# Patient Record
Sex: MALE | Race: WHITE | NOT HISPANIC OR LATINO | Employment: OTHER | URBAN - METROPOLITAN AREA
[De-identification: names, ages, dates, MRNs, and addresses within clinical notes are randomized per-mention and may not be internally consistent; named-entity substitution may affect disease eponyms.]

---

## 2020-11-05 ENCOUNTER — TRANSCRIBE ORDERS (OUTPATIENT)
Dept: ADMINISTRATIVE | Facility: HOSPITAL | Age: 65
End: 2020-11-05

## 2020-11-05 ENCOUNTER — HOSPITAL ENCOUNTER (EMERGENCY)
Facility: HOSPITAL | Age: 65
Discharge: HOME | End: 2020-11-05
Attending: EMERGENCY MEDICINE
Payer: COMMERCIAL

## 2020-11-05 ENCOUNTER — APPOINTMENT (EMERGENCY)
Dept: RADIOLOGY | Facility: HOSPITAL | Age: 65
End: 2020-11-05
Payer: COMMERCIAL

## 2020-11-05 VITALS
BODY MASS INDEX: 34.02 KG/M2 | RESPIRATION RATE: 18 BRPM | HEIGHT: 71 IN | WEIGHT: 243 LBS | HEART RATE: 102 BPM | OXYGEN SATURATION: 98 % | TEMPERATURE: 98 F | SYSTOLIC BLOOD PRESSURE: 147 MMHG | DIASTOLIC BLOOD PRESSURE: 66 MMHG

## 2020-11-05 DIAGNOSIS — R26.89 BALANCE DISORDER: Primary | ICD-10-CM

## 2020-11-05 DIAGNOSIS — S91.302A WOUND OF LEFT FOOT: Primary | ICD-10-CM

## 2020-11-05 DIAGNOSIS — Z86.39 HISTORY OF DIABETES MELLITUS: ICD-10-CM

## 2020-11-05 PROCEDURE — 99283 EMERGENCY DEPT VISIT LOW MDM: CPT

## 2020-11-05 PROCEDURE — 73630 X-RAY EXAM OF FOOT: CPT

## 2020-11-05 PROCEDURE — 99284 EMERGENCY DEPT VISIT MOD MDM: CPT | Performed by: PHYSICIAN ASSISTANT

## 2020-11-05 RX ORDER — CLINDAMYCIN HYDROCHLORIDE 300 MG/1
300 CAPSULE ORAL 3 TIMES DAILY
Qty: 42 CAPSULE | Refills: 0 | Status: SHIPPED | OUTPATIENT
Start: 2020-11-05 | End: 2020-11-19

## 2020-11-05 RX ORDER — CLINDAMYCIN HYDROCHLORIDE 150 MG/1
450 CAPSULE ORAL ONCE
Status: COMPLETED | OUTPATIENT
Start: 2020-11-05 | End: 2020-11-05

## 2020-11-05 RX ADMIN — CLINDAMYCIN HYDROCHLORIDE 450 MG: 150 CAPSULE ORAL at 20:54

## 2020-11-09 ENCOUNTER — OFFICE VISIT (OUTPATIENT)
Dept: PODIATRY | Facility: CLINIC | Age: 65
End: 2020-11-09
Payer: COMMERCIAL

## 2020-11-09 VITALS — HEIGHT: 71 IN | TEMPERATURE: 97.3 F | WEIGHT: 233 LBS | BODY MASS INDEX: 32.62 KG/M2

## 2020-11-09 DIAGNOSIS — M20.22 HALLUX RIGIDUS OF BOTH FEET: ICD-10-CM

## 2020-11-09 DIAGNOSIS — E11.40 TYPE 2 DIABETES MELLITUS WITH DIABETIC NEUROPATHY, WITHOUT LONG-TERM CURRENT USE OF INSULIN (HCC): ICD-10-CM

## 2020-11-09 DIAGNOSIS — M20.21 HALLUX RIGIDUS OF BOTH FEET: ICD-10-CM

## 2020-11-09 DIAGNOSIS — L97.529: Primary | ICD-10-CM

## 2020-11-09 PROCEDURE — 11042 DBRDMT SUBQ TIS 1ST 20SQCM/<: CPT | Performed by: PODIATRIST

## 2020-11-09 PROCEDURE — 99203 OFFICE O/P NEW LOW 30 MIN: CPT | Performed by: PODIATRIST

## 2020-11-09 RX ORDER — GLIMEPIRIDE 4 MG/1
TABLET ORAL 2 TIMES DAILY
COMMUNITY
Start: 2020-10-22 | End: 2022-05-19

## 2020-11-09 RX ORDER — LOSARTAN POTASSIUM 50 MG/1
TABLET ORAL
COMMUNITY
Start: 2020-10-23

## 2020-11-09 RX ORDER — EMPAGLIFLOZIN AND LINAGLIPTIN 25; 5 MG/1; MG/1
TABLET, FILM COATED ORAL
Status: ON HOLD | COMMUNITY
Start: 2020-10-22 | End: 2022-05-17

## 2020-11-09 RX ORDER — DILTIAZEM HYDROCHLORIDE 180 MG/1
CAPSULE, COATED, EXTENDED RELEASE ORAL
COMMUNITY
Start: 2020-10-22

## 2020-11-09 RX ORDER — METHYLPREDNISOLONE 4 MG/1
TABLET ORAL
COMMUNITY
Start: 2020-10-11 | End: 2020-12-27 | Stop reason: HOSPADM

## 2020-11-09 RX ORDER — TRIAMTERENE AND HYDROCHLOROTHIAZIDE 37.5; 25 MG/1; MG/1
CAPSULE ORAL
COMMUNITY
Start: 2020-10-22 | End: 2020-12-27 | Stop reason: HOSPADM

## 2020-11-12 ENCOUNTER — OFFICE VISIT (OUTPATIENT)
Dept: WOUND CARE | Facility: HOSPITAL | Age: 65
End: 2020-11-12
Payer: COMMERCIAL

## 2020-11-12 VITALS
HEIGHT: 71 IN | TEMPERATURE: 97.7 F | RESPIRATION RATE: 18 BRPM | HEART RATE: 72 BPM | DIASTOLIC BLOOD PRESSURE: 78 MMHG | SYSTOLIC BLOOD PRESSURE: 124 MMHG | BODY MASS INDEX: 32.2 KG/M2 | WEIGHT: 230 LBS

## 2020-11-12 DIAGNOSIS — L97.529: Primary | ICD-10-CM

## 2020-11-12 DIAGNOSIS — E11.40 TYPE 2 DIABETES MELLITUS WITH DIABETIC NEUROPATHY, UNSPECIFIED WHETHER LONG TERM INSULIN USE (HCC): ICD-10-CM

## 2020-11-12 PROCEDURE — 99213 OFFICE O/P EST LOW 20 MIN: CPT | Performed by: PODIATRIST

## 2020-11-12 RX ORDER — LIDOCAINE HYDROCHLORIDE 40 MG/ML
5 SOLUTION TOPICAL ONCE
Status: COMPLETED | OUTPATIENT
Start: 2020-11-12 | End: 2020-11-12

## 2020-11-12 RX ADMIN — LIDOCAINE HYDROCHLORIDE 5 ML: 40 SOLUTION TOPICAL at 09:58

## 2020-11-19 ENCOUNTER — OFFICE VISIT (OUTPATIENT)
Dept: WOUND CARE | Facility: HOSPITAL | Age: 65
End: 2020-11-19
Payer: COMMERCIAL

## 2020-11-19 VITALS
RESPIRATION RATE: 16 BRPM | HEART RATE: 64 BPM | TEMPERATURE: 96.3 F | DIASTOLIC BLOOD PRESSURE: 64 MMHG | SYSTOLIC BLOOD PRESSURE: 132 MMHG

## 2020-11-19 DIAGNOSIS — E11.40 TYPE 2 DIABETES MELLITUS WITH DIABETIC NEUROPATHY, UNSPECIFIED WHETHER LONG TERM INSULIN USE (HCC): ICD-10-CM

## 2020-11-19 DIAGNOSIS — L97.529: Primary | ICD-10-CM

## 2020-11-19 PROCEDURE — 11042 DBRDMT SUBQ TIS 1ST 20SQCM/<: CPT | Performed by: PODIATRIST

## 2020-11-19 RX ORDER — LIDOCAINE HYDROCHLORIDE 40 MG/ML
5 SOLUTION TOPICAL ONCE
Status: COMPLETED | OUTPATIENT
Start: 2020-11-19 | End: 2020-11-19

## 2020-11-19 RX ADMIN — LIDOCAINE HYDROCHLORIDE 5 ML: 40 SOLUTION TOPICAL at 09:16

## 2020-11-21 ENCOUNTER — HOSPITAL ENCOUNTER (OUTPATIENT)
Dept: MRI IMAGING | Facility: HOSPITAL | Age: 65
Discharge: HOME | End: 2020-11-21
Payer: COMMERCIAL

## 2020-11-21 DIAGNOSIS — R26.89 BALANCE DISORDER: ICD-10-CM

## 2020-11-21 PROCEDURE — G1004 CDSM NDSC: HCPCS

## 2020-11-21 PROCEDURE — 70551 MRI BRAIN STEM W/O DYE: CPT

## 2020-12-03 ENCOUNTER — OFFICE VISIT (OUTPATIENT)
Dept: WOUND CARE | Facility: HOSPITAL | Age: 65
End: 2020-12-03
Payer: COMMERCIAL

## 2020-12-03 VITALS
TEMPERATURE: 97.9 F | HEART RATE: 96 BPM | SYSTOLIC BLOOD PRESSURE: 126 MMHG | RESPIRATION RATE: 18 BRPM | DIASTOLIC BLOOD PRESSURE: 58 MMHG

## 2020-12-03 DIAGNOSIS — L97.529: Primary | ICD-10-CM

## 2020-12-03 DIAGNOSIS — E11.40 TYPE 2 DIABETES MELLITUS WITH DIABETIC NEUROPATHY, UNSPECIFIED WHETHER LONG TERM INSULIN USE (HCC): ICD-10-CM

## 2020-12-03 PROCEDURE — 99213 OFFICE O/P EST LOW 20 MIN: CPT | Performed by: PODIATRIST

## 2020-12-03 RX ORDER — LIDOCAINE HYDROCHLORIDE 40 MG/ML
5 SOLUTION TOPICAL ONCE
Status: COMPLETED | OUTPATIENT
Start: 2020-12-03 | End: 2020-12-03

## 2020-12-03 RX ADMIN — LIDOCAINE HYDROCHLORIDE 5 ML: 40 SOLUTION TOPICAL at 09:14

## 2020-12-24 ENCOUNTER — OFFICE VISIT (OUTPATIENT)
Dept: WOUND CARE | Facility: HOSPITAL | Age: 65
End: 2020-12-24
Payer: COMMERCIAL

## 2020-12-24 VITALS
RESPIRATION RATE: 18 BRPM | TEMPERATURE: 98.1 F | SYSTOLIC BLOOD PRESSURE: 120 MMHG | HEART RATE: 88 BPM | DIASTOLIC BLOOD PRESSURE: 58 MMHG

## 2020-12-24 DIAGNOSIS — L97.529: Primary | ICD-10-CM

## 2020-12-24 DIAGNOSIS — E11.40 TYPE 2 DIABETES MELLITUS WITH DIABETIC NEUROPATHY, UNSPECIFIED WHETHER LONG TERM INSULIN USE (HCC): ICD-10-CM

## 2020-12-24 PROCEDURE — 99213 OFFICE O/P EST LOW 20 MIN: CPT | Performed by: PODIATRIST

## 2020-12-24 PROCEDURE — 99212 OFFICE O/P EST SF 10 MIN: CPT | Performed by: PODIATRIST

## 2020-12-25 ENCOUNTER — HOSPITAL ENCOUNTER (EMERGENCY)
Facility: HOSPITAL | Age: 65
End: 2020-12-25
Attending: EMERGENCY MEDICINE
Payer: COMMERCIAL

## 2020-12-25 ENCOUNTER — APPOINTMENT (EMERGENCY)
Dept: RADIOLOGY | Facility: HOSPITAL | Age: 65
End: 2020-12-25
Payer: COMMERCIAL

## 2020-12-25 ENCOUNTER — HOSPITAL ENCOUNTER (INPATIENT)
Facility: HOSPITAL | Age: 65
LOS: 2 days | Discharge: HOME | DRG: 177 | End: 2020-12-27
Attending: STUDENT IN AN ORGANIZED HEALTH CARE EDUCATION/TRAINING PROGRAM | Admitting: STUDENT IN AN ORGANIZED HEALTH CARE EDUCATION/TRAINING PROGRAM
Payer: MEDICARE

## 2020-12-25 ENCOUNTER — APPOINTMENT (EMERGENCY)
Dept: CT IMAGING | Facility: HOSPITAL | Age: 65
End: 2020-12-25
Payer: COMMERCIAL

## 2020-12-25 VITALS
OXYGEN SATURATION: 96 % | RESPIRATION RATE: 18 BRPM | SYSTOLIC BLOOD PRESSURE: 115 MMHG | TEMPERATURE: 98.3 F | DIASTOLIC BLOOD PRESSURE: 58 MMHG | HEART RATE: 100 BPM

## 2020-12-25 DIAGNOSIS — R06.00 DYSPNEA: ICD-10-CM

## 2020-12-25 DIAGNOSIS — D50.8 IRON DEFICIENCY ANEMIA SECONDARY TO INADEQUATE DIETARY IRON INTAKE: ICD-10-CM

## 2020-12-25 DIAGNOSIS — N17.9 AKI (ACUTE KIDNEY INJURY) (HCC): ICD-10-CM

## 2020-12-25 DIAGNOSIS — F10.10 ALCOHOL ABUSE: Primary | ICD-10-CM

## 2020-12-25 DIAGNOSIS — U07.1 PNEUMONIA DUE TO COVID-19 VIRUS: Primary | ICD-10-CM

## 2020-12-25 DIAGNOSIS — R79.89 ELEVATED LACTIC ACID LEVEL: ICD-10-CM

## 2020-12-25 DIAGNOSIS — D64.9 ANEMIA: ICD-10-CM

## 2020-12-25 DIAGNOSIS — R16.2 HEPATOSPLENOMEGALY: ICD-10-CM

## 2020-12-25 DIAGNOSIS — U07.1 PNEUMONIA DUE TO COVID-19 VIRUS: ICD-10-CM

## 2020-12-25 DIAGNOSIS — J12.82 PNEUMONIA DUE TO COVID-19 VIRUS: ICD-10-CM

## 2020-12-25 DIAGNOSIS — K20.90 ESOPHAGITIS: ICD-10-CM

## 2020-12-25 DIAGNOSIS — K70.30 CIRRHOSIS WITH ALCOHOLISM (HCC): ICD-10-CM

## 2020-12-25 DIAGNOSIS — R05.9 COUGH: ICD-10-CM

## 2020-12-25 DIAGNOSIS — J12.82 PNEUMONIA DUE TO COVID-19 VIRUS: Primary | ICD-10-CM

## 2020-12-25 DIAGNOSIS — K80.20 GALLSTONE: ICD-10-CM

## 2020-12-25 DIAGNOSIS — N40.0 ENLARGED PROSTATE: ICD-10-CM

## 2020-12-25 PROBLEM — I10 BENIGN ESSENTIAL HTN: Status: ACTIVE | Noted: 2020-12-25

## 2020-12-25 PROBLEM — G47.33 OSA (OBSTRUCTIVE SLEEP APNEA): Status: ACTIVE | Noted: 2020-12-25

## 2020-12-25 PROBLEM — D69.6 THROMBOCYTOPENIA (HCC): Status: ACTIVE | Noted: 2020-12-25

## 2020-12-25 PROBLEM — E11.9 TYPE 2 DIABETES MELLITUS (HCC): Status: ACTIVE | Noted: 2020-12-25

## 2020-12-25 LAB
ABO GROUP BLD: NORMAL
ABO GROUP BLD: NORMAL
ALBUMIN SERPL BCP-MCNC: 2.8 G/DL (ref 3.5–5)
ALP SERPL-CCNC: 203 U/L (ref 46–116)
ALT SERPL W P-5'-P-CCNC: 63 U/L (ref 12–78)
ANION GAP SERPL CALCULATED.3IONS-SCNC: 15 MMOL/L (ref 4–13)
APTT PPP: 36 SECONDS (ref 23–37)
AST SERPL W P-5'-P-CCNC: 94 U/L (ref 5–45)
ATRIAL RATE: 100 BPM
BASE EX.OXY STD BLDV CALC-SCNC: 87.5 % (ref 60–80)
BASE EXCESS BLDV CALC-SCNC: -7.1 MMOL/L
BASOPHILS # BLD AUTO: 0.05 THOUSANDS/ΜL (ref 0–0.1)
BASOPHILS NFR BLD AUTO: 1 % (ref 0–1)
BETA-HYDROXYBUTYRATE: 0.1 MMOL/L
BILIRUB SERPL-MCNC: 1.29 MG/DL (ref 0.2–1)
BLD GP AB SCN SERPL QL: NEGATIVE
BUN SERPL-MCNC: 25 MG/DL (ref 5–25)
CALCIUM ALBUM COR SERPL-MCNC: 9.7 MG/DL (ref 8.3–10.1)
CALCIUM SERPL-MCNC: 8.7 MG/DL (ref 8.3–10.1)
CHLORIDE SERPL-SCNC: 98 MMOL/L (ref 100–108)
CO2 SERPL-SCNC: 19 MMOL/L (ref 21–32)
CREAT SERPL-MCNC: 1.45 MG/DL (ref 0.6–1.3)
D DIMER PPP FEU-MCNC: 0.85 UG/ML FEU
EOSINOPHIL # BLD AUTO: 0.08 THOUSAND/ΜL (ref 0–0.61)
EOSINOPHIL NFR BLD AUTO: 2 % (ref 0–6)
ERYTHROCYTE [DISTWIDTH] IN BLOOD BY AUTOMATED COUNT: 19.9 % (ref 11.6–15.1)
ETHANOL SERPL-MCNC: 158 MG/DL (ref 0–3)
EXT FECAL OCCULT BLOOD SCREEN: NEGATIVE
EXT. CONTROL: NORMAL
FERRITIN SERPL-MCNC: 15 NG/ML (ref 8–388)
FLUAV RNA RESP QL NAA+PROBE: NEGATIVE
FLUBV RNA RESP QL NAA+PROBE: NEGATIVE
GFR SERPL CREATININE-BSD FRML MDRD: 49 ML/MIN/1.73SQ M
GLUCOSE SERPL-MCNC: 124 MG/DL (ref 65–140)
GLUCOSE SERPL-MCNC: 152 MG/DL (ref 65–140)
GLUCOSE SERPL-MCNC: 156 MG/DL (ref 65–140)
GLUCOSE SERPL-MCNC: 160 MG/DL (ref 65–140)
GLUCOSE SERPL-MCNC: 193 MG/DL (ref 65–140)
HCO3 BLDV-SCNC: 16 MMOL/L (ref 24–30)
HCT VFR BLD AUTO: 28.2 % (ref 36.5–49.3)
HGB BLD-MCNC: 7.9 G/DL (ref 12–17)
IMM GRANULOCYTES # BLD AUTO: 0.03 THOUSAND/UL (ref 0–0.2)
IMM GRANULOCYTES NFR BLD AUTO: 1 % (ref 0–2)
INR PPP: 1.25 (ref 0.84–1.19)
IRON SATN MFR SERPL: 6 %
IRON SERPL-MCNC: 22 UG/DL (ref 65–175)
LACTATE SERPL-SCNC: 3 MMOL/L (ref 0.5–2)
LACTATE SERPL-SCNC: 3.5 MMOL/L (ref 0.5–2)
LYMPHOCYTES # BLD AUTO: 0.57 THOUSANDS/ΜL (ref 0.6–4.47)
LYMPHOCYTES NFR BLD AUTO: 12 % (ref 14–44)
MCH RBC QN AUTO: 19.9 PG (ref 26.8–34.3)
MCHC RBC AUTO-ENTMCNC: 28 G/DL (ref 31.4–37.4)
MCV RBC AUTO: 71 FL (ref 82–98)
MONOCYTES # BLD AUTO: 0.68 THOUSAND/ΜL (ref 0.17–1.22)
MONOCYTES NFR BLD AUTO: 15 % (ref 4–12)
NEUTROPHILS # BLD AUTO: 3.23 THOUSANDS/ΜL (ref 1.85–7.62)
NEUTS SEG NFR BLD AUTO: 69 % (ref 43–75)
NRBC BLD AUTO-RTO: 1 /100 WBCS
NT-PROBNP SERPL-MCNC: 79 PG/ML
O2 CT BLDV-SCNC: 10.9 ML/DL
P AXIS: 48 DEGREES
PCO2 BLDV: 24.3 MM HG (ref 42–50)
PH BLDV: 7.44 [PH] (ref 7.3–7.4)
PLATELET # BLD AUTO: 109 THOUSANDS/UL (ref 149–390)
PO2 BLDV: 62.8 MM HG (ref 35–45)
POTASSIUM SERPL-SCNC: 3.6 MMOL/L (ref 3.5–5.3)
PR INTERVAL: 162 MS
PROT SERPL-MCNC: 6.9 G/DL (ref 6.4–8.2)
PROTHROMBIN TIME: 15.8 SECONDS (ref 11.6–14.5)
QRS AXIS: 17 DEGREES
QRSD INTERVAL: 84 MS
QT INTERVAL: 312 MS
QTC INTERVAL: 402 MS
RBC # BLD AUTO: 3.97 MILLION/UL (ref 3.88–5.62)
RH BLD: POSITIVE
RH BLD: POSITIVE
RSV RNA RESP QL NAA+PROBE: NEGATIVE
SARS-COV-2 RNA RESP QL NAA+PROBE: POSITIVE
SODIUM SERPL-SCNC: 132 MMOL/L (ref 136–145)
SPECIMEN EXPIRATION DATE: NORMAL
T WAVE AXIS: 59 DEGREES
TIBC SERPL-MCNC: 391 UG/DL (ref 250–450)
TROPONIN I SERPL-MCNC: 0.02 NG/ML
VENTRICULAR RATE: 100 BPM
WBC # BLD AUTO: 4.64 THOUSAND/UL (ref 4.31–10.16)

## 2020-12-25 PROCEDURE — 99285 EMERGENCY DEPT VISIT HI MDM: CPT

## 2020-12-25 PROCEDURE — 85379 FIBRIN DEGRADATION QUANT: CPT | Performed by: EMERGENCY MEDICINE

## 2020-12-25 PROCEDURE — 36415 COLL VENOUS BLD VENIPUNCTURE: CPT | Performed by: EMERGENCY MEDICINE

## 2020-12-25 PROCEDURE — 82948 REAGENT STRIP/BLOOD GLUCOSE: CPT

## 2020-12-25 PROCEDURE — C9113 INJ PANTOPRAZOLE SODIUM, VIA: HCPCS | Performed by: EMERGENCY MEDICINE

## 2020-12-25 PROCEDURE — 94660 CPAP INITIATION&MGMT: CPT

## 2020-12-25 PROCEDURE — 80320 DRUG SCREEN QUANTALCOHOLS: CPT | Performed by: EMERGENCY MEDICINE

## 2020-12-25 PROCEDURE — 0241U HB NFCT DS VIR RESP RNA 4 TRGT: CPT | Performed by: EMERGENCY MEDICINE

## 2020-12-25 PROCEDURE — 82010 KETONE BODYS QUAN: CPT | Performed by: EMERGENCY MEDICINE

## 2020-12-25 PROCEDURE — 85730 THROMBOPLASTIN TIME PARTIAL: CPT | Performed by: EMERGENCY MEDICINE

## 2020-12-25 PROCEDURE — 96375 TX/PRO/DX INJ NEW DRUG ADDON: CPT

## 2020-12-25 PROCEDURE — 83550 IRON BINDING TEST: CPT | Performed by: EMERGENCY MEDICINE

## 2020-12-25 PROCEDURE — 96365 THER/PROPH/DIAG IV INF INIT: CPT

## 2020-12-25 PROCEDURE — 84484 ASSAY OF TROPONIN QUANT: CPT | Performed by: EMERGENCY MEDICINE

## 2020-12-25 PROCEDURE — 85610 PROTHROMBIN TIME: CPT | Performed by: EMERGENCY MEDICINE

## 2020-12-25 PROCEDURE — 96361 HYDRATE IV INFUSION ADD-ON: CPT

## 2020-12-25 PROCEDURE — 99223 1ST HOSP IP/OBS HIGH 75: CPT | Performed by: STUDENT IN AN ORGANIZED HEALTH CARE EDUCATION/TRAINING PROGRAM

## 2020-12-25 PROCEDURE — 71045 X-RAY EXAM CHEST 1 VIEW: CPT

## 2020-12-25 PROCEDURE — 94760 N-INVAS EAR/PLS OXIMETRY 1: CPT

## 2020-12-25 PROCEDURE — 80053 COMPREHEN METABOLIC PANEL: CPT | Performed by: EMERGENCY MEDICINE

## 2020-12-25 PROCEDURE — 82728 ASSAY OF FERRITIN: CPT | Performed by: EMERGENCY MEDICINE

## 2020-12-25 PROCEDURE — 74177 CT ABD & PELVIS W/CONTRAST: CPT

## 2020-12-25 PROCEDURE — 85025 COMPLETE CBC W/AUTO DIFF WBC: CPT | Performed by: EMERGENCY MEDICINE

## 2020-12-25 PROCEDURE — 83605 ASSAY OF LACTIC ACID: CPT | Performed by: EMERGENCY MEDICINE

## 2020-12-25 PROCEDURE — 87040 BLOOD CULTURE FOR BACTERIA: CPT | Performed by: EMERGENCY MEDICINE

## 2020-12-25 PROCEDURE — 83540 ASSAY OF IRON: CPT | Performed by: EMERGENCY MEDICINE

## 2020-12-25 PROCEDURE — 71275 CT ANGIOGRAPHY CHEST: CPT

## 2020-12-25 PROCEDURE — 99285 EMERGENCY DEPT VISIT HI MDM: CPT | Performed by: EMERGENCY MEDICINE

## 2020-12-25 PROCEDURE — 86900 BLOOD TYPING SEROLOGIC ABO: CPT | Performed by: EMERGENCY MEDICINE

## 2020-12-25 PROCEDURE — 93005 ELECTROCARDIOGRAM TRACING: CPT

## 2020-12-25 PROCEDURE — 83880 ASSAY OF NATRIURETIC PEPTIDE: CPT | Performed by: EMERGENCY MEDICINE

## 2020-12-25 PROCEDURE — 82805 BLOOD GASES W/O2 SATURATION: CPT | Performed by: EMERGENCY MEDICINE

## 2020-12-25 PROCEDURE — 93010 ELECTROCARDIOGRAM REPORT: CPT | Performed by: INTERNAL MEDICINE

## 2020-12-25 PROCEDURE — 86901 BLOOD TYPING SEROLOGIC RH(D): CPT | Performed by: EMERGENCY MEDICINE

## 2020-12-25 PROCEDURE — 86850 RBC ANTIBODY SCREEN: CPT | Performed by: EMERGENCY MEDICINE

## 2020-12-25 RX ORDER — MELATONIN
2000 DAILY
Status: DISCONTINUED | OUTPATIENT
Start: 2020-12-25 | End: 2020-12-27 | Stop reason: HOSPADM

## 2020-12-25 RX ORDER — DILTIAZEM HYDROCHLORIDE 180 MG/1
180 CAPSULE, COATED, EXTENDED RELEASE ORAL DAILY
Status: DISCONTINUED | OUTPATIENT
Start: 2020-12-25 | End: 2020-12-27 | Stop reason: HOSPADM

## 2020-12-25 RX ORDER — PANTOPRAZOLE SODIUM 40 MG/1
40 INJECTION, POWDER, FOR SOLUTION INTRAVENOUS ONCE
Status: COMPLETED | OUTPATIENT
Start: 2020-12-25 | End: 2020-12-25

## 2020-12-25 RX ORDER — HEPARIN SODIUM 5000 [USP'U]/ML
5000 INJECTION, SOLUTION INTRAVENOUS; SUBCUTANEOUS EVERY 8 HOURS SCHEDULED
Status: DISCONTINUED | OUTPATIENT
Start: 2020-12-25 | End: 2020-12-27 | Stop reason: HOSPADM

## 2020-12-25 RX ORDER — BENZONATATE 100 MG/1
200 CAPSULE ORAL ONCE
Status: COMPLETED | OUTPATIENT
Start: 2020-12-25 | End: 2020-12-25

## 2020-12-25 RX ORDER — MULTIVITAMIN/IRON/FOLIC ACID 18MG-0.4MG
1 TABLET ORAL DAILY
Status: DISCONTINUED | OUTPATIENT
Start: 2021-01-01 | End: 2020-12-27 | Stop reason: HOSPADM

## 2020-12-25 RX ORDER — SODIUM CHLORIDE 9 MG/ML
125 INJECTION, SOLUTION INTRAVENOUS CONTINUOUS
Status: DISCONTINUED | OUTPATIENT
Start: 2020-12-25 | End: 2020-12-25 | Stop reason: HOSPADM

## 2020-12-25 RX ORDER — DOXYCYCLINE HYCLATE 100 MG/1
100 CAPSULE ORAL ONCE
Status: COMPLETED | OUTPATIENT
Start: 2020-12-25 | End: 2020-12-25

## 2020-12-25 RX ORDER — THIAMINE MONONITRATE (VIT B1) 100 MG
100 TABLET ORAL DAILY
Status: DISCONTINUED | OUTPATIENT
Start: 2020-12-25 | End: 2020-12-27 | Stop reason: HOSPADM

## 2020-12-25 RX ORDER — PROMETHAZINE HYDROCHLORIDE 6.25 MG/5ML
SYRUP ORAL 4 TIMES DAILY PRN
COMMUNITY
End: 2022-05-19

## 2020-12-25 RX ORDER — BUDESONIDE AND FORMOTEROL FUMARATE DIHYDRATE 80; 4.5 UG/1; UG/1
2 AEROSOL RESPIRATORY (INHALATION) 2 TIMES DAILY
Status: DISCONTINUED | OUTPATIENT
Start: 2020-12-25 | End: 2020-12-27 | Stop reason: HOSPADM

## 2020-12-25 RX ORDER — FOLIC ACID 1 MG/1
1 TABLET ORAL DAILY
Status: DISCONTINUED | OUTPATIENT
Start: 2020-12-25 | End: 2020-12-27 | Stop reason: HOSPADM

## 2020-12-25 RX ORDER — ZINC SULFATE 50(220)MG
220 CAPSULE ORAL DAILY
Status: DISCONTINUED | OUTPATIENT
Start: 2020-12-25 | End: 2020-12-27 | Stop reason: HOSPADM

## 2020-12-25 RX ORDER — ACETAMINOPHEN 325 MG/1
650 TABLET ORAL EVERY 8 HOURS PRN
Status: DISCONTINUED | OUTPATIENT
Start: 2020-12-25 | End: 2020-12-27 | Stop reason: HOSPADM

## 2020-12-25 RX ORDER — LABETALOL 20 MG/4 ML (5 MG/ML) INTRAVENOUS SYRINGE
10 EVERY 6 HOURS PRN
Status: DISCONTINUED | OUTPATIENT
Start: 2020-12-25 | End: 2020-12-27 | Stop reason: HOSPADM

## 2020-12-25 RX ORDER — ASCORBIC ACID 500 MG
1000 TABLET ORAL EVERY 12 HOURS SCHEDULED
Status: DISCONTINUED | OUTPATIENT
Start: 2020-12-25 | End: 2020-12-27 | Stop reason: HOSPADM

## 2020-12-25 RX ORDER — SODIUM CHLORIDE 9 MG/ML
100 INJECTION, SOLUTION INTRAVENOUS CONTINUOUS
Status: DISCONTINUED | OUTPATIENT
Start: 2020-12-25 | End: 2020-12-26

## 2020-12-25 RX ORDER — FAMOTIDINE 20 MG/1
20 TABLET, FILM COATED ORAL DAILY
Status: DISCONTINUED | OUTPATIENT
Start: 2020-12-25 | End: 2020-12-27 | Stop reason: HOSPADM

## 2020-12-25 RX ORDER — RAMIPRIL 5 MG/1
5 CAPSULE ORAL DAILY
COMMUNITY
End: 2020-12-27 | Stop reason: HOSPADM

## 2020-12-25 RX ADMIN — THIAMINE HCL TAB 100 MG 100 MG: 100 TAB at 13:39

## 2020-12-25 RX ADMIN — INSULIN LISPRO 1 UNITS: 100 INJECTION, SOLUTION INTRAVENOUS; SUBCUTANEOUS at 16:38

## 2020-12-25 RX ADMIN — IOHEXOL 100 ML: 350 INJECTION, SOLUTION INTRAVENOUS at 05:40

## 2020-12-25 RX ADMIN — ACETAMINOPHEN 650 MG: 325 TABLET, FILM COATED ORAL at 17:58

## 2020-12-25 RX ADMIN — FOLIC ACID: 5 INJECTION, SOLUTION INTRAMUSCULAR; INTRAVENOUS; SUBCUTANEOUS at 04:59

## 2020-12-25 RX ADMIN — CEFTRIAXONE SODIUM 1000 MG: 10 INJECTION, POWDER, FOR SOLUTION INTRAVENOUS at 07:26

## 2020-12-25 RX ADMIN — OXYCODONE HYDROCHLORIDE AND ACETAMINOPHEN 1000 MG: 500 TABLET ORAL at 13:40

## 2020-12-25 RX ADMIN — SODIUM CHLORIDE 125 ML/HR: 0.9 INJECTION, SOLUTION INTRAVENOUS at 06:47

## 2020-12-25 RX ADMIN — FAMOTIDINE 20 MG: 10 INJECTION INTRAVENOUS at 07:25

## 2020-12-25 RX ADMIN — PANTOPRAZOLE SODIUM 40 MG: 40 INJECTION, POWDER, FOR SOLUTION INTRAVENOUS at 07:25

## 2020-12-25 RX ADMIN — OXYCODONE HYDROCHLORIDE AND ACETAMINOPHEN 1000 MG: 500 TABLET ORAL at 21:49

## 2020-12-25 RX ADMIN — BENZONATATE 200 MG: 100 CAPSULE ORAL at 02:28

## 2020-12-25 RX ADMIN — FOLIC ACID 1 MG: 1 TABLET ORAL at 13:44

## 2020-12-25 RX ADMIN — SODIUM CHLORIDE 100 ML/HR: 0.9 INJECTION, SOLUTION INTRAVENOUS at 16:44

## 2020-12-25 RX ADMIN — SODIUM CHLORIDE 1000 ML: 0.9 INJECTION, SOLUTION INTRAVENOUS at 06:47

## 2020-12-25 RX ADMIN — SODIUM CHLORIDE 1000 ML: 0.9 INJECTION, SOLUTION INTRAVENOUS at 03:10

## 2020-12-25 RX ADMIN — HEPARIN SODIUM 5000 UNITS: 5000 INJECTION INTRAVENOUS; SUBCUTANEOUS at 13:40

## 2020-12-25 RX ADMIN — DILTIAZEM HYDROCHLORIDE 180 MG: 180 CAPSULE, COATED, EXTENDED RELEASE ORAL at 13:40

## 2020-12-25 RX ADMIN — Medication 2000 UNITS: at 13:41

## 2020-12-25 RX ADMIN — FAMOTIDINE 20 MG: 20 TABLET ORAL at 13:39

## 2020-12-25 RX ADMIN — Medication 1 TABLET: at 13:39

## 2020-12-25 RX ADMIN — ZINC SULFATE 220 MG (50 MG) CAPSULE 220 MG: CAPSULE at 13:40

## 2020-12-25 RX ADMIN — BUDESONIDE AND FORMOTEROL FUMARATE DIHYDRATE 2 PUFF: 80; 4.5 AEROSOL RESPIRATORY (INHALATION) at 16:38

## 2020-12-25 RX ADMIN — DOXYCYCLINE 100 MG: 100 CAPSULE ORAL at 07:25

## 2020-12-25 RX ADMIN — HEPARIN SODIUM 5000 UNITS: 5000 INJECTION INTRAVENOUS; SUBCUTANEOUS at 21:49

## 2020-12-25 RX ADMIN — SODIUM CHLORIDE 100 ML/HR: 0.9 INJECTION, SOLUTION INTRAVENOUS at 21:00

## 2020-12-25 NOTE — H&P
H&P- Caorl Vee 1955, 77 y o  male MRN: [id number]    Unit/Bed#: -Mary Encounter: [id number]    Primary Care Provider: Sania Raphael MD   Date and time admitted to hospital: 12/25/2020 10:47 AM        * Pneumonia due to COVID-19 virus  Assessment & Plan  66-year-old male with past medical history of hypertension, type 2 diabetes and sleep apnea presented from and sent as a transfer and diagnosed with COVID  Diagnosed with COVID on 12/25  Currently on room air  Initiated COVID mild pathway  Continue supportive therapy  Will consider steroids, remdesivir and plasma and patient would need oxygen  Trend inflammatory markers    Thrombocytopenia ([facility] [address] )  Assessment & Plan  As above with anemia    Anemia  Assessment & Plan  Unknown baseline of patient hemoglobin  Likely consistent with thrombocytopenia and long-term cirrhosis found on imaging  Continue to monitor, will check iron panel    MEG (obstructive sleep apnea)  Assessment & Plan  Continue CPAP at night    Alcoholic cirrhosis ([facility] [address] )  Assessment & Plan  CT abdomen shows nodular appearance of liver consistent with cirrhosis  Long-time history of alcohol abuse  Recommend follow outpatient with GI doctor consider MRI for further imaging    Cholelithiasis  Assessment & Plan  CT abdomen pelvis completed, showed cholelithiasis with no obstruction  Currently asymptomatic with no abdominal pain    ABILIO (acute kidney injury) ([facility] [address] )  Assessment & Plan  Unknown baseline creatinine  Will trial IV fluids  Recheck BMP tomorrow  Avoid nephrotoxic agents  Hold are been Lasix at this time  Consider nephrology evaluation patient renal functions not improved tomorrow    Benign essential HTN  Assessment & Plan  Blood pressure currently well control  Continue diltiazem and given tachycardia  Hold losartan as patient may have ABILIO  IV labetalol p r n       Type 2 diabetes mellitus [facility])  Assessment & Plan  No results found for: HGBA1C    Recent Labs     12/25/20  0222 12/25/20  1355 POCGLU 152* 124     History of type 2 diabetes on oral medications, no history of insulin  Sliding scale, Accu-Cheks      Blood Sugar Average: Last 72 hrs:  (P) 124    Alcohol abuse  Assessment & Plan  History of significant alcohol abuse for over 10 years  Reports drinking 5-6 drinks a day  No reported history of alcohol withdrawal  CIWA protocol      VTE Prophylaxis: Heparin  / sequential compression device   Code Status:  Full code  POLST: There is no POLST form on file for this patient (pre-hospital)  Discussion with family:  Patient    Anticipated Length of Stay:  Patient will be admitted on an Inpatient basis with an anticipated length of stay of  2 midnights  Justification for Hospital Stay: , pneumonia COVID    Total Time for Visit, including Counseling / Coordination of Care: 45 minutes  Greater than 50% of this total time spent on direct patient counseling and coordination of care  Chief Complaint:   Coughing, shortness of breath    History of Present Illness:    Jair Decker is a 77 y o  male who presents with persistent cough over several weeks  Past medical history of diabetes, hypertension and sleep apnea  Patient initially presented to [address] [place] [address] with complaints of cough  He was previously seeing his PCP which change his ramipril to losartan with no improvement over several weeks  He reports previously traveling to [place] [facility] during the pandemic and has a significant history of drinking 4-6 drinks daily  He was diagnosed with COVID and was sent here for monitoring  He denies any fevers, chills, diarrhea or dysuria  CT imaging showed nodular appearance of the liver consistent with cirrhosis  It also showed cholelithiasis nonobstructive and patient did denies any symptoms  Review of Systems:    Review of Systems   Constitutional: Negative for activity change, appetite change, chills and fever  HENT: Negative for congestion, sinus pressure and sore throat      Eyes: Negative for pain and discharge  Respiratory: Positive for cough and shortness of breath  Negative for wheezing  Cardiovascular: Negative for chest pain, palpitations and leg swelling  Gastrointestinal: Negative for abdominal distention, abdominal pain, blood in stool, diarrhea, nausea and vomiting  Endocrine: Negative for cold intolerance, heat intolerance, polydipsia, polyphagia and polyuria  Genitourinary: Negative for dysuria, frequency, hematuria and urgency  Musculoskeletal: Negative for arthralgias and back pain  Skin: Negative for color change and pallor  Neurological: Negative for seizures, syncope and weakness  Psychiatric/Behavioral: Negative for agitation and confusion  Past Medical and Surgical History:     Past Medical History:   Diagnosis Date    Arrhythmia     Diabetes mellitus ([facility] [address] )     History of echocardiogram 11/14/2017    showed EF of 50-55 percentWith moderate LVH and left ventricle diastolic dysfunction  Left atrium was moderately enlarged  Trace MR noted   History of Holter monitoring 11/21/2017    showed baseline rhythm of sinus origin with an average heart it of 61 bpm  The lowest heart rate was 49 and the highest heart rate was 10 8 bpm  There were rare single VPCs, and frequent PACs representing 3 2% of total beats  There were several episodes of sinus arrhythmias with sinus bradycardia and heart rate ranging from 40-90 bpm  No sustained dysrhythmias, or pauses noted  The patient did not    Hypertension     Obese        Past Surgical History:   Procedure Laterality Date    EYE SURGERY Left 1997    HERNIA REPAIR  [id number]    KNEE ARTHROPLASTY Right 2008    SHOULDER SURGERY Right 2002       Meds/Allergies:    Prior to Admission medications    Medication Sig Start Date End Date Taking?  Authorizing Provider   diltiazem (CARDIZEM CD) 180 mg 24 hr capsule  10/22/20  Yes Historical Provider, MD   Glyxambi 25-5 MG TABS  10/22/20  Yes Historical Provider, MD losartan (COZAAR) 50 mg tablet  10/23/20  Yes Historical Provider, MD   fluticasone-salmeterol (ADVAIR HFA) 115-21 MCG/ACT inhaler Inhale 2 puffs 2 (two) times a day Rinse mouth after use  Historical Provider, MD   glimepiride (AMARYL) 4 mg tablet 2 (two) times a day  10/22/20   Historical Provider, MD   methylPREDNISolone 4 MG tablet therapy pack  10/11/20   Historical Provider, MD   promethazine (PHENERGAN) 12 5 mg/10 mL syrup Take by mouth 4 (four) times a day as needed for nausea or vomiting    Historical Provider, MD   ramipril (ALTACE) 5 mg capsule Take 5 mg by mouth daily    Historical Provider, MD   triamterene-hydrochlorothiazide (DYAZIDE) 37 5-25 mg per capsule  10/22/20   Historical Provider, MD     I have reviewed home medications with patient personally  Allergies:    Allergies   Allergen Reactions    Sulfa Antibiotics        Social History:     Marital Status: /Civil Union   Occupation:  Retired  Patient Pre-hospital Living Situation:    Patient Pre-hospital Level of Mobility:  Ambulatory  Patient Pre-hospital Diet Restrictions:  Diabetic  Substance Use History:   Social History     Substance and Sexual Activity   Alcohol Use Yes    Frequency: 4 or more times a week    Drinks per session: 3 or 4    Comment: drinks rum     Social History     Tobacco Use   Smoking Status Former Smoker    Packs/day: 0 50    Years: 20 00    Pack years: 10 00    Types: Cigarettes   Smokeless Tobacco Never Used     Social History     Substance and Sexual Activity   Drug Use Never       Family History:    Family History   Problem Relation Age of Onset    Diabetes Mother     Supraventricular tachycardia Mother     COPD Father     Heart disease Father     Other Father         Sepsis; related to UTI with complicating cardiac problems    Heart disease Paternal Grandmother        Physical Exam:     Vitals:   Blood Pressure: 124/69 (12/25/20 1343)  Pulse: (!) 106 (12/25/20 1343)  Respirations: 18 (12/25/20 1343)  Height: 5' 10" (177 8 cm) (12/25/20 1343)  SpO2: 96 % (12/25/20 1343)    Physical Exam  Vitals signs and nursing note reviewed  Constitutional:       Appearance: Normal appearance  He is obese  HENT:      Head: Normocephalic and atraumatic  Eyes:      Conjunctiva/sclera: Conjunctivae normal       Pupils: Pupils are equal, round, and reactive to light  Cardiovascular:      Rate and Rhythm: Normal rate and regular rhythm  Heart sounds: Normal heart sounds  Pulmonary:      Breath sounds: Normal breath sounds  No wheezing or rhonchi  Abdominal:      General: Bowel sounds are normal  There is no distension  Palpations: Abdomen is soft  Tenderness: There is no abdominal tenderness  Comments: Protruding abdomen   Musculoskeletal: Normal range of motion  General: No swelling  Skin:     General: Skin is warm and dry  Neurological:      General: No focal deficit present  Mental Status: He is alert and oriented to person, place, and time  Mental status is at baseline  Additional Data:     Lab Results: I have personally reviewed pertinent reports        Results from last 7 days   Lab Units 12/25/20 0227   WBC Thousand/uL 4 64   HEMOGLOBIN g/dL 7 9*   HEMATOCRIT % 28 2*   PLATELETS Thousands/uL 109*   NEUTROS PCT % 69   LYMPHS PCT % 12*   MONOS PCT % 15*   EOS PCT % 2     Results from last 7 days   Lab Units 12/25/20 0227   SODIUM mmol/L 132*   POTASSIUM mmol/L 3 6   CHLORIDE mmol/L 98*   CO2 mmol/L 19*   BUN mg/dL 25   CREATININE mg/dL 1 45*   ANION GAP mmol/L 15*   CALCIUM mg/dL 8 7   ALBUMIN g/dL 2 8*   TOTAL BILIRUBIN mg/dL 1 29*   ALK PHOS U/L 203*   ALT U/L 63   AST U/L 94*   GLUCOSE RANDOM mg/dL 156*     Results from last 7 days   Lab Units 12/25/20  0227   INR  1 25*     Results from last 7 days   Lab Units 12/25/20  1355 12/25/20  0222   POC GLUCOSE mg/dl 124 152*         Results from last 7 days   Lab Units 12/25/20  0513 12/25/20  6981 LACTIC ACID mmol/L 3 0* 3 5*       Imaging: I have personally reviewed pertinent reports  No orders to display       EKG, Pathology, and Other Studies Reviewed on Admission:   · EKG:  Sinus rhythm, heart rate of 100    Allscripts / Epic Records Reviewed: Yes     ** Please Note: This note has been constructed using a voice recognition system   **

## 2020-12-25 NOTE — ASSESSMENT & PLAN NOTE
47-year-old male with past medical history of hypertension, type 2 diabetes and sleep apnea presented from and sent as a transfer and diagnosed with COVID    Diagnosed with COVID on 12/25  Currently on room air  Initiated COVID mild pathway  Continue supportive therapy  Will consider steroids, remdesivir and plasma and patient would need oxygen  Trend inflammatory markers

## 2020-12-25 NOTE — ASSESSMENT & PLAN NOTE
Blood pressure currently well control  Continue diltiazem and given tachycardia  Hold losartan as patient may have ABILIO  IV labetalol p r n

## 2020-12-25 NOTE — ASSESSMENT & PLAN NOTE
No results found for: HGBA1C    Recent Labs     12/25/20  0222 12/25/20  1355   POCGLU 152* 124     History of type 2 diabetes on oral medications, no history of insulin  Sliding scale, Accu-Cheks      Blood Sugar Average: Last 72 hrs:  (P) 124

## 2020-12-25 NOTE — ASSESSMENT & PLAN NOTE
CT abdomen pelvis completed, showed cholelithiasis with no obstruction  Currently asymptomatic with no abdominal pain

## 2020-12-25 NOTE — ASSESSMENT & PLAN NOTE
Unknown baseline creatinine  Will trial IV fluids  Recheck BMP tomorrow  Avoid nephrotoxic agents  Hold are been Lasix at this time  Consider nephrology evaluation patient renal functions not improved tomorrow

## 2020-12-25 NOTE — ASSESSMENT & PLAN NOTE
Unknown baseline of patient hemoglobin  Likely consistent with thrombocytopenia and long-term cirrhosis found on imaging  Continue to monitor, will check iron panel

## 2020-12-25 NOTE — ASSESSMENT & PLAN NOTE
CT abdomen shows nodular appearance of liver consistent with cirrhosis  Long-time history of alcohol abuse  Recommend follow outpatient with GI doctor consider MRI for further imaging

## 2020-12-25 NOTE — PLAN OF CARE
Problem: Potential for Falls  Goal: Patient will remain free of falls  Description: INTERVENTIONS:  - Assess patient frequently for physical needs  -  Identify cognitive and physical deficits and behaviors that affect risk of falls  -  Addison fall precautions as indicated by assessment   - Educate patient/family on patient safety including physical limitations  - Instruct patient to call for assistance with activity based on assessment  - Modify environment to reduce risk of injury  - Consider OT/PT consult to assist with strengthening/mobility  Outcome: Progressing     Problem: INFECTION - ADULT  Goal: Absence or prevention of progression during hospitalization  Description: INTERVENTIONS:  - Assess and monitor for signs and symptoms of infection  - Monitor lab/diagnostic results  - Monitor all insertion sites, i e  indwelling lines, tubes, and drains  - Monitor endotracheal if appropriate and nasal secretions for changes in amount and color  - Addison appropriate cooling/warming therapies per order  - Administer medications as ordered  - Instruct and encourage patient and family to use good hand hygiene technique  - Identify and instruct in appropriate isolation precautions for identified infection/condition  Outcome: Progressing  Goal: Absence of fever/infection during neutropenic period  Description: INTERVENTIONS:  - Monitor WBC    Outcome: Progressing     Problem: SAFETY ADULT  Goal: Patient will remain free of falls  Description: INTERVENTIONS:  - Assess patient frequently for physical needs  -  Identify cognitive and physical deficits and behaviors that affect risk of falls    -  Addison fall precautions as indicated by assessment   - Educate patient/family on patient safety including physical limitations  - Instruct patient to call for assistance with activity based on assessment  - Modify environment to reduce risk of injury  - Consider OT/PT consult to assist with strengthening/mobility  Outcome: Progressing  Goal: Maintain or return to baseline ADL function  Description: INTERVENTIONS:  -  Assess patient's ability to carry out ADLs; assess patient's baseline for ADL function and identify physical deficits which impact ability to perform ADLs (bathing, care of mouth/teeth, toileting, grooming, dressing, etc )  - Assess/evaluate cause of self-care deficits   - Assess range of motion  - Assess patient's mobility; develop plan if impaired  - Assess patient's need for assistive devices and provide as appropriate  - Encourage maximum independence but intervene and supervise when necessary  - Involve family in performance of ADLs  - Assess for home care needs following discharge   - Consider OT consult to assist with ADL evaluation and planning for discharge  - Provide patient education as appropriate  Outcome: Progressing  Goal: Maintain or return mobility status to optimal level  Description: INTERVENTIONS:  - Assess patient's baseline mobility status (ambulation, transfers, stairs, etc )    - Identify cognitive and physical deficits and behaviors that affect mobility  - Identify mobility aids required to assist with transfers and/or ambulation (gait belt, sit-to-stand, lift, walker, cane, etc )  - Independence fall precautions as indicated by assessment  - Record patient progress and toleration of activity level on Mobility SBAR; progress patient to next Phase/Stage  - Instruct patient to call for assistance with activity based on assessment  - Consider rehabilitation consult to assist with strengthening/weightbearing, etc   Outcome: Progressing     Problem: Knowledge Deficit  Goal: Patient/family/caregiver demonstrates understanding of disease process, treatment plan, medications, and discharge instructions  Description: Complete learning assessment and assess knowledge base    Interventions:  - Provide teaching at level of understanding  - Provide teaching via preferred learning methods  Outcome: Progressing Problem: DISCHARGE PLANNING  Goal: Discharge to home or other facility with appropriate resources  Description: INTERVENTIONS:  - Identify barriers to discharge w/patient and caregiver  - Arrange for needed discharge resources and transportation as appropriate  - Identify discharge learning needs (meds, wound care, etc )  - Arrange for interpretive services to assist at discharge as needed  - Refer to Case Management Department for coordinating discharge planning if the patient needs post-hospital services based on physician/advanced practitioner order or complex needs related to functional status, cognitive ability, or social support system  Outcome: Progressing     Problem: NEUROSENSORY - ADULT  Goal: Achieves stable or improved neurological status  Description: INTERVENTIONS  - Monitor and report changes in neurological status  - Monitor vital signs such as temperature, blood pressure, glucose, and any other labs ordered   - Initiate measures to prevent increased intracranial pressure  - Monitor for seizure activity and implement precautions if appropriate      Outcome: Progressing     Problem: RESPIRATORY - ADULT  Goal: Achieves optimal ventilation and oxygenation  Description: INTERVENTIONS:  - Assess for changes in respiratory status  - Assess for changes in mentation and behavior  - Position to facilitate oxygenation and minimize respiratory effort  - Oxygen administered by appropriate delivery if ordered  - Initiate smoking cessation education as indicated  - Encourage broncho-pulmonary hygiene including cough, deep breathe, Incentive Spirometry  - Assess the need for suctioning and aspirate as needed  - Assess and instruct to report SOB or any respiratory difficulty  - Respiratory Therapy support as indicated  Outcome: Progressing     Problem: GASTROINTESTINAL - ADULT  Goal: Maintains or returns to baseline bowel function  Description: INTERVENTIONS:  - Assess bowel function  - Encourage oral fluids to ensure adequate hydration  - Administer IV fluids if ordered to ensure adequate hydration  - Administer ordered medications as needed  - Encourage mobilization and activity  - Consider nutritional services referral to assist patient with adequate nutrition and appropriate food choices  Outcome: Progressing  Goal: Maintains adequate nutritional intake  Description: INTERVENTIONS:  - Monitor percentage of each meal consumed  - Identify factors contributing to decreased intake, treat as appropriate  - Assist with meals as needed  - Monitor I&O, weight, and lab values if indicated  - Obtain nutrition services referral as needed  Outcome: Progressing     Problem: GENITOURINARY - ADULT  Goal: Maintains or returns to baseline urinary function  Description: INTERVENTIONS:  - Assess urinary function  - Encourage oral fluids to ensure adequate hydration if ordered  - Administer IV fluids as ordered to ensure adequate hydration  - Administer ordered medications as needed  - Offer frequent toileting  - Follow urinary retention protocol if ordered  Outcome: Progressing     Problem: METABOLIC, FLUID AND ELECTROLYTES - ADULT  Goal: Electrolytes maintained within normal limits  Description: INTERVENTIONS:  - Monitor labs and assess patient for signs and symptoms of electrolyte imbalances  - Administer electrolyte replacement as ordered  - Monitor response to electrolyte replacements, including repeat lab results as appropriate  - Instruct patient on fluid and nutrition as appropriate  Outcome: Progressing  Goal: Glucose maintained within target range  Description: INTERVENTIONS:  - Monitor Blood Glucose as ordered  - Assess for signs and symptoms of hyperglycemia and hypoglycemia  - Administer ordered medications to maintain glucose within target range  - Assess nutritional intake and initiate nutrition service referral as needed  Outcome: Progressing     Problem: SKIN/TISSUE INTEGRITY - ADULT  Goal: Skin integrity remains intact  Description: INTERVENTIONS  - Identify patients at risk for skin breakdown  - Assess and monitor skin integrity  - Assess and monitor nutrition and hydration status  - Monitor labs (i e  albumin)  - Assess for incontinence   - Turn and reposition patient  - Assist with mobility/ambulation  - Relieve pressure over bony prominences  - Avoid friction and shearing  - Provide appropriate hygiene as needed including keeping skin clean and dry  - Evaluate need for skin moisturizer/barrier cream  - Collaborate with interdisciplinary team (i e  Nutrition, Rehabilitation, etc )   - Patient/family teaching  Outcome: Progressing  Goal: Incision(s), wounds(s) or drain site(s) healing without S/S of infection  Description: INTERVENTIONS  - Assess and document risk factors for skin impairment   - Assess and document dressing, incision, wound bed, drain sites and surrounding tissue  - Consider nutrition services referral as needed  - Oral mucous membranes remain intact  - Provide patient/ family education  Outcome: Progressing  Goal: Oral mucous membranes remain intact  Description: INTERVENTIONS  - Assess oral mucosa and hygiene practices  - Implement preventative oral hygiene regimen  - Implement oral medicated treatments as ordered  - Initiate Nutrition services referral as needed  Outcome: Progressing     Problem: MUSCULOSKELETAL - ADULT  Goal: Maintain or return mobility to safest level of function  Description: INTERVENTIONS:  - Assess patient's ability to carry out ADLs; assess patient's baseline for ADL function and identify physical deficits which impact ability to perform ADLs (bathing, care of mouth/teeth, toileting, grooming, dressing, etc )  - Assess/evaluate cause of self-care deficits   - Assess range of motion  - Assess patient's mobility  - Assess patient's need for assistive devices and provide as appropriate  - Encourage maximum independence but intervene and supervise when necessary  - Involve family in performance of ADLs  - Assess for home care needs following discharge   - Consider OT consult to assist with ADL evaluation and planning for discharge  - Provide patient education as appropriate  Outcome: Progressing  Goal: Maintain proper alignment of affected body part  Description: INTERVENTIONS:  - Support, maintain and protect limb and body alignment  - Provide patient/ family with appropriate education  Outcome: Progressing

## 2020-12-25 NOTE — ASSESSMENT & PLAN NOTE
History of significant alcohol abuse for over 10 years  Reports drinking 5-6 drinks a day  No reported history of alcohol withdrawal  [facility] protocol

## 2020-12-26 LAB
ALBUMIN SERPL BCP-MCNC: 2.5 G/DL (ref 3.5–5)
ALP SERPL-CCNC: 205 U/L (ref 46–116)
ALT SERPL W P-5'-P-CCNC: 74 U/L (ref 12–78)
ANION GAP SERPL CALCULATED.3IONS-SCNC: 11 MMOL/L (ref 4–13)
AST SERPL W P-5'-P-CCNC: 164 U/L (ref 5–45)
BASOPHILS # BLD AUTO: 0.02 THOUSANDS/ΜL (ref 0–0.1)
BASOPHILS NFR BLD AUTO: 1 % (ref 0–1)
BILIRUB SERPL-MCNC: 1.2 MG/DL (ref 0.2–1)
BUN SERPL-MCNC: 17 MG/DL (ref 5–25)
CALCIUM ALBUM COR SERPL-MCNC: 9.1 MG/DL (ref 8.3–10.1)
CALCIUM SERPL-MCNC: 7.9 MG/DL (ref 8.3–10.1)
CHLORIDE SERPL-SCNC: 103 MMOL/L (ref 100–108)
CO2 SERPL-SCNC: 20 MMOL/L (ref 21–32)
CREAT SERPL-MCNC: 1.08 MG/DL (ref 0.6–1.3)
CRP SERPL QL: 46.7 MG/L
D DIMER PPP FEU-MCNC: 0.95 UG/ML FEU
EOSINOPHIL # BLD AUTO: 0.04 THOUSAND/ΜL (ref 0–0.61)
EOSINOPHIL NFR BLD AUTO: 1 % (ref 0–6)
ERYTHROCYTE [DISTWIDTH] IN BLOOD BY AUTOMATED COUNT: 19.7 % (ref 11.6–15.1)
EST. AVERAGE GLUCOSE BLD GHB EST-MCNC: 229 MG/DL
FERRITIN SERPL-MCNC: 20 NG/ML (ref 8–388)
FERRITIN SERPL-MCNC: 21 NG/ML (ref 8–388)
GFR SERPL CREATININE-BSD FRML MDRD: 71 ML/MIN/1.73SQ M
GLUCOSE SERPL-MCNC: 100 MG/DL (ref 65–140)
GLUCOSE SERPL-MCNC: 161 MG/DL (ref 65–140)
GLUCOSE SERPL-MCNC: 182 MG/DL (ref 65–140)
GLUCOSE SERPL-MCNC: 215 MG/DL (ref 65–140)
GLUCOSE SERPL-MCNC: 97 MG/DL (ref 65–140)
HBA1C MFR BLD: 9.6 %
HCT VFR BLD AUTO: 27.2 % (ref 36.5–49.3)
HGB BLD-MCNC: 7.8 G/DL (ref 12–17)
IMM GRANULOCYTES # BLD AUTO: 0.01 THOUSAND/UL (ref 0–0.2)
IMM GRANULOCYTES NFR BLD AUTO: 0 % (ref 0–2)
IRON SATN MFR SERPL: 5 %
IRON SERPL-MCNC: 18 UG/DL (ref 65–175)
LYMPHOCYTES # BLD AUTO: 0.68 THOUSANDS/ΜL (ref 0.6–4.47)
LYMPHOCYTES NFR BLD AUTO: 22 % (ref 14–44)
MAGNESIUM SERPL-MCNC: 2 MG/DL (ref 1.6–2.6)
MCH RBC QN AUTO: 19.9 PG (ref 26.8–34.3)
MCHC RBC AUTO-ENTMCNC: 28.7 G/DL (ref 31.4–37.4)
MCV RBC AUTO: 70 FL (ref 82–98)
MONOCYTES # BLD AUTO: 0.55 THOUSAND/ΜL (ref 0.17–1.22)
MONOCYTES NFR BLD AUTO: 17 % (ref 4–12)
NEUTROPHILS # BLD AUTO: 1.86 THOUSANDS/ΜL (ref 1.85–7.62)
NEUTS SEG NFR BLD AUTO: 59 % (ref 43–75)
NRBC BLD AUTO-RTO: 1 /100 WBCS
PLATELET # BLD AUTO: 87 THOUSANDS/UL (ref 149–390)
POTASSIUM SERPL-SCNC: 4.1 MMOL/L (ref 3.5–5.3)
PROCALCITONIN SERPL-MCNC: 1.14 NG/ML
PROT SERPL-MCNC: 6.5 G/DL (ref 6.4–8.2)
RBC # BLD AUTO: 3.91 MILLION/UL (ref 3.88–5.62)
SODIUM SERPL-SCNC: 134 MMOL/L (ref 136–145)
TIBC SERPL-MCNC: 368 UG/DL (ref 250–450)
WBC # BLD AUTO: 3.16 THOUSAND/UL (ref 4.31–10.16)

## 2020-12-26 PROCEDURE — 83540 ASSAY OF IRON: CPT | Performed by: STUDENT IN AN ORGANIZED HEALTH CARE EDUCATION/TRAINING PROGRAM

## 2020-12-26 PROCEDURE — 82948 REAGENT STRIP/BLOOD GLUCOSE: CPT

## 2020-12-26 PROCEDURE — 86140 C-REACTIVE PROTEIN: CPT | Performed by: STUDENT IN AN ORGANIZED HEALTH CARE EDUCATION/TRAINING PROGRAM

## 2020-12-26 PROCEDURE — 82728 ASSAY OF FERRITIN: CPT | Performed by: STUDENT IN AN ORGANIZED HEALTH CARE EDUCATION/TRAINING PROGRAM

## 2020-12-26 PROCEDURE — 83735 ASSAY OF MAGNESIUM: CPT | Performed by: STUDENT IN AN ORGANIZED HEALTH CARE EDUCATION/TRAINING PROGRAM

## 2020-12-26 PROCEDURE — 85025 COMPLETE CBC W/AUTO DIFF WBC: CPT | Performed by: STUDENT IN AN ORGANIZED HEALTH CARE EDUCATION/TRAINING PROGRAM

## 2020-12-26 PROCEDURE — 83550 IRON BINDING TEST: CPT | Performed by: STUDENT IN AN ORGANIZED HEALTH CARE EDUCATION/TRAINING PROGRAM

## 2020-12-26 PROCEDURE — 80053 COMPREHEN METABOLIC PANEL: CPT | Performed by: STUDENT IN AN ORGANIZED HEALTH CARE EDUCATION/TRAINING PROGRAM

## 2020-12-26 PROCEDURE — 94660 CPAP INITIATION&MGMT: CPT

## 2020-12-26 PROCEDURE — 85379 FIBRIN DEGRADATION QUANT: CPT | Performed by: STUDENT IN AN ORGANIZED HEALTH CARE EDUCATION/TRAINING PROGRAM

## 2020-12-26 PROCEDURE — 94760 N-INVAS EAR/PLS OXIMETRY 1: CPT

## 2020-12-26 PROCEDURE — 99233 SBSQ HOSP IP/OBS HIGH 50: CPT | Performed by: STUDENT IN AN ORGANIZED HEALTH CARE EDUCATION/TRAINING PROGRAM

## 2020-12-26 PROCEDURE — 84145 PROCALCITONIN (PCT): CPT | Performed by: STUDENT IN AN ORGANIZED HEALTH CARE EDUCATION/TRAINING PROGRAM

## 2020-12-26 PROCEDURE — 83036 HEMOGLOBIN GLYCOSYLATED A1C: CPT | Performed by: STUDENT IN AN ORGANIZED HEALTH CARE EDUCATION/TRAINING PROGRAM

## 2020-12-26 RX ORDER — LORATADINE 10 MG/1
10 TABLET ORAL DAILY
Status: DISCONTINUED | OUTPATIENT
Start: 2020-12-26 | End: 2020-12-27 | Stop reason: HOSPADM

## 2020-12-26 RX ORDER — FERROUS SULFATE 325(65) MG
325 TABLET ORAL
Status: DISCONTINUED | OUTPATIENT
Start: 2020-12-27 | End: 2020-12-27 | Stop reason: HOSPADM

## 2020-12-26 RX ORDER — LOSARTAN POTASSIUM 50 MG/1
50 TABLET ORAL DAILY
Status: DISCONTINUED | OUTPATIENT
Start: 2020-12-27 | End: 2020-12-27 | Stop reason: HOSPADM

## 2020-12-26 RX ADMIN — INSULIN LISPRO 1 UNITS: 100 INJECTION, SOLUTION INTRAVENOUS; SUBCUTANEOUS at 12:25

## 2020-12-26 RX ADMIN — IRON SUCROSE 200 MG: 20 INJECTION, SOLUTION INTRAVENOUS at 12:23

## 2020-12-26 RX ADMIN — FOLIC ACID 1 MG: 1 TABLET ORAL at 08:37

## 2020-12-26 RX ADMIN — FAMOTIDINE 20 MG: 20 TABLET ORAL at 08:38

## 2020-12-26 RX ADMIN — ZINC SULFATE 220 MG (50 MG) CAPSULE 220 MG: CAPSULE at 08:37

## 2020-12-26 RX ADMIN — HEPARIN SODIUM 5000 UNITS: 5000 INJECTION INTRAVENOUS; SUBCUTANEOUS at 05:06

## 2020-12-26 RX ADMIN — HEPARIN SODIUM 5000 UNITS: 5000 INJECTION INTRAVENOUS; SUBCUTANEOUS at 21:39

## 2020-12-26 RX ADMIN — Medication 2000 UNITS: at 08:37

## 2020-12-26 RX ADMIN — OXYCODONE HYDROCHLORIDE AND ACETAMINOPHEN 1000 MG: 500 TABLET ORAL at 08:37

## 2020-12-26 RX ADMIN — BUDESONIDE AND FORMOTEROL FUMARATE DIHYDRATE 2 PUFF: 80; 4.5 AEROSOL RESPIRATORY (INHALATION) at 18:38

## 2020-12-26 RX ADMIN — THIAMINE HCL TAB 100 MG 100 MG: 100 TAB at 08:37

## 2020-12-26 RX ADMIN — Medication 1 TABLET: at 08:37

## 2020-12-26 RX ADMIN — LORATADINE 10 MG: 10 TABLET ORAL at 12:23

## 2020-12-26 RX ADMIN — DILTIAZEM HYDROCHLORIDE 180 MG: 180 CAPSULE, COATED, EXTENDED RELEASE ORAL at 08:37

## 2020-12-26 RX ADMIN — BUDESONIDE AND FORMOTEROL FUMARATE DIHYDRATE 2 PUFF: 80; 4.5 AEROSOL RESPIRATORY (INHALATION) at 08:38

## 2020-12-26 RX ADMIN — HEPARIN SODIUM 5000 UNITS: 5000 INJECTION INTRAVENOUS; SUBCUTANEOUS at 16:36

## 2020-12-26 RX ADMIN — OXYCODONE HYDROCHLORIDE AND ACETAMINOPHEN 1000 MG: 500 TABLET ORAL at 21:39

## 2020-12-26 RX ADMIN — INSULIN LISPRO 1 UNITS: 100 INJECTION, SOLUTION INTRAVENOUS; SUBCUTANEOUS at 16:36

## 2020-12-26 NOTE — ASSESSMENT & PLAN NOTE
Unknown baseline of patient hemoglobin  Likely consistent with thrombocytopenia and long-term cirrhosis found on imaging  Iron panel shows iron deficiency  Will give 1 time dose of IV iron and discharged home with p o   Iron

## 2020-12-26 NOTE — PLAN OF CARE
Problem: Potential for Falls  Goal: Patient will remain free of falls  Description: INTERVENTIONS:  - Assess patient frequently for physical needs  -  Identify cognitive and physical deficits and behaviors that affect risk of falls  -  [place] fall precautions as indicated by assessment   - Educate patient/family on patient safety including physical limitations  - Instruct patient to call for assistance with activity based on assessment  - Modify environment to reduce risk of injury  - Consider OT/PT consult to assist with strengthening/mobility  Outcome: Progressing     Problem: INFECTION - ADULT  Goal: Absence or prevention of progression during hospitalization  Description: INTERVENTIONS:  - Assess and monitor for signs and symptoms of infection  - Monitor lab/diagnostic results  - Monitor all insertion sites, i e  indwelling lines, tubes, and drains  - Monitor endotracheal if appropriate and nasal secretions for changes in amount and color  - [place] appropriate cooling/warming therapies per order  - Administer medications as ordered  - Instruct and encourage patient and family to use good hand hygiene technique  - Identify and instruct in appropriate isolation precautions for identified infection/condition  Outcome: Progressing  Goal: Absence of fever/infection during neutropenic period  Description: INTERVENTIONS:  - Monitor WBC    Outcome: Progressing     Problem: SAFETY ADULT  Goal: Patient will remain free of falls  Description: INTERVENTIONS:  - Assess patient frequently for physical needs  -  Identify cognitive and physical deficits and behaviors that affect risk of falls    -  [place] fall precautions as indicated by assessment   - Educate patient/family on patient safety including physical limitations  - Instruct patient to call for assistance with activity based on assessment  - Modify environment to reduce risk of injury  - Consider OT/PT consult to assist with strengthening/mobility  Outcome: Progressing  Goal: Maintain or return to baseline ADL function  Description: INTERVENTIONS:  -  Assess patient's ability to carry out ADLs; assess patient's baseline for ADL function and identify physical deficits which impact ability to perform ADLs (bathing, care of mouth/teeth, toileting, grooming, dressing, etc )  - Assess/evaluate cause of self-care deficits   - Assess range of motion  - Assess patient's mobility; develop plan if impaired  - Assess patient's need for assistive devices and provide as appropriate  - Encourage maximum independence but intervene and supervise when necessary  - Involve family in performance of ADLs  - Assess for home care needs following discharge   - Consider OT consult to assist with ADL evaluation and planning for discharge  - Provide patient education as appropriate  Outcome: Progressing  Goal: Maintain or return mobility status to optimal level  Description: INTERVENTIONS:  - Assess patient's baseline mobility status (ambulation, transfers, stairs, etc )    - Identify cognitive and physical deficits and behaviors that affect mobility  - Identify mobility aids required to assist with transfers and/or ambulation (gait belt, sit-to-stand, lift, walker, cane, etc )  - [place] fall precautions as indicated by assessment  - Record patient progress and toleration of activity level on Mobility SBAR; progress patient to next Phase/Stage  - Instruct patient to call for assistance with activity based on assessment  - Consider rehabilitation consult to assist with strengthening/weightbearing, etc   Outcome: Progressing     Problem: DISCHARGE PLANNING  Goal: Discharge to home or other facility with appropriate resources  Description: INTERVENTIONS:  - Identify barriers to discharge w/patient and caregiver  - Arrange for needed discharge resources and transportation as appropriate  - Identify discharge learning needs (meds, wound care, etc )  - Arrange for interpretive services to assist at discharge as needed  - Refer to Case Management Department for coordinating discharge planning if the patient needs post-hospital services based on physician/advanced practitioner order or complex needs related to functional status, cognitive ability, or social support system  Outcome: Progressing     Problem: Knowledge Deficit  Goal: Patient/family/caregiver demonstrates understanding of disease process, treatment plan, medications, and discharge instructions  Description: Complete learning assessment and assess knowledge base    Interventions:  - Provide teaching at level of understanding  - Provide teaching via preferred learning methods  Outcome: Progressing     Problem: NEUROSENSORY - ADULT  Goal: Achieves stable or improved neurological status  Description: INTERVENTIONS  - Monitor and report changes in neurological status  - Monitor vital signs such as temperature, blood pressure, glucose, and any other labs ordered   - Initiate measures to prevent increased intracranial pressure  - Monitor for seizure activity and implement precautions if appropriate      Outcome: Progressing     Problem: RESPIRATORY - ADULT  Goal: Achieves optimal ventilation and oxygenation  Description: INTERVENTIONS:  - Assess for changes in respiratory status  - Assess for changes in mentation and behavior  - Position to facilitate oxygenation and minimize respiratory effort  - Oxygen administered by appropriate delivery if ordered  - Initiate smoking cessation education as indicated  - Encourage broncho-pulmonary hygiene including cough, deep breathe, Incentive Spirometry  - Assess the need for suctioning and aspirate as needed  - Assess and instruct to report SOB or any respiratory difficulty  - Respiratory Therapy support as indicated  Outcome: Progressing     Problem: GASTROINTESTINAL - ADULT  Goal: Maintains or returns to baseline bowel function  Description: INTERVENTIONS:  - Assess bowel function  - Encourage oral fluids to ensure adequate hydration  - Administer IV fluids if ordered to ensure adequate hydration  - Administer ordered medications as needed  - Encourage mobilization and activity  - Consider nutritional services referral to assist patient with adequate nutrition and appropriate food choices  Outcome: Progressing  Goal: Maintains adequate nutritional intake  Description: INTERVENTIONS:  - Monitor percentage of each meal consumed  - Identify factors contributing to decreased intake, treat as appropriate  - Assist with meals as needed  - Monitor I&O, weight, and lab values if indicated  - Obtain nutrition services referral as needed  Outcome: Progressing     Problem: GENITOURINARY - ADULT  Goal: Maintains or returns to baseline urinary function  Description: INTERVENTIONS:  - Assess urinary function  - Encourage oral fluids to ensure adequate hydration if ordered  - Administer IV fluids as ordered to ensure adequate hydration  - Administer ordered medications as needed  - Offer frequent toileting  - Follow urinary retention protocol if ordered  Outcome: Progressing     Problem: METABOLIC, FLUID AND ELECTROLYTES - ADULT  Goal: Electrolytes maintained within normal limits  Description: INTERVENTIONS:  - Monitor labs and assess patient for signs and symptoms of electrolyte imbalances  - Administer electrolyte replacement as ordered  - Monitor response to electrolyte replacements, including repeat lab results as appropriate  - Instruct patient on fluid and nutrition as appropriate  Outcome: Progressing  Goal: Glucose maintained within target range  Description: INTERVENTIONS:  - Monitor Blood Glucose as ordered  - Assess for signs and symptoms of hyperglycemia and hypoglycemia  - Administer ordered medications to maintain glucose within target range  - Assess nutritional intake and initiate nutrition service referral as needed  Outcome: Progressing     Problem: SKIN/TISSUE INTEGRITY - ADULT  Goal: Skin integrity remains intact  Description: INTERVENTIONS  - Identify patients at risk for skin breakdown  - Assess and monitor skin integrity  - Assess and monitor nutrition and hydration status  - Monitor labs (i e  albumin)  - Assess for incontinence   - Turn and reposition patient  - Assist with mobility/ambulation  - Relieve pressure over bony prominences  - Avoid friction and shearing  - Provide appropriate hygiene as needed including keeping skin clean and dry  - Evaluate need for skin moisturizer/barrier cream  - Collaborate with interdisciplinary team (i e  Nutrition, Rehabilitation, etc )   - Patient/family teaching  Outcome: Progressing  Goal: Incision(s), wounds(s) or drain site(s) healing without S/S of infection  Description: INTERVENTIONS  - Assess and document risk factors for skin impairment   - Assess and document dressing, incision, wound bed, drain sites and surrounding tissue  - Consider nutrition services referral as needed  - Oral mucous membranes remain intact  - Provide patient/ family education  Outcome: Progressing  Goal: Oral mucous membranes remain intact  Description: INTERVENTIONS  - Assess oral mucosa and hygiene practices  - Implement preventative oral hygiene regimen  - Implement oral medicated treatments as ordered  - Initiate Nutrition services referral as needed  Outcome: Progressing     Problem: MUSCULOSKELETAL - ADULT  Goal: Maintain or return mobility to safest level of function  Description: INTERVENTIONS:  - Assess patient's ability to carry out ADLs; assess patient's baseline for ADL function and identify physical deficits which impact ability to perform ADLs (bathing, care of mouth/teeth, toileting, grooming, dressing, etc )  - Assess/evaluate cause of self-care deficits   - Assess range of motion  - Assess patient's mobility  - Assess patient's need for assistive devices and provide as appropriate  - Encourage maximum independence but intervene and supervise when necessary  - Involve family in performance of ADLs  - Assess for home care needs following discharge   - Consider OT consult to assist with ADL evaluation and planning for discharge  - Provide patient education as appropriate  Outcome: Progressing  Goal: Maintain proper alignment of affected body part  Description: INTERVENTIONS:  - Support, maintain and protect limb and body alignment  - Provide patient/ family with appropriate education  Outcome: Progressing

## 2020-12-26 NOTE — PROGRESS NOTES
Progress Note Ofelia Baxter 1955, 77 y o  male MRN: [id number]    Unit/Bed#: -01 Encounter: [id number]    Primary Care Provider: Nito Morton MD   Date and time admitted to hospital: 12/25/2020 10:47 AM        * Pneumonia due to COVID-19 virus  Assessment & Plan  24-year-old male with past medical history of hypertension, type 2 diabetes and sleep apnea presented from and sent as a transfer and diagnosed with COVID  Diagnosed with COVID on 12/25  Currently on room air  Initiated COVID mild pathway  Continue supportive therapy  Inflammatory markers are currently flat  Will check tomorrow, if negative will discharge home    Anemia  Assessment & Plan  Unknown baseline of patient hemoglobin  Likely consistent with thrombocytopenia and long-term cirrhosis found on imaging  Iron panel shows iron deficiency  Will give 1 time dose of IV iron and discharged home with p o  Iron    Alcoholic cirrhosis (HCC)  Assessment & Plan  CT abdomen shows nodular appearance of liver consistent with cirrhosis  Long-time history of alcohol abuse  Recommend follow outpatient with GI doctor consider MRI for further imaging    ABILIO (acute kidney injury) ([facility] [address] )  Assessment & Plan  Creatinine baseline of 1  ABILIO resolved, discontinue IV fluids  Resume Arb  Encourage p o   Intake    Benign essential HTN  Assessment & Plan  Blood pressure currently well control  Will resume patient's home blood pressure losartan    Type 2 diabetes mellitus [facility])  Assessment & Plan  Lab Results   Component Value Date    HGBA1C 9 6 (H) 12/26/2020       Recent Labs     12/25/20  1540 12/25/20  2026 12/26/20  0510 12/26/20  1135   POCGLU 160* 193* 100 161*     History of type 2 diabetes on oral medications, no history of insulin  Sliding scale, Accu-Cheks      Blood Sugar Average: Last 72 hrs:  (P) 128    Alcohol abuse  Assessment & Plan  History of significant alcohol abuse for over 10 years  Reports drinking 5-6 drinks a day  No reported history of alcohol withdrawal  [place] protocol        VTE Pharmacologic Prophylaxis:   Pharmacologic: Heparin  Mechanical VTE Prophylaxis in Place: Yes    Patient Centered Rounds: I have performed bedside rounds with nursing staff today  Discussions with Specialists or Other Care Team Provider:  None    Education and Discussions with Family / Patient:  Patient    Time Spent for Care: 45 minutes  More than 50% of total time spent on counseling and coordination of care as described above  Current Length of Stay: 1 day(s)    Current Patient Status: Inpatient   Certification Statement: The patient will continue to require additional inpatient hospital stay due to Monitoring COVID pneumonia    Discharge Plan:  Tomorrow    Code Status: Level 1 - Full Code      Subjective:   Patient seen today, reports doing well will continue cough  He did have 1 episode of low-grade fevers yesterday evening which responded well to Tylenol  Objective:     Vitals:   Temp (24hrs), Av 6 °F (37 6 °C), Min:98 °F (36 7 °C), Max:100 9 °F (38 3 °C)    Temp:  [98 °F (36 7 °C)-100 9 °F (38 3 °C)] 98 °F (36 7 °C)  HR:  [101-113] 101  Resp:  [18-20] 20  BP: (112-128)/(64-74) 125/74  SpO2:  [95 %-97 %] 96 %  Body mass index is 33 09 kg/m²  Input and Output Summary (last 24 hours): Intake/Output Summary (Last 24 hours) at 2020 1309  Last data filed at 2020 0557  Gross per 24 hour   Intake 2480 ml   Output --   Net 2480 ml       Physical Exam:     Physical Exam  Vitals signs and nursing note reviewed  Constitutional:       Appearance: Normal appearance  He is normal weight  HENT:      Head: Normocephalic and atraumatic  Eyes:      Conjunctiva/sclera: Conjunctivae normal       Pupils: Pupils are equal, round, and reactive to light  Cardiovascular:      Rate and Rhythm: Normal rate and regular rhythm  Heart sounds: Normal heart sounds  Pulmonary:      Breath sounds: Normal breath sounds  No wheezing or rhonchi     Abdominal: General: Bowel sounds are normal  There is no distension  Palpations: Abdomen is soft  Tenderness: There is no abdominal tenderness  Musculoskeletal: Normal range of motion  General: No swelling  Skin:     General: Skin is warm and dry  Neurological:      General: No focal deficit present  Mental Status: He is alert and oriented to person, place, and time  Mental status is at baseline  Additional Data:     Labs:    Results from last 7 days   Lab Units 12/26/20  0503   WBC Thousand/uL 3 16*   HEMOGLOBIN g/dL 7 8*   HEMATOCRIT % 27 2*   PLATELETS Thousands/uL 87*   NEUTROS PCT % 59   LYMPHS PCT % 22   MONOS PCT % 17*   EOS PCT % 1     Results from last 7 days   Lab Units 12/26/20  0504   SODIUM mmol/L 134*   POTASSIUM mmol/L 4 1   CHLORIDE mmol/L 103   CO2 mmol/L 20*   BUN mg/dL 17   CREATININE mg/dL 1 08   ANION GAP mmol/L 11   CALCIUM mg/dL 7 9*   ALBUMIN g/dL 2 5*   TOTAL BILIRUBIN mg/dL 1 20*   ALK PHOS U/L 205*   ALT U/L 74   AST U/L 164*   GLUCOSE RANDOM mg/dL 97     Results from last 7 days   Lab Units 12/25/20  0227   INR  1 25*     Results from last 7 days   Lab Units 12/26/20  1135 12/26/20  0510 12/25/20  2026 12/25/20  1540 12/25/20  1355 12/25/20  0222   POC GLUCOSE mg/dl 161* 100 193* 160* 124 152*     Results from last 7 days   Lab Units 12/26/20  0503   HEMOGLOBIN A1C % 9 6*     Results from last 7 days   Lab Units 12/25/20  0513 12/25/20  0227   LACTIC ACID mmol/L 3 0* 3 5*           * I Have Reviewed All Lab Data Listed Above  * Additional Pertinent Lab Tests Reviewed: [address] Admission Reviewed    Imaging:    None    Recent Cultures (last 7 days):     Results from last 7 days   Lab Units 12/25/20  0513 12/25/20  0227   BLOOD CULTURE  No Growth at 24 hrs  No Growth at 24 hrs         Last 24 Hours Medication List:   Current Facility-Administered Medications   Medication Dose Route Frequency Provider Last Rate    acetaminophen  650 mg Oral Q8H PRN Fei Saldana MD      ascorbic acid  1,000 mg Oral Q12H Contreras Cheek MD      budesonide-formoterol  2 puff Inhalation BID Fei Saldana MD      cholecalciferol  2,000 Units Oral Daily Fei Saldana MD      diltiazem  180 mg Oral Daily Fei Saldana MD      famotidine  20 mg Oral Daily MD Theresa Sauceda ON 12/27/2020] ferrous sulfate  325 mg Oral Daily With Breakfast Fei Saldana MD      folic acid  1 mg Oral Daily Fei Saldana MD      heparin (porcine)  5,000 Units Subcutaneous [facility] Fei Saldana MD      insulin lispro  1-5 Units Subcutaneous TID [facility] Fei Saldana MD      iron sucrose  200 mg Intravenous Once Fei Saldana MD      Labetalol HCl  10 mg Intravenous Q6H PRN Fei Saldana MD      loratadine  10 mg Oral Daily MD Theresa Sauceda ON 12/27/2020] losartan  50 mg Oral Daily Fei Saldana MD      zinc sulfate  220 mg Oral Daily Fei Saldana MD      Followed by   Jorge Sánchez ON 1/1/2021] multivitamin-minerals  1 tablet Oral Daily Fei Saldana MD      multivitamin-minerals  1 tablet Oral Daily Fei Saldana MD      thiamine  100 mg Oral Daily Fei Saldana MD          Today, Patient Was Seen By: Fei Saldana MD    ** Please Note: Dictation voice to text software may have been used in the creation of this document   **

## 2020-12-26 NOTE — UTILIZATION REVIEW
Initial Clinical Review patient presented to the [facility] ED on 12/25/20 with cough and worsening SOB, COVID-19 positive, transferred to the [facility] for admission to Med Surg unit  Admission: Date/Time/Statement:   Admission Orders (From admission, onward)     Ordered        12/25/20 1217  Inpatient Admission  Once                   Orders Placed This Encounter   Procedures    Inpatient Admission     Standing Status:   Standing     Number of Occurrences:   1     Order Specific Question:   Admitting Physician     Answer:   Niurka Weller [[id number]]     Order Specific Question:   Level of Care     Answer:   Med Surg [[id number]]     Order Specific Question:   Estimated length of stay     Answer:   More than 2 Midnights     Order Specific Question:   Certification     Answer:   I certify that inpatient services are medically necessary for this patient for a duration of greater than two midnights  See H&P and MD Progress Notes for additional information about the patient's course of treatment  ED Arrival Information     Patient not seen in ED - transferred from [facility] ED to [facility] Med Surg unit                     HPI:    77 y o  male with PMH of DM, HTN, MEG and ETOH abuse who presented to the [facility] ED with persistent cough over several weeks  He was previously seeing his PCP who changed his ramipril to losartan with no improvement over several weeks  He reports previously traveling to [facility] during the pandemic and has a significant history of drinking 4-6 drinks daily  He was diagnosed with COVID and was sent here for monitoring  CT imaging showed nodular appearance of the liver consistent with cirrhosis  ED labs revealed hemoglobin of 7 9, elevated liver enzymes, and elevated creatinine (unknown baseline)  Physical exam: Obese, protruding abdomen       Plan: Inpatient Med Surg admission for evaluation and treatment of pneumonia due to COVID-19 virus and ABILIO  Trend inflammatory markers, check iron panel, CIWA monitoring, IV fluids, BMP in a m     12/26 Internal Medicine:Episode of low grade fever yesterday evening which responded to Tylenol  Re-check inflammatory markers in a m  Iron panel shows iron deficiency, will give one time dose of IV iron and D/C with PO iron  ABILIO resolved, (creatinine baseline of 1) D/C IV fluids, resume ARB, encourage PO intake  Admission  Vitals   Temperature Pulse Respirations Blood Pressure SpO2   12/25/20 1636 12/25/20 1343 12/25/20 1343 12/25/20 1343 12/25/20 1223   (!) 100 9 °F (38 3 °C) (!) 106 18 124/69 96 %   No Pain          Wt Readings from Last 1 Encounters:   12/26/20 105 kg (230 lb 9 6 oz)     Additional Vital Signs:     Date/Time  Temp  Pulse  Resp  BP  MAP (mmHg)  SpO2  O2 Flow Rate (L/min)  CIWA O2 Device   12/26/20 0552  98 °F (36 7 °C)  --  20  125/74  88  --  --  CPAP   12/26/20 0220  --  --  --  --  --  96 %  --  Other (comment)   12/25/20 2300  --  --  --  --  --  95 %  --  Other (comment)   12/25/20 21:35:38  99 8 °F (37 7 °C)  101  19  112/64  80  97 %  --  --   12/25/20 2043  --  --  --  --  --  --  --  None (Room air)   12/25/20 18:49:54  99 7 °F (37 6 °C)  101  --  128/67  87  97 %  --      0 None (Room air)   12/25/20 16:36:26  100 9 °F (38 3 °C)Abnormal   109Abnormal   20  124/68  87  97 %  --      0 --   12/25/20 1500  --  113Abnormal   --  --  --  --  --      0 --   12/25/20 13:43:06  --  106Abnormal   18  124/69  87  96 %  --      0 None (Room air)                 Pertinent Labs/Diagnostic Test Results:     12/25 CT CAP:  There is no evidence of large central pulmonary embolism  Evaluation of the segmental and subsegmental branches is markedly limited  There are few mildly prominent right hilar nodes, possibly reactive  Hepatomegaly  Findings suggesting portal hypertension, including splenomegaly, varices, prominence of the portal vein, etc     12/25 CXR:  No focal consolidation, pleural effusion, or pneumothorax      12/25 EKG: Normal sinus rhythm  Cannot rule out Anterior infarct , age undetermined  Abnormal ECG   No previous ECGs available            Results from last 7 days   Lab Units 12/25/20 0227   SARS-COV-2  Positive*     Results from last 7 days   Lab Units 12/26/20  0503 12/25/20 0227   WBC Thousand/uL 3 16* 4 64   HEMOGLOBIN g/dL 7 8* 7 9*   HEMATOCRIT % 27 2* 28 2*   PLATELETS Thousands/uL 87* 109*   NEUTROS ABS Thousands/µL 1 86 3 23         Results from last 7 days   Lab Units 12/26/20  0504 12/25/20 0227   SODIUM mmol/L 134* 132*   POTASSIUM mmol/L 4 1 3 6   CHLORIDE mmol/L 103 98*   CO2 mmol/L 20* 19*   ANION GAP mmol/L 11 15*   BUN mg/dL 17 25   CREATININE mg/dL 1 08 1 45*   EGFR ml/min/1 73sq m 71 49   CALCIUM mg/dL 7 9* 8 7   MAGNESIUM mg/dL 2 0  --      Results from last 7 days   Lab Units 12/26/20  0504 12/25/20 0227   AST U/L 164* 94*   ALT U/L 74 63   ALK PHOS U/L 205* 203*   TOTAL PROTEIN g/dL 6 5 6 9   ALBUMIN g/dL 2 5* 2 8*   TOTAL BILIRUBIN mg/dL 1 20* 1 29*     Results from last 7 days   Lab Units 12/26/20  0510 12/25/20  2026 12/25/20  1540 12/25/20  1355 12/25/20 0222   POC GLUCOSE mg/dl 100 193* 160* 124 152*     Results from last 7 days   Lab Units 12/26/20  0504 12/25/20 0227   GLUCOSE RANDOM mg/dL 97 156*             BETA-HYDROXYBUTYRATE   Date Value Ref Range Status   12/25/2020 0 1 <0 6 mmol/L Final          Results from last 7 days   Lab Units 12/25/20 0227   PH JENIFER  7 436*   PCO2 JENIFER mm Hg 24 3*   PO2 JENIFER mm Hg 62 8*   HCO3 JENIFER mmol/L 16 0*   BASE EXC JENIFER mmol/L -7 1   O2 CONTENT JENIFER ml/dL 10 9   O2 HGB, VENOUS % 87 5*             Results from last 7 days   Lab Units 12/25/20 0227   TROPONIN I ng/mL 0 02     Results from last 7 days   Lab Units 12/26/20  0504 12/25/20  0227   D-DIMER QUANTITATIVE ug/ml FEU 0 95* 0 85*     Results from last 7 days   Lab Units 12/25/20  0227   PROTIME seconds 15 8*   INR  1 25*   PTT seconds 36             Results from last 7 days   Lab Units 12/25/20  0513 12/25/20 0227   LACTIC ACID mmol/L 3 0* 3 5*             Results from last 7 days   Lab Units 12/25/20 0227   NT-PRO BNP pg/mL 79     Results from last 7 days   Lab Units 12/25/20 0227   FERRITIN ng/mL 15             Results from last 7 days   Lab Units 12/26/20  0504   CRP mg/L 46 7*             Results from last 7 days   Lab Units 12/25/20 0227   INFLUENZA A PCR  Negative   INFLUENZA B PCR  Negative   RSV PCR  Negative             Results from last 7 days   Lab Units 12/25/20 0227   ETHANOL LVL mg/dL 158*                 Results from last 7 days   Lab Units 12/25/20  0513 12/25/20 0227   BLOOD CULTURE  Received in Microbiology Lab  Culture in Progress  No Growth at 24 hrs  Past Medical History:   Diagnosis Date    Arrhythmia     Diabetes mellitus ([facility] [address] )     History of echocardiogram 11/14/2017    showed EF of 50-55 percentWith moderate LVH and left ventricle diastolic dysfunction  Left atrium was moderately enlarged  Trace MR noted   History of Holter monitoring 11/21/2017    showed baseline rhythm of sinus origin with an average heart it of 61 bpm  The lowest heart rate was 49 and the highest heart rate was 10 8 bpm  There were rare single VPCs, and frequent PACs representing 3 2% of total beats  There were several episodes of sinus arrhythmias with sinus bradycardia and heart rate ranging from 40-90 bpm  No sustained dysrhythmias, or pauses noted  The patient did not    Hypertension     Obese      Present on Admission:  **None**      Admitting Diagnosis: Pneumonia due to COVID-19 virus [U07 1, J12 89]  Age/Sex: 77 y o  male           Admission Orders:    SCD, CPAP at , continuous pulse oximetry, CIWA, incentive spirometry      Scheduled Medications:  ascorbic acid, 1,000 mg, Oral, Q12H LEEANNE  budesonide-formoterol, 2 puff, Inhalation, BID  cholecalciferol, 2,000 Units, Oral, Daily  diltiazem, 180 mg, Oral, Daily  famotidine, 20 mg, Oral, Daily  [START ON 12/27/2020] ferrous sulfate, 325 mg, Oral, Daily With Breakfast  folic acid, 1 mg, Oral, Daily  heparin (porcine), 5,000 Units, Subcutaneous, Q8H [address]  insulin lispro, 1-5 Units, Subcutaneous, TID AC  iron sucrose, 200 mg, Intravenous, Once  loratadine, 10 mg, Oral, Daily  zinc sulfate, 220 mg, Oral, Daily    Followed by  Radha Grubbs ON 1/1/2021] multivitamin-minerals, 1 tablet, Oral, Daily  multivitamin-minerals, 1 tablet, Oral, Daily  thiamine, 100 mg, Oral, Daily      Continuous IV Infusions:    sodium chloride 0 9 % infusion   Rate: 100 mL/hr Dose: 100 mL/hr  Freq: Continuous Route: IV  Indications of Use: IV Hydration  Last Dose: 100 mL/hr (12/25/20 2100)  Start: 12/25/20 1230 End: 12/26/20 0903     PRN Meds:  acetaminophen, 650 mg, Oral, Q8H PRN  Labetalol HCl, 10 mg, Intravenous, Q6H PRN          Network Utilization Review Department  ATTENTION: Please call with any questions or concerns to [phone] and carefully listen to the prompts so that you are directed to the right person  All voicemails are confidential   Kalen More all requests for admission clinical reviews, approved or denied determinations and any other requests to dedicated fax number below belonging to the campus where the patient is receiving treatment   List of dedicated fax numbers for the Facilities:  [address] DENIALS (Administrative/Medical Necessity) [phone]   [address] (Maternity/NICU/Pediatrics) [phone]   [address] [id number] [facility] Kyler Gregorio 7339 (  Aruna Brenner "Mary Grace" 103) [id number] [facility] [address] [place] [address]   [address] - [place] [address] [phone]

## 2020-12-26 NOTE — ASSESSMENT & PLAN NOTE
Creatinine baseline of 1  ABILIO resolved, discontinue IV fluids  Resume Arb  Encourage p o   Intake

## 2020-12-26 NOTE — ASSESSMENT & PLAN NOTE
History of significant alcohol abuse for over 10 years  Reports drinking 5-6 drinks a day  No reported history of alcohol withdrawal  [facility] protocol

## 2020-12-26 NOTE — ASSESSMENT & PLAN NOTE
49-year-old male with past medical history of hypertension, type 2 diabetes and sleep apnea presented from and sent as a transfer and diagnosed with COVID    Diagnosed with COVID on 12/25  Currently on room air  Initiated COVID mild pathway  Continue supportive therapy  Inflammatory markers are currently flat  Will check tomorrow, if negative will discharge home

## 2020-12-26 NOTE — ASSESSMENT & PLAN NOTE
Lab Results   Component Value Date    HGBA1C 9 6 (H) 12/26/2020       Recent Labs     12/25/20  1540 12/25/20 2026 12/26/20  0510 12/26/20  1135   POCGLU 160* 193* 100 161*     History of type 2 diabetes on oral medications, no history of insulin  Sliding scale, Accu-Cheks      Blood Sugar Average: Last 72 hrs:  (P) 128

## 2020-12-27 VITALS
BODY MASS INDEX: 32.98 KG/M2 | SYSTOLIC BLOOD PRESSURE: 118 MMHG | HEIGHT: 70 IN | RESPIRATION RATE: 19 BRPM | OXYGEN SATURATION: 96 % | WEIGHT: 230.38 LBS | TEMPERATURE: 100 F | HEART RATE: 113 BPM | DIASTOLIC BLOOD PRESSURE: 67 MMHG

## 2020-12-27 LAB
ALBUMIN SERPL BCP-MCNC: 2.3 G/DL (ref 3.5–5)
ALP SERPL-CCNC: 207 U/L (ref 46–116)
ALT SERPL W P-5'-P-CCNC: 65 U/L (ref 12–78)
ANION GAP SERPL CALCULATED.3IONS-SCNC: 12 MMOL/L (ref 4–13)
AST SERPL W P-5'-P-CCNC: 118 U/L (ref 5–45)
BASOPHILS # BLD AUTO: 0.03 THOUSANDS/ΜL (ref 0–0.1)
BASOPHILS NFR BLD AUTO: 1 % (ref 0–1)
BILIRUB SERPL-MCNC: 1.1 MG/DL (ref 0.2–1)
BUN SERPL-MCNC: 15 MG/DL (ref 5–25)
CALCIUM ALBUM COR SERPL-MCNC: 9.3 MG/DL (ref 8.3–10.1)
CALCIUM SERPL-MCNC: 7.9 MG/DL (ref 8.3–10.1)
CHLORIDE SERPL-SCNC: 103 MMOL/L (ref 100–108)
CO2 SERPL-SCNC: 20 MMOL/L (ref 21–32)
CREAT SERPL-MCNC: 1.1 MG/DL (ref 0.6–1.3)
CRP SERPL QL: 45.6 MG/L
D DIMER PPP FEU-MCNC: 0.94 UG/ML FEU
EOSINOPHIL # BLD AUTO: 0.06 THOUSAND/ΜL (ref 0–0.61)
EOSINOPHIL NFR BLD AUTO: 2 % (ref 0–6)
ERYTHROCYTE [DISTWIDTH] IN BLOOD BY AUTOMATED COUNT: 19.9 % (ref 11.6–15.1)
FERRITIN SERPL-MCNC: 72 NG/ML (ref 8–388)
GFR SERPL CREATININE-BSD FRML MDRD: 69 ML/MIN/1.73SQ M
GLUCOSE SERPL-MCNC: 151 MG/DL (ref 65–140)
GLUCOSE SERPL-MCNC: 155 MG/DL (ref 65–140)
GLUCOSE SERPL-MCNC: 188 MG/DL (ref 65–140)
HCT VFR BLD AUTO: 27.5 % (ref 36.5–49.3)
HGB BLD-MCNC: 7.8 G/DL (ref 12–17)
IMM GRANULOCYTES # BLD AUTO: 0.02 THOUSAND/UL (ref 0–0.2)
IMM GRANULOCYTES NFR BLD AUTO: 1 % (ref 0–2)
LYMPHOCYTES # BLD AUTO: 0.7 THOUSANDS/ΜL (ref 0.6–4.47)
LYMPHOCYTES NFR BLD AUTO: 27 % (ref 14–44)
MCH RBC QN AUTO: 19.7 PG (ref 26.8–34.3)
MCHC RBC AUTO-ENTMCNC: 28.4 G/DL (ref 31.4–37.4)
MCV RBC AUTO: 70 FL (ref 82–98)
MONOCYTES # BLD AUTO: 0.36 THOUSAND/ΜL (ref 0.17–1.22)
MONOCYTES NFR BLD AUTO: 14 % (ref 4–12)
NEUTROPHILS # BLD AUTO: 1.42 THOUSANDS/ΜL (ref 1.85–7.62)
NEUTS SEG NFR BLD AUTO: 55 % (ref 43–75)
NRBC BLD AUTO-RTO: 1 /100 WBCS
PLATELET # BLD AUTO: 81 THOUSANDS/UL (ref 149–390)
POTASSIUM SERPL-SCNC: 3.6 MMOL/L (ref 3.5–5.3)
PROCALCITONIN SERPL-MCNC: 1.24 NG/ML
PROT SERPL-MCNC: 6.1 G/DL (ref 6.4–8.2)
RBC # BLD AUTO: 3.95 MILLION/UL (ref 3.88–5.62)
SODIUM SERPL-SCNC: 135 MMOL/L (ref 136–145)
WBC # BLD AUTO: 2.59 THOUSAND/UL (ref 4.31–10.16)

## 2020-12-27 PROCEDURE — 99239 HOSP IP/OBS DSCHRG MGMT >30: CPT | Performed by: STUDENT IN AN ORGANIZED HEALTH CARE EDUCATION/TRAINING PROGRAM

## 2020-12-27 PROCEDURE — 86140 C-REACTIVE PROTEIN: CPT | Performed by: STUDENT IN AN ORGANIZED HEALTH CARE EDUCATION/TRAINING PROGRAM

## 2020-12-27 PROCEDURE — 82948 REAGENT STRIP/BLOOD GLUCOSE: CPT

## 2020-12-27 PROCEDURE — 85025 COMPLETE CBC W/AUTO DIFF WBC: CPT | Performed by: STUDENT IN AN ORGANIZED HEALTH CARE EDUCATION/TRAINING PROGRAM

## 2020-12-27 PROCEDURE — 82728 ASSAY OF FERRITIN: CPT | Performed by: STUDENT IN AN ORGANIZED HEALTH CARE EDUCATION/TRAINING PROGRAM

## 2020-12-27 PROCEDURE — 80053 COMPREHEN METABOLIC PANEL: CPT | Performed by: STUDENT IN AN ORGANIZED HEALTH CARE EDUCATION/TRAINING PROGRAM

## 2020-12-27 PROCEDURE — 84145 PROCALCITONIN (PCT): CPT | Performed by: STUDENT IN AN ORGANIZED HEALTH CARE EDUCATION/TRAINING PROGRAM

## 2020-12-27 PROCEDURE — 94760 N-INVAS EAR/PLS OXIMETRY 1: CPT

## 2020-12-27 PROCEDURE — 85379 FIBRIN DEGRADATION QUANT: CPT | Performed by: STUDENT IN AN ORGANIZED HEALTH CARE EDUCATION/TRAINING PROGRAM

## 2020-12-27 RX ORDER — FOLIC ACID 1 MG/1
1 TABLET ORAL DAILY
Qty: 30 TABLET | Refills: 0 | Status: SHIPPED | OUTPATIENT
Start: 2020-12-28 | End: 2021-01-27

## 2020-12-27 RX ORDER — MELATONIN
2000 DAILY
Qty: 60 TABLET | Refills: 0 | Status: SHIPPED | OUTPATIENT
Start: 2020-12-28 | End: 2021-01-27

## 2020-12-27 RX ORDER — PANTOPRAZOLE SODIUM 40 MG/1
40 TABLET, DELAYED RELEASE ORAL DAILY
Qty: 30 TABLET | Refills: 0 | Status: SHIPPED | OUTPATIENT
Start: 2020-12-27 | End: 2021-01-26

## 2020-12-27 RX ORDER — ZINC SULFATE 50(220)MG
220 CAPSULE ORAL DAILY
Qty: 4 CAPSULE | Refills: 0 | Status: SHIPPED | OUTPATIENT
Start: 2020-12-28 | End: 2021-01-03 | Stop reason: HOSPADM

## 2020-12-27 RX ORDER — CEFADROXIL 500 MG/1
500 CAPSULE ORAL EVERY 12 HOURS SCHEDULED
Qty: 14 CAPSULE | Refills: 0 | Status: SHIPPED | OUTPATIENT
Start: 2020-12-27 | End: 2021-01-03 | Stop reason: HOSPADM

## 2020-12-27 RX ORDER — LANOLIN ALCOHOL/MO/W.PET/CERES
100 CREAM (GRAM) TOPICAL DAILY
Qty: 30 TABLET | Refills: 0 | Status: SHIPPED | OUTPATIENT
Start: 2020-12-28 | End: 2021-01-27

## 2020-12-27 RX ORDER — LORATADINE 10 MG/1
10 TABLET ORAL DAILY
Qty: 30 TABLET | Refills: 0 | Status: SHIPPED | OUTPATIENT
Start: 2020-12-28 | End: 2021-01-27

## 2020-12-27 RX ORDER — FERROUS SULFATE 325(65) MG
325 TABLET ORAL
Qty: 30 TABLET | Refills: 0 | Status: SHIPPED | OUTPATIENT
Start: 2020-12-28 | End: 2022-05-19

## 2020-12-27 RX ADMIN — Medication 1 TABLET: at 08:42

## 2020-12-27 RX ADMIN — Medication 2000 UNITS: at 08:42

## 2020-12-27 RX ADMIN — THIAMINE HCL TAB 100 MG 100 MG: 100 TAB at 08:43

## 2020-12-27 RX ADMIN — OXYCODONE HYDROCHLORIDE AND ACETAMINOPHEN 1000 MG: 500 TABLET ORAL at 08:43

## 2020-12-27 RX ADMIN — FERROUS SULFATE TAB 325 MG (65 MG ELEMENTAL FE) 325 MG: 325 (65 FE) TAB at 08:43

## 2020-12-27 RX ADMIN — FOLIC ACID 1 MG: 1 TABLET ORAL at 08:43

## 2020-12-27 RX ADMIN — DILTIAZEM HYDROCHLORIDE 180 MG: 180 CAPSULE, COATED, EXTENDED RELEASE ORAL at 08:42

## 2020-12-27 RX ADMIN — LOSARTAN POTASSIUM 50 MG: 50 TABLET, FILM COATED ORAL at 08:43

## 2020-12-27 RX ADMIN — BUDESONIDE AND FORMOTEROL FUMARATE DIHYDRATE 2 PUFF: 80; 4.5 AEROSOL RESPIRATORY (INHALATION) at 08:42

## 2020-12-27 RX ADMIN — HEPARIN SODIUM 5000 UNITS: 5000 INJECTION INTRAVENOUS; SUBCUTANEOUS at 05:59

## 2020-12-27 RX ADMIN — FAMOTIDINE 20 MG: 20 TABLET ORAL at 08:42

## 2020-12-27 RX ADMIN — INSULIN LISPRO 1 UNITS: 100 INJECTION, SOLUTION INTRAVENOUS; SUBCUTANEOUS at 08:43

## 2020-12-27 RX ADMIN — ZINC SULFATE 220 MG (50 MG) CAPSULE 220 MG: CAPSULE at 08:43

## 2020-12-27 RX ADMIN — LORATADINE 10 MG: 10 TABLET ORAL at 08:42

## 2020-12-27 NOTE — ASSESSMENT & PLAN NOTE
Unknown baseline of patient hemoglobin  Likely consistent with thrombocytopenia and long-term cirrhosis found on imaging  Iron panel shows iron deficiency  Given 1 time dose of IV iron, discharged with p o   Iron to take with vitamin-C daily

## 2020-12-27 NOTE — PLAN OF CARE
Problem: Potential for Falls  Goal: Patient will remain free of falls  Description: INTERVENTIONS:  - Assess patient frequently for physical needs  -  Identify cognitive and physical deficits and behaviors that affect risk of falls    -  [place] fall precautions as indicated by assessment   - Educate patient/family on patient safety including physical limitations  - Instruct patient to call for assistance with activity based on assessment  - Modify environment to reduce risk of injury  - Consider OT/PT consult to assist with strengthening/mobility  12/27/2020 1105 by Dixie York RN  Outcome: Adequate for Discharge  12/27/2020 0844 by Dixie York RN  Outcome: Progressing     Problem: INFECTION - ADULT  Goal: Absence or prevention of progression during hospitalization  Description: INTERVENTIONS:  - Assess and monitor for signs and symptoms of infection  - Monitor lab/diagnostic results  - Monitor all insertion sites, i e  indwelling lines, tubes, and drains  - Monitor endotracheal if appropriate and nasal secretions for changes in amount and color  - [place] appropriate cooling/warming therapies per order  - Administer medications as ordered  - Instruct and encourage patient and family to use good hand hygiene technique  - Identify and instruct in appropriate isolation precautions for identified infection/condition  12/27/2020 1105 by Dixie York RN  Outcome: Adequate for Discharge  12/27/2020 0844 by Dixie York RN  Outcome: Progressing  Goal: Absence of fever/infection during neutropenic period  Description: INTERVENTIONS:  - Monitor WBC    12/27/2020 1105 by Dixie York RN  Outcome: Adequate for Discharge  12/27/2020 0844 by Dixie York RN  Outcome: Progressing     Problem: SAFETY ADULT  Goal: Patient will remain free of falls  Description: INTERVENTIONS:  - Assess patient frequently for physical needs  -  Identify cognitive and physical deficits and behaviors that affect risk of falls   -  [place] fall precautions as indicated by assessment   - Educate patient/family on patient safety including physical limitations  - Instruct patient to call for assistance with activity based on assessment  - Modify environment to reduce risk of injury  - Consider OT/PT consult to assist with strengthening/mobility  12/27/2020 1105 by Shasha Dillard RN  Outcome: Adequate for Discharge  12/27/2020 0844 by Shasha Dillard RN  Outcome: Progressing  Goal: Maintain or return to baseline ADL function  Description: INTERVENTIONS:  -  Assess patient's ability to carry out ADLs; assess patient's baseline for ADL function and identify physical deficits which impact ability to perform ADLs (bathing, care of mouth/teeth, toileting, grooming, dressing, etc )  - Assess/evaluate cause of self-care deficits   - Assess range of motion  - Assess patient's mobility; develop plan if impaired  - Assess patient's need for assistive devices and provide as appropriate  - Encourage maximum independence but intervene and supervise when necessary  - Involve family in performance of ADLs  - Assess for home care needs following discharge   - Consider OT consult to assist with ADL evaluation and planning for discharge  - Provide patient education as appropriate  12/27/2020 1105 by Shasha Dillard RN  Outcome: Adequate for Discharge  12/27/2020 0844 by Shasha Dillard RN  Outcome: Progressing  Goal: Maintain or return mobility status to optimal level  Description: INTERVENTIONS:  - Assess patient's baseline mobility status (ambulation, transfers, stairs, etc )    - Identify cognitive and physical deficits and behaviors that affect mobility  - Identify mobility aids required to assist with transfers and/or ambulation (gait belt, sit-to-stand, lift, walker, cane, etc )  - [place] fall precautions as indicated by assessment  - Record patient progress and toleration of activity level on Mobility SBAR; progress patient to next Phase/Stage  - Instruct patient to call for assistance with activity based on assessment  - Consider rehabilitation consult to assist with strengthening/weightbearing, etc   12/27/2020 1105 by Yane Méndez RN  Outcome: Adequate for Discharge  12/27/2020 0844 by Yane Méndez RN  Outcome: Progressing     Problem: DISCHARGE PLANNING  Goal: Discharge to home or other facility with appropriate resources  Description: INTERVENTIONS:  - Identify barriers to discharge w/patient and caregiver  - Arrange for needed discharge resources and transportation as appropriate  - Identify discharge learning needs (meds, wound care, etc )  - Arrange for interpretive services to assist at discharge as needed  - Refer to Case Management Department for coordinating discharge planning if the patient needs post-hospital services based on physician/advanced practitioner order or complex needs related to functional status, cognitive ability, or social support system  12/27/2020 1105 by Yane Méndez RN  Outcome: Adequate for Discharge  12/27/2020 0844 by Yane Méndez RN  Outcome: Progressing     Problem: Knowledge Deficit  Goal: Patient/family/caregiver demonstrates understanding of disease process, treatment plan, medications, and discharge instructions  Description: Complete learning assessment and assess knowledge base    Interventions:  - Provide teaching at level of understanding  - Provide teaching via preferred learning methods  12/27/2020 1105 by Yane Méndez RN  Outcome: Adequate for Discharge  12/27/2020 0844 by Yane Méndez RN  Outcome: Progressing     Problem: NEUROSENSORY - ADULT  Goal: Achieves stable or improved neurological status  Description: INTERVENTIONS  - Monitor and report changes in neurological status  - Monitor vital signs such as temperature, blood pressure, glucose, and any other labs ordered   - Initiate measures to prevent increased intracranial pressure  - Monitor for seizure activity and implement precautions if appropriate      12/27/2020 1105 by Chula Wood RN  Outcome: Adequate for Discharge  12/27/2020 0844 by Chula Wood RN  Outcome: Progressing     Problem: RESPIRATORY - ADULT  Goal: Achieves optimal ventilation and oxygenation  Description: INTERVENTIONS:  - Assess for changes in respiratory status  - Assess for changes in mentation and behavior  - Position to facilitate oxygenation and minimize respiratory effort  - Oxygen administered by appropriate delivery if ordered  - Initiate smoking cessation education as indicated  - Encourage broncho-pulmonary hygiene including cough, deep breathe, Incentive Spirometry  - Assess the need for suctioning and aspirate as needed  - Assess and instruct to report SOB or any respiratory difficulty  - Respiratory Therapy support as indicated  12/27/2020 1105 by Chula Wood RN  Outcome: Adequate for Discharge  12/27/2020 0844 by Chula Wood RN  Outcome: Progressing     Problem: GASTROINTESTINAL - ADULT  Goal: Maintains or returns to baseline bowel function  Description: INTERVENTIONS:  - Assess bowel function  - Encourage oral fluids to ensure adequate hydration  - Administer IV fluids if ordered to ensure adequate hydration  - Administer ordered medications as needed  - Encourage mobilization and activity  - Consider nutritional services referral to assist patient with adequate nutrition and appropriate food choices  12/27/2020 1105 by Chula Wood RN  Outcome: Adequate for Discharge  12/27/2020 0844 by Chula Wood RN  Outcome: Progressing  Goal: Maintains adequate nutritional intake  Description: INTERVENTIONS:  - Monitor percentage of each meal consumed  - Identify factors contributing to decreased intake, treat as appropriate  - Assist with meals as needed  - Monitor I&O, weight, and lab values if indicated  - Obtain nutrition services referral as needed  12/27/2020 1105 by Chula Wood RN  Outcome: Adequate for Discharge  12/27/2020 0844 by Birdie Lennox, RN  Outcome: Progressing     Problem: GENITOURINARY - ADULT  Goal: Maintains or returns to baseline urinary function  Description: INTERVENTIONS:  - Assess urinary function  - Encourage oral fluids to ensure adequate hydration if ordered  - Administer IV fluids as ordered to ensure adequate hydration  - Administer ordered medications as needed  - Offer frequent toileting  - Follow urinary retention protocol if ordered  12/27/2020 1105 by Birdie Lennox, RN  Outcome: Adequate for Discharge  12/27/2020 0844 by Birdie Lennox, RN  Outcome: Progressing     Problem: METABOLIC, FLUID AND ELECTROLYTES - ADULT  Goal: Electrolytes maintained within normal limits  Description: INTERVENTIONS:  - Monitor labs and assess patient for signs and symptoms of electrolyte imbalances  - Administer electrolyte replacement as ordered  - Monitor response to electrolyte replacements, including repeat lab results as appropriate  - Instruct patient on fluid and nutrition as appropriate  12/27/2020 1105 by Birdie Lennox, RN  Outcome: Adequate for Discharge  12/27/2020 0844 by Birdie Lennox, RN  Outcome: Progressing  Goal: Glucose maintained within target range  Description: INTERVENTIONS:  - Monitor Blood Glucose as ordered  - Assess for signs and symptoms of hyperglycemia and hypoglycemia  - Administer ordered medications to maintain glucose within target range  - Assess nutritional intake and initiate nutrition service referral as needed  12/27/2020 1105 by Birdie Lennox, RN  Outcome: Adequate for Discharge  12/27/2020 0844 by Birdie Lennox, RN  Outcome: Progressing     Problem: SKIN/TISSUE INTEGRITY - ADULT  Goal: Skin integrity remains intact  Description: INTERVENTIONS  - Identify patients at risk for skin breakdown  - Assess and monitor skin integrity  - Assess and monitor nutrition and hydration status  - Monitor labs (i e  albumin)  - Assess for incontinence   - Turn and reposition patient  - Assist with mobility/ambulation  - Relieve pressure over bony prominences  - Avoid friction and shearing  - Provide appropriate hygiene as needed including keeping skin clean and dry  - Evaluate need for skin moisturizer/barrier cream  - Collaborate with interdisciplinary team (i e  Nutrition, Rehabilitation, etc )   - Patient/family teaching  12/27/2020 1105 by Birdie Lennox, RN  Outcome: Adequate for Discharge  12/27/2020 0844 by Birdie Lennox, RN  Outcome: Progressing  Goal: Incision(s), wounds(s) or drain site(s) healing without S/S of infection  Description: INTERVENTIONS  - Assess and document risk factors for skin impairment   - Assess and document dressing, incision, wound bed, drain sites and surrounding tissue  - Consider nutrition services referral as needed  - Oral mucous membranes remain intact  - Provide patient/ family education  12/27/2020 1105 by Birdie Lennox, RN  Outcome: Adequate for Discharge  12/27/2020 0844 by Birdie Lennox, RN  Outcome: Progressing  Goal: Oral mucous membranes remain intact  Description: INTERVENTIONS  - Assess oral mucosa and hygiene practices  - Implement preventative oral hygiene regimen  - Implement oral medicated treatments as ordered  - Initiate Nutrition services referral as needed  12/27/2020 1105 by Birdie Lennox, RN  Outcome: Adequate for Discharge  12/27/2020 0844 by Birdie Lennox, RN  Outcome: Progressing     Problem: MUSCULOSKELETAL - ADULT  Goal: Maintain or return mobility to safest level of function  Description: INTERVENTIONS:  - Assess patient's ability to carry out ADLs; assess patient's baseline for ADL function and identify physical deficits which impact ability to perform ADLs (bathing, care of mouth/teeth, toileting, grooming, dressing, etc )  - Assess/evaluate cause of self-care deficits   - Assess range of motion  - Assess patient's mobility  - Assess patient's need for assistive devices and provide as appropriate  - Encourage maximum independence but intervene and supervise when necessary  - Involve family in performance of ADLs  - Assess for home care needs following discharge   - Consider OT consult to assist with ADL evaluation and planning for discharge  - Provide patient education as appropriate  12/27/2020 1105 by Howard Stephens RN  Outcome: Adequate for Discharge  12/27/2020 0844 by Howard Stephens RN  Outcome: Progressing  Goal: Maintain proper alignment of affected body part  Description: INTERVENTIONS:  - Support, maintain and protect limb and body alignment  - Provide patient/ family with appropriate education  12/27/2020 1105 by Howard Stephens RN  Outcome: Adequate for Discharge  12/27/2020 0844 by Howard Stephens RN  Outcome: Progressing

## 2020-12-27 NOTE — DISCHARGE SUMMARY
Discharge- Rodríguez Hodgson 1955, 77 y o  male MRN: [id number]    Unit/Bed#: -01 Encounter: [id number]    Primary Care Provider: Mariel Yang MD   Date and time admitted to hospital: 12/25/2020 10:47 AM        * Pneumonia due to COVID-19 virus  Assessment & Plan  75-year-old male with past medical history of hypertension, type 2 diabetes and sleep apnea presented from and sent as a transfer and diagnosed with COVID  Diagnosed with COVID on 12/25  Currently on room air  Initiated COVID mild pathway  Continue supportive therapy on discharge  Inflammatory markers are currently flat  No DVTppx with d-dimer < 1  Procal elevated, will d/c with po antibiotics    Thrombocytopenia ([facility] [address] )  Assessment & Plan  As above with anemia    Anemia  Assessment & Plan  Unknown baseline of patient hemoglobin  Likely consistent with thrombocytopenia and long-term cirrhosis found on imaging  Iron panel shows iron deficiency  Given 1 time dose of IV iron, discharged with p o  Iron to take with vitamin-C daily    Alcoholic cirrhosis ([facility] [address] )  Assessment & Plan  CT abdomen shows nodular appearance of liver consistent with cirrhosis  Long-time history of alcohol abuse  Recommend follow outpatient with GI doctor consider MRI for further imaging    Cholelithiasis  Assessment & Plan  CT abdomen pelvis completed, showed cholelithiasis with no obstruction  Currently asymptomatic with no abdominal pain    ABILIO (acute kidney injury) ([facility] [address] )  Assessment & Plan  ABILIO resolved, continue Arb    Benign essential HTN  Assessment & Plan  Resume home blood pressure diltiazem and losartan    Type 2 diabetes mellitus [facility])  Assessment & Plan  Lab Results   Component Value Date    HGBA1C 9 6 (H) 12/26/2020       Recent Labs     12/26/20  1629 12/26/20  2138 12/27/20  0526 12/27/20  1109   POCGLU 182* 215* 155* 188*     Recommending continuing home p o   Medications      Blood Sugar Average: Last 72 hrs:  (P) 128    Alcohol abuse  Assessment & Plan  History of significant alcohol abuse for over 10 years  Reports drinking 5-6 drinks a day  No signs of withdrawal          Discharging Physician / Practitioner: Natasha Chávez MD  PCP: Kylie Jaime MD  Admission Date:   Admission Orders (From admission, onward)     Ordered        12/25/20 1217  Inpatient Admission  Once                   Discharge Date: 12/27/20    Resolved Problems  Date Reviewed: 12/27/2020    None          Consultations During Hospital Stay:  · None    Procedures Performed:   · None    Significant Findings / Test Results:   Xr Chest 1 View Portable    Result Date: 12/25/2020  Impression: No focal consolidation, pleural effusion, or pneumothorax  Workstation performed: [id number]     Pe Study With Ct Abdomen And Pelvis With Contrast    Result Date: 12/25/2020  Impression: The present examination is technically limited for the detection of pulmonary emboli  There is no evidence of large central pulmonary embolism  Evaluation of the segmental and subsegmental branches is markedly limited  There is a peripherally oriented opacity posterolaterally at the right lung base which measures approximately 1 6 cm  Short-term follow-up after treatment to ensure complete radiographic resolution is recommended  There are few mildly prominent right hilar nodes, possibly reactive  Short-term follow-up after treatment to ensure resolution is recommended  Hepatomegaly  The surface contour of the liver is nodular, suggesting cirrhosis  The liver is somewhat heterogeneous  Nonemergent outpatient MRI is recommended for further evaluation, if there are no contraindications  Findings suggesting portal hypertension, including splenomegaly, varices, prominence of the portal vein, etc  There is a small hiatal hernia  The wall of the esophagus appears somewhat thickened  Clinical correlation and follow-up is recommended  The prostate is somewhat prominent  Clinical and laboratory correlation is recommended  Cholelithiasis    No CT evidence of acute cholecystitis  Other findings as described above, please see discussion  This examination demonstrates findings for which imaging follow-up is recommended    Incidental Findings:   · Cholelithiasis    Test Results Pending at Discharge (will require follow up): · None     Outpatient Tests Requested:  · GI follow-up within 1 month  · Follow-up PCP 1-2 weeks    Complications:  None    Reason for Admission:  COVID pneumonia    Hospital Course:     Gisela Roy is a 77 y o  male patient who originally presented to the hospital on 12/25/2020 due to cough  Patient reports ongoing cough for the last month and a half and became frustrated so he went to evaluated by the ED where he was found to be COVID positive  He was transferred from [facility] in [place] to [facility] [place]  Additional imaging done showed that he had cholelithiasis but denies any abdominal pain  CT PE was negative for acute pulmonary embolism but did also show cirrhosis of the liver consistent with his labs where a he was thrombocytopenic and anemic  He does endorse a greater than 10 year history of alcohol abuse of 5-6 drinks a day  Recommend follow-up with a GI doctor outpatient for further workup and evaluation with possible MRI abdomen  During his course he did spike low-grade fevers but did not require any oxygen, his inflammatory markers were trended which were flat indicating that he likely did not progress we get worse  He was discharge with p o  Antibiotics given the fact that he had elevated procalcitonin and continues to have cough concern for likely superimposed bacterial pneumonia  He understands that if his symptoms do continue to get worse, his oxygen need levels drop below 90% or he develops high-grade fevers to return to the ED for further evaluations and workup  Please see above list of diagnoses and related plan for additional information       Condition at Discharge: fair     Discharge Day Visit / Exam: Subjective:  Patient seen today reports doing well and eager to return home  He states that he feels well but is continues to be concerned about his cough  He states he came in for his cough originally and appear being hospitalized for COVID pneumonia  Discussed taking PPI and antihistamine and follow-up PCP  He may need ENT evaluation for his cough  Vitals: Blood Pressure: 118/67 (12/27/20 0534)  Pulse: (!) 113 (12/27/20 0534)  Temperature: 100 °F (37 8 °C) (12/27/20 0534)  Temp Source: Oral (12/27/20 0534)  Respirations: 19 (12/27/20 0534)  Height: 5' 10" (177 8 cm) (12/25/20 1343)  Weight - Scale: 104 kg (230 lb 6 1 oz) (12/27/20 0600)  SpO2: 96 % (12/27/20 0534)  Exam:   Physical Exam  Vitals signs and nursing note reviewed  Constitutional:       Appearance: Normal appearance  He is normal weight  HENT:      Head: Normocephalic and atraumatic  Eyes:      Conjunctiva/sclera: Conjunctivae normal       Pupils: Pupils are equal, round, and reactive to light  Cardiovascular:      Rate and Rhythm: Normal rate and regular rhythm  Heart sounds: Normal heart sounds  Pulmonary:      Breath sounds: Normal breath sounds  No wheezing or rhonchi  Abdominal:      General: Bowel sounds are normal  There is no distension  Palpations: Abdomen is soft  Tenderness: There is no abdominal tenderness  Musculoskeletal: Normal range of motion  General: No swelling  Skin:     General: Skin is warm and dry  Neurological:      General: No focal deficit present  Mental Status: He is alert and oriented to person, place, and time  Mental status is at baseline  Discussion with Family:  Patient    Discharge instructions/Information to patient and family:   See after visit summary for information provided to patient and family  Provisions for Follow-Up Care:  See after visit summary for information related to follow-up care and any pertinent home health orders        Disposition: Home    For Discharges to [facility]:   · Not Applicable to this Patient - Not Applicable to this Patient    Planned Readmission:  None     Discharge Statement:  I spent 35 minutes discharging the patient  This time was spent on the day of discharge  I had direct contact with the patient on the day of discharge  Greater than 50% of the total time was spent examining patient, answering all patient questions, arranging and discussing plan of care with patient as well as directly providing post-discharge instructions  Additional time then spent on discharge activities  Discharge Medications:  See after visit summary for reconciled discharge medications provided to patient and family        ** Please Note: This note has been constructed using a voice recognition system **

## 2020-12-27 NOTE — ASSESSMENT & PLAN NOTE
Lab Results   Component Value Date    HGBA1C 9 6 (H) 12/26/2020       Recent Labs     12/26/20  1629 12/26/20  2138 12/27/20  0526 12/27/20  1109   POCGLU 182* 215* 155* 188*     Recommending continuing home p o   Medications      Blood Sugar Average: Last 72 hrs:  (P) 128

## 2020-12-27 NOTE — ASSESSMENT & PLAN NOTE
History of significant alcohol abuse for over 10 years  Reports drinking 5-6 drinks a day  No signs of withdrawal

## 2020-12-27 NOTE — ASSESSMENT & PLAN NOTE
47-year-old male with past medical history of hypertension, type 2 diabetes and sleep apnea presented from and sent as a transfer and diagnosed with COVID    Diagnosed with COVID on 12/25  Currently on room air  Initiated COVID mild pathway  Continue supportive therapy on discharge  Inflammatory markers are currently flat  No DVTppx with d-dimer < 1  Procal elevated, will d/c with po antibiotics

## 2020-12-27 NOTE — DISCHARGE INSTRUCTIONS
Zinc Sulfate (By mouth)   Zinc Sulfate (gabriella SUL-fate)  Treats problems caused by a lack of zinc in the body  Brand Name(s): Orazinc 110, Orazinc 220, Zinc-220   There may be other brand names for this medicine  When This Medicine Should Not Be Used: You should not use this medicine if you have had an allergic reaction to zinc sulfate  How to Use This Medicine:   Capsule, Tablet, Lozenge  · Your doctor will tell you how much medicine to use  Do not use more than directed  · Follow the instructions on the medicine label if you are using this medicine without a prescription  · It is best to take this medicine on an empty stomach, 1 hour before eating a meal or 2 hours after a meal  You may take this medicine with food if it upsets your stomach  Tell your doctor if you are taking this medicine with food  If a dose is missed:   · Take the missed dose as soon as possible  · Skip the missed dose if it is almost time for your next dose  · Missing one dose is generally not a problem  How to Store and Dispose of This Medicine:   · Store at room temperature in a closed container, away from heat, moisture, and direct light  · Keep all medicine out of the reach of children  Drugs and Foods to Avoid:   Ask your doctor or pharmacist before using any other medicine, including over-the-counter medicines, vitamins, and herbal products  · Make sure your doctor knows if you are taking penicillamine, tetracycline, or iron or copper supplements  · Avoid taking zinc at the same time you eat whole-grain bread or cereal, bran, milk, yogurt, cheese, or ice cream  These foods can keep your body from absorbing enough zinc   Warnings While Using This Medicine:   · If you are pregnant or breastfeeding, talk to your doctor before taking this medicine  Possible Side Effects While Using This Medicine:    If you notice these less serious side effects, talk with your doctor:  · Dizziness  · Upset stomach, vomiting  If you notice other side effects that you think are caused by this medicine, tell your doctor  Call your doctor for medical advice about side effects  You may report side effects to FDA at 7-732-NPK-1580  © Copyright [address] [place] [address] Drive Information is for End User's use only and may not be sold, redistributed or otherwise used for commercial purposes  The above information is an  only  It is not intended as medical advice for individual conditions or treatments  Talk to your doctor, nurse or pharmacist before following any medical regimen to see if it is safe and effective for you  Thiamine (Vitamin B-1) (By mouth)   Thiamine (THYE-a-min)  Thiamine, another name for Vitamin B-1, is used to treat thiamine-deficiency (not enough thiamine in the body)  Brand Name(s): Optimum Vitamin B-1, General Fusion Naturals B1   There may be other brand names for this medicine  When This Medicine Should Not Be Used: You should not use this medicine if you have had an allergic reaction to thiamine (Vitamin B-1)  How to Use This Medicine:   Tablet, Capsule, Liquid  · Your doctor will tell you how much and when to take your medicine  Keep all medicine away from children  · You may take your medicine with or without food  · Measure your oral liquid medicine using a marked measuring spoon or medicine cup  If a dose is missed:   · Take the missed dose as soon as possible  · Skip the missed dose if it is almost time for your next dose  · You should not use two doses at the same time  · Missing a dose is generally not a cause for concern  How to Store and Dispose of This Medicine:   · Store the tablets at room temperature in a closed container  Keep away from heat, moisture, and direct light  · Store the oral liquid at room temperature, away from heat and light  Do not freeze    Drugs and Foods to Avoid:   Ask your doctor or pharmacist before using any other medicine, including over-the-counter medicines, vitamins, and herbal products  · Follow your doctor's orders if he or she has given you a special diet  Warnings While Using This Medicine:   · Tell your doctor if you are pregnant or breastfeeding, or if you become pregnant  Possible Side Effects While Using This Medicine:   Call your doctor right away if you notice any of these side effects:  · Itching or trouble breathing  · Swelling of face, lips or eyelids  If you notice other side effects that you think are caused by this medicine, tell your doctor  Call your doctor for medical advice about side effects  You may report side effects to FDA at 6-736-FTN-6302  © Copyright [address] [place] [address] Information is for End User's use only and may not be sold, redistributed or otherwise used for commercial purposes  The above information is an  only  It is not intended as medical advice for individual conditions or treatments  Talk to your doctor, nurse or pharmacist before following any medical regimen to see if it is safe and effective for you  Ascorbic Acid (Vitamin C) (By mouth)   Ascorbic Acid (as-KORE-madihak AS-id)  Helps patients who do not have enough vitamin C in the body  It is sometimes used to add acidity to the urine  Brand Name(s): C-Time w/Charmaine Hips, Flaquito-C, One-Gram C, Sunkist Vitamin C, Sunmark Chewable C, Vitamin C with Acerola   There may be other brand names for this medicine  When This Medicine Should Not Be Used: You should not use this medicine if you have had an allergic reaction to ascorbic acid (vitamin C)  How to Use This Medicine:   Tablet, Powder, Chewable Tablet, Long Acting Tablet, Long Acting Capsule, Liquid  · Your doctor will tell you how much to take and how often  Always follow the dose instructions on the label if you take this medicine in over-the-counter form  · May be taken with or without food  · Swallow the tablet or capsule whole, do not chew or crush    · Chew the chewable tablet and swallow  · Carefully measure the oral liquid using a marked measuring spoon or medicine cup, or with the dropper that comes with the medicine  This medicine may either be mixed with foods such as cereal and fruit juice, or be dropped into the mouth  If a dose is missed:   · Try not to miss any doses; however, it is usually not a problem if you miss one or two doses  How to Store and Dispose of This Medicine:   · Store at room temperature in a closed container away from heat, moisture, and light  Do not freeze  · Keep all medicine away from children  Drugs and Foods to Avoid:   Ask your doctor or pharmacist before using any other medicine, including over-the-counter medicines, vitamins, and herbal products  · Make sure your doctor knows if you are taking a blood thinner, such as Coumadin®  Warnings While Using This Medicine:   · Check with your doctor before taking vitamin C if you are pregnant, breastfeeding, or have any medical problems  · Ascorbic acid may interfere with tests that measure sugar in the urine of patients with diabetes  Possible Side Effects While Using This Medicine: If you notice these less serious side effects, talk with your doctor:  · Nausea and vomiting, stomach pain, diarrhea  If you notice other side effects that you think are caused by this medicine, tell your doctor  Call your doctor for medical advice about side effects  You may report side effects to FDA at 1-403-FDA-3849  © Copyright [address] Information is for End User's use only and may not be sold, redistributed or otherwise used for commercial purposes  The above information is an  only  It is not intended as medical advice for individual conditions or treatments  Talk to your doctor, nurse or pharmacist before following any medical regimen to see if it is safe and effective for you  Cefadroxil (By mouth)   Cefadroxil (wen-l-NLNT-il)  Treats bacterial infections   This medicine is a cephalosporin antibiotic  Brand Name(s):   There may be other brand names for this medicine  When This Medicine Should Not Be Used: You should not use this medicine if you have had an allergic reaction to any cephalosporin medicine such a Keflex®, Ceclor®, Velosef®, or Suprax®  How to Use This Medicine:   Tablet, Capsule, Liquid  · Your doctor will tell you how much to take and how often  · You may take your medicine with food to avoid an upset stomach  · Shake the oral liquid well each time you use it  · Carefully measure the oral liquid dose with a measuring spoon or medicine cup  · Keep taking your medicine until your doctor tells you to stop, even if you feel better  If you stop taking the medicine too soon, your infection may come back  If a dose is missed:   · Take the missed dose as soon as possible  · If it is almost time for your next regular dose, wait until then to take the medicine and skip the missed dose  · You should not use two doses at the same time  How to Store and Dispose of This Medicine:   · Store tablets and capsules at room temperature away from heat, moisture, and light  · You may keep the oral liquid in the refrigerator for 14 days  After that, throw away any unused liquid  Do not freeze  · Keep all medicine out of the reach of children  Drugs and Foods to Avoid:   Ask your doctor or pharmacist before using any other medicine, including over-the-counter medicines, vitamins, and herbal products  · Make sure your doctor knows if you are taking birth control pills or probenecid (Benemid® or ColBENEMID®) while taking cefadroxil  Warnings While Using This Medicine:   · Before taking this medicine, let your doctor know if you have ever had an allergic reaction to penicillin or if you have kidney disease  · If you have severe diarrhea while taking this medicine, check with your doctor before taking medicine to stop the diarrhea    · Cefadroxil may cause incorrect results with some tests used by patients with diabetes to check for sugar in urine  · If you are pregnant or breastfeeding, talk to your doctor before taking this medicine  Possible Side Effects While Using This Medicine:   Call your doctor right away if you notice any of these side effects:  · Rash or hives  · Swelling of the face, throat, or lips  · Wheezing or trouble breathing  · Severe diarrhea (watery or bloody)  · Severe vomiting or stomach pain  · Blistering or peeling skin  If you notice these less serious side effects, talk with your doctor:   · Mild diarrhea  · Nausea  · Sore mouth or tongue  · Vaginal itching or discharge  · Joint aches and pains  If you notice other side effects that you think are caused by this medicine, tell your doctor  Call your doctor for medical advice about side effects  You may report side effects to FDA at 9-065-FDA-4606  © Copyright [address] [place] [address] Drive Information is for End User's use only and may not be sold, redistributed or otherwise used for commercial purposes  The above information is an  only  It is not intended as medical advice for individual conditions or treatments  Talk to your doctor, nurse or pharmacist before following any medical regimen to see if it is safe and effective for you  Cholecalciferol (By mouth)   Cholecalciferol (ypq-cr-wxp-SIF-er-ol)  Treats vitamin D deficiency and maintains bone strength  This is a dietary supplement  Brand Name(s): Basic's Natural Vitamin D-3, Bárbara Codding Ddrjake, Kirstie Wang for [employer], D-3, D3-5, D3-50, Decara, Delta D3, Dialyvite Vitamin D3 Max, Rey Natural , Maximum D3, Nature's Blend Super Strength Vitamin D3, Thera-D 2000, Thera-D 4000   There may be other brand names for this medicine  When This Medicine Should Not Be Used: This medicine is generally considered safe for most people  Talk to your doctor if you have concerns    How to Use This Medicine:   Capsule, Liquid Filled Capsule, Liquid, Tablet, Chewable Tablet, Wafer, Drop  · Your doctor will tell you how much medicine to use  Do not use more than directed  · Follow the instructions on the medicine label if you are using this medicine without a prescription  · Oral liquid: Use the dropper that comes with the package to measure the dose  ? Adults and adolescents: Drop the liquid directly into the mouth or mix it with food or other liquids (water or juice)  ? Children 3years of age and older: Drop the liquid directly into the mouth, mix it with food or other liquids (water or juice), or take it from a spoon  ? Children younger than 3years of age: Place one drop of the liquid on the pacifier, mother's nipple, or bottle nipple and allow baby to suck for at least 30 seconds  · Wafer: Chew or crush  Do not swallow whole  · Missed dose: Take a dose as soon as you remember  If it is almost time for your next dose, wait until then and take a regular dose  Do not take extra medicine to make up for a missed dose  · Store the medicine in a closed container at room temperature, away from heat, moisture, and direct light  Drugs and Foods to Avoid:      Ask your doctor or pharmacist before using any other medicine, including over-the-counter medicines, vitamins, and herbal products  Warnings While Using This Medicine:   · Tell your doctor if you are pregnant or breastfeeding  · Your doctor will check your progress and the effects of this medicine at regular visits  Keep all appointments  · Keep all medicine out of the reach of children  Never share your medicine with anyone    Possible Side Effects While Using This Medicine:   Call your doctor right away if you notice any of these side effects:  · Allergic reaction: Itching or hives, swelling in your face or hands, swelling or tingling in your mouth or throat, chest tightness, trouble breathing  If you notice other side effects that you think are caused by this medicine, tell your doctor  Call your doctor for medical advice about side effects  You may report side effects to FDA at 4-146-FDA-1713  © Copyright [address] Information is for End User's use only and may not be sold, redistributed or otherwise used for commercial purposes  The above information is an  only  It is not intended as medical advice for individual conditions or treatments  Talk to your doctor, nurse or pharmacist before following any medical regimen to see if it is safe and effective for you  Folic Acid (By mouth)   Folic Acid (FOE-lik AS-id)  Treats certain types of anemia (not enough red blood cells)  Is also given as a supplement to women who are pregnant or planning on getting pregnant  Folic acid is a B vitamin  Brand Name(s): FA-8, Folacin-800, Methylfolate, Nature's Blend Folic Acid, Optimum Folic Acid, PharmAssure Folic Acid, Sunmark Folic Acid   There may be other brand names for this medicine  When This Medicine Should Not Be Used: You should not use this medicine if you have ever had an allergic reaction to folic acid  How to Use This Medicine:   Capsule, Tablet  · Your doctor will tell you how much to take and how often  · Keep taking this medicine for as long as your doctor tells you to, even if you feel better  If a dose is missed:   · Take the missed dose as soon as possible  · If it is almost time for your next regular dose, wait until then to take your medicine and skip the missed dose  · You should not use two doses at the same time  How to Store and Dispose of This Medicine:   · Store at room temperature  Protect from heat, direct light, and moisture  · Ask your pharmacist, doctor, or health caregiver about the best way to dispose of any outdated medicine or medicine no longer needed  · Keep all medicine out of the reach of children    Drugs and Foods to Avoid:   Ask your doctor or pharmacist before using any other medicine, including over-the-counter medicines, vitamins, and herbal products  · Make sure your doctor knows if you are taking Dilantin®  · If you are taking Questran® or Colestid®, take your folic acid dose 1 hour before or at least 4 hours after the other medicine  · Your doctor may ask you to eat more foods that contain folic acid  Good sources of folic acid are fruits, green leafy vegetables, liver, kidney, and breads made with dried yeast   Warnings While Using This Medicine:   · If you are pregnant or breastfeeding, tell your doctor  It is very important during this time to take the correct vitamins in the right amounts  Possible Side Effects While Using This Medicine:   Call your doctor right away if you notice any of these side effects:  · Skin rash, itching, and redness  If you notice these less serious side effects, talk with your doctor:   · Nausea, bloating, gas  · Bitter taste in the mouth  · Irritability  · Trouble sleeping  If you notice other side effects that you think are caused by this medicine, tell your doctor  Call your doctor for medical advice about side effects  You may report side effects to FDA at 9-626-FDA-6399  © Copyright [address] [place] [address] Drive Information is for End User's use only and may not be sold, redistributed or otherwise used for commercial purposes  The above information is an  only  It is not intended as medical advice for individual conditions or treatments  Talk to your doctor, nurse or pharmacist before following any medical regimen to see if it is safe and effective for you  Loratadine (By mouth)   Loratadine (atn-U-ei-minda)  Treats allergy symptoms and hives     Brand Name(s): Allergy Relief, Brite-Life Allergy Relief, Children's Allergy Relief, Children's Claritin, Children's Claritin Allergy, Children's Claritin Chewables, Children's Loratadine Allergy, Claritin, Claritin 24HR, Claritin Liqui-Gels, Claritin RediTabs, Good Neighbor Loratadine, Good [facility]ighbor [facility] Loratadine, Good Sense Allergy Relief, Good Sense Children's Loratadine   There may be other brand names for this medicine  When This Medicine Should Not Be Used: You should not use this medicine if you have had an allergic reaction to loratadine  Do not give any over-the-counter (OTC) cough and cold medicine to a baby or child under 3years old  Using these medicines in very young children might cause serious or possibly life-threatening side effects  How to Use This Medicine:   Tablet, Dissolving Tablet, Liquid, Liquid Filled Capsule, Chewable Tablet  · Your doctor will tell you how much of this medicine to use and how often  Do not use more medicine or use it more often than your doctor tells you to  · Follow the instructions on the medicine label if you are using this medicine without a prescription  · After you remove a rapidly disintegrating tablet (Reditab®) from the package, use it right away by putting the tablet on your tongue  The tablet will break up and dissolve quickly  You may take the tablet with or without water  · Measure the oral liquid medicine with a marked measuring spoon, oral syringe, or medicine cup  If a dose is missed:   · If you miss a dose or forget to take your medicine, take it as soon as you can  If it is almost time for your next dose, wait until then to take the medicine and skip the missed dose  · Do not use extra medicine to make up for a missed dose  How to Store and Dispose of This Medicine:   · Store the medicine at room temperature, away from heat and direct light  Do not freeze  · Ask your pharmacist, doctor, or health caregiver about the best way to dispose of any outdated medicine or medicine no longer needed  · Keep all medicine out of the reach of children and never share your medicine with anyone  Drugs and Foods to Avoid:      Ask your doctor or pharmacist before using any other medicine, including over-the-counter medicines, vitamins, and herbal products  Warnings While Using This Medicine:   · Make sure your doctor knows if you are pregnant or breastfeeding, or if you have liver disease or kidney disease  Possible Side Effects While Using This Medicine:   Call your doctor right away if you notice any of these side effects:  · Allergic reaction: Itching or hives, swelling in face or hands, swelling or tingling in the mouth or throat, tightness in chest, trouble breathing  · Extreme weakness  · Fast or irregular heartbeat  · Uncontrolled movements of the head, neck, eyes or tongue  If you notice these less serious side effects, talk with your doctor:   · Cough, sore throat, hoarseness  · Drowsiness  · Dry mouth  · Headache  · Nervousness  · Red, irritated eyes  If you notice other side effects that you think are caused by this medicine, tell your doctor  Call your doctor for medical advice about side effects  You may report side effects to FDA at 9-931-FDA-6928  © Copyright [address] [place] [address] Information is for End User's use only and may not be sold, redistributed or otherwise used for commercial purposes  The above information is an  only  It is not intended as medical advice for individual conditions or treatments  Talk to your doctor, nurse or pharmacist before following any medical regimen to see if it is safe and effective for you  Pantoprazole (By mouth)   Pantoprazole (pan-TOE-pra-zole)  Treats gastroesophageal reflux disease (GERD), a damaged esophagus, and conditions that cause your stomach to make too much acid, including Zollinger-Lynne syndrome  This medicine is a proton pump inhibitor (PPI)  Brand Name(s): Protonix   There may be other brand names for this medicine  When This Medicine Should Not Be Used: This medicine is not right for everyone  Do not use it if you had an allergic reaction to pantoprazole or similar medicines    How to Use This Medicine:   Packet, Tablet, Delayed Release Tablet, Long Acting Tablet  · Your doctor will tell you how much medicine to use  Do not use more than directed  · Delayed-release tablet: Swallow the tablet whole  Do not crush, break, or chew it  · Delayed-release packet: Take the medicine mixed in apple juice or applesauce at least 30 minutes before a meal  It may also be given using a nasogastric tube when mixed in apple juice only  ? To prepare with applesauce:   § Mix the packet contents with 1 teaspoon of applesauce  Do not mix with water or other liquids or food  Do not divide the packet contents to make smaller doses  § Swallow the mixture within 10 minutes after you mix it  Do not chew or crush the granules  § Sip some water after you take the mixture to make sure you swallow all of the medicine  ? To prepare with apple juice:   § Mix the packet contents with 1 teaspoon of apple juice in a small cup  Do not divide the packet contents to make smaller doses  § Stir for 5 seconds and drink the mixture immediately  Do not chew or crush the granules  § To make sure you get all of the medicine, add more apple juice to the cup  Drink it immediately  ? To prepare for a feeding tube:   § Pour the packet contents in a 2-ounce (60 milliliter [mL]) catheter-tip syringe  § Add 10 mL of apple juice to the syringe  Add the mixture to the tube  Gently tap or shake the barrel of the syringe to help empty it  § Add 10 mL of apple juice to the syringe and put it in the tube  Do this at least 2 times  There should be no granules left in the syringe  · This medicine should come with a Medication Guide  Ask your pharmacist for a copy if you do not have one  · Missed dose: Take a dose as soon as you remember  If it is almost time for your next dose, wait until then and take a regular dose  Do not take extra medicine to make up for a missed dose  · Store the medicine in a closed container at room temperature, away from heat, moisture, and direct light    Drugs and Foods to Avoid:   Ask your doctor or pharmacist before using any other medicine, including over-the-counter medicines, vitamins, and herbal products  · Do not use pantoprazole together with medicine that contains rilpivirine  · Some medicines can affect how pantoprazole works  Tell your doctor if you are using any of the following:   ? Ampicillin, atazanavir, dasatinib, digoxin, erlotinib, itraconazole, ketoconazole, methotrexate, mycophenolate mofetil, nelfinavir, nilotinib, saquinavir  ? Blood thinner (including warfarin)  ? Diuretic (water pill)  ? Iron supplements  Warnings While Using This Medicine:   · Tell your doctor if you are pregnant or breastfeeding, or if you have kidney disease, liver disease, lupus, or osteoporosis  · This medicine may cause the following problems:  ? Kidney problems  ? Increased risk of broken bones in the hip, wrist, or spine (more likely if used several times per day or longer than 1 year)  ? Lupus  ? Fundic gland polyps (abnormal growth in the upper part of your stomach)  · This medicine can cause diarrhea  Call your doctor if the diarrhea becomes severe, does not stop, or is bloody  Do not take any medicine to stop diarrhea until you have talked to your doctor  Diarrhea can occur 2 months or more after you stop taking this medicine  · Tell any doctor or dentist who treats you that you are using this medicine  This medicine may affect certain medical test results  · Your doctor will do lab tests at regular visits to check on the effects of this medicine  Keep all appointments  · Keep all medicine out of the reach of children  Never share your medicine with anyone    Possible Side Effects While Using This Medicine:   Call your doctor right away if you notice any of these side effects:  · Allergic reaction: Itching or hives, swelling in your face or hands, swelling or tingling in your mouth or throat, chest tightness, trouble breathing  · Blistering, peeling, red skin rash  · Fever, joint pain, swelling in your body, unusual weight gain, change in how much or how often you urinate  · Joint pain, rash on your cheeks or arms that gets worse in the sun  · Seizures, dizziness, uneven heartbeat, muscle cramps or twitching  · Severe diarrhea, stomach cramp or pain, nausea, vomiting, weight loss  If you notice these less serious side effects, talk with your doctor:   · Headache  If you notice other side effects that you think are caused by this medicine, tell your doctor  Call your doctor for medical advice about side effects  You may report side effects to FDA at 4-546-HER-5270  © Copyright [address] [place] [address] Drive Information is for End User's use only and may not be sold, redistributed or otherwise used for commercial purposes  The above information is an  only  It is not intended as medical advice for individual conditions or treatments  Talk to your doctor, nurse or pharmacist before following any medical regimen to see if it is safe and effective for you

## 2020-12-27 NOTE — PLAN OF CARE
Problem: Potential for Falls  Goal: Patient will remain free of falls  Description: INTERVENTIONS:  - Assess patient frequently for physical needs  -  Identify cognitive and physical deficits and behaviors that affect risk of falls  -  [place] fall precautions as indicated by assessment   - Educate patient/family on patient safety including physical limitations  - Instruct patient to call for assistance with activity based on assessment  - Modify environment to reduce risk of injury  - Consider OT/PT consult to assist with strengthening/mobility  Outcome: Progressing     Problem: INFECTION - ADULT  Goal: Absence or prevention of progression during hospitalization  Description: INTERVENTIONS:  - Assess and monitor for signs and symptoms of infection  - Monitor lab/diagnostic results  - Monitor all insertion sites, i e  indwelling lines, tubes, and drains  - Monitor endotracheal if appropriate and nasal secretions for changes in amount and color  - [place] appropriate cooling/warming therapies per order  - Administer medications as ordered  - Instruct and encourage patient and family to use good hand hygiene technique  - Identify and instruct in appropriate isolation precautions for identified infection/condition  Outcome: Progressing  Goal: Absence of fever/infection during neutropenic period  Description: INTERVENTIONS:  - Monitor WBC    Outcome: Progressing     Problem: SAFETY ADULT  Goal: Patient will remain free of falls  Description: INTERVENTIONS:  - Assess patient frequently for physical needs  -  Identify cognitive and physical deficits and behaviors that affect risk of falls    -  [place] fall precautions as indicated by assessment   - Educate patient/family on patient safety including physical limitations  - Instruct patient to call for assistance with activity based on assessment  - Modify environment to reduce risk of injury  - Consider OT/PT consult to assist with strengthening/mobility  Outcome: Progressing  Goal: Maintain or return to baseline ADL function  Description: INTERVENTIONS:  -  Assess patient's ability to carry out ADLs; assess patient's baseline for ADL function and identify physical deficits which impact ability to perform ADLs (bathing, care of mouth/teeth, toileting, grooming, dressing, etc )  - Assess/evaluate cause of self-care deficits   - Assess range of motion  - Assess patient's mobility; develop plan if impaired  - Assess patient's need for assistive devices and provide as appropriate  - Encourage maximum independence but intervene and supervise when necessary  - Involve family in performance of ADLs  - Assess for home care needs following discharge   - Consider OT consult to assist with ADL evaluation and planning for discharge  - Provide patient education as appropriate  Outcome: Progressing  Goal: Maintain or return mobility status to optimal level  Description: INTERVENTIONS:  - Assess patient's baseline mobility status (ambulation, transfers, stairs, etc )    - Identify cognitive and physical deficits and behaviors that affect mobility  - Identify mobility aids required to assist with transfers and/or ambulation (gait belt, sit-to-stand, lift, walker, cane, etc )  - [place] fall precautions as indicated by assessment  - Record patient progress and toleration of activity level on Mobility SBAR; progress patient to next Phase/Stage  - Instruct patient to call for assistance with activity based on assessment  - Consider rehabilitation consult to assist with strengthening/weightbearing, etc   Outcome: Progressing     Problem: DISCHARGE PLANNING  Goal: Discharge to home or other facility with appropriate resources  Description: INTERVENTIONS:  - Identify barriers to discharge w/patient and caregiver  - Arrange for needed discharge resources and transportation as appropriate  - Identify discharge learning needs (meds, wound care, etc )  - Arrange for interpretive services to assist at discharge as needed  - Refer to Case Management Department for coordinating discharge planning if the patient needs post-hospital services based on physician/advanced practitioner order or complex needs related to functional status, cognitive ability, or social support system  Outcome: Progressing     Problem: Knowledge Deficit  Goal: Patient/family/caregiver demonstrates understanding of disease process, treatment plan, medications, and discharge instructions  Description: Complete learning assessment and assess knowledge base    Interventions:  - Provide teaching at level of understanding  - Provide teaching via preferred learning methods  Outcome: Progressing     Problem: NEUROSENSORY - ADULT  Goal: Achieves stable or improved neurological status  Description: INTERVENTIONS  - Monitor and report changes in neurological status  - Monitor vital signs such as temperature, blood pressure, glucose, and any other labs ordered   - Initiate measures to prevent increased intracranial pressure  - Monitor for seizure activity and implement precautions if appropriate      Outcome: Progressing     Problem: RESPIRATORY - ADULT  Goal: Achieves optimal ventilation and oxygenation  Description: INTERVENTIONS:  - Assess for changes in respiratory status  - Assess for changes in mentation and behavior  - Position to facilitate oxygenation and minimize respiratory effort  - Oxygen administered by appropriate delivery if ordered  - Initiate smoking cessation education as indicated  - Encourage broncho-pulmonary hygiene including cough, deep breathe, Incentive Spirometry  - Assess the need for suctioning and aspirate as needed  - Assess and instruct to report SOB or any respiratory difficulty  - Respiratory Therapy support as indicated  Outcome: Progressing     Problem: GASTROINTESTINAL - ADULT  Goal: Maintains or returns to baseline bowel function  Description: INTERVENTIONS:  - Assess bowel function  - Encourage oral fluids to ensure adequate hydration  - Administer IV fluids if ordered to ensure adequate hydration  - Administer ordered medications as needed  - Encourage mobilization and activity  - Consider nutritional services referral to assist patient with adequate nutrition and appropriate food choices  Outcome: Progressing  Goal: Maintains adequate nutritional intake  Description: INTERVENTIONS:  - Monitor percentage of each meal consumed  - Identify factors contributing to decreased intake, treat as appropriate  - Assist with meals as needed  - Monitor I&O, weight, and lab values if indicated  - Obtain nutrition services referral as needed  Outcome: Progressing     Problem: GENITOURINARY - ADULT  Goal: Maintains or returns to baseline urinary function  Description: INTERVENTIONS:  - Assess urinary function  - Encourage oral fluids to ensure adequate hydration if ordered  - Administer IV fluids as ordered to ensure adequate hydration  - Administer ordered medications as needed  - Offer frequent toileting  - Follow urinary retention protocol if ordered  Outcome: Progressing     Problem: METABOLIC, FLUID AND ELECTROLYTES - ADULT  Goal: Electrolytes maintained within normal limits  Description: INTERVENTIONS:  - Monitor labs and assess patient for signs and symptoms of electrolyte imbalances  - Administer electrolyte replacement as ordered  - Monitor response to electrolyte replacements, including repeat lab results as appropriate  - Instruct patient on fluid and nutrition as appropriate  Outcome: Progressing  Goal: Glucose maintained within target range  Description: INTERVENTIONS:  - Monitor Blood Glucose as ordered  - Assess for signs and symptoms of hyperglycemia and hypoglycemia  - Administer ordered medications to maintain glucose within target range  - Assess nutritional intake and initiate nutrition service referral as needed  Outcome: Progressing     Problem: SKIN/TISSUE INTEGRITY - ADULT  Goal: Skin integrity remains intact  Description: INTERVENTIONS  - Identify patients at risk for skin breakdown  - Assess and monitor skin integrity  - Assess and monitor nutrition and hydration status  - Monitor labs (i e  albumin)  - Assess for incontinence   - Turn and reposition patient  - Assist with mobility/ambulation  - Relieve pressure over bony prominences  - Avoid friction and shearing  - Provide appropriate hygiene as needed including keeping skin clean and dry  - Evaluate need for skin moisturizer/barrier cream  - Collaborate with interdisciplinary team (i e  Nutrition, Rehabilitation, etc )   - Patient/family teaching  Outcome: Progressing  Goal: Incision(s), wounds(s) or drain site(s) healing without S/S of infection  Description: INTERVENTIONS  - Assess and document risk factors for skin impairment   - Assess and document dressing, incision, wound bed, drain sites and surrounding tissue  - Consider nutrition services referral as needed  - Oral mucous membranes remain intact  - Provide patient/ family education  Outcome: Progressing  Goal: Oral mucous membranes remain intact  Description: INTERVENTIONS  - Assess oral mucosa and hygiene practices  - Implement preventative oral hygiene regimen  - Implement oral medicated treatments as ordered  - Initiate Nutrition services referral as needed  Outcome: Progressing     Problem: MUSCULOSKELETAL - ADULT  Goal: Maintain or return mobility to safest level of function  Description: INTERVENTIONS:  - Assess patient's ability to carry out ADLs; assess patient's baseline for ADL function and identify physical deficits which impact ability to perform ADLs (bathing, care of mouth/teeth, toileting, grooming, dressing, etc )  - Assess/evaluate cause of self-care deficits   - Assess range of motion  - Assess patient's mobility  - Assess patient's need for assistive devices and provide as appropriate  - Encourage maximum independence but intervene and supervise when necessary  - Involve family in performance of ADLs  - Assess for home care needs following discharge   - Consider OT consult to assist with ADL evaluation and planning for discharge  - Provide patient education as appropriate  Outcome: Progressing  Goal: Maintain proper alignment of affected body part  Description: INTERVENTIONS:  - Support, maintain and protect limb and body alignment  - Provide patient/ family with appropriate education  Outcome: Progressing

## 2020-12-29 ENCOUNTER — APPOINTMENT (EMERGENCY)
Dept: RADIOLOGY | Facility: HOSPITAL | Age: 65
DRG: 177 | End: 2020-12-29
Payer: COMMERCIAL

## 2020-12-29 ENCOUNTER — HOSPITAL ENCOUNTER (INPATIENT)
Facility: HOSPITAL | Age: 65
LOS: 4 days | Discharge: HOME | DRG: 177 | End: 2021-01-03
Attending: EMERGENCY MEDICINE | Admitting: STUDENT IN AN ORGANIZED HEALTH CARE EDUCATION/TRAINING PROGRAM
Payer: COMMERCIAL

## 2020-12-29 DIAGNOSIS — K70.30 ALCOHOLIC CIRRHOSIS OF LIVER WITHOUT ASCITES (HCC): ICD-10-CM

## 2020-12-29 DIAGNOSIS — J12.82 PNEUMONIA DUE TO COVID-19 VIRUS: Primary | ICD-10-CM

## 2020-12-29 DIAGNOSIS — U07.1 PNEUMONIA DUE TO COVID-19 VIRUS: Primary | ICD-10-CM

## 2020-12-29 PROBLEM — R07.89 CHEST PRESSURE: Status: ACTIVE | Noted: 2020-12-29

## 2020-12-29 PROBLEM — G47.00 INSOMNIA: Status: ACTIVE | Noted: 2020-12-29

## 2020-12-29 PROBLEM — R77.8 ELEVATED TROPONIN: Status: ACTIVE | Noted: 2020-12-29

## 2020-12-29 PROBLEM — R79.89 ELEVATED TROPONIN: Status: ACTIVE | Noted: 2020-12-29

## 2020-12-29 LAB
ALBUMIN SERPL BCP-MCNC: 2.3 G/DL (ref 3.5–5)
ALBUMIN SERPL BCP-MCNC: 2.5 G/DL (ref 3.5–5)
ALP SERPL-CCNC: 201 U/L (ref 46–116)
ALP SERPL-CCNC: 227 U/L (ref 46–116)
ALT SERPL W P-5'-P-CCNC: 71 U/L (ref 12–78)
ALT SERPL W P-5'-P-CCNC: 75 U/L (ref 12–78)
ANION GAP SERPL CALCULATED.3IONS-SCNC: 10 MMOL/L (ref 4–13)
ANION GAP SERPL CALCULATED.3IONS-SCNC: 8 MMOL/L (ref 4–13)
APTT PPP: 46 SECONDS (ref 23–37)
AST SERPL W P-5'-P-CCNC: 172 U/L (ref 5–45)
AST SERPL W P-5'-P-CCNC: 201 U/L (ref 5–45)
ATRIAL RATE: 103 BPM
BASOPHILS # BLD AUTO: 0.01 THOUSANDS/ΜL (ref 0–0.1)
BASOPHILS NFR BLD AUTO: 0 % (ref 0–1)
BILIRUB SERPL-MCNC: 1.1 MG/DL (ref 0.2–1)
BILIRUB SERPL-MCNC: 1.2 MG/DL (ref 0.2–1)
BUN SERPL-MCNC: 13 MG/DL (ref 5–25)
BUN SERPL-MCNC: 13 MG/DL (ref 5–25)
CALCIUM ALBUM COR SERPL-MCNC: 9.2 MG/DL (ref 8.3–10.1)
CALCIUM ALBUM COR SERPL-MCNC: 9.4 MG/DL (ref 8.3–10.1)
CALCIUM SERPL-MCNC: 7.8 MG/DL (ref 8.3–10.1)
CALCIUM SERPL-MCNC: 8.2 MG/DL (ref 8.3–10.1)
CHLORIDE SERPL-SCNC: 101 MMOL/L (ref 100–108)
CHLORIDE SERPL-SCNC: 102 MMOL/L (ref 100–108)
CO2 SERPL-SCNC: 23 MMOL/L (ref 21–32)
CO2 SERPL-SCNC: 24 MMOL/L (ref 21–32)
CREAT SERPL-MCNC: 1.12 MG/DL (ref 0.6–1.3)
CREAT SERPL-MCNC: 1.19 MG/DL (ref 0.6–1.3)
EOSINOPHIL # BLD AUTO: 0.01 THOUSAND/ΜL (ref 0–0.61)
EOSINOPHIL NFR BLD AUTO: 0 % (ref 0–6)
ERYTHROCYTE [DISTWIDTH] IN BLOOD BY AUTOMATED COUNT: 21.2 % (ref 11.6–15.1)
FIBRINOGEN PPP-MCNC: 338 MG/DL (ref 227–495)
GFR SERPL CREATININE-BSD FRML MDRD: 63 ML/MIN/1.73SQ M
GFR SERPL CREATININE-BSD FRML MDRD: 68 ML/MIN/1.73SQ M
GLUCOSE P FAST SERPL-MCNC: 113 MG/DL (ref 65–99)
GLUCOSE SERPL-MCNC: 102 MG/DL (ref 65–140)
GLUCOSE SERPL-MCNC: 113 MG/DL (ref 65–140)
GLUCOSE SERPL-MCNC: 127 MG/DL (ref 65–140)
GLUCOSE SERPL-MCNC: 45 MG/DL (ref 65–140)
GLUCOSE SERPL-MCNC: 59 MG/DL (ref 65–140)
GLUCOSE SERPL-MCNC: 70 MG/DL (ref 65–140)
GLUCOSE SERPL-MCNC: 93 MG/DL (ref 65–140)
HCT VFR BLD AUTO: 28.7 % (ref 36.5–49.3)
HGB BLD-MCNC: 8.2 G/DL (ref 12–17)
IMM GRANULOCYTES # BLD AUTO: 0.06 THOUSAND/UL (ref 0–0.2)
IMM GRANULOCYTES NFR BLD AUTO: 1 % (ref 0–2)
INR PPP: 1.29 (ref 0.84–1.19)
LYMPHOCYTES # BLD AUTO: 0.71 THOUSANDS/ΜL (ref 0.6–4.47)
LYMPHOCYTES NFR BLD AUTO: 12 % (ref 14–44)
MAGNESIUM SERPL-MCNC: 2 MG/DL (ref 1.6–2.6)
MCH RBC QN AUTO: 20.1 PG (ref 26.8–34.3)
MCHC RBC AUTO-ENTMCNC: 28.6 G/DL (ref 31.4–37.4)
MCV RBC AUTO: 71 FL (ref 82–98)
MONOCYTES # BLD AUTO: 0.66 THOUSAND/ΜL (ref 0.17–1.22)
MONOCYTES NFR BLD AUTO: 11 % (ref 4–12)
NEUTROPHILS # BLD AUTO: 4.63 THOUSANDS/ΜL (ref 1.85–7.62)
NEUTS SEG NFR BLD AUTO: 76 % (ref 43–75)
NRBC BLD AUTO-RTO: 3 /100 WBCS
P AXIS: 74 DEGREES
PLATELET # BLD AUTO: 84 THOUSANDS/UL (ref 149–390)
PLATELET # BLD AUTO: 94 THOUSANDS/UL (ref 149–390)
POTASSIUM SERPL-SCNC: 3.5 MMOL/L (ref 3.5–5.3)
POTASSIUM SERPL-SCNC: 4.3 MMOL/L (ref 3.5–5.3)
PR INTERVAL: 156 MS
PROCALCITONIN SERPL-MCNC: 1.97 NG/ML
PROT SERPL-MCNC: 6.1 G/DL (ref 6.4–8.2)
PROT SERPL-MCNC: 6.9 G/DL (ref 6.4–8.2)
PROTHROMBIN TIME: 15.5 SECONDS (ref 11.6–14.5)
QRS AXIS: 39 DEGREES
QRSD INTERVAL: 78 MS
QT INTERVAL: 388 MS
QTC INTERVAL: 508 MS
RBC # BLD AUTO: 4.07 MILLION/UL (ref 3.88–5.62)
SODIUM SERPL-SCNC: 134 MMOL/L (ref 136–145)
SODIUM SERPL-SCNC: 134 MMOL/L (ref 136–145)
T WAVE AXIS: -17 DEGREES
TROPONIN I SERPL-MCNC: 0.04 NG/ML
TROPONIN I SERPL-MCNC: 0.04 NG/ML
TROPONIN I SERPL-MCNC: 0.05 NG/ML
TROPONIN I SERPL-MCNC: 0.05 NG/ML
VENTRICULAR RATE: 103 BPM
WBC # BLD AUTO: 6.08 THOUSAND/UL (ref 4.31–10.16)

## 2020-12-29 PROCEDURE — 85049 AUTOMATED PLATELET COUNT: CPT | Performed by: STUDENT IN AN ORGANIZED HEALTH CARE EDUCATION/TRAINING PROGRAM

## 2020-12-29 PROCEDURE — 82948 REAGENT STRIP/BLOOD GLUCOSE: CPT

## 2020-12-29 PROCEDURE — 85611 PROTHROMBIN TEST: CPT | Performed by: STUDENT IN AN ORGANIZED HEALTH CARE EDUCATION/TRAINING PROGRAM

## 2020-12-29 PROCEDURE — 93010 ELECTROCARDIOGRAM REPORT: CPT | Performed by: INTERNAL MEDICINE

## 2020-12-29 PROCEDURE — 85025 COMPLETE CBC W/AUTO DIFF WBC: CPT | Performed by: EMERGENCY MEDICINE

## 2020-12-29 PROCEDURE — 85384 FIBRINOGEN ACTIVITY: CPT | Performed by: STUDENT IN AN ORGANIZED HEALTH CARE EDUCATION/TRAINING PROGRAM

## 2020-12-29 PROCEDURE — 93005 ELECTROCARDIOGRAM TRACING: CPT

## 2020-12-29 PROCEDURE — 80053 COMPREHEN METABOLIC PANEL: CPT | Performed by: STUDENT IN AN ORGANIZED HEALTH CARE EDUCATION/TRAINING PROGRAM

## 2020-12-29 PROCEDURE — 94664 DEMO&/EVAL PT USE INHALER: CPT

## 2020-12-29 PROCEDURE — XW033E5 INTRODUCTION OF REMDESIVIR ANTI-INFECTIVE INTO PERIPHERAL VEIN, PERCUTANEOUS APPROACH, NEW TECHNOLOGY GROUP 5: ICD-10-PCS | Performed by: STUDENT IN AN ORGANIZED HEALTH CARE EDUCATION/TRAINING PROGRAM

## 2020-12-29 PROCEDURE — 99285 EMERGENCY DEPT VISIT HI MDM: CPT

## 2020-12-29 PROCEDURE — 71045 X-RAY EXAM CHEST 1 VIEW: CPT

## 2020-12-29 PROCEDURE — 84484 ASSAY OF TROPONIN QUANT: CPT | Performed by: STUDENT IN AN ORGANIZED HEALTH CARE EDUCATION/TRAINING PROGRAM

## 2020-12-29 PROCEDURE — 84484 ASSAY OF TROPONIN QUANT: CPT | Performed by: EMERGENCY MEDICINE

## 2020-12-29 PROCEDURE — 85730 THROMBOPLASTIN TIME PARTIAL: CPT | Performed by: STUDENT IN AN ORGANIZED HEALTH CARE EDUCATION/TRAINING PROGRAM

## 2020-12-29 PROCEDURE — 85610 PROTHROMBIN TIME: CPT | Performed by: STUDENT IN AN ORGANIZED HEALTH CARE EDUCATION/TRAINING PROGRAM

## 2020-12-29 PROCEDURE — 99220 PR INITIAL OBSERVATION CARE/DAY 70 MINUTES: CPT | Performed by: STUDENT IN AN ORGANIZED HEALTH CARE EDUCATION/TRAINING PROGRAM

## 2020-12-29 PROCEDURE — 99285 EMERGENCY DEPT VISIT HI MDM: CPT | Performed by: EMERGENCY MEDICINE

## 2020-12-29 PROCEDURE — 83735 ASSAY OF MAGNESIUM: CPT | Performed by: STUDENT IN AN ORGANIZED HEALTH CARE EDUCATION/TRAINING PROGRAM

## 2020-12-29 PROCEDURE — 85670 THROMBIN TIME PLASMA: CPT | Performed by: STUDENT IN AN ORGANIZED HEALTH CARE EDUCATION/TRAINING PROGRAM

## 2020-12-29 PROCEDURE — 80053 COMPREHEN METABOLIC PANEL: CPT | Performed by: EMERGENCY MEDICINE

## 2020-12-29 PROCEDURE — 36415 COLL VENOUS BLD VENIPUNCTURE: CPT | Performed by: EMERGENCY MEDICINE

## 2020-12-29 PROCEDURE — 84145 PROCALCITONIN (PCT): CPT | Performed by: PHYSICIAN ASSISTANT

## 2020-12-29 PROCEDURE — 85732 THROMBOPLASTIN TIME PARTIAL: CPT | Performed by: STUDENT IN AN ORGANIZED HEALTH CARE EDUCATION/TRAINING PROGRAM

## 2020-12-29 PROCEDURE — 84484 ASSAY OF TROPONIN QUANT: CPT | Performed by: PHYSICIAN ASSISTANT

## 2020-12-29 RX ORDER — FLUTICASONE FUROATE AND VILANTEROL 100; 25 UG/1; UG/1
1 POWDER RESPIRATORY (INHALATION) DAILY
Status: DISCONTINUED | OUTPATIENT
Start: 2020-12-29 | End: 2021-01-03 | Stop reason: HOSPADM

## 2020-12-29 RX ORDER — THIAMINE MONONITRATE (VIT B1) 100 MG
100 TABLET ORAL DAILY
Status: DISCONTINUED | OUTPATIENT
Start: 2020-12-29 | End: 2021-01-03 | Stop reason: HOSPADM

## 2020-12-29 RX ORDER — SACCHAROMYCES BOULARDII 250 MG
250 CAPSULE ORAL 2 TIMES DAILY
Status: DISCONTINUED | OUTPATIENT
Start: 2020-12-29 | End: 2021-01-03 | Stop reason: HOSPADM

## 2020-12-29 RX ORDER — ZINC SULFATE 50(220)MG
220 CAPSULE ORAL DAILY
Status: DISCONTINUED | OUTPATIENT
Start: 2020-12-29 | End: 2021-01-03 | Stop reason: HOSPADM

## 2020-12-29 RX ORDER — DILTIAZEM HYDROCHLORIDE 180 MG/1
180 CAPSULE, COATED, EXTENDED RELEASE ORAL DAILY
Status: DISCONTINUED | OUTPATIENT
Start: 2020-12-29 | End: 2021-01-03 | Stop reason: HOSPADM

## 2020-12-29 RX ORDER — ACETAMINOPHEN 325 MG/1
975 TABLET ORAL EVERY 6 HOURS PRN
Status: DISCONTINUED | OUTPATIENT
Start: 2020-12-29 | End: 2021-01-03 | Stop reason: HOSPADM

## 2020-12-29 RX ORDER — MELATONIN
2000 DAILY
Status: DISCONTINUED | OUTPATIENT
Start: 2020-12-29 | End: 2021-01-03 | Stop reason: HOSPADM

## 2020-12-29 RX ORDER — LANOLIN ALCOHOL/MO/W.PET/CERES
6 CREAM (GRAM) TOPICAL
Status: DISCONTINUED | OUTPATIENT
Start: 2020-12-29 | End: 2021-01-03 | Stop reason: HOSPADM

## 2020-12-29 RX ORDER — ASCORBIC ACID 500 MG
1000 TABLET ORAL DAILY
Status: DISCONTINUED | OUTPATIENT
Start: 2020-12-29 | End: 2021-01-03 | Stop reason: HOSPADM

## 2020-12-29 RX ORDER — LOSARTAN POTASSIUM 50 MG/1
50 TABLET ORAL DAILY
Status: DISCONTINUED | OUTPATIENT
Start: 2020-12-29 | End: 2021-01-03 | Stop reason: HOSPADM

## 2020-12-29 RX ORDER — PANTOPRAZOLE SODIUM 40 MG/1
40 TABLET, DELAYED RELEASE ORAL DAILY
Status: DISCONTINUED | OUTPATIENT
Start: 2020-12-29 | End: 2021-01-03 | Stop reason: HOSPADM

## 2020-12-29 RX ORDER — ALBUTEROL SULFATE 90 UG/1
2 AEROSOL, METERED RESPIRATORY (INHALATION) EVERY 4 HOURS PRN
Status: DISCONTINUED | OUTPATIENT
Start: 2020-12-29 | End: 2021-01-03 | Stop reason: HOSPADM

## 2020-12-29 RX ORDER — FERROUS SULFATE 325(65) MG
325 TABLET ORAL
Status: DISCONTINUED | OUTPATIENT
Start: 2020-12-29 | End: 2021-01-03 | Stop reason: HOSPADM

## 2020-12-29 RX ORDER — LORATADINE 10 MG/1
10 TABLET ORAL DAILY
Status: DISCONTINUED | OUTPATIENT
Start: 2020-12-29 | End: 2021-01-03 | Stop reason: HOSPADM

## 2020-12-29 RX ORDER — FOLIC ACID 1 MG/1
1 TABLET ORAL DAILY
Status: DISCONTINUED | OUTPATIENT
Start: 2020-12-29 | End: 2021-01-03 | Stop reason: HOSPADM

## 2020-12-29 RX ORDER — DOXYCYCLINE HYCLATE 100 MG/1
100 CAPSULE ORAL EVERY 12 HOURS SCHEDULED
Status: DISCONTINUED | OUTPATIENT
Start: 2020-12-29 | End: 2021-01-03

## 2020-12-29 RX ORDER — CALCIUM CARBONATE 200(500)MG
1000 TABLET,CHEWABLE ORAL DAILY PRN
Status: DISCONTINUED | OUTPATIENT
Start: 2020-12-29 | End: 2021-01-03 | Stop reason: HOSPADM

## 2020-12-29 RX ORDER — ONDANSETRON 2 MG/ML
4 INJECTION INTRAMUSCULAR; INTRAVENOUS EVERY 6 HOURS PRN
Status: DISCONTINUED | OUTPATIENT
Start: 2020-12-29 | End: 2021-01-03 | Stop reason: HOSPADM

## 2020-12-29 RX ADMIN — DILTIAZEM HYDROCHLORIDE 180 MG: 180 CAPSULE, COATED, EXTENDED RELEASE ORAL at 08:42

## 2020-12-29 RX ADMIN — FLUTICASONE FUROATE AND VILANTEROL TRIFENATATE 1 PUFF: 100; 25 POWDER RESPIRATORY (INHALATION) at 08:42

## 2020-12-29 RX ADMIN — Medication 250 MG: at 20:00

## 2020-12-29 RX ADMIN — ACETAMINOPHEN 975 MG: 325 TABLET, FILM COATED ORAL at 20:01

## 2020-12-29 RX ADMIN — THIAMINE HCL TAB 100 MG 100 MG: 100 TAB at 08:41

## 2020-12-29 RX ADMIN — ENOXAPARIN SODIUM 40 MG: 40 INJECTION SUBCUTANEOUS at 08:42

## 2020-12-29 RX ADMIN — DOXYCYCLINE 100 MG: 100 CAPSULE ORAL at 08:43

## 2020-12-29 RX ADMIN — OXYCODONE HYDROCHLORIDE AND ACETAMINOPHEN 1000 MG: 500 TABLET ORAL at 08:41

## 2020-12-29 RX ADMIN — PANTOPRAZOLE SODIUM 40 MG: 40 TABLET, DELAYED RELEASE ORAL at 08:41

## 2020-12-29 RX ADMIN — FOLIC ACID 1 MG: 1 TABLET ORAL at 08:42

## 2020-12-29 RX ADMIN — Medication 2000 UNITS: at 08:41

## 2020-12-29 RX ADMIN — REMDESIVIR 200 MG: 100 INJECTION, POWDER, LYOPHILIZED, FOR SOLUTION INTRAVENOUS at 12:12

## 2020-12-29 RX ADMIN — Medication 250 MG: at 08:41

## 2020-12-29 RX ADMIN — ZINC SULFATE 220 MG (50 MG) CAPSULE 220 MG: CAPSULE at 08:41

## 2020-12-29 RX ADMIN — CEFTRIAXONE SODIUM 1000 MG: 10 INJECTION, POWDER, FOR SOLUTION INTRAVENOUS at 06:36

## 2020-12-29 RX ADMIN — LORATADINE 10 MG: 10 TABLET ORAL at 08:41

## 2020-12-29 RX ADMIN — DOXYCYCLINE 100 MG: 100 CAPSULE ORAL at 22:19

## 2020-12-29 RX ADMIN — FERROUS SULFATE TAB 325 MG (65 MG ELEMENTAL FE) 325 MG: 325 (65 FE) TAB at 08:41

## 2020-12-29 RX ADMIN — LOSARTAN POTASSIUM 50 MG: 50 TABLET, FILM COATED ORAL at 08:41

## 2020-12-29 NOTE — ASSESSMENT & PLAN NOTE
· Was recently admitted from 12/25-12/27 for COVID PNA  States since getting home has had increased coughing and SOB  ALso noted he began having chest pressure on day of admission  (+) diarrhea x 1 day  · Was discharged home on Duracef  Procalcitonin prior to discharge was 1 24   Will recheck this am   · CXR reveals worsening infiltrates  · Will stop Duracef and restart on Rocephin and Doxycycline  · Incentive spirometry  · Respiratory protocol  · Hycodan PRN  · Maintain precautions

## 2020-12-29 NOTE — RESPIRATORY THERAPY NOTE
RT Protocol Note  Katey Field 77 y o  male MRN: [id number]  Unit/Bed#: FT 02 Encounter: [id number]    Assessment    Principal Problem:    Pneumonia due to COVID-19 virus  Active Problems:    Alcohol abuse    Type 2 diabetes mellitus ([facility] )    Benign essential HTN    Anemia    Thrombocytopenia (HCC)    Insomnia    Elevated troponin      Home Pulmonary Medications:  Advair  Home Devices/Therapy: BiPAP/CPAP    Past Medical History:   Diagnosis Date    Arrhythmia     Diabetes mellitus ([facility] )     History of echocardiogram 2017    showed EF of 50-55 percentWith moderate LVH and left ventricle diastolic dysfunction  Left atrium was moderately enlarged  Trace MR noted   History of Holter monitoring 2017    showed baseline rhythm of sinus origin with an average heart it of 61 bpm  The lowest heart rate was 49 and the highest heart rate was 10 8 bpm  There were rare single VPCs, and frequent PACs representing 3 2% of total beats  There were several episodes of sinus arrhythmias with sinus bradycardia and heart rate ranging from 40-90 bpm  No sustained dysrhythmias, or pauses noted   The patient did not    Hypertension     Obese      Social History     Socioeconomic History    Marital status: /Civil Union     Spouse name: None    Number of children: 2    Years of education: None    Highest education level: None   Occupational History    None   Social Needs    Financial resource strain: None    Food insecurity     Worry: None     Inability: None    Transportation needs     Medical: None     Non-medical: None   Tobacco Use    Smoking status: Former Smoker     Packs/day: 0 50     Years: 20 00     Pack years: 10 00     Types: Cigarettes     Quit date:      Years since quittin 0    Smokeless tobacco: Never Used   Substance and Sexual Activity    Alcohol use: Not Currently     Frequency: 4 or more times a week     Drinks per session: 3 or 4     Comment: quit     Drug use: Never    Sexual activity: None   Lifestyle    Physical activity     Days per week: None     Minutes per session: None    Stress: None   Relationships    Social connections     Talks on phone: None     Gets together: None     Attends Zoroastrianism service: None     Active member of club or organization: None     Attends meetings of clubs or organizations: None     Relationship status: None    Intimate partner violence     Fear of current or ex partner: None     Emotionally abused: None     Physically abused: None     Forced sexual activity: None   Other Topics Concern    None   Social History Narrative    · Most recent tobacco use screenin2018      · Do you currently or have you served in [employer]:   No      · Live alone or with others:   with others      · High blood pressure: Yes      · Exercise level:   Occasional      · Overweight:   Yes      · Obese: Yes      · Diabetes:   Yes        Subjective  Pt offers no respiratory complaints at this time  Objective    Physical Exam:   Assessment Type: Assess only  General Appearance: Awake, Alert  Respiratory Pattern: Dyspnea with exertion, Dyspnea at rest  Chest Assessment: Chest expansion symmetrical  Bilateral Breath Sounds: Diminished, Crackles  O2 Device: RA    Vitals:  Blood pressure 119/56, pulse 105, temperature 100 5 °F (38 1 °C), temperature source Oral, resp  rate 22, height 5' 11" (1 803 m), weight 106 kg (234 lb 2 1 oz), SpO2 95 %  Imaging and other studies: I have personally reviewed pertinent reports        O2 Device: RA     Plan    Respiratory Plan: Home Bronchodilator Patient pathway   Ventolin PRN

## 2020-12-29 NOTE — H&P
H&P- Sushila Adams 1955, 77 y o  male MRN: [id number]    Unit/Bed#: FT 02 Encounter: [id number]    Primary Care Provider: Robyn Em MD   Date and time admitted to hospital: 12/29/2020  1:38 AM        * Pneumonia due to COVID-19 virus  Assessment & Plan  · Was recently admitted from 12/25-12/27 for COVID PNA  States since getting home has had increased coughing and SOB  ALso noted he began having chest pressure on day of admission  (+) diarrhea x 1 day  · Was discharged home on Duracef  Procalcitonin prior to discharge was 1 24  Will recheck this am   · CXR reveals worsening infiltrates  · Will stop Duracef and restart on Rocephin and Doxycycline  · Incentive spirometry  · Respiratory protocol  · Hycodan PRN  · Maintain precautions    Chest pressure  Assessment & Plan  · Report chest pressure beginning on day of admission  States pain does worsen with coughing  (+) COVID as above  · Initial troponin 0 05  Will trend x 3  · EKG reveals sinus tach with no Lui  · Telemetry monitoring      Elevated troponin  Assessment & Plan  · Initial troponin slightly elevated at 0 05 as above  Will trend x 3  · May be troponin leak 2/2 viral infection  · See AP above    Type 2 diabetes mellitus [facility])  Assessment & Plan  Lab Results   Component Value Date    HGBA1C 9 6 (H) 12/26/2020       Recent Labs     12/26/20  1629 12/26/20  2138 12/27/20  0526 12/27/20  1109   POCGLU 182* 215* 155* 188*       Blood Sugar Average: Last 72 hrs:     · Hold home dose Amaryl and Glyxembi while inpt  · FSBS AC & HS w SSI  · Diabetic diet    Alcohol abuse  Assessment & Plan  · States has not had any alcohol since 12/24     · Denies any withdrawal symptoms  · Continue folic acid and thiamine    Benign essential HTN  Assessment & Plan  · BP adequately controlled on current regimen  · Continue home dose Losartan  · Monitor BP per unit protocol    Thrombocytopenia (HCC)  Assessment & Plan  · Platelets stable at 94 on admission   · No s/s of bleeding  · Likely 2/2 alcoholic liver disease vs viral infection as above  · CBC in am    Insomnia  Assessment & Plan  · States has been unable to sleep 2/2 cough  · PRN Hycodan ordered  · Melatonin PRN    Anemia  Assessment & Plan  · Hb stable at 8 2 on admission  · CBC in am      VTE Prophylaxis: Enoxaparin (Lovenox)  / sequential compression device   Code Status: Level 1 Full Code  POLST: POLST form is not discussed and not completed at this time  Discussion with family: Wife at bedside    Anticipated Length of Stay:  Patient will be admitted on an Observation basis with an anticipated length of stay of  Less than 2 midnights  Justification for Hospital Stay: See AP above    Total Time for Visit, including Counseling / Coordination of Care: 45 minutes  Greater than 50% of this total time spent on direct patient counseling and coordination of care  Chief Complaint:   Increasing SOB    History of Present Illness:    Misa Simental is a 77 y o  male who presents with increasing SOB  Was recently admitted from 12/25-12/27 for COVID PNA  States since getting home has had increased coughing and SOB  ALso noted he began having chest pressure on day of admission  (+) diarrhea x 1 day  Was discharged home on Duracef  Procalcitonin prior to discharge was 1 24  Will recheck this am     Review of Systems:    Review of Systems   Constitutional: Positive for chills and fever  HENT: Negative for congestion, ear pain, sinus pressure and sore throat  Eyes: Negative for visual disturbance  Respiratory: Positive for cough and shortness of breath  Negative for wheezing  Cardiovascular: Positive for chest pain  Negative for palpitations and leg swelling  Gastrointestinal: Positive for diarrhea  Genitourinary: Negative for difficulty urinating, dysuria and frequency  Musculoskeletal: Negative for neck pain and neck stiffness  Neurological: Negative for dizziness, syncope, light-headedness and headaches     All other systems reviewed and are negative  Past Medical and Surgical History:     Past Medical History:   Diagnosis Date    Arrhythmia     Diabetes mellitus ([facility] [address] )     History of echocardiogram 11/14/2017    showed EF of 50-55 percentWith moderate LVH and left ventricle diastolic dysfunction  Left atrium was moderately enlarged  Trace MR noted   History of Holter monitoring 11/21/2017    showed baseline rhythm of sinus origin with an average heart it of 61 bpm  The lowest heart rate was 49 and the highest heart rate was 10 8 bpm  There were rare single VPCs, and frequent PACs representing 3 2% of total beats  There were several episodes of sinus arrhythmias with sinus bradycardia and heart rate ranging from 40-90 bpm  No sustained dysrhythmias, or pauses noted  The patient did not    Hypertension     Obese        Past Surgical History:   Procedure Laterality Date    EYE SURGERY Left 1997    HERNIA REPAIR  [id number]    KNEE ARTHROPLASTY Right 2008    SHOULDER SURGERY Right 2002       Meds/Allergies:    Prior to Admission medications    Medication Sig Start Date End Date Taking? Authorizing Provider   ascorbic acid (VITAMIN C) 1000 MG tablet Take 1 tablet (1,000 mg total) by mouth daily 12/27/20 1/26/21  Kathie Cabezas MD   cefadroxil (DURICEF) 500 mg capsule Take 1 capsule (500 mg total) by mouth every 12 (twelve) hours for 7 days 12/27/20 1/3/21  Kathie Cabezas MD   cholecalciferol (VITAMIN D3) 1,000 units tablet Take 2 tablets (2,000 Units total) by mouth daily 12/28/20 1/27/21  Kathie Cabezas MD   diltiazem (CARDIZEM CD) 180 mg 24 hr capsule  10/22/20   Historical Provider, MD   ferrous sulfate 325 (65 Fe) mg tablet Take 1 tablet (325 mg total) by mouth daily with breakfast 12/28/20 1/27/21  Kathie Cabezas MD   fluticasone-salmeterol (ADVAIR HFA) 115-21 MCG/ACT inhaler Inhale 2 puffs 2 (two) times a day Rinse mouth after use      Historical Provider, MD   folic acid (FOLVITE) 1 mg tablet Take 1 tablet (1 mg total) by mouth daily 20  Honorio Garcia MD   glimepiride (AMARYL) 4 mg tablet 2 (two) times a day  10/22/20   Historical Provider, MD   Glyxambi 25-5 MG TABS  10/22/20   Historical Provider, MD   loratadine (CLARITIN) 10 mg tablet Take 1 tablet (10 mg total) by mouth daily 20  Honorio Gacria MD   losartan (COZAAR) 50 mg tablet  10/23/20   Historical Provider, MD   pantoprazole (PROTONIX) 40 mg tablet Take 1 tablet (40 mg total) by mouth daily On an empty stomatch, at least 30 mins before breakfast 20  Honorio Garcia MD   promethazine (PHENERGAN) 12 5 mg/10 mL syrup Take by mouth 4 (four) times a day as needed for nausea or vomiting    Historical Provider, MD   thiamine 100 MG tablet Take 1 tablet (100 mg total) by mouth daily 20  Honorio Garcia MD   zinc sulfate (ZINCATE) 220 mg capsule Take 1 capsule (220 mg total) by mouth daily for 4 doses 20  Honorio Garcia MD     I have reviewed home medications with patient personally  Allergies:    Allergies   Allergen Reactions    Sulfa Antibiotics        Social History:     Marital Status: /Civil Union   Patient Pre-hospital Living Situation: Lives w wife  Patient Pre-hospital Level of Mobility: Ambulatory  Patient Pre-hospital Diet Restrictions: Diabetic  Substance Use History:   Social History     Substance and Sexual Activity   Alcohol Use Not Currently    Frequency: 4 or more times a week    Drinks per session: 3 or 4    Comment: quit      Social History     Tobacco Use   Smoking Status Former Smoker    Packs/day: 0 50    Years: 20 00    Pack years: 10 00    Types: Cigarettes    Quit date: 44    Years since quittin 0   Smokeless Tobacco Never Used     Social History     Substance and Sexual Activity   Drug Use Never       Family History:    Family History   Problem Relation Age of Onset    Diabetes Mother     Supraventricular tachycardia Mother     COPD Father     Heart disease Father     Other Father         Sepsis; related to UTI with complicating cardiac problems    Heart disease Paternal Grandmother        Physical Exam:     Vitals:   Blood Pressure: 119/56 (12/29/20 0300)  Pulse: 105 (12/29/20 0630)  Temperature: 100 5 °F (38 1 °C) (12/29/20 0207)  Temp Source: Oral (12/29/20 0207)  Respirations: 22 (12/29/20 0630)  Height: 5' 11" (180 3 cm) (12/29/20 0300)  Weight - Scale: 106 kg (234 lb 2 1 oz) (12/29/20 0300)  SpO2: 95 % (12/29/20 0630)    Physical Exam  Constitutional:       General: He is not in acute distress  Appearance: He is obese  HENT:      Head: Normocephalic and atraumatic  Mouth/Throat:      Mouth: Mucous membranes are moist    Eyes:      Extraocular Movements: Extraocular movements intact  Neck:      Musculoskeletal: Normal range of motion and neck supple  Cardiovascular:      Rate and Rhythm: Regular rhythm  Tachycardia present  Pulses: Normal pulses  Heart sounds: No murmur  No friction rub  No gallop  Pulmonary:      Effort: Pulmonary effort is normal  No respiratory distress  Breath sounds: Normal breath sounds  No wheezing or rhonchi  Abdominal:      Palpations: Abdomen is soft  Tenderness: There is no abdominal tenderness  There is no guarding or rebound  Musculoskeletal: Normal range of motion  General: No swelling or tenderness  Skin:     General: Skin is warm and dry  Neurological:      General: No focal deficit present  Mental Status: He is alert and oriented to person, place, and time  Psychiatric:         Mood and Affect: Mood normal          Behavior: Behavior normal          Thought Content: Thought content normal          Additional Data:     Lab Results: I have personally reviewed pertinent reports        Results from last 7 days   Lab Units 12/29/20 0228   WBC Thousand/uL 6 08   HEMOGLOBIN g/dL 8 2*   HEMATOCRIT % 28 7*   PLATELETS Thousands/uL 94*   NEUTROS PCT % 76*   LYMPHS PCT % 12* MONOS PCT % 11   EOS PCT % 0     Results from last 7 days   Lab Units 12/29/20  0228   SODIUM mmol/L 134*   POTASSIUM mmol/L 4 3   CHLORIDE mmol/L 102   CO2 mmol/L 24   BUN mg/dL 13   CREATININE mg/dL 1 19   ANION GAP mmol/L 8   CALCIUM mg/dL 8 2*   ALBUMIN g/dL 2 5*   TOTAL BILIRUBIN mg/dL 1 20*   ALK PHOS U/L 227*   ALT U/L 75   AST U/L 201*   GLUCOSE RANDOM mg/dL 70     Results from last 7 days   Lab Units 12/25/20  0227   INR  1 25*     Results from last 7 days   Lab Units 12/27/20  1109 12/27/20  0526 12/26/20  2138 12/26/20  1629 12/26/20  1135 12/26/20  0510 12/25/20  2026 12/25/20  1540 12/25/20  1355 12/25/20  0222   POC GLUCOSE mg/dl 188* 155* 215* 182* 161* 100 193* 160* 124 152*     Results from last 7 days   Lab Units 12/26/20  0503   HEMOGLOBIN A1C % 9 6*     Results from last 7 days   Lab Units 12/27/20  0532 12/26/20  0504 12/25/20  0513 12/25/20  0227   LACTIC ACID mmol/L  --   --  3 0* 3 5*   PROCALCITONIN ng/ml 1 24* 1 14*  --   --        Imaging: I have personally reviewed pertinent reports  XR chest 1 view portable   ED Interpretation by Levi Garces MD (12/29 7537)   Patchy bilateral infiltrates      Final Result by Chandan Sesay MD (12/29 9209)      Marked worsening of bilateral groundglass opacity due to Covid-19 pneumonia  Workstation performed: [id number]             EKG, Pathology, and Other Studies Reviewed on Admission:   · EKG: Sonus tach with no Lui    Allscripts / Epic Records Reviewed: Yes     ** Please Note: This note has been constructed using a voice recognition system   **

## 2020-12-29 NOTE — ASSESSMENT & PLAN NOTE
· Platelets stable at 94 on admission   · No s/s of bleeding  · Likely 2/2 alcoholic liver disease vs viral infection as above  · CBC in am

## 2020-12-29 NOTE — ED NOTES
Pt's BG-45, pt reports feeling fine, no sweating, not shaky or anxious  Pt given 8oz  Of orange juice and crackers        Deshawn Hutchison RN  12/29/20 2809

## 2020-12-29 NOTE — ED PROVIDER NOTES
History  Chief Complaint   Patient presents with    Shortness of Breath     pt presents to ED with shortness of breath and cough    Fever - 9 weeks to 76 years     HPI  76 yo M htn, DM, obesity, alcohol abuse presents with SOB and cough in addition to fevers  He was discharged two days ago after being diagnosed with COVID-19 pneumonia  Had negative CTA for PE at that time  Has had cough for the past month  Was discharged on antibiotics which he has been taking  He has tried honey for cough without improvement  Prior to Admission Medications   Prescriptions Last Dose Informant Patient Reported? Taking? Glyxambi 25-5 MG TABS   Yes No   ascorbic acid (VITAMIN C) 1000 MG tablet   No No   Sig: Take 1 tablet (1,000 mg total) by mouth daily   cefadroxil (DURICEF) 500 mg capsule   No No   Sig: Take 1 capsule (500 mg total) by mouth every 12 (twelve) hours for 7 days   cholecalciferol (VITAMIN D3) 1,000 units tablet   No No   Sig: Take 2 tablets (2,000 Units total) by mouth daily   diltiazem (CARDIZEM CD) 180 mg 24 hr capsule   Yes No   ferrous sulfate 325 (65 Fe) mg tablet   No No   Sig: Take 1 tablet (325 mg total) by mouth daily with breakfast   fluticasone-salmeterol (ADVAIR HFA) 115-21 MCG/ACT inhaler   Yes No   Sig: Inhale 2 puffs 2 (two) times a day Rinse mouth after use     folic acid (FOLVITE) 1 mg tablet   No No   Sig: Take 1 tablet (1 mg total) by mouth daily   glimepiride (AMARYL) 4 mg tablet   Yes No   Si (two) times a day    loratadine (CLARITIN) 10 mg tablet   No No   Sig: Take 1 tablet (10 mg total) by mouth daily   losartan (COZAAR) 50 mg tablet   Yes No   pantoprazole (PROTONIX) 40 mg tablet   No No   Sig: Take 1 tablet (40 mg total) by mouth daily On an empty stomatch, at least 30 mins before breakfast   promethazine (PHENERGAN) 12 5 mg/10 mL syrup   Yes No   Sig: Take by mouth 4 (four) times a day as needed for nausea or vomiting   thiamine 100 MG tablet   No No   Sig: Take 1 tablet (100 mg total) by mouth daily   zinc sulfate (ZINCATE) 220 mg capsule   No No   Sig: Take 1 capsule (220 mg total) by mouth daily for 4 doses      Facility-Administered Medications: None       Past Medical History:   Diagnosis Date    Arrhythmia     Diabetes mellitus ([address] )     History of echocardiogram 11/14/2017    showed EF of 50-55 percentWith moderate LVH and left ventricle diastolic dysfunction  Left atrium was moderately enlarged  Trace MR noted   History of Holter monitoring 11/21/2017    showed baseline rhythm of sinus origin with an average heart it of 61 bpm  The lowest heart rate was 49 and the highest heart rate was 10 8 bpm  There were rare single VPCs, and frequent PACs representing 3 2% of total beats  There were several episodes of sinus arrhythmias with sinus bradycardia and heart rate ranging from 40-90 bpm  No sustained dysrhythmias, or pauses noted  The patient did not    Hypertension     Obese        Past Surgical History:   Procedure Laterality Date    EYE SURGERY Left 1997    HERNIA REPAIR  [id number]    KNEE ARTHROPLASTY Right 2008    SHOULDER SURGERY Right 2002       Family History   Problem Relation Age of Onset    Diabetes Mother     Supraventricular tachycardia Mother     COPD Father     Heart disease Father     Other Father         Sepsis; related to UTI with complicating cardiac problems    Heart disease Paternal Grandmother      I have reviewed and agree with the history as documented  E-Cigarette/Vaping    E-Cigarette Use Never User      E-Cigarette/Vaping Substances     Social History     Tobacco Use    Smoking status: Former Smoker     Packs/day: 0 50     Years: 20 00     Pack years: 10 00     Types: Cigarettes    Smokeless tobacco: Never Used   Substance Use Topics    Alcohol use: Yes     Frequency: 4 or more times a week     Drinks per session: 3 or 4     Comment: drinks rum    Drug use: Never       Review of Systems   Constitutional: Positive for fever   Negative for chills  HENT: Negative for dental problem and ear pain  Eyes: Negative for pain and redness  Respiratory: Positive for cough and shortness of breath  Cardiovascular: Negative for chest pain and palpitations  Gastrointestinal: Negative for abdominal pain and nausea  Endocrine: Negative for polydipsia and polyphagia  Genitourinary: Negative for dysuria and frequency  Musculoskeletal: Negative for arthralgias and joint swelling  Skin: Negative for color change and rash  Neurological: Negative for dizziness and headaches  Psychiatric/Behavioral: Negative for behavioral problems and confusion  All other systems reviewed and are negative  Physical Exam  Physical Exam  Vitals signs and nursing note reviewed  Constitutional:       General: He is not in acute distress  Appearance: He is well-developed  He is not diaphoretic  Comments: Frequent coughing   HENT:      Head: Atraumatic  Right Ear: External ear normal       Left Ear: External ear normal       Nose: Nose normal    Eyes:      Conjunctiva/sclera: Conjunctivae normal       Pupils: Pupils are equal, round, and reactive to light  Neck:      Musculoskeletal: Normal range of motion and neck supple  Vascular: No JVD  Cardiovascular:      Rate and Rhythm: Normal rate and regular rhythm  Heart sounds: Normal heart sounds  No murmur  Pulmonary:      Effort: Pulmonary effort is normal  No respiratory distress  Breath sounds: Normal breath sounds  No wheezing  Abdominal:      General: Bowel sounds are normal  There is no distension  Palpations: Abdomen is soft  Tenderness: There is no abdominal tenderness  Musculoskeletal: Normal range of motion  Skin:     General: Skin is warm and dry  Capillary Refill: Capillary refill takes less than 2 seconds  Neurological:      Mental Status: He is alert and oriented to person, place, and time  Cranial Nerves: No cranial nerve deficit  Psychiatric:         Behavior: Behavior normal          Vital Signs  ED Triage Vitals   Temperature Pulse Respirations Blood Pressure SpO2   12/29/20 0207 12/29/20 0207 12/29/20 0207 12/29/20 0300 12/29/20 0207   100 5 °F (38 1 °C) 100 22 119/56 95 %      Temp Source Heart Rate Source Patient Position - Orthostatic VS BP Location FiO2 (%)   12/29/20 0207 12/29/20 0207 12/29/20 0207 12/29/20 0207 --   Oral Monitor Lying Right arm       Pain Score       --                  Vitals:    12/29/20 0207 12/29/20 0300   BP:  119/56   Pulse: 100 102   Patient Position - Orthostatic VS: Lying Lying         Visual Acuity      ED Medications  Medications - No data to display    Diagnostic Studies  Results Reviewed     Procedure Component Value Units Date/Time    CBC and differential [[id number]]  (Abnormal) Collected: 12/29/20 0228    Lab Status: Final result Specimen: Blood from Arm, Right Updated: 12/29/20 0302     WBC 6 08 Thousand/uL      RBC 4 07 Million/uL      Hemoglobin 8 2 g/dL      Hematocrit 28 7 %      MCV 71 fL      MCH 20 1 pg      MCHC 28 6 g/dL      RDW 21 2 %      Platelets 94 Thousands/uL      nRBC 3 /100 WBCs      Neutrophils Relative 76 %      Immat GRANS % 1 %      Lymphocytes Relative 12 %      Monocytes Relative 11 %      Eosinophils Relative 0 %      Basophils Relative 0 %      Neutrophils Absolute 4 63 Thousands/µL      Immature Grans Absolute 0 06 Thousand/uL      Lymphocytes Absolute 0 71 Thousands/µL      Monocytes Absolute 0 66 Thousand/µL      Eosinophils Absolute 0 01 Thousand/µL      Basophils Absolute 0 01 Thousands/µL     Troponin I [[id number]]  (Abnormal) Collected: 12/29/20 0228    Lab Status: Final result Specimen: Blood from Arm, Right Updated: 12/29/20 0255     Troponin I 0 05 ng/mL     Comprehensive metabolic panel [[id number]]  (Abnormal) Collected: 12/29/20 0228    Lab Status: Final result Specimen: Blood from Arm, Right Updated: 12/29/20 0251     Sodium 134 mmol/L      Potassium 4 3 mmol/L      Chloride 102 mmol/L      CO2 24 mmol/L      ANION GAP 8 mmol/L      BUN 13 mg/dL      Creatinine 1 19 mg/dL      Glucose 70 mg/dL      Calcium 8 2 mg/dL      Corrected Calcium 9 4 mg/dL       U/L      ALT 75 U/L      Alkaline Phosphatase 227 U/L      Total Protein 6 9 g/dL      Albumin 2 5 g/dL      Total Bilirubin 1 20 mg/dL      eGFR 63 ml/min/1 73sq m     Narrative:      [place] guidelines for Chronic Kidney Disease (CKD):     Stage 1 with normal or high GFR (GFR > 90 mL/min/1 73 square meters)    Stage 2 Mild CKD (GFR = 60-89 mL/min/1 73 square meters)    Stage 3A Moderate CKD (GFR = 45-59 mL/min/1 73 square meters)    Stage 3B Moderate CKD (GFR = 30-44 mL/min/1 73 square meters)    Stage 4 Severe CKD (GFR = 15-29 mL/min/1 73 square meters)    Stage 5 End Stage CKD (GFR <15 mL/min/1 73 square meters)  Note: GFR calculation is accurate only with a steady state creatinine                 XR chest 1 view portable   ED Interpretation by Alyce Delgado MD (12/29 0716)   Patchy bilateral infiltrates                 Procedures  ECG 12 Lead Documentation Only    Date/Time: 12/29/2020 2:27 AM  Performed by: Alyce Delgado MD  Authorized by: Alyce Delgado MD     Comments:      Sinus tachycardia rate of 103 normal axis and intervals no acute ST elevations or depressions             ED Course                             SBIRT 22yo+      Most Recent Value   SBIRT (25 yo +)   In order to provide better care to our patients, we are screening all of our patients for alcohol and drug use  Would it be okay to ask you these screening questions? Unable to answer at this time Filed at: 12/29/2020 0230                    MDM  Patient presents with worsening shortness of breath, +COVID pneumonia  CXR shows worsening infiltrates  Troponin elevated to 0 05  Patient tachypneic and SOB with minimal ambulation   Will admit patient for further treatment  Disposition  Final diagnoses: Pneumonia due to COVID-19 virus     Time reflects when diagnosis was documented in both MDM as applicable and the Disposition within this note     Time User Action Codes Description Comment    12/29/2020  3:43 AM Dyana [place] Add [U07 1,  J12 89] Pneumonia due to COVID-19 virus       ED Disposition     ED Disposition Condition Date/Time Comment    Admit Stable Tue Dec 29, 2020  3:43 AM Case was discussed with Erasmo Oscar and the patient's admission status was agreed to be Admission Status: observation status to the service of Dr Doris Nj   Follow-up Information    None         Patient's Medications   Discharge Prescriptions    No medications on file     No discharge procedures on file      PDMP Review       Value Time User    PDMP Reviewed  Yes 12/27/2020 10:39 AM Tameka Bruce MD          ED Provider  Electronically Signed by           Alyce Delgado MD  12/29/20 9042

## 2020-12-29 NOTE — ASSESSMENT & PLAN NOTE
· BP adequately controlled on current regimen  · Continue home dose Losartan  · Monitor BP per unit protocol

## 2020-12-29 NOTE — ASSESSMENT & PLAN NOTE
· Report chest pressure beginning on day of admission  States pain does worsen with coughing  (+) COVID as above  · Initial troponin 0 05   Will trend x 3  · EKG reveals sinus tach with no Lui  · Telemetry monitoring

## 2020-12-29 NOTE — ASSESSMENT & PLAN NOTE
Lab Results   Component Value Date    HGBA1C 9 6 (H) 12/26/2020       Recent Labs     12/26/20  1629 12/26/20  2138 12/27/20  0526 12/27/20  1109   POCGLU 182* 215* 155* 188*       Blood Sugar Average: Last 72 hrs:     · Hold home dose Amaryl and Glyxembi while inpt  · FSBS AC & HS w SSI  · Diabetic diet

## 2020-12-29 NOTE — UTILIZATION REVIEW
Notification of Discharge  This is a Notification of Discharge from our facility [address]  Please be advised that this patient has been discharge from our facility  Below you will find the admission and discharge date and time including the patients disposition  PRESENTATION DATE: 12/25/2020 10:47 AM  OBS ADMISSION DATE:   IP ADMISSION DATE: 12/25/20 1047   DISCHARGE DATE: 12/27/2020  1:03 PM  DISPOSITION: Home/Self Care Home/Self Care   Admission Orders listed below:  Admission Orders (From admission, onward)     Ordered        12/25/20 1217  Inpatient Admission  Once                   Please contact the UR Department if additional information is required to close this patient's authorization/case  [facility] Utilization Review Department  Main: [phone] x carefully listen to the prompts  All voicemails are confidential   [email or web address]  Send all requests for admission clinical reviews, approved or denied determinations and any other requests to dedicated fax number below belonging to the campus where the patient is receiving treatment   List of dedicated fax numbers:  [address] DENIALS (Administrative/Medical Necessity) [phone]   [address] (Maternity/NICU/Pediatrics) [phone]   Deneise Notice [phone]   Kenney Todd [phone]   Mark Duncan [phone]   [facility] [address] [place] [address] [phone]   [facility]  [phone]   [address]  [address] [facility] [address] [facility] [phone]

## 2020-12-29 NOTE — ASSESSMENT & PLAN NOTE
· Initial troponin slightly elevated at 0 05 as above   Will trend x 3  · May be troponin leak 2/2 viral infection  · See AP above

## 2020-12-29 NOTE — ASSESSMENT & PLAN NOTE
· States has not had any alcohol since 12/24     · Denies any withdrawal symptoms  · Continue folic acid and thiamine

## 2020-12-30 LAB
AFP-TM SERPL-MCNC: 4.3 NG/ML (ref 0.5–8)
ALBUMIN SERPL BCP-MCNC: 2.3 G/DL (ref 3.5–5)
ALP SERPL-CCNC: 212 U/L (ref 46–116)
ALT SERPL W P-5'-P-CCNC: 73 U/L (ref 12–78)
ANION GAP SERPL CALCULATED.3IONS-SCNC: 9 MMOL/L (ref 4–13)
APTT 1H NP PPP: 36 SECONDS (ref 24–36)
APTT IMM NP PPP: 34.4 SECONDS (ref 24–36)
APTT PPP: 45 SECONDS (ref 23–37)
AST SERPL W P-5'-P-CCNC: 185 U/L (ref 5–45)
BACTERIA BLD CULT: NORMAL
BACTERIA BLD CULT: NORMAL
BASOPHILS # BLD AUTO: 0.02 THOUSANDS/ΜL (ref 0–0.1)
BASOPHILS NFR BLD AUTO: 1 % (ref 0–1)
BILIRUB SERPL-MCNC: 1.3 MG/DL (ref 0.2–1)
BUN SERPL-MCNC: 14 MG/DL (ref 5–25)
CALCIUM ALBUM COR SERPL-MCNC: 9.6 MG/DL (ref 8.3–10.1)
CALCIUM SERPL-MCNC: 8.2 MG/DL (ref 8.3–10.1)
CHLORIDE SERPL-SCNC: 104 MMOL/L (ref 100–108)
CO2 SERPL-SCNC: 22 MMOL/L (ref 21–32)
CREAT SERPL-MCNC: 1.09 MG/DL (ref 0.6–1.3)
CRP SERPL QL: 62.3 MG/L
D DIMER PPP FEU-MCNC: 1.72 UG/ML FEU
EOSINOPHIL # BLD AUTO: 0.02 THOUSAND/ΜL (ref 0–0.61)
EOSINOPHIL NFR BLD AUTO: 1 % (ref 0–6)
ERYTHROCYTE [DISTWIDTH] IN BLOOD BY AUTOMATED COUNT: 22.8 % (ref 11.6–15.1)
GFR SERPL CREATININE-BSD FRML MDRD: 70 ML/MIN/1.73SQ M
GLUCOSE P FAST SERPL-MCNC: 58 MG/DL (ref 65–99)
GLUCOSE SERPL-MCNC: 168 MG/DL (ref 65–140)
GLUCOSE SERPL-MCNC: 169 MG/DL (ref 65–140)
GLUCOSE SERPL-MCNC: 180 MG/DL (ref 65–140)
GLUCOSE SERPL-MCNC: 181 MG/DL (ref 65–140)
GLUCOSE SERPL-MCNC: 58 MG/DL (ref 65–140)
GLUCOSE SERPL-MCNC: 77 MG/DL (ref 65–140)
HAV IGM SER QL: NORMAL
HBV CORE IGM SER QL: NORMAL
HBV SURFACE AG SER QL: NORMAL
HCT VFR BLD AUTO: 32.5 % (ref 36.5–49.3)
HCV AB SER QL: NORMAL
HGB BLD-MCNC: 9 G/DL (ref 12–17)
IMM GRANULOCYTES # BLD AUTO: 0.03 THOUSAND/UL (ref 0–0.2)
IMM GRANULOCYTES NFR BLD AUTO: 1 % (ref 0–2)
LYMPHOCYTES # BLD AUTO: 0.92 THOUSANDS/ΜL (ref 0.6–4.47)
LYMPHOCYTES NFR BLD AUTO: 27 % (ref 14–44)
MCH RBC QN AUTO: 20.3 PG (ref 26.8–34.3)
MCHC RBC AUTO-ENTMCNC: 27.7 G/DL (ref 31.4–37.4)
MCV RBC AUTO: 73 FL (ref 82–98)
MONOCYTES # BLD AUTO: 0.35 THOUSAND/ΜL (ref 0.17–1.22)
MONOCYTES NFR BLD AUTO: 10 % (ref 4–12)
NEUTROPHILS # BLD AUTO: 2.11 THOUSANDS/ΜL (ref 1.85–7.62)
NEUTS SEG NFR BLD AUTO: 60 % (ref 43–75)
NRBC BLD AUTO-RTO: 1 /100 WBCS
PLATELET # BLD AUTO: 69 THOUSANDS/UL (ref 149–390)
POTASSIUM SERPL-SCNC: 3.9 MMOL/L (ref 3.5–5.3)
PROCALCITONIN SERPL-MCNC: 1.63 NG/ML
PROT SERPL-MCNC: 6.6 G/DL (ref 6.4–8.2)
PROTHROMBIN TIME: 15.1 SECONDS (ref 11.6–14.5)
PT 1H NP PPP: 14 SEC (ref 12–14.3)
PT IMM NP PPP: 13.4 SECONDS (ref 12–14.3)
RBC # BLD AUTO: 4.44 MILLION/UL (ref 3.88–5.62)
SODIUM SERPL-SCNC: 135 MMOL/L (ref 136–145)
THROMBIN TIME MIXING INCUBATED: 19.5 SECONDS (ref 14.7–18.4)
THROMBIN TIME MIXING ROOM TEMP: 19.6 SECONDS (ref 14.7–18.4)
THROMBIN TIME: 20.7 SECONDS (ref 14.7–18.4)
WBC # BLD AUTO: 3.45 THOUSAND/UL (ref 4.31–10.16)

## 2020-12-30 PROCEDURE — 99232 SBSQ HOSP IP/OBS MODERATE 35: CPT | Performed by: STUDENT IN AN ORGANIZED HEALTH CARE EDUCATION/TRAINING PROGRAM

## 2020-12-30 PROCEDURE — 84145 PROCALCITONIN (PCT): CPT | Performed by: PHYSICIAN ASSISTANT

## 2020-12-30 PROCEDURE — 94660 CPAP INITIATION&MGMT: CPT

## 2020-12-30 PROCEDURE — 94760 N-INVAS EAR/PLS OXIMETRY 1: CPT

## 2020-12-30 PROCEDURE — 86140 C-REACTIVE PROTEIN: CPT | Performed by: STUDENT IN AN ORGANIZED HEALTH CARE EDUCATION/TRAINING PROGRAM

## 2020-12-30 PROCEDURE — 85379 FIBRIN DEGRADATION QUANT: CPT | Performed by: STUDENT IN AN ORGANIZED HEALTH CARE EDUCATION/TRAINING PROGRAM

## 2020-12-30 PROCEDURE — 85025 COMPLETE CBC W/AUTO DIFF WBC: CPT | Performed by: STUDENT IN AN ORGANIZED HEALTH CARE EDUCATION/TRAINING PROGRAM

## 2020-12-30 PROCEDURE — 80053 COMPREHEN METABOLIC PANEL: CPT | Performed by: STUDENT IN AN ORGANIZED HEALTH CARE EDUCATION/TRAINING PROGRAM

## 2020-12-30 PROCEDURE — 82948 REAGENT STRIP/BLOOD GLUCOSE: CPT

## 2020-12-30 PROCEDURE — 80074 ACUTE HEPATITIS PANEL: CPT | Performed by: STUDENT IN AN ORGANIZED HEALTH CARE EDUCATION/TRAINING PROGRAM

## 2020-12-30 PROCEDURE — 82105 ALPHA-FETOPROTEIN SERUM: CPT | Performed by: STUDENT IN AN ORGANIZED HEALTH CARE EDUCATION/TRAINING PROGRAM

## 2020-12-30 RX ADMIN — ACETAMINOPHEN 975 MG: 325 TABLET, FILM COATED ORAL at 22:00

## 2020-12-30 RX ADMIN — INSULIN LISPRO 1 UNITS: 100 INJECTION, SOLUTION INTRAVENOUS; SUBCUTANEOUS at 11:58

## 2020-12-30 RX ADMIN — OXYCODONE HYDROCHLORIDE AND ACETAMINOPHEN 1000 MG: 500 TABLET ORAL at 09:17

## 2020-12-30 RX ADMIN — ZINC SULFATE 220 MG (50 MG) CAPSULE 220 MG: CAPSULE at 09:15

## 2020-12-30 RX ADMIN — REMDESIVIR 100 MG: 100 INJECTION, POWDER, LYOPHILIZED, FOR SOLUTION INTRAVENOUS at 11:58

## 2020-12-30 RX ADMIN — CEFTRIAXONE SODIUM 1000 MG: 10 INJECTION, POWDER, FOR SOLUTION INTRAVENOUS at 05:46

## 2020-12-30 RX ADMIN — INSULIN LISPRO 1 UNITS: 100 INJECTION, SOLUTION INTRAVENOUS; SUBCUTANEOUS at 22:01

## 2020-12-30 RX ADMIN — Medication 2000 UNITS: at 09:15

## 2020-12-30 RX ADMIN — THIAMINE HCL TAB 100 MG 100 MG: 100 TAB at 09:17

## 2020-12-30 RX ADMIN — ENOXAPARIN SODIUM 40 MG: 40 INJECTION SUBCUTANEOUS at 09:15

## 2020-12-30 RX ADMIN — FLUTICASONE FUROATE AND VILANTEROL TRIFENATATE 1 PUFF: 100; 25 POWDER RESPIRATORY (INHALATION) at 11:58

## 2020-12-30 RX ADMIN — Medication 250 MG: at 18:15

## 2020-12-30 RX ADMIN — LOSARTAN POTASSIUM 50 MG: 50 TABLET, FILM COATED ORAL at 09:16

## 2020-12-30 RX ADMIN — HYDROCODONE BITARTRATE AND HOMATROPINE METHYLBROMIDE 5 ML: 5; 1.5 SYRUP ORAL at 18:14

## 2020-12-30 RX ADMIN — HYDROCODONE BITARTRATE AND HOMATROPINE METHYLBROMIDE 5 ML: 5; 1.5 SYRUP ORAL at 22:04

## 2020-12-30 RX ADMIN — FOLIC ACID 1 MG: 1 TABLET ORAL at 09:16

## 2020-12-30 RX ADMIN — FERROUS SULFATE TAB 325 MG (65 MG ELEMENTAL FE) 325 MG: 325 (65 FE) TAB at 08:16

## 2020-12-30 RX ADMIN — DOXYCYCLINE 100 MG: 100 CAPSULE ORAL at 09:18

## 2020-12-30 RX ADMIN — ACETAMINOPHEN 975 MG: 325 TABLET, FILM COATED ORAL at 06:08

## 2020-12-30 RX ADMIN — DOXYCYCLINE 100 MG: 100 CAPSULE ORAL at 22:00

## 2020-12-30 RX ADMIN — LORATADINE 10 MG: 10 TABLET ORAL at 09:16

## 2020-12-30 RX ADMIN — DILTIAZEM HYDROCHLORIDE 180 MG: 180 CAPSULE, COATED, EXTENDED RELEASE ORAL at 09:17

## 2020-12-30 RX ADMIN — INSULIN LISPRO 1 UNITS: 100 INJECTION, SOLUTION INTRAVENOUS; SUBCUTANEOUS at 16:11

## 2020-12-30 RX ADMIN — PANTOPRAZOLE SODIUM 40 MG: 40 TABLET, DELAYED RELEASE ORAL at 09:18

## 2020-12-30 RX ADMIN — Medication 250 MG: at 09:15

## 2020-12-30 NOTE — UTILIZATION REVIEW
Initial Clinical Review    Admission: Date/Time/Statement:   OBSERVATION 12-29-20 0343   CHANGED TO INPATIENT 12-30-20 1525 FOR CONTINUED TREATMENT OF COVID 19 PNEUMONIA     Inpatient Admission Once     Transfer Service: Hospitalist       Question Answer   Admitting Physician Clay Espinal V   Level of Care Med Surg   Estimated length of stay More than 2 Midnights   Certification I certify that inpatient services are medically necessary for this patient for a duration of greater than two midnights  See H&P and MD Progress Notes for additional information about the patient's course of treatment  12/30/20 1525       ED Arrival Information     Expected Arrival Acuity Means of Arrival Escorted By Service Admission Type    - 12/29/2020 01:17 Urgent Walk-In Spouse General Medicine Urgent    Arrival Complaint    sob, fever, covid+        Chief Complaint   Patient presents with    Shortness of Breath     pt presents to ED with shortness of breath and cough    Fever - 9 weeks to 74 years     Assessment/Plan:    77year old male presents to ed from home for evaluation and treatment of shortness of breath, cough and fevers  PMHX: DM, HTN, ETOH ABUSE On arrival her reports discharge 2 days ago with diagnosis of covid 19 pneumonia  CTA negative for PE at that time  He has been compliant with antibiotics  Clinical assessment significant for persistent / frequent cough, fever, tachycardia, tachypnea  Troponin,d -dimer, lactic acid, procalcitonin  and  crp elevated  Chest x ray with worsening ground glass opacities consistent with covid pneumonia  Treated in ed with iv ceftriaxone, iv remdesivir  Admit to observation for covid 19 pneumonia  Plan includes : isolation, iv remdesivir, iv ceftriaxone, supplemental oxygen prn, serial ciwa assessments, telemetry, continuous pulse oximetry, cpap,trend troponin  12-30-20    Fever persists   Continue iv antibiotic, iv remdesivir, isolation, monitor O2 sats ED Triage Vitals   12/29/20 0207 12/29/20 0207 12/29/20 0207 12/29/20 0300 12/29/20 0207   100 5 °F (38 1 °C) 100 22 119/56 95 %      Oral Monitor         No Pain          12/29/20 106 kg (234 lb 2 1 oz)     Additional Vital Signs:     Date/Time  Temp  Pulse  Resp  BP  MAP (mmHg)  SpO2  O2 Device   12/30/20 07:11:33  100 8 °F (38 2 °C)  Abnormal   98  --  --  --  94 %  --   12/29/20 22:22:58  100 3 °F (37 9 °C)  93  --  113/67  82  94 %  --   12/29/20 19:33:06  101 1 °F (38 4 °C)  Abnormal   95  20  105/68  80  96 %  None (Room air)   12/29/20 1430  --  83  22  109/59  81  96 %  --   12/29/20 1300  --  95  20  104/66  81  96 %  --   12/29/20 1130  --  85  20  110/60  79  94 %  None (Room air)   12/29/20 1000  --  101  20  115/64  84  94 %  --   12/29/20 0900  --  96  20  126/59  85  97 %  --   12/29/20 0730  --  96  20  134/63  91  97 %  --   12/29/20 0630  --  105  22  --  --  95 %  --   12/29/20 0300  --  102  25  Abnormal   119/56  80  95 %  None (Room air)           Pertinent Labs/Diagnostic Test Results:     XR chest 1 view portable      (12/29 0708)      Marked worsening of bilateral groundglass opacity due to Covid-19 pneumonia             Results from last 7 days   Lab Units 12/25/20  0227   SARS-COV-2  Positive*     Results from last 7 days   Lab Units 12/30/20  0554 12/29/20  1112 12/29/20  0228 12/27/20  0532 12/26/20  0503 12/25/20  0227   WBC Thousand/uL 3 45*  --  6 08 2 59* 3 16* 4 64   HEMOGLOBIN g/dL 9 0*  --  8 2* 7 8* 7 8* 7 9*   HEMATOCRIT % 32 5*  --  28 7* 27 5* 27 2* 28 2*   PLATELETS Thousands/uL 69* 84* 94* 81* 87* 109*   NEUTROS ABS Thousands/µL 2 11  --  4 63 1 42* 1 86 3 23         Results from last 7 days   Lab Units 12/30/20  0554 12/29/20  1112 12/29/20  0228 12/27/20  0532 12/26/20  0504   SODIUM mmol/L 135* 134* 134* 135* 134*   POTASSIUM mmol/L 3 9 3 5 4 3 3 6 4 1   CHLORIDE mmol/L 104 101 102 103 103   CO2 mmol/L 22 23 24 20* 20*   ANION GAP mmol/L 9 10 8 12 11   BUN mg/dL 14 13 13 15 17   CREATININE mg/dL 1 09 1 12 1 19 1 10 1 08   EGFR ml/min/1 73sq m 70 68 63 69 71   CALCIUM mg/dL 8 2* 7 8* 8 2* 7 9* 7 9*   MAGNESIUM mg/dL  --  2 0  --   --  2 0     Results from last 7 days   Lab Units 12/30/20  0554 12/29/20  1112 12/29/20  0228 12/27/20  0532 12/26/20  0504   AST U/L 185* 172* 201* 118* 164*   ALT U/L 73 71 75 65 74   ALK PHOS U/L 212* 201* 227* 207* 205*   TOTAL PROTEIN g/dL 6 6 6 1* 6 9 6 1* 6 5   ALBUMIN g/dL 2 3* 2 3* 2 5* 2 3* 2 5*   TOTAL BILIRUBIN mg/dL 1 30* 1 10* 1 20* 1 10* 1 20*     Results from last 7 days   Lab Units 12/30/20  0642 12/29/20  2127 12/29/20  1725 12/29/20  1205 12/29/20  1036 12/29/20  0827 12/27/20  1109 12/27/20  0526 12/26/20  2138 12/26/20  1629 12/26/20  1135 12/26/20  0510   POC GLUCOSE mg/dl 77 102 59* 93 127 45* 188* 155* 215* 182* 161* 100     Results from last 7 days   Lab Units 12/30/20  0554 12/29/20  1112 12/29/20  0228 12/27/20  0532 12/26/20  0504 12/25/20  0227   GLUCOSE RANDOM mg/dL 58* 113 70 151* 97 156*         Results from last 7 days   Lab Units 12/26/20  0503   HEMOGLOBIN A1C % 9 6*   EAG mg/dl 229     BETA-HYDROXYBUTYRATE   Date Value Ref Range Status   12/25/2020 0 1 <0 6 mmol/L Final          Results from last 7 days   Lab Units 12/25/20  0227   PH JENIFER  7 436*   PCO2 JENIFER mm Hg 24 3*   PO2 JENIFER mm Hg 62 8*   HCO3 JENIFER mmol/L 16 0*   BASE EXC JENIFER mmol/L -7 1   O2 CONTENT JENIFER ml/dL 10 9   O2 HGB, VENOUS % 87 5*             Results from last 7 days   Lab Units 12/29/20  1211 12/29/20  1112 12/29/20  0716 12/29/20  0228 12/25/20  0227   TROPONIN I ng/mL 0 04 0 04 0 05* 0 05* 0 02     Results from last 7 days   Lab Units 12/30/20  0554 12/27/20  0532 12/26/20  0504 12/25/20 0227   D-DIMER QUANTITATIVE ug/ml FEU 1 72* 0 94* 0 95* 0 85*     Results from last 7 days   Lab Units 12/29/20  1112 12/25/20 0227   PROTIME seconds 15 1*  15 5* 15 8*   INR  1 29* 1 25*   PTT seconds 45*  46* 36         Results from last 7 days   Lab Units 12/29/20  0716 12/27/20  0532 12/26/20  0504   PROCALCITONIN ng/ml 1 97* 1 24* 1 14*     Results from last 7 days   Lab Units 12/25/20  0513 12/25/20  0227   LACTIC ACID mmol/L 3 0* 3 5*             Results from last 7 days   Lab Units 12/25/20  0227   NT-PRO BNP pg/mL 79     Results from last 7 days   Lab Units 12/27/20  0532 12/26/20  0503 12/25/20  0227   FERRITIN ng/mL 72 20  21 15             Results from last 7 days   Lab Units 12/30/20  0554 12/27/20  0532 12/26/20  0504   CRP mg/L 62 3* 45 6* 46 7*             Results from last 7 days   Lab Units 12/25/20 0227   INFLUENZA A PCR  Negative   INFLUENZA B PCR  Negative   RSV PCR  Negative             Results from last 7 days   Lab Units 12/25/20  0227   ETHANOL LVL mg/dL 158*     Results from last 7 days   Lab Units 12/25/20  0513 12/25/20  0227   BLOOD CULTURE  No Growth After 4 Days  No Growth After 5 Days                 ED Treatment:   Medication Administration from 12/29/2020 0117 to 12/29/2020 1928       Date/Time Order Dose Route Action     12/29/2020 0841 ascorbic acid (VITAMIN C) tablet 1,000 mg 1,000 mg Oral Given     12/29/2020 0841 cholecalciferol (VITAMIN D3) tablet 2,000 Units 2,000 Units Oral Given     12/29/2020 0842 diltiazem (CARDIZEM CD) 24 hr capsule 180 mg 180 mg Oral Given     12/29/2020 0841 ferrous sulfate tablet 325 mg 325 mg Oral Given     12/29/2020 0842 fluticasone-vilanterol (BREO ELLIPTA) 100-25 mcg/inh inhaler 1 puff 1 puff Inhalation Given     96/64/9035 0798 folic acid (FOLVITE) tablet 1 mg 1 mg Oral Given     12/29/2020 0841 loratadine (CLARITIN) tablet 10 mg 10 mg Oral Given     12/29/2020 0841 losartan (COZAAR) tablet 50 mg 50 mg Oral Given     12/29/2020 0841 pantoprazole (PROTONIX) EC tablet 40 mg 40 mg Oral Given     12/29/2020 0841 thiamine (VITAMIN B1) tablet 100 mg 100 mg Oral Given     12/29/2020 0841 zinc sulfate (ZINCATE) capsule 220 mg 220 mg Oral Given     12/29/2020 0636 ceftriaxone (ROCEPHIN) 1 g/50 mL in dextrose IVPB 1,000 mg Intravenous New Bag     12/29/2020 0843 doxycycline hyclate (VIBRAMYCIN) capsule 100 mg 100 mg Oral Given     12/29/2020 0842 enoxaparin (LOVENOX) subcutaneous injection 40 mg 40 mg Subcutaneous Given     12/29/2020 0841 saccharomyces boulardii (FLORASTOR) capsule 250 mg 250 mg Oral Given     12/29/2020 1212 remdesivir (Veklury) 200 mg in sodium chloride 0 9 % 250 mL IVPB 200 mg Intravenous New Bag        Past Medical History:   Diagnosis Date    Arrhythmia     Diabetes mellitus ([facility] [address] )     History of echocardiogram 11/14/2017    showed EF of 50-55 percentWith moderate LVH and left ventricle diastolic dysfunction  Left atrium was moderately enlarged  Trace MR noted   History of Holter monitoring 11/21/2017    showed baseline rhythm of sinus origin with an average heart it of 61 bpm  The lowest heart rate was 49 and the highest heart rate was 10 8 bpm  There were rare single VPCs, and frequent PACs representing 3 2% of total beats  There were several episodes of sinus arrhythmias with sinus bradycardia and heart rate ranging from 40-90 bpm  No sustained dysrhythmias, or pauses noted   The patient did not    Hypertension     Obese      Present on Admission:   Pneumonia due to COVID-19 virus   Alcohol abuse   Type 2 diabetes mellitus (HCC)   Benign essential HTN   Anemia   Thrombocytopenia (HCC)   Insomnia   Elevated troponin   Chest pressure      Admitting Diagnosis: Pneumonia due to COVID-19 virus [U07 1, J12 89]  COVID-19 [U07 1]     Age/Sex: 77 y o  male     Admission Orders:    ascorbic acid, 1,000 mg, Oral, Daily  cefTRIAXone, 1,000 mg, Intravenous, Q24H  cholecalciferol, 2,000 Units, Oral, Daily  diltiazem, 180 mg, Oral, Daily  doxycycline hyclate, 100 mg, Oral, Q12H LEEANNE  enoxaparin, 40 mg, Subcutaneous, Daily  ferrous sulfate, 325 mg, Oral, Daily With Breakfast  fluticasone-vilanterol, 1 puff, Inhalation, Daily  folic acid, 1 mg, Oral, Daily  insulin lispro, 1-6 Units, Subcutaneous, TID AC  insulin lispro, 1-6 Units, Subcutaneous, HS  loratadine, 10 mg, Oral, Daily  losartan, 50 mg, Oral, Daily  pantoprazole, 40 mg, Oral, Daily  remdesivir, 100 mg, Intravenous, Q24H  saccharomyces boulardii, 250 mg, Oral, BID  thiamine, 100 mg, Oral, Daily  zinc sulfate, 220 mg, Oral, Daily    remdesivir (Veklury) 100 mg in sodium chloride 0 9 % 250 mL IVPB   Dose: 100 mg  Freq: Every 24 hours Route: IV  Indications of Use: INFECTION CAUSED BY 2019 NOVEL CORONAVIRUS  Start: 12/30/20 1115 End: 01/03/21 1114      Continuous IV Infusions:     PRN Meds:  acetaminophen, 975 mg, Oral, Q6H PRN  albuterol, 2 puff, Inhalation, Q4H PRN  calcium carbonate, 1,000 mg, Oral, Daily PRN  HYDROcodone-homatropine, 5 mL, Oral, Q4H PRN  melatonin, 6 mg, Oral, HS PRN  ondansetron, 4 mg, Intravenous, Q6H PRN        None    Network Utilization Review Department  ATTENTION: Please call with any questions or concerns to [phone] and carefully listen to the prompts so that you are directed to the right person  All voicemails are confidential   Suellen Leroy all requests for admission clinical reviews, approved or denied determinations and any other requests to dedicated fax number below belonging to the campus where the patient is receiving treatment   List of dedicated fax numbers for the Facilities:  [address] DENIALS (Administrative/Medical Necessity) [phone]   [address] (Maternity/NICU/Pediatrics) [phone]   [address] [address] [facility] [address] (Reese Alstrom) [address] [facility] [address] [place] [address] [address] [phone]   [place] [address] [phone]

## 2020-12-30 NOTE — ASSESSMENT & PLAN NOTE
· Initial troponin slightly elevated at 0 05 as above  · Downtrended 0 04 x 2 now  · May be troponin leak 2/2 viral infection  · Currently asymptomatic

## 2020-12-30 NOTE — ASSESSMENT & PLAN NOTE
· Was recently admitted from 12/25-12/27 for COVID PNA  States since getting home has had increased coughing and SOB  ALso noted he began having chest pressure on day of admission  (+) diarrhea x 1 day  · Was discharged home on Duracef  Procalcitonin prior to discharge was 1 24  Will recheck this am   · CXR reveals worsening infiltrates  · Procalcitonin noted elevated  · Will continue on Rocephin and Doxycycline  · Continue remdesivir treatment as well  · Continue mild COVID-19 treatment protocol    · Incentive spirometry  · Respiratory protocol  · Hycodan PRN  · Maintain precautions

## 2020-12-30 NOTE — ASSESSMENT & PLAN NOTE
· States has not had any alcohol since 12/24     · Denies any withdrawal symptoms  · CIWA 0  · Continue folic acid and thiamine

## 2020-12-30 NOTE — PLAN OF CARE
Problem: PAIN - ADULT  Goal: Verbalizes/displays adequate comfort level or baseline comfort level  Description: Interventions:  - Encourage patient to monitor pain and request assistance  - Assess pain using appropriate pain scale  - Administer analgesics based on type and severity of pain and evaluate response  - Implement non-pharmacological measures as appropriate and evaluate response  - Consider cultural and social influences on pain and pain management  - Notify physician/advanced practitioner if interventions unsuccessful or patient reports new pain  Outcome: Progressing     Problem: INFECTION - ADULT  Goal: Absence or prevention of progression during hospitalization  Description: INTERVENTIONS:  - Assess and monitor for signs and symptoms of infection  - Monitor lab/diagnostic results  - Monitor all insertion sites, i e  indwelling lines, tubes, and drains  - Monitor endotracheal if appropriate and nasal secretions for changes in amount and color  - [place] appropriate cooling/warming therapies per order  - Administer medications as ordered  - Instruct and encourage patient and family to use good hand hygiene technique  - Identify and instruct in appropriate isolation precautions for identified infection/condition  Outcome: Progressing  Goal: Absence of fever/infection during neutropenic period  Description: INTERVENTIONS:  - Monitor WBC    Outcome: Progressing     Problem: SAFETY ADULT  Goal: Patient will remain free of falls  Description: INTERVENTIONS:  - Assess patient frequently for physical needs  -  Identify cognitive and physical deficits and behaviors that affect risk of falls    -  [place] fall precautions as indicated by assessment   - Educate patient/family on patient safety including physical limitations  - Instruct patient to call for assistance with activity based on assessment  - Modify environment to reduce risk of injury  - Consider OT/PT consult to assist with strengthening/mobility  Outcome: Progressing  Goal: Maintain or return to baseline ADL function  Description: INTERVENTIONS:  -  Assess patient's ability to carry out ADLs; assess patient's baseline for ADL function and identify physical deficits which impact ability to perform ADLs (bathing, care of mouth/teeth, toileting, grooming, dressing, etc )  - Assess/evaluate cause of self-care deficits   - Assess range of motion  - Assess patient's mobility; develop plan if impaired  - Assess patient's need for assistive devices and provide as appropriate  - Encourage maximum independence but intervene and supervise when necessary  - Involve family in performance of ADLs  - Assess for home care needs following discharge   - Consider OT consult to assist with ADL evaluation and planning for discharge  - Provide patient education as appropriate  Outcome: Progressing  Goal: Maintain or return mobility status to optimal level  Description: INTERVENTIONS:  - Assess patient's baseline mobility status (ambulation, transfers, stairs, etc )    - Identify cognitive and physical deficits and behaviors that affect mobility  - Identify mobility aids required to assist with transfers and/or ambulation (gait belt, sit-to-stand, lift, walker, cane, etc )  - [place] fall precautions as indicated by assessment  - Record patient progress and toleration of activity level on Mobility SBAR; progress patient to next Phase/Stage  - Instruct patient to call for assistance with activity based on assessment  - Consider rehabilitation consult to assist with strengthening/weightbearing, etc   Outcome: Progressing     Problem: DISCHARGE PLANNING  Goal: Discharge to home or other facility with appropriate resources  Description: INTERVENTIONS:  - Identify barriers to discharge w/patient and caregiver  - Arrange for needed discharge resources and transportation as appropriate  - Identify discharge learning needs (meds, wound care, etc )  - Arrange for interpretive services to assist at discharge as needed  - Refer to Case Management Department for coordinating discharge planning if the patient needs post-hospital services based on physician/advanced practitioner order or complex needs related to functional status, cognitive ability, or social support system  Outcome: Progressing     Problem: Knowledge Deficit  Goal: Patient/family/caregiver demonstrates understanding of disease process, treatment plan, medications, and discharge instructions  Description: Complete learning assessment and assess knowledge base    Interventions:  - Provide teaching at level of understanding  - Provide teaching via preferred learning methods  Outcome: Progressing     Problem: CARDIOVASCULAR - ADULT  Goal: Maintains optimal cardiac output and hemodynamic stability  Description: INTERVENTIONS:  - Monitor I/O, vital signs and rhythm  - Monitor for S/S and trends of decreased cardiac output  - Administer and titrate ordered vasoactive medications to optimize hemodynamic stability  - Assess quality of pulses, skin color and temperature  - Assess for signs of decreased coronary artery perfusion  - Instruct patient to report change in severity of symptoms  Outcome: Progressing  Goal: Absence of cardiac dysrhythmias or at baseline rhythm  Description: INTERVENTIONS:  - Continuous cardiac monitoring, vital signs, obtain 12 lead EKG if ordered  - Administer antiarrhythmic and heart rate control medications as ordered  - Monitor electrolytes and administer replacement therapy as ordered  Outcome: Progressing     Problem: RESPIRATORY - ADULT  Goal: Achieves optimal ventilation and oxygenation  Description: INTERVENTIONS:  - Assess for changes in respiratory status  - Assess for changes in mentation and behavior  - Position to facilitate oxygenation and minimize respiratory effort  - Oxygen administered by appropriate delivery if ordered  - Initiate smoking cessation education as indicated  - Encourage broncho-pulmonary hygiene including cough, deep breathe, Incentive Spirometry  - Assess the need for suctioning and aspirate as needed  - Assess and instruct to report SOB or any respiratory difficulty  - Respiratory Therapy support as indicated  Outcome: Progressing

## 2020-12-30 NOTE — ASSESSMENT & PLAN NOTE
· Report chest pressure beginning on day of admission  States pain does worsen with coughing  (+) COVID as above  · Initial troponin 0 05 x2 subsequently downtrended 0 04 x 2  · Currently asymptomatic

## 2020-12-30 NOTE — ASSESSMENT & PLAN NOTE
Lab Results   Component Value Date    HGBA1C 9 6 (H) 12/26/2020       Recent Labs     12/30/20  0642 12/30/20  1030 12/30/20  1117 12/30/20  1601   POCGLU 77 181* 168* 180*       Blood Sugar Average: Last 72 hrs:  (P) 622 [id number]   · Hold home dose Amaryl and Glyxembi while inpt  · FSBS AC & HS w SSI  · Diabetic diet

## 2020-12-30 NOTE — PROGRESS NOTES
Progress Note Aileen Aj 1955, 77 y o  male MRN: [id number]    Unit/Bed#: -Mary Encounter: [id number]    Primary Care Provider: Antonio Preciado MD   Date and time admitted to hospital: 12/29/2020  1:38 AM        * Pneumonia due to COVID-19 virus  Assessment & Plan  · Was recently admitted from 12/25-12/27 for COVID PNA  States since getting home has had increased coughing and SOB  ALso noted he began having chest pressure on day of admission  (+) diarrhea x 1 day  · Was discharged home on Duracef  Procalcitonin prior to discharge was 1 24  Will recheck this am   · CXR reveals worsening infiltrates  · Procalcitonin noted elevated  · Will continue on Rocephin and Doxycycline  · Continue remdesivir treatment as well  · Continue mild COVID-19 treatment protocol  · Incentive spirometry  · Respiratory protocol  · Hycodan PRN  · Maintain precautions    Chest pressure  Assessment & Plan  · Report chest pressure beginning on day of admission  States pain does worsen with coughing  (+) COVID as above  · Initial troponin 0 05 x2 subsequently downtrended 0 04 x 2  · Currently asymptomatic  Elevated troponin  Assessment & Plan  · Initial troponin slightly elevated at 0 05 as above  · Downtrended 0 04 x 2 now  · May be troponin leak 2/2 viral infection  · Currently asymptomatic  Insomnia  Assessment & Plan  · States has been unable to sleep 2/2 cough  · PRN Hycodan ordered  · Melatonin PRN    Thrombocytopenia (HCC)  Assessment & Plan  · Platelets stable at 94 on admission   · No s/s of bleeding  · Likely 2/2 alcoholic liver disease vs viral infection as above  · CBC in am    Anemia  Assessment & Plan  · Hemoglobin stable around 8-9 range  · No signs of active bleeding noted  · Hemodynamically stable      Benign essential HTN  Assessment & Plan  · BP adequately controlled on current regimen  · Continue home dose Losartan  · Monitor BP per unit protocol    [facility]  Assessment & Plan  Lab Results   Component Value Date    HGBA1C 9 6 (H) 2020       Recent Labs     20  0642 20  1030 20  1117 20  1601   POCGLU 77 181* 168* 180*       Blood Sugar Average: Last 72 hrs:  (P) 271 [id number]   · Hold home dose Amaryl and Glyxembi while inpt  · FSBS AC & HS w SSI  · Diabetic diet    Alcohol abuse  Assessment & Plan  · States has not had any alcohol since   · Denies any withdrawal symptoms  · CIWA 0  · Continue folic acid and thiamine        VTE Pharmacologic Prophylaxis:   Pharmacologic: Enoxaparin (Lovenox)    Patient Centered Rounds: I have performed bedside rounds with nursing staff today  Time Spent for Care: 30 minutes  More than 50% of total time spent on counseling and coordination of care as described above  Current Length of Stay: 0 day(s)    Current Patient Status: Inpatient   Certification Statement: The patient will continue to require additional inpatient hospital stay due to above  Discharge Plan: TBD    Code Status: Level 1 - Full Code      Subjective:   Noted with low-grade fever  Hemodynamically stable  O2 saturation 95% on room air  Overall reports feeling well  Denies any complaints  No other events reported  Objective:     Vitals:   Temp (24hrs), Av °F (37 8 °C), Min:98 6 °F (37 °C), Max:101 1 °F (38 4 °C)    Temp:  [98 6 °F (37 °C)-101 1 °F (38 4 °C)] 100 1 °F (37 8 °C)  HR:  [85-98] 85  Resp:  [19-20] 19  BP: (105-122)/(59-68) 122/59  SpO2:  [94 %-96 %] 95 %  Body mass index is 32 65 kg/m²  Input and Output Summary (last 24 hours):     No intake or output data in the 24 hours ending 20 1617    Physical Exam:     Physical Exam  Constitutional:       General: He is not in acute distress  Appearance: Normal appearance  He is not ill-appearing  Comments: Examined patient via video/visual observation and with Nurse's assistance and remoting in due to COVID 19 Precaution        Obese male patient not in distress  HENT:      Head: Normocephalic and atraumatic  Nose: No rhinorrhea  Mouth/Throat:      Pharynx: No oropharyngeal exudate or posterior oropharyngeal erythema  Eyes:      Extraocular Movements: Extraocular movements intact  Conjunctiva/sclera: Conjunctivae normal       Pupils: Pupils are equal, round, and reactive to light  Neck:      Musculoskeletal: Normal range of motion and neck supple  No neck rigidity  Cardiovascular:      Rate and Rhythm: Normal rate and regular rhythm  Pulses: Normal pulses  Heart sounds: Normal heart sounds  Pulmonary:      Effort: Pulmonary effort is normal  No respiratory distress  Breath sounds: Normal breath sounds  Comments: Decreased breath sounds, not in distress  Abdominal:      General: Bowel sounds are normal  There is no distension  Palpations: Abdomen is soft  Tenderness: There is no abdominal tenderness  Musculoskeletal: Normal range of motion  Neurological:      General: No focal deficit present  Mental Status: He is alert and oriented to person, place, and time  Mental status is at baseline     Psychiatric:         Mood and Affect: Mood normal          Behavior: Behavior normal          Additional Data:     Labs:    Results from last 7 days   Lab Units 12/30/20  0554   WBC Thousand/uL 3 45*   HEMOGLOBIN g/dL 9 0*   HEMATOCRIT % 32 5*   PLATELETS Thousands/uL 69*   NEUTROS PCT % 60   LYMPHS PCT % 27   MONOS PCT % 10   EOS PCT % 1     Results from last 7 days   Lab Units 12/30/20  0554   SODIUM mmol/L 135*   POTASSIUM mmol/L 3 9   CHLORIDE mmol/L 104   CO2 mmol/L 22   BUN mg/dL 14   CREATININE mg/dL 1 09   ANION GAP mmol/L 9   CALCIUM mg/dL 8 2*   ALBUMIN g/dL 2 3*   TOTAL BILIRUBIN mg/dL 1 30*   ALK PHOS U/L 212*   ALT U/L 73   AST U/L 185*   GLUCOSE RANDOM mg/dL 58*     Results from last 7 days   Lab Units 12/29/20  1112   INR  1 29*     Results from last 7 days   Lab Units 12/30/20  1601 12/30/20  1117 12/30/20  1030 12/30/20  0642 12/29/20  2127 12/29/20  1725 12/29/20  1205 12/29/20  1036 12/29/20  0827 12/27/20  1109 12/27/20  0526 12/26/20  2138   POC GLUCOSE mg/dl 180* 168* 181* 77 102 59* 93 127 45* 188* 155* 215*     Results from last 7 days   Lab Units 12/26/20  0503   HEMOGLOBIN A1C % 9 6*     Results from last 7 days   Lab Units 12/30/20  0601 12/29/20  0716 12/27/20  0532 12/26/20  0504 12/25/20  0513 12/25/20  0227   LACTIC ACID mmol/L  --   --   --   --  3 0* 3 5*   PROCALCITONIN ng/ml 1 63* 1 97* 1 24* 1 14*  --   --            * I Have Reviewed All Lab Data Listed Above  * Additional Pertinent Lab Tests Reviewed: All Labs Within Last 24 Hours Reviewed      Recent Cultures (last 7 days):     Results from last 7 days   Lab Units 12/25/20  0513 12/25/20  0227   BLOOD CULTURE  No Growth After 5 Days  No Growth After 5 Days         Last 24 Hours Medication List:   Current Facility-Administered Medications   Medication Dose Route Frequency Provider Last Rate    acetaminophen  975 mg Oral Q6H PRN Elysia Pettit PA-C      albuterol  2 puff Inhalation Q4H PRN Elysia Pettit PA-C      ascorbic acid  1,000 mg Oral Daily Elysia Pettit PA-C      calcium carbonate  1,000 mg Oral Daily PRN Elysia Pettit PA-C      cefTRIAXone  1,000 mg Intravenous Q24H MAURILIO PoloC 1,000 mg (12/30/20 0546)    cholecalciferol  2,000 Units Oral Daily Elysia Pettit PA-C      diltiazem  180 mg Oral Daily Bryan Polo      doxycycline hyclate  100 mg Oral Q12H [facility] [place] Elysia Pettit PA-C      enoxaparin  40 mg Subcutaneous Daily Elysia Pettit [place]      ferrous sulfate  325 mg Oral Daily With Breakfast Elysia Pettit PA-C      fluticasone-vilanterol  1 puff Inhalation Daily Bryan Polo      folic acid  1 mg Oral Daily Bryan Polo      HYDROcodone-homatropine  5 mL Oral Q4H PRN Elysia Pettit PA-C      insulin lispro  1-6 Units Subcutaneous TID [facility] Sneha Soto Shanika Sellers PA-C      insulin lispro  1-6 Units Subcutaneous HS [place], ABELARDO      loratadine  10 mg Oral Daily [place], [place]      los[facility]  50 [facility] [place]      melatonin  6 mg Oral HS PRN [place], ABELARDO      ondansetron  4 mg Intravenous Q6H PRN [place], ABELARDO      pantoprazole  40 mg Oral Daily [place], [place]      remdesivir  100 mg Intravenous Q24H [facility] Bella boulardii  250 mg Oral BID [place], [place]      thiamine  100 mg Oral Daily [place], [place]      zinc sulfate  220 mg Oral Daily [facility], AB[facility]          Today, Patient Was Seen By: Laith Veloz MD    ** Please Note: Dictation voice to text software may have been used in the creation of this document   **

## 2020-12-30 NOTE — ASSESSMENT & PLAN NOTE
· Hemoglobin stable around 8-9 range  · No signs of active bleeding noted  · Hemodynamically stable

## 2020-12-31 LAB
GLUCOSE SERPL-MCNC: 102 MG/DL (ref 65–140)
GLUCOSE SERPL-MCNC: 202 MG/DL (ref 65–140)
GLUCOSE SERPL-MCNC: 209 MG/DL (ref 65–140)
GLUCOSE SERPL-MCNC: 221 MG/DL (ref 65–140)
INR PPP: 1.32 (ref 0.84–1.19)
PROTHROMBIN TIME: 15.8 SECONDS (ref 11.6–14.5)

## 2020-12-31 PROCEDURE — 82948 REAGENT STRIP/BLOOD GLUCOSE: CPT

## 2020-12-31 PROCEDURE — 85610 PROTHROMBIN TIME: CPT | Performed by: STUDENT IN AN ORGANIZED HEALTH CARE EDUCATION/TRAINING PROGRAM

## 2020-12-31 PROCEDURE — 94760 N-INVAS EAR/PLS OXIMETRY 1: CPT

## 2020-12-31 PROCEDURE — 99232 SBSQ HOSP IP/OBS MODERATE 35: CPT | Performed by: STUDENT IN AN ORGANIZED HEALTH CARE EDUCATION/TRAINING PROGRAM

## 2020-12-31 RX ORDER — TRIAMTERENE AND HYDROCHLOROTHIAZIDE 37.5; 25 MG/1; MG/1
1 TABLET ORAL DAILY
Status: DISCONTINUED | OUTPATIENT
Start: 2020-12-31 | End: 2021-01-03 | Stop reason: HOSPADM

## 2020-12-31 RX ORDER — TRIAMTERENE AND HYDROCHLOROTHIAZIDE 37.5; 25 MG/1; MG/1
1 TABLET ORAL DAILY
COMMUNITY

## 2020-12-31 RX ADMIN — FOLIC ACID 1 MG: 1 TABLET ORAL at 09:00

## 2020-12-31 RX ADMIN — DILTIAZEM HYDROCHLORIDE 180 MG: 180 CAPSULE, COATED, EXTENDED RELEASE ORAL at 09:00

## 2020-12-31 RX ADMIN — LORATADINE 10 MG: 10 TABLET ORAL at 08:59

## 2020-12-31 RX ADMIN — Medication 250 MG: at 08:59

## 2020-12-31 RX ADMIN — Medication 250 MG: at 17:06

## 2020-12-31 RX ADMIN — DOXYCYCLINE 100 MG: 100 CAPSULE ORAL at 09:00

## 2020-12-31 RX ADMIN — INSULIN LISPRO 2 UNITS: 100 INJECTION, SOLUTION INTRAVENOUS; SUBCUTANEOUS at 17:06

## 2020-12-31 RX ADMIN — CEFTRIAXONE SODIUM 1000 MG: 10 INJECTION, POWDER, FOR SOLUTION INTRAVENOUS at 06:20

## 2020-12-31 RX ADMIN — HYDROCODONE BITARTRATE AND HOMATROPINE METHYLBROMIDE 5 ML: 5; 1.5 SYRUP ORAL at 13:49

## 2020-12-31 RX ADMIN — TRIAMTERENE AND HYDROCHLOROTHIAZIDE 1 TABLET: 37.5; 25 TABLET ORAL at 13:49

## 2020-12-31 RX ADMIN — ACETAMINOPHEN 975 MG: 325 TABLET, FILM COATED ORAL at 07:17

## 2020-12-31 RX ADMIN — Medication 2000 UNITS: at 09:00

## 2020-12-31 RX ADMIN — LOSARTAN POTASSIUM 50 MG: 50 TABLET, FILM COATED ORAL at 08:59

## 2020-12-31 RX ADMIN — REMDESIVIR 100 MG: 100 INJECTION, POWDER, LYOPHILIZED, FOR SOLUTION INTRAVENOUS at 12:44

## 2020-12-31 RX ADMIN — INSULIN LISPRO 2 UNITS: 100 INJECTION, SOLUTION INTRAVENOUS; SUBCUTANEOUS at 21:24

## 2020-12-31 RX ADMIN — THIAMINE HCL TAB 100 MG 100 MG: 100 TAB at 09:00

## 2020-12-31 RX ADMIN — DOXYCYCLINE 100 MG: 100 CAPSULE ORAL at 21:24

## 2020-12-31 RX ADMIN — ENOXAPARIN SODIUM 30 MG: 30 INJECTION SUBCUTANEOUS at 21:24

## 2020-12-31 RX ADMIN — FLUTICASONE FUROATE AND VILANTEROL TRIFENATATE 1 PUFF: 100; 25 POWDER RESPIRATORY (INHALATION) at 09:05

## 2020-12-31 RX ADMIN — OXYCODONE HYDROCHLORIDE AND ACETAMINOPHEN 1000 MG: 500 TABLET ORAL at 08:59

## 2020-12-31 RX ADMIN — PANTOPRAZOLE SODIUM 40 MG: 40 TABLET, DELAYED RELEASE ORAL at 09:00

## 2020-12-31 RX ADMIN — HYDROCODONE BITARTRATE AND HOMATROPINE METHYLBROMIDE 5 ML: 5; 1.5 SYRUP ORAL at 09:00

## 2020-12-31 RX ADMIN — ZINC SULFATE 220 MG (50 MG) CAPSULE 220 MG: CAPSULE at 08:59

## 2020-12-31 RX ADMIN — ENOXAPARIN SODIUM 40 MG: 40 INJECTION SUBCUTANEOUS at 09:00

## 2020-12-31 RX ADMIN — FERROUS SULFATE TAB 325 MG (65 MG ELEMENTAL FE) 325 MG: 325 (65 FE) TAB at 09:00

## 2020-12-31 RX ADMIN — HYDROCODONE BITARTRATE AND HOMATROPINE METHYLBROMIDE 5 ML: 5; 1.5 SYRUP ORAL at 22:55

## 2020-12-31 RX ADMIN — INSULIN LISPRO 2 UNITS: 100 INJECTION, SOLUTION INTRAVENOUS; SUBCUTANEOUS at 12:45

## 2020-12-31 NOTE — CASE MANAGEMENT
CM called and s/w pt wife, Michelle Tesfaye for CM assessment  Per Ghada Govea, pt lives in [place] with her in a ranch style home with 1 shiv with no railing  Per wife, pt has no difficulty with steps  Per wife pt does not use any dme  Per wife pt is independent with all adl's  Per wife pt has no hhc, no rehab hx, and no mh hx  Per wife, pt uses [facility] in [place]  Per wife, pt PCP is Dr Rajiv Quispe  Per wife, prior to admission, pt consumed etoh nightly: 2-3 glasses of wine, and rum/coke  Wife states pt told her he "cannot drink anymore"  Per wife, she is pt health care agent  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, pt is a self-employed farmer  Per wife, pt does not drive, states she drives pt to all appts  Per wife, she will transport pt home at dc  CM reviewed discharge planning process including the following: identifying help at home, patient preference for discharge planning needs, pharmacy preference, and availability of treatment team to discuss questions or concerns patient and/or family may have regarding understanding medications and recognizing signs and symptoms once discharged  CM also encouraged patient to follow up with all recommended appointments after discharge  Patient advised of importance for patient and family to participate in managing patients medical well being  CM discussed possibility of STR rec for pt  Per wife Ghada Govea, "you will have to ask him"  CM dept to follow pt through dc

## 2020-12-31 NOTE — ASSESSMENT & PLAN NOTE
Lab Results   Component Value Date    HGBA1C 9 6 (H) 12/26/2020       Recent Labs     12/30/20  2138 12/31/20  0536 12/31/20  1108 12/31/20  1530   POCGLU 169* 102 221* 209*       Blood Sugar Average: Last 72 hrs:  (P) 133 [id number]   · Hold home dose Amaryl and Glyxembi while inpt  · FSBS AC & HS w SSI  · Diabetic diet

## 2020-12-31 NOTE — CASE MANAGEMENT
CM attempted to call pt room for CM assessment  Pt did not answer phone  CM dept to follow pt through dc

## 2020-12-31 NOTE — PLAN OF CARE
Problem: PAIN - ADULT  Goal: Verbalizes/displays adequate comfort level or baseline comfort level  Description: Interventions:  - Encourage patient to monitor pain and request assistance  - Assess pain using appropriate pain scale  - Administer analgesics based on type and severity of pain and evaluate response  - Implement non-pharmacological measures as appropriate and evaluate response  - Consider cultural and social influences on pain and pain management  - Notify physician/advanced practitioner if interventions unsuccessful or patient reports new pain  Outcome: Progressing     Problem: INFECTION - ADULT  Goal: Absence or prevention of progression during hospitalization  Description: INTERVENTIONS:  - Assess and monitor for signs and symptoms of infection  - Monitor lab/diagnostic results  - Monitor all insertion sites, i e  indwelling lines, tubes, and drains  - Monitor endotracheal if appropriate and nasal secretions for changes in amount and color  - [place] appropriate cooling/warming therapies per order  - Administer medications as ordered  - Instruct and encourage patient and family to use good hand hygiene technique  - Identify and instruct in appropriate isolation precautions for identified infection/condition  Outcome: Progressing  Goal: Absence of fever/infection during neutropenic period  Description: INTERVENTIONS:  - Monitor WBC    Outcome: Progressing     Problem: SAFETY ADULT  Goal: Patient will remain free of falls  Description: INTERVENTIONS:  - Assess patient frequently for physical needs  -  Identify cognitive and physical deficits and behaviors that affect risk of falls    -  [place] fall precautions as indicated by assessment   - Educate patient/family on patient safety including physical limitations  - Instruct patient to call for assistance with activity based on assessment  - Modify environment to reduce risk of injury  - Consider OT/PT consult to assist with strengthening/mobility  Outcome: Progressing  Goal: Maintain or return to baseline ADL function  Description: INTERVENTIONS:  -  Assess patient's ability to carry out ADLs; assess patient's baseline for ADL function and identify physical deficits which impact ability to perform ADLs (bathing, care of mouth/teeth, toileting, grooming, dressing, etc )  - Assess/evaluate cause of self-care deficits   - Assess range of motion  - Assess patient's mobility; develop plan if impaired  - Assess patient's need for assistive devices and provide as appropriate  - Encourage maximum independence but intervene and supervise when necessary  - Involve family in performance of ADLs  - Assess for home care needs following discharge   - Consider OT consult to assist with ADL evaluation and planning for discharge  - Provide patient education as appropriate  Outcome: Progressing  Goal: Maintain or return mobility status to optimal level  Description: INTERVENTIONS:  - Assess patient's baseline mobility status (ambulation, transfers, stairs, etc )    - Identify cognitive and physical deficits and behaviors that affect mobility  - Identify mobility aids required to assist with transfers and/or ambulation (gait belt, sit-to-stand, lift, walker, cane, etc )  - [place] fall precautions as indicated by assessment  - Record patient progress and toleration of activity level on Mobility SBAR; progress patient to next Phase/Stage  - Instruct patient to call for assistance with activity based on assessment  - Consider rehabilitation consult to assist with strengthening/weightbearing, etc   Outcome: Progressing     Problem: DISCHARGE PLANNING  Goal: Discharge to home or other facility with appropriate resources  Description: INTERVENTIONS:  - Identify barriers to discharge w/patient and caregiver  - Arrange for needed discharge resources and transportation as appropriate  - Identify discharge learning needs (meds, wound care, etc )  - Arrange for interpretive services to assist at discharge as needed  - Refer to Case Management Department for coordinating discharge planning if the patient needs post-hospital services based on physician/advanced practitioner order or complex needs related to functional status, cognitive ability, or social support system  Outcome: Progressing     Problem: Knowledge Deficit  Goal: Patient/family/caregiver demonstrates understanding of disease process, treatment plan, medications, and discharge instructions  Description: Complete learning assessment and assess knowledge base    Interventions:  - Provide teaching at level of understanding  - Provide teaching via preferred learning methods  Outcome: Progressing     Problem: CARDIOVASCULAR - ADULT  Goal: Maintains optimal cardiac output and hemodynamic stability  Description: INTERVENTIONS:  - Monitor I/O, vital signs and rhythm  - Monitor for S/S and trends of decreased cardiac output  - Administer and titrate ordered vasoactive medications to optimize hemodynamic stability  - Assess quality of pulses, skin color and temperature  - Assess for signs of decreased coronary artery perfusion  - Instruct patient to report change in severity of symptoms  Outcome: Progressing  Goal: Absence of cardiac dysrhythmias or at baseline rhythm  Description: INTERVENTIONS:  - Continuous cardiac monitoring, vital signs, obtain 12 lead EKG if ordered  - Administer antiarrhythmic and heart rate control medications as ordered  - Monitor electrolytes and administer replacement therapy as ordered  Outcome: Progressing     Problem: RESPIRATORY - ADULT  Goal: Achieves optimal ventilation and oxygenation  Description: INTERVENTIONS:  - Assess for changes in respiratory status  - Assess for changes in mentation and behavior  - Position to facilitate oxygenation and minimize respiratory effort  - Oxygen administered by appropriate delivery if ordered  - Initiate smoking cessation education as indicated  - Encourage broncho-pulmonary hygiene including cough, deep breathe, Incentive Spirometry  - Assess the need for suctioning and aspirate as needed  - Assess and instruct to report SOB or any respiratory difficulty  - Respiratory Therapy support as indicated  Outcome: Progressing     Problem: HEMATOLOGIC - ADULT  Goal: Maintains hematologic stability  Description: INTERVENTIONS  - Assess for signs and symptoms of bleeding or hemorrhage  - Monitor labs  - Administer supportive blood products/factors as ordered and appropriate  Outcome: Progressing

## 2020-12-31 NOTE — ASSESSMENT & PLAN NOTE
Present admission with history of alcoholic cirrhosis  MELD score 11  AFP within normal limits  Hepatitis panel negative  Not encephalopathic  Recommended close follow-up with Gastroenterology outpatient  Patient is agreeable plan

## 2020-12-31 NOTE — PROGRESS NOTES
Progress Note Linus Kwan 1955, 77 y o  male MRN: [id number]    Unit/Bed#: -01 Encounter: [id number]    Primary Care Provider: Kylie Jaime MD   Date and time admitted to hospital: 12/29/2020  1:38 AM        * Pneumonia due to COVID-19 virus  Assessment & Plan  · Was recently admitted from 12/25-12/27 for COVID PNA  States since getting home has had increased coughing and SOB  ALso noted he began having chest pressure on day of admission  (+) diarrhea x 1 day  · Was discharged home on Duracef  Procalcitonin prior to discharge was 1 24  Will recheck this am   · CXR reveals worsening infiltrates  · Procalcitonin noted elevated  · Will continue on Rocephin and Doxycycline  · Continue remdesivir treatment as well  · Continue mild COVID-19 treatment protocol  · Incentive spirometry  · Respiratory protocol  · Hycodan PRN  · Maintain precautions    Chest pressure  Assessment & Plan  · Report chest pressure beginning on day of admission  States pain does worsen with coughing  (+) COVID as above  · Initial troponin 0 05 x2 subsequently downtrended 0 04 x 2  · Currently asymptomatic  Elevated troponin  Assessment & Plan  · Initial troponin slightly elevated at 0 05 as above  · Downtrended 0 04 x 2 now  · May be troponin leak 2/2 viral infection  · Currently asymptomatic  Insomnia  Assessment & Plan  · States has been unable to sleep 2/2 cough  · PRN Hycodan ordered  · Melatonin PRN    Thrombocytopenia (HCC)  Assessment & Plan  · Platelets stable at 94 on admission   · No s/s of bleeding  · Likely 2/2 alcoholic liver disease vs viral infection as above  · CBC in am    Anemia  Assessment & Plan  · Hemoglobin stable around 8-9 range  · No signs of active bleeding noted  · Hemodynamically stable  Alcoholic cirrhosis ([facility] [address] )  Assessment & Plan  Present admission with history of alcoholic cirrhosis  MELD score 11  AFP within normal limits  Hepatitis panel negative    Not encephalopathic  Recommended close follow-up with Gastroenterology outpatient  Patient is agreeable plan  Benign essential HTN  Assessment & Plan  · BP adequately controlled on current regimen  · Continue home dose Losartan  · Monitor BP per unit protocol    Type 2 diabetes mellitus [facility])  Assessment & Plan  Lab Results   Component Value Date    HGBA1C 9 6 (H) 2020       Recent Labs     20  2138 20  0536 20  1108 20  1530   POCGLU 169* 102 221* 209*       Blood Sugar Average: Last 72 hrs:  (P) 133 [id number]   · Hold home dose Amaryl and Glyxembi while inpt  · FSBS AC & HS w SSI  · Diabetic diet    Alcohol abuse  Assessment & Plan  · States has not had any alcohol since   · Denies any withdrawal symptoms  · CIWA 0  · Continue folic acid and thiamine        VTE Pharmacologic Prophylaxis:   Pharmacologic: Enoxaparin (Lovenox)    Patient Centered Rounds: I have performed bedside rounds with nursing staff today  Education and Discussions with Family / Patient:  Spoke with wife over the phone at length  Time Spent for Care: 30 minutes  More than 50% of total time spent on counseling and coordination of care as described above  Current Length of Stay: 1 day(s)    Current Patient Status: Inpatient   Certification Statement: The patient will continue to require additional inpatient hospital stay due to Above    Discharge Plan:  48 hours    Code Status: Level 1 - Full Code      Subjective:   Noted with intermittent fever, currently afebrile, hemodynamically stable  O2 saturation 96-97% on room air  Denies fever, chills, nausea, vomiting, any new complaints  No other events reported  Reports that he is not feeling ready enough to go home      Objective:     Vitals:   Temp (24hrs), Av 7 °F (37 6 °C), Min:98 3 °F (36 8 °C), Max:101 2 °F (38 4 °C)    Temp:  [98 3 °F (36 8 °C)-101 2 °F (38 4 °C)] 98 3 °F (36 8 °C)  HR:  [79-96] 83  Resp:  [18-20] 18  BP: (108-124)/(56-66) 108/56  SpO2:  [91 %-97 %] 97 %  Body mass index is 34 1 kg/m²  Input and Output Summary (last 24 hours): Intake/Output Summary (Last 24 hours) at 12/31/2020 1556  Last data filed at 12/31/2020 1359  Gross per 24 hour   Intake 860 ml   Output --   Net 860 ml       Physical Exam:     Physical Exam  Constitutional:       General: He is not in acute distress  Appearance: Normal appearance  He is not ill-appearing  Comments: Examined patient via video/visual observation and with Nurse's assistance and remoting in due to COVID 19 Precaution  Obese male patient not in distress  HENT:      Head: Normocephalic and atraumatic  Nose: No rhinorrhea  Mouth/Throat:      Pharynx: No oropharyngeal exudate or posterior oropharyngeal erythema  Eyes:      Extraocular Movements: Extraocular movements intact  Conjunctiva/sclera: Conjunctivae normal       Pupils: Pupils are equal, round, and reactive to light  Neck:      Musculoskeletal: Normal range of motion and neck supple  No neck rigidity  Cardiovascular:      Rate and Rhythm: Normal rate and regular rhythm  Pulses: Normal pulses  Heart sounds: Normal heart sounds  Pulmonary:      Effort: Pulmonary effort is normal  No respiratory distress  Breath sounds: Normal breath sounds  Comments: Decreased breath sounds, not in distress  Abdominal:      General: Bowel sounds are normal  There is no distension  Palpations: Abdomen is soft  Tenderness: There is no abdominal tenderness  Musculoskeletal: Normal range of motion  Neurological:      General: No focal deficit present  Mental Status: He is alert and oriented to person, place, and time  Mental status is at baseline     Psychiatric:         Mood and Affect: Mood normal          Behavior: Behavior normal            Additional Data:     Labs:    Results from last 7 days   Lab Units 12/30/20  0554   WBC Thousand/uL 3 45*   HEMOGLOBIN g/dL 9 0*   HEMATOCRIT % 32 5*   PLATELETS Thousands/uL 69*   NEUTROS PCT % 60   LYMPHS PCT % 27   MONOS PCT % 10   EOS PCT % 1     Results from last 7 days   Lab Units 12/30/20  0554   SODIUM mmol/L 135*   POTASSIUM mmol/L 3 9   CHLORIDE mmol/L 104   CO2 mmol/L 22   BUN mg/dL 14   CREATININE mg/dL 1 09   ANION GAP mmol/L 9   CALCIUM mg/dL 8 2*   ALBUMIN g/dL 2 3*   TOTAL BILIRUBIN mg/dL 1 30*   ALK PHOS U/L 212*   ALT U/L 73   AST U/L 185*   GLUCOSE RANDOM mg/dL 58*     Results from last 7 days   Lab Units 12/31/20  0537   INR  1 32*     Results from last 7 days   Lab Units 12/31/20  1530 12/31/20  1108 12/31/20  0536 12/30/20  2138 12/30/20  1601 12/30/20  1117 12/30/20  1030 12/30/20  0642 12/29/20  2127 12/29/20  1725 12/29/20  1205 12/29/20  1036   POC GLUCOSE mg/dl 209* 221* 102 169* 180* 168* 181* 77 102 59* 93 127     Results from last 7 days   Lab Units 12/26/20  0503   HEMOGLOBIN A1C % 9 6*     Results from last 7 days   Lab Units 12/30/20  0601 12/29/20  0716 12/27/20  0532 12/26/20  0504 12/25/20  0513 12/25/20  0227   LACTIC ACID mmol/L  --   --   --   --  3 0* 3 5*   PROCALCITONIN ng/ml 1 63* 1 97* 1 24* 1 14*  --   --            * I Have Reviewed All Lab Data Listed Above  * Additional Pertinent Lab Tests Reviewed: All Labs Within Last 24 Hours Reviewed      Recent Cultures (last 7 days):     Results from last 7 days   Lab Units 12/25/20  0513 12/25/20  0227   BLOOD CULTURE  No Growth After 5 Days  No Growth After 5 Days         Last 24 Hours Medication List:   Current Facility-Administered Medications   Medication Dose Route Frequency Provider Last Rate    acetaminophen  975 mg Oral Q6H PRN Emerson Hernández PA-C      albuterol  2 puff Inhalation Q4H PRN Emerson Hernández PA-C      ascorbic acid  1,000 mg Oral Daily Emerson Hernández PA-C      calcium carbonate  1,000 mg Oral Daily PRN Emerson Hernández PA-C      cefTRIAXone  1,000 mg Intravenous Q24H Emerson Hernández PA-C 1,000 mg (12/31/20 2311)  cholecalciferol  2,000 Units Oral Daily Beryle Brittle, PA-C      diltiazem  180 mg Oral Daily Beryle Brittle, [place]      doxycycline hyclate  100 mg Oral Q12H [facility] Beryle Brittle, PA-C      enoxaparin  30 mg Subcutaneous Q12H [facility] Laureen Johnston MD      ferrous sulfate  325 mg Oral Daily With Breakfast Beryle Brittle, PA-C      fluticasone-vilanterol  1 puff Inhalation Daily Beryle Brittle, [place]      folic acid  1 mg Oral Daily Beryle Brittle, [place]      HYDROcodone-homatropine  5 mL Oral Q4H PRN Beryle Brittle, PA-C      insulin lispro  1-6 Units Subcutaneous TID [facility] Beryle Brittle, [place]      insulin lispro  1-6 Units Subcutaneous HS Beryle Brittle, [place]      loratadine  10 mg Oral Daily Beryle Brittle, [place]      losartan  50 mg Oral Daily Beryle Brittle, [place]      melatonin  6 mg Oral HS PRN Beryle Brittle, PA-C      ondansetron  4 mg Intravenous Q6H PRN Beryle BritABELARDO sena      pantoprazole  40 mg Oral Daily Beryle Brittle, [place]      remdesivir  100 mg Intravenous Q24H Laureen Johnston MD      saccharomyces boulardii  250 mg Oral BID Beryle Brittle, [place]      thiamine  100 mg Oral Daily Beryle Brittle, [place]      triamterene-hydrochlorothiazide  1 tablet Oral Daily Laureen Johnston MD      zinc sulfate  220 mg Oral Daily Beryle Brittle, PA-C          Today, Patient Was Seen By: Kathryn Portillo MD    ** Please Note: Dictation voice to text software may have been used in the creation of this document   **

## 2021-01-01 LAB
ALBUMIN SERPL BCP-MCNC: 2.2 G/DL (ref 3.5–5)
ALP SERPL-CCNC: 202 U/L (ref 46–116)
ALT SERPL W P-5'-P-CCNC: 50 U/L (ref 12–78)
ANION GAP SERPL CALCULATED.3IONS-SCNC: 11 MMOL/L (ref 4–13)
ANISOCYTOSIS BLD QL SMEAR: PRESENT
AST SERPL W P-5'-P-CCNC: 82 U/L (ref 5–45)
BASOPHILS # BLD MANUAL: 0 THOUSAND/UL (ref 0–0.1)
BASOPHILS NFR MAR MANUAL: 0 % (ref 0–1)
BILIRUB SERPL-MCNC: 1.5 MG/DL (ref 0.2–1)
BUN SERPL-MCNC: 16 MG/DL (ref 5–25)
CALCIUM ALBUM COR SERPL-MCNC: 9.5 MG/DL (ref 8.3–10.1)
CALCIUM SERPL-MCNC: 8.1 MG/DL (ref 8.3–10.1)
CHLORIDE SERPL-SCNC: 100 MMOL/L (ref 100–108)
CO2 SERPL-SCNC: 22 MMOL/L (ref 21–32)
CREAT SERPL-MCNC: 1.08 MG/DL (ref 0.6–1.3)
CRP SERPL QL: 82.7 MG/L
D DIMER PPP FEU-MCNC: 1.12 UG/ML FEU
EOSINOPHIL # BLD MANUAL: 0 THOUSAND/UL (ref 0–0.4)
EOSINOPHIL NFR BLD MANUAL: 0 % (ref 0–6)
ERYTHROCYTE [DISTWIDTH] IN BLOOD BY AUTOMATED COUNT: 24.3 % (ref 11.6–15.1)
FERRITIN SERPL-MCNC: 124 NG/ML (ref 8–388)
GFR SERPL CREATININE-BSD FRML MDRD: 71 ML/MIN/1.73SQ M
GLUCOSE SERPL-MCNC: 149 MG/DL (ref 65–140)
GLUCOSE SERPL-MCNC: 175 MG/DL (ref 65–140)
GLUCOSE SERPL-MCNC: 268 MG/DL (ref 65–140)
GLUCOSE SERPL-MCNC: 268 MG/DL (ref 65–140)
GLUCOSE SERPL-MCNC: 285 MG/DL (ref 65–140)
HCT VFR BLD AUTO: 34.6 % (ref 36.5–49.3)
HGB BLD-MCNC: 9 G/DL (ref 12–17)
HYPERCHROMIA BLD QL SMEAR: PRESENT
INR PPP: 1.29 (ref 0.84–1.19)
LG PLATELETS BLD QL SMEAR: PRESENT
LYMPHOCYTES # BLD AUTO: 1.13 THOUSAND/UL (ref 0.6–4.47)
LYMPHOCYTES # BLD AUTO: 22 % (ref 14–44)
MCH RBC QN AUTO: 20.5 PG (ref 26.8–34.3)
MCHC RBC AUTO-ENTMCNC: 26 G/DL (ref 31.4–37.4)
MCV RBC AUTO: 79 FL (ref 82–98)
MICROCYTES BLD QL AUTO: PRESENT
MONOCYTES # BLD AUTO: 0.41 THOUSAND/UL (ref 0–1.22)
MONOCYTES NFR BLD: 8 % (ref 4–12)
NEUTROPHILS # BLD MANUAL: 3.43 THOUSAND/UL (ref 1.85–7.62)
NEUTS BAND NFR BLD MANUAL: 1 % (ref 0–8)
NEUTS SEG NFR BLD AUTO: 66 % (ref 43–75)
NRBC BLD AUTO-RTO: 0 /100 WBCS
PLATELET # BLD AUTO: 96 THOUSANDS/UL (ref 149–390)
PLATELET BLD QL SMEAR: ABNORMAL
POIKILOCYTOSIS BLD QL SMEAR: PRESENT
POTASSIUM SERPL-SCNC: 4.3 MMOL/L (ref 3.5–5.3)
PROT SERPL-MCNC: 6.3 G/DL (ref 6.4–8.2)
PROTHROMBIN TIME: 15.5 SECONDS (ref 11.6–14.5)
RBC # BLD AUTO: 4.4 MILLION/UL (ref 3.88–5.62)
SCHISTOCYTES BLD QL SMEAR: PRESENT
SODIUM SERPL-SCNC: 133 MMOL/L (ref 136–145)
TOTAL CELLS COUNTED SPEC: 100
VARIANT LYMPHS # BLD AUTO: 3 %
WBC # BLD AUTO: 5.12 THOUSAND/UL (ref 4.31–10.16)

## 2021-01-01 PROCEDURE — 86140 C-REACTIVE PROTEIN: CPT | Performed by: STUDENT IN AN ORGANIZED HEALTH CARE EDUCATION/TRAINING PROGRAM

## 2021-01-01 PROCEDURE — 94760 N-INVAS EAR/PLS OXIMETRY 1: CPT

## 2021-01-01 PROCEDURE — 99232 SBSQ HOSP IP/OBS MODERATE 35: CPT | Performed by: STUDENT IN AN ORGANIZED HEALTH CARE EDUCATION/TRAINING PROGRAM

## 2021-01-01 PROCEDURE — 85027 COMPLETE CBC AUTOMATED: CPT | Performed by: STUDENT IN AN ORGANIZED HEALTH CARE EDUCATION/TRAINING PROGRAM

## 2021-01-01 PROCEDURE — 85379 FIBRIN DEGRADATION QUANT: CPT | Performed by: STUDENT IN AN ORGANIZED HEALTH CARE EDUCATION/TRAINING PROGRAM

## 2021-01-01 PROCEDURE — 80053 COMPREHEN METABOLIC PANEL: CPT | Performed by: STUDENT IN AN ORGANIZED HEALTH CARE EDUCATION/TRAINING PROGRAM

## 2021-01-01 PROCEDURE — 82728 ASSAY OF FERRITIN: CPT | Performed by: STUDENT IN AN ORGANIZED HEALTH CARE EDUCATION/TRAINING PROGRAM

## 2021-01-01 PROCEDURE — 85007 BL SMEAR W/DIFF WBC COUNT: CPT | Performed by: STUDENT IN AN ORGANIZED HEALTH CARE EDUCATION/TRAINING PROGRAM

## 2021-01-01 PROCEDURE — 82948 REAGENT STRIP/BLOOD GLUCOSE: CPT

## 2021-01-01 PROCEDURE — 85610 PROTHROMBIN TIME: CPT | Performed by: STUDENT IN AN ORGANIZED HEALTH CARE EDUCATION/TRAINING PROGRAM

## 2021-01-01 RX ADMIN — CEFTRIAXONE SODIUM 1000 MG: 10 INJECTION, POWDER, FOR SOLUTION INTRAVENOUS at 06:03

## 2021-01-01 RX ADMIN — HYDROCODONE BITARTRATE AND HOMATROPINE METHYLBROMIDE 5 ML: 5; 1.5 SYRUP ORAL at 18:39

## 2021-01-01 RX ADMIN — Medication 2000 UNITS: at 08:13

## 2021-01-01 RX ADMIN — LOSARTAN POTASSIUM 50 MG: 50 TABLET, FILM COATED ORAL at 08:13

## 2021-01-01 RX ADMIN — ACETAMINOPHEN 975 MG: 325 TABLET, FILM COATED ORAL at 06:47

## 2021-01-01 RX ADMIN — OXYCODONE HYDROCHLORIDE AND ACETAMINOPHEN 1000 MG: 500 TABLET ORAL at 08:13

## 2021-01-01 RX ADMIN — FLUTICASONE FUROATE AND VILANTEROL TRIFENATATE 1 PUFF: 100; 25 POWDER RESPIRATORY (INHALATION) at 08:18

## 2021-01-01 RX ADMIN — LORATADINE 10 MG: 10 TABLET ORAL at 08:13

## 2021-01-01 RX ADMIN — ENOXAPARIN SODIUM 30 MG: 30 INJECTION SUBCUTANEOUS at 22:19

## 2021-01-01 RX ADMIN — DOXYCYCLINE 100 MG: 100 CAPSULE ORAL at 22:18

## 2021-01-01 RX ADMIN — TRIAMTERENE AND HYDROCHLOROTHIAZIDE 1 TABLET: 37.5; 25 TABLET ORAL at 08:13

## 2021-01-01 RX ADMIN — FOLIC ACID 1 MG: 1 TABLET ORAL at 08:13

## 2021-01-01 RX ADMIN — HYDROCODONE BITARTRATE AND HOMATROPINE METHYLBROMIDE 5 ML: 5; 1.5 SYRUP ORAL at 23:41

## 2021-01-01 RX ADMIN — ENOXAPARIN SODIUM 30 MG: 30 INJECTION SUBCUTANEOUS at 08:12

## 2021-01-01 RX ADMIN — DILTIAZEM HYDROCHLORIDE 180 MG: 180 CAPSULE, COATED, EXTENDED RELEASE ORAL at 08:14

## 2021-01-01 RX ADMIN — INSULIN LISPRO 4 UNITS: 100 INJECTION, SOLUTION INTRAVENOUS; SUBCUTANEOUS at 16:28

## 2021-01-01 RX ADMIN — FERROUS SULFATE TAB 325 MG (65 MG ELEMENTAL FE) 325 MG: 325 (65 FE) TAB at 08:13

## 2021-01-01 RX ADMIN — PANTOPRAZOLE SODIUM 40 MG: 40 TABLET, DELAYED RELEASE ORAL at 08:13

## 2021-01-01 RX ADMIN — Medication 250 MG: at 08:12

## 2021-01-01 RX ADMIN — ZINC SULFATE 220 MG (50 MG) CAPSULE 220 MG: CAPSULE at 08:12

## 2021-01-01 RX ADMIN — REMDESIVIR 100 MG: 100 INJECTION, POWDER, LYOPHILIZED, FOR SOLUTION INTRAVENOUS at 11:29

## 2021-01-01 RX ADMIN — HYDROCODONE BITARTRATE AND HOMATROPINE METHYLBROMIDE 5 ML: 5; 1.5 SYRUP ORAL at 06:18

## 2021-01-01 RX ADMIN — Medication 250 MG: at 16:23

## 2021-01-01 RX ADMIN — INSULIN LISPRO 3 UNITS: 100 INJECTION, SOLUTION INTRAVENOUS; SUBCUTANEOUS at 11:29

## 2021-01-01 RX ADMIN — THIAMINE HCL TAB 100 MG 100 MG: 100 TAB at 08:13

## 2021-01-01 RX ADMIN — DOXYCYCLINE 100 MG: 100 CAPSULE ORAL at 08:13

## 2021-01-01 NOTE — ASSESSMENT & PLAN NOTE
· Platelets stable at 94 on admission   · Currently 96  · No s/s of bleeding  · Likely 2/2 alcoholic liver disease vs viral infection as above  · CBC in am

## 2021-01-01 NOTE — ASSESSMENT & PLAN NOTE
Lab Results   Component Value Date    HGBA1C 9 6 (H) 12/26/2020       Recent Labs     12/31/20  1530 12/31/20 2019 01/01/21  0553 01/01/21  1116   POCGLU 209* 202* 175* 268*       Blood Sugar Average: Last 72 hrs:  (P) 148 625   · Hold home dose Amaryl and Glyxembi while inpt  · Will start on lispro 3 units t i d   With meals  · FSBS AC & HS w SSI  · Diabetic diet

## 2021-01-01 NOTE — ASSESSMENT & PLAN NOTE
· Was recently admitted from 12/25-12/27 for COVID PNA  States since getting home has had increased coughing and SOB  ALso noted he began having chest pressure on day of admission  (+) diarrhea x 1 day  · Was discharged home on Duracef  Procalcitonin prior to discharge was 1 24  Will recheck this am   · CXR reveals worsening infiltrate  · Procalcitonin noted elevated  Patient reports improvement in his symptoms  · Will continue on Rocephin and Doxycycline  · Continue remdesivir treatment as well  · Continue mild COVID-19 treatment protocol    · Incentive spirometry  · Respiratory protocol  · Hycodan PRN  · Maintain precautions

## 2021-01-01 NOTE — ASSESSMENT & PLAN NOTE
· BP adequately controlled on current regimen  · Continue home dose Cardizem, Losartan, Maxzide  · Monitor BP per unit protocol

## 2021-01-01 NOTE — PROGRESS NOTES
Progress Note Sheeba Carmen 1955, 77 y o  male MRN: [id number]    Unit/Bed#: -01 Encounter: [id number]    Primary Care Provider: Deja Sher MD   Date and time admitted to hospital: 12/29/2020  1:38 AM        * Pneumonia due to COVID-19 virus  Assessment & Plan  · Was recently admitted from 12/25-12/27 for COVID PNA  States since getting home has had increased coughing and SOB  ALso noted he began having chest pressure on day of admission  (+) diarrhea x 1 day  · Was discharged home on Duracef  Procalcitonin prior to discharge was 1 24  Will recheck this am   · CXR reveals worsening infiltrate  · Procalcitonin noted elevated  Patient reports improvement in his symptoms  · Will continue on Rocephin and Doxycycline  · Continue remdesivir treatment as well  · Continue mild COVID-19 treatment protocol  · Incentive spirometry  · Respiratory protocol  · Hycodan PRN  · Maintain precautions    Chest pressure  Assessment & Plan  · Report chest pressure beginning on day of admission  States pain does worsen with coughing  (+) COVID as above  · Initial troponin 0 05 x2 subsequently downtrended 0 04 x 2  · Currently asymptomatic  Elevated troponin  Assessment & Plan  · Initial troponin slightly elevated at 0 05 as above  · Downtrended 0 04 x 2 now  · May be troponin leak 2/2 viral infection  · Currently asymptomatic  Insomnia  Assessment & Plan  · Improved, continue Hycodan p r n       Thrombocytopenia ([facility] [address] )  Assessment & Plan  · Platelets stable at 94 on admission   · Currently 96  · No s/s of bleeding  · Likely 2/2 alcoholic liver disease vs viral infection as above  · CBC in am    Anemia  Assessment & Plan  · Hemoglobin stable around 8-9 range  · No signs of active bleeding noted  · Hemodynamically stable  Alcoholic cirrhosis ([facility] [address] )  Assessment & Plan  Present admission with history of alcoholic cirrhosis  MELD score 11  AFP within normal limits  Hepatitis panel negative    Not encephalopathic  Recommended close follow-up with Gastroenterology outpatient  Patient is agreeable plan  Benign essential HTN  Assessment & Plan  · BP adequately controlled on current regimen  · Continue home dose Cardizem, Losartan, Maxzide  · Monitor BP per unit protocol    Type 2 diabetes mellitus [facility])  Assessment & Plan  Lab Results   Component Value Date    HGBA1C 9 6 (H) 2020       Recent Labs     20  1530 20  0553 21  1116   POCGLU 209* 202* 175* 268*       Blood Sugar Average: Last 72 hrs:  (P) 148 625   · Hold home dose Amaryl and Glyxembi while inpt  · Will start on lispro 3 units t i d  With meals  · FSBS AC & HS w SSI  · Diabetic diet    Alcohol abuse  Assessment & Plan  · States has not had any alcohol since   · Denies any withdrawal symptoms  · CIWA 0  · Continue folic acid and thiamine      VTE Pharmacologic Prophylaxis:   Pharmacologic: Enoxaparin (Lovenox)    Patient Centered Rounds: I have performed bedside rounds with nursing staff today  Time Spent for Care: 30 minutes  More than 50% of total time spent on counseling and coordination of care as described above  Current Length of Stay: 2 day(s)    Current Patient Status: Inpatient   Certification Statement: The patient will continue to require additional inpatient hospital stay due to Above    Discharge Plan:  Expected 48 hours    Code Status: Level 1 - Full Code      Subjective:   Noted with intermittent fever  Had fever of 102 2F tjhis am   Currently afebrile, hemodynamically stable  O2 saturation 98% on room air  Patient is in good spirit  Reports overall improvement in his symptoms  Denies any new complaints  No other events reported      Objective:     Vitals:   Temp (24hrs), Av 6 °F (38 1 °C), Min:99 2 °F (37 3 °C), Max:102 2 °F (39 °C)    Temp:  [99 2 °F (37 3 °C)-102 2 °F (39 °C)] 99 2 °F (37 3 °C)  HR:  [78-89] 78  Resp:  [18-19] 18  BP: (110-117)/(56-58) 110/56  SpO2: [94 %-98 %] 98 %  Body mass index is 34 1 kg/m²  Input and Output Summary (last 24 hours): Intake/Output Summary (Last 24 hours) at 1/1/2021 1526  Last data filed at 12/31/2020 1900  Gross per 24 hour   Intake 300 ml   Output 800 ml   Net -500 ml       Physical Exam:     Physical Exam  Constitutional:       General: He is not in acute distress  Appearance: Normal appearance  He is not ill-appearing  Comments: Examined patient via video/visual observation and with Nurse's assistance and remoting in due to COVID 19 Precaution  Obese male patient not in distress  Eyes:      Extraocular Movements: Extraocular movements intact  Neck:      Musculoskeletal: Normal range of motion and neck supple  Cardiovascular:      Rate and Rhythm: Normal rate and regular rhythm  Pulses: Normal pulses  Heart sounds: Normal heart sounds  Pulmonary:      Effort: Pulmonary effort is normal  No respiratory distress  Breath sounds: Normal breath sounds  No wheezing  Comments: Decreased breath sounds, not in distress  Abdominal:      General: There is no distension  Tenderness: There is no abdominal tenderness  Musculoskeletal: Normal range of motion  Neurological:      General: No focal deficit present  Mental Status: He is alert and oriented to person, place, and time  Mental status is at baseline     Psychiatric:         Mood and Affect: Mood normal          Behavior: Behavior normal            Additional Data:     Labs:    Results from last 7 days   Lab Units 01/01/21  0555 12/30/20  0554   WBC Thousand/uL 5 12 3 45*   HEMOGLOBIN g/dL 9 0* 9 0*   HEMATOCRIT % 34 6* 32 5*   PLATELETS Thousands/uL 96* 69*   BANDS PCT % 1  --    NEUTROS PCT %  --  60   LYMPHS PCT %  --  27   LYMPHO PCT % 22  --    MONOS PCT %  --  10   MONO PCT % 8  --    EOS PCT % 0 1     Results from last 7 days   Lab Units 01/01/21  0555   SODIUM mmol/L 133*   POTASSIUM mmol/L 4 3   CHLORIDE mmol/L 100   CO2 mmol/L 22   BUN mg/dL 16   CREATININE mg/dL 1 08   ANION GAP mmol/L 11   CALCIUM mg/dL 8 1*   ALBUMIN g/dL 2 2*   TOTAL BILIRUBIN mg/dL 1 50*   ALK PHOS U/L 202*   ALT U/L 50   AST U/L 82*   GLUCOSE RANDOM mg/dL 149*     Results from last 7 days   Lab Units 01/01/21  0555   INR  1 29*     Results from last 7 days   Lab Units 01/01/21  1116 01/01/21  0553 12/31/20  2019 12/31/20  1530 12/31/20  1108 12/31/20  0536 12/30/20  2138 12/30/20  1601 12/30/20  1117 12/30/20  1030 12/30/20  0642 12/29/20  2127   POC GLUCOSE mg/dl 268* 175* 202* 209* 221* 102 169* 180* 168* 181* 77 102     Results from last 7 days   Lab Units 12/26/20  0503   HEMOGLOBIN A1C % 9 6*     Results from last 7 days   Lab Units 12/30/20  0601 12/29/20  0716 12/27/20  0532 12/26/20  0504   PROCALCITONIN ng/ml 1 63* 1 97* 1 24* 1 14*           * I Have Reviewed All Lab Data Listed Above  * Additional Pertinent Lab Tests Reviewed:  All Labs Within Last 24 Hours Reviewed      Recent Cultures (last 7 days):           Last 24 Hours Medication List:   Current Facility-Administered Medications   Medication Dose Route Frequency Provider Last Rate    acetaminophen  975 mg Oral Q6H PRN Emerson Hernández PA-C      albuterol  2 puff Inhalation Q4H PRN Emerson Hernández PA-C      ascorbic acid  1,000 mg Oral Daily Emerson Hernández PA-C      calcium carbonate  1,000 mg Oral Daily PRN Emerson Hernández PA-C      cefTRIAXone  1,000 mg Intravenous Q24H Emerson Hernández PA-C 1,000 mg (01/01/21 0603)    cholecalciferol  2,000 Units Oral Daily Emerson Hernández PA-C      diltiazem  180 mg Oral Daily Bryan Nicole      doxycycline hyclate  100 mg Oral Q12H [address] Emerson Hernández PA-C      enoxaparin  30 mg Subcutaneous Q12H [address] Marquis Loyd MD      ferrous sulfate  325 mg Oral Daily With Breakfast Emerson Hernández PA-C      fluticasone-vilanterol  1 puff Inhalation Daily Bryan Nicole      folic acid  1 mg Oral Daily Emerson Hernández PA-C      HYDROcodone-homatropine  5 mL Oral Q4H PRN Vinicio [place], ABELARDO      insulin lispro  1-6 Units Subcutaneous TID AC Silvana Roe PA-C      insulin lispro  3 Units Subcutaneous TID With Meals Ernie Tidwell MD      loratadine  10 mg Oral Daily Vinicio AnaABELARDO      losartan  50 mg Oral Daily Vinicio [place]      melatonin  6 mg Oral HS PRN Vinicio NewarkABELARDO      ondansetron  4 mg Intravenous Q6H PRN Vinicio Ana, ABELARDO      pantoprazole  40 mg Oral Daily Vinicio [place]      remdesivir  100 mg Intravenous Q24H Ernie Tidwell  mg (01/01/21 1129)    saccharomyces boulardii  250 mg Oral BID Vinicio NewarkABELARDO horn      thiamine  100 mg Oral Daily Vinicio AnaABELARDO horn      triamterene-hydrochlorothiazide  1 tablet Oral Daily Ernie Tidwell MD      zinc sulfate  220 mg Oral Daily Viniciomary Perez PA-C          Today, Patient Was Seen By: Skip Davison MD    ** Please Note: Dictation voice to text software may have been used in the creation of this document   **

## 2021-01-02 LAB
ALBUMIN SERPL BCP-MCNC: 1.9 G/DL (ref 3.5–5)
ALP SERPL-CCNC: 192 U/L (ref 46–116)
ALT SERPL W P-5'-P-CCNC: 42 U/L (ref 12–78)
ANION GAP SERPL CALCULATED.3IONS-SCNC: 10 MMOL/L (ref 4–13)
AST SERPL W P-5'-P-CCNC: 53 U/L (ref 5–45)
BILIRUB SERPL-MCNC: 1.3 MG/DL (ref 0.2–1)
BUN SERPL-MCNC: 16 MG/DL (ref 5–25)
CALCIUM ALBUM COR SERPL-MCNC: 9.5 MG/DL (ref 8.3–10.1)
CALCIUM SERPL-MCNC: 7.8 MG/DL (ref 8.3–10.1)
CHLORIDE SERPL-SCNC: 101 MMOL/L (ref 100–108)
CO2 SERPL-SCNC: 22 MMOL/L (ref 21–32)
CREAT SERPL-MCNC: 1.03 MG/DL (ref 0.6–1.3)
ERYTHROCYTE [DISTWIDTH] IN BLOOD BY AUTOMATED COUNT: 23.9 % (ref 11.6–15.1)
GFR SERPL CREATININE-BSD FRML MDRD: 75 ML/MIN/1.73SQ M
GLUCOSE SERPL-MCNC: 200 MG/DL (ref 65–140)
GLUCOSE SERPL-MCNC: 217 MG/DL (ref 65–140)
GLUCOSE SERPL-MCNC: 284 MG/DL (ref 65–140)
GLUCOSE SERPL-MCNC: 300 MG/DL (ref 65–140)
GLUCOSE SERPL-MCNC: 313 MG/DL (ref 65–140)
HCT VFR BLD AUTO: 29.1 % (ref 36.5–49.3)
HGB BLD-MCNC: 8.4 G/DL (ref 12–17)
INR PPP: 1.4 (ref 0.84–1.19)
MCH RBC QN AUTO: 20.5 PG (ref 26.8–34.3)
MCHC RBC AUTO-ENTMCNC: 28.9 G/DL (ref 31.4–37.4)
MCV RBC AUTO: 71 FL (ref 82–98)
NRBC BLD AUTO-RTO: 0 /100 WBCS
PLATELET # BLD AUTO: 76 THOUSANDS/UL (ref 149–390)
POTASSIUM SERPL-SCNC: 3.7 MMOL/L (ref 3.5–5.3)
PROCALCITONIN SERPL-MCNC: 1.06 NG/ML
PROT SERPL-MCNC: 5.8 G/DL (ref 6.4–8.2)
PROTHROMBIN TIME: 16.5 SECONDS (ref 11.6–14.5)
RBC # BLD AUTO: 4.09 MILLION/UL (ref 3.88–5.62)
SODIUM SERPL-SCNC: 133 MMOL/L (ref 136–145)
WBC # BLD AUTO: 3.67 THOUSAND/UL (ref 4.31–10.16)

## 2021-01-02 PROCEDURE — 84145 PROCALCITONIN (PCT): CPT | Performed by: STUDENT IN AN ORGANIZED HEALTH CARE EDUCATION/TRAINING PROGRAM

## 2021-01-02 PROCEDURE — 99232 SBSQ HOSP IP/OBS MODERATE 35: CPT | Performed by: STUDENT IN AN ORGANIZED HEALTH CARE EDUCATION/TRAINING PROGRAM

## 2021-01-02 PROCEDURE — 85610 PROTHROMBIN TIME: CPT | Performed by: STUDENT IN AN ORGANIZED HEALTH CARE EDUCATION/TRAINING PROGRAM

## 2021-01-02 PROCEDURE — 80053 COMPREHEN METABOLIC PANEL: CPT | Performed by: STUDENT IN AN ORGANIZED HEALTH CARE EDUCATION/TRAINING PROGRAM

## 2021-01-02 PROCEDURE — 82948 REAGENT STRIP/BLOOD GLUCOSE: CPT

## 2021-01-02 PROCEDURE — 85027 COMPLETE CBC AUTOMATED: CPT | Performed by: STUDENT IN AN ORGANIZED HEALTH CARE EDUCATION/TRAINING PROGRAM

## 2021-01-02 RX ADMIN — REMDESIVIR 100 MG: 100 INJECTION, POWDER, LYOPHILIZED, FOR SOLUTION INTRAVENOUS at 11:22

## 2021-01-02 RX ADMIN — OXYCODONE HYDROCHLORIDE AND ACETAMINOPHEN 1000 MG: 500 TABLET ORAL at 08:35

## 2021-01-02 RX ADMIN — DILTIAZEM HYDROCHLORIDE 180 MG: 180 CAPSULE, COATED, EXTENDED RELEASE ORAL at 08:36

## 2021-01-02 RX ADMIN — PANTOPRAZOLE SODIUM 40 MG: 40 TABLET, DELAYED RELEASE ORAL at 08:36

## 2021-01-02 RX ADMIN — HYDROCODONE BITARTRATE AND HOMATROPINE METHYLBROMIDE 5 ML: 5; 1.5 SYRUP ORAL at 23:01

## 2021-01-02 RX ADMIN — FERROUS SULFATE TAB 325 MG (65 MG ELEMENTAL FE) 325 MG: 325 (65 FE) TAB at 08:34

## 2021-01-02 RX ADMIN — DOXYCYCLINE 100 MG: 100 CAPSULE ORAL at 08:35

## 2021-01-02 RX ADMIN — FLUTICASONE FUROATE AND VILANTEROL TRIFENATATE 1 PUFF: 100; 25 POWDER RESPIRATORY (INHALATION) at 08:37

## 2021-01-02 RX ADMIN — TRIAMTERENE AND HYDROCHLOROTHIAZIDE 1 TABLET: 37.5; 25 TABLET ORAL at 08:35

## 2021-01-02 RX ADMIN — CEFTRIAXONE SODIUM 1000 MG: 10 INJECTION, POWDER, FOR SOLUTION INTRAVENOUS at 05:37

## 2021-01-02 RX ADMIN — LORATADINE 10 MG: 10 TABLET ORAL at 08:35

## 2021-01-02 RX ADMIN — THIAMINE HCL TAB 100 MG 100 MG: 100 TAB at 08:54

## 2021-01-02 RX ADMIN — INSULIN LISPRO 4 UNITS: 100 INJECTION, SOLUTION INTRAVENOUS; SUBCUTANEOUS at 16:06

## 2021-01-02 RX ADMIN — Medication 250 MG: at 16:06

## 2021-01-02 RX ADMIN — ZINC SULFATE 220 MG (50 MG) CAPSULE 220 MG: CAPSULE at 08:36

## 2021-01-02 RX ADMIN — INSULIN LISPRO 2 UNITS: 100 INJECTION, SOLUTION INTRAVENOUS; SUBCUTANEOUS at 08:45

## 2021-01-02 RX ADMIN — Medication 2000 UNITS: at 08:36

## 2021-01-02 RX ADMIN — LOSARTAN POTASSIUM 50 MG: 50 TABLET, FILM COATED ORAL at 08:36

## 2021-01-02 RX ADMIN — FOLIC ACID 1 MG: 1 TABLET ORAL at 08:36

## 2021-01-02 RX ADMIN — ENOXAPARIN SODIUM 30 MG: 30 INJECTION SUBCUTANEOUS at 08:35

## 2021-01-02 RX ADMIN — Medication 250 MG: at 08:36

## 2021-01-02 RX ADMIN — DOXYCYCLINE 100 MG: 100 CAPSULE ORAL at 20:23

## 2021-01-02 RX ADMIN — INSULIN LISPRO 5 UNITS: 100 INJECTION, SOLUTION INTRAVENOUS; SUBCUTANEOUS at 11:54

## 2021-01-02 RX ADMIN — ENOXAPARIN SODIUM 30 MG: 30 INJECTION SUBCUTANEOUS at 20:24

## 2021-01-02 NOTE — ASSESSMENT & PLAN NOTE
Problem: Potential for Falls  Goal: Patient will remain free of falls  Description  INTERVENTIONS:  - Assess patient frequently for physical needs  -  Identify cognitive and physical deficits and behaviors that affect risk of falls    -  [place] fall precautions as indicated by assessment   - Educate patient/family on patient safety including physical limitations  - Instruct patient to call for assistance with activity based on assessment  - Modify environment to reduce risk of injury  - Consider OT/PT consult to assist with strengthening/mobility  Outcome: Progressing · Report chest pressure beginning on day of admission  States pain does worsen with coughing  (+) COVID as above  · Initial troponin 0 05 x2 subsequently downtrended 0 04 x 2  · Currently asymptomatic

## 2021-01-02 NOTE — PROGRESS NOTES
Progress Note Mignon Gu 1955, 77 y o  male MRN: [id number]    Unit/Bed#: -Mary Encounter: [id number]    Primary Care Provider: Sena Bynum MD   Date and time admitted to hospital: 12/29/2020  1:38 AM        * Pneumonia due to COVID-19 virus  Assessment & Plan  · Was recently admitted from 12/25-12/27 for COVID PNA  States since getting home has had increased coughing and SOB  ALso noted he began having chest pressure on day of admission  (+) diarrhea x 1 day  · Was discharged home on Duracef  Procalcitonin prior to discharge was 1 24  Will recheck this am   · CXR reveals worsening infiltrate  · Procalcitonin noted elevated  Patient reports improvement in his symptoms  · Suspected superimposed bacterial infarction as well therefore Will continue on Rocephin and Doxycycline to complete total 5 day course today  · Continue remdesivir treatment as well  · Continue mild COVID-19 treatment protocol  · Incentive spirometry  · Respiratory protocol  · Hycodan PRN  · Maintain precautions    Chest pressure  Assessment & Plan  · Report chest pressure beginning on day of admission  States pain does worsen with coughing  (+) COVID as above  · Initial troponin 0 05 x2 subsequently downtrended 0 04 x 2  · Currently asymptomatic  Elevated troponin  Assessment & Plan  · Initial troponin slightly elevated at 0 05 as above  · Downtrended 0 04 x 2 now  · May be troponin leak 2/2 viral infection  · Currently asymptomatic  Insomnia  Assessment & Plan  · Improved, continue Hycodan p r n       Thrombocytopenia ([facility] [address] )  Assessment & Plan  · Platelets stable at 94 on admission   · Currently 76   · No s/s of bleeding  · Likely 2/2 alcoholic liver disease vs viral infection as above  · CBC in am    Anemia  Assessment & Plan  · Hemoglobin stable around 8-9 range  · No signs of active bleeding noted  · Hemodynamically stable      Alcoholic cirrhosis ([facility] [address] )  Assessment & Plan  Present admission with history of alcoholic cirrhosis  MELD score 11  AFP within normal limits  Hepatitis panel negative  Not encephalopathic  Recommended close follow-up with Gastroenterology outpatient  Patient is agreeable plan  Benign essential HTN  Assessment & Plan  · BP adequately controlled on current regimen  · Continue home dose Cardizem, Losartan, Maxzide  · Monitor BP per unit protocol    Type 2 diabetes mellitus [facility])  Assessment & Plan  Lab Results   Component Value Date    HGBA1C 9 6 (H) 2020       Recent Labs     21  1608 21  2100 21  0606 21  1136   POCGLU 285* 268* 217* 313*       Blood Sugar Average: Last 72 hrs:  (P) 836 [id number]   · Hold home dose Amaryl and Glyxembi while inpt  · Increase lispro 6  units t i d  With meals  · FSBS AC & HS w SSI  · Diabetic diet    Alcohol abuse  Assessment & Plan  · States has not had any alcohol since   · Denies any withdrawal symptoms  · CIWA 0  · Continue folic acid and thiamine        VTE Pharmacologic Prophylaxis:   Pharmacologic: Enoxaparin (Lovenox)    Patient Centered Rounds: I have performed bedside rounds with nursing staff today  Time Spent for Care: 30 minutes  More than 50% of total time spent on counseling and coordination of care as described above  Current Length of Stay: 3 day(s)    Current Patient Status: Inpatient   Certification Statement: The patient will continue to require additional inpatient hospital stay due to Above    Discharge Plan:  Expected in 24-48 hours    Code Status: Level 1 - Full Code      Subjective:   Afebrile, hemodynamically stable  O2 saturation 97% on room air  Currently noted resting comfortably wakes up with verbal stimuli  Reports overall feeling better  Will be completing 5/5 days of antibiotics and remdesivir treatment today  No other events reported       Objective:     Vitals:   Temp (24hrs), Av 9 °F (37 2 °C), Min:98 1 °F (36 7 °C), Max:99 4 °F (37 4 °C)    Temp:  [98 1 °F (36 7 °C)-99 4 °F (37 4 °C)] 98 1 °F (36 7 °C)  HR:  [71-77] 71  Resp:  [18-19] 18  BP: (114-126)/(59-61) 114/59  SpO2:  [96 %-97 %] 97 %  Body mass index is 34 1 kg/m²  Input and Output Summary (last 24 hours): Intake/Output Summary (Last 24 hours) at 1/2/2021 1545  Last data filed at 1/2/2021 0844  Gross per 24 hour   Intake 620 ml   Output 1500 ml   Net -880 ml       Physical Exam:     Physical Exam  Constitutional:       General: He is not in acute distress  Appearance: Normal appearance  He is not ill-appearing  Comments: Examined patient via video/visual observation and with Nurse's assistance and remoting in due to COVID 19 Precaution  Obese male patient not in distress  Eyes:      Extraocular Movements: Extraocular movements intact  Neck:      Musculoskeletal: Normal range of motion and neck supple  Cardiovascular:      Rate and Rhythm: Normal rate and regular rhythm  Pulses: Normal pulses  Heart sounds: Normal heart sounds  Pulmonary:      Effort: Pulmonary effort is normal  No respiratory distress  Breath sounds: Normal breath sounds  No wheezing  Comments: Decreased breath sounds, not in distress  Abdominal:      General: There is no distension  Tenderness: There is no abdominal tenderness  Musculoskeletal: Normal range of motion  Neurological:      General: No focal deficit present  Mental Status: He is alert and oriented to person, place, and time  Mental status is at baseline     Psychiatric:         Mood and Affect: Mood normal          Behavior: Behavior normal          Additional Data:     Labs:    Results from last 7 days   Lab Units 01/02/21  0510 01/01/21  0555 12/30/20  0554   WBC Thousand/uL 3 67* 5 12 3 45*   HEMOGLOBIN g/dL 8 4* 9 0* 9 0*   HEMATOCRIT % 29 1* 34 6* 32 5*   PLATELETS Thousands/uL 76* 96* 69*   BANDS PCT %  --  1  --    NEUTROS PCT %  --   --  60   LYMPHS PCT %  --   --  27   LYMPHO PCT %  --  22  --    MONOS PCT %  --   --  10   MONO PCT %  --  8  --    EOS PCT %  --  0 1     Results from last 7 days   Lab Units 01/02/21  0510   SODIUM mmol/L 133*   POTASSIUM mmol/L 3 7   CHLORIDE mmol/L 101   CO2 mmol/L 22   BUN mg/dL 16   CREATININE mg/dL 1 03   ANION GAP mmol/L 10   CALCIUM mg/dL 7 8*   ALBUMIN g/dL 1 9*   TOTAL BILIRUBIN mg/dL 1 30*   ALK PHOS U/L 192*   ALT U/L 42   AST U/L 53*   GLUCOSE RANDOM mg/dL 200*     Results from last 7 days   Lab Units 01/02/21  0510   INR  1 40*     Results from last 7 days   Lab Units 01/02/21  1136 01/02/21  0606 01/01/21  2100 01/01/21  1608 01/01/21  1116 01/01/21  0553 12/31/20  2019 12/31/20  1530 12/31/20  1108 12/31/20  0536 12/30/20  2138 12/30/20  1601   POC GLUCOSE mg/dl 313* 217* 268* 285* 268* 175* 202* 209* 221* 102 169* 180*         Results from last 7 days   Lab Units 12/30/20  0601 12/29/20  0716 12/27/20  0532   PROCALCITONIN ng/ml 1 63* 1 97* 1 24*           * I Have Reviewed All Lab Data Listed Above  * Additional Pertinent Lab Tests Reviewed:  All Labs Within Last 24 Hours Reviewed        Recent Cultures (last 7 days):           Last 24 Hours Medication List:   Current Facility-Administered Medications   Medication Dose Route Frequency Provider Last Rate    acetaminophen  975 mg Oral Q6H PRN Lucyann Flavin, PA-C      albuterol  2 puff Inhalation Q4H PRN Lucyann Flavin, PA-C      ascorbic acid  1,000 mg Oral Daily Lucyann Flavin, PA-C      calcium carbonate  1,000 mg Oral Daily PRN Lucyann Flavin, PA-C      cefTRIAXone  1,000 mg Intravenous Q24H Lucyann Flavin, PA-C 1,000 mg (01/02/21 0537)    cholecalciferol  2,000 Units Oral Daily Lucyann Flavin, PA-C      diltiazem  180 mg Oral Daily Lucyann Flavin, [place]      doxycycline hyclate  100 mg Oral Q12H [address] Lucyann Flavin, PA-C      enoxaparin  30 mg Subcutaneous Q12H [address] Belkys Baxter MD      ferrous sulfate  325 mg Oral Daily With Breakfast Rosa Aguilar PA-C      fluticasone-vilanterol  1 puff Inhalation Daily [place], [place]      folic acid  1 mg Oral Daily [place], [place]      HYDROcodone-homatropine  5 mL Oral Q4H PRN Tom Álvarez PA-C      insulin lispro  1-6 Units Subcutaneous TID AC Silvana Roe PA-C      insulin lispro  6 Units Subcutaneous TID With Meals Shun Quinn MD      loratadine  10 mg [facility] Daily [facility]      losartan  50 mg Oral Daily [place], [place]      melatonin  6 mg Oral HS PRN Tom Álvarez PA-C      ondansetron  4 mg Intravenous Q6H PRN Tom Álvarez PA-C      pantoprazole  40 mg Oral Daily [place], [place]      saccharomyces boulardii  250 mg Oral BID [place], [place]      thiamine  100 mg Oral Daily [place], [place]      triamterene-hydrochlorothiazide  1 tablet Oral Daily Shun Quinn MD      zinc sulfate  220 mg Oral Daily Tom Álvarez PA-C          Today, Patient Was Seen By: Louie Perez MD    ** Please Note: Dictation voice to text software may have been used in the creation of this document   **

## 2021-01-02 NOTE — RESPIRATORY THERAPY NOTE
Visited patient to place him on CPAP  Pt refers to go on at 23:30  Pt was already asleep on his home unit

## 2021-01-02 NOTE — ASSESSMENT & PLAN NOTE
Lab Results   Component Value Date    HGBA1C 9 6 (H) 12/26/2020       Recent Labs     01/01/21  1608 01/01/21  2100 01/02/21  0606 01/02/21  1136   POCGLU 285* 268* 217* 313*       Blood Sugar Average: Last 72 hrs:  (P) 116 [id number]   · Hold home dose Amaryl and Glyxembi while inpt  · Increase lispro 6  units t i d   With meals  · FSBS AC & HS w SSI  · Diabetic diet

## 2021-01-02 NOTE — ASSESSMENT & PLAN NOTE
· Was recently admitted from 12/25-12/27 for COVID PNA  States since getting home has had increased coughing and SOB  ALso noted he began having chest pressure on day of admission  (+) diarrhea x 1 day  · Was discharged home on Duracef  Procalcitonin prior to discharge was 1 24  Will recheck this am   · CXR reveals worsening infiltrate  · Procalcitonin noted elevated  Patient reports improvement in his symptoms  · Suspected superimposed bacterial infarction as well therefore Will continue on Rocephin and Doxycycline to complete total 5 day course today  · Continue remdesivir treatment as well  · Continue mild COVID-19 treatment protocol    · Incentive spirometry  · Respiratory protocol  · Hycodan PRN  · Maintain precautions

## 2021-01-03 VITALS
WEIGHT: 243.17 LBS | HEART RATE: 73 BPM | RESPIRATION RATE: 18 BRPM | HEIGHT: 71 IN | TEMPERATURE: 98.4 F | OXYGEN SATURATION: 97 % | BODY MASS INDEX: 34.04 KG/M2 | SYSTOLIC BLOOD PRESSURE: 104 MMHG | DIASTOLIC BLOOD PRESSURE: 59 MMHG

## 2021-01-03 LAB
ALBUMIN SERPL BCP-MCNC: 1.9 G/DL (ref 3.5–5)
ALP SERPL-CCNC: 205 U/L (ref 46–116)
ALT SERPL W P-5'-P-CCNC: 36 U/L (ref 12–78)
ANION GAP SERPL CALCULATED.3IONS-SCNC: 12 MMOL/L (ref 4–13)
AST SERPL W P-5'-P-CCNC: 56 U/L (ref 5–45)
BASOPHILS # BLD AUTO: 0.02 THOUSANDS/ΜL (ref 0–0.1)
BASOPHILS NFR BLD AUTO: 1 % (ref 0–1)
BILIRUB SERPL-MCNC: 1.2 MG/DL (ref 0.2–1)
BUN SERPL-MCNC: 17 MG/DL (ref 5–25)
CALCIUM ALBUM COR SERPL-MCNC: 10.1 MG/DL (ref 8.3–10.1)
CALCIUM SERPL-MCNC: 8.4 MG/DL (ref 8.3–10.1)
CHLORIDE SERPL-SCNC: 102 MMOL/L (ref 100–108)
CO2 SERPL-SCNC: 18 MMOL/L (ref 21–32)
CREAT SERPL-MCNC: 1.02 MG/DL (ref 0.6–1.3)
CRP SERPL QL: 86.3 MG/L
D DIMER PPP FEU-MCNC: 0.75 UG/ML FEU
EOSINOPHIL # BLD AUTO: 0.11 THOUSAND/ΜL (ref 0–0.61)
EOSINOPHIL NFR BLD AUTO: 3 % (ref 0–6)
ERYTHROCYTE [DISTWIDTH] IN BLOOD BY AUTOMATED COUNT: 25.2 % (ref 11.6–15.1)
GFR SERPL CREATININE-BSD FRML MDRD: 76 ML/MIN/1.73SQ M
GLUCOSE SERPL-MCNC: 216 MG/DL (ref 65–140)
GLUCOSE SERPL-MCNC: 219 MG/DL (ref 65–140)
GLUCOSE SERPL-MCNC: 284 MG/DL (ref 65–140)
HCT VFR BLD AUTO: 30.7 % (ref 36.5–49.3)
HGB BLD-MCNC: 8.6 G/DL (ref 12–17)
IMM GRANULOCYTES # BLD AUTO: 0.02 THOUSAND/UL (ref 0–0.2)
IMM GRANULOCYTES NFR BLD AUTO: 1 % (ref 0–2)
INR PPP: 1.25 (ref 0.84–1.19)
LYMPHOCYTES # BLD AUTO: 1.16 THOUSANDS/ΜL (ref 0.6–4.47)
LYMPHOCYTES NFR BLD AUTO: 28 % (ref 14–44)
MCH RBC QN AUTO: 20.2 PG (ref 26.8–34.3)
MCHC RBC AUTO-ENTMCNC: 28 G/DL (ref 31.4–37.4)
MCV RBC AUTO: 72 FL (ref 82–98)
MONOCYTES # BLD AUTO: 0.4 THOUSAND/ΜL (ref 0.17–1.22)
MONOCYTES NFR BLD AUTO: 10 % (ref 4–12)
NEUTROPHILS # BLD AUTO: 2.44 THOUSANDS/ΜL (ref 1.85–7.62)
NEUTS SEG NFR BLD AUTO: 57 % (ref 43–75)
NRBC BLD AUTO-RTO: 0 /100 WBCS
PLATELET # BLD AUTO: 85 THOUSANDS/UL (ref 149–390)
POTASSIUM SERPL-SCNC: 4.2 MMOL/L (ref 3.5–5.3)
PROT SERPL-MCNC: 6.1 G/DL (ref 6.4–8.2)
PROTHROMBIN TIME: 15.1 SECONDS (ref 11.6–14.5)
RBC # BLD AUTO: 4.25 MILLION/UL (ref 3.88–5.62)
SODIUM SERPL-SCNC: 132 MMOL/L (ref 136–145)
WBC # BLD AUTO: 4.15 THOUSAND/UL (ref 4.31–10.16)

## 2021-01-03 PROCEDURE — 86140 C-REACTIVE PROTEIN: CPT | Performed by: STUDENT IN AN ORGANIZED HEALTH CARE EDUCATION/TRAINING PROGRAM

## 2021-01-03 PROCEDURE — 85610 PROTHROMBIN TIME: CPT | Performed by: STUDENT IN AN ORGANIZED HEALTH CARE EDUCATION/TRAINING PROGRAM

## 2021-01-03 PROCEDURE — 85379 FIBRIN DEGRADATION QUANT: CPT | Performed by: STUDENT IN AN ORGANIZED HEALTH CARE EDUCATION/TRAINING PROGRAM

## 2021-01-03 PROCEDURE — 80053 COMPREHEN METABOLIC PANEL: CPT | Performed by: STUDENT IN AN ORGANIZED HEALTH CARE EDUCATION/TRAINING PROGRAM

## 2021-01-03 PROCEDURE — 99239 HOSP IP/OBS DSCHRG MGMT >30: CPT | Performed by: STUDENT IN AN ORGANIZED HEALTH CARE EDUCATION/TRAINING PROGRAM

## 2021-01-03 PROCEDURE — 85025 COMPLETE CBC W/AUTO DIFF WBC: CPT | Performed by: STUDENT IN AN ORGANIZED HEALTH CARE EDUCATION/TRAINING PROGRAM

## 2021-01-03 PROCEDURE — 82948 REAGENT STRIP/BLOOD GLUCOSE: CPT

## 2021-01-03 RX ORDER — MULTIVITAMIN
1 CAPSULE ORAL DAILY
Qty: 21 CAPSULE | Refills: 0 | Status: SHIPPED | OUTPATIENT
Start: 2021-01-03

## 2021-01-03 RX ADMIN — Medication 250 MG: at 08:34

## 2021-01-03 RX ADMIN — PANTOPRAZOLE SODIUM 40 MG: 40 TABLET, DELAYED RELEASE ORAL at 08:35

## 2021-01-03 RX ADMIN — FOLIC ACID 1 MG: 1 TABLET ORAL at 08:35

## 2021-01-03 RX ADMIN — LORATADINE 10 MG: 10 TABLET ORAL at 08:35

## 2021-01-03 RX ADMIN — FLUTICASONE FUROATE AND VILANTEROL TRIFENATATE 1 PUFF: 100; 25 POWDER RESPIRATORY (INHALATION) at 08:41

## 2021-01-03 RX ADMIN — THIAMINE HCL TAB 100 MG 100 MG: 100 TAB at 08:34

## 2021-01-03 RX ADMIN — LOSARTAN POTASSIUM 50 MG: 50 TABLET, FILM COATED ORAL at 08:34

## 2021-01-03 RX ADMIN — ENOXAPARIN SODIUM 30 MG: 30 INJECTION SUBCUTANEOUS at 08:40

## 2021-01-03 RX ADMIN — Medication 2000 UNITS: at 08:34

## 2021-01-03 RX ADMIN — OXYCODONE HYDROCHLORIDE AND ACETAMINOPHEN 1000 MG: 500 TABLET ORAL at 08:34

## 2021-01-03 RX ADMIN — CEFTRIAXONE SODIUM 1000 MG: 10 INJECTION, POWDER, FOR SOLUTION INTRAVENOUS at 05:14

## 2021-01-03 RX ADMIN — DILTIAZEM HYDROCHLORIDE 180 MG: 180 CAPSULE, COATED, EXTENDED RELEASE ORAL at 08:35

## 2021-01-03 RX ADMIN — INSULIN LISPRO 4 UNITS: 100 INJECTION, SOLUTION INTRAVENOUS; SUBCUTANEOUS at 12:20

## 2021-01-03 RX ADMIN — TRIAMTERENE AND HYDROCHLOROTHIAZIDE 1 TABLET: 37.5; 25 TABLET ORAL at 08:34

## 2021-01-03 RX ADMIN — DOXYCYCLINE 100 MG: 100 CAPSULE ORAL at 08:35

## 2021-01-03 RX ADMIN — INSULIN LISPRO 2 UNITS: 100 INJECTION, SOLUTION INTRAVENOUS; SUBCUTANEOUS at 08:30

## 2021-01-03 RX ADMIN — FERROUS SULFATE TAB 325 MG (65 MG ELEMENTAL FE) 325 MG: 325 (65 FE) TAB at 08:35

## 2021-01-03 RX ADMIN — ZINC SULFATE 220 MG (50 MG) CAPSULE 220 MG: CAPSULE at 08:35

## 2021-01-03 NOTE — DISCHARGE INSTR - AVS FIRST PAGE
Recommended close follow-up with primary care provider in 3-5 days of discharge  Recommended close follow-up with Gastroenterology outpatient  Recommended to have repeat chest x-ray in 6 weeks with primary care provider to monitor resolution  Also recommended to have repeat blood work for CBC and CMP within 1 week with primary care provider

## 2021-01-03 NOTE — ASSESSMENT & PLAN NOTE
Lab Results   Component Value Date    HGBA1C 9 6 (H) 12/26/2020       Recent Labs     01/02/21  1558 01/02/21  2049 01/03/21  0745 01/03/21  1105   POCGLU 300* 284* 219* 284*       Blood Sugar Average: Last 72 hrs:  (P) 239 [id number]   · home meds includes Amaryl and Glyxembi   · Resume home medication upon discharge  · Recommended close follow-up with primary care provider for tighter glycemic control  · Patient is agreeable with above plan

## 2021-01-03 NOTE — DISCHARGE SUMMARY
Discharge- Miachel Fleischer 1955, 77 y o  male MRN: [id number]    Unit/Bed#: -Mary Encounter: [id number]    Primary Care Provider: Nirmala Gregory MD   Date and time admitted to hospital: 12/29/2020  1:38 AM        * Pneumonia due to COVID-19 virus  Assessment & Plan  · Was recently admitted from 12/25-12/27 for COVID PNA  States since getting home has had increased coughing and SOB  ALso noted he began having chest pressure on day of admission  (+) diarrhea x 1 day  · Was discharged home on Duracef  Procalcitonin prior to discharge was 1 24  Will recheck this am   · CXR reveals worsening infiltrate  · Procalcitonin noted elevated  ·  superimposed bacterial pneumonia  · Completed 5 days of IV ceftriaxone and doxycycline  · Completed 5 days off remdesivir treatment  · Currently reports feeling much better  O2 saturation % on room air  · Will discharge home on multivitamins  · Recommended repeat blood work within 1 week  · Recommended repeat chest x-ray in 6 weeks with primary care provider to monitor resolution  · Patient is agreeable with above plan  Chest pressure  Assessment & Plan  · Report chest pressure beginning on day of admission  States pain does worsen with coughing  (+) COVID as above  · Initial troponin 0 05 x2 subsequently downtrended 0 04 x 2  · Currently asymptomatic  Elevated troponin  Assessment & Plan  · Initial troponin slightly elevated at 0 05 as above  · Downtrended 0 04 x 2 now  · May be troponin leak 2/2 viral infection  · Currently asymptomatic  Insomnia  Assessment & Plan  · Improved  · Continue Robitussin for cough and comfort  Thrombocytopenia ([facility])  Assessment & Plan  · Platelets stable at 94 on admission   · Currently 76   · No s/s of bleeding  · Likely 2/2 alcoholic liver disease vs viral infection as above  · Outpatient follow-up  Anemia  Assessment & Plan  · Hemoglobin stable around 8-9 range    · No signs of active bleeding noted   · Hemodynamically stable  Alcoholic cirrhosis ([facility] [address] )  Assessment & Plan  Present admission with history of alcoholic cirrhosis  MELD score 11  AFP within normal limits  Hepatitis panel negative  Not encephalopathic  Recommended close follow-up with Gastroenterology outpatient  Patient is agreeable plan  Benign essential HTN  Assessment & Plan  · BP adequately controlled on current regimen  · Continue home dose Cardizem, Losartan, Maxzide  · Outpatient follow-up with primary care provider  · Patient is agreeable with above plan  Type 2 diabetes mellitus [facility])  Assessment & Plan  Lab Results   Component Value Date    HGBA1C 9 6 (H) 12/26/2020       Recent Labs     01/02/21  1558 01/02/21  2049 01/03/21  0745 01/03/21  1105   POCGLU 300* 284* 219* 284*       Blood Sugar Average: Last 72 hrs:  (P) 239 [id number]   · home meds includes Amaryl and Glyxembi   · Resume home medication upon discharge  · Recommended close follow-up with primary care provider for tighter glycemic control  · Patient is agreeable with above plan  Alcohol abuse  Assessment & Plan  · States has not had any alcohol since 12/24  · Denies any withdrawal symptoms  · CIWA 0  · Continue folic acid and thiamine  · Counseled on cessation  Discharging Physician / Practitioner: Charlee Potter MD  PCP: Dominik Camargo MD  Admission Date:   Admission Orders (From admission, onward)     Ordered        12/30/20 1525  Inpatient Admission  Once         12/29/20 0343  Place in Observation  Once                   Discharge Date: 01/03/21    Resolved Problems  Date Reviewed: 1/3/2021    None            Reason for Admission:  Pneumonia due to COVID-19 infection as well as superimposed bacterial pneumonia      Hospital Course:     Jose Bartholomew is a 77 y o  male patient with past medical history of alcohol use, diabetes, hypertension, alcoholic cirrhosis, anemia, thrombocytopenia, was recently discharged from [facility] on 12/27/2020 after being diagnosed and treated for COVID-19 pneumonia  Patient was on mild treatment protocol and discharged home on vitamins supplement and Cefadroxil due to elevated Procal  who again presented to the hospital on 12/29/2020 complaining of increasing shortness of breath, chest pressure, diarrhea  Patient reported that since getting home he had experienced worsening of his symptoms which prompted her to come to emergency room again  Chest x-ray report reviewed and noted for worsening bilateral ground-glass opacities  Procalcitonin is also noted elevated  Patient has completed 5 days of IV ceftriaxone, IV doxycycline, completed 5 days of  IV remdesivir treatment  Also continue multivitamins while inpatient  As patient was on my protocol with D-dimer around 0 75 will not dc on Anticoagulation  Currently O2 saturation % on room air  Procalcitonin downtrended  Initial troponin 0 05 x 2 noncardiac in origin and  due to PNA and downtrended 0 04x2  Currently patient denies chest pain, dyspnea, fever, chills, nausea vomiting, any new complaints  Reports feeling much better and eager to go home  Recommended to have close follow-up with primary care provider for repeat blood work within 1 week for CBC and CMP as well as repeat chest x-ray in 6 weeks to monitor resolution  Recommended close follow-up with Gastroenterology for cirrhosis as stated above  Currently hemodynamically stable for discharge  Patient verbalized in the settings of above stated outpatient follow-up plans  No other events reported  Refer to earlier notes for further clarification  Please see above list of diagnoses and related plan for additional information  Condition at Discharge: good     Discharge Day Visit / Exam:     Subjective:  Afebrile, hemodynamically stable  O2 saturation % room air  Appears comfortable not in distress    Denies chest pain, dyspnea, fever, chills, nausea, vomiting, abdominal pain, dysuria, diarrhea, any new complaints  No other events reported  Millie Aw to go home  Vitals: Blood Pressure: 104/59 (01/03/21 1401)  Pulse: 73 (01/03/21 1401)  Temperature: 98 4 °F (36 9 °C) (01/03/21 1401)  Temp Source: Oral (01/03/21 0545)  Respirations: 18 (01/03/21 1401)  Height: 5' 11" (180 3 cm) (12/29/20 0300)  Weight - Scale: 110 kg (243 lb 2 7 oz) (01/03/21 0600)  SpO2: 97 % (01/03/21 1401)  Exam:   Physical Exam  Constitutional:       General: He is not in acute distress  Appearance: Normal appearance  He is not ill-appearing  Comments: Obese male patient not in distress  Eyes:      Extraocular Movements: Extraocular movements intact  Neck:      Musculoskeletal: Normal range of motion and neck supple  Cardiovascular:      Rate and Rhythm: Normal rate and regular rhythm  Pulses: Normal pulses  Heart sounds: Normal heart sounds  Pulmonary:      Effort: Pulmonary effort is normal  No respiratory distress  Breath sounds: Normal breath sounds  No wheezing  Comments: not in distress  Abdominal:      General: There is no distension  Tenderness: There is no abdominal tenderness  Musculoskeletal: Normal range of motion  Neurological:      General: No focal deficit present  Mental Status: He is alert and oriented to person, place, and time  Mental status is at baseline  Psychiatric:         Mood and Affect: Mood normal          Behavior: Behavior normal        Discussion with Family:  Offered to call family but patient reports that he has been contact with family  Discharge instructions/Information to patient and family:   See after visit summary for information provided to patient and family  Provisions for Follow-Up Care:  See after visit summary for information related to follow-up care and any pertinent home health orders        Disposition:     Home    For Discharges to [facility] SNF:   · Not Applicable to this Patient - Not Applicable to this Patient    Planned Readmission: none     Discharge Statement:  I spent 35 minutes discharging the patient  This time was spent on the day of discharge  I had direct contact with the patient on the day of discharge  Greater than 50% of the total time was spent examining patient, answering all patient questions, arranging and discussing plan of care with patient as well as directly providing post-discharge instructions  Additional time then spent on discharge activities  Discharge Medications:  See after visit summary for reconciled discharge medications provided to patient and family        ** Please Note: This note has been constructed using a voice recognition system **

## 2021-01-03 NOTE — ASSESSMENT & PLAN NOTE
· States has not had any alcohol since 12/24  · Denies any withdrawal symptoms  · CIWA 0  · Continue folic acid and thiamine  · Counseled on cessation

## 2021-01-03 NOTE — ASSESSMENT & PLAN NOTE
· BP adequately controlled on current regimen  · Continue home dose Cardizem, Losartan, Maxzide  · Outpatient follow-up with primary care provider  · Patient is agreeable with above plan

## 2021-01-03 NOTE — PLAN OF CARE
Problem: PAIN - ADULT  Goal: Verbalizes/displays adequate comfort level or baseline comfort level  Description: Interventions:  - Encourage patient to monitor pain and request assistance  - Assess pain using appropriate pain scale  - Administer analgesics based on type and severity of pain and evaluate response  - Implement non-pharmacological measures as appropriate and evaluate response  - Consider cultural and social influences on pain and pain management  - Notify physician/advanced practitioner if interventions unsuccessful or patient reports new pain  Outcome: Progressing     Problem: INFECTION - ADULT  Goal: Absence or prevention of progression during hospitalization  Description: INTERVENTIONS:  - Assess and monitor for signs and symptoms of infection  - Monitor lab/diagnostic results  - Monitor all insertion sites, i e  indwelling lines, tubes, and drains  - Monitor endotracheal if appropriate and nasal secretions for changes in amount and color  - [place] appropriate cooling/warming therapies per order  - Administer medications as ordered  - Instruct and encourage patient and family to use good hand hygiene technique  - Identify and instruct in appropriate isolation precautions for identified infection/condition  Outcome: Progressing  Goal: Absence of fever/infection during neutropenic period  Description: INTERVENTIONS:  - Monitor WBC    Outcome: Progressing     Problem: SAFETY ADULT  Goal: Patient will remain free of falls  Description: INTERVENTIONS:  - Assess patient frequently for physical needs  -  Identify cognitive and physical deficits and behaviors that affect risk of falls    -  [place] fall precautions as indicated by assessment   - Educate patient/family on patient safety including physical limitations  - Instruct patient to call for assistance with activity based on assessment  - Modify environment to reduce risk of injury  - Consider OT/PT consult to assist with strengthening/mobility  Outcome: Progressing  Goal: Maintain or return to baseline ADL function  Description: INTERVENTIONS:  -  Assess patient's ability to carry out ADLs; assess patient's baseline for ADL function and identify physical deficits which impact ability to perform ADLs (bathing, care of mouth/teeth, toileting, grooming, dressing, etc )  - Assess/evaluate cause of self-care deficits   - Assess range of motion  - Assess patient's mobility; develop plan if impaired  - Assess patient's need for assistive devices and provide as appropriate  - Encourage maximum independence but intervene and supervise when necessary  - Involve family in performance of ADLs  - Assess for home care needs following discharge   - Consider OT consult to assist with ADL evaluation and planning for discharge  - Provide patient education as appropriate  Outcome: Progressing  Goal: Maintain or return mobility status to optimal level  Description: INTERVENTIONS:  - Assess patient's baseline mobility status (ambulation, transfers, stairs, etc )    - Identify cognitive and physical deficits and behaviors that affect mobility  - Identify mobility aids required to assist with transfers and/or ambulation (gait belt, sit-to-stand, lift, walker, cane, etc )  - House fall precautions as indicated by assessment  - Record patient progress and toleration of activity level on Mobility SBAR; progress patient to next Phase/Stage  - Instruct patient to call for assistance with activity based on assessment  - Consider rehabilitation consult to assist with strengthening/weightbearing, etc   Outcome: Progressing     Problem: DISCHARGE PLANNING  Goal: Discharge to home or other facility with appropriate resources  Description: INTERVENTIONS:  - Identify barriers to discharge w/patient and caregiver  - Arrange for needed discharge resources and transportation as appropriate  - Identify discharge learning needs (meds, wound care, etc )  - Arrange for interpretive services to assist at discharge as needed  - Refer to Case Management Department for coordinating discharge planning if the patient needs post-hospital services based on physician/advanced practitioner order or complex needs related to functional status, cognitive ability, or social support system  Outcome: Progressing     Problem: Knowledge Deficit  Goal: Patient/family/caregiver demonstrates understanding of disease process, treatment plan, medications, and discharge instructions  Description: Complete learning assessment and assess knowledge base    Interventions:  - Provide teaching at level of understanding  - Provide teaching via preferred learning methods  Outcome: Progressing     Problem: CARDIOVASCULAR - ADULT  Goal: Maintains optimal cardiac output and hemodynamic stability  Description: INTERVENTIONS:  - Monitor I/O, vital signs and rhythm  - Monitor for S/S and trends of decreased cardiac output  - Administer and titrate ordered vasoactive medications to optimize hemodynamic stability  - Assess quality of pulses, skin color and temperature  - Assess for signs of decreased coronary artery perfusion  - Instruct patient to report change in severity of symptoms  Outcome: Progressing  Goal: Absence of cardiac dysrhythmias or at baseline rhythm  Description: INTERVENTIONS:  - Continuous cardiac monitoring, vital signs, obtain 12 lead EKG if ordered  - Administer antiarrhythmic and heart rate control medications as ordered  - Monitor electrolytes and administer replacement therapy as ordered  Outcome: Progressing     Problem: RESPIRATORY - ADULT  Goal: Achieves optimal ventilation and oxygenation  Description: INTERVENTIONS:  - Assess for changes in respiratory status  - Assess for changes in mentation and behavior  - Position to facilitate oxygenation and minimize respiratory effort  - Oxygen administered by appropriate delivery if ordered  - Initiate smoking cessation education as indicated  - Encourage broncho-pulmonary hygiene including cough, deep breathe, Incentive Spirometry  - Assess the need for suctioning and aspirate as needed  - Assess and instruct to report SOB or any respiratory difficulty  - Respiratory Therapy support as indicated  Outcome: Progressing     Problem: HEMATOLOGIC - ADULT  Goal: Maintains hematologic stability  Description: INTERVENTIONS  - Assess for signs and symptoms of bleeding or hemorrhage  - Monitor labs  - Administer supportive blood products/factors as ordered and appropriate  Outcome: Progressing     Problem: Potential for Falls  Goal: Patient will remain free of falls  Description: INTERVENTIONS:  - Assess patient frequently for physical needs  -  Identify cognitive and physical deficits and behaviors that affect risk of falls    -  [place] fall precautions as indicated by assessment   - Educate patient/family on patient safety including physical limitations  - Instruct patient to call for assistance with activity based on assessment  - Modify environment to reduce risk of injury  - Consider OT/PT consult to assist with strengthening/mobility  Outcome: Progressing

## 2021-01-03 NOTE — ASSESSMENT & PLAN NOTE
· Was recently admitted from 12/25-12/27 for COVID PNA  States since getting home has had increased coughing and SOB  ALso noted he began having chest pressure on day of admission  (+) diarrhea x 1 day  · Was discharged home on Duracef  Procalcitonin prior to discharge was 1 24  Will recheck this am   · CXR reveals worsening infiltrate  · Procalcitonin noted elevated  · Suspected superimposed bacterial infarction   · Completed 5 days of IV ceftriaxone and doxycycline  · Completed 5 days off remdesivir treatment  · Currently reports feeling much better  O2 saturation % on room air  · Will discharge home on multivitamins  · Recommended repeat blood work within 1 week  · Recommended repeat chest x-ray in 6 weeks with primary care provider to monitor resolution  · Patient is agreeable with above plan

## 2021-01-05 NOTE — UTILIZATION REVIEW
Elie Limon MD   Physician   Hospitalist   Discharge Summary      Signed   Date of Service:  1/3/2021  2:52 PM               Signed             Show:Clear all  [x]Manual[x]Template[x]Copied    Added by:  Akilah Cotto MD    []Padmini for details        Discharge- Iain Tejeda 1955, 77 y o  male MRN: [id number]     Unit/Bed#: -01 Encounter: [id number]     Primary Care Provider: Sena Bynum MD   Date and time admitted to hospital: 12/29/2020  1:38 AM           * Pneumonia due to COVID-19 virus  Assessment & Plan  · Was recently admitted from 12/25-12/27 for COVID PNA  States since getting home has had increased coughing and SOB  ALso noted he began having chest pressure on day of admission  (+) diarrhea x 1 day  · Was discharged home on Duracef  Procalcitonin prior to discharge was 1 24  Will recheck this am   · CXR reveals worsening infiltrate  · Procalcitonin noted elevated      ·  superimposed bacterial pneumonia  · Completed 5 days of IV ceftriaxone and doxycycline  · Completed 5 days off remdesivir treatment  · Currently reports feeling much better  O2 saturation % on room air  · Will discharge home on multivitamins  · Recommended repeat blood work within 1 week  · Recommended repeat chest x-ray in 6 weeks with primary care provider to monitor resolution  · Patient is agreeable with above plan      Chest pressure  Assessment & Plan  · Report chest pressure beginning on day of admission  States pain does worsen with coughing  (+) COVID as above  · Initial troponin 0 05 x2 subsequently downtrended 0 04 x 2  · Currently asymptomatic         Elevated troponin  Assessment & Plan  · Initial troponin slightly elevated at 0 05 as above  · Downtrended 0 04 x 2 now  · May be troponin leak 2/2 viral infection  · Currently asymptomatic      Insomnia  Assessment & Plan  · Improved    · Continue Robitussin for cough and comfort      Thrombocytopenia (HCC)  Assessment & Plan  · Platelets stable at 94 on admission   · Currently 76   · No s/s of bleeding  · Likely 2/2 alcoholic liver disease vs viral infection as above  · Outpatient follow-up      Anemia  Assessment & Plan  · Hemoglobin stable around 8-9 range  · No signs of active bleeding noted  · Hemodynamically stable      Alcoholic cirrhosis ([address] )  Assessment & Plan  Present admission with history of alcoholic cirrhosis  MELD score 11  AFP within normal limits  Hepatitis panel negative  Not encephalopathic  Recommended close follow-up with Gastroenterology outpatient  Patient is agreeable plan      Benign essential HTN  Assessment & Plan  · BP adequately controlled on current regimen  · Continue home dose Cardizem, Losartan, Maxzide  · Outpatient follow-up with primary care provider  · Patient is agreeable with above plan      Type 2 diabetes mellitus [facility])  Assessment & Plan        Lab Results   Component Value Date     HGBA1C 9 6 (H) 12/26/2020                Recent Labs     01/02/21  1558 01/02/21  2049 01/03/21  0745 01/03/21  1105   POCGLU 300* 284* 219* 284*         Blood Sugar Average: Last 72 hrs:  (P) 239 [id number]   · home meds includes Amaryl and Glyxembi   · Resume home medication upon discharge  · Recommended close follow-up with primary care provider for tighter glycemic control  · Patient is agreeable with above plan      Alcohol abuse  Assessment & Plan  · States has not had any alcohol since 12/24     · Denies any withdrawal symptoms  · CIWA 0  · Continue folic acid and thiamine  · Counseled on cessation            Discharging Physician / Practitioner: Kathryn Portillo MD  PCP: Alfredo Calloway MD  Admission Date:       Admission Orders (From admission, onward)              Ordered          12/30/20 1525   Inpatient Admission  Once           12/29/20 0343   Place in Observation  Once                       Discharge Date: 01/03/21          Resolved Problems  Date Reviewed: 1/3/2021     None                Reason for Admission:  Pneumonia due to COVID-19 infection as well as superimposed bacterial pneumonia      Hospital Course:      Clara Lao is a 77 y o  male patient with past medical history of alcohol use, diabetes, hypertension, alcoholic cirrhosis, anemia, thrombocytopenia, was recently discharged from [facility] on 12/27/2020 after being diagnosed and treated for COVID-19 pneumonia  Patient was on mild treatment protocol and discharged home on vitamins supplement and Cefadroxil due to elevated Procal  who again presented to the hospital on 12/29/2020 complaining of increasing shortness of breath, chest pressure, diarrhea  Patient reported that since getting home he had experienced worsening of his symptoms which prompted her to come to emergency room again  Chest x-ray report reviewed and noted for worsening bilateral ground-glass opacities  Procalcitonin is also noted elevated  Patient has completed 5 days of IV ceftriaxone, IV doxycycline, completed 5 days of  IV remdesivir treatment  Also continue multivitamins while inpatient  As patient was on my protocol with D-dimer around 0 73 will not dc on Anticoagulation  Currently O2 saturation % on room air  Procalcitonin downtrended  Initial troponin 0 05 x 2 noncardiac in origin and  due to PNA and downtrended 0 04x2  Currently patient denies chest pain, dyspnea, fever, chills, nausea vomiting, any new complaints  Reports feeling much better and eager to go home  Recommended to have close follow-up with primary care provider for repeat blood work within 1 week for CBC and CMP as well as repeat chest x-ray in 6 weeks to monitor resolution  Recommended close follow-up with Gastroenterology for cirrhosis as stated above  Currently hemodynamically stable for discharge  Patient verbalized in the settings of above stated outpatient follow-up plans  No other events reported    Refer to earlier notes for further clarification         Please see above list of diagnoses and related plan for additional information       Condition at Discharge: good      Discharge Day Visit / Exam:      Subjective:  Afebrile, hemodynamically stable  O2 saturation % room air  Appears comfortable not in distress  Denies chest pain, dyspnea, fever, chills, nausea, vomiting, abdominal pain, dysuria, diarrhea, any new complaints  No other events reported  Stephanie Paolo to go home         Vitals: Blood Pressure: 104/59 (01/03/21 1401)  Pulse: 73 (01/03/21 1401)  Temperature: 98 4 °F (36 9 °C) (01/03/21 1401)  Temp Source: Oral (01/03/21 0545)  Respirations: 18 (01/03/21 1401)  Height: 5' 11" (180 3 cm) (12/29/20 0300)  Weight - Scale: 110 kg (243 lb 2 7 oz) (01/03/21 0600)  SpO2: 97 % (01/03/21 1401)  Exam:   Physical Exam  Constitutional:       General: He is not in acute distress  Appearance: Normal appearance  He is not ill-appearing  Comments: Obese male patient not in distress  Eyes:      Extraocular Movements: Extraocular movements intact  Neck:      Musculoskeletal: Normal range of motion and neck supple  Cardiovascular:      Rate and Rhythm: Normal rate and regular rhythm  Pulses: Normal pulses  Heart sounds: Normal heart sounds  Pulmonary:      Effort: Pulmonary effort is normal  No respiratory distress  Breath sounds: Normal breath sounds  No wheezing  Comments: not in distress  Abdominal:      General: There is no distension  Tenderness: There is no abdominal tenderness  Musculoskeletal: Normal range of motion  Neurological:      General: No focal deficit present  Mental Status: He is alert and oriented to person, place, and time  Mental status is at baseline     Psychiatric:         Mood and Affect: Mood normal          Behavior: Behavior normal          Discussion with Family:  Offered to call family but patient reports that he has been contact with family      Discharge instructions/Information to patient and family:   See after visit summary for information provided to patient and family        Provisions for Follow-Up Care:  See after visit summary for information related to follow-up care and any pertinent home health orders        Disposition:      Home     For Discharges to [address] [facility]:   · Not Applicable to this Patient - Not Applicable to this Patient     Planned Readmission: none     Discharge Statement:  I spent 35 minutes discharging the patient  This time was spent on the day of discharge  I had direct contact with the patient on the day of discharge  Greater than 50% of the total time was spent examining patient, answering all patient questions, arranging and discussing plan of care with patient as well as directly providing post-discharge instructions    Additional time then spent on discharge activities      Discharge Medications:  See after visit summary for reconciled discharge medications provided to patient and family        ** Please Note: This note has been constructed using a voice recognition system **                                 Routing History

## 2021-04-29 NOTE — ASSESSMENT & PLAN NOTE
17w5d  Chart reviewed.  Routine OB 4/13/21.  WNL.    Response to patient.     · Platelets stable at 94 on admission   · Currently 74   · No s/s of bleeding  · Likely 2/2 alcoholic liver disease vs viral infection as above  · Outpatient follow-up

## 2021-07-15 ENCOUNTER — OFFICE VISIT (OUTPATIENT)
Dept: WOUND CARE | Facility: HOSPITAL | Age: 66
End: 2021-07-15
Payer: COMMERCIAL

## 2021-07-15 VITALS
RESPIRATION RATE: 16 BRPM | DIASTOLIC BLOOD PRESSURE: 72 MMHG | BODY MASS INDEX: 33.32 KG/M2 | SYSTOLIC BLOOD PRESSURE: 132 MMHG | WEIGHT: 238 LBS | HEART RATE: 53 BPM | HEIGHT: 71 IN | TEMPERATURE: 97.2 F

## 2021-07-15 DIAGNOSIS — E11.40 TYPE 2 DIABETES MELLITUS WITH DIABETIC NEUROPATHY, UNSPECIFIED WHETHER LONG TERM INSULIN USE (HCC): ICD-10-CM

## 2021-07-15 DIAGNOSIS — L97.529: Primary | ICD-10-CM

## 2021-07-15 PROCEDURE — 11042 DBRDMT SUBQ TIS 1ST 20SQCM/<: CPT | Performed by: PODIATRIST

## 2021-07-15 PROCEDURE — 99212 OFFICE O/P EST SF 10 MIN: CPT | Performed by: PODIATRIST

## 2021-07-15 PROCEDURE — 99213 OFFICE O/P EST LOW 20 MIN: CPT | Performed by: PODIATRIST

## 2021-07-15 RX ORDER — LIDOCAINE HYDROCHLORIDE 40 MG/ML
5 SOLUTION TOPICAL ONCE
Status: COMPLETED | OUTPATIENT
Start: 2021-07-15 | End: 2021-07-15

## 2021-07-15 RX ADMIN — LIDOCAINE HYDROCHLORIDE 5 ML: 40 SOLUTION TOPICAL at 08:30

## 2021-07-15 NOTE — PATIENT INSTRUCTIONS
Orders Placed This Encounter   Procedures    Wound cleansing and dressings     Left great toe wound     Wash your hands with soap and water  Remove old dressing, discard into plastic bag and place in trash  Cleanse the wound with normal saline or mild soap and water prior to applying a clean dressing  Do not use tissue or cotton balls  Do not scrub the wound  Pat dry using gauze  Shower only with cast cover      Apply dermagran gauze to wound  Cover with gauze  Secure with tape   Change dressing daily   This was performed at wound care center today     Standing Status:   Future     Standing Expiration Date:   7/15/2022    Wound off loading     Off-loading Instructions:    Keep weight and pressure off wound at all times  and Wear off-loading device as directed by your physician (wedge shoe)  Put on immediately when rising in the morning and remove when going to bed       Standing Status:   Future     Standing Expiration Date:   7/15/2022

## 2021-07-15 NOTE — PROGRESS NOTES
Patient ID: Doug Melendez is a 77 y o  male Date of Birth 1955     Chief Complaint  Chief Complaint   Patient presents with    New Patient Visit     left great toe wound        Allergies  Sulfa antibiotics    Assessment:    No problem-specific Assessment & Plan notes found for this encounter  Diagnoses and all orders for this visit:    Skin ulcer of great toe, left, with unspecified severity ([address] )  -     Wound cleansing and dressings; Future  -     Wound off loading; Future  -     lidocaine (XYLOCAINE) 4 % topical solution 5 mL  -     Debridement    Type 2 diabetes mellitus with diabetic neuropathy, unspecified whether long term insulin use (HCC)  -     Debridement              Debridement   Wound 07/15/21 Diabetic Ulcer Toe (Comment  which one) Left;Plantar    Universal Protocol:  Consent: Verbal consent obtained  Risks and benefits: risks, benefits and alternatives were discussed  Consent given by: patient  Time out: Immediately prior to procedure a "time out" was called to verify the correct patient, procedure, equipment, support staff and site/side marked as required  Timeout called at: 7/15/2021 8:56 AM   Patient understanding: patient states understanding of the procedure being performed  Patient identity confirmed: verbally with patient      Performed by: physician  Debridement type: surgical  Level of debridement: subcutaneous tissue  Pain control: lidocaine 4%  Post-debridement measurements  Length (cm): 0 5  Width (cm): 0 5  Depth (cm): 0 3  Percent debrided: 100%  Surface Area (cm^2): 0 25  Area debrided (cm^2): 0 25  Volume (cm^3): 0 08  Tissue and other material debrided: dermis, epidermis and subcutaneous tissue  Devitalized tissue debrided: callus, fibrin and slough  Instrument(s) utilized: blade  Bleeding: medium  Hemostasis obtained with: pressure  Procedural pain (0-10): 0  Post-procedural pain: 0   Response to treatment: procedure was tolerated well          Plan:  1   Reviewed previous wound care note  Patient was counseled and educated on the condition and the diagnosis  2  Treat him with serial debridement and dermagren gazue  3  Orthowedge shoe for left foot  Discussed possible total contact cast    4   I am to be called if any signs of infection develop such as erythema, increased wound size or significant change in appearance of wound, odor, pain, increased or change in color of drainage, swelling, fever, chills, nightsweats  5  RA in 2 weeks  Wound 07/15/21 Diabetic Ulcer Toe (Comment  which one) Left;Plantar (Active)   Wound Image Images linked 07/15/21 0847   Wound Description Beefy red 07/15/21 0823   Jenn-wound Assessment Dry;Callus 07/15/21 0823   Wound Length (cm) 0 4 cm 07/15/21 0823   Wound Width (cm) 0 3 cm 07/15/21 0823   Wound Depth (cm) 0 3 cm 07/15/21 0823   Wound Surface Area (cm^2) 0 12 cm^2 07/15/21 0823   Wound Volume (cm^3) 0 036 cm^3 07/15/21 0823   Calculated Wound Volume (cm^3) 0 04 cm^3 07/15/21 0823   Drainage Amount Small 07/15/21 0823   Drainage Description Serosanguineous 07/15/21 0823   Non-staged Wound Description Full thickness 07/15/21 0823       Wound 07/15/21 Diabetic Ulcer Toe (Comment  which one) Left;Plantar (Active)   Date First Assessed/Time First Assessed: 07/15/21 0818   Primary Wound Type: Diabetic Ulcer  Location: Toe (Comment  which one)  Wound Location Orientation: Left;Plantar       Subjective:        HPI  [employer] presents with his wife for left great toe ulcer  He has recurring ulcer on left great toe  It started about 3 weeks ago  No pain related to left great toe ulcer  No redness  His wife was using [employer] Lambing  He did not obtain DM shoes  He did not follow-up in my office after discharged from wound center in December last year         The following portions of the patient's history were reviewed and updated as appropriate: allergies, current medications, past family history, past medical history, past social history, past surgical history and problem list     Review of Systems   Constitutional: Negative for chills and fever  Respiratory: Negative for shortness of breath  Cardiovascular: Negative for chest pain  Gastrointestinal: Negative for diarrhea, nausea and vomiting  Skin: Positive for wound  Objective:       Wound 07/15/21 Diabetic Ulcer Toe (Comment  which one) Left;Plantar (Active)   Wound Image Images linked 07/15/21 0847   Wound Description Beefy red 07/15/21 0823   Jenn-wound Assessment Dry;Callus 07/15/21 0823   Wound Length (cm) 0 4 cm 07/15/21 0823   Wound Width (cm) 0 3 cm 07/15/21 0823   Wound Depth (cm) 0 3 cm 07/15/21 0823   Wound Surface Area (cm^2) 0 12 cm^2 07/15/21 0823   Wound Volume (cm^3) 0 036 cm^3 07/15/21 0823   Calculated Wound Volume (cm^3) 0 04 cm^3 07/15/21 0823   Drainage Amount Small 07/15/21 0823   Drainage Description Serosanguineous 07/15/21 0823   Non-staged Wound Description Full thickness 07/15/21 0823       /72   Pulse (!) 53   Temp (!) 97 2 °F (36 2 °C)   Resp 16   Ht 5' 11" (1 803 m)   Wt 108 kg (238 lb)   BMI 33 19 kg/m²     Physical Exam  Vitals and nursing note reviewed  Constitutional:       General: He is not in acute distress  Appearance: Normal appearance  He is not ill-appearing or toxic-appearing  Cardiovascular:      Rate and Rhythm: Normal rate and regular rhythm  Pulses: Normal pulses  Pulmonary:      Effort: Pulmonary effort is normal  No respiratory distress  Musculoskeletal:         General: Deformity present  No tenderness or signs of injury  Skin:     General: Skin is warm  Coloration: Skin is not cyanotic or mottled  Findings: Wound present  No abscess, erythema or rash  Nails: There is no clubbing  Comments: Ulcer presents left hallux IPJ  Wound bed is fibrous  Periwound callus noted  No redness or purulence  No deep probing  See wound assessments       Neurological:      General: No focal deficit present  Mental Status: He is alert and oriented to person, place, and time  Cranial Nerves: No cranial nerve deficit  Sensory: Sensory deficit present  Coordination: Coordination normal    Psychiatric:         Mood and Affect: Mood normal          Behavior: Behavior normal          Thought Content: Thought content normal          Judgment: Judgment normal            Wound Instructions:  Orders Placed This Encounter   Procedures    Wound cleansing and dressings     Left great toe wound     Wash your hands with soap and water  Remove old dressing, discard into plastic bag and place in trash  Cleanse the wound with normal saline or mild soap and water prior to applying a clean dressing  Do not use tissue or cotton balls  Do not scrub the wound  Pat dry using gauze  Shower only with cast cover      Apply dermagran gauze to wound  Cover with gauze  Secure with tape   Change dressing daily   This was performed at wound care center today     Standing Status:   Future     Standing Expiration Date:   7/15/2022    Wound off loading     Off-loading Instructions:    Keep weight and pressure off wound at all times  and Wear off-loading device as directed by your physician (wedge shoe)  Put on immediately when rising in the morning and remove when going to bed  Standing Status:   Future     Standing Expiration Date:   7/15/2022    Debridement     This order was created via procedure documentation        Diagnosis ICD-10-CM Associated Orders   1  Skin ulcer of great toe, left, with unspecified severity ([facility])  L97 529 Wound cleansing and dressings     Wound off loading     lidocaine (XYLOCAINE) 4 % topical solution 5 mL     Debridement   2   Type 2 diabetes mellitus with diabetic neuropathy, unspecified whether long term insulin use ([facility])  E11 40 Debridement

## 2021-07-29 ENCOUNTER — OFFICE VISIT (OUTPATIENT)
Dept: WOUND CARE | Facility: HOSPITAL | Age: 66
End: 2021-07-29
Payer: COMMERCIAL

## 2021-07-29 VITALS
RESPIRATION RATE: 16 BRPM | HEART RATE: 56 BPM | DIASTOLIC BLOOD PRESSURE: 58 MMHG | SYSTOLIC BLOOD PRESSURE: 133 MMHG | TEMPERATURE: 97.6 F

## 2021-07-29 DIAGNOSIS — E11.40 TYPE 2 DIABETES MELLITUS WITH DIABETIC NEUROPATHY, UNSPECIFIED WHETHER LONG TERM INSULIN USE (HCC): ICD-10-CM

## 2021-07-29 DIAGNOSIS — L97.529: Primary | ICD-10-CM

## 2021-07-29 PROCEDURE — 99212 OFFICE O/P EST SF 10 MIN: CPT | Performed by: PODIATRIST

## 2021-07-29 PROCEDURE — 99213 OFFICE O/P EST LOW 20 MIN: CPT | Performed by: PODIATRIST

## 2021-07-29 RX ORDER — LIDOCAINE HYDROCHLORIDE 40 MG/ML
5 SOLUTION TOPICAL ONCE
Status: COMPLETED | OUTPATIENT
Start: 2021-07-29 | End: 2021-07-29

## 2021-07-29 RX ADMIN — LIDOCAINE HYDROCHLORIDE 5 ML: 40 SOLUTION TOPICAL at 08:45

## 2021-07-29 NOTE — PROGRESS NOTES
Patient ID: Elia Sullivan is a 77 y o  male Date of Birth 1955     Chief Complaint  Chief Complaint   Patient presents with    Follow Up Wound Care Visit     left great toe wound        Allergies  Sulfa antibiotics    Assessment:    No problem-specific Assessment & Plan notes found for this encounter  Diagnoses and all orders for this visit:    Skin ulcer of great toe, left, with unspecified severity (HCC)  -     lidocaine (XYLOCAINE) 4 % topical solution 5 mL  -     Wound cleansing and dressings; Future  -     Wound off loading; Future    Type 2 diabetes mellitus with diabetic neuropathy, unspecified whether long term insulin use (HCC)  -     Wound cleansing and dressings; Future  -     Wound off loading; Future              Procedures    Plan:  1  Reviewed previous wound care note  Patient was counseled and educated on the condition and the diagnosis  2   Wound is maris well  Periwound callus removed  Continue dermagren gazue  3  Orthowedge shoe for left foot  Discussed possible total contact cast    4   I am to be called if any signs of infection develop such as erythema, increased wound size or significant change in appearance of wound, odor, pain, increased or change in color of drainage, swelling, fever, chills, nightsweats  5  RA in 2-3 weeks  Wound 07/15/21 Diabetic Ulcer Toe (Comment  which one) Left;Plantar (Active)   Wound Image Images linked 07/29/21 0835   Wound Description Other (Comment); Pink (dark red) 07/29/21 0843   Jenn-wound Assessment Dry;Callus 07/29/21 0843   Wound Length (cm) 0 4 cm 07/29/21 0843   Wound Width (cm) 0 2 cm 07/29/21 0843   Wound Depth (cm) 0 1 cm 07/29/21 0843   Wound Surface Area (cm^2) 0 08 cm^2 07/29/21 0843   Wound Volume (cm^3) 0 008 cm^3 07/29/21 0843   Calculated Wound Volume (cm^3) 0 01 cm^3 07/29/21 0843   Change in Wound Size % 75 07/29/21 0843   Drainage Amount None 07/29/21 0843   Non-staged Wound Description Full thickness 07/29/21 0843       Wound 07/15/21 Diabetic Ulcer Toe (Comment  which one) Left;Plantar (Active)   Date First Assessed/Time First Assessed: 07/15/21 0818   Primary Wound Type: Diabetic Ulcer  Location: Toe (Comment  which one)  Wound Location Orientation: Left;Plantar       Subjective:        HPI  Wilhemena William presents for left great toe ulcer  He is doing well  No foot pain  No redness  Decreased drainage  He presents with orthowedge shoe  The following portions of the patient's history were reviewed and updated as appropriate: allergies, current medications, past family history, past medical history, past social history, past surgical history and problem list     Review of Systems   Constitutional: Negative for chills and fever  Respiratory: Negative for shortness of breath  Cardiovascular: Negative for chest pain  Gastrointestinal: Negative for diarrhea, nausea and vomiting  Skin: Positive for wound  Objective:       Wound 07/15/21 Diabetic Ulcer Toe (Comment  which one) Left;Plantar (Active)   Wound Image Images linked 07/29/21 0835   Wound Description Other (Comment); Pink (dark red) 07/29/21 0843   Jenn-wound Assessment Dry;Callus 07/29/21 0843   Wound Length (cm) 0 4 cm 07/29/21 0843   Wound Width (cm) 0 2 cm 07/29/21 0843   Wound Depth (cm) 0 1 cm 07/29/21 0843   Wound Surface Area (cm^2) 0 08 cm^2 07/29/21 0843   Wound Volume (cm^3) 0 008 cm^3 07/29/21 0843   Calculated Wound Volume (cm^3) 0 01 cm^3 07/29/21 0843   Change in Wound Size % 75 07/29/21 0843   Drainage Amount None 07/29/21 0843   Non-staged Wound Description Full thickness 07/29/21 0843       /58   Pulse 56   Temp 97 6 °F (36 4 °C)   Resp 16     Physical Exam  Vitals and nursing note reviewed  Constitutional:       General: He is not in acute distress  Appearance: He is not ill-appearing or toxic-appearing  Cardiovascular:      Rate and Rhythm: Normal rate and regular rhythm  Pulses: Normal pulses  Pulmonary:      Effort: Pulmonary effort is normal  No respiratory distress  Musculoskeletal:         General: Deformity present  No tenderness or signs of injury  Skin:     General: Skin is warm  Coloration: Skin is not cyanotic or mottled  Findings: Wound present  No abscess, erythema or rash  Nails: There is no clubbing  Comments: Ulcer presents left hallux IPJ  Decrease in size and depth  Wound bed is granular  Mild periwound callus noted  No redness or purulence  No deep probing  See wound assessments  Neurological:      General: No focal deficit present  Mental Status: He is alert and oriented to person, place, and time  Cranial Nerves: No cranial nerve deficit  Sensory: Sensory deficit present  Coordination: Coordination normal    Psychiatric:         Mood and Affect: Mood normal          Behavior: Behavior normal          Thought Content: Thought content normal          Judgment: Judgment normal            Wound Instructions:  Orders Placed This Encounter   Procedures    Wound cleansing and dressings     Left great toe wound      Wash your hands with soap and water  Remove old dressing, discard into plastic bag and place in trash  Cleanse the wound with normal saline or mild soap and water prior to applying a clean dressing  Do not use tissue or cotton balls  Do not scrub the wound  Pat dry using gauze  Shower only with cast cover        Apply dermagran gauze to wound  Cover with gauze  Secure with tape   Change dressing daily   This was performed at wound care center today                    Standing Status:   Future     Standing Expiration Date:   7/29/2022    Wound off loading      Wound off loading      Off-loading Instructions:     Keep weight and pressure off wound at all times  and Wear off-loading device as directed by your physician (wedge shoe)  Put on immediately when rising in the morning and remove when going to bed  Standing Status:   Future     Standing Expiration Date:   7/29/2022        Diagnosis ICD-10-CM Associated Orders   1  Skin ulcer of great toe, left, with unspecified severity ([facility])  L97 529 lidocaine (XYLOCAINE) 4 % topical solution 5 mL     Wound cleansing and dressings     Wound off loading   2   Type 2 diabetes mellitus with diabetic neuropathy, unspecified whether long term insulin use ([facility])  E11 40 Wound cleansing and dressings     Wound off loading

## 2021-07-29 NOTE — PATIENT INSTRUCTIONS
Orders Placed This Encounter   Procedures    Wound cleansing and dressings     Left great toe wound      Wash your hands with soap and water  Remove old dressing, discard into plastic bag and place in trash  Cleanse the wound with normal saline or mild soap and water prior to applying a clean dressing  Do not use tissue or cotton balls  Do not scrub the wound  Pat dry using gauze  Shower only with cast cover        Apply dermagran gauze to wound  Cover with gauze  Secure with tape   Change dressing daily   This was performed at wound care center today                    Standing Status:   Future     Standing Expiration Date:   7/29/2022    Wound off loading      Wound off loading      Off-loading Instructions:     Keep weight and pressure off wound at all times  and Wear off-loading device as directed by your physician (wedge shoe)  Put on immediately when rising in the morning and remove when going to bed       Standing Status:   Future     Standing Expiration Date:   7/29/2022

## 2021-08-19 ENCOUNTER — OFFICE VISIT (OUTPATIENT)
Dept: WOUND CARE | Facility: HOSPITAL | Age: 66
End: 2021-08-19
Payer: COMMERCIAL

## 2021-08-19 VITALS
HEART RATE: 76 BPM | RESPIRATION RATE: 16 BRPM | SYSTOLIC BLOOD PRESSURE: 134 MMHG | TEMPERATURE: 98.7 F | DIASTOLIC BLOOD PRESSURE: 68 MMHG

## 2021-08-19 DIAGNOSIS — E11.40 TYPE 2 DIABETES MELLITUS WITH DIABETIC NEUROPATHY, UNSPECIFIED WHETHER LONG TERM INSULIN USE (HCC): ICD-10-CM

## 2021-08-19 DIAGNOSIS — L97.529: Primary | ICD-10-CM

## 2021-08-19 PROCEDURE — 99213 OFFICE O/P EST LOW 20 MIN: CPT | Performed by: PODIATRIST

## 2021-08-19 PROCEDURE — 99212 OFFICE O/P EST SF 10 MIN: CPT | Performed by: PODIATRIST

## 2021-08-19 NOTE — PATIENT INSTRUCTIONS
Orders Placed This Encounter   Procedures    Wound cleansing and dressings     The wound is healed  Script given for diabetic shoes with custom inserts  Follow up with Dr Lesley Field in his office in 4 weeks  Thank you for coming to our center!      Standing Status:   Future     Standing Expiration Date:   8/19/2022

## 2021-08-19 NOTE — PROGRESS NOTES
Patient ID: Doug Melendez is a 77 y o  male Date of Birth 1955     Chief Complaint  Chief Complaint   Patient presents with    Follow Up Wound Care Visit     left great toe       Allergies  Sulfa antibiotics    Assessment:    No problem-specific Assessment & Plan notes found for this encounter  Diagnoses and all orders for this visit:    Skin ulcer of great toe, left, with unspecified severity ([facility] [address] )  -     Wound cleansing and dressings; Future  -     Diabetic Shoe  -     Diabetic Shoe Inserts    Type 2 diabetes mellitus with diabetic neuropathy, unspecified whether long term insulin use ([facility] [address] )  -     Diabetic Shoe  -     Diabetic Shoe Inserts              Procedures    Plan:  1  Patient was counseled and educated on the condition and the diagnosis  2  Reviewed previous wound care note and photo  Wound looks healed  Instructed skin instruction and protection  3  Referred him to  for DM shoes and inserts  4  Discussed possible surgical correction of deformity if wound re-occurs  5  D/C him from [facility]  Pt to follow-up at my office in 4 weeks        [REMOVED] Wound 07/15/21 Diabetic Ulcer Toe (Comment  which one) Left;Plantar (Removed)   Wound Image Images linked 08/19/21 0836   Wound Description Epithelialization 08/19/21 0844   Wound Length (cm) 0 cm 08/19/21 0844   Wound Width (cm) 0 cm 08/19/21 0844   Wound Depth (cm) 0 cm 08/19/21 0844   Wound Surface Area (cm^2) 0 cm^2 08/19/21 0844   Wound Volume (cm^3) 0 cm^3 08/19/21 0844   Calculated Wound Volume (cm^3) 0 cm^3 08/19/21 0844   Change in Wound Size % 100 08/19/21 0844   Drainage Amount None 08/19/21 0844       [REMOVED] Wound 07/15/21 Diabetic Ulcer Toe (Comment  which one) Left;Plantar (Removed)   Resolved Date/Resolved Time: 08/19/21 0845  Date First Assessed/Time First Assessed: 07/15/21 0818   Primary Wound Type: Diabetic Ulcer  Location: Toe (Comment  which one)  Wound Location Orientation: Left;Plantar  Wound Outcome: Healed Subjective:        RYAN Jimenez presents for left great toe ulcer  He presents with regular shoes today  He feels well  No pain  Wound looks healed  No drainage  No redness or edema  BS under control  The following portions of the patient's history were reviewed and updated as appropriate: allergies, current medications, past family history, past medical history, past social history, past surgical history and problem list       PAST MEDICAL HISTORY:  Past Medical History:   Diagnosis Date    Arrhythmia     Diabetes mellitus ([address] )     History of echocardiogram 11/14/2017    showed EF of 50-55 percentWith moderate LVH and left ventricle diastolic dysfunction  Left atrium was moderately enlarged  Trace MR noted   History of Holter monitoring 11/21/2017    showed baseline rhythm of sinus origin with an average heart it of 61 bpm  The lowest heart rate was 49 and the highest heart rate was 10 8 bpm  There were rare single VPCs, and frequent PACs representing 3 2% of total beats  There were several episodes of sinus arrhythmias with sinus bradycardia and heart rate ranging from 40-90 bpm  No sustained dysrhythmias, or pauses noted  The patient did not    Hypertension     Obese        PAST SURGICAL HISTORY:  Past Surgical History:   Procedure Laterality Date    EYE SURGERY Left 1997    HERNIA REPAIR  [id number]    KNEE ARTHROPLASTY Right 2008    SHOULDER SURGERY Right 2002        ALLERGIES:  Sulfa antibiotics    MEDICATIONS:  Current Outpatient Medications   Medication Sig Dispense Refill    cholecalciferol (VITAMIN D3) 1,000 units tablet Take 2 tablets (2,000 Units total) by mouth daily 60 tablet 0    diltiazem (CARDIZEM CD) 180 mg 24 hr capsule       ferrous sulfate 325 (65 Fe) mg tablet Take 1 tablet (325 mg total) by mouth daily with breakfast 30 tablet 0    fluticasone-salmeterol (ADVAIR HFA) 115-21 MCG/ACT inhaler Inhale 2 puffs 2 (two) times a day Rinse mouth after use   (Patient not taking: Reported on )      folic acid (FOLVITE) 1 mg tablet Take 1 tablet (1 mg total) by mouth daily 30 tablet 0    glimepiride (AMARYL) 4 mg tablet 2 (two) times a day       Glyxambi 25-5 MG TABS  (Patient not taking: Reported on 7/15/2021)      guaiFENesin (ROBITUSSIN) 100 MG/5ML oral liquid Take 10 mL (200 mg total) by mouth 3 (three) times a day as needed for cough (Patient not taking: Reported on 7/15/2021) 120 mL 0    loratadine (CLARITIN) 10 mg tablet Take 1 tablet (10 mg total) by mouth daily 30 tablet 0    losartan (COZAAR) 50 mg tablet       Multiple Vitamin (multivitamin) capsule Take 1 capsule by mouth daily 21 capsule 0    pantoprazole (PROTONIX) 40 mg tablet Take 1 tablet (40 mg total) by mouth daily On an empty stomatch, at least 30 mins before breakfast 30 tablet 0    promethazine (PHENERGAN) 12 5 mg/10 mL syrup Take by mouth 4 (four) times a day as needed for nausea or vomiting (Patient not taking: Reported on 7/15/2021)      thiamine 100 MG tablet Take 1 tablet (100 mg total) by mouth daily 30 tablet 0    triamterene-hydrochlorothiazide (MAXZIDE-25) 37 5-25 mg per tablet Take 1 tablet by mouth daily       No current facility-administered medications for this visit         SOCIAL HISTORY:  Social History     Socioeconomic History    Marital status: /Civil Union     Spouse name: None    Number of children: 2    Years of education: None    Highest education level: None   Occupational History    None   Tobacco Use    Smoking status: Former Smoker     Packs/day: 0 50     Years: 20 00     Pack years: 10 00     Types: Cigarettes     Quit date:      Years since quittin 6    Smokeless tobacco: Never Used   Vaping Use    Vaping Use: Never used   Substance and Sexual Activity    Alcohol use: Not Currently     Comment: quit     Drug use: Never    Sexual activity: None   Other Topics Concern    None   Social History Narrative    · Most recent tobacco use screenin2018      · Do you currently or have you served in the [employer]:   No      · Live alone or with others:   with others      · High blood pressure: Yes      · Exercise level:   Occasional      · Overweight:   Yes      · Obese: Yes      · Diabetes:   Yes      Social Determinants of Health     Financial Resource Strain:     Difficulty of Paying Living Expenses:    Food Insecurity:     Worried About Running Out of Food in the Last Year:     [address] in the Last Year:    Transportation Needs:     Lack of Transportation (Medical):  Lack of Transportation (Non-Medical):    Physical Activity:     Days of Exercise per Week:     Minutes of Exercise per Session:    Stress:     Feeling of Stress :    Social Connections:     Frequency of Communication with Friends and Family:     Frequency of Social Gatherings with Friends and Family:     Attends Yars[employer] Services:     Active Member of Clubs or Organizations:     Attends Club or Organization Meetings:     Marital Status:    Intimate Partner Violence:     Fear of Current or Ex-Partner:     Emotionally Abused:     Physically Abused:     Sexually Abused:         Review of Systems   Constitutional: Negative for chills and fever  Respiratory: Negative for shortness of breath  Cardiovascular: Negative for chest pain  Gastrointestinal: Negative for diarrhea, nausea and vomiting  Skin: Positive for wound           Objective:       [REMOVED] Wound 07/15/21 Diabetic Ulcer Toe (Comment  which one) Left;Plantar (Removed)   Wound Image Images linked 21 0836   Wound Description Epithelialization 21 0844   Wound Length (cm) 0 cm 21 0844   Wound Width (cm) 0 cm 21 0844   Wound Depth (cm) 0 cm 21 0844   Wound Surface Area (cm^2) 0 cm^2 21 0844   Wound Volume (cm^3) 0 cm^3 21 0844   Calculated Wound Volume (cm^3) 0 cm^3 21 0844   Change in Wound Size % 100 21 0844   Drainage Amount None 08/19/21 0844       /68   Pulse 76   Temp 98 7 °F (37 1 °C)   Resp 16     Physical Exam  Vitals and nursing note reviewed  Constitutional:       General: He is not in acute distress  Appearance: Normal appearance  He is not ill-appearing  Cardiovascular:      Rate and Rhythm: Normal rate and regular rhythm  Pulses: Normal pulses  Pulmonary:      Effort: Pulmonary effort is normal  No respiratory distress  Musculoskeletal:         General: Deformity present  No tenderness or signs of injury  Skin:     General: Skin is warm  Coloration: Skin is not cyanotic or mottled  Findings: Wound present  No erythema or rash  Rash is not purpuric  Nails: There is no clubbing  Comments: Wound is fully closed left hallux IPJ  See wound assessments  Neurological:      General: No focal deficit present  Mental Status: He is alert and oriented to person, place, and time  Cranial Nerves: No cranial nerve deficit  Sensory: Sensory deficit present  Coordination: Coordination normal    Psychiatric:         Mood and Affect: Mood normal          Behavior: Behavior normal          Thought Content: Thought content normal          Judgment: Judgment normal            Wound Instructions:  Orders Placed This Encounter   Procedures    Wound cleansing and dressings     The wound is healed  Script given for diabetic shoes with custom inserts  Follow up with Dr Jose Funk in his office in 4 weeks  Thank you for coming to our center! Standing Status:   Future     Standing Expiration Date:   8/19/2022    Diabetic Shoe    Diabetic Shoe Inserts     Order Specific Question:   Type     Answer:   Custom Fabricated        Diagnosis ICD-10-CM Associated Orders   1  Skin ulcer of great toe, left, with unspecified severity ([facility])  L97 529 Wound cleansing and dressings     Diabetic Shoe     Diabetic Shoe Inserts   2   Type 2 diabetes mellitus with diabetic neuropathy, unspecified whether long term insulin use ([facility])  E11 40 Diabetic Shoe     Diabetic Shoe Inserts

## 2022-03-25 ENCOUNTER — OFFICE VISIT (OUTPATIENT)
Dept: URGENT CARE | Facility: MEDICAL CENTER | Age: 67
End: 2022-03-25
Payer: MEDICARE

## 2022-03-25 VITALS
HEART RATE: 90 BPM | RESPIRATION RATE: 18 BRPM | WEIGHT: 246 LBS | SYSTOLIC BLOOD PRESSURE: 144 MMHG | OXYGEN SATURATION: 96 % | DIASTOLIC BLOOD PRESSURE: 69 MMHG | BODY MASS INDEX: 34.44 KG/M2 | TEMPERATURE: 98 F | HEIGHT: 71 IN

## 2022-03-25 DIAGNOSIS — R05.9 COUGH: Primary | ICD-10-CM

## 2022-03-25 PROCEDURE — 99213 OFFICE O/P EST LOW 20 MIN: CPT | Performed by: PHYSICIAN ASSISTANT

## 2022-03-25 PROCEDURE — G0463 HOSPITAL OUTPT CLINIC VISIT: HCPCS | Performed by: PHYSICIAN ASSISTANT

## 2022-03-25 RX ORDER — AZITHROMYCIN 250 MG/1
TABLET, FILM COATED ORAL
Qty: 6 TABLET | Refills: 0 | Status: SHIPPED | OUTPATIENT
Start: 2022-03-25 | End: 2022-03-29

## 2022-03-25 RX ORDER — BENZONATATE 100 MG/1
100 CAPSULE ORAL 3 TIMES DAILY PRN
Qty: 20 CAPSULE | Refills: 0 | Status: SHIPPED | OUTPATIENT
Start: 2022-03-25 | End: 2022-05-19

## 2022-03-25 NOTE — PATIENT INSTRUCTIONS
Cough   Tessalon Perles as needed for cough  Zithromax as directed  Patient declined COVID test in office  Follow up with PCP in 3-5 days  Proceed to  ER if symptoms worsen

## 2022-03-25 NOTE — PROGRESS NOTES
[address] [facility]        NAME: Cleo Cole is a 74 y o  male  : 1955    MRN: [id number]  DATE: 2022  TIME: 11:53 AM    Assessment and Plan   Cough [R05 9]  1  Cough  azithromycin (ZITHROMAX) 250 mg tablet    benzonatate (TESSALON PERLES) 100 mg capsule         Patient Instructions     Cough   Tessalon Perles as needed for cough  Zithromax as directed  Patient declined COVID test in office  Follow up with PCP in 3-5 days  Proceed to  ER if symptoms worsen  Chief Complaint     Chief Complaint   Patient presents with    Cough     patient states for over a week he has had a productive cough; covid tested negative this morning; concerned because he has a hx of pneumonia          History of Present Illness       78-year-old male presents complaining of cough productive of brown sputum x1 week  States he has been using over-the-counter medications with no relief  Patient has a history of having had COVID 1 year ago along with COVID pneumonia and states he has since been vaccinated for COVID  Patient states he tested at home for COVID which was negative today and declined COVID test in the office  Denies fevers, shortness of breath, chest pain    Cough        Review of Systems   Review of Systems   HENT: Positive for congestion  Respiratory: Positive for cough            Current Medications       Current Outpatient Medications:     azithromycin (ZITHROMAX) 250 mg tablet, Take 2 tablets today then 1 tablet daily x 4 days, Disp: 6 tablet, Rfl: 0    benzonatate (TESSALON PERLES) 100 mg capsule, Take 1 capsule (100 mg total) by mouth 3 (three) times a day as needed for cough, Disp: 20 capsule, Rfl: 0    cholecalciferol (VITAMIN D3) 1,000 units tablet, Take 2 tablets (2,000 Units total) by mouth daily, Disp: 60 tablet, Rfl: 0    diltiazem (CARDIZEM CD) 180 mg 24 hr capsule, , Disp: , Rfl:     ferrous sulfate 325 (65 Fe) mg tablet, Take 1 tablet (325 mg total) by mouth daily with breakfast, Disp: 30 tablet, Rfl: 0    fluticasone-salmeterol (ADVAIR HFA) 115-21 MCG/ACT inhaler, Inhale 2 puffs 2 (two) times a day Rinse mouth after use   (Patient not taking: Reported on 7/15/2021), Disp: , Rfl:     folic acid (FOLVITE) 1 mg tablet, Take 1 tablet (1 mg total) by mouth daily, Disp: 30 tablet, Rfl: 0    glimepiride (AMARYL) 4 mg tablet, 2 (two) times a day , Disp: , Rfl:     Glyxambi 25-5 MG TABS, , Disp: , Rfl:     guaiFENesin (ROBITUSSIN) 100 MG/5ML oral liquid, Take 10 mL (200 mg total) by mouth 3 (three) times a day as needed for cough (Patient not taking: Reported on 7/15/2021), Disp: 120 mL, Rfl: 0    loratadine (CLARITIN) 10 mg tablet, Take 1 tablet (10 mg total) by mouth daily, Disp: 30 tablet, Rfl: 0    losartan (COZAAR) 50 mg tablet, , Disp: , Rfl:     Multiple Vitamin (multivitamin) capsule, Take 1 capsule by mouth daily, Disp: 21 capsule, Rfl: 0    pantoprazole (PROTONIX) 40 mg tablet, Take 1 tablet (40 mg total) by mouth daily On an empty stomatch, at least 30 mins before breakfast, Disp: 30 tablet, Rfl: 0    promethazine (PHENERGAN) 12 5 mg/10 mL syrup, Take by mouth 4 (four) times a day as needed for nausea or vomiting (Patient not taking: Reported on 7/15/2021), Disp: , Rfl:     thiamine 100 MG tablet, Take 1 tablet (100 mg total) by mouth daily, Disp: 30 tablet, Rfl: 0    triamterene-hydrochlorothiazide (MAXZIDE-25) 37 5-25 mg per tablet, Take 1 tablet by mouth daily, Disp: , Rfl:     Current Allergies     Allergies as of 03/25/2022 - Reviewed 03/25/2022   Allergen Reaction Noted    Sulfa antibiotics  11/05/2020            The following portions of the patient's history were reviewed and updated as appropriate: allergies, current medications, past family history, past medical history, past social history, past surgical history and problem list      Past Medical History:   Diagnosis Date    Arrhythmia     Diabetes mellitus ([address] )     History of echocardiogram 11/14/2017    showed EF of 50-55 percentWith moderate LVH and left ventricle diastolic dysfunction  Left atrium was moderately enlarged  Trace MR noted   History of Holter monitoring 11/21/2017    showed baseline rhythm of sinus origin with an average heart it of 61 bpm  The lowest heart rate was 49 and the highest heart rate was 10 8 bpm  There were rare single VPCs, and frequent PACs representing 3 2% of total beats  There were several episodes of sinus arrhythmias with sinus bradycardia and heart rate ranging from 40-90 bpm  No sustained dysrhythmias, or pauses noted  The patient did not    Hypertension     Obese        Past Surgical History:   Procedure Laterality Date    EYE SURGERY Left 1997    HERNIA REPAIR  [id number]    KNEE ARTHROPLASTY Right 2008    SHOULDER SURGERY Right 2002       Family History   Problem Relation Age of Onset    Diabetes Mother     Supraventricular tachycardia Mother     COPD Father     Heart disease Father     Other Father         Sepsis; related to UTI with complicating cardiac problems    Heart disease Paternal Grandmother          Medications have been verified  Objective   /69   Pulse 90   Temp 98 °F (36 7 °C)   Resp 18   Ht 5' 11" (1 803 m)   Wt 112 kg (246 lb)   SpO2 96%   BMI 34 31 kg/m²        Physical Exam     Physical Exam  Constitutional:       General: He is not in acute distress  Appearance: Normal appearance  He is well-developed and normal weight  He is not diaphoretic  HENT:      Head: Normocephalic and atraumatic  Right Ear: Hearing, tympanic membrane, ear canal and external ear normal       Left Ear: Hearing, tympanic membrane, ear canal and external ear normal       Nose: Rhinorrhea present  Right Sinus: No maxillary sinus tenderness or frontal sinus tenderness  Left Sinus: No maxillary sinus tenderness or frontal sinus tenderness  Mouth/Throat:      Pharynx: Uvula midline     Cardiovascular:      Rate and Rhythm: Normal rate and regular rhythm  Heart sounds: Normal heart sounds  Pulmonary:      Effort: Pulmonary effort is normal  No respiratory distress  Breath sounds: Normal breath sounds  No stridor  No wheezing, rhonchi or rales  Chest:      Chest wall: No tenderness  Musculoskeletal:      Cervical back: Normal range of motion and neck supple  Lymphadenopathy:      Cervical: No cervical adenopathy  Neurological:      Mental Status: He is alert

## 2022-03-27 ENCOUNTER — HOSPITAL ENCOUNTER (EMERGENCY)
Facility: HOSPITAL | Age: 67
Discharge: HOME | End: 2022-03-27
Attending: EMERGENCY MEDICINE | Admitting: EMERGENCY MEDICINE
Payer: MEDICARE

## 2022-03-27 VITALS
TEMPERATURE: 99.6 F | RESPIRATION RATE: 18 BRPM | SYSTOLIC BLOOD PRESSURE: 130 MMHG | HEART RATE: 88 BPM | OXYGEN SATURATION: 98 % | DIASTOLIC BLOOD PRESSURE: 77 MMHG

## 2022-03-27 DIAGNOSIS — L03.115 CELLULITIS OF RIGHT THIGH: ICD-10-CM

## 2022-03-27 DIAGNOSIS — L02.214 SOFT TISSUE ABSCESS OF INGUINAL REGION: Primary | ICD-10-CM

## 2022-03-27 LAB
ALBUMIN SERPL BCP-MCNC: 3.2 G/DL (ref 3.5–5)
ALP SERPL-CCNC: 153 U/L (ref 46–116)
ALT SERPL W P-5'-P-CCNC: 44 U/L (ref 12–78)
ANION GAP SERPL CALCULATED.3IONS-SCNC: 8 MMOL/L (ref 4–13)
AST SERPL W P-5'-P-CCNC: 28 U/L (ref 5–45)
BASOPHILS # BLD AUTO: 0.04 THOUSANDS/ΜL (ref 0–0.1)
BASOPHILS NFR BLD AUTO: 1 % (ref 0–1)
BILIRUB SERPL-MCNC: 0.89 MG/DL (ref 0.2–1)
BUN SERPL-MCNC: 26 MG/DL (ref 5–25)
CALCIUM ALBUM COR SERPL-MCNC: 10.1 MG/DL (ref 8.3–10.1)
CALCIUM SERPL-MCNC: 9.5 MG/DL (ref 8.3–10.1)
CHLORIDE SERPL-SCNC: 100 MMOL/L (ref 100–108)
CO2 SERPL-SCNC: 27 MMOL/L (ref 21–32)
CREAT SERPL-MCNC: 1.28 MG/DL (ref 0.6–1.3)
EOSINOPHIL # BLD AUTO: 0.28 THOUSAND/ΜL (ref 0–0.61)
EOSINOPHIL NFR BLD AUTO: 4 % (ref 0–6)
ERYTHROCYTE [DISTWIDTH] IN BLOOD BY AUTOMATED COUNT: 16.1 % (ref 11.6–15.1)
GFR SERPL CREATININE-BSD FRML MDRD: 57 ML/MIN/1.73SQ M
GLUCOSE SERPL-MCNC: 278 MG/DL (ref 65–140)
HCT VFR BLD AUTO: 44.2 % (ref 36.5–49.3)
HGB BLD-MCNC: 13.9 G/DL (ref 12–17)
IMM GRANULOCYTES # BLD AUTO: 0.02 THOUSAND/UL (ref 0–0.2)
IMM GRANULOCYTES NFR BLD AUTO: 0 % (ref 0–2)
LYMPHOCYTES # BLD AUTO: 0.55 THOUSANDS/ΜL (ref 0.6–4.47)
LYMPHOCYTES NFR BLD AUTO: 8 % (ref 14–44)
MCH RBC QN AUTO: 28.8 PG (ref 26.8–34.3)
MCHC RBC AUTO-ENTMCNC: 31.4 G/DL (ref 31.4–37.4)
MCV RBC AUTO: 92 FL (ref 82–98)
MONOCYTES # BLD AUTO: 0.55 THOUSAND/ΜL (ref 0.17–1.22)
MONOCYTES NFR BLD AUTO: 8 % (ref 4–12)
NEUTROPHILS # BLD AUTO: 5.34 THOUSANDS/ΜL (ref 1.85–7.62)
NEUTS SEG NFR BLD AUTO: 79 % (ref 43–75)
NRBC BLD AUTO-RTO: 0 /100 WBCS
PLATELET # BLD AUTO: 89 THOUSANDS/UL (ref 149–390)
PMV BLD AUTO: 12.6 FL (ref 8.9–12.7)
POTASSIUM SERPL-SCNC: 4.4 MMOL/L (ref 3.5–5.3)
PROT SERPL-MCNC: 7.9 G/DL (ref 6.4–8.2)
RBC # BLD AUTO: 4.82 MILLION/UL (ref 3.88–5.62)
SODIUM SERPL-SCNC: 135 MMOL/L (ref 136–145)
WBC # BLD AUTO: 6.78 THOUSAND/UL (ref 4.31–10.16)

## 2022-03-27 PROCEDURE — 80053 COMPREHEN METABOLIC PANEL: CPT | Performed by: EMERGENCY MEDICINE

## 2022-03-27 PROCEDURE — 99284 EMERGENCY DEPT VISIT MOD MDM: CPT | Performed by: EMERGENCY MEDICINE

## 2022-03-27 PROCEDURE — 36415 COLL VENOUS BLD VENIPUNCTURE: CPT | Performed by: EMERGENCY MEDICINE

## 2022-03-27 PROCEDURE — 99283 EMERGENCY DEPT VISIT LOW MDM: CPT

## 2022-03-27 PROCEDURE — 10061 I&D ABSCESS COMP/MULTIPLE: CPT | Performed by: EMERGENCY MEDICINE

## 2022-03-27 PROCEDURE — 96365 THER/PROPH/DIAG IV INF INIT: CPT

## 2022-03-27 PROCEDURE — 85025 COMPLETE CBC W/AUTO DIFF WBC: CPT | Performed by: EMERGENCY MEDICINE

## 2022-03-27 RX ORDER — CEFAZOLIN SODIUM 2 G/50ML
2000 SOLUTION INTRAVENOUS ONCE
Status: COMPLETED | OUTPATIENT
Start: 2022-03-27 | End: 2022-03-27

## 2022-03-27 RX ORDER — DOXYCYCLINE HYCLATE 100 MG/1
100 CAPSULE ORAL 2 TIMES DAILY
Qty: 20 CAPSULE | Refills: 0 | Status: SHIPPED | OUTPATIENT
Start: 2022-03-27 | End: 2022-04-06

## 2022-03-27 RX ORDER — LIDOCAINE HYDROCHLORIDE AND EPINEPHRINE 10; 10 MG/ML; UG/ML
1 INJECTION, SOLUTION INFILTRATION; PERINEURAL ONCE
Status: COMPLETED | OUTPATIENT
Start: 2022-03-27 | End: 2022-03-27

## 2022-03-27 RX ORDER — DOXYCYCLINE HYCLATE 100 MG/1
100 CAPSULE ORAL ONCE
Status: COMPLETED | OUTPATIENT
Start: 2022-03-27 | End: 2022-03-27

## 2022-03-27 RX ADMIN — CEFAZOLIN SODIUM 2000 MG: 2 SOLUTION INTRAVENOUS at 22:23

## 2022-03-27 RX ADMIN — LIDOCAINE HYDROCHLORIDE,EPINEPHRINE BITARTRATE 1 ML: 10; .01 INJECTION, SOLUTION INFILTRATION; PERINEURAL at 21:35

## 2022-03-27 RX ADMIN — DOXYCYCLINE 100 MG: 100 CAPSULE ORAL at 22:43

## 2022-03-28 NOTE — ED PROVIDER NOTES
History  Chief Complaint   Patient presents with    Cyst     Patient c/o of a sore on right leg "at the belt line and I feel it may rupture"  Patient states that the sore began approx 4-5 days ago  History provided by:  Patient  Abscess  Abscess location: Right inguinal region  Abscess quality: fluctuance, induration, painful and redness    Red streaking: no    Duration:  4 days  Progression:  Worsening  Pain details:     Quality:  Aching, dull and hot    Severity:  Severe    Duration:  4 days    Timing:  Constant    Progression:  Worsening  Chronicity:  New  Relieved by:  None tried  Worsened by:  Nothing  Ineffective treatments:  None tried  Associated symptoms: no fever, no headaches, no nausea and no vomiting        Prior to Admission Medications   Prescriptions Last Dose Informant Patient Reported? Taking? Glyxambi 25-5 MG TABS   Yes No   Patient not taking: Reported on 7/15/2021   Multiple Vitamin (multivitamin) capsule   No No   Sig: Take 1 capsule by mouth daily   azithromycin (ZITHROMAX) 250 mg tablet   No No   Sig: Take 2 tablets today then 1 tablet daily x 4 days   benzonatate (TESSALON PERLES) 100 mg capsule   No No   Sig: Take 1 capsule (100 mg total) by mouth 3 (three) times a day as needed for cough   cholecalciferol (VITAMIN D3) 1,000 units tablet   No No   Sig: Take 2 tablets (2,000 Units total) by mouth daily   diltiazem (CARDIZEM CD) 180 mg 24 hr capsule   Yes No   ferrous sulfate 325 (65 Fe) mg tablet   No No   Sig: Take 1 tablet (325 mg total) by mouth daily with breakfast   fluticasone-salmeterol (ADVAIR HFA) 115-21 MCG/ACT inhaler   Yes No   Sig: Inhale 2 puffs 2 (two) times a day Rinse mouth after use     Patient not taking: Reported on 3/11/4075   folic acid (FOLVITE) 1 mg tablet   No No   Sig: Take 1 tablet (1 mg total) by mouth daily   glimepiride (AMARYL) 4 mg tablet   Yes No   Si (two) times a day    guaiFENesin (ROBITUSSIN) 100 MG/5ML oral liquid   No No   Sig: Take 10 mL (200 mg total) by mouth 3 (three) times a day as needed for cough   Patient not taking: Reported on 7/15/2021   loratadine (CLARITIN) 10 mg tablet   No No   Sig: Take 1 tablet (10 mg total) by mouth daily   losartan (COZAAR) 50 mg tablet   Yes No   pantoprazole (PROTONIX) 40 mg tablet   No No   Sig: Take 1 tablet (40 mg total) by mouth daily On an empty stomatch, at least 30 mins before breakfast   promethazine (PHENERGAN) 12 5 mg/10 mL syrup   Yes No   Sig: Take by mouth 4 (four) times a day as needed for nausea or vomiting   Patient not taking: Reported on 7/15/2021   thiamine 100 MG tablet   No No   Sig: Take 1 tablet (100 mg total) by mouth daily   triamterene-hydrochlorothiazide (MAXZIDE-25) 37 5-25 mg per tablet   Yes No   Sig: Take 1 tablet by mouth daily      Facility-Administered Medications: None       Past Medical History:   Diagnosis Date    Arrhythmia     Diabetes mellitus ([facility] [address] )     History of echocardiogram 11/14/2017    showed EF of 50-55 percentWith moderate LVH and left ventricle diastolic dysfunction  Left atrium was moderately enlarged  Trace MR noted   History of Holter monitoring 11/21/2017    showed baseline rhythm of sinus origin with an average heart it of 61 bpm  The lowest heart rate was 49 and the highest heart rate was 10 8 bpm  There were rare single VPCs, and frequent PACs representing 3 2% of total beats  There were several episodes of sinus arrhythmias with sinus bradycardia and heart rate ranging from 40-90 bpm  No sustained dysrhythmias, or pauses noted   The patient did not    Hypertension     Obese        Past Surgical History:   Procedure Laterality Date    EYE SURGERY Left 1997    HERNIA REPAIR  [id number]    KNEE ARTHROPLASTY Right 2008    SHOULDER SURGERY Right 2002       Family History   Problem Relation Age of Onset    Diabetes Mother     Supraventricular tachycardia Mother     COPD Father     Heart disease Father     Other Father         Sepsis; related to UTI with complicating cardiac problems    Heart disease Paternal Grandmother      I have reviewed and agree with the history as documented  E-Cigarette/Vaping    E-Cigarette Use Never User      E-Cigarette/Vaping Substances     Social History     Tobacco Use    Smoking status: Former Smoker     Packs/day: 0 50     Years: 20 00     Pack years: 10 00     Types: Cigarettes     Quit date:      Years since quittin 2    Smokeless tobacco: Never Used   Vaping Use    Vaping Use: Never used   Substance Use Topics    Alcohol use: Not Currently     Comment: quit     Drug use: Never       Review of Systems   Constitutional: Negative for activity change, chills, diaphoresis and fever  HENT: Negative for congestion, sinus pressure and sore throat  Eyes: Negative for pain and visual disturbance  Respiratory: Negative for cough, chest tightness, shortness of breath, wheezing and stridor  Cardiovascular: Negative for chest pain and palpitations  Gastrointestinal: Negative for abdominal distention, abdominal pain, constipation, diarrhea, nausea and vomiting  Genitourinary: Negative for dysuria and frequency  Musculoskeletal: Negative for neck pain and neck stiffness  Skin: Negative for rash  Neurological: Negative for dizziness, speech difficulty, light-headedness, numbness and headaches  Physical Exam  Physical Exam  Vitals reviewed  Constitutional:       General: He is not in acute distress  Appearance: He is well-developed  He is not diaphoretic  HENT:      Head: Normocephalic and atraumatic  Right Ear: External ear normal       Left Ear: External ear normal       Nose: Nose normal    Eyes:      General:         Right eye: No discharge  Left eye: No discharge  Pupils: Pupils are equal, round, and reactive to light  Neck:      Trachea: No tracheal deviation  Cardiovascular:      Rate and Rhythm: Normal rate and regular rhythm        Heart sounds: Normal heart sounds  No murmur heard  Pulmonary:      Effort: Pulmonary effort is normal  No respiratory distress  Breath sounds: Normal breath sounds  No stridor  Abdominal:      General: There is no distension  Palpations: Abdomen is soft  Tenderness: There is no abdominal tenderness  There is no guarding or rebound  Musculoskeletal:         General: Normal range of motion  Cervical back: Normal range of motion and neck supple  Skin:     General: Skin is warm and dry  Coloration: Skin is not pale  Findings: No erythema  Neurological:      General: No focal deficit present  Mental Status: He is alert and oriented to person, place, and time           Vital Signs  ED Triage Vitals   Temperature Pulse Respirations Blood Pressure SpO2   03/27/22 2012 03/27/22 2012 03/27/22 2012 03/27/22 2012 03/27/22 2012   99 6 °F (37 6 °C) 95 18 143/68 96 %      Temp Source Heart Rate Source Patient Position - Orthostatic VS BP Location FiO2 (%)   03/27/22 2012 03/27/22 2012 03/27/22 2012 03/27/22 2012 --   Oral Monitor Sitting Left arm       Pain Score       03/27/22 2149       2           Vitals:    03/27/22 2012 03/27/22 2149   BP: 143/68 130/77   Pulse: 95 88   Patient Position - Orthostatic VS: Sitting Lying         Visual Acuity      ED Medications  Medications   ceFAZolin (ANCEF) IVPB (premix in dextrose) 2,000 mg 50 mL (2,000 mg Intravenous New Bag 3/27/22 2223)   doxycycline hyclate (VIBRAMYCIN) capsule 100 mg (has no administration in time range)   lidocaine-epinephrine (XYLOCAINE/EPINEPHRINE) 1 %-1:100,000 injection 1 mL (1 mL Infiltration Given by Other 3/27/22 2135)       Diagnostic Studies  Results Reviewed     Procedure Component Value Units Date/Time    CBC and differential [[id number]]  (Abnormal) Collected: 03/27/22 2120    Lab Status: Final result Specimen: Blood from Arm, Right Updated: 03/27/22 2216     WBC 6 78 Thousand/uL      RBC 4 82 Million/uL      Hemoglobin 13 9 g/dL Hematocrit 44 2 %      MCV 92 fL      MCH 28 8 pg      MCHC 31 4 g/dL      RDW 16 1 %      MPV 12 6 fL      Platelets 89 Thousands/uL      nRBC 0 /100 WBCs      Neutrophils Relative 79 %      Immat GRANS % 0 %      Lymphocytes Relative 8 %      Monocytes Relative 8 %      Eosinophils Relative 4 %      Basophils Relative 1 %      Neutrophils Absolute 5 34 Thousands/µL      Immature Grans Absolute 0 02 Thousand/uL      Lymphocytes Absolute 0 55 Thousands/µL      Monocytes Absolute 0 55 Thousand/µL      Eosinophils Absolute 0 28 Thousand/µL      Basophils Absolute 0 04 Thousands/µL     Narrative:      No Clots    Comprehensive metabolic panel [[id number]]  (Abnormal) Collected: 03/27/22 2120    Lab Status: Final result Specimen: Blood from Arm, Right Updated: 03/27/22 2147     Sodium 135 mmol/L      Potassium 4 4 mmol/L      Chloride 100 mmol/L      CO2 27 mmol/L      ANION GAP 8 mmol/L      BUN 26 mg/dL      Creatinine 1 28 mg/dL      Glucose 278 mg/dL      Calcium 9 5 mg/dL      Corrected Calcium 10 1 mg/dL      AST 28 U/L      ALT 44 U/L      Alkaline Phosphatase 153 U/L      Total Protein 7 9 g/dL      Albumin 3 2 g/dL      Total Bilirubin 0 89 mg/dL      eGFR 57 ml/min/1 73sq m     Narrative:      [place] guidelines for Chronic Kidney Disease (CKD):     Stage 1 with normal or high GFR (GFR > 90 mL/min/1 73 square meters)    Stage 2 Mild CKD (GFR = 60-89 mL/min/1 73 square meters)    Stage 3A Moderate CKD (GFR = 45-59 mL/min/1 73 square meters)    Stage 3B Moderate CKD (GFR = 30-44 mL/min/1 73 square meters)    Stage 4 Severe CKD (GFR = 15-29 mL/min/1 73 square meters)    Stage 5 End Stage CKD (GFR <15 mL/min/1 73 square meters)  Note: GFR calculation is accurate only with a steady state creatinine                 No orders to display              Procedures  Incision and drain    Date/Time: 3/27/2022 10:09 PM  Performed by: Chris Bautista DO  Authorized by: Chris Bautista DO Patient location:  ED  Location:     Type:  Abscess    Size:  4cm    Location: Right inguinal region  Pre-procedure details:     Skin preparation:  Betadine  Anesthesia (see MAR for exact dosages): Anesthesia method:  Local infiltration    Local anesthetic:  Lidocaine 1% WITH epi  Procedure details:     Incision types:  Single straight    Scalpel blade:  11    Approach:  Open    Wound management:  Probed and deloculated    Irrigation with saline:  100ml    Hemostat:  Used to break up multiple loculations    Drainage:  Purulent    Drainage amount:  Copious    Wound treatment:  Packing placed    Packing materials:  1/2 in iodoform gauze    Amount 1/2" iodoform:  25'  Post-procedure details:     Patient tolerance of procedure: Tolerated well, no immediate complications             ED Course  ED Course as of 03/27/22 2228   Renetta Lopez Mar 27, 2022   2223 Blood work unremarkable, large amount of improvement  , discussed admission patient does not want, which I think is reasonable this time, will return in 2 days for wound check and packing change/removal                                              MDM  Number of Diagnoses or Management Options  Cellulitis of right thigh: new and requires workup  Soft tissue abscess of inguinal region: new and requires workup  Diagnosis management comments:     Abscess right inguinal region, surrounding cellulitis, abscess drained, patient tolerated procedure well, cellulitis to be treated with doxycycline    Strict return parameters  , patient to return in 2-3 days for packing removal/change he understands and agrees the discharge plan       Amount and/or Complexity of Data Reviewed  Clinical lab tests: ordered and reviewed  Decide to obtain previous medical records or to obtain history from someone other than the patient: yes  Obtain history from someone other than the patient: yes  Review and summarize past medical records: yes        Disposition  Final diagnoses:   Soft tissue abscess of inguinal region - Right-sided   Cellulitis of right thigh - Right upper thigh/inguinal region     Time reflects when diagnosis was documented in both MDM as applicable and the Disposition within this note     Time User Action Codes Description Comment    3/27/2022 10:23 PM Antoni Sequin Add [P55 598] Soft tissue abscess of inguinal region     3/27/2022 10:23 PM Antoni Sequin Modify [X90 040] Soft tissue abscess of inguinal region Right-sided    3/27/2022 10:24 PM Antoni Sequin Add [L03 115] Cellulitis of right thigh     3/27/2022 10:24 PM Antoni Sequin Modify [L03 115] Cellulitis of right thigh Right upper thigh/inguinal region      ED Disposition     ED Disposition Condition Date/Time Comment    Discharge Stable Sun Mar 27, 2022 10:23 PM Kaur Parker discharge to home/self care  Follow-up Information    None         Patient's Medications   Discharge Prescriptions    DOXYCYCLINE HYCLATE (VIBRAMYCIN) 100 MG CAPSULE    Take 1 capsule (100 mg total) by mouth 2 (two) times a day for 10 days       Start Date: 3/27/2022 End Date: 4/6/2022       Order Dose: 100 mg       Quantity: 20 capsule    Refills: 0       No discharge procedures on file      PDMP Review       Value Time User    PDMP Reviewed  Yes 12/27/2020 10:39 AM Yoan Meng MD          ED Provider  Electronically Signed by           Marsha Henriquez DO  03/27/22 1212

## 2022-04-11 ENCOUNTER — HOSPITAL ENCOUNTER (EMERGENCY)
Facility: HOSPITAL | Age: 67
Discharge: HOME | End: 2022-04-11
Attending: EMERGENCY MEDICINE | Admitting: EMERGENCY MEDICINE
Payer: MEDICARE

## 2022-04-11 VITALS
TEMPERATURE: 98.2 F | HEART RATE: 100 BPM | OXYGEN SATURATION: 100 % | SYSTOLIC BLOOD PRESSURE: 162 MMHG | RESPIRATION RATE: 18 BRPM | DIASTOLIC BLOOD PRESSURE: 76 MMHG

## 2022-04-11 DIAGNOSIS — L02.214 SOFT TISSUE ABSCESS OF INGUINAL REGION: Primary | ICD-10-CM

## 2022-04-11 PROCEDURE — 99283 EMERGENCY DEPT VISIT LOW MDM: CPT | Performed by: EMERGENCY MEDICINE

## 2022-04-11 PROCEDURE — 10061 I&D ABSCESS COMP/MULTIPLE: CPT | Performed by: EMERGENCY MEDICINE

## 2022-04-11 PROCEDURE — 99282 EMERGENCY DEPT VISIT SF MDM: CPT

## 2022-04-11 RX ORDER — DOXYCYCLINE HYCLATE 100 MG/1
100 CAPSULE ORAL 2 TIMES DAILY
Qty: 10 CAPSULE | Refills: 0 | Status: SHIPPED | OUTPATIENT
Start: 2022-04-11 | End: 2022-04-16

## 2022-04-11 RX ORDER — BUPIVACAINE HYDROCHLORIDE 5 MG/ML
30 INJECTION, SOLUTION EPIDURAL; INTRACAUDAL ONCE
Status: COMPLETED | OUTPATIENT
Start: 2022-04-11 | End: 2022-04-11

## 2022-04-11 RX ADMIN — BUPIVACAINE HYDROCHLORIDE 30 ML: 5 INJECTION, SOLUTION EPIDURAL; INTRACAUDAL at 09:46

## 2022-04-11 NOTE — DISCHARGE INSTRUCTIONS
Diagnosis: right inguinal soft tissue abscess -     - expect bloody drainage  from area for next 7-14 days      - wash area daily with soap and water -- for next week- apply warm soapy rag to area for approximately 30 minutes a day  and let area drain     - inspect area   daily -- please return to  t he er for any spreading redness/warmth/swelling/ pain- an y fevers- temp > 100 4/ any shaking chills  out of control blood sugars-  greater than 500    - for pain- over the counter tylenol 500 mg every 4 hrs     - doxycycline- antibiotic- 1 tablet 2 times a day for the next 5 days---- if go out in sun while taking this medication- please put on  sun block with complete uva/ uvb protection     - after the drainage dionna again can take up to 2 weeks- please cut the lop tie and pull out the loop drain     - the incisions should heal over next month Pt. Is not complying with appointment need. Blood pressure is not controlled. Does not look like she has continued to take the amlodipine.  ( not sure why) NEEDS TO schedule an appointment with an IM provider ASAP.     Sent 15 day refill.   Left message for patient to return call.

## 2022-04-11 NOTE — ED PROCEDURE NOTE
PROCEDURE  Incision and drain    Date/Time: 4/11/2022 11:27 AM  Performed by: Gallo Jara MD  Authorized by: Gallo Jara MD   Universal Protocol:  Procedure performed by:  Consent: Verbal consent obtained  Risks and benefits: risks, benefits and alternatives were discussed  Consent given by: patient  Time out: Immediately prior to procedure a "time out" was called to verify the correct patient, procedure, equipment, support staff and site/side marked as required  Timeout called at: 4/11/2022 11:27 AM   Patient understanding: patient states understanding of the procedure being performed  Required blood products, implants, devices, and special equipment available: none  Patient identity confirmed: verbally with patient      Unsuccessful Attempt: unsuccessful attempt    Patient location:  ED  Location:     Type:  Abscess    Size:  8    Location:  Lower extremity    Lower extremity location: right inguinal area  Pre-procedure details:     Skin preparation:  Antiseptic wash    Skin preparation:  Chlorohexodine wash   Anesthesia (see MAR for exact dosages): Anesthesia method:  Local infiltration    Local anesthetic:  Bupivacaine 0 5% w/o epi (20)  Procedure details:     Complexity:  Complex    Incision type: 2 1 cm linear oblique incisions  Scalpel blade:  15    Approach:  Open    Incision depth:  Submucosal    Wound management:  Probed and deloculated    Irrigation with saline:  None    Hemostat:  Yes     Drainage:  Purulent and bloody    Drainage amount: Moderate    Wound treatment: loop draiange technique      Packing materials:  None  Post-procedure details:     Complication (if applicable):  Mild pain  with hemostat beakign up loculations  Comments:      -- oval shaped laceration 8 cm in diameter-  Loop drainage technique employed with  Penrose drain  threaded thru 2 incisions         Gallo Jara MD  04/11/22 2454

## 2022-04-15 NOTE — ED PROVIDER NOTES
History  Chief Complaint   Patient presents with    Abscess     recently seen in ed for R hip abscess x2 and cellulitis; finished po abx  states one was drained at that time but the second abscess was not and continues to get worse  74 yr  Male  Had 1 r upper thigh abscess drained 1 week ago-  Had 2 smaller ones below one that was drained since then with increasing redness/swelling/warmth/pain - no fever/chills systemic comps- no hx of ca mrsa      History provided by:  Patient   used: No    Abscess  Location:  Leg  Leg abscess location:  R upper leg  Size:  8  Abscess quality: painful, redness and warmth    Red streaking: no        Prior to Admission Medications   Prescriptions Last Dose Informant Patient Reported? Taking? Glyxambi 25-5 MG TABS   Yes No   Patient not taking: Reported on 7/15/2021   Multiple Vitamin (multivitamin) capsule   No No   Sig: Take 1 capsule by mouth daily   benzonatate (TESSALON PERLES) 100 mg capsule   No No   Sig: Take 1 capsule (100 mg total) by mouth 3 (three) times a day as needed for cough   cholecalciferol (VITAMIN D3) 1,000 units tablet   No No   Sig: Take 2 tablets (2,000 Units total) by mouth daily   diltiazem (CARDIZEM CD) 180 mg 24 hr capsule   Yes No   ferrous sulfate 325 (65 Fe) mg tablet   No No   Sig: Take 1 tablet (325 mg total) by mouth daily with breakfast   fluticasone-salmeterol (ADVAIR HFA) 115-21 MCG/ACT inhaler   Yes No   Sig: Inhale 2 puffs 2 (two) times a day Rinse mouth after use     Patient not taking: Reported on 9598   folic acid (FOLVITE) 1 mg tablet   No No   Sig: Take 1 tablet (1 mg total) by mouth daily   glimepiride (AMARYL) 4 mg tablet   Yes No   Si (two) times a day    guaiFENesin (ROBITUSSIN) 100 MG/5ML oral liquid   No No   Sig: Take 10 mL (200 mg total) by mouth 3 (three) times a day as needed for cough   Patient not taking: Reported on 7/15/2021   loratadine (CLARITIN) 10 mg tablet   No No   Sig: Take 1 tablet (10 mg total) by mouth daily   losartan (COZAAR) 50 mg tablet   Yes No   pantoprazole (PROTONIX) 40 mg tablet   No No   Sig: Take 1 tablet (40 mg total) by mouth daily On an empty stomatch, at least 30 mins before breakfast   promethazine (PHENERGAN) 12 5 mg/10 mL syrup   Yes No   Sig: Take by mouth 4 (four) times a day as needed for nausea or vomiting   Patient not taking: Reported on 7/15/2021   thiamine 100 MG tablet   No No   Sig: Take 1 tablet (100 mg total) by mouth daily   triamterene-hydrochlorothiazide (MAXZIDE-25) 37 5-25 mg per tablet   Yes No   Sig: Take 1 tablet by mouth daily      Facility-Administered Medications: None       Past Medical History:   Diagnosis Date    Arrhythmia     [facility]     History of echocardiogram 11/14/2017    showed EF of 50-55 percentWith moderate LVH and left ventricle diastolic dysfunction  Left atrium was moderately enlarged  Trace MR noted   History of Holter monitoring 11/21/2017    showed baseline rhythm of sinus origin with an average heart it of 61 bpm  The lowest heart rate was 49 and the highest heart rate was 10 8 bpm  There were rare single VPCs, and frequent PACs representing 3 2% of total beats  There were several episodes of sinus arrhythmias with sinus bradycardia and heart rate ranging from 40-90 bpm  No sustained dysrhythmias, or pauses noted  The patient did not    Hypertension     Obese        Past Surgical History:   Procedure Laterality Date    EYE SURGERY Left 1997    HERNIA REPAIR  [id number]    KNEE ARTHROPLASTY Right 2008    SHOULDER SURGERY Right 2002       Family History   Problem Relation Age of Onset    Diabetes Mother     Supraventricular tachycardia Mother     COPD Father     Heart disease Father     Other Father         Sepsis; related to UTI with complicating cardiac problems    Heart disease Paternal Grandmother      I have reviewed and agree with the history as documented      E-Cigarette/Vaping    E-Cigarette Use Never User      E-Cigarette/Vaping Substances     Social History     Tobacco Use    Smoking status: Former Smoker     Packs/day: 0 50     Years: 20 00     Pack years: 10 00     Types: Cigarettes     Quit date:      Years since quittin 3    Smokeless tobacco: Never Used   Vaping Use    Vaping Use: Never used   Substance Use Topics    Alcohol use: Not Currently     Comment: quit     Drug use: Never       Review of Systems   Constitutional: Negative  HENT: Negative  Eyes: Negative  Respiratory: Negative  Cardiovascular: Negative  Gastrointestinal: Negative  Endocrine: Negative  Genitourinary: Negative  Musculoskeletal: Negative  Skin: Negative for color change, pallor, rash and wound  r upper thigh abscess   Allergic/Immunologic: Negative  Neurological: Negative  Hematological: Negative  Psychiatric/Behavioral: Negative  Physical Exam  Physical Exam  Vitals and nursing note reviewed  Constitutional:       General: He is not in acute distress  Appearance: Normal appearance  He is not ill-appearing, toxic-appearing or diaphoretic  Comments: avss- htnsive- pulse ox 100 % on ra- interpretation is normal- no intervention    Musculoskeletal:         General: Swelling and tenderness present  No deformity or signs of injury  Normal range of motion  Right lower leg: No edema  Left lower leg: No edema  Comments: rle- upper anterior thigh with 8 cm oval area of overlying erythema/warmth/ tenderness  With 2 separate areas coming to a head but no drainage- no crepitus/nectosis- normal gu /perineal exam    Skin:     General: Skin is warm  Capillary Refill: Capillary refill takes less than 2 seconds  Coloration: Skin is not jaundiced or pale  Findings: Erythema present  No bruising, lesion or rash  Neurological:      Mental Status: He is alert           Vital Signs  ED Triage Vitals [22 0818]   Temperature Pulse Respirations Blood Pressure SpO2   98 2 °F (36 8 °C) 100 18 162/76 100 %      Temp src Heart Rate Source Patient Position - Orthostatic VS BP Location FiO2 (%)   -- Monitor Sitting Right arm --      Pain Score       --           Vitals:    04/11/22 0818   BP: 162/76   Pulse: 100   Patient Position - Orthostatic VS: Sitting         Visual Acuity      ED Medications  Medications   bupivacaine (PF) (MARCAINE) 0 5 % injection 30 mL (30 mL Infiltration Given by Other 4/11/22 0946)       Diagnostic Studies  Results Reviewed     None                 No orders to display              Procedures  Procedures         ED Course                               SBIRT 20yo+      Most Recent Value   SBIRT (22 yo +)    In order to provide better care to our patients, we are screening all of our patients for alcohol and drug use  Would it be okay to ask you these screening questions? Unable to answer at this time Filed at: 04/11/2022 0945                    MDM    Disposition  Final diagnoses:   Soft tissue abscess of inguinal region     Time reflects when diagnosis was documented in both MDM as applicable and the Disposition within this note     Time User Action Codes Description Comment    4/11/2022 11:32 AM Arvind Riley Add [E60 [id number]] Soft tissue abscess of inguinal region       ED Disposition     ED Disposition Condition Date/Time Comment    Discharge Stable Mon Apr 11, 2022 1132 Willard Pierson discharge to home/self care              Follow-up Information    None         Discharge Medication List as of 4/11/2022 11:38 AM      START taking these medications    Details   doxycycline hyclate (VIBRAMYCIN) 100 mg capsule Take 1 capsule (100 mg total) by mouth 2 (two) times a day for 10 doses, Starting Mon 4/11/2022, Until Sat 4/16/2022, Print         CONTINUE these medications which have NOT CHANGED    Details   benzonatate (TESSALON PERLES) 100 mg capsule Take 1 capsule (100 mg total) by mouth 3 (three) times a day as needed for cough, Starting Fri 3/25/2022, Normal      cholecalciferol (VITAMIN D3) 1,000 units tablet Take 2 tablets (2,000 Units total) by mouth daily, Starting Mon 12/28/2020, Until Wed 1/27/2021, Normal      diltiazem (CARDIZEM CD) 180 mg 24 hr capsule Starting Thu 10/22/2020, Historical Med      ferrous sulfate 325 (65 Fe) mg tablet Take 1 tablet (325 mg total) by mouth daily with breakfast, Starting Mon 12/28/2020, Until Wed 1/27/2021, Normal      fluticasone-salmeterol (ADVAIR HFA) 115-21 MCG/ACT inhaler Inhale 2 puffs 2 (two) times a day Rinse mouth after use , Historical Med      folic acid (FOLVITE) 1 mg tablet Take 1 tablet (1 mg total) by mouth daily, Starting Mon 12/28/2020, Until Wed 1/27/2021, Normal      glimepiride (AMARYL) 4 mg tablet 2 (two) times a day , Starting Thu 10/22/2020, Historical Med      Glyxambi 25-5 MG TABS Starting Thu 10/22/2020, Historical Med      guaiFENesin (ROBITUSSIN) 100 MG/5ML oral liquid Take 10 mL (200 mg total) by mouth 3 (three) times a day as needed for cough, Starting Sun 1/3/2021, Normal      loratadine (CLARITIN) 10 mg tablet Take 1 tablet (10 mg total) by mouth daily, Starting Mon 12/28/2020, Until Wed 1/27/2021, Normal      losartan (COZAAR) 50 mg tablet Starting Fri 10/23/2020, Historical Med      Multiple Vitamin (multivitamin) capsule Take 1 capsule by mouth daily, Starting Sun 1/3/2021, Normal      pantoprazole (PROTONIX) 40 mg tablet Take 1 tablet (40 mg total) by mouth daily On an empty stomatch, at least 30 mins before breakfast, Starting Sun 12/27/2020, Until Tue 1/26/2021, Normal      promethazine (PHENERGAN) 12 5 mg/10 mL syrup Take by mouth 4 (four) times a day as needed for nausea or vomiting, Historical Med      thiamine 100 MG tablet Take 1 tablet (100 mg total) by mouth daily, Starting Mon 12/28/2020, Until Wed 1/27/2021, Normal      triamterene-hydrochlorothiazide (MAXZIDE-25) 37 5-25 mg per tablet Take 1 tablet by mouth daily, Historical Med             No discharge procedures on file      PDMP Review       Value Time User    PDMP Reviewed  Yes 12/27/2020 10:39 AM Ezio Camp MD          ED Provider  Electronically Signed by           Merari Alcaraz MD  04/14/22 2053

## 2022-05-17 ENCOUNTER — HOSPITAL ENCOUNTER (INPATIENT)
Facility: HOSPITAL | Age: 67
LOS: 2 days | Discharge: HOME | DRG: 623 | End: 2022-05-19
Attending: EMERGENCY MEDICINE | Admitting: INTERNAL MEDICINE
Payer: MEDICARE

## 2022-05-17 ENCOUNTER — APPOINTMENT (EMERGENCY)
Dept: RADIOLOGY | Facility: HOSPITAL | Age: 67
DRG: 623 | End: 2022-05-17
Payer: MEDICARE

## 2022-05-17 DIAGNOSIS — E11.9 TYPE 2 DIABETES MELLITUS WITHOUT COMPLICATION, WITHOUT LONG-TERM CURRENT USE OF INSULIN (HCC): ICD-10-CM

## 2022-05-17 DIAGNOSIS — E11.628 DIABETIC FOOT INFECTION (HCC): Primary | ICD-10-CM

## 2022-05-17 DIAGNOSIS — L08.9 DIABETIC FOOT INFECTION (HCC): Primary | ICD-10-CM

## 2022-05-17 PROBLEM — L97.529 DIABETIC ULCER OF LEFT FOOT ASSOCIATED WITH TYPE 2 DIABETES MELLITUS (HCC): Status: ACTIVE | Noted: 2022-05-17

## 2022-05-17 PROBLEM — E11.621 DIABETIC ULCER OF LEFT FOOT ASSOCIATED WITH TYPE 2 DIABETES MELLITUS (HCC): Status: ACTIVE | Noted: 2022-05-17

## 2022-05-17 PROBLEM — D72.819 LEUKOPENIA: Status: ACTIVE | Noted: 2022-05-17

## 2022-05-17 LAB
ANION GAP SERPL CALCULATED.3IONS-SCNC: 12 MMOL/L (ref 4–13)
BASOPHILS # BLD AUTO: 0.03 THOUSANDS/ΜL (ref 0–0.1)
BASOPHILS NFR BLD AUTO: 1 % (ref 0–1)
BUN SERPL-MCNC: 26 MG/DL (ref 5–25)
CALCIUM SERPL-MCNC: 9.6 MG/DL (ref 8.4–10.2)
CHLORIDE SERPL-SCNC: 101 MMOL/L (ref 96–108)
CO2 SERPL-SCNC: 24 MMOL/L (ref 21–32)
CREAT SERPL-MCNC: 0.97 MG/DL (ref 0.6–1.3)
EOSINOPHIL # BLD AUTO: 0.11 THOUSAND/ΜL (ref 0–0.61)
EOSINOPHIL NFR BLD AUTO: 3 % (ref 0–6)
ERYTHROCYTE [DISTWIDTH] IN BLOOD BY AUTOMATED COUNT: 14.6 % (ref 11.6–15.1)
GFR SERPL CREATININE-BSD FRML MDRD: 79 ML/MIN/1.73SQ M
GLUCOSE SERPL-MCNC: 243 MG/DL (ref 65–140)
GLUCOSE SERPL-MCNC: 258 MG/DL (ref 65–140)
HCT VFR BLD AUTO: 40.5 % (ref 36.5–49.3)
HGB BLD-MCNC: 12.7 G/DL (ref 12–17)
IMM GRANULOCYTES # BLD AUTO: 0.02 THOUSAND/UL (ref 0–0.2)
IMM GRANULOCYTES NFR BLD AUTO: 1 % (ref 0–2)
LACTATE SERPL-SCNC: 1.4 MMOL/L (ref 0.5–2)
LYMPHOCYTES # BLD AUTO: 0.52 THOUSANDS/ΜL (ref 0.6–4.47)
LYMPHOCYTES NFR BLD AUTO: 15 % (ref 14–44)
MCH RBC QN AUTO: 28.4 PG (ref 26.8–34.3)
MCHC RBC AUTO-ENTMCNC: 31.4 G/DL (ref 31.4–37.4)
MCV RBC AUTO: 91 FL (ref 82–98)
MONOCYTES # BLD AUTO: 0.31 THOUSAND/ΜL (ref 0.17–1.22)
MONOCYTES NFR BLD AUTO: 9 % (ref 4–12)
NEUTROPHILS # BLD AUTO: 2.45 THOUSANDS/ΜL (ref 1.85–7.62)
NEUTS SEG NFR BLD AUTO: 71 % (ref 43–75)
NRBC BLD AUTO-RTO: 0 /100 WBCS
PLATELET # BLD AUTO: 66 THOUSANDS/UL (ref 149–390)
PMV BLD AUTO: 13.3 FL (ref 8.9–12.7)
POTASSIUM SERPL-SCNC: 4.3 MMOL/L (ref 3.5–5.3)
RBC # BLD AUTO: 4.47 MILLION/UL (ref 3.88–5.62)
SODIUM SERPL-SCNC: 137 MMOL/L (ref 135–147)
WBC # BLD AUTO: 3.44 THOUSAND/UL (ref 4.31–10.16)

## 2022-05-17 PROCEDURE — 96367 TX/PROPH/DG ADDL SEQ IV INF: CPT

## 2022-05-17 PROCEDURE — 83036 HEMOGLOBIN GLYCOSYLATED A1C: CPT

## 2022-05-17 PROCEDURE — 94760 N-INVAS EAR/PLS OXIMETRY 1: CPT

## 2022-05-17 PROCEDURE — 96366 THER/PROPH/DIAG IV INF ADDON: CPT

## 2022-05-17 PROCEDURE — 85025 COMPLETE CBC W/AUTO DIFF WBC: CPT | Performed by: EMERGENCY MEDICINE

## 2022-05-17 PROCEDURE — 83605 ASSAY OF LACTIC ACID: CPT

## 2022-05-17 PROCEDURE — 1124F ACP DISCUSS-NO DSCNMKR DOCD: CPT | Performed by: EMERGENCY MEDICINE

## 2022-05-17 PROCEDURE — 99223 1ST HOSP IP/OBS HIGH 75: CPT | Performed by: INTERNAL MEDICINE

## 2022-05-17 PROCEDURE — 73630 X-RAY EXAM OF FOOT: CPT

## 2022-05-17 PROCEDURE — 94002 VENT MGMT INPAT INIT DAY: CPT

## 2022-05-17 PROCEDURE — 80048 BASIC METABOLIC PNL TOTAL CA: CPT | Performed by: EMERGENCY MEDICINE

## 2022-05-17 PROCEDURE — 36415 COLL VENOUS BLD VENIPUNCTURE: CPT | Performed by: EMERGENCY MEDICINE

## 2022-05-17 PROCEDURE — 96365 THER/PROPH/DIAG IV INF INIT: CPT

## 2022-05-17 PROCEDURE — 99222 1ST HOSP IP/OBS MODERATE 55: CPT | Performed by: PODIATRIST

## 2022-05-17 PROCEDURE — 99285 EMERGENCY DEPT VISIT HI MDM: CPT | Performed by: EMERGENCY MEDICINE

## 2022-05-17 PROCEDURE — 82948 REAGENT STRIP/BLOOD GLUCOSE: CPT

## 2022-05-17 PROCEDURE — 11042 DBRDMT SUBQ TIS 1ST 20SQCM/<: CPT | Performed by: PODIATRIST

## 2022-05-17 PROCEDURE — 99284 EMERGENCY DEPT VISIT MOD MDM: CPT

## 2022-05-17 PROCEDURE — 87040 BLOOD CULTURE FOR BACTERIA: CPT | Performed by: EMERGENCY MEDICINE

## 2022-05-17 PROCEDURE — 0JBR0ZZ EXCISION OF LEFT FOOT SUBCUTANEOUS TISSUE AND FASCIA, OPEN APPROACH: ICD-10-PCS | Performed by: PODIATRIST

## 2022-05-17 RX ORDER — FOLIC ACID 1 MG/1
1 TABLET ORAL DAILY
Status: DISCONTINUED | OUTPATIENT
Start: 2022-05-18 | End: 2022-05-19 | Stop reason: HOSPADM

## 2022-05-17 RX ORDER — INSULIN GLARGINE 100 [IU]/ML
20 INJECTION, SOLUTION SUBCUTANEOUS
Status: DISCONTINUED | OUTPATIENT
Start: 2022-05-17 | End: 2022-05-18

## 2022-05-17 RX ORDER — TRIAMTERENE AND HYDROCHLOROTHIAZIDE 37.5; 25 MG/1; MG/1
1 TABLET ORAL DAILY
Status: DISCONTINUED | OUTPATIENT
Start: 2022-05-18 | End: 2022-05-19 | Stop reason: HOSPADM

## 2022-05-17 RX ORDER — INSULIN LISPRO 100 [IU]/ML
13 INJECTION, SOLUTION INTRAVENOUS; SUBCUTANEOUS
Status: DISCONTINUED | OUTPATIENT
Start: 2022-05-18 | End: 2022-05-18

## 2022-05-17 RX ORDER — ENOXAPARIN SODIUM 100 MG/ML
40 INJECTION SUBCUTANEOUS DAILY
Status: DISCONTINUED | OUTPATIENT
Start: 2022-05-18 | End: 2022-05-19 | Stop reason: HOSPADM

## 2022-05-17 RX ORDER — VANCOMYCIN HYDROCHLORIDE 1 G/200ML
1000 INJECTION, SOLUTION INTRAVENOUS EVERY 12 HOURS
Status: DISCONTINUED | OUTPATIENT
Start: 2022-05-17 | End: 2022-05-17

## 2022-05-17 RX ORDER — LOSARTAN POTASSIUM 50 MG/1
50 TABLET ORAL DAILY
Status: DISCONTINUED | OUTPATIENT
Start: 2022-05-18 | End: 2022-05-19 | Stop reason: HOSPADM

## 2022-05-17 RX ORDER — INSULIN LISPRO 100 [IU]/ML
1-6 INJECTION, SOLUTION INTRAVENOUS; SUBCUTANEOUS
Status: DISCONTINUED | OUTPATIENT
Start: 2022-05-18 | End: 2022-05-19 | Stop reason: HOSPADM

## 2022-05-17 RX ORDER — DILTIAZEM HYDROCHLORIDE 180 MG/1
180 CAPSULE, COATED, EXTENDED RELEASE ORAL DAILY
Status: DISCONTINUED | OUTPATIENT
Start: 2022-05-18 | End: 2022-05-19 | Stop reason: HOSPADM

## 2022-05-17 RX ORDER — LANOLIN ALCOHOL/MO/W.PET/CERES
100 CREAM (GRAM) TOPICAL DAILY
Status: DISCONTINUED | OUTPATIENT
Start: 2022-05-18 | End: 2022-05-19 | Stop reason: HOSPADM

## 2022-05-17 RX ADMIN — INSULIN GLARGINE 20 UNITS: 100 INJECTION, SOLUTION SUBCUTANEOUS at 23:27

## 2022-05-17 RX ADMIN — Medication 2000 MG: at 15:32

## 2022-05-17 RX ADMIN — VANCOMYCIN HYDROCHLORIDE 2000 MG: 1 INJECTION, POWDER, LYOPHILIZED, FOR SOLUTION INTRAVENOUS at 15:56

## 2022-05-17 NOTE — ED PROVIDER NOTES
History  Chief Complaint   Patient presents with    Foot Ulcer     Pt reports L foot wound for a few days, started out as blister, but now oozing and turning black, hx diabetes and neuropathy, denies fevers        History provided by:  Patient   used: No        Patient is a 56-year-old male presenting to emergency department with wound of the left foot  Draining dark fluid earlier today  Foul-smelling  Patient is diabetic  Has neuropathy  Does not feel the area well  No fevers  MDM diabetic foot infection, will get CBC, BMP, blood culture, x-ray, antibiotics broad spectrum    Prior to Admission Medications   Prescriptions Last Dose Informant Patient Reported? Taking? Glyxambi 25-5 MG TABS   Yes No   Patient not taking: Reported on 7/15/2021   Multiple Vitamin (multivitamin) capsule   No No   Sig: Take 1 capsule by mouth daily   benzonatate (TESSALON PERLES) 100 mg capsule   No No   Sig: Take 1 capsule (100 mg total) by mouth 3 (three) times a day as needed for cough   cholecalciferol (VITAMIN D3) 1,000 units tablet   No No   Sig: Take 2 tablets (2,000 Units total) by mouth daily   diltiazem (CARDIZEM CD) 180 mg 24 hr capsule   Yes No   ferrous sulfate 325 (65 Fe) mg tablet   No No   Sig: Take 1 tablet (325 mg total) by mouth daily with breakfast   fluticasone-salmeterol (ADVAIR HFA) 115-21 MCG/ACT inhaler   Yes No   Sig: Inhale 2 puffs 2 (two) times a day Rinse mouth after use     Patient not taking: Reported on 3794   folic acid (FOLVITE) 1 mg tablet   No No   Sig: Take 1 tablet (1 mg total) by mouth daily   glimepiride (AMARYL) 4 mg tablet   Yes No   Si (two) times a day    guaiFENesin (ROBITUSSIN) 100 MG/5ML oral liquid   No No   Sig: Take 10 mL (200 mg total) by mouth 3 (three) times a day as needed for cough   Patient not taking: Reported on 7/15/2021   loratadine (CLARITIN) 10 mg tablet   No No   Sig: Take 1 tablet (10 mg total) by mouth daily   losartan (COZAAR) 50 mg tablet   Yes No   pantoprazole (PROTONIX) 40 mg tablet   No No   Sig: Take 1 tablet (40 mg total) by mouth daily On an empty stomatch, at least 30 mins before breakfast   promethazine (PHENERGAN) 12 5 mg/10 mL syrup   Yes No   Sig: Take by mouth 4 (four) times a day as needed for nausea or vomiting   Patient not taking: Reported on 7/15/2021   thiamine 100 MG tablet   No No   Sig: Take 1 tablet (100 mg total) by mouth daily   triamterene-hydrochlorothiazide (MAXZIDE-25) 37 5-25 mg per tablet   Yes No   Sig: Take 1 tablet by mouth daily      Facility-Administered Medications: None       Past Medical History:   Diagnosis Date    Arrhythmia     [facility] ([facility]     History of echocardiogram 11/14/2017    showed EF of 50-55 percentWith moderate LVH and left ventricle diastolic dysfunction  Left atrium was moderately enlarged  Trace MR noted   History of Holter monitoring 11/21/2017    showed baseline rhythm of sinus origin with an average heart it of 61 bpm  The lowest heart rate was 49 and the highest heart rate was 10 8 bpm  There were rare single VPCs, and frequent PACs representing 3 2% of total beats  There were several episodes of sinus arrhythmias with sinus bradycardia and heart rate ranging from 40-90 bpm  No sustained dysrhythmias, or pauses noted  The patient did not    Hypertension     Obese        Past Surgical History:   Procedure Laterality Date    EYE SURGERY Left 1997    HERNIA REPAIR  [id number]    KNEE ARTHROPLASTY Right 2008    SHOULDER SURGERY Right 2002       Family History   Problem Relation Age of Onset    Diabetes Mother     Supraventricular tachycardia Mother     COPD Father     Heart disease Father     Other Father         Sepsis; related to UTI with complicating cardiac problems    Heart disease Paternal Grandmother      I have reviewed and agree with the history as documented      E-Cigarette/Vaping    E-Cigarette Use Never User      E-Cigarette/Vaping Substances Social History     Tobacco Use    Smoking status: Former Smoker     Packs/day: 0 50     Years: 20 00     Pack years: 10 00     Types: Cigarettes     Quit date:      Years since quittin 3    Smokeless tobacco: Never Used   Vaping Use    Vaping Use: Never used   Substance Use Topics    Alcohol use: Not Currently     Comment: quit     Drug use: Never       Review of Systems   Constitutional: Negative for chills, diaphoresis and fever  HENT: Negative for congestion and sore throat  Respiratory: Negative for cough, shortness of breath, wheezing and stridor  Cardiovascular: Negative for chest pain, palpitations and leg swelling  Gastrointestinal: Negative for abdominal pain, blood in stool, diarrhea, nausea and vomiting  Genitourinary: Negative for dysuria, frequency and urgency  Musculoskeletal: Negative for neck pain and neck stiffness  Skin: Positive for wound  Negative for pallor and rash  Neurological: Negative for dizziness, syncope, weakness, light-headedness and headaches  All other systems reviewed and are negative  Physical Exam  Physical Exam  Vitals reviewed  Constitutional:       Appearance: Normal appearance  He is well-developed  HENT:      Head: Normocephalic and atraumatic  Eyes:      Extraocular Movements: Extraocular movements intact  Pupils: Pupils are equal, round, and reactive to light  Cardiovascular:      Rate and Rhythm: Normal rate and regular rhythm  Heart sounds: Normal heart sounds  Pulmonary:      Effort: Pulmonary effort is normal  No respiratory distress  Breath sounds: Normal breath sounds  Musculoskeletal:         General: Swelling and tenderness present  Normal range of motion  Cervical back: Normal range of motion and neck supple  Comments: Tenderness to the bottom of the left foot  Black eschar noted  Skin:     General: Skin is warm and dry        Capillary Refill: Capillary refill takes less than 2 seconds  Neurological:      General: No focal deficit present  Mental Status: He is alert and oriented to person, place, and time           Vital Signs  ED Triage Vitals [05/17/22 1357]   Temperature Pulse Respirations Blood Pressure SpO2   98 1 °F (36 7 °C) 87 16 140/67 97 %      Temp Source Heart Rate Source Patient Position - Orthostatic VS BP Location FiO2 (%)   Oral Monitor Sitting Left arm --      Pain Score       No Pain           Vitals:    05/17/22 1357   BP: 140/67   Pulse: 87   Patient Position - Orthostatic VS: Sitting         Visual Acuity      ED Medications  Medications   cefepime (MAXIPIME) 2 g/50 mL dextrose IVPB (0 mg Intravenous Stopped 5/17/22 1551)   vancomycin (VANCOCIN) 2,000 mg in sodium chloride 0 9 % 500 mL IVPB (2,000 mg Intravenous New Bag 5/17/22 1556)       Diagnostic Studies  Results Reviewed     Procedure Component Value Units Date/Time    Basic metabolic panel [[id number]]  (Abnormal) Collected: 05/17/22 1527    Lab Status: Final result Specimen: Blood from Arm, Left Updated: 05/17/22 1757     Sodium 137 mmol/L      Potassium 4 3 mmol/L      Chloride 101 mmol/L      CO2 24 mmol/L      ANION GAP 12 mmol/L      BUN 26 mg/dL      Creatinine 0 97 mg/dL      Glucose 243 mg/dL      Calcium 9 6 mg/dL      eGFR 79 ml/min/1 73sq m     Narrative:      [place] guidelines for Chronic Kidney Disease (CKD):     Stage 1 with normal or high GFR (GFR > 90 mL/min/1 73 square meters)    Stage 2 Mild CKD (GFR = 60-89 mL/min/1 73 square meters)    Stage 3A Moderate CKD (GFR = 45-59 mL/min/1 73 square meters)    Stage 3B Moderate CKD (GFR = 30-44 mL/min/1 73 square meters)    Stage 4 Severe CKD (GFR = 15-29 mL/min/1 73 square meters)    Stage 5 End Stage CKD (GFR <15 mL/min/1 73 square meters)  Note: GFR calculation is accurate only with a steady state creatinine    CBC and differential [[id number]]  (Abnormal) Collected: 05/17/22 1527    Lab Status: Final result Specimen: Blood from Arm, Left Updated: 05/17/22 1633     WBC 3 44 Thousand/uL      RBC 4 47 Million/uL      Hemoglobin 12 7 g/dL      Hematocrit 40 5 %      MCV 91 fL      MCH 28 4 pg      MCHC 31 4 g/dL      RDW 14 6 %      MPV 13 3 fL      Platelets 66 Thousands/uL      nRBC 0 /100 WBCs      Neutrophils Relative 71 %      Immat GRANS % 1 %      Lymphocytes Relative 15 %      Monocytes Relative 9 %      Eosinophils Relative 3 %      Basophils Relative 1 %      Neutrophils Absolute 2 45 Thousands/µL      Immature Grans Absolute 0 02 Thousand/uL      Lymphocytes Absolute 0 52 Thousands/µL      Monocytes Absolute 0 31 Thousand/µL      Eosinophils Absolute 0 11 Thousand/µL      Basophils Absolute 0 03 Thousands/µL     Narrative: This is an appended report  These results have been appended to a previously verified report  Blood culture #1 [[id number]] Collected: 05/17/22 1508    Lab Status: In process Specimen: Blood from Arm, Right Updated: 05/17/22 1526    Blood culture #2 [[id number]] Collected: 05/17/22 1508    Lab Status: In process Specimen: Blood from Arm, Left Updated: 05/17/22 1525                 XR foot 3+ views LEFT   Final Result by Marlo Aguilar MD (05/17 1507)      No acute osseous abnormality nor osteomyelitis              Workstation performed: [id number]                    Procedures  Procedures         ED Course                                             MDM    Disposition  Final diagnoses:   Diabetic foot infection ([facility] )     Time reflects when diagnosis was documented in both MDM as applicable and the Disposition within this note     Time User Action Codes Description Comment    5/17/2022  6:07 PM Keily Mello Add [O13 [id number],  L97 509] Diabetic foot ulcers ([facility] )     5/17/2022  6:07 PM Keily Mello Remove [F74 [id number],  L97 509] Diabetic foot ulcers ([facility] )     5/17/2022  6:08 PM Keily Mello Add [G68 [id number],  L08 9] Diabetic foot infection [facility])       ED Disposition     ED Disposition Admit    Condition   Stable    Date/Time   Tue May 17, 2022  6:08 PM    Comment   Case was discussed with Dr Elisabeth Resendiz and the patient's admission status was agreed to be Admission Status: inpatient status to the service of Dr Pina    Follow-up Information    None         Patient's Medications   Discharge Prescriptions    No medications on file       No discharge procedures on file      PDMP Review       Value Time User    PDMP Reviewed  Yes 12/27/2020 10:39 AM Jadon Romero MD          ED Provider  Electronically Signed by           Sheila Vallecillo MD  05/17/22 Jean Paul Vasquez

## 2022-05-18 LAB
ALBUMIN SERPL BCP-MCNC: 3.5 G/DL (ref 3.5–5)
ALP SERPL-CCNC: 144 U/L (ref 34–104)
ALT SERPL W P-5'-P-CCNC: 48 U/L (ref 7–52)
ANION GAP SERPL CALCULATED.3IONS-SCNC: 7 MMOL/L (ref 4–13)
AST SERPL W P-5'-P-CCNC: 44 U/L (ref 13–39)
BASOPHILS # BLD AUTO: 0.03 THOUSANDS/ΜL (ref 0–0.1)
BASOPHILS NFR BLD AUTO: 1 % (ref 0–1)
BILIRUB SERPL-MCNC: 1.29 MG/DL (ref 0.2–1)
BUN SERPL-MCNC: 22 MG/DL (ref 5–25)
CALCIUM SERPL-MCNC: 9.2 MG/DL (ref 8.4–10.2)
CHLORIDE SERPL-SCNC: 103 MMOL/L (ref 96–108)
CO2 SERPL-SCNC: 26 MMOL/L (ref 21–32)
CREAT SERPL-MCNC: 0.91 MG/DL (ref 0.6–1.3)
EOSINOPHIL # BLD AUTO: 0.14 THOUSAND/ΜL (ref 0–0.61)
EOSINOPHIL NFR BLD AUTO: 5 % (ref 0–6)
ERYTHROCYTE [DISTWIDTH] IN BLOOD BY AUTOMATED COUNT: 14.7 % (ref 11.6–15.1)
EST. AVERAGE GLUCOSE BLD GHB EST-MCNC: 214 MG/DL
GFR SERPL CREATININE-BSD FRML MDRD: 86 ML/MIN/1.73SQ M
GLUCOSE SERPL-MCNC: 161 MG/DL (ref 65–140)
GLUCOSE SERPL-MCNC: 173 MG/DL (ref 65–140)
GLUCOSE SERPL-MCNC: 180 MG/DL (ref 65–140)
GLUCOSE SERPL-MCNC: 184 MG/DL (ref 65–140)
GLUCOSE SERPL-MCNC: 205 MG/DL (ref 65–140)
HBA1C MFR BLD: 9.1 %
HCT VFR BLD AUTO: 40.1 % (ref 36.5–49.3)
HGB BLD-MCNC: 12.4 G/DL (ref 12–17)
IMM GRANULOCYTES # BLD AUTO: 0.01 THOUSAND/UL (ref 0–0.2)
IMM GRANULOCYTES NFR BLD AUTO: 0 % (ref 0–2)
LYMPHOCYTES # BLD AUTO: 0.49 THOUSANDS/ΜL (ref 0.6–4.47)
LYMPHOCYTES NFR BLD AUTO: 16 % (ref 14–44)
MCH RBC QN AUTO: 28.1 PG (ref 26.8–34.3)
MCHC RBC AUTO-ENTMCNC: 30.9 G/DL (ref 31.4–37.4)
MCV RBC AUTO: 91 FL (ref 82–98)
MONOCYTES # BLD AUTO: 0.3 THOUSAND/ΜL (ref 0.17–1.22)
MONOCYTES NFR BLD AUTO: 10 % (ref 4–12)
NEUTROPHILS # BLD AUTO: 2.11 THOUSANDS/ΜL (ref 1.85–7.62)
NEUTS SEG NFR BLD AUTO: 68 % (ref 43–75)
NRBC BLD AUTO-RTO: 0 /100 WBCS
PLATELET # BLD AUTO: 70 THOUSANDS/UL (ref 149–390)
PMV BLD AUTO: 12.9 FL (ref 8.9–12.7)
POTASSIUM SERPL-SCNC: 4 MMOL/L (ref 3.5–5.3)
PROT SERPL-MCNC: 6.7 G/DL (ref 6.4–8.4)
RBC # BLD AUTO: 4.41 MILLION/UL (ref 3.88–5.62)
SODIUM SERPL-SCNC: 136 MMOL/L (ref 135–147)
WBC # BLD AUTO: 3.08 THOUSAND/UL (ref 4.31–10.16)

## 2022-05-18 PROCEDURE — 82948 REAGENT STRIP/BLOOD GLUCOSE: CPT

## 2022-05-18 PROCEDURE — 85025 COMPLETE CBC W/AUTO DIFF WBC: CPT

## 2022-05-18 PROCEDURE — RECHECK: Performed by: INTERNAL MEDICINE

## 2022-05-18 PROCEDURE — 80053 COMPREHEN METABOLIC PANEL: CPT

## 2022-05-18 PROCEDURE — 94660 CPAP INITIATION&MGMT: CPT

## 2022-05-18 PROCEDURE — 94760 N-INVAS EAR/PLS OXIMETRY 1: CPT

## 2022-05-18 PROCEDURE — 99232 SBSQ HOSP IP/OBS MODERATE 35: CPT | Performed by: INTERNAL MEDICINE

## 2022-05-18 RX ORDER — OFLOXACIN 3 MG/ML
1 SOLUTION/ DROPS OPHTHALMIC 4 TIMES DAILY
Status: DISCONTINUED | OUTPATIENT
Start: 2022-05-18 | End: 2022-05-19 | Stop reason: HOSPADM

## 2022-05-18 RX ORDER — PREDNISOLONE ACETATE 10 MG/ML
1 SUSPENSION/ DROPS OPHTHALMIC 3 TIMES DAILY
Status: DISCONTINUED | OUTPATIENT
Start: 2022-05-18 | End: 2022-05-19 | Stop reason: HOSPADM

## 2022-05-18 RX ORDER — INSULIN GLARGINE 100 [IU]/ML
30 INJECTION, SOLUTION SUBCUTANEOUS
Status: DISCONTINUED | OUTPATIENT
Start: 2022-05-18 | End: 2022-05-19 | Stop reason: HOSPADM

## 2022-05-18 RX ADMIN — CEFEPIME HYDROCHLORIDE 2000 MG: 2 INJECTION, POWDER, FOR SOLUTION INTRAVENOUS at 14:56

## 2022-05-18 RX ADMIN — PREDNISOLONE ACETATE 1 DROP: 10 SUSPENSION/ DROPS OPHTHALMIC at 21:38

## 2022-05-18 RX ADMIN — INSULIN GLARGINE 30 UNITS: 100 INJECTION, SOLUTION SUBCUTANEOUS at 21:38

## 2022-05-18 RX ADMIN — VANCOMYCIN HYDROCHLORIDE 1750 MG: 1 INJECTION, POWDER, LYOPHILIZED, FOR SOLUTION INTRAVENOUS at 16:06

## 2022-05-18 RX ADMIN — LOSARTAN POTASSIUM 50 MG: 50 TABLET, FILM COATED ORAL at 09:11

## 2022-05-18 RX ADMIN — INSULIN LISPRO 13 UNITS: 100 INJECTION, SOLUTION INTRAVENOUS; SUBCUTANEOUS at 09:13

## 2022-05-18 RX ADMIN — TRIAMTERENE AND HYDROCHLOROTHIAZIDE 1 TABLET: 37.5; 25 TABLET ORAL at 09:11

## 2022-05-18 RX ADMIN — OFLOXACIN 1 DROP: 3 SOLUTION/ DROPS OPHTHALMIC at 22:26

## 2022-05-18 RX ADMIN — THIAMINE HCL TAB 100 MG 100 MG: 100 TAB at 09:11

## 2022-05-18 RX ADMIN — OFLOXACIN 1 DROP: 3 SOLUTION/ DROPS OPHTHALMIC at 13:27

## 2022-05-18 RX ADMIN — INSULIN LISPRO 1 UNITS: 100 INJECTION, SOLUTION INTRAVENOUS; SUBCUTANEOUS at 16:11

## 2022-05-18 RX ADMIN — INSULIN LISPRO 1 UNITS: 100 INJECTION, SOLUTION INTRAVENOUS; SUBCUTANEOUS at 09:13

## 2022-05-18 RX ADMIN — OFLOXACIN 1 DROP: 3 SOLUTION/ DROPS OPHTHALMIC at 18:21

## 2022-05-18 RX ADMIN — B-COMPLEX W/ C & FOLIC ACID TAB 1 TABLET: TAB at 09:11

## 2022-05-18 RX ADMIN — PREDNISOLONE ACETATE 1 DROP: 10 SUSPENSION/ DROPS OPHTHALMIC at 16:07

## 2022-05-18 RX ADMIN — ENOXAPARIN SODIUM 40 MG: 40 INJECTION SUBCUTANEOUS at 09:11

## 2022-05-18 RX ADMIN — DILTIAZEM HYDROCHLORIDE 180 MG: 180 CAPSULE, COATED, EXTENDED RELEASE ORAL at 09:11

## 2022-05-18 RX ADMIN — FOLIC ACID 1 MG: 1 TABLET ORAL at 09:11

## 2022-05-18 RX ADMIN — INSULIN LISPRO 2 UNITS: 100 INJECTION, SOLUTION INTRAVENOUS; SUBCUTANEOUS at 13:29

## 2022-05-18 RX ADMIN — CEFEPIME HYDROCHLORIDE 2000 MG: 2 INJECTION, POWDER, FOR SOLUTION INTRAVENOUS at 04:51

## 2022-05-18 RX ADMIN — VANCOMYCIN HYDROCHLORIDE 1750 MG: 1 INJECTION, POWDER, LYOPHILIZED, FOR SOLUTION INTRAVENOUS at 05:48

## 2022-05-18 NOTE — H&P
[facility]  H&P- Shivani Lazo 1955, 74 y o  male MRN: [id number]  Unit/Bed#: W -01 Encounter: [id number]  Primary Care Provider: Jairo Duckworth MD   Date and time admitted to hospital: 5/17/2022  2:34 PM    * Diabetic ulcer of left foot associated with type 2 diabetes mellitus [facility])  Assessment & Plan  Lab Results   Component Value Date    HGBA1C 7 5 (H) 12/29/2021       No results for input(s): POCGLU in the last 72 hours  Blood Sugar Average: Last 72 hrs:   as evidenced by shallow ulcer noted proximally to the 2nd toe of the left foot  Patient reports that it started off as a blister 1 week ago filled with clear fluid, today drained dark foul-smelling drainage  Patient reports that he has had a callus there for approximately 4 months  Imaging not showing signs of osteomyelitis  Have low suspicion for infection given lack of drainage or surrounding erythema at the time of my evaluation, however will start patient on empiric antibiotics given elevated serum glucose, WBC less than 4 and history of diabetes with neuropathy    Plan:  Start patient on cefepime and vancomycin  Podiatry consulted-appreciate input  Continue trending fever curves  Monitor WBC via daily CBC    Type 2 diabetes mellitus ([facility] [address] )  Assessment & Plan  Lab Results   Component Value Date    HGBA1C 7 5 (H) 12/29/2021       No results for input(s): POCGLU in the last 72 hours      Blood Sugar Average: Last 72 hrs:   patient currently maintained on Amaryl and Jardiance    Plan:  Recheck HbA1c  Will hold patient's home medications during admission  Will start patient on basal bolus regimen with SSI  Continue Accu-Cheks  Diabetic diet ordered  Hypoglycemia protocol    Alcohol abuse  Assessment & Plan  Patient reporting history of alcohol use  States that drinks the occasional beer  Last drink-yesterday    Plan:  CIWA protocol ordered  Thiamine ordered  Folate ordered    Thrombocytopenia ([address] )  Assessment & Plan  Recent Labs     05/17/22  1527   PLT 66*     Chronic, likely in the setting of alcoholic cirrhosis  Will continue monitoring during admission      Leukopenia  Assessment & Plan  Suspect this is likely secondary to cirrhosis  Today worse than prior  Possibly due to infected diabetic foot ulcer verses chronic worsening in the setting of worsening cirrhosis  Will continue monitoring    MEG (obstructive sleep apnea)  Assessment & Plan  Patient using CPAP at home  Will order CPAP, however patient unsure of settings    Benign essential HTN  Assessment & Plan  Continue home Cardizem, losartan and Maxzide      VTE Pharmacologic Prophylaxis: VTE Score: 4 Moderate Risk (Score 3-4) - Pharmacological DVT Prophylaxis Ordered: enoxaparin (Lovenox)  Code Status: Level 1 - Full Code   Discussion with family: Updated  (wife) at bedside  Anticipated Length of Stay: Patient will be admitted on an inpatient basis with an anticipated length of stay of greater than 2 midnights secondary to Diabetic foot ulcer  Chief Complaint:  Diabetic foot ulcer    History of Present Illness:  Isabell Luna is a 74 y o  male with a PMH of type 2 diabetes mellitus, MEG, alcohol use disorder, cirrhosis, thrombocytopenia, hypertension who presents with a one-week history of a blister located on the plantar surface of the left lower extremity proximally to the toes which was initially filled with clear fluid  Patient notes that today the blister drained dark, foul-smelling drainage  He reports that he has had a callus in this area for approximately 4 months  Denies pain, fevers, chills  States that he has a history of neuropathy secondary to diabetes  Follows up with Podiatry, last visited in November of 2021  Review of Systems:  Review of Systems   Constitutional: Negative for activity change, appetite change, chills, fever and unexpected weight change     HENT: Negative for congestion, postnasal drip, rhinorrhea, sinus pressure and sore throat  Eyes: Negative for photophobia and visual disturbance  Respiratory: Negative for cough, chest tightness, shortness of breath and wheezing  Cardiovascular: Negative for chest pain, palpitations and leg swelling  Gastrointestinal: Negative for abdominal distention, abdominal pain, constipation, diarrhea, nausea and vomiting  Endocrine: Negative for polydipsia and polyuria  Genitourinary: Negative for difficulty urinating, dysuria, frequency and hematuria  Musculoskeletal: Negative for arthralgias, back pain and myalgias  Skin: Positive for wound (Plantar surface of left lower extremity)  Negative for color change, pallor and rash  Neurological: Negative for dizziness, facial asymmetry, weakness, light-headedness, numbness and headaches  Psychiatric/Behavioral: Negative for agitation, behavioral problems, confusion and dysphoric mood  All other systems reviewed and are negative  Past Medical and Surgical History:   Past Medical History:   Diagnosis Date    Arrhythmia     Diabetes mellitus ([facility] [address] )     History of echocardiogram 11/14/2017    showed EF of 50-55 percentWith moderate LVH and left ventricle diastolic dysfunction  Left atrium was moderately enlarged  Trace MR noted   History of Holter monitoring 11/21/2017    showed baseline rhythm of sinus origin with an average heart it of 61 bpm  The lowest heart rate was 49 and the highest heart rate was 10 8 bpm  There were rare single VPCs, and frequent PACs representing 3 2% of total beats  There were several episodes of sinus arrhythmias with sinus bradycardia and heart rate ranging from 40-90 bpm  No sustained dysrhythmias, or pauses noted   The patient did not    Hypertension     Obese        Past Surgical History:   Procedure Laterality Date    EYE SURGERY Left 1997    HERNIA REPAIR  [id number]    KNEE ARTHROPLASTY Right 2008    SHOULDER SURGERY Right 2002       Meds/Allergies:  Prior to Admission medications Medication Sig Start Date End Date Taking? Authorizing Provider   benzonatate (TESSALON PERLES) 100 mg capsule Take 1 capsule (100 mg total) by mouth 3 (three) times a day as needed for cough 3/25/22   Paul Marshall5 Arnie Corona, PAPeriC   cholecalciferol (VITAMIN D3) 1,000 units tablet Take 2 tablets (2,000 Units total) by mouth daily 12/28/20 1/27/21  Katia Victor MD   diltiazem (CARDIZEM CD) 180 mg 24 hr capsule  10/22/20   Historical Provider, MD   ferrous sulfate 325 (65 Fe) mg tablet Take 1 tablet (325 mg total) by mouth daily with breakfast 12/28/20 1/27/21  Katia Victor MD   folic acid (FOLVITE) 1 mg tablet Take 1 tablet (1 mg total) by mouth daily 12/28/20 1/27/21  Katia Victro MD   glimepiride (AMARYL) 4 mg tablet 2 (two) times a day  10/22/20   Historical Provider, MD   loratadine (CLARITIN) 10 mg tablet Take 1 tablet (10 mg total) by mouth daily 12/28/20 1/27/21  Katia Victor MD   losartan (COZAAR) 50 mg tablet  10/23/20   Historical Provider, MD   Multiple Vitamin (multivitamin) capsule Take 1 capsule by mouth daily 1/3/21   Beryl Guevara MD   pantoprazole (PROTONIX) 40 mg tablet Take 1 tablet (40 mg total) by mouth daily On an empty stomatch, at least 30 mins before breakfast 12/27/20 1/26/21  Katia Victor MD   promethazine (PHENERGAN) 12 5 mg/10 mL syrup Take by mouth 4 (four) times a day as needed for nausea or vomiting  Patient not taking: Reported on 7/15/2021    Historical Provider, MD   thiamine 100 MG tablet Take 1 tablet (100 mg total) by mouth daily 12/28/20 1/27/21  Katia Victor MD   triamterene-hydrochlorothiazide (MAXZIDE-25) 37 5-25 mg per tablet Take 1 tablet by mouth daily    Historical Provider, MD   fluticasone-salmeterol (ADVAIR HFA) 115-21 MCG/ACT inhaler Inhale 2 puffs 2 (two) times a day Rinse mouth after use    Patient not taking: Reported on 7/15/2021  5/17/22  Historical Provider, MD   Glyxambi 25-5 MG TABS  10/22/20 5/17/22  Historical Provider, MD   guaiFENesin (ROBITUSSIN) 100 MG/5ML oral liquid Take 10 mL (200 mg total) by mouth 3 (three) times a day as needed for cough  Patient not taking: Reported on 7/15/2021 1/3/21 5/17/22  Gi Garcia MD     I have reviewed home medications with patient personally  Allergies: Allergies   Allergen Reactions    Sulfa Antibiotics        Social History:  Marital Status: /Civil Union   Patient Pre-hospital Living Situation: Home  Patient Pre-hospital Level of Mobility: walks  Patient Pre-hospital Diet Restrictions:  None  Substance Use History:   Social History     Substance and Sexual Activity   Alcohol Use Not Currently    Comment: quit      Social History     Tobacco Use   Smoking Status Former Smoker    Packs/day: 0 50    Years: 20 00    Pack years: 10 00    Types: Cigarettes    Quit date: 44    Years since quittin 3   Smokeless Tobacco Never Used     Social History     Substance and Sexual Activity   Drug Use Never       Family History:  Family History   Problem Relation Age of Onset    Diabetes Mother     Supraventricular tachycardia Mother     COPD Father     Heart disease Father     Other Father         Sepsis; related to UTI with complicating cardiac problems    Heart disease Paternal Grandmother        Physical Exam:     Vitals:   Blood Pressure: 158/84 (22)  Pulse: 74 (22)  Temperature: 97 8 °F (36 6 °C) (22)  Temp Source: Oral (22 135)  Respirations: 16 (22)  Height: 5' 11" (180 3 cm) (22 1357)  SpO2: 99 % (22)    Physical Exam  Vitals and nursing note reviewed  Constitutional:       General: He is not in acute distress  Appearance: Normal appearance  He is not ill-appearing, toxic-appearing or diaphoretic  HENT:      Head: Normocephalic and atraumatic  Nose: Nose normal       Mouth/Throat:      Mouth: Mucous membranes are moist       Pharynx: Oropharynx is clear  Eyes:      General: No scleral icterus  Right eye: No discharge  Left eye: No discharge  Extraocular Movements: Extraocular movements intact  Conjunctiva/sclera: Conjunctivae normal    Cardiovascular:      Rate and Rhythm: Normal rate and regular rhythm  Pulses: Normal pulses  Heart sounds: Normal heart sounds  No murmur heard  Pulmonary:      Effort: Pulmonary effort is normal  No respiratory distress  Breath sounds: Normal breath sounds  No wheezing, rhonchi or rales  Abdominal:      General: Abdomen is flat  Bowel sounds are normal  There is no distension  Palpations: Abdomen is soft  Tenderness: There is no abdominal tenderness  There is no guarding or rebound  Musculoskeletal:      Cervical back: Normal range of motion and neck supple  Right lower leg: No edema  Left lower leg: No edema  Feet:    Feet:      Left foot:      Skin integrity: Ulcer, blister and callus present  Skin:     General: Skin is warm and dry  Coloration: Skin is not jaundiced or pale  Neurological:      Mental Status: He is alert and oriented to person, place, and time  Mental status is at baseline  Psychiatric:         Mood and Affect: Mood normal          Behavior: Behavior normal          Thought Content:  Thought content normal          Judgment: Judgment normal         Additional Data:     Lab Results:  Results from last 7 days   Lab Units 05/17/22  1527   WBC Thousand/uL 3 44*   HEMOGLOBIN g/dL 12 7   HEMATOCRIT % 40 5   PLATELETS Thousands/uL 66*   NEUTROS PCT % 71   LYMPHS PCT % 15   MONOS PCT % 9   EOS PCT % 3     Results from last 7 days   Lab Units 05/17/22  1527   SODIUM mmol/L 137   POTASSIUM mmol/L 4 3   CHLORIDE mmol/L 101   CO2 mmol/L 24   BUN mg/dL 26*   CREATININE mg/dL 0 97   ANION GAP mmol/L 12   CALCIUM mg/dL 9 6   GLUCOSE RANDOM mg/dL 243*                       Imaging: Reviewed radiology reports from this admission including: XR left foot  XR foot 3+ views LEFT   Final Result by Lilian Powers MD (05/17 9239)      No acute osseous abnormality nor osteomyelitis  Workstation performed: [id number]             EKG and Other Studies Reviewed on Admission:   · EKG: No EKG obtained  ** Please Note: This note has been constructed using a voice recognition system   **

## 2022-05-18 NOTE — PROGRESS NOTES
[facility]  Progress Note Crystal President 1955, 74 y o  male MRN: [id number]  Unit/Bed#: W -01 Encounter: [id number]  Primary Care Provider: Colleen Crain MD   Date and time admitted to hospital: 5/17/2022  2:34 PM    * Diabetic ulcer of left foot associated with type 2 diabetes mellitus [facility])  Assessment & Plan  Lab Results   Component Value Date    HGBA1C 9 1 (H) 05/17/2022       Recent Labs     05/17/22  2310 05/18/22  0717 05/18/22  1124 05/18/22  1603   POCGLU 258* 180* 205* 173*       Blood Sugar Average: Last 72 hrs:  (P) 204    as evidenced by shallow ulcer noted proximally to the 2nd toe of the left foot  Patient reports that it started off as a blister 1 week ago filled with clear fluid, today drained dark foul-smelling drainage  Patient reports that he has had a callus there for approximately 4 months  Imaging not showing signs of osteomyelitis  Have low suspicion for infection given lack of drainage or surrounding erythema at the time of my evaluation, however will start patient on empiric antibiotics given elevated serum glucose, WBC less than 4 and history of diabetes with neuropathy    Plan:  Podiatry consulted-appreciate input  Continue cefepime and vancomycin, anticipate transition to Keflex tomorrow  Continue trending fever curves  Monitor WBC via daily CBC    Type 2 diabetes mellitus (HCC)  Assessment & Plan  Lab Results   Component Value Date    HGBA1C 9 1 (H) 05/17/2022       Recent Labs     05/17/22  2310 05/18/22  0717 05/18/22  1124 05/18/22  1603   POCGLU 258* 180* 205* 173*       Blood Sugar Average: Last 72 hrs:  (P) 204    patient currently maintained on Amaryl and Jardiance  HbA1c noted to be more elevated than prior  Plan:   Will hold patient's home medications during admission  Will start patient on basal bolus regimen with SSI and adjust as needed  Continue Accu-Cheks  Diabetic diet ordered  Hypoglycemia protocol    Alcohol abuse  Assessment & Plan  Patient reporting history of alcohol use  States that drinks the occasional beer  Last drink-yesterday    Plan:  CIWA protocol ordered  Thiamine ordered  Folate ordered    Thrombocytopenia [facility])  Assessment & Plan  Recent Labs     22  1527 22  0434   PLT 66* 70*     Chronic, likely in the setting of alcoholic cirrhosis  Slightly improved today  Will continue monitoring during admission      Leukopenia  Assessment & Plan  Suspect this is likely secondary to cirrhosis  Today worse than prior  Possibly due to infected diabetic foot ulcer verses chronic worsening in the setting of worsening cirrhosis as patient still reports alcohol consumption  Will continue monitoring    MEG (obstructive sleep apnea)  Assessment & Plan  Patient using CPAP at home  Will order CPAP, however patient unsure of settings    Benign essential HTN  Assessment & Plan  Continue home Cardizem, losartan and Maxzide        VTE Pharmacologic Prophylaxis: VTE Score: 4 Moderate Risk (Score 3-4) - Pharmacological DVT Prophylaxis Ordered: enoxaparin (Lovenox)  Patient Centered Rounds: I performed bedside rounds with nursing staff today  Discussions with Specialists or Other Care Team Provider:  Podiatry    Education and Discussions with Family / Patient: Updated  (wife) via phone  Current Length of Stay: 1 day(s)  Current Patient Status: Inpatient   Discharge Plan: Anticipate discharge tomorrow to home  Code Status: Level 1 - Full Code    Subjective:   Patient not in acute distress, denies any pain after debridement of ulcer on left lower extremity  He has no complaints at this time  Objective:     Vitals:   Temp (24hrs), Av 7 °F (36 5 °C), Min:97 3 °F (36 3 °C), Max:98 1 °F (36 7 °C)    Temp:  [97 3 °F (36 3 °C)-98 1 °F (36 7 °C)] 97 3 °F (36 3 °C)  HR:  [58-82] 58  Resp:  [16-18] 16  BP: (120-158)/(67-84) 120/67  SpO2:  [95 %-99 %] 99 %  Body mass index is 35 87 kg/m²       Input and Output Summary (last 24 hours): Intake/Output Summary (Last 24 hours) at 5/18/2022 1815  Last data filed at 5/18/2022 1317  Gross per 24 hour   Intake 1080 ml   Output --   Net 1080 ml       Physical Exam:   Physical Exam  Vitals and nursing note reviewed  Constitutional:       General: He is not in acute distress  Appearance: Normal appearance  He is not ill-appearing, toxic-appearing or diaphoretic  HENT:      Head: Normocephalic and atraumatic  Nose: Nose normal       Mouth/Throat:      Mouth: Mucous membranes are moist       Pharynx: Oropharynx is clear  Eyes:      General: No scleral icterus  Right eye: No discharge  Left eye: No discharge  Extraocular Movements: Extraocular movements intact  Conjunctiva/sclera: Conjunctivae normal    Cardiovascular:      Rate and Rhythm: Normal rate and regular rhythm  Pulses: Normal pulses  Heart sounds: Normal heart sounds  No murmur heard  Pulmonary:      Effort: Pulmonary effort is normal  No respiratory distress  Breath sounds: Normal breath sounds  No wheezing, rhonchi or rales  Abdominal:      General: Abdomen is flat  Bowel sounds are normal  There is no distension  Palpations: Abdomen is soft  Tenderness: There is no abdominal tenderness  There is no guarding or rebound  Musculoskeletal:      Cervical back: Normal range of motion and neck supple  Right lower leg: No edema  Left lower leg: No edema  Comments: Left foot wrapped with ace bandage, status post debridement  Skin:     General: Skin is warm and dry  Coloration: Skin is not jaundiced or pale  Neurological:      Mental Status: He is alert and oriented to person, place, and time  Mental status is at baseline  Psychiatric:         Mood and Affect: Mood normal          Behavior: Behavior normal          Thought Content:  Thought content normal          Judgment: Judgment normal           Additional Data:     Labs:  Results from last 7 days   Lab Units 05/18/22  0434   WBC Thousand/uL 3 08*   HEMOGLOBIN g/dL 12 4   HEMATOCRIT % 40 1   PLATELETS Thousands/uL 70*   NEUTROS PCT % 68   LYMPHS PCT % 16   MONOS PCT % 10   EOS PCT % 5     Results from last 7 days   Lab Units 05/18/22  0434   SODIUM mmol/L 136   POTASSIUM mmol/L 4 0   CHLORIDE mmol/L 103   CO2 mmol/L 26   BUN mg/dL 22   CREATININE mg/dL 0 91   ANION GAP mmol/L 7   CALCIUM mg/dL 9 2   ALBUMIN g/dL 3 5   TOTAL BILIRUBIN mg/dL 1 29*   ALK PHOS U/L 144*   ALT U/L 48   AST U/L 44*   GLUCOSE RANDOM mg/dL 161*         Results from last 7 days   Lab Units 05/18/22  1603 05/18/22  1124 05/18/22  0717 05/17/22  2310   POC GLUCOSE mg/dl 173* 205* 180* 258*     Results from last 7 days   Lab Units 05/17/22  1527   HEMOGLOBIN A1C % 9 1*     Results from last 7 days   Lab Units 05/17/22  2215   LACTIC ACID mmol/L 1 4       Lines/Drains:  Invasive Devices  Report    Peripheral Intravenous Line  Duration           Peripheral IV 05/17/22 Left Forearm 1 day                      Imaging: Reviewed radiology reports from this admission including: XR foot    Recent Cultures (last 7 days):   Results from last 7 days   Lab Units 05/17/22  1508   BLOOD CULTURE  No Growth at 24 hrs  No Growth at 24 hrs         Last 24 Hours Medication List:   Current Facility-Administered Medications   Medication Dose Route Frequency Provider Last Rate    [START ON 5/19/2022] bromfenac sodium  1 drop Ophthalmic Daily Grecia Chaney MD      cefepime  2,000 mg Intravenous Q12H Grecia Chaney MD 2,000 mg (05/18/22 1456)    diltiazem  180 mg Oral Daily Grecia Chaney MD      enoxaparin  40 mg Subcutaneous Daily Grecia Chaney MD      folic acid  1 mg Oral Daily Grecia Chaney MD      insulin glargine  30 Units Subcutaneous HS Grecia Chaney MD      insulin lispro  1-6 Units Subcutaneous TID AC Grecia Chaney MD      losartan  50 mg Oral Daily Grecia Chaney MD      multivitamin stress formula 1 tablet Oral Daily Rock Vicente MD      ofloxacin  1 drop Left Eye 4x Daily Rock Vicente MD      prednisoLONE acetate  1 drop Left Eye TID Rock Vicente MD      thiamine  100 mg Oral Daily Rock Vicente MD      triamterene-hydrochlorothiazide  1 tablet Oral Daily Rock Vicente MD      vancomycin  20 mg/kg (Adjusted) Intravenous Q12H Rock Vicente MD 1,750 mg (05/18/22 1606)        Today, Patient Was Seen By: Rock Vicente MD    **Please Note: This note may have been constructed using a voice recognition system  **

## 2022-05-18 NOTE — ASSESSMENT & PLAN NOTE
Suspect this is likely secondary to cirrhosis  Today worse than prior  Possibly due to infected diabetic foot ulcer verses chronic worsening in the setting of worsening cirrhosis as patient still reports alcohol consumption  Will continue monitoring

## 2022-05-18 NOTE — PLAN OF CARE
Problem: PAIN - ADULT  Goal: Verbalizes/displays adequate comfort level or baseline comfort level  Description: Interventions:  - Encourage patient to monitor pain and request assistance  - Assess pain using appropriate pain scale  - Administer analgesics based on type and severity of pain and evaluate response  - Implement non-pharmacological measures as appropriate and evaluate response  - Consider cultural and social influences on pain and pain management  - Notify physician/advanced practitioner if interventions unsuccessful or patient reports new pain  Outcome: Progressing     Problem: INFECTION - ADULT  Goal: Absence or prevention of progression during hospitalization  Description: INTERVENTIONS:  - Assess and monitor for signs and symptoms of infection  - Monitor lab/diagnostic results  - Monitor all insertion sites, i e  indwelling lines, tubes, and drains  - Monitor endotracheal if appropriate and nasal secretions for changes in amount and color  - [place] appropriate cooling/warming therapies per order  - Administer medications as ordered  - Instruct and encourage patient and family to use good hand hygiene technique  - Identify and instruct in appropriate isolation precautions for identified infection/condition  Outcome: Progressing  Goal: Absence of fever/infection during neutropenic period  Description: INTERVENTIONS:  - Monitor WBC    Outcome: Progressing     Problem: SAFETY ADULT  Goal: Patient will remain free of falls  Description: INTERVENTIONS:  - Educate patient/family on patient safety including physical limitations  - Instruct patient to call for assistance with activity   - Consult OT/PT to assist with strengthening/mobility   - Keep Call bell within reach  - Keep bed low and locked with side rails adjusted as appropriate  - Keep care items and personal belongings within reach  - Initiate and maintain comfort rounds  - Make Fall Risk Sign visible to staff  - Offer Toileting every  Hours, in advance of need  - Initiate/Maintain alarm  - Obtain necessary fall risk management equipment:   - Apply yellow socks and bracelet for high fall risk patients  - Consider moving patient to room near nurses station  Outcome: Progressing  Goal: Maintain or return to baseline ADL function  Description: INTERVENTIONS:  -  Assess patient's ability to carry out ADLs; assess patient's baseline for ADL function and identify physical deficits which impact ability to perform ADLs (bathing, care of mouth/teeth, toileting, grooming, dressing, etc )  - Assess/evaluate cause of self-care deficits   - Assess range of motion  - Assess patient's mobility; develop plan if impaired  - Assess patient's need for assistive devices and provide as appropriate  - Encourage maximum independence but intervene and supervise when necessary  - Involve family in performance of ADLs  - Assess for home care needs following discharge   - Consider OT consult to assist with ADL evaluation and planning for discharge  - Provide patient education as appropriate  Outcome: Progressing  Goal: Maintains/Returns to pre admission functional level  Description: INTERVENTIONS:  - Perform BMAT or MOVE assessment daily    - Set and communicate daily mobility goal to care team and patient/family/caregiver  - Collaborate with rehabilitation services on mobility goals if consulted  - Perform Range of Motion  times a day  - Reposition patient every  hours    - Dangle patient  times a day  - Stand patient  times a day  - Ambulate patient  times a day  - Out of bed to chair  times a day   - Out of bed for meals times a day  - Out of bed for toileting  - Record patient progress and toleration of activity level   Outcome: Progressing     Problem: DISCHARGE PLANNING  Goal: Discharge to home or other facility with appropriate resources  Description: INTERVENTIONS:  - Identify barriers to discharge w/patient and caregiver  - Arrange for needed discharge resources and transportation as appropriate  - Identify discharge learning needs (meds, wound care, etc )  - Arrange for interpretive services to assist at discharge as needed  - Refer to Case Management Department for coordinating discharge planning if the patient needs post-hospital services based on physician/advanced practitioner order or complex needs related to functional status, cognitive ability, or social support system  Outcome: Progressing     Problem: Knowledge Deficit  Goal: Patient/family/caregiver demonstrates understanding of disease process, treatment plan, medications, and discharge instructions  Description: Complete learning assessment and assess knowledge base    Interventions:  - Provide teaching at level of understanding  - Provide teaching via preferred learning methods  Outcome: Progressing

## 2022-05-18 NOTE — ASSESSMENT & PLAN NOTE
Patient reporting history of alcohol use  States that drinks the occasional beer  Last drink-yesterday    Plan:  CIWA protocol ordered  Thiamine ordered  Folate ordered

## 2022-05-18 NOTE — ASSESSMENT & PLAN NOTE
Lab Results   Component Value Date    HGBA1C 9 1 (H) 05/17/2022       Recent Labs     05/17/22  2310 05/18/22  0717 05/18/22  1124 05/18/22  1603   POCGLU 258* 180* 205* 173*       Blood Sugar Average: Last 72 hrs:  (P) 204    as evidenced by shallow ulcer noted proximally to the 2nd toe of the left foot  Patient reports that it started off as a blister 1 week ago filled with clear fluid, today drained dark foul-smelling drainage  Patient reports that he has had a callus there for approximately 4 months  Imaging not showing signs of osteomyelitis  Have low suspicion for infection given lack of drainage or surrounding erythema at the time of my evaluation, however will start patient on empiric antibiotics given elevated serum glucose, WBC less than 4 and history of diabetes with neuropathy    Plan:  Podiatry consulted-appreciate input  Continue cefepime and vancomycin, anticipate transition to Keflex tomorrow  Continue trending fever curves  Monitor WBC via daily CBC

## 2022-05-18 NOTE — ASSESSMENT & PLAN NOTE
Lab Results   Component Value Date    HGBA1C 9 1 (H) 05/17/2022       Recent Labs     05/17/22  2310 05/18/22  0717 05/18/22  1124 05/18/22  1603   POCGLU 258* 180* 205* 173*       Blood Sugar Average: Last 72 hrs:  (P) 204    patient currently maintained on Amaryl and Jardiance  HbA1c noted to be more elevated than prior  Plan:   Will hold patient's home medications during admission  Will start patient on basal bolus regimen with SSI and adjust as needed  Continue Accu-Cheks  Diabetic diet ordered  Hypoglycemia protocol

## 2022-05-18 NOTE — QUICK NOTE
[facility]ist Service Attending Physician Supervision Note - Progress Note    I have seen and examined Clara Lao personally and have reviewed the medical record independently  I have reviewed all components of the note performed and documented by the resident physician  I personally performed all the required components for patient evaluation and examined the patient  I was the supervising / collaborating physician on May 18, 2022 saw and examined patient May 18, 2022 in the morning   I agree with the resident's findings and plan of care with the following additions / exceptions: This is a 57-year-old male with a diabetic foot ulcer on the left foot seen by Podiatry bedside debridement performed  Podiatry recommended 24 hours of IV antibiotics which should be completed this evening  The patient does have poorly-controlled sugars however and is requiring quite a lot of p r n  Insulin in addition to Lantus  Given this and risk of poor healing and ongoing infection of his blood sugars are poorly controlled in the outpatient setting will keep him for another day monitor sugars closely and adjust his Lantus to 30 units  If he does well on this dose he can be discharged tomorrow and new insulin start which is a he is agreeable to  Thankfully the patient is nontoxic appearing, heart sounds regular chest clear abdomen soft nondistended extremities no edema , ulcer on left sole of foot dressed no surrounding erythema  Education and Discussions with Family / Patient:  Discussed with patient resident updated family  I offered to call his wife he declined and stated he would call himself    Time Spent for Care: 30 minutes  More than 50% of total time spent on counseling and coordination of care as described above  I attest that this information is true, accurate, and complete to the best of my knowledge      Hazel Mejia MD

## 2022-05-18 NOTE — ASSESSMENT & PLAN NOTE
Recent Labs     05/17/22  1527   PLT 66*     Chronic, likely in the setting of alcoholic cirrhosis  Will continue monitoring during admission

## 2022-05-18 NOTE — PROGRESS NOTES
Vancomycin IV Pharmacy-to-Dose Consultation    Isabell Luna is a 74 y o  male who is currently receiving Vancomycin IV with management by the Pharmacy Consult service  Assessment/Plan:  The patient was reviewed  Renal function is stable and no signs or symptoms of nephrotoxicity and/or infusion reactions were documented in the chart  Based on todays assessment, continue current vancomycin (day # 2) dosing of 1750 mg every 12 hours, with a plan for trough to be drawn at 1530 on 5/19  We will continue to follow the patients culture results and clinical progress daily      Lyndon Breen, Pharmacist

## 2022-05-18 NOTE — ASSESSMENT & PLAN NOTE
Lab Results   Component Value Date    HGBA1C 7 5 (H) 12/29/2021       No results for input(s): POCGLU in the last 72 hours      Blood Sugar Average: Last 72 hrs:   as evidenced by shallow ulcer noted proximally to the 2nd toe of the left foot  Patient reports that it started off as a blister 1 week ago filled with clear fluid, today drained dark foul-smelling drainage  Patient reports that he has had a callus there for approximately 4 months  Imaging not showing signs of osteomyelitis  Have low suspicion for infection given lack of drainage or surrounding erythema at the time of my evaluation, however will start patient on empiric antibiotics given elevated serum glucose, WBC less than 4 and history of diabetes with neuropathy    Plan:  Start patient on cefepime and vancomycin  Podiatry consulted-appreciate input  Continue trending fever curves  Monitor WBC via daily CBC

## 2022-05-18 NOTE — DISCHARGE INSTR - AVS FIRST PAGE
Dear Kaelyn Fajardo,     It was our pleasure to care for you here at [facility]  It is our hope that we were always able to exceed the expected standards for your care during your stay  You were hospitalized due to a diabetic foot ulcer  You were cared for on the [place] 4th floor by Binta French MD under the service of Ulices Thorpe MD with the [facility] who covers for your primary care physician (PCP), Belen Melendez MD, while you were hospitalized  If you have any questions or concerns related to this hospitalization, you may contact us at [phone]  For follow up as well as any medication refills, we recommend that you follow up with your primary care physician  A registered nurse will reach out to you by phone within a few days after your discharge to answer any additional questions that you may have after going home  However, at this time we provide for you here, the most important instructions / recommendations at discharge:     Notable Medication Adjustments -   Please start taking Cephalexin (Keflex) 500mg, 1 capsule every 6 hours for 4 days  Please stop taking your Empagliflozide (Jardiance) and Glimepiride (Amaryl) until you are seen by your primary care provider  Please start administering Insulin Glargine (Lantus) 30 Units every evening before bedtime  On the night before your ophthalmologic procedure (eye surgery), please only take 15 units if you are not allowed to eat after midnight  If you can still eat after midnight, please take your full dose  Testing Required after Discharge -   None  Important follow up information -   Please reach out to your eye doctor's office to discuss your recent admission and upcoming procedure  Please follow up with podiatry on an outpatient basis  Please follow up with your PCP within a week of discharge    Other Instructions -   Please contact your primary care provider, call 911 or go to the nearest emergency department for worsening symptoms  Practice Social distancing  Please practie adequate hand washing techinque  Wear a mask in public at all times  Please review this entire after visit summary as additional general instructions including medication list, appointments, activity, diet, any pertinent wound care, and other additional recommendations from your care team that may be provided for you        Sincerely,     Hamida Tierney MD

## 2022-05-18 NOTE — PROGRESS NOTES
Vancomycin Assessment    Clara Lao is a 74 y o  male who is currently receiving vancomycin 1g IV q12h for skin-soft tissue infection     Relevant clinical data and objective history reviewed:  Creatinine   Date Value Ref Range Status   05/17/2022 0 97 0 60 - 1 30 mg/dL Final     Comment:     Standardized to IDMS reference method   03/27/2022 1 28 0 60 - 1 30 mg/dL Final     Comment:     Standardized to IDMS reference method   01/03/2021 1 02 0 60 - 1 30 mg/dL Final     Comment:     Standardized to IDMS reference method     /84   Pulse 74   Temp 97 8 °F (36 6 °C)   Resp 16   Ht 5' 11" (1 803 m)   SpO2 99%   BMI 34 31 kg/m²   I/O last 3 completed shifts: In: 500 [IV Piggyback:500]  Out: -   Lab Results   Component Value Date/Time    BUN 26 (H) 05/17/2022 03:27 PM    WBC 3 44 (L) 05/17/2022 03:27 PM    HGB 12 7 05/17/2022 03:27 PM    HCT 40 5 05/17/2022 03:27 PM    MCV 91 05/17/2022 03:27 PM    PLT 66 (L) 05/17/2022 03:27 PM     Temp Readings from Last 3 Encounters:   05/17/22 97 8 °F (36 6 °C)   04/11/22 98 2 °F (36 8 °C)   03/27/22 99 6 °F (37 6 °C) (Oral)     Vancomycin Days of Therapy: 1    Assessment/Plan  The patient is currently on vancomycin utilizing scheduled dosing based on adjusted body weight (due to obesity)  Baseline risks associated with therapy include: advanced age  The patient is currently receiving 1g IV q12h and after clinical evaluation will be changed to 1750mg IV q12h  Pharmacy will also follow closely for s/sx of nephrotoxicity, infusion reactions, and appropriateness of therapy  BMP and CBC will be ordered per protocol  Plan for trough as patient approaches steady state, prior to the 5th  dose at approximately 1530 on 5/19  Due to infection severity, will target a trough of 15-20 (appropriate for most indications)   Pharmacy will continue to follow the patients culture results and clinical progress daily      Yudelka Irvin, Pharmacist

## 2022-05-18 NOTE — ASSESSMENT & PLAN NOTE
Suspect this is likely secondary to cirrhosis  Today worse than prior  Possibly due to infected diabetic foot ulcer verses chronic worsening in the setting of worsening cirrhosis  Will continue monitoring

## 2022-05-18 NOTE — ASSESSMENT & PLAN NOTE
Lab Results   Component Value Date    HGBA1C 9 1 (H) 05/17/2022       Recent Labs     05/17/22  2310 05/18/22  0717 05/18/22  1124 05/18/22  1603   POCGLU 258* 180* 205* 173*       Blood Sugar Average: Last 72 hrs:  (P) 204

## 2022-05-18 NOTE — ASSESSMENT & PLAN NOTE
Lab Results   Component Value Date    HGBA1C 7 5 (H) 12/29/2021       No results for input(s): POCGLU in the last 72 hours      Blood Sugar Average: Last 72 hrs:   patient currently maintained on Amaryl and Jardiance    Plan:  Recheck HbA1c  Will hold patient's home medications during admission  Will start patient on basal bolus regimen with SSI  Continue Accu-Cheks  Diabetic diet ordered  Hypoglycemia protocol

## 2022-05-18 NOTE — CONSULTS
Consult - Podiatry   Isabellbeatrice Luna 74 y o  male MRN: [id number]  Unit/Bed#: W -01 Encounter: [id number]    Assessment/Plan     Assessment:  1  Left foot DM ulcer with fat layer exposed  2  DM neuropathy    Plan:  1  Wound is relatively superficial, there is no cellulitis or pus  I did debride at bedside, see procedure below  Dermagram gauze  2  WB to heel in wedge shoe which I ordered  3  Patient can DC after 24 hours IV abx  I would then give 5 more days oral keflex  The wound is not acutely infected  It just needs local care and offloading  4  F/U [place] office next week     -Educated on DM risk to lower extremities, proper shoe gear, and daily monitoring of feet    -Educated on A1C and the risks of poorly controlled Diabetes  Reviewed recent A1C, 7 5  -Discussed weight loss and suitable exercise regiment  -Reviewed ED bloodwork, vitals, admission note    DEBRIDEMENT NOTE    A formal timeout including patient identification, laterality and existing allergies using [facility] was conducted  Procedure Performed: Debridement of subcutaneous tissue- >20 sq cm (64219)  Under aseptic technique, the wound was thoroughly explored and bluntly probed for undermining, deep sinus tracts and bone involvement  Sterile instrumentation including scalpel, forceps and curette used to excise the clearly delineated devitalized subcutaneous tissue (fibrotic tissue and necrotic non-viable portions of fat) exposing viable tissue  After debridement, bleeding in the wound bed base was noted indicated viable subcutaneous tissue had been exposed  Bleeding controlled with gentle pressure  Patient tolerated debridement without complications  The wound was dressed  Post-debridement of wound measures 1 6 x 0 5 x 0 4cm  Total area of debridement was 0 8  Wound dressing technique and frequency described to patient   I am to be called if any signs of infection develop such as erythema, increased wound size or significant change in appearance of wound, odor, pain, increased or change in color of drainage, swelling, fever, chills, nightsweats  I reviewed these signs with the patient  I recommended limiting WB to bathroom priveleges and meal table and to use any prescribed offloading devices in an effort to allow proper rest and healing of the wounded area  I explained that good wound care and compliance are necessary to allow healing and to prevent toe loss or limb loss  History of Present Illness     HPI:  Geoffrey Castaneda is a 74 y o  male who presents with DM foot ulcer  HE has had a callus under his foot for quite some time  Over hte last few days it turned purple  Yesterday it burst open and smelled  His wife saw it and told him to come to the ED  HE is afebrile, WBC normal  Vitals stable  He states he has neuropathy  Consults  Review of Systems   Constitutional: Negative  HENT: Negative  Eyes: Negative  Respiratory: Negative  Cardiovascular: Negative  Gastrointestinal: Negative  Musculoskeletal: negative   Skin:ulcer left foot   Neurological: numbness in feet  Psych: negative      Historical Information   Past Medical History:   Diagnosis Date    Arrhythmia     [facility]     History of echocardiogram 11/14/2017    showed EF of 50-55 percentWith moderate LVH and left ventricle diastolic dysfunction  Left atrium was moderately enlarged  Trace MR noted   History of Holter monitoring 11/21/2017    showed baseline rhythm of sinus origin with an average heart it of 61 bpm  The lowest heart rate was 49 and the highest heart rate was 10 8 bpm  There were rare single VPCs, and frequent PACs representing 3 2% of total beats  There were several episodes of sinus arrhythmias with sinus bradycardia and heart rate ranging from 40-90 bpm  No sustained dysrhythmias, or pauses noted   The patient did not    Hypertension     Obese      Past Surgical History:   Procedure Laterality Date    EYE SURGERY Left 1997    HERNIA REPAIR  [id number]    KNEE ARTHROPLASTY Right 2008    SHOULDER SURGERY Right 2002     Social History   Social History     Substance and Sexual Activity   Alcohol Use Not Currently    Comment: quit      Social History     Substance and Sexual Activity   Drug Use Never     Social History     Tobacco Use   Smoking Status Former Smoker    Packs/day: 0 50    Years: 20 00    Pack years: 10 00    Types: Cigarettes    Quit date: 44    Years since quittin 3   Smokeless Tobacco Never Used     Family History:   Family History   Problem Relation Age of Onset    Diabetes Mother     Supraventricular tachycardia Mother     COPD Father     Heart disease Father     Other Father         Sepsis; related to UTI with complicating cardiac problems    Heart disease Paternal Grandmother        Meds/Allergies   Medications Prior to Admission   Medication    benzonatate (TESSALON PERLES) 100 mg capsule    cholecalciferol (VITAMIN D3) 1,000 units tablet    diltiazem (CARDIZEM CD) 180 mg 24 hr capsule    ferrous sulfate 325 (65 Fe) mg tablet    folic acid (FOLVITE) 1 mg tablet    glimepiride (AMARYL) 4 mg tablet    loratadine (CLARITIN) 10 mg tablet    losartan (COZAAR) 50 mg tablet    Multiple Vitamin (multivitamin) capsule    pantoprazole (PROTONIX) 40 mg tablet    promethazine (PHENERGAN) 12 5 mg/10 mL syrup    thiamine 100 MG tablet    triamterene-hydrochlorothiazide (MAXZIDE-25) 37 5-25 mg per tablet     Allergies   Allergen Reactions    Sulfa Antibiotics        Objective   First Vitals:   Blood Pressure: 140/67 (22 135)  Pulse: 87 (22 135)  Temperature: 98 1 °F (36 7 °C) (22 135)  Temp Source: Oral (22 135)  Respirations: 16 (22 135)  Height: 5' 11" (180 3 cm) (22 135)  SpO2: 97 % (22 135)    Current Vitals:   Blood Pressure: 158/84 (22)  Pulse: 74 (22)  Temperature: 97 8 °F (36 6 °C) (05/17/22 1942)  Temp Source: Oral (05/17/22 1357)  Respirations: 16 (05/17/22 1942)  Height: 5' 11" (180 3 cm) (05/17/22 1357)  SpO2: 99 % (05/17/22 1942)        /84   Pulse 74   Temp 97 8 °F (36 6 °C)   Resp 16   Ht 5' 11" (1 803 m)   SpO2 99%   BMI 34 31 kg/m²   Physical Exam:  General:    Alert, cooperative, no distress   Head:     Normocephalic, without obvious abnormality, atraumatic                   Skin:   LEft plantar foot with hemorrhagic callus and malodor  No cellulitis, no pus             Lungs:     Respirations unlabored   Chest wall:    No tenderness or deformity   Heart/vasc:    Regular rate and rhythm  Easily palpable pedal pulses  CRT brisk   Abdomen:     Soft, non-tender, no distention           Lower MSK:   Prominent metatarsal heads with hammertoe contracture lesser digits    No calf pain with compression, normal ankle ROM   Psychiatric:  AAOx3, no depression           Neurologic:   Diminished pinprick and vibratory sensation both forefoot     Lab Results:   Admission on 05/17/2022   Component Date Value    WBC 05/17/2022 3 44 (A)    RBC 05/17/2022 4 47     Hemoglobin 05/17/2022 12 7     Hematocrit 05/17/2022 40 5     MCV 05/17/2022 91     MCH 05/17/2022 28 4     MCHC 05/17/2022 31 4     RDW 05/17/2022 14 6     MPV 05/17/2022 13 3 (A)    Platelets 02/77/1466 66 (A)    nRBC 05/17/2022 0     Neutrophils Relative 05/17/2022 71     Immat GRANS % 05/17/2022 1     Lymphocytes Relative 05/17/2022 15     Monocytes Relative 05/17/2022 9     Eosinophils Relative 05/17/2022 3     Basophils Relative 05/17/2022 1     Neutrophils Absolute 05/17/2022 2 45     Immature Grans Absolute 05/17/2022 0 02     Lymphocytes Absolute 05/17/2022 0 52 (A)    Monocytes Absolute 05/17/2022 0 31     Eosinophils Absolute 05/17/2022 0 11     Basophils Absolute 05/17/2022 0 03     Sodium 05/17/2022 137     Potassium 05/17/2022 4 3     Chloride 05/17/2022 101     CO2 05/17/2022 24     ANION GAP 05/17/2022 12     BUN 05/17/2022 26 (A)    Creatinine 05/17/2022 0 97     Glucose 05/17/2022 243 (A)    Calcium 05/17/2022 9 6     eGFR 05/17/2022 79     Blood Culture 05/17/2022 Received in Microbiology Lab  Culture in Progress   Blood Culture 05/17/2022 Received in Microbiology Lab  Culture in Progress  Results from last 7 days   Lab Units 05/17/22  1508   BLOOD CULTURE  Received in Microbiology Lab  Culture in Progress  Received in Microbiology Lab  Culture in Progress  Imaging: I have personally reviewed pertinent films in PACS      Code Status: Level 1 - Full Code        Portions of the record may have been created with voice recognition software  Occasional wrong word or "sound a like" substitutions may have occurred due to the inherent limitations of voice recognition software  Read the chart carefully and recognize, using context, where substitutions have occurred

## 2022-05-19 VITALS
DIASTOLIC BLOOD PRESSURE: 67 MMHG | BODY MASS INDEX: 35.79 KG/M2 | TEMPERATURE: 97.6 F | RESPIRATION RATE: 15 BRPM | HEART RATE: 77 BPM | OXYGEN SATURATION: 98 % | HEIGHT: 70 IN | WEIGHT: 250 LBS | SYSTOLIC BLOOD PRESSURE: 129 MMHG

## 2022-05-19 LAB
ALBUMIN SERPL BCP-MCNC: 3.4 G/DL (ref 3.5–5)
ALP SERPL-CCNC: 139 U/L (ref 34–104)
ALT SERPL W P-5'-P-CCNC: 47 U/L (ref 7–52)
ANION GAP SERPL CALCULATED.3IONS-SCNC: 7 MMOL/L (ref 4–13)
AST SERPL W P-5'-P-CCNC: 45 U/L (ref 13–39)
BASOPHILS # BLD AUTO: 0.03 THOUSANDS/ΜL (ref 0–0.1)
BASOPHILS NFR BLD AUTO: 1 % (ref 0–1)
BILIRUB SERPL-MCNC: 1.38 MG/DL (ref 0.2–1)
BUN SERPL-MCNC: 21 MG/DL (ref 5–25)
CALCIUM ALBUM COR SERPL-MCNC: 9.5 MG/DL (ref 8.3–10.1)
CALCIUM SERPL-MCNC: 9 MG/DL (ref 8.4–10.2)
CHLORIDE SERPL-SCNC: 102 MMOL/L (ref 96–108)
CO2 SERPL-SCNC: 24 MMOL/L (ref 21–32)
CREAT SERPL-MCNC: 0.85 MG/DL (ref 0.6–1.3)
EOSINOPHIL # BLD AUTO: 0.18 THOUSAND/ΜL (ref 0–0.61)
EOSINOPHIL NFR BLD AUTO: 6 % (ref 0–6)
ERYTHROCYTE [DISTWIDTH] IN BLOOD BY AUTOMATED COUNT: 14.6 % (ref 11.6–15.1)
GFR SERPL CREATININE-BSD FRML MDRD: 89 ML/MIN/1.73SQ M
GLUCOSE SERPL-MCNC: 133 MG/DL (ref 65–140)
GLUCOSE SERPL-MCNC: 164 MG/DL (ref 65–140)
GLUCOSE SERPL-MCNC: 199 MG/DL (ref 65–140)
HCT VFR BLD AUTO: 40.7 % (ref 36.5–49.3)
HGB BLD-MCNC: 12.7 G/DL (ref 12–17)
IMM GRANULOCYTES # BLD AUTO: 0.01 THOUSAND/UL (ref 0–0.2)
IMM GRANULOCYTES NFR BLD AUTO: 0 % (ref 0–2)
LYMPHOCYTES # BLD AUTO: 0.58 THOUSANDS/ΜL (ref 0.6–4.47)
LYMPHOCYTES NFR BLD AUTO: 20 % (ref 14–44)
MCH RBC QN AUTO: 28.5 PG (ref 26.8–34.3)
MCHC RBC AUTO-ENTMCNC: 31.2 G/DL (ref 31.4–37.4)
MCV RBC AUTO: 92 FL (ref 82–98)
MONOCYTES # BLD AUTO: 0.33 THOUSAND/ΜL (ref 0.17–1.22)
MONOCYTES NFR BLD AUTO: 11 % (ref 4–12)
NEUTROPHILS # BLD AUTO: 1.85 THOUSANDS/ΜL (ref 1.85–7.62)
NEUTS SEG NFR BLD AUTO: 62 % (ref 43–75)
NRBC BLD AUTO-RTO: 0 /100 WBCS
PLATELET # BLD AUTO: 65 THOUSANDS/UL (ref 149–390)
PMV BLD AUTO: 12.1 FL (ref 8.9–12.7)
POTASSIUM SERPL-SCNC: 4.1 MMOL/L (ref 3.5–5.3)
PROT SERPL-MCNC: 6.5 G/DL (ref 6.4–8.4)
RBC # BLD AUTO: 4.45 MILLION/UL (ref 3.88–5.62)
SODIUM SERPL-SCNC: 133 MMOL/L (ref 135–147)
WBC # BLD AUTO: 2.98 THOUSAND/UL (ref 4.31–10.16)

## 2022-05-19 PROCEDURE — 99232 SBSQ HOSP IP/OBS MODERATE 35: CPT | Performed by: PODIATRIST

## 2022-05-19 PROCEDURE — 97760 ORTHOTIC MGMT&TRAING 1ST ENC: CPT

## 2022-05-19 PROCEDURE — 82948 REAGENT STRIP/BLOOD GLUCOSE: CPT

## 2022-05-19 PROCEDURE — 85025 COMPLETE CBC W/AUTO DIFF WBC: CPT

## 2022-05-19 PROCEDURE — 80053 COMPREHEN METABOLIC PANEL: CPT

## 2022-05-19 RX ORDER — INSULIN GLARGINE 100 [IU]/ML
30 INJECTION, SOLUTION SUBCUTANEOUS
Qty: 15 ML | Refills: 0 | Status: SHIPPED | OUTPATIENT
Start: 2022-05-19

## 2022-05-19 RX ORDER — PEN NEEDLE, DIABETIC 32GX 5/32"
NEEDLE, DISPOSABLE MISCELLANEOUS
Qty: 100 EACH | Refills: 0 | Status: SHIPPED | OUTPATIENT
Start: 2022-05-19

## 2022-05-19 RX ORDER — CEPHALEXIN 500 MG/1
500 CAPSULE ORAL EVERY 6 HOURS SCHEDULED
Qty: 16 CAPSULE | Refills: 0 | Status: SHIPPED | OUTPATIENT
Start: 2022-05-19 | End: 2022-05-23

## 2022-05-19 RX ADMIN — ENOXAPARIN SODIUM 40 MG: 40 INJECTION SUBCUTANEOUS at 08:00

## 2022-05-19 RX ADMIN — BROMFENAC SODIUM 1 DROP: 0.7 SOLUTION/ DROPS OPHTHALMIC at 08:02

## 2022-05-19 RX ADMIN — DILTIAZEM HYDROCHLORIDE 180 MG: 180 CAPSULE, COATED, EXTENDED RELEASE ORAL at 08:00

## 2022-05-19 RX ADMIN — CEFEPIME HYDROCHLORIDE 2000 MG: 2 INJECTION, POWDER, FOR SOLUTION INTRAVENOUS at 03:35

## 2022-05-19 RX ADMIN — B-COMPLEX W/ C & FOLIC ACID TAB 1 TABLET: TAB at 08:01

## 2022-05-19 RX ADMIN — FOLIC ACID 1 MG: 1 TABLET ORAL at 08:00

## 2022-05-19 RX ADMIN — TRIAMTERENE AND HYDROCHLOROTHIAZIDE 1 TABLET: 37.5; 25 TABLET ORAL at 08:00

## 2022-05-19 RX ADMIN — INSULIN LISPRO 1 UNITS: 100 INJECTION, SOLUTION INTRAVENOUS; SUBCUTANEOUS at 08:01

## 2022-05-19 RX ADMIN — OFLOXACIN 1 DROP: 3 SOLUTION/ DROPS OPHTHALMIC at 08:01

## 2022-05-19 RX ADMIN — THIAMINE HCL TAB 100 MG 100 MG: 100 TAB at 08:00

## 2022-05-19 RX ADMIN — LOSARTAN POTASSIUM 50 MG: 50 TABLET, FILM COATED ORAL at 08:00

## 2022-05-19 RX ADMIN — PREDNISOLONE ACETATE 1 DROP: 10 SUSPENSION/ DROPS OPHTHALMIC at 08:02

## 2022-05-19 RX ADMIN — INSULIN LISPRO 2 UNITS: 100 INJECTION, SOLUTION INTRAVENOUS; SUBCUTANEOUS at 11:48

## 2022-05-19 RX ADMIN — VANCOMYCIN HYDROCHLORIDE 1750 MG: 1 INJECTION, POWDER, LYOPHILIZED, FOR SOLUTION INTRAVENOUS at 04:21

## 2022-05-19 RX ADMIN — OFLOXACIN 1 DROP: 3 SOLUTION/ DROPS OPHTHALMIC at 11:48

## 2022-05-19 NOTE — ASSESSMENT & PLAN NOTE
Patient reporting history of alcohol use  States that drinks the occasional beer  Last drink-yesterday    Plan:  Advised to abstain from alcohol consumption

## 2022-05-19 NOTE — ASSESSMENT & PLAN NOTE
Lab Results   Component Value Date    HGBA1C 9 1 (H) 05/17/2022       Recent Labs     05/18/22  1603 05/18/22 2025 05/19/22  0724 05/19/22  1102   POCGLU 173* 184* 164* 199*       Blood Sugar Average: Last 72 hrs:  (P) 194 [id number]    as evidenced by shallow ulcer noted proximally to the 2nd toe of the left foot  Patient reports that it started off as a blister 1 week ago filled with clear fluid, today drained dark foul-smelling drainage  Patient reports that he has had a callus there for approximately 4 months  Imaging not showing signs of osteomyelitis  Have low suspicion for infection given lack of drainage or surrounding erythema at the time of my evaluation, however will start patient on empiric antibiotics given elevated serum glucose, WBC less than 4 and history of diabetes with neuropathy    Plan:  Podiatry consulted-appreciate input  Discharge patient on Keflex 500mg q6h x 4 days, patient completed a total of 6 days on antibiotics  Follow up with podiatry on outpatient basis  Continue wearing off-loading boot  Local wound care  Follow up with PCP

## 2022-05-19 NOTE — PLAN OF CARE
Problem: PAIN - ADULT  Goal: Verbalizes/displays adequate comfort level or baseline comfort level  Description: Interventions:  - Encourage patient to monitor pain and request assistance  - Assess pain using appropriate pain scale  - Administer analgesics based on type and severity of pain and evaluate response  - Implement non-pharmacological measures as appropriate and evaluate response  - Consider cultural and social influences on pain and pain management  - Notify physician/advanced practitioner if interventions unsuccessful or patient reports new pain  Outcome: Adequate for Discharge     Problem: INFECTION - ADULT  Goal: Absence or prevention of progression during hospitalization  Description: INTERVENTIONS:  - Assess and monitor for signs and symptoms of infection  - Monitor lab/diagnostic results  - Monitor all insertion sites, i e  indwelling lines, tubes, and drains  - Monitor endotracheal if appropriate and nasal secretions for changes in amount and color  - Lynn appropriate cooling/warming therapies per order  - Administer medications as ordered  - Instruct and encourage patient and family to use good hand hygiene technique  - Identify and instruct in appropriate isolation precautions for identified infection/condition  Outcome: Adequate for Discharge  Goal: Absence of fever/infection during neutropenic period  Description: INTERVENTIONS:  - Monitor WBC    Outcome: Adequate for Discharge     Problem: SAFETY ADULT  Goal: Patient will remain free of falls  Description: INTERVENTIONS:  - Educate patient/family on patient safety including physical limitations  - Instruct patient to call for assistance with activity   - Consult OT/PT to assist with strengthening/mobility   - Keep Call bell within reach  - Keep bed low and locked with side rails adjusted as appropriate  - Keep care items and personal belongings within reach  - Initiate and maintain comfort rounds  - Make Fall Risk Sign visible to staff  - Offer Toileting every Hours, in advance of need  - Initiate/Maintain alarm  - Obtain necessary fall risk management equipment:   - Apply yellow socks and bracelet for high fall risk patients  - Consider moving patient to room near nurses station  Outcome: Adequate for Discharge  Goal: Maintain or return to baseline ADL function  Description: INTERVENTIONS:  -  Assess patient's ability to carry out ADLs; assess patient's baseline for ADL function and identify physical deficits which impact ability to perform ADLs (bathing, care of mouth/teeth, toileting, grooming, dressing, etc )  - Assess/evaluate cause of self-care deficits   - Assess range of motion  - Assess patient's mobility; develop plan if impaired  - Assess patient's need for assistive devices and provide as appropriate  - Encourage maximum independence but intervene and supervise when necessary  - Involve family in performance of ADLs  - Assess for home care needs following discharge   - Consider OT consult to assist with ADL evaluation and planning for discharge  - Provide patient education as appropriate  Outcome: Adequate for Discharge  Goal: Maintains/Returns to pre admission functional level  Description: INTERVENTIONS:  - Perform BMAT or MOVE assessment daily    - Set and communicate daily mobility goal to care team and patient/family/caregiver  - Collaborate with rehabilitation services on mobility goals if consulted  - Perform Range of Motion  times a day  - Reposition patient every  hours    - Dangle patient  times a day  - Stand patient  times a day  - Ambulate patient  times a day  - Out of bed to chair  times a day   - Out of bed for meals  times a day  - Out of bed for toileting  - Record patient progress and toleration of activity level   Outcome: Adequate for Discharge     Problem: DISCHARGE PLANNING  Goal: Discharge to home or other facility with appropriate resources  Description: INTERVENTIONS:  - Identify barriers to discharge w/patient and caregiver  - Arrange for needed discharge resources and transportation as appropriate  - Identify discharge learning needs (meds, wound care, etc )  - Arrange for interpretive services to assist at discharge as needed  - Refer to Case Management Department for coordinating discharge planning if the patient needs post-hospital services based on physician/advanced practitioner order or complex needs related to functional status, cognitive ability, or social support system  Outcome: Adequate for Discharge     Problem: Knowledge Deficit  Goal: Patient/family/caregiver demonstrates understanding of disease process, treatment plan, medications, and discharge instructions  Description: Complete learning assessment and assess knowledge base    Interventions:  - Provide teaching at level of understanding  - Provide teaching via preferred learning methods  Outcome: Adequate for Discharge     Problem: Potential for Falls  Goal: Patient will remain free of falls  Description: INTERVENTIONS:  - Educate patient/family on patient safety including physical limitations  - Instruct patient to call for assistance with activity   - Consult OT/PT to assist with strengthening/mobility   - Keep Call bell within reach  - Keep bed low and locked with side rails adjusted as appropriate  - Keep care items and personal belongings within reach  - Initiate and maintain comfort rounds  - Make Fall Risk Sign visible to staff  - Offer Toileting every  Hours, in advance of need  - Initiate/Maintain alarm  - Obtain necessary fall risk management equipment:   - Apply yellow socks and bracelet for high fall risk patients  - Consider moving patient to room near nurses station  Outcome: Adequate for Discharge

## 2022-05-19 NOTE — DISCHARGE SUMMARY
[facility]  Discharge- Willadean Apley 1955, 74 y o  male MRN: [id number]  Unit/Bed#: ISMAEL RABAGO 401-01 Encounter: [id number]  Primary Care Provider: Balaji Oconnell MD   Date and time admitted to hospital: 5/17/2022  2:34 PM    * Diabetic ulcer of left foot associated with type 2 diabetes mellitus [facility])  Assessment & Plan  Lab Results   Component Value Date    HGBA1C 9 1 (H) 05/17/2022       Recent Labs     05/18/22  1603 05/18/22 2025 05/19/22  0724 05/19/22  1102   POCGLU 173* 184* 164* 199*       Blood Sugar Average: Last 72 hrs:  (P) 194 [id number]    as evidenced by shallow ulcer noted proximally to the 2nd toe of the left foot  Patient reports that it started off as a blister 1 week ago filled with clear fluid, today drained dark foul-smelling drainage  Patient reports that he has had a callus there for approximately 4 months  Imaging not showing signs of osteomyelitis  Have low suspicion for infection given lack of drainage or surrounding erythema at the time of my evaluation, however will start patient on empiric antibiotics given elevated serum glucose, WBC less than 4 and history of diabetes with neuropathy    Plan:  Podiatry consulted-appreciate input  Discharge patient on Keflex 500mg q6h x 4 days, patient completed a total of 6 days on antibiotics  Follow up with podiatry on outpatient basis  Continue wearing off-loading boot  Local wound care  Follow up with PCP    Type 2 diabetes mellitus [facility])  Assessment & Plan  Lab Results   Component Value Date    HGBA1C 9 1 (H) 05/17/2022       Recent Labs     05/18/22  1603 05/18/22 2025 05/19/22  0724 05/19/22  1102   POCGLU 173* 184* 164* 199*       Blood Sugar Average: Last 72 hrs:  (P) 194 [id number]    patient maintained on Amaryl and Jardiance  HbA1c noted to be more elevated than prior  Plan:  Patient discharged with Lantus pens, 30units qHS   Advised to take only half the dose if he needs to be NPO prior to procedure tomorrow  Advised to discontinue taking his PO medications until he is seen by his PCP  Advised to reach out to his ophthalmologist's office to inquire about procedure tomorrow       Alcohol abuse  Assessment & Plan  Patient reporting history of alcohol use  States that drinks the occasional beer  Last drink-yesterday    Plan:  Advised to abstain from alcohol consumption    Thrombocytopenia ([address] )  Assessment & Plan  Recent Labs     05/17/22  1527 05/18/22  0434 05/19/22  0429   PLT 66* 70* 65*     Chronic, likely in the setting of alcoholic cirrhosis  Patient advised to follow up with PCP    Leukopenia  Assessment & Plan  Suspect this is likely secondary to cirrhosis  Today worse than prior  Possibly due to infected diabetic foot ulcer verses chronic worsening in the setting of cirrhosis as patient still reports alcohol consumption  Follow up with PCP at discharge  EMG (obstructive sleep apnea)  Assessment & Plan  Continue using CPAP at home    Benign essential HTN  Assessment & Plan  Continue home Cardizem, losartan and Maxzide    Medical Problems             Resolved Problems  Date Reviewed: 8/19/2021   None               Discharging Resident: Christine Go MD  Discharging Attending: Kun Villegas MD  PCP: Dominik Camargo MD  Admission Date:   Admission Orders (From admission, onward)     Ordered        05/17/22 1808  Inpatient Admission  Once                      Discharge Date: 05/19/22    Consultations During Hospital Stay:  · Podiatry    Procedures Performed:   · Wound debridement    Significant Findings / Test Results:   · WBC-2 45  · Platelets-66  · Serum glucose 243  · Hemoglobin A1c -9 1    Incidental Findings:   · None    Test Results Pending at Discharge (will require follow up):   · None     Outpatient Tests Requested:  · None    Complications:  None    Reason for Admission:  Diabetic foot ulcer    Hospital Course:   Jose Bartholomew is a 74 y o  male patient who originally presented to the hospital on 5/17/2022 due to a diabetic foot ulcer of the left foot associated with dark foul-smelling drainage in the setting of chronic callus  Patient with neuropathy  On arrival, patient was asymptomatic, with unremarkable vital signs, with left foot ulcer which did not appear acutely infected  Patient was started on empiric antibiotics and admitted for further management  Patient was seen by Podiatry later in the day who performed debridement at the bedside  During this admission, it was noted that patient's sugars were poorly controlled and he was transitioned to Lantus 30 units qHS with improvement in serum glucose  He was advised to discontinue his PO regimen until he is seen by his PCP for optimizing of his DM regimen  He was provided with an off-loading boot at discharge and advised to follow up with Podiatry in       Please see above list of diagnoses and related plan for additional information  Condition at Discharge: good    Discharge Day Visit / Exam:   Subjective:  Patient found sitting in the chair, not in acute distress  States that he feels well  He has no complaints at this time  Vitals: Blood Pressure: 129/67 (05/19/22 3479)  Pulse: 77 (05/19/22 0638)  Temperature: 97 6 °F (36 4 °C) (05/19/22 0638)  Temp Source: Oral (05/17/22 1942)  Respirations: 15 (05/19/22 6039)  Height: 5' 10" (177 8 cm) (05/17/22 1942)  Weight - Scale: 113 kg (250 lb) (05/17/22 1942)  SpO2: 98 % (05/19/22 6994)    Exam:   Physical Exam  Vitals and nursing note reviewed  Constitutional:       General: He is not in acute distress  Appearance: Normal appearance  He is not ill-appearing, toxic-appearing or diaphoretic  HENT:      Head: Normocephalic and atraumatic  Nose: Nose normal       Mouth/Throat:      Mouth: Mucous membranes are moist       Pharynx: Oropharynx is clear  Eyes:      General: No scleral icterus  Right eye: No discharge  Left eye: No discharge        Extraocular Movements: Extraocular movements intact  Conjunctiva/sclera: Conjunctivae normal    Cardiovascular:      Rate and Rhythm: Normal rate and regular rhythm  Pulses: Normal pulses  Heart sounds: Normal heart sounds  No murmur heard  Pulmonary:      Effort: Pulmonary effort is normal  No respiratory distress  Breath sounds: Normal breath sounds  No wheezing, rhonchi or rales  Abdominal:      General: Abdomen is flat  Bowel sounds are normal  There is no distension  Palpations: Abdomen is soft  Tenderness: There is no abdominal tenderness  There is no guarding or rebound  Musculoskeletal:      Cervical back: Normal range of motion and neck supple  Right lower leg: No edema  Left lower leg: No edema  Comments: Left foot wrapped with ace bandage, status post debridement  Skin:     General: Skin is warm and dry  Coloration: Skin is not jaundiced or pale  Neurological:      Mental Status: He is alert and oriented to person, place, and time  Mental status is at baseline  Psychiatric:         Mood and Affect: Mood normal          Behavior: Behavior normal          Thought Content: Thought content normal          Judgment: Judgment normal           Discussion with Family: Updated  (wife) via phone  Discharge instructions/Information to patient and family:   See after visit summary for information provided to patient and family  Provisions for Follow-Up Care:  See after visit summary for information related to follow-up care and any pertinent home health orders  Disposition:   Home    Planned Readmission:  None    Discharge Medications:  See after visit summary for reconciled discharge medications provided to patient and/or family        **Please Note: This note may have been constructed using a voice recognition system**

## 2022-05-19 NOTE — CASE MANAGEMENT
Case Management Discharge Planning Note    Patient name Glenys Grant  Location W /W -54 MRN [id number]  : 1955 Date 2022       Current Admission Date: 2022  Current Admission Diagnosis:Diabetic ulcer of left foot associated with type 2 diabetes mellitus [facility])   Patient Active Problem List    Diagnosis Date Noted    Diabetic ulcer of left foot associated with type 2 diabetes mellitus [facility] ) 2022    Leukopenia 2022    Insomnia 2020    Elevated troponin 2020    Chest pressure 2020    Alcohol abuse 2020    Pneumonia due to COVID-19 virus 2020    Type 2 diabetes mellitus ([facility] ) 2020    Benign essential HTN 2020    ABILIO (acute kidney injury) ([place] [address] ) 2020    Cholelithiasis 8140    Alcoholic cirrhosis ([place] [address] )     MEG (obstructive sleep apnea) 2020    Anemia 2020    Thrombocytopenia ([place] [address] ) 2020      LOS (days): 2  Geometric Mean LOS (GMLOS) (days): 3 20  Days to GMLOS:1 4     OBJECTIVE:  Risk of Unplanned Readmission Score: 10 57         Current admission status: Inpatient   Preferred Pharmacy:   [facility] [place] [facility] [place], [address]  [address] [facility] [address]  Phone: [phone] Fax: [phone]    Primary Care Provider: Nito Morton MD    Primary Insurance: MEDICARE  Secondary Insurance: [facility]    DISCHARGE DETAILS:  Pt is cleared for discharge to home and will be discharging home kory Ferrell CM contacted pt's [address] and spoke with pharmacy staff who stated they did receive rx and pt will have an out of pocket pay of $81 97   Pt was informed of this and was okay with the cost

## 2022-05-19 NOTE — ASSESSMENT & PLAN NOTE
Lab Results   Component Value Date    HGBA1C 9 1 (H) 05/17/2022       Recent Labs     05/18/22  1603 05/18/22 2025 05/19/22  0724 05/19/22  1102   POCGLU 173* 184* 164* 199*       Blood Sugar Average: Last 72 hrs:  (P) 194 [id number]    patient maintained on Amaryl and Jardiance  HbA1c noted to be more elevated than prior  Plan:  Patient discharged with Lantus pens, 30units qHS  Advised to take only half the dose if he needs to be NPO prior to procedure tomorrow    Advised to discontinue taking his PO medications until he is seen by his PCP  Advised to reach out to his ophthalmologist's office to inquire about procedure tomorrow

## 2022-05-19 NOTE — PROGRESS NOTES
Progress Note - Podiatry  Abbey Lower 74 y o  male MRN: [id number]  Unit/Bed#: W -01 Encounter: [id number]    Assessment  1  Left foot DM ulcer with fat layer exposed  2  DM neuropathy    Plan:  1  Wound stable, no cellulitis, pus or malodor  Can DC home and followup at [facility] wound center next week  HE has dressings and his wife is comfortable with dressing changes  Subjective/Objective   Chief Complaint:   Chief Complaint   Patient presents with    Foot Ulcer     Pt reports L foot wound for a few days, started out as blister, but now oozing and turning black, hx diabetes and neuropathy, denies fevers        Subjective: 74 y o  y/o male seen and evaluated at bedside  No acute events overnight  Denies N/F/V/SOB/CP/cough/diarrhea/constipation  Blood pressure 129/67, pulse 77, temperature 97 6 °F (36 4 °C), resp  rate 15, height 5' 10" (1 778 m), weight 113 kg (250 lb), SpO2 98 %  ,Body mass index is 35 87 kg/m²  Lab Results   Component Value Date    WBC 2 98 (L) 05/19/2022    HGB 12 7 05/19/2022    HCT 40 7 05/19/2022    MCV 92 05/19/2022    PLT 65 (L) 05/19/2022     Lab Results   Component Value Date    CALCIUM 9 0 05/19/2022    K 4 1 05/19/2022    CO2 24 05/19/2022     05/19/2022    BUN 21 05/19/2022    CREATININE 0 85 05/19/2022         Invasive Devices  Report    Peripheral Intravenous Line  Duration           Peripheral IV 05/17/22 Left Forearm 1 day                Physical Exam:   General: alert, cooperative and no distress  Vascular: palpable pedal pulses, no cellulitis  Dermatology: ulcer plantar forefoot on left stable  Centrally it is full thickness but granular  NO cellulitis or pus  Neurological: LOPS   MSK: NO calf pain with compression, prominent metatarsal heads noted  Lab, Imaging and other studies:         Results from last 7 days   Lab Units 05/17/22  1508   BLOOD CULTURE  No Growth at 24 hrs  No Growth at 24 hrs                     Imaging: I have personally reviewed pertinent films in PACS          Portions of the record may have been created with voice recognition software  Occasional wrong word or "sound a like" substitutions may have occurred due to the inherent limitations of voice recognition software  Read the chart carefully and recognize, using context, where substitutions have occurred

## 2022-05-19 NOTE — PLAN OF CARE
Problem: PAIN - ADULT  Goal: Verbalizes/displays adequate comfort level or baseline comfort level  Description: Interventions:  - Encourage patient to monitor pain and request assistance  - Assess pain using appropriate pain scale  - Administer analgesics based on type and severity of pain and evaluate response  - Implement non-pharmacological measures as appropriate and evaluate response  - Consider cultural and social influences on pain and pain management  - Notify physician/advanced practitioner if interventions unsuccessful or patient reports new pain  Outcome: Progressing     Problem: INFECTION - ADULT  Goal: Absence or prevention of progression during hospitalization  Description: INTERVENTIONS:  - Assess and monitor for signs and symptoms of infection  - Monitor lab/diagnostic results  - Monitor all insertion sites, i e  indwelling lines, tubes, and drains  - Monitor endotracheal if appropriate and nasal secretions for changes in amount and color  - [place] appropriate cooling/warming therapies per order  - Administer medications as ordered  - Instruct and encourage patient and family to use good hand hygiene technique  - Identify and instruct in appropriate isolation precautions for identified infection/condition  Outcome: Progressing  Goal: Absence of fever/infection during neutropenic period  Description: INTERVENTIONS:  - Monitor WBC    Outcome: Progressing     Problem: DISCHARGE PLANNING  Goal: Discharge to home or other facility with appropriate resources  Description: INTERVENTIONS:  - Identify barriers to discharge w/patient and caregiver  - Arrange for needed discharge resources and transportation as appropriate  - Identify discharge learning needs (meds, wound care, etc )  - Arrange for interpretive services to assist at discharge as needed  - Refer to Case Management Department for coordinating discharge planning if the patient needs post-hospital services based on physician/advanced practitioner order or complex needs related to functional status, cognitive ability, or social support system  Outcome: Progressing     Problem: Knowledge Deficit  Goal: Patient/family/caregiver demonstrates understanding of disease process, treatment plan, medications, and discharge instructions  Description: Complete learning assessment and assess knowledge base    Interventions:  - Provide teaching at level of understanding  - Provide teaching via preferred learning methods  Outcome: Progressing

## 2022-05-19 NOTE — ASSESSMENT & PLAN NOTE
Recent Labs     05/17/22  1527 05/18/22  0434 05/19/22  0429   PLT 66* 70* 65*     Chronic, likely in the setting of alcoholic cirrhosis  Patient advised to follow up with PCP

## 2022-05-19 NOTE — PHYSICAL THERAPY NOTE
Physical Therapy Orthotic Note      Orthotic Fitting:   Time in : 13:51  Time out: 14:04  Total Time: 13 minutes    HPI/Orders: Order received for toe unloading shoe to left foot  Objective: Pt measured and fit for Remedy Pro Shoe while patient in sitting  Pt required XL size  Pt requires minimal assistance for donning /doffing brace at this time, as well as minimal verbal instruction  Pt verbalized good understanding of donning/doffing brace, when to wear brace, and monitoring skin for irritation  Cloteal Shawanda NSG and Sebastian CM aware  Recommendations: Pt will continue to use Remedy Pro Shoe as ordered and follow up w/ Podiatry as outpatient      Tisha Campos PT

## 2022-05-19 NOTE — DISCHARGE INSTR - OTHER ORDERS
Change dressing 3 times per week to left foot    Clean with NSS    Dress with dermagram gauze, DSD, ACe bandage      Weight bearing to heel only in wedge shoe

## 2022-05-19 NOTE — ASSESSMENT & PLAN NOTE
Suspect this is likely secondary to cirrhosis  Today worse than prior  Possibly due to infected diabetic foot ulcer verses chronic worsening in the setting of cirrhosis as patient still reports alcohol consumption  Follow up with PCP at discharge

## 2022-05-19 NOTE — NURSING NOTE
Insulin teaching provided to patient  Patient verbalized understanding and has no questions at this time  Patient given diabetes education pamphlet to take home

## 2022-05-22 LAB
BACTERIA BLD CULT: NORMAL
BACTERIA BLD CULT: NORMAL

## 2022-05-24 ENCOUNTER — TELEPHONE (OUTPATIENT)
Dept: OBGYN CLINIC | Facility: HOSPITAL | Age: 67
End: 2022-05-24

## 2022-05-24 NOTE — TELEPHONE ENCOUNTER
Patient sees Dr Hanane Almeida  Patient was seen in the ED on 5/17 for his left foot infection and was advised to follow up in the office in 1 week from then  He is wanting to be seen in the [place] office this week- Thursday or Friday and since the Dr does not have anything available then forwarded info to practice admin/ SME to assist further in getting patient an appointment        Call back# [phone]

## 2022-06-02 ENCOUNTER — OFFICE VISIT (OUTPATIENT)
Dept: WOUND CARE | Facility: HOSPITAL | Age: 67
End: 2022-06-02
Payer: MEDICARE

## 2022-06-02 VITALS
BODY MASS INDEX: 35 KG/M2 | HEART RATE: 71 BPM | HEIGHT: 71 IN | TEMPERATURE: 97.1 F | DIASTOLIC BLOOD PRESSURE: 69 MMHG | RESPIRATION RATE: 16 BRPM | SYSTOLIC BLOOD PRESSURE: 130 MMHG | WEIGHT: 250 LBS

## 2022-06-02 DIAGNOSIS — L97.522 ULCER OF LEFT FOOT, WITH FAT LAYER EXPOSED (HCC): Primary | ICD-10-CM

## 2022-06-02 DIAGNOSIS — E11.40 TYPE 2 DIABETES MELLITUS WITH DIABETIC NEUROPATHY, UNSPECIFIED WHETHER LONG TERM INSULIN USE (HCC): ICD-10-CM

## 2022-06-02 PROCEDURE — 99213 OFFICE O/P EST LOW 20 MIN: CPT | Performed by: PODIATRIST

## 2022-06-02 PROCEDURE — 11042 DBRDMT SUBQ TIS 1ST 20SQCM/<: CPT | Performed by: PODIATRIST

## 2022-06-02 RX ORDER — LIDOCAINE 40 MG/G
CREAM TOPICAL ONCE
Status: COMPLETED | OUTPATIENT
Start: 2022-06-02 | End: 2022-06-02

## 2022-06-02 RX ADMIN — LIDOCAINE: 40 CREAM TOPICAL at 08:51

## 2022-06-02 NOTE — PATIENT INSTRUCTIONS
Orders Placed This Encounter   Procedures    Wound cleansing and dressings     Left foot wound:    Do not get dressing wet, use cast cover or sponge bath    Wash your hands with soap and water  Remove old dressing, discard into plastic bag and place in trash  Cleanse the wound with normal saline or mild soap and water prior to applying a clean dressing  Do not use tissue or cotton balls  Do not scrub the wound  Pat dry using gauze  Apply silver alginate to wound  Cover with gauze  Secure with manda wrap and tape   Change dressing 3 times a week and as needed for drainage  Above was performed at wound care center today     Standing Status:   Future     Standing Expiration Date:   6/2/2023    Wound off loading     Off-loading Instructions:    Keep weight and pressure off wound at all times  Wear off-loading device as directed by your physician (darco wedge shoe)  Put on immediately when rising in the morning and remove when going to bed       Standing Status:   Future     Standing Expiration Date:   6/2/2023

## 2022-06-02 NOTE — PROGRESS NOTES
Patient ID: Iain Tejeda is a 74 y o  male Date of Birth 1955     Chief Complaint  Chief Complaint   Patient presents with    New Patient Visit     Left foot wound        Allergies  Sulfa antibiotics    Assessment:    No problem-specific Assessment & Plan notes found for this encounter  Diagnoses and all orders for this visit:    Ulcer of left foot, with fat layer exposed ([address] )  -     lidocaine (LMX) 4 % cream  -     Wound cleansing and dressings; Future  -     Wound off loading; Future    Type 2 diabetes mellitus with diabetic neuropathy, unspecified whether long term insulin use (HCC)  -     lidocaine (LMX) 4 % cream  -     Wound cleansing and dressings; Future  -     Wound off loading; Future    Other orders  -     Debridement              Debridement   Wound 06/02/22 Diabetic Ulcer Foot Left;Plantar    Universal Protocol:  Consent: Verbal consent obtained  Risks and benefits: risks, benefits and alternatives were discussed  Consent given by: patient  Time out: Immediately prior to procedure a "time out" was called to verify the correct patient, procedure, equipment, support staff and site/side marked as required    Timeout called at: 6/2/2022 8:44 AM   Patient understanding: patient states understanding of the procedure being performed  Patient identity confirmed: verbally with patient      Performed by: physician  Debridement type: surgical  Level of debridement: subcutaneous tissue  Pain control: lidocaine 4%  Post-debridement measurements  Length (cm): 1  Width (cm): 0 6  Depth (cm): 0 3  Percent debrided: 100%  Surface Area (cm^2): 0 6  Area debrided (cm^2): 0 6  Volume (cm^3): 0 18  Tissue and other material debrided: dermis, epidermis and subcutaneous tissue  Devitalized tissue debrided: callus, fibrin and slough  Instrument(s) utilized: blade  Bleeding: small  Hemostasis obtained with: pressure  Procedural pain (0-10): 0  Post-procedural pain: 0   Response to treatment: procedure was tolerated well          Plan:  1  Reviewed podiatry consultation from previous admission  Reviewed recent blood work  Patient was counseled and educated on the condition and the diagnosis  2  Treat him with serial debridement and orthowedge shoe for off-loading  3  Possible total contact cast    4   I am to be called if any signs of infection develop such as erythema, increased wound size or significant change in appearance of wound, odor, pain, increased or change in color of drainage, swelling, fever, chills, nightsweats  5  RA in 1 week  Wound 06/02/22 Diabetic Ulcer Foot Left;Plantar (Active)   Wound Image Images linked 06/02/22 0853   Wound Description Granulation tissue; Yellow 06/02/22 0838   Jenn-wound Assessment Callus; Maceration 06/02/22 0838   Wound Length (cm) 0 8 cm 06/02/22 0838   Wound Width (cm) 0 2 cm 06/02/22 0838   Wound Depth (cm) 0 3 cm 06/02/22 0838   Wound Surface Area (cm^2) 0 16 cm^2 06/02/22 0838   Wound Volume (cm^3) 0 048 cm^3 06/02/22 0838   Calculated Wound Volume (cm^3) 0 05 cm^3 06/02/22 0838   Undermining 0 2 06/02/22 0838   Undermining is depth extending from 1-6 oclock 06/02/22 0838   Drainage Amount Moderate 06/02/22 0838   Drainage Description Serosanguineous; Bloody 06/02/22 0838   Non-staged Wound Description Full thickness 06/02/22 0838       Wound 06/02/22 Diabetic Ulcer Foot Left;Plantar (Active)   Date First Assessed/Time First Assessed: 06/02/22 0832   Primary Wound Type: Diabetic Ulcer  Location: Foot  Wound Location Orientation: Left;Plantar  Wound Description (Comments): zhong 1       Subjective:        HPI  Fuad Board presents with his wife for left foot ulcer  He developed left foot ulcer with infection  He was treated with IV antibiotics at [facility] 2 weeks ago  Redness resolved  He has been using dermagren gauze  He did not obtain DM shoes  He did not follow-up in my office after discharged from wound center         The following portions of the patient's history were reviewed and updated as appropriate: allergies, current medications, past family history, past medical history, past social history, past surgical history and problem list     Review of Systems   Constitutional: Negative for chills and fever  Respiratory: Negative for shortness of breath  Cardiovascular: Negative for chest pain  Gastrointestinal: Negative for diarrhea, nausea and vomiting  Skin: Positive for wound  Objective:       Wound 06/02/22 Diabetic Ulcer Foot Left;Plantar (Active)   Wound Image Images linked 06/02/22 0853   Wound Description Granulation tissue; Yellow 06/02/22 0838   Jenn-wound Assessment Callus; Maceration 06/02/22 0838   Wound Length (cm) 0 8 cm 06/02/22 0838   Wound Width (cm) 0 2 cm 06/02/22 0838   Wound Depth (cm) 0 3 cm 06/02/22 0838   Wound Surface Area (cm^2) 0 16 cm^2 06/02/22 0838   Wound Volume (cm^3) 0 048 cm^3 06/02/22 0838   Calculated Wound Volume (cm^3) 0 05 cm^3 06/02/22 0838   Undermining 0 2 06/02/22 0838   Undermining is depth extending from 1-6 oclock 06/02/22 0838   Drainage Amount Moderate 06/02/22 0838   Drainage Description Serosanguineous; Bloody 06/02/22 0838   Non-staged Wound Description Full thickness 06/02/22 0838       /69   Pulse 71   Temp (!) 97 1 °F (36 2 °C)   Resp 16   Ht 5' 11" (1 803 m)   Wt 113 kg (250 lb)   BMI 34 87 kg/m²     Physical Exam  Vitals and nursing note reviewed  Constitutional:       General: He is not in acute distress  Appearance: Normal appearance  He is not ill-appearing or toxic-appearing  Cardiovascular:      Rate and Rhythm: Normal rate and regular rhythm  Pulses: Normal pulses  Pulmonary:      Effort: Pulmonary effort is normal  No respiratory distress  Musculoskeletal:         General: Deformity present  No tenderness or signs of injury  Skin:     General: Skin is warm  Coloration: Skin is not cyanotic or mottled  Findings: Wound present   No abscess, erythema or rash  Nails: There is no clubbing  Comments: Ulcer presents left submet 2  Infection resolved  Periwound callus / maceration noted  No redness or purulence  No deep probing  See wound assessments  Neurological:      General: No focal deficit present  Mental Status: He is alert and oriented to person, place, and time  Cranial Nerves: No cranial nerve deficit  Sensory: Sensory deficit present  Coordination: Coordination normal    Psychiatric:         Mood and Affect: Mood normal          Behavior: Behavior normal          Thought Content: Thought content normal          Judgment: Judgment normal            Wound Instructions:  Orders Placed This Encounter   Procedures    Wound cleansing and dressings     Left foot wound:    Do not get dressing wet, use cast cover or sponge bath    Wash your hands with soap and water  Remove old dressing, discard into plastic bag and place in trash  Cleanse the wound with normal saline or mild soap and water prior to applying a clean dressing  Do not use tissue or cotton balls  Do not scrub the wound  Pat dry using gauze  Apply silver alginate to wound  Cover with gauze  Secure with manda wrap and tape   Change dressing 3 times a week and as needed for drainage  Above was performed at wound care center today     Standing Status:   Future     Standing Expiration Date:   6/2/2023    Wound off loading     Off-loading Instructions:    Keep weight and pressure off wound at all times  Wear off-loading device as directed by your physician (darco wedge shoe)  Put on immediately when rising in the morning and remove when going to bed  Standing Status:   Future     Standing Expiration Date:   6/2/2023    Debridement     This order was created via procedure documentation        Diagnosis ICD-10-CM Associated Orders   1   [facility] left [facility] with [facility] )  L94 522 lidocaine (LMX) 4 % cream     Wound cleansing and dressings     Wound off loading   2   Type 2 diabetes mellitus with diabetic neuropathy, unspecified whether long term insulin use (HCC)  E11 40 lidocaine (LMX) 4 % cream     Wound cleansing and dressings     Wound off loading

## 2022-06-09 ENCOUNTER — OFFICE VISIT (OUTPATIENT)
Dept: WOUND CARE | Facility: HOSPITAL | Age: 67
End: 2022-06-09
Payer: MEDICARE

## 2022-06-09 VITALS
SYSTOLIC BLOOD PRESSURE: 132 MMHG | TEMPERATURE: 97.3 F | HEART RATE: 62 BPM | RESPIRATION RATE: 16 BRPM | DIASTOLIC BLOOD PRESSURE: 63 MMHG

## 2022-06-09 DIAGNOSIS — E11.40 TYPE 2 DIABETES MELLITUS WITH DIABETIC NEUROPATHY, UNSPECIFIED WHETHER LONG TERM INSULIN USE (HCC): ICD-10-CM

## 2022-06-09 DIAGNOSIS — L97.522 ULCER OF LEFT FOOT, WITH FAT LAYER EXPOSED (HCC): Primary | ICD-10-CM

## 2022-06-09 PROCEDURE — 11042 DBRDMT SUBQ TIS 1ST 20SQCM/<: CPT | Performed by: PODIATRIST

## 2022-06-09 RX ORDER — LIDOCAINE 40 MG/G
CREAM TOPICAL ONCE
Status: COMPLETED | OUTPATIENT
Start: 2022-06-09 | End: 2022-06-09

## 2022-06-09 RX ADMIN — LIDOCAINE 1 APPLICATION: 40 CREAM TOPICAL at 08:28

## 2022-06-09 NOTE — PROGRESS NOTES
Patient ID: Desi Johnson is a 74 y o  male Date of Birth 1955     Chief Complaint  Chief Complaint   Patient presents with    Follow Up Wound Care Visit     Left Foot wound       Allergies  Sulfa antibiotics    Assessment:    No problem-specific Assessment & Plan notes found for this encounter  Diagnoses and all orders for this visit:    Ulcer of left foot, with fat layer exposed ([facility] )  -     lidocaine (LMX) 4 % cream  -     Wound cleansing and dressings; Future  -     Wound off loading; Future  -     Wound miscellaneous orders; Future  -     Debridement    Type 2 diabetes mellitus with diabetic neuropathy, unspecified whether long term insulin use ([facility] )  -     Debridement              Debridement   Wound 06/02/22 Diabetic Ulcer Foot Left;Plantar    Universal Protocol:  Consent: Verbal consent obtained  Risks and benefits: risks, benefits and alternatives were discussed  Consent given by: patient  Time out: Immediately prior to procedure a "time out" was called to verify the correct patient, procedure, equipment, support staff and site/side marked as required  Timeout called at: 6/9/2022 8:49 AM   Patient understanding: patient states understanding of the procedure being performed  Patient identity confirmed: verbally with patient      Performed by: physician  Debridement type: surgical  Level of debridement: subcutaneous tissue  Pain control: lidocaine 4%  Post-debridement measurements  Length (cm): 0 6  Width (cm): 0 3  Depth (cm): 0 2  Percent debrided: 100%  Surface Area (cm^2): 0 18  Area debrided (cm^2): 0 18  Volume (cm^3): 0 04  Tissue and other material debrided: dermis, epidermis and subcutaneous tissue  Devitalized tissue debrided: callus, fibrin and slough  Instrument(s) utilized: blade  Bleeding: small  Hemostasis obtained with: pressure  Procedural pain (0-10): 0  Post-procedural pain: 0   Response to treatment: procedure was tolerated well          Plan:  1   Continue serial debridement and orthowedge shoe for off-loading  2  Possible total contact cast    3   Pt to call the wound center  if any signs of infection develop such as erythema, increased wound size or significant change in appearance of wound, odor, pain, increased or change in color of drainage, swelling, fever, chills, nightsweats  4  RA in 3 weeks  Wound 06/02/22 Diabetic Ulcer Foot Left;Plantar (Active)   Wound Image Images linked 06/09/22 0848   Wound Description Granulation tissue 06/09/22 0822   Jenn-wound Assessment Callus 06/09/22 0822   Wound Length (cm) 0 5 cm 06/09/22 0822   Wound Width (cm) 0 1 cm 06/09/22 0716   Wound Depth (cm) 0 2 cm 06/09/22 0822   Wound Surface Area (cm^2) 0 05 cm^2 06/09/22 0822   Wound Volume (cm^3) 0 01 cm^3 06/09/22 0822   Calculated Wound Volume (cm^3) 0 01 cm^3 06/09/22 0822   Change in Wound Size % 80 06/09/22 0822   Drainage Amount Small 06/09/22 0822   Drainage Description Serosanguineous 06/09/22 0822   Non-staged Wound Description Full thickness 06/09/22 0822   Dressing Status Intact 06/09/22 0822       Wound 06/02/22 Diabetic Ulcer Foot Left;Plantar (Active)   Date First Assessed/Time First Assessed: 06/02/22 0832   Primary Wound Type: Diabetic Ulcer  Location: Foot  Wound Location Orientation: Left;Plantar  Wound Description (Comments): zhong 1       Subjective:        HPI  Dionicio Celaya presents with his wife for left foot ulcer  No pain  No redness or edema  He feels well  He is not compliant about staying off of his feet  The following portions of the patient's history were reviewed and updated as appropriate: allergies, current medications, past family history, past medical history, past social history, past surgical history and problem list     Review of Systems   Constitutional: Negative for chills and fever  Respiratory: Negative for shortness of breath  Cardiovascular: Negative for chest pain  Gastrointestinal: Negative for diarrhea, nausea and vomiting  Skin: Positive for wound  Objective:       Wound 06/02/22 Diabetic Ulcer Foot Left;Plantar (Active)   Wound Image Images linked 06/09/22 0848   Wound Description Granulation tissue 06/09/22 0822   Jenn-wound Assessment Callus 06/09/22 0822   Wound Length (cm) 0 5 cm 06/09/22 0822   Wound Width (cm) 0 1 cm 06/09/22 0822   Wound Depth (cm) 0 2 cm 06/09/22 0822   Wound Surface Area (cm^2) 0 05 cm^2 06/09/22 0822   Wound Volume (cm^3) 0 01 cm^3 06/09/22 0822   Calculated Wound Volume (cm^3) 0 01 cm^3 06/09/22 0822   Change in Wound Size % 80 06/09/22 0822   Drainage Amount Small 06/09/22 0822   Drainage Description Serosanguineous 06/09/22 0822   Non-staged Wound Description Full thickness 06/09/22 0822   Dressing Status Intact 06/09/22 0822       /63   Pulse 62   Temp (!) 97 3 °F (36 3 °C)   Resp 16     Physical Exam  Vitals and nursing note reviewed  Constitutional:       General: He is not in acute distress  Appearance: Normal appearance  He is not ill-appearing or toxic-appearing  Cardiovascular:      Rate and Rhythm: Normal rate and regular rhythm  Pulses: Normal pulses  Pulmonary:      Effort: Pulmonary effort is normal  No respiratory distress  Musculoskeletal:         General: Deformity present  No tenderness or signs of injury  Skin:     General: Skin is warm  Coloration: Skin is not cyanotic or mottled  Findings: Wound present  No abscess, erythema or rash  Nails: There is no clubbing  Comments: Ulcer presents left submet 2  Mild periwound callus noted  Maceration resolved  No redness or purulence  No deep probing  See wound assessments  Neurological:      General: No focal deficit present  Mental Status: He is alert and oriented to person, place, and time  Cranial Nerves: No cranial nerve deficit  Sensory: Sensory deficit present        Coordination: Coordination normal    Psychiatric:         Mood and Affect: Mood normal  Behavior: Behavior normal          Thought Content: Thought content normal          Judgment: Judgment normal            Wound Instructions:  Orders Placed This Encounter   Procedures    Wound cleansing and dressings     Left foot wound:     Do not get dressing wet, use cast cover or sponge bath     Wash your hands with soap and water  Cleanse the wound with normal saline or mild soap and water prior to applying a clean dressing  Do not scrub the wound  Pat dry using gauze            Apply Dermagran gauze cut to fit the wound  Cover with gauze  Secure with amnda wrap and tape   Change dressing 3 times a week and as needed for drainage  If celina wound becomes macerated then may go back to using the silver alginate      Above was performed at wound care center today           Standing Status:   Future     Standing Expiration Date:   6/9/2023    Wound off loading     Off-loading Instructions:     Keep weight and pressure off wound at all times  Wear off-loading device as directed by your physician (darco wedge shoe)  Put on immediately when rising in the morning and remove when going to bed  Standing Status:   Future     Standing Expiration Date:   6/9/2023    Wound miscellaneous orders     Supplies ordered with [employer]---#[phone]     Standing Status:   Future     Standing Expiration Date:   6/9/2023    Debridement     This order was created via procedure documentation        Diagnosis ICD-10-CM Associated Orders   1  Ulcer of left foot, with fat layer exposed ([facility] )  L97 522 lidocaine (LMX) 4 % cream     Wound cleansing and dressings     Wound off loading     Wound miscellaneous orders     Debridement   2   Type 2 diabetes mellitus with diabetic neuropathy, unspecified whether long term insulin use ([facility])  E11 40 Debridement

## 2022-06-09 NOTE — PATIENT INSTRUCTIONS
Orders Placed This Encounter   Procedures    Wound cleansing and dressings     Left foot wound:     Do not get dressing wet, use cast cover or sponge bath     Wash your hands with soap and water  Cleanse the wound with normal saline or mild soap and water prior to applying a clean dressing  Do not scrub the wound  Pat dry using gauze  Apply Dermagran gauze cut to fit the wound  Cover with gauze  Secure with manda wrap and tape   Change dressing 3 times a week and as needed for drainage  If celina wound becomes macerated then may go back to using the silver alginate      Above was performed at wound care center today           Standing Status:   Future     Standing Expiration Date:   6/9/2023    Wound off loading     Off-loading Instructions:     Keep weight and pressure off wound at all times  Wear off-loading device as directed by your physician (darco wedge shoe)  Put on immediately when rising in the morning and remove when going to bed       Standing Status:   Future     Standing Expiration Date:   6/9/2023    Wound miscellaneous orders     Supplies ordered with [employer]---#[phone]     Standing Status:   Future     Standing Expiration Date:   6/9/2023

## 2022-06-23 LAB — HBA1C MFR BLD HPLC: 7.3 %

## 2022-06-30 ENCOUNTER — OFFICE VISIT (OUTPATIENT)
Dept: WOUND CARE | Facility: HOSPITAL | Age: 67
End: 2022-06-30
Payer: MEDICARE

## 2022-06-30 VITALS
TEMPERATURE: 97.4 F | SYSTOLIC BLOOD PRESSURE: 126 MMHG | RESPIRATION RATE: 16 BRPM | HEART RATE: 71 BPM | DIASTOLIC BLOOD PRESSURE: 70 MMHG

## 2022-06-30 DIAGNOSIS — E11.40 TYPE 2 DIABETES MELLITUS WITH DIABETIC NEUROPATHY, UNSPECIFIED WHETHER LONG TERM INSULIN USE (HCC): ICD-10-CM

## 2022-06-30 DIAGNOSIS — L97.522 ULCER OF LEFT FOOT, WITH FAT LAYER EXPOSED (HCC): Primary | ICD-10-CM

## 2022-06-30 PROCEDURE — 97597 DBRDMT OPN WND 1ST 20 CM/<: CPT | Performed by: PODIATRIST

## 2022-06-30 NOTE — PROGRESS NOTES
Patient ID: Melchor Lamar is a 74 y o  male Date of Birth 1955     Chief Complaint  Chief Complaint   Patient presents with    Follow Up Wound Care Visit     Left foot        Allergies  Sulfa antibiotics    Assessment:    No problem-specific Assessment & Plan notes found for this encounter  Diagnoses and all orders for this visit:    Ulcer of left foot, with fat layer exposed ([facility] )  -     Wound cleansing and dressings; Future  -     Wound miscellaneous orders; Future  -     Diabetic Shoe Inserts  -     Diabetic Shoe  -     Debridement    Type 2 diabetes mellitus with diabetic neuropathy, unspecified whether long term insulin use ([facility] )  -     Diabetic Shoe Inserts  -     Diabetic Shoe  -     Debridement              Debridement   Wound 06/02/22 Diabetic Ulcer Foot Left;Plantar    Universal Protocol:  Consent: Verbal consent obtained  Risks and benefits: risks, benefits and alternatives were discussed  Consent given by: patient  Time out: Immediately prior to procedure a "time out" was called to verify the correct patient, procedure, equipment, support staff and site/side marked as required  Timeout called at: 6/30/2022 8:36 AM   Patient understanding: patient states understanding of the procedure being performed  Patient identity confirmed: verbally with patient      Performed by: physician  Debridement type: selective  Pain control: lidocaine 4%  Post-debridement measurements  Length (cm): 0 3  Width (cm): 0 2  Depth (cm): 0 1  Percent debrided: 100%  Surface Area (cm^2): 0 06  Area debrided (cm^2): 0 06  Volume (cm^3): 0 01  Devitalized tissue debrided: callus and slough  Instrument(s) utilized: blade  Bleeding: small  Hemostasis obtained with: pressure  Procedural pain (0-10): 0  Post-procedural pain: 0   Response to treatment: procedure was tolerated well          Plan:  1  Wound is maris well  Continue serial debridement and orthowedge shoe for off-loading     2  Pt to call the wound center if any signs of infection develop such as erythema, increased wound size or significant change in appearance of wound, odor, pain, increased or change in color of drainage, swelling, fever, chills, nightsweats  3  Sent him for diabetic shoes  4  RA in 2 weeks  Wound 06/02/22 Diabetic Ulcer Foot Left;Plantar (Active)   Wound Image Images linked 06/30/22 0829   Wound Description Other (Comment) (maroon) 06/30/22 0832   Jenn-wound Assessment Callus 06/30/22 0832   Wound Length (cm) 0 3 cm 06/30/22 0832   Wound Width (cm) 0 1 cm 06/30/22 0832   Wound Depth (cm) 0 cm 06/30/22 0832   Wound Surface Area (cm^2) 0 03 cm^2 06/30/22 0832   Wound Volume (cm^3) 0 cm^3 06/30/22 0832   Calculated Wound Volume (cm^3) 0 cm^3 06/30/22 0832   Change in Wound Size % 100 06/30/22 0832   Drainage Amount None 06/30/22 0832       Wound 06/02/22 Diabetic Ulcer Foot Left;Plantar (Active)   Date First Assessed/Time First Assessed: 06/02/22 0832   Primary Wound Type: Diabetic Ulcer  Location: Foot  Wound Location Orientation: Left;Plantar  Wound Description (Comments): zhong 1       Subjective:        HPI  Kathryn Tsang presents with his wife for left foot ulcer  No pain  No redness or edema  No drainage  He feels well  The following portions of the patient's history were reviewed and updated as appropriate: allergies, current medications, past family history, past medical history, past social history, past surgical history and problem list     Review of Systems   Constitutional: Negative for chills and fever  Respiratory: Negative for shortness of breath  Cardiovascular: Negative for chest pain  Gastrointestinal: Negative for diarrhea, nausea and vomiting  Skin: Positive for wound           Objective:       Wound 06/02/22 Diabetic Ulcer Foot Left;Plantar (Active)   Wound Image Images linked 06/30/22 0829   Wound Description Other (Comment) (maroon) 06/30/22 0832   Jenn-wound Assessment Callus 06/30/22 0832   Wound Length (cm) 0 3 cm 06/30/22 0832   Wound Width (cm) 0 1 cm 06/30/22 0832   Wound Depth (cm) 0 cm 06/30/22 0832   Wound Surface Area (cm^2) 0 03 cm^2 06/30/22 0832   Wound Volume (cm^3) 0 cm^3 06/30/22 0832   Calculated Wound Volume (cm^3) 0 cm^3 06/30/22 0832   Change in Wound Size % 100 06/30/22 0832   Drainage Amount None 06/30/22 0832       /70   Pulse 71   Temp (!) 97 4 °F (36 3 °C)   Resp 16     Physical Exam  Vitals and nursing note reviewed  Constitutional:       General: He is not in acute distress  Appearance: Normal appearance  He is not ill-appearing or toxic-appearing  Cardiovascular:      Rate and Rhythm: Normal rate and regular rhythm  Pulses: Normal pulses  Pulmonary:      Effort: Pulmonary effort is normal  No respiratory distress  Musculoskeletal:         General: Deformity present  No tenderness or signs of injury  Skin:     General: Skin is warm  Coloration: Skin is not cyanotic or mottled  Findings: Wound present  No abscess, erythema or rash  Nails: There is no clubbing  Comments: Ulcer presents left submet 2  Mild periwound callus noted  Wound is smaller with minimal depth  No redness or purulence  No deep probing  See wound assessments  Neurological:      General: No focal deficit present  Mental Status: He is alert and oriented to person, place, and time  Cranial Nerves: No cranial nerve deficit  Sensory: Sensory deficit present  Coordination: Coordination normal    Psychiatric:         Mood and Affect: Mood normal          Behavior: Behavior normal          Thought Content: Thought content normal          Judgment: Judgment normal            Wound Instructions:  Orders Placed This Encounter   Procedures    Wound cleansing and dressings     Left foot wound:     Do not get dressing wet, use cast cover or sponge bath     Wash your hands with soap and water        Cleanse the wound with normal saline or mild soap and water prior to applying a clean dressing  Do not scrub the wound  Pat dry using gauze            Apply Dermagran gauze cut to fit the wound  Cover with gauze  Secure with manda wrap and tape   Change dressing 3 times a week and as needed for drainage  This was performed at wound care center today           Above was performed at wound care center today                             Wound off loading      Off-loading Instructions:     Keep weight and pressure off wound at all times  Wear off-loading device as directed by your physician (darco wedge shoe)  Put on immediately when rising in the morning and remove when going to bed  Standing Status:   Future     Standing Expiration Date:   6/30/2023    Wound miscellaneous orders     Follow up with [id number] for diabetic shoes     Standing Status:   Future     Standing Expiration Date:   6/30/2023    Debridement     This order was created via procedure documentation    Diabetic Shoe Inserts     Order Specific Question:   Type     Answer:   [employer]    Diabetic Shoe     History of left submet 2 and left great toe ulcer  Diagnosis ICD-10-CM Associated Orders   1  Ulcer of left foot, with fat layer exposed ([facility] )  L94 521 Wound cleansing and dressings     Wound miscellaneous orders     Diabetic Shoe Inserts     Diabetic Shoe     Debridement   2   Type 2 diabetes mellitus with diabetic neuropathy, unspecified whether long term insulin use ([facility])  E11 40 Diabetic Shoe Inserts     Diabetic Shoe     Debridement

## 2022-06-30 NOTE — PATIENT INSTRUCTIONS
Orders Placed This Encounter   Procedures    Wound cleansing and dressings     Left foot wound:     Do not get dressing wet, use cast cover or sponge bath     Wash your hands with soap and water  Cleanse the wound with normal saline or mild soap and water prior to applying a clean dressing  Do not scrub the wound  Pat dry using gauze  Apply Dermagran gauze cut to fit the wound  Cover with gauze  Secure with manda wrap and tape   Change dressing 3 times a week and as needed for drainage  This was performed at wound care center today           Above was performed at wound care center today                             Wound off loading      Off-loading Instructions:     Keep weight and pressure off wound at all times  Wear off-loading device as directed by your physician (darco wedge shoe)  Put on immediately when rising in the morning and remove when going to bed       Standing Status:   Future     Standing Expiration Date:   6/30/2023    Wound miscellaneous orders     Follow up with [id number] [address] for diabetic shoes     Standing Status:   Future     Standing Expiration Date:   6/30/2023

## 2022-07-14 ENCOUNTER — OFFICE VISIT (OUTPATIENT)
Dept: WOUND CARE | Facility: HOSPITAL | Age: 67
End: 2022-07-14
Payer: MEDICARE

## 2022-07-14 VITALS
RESPIRATION RATE: 18 BRPM | DIASTOLIC BLOOD PRESSURE: 65 MMHG | HEART RATE: 83 BPM | SYSTOLIC BLOOD PRESSURE: 127 MMHG | TEMPERATURE: 98.2 F

## 2022-07-14 DIAGNOSIS — L97.522 ULCER OF LEFT FOOT, WITH FAT LAYER EXPOSED (HCC): Primary | ICD-10-CM

## 2022-07-14 DIAGNOSIS — E11.40 TYPE 2 DIABETES MELLITUS WITH DIABETIC NEUROPATHY, UNSPECIFIED WHETHER LONG TERM INSULIN USE (HCC): ICD-10-CM

## 2022-07-14 PROCEDURE — 99213 OFFICE O/P EST LOW 20 MIN: CPT | Performed by: PODIATRIST

## 2022-07-14 PROCEDURE — 99212 OFFICE O/P EST SF 10 MIN: CPT | Performed by: PODIATRIST

## 2022-07-14 RX ORDER — LIDOCAINE 40 MG/G
CREAM TOPICAL ONCE
Status: COMPLETED | OUTPATIENT
Start: 2022-07-14 | End: 2022-07-14

## 2022-07-14 RX ADMIN — LIDOCAINE: 40 CREAM TOPICAL at 08:24

## 2022-07-14 NOTE — PROGRESS NOTES
Patient ID: Kaur Parker is a 74 y o  male Date of Birth 1955     Chief Complaint  Chief Complaint   Patient presents with    Follow Up Wound Care Visit     Left foot wound       Allergies  Sulfa antibiotics    Assessment:    No problem-specific Assessment & Plan notes found for this encounter  Diagnoses and all orders for this visit:    Ul[facility] of left foot, with [facility] layer exposed ([facility] )  -     lidocaine (LMX) 4 % cream  -     Wound cleansing and dressings; Future  -     Wound off loading; Future  -     Wound miscellaneous orders; Future    Type 2 diabetes mellitus with diabetic neuropathy, unspecified whether long term insulin use ([facility] )              Procedures    Plan:  1  Reviewed previous wound care notes and photos  Wound is epithelizing well now  Continue local care and orthowedge shoe for off-loading  Possible TCC depending on the progress  2  Pt to call the wound center  if any signs of infection develop such as erythema, increased wound size or significant change in appearance of wound, odor, pain, increased or change in color of drainage, swelling, fever, chills, nightsweats  3  Concerning history of DFU on left great toe and submet 2, recommended diabetic shoes and inserts  Pt to obtain shoes / inserts from [address]  4  RA in 2 weeks           Wound 06/02/22 Diabetic Ulcer Foot Left;Plantar (Active)   Wound Image Images linked 07/14/22 0821   Wound Description Brown 07/14/22 0819   Jenn-wound Assessment Callus 07/14/22 0819   Wound Length (cm) 0 2 cm 07/14/22 0819   Wound Width (cm) 0 1 cm 07/14/22 0819   Wound Depth (cm) 0 cm 07/14/22 0819   Wound Surface Area (cm^2) 0 02 cm^2 07/14/22 0819   Wound Volume (cm^3) 0 cm^3 07/14/22 0819   Calculated Wound Volume (cm^3) 0 cm^3 07/14/22 0819   Change in Wound Size % 100 07/14/22 0819   Drainage Amount None 07/14/22 0819   Non-staged Wound Description Full thickness 07/14/22 0819   Dressing Status Intact (upon arrival) 07/14/22 1084 Wound 06/02/22 Diabetic Ulcer Foot Left;Plantar (Active)   Date First Assessed/Time First Assessed: 06/02/22 0832   Primary Wound Type: Diabetic Ulcer  Location: Foot  Wound Location Orientation: Left;Plantar  Wound Description (Comments): zhong 1       Subjective:        HPI  Deni Neighbor presents for left foot wound care  No pain  No redness or edema  No drainage  No new complaint  He presents with orthowedge shoe today  The following portions of the patient's history were reviewed and updated as appropriate: allergies, current medications, past family history, past medical history, past social history, past surgical history and problem list     Review of Systems   Constitutional: Negative for chills and fever  Respiratory: Negative for shortness of breath  Cardiovascular: Negative for chest pain  Gastrointestinal: Negative for diarrhea, nausea and vomiting  Skin: Positive for wound  Objective:       Wound 06/02/22 Diabetic Ulcer Foot Left;Plantar (Active)   Wound Image Images linked 07/14/22 0821   Wound Description Brown 07/14/22 0819   Jenn-wound Assessment Callus 07/14/22 0819   Wound Length (cm) 0 2 cm 07/14/22 0819   Wound Width (cm) 0 1 cm 07/14/22 0819   Wound Depth (cm) 0 cm 07/14/22 0819   Wound Surface Area (cm^2) 0 02 cm^2 07/14/22 0819   Wound Volume (cm^3) 0 cm^3 07/14/22 0819   Calculated Wound Volume (cm^3) 0 cm^3 07/14/22 0819   Change in Wound Size % 100 07/14/22 0819   Drainage Amount None 07/14/22 0819   Non-staged Wound Description Full thickness 07/14/22 0819   Dressing Status Intact (upon arrival) 07/14/22 0819       /65   Pulse 83   Temp 98 2 °F (36 8 °C)   Resp 18     Physical Exam  Vitals and nursing note reviewed  Constitutional:       General: He is not in acute distress  Appearance: Normal appearance  He is not ill-appearing or toxic-appearing  Cardiovascular:      Rate and Rhythm: Normal rate and regular rhythm        Pulses: Normal pulses  Pulmonary:      Effort: Pulmonary effort is normal  No respiratory distress  Musculoskeletal:         General: Deformity present  No tenderness or signs of injury  Skin:     General: Skin is warm  Coloration: Skin is not cyanotic or mottled  Findings: Wound present  No abscess, erythema or rash  Nails: There is no clubbing  Comments: Left foot wound is mostly epithelized with minimal depth  No drainage  No abscess  No redness or purulence  No deep probing  See wound assessments  Neurological:      General: No focal deficit present  Mental Status: He is alert and oriented to person, place, and time  Cranial Nerves: No cranial nerve deficit  Sensory: Sensory deficit present  Coordination: Coordination normal    Psychiatric:         Mood and Affect: Mood normal          Behavior: Behavior normal          Thought Content: Thought content normal          Judgment: Judgment normal            Wound Instructions:  Orders Placed This Encounter   Procedures    Wound cleansing and dressings     Left foot wound:     Do not get dressing wet, use cast cover or sponge bath     Wash your hands with soap and water  Cleanse the wound with normal saline or mild soap and water prior to applying a clean dressing  Do not scrub the wound  Pat dry using gauze            Apply Dermagran gauze cut to fit the wound  Cover with gauze  Secure with manda wrap and tape   Change dressing 3 times a week and as needed for drainage  This was performed at wound care center today            Above was performed at wound care center today                                Standing Status:   Future     Standing Expiration Date:   7/14/2023    Wound off loading                                 Wound off loading                          Off-loading Instructions:     Keep weight and pressure off wound at all times  Wear off-loading device as directed by your physician (darco wedge shoe)  Put on immediately when rising in the morning and remove when going to bed            Standing Status:   Future     Standing Expiration Date:   7/14/2023    Wound miscellaneous orders     Follow up with [id number] for diabetic shoes- has an appointment week of July 18th        Standing Status:   Future     Standing Expiration Date:   7/14/2023        Diagnosis ICD-10-CM Associated Orders   1  Ulcer of left foot, with fat layer exposed ([facility] )  L97 522 lidocaine (LMX) 4 % cream     Wound cleansing and dressings     Wound off loading     Wound miscellaneous orders   2   Type 2 diabetes mellitus with diabetic neuropathy, unspecified whether long term insulin use ([facility])  E11 40

## 2022-07-14 NOTE — PATIENT INSTRUCTIONS
Orders Placed This Encounter   Procedures    Wound cleansing and dressings     Left foot wound:     Do not get dressing wet, use cast cover or sponge bath     Wash your hands with soap and water  Cleanse the wound with normal saline or mild soap and water prior to applying a clean dressing  Do not scrub the wound  Pat dry using gauze  Apply Dermagran gauze cut to fit the wound  Cover with gauze  Secure with manda wrap and tape   Change dressing 3 times a week and as needed for drainage  This was performed at wound care center today            Above was performed at wound care center today                                Standing Status:   Future     Standing Expiration Date:   7/14/2023    Wound off loading                                 Wound off loading                          Off-loading Instructions:     Keep weight and pressure off wound at all times  Wear off-loading device as directed by your physician (darco wedge shoe)  Put on immediately when rising in the morning and remove when going to bed             Standing Status:   Future     Standing Expiration Date:   7/14/2023    Wound miscellaneous orders     Follow up with [address] [place] [address] for diabetic shoes- has an appointment week of July 18th        Standing Status:   Future     Standing Expiration Date:   7/14/2023

## 2022-07-26 DIAGNOSIS — E11.9 TYPE 2 DIABETES MELLITUS WITHOUT COMPLICATION, WITHOUT LONG-TERM CURRENT USE OF INSULIN (HCC): ICD-10-CM

## 2022-07-26 DIAGNOSIS — E11.628 DIABETIC FOOT INFECTION (HCC): ICD-10-CM

## 2022-07-26 DIAGNOSIS — L08.9 DIABETIC FOOT INFECTION (HCC): ICD-10-CM

## 2022-07-27 RX ORDER — PEN NEEDLE, DIABETIC 32GX 5/32"
NEEDLE, DISPOSABLE MISCELLANEOUS
Qty: 100 EACH | Refills: 0 | OUTPATIENT
Start: 2022-07-27

## 2022-07-27 NOTE — TELEPHONE ENCOUNTER
Clyde Yang is not a patient of the [facility] office  No longer under the care of Dr Lewis Orn   Will refuse refill request  Uli Judge is a 67 year old male presenting with lower back pain and feels a burning sensation for 2 weeks.  -no recent falls, injuries or accidents  -not taking otc medication  -had new medication prescribed medication.  -spouse stated he started taking the prostate pill yesterday and today he feels his back burning    Patient swabbed for Covid PCR No  Patient swabbed for Strep No  Patient swabbed for Flu No      Patient is accompanied by spouse Thuy Judge       Vitals:    02/18/22 1156   BP: (!) 174/64   Pulse: 83   Resp: 16   Temp: 98.3 °F (36.8 °C)           [facility]/pharmacy #[id number] - [place], [place] - [address]  [address] [address] [facility] [address]  Phone: [phone] Fax: [phone]       Denies known Latex allergy or symptoms of Latex sensitivity.    Patient would like communication of their results via:        Cell Phone:   Telephone Information:   Mobile [phone]   Home     [phone]    Okay to leave a message containing results? Yes      Patient, provider and nursing staff wearing appropriate PPE including masks during office visit today.

## 2022-07-28 ENCOUNTER — OFFICE VISIT (OUTPATIENT)
Dept: WOUND CARE | Facility: HOSPITAL | Age: 67
End: 2022-07-28
Payer: MEDICARE

## 2022-07-28 VITALS
SYSTOLIC BLOOD PRESSURE: 134 MMHG | DIASTOLIC BLOOD PRESSURE: 78 MMHG | TEMPERATURE: 98 F | HEART RATE: 87 BPM | RESPIRATION RATE: 18 BRPM

## 2022-07-28 DIAGNOSIS — E11.40 TYPE 2 DIABETES MELLITUS WITH DIABETIC NEUROPATHY, UNSPECIFIED WHETHER LONG TERM INSULIN USE (HCC): ICD-10-CM

## 2022-07-28 DIAGNOSIS — L97.522 ULCER OF LEFT FOOT, WITH FAT LAYER EXPOSED (HCC): Primary | ICD-10-CM

## 2022-07-28 PROCEDURE — 17110 DESTRUCTION B9 LES UP TO 14: CPT | Performed by: PODIATRIST

## 2022-07-28 PROCEDURE — 97597 DBRDMT OPN WND 1ST 20 CM/<: CPT | Performed by: PODIATRIST

## 2022-07-28 PROCEDURE — 99213 OFFICE O/P EST LOW 20 MIN: CPT | Performed by: PODIATRIST

## 2022-07-28 PROCEDURE — 99212 OFFICE O/P EST SF 10 MIN: CPT | Performed by: PODIATRIST

## 2022-07-28 PROCEDURE — 11055 PARING/CUTG B9 HYPRKER LES 1: CPT | Performed by: PODIATRIST

## 2022-07-28 RX ORDER — LIDOCAINE 40 MG/G
CREAM TOPICAL ONCE
Status: COMPLETED | OUTPATIENT
Start: 2022-07-28 | End: 2022-07-28

## 2022-07-28 RX ADMIN — LIDOCAINE 1 APPLICATION: 40 CREAM TOPICAL at 09:11

## 2022-07-28 NOTE — PATIENT INSTRUCTIONS
Orders Placed This Encounter   Procedures    Wound cleansing and dressings     Left foot wound is healed  Moisturize area daily  may apply bandaid daily for 1-2 weeks for protection  (allevyn lite applied today in wound care    Follow up with podiatry in 4 weeks for routine care  Wear diabetic shoes when you receive them  Discharge from outpatient wound care                                       Standing Status:   Future     Standing Expiration Date:   7/28/2023

## 2022-07-28 NOTE — PROGRESS NOTES
Patient ID: Cynthia Capellan is a 74 y o  male Date of Birth 1955     Chief Complaint  Chief Complaint   Patient presents with    Follow Up Wound Care Visit     Left foot wound       Allergies  Sulfa antibiotics    Assessment:    No problem-specific Assessment & Plan notes found for this encounter  Diagnoses and all orders for this visit:    Ulcer of left foot, with fat layer exposed ([address] )  -     lidocaine (LMX) 4 % cream  -     Wound cleansing and dressings; Future    Type 2 diabetes mellitus with diabetic neuropathy, unspecified whether long term insulin use ([facility])  -     lidocaine (LMX) 4 % cream  -     Wound cleansing and dressings; Future                Plan:    - The wound site was debrided of callused tissue with a sterile #15 blade  There is no underlying ulcer, the wound site is completely re-epithelialized  Recommend DSD daily for another 1-2 weeks to protect the healed wound site  - He has new diabetic shoes pending   - Recommend follow up with Dr Terrie Aguilar in 4 weeks for re-check and callus debridement in office setting       Wound 06/02/22 Diabetic Ulcer Foot Left;Plantar (Active)   Wound Image Images linked 07/28/22 0922   Wound Description Epithelialization;Dry 07/28/22 0901   Jenn-wound Assessment Callus 07/28/22 0901   Wound Length (cm) 0 cm 07/28/22 0901   Wound Width (cm) 0 cm 07/28/22 0901   Wound Depth (cm) 0 cm 07/28/22 0901   Wound Surface Area (cm^2) 0 cm^2 07/28/22 0901   Wound Volume (cm^3) 0 cm^3 07/28/22 0901   Calculated Wound Volume (cm^3) 0 cm^3 07/28/22 0901   Change in Wound Size % 100 07/28/22 0901   Drainage Amount None 07/28/22 0901   Non-staged Wound Description Not applicable 57/91/49 8782   Dressing Status Intact 07/28/22 0901       Wound 06/02/22 Diabetic Ulcer Foot Left;Plantar (Active)   Date First Assessed/Time First Assessed: 06/02/22 0832   Primary Wound Type: Diabetic Ulcer  Location: Foot  Wound Location Orientation: Left;Plantar  Wound Description (Comments): trevin 1  Wound Outcome: Healed       Subjective:        Patient presents today for follow up of a left plantar forefoot ulcer  He offers no new issues nor complaints  He has been following wound care orders as directed  The following portions of the patient's history were reviewed and updated as appropriate: allergies, current medications, past family history, past medical history, past social history, past surgical history and problem list     Review of Systems   Constitutional: Negative  HENT: Negative  Eyes: Negative  Respiratory: Negative  Cardiovascular: Negative  Endocrine: Negative  Musculoskeletal: Negative  Neurological: Negative  Hematological: Negative  Psychiatric/Behavioral: Negative  PAST MEDICAL HISTORY:  Past Medical History:   Diagnosis Date    Arrhythmia     Diabetes mellitus ([address] )     History of echocardiogram 11/14/2017    showed EF of 50-55 percentWith moderate LVH and left ventricle diastolic dysfunction  Left atrium was moderately enlarged  Trace MR noted   History of Holter monitoring 11/21/2017    showed baseline rhythm of sinus origin with an average heart it of 61 bpm  The lowest heart rate was 49 and the highest heart rate was 10 8 bpm  There were rare single VPCs, and frequent PACs representing 3 2% of total beats  There were several episodes of sinus arrhythmias with sinus bradycardia and heart rate ranging from 40-90 bpm  No sustained dysrhythmias, or pauses noted   The patient did not    Hypertension     Obese        PAST SURGICAL HISTORY:  Past Surgical History:   Procedure Laterality Date    EYE SURGERY Left 1997    HERNIA REPAIR  [id number]    KNEE ARTHROPLASTY Right 2008    SHOULDER SURGERY Right 2002        ALLERGIES:  Sulfa antibiotics    MEDICATIONS:  Current Outpatient Medications   Medication Sig Dispense Refill    cholecalciferol (VITAMIN D3) 1,000 units tablet Take 2 tablets (2,000 Units total) by mouth daily 60 tablet 0  diltiazem (CARDIZEM CD) 180 mg 24 hr capsule       folic acid (FOLVITE) 1 mg tablet Take 1 tablet (1 mg total) by mouth daily 30 tablet 0    insulin glargine (Lantus SoloStar) 100 units/mL injection pen Inject 30 Units under the skin daily at bedtime (Patient taking differently: Inject 40 Units under the skin daily at bedtime) 15 mL 0    Insulin Pen Needle (BD Pen Needle Nayla 2nd Gen) 32G X 4 MM MISC For use with insulin pen  Pharmacy may dispense brand covered by insurance  100 each 0    loratadine (CLARITIN) 10 mg tablet Take 1 tablet (10 mg total) by mouth daily 30 tablet 0    losartan (COZAAR) 50 mg tablet       Multiple Vitamin (multivitamin) capsule Take 1 capsule by mouth daily 21 capsule 0    pantoprazole (PROTONIX) 40 mg tablet Take 1 tablet (40 mg total) by mouth daily On an empty stomatch, at least 30 mins before breakfast 30 tablet 0    thiamine 100 MG tablet Take 1 tablet (100 mg total) by mouth daily 30 tablet 0    triamterene-hydrochlorothiazide (MAXZIDE-25) 37 5-25 mg per tablet Take 1 tablet by mouth daily       No current facility-administered medications for this visit         SOCIAL HISTORY:  Social History     Socioeconomic History    Marital status: /Civil Union     Spouse name: None    Number of children: 2    Years of education: None    Highest education level: None   Occupational History    None   Tobacco Use    Smoking status: Former Smoker     Packs/day: 0 50     Years: 20 00     Pack years: 10 00     Types: Cigarettes     Quit date:      Years since quittin 5    Smokeless tobacco: Never Used   Vaping Use    Vaping Use: Never used   Substance and Sexual Activity    Alcohol use: Not Currently     Comment: quit     Drug use: Never    Sexual activity: None   Other Topics Concern    None   Social History Narrative    · Most recent tobacco use screenin2018      · Do you currently or have you served in the [employer]:   No      · Live alone or with others:   with others      · High blood pressure: Yes      · Exercise level:   Occasional      · Overweight:   Yes      · Obese: Yes      · Diabetes:   Yes      Social Determinants of Health     Financial Resource Strain: Not on file   Food Insecurity: Not on file   Transportation Needs: Not on file   Physical Activity: Not on file   Stress: Not on file   Social Connections: Not on file   Intimate Partner Violence: Not on file   Housing Stability: Not on file        Objective:       Wound 06/02/22 Diabetic Ulcer Foot Left;Plantar (Active)   Wound Image Images linked 07/28/22 0922   Wound Description Epithelialization;Dry 07/28/22 0901   Jenn-wound Assessment Callus 07/28/22 0901   Wound Length (cm) 0 cm 07/28/22 0901   Wound Width (cm) 0 cm 07/28/22 0901   Wound Depth (cm) 0 cm 07/28/22 0901   Wound Surface Area (cm^2) 0 cm^2 07/28/22 0901   Wound Volume (cm^3) 0 cm^3 07/28/22 0901   Calculated Wound Volume (cm^3) 0 cm^3 07/28/22 0901   Change in Wound Size % 100 07/28/22 0901   Drainage Amount None 07/28/22 0901   Non-staged Wound Description Not applicable 48/32/98 0462   Dressing Status Intact 07/28/22 0901       /78   Pulse 87   Temp 98 °F (36 7 °C)   Resp 18     Physical Exam  Vitals reviewed  Constitutional:       Appearance: Normal appearance  HENT:      Head: Normocephalic and atraumatic  Nose: Nose normal    Pulmonary:      Effort: Pulmonary effort is normal    Musculoskeletal:        Feet:    Feet:      Comments: (+) healed plantar left forefoot ulcer site with callus formation; no signs of infection noted  Skin:     General: Skin is warm  Capillary Refill: Capillary refill takes less than 2 seconds  Neurological:      General: No focal deficit present  Mental Status: He is alert and oriented to person, place, and time     Psychiatric:         Mood and Affect: Mood normal          Behavior: Behavior normal            Wound Instructions:  Orders Placed This Encounter   Procedures    Wound cleansing and dressings     Left foot wound is healed  Moisturize area daily  may apply bandaid daily for 1-2 weeks for protection  (allevyn lite applied today in wound care    Follow up with podiatry in 4 weeks for routine care  Wear diabetic shoes when you receive them  Discharge from outpatient wound care                                      Standing Status:   Future     Standing Expiration Date:   7/28/2023        Diagnosis ICD-10-CM Associated Orders   1  Ulcer of left foot, with fat layer exposed ([facility] )  L97 522 lidocaine (LMX) 4 % cream     Wound cleansing and dressings   2   Type 2 diabetes mellitus with diabetic neuropathy, unspecified whether long term insulin use ([facility])  E11 40 lidocaine (LMX) 4 % cream     Wound cleansing and dressings

## 2022-08-04 NOTE — PROGRESS NOTES
Lesion Destruction    Date/Time: 7/28/2022 9:25 AM  Performed by: Bret Sims DPM  Authorized by: Bret Sims DPM   Universal Protocol:  Consent: Verbal consent obtained  Risks and benefits: risks, benefits and alternatives were discussed  Consent given by: patient  Time out: Immediately prior to procedure a "time out" was called to verify the correct patient, procedure, equipment, support staff and site/side marked as required  Timeout called at: 7/28/2022 9:25 AM   Patient understanding: patient states understanding of the procedure being performed  Patient consent: the patient's understanding of the procedure matches consent given  Patient identity confirmed: verbally with patient and provided demographic data      Procedure Details - Lesion Destruction:     Number of Lesions:  1  Lesion 1:     Body area:  Lower extremity    Lower extremity location:  L foot    Malignancy: benign lesion      Destruction method: scissors used for extraction       A callused pre-trophic lesion was formed over the previous wound site  It was debrided without complication, the underlying wound is closed

## 2022-09-12 ENCOUNTER — TELEPHONE (OUTPATIENT)
Dept: PODIATRY | Facility: CLINIC | Age: 67
End: 2022-09-12

## 2022-09-13 ENCOUNTER — HOSPITAL ENCOUNTER (INPATIENT)
Facility: HOSPITAL | Age: 67
LOS: 2 days | Discharge: HOME | End: 2022-09-16
Attending: EMERGENCY MEDICINE | Admitting: INTERNAL MEDICINE
Payer: MEDICARE

## 2022-09-13 ENCOUNTER — APPOINTMENT (OUTPATIENT)
Dept: RADIOLOGY | Facility: HOSPITAL | Age: 67
End: 2022-09-13
Payer: MEDICARE

## 2022-09-13 DIAGNOSIS — N17.9 AKI (ACUTE KIDNEY INJURY) (HCC): ICD-10-CM

## 2022-09-13 DIAGNOSIS — E11.9 TYPE 2 DIABETES MELLITUS WITHOUT COMPLICATION, WITHOUT LONG-TERM CURRENT USE OF INSULIN (HCC): ICD-10-CM

## 2022-09-13 DIAGNOSIS — L03.90 CELLULITIS: ICD-10-CM

## 2022-09-13 DIAGNOSIS — L08.9 DIABETIC FOOT INFECTION (HCC): Primary | ICD-10-CM

## 2022-09-13 DIAGNOSIS — E11.628 DIABETIC FOOT INFECTION (HCC): Primary | ICD-10-CM

## 2022-09-13 LAB
ALBUMIN SERPL BCP-MCNC: 4 G/DL (ref 3.5–5)
ALP SERPL-CCNC: 127 U/L (ref 34–104)
ALT SERPL W P-5'-P-CCNC: 45 U/L (ref 7–52)
ANION GAP SERPL CALCULATED.3IONS-SCNC: 9 MMOL/L (ref 4–13)
AST SERPL W P-5'-P-CCNC: 39 U/L (ref 13–39)
BASOPHILS # BLD AUTO: 0.02 THOUSANDS/ΜL (ref 0–0.1)
BASOPHILS NFR BLD AUTO: 1 % (ref 0–1)
BILIRUB SERPL-MCNC: 1.01 MG/DL (ref 0.2–1)
BUN SERPL-MCNC: 41 MG/DL (ref 5–25)
CALCIUM SERPL-MCNC: 10.1 MG/DL (ref 8.4–10.2)
CHLORIDE SERPL-SCNC: 103 MMOL/L (ref 96–108)
CO2 SERPL-SCNC: 26 MMOL/L (ref 21–32)
CREAT SERPL-MCNC: 1.44 MG/DL (ref 0.6–1.3)
CRP SERPL QL: 18.1 MG/L
EOSINOPHIL # BLD AUTO: 0.13 THOUSAND/ΜL (ref 0–0.61)
EOSINOPHIL NFR BLD AUTO: 3 % (ref 0–6)
ERYTHROCYTE [DISTWIDTH] IN BLOOD BY AUTOMATED COUNT: 14 % (ref 11.6–15.1)
ERYTHROCYTE [SEDIMENTATION RATE] IN BLOOD: 21 MM/HOUR (ref 0–19)
GFR SERPL CREATININE-BSD FRML MDRD: 49 ML/MIN/1.73SQ M
GLUCOSE SERPL-MCNC: 167 MG/DL (ref 65–140)
HCT VFR BLD AUTO: 45.9 % (ref 36.5–49.3)
HGB BLD-MCNC: 15.1 G/DL (ref 12–17)
IMM GRANULOCYTES # BLD AUTO: 0.02 THOUSAND/UL (ref 0–0.2)
IMM GRANULOCYTES NFR BLD AUTO: 1 % (ref 0–2)
LACTATE SERPL-SCNC: 0.9 MMOL/L (ref 0.5–2)
LYMPHOCYTES # BLD AUTO: 0.67 THOUSANDS/ΜL (ref 0.6–4.47)
LYMPHOCYTES NFR BLD AUTO: 17 % (ref 14–44)
MCH RBC QN AUTO: 30.4 PG (ref 26.8–34.3)
MCHC RBC AUTO-ENTMCNC: 32.9 G/DL (ref 31.4–37.4)
MCV RBC AUTO: 92 FL (ref 82–98)
MONOCYTES # BLD AUTO: 0.38 THOUSAND/ΜL (ref 0.17–1.22)
MONOCYTES NFR BLD AUTO: 10 % (ref 4–12)
NEUTROPHILS # BLD AUTO: 2.8 THOUSANDS/ΜL (ref 1.85–7.62)
NEUTS SEG NFR BLD AUTO: 68 % (ref 43–75)
NRBC BLD AUTO-RTO: 0 /100 WBCS
PLATELET # BLD AUTO: 62 THOUSANDS/UL (ref 149–390)
PMV BLD AUTO: 13.2 FL (ref 8.9–12.7)
POTASSIUM SERPL-SCNC: 4.4 MMOL/L (ref 3.5–5.3)
PROT SERPL-MCNC: 7.4 G/DL (ref 6.4–8.4)
RBC # BLD AUTO: 4.97 MILLION/UL (ref 3.88–5.62)
SODIUM SERPL-SCNC: 138 MMOL/L (ref 135–147)
WBC # BLD AUTO: 4.02 THOUSAND/UL (ref 4.31–10.16)

## 2022-09-13 PROCEDURE — 85025 COMPLETE CBC W/AUTO DIFF WBC: CPT | Performed by: EMERGENCY MEDICINE

## 2022-09-13 PROCEDURE — 83605 ASSAY OF LACTIC ACID: CPT | Performed by: PHYSICIAN ASSISTANT

## 2022-09-13 PROCEDURE — 87040 BLOOD CULTURE FOR BACTERIA: CPT | Performed by: PHYSICIAN ASSISTANT

## 2022-09-13 PROCEDURE — 80053 COMPREHEN METABOLIC PANEL: CPT | Performed by: EMERGENCY MEDICINE

## 2022-09-13 PROCEDURE — 99285 EMERGENCY DEPT VISIT HI MDM: CPT | Performed by: PHYSICIAN ASSISTANT

## 2022-09-13 PROCEDURE — 36415 COLL VENOUS BLD VENIPUNCTURE: CPT

## 2022-09-13 PROCEDURE — 85652 RBC SED RATE AUTOMATED: CPT | Performed by: PHYSICIAN ASSISTANT

## 2022-09-13 PROCEDURE — 73630 X-RAY EXAM OF FOOT: CPT

## 2022-09-13 PROCEDURE — 86140 C-REACTIVE PROTEIN: CPT | Performed by: PHYSICIAN ASSISTANT

## 2022-09-13 PROCEDURE — 99284 EMERGENCY DEPT VISIT MOD MDM: CPT

## 2022-09-13 RX ORDER — CEFEPIME HYDROCHLORIDE 2 G/50ML
2000 INJECTION, SOLUTION INTRAVENOUS ONCE
Status: COMPLETED | OUTPATIENT
Start: 2022-09-13 | End: 2022-09-14

## 2022-09-13 RX ADMIN — SODIUM CHLORIDE 500 ML: 0.9 INJECTION, SOLUTION INTRAVENOUS at 23:45

## 2022-09-13 RX ADMIN — CEFEPIME HYDROCHLORIDE 2000 MG: 2 INJECTION, SOLUTION INTRAVENOUS at 23:49

## 2022-09-14 PROBLEM — L03.90 CELLULITIS: Status: ACTIVE | Noted: 2022-09-14

## 2022-09-14 LAB
ANION GAP SERPL CALCULATED.3IONS-SCNC: 7 MMOL/L (ref 4–13)
BUN SERPL-MCNC: 30 MG/DL (ref 5–25)
CALCIUM SERPL-MCNC: 9.4 MG/DL (ref 8.4–10.2)
CHLORIDE SERPL-SCNC: 105 MMOL/L (ref 96–108)
CO2 SERPL-SCNC: 22 MMOL/L (ref 21–32)
CREAT SERPL-MCNC: 0.97 MG/DL (ref 0.6–1.3)
GFR SERPL CREATININE-BSD FRML MDRD: 79 ML/MIN/1.73SQ M
GLUCOSE SERPL-MCNC: 114 MG/DL (ref 65–140)
GLUCOSE SERPL-MCNC: 119 MG/DL (ref 65–140)
GLUCOSE SERPL-MCNC: 146 MG/DL (ref 65–140)
GLUCOSE SERPL-MCNC: 98 MG/DL (ref 65–140)
PLATELET # BLD AUTO: 51 THOUSANDS/UL (ref 149–390)
PMV BLD AUTO: 13.1 FL (ref 8.9–12.7)
POTASSIUM SERPL-SCNC: 4.1 MMOL/L (ref 3.5–5.3)
SODIUM SERPL-SCNC: 134 MMOL/L (ref 135–147)

## 2022-09-14 PROCEDURE — 80048 BASIC METABOLIC PNL TOTAL CA: CPT | Performed by: INTERNAL MEDICINE

## 2022-09-14 PROCEDURE — 82948 REAGENT STRIP/BLOOD GLUCOSE: CPT

## 2022-09-14 PROCEDURE — 36415 COLL VENOUS BLD VENIPUNCTURE: CPT

## 2022-09-14 PROCEDURE — 85049 AUTOMATED PLATELET COUNT: CPT

## 2022-09-14 PROCEDURE — 99214 OFFICE O/P EST MOD 30 MIN: CPT | Performed by: PODIATRIST

## 2022-09-14 PROCEDURE — 87186 SC STD MICRODIL/AGAR DIL: CPT | Performed by: STUDENT IN AN ORGANIZED HEALTH CARE EDUCATION/TRAINING PROGRAM

## 2022-09-14 PROCEDURE — 87077 CULTURE AEROBIC IDENTIFY: CPT | Performed by: STUDENT IN AN ORGANIZED HEALTH CARE EDUCATION/TRAINING PROGRAM

## 2022-09-14 PROCEDURE — 0H9NXZZ DRAINAGE OF LEFT FOOT SKIN, EXTERNAL APPROACH: ICD-10-PCS | Performed by: PODIATRIST

## 2022-09-14 PROCEDURE — 99223 1ST HOSP IP/OBS HIGH 75: CPT | Performed by: INTERNAL MEDICINE

## 2022-09-14 PROCEDURE — 87070 CULTURE OTHR SPECIMN AEROBIC: CPT | Performed by: STUDENT IN AN ORGANIZED HEALTH CARE EDUCATION/TRAINING PROGRAM

## 2022-09-14 PROCEDURE — 87205 SMEAR GRAM STAIN: CPT | Performed by: STUDENT IN AN ORGANIZED HEALTH CARE EDUCATION/TRAINING PROGRAM

## 2022-09-14 RX ORDER — DILTIAZEM HYDROCHLORIDE 180 MG/1
180 CAPSULE, COATED, EXTENDED RELEASE ORAL DAILY
Status: DISCONTINUED | OUTPATIENT
Start: 2022-09-14 | End: 2022-09-16 | Stop reason: HOSPADM

## 2022-09-14 RX ORDER — ACETAMINOPHEN 325 MG/1
650 TABLET ORAL EVERY 6 HOURS PRN
Status: DISCONTINUED | OUTPATIENT
Start: 2022-09-14 | End: 2022-09-16 | Stop reason: HOSPADM

## 2022-09-14 RX ORDER — VANCOMYCIN HYDROCHLORIDE 1 G/200ML
10 INJECTION, SOLUTION INTRAVENOUS EVERY 12 HOURS
Status: DISCONTINUED | OUTPATIENT
Start: 2022-09-14 | End: 2022-09-14

## 2022-09-14 RX ORDER — INSULIN GLARGINE 100 [IU]/ML
40 INJECTION, SOLUTION SUBCUTANEOUS
Status: DISCONTINUED | OUTPATIENT
Start: 2022-09-14 | End: 2022-09-15

## 2022-09-14 RX ORDER — INSULIN LISPRO 100 [IU]/ML
1-6 INJECTION, SOLUTION INTRAVENOUS; SUBCUTANEOUS
Status: DISCONTINUED | OUTPATIENT
Start: 2022-09-14 | End: 2022-09-16 | Stop reason: HOSPADM

## 2022-09-14 RX ORDER — CEFAZOLIN SODIUM 2 G/50ML
2000 SOLUTION INTRAVENOUS EVERY 8 HOURS
Status: DISCONTINUED | OUTPATIENT
Start: 2022-09-14 | End: 2022-09-16 | Stop reason: HOSPADM

## 2022-09-14 RX ORDER — HEPARIN SODIUM 5000 [USP'U]/ML
5000 INJECTION, SOLUTION INTRAVENOUS; SUBCUTANEOUS EVERY 8 HOURS SCHEDULED
Status: DISCONTINUED | OUTPATIENT
Start: 2022-09-14 | End: 2022-09-16 | Stop reason: HOSPADM

## 2022-09-14 RX ORDER — CEFEPIME HYDROCHLORIDE 2 G/50ML
2000 INJECTION, SOLUTION INTRAVENOUS EVERY 12 HOURS
Status: DISCONTINUED | OUTPATIENT
Start: 2022-09-14 | End: 2022-09-14

## 2022-09-14 RX ORDER — LOSARTAN POTASSIUM 50 MG/1
50 TABLET ORAL DAILY
Status: DISCONTINUED | OUTPATIENT
Start: 2022-09-14 | End: 2022-09-14

## 2022-09-14 RX ORDER — SODIUM CHLORIDE 9 MG/ML
75 INJECTION, SOLUTION INTRAVENOUS CONTINUOUS
Status: DISCONTINUED | OUTPATIENT
Start: 2022-09-14 | End: 2022-09-15

## 2022-09-14 RX ADMIN — INSULIN GLARGINE 40 UNITS: 100 INJECTION, SOLUTION SUBCUTANEOUS at 01:54

## 2022-09-14 RX ADMIN — HEPARIN SODIUM 5000 UNITS: 5000 INJECTION INTRAVENOUS; SUBCUTANEOUS at 01:53

## 2022-09-14 RX ADMIN — DILTIAZEM HYDROCHLORIDE 180 MG: 180 CAPSULE, COATED, EXTENDED RELEASE ORAL at 10:23

## 2022-09-14 RX ADMIN — HEPARIN SODIUM 5000 UNITS: 5000 INJECTION INTRAVENOUS; SUBCUTANEOUS at 15:55

## 2022-09-14 RX ADMIN — VANCOMYCIN HYDROCHLORIDE 1750 MG: 1 INJECTION, POWDER, LYOPHILIZED, FOR SOLUTION INTRAVENOUS at 00:42

## 2022-09-14 RX ADMIN — SODIUM CHLORIDE 75 ML/HR: 0.9 INJECTION, SOLUTION INTRAVENOUS at 10:24

## 2022-09-14 RX ADMIN — HEPARIN SODIUM 5000 UNITS: 5000 INJECTION INTRAVENOUS; SUBCUTANEOUS at 22:05

## 2022-09-14 RX ADMIN — CEFAZOLIN SODIUM 2000 MG: 2 SOLUTION INTRAVENOUS at 11:42

## 2022-09-14 RX ADMIN — CEFAZOLIN SODIUM 2000 MG: 2 SOLUTION INTRAVENOUS at 19:52

## 2022-09-14 NOTE — ASSESSMENT & PLAN NOTE
Recent Labs     09/13/22 2034 09/14/22  1518 09/15/22  0424   CREATININE 1 44* 0 97 0 87   EGFR 49 79 88     Estimated Creatinine Clearance: 103 9 mL/min (by C-G formula based on SCr of 0 87 mg/dL)  Patient is back to around baseline creatinine, approximately 0 9 after IV fluids  On losartan and triamterene-hctz for HTN    Plan:  Discontinued IV fluids    Morning BMP  Continue to Hold ACE/ARB and diuretics, avoid nephrotoxins

## 2022-09-14 NOTE — ASSESSMENT & PLAN NOTE
Patient says he has been trying to cut back and reports his last drink was approximately one month ago  Education about excessive drinking provided

## 2022-09-14 NOTE — ASSESSMENT & PLAN NOTE
Patient has a history of consuming 3-4 rum drinks nightly but has been trying to cut back recently    He says last drink was about one month ago  CT of the abdomen in 2020 showed findings suggesting portal hypertension, including splenomegaly, varices, prominence of the portal vein, etc          Plan:  Education about excessive alcohol intake provided

## 2022-09-14 NOTE — ASSESSMENT & PLAN NOTE
Chronic in the setting of alcohol abuse  Plts: 62 --> 51--> 54 (09/15)   Currently on heparin for VTE prophylaxis    - Patient advised to follow up with PCP  - continue to monitor levels

## 2022-09-14 NOTE — ASSESSMENT & PLAN NOTE
Recent Labs     09/13/22 2034   CREATININE 1 44*   EGFR 49     Estimated Creatinine Clearance: 62 8 mL/min (A) (by C-G formula based on SCr of 1 44 mg/dL (H))       Baseline creatinine is approximately 0 9,   BUN 44  BUN/Cr ratio >20 suggestive of prerenal etiology   On losartan and triamterene-hctz for HTN    Plan:  NS 75 ml/hr  Hold ACE/ARB and diuretics, avoid nephrotoxins

## 2022-09-14 NOTE — ASSESSMENT & PLAN NOTE
Lab Results   Component Value Date    HGBA1C 7 7 (H) 09/12/2022       No results for input(s): POCGLU in the last 72 hours      Blood Sugar Average: Last 72 hrs:     Patient says morning glucose this morning was in the 90s and he has been working to control his diabetes  On lantus 40 U Qhs   Follows up with ophthalmology

## 2022-09-14 NOTE — ASSESSMENT & PLAN NOTE
Lab Results   Component Value Date    HGBA1C 7 7 (H) 09/12/2022       Recent Labs     09/14/22  2209 09/15/22  0552 09/15/22  0743 09/15/22  1113   POCGLU 98 94 107 131       Blood Sugar Average: Last 72 hrs:     Patient says morning glucose this morning was in the 90s and he has been working to control his diabetes  Patient reported he is on Lantus 40 mg, glimepiride and Jardiance at home  Follows up with ophthalmology  BGs slightly on lower side this morning as noted above      Plan  - decrease home Lantus from 40 to 30 units HS  - SSI, fingerstick glucose checks

## 2022-09-14 NOTE — ED PROVIDER NOTES
History  Chief Complaint   Patient presents with    Foot Pain     Pt has diabetic ulcer on underside of  L foot near 2nd/3rd toe  Appointment tomorrow with podiatry, wanted to be seen in ER for increasing pain, swelling  Area is warm to touch  Denies fevers  Patient is a 78-year-old male with history of diabetes, presenting to the emergency room for evaluation  Patient states he has a wound on the bottom of his left foot  He states he wore different shoes than usual this weekend and was walking around a lot  He states it started off as a blister and got progressively more swollen, painful  Started expressing a serosanguineous drainage  Patient states his left 2nd toe is also erythematous, which is abnormal   He denies any fever, chills, chest pain, shortness of breath  History provided by:  Patient   used: No        Prior to Admission Medications   Prescriptions Last Dose Informant Patient Reported? Taking? Insulin Pen Needle (BD Pen Needle Nayla 2nd Gen) 32G X 4 MM MISC 9/14/2022 at Unknown time  No Yes   Sig: For use with insulin pen  Pharmacy may dispense brand covered by insurance     Multiple Vitamin (multivitamin) capsule 9/14/2022 at Unknown time  No Yes   Sig: Take 1 capsule by mouth daily   cholecalciferol (VITAMIN D3) 1,000 units tablet   No No   Sig: Take 2 tablets (2,000 Units total) by mouth daily   diltiazem (CARDIZEM CD) 180 mg 24 hr capsule 9/14/2022 at Unknown time  Yes Yes   folic acid (FOLVITE) 1 mg tablet   No No   Sig: Take 1 tablet (1 mg total) by mouth daily   insulin glargine (Lantus SoloStar) 100 units/mL injection pen 9/13/2022 at Unknown time Self No Yes   Sig: Inject 30 Units under the skin daily at bedtime   Patient taking differently: Inject 40 Units under the skin daily at bedtime   loratadine (CLARITIN) 10 mg tablet   No No   Sig: Take 1 tablet (10 mg total) by mouth daily   losartan (COZAAR) 50 mg tablet 9/14/2022 at Unknown time  Yes Yes pantoprazole (PROTONIX) 40 mg tablet   No No   Sig: Take 1 tablet (40 mg total) by mouth daily On an empty stomatch, at least 30 mins before breakfast   thiamine 100 MG tablet   No No   Sig: Take 1 tablet (100 mg total) by mouth daily   triamterene-hydrochlorothiazide (MAXZIDE-25) 37 5-25 mg per tablet 9/14/2022 at Unknown time  Yes Yes   Sig: Take 1 tablet by mouth daily      Facility-Administered Medications: None       Past Medical History:   Diagnosis Date    Arrhythmia     Diabetes mellitus ([facility] [address] )     History of echocardiogram 11/14/2017    showed EF of 50-55 percentWith moderate LVH and left ventricle diastolic dysfunction  Left atrium was moderately enlarged  Trace MR noted   History of Holter monitoring 11/21/2017    showed baseline rhythm of sinus origin with an average heart it of 61 bpm  The lowest heart rate was 49 and the highest heart rate was 10 8 bpm  There were rare single VPCs, and frequent PACs representing 3 2% of total beats  There were several episodes of sinus arrhythmias with sinus bradycardia and heart rate ranging from 40-90 bpm  No sustained dysrhythmias, or pauses noted  The patient did not    Hypertension     Obese        Past Surgical History:   Procedure Laterality Date    EYE SURGERY Left 1997    HERNIA REPAIR  [id number]    KNEE ARTHROPLASTY Right 2008    SHOULDER SURGERY Right 2002       Family History   Problem Relation Age of Onset    Diabetes Mother     Supraventricular tachycardia Mother     COPD Father     Heart disease Father     Other Father         Sepsis; related to UTI with complicating cardiac problems    Heart disease Paternal Grandmother      I have reviewed and agree with the history as documented      E-Cigarette/Vaping    E-Cigarette Use Never User      E-Cigarette/Vaping Substances     Social History     Tobacco Use    Smoking status: Former Smoker     Packs/day: 0 50     Years: 20 00     Pack years: 10 00     Types: Cigarettes     Quit date:      Years since quittin 7    Smokeless tobacco: Never Used   Vaping Use    Vaping Use: Never used   Substance Use Topics    Alcohol use: Not Currently     Comment: quit     Drug use: Never       Review of Systems   Constitutional: Negative for chills and fever  HENT: Negative for ear pain and sore throat  Eyes: Negative for pain and visual disturbance  Respiratory: Negative for cough and shortness of breath  Cardiovascular: Negative for chest pain and palpitations  Gastrointestinal: Negative for abdominal pain and vomiting  Genitourinary: Negative for dysuria and hematuria  Musculoskeletal: Negative for arthralgias and back pain  Skin: Positive for wound  Negative for color change and rash  Neurological: Negative for seizures and syncope  All other systems reviewed and are negative  Physical Exam  Physical Exam  Vitals and nursing note reviewed  Constitutional:       Appearance: He is well-developed  HENT:      Head: Normocephalic and atraumatic  Eyes:      Conjunctiva/sclera: Conjunctivae normal    Cardiovascular:      Rate and Rhythm: Normal rate and regular rhythm  Heart sounds: No murmur heard  Pulmonary:      Effort: Pulmonary effort is normal  No respiratory distress  Breath sounds: Normal breath sounds  Abdominal:      Palpations: Abdomen is soft  Tenderness: There is no abdominal tenderness  Musculoskeletal:      Cervical back: Neck supple  Skin:     General: Skin is warm and dry  Comments: Wound noted to plantar surface of left foot  Erythema and tenderness to left 2nd toe  Intact pedal pulses   Neurological:      Mental Status: He is alert                      Vital Signs  ED Triage Vitals [22]   Temperature Pulse Respirations Blood Pressure SpO2   98 7 °F (37 1 °C) 85 18 122/57 96 %      Temp Source Heart Rate Source Patient Position - Orthostatic VS BP Location FiO2 (%)   Oral Monitor Sitting Right arm --      Pain Score       1           Vitals:    09/14/22 0315 09/14/22 0400 09/14/22 0500 09/14/22 0600   BP: 131/73 100/51 103/52 118/56   Pulse: 66 71 69 69   Patient Position - Orthostatic VS:             Visual Acuity  Visual Acuity    Flowsheet Row Most Recent Value   L Pupil Size (mm) 3   R Pupil Size (mm) 3   L Pupil Shape Round   R Pupil Shape Round          ED Medications  Medications   diltiazem (CARDIZEM CD) 24 hr capsule 180 mg (has no administration in time range)   insulin glargine (LANTUS) subcutaneous injection 40 Units 0 4 mL (40 Units Subcutaneous Given 9/14/22 0154)   heparin (porcine) subcutaneous injection 5,000 Units (5,000 Units Subcutaneous Not Given 9/14/22 0534)   cefepime (MAXIPIME) IVPB (premix in dextrose) 2,000 mg 50 mL (0 mg Intravenous Stopped 9/14/22 0037)   sodium chloride 0 9 % bolus 500 mL (0 mL Intravenous Stopped 9/14/22 0045)   vancomycin (VANCOCIN) 1,750 mg in sodium chloride 0 9 % 500 mL IVPB (0 mg/kg × 90 4 kg (Adjusted) Intravenous Stopped 9/14/22 0242)       Diagnostic Studies  Results Reviewed     Procedure Component Value Units Date/Time    Platelet count [[id number]]  (Abnormal) Collected: 09/14/22 0443    Lab Status: Final result Specimen: Blood from Arm, Right Updated: 09/14/22 0504     Platelets 51 Thousands/uL      MPV 13 1 fL     Blood culture #2 [[id number]] Collected: 09/13/22 2344    Lab Status: In process Specimen: Blood from Hand, Left Updated: 09/13/22 2356    Blood culture #1 [[id number]] Collected: 09/13/22 2344    Lab Status: In process Specimen: Blood from Hand, Right Updated: 09/13/22 2356    Lactic acid, plasma [[id number]]  (Normal) Collected: 09/13/22 2202    Lab Status: Final result Specimen: Blood from Arm, Right Updated: 09/13/22 2237     LACTIC ACID 0 9 mmol/L     Narrative:      Result may be elevated if tourniquet was used during collection      C-reactive protein [[id number]]  (Abnormal) Collected: 09/13/22 2202    Lab Status: Final result Specimen: Blood from Arm, Right Updated: 09/13/22 2226     CRP 18 1 mg/L     Sedimentation rate, automated [[id number]]  (Abnormal) Collected: 09/13/22 2202    Lab Status: Final result Specimen: Blood from Arm, Right Updated: 09/13/22 2210     Sed Rate 21 mm/hour     Comprehensive metabolic panel [[id number]]  (Abnormal) Collected: 09/13/22 2034    Lab Status: Final result Specimen: Blood from Arm, Right Updated: 09/13/22 2116     Sodium 138 mmol/L      Potassium 4 4 mmol/L      Chloride 103 mmol/L      CO2 26 mmol/L      ANION GAP 9 mmol/L      BUN 41 mg/dL      Creatinine 1 44 mg/dL      Glucose 167 mg/dL      Calcium 10 1 mg/dL      AST 39 U/L      ALT 45 U/L      Alkaline Phosphatase 127 U/L      Total Protein 7 4 g/dL      Albumin 4 0 g/dL      Total Bilirubin 1 01 mg/dL      eGFR 49 ml/min/1 73sq m     Narrative:      [facility] guidelines for [facility] Kid[facility] Disease (CKD):     Stage 1 with normal or high GFR (GFR > 90 mL/min/1 73 square meters)    Stage 2 Mild CKD (GFR = 60-89 mL/min/1 73 square meters)    Stage 3A Moderate CKD (GFR = 45-59 mL/min/1 73 square meters)    Stage 3B Moderate CKD (GFR = 30-44 mL/min/1 73 square meters)    Stage 4 Severe CKD (GFR = 15-29 mL/min/1 73 square meters)    Stage 5 End Stage CKD (GFR <15 mL/min/1 73 square meters)  Note: GFR calculation is accurate only with a steady state creatinine    CBC and differential [[id number]]  (Abnormal) Collected: 09/13/22 2034    Lab Status: Final result Specimen: Blood from Arm, Right Updated: 09/13/22 2058     WBC 4 02 Thousand/uL      RBC 4 97 Million/uL      Hemoglobin 15 1 g/dL      Hematocrit 45 9 %      MCV 92 fL      MCH 30 4 pg      MCHC 32 9 g/dL      RDW 14 0 %      MPV 13 2 fL      Platelets 62 Thousands/uL      nRBC 0 /100 WBCs      Neutrophils Relative 68 %      Immat GRANS % 1 %      Lymphocytes Relative 17 %      Monocytes Relative 10 %      Eosinophils Relative 3 %      Basophils Relative 1 %      Neutrophils Absolute 2 80 Thousands/µL      Immature Grans Absolute 0 02 Thousand/uL      Lymphocytes Absolute 0 67 Thousands/µL      Monocytes Absolute 0 38 Thousand/µL      Eosinophils Absolute 0 13 Thousand/µL      Basophils Absolute 0 02 Thousands/µL                  XR foot 3+ views LEFT   ED Interpretation by Patricia Hammer PA-C (09/14 6997)   No acute findings                MDM  Number of Diagnoses or Management Options  ABILIO (acute kidney injury) [facility]): new and requires workup  Diabetic foot infection ([place] [address] ): new and requires workup     Amount and/or Complexity of Data Reviewed  Clinical lab tests: ordered and reviewed  Tests in the radiology section of CPT®: ordered and reviewed    Risk of Complications, Morbidity, and/or Mortality  Presenting problems: high  Diagnostic procedures: high  Management options: high    Patient Progress  Patient progress: stable      Disposition  Final diagnoses:   Diabetic foot infection ([place] [address] )   ABILIO (acute kidney injury) ([place] [address] )     Time reflects when diagnosis was documented in both MDM as applicable and the Disposition within this note     Time User Action Codes Description Comment    9/13/2022 11:16 PM Danika Junk Add [E11 628,  L08 9] [facility]     9/13/2022 11:16 PM Danika Junk Add [N17 9] ABILIO (acute kidney injury) [facility])       ED Disposition     ED Disposition   Admit    Condition   Stable    Date/Time   Tue Sep 13, 2022 11:30 PM    Comment   Case was discussed with CODIE and the patient's admission status was agreed to be Admission Status: observation status to the service of Dr Noah Nagy   Follow-up Information    None         Patient's Medications   Discharge Prescriptions    No medications on file       No discharge procedures on file      PDMP Review       Value Time User    PDMP Reviewed  Yes 12/27/2020 10:39 AM Nuvia Bell MD          ED Provider  Electronically Signed by           Patricia Hammer PA-C  09/14/22 8564

## 2022-09-14 NOTE — ASSESSMENT & PLAN NOTE
Patient presents with left sided foot wound under the second toe  Follows up with Dr Justo Aguilar for podiatry and wound care  Usually wears tight fitting diabetic shoes but has worn loose fitting sneakers for the past week that resulted in the formation of a blister  Patient denies any fever, pain, foul smell or drainage   Former healing blister present on the plantar surface of the anterior left foot that is immediately adjacent to new blood filled blister under second toe  He was seen in May 2022 for a similar problem for which he was discharged on 7 day course of keflex   Second left toe appears erythematous and inflamed and does not extend to the foot  Pedal pulses intact bilaterally    No loss of sensation or proprioception on physical exam   Xray of left foot unremarkable for osteomyelitis     Plan:  Cefepime 2 g IV  Vancomycin 1750 mg IV  Wound care and podiatry consulted - recommendations are appreciated

## 2022-09-14 NOTE — CONSULTS
Consult - Ermelinda Davalos Juan Alberto 74 y o  male MRN: [id number]  Unit/Bed#: ED-35 Encounter: [id number]    Assessment/Plan     Assessment:  Type 2 Diabetes mellitus with polyneuropathy  Left foot blister  Left foot ulceration  Left 2nd digit cellulitis    Plan:  Patient seen and examined at bedside in ED  Blister deroofed, fluid culture swabbed  Pending results  Left foot dressed with betadine, fluffed gauze, kerlix and ACE wrap  Instructed to partially weight bear to left heel in surgical shoe   Continue IV Abx for cellulitis of left 2nd digit  Recommending admission to observation unit   No acute podiatric surgical intervention warranted at this time  Will continue to follow    History of Present Illness     HPI:  Ángel Patel is a 74 y o  male who presents with worsening cellulitis of left 2nd digit and blistering to plantar aspect of the digit  He relates that he had an appointment with Dr Sparkle Iqbal with podiatry today, however, decided to come to the ED last night due to worsening erythema of the toe  He does have neuropathy and states that the blistering does not hurt  He routinely wears diabetic shoes with custom made inserts, however, over the weekend he attended an event and wore loose fitting old sneakers which might have caused extra shear  He noticed a pink-colored drainage from the area yesterday  He sees Dr Bernardo Dewitt for routine debridement of the callus to the plantar aspect of the left 2nd metatarsal head, with his last appointment being in July 2022  Denies any constitutional signs of sepsis  Inpatient consult to Podiatry  Consult performed by: Keven Camargo DPM  Consult ordered by: Waylon Baker MD        Review of Systems   Constitutional: Negative  HENT: Negative  Eyes: Negative  Respiratory: Negative  Cardiovascular: Negative  Gastrointestinal: Negative      Musculoskeletal: Negative   Skin:Endorses left 2nd digit erythema, blistering   Neurological: Endorses numbness to feet bilaterally  Psych: negative      Historical Information   Past Medical History:   Diagnosis Date    Arrhythmia     Diabetes mellitus ([address] )     History of echocardiogram 2017    showed EF of 50-55 percentWith moderate LVH and left ventricle diastolic dysfunction  Left atrium was moderately enlarged  Trace MR noted   History of Holter monitoring 2017    showed baseline rhythm of sinus origin with an average heart it of 61 bpm  The lowest heart rate was 49 and the highest heart rate was 10 8 bpm  There were rare single VPCs, and frequent PACs representing 3 2% of total beats  There were several episodes of sinus arrhythmias with sinus bradycardia and heart rate ranging from 40-90 bpm  No sustained dysrhythmias, or pauses noted   The patient did not    Hypertension     Obese      Past Surgical History:   Procedure Laterality Date    EYE SURGERY Left 1997    HERNIA REPAIR  [id number]    KNEE ARTHROPLASTY Right 2008    SHOULDER SURGERY Right      Social History   Social History     Substance and Sexual Activity   Alcohol Use Not Currently    Comment: quit      Social History     Substance and Sexual Activity   Drug Use Never     Social History     Tobacco Use   Smoking Status Former Smoker    Packs/day: 0 50    Years: 20 00    Pack years: 10 00    Types: Cigarettes    Quit date: Douglas Quiet Years since quittin 7   Smokeless Tobacco Never Used     Family History:   Family History   Problem Relation Age of Onset    Diabetes Mother     Supraventricular tachycardia Mother     COPD Father     Heart disease Father     Other Father         Sepsis; related to UTI with complicating cardiac problems    Heart disease Paternal Grandmother        Meds/Allergies   (Not in a hospital admission)    Allergies   Allergen Reactions    Sulfa Antibiotics        Objective   First Vitals:   Blood Pressure: 122/57 (22)  Pulse: 85 (22)  Temperature: 98 7 °F (37 1 °C) (09/13/22 2025)  Temp Source: Oral (09/13/22 2025)  Respirations: 18 (09/13/22 2025)  Height: 5' 11" (180 3 cm) (09/13/22 2025)  SpO2: 96 % (09/13/22 2025)    Current Vitals:   Blood Pressure: 120/62 (09/14/22 0800)  Pulse: 67 (09/14/22 0800)  Temperature: 98 7 °F (37 1 °C) (09/13/22 2025)  Temp Source: Oral (09/13/22 2025)  Respirations: 18 (09/14/22 0800)  Height: 5' 11" (180 3 cm) (09/13/22 2025)  SpO2: 98 % (09/14/22 0800)        /62 (BP Location: Right arm)   Pulse 67   Temp 98 7 °F (37 1 °C) (Oral)   Resp 18   Ht 5' 11" (1 803 m)   SpO2 98%   BMI 34 87 kg/m²   Physical Exam:  General:    Alert, cooperative, no distress   Head:     Normocephalic, without obvious abnormality, atraumatic                   Skin:   Serosanguinous fluid filled blister extending from callus on plantar aspect of left 2nd metatarsal head and into the 2nd digital sulcus  No malodor from fluid, no purulence expressed  Wound noted underneath callus down to fat layer  No probe to bone  Lungs:     Respirations unlabored   Chest wall:    No tenderness or deformity   Heart/vasc:    DP and PT pulses palpable bilaterally  Skin temp warm to touch to b/l feet  CFT within normal limits  Abdomen:     Not assessed           Lower MSK:   No tenderness elicited on palpation to left foot  Subtle cavus foot type B/L with hypermobile 1st ray to L foot  Psychiatric:  AAOx3           Neurologic:   Light touch grossly diminished to bilateral LE  Protective sensation grossly diminished to bilateral feet        Lab Results:   Admission on 09/13/2022   Component Date Value    WBC 09/13/2022 4 02 (A)    RBC 09/13/2022 4 97     Hemoglobin 09/13/2022 15 1     Hematocrit 09/13/2022 45 9     MCV 09/13/2022 92     MCH 09/13/2022 30 4     MCHC 09/13/2022 32 9     RDW 09/13/2022 14 0     MPV 09/13/2022 13 2 (A)    Platelets 27/06/3905 62 (A)    nRBC 09/13/2022 0     Neutrophils Relative 09/13/2022 68     Immat GRANS % 09/13/2022 1     Lymphocytes Relative 09/13/2022 17     Monocytes Relative 09/13/2022 10     Eosinophils Relative 09/13/2022 3     Basophils Relative 09/13/2022 1     Neutrophils Absolute 09/13/2022 2 80     Immature Grans Absolute 09/13/2022 0 02     Lymphocytes Absolute 09/13/2022 0 67     Monocytes Absolute 09/13/2022 0 38     Eosinophils Absolute 09/13/2022 0 13     Basophils Absolute 09/13/2022 0 02     Sodium 09/13/2022 138     Potassium 09/13/2022 4 4     Chloride 09/13/2022 103     CO2 09/13/2022 26     ANION GAP 09/13/2022 9     BUN 09/13/2022 41 (A)    Creatinine 09/13/2022 1 44 (A)    Glucose 09/13/2022 167 (A)    Calcium 09/13/2022 10 1     AST 09/13/2022 39     ALT 09/13/2022 45     Alkaline Phosphatase 09/13/2022 127 (A)    Total Protein 09/13/2022 7 4     Albumin 09/13/2022 4 0     Total Bilirubin 09/13/2022 1 01 (A)    eGFR 09/13/2022 49     CRP 09/13/2022 18 1 (A)    Sed Rate 09/13/2022 21 (A)    LACTIC ACID 09/13/2022 0 9     Blood Culture 09/13/2022 Received in Microbiology Lab  Culture in Progress   Blood Culture 09/13/2022 Received in Microbiology Lab  Culture in Progress   Platelets 47/17/4031 51 (A)    MPV 09/14/2022 13 1 (A)           Results from last 7 days   Lab Units 09/13/22  2344   BLOOD CULTURE  Received in Microbiology Lab  Culture in Progress  Received in Microbiology Lab  Culture in Progress  Left foot xray (9/13/22):    FINDINGS:  There is no acute fracture or dislocation      No bony erosions or periosteal reaction to suggest osteomyelitis      Chronic findings of severe 1st metatarsophalangeal joint osteoarthritis, and 2nd toe metatarsophalangeal joint subluxation      Chronic moderate 2nd through 5th tarsometatarsal joint osteoarthritis      No lytic or blastic osseous lesion      Soft tissues are unremarkable        Code Status: Level 1 - Full Code        Portions of the record may have been created with voice recognition software  Occasional wrong word or "sound a like" substitutions may have occurred due to the inherent limitations of voice recognition software  Read the chart carefully and recognize, using context, where substitutions have occurred

## 2022-09-14 NOTE — ASSESSMENT & PLAN NOTE
Recent Labs     09/13/22 2034   WBC 4 02*     Leukopenia in the setting of chronic alcohol use and liver cirrhosis    Patient advised to follow up with PCP

## 2022-09-14 NOTE — ASSESSMENT & PLAN NOTE
Recent Labs     09/13/22 2034   WBC 4 02*     Leukopenia in the setting of chronic alcohol use and liver cirrhosis    Plan  - Patient advised to follow up with PCP

## 2022-09-14 NOTE — ASSESSMENT & PLAN NOTE
POA: Patient presents with left sided foot wound under the second toe  Follows up with Dr Justo Aguilar for podiatry and wound care  Usually wears tight fitting diabetic shoes but has worn loose fitting sneakers for the past week that resulted in the formation of a blister  Former healing blister present on the plantar surface of the anterior left foot that is immediately adjacent to new blood filled blister under second toe  He was seen in May 2022 for a similar problem for which he was discharged on 7 day course of keflex     -Patient continues to denies any fever, pain, foul smell or drainage    Second left toe appears erythematous and inflamed and does not extend to the foot  - Xray of left foot unremarkable for osteomyelitis  - podiatry debrided wound yesterday    Plan  - podiatry recs:  No need for surgery or MRI at this time   - IV antibiotics for 48 hours: Cefepime 2 g IV, Vancomycin 1750 mg IV  - p o   Antibiotics starting tomorrow  - follow-up wound/blood cultures

## 2022-09-14 NOTE — ASSESSMENT & PLAN NOTE
Chronic in the setting of alcohol abuse   Platelet count on this admission is 62k    Patient advised to follow up with PCP

## 2022-09-14 NOTE — PLAN OF CARE
Problem: PAIN - ADULT  Goal: Verbalizes/displays adequate comfort level or baseline comfort level  Description: Interventions:  - Encourage patient to monitor pain and request assistance  - Assess pain using appropriate pain scale  - Administer analgesics based on type and severity of pain and evaluate response  - Implement non-pharmacological measures as appropriate and evaluate response  - Consider cultural and social influences on pain and pain management  - Notify physician/advanced practitioner if interventions unsuccessful or patient reports new pain  Outcome: Progressing     Problem: INFECTION - ADULT  Goal: Absence or prevention of progression during hospitalization  Description: INTERVENTIONS:  - Assess and monitor for signs and symptoms of infection  - Monitor lab/diagnostic results  - Monitor all insertion sites, i e  indwelling lines, tubes, and drains  - Monitor endotracheal if appropriate and nasal secretions for changes in amount and color  - [place] appropriate cooling/warming therapies per order  - Administer medications as ordered  - Instruct and encourage patient and family to use good hand hygiene technique  - Identify and instruct in appropriate isolation precautions for identified infection/condition  Outcome: Progressing  Goal: Absence of fever/infection during neutropenic period  Description: INTERVENTIONS:  - Monitor WBC    Outcome: Progressing     Problem: SAFETY ADULT  Goal: Patient will remain free of falls  Description: INTERVENTIONS:  - Educate patient/family on patient safety including physical limitations  - Instruct patient to call for assistance with activity   - Consult OT/PT to assist with strengthening/mobility   - Keep Call bell within reach  - Keep bed low and locked with side rails adjusted as appropriate  - Keep care items and personal belongings within reach  - Initiate and maintain comfort rounds  - Make Fall Risk Sign visible to staff  - Offer Toileting every  Hours, in advance of need  - Initiate/Maintain alarm  - Obtain necessary fall risk management equipment  - Apply yellow socks and bracelet for high fall risk patients  - Consider moving patient to room near nurses station  Outcome: Progressing  Goal: Maintain or return to baseline ADL function  Description: INTERVENTIONS:  -  Assess patient's ability to carry out ADLs; assess patient's baseline for ADL function and identify physical deficits which impact ability to perform ADLs (bathing, care of mouth/teeth, toileting, grooming, dressing, etc )  - Assess/evaluate cause of self-care deficits   - Assess range of motion  - Assess patient's mobility; develop plan if impaired  - Assess patient's need for assistive devices and provide as appropriate  - Encourage maximum independence but intervene and supervise when necessary  - Involve family in performance of ADLs  - Assess for home care needs following discharge   - Consider OT consult to assist with ADL evaluation and planning for discharge  - Provide patient education as appropriate  Outcome: Progressing  Goal: Maintains/Returns to pre admission functional level  Description: INTERVENTIONS:  - Perform BMAT or MOVE assessment daily    - Set and communicate daily mobility goal to care team and patient/family/caregiver  - Collaborate with rehabilitation services on mobility goals if consulted  - Perform Range of Motion  times a day  - Reposition patient every  hours    - Dangle patient  times a day  - Stand patient  times a day  - Ambulate patient  times a day  - Out of bed to chair  times a day   - Out of bed for meal times a day  - Out of bed for toileting  - Record patient progress and toleration of activity level   Outcome: Progressing     Problem: DISCHARGE PLANNING  Goal: Discharge to home or other facility with appropriate resources  Description: INTERVENTIONS:  - Identify barriers to discharge wtient and caregiver  - Arrange for needed discharge resources and transportation as appropriate  - Identify discharge learning needs (meds, wound care, etc )  - Arrange for interpretive services to assist at discharge as needed  - Refer to Case Management Department for coordinating discharge planning if the patient needs post-hospital services based on physician/advanced practitioner order or complex needs related to functional status, cognitive ability, or social support system  Outcome: Progressing     Problem: Knowledge Deficit  Goal: Patient/family/caregiver demonstrates understanding of disease process, treatment plan, medications, and discharge instructions  Description: Complete learning assessment and assess knowledge base    Interventions:  - Provide teaching at level of understanding  - Provide teaching via preferred learning methods  Outcome: Progressing

## 2022-09-14 NOTE — H&P
[facility]&P- Brynn Loera 1955, 74 y o  male MRN: [id number]  Unit/Bed#: ED-35 Encounter: [id number]  Primary Care Provider: Brock Davison MD   Date and time admitted to hospital: 9/13/2022  9:41 PM    * Cellulitis  Assessment & Plan  Patient presents with left sided foot wound under the second toe  Follows up with Dr Lewis List for podiatry and wound care  Usually wears tight fitting diabetic shoes but has worn loose fitting sneakers for the past week that resulted in the formation of a blister  Patient denies any fever, pain, foul smell or drainage   Former healing blister present on the plantar surface of the anterior left foot that is immediately adjacent to new blood filled blister under second toe  He was seen in May 2022 for a similar problem for which he was discharged on 7 day course of keflex   Second left toe appears erythematous and inflamed and does not extend to the foot  Pedal pulses intact bilaterally  No loss of sensation or proprioception on physical exam   Xray of left foot unremarkable for osteomyelitis     Plan:  Cefepime 2 g IV  Vancomycin 1750 mg IV  Wound care and podiatry consulted - recommendations are appreciated       ABILIO (acute kidney injury) [facility])  Assessment & Plan  Recent Labs     09/13/22 2034   CREATININE 1 44*   EGFR 49     Estimated Creatinine Clearance: 62 8 mL/min (A) (by C-G formula based on SCr of 1 44 mg/dL (H))  Baseline creatinine is approximately 0 9,   BUN 44  BUN/Cr ratio >20 suggestive of prerenal etiology   On losartan and triamterene-hctz for HTN    Plan:  NS 75 ml/hr  Hold ACE/ARB and diuretics, avoid nephrotoxins      Type 2 diabetes mellitus (HCC)  Assessment & Plan  Lab Results   Component Value Date    HGBA1C 7 7 (H) 09/12/2022       No results for input(s): POCGLU in the last 72 hours      Blood Sugar Average: Last 72 hrs:     Patient says morning glucose this morning was in the 90s and he has been working to control his diabetes  On lantus 40 U Qhs   Follows up with ophthalmology    Benign essential HTN  Assessment & Plan  Well controlled on HCTZ-triamterene and losartan  Will hold in the setting of ABILIO    Leukopenia  Assessment & Plan  Recent Labs     09/13/22 2034   WBC 4 02*     Leukopenia in the setting of chronic alcohol use and liver cirrhosis    Patient advised to follow up with PCP     Thrombocytopenia ([facility] [address] )  Assessment & Plan  Chronic in the setting of alcohol abuse   Platelet count on this admission is 62k    Patient advised to follow up with PCP         Alcoholic cirrhosis ([facility] [address] )  Assessment & Plan  Patient has a history of consuming 3-4 rum drinks nightly but has been trying to cut back recently  He says last drink was about one month ago  CT of the abdomen in 2020 showed findings suggesting portal hypertension, including splenomegaly, varices, prominence of the portal vein, etc          Plan:  Education about excessive alcohol intake provided      Alcohol abuse  Assessment & Plan  Patient says he has been trying to cut back and reports his last drink was approximately one month ago  Education about excessive drinking provided          VTE Pharmacologic Prophylaxis: VTE Score: 6 Moderate Risk (Score 3-4) - Pharmacological DVT Prophylaxis Contraindicated  Sequential Compression Devices Ordered  Code Status: Level 1 - Full Code   Discussion with family: Patient declined call to   Anticipated Length of Stay: Patient will be admitted on an observation basis with an anticipated length of stay of less than 2 midnights secondary to cellulitis  Chief Complaint: foot wound    History of Present Illness:  Gib Cooks is a 74 y o  male with a PMH of type 2 diabetes, hypertension and alcoholic cirrhosis, who presents with left foot wound  Patient has a past medical history that includes diabetic foot ulcers requiring debridement for which she sees Dr Dary Simon for wound care and podiatry    Patient states that his last podiatry appointment he was instructed to wear tight-fitting diabetic shoes  He says that he has been compliant with wearing recommended footwear but in the last week has been wearing loose-fitting scatter sneakers  He noticed on Sunday evening that a blister was forming on his left 2nd toe that has since progressively become more inflamed  Patient denies having any fever, sweats, or chills  He does not complain of pain  Patient has not noticed any purulent drainage or foul smell coming from the wound  He was seen in May of 2022 MAR[facility] horn for a similar problem where he underwent bedside debridement was discharged on a 5 day course of Keflex  He has since been following up with Podiatry and wound care regularly and he says that his wife checks his feet frequently  Additional medical history includes cirrhosis secondary to excessive alcohol consumption  Patient has a history of consuming 3-4 rum drinks per evening but has been trying to cut back in the past several months  Patient reports that his last drink was approximately 1 month ago  Lab work has repeatedly showed leukopenia and thrombocytopenia for which he was seen by Hematology in July of 2021  Per Hematology note leukopenia and thrombocytopenia are consistent with chronic excessive alcohol consumption and cirrhosis  Patient was advised to stop alcohol consumption and to follow-up with PCP to to monitor CBC  Review of Systems:  Review of Systems   Constitutional: Negative for chills and fever  HENT: Negative for ear pain and sore throat  Eyes: Negative for pain and visual disturbance  Respiratory: Negative for cough and shortness of breath  Cardiovascular: Negative for chest pain and palpitations  Gastrointestinal: Negative for abdominal pain and vomiting  Genitourinary: Negative for dysuria and hematuria  Musculoskeletal: Negative for arthralgias and back pain  Skin: Positive for color change  Negative for rash  Neurological: Negative for seizures and syncope  All other systems reviewed and are negative  Past Medical and Surgical History:   Past Medical History:   Diagnosis Date    Arrhythmia     Diabetes mellitus ([address] )     History of echocardiogram 11/14/2017    showed EF of 50-55 percentWith moderate LVH and left ventricle diastolic dysfunction  Left atrium was moderately enlarged  Trace MR noted   History of Holter monitoring 11/21/2017    showed baseline rhythm of sinus origin with an average heart it of 61 bpm  The lowest heart rate was 49 and the highest heart rate was 10 8 bpm  There were rare single VPCs, and frequent PACs representing 3 2% of total beats  There were several episodes of sinus arrhythmias with sinus bradycardia and heart rate ranging from 40-90 bpm  No sustained dysrhythmias, or pauses noted  The patient did not    Hypertension     Obese        Past Surgical History:   Procedure Laterality Date    EYE SURGERY Left 1997    HERNIA REPAIR  [id number]    KNEE ARTHROPLASTY Right 2008    SHOULDER SURGERY Right 2002       Meds/Allergies:  Prior to Admission medications    Medication Sig Start Date End Date Taking? Authorizing Provider   diltiazem (CARDIZEM CD) 180 mg 24 hr capsule  10/22/20  Yes Historical Provider, MD   insulin glargine (Lantus SoloStar) 100 units/mL injection pen Inject 30 Units under the skin daily at bedtime  Patient taking differently: Inject 40 Units under the skin daily at bedtime 5/19/22  Yes Mir Russ MD   Insulin Pen Needle (BD Pen Needle Nayla 2nd Gen) 32G X 4 MM MISC For use with insulin pen  Pharmacy may dispense brand covered by insurance   5/19/22  Yes Mir Russ MD   losartan (COZAAR) 50 mg tablet  10/23/20  Yes Historical Provider, MD   Multiple Vitamin (multivitamin) capsule Take 1 capsule by mouth daily 1/3/21  Yes Bernadette Gonzalez MD   triamterene-hydrochlorothiazide (MAXZIDE-25) 37 5-25 mg per tablet Take 1 tablet by mouth daily   Yes Historical Provider, MD   cholecalciferol (VITAMIN D3) 1,000 units tablet Take 2 tablets (2,000 Units total) by mouth daily 20  Issac Simon MD   folic acid (FOLVITE) 1 mg tablet Take 1 tablet (1 mg total) by mouth daily 20  Issac Simon MD   loratadine (CLARITIN) 10 mg tablet Take 1 tablet (10 mg total) by mouth daily 20  Issac Simon MD   pantoprazole (PROTONIX) 40 mg tablet Take 1 tablet (40 mg total) by mouth daily On an empty stomatch, at least 30 mins before breakfast 20  Issac Simon MD   thiamine 100 MG tablet Take 1 tablet (100 mg total) by mouth daily 20  Issac Simon MD   fluticasone-salmeterol (ADVAIR HFA) 323-18 MCG/ACT inhaler Inhale 2 puffs 2 (two) times a day Rinse mouth after use  Patient not taking: Reported on 7/15/2021  5/17/22  Historical ProviderMD De La Paz Donath 25-5 MG TABS  10/22/20 5/17/22  Historical Provider, MD     I have reviewed home medications with patient personally  Allergies:    Allergies   Allergen Reactions    Sulfa Antibiotics        Social History:  Marital Status: /Civil Union   Occupation:   Patient Pre-hospital Living Situation: Home  Patient Pre-hospital Level of Mobility: walks  Patient Pre-hospital Diet Restrictions:   Substance Use History:   Social History     Substance and Sexual Activity   Alcohol Use Not Currently    Comment: quit      Social History     Tobacco Use   Smoking Status Former Smoker    Packs/day: 0 50    Years: 20 00    Pack years: 10 00    Types: Cigarettes    Quit date: 44    Years since quittin 7   Smokeless Tobacco Never Used     Social History     Substance and Sexual Activity   Drug Use Never       Family History:  Family History   Problem Relation Age of Onset    Diabetes Mother     Supraventricular tachycardia Mother     COPD Father     Heart disease Father     Other Father         Sepsis; related to UTI with complicating cardiac problems    Heart disease Paternal Grandmother        Physical Exam:     Vitals:   Blood Pressure: 103/52 (09/14/22 0500)  Pulse: 69 (09/14/22 0500)  Temperature: 98 7 °F (37 1 °C) (09/13/22 2025)  Temp Source: Oral (09/13/22 2025)  Respirations: 18 (09/14/22 0500)  Height: 5' 11" (180 3 cm) (09/13/22 2025)  SpO2: 97 % (09/14/22 0500)    Physical Exam  Constitutional:       General: He is not in acute distress  Appearance: Normal appearance  He is normal weight  He is not ill-appearing or toxic-appearing  HENT:      Head: Normocephalic and atraumatic  Eyes:      General: No scleral icterus  Extraocular Movements: Extraocular movements intact  Conjunctiva/sclera: Conjunctivae normal       Pupils: Pupils are equal, round, and reactive to light  Cardiovascular:      Rate and Rhythm: Normal rate and regular rhythm  Pulses: Normal pulses  Dorsalis pedis pulses are 2+ on the right side and 2+ on the left side  Heart sounds: Normal heart sounds  No murmur heard  No gallop  Pulmonary:      Effort: Pulmonary effort is normal  No respiratory distress  Breath sounds: Normal breath sounds  No stridor  No wheezing, rhonchi or rales  Chest:      Chest wall: No tenderness  Abdominal:      General: Bowel sounds are normal  There is no distension  Palpations: Abdomen is soft  Tenderness: There is no abdominal tenderness  Musculoskeletal:         General: Normal range of motion  Cervical back: Normal range of motion  Right lower leg: No edema  Left lower leg: No edema  Feet:    Feet:      Right foot:      Skin integrity: Skin integrity normal       Toenail Condition: Right toenails are normal       Left foot:      Skin integrity: Blister, erythema, warmth and callus present  Toenail Condition: Left toenails are normal       Comments: Erythema noted at 2nd left toe  Large blood blister found on plantar surface of 2nd left toe    Healing wound noted on left plantar surface of foot    Skin:     General: Skin is warm  Coloration: Skin is not jaundiced or pale  Findings: No rash  Neurological:      General: No focal deficit present  Mental Status: He is alert and oriented to person, place, and time  Mental status is at baseline  Cranial Nerves: No cranial nerve deficit  Sensory: No sensory deficit  Motor: No weakness  Psychiatric:         Mood and Affect: Mood normal          Behavior: Behavior normal           Additional Data:     Lab Results:  Results from last 7 days   Lab Units 09/14/22  0443 09/13/22 2034   WBC Thousand/uL  --  4 02*   HEMOGLOBIN g/dL  --  15 1   HEMATOCRIT %  --  45 9   PLATELETS Thousands/uL 51* 62*   NEUTROS PCT %  --  68   LYMPHS PCT %  --  17   MONOS PCT %  --  10   EOS PCT %  --  3     Results from last 7 days   Lab Units 09/13/22 2034   SODIUM mmol/L 138   POTASSIUM mmol/L 4 4   CHLORIDE mmol/L 103   CO2 mmol/L 26   BUN mg/dL 41*   CREATININE mg/dL 1 44*   ANION GAP mmol/L 9   CALCIUM mg/dL 10 1   ALBUMIN g/dL 4 0   TOTAL BILIRUBIN mg/dL 1 01*   ALK PHOS U/L 127*   ALT U/L 45   AST U/L 39   GLUCOSE RANDOM mg/dL 167*             Results from last 7 days   Lab Units 09/12/22  0750   HEMOGLOBIN A1C % 7 7*     Results from last 7 days   Lab Units 09/13/22  2202   LACTIC ACID mmol/L 0 9       Imaging: Personally reviewed the following imaging: xray(s)  XR foot 3+ views LEFT    (Results Pending)       EKG and Other Studies Reviewed on Admission:   · EKG: n/a    ** Please Note: This note has been constructed using a voice recognition system   **

## 2022-09-14 NOTE — PROGRESS NOTES
Vancomycin Assessment    Zayda Dobson is a 74 y o  male who is currently receiving vancomycin IV for skin/soft tissue infection  Relevant clinical data and objective history reviewed:  Creatinine   Date Value Ref Range Status   09/13/2022 1 44 (H) 0 60 - 1 30 mg/dL Final     Comment:     Standardized to IDMS reference method   05/19/2022 0 85 0 60 - 1 30 mg/dL Final     Comment:     Standardized to IDMS reference method   05/18/2022 0 91 0 60 - 1 30 mg/dL Final     Comment:     Standardized to IDMS reference method     /62 (BP Location: Right arm)   Pulse 67   Temp 98 7 °F (37 1 °C) (Oral)   Resp 18   Ht 5' 11" (1 803 m)   SpO2 98%   BMI 34 87 kg/m²   I/O last 3 completed shifts: In: 1050 [IV Piggyback:1050]  Out: -   Lab Results   Component Value Date/Time    BUN 41 (H) 09/13/2022 08:34 PM    WBC 4 02 (L) 09/13/2022 08:34 PM    HGB 15 1 09/13/2022 08:34 PM    HCT 45 9 09/13/2022 08:34 PM    MCV 92 09/13/2022 08:34 PM    PLT 51 (L) 09/14/2022 04:43 AM     Temp Readings from Last 3 Encounters:   09/13/22 98 7 °F (37 1 °C) (Oral)   07/28/22 98 °F (36 7 °C)   07/14/22 98 2 °F (36 8 °C)     Vancomycin Days of Therapy: 1    Assessment/Plan  The patient is currently on vancomycin utilizing scheduled dosing based on adjusted body weight (due to obesity)  Baseline risks associated with therapy include: pre-existing renal impairment  The patient received a loading dose of 1750mg IV overnight and after clinical evaluation will be started on scheduled dosing of 1000mg IV every 12 hours  Pharmacy will also follow closely for s/sx of nephrotoxicity, infusion reactions, and appropriateness of therapy  BMP and CBC will be ordered per protocol  Plan for trough as patient approaches steady state, prior to the 4th dose at approximately 1200 on 9/15  Due to infection severity, will target a trough of 10-15  Pharmacy will continue to follow the patients culture results and clinical progress daily      Nery Malorie Mondragon, Pharmacist

## 2022-09-14 NOTE — ASSESSMENT & PLAN NOTE
Well controlled on HCTZ-triamterene and losartan    Plan  Will hold in the setting of ABILIO  Continue with home diltiazem

## 2022-09-15 LAB
ANION GAP SERPL CALCULATED.3IONS-SCNC: 7 MMOL/L (ref 4–13)
BUN SERPL-MCNC: 21 MG/DL (ref 5–25)
CALCIUM SERPL-MCNC: 8.7 MG/DL (ref 8.4–10.2)
CHLORIDE SERPL-SCNC: 107 MMOL/L (ref 96–108)
CO2 SERPL-SCNC: 23 MMOL/L (ref 21–32)
CREAT SERPL-MCNC: 0.87 MG/DL (ref 0.6–1.3)
CRP SERPL QL: 12.6 MG/L
GFR SERPL CREATININE-BSD FRML MDRD: 88 ML/MIN/1.73SQ M
GLUCOSE SERPL-MCNC: 107 MG/DL (ref 65–140)
GLUCOSE SERPL-MCNC: 110 MG/DL (ref 65–140)
GLUCOSE SERPL-MCNC: 131 MG/DL (ref 65–140)
GLUCOSE SERPL-MCNC: 133 MG/DL (ref 65–140)
GLUCOSE SERPL-MCNC: 77 MG/DL (ref 65–140)
GLUCOSE SERPL-MCNC: 94 MG/DL (ref 65–140)
PLATELET # BLD AUTO: 54 THOUSANDS/UL (ref 149–390)
PMV BLD AUTO: 13.5 FL (ref 8.9–12.7)
POTASSIUM SERPL-SCNC: 3.9 MMOL/L (ref 3.5–5.3)
PROCALCITONIN SERPL-MCNC: 0.28 NG/ML
SODIUM SERPL-SCNC: 137 MMOL/L (ref 135–147)

## 2022-09-15 PROCEDURE — 82948 REAGENT STRIP/BLOOD GLUCOSE: CPT

## 2022-09-15 PROCEDURE — 84145 PROCALCITONIN (PCT): CPT

## 2022-09-15 PROCEDURE — 80048 BASIC METABOLIC PNL TOTAL CA: CPT | Performed by: INTERNAL MEDICINE

## 2022-09-15 PROCEDURE — 85049 AUTOMATED PLATELET COUNT: CPT

## 2022-09-15 PROCEDURE — 86140 C-REACTIVE PROTEIN: CPT

## 2022-09-15 PROCEDURE — 99232 SBSQ HOSP IP/OBS MODERATE 35: CPT | Performed by: INTERNAL MEDICINE

## 2022-09-15 RX ORDER — INSULIN GLARGINE 100 [IU]/ML
30 INJECTION, SOLUTION SUBCUTANEOUS
Status: DISCONTINUED | OUTPATIENT
Start: 2022-09-15 | End: 2022-09-16

## 2022-09-15 RX ORDER — POLYETHYLENE GLYCOL 3350 17 G/17G
17 POWDER, FOR SOLUTION ORAL DAILY PRN
Status: DISCONTINUED | OUTPATIENT
Start: 2022-09-15 | End: 2022-09-16 | Stop reason: HOSPADM

## 2022-09-15 RX ADMIN — HEPARIN SODIUM 5000 UNITS: 5000 INJECTION INTRAVENOUS; SUBCUTANEOUS at 14:30

## 2022-09-15 RX ADMIN — CEFAZOLIN SODIUM 2000 MG: 2 SOLUTION INTRAVENOUS at 11:38

## 2022-09-15 RX ADMIN — HEPARIN SODIUM 5000 UNITS: 5000 INJECTION INTRAVENOUS; SUBCUTANEOUS at 21:54

## 2022-09-15 RX ADMIN — HEPARIN SODIUM 5000 UNITS: 5000 INJECTION INTRAVENOUS; SUBCUTANEOUS at 05:25

## 2022-09-15 RX ADMIN — DILTIAZEM HYDROCHLORIDE 180 MG: 180 CAPSULE, COATED, EXTENDED RELEASE ORAL at 09:03

## 2022-09-15 RX ADMIN — SODIUM CHLORIDE 75 ML/HR: 0.9 INJECTION, SOLUTION INTRAVENOUS at 00:34

## 2022-09-15 RX ADMIN — CEFAZOLIN SODIUM 2000 MG: 2 SOLUTION INTRAVENOUS at 20:42

## 2022-09-15 RX ADMIN — CEFAZOLIN SODIUM 2000 MG: 2 SOLUTION INTRAVENOUS at 02:48

## 2022-09-15 RX ADMIN — INSULIN GLARGINE 20 UNITS: 100 INJECTION, SOLUTION SUBCUTANEOUS at 21:54

## 2022-09-15 NOTE — ASSESSMENT & PLAN NOTE
Chronic in the setting of alcohol abuse  Plts: 62 --> 51--> 54 (09/15) --> 52 (09/16)  Currently on heparin for VTE prophylaxis    - Patient advised to follow up with PCP  - continue to monitor levels

## 2022-09-15 NOTE — ASSESSMENT & PLAN NOTE
Well controlled at home  On HCTZ-triamterene, losartan, diltiazem      Plan  Will hold in the setting of ABILIO  Continue with home diltiazem  Continue to monitor blood pressure

## 2022-09-15 NOTE — PLAN OF CARE
Problem: PAIN - ADULT  Goal: Verbalizes/displays adequate comfort level or baseline comfort level  Description: Interventions:  - Encourage patient to monitor pain and request assistance  - Assess pain using appropriate pain scale  - Administer analgesics based on type and severity of pain and evaluate response  - Implement non-pharmacological measures as appropriate and evaluate response  - Consider cultural and social influences on pain and pain management  - Notify physician/advanced practitioner if interventions unsuccessful or patient reports new pain  Outcome: Progressing     Problem: INFECTION - ADULT  Goal: Absence or prevention of progression during hospitalization  Description: INTERVENTIONS:  - Assess and monitor for signs and symptoms of infection  - Monitor lab/diagnostic results  - Monitor all insertion sites, i e  indwelling lines, tubes, and drains  - Monitor endotracheal if appropriate and nasal secretions for changes in amount and color  - [place] appropriate cooling/warming therapies per order  - Administer medications as ordered  - Instruct and encourage patient and family to use good hand hygiene technique  - Identify and instruct in appropriate isolation precautions for identified infection/condition  Outcome: Progressing  Goal: Absence of fever/infection during neutropenic period  Description: INTERVENTIONS:  - Monitor WBC    Outcome: Progressing     Problem: SAFETY ADULT  Goal: Patient will remain free of falls  Description: INTERVENTIONS:  - Educate patient/family on patient safety including physical limitations  - Instruct patient to call for assistance with activity   - Consult OT/PT to assist with strengthening/mobility   - Keep Call bell within reach  - Keep bed low and locked with side rails adjusted as appropriate  - Keep care items and personal belongings within reach  - Initiate and maintain comfort rounds  - Make Fall Risk Sign visible to staff  - Offer Toileting every  Hours, in advance of need  - Initiate/Maintain alarm  - Obtain necessary fall risk management equipment  - Apply yellow socks and bracelet for high fall risk patients  - Consider moving patient to room near nurses station  Outcome: Progressing  Goal: Maintain or return to baseline ADL function  Description: INTERVENTIONS:  -  Assess patient's ability to carry out ADLs; assess patient's baseline for ADL function and identify physical deficits which impact ability to perform ADLs (bathing, care of mouth/teeth, toileting, grooming, dressing, etc )  - Assess/evaluate cause of self-care deficits   - Assess range of motion  - Assess patient's mobility; develop plan if impaired  - Assess patient's need for assistive devices and provide as appropriate  - Encourage maximum independence but intervene and supervise when necessary  - Involve family in performance of ADLs  - Assess for home care needs following discharge   - Consider OT consult to assist with ADL evaluation and planning for discharge  - Provide patient education as appropriate  Outcome: Progressing  Goal: Maintains/Returns to pre admission functional level  Description: INTERVENTIONS:  - Perform BMAT or MOVE assessment daily    - Set and communicate daily mobility goal to care team and patient/family/caregiver  - Collaborate with rehabilitation services on mobility goals if consulted  - Perform Range of Motion  times a day  - Reposition patient every  hours    - Dangle patient  times a day  - Stand patient  times a day  - Ambulate patient  times a day  - Out of bed to chair  times a day   - Out of bed for meal times a day  - Out of bed for toileting  - Record patient progress and toleration of activity level   Outcome: Progressing     Problem: DISCHARGE PLANNING  Goal: Discharge to home or other facility with appropriate resources  Description: INTERVENTIONS:  - Identify barriers to discharge wtient and caregiver  - Arrange for needed discharge resources and transportation as appropriate  - Identify discharge learning needs (meds, wound care, etc )  - Arrange for interpretive services to assist at discharge as needed  - Refer to Case Management Department for coordinating discharge planning if the patient needs post-hospital services based on physician/advanced practitioner order or complex needs related to functional status, cognitive ability, or social support system  Outcome: Progressing     Problem: Knowledge Deficit  Goal: Patient/family/caregiver demonstrates understanding of disease process, treatment plan, medications, and discharge instructions  Description: Complete learning assessment and assess knowledge base    Interventions:  - Provide teaching at level of understanding  - Provide teaching via preferred learning methods  Outcome: Progressing

## 2022-09-15 NOTE — DISCHARGE SUMMARY
[facility]  Discharge- Gina Lion 1955, 74 y o  male MRN: [id number]  Unit/Bed#: W -86 Encounter: [id number]  Primary Care Provider: Chandler Greenberg MD   Date and time admitted to hospital: 9/13/2022  9:41 PM    * Cellulitis  Assessment & Plan  POA: Patient presents with left sided foot wound under the second toe  Follows up with Dr Jose Manuel Jackson for podiatry and wound care  Usually wears tight fitting diabetic shoes but has worn loose fitting sneakers for the past week that resulted in the formation of a blister  Former healing blister present on the plantar surface of the anterior left foot that is immediately adjacent to new blood filled blister under second toe  He was seen in May 2022 for a similar problem for which he was discharged on 7 day course of keflex     -Patient continues to denies any fever, pain, foul smell or drainage    Second left toe appears erythematous and inflamed and does not extend to the foot  - Xray of left foot unremarkable for osteomyelitis  - podiatry debrided wound yesterday    Plan  - podiatry recs:  No need for surgery or MRI at this time   - IV antibiotics for 48 hours: Cefazolin, then transition to p o   Antibiotics   - dressing changed this morning 09/16 with betadine swab, gauze, kerlix and ACE   - continue daily dressing changes at home with dermagran and gauze starting tomorrow   - continue partial weightbearing to left heel  - discharge on PO antibiotics - Keflex 500 mg q6 hrs x 8 days  - follow-up wound cultures- 1+ Klebsiella oxytoca  - follow-up blood cultures - no growth at 48 hrs        Leukopenia  Assessment & Plan  Recent Labs     09/13/22 2034   WBC 4 02*     Leukopenia in the setting of chronic alcohol use and liver cirrhosis    Plan  - Patient advised to follow up with PCP     Thrombocytopenia ([facility] [address] )  Assessment & Plan  Chronic in the setting of alcohol abuse  Plts: 62 --> 51--> 54 (09/15) --> 52 (09/16)  Currently on heparin for VTE prophylaxis    - Patient advised to follow up with PCP  - continue to monitor levels          Alcoholic cirrhosis ([address] )  Assessment & Plan  Patient has a history of consuming 3-4 rum drinks nightly but has been trying to cut back recently  He says last drink was about one month ago  CT of the abdomen in 2020 showed findings suggesting portal hypertension, including splenomegaly, varices, prominence of the portal vein, etc          Plan:  Education about excessive alcohol intake provided      ABILIO (acute kidney injury) [facility])  Assessment & Plan  Recent Labs     09/13/22  2034 09/14/22  1518 09/15/22  0424   CREATININE 1 44* 0 97 0 87   EGFR 49 79 88     Estimated Creatinine Clearance: 103 9 mL/min (by C-G formula based on SCr of 0 87 mg/dL)  Patient is back to around baseline creatinine, approximately 0 9 after IV fluids  On losartan and triamterene-hctz for HTN    Plan:  Discontinued IV fluids  Morning BMP  Continue to Hold ACE/ARB and diuretics, avoid nephrotoxins      Benign essential HTN  Assessment & Plan  Well controlled at home  On HCTZ-triamterene, losartan, diltiazem      Plan  Will hold in the setting of ABILIO  Continue with home diltiazem  Continue to monitor blood pressure    Type 2 diabetes mellitus [facility])  Assessment & Plan  Lab Results   Component Value Date    HGBA1C 7 7 (H) 09/12/2022       Recent Labs     09/15/22  1619 09/15/22  2153 09/16/22  0733 09/16/22  1132   POCGLU 110 133 99 128       Blood Sugar Average: Last 72 hrs:     Patient says morning glucose this morning was in the 90s and he has been working to control his diabetes  Patient reported he is on Lantus 40 mg, glimepiride and Jardiance at home  Follows up with ophthalmology  Patient only took 20 units of his HS insulin yesterday      Plan  - outpatient follow-up with PCP  - SSI, fingerstick glucose checks    Alcohol abuse  Assessment & Plan  Patient says he has been trying to cut back and reports his last drink was approximately one month ago  Education about excessive drinking provided          Medical Problems             Resolved Problems  Date Reviewed: 9/15/2022   None               Discharging Resident: Trent Opitz, DO  Discharging Attending: No att  providers found  PCP: Huong Sahu MD  Admission Date:   Admission Orders (From admission, onward)     Ordered        09/14/22 1459  Inpatient Admission  Once            09/13/22 2331  Place in Observation  Once                      Discharge Date: 09/16/22    Consultations During Hospital Stay:  · Podiatry     Procedures Performed:   · Blister deroofing  · Last dressing change 09/16/22    Significant Findings / Test Results:   · Left foot x-ray:  No radiographic evidence of osteomyelitis, no acute fracture or dislocation  · Wound culture:  Klebsiella oxytoca  · Hgb A1c 7 7 on 9/12/22    Incidental Findings:   · Acute kidney injury:  Cr 2 0 POA --> 0 79 on day of discharge s/p IV fluids  · Left foot x-ray:  Chronic findings of metatarsophalangeal joint osteoarthritis - severe in 1st joint, moderate in 2nd through 5th joints; 2nd toe metatarsophalangeal joint subluxation     Test Results Pending at Discharge (will require follow up): · None     Outpatient Tests Requested:  · None    Complications:  None    Reason for Admission:  Left 2nd digit cellulitis, ABILIO    Hospital Course:   Cleo Cole is a 74 y o  male with PMHx of DM2, cirrhosis with thrombocytopenia/leukopenia, and HTN who originally presented to the hospital on 9/13/2022 due to swelling and erythema of the 2nd left digit  He first noted a growing blister on the plantar aspect of the left toe beginning Sunday 09/11/22 with some pink drainage, no foul order  He regularly sees Dr Kiko Blancas, outpatient podiatry, for wound care of left foot calluses  However, on 09/13/2022 patient came in to the ED due to swelling, erythema of the 2nd digit of the left foot  He denied any toe pain throughout his stay    He reports he usually wears tight fitting diabetic shoes, but has been wearing loose issues for past week and went deep sea fishing earlier on the day of admission  In the ED, patient was started on vancomycin and cefepime  Podiatry was consulted and de-roofed the blister  Per Podiatry, no acute podiatric surgical intervention was warranted  X-ray was unremarkable for osteomyelitis  Wound cultures grew Klebsiella oxytoca  Per chart review, patient does not have a history of MRSA or Pseudomonas infections  Thus, patient was started on cefazolin on 09/14/2022 for a total duration of 2 days  Patient transitioned to p o  Antibiotics Keflex 500 mg q 6 hours x 8 days upon discharge  Patient was incidentally found to have an ABILIO with Cr 2 0 on admission  He was started on IV fluids, BMP was monitored, and home blood pressure medications losartan and hydrochlorothiazide-triamterene were held  Cr on day of discharge was 0 79 (pt's baseline approx 0 9)  Patient was also initially started on his home Lantus 40 units HS  However, was decreased to 30 units HS on 09/15/2022 due to slightly low blood glucose levels in the hospital   That night patient only took 20 units of HS insulin  See assessment and plan above for AM blood glucose levels  Per podiatry recommendations, patient instructed to continue with daily dressing changes at home using dermagran and gauze, and to continue partial weight-bearing on left heel  Patient will follow-up with PCP and Podiatry  Please see above list of diagnoses and related plan for additional information  Condition at Discharge: stable    Discharge Day Visit / Exam:   Subjective:  Patient seen and evaluated at bedside, in no acute distress  No acute events overnight   Patient denies fever/chills, chest pain, palpitations, shortness of breath, lightheadedness/dizzinessnausea, vomiting/diarrhea/constipation  Vitals: Blood Pressure: 122/53 (09/16/22 0735)  Pulse: (!) 54 (09/16/22 0735)  Temperature: 98 1 °F (36 7 °C) (09/16/22 0735)  Temp Source: Oral (09/15/22 2122)  Respirations: 16 (09/16/22 0735)  Height: 5' 11" (180 3 cm) (09/13/22 2025)  SpO2: 98 % (09/16/22 0735)  Exam:   Physical Exam  Vitals and nursing note reviewed  Constitutional:       Appearance: He is well-developed  He is not ill-appearing or diaphoretic  HENT:      Head: Normocephalic and atraumatic  Nose: Nose normal    Eyes:      Conjunctiva/sclera: Conjunctivae normal    Cardiovascular:      Rate and Rhythm: Normal rate and regular rhythm  Pulmonary:      Effort: Pulmonary effort is normal  No respiratory distress  Breath sounds: Normal breath sounds  Abdominal:      General: Bowel sounds are normal       Palpations: Abdomen is soft  Tenderness: There is no abdominal tenderness  Musculoskeletal:      Cervical back: Neck supple  Feet:      Left foot:      Skin integrity: Erythema present  Comments: Swelling improving from yesterday, nontender, no purulent discharge  Skin:     General: Skin is warm and dry  Neurological:      Mental Status: He is alert and oriented to person, place, and time  Psychiatric:         Mood and Affect: Mood normal          Behavior: Behavior normal           Discussion with Family: Patient declined call to   Discharge instructions/Information to patient and family:   See after visit summary for information provided to patient and family  Provisions for Follow-Up Care:  See after visit summary for information related to follow-up care and any pertinent home health orders  Disposition:   Home    Planned Readmission: No    Discharge Medications:  See after visit summary for reconciled discharge medications provided to patient and/or family        **Please Note: This note may have been constructed using a voice recognition system**

## 2022-09-15 NOTE — ASSESSMENT & PLAN NOTE
POA: Patient presents with left sided foot wound under the second toe  Follows up with Dr Raz Gupta for podiatry and wound care  Usually wears tight fitting diabetic shoes but has worn loose fitting sneakers for the past week that resulted in the formation of a blister  Former healing blister present on the plantar surface of the anterior left foot that is immediately adjacent to new blood filled blister under second toe  He was seen in May 2022 for a similar problem for which he was discharged on 7 day course of keflex     -Patient continues to denies any fever, pain, foul smell or drainage    Second left toe appears erythematous and inflamed and does not extend to the foot  - Xray of left foot unremarkable for osteomyelitis  - podiatry debrided wound yesterday    Plan  - podiatry recs:  No need for surgery or MRI at this time   - IV antibiotics for 48 hours: Cefazolin, then transition to p o   Antibiotics   - dressing changed this morning 09/16 with betadine swab, gauze, kerlix and ACE   - continue daily dressing changes at home with dermagran and gauze starting tomorrow   - continue partial weightbearing to left heel  - discharge on PO antibiotics - Keflex 500 mg q6 hrs x 8 days  - follow-up wound cultures- 1+ Klebsiella oxytoca  - follow-up blood cultures - no growth at 48 hrs

## 2022-09-15 NOTE — PROGRESS NOTES
[facility]  Progress Note Brittany Richardson 1955, 74 y o  male MRN: [id number]  Unit/Bed#: W -03 Encounter: [id number]  Primary Care Provider: Martinez Phan MD   Date and time admitted to hospital: 9/13/2022  9:41 PM    * Cellulitis  Assessment & Plan  POA: Patient presents with left sided foot wound under the second toe  Follows up with Dr Melissa Diana for podiatry and wound care  Usually wears tight fitting diabetic shoes but has worn loose fitting sneakers for the past week that resulted in the formation of a blister  Former healing blister present on the plantar surface of the anterior left foot that is immediately adjacent to new blood filled blister under second toe  He was seen in May 2022 for a similar problem for which he was discharged on 7 day course of keflex     -Patient continues to denies any fever, pain, foul smell or drainage    Second left toe appears erythematous and inflamed and does not extend to the foot  - Xray of left foot unremarkable for osteomyelitis  - podiatry debrided wound yesterday    Plan  - podiatry recs:  No need for surgery or MRI at this time   - IV antibiotics for 48 hours: Cefepime 2 g IV, Vancomycin 1750 mg IV  - p o  Antibiotics starting tomorrow  - follow-up wound cultures- 1+ Klebsiella oxytoca  - follow-up blood cultures - no growth at 24 hrs        Leukopenia  Assessment & Plan  Recent Labs     09/13/22 2034   WBC 4 02*     Leukopenia in the setting of chronic alcohol use and liver cirrhosis    Plan  - Patient advised to follow up with PCP     Thrombocytopenia ([facility] )  Assessment & Plan  Chronic in the setting of alcohol abuse  Plts: 62 --> 51--> 54 (09/15)   Currently on heparin for VTE prophylaxis    - Patient advised to follow up with PCP  - continue to monitor levels          Alcoholic cirrhosis ([facility] )  Assessment & Plan  Patient has a history of consuming 3-4 rum drinks nightly but has been trying to cut back recently    He says last drink was about one month ago  CT of the abdomen in 2020 showed findings suggesting portal hypertension, including splenomegaly, varices, prominence of the portal vein, etc          Plan:  Education about excessive alcohol intake provided      ABILIO (acute kidney injury) [facility])  Assessment & Plan  Recent Labs     09/13/22 2034 09/14/22  1518 09/15/22  0424   CREATININE 1 44* 0 97 0 87   EGFR 49 79 88     Estimated Creatinine Clearance: 103 9 mL/min (by C-G formula based on SCr of 0 87 mg/dL)  Patient is back to around baseline creatinine, approximately 0 9 after IV fluids  On losartan and triamterene-hctz for HTN    Plan:  Discontinued IV fluids  Morning BMP  Continue to Hold ACE/ARB and diuretics, avoid nephrotoxins      Benign essential HTN  Assessment & Plan  Well controlled at home  On HCTZ-triamterene, losartan, diltiazem      Plan  Will hold in the setting of ABILIO  Continue with home diltiazem  Continue to monitor blood pressure    Type 2 diabetes mellitus [facility])  Assessment & Plan  Lab Results   Component Value Date    HGBA1C 7 7 (H) 09/12/2022       Recent Labs     09/14/22  2209 09/15/22  0552 09/15/22  0743 09/15/22  1113   POCGLU 98 94 107 131       Blood Sugar Average: Last 72 hrs:     Patient says morning glucose this morning was in the 90s and he has been working to control his diabetes  Patient reported he is on Lantus 40 mg, glimepiride and Jardiance at home  Follows up with ophthalmology  BGs slightly on lower side this morning as noted above  Plan  - decrease home Lantus from 40 to 30 units HS  - SSI, fingerstick glucose checks    Alcohol abuse  Assessment & Plan  Patient says he has been trying to cut back and reports his last drink was approximately one month ago  Education about excessive drinking provided            VTE Pharmacologic Prophylaxis: VTE Score: 6 High Risk (Score >/= 5) - Pharmacological DVT Prophylaxis Ordered: heparin  Sequential Compression Devices Ordered      Patient Centered Rounds: I performed bedside rounds with nursing staff today  Discussions with Specialists or Other Care Team Provider: None    Education and Discussions with Family / Patient: Patient declined call to   Current Length of Stay: 1 day(s)  Current Patient Status: Inpatient   Discharge Plan: Anticipate discharge tomorrow to home  Code Status: Level 1 - Full Code    Subjective:   Patient seen and evaluated at bedside, in no acute distress  No acute events overnight  Patient reports he is doing good this morning, has an adequate diet  He has been watching what he eats both at home and while in the hospital   He noted his blood glucose levels were slightly low this morning and refused his insulin  He states he got about 3-4 hours of sleep last night and is feeling tired today, though is normal for him  Patient does not know any improvement in swelling of the left 2nd toe but continues to deny pain, drainage, foul discharge/pus  He denies feeling constipated, though has noted he has not had a bowel movement in 36 hours and typically has a bowel movement every morning regularly  Patient denies fevers/chills, chest pain, palpitations, shortness of breath, nausea/vomiting/diarrhea/constipation, lightheadedness/dizziness  Objective:     Vitals:   Temp (24hrs), Av 8 °F (36 6 °C), Min:97 6 °F (36 4 °C), Max:98 °F (36 7 °C)    Temp:  [97 6 °F (36 4 °C)-98 °F (36 7 °C)] 98 °F (36 7 °C)  HR:  [70-73] 70  Resp:  [16-18] 16  BP: (115-123)/(60-66) 115/66  SpO2:  [98 %] 98 %  Body mass index is 34 87 kg/m²  Input and Output Summary (last 24 hours): Intake/Output Summary (Last 24 hours) at 9/15/2022 1534  Last data filed at 9/15/2022 0924  Gross per 24 hour   Intake 1831 25 ml   Output --   Net 1831 25 ml       Physical Exam:   Physical Exam  Vitals and nursing note reviewed  Constitutional:       Appearance: He is well-developed  He is not ill-appearing or diaphoretic     HENT:      Head: Normocephalic and atraumatic  Nose: Nose normal    Eyes:      Conjunctiva/sclera: Conjunctivae normal    Cardiovascular:      Rate and Rhythm: Normal rate and regular rhythm  Pulmonary:      Effort: Pulmonary effort is normal  No respiratory distress  Breath sounds: Normal breath sounds  Abdominal:      General: Bowel sounds are normal  There is no distension  Palpations: Abdomen is soft  Tenderness: There is no abdominal tenderness  There is no guarding or rebound  Musculoskeletal:      Cervical back: Neck supple  Feet:      Left foot:      Skin integrity: Erythema and warmth present  Comments: Swelling unimproved from yesterday, nontender  Skin:     General: Skin is warm and dry  Neurological:      Mental Status: He is alert and oriented to person, place, and time  Psychiatric:         Mood and Affect: Mood normal          Behavior: Behavior normal           Additional Data:     Labs:  Results from last 7 days   Lab Units 09/15/22  0936 09/14/22  0443 09/13/22  2034   WBC Thousand/uL  --   --  4 02*   HEMOGLOBIN g/dL  --   --  15 1   HEMATOCRIT %  --   --  45 9   PLATELETS Thousands/uL 54*   < > 62*   NEUTROS PCT %  --   --  68   LYMPHS PCT %  --   --  17   MONOS PCT %  --   --  10   EOS PCT %  --   --  3    < > = values in this interval not displayed  Results from last 7 days   Lab Units 09/15/22  0424 09/14/22  1518 09/13/22  2034   SODIUM mmol/L 137   < > 138   POTASSIUM mmol/L 3 9   < > 4 4   CHLORIDE mmol/L 107   < > 103   CO2 mmol/L 23   < > 26   BUN mg/dL 21   < > 41*   CREATININE mg/dL 0 87   < > 1 44*   ANION GAP mmol/L 7   < > 9   CALCIUM mg/dL 8 7   < > 10 1   ALBUMIN g/dL  --   --  4 0   TOTAL BILIRUBIN mg/dL  --   --  1 01*   ALK PHOS U/L  --   --  127*   ALT U/L  --   --  45   AST U/L  --   --  39   GLUCOSE RANDOM mg/dL 77   < > 167*    < > = values in this interval not displayed           Results from last 7 days   Lab Units 09/15/22  1113 09/15/22  4957 09/15/22  0552 09/14/22  2209 09/14/22  1651 09/14/22  1255   POC GLUCOSE mg/dl 131 107 94 98 114 119     Results from last 7 days   Lab Units 09/12/22  0750   HEMOGLOBIN A1C % 7 7*     Results from last 7 days   Lab Units 09/15/22  0424 09/13/22  2202   LACTIC ACID mmol/L  --  0 9   PROCALCITONIN ng/ml 0 28*  --        Lines/Drains:  Invasive Devices  Report    Peripheral Intravenous Line  Duration           Peripheral IV 09/14/22 Left Forearm 1 day                      Imaging: Reviewed radiology reports from this admission including: xray(s)    Recent Cultures (last 7 days):   Results from last 7 days   Lab Units 09/14/22  1031 09/13/22  2344   BLOOD CULTURE   --  No Growth at 24 hrs  No Growth at 24 hrs  GRAM STAIN RESULT  No Polys or Bacteria seen  --    WOUND CULTURE  1+ Growth of Klebsiella oxytoca*  --        Last 24 Hours Medication List:   Current Facility-Administered Medications   Medication Dose Route Frequency Provider Last Rate    acetaminophen  650 mg Oral Q6H PRN Blaire Garza DO      cefazolin  2,000 mg Intravenous Q8H Mariela Bishop MD 2,000 mg (09/15/22 1138)    diltiazem  180 mg Oral Daily Breanna Montes MD      heparin (porcine)  5,000 Units Subcutaneous [facility] Breanna Montes MD      insulin glargine  30 Units Subcutaneous HS Mariela Bishop MD      insulin lispro  1-6 Units Subcutaneous TID [facility] Mariela Bishop MD      polyethylene glycol  17 g Oral Daily PRN Beverly Wong DO          Today, Patient Was Seen By: Beverly Wong DO    **Please Note: This note may have been constructed using a voice recognition system  **

## 2022-09-16 VITALS
HEART RATE: 54 BPM | DIASTOLIC BLOOD PRESSURE: 53 MMHG | SYSTOLIC BLOOD PRESSURE: 122 MMHG | OXYGEN SATURATION: 98 % | HEIGHT: 71 IN | TEMPERATURE: 98.1 F | RESPIRATION RATE: 16 BRPM | BODY MASS INDEX: 34.87 KG/M2

## 2022-09-16 LAB
ANION GAP SERPL CALCULATED.3IONS-SCNC: 7 MMOL/L (ref 4–13)
BACTERIA WND AEROBE CULT: ABNORMAL
BACTERIA WND AEROBE CULT: ABNORMAL
BUN SERPL-MCNC: 17 MG/DL (ref 5–25)
CALCIUM SERPL-MCNC: 9 MG/DL (ref 8.4–10.2)
CHLORIDE SERPL-SCNC: 108 MMOL/L (ref 96–108)
CO2 SERPL-SCNC: 23 MMOL/L (ref 21–32)
CREAT SERPL-MCNC: 0.79 MG/DL (ref 0.6–1.3)
GFR SERPL CREATININE-BSD FRML MDRD: 92 ML/MIN/1.73SQ M
GLUCOSE SERPL-MCNC: 128 MG/DL (ref 65–140)
GLUCOSE SERPL-MCNC: 81 MG/DL (ref 65–140)
GLUCOSE SERPL-MCNC: 99 MG/DL (ref 65–140)
GRAM STN SPEC: ABNORMAL
PLATELET # BLD AUTO: 52 THOUSANDS/UL (ref 149–390)
PMV BLD AUTO: 12.5 FL (ref 8.9–12.7)
POTASSIUM SERPL-SCNC: 4 MMOL/L (ref 3.5–5.3)
SODIUM SERPL-SCNC: 138 MMOL/L (ref 135–147)

## 2022-09-16 PROCEDURE — 99239 HOSP IP/OBS DSCHRG MGMT >30: CPT | Performed by: INTERNAL MEDICINE

## 2022-09-16 PROCEDURE — 85049 AUTOMATED PLATELET COUNT: CPT | Performed by: INTERNAL MEDICINE

## 2022-09-16 PROCEDURE — 82948 REAGENT STRIP/BLOOD GLUCOSE: CPT

## 2022-09-16 PROCEDURE — 80048 BASIC METABOLIC PNL TOTAL CA: CPT | Performed by: INTERNAL MEDICINE

## 2022-09-16 PROCEDURE — 99232 SBSQ HOSP IP/OBS MODERATE 35: CPT | Performed by: PODIATRIST

## 2022-09-16 RX ORDER — INSULIN GLARGINE 100 [IU]/ML
40 INJECTION, SOLUTION SUBCUTANEOUS
Start: 2022-09-16

## 2022-09-16 RX ORDER — CEPHALEXIN 500 MG/1
500 CAPSULE ORAL EVERY 6 HOURS SCHEDULED
Qty: 32 CAPSULE | Refills: 0 | Status: SHIPPED | OUTPATIENT
Start: 2022-09-16 | End: 2022-09-24

## 2022-09-16 RX ORDER — INSULIN GLARGINE 100 [IU]/ML
20 INJECTION, SOLUTION SUBCUTANEOUS
Status: DISCONTINUED | OUTPATIENT
Start: 2022-09-16 | End: 2022-09-16 | Stop reason: HOSPADM

## 2022-09-16 RX ADMIN — CEFAZOLIN SODIUM 2000 MG: 2 SOLUTION INTRAVENOUS at 04:00

## 2022-09-16 RX ADMIN — DILTIAZEM HYDROCHLORIDE 180 MG: 180 CAPSULE, COATED, EXTENDED RELEASE ORAL at 08:00

## 2022-09-16 RX ADMIN — HEPARIN SODIUM 5000 UNITS: 5000 INJECTION INTRAVENOUS; SUBCUTANEOUS at 05:05

## 2022-09-16 NOTE — DISCHARGE INSTR - AVS FIRST PAGE
Dear Linette Velazquez,     It was our pleasure to care for you here at [facility]  It is our hope that we were always able to exceed the expected standards for your care during your stay  You were hospitalized due to toe cellulitis  You were cared for on the W 4th floor by Emelyn Lambert DO under the service of Sara Cornejo DO with the [facility] who covers for your primary care physician (PCP), Fabien Mckeon MD, while you were hospitalized  If you have any questions or concerns related to this hospitalization, you may contact us at [phone]  For follow up as well as any medication refills, we recommend that you follow up with your primary care physician  A registered nurse will reach out to you by phone within a few days after your discharge to answer any additional questions that you may have after going home  However, at this time we provide for you here, the most important instructions / recommendations at discharge:     Notable Medication Adjustments -   Keflex 500 mg every 6 hours for 8 days upon discharge  Testing Required after Discharge -   None  Important follow up information -   Continue partial weight-bearing to left heel  Continue daily dressing changes at home with dermagran and gauze starting tomorrow 09/17/22  Please follow up with PCP within 1 week  Please follow up with outpatient podiatrist in 1 week  Other Instructions -  Please report back to the emergency room if infection worsens or persists  Monitor home blood glucose levels  If glucose dropped below 80, you may cut your insulin dose in half for that day  Please review this entire after visit summary as additional general instructions including medication list, appointments, activity, diet, any pertinent wound care, and other additional recommendations from your care team that may be provided for you        Sincerely,     Emelyn Lambert DO

## 2022-09-16 NOTE — PROGRESS NOTES
Progress Note - Podiatry  Linette Velazquez 74 y o  male MRN: [id number]  Unit/Bed#: W -01 Encounter: [id number]    Assessment  1  Type 2 Diabetes with polyneuropathy  2  Left foot neuropathic ulceration with blister  3  Left 2nd digit cellulitis - improving    Plan:  1  Patient seen and examined at bedside this morning  2  Cellulitis still noted to left 2nd digit,slightly improved since admission  3  Continue antibiotics, can be discharged on PO   4  Dressings changed this morning with betadine swab, gauze, kerlix and ACE  5  Instructed to continue partial weightbearing to left heel  6  Can continue daily dressing changes at home with dermagran and gauze starting tomorrow  7  Stable for discharge from podiatry standpoint  Will call to schedule follow up with Dr Anca Umaña at the wound center next week      Subjective/Objective   Chief Complaint:   Chief Complaint   Patient presents with    Foot Pain     Pt has diabetic ulcer on underside of  L foot near 2nd/3rd toe  Appointment tomorrow with podiatry, wanted to be seen in ER for increasing pain, swelling  Area is warm to touch  Denies fevers  Subjective: 74 y o  y/o male seen and evaluated at bedside, sitting in chair eating breakfast  He denies any pedal pain at this time and denies any adverse reaction to IV Abx  No acute events overnight  Denies N/F/V/SOB/CP/cough/diarrhea/constipation  Blood pressure 122/53, pulse (!) 54, temperature 98 1 °F (36 7 °C), resp  rate 16, height 5' 11" (1 803 m), SpO2 98 %  ,Body mass index is 34 87 kg/m²      Lab Results   Component Value Date    WBC 4 02 (L) 09/13/2022    HGB 15 1 09/13/2022    HCT 45 9 09/13/2022    MCV 92 09/13/2022    PLT 52 (L) 09/16/2022     Lab Results   Component Value Date    CALCIUM 9 0 09/16/2022    K 4 0 09/16/2022    CO2 23 09/16/2022     09/16/2022    BUN 17 09/16/2022    CREATININE 0 79 09/16/2022         Invasive Devices  Report    Peripheral Intravenous Line  Duration           Peripheral IV 09/14/22 Left Forearm 1 day                Physical Exam:   General: alert, cooperative and no distress  Vascular: DP and PT pulses palpable to LLE  CFT within normal limits  Skin temp warm to touch LLE  Dermatology: resolving cellulitis noted to left 2nd digit, noted to distal half of digit  Deroofed blister site intact  Mild serosanguinous drainage from the area  Focal pinpoint neuropathic ulceration noted to plantar left 2nd metatarsal head    Neurological: Light touch diminished b/l  Protective sensation grossly diminished b/l  MSK: Subtle pes cavus foot type b/l  No pain or tenderness to palpation of left forefoot  Lab, Imaging and other studies:   Results from last 7 days   Lab Units 09/14/22  1031   GRAM STAIN RESULT  No Polys or Bacteria seen       Results from last 7 days   Lab Units 09/13/22  2344   BLOOD CULTURE  No Growth at 48 hrs  No Growth at 48 hrs  Results from last 7 days   Lab Units 09/14/22  1031   WOUND CULTURE  1+ Growth of Klebsiella oxytoca*     Imaging: I have personally reviewed pertinent films in PACS          Portions of the record may have been created with voice recognition software  Occasional wrong word or "sound a like" substitutions may have occurred due to the inherent limitations of voice recognition software  Read the chart carefully and recognize, using context, where substitutions have occurred

## 2022-09-16 NOTE — PLAN OF CARE
Problem: PAIN - ADULT  Goal: Verbalizes/displays adequate comfort level or baseline comfort level  Description: Interventions:  - Encourage patient to monitor pain and request assistance  - Assess pain using appropriate pain scale  - Administer analgesics based on type and severity of pain and evaluate response  - Implement non-pharmacological measures as appropriate and evaluate response  - Consider cultural and social influences on pain and pain management  - Notify physician/advanced practitioner if interventions unsuccessful or patient reports new pain  Outcome: Progressing     Problem: INFECTION - ADULT  Goal: Absence or prevention of progression during hospitalization  Description: INTERVENTIONS:  - Assess and monitor for signs and symptoms of infection  - Monitor lab/diagnostic results  - Monitor all insertion sites, i e  indwelling lines, tubes, and drains  - Monitor endotracheal if appropriate and nasal secretions for changes in amount and color  - [place] appropriate cooling/warming therapies per order  - Administer medications as ordered  - Instruct and encourage patient and family to use good hand hygiene technique  - Identify and instruct in appropriate isolation precautions for identified infection/condition  Outcome: Progressing  Goal: Absence of fever/infection during neutropenic period  Description: INTERVENTIONS:  - Monitor WBC    Outcome: Progressing     Problem: SAFETY ADULT  Goal: Patient will remain free of falls  Description: INTERVENTIONS:  - Educate patient/family on patient safety including physical limitations  - Instruct patient to call for assistance with activity   - Consult OT/PT to assist with strengthening/mobility   - Keep Call bell within reach  - Keep bed low and locked with side rails adjusted as appropriate  - Keep care items and personal belongings within reach  - Initiate and maintain comfort rounds  - Make Fall Risk Sign visible to staff  - Offer Toileting every  Hours, in advance of need  - Initiate/Maintain alarm  - Obtain necessary fall risk management equipment  - Apply yellow socks and bracelet for high fall risk patients  - Consider moving patient to room near nurses station  Outcome: Progressing  Goal: Maintain or return to baseline ADL function  Description: INTERVENTIONS:  -  Assess patient's ability to carry out ADLs; assess patient's baseline for ADL function and identify physical deficits which impact ability to perform ADLs (bathing, care of mouth/teeth, toileting, grooming, dressing, etc )  - Assess/evaluate cause of self-care deficits   - Assess range of motion  - Assess patient's mobility; develop plan if impaired  - Assess patient's need for assistive devices and provide as appropriate  - Encourage maximum independence but intervene and supervise when necessary  - Involve family in performance of ADLs  - Assess for home care needs following discharge   - Consider OT consult to assist with ADL evaluation and planning for discharge  - Provide patient education as appropriate  Outcome: Progressing  Goal: Maintains/Returns to pre admission functional level  Description: INTERVENTIONS:  - Perform BMAT or MOVE assessment daily    - Set and communicate daily mobility goal to care team and patient/family/caregiver  - Collaborate with rehabilitation services on mobility goals if consulted  - Perform Range of Motion  times a day  - Reposition patient every  hours    - Dangle patient  times a day  - Stand patient  times a day  - Ambulate patient  times a day  - Out of bed to chair  times a day   - Out of bed for meal times a day  - Out of bed for toileting  - Record patient progress and toleration of activity level   Outcome: Progressing     Problem: DISCHARGE PLANNING  Goal: Discharge to home or other facility with appropriate resources  Description: INTERVENTIONS:  - Identify barriers to discharge wtient and caregiver  - Arrange for needed discharge resources and transportation as appropriate  - Identify discharge learning needs (meds, wound care, etc )  - Arrange for interpretive services to assist at discharge as needed  - Refer to Case Management Department for coordinating discharge planning if the patient needs post-hospital services based on physician/advanced practitioner order or complex needs related to functional status, cognitive ability, or social support system  Outcome: Progressing     Problem: Knowledge Deficit  Goal: Patient/family/caregiver demonstrates understanding of disease process, treatment plan, medications, and discharge instructions  Description: Complete learning assessment and assess knowledge base    Interventions:  - Provide teaching at level of understanding  - Provide teaching via preferred learning methods  Outcome: Progressing

## 2022-09-16 NOTE — DISCHARGE INSTRUCTIONS
Cellulitis   WHAT YOU NEED TO KNOW:   Cellulitis is a skin infection caused by bacteria  Cellulitis is common and can become severe  Cellulitis usually appears on the lower legs  It can also appear on the arms, face, and other areas  Cellulitis develops when bacteria enter a crack or break in your skin, such as a scratch, bite, or cut  DISCHARGE INSTRUCTIONS:   Return to the emergency department if:   Your wound gets larger and more painful  You feel a crackling under your skin when you touch it  You have purple dots or bumps on your skin, or you see bleeding under your skin  You see red streaks coming from the infected area  Call your doctor if:   The red, warm, swollen area gets larger  Your fever or pain does not go away or gets worse  The area does not get smaller after 3 days of antibiotics  You have questions or concerns about your condition or care  Medicines: You should start to see improvement in 3 days  If cellulitis is not treated, the infection can spread through your body and become life-threatening  You may need any of the following medicines:  Antibiotics  help treat the bacterial infection  Acetaminophen  decreases pain and fever  It is available without a doctor's order  Ask how much to take and how often to take it  Follow directions  Read the labels of all other medicines you are using to see if they also contain acetaminophen, or ask your doctor or pharmacist  Acetaminophen can cause liver damage if not taken correctly  Do not use more than 4 grams (4,000 milligrams) total of acetaminophen in one day  NSAIDs , such as ibuprofen, help decrease swelling, pain, and fever  This medicine is available with or without a doctor's order  NSAIDs can cause stomach bleeding or kidney problems in certain people  If you take blood thinner medicine, always ask your healthcare provider if NSAIDs are safe for you  Always read the medicine label and follow directions      Take your medicine as directed  Contact your healthcare provider if you think your medicine is not helping or if you have side effects  Tell him or her if you are allergic to any medicine  Keep a list of the medicines, vitamins, and herbs you take  Include the amounts, and when and why you take them  Bring the list or the pill bottles to follow-up visits  Carry your medicine list with you in case of an emergency  Self-care:   Wash the area with soap and water every day  Gently pat dry  Use bandages if directed by your healthcare provider  Elevate the area above the level of your heart  as often as you can  This will help decrease swelling and pain  Prop the area on pillows or blankets to keep it elevated comfortably  Place a cool, damp cloth on the area  Use clean cloths and clean water  You can do this as often as you need to  Cool, damp cloths may help decrease pain  Apply cream or ointment as directed  These help protect the area  Most over-the-counter products, such as petroleum jelly, are good to use  Ask your healthcare provider about specific creams or ointments you should use  Prevent cellulitis:   Do not scratch bug bites or areas of injury  You increase your risk for cellulitis by scratching these areas  Do not share personal items, such as towels, clothing, and razors  Clean exercise equipment  with germ-killing  before and after you use it  Treat athlete's foot  This can help prevent the spread of a bacterial skin infection  Wash your hands often  Use soap and water  Wash your hands after you use the bathroom, change a child's diapers, or sneeze  Wash your hands before you prepare or eat food  Use lotion to prevent dry, cracked skin  Follow up with your doctor within 3 days, or as directed:  He or she will check if your cellulitis is getting better  Write down your questions so you remember to ask them during your visits    © Copyright [employer] 2022 Information is for End User's use only and may not be sold, redistributed or otherwise used for commercial purposes  All illustrations and images included in CareNotes® are the copyrighted property of A [employer]  or John Richardson  The above information is an  only  It is not intended as medical advice for individual conditions or treatments  Talk to your doctor, nurse or pharmacist before following any medical regimen to see if it is safe and effective for you

## 2022-09-18 LAB
BACTERIA BLD CULT: NORMAL
BACTERIA BLD CULT: NORMAL

## 2022-09-19 LAB
BACTERIA BLD CULT: NORMAL
BACTERIA BLD CULT: NORMAL

## 2022-09-20 ENCOUNTER — OFFICE VISIT (OUTPATIENT)
Dept: PODIATRY | Facility: CLINIC | Age: 67
End: 2022-09-20
Payer: MEDICARE

## 2022-09-20 VITALS — WEIGHT: 250 LBS | BODY MASS INDEX: 35 KG/M2 | HEIGHT: 71 IN

## 2022-09-20 DIAGNOSIS — L03.90 CELLULITIS: ICD-10-CM

## 2022-09-20 DIAGNOSIS — L08.9 DIABETIC FOOT INFECTION (HCC): ICD-10-CM

## 2022-09-20 DIAGNOSIS — E11.628 DIABETIC FOOT INFECTION (HCC): ICD-10-CM

## 2022-09-20 PROCEDURE — 99213 OFFICE O/P EST LOW 20 MIN: CPT | Performed by: PODIATRIST

## 2022-09-20 RX ORDER — GLIMEPIRIDE 4 MG/1
TABLET ORAL
COMMUNITY
Start: 2022-06-27

## 2022-09-20 NOTE — LETTER
September 20, 2022     Gaetano Urban DO  [address] [place] [address]    Patient: Cathy Sandhu   YOB: 1955   Date of Visit: 9/20/2022       Dear Dr Marla Leiva: Thank you for referring Ellie Walton to me for evaluation  Below are my notes for this consultation  If you have questions, please do not hesitate to call me  I look forward to following your patient along with you  Sincerely,        India Elliott DPM        CC: No Recipients  Susy Tilley  9/20/2022  3:46 PM  Sign when Signing Visit  Patient ID: Cathy Sandhu is a 74 y o  male Date of Birth 1955       Assessment:    No problem-specific Assessment & Plan notes found for this encounter  Diagnoses and all orders for this visit:    Diabetic foot infection ([address] )  -     Ambulatory Referral to Podiatry    Cellulitis  -     Ambulatory Referral to Podiatry    Other orders  -     glimepiride (AMARYL) 4 mg tablet  -     Empagliflozin 25 MG TABS; Take 25 mg by mouth daily              Procedures    Plan:  1  Reviewed podiatry notes from the last admission  Reviewed images and labs  2   Keratosis / loose skin removed  Wound is stable and infection resolved  Instructed daily local care  3   Recommended to wear orthowedge shoe for off-loading at all times  Possible TCC in wound center  4  Finish abx as prescribed  5   Will see him at wound center in 1 week  Subjective:        RYAN Kirkland presents for left foot ulcer  He had wound infection and was treated at [address] [place] [address] last week  He did not return to wound center after the last visit in July  He was wearing wrong shoes for a couple of days and developed an infection          The following portions of the patient's history were reviewed and updated as appropriate: allergies, current medications, past family history, past medical history, past social history, past surgical history and problem list       PAST MEDICAL HISTORY:  Past Medical History:   Diagnosis Date    Arrhythmia     Diabetes mellitus ([facility] )     History of echocardiogram 11/14/2017    showed EF of 50-55 percentWith moderate LVH and left ventricle diastolic dysfunction  Left atrium was moderately enlarged  Trace MR noted   History of Holter monitoring 11/21/2017    showed baseline rhythm of sinus origin with an average heart it of 61 bpm  The lowest heart rate was 49 and the highest heart rate was 10 8 bpm  There were rare single VPCs, and frequent PACs representing 3 2% of total beats  There were several episodes of sinus arrhythmias with sinus bradycardia and heart rate ranging from 40-90 bpm  No sustained dysrhythmias, or pauses noted  The patient did not    Hypertension     Obese        PAST SURGICAL HISTORY:  Past Surgical History:   Procedure Laterality Date    EYE SURGERY Left 1997    HERNIA REPAIR  [id number]    KNEE ARTHROPLASTY Right 2008    SHOULDER SURGERY Right 2002        ALLERGIES:  Sulfa antibiotics    MEDICATIONS:  Current Outpatient Medications   Medication Sig Dispense Refill    cephalexin (KEFLEX) 500 mg capsule Take 1 capsule (500 mg total) by mouth every 6 (six) hours for 8 days 32 capsule 0    diltiazem (CARDIZEM CD) 180 mg 24 hr capsule       Empagliflozin 25 MG TABS Take 25 mg by mouth daily      glimepiride (AMARYL) 4 mg tablet       Insulin Glargine Solostar (Lantus SoloStar) 100 UNIT/ML SOPN Inject 0 4 mL (40 Units total) under the skin daily at bedtime      Insulin Pen Needle (BD Pen Needle Nayla 2nd Gen) 32G X 4 MM MISC For use with insulin pen  Pharmacy may dispense brand covered by insurance  100 each 0    losartan (COZAAR) 50 mg tablet 100 mg      Multiple Vitamin (multivitamin) capsule Take 1 capsule by mouth daily 21 capsule 0    triamterene-hydrochlorothiazide (MAXZIDE-25) 37 5-25 mg per tablet Take 1 tablet by mouth daily       No current facility-administered medications for this visit         SOCIAL HISTORY:  Social History Socioeconomic History    Marital status: /Civil Union     Spouse name: None    Number of children: 2    Years of education: None    Highest education level: None   Occupational History    None   Tobacco Use    Smoking status: Former Smoker     Packs/day: 0 50     Years: 20 00     Pack years: 10 00     Types: Cigarettes     Quit date:      Years since quittin 7    Smokeless tobacco: Never Used   Vaping Use    Vaping Use: Never used   Substance and Sexual Activity    Alcohol use: Not Currently     Comment: quit     Drug use: Never    Sexual activity: Not Currently   Other Topics Concern    None   Social History Narrative    · Most recent tobacco use screenin2018      · Do you currently or have you served in [employer]:   No      · Live alone or with others:   with others      · High blood pressure: Yes      · Exercise level:   Occasional      · Overweight:   Yes      · Obese: Yes      · Diabetes:   Yes      Social Determinants of Health     Financial Resource Strain: Not on file   Food Insecurity: Not on file   Transportation Needs: Not on file   Physical Activity: Not on file   Stress: Not on file   Social Connections: Not on file   Intimate Partner Violence: Not on file   Housing Stability: Not on file      Review of Systems   Constitutional: Negative for appetite change, chills and fever  Respiratory: Negative for shortness of breath  Cardiovascular: Negative for chest pain  Gastrointestinal: Negative for diarrhea, nausea and vomiting  Skin: Positive for wound  Neurological: Positive for numbness  Objective:            Ht 5' 11" (1 803 m) Comment: verbal  Wt 113 kg (250 lb)   BMI 34 87 kg/m²     Physical Exam  Vitals reviewed  Constitutional:       General: He is not in acute distress  Appearance: He is not ill-appearing or toxic-appearing  Cardiovascular:      Rate and Rhythm: Normal rate and regular rhythm        Pulses: Normal pulses  Dorsalis pedis pulses are 2+ on the left side  Posterior tibial pulses are 2+ on the left side  Pulmonary:      Effort: Pulmonary effort is normal  No respiratory distress  Musculoskeletal:         General: Deformity present  No tenderness or signs of injury  Right foot: No foot drop  Left foot: No foot drop  Skin:     General: Skin is warm  Capillary Refill: Capillary refill takes less than 2 seconds  Coloration: Skin is not cyanotic or mottled  Findings: Wound present  No abscess, erythema or rash  Nails: There is no clubbing  Comments: Mild keratosis with wound left submet 2 area  It is 0 7 X 0 4 X 0 2 cm  Cellulitis resolved  No abscess  No purulence  Wound bed is granular  Neurological:      General: No focal deficit present  Mental Status: He is alert and oriented to person, place, and time  Cranial Nerves: No cranial nerve deficit  Sensory: Sensory deficit present  Coordination: Coordination normal    Psychiatric:         Mood and Affect: Mood normal          Behavior: Behavior normal          Thought Content:  Thought content normal          Judgment: Judgment normal

## 2022-09-20 NOTE — PROGRESS NOTES
Patient ID: Lisa Curiel is a 74 y o  male Date of Birth 1955       Assessment:    No problem-specific Assessment & Plan notes found for this encounter  Diagnoses and all orders for this visit:    Di[facility] )  -     Ambulatory Referral to Podiatry    Cellulitis  -     Ambulatory Referral to Podiatry    Other orders  -     glimepiride (AMARYL) 4 mg tablet  -     Empagliflozin 25 MG TABS; Take 25 mg by mouth daily              Procedures    Plan:  1  Reviewed podiatry notes from the last admission  Reviewed images and labs  2   Keratosis / loose skin removed  Wound is stable and infection resolved  Instructed daily local care  3   Recommended to wear orthowedge shoe for off-loading at all times  Possible TCC in wound center  4  Finish abx as prescribed  5   Will see him at wound center in 1 week  Subjective:        RYAN Kirkland presents for left foot ulcer  He had wound infection and was treated at [facility] last week  He did not return to wound center after the last visit in July  He was wearing wrong shoes for a couple of days and developed an infection  The following portions of the patient's history were reviewed and updated as appropriate: allergies, current medications, past family history, past medical history, past social history, past surgical history and problem list       PAST MEDICAL HISTORY:  Past Medical History:   Diagnosis Date    Arrhythmia     [facility] )     History of echocardiogram 11/14/2017    showed EF of 50-55 percentWith moderate LVH and left ventricle diastolic dysfunction  Left atrium was moderately enlarged  Trace MR noted   History of Holter monitoring 11/21/2017    showed baseline rhythm of sinus origin with an average heart it of 61 bpm  The lowest heart rate was 49 and the highest heart rate was 10 8 bpm  There were rare single VPCs, and frequent PACs representing 3 2% of total beats  There were several episodes of sinus arrhythmias with sinus bradycardia and heart rate ranging from 40-90 bpm  No sustained dysrhythmias, or pauses noted  The patient did not    Hypertension     Obese        PAST SURGICAL HISTORY:  Past Surgical History:   Procedure Laterality Date    EYE SURGERY Left 1997    HERNIA REPAIR  [id number]    KNEE ARTHROPLASTY Right 2008    SHOULDER SURGERY Right 2002        ALLERGIES:  Sulfa antibiotics    MEDICATIONS:  Current Outpatient Medications   Medication Sig Dispense Refill    cephalexin (KEFLEX) 500 mg capsule Take 1 capsule (500 mg total) by mouth every 6 (six) hours for 8 days 32 capsule 0    diltiazem (CARDIZEM CD) 180 mg 24 hr capsule       Empagliflozin 25 MG TABS Take 25 mg by mouth daily      glimepiride (AMARYL) 4 mg tablet       Insulin Glargine Solostar (Lantus SoloStar) 100 UNIT/ML SOPN Inject 0 4 mL (40 Units total) under the skin daily at bedtime      Insulin Pen Needle (BD Pen Needle Nayla 2nd Gen) 32G X 4 MM MISC For use with insulin pen  Pharmacy may dispense brand covered by insurance  100 each 0    losartan (COZAAR) 50 mg tablet 100 mg      Multiple Vitamin (multivitamin) capsule Take 1 capsule by mouth daily 21 capsule 0    triamterene-hydrochlorothiazide (MAXZIDE-25) 37 5-25 mg per tablet Take 1 tablet by mouth daily       No current facility-administered medications for this visit         SOCIAL HISTORY:  Social History     Socioeconomic History    Marital status: /Civil Union     Spouse name: None    Number of children: 2    Years of education: None    Highest education level: None   Occupational History    None   Tobacco Use    Smoking status: Former Smoker     Packs/day: 0 50     Years: 20 00     Pack years: 10 00     Types: Cigarettes     Quit date:      Years since quittin 7    Smokeless tobacco: Never Used   Vaping Use    Vaping Use: Never used   Substance and Sexual Activity    Alcohol use: Not Currently Comment: quit     Drug use: Never    Sexual activity: Not Currently   Other Topics Concern    None   Social History Narrative    · Most recent tobacco use screenin2018      · Do you currently or have you served in the [employer]:   No      · Live alone or with others:   with others      · High blood pressure: Yes      · Exercise level:   Occasional      · Overweight:   Yes      · Obese: Yes      · Diabetes:   Yes      Social Determinants of Health     Financial Resource Strain: Not on file   Food Insecurity: Not on file   Transportation Needs: Not on file   Physical Activity: Not on file   Stress: Not on file   Social Connections: Not on file   Intimate Partner Violence: Not on file   Housing Stability: Not on file      Review of Systems   Constitutional: Negative for appetite change, chills and fever  Respiratory: Negative for shortness of breath  Cardiovascular: Negative for chest pain  Gastrointestinal: Negative for diarrhea, nausea and vomiting  Skin: Positive for wound  Neurological: Positive for numbness  Objective:            Ht 5' 11" (1 803 m) Comment: verbal  Wt 113 kg (250 lb)   BMI 34 87 kg/m²     Physical Exam  Vitals reviewed  Constitutional:       General: He is not in acute distress  Appearance: He is not ill-appearing or toxic-appearing  Cardiovascular:      Rate and Rhythm: Normal rate and regular rhythm  Pulses: Normal pulses  Dorsalis pedis pulses are 2+ on the left side  Posterior tibial pulses are 2+ on the left side  Pulmonary:      Effort: Pulmonary effort is normal  No respiratory distress  Musculoskeletal:         General: Deformity present  No tenderness or signs of injury  Right foot: No foot drop  Left foot: No foot drop  Skin:     General: Skin is warm  Capillary Refill: Capillary refill takes less than 2 seconds  Coloration: Skin is not cyanotic or mottled        Findings: Wound present  No abscess, erythema or rash  Nails: There is no clubbing  Comments: Mild keratosis with wound left submet 2 area  It is 0 7 X 0 4 X 0 2 cm  Cellulitis resolved  No abscess  No purulence  Wound bed is granular  Neurological:      General: No focal deficit present  Mental Status: He is alert and oriented to person, place, and time  Cranial Nerves: No cranial nerve deficit  Sensory: Sensory deficit present  Coordination: Coordination normal    Psychiatric:         Mood and Affect: Mood normal          Behavior: Behavior normal          Thought Content:  Thought content normal          Judgment: Judgment normal

## 2022-09-29 ENCOUNTER — OFFICE VISIT (OUTPATIENT)
Dept: WOUND CARE | Facility: HOSPITAL | Age: 67
End: 2022-09-29
Payer: MEDICARE

## 2022-09-29 VITALS
HEIGHT: 70 IN | BODY MASS INDEX: 35.79 KG/M2 | RESPIRATION RATE: 18 BRPM | DIASTOLIC BLOOD PRESSURE: 67 MMHG | SYSTOLIC BLOOD PRESSURE: 141 MMHG | TEMPERATURE: 97.1 F | HEART RATE: 48 BPM | WEIGHT: 250 LBS

## 2022-09-29 DIAGNOSIS — E11.40 TYPE 2 DIABETES MELLITUS WITH DIABETIC NEUROPATHY, UNSPECIFIED WHETHER LONG TERM INSULIN USE (HCC): ICD-10-CM

## 2022-09-29 DIAGNOSIS — L97.522 ULCER OF LEFT FOOT, WITH FAT LAYER EXPOSED (HCC): Primary | ICD-10-CM

## 2022-09-29 PROCEDURE — 97597 DBRDMT OPN WND 1ST 20 CM/<: CPT | Performed by: PODIATRIST

## 2022-09-29 PROCEDURE — 99213 OFFICE O/P EST LOW 20 MIN: CPT | Performed by: PODIATRIST

## 2022-09-29 NOTE — PATIENT INSTRUCTIONS
Orders Placed This Encounter   Procedures    Wound cleansing and dressings     Wash your hands with soap and water  Remove old dressing, discard into plastic bag and place in trash  Cleanse the wound with mild soap and water prior to applying a clean dressing  Pat dry using gauze  Shower yes   Apply moisturizer to skin surrounding wound  Apply dermagran to the left foot wound  Cover with gauze  Secure with manda and tape  Change dressing daily  Standing Status:   Future     Standing Expiration Date:   9/29/2023    Wound off loading     Off-loading Instructions:    Wear off-loading device as directed by your physician  Put on immediately when rising in the morning and remove when going to bed       Standing Status:   Future     Standing Expiration Date:   9/29/2023

## 2022-09-29 NOTE — PROGRESS NOTES
Patient ID: Annie Quiroz is a 74 y o  male Date of Birth 1955     Chief Complaint  Chief Complaint   Patient presents with    New Patient Visit     Left foot wound       Allergies  Sulfa antibiotics    Assessment:    No problem-specific Assessment & Plan notes found for this encounter  Diagnoses and all orders for this visit:    Ulcer of left foot, with fat layer exposed ([address] )  -     Wound cleansing and dressings; Future  -     Wound off loading; Future  -     Debridement    Type 2 diabetes mellitus with diabetic neuropathy, unspecified whether long term insulin use (HCC)  -     Wound cleansing and dressings; Future  -     Wound off loading; Future  -     Debridement              Debridement   Wound 09/29/22 Diabetic Ulcer Foot Left;Plantar    Universal Protocol:  Consent: Verbal consent obtained  Risks and benefits: risks, benefits and alternatives were discussed  Consent given by: patient  Time out: Immediately prior to procedure a "time out" was called to verify the correct patient, procedure, equipment, support staff and site/side marked as required  Timeout called at: 9/29/2022 8:42 AM   Patient understanding: patient states understanding of the procedure being performed  Patient identity confirmed: verbally with patient      Performed by: physician  Debridement type: selective  Pain control: lidocaine 4%  Post-debridement measurements  Length (cm): 0 5  Width (cm): 0 1  Depth (cm): 0 1  Percent debrided: 100%  Surface Area (cm^2): 0 05  Area debrided (cm^2): 0 05  Volume (cm^3): 0 01  Devitalized tissue debrided: callus, fibrin and slough  Instrument(s) utilized: blade  Bleeding: small  Hemostasis obtained with: pressure  Procedural pain (0-10): 0  Post-procedural pain: 0   Response to treatment: procedure was tolerated well          Plan:  1  Wound is maris well  Continue serial debridement and orthowedge shoe for off-loading     2  Pt to call the wound center  if any signs of infection develop such as erythema, increased wound size or significant change in appearance of wound, odor, pain, increased or change in color of drainage, swelling, fever, chills, nightsweats  3  RA in 2 weeks  Wound 09/29/22 Diabetic Ulcer Foot Left;Plantar (Active)   Wound Image Images linked 09/29/22 0830   Wound Description Dry;Epithelialization;Pink;Brown 09/29/22 0829   Jenn-wound Assessment Callus 09/29/22 0829   Wound Length (cm) 0 5 cm 09/29/22 0829   Wound Width (cm) 0 1 cm 09/29/22 0829   Wound Depth (cm) 0 1 cm 09/29/22 0829   Wound Surface Area (cm^2) 0 05 cm^2 09/29/22 0829   Wound Volume (cm^3) 0 005 cm^3 09/29/22 0829   Calculated Wound Volume (cm^3) 0 01 cm^3 09/29/22 0829   Drainage Amount None 09/29/22 0829   Non-staged Wound Description Partial thickness 09/29/22 0829       Wound 09/29/22 Diabetic Ulcer Foot Left;Plantar (Active)   Date First Assessed/Time First Assessed: 09/29/22 0829   Primary Wound Type: Diabetic Ulcer  Location: Foot  Wound Location Orientation: Left;Plantar       Subjective:        RYAN Corcoran presents with his wife for left foot ulcer  No pain  No redness or edema  He feels well  BS under control  The following portions of the patient's history were reviewed and updated as appropriate: allergies, current medications, past family history, past medical history, past social history, past surgical history and problem list     Review of Systems   Constitutional: Negative for chills and fever  Respiratory: Negative for shortness of breath  Cardiovascular: Negative for chest pain  Gastrointestinal: Negative for diarrhea, nausea and vomiting  Skin: Positive for wound           Objective:       Wound 09/29/22 Diabetic Ulcer Foot Left;Plantar (Active)   Wound Image Images linked 09/29/22 0830   Wound Description Dry;Epithelialization;Pink;Brown 09/29/22 0829   Jenn-wound Assessment Callus 09/29/22 0829   Wound Length (cm) 0 5 cm 09/29/22 0829   Wound Width (cm) 0 1 cm 09/29/22 0829   Wound Depth (cm) 0 1 cm 09/29/22 0829   Wound Surface Area (cm^2) 0 05 cm^2 09/29/22 0829   Wound Volume (cm^3) 0 005 cm^3 09/29/22 0829   Calculated Wound Volume (cm^3) 0 01 cm^3 09/29/22 0829   Drainage Amount None 09/29/22 0829   Non-staged Wound Description Partial thickness 09/29/22 0829       /67   Pulse (!) 48   Temp (!) 97 1 °F (36 2 °C) (Temporal)   Resp 18   Ht 5' 10" (1 778 m)   Wt 113 kg (250 lb)   BMI 35 87 kg/m²     Physical Exam  Vitals and nursing note reviewed  Constitutional:       General: He is not in acute distress  Appearance: Normal appearance  He is not ill-appearing or toxic-appearing  Cardiovascular:      Rate and Rhythm: Normal rate and regular rhythm  Pulses: Normal pulses  Pulmonary:      Effort: Pulmonary effort is normal  No respiratory distress  Musculoskeletal:         General: Deformity present  No tenderness or signs of injury  Skin:     General: Skin is warm  Coloration: Skin is not cyanotic or mottled  Findings: Wound present  No abscess, erythema or rash  Nails: There is no clubbing  Comments: Ulcer presents left submet 2  Mild periwound callus noted  Wound is smaller with minimal depth  No redness or purulence  No deep probing  See wound assessments  Neurological:      General: No focal deficit present  Mental Status: He is alert and oriented to person, place, and time  Cranial Nerves: No cranial nerve deficit  Sensory: Sensory deficit present  Coordination: Coordination normal    Psychiatric:         Mood and Affect: Mood normal          Behavior: Behavior normal          Thought Content: Thought content normal          Judgment: Judgment normal            Wound Instructions:  Orders Placed This Encounter   Procedures    Wound cleansing and dressings     Wash your hands with soap and water  Remove old dressing, discard into plastic bag and place in trash    Cleanse the wound with mild soap and water prior to applying a clean dressing  Pat dry using gauze  Shower yes   Apply moisturizer to skin surrounding wound  Apply dermagran to the left foot wound  Cover with gauze  Secure with manda and tape  Change dressing daily  Standing Status:   Future     Standing Expiration Date:   9/29/2023    Wound off loading     Off-loading Instructions:    Wear off-loading device as directed by your physician  Put on immediately when rising in the morning and remove when going to bed  Standing Status:   Future     Standing Expiration Date:   9/29/2023    Debridement     This order was created via procedure documentation        Diagnosis ICD-10-CM Associated Orders   1  Ulcer of left foot, with fat layer exposed ([facility] )  [address]97 522 Wound cleansing and dressings     Wound off loading     Debridement   2   Type 2 diabetes mellitus with diabetic neuropathy, unspecified whether long term insulin use ([facility])  E11 40 Wound cleansing and dressings     Wound off loading     Debridement

## 2022-10-12 PROBLEM — J12.82 PNEUMONIA DUE TO COVID-19 VIRUS: Status: RESOLVED | Noted: 2020-12-25 | Resolved: 2022-10-12

## 2022-10-12 PROBLEM — U07.1 PNEUMONIA DUE TO COVID-19 VIRUS: Status: RESOLVED | Noted: 2020-12-25 | Resolved: 2022-10-12

## 2022-10-13 ENCOUNTER — OFFICE VISIT (OUTPATIENT)
Dept: WOUND CARE | Facility: HOSPITAL | Age: 67
End: 2022-10-13
Payer: MEDICARE

## 2022-10-13 VITALS
HEART RATE: 75 BPM | TEMPERATURE: 97.1 F | DIASTOLIC BLOOD PRESSURE: 63 MMHG | SYSTOLIC BLOOD PRESSURE: 129 MMHG | RESPIRATION RATE: 18 BRPM

## 2022-10-13 DIAGNOSIS — E11.40 TYPE 2 DIABETES MELLITUS WITH DIABETIC NEUROPATHY, UNSPECIFIED WHETHER LONG TERM INSULIN USE (HCC): ICD-10-CM

## 2022-10-13 DIAGNOSIS — L97.522 ULCER OF LEFT FOOT, WITH FAT LAYER EXPOSED (HCC): Primary | ICD-10-CM

## 2022-10-13 PROCEDURE — 99213 OFFICE O/P EST LOW 20 MIN: CPT | Performed by: PODIATRIST

## 2022-10-13 PROCEDURE — 99212 OFFICE O/P EST SF 10 MIN: CPT | Performed by: PODIATRIST

## 2022-10-13 NOTE — PROGRESS NOTES
Patient ID: Bridget Meneses is a 69 y o  male Date of Birth 1955     Chief Complaint  Chief Complaint   Patient presents with   • Follow Up Wound Care Visit     Left foot wound         Allergies  Sulfa antibiotics    Assessment:    No problem-specific Assessment & Plan notes found for this encounter  Diagnoses and all orders for this visit:    Ulcer of left foot, with fat layer exposed ([facility] [address] )  -     Wound cleansing and dressings; Future    Type 2 diabetes mellitus with diabetic neuropathy, unspecified whether long term insulin use ([facility] [address] )              Procedures    Plan:  1  Reviewed previous wound care note and photo  2  Wound looks healed  Instructed skin care and protection  Dry dressing for a week  3  Slowly transition to diabetic shoe with close monitoring at home  4  Educated disease prevention and risks related to diabetes  Educated proper daily foot care and exam    5  D/C him from [facility]  Pt to follow-up at my office in 1 month  Wound 09/29/22 Diabetic Ulcer Foot Left;Plantar (Active)   Wound Image Images linked 10/13/22 0826   Wound Description Dry;Epithelialization;Brown 10/13/22 0825   Jenn-wound Assessment Callus 10/13/22 0825   Wound Length (cm) 0 cm 10/13/22 0825   Wound Width (cm) 0 cm 10/13/22 0825   Wound Depth (cm) 0 cm 10/13/22 0825   Wound Surface Area (cm^2) 0 cm^2 10/13/22 0825   Wound Volume (cm^3) 0 cm^3 10/13/22 0825   Calculated Wound Volume (cm^3) 0 cm^3 10/13/22 0825   Change in Wound Size % 100 10/13/22 0825   Drainage Amount None 10/13/22 0825   Non-staged Wound Description Not applicable 58/38/59 0624       Wound 09/29/22 Diabetic Ulcer Foot Left;Plantar (Active)   Date First Assessed/Time First Assessed: 09/29/22 0829   Primary Wound Type: Diabetic Ulcer  Location: Foot  Wound Location Orientation: Left;Plantar  Wound Outcome: Healed       Subjective:        HPI  Deandre Baumann presents with his wife for left foot ulcer  No pain  No redness or edema  No drainage    He feels well  BS under control  The following portions of the patient's history were reviewed and updated as appropriate: allergies, current medications, past family history, past medical history, past social history, past surgical history and problem list     Review of Systems   Constitutional: Negative for chills and fever  Respiratory: Negative for shortness of breath  Cardiovascular: Negative for chest pain  Gastrointestinal: Negative for diarrhea, nausea and vomiting  Neurological: Positive for numbness  Objective:       Wound 09/29/22 Diabetic Ulcer Foot Left;Plantar (Active)   Wound Image Images linked 10/13/22 0826   Wound Description Dry;Epithelialization;Brown 10/13/22 0825   Jenn-wound Assessment Callus 10/13/22 0825   Wound Length (cm) 0 cm 10/13/22 0825   Wound Width (cm) 0 cm 10/13/22 0825   Wound Depth (cm) 0 cm 10/13/22 0825   Wound Surface Area (cm^2) 0 cm^2 10/13/22 0825   Wound Volume (cm^3) 0 cm^3 10/13/22 0825   Calculated Wound Volume (cm^3) 0 cm^3 10/13/22 0825   Change in Wound Size % 100 10/13/22 0825   Drainage Amount None 10/13/22 0825   Non-staged Wound Description Not applicable 42/21/88 5284       /63   Pulse 75   Temp (!) 97 1 °F (36 2 °C)   Resp 18     Physical Exam  Vitals and nursing note reviewed  Constitutional:       General: He is not in acute distress  Appearance: Normal appearance  He is not ill-appearing or toxic-appearing  Cardiovascular:      Rate and Rhythm: Normal rate and regular rhythm  Pulses: Normal pulses  Pulmonary:      Effort: Pulmonary effort is normal  No respiratory distress  Musculoskeletal:         General: Deformity present  No tenderness or signs of injury  Skin:     General: Skin is warm  Coloration: Skin is not cyanotic or mottled  Findings: No abscess, erythema or rash  Nails: There is no clubbing  Comments: Ulcer closed left submet 2  Minimal callus noted  See wound assessments  Neurological:      General: No focal deficit present  Mental Status: He is alert and oriented to person, place, and time  Cranial Nerves: No cranial nerve deficit  Sensory: Sensory deficit present  Coordination: Coordination normal    Psychiatric:         Mood and Affect: Mood normal          Behavior: Behavior normal          Thought Content: Thought content normal          Judgment: Judgment normal            Wound Instructions:  Orders Placed This Encounter   Procedures   • Wound cleansing and dressings     Left foot wound is healed  Wear diabetic shoes  Gradually increase wearing diabetic shoes  If does not wear diabetic shoes, wear offloading shoe  Keep wound covered for a week  Followup with Dr Willard Henry in his office in 5-6 weeks  Standing Status:   Future     Standing Expiration Date:   10/13/2023        Diagnosis ICD-10-CM Associated Orders   1  Ul[facility] of left foot, with [facility] layer exposed [facility] )  L91 527 Wound cleansing and dressings   2   Type 2 diabetes mellitus with diabetic neuropathy, unspecified whether long term insulin use ([facility])  E11 40

## 2022-10-13 NOTE — PATIENT INSTRUCTIONS
Orders Placed This Encounter   Procedures    Wound cleansing and dressings     Left foot wound is healed  Wear diabetic shoes  Gradually increase wearing diabetic shoes  If does not wear diabetic shoes, wear offloading shoe  Keep wound covered for a week  Followup with Dr Susanna Mcdonnell in his office in 5-6 weeks       Standing Status:   Future     Standing Expiration Date:   10/13/2023

## 2022-10-14 ENCOUNTER — TELEPHONE (OUTPATIENT)
Dept: PODIATRY | Facility: CLINIC | Age: 67
End: 2022-10-14

## 2022-10-14 NOTE — TELEPHONE ENCOUNTER
Caller: NERI/Geraldine    Doctor/Office: Dr Lesley Jarvis    #: [id number]    Reason for Escalation: Needs letter from Dr Raoul Gordon stating that patient is healed of foot ulcer/infection and is cleared to have shoulder surgery with [facility]  Please fax note/letter to [address] @ [phone] questions contact Nat Heller at number above   Thanks

## 2022-11-15 ENCOUNTER — OFFICE VISIT (OUTPATIENT)
Dept: PODIATRY | Facility: CLINIC | Age: 67
End: 2022-11-15

## 2022-11-15 VITALS
HEIGHT: 70 IN | WEIGHT: 250 LBS | SYSTOLIC BLOOD PRESSURE: 122 MMHG | BODY MASS INDEX: 35.79 KG/M2 | DIASTOLIC BLOOD PRESSURE: 60 MMHG

## 2022-11-15 DIAGNOSIS — Z79.4 TYPE 2 DIABETES MELLITUS WITH DIABETIC NEUROPATHY, WITH LONG-TERM CURRENT USE OF INSULIN (HCC): ICD-10-CM

## 2022-11-15 DIAGNOSIS — L97.521 ULCER OF LEFT FOOT, LIMITED TO BREAKDOWN OF SKIN (HCC): Primary | ICD-10-CM

## 2022-11-15 DIAGNOSIS — E11.40 TYPE 2 DIABETES MELLITUS WITH DIABETIC NEUROPATHY, WITH LONG-TERM CURRENT USE OF INSULIN (HCC): ICD-10-CM

## 2022-11-15 RX ORDER — TRAZODONE HYDROCHLORIDE 50 MG/1
TABLET ORAL
COMMUNITY
Start: 2022-11-11

## 2022-11-15 RX ORDER — DILTIAZEM HYDROCHLORIDE 180 MG/1
CAPSULE, EXTENDED RELEASE ORAL
COMMUNITY
Start: 2022-11-10

## 2022-11-15 NOTE — PROGRESS NOTES
Patient ID: Marlena Amado is a 69 y o  male Date of Birth 1955       Assessment:    No problem-specific Assessment & Plan notes found for this encounter  Diagnoses and all orders for this visit:    Ulcer of left foot, limited to breakdown of skin ([address] )    Type 2 diabetes mellitus with diabetic neuropathy, with long-term current use of insulin ([address] )    Other orders  -     traZODone (DESYREL) 50 mg tablet  -     Dilt- MG 24 hr capsule;  (Patient not taking: Reported on 11/15/2022)              Procedures    Plan:  1  Reviewed medical records  Reviewed previous wound care note  2  Removed keratosis and wound re-occurring  Wound is still stable and partial thick  3  Instructed daily local care  Return to orthowedge shoe with limited walking  4  Pt to call the office if any signs of infection develop or significant change in appearance of wound  5  RA in 2 weeks  Subjective:        HPI  Vitaliy Pichardo presents for evaluation of left foot  He was discharged from wound center about 4 weeks ago and presents with diabetic shoes today  No pain  No redness or edema  His wife noticed discoloration on left plantar foot  BS under control  The following portions of the patient's history were reviewed and updated as appropriate: allergies, current medications, past family history, past medical history, past social history, past surgical history and problem list     Review of Systems   Constitutional: Negative for chills and fever  Respiratory: Negative for shortness of breath  Cardiovascular: Negative for chest pain  Gastrointestinal: Negative for diarrhea, nausea and vomiting  Musculoskeletal: Negative for gait problem  Objective:            /60   Ht 5' 10" (1 778 m) Comment: verbal  Wt 113 kg (250 lb)   BMI 35 87 kg/m²     Physical Exam  Vitals reviewed  Constitutional:       General: He is not in acute distress  Appearance: Normal appearance   He is not ill-appearing or toxic-appearing  Cardiovascular:      Rate and Rhythm: Normal rate and regular rhythm  Pulses: Normal pulses  Pulmonary:      Effort: Pulmonary effort is normal  No respiratory distress  Musculoskeletal:         General: Deformity present  No tenderness or signs of injury  Skin:     General: Skin is warm  Coloration: Skin is not cyanotic or mottled  Findings: Wound present  No abscess, erythema or rash  Nails: There is no clubbing  Comments: Callus, skin maceration, and dry blood noted left submet 2 area  Partial thick ulcer presents under the skin lesion  It is about 0 4 X 0 3 X 0 1 cm  No deep probing  No cellulitis  No purulence or signs of infection  Neurological:      General: No focal deficit present  Mental Status: He is alert and oriented to person, place, and time  Cranial Nerves: No cranial nerve deficit  Sensory: Sensory deficit present  Coordination: Coordination normal    Psychiatric:         Mood and Affect: Mood normal          Behavior: Behavior normal          Thought Content:  Thought content normal          Judgment: Judgment normal

## 2022-11-18 LAB — HBA1C MFR BLD HPLC: 7.2 %

## 2022-11-29 ENCOUNTER — PROCEDURE VISIT (OUTPATIENT)
Dept: PODIATRY | Facility: CLINIC | Age: 67
End: 2022-11-29

## 2022-11-29 VITALS — HEIGHT: 70 IN | BODY MASS INDEX: 35.79 KG/M2 | WEIGHT: 250 LBS

## 2022-11-29 DIAGNOSIS — L97.521 ULCER OF LEFT FOOT, LIMITED TO BREAKDOWN OF SKIN (HCC): Primary | ICD-10-CM

## 2022-11-29 DIAGNOSIS — Z79.4 TYPE 2 DIABETES MELLITUS WITH DIABETIC NEUROPATHY, WITH LONG-TERM CURRENT USE OF INSULIN (HCC): ICD-10-CM

## 2022-11-29 DIAGNOSIS — E11.40 TYPE 2 DIABETES MELLITUS WITH DIABETIC NEUROPATHY, WITH LONG-TERM CURRENT USE OF INSULIN (HCC): ICD-10-CM

## 2022-11-29 NOTE — PROGRESS NOTES
Patient ID: Glenys Grant is a 69 y o  male Date of Birth 1955       Assessment:    No problem-specific Assessment & Plan notes found for this encounter  Diagnoses and all orders for this visit:    Ulcer of left foot, limited to breakdown of skin ([address] )  -     Orthowedge Shoe    Type 2 diabetes mellitus with diabetic neuropathy, with long-term current use of insulin ([address] )              Procedures    Plan:  1  Reviewed medical records  Reviewed previous note  2  Removed keratosis  Wound looks healed  Instructed protective dressing daily  3  Dispensed new orthowedge shoe  4  Pt to call the office if any signs of infection develop or significant change in appearance of wound  5  RA in 2 weeks  Subjective:        HPI  Minal Hernandez presents for evaluation of left foot ulcer  No drainage  No pain  No redness or edema  BS under control  His orthowedge shoe seems to be too long for his foot  The following portions of the patient's history were reviewed and updated as appropriate: allergies, current medications, past family history, past medical history, past social history, past surgical history and problem list     Review of Systems   Constitutional: Negative for chills and fever  Respiratory: Negative for shortness of breath  Cardiovascular: Negative for chest pain  Gastrointestinal: Negative for diarrhea, nausea and vomiting  Musculoskeletal: Negative for gait problem  Objective:            Ht 5' 10" (1 778 m) Comment: verbal  Wt 113 kg (250 lb)   BMI 35 87 kg/m²     Physical Exam  Vitals reviewed  Constitutional:       General: He is not in acute distress  Appearance: Normal appearance  He is not ill-appearing or toxic-appearing  Cardiovascular:      Rate and Rhythm: Normal rate and regular rhythm  Pulses: Normal pulses  Pulmonary:      Effort: Pulmonary effort is normal  No respiratory distress  Musculoskeletal:         General: Deformity present   No tenderness or signs of injury  Skin:     General: Skin is warm  Coloration: Skin is not cyanotic or mottled  Findings: No abscess, erythema or rash  Nails: There is no clubbing  Comments: Callus and dry blood noted left submet 2 area  Ulcer is epithelized after removing callus  No drainage  No cellulitis  No purulence or signs of infection  Neurological:      General: No focal deficit present  Mental Status: He is alert and oriented to person, place, and time  Cranial Nerves: No cranial nerve deficit  Sensory: Sensory deficit present  Coordination: Coordination normal    Psychiatric:         Mood and Affect: Mood normal          Behavior: Behavior normal          Thought Content:  Thought content normal          Judgment: Judgment normal

## 2022-12-08 ENCOUNTER — PREP FOR PROCEDURE (OUTPATIENT)
Dept: OBGYN CLINIC | Facility: OTHER | Age: 67
End: 2022-12-08

## 2022-12-08 ENCOUNTER — APPOINTMENT (OUTPATIENT)
Dept: RADIOLOGY | Facility: OTHER | Age: 67
End: 2022-12-08

## 2022-12-08 ENCOUNTER — OFFICE VISIT (OUTPATIENT)
Dept: OBGYN CLINIC | Facility: OTHER | Age: 67
End: 2022-12-08

## 2022-12-08 VITALS
DIASTOLIC BLOOD PRESSURE: 75 MMHG | HEART RATE: 83 BPM | WEIGHT: 256.2 LBS | SYSTOLIC BLOOD PRESSURE: 130 MMHG | BODY MASS INDEX: 36.68 KG/M2 | HEIGHT: 70 IN

## 2022-12-08 DIAGNOSIS — M19.012 PRIMARY OSTEOARTHRITIS OF LEFT SHOULDER: Primary | ICD-10-CM

## 2022-12-08 DIAGNOSIS — M25.512 ACUTE PAIN OF LEFT SHOULDER: ICD-10-CM

## 2022-12-08 DIAGNOSIS — M25.512 CHRONIC LEFT SHOULDER PAIN: ICD-10-CM

## 2022-12-08 DIAGNOSIS — D69.6 THROMBOCYTOPENIA (HCC): ICD-10-CM

## 2022-12-08 DIAGNOSIS — E66.01 OBESITY, MORBID (HCC): ICD-10-CM

## 2022-12-08 DIAGNOSIS — G89.29 CHRONIC LEFT SHOULDER PAIN: ICD-10-CM

## 2022-12-08 RX ORDER — CHLORHEXIDINE GLUCONATE 0.12 MG/ML
15 RINSE ORAL ONCE
OUTPATIENT
Start: 2022-12-08 | End: 2022-12-08

## 2022-12-08 NOTE — PATIENT INSTRUCTIONS
What to Expect Before and After Shoulder Replacement Surgery  You are being scheduled for a shoulder replacement by Dr Dony Bustamante to treat your shoulder condition  Here is some information which may help to answer questions that you may have  Please do not hesitate to reach out to our team to answer questions not addressed here  Before Surgery  You will be contacted the evening prior to your surgery to confirm the scheduled time of the procedure and when to arrive at the hospital    Do not eat or drink anything after midnight the night before your surgery so that the anesthesia can be performed safely  If you have been fitted for a sling in the office prior to the surgery please remember to bring it to the hospital   You will meet the anesthesiologist the morning of the surgery  The surgery is performed under a general anesthetic but they will also offer you a regional block (shot to numb the arm) to help control your post-operative pain as well as with a catheter that is left in place after the block  The catheter is connected to a small pump which will continue to provide numbing medicine and help prolong the pain control from the block  Unfortunately this catheter is not as effective as the initial block, but can still be very helpful in managing the pain  After Surgery and in the Hospital  The shoulder replacement surgery typically takes 60-90 minutes  When surgery is completed, your surgeon will update your family and friends on your condition and progress  You will remain in the recovery room for at least an hour or until the anesthesia has worn off and your blood pressure and pulse are stable  If you have pain, the nurses will give you medication  Once out of surgery, your surgeon will decide on how long you will be using a sling in order to protect and position your shoulder  However, this won't keep you from starting physical therapy    Exercises typically begin on the day after surgery with emphasis on moving the shoulder, wrist, and hand  The physical therapist will be provided with a detailed protocol but typically the first 6 weeks are used to regain range of motion and then strengthening is initiated  Starting strengthening exercises too early may lead to complications  When You Are Discharged from the [address] [place] [address] can expect to be released from the hospital the day after surgery (this may change if you have special needs or medical conditions)  Before you are released, the treatment team (Orthopaedic surgery residents, physician assistants and physical therapists) will talk with you about the importance of limiting any sudden or stressful movements to the arm for several weeks or longer  Activities that involve pushing, pulling, and lifting should not be done until you are given permission from your surgeon  Your First Day at [address] [place] [address] may need help with your daily activities, so it is a good idea to have family and friends prepared to help you  It is okay to remove the sling and let the arm hang at the side so that you can get cleaned and change your clothes  To put on a shirt, place the bad arm in first and then the good arm  Reverse to take it off  Don't forget to wear the sling every night for at least the first month after surgery, and never use your arm to push yourself up in bed or from a chair  The added weight on your shoulder may cause you to re-injure the joint  How to [place] in the First Week  You are encouraged to return to your normal eating and sleeping patterns as soon as possible  It is important for you to be active in order to control your weight and muscle tone  It is ok to increase your activity level and even perform light aerobic exercise (like walking or riding a stationary bike) within the first week or so if you are feeling up to it    If there is concern about these activates best to wait until the first post-operative visit and discuss this with the raissa Hurst might be able to return to work within several days if you can perform your job while wearing a sling  Consult with your doctor, as this differs from patient to patient  However, if your job requires heavy lifting or climbing, there may be a delay for several months  Until you are seen for your first follow-up visit, please try and keep wound dry  It is okay to shower but try your best to keep the incision out of direct contact with the water (or consider using a waterproof bandage)  If it does gets wet please dry as best as possible afterwards  If you notice any drainage or a foul odor from your incision or your temperature goes above 101 5 degrees, please contact the office  What You Can Expect in the First Month  Your first post-operative appointment will be with Dr La Nelson physician assistant (PA) around 2 weeks after the surgery  You skin staples will be removed and X-rays will be obtained at that visit  Please understand that it is quite common to still experience pain at that time but the pain should be steadily improving  Hopefully physical therapy has already begun but if not it will be initiated at this visit and will continue for the 8-12 weeks  At about 12 weeks after surgery you will start a progressive strengthening program  Physical therapy is a deliberate process of not only strengthening your shoulder but also altering how you use your arm  It may be many months before your desired results are achieved, so do not get discouraged  Your shoulder will generally continue to improve steadily up to 6-8 months after surgery  After that point further improvement is very slow; although it has been shown that even after a year or more, activity can increase as muscle strength continues to improve  After the First Month at Home   Because each person heals differently, there are different recovery timelines  An average recovery period typically lasts about between 3-6 months  Talk with your surgeon about which activities will be appropriate for you once you have recovered

## 2022-12-08 NOTE — PROGRESS NOTES
Assessment  Diagnoses and all orders for this visit:    Primary osteoarthritis of left shoulder    Acute pain of left shoulder        Discussion and Plan:    Is a good candidate for reverse total shoulder arthroplasty of his left shoulder  He was scheduled for this as below by one of my colleagues at [facility] but given his medical conditions it was felt that he should have the procedure performed here at [place] [address]  We are happy to accommodate this and will place him on the schedule for left reverse total shoulder arthroplasty given his lack of improvement with nonoperative care for his left glenohumeral osteoarthritis and suspected rotator cuff deficiency  He was medically optimized and proceed forward with scheduling  Patient understands he is at higher risk for postoperative complication given his multiple medical comorbidities and that is evidenced by the fact that he had to be referred to have a surgery at [address]     A thorough discussion was performed with the patient reviewing all operative and nonoperative options as well as the risks of the procedure  Risks discussed include but not limited to persistent pain, dislocation, loosening of the prosthesis, infection, need for further surgery including neurovascular injury, as well as the risk of anesthesia  After this discussion all questions were answered and informed consent was obtained for Reverse Total Shoulder Arthroplasty of the left shoulder  Subjective:   Patient ID: Jinny George is a 69 y o  male      HPI  The patient presents with a chief complaint of left shoulder pain  Patient reports a  fracture dislocation in 1995 resulting in post traumatic OA  The patient describes the pain as aching, dull and sharp in intensity,  intermittent in timing, and localizes the pain to the  left globally  The pain is worse with movement and relieved by rest   The pain is not associated with numbness and tingling    The pain is not associated with constitutional symptoms  The patient is awoken at night by the pain  The patient had been treating conservatively with Dr Que Jaimes at [address] [place] [address] [address]  He was scheduled for a left reverse total shoulder however due to his low platelet count he had to be done at the hospital however Dr Que Jaimes did not have privileges  The following portions of the patient's history were reviewed and updated as appropriate: allergies, current medications, past family history, past medical history, past social history, past surgical history and problem list     Review of Systems   Constitutional: Negative for chills and fever  HENT: Negative for drooling and hearing loss  Eyes: Negative for visual disturbance  Respiratory: Negative for cough and shortness of breath  Cardiovascular: Negative for chest pain  Gastrointestinal: Negative for abdominal pain  Skin: Negative for rash  Psychiatric/Behavioral: Negative for agitation  Objective:  /75   Pulse 83   Ht 5' 10" (1 778 m)   Wt 116 kg (256 lb 3 2 oz)   BMI 36 76 kg/m²       Left Shoulder Exam     Range of Motion   External rotation: 0   Forward flexion: 60 (PROM 90)   Left shoulder internal rotation 0 degrees: buttocks  Muscle Strength   Abduction: 3/5   External rotation: 3/5     Other   Erythema: absent  Sensation: normal  Pulse: present             Physical Exam  Vitals reviewed  Constitutional:       Appearance: He is well-developed  HENT:      Head: Normocephalic  Eyes:      Pupils: Pupils are equal, round, and reactive to light  Pulmonary:      Effort: Pulmonary effort is normal    Abdominal:      General: Abdomen is flat  There is no distension  Skin:     General: Skin is warm and dry  I have personally reviewed pertinent films in PACS and my interpretation is as follows  Left shoulder x-rays demonstrates severe glenohumeral degenerative changes, inferior osteophyte       Most Recent   WBC 4 02 Low  (9/13/22)   Hemoglobin 15 1 (9/13/22)   HCT 45 9 (9/13/22)   Platelet Count 27 JAA  (9/16/22)         Scribe Attestation    I,:  Abimael Jarquin am acting as a scribe while in the presence of the attending physician :       I,:  Lizzy Alvarez MD personally performed the services described in this documentation    as scribed in my presence :

## 2022-12-13 ENCOUNTER — PROCEDURE VISIT (OUTPATIENT)
Dept: PODIATRY | Facility: CLINIC | Age: 67
End: 2022-12-13

## 2022-12-13 VITALS
SYSTOLIC BLOOD PRESSURE: 144 MMHG | WEIGHT: 256 LBS | HEART RATE: 76 BPM | HEIGHT: 70 IN | BODY MASS INDEX: 36.65 KG/M2 | DIASTOLIC BLOOD PRESSURE: 67 MMHG

## 2022-12-13 DIAGNOSIS — L97.521 ULCER OF LEFT FOOT, LIMITED TO BREAKDOWN OF SKIN (HCC): Primary | ICD-10-CM

## 2022-12-13 DIAGNOSIS — E11.40 TYPE 2 DIABETES MELLITUS WITH DIABETIC NEUROPATHY, WITH LONG-TERM CURRENT USE OF INSULIN (HCC): ICD-10-CM

## 2022-12-13 DIAGNOSIS — Z79.4 TYPE 2 DIABETES MELLITUS WITH DIABETIC NEUROPATHY, WITH LONG-TERM CURRENT USE OF INSULIN (HCC): ICD-10-CM

## 2022-12-13 NOTE — PROGRESS NOTES
Patient ID: Marlena Amado is a 69 y o  male Date of Birth 1955       Assessment:    No problem-specific Assessment & Plan notes found for this encounter  Diagnoses and all orders for this visit:    Ulcer of left foot, limited to breakdown of skin ([facility] )    Type 2 diabetes mellitus with diabetic neuropathy, with long-term current use of insulin ([facility] )              Procedures    Plan:  1  Reviewed medical records  2  Removed keratosis  Instructed skin care and protection  Instructed protective dressing daily  3  Orthowedge shoe for 2 more weeks, then transition to diabetic shoes  4  Pt to call the office if any signs of infection develop or significant change in appearance of wound  5  RA in 4 weeks  Subjective:        HPI  Vitaliy Pichardo presents for evaluation of left foot  No drainage  No pain  No redness or edema  BS under control  No new complaint  The following portions of the patient's history were reviewed and updated as appropriate: allergies, current medications, past family history, past medical history, past social history, past surgical history and problem list     Review of Systems   Constitutional: Negative for chills and fever  Respiratory: Negative for shortness of breath  Cardiovascular: Negative for chest pain  Gastrointestinal: Negative for diarrhea, nausea and vomiting  Musculoskeletal: Negative for gait problem  Objective:            /67   Pulse 76   Ht 5' 10" (1 778 m)   Wt 116 kg (256 lb)   BMI 36 73 kg/m²     Physical Exam  Vitals reviewed  Constitutional:       General: He is not in acute distress  Appearance: Normal appearance  He is not ill-appearing or toxic-appearing  Cardiovascular:      Rate and Rhythm: Normal rate and regular rhythm  Pulses: Normal pulses  Pulmonary:      Effort: Pulmonary effort is normal  No respiratory distress  Musculoskeletal:         General: Deformity present   No tenderness or signs of injury  Skin:     General: Skin is warm  Coloration: Skin is not cyanotic or mottled  Findings: No abscess, erythema or rash  Nails: There is no clubbing  Comments: Mild keratosis noted left submet 2 area  Ulcer is healed under the lesion  No drainage  No cellulitis  No purulence or signs of infection  Neurological:      General: No focal deficit present  Mental Status: He is alert and oriented to person, place, and time  Cranial Nerves: No cranial nerve deficit  Sensory: Sensory deficit present  Coordination: Coordination normal    Psychiatric:         Mood and Affect: Mood normal          Behavior: Behavior normal          Thought Content:  Thought content normal          Judgment: Judgment normal  None

## 2022-12-15 ENCOUNTER — HOSPITAL ENCOUNTER (OUTPATIENT)
Dept: CT IMAGING | Facility: HOSPITAL | Age: 67
End: 2022-12-15
Attending: ORTHOPAEDIC SURGERY

## 2022-12-15 DIAGNOSIS — G89.29 CHRONIC LEFT SHOULDER PAIN: ICD-10-CM

## 2022-12-15 DIAGNOSIS — M25.512 CHRONIC LEFT SHOULDER PAIN: ICD-10-CM

## 2022-12-15 DIAGNOSIS — M19.012 PRIMARY OSTEOARTHRITIS OF LEFT SHOULDER: ICD-10-CM

## 2022-12-23 ENCOUNTER — TELEPHONE (OUTPATIENT)
Dept: OBGYN CLINIC | Facility: OTHER | Age: 67
End: 2022-12-23

## 2022-12-23 NOTE — TELEPHONE ENCOUNTER
I called patient today 12/23 to see if he wants to move up his surgery date, patient declined and will keep his surgery date in April

## 2023-01-10 ENCOUNTER — OFFICE VISIT (OUTPATIENT)
Dept: PODIATRY | Facility: CLINIC | Age: 68
End: 2023-01-10

## 2023-01-10 VITALS
HEIGHT: 70 IN | BODY MASS INDEX: 36.82 KG/M2 | SYSTOLIC BLOOD PRESSURE: 130 MMHG | DIASTOLIC BLOOD PRESSURE: 60 MMHG | WEIGHT: 257.2 LBS

## 2023-01-10 DIAGNOSIS — Z79.4 TYPE 2 DIABETES MELLITUS WITH DIABETIC NEUROPATHY, WITH LONG-TERM CURRENT USE OF INSULIN (HCC): ICD-10-CM

## 2023-01-10 DIAGNOSIS — E11.40 TYPE 2 DIABETES MELLITUS WITH DIABETIC NEUROPATHY, WITH LONG-TERM CURRENT USE OF INSULIN (HCC): ICD-10-CM

## 2023-01-10 DIAGNOSIS — L97.521 ULCER OF LEFT FOOT, LIMITED TO BREAKDOWN OF SKIN (HCC): Primary | ICD-10-CM

## 2023-01-10 NOTE — PROGRESS NOTES
Patient ID: Linette Velazquez is a 69 y o  male Date of Birth 1955       Assessment:    No problem-specific Assessment & Plan notes found for this encounter  Diagnoses and all orders for this visit:    Ulcer of left foot, limited to breakdown of skin ([facility] [address] )    Type 2 diabetes mellitus with diabetic neuropathy, with long-term current use of insulin ([facility] [address] )          Procedures      Plan:  1  Reviewed medical records  2  Removed keratosis  Instructed skin care and protection  Instructed protective dressing daily  3  Continue diabetic shoes  Examine his feet bid at home  4  Pt to call the office if any signs of infection develop or significant change in appearance of wound  5  RA in 4 weeks  Subjective:        HPI  Martín Freeman presents for evaluation of left foot  No drainage  No pain  No redness or edema  Tolerates diabetic shoes well  BS under control  No new complaint  The following portions of the patient's history were reviewed and updated as appropriate: allergies, current medications, past family history, past medical history, past social history, past surgical history and problem list     Review of Systems   Constitutional: Negative for chills and fever  Respiratory: Negative for shortness of breath  Cardiovascular: Negative for chest pain  Gastrointestinal: Negative for diarrhea, nausea and vomiting  Musculoskeletal: Negative for gait problem  Objective:            /60   Ht 5' 10" (1 778 m) Comment: verbal  Wt 117 kg (257 lb 3 2 oz)   BMI 36 90 kg/m²     Physical Exam  Vitals reviewed  Constitutional:       General: He is not in acute distress  Appearance: Normal appearance  He is not ill-appearing or toxic-appearing  Cardiovascular:      Rate and Rhythm: Normal rate and regular rhythm  Pulses: Normal pulses  Pulmonary:      Effort: Pulmonary effort is normal  No respiratory distress  Musculoskeletal:         General: Deformity present  No tenderness or signs of injury  Skin:     General: Skin is warm  Coloration: Skin is not cyanotic or mottled  Findings: No abscess, erythema or rash  Nails: There is no clubbing  Comments: Mild keratosis noted left submet 2 area  Ulcer remains healed under the lesion  No drainage  No cellulitis  No signs of infection  Neurological:      General: No focal deficit present  Mental Status: He is alert and oriented to person, place, and time  Cranial Nerves: No cranial nerve deficit  Sensory: Sensory deficit present  Coordination: Coordination normal    Psychiatric:         Mood and Affect: Mood normal          Behavior: Behavior normal          Thought Content:  Thought content normal          Judgment: Judgment normal

## 2023-01-18 ENCOUNTER — CONSULT (OUTPATIENT)
Dept: GASTROENTEROLOGY | Facility: CLINIC | Age: 68
End: 2023-01-18

## 2023-01-18 VITALS
BODY MASS INDEX: 37.34 KG/M2 | SYSTOLIC BLOOD PRESSURE: 138 MMHG | DIASTOLIC BLOOD PRESSURE: 56 MMHG | WEIGHT: 260.8 LBS | HEIGHT: 70 IN | HEART RATE: 57 BPM

## 2023-01-18 DIAGNOSIS — Z11.59 ENCOUNTER FOR SCREENING FOR OTHER VIRAL DISEASES: ICD-10-CM

## 2023-01-18 DIAGNOSIS — K70.30 ALCOHOLIC CIRRHOSIS OF LIVER WITHOUT ASCITES (HCC): Primary | ICD-10-CM

## 2023-01-18 DIAGNOSIS — D69.6 THROMBOCYTOPENIA (HCC): ICD-10-CM

## 2023-01-18 DIAGNOSIS — R79.89 ELEVATED LFTS: ICD-10-CM

## 2023-01-18 NOTE — PROGRESS NOTES
[facility] - Outpatient Consultation  Milagros Grade 69 y o  male MRN: [id number]  Encounter: [id number]          ASSESSMENT AND PLAN:      1  Alcoholic cirrhosis of liver without ascites ([facility] )  -     US abdomen complete; Future; Expected date: 01/18/2023  -     US elastography; Future; Expected date: 01/18/2023  -     Iron Panel (Includes Ferritin, Iron Sat%, Iron, and TIBC); Future  -     AFP tumor marker; Future  -     CBC and differential; Future  -     LIAN Screen w/ Reflex to Titer/Pattern; Future  -     Protime-INR; Future  -     Chronic Hepatitis Panel; Future  -     Hepatic function panel; Future  -     Gamma GT; Future    2  Elevated LFTs  -     US abdomen complete; Future; Expected date: 01/18/2023  -     US elastography; Future; Expected date: 01/18/2023  -     Iron Panel (Includes Ferritin, Iron Sat%, Iron, and TIBC); Future  -     AFP tumor marker; Future  -     CBC and differential; Future  -     LIAN Screen w/ Reflex to Titer/Pattern; Future  -     Protime-INR; Future  -     Chronic Hepatitis Panel; Future  -     Hepatic function panel; Future  -     Gamma GT; Future    3  Thrombocytopenia ([facility] )    4  BMI 37 0-37 9, adult    5  Encounter for screening for other viral diseases  -     Chronic Hepatitis Panel; Future      Patient has elevated LFTs, AST 48 ALT 49  2T bili 1 2, clinical history is suggestive of alcoholic liver disease but these numbers are not  May be combination of alcoholic liver disease plus fatty liver  Any other etiology of liver disease remains a diagnosis of concern as well  Will check ultrasound, elastography, repeat labs, check for hepatitis BC LIAN iron panel  Renal complications of advanced liver disease and risk of liver cancer discussed  He does have thrombocytopenia platelets 50 K, WBC 3, which may be consistent with hypersplenism and indicate fibrosis/cirrhosis  Last colonoscopy August 2018, was unremarkable    If evidence of cirrhosis then may need EGD to check for varices  Cussed over length with patient, follow-up in my office in 2 to 3 months     ______________________________________________________________________    HPI:      Patient came in for evaluation of his abnormal liver function tests, patient denies any known history of liver disease  He used to drink fairly heavy alcohol 3-4 drinks per day according to him and stopped last August   He has not had any drinks since  Denies any history of jaundice, abdominal pain, swelling of the abdomen or feet, does not bleed or bruise easily  Denies any dysphagia coughing choking spells nocturnal reflux vegetation bronchitis pneumonias, bowels are regular, no melena hematochezia or apparent blood in stools  Denies any chest pain shortness of breath fever chills rash, trying to watch his diet and weight, blood sugars in the range of 110  No known history of CVA CAD CAD stents pacemakers or major abdominal surgery  Diet medications more than 10 systems reviewed  No known family history of GI or liver malignancies    REVIEW OF SYSTEMS:    CONSTITUTIONAL: Denies any fever, chills, rigors, and weight loss  HEENT: No earache or tinnitus  CARDIOVASCULAR: No chest pain or palpitations  RESPIRATORY: Denies any cough, hemoptysis, shortness of breath or dyspnea on exertion  GASTROINTESTINAL: As noted in the History of Present Illness  GENITOURINARY: Denies any hematuria or dysuria  NEUROLOGIC: No dizziness or vertigo  MUSCULOSKELETAL: Denies any joint swellings  SKIN: Denies skin rashes or itching  ENDOCRINE: Denies excessive thirst  Denies intolerance to heat or cold  PSYCHOSOCIAL: Denies depression or anxiety  Denies any recent memory loss  Historical Information   Past Medical History:   Diagnosis Date   • Arrhythmia    • Diabetes mellitus ([facility] [address] )    • History of echocardiogram 11/14/2017    showed EF of 50-55 percentWith moderate LVH and left ventricle diastolic dysfunction  Left atrium was moderately enlarged  Trace MR noted  • History of Holter monitoring 2017    showed baseline rhythm of sinus origin with an average heart it of 61 bpm  The lowest heart rate was 49 and the highest heart rate was 10 8 bpm  There were rare single VPCs, and frequent PACs representing 3 2% of total beats  There were several episodes of sinus arrhythmias with sinus bradycardia and heart rate ranging from 40-90 bpm  No sustained dysrhythmias, or pauses noted   The patient did not   • Hypertension    • Obese      Past Surgical History:   Procedure Laterality Date   • EYE SURGERY Left    • HERNIA REPAIR  [id number]   • KNEE ARTHROPLASTY Right    • SHOULDER SURGERY Right      Social History   Social History     Substance and Sexual Activity   Alcohol Use Not Currently    Comment: quit      Social History     Substance and Sexual Activity   Drug Use Never     Social History     Tobacco Use   Smoking Status Former   • Packs/day: 0 50   • Years:    • Pack years: 10    • Types: Cigarettes   • Quit date:    • Years since quittin 0   Smokeless Tobacco Never     Family History   Problem Relation Age of Onset   • Diabetes Mother    • Supraventricular tachycardia Mother    • COPD Father    • Heart disease Father    • Other Father         Sepsis; related to UTI with complicating cardiac problems   • Heart disease Paternal Grandmother        Meds/Allergies       Current Outpatient Medications:   •  Empagliflozin 25 MG TABS  •  glimepiride (AMARYL) 4 mg tablet  •  Insulin Glargine Solostar (Lantus SoloStar) 100 UNIT/ML SOPN  •  Insulin Pen Needle (BD Pen Needle Nayla 2nd Gen) 32G X 4 MM MISC  •  losartan (COZAAR) 50 mg tablet  •  Multiple Vitamin (multivitamin) capsule  •  traZODone (DESYREL) 50 mg tablet  •  triamterene-hydrochlorothiazide (MAXZIDE-25) 37 5-25 mg per tablet  •  Dilt- MG 24 hr capsule  •  diltiazem (CARDIZEM CD) 180 mg 24 hr capsule    Allergies   Allergen Reactions   • Sulfa Antibiotics            Objective     Blood pressure 138/56, pulse 57, height 5' 10" (1 778 m), weight 118 kg (260 lb 12 8 oz)  Body mass index is 37 42 kg/m²  PHYSICAL EXAM:      General Appearance:   Alert, cooperative, no distress   HEENT:   Normocephalic, atraumatic, anicteric  Neck:  Supple, symmetrical, trachea midline   Lungs:   Clear to auscultation bilaterally; no rales, rhonchi or wheezing; respirations unlabored    Heart[de-identified]   Regular rate and rhythm; no murmur  Abdomen:   Soft, non-tender, non-distended; normal bowel sounds; no masses, no organomegaly    Genitalia:   Deferred    Rectal:   Deferred    Extremities:  No cyanosis, clubbing or edema    Skin:  No jaundice, rashes, or lesions    Lymph nodes:  No palpable cervical lymphadenopathy        Lab Results:   No visits with results within 1 Day(s) from this visit  Latest known visit with results is:   Orders Only on 11/18/2022   Component Date Value   • Hemoglobin A1C 11/18/2022 7 2          Radiology Results:   No results found

## 2023-02-08 ENCOUNTER — OFFICE VISIT (OUTPATIENT)
Dept: PODIATRY | Facility: CLINIC | Age: 68
End: 2023-02-08

## 2023-02-08 VITALS
HEART RATE: 56 BPM | HEIGHT: 70 IN | SYSTOLIC BLOOD PRESSURE: 104 MMHG | BODY MASS INDEX: 36.94 KG/M2 | DIASTOLIC BLOOD PRESSURE: 60 MMHG | WEIGHT: 258 LBS

## 2023-02-08 DIAGNOSIS — E11.40 TYPE 2 DIABETES MELLITUS WITH DIABETIC NEUROPATHY, WITH LONG-TERM CURRENT USE OF INSULIN (HCC): ICD-10-CM

## 2023-02-08 DIAGNOSIS — L97.521 ULCER OF LEFT FOOT, LIMITED TO BREAKDOWN OF SKIN (HCC): Primary | ICD-10-CM

## 2023-02-08 DIAGNOSIS — Z79.4 TYPE 2 DIABETES MELLITUS WITH DIABETIC NEUROPATHY, WITH LONG-TERM CURRENT USE OF INSULIN (HCC): ICD-10-CM

## 2023-02-08 NOTE — PROGRESS NOTES
Patient ID: Marti Woods is a 69 y o  male Date of Birth 1955       Assessment:    No problem-specific Assessment & Plan notes found for this encounter  Diagnoses and all orders for this visit:    Ulcer of left foot, limited to breakdown of skin ([facility] [address] )    Type 2 diabetes mellitus with diabetic neuropathy, with long-term current use of insulin ([facility] [address] )          Procedures      Plan:  1  Reviewed medical records  2  Removed keratosis  Instructed skin care and protection  Instructed protective dressing daily  3  Applied horse shoe pad for more off-loading  Sent him for carbon fiber insole  Continue diabetic shoes  Examine his feet bid at home  4  Pt to call the office if any signs of infection develop or significant change in appearance of wound  5  RA in 4 weeks  Subjective:        HPI  Trey Jaime presents for evaluation of left foot  No drainage  No pain  BS under control  No new complaint  He presents with diabetic shoes  The following portions of the patient's history were reviewed and updated as appropriate: allergies, current medications, past family history, past medical history, past social history, past surgical history and problem list     Review of Systems   Constitutional: Negative for chills and fever  Respiratory: Negative for shortness of breath  Cardiovascular: Negative for chest pain  Gastrointestinal: Negative for diarrhea, nausea and vomiting  Musculoskeletal: Negative for gait problem  Neurological: Positive for numbness  Objective:            /60 (BP Location: Left arm, Patient Position: Sitting, Cuff Size: Large)   Pulse 56   Ht 5' 10" (1 778 m)   Wt 117 kg (258 lb)   BMI 37 02 kg/m²     Physical Exam  Vitals reviewed  Constitutional:       General: He is not in acute distress  Appearance: Normal appearance  He is not ill-appearing or toxic-appearing  Cardiovascular:      Rate and Rhythm: Normal rate and regular rhythm  Pulses: Normal pulses  Pulmonary:      Effort: Pulmonary effort is normal  No respiratory distress  Musculoskeletal:         General: Deformity present  No tenderness or signs of injury  Skin:     General: Skin is warm  Coloration: Skin is not cyanotic or mottled  Findings: No abscess, erythema or rash  Nails: There is no clubbing  Comments: Mild keratosis noted left submet 2 area  Ulcer remains healed under the lesion  No drainage  No cellulitis  No signs of infection  Neurological:      General: No focal deficit present  Mental Status: He is alert and oriented to person, place, and time  Cranial Nerves: No cranial nerve deficit  Sensory: Sensory deficit present  Coordination: Coordination normal    Psychiatric:         Mood and Affect: Mood normal          Behavior: Behavior normal          Thought Content:  Thought content normal          Judgment: Judgment normal

## 2023-02-14 ENCOUNTER — HOSPITAL ENCOUNTER (OUTPATIENT)
Dept: RADIOLOGY | Age: 68
Discharge: HOME | End: 2023-02-14

## 2023-02-14 DIAGNOSIS — R79.89 ELEVATED LFTS: ICD-10-CM

## 2023-02-14 DIAGNOSIS — K70.30 ALCOHOLIC CIRRHOSIS OF LIVER WITHOUT ASCITES (HCC): ICD-10-CM

## 2023-02-16 NOTE — RESULT ENCOUNTER NOTE
Please call the patient regarding his abnormal result  Ultrasound suggest some fatty liver and scarring in the liver  He also has gallstones  Schedule an office visit with me in about 2 months    Follow up in my office as needed

## 2023-03-17 LAB — HCV AB SER-ACNC: NEGATIVE

## 2023-03-20 ENCOUNTER — EVALUATION (OUTPATIENT)
Dept: PHYSICAL THERAPY | Facility: CLINIC | Age: 68
End: 2023-03-20

## 2023-03-20 ENCOUNTER — APPOINTMENT (OUTPATIENT)
Dept: LAB | Facility: CLINIC | Age: 68
End: 2023-03-20

## 2023-03-20 DIAGNOSIS — M19.012 PRIMARY OSTEOARTHRITIS OF LEFT SHOULDER: Primary | ICD-10-CM

## 2023-03-20 DIAGNOSIS — M25.512 CHRONIC LEFT SHOULDER PAIN: ICD-10-CM

## 2023-03-20 DIAGNOSIS — M19.012 PRIMARY OSTEOARTHRITIS OF LEFT SHOULDER: ICD-10-CM

## 2023-03-20 DIAGNOSIS — G89.29 CHRONIC LEFT SHOULDER PAIN: ICD-10-CM

## 2023-03-20 LAB
ALBUMIN SERPL BCP-MCNC: 3.8 G/DL (ref 3.5–5)
ALP SERPL-CCNC: 132 U/L (ref 46–116)
ALT SERPL W P-5'-P-CCNC: 56 U/L (ref 12–78)
ANION GAP SERPL CALCULATED.3IONS-SCNC: 3 MMOL/L (ref 4–13)
AST SERPL W P-5'-P-CCNC: 44 U/L (ref 5–45)
BASOPHILS # BLD AUTO: 0.03 THOUSANDS/ÂΜL (ref 0–0.1)
BASOPHILS NFR BLD AUTO: 1 % (ref 0–1)
BILIRUB SERPL-MCNC: 0.9 MG/DL (ref 0.2–1)
BUN SERPL-MCNC: 30 MG/DL (ref 5–25)
CALCIUM SERPL-MCNC: 10.7 MG/DL (ref 8.3–10.1)
CHLORIDE SERPL-SCNC: 107 MMOL/L (ref 96–108)
CO2 SERPL-SCNC: 26 MMOL/L (ref 21–32)
CREAT SERPL-MCNC: 1.19 MG/DL (ref 0.6–1.3)
EOSINOPHIL # BLD AUTO: 0.24 THOUSAND/ÂΜL (ref 0–0.61)
EOSINOPHIL NFR BLD AUTO: 7 % (ref 0–6)
ERYTHROCYTE [DISTWIDTH] IN BLOOD BY AUTOMATED COUNT: 13.3 % (ref 11.6–15.1)
GFR SERPL CREATININE-BSD FRML MDRD: 61 ML/MIN/1.73SQ M
GLUCOSE P FAST SERPL-MCNC: 153 MG/DL (ref 65–99)
HCT VFR BLD AUTO: 39.5 % (ref 36.5–49.3)
HGB BLD-MCNC: 12.4 G/DL (ref 12–17)
IMM GRANULOCYTES # BLD AUTO: 0.01 THOUSAND/UL (ref 0–0.2)
IMM GRANULOCYTES NFR BLD AUTO: 0 % (ref 0–2)
LYMPHOCYTES # BLD AUTO: 0.61 THOUSANDS/ÂΜL (ref 0.6–4.47)
LYMPHOCYTES NFR BLD AUTO: 18 % (ref 14–44)
MCH RBC QN AUTO: 29 PG (ref 26.8–34.3)
MCHC RBC AUTO-ENTMCNC: 31.4 G/DL (ref 31.4–37.4)
MCV RBC AUTO: 92 FL (ref 82–98)
MONOCYTES # BLD AUTO: 0.29 THOUSAND/ÂΜL (ref 0.17–1.22)
MONOCYTES NFR BLD AUTO: 9 % (ref 4–12)
NEUTROPHILS # BLD AUTO: 2.23 THOUSANDS/ÂΜL (ref 1.85–7.62)
NEUTS SEG NFR BLD AUTO: 65 % (ref 43–75)
NRBC BLD AUTO-RTO: 0 /100 WBCS
PLATELET # BLD AUTO: 61 THOUSANDS/UL (ref 149–390)
POTASSIUM SERPL-SCNC: 4.9 MMOL/L (ref 3.5–5.3)
PROT SERPL-MCNC: 7.7 G/DL (ref 6.4–8.4)
RBC # BLD AUTO: 4.28 MILLION/UL (ref 3.88–5.62)
SODIUM SERPL-SCNC: 136 MMOL/L (ref 135–147)
WBC # BLD AUTO: 3.41 THOUSAND/UL (ref 4.31–10.16)

## 2023-03-20 NOTE — PATIENT INSTRUCTIONS
Contact surgical nurse  navigator with any questions regarding preoperative plan or schedule    Stop all over the counter supplements, herbal, naturopathic  medications for 1 week prior to surgery UNLESS prescribed by your surgeon  Hold NSAIDS (i e  advil, alleve, motrin, ibuprofen, celebrex) minimum 7 days prior to surgery  Hold Asprin minimum 7 days prior to surgery  Recommend using Tylenol ( acetaminophen ) 500mg every eight hours during the first week post discharge in conjunction with any additional pain medicine prescribed by your surgeon  Use bowel medicines prescribed by your surgeon ( colace) daily post op during the first 1-2 weeks to avoid post operative constipation issues  Call [phone] with any post discharge concerns or medical issues  The morning of surgery take only the following medication with small sip of water: diltiazem  Hold losartan/maxzide the day prior to surgery and the day of surgery  Hold glimepiride the morning of surgery  Take 1/2 dose of insulin the day prior to surgery    Surgical scheduler will call you regarding hematology consultation  Follow up with podiatry as scheduled

## 2023-03-20 NOTE — PROGRESS NOTES
PT Evaluation     Today's date: 3/20/2023  Patient name: Rae West  : 1955  MRN: [id number]  Referring provider: Bibiana Campos  Dx:   Encounter Diagnosis     ICD-10-CM    1  Primary osteoarthritis of left shoulder  M19 012 Ambulatory referral to Physical Therapy      2  Chronic left shoulder pain  M25 512     G89 29                      Assessment  Assessment details: Rae West is a 69 y o  male who presents with pain, decreased strength, decreased ROM, decreased joint mobility and postural dysfunction  Due to these impairments, patient has difficulty performing ADL's, recreational activities, engaging in social activities, lifting/carrying, reaching  Patient's clinical presentation is consistent with their referring diagnosis of Chronic left shoulder pain  (primary encounter diagnosis) due to OA  Patient has been educated in post-op contraindications / precautions and surgical procedure, home exercise program and plan of care  Patient will be undergoing RTSA on 23 and will return for post op PT on 4/10/23  Patient would benefit from skilled physical therapy services to address their aforementioned functional limitations and progress towards prior level of function and independence with home exercise program   Impairments: abnormal or restricted ROM, abnormal movement, activity intolerance, impaired physical strength, lacks appropriate home exercise program, pain with function, poor posture  and poor body mechanics    Goals  Short Term Goals to be accomplished in 4 weeks after post op IE on 23:  STG1: Pt will be I with HEP to maximize progress between therapy sessions  STG2: Pt will be I with posture management   STG3: Pt will demo inc in shoulder AROM to improve self care and household ADLs    STG5: Pt will report pain 5/10 at worst in shoulder     Long Term Goals to be accomplished in 8 weeks after post op IE on 23:   LTG1: Pt will demo L shldr AROM to [facility] including flex: 120 deg, ER 50 deg  LTG2: Pt will return to work/household ADLs pain free as per PLOF  LTG3: Pt will demo shoulder strength WFL to allow lifting/carrying of 3# items  Plan  Plan details: HEP development, stretching, strengthening, A/AA/PROM, joint mobilizations, posture education, STM/MI as needed to reduce muscle tension, muscle reeducation, PLOC discussed and agreed upon with patient  Patient would benefit from: PT eval and skilled physical therapy  Planned modality interventions: cryotherapy and thermotherapy: hydrocollator packs  Planned therapy interventions: home exercise program, therapeutic exercise, therapeutic activities, self care, patient education, manual therapy and neuromuscular re-education  Frequency: 2x week (after TSA on 23)  Plan of Care beginning date: 3/20/2023  Plan of Care expiration date: 2023  Treatment plan discussed with: patient        Subjective Evaluation    History of Present Illness  Mechanism of injury: Patient reports chronic L shldr pain that started approx 1 year ago  Notes he had a dislocation in  that caused OA in the joint  Pt reports he has had injection that did not help  Notes orthopedic started his OA has advanced to the point where a TSA is appropriate  Limited in most ADL's and IADL's due to pain and decreased motion of shoulder     Pain  Current pain ratin  At best pain ratin  At worst pain rating: 10  Location: top of shoulder, between shoulder blades   Quality: discomfort, knife-like and sharp  Relieving factors: change in position  Aggravating factors: lifting and overhead activity (most activities involving L arm, putting on coat, tucking in shirt, closing car door, oputitng on cpap, driving tractor)  Progression: no change    Social Support    Employment status: working (office work )  Hand dominance: right  Exercise history: boating, farming     Treatments  Previous treatment: injection treatment and physical therapy  Patient Goals  Patient goals for therapy: decreased pain, increased strength, independence with ADLs/IADLs, return to sport/leisure activities and increased motion  Patient goal: return to ADL's         Objective       Posture: forward head w/ rounded shoulders, L scap depression     AROM: standing R   L  Shoulder flex        150   90* w/ scap elevation  Shoulder abd       145   65*  functional ER  Behind head  unable  Functional IR  Sacrum  L glute    PROM:  R  L  Shoulder flex        160  90*  Shoulder abd       160  65*  ER @ neutral abd 60  0  IR @ neutral abd 60  25    Cervical AROM loss: WNL     MMT:    R  L     Shoulder flex  4/5*  3+/5*  Shoulder abd  4/5*  3/5*  IR   5/5  3+/5  ER   4-/5*  3+/5*    Mechanical assessment:   N/A    Tenderness/Palpation:  WNL    Joint Mobility:   Severe hypomobility L GHJ all directions        Precautions:   Past Medical History:   Diagnosis Date   • Arrhythmia    • Diabetes mellitus ([facility] [address] )    • History of echocardiogram 11/14/2017    showed EF of 50-55 percentWith moderate LVH and left ventricle diastolic dysfunction  Left atrium was moderately enlarged  Trace MR noted  • History of Holter monitoring 11/21/2017    showed baseline rhythm of sinus origin with an average heart it of 61 bpm  The lowest heart rate was 49 and the highest heart rate was 10 8 bpm  There were rare single VPCs, and frequent PACs representing 3 2% of total beats  There were several episodes of sinus arrhythmias with sinus bradycardia and heart rate ranging from 40-90 bpm  No sustained dysrhythmias, or pauses noted   The patient did not   • Hypertension    • Obese        SOC: 3/20/222  FOTO: TBA on post op IE  POC Expiration: 6/12/2022  Daily Treatment Log:  Date 3/20/2023       Visit # 1 pre-op       Manual                        Ther Exer                                                                 HEP        Ther Activ                                                        NMReed Modalities

## 2023-03-20 NOTE — PROGRESS NOTES
Internal Medicine Pre-Operative Evaluation:     Reason for Visit: Pre-operative Evaluation for Risk Stratification and Optimization    Patient ID: Karissa Mckeon is a 69 y o  male  Surgery: Arthroplasty of left Shoulder  Referring Provider: Dr Francesca Gastelum      Recommendations to Proceed withSurgery    Patient is considered to be Low risk for Medium risk procedure  After evaluation and discussion with patient with emphasis that all surgery has some degree of inherent risk it is determined this procedure is of acceptable risk  Medically  He is aware of the risk of bleeding d/t his chronically low platelet level, as well as the risk of poor wound healing d/t his current hgb A1c values  He also is aware of his risk of infection d/t the chronic LLE wound which is currently stable and under the care of podiatry  Patient may proceed with planned procedure if OK with Dr Francesca Gastelum and he receives clearance by Hematology  Assessment      Primary osteoarthritis left Shoulder  • Failed conservative measures  • Electing to undergo total joint arthroplasty    Pre-operative Medical Evaluation for planned surgery  • Patient meets preoperative quality goals as noted below  • Recommendations as listed in PLAN section below  • Contact surgical nurse  navigator with any questions regarding preoperative plan or schedule      Etoh cirrhosis  · Currently undergoing w/u by GI  · Recommend EGD to r/o varices  · Has f/u scheduled    Iron deficiency anemia  · chronic    Thrombocytopenia  · Recent platelet level 53  · Chronic  · Saw hematology back in 2021  · Will need clearance  · 2/2 cirrhosis      DM  • Hgb A1c 8 4  • This falls above the hardstop for [employer]  • Recently on vacation prior to labs  • Previously his A1c's have fallen in the 7 0-7 3 range  • He also states his BS have been in the 150 range with FBS around 120  • He continues to test his BS several times per day  • Goal is to keep BS <150  • He was instructed to hold glimepiride the morning of surgery and take 1/2 dose of lantus the night prior to surgery    Chronic L foot wound  · F/b podiatry  · Remains closed at this point a/t last note  · He has scheduled appt next wk for f/u as well as for clearance    HTN  • Stable  • Cont current medications as directed  • Monitor post operative BP   • Hold maxzide/losartan the day prior to surgery and the morning of surgery  • He received cardiac clearance back in November and has not had any change in symptoms since that time    Obesity  • Recommend ongoing attempts at weight loss  • Current BMI 36 9    Etoh use  · Chronic  · No longer drinking  · Being followed by GI          Patient Active Problem List   Diagnosis   • Alcohol abuse   • Type 2 diabetes mellitus ([email or web address] )   • Benign essential HTN   • ABILIO (acute kidney injury) ([email or web address] )   • Cholelithiasis   • Alcoholic cirrhosis ([facility] )   • MEG (obstructive sleep apnea)   • Anemia   • Thrombocytopenia (HCC)   • Insomnia   • Elevated troponin   • Chest pressure   • Diabetic ulcer of left foot associated with type 2 diabetes mellitus (HCC)   • Leukopenia   • Cellulitis   • Obesity, morbid ([facility] [facility] )   • Primary osteoarthritis of left shoulder   • Body mass index (BMI) of 36 0-36 9 in adult        Plan:     1  Further preoperative workup as follows:   - patient should see consultants as below and return to [facility] for further instruction afterward    2   Medication Management/Recommendations:   - continue all current medicines through morning of surgery with sip of water except the following:  - hold ACE / ARB specifically  24 hours prior to surgery  - hold diuretic / water pill  24 hours prior to surgery  - iInsulin Management: take 1/2 lantus the evening prior to surgery  - Hold the following oral diabetes medication morning of surgery: hold empagliflozin  - avoid use of NSAID such as motrin, advil, aleve for 7 days prior to surgery  - hold all OTC herbal or nutritional supplements 7 days before surgery    3  Patient requires further consultation with:   hematology/oncology    4  Discharge Planning / Barriers to Discharge  none identified - patients has post discharge therapy plan in place, transportation arranged for discharge day, adequate family support at home to assist with discharge to home  Subjective:           History of Present Illness:     Gib Cooks is a 69 y o  male who presents to the office today for a preoperative consultation at the request of surgeon  The patient understands this is an elective procedure and not emergent  They are electing to undergo planned procedure with an understanding that all surgery has inherent risk  They have worked with their surgeon and failed conservative treatment measures  Today they present for preoperative risk assessment and recommendations for optimization in preparation for surgery  Pt seen in surgical optimization center for upcoming proposed surgery  They have failed previous conservative measures and have elected surgical intervention  Pt does not meet presurgical lab hardstops d/t elevated hgb A1c of 8 4  HIs previous A1c's have been 7 0-7 3  He states that he was recently on vacation and did not eat well and that was likely the culprit  He states that he is back on track and his blood sugars are back to his previous baseline  His hgb and iron levels are low however he does have cirrhosis and is in the process of work up by GI  They are recommending EGD to r/o varices  His platelets are 53, he has not seen hematology in 2 years, he will need clearance from them as well  Upon interview questioning patient is able to perform greater than 4 METs workload in daily life without any reported cardio-pulmonary symptoms  The patient is anxious to have his shoulder surgery d/t persistent pain   He understands the risk in regards to his low platelets, chronic LE foot wound which is being followed by podiatry, as well as his elevated BS readings which he believes are soley d/t his indiscretions on vacation  At this point we will keep the surgical date, we will try and get hematology clearance prior to his date and we will discuss the above with Dr Keily Palomino  THe patient is in agreement with all of the information          ROS: No TIA's or unusual headaches, no dysphagia  No prolonged cough  No dyspnea or chest pain on exertion  No abdominal pain, change in bowel habits, black or bloody stools  No urinary tract or BPH symptoms  Positive reported pain in arthritic joint  Positive difficulty with gait  No skin rashes or issues  Objective:      /62   Pulse 80   Ht 5' 10" (1 778 m)   Wt 117 kg (257 lb 3 2 oz)   SpO2 99%   BMI 36 90 kg/m²       General Appearance: no distress, conversive  HEENT: PERRLA, conjuctiva normal; oropharynx clear; mucous membranes moist;   Neck:  Supple, no lymphadenopathy or thyromegaly  Lungs: breath sounds normal, normal respiratory effort, no retractions, expiratory effort normal  CV: normal heart sounds S1/S2, PMI normal   ABD: soft non tender, +BS x4; obese  EXT: DP pulses intact, no lymphadenopathy, no edema; LLE with nickel sized area on forefront of foot, no evidence of infection  Skin: normal turgor, normal texture, no rash  Psych: affect normal, mood normal  Neuro: AAOx3        The following portions of the patient's history were reviewed and updated as appropriate: allergies, current medications, past family history, past medical history, past social history, past surgical history and problem list      Past History:       Past Medical History:   Diagnosis Date   • Arrhythmia    • Diabetes mellitus ([facility] [facility] )    • History of echocardiogram 11/14/2017    showed EF of 50-55 percentWith moderate LVH and left ventricle diastolic dysfunction  Left atrium was moderately enlarged  Trace MR noted      • History of Holter monitoring 11/21/2017    showed baseline rhythm of sinus origin with an average heart it of 61 bpm  The lowest heart rate was 49 and the highest heart rate was 10 8 bpm  There were rare single VPCs, and frequent PACs representing 3 2% of total beats  There were several episodes of sinus arrhythmias with sinus bradycardia and heart rate ranging from 40-90 bpm  No sustained dysrhythmias, or pauses noted  The patient did not   • Hypertension    • Obese     Past Surgical History:   Procedure Laterality Date   • EYE SURGERY Left    • HERNIA REPAIR  [id number]   • KNEE ARTHROPLASTY Right    • SHOULDER SURGERY Right           Social History     Tobacco Use   • Smoking status: Former     Packs/day: 0 50     Years: 20 00     Pack years: 10 00     Types: Cigarettes     Quit date: 44     Years since quittin 2   • Smokeless tobacco: Never   Vaping Use   • Vaping Use: Never used   Substance Use Topics   • Alcohol use: Not Currently     Comment: quit    • Drug use: Never     Family History   Problem Relation Age of Onset   • Diabetes Mother    • Supraventricular tachycardia Mother    • COPD Father    • Heart disease Father    • Other Father         Sepsis; related to UTI with complicating cardiac problems   • Heart disease Paternal Grandmother           Allergies: Allergies   Allergen Reactions   • Sulfa Antibiotics         Current Medications:     Current Outpatient Medications   Medication Instructions   • diltiazem (CARDIZEM CD) 180 mg 24 hr capsule No dose, route, or frequency recorded  • glimepiride (AMARYL) 4 mg tablet No dose, route, or frequency recorded  • Insulin Glargine Solostar (LANTUS SOLOSTAR) 40 Units, Subcutaneous, Daily at bedtime   • Insulin Pen Needle (BD Pen Needle Nayla 2nd Gen) 32G X 4 MM MISC For use with insulin pen  Pharmacy may dispense brand covered by insurance  • losartan (COZAAR) 100 MG tablet No dose, route, or frequency recorded     • Multiple Vitamin (multivitamin) capsule 1 capsule, Oral, Daily   • traZODone (DESYREL) 50 mg tablet No dose, route, or frequency recorded  • triamterene-hydrochlorothiazide (DYAZIDE) 37 5-25 mg per capsule No dose, route, or frequency recorded  PRE-OP WORKSHEET DATA    Assessment of Pre-Operative Risks     ML Quality Hard Stops:  BMI (<40) : Estimated body mass index is 36 9 kg/m² as calculated from the following:    Height as of this encounter: 5' 10" (1 778 m)  Weight as of this encounter: 117 kg (257 lb 3 2 oz)  Hgb ( >11): Lab Results   Component Value Date    HGB 12 4 03/20/2023     HbA1c (<7 0) :   Lab Results   Component Value Date    HGBA1C 8 4 (H) 03/20/2023     GFR (>60) (Less then 45 = Nephrology consult):  Estimated Creatinine Clearance: 75 1 mL/min (by C-G formula based on SCr of 1 19 mg/dL)  Active Decompensated Chronic Conditions which would delay surgery  No acutely decompensated medical issues such as recent CVA, MI, new onset arrhythmia, severe aortic stenosis, CHF, uncontrolled COPD       Exercise Capacity: (if less the 4 mets consider functional assessment via cardiac stress testing or consultation)    · Able to walk 2 blocks without symptoms?: Yes  · Able to walk 1 flights without symptoms?: Yes    Assessment of intra and post operative respiratory, hemodynamic and thrombotic risks     Prior Anesthesia Reactions: No     Personal history of venous thromboembolic disease? No    History of steroid use > 5 mg for >2 weeks within last year? No    Cardiac Risk Estimation: per the Revised Cardiac Risk Index (Circ  [id number], 1999),     The patient's risk factors for cardiac complications include :  none    Annie Quiroz has a in hospital cardiac risk of RCI RISK CLASS I (0 risk factors, risk of major cardiac compl  appr  0 5%) based on RCRI calculator          Pre-Op Data Reviewed:       Laboratory Results: I have personally reviewed the pertinent laboratory results/reports     EKG:I have personally reviewed pertinent reports      I personally reviewed and interpreted available tracings in the electronic medical record    OLD RECORDS: reviewed old records in the chart review section if EHR on day of visit      Previous cardiopulmonary studies within the past year:  · Echocardiogram: no  · Cardiac Catheterization: no  · Stress Test: no      Time of visit including pre-visit chart review, visit and post-visit coordination of plan and care , review of pre-surgical lab work, preparation and time spent documenting note in electronic medical record, time spent face-to-face in physical examination answering patient questions by care team 55 minutes             [address]

## 2023-03-20 NOTE — H&P (VIEW-ONLY)
Internal Medicine Pre-Operative Evaluation:     Reason for Visit: Pre-operative Evaluation for Risk Stratification and Optimization    Patient ID: Uday Cruz is a 69 y o  male  Surgery: Arthroplasty of left Shoulder  Referring Provider: Dr Monet Friend      Recommendations to Proceed withSurgery    Patient is considered to be Low risk for Medium risk procedure  After evaluation and discussion with patient with emphasis that all surgery has some degree of inherent risk it is determined this procedure is of acceptable risk  Medically  He is aware of the risk of bleeding d/t his chronically low platelet level, as well as the risk of poor wound healing d/t his current hgb A1c values  He also is aware of his risk of infection d/t the chronic LLE wound which is currently stable and under the care of podiatry  Patient may proceed with planned procedure if OK with Dr Monet Friend and he receives clearance by Hematology  Assessment      Primary osteoarthritis left Shoulder  • Failed conservative measures  • Electing to undergo total joint arthroplasty    Pre-operative Medical Evaluation for planned surgery  • Patient meets preoperative quality goals as noted below  • Recommendations as listed in PLAN section below  • Contact surgical nurse  navigator with any questions regarding preoperative plan or schedule      Etoh cirrhosis  · Currently undergoing w/u by GI  · Recommend EGD to r/o varices  · Has f/u scheduled    Iron deficiency anemia  · chronic    Thrombocytopenia  · Recent platelet level 53  · Chronic  · Saw hematology back in 2021  · Will need clearance  · 2/2 cirrhosis      DM  • Hgb A1c 8 4  • This falls above the hardstop for [employer]  • Recently on vacation prior to labs  • Previously his A1c's have fallen in the 7 0-7 3 range  • He also states his BS have been in the 150 range with FBS around 120  • He continues to test his BS several times per day  • Goal is to keep BS <150  • He was instructed to hold glimepiride the morning of surgery and take 1/2 dose of lantus the night prior to surgery    Chronic L foot wound  · F/b podiatry  · Remains closed at this point a/t last note  · He has scheduled appt next wk for f/u as well as for clearance    HTN  • Stable  • Cont current medications as directed  • Monitor post operative BP   • Hold maxzide/losartan the day prior to surgery and the morning of surgery  • He received cardiac clearance back in November and has not had any change in symptoms since that time    Obesity  • Recommend ongoing attempts at weight loss  • Current BMI 36 9    Etoh use  · Chronic  · No longer drinking  · Being followed by GI          Patient Active Problem List   Diagnosis   • Alcohol abuse   • Type 2 diabetes mellitus ([facility] )   • Benign essential HTN   • ABILIO (acute kidney injury) ([facility] )   • Cholelithiasis   • Alcoholic cirrhosis ([facility] )   • MEG (obstructive sleep apnea)   • Anemia   • Thrombocytopenia (HCC)   • Insomnia   • Elevated troponin   • Chest pressure   • Diabetic ulcer of left foot associated with type 2 diabetes mellitus (HCC)   • Leukopenia   • Cellulitis   • Obesity, morbid ([facility] )   • Primary osteoarthritis of left shoulder   • Body mass index (BMI) of 36 0-36 9 in adult        Plan:     1  Further preoperative workup as follows:   - patient should see consultants as below and return to [facility] for further instruction afterward    2   Medication Management/Recommendations:   - continue all current medicines through morning of surgery with sip of water except the following:  - hold ACE / ARB specifically  24 hours prior to surgery  - hold diuretic / water pill  24 hours prior to surgery  - iInsulin Management: take 1/2 lantus the evening prior to surgery  - Hold the following oral diabetes medication morning of surgery: hold empagliflozin  - avoid use of NSAID such as motrin, advil, aleve for 7 days prior to surgery  - hold all OTC herbal or nutritional supplements 7 days before surgery    3  Patient requires further consultation with:   hematology/oncology    4  Discharge Planning / Barriers to Discharge  none identified - patients has post discharge therapy plan in place, transportation arranged for discharge day, adequate family support at home to assist with discharge to home  Subjective:           History of Present Illness:     Mike Tan is a 69 y o  male who presents to the office today for a preoperative consultation at the request of surgeon  The patient understands this is an elective procedure and not emergent  They are electing to undergo planned procedure with an understanding that all surgery has inherent risk  They have worked with their surgeon and failed conservative treatment measures  Today they present for preoperative risk assessment and recommendations for optimization in preparation for surgery  Pt seen in surgical optimization center for upcoming proposed surgery  They have failed previous conservative measures and have elected surgical intervention  Pt does not meet presurgical lab hardstops d/t elevated hgb A1c of 8 4  HIs previous A1c's have been 7 0-7 3  He states that he was recently on vacation and did not eat well and that was likely the culprit  He states that he is back on track and his blood sugars are back to his previous baseline  His hgb and iron levels are low however he does have cirrhosis and is in the process of work up by GI  They are recommending EGD to r/o varices  His platelets are 53, he has not seen hematology in 2 years, he will need clearance from them as well  Upon interview questioning patient is able to perform greater than 4 METs workload in daily life without any reported cardio-pulmonary symptoms  The patient is anxious to have his shoulder surgery d/t persistent pain   He understands the risk in regards to his low platelets, chronic LE foot wound which is being followed by podiatry, as well as his elevated BS "readings which he believes are soley d/t his indiscretions on vacation  At this point we will keep the surgical date, we will try and get hematology clearance prior to his date and we will discuss the above with Dr Sara Mooney  THe patient is in agreement with all of the information          ROS: No TIA's or unusual headaches, no dysphagia  No prolonged cough  No dyspnea or chest pain on exertion  No abdominal pain, change in bowel habits, black or bloody stools  No urinary tract or BPH symptoms  Positive reported pain in arthritic joint  Positive difficulty with gait  No skin rashes or issues  Objective:      /62   Pulse 80   Ht 5' 10\" (1 778 m)   Wt 117 kg (257 lb 3 2 oz)   SpO2 99%   BMI 36 90 kg/m²       General Appearance: no distress, conversive  HEENT: PERRLA, conjuctiva normal; oropharynx clear; mucous membranes moist;   Neck:  Supple, no lymphadenopathy or thyromegaly  Lungs: breath sounds normal, normal respiratory effort, no retractions, expiratory effort normal  CV: normal heart sounds S1/S2, PMI normal   ABD: soft non tender, +BS x4; obese  EXT: DP pulses intact, no lymphadenopathy, no edema; LLE with nickel sized area on forefront of foot, no evidence of infection  Skin: normal turgor, normal texture, no rash  Psych: affect normal, mood normal  Neuro: AAOx3        The following portions of the patient's history were reviewed and updated as appropriate: allergies, current medications, past family history, past medical history, past social history, past surgical history and problem list      Past History:       Past Medical History:   Diagnosis Date   • Arrhythmia    • Diabetes mellitus ([facility] )    • History of echocardiogram 11/14/2017    showed EF of 50-55 percentWith moderate LVH and left ventricle diastolic dysfunction  Left atrium was moderately enlarged  Trace MR noted      • History of Holter monitoring 11/21/2017    showed baseline rhythm of sinus origin with an average heart " it of 61 bpm  The lowest heart rate was 49 and the highest heart rate was 10 8 bpm  There were rare single VPCs, and frequent PACs representing 3 2% of total beats  There were several episodes of sinus arrhythmias with sinus bradycardia and heart rate ranging from 40-90 bpm  No sustained dysrhythmias, or pauses noted  The patient did not   • Hypertension    • Obese     Past Surgical History:   Procedure Laterality Date   • EYE SURGERY Left    • HERNIA REPAIR  [id number]   • KNEE ARTHROPLASTY Right    • SHOULDER SURGERY Right           Social History     Tobacco Use   • Smoking status: Former     Packs/day: 0 50     Years: 20 00     Pack years: 10 00     Types: Cigarettes     Quit date: 46     Years since quittin 2   • Smokeless tobacco: Never   Vaping Use   • Vaping Use: Never used   Substance Use Topics   • Alcohol use: Not Currently     Comment: quit    • Drug use: Never     Family History   Problem Relation Age of Onset   • Diabetes Mother    • Supraventricular tachycardia Mother    • COPD Father    • Heart disease Father    • Other Father         Sepsis; related to UTI with complicating cardiac problems   • Heart disease Paternal Grandmother           Allergies: Allergies   Allergen Reactions   • Sulfa Antibiotics         Current Medications:     Current Outpatient Medications   Medication Instructions   • diltiazem (CARDIZEM CD) 180 mg 24 hr capsule No dose, route, or frequency recorded  • glimepiride (AMARYL) 4 mg tablet No dose, route, or frequency recorded  • Insulin Glargine Solostar (LANTUS SOLOSTAR) 40 Units, Subcutaneous, Daily at bedtime   • Insulin Pen Needle (BD Pen Needle Nayla 2nd Gen) 32G X 4 MM MISC For use with insulin pen  Pharmacy may dispense brand covered by insurance  • losartan (COZAAR) 100 MG tablet No dose, route, or frequency recorded     • Multiple Vitamin (multivitamin) capsule 1 capsule, Oral, Daily   • traZODone (DESYREL) 50 mg tablet No dose, route, or "frequency recorded  • triamterene-hydrochlorothiazide (DYAZIDE) 37 5-25 mg per capsule No dose, route, or frequency recorded  PRE-OP WORKSHEET DATA    Assessment of Pre-Operative Risks     MLJ Quality Hard Stops:  BMI (<40) : Estimated body mass index is 36 9 kg/m² as calculated from the following:    Height as of this encounter: 5' 10\" (1 778 m)  Weight as of this encounter: 117 kg (257 lb 3 2 oz)  Hgb ( >11): Lab Results   Component Value Date    HGB 12 4 03/20/2023     HbA1c (<7 0) :   Lab Results   Component Value Date    HGBA1C 8 4 (H) 03/20/2023     GFR (>60) (Less then 45 = Nephrology consult):  Estimated Creatinine Clearance: 75 1 mL/min (by C-G formula based on SCr of 1 19 mg/dL)  Active Decompensated Chronic Conditions which would delay surgery  No acutely decompensated medical issues such as recent CVA, MI, new onset arrhythmia, severe aortic stenosis, CHF, uncontrolled COPD       Exercise Capacity: (if less the 4 mets consider functional assessment via cardiac stress testing or consultation)    · Able to walk 2 blocks without symptoms?: Yes  · Able to walk 1 flights without symptoms?: Yes    Assessment of intra and post operative respiratory, hemodynamic and thrombotic risks     Prior Anesthesia Reactions: No     Personal history of venous thromboembolic disease? No    History of steroid use > 5 mg for >2 weeks within last year? No    Cardiac Risk Estimation: per the Revised Cardiac Risk Index (Circ  100:1043, 1999),     The patient's risk factors for cardiac complications include :  none    Jesse Torres has a in hospital cardiac risk of RCI RISK CLASS I (0 risk factors, risk of major cardiac compl  appr  0 5%) based on RCRI calculator          Pre-Op Data Reviewed:       Laboratory Results: I have personally reviewed the pertinent laboratory results/reports     EKG:I have personally reviewed pertinent reports      I personally reviewed and interpreted available tracings in the " electronic medical record    OLD RECORDS: reviewed old records in the chart review section if EHR on day of visit      Previous cardiopulmonary studies within the past year:  · Echocardiogram: no  · Cardiac Catheterization: no  · Stress Test: no      Time of visit including pre-visit chart review, visit and post-visit coordination of plan and care , review of pre-surgical lab work, preparation and time spent documenting note in electronic medical record, time spent face-to-face in physical examination answering patient questions by care team 55 minutes             [address]

## 2023-03-21 ENCOUNTER — LAB REQUISITION (OUTPATIENT)
Dept: LAB | Facility: HOSPITAL | Age: 68
End: 2023-03-21

## 2023-03-21 ENCOUNTER — OFFICE VISIT (OUTPATIENT)
Dept: PODIATRY | Facility: CLINIC | Age: 68
End: 2023-03-21

## 2023-03-21 VITALS
HEART RATE: 73 BPM | BODY MASS INDEX: 37.02 KG/M2 | HEIGHT: 70 IN | SYSTOLIC BLOOD PRESSURE: 123 MMHG | DIASTOLIC BLOOD PRESSURE: 73 MMHG

## 2023-03-21 DIAGNOSIS — L97.521 ULCER OF LEFT FOOT, LIMITED TO BREAKDOWN OF SKIN (HCC): Primary | ICD-10-CM

## 2023-03-21 DIAGNOSIS — E11.40 TYPE 2 DIABETES MELLITUS WITH DIABETIC NEUROPATHY, WITH LONG-TERM CURRENT USE OF INSULIN (HCC): ICD-10-CM

## 2023-03-21 DIAGNOSIS — M19.012 PRIMARY OSTEOARTHRITIS, LEFT SHOULDER: ICD-10-CM

## 2023-03-21 DIAGNOSIS — Z79.4 TYPE 2 DIABETES MELLITUS WITH DIABETIC NEUROPATHY, WITH LONG-TERM CURRENT USE OF INSULIN (HCC): ICD-10-CM

## 2023-03-21 LAB
ABO GROUP BLD: NORMAL
BLD GP AB SCN SERPL QL: NEGATIVE
EST. AVERAGE GLUCOSE BLD GHB EST-MCNC: 194 MG/DL
HBA1C MFR BLD: 8.4 %
RH BLD: POSITIVE
SPECIMEN EXPIRATION DATE: NORMAL

## 2023-03-21 NOTE — PROGRESS NOTES
Patient ID: Bryan Alvarez is a 69 y o  male Date of Birth 1955       Assessment:    No problem-specific Assessment & Plan notes found for this encounter  Diagnoses and all orders for this visit:    Ulcer of left foot, limited to breakdown of skin ([facility] )    Type 2 diabetes mellitus with diabetic neuropathy, with long-term current use of insulin ([facility] )          Procedures      Plan:  1  Reviewed medical records  2  Removed keratosis  He has recurring ulcer on left foot  Applied horse shoe pad  Instructed daily local care  3   Return to [address]  Discussed proper off-loading and staying off of his feet  4   Recommended to obtain carbon fiber insole  Pt to call the office if any signs of infection develop or significant change in appearance of wound  5  RA in 1 week  Subjective:        RYAN Clemons presents for evaluation of left foot  No drainage  No pain  He noticed discoloration / bruises on left plantar foot  BS under control  He presents with diabetic shoes  He did not obtain carbon fiber insole  The following portions of the patient's history were reviewed and updated as appropriate: allergies, current medications, past family history, past medical history, past social history, past surgical history and problem list       PAST MEDICAL HISTORY:  Past Medical History:   Diagnosis Date   • Arrhythmia    • Diabetes mellitus ([facility] )    • History of echocardiogram 11/14/2017    showed EF of 50-55 percentWith moderate LVH and left ventricle diastolic dysfunction  Left atrium was moderately enlarged  Trace MR noted  • History of Holter monitoring 11/21/2017    showed baseline rhythm of sinus origin with an average heart it of 61 bpm  The lowest heart rate was 49 and the highest heart rate was 10 8 bpm  There were rare single VPCs, and frequent PACs representing 3 2% of total beats   There were several episodes of sinus arrhythmias with sinus bradycardia and heart rate ranging from 40-90 bpm  No sustained dysrhythmias, or pauses noted  The patient did not   • Hypertension    • Obese        PAST SURGICAL HISTORY:  Past Surgical History:   Procedure Laterality Date   • EYE SURGERY Left    • HERNIA REPAIR  [id number]   • KNEE ARTHROPLASTY Right    • SHOULDER SURGERY Right         ALLERGIES:  Sulfa antibiotics    MEDICATIONS:  Current Outpatient Medications   Medication Sig Dispense Refill   • Empagliflozin 25 MG TABS Take 25 mg by mouth daily     • Insulin Glargine Solostar (Lantus SoloStar) 100 UNIT/ML SOPN Inject 0 4 mL (40 Units total) under the skin daily at bedtime     • Insulin Pen Needle (BD Pen Needle Nayla 2nd Gen) 32G X 4 MM MISC For use with insulin pen  Pharmacy may dispense brand covered by insurance  100 each 0   • losartan (COZAAR) 50 mg tablet 100 mg     • Multiple Vitamin (multivitamin) capsule Take 1 capsule by mouth daily 21 capsule 0   • traZODone (DESYREL) 50 mg tablet      • triamterene-hydrochlorothiazide (MAXZIDE-25) 37 5-25 mg per tablet Take 1 tablet by mouth daily     • Dilt- MG 24 hr capsule  (Patient not taking: Reported on 11/15/2022)       No current facility-administered medications for this visit         SOCIAL HISTORY:  Social History     Socioeconomic History   • Marital status: /Civil Union     Spouse name: None   • Number of children: 2   • Years of education: 16   • Highest education level: Some college, no degree   Occupational History   • None   Tobacco Use   • Smoking status: Former     Packs/day: 0 50     Years: 20 00     Pack years: 10 00     Types: Cigarettes     Quit date:      Years since quittin 2   • Smokeless tobacco: Never   Vaping Use   • Vaping Use: Never used   Substance and Sexual Activity   • Alcohol use: Not Currently     Comment: quit    • Drug use: Never   • Sexual activity: Not Currently   Other Topics Concern   • None   Social History Narrative    · Most recent tobacco use screening: 03-      · Do you currently or have you served in [employer]:   No      · Live alone or with others:   with others      · High blood pressure: Yes      · Exercise level:   Occasional      · Overweight:   Yes      · Obese: Yes      · Diabetes:   Yes      Social Determinants of Health     Financial Resource Strain: Not on file   Food Insecurity: Not on file   Transportation Needs: Not on file   Physical Activity: Not on file   Stress: Not on file   Social Connections: Not on file   Intimate Partner Violence: Not on file   Housing Stability: Not on file      Review of Systems   Constitutional: Negative for chills and fever  Respiratory: Negative for shortness of breath  Cardiovascular: Negative for chest pain  Gastrointestinal: Negative for diarrhea, nausea and vomiting  Musculoskeletal: Negative for gait problem  Neurological: Positive for numbness  Objective:            /73 (BP Location: Left arm, Patient Position: Sitting, Cuff Size: Standard)   Pulse 73   Ht 5' 10" (1 778 m)   BMI 37 02 kg/m²     Physical Exam  Vitals reviewed  Constitutional:       General: He is not in acute distress  Appearance: Normal appearance  He is not ill-appearing or toxic-appearing  Cardiovascular:      Rate and Rhythm: Normal rate and regular rhythm  Pulses: Normal pulses  Pulmonary:      Effort: Pulmonary effort is normal  No respiratory distress  Musculoskeletal:         General: Deformity present  No tenderness or signs of injury  Skin:     General: Skin is warm  Coloration: Skin is not cyanotic or mottled  Findings: No abscess, erythema or rash  Nails: There is no clubbing  Comments: Keratosis / dry blood noted left submet 2 area  Ulcer presents under the lesion  Wound bed is granular  No signs of infection  Neurological:      General: No focal deficit present  Mental Status: He is alert and oriented to person, place, and time  Cranial Nerves: No cranial nerve deficit  Sensory: Sensory deficit present  Coordination: Coordination normal    Psychiatric:         Mood and Affect: Mood normal          Behavior: Behavior normal          Thought Content:  Thought content normal          Judgment: Judgment normal

## 2023-03-22 ENCOUNTER — TELEPHONE (OUTPATIENT)
Dept: OBGYN CLINIC | Facility: OTHER | Age: 68
End: 2023-03-22

## 2023-03-22 NOTE — TELEPHONE ENCOUNTER
I called patient and left his a voicemail in regards to his surgery with Dr Rey Hilton which is scheduled for 4/4/23  Dr Surya Connolly and James Gomez, nurse practitioner state patient has a hgb A1c that is 8 4 and therefore a hardstop for surgery  They recommend he see his PCP regarding adjustments in his medication and a repeat of his hgb A1c in 4 months as well as hematology prior to surgery  I told patient to give me a call back, he has an appt with Dr Surya Connolly he confirmed already but most likely will not get cleared for surgery

## 2023-03-23 ENCOUNTER — ANESTHESIA EVENT (OUTPATIENT)
Dept: PERIOP | Facility: HOSPITAL | Age: 68
End: 2023-03-23

## 2023-03-23 ENCOUNTER — OFFICE VISIT (OUTPATIENT)
Dept: INTERNAL MEDICINE CLINIC | Facility: CLINIC | Age: 68
End: 2023-03-23

## 2023-03-23 VITALS
OXYGEN SATURATION: 99 % | WEIGHT: 257.2 LBS | BODY MASS INDEX: 36.82 KG/M2 | SYSTOLIC BLOOD PRESSURE: 130 MMHG | HEIGHT: 70 IN | HEART RATE: 80 BPM | DIASTOLIC BLOOD PRESSURE: 62 MMHG

## 2023-03-23 DIAGNOSIS — E11.621 DIABETIC ULCER OF LEFT FOOT ASSOCIATED WITH TYPE 2 DIABETES MELLITUS (HCC): ICD-10-CM

## 2023-03-23 DIAGNOSIS — I10 BENIGN ESSENTIAL HTN: ICD-10-CM

## 2023-03-23 DIAGNOSIS — D69.6 THROMBOCYTOPENIA (HCC): ICD-10-CM

## 2023-03-23 DIAGNOSIS — E66.01 OBESITY, MORBID (HCC): ICD-10-CM

## 2023-03-23 DIAGNOSIS — Z01.818 PRE-OPERATIVE CLEARANCE: ICD-10-CM

## 2023-03-23 DIAGNOSIS — E11.9 TYPE 2 DIABETES MELLITUS WITHOUT COMPLICATION, WITHOUT LONG-TERM CURRENT USE OF INSULIN (HCC): ICD-10-CM

## 2023-03-23 DIAGNOSIS — M19.012 PRIMARY OSTEOARTHRITIS OF LEFT SHOULDER: ICD-10-CM

## 2023-03-23 DIAGNOSIS — K70.30 ALCOHOLIC CIRRHOSIS OF LIVER WITHOUT ASCITES (HCC): ICD-10-CM

## 2023-03-23 DIAGNOSIS — L97.529 DIABETIC ULCER OF LEFT FOOT ASSOCIATED WITH TYPE 2 DIABETES MELLITUS (HCC): ICD-10-CM

## 2023-03-23 DIAGNOSIS — F10.10 ALCOHOL ABUSE: Primary | ICD-10-CM

## 2023-03-23 RX ORDER — GLIMEPIRIDE 4 MG/1
TABLET ORAL
COMMUNITY
Start: 2023-03-22

## 2023-03-23 RX ORDER — LOSARTAN POTASSIUM 100 MG/1
TABLET ORAL
COMMUNITY
Start: 2023-03-22

## 2023-03-23 RX ORDER — TRIAMTERENE AND HYDROCHLOROTHIAZIDE 37.5; 25 MG/1; MG/1
CAPSULE ORAL
COMMUNITY
Start: 2023-03-22

## 2023-03-23 RX ORDER — DILTIAZEM HYDROCHLORIDE 180 MG/1
CAPSULE, COATED, EXTENDED RELEASE ORAL
COMMUNITY
Start: 2023-03-22

## 2023-03-27 ENCOUNTER — OFFICE VISIT (OUTPATIENT)
Dept: GASTROENTEROLOGY | Facility: CLINIC | Age: 68
End: 2023-03-27

## 2023-03-27 VITALS
WEIGHT: 257 LBS | HEIGHT: 70 IN | BODY MASS INDEX: 36.79 KG/M2 | HEART RATE: 61 BPM | SYSTOLIC BLOOD PRESSURE: 137 MMHG | DIASTOLIC BLOOD PRESSURE: 62 MMHG

## 2023-03-27 DIAGNOSIS — K70.30 ALCOHOLIC CIRRHOSIS OF LIVER WITHOUT ASCITES (HCC): Primary | ICD-10-CM

## 2023-03-27 DIAGNOSIS — R16.2 HEPATOSPLENOMEGALY: ICD-10-CM

## 2023-03-27 DIAGNOSIS — D69.6 THROMBOCYTOPENIA (HCC): ICD-10-CM

## 2023-03-27 DIAGNOSIS — K80.20 CALCULUS OF GALLBLADDER WITHOUT CHOLECYSTITIS WITHOUT OBSTRUCTION: ICD-10-CM

## 2023-03-27 DIAGNOSIS — R79.89 ELEVATED LFTS: ICD-10-CM

## 2023-03-27 NOTE — ANESTHESIA PREPROCEDURE EVALUATION
Procedure:  ARTHROPLASTY SHOULDER REVERSE (Left: Shoulder)    Relevant Problems   CARDIO   (+) Benign essential HTN      ENDO   (+) Type 2 diabetes mellitus (HCC)      GI/HEPATIC   (+) Alcoholic cirrhosis (HCC)      /RENAL   (+) ABILIO (acute kidney injury) (HCC)      HEMATOLOGY   (+) Anemia   (+) Thrombocytopenia (HCC)      MUSCULOSKELETAL   (+) Primary osteoarthritis of left shoulder      PULMONARY   (+) MEG (obstructive sleep apnea)        Physical Exam    Airway    Mallampati score: II  TM Distance: >3 FB  Neck ROM: full     Dental       Cardiovascular      Pulmonary      Other Findings        Anesthesia Plan  ASA Score- 3     Anesthesia Type- general with ASA Monitors  Additional Monitors:   Airway Plan:     Comment: IS Block for post op pain per surgeon request      Hematology recommending - plt transfusion prior to procedure with goal Plt at least 100k, recommend hematology consult on admission, and plt on hold to OR in the event of intra-op bleeding and plt PRN post-op bleeding          Plan Factors-    Chart reviewed  Induction- intravenous  Postoperative Plan-     Informed Consent- Anesthetic plan and risks discussed with patient  I personally reviewed this patient with the CRNA  Discussed and agreed on the Anesthesia Plan with the CRNA  Kobi Moreno

## 2023-03-27 NOTE — PRE-PROCEDURE INSTRUCTIONS
Pre-Surgery Instructions:   Medication Instructions   • diltiazem (CARDIZEM CD) 180 mg 24 hr capsule Take day of surgery  • glimepiride (AMARYL) 4 mg tablet Hold day of surgery  • Insulin Glargine Solostar (Lantus SoloStar) 100 UNIT/ML SOPN Instructions provided by MD   • losartan (COZAAR) 100 MG tablet Instructions provided by MD   • Multiple Vitamin (multivitamin) capsule Stop taking 7 days prior to surgery  • traZODone (DESYREL) 50 mg tablet Take night before surgery   • triamterene-hydrochlorothiazide (DYAZIDE) 37 5-25 mg per capsule Instructions provided by MD   Medication instructions for day surgery reviewed  Please use only a sip of water to take your instructed medications  Avoid all over the counter vitamins, supplements and NSAIDS for one week prior to surgery per anesthesia guidelines  Tylenol is ok to take as needed  Per [facility](Dr Pily Pritchett)- pt instructed to take 1/2 dose of Lantus the night before surgery and hold the losartan/triamterene-HCTZ the day prior to surgery and the DOS  You will receive a call one business day prior to surgery with an arrival time and hospital directions  If your surgery is scheduled on a Monday, the hospital will be calling you on the Friday prior to your surgery  If you have not heard from anyone by 8pm, please call the hospital supervisor through the hospital  at [phone]  Flores Cheng [phone])  Do not eat or drink anything after midnight the night before your surgery, including candy, mints, lifesavers, or chewing gum  Do not drink alcohol 24hrs before your surgery  Try not to smoke at least 24hrs before your surgery  Follow the pre surgery showering instructions as listed in the [facility] Surgical Experience Booklet” or otherwise provided by your surgeon's office  Do not shave the surgical area 24 hours before surgery  Do not apply any lotions, creams, including makeup, cologne, deodorant, or perfumes after showering on the day of your surgery  No contact lenses, eye make-up, or artificial eyelashes  Remove nail polish, including gel polish, and any artificial, gel, or acrylic nails if possible  Remove all jewelry including rings and body piercing jewelry  Wear causal clothing that is easy to take on and off  Consider your type of surgery  Keep any valuables, jewelry, piercings at home  Please bring any specially ordered equipment (sling, braces) if indicated  Arrange for a responsible person to drive you to and from the hospital on the day of your surgery  Visitor Guidelines discussed  Call the surgeon's office with any new illnesses, exposures, or additional questions prior to surgery  Please reference your [facility] Surgical Experience Booklet” for additional information to prepare for your upcoming surgery

## 2023-03-27 NOTE — PROGRESS NOTES
[facility] - Outpatient Follow-up Note  Aimee Strong 69 y o  male MRN: [id number]  Encounter: [id number]          ASSESSMENT AND PLAN:      1  Alcoholic cirrhosis of liver without ascites (HCC)  -     AFP tumor marker; Future  -     CBC and differential; Future  -     Gamma GT; Future  -     Hepatic function panel; Future    2  Elevated LFTs    3  Thrombocytopenia ([address] )    4  BMI 37 0-37 9, adult    5  Calculus of gallbladder without cholecystitis without obstruction    6  Hepatosplenomegaly    Patient has evidence of cirrhosis, F4 fibrosis on elastography, splenomegaly, low platelets, will evaluate evaluation for any other etiology of liver disease with hepatitis BC iron panel LIAN is unrevealing  He has low platelets  Borderline AFP  Long discussion with patient that he must abstain from alcohol, advancement of liver disease feel complications and risk of liver cancer reviewed  Kennedy AFP in 3 months, need hepatitis B and a vaccines  Should have EGD colonoscopy done as well, but is going for left shoulder surgery, he will call us to schedule EGD colonoscopy if surgery is postponed  He does have increased risk of anesthesia and surgery with cirrhosis and thrombocytopenia, he is being evaluated by surgical optimization clinic            F4 S1  plt 53k  Sat 10  ggt 284  afp15    Abc neg bsab neg      ______________________________________________________________________    SUBJECTIVE:     Patient came in for follow-up of his liver disease, he has abnormal liver function test, extensive testing done, ultrasound elastography consistent with cirrhosis F4 S1, no specific etiology of liver disease except for alcohol  He stopped drinking since August   Denies any abdominal pain nausea vomiting GI bleeding dysphagia coughing choking spells melena hematochezia, denies any chest pain shortness of breath, 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  Diet medications more than 10 system reviewed    Does have left shoulder pain planning surgery  REVIEW OF SYSTEMS IS OTHERWISE NEGATIVE  Historical Information   Past Medical History:   Diagnosis Date   • Arrhythmia    • Diabetes mellitus ([facility] [address] )    • History of echocardiogram 2017    showed EF of 50-55 percentWith moderate LVH and left ventricle diastolic dysfunction  Left atrium was moderately enlarged  Trace MR noted  • History of Holter monitoring 2017    showed baseline rhythm of sinus origin with an average heart it of 61 bpm  The lowest heart rate was 49 and the highest heart rate was 10 8 bpm  There were rare single VPCs, and frequent PACs representing 3 2% of total beats  There were several episodes of sinus arrhythmias with sinus bradycardia and heart rate ranging from 40-90 bpm  No sustained dysrhythmias, or pauses noted   The patient did not   • Hypertension    • Obese      Past Surgical History:   Procedure Laterality Date   • EYE SURGERY Left    • HERNIA REPAIR  [id number]   • KNEE ARTHROPLASTY Right    • SHOULDER SURGERY Right      Social History   Social History     Substance and Sexual Activity   Alcohol Use Not Currently    Comment: quit      Social History     Substance and Sexual Activity   Drug Use Never     Social History     Tobacco Use   Smoking Status Former   • Packs/day: 0 50   • Years: 20 00   • Pack years: 10 00   • Types: Cigarettes   • Quit date:    • Years since quittin 2   Smokeless Tobacco Never     Family History   Problem Relation Age of Onset   • Diabetes Mother    • Supraventricular tachycardia Mother    • COPD Father    • Heart disease Father    • Other Father         Sepsis; related to UTI with complicating cardiac problems   • Heart disease Paternal Grandmother        Meds/Allergies       Current Outpatient Medications:   •  Insulin Glargine Solostar (Lantus SoloStar) 100 UNIT/ML SOPN  •  Insulin Pen Needle (BD Pen Needle Nayla 2nd Gen) 32G X 4 MM MISC  •  losartan (COZAAR) 100 MG tablet  • "Multiple Vitamin (multivitamin) capsule  •  traZODone (DESYREL) 50 mg tablet  •  triamterene-hydrochlorothiazide (DYAZIDE) 37 5-25 mg per capsule  •  diltiazem (CARDIZEM CD) 180 mg 24 hr capsule  •  glimepiride (AMARYL) 4 mg tablet    Allergies   Allergen Reactions   • Sulfa Antibiotics            Objective     Blood pressure 137/62, pulse 61, height 5' 10\" (1 778 m), weight 117 kg (257 lb)  Body mass index is 36 88 kg/m²  PHYSICAL EXAM:      General Appearance:   Alert, cooperative, no distress   HEENT:   Normocephalic, atraumatic, anicteric  Neck:  Supple, symmetrical, trachea midline   Lungs:   Clear to auscultation bilaterally; no rales, rhonchi or wheezing; respirations unlabored    Heart[de-identified]   Regular rate and rhythm; no murmur  Abdomen:   Soft, non-tender, non-distended; normal bowel sounds; no masses, no organomegaly    Genitalia:   Deferred    Rectal:   Deferred    Extremities:  No cyanosis, clubbing or edema    Skin:  No jaundice, rashes, or lesions    Lymph nodes:  No palpable cervical lymphadenopathy        Lab Results:   No visits with results within 1 Day(s) from this visit  Latest known visit with results is:   Lab Requisition on 03/20/2023   Component Date Value   • ABO Grouping 03/20/2023 A    • Rh Factor 03/20/2023 Positive    • Antibody Screen 03/20/2023 Negative    • Specimen Expiration Date 03/20/2023 [id number]          Radiology Results:   No results found    "

## 2023-03-28 ENCOUNTER — OFFICE VISIT (OUTPATIENT)
Dept: PODIATRY | Facility: CLINIC | Age: 68
End: 2023-03-28

## 2023-03-28 VITALS
HEART RATE: 73 BPM | HEIGHT: 70 IN | WEIGHT: 257 LBS | BODY MASS INDEX: 36.79 KG/M2 | SYSTOLIC BLOOD PRESSURE: 130 MMHG | DIASTOLIC BLOOD PRESSURE: 68 MMHG

## 2023-03-28 DIAGNOSIS — L97.521 ULCER OF LEFT FOOT, LIMITED TO BREAKDOWN OF SKIN (HCC): Primary | ICD-10-CM

## 2023-03-28 DIAGNOSIS — Z79.4 TYPE 2 DIABETES MELLITUS WITH DIABETIC NEUROPATHY, WITH LONG-TERM CURRENT USE OF INSULIN (HCC): ICD-10-CM

## 2023-03-28 DIAGNOSIS — E11.40 TYPE 2 DIABETES MELLITUS WITH DIABETIC NEUROPATHY, WITH LONG-TERM CURRENT USE OF INSULIN (HCC): ICD-10-CM

## 2023-03-28 NOTE — PROGRESS NOTES
Patient ID: Fe Wilson is a 69 y o  male Date of Birth 1955       Assessment:    No problem-specific Assessment & Plan notes found for this encounter  Diagnoses and all orders for this visit:    Ulcer of left foot, limited to breakdown of skin [facility] [place] )    Type 2 diabetes mellitus with diabetic neuropathy, with long-term current use of insulin ([facility] [place] [facility] )          Procedures      Plan:  1  Reviewed medical records  2  Removed keratosis  Wound is fully healed  He is cleared for shoulder surgery from podiatry stand point  Instructed protective dressing and pad to prevent recurring ulcer perioperatively  3   Recommended him to continue orthowedge shoe  Discussed proper off-loading and staying off of his feet  4   Recommended to obtain carbon fiber insole  5  RA in 3 weeks  Subjective:        RYAN Gunderson presents for evaluation of left foot ulcer  He feels well  No drainage  No pain  BS under control  No new complaint  The following portions of the patient's history were reviewed and updated as appropriate: allergies, current medications, past family history, past medical history, past social history, past surgical history and problem list       PAST MEDICAL HISTORY:  Past Medical History:   Diagnosis Date   • Arrhythmia    • CPAP (continuous positive airway pressure) dependence    • Diabetes mellitus ([facility] [place] [facility] )    • History of echocardiogram 11/14/2017    showed EF of 50-55 percentWith moderate LVH and left ventricle diastolic dysfunction  Left atrium was moderately enlarged  Trace MR noted  • History of Holter monitoring 11/21/2017    showed baseline rhythm of sinus origin with an average heart it of 61 bpm  The lowest heart rate was 49 and the highest heart rate was 10 8 bpm  There were rare single VPCs, and frequent PACs representing 3 2% of total beats   There were several episodes of sinus arrhythmias with sinus bradycardia and heart rate ranging from 40-90 bpm  No sustained dysrhythmias, or pauses noted  The patient did not   • Hypertension    • Liver disease    • Obese    • Sleep apnea        PAST SURGICAL HISTORY:  Past Surgical History:   Procedure Laterality Date   • CATARACT EXTRACTION     • EYE SURGERY Left    • HERNIA REPAIR  [id number]   • KNEE ARTHROPLASTY Right    • SHOULDER SURGERY Right         ALLERGIES:  Sulfa antibiotics    MEDICATIONS:  Current Outpatient Medications   Medication Sig Dispense Refill   • diltiazem (CARDIZEM CD) 180 mg 24 hr capsule      • glimepiride (AMARYL) 4 mg tablet      • Insulin Glargine Solostar (Lantus SoloStar) 100 UNIT/ML SOPN Inject 0 4 mL (40 Units total) under the skin daily at bedtime     • Insulin Pen Needle (BD Pen Needle Nayla 2nd Gen) 32G X 4 MM MISC For use with insulin pen  Pharmacy may dispense brand covered by insurance  100 each 0   • losartan (COZAAR) 100 MG tablet      • Multiple Vitamin (multivitamin) capsule Take 1 capsule by mouth daily 21 capsule 0   • traZODone (DESYREL) 50 mg tablet bedtime     • triamterene-hydrochlorothiazide (DYAZIDE) 37 5-25 mg per capsule        No current facility-administered medications for this visit         SOCIAL HISTORY:  Social History     Socioeconomic History   • Marital status: /Civil Union     Spouse name: None   • Number of children: 2   • Years of education: 16   • Highest education level: Some college, no degree   Occupational History   • None   Tobacco Use   • Smoking status: Former     Packs/day: 0 50     Years: 20 00     Pack years: 10 00     Types: Cigarettes     Quit date:      Years since quittin 2   • Smokeless tobacco: Never   Vaping Use   • Vaping Use: Never used   Substance and Sexual Activity   • Alcohol use: Not Currently     Comment: quit    • Drug use: Never   • Sexual activity: Not Currently   Other Topics Concern   • None   Social History Narrative    · Most recent tobacco use screenin2018      · Do you currently or have you "served in the [facility]:   No      · Live alone or with others:   with others      · High blood pressure: Yes      · Exercise level:   Occasional      · Overweight:   Yes      · Obese: Yes      · Diabetes:   Yes      Social Determinants of Health     Financial Resource Strain: Not on file   Food Insecurity: Not on file   Transportation Needs: Not on file   Physical Activity: Not on file   Stress: Not on file   Social Connections: Not on file   Intimate Partner Violence: Not on file   Housing Stability: Not on file      Review of Systems   Constitutional: Negative for chills and fever  Respiratory: Negative for shortness of breath  Cardiovascular: Negative for chest pain  Gastrointestinal: Negative for diarrhea, nausea and vomiting  Musculoskeletal: Negative for gait problem  Neurological: Positive for numbness  Objective:            /68 (BP Location: Left arm, Patient Position: Sitting, Cuff Size: Standard)   Pulse 73   Ht 5' 10\" (1 778 m)   Wt 117 kg (257 lb)   BMI 36 88 kg/m²     Physical Exam  Vitals reviewed  Constitutional:       General: He is not in acute distress  Appearance: Normal appearance  He is not ill-appearing or toxic-appearing  Cardiovascular:      Rate and Rhythm: Normal rate and regular rhythm  Pulses: Normal pulses  Pulmonary:      Effort: Pulmonary effort is normal  No respiratory distress  Musculoskeletal:         General: Deformity present  No tenderness or signs of injury  Skin:     General: Skin is warm  Coloration: Skin is not cyanotic or mottled  Findings: No abscess, erythema or rash  Nails: There is no clubbing  Comments: Wound fully closed with mild keratosis left submet 2  No signs of infection  Neurological:      General: No focal deficit present  Mental Status: He is alert and oriented to person, place, and time  Cranial Nerves: No cranial nerve deficit        Sensory: Sensory deficit " present  Coordination: Coordination normal    Psychiatric:         Mood and Affect: Mood normal          Behavior: Behavior normal          Thought Content:  Thought content normal          Judgment: Judgment normal

## 2023-04-04 ENCOUNTER — ANESTHESIA (OUTPATIENT)
Dept: PERIOP | Facility: HOSPITAL | Age: 68
End: 2023-04-04

## 2023-04-04 ENCOUNTER — APPOINTMENT (OUTPATIENT)
Dept: RADIOLOGY | Facility: HOSPITAL | Age: 68
End: 2023-04-04

## 2023-04-04 ENCOUNTER — HOSPITAL ENCOUNTER (OUTPATIENT)
Facility: HOSPITAL | Age: 68
Setting detail: OUTPATIENT SURGERY
Discharge: HOME | End: 2023-04-05
Attending: ORTHOPAEDIC SURGERY | Admitting: ORTHOPAEDIC SURGERY

## 2023-04-04 DIAGNOSIS — M19.112 POST-TRAUMATIC OSTEOARTHRITIS OF LEFT SHOULDER: ICD-10-CM

## 2023-04-04 DIAGNOSIS — M19.012 PRIMARY OSTEOARTHRITIS OF LEFT SHOULDER: ICD-10-CM

## 2023-04-04 DIAGNOSIS — D69.6 THROMBOCYTOPENIA (HCC): ICD-10-CM

## 2023-04-04 DIAGNOSIS — Z96.612 S/P REVERSE TOTAL SHOULDER ARTHROPLASTY, LEFT: Primary | ICD-10-CM

## 2023-04-04 LAB
ABO GROUP BLD: NORMAL
BLD GP AB SCN SERPL QL: NEGATIVE
ERYTHROCYTE [DISTWIDTH] IN BLOOD BY AUTOMATED COUNT: 12.9 % (ref 11.6–15.1)
GLUCOSE SERPL-MCNC: 141 MG/DL (ref 65–140)
GLUCOSE SERPL-MCNC: 235 MG/DL (ref 65–140)
GLUCOSE SERPL-MCNC: 336 MG/DL (ref 65–140)
HCT VFR BLD AUTO: 40.4 % (ref 36.5–49.3)
HGB BLD-MCNC: 13.1 G/DL (ref 12–17)
MCH RBC QN AUTO: 28.9 PG (ref 26.8–34.3)
MCHC RBC AUTO-ENTMCNC: 32.4 G/DL (ref 31.4–37.4)
MCV RBC AUTO: 89 FL (ref 82–98)
PLATELET # BLD AUTO: 59 THOUSANDS/UL (ref 149–390)
PMV BLD AUTO: 13.1 FL (ref 8.9–12.7)
RBC # BLD AUTO: 4.53 MILLION/UL (ref 3.88–5.62)
RH BLD: POSITIVE
SPECIMEN EXPIRATION DATE: NORMAL
WBC # BLD AUTO: 3.91 THOUSAND/UL (ref 4.31–10.16)

## 2023-04-04 DEVICE — IMPLANTABLE DEVICE
Type: IMPLANTABLE DEVICE | Site: SHOULDER | Status: FUNCTIONAL
Brand: TORNIER FLEX SHOULDER SYSTEM

## 2023-04-04 DEVICE — IMPLANTABLE DEVICE
Type: IMPLANTABLE DEVICE | Site: SHOULDER | Status: FUNCTIONAL
Brand: AEQUALIS™ ASCEND™ FLEX

## 2023-04-04 DEVICE — SCREW PERIPHERAL 5 X 22MM: Type: IMPLANTABLE DEVICE | Site: SHOULDER | Status: FUNCTIONAL

## 2023-04-04 DEVICE — SCREW PERIPHERAL 5 X 18MM: Type: IMPLANTABLE DEVICE | Site: SHOULDER | Status: FUNCTIONAL

## 2023-04-04 DEVICE — IMPLANTABLE DEVICE
Type: IMPLANTABLE DEVICE | Site: SHOULDER | Status: FUNCTIONAL
Brand: FLEX SHOULDER SYSTEM

## 2023-04-04 DEVICE — SCREW PERIPHERAL 5 X 34MM: Type: IMPLANTABLE DEVICE | Site: SHOULDER | Status: FUNCTIONAL

## 2023-04-04 DEVICE — SCREW CENTRAL 6 X 35MM: Type: IMPLANTABLE DEVICE | Site: SHOULDER | Status: FUNCTIONAL

## 2023-04-04 DEVICE — IMPLANTABLE DEVICE
Type: IMPLANTABLE DEVICE | Site: SHOULDER | Status: FUNCTIONAL
Brand: TORNIER PERFORM™ REVERSED

## 2023-04-04 DEVICE — IMPLANTABLE DEVICE
Type: IMPLANTABLE DEVICE | Site: SHOULDER | Status: FUNCTIONAL
Brand: TORNIER PERFORM® REVERSED AUGMENTED GLENOID

## 2023-04-04 RX ORDER — DEXAMETHASONE SODIUM PHOSPHATE 10 MG/ML
INJECTION, SOLUTION INTRAMUSCULAR; INTRAVENOUS AS NEEDED
Status: DISCONTINUED | OUTPATIENT
Start: 2023-04-04 | End: 2023-04-04

## 2023-04-04 RX ORDER — OXYCODONE HYDROCHLORIDE 5 MG/1
2.5 TABLET ORAL EVERY 4 HOURS PRN
Status: DISCONTINUED | OUTPATIENT
Start: 2023-04-04 | End: 2023-04-05 | Stop reason: HOSPADM

## 2023-04-04 RX ORDER — ACETAMINOPHEN 325 MG/1
650 TABLET ORAL EVERY 6 HOURS PRN
Status: DISCONTINUED | OUTPATIENT
Start: 2023-04-04 | End: 2023-04-05 | Stop reason: HOSPADM

## 2023-04-04 RX ORDER — ONDANSETRON 2 MG/ML
4 INJECTION INTRAMUSCULAR; INTRAVENOUS EVERY 6 HOURS PRN
Status: DISCONTINUED | OUTPATIENT
Start: 2023-04-04 | End: 2023-04-05 | Stop reason: HOSPADM

## 2023-04-04 RX ORDER — FENTANYL CITRATE 50 UG/ML
INJECTION, SOLUTION INTRAMUSCULAR; INTRAVENOUS AS NEEDED
Status: DISCONTINUED | OUTPATIENT
Start: 2023-04-04 | End: 2023-04-04

## 2023-04-04 RX ORDER — SODIUM CHLORIDE, SODIUM LACTATE, POTASSIUM CHLORIDE, CALCIUM CHLORIDE 600; 310; 30; 20 MG/100ML; MG/100ML; MG/100ML; MG/100ML
125 INJECTION, SOLUTION INTRAVENOUS CONTINUOUS
Status: DISCONTINUED | OUTPATIENT
Start: 2023-04-04 | End: 2023-04-05 | Stop reason: HOSPADM

## 2023-04-04 RX ORDER — TRAZODONE HYDROCHLORIDE 50 MG/1
50 TABLET ORAL
Status: DISCONTINUED | OUTPATIENT
Start: 2023-04-04 | End: 2023-04-05 | Stop reason: HOSPADM

## 2023-04-04 RX ORDER — ROCURONIUM BROMIDE 10 MG/ML
INJECTION, SOLUTION INTRAVENOUS AS NEEDED
Status: DISCONTINUED | OUTPATIENT
Start: 2023-04-04 | End: 2023-04-04

## 2023-04-04 RX ORDER — OXYCODONE HYDROCHLORIDE 5 MG/1
TABLET ORAL
Qty: 13 TABLET | Refills: 0 | Status: SHIPPED | OUTPATIENT
Start: 2023-04-04

## 2023-04-04 RX ORDER — TRIAMTERENE AND HYDROCHLOROTHIAZIDE 37.5; 25 MG/1; MG/1
1 TABLET ORAL DAILY
Status: DISCONTINUED | OUTPATIENT
Start: 2023-04-05 | End: 2023-04-04

## 2023-04-04 RX ORDER — CALCIUM CARBONATE 200(500)MG
1000 TABLET,CHEWABLE ORAL DAILY PRN
Status: DISCONTINUED | OUTPATIENT
Start: 2023-04-04 | End: 2023-04-05 | Stop reason: HOSPADM

## 2023-04-04 RX ORDER — MAGNESIUM HYDROXIDE 1200 MG/15ML
LIQUID ORAL AS NEEDED
Status: DISCONTINUED | OUTPATIENT
Start: 2023-04-04 | End: 2023-04-04 | Stop reason: HOSPADM

## 2023-04-04 RX ORDER — CEFAZOLIN SODIUM 2 G/50ML
2000 SOLUTION INTRAVENOUS EVERY 8 HOURS
Status: COMPLETED | OUTPATIENT
Start: 2023-04-04 | End: 2023-04-05

## 2023-04-04 RX ORDER — ONDANSETRON 2 MG/ML
4 INJECTION INTRAMUSCULAR; INTRAVENOUS ONCE AS NEEDED
Status: DISCONTINUED | OUTPATIENT
Start: 2023-04-04 | End: 2023-04-04 | Stop reason: HOSPADM

## 2023-04-04 RX ORDER — HYDRALAZINE HYDROCHLORIDE 25 MG/1
25 TABLET, FILM COATED ORAL EVERY 8 HOURS PRN
Status: DISCONTINUED | OUTPATIENT
Start: 2023-04-04 | End: 2023-04-05 | Stop reason: HOSPADM

## 2023-04-04 RX ORDER — DILTIAZEM HYDROCHLORIDE 180 MG/1
180 CAPSULE, COATED, EXTENDED RELEASE ORAL DAILY
Status: DISCONTINUED | OUTPATIENT
Start: 2023-04-05 | End: 2023-04-05 | Stop reason: HOSPADM

## 2023-04-04 RX ORDER — OXYCODONE HYDROCHLORIDE 5 MG/1
5 TABLET ORAL EVERY 4 HOURS PRN
Status: DISCONTINUED | OUTPATIENT
Start: 2023-04-04 | End: 2023-04-05 | Stop reason: HOSPADM

## 2023-04-04 RX ORDER — CEFAZOLIN SODIUM 2 G/50ML
2000 SOLUTION INTRAVENOUS ONCE
Status: COMPLETED | OUTPATIENT
Start: 2023-04-04 | End: 2023-04-04

## 2023-04-04 RX ORDER — GLIMEPIRIDE 2 MG/1
4 TABLET ORAL
Status: DISCONTINUED | OUTPATIENT
Start: 2023-04-05 | End: 2023-04-05 | Stop reason: HOSPADM

## 2023-04-04 RX ORDER — HYDROMORPHONE HCL/PF 1 MG/ML
0.5 SYRINGE (ML) INJECTION EVERY 2 HOUR PRN
Status: DISCONTINUED | OUTPATIENT
Start: 2023-04-04 | End: 2023-04-05

## 2023-04-04 RX ORDER — CHLORHEXIDINE GLUCONATE 0.12 MG/ML
15 RINSE ORAL ONCE
Status: COMPLETED | OUTPATIENT
Start: 2023-04-04 | End: 2023-04-04

## 2023-04-04 RX ORDER — METOCLOPRAMIDE HYDROCHLORIDE 5 MG/ML
10 INJECTION INTRAMUSCULAR; INTRAVENOUS ONCE AS NEEDED
Status: DISCONTINUED | OUTPATIENT
Start: 2023-04-04 | End: 2023-04-04 | Stop reason: HOSPADM

## 2023-04-04 RX ORDER — ONDANSETRON 2 MG/ML
INJECTION INTRAMUSCULAR; INTRAVENOUS AS NEEDED
Status: DISCONTINUED | OUTPATIENT
Start: 2023-04-04 | End: 2023-04-04

## 2023-04-04 RX ORDER — INSULIN LISPRO 100 [IU]/ML
2-12 INJECTION, SOLUTION INTRAVENOUS; SUBCUTANEOUS
Status: DISCONTINUED | OUTPATIENT
Start: 2023-04-04 | End: 2023-04-05 | Stop reason: HOSPADM

## 2023-04-04 RX ORDER — DOCUSATE SODIUM 100 MG/1
100 CAPSULE, LIQUID FILLED ORAL 2 TIMES DAILY
Status: DISCONTINUED | OUTPATIENT
Start: 2023-04-04 | End: 2023-04-05 | Stop reason: HOSPADM

## 2023-04-04 RX ORDER — LIDOCAINE HYDROCHLORIDE 10 MG/ML
INJECTION, SOLUTION EPIDURAL; INFILTRATION; INTRACAUDAL; PERINEURAL AS NEEDED
Status: DISCONTINUED | OUTPATIENT
Start: 2023-04-04 | End: 2023-04-04

## 2023-04-04 RX ORDER — SODIUM CHLORIDE, SODIUM LACTATE, POTASSIUM CHLORIDE, CALCIUM CHLORIDE 600; 310; 30; 20 MG/100ML; MG/100ML; MG/100ML; MG/100ML
100 INJECTION, SOLUTION INTRAVENOUS CONTINUOUS
Status: DISCONTINUED | OUTPATIENT
Start: 2023-04-04 | End: 2023-04-05 | Stop reason: HOSPADM

## 2023-04-04 RX ORDER — LOSARTAN POTASSIUM 50 MG/1
100 TABLET ORAL DAILY
Status: DISCONTINUED | OUTPATIENT
Start: 2023-04-05 | End: 2023-04-04

## 2023-04-04 RX ORDER — PROPOFOL 10 MG/ML
INJECTION, EMULSION INTRAVENOUS AS NEEDED
Status: DISCONTINUED | OUTPATIENT
Start: 2023-04-04 | End: 2023-04-04

## 2023-04-04 RX ORDER — BUPIVACAINE HYDROCHLORIDE 5 MG/ML
INJECTION, SOLUTION EPIDURAL; INTRACAUDAL AS NEEDED
Status: DISCONTINUED | OUTPATIENT
Start: 2023-04-04 | End: 2023-04-04

## 2023-04-04 RX ORDER — LIDOCAINE HYDROCHLORIDE 10 MG/ML
0.5 INJECTION, SOLUTION EPIDURAL; INFILTRATION; INTRACAUDAL; PERINEURAL ONCE AS NEEDED
Status: DISCONTINUED | OUTPATIENT
Start: 2023-04-04 | End: 2023-04-04 | Stop reason: HOSPADM

## 2023-04-04 RX ORDER — HYDROMORPHONE HCL/PF 1 MG/ML
0.5 SYRINGE (ML) INJECTION
Status: DISCONTINUED | OUTPATIENT
Start: 2023-04-04 | End: 2023-04-04 | Stop reason: HOSPADM

## 2023-04-04 RX ORDER — MIDAZOLAM HYDROCHLORIDE 2 MG/2ML
INJECTION, SOLUTION INTRAMUSCULAR; INTRAVENOUS AS NEEDED
Status: DISCONTINUED | OUTPATIENT
Start: 2023-04-04 | End: 2023-04-04

## 2023-04-04 RX ADMIN — FENTANYL CITRATE 100 MCG: 50 INJECTION INTRAMUSCULAR; INTRAVENOUS at 10:46

## 2023-04-04 RX ADMIN — DEXAMETHASONE SODIUM PHOSPHATE 10 MG: 10 INJECTION INTRAMUSCULAR; INTRAVENOUS at 11:13

## 2023-04-04 RX ADMIN — INSULIN LISPRO 4 UNITS: 100 INJECTION, SOLUTION INTRAVENOUS; SUBCUTANEOUS at 16:28

## 2023-04-04 RX ADMIN — CHLORHEXIDINE GLUCONATE 0.12% ORAL RINSE 15 ML: 1.2 LIQUID ORAL at 09:33

## 2023-04-04 RX ADMIN — SUGAMMADEX 200 MG: 100 INJECTION, SOLUTION INTRAVENOUS at 12:37

## 2023-04-04 RX ADMIN — PHENYLEPHRINE HYDROCHLORIDE 50 MCG/MIN: 10 INJECTION INTRAVENOUS at 11:23

## 2023-04-04 RX ADMIN — BUPIVACAINE HYDROCHLORIDE 7.5 ML: 5 INJECTION, SOLUTION EPIDURAL; INTRACAUDAL; PERINEURAL at 10:20

## 2023-04-04 RX ADMIN — LIDOCAINE HYDROCHLORIDE 50 MG: 10 INJECTION, SOLUTION EPIDURAL; INFILTRATION; INTRACAUDAL; PERINEURAL at 11:13

## 2023-04-04 RX ADMIN — ONDANSETRON 4 MG: 2 INJECTION INTRAMUSCULAR; INTRAVENOUS at 11:13

## 2023-04-04 RX ADMIN — MIDAZOLAM 2 MG: 1 INJECTION INTRAMUSCULAR; INTRAVENOUS at 10:46

## 2023-04-04 RX ADMIN — CEFAZOLIN SODIUM 2000 MG: 2 SOLUTION INTRAVENOUS at 11:14

## 2023-04-04 RX ADMIN — B-COMPLEX W/ C & FOLIC ACID TAB 1 TABLET: TAB at 16:28

## 2023-04-04 RX ADMIN — BUPIVACAINE 20 ML: 13.3 INJECTION, SUSPENSION, LIPOSOMAL INFILTRATION at 10:20

## 2023-04-04 RX ADMIN — SODIUM CHLORIDE, SODIUM LACTATE, POTASSIUM CHLORIDE, AND CALCIUM CHLORIDE 100 ML/HR: .6; .31; .03; .02 INJECTION, SOLUTION INTRAVENOUS at 21:21

## 2023-04-04 RX ADMIN — CEFAZOLIN SODIUM 2000 MG: 2 SOLUTION INTRAVENOUS at 18:09

## 2023-04-04 RX ADMIN — ROCURONIUM BROMIDE 50 MG: 10 INJECTION INTRAVENOUS at 11:13

## 2023-04-04 RX ADMIN — TRAZODONE HYDROCHLORIDE 50 MG: 50 TABLET ORAL at 22:34

## 2023-04-04 RX ADMIN — SODIUM CHLORIDE, SODIUM LACTATE, POTASSIUM CHLORIDE, AND CALCIUM CHLORIDE: .6; .31; .03; .02 INJECTION, SOLUTION INTRAVENOUS at 10:45

## 2023-04-04 RX ADMIN — PROPOFOL 200 MG: 10 INJECTION, EMULSION INTRAVENOUS at 11:13

## 2023-04-04 RX ADMIN — Medication 5 MG: at 11:54

## 2023-04-04 NOTE — ANESTHESIA PROCEDURE NOTES
Peripheral Block    Patient location during procedure: holding area  Start time: 4/4/2023 10:20 AM  Reason for block: at surgeon's request and post-op pain management  Staffing  Performed: Anesthesiologist   Anesthesiologist: Michael Shah MD  Preanesthetic Checklist  Completed: patient identified, IV checked, site marked, risks and benefits discussed, surgical consent, monitors and equipment checked, pre-op evaluation and timeout performed  Peripheral Block  Patient position: supine  Prep: ChloraPrep  Patient monitoring: continuous pulse ox and frequent blood pressure checks  Block type: interscalene  Laterality: left  Injection technique: single-shot  Procedures: ultrasound guided, Ultrasound guidance required for the procedure to increase accuracy and safety of medication placement and decrease risk of complications    Ultrasound permanent image saved  Needle  Needle type: Stimuplex   Needle gauge: 22 G  Needle length: 5 cm  Needle localization: ultrasound guidance  Test dose: negative  Assessment  Injection assessment: incremental injection, local visualized surrounding nerve on ultrasound, negative aspiration for heme and no paresthesia on injection  Paresthesia pain: none  Heart rate change: no  Slow fractionated injection: yes  Post-procedure:  site cleaned  patient tolerated the procedure well with no immediate complications

## 2023-04-04 NOTE — ANESTHESIA POSTPROCEDURE EVALUATION
Post-Op Assessment Note    CV Status:  Stable  Pain Score: 0    Pain management: adequate     Mental Status:  Alert and awake   Hydration Status:  Euvolemic   PONV Controlled:  Controlled   Airway Patency:  Patent      Post Op Vitals Reviewed: Yes      Staff: Anesthesiologist, CRNA         No notable events documented      BP   128/45   Temp  97 6   Pulse  73   Resp   20   SpO2   95

## 2023-04-04 NOTE — PLAN OF CARE
Problem: PAIN - ADULT  Goal: Verbalizes/displays adequate comfort level or baseline comfort level  Description: Interventions:  - Encourage patient to monitor pain and request assistance  - Assess pain using appropriate pain scale  - Administer analgesics based on type and severity of pain and evaluate response  - Implement non-pharmacological measures as appropriate and evaluate response  - Consider cultural and social influences on pain and pain management  - Notify physician/advanced practitioner if interventions unsuccessful or patient reports new pain  Outcome: Progressing     Problem: INFECTION - ADULT  Goal: Absence or prevention of progression during hospitalization  Description: INTERVENTIONS:  - Assess and monitor for signs and symptoms of infection  - Monitor lab/diagnostic results  - Monitor all insertion sites, i e  indwelling lines, tubes, and drains  - Monitor endotracheal if appropriate and nasal secretions for changes in amount and color  - [place] appropriate cooling/warming therapies per order  - Administer medications as ordered  - Instruct and encourage patient and family to use good hand hygiene technique  - Identify and instruct in appropriate isolation precautions for identified infection/condition  Outcome: Progressing     Problem: DISCHARGE PLANNING  Goal: Discharge to home or other facility with appropriate resources  Description: INTERVENTIONS:  - Identify barriers to discharge w/patient and caregiver  - Arrange for needed discharge resources and transportation as appropriate  - Identify discharge learning needs (meds, wound care, etc )  - Arrange for interpretive services to assist at discharge as needed  - Refer to Case Management Department for coordinating discharge planning if the patient needs post-hospital services based on physician/advanced practitioner order or complex needs related to functional status, cognitive ability, or social support system  Outcome: Progressing Problem: Knowledge Deficit  Goal: Patient/family/caregiver demonstrates understanding of disease process, treatment plan, medications, and discharge instructions  Description: Complete learning assessment and assess knowledge base    Interventions:  - Provide teaching at level of understanding  - Provide teaching via preferred learning methods  Outcome: Progressing

## 2023-04-04 NOTE — PROGRESS NOTES
Progress Note - Orthopedics   Nellie Shaun 69 y o  male MRN: [id number]  Unit/Bed#: -01      Subjective:    67 y o male POD 0 L rTSA No acute events, no new complaints  Patient doing well  Pain well controlled  Denies fevers, chills, CP, SOB, N/V  Patient reports no issues with urination or bowel movements  Patient states he is doing well      Labs:  0   Lab Value Date/Time    HCT 40 4 04/04/2023 0853    HCT 39 5 03/20/2023 0809    HCT 45 9 09/13/2022 2034    HGB 13 1 04/04/2023 0853    HGB 12 4 03/20/2023 0809    HGB 15 1 09/13/2022 2034    INR 1 25 (H) 01/03/2021 0709    WBC 3 91 (L) 04/04/2023 0853    WBC 3 41 (L) 03/20/2023 0809    WBC 4 02 (L) 09/13/2022 2034    ESR 21 (H) 09/13/2022 2202    CRP 12 6 (H) 09/15/2022 0424       Meds:    Current Facility-Administered Medications:   •  acetaminophen (TYLENOL) tablet 650 mg, 650 mg, Oral, Q6H PRN, Ron Roberson PA-C  •  calcium carbonate (TUMS) chewable tablet 1,000 mg, 1,000 mg, Oral, Daily PRN, Ron Roberson PA-C  •  ceFAZolin (ANCEF) IVPB (premix in dextrose) 2,000 mg 50 mL, 2,000 mg, Intravenous, Q8H, Ron Roberson PA-C  •  [START ON 4/5/2023] diltiazem (CARDIZEM CD) 24 hr capsule 180 mg, 180 mg, Oral, Daily, Ron Roberson PA-C  •  docusate sodium (COLACE) capsule 100 mg, 100 mg, Oral, BID, Ron Roberson PA-C  •  [START ON 4/5/2023] glimepiride (AMARYL) tablet 4 mg, 4 mg, Oral, Daily With Breakfast, Ron Roberson PA-C  •  hydrALAZINE (APRESOLINE) tablet 25 mg, 25 mg, Oral, Q8H PRN, Krissy Schooner, CRNP  •  HYDROmorphone (DILAUDID) injection 0 5 mg, 0 5 mg, Intravenous, Q2H PRN, Ron Roberson PA-C  •  insulin lispro (HumaLOG) 100 units/mL subcutaneous injection 2-12 Units, 2-12 Units, Subcutaneous, TID AC, 4 Units at 04/04/23 1628 **AND** Fingerstick Glucose (POCT), , , TID AC, Ron Roberson PA-C  •  lactated ringers bolus 1,000 mL, 1,000 mL, Intravenous, Once PRN **AND** lactated ringers bolus 1,000 mL, 1,000 mL, Intravenous, Once PRN, Derryl Cesar, PA-C  •  lactated ringers infusion, 125 mL/hr, Intravenous, Continuous, Te Bland MD, New Bag at 04/04/23 1045  •  lactated ringers infusion, 100 mL/hr, Intravenous, Continuous, Derryl Cesar, PA-C, Last Rate: 100 mL/hr at 04/04/23 1249, 100 mL/hr at 04/04/23 1249  •  multivitamin stress formula tablet 1 tablet, 1 tablet, Oral, Daily, Derryl Cesar, PA-C, 1 tablet at 04/04/23 1628  •  [facility] injection 4 mg, 4 mg, Intravenous, Q6H PRN, Derryl Cesar, PA-C  •  oxyCODONE (ROXICODONE) IR tablet 2 5 mg, 2 5 mg, Oral, Q4H PRN, Derryl Cesar, PA-C  •  oxyCODONE (ROXICODONE) IR tablet 5 mg, 5 mg, Oral, Q4H PRN, Derryl Cesar, PA-C  •  traZODone (DESYREL) tablet 50 mg, 50 mg, Oral, HS, Derryl Cesar, PA-C    Blood Culture:   Lab Results   Component Value Date    BLOODCX No Growth After 5 Days  09/13/2022    BLOODCX No Growth After 5 Days  09/13/2022       Wound Culture:   Lab Results   Component Value Date    WOUNDCULT 1+ Growth of Klebsiella oxytoca (A) 09/14/2022    WOUNDCULT 1+ Growth of 09/14/2022       Ins and Outs:  I/O last 24 hours: In: 1231 7 [I V :935; Blood:296 7]  Out: 150 [Blood:150]          Physical:  Vitals:    04/04/23 1634   BP: 131/76   Pulse: 86   Resp: 17   Temp:    SpO2: 95%     Musculoskeletal: left Upper Extremity  · Skin warm, dry   No erythema or ecchymosis  · Dressing c/d/i  · No TTP due to block  · Nerve block still working, able to wiggle fingers, but no sensation   · 2+ radial pulse, symmetric bilaterally  · Digits warm and well perfused  · Capillary refill < 2 seconds    Assessment:    67 y o male POD 0 L rTSA  Patient doing well       Plan:  · NWB LUE  · Will monitor for ABLA and administer IVF/prbc as indicated for Greater than 2 gram drop or Hgb < 7  · PT/OT  · Pain control  · DVT ppx none  · Dispo: Ortho will follow    Lexi Tran MD

## 2023-04-04 NOTE — OP NOTE
OPERATIVE REPORT  PATIENT NAME: Jenni Ward    :  1955  MRN: [id number]  Pt Location:  OR ROOM 15    SURGERY DATE: 2023     SURGEON: Maribeth Yanez MD     ASSISTANT: Fredricka Ahumada PA-C     NOTE: Fredricka Ahumada PA-C was present throughout the entire procedure and performed essential assistance with patient prepping, draping, positioning, trial implantation/range of motion, final implant insertion, careful retraction, wound closure, sterile dressing application and sling application, all under my direct supervision  NOTE: No qualified resident physician was available for assistance    SECOND ASSITANT: Alida Signs ATC    PREOPERATIVE DIAGNOSIS: Left Shoulder Rotator Cuff Tear Arthropathy    POSTOPERATIVE DIAGNOSIS: Same    PROCEDURES: Left Reverse Total Shoulder Arthroplasty with Long Head Biceps Tenodesis    ANESTHESIA STAFF: Anum Hi MD     ANESTHESIA TYPE: General with endotracheal tube with ultrasound guided interscalene block (Exparel)  The interscalene block was provided by the anesthesia staff per my request for postoperative pain control and to decrease the use of postoperative narcotic medication for pain control  COMPLICATIONS: None    FINDINGS: Rotator Cuff Tear Arthropathy    SPECIMEN(S): None    ESTIMATED BLOOD LOSS: 150 mL    INDICATIONS FOR PROCEDURE:  The patient is a 69 y o  male presenting with pain and lack of function secondary to rotator cuff tear arthropathy of the left shoulder  After a thorough discussion of the risks and benefits of operative and nonoperative care the patient elected for left reverse total shoulder arthroplasty  Informed consent was obtained in the office  Patient was noted to have multiple medical comorbidities and was referred from [address] [place] [address] had the surgery performed here at [place] [address] given his coagulopathy and thrombocytopenia    Patient understood he had increased risk undergoing elective shoulder surgery secondary to the thrombocytopenia and his diabetes mellitus and after optimizing the patient is much as possible medically he did wish to proceed forward with scheduling  OPERATIVE TECHNIQUE:  The day of surgery I identified the patient's left shoulder and marked it with my initials  The patient was taken back to the operating room where anesthesia was induced by the anesthesia staff without complication  The patient was placed in the beachchair position with all bony prominences padded  The left shoulder was prepped and draped in routine sterile fashion and after a time-out for safety and confirming 2 grams of IV Cefazolin were given, a standard deltopectoral approach was performed  The dissection was carried down to the subscapularis which was intact  It was released and tagged for repair to the anterior humerus later in the case    The long head of biceps had severe tenosynovitis and degeneration, so it was released and tagged for later tenodesis to the anterior humerus at the end of the case  The humeral head was exposed and found to have severe degenerative change with a thinned and atrophic suprapsinatus tendon with a robust and intact infraspinatus  These were protected throughout the case  The humerus was prepared keeping with the surgical technique for a Tornier Flex reverse prosthesis  After preparing the humerus the glenoid was exposed and prepared for a 29 mm Tornier Perform Plus full wedge augmented baseplate  The full wedge augment was required to correct the 23 degrees of retroversion and 15 degrees of superior inclination appreciated on preoperative templating  The baseplate was placed and then secured with a central 6 5 mm diameter 35 mm length screw  Additional fixation was achieved with a 18 mm non-locking screw followed by a superior 34 mm and an inferior 22 mm locking screws  These achieved excellent fixation of the baseplate to the glenoid    A 39 mm + 3 mm lateralized glenosphere was then fixated to the baseplate  The humerus was finished and a size 2A Tornier Flex Stem with a high offset tray and a +6 mm C angle polyethylene (135 degree construct) was trialed and found to have excellent stability with full range of motion and appropriate soft tissue tension  Final implants were placed and the area was irrigated with pulse lavage  The subscapularis was repaired to the anterior humerus and the long head biceps was tenodesed to the anterior humerus with Orthocord sutures  The deltopectoral interval was loosely closed with 0 Vicryl and the subdermal layer with 2-0 Vicryl with staples for skin  Sterile dressings and a sling with abduction pillow was placed and the patient was awoken  The patient was transported to the recovery room in good condition and will be admitted for post-operative care and physical therapy will be initiated following the standard reverse total shoulder arthroplasty protocol      PATIENT DISPOSITION:  Stable to PACU      SIGNATURE: Apoorva Kulkarni MD  DATE: April 4, 2023  TIME: 12:44 PM

## 2023-04-04 NOTE — CONSULTS
Internal Medicine  Consultation Note    Patient: Anoop Arellano  Age/sex: 69 y o  male  Medical Record #: [id number]    Assessment/Plan    Status Post left reverse Total Shoulder Arthroplasty  • Continue post op pain control measures as prescribed  • Follow bowel regimen to help decrease narcotic induced constipation  •  Follow post operative hemoglobin with serial CBC and treat accordingly  • Monitor WBC and fever curve post op while encouraging use of incentive spirometer  • DVT prophylaxis in place and reviewed  DM II  • Hgb A1c 8 2  • Home meds: Lantus 40 units qhs/glimepiride daily  • Switch to DM diet  • Add sliding scale and coverage  • Resume medications as above  • Monitor trend    Thrombocytopenia  · Baseline platelets in the 05-91 range  · Monitor for bleeding    HTN  • Hold maxide/cozaar in the post operative setting to avoid hypotension/ABILIO  • OK to resume on dc  • Resume cardizem with appropriate hold parameters  • Add hydralazine as needed for SBP >160  • Monitor trend    Etoh cirrhosis  · Continue to abstain from etoh  · F/u with GI as scheduled    Chronic LLE wound  · Healed cleared by podiatry  · F/b podiatry recommend continued offloading and pads to be placed to prevent further opening  ·           PRE-OP HGB LEVEL: 13 1    Subjective/ HPI: Anoop Arellano was seen and examined  Hx of shoulder pain failed out patient conservative measures  Elected to undergo total shoulder arthroplasty We are asked to see patient for post op management of underlying medical co-morbidities as outlined above  Pt did well intra and post operatively with good hemodynamics  Pt currently comfortable and without any reported post op nausea  ROS:   A 10 point ROS was performed; negative except as noted above       Social History:    Substance Use History:   Social History     Substance and Sexual Activity   Alcohol Use Not Currently    Comment: quit 12/24     Social History     Tobacco Use   Smoking Status Former   • Packs/day: 0 50   • Years: 20 00   • Pack years: 10 00   • Types: Cigarettes   • Quit date: 46   • Years since quittin 2   Smokeless Tobacco Never     Social History     Substance and Sexual Activity   Drug Use Never       Family History:    n/a      Review of Scheduled Meds:  Current Facility-Administered Medications   Medication Dose Route Frequency Provider Last Rate   • acetaminophen  650 mg Oral Q6H PRN Melanee Saira, PA-C     • calcium carbonate  1,000 mg Oral Daily PRN Melanee Saira, PA-C     • cefazolin  2,000 mg Intravenous Q8H Melanee Saira, PA-C     • diltiazem  180 mg Oral Daily Melanee Saira, PA-C     • docusate sodium  100 mg Oral BID Melanee Saira, PA-C     • [START ON 2023] glimepiride  4 mg Oral Daily With Breakfast Melanee Saira, PA-C     • hydrALAZINE  25 mg Oral Q8H PRN PaulAMOR Ac     • HYDROmorphone  0 5 mg Intravenous Q2H PRN Melanee Saira, PA-C     • insulin lispro  2-12 Units Subcutaneous TID AC Melanee Saira, PA-C     • lactated ringers  1,000 mL Intravenous Once PRN Melanee Saira, PA-C      And   • lactated ringers  1,000 mL Intravenous Once PRN Melanee Saira, PA-C     • lactated ringers  125 mL/hr Intravenous Continuous Fercho Miller MD     • lactated ringers  100 mL/hr Intravenous Continuous Melanee Saira, PA-C 100 mL/hr (23 1249)   • multivitamin stress formula  1 tablet Oral Daily Melanee Saira, PA-C     • ondansetron  4 mg Intravenous Q6H PRN Melanee Saira, PA-C     • oxyCODONE  2 5 mg Oral Q4H PRN Melanee Saira, PA-C     • oxyCODONE  5 mg Oral Q4H PRN Melanee Saira, PA-C     • traZODone  50 mg Oral HS Melanee Saira, PA-C         Labs:     Results from last 7 days   Lab Units 23  0853   WBC Thousand/uL 3 91*   HEMOGLOBIN g/dL 13 1   HEMATOCRIT % 40 4   PLATELETS Thousands/uL 59*           Invalid input(s): LABGLOM, CMP Results from last 7 days   Lab Units 23  1256   POC GLUCOSE mg/dl 141*       Lab Results   Component Value Date    BLOODCX No Growth After 5 Days  2022    BLOODCX No Growth After 5 Days  2022    BLOODCX No Growth After 5 Days  2022    BLOODCX No Growth After 5 Days  2022    WOUNDCULT 1+ Growth of Klebsiella oxytoca (A) 2022    WOUNDCULT 1+ Growth of 2022       Input and Output Summary (last 24 hours): Intake/Output Summary (Last 24 hours) at 2023 1317  Last data filed at 2023 1310  Gross per 24 hour   Intake 1231 67 ml   Output 150 ml   Net 1081 67 ml       Imaging:     XR shoulder left 1 view    (Results Pending)       *Labs /Radiology studiesLabs reviewed  *Medications reviewed and reconciled as needed  *Please refer to order section for additional ordered labs studies  *Case discussed with primary attending during morning huddle case rounds    Vitals:   Temp (24hrs), Av 3 °F (36 3 °C), Min:96 7 °F (35 9 °C), Max:97 7 °F (36 5 °C)    Temp:  [96 7 °F (35 9 °C)-97 7 °F (36 5 °C)] 97 6 °F (36 4 °C)  HR:  [58-77] 77  Resp:  [15-18] 16  BP: (124-140)/(45-71) 128/45  SpO2:  [95 %-100 %] 95 %  Body mass index is 35 87 kg/m²       Physical Exam:   General Appearance: no distress, conversive  HEENT: PERRLA, conjuctiva normal; oropharynx clear; mucous membranes moist;   Neck:  Supple, no lymphadenopathy or thyromegaly  Lungs: CTA, normal respiratory effort, no retractions, expiratory effort normal  CV: regular rate and rhythm , PMI normal   ABD: soft non tender, no masses , obese +BS x4  EXT: DP pulses intact, no lymphadenopathy, no edema ;  left shoulder dressing in place  Skin: normal turgor, normal texture, no rash  Psych: affect normal, mood normal  Neuro: AAOx3          Invasive Devices     Peripheral Intravenous Line  Duration           Peripheral IV 23 Distal;Right;Upper;Ventral (anterior) Arm <1 day                   Code Status: Level 1 - Full Code  Current Length of Stay: 0 day(s)    Total floor / unit time spent today 1 hour  Coordination of patient's care was performed in conjunction with primary service  Time invested included review of patient's labs, vitals, and management of their comorbidities with continued monitoring, examination of patient as well as answering patient questions, documenting her findings and creating progress note in electronic medical record,  ordering appropriate diagnostic testing  Medical decision making for the day was made by supervising physician unless otherwise noted in their attestation statement  ** Please Note: Fluency Direct voice to text software may have been used in the creation of this document   Audio transcription errors may occur**

## 2023-04-04 NOTE — INTERVAL H&P NOTE
H&P reviewed  After examining the patient I find no changes in the patients condition since the H&P had been written      Vitals:    04/04/23 0912   BP: 124/61   Pulse: 58   Resp: 18   Temp: (!) 97 1 °F (36 2 °C)   SpO2: 97%

## 2023-04-05 VITALS
RESPIRATION RATE: 17 BRPM | SYSTOLIC BLOOD PRESSURE: 115 MMHG | HEART RATE: 87 BPM | OXYGEN SATURATION: 96 % | HEIGHT: 70 IN | DIASTOLIC BLOOD PRESSURE: 66 MMHG | WEIGHT: 250 LBS | BODY MASS INDEX: 35.79 KG/M2 | TEMPERATURE: 97.4 F

## 2023-04-05 LAB
ABO GROUP BLD BPU: NORMAL
ANION GAP SERPL CALCULATED.3IONS-SCNC: 5 MMOL/L (ref 4–13)
BPU ID: NORMAL
BUN SERPL-MCNC: 25 MG/DL (ref 5–25)
CALCIUM SERPL-MCNC: 9.3 MG/DL (ref 8.3–10.1)
CHLORIDE SERPL-SCNC: 107 MMOL/L (ref 96–108)
CO2 SERPL-SCNC: 23 MMOL/L (ref 21–32)
CREAT SERPL-MCNC: 1.1 MG/DL (ref 0.6–1.3)
ERYTHROCYTE [DISTWIDTH] IN BLOOD BY AUTOMATED COUNT: 13 % (ref 11.6–15.1)
GFR SERPL CREATININE-BSD FRML MDRD: 68 ML/MIN/1.73SQ M
GLUCOSE SERPL-MCNC: 147 MG/DL (ref 65–140)
GLUCOSE SERPL-MCNC: 285 MG/DL (ref 65–140)
GLUCOSE SERPL-MCNC: 291 MG/DL (ref 65–140)
HCT VFR BLD AUTO: 34.6 % (ref 36.5–49.3)
HGB BLD-MCNC: 11.2 G/DL (ref 12–17)
MCH RBC QN AUTO: 29.4 PG (ref 26.8–34.3)
MCHC RBC AUTO-ENTMCNC: 32.4 G/DL (ref 31.4–37.4)
MCV RBC AUTO: 91 FL (ref 82–98)
PLATELET # BLD AUTO: 59 THOUSANDS/UL (ref 149–390)
PMV BLD AUTO: 13.4 FL (ref 8.9–12.7)
POTASSIUM SERPL-SCNC: 4.4 MMOL/L (ref 3.5–5.3)
RBC # BLD AUTO: 3.81 MILLION/UL (ref 3.88–5.62)
SODIUM SERPL-SCNC: 135 MMOL/L (ref 135–147)
UNIT DISPENSE STATUS: NORMAL
UNIT PRODUCT CODE: NORMAL
UNIT PRODUCT VOLUME: 300 ML
UNIT RH: NORMAL
WBC # BLD AUTO: 5.17 THOUSAND/UL (ref 4.31–10.16)

## 2023-04-05 RX ORDER — INSULIN GLARGINE 100 [IU]/ML
40 INJECTION, SOLUTION SUBCUTANEOUS
Status: DISCONTINUED | OUTPATIENT
Start: 2023-04-05 | End: 2023-04-05 | Stop reason: HOSPADM

## 2023-04-05 RX ADMIN — INSULIN LISPRO 6 UNITS: 100 INJECTION, SOLUTION INTRAVENOUS; SUBCUTANEOUS at 07:15

## 2023-04-05 RX ADMIN — DILTIAZEM HYDROCHLORIDE 180 MG: 180 CAPSULE, COATED, EXTENDED RELEASE ORAL at 09:54

## 2023-04-05 RX ADMIN — B-COMPLEX W/ C & FOLIC ACID TAB 1 TABLET: TAB at 09:54

## 2023-04-05 RX ADMIN — GLIMEPIRIDE 4 MG: 2 TABLET ORAL at 07:50

## 2023-04-05 RX ADMIN — INSULIN GLARGINE 40 UNITS: 100 INJECTION, SOLUTION SUBCUTANEOUS at 01:26

## 2023-04-05 RX ADMIN — DOCUSATE SODIUM 100 MG: 100 CAPSULE, LIQUID FILLED ORAL at 09:54

## 2023-04-05 RX ADMIN — CEFAZOLIN SODIUM 2000 MG: 2 SOLUTION INTRAVENOUS at 04:23

## 2023-04-05 NOTE — CONSULTS
Peripheral Nerve Block Follow-up Note - Acute Pain Service    Mike Tan 69 y o  male MRN: [id number]  Unit/Bed#: -01 Encounter: [id number]      Assessment:   Principal Problem:    S/P reverse total shoulder arthroplasty, left  Active Problems:    Post-traumatic osteoarthritis of left shoulder    Mike Tan is a 69y o  year old male with past medical history significant for insulin-dependent type 2 diabetes mellitus with last A1c of 8 4, hypertension, alcoholic liver disease with chronic thrombocytopenia and portal hypertension (CTA 2020), chronic left lower extremity wound, obstructive sleep apnea on CPAP who presents for left shoulder rotator cuff tear arthropathy for surgical repair  Patient underwent left reverse total shoulder arthroplasty with long head biceps tenodesis on 4/4/2023  Patient received left interscalene peripheral nerve block with liposomal bupivacaine for postoperative analgesia  Acute pain service has been consulted for follow-up of peripheral nerve block  On examination, patient states that he is feeling well  He denies any pain at rest   On physical exam he does have mild tenderness to palpation along the left axilla  He has expected motor and sensory deficits from left interscalene block  He denies any shortness of breath  Plan:   Left interscalene block is functioning appropriately with expected sensory and motor deficits  Continue acetaminophen 650 mg p o  every 6 hours as needed for mild pain  Continue oxycodone 2 5 mg p o  every 4 hours as needed for moderate pain  Continue oxycodone 5 mg p o  every 4 hours for severe pain  Discontinue intravenous hydromorphone  Bowel regimen per primary team to avoid opioid-induced constipation  Would avoid NSAIDs in the setting of thrombocytopenia, reduced kidney function and history of insulin-dependent type 2 diabetes mellitus (significant risk factor for coronary artery disease)    APS will sign off at this time   Thank you for the consult  All opioids and other analgesics to be written at discretion of primary team  Please contact Acute Pain Service - [facility] via [employer] from [phone] with additional questions or concerns  See [place]salan or Mann for additional contacts and after hours information  Pain History  Current pain location(s): none  Pain Scale:   0-1  Quality: none  24 hour history: Postoperatively, patient has remained hemodynamically stable and afebrile  Patient is not requiring supplemental oxygen  Most recent laboratory studies significant for hemoglobin 11 2 with MCV 91, platelets 59, creatinine 1 10 with EGFR 68, AST 44, ALT 56, alkaline phosphatase 132, albumin 3 8, total bilirubin 0 90  Opioid requirement previous 24 hours:   Fentanyl 100 mcg IV    Meds/Allergies   all current active meds have been reviewed    Allergies   Allergen Reactions   • Sulfa Antibiotics Hives       Objective     Temp:  [96 7 °F (35 9 °C)-98 2 °F (36 8 °C)] 97 4 °F (36 3 °C)  HR:  [58-94] 86  Resp:  [16-19] 17  BP: (108-140)/(45-77) 109/62    Physical Exam  Vitals and nursing note reviewed  Constitutional:       General: He is not in acute distress  Appearance: Normal appearance  He is not ill-appearing or toxic-appearing  HENT:      Head: Normocephalic and atraumatic  Eyes:      General: No scleral icterus  Conjunctiva/sclera: Conjunctivae normal    Cardiovascular:      Rate and Rhythm: Normal rate  Pulmonary:      Effort: Pulmonary effort is normal  No respiratory distress  Comments: Room air  Skin:     General: Skin is warm and dry  Neurological:      Mental Status: He is alert  Comments: Awake, alert and oriented to person place time situation  Very mild tenderness to palpation of the left axilla  Motor and sensory deficits noted in left interscalene distribution   Psychiatric:         Thought Content:  Thought content normal            Lab Results:   Results from last 7 days   Lab Units 04/05/23  0617   WBC Thousand/uL 5 17   HEMOGLOBIN g/dL 11 2*   HEMATOCRIT % 34 6*   PLATELETS Thousands/uL 59*      Results from last 7 days   Lab Units 04/05/23  0617   POTASSIUM mmol/L 4 4   CHLORIDE mmol/L 107   CO2 mmol/L 23   BUN mg/dL 25   CREATININE mg/dL 1 10   CALCIUM mg/dL 9 3       Imaging Studies: I have personally reviewed pertinent reports  EKG, Pathology, and Other Studies: I have personally reviewed pertinent reports  Please note that the APS provides consultative services regarding pain management only  With the exception of ketamine, peripheral nerve catheters, and epidural infusions (and except when indicated), final decisions regarding starting or changing doses of analgesic medications are at the discretion of the consulting service  Off hours consultation and/or medication management is generally not available      Sally Nj MD  Acute Pain Service

## 2023-04-05 NOTE — PLAN OF CARE
Problem: PAIN - ADULT  Goal: Verbalizes/displays adequate comfort level or baseline comfort level  Description: Interventions:  - Encourage patient to monitor pain and request assistance  - Assess pain using appropriate pain scale  - Administer analgesics based on type and severity of pain and evaluate response  - Implement non-pharmacological measures as appropriate and evaluate response  - Consider cultural and social influences on pain and pain management  - Notify physician/advanced practitioner if interventions unsuccessful or patient reports new pain  Outcome: Adequate for Discharge     Problem: INFECTION - ADULT  Goal: Absence or prevention of progression during hospitalization  Description: INTERVENTIONS:  - Assess and monitor for signs and symptoms of infection  - Monitor lab/diagnostic results  - Monitor all insertion sites, i e  indwelling lines, tubes, and drains  - Monitor endotracheal if appropriate and nasal secretions for changes in amount and color  - [place] appropriate cooling/warming therapies per order  - Administer medications as ordered  - Instruct and encourage patient and family to use good hand hygiene technique  - Identify and instruct in appropriate isolation precautions for identified infection/condition  Outcome: Adequate for Discharge     Problem: DISCHARGE PLANNING  Goal: Discharge to home or other facility with appropriate resources  Description: INTERVENTIONS:  - Identify barriers to discharge w/patient and caregiver  - Arrange for needed discharge resources and transportation as appropriate  - Identify discharge learning needs (meds, wound care, etc )  - Arrange for interpretive services to assist at discharge as needed  - Refer to Case Management Department for coordinating discharge planning if the patient needs post-hospital services based on physician/advanced practitioner order or complex needs related to functional status, cognitive ability, or social support system  Outcome: Adequate for Discharge     Problem: Knowledge Deficit  Goal: Patient/family/caregiver demonstrates understanding of disease process, treatment plan, medications, and discharge instructions  Description: Complete learning assessment and assess knowledge base    Interventions:  - Provide teaching at level of understanding  - Provide teaching via preferred learning methods  Outcome: Adequate for Discharge

## 2023-04-05 NOTE — PROGRESS NOTES
Progress Note - Orthopedics   Kaya Perez 69 y o  male MRN: [id number]  Unit/Bed#: -01      Subjective:    67 y o male no acute events, still with numbness and tingling to left upper extremity in sling  Pain controlled  No nausea or vomiting  Noted high blood glucose overnight      Labs:  0   Lab Value Date/Time    HCT 40 4 04/04/2023 0853    HCT 39 5 03/20/2023 0809    HCT 45 9 09/13/2022 2034    HGB 13 1 04/04/2023 0853    HGB 12 4 03/20/2023 0809    HGB 15 1 09/13/2022 2034    INR 1 25 (H) 01/03/2021 0709    WBC 3 91 (L) 04/04/2023 0853    WBC 3 41 (L) 03/20/2023 0809    WBC 4 02 (L) 09/13/2022 2034    ESR 21 (H) 09/13/2022 2202    CRP 12 6 (H) 09/15/2022 0424       Meds:    Current Facility-Administered Medications:   •  acetaminophen (TYLENOL) tablet 650 mg, 650 mg, Oral, Q6H PRN, Brady Peña PA-C  •  calcium carbonate (TUMS) chewable tablet 1,000 mg, 1,000 mg, Oral, Daily PRN, Brady Peña PA-C  •  diltiazem (CARDIZEM CD) 24 hr capsule 180 mg, 180 mg, Oral, Daily, Brady Peña PA-C  •  docusate sodium (COLACE) capsule 100 mg, 100 mg, Oral, BID, Brady Peña PA-C  •  glimepiride (AMARYL) tablet 4 mg, 4 mg, Oral, Daily With Breakfast, Brady Peña PA-C  •  hydrALAZINE (APRESOLINE) tablet 25 mg, 25 mg, Oral, Q8H PRN, AMOR Diaz  •  HYDROmorphone (DILAUDID) injection 0 5 mg, 0 5 mg, Intravenous, Q2H PRN, Brady Peña PA-C  •  insulin glargine (LANTUS) subcutaneous injection 40 Units 0 4 mL, 40 Units, Subcutaneous, HS, Yoandy Beckett MD, 40 Units at 04/05/23 0126  •  insulin lispro (HumaLOG) 100 units/mL subcutaneous injection 2-12 Units, 2-12 Units, Subcutaneous, TID AC, 4 Units at 04/04/23 1628 **AND** Fingerstick Glucose (POCT), , , TID AC, Brady Peña PA-C  •  lactated ringers bolus 1,000 mL, 1,000 mL, Intravenous, Once PRN **AND** lactated ringers bolus 1,000 mL, 1,000 mL, Intravenous, Once PRN, Brady Peña PA-C  •  lactated ringers infusion, 125 mL/hr, Intravenous, Continuous, Cherelle Foreman MD, New Bag at 04/04/23 1045  •  lactated ringers infusion, 100 mL/hr, Intravenous, Continuous, LUISA Alvares-SKYLA, Stopped at 04/05/23 8649  •  multivitamin stress formula tablet 1 tablet, 1 tablet, Oral, Daily, LUISA Alvares-C, 1 tablet at 04/04/23 1628  •  ondansetron [facility] [address] [facility] injection 4 mg, 4 mg, Intravenous, Q6H PRN, Brady Peña PA-C  •  oxyCODONE (ROXICODONE) IR tablet 2 5 mg, 2 5 mg, Oral, Q4H PRN, Brady Peña PA-C  •  oxyCODONE (ROXICODONE) IR tablet 5 mg, 5 mg, Oral, Q4H PRN, Brady Peña PA-C  •  traZODone (DESYREL) tablet 50 mg, 50 mg, Oral, HS, Brady Peña PA-C, 50 mg at 04/04/23 2234    Blood Culture:   Lab Results   Component Value Date    BLOODCX No Growth After 5 Days  09/13/2022    BLOODCX No Growth After 5 Days  09/13/2022       Wound Culture:   Lab Results   Component Value Date    WOUNDCULT 1+ Growth of Klebsiella oxytoca (A) 09/14/2022    WOUNDCULT 1+ Growth of 09/14/2022       Ins and Outs:  I/O last 24 hours: In: 2896 7 [I V :2600; Blood:296 7]  Out: 150 [Blood:150]          Physical:  Vitals:    04/05/23 0332   BP: 108/57   Pulse: 91   Resp:    Temp: 97 5 °F (36 4 °C)   SpO2: 93%     Musculoskeletal: left Upper Extremity  General:Awake alert  No acute distress resting comfortably in bed   HEENT: Trachea midline  Cardiovascular: No JVD  Respiratory: No audible wheezing, stridor, signs of respiratory distress  Abdomen: Nondistended      · Skin warm dry   · Dressing clean dry intact left anterior shoulder  · TTP none  · SILT m/r/u, slight decrease in median distribution still from block but he says resolving   · Motor intact ain/pin/m/r/u, 4/5 likely due to block  · 2+ radial pulse, hand warm brisk cap refill      Assessment:    67 y o male POD1 left rev TSA doing well   Continue to monitor blood glucose likely discharge today     Plan:  · NEY LEVY  · Diet: Regular  · Abx: Ancef x 2 postop  · Pain: oxy  · DVT Prophylaxis: mechanical  · Wound dressing: mepilex leave until followup  · Home medications: per medicine team  · Post op PT/OT eval  · Body mass index is 35 87 kg/m²  Moderately obese Recommend nutrition and physical activity    · Dispo: Ortho will follow    Lai Rubio MD  6:31 AM

## 2023-04-05 NOTE — DISCHARGE SUMMARY
ORTHOPEDICS DISCHARGE SUMMARY  Uday Cruz 69 y o  male MRN: [id number]  Unit/Bed#: -74    Attending Physician: Monet Friend    Admitting diagnosis: Primary osteoarthritis of left shoulder [M19 012]    Discharge diagnosis: Primary osteoarthritis of left shoulder [M19 012]    Date of admission: 4/4/2023    Date of discharge: 04/05/23         Procedure: L rTSA    HPI:  This is a 69y o  year old male that presented to the office with signs and symptoms of left shoulder osteoarthritis and/or other pathology  They tried and failed conservative treatment measures and wished to proceed with surgical intervention  The risks, benefits, and complications of the procedure were discussed with the patient and informed consent was obtained  Hospital Course: The patient was admitted to the hospital on 4/4/2023 and underwent an uncomplicated left reverse total shoulder arthroplasty  They were transferred to the floor after a brief stay in the post-anesthesia care unit  Their pain was well managed with IV and oral pain medications  On discharge date pt was cleared by PT and the medicine team and determined to be safe for discharge    0   Lab Value Date/Time    HGB 11 2 (L) 04/05/2023 0617    HGB 13 1 04/04/2023 0853    HGB 12 4 03/20/2023 0809    HGB 15 1 09/13/2022 2034    HGB 12 7 05/19/2022 0429    HGB 12 4 05/18/2022 0434    HGB 12 7 05/17/2022 1527    HGB 13 9 03/27/2022 2120    HGB 8 6 (L) 01/03/2021 0512    HGB 8 4 (L) 01/02/2021 0510    HGB 9 0 (L) 01/01/2021 0555    HGB 9 0 (L) 12/30/2020 0554    HGB 8 2 (L) 12/29/2020 0228    HGB 7 8 (L) 12/27/2020 0532    HGB 7 8 (L) 12/26/2020 0503    HGB 7 9 (L) 12/25/2020 0227          Body mass index is 35 87 kg/m²  moderately obese  Recommend behavior modifications, nutrition and physical activity  Discharge Instructions: The patient was discharged nonweight bearing to the left upper extremity  Refrain from PT/OT until cleared by surgeon   Take pain medications as instructed  Discharge Medications: For the complete list of discharge medications, please refer to the patient's medication reconciliation

## 2023-04-05 NOTE — PHYSICAL THERAPY NOTE
Physical Therapy Screen    Patient Name: Mahad Junior    LJYVM'R Date: 4/5/2023     Problem List  Principal Problem:    S/P reverse total shoulder arthroplasty, left  Active Problems:    Post-traumatic osteoarthritis of left shoulder       Past Medical History  Past Medical History:   Diagnosis Date   • Arrhythmia    • CPAP (continuous positive airway pressure) dependence    • Diabetes mellitus ([facility] [address] )    • History of echocardiogram 11/14/2017    showed EF of 50-55 percentWith moderate LVH and left ventricle diastolic dysfunction  Left atrium was moderately enlarged  Trace MR noted  • History of Holter monitoring 11/21/2017    showed baseline rhythm of sinus origin with an average heart it of 61 bpm  The lowest heart rate was 49 and the highest heart rate was 10 8 bpm  There were rare single VPCs, and frequent PACs representing 3 2% of total beats  There were several episodes of sinus arrhythmias with sinus bradycardia and heart rate ranging from 40-90 bpm  No sustained dysrhythmias, or pauses noted  The patient did not   • Hypertension    • Liver disease    • Obese    • Sleep apnea         Past Surgical History  Past Surgical History:   Procedure Laterality Date   • CATARACT EXTRACTION     • EYE SURGERY Left 1997   • HERNIA REPAIR  [id number]   • KNEE ARTHROPLASTY Right 2008   • SHOULDER SURGERY Right 2002        PT orders received and chart reviewed  Per OT pt is Supervision with all mobility and has supportive spouse who is able to assist as needed  OT reviewed all precautions/ restrictions with pt and pt plans to attend OPPT upon d/c from hospital  No acute care PT needs at this time  Will D/C PT orders      Elke Baltazar, PT,DPT

## 2023-04-05 NOTE — PLAN OF CARE
Problem: PAIN - ADULT  Goal: Verbalizes/displays adequate comfort level or baseline comfort level  Description: Interventions:  - Encourage patient to monitor pain and request assistance  - Assess pain using appropriate pain scale  - Administer analgesics based on type and severity of pain and evaluate response  - Implement non-pharmacological measures as appropriate and evaluate response  - Consider cultural and social influences on pain and pain management  - Notify physician/advanced practitioner if interventions unsuccessful or patient reports new pain  Outcome: Progressing     Problem: INFECTION - ADULT  Goal: Absence or prevention of progression during hospitalization  Description: INTERVENTIONS:  - Assess and monitor for signs and symptoms of infection  - Monitor lab/diagnostic results  - Monitor all insertion sites, i e  indwelling lines, tubes, and drains  - Monitor endotracheal if appropriate and nasal secretions for changes in amount and color  - [place] appropriate cooling/warming therapies per order  - Administer medications as ordered  - Instruct and encourage patient and family to use good hand hygiene technique  - Identify and instruct in appropriate isolation precautions for identified infection/condition  Outcome: Progressing     Problem: DISCHARGE PLANNING  Goal: Discharge to home or other facility with appropriate resources  Description: INTERVENTIONS:  - Identify barriers to discharge w/patient and caregiver  - Arrange for needed discharge resources and transportation as appropriate  - Identify discharge learning needs (meds, wound care, etc )  - Arrange for interpretive services to assist at discharge as needed  - Refer to Case Management Department for coordinating discharge planning if the patient needs post-hospital services based on physician/advanced practitioner order or complex needs related to functional status, cognitive ability, or social support system  Outcome: Progressing Problem: Knowledge Deficit  Goal: Patient/family/caregiver demonstrates understanding of disease process, treatment plan, medications, and discharge instructions  Description: Complete learning assessment and assess knowledge base    Interventions:  - Provide teaching at level of understanding  - Provide teaching via preferred learning methods  Outcome: Progressing

## 2023-04-05 NOTE — OCCUPATIONAL THERAPY NOTE
Occupational Therapy Evaluation & Treatment     Patient Name: Martha Levy  MNHBI'Q Date: 4/5/2023  Problem List  Principal Problem:    S/P reverse total shoulder arthroplasty, left  Active Problems:    Post-traumatic osteoarthritis of left shoulder    Past Medical History  Past Medical History:   Diagnosis Date    Arrhythmia     CPAP (continuous positive airway pressure) dependence     Diabetes mellitus ([address] )     History of echocardiogram 11/14/2017    showed EF of 50-55 percentWith moderate LVH and left ventricle diastolic dysfunction  Left atrium was moderately enlarged  Trace MR noted  History of Holter monitoring 11/21/2017    showed baseline rhythm of sinus origin with an average heart it of 61 bpm  The lowest heart rate was 49 and the highest heart rate was 10 8 bpm  There were rare single VPCs, and frequent PACs representing 3 2% of total beats  There were several episodes of sinus arrhythmias with sinus bradycardia and heart rate ranging from 40-90 bpm  No sustained dysrhythmias, or pauses noted  The patient did not    Hypertension     Liver disease     Obese     Sleep apnea      Past Surgical History  Past Surgical History:   Procedure Laterality Date    CATARACT EXTRACTION      EYE SURGERY Left 1997    HERNIA REPAIR  [phone]    KNEE ARTHROPLASTY Right 2008    NM ARTHROPLASTY GLENOHUMERAL JOINT TOTAL SHOULDER Left 4/4/2023    Procedure: ARTHROPLASTY SHOULDER REVERSE;  Surgeon: Brigida Mayo MD;  Location: [facility];  Service: Orthopedics    SHOULDER SURGERY Right 2002 04/05/23 0831   OT Last Visit   OT Visit Date 04/05/23   Note Type   Note type Evaluation  (& treatment)   Pain Assessment   Pain Assessment Tool 0-10   Pain Score 1   Pain Location/Orientation Orientation: Left; Location: Shoulder   Hospital Pain Intervention(s) Repositioned; Ambulation/increased activity   Restrictions/Precautions   Weight Bearing Precautions Per Order Yes   LUE Weight Bearing Per Order (S) NWB  (in abduction sling)   Braces or Orthoses   (abduction sling)   Other Precautions Pain  (TSA precautions (rTSA with biceps tenodesis))   Home Living   Type of [address] [place] [address] One level  (1 DEIDRE)   Bathroom Shower/Tub Tub/shower unit   Bathroom Toilet Standard   Home Equipment   (none)   Prior Function   Level of [place] Independent with ADLs; Independent with functional mobility   Lives With Spouse   IADLs Independent with driving; Independent with meal prep; Independent with medication management   Falls in the last 6 months 0   Comments Pt reports spouse works from home and is able to assist as needed   Lifestyle   Autonomy At baseline pt was completing all ADLs/IADLs IND, (+) working and driving  Reciprocal Relationships supportive spouse   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)   Intrinsic Gratification active PTA   ADL   Eating Assistance 5  Supervision/Setup   Grooming Assistance 5  Supervision/Setup   UB [address] 3  Moderate [address]  [address]  3  Moderate Assistance    Afroditis [address]  4  Minimal Assistance   Transfers   Sit to Stand 5  Supervision   Additional items Increased time required   Stand to Sit 5  Supervision   Additional items Increased time required   Additional Comments no AD  Good adherence to precautions   Functional Mobility   Functional Mobility 5  Supervision   Additional Comments PT ABLE TO COMPLETE FUNCTIONAL MOBILITY FOR GREATER THAN 100+ FEET AS WELL AS 2 STEPS FOR STAIR MANAGEMENT AT AN OVERALL SUPERVISION LEVEL IN ORDER TO SAFELY ENTER/EXIT HOME ENVIRONMENT AND COMPLETE COMMUNITY MOBILITY     Balance   Static Sitting Good   Dynamic Sitting Good   Static Standing Good   Dynamic Standing Good   Activity Tolerance   Activity Tolerance Patient tolerated treatment well   Medical Staff Made Aware Discussed with PT   Nurse Made Aware yes RUE Assessment   RUE Assessment WFL   LUE Assessment   LUE Assessment X  (NWB in abduction sling)   Hand Function   Gross Motor Coordination Functional   Fine Motor Coordination   (reports tingling in L 1st 3 digits which has been improving)   Psychosocial   Psychosocial (WDL) WDL   Cognition   Overall Cognitive Status WFL   Arousal/Participation Alert; Cooperative   Attention Within functional limits   Orientation Level Oriented X4   Memory Within functional limits   Following Commands Follows all commands and directions without difficulty   Comments Pt demo Good adherence to precautions throughout session   Assessment   Limitation Decreased ADL status; Decreased high-level ADLs; Decreased self-care trans   Prognosis Good   Assessment Pt is a 69 y o  male who was [facility] to [facility] on 4/4/2023; pt s/p Left Reverse Total Shoulder Arthroplasty with Long Head Biceps Tenodesis 4/4  Pt  has a past medical history of Arrhythmia, CPAP (continuous positive airway pressure) dependence, Diabetes mellitus ([facility] [address] ), History of echocardiogram (11/14/2017), History of Holter monitoring (11/21/2017), Hypertension, Liver disease, Obese, and Sleep apnea  At baseline pt was completing all ADLs/IADLs IND, (+) working and driving  Pt lives with his spouse in a 1  with 1 DEIDRE  Pt currently performs UB ADLs at MOD A level and LB ADLs at SUP level  PT ABLE TO COMPLETE FUNCTIONAL MOBILITY FOR GREATER THAN 100+ FEET AS WELL AS 2 STEPS FOR STAIR MANAGEMENT AT AN OVERALL SUPERVISION LEVEL IN ORDER TO SAFELY ENTER/EXIT HOME ENVIRONMENT AND COMPLETE COMMUNITY MOBILITY  From OT standpoint, recommend HOME WITH FAMILY SUPPORT upon D/C  Pt to be seen for 1 additional session for sling management/pt education  Goals   Patient Goals to go home today   LTG Time Frame 3-7   Long Term Goal #1 see goals below   Plan   Treatment Interventions ADL retraining;Patient/family training; Compensatory technique education   Goal Expiration Date 04/12/23   OT Treatment Day 1   OT Frequency   (1 additional session for pt education)   Recommendation   OT Discharge Recommendation No rehabilitation needs  (home with family support)   AM-PAC Daily Activity Inpatient   Lower Body Dressing 3   Bathing 2   Toileting 3   Upper Body Dressing 2   Grooming 3   Eating 3   Daily Activity Raw Score 16   Daily Activity Standardized Score (Calc for Raw Score >=11) 35 96   AM-PAC Applied Cognition Inpatient   Following a Speech/Presentation 4   Understanding Ordinary Conversation 4   Taking Medications 4   Remembering Where Things Are Placed or Put Away 4   Remembering List of 4-5 Errands 4   Taking Care of Complicated Tasks 4   Applied Cognition Raw Score 24   Applied Cognition Standardized Score 62 21   Additional Treatment Session   Start Time 1643   End Time 0831   Treatment Assessment Pt seen for additional treatment session  Pt provided with Pt educational handout: After your total shoulder; education included TSA precautions, NWB status, 1 handed dressing techniques, and pendulum exercises  Pt completed UB and LB dressing while seated in bedside recliner  Required MOD A to don button down shirt  Pt requiring MOD A to don sling; demo Good carryover of precautions during sling management  Required SUP to don underwear and pants  Pt reports spouse is able to assist with dressing and sling management at home  Pt has adequate home support and reports no further questions/concerns  No further acute OT services recommended at this time - OT to sign off  End of Consult   Patient Position at End of Consult Bedside chair; All needs within reach   Nurse Communication Nurse aware of consult       Goal:  -Pt will demonstrate G carryover of learned WBS, TSA PRECAUTIONS, safety techniques and proper body mechanics during ADL tasks          Leona James, OTR/L

## 2023-04-05 NOTE — PROGRESS NOTES
Internal Medicine Progress Note  Patient: Jason Neal  Age/sex: 69 y o  male  Medical Record #: [id number]      ASSESSMENT/PLAN: (Interval History)  Jason Neal is seen and examined and management for following issues:    Status Post left reverse Total SHOULDER ARTHROPLASTY  • Pain controlled  • Continue encourage incentive spirometry; monitor fever curve  • DVT prophylaxis in place and reviewed  Results from last 7 days   Lab Units 04/05/23  0617   WBC Thousand/uL 5 17   HEMOGLOBIN g/dL 11 2*   HEMATOCRIT % 34 6*   PLATELETS Thousands/uL 59*   •       Operative acute blood loss anemia  · Decrease in hgb levels from preop labs results; suspect due to post operation extravasation losses along with potential dilutional effects of fluids  · Monitor follow up CBC post intervention to trend effect    DM II  • Hgb A1c 8 2  • Home meds: Lantus 40 units qhs/glimepiride daily  • Switch to DM diet  • Add sliding scale and coverage  • Resume medications as above  • Monitor trend     Thrombocytopenia  • Baseline platelets in the 00-89 range  • Monitor for bleeding     HTN  • Hold maxide/cozaar in the post operative setting to avoid hypotension/ABILIO  • OK to resume on dc  • Resume cardizem with appropriate hold parameters  • Add hydralazine as needed for SBP >160  • Monitor trend     Etoh cirrhosis  • Continue to abstain from etoh  • F/u with GI as scheduled     Chronic LLE wound  • Healed cleared by podiatry  • F/b podiatry recommend continued offloading and pads to be placed to prevent further opening  •                PRE-OP HGB LEVEL: 13 1  The above assessment and plan was reviewed and updated as determined by my evaluation of the patient on 4/5/2023      Labs:   Results from last 7 days   Lab Units 04/05/23  0617 04/04/23  0853   WBC Thousand/uL 5 17 3 91*   HEMOGLOBIN g/dL 11 2* 13 1   HEMATOCRIT % 34 6* 40 4   PLATELETS Thousands/uL 59* 59*     Results from last 7 days   Lab Units 04/05/23  0617   SODIUM mmol/L 135 POTASSIUM mmol/L 4 4   CHLORIDE mmol/L 107   CO2 mmol/L 23   BUN mg/dL 25   CREATININE mg/dL 1 10   CALCIUM mg/dL 9 3             Results from last 7 days   Lab Units 04/05/23  0557 04/04/23 2033 04/04/23  1610   POC GLUCOSE mg/dl 291* 336* 235*       Review of Scheduled Meds:  Current Facility-Administered Medications   Medication Dose Route Frequency Provider Last Rate   • acetaminophen  650 mg Oral Q6H PRN Davie Ruggiero PA-C     • calcium carbonate  1,000 mg Oral Daily PRN Davie Ruggiero PA-C     • diltiazem  180 mg Oral Daily Davie Ruggiero PA-C     • docusate sodium  100 mg Oral BID Davie Ruggiero PA-C     • glimepiride  4 mg Oral Daily With Breakfast Davie Ruggiero PA-C     • hydrALAZINE  25 mg Oral Q8H PRN AMOR Louise     • HYDROmorphone  0 5 mg Intravenous Q2H PRN Davie Ruggiero PA-C     • insulin glargine  40 Units Subcutaneous HS Josefina Sanchez MD     • insulin lispro  2-12 Units Subcutaneous TID AC Davie Ruggiero PA-C     • lactated ringers  1,000 mL Intravenous Once PRN Davie Ruggiero PA-C      And   • lactated ringers  1,000 mL Intravenous Once PRN Davie Ruggiero PA-C     • lactated ringers  125 mL/hr Intravenous Continuous Louise Hodge MD     • lactated ringers  100 mL/hr Intravenous Continuous Davie Ruggiero PA-C Stopped (04/05/23 0549)   • multivitamin stress formula  1 tablet Oral Daily Davie Ruggiero PA-C     • ondansetron  4 mg Intravenous Q6H PRN Davie Ruggiero PA-C     • oxyCODONE  2 5 mg Oral Q4H PRN Davie Ruggiero PA-C     • oxyCODONE  5 mg Oral Q4H PRN Davie Ruggiero PA-C     • traZODone  50 mg Oral HS Davie Ruggiero PA-C         Subjective/ HPI: Monica Jacobs seen and examined  Patient's overnight issues or events were reviewed with nursing or staff during rounds or morning huddle session   New or overnight issues include the following:     Pt without any overnight events or reported nursing issues      ROS:   A 10 point ROS was performed; negative except as noted above  Imaging:     XR shoulder left 1 view    (Results Pending)       *Labs /Radiology studies Reviewed  *Medications  reviewed and reconciled as needed  *Please refer to order section for additional ordered labs studies  *Case discussed with primary attending during morning huddle case rounds    Physical Examination:  Vitals:   Vitals:    04/04/23 1909 04/05/23 0019 04/05/23 0332 04/05/23 0736   BP: 127/77 108/57 108/57 109/62   Pulse: 91 94 91 86   Resp: 19   17   Temp: (!) 97 4 °F (36 3 °C) 98 2 °F (36 8 °C) 97 5 °F (36 4 °C) (!) 97 4 °F (36 3 °C)   TempSrc:       SpO2: 94% 92% 93% 95%   Weight:       Height:           General Appearance: no distress, conversive  HEENT: PERRLA, conjuctiva normal; oropharynx clear; mucous membranes moist;   Neck:  Supple, no lymphadenopathy or thyromegaly  Lungs: CTA, normal respiratory effort, no retractions, expiratory effort normal  CV: regular rate and rhythm , PMI normal   ABD: soft non tender, no masses , no hepatic or splenomegaly  EXT: DP pulses intact, no lymphadenopathy, no edema; left shoulder dressing in place  Skin: normal turgor, normal texture, no rash  Psych: affect normal, mood normal  Neuro: AAOx3      The above physical exam was reviewed and updated as determined by my evaluation of the patient on 4/5/2023  Invasive Devices     Peripheral Intravenous Line  Duration           Peripheral IV 04/04/23 Distal;Right;Upper;Ventral (anterior) Arm <1 day                   VTE Pharmacologic Prophylaxis: Reason for no pharmacologic prophylaxis ambulatory  Code Status: Level 1 - Full Code  Current Length of Stay: 0 day(s)      Total floor / unit time spent today 30 minutes  Coordination of patient's care was performed in conjunction with primary service   Time invested included review of patient's labs, vitals, and management of their comorbidities with continued monitoring, examination of patient as well as answering patient questions, documenting her findings and creating progress note in electronic medical record,  ordering appropriate diagnostic testing  Medical decision making for the day was made by supervising physician unless otherwise noted in their attestation statement  ** Please Note:  voice to text software may have been used in the creation of this document   Although proof errors in transcription or interpretation are a potential of such software**

## 2023-04-17 PROBLEM — Z47.1 AFTERCARE FOLLOWING LEFT SHOULDER JOINT REPLACEMENT SURGERY: Status: ACTIVE | Noted: 2023-04-17

## 2023-04-17 PROBLEM — M19.012 PRIMARY OSTEOARTHRITIS OF LEFT SHOULDER: Status: ACTIVE | Noted: 2023-04-17

## 2023-04-17 PROBLEM — Z96.612 AFTERCARE FOLLOWING LEFT SHOULDER JOINT REPLACEMENT SURGERY: Status: ACTIVE | Noted: 2023-04-17

## 2023-04-24 ENCOUNTER — OFFICE VISIT (OUTPATIENT)
Dept: PHYSICAL THERAPY | Facility: CLINIC | Age: 68
End: 2023-04-24

## 2023-04-24 DIAGNOSIS — M25.512 CHRONIC LEFT SHOULDER PAIN: ICD-10-CM

## 2023-04-24 DIAGNOSIS — G89.29 CHRONIC LEFT SHOULDER PAIN: ICD-10-CM

## 2023-04-24 DIAGNOSIS — M19.012 PRIMARY OSTEOARTHRITIS OF LEFT SHOULDER: ICD-10-CM

## 2023-04-24 DIAGNOSIS — Z96.612 S/P REVERSE TOTAL SHOULDER ARTHROPLASTY, LEFT: Primary | ICD-10-CM

## 2023-04-24 NOTE — PROGRESS NOTES
Daily Note     Today's date: 2023  Patient name: Rylie Flores  : 1955  MRN: [id number]  Referring provider: Radha Welsh  Dx:   Encounter Diagnosis     ICD-10-CM    1  S/P reverse total shoulder arthroplasty, left  Z96 612       2  Primary osteoarthritis of left shoulder  M19 012       3  Chronic left shoulder pain  M25 512     G89 29                      Subjective: Patient reports continued improvements with regards to his shoulder  Objective: See treatment diary below  PROM   ER: 25deg  Flexion: 111deg    Assessment: Tolerated treatment well  Steady gains noted in shoulder PROM progressing well w/ protocol  Patient demonstrated fatigue post treatment, exhibited good technique with therapeutic exercises and would benefit from continued PT  Plan: Continue per plan of care  Precautions: PROTOCOL  Sling for at least 2-4 weeks  DOS 23  Past Medical History:   Diagnosis Date   • Arrhythmia    • CPAP (continuous positive airway pressure) dependence    • Diabetes mellitus ([facility] [address] )    • History of echocardiogram 2017    showed EF of 50-55 percentWith moderate LVH and left ventricle diastolic dysfunction  Left atrium was moderately enlarged  Trace MR noted  • History of Holter monitoring 2017    showed baseline rhythm of sinus origin with an average heart it of 61 bpm  The lowest heart rate was 49 and the highest heart rate was 10 8 bpm  There were rare single VPCs, and frequent PACs representing 3 2% of total beats  There were several episodes of sinus arrhythmias with sinus bradycardia and heart rate ranging from 40-90 bpm  No sustained dysrhythmias, or pauses noted   The patient did not   • Hypertension    • Liver disease    • Obese    • Sleep apnea        SOC: 3/20/222   FOTO: TBA on post op IE  POC Expiration: 2022  Daily Treatment Log:  Date 23       Visit # 6       Manual                        Ther Exer 15'       Pendulums Circles 20xeea  AP/ML 20xa "     Deltoid isos 2x10 5\"       Scap retraction Seated 2x10        Elbow flex/ext 3x10 std        Table slides flexion/scap 2x10 5\" ea       Supine ER w/ cane 2x10 supine        Supination B/L 2x10 Std        Sup flex w/ opp UE 2x10        Std scaption AAROM w/ cane 2x10                                HEP        Ther Activ                                                        NMReed        Gripper  2x10 yellow 20#                                       Modalities        CP                          Access Code: [email or web address]  URL: [email or web address]  Date: 04/17/2023  Prepared by: Julita Grant    Exercises  - Isometric Shoulder Flexion at Wall  - 1-2 x daily - 7 x weekly - 1 sets - 10 reps - 5 hold  - Circular Shoulder Pendulum with Table Support  - 1-2 x daily - 7 x weekly - 2 sets - 10 reps  - Seated Bilateral Shoulder Flexion Towel Slide at Table Top  - 1 x daily - 7 x weekly - 2 sets - 10 reps - 5 hold  - Standing Elbow Flexion Extension AROM  - 1 x daily - 7 x weekly - 2 sets - 10 reps  - Seated Forearm Pronation and Supination AROM  - 1 x daily - 7 x weekly - 2 sets - 10 reps  - Supine Shoulder External Rotation with Dowel  - 1 x daily - 7 x weekly - 2 sets - 10 reps - 5 hold     "

## 2023-04-25 ENCOUNTER — OFFICE VISIT (OUTPATIENT)
Dept: PODIATRY | Facility: CLINIC | Age: 68
End: 2023-04-25

## 2023-04-25 VITALS
HEART RATE: 79 BPM | SYSTOLIC BLOOD PRESSURE: 128 MMHG | WEIGHT: 252 LBS | HEIGHT: 70 IN | DIASTOLIC BLOOD PRESSURE: 72 MMHG | BODY MASS INDEX: 36.08 KG/M2

## 2023-04-25 DIAGNOSIS — E11.40 TYPE 2 DIABETES MELLITUS WITH DIABETIC NEUROPATHY, WITH LONG-TERM CURRENT USE OF INSULIN (HCC): ICD-10-CM

## 2023-04-25 DIAGNOSIS — Z79.4 TYPE 2 DIABETES MELLITUS WITH DIABETIC NEUROPATHY, WITH LONG-TERM CURRENT USE OF INSULIN (HCC): ICD-10-CM

## 2023-04-25 DIAGNOSIS — L97.521 ULCER OF LEFT FOOT, LIMITED TO BREAKDOWN OF SKIN (HCC): Primary | ICD-10-CM

## 2023-04-25 NOTE — PROGRESS NOTES
Patient ID: Ignacia Brody is a 69 y o  male Date of Birth 1955       Assessment:    No problem-specific Assessment & Plan notes found for this encounter  Diagnoses and all orders for this visit:    Ulcer of left foot, limited to breakdown of skin ([facility] )    Type 2 diabetes mellitus with diabetic neuropathy, with long-term current use of insulin ([facility] )          Procedures      Plan:  1  Reviewed medical records  2  Continue horse shoe pad  Okay to wear diabetic shoes  Instructed skin care and protection  3   Recommended to obtain carbon fiber insole  4  RA in 5 weeks  Subjective:        RYAN  Elissa Jacobs presents for evaluation of left foot ulcer  He feels well  No drainage  No pain  BS under control  No new complaint  S/P left shoulder surgery  The following portions of the patient's history were reviewed and updated as appropriate: allergies, current medications, past family history, past medical history, past social history, past surgical history and problem list       PAST MEDICAL HISTORY:  Past Medical History:   Diagnosis Date   • Arrhythmia    • CPAP (continuous positive airway pressure) dependence    • Diabetes mellitus ([facility] )    • History of echocardiogram 11/14/2017    showed EF of 50-55 percentWith moderate LVH and left ventricle diastolic dysfunction  Left atrium was moderately enlarged  Trace MR noted  • History of Holter monitoring 11/21/2017    showed baseline rhythm of sinus origin with an average heart it of 61 bpm  The lowest heart rate was 49 and the highest heart rate was 10 8 bpm  There were rare single VPCs, and frequent PACs representing 3 2% of total beats  There were several episodes of sinus arrhythmias with sinus bradycardia and heart rate ranging from 40-90 bpm  No sustained dysrhythmias, or pauses noted   The patient did not   • Hypertension    • Liver disease    • Obese    • Sleep apnea        PAST SURGICAL HISTORY:  Past Surgical History:   Procedure Laterality Date   • CATARACT EXTRACTION     • EYE SURGERY Left    • HERNIA REPAIR  [id number]   • KNEE ARTHROPLASTY Right    • VT ARTHROPLASTY GLENOHUMERAL JOINT TOTAL SHOULDER Left 2023    Procedure: ARTHROPLASTY SHOULDER REVERSE;  Surgeon: Mirza Mera MD;  Location: [facility];  Service: Orthopedics   • SHOULDER SURGERY Right         ALLERGIES:  Sulfa antibiotics    MEDICATIONS:  Current Outpatient Medications   Medication Sig Dispense Refill   • diltiazem (CARDIZEM CD) 180 mg 24 hr capsule      • glimepiride (AMARYL) 4 mg tablet      • Insulin Glargine Solostar (Lantus SoloStar) 100 UNIT/ML SOPN Inject 0 4 mL (40 Units total) under the skin daily at bedtime     • Insulin Pen Needle (BD Pen Needle Nayla 2nd Gen) 32G X 4 MM MISC For use with insulin pen  Pharmacy may dispense brand covered by insurance  100 each 0   • losartan (COZAAR) 100 MG tablet      • Multiple Vitamin (multivitamin) capsule Take 1 capsule by mouth daily 21 capsule 0   • traZODone (DESYREL) 50 mg tablet bedtime     • triamterene-hydrochlorothiazide (DYAZIDE) 37 5-25 mg per capsule      • oxyCODONE (ROXICODONE) 5 immediate release tablet 1 tablets every 6 hours as needed for severe shoulder pain only  13 tablet 0     No current facility-administered medications for this visit         SOCIAL HISTORY:  Social History     Socioeconomic History   • Marital status: /Civil Union     Spouse name: None   • Number of children: 2   • Years of education: 16   • Highest education level: Some college, no degree   Occupational History   • None   Tobacco Use   • Smoking status: Former     Packs/day: 0 50     Years: 20 00     Pack years: 10 00     Types: Cigarettes     Quit date:      Years since quittin 3   • Smokeless tobacco: Never   Vaping Use   • Vaping Use: Never used   Substance and Sexual Activity   • Alcohol use: Not Currently     Comment: quit    • Drug use: Never   • Sexual activity: Not Currently "  Other Topics Concern   • None   Social History Narrative    · Most recent tobacco use screenin2018      · Do you currently or have you served in the [employer]:   No      · Live alone or with others:   with others      · High blood pressure: Yes      · Exercise level:   Occasional      · Overweight:   Yes      · Obese: Yes      · Diabetes:   Yes      Social Determinants of Health     Financial Resource Strain: Not on file   Food Insecurity: Not on file   Transportation Needs: Not on file   Physical Activity: Not on file   Stress: Not on file   Social Connections: Not on file   Intimate Partner Violence: Not on file   Housing Stability: Not on file      Review of Systems   Constitutional: Negative for chills and fever  Respiratory: Negative for shortness of breath  Cardiovascular: Negative for chest pain  Gastrointestinal: Negative for diarrhea, nausea and vomiting  Musculoskeletal: Negative for gait problem  Neurological: Positive for numbness  Objective:            /72   Pulse 79   Ht 5' 10\" (1 778 m)   Wt 114 kg (252 lb)   BMI 36 16 kg/m²     Physical Exam  Vitals reviewed  Constitutional:       General: He is not in acute distress  Appearance: Normal appearance  He is not ill-appearing or toxic-appearing  Cardiovascular:      Rate and Rhythm: Normal rate and regular rhythm  Pulses: Normal pulses  Pulmonary:      Effort: Pulmonary effort is normal  No respiratory distress  Musculoskeletal:         General: Deformity present  No tenderness or signs of injury  Skin:     General: Skin is warm  Coloration: Skin is not cyanotic or mottled  Findings: No abscess, erythema or rash  Nails: There is no clubbing  Comments: Wound remains healed with minimal keratosis left submet 2  No signs of infection  Neurological:      General: No focal deficit present  Mental Status: He is alert and oriented to person, place, and time       " Cranial Nerves: No cranial nerve deficit  Sensory: Sensory deficit present  Coordination: Coordination normal    Psychiatric:         Mood and Affect: Mood normal          Behavior: Behavior normal          Thought Content:  Thought content normal          Judgment: Judgment normal

## 2023-04-27 ENCOUNTER — OFFICE VISIT (OUTPATIENT)
Dept: PHYSICAL THERAPY | Facility: CLINIC | Age: 68
End: 2023-04-27

## 2023-04-27 DIAGNOSIS — M19.012 PRIMARY OSTEOARTHRITIS OF LEFT SHOULDER: ICD-10-CM

## 2023-04-27 DIAGNOSIS — Z96.612 S/P REVERSE TOTAL SHOULDER ARTHROPLASTY, LEFT: Primary | ICD-10-CM

## 2023-04-27 NOTE — PROGRESS NOTES
Daily Note     Today's date: 2023  Patient name: Edgar Alba  : 1955  MRN: [id number]  Referring provider: Dianne Taylor  Dx:   Encounter Diagnosis     ICD-10-CM    1  S/P reverse total shoulder arthroplasty, left  [id number] [id number]       2  Primary osteoarthritis of left shoulder  M19 012                      Subjective: Patient reports continued improvements with regards to his shoulder  Objective: See treatment diary below  PROM   ER: 35 deg  Flexion: 115 deg    Assessment: Tolerated treatment well  Good tolerance to intro of shoulder AAROM, most difficuly noted w/ flexion  Patient demonstrated fatigue post treatment, exhibited good technique with therapeutic exercises and would benefit from continued PT  Plan: Continue per plan of care  Precautions: PROTOCOL  Sling for at least 2-4 weeks  DOS 23  Past Medical History:   Diagnosis Date   • Arrhythmia    • CPAP (continuous positive airway pressure) dependence    • Diabetes mellitus ([facility] )    • History of echocardiogram 2017    showed EF of 50-55 percentWith moderate LVH and left ventricle diastolic dysfunction  Left atrium was moderately enlarged  Trace MR noted  • History of Holter monitoring 2017    showed baseline rhythm of sinus origin with an average heart it of 61 bpm  The lowest heart rate was 49 and the highest heart rate was 10 8 bpm  There were rare single VPCs, and frequent PACs representing 3 2% of total beats  There were several episodes of sinus arrhythmias with sinus bradycardia and heart rate ranging from 40-90 bpm  No sustained dysrhythmias, or pauses noted   The patient did not   • Hypertension    • Liver disease    • Obese    • Sleep apnea        SOC: 3/20/222   FOTO: TBA on post op IE  POC Expiration: 2022  Daily Treatment Log:  Date 23      Visit # 6 7 FOTO      Manual                        Ther Exer 15' 45'      Pendulums Circles 20xea  AP/ML 20xa Circles 20xea  AP/ML 20xea "    Deltoid isos 2x10 5\" 2x10 5\"       Scap retraction Seated 2x10        Elbow flex/ext 3x10 std  3x10 std      Table slides flexion/scap 2x10 5\" ea 2x10 5\" ea       Supine ER w/ cane 2x10 supine  10x10\"       Supination B/L 2x10 Std  W/ hammer 2x10 L only       Sup flex w/ opp UE 2x10  2x10       Std scaption AAROM w/ cane 2x10  2x10       Std flexion AAROM w/ cane   1x10       Std bilat ER AROM  2x10       Shldr abd iso w/ ball   1x10                                       HEP        Ther Activ                                                        NMReed        Gripper  2x10 yellow 20# 2x10 yellow 20#                                       Modalities        CP                          Access Code: [email or web address]  URL: [email or web address]  Date: 04/17/2023  Prepared by: Sofia Wilcox    Exercises  - Isometric Shoulder Flexion at Wall  - 1-2 x daily - 7 x weekly - 1 sets - 10 reps - 5 hold  - Circular Shoulder Pendulum with Table Support  - 1-2 x daily - 7 x weekly - 2 sets - 10 reps  - Seated Bilateral Shoulder Flexion Towel Slide at Table Top  - 1 x daily - 7 x weekly - 2 sets - 10 reps - 5 hold  - Standing Elbow Flexion Extension AROM  - 1 x daily - 7 x weekly - 2 sets - 10 reps  - Seated Forearm Pronation and Supination AROM  - 1 x daily - 7 x weekly - 2 sets - 10 reps  - Supine Shoulder External Rotation with Dowel  - 1 x daily - 7 x weekly - 2 sets - 10 reps - 5 hold     "

## 2023-04-28 ENCOUNTER — TELEPHONE (OUTPATIENT)
Dept: OBGYN CLINIC | Facility: HOSPITAL | Age: 68
End: 2023-04-28

## 2023-04-28 DIAGNOSIS — Z96.612 S/P REVERSE TOTAL SHOULDER ARTHROPLASTY, LEFT: Primary | ICD-10-CM

## 2023-04-28 PROBLEM — M19.012 PRIMARY OSTEOARTHRITIS OF LEFT SHOULDER: Status: RESOLVED | Noted: 2023-04-17 | Resolved: 2023-04-28

## 2023-04-28 RX ORDER — CEPHALEXIN 500 MG/1
500 CAPSULE ORAL EVERY 6 HOURS SCHEDULED
Qty: 28 CAPSULE | Refills: 0 | Status: SHIPPED | OUTPATIENT
Start: 2023-04-28 | End: 2023-05-05

## 2023-04-28 NOTE — TELEPHONE ENCOUNTER
Caller: Self    Doctor: Amanda Krause    Reason for call: Patient noticed after yesterday's therapy session, the incision is swollen, red, a little warm, no drainage, no increased pain, may have a fever    Advised patient to upload pictures to [address] [place] [address] [phone] and someone will call him back shortly    Call back#: [phone]

## 2023-04-28 NOTE — TELEPHONE ENCOUNTER
Called and spoke w/pt and he sent pictures through [facility] review  Temp 97 7  Pt is coughing a lot  He called PCP and he wanted him to take a Covid test which was negative at home X2  NO CP or SOB  He is tired all the time and just wants to sleep  Please advise

## 2023-04-28 NOTE — TELEPHONE ENCOUNTER
Pictures are blurry and minimally red from what I can see  Reviewed therapy note which does not mention any concern about incision  I can send over an antibiotic to pharmacy as precaution  Pharmacy not mentioned in task so I will send to one on file        Symptoms of extreme fatigue should dicussed extensively with PCP if they do not improve

## 2023-04-28 NOTE — TELEPHONE ENCOUNTER
Called and spoke w/pt and relayed VIKTOR Pizarro's msg to pt  Pt informed Keflex sent to [facility] that is correct  He will see how he feels after taking antibiotic  He is not concerned w/back  Will see if PT wants to see it on Monday when they come  And he will notify PCP if continues with extreme fatigue  No further questions or concerns offered when asked

## 2023-04-30 ENCOUNTER — NURSE TRIAGE (OUTPATIENT)
Dept: OTHER | Facility: OTHER | Age: 68
End: 2023-04-30

## 2023-04-30 NOTE — TELEPHONE ENCOUNTER
"Regarding: post surgery infection , bubbly and irritated  ----- Message from Jaime Hand sent at 4/30/2023  7:42 PM EDT -----  \" The area I had my procedure is bubbly and looks infected  I have uploaded a picture on [facility]  \"    "

## 2023-05-01 ENCOUNTER — TELEPHONE (OUTPATIENT)
Dept: PHYSICAL THERAPY | Facility: CLINIC | Age: 68
End: 2023-05-01

## 2023-05-01 ENCOUNTER — HOSPITAL ENCOUNTER (INPATIENT)
Facility: HOSPITAL | Age: 68
LOS: 4 days | Discharge: HOME | End: 2023-05-05
Attending: ORTHOPAEDIC SURGERY | Admitting: ORTHOPAEDIC SURGERY

## 2023-05-01 ENCOUNTER — APPOINTMENT (INPATIENT)
Dept: RADIOLOGY | Facility: HOSPITAL | Age: 68
End: 2023-05-01

## 2023-05-01 ENCOUNTER — APPOINTMENT (OUTPATIENT)
Dept: PHYSICAL THERAPY | Facility: CLINIC | Age: 68
End: 2023-05-01
Payer: MEDICARE

## 2023-05-01 ENCOUNTER — ANESTHESIA EVENT (INPATIENT)
Dept: PERIOP | Facility: HOSPITAL | Age: 68
End: 2023-05-01

## 2023-05-01 ENCOUNTER — OFFICE VISIT (OUTPATIENT)
Dept: OBGYN CLINIC | Facility: OTHER | Age: 68
End: 2023-05-01

## 2023-05-01 VITALS
BODY MASS INDEX: 35.16 KG/M2 | SYSTOLIC BLOOD PRESSURE: 119 MMHG | HEIGHT: 70 IN | DIASTOLIC BLOOD PRESSURE: 69 MMHG | WEIGHT: 245.6 LBS | HEART RATE: 78 BPM

## 2023-05-01 DIAGNOSIS — N17.9 AKI (ACUTE KIDNEY INJURY) (HCC): ICD-10-CM

## 2023-05-01 DIAGNOSIS — Z96.612 S/P REVERSE TOTAL SHOULDER ARTHROPLASTY, LEFT: ICD-10-CM

## 2023-05-01 DIAGNOSIS — L03.114 CELLULITIS OF LEFT UPPER EXTREMITY: Primary | ICD-10-CM

## 2023-05-01 DIAGNOSIS — Z47.1 AFTERCARE FOLLOWING LEFT SHOULDER JOINT REPLACEMENT SURGERY: ICD-10-CM

## 2023-05-01 DIAGNOSIS — Z96.612 AFTERCARE FOLLOWING LEFT SHOULDER JOINT REPLACEMENT SURGERY: ICD-10-CM

## 2023-05-01 LAB
ABO GROUP BLD: NORMAL
ANION GAP SERPL CALCULATED.3IONS-SCNC: 2 MMOL/L (ref 4–13)
ANION GAP SERPL CALCULATED.3IONS-SCNC: 4 MMOL/L (ref 4–13)
APTT PPP: 34 SECONDS (ref 23–37)
ATRIAL RATE: 66 BPM
BACTERIA UR QL AUTO: NORMAL /HPF
BILIRUB UR QL STRIP: NEGATIVE
BLD GP AB SCN SERPL QL: NEGATIVE
BUN SERPL-MCNC: 51 MG/DL (ref 5–25)
BUN SERPL-MCNC: 52 MG/DL (ref 5–25)
CALCIUM SERPL-MCNC: 9.6 MG/DL (ref 8.3–10.1)
CALCIUM SERPL-MCNC: 9.8 MG/DL (ref 8.3–10.1)
CHLORIDE SERPL-SCNC: 106 MMOL/L (ref 96–108)
CHLORIDE SERPL-SCNC: 107 MMOL/L (ref 96–108)
CLARITY UR: CLEAR
CO2 SERPL-SCNC: 23 MMOL/L (ref 21–32)
CO2 SERPL-SCNC: 25 MMOL/L (ref 21–32)
COLOR UR: YELLOW
CREAT SERPL-MCNC: 2.36 MG/DL (ref 0.6–1.3)
CREAT SERPL-MCNC: 2.56 MG/DL (ref 0.6–1.3)
CREAT UR-MCNC: 83.7 MG/DL
CRP SERPL QL: 52.6 MG/L
ERYTHROCYTE [DISTWIDTH] IN BLOOD BY AUTOMATED COUNT: 13.5 % (ref 11.6–15.1)
ERYTHROCYTE [SEDIMENTATION RATE] IN BLOOD: 111 MM/HOUR (ref 0–19)
GFR SERPL CREATININE-BSD FRML MDRD: 24 ML/MIN/1.73SQ M
GFR SERPL CREATININE-BSD FRML MDRD: 27 ML/MIN/1.73SQ M
GLUCOSE SERPL-MCNC: 158 MG/DL (ref 65–140)
GLUCOSE SERPL-MCNC: 186 MG/DL (ref 65–140)
GLUCOSE SERPL-MCNC: 191 MG/DL (ref 65–140)
GLUCOSE UR STRIP-MCNC: NEGATIVE MG/DL
HCT VFR BLD AUTO: 41.6 % (ref 36.5–49.3)
HGB BLD-MCNC: 13.4 G/DL (ref 12–17)
HGB UR QL STRIP.AUTO: NEGATIVE
INR PPP: 1.07 (ref 0.84–1.19)
KETONES UR STRIP-MCNC: NEGATIVE MG/DL
LEUKOCYTE ESTERASE UR QL STRIP: NEGATIVE
MCH RBC QN AUTO: 28.2 PG (ref 26.8–34.3)
MCHC RBC AUTO-ENTMCNC: 32.2 G/DL (ref 31.4–37.4)
MCV RBC AUTO: 87 FL (ref 82–98)
NITRITE UR QL STRIP: NEGATIVE
NON-SQ EPI CELLS URNS QL MICRO: NORMAL /HPF
P AXIS: 17 DEGREES
PH UR STRIP.AUTO: 5.5 [PH]
PLATELET # BLD AUTO: 130 THOUSANDS/UL (ref 149–390)
PMV BLD AUTO: 12.4 FL (ref 8.9–12.7)
POTASSIUM SERPL-SCNC: 4.2 MMOL/L (ref 3.5–5.3)
POTASSIUM SERPL-SCNC: 4.6 MMOL/L (ref 3.5–5.3)
PR INTERVAL: 170 MS
PROT UR STRIP-MCNC: NEGATIVE MG/DL
PROTHROMBIN TIME: 14.2 SECONDS (ref 11.6–14.5)
QRS AXIS: 7 DEGREES
QRSD INTERVAL: 92 MS
QT INTERVAL: 400 MS
QTC INTERVAL: 419 MS
RBC # BLD AUTO: 4.76 MILLION/UL (ref 3.88–5.62)
RBC #/AREA URNS AUTO: NORMAL /HPF
RH BLD: POSITIVE
SODIUM 24H UR-SCNC: 53 MOL/L
SODIUM SERPL-SCNC: 133 MMOL/L (ref 135–147)
SODIUM SERPL-SCNC: 134 MMOL/L (ref 135–147)
SP GR UR STRIP.AUTO: 1.01 (ref 1–1.03)
SPECIMEN EXPIRATION DATE: NORMAL
T WAVE AXIS: 60 DEGREES
UROBILINOGEN UR STRIP-ACNC: <2 MG/DL
VENTRICULAR RATE: 66 BPM
WBC # BLD AUTO: 5.95 THOUSAND/UL (ref 4.31–10.16)
WBC #/AREA URNS AUTO: NORMAL /HPF

## 2023-05-01 RX ORDER — ACETAMINOPHEN 325 MG/1
650 TABLET ORAL EVERY 4 HOURS PRN
Status: DISCONTINUED | OUTPATIENT
Start: 2023-05-01 | End: 2023-05-05 | Stop reason: HOSPADM

## 2023-05-01 RX ORDER — INSULIN GLARGINE 100 [IU]/ML
20 INJECTION, SOLUTION SUBCUTANEOUS
Status: COMPLETED | OUTPATIENT
Start: 2023-05-01 | End: 2023-05-01

## 2023-05-01 RX ORDER — DILTIAZEM HYDROCHLORIDE 180 MG/1
180 CAPSULE, COATED, EXTENDED RELEASE ORAL DAILY
Status: DISCONTINUED | OUTPATIENT
Start: 2023-05-01 | End: 2023-05-05 | Stop reason: HOSPADM

## 2023-05-01 RX ORDER — INSULIN LISPRO 100 [IU]/ML
1-5 INJECTION, SOLUTION INTRAVENOUS; SUBCUTANEOUS
Status: DISCONTINUED | OUTPATIENT
Start: 2023-05-01 | End: 2023-05-03

## 2023-05-01 RX ORDER — OXYCODONE HYDROCHLORIDE 5 MG/1
5 TABLET ORAL EVERY 4 HOURS PRN
Status: DISCONTINUED | OUTPATIENT
Start: 2023-05-01 | End: 2023-05-05 | Stop reason: HOSPADM

## 2023-05-01 RX ORDER — VANCOMYCIN HYDROCHLORIDE 1 G/200ML
1000 INJECTION, SOLUTION INTRAVENOUS EVERY 12 HOURS
Status: DISCONTINUED | OUTPATIENT
Start: 2023-05-02 | End: 2023-05-01

## 2023-05-01 RX ORDER — TRAZODONE HYDROCHLORIDE 50 MG/1
50 TABLET ORAL
Status: DISCONTINUED | OUTPATIENT
Start: 2023-05-01 | End: 2023-05-05 | Stop reason: HOSPADM

## 2023-05-01 RX ORDER — AMOXICILLIN 250 MG
1 CAPSULE ORAL
Status: DISCONTINUED | OUTPATIENT
Start: 2023-05-01 | End: 2023-05-05 | Stop reason: HOSPADM

## 2023-05-01 RX ORDER — INSULIN GLARGINE 100 [IU]/ML
40 INJECTION, SOLUTION SUBCUTANEOUS
Status: DISCONTINUED | OUTPATIENT
Start: 2023-05-02 | End: 2023-05-05 | Stop reason: HOSPADM

## 2023-05-01 RX ORDER — HYDROMORPHONE HCL IN WATER/PF 6 MG/30 ML
0.2 PATIENT CONTROLLED ANALGESIA SYRINGE INTRAVENOUS EVERY 4 HOURS PRN
Status: DISCONTINUED | OUTPATIENT
Start: 2023-05-01 | End: 2023-05-03

## 2023-05-01 RX ORDER — VANCOMYCIN HYDROCHLORIDE 1 G/200ML
1000 INJECTION, SOLUTION INTRAVENOUS AS NEEDED
Status: DISCONTINUED | OUTPATIENT
Start: 2023-05-01 | End: 2023-05-03

## 2023-05-01 RX ORDER — HYDRALAZINE HYDROCHLORIDE 25 MG/1
25 TABLET, FILM COATED ORAL EVERY 8 HOURS PRN
Status: DISCONTINUED | OUTPATIENT
Start: 2023-05-01 | End: 2023-05-05 | Stop reason: HOSPADM

## 2023-05-01 RX ORDER — SODIUM CHLORIDE, SODIUM GLUCONATE, SODIUM ACETATE, POTASSIUM CHLORIDE, MAGNESIUM CHLORIDE, SODIUM PHOSPHATE, DIBASIC, AND POTASSIUM PHOSPHATE .53; .5; .37; .037; .03; .012; .00082 G/100ML; G/100ML; G/100ML; G/100ML; G/100ML; G/100ML; G/100ML
100 INJECTION, SOLUTION INTRAVENOUS CONTINUOUS
Status: DISCONTINUED | OUTPATIENT
Start: 2023-05-01 | End: 2023-05-02

## 2023-05-01 RX ORDER — ONDANSETRON 2 MG/ML
4 INJECTION INTRAMUSCULAR; INTRAVENOUS EVERY 4 HOURS PRN
Status: DISCONTINUED | OUTPATIENT
Start: 2023-05-01 | End: 2023-05-05 | Stop reason: HOSPADM

## 2023-05-01 RX ORDER — SODIUM CHLORIDE, SODIUM LACTATE, POTASSIUM CHLORIDE, CALCIUM CHLORIDE 600; 310; 30; 20 MG/100ML; MG/100ML; MG/100ML; MG/100ML
75 INJECTION, SOLUTION INTRAVENOUS CONTINUOUS
Status: DISCONTINUED | OUTPATIENT
Start: 2023-05-02 | End: 2023-05-01

## 2023-05-01 RX ORDER — INSULIN GLARGINE 100 [IU]/ML
20 INJECTION, SOLUTION SUBCUTANEOUS
Status: DISCONTINUED | OUTPATIENT
Start: 2023-05-01 | End: 2023-05-01

## 2023-05-01 RX ORDER — SODIUM CHLORIDE 9 MG/ML
75 INJECTION, SOLUTION INTRAVENOUS CONTINUOUS
Status: DISCONTINUED | OUTPATIENT
Start: 2023-05-01 | End: 2023-05-01

## 2023-05-01 RX ADMIN — INSULIN GLARGINE 20 UNITS: 100 INJECTION, SOLUTION SUBCUTANEOUS at 21:37

## 2023-05-01 RX ADMIN — INSULIN LISPRO 1 UNITS: 100 INJECTION, SOLUTION INTRAVENOUS; SUBCUTANEOUS at 18:17

## 2023-05-01 RX ADMIN — VANCOMYCIN HYDROCHLORIDE 2000 MG: 10 INJECTION, POWDER, LYOPHILIZED, FOR SOLUTION INTRAVENOUS at 11:44

## 2023-05-01 RX ADMIN — SODIUM CHLORIDE, SODIUM GLUCONATE, SODIUM ACETATE, POTASSIUM CHLORIDE, MAGNESIUM CHLORIDE, SODIUM PHOSPHATE, DIBASIC, AND POTASSIUM PHOSPHATE 100 ML/HR: .53; .5; .37; .037; .03; .012; .00082 INJECTION, SOLUTION INTRAVENOUS at 14:29

## 2023-05-01 RX ADMIN — INSULIN LISPRO 1 UNITS: 100 INJECTION, SOLUTION INTRAVENOUS; SUBCUTANEOUS at 21:36

## 2023-05-01 RX ADMIN — TRAZODONE HYDROCHLORIDE 50 MG: 50 TABLET ORAL at 21:36

## 2023-05-01 NOTE — PROGRESS NOTES
Chelsi Handley is a 69 y o  male who is currently ordered Vancomycin IV with management by the Pharmacy Consult service  Relevant clinical data and objective / subjective history reviewed  Vancomycin Assessment:  1  Indication and Goal AUC/Trough: Soft tissue (goal -600, trough >10), -600, trough >10  2  Clinical Status: worsening  3  Micro:   N/a  4  Renal Function:  · SCr: 2 56 mg/dL  · CrCl: 34 mL/min  · Renal replacement: Not on dialysis  5  Days of Therapy: 1  Current Dose: 1 gm when level <15  Vancomycin Plan:  1  New Dosing: Estimated AUC: na mcg*hr/mL  2  Estimated Trough: na mcg/mL  3  Next Level: amdraw 5-2  4  Renal Function Monitoring: Daily MELISSA and [place] will continue to follow closely for s/sx of nephrotoxicity, infusion reactions and appropriateness of therapy  BMP and CBC will be ordered per protocol  We will continue to follow the patients culture results and clinical progress daily      Tom Pereira, Pharmacist

## 2023-05-01 NOTE — CONSULTS
Internal Medicine  Consultation Note    Patient: Madhuri Mosqueda  Age/sex: 69 y o  male  Medical Record #: [id number]    Assessment/Plan    Status Post left Total Shoulder Arthroplasty possible infection   Initial reverse TSA 4/4/2023   IV Vanco per orthopedics dose adjusted d/t ABILIO   Continue post op pain control measures as prescribed   Follow bowel regimen to help decrease narcotic induced constipation    Follow post operative hemoglobin with serial CBC and treat accordingly   Monitor WBC and fever curve post op while encouraging use of incentive spirometer   DVT prophylaxis in place and reviewed  HTN   Hold losartan/triamterene/hctz in the post operative setting to avoid hypotension/ABILIO   OK to resume on dc   Add hydralazine as needed for SBP >160   Monitor trend   Continue cardizem with appropriate hold parameters    DM II   Hgb A1c 8 2   Home meds: lantus 40 units SQ qhs/glimepiride daily   Switch to DM diet   Add sliding scale and coverage   Start lantus but will give 1/2 dose tonight in preparation for surgery   Will hold on glimepiride for now   Monitor trend    ABILIO  · Baseline creatinine 1 0-1 2  · Current 2 5  · Consult nephrology for input  · IV bolus given followed by NS @ 75cc/hr  · Will recheck bmp at 1700 and in am  · Urine studies ordered by renal  · Avoid nephrotoxic medications  · vanco to be adjusted by pharmacy  · Hold losartan    Thrombocytopenia  · Baseline platelets 69-44 range  · Currently 130  · Monitor cbc    ETOH cirrhosis  · No longer uses ETOH  · F/b GI        PRE-OP HGB LEVEL: 13 4    Subjective/ HPI: Madhuri Mosqueda was seen and examined  He has a history of initial reverse arthroplasty 4/4/2023 and had no issues initially postoperatively  He does state that Friday he started noticing increased erythema, swelling at the incision site, he did call his surgeon who started him on cephalexin    Denied any fever, chills, pain in the surgical site however he did complain of malaise, the wife who is present during the interview states he slept most of the weekend  Oral intake was less than normal over the weekend as well  This morning when he was brushing his teeth he reached over and the top of the incision opened and he had pink serous drainage  He was seen by his surgeon and was sent to the emergency room for admission and possible washout tomorrow  Noted on admission the patient did have ABILIO, creatinine at 2 5 his normal baseline is 1 0-1 2  We did explain to the patient that nephrology will be consulted as well  He is admitted and we are asked to follow along for medical management  ROS:   A 10 point ROS was performed; negative except as noted above       Social History:    Substance Use History:   Social History     Substance and Sexual Activity   Alcohol Use Not Currently    Comment: quit      Social History     Tobacco Use   Smoking Status Former    Packs/day: 0 50    Years: 20 00    Pack years: 10 00    Types: Cigarettes    Quit date: 44    Years since quittin 3   Smokeless Tobacco Never     Social History     Substance and Sexual Activity   Drug Use Never       Family History:    Family History   Problem Relation Age of Onset    Diabetes Mother     Supraventricular tachycardia Mother     COPD Father     Heart disease Father     Other Father         Sepsis; related to UTI with complicating cardiac problems    Heart disease Paternal Grandmother          Review of Scheduled Meds:  Current Facility-Administered Medications   Medication Dose Route Frequency Provider Last Rate    acetaminophen  650 mg Oral Q4H PRN Sierra Bell MD      diltiazem  180 mg Oral Daily AMOR Mirza      hydrALAZINE  25 mg Oral Q8H PRN AMOR Mirza      HYDROmorphone  0 2 mg Intravenous Q4H PRN Sierra Bell MD      insulin glargine  20 Units Subcutaneous HS AMOR Obando      insulin lispro  1-5 Units Subcutaneous TID AC VirginiaAMOR Quarles      insulin lispro  1-5 Units Subcutaneous HS AMOR Sood      multi-electrolyte  100 mL/hr Intravenous Continuous AMOR Martinez      naloxone  0 04 mg Intravenous Q1MIN PRN Jennifer Mabry MD      ondansetron  4 mg Intravenous Q4H PRN Jennifer Mabry MD      oxyCODONE  5 mg Oral Q4H PRN Jennifer Mabry MD      oxyCODONE  2 5 mg Oral Q4H PRN Jennifer Mabry MD      senna-docusate sodium  1 tablet Oral HS Jennifer Mabry MD      traZODone  50 mg Oral HS AMOR Sood      vancomycin  2,000 mg Intravenous Once Jennifer Mabry MD 2,000 mg (05/01/23 1144)   Marleta Polite [START ON 5/2/2023] vancomycin  1,000 mg Intravenous Q12H Tu Elizondo MD         Labs:     Results from last 7 days   Lab Units 05/01/23  1143   WBC Thousand/uL 5 95   HEMOGLOBIN g/dL 13 4   HEMATOCRIT % 41 6   PLATELETS Thousands/uL 130*     Results from last 7 days   Lab Units 05/01/23  1143   SODIUM mmol/L 134*   POTASSIUM mmol/L 4 6   CHLORIDE mmol/L 107   CO2 mmol/L 23   BUN mg/dL 51*   CREATININE mg/dL 2 56*   CALCIUM mg/dL 9 8         Results from last 7 days   Lab Units 05/01/23  1143   INR  1 07              Lab Results   Component Value Date    BLOODCX No Growth After 5 Days  09/13/2022    BLOODCX No Growth After 5 Days  09/13/2022    BLOODCX No Growth After 5 Days  05/17/2022    BLOODCX No Growth After 5 Days   05/17/2022    WOUNDCULT 1+ Growth of Klebsiella oxytoca (A) 09/14/2022    WOUNDCULT 1+ Growth of 09/14/2022       Input and Output Summary (last 24 hours):     No intake or output data in the 24 hours ending 05/01/23 1337    Imaging:     XR chest portable    (Results Pending)       *Labs /Radiology studiesLabs reviewed  *Medications reviewed and reconciled as needed  *Please refer to order section for additional ordered labs studies  *Case discussed with primary attending during morning huddle case rounds    Vitals:   Temp (24hrs), Av 9 °F (36 6 °C), Min:97 9 °F (36 6 °C), Max:97 9 °F (36 6 °C)    Temp:  [97 9 °F (36 6 °C)] 97 9 °F (36 6 °C)  HR:  [66-78] 66  Resp:  [18-20] 18  BP: (114-138)/(61-69) 114/61  SpO2:  [95 %-98 %] 95 %  There is no height or weight on file to calculate BMI  Physical Exam:   General Appearance: no distress, conversive  HEENT: PERRLA, conjuctiva normal; oropharynx clear; mucous membranes moist;   Neck:  Supple, no lymphadenopathy or thyromegaly  Lungs: Clear, no wheezes, rales or rhonchi noted, normal respiratory effort, no retractions, expiratory effort normal  CV: regular rate and rhythm , PMI normal   ABD: soft non tender, obese +BS x4  EXT: DP pulses intact, no lymphadenopathy, no edema ;  left shoulder dressing in place, mild erythema noted at the top portion  Skin: normal turgor, normal texture, no rash; PVD discoloration noted to b/l LE  Psych: affect normal, mood normal  Neuro: AAOx3          Invasive Devices     Peripheral Intravenous Line  Duration           Peripheral IV 23 Right;Ventral (anterior) Forearm <1 day                   Code Status: Level 1 - Full Code  Current Length of Stay: 0 day(s)    Total floor / unit time spent today 1 hour  Coordination of patient's care was performed in conjunction with primary service  Time invested included review of patient's labs, vitals, and management of their comorbidities with continued monitoring, examination of patient as well as answering patient questions, documenting her findings and creating progress note in electronic medical record,  ordering appropriate diagnostic testing  Medical decision making for the day was made by supervising physician unless otherwise noted in their attestation statement  ** Please Note: Fluency Direct voice to text software may have been used in the creation of this document   Audio transcription errors may occur**

## 2023-05-01 NOTE — PROGRESS NOTES
Assessment  Diagnoses and all orders for this visit:    Cellulitis of left shoulder incision    S/P reverse total shoulder arthroplasty, left    Discussion and Plan:    · Explained to the patient that it appears he has a superficial wound infection  He has been on Keflex since Friday without much benefit  Underlying freshly implanted reverse total shoulder arthroplasty prosthesis, I do feel that more aggressive treatment is indicated  The patient also describes some lethargy and this is giving me concerned that this is a significant issue for the patient  · He was given the options to continue antibiotics for the full course of 10 days and monitor his symptoms or go to the ED today in [place] and have them admit him, start IV antibiotics and tentatively schedule him for an I&D tomorrow (5/2/23) of this left shoulder  He wished to be aggressive and will go to the [place] ED today and be put on IV antibiotics  He understood and all questions were answered  · I have communicated the plan with the orthopedic surgery residents in the hospital, they will admit the patient to my service and we will initiate IV vancomycin given his lack of improvement on oral Keflex and make the patient n p o  at midnight in anticipation of possible I&D of the left shoulder tomorrow  I did obtain informed consent from the patient regarding irrigation and debridement of the left shoulder and that has been scanned into the chart  We will assess his response to IV antibiotic tomorrow and make a decision  · Patient will stop PT at this time  Subjective:   Patient ID: Chetan Rainey is a 69 y o  male    70 y/o male who presents today for an urgent follow up visit for his left shoulder s/p reverse total shoulder arthroplasty performed on 4/4/23  Patient reports that he noticed redness and swelling about the proximal incision on Friday and has been taking Keflex for 3 days without improvements   Today, patient reports that in the "morning he was brushing his teeth and the incision opened and a lot of drainage and fluid came out  He put a dressing overtop and there is mild drainage  He also reports \"not feeling well\" over the past few days but denies fevers or chills  No numbness or tingling  The following portions of the patient's history were reviewed and updated as appropriate: allergies, current medications, past family history, past medical history, past social history, past surgical history and problem list     Objective:  /69   Pulse 78   Ht 5' 10\" (1 778 m)   Wt 111 kg (245 lb 9 6 oz)   BMI 35 24 kg/m²       Right Shoulder Exam     Range of Motion   Forward flexion: 120     Other   Erythema: present  Scars: present (No active drainage  )  Sensation: normal  Pulse: present    Comments:  Bandage was removed and mild drainage was present             Physical Exam  Constitutional:       General: He is not in acute distress  Appearance: He is well-developed  Eyes:      Conjunctiva/sclera: Conjunctivae normal       Pupils: Pupils are equal, round, and reactive to light  Cardiovascular:      Rate and Rhythm: Normal rate and regular rhythm  Pulmonary:      Effort: Pulmonary effort is normal       Breath sounds: Normal breath sounds  Abdominal:      General: Bowel sounds are normal       Palpations: Abdomen is soft  Musculoskeletal:      Cervical back: Normal range of motion and neck supple  Skin:     General: Skin is warm and dry  Findings: No erythema or rash  Neurological:      Mental Status: He is alert and oriented to person, place, and time  Deep Tendon Reflexes: Reflexes are normal and symmetric     Psychiatric:         Behavior: Behavior normal        Scribe Attestation    I,:  Mauricio Bolton am acting as a scribe while in the presence of the attending physician :       I,:  Patric Jackson MD personally performed the services described in this documentation    as scribed in my " presence :

## 2023-05-01 NOTE — PROGRESS NOTES
Remigio Trinh is a 69 y o  male who is currently ordered Vancomycin IV with management by the Pharmacy Consult service  Relevant clinical data and objective / subjective history reviewed  Vancomycin Assessment:  Indication and Goal AUC/Trough: Soft tissue (goal -600, trough >10), -600, trough >10  Clinical Status: stable  Micro:   N/a  Renal Function:  SCr: 1 1 mg/dL  CrCl: 60 mL/min  Renal replacement: Not on dialysis  Days of Therapy: 1  Current Dose: 2 gm  Vancomycin Plan:  New Dosin gm q12  Estimated AUC: 485 mcg*hr/mL  Estimated Trough: 16 4 mcg/mL  Next Level: amdraw 5-3  Renal Function Monitoring: Daily BMP and UOP  Pharmacy will continue to follow closely for s/sx of nephrotoxicity, infusion reactions and appropriateness of therapy  BMP and CBC will be ordered per protocol  We will continue to follow the patients culture results and clinical progress daily      Laura Israel, Pharmacist

## 2023-05-01 NOTE — TELEPHONE ENCOUNTER
"had shoulder arthroplasty on 4/4 with Dr Conner Persons  Was prescribed Keflex on Friday due to swelling and redness on the surgical site  Patient noted despite the Keflex incision is looking worse  Redness and swelling has increased, now seems \"bubbly\"  Denies warmth at the site, also denies fever, chills or other symptoms  Can he just f/u with the office in the morning?      On call provider notified  "

## 2023-05-01 NOTE — TELEPHONE ENCOUNTER
"  Reason for Disposition   [1] Caller has URGENT question AND [2] triager unable to answer question    Answer Assessment - Initial Assessment Questions  1  SYMPTOM: \"What's the main symptom you're concerned about? \" (e g , pain, fever, vomiting)      Redness, swelling at incision site  2  ONSET: \"When did redness  start? \"      Ongoing since Thursday  3  SURGERY: \"What surgery was performed? \"      Shoulder arthroplasty  4  DATE of SURGERY: \"When was surgery performed? \"       4/4  7  FEVER: \"Do you have a fever? \" If Yes, ask: \"What is your temperature, how was it measured, and when did it start? \"      denies  8  VOMITING: \"Is there any vomiting? \" If yes, ask: \"How many times? \"      denies  9  BLEEDING: \"Is there any bleeding? \" If Yes, ask: \"How much? \" and \"Where? \"      denies    Protocols used: POST-OP SYMPTOMS AND QUESTIONS-ADULT-    "

## 2023-05-01 NOTE — CONSULTS
Consultation - Nephrology   Nicolás Sanon 69 y o  male MRN: [id number]  Unit/Bed#: [facility] 622-01 Encounter: [id number]    ASSESSMENT and PLAN:  Acute kidney injury (POA):  Etiology: Suspect multi- factorial:  prerenal azotemia in the setting of decreased oral intake, loss of autoregulatory system in the setting of losartan;  possible infection on Keflex-need to rule out AIN  Assessment:   After review medical records through [facility] and [facility] everywhere baseline creatinine 0 9-1 2 dating back to 2020   Admission creatinine 2 56   Losartan placed on hold   Recently started on Keflex on Friday  Plan:   Avoid hypotension or perturbations of blood pressure to prevent decreased renal perfusion   Avoid nephrotoxins, NSAIDs, IV contrast if possible   We will check urinalysis with micro, urine eos to rule out AIN   We will check urine creatinine/urine sodium evaluate for ATN   Trend BMP daily   Will change to IV Isolyte at 100 cc an hour   Discussed above recommendations with medical team-which Dr Leigh Ann Kwan in agreement   Adjust meds to appropriate GFR   Optimize hemodynamic status to avoid delay in renal recovery    Blood pressure/Hypertension:  Assessment and Plan:   Current blood pressure normotensive   Home medications: Diltiazem CD1 80 mg daily, losartan 100 mg daily,   Avoid losartan at this time when ABILIO   Maximize hemodynamics to maintain MAP >65   Avoid hypotension or fluctuations in blood pressure   Will continue to trend    Concern for shoulder infection  Assessment and Plan:  · Orthopedic following  · Plan surgical intervention I & D tomorrow  · Previously on oral Keflex-now off  · Now on IV vancomycin  · 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 who presents to the emergency room at shoulder arthroplasty 4/4/2023      HISTORY OF PRESENT ILLNESS:  Requesting Physician: Lizbet Sher,*  Reason for Consult: Cute kidney injury    Nicolás Sanon is a 69 y o  male with past medical history of hypertension, diabetes, obesity, MEG on CPAP, recent reverse total left shoulder arthroplasty on 4/4/2023 who presents with concerns for wound infection  Work up revealed elevated creatinine and a  renal consultation is requested today for assistance in the management of acute kidney injury  Of note he was started on Keflex on Friday concerns for cellulitis and now with worsening symptoms planned I&D tomorrow with orthopedics  PAST MEDICAL HISTORY:  Past Medical History:   Diagnosis Date    Arrhythmia     CPAP (continuous positive airway pressure) dependence     Diabetes mellitus ([address] )     History of echocardiogram 11/14/2017    showed EF of 50-55 percentWith moderate LVH and left ventricle diastolic dysfunction  Left atrium was moderately enlarged  Trace MR noted   History of Holter monitoring 11/21/2017    showed baseline rhythm of sinus origin with an average heart it of 61 bpm  The lowest heart rate was 49 and the highest heart rate was 10 8 bpm  There were rare single VPCs, and frequent PACs representing 3 2% of total beats  There were several episodes of sinus arrhythmias with sinus bradycardia and heart rate ranging from 40-90 bpm  No sustained dysrhythmias, or pauses noted   The patient did not    Hypertension     Liver disease     Obese     Sleep apnea        PAST SURGICAL HISTORY:  Past Surgical History:   Procedure Laterality Date    CATARACT EXTRACTION      EYE SURGERY Left 1997    HERNIA REPAIR  [id number]    KNEE ARTHROPLASTY Right 2008    [place] ARTHROPLASTY GLENOHUMERAL JOINT TOTAL SHOULDER Left 4/4/2023    Procedure: ARTHROPLASTY SHOULDER REVERSE;  Surgeon: Lionel oDan MD;  [facility]: [facility];  Service: Orthopedics    SHOULDER SURGERY Right 2002       ALLERGIES:  Allergies   Allergen Reactions    Sulfa Antibiotics Hives       SOCIAL HISTORY:  Social History     Substance and Sexual Activity   Alcohol Use Not Currently    Comment: quit 12/24 Social History     Substance and Sexual Activity   Drug Use Never     Social History     Tobacco Use   Smoking Status Former    Packs/day: 0 50    Years: 20 00    Pack years: 10 00    Types: Cigarettes    Quit date: Sha Conroy Years since quittin 3   Smokeless Tobacco Never       FAMILY HISTORY:  Family History   Problem Relation Age of Onset    Diabetes Mother     Supraventricular tachycardia Mother     COPD Father     Heart disease Father     Other Father         Sepsis; related to UTI with complicating cardiac problems    Heart disease Paternal Grandmother        MEDICATIONS:    Current Facility-Administered Medications:     acetaminophen (TYLENOL) tablet 650 mg, 650 mg, Oral, Q4H PRN, Sunita Velarde MD    HYDROmorphone HCl (DILAUDID) injection 0 2 mg, 0 2 mg, Intravenous, Q4H PRN, Sunita Velarde MD    [START ON 2023] lactated ringers infusion, 75 mL/hr, Intravenous, Continuous, Sunita Velarde MD    [facility]) 0 04 mg/mL syringe 0 04 mg, 0 04 mg, Intravenous, Q1MIN PRN, Sunita Velarde MD    ondansetron [facility]) injection 4 mg, 4 mg, Intravenous, Q4H PRN, Sunita Velarde MD    oxyCODONE (ROXICODONE) IR tablet 5 mg, 5 mg, Oral, Q4H PRN, Sunita Velarde MD    oxyCODONE (ROXICODONE) split tablet 2 5 mg, 2 5 mg, Oral, Q4H PRN, Sunita Velarde MD    senna-docusate sodium (SENOKOT S) 8 6-50 mg per tablet 1 tablet, 1 tablet, Oral, HS, Sunita Velarde MD    sodium chloride 0 9 % infusion, 75 mL/hr, Intravenous, Continuous, AMOR Obando    vancomycin (VANCOCIN) 2,000 mg in sodium chloride 0 9 % 500 mL IVPB, 2,000 mg, Intravenous, Once, Sunita Velarde MD, Last Rate: 250 mL/hr at 23 1144, 2,000 mg at 23 1144    [START ON 2023] vancomycin (VANCOCIN) IVPB (premix in dextrose) 1,000 mg 200 mL, 1,000 mg, Intravenous, Q12H, Gary Ford MD    REVIEW OF SYSTEMS:  Patient seen and examined at bedside  Review of Systems   Constitutional: Positive for fatigue  HENT: Negative  Eyes: Negative  Respiratory: Negative  Cardiovascular: Negative  Gastrointestinal: Negative  Endocrine: Negative  Genitourinary: Negative  Musculoskeletal: Positive for joint swelling  Skin: Positive for wound  Left shoulder   Allergic/Immunologic: Negative  Neurological: Negative  Hematological: Negative  Psychiatric/Behavioral: Negative  PHYSICAL EXAM:  Current Weight:    First Weight:    Vitals:    05/01/23 1100 05/01/23 1225   BP: 138/63 114/61   BP Location: Right arm    Pulse: 71 66   Resp: 20 18   Temp: 97 9 °F (36 6 °C) 97 9 °F (36 6 °C)   TempSrc: Oral    SpO2: 98% 95%     No intake or output data in the 24 hours ending 05/01/23 1314    Physical Exam  Vitals and nursing note reviewed  Constitutional:       Appearance: Normal appearance  Comments: NAD, OJHONAHTAN   HENT:      Head: Normocephalic and atraumatic  Nose: Nose normal       Mouth/Throat:      Mouth: Mucous membranes are moist       Pharynx: Oropharynx is clear  Eyes:      Extraocular Movements: Extraocular movements intact  Conjunctiva/sclera: Conjunctivae normal    Cardiovascular:      Rate and Rhythm: Normal rate and regular rhythm  Pulses: Normal pulses  Heart sounds: Normal heart sounds  Pulmonary:      Effort: Pulmonary effort is normal       Breath sounds: Normal breath sounds  Abdominal:      General: Bowel sounds are normal       Palpations: Abdomen is soft  Musculoskeletal:      Cervical back: Normal range of motion and neck supple  Comments: No significant lower extremity edema  PVD discoloration bilaterally   Skin:     General: Skin is warm and dry  Comments: Left shoulder dressing intact   Neurological:      General: No focal deficit present  Mental Status: He is alert and oriented to person, place, and time     Psychiatric:         Mood and Affect: Mood normal          Behavior: Behavior normal          Thought Content:  Thought content normal          Judgment: Judgment normal             Lab Results:   Results from last 7 days   Lab Units 05/01/23  1143   WBC Thousand/uL 5 95   HEMOGLOBIN g/dL 13 4   HEMATOCRIT % 41 6   PLATELETS Thousands/uL 130*   SODIUM mmol/L 134*   POTASSIUM mmol/L 4 6   CHLORIDE mmol/L 107   CO2 mmol/L 23   BUN mg/dL 51*   CREATININE mg/dL 2 56*   CALCIUM mg/dL 9 8

## 2023-05-01 NOTE — CASE MANAGEMENT
Case Management Assessment & Discharge Planning Note    Patient name Rhoda Reese  [facility] [address] [facility] [id number]  : 1955 Date 2023       Current Admission Date: 2023  Current Admission Diagnosis:Encounter for wound re-check, Aftercare following left shoulder joint replacement surgery   Patient Active Problem List    Diagnosis Date Noted    Aftercare following left shoulder joint replacement surgery 2023    S/P reverse total shoulder arthroplasty, left 2023    Obesity, morbid ([facility] [address] [address] ) 2022    Post-traumatic osteoarthritis of left shoulder 2022    Body mass index (BMI) of 36 0-36 9 in adult 2022    Cellulitis 2022    Diabetic ulcer of left foot associated with type 2 diabetes mellitus ([facility] [address] ) 2022    Leukopenia 2022    Insomnia 2020    Elevated troponin 2020    Chest pressure 2020    Alcohol abuse 2020    Type 2 diabetes mellitus ([facility] [address] [facility] ) 2020    Benign essential HTN 2020    ABILIO (acute kidney injury) ([facility] [address] [facility] ) 2020    Cholelithiasis     Alcoholic cirrhosis ([facility] [address] )     MEG (obstructive sleep apnea) 2020    Anemia 2020    Thrombocytopenia ([facility] ) 2020      LOS (days): 0  Geometric Mean LOS (GMLOS) (days): 3 40  Days to GMLOS:3 2     OBJECTIVE:    Risk of Unplanned Readmission Score: 12 91         Current admission status: Inpatient       Preferred Pharmacy:   [address]  [address] [facility] [address]  Phone: [phone] Fax: [phone]    Primary Care Provider: Pako Triplett MD    Primary Insurance: MEDICARE  Secondary Insurance: [place] Rubio    ASSESSMENT:  [address] [facility], [address]   Primary Phone: [phone] (Mobile)  Home Phone: [phone]               Advance Directives  Does patient have a [address] [place] [address] Avenue?: Yes  Does patient have Advance Directives?: Yes  Advance Directives: Power of  for health care  Primary Contact: Kylie Avendano-spouse         Readmission Root Cause  30 Day Readmission: No    Patient Information  Admitted from[de-identified] Home  Mental Status: Alert  During Assessment patient was accompanied by: Spouse  Assessment information provided by[de-identified] Spouse  Primary Caregiver: Self  Support Systems: Spouse/significant other  [place] of Residence: [address] [place] [address] do you live in?: [address]  entry access options  Select all that apply : Stairs  Number of steps to enter home  : 1  Do the steps have railings?: Yes  Type of Current Residence: [address] [place] [address]  In the last 12 months, was there a time when you were not able to pay the mortgage or rent on time?: No  In the last 12 months, how many places have you lived?: 1  In the last 12 months, was there a time when you did not have a steady place to sleep or slept in a shelter (including now)?: No  Living Arrangements: Lives w/ Spouse/significant other  Is patient a ?: No    Activities of Daily Living Prior to Admission  Functional Status: Independent  Completes ADLs independently?: Yes  Ambulates independently?: Yes  Does patient use assisted devices?: No  Does patient currently own DME?: Yes  What DME does the patient currently own?: [employer]  Does patient have a history of Outpatient Therapy (PT/OT)?: Yes ([facility] for shoulder currently)  Does the patient have a history of Short-Term Rehab?: No  Does patient have a history of HHC?: No  Does patient currently have [address]?: No         Patient Information Continued  Income Source: Employed  Does patient have prescription coverage?: Yes (Zack)  Within the past 12 months, you worried that your food would run out before you got the money to buy more : Never true  Within the past 12 months, the food you bought just didn't last and you didn't have money to get more : Never true  Food insecurity resource given?: N/A  Does patient receive dialysis treatments?: No  Does patient have a history of substance abuse?: No  Does patient have a history of Mental Health Diagnosis?: No         Means of Transportation  Means of Transport to Appts[de-identified] Drives Self  In the past 12 months, has lack of transportation kept you from medical appointments or from getting medications?: No  In the past 12 months, has lack of transportation kept you from meetings, work, or from getting things needed for daily living?: No  Was application for public transport provided?: N/A        DISCHARGE DETAILS:    Discharge planning discussed with[de-identified] patient's spouse-Kaylah  Freedom of Choice: Yes  Comments - Freedom of Choice: pending recs  CM contacted family/caregiver?: Yes  Were Treatment Team discharge recommendations reviewed with patient/caregiver?: Yes  Did patient/caregiver verbalize understanding of patient care needs?: Yes       Contacts  Patient Contacts: Jazmin Kowalski  Contact Method: Phone  Phone Number: [phone]  Reason/Outcome: Continuity of Care, Emergency Contact

## 2023-05-01 NOTE — H&P
"H & P: Orthopedics   Bonifacio Philip 69 y o  male MRN: [id number]  Unit/Bed#: ED 23      Chief Complaint:   left shoulder superficial surgical site infection    HPI:  69 y o male right hand dominant complaining of left shoulder pain, erythema, and swelling over left rTSA surgical site infection (4/4/23)  Per patient, he called the office originally on 4/28 and was started on oral keflex  Last night, patient reports the site was \"hard as a rock\" and this morning the patient noticed copious sanguinous/purulent drainage from the superior most aspect of the incision while he was brushing his teeth  He was seen for an urgent clinic appointment with Dr Carolyn Mathews, whereby he was told to present to [place] ED for IV antibiotics  Atraumatic  Not on Blood thinners  Pain is dull in character, Located anterior aspect of left shoulder joint at prior surgical incision site, acute in onset, constant in duration, moderate in intensity  Exacerbating factors palpation, remitting factors rest  Nonradiating, no numbness, no tingling, no open wounds noted on my exam though minimal yellow drainage present on soft dressing loosely applied to site  No other complaints at this time  The pain is not associated with constitutional symptoms  The patient is not awoken at night by the pain  PMH significant for DM, HTN, obesity, and MEG requiring CPAP  Occupation retired  Review Of Systems:   · Skin: See HPI  · Neuro: See HPI  · Musculoskeletal: See HPI  · 14 point review of systems negative except as stated above     Past Medical History:   Past Medical History:   Diagnosis Date    Arrhythmia     CPAP (continuous positive airway pressure) dependence     Diabetes mellitus ([facility] [address] )     History of echocardiogram 11/14/2017    showed EF of 50-55 percentWith moderate LVH and left ventricle diastolic dysfunction  Left atrium was moderately enlarged  Trace MR noted       History of Holter monitoring 11/21/2017    showed baseline rhythm of sinus origin " with an average heart it of 61 bpm  The lowest heart rate was 49 and the highest heart rate was 10 8 bpm  There were rare single VPCs, and frequent PACs representing 3 2% of total beats  There were several episodes of sinus arrhythmias with sinus bradycardia and heart rate ranging from 40-90 bpm  No sustained dysrhythmias, or pauses noted   The patient did not    Hypertension     Liver disease     Obese     Sleep apnea        Past Surgical History:   Past Surgical History:   Procedure Laterality Date    CATARACT EXTRACTION      EYE SURGERY Left     HERNIA REPAIR  [id number]    KNEE ARTHROPLASTY Right     MA ARTHROPLASTY GLENOHUMERAL JOINT TOTAL SHOULDER Left 2023    Procedure: ARTHROPLASTY SHOULDER REVERSE;  Surgeon: Kevin Awad MD;  [facility]: [facility];  Service: Orthopedics    SHOULDER SURGERY Right        Family History:  Family history reviewed and non-contributory  Family History   Problem Relation Age of Onset    Diabetes Mother     Supraventricular tachycardia Mother     COPD Father     Heart disease Father     Other Father         Sepsis; related to UTI with complicating cardiac problems    Heart disease Paternal Grandmother        Social History:  Social History     Socioeconomic History    Marital status: /Civil Union     Spouse name: None    Number of children: 2    Years of education: 12    Highest education level: Some college, no degree   Occupational History    None   Tobacco Use    Smoking status: Former     Packs/day: 0 50     Years: 20 00     Pack years: 10 00     Types: Cigarettes     Quit date:      Years since quittin 3    Smokeless tobacco: Never   Vaping Use    Vaping Use: Never used   Substance and Sexual Activity    Alcohol use: Not Currently     Comment: quit     Drug use: Never    Sexual activity: Not Currently   Other Topics Concern    None   Social History Narrative    · Most recent tobacco use screening: 03-      · Do you currently or have you served in [employer]:   No      · Live alone or with others:   with others      · High blood pressure: Yes      · Exercise level:   Occasional      · Overweight:   Yes      · Obese: Yes      · Diabetes:   Yes      Social Determinants of Health     Financial Resource Strain: Not on file   Food Insecurity: Not on file   Transportation Needs: Not on file   Physical Activity: Not on file   Stress: Not on file   Social Connections: Not on file   Intimate Partner Violence: Not on file   Housing Stability: Not on file       Allergies:    Allergies   Allergen Reactions    Sulfa Antibiotics Hives           Labs:  0   Lab Value Date/Time    HCT 34 6 (L) 04/05/2023 0617    HCT 40 4 04/04/2023 0853    HCT 39 5 03/20/2023 0809    HGB 11 2 (L) 04/05/2023 0617    HGB 13 1 04/04/2023 0853    HGB 12 4 03/20/2023 0809    INR 1 25 (H) 01/03/2021 0709    WBC 5 17 04/05/2023 0617    WBC 3 91 (L) 04/04/2023 0853    WBC 3 41 (L) 03/20/2023 0809    ESR 21 (H) 09/13/2022 2202    CRP 12 6 (H) 09/15/2022 0424       Meds:    Current Facility-Administered Medications:     acetaminophen (TYLENOL) tablet 650 mg, 650 mg, Oral, Q4H PRN, Colleen Devlin MD    HYDROmorphone HCl (DILAUDID) injection 0 2 mg, 0 2 mg, Intravenous, Q4H PRN, Colleen Devlin MD    [START ON 5/2/2023] lactated ringers infusion, 75 mL/hr, Intravenous, Continuous, Colleen Devlin MD    naloxone [facility]) 0 04 mg/mL syringe 0 04 mg, 0 04 mg, Intravenous, Q1MIN PRN, Colleen Devlin MD    ondansetron [facility]) injection 4 mg, 4 mg, Intravenous, Q4H PRN, Colleen Devlin MD    oxyCODONE (ROXICODONE) IR tablet 5 mg, 5 mg, Oral, Q4H PRN, Cloleen Devlin MD    oxyCODONE (ROXICODONE) split tablet 2 5 mg, 2 5 mg, Oral, Q4H PRN, Colleen Devlin MD    senna-docusate sodium (SENOKOT S) 8 6-50 mg per tablet 1 tablet, 1 tablet, Oral, HS, Colleen Devlin MD    vancomycin (VANCOCIN) 2,000 mg in sodium chloride 0 9 % 500 mL IVPB, 2,000 mg, Intravenous, Once, Hu Salcedo MD    Current Outpatient Medications:     cephalexin (KEFLEX) 500 mg capsule, Take 1 capsule (500 mg total) by mouth every 6 (six) hours for 7 days, Disp: 28 capsule, Rfl: 0    diltiazem (CARDIZEM CD) 180 mg 24 hr capsule, , Disp: , Rfl:     glimepiride (AMARYL) 4 mg tablet, , Disp: , Rfl:     Insulin Glargine Solostar (Lantus SoloStar) 100 UNIT/ML SOPN, Inject 0 4 mL (40 Units total) under the skin daily at bedtime, Disp: , Rfl:     Insulin Pen Needle (BD Pen Needle Nayla 2nd Gen) 32G X 4 MM MISC, For use with insulin pen  Pharmacy may dispense brand covered by insurance , Disp: 100 each, Rfl: 0    losartan (COZAAR) 100 MG tablet, , Disp: , Rfl:     Multiple Vitamin (multivitamin) capsule, Take 1 capsule by mouth daily, Disp: 21 capsule, Rfl: 0    traZODone (DESYREL) 50 mg tablet, bedtime, Disp: , Rfl:     triamterene-hydrochlorothiazide (DYAZIDE) 37 5-25 mg per capsule, , Disp: , Rfl:     Blood Culture:   Lab Results   Component Value Date    BLOODCX No Growth After 5 Days  09/13/2022    BLOODCX No Growth After 5 Days  09/13/2022       Wound Culture:   Lab Results   Component Value Date    WOUNDCULT 1+ Growth of Klebsiella oxytoca (A) 09/14/2022    WOUNDCULT 1+ Growth of 09/14/2022       Ins and Outs:  No intake/output data recorded            Physical Exam:   /63 (BP Location: Right arm)   Pulse 71   Temp 97 9 °F (36 6 °C) (Oral)   Resp 20   SpO2 98%   Gen: No acute distress, resting comfortably in bed  HEENT: Eyes clear, moist mucus membranes, hearing intact  Respiratory: No audible wheezing or stridor  Cardiovascular: Well Perfused peripherally, 2+ distal pulse  Abdomen: nondistended, no peritoneal signs  Musculoskeletal: left upper extremity  · Skin pink dry and intact with swelling, erythema, induration and calor at site of prior left rTSA  · Some fluctuance appreciated at prior incision site, no active drainage nor could any fluid be expressed from the site  · Soft dressing in place with scant yellow drainage  · Sensation intact to axillary, musculocutaneous, radial, ulna, median nerves  · Motor intact to axillary, musculocutaneous, radial, ulna, median nerves  · 2+ radial and ulnar pulse  · Musculature is soft and compressible, no pain with passive stretch      Assessment:  67 y o male with  left shoulder surgical site infection  Plan:   · NWB LUE   · IV vanco w pharmacy consult  · Pending AM exam following vanco, may elect to undergo washout of surgical site - maintain NPO at midnight and hold AM AC/AP  · Appreciate medicine co management   · There is no height or weight on file to calculate BMI     · Dispo: Ortho will follow      Suzie Richmond MD

## 2023-05-01 NOTE — TELEPHONE ENCOUNTER
Patient called to cancel today's appt due to infection of his incision which is currently being treated w/ antibiotics  Surgeon is aware  Confirmed next appt on 5/4/23

## 2023-05-01 NOTE — PLAN OF CARE
Problem: PAIN - ADULT  Goal: Verbalizes/displays adequate comfort level or baseline comfort level  Description: Interventions:  - Encourage patient to monitor pain and request assistance  - Assess pain using appropriate pain scale  - Administer analgesics based on type and severity of pain and evaluate response  - Implement non-pharmacological measures as appropriate and evaluate response  - Consider cultural and social influences on pain and pain management  - Notify physician/advanced practitioner if interventions unsuccessful or patient reports new pain  Outcome: Progressing     Problem: INFECTION - ADULT  Goal: Absence or prevention of progression during hospitalization  Description: INTERVENTIONS:  - Assess and monitor for signs and symptoms of infection  - Monitor lab/diagnostic results  - Monitor all insertion sites, i e  indwelling lines, tubes, and drains  - Monitor endotracheal if appropriate and nasal secretions for changes in amount and color  - [place] appropriate cooling/warming therapies per order  - Administer medications as ordered  - Instruct and encourage patient and family to use good hand hygiene technique  - Identify and instruct in appropriate isolation precautions for identified infection/condition  Outcome: Progressing  Goal: Absence of fever/infection during neutropenic period  Description: INTERVENTIONS:  - Monitor WBC    Outcome: Progressing     Problem: SAFETY ADULT  Goal: Patient will remain free of falls  Description: INTERVENTIONS:  - Educate patient/family on patient safety including physical limitations  - Instruct patient to call for assistance with activity   - Consult OT/PT to assist with strengthening/mobility   - Keep Call bell within reach  - Keep bed low and locked with side rails adjusted as appropriate  - Keep care items and personal belongings within reach  - Initiate and maintain comfort rounds  - Make Fall Risk Sign visible to staff  - Offer Toileting every  Hours, in advance of need  - Initiate/Maintain alarm  - Obtain necessary fall risk management equipment:   - Apply yellow socks and bracelet for high fall risk patients  - Consider moving patient to room near nurses station  Outcome: Progressing  Goal: Maintain or return to baseline ADL function  Description: INTERVENTIONS:  -  Assess patient's ability to carry out ADLs; assess patient's baseline for ADL function and identify physical deficits which impact ability to perform ADLs (bathing, care of mouth/teeth, toileting, grooming, dressing, etc )  - Assess/evaluate cause of self-care deficits   - Assess range of motion  - Assess patient's mobility; develop plan if impaired  - Assess patient's need for assistive devices and provide as appropriate  - Encourage maximum independence but intervene and supervise when necessary  - Involve family in performance of ADLs  - Assess for home care needs following discharge   - Consider OT consult to assist with ADL evaluation and planning for discharge  - Provide patient education as appropriate  Outcome: Progressing  Goal: Maintains/Returns to pre admission functional level  Description: INTERVENTIONS:  - Perform BMAT or MOVE assessment daily    - Set and communicate daily mobility goal to care team and patient/family/caregiver  - Collaborate with rehabilitation services on mobility goals if consulted  - Perform Range of Motion  times a day  - Reposition patient every  hours    - Dangle patient  times a day  - Stand patient  times a day  - Ambulate patient  times a day  - Out of bed to chair  times a day   - Out of bed for meals times a day  - Out of bed for toileting  - Record patient progress and toleration of activity level   Outcome: Progressing     Problem: DISCHARGE PLANNING  Goal: Discharge to home or other facility with appropriate resources  Description: INTERVENTIONS:  - Identify barriers to discharge w/patient and caregiver  - Arrange for needed discharge resources and transportation as appropriate  - Identify discharge learning needs (meds, wound care, etc )  - Arrange for interpretive services to assist at discharge as needed  - Refer to Case Management Department for coordinating discharge planning if the patient needs post-hospital services based on physician/advanced practitioner order or complex needs related to functional status, cognitive ability, or social support system  Outcome: Progressing     Problem: Knowledge Deficit  Goal: Patient/family/caregiver demonstrates understanding of disease process, treatment plan, medications, and discharge instructions  Description: Complete learning assessment and assess knowledge base    Interventions:  - Provide teaching at level of understanding  - Provide teaching via preferred learning methods  Outcome: Progressing

## 2023-05-01 NOTE — TELEPHONE ENCOUNTER
Per on call, patient can continue to monitor symptoms at home  Call office in the morning for appt  Patient was notified and verbalized understanding

## 2023-05-02 ENCOUNTER — TELEPHONE (OUTPATIENT)
Dept: PHYSICAL THERAPY | Facility: CLINIC | Age: 68
End: 2023-05-02

## 2023-05-02 ENCOUNTER — ANESTHESIA (INPATIENT)
Dept: PERIOP | Facility: HOSPITAL | Age: 68
End: 2023-05-02

## 2023-05-02 LAB
ANION GAP SERPL CALCULATED.3IONS-SCNC: 6 MMOL/L (ref 4–13)
BUN SERPL-MCNC: 44 MG/DL (ref 5–25)
CALCIUM SERPL-MCNC: 9.5 MG/DL (ref 8.3–10.1)
CHLORIDE SERPL-SCNC: 107 MMOL/L (ref 96–108)
CO2 SERPL-SCNC: 23 MMOL/L (ref 21–32)
CREAT SERPL-MCNC: 1.98 MG/DL (ref 0.6–1.3)
EOSINOPHIL NFR URNS MANUAL: 0 %
GFR SERPL CREATININE-BSD FRML MDRD: 33 ML/MIN/1.73SQ M
GLUCOSE SERPL-MCNC: 103 MG/DL (ref 65–140)
GLUCOSE SERPL-MCNC: 106 MG/DL (ref 65–140)
GLUCOSE SERPL-MCNC: 108 MG/DL (ref 65–140)
GLUCOSE SERPL-MCNC: 191 MG/DL (ref 65–140)
GLUCOSE SERPL-MCNC: 422 MG/DL (ref 65–140)
GLUCOSE SERPL-MCNC: 89 MG/DL (ref 65–140)
POTASSIUM SERPL-SCNC: 4.3 MMOL/L (ref 3.5–5.3)
SODIUM SERPL-SCNC: 136 MMOL/L (ref 135–147)
VANCOMYCIN SERPL-MCNC: 16.8 UG/ML (ref 10–20)

## 2023-05-02 PROCEDURE — 0JBF0ZZ EXCISION OF LEFT UPPER ARM SUBCUTANEOUS TISSUE AND FASCIA, OPEN APPROACH: ICD-10-PCS | Performed by: ORTHOPAEDIC SURGERY

## 2023-05-02 RX ORDER — PROPOFOL 10 MG/ML
INJECTION, EMULSION INTRAVENOUS AS NEEDED
Status: DISCONTINUED | OUTPATIENT
Start: 2023-05-02 | End: 2023-05-02

## 2023-05-02 RX ORDER — SODIUM CHLORIDE, SODIUM LACTATE, POTASSIUM CHLORIDE, CALCIUM CHLORIDE 600; 310; 30; 20 MG/100ML; MG/100ML; MG/100ML; MG/100ML
100 INJECTION, SOLUTION INTRAVENOUS CONTINUOUS
Status: DISCONTINUED | OUTPATIENT
Start: 2023-05-02 | End: 2023-05-02

## 2023-05-02 RX ORDER — VANCOMYCIN HYDROCHLORIDE 1 G/200ML
INJECTION, SOLUTION INTRAVENOUS AS NEEDED
Status: DISCONTINUED | OUTPATIENT
Start: 2023-05-02 | End: 2023-05-02

## 2023-05-02 RX ORDER — ROCURONIUM BROMIDE 10 MG/ML
INJECTION, SOLUTION INTRAVENOUS AS NEEDED
Status: DISCONTINUED | OUTPATIENT
Start: 2023-05-02 | End: 2023-05-02

## 2023-05-02 RX ORDER — SODIUM CHLORIDE, SODIUM LACTATE, POTASSIUM CHLORIDE, CALCIUM CHLORIDE 600; 310; 30; 20 MG/100ML; MG/100ML; MG/100ML; MG/100ML
INJECTION, SOLUTION INTRAVENOUS CONTINUOUS PRN
Status: DISCONTINUED | OUTPATIENT
Start: 2023-05-02 | End: 2023-05-02

## 2023-05-02 RX ORDER — BUPIVACAINE HYDROCHLORIDE 5 MG/ML
INJECTION, SOLUTION EPIDURAL; INTRACAUDAL AS NEEDED
Status: DISCONTINUED | OUTPATIENT
Start: 2023-05-02 | End: 2023-05-02

## 2023-05-02 RX ORDER — ONDANSETRON 2 MG/ML
4 INJECTION INTRAMUSCULAR; INTRAVENOUS ONCE AS NEEDED
Status: DISCONTINUED | OUTPATIENT
Start: 2023-05-02 | End: 2023-05-02 | Stop reason: HOSPADM

## 2023-05-02 RX ORDER — DEXAMETHASONE SODIUM PHOSPHATE 4 MG/ML
INJECTION, SOLUTION INTRA-ARTICULAR; INTRALESIONAL; INTRAMUSCULAR; INTRAVENOUS; SOFT TISSUE AS NEEDED
Status: DISCONTINUED | OUTPATIENT
Start: 2023-05-02 | End: 2023-05-02

## 2023-05-02 RX ORDER — MAGNESIUM HYDROXIDE 1200 MG/15ML
LIQUID ORAL AS NEEDED
Status: DISCONTINUED | OUTPATIENT
Start: 2023-05-02 | End: 2023-05-02 | Stop reason: HOSPADM

## 2023-05-02 RX ORDER — FENTANYL CITRATE 50 UG/ML
INJECTION, SOLUTION INTRAMUSCULAR; INTRAVENOUS AS NEEDED
Status: DISCONTINUED | OUTPATIENT
Start: 2023-05-02 | End: 2023-05-02

## 2023-05-02 RX ORDER — HYDROMORPHONE HCL IN WATER/PF 6 MG/30 ML
0.2 PATIENT CONTROLLED ANALGESIA SYRINGE INTRAVENOUS
Status: DISCONTINUED | OUTPATIENT
Start: 2023-05-02 | End: 2023-05-02 | Stop reason: HOSPADM

## 2023-05-02 RX ORDER — MIDAZOLAM HYDROCHLORIDE 2 MG/2ML
INJECTION, SOLUTION INTRAMUSCULAR; INTRAVENOUS AS NEEDED
Status: DISCONTINUED | OUTPATIENT
Start: 2023-05-02 | End: 2023-05-02

## 2023-05-02 RX ORDER — VANCOMYCIN HYDROCHLORIDE 1 G/200ML
1000 INJECTION, SOLUTION INTRAVENOUS EVERY 24 HOURS
Status: DISCONTINUED | OUTPATIENT
Start: 2023-05-02 | End: 2023-05-03

## 2023-05-02 RX ORDER — ONDANSETRON 2 MG/ML
INJECTION INTRAMUSCULAR; INTRAVENOUS AS NEEDED
Status: DISCONTINUED | OUTPATIENT
Start: 2023-05-02 | End: 2023-05-02

## 2023-05-02 RX ORDER — OXYCODONE HYDROCHLORIDE 5 MG/1
TABLET ORAL
Qty: 13 TABLET | Refills: 0 | Status: SHIPPED | OUTPATIENT
Start: 2023-05-02

## 2023-05-02 RX ORDER — LIDOCAINE HYDROCHLORIDE 10 MG/ML
INJECTION, SOLUTION EPIDURAL; INFILTRATION; INTRACAUDAL; PERINEURAL AS NEEDED
Status: DISCONTINUED | OUTPATIENT
Start: 2023-05-02 | End: 2023-05-02

## 2023-05-02 RX ORDER — SODIUM CHLORIDE, SODIUM LACTATE, POTASSIUM CHLORIDE, CALCIUM CHLORIDE 600; 310; 30; 20 MG/100ML; MG/100ML; MG/100ML; MG/100ML
20 INJECTION, SOLUTION INTRAVENOUS CONTINUOUS
Status: DISCONTINUED | OUTPATIENT
Start: 2023-05-02 | End: 2023-05-05 | Stop reason: HOSPADM

## 2023-05-02 RX ADMIN — BUPIVACAINE HYDROCHLORIDE 7 ML: 5 INJECTION, SOLUTION EPIDURAL; INTRACAUDAL; PERINEURAL at 13:14

## 2023-05-02 RX ADMIN — CEFEPIME 2000 MG: 2 INJECTION, POWDER, FOR SOLUTION INTRAVENOUS at 17:42

## 2023-05-02 RX ADMIN — FENTANYL CITRATE 50 MCG: 50 INJECTION, SOLUTION INTRAMUSCULAR; INTRAVENOUS at 13:10

## 2023-05-02 RX ADMIN — MIDAZOLAM 2 MG: 1 INJECTION INTRAMUSCULAR; INTRAVENOUS at 13:10

## 2023-05-02 RX ADMIN — PROPOFOL 200 MG: 10 INJECTION, EMULSION INTRAVENOUS at 13:43

## 2023-05-02 RX ADMIN — BUPIVACAINE 18 ML: 13.3 INJECTION, SUSPENSION, LIPOSOMAL INFILTRATION at 13:14

## 2023-05-02 RX ADMIN — SODIUM CHLORIDE, SODIUM LACTATE, POTASSIUM CHLORIDE, AND CALCIUM CHLORIDE 20 ML/HR: .6; .31; .03; .02 INJECTION, SOLUTION INTRAVENOUS at 16:00

## 2023-05-02 RX ADMIN — INSULIN GLARGINE 40 UNITS: 100 INJECTION, SOLUTION SUBCUTANEOUS at 21:40

## 2023-05-02 RX ADMIN — LIDOCAINE HYDROCHLORIDE 20 MG: 10 INJECTION, SOLUTION EPIDURAL; INFILTRATION; INTRACAUDAL; PERINEURAL at 13:43

## 2023-05-02 RX ADMIN — DEXAMETHASONE SODIUM PHOSPHATE 5 MG: 4 INJECTION INTRA-ARTICULAR; INTRALESIONAL; INTRAMUSCULAR; INTRAVENOUS; SOFT TISSUE at 13:43

## 2023-05-02 RX ADMIN — INSULIN LISPRO 1 UNITS: 100 INJECTION, SOLUTION INTRAVENOUS; SUBCUTANEOUS at 17:40

## 2023-05-02 RX ADMIN — TRAZODONE HYDROCHLORIDE 50 MG: 50 TABLET ORAL at 21:40

## 2023-05-02 RX ADMIN — ONDANSETRON 4 MG: 2 INJECTION INTRAMUSCULAR; INTRAVENOUS at 14:20

## 2023-05-02 RX ADMIN — ROCURONIUM BROMIDE 50 MG: 50 INJECTION INTRAVENOUS at 13:43

## 2023-05-02 RX ADMIN — INSULIN LISPRO 5 UNITS: 100 INJECTION, SOLUTION INTRAVENOUS; SUBCUTANEOUS at 21:40

## 2023-05-02 RX ADMIN — SODIUM CHLORIDE, SODIUM LACTATE, POTASSIUM CHLORIDE, AND CALCIUM CHLORIDE: .6; .31; .03; .02 INJECTION, SOLUTION INTRAVENOUS at 13:10

## 2023-05-02 RX ADMIN — PHENYLEPHRINE HYDROCHLORIDE 50 MCG/MIN: 10 INJECTION INTRAVENOUS at 13:56

## 2023-05-02 RX ADMIN — VANCOMYCIN HYDROCHLORIDE 1000 MG: 1 INJECTION, SOLUTION INTRAVENOUS at 14:10

## 2023-05-02 RX ADMIN — SODIUM CHLORIDE, SODIUM GLUCONATE, SODIUM ACETATE, POTASSIUM CHLORIDE, MAGNESIUM CHLORIDE, SODIUM PHOSPHATE, DIBASIC, AND POTASSIUM PHOSPHATE 100 ML/HR: .53; .5; .37; .037; .03; .012; .00082 INJECTION, SOLUTION INTRAVENOUS at 00:19

## 2023-05-02 RX ADMIN — SUGAMMADEX 200 MG: 100 INJECTION, SOLUTION INTRAVENOUS at 14:31

## 2023-05-02 NOTE — DISCHARGE INSTR - AVS FIRST PAGE
Discharge Instructions - Orthopedics  Othelia Hockey 69 y o  male MRN: [id number]  Unit/Bed#: OR POOL    Weight Bearing Status:                                           No bearing weight on left arm    Pain:  Continue analgesics as directed  Ice - 20 minutes on and 20 minutes off  Tylenol up to 1000 mg every 8 hours  Oxycodone 5 mg every 6 hours for severe pain only  Dressing Instructions:   Please keep clean, dry and intact until follow up   Do not get incision or dressing wet  Appt Instructions: If you do not have your appointment, please call the clinic at [phone] to see Dr Daron Velez in 2 weeks    Otherwise followup as scheduled     Contact the office sooner if you experience any increasing numbness/tingling in the extremities, uncontrolled pain or bleeding

## 2023-05-02 NOTE — PROGRESS NOTES
Progress Note - Orthopedics   Brendon Ernst 69 y o  male MRN: [id number]  Unit/Bed#: PACU 10      Subjective:    69 y o male s/p L shoulder irrigation and debridement  No acute events, no new complaints  Patient doing well  Pain well controlled  Denies fevers, chills, CP, SOB, N/V, numbness or tingling      Labs:  0   Lab Value Date/Time    HCT 41 6 05/01/2023 1143    HCT 34 6 (L) 04/05/2023 0617    HCT 40 4 04/04/2023 0853    HGB 13 4 05/01/2023 1143    HGB 11 2 (L) 04/05/2023 0617    HGB 13 1 04/04/2023 0853    INR 1 07 05/01/2023 1143    WBC 5 95 05/01/2023 1143    WBC 5 17 04/05/2023 0617    WBC 3 91 (L) 04/04/2023 0853     (H) 05/01/2023 1143    CRP 52 6 (H) 05/01/2023 1143       Meds:    Current Facility-Administered Medications:     [MAR Hold] acetaminophen (TYLENOL) tablet 650 mg, 650 mg, Oral, Q4H PRN, Jon Pace MD    [facility] Hold] diltiazem (CARDIZEM CD) 24 hr capsule 180 mg, 180 mg, Oral, Daily, Georges Sicard, CRNP    [facility] Hold] hydrALAZINE (APRESOLINE) tablet 25 mg, 25 mg, Oral, Q8H PRN, Georges Sicard, CRNP    [facility] Hold] HYDROmorphone HCl (DILAUDID) injection 0 2 mg, 0 2 mg, Intravenous, Q4H PRN, Jon Pace MD    HYDROmorphone HCl (DILAUDID) injection 0 2 mg, 0 2 mg, Intravenous, Q5 Min PRN, Ama Morales MD    [facility] Hold] insulin glargine (LANTUS) subcutaneous injection 40 Units 0 4 mL, 40 Units, Subcutaneous, HS, AMOR Obando    [facility] Hold] insulin lispro (HumaLOG) 100 units/mL subcutaneous injection 1-5 Units, 1-5 Units, Subcutaneous, TID AC, 1 Units at 05/01/23 1817 **AND** Fingerstick Glucose (POCT), , , TID ACVirginia CRNP    [MAR Hold] insulin lispro (HumaLOG) 100 units/mL subcutaneous injection 1-5 Units, 1-5 Units, Subcutaneous, HS, AMOR Obando, 1 Units at 05/01/23 2136    lactated ringers infusion, 100 mL/hr, Intravenous, Continuous, Titi Meyer, VALENCIA, Last Rate: 100 mL/hr at 05/02/23 1445, 100 mL/hr at 05/02/23 1445    multi-electrolyte (PLASMALYTE-A/ISOLYTE-S PH 7 4) IV solution, 100 mL/hr, Intravenous, Continuous, Bargustavo Stewart, LEELEENP, Last Rate: 100 mL/hr at 05/02/23 0019, 100 mL/hr at 05/02/23 0019    [MAR Hold] naloxone (NARCAN) 0 04 mg/mL syringe 0 04 mg, 0 04 mg, Intravenous, Q1MIN PRN, Sj Plaza MD    [facility] Hold] [facility]ron [facility]) injection 4 mg, 4 mg, Intravenous, Q4H PRN, Sj Plaza MD    ondansetron [facility]) injection 4 mg, 4 mg, Intravenous, Once PRN, Lauren Manuel MD    [facility] Hold] oxyCODONE (ROXICODONE) IR tablet 5 mg, 5 mg, Oral, Q4H PRN, Sj Plaza MD    [facility] Hold] oxyCODONE (ROXICODONE) split tablet 2 5 mg, 2 5 mg, Oral, Q4H PRN, Sj Plaaz MD    [facility] Hold] senna-docusate sodium (SENOKOT S) 8 6-50 mg per tablet 1 tablet, 1 tablet, Oral, HS, Sj Plaza MD    [facility] Hold] traZODone (DESYREL) tablet 50 mg, 50 mg, Oral, HS, Virginia Kunz, CRNP, 50 mg at 05/01/23 2136    [MAR Hold] vancomycin (VANCOCIN) IVPB (premix in dextrose) 1,000 mg 200 mL, 1,000 mg, Intravenous, PRN, MD Tina Ibanez  [facility] Hold] vancomycin (VANCOCIN) IVPB (premix in dextrose) 1,000 mg 200 mL, 1,000 mg, Intravenous, Q24H, Arielle Frazier MD    Blood Culture:   Lab Results   Component Value Date    BLOODCX No Growth After 5 Days  09/13/2022    BLOODCX No Growth After 5 Days  09/13/2022       Wound Culture:   Lab Results   Component Value Date    WOUNDCULT 1+ Growth of Klebsiella oxytoca (A) 09/14/2022    WOUNDCULT 1+ Growth of 09/14/2022       Ins and Outs:  I/O last 24 hours:   In: 2041 7 [P O :360; I V :1681 7]  Out: -           Physical:  Vitals:    05/02/23 1500   BP: 109/62   Pulse: 74   Resp: 19   Temp:    SpO2: 100%     Musculoskeletal: left Upper Extremity  · Incisional vac in place  · TTP celina-incisionally  · Sensation intact to median/radial/ulnar nerve distribution   · Motor intact anterior interosseous nerve/posterior interosseous nerve/median/radial/ulnar nerve distributions  · 2+ radial pulse, symmetric bilaterally  · Digits warm and well perfused  · Capillary refill < 2 seconds    Assessment:    67 y o male s/p L shoulder irrigation and debridement  Patient doing well  Incisional vac to remain on for the next couples days, will monitor output  Continue IV abx       Plan:  · NWB LUE in abduction sling  · Will monitor for ABLA and administer IVF/prbc as indicated for Greater than 2 gram drop or Hgb < 7  · Hold on PT while clinically improving from infection  · Continue IV abx  · F/u cx  · ID consult for antibiotic management  · Pain control  · DVT ppx - SCDs  · Dispo: Ortho will follow    Claudette Forward, MD

## 2023-05-02 NOTE — ANESTHESIA PREPROCEDURE EVALUATION
Procedure:  INCISION AND DRAINAGE (I&D) EXTREMITY (Left: Shoulder)  ARTHROPLASTY SHOULDER REVISION (Left: Shoulder)    Relevant Problems   CARDIO   (+) Benign essential HTN      ENDO   (+) Type 2 diabetes mellitus (HCC)      GI/HEPATIC   (+) Alcoholic cirrhosis (HCC)      /RENAL   (+) ABILIO (acute kidney injury) (HCC)      HEMATOLOGY   (+) Anemia   (+) Thrombocytopenia (HCC)      MUSCULOSKELETAL   (+) Post-traumatic osteoarthritis of left shoulder      PULMONARY   (+) MEG (obstructive sleep apnea)      Endocrine   (+) Diabetic ulcer of left foot associated with type 2 diabetes mellitus (HCC)      Other   (+) Alcohol abuse   (+) Body mass index (BMI) of 36 0-36 9 in adult   (+) Obesity, morbid (HCC)   (+) S/P reverse total shoulder arthroplasty, left        Physical Exam    Airway    Mallampati score: III  TM Distance: >3 FB  Neck ROM: full     Dental       Cardiovascular      Pulmonary      Other Findings        Anesthesia Plan  ASA Score- 3     Anesthesia Type- general with ASA Monitors  Additional Monitors:   Airway Plan: ETT  Comment: Left ISB with exparel per surgeon request  H/o cirrhosis  Platelets 813, INR 6 89 5/1/2023  Plan Factors-    Chart reviewed  EKG reviewed  Existing labs reviewed  Patient summary reviewed  Induction- intravenous  Postoperative Plan- Plan for postoperative opioid use  Informed Consent- Anesthetic plan and risks discussed with patient  I personally reviewed this patient with the CRNA  Discussed and agreed on the Anesthesia Plan with the CRNA  Arleth Powell

## 2023-05-02 NOTE — CONSULTS
Consultation - Infectious Disease   Luis Gibbs 69 y o  male MRN: [id number]  Unit/Bed#: [facility] 622-01 Encounter: [id number]      IMPRESSION & RECOMMENDATIONS:   1  Left shoulder surgical site infection  Status post recent total shoulder arthroplasty on 4/4/2023  The patient is now status post incision and drainage of the shoulder wound with no deep collection found  Still must consider the possibility of an evolving joint infection although operatively not consistent with this  Fortunately the patient is not systemically ill and seems to tolerate the antibiotics without difficulty   -Continue vancomycin  -Consult pharmacy for vancomycin trough management  -Add cefepime 2 g IV every 12 hours  -Follow-up wound cultures and adjust antibiotics needed  -Local wound care  -Recheck CBC with differential and BMP    2  Acute kidney injury  Likely multifactorial including prerenal issues  Renal function seems to be improving   -Volume management  -Dose adjusted antibiotics  -Recheck BMP  -Post nephrology follow-up    3  Diabetes mellitus  Type II with hyperglycemia and a recent hemoglobin A1c of 8 4  As a risk factor for recurrent infection  -Tighten diabetic control    Have discussed the above management plan in detail with the primary service    Extensive review of the medical records in epic including review of the notes, radiographs, and laboratory results     HISTORY OF PRESENT ILLNESS:  Reason for Consult: Left shoulder infection  HPI: Luis Gibbs is a 69y o  year old male with diabetes mellitus, liver disease, admitted to [facility] in [place] with worsening left shoulder swelling redness pain and drainage who we are asked to assist with antibiotic management  Patient underwent left total shoulder arthroplasty on 4/4/2023  He has been receiving rehab and doing relatively well until late last week when he began developing redness and some swelling involving the left shoulder    He was started on oral Keflex as an outpatient but had worsening over the weekend  Yesterday he turned his arm and the entire wound broke open with purulent drainage expressed  He therefore was admitted yesterday for IV antibiotics and surgical intervention  Today he underwent I&D of left shoulder and was found to have a superficial area of purulence with fat necrosis  The surgical findings did not reveal any deep collection with intact muscle and fascia  He denies any recent fever chills or sweats, denies any nausea vomiting or diarrhea, denies any cough or shortness of breath, denies any dysuria or hematuria  REVIEW OF SYSTEMS:  A complete review of systems is negative other than that noted in the HPI  PAST MEDICAL HISTORY:  Past Medical History:   Diagnosis Date    Arrhythmia     CPAP (continuous positive airway pressure) dependence     Diabetes mellitus ([facility] [address] )     History of echocardiogram 11/14/2017    showed EF of 50-55 percentWith moderate LVH and left ventricle diastolic dysfunction  Left atrium was moderately enlarged  Trace MR noted   History of Holter monitoring 11/21/2017    showed baseline rhythm of sinus origin with an average heart it of 61 bpm  The lowest heart rate was 49 and the highest heart rate was 10 8 bpm  There were rare single VPCs, and frequent PACs representing 3 2% of total beats  There were several episodes of sinus arrhythmias with sinus bradycardia and heart rate ranging from 40-90 bpm  No sustained dysrhythmias, or pauses noted   The patient did not    Hypertension     Liver disease     Obese     Sleep apnea      Past Surgical History:   Procedure Laterality Date    CATARACT EXTRACTION      EYE SURGERY Left 1997    HERNIA REPAIR  [id number]    KNEE ARTHROPLASTY Right 2008    TN ARTHROPLASTY GLENOHUMERAL JOINT TOTAL SHOULDER Left 4/4/2023    Procedure: ARTHROPLASTY SHOULDER REVERSE;  Surgeon: Nikhil Christopher MD;  Location: [facility];  Service: Orthopedics    SHOULDER SURGERY Right        FAMILY HISTORY:  Non-contributory    SOCIAL HISTORY:  Social History   Social History     Substance and Sexual Activity   Alcohol Use Not Currently    Comment: quit      Social History     Substance and Sexual Activity   Drug Use Never     Social History     Tobacco Use   Smoking Status Former    Packs/day: 0 50    Years: 20 00    Pack years: 10 00    Types: Cigarettes    Quit date: 46    Years since quittin 3   Smokeless Tobacco Never       ALLERGIES:  Allergies   Allergen Reactions    Sulfa Antibiotics Hives       MEDICATIONS:  All current active medications have been reviewed  Antibiotics: Vancomycin 2    PHYSICAL EXAM:  Temp:  [97 7 °F (36 5 °C)-98 °F (36 7 °C)] 97 7 °F (36 5 °C)  HR:  [61-93] 77  Resp:  [14-19] 16  BP: ()/(57-69) 103/69  SpO2:  [96 %-100 %] 98 %  Temp (24hrs), Av 8 °F (36 6 °C), Min:97 7 °F (36 5 °C), Max:98 °F (36 7 °C)  Current: Temperature: 97 7 °F (36 5 °C)    Intake/Output Summary (Last 24 hours) at 2023 1549  Last data filed at 2023 1436  Gross per 24 hour   Intake  67 ml   Output --   Net  67 ml       General Appearance:  Appearing well, nontoxic, and in no distress   Head:  Normocephalic, without obvious abnormality, atraumatic   Eyes:  Conjunctiva pink and sclera anicteric, both eyes   Nose: Nares normal, mucosa normal, no drainage   Throat: Oropharynx moist without lesions   Neck: Supple, symmetrical, no adenopathy, no tenderness/mass/nodules   Back:   Symmetric, no curvature, ROM normal, no CVA tenderness   Lungs:   Clear to auscultation bilaterally, respirations unlabored   Chest Wall:  No tenderness or deformity   Heart:  RRR; no murmur, rub or gallop   Abdomen:   Soft, non-tender, non-distended, positive bowel sounds    Extremities: No cyanosis, clubbing  Left shoulder with a VAC dressing in place  Skin: No rashes or lesions  No draining wounds noted     Lymph nodes: Cervical, supraclavicular nodes normal Neurologic: Alert and oriented times 3, extremity strength 5/5 and symmetric       LABS, IMAGING, & OTHER STUDIES:  Lab Results:  I have personally reviewed pertinent labs    Results from last 7 days   Lab Units 05/01/23  1143   WBC Thousand/uL 5 95   HEMOGLOBIN g/dL 13 4   PLATELETS Thousands/uL 130*     Results from last 7 days   Lab Units 05/02/23  0552 05/01/23  1809 05/01/23  1143   SODIUM mmol/L 136 133* 134*   POTASSIUM mmol/L 4 3 4 2 4 6   CHLORIDE mmol/L 107 106 107   CO2 mmol/L 23 25 23   BUN mg/dL 44* 52* 51*   CREATININE mg/dL 1 98* 2 36* 2 56*   EGFR ml/min/1 73sq m 33 27 24   CALCIUM mg/dL 9 5 9 6 9 8             Results from last 7 days   Lab Units 05/01/23  1143   CRP mg/L 52 6*               Imaging Studies:     Chest x-ray-no acute cardiopulmonary disease    Images personally reviewed by me in PACS

## 2023-05-02 NOTE — PLAN OF CARE
Problem: PAIN - ADULT  Goal: Verbalizes/displays adequate comfort level or baseline comfort level  Description: Interventions:  - Encourage patient to monitor pain and request assistance  - Assess pain using appropriate pain scale  - Administer analgesics based on type and severity of pain and evaluate response  - Implement non-pharmacological measures as appropriate and evaluate response  - Consider cultural and social influences on pain and pain management  - Notify physician/advanced practitioner if interventions unsuccessful or patient reports new pain  Outcome: Progressing     Problem: INFECTION - ADULT  Goal: Absence or prevention of progression during hospitalization  Description: INTERVENTIONS:  - Assess and monitor for signs and symptoms of infection  - Monitor lab/diagnostic results  - Monitor all insertion sites, i e  indwelling lines, tubes, and drains  - Monitor endotracheal if appropriate and nasal secretions for changes in amount and color  - [place] appropriate cooling/warming therapies per order  - Administer medications as ordered  - Instruct and encourage patient and family to use good hand hygiene technique  - Identify and instruct in appropriate isolation precautions for identified infection/condition  Outcome: Progressing  Goal: Absence of fever/infection during neutropenic period  Description: INTERVENTIONS:  - Monitor WBC    Outcome: Progressing     Problem: SAFETY ADULT  Goal: Patient will remain free of falls  Description: INTERVENTIONS:  - Educate patient/family on patient safety including physical limitations  - Instruct patient to call for assistance with activity   - Consult OT/PT to assist with strengthening/mobility   - Keep Call bell within reach  - Keep bed low and locked with side rails adjusted as appropriate  - Keep care items and personal belongings within reach  - Initiate and maintain comfort rounds  - Make Fall Risk Sign visible to staff  - Offer Toileting every 2 Hours, in advance of need  - Initiate/Maintain bed alarm  - Obtain necessary fall risk management equipment:   - Apply yellow socks and bracelet for high fall risk patients  - Consider moving patient to room near nurses station  Outcome: Progressing  Goal: Maintain or return to baseline ADL function  Description: INTERVENTIONS:  -  Assess patient's ability to carry out ADLs; assess patient's baseline for ADL function and identify physical deficits which impact ability to perform ADLs (bathing, care of mouth/teeth, toileting, grooming, dressing, etc )  - Assess/evaluate cause of self-care deficits   - Assess range of motion  - Assess patient's mobility; develop plan if impaired  - Assess patient's need for assistive devices and provide as appropriate  - Encourage maximum independence but intervene and supervise when necessary  - Involve family in performance of ADLs  - Assess for home care needs following discharge   - Consider OT consult to assist with ADL evaluation and planning for discharge  - Provide patient education as appropriate  Outcome: Progressing  Goal: Maintains/Returns to pre admission functional level  Description: INTERVENTIONS:  - Perform BMAT or MOVE assessment daily    - Set and communicate daily mobility goal to care team and patient/family/caregiver  - Collaborate with rehabilitation services on mobility goals if consulted  - Perform Range of Motion 3 times a day  - Reposition patient every 2 hours    - Dangle patient 3 times a day  - Stand patient 3 times a day  - Ambulate patient 3 times a day  - Out of bed to chair 3 times a day   - Out of bed for meals 3 times a day  - Out of bed for toileting  - Record patient progress and toleration of activity level   Outcome: Progressing     Problem: DISCHARGE PLANNING  Goal: Discharge to home or other facility with appropriate resources  Description: INTERVENTIONS:  - Identify barriers to discharge w/patient and caregiver  - Arrange for needed discharge resources and transportation as appropriate  - Identify discharge learning needs (meds, wound care, etc )  - Arrange for interpretive services to assist at discharge as needed  - Refer to Case Management Department for coordinating discharge planning if the patient needs post-hospital services based on physician/advanced practitioner order or complex needs related to functional status, cognitive ability, or social support system  Outcome: Progressing     Problem: Knowledge Deficit  Goal: Patient/family/caregiver demonstrates understanding of disease process, treatment plan, medications, and discharge instructions  Description: Complete learning assessment and assess knowledge base    Interventions:  - Provide teaching at level of understanding  - Provide teaching via preferred learning methods  Outcome: Progressing

## 2023-05-02 NOTE — PROGRESS NOTES
Miguelito Bautista is a 69 y o  male who is currently ordered Vancomycin IV with management by the Pharmacy Consult service  Relevant clinical data and objective / subjective history reviewed  Vancomycin Assessment:  Indication and Goal AUC/Trough: Soft tissue (goal -600, trough >10), -600, trough >10  Clinical Status: stable  Micro:   No new data  Renal Function:  SCr: 2 mg/dL  CrCl: 44 mL/min  Renal replacement: Not on dialysis  Days of Therapy: 2  Current Dose: 1 gm prn  Vancomycin Plan:  New Dosin gm q24  Estimated AUC: 451 mcg*hr/mL  Estimated Trough: 13 9 mcg/mL  Next Level: amdraw 5-9  Renal Function Monitoring: Daily BMP and UOP  Pharmacy will continue to follow closely for s/sx of nephrotoxicity, infusion reactions and appropriateness of therapy  BMP and CBC will be ordered per protocol  We will continue to follow the patients culture results and clinical progress daily      Angie Naidu, Pharmacist

## 2023-05-02 NOTE — PLAN OF CARE
Problem: PAIN - ADULT  Goal: Verbalizes/displays adequate comfort level or baseline comfort level  Description: Interventions:  - Encourage patient to monitor pain and request assistance  - Assess pain using appropriate pain scale  - Administer analgesics based on type and severity of pain and evaluate response  - Implement non-pharmacological measures as appropriate and evaluate response  - Consider cultural and social influences on pain and pain management  - Notify physician/advanced practitioner if interventions unsuccessful or patient reports new pain  Outcome: Progressing     Problem: INFECTION - ADULT  Goal: Absence or prevention of progression during hospitalization  Description: INTERVENTIONS:  - Assess and monitor for signs and symptoms of infection  - Monitor lab/diagnostic results  - Monitor all insertion sites, i e  indwelling lines, tubes, and drains  - Monitor endotracheal if appropriate and nasal secretions for changes in amount and color  - [place] appropriate cooling/warming therapies per order  - Administer medications as ordered  - Instruct and encourage patient and family to use good hand hygiene technique  - Identify and instruct in appropriate isolation precautions for identified infection/condition  Outcome: Progressing  Goal: Absence of fever/infection during neutropenic period  Description: INTERVENTIONS:  - Monitor WBC    Outcome: Progressing     Problem: SAFETY ADULT  Goal: Patient will remain free of falls  Description: INTERVENTIONS:  - Educate patient/family on patient safety including physical limitations  - Instruct patient to call for assistance with activity   - Consult OT/PT to assist with strengthening/mobility   - Keep Call bell within reach  - Keep bed low and locked with side rails adjusted as appropriate  - Keep care items and personal belongings within reach  - Initiate and maintain comfort rounds  - Make Fall Risk Sign visible to staff  - Offer Toileting every 2 Hours, in advance of need  - Initiate/Maintain alarm  - Obtain necessary fall risk management equipment:   - Apply yellow socks and bracelet for high fall risk patients  - Consider moving patient to room near nurses station  Outcome: Progressing  Goal: Maintain or return to baseline ADL function  Description: INTERVENTIONS:  -  Assess patient's ability to carry out ADLs; assess patient's baseline for ADL function and identify physical deficits which impact ability to perform ADLs (bathing, care of mouth/teeth, toileting, grooming, dressing, etc )  - Assess/evaluate cause of self-care deficits   - Assess range of motion  - Assess patient's mobility; develop plan if impaired  - Assess patient's need for assistive devices and provide as appropriate  - Encourage maximum independence but intervene and supervise when necessary  - Involve family in performance of ADLs  - Assess for home care needs following discharge   - Consider OT consult to assist with ADL evaluation and planning for discharge  - Provide patient education as appropriate  Outcome: Progressing  Goal: Maintains/Returns to pre admission functional level  Description: INTERVENTIONS:  - Perform BMAT or MOVE assessment daily    - Set and communicate daily mobility goal to care team and patient/family/caregiver  - Collaborate with rehabilitation services on mobility goals if consulted  - Perform Range of Motion 3 times a day  - Reposition patient every 2 hours    - Dangle patient 3 times a day  - Stand patient 3 times a day  - Ambulate patient 3 times a day  - Out of bed to chair 3 times a day   - Out of bed for meals 3 times a day  - Out of bed for toileting  - Record patient progress and toleration of activity level   Outcome: Progressing     Problem: DISCHARGE PLANNING  Goal: Discharge to home or other facility with appropriate resources  Description: INTERVENTIONS:  - Identify barriers to discharge w/patient and caregiver  - Arrange for needed discharge resources and transportation as appropriate  - Identify discharge learning needs (meds, wound care, etc )  - Arrange for interpretive services to assist at discharge as needed  - Refer to Case Management Department for coordinating discharge planning if the patient needs post-hospital services based on physician/advanced practitioner order or complex needs related to functional status, cognitive ability, or social support system  Outcome: Progressing     Problem: Knowledge Deficit  Goal: Patient/family/caregiver demonstrates understanding of disease process, treatment plan, medications, and discharge instructions  Description: Complete learning assessment and assess knowledge base    Interventions:  - Provide teaching at level of understanding  - Provide teaching via preferred learning methods  Outcome: Progressing

## 2023-05-02 NOTE — ANESTHESIA POSTPROCEDURE EVALUATION
Post-Op Assessment Note    CV Status:  Stable  Pain Score: 0    Pain management: adequate     Mental Status:  Alert and awake   Hydration Status:  Euvolemic   PONV Controlled:  Controlled   Airway Patency:  Patent      Post Op Vitals Reviewed: Yes      Staff: CRNA   Comments: Pt awake, alert, able to maintain own airway, VSS, report to recovery RN        No notable events documented      BP   98/64   Temp   97 2   Pulse  76   Resp   16   SpO2   99% RA

## 2023-05-02 NOTE — PLAN OF CARE
Problem: PAIN - ADULT  Goal: Verbalizes/displays adequate comfort level or baseline comfort level  Description: Interventions:  - Encourage patient to monitor pain and request assistance  - Assess pain using appropriate pain scale  - Administer analgesics based on type and severity of pain and evaluate response  - Implement non-pharmacological measures as appropriate and evaluate response  - Consider cultural and social influences on pain and pain management  - Notify physician/advanced practitioner if interventions unsuccessful or patient reports new pain  Outcome: Progressing     Problem: INFECTION - ADULT  Goal: Absence or prevention of progression during hospitalization  Description: INTERVENTIONS:  - Assess and monitor for signs and symptoms of infection  - Monitor lab/diagnostic results  - Monitor all insertion sites, i e  indwelling lines, tubes, and drains  - Monitor endotracheal if appropriate and nasal secretions for changes in amount and color  - [place] appropriate cooling/warming therapies per order  - Administer medications as ordered  - Instruct and encourage patient and family to use good hand hygiene technique  - Identify and instruct in appropriate isolation precautions for identified infection/condition  Outcome: Progressing  Goal: Absence of fever/infection during neutropenic period  Description: INTERVENTIONS:  - Monitor WBC    Outcome: Progressing     Problem: SAFETY ADULT  Goal: Patient will remain free of falls  Description: INTERVENTIONS:  - Educate patient/family on patient safety including physical limitations  - Instruct patient to call for assistance with activity   - Consult OT/PT to assist with strengthening/mobility   - Keep Call bell within reach  - Keep bed low and locked with side rails adjusted as appropriate  - Keep care items and personal belongings within reach  - Initiate and maintain comfort rounds  - Make Fall Risk Sign visible to staff  - Offer Toileting   - Apply yellow socks and bracelet for high fall risk patients  - Consider moving patient to room near nurses station  Outcome: Progressing  Goal: Maintain or return to baseline ADL function  Description: INTERVENTIONS:  -  Assess patient's ability to carry out ADLs; assess patient's baseline for ADL function and identify physical deficits which impact ability to perform ADLs (bathing, care of mouth/teeth, toileting, grooming, dressing, etc )  - Assess/evaluate cause of self-care deficits   - Assess range of motion  - Assess patient's mobility; develop plan if impaired  - Assess patient's need for assistive devices and provide as appropriate  - Encourage maximum independence but intervene and supervise when necessary  - Involve family in performance of ADLs  - Assess for home care needs following discharge   - Consider OT consult to assist with ADL evaluation and planning for discharge  - Provide patient education as appropriate  Outcome: Progressing  Goal: Maintains/Returns to pre admission functional level  Description: INTERVENTIONS:  - Perform BMAT or MOVE assessment daily    - Set and communicate daily mobility goal to care team and patient/family/caregiver     - Collaborate with rehabilitation services on mobility goals if consulted  - Out of bed for toileting  - Record patient progress and toleration of activity level   Outcome: Progressing     Problem: DISCHARGE PLANNING  Goal: Discharge to home or other facility with appropriate resources  Description: INTERVENTIONS:  - Identify barriers to discharge w/patient and caregiver  - Arrange for needed discharge resources and transportation as appropriate  - Identify discharge learning needs (meds, wound care, etc )  - Arrange for interpretive services to assist at discharge as needed  - Refer to Case Management Department for coordinating discharge planning if the patient needs post-hospital services based on physician/advanced practitioner order or complex needs related to functional status, cognitive ability, or social support system  Outcome: Progressing     Problem: Knowledge Deficit  Goal: Patient/family/caregiver demonstrates understanding of disease process, treatment plan, medications, and discharge instructions  Description: Complete learning assessment and assess knowledge base    Interventions:  - Provide teaching at level of understanding  - Provide teaching via preferred learning methods  Outcome: Progressing

## 2023-05-02 NOTE — TELEPHONE ENCOUNTER
Called pt; we agreed to take his appts out until 5/15, and make contact next week re: whether he will be able to keep his PT appts or will need to delay further

## 2023-05-02 NOTE — ANESTHESIA PROCEDURE NOTES
Peripheral Block    Patient location during procedure: holding area  Start time: 5/2/2023 1:14 PM  Reason for block: at surgeon's request and post-op pain management  Staffing  Performed: Anesthesiologist   Anesthesiologist: Gary Sosa MD  Preanesthetic Checklist  Completed: patient identified, IV checked, site marked, risks and benefits discussed, surgical consent, monitors and equipment checked, pre-op evaluation and timeout performed  Peripheral Block  Patient position: supine  Prep: ChloraPrep  Patient monitoring: continuous pulse ox and frequent blood pressure checks  Block type: interscalene  Laterality: left  Injection technique: single-shot  Procedures: ultrasound guided, Ultrasound guidance required for the procedure to increase accuracy and safety of medication placement and decrease risk of complications  Ultrasound permanent image saved  Needle  Needle type: Stimuplex   Needle gauge: 22 G  Needle length: 5 cm  Needle localization: anatomical landmarks and ultrasound guidance  Test dose: negative  Assessment  Injection assessment: incremental injection, local visualized surrounding nerve on ultrasound, negative aspiration for heme and no paresthesia on injection  Paresthesia pain: none  Post-procedure:  site cleaned  patient tolerated the procedure well with no immediate complications  Additional Notes  Site cleaned  No areas of cellulitis noted over entire neck region    Block placed per Dr Wang Rho request

## 2023-05-02 NOTE — PROGRESS NOTES
Progress Note - Orthopedics   Miguelito Bautista 69 y o  male MRN: [id number]  Unit/Bed#: [facility] 622-01      Subjective:    67 y o male POD 1 L shoulder superficial I+D  Intraoperatively, superficial area of purulence and fat necrosis encountered at surgical site, no evidence of deep infection  Cultures sent  ID consulted yesterday to assist with antibiotic selection, recommending continuation of vancomycin and addition of cefepime  No acute events, no new complaints  Patient stable  Pain well controlled  Denies fevers, chills, CP, SOB, N/V, numbness or tingling       Labs:  0   Lab Value Date/Time    HCT 41 6 05/01/2023 1143    HCT 34 6 (L) 04/05/2023 0617    HCT 40 4 04/04/2023 0853    HGB 13 4 05/01/2023 1143    HGB 11 2 (L) 04/05/2023 0617    HGB 13 1 04/04/2023 0853    INR 1 07 05/01/2023 1143    WBC 5 95 05/01/2023 1143    WBC 5 17 04/05/2023 0617    WBC 3 91 (L) 04/04/2023 0853     (H) 05/01/2023 1143    CRP 52 6 (H) 05/01/2023 1143       Meds:    Current Facility-Administered Medications:     acetaminophen (TYLENOL) tablet 650 mg, 650 mg, Oral, Q4H PRN, Aileen Sheffield PA-C    cefepime (MAXIPIME) 2 g/50 mL dextrose IVPB, 2,000 mg, Intravenous, Q12H, [employer] AMOR Marie, Last Rate: 100 mL/hr at 05/02/23 1742, 2,000 mg at 05/02/23 1742    diltiazem (CARDIZEM CD) 24 hr capsule 180 mg, 180 mg, Oral, Daily, Aileen Sheffield PA-C    hydrALAZINE (APRESOLINE) tablet 25 mg, 25 mg, Oral, Q8H PRN, Aileen Sheffield PA-C    HYDROmorphone HCl (DILAUDID) injection 0 2 mg, 0 2 mg, Intravenous, Q4H PRN, Aileen Sheffield PA-C    insulin glargine (LANTUS) subcutaneous injection 40 Units 0 4 mL, 40 Units, Subcutaneous, HS, Aileen Manzos, PA-C    insulin lispro (HumaLOG) 100 units/mL subcutaneous injection 1-5 Units, 1-5 Units, Subcutaneous, TID AC, 1 Units at 05/02/23 1740 **AND** Fingerstick Glucose (POCT), , , TID GARIMA, Aileen Sheffield PA-C    insulin lispro (HumaLOG) 100 units/mL subcutaneous injection 1-5 Units, 1-5 Units, Subcutaneous, HS, Justin Reddish, PA-C, 1 Units at 05/01/23 2136    lactated ringers infusion, 20 mL/hr, Intravenous, Continuous, Shad Packer MD, Last Rate: 20 mL/hr at 05/02/23 1600, 20 mL/hr at 05/02/23 1600    lactated ringers infusion, 100 mL/hr, Intravenous, Continuous, Titi Meyer CRNA, Last Rate: 100 mL/hr at 05/02/23 1445, 100 mL/hr at 05/02/23 1445    multi-electrolyte (PLASMALYTE-A/ISOLYTE-S PH 7 4) IV solution, 100 mL/hr, Intravenous, Continuous, Justin Reddish, PA-C, Last Rate: 100 mL/hr at 05/02/23 0019, 100 mL/hr at 05/02/23 0019    naloxone (NARCAN) 0 04 mg/mL syringe 0 04 mg, 0 04 mg, Intravenous, Q1MIN PRN, Justin Reddish, PA-C    ondansetron [facility]) injection 4 mg, 4 mg, Intravenous, Q4H PRN, Justin Reddish, PA-C    oxyCODONE (ROXICODONE) IR tablet 5 mg, 5 mg, Oral, Q4H PRN, Justin Reddish, PA-C    oxyCODONE (ROXICODONE) split tablet 2 5 mg, 2 5 mg, Oral, Q4H PRN, Justin Reddish, PA-C    senna-docusate sodium (SENOKOT S) 8 6-50 mg per tablet 1 tablet, 1 tablet, Oral, HS, Justin Reddish, PA-C    traZODone (DESYREL) tablet 50 mg, 50 mg, Oral, HS, Justin Reddish, PA-C, 50 mg at 05/01/23 2136    vancomycin (VANCOCIN) IVPB (premix in dextrose) 1,000 mg 200 mL, 1,000 mg, Intravenous, PRN, Justin Reddish, PA-C    vancomycin (VANCOCIN) IVPB (premix in dextrose) 1,000 mg 200 mL, 1,000 mg, Intravenous, Q24H, Justin Reddish, PA-C    Blood Culture:   Lab Results   Component Value Date    BLOODCX No Growth After 5 Days  09/13/2022    BLOODCX No Growth After 5 Days  09/13/2022       Wound Culture:   Lab Results   Component Value Date    WOUNDCULT 1+ Growth of Klebsiella oxytoca (A) 09/14/2022    WOUNDCULT 1+ Growth of 09/14/2022       Ins and Outs:  I/O last 24 hours:   In: 2041 7 [P O :360; I V :1681 7]  Out: -           Physical:  Vitals:    05/02/23 1643   BP: 105/67   Pulse: 83   Resp: 16   Temp: 98 °F (36 7 °C)   SpO2: 99%     Musculoskeletal: left Upper Extremity  · Skin intact surrounding dressing  No erythema, ecchymosis  · Incisional vac in place, set to continuous suction  · TTP celina incisionally  · Sensation intact to median/radial/ulnar nerve distribution   · Motor intact anterior interosseous nerve/posterior interosseous nerve/median/radial/ulnar nerve distributions  · 2+ radial pulse, symmetric bilaterally  · Digits warm and well perfused  · Capillary refill < 2 seconds    Assessment:    67 y o male with left rTSA superficial SSI now POD 1 from I+D  Patient doing well       Plan:  · NWB LUE in sling  · Follow up intra-operative cx  · Will hold off on PT/OT in setting of acute infection  · Maintain incisional vac in immediate post operative setting  · ID consult for abx selection - continue vanc and cefepime  · Pharmacy consult for vanco dosing  · Nephrology consult for ABILIO - will trend BMP and continue IVF  · Will monitor for ABLA and administer IVF/prbc as indicated for Greater than 2 gram drop or Hgb < 7  · Pain control  · DVT ppx SCDs  · Appreciate medical comanagement  · Dispo: Ortho will follow    Vincenza Boast, MD

## 2023-05-02 NOTE — PROGRESS NOTES
Progress Note - Nephrology   Gene Seth 69 y o  male MRN: [id number]  Unit/Bed#: [facility] 622-01 Encounter: [id number]    ASSESSMENT and PLAN:  Acute kidney injury (POA):  Etiology: Suspect multi- factorial:  prerenal azotemia in the setting of decreased oral intake, loss of autoregulatory system in the setting of losartan;  possible infection on Keflex-need to rule out AIN  Assessment:   After review medical records through [facility] everywhere baseline creatinine 0 9-1 2 dating back to 2020   Admission creatinine 2 56   Current creatinine 1 98   Losartan remains on hold   Remains on IV Isolyte 100 ml/hour  Workup:  · Urinalysis with micro-centrally bland  · Urine eosinophils-0%  · FeNA-1 22%-suggestive of ATN  · Renal ultrasound-without hydronephrosis  Lateral kidneys normal echogenicity and contour    Enlarged prostate  Plan:   Avoid hypotension or perturbations of blood pressure to prevent decreased renal perfusion   Avoid nephrotoxins, NSAIDs, IV contrast if possible   Continue with IV fluids   Okay for OR today with slowly improving creatinine-however patient at risk for ABILIO given recent ABILIO was discussed with patient and his wife at bedside   Discussed above plan with Dr Phil Brown who is agreement with the plan to continue IV fluids and holding losartan   Optimize hemodynamic status to avoid delay in renal recovery   Patient will require hospital follow-up and prefers [facility] office given the location     Blood pressure/Hypertension:  Assessment and Plan:   Current blood pressure normotensive   Home medications: Diltiazem CD1 80 mg daily, losartan 100 mg daily,   Avoid losartan at this time when ABILIO   Currently on diltiazem  80 mg daily   Maximize hemodynamics to maintain MAP >65   Avoid hypotension or fluctuations in blood pressure   Will continue to trend     Concern for shoulder infection  Assessment and Plan:   Orthopedic following   Plan surgical intervention I & D troday   Now on IV vancomycin   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 who presents to the emergency room at shoulder arthroplasty 4/4/2023      Review of systems  Patient seen and examined at bedside:  Review of Systems   Constitutional: Negative  Negative for activity change, appetite change, chills, diaphoresis, fatigue and fever  HENT: Negative  Negative for congestion and facial swelling  Respiratory: Negative  Cardiovascular: Negative  Gastrointestinal: Negative  Endocrine: Negative  Genitourinary: Negative  Musculoskeletal: Negative  Skin: Negative  Allergic/Immunologic: Negative  Neurological: Negative  Hematological: Negative  Psychiatric/Behavioral: Negative  Physical exam:  Current Weight:    Vitals:    05/01/23 1554 05/01/23 2254 05/01/23 2255 05/02/23 0804   BP: 134/65  111/68 124/57   BP Location:       Pulse: 61 92 93 71   Resp: 18   17   Temp: 97 9 °F (36 6 °C)  98 °F (36 7 °C) 97 8 °F (36 6 °C)   TempSrc:       SpO2: 98% 97% 96% 97%       Intake/Output Summary (Last 24 hours) at 5/2/2023 1241  Last data filed at 5/2/2023 0900  Gross per 24 hour   Intake 1341 67 ml   Output --   Net 1341 67 ml       Physical Exam  Vitals reviewed  Constitutional:       Appearance: Normal appearance  Comments: NAD, OOB   HENT:      Head: Normocephalic and atraumatic  Nose: Nose normal       Mouth/Throat:      Mouth: Mucous membranes are moist       Pharynx: Oropharynx is clear  Eyes:      Extraocular Movements: Extraocular movements intact  Conjunctiva/sclera: Conjunctivae normal    Cardiovascular:      Rate and Rhythm: Normal rate and regular rhythm  Pulses: Normal pulses  Heart sounds: Normal heart sounds  Pulmonary:      Breath sounds: Normal breath sounds  Abdominal:      General: Bowel sounds are normal       Palpations: Abdomen is soft  Musculoskeletal:      Cervical back: Normal range of motion and neck supple  Right lower leg: Edema present  Left lower leg: Edema present  Comments: No significant lower extremity edema  PVD discoloration bilaterally  Left arm in sling  Left shoulder dressing dry and intact   Skin:     General: Skin is warm  Neurological:      General: No focal deficit present  Mental Status: He is alert and oriented to person, place, and time  Psychiatric:         Mood and Affect: Mood normal          Behavior: Behavior normal          Thought Content:  Thought content normal          Judgment: Judgment normal             Medications:    Current Facility-Administered Medications:     acetaminophen (TYLENOL) tablet 650 mg, 650 mg, Oral, Q4H PRN, Tab Meng MD    diltiazem (CARDIZEM CD) 24 hr capsule 180 mg, 180 mg, Oral, Daily, AMOR Ingram    hydrALAZINE (APRESOLINE) tablet 25 mg, 25 mg, Oral, Q8H PRN, AMOR Ingram    HYDROmorphone HCl (DILAUDID) injection 0 2 mg, 0 2 mg, Intravenous, Q4H PRN, Tab Meng MD    insulin glargine (LANTUS) subcutaneous injection 40 Units 0 4 mL, 40 Units, Subcutaneous, HS, AMOR Obando    insulin lispro (HumaLOG) 100 units/mL subcutaneous injection 1-5 Units, 1-5 Units, Subcutaneous, TID AC, 1 Units at 05/01/23 1817 **AND** Fingerstick Glucose (POCT), , , TID AC, AMOR Obando    insulin lispro (HumaLOG) 100 units/mL subcutaneous injection 1-5 Units, 1-5 Units, Subcutaneous, HS, AMOR Obando, 1 Units at 05/01/23 2136    multi-electrolyte (PLASMALYTE-A/ISOLYTE-S PH 7 4) IV solution, 100 mL/hr, Intravenous, Continuous, AMOR Fong, Last Rate: 100 mL/hr at 05/02/23 0019, 100 mL/hr at 05/02/23 0019    naloxone (NARCAN) 0 04 mg/mL syringe 0 04 mg, 0 04 mg, Intravenous, Q1MIN PRN, Tab Meng MD    [facility]) injection 4 mg, 4 mg, Intravenous, Q4H PRN, Tab Meng MD    oxyCODONE (ROXICODONE) IR tablet 5 mg, 5 mg, Oral, Q4H PRN, Tab Meng, MD    oxyCODONE (ROXICODONE) split tablet 2 5 mg, 2 5 mg, Oral, Q4H PRN, Colleen Devlin MD    senna-docusate sodium (SENOKOT S) 8 6-50 mg per tablet 1 tablet, 1 tablet, Oral, HS, Colleen Devlin MD    traZODone (DESYREL) tablet 50 mg, 50 mg, Oral, HS, Seleta Donald, CRNP, 50 mg at 05/01/23 2136    vancomycin (VANCOCIN) IVPB (premix in dextrose) 1,000 mg 200 mL, 1,000 mg, Intravenous, PRN, Magdalene Calvert MD    vancomycin (VANCOCIN) IVPB (premix in dextrose) 1,000 mg 200 mL, 1,000 mg, Intravenous, Q24H, Magdalene Calvert MD    Laboratory Results:  Results from last 7 days   Lab Units 05/02/23  0552 05/01/23  1809 05/01/23  1143   WBC Thousand/uL  --   --  5 95   HEMOGLOBIN g/dL  --   --  13 4   HEMATOCRIT %  --   --  41 6   PLATELETS Thousands/uL  --   --  130*   SODIUM mmol/L 136 133* 134*   POTASSIUM mmol/L 4 3 4 2 4 6   CHLORIDE mmol/L 107 106 107   CO2 mmol/L 23 25 23   BUN mg/dL 44* 52* 51*   CREATININE mg/dL 1 98* 2 36* 2 56*   CALCIUM mg/dL 9 5 9 6 9 8

## 2023-05-02 NOTE — OP NOTE
OPERATIVE REPORT  PATIENT NAME: Yon Quezada    :  1955  MRN: [id number]  Pt Location:  OR ROOM 15    SURGERY DATE: 2023     SURGEON: Otoniel Hoffman MD     ASSISTANT: Carmelo Melgoza PA-C     NOTE: Carmelo Melgoza PA-C was present throughout the entire procedure and performed essential assistance with patient prepping, draping, positioning, trial implantation/range of motion, final implant insertion, careful retraction, wound closure, sterile dressing application and sling application, all under my direct supervision  NOTE: No qualified resident physician was available for assistance    SECOND ASSITANT: Eduardo Ventura ATC, OTC     PREOPERATIVE DIAGNOSIS: Left Shoulder Wound Infection    POSTOPERATIVE DIAGNOSIS: Left Shoulder Superficial Wound Infection    PROCEDURES: Irrigation and Debridement Left Shoulder    ANESTHESIA STAFF: Melody Lucas MD     ANESTHESIA TYPE: General with endotracheal tube with ultrasound guided interscalene block (Exparel)  The interscalene block was provided by the anesthesia staff per my request for postoperative pain control and to decrease the use of postoperative narcotic medication for pain control  COMPLICATIONS: None    FINDINGS: Superficial Area of Purulence/Fat Necrosis, No Deep Collection, No Tract Deep to Deltopectoral Interval    SPECIMEN(S):   ID Type Source Tests Collected by Time Destination   A :  Tissue Joint, Left Shoulder ANAEROBIC CULTURE AND GRAM STAIN, CULTURE, TISSUE AND GRAM STAIN Arielle Frazier MD 2023 1410        ESTIMATED BLOOD LOSS: Minimal    INDICATIONS FOR PROCEDURE:  The patient is a 69 y o  male presenting with a 4-day history of swelling and drainage from his surgical wound status post reverse total shoulder arthroplasty    Patient failed to improve on oral antibiotics was admitted for IV antibiotic and continue to have drainage from the wound so exploration of the wound and irrigation debridement was considered to help hopefully prevent any deep spread of a suspected superficial infection  Informed consent was obtained in the office  OPERATIVE TECHNIQUE:  The day of surgery I identified the patient's left shoulder and marked it with my initials  The patient was taken back to the operating room where anesthesia was induced by the anesthesia staff without complication  The patient was placed in the beachchair position with all bony prominences padded  The left shoulder was prepped and draped in routine sterile fashion and after a time-out for safety and confirming 1 gram of Vancomycin was given after cultures were obtained, and the skin incision was opened  There was noted to be purulence and erythema around the midportion of the incision, upon incising skin purulent appearing material was returned and there were areas of fat necrosis localized to an area of 2 x 2 cm just deep to the skin  No tracts were seen in any direction and the deltopectoral interval have been closed with no evidence of tracking deep to the deltopectoral interval   This did appear to be a superficial process so cultures were obtained and sent to the lab and a debridement of any of the necrotic material including skin, fat, muscle was performed sharply using a blade  This debridement continues to viable tissue  After debridement all Vicryl sutures seen were removed as there were multiple sutures in the area of the collection possibly representing suture abscess versus true infection  After the debridement and removal of any suture material the area was copiously irrigated with 6 L of normal saline using pulse lavage, after irrigation and debridement the area was clean without evidence of further collection  Again the deltopectoral was evaluated and found to have no area of dehiscence  The prosthesis itself was not explored given the patient's clinical history with lack of shoulder pain in the lack of any presence of deep infection    The skin was then closed with 2-0 nylons and an incisional VAC was placed over the incision to help manage any drainage, patient will continue on IV antibiotic and will be followed clinically  Postoperative physical therapy will be held until resolution of this process  Sterile dressings and a sling with abduction pillow was placed and the patient was awoken  The patient was transported to the recovery room in good condition and will be admitted for post-operative care  Infectious disease consultation be placed to help guide antibiotic treatment, given the patient's renal condition adjusting doses of the antibiotic has been performed by pharmacy however a more renal friendly antibiotic may be able to be recommended by the infectious disease consultants      PATIENT DISPOSITION:  Stable to PACU      SIGNATURE: Fish Maldonado MD  DATE: May 2, 2023  TIME: 2:41 PM

## 2023-05-02 NOTE — PROGRESS NOTES
Internal Medicine Progress Note  Patient: Rebecca Jeffries  Age/sex: 69 y o  male  Medical Record #: [id number]      ASSESSMENT/PLAN: (Interval History)  Rebecca Jeffries is seen and examined and management for following issues:    Status Post left Total SHOULDER ARTHROPLASTY now w/possible superficial infection   Pain controlled   Continue encourage incentive spirometry; monitor fever curve   For surgical washout today    cleared by renal for same   Cont abx per orthopedics/pharmacy w/Vanco renal dosed   Await interoperative cultures   DVT prophylaxis in place and reviewed  Results from last 7 days   Lab Units 05/01/23  1143   WBC Thousand/uL 5 95   HEMOGLOBIN g/dL 13 4   HEMATOCRIT % 41 6   PLATELETS Thousands/uL 130*       HTN   Hold losartan/triamterene/hctz in the post operative setting to avoid hypotension/ABILIO   OK to resume on dc   Add hydralazine as needed for SBP >160   Monitor trend   Continue cardizem with appropriate hold parameters   stable     DM II   Hgb A1c 8 2   Home meds: lantus 40 units SQ qhs/glimepiride daily   Here: lantus 40u qhs/sliding scale   Cont DM diet   Will hold on glimepiride    BS stable     ABILIO   Baseline creatinine 1 0-1 2   Peaked at 2 5 on admission   Currently 1 98   Appreciate nephrology input   NS @ 75cc/hr   Urine studies ordered by renal   Avoid nephrotoxic medications   vanco to be adjusted by pharmacy   Hold losartan/traimterene/hctz     Thrombocytopenia   Baseline platelets 41-17 range   Currently 130   Monitor cbc   Repeat cbc in am     ETOH cirrhosis   No longer uses ETOH   F/b GI           PRE-OP HGB LEVEL: 13 4  The above assessment and plan was reviewed and updated as determined by my evaluation of the patient on 5/2/2023      Labs:   Results from last 7 days   Lab Units 05/01/23  1143   WBC Thousand/uL 5 95   HEMOGLOBIN g/dL 13 4   HEMATOCRIT % 41 6   PLATELETS Thousands/uL 130*     Results from last 7 days   Lab Units 05/02/23  8385 05/01/23  1809   SODIUM mmol/L 136 133*   POTASSIUM mmol/L 4 3 4 2   CHLORIDE mmol/L 107 106   CO2 mmol/L 23 25   BUN mg/dL 44* 52*   CREATININE mg/dL 1 98* 2 36*   CALCIUM mg/dL 9 5 9 6         Results from last 7 days   Lab Units 05/01/23  1143   INR  1 07     Results from last 7 days   Lab Units 05/02/23  0805 05/01/23  1712   POC GLUCOSE mg/dl 103 186*       Review of Scheduled Meds:  Current Facility-Administered Medications   Medication Dose Route Frequency Provider Last Rate    acetaminophen  650 mg Oral Q4H PRN Malia Avina MD      diltiazem  180 mg Oral Daily [facility], CRNP      hydrALAZINE  25 mg Oral Q8H PRN Yury Oliveira, AMOR      HYDROmorphone  0 2 mg Intravenous Q4H PRN Malia Avina MD      insulin glargine  40 Units Subcutaneous HS [facility], CRNP      insulin lispro  1-5 Units Subcutaneous TID AC Virginia Kunz, CRNP      insulin lispro  1-5 Units Subcutaneous HS [facility], CRNP      multi-electrolyte  100 mL/hr Intravenous Continuous Sondra Pearl, CRNP 100 mL/hr (05/02/23 0019)    naloxone  0 04 mg Intravenous Q1MIN PRN Malia Avina MD      ondansetron  4 mg Intravenous Q4H PRN Malia Avina MD      oxyCODONE  5 mg Oral Q4H PRN Malia Avina MD      oxyCODONE  2 5 mg Oral Q4H PRN Malia Avina MD      senna-docusate sodium  1 tablet Oral HS Malia Avina MD      traZODone  50 mg Oral HS [facility], LEELEENP      vancomycin  1,000 mg Intravenous PRN Kevin Awad MD         Subjective/ HPI: Miguelito Bautista seen and examined  Patient's overnight issues or events were reviewed with nursing or staff during rounds or morning huddle session  New or overnight issues include the following:     Pt without any overnight events or reported nursing issues other than some drainage from the surgical incision site  Ready for OR today      ROS:   A 10 point ROS was performed; negative except as noted above         Imaging: US kidney and bladder   Final Result by Elie Nichols DO (05/01 1715)      No hydronephrosis  Enlarged prostate  Splenomegaly  Workstation performed: [id number]         XR chest portable   Final Result by Leidy Griffiths MD (05/01 7982)      No acute cardiopulmonary disease  Workstation performed: [id number]             *Labs /Radiology studies Reviewed  *Medications  reviewed and reconciled as needed  *Please refer to order section for additional ordered labs studies  *Case discussed with primary attending during morning huddle case rounds    Physical Examination:  Vitals:   Vitals:    05/01/23 1554 05/01/23 2254 05/01/23 2255 05/02/23 0804   BP: 134/65  111/68 124/57   BP Location:       Pulse: 61 92 93 71   Resp: 18   17   Temp: 97 9 °F (36 6 °C)  98 °F (36 7 °C) 97 8 °F (36 6 °C)   TempSrc:       SpO2: 98% 97% 96% 97%       GEN: No apparent distress, interactive  NEURO: Alert and oriented x3  HEENT: Pupils are equal and reactive, EOMI, mucous membranes are moist, face symmetrical  CV: S1 S2 regular, no MRG, no peripheral edema noted  RESP: Lungs are clear bilaterally, no wheezes, rales or rhonchi noted, on room air, respirations easy and non labored  GI: obese, soft non tender, non distended; +BS x4  : Voiding without difficulty  MUSC: Moves all extremities; except LUE in sling  SKIN: pink, warm and dry, normal turgor, incision with upper portion serous drainage; PVD discoloration b/l LE         The above physical exam was reviewed and updated as determined by my evaluation of the patient on 5/2/2023      Invasive Devices     Peripheral Intravenous Line  Duration           Peripheral IV 05/01/23 Right;Ventral (anterior) Forearm <1 day                   VTE Pharmacologic Prophylaxis: Reason for no pharmacologic prophylaxis ambulatory  Code Status: Level 1 - Full Code  Current Length of Stay: 1 day(s)      Total floor / unit time spent today 30 minutes  Coordination of patient's care was performed in conjunction with primary service  Time invested included review of patient's labs, vitals, and management of their comorbidities with continued monitoring, examination of patient as well as answering patient questions, documenting her findings and creating progress note in electronic medical record,  ordering appropriate diagnostic testing  Medical decision making for the day was made by supervising physician unless otherwise noted in their attestation statement  ** Please Note:  voice to text software may have been used in the creation of this document   Although proof errors in transcription or interpretation are a potential of such software**

## 2023-05-02 NOTE — PROGRESS NOTES
Progress Note - Orthopedics   Carlos Tesfaye 69 y o  male MRN: [id number]  Unit/Bed#: [facility] 622-01      Subjective:    67 y o male  No acute events, no new complaints  Patient stable  Pain well controlled  Denies fevers, chills, CP, SOB, N/V, numbness or tingling  Patient has been NPO at midnight and is anticipating OR today      Labs:  0   Lab Value Date/Time    HCT 41 6 05/01/2023 1143    HCT 34 6 (L) 04/05/2023 0617    HCT 40 4 04/04/2023 0853    HGB 13 4 05/01/2023 1143    HGB 11 2 (L) 04/05/2023 0617    HGB 13 1 04/04/2023 0853    INR 1 07 05/01/2023 1143    WBC 5 95 05/01/2023 1143    WBC 5 17 04/05/2023 0617    WBC 3 91 (L) 04/04/2023 0853     (H) 05/01/2023 1143    CRP 52 6 (H) 05/01/2023 1143       Meds:    Current Facility-Administered Medications:     acetaminophen (TYLENOL) tablet 650 mg, 650 mg, Oral, Q4H PRN, Moise Simmonds, MD    diltiazem (CARDIZEM CD) 24 hr capsule 180 mg, 180 mg, Oral, Daily, AMOR Hughes    hydrALAZINE (APRESOLINE) tablet 25 mg, 25 mg, Oral, Q8H PRN, AMOR Hughes    HYDROmorphone HCl (DILAUDID) injection 0 2 mg, 0 2 mg, Intravenous, Q4H PRN, Moise Simmonds, MD  [address] [place] [address] ON 5/2/2023] insulin glargine (LANTUS) subcutaneous injection 40 Units 0 4 mL, 40 Units, Subcutaneous, HSVirginia CRNP    insulin lispro (HumaLOG) 100 units/mL subcutaneous injection 1-5 Units, 1-5 Units, Subcutaneous, TID AC, 1 Units at 05/01/23 1817 **AND** Fingerstick Glucose (POCT), , , TID ACVirginia CRNP    insulin lispro (HumaLOG) 100 units/mL subcutaneous injection 1-5 Units, 1-5 Units, Subcutaneous, HS, Virginia Kunz, LEELEENP, 1 Units at 05/01/23 2136    multi-electrolyte (PLASMALYTE-A/ISOLYTE-S PH 7 4) IV solution, 100 mL/hr, Intravenous, Continuous, AMOR Gudino, Last Rate: 100 mL/hr at 05/01/23 1429, 100 mL/hr at 05/01/23 1429    naloxone (NARCAN) 0 04 mg/mL syringe 0 04 mg, 0 04 mg, Intravenous, Q1MIN PRN, Moise Simmonds, MD    ondansetron [facility]) injection 4 mg, 4 mg, Intravenous, Q4H PRN, Iain Lynn MD    oxyCODONE (ROXICODONE) IR tablet 5 mg, 5 mg, Oral, Q4H PRN, Iain Lynn MD    oxyCODONE (ROXICODONE) split tablet 2 5 mg, 2 5 mg, Oral, Q4H PRN, Iain Lynn MD    senna-docusate sodium (SENOKOT S) 8 6-50 mg per tablet 1 tablet, 1 tablet, Oral, HS, Iain Lynn MD    traZODone (DESYREL) tablet 50 mg, 50 mg, Oral, HS, AMOR Cantrell, 50 mg at 05/01/23 2136    vancomycin (VANCOCIN) IVPB (premix in dextrose) 1,000 mg 200 mL, 1,000 mg, Intravenous, PRN, Patience Naidu MD    Blood Culture:   Lab Results   Component Value Date    BLOODCX No Growth After 5 Days  09/13/2022    BLOODCX No Growth After 5 Days  09/13/2022       Wound Culture:   Lab Results   Component Value Date    WOUNDCULT 1+ Growth of Klebsiella oxytoca (A) 09/14/2022    WOUNDCULT 1+ Growth of 09/14/2022       Ins and Outs:  I/O last 24 hours: In: 731 7 [P O :360; I V :371 7]  Out: -           Physical:  Vitals:    05/01/23 1554   BP: 134/65   Pulse: 61   Resp: 18   Temp: 97 9 °F (36 6 °C)   SpO2: 98%     Musculoskeletal: left Upper Extremity  · Skin intact with superficial surgical site incision at anterior aspect of left shoulder  · Dressing saturated w yellow drainage  · TTP surgical site  · Sensation intact to median/radial/ulnar nerve distribution   · Motor intact anterior interosseous nerve/posterior interosseous nerve/median/radial/ulnar nerve distributions  · 2+ radial pulse, symmetric bilaterally  · Digits warm and well perfused  · Capillary refill < 2 seconds    Assessment:    67 y o male with left rTSA superficial surgical site infection  Patient stable  Pending OR today      Plan:  · NWB LUE in sling  · IV vanco  Dosing per pharmacy  · Appreciate nephro recommendations  · To OR today for superficial washout of surgical site - maintain NPO and hold morning AC/AP  · Will monitor for ABLA and administer IVF/prbc as indicated for Greater than 2 gram drop or Hgb < 7  · PT/OT  · Pain control  · DVT ppx SCDs  · Appreciate medical comanagement  · Dispo: Ortho will follow    Enrique Cooney MD

## 2023-05-03 LAB
ANION GAP SERPL CALCULATED.3IONS-SCNC: 3 MMOL/L (ref 4–13)
BASOPHILS # BLD AUTO: 0 THOUSANDS/ÂΜL (ref 0–0.1)
BASOPHILS NFR BLD AUTO: 0 % (ref 0–1)
BUN SERPL-MCNC: 39 MG/DL (ref 5–25)
CALCIUM SERPL-MCNC: 9.5 MG/DL (ref 8.3–10.1)
CHLORIDE SERPL-SCNC: 106 MMOL/L (ref 96–108)
CO2 SERPL-SCNC: 26 MMOL/L (ref 21–32)
CREAT SERPL-MCNC: 1.59 MG/DL (ref 0.6–1.3)
EOSINOPHIL # BLD AUTO: 0 THOUSAND/ÂΜL (ref 0–0.61)
EOSINOPHIL NFR BLD AUTO: 0 % (ref 0–6)
ERYTHROCYTE [DISTWIDTH] IN BLOOD BY AUTOMATED COUNT: 13.1 % (ref 11.6–15.1)
GFR SERPL CREATININE-BSD FRML MDRD: 43 ML/MIN/1.73SQ M
GLUCOSE SERPL-MCNC: 183 MG/DL (ref 65–140)
GLUCOSE SERPL-MCNC: 234 MG/DL (ref 65–140)
GLUCOSE SERPL-MCNC: 283 MG/DL (ref 65–140)
GLUCOSE SERPL-MCNC: 312 MG/DL (ref 65–140)
HCT VFR BLD AUTO: 39 % (ref 36.5–49.3)
HGB BLD-MCNC: 12.3 G/DL (ref 12–17)
IMM GRANULOCYTES # BLD AUTO: 0.02 THOUSAND/UL (ref 0–0.2)
IMM GRANULOCYTES NFR BLD AUTO: 0 % (ref 0–2)
LYMPHOCYTES # BLD AUTO: 0.44 THOUSANDS/ÂΜL (ref 0.6–4.47)
LYMPHOCYTES NFR BLD AUTO: 8 % (ref 14–44)
MCH RBC QN AUTO: 28.2 PG (ref 26.8–34.3)
MCHC RBC AUTO-ENTMCNC: 31.5 G/DL (ref 31.4–37.4)
MCV RBC AUTO: 89 FL (ref 82–98)
MONOCYTES # BLD AUTO: 0.2 THOUSAND/ÂΜL (ref 0.17–1.22)
MONOCYTES NFR BLD AUTO: 4 % (ref 4–12)
NEUTROPHILS # BLD AUTO: 4.94 THOUSANDS/ÂΜL (ref 1.85–7.62)
NEUTS SEG NFR BLD AUTO: 88 % (ref 43–75)
NRBC BLD AUTO-RTO: 0 /100 WBCS
PLATELET # BLD AUTO: 102 THOUSANDS/UL (ref 149–390)
PMV BLD AUTO: 12.3 FL (ref 8.9–12.7)
POTASSIUM SERPL-SCNC: 4.5 MMOL/L (ref 3.5–5.3)
RBC # BLD AUTO: 4.36 MILLION/UL (ref 3.88–5.62)
SODIUM SERPL-SCNC: 135 MMOL/L (ref 135–147)
WBC # BLD AUTO: 5.6 THOUSAND/UL (ref 4.31–10.16)

## 2023-05-03 RX ORDER — INSULIN LISPRO 100 [IU]/ML
1-6 INJECTION, SOLUTION INTRAVENOUS; SUBCUTANEOUS
Status: DISCONTINUED | OUTPATIENT
Start: 2023-05-04 | End: 2023-05-05 | Stop reason: HOSPADM

## 2023-05-03 RX ORDER — INSULIN LISPRO 100 [IU]/ML
1-5 INJECTION, SOLUTION INTRAVENOUS; SUBCUTANEOUS
Status: DISCONTINUED | OUTPATIENT
Start: 2023-05-03 | End: 2023-05-05 | Stop reason: HOSPADM

## 2023-05-03 RX ORDER — GLIMEPIRIDE 2 MG/1
4 TABLET ORAL
Status: DISCONTINUED | OUTPATIENT
Start: 2023-05-03 | End: 2023-05-05 | Stop reason: HOSPADM

## 2023-05-03 RX ADMIN — CEFEPIME 2000 MG: 2 INJECTION, POWDER, FOR SOLUTION INTRAVENOUS at 04:47

## 2023-05-03 RX ADMIN — INSULIN LISPRO 2 UNITS: 100 INJECTION, SOLUTION INTRAVENOUS; SUBCUTANEOUS at 17:37

## 2023-05-03 RX ADMIN — VANCOMYCIN HYDROCHLORIDE 1250 MG: 10 INJECTION, POWDER, LYOPHILIZED, FOR SOLUTION INTRAVENOUS at 13:38

## 2023-05-03 RX ADMIN — SENNOSIDES AND DOCUSATE SODIUM 1 TABLET: 8.6; 5 TABLET ORAL at 23:23

## 2023-05-03 RX ADMIN — INSULIN LISPRO 4 UNITS: 100 INJECTION, SOLUTION INTRAVENOUS; SUBCUTANEOUS at 12:35

## 2023-05-03 RX ADMIN — INSULIN GLARGINE 40 UNITS: 100 INJECTION, SOLUTION SUBCUTANEOUS at 23:23

## 2023-05-03 RX ADMIN — CEFEPIME 2000 MG: 2 INJECTION, POWDER, FOR SOLUTION INTRAVENOUS at 17:37

## 2023-05-03 RX ADMIN — TRAZODONE HYDROCHLORIDE 50 MG: 50 TABLET ORAL at 23:23

## 2023-05-03 RX ADMIN — INSULIN LISPRO 1 UNITS: 100 INJECTION, SOLUTION INTRAVENOUS; SUBCUTANEOUS at 23:23

## 2023-05-03 NOTE — CONSULTS
Peripheral Nerve Block Follow-up Note - Acute Pain Service    Greta Chamberlain 69 y o  male MRN: [id number]  Unit/Bed#: [facility] 622-01 Encounter: [id number]      Assessment:   Principal Problem:    Cellulitis  Active Problems:    S/P reverse total shoulder arthroplasty, left    Greta Chamberlain is a 69y o  year old male with a past medical history significant for insulin-dependent type 2 diabetes mellitus with most recent A1c 8 4%, MEG on CPAP who presents with left shoulder surgical site infection  Patient underwent left total shoulder arthroplasty on 4/4/2023 and developed drainage at site  He underwent incision and drainage on 5/2/2023  Patient has been evaluated by infectious disease and he is on intravenous antibiotics  Patient underwent irrigation and debridement of the left shoulder on 1/7/3882 without complication  Of note, patient with acute kidney injury which appears to be improving  Patient received single shot left interscalene peripheral nerve block with liposomal bupivacaine for postoperative pain management  Acute pain service has been consulted for follow-up of peripheral nerve block  Plan:   Left interscalene block is functioning appropriately with expected sensory and motor deficits   Continue acetaminophen 650 mg p o  every 4 hours as needed for mild pain  Continue oxycodone 2 5 mg p o  every 4 hours as needed for moderate pain  Continue oxycodone 5 mg p o  every 4 hours as needed for severe pain  Discontinue intravenous hydromorphone  Continue naloxone as needed for opioid reversal/respiratory depression  Renally dose medications based on renal function   Estimated Creatinine Clearance: 43 9 mL/min (A) (by C-G formula based on SCr of 1 98 mg/dL (H))  Bowel regimen per primary team to avoid opioid-induced constipation    APS will sign off at this time  Thank you for the consult   All opioids and other analgesics to be written at discretion of primary team  Please contact Acute Pain Service - [facility] via [employer] from [phone] with additional questions or concerns  See SeeMebandar or Mann for additional contacts and after hours information  Pain History  Current pain location(s): No pain  Pain Scale:   No pain  Quality: No pain  24 hour history: Patient underwent procedures as above  Patient is remained hemodynamically stable and afebrile  Patient did require supplemental oxygen overnight  Most recent laboratory studies significant for thrombocytopenia with platelets 807, creatinine 1 98 with EGFR 33 (creatinine clearance 43 9)  Most recent liver enzymes from 3/20/2023 with AST 44, ALT 56, alkaline phosphatase 132, albumin 3 8, total bilirubin 0 90  Opioid requirement previous 24 hours:   Fentanyl 50 mcg IV    Meds/Allergies   all current active meds have been reviewed    Allergies   Allergen Reactions    Sulfa Antibiotics Hives       Objective     Temp:  [97 6 °F (36 4 °C)-98 2 °F (36 8 °C)] 97 7 °F (36 5 °C)  HR:  [72-95] 85  Resp:  [14-19] 18  BP: ()/(57-74) 126/72    Physical Exam  Vitals and nursing note reviewed  Constitutional:       General: He is not in acute distress  Appearance: Normal appearance  He is not ill-appearing or toxic-appearing  HENT:      Head: Normocephalic and atraumatic  Eyes:      General: No scleral icterus  Conjunctiva/sclera: Conjunctivae normal    Cardiovascular:      Rate and Rhythm: Normal rate  Pulmonary:      Effort: Pulmonary effort is normal  No respiratory distress  Musculoskeletal:         General: No tenderness  Skin:     General: Skin is warm and dry  Neurological:      Mental Status: He is alert  Comments: Awake, alert and oriented to person place time situation  POSS 1  Sensory and motor deficits noted in interscalene nerve distribution   Psychiatric:         Thought Content:  Thought content normal            Lab Results:   Results from last 7 days   Lab Units 05/03/23  0914   WBC Thousand/uL 5 60   HEMOGLOBIN g/dL 12 3 HEMATOCRIT % 39 0   PLATELETS Thousands/uL 102*      Results from last 7 days   Lab Units 05/02/23  0552   POTASSIUM mmol/L 4 3   CHLORIDE mmol/L 107   CO2 mmol/L 23   BUN mg/dL 44*   CREATININE mg/dL 1 98*   CALCIUM mg/dL 9 5       Imaging Studies: I have personally reviewed pertinent reports  EKG, Pathology, and Other Studies: I have personally reviewed pertinent reports  Please note that the APS provides consultative services regarding pain management only  With the exception of ketamine, peripheral nerve catheters, and epidural infusions (and except when indicated), final decisions regarding starting or changing doses of analgesic medications are at the discretion of the consulting service  Off hours consultation and/or medication management is generally not available      John Gutierrez MD  Acute Pain Service

## 2023-05-03 NOTE — PROGRESS NOTES
Carlos Tesfaye is a 69 y o  male who is currently ordered Vancomycin IV with management by the Pharmacy Consult service  Relevant clinical data and objective / subjective history reviewed  Vancomycin Assessment:  Indication and Goal AUC/Trough: Soft tissue (goal -600, trough >10), -600, trough >10  Clinical Status: stable  Micro:     Renal Function:  SCr: 1 6 mg/dL  CrCl: 55 mL/min  Renal replacement: Not on dialysis  Days of Therapy: 3  Current Dose: 1 gm q24  Vancomycin Plan:  New Dosinmg q24  Estimated AUC: 473 mcg*hr/mL  Estimated Trough: 13 8 mcg/mL  Next Level: amdraw 5-9  Renal Function Monitoring: Daily BMP and UOP  Pharmacy will continue to follow closely for s/sx of nephrotoxicity, infusion reactions and appropriateness of therapy  BMP and CBC will be ordered per protocol  We will continue to follow the patients culture results and clinical progress daily      Alfredo Zaragoza, Pharmacist

## 2023-05-03 NOTE — PROGRESS NOTES
Progress Note - Infectious Disease   Chelsi Handley 69 y o  male MRN: [id number]  Unit/Bed#: [facility] 622-01 Encounter: [id number]    Impression/Plan:  1  Left shoulder surgical site infection  Patient is s/p total shoulder arthroplasty on 4/4/2023  He then developed drainage from the site  Patient underwent I&D of the shoulder wound on 5/2/2023 with no deep collection found  Still must consider the possibility of an evolving joint infection although operatively not consistent with this  Operative culture are pending, no growth on Gram stain  Fortunately the patient is not systemically ill  He has been receiving IV cefepime and IV vancomycin and has been tolerating antibiotics without difficulty  I will continue this regimen for now while we await updated OR cultures  -continue IV cefepime  -continue IV vancomycin  -continue pharmacy consult for guidance on vancomycin dosage  -monitor CBCD and BMP  -follow up operative cultures  -monitor vitals  -serial L shoulder exams  -surgical site/VAC care per orthopedic surgery  -continue follow up with orthopedic surgery    2  ABILIO  Likely multifactorial including pre-renal issues  Creatinine appears to have peaked at 2 56  It has been trending down  He is following with nephrology  -monitor creatinine  -dose adjust antibiotics for renal function as needed  -avoid nephrotoxins  -continue follow up with nephrology     3  Type 2 diabetes mellitus with long term insulin use  Patient's last HbA1c was 8 4% on 3/202/2023  Elevated blood glucose is risk factor for infection and complications with healing  Patient concerned that his blood glucose was >400 this morning  [facility] is consulted for blood glucose management   -blood glucose management per [facility]    4  Obesity  BMI = 35 24    -dose adjust antibiotics for patient weight as needed     Above plan was discussed in detail with patient at the bedside    Above plan was discussed in detail with orthopedic surgery  Antibiotics:  Vancomycin 3  Cefepime 2  Antibiotics 3    Subjective:  Patient reports he's feeling well today  Reports his nerve block is still active  Is able to move his L hand but not the arm yet  Is not having any L shoulder pain and was able to get good rest last night  He has no fever, chills, sweats, shakes; no nausea, vomiting, abdominal pain, diarrhea, or dysuria; no cough, shortness of breath, or chest pain  No new symptoms  Objective:  Vitals:  Temp:  [97 6 °F (36 4 °C)-98 2 °F (36 8 °C)] 97 7 °F (36 5 °C)  HR:  [71-95] 85  Resp:  [14-19] 16  BP: ()/(57-74) 125/70  SpO2:  [91 %-100 %] 95 %  Temp (24hrs), Av 8 °F (36 6 °C), Min:97 6 °F (36 4 °C), Max:98 2 °F (36 8 °C)  Current: Temperature: 97 7 °F (36 5 °C)    Physical Exam:   General Appearance:  Alert, interactive, nontoxic, in no acute distress  He appears comfortable sitting out of bed in the chair  He is wearing his glasses  Throat: Oropharynx moist without lesions  Lungs:   Clear to auscultation bilaterally; no wheezes, rhonchi or rales; respirations unlabored on room air  Heart:  RRR; no murmur, rub or gallop  Abdomen:   Soft, obese, non-tender, non-distended, positive bowel sounds  Extremities: L fingers/wrist with ROM but elbow/shoulder still under nerve block  L shoulder VAC intact without leakage, no spreading erythema at sponge site, periwound tissue is soft and not hot to touch  L arm remains in sling  Skin: No new rashes noted on exposed skin       Labs, Imaging, & Other studies:   All pertinent labs and imaging studies were personally reviewed  Results from last 7 days   Lab Units 23  1143   WBC Thousand/uL 5 95   HEMOGLOBIN g/dL 13 4   PLATELETS Thousands/uL 130*     Results from last 7 days   Lab Units 23  0552   POTASSIUM mmol/L 4 3   CHLORIDE mmol/L 107   CO2 mmol/L 23   BUN mg/dL 44*   CREATININE mg/dL 1 98*   EGFR ml/min/1 73sq m 33   CALCIUM mg/dL 9 5     Results from last 7 days Lab Units 05/02/23  1410   GRAM STAIN RESULT  3+ Polys  No organisms seen

## 2023-05-03 NOTE — PROGRESS NOTES
Progress Note - Orthopedics   Othelia Hockey 69 y o  male MRN: [id number]  [facility]      Subjective:    67 y o male POD 2 L shoulder superficial I+D  Intraoperatively, superficial area of purulence and fat necrosis encountered at surgical site, no evidence of deep infection  Cultures pending  ID consulted on board for antibiotic selection  No acute events, no new complaints  Patient stable  Pain well controlled  Denies fevers, chills, CP, SOB, N/V, numbness or tingling       Labs:  0   Lab Value Date/Time    HCT 39 0 05/03/2023 0914    HCT 41 6 05/01/2023 1143    HCT 34 6 (L) 04/05/2023 0617    HGB 12 3 05/03/2023 0914    HGB 13 4 05/01/2023 1143    HGB 11 2 (L) 04/05/2023 0617    INR 1 07 05/01/2023 1143    WBC 5 60 05/03/2023 0914    WBC 5 95 05/01/2023 1143    WBC 5 17 04/05/2023 0617     (H) 05/01/2023 1143    CRP 52 6 (H) 05/01/2023 1143       Meds:    Current Facility-Administered Medications:     acetaminophen (TYLENOL) tablet 650 mg, 650 mg, Oral, Q4H PRN, Tonia Winchester PA-C    cefepime (MAXIPIME) 2 g/50 mL dextrose IVPB, 2,000 mg, Intravenous, Q12H, [employer], CRNP, Last Rate: 100 mL/hr at 05/03/23 1737, 2,000 mg at 05/03/23 1737    diltiazem (CARDIZEM CD) 24 hr capsule 180 mg, 180 mg, Oral, Daily, Tonia Winchester PA-C    glimepiride (AMARYL) tablet 4 mg, 4 mg, Oral, Daily With Breakfast, Alonso Aase, CRNP    hydrALAZINE (APRESOLINE) tablet 25 mg, 25 mg, Oral, Q8H PRN, Tonia Winchester PA-C    insulin glargine (LANTUS) subcutaneous injection 40 Units 0 4 mL, 40 Units, Subcutaneous, HS, Tonia Winchester PA-C, 40 Units at 05/02/23 2140    insulin lispro (HumaLOG) 100 units/mL subcutaneous injection 1-5 Units, 1-5 Units, Subcutaneous, TID AC, 2 Units at 05/03/23 1737 **AND** Fingerstick Glucose (POCT), , , TID AC, Tonia Winchester PA-C    insulin lispro (HumaLOG) 100 units/mL subcutaneous injection 1-5 Units, 1-5 Units, Subcutaneous, HS, Otila Drilling Sudheeri Shaheen, PA-C, 5 Units at 05/02/23 2140    lactated ringers infusion, 20 mL/hr, Intravenous, Continuous, Dudley To MD, Last Rate: 20 mL/hr at 05/02/23 1600, 20 mL/hr at 05/02/23 1600    naloxone (NARCAN) 0 04 mg/mL syringe 0 04 mg, 0 04 mg, Intravenous, Q1MIN PRN, Maddy Frieze, PA-C    ondansetron [facility]) injection 4 mg, 4 mg, Intravenous, Q4H PRN, Maddy Frieze, PA-C    oxyCODONE (ROXICODONE) IR tablet 5 mg, 5 mg, Oral, Q4H PRN, Maddy Frieze, PA-C    oxyCODONE (ROXICODONE) split tablet 2 5 mg, 2 5 mg, Oral, Q4H PRN, Maddy Frieze, PA-C    senna-docusate sodium (SENOKOT S) 8 6-50 mg per tablet 1 tablet, 1 tablet, Oral, HS, Maddy Frieze, PA-C    traZODone (DESYREL) tablet 50 mg, 50 mg, Oral, HS, Maddy Frieze, PA-C, 50 mg at 05/02/23 2140    vancomycin (VANCOCIN) 1,250 mg in sodium chloride 0 9 % 250 mL IVPB, 1,250 mg, Intravenous, Q24H, Nikhil Christopher MD, Last Rate: 166 7 mL/hr at 05/03/23 1338, 1,250 mg at 05/03/23 1338    Blood Culture:   Lab Results   Component Value Date    BLOODCX No Growth After 5 Days  09/13/2022    BLOODCX No Growth After 5 Days  09/13/2022       Wound Culture:   Lab Results   Component Value Date    WOUNDCULT 1+ Growth of Klebsiella oxytoca (A) 09/14/2022    WOUNDCULT 1+ Growth of 09/14/2022       Ins and Outs:  I/O last 24 hours: In: 1660 [P O :960; I V :700]  Out: 0           Physical:  Vitals:    05/03/23 1444   BP: 121/67   Pulse: 98   Resp: 16   Temp: 97 8 °F (36 6 °C)   SpO2: 99%     Musculoskeletal: left Upper Extremity  · Skin intact surrounding dressing  No erythema, ecchymosis  · Incisional vac in place, set to continuous suction - removed  · Well approximated incision without any drainage  No fluctuance  Nothing expressible    · TTP celina incisionally  · Sensation intact to median/radial/ulnar nerve distribution   · Motor intact anterior interosseous nerve/posterior interosseous nerve/median/radial/ulnar nerve distributions  · 2+ radial pulse, symmetric bilaterally  · Digits warm and well perfused  · Capillary refill < 2 seconds    Assessment:    67 y o male with left rTSA superficial SSI now POD 2 from I+D  Patient doing well       Plan:  · NWB LUE in sling  · Follow up intra-operative cx  · Will hold off on PT/OT in setting of acute infection  · ID consult for abx selection - continue vanc and cefepime  · Pharmacy consult for vanco dosing  · Nephrology consult for ABILIO - will trend BMP and continue IVF  · Will monitor for ABLA and administer IVF/prbc as indicated for Greater than 2 gram drop or Hgb < 7  · Pain control  · DVT ppx SCDs  · Appreciate medical comanagement  · Dispo: Ortho will follow    Cinthya Dle Cid MD

## 2023-05-03 NOTE — PROGRESS NOTES
Progress Note - Nephrology   Christina Villar 69 y o  male MRN: [id number]  Unit/Bed#: [facility] 622-01 Encounter: [id number]    ASSESSMENT and PLAN:  Acute kidney injury (POA):  Etiology: Suspect multi- factorial:  prerenal azotemia in the setting of decreased oral intake, loss of autoregulatory system in the setting of losartan;  possible infection on Keflex-need to rule out AIN  Assessment:   After review medical records through [facility] everywhere baseline creatinine 0 9-1 2 dating back to 2020   Admission creatinine 2 56   Current creatinine 1 59-improving daily   Losartan remains on hold   OFF IVF this am  Workup:   Urinalysis with micro-centrally bland   Urine eosinophils-0%   FeNA-1 22%-suggestive of ATN   Renal ultrasound-without hydronephrosis  Lateral kidneys normal echogenicity and contour    Enlarged prostate  Plan:   Avoid hypotension or perturbations of blood pressure to prevent decreased renal perfusion   Avoid nephrotoxins, NSAIDs, IV contrast if possible   Encourage oral intake off IVF   Optimize hemodynamic status to avoid delay in renal recovery   Check BMP in am   Patient will require hospital follow-up and prefers [facility] office given the location     Blood pressure/Hypertension:  Assessment and Plan:   Current blood pressure normotensive   Home medications: Diltiazem CD1 80 mg daily, losartan 100 mg daily,   Avoid losartan at this time when ABILIO   Currently on diltiazem  80 mg daily   Maximize hemodynamics to maintain MAP >65   Avoid hypotension or fluctuations in blood pressure   Will continue to trend     Concern for shoulder infection  Assessment and Plan:   Orthopedic following   S/P I & D and wound vac   Now on IV vancomycin-ID consulted   Status post reverse total left left shoulder arthroplasty on 4/4/2023 who presents to the emergency room at shoulder arthroplasty 4/4/2023      Review of systems  Patient seen and examined at bedside:  Review of Systems Constitutional: Negative  Negative for activity change, appetite change, chills, diaphoresis, fatigue and fever  HENT: Negative  Negative for congestion and facial swelling  Respiratory: Negative  Cardiovascular: Negative  Gastrointestinal: Negative  Endocrine: Negative  Genitourinary: Negative  Musculoskeletal: Negative  Skin: Negative  Allergic/Immunologic: Negative  Neurological: Negative  Hematological: Negative  Psychiatric/Behavioral: Negative  Physical exam:  Current Weight:    Vitals:    05/02/23 2336 05/03/23 0218 05/03/23 0707 05/03/23 1115   BP: 116/72 125/70 126/72 119/68   BP Location:       Pulse: 95 85 85 94   Resp: 16  18 16   Temp: 97 7 °F (36 5 °C)   97 9 °F (36 6 °C)   TempSrc:       SpO2: 91% 95% 97% 97%       Intake/Output Summary (Last 24 hours) at 5/3/2023 1223  Last data filed at 5/3/2023 0900  Gross per 24 hour   Intake 1660 ml   Output 0 ml   Net 1660 ml       Physical Exam  Vitals and nursing note reviewed  Constitutional:       Appearance: Normal appearance  He is obese  Comments: OOB, NAD   HENT:      Head: Normocephalic and atraumatic  Nose: Nose normal       Mouth/Throat:      Mouth: Mucous membranes are moist       Pharynx: Oropharynx is clear  Eyes:      Extraocular Movements: Extraocular movements intact  Conjunctiva/sclera: Conjunctivae normal    Cardiovascular:      Rate and Rhythm: Normal rate and regular rhythm  Pulses: Normal pulses  Heart sounds: Normal heart sounds  Pulmonary:      Effort: Pulmonary effort is normal       Breath sounds: Normal breath sounds  Abdominal:      General: Bowel sounds are normal       Palpations: Abdomen is soft  Musculoskeletal:      Cervical back: Normal range of motion and neck supple  Right lower leg: Edema present  Left lower leg: Edema present  Comments: PVD discoloration bilaterally   Skin:     General: Skin is warm and dry        Comments: Left shoulder dressing now with wound vac   Neurological:      General: No focal deficit present  Mental Status: He is alert and oriented to person, place, and time  Psychiatric:         Mood and Affect: Mood normal          Behavior: Behavior normal          Thought Content:  Thought content normal          Judgment: Judgment normal             Medications:    Current Facility-Administered Medications:     acetaminophen (TYLENOL) tablet 650 mg, 650 mg, Oral, Q4H PRN, Deveron Bump, PA-C    cefepime (MAXIPIME) 2 g/50 mL dextrose IVPB, 2,000 mg, Intravenous, Q12H, [employer], AMOR, Last Rate: 100 mL/hr at 05/03/23 0447, 2,000 mg at 05/03/23 0447    diltiazem (CARDIZEM CD) 24 hr capsule 180 mg, 180 mg, Oral, Daily, Adamaeron Bump, PA-C    glimepiride (AMARYL) tablet 4 mg, 4 mg, Oral, Daily With Breakfast, Drucella Alyssa, CRNP    hydrALAZINE (APRESOLINE) tablet 25 mg, 25 mg, Oral, Q8H PRN, Deveron Bump, PA-C    insulin glargine (LANTUS) subcutaneous injection 40 Units 0 4 mL, 40 Units, Subcutaneous, HS, Deveron Bump, PA-C, 40 Units at 05/02/23 2140    insulin lispro (HumaLOG) 100 units/mL subcutaneous injection 1-5 Units, 1-5 Units, Subcutaneous, TID AC, 1 Units at 05/02/23 1740 **AND** Fingerstick Glucose (POCT), , , TID AC, Deveron Bump, PA-C    insulin lispro (HumaLOG) 100 units/mL subcutaneous injection 1-5 Units, 1-5 Units, Subcutaneous, HS, Deveron Bump, PA-C, 5 Units at 05/02/23 2140    lactated ringers infusion, 20 mL/hr, Intravenous, Continuous, Faiza Guthrie MD, Last Rate: 20 mL/hr at 05/02/23 1600, 20 mL/hr at 05/02/23 1600    naloxone (NARCAN) 0 04 mg/mL syringe 0 04 mg, 0 04 mg, Intravenous, Q1MIN PRN, Deveron Bump, PA-C    ondansetron [facility]) injection 4 mg, 4 mg, Intravenous, Q4H PRN, Deveron Bump, PA-C    oxyCODONE (ROXICODONE) IR tablet 5 mg, 5 mg, Oral, Q4H PRN, Deveron Bump, PA-C    oxyCODONE (ROXICODONE) split tablet 2 5 mg, 2 5 mg, Oral, Q4H PRN, Christie Flores PA-C    senna-docusate sodium (SENOKOT S) 8 6-50 mg per tablet 1 tablet, 1 tablet, Oral, HS, Christie Flores PA-C    traZODone (DESYREL) tablet 50 mg, 50 mg, Oral, HS, Christie Flores PA-C, 50 mg at 05/02/23 2140    vancomycin (VANCOCIN) 1,250 mg in sodium chloride 0 9 % 250 mL IVPB, 1,250 mg, Intravenous, Q24H, Alison Olson MD    Laboratory Results:  Results from last 7 days   Lab Units 05/03/23  0915 05/03/23  0914 05/02/23  0552 05/01/23  1809 05/01/23  1143   WBC Thousand/uL  --  5 60  --   --  5 95   HEMOGLOBIN g/dL  --  12 3  --   --  13 4   HEMATOCRIT %  --  39 0  --   --  41 6   PLATELETS Thousands/uL  --  102*  --   --  130*   SODIUM mmol/L 135  --  136 133* 134*   POTASSIUM mmol/L 4 5  --  4 3 4 2 4 6   CHLORIDE mmol/L 106  --  107 106 107   CO2 mmol/L 26  --  23 25 23   BUN mg/dL 39*  --  44* 52* 51*   CREATININE mg/dL 1 59*  --  1 98* 2 36* 2 56*   CALCIUM mg/dL 9 5  --  9 5 9 6 9 8

## 2023-05-04 ENCOUNTER — TELEPHONE (OUTPATIENT)
Dept: NEPHROLOGY | Facility: CLINIC | Age: 68
End: 2023-05-04

## 2023-05-04 ENCOUNTER — APPOINTMENT (OUTPATIENT)
Dept: PHYSICAL THERAPY | Facility: CLINIC | Age: 68
End: 2023-05-04
Payer: MEDICARE

## 2023-05-04 ENCOUNTER — TELEPHONE (OUTPATIENT)
Dept: PHYSICAL THERAPY | Facility: CLINIC | Age: 68
End: 2023-05-04

## 2023-05-04 LAB
ANION GAP SERPL CALCULATED.3IONS-SCNC: 1 MMOL/L (ref 4–13)
BASOPHILS # BLD AUTO: 0.02 THOUSANDS/ÂΜL (ref 0–0.1)
BASOPHILS NFR BLD AUTO: 0 % (ref 0–1)
BUN SERPL-MCNC: 35 MG/DL (ref 5–25)
CALCIUM SERPL-MCNC: 9 MG/DL (ref 8.3–10.1)
CHLORIDE SERPL-SCNC: 108 MMOL/L (ref 96–108)
CO2 SERPL-SCNC: 28 MMOL/L (ref 21–32)
CREAT SERPL-MCNC: 1.28 MG/DL (ref 0.6–1.3)
EOSINOPHIL # BLD AUTO: 0.01 THOUSAND/ÂΜL (ref 0–0.61)
EOSINOPHIL NFR BLD AUTO: 0 % (ref 0–6)
ERYTHROCYTE [DISTWIDTH] IN BLOOD BY AUTOMATED COUNT: 13 % (ref 11.6–15.1)
GFR SERPL CREATININE-BSD FRML MDRD: 56 ML/MIN/1.73SQ M
GLUCOSE SERPL-MCNC: 115 MG/DL (ref 65–140)
GLUCOSE SERPL-MCNC: 122 MG/DL (ref 65–140)
GLUCOSE SERPL-MCNC: 124 MG/DL (ref 65–140)
GLUCOSE SERPL-MCNC: 135 MG/DL (ref 65–140)
GLUCOSE SERPL-MCNC: 175 MG/DL (ref 65–140)
HCT VFR BLD AUTO: 38.2 % (ref 36.5–49.3)
HGB BLD-MCNC: 12.1 G/DL (ref 12–17)
IMM GRANULOCYTES # BLD AUTO: 0.01 THOUSAND/UL (ref 0–0.2)
IMM GRANULOCYTES NFR BLD AUTO: 0 % (ref 0–2)
LYMPHOCYTES # BLD AUTO: 0.67 THOUSANDS/ÂΜL (ref 0.6–4.47)
LYMPHOCYTES NFR BLD AUTO: 12 % (ref 14–44)
MCH RBC QN AUTO: 28.1 PG (ref 26.8–34.3)
MCHC RBC AUTO-ENTMCNC: 31.7 G/DL (ref 31.4–37.4)
MCV RBC AUTO: 89 FL (ref 82–98)
MONOCYTES # BLD AUTO: 0.29 THOUSAND/ÂΜL (ref 0.17–1.22)
MONOCYTES NFR BLD AUTO: 5 % (ref 4–12)
NEUTROPHILS # BLD AUTO: 4.53 THOUSANDS/ÂΜL (ref 1.85–7.62)
NEUTS SEG NFR BLD AUTO: 83 % (ref 43–75)
NRBC BLD AUTO-RTO: 0 /100 WBCS
PLATELET # BLD AUTO: 93 THOUSANDS/UL (ref 149–390)
PMV BLD AUTO: 11.6 FL (ref 8.9–12.7)
POTASSIUM SERPL-SCNC: 4 MMOL/L (ref 3.5–5.3)
RBC # BLD AUTO: 4.31 MILLION/UL (ref 3.88–5.62)
SODIUM SERPL-SCNC: 137 MMOL/L (ref 135–147)
WBC # BLD AUTO: 5.53 THOUSAND/UL (ref 4.31–10.16)

## 2023-05-04 RX ORDER — GLIMEPIRIDE 2 MG/1
4 TABLET ORAL
Status: DISCONTINUED | OUTPATIENT
Start: 2023-05-04 | End: 2023-05-05 | Stop reason: HOSPADM

## 2023-05-04 RX ADMIN — CEFEPIME 2000 MG: 2 INJECTION, POWDER, FOR SOLUTION INTRAVENOUS at 17:37

## 2023-05-04 RX ADMIN — SENNOSIDES AND DOCUSATE SODIUM 1 TABLET: 8.6; 5 TABLET ORAL at 21:53

## 2023-05-04 RX ADMIN — VANCOMYCIN HYDROCHLORIDE 1250 MG: 10 INJECTION, POWDER, LYOPHILIZED, FOR SOLUTION INTRAVENOUS at 13:36

## 2023-05-04 RX ADMIN — GLIMEPIRIDE 4 MG: 2 TABLET ORAL at 08:26

## 2023-05-04 RX ADMIN — INSULIN LISPRO 1 UNITS: 100 INJECTION, SOLUTION INTRAVENOUS; SUBCUTANEOUS at 11:49

## 2023-05-04 RX ADMIN — GLIMEPIRIDE 4 MG: 2 TABLET ORAL at 17:37

## 2023-05-04 RX ADMIN — CEFEPIME 2000 MG: 2 INJECTION, POWDER, FOR SOLUTION INTRAVENOUS at 05:50

## 2023-05-04 RX ADMIN — TRAZODONE HYDROCHLORIDE 50 MG: 50 TABLET ORAL at 21:53

## 2023-05-04 RX ADMIN — INSULIN GLARGINE 40 UNITS: 100 INJECTION, SOLUTION SUBCUTANEOUS at 21:53

## 2023-05-04 NOTE — PLAN OF CARE
Problem: PAIN - ADULT  Goal: Verbalizes/displays adequate comfort level or baseline comfort level  Description: Interventions:  - Encourage patient to monitor pain and request assistance  - Assess pain using appropriate pain scale  - Administer analgesics based on type and severity of pain and evaluate response  - Implement non-pharmacological measures as appropriate and evaluate response  - Consider cultural and social influences on pain and pain management  - Notify physician/advanced practitioner if interventions unsuccessful or patient reports new pain  Outcome: Progressing     Problem: INFECTION - ADULT  Goal: Absence or prevention of progression during hospitalization  Description: INTERVENTIONS:  - Assess and monitor for signs and symptoms of infection  - Monitor lab/diagnostic results  - Monitor all insertion sites, i e  indwelling lines, tubes, and drains  - Monitor endotracheal if appropriate and nasal secretions for changes in amount and color  - [place] appropriate cooling/warming therapies per order  - Administer medications as ordered  - Instruct and encourage patient and family to use good hand hygiene technique  - Identify and instruct in appropriate isolation precautions for identified infection/condition  Outcome: Progressing  Goal: Absence of fever/infection during neutropenic period  Description: INTERVENTIONS:  - Monitor WBC    Outcome: Progressing     Problem: SAFETY ADULT  Goal: Patient will remain free of falls  Description: INTERVENTIONS:  - Educate patient/family on patient safety including physical limitations  - Instruct patient to call for assistance with activity   - Consult OT/PT to assist with strengthening/mobility   - Keep Call bell within reach  - Keep bed low and locked with side rails adjusted as appropriate  - Keep care items and personal belongings within reach  - Initiate and maintain comfort rounds  - Make Fall Risk Sign visible to staff  - Offer Toileting every 2 Hours, in advance of need  - Initiate/Maintain bed alarm  - Obtain necessary fall risk management equipment:   - Apply yellow socks and bracelet for high fall risk patients  - Consider moving patient to room near nurses station  Outcome: Progressing  Goal: Maintain or return to baseline ADL function  Description: INTERVENTIONS:  -  Assess patient's ability to carry out ADLs; assess patient's baseline for ADL function and identify physical deficits which impact ability to perform ADLs (bathing, care of mouth/teeth, toileting, grooming, dressing, etc )  - Assess/evaluate cause of self-care deficits   - Assess range of motion  - Assess patient's mobility; develop plan if impaired  - Assess patient's need for assistive devices and provide as appropriate  - Encourage maximum independence but intervene and supervise when necessary  - Involve family in performance of ADLs  - Assess for home care needs following discharge   - Consider OT consult to assist with ADL evaluation and planning for discharge  - Provide patient education as appropriate  Outcome: Progressing  Goal: Maintains/Returns to pre admission functional level  Description: INTERVENTIONS:  - Perform BMAT or MOVE assessment daily    - Set and communicate daily mobility goal to care team and patient/family/caregiver  - Collaborate with rehabilitation services on mobility goals if consulted  - Perform Range of Motion 3 times a day  - Reposition patient every 2 hours    - Dangle patient 3 times a day  - Stand patient 3 times a day  - Ambulate patient 3 times a day  - Out of bed to chair 3 times a day   - Out of bed for meals 3 times a day  - Out of bed for toileting  - Record patient progress and toleration of activity level   Outcome: Progressing     Problem: DISCHARGE PLANNING  Goal: Discharge to home or other facility with appropriate resources  Description: INTERVENTIONS:  - Identify barriers to discharge w/patient and caregiver  - Arrange for needed discharge resources and transportation as appropriate  - Identify discharge learning needs (meds, wound care, etc )  - Arrange for interpretive services to assist at discharge as needed  - Refer to Case Management Department for coordinating discharge planning if the patient needs post-hospital services based on physician/advanced practitioner order or complex needs related to functional status, cognitive ability, or social support system  Outcome: Progressing     Problem: Knowledge Deficit  Goal: Patient/family/caregiver demonstrates understanding of disease process, treatment plan, medications, and discharge instructions  Description: Complete learning assessment and assess knowledge base    Interventions:  - Provide teaching at level of understanding  - Provide teaching via preferred learning methods  Outcome: Progressing

## 2023-05-04 NOTE — PROGRESS NOTES
Progress Note - Infectious Disease   Kya Priest 69 y o  male MRN: [id number]  Unit/Bed#: [facility] 622-01 Encounter: [id number]      Impression/Plan:  1   Left shoulder surgical site infection   Status post recent total shoulder arthroplasty on 2023   The patient is now status post incision and drainage of the shoulder wound with no deep collection found on 2023   Still must consider the possibility of an evolving joint infection although operatively not consistent with this   Fortunately the patient is not systemically ill and seems to tolerate the antibiotics without difficulty  Cultures are negative thus far  -Continue vancomycin and cefepime  -Follow-up for vancomycin trough management  -Follow-up wound cultures and adjust antibiotics needed  -Local wound care  -Close orthopedics follow-up  -Recheck CBC with differential and BMP     2   Acute kidney injury   Likely multifactorial including prerenal issues   Renal function continues to improve  -Volume management  -Dose adjusted antibiotics  -Recheck BMP  -Post nephrology follow-up     3   Diabetes mellitus   Type II with hyperglycemia and a recent hemoglobin A1c of 8 4   As a risk factor for recurrent infection  -Tighten diabetic control    4  Obesity  As a risk factor for recurrent infection    Discussed the above management plan with the primary service who agrees with the plan    Antibiotics:  Vancomycin 4  Cefepime 3  Antibiotics 4  Postop day 2    Subjective:  Patient has no fever, chills, sweats; no nausea, vomiting, diarrhea; no cough, shortness of breath; no increased pain  No new symptoms      Objective:  Vitals:  Temp:  [97 7 °F (36 5 °C)-98 3 °F (36 8 °C)] 97 7 °F (36 5 °C)  HR:  [81-98] 81  Resp:  [16-18] 16  BP: (116-122)/(64-71) 116/71  SpO2:  [96 %-99 %] 98 %  Temp (24hrs), Av 9 °F (36 6 °C), Min:97 7 °F (36 5 °C), Max:98 3 °F (36 8 °C)  Current: Temperature: 97 7 °F (36 5 °C)    Physical Exam:   General Appearance:  Alert, interactive, nontoxic, no acute distress  Throat: Oropharynx moist without lesions  Lungs:   Clear to auscultation bilaterally; no wheezes, rhonchi or rales; respirations unlabored   Heart:  RRR; no murmur, rub or gallop   Abdomen:   Soft, non-tender, non-distended, positive bowel sounds  Extremities: No clubbing, cyanosis or edema  Left shoulder incision without erythema or drainage   Skin: No new rashes or lesions  No draining wounds noted         Labs, Imaging, & Other studies:   All pertinent labs and imaging studies were personally reviewed  Results from last 7 days   Lab Units 05/03/23  0914 05/01/23  1143   WBC Thousand/uL 5 60 5 95   HEMOGLOBIN g/dL 12 3 13 4   PLATELETS Thousands/uL 102* 130*     Results from last 7 days   Lab Units 05/04/23  0718 05/03/23  0915 05/02/23  0552   SODIUM mmol/L 137 135 136   POTASSIUM mmol/L 4 0 4 5 4 3   CHLORIDE mmol/L 108 106 107   CO2 mmol/L 28 26 23   BUN mg/dL 35* 39* 44*   CREATININE mg/dL 1 28 1 59* 1 98*   EGFR ml/min/1 73sq m 56 43 33   CALCIUM mg/dL 9 0 9 5 9 5     Results from last 7 days   Lab Units 05/02/23  1410   GRAM STAIN RESULT  3+ Polys  No organisms seen         Results from last 7 days   Lab Units 05/01/23  1143   CRP mg/L 52 6*

## 2023-05-04 NOTE — PROGRESS NOTES
Allen Montaño is a 69 y o  male who is currently ordered Vancomycin IV with management by the Pharmacy Consult service  Relevant clinical data and objective / subjective history reviewed  Vancomycin Assessment:  Indication and Goal AUC/Trough: Soft tissue (goal -600, trough >10), -600, trough >10  Clinical Status: stable  Micro:      Renal Function:  SCr: 1 28 mg/dL (was 1 6 mg/dL)  CrCl: 67 9 mL/min (was 55 mL/min)  Renal replacement: Not on dialysis  Days of Therapy: 4  Current Dose: 1250 mg q 24 hr  Vancomycin Plan:  New Dosing:  keep same at 1250mg q24  Estimated AUC: 405 mcg hr/mL  Estimated Trough: 11 mcg/mL  Next Level: am draw 5-9-23  Renal Function Monitoring: Daily BMP and [place] will continue to follow closely for s/sx of nephrotoxicity, infusion reactions and appropriateness of therapy  BMP and CBC will be ordered per protocol  We will continue to follow the patients culture results and clinical progress daily  Trina Evans, R Ph , Pharmacist

## 2023-05-04 NOTE — TELEPHONE ENCOUNTER
Primary PT called to check in on patient since being hospitalized  S/W his wife who stated he is still admitted  We let her know that the last two appts scheduled (5/15 and 5/18) will be removed until he follows up with his surgeon

## 2023-05-04 NOTE — PLAN OF CARE
Problem: PAIN - ADULT  Goal: Verbalizes/displays adequate comfort level or baseline comfort level  Description: Interventions:  - Encourage patient to monitor pain and request assistance  - Assess pain using appropriate pain scale  - Administer analgesics based on type and severity of pain and evaluate response  - Implement non-pharmacological measures as appropriate and evaluate response  - Consider cultural and social influences on pain and pain management  - Notify physician/advanced practitioner if interventions unsuccessful or patient reports new pain  Outcome: Progressing     Problem: INFECTION - ADULT  Goal: Absence or prevention of progression during hospitalization  Description: INTERVENTIONS:  - Assess and monitor for signs and symptoms of infection  - Monitor lab/diagnostic results  - Monitor all insertion sites, i e  indwelling lines, tubes, and drains  - Monitor endotracheal if appropriate and nasal secretions for changes in amount and color  - [place] appropriate cooling/warming therapies per order  - Administer medications as ordered  - Instruct and encourage patient and family to use good hand hygiene technique  - Identify and instruct in appropriate isolation precautions for identified infection/condition  Outcome: Progressing  Goal: Absence of fever/infection during neutropenic period  Description: INTERVENTIONS:  - Monitor WBC    Outcome: Progressing

## 2023-05-04 NOTE — PROGRESS NOTES
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, status post I&D and VAC placement  · Currently on IV Vanco and cefepime     Plan Summary:    Creatinine back to baseline -- nephrology will sign off  Please call with any further questions or concerns   Can restart home losartan at d/c or if BP elevated sooner     SUBJECTIVE:  Feeling well today with no chest pain, shortness of breath, nausea, vomiting or diarrhea  Happy that his renal function and blood sugar have improved      OBJECTIVE:  Current Weight:    Vitals:    05/04/23 1057   BP: 122/78   Pulse: 91   Resp: 18   Temp: 97 9 °F (36 6 °C)   SpO2: 95%       Intake/Output Summary (Last 24 hours) at 5/4/2023 1153  Last data filed at 5/4/2023 0900  Gross per 24 hour   Intake 230 ml   Output 0 ml   Net 230 ml     General:  appears comfortable and in no acute distress   Skin:  No rash  Eyes:  Sclerae anicteric, no periorbital edema   ENT:  Moist mucous membranes  Neck:  Trachea midline, symmetric   Chest:  Clear to auscultation bilaterally with no wheezes, rales or rhonchi  CVS:  Regular rate and rhythm  Abdomen:  Soft, nontender, nondistended  Neuro:  Awake and alert  Psych:  Appropriate affect  Extremities: trace lower extremity edema, L arm in sling     Medications:  Scheduled Meds:  Current Facility-Administered Medications   Medication Dose Route Frequency Provider Last Rate    acetaminophen  650 mg Oral Q4H PRN Conchis Keene PA-C      cefepime  2,000 mg Intravenous [address] [place],  Andrew Richardson Stopped (05/04/23 2960)    diltiazem  180 mg Oral Daily Bryan Zamarripa      glimepiride  4 mg Oral Daily With Breakfast López RaphaelAMOR      glimepiride  4 mg Oral Daily With [employer] Bond, CRNP      hydrALAZINE  25 mg Oral Q8H PRN Conchis Keene PA-C      insulin glargine  40 Units Subcutaneous HS Conchis Keene PA-C      insulin lispro  1-5 Units Subcutaneous HS Yessi Cyndy AMOR Webb      insulin lispro  1-6 Units Subcutaneous TID AC AMOR Ponce      lactated ringers  20 mL/hr Intravenous Continuous Yassine Hart MD 20 mL/hr (05/02/23 1600)    naloxone  0 04 mg Intravenous Q1MIN PRN Conchis Keene PA-C      ondansetron  4 mg Intravenous Q4H PRN Conchis Keene PA-C      oxyCODONE  5 mg Oral Q4H PRN Conchis Keene PA-C      oxyCODONE  2 5 mg Oral Q4H PRN Conchis Keene PA-C      senna-docusate sodium  1 tablet Oral HS Conchis Keene PA-C      traZODone  50 mg Oral HS Conchis Keene PA-C      vancomycin  1,250 mg Intravenous Q24H Payton Diaz MD 1,250 mg (05/03/23 1338)       PRN Meds:   acetaminophen    hydrALAZINE    naloxone    ondansetron    oxyCODONE    oxyCODONE    Continuous Infusions:lactated ringers, 20 mL/hr, Last Rate: 20 mL/hr (05/02/23 1600)        Laboratory Results:  Results from last 7 days   Lab Units 05/04/23  0718 05/03/23  0915 05/03/23  0914 05/02/23  0552 05/01/23  1809 05/01/23  1143   WBC Thousand/uL 5 53  --  5 60  --   --  5 95   HEMOGLOBIN g/dL 12 1  --  12 3  --   --  13 4   HEMATOCRIT % 38 2  --  39 0  --   --  41 6   PLATELETS Thousands/uL 93*  --  102*  --   --  130*   SODIUM mmol/L 137 135  --  136 133* 134*   POTASSIUM mmol/L 4 0 4 5  --  4 3 4 2 4 6   CHLORIDE mmol/L 108 106  --  107 106 107   CO2 mmol/L 28 26  --  23 25 23 BUN mg/dL 35* 39*  --  44* 52* 51*   CREATININE mg/dL 1 28 1 59*  --  1 98* 2 36* 2 56*   CALCIUM mg/dL 9 0 9 5  --  9 5 9 6 9 8

## 2023-05-04 NOTE — PROGRESS NOTES
Progress Note - Orthopedics   Bonifacio Citizen 69 y o  male MRN: [id number]  [facility]      Subjective:    69 y o male POD 3 L shoulder superficial I+D  Intraoperatively, superficial area of purulence and fat necrosis encountered at surgical site, no evidence of deep infection  Cultures preliminary - no growth to date  ID on board for antibiotic selection, likely to discharge on PO abx today  ABILIO resolved, nephrology signed off  Continues to have labile glycemic control  No acute events, no new complaints  Patient stable  Pain well controlled  Denies fevers, chills, CP, SOB, N/V, numbness or tingling       Labs:  0   Lab Value Date/Time    HCT 38 2 05/04/2023 0718    HCT 39 0 05/03/2023 0914    HCT 41 6 05/01/2023 1143    HGB 12 1 05/04/2023 0718    HGB 12 3 05/03/2023 0914    HGB 13 4 05/01/2023 1143    INR 1 07 05/01/2023 1143    WBC 5 53 05/04/2023 0718    WBC 5 60 05/03/2023 0914    WBC 5 95 05/01/2023 1143     (H) 05/01/2023 1143    CRP 52 6 (H) 05/01/2023 1143       Meds:    Current Facility-Administered Medications:     acetaminophen (TYLENOL) tablet 650 mg, 650 mg, Oral, Q4H PRN, José Luis Munguia PA-C    cefepime (MAXIPIME) 2 g/50 mL dextrose IVPB, 2,000 mg, Intravenous, Q12H, L AMOR Marie, Last Rate: 100 mL/hr at 05/04/23 1737, 2,000 mg at 05/04/23 1737    diltiazem (CARDIZEM CD) 24 hr capsule 180 mg, 180 mg, Oral, Daily, José Luis Munguia PA-C    glimepiride (AMARYL) tablet 4 mg, 4 mg, Oral, Daily With Breakfast, AMOR Obando, 4 mg at 05/04/23 6523    glimepiride (AMARYL) tablet 4 mg, 4 mg, Oral, Daily With AMOR Menash, 4 mg at 05/04/23 8117    hydrALAZINE (APRESOLINE) tablet 25 mg, 25 mg, Oral, Q8H PRN, José Luis Munguia PA-C    insulin glargine (LANTUS) subcutaneous injection 40 Units 0 4 mL, 40 Units, Subcutaneous, HS, José Luis Munguia PA-C, 40 Units at 05/03/23 6418    insulin lispro (HumaLOG) 100 units/mL subcutaneous injection 1-5 Units, 1-5 Units, Subcutaneous, HS, ThomasAMOR Suresh, 1 Units at 05/03/23 2323    insulin lispro (HumaLOG) 100 units/mL subcutaneous injection 1-6 Units, 1-6 Units, Subcutaneous, TID AC, 1 Units at 05/04/23 1149 **AND** Fingerstick Glucose (POCT), , , TID AC, AMOR Simpson    lactated ringers infusion, 20 mL/hr, Intravenous, Continuous, Dudley To MD, Last Rate: 20 mL/hr at 05/02/23 1600, 20 mL/hr at 05/02/23 1600    naloxone (NARCAN) 0 04 mg/mL syringe 0 04 mg, 0 04 mg, Intravenous, Q1MIN PRN, Maddy Frieze, PA-C    ondansetron [facility]) injection 4 mg, 4 mg, Intravenous, Q4H PRN, Maddy Frieze, PA-C    oxyCODONE (ROXICODONE) IR tablet 5 mg, 5 mg, Oral, Q4H PRN, Maddy Frieze, PA-C    oxyCODONE (ROXICODONE) split tablet 2 5 mg, 2 5 mg, Oral, Q4H PRN, Maddy Frieze, PA-C    senna-docusate sodium (SENOKOT S) 8 6-50 mg per tablet 1 tablet, 1 tablet, Oral, HS, Maddy Frieze, PA-C, 1 tablet at 05/03/23 2323    traZODone (DESYREL) tablet 50 mg, 50 mg, Oral, HS, Maddy Frieze, PA-C, 50 mg at 05/03/23 2323    vancomycin (VANCOCIN) 1,250 mg in sodium chloride 0 9 % 250 mL IVPB, 1,250 mg, Intravenous, Q24H, Nikhil Christopher MD, Stopped at 05/04/23 1509    Blood Culture:   Lab Results   Component Value Date    BLOODCX No Growth After 5 Days  09/13/2022    BLOODCX No Growth After 5 Days  09/13/2022       Wound Culture:   Lab Results   Component Value Date    WOUNDCULT 1+ Growth of Klebsiella oxytoca (A) 09/14/2022    WOUNDCULT 1+ Growth of 09/14/2022       Ins and Outs:  I/O last 24 hours: In: 1519 [P O :1020; I V :30; IV Piggyback:300]  Out: 0           Physical:  Vitals:    05/04/23 1501   BP: 120/72   Pulse: 86   Resp: 16   Temp: 97 5 °F (36 4 °C)   SpO2: 95%     Musculoskeletal: left Upper Extremity  · Skin intact surrounding dressing  No erythema, ecchymosis    · Mepilex in place - C/D/I without strike thru  · Well approximated incision without drainage  No fluctuance  Unable to express any fluid  · NTTP  · Sensation intact to median/radial/ulnar nerve distribution   · Motor intact anterior interosseous nerve/posterior interosseous nerve/median/radial/ulnar nerve distributions  · 2+ radial pulse, symmetric bilaterally  · Digits warm and well perfused  · Capillary refill < 2 seconds    Assessment:    67 y o male with left rTSA superficial SSI now POD 3 from L shoulder I+D  Patient doing well  Plan:  · NWB LUE in sling  · Follow up intra-operative cx - preliminarily no growth to date  · Will hold off on PT/OT in setting of acute infection  · ID consult for abx selection - continue vanc and cefepime, likely to dc on PO abx today per ID, will await their final recommendations  · Pharmacy consult for vanco dosing  · Nephrology consult for ABILIO - now signed off given resolution of ABILIO  Will require follow up outpatient with nephrology  Continue to monitor BMP and encourage PO fluid intake    · Will monitor for ABLA and administer IVF/prbc as indicated for Greater than 2 gram drop or Hgb < 7  · Pain control  · DVT ppx SCDs  · Appreciate medical comanagement  · Dispo: Ortho will follow    Pan MD Nilo

## 2023-05-04 NOTE — PROGRESS NOTES
Internal Medicine Progress Note  Patient: Allen Montaño  Age/sex: 69 y o  male  Medical Record #: [id number]      ASSESSMENT/PLAN: (Interval History)  Allen Montaño is seen and examined and management for following issues:    Status Post left Total SHOULDER ARTHROPLASTY now w/possible superficial infection   Pain controlled   Continue encourage incentive spirometry; monitor fever curve   Surgical washout 5/3/23   Cont abx per orthopedics/pharmacy w/Vanco renal dosed   Await interoperative cultures   DVT prophylaxis in place and reviewed  Results from last 7 days   Lab Units 05/04/23  0718   WBC Thousand/uL 5 53   HEMOGLOBIN g/dL 12 1   HEMATOCRIT % 38 2   PLATELETS Thousands/uL 93*       HTN   Hold losartan/triamterene/hctz in the post operative setting to avoid hypotension/ABILIO   OK to resume on dc   Add hydralazine as needed for SBP >160   Monitor trend   Continue cardizem with appropriate hold parameters   stable     DM II   Hgb A1c 8 2   Home meds: lantus 40 units SQ qhs/glimepiride daily   Here: lantus 40u qhs/sliding scale/glimepiride 4mg daily   Cont DM diet   BS improving     ABILIO   Baseline creatinine 1 0-1 2   Peaked at 2 5 on admission   Currently 1 28   Appreciate nephrology input   Off IV fluids   Urine studies ordered by renal   Avoid nephrotoxic medications   vanco adjusted by pharmacy   Hold losartan/traimterene/hctz     Thrombocytopenia   Baseline platelets 00-71 range   Currently 93   Monitor cbc     ETOH cirrhosis   No longer uses ETOH   F/b GI           PRE-OP HGB LEVEL: 13 4  The above assessment and plan was reviewed and updated as determined by my evaluation of the patient on 5/4/2023      Labs:   Results from last 7 days   Lab Units 05/04/23  0718 05/03/23  0914   WBC Thousand/uL 5 53 5 60   HEMOGLOBIN g/dL 12 1 12 3   HEMATOCRIT % 38 2 39 0   PLATELETS Thousands/uL 93* 102*     Results from last 7 days   Lab Units 05/04/23  0718 05/03/23  0915   SODIUM mmol/L 137 135 POTASSIUM mmol/L 4 0 4 5   CHLORIDE mmol/L 108 106   CO2 mmol/L 28 26   BUN mg/dL 35* 39*   CREATININE mg/dL 1 28 1 59*   CALCIUM mg/dL 9 0 9 5         Results from last 7 days   Lab Units 05/01/23  1143   INR  1 07     Results from last 7 days   Lab Units 05/04/23  0743 05/03/23  2056 05/03/23  1724   POC GLUCOSE mg/dl 124 183* 234*       Review of Scheduled Meds:  Current Facility-Administered Medications   Medication Dose Route Frequency Provider Last Rate    acetaminophen  650 mg Oral Q4H PRN Rema Juan PA-C      cefepime  2,000 mg Intravenous [address] [place] [address] Andrew Richardson Stopped (05/04/23 4296)    diltiazem  180 mg Oral Daily Rema Juan PA-C      glimepiride  4 mg Oral Daily With Breakfast VirginiaAMOR Araya      glimepiride  4 mg Oral Daily With [email or web address], CRNP      hydrALAZINE  25 mg Oral Q8H PRN Rema Juan PA-C      insulin glargine  40 Units Subcutaneous HS Rema Juan PA-C      insulin lispro  1-5 Units Subcutaneous HS AMOR Krueger      insulin lispro  1-6 Units Subcutaneous TID AC AMOR Tapia      lactated ringers  20 mL/hr Intravenous Continuous Curt Chavez MD 20 mL/hr (05/02/23 1600)    naloxone  0 04 mg Intravenous Q1MIN PRN Rema Juan PA-C      ondansetron  4 mg Intravenous Q4H PRN Rema Juan PA-C      oxyCODONE  5 mg Oral Q4H PRN Rema Juan PA-C      oxyCODONE  2 5 mg Oral Q4H PRN Rema Juan PA-C      senna-docusate sodium  1 tablet Oral HS Rema Juan PA-C      traZODone  50 mg Oral HS Rema Juan PA-C      vancomycin  1,250 mg Intravenous Q24H Anita Chao MD 1,250 mg (05/03/23 1338)       Subjective/ HPI: Minoo Baker seen and examined  Patient's overnight issues or events were reviewed with nursing or staff during rounds or morning huddle session  New or overnight issues include the following:     Pt seen in his room   He states that he is doing well  He denies any current complaints  ROS:   A 10 point ROS was performed; negative except as noted above  Imaging:     US kidney and bladder   Final Result by Laura Castillo DO (05/01 1715)      No hydronephrosis  Enlarged prostate  Splenomegaly  Workstation performed: [id number]         XR chest portable   Final Result by Suzy Lindo MD (05/01 1803)      No acute cardiopulmonary disease  Workstation performed: [id number]             *Labs /Radiology studies Reviewed  *Medications  reviewed and reconciled as needed  *Please refer to order section for additional ordered labs studies  *Case discussed with primary attending during morning huddle case rounds    Physical Examination:  Vitals:   Vitals:    05/03/23 1913 05/03/23 2219 05/04/23 0249 05/04/23 0742   BP: 122/68 118/68 120/64 116/71   Pulse: 94 83 81 81   Resp: 18 18  16   Temp: 98 3 °F (36 8 °C) 97 8 °F (36 6 °C)  97 7 °F (36 5 °C)   TempSrc:       SpO2: 97% 96% 96% 98%       GEN: No apparent distress, interactive, conversive  NEURO: Alert and oriented x3  HEENT: Pupils are equal and reactive, EOMI, mucous membranes are moist, face symmetrical  CV: S1 S2 regular, no MRG, no peripheral edema noted  RESP: Lungs are clear bilaterally, no wheezes, rales or rhonchi noted, on room air, respirations easy and non labored  GI: obese, soft non tender, non distended; +BS x4  : Voiding without difficulty  MUSC: Moves all extremities; except LUE in sling  SKIN: pink, warm and dry, normal turgor, incision with upper portion serous drainage; PVD discoloration b/l LE         The above physical exam was reviewed and updated as determined by my evaluation of the patient on 5/4/2023      Invasive Devices     Peripheral Intravenous Line  Duration           Peripheral IV 05/01/23 Right;Ventral (anterior) Forearm 2 days                   VTE Pharmacologic Prophylaxis: Reason for no pharmacologic prophylaxis ambulatory  Code Status: Level 1 - Full Code  Current Length of Stay: 3 day(s)      Total floor / unit time spent today 30 minutes  Coordination of patient's care was performed in conjunction with primary service  Time invested included review of patient's labs, vitals, and management of their comorbidities with continued monitoring, examination of patient as well as answering patient questions, documenting her findings and creating progress note in electronic medical record,  ordering appropriate diagnostic testing  Medical decision making for the day was made by supervising physician unless otherwise noted in their attestation statement  ** Please Note:  voice to text software may have been used in the creation of this document   Although proof errors in transcription or interpretation are a potential of such software**

## 2023-05-04 NOTE — TELEPHONE ENCOUNTER
----- Message from John Leslie PA-C sent at 5/4/2023  2:25 PM EDT -----  Patient being d/c from [facility] soon   Please call after discharge for ABILIO hospital follow up in 3-4 weeks

## 2023-05-05 ENCOUNTER — TELEPHONE (OUTPATIENT)
Dept: OBGYN CLINIC | Facility: HOSPITAL | Age: 68
End: 2023-05-05

## 2023-05-05 VITALS
OXYGEN SATURATION: 98 % | SYSTOLIC BLOOD PRESSURE: 120 MMHG | DIASTOLIC BLOOD PRESSURE: 72 MMHG | TEMPERATURE: 97.8 F | RESPIRATION RATE: 18 BRPM | HEART RATE: 79 BPM

## 2023-05-05 LAB
ANION GAP SERPL CALCULATED.3IONS-SCNC: 3 MMOL/L (ref 4–13)
BASOPHILS # BLD AUTO: 0.03 THOUSANDS/ÂΜL (ref 0–0.1)
BASOPHILS NFR BLD AUTO: 1 % (ref 0–1)
BUN SERPL-MCNC: 28 MG/DL (ref 5–25)
CALCIUM SERPL-MCNC: 9.2 MG/DL (ref 8.3–10.1)
CHLORIDE SERPL-SCNC: 109 MMOL/L (ref 96–108)
CO2 SERPL-SCNC: 26 MMOL/L (ref 21–32)
CREAT SERPL-MCNC: 1.12 MG/DL (ref 0.6–1.3)
EOSINOPHIL # BLD AUTO: 0.09 THOUSAND/ÂΜL (ref 0–0.61)
EOSINOPHIL NFR BLD AUTO: 2 % (ref 0–6)
ERYTHROCYTE [DISTWIDTH] IN BLOOD BY AUTOMATED COUNT: 13.2 % (ref 11.6–15.1)
GFR SERPL CREATININE-BSD FRML MDRD: 66 ML/MIN/1.73SQ M
GLUCOSE SERPL-MCNC: 103 MG/DL (ref 65–140)
GLUCOSE SERPL-MCNC: 130 MG/DL (ref 65–140)
GLUCOSE SERPL-MCNC: 72 MG/DL (ref 65–140)
HCT VFR BLD AUTO: 39 % (ref 36.5–49.3)
HGB BLD-MCNC: 12.1 G/DL (ref 12–17)
IMM GRANULOCYTES # BLD AUTO: 0.01 THOUSAND/UL (ref 0–0.2)
IMM GRANULOCYTES NFR BLD AUTO: 0 % (ref 0–2)
LYMPHOCYTES # BLD AUTO: 0.82 THOUSANDS/ÂΜL (ref 0.6–4.47)
LYMPHOCYTES NFR BLD AUTO: 20 % (ref 14–44)
MCH RBC QN AUTO: 27.4 PG (ref 26.8–34.3)
MCHC RBC AUTO-ENTMCNC: 31 G/DL (ref 31.4–37.4)
MCV RBC AUTO: 88 FL (ref 82–98)
MONOCYTES # BLD AUTO: 0.42 THOUSAND/ÂΜL (ref 0.17–1.22)
MONOCYTES NFR BLD AUTO: 10 % (ref 4–12)
NEUTROPHILS # BLD AUTO: 2.77 THOUSANDS/ÂΜL (ref 1.85–7.62)
NEUTS SEG NFR BLD AUTO: 67 % (ref 43–75)
NRBC BLD AUTO-RTO: 0 /100 WBCS
PLATELET # BLD AUTO: 90 THOUSANDS/UL (ref 149–390)
PMV BLD AUTO: 12 FL (ref 8.9–12.7)
POTASSIUM SERPL-SCNC: 3.7 MMOL/L (ref 3.5–5.3)
RBC # BLD AUTO: 4.42 MILLION/UL (ref 3.88–5.62)
SODIUM SERPL-SCNC: 138 MMOL/L (ref 135–147)
WBC # BLD AUTO: 4.14 THOUSAND/UL (ref 4.31–10.16)

## 2023-05-05 RX ORDER — DOXYCYCLINE 100 MG/1
100 TABLET ORAL 2 TIMES DAILY
Qty: 20 TABLET | Refills: 0 | Status: SHIPPED | OUTPATIENT
Start: 2023-05-05 | End: 2023-05-15 | Stop reason: SDUPTHER

## 2023-05-05 RX ORDER — CEFADROXIL 500 MG/1
500 CAPSULE ORAL EVERY 12 HOURS SCHEDULED
Qty: 20 CAPSULE | Refills: 0 | Status: SHIPPED | OUTPATIENT
Start: 2023-05-05 | End: 2023-05-15 | Stop reason: SDUPTHER

## 2023-05-05 RX ADMIN — DILTIAZEM HYDROCHLORIDE 180 MG: 180 CAPSULE, COATED, EXTENDED RELEASE ORAL at 08:50

## 2023-05-05 RX ADMIN — CEFEPIME 2000 MG: 2 INJECTION, POWDER, FOR SOLUTION INTRAVENOUS at 05:04

## 2023-05-05 RX ADMIN — GLIMEPIRIDE 4 MG: 2 TABLET ORAL at 08:51

## 2023-05-05 NOTE — DISCHARGE SUMMARY
ORTHOPEDICS DISCHARGE SUMMARY  Wolf Cancer 69 y o  male MRN: [id number]  Unit/Bed#: [facility] 622-01    Attending Physician: Darío Mata    Admitting diagnosis: Encounter for wound re-check [Z51 89]  Aftercare following left shoulder joint replacement surgery [Z47 1, Z96 612]    Discharge diagnosis: Encounter for wound re-check [Z51 89]  Aftercare following left shoulder joint replacement surgery [Z47 1, Z96 612]    Date of admission: 5/1/2023    Date of discharge: 05/05/23         Procedure: Left shoulder irrigation and debridement    HPI:  This is a 69y o  year old male that presented to the office with signs and symptoms of left shoulder rTSA cellulitis of incision  They tried and failed conservative treatment measures and wished to proceed with surgical intervention  The risks, benefits, and complications of the procedure were discussed with the patient and informed consent was obtained  Hospital Course: The patient was admitted to the hospital on 5/1/2023 and underwent an uncomplicated left shoulder irrigation and debridement on 5/2/2023  They were transferred to the floor after a brief stay in the post-anesthesia care unit  Their pain was well managed with IV and oral pain medications  Patient continued on IV antibiotics per recommendations by infectious disease   On discharge date pt was cleared by PT and the medicine team and determined to be safe for discharge    0   Lab Value Date/Time    HGB 12 1 05/05/2023 0902    HGB 12 1 05/04/2023 0718    HGB 12 3 05/03/2023 0914    HGB 13 4 05/01/2023 1143    HGB 11 2 (L) 04/05/2023 0617    HGB 13 1 04/04/2023 0853    HGB 12 4 03/20/2023 0809    HGB 15 1 09/13/2022 2034    HGB 12 7 05/19/2022 0429    HGB 12 4 05/18/2022 0434    HGB 12 7 05/17/2022 1527    HGB 13 9 03/27/2022 2120    HGB 8 6 (L) 01/03/2021 0512    HGB 8 4 (L) 01/02/2021 0510    HGB 9 0 (L) 01/01/2021 0555    HGB 9 0 (L) 12/30/2020 0554    HGB 8 2 (L) 12/29/2020 0228    HGB 7 8 (L) 12/27/2020 0532    HGB 7 8 (L) 12/26/2020 0503    HGB 7 9 (L) 12/25/2020 0227     Greater than 2 gram drop which qualifies for diagnosis of acute blood loss anemia  Vital signs remained stable and pt was resuscitated with IVF as needed     BMI 35 24 morbidly obese  Recommend behavior modifications, nutrition and physical activity  Discharge Instructions: The patient was discharged nonweight bearing to the left upper extremity  Take pain medications as instructed  Take antibiotics as instructed  Discharge Medications: For the complete list of discharge medications, please refer to the patient's medication reconciliation

## 2023-05-05 NOTE — PLAN OF CARE
Problem: PAIN - ADULT  Goal: Verbalizes/displays adequate comfort level or baseline comfort level  Description: Interventions:  - Encourage patient to monitor pain and request assistance  - Assess pain using appropriate pain scale  - Administer analgesics based on type and severity of pain and evaluate response  - Implement non-pharmacological measures as appropriate and evaluate response  - Consider cultural and social influences on pain and pain management  - Notify physician/advanced practitioner if interventions unsuccessful or patient reports new pain  5/5/2023 1047 by Heather Pace RN  Outcome: Progressing  5/5/2023 1046 by Heather Pace RN  Outcome: Progressing     Problem: INFECTION - ADULT  Goal: Absence or prevention of progression during hospitalization  Description: INTERVENTIONS:  - Assess and monitor for signs and symptoms of infection  - Monitor lab/diagnostic results  - Monitor all insertion sites, i e  indwelling lines, tubes, and drains  - Monitor endotracheal if appropriate and nasal secretions for changes in amount and color  - [place] appropriate cooling/warming therapies per order  - Administer medications as ordered  - Instruct and encourage patient and family to use good hand hygiene technique  - Identify and instruct in appropriate isolation precautions for identified infection/condition  5/5/2023 1047 by Heather Pace RN  Outcome: Progressing  5/5/2023 1046 by Heather Pace RN  Outcome: Progressing  Goal: Absence of fever/infection during neutropenic period  Description: INTERVENTIONS:  - Monitor WBC    5/5/2023 1047 by Heather Pace RN  Outcome: Progressing  5/5/2023 1046 by Heather Pace RN  Outcome: Progressing     Problem: SAFETY ADULT  Goal: Patient will remain free of falls  Description: INTERVENTIONS:  - Educate patient/family on patient safety including physical limitations  - Instruct patient to call for assistance with activity   - Consult OT/PT to assist with strengthening/mobility   - Keep Call bell within reach  - Keep bed low and locked with side rails adjusted as appropriate  - Keep care items and personal belongings within reach  - Initiate and maintain comfort rounds  - Make Fall Risk Sign visible to staff  - Offer Toileting every  Hours, in advance of need  - Initiate/Maintain alarm  - Obtain necessary fall risk management equipment:   - Apply yellow socks and bracelet for high fall risk patients  - Consider moving patient to room near nurses station  5/5/2023 1047 by Wang Toledo RN  Outcome: Progressing  5/5/2023 1046 by Wang Toledo RN  Outcome: Progressing  Goal: Maintain or return to baseline ADL function  Description: INTERVENTIONS:  -  Assess patient's ability to carry out ADLs; assess patient's baseline for ADL function and identify physical deficits which impact ability to perform ADLs (bathing, care of mouth/teeth, toileting, grooming, dressing, etc )  - Assess/evaluate cause of self-care deficits   - Assess range of motion  - Assess patient's mobility; develop plan if impaired  - Assess patient's need for assistive devices and provide as appropriate  - Encourage maximum independence but intervene and supervise when necessary  - Involve family in performance of ADLs  - Assess for home care needs following discharge   - Consider OT consult to assist with ADL evaluation and planning for discharge  - Provide patient education as appropriate  5/5/2023 1047 by Wagn Toledo RN  Outcome: Progressing  5/5/2023 1046 by Wang Toledo RN  Outcome: Progressing  Goal: Maintains/Returns to pre admission functional level  Description: INTERVENTIONS:  - Perform BMAT or MOVE assessment daily    - Set and communicate daily mobility goal to care team and patient/family/caregiver  - Collaborate with rehabilitation services on mobility goals if consulted  - Perform Range of Motion  times a day  - Reposition patient every  hours    - Dangle patient  times a day  - Stand patient  times a day  - Ambulate patient  times a day  - Out of bed to chair  times a day   - Out of bed for meal times a day  - Out of bed for toileting  - Record patient progress and toleration of activity level   5/5/2023 1047 by Dov Funk RN  Outcome: Progressing  5/5/2023 1046 by Dov Funk RN  Outcome: Progressing     Problem: DISCHARGE PLANNING  Goal: Discharge to home or other facility with appropriate resources  Description: INTERVENTIONS:  - Identify barriers to discharge w/patient and caregiver  - Arrange for needed discharge resources and transportation as appropriate  - Identify discharge learning needs (meds, wound care, etc )  - Arrange for interpretive services to assist at discharge as needed  - Refer to Case Management Department for coordinating discharge planning if the patient needs post-hospital services based on physician/advanced practitioner order or complex needs related to functional status, cognitive ability, or social support system  5/5/2023 1047 by Dov Funk RN  Outcome: Progressing  5/5/2023 1046 by Dov Funk RN  Outcome: Progressing     Problem: Knowledge Deficit  Goal: Patient/family/caregiver demonstrates understanding of disease process, treatment plan, medications, and discharge instructions  Description: Complete learning assessment and assess knowledge base    Interventions:  - Provide teaching at level of understanding  - Provide teaching via preferred learning methods  5/5/2023 1047 by Dov Funk RN  Outcome: Progressing  5/5/2023 1046 by Dov Funk RN  Outcome: Progressing

## 2023-05-05 NOTE — PROGRESS NOTES
Progress Note - Infectious Disease   Flavio Labor 69 y o  male MRN: [id number]  Unit/Bed#: [facility] 622-01 Encounter: [id number]      Impression/Plan:  1   Left shoulder surgical site infection   Status post recent total shoulder arthroplasty on 4/4/2023   The patient is now status post incision and drainage of the shoulder wound with no deep collection found on 5/2/2023   Still must consider the possibility of an evolving joint infection although operatively not consistent with this   Fortunately the patient is not systemically ill and seems to tolerate the antibiotics without difficulty  Cultures with coagulase-negative staph in broth only  -Continue vancomycin   -Discontinue cefepime  -When ready for discharge today, can discharge on Duricef 500 mg p o  every 12 hours and doxycycline 100 mg p o  every 12 hours through 5/9/2023 which would be 7 days from the time of the drainage  -Asked microbiology lab to hold the culture for 2 weeks to look for Cutibacterium acnes for future reference as needed  -Local wound care  -Close orthopedics follow-up     2   Acute kidney injury   Likely multifactorial including prerenal issues   Renal function continues to improve  -Volume management  -Nephrology follow-up     3   Diabetes mellitus   Type II with hyperglycemia and a recent hemoglobin A1c of 8 4   As a risk factor for recurrent infection  -Tighten diabetic control     4  Obesity  As a risk factor for recurrent infection    Discussed the above management plan with the primary service    Explained to patient in detail that there is still a small chance that this infection is intra-articular and he may relapse  The patient understands and will seek medical care if any new or worsening symptoms  Antibiotics:  Vancomycin 5  Cefepime 4  Antibiotics 5  Postop day 3    Subjective:  Patient has no fever, chills, sweats; no nausea, vomiting, diarrhea; no cough, shortness of breath; no pain   No new symptoms  Objective:  Vitals:  Temp:  [97 5 °F (36 4 °C)-98 °F (36 7 °C)] 97 8 °F (36 6 °C)  HR:  [78-91] 79  Resp:  [16-18] 18  BP: (109-124)/(63-78) 120/72  SpO2:  [95 %-99 %] 98 %  Temp (24hrs), Av 8 °F (36 6 °C), Min:97 5 °F (36 4 °C), Max:98 °F (36 7 °C)  Current: Temperature: 97 8 °F (36 6 °C)    Physical Exam:   General Appearance:  Alert, interactive, nontoxic, no acute distress  Throat: Oropharynx moist without lesions  Lungs:   Clear to auscultation bilaterally; no wheezes, rhonchi or rales; respirations unlabored   Heart:  RRR; no murmur, rub or gallop   Abdomen:   Soft, non-tender, non-distended, positive bowel sounds  Extremities: No clubbing, cyanosis or edema  Left shoulder incision dressed with a dry dressing in place  No spreading erythema  Skin: No new rashes or lesions  No draining wounds noted         Labs, Imaging, & Other studies:   All pertinent labs and imaging studies were personally reviewed  Results from last 7 days   Lab Units 23  0902 23  0718 23  0914   WBC Thousand/uL 4 14* 5 53 5 60   HEMOGLOBIN g/dL 12 1 12 1 12 3   PLATELETS Thousands/uL 90* 93* 102*     Results from last 7 days   Lab Units 23  0902 23  0718 23  0915   SODIUM mmol/L 138 137 135   POTASSIUM mmol/L 3 7 4 0 4 5   CHLORIDE mmol/L 109* 108 106   CO2 mmol/L 26 28 26   BUN mg/dL 28* 35* 39*   CREATININE mg/dL 1 12 1 28 1 59*   EGFR ml/min/1 73sq m 66 56 43   CALCIUM mg/dL 9 2 9 0 9 5     Results from last 7 days   Lab Units 23  1410   GRAM STAIN RESULT  3+ Polys  No organisms seen         Results from last 7 days   Lab Units 23  1143   CRP mg/L 52 6*

## 2023-05-05 NOTE — TELEPHONE ENCOUNTER
Pictures arrived in [employer]  Do not see opening other than a small area where SS fluid is leaking    They took of the surgical dressing as the fluid was leaking out the bottom  Advised they should apply a bulky gauze dressing and secure with ace wrap and tape QD and PRN  Watch for s/s of infection, increased pain, swelling, redness, heat to touch and fever  Call office should this occur  Patient does not have PO appt scheduled yet, would you like to see him next week for evaluation?

## 2023-05-05 NOTE — TELEPHONE ENCOUNTER
Spoke to patient and he states Vac was removed 2 days ago  He was discharged from hospital today and was washing up in the bathroom and noticed pink drainage coming out of the bottom of the dressing  Did have some bloody drainage to the end of the drainage  It has stopped now  They are sending pictures thru mychart  Advised they should reinforce dressing with gauze dressing secure with ace wrap and medical tape  Verbalized understanding  Please advise

## 2023-05-05 NOTE — TELEPHONE ENCOUNTER
Caller: Isael    Doctor: Santy Alex    Reason for call: Patient just released from hospital Post op and his incision is opened up and draining   Warm transferred to [facility] for advise     Call back#: NA

## 2023-05-05 NOTE — TELEPHONE ENCOUNTER
Caller: Isael    Doctor: Dhruv Mayo    Reason for call: patient calling to make sure we received his images, transferred call to [facility]    Call back#: [phone]

## 2023-05-05 NOTE — PROGRESS NOTES
Internal Medicine Progress Note  Patient: Cynthia Gonzalez  Age/sex: 69 y o  male  Medical Record #: [id number]      ASSESSMENT/PLAN: (Interval History)  Cynthia Gonzalez is seen and examined and management for following issues:    Status Post left Total SHOULDER ARTHROPLASTY now w/possible superficial infection   Pain controlled   Continue encourage incentive spirometry; monitor fever curve   Surgical washout 5/3/23   Cont abx per orthopedics/pharmacy w/Vanco renal dosed   Await interoperative cultures  Coagulase negative staph in broth only  DC on Duricef and doxy for 7 days per ID   DVT prophylaxis in place and reviewed  Results from last 7 days   Lab Units 05/05/23  0902   WBC Thousand/uL 4 14*   HEMOGLOBIN g/dL 12 1   HEMATOCRIT % 39 0   PLATELETS Thousands/uL 90*       HTN   Hold losartan/triamterene/hctz in the post operative setting to avoid hypotension/ABILIO   OK to resume on dc   Add hydralazine as needed for SBP >160   Monitor trend   Continue cardizem with appropriate hold parameters   stable     DM II   Hgb A1c 8 2   Home meds: lantus 40 units SQ qhs/glimepiride daily   Here: lantus 40u qhs/sliding scale/glimepiride 4mg daily   Cont DM diet   BS improving     ABILIO   Baseline creatinine 1 0-1 2   Peaked at 2 5 on admission   Currently 1 12   Appreciate nephrology input   Off IV fluids   Urine studies ordered by renal   Avoid nephrotoxic medications   vanco adjusted by pharmacy   Hold losartan/traimterene/hctz     Thrombocytopenia   Baseline platelets 33-88 range   Currently 90   Monitor cbc     ETOH cirrhosis   No longer uses ETOH   F/b GI           PRE-OP HGB LEVEL: 13 4  The above assessment and plan was reviewed and updated as determined by my evaluation of the patient on 5/5/2023      Labs:   Results from last 7 days   Lab Units 05/05/23  0902 05/04/23  0718   WBC Thousand/uL 4 14* 5 53   HEMOGLOBIN g/dL 12 1 12 1   HEMATOCRIT % 39 0 38 2   PLATELETS Thousands/uL 90* 93* Results from last 7 days   Lab Units 05/05/23  0902 05/04/23  0718   SODIUM mmol/L 138 137   POTASSIUM mmol/L 3 7 4 0   CHLORIDE mmol/L 109* 108   CO2 mmol/L 26 28   BUN mg/dL 28* 35*   CREATININE mg/dL 1 12 1 28   CALCIUM mg/dL 9 2 9 0         Results from last 7 days   Lab Units 05/01/23  1143   INR  1 07     Results from last 7 days   Lab Units 05/05/23  0716 05/04/23  2125 05/04/23  1618   POC GLUCOSE mg/dl 72 135 115       Review of Scheduled Meds:  Current Facility-Administered Medications   Medication Dose Route Frequency Provider Last Rate    acetaminophen  650 mg Oral Q4H PRN Derryl Kitten, PA-C      diltiazem  180 mg Oral Daily Derryl Kitten, PA-C      glimepiride  4 mg Oral Daily With Breakfast Jazlyn GarciaAMOR      glimepiride  4 mg Oral Daily With [employer] Hobzy Bond, CRNP      hydrALAZINE  25 mg Oral Q8H PRN Derryl Kitten, PA-C      insulin glargine  40 Units Subcutaneous HS Derryl Kitten, PA-C      insulin lispro  1-5 Units Subcutaneous HS [place] Ratel Harshira, LEELEENP      insulin lispro  1-6 Units Subcutaneous TID AC AMOR Waterman      lactated ringers  20 mL/hr Intravenous Continuous Roxane Hand MD 20 mL/hr (05/02/23 1600)    naloxone  0 04 mg Intravenous Q1MIN PRN Derryl Kitten, PA-C      ondansetron  4 mg Intravenous Q4H PRN Derryl Kitten, PA-C      oxyCODONE  5 mg Oral Q4H PRN Derryl Kitten, PA-C      oxyCODONE  2 5 mg Oral Q4H PRN Derryl Kitten, PA-C      senna-docusate sodium  1 tablet Oral HS Derryl Kitten, PA-C      traZODone  50 mg Oral HS Derryl Kitten, PA-C      vancomycin  1,250 mg Intravenous Q24H Ted Villafuerte MD Stopped (05/04/23 8135)       Subjective/ HPI: Flemichelle Gone seen and examined  Patient's overnight issues or events were reviewed with nursing or staff during rounds or morning huddle session  New or overnight issues include the following:     Pt seen in his room   He states that he is doing well and looking forward to DC       ROS:   A 10 point ROS was performed; negative except as noted above  Imaging:     RADIOLOGY RESULTS   Final Result by  (05/04 1226)      US kidney and bladder   Final Result by Amy Freeman DO (05/01 1715)      No hydronephrosis  Enlarged prostate  Splenomegaly  Workstation performed: [id number]         XR chest portable   Final Result by Rios Koehler MD (05/01 1963)      No acute cardiopulmonary disease  Workstation performed: [id number]             *Labs /Radiology studies Reviewed  *Medications  reviewed and reconciled as needed  *Please refer to order section for additional ordered labs studies  *Case discussed with primary attending during morning huddle case rounds    Physical Examination:  Vitals:   Vitals:    05/04/23 2126 05/04/23 2127 05/05/23 0346 05/05/23 0719   BP: 109/63 109/63 124/76 120/72   Pulse: 86 85 78 79   Resp:   16 18   Temp: 98 °F (36 7 °C) 98 °F (36 7 °C)  97 8 °F (36 6 °C)   TempSrc:       SpO2: 97% 96% 99% 98%       GEN: No apparent distress, interactive, conversive  NEURO: Alert and oriented x3  HEENT: Pupils are equal and reactive, EOMI, mucous membranes are moist, face symmetrical  CV: S1 S2 regular, no MRG, no peripheral edema noted  RESP: Lungs are clear bilaterally, no wheezes, rales or rhonchi noted, on room air, respirations easy and non labored  GI: obese, soft non tender, non distended; +BS x4  : Voiding without difficulty  MUSC: Moves all extremities; Lt UE in sling  SKIN: pink, warm and dry, normal turgor, incision without drainage; PVD discoloration b/l LE         The above physical exam was reviewed and updated as determined by my evaluation of the patient on 5/5/2023      Invasive Devices     Peripheral Intravenous Line  Duration           Peripheral IV 05/01/23 Right;Ventral (anterior) Forearm 3 days                   VTE Pharmacologic Prophylaxis: Reason for no pharmacologic prophylaxis ambulatory  Code Status: Level 1 - Full Code  Current Length of Stay: 4 day(s)      Total floor / unit time spent today 30 minutes  Coordination of patient's care was performed in conjunction with primary service  Time invested included review of patient's labs, vitals, and management of their comorbidities with continued monitoring, examination of patient as well as answering patient questions, documenting her findings and creating progress note in electronic medical record,  ordering appropriate diagnostic testing  Medical decision making for the day was made by supervising physician unless otherwise noted in their attestation statement  ** Please Note:  voice to text software may have been used in the creation of this document   Although proof errors in transcription or interpretation are a potential of such software**

## 2023-05-05 NOTE — PLAN OF CARE
Problem: PAIN - ADULT  Goal: Verbalizes/displays adequate comfort level or baseline comfort level  Description: Interventions:  - Encourage patient to monitor pain and request assistance  - Assess pain using appropriate pain scale  - Administer analgesics based on type and severity of pain and evaluate response  - Implement non-pharmacological measures as appropriate and evaluate response  - Consider cultural and social influences on pain and pain management  - Notify physician/advanced practitioner if interventions unsuccessful or patient reports new pain  Outcome: Progressing     Problem: INFECTION - ADULT  Goal: Absence or prevention of progression during hospitalization  Description: INTERVENTIONS:  - Assess and monitor for signs and symptoms of infection  - Monitor lab/diagnostic results  - Monitor all insertion sites, i e  indwelling lines, tubes, and drains  - Monitor endotracheal if appropriate and nasal secretions for changes in amount and color  - [place] appropriate cooling/warming therapies per order  - Administer medications as ordered  - Instruct and encourage patient and family to use good hand hygiene technique  - Identify and instruct in appropriate isolation precautions for identified infection/condition  Outcome: Progressing  Goal: Absence of fever/infection during neutropenic period  Description: INTERVENTIONS:  - Monitor WBC    Outcome: Progressing     Problem: SAFETY ADULT  Goal: Patient will remain free of falls  Description: INTERVENTIONS:  - Educate patient/family on patient safety including physical limitations  - Instruct patient to call for assistance with activity   - Consult OT/PT to assist with strengthening/mobility   - Keep Call bell within reach  - Keep bed low and locked with side rails adjusted as appropriate  - Keep care items and personal belongings within reach  - Initiate and maintain comfort rounds  - Make Fall Risk Sign visible to staff  - Offer Toileting every  Hours, in advance of need  - Initiate/Maintain alarm  - Obtain necessary fall risk management equipment:   - Apply yellow socks and bracelet for high fall risk patients  - Consider moving patient to room near nurses station  Outcome: Progressing  Goal: Maintain or return to baseline ADL function  Description: INTERVENTIONS:  -  Assess patient's ability to carry out ADLs; assess patient's baseline for ADL function and identify physical deficits which impact ability to perform ADLs (bathing, care of mouth/teeth, toileting, grooming, dressing, etc )  - Assess/evaluate cause of self-care deficits   - Assess range of motion  - Assess patient's mobility; develop plan if impaired  - Assess patient's need for assistive devices and provide as appropriate  - Encourage maximum independence but intervene and supervise when necessary  - Involve family in performance of ADLs  - Assess for home care needs following discharge   - Consider OT consult to assist with ADL evaluation and planning for discharge  - Provide patient education as appropriate  Outcome: Progressing  Goal: Maintains/Returns to pre admission functional level  Description: INTERVENTIONS:  - Perform BMAT or MOVE assessment daily    - Set and communicate daily mobility goal to care team and patient/family/caregiver  - Collaborate with rehabilitation services on mobility goals if consulted  - Perform Range of Motion  times a day  - Reposition patient every  hours    - Dangle patient  times a day  - Stand patient  times a day  - Ambulate patient  times a day  - Out of bed to chair  times a day   - Out of bed for bj times a day  - Out of bed for toileting  - Record patient progress and toleration of activity level   Outcome: Progressing     Problem: DISCHARGE PLANNING  Goal: Discharge to home or other facility with appropriate resources  Description: INTERVENTIONS:  - Identify barriers to discharge w/patient and caregiver  - Arrange for needed discharge resources and transportation as appropriate  - Identify discharge learning needs (meds, wound care, etc )  - Arrange for interpretive services to assist at discharge as needed  - Refer to Case Management Department for coordinating discharge planning if the patient needs post-hospital services based on physician/advanced practitioner order or complex needs related to functional status, cognitive ability, or social support system  Outcome: Progressing     Problem: Knowledge Deficit  Goal: Patient/family/caregiver demonstrates understanding of disease process, treatment plan, medications, and discharge instructions  Description: Complete learning assessment and assess knowledge base    Interventions:  - Provide teaching at level of understanding  - Provide teaching via preferred learning methods  Outcome: Progressing

## 2023-05-05 NOTE — PROGRESS NOTES
Per Dr Lorna Rodriguez  Patient is going home today on oral doxycycline  If patient is still here at 1300 give him his scheduled vancomycin 1250 mg dose as per pharmacy vanco consult  Román Roldan, VIKTOR Ph , Pharmacist

## 2023-05-06 LAB
BACTERIA TISS AEROBE CULT: ABNORMAL
BACTERIA TISS AEROBE CULT: ABNORMAL
GRAM STN SPEC: ABNORMAL
GRAM STN SPEC: ABNORMAL

## 2023-05-07 LAB — BACTERIA SPEC ANAEROBE CULT: ABNORMAL

## 2023-05-08 ENCOUNTER — APPOINTMENT (OUTPATIENT)
Dept: PHYSICAL THERAPY | Facility: CLINIC | Age: 68
End: 2023-05-08
Payer: MEDICARE

## 2023-05-08 LAB
BACTERIA SPEC ANAEROBE CULT: ABNORMAL
BACTERIA SPEC ANAEROBE CULT: ABNORMAL

## 2023-05-08 NOTE — TELEPHONE ENCOUNTER
Spoke to patient and provided above information  The area has sealed up now that was leaking  He is wondering when he is to return to Physical Therapy?

## 2023-05-08 NOTE — TELEPHONE ENCOUNTER
Looked at pictures  Looks great  Some drainage is still expected  Monitor and keep covered  Keep completely clean and dry    Do NOT get wet

## 2023-05-08 NOTE — TELEPHONE ENCOUNTER
Spoke with patient and scheduled a hospital follow up for 6/20/23 with Bernard Kim in the [facility]

## 2023-05-08 NOTE — TELEPHONE ENCOUNTER
Called and spoke w/pt and relayed R  Juarez's msg  Pt states he just wanted to know what he was to do and understands that he is not to start PT until after visit on 5/18/23 at [address] [place] [address]  No further questions or concerns

## 2023-05-11 ENCOUNTER — APPOINTMENT (OUTPATIENT)
Dept: PHYSICAL THERAPY | Facility: CLINIC | Age: 68
End: 2023-05-11
Payer: MEDICARE

## 2023-05-13 ENCOUNTER — NURSE TRIAGE (OUTPATIENT)
Dept: OTHER | Facility: OTHER | Age: 68
End: 2023-05-13

## 2023-05-13 NOTE — TELEPHONE ENCOUNTER
As per on call provider patient can either reinforce his dressing or he can be seen in ED over the weekend and call the office on Monday

## 2023-05-13 NOTE — TELEPHONE ENCOUNTER
"  Reason for Disposition  • [1] Clear or blood-tinged fluid draining from wound AND [2] no fever    Answer Assessment - Initial Assessment Questions  1  SYMPTOM: \"What's the main symptom you're concerned about? \" (e g , redness, pain, drainage)      Redness and drainage  2  ONSET: \"When did redness and drainage  start? \"      today  3  SURGERY: \"What surgery was performed? \"      Total shoulder replacement  4  DATE of SURGERY: \"When was surgery performed? \"       5/5  5  INCISION SITE: \"Where is the incision located? \"       Left upper shoulder  6  REDNESS: \"Is there any redness at the incision site? \" If yes, ask: \"How wide across is the redness? \" (Inches, centimeters)       Yes   7  PAIN: \"Is there any pain? \" If Yes, ask: \"How bad is it? \"  (Scale 1-10; or mild, moderate, severe)      No pain  8  BLEEDING: \"Is there any bleeding? \" If Yes, ask: \"How much? \" and \"Where? \"      No   9  DRAINAGE: \"Is there any drainage from the incision site? \" If yes, ask: \"What color and how much? \" (e g , red, cloudy, pus; drops, teaspoon)      Yes yellow   10  FEVER: \"Do you have a fever? \" If Yes, ask: \"What is your temperature, how was it measured, and when did it start? \"        No   11  OTHER SYMPTOMS: \"Do you have any other symptoms? \" (e g , shaking chills, weakness, rash elsewhere on body)        fatigue    Protocols used: POST-OP INCISION SYMPTOMS AND QUESTIONS-ADULT-AH    "

## 2023-05-13 NOTE — TELEPHONE ENCOUNTER
"Regarding: incision red / swelling  ----- Message from Enoc Muñoz sent at 5/13/2023  2:14 PM EDT -----  \"My incision looks red again and there is swelling at the stitches  I just don't want to get another infection again   I've included a picture of it so you could see it\"    "

## 2023-05-15 ENCOUNTER — APPOINTMENT (OUTPATIENT)
Dept: PHYSICAL THERAPY | Facility: CLINIC | Age: 68
End: 2023-05-15
Payer: MEDICARE

## 2023-05-15 ENCOUNTER — APPOINTMENT (OUTPATIENT)
Dept: RADIOLOGY | Facility: OTHER | Age: 68
End: 2023-05-15

## 2023-05-15 ENCOUNTER — OFFICE VISIT (OUTPATIENT)
Dept: OBGYN CLINIC | Facility: OTHER | Age: 68
End: 2023-05-15

## 2023-05-15 VITALS
DIASTOLIC BLOOD PRESSURE: 67 MMHG | HEART RATE: 61 BPM | WEIGHT: 245 LBS | BODY MASS INDEX: 35.07 KG/M2 | SYSTOLIC BLOOD PRESSURE: 114 MMHG | HEIGHT: 70 IN

## 2023-05-15 DIAGNOSIS — Z96.612 S/P REVERSE TOTAL SHOULDER ARTHROPLASTY, LEFT: ICD-10-CM

## 2023-05-15 DIAGNOSIS — Z96.612 S/P REVERSE TOTAL SHOULDER ARTHROPLASTY, LEFT: Primary | ICD-10-CM

## 2023-05-15 DIAGNOSIS — L03.114 CELLULITIS OF LEFT UPPER EXTREMITY: ICD-10-CM

## 2023-05-15 RX ORDER — CEFADROXIL 500 MG/1
500 CAPSULE ORAL EVERY 12 HOURS SCHEDULED
Qty: 14 CAPSULE | Refills: 0 | Status: SHIPPED | OUTPATIENT
Start: 2023-05-15 | End: 2023-05-22 | Stop reason: ALTCHOICE

## 2023-05-15 RX ORDER — DOXYCYCLINE 100 MG/1
100 TABLET ORAL 2 TIMES DAILY
Qty: 14 TABLET | Refills: 0 | Status: SHIPPED | OUTPATIENT
Start: 2023-05-15 | End: 2023-05-22 | Stop reason: ALTCHOICE

## 2023-05-15 RX ORDER — DILTIAZEM HYDROCHLORIDE 180 MG/1
CAPSULE, EXTENDED RELEASE ORAL
COMMUNITY
Start: 2023-05-03 | End: 2023-05-22 | Stop reason: ALTCHOICE

## 2023-05-15 NOTE — PROGRESS NOTES
"Surgery: 5/2/2023 I&D left shoulder with VAC placement    S: Patient presents in follow up of left shoulder  He is 2 weeks from washout and 6 weeks from reverse total shoulder arthroplasty  Reports some mild drainage from the incision and slight erythema around sutures  Denies purulent drainage  Adithya Zuniga was discharged on 10 days of Duricef and Doxy  Yesterday was last dose    /67   Pulse 61   Ht 5' 10\" (1 778 m)   Wt 111 kg (245 lb)   BMI 35 15 kg/m²     O: left shoulder  Incision without erythema  Mild serosanguinous drainage distal incision  Sutures intact   Good elbow wrist and hand range of motion without pain  SI  NVI    I have personally reviewed pertinent films in PACS and my interpretation is stable prosthesis without fracture or dislocation  A/P: 6 weeks s/p left reverse total shoulder arthoplasty and 2 weeks s/p I&D left shoulder  1  Therapy for the left shoulder - hold for now  2  HEP - pendulums  3  Sling - continue until follow up  4  Follow up: 1 week for would check  5   Refill of both antibiotics sent to pharmacy - 1 week supply    "

## 2023-05-16 ENCOUNTER — OFFICE VISIT (OUTPATIENT)
Dept: NEPHROLOGY | Facility: CLINIC | Age: 68
End: 2023-05-16

## 2023-05-16 VITALS
HEIGHT: 70 IN | DIASTOLIC BLOOD PRESSURE: 70 MMHG | SYSTOLIC BLOOD PRESSURE: 140 MMHG | BODY MASS INDEX: 35.93 KG/M2 | WEIGHT: 251 LBS

## 2023-05-16 DIAGNOSIS — E11.22 TYPE 2 DIABETES MELLITUS WITH STAGE 3A CHRONIC KIDNEY DISEASE, WITHOUT LONG-TERM CURRENT USE OF INSULIN (HCC): ICD-10-CM

## 2023-05-16 DIAGNOSIS — I12.9 BENIGN HYPERTENSION WITH CHRONIC KIDNEY DISEASE, STAGE III (HCC): ICD-10-CM

## 2023-05-16 DIAGNOSIS — N17.9 AKI (ACUTE KIDNEY INJURY) (HCC): Primary | ICD-10-CM

## 2023-05-16 DIAGNOSIS — N18.31 TYPE 2 DIABETES MELLITUS WITH STAGE 3A CHRONIC KIDNEY DISEASE, WITHOUT LONG-TERM CURRENT USE OF INSULIN (HCC): ICD-10-CM

## 2023-05-16 DIAGNOSIS — N18.9 CHRONIC KIDNEY DISEASE-MINERAL AND BONE DISORDER: ICD-10-CM

## 2023-05-16 DIAGNOSIS — N18.30 BENIGN HYPERTENSION WITH CHRONIC KIDNEY DISEASE, STAGE III (HCC): ICD-10-CM

## 2023-05-16 DIAGNOSIS — N18.31 CKD STAGE G3A/A1, GFR 45-59 AND ALBUMIN CREATININE RATIO <30 MG/G (HCC): ICD-10-CM

## 2023-05-16 DIAGNOSIS — E66.01 CLASS 2 SEVERE OBESITY DUE TO EXCESS CALORIES WITH SERIOUS COMORBIDITY AND BODY MASS INDEX (BMI) OF 36.0 TO 36.9 IN ADULT (HCC): ICD-10-CM

## 2023-05-16 DIAGNOSIS — M89.9 CHRONIC KIDNEY DISEASE-MINERAL AND BONE DISORDER: ICD-10-CM

## 2023-05-16 DIAGNOSIS — E83.9 CHRONIC KIDNEY DISEASE-MINERAL AND BONE DISORDER: ICD-10-CM

## 2023-05-18 ENCOUNTER — APPOINTMENT (OUTPATIENT)
Dept: PHYSICAL THERAPY | Facility: CLINIC | Age: 68
End: 2023-05-18
Payer: MEDICARE

## 2023-05-18 NOTE — PROGRESS NOTES
NEPHROLOGY OUTPATIENT PROGRESS NOTE   Kristen Watt 69 y o  male MRN: [id number]  DATE: 5/18/2023    Reason for visit:   Chief Complaint   Patient presents with   • Follow-up     Hospital followup; ABILIO        Patient Instructions   Thank you for coming to your visit today  As we discussed you kidney function is back to normal, your creatinine is 1 12mg/dL  Please follow the recommendations below       • Recommend low sodium (salt) food    • Avoid nonsteroidal anti-inflammatory drugs such as Naprosyn, ibuprofen, Aleve, Advil, Celebrex, Meloxicam (Mobic) etc   You can use Tylenol as needed if you do not have any liver condition to be concerned about    • Try to exercise at least 30 minutes 3 days a week to begin with with an ultimate goal of 5 days a week for at least 30 minutes    • Try to lose 5-10 lb by your next visit  Blood Pressure Measurement Instructions:  • Take the morning readings before any medications and when yo are relaxed for several minutes   • Take the evening readings between 7pm and 10pm at least 30 minutes before or after dinner/eating/coffee or alcohol intake and certainly before any blood pressure medications,   • Please include heart rate with your blood pressure readings   • When taking standing readings, keep your arm supported at heart level and not dangling  • Make sure you are sitting with your back supported and feet on the ground and do not cross your legs or feet  • Make sure you have not taken any coffee or caffeine products or exercised or smoke cigarettes at least 30 minutes before taking your blood pressure      You can continue follow up with your PCP    Azael Herman MD  Nephrology Attending             Blanquita Webber was seen today for follow-up      Diagnoses and all orders for this visit:    ABILIO (acute kidney injury) ([facility] [address] )    CKD stage G3a/A1, GFR 45-59 and albumin creatinine ratio <30 mg/g (HCC)    Chronic kidney disease-mineral and bone disorder    Benign hypertension with chronic kidney disease, stage III (HCC)    Type 2 diabetes mellitus with stage 3a chronic kidney disease, without long-term current use of insulin (HCC)    Class 2 severe obesity due to excess calories with serious comorbidity and body mass index (BMI) of 36 0 to 36 9 in adult [facility])        Assessment/Plan:  70 yo man with PMH of HTN, DM, obesity, MEG  Patient was admitted with cellulitis complicated with ABILIO  Patient is here for follow-up after hospital discharge    PLAN:     #Non-Oliguric KDIGO ABILIO stage 2  • Etiology likely secondary to hemodynamic changes in the settings of losartan, dehydration  • Baseline Creatinine 0 9 to 1 2 mg/dL  • Current creatinine: 1 1 mg/dL, back to  • Peak creatinine: 2 56 mg/dL  • UA: No hematuria, no leukocyturia  • Renal imaging no hydronephrosis, normal echogenicity  • Treatment:    #CKD G3a  • Baseline creatinine: 0 9 to 1 2 mg/dL  • Etiology: Likely secondary to nephrosclerosis in the settings of hypertension      #Acid-base Disorder  • serum HCO3 26 mmol/L  • At goal    #Volume status/hypertension:  • Volume: Euvolemic   • Blood pressure: Borderline hypertension, /70mmhg, goal<140/90  • Recommend:  • Low-sodium diet  • Diltiazem 180 mg daily  • Losartan 100 mg daily  • Advised to maintain a good BP control to prevent progression of CKD       #Anemia:  • Current hemoglobin: 12 1 mg/dL  • Medical    #DM  · HbA1c 8 4  · Advised to maintain a good DM control to prevent progression of CKD   • Maintain healthy diet (vegetables, fruits, whole grains, nonfat or low fat)  • Weight loss  • Physical activity (5 to 10 minutes to start the increase to 30 min a day)    #Obesity   • BMI 36 01  • Recommend weight loss , heathy diet  • Lifestyle modification          SUBJECTIVE / INTERVAL HISTORY:  69 y o  male presents in follow up of ABILIO  Patient feels well, no shortness of breath, no chest pain    He was admitted due to cellulitis complicated with ABILIO now back to baseline    Isael MALONE "Joey Proper denies any recent illness/hospitalizations/medication changes since last office visit  Review of Systems   Constitutional: Negative for activity change  HENT: Negative for congestion  Eyes: Negative for discharge  Respiratory: Negative for shortness of breath and stridor  Cardiovascular: Negative for chest pain, palpitations and leg swelling  Gastrointestinal: Negative for abdominal distention and abdominal pain  Endocrine: Negative for cold intolerance  Genitourinary: Negative for dysuria  Musculoskeletal: Negative for arthralgias  Skin: Negative for color change and pallor  Neurological: Negative for dizziness  Psychiatric/Behavioral: Negative for agitation  OBJECTIVE:  /70   Ht 5' 10\" (1 778 m)   Wt 114 kg (251 lb)   BMI 36 01 kg/m²  Body mass index is 36 01 kg/m²  Physical exam:  Physical Exam  General:  no acute distress at this time  Skin:  No acute rash  Eyes:  No scleral icterus and noninjected  ENT:  mucous membranes moist  Neck:  no carotid bruits  Chest:  Clear to auscultation percussion, good respiratory effort, no use of accessory respiratory muscles  CVS:  Regular rate and rhythm without rub  Abdomen:  soft and nontender   Extremities:  , no significant lower extremity edema  Neuro:  No gross focality  Psych:  Alert , cooperative     Medications:    Current Outpatient Medications:   •  cefadroxil (DURICEF) 500 mg capsule, Take 1 capsule (500 mg total) by mouth every 12 (twelve) hours for 7 days, Disp: 14 capsule, Rfl: 0  •  diltiazem (CARDIZEM CD) 180 mg 24 hr capsule, , Disp: , Rfl:   •  doxycycline (ADOXA) 100 MG tablet, Take 1 tablet (100 mg total) by mouth 2 (two) times a day for 7 days, Disp: 14 tablet, Rfl: 0  •  glimepiride (AMARYL) 4 mg tablet, , Disp: , Rfl:   •  Insulin Pen Needle (BD Pen Needle Nayla 2nd Gen) 32G X 4 MM MISC, For use with insulin pen   Pharmacy may dispense brand covered by insurance , Disp: 100 each, Rfl: 0  •  losartan " "(COZAAR) 100 MG tablet, , Disp: , Rfl:   •  Multiple Vitamin (multivitamin) capsule, Take 1 capsule by mouth daily, Disp: 21 capsule, Rfl: 0  •  traZODone (DESYREL) 50 mg tablet, bedtime, Disp: , Rfl:   •  Dilt- MG 24 hr capsule, , Disp: , Rfl:   •  Insulin Glargine Solostar (Lantus SoloStar) 100 UNIT/ML SOPN, Inject 0 4 mL (40 Units total) under the skin daily at bedtime, Disp: , Rfl:   •  oxyCODONE (ROXICODONE) 5 immediate release tablet, 1 tablets every 6 hours as needed for pain , Disp: 13 tablet, Rfl: 0    Allergies: Allergies as of 05/16/2023 - Reviewed 05/16/2023   Allergen Reaction Noted   • Sulfa antibiotics Hives 11/05/2020       The following portions of the patient's history were reviewed and updated as appropriate: past family history, past surgical history and problem list     Laboratory Results:  Lab Results   Component Value Date    SODIUM 138 05/05/2023    K 3 7 05/05/2023     (H) 05/05/2023    CO2 26 05/05/2023    BUN 28 (H) 05/05/2023    CREATININE 1 12 05/05/2023    GLUC 103 05/05/2023    CALCIUM 9 2 05/05/2023        Lab Results   Component Value Date    CALCIUM 9 2 05/05/2023       Portions of the record may have been created with voice recognition software  Occasional wrong word or \"sound a like\" substitutions may have occurred due to the inherent limitations of voice recognition software  Read the chart carefully and recognize, using context, where substitutions have occurred      "

## 2023-05-22 ENCOUNTER — OFFICE VISIT (OUTPATIENT)
Dept: OBGYN CLINIC | Facility: OTHER | Age: 68
End: 2023-05-22

## 2023-05-22 VITALS
HEART RATE: 94 BPM | SYSTOLIC BLOOD PRESSURE: 115 MMHG | DIASTOLIC BLOOD PRESSURE: 64 MMHG | BODY MASS INDEX: 35.07 KG/M2 | HEIGHT: 70 IN | WEIGHT: 245 LBS

## 2023-05-22 DIAGNOSIS — Z96.612 S/P REVERSE TOTAL SHOULDER ARTHROPLASTY, LEFT: Primary | ICD-10-CM

## 2023-05-22 NOTE — PROGRESS NOTES
"Surgery: reverse total shoulder arthroplasty on 4/4/2023 with subsequent I&D left shoulder with incisional vac placement on 5/2/2023    S: Patient denies fever or chills  Has been compliant with antibiotics as prescribed - finished last night  Admits to mild serosanguinous drainage over weekend  No new injury or trauma     5' 10\" (1 778 m) Comment: Verbal  Wt 111 kg (245 lb)   BMI 35 15 kg/m²     O: left shoulder  Incision without erythema or active drainage  Sutures removed in the office  Passive FF 90  Good elbow wrist and hand range of motion without pain  SI  NVI    A/P: 3 weeks s/p I&D left shoulder with incisional VAC placement  1  May resume formal PT for the left shoulder to restore function with gentle motion  2  Sling can be discontinued  3  Driving - personal decision  4  Tylenol and Ice PRN pain  5   Follow up : 4 weeks - sooner if problems arise    "

## 2023-05-25 ENCOUNTER — EVALUATION (OUTPATIENT)
Dept: PHYSICAL THERAPY | Facility: CLINIC | Age: 68
End: 2023-05-25

## 2023-05-25 DIAGNOSIS — Z96.612 S/P REVERSE TOTAL SHOULDER ARTHROPLASTY, LEFT: Primary | ICD-10-CM

## 2023-05-25 DIAGNOSIS — M19.012 PRIMARY OSTEOARTHRITIS OF LEFT SHOULDER: ICD-10-CM

## 2023-05-25 NOTE — PROGRESS NOTES
PT Re-Evaluation      Today's date: 2023  Patient name: Rober Hilton  : 1955  MRN: [id number]  Referring provider: Vandana Lin  Dx:   Encounter Diagnosis     ICD-10-CM    1  S/P reverse total shoulder arthroplasty, left  Z96 [id number]       2  Primary osteoarthritis of left shoulder  M19 012                      Assessment  Assessment details: Rober Hilton is a 69 y o  male who returns from 1 month hold on therapy due to complications w/ incisional infection leading to irrigation and debridement of wound perform on 23  Patient was released from sling on 23  He presents with continued pain, decreased strength, decreased ROM, decreased joint mobility and postural dysfunction  Due to these impairments, patient has difficulty performing ADL's, recreational activities, engaging in social activities, lifting/carrying, reaching  Patient's clinical presentation is consistent with their referring diagnosis of s/p L Reverse TSA secondary to Chronic left shoulder pain  (primary encounter diagnosis) due to OA  Patient has been educated in post-op contraindications / precautions and surgical procedure/protocol, home exercise program and plan of care  Patient would benefit from skilled physical therapy services to address their aforementioned functional limitations and progress towards prior level of function and independence with home exercise program  Patient was instructed to contact his surgeon due to warmth/redness noted around incision at session today     Impairments: abnormal or restricted ROM, abnormal movement, activity intolerance, impaired physical strength, lacks appropriate home exercise program, pain with function, poor posture  and poor body mechanics    Goals  Short Term Goals to be accomplished in 4 weeks   STG1: Pt will be I with HEP to maximize progress between therapy sessions  STG2: Pt will be I with posture management   STG3: Pt will demo inc in shoulder AROM to improve self care and household ADLs  STG5: Pt will report pain 5/10 at worst in shoulder     Long Term Goals to be accomplished in 8 weeks   LTG1: Pt will demo L shldr AROM to [facility] including flex: 120 deg, ER 50 deg  LTG2: Pt will return to work/household ADLs pain free as per PLOF  LTG3: Pt will demo shoulder strength WFL to allow lifting/carrying of 3# items  Plan  Plan details: HEP development, stretching, strengthening, A/AA/PROM, joint mobilizations, posture education, STM/MI as needed to reduce muscle tension, muscle reeducation, PLOC discussed and agreed upon with patient  Patient would benefit from: PT eval and skilled physical therapy  Planned modality interventions: cryotherapy and thermotherapy: hydrocollator packs  Planned therapy interventions: home exercise program, therapeutic exercise, therapeutic activities, self care, patient education, manual therapy and neuromuscular re-education  Frequency: 2x week (after TSA on 23)  Plan of Care beginning date: 3/20/2023  Plan of Care expiration date: 2023  Treatment plan discussed with: patient        Subjective Evaluation    History of Present Illness  Mechanism of injury: Patient returns after 1 month hold on therapy due to infection of incision  On 23 he underwent an incision/drainage and wound vac placement operation  States he has been back to see ortho and was allowed to dc sling this week  Reports areas of redness and drainage from his incision new this week  Patient presents s/p L reverse TSA performed on 23 after several years of shoulder pain and limited function  Patient reports pain has been tolerable  States he is taking pain medication at night     Pain  Current pain ratin  At best pain ratin  At worst pain ratin  Location: top of shoulder, between shoulder blades   Quality: discomfort, knife-like and sharp  Relieving factors: change in position  Aggravating factors: lifting and overhead activity (most activities involving L arm, putting on coat, tucking in shirt, closing car door, putitng on cpap, driving tractor)  Progression: no change    Social Support    Employment status: working (office work )  Hand dominance: right  Exercise history: boating, farming     Treatments  Previous treatment: injection treatment and physical therapy  Patient Goals  Patient goals for therapy: decreased pain, increased strength, independence with ADLs/IADLs, return to sport/leisure activities and increased motion  Patient goal: return to ADL's         Objective       Posture: forward head w/ rounded shoulders, L scap depression   Patient in abduction sling-corrected positioning of abduction pillow w/ pt educ    Observation: Patient w/ ecchymosis and bruising upper arm/shoulder, covered w/ bandages intact   Incision red with warmth noted, area of swelling noted near proximal incision, mild drainage noted from distal portion of incision  AROM: standing R   L  Shoulder flex        150   60 w/ scap hike  Shoulder abd       145   50 w/ scap hike  Functional ER  Behind head  TBA  Functional IR  Sacrum  TBA     PROM:  R   L  Shoulder flex        160   105  Shoulder abd       160   60  ER @ neutral abd 60   35  IR @ neutral abd 60   To stomach  Elbow flex  135   135  Elbow ext  0   0    Cervical AROM loss: WNL     MMT:    R   L     Shoulder flex  4/5*   TBA  Shoulder abd  4/5*   TBA  IR   5/5   TBA  ER   4-/5*   TBA  Elbow flex  5/5   3-/5  Elbow ext  5/5   3-/5    Mechanical assessment:   N/A    Tenderness/Palpation:  WNL    Joint Mobility:   TBA        Precautions: PROTOCOL  Abduction sling for at least 2-4 weeks  Past Medical History:   Diagnosis Date   • Arrhythmia    • CPAP (continuous positive airway pressure) dependence    • Diabetes mellitus ([facility] [address] )    • History of echocardiogram 11/14/2017    showed EF of 50-55 percentWith moderate LVH and left ventricle diastolic dysfunction  Left atrium was moderately enlarged   Trace MR "noted  • History of Holter monitoring 11/21/2017    showed baseline rhythm of sinus origin with an average heart it of 61 bpm  The lowest heart rate was 49 and the highest heart rate was 10 8 bpm  There were rare single VPCs, and frequent PACs representing 3 2% of total beats  There were several episodes of sinus arrhythmias with sinus bradycardia and heart rate ranging from 40-90 bpm  No sustained dysrhythmias, or pauses noted  The patient did not   • Hypertension    • Liver disease    • Obese    • Sleep apnea    SOC: 3/20/222   FOTO: TBA on post op IE  POC Expiration: 6/12/2022  Daily Treatment Log:  Date 4/24/23 4/27/23 5/25/23     Visit # 6 7 FOTO 8 FOTO/RE     Manual                        Ther Exer 15' 45'      Pendulums Circles 20xea  AP/ML 20xa Circles 20xea  AP/ML 20xea Circles 20xea  AP/ML 20xea     Deltoid isos 2x10 5\" 2x10 5\"       Scap retraction Seated 2x10        Elbow flex/ext 3x10 std  3x10 std 2x10      Table slides flexion/scap 2x10 5\" ea 2x10 5\" ea       Supine ER w/ cane 2x10 supine  10x10\"  1x10 5\"      Supination B/L 2x10 Std  W/ hammer 2x10 L only  W/ hammer L only 2x10      Sup flex w/ opp UE 2x10  2x10  1x10      Std scaption AAROM w/ cane 2x10  2x10  2x10      Std flexion AAROM w/ cane   1x10  2x10     Std bilat ER AROM  2x10  2x10     Shldr abd iso w/ ball   1x10                                       HEP   Updated      Ther Activ                                                        NMReed        Gripper  2x10 yellow 20# 2x10 yellow 20#                                       Modalities        CP                          Access Code: [email or web address]  URL: [email or web address]  Date: 05/25/2023  Prepared by: Winnie Hinson    Exercises  - Isometric Shoulder Flexion at Wall  - 1-2 x daily - 7 x weekly - 1 sets - 10 reps - 5 hold  - Circular Shoulder Pendulum with Table Support  - 1-2 x daily - 7 x weekly - 2 sets - 10 reps  - Seated Bilateral Shoulder Flexion Towel Slide at Table Top " - 1 x daily - 7 x weekly - 2 sets - 10 reps - 5 hold  - Standing Elbow Flexion Extension AROM  - 1 x daily - 7 x weekly - 2 sets - 10 reps  - Seated Forearm Pronation and Supination AROM  - 1 x daily - 7 x weekly - 2 sets - 10 reps  - Supine Shoulder External Rotation with Dowel  - 1 x daily - 7 x weekly - 2 sets - 10 reps - 5 hold  - Standing Shoulder Abduction ROM with Dowel  - 1 x daily - 7 x weekly - 2 sets - 10 reps  - Shoulder Flexion Overhead with Dowel  - 1 x daily - 7 x weekly - 2 sets - 10 reps  - Shoulder External Rotation and Scapular Retraction  - 1 x daily - 7 x weekly - 2 sets - 10 reps

## 2023-05-30 ENCOUNTER — OFFICE VISIT (OUTPATIENT)
Dept: PODIATRY | Facility: CLINIC | Age: 68
End: 2023-05-30

## 2023-05-30 ENCOUNTER — OFFICE VISIT (OUTPATIENT)
Dept: PHYSICAL THERAPY | Facility: CLINIC | Age: 68
End: 2023-05-30

## 2023-05-30 VITALS
SYSTOLIC BLOOD PRESSURE: 128 MMHG | DIASTOLIC BLOOD PRESSURE: 72 MMHG | HEIGHT: 70 IN | WEIGHT: 245 LBS | BODY MASS INDEX: 35.07 KG/M2 | HEART RATE: 73 BPM

## 2023-05-30 DIAGNOSIS — E11.40 TYPE 2 DIABETES MELLITUS WITH DIABETIC NEUROPATHY, WITH LONG-TERM CURRENT USE OF INSULIN (HCC): ICD-10-CM

## 2023-05-30 DIAGNOSIS — M19.012 PRIMARY OSTEOARTHRITIS OF LEFT SHOULDER: ICD-10-CM

## 2023-05-30 DIAGNOSIS — Z96.612 S/P REVERSE TOTAL SHOULDER ARTHROPLASTY, LEFT: Primary | ICD-10-CM

## 2023-05-30 DIAGNOSIS — L97.521 ULCER OF LEFT FOOT, LIMITED TO BREAKDOWN OF SKIN (HCC): Primary | ICD-10-CM

## 2023-05-30 DIAGNOSIS — Z79.4 TYPE 2 DIABETES MELLITUS WITH DIABETIC NEUROPATHY, WITH LONG-TERM CURRENT USE OF INSULIN (HCC): ICD-10-CM

## 2023-05-30 NOTE — PROGRESS NOTES
Patient ID: Mc Segura is a 69 y o  male Date of Birth 1955       Assessment:    No problem-specific Assessment & Plan notes found for this encounter  Diagnoses and all orders for this visit:    Ulcer of left foot, limited to breakdown of skin ([address] )  -     Diabetic Shoe Inserts  -     Diabetic Shoe    Type 2 diabetes mellitus with diabetic neuropathy, with long-term current use of insulin ([address] )  -     Diabetic Shoe Inserts  -     Diabetic Shoe          Procedures      Plan:  1  Reviewed medical records  Reviewed recent blood work  2  Continue horse shoe pad  Continue diabetic shoes/ carbon fiber insole  Instructed skin care and protection  3  Sent him for new diabetic shoes  4  Educated disease prevention and risks related to diabetes  Educated proper daily foot care and exam   Instructed proper skin care / protection and footwear  Instructed to identify any signs of infection and related foot problem  5  RA in 6 weeks  I have spent a total time of 20 minutes on 05/30/23 in caring for this patient including Prognosis, Patient and family education, Risk factor reductions, Counseling / Coordination of care, Documenting in the medical record and Reviewing / ordering tests, medicine, procedures    Subjective:        RYAN Kirkland presents for evaluation of left foot  He feels well  No drainage  No pain  BS under control  No new complaint  The following portions of the patient's history were reviewed and updated as appropriate: allergies, current medications, past family history, past medical history, past social history, past surgical history and problem list       PAST MEDICAL HISTORY:  Past Medical History:   Diagnosis Date   • Arrhythmia    • CPAP (continuous positive airway pressure) dependence    • Diabetes mellitus ([address] )    • History of echocardiogram 11/14/2017    showed EF of 50-55 percentWith moderate LVH and left ventricle diastolic dysfunction   Left atrium was moderately enlarged  Trace MR noted  • History of Holter monitoring 11/21/2017    showed baseline rhythm of sinus origin with an average heart it of 61 bpm  The lowest heart rate was 49 and the highest heart rate was 10 8 bpm  There were rare single VPCs, and frequent PACs representing 3 2% of total beats  There were several episodes of sinus arrhythmias with sinus bradycardia and heart rate ranging from 40-90 bpm  No sustained dysrhythmias, or pauses noted  The patient did not   • Hypertension    • Liver disease    • Obese    • Sleep apnea        PAST SURGICAL HISTORY:  Past Surgical History:   Procedure Laterality Date   • CATARACT EXTRACTION     • EYE SURGERY Left 1997   • HERNIA REPAIR  [id number]   • KNEE ARTHROPLASTY Right 2008   • IL ARTHROPLASTY GLENOHUMERAL JOINT TOTAL SHOULDER Left 4/4/2023    Procedure: ARTHROPLASTY SHOULDER REVERSE;  Surgeon: Adin Ryan MD;  Location: [facility];  Service: Orthopedics   • SHOULDER SURGERY Right 2002   • WOUND DEBRIDEMENT Left 5/2/2023    Procedure: INCISION AND DRAINAGE (I&D) EXTREMITY, vac placement;  Surgeon: Adin Ryan MD;  Location: [facility];  Service: Orthopedics        ALLERGIES:  Sulfa antibiotics    MEDICATIONS:  Current Outpatient Medications   Medication Sig Dispense Refill   • diltiazem (CARDIZEM CD) 180 mg 24 hr capsule      • glimepiride (AMARYL) 4 mg tablet      • Insulin Glargine Solostar (Lantus SoloStar) 100 UNIT/ML SOPN Inject 0 4 mL (40 Units total) under the skin daily at bedtime     • Insulin Pen Needle (BD Pen Needle Nayla 2nd Gen) 32G X 4 MM MISC For use with insulin pen  Pharmacy may dispense brand covered by insurance  100 each 0   • losartan (COZAAR) 100 MG tablet      • Multiple Vitamin (multivitamin) capsule Take 1 capsule by mouth daily 21 capsule 0   • traZODone (DESYREL) 50 mg tablet bedtime       No current facility-administered medications for this visit         SOCIAL HISTORY:  Social History Socioeconomic History   • Marital status: /Civil Union     Spouse name: None   • Number of children: 2   • Years of education: 16   • Highest education level: Some college, no degree   Occupational History   • None   Tobacco Use   • Smoking status: Former     Packs/day: 0 50     Years: 20 00     Total pack years: 10 00     Types: Cigarettes     Quit date:      Years since quittin 4   • Smokeless tobacco: Never   Vaping Use   • Vaping Use: Never used   Substance and Sexual Activity   • Alcohol use: Not Currently     Comment: quit    • Drug use: Never   • Sexual activity: Not Currently   Other Topics Concern   • None   Social History Narrative    · Most recent tobacco use screenin2018      · Do you currently or have you served in the [employer]:   No      · Live alone or with others:   with others      · High blood pressure: Yes      · Exercise level:   Occasional      · Overweight:   Yes      · Obese: Yes      · Diabetes:   Yes      Social Determinants of Health     Financial Resource Strain: Not on file   Food Insecurity: No Food Insecurity (2023)    Hunger Vital Sign    • Worried About Running Out of Food in the Last Year: Never true    • Ran Out of Food in the Last Year: Never true   Transportation Needs: No Transportation Needs (2023)    PRAPARE - Transportation    • Lack of Transportation (Medical): No    • Lack of Transportation (Non-Medical): No   Physical Activity: Not on file   Stress: Not on file   Social Connections: Not on file   Intimate Partner Violence: Not on file   Housing Stability: Low Risk  (2023)    Housing Stability Vital Sign    • Unable to Pay for Housing in the Last Year: No    • Number of Places Lived in the Last Year: 1    • Unstable Housing in the Last Year: No      Review of Systems   Constitutional: Negative for chills and fever  Respiratory: Negative for shortness of breath  Cardiovascular: Negative for chest pain  "  Gastrointestinal: Negative for diarrhea, nausea and vomiting  Musculoskeletal: Negative for gait problem  Neurological: Positive for numbness  Objective:            /72   Pulse 73   Ht 5' 10\" (1 778 m)   Wt 111 kg (245 lb)   BMI 35 15 kg/m²     Physical Exam  Vitals reviewed  Constitutional:       General: He is not in acute distress  Appearance: Normal appearance  He is not ill-appearing or toxic-appearing  Cardiovascular:      Rate and Rhythm: Normal rate and regular rhythm  Pulses: Normal pulses  Pulmonary:      Effort: Pulmonary effort is normal  No respiratory distress  Musculoskeletal:         General: Deformity present  No tenderness or signs of injury  Skin:     General: Skin is warm  Coloration: Skin is not cyanotic or mottled  Findings: No abscess, erythema or rash  Nails: There is no clubbing  Comments: Wound remains healed with minimal keratosis left submet 2  No signs of infection  Neurological:      General: No focal deficit present  Mental Status: He is alert and oriented to person, place, and time  Cranial Nerves: No cranial nerve deficit  Sensory: Sensory deficit present  Coordination: Coordination normal    Psychiatric:         Mood and Affect: Mood normal          Behavior: Behavior normal          Thought Content:  Thought content normal          Judgment: Judgment normal            "

## 2023-05-30 NOTE — PROGRESS NOTES
Daily Note     Today's date: 2023  Patient name: Laureen Mario  : 1955  MRN: [id number]  Referring provider: Paxton Carrera  Dx:   Encounter Diagnosis     ICD-10-CM    1  S/P reverse total shoulder arthroplasty, left  Z96 612       2  Primary osteoarthritis of left shoulder  M19 012                      Subjective: Patient reports his shoulder is feeling good, notes his incision began to look better later in the day after his last session  Objective: See treatment diary below; incision is closed w/o areas of swelling-much improved compared to prior session       Assessment: Tolerated treatment well with halle difficulty noted during ball circles vs wall  PROM has improved, AAROM remains limited flexion>abduction  Patient demonstrated fatigue post treatment, exhibited good technique with therapeutic exercises and would benefit from continued PT      Plan: Continue per plan of care  Precautions: PROTOCOL  Abduction sling for at least 2-4 weeks  Past Medical History:   Diagnosis Date   • Arrhythmia    • CPAP (continuous positive airway pressure) dependence    • Diabetes mellitus ([facility] [address] )    • History of echocardiogram 2017    showed EF of 50-55 percentWith moderate LVH and left ventricle diastolic dysfunction  Left atrium was moderately enlarged  Trace MR noted  • History of Holter monitoring 2017    showed baseline rhythm of sinus origin with an average heart it of 61 bpm  The lowest heart rate was 49 and the highest heart rate was 10 8 bpm  There were rare single VPCs, and frequent PACs representing 3 2% of total beats  There were several episodes of sinus arrhythmias with sinus bradycardia and heart rate ranging from 40-90 bpm  No sustained dysrhythmias, or pauses noted   The patient did not   • Hypertension    • Liver disease    • Obese    • Sleep apnea    SOC: 3/20/222   FOTO: TBA on post op IE  POC Expiration: 2022  Daily Treatment Log:  Date 23 "5/25/23 5/30/23    Visit # 6 7 FOTO 8 FOTO/RE 9    Manual                        Ther Exer 15' 45'  40'    Pulleys    3'     Pendulums Circles 20xea  AP/ML 20xa Circles 20xea  AP/ML 20xea Circles 20xea  AP/ML 20xea 1# 20xea circles/AP/ML    Deltoid isos 2x10 5\" 2x10 5\"       Scap retraction Seated 2x10        Elbow flex/ext 3x10 std  3x10 std 2x10  2# 2x10 bilat    Table slides flexion/scap 2x10 5\" ea 2x10 5\" ea       Supine ER w/ cane 2x10 supine  10x10\"  1x10 5\"  10\"x10     Supination B/L 2x10 Std  W/ hammer 2x10 L only  W/ hammer L only 2x10  2x10     Sup flex w/ opp UE 2x10  2x10  1x10  5\"x10     Std scaption AAROM w/ cane 2x10  2x10  2x10  Abduction 2x10     Std flexion AAROM w/ cane   1x10  2x10 2x10     Std bilat ER AROM  2x10  2x10 2x10     Shldr abd iso w/ ball   1x10       Ball up table flex    5\"x10     S/L flexion    1x10; 2x5                    HEP   Updated      Ther Activ                                                        NMReed        Gripper  2x10 yellow 20# 2x10 yellow 20#   2x10 25# yellow    Ball on wall     Circles @ 60deg flex 10xea                            Modalities        CP                          Access Code: [email or web address]  URL: [email or web address]  Date: 05/25/2023  Prepared by: Liam Hurst    Exercises  - Isometric Shoulder Flexion at Wall  - 1-2 x daily - 7 x weekly - 1 sets - 10 reps - 5 hold  - Circular Shoulder Pendulum with Table Support  - 1-2 x daily - 7 x weekly - 2 sets - 10 reps  - Seated Bilateral Shoulder Flexion Towel Slide at Table Top  - 1 x daily - 7 x weekly - 2 sets - 10 reps - 5 hold  - Standing Elbow Flexion Extension AROM  - 1 x daily - 7 x weekly - 2 sets - 10 reps  - Seated Forearm Pronation and Supination AROM  - 1 x daily - 7 x weekly - 2 sets - 10 reps  - Supine Shoulder External Rotation with Dowel  - 1 x daily - 7 x weekly - 2 sets - 10 reps - 5 hold  - Standing Shoulder Abduction ROM with Dowel  - 1 x daily - 7 x weekly - 2 sets - 10 reps  - " Shoulder Flexion Overhead with Dowel  - 1 x daily - 7 x weekly - 2 sets - 10 reps  - Shoulder External Rotation and Scapular Retraction  - 1 x daily - 7 x weekly - 2 sets - 10 reps

## 2023-06-01 ENCOUNTER — OFFICE VISIT (OUTPATIENT)
Dept: PHYSICAL THERAPY | Facility: CLINIC | Age: 68
End: 2023-06-01

## 2023-06-01 DIAGNOSIS — Z96.612 S/P REVERSE TOTAL SHOULDER ARTHROPLASTY, LEFT: Primary | ICD-10-CM

## 2023-06-01 DIAGNOSIS — M19.012 PRIMARY OSTEOARTHRITIS OF LEFT SHOULDER: ICD-10-CM

## 2023-06-01 NOTE — PROGRESS NOTES
Daily Note     Today's date: 2023  Patient name: Justino Benitez  : 1955  MRN: [id number]  Referring provider: Pedro Stratton  Dx:   Encounter Diagnosis     ICD-10-CM    1  S/P reverse total shoulder arthroplasty, left  Z96 612       2  Primary osteoarthritis of left shoulder  M19 012                      Subjective: Patient reports he was sore after last session, lasted until the next day  Objective: See treatment diary below      Assessment: Tolerated treatment well  Attempted wall slides today with poor form and shldr dysfunction noted, modified to AROM supine and sidelying with improved tolerance and form  Patient demonstrated fatigue post treatment, exhibited good technique with therapeutic exercises and would benefit from continued PT      Plan: Continue per plan of care  Precautions: PROTOCOL  Abduction sling for at least 2-4 weeks  Past Medical History:   Diagnosis Date   • Arrhythmia    • CPAP (continuous positive airway pressure) dependence    • Diabetes mellitus ([facility] [address] )    • History of echocardiogram 2017    showed EF of 50-55 percentWith moderate LVH and left ventricle diastolic dysfunction  Left atrium was moderately enlarged  Trace MR noted  • History of Holter monitoring 2017    showed baseline rhythm of sinus origin with an average heart it of 61 bpm  The lowest heart rate was 49 and the highest heart rate was 10 8 bpm  There were rare single VPCs, and frequent PACs representing 3 2% of total beats  There were several episodes of sinus arrhythmias with sinus bradycardia and heart rate ranging from 40-90 bpm  No sustained dysrhythmias, or pauses noted   The patient did not   • Hypertension    • Liver disease    • Obese    • Sleep apnea    SOC: 3/20/222   FOTO: TBA on post op IE  POC Expiration: 23  Daily Treatment Log:  Date 23   Visit # 6 7 FOTO 8 FOTO/RE 9 10   Manual                        Ther Exer 15' 45'  40' "36'   Pulleys    3'  5'   Pendulums Circles 20xea  AP/ML 20xa Circles 20xea  AP/ML 20xea Circles 20xea  AP/ML 20xea 1# 20xea circles/AP/ML 1# 20xea   Deltoid isos 2x10 5\" 2x10 5\"       Scap retraction Seated 2x10        Elbow flex/ext 3x10 std  3x10 std 2x10  2# 2x10 bilat 2# 2x10 bilat   Table slides flexion/scap 2x10 5\" ea 2x10 5\" ea       Supine ER w/ cane 2x10 supine  10x10\"  1x10 5\"  10\"x10     Supination B/L 2x10 Std  W/ hammer 2x10 L only  W/ hammer L only 2x10  W/ hammer 2x10  W/ hammer 2x10    Sup flex w/ opp UE 2x10  2x10  1x10  5\"x10     Std scaption AAROM w/ cane 2x10  2x10  2x10  Abduction 2x10  2x10    Std flexion AAROM w/ cane   1x10  2x10 2x10  2x10    Std bilat ER AROM  2x10  2x10 2x10  2x10    Shldr abd iso w/ ball   1x10       Ball up table flex    5\"x10     S/L flexion    1x10; 2x5 2x10    Wall slides     2x5 -poor form   Sup AROM flex     2x5    S/L ER     2x10    S/L abduction     2x5            HEP   Updated      Ther Activ                                                        NMReed        Gripper  2x10 yellow 20# 2x10 yellow 20#   2x10 25# yellow 2x10 25# yellow   Ball on wall     Circles @ 60deg flex 10xea                            Modalities        CP                          Access Code: [email or web address]  URL: [email or web address]  Date: 05/25/2023  Prepared by: Effie Watson    Exercises  - Isometric Shoulder Flexion at Wall  - 1-2 x daily - 7 x weekly - 1 sets - 10 reps - 5 hold  - Circular Shoulder Pendulum with Table Support  - 1-2 x daily - 7 x weekly - 2 sets - 10 reps  - Seated Bilateral Shoulder Flexion Towel Slide at Table Top  - 1 x daily - 7 x weekly - 2 sets - 10 reps - 5 hold  - Standing Elbow Flexion Extension AROM  - 1 x daily - 7 x weekly - 2 sets - 10 reps  - Seated Forearm Pronation and Supination AROM  - 1 x daily - 7 x weekly - 2 sets - 10 reps  - Supine Shoulder External Rotation with Dowel  - 1 x daily - 7 x weekly - 2 sets - 10 reps - 5 hold  - Standing " Shoulder Abduction ROM with Dowel  - 1 x daily - 7 x weekly - 2 sets - 10 reps  - Shoulder Flexion Overhead with Dowel  - 1 x daily - 7 x weekly - 2 sets - 10 reps  - Shoulder External Rotation and Scapular Retraction  - 1 x daily - 7 x weekly - 2 sets - 10 reps

## 2023-06-01 NOTE — ASSESSMENT & PLAN NOTE
Recent Labs     05/17/22  1527 05/18/22  0434   PLT 66* 70*     Chronic, likely in the setting of alcoholic cirrhosis  Slightly improved today  Will continue monitoring during admission No

## 2023-06-05 ENCOUNTER — OFFICE VISIT (OUTPATIENT)
Dept: PHYSICAL THERAPY | Facility: CLINIC | Age: 68
End: 2023-06-05
Payer: MEDICARE

## 2023-06-05 DIAGNOSIS — Z96.612 S/P REVERSE TOTAL SHOULDER ARTHROPLASTY, LEFT: Primary | ICD-10-CM

## 2023-06-05 DIAGNOSIS — M19.012 PRIMARY OSTEOARTHRITIS OF LEFT SHOULDER: ICD-10-CM

## 2023-06-05 PROCEDURE — 97110 THERAPEUTIC EXERCISES: CPT

## 2023-06-05 NOTE — PROGRESS NOTES
Daily Note     Today's date: 2023  Patient name: Dannie Ann  : 1955  MRN: [id number]  Referring provider: Boby Saleh  Dx:   Encounter Diagnosis     ICD-10-CM    1  S/P reverse total shoulder arthroplasty, left  Z96 612       2  Primary osteoarthritis of left shoulder  M19 012                      Subjective: Patient reports he feels good today no noted soreness after last session  Objective: See treatment diary below      Assessment: Tolerated treatment well  Flexion AROM remains difficulty, noted improvement w/ ER ROM  Patient demonstrated fatigue post treatment, exhibited good technique with therapeutic exercises and would benefit from continued PT      Plan: Continue per plan of care  Precautions: PROTOCOL  Abduction sling for at least 2-4 weeks  Past Medical History:   Diagnosis Date   • Arrhythmia    • CPAP (continuous positive airway pressure) dependence    • Diabetes mellitus ([facility] [address] )    • History of echocardiogram 2017    showed EF of 50-55 percentWith moderate LVH and left ventricle diastolic dysfunction  Left atrium was moderately enlarged  Trace MR noted  • History of Holter monitoring 2017    showed baseline rhythm of sinus origin with an average heart it of 61 bpm  The lowest heart rate was 49 and the highest heart rate was 10 8 bpm  There were rare single VPCs, and frequent PACs representing 3 2% of total beats  There were several episodes of sinus arrhythmias with sinus bradycardia and heart rate ranging from 40-90 bpm  No sustained dysrhythmias, or pauses noted   The patient did not   • Hypertension    • Liver disease    • Obese    • Sleep apnea    SOC: 3/20/222   FOTO: TBA on post op IE  POC Expiration: 23  Daily Treatment Log:  Date     23   Visit #     10   Manual                        Ther Exer     40'   Pulleys 3'    5'   Pendulums 1# 20xea    1# 20xea   Bi curl bilat  2#    2# 2x10 bilat   Supination hammer 2x10     W/ hammer 2x10 Std abd  AAROM w/ cane 2x10    2x10    Std flexion AAROM w/ cane  1x10     2x10    Std bilat ER AROM 2x10     2x10    S/L flexion 2x10    2x10    Sup AROM flex 2x5    2x5    S/L ER     2x10    S/L abduction 2x10    2x5            HEP        Ther Activ                                                        NMReed        Gripper  2x10 25 # yellow    2x10 25# yellow   Ball on wall         Rows tube Blue 2x10        Shldr ext tube  Blue 2x10                Modalities        CP                          Access Code: [email or web address]  URL: [email or web address]  Date: 05/25/2023  Prepared by: Buzz Doan    Exercises  - Isometric Shoulder Flexion at Wall  - 1-2 x daily - 7 x weekly - 1 sets - 10 reps - 5 hold  - Circular Shoulder Pendulum with Table Support  - 1-2 x daily - 7 x weekly - 2 sets - 10 reps  - Seated Bilateral Shoulder Flexion Towel Slide at Table Top  - 1 x daily - 7 x weekly - 2 sets - 10 reps - 5 hold  - Standing Elbow Flexion Extension AROM  - 1 x daily - 7 x weekly - 2 sets - 10 reps  - Seated Forearm Pronation and Supination AROM  - 1 x daily - 7 x weekly - 2 sets - 10 reps  - Supine Shoulder External Rotation with Dowel  - 1 x daily - 7 x weekly - 2 sets - 10 reps - 5 hold  - Standing Shoulder Abduction ROM with Dowel  - 1 x daily - 7 x weekly - 2 sets - 10 reps  - Shoulder Flexion Overhead with Dowel  - 1 x daily - 7 x weekly - 2 sets - 10 reps  - Shoulder External Rotation and Scapular Retraction  - 1 x daily - 7 x weekly - 2 sets - 10 reps

## 2023-06-08 ENCOUNTER — OFFICE VISIT (OUTPATIENT)
Dept: PHYSICAL THERAPY | Facility: CLINIC | Age: 68
End: 2023-06-08
Payer: MEDICARE

## 2023-06-08 DIAGNOSIS — Z96.612 S/P REVERSE TOTAL SHOULDER ARTHROPLASTY, LEFT: Primary | ICD-10-CM

## 2023-06-08 DIAGNOSIS — M19.012 PRIMARY OSTEOARTHRITIS OF LEFT SHOULDER: ICD-10-CM

## 2023-06-08 PROCEDURE — 97110 THERAPEUTIC EXERCISES: CPT

## 2023-06-08 NOTE — PROGRESS NOTES
Daily Note     Today's date: 2023  Patient name: Leonel Caceres  : 1955  MRN: [id number]  Referring provider: Nitin Torres  Dx:   Encounter Diagnosis     ICD-10-CM    1  S/P reverse total shoulder arthroplasty, left  Z96 612       2  Primary osteoarthritis of left shoulder  M19 012                      Subjective: pt has no new complaints on arrival, no noted soreness with PT  He drove to [place] and used his L UE to drive when his R UE got tired and it went well  Objective: See treatment diary below      Assessment: Tolerated treatment well  Pt limited in standing AROM due to weakness>pain at this point presenting with some compensatory movements, tolerating AAROM in both planes  Cont to progress ROM and strengthening as tolerated per symptom irritability  Pt notes improvement with the ease of his AROM compared to previous visits, dem fatigue with AROM  Patient would benefit from continued PT      Plan: Continue per plan of care  Precautions: PROTOCOL  Abduction sling for at least 2-4 weeks  Past Medical History:   Diagnosis Date   • Arrhythmia    • CPAP (continuous positive airway pressure) dependence    • Diabetes mellitus ([facility] [address] )    • History of echocardiogram 2017    showed EF of 50-55 percentWith moderate LVH and left ventricle diastolic dysfunction  Left atrium was moderately enlarged  Trace MR noted  • History of Holter monitoring 2017    showed baseline rhythm of sinus origin with an average heart it of 61 bpm  The lowest heart rate was 49 and the highest heart rate was 10 8 bpm  There were rare single VPCs, and frequent PACs representing 3 2% of total beats  There were several episodes of sinus arrhythmias with sinus bradycardia and heart rate ranging from 40-90 bpm  No sustained dysrhythmias, or pauses noted   The patient did not   • Hypertension    • Liver disease    • Obese    • Sleep apnea    SOC: 3/20/222   FOTO: TBA on post op IE  POC Expiration: "8/17/23  Daily Treatment Log:  Date 6/5/2023 6/8/2023 6/1/23   Visit # 11 12   10   Manual                        Ther Exer  35'    40'   Pulleys 3' 5'    5'   Wall slides  5\"x10       Supine cane flex   2# on cane 5\"x10       Pendulums 1# 20xea    1# 20xea   Bi curl bilat  2#    2# 2x10 bilat   Supination hammer 2x10     W/ hammer 2x10    Std abd  AAROM w/ cane 2x10 Min use of cane 2x10    2x10    Std flexion AAROM w/ cane  1x10  Uni min use of cane 2x10    2x10    Std bilat ER AROM 2x10  2x10    2x10    S/L flexion 2x10    2x10    Sup AROM flex 2x5 2x5    2x5    S/L ER  2x5    2x10    S/L abduction 2x10 2x10    2x5            HEP        Ther Activ                                                        NMReed  5'       Gripper  2x10 25 # yellow    2x10 25# yellow   Ball on wall         Rows tube Blue 2x10  Blue 2x10       Shldr ext tube  Blue 2x10  Blue 2x10               Modalities        CP                          Access Code: [email or web address]  URL: [email or web address]  Date: 05/25/2023  Prepared by: Camila Gan    Exercises  - Isometric Shoulder Flexion at Wall  - 1-2 x daily - 7 x weekly - 1 sets - 10 reps - 5 hold  - Circular Shoulder Pendulum with Table Support  - 1-2 x daily - 7 x weekly - 2 sets - 10 reps  - Seated Bilateral Shoulder Flexion Towel Slide at Table Top  - 1 x daily - 7 x weekly - 2 sets - 10 reps - 5 hold  - Standing Elbow Flexion Extension AROM  - 1 x daily - 7 x weekly - 2 sets - 10 reps  - Seated Forearm Pronation and Supination AROM  - 1 x daily - 7 x weekly - 2 sets - 10 reps  - Supine Shoulder External Rotation with Dowel  - 1 x daily - 7 x weekly - 2 sets - 10 reps - 5 hold  - Standing Shoulder Abduction ROM with Dowel  - 1 x daily - 7 x weekly - 2 sets - 10 reps  - Shoulder Flexion Overhead with Dowel  - 1 x daily - 7 x weekly - 2 sets - 10 reps  - Shoulder External Rotation and Scapular Retraction  - 1 x daily - 7 x weekly - 2 sets - 10 reps            "

## 2023-06-11 ENCOUNTER — APPOINTMENT (EMERGENCY)
Dept: RADIOLOGY | Facility: HOSPITAL | Age: 68
DRG: 496 | End: 2023-06-11
Payer: MEDICARE

## 2023-06-11 ENCOUNTER — HOSPITAL ENCOUNTER (INPATIENT)
Facility: HOSPITAL | Age: 68
LOS: 8 days | Discharge: HOME HEALTH | DRG: 496 | End: 2023-06-19
Attending: EMERGENCY MEDICINE | Admitting: STUDENT IN AN ORGANIZED HEALTH CARE EDUCATION/TRAINING PROGRAM
Payer: MEDICARE

## 2023-06-11 ENCOUNTER — NURSE TRIAGE (OUTPATIENT)
Dept: OTHER | Facility: OTHER | Age: 68
End: 2023-06-11

## 2023-06-11 DIAGNOSIS — M00.9 SEPTIC JOINT (HCC): ICD-10-CM

## 2023-06-11 DIAGNOSIS — T81.49XA POSTOPERATIVE CELLULITIS OF SURGICAL WOUND: ICD-10-CM

## 2023-06-11 DIAGNOSIS — Z96.612 S/P REVERSE TOTAL SHOULDER ARTHROPLASTY, LEFT: ICD-10-CM

## 2023-06-11 DIAGNOSIS — T81.49XA SURGICAL SITE INFECTION: Primary | ICD-10-CM

## 2023-06-11 PROBLEM — Z01.818 PREOP EXAM FOR INTERNAL MEDICINE: Status: ACTIVE | Noted: 2023-06-11

## 2023-06-11 LAB
ANION GAP SERPL CALCULATED.3IONS-SCNC: 2 MMOL/L (ref 4–13)
BUN SERPL-MCNC: 28 MG/DL (ref 5–25)
CALCIUM SERPL-MCNC: 9.7 MG/DL (ref 8.3–10.1)
CHLORIDE SERPL-SCNC: 107 MMOL/L (ref 96–108)
CO2 SERPL-SCNC: 26 MMOL/L (ref 21–32)
CREAT SERPL-MCNC: 1.27 MG/DL (ref 0.6–1.3)
CRP SERPL QL: 27 MG/L
ERYTHROCYTE [DISTWIDTH] IN BLOOD BY AUTOMATED COUNT: 15.2 % (ref 11.6–15.1)
ERYTHROCYTE [SEDIMENTATION RATE] IN BLOOD: 85 MM/HOUR (ref 0–19)
GFR SERPL CREATININE-BSD FRML MDRD: 57 ML/MIN/1.73SQ M
GLUCOSE SERPL-MCNC: 196 MG/DL (ref 65–140)
GLUCOSE SERPL-MCNC: 231 MG/DL (ref 65–140)
HCT VFR BLD AUTO: 39.1 % (ref 36.5–49.3)
HGB BLD-MCNC: 12.7 G/DL (ref 12–17)
MCH RBC QN AUTO: 27.5 PG (ref 26.8–34.3)
MCHC RBC AUTO-ENTMCNC: 32.5 G/DL (ref 31.4–37.4)
MCV RBC AUTO: 85 FL (ref 82–98)
PLATELET # BLD AUTO: 76 THOUSANDS/UL (ref 149–390)
PMV BLD AUTO: 12.4 FL (ref 8.9–12.7)
POTASSIUM SERPL-SCNC: 4 MMOL/L (ref 3.5–5.3)
RBC # BLD AUTO: 4.62 MILLION/UL (ref 3.88–5.62)
SODIUM SERPL-SCNC: 135 MMOL/L (ref 135–147)
WBC # BLD AUTO: 4.79 THOUSAND/UL (ref 4.31–10.16)

## 2023-06-11 PROCEDURE — 85652 RBC SED RATE AUTOMATED: CPT

## 2023-06-11 PROCEDURE — 99223 1ST HOSP IP/OBS HIGH 75: CPT | Performed by: STUDENT IN AN ORGANIZED HEALTH CARE EDUCATION/TRAINING PROGRAM

## 2023-06-11 PROCEDURE — 36415 COLL VENOUS BLD VENIPUNCTURE: CPT

## 2023-06-11 PROCEDURE — 99284 EMERGENCY DEPT VISIT MOD MDM: CPT

## 2023-06-11 PROCEDURE — 87076 CULTURE ANAEROBE IDENT EACH: CPT

## 2023-06-11 PROCEDURE — G1004 CDSM NDSC: HCPCS

## 2023-06-11 PROCEDURE — 82948 REAGENT STRIP/BLOOD GLUCOSE: CPT

## 2023-06-11 PROCEDURE — 80048 BASIC METABOLIC PNL TOTAL CA: CPT

## 2023-06-11 PROCEDURE — 86140 C-REACTIVE PROTEIN: CPT

## 2023-06-11 PROCEDURE — 85027 COMPLETE CBC AUTOMATED: CPT

## 2023-06-11 PROCEDURE — 73030 X-RAY EXAM OF SHOULDER: CPT

## 2023-06-11 PROCEDURE — 87205 SMEAR GRAM STAIN: CPT

## 2023-06-11 PROCEDURE — 87181 SC STD AGAR DILUTION PER AGT: CPT

## 2023-06-11 PROCEDURE — 87070 CULTURE OTHR SPECIMN AEROBIC: CPT

## 2023-06-11 PROCEDURE — 73200 CT UPPER EXTREMITY W/O DYE: CPT

## 2023-06-11 RX ORDER — INSULIN GLARGINE 100 [IU]/ML
30 INJECTION, SOLUTION SUBCUTANEOUS
Status: DISCONTINUED | OUTPATIENT
Start: 2023-06-11 | End: 2023-06-12

## 2023-06-11 RX ORDER — HYDROMORPHONE HCL/PF 1 MG/ML
0.5 SYRINGE (ML) INJECTION EVERY 6 HOURS PRN
Status: DISCONTINUED | OUTPATIENT
Start: 2023-06-11 | End: 2023-06-19

## 2023-06-11 RX ORDER — OXYCODONE HYDROCHLORIDE 5 MG/1
5 TABLET ORAL EVERY 6 HOURS PRN
Status: DISCONTINUED | OUTPATIENT
Start: 2023-06-11 | End: 2023-06-19 | Stop reason: HOSPADM

## 2023-06-11 RX ORDER — OXYCODONE HYDROCHLORIDE 10 MG/1
10 TABLET ORAL EVERY 6 HOURS PRN
Status: DISCONTINUED | OUTPATIENT
Start: 2023-06-11 | End: 2023-06-19 | Stop reason: HOSPADM

## 2023-06-11 RX ORDER — INSULIN LISPRO 100 [IU]/ML
2-12 INJECTION, SOLUTION INTRAVENOUS; SUBCUTANEOUS EVERY 6 HOURS SCHEDULED
Status: DISCONTINUED | OUTPATIENT
Start: 2023-06-11 | End: 2023-06-13

## 2023-06-11 RX ORDER — POLYETHYLENE GLYCOL 3350 17 G/17G
17 POWDER, FOR SOLUTION ORAL DAILY PRN
Status: DISCONTINUED | OUTPATIENT
Start: 2023-06-11 | End: 2023-06-19 | Stop reason: HOSPADM

## 2023-06-11 RX ORDER — SODIUM CHLORIDE 9 MG/ML
100 INJECTION, SOLUTION INTRAVENOUS CONTINUOUS
Status: DISCONTINUED | OUTPATIENT
Start: 2023-06-12 | End: 2023-06-12

## 2023-06-11 RX ORDER — HEPARIN SODIUM 5000 [USP'U]/ML
5000 INJECTION, SOLUTION INTRAVENOUS; SUBCUTANEOUS EVERY 8 HOURS SCHEDULED
Status: DISCONTINUED | OUTPATIENT
Start: 2023-06-12 | End: 2023-06-12

## 2023-06-11 RX ORDER — TRAZODONE HYDROCHLORIDE 50 MG/1
50 TABLET ORAL
Status: DISCONTINUED | OUTPATIENT
Start: 2023-06-11 | End: 2023-06-19 | Stop reason: HOSPADM

## 2023-06-11 RX ORDER — AMOXICILLIN 250 MG
1 CAPSULE ORAL
Status: DISCONTINUED | OUTPATIENT
Start: 2023-06-11 | End: 2023-06-15

## 2023-06-11 RX ORDER — ACETAMINOPHEN 325 MG/1
650 TABLET ORAL EVERY 6 HOURS PRN
Status: DISCONTINUED | OUTPATIENT
Start: 2023-06-11 | End: 2023-06-14

## 2023-06-11 RX ORDER — HEPARIN SODIUM 5000 [USP'U]/ML
5000 INJECTION, SOLUTION INTRAVENOUS; SUBCUTANEOUS EVERY 8 HOURS SCHEDULED
Status: COMPLETED | OUTPATIENT
Start: 2023-06-11 | End: 2023-06-11

## 2023-06-11 RX ORDER — DILTIAZEM HYDROCHLORIDE 180 MG/1
180 CAPSULE, COATED, EXTENDED RELEASE ORAL DAILY
Status: DISCONTINUED | OUTPATIENT
Start: 2023-06-12 | End: 2023-06-19 | Stop reason: HOSPADM

## 2023-06-11 RX ADMIN — INSULIN GLARGINE 30 UNITS: 100 INJECTION, SOLUTION SUBCUTANEOUS at 22:14

## 2023-06-11 RX ADMIN — SODIUM CHLORIDE 3 G: 9 INJECTION, SOLUTION INTRAVENOUS at 19:16

## 2023-06-11 RX ADMIN — TRAZODONE HYDROCHLORIDE 50 MG: 50 TABLET ORAL at 22:14

## 2023-06-11 RX ADMIN — HEPARIN SODIUM 5000 UNITS: 5000 INJECTION INTRAVENOUS; SUBCUTANEOUS at 22:15

## 2023-06-11 RX ADMIN — SODIUM CHLORIDE 500 ML: 0.9 INJECTION, SOLUTION INTRAVENOUS at 19:16

## 2023-06-11 NOTE — TELEPHONE ENCOUNTER
"Regarding: Infected surgery site  ----- Message from Lyssa Galaviz sent at 6/11/2023  7:02 AM EDT -----  \"My surgery site is infected  It is red and hot and has what looks like a boil on it  \"    "

## 2023-06-11 NOTE — TELEPHONE ENCOUNTER
"  Reason for Disposition  • [1] Incision looks infected (spreading redness, pain) AND [2] large red area (> 2 in  or 5 cm)    Answer Assessment - Initial Assessment Questions  1  SYMPTOM: \"What's the main symptom you're concerned about? \" (e g , redness, pain, drainage)       Incision site is hot, red, and ready to break open    2  ONSET: \"When did the symptoms  start? \"      Yesterday    3  SURGERY: \"What surgery was performed? \"      Total reversal of left shoulder    4  DATE of SURGERY: \"When was surgery performed? \"       4/4    5  INCISION SITE: \"Where is the incision located? \"       Left shoulder     6  REDNESS: \"Is there any redness at the incision site? \" If yes, ask: \"How wide across is the redness? \" (Inches, centimeters)       Yes redness    7  PAIN: \"Is there any pain? \" If Yes, ask: \"How bad is it? \"  (Scale 1-10; or mild, moderate, severe)      Moderate    8  BLEEDING: \"Is there any bleeding? \" If Yes, ask: \"How much? \" and \"Where? \"      Denies        10  FEVER: \"Do you have a fever? \" If Yes, ask: \"What is your temperature, how was it measured, and when did it start? \"        Denies    Protocols used: POST-OP INCISION SYMPTOMS AND QUESTIONS-ADULT-      "

## 2023-06-11 NOTE — ASSESSMENT & PLAN NOTE
Shree Cade is seen for pre-operative evaluation  Patient reports no sx of chest pain reported at rest or with exertion, dyspnea at rest or with exertion, PND, LE edema, claudication, or palpitations  No history of ischemic heart disease, CHF, cardiovascular disease, diabetes  No recent anticoagulant or antithrombotic use  Patient reports no history of stress test, cardiac cath, or coronary revascularization)  The patient is able to achieve >4 METs of activity during walking/stairs/work  Pt's RCRI is , correlating with Class II risk, which carries a 6 0 percent 30 day risk of death, MI, or cardiac arrest  Patient's other comorbid conditions include Diabetes, Obesity, HTN, liver cirrhosis  There is no need for further diagnostic testing prior to patient's scheduled procedure  Patient may proceed with surgery with understanding of perioperative risk in light of the benefits of possible surgery

## 2023-06-11 NOTE — ASSESSMENT & PLAN NOTE
"Had total L hip replacement in April 4510 , complicated with surgical wound infection requiring admission , I&D 05/02, with specimen culture results :      Growth in Broth culture only Staphylococcus coagulase negative Abnormal        Growth in Broth culture only Alpha Hemolytic Streptococcus NOT Enterococcus Abnormal             ID evaluated that admission, s/p Cefepime, switched to Vancomycin and was discharged on Duricef and Doxycycline per Dr Idris Barajas Note     06/10 patient noted L shoulder anterior wall /surgical site swelling and erythema , for which he took \"left over\" keflex x 3 tablets he had at home and presented 06/11 to the ED     HD Stable ; denies fever , chills, tachycardia or diaphoresis     PLAN   Ortho consulted and following   S/P Wound aspiration at the ED ; sent for fluid culture per Ortho   Will give 500 cc bolus + IV hydration NS overnight while NPO ; potential plan for OR 06/12 AM  Starting IV Unasyn ; okay per Ortho   Consider ID Consult   Xray and CT L shoulder pending official report   PRN pain medications              "

## 2023-06-11 NOTE — ASSESSMENT & PLAN NOTE
Home regimen include Cardizem 180 mg QD and Losartan 100 mg QD   Hx of ABILIO past admission ; and BP today WNL despite not taking his BP med's ; post IV contrast and plan for OR 06/12   Continue Cardizem ;  Hold Losartan for now and restart 24-h Post Op as appropriate

## 2023-06-11 NOTE — CONSULTS
Orthopedics   Silvia Huynh 69 y o  male MRN: [id number]  Unit/Bed#: CRB      Chief Complaint:   left shoulder pain and swelling    HPI:   67 y o male right hand dominant complaining of left shoulder redness, swelling and pain  He had a reverse total shoulder arthroplasty done on April 4, 2023 by Dr Vonda Aguilera  Last month on 5/2/2023 he underwent I&D of his left shoulder by Dr Vonda Aguilera due to a surgical site infection  He states that he was doing well until yesterday morning when he awoke and noticed a small area of redness around his surgical site that began to swell and spread  He states that the area swelled up but has not drained and is mildly tender  He does not have any pain with shoulder motion and he denies fever and chills at home  pain is dull in character, Located in the left shoulder near his incision, acute in onset, constant in duration, severe in intensity  Exacerbating factors include palpation, remitting factors include rest  nonradiating, no numbness, no tingling, no open wounds noted  No other complaints at this time  PMH significant for diabetes on insulin, hypertension, liver disease  Occupation farmer  Review Of Systems:   · Skin: Red and swollen  · Neuro: See HPI  · Musculoskeletal: See HPI  · 14 point review of systems negative except as stated above     Past Medical History:   Past Medical History:   Diagnosis Date   • Arrhythmia    • CPAP (continuous positive airway pressure) dependence    • Diabetes mellitus ([facility] [address] )    • History of echocardiogram 11/14/2017    showed EF of 50-55 percentWith moderate LVH and left ventricle diastolic dysfunction  Left atrium was moderately enlarged  Trace MR noted  • History of Holter monitoring 11/21/2017    showed baseline rhythm of sinus origin with an average heart it of 61 bpm  The lowest heart rate was 49 and the highest heart rate was 10 8 bpm  There were rare single VPCs, and frequent PACs representing 3 2% of total beats   There were several episodes of sinus arrhythmias with sinus bradycardia and heart rate ranging from 40-90 bpm  No sustained dysrhythmias, or pauses noted   The patient did not   • Hypertension    • Liver disease    • Obese    • Sleep apnea        Past Surgical History:   Past Surgical History:   Procedure Laterality Date   • CATARACT EXTRACTION     • EYE SURGERY Left    • HERNIA REPAIR  [id number]   • KNEE ARTHROPLASTY Right    • ND ARTHROPLASTY GLENOHUMERAL JOINT TOTAL SHOULDER Left 2023    Procedure: ARTHROPLASTY SHOULDER REVERSE;  Surgeon: Matilde Rojas MD;  [facility];  Service: Orthopedics   • SHOULDER SURGERY Right    • WOUND DEBRIDEMENT Left 2023    Procedure: INCISION AND DRAINAGE (I&D) EXTREMITY, vac placement;  Surgeon: Matilde Rojas MD;  [facility];  Service: Orthopedics       Family History:  Family history reviewed and non-contributory  Family History   Problem Relation Age of Onset   • Diabetes Mother    • Supraventricular tachycardia Mother    • COPD Father    • Heart disease Father    • Other Father         Sepsis; related to UTI with complicating cardiac problems   • Heart disease Paternal Grandmother        Social History:  Social History     Socioeconomic History   • Marital status: /Civil Union     Spouse name: None   • Number of children: 2   • Years of education: 16   • Highest education level: Some college, no degree   Occupational History   • None   Tobacco Use   • Smoking status: Former     Packs/day: 0 50     Years: 20 00     Total pack years: 10 00     Types: Cigarettes     Quit date:      Years since quittin 4   • Smokeless tobacco: Never   Vaping Use   • Vaping Use: Never used   Substance and Sexual Activity   • Alcohol use: Not Currently     Comment: quit    • Drug use: Never   • Sexual activity: Not Currently   Other Topics Concern   • None   Social History Narrative    · Most recent tobacco use screenin2018 · Do you currently or have you served in the [employer]:   No      · Live alone or with others:   with others      · High blood pressure: Yes      · Exercise level:   Occasional      · Overweight:   Yes      · Obese: Yes      · Diabetes:   Yes      Social Determinants of Health     Financial Resource Strain: Not on file   Food Insecurity: No Food Insecurity (5/1/2023)    Hunger Vital Sign    • Worried About Running Out of Food in the Last Year: Never true    • Ran Out of Food in the Last Year: Never true   Transportation Needs: No Transportation Needs (5/1/2023)    PRAPARE - Transportation    • Lack of Transportation (Medical): No    • Lack of Transportation (Non-Medical): No   Physical Activity: Not on file   Stress: Not on file   Social Connections: Not on file   Intimate Partner Violence: Not on file   Housing Stability: Low Risk  (5/1/2023)    Housing Stability Vital Sign    • Unable to Pay for Housing in the Last Year: No    • Number of Places Lived in the Last Year: 1    • Unstable Housing in the Last Year: No       Allergies: Allergies   Allergen Reactions   • Sulfa Antibiotics Hives           Labs:  0   Lab Value Date/Time    CRP 52 6 (H) 05/01/2023 1143     (H) 05/01/2023 1143    HCT 39 0 05/05/2023 0902    HCT 38 2 05/04/2023 0718    HCT 39 0 05/03/2023 0914    HGB 12 1 05/05/2023 0902    HGB 12 1 05/04/2023 0718    HGB 12 3 05/03/2023 0914    INR 1 07 05/01/2023 1143    WBC 4 14 (L) 05/05/2023 0902    WBC 5 53 05/04/2023 0718    WBC 5 60 05/03/2023 0914       Meds:  No current facility-administered medications for this encounter      Current Outpatient Medications:   •  diltiazem (CARDIZEM CD) 180 mg 24 hr capsule, , Disp: , Rfl:   •  glimepiride (AMARYL) 4 mg tablet, , Disp: , Rfl:   •  Insulin Glargine Solostar (Lantus SoloStar) 100 UNIT/ML SOPN, Inject 0 4 mL (40 Units total) under the skin daily at bedtime, Disp: , Rfl:   •  Insulin Pen Needle (BD Pen Needle Nayla 2nd Gen) 32G X 4 MM "MISC, For use with insulin pen  Pharmacy may dispense brand covered by insurance , Disp: 100 each, Rfl: 0  •  losartan (COZAAR) 100 MG tablet, , Disp: , Rfl:   •  Multiple Vitamin (multivitamin) capsule, Take 1 capsule by mouth daily, Disp: 21 capsule, Rfl: 0  •  traZODone (DESYREL) 50 mg tablet, bedtime, Disp: , Rfl:     Blood Culture:   Lab Results   Component Value Date    BLOODCX No Growth After 5 Days  09/13/2022    BLOODCX No Growth After 5 Days  09/13/2022       Wound Culture:   Lab Results   Component Value Date    WOUNDCULT 1+ Growth of Klebsiella oxytoca (A) 09/14/2022    WOUNDCULT 1+ Growth of 09/14/2022       Ins and Outs:  No intake/output data recorded  Physical Exam:   /51 (BP Location: Left arm)   Pulse 66   Temp 98 1 °F (36 7 °C) (Oral)   Resp 18   Ht 5' 11\" (1 803 m)   Wt 113 kg (250 lb)   SpO2 98%   BMI 34 87 kg/m²   Gen: No acute distress, resting comfortably in bed  HEENT: Eyes clear, moist mucus membranes, hearing intact  Respiratory: No audible wheezing or stridor  Cardiovascular: Well Perfused peripherally, 2+ distal pulse  Abdomen: nondistended, no peritoneal signs  · Musculoskeletal: left Upper Extremity  · Skin: Erythema over anterior aspect of the left shoulder near the incision  There is a palpable area of fluctuance in this area as well  No signs of drainage or open wound  · NonPainful range of motion of the shoulder to flexion, extension, abduction, adduction  · Sensation intact Axillary, Musculocutaneous, Radial, Ulna and Median  · Motor intact to axillary, musculocutaneous, ulnar, radial, median nerves  · 2+ Radial & Ulnar Pulse  · Musculature is soft and compressible, no pain with passive stretch    Radiology:   I personally reviewed the films  X-rays AP/Lateral views of left shoulder show no acute fracture or dislocation with intact rTSA components      CT scan demonstrating the above with a small fluid collection anteriorly in the soft " tissues  Assessment:  69 y o male with  left reverse total shoulder surgical site infection with abscess  He will benefit from operative intervention  Ortho will discuss surgical planning on rounds tomorrow morning  He is okay to receive IV abx for presumed abscess  Please keep NPO at midnight  No plans for surgery today      Plan:   · Abx per primary team  · Nonweight bearing to left upper extremity  · NPO mn  · Possible OR tomorrow for I&D vs I&D poly exchange vs I&D explant of components and abx spacer  · Analgesics for pain  · Dispo: Ortho will follow    Case was reviewed and discussed with senior resident and attending  Chula Stewart MD  Orthopedic surgery resident   · 06/11/23  ·       Chula Stewart MD

## 2023-06-11 NOTE — ED PROVIDER NOTES
History  Chief Complaint   Patient presents with   • Abscess     Patient presents with abscess to right shoulder on surgical site (shoulder clean out 5/2)  Site red and warm  Denies any drainage  Denies any fevers/chills  64-year-old man with relevant past med history of type 2 diabetes and left shoulder replacement presents with surgical site infection  On April 4, he had a total left shoulder replacement  Last month, he was admitted for IV antibiotics and I&D for surgical site infection  He was doing well until yesterday morning  He awoke and noticed a small area of redness on the scar that has spread  This morning, he awoke with a palpable abscess  The area is tender  No significant pain with movement of the left shoulder  He has not had fever or chills  He took 3 leftover cephalexin since yesterday for concerns of infection  His sugars have been in the 1-200 range  He has not had any drainage of fluid  Prior to Admission Medications   Prescriptions Last Dose Informant Patient Reported? Taking? Insulin Glargine Solostar (Lantus SoloStar) 100 UNIT/ML SOPN  Self No No   Sig: Inject 0 4 mL (40 Units total) under the skin daily at bedtime   Insulin Pen Needle (BD Pen Needle Nayla 2nd Gen) 32G X 4 MM MISC  Self No No   Sig: For use with insulin pen  Pharmacy may dispense brand covered by insurance     Multiple Vitamin (multivitamin) capsule  Self No No   Sig: Take 1 capsule by mouth daily   diltiazem (CARDIZEM CD) 180 mg 24 hr capsule  Self Yes No   glimepiride (AMARYL) 4 mg tablet  Self Yes No   losartan (COZAAR) 100 MG tablet  Self Yes No   traZODone (DESYREL) 50 mg tablet  Self Yes No   Sig: bedtime      Facility-Administered Medications: None       Past Medical History:   Diagnosis Date   • Arrhythmia    • CPAP (continuous positive airway pressure) dependence    • Diabetes mellitus ([facility] [address] )    • History of echocardiogram 11/14/2017    showed EF of 50-55 percentWith moderate LVH and left ventricle diastolic dysfunction  Left atrium was moderately enlarged  Trace MR noted  • History of Holter monitoring 2017    showed baseline rhythm of sinus origin with an average heart it of 61 bpm  The lowest heart rate was 49 and the highest heart rate was 10 8 bpm  There were rare single VPCs, and frequent PACs representing 3 2% of total beats  There were several episodes of sinus arrhythmias with sinus bradycardia and heart rate ranging from 40-90 bpm  No sustained dysrhythmias, or pauses noted  The patient did not   • Hypertension    • Liver disease    • Obese    • Sleep apnea        Past Surgical History:   Procedure Laterality Date   • CATARACT EXTRACTION     • EYE SURGERY Left    • HERNIA REPAIR  [id number]   • KNEE ARTHROPLASTY Right    • UT ARTHROPLASTY GLENOHUMERAL JOINT TOTAL SHOULDER Left 2023    Procedure: ARTHROPLASTY SHOULDER REVERSE;  Surgeon: Uma Kerr MD;  [facility];  Service: Orthopedics   • SHOULDER SURGERY Right    • WOUND DEBRIDEMENT Left 2023    Procedure: INCISION AND DRAINAGE (I&D) EXTREMITY, vac placement;  Surgeon: Uma Kerr MD;  [facility];  Service: Orthopedics       Family History   Problem Relation Age of Onset   • Diabetes Mother    • Supraventricular tachycardia Mother    • COPD Father    • Heart disease Father    • Other Father         Sepsis; related to UTI with complicating cardiac problems   • Heart disease Paternal Grandmother      I have reviewed and agree with the history as documented      E-Cigarette/Vaping   • E-Cigarette Use Never User      E-Cigarette/Vaping Substances   • Nicotine No    • THC No    • CBD No    • Flavoring No    • Other No    • Unknown No      Social History     Tobacco Use   • Smoking status: Former     Packs/day: 0 50     Years: 20 00     Total pack years: 10 00     Types: Cigarettes     Quit date:      Years since quittin 4   • Smokeless tobacco: Never   Vaping Use • Vaping Use: Never used   Substance Use Topics   • Alcohol use: Not Currently     Comment: quit 12/24   • Drug use: Never        Review of Systems   Constitutional: Negative for chills and fever  HENT: Negative for ear pain and sore throat  Eyes: Negative for pain and visual disturbance  Respiratory: Negative for cough and shortness of breath  Cardiovascular: Negative for chest pain and palpitations  Gastrointestinal: Negative for abdominal pain, constipation, diarrhea and vomiting  Genitourinary: Negative for dysuria and hematuria  Musculoskeletal: Negative for arthralgias and back pain  Skin: Positive for rash  Negative for color change  Neurological: Negative for seizures, syncope and light-headedness  Psychiatric/Behavioral: Negative for agitation and confusion  Physical Exam  ED Triage Vitals [06/11/23 0829]   Temperature Pulse Respirations Blood Pressure SpO2   98 1 °F (36 7 °C) 90 20 138/63 97 %      Temp Source Heart Rate Source Patient Position - Orthostatic VS BP Location FiO2 (%)   Oral Monitor Sitting Right arm --      Pain Score       5             Orthostatic Vital Signs  Vitals:    06/11/23 0829 06/11/23 1145 06/11/23 1400   BP: 138/63 106/51 121/61   Pulse: 90 66 70   Patient Position - Orthostatic VS: Sitting Sitting Sitting       Physical Exam  Vitals and nursing note reviewed  Constitutional:       General: He is not in acute distress  Appearance: Normal appearance  HENT:      Head: Normocephalic and atraumatic  Right Ear: External ear normal       Left Ear: External ear normal    Cardiovascular:      Rate and Rhythm: Normal rate and regular rhythm  Pulmonary:      Effort: Pulmonary effort is normal  No respiratory distress  Chest:       Musculoskeletal:         General: Normal range of motion  Cervical back: Normal range of motion and neck supple  Skin:     General: Skin is warm and dry     Neurological:      Mental Status: He is alert and oriented to person, place, and time  Mental status is at baseline  Psychiatric:         Mood and Affect: Mood normal          Behavior: Behavior normal              ED Medications  Medications - No data to display    Diagnostic Studies  Results Reviewed     Procedure Component Value Units Date/Time    Body fluid culture and Gram stain [[id number]]     Lab Status: No result Specimen: Body Fluid from Abscess     C-reactive protein [[id number]]  (Abnormal) Collected: 06/11/23 1438    Lab Status: Final result Specimen: Blood from Arm, Right Updated: 06/11/23 1508     CRP 27 0 mg/L     Sedimentation rate, automated [[id number]]  (Abnormal) Collected: 06/11/23 1438    Lab Status: Final result Specimen: Blood from Arm, Right Updated: 06/11/23 1506     Sed Rate 85 mm/hour     CBC [[id number]]  (Abnormal) Collected: 06/11/23 1438    Lab Status: Final result Specimen: Blood from Arm, Right Updated: 06/11/23 1459     WBC 4 79 Thousand/uL      RBC 4 62 Million/uL      Hemoglobin 12 7 g/dL      Hematocrit 39 1 %      MCV 85 fL      MCH 27 5 pg      MCHC 32 5 g/dL      RDW 15 2 %      Platelets 76 Thousands/uL      MPV 12 4 fL                  XR shoulder 2+ vw left   ED Interpretation by Jax Sousa MD (06/11 1428)   No acute abnormality of shoulder prosthesis  CT upper extremity wo contrast left    (Results Pending)         Procedures  Procedures      ED Course       Identification of Seniors at Risk    Flowsheet Row Most Recent Value   (ISAR) Identification of Seniors at Risk    Before the illness or injury that brought you to the Emergency, did you need someone to help you on a regular basis? 0 Filed at: 06/11/2023 0832   In the last 24 hours, have you needed more help than usual? 0 Filed at: 06/11/2023 1232   Have you been hospitalized for one or more nights during the past 6 months?  1 Filed at: 06/11/2023 5794   In general, do you see well? 0 Filed at: 06/11/2023 8760   In general, do you have serious problems "with your memory? 0 Filed at: 06/11/2023 2362   Do you take more than three different medications every day? 1 Filed at: 06/11/2023 5714   ISAR Score 2 Filed at: 06/11/2023 5293        SBIRT 20yo+    Flowsheet Row Most Recent Value   Initial Alcohol Screen: US AUDIT-C     1  How often do you have a drink containing alcohol? 0 Filed at: 06/11/2023 0832   2  How many drinks containing alcohol do you have on a typical day you are drinking? 0 Filed at: 06/11/2023 0832   3a  Male UNDER 65: How often do you have five or more drinks on one occasion? 0 Filed at: 06/11/2023 0832   3b  FEMALE Any Age, or MALE 65+: How often do you have 4 or more drinks on one occassion? 0 Filed at: 06/11/2023 4568   Audit-C Score 0 Filed at: 06/11/2023 9601   JERRY: How many times in the past year have you    Used an illegal drug or used a prescription medication for non-medical reasons? Never Filed at: 06/11/2023 2752                Medical Decision Making  Presents with 1 day of increasing erythema and fluid-filled collection of his left shoulder surgical scar  Will consult orthopedics for evaluation and planning for presumed surgical site infection  Patient declining pain medication at this time  Ortho tentatively planning for I+D of left shoulder in OR tomorrow  Wound culture to be obtained and then antibiotics will be given  Admit to medicine with ortho consult  Patient in agreement with plan and questions were answered  Portions or all of this note were generated using voice recognition software  Occasional wrong word or \"sound a like\" substitutions may have occurred due to the inherent limitations of voice recognition software  Please interpret any errors within the intended context of the whole sentence or idea  Amount and/or Complexity of Data Reviewed  Radiology: independent interpretation performed  Risk  Decision regarding hospitalization              Disposition  Final diagnoses:   Surgical site infection " Time reflects when diagnosis was documented in both MDM as applicable and the Disposition within this note     Time User Action Codes Description Comment    6/11/2023 11:21 AM Sushila Copeland Add [[id number]] Surgical site infection       ED Disposition     ED Disposition   Admit    Condition   Stable    Date/Time   Kristin Jun 11, 2023  5:07 PM    Comment   Case was discussed with CODIE and the patient's admission status was agreed to be Admission Status: inpatient status to the service of Dr Corrin Aase    None         Patient's Medications   Discharge Prescriptions    No medications on file     No discharge procedures on file  PDMP Review       Value Time User    PDMP Reviewed  Yes 4/4/2023 11:01 AM Brent Calvert PA-C           ED Provider  Attending physically available and evaluated Stephen Kamara I managed the patient along with the ED Attending      Electronically Signed by         Brianna Richardson MD  06/11/23 5057

## 2023-06-11 NOTE — H&P
"[address] [facility]  H&P  Name: Nava Magana 69 y o  male I MRN: [id number]  [facility] I Date of Admission: 6/11/2023   Date of Service: 6/11/2023 I Hospital Day: 0      Assessment/Plan   Preop exam for internal medicine  Assessment & Plan  Nava Magana is seen for pre-operative evaluation  Patient reports no sx of chest pain reported at rest or with exertion, dyspnea at rest or with exertion, PND, LE edema, claudication, or palpitations  No history of ischemic heart disease, CHF, cardiovascular disease, diabetes  No recent anticoagulant or antithrombotic use  Patient reports no history of stress test, cardiac cath, or coronary revascularization)  The patient is able to achieve >4 METs of activity during walking/stairs/work  Pt's RCRI is , correlating with Class II risk, which carries a 6 0 percent 30 day risk of death, MI, or cardiac arrest  Patient's other comorbid conditions include Diabetes, Obesity, HTN, liver cirrhosis  There is no need for further diagnostic testing prior to patient's scheduled procedure  Patient may proceed with surgery with understanding of perioperative risk in light of the benefits of possible surgery         Postoperative cellulitis of surgical wound  Assessment & Plan  Had total L hip replacement in April 6967 , complicated with surgical wound infection requiring admission , I&D 05/02, with specimen culture results :      Growth in Broth culture only Staphylococcus coagulase negative Abnormal        Growth in Broth culture only Alpha Hemolytic Streptococcus NOT Enterococcus Abnormal             ID evaluated that admission, s/p Cefepime, switched to Vancomycin and was discharged on Duricef and Doxycycline per Dr Karin Jones Note     06/10 patient noted L shoulder anterior wall /surgical site swelling and erythema , for which he took \"left over\" keflex x 3 tablets he had at home and presented 06/11 to the ED     HD Stable ; denies fever , chills, " "tachycardia or diaphoresis     PLAN   Ortho consulted and following   S/P Wound aspiration at the ED ; sent for fluid culture per Ortho   Will give 500 cc bolus + IV hydration NS overnight while NPO ; potential plan for OR 06/12 AM  Starting IV Unasyn ; okay per Ortho   Consider ID Consult   Xray and CT L shoulder pending official report   PRN pain medications                Insomnia  Assessment & Plan  Continue POA Trazodone 50 mg HS     MEG (obstructive sleep apnea)  Assessment & Plan  Continue HS CPAP     Essential hypertension  Assessment & Plan  Home regimen include Cardizem 180 mg QD and Losartan 100 mg QD   Hx of ABILIO past admission ; and BP today WNL despite not taking his BP med's ; post IV contrast and plan for OR 06/12   Continue Cardizem ; Hold Losartan for now and restart 24-h Post Op as appropriate     Type 2 diabetes mellitus ([facility] [address] )  Assessment & Plan  Lab Results   Component Value Date    HGBA1C 8 4 (H) 03/20/2023       No results for input(s): \"POCGLU\" in the last 72 hours  Blood Sugar Average: Last 72 hrs:     Home regimen on Glargine 40 units HS + Glimepiride 5 mg BID   NPO midnight 06/12 for OR   PLAN : Lantus 30 u HS + SSI Q6H ; low carb diet when appropriate          VTE Pharmacologic Prophylaxis:   Moderate Risk (Score 3-4) - Pharmacological DVT Prophylaxis Ordered: heparin  Code Status: Level 1 - Full Code   Discussion with family: Patient declined call to   Anticipated Length of Stay: Patient will be admitted on an inpatient basis with an anticipated length of stay of greater than 2 midnights secondary to ortho following ; I&D 06/12 ; IV ABx       Total Time Spent on Date of Encounter in care of patient: 55 minutes This time was spent on one or more of the following: performing physical exam; counseling and coordination of care; obtaining or reviewing history; documenting in the medical record; reviewing/ordering tests, medications or procedures; communicating with other " healthcare professionals and discussing with patient's family/caregivers  Chief Complaint: L shoulder wound swelling and erythema     History of Present Illness:  Natalya Griffith is a 69 y o  male with a PMH of obstructive sleep apnea on CPAP, obesity, type 2 diabetes on insulin, hypertension on losartan and Cardizem, recent left shoulder replacement April for 0855, complicated with surgical site wound infection requiring admission May 1 to May 5, evaluated by ID status post I&D with Ortho on 5/2, with antibiotics as above, presenting to the ED 6/11 as noted swelling and erythema at the surgical wound site since overnight, in the ED seen and evaluated by Ortho, specimen drawn, and sent for culture, patient is stable, discussed with Ortho and ED provider, okay to start antibiotics, as above, explained to the patient, all questions answered appropriately offered to call family for update patient declines, as family aware and were earlier present in the ED, confirmed full code, reviewed home medications, denies any other symptoms or concern to suggest systemic infection, medicine team will continue to follow, manage and updated    Review of Systems:  Review of Systems   - GENERAL: Negative for any nausea, vomiting, fevers, chills, or weight loss  - HEENT: Negative for any head/Neck trauma, pain, double/blurry vision, sinusitis, rhinitis, nose bleeding   - CARDIAC: Negative for any chest pain, palpitation, Dyspnea on exertion, peripheral edema  - PULMONARY: Negative for any SOB, cough, wheezing    - GASTROINTESTINAL: Negative for any abdominal pain, N/V/D/C, blood in stool    - GENITOURINARY: Negative for any dysuria, hematuria, incontinence   - NEUROLOGIC: Negative for any muscle weakness, numbness/tingling, memory changes  - MUSCULOSKELETAL:  negative for any joint pains/swelling, limited ROM     - INTEGUMENTARY: Left anterior shoulder wall with swelling and erythema at the surgical site,  - HEMATOLOGIC: Negative for any abnormal bruising, frequent infections or bleeding  Past Medical and Surgical History:   Past Medical History:   Diagnosis Date   • Arrhythmia    • CPAP (continuous positive airway pressure) dependence    • Diabetes mellitus ([facility] [address] )    • History of echocardiogram 11/14/2017    showed EF of 50-55 percentWith moderate LVH and left ventricle diastolic dysfunction  Left atrium was moderately enlarged  Trace MR noted  • History of Holter monitoring 11/21/2017    showed baseline rhythm of sinus origin with an average heart it of 61 bpm  The lowest heart rate was 49 and the highest heart rate was 10 8 bpm  There were rare single VPCs, and frequent PACs representing 3 2% of total beats  There were several episodes of sinus arrhythmias with sinus bradycardia and heart rate ranging from 40-90 bpm  No sustained dysrhythmias, or pauses noted  The patient did not   • Hypertension    • Liver disease    • Obese    • Sleep apnea        Past Surgical History:   Procedure Laterality Date   • CATARACT EXTRACTION     • EYE SURGERY Left 1997   • HERNIA REPAIR  [id number]   • KNEE ARTHROPLASTY Right 2008   • [place] ARTHROPLASTY GLENOHUMERAL JOINT TOTAL SHOULDER Left 4/4/2023    Procedure: ARTHROPLASTY SHOULDER REVERSE;  Surgeon: Bryanna Lovell MD;  [facility];  Service: Orthopedics   • SHOULDER SURGERY Right 2002   • WOUND DEBRIDEMENT Left 5/2/2023    Procedure: INCISION AND DRAINAGE (I&D) EXTREMITY, vac placement;  Surgeon: Bryanna Lovell MD;  Location: [facility];  Service: Orthopedics       Meds/Allergies:  Prior to Admission medications    Medication Sig Start Date End Date Taking?  Authorizing Provider   diltiazem (CARDIZEM CD) 180 mg 24 hr capsule  3/22/23   Historical Provider, MD   glimepiride (AMARYL) 4 mg tablet  3/22/23   Historical Provider, MD   Insulin Glargine Solostar (Lantus SoloStar) 100 UNIT/ML SOPN Inject 0 4 mL (40 Units total) under the skin daily at bedtime 9/16/22   [place] Jonas,    Insulin Pen Needle (BD Pen Needle Nayla 2nd Gen) 32G X 4 MM MISC For use with insulin pen  Pharmacy may dispense brand covered by insurance  22   Gill Cabezas MD   losartan (COZAAR) 100 MG tablet  3/22/23   Historical Provider, MD   Multiple Vitamin (multivitamin) capsule Take 1 capsule by mouth daily 1/3/21   Clay Sanchez MD   traZODone (DESYREL) 50 mg tablet bedtime 22   Historical Provider, MD   fluticasone-salmeterol (ADVAIR HFA) 115-21 MCG/ACT inhaler Inhale 2 puffs 2 (two) times a day Rinse mouth after use  Patient not taking: Reported on 7/15/2021  5/17/22  Historical Provider, MD Jules Avery 25-5 MG TABS  10/22/20 5/17/22  Historical Provider, MD     I have reviewed home medications with patient personally  Allergies:    Allergies   Allergen Reactions   • Sulfa Antibiotics Hives       Social History:  Marital Status: /Civil Union   Occupation: not on file   Patient Pre-hospital Living Situation: Home  Patient Pre-hospital Level of Mobility: walks  Patient Pre-hospital Diet Restrictions: Low-carb diet  Substance Use History:   Social History     Substance and Sexual Activity   Alcohol Use Not Currently    Comment: quit      Social History     Tobacco Use   Smoking Status Former   • Packs/day: 0 50   • Years:    • Total pack years: 10 00   • Types: Cigarettes   • Quit date:    • Years since quittin 4   Smokeless Tobacco Never     Social History     Substance and Sexual Activity   Drug Use Never       Family History:  Family History   Problem Relation Age of Onset   • Diabetes Mother    • Supraventricular tachycardia Mother    • COPD Father    • Heart disease Father    • Other Father         Sepsis; related to UTI with complicating cardiac problems   • Heart disease Paternal Grandmother        Physical Exam:     Vitals:   Blood Pressure: 132/69 (23 1827)  Pulse: 81 (23 1827)  Temperature: 98 1 °F (36 7 °C) (23 0829)  Temp Source: Oral "(06/11/23 0829)  Respirations: 18 (06/11/23 1400)  Height: 5' 11\" (180 3 cm) (06/11/23 0829)  Weight - Scale: 113 kg (250 lb) (06/11/23 0829)  SpO2: 97 % (06/11/23 1827)    Physical Exam   - GEN: Appears well, alert and oriented x 3, pleasant and cooperative, in no acute distress  - HEENT: Anicteric, mucous membranes moist, PERRL and EOMI   - NECK: No lymphadenopathy, JVD or carotid bruits   - HEART: RRR, normal S1 and S2, no murmurs, clicks, gallops or rubs   - LUNGS: Clear to auscultation bilaterally; no wheezes, rales, or rhonchi  - ABDOMEN: Normal bowel sounds, soft, no tenderness, no distention, no organomegaly or masses felt on exam    - EXTREMITIES: Peripheral pulses normal; no clubbing, cyanosis, or edema  - NEURO: No focal findings, CN II-XII are grossly intact  - Musculoskeletal: 5/5 strength, normal ROM, no swollen or erythematous joints  - SKIN: Mild swelling and erythema surrounding the surgical site concerning for cellulitis  Additional Data:     Lab Results:  Results from last 7 days   Lab Units 06/11/23  1438   HEMATOCRIT % 39 1   HEMOGLOBIN g/dL 12 7   PLATELETS Thousands/uL 76*   WBC Thousand/uL 4 79     Results from last 7 days   Lab Units 06/11/23  1438   ANION GAP mmol/L 2*   BUN mg/dL 28*   CALCIUM mg/dL 9 7   CHLORIDE mmol/L 107   CO2 mmol/L 26   CREATININE mg/dL 1 27   GLUCOSE RANDOM mg/dL 231*   POTASSIUM mmol/L 4 0   SODIUM mmol/L 135                       Lines/Drains:  Invasive Devices     Peripheral Intravenous Line  Duration           Peripheral IV 06/11/23 Proximal;Right;Ventral (anterior) Forearm <1 day                    Imaging: Reviewed radiology reports from this admission including: X-ray and CT left upper extremity pending official read  XR shoulder 2+ vw left   ED Interpretation by Cam De León MD (06/11 1428)   No acute abnormality of shoulder prosthesis        CT upper extremity wo contrast left    (Results Pending)       EKG and Other Studies Reviewed on " Admission:   EKG: No EKG obtained  EKG May 1, 2023 normal sinus rhythm, rate 66 bpm   ** Please Note: This note has been constructed using a voice recognition system   **

## 2023-06-11 NOTE — ASSESSMENT & PLAN NOTE
"Lab Results   Component Value Date    HGBA1C 8 4 (H) 03/20/2023       No results for input(s): \"POCGLU\" in the last 72 hours      Blood Sugar Average: Last 72 hrs:     Home regimen on Glargine 40 units HS + Glimepiride 5 mg BID   NPO midnight 06/12 for OR   PLAN : Lantus 30 u HS + SSI Q6H ; low carb diet when appropriate     "

## 2023-06-12 ENCOUNTER — TELEPHONE (OUTPATIENT)
Dept: PHYSICAL THERAPY | Facility: CLINIC | Age: 68
End: 2023-06-12

## 2023-06-12 ENCOUNTER — APPOINTMENT (OUTPATIENT)
Dept: PHYSICAL THERAPY | Facility: CLINIC | Age: 68
End: 2023-06-12
Payer: MEDICARE

## 2023-06-12 LAB
ALBUMIN SERPL BCP-MCNC: 2.9 G/DL (ref 3.5–5)
ALP SERPL-CCNC: 162 U/L (ref 46–116)
ALT SERPL W P-5'-P-CCNC: 41 U/L (ref 12–78)
ANION GAP SERPL CALCULATED.3IONS-SCNC: 3 MMOL/L (ref 4–13)
APTT PPP: 34 SECONDS (ref 23–37)
AST SERPL W P-5'-P-CCNC: 38 U/L (ref 5–45)
BASOPHILS # BLD AUTO: 0.02 THOUSANDS/ÂΜL (ref 0–0.1)
BASOPHILS NFR BLD AUTO: 1 % (ref 0–1)
BILIRUB SERPL-MCNC: 0.84 MG/DL (ref 0.2–1)
BUN SERPL-MCNC: 25 MG/DL (ref 5–25)
CALCIUM ALBUM COR SERPL-MCNC: 10.1 MG/DL (ref 8.3–10.1)
CALCIUM SERPL-MCNC: 9.2 MG/DL (ref 8.3–10.1)
CHLORIDE SERPL-SCNC: 112 MMOL/L (ref 96–108)
CO2 SERPL-SCNC: 22 MMOL/L (ref 21–32)
CREAT SERPL-MCNC: 1.04 MG/DL (ref 0.6–1.3)
EOSINOPHIL # BLD AUTO: 0.2 THOUSAND/ÂΜL (ref 0–0.61)
EOSINOPHIL NFR BLD AUTO: 6 % (ref 0–6)
ERYTHROCYTE [DISTWIDTH] IN BLOOD BY AUTOMATED COUNT: 15.4 % (ref 11.6–15.1)
GFR SERPL CREATININE-BSD FRML MDRD: 72 ML/MIN/1.73SQ M
GLUCOSE SERPL-MCNC: 164 MG/DL (ref 65–140)
GLUCOSE SERPL-MCNC: 214 MG/DL (ref 65–140)
GLUCOSE SERPL-MCNC: 215 MG/DL (ref 65–140)
GLUCOSE SERPL-MCNC: 230 MG/DL (ref 65–140)
GLUCOSE SERPL-MCNC: 82 MG/DL (ref 65–140)
GLUCOSE SERPL-MCNC: 83 MG/DL (ref 65–140)
HCT VFR BLD AUTO: 35.5 % (ref 36.5–49.3)
HGB BLD-MCNC: 11.2 G/DL (ref 12–17)
IMM GRANULOCYTES # BLD AUTO: 0.01 THOUSAND/UL (ref 0–0.2)
IMM GRANULOCYTES NFR BLD AUTO: 0 % (ref 0–2)
INR PPP: 1.08 (ref 0.84–1.19)
LYMPHOCYTES # BLD AUTO: 0.48 THOUSANDS/ÂΜL (ref 0.6–4.47)
LYMPHOCYTES NFR BLD AUTO: 14 % (ref 14–44)
MCH RBC QN AUTO: 27.5 PG (ref 26.8–34.3)
MCHC RBC AUTO-ENTMCNC: 31.5 G/DL (ref 31.4–37.4)
MCV RBC AUTO: 87 FL (ref 82–98)
MONOCYTES # BLD AUTO: 0.36 THOUSAND/ÂΜL (ref 0.17–1.22)
MONOCYTES NFR BLD AUTO: 10 % (ref 4–12)
NEUTROPHILS # BLD AUTO: 2.43 THOUSANDS/ÂΜL (ref 1.85–7.62)
NEUTS SEG NFR BLD AUTO: 69 % (ref 43–75)
NRBC BLD AUTO-RTO: 0 /100 WBCS
PLATELET # BLD AUTO: 63 THOUSANDS/UL (ref 149–390)
PMV BLD AUTO: 13.6 FL (ref 8.9–12.7)
POTASSIUM SERPL-SCNC: 4 MMOL/L (ref 3.5–5.3)
PROT SERPL-MCNC: 6.7 G/DL (ref 6.4–8.4)
PROTHROMBIN TIME: 14.3 SECONDS (ref 11.6–14.5)
RBC # BLD AUTO: 4.07 MILLION/UL (ref 3.88–5.62)
SODIUM SERPL-SCNC: 137 MMOL/L (ref 135–147)
WBC # BLD AUTO: 3.5 THOUSAND/UL (ref 4.31–10.16)

## 2023-06-12 PROCEDURE — 99232 SBSQ HOSP IP/OBS MODERATE 35: CPT | Performed by: NURSE PRACTITIONER

## 2023-06-12 PROCEDURE — 85730 THROMBOPLASTIN TIME PARTIAL: CPT

## 2023-06-12 PROCEDURE — NC001 PR NO CHARGE: Performed by: ORTHOPAEDIC SURGERY

## 2023-06-12 PROCEDURE — 82948 REAGENT STRIP/BLOOD GLUCOSE: CPT

## 2023-06-12 PROCEDURE — 99223 1ST HOSP IP/OBS HIGH 75: CPT | Performed by: STUDENT IN AN ORGANIZED HEALTH CARE EDUCATION/TRAINING PROGRAM

## 2023-06-12 PROCEDURE — 85610 PROTHROMBIN TIME: CPT

## 2023-06-12 PROCEDURE — 80053 COMPREHEN METABOLIC PANEL: CPT | Performed by: STUDENT IN AN ORGANIZED HEALTH CARE EDUCATION/TRAINING PROGRAM

## 2023-06-12 PROCEDURE — 85025 COMPLETE CBC W/AUTO DIFF WBC: CPT | Performed by: STUDENT IN AN ORGANIZED HEALTH CARE EDUCATION/TRAINING PROGRAM

## 2023-06-12 PROCEDURE — 99223 1ST HOSP IP/OBS HIGH 75: CPT | Performed by: ORTHOPAEDIC SURGERY

## 2023-06-12 RX ORDER — HEPARIN SODIUM 5000 [USP'U]/ML
5000 INJECTION, SOLUTION INTRAVENOUS; SUBCUTANEOUS EVERY 8 HOURS SCHEDULED
Status: COMPLETED | OUTPATIENT
Start: 2023-06-12 | End: 2023-06-12

## 2023-06-12 RX ORDER — VANCOMYCIN HYDROCHLORIDE 1 G/200ML
1000 INJECTION, SOLUTION INTRAVENOUS EVERY 12 HOURS
Status: DISCONTINUED | OUTPATIENT
Start: 2023-06-13 | End: 2023-06-14

## 2023-06-12 RX ORDER — INSULIN GLARGINE 100 [IU]/ML
15 INJECTION, SOLUTION SUBCUTANEOUS
Status: DISCONTINUED | OUTPATIENT
Start: 2023-06-12 | End: 2023-06-14

## 2023-06-12 RX ADMIN — VANCOMYCIN HYDROCHLORIDE 1500 MG: 10 INJECTION, POWDER, LYOPHILIZED, FOR SOLUTION INTRAVENOUS at 16:11

## 2023-06-12 RX ADMIN — HEPARIN SODIUM 5000 UNITS: 5000 INJECTION INTRAVENOUS; SUBCUTANEOUS at 23:42

## 2023-06-12 RX ADMIN — SODIUM CHLORIDE 3 G: 9 INJECTION, SOLUTION INTRAVENOUS at 06:14

## 2023-06-12 RX ADMIN — SODIUM CHLORIDE 3 G: 9 INJECTION, SOLUTION INTRAVENOUS at 00:53

## 2023-06-12 RX ADMIN — CEFTRIAXONE SODIUM 2000 MG: 10 INJECTION, POWDER, FOR SOLUTION INTRAVENOUS at 19:45

## 2023-06-12 RX ADMIN — INSULIN LISPRO 4 UNITS: 100 INJECTION, SOLUTION INTRAVENOUS; SUBCUTANEOUS at 12:33

## 2023-06-12 RX ADMIN — INSULIN GLARGINE 15 UNITS: 100 INJECTION, SOLUTION SUBCUTANEOUS at 23:43

## 2023-06-12 RX ADMIN — SODIUM CHLORIDE 100 ML/HR: 0.9 INJECTION, SOLUTION INTRAVENOUS at 00:53

## 2023-06-12 RX ADMIN — INSULIN LISPRO 4 UNITS: 100 INJECTION, SOLUTION INTRAVENOUS; SUBCUTANEOUS at 00:53

## 2023-06-12 RX ADMIN — POLYETHYLENE GLYCOL 3350 17 G: 17 POWDER, FOR SOLUTION ORAL at 09:23

## 2023-06-12 RX ADMIN — SODIUM CHLORIDE 3 G: 9 INJECTION, SOLUTION INTRAVENOUS at 12:37

## 2023-06-12 RX ADMIN — TRAZODONE HYDROCHLORIDE 50 MG: 50 TABLET ORAL at 23:42

## 2023-06-12 RX ADMIN — INSULIN LISPRO 4 UNITS: 100 INJECTION, SOLUTION INTRAVENOUS; SUBCUTANEOUS at 19:46

## 2023-06-12 NOTE — CONSULTS
Consultation - Infectious Disease   Tana Bal 69 y o  male MRN: [id number]  Unit/Bed#: [facility] 625-01 Encounter: [id number]      IMPRESSION & RECOMMENDATIONS:     1  Left shoulder surgical site infection with abscess, possible prosthetic joint infection  Status post left reverse total shoulder arthroplasty 4/4/2023  The patient presents with recurrent infection and purulent drainage from the left shoulder  CT showed of the shoulder shows a fluid collection likely communicating to the joint space  High concern for a prosthetic joint infection given recurrent abscess at the site, likely with C  Acnes  Cultures from I&D 5/2 grew coagulase-negative staph and alphahemolytic strep in broth only, as well as growth of cutibacterium acnes and avidum  The patient is hemodynamically stable without fevers or systemic signs of infection    -Stop IV Unasyn   -Start IV ceftriaxone 2 g every 24 hours and IV vancomycin, dosing by pharmacy   -Follow-up wound culture   -Plan for OR tomorrow for I&D versus poly exchange/explant of components and antibiotic spacer   -Orthopedic surgery following   -If there is evidence of prosthetic joint infection, anticipate a 6-week course of IV antibiotics followed by 6 weeks of p o  antibiotics   -Monitor white blood cell count, fever curve    2  Type 2 diabetes mellitus, poorly controlled  Hemoglobin A1c 8 4%  This is a risk factor for recurrent infection  -Glycemic management per primary team    3  Obesity  Risk factor for recurrent infection  I have discussed the above management plan in detail with the primary service, the patient and his wife at bedside  ID will follow      I have performed an extensive review of the medical records in Epic including review of the notes, radiographs, and laboratory results     HISTORY OF PRESENT ILLNESS:  Reason for Consult: left shoulder infection  HPI: Tana Bal is a 69 y o  man with a history of diabetes mellitus, left total shoulder arthroplasty 4/4/23 who presented with drainage from the left shoulder  The patient was previously admitted 5/1 to 5/5/23 with a surgical site infection of the left shoulder, status post I&D 5/2/2023 with no deep collection found  The patient was discharged on a 7-day course of Duricef and doxycycline, which was extended another 7 days outpatient due to slow resolution of erythema  Cultures ultimately showed growth of coagulase-negative staph and alphahemolytic strep in broth only, as well as growth of cutibacterium acnes and avidum in anaerobic culture  The patient states that the left shoulder healed and he was doing well with physical therapy  However, the patient presented to the ED on 6/11/2023 with acute onset swelling, erythema of the left shoulder surgical site  He denied any fever, chills at home  CT showed a fluid collection at the anterior aspect of the left shoulder, likely communicating with the glenohumeral joint  Culture of the fluid collection was performed which showed 3+ polys and 1+ gram variable rods on Gram stain  Overnight, the area of swelling over the previously healed incision opened and started to drain significant purulent fluid  The patient is being taken to the OR tomorrow for operative intervention  He is currently receiving IV Unasyn  ID is consulted for further antibiotic management  REVIEW OF SYSTEMS:  A complete review of systems is negative other than that noted in the HPI  PAST MEDICAL HISTORY:  Past Medical History:   Diagnosis Date   • Arrhythmia    • CPAP (continuous positive airway pressure) dependence    • Diabetes mellitus ([facility] [address] )    • History of echocardiogram 11/14/2017    showed EF of 50-55 percentWith moderate LVH and left ventricle diastolic dysfunction  Left atrium was moderately enlarged  Trace MR noted      • History of Holter monitoring 11/21/2017    showed baseline rhythm of sinus origin with an average heart it of 61 bpm  The lowest heart rate was 49 and the highest heart rate was 10 8 bpm  There were rare single VPCs, and frequent PACs representing 3 2% of total beats  There were several episodes of sinus arrhythmias with sinus bradycardia and heart rate ranging from 40-90 bpm  No sustained dysrhythmias, or pauses noted  The patient did not   • Hypertension    • Liver disease    • Obese    • Sleep apnea      Past Surgical History:   Procedure Laterality Date   • CATARACT EXTRACTION     • EYE SURGERY Left    • HERNIA REPAIR  [id number]   • KNEE ARTHROPLASTY Right    • [place] ARTHROPLASTY GLENOHUMERAL JOINT TOTAL SHOULDER Left 2023    Procedure: ARTHROPLASTY SHOULDER REVERSE;  Surgeon: Aleah Aburto MD;  [facility];  Service: Orthopedics   • SHOULDER SURGERY Right    • WOUND DEBRIDEMENT Left 2023    Procedure: INCISION AND DRAINAGE (I&D) EXTREMITY, vac placement;  Surgeon: Aleah Aburto MD;  [facility];  Service: Orthopedics       FAMILY HISTORY:  Non-contributory    SOCIAL HISTORY:  Social History   Social History     Substance and Sexual Activity   Alcohol Use Not Currently    Comment: quit      Social History     Substance and Sexual Activity   Drug Use Never     Social History     Tobacco Use   Smoking Status Former   • Packs/day: 0 50   • Years: 20 00   • Total pack years: 10 00   • Types: Cigarettes   • Quit date:    • Years since quittin 4   Smokeless Tobacco Never       ALLERGIES:  Allergies   Allergen Reactions   • Sulfa Antibiotics Hives       MEDICATIONS:  All current active medications have been reviewed      PHYSICAL EXAM:  Temp:  [98 9 °F (37 2 °C)] 98 9 °F (37 2 °C)  HR:  [70-87] 85  Resp:  [18] 18  BP: (107-132)/(61-69) 108/66  SpO2:  [96 %-98 %] 98 %  Temp (24hrs), Av 9 °F (37 2 °C), Min:98 9 °F (37 2 °C), Max:98 9 °F (37 2 °C)  Current: Temperature: 98 9 °F (37 2 °C)  No intake or output data in the 24 hours ending 23 1355    General Appearance:  Appearing well, nontoxic, and in no distress   Head:  Normocephalic, without obvious abnormality, atraumatic   Eyes:  Conjunctiva pink and sclera anicteric, both eyes   Nose: Nares normal, mucosa normal, no drainage   Throat: Oropharynx moist without lesions   Neck: Supple, symmetrical, no adenopathy, no tenderness/mass/nodules   Back:   Symmetric, no curvature, ROM normal, no CVA tenderness   Lungs:   Clear to auscultation bilaterally, respirations unlabored   Chest Wall:  No tenderness or deformity   Heart:  RRR; no murmur, rub or gallop   Abdomen:   Soft, non-tender, non-distended, positive bowel sounds    Extremities: Left shoulder anterior surgical incision, healed except for opening in middle aspect of the incision with significant purulence expressed  Erythema anterior shoulder   Skin: No rashes or lesions   Lymph nodes: Cervical, supraclavicular nodes normal   Neurologic: Alert and oriented times 3, extremity strength 5/5 and symmetric       LABS, IMAGING, & OTHER STUDIES:  Lab Results:  I have personally reviewed pertinent labs  Results from last 7 days   Lab Units 06/12/23  0459 06/11/23  1438   HEMOGLOBIN g/dL 11 2* 12 7   PLATELETS Thousands/uL 63* 76*   WBC Thousand/uL 3 50* 4 79     Results from last 7 days   Lab Units 06/12/23  0459 06/11/23  1438   ALK PHOS U/L 162*  --    ALT U/L 41  --    AST U/L 38  --    BUN mg/dL 25 28*   CALCIUM mg/dL 9 2 9 7   CHLORIDE mmol/L 112* 107   CO2 mmol/L 22 26   CREATININE mg/dL 1 04 1 27   EGFR ml/min/1 73sq m 72 57   POTASSIUM mmol/L 4 0 4 0   SODIUM mmol/L 137 135     Results from last 7 days   Lab Units 06/11/23  1701   BODY FLUID CULTURE, STERILE  No growth   GRAM STAIN RESULT  3+ Polys*  1+ Gram variable rods*         Results from last 7 days   Lab Units 06/11/23  1438   CRP mg/L 27 0*               Imaging Studies:   I have personally reviewed pertinent imaging study reports and images in PACS      CT left shoulder: fluid collection anterior aspect of left shoulder, likely communicating with the glenohumeral joint

## 2023-06-12 NOTE — PROGRESS NOTES
Dwight Baker is a 69 y o  male who is currently ordered Vancomycin IV with management by the Pharmacy Consult service  Relevant clinical data and objective / subjective history reviewed  Vancomycin Assessment:  Indication and Goal AUC/Trough: Bone/joint infection (goal -600, trough >10); Soft tissue (goal -600, trough >10)  Micro:     Renal Function:  SCr: 1 04 mg/dL  CrCl: 86 mL/min  Renal replacement: Not on dialysis  Days of Therapy: 1  Current Dose: 1500 mg IV loading dose x1  Vancomycin Plan:  New Dosin mg IV q12h  Estimated AUC: 462 mg/L*hr  Estimated Trough: 15 5 mg/L  Next Level: 23 @ 0600  Renal Function Monitoring: Daily BMP and UOP  Pharmacy will continue to follow closely for s/sx of nephrotoxicity, infusion reactions and appropriateness of therapy  BMP and CBC will be ordered per protocol  We will continue to follow the patient’s culture results and clinical progress daily      Eulalio Crook, Pharmacist

## 2023-06-12 NOTE — PLAN OF CARE
Problem: PAIN - ADULT  Goal: Verbalizes/displays adequate comfort level or baseline comfort level  Description: Interventions:  - Encourage patient to monitor pain and request assistance  - Assess pain using appropriate pain scale  - Administer analgesics based on type and severity of pain and evaluate response  - Implement non-pharmacological measures as appropriate and evaluate response  - Consider cultural and social influences on pain and pain management  - Notify physician/advanced practitioner if interventions unsuccessful or patient reports new pain  Outcome: Progressing     Problem: INFECTION - ADULT  Goal: Absence or prevention of progression during hospitalization  Description: INTERVENTIONS:  - Assess and monitor for signs and symptoms of infection  - Monitor lab/diagnostic results  - Monitor all insertion sites, i e  indwelling lines, tubes, and drains  - Monitor endotracheal if appropriate and nasal secretions for changes in amount and color  - [place] appropriate cooling/warming therapies per order  - Administer medications as ordered  - Instruct and encourage patient and family to use good hand hygiene technique  - Identify and instruct in appropriate isolation precautions for identified infection/condition  Outcome: Progressing  Goal: Absence of fever/infection during neutropenic period  Description: INTERVENTIONS:  - Monitor WBC    Outcome: Progressing     Problem: SAFETY ADULT  Goal: Patient will remain free of falls  Description: INTERVENTIONS:  - Educate patient/family on patient safety including physical limitations  - Instruct patient to call for assistance with activity   - Consult OT/PT to assist with strengthening/mobility   - Keep Call bell within reach  - Keep bed low and locked with side rails adjusted as appropriate  - Keep care items and personal belongings within reach  - Initiate and maintain comfort rounds  - Make Fall Risk Sign visible to staff  - Offer Toileting every 2 Hours, in advance of need  - Initiate/Maintain bed alarm  - Obtain necessary fall risk management equipment: alarm  - Apply yellow socks and bracelet for high fall risk patients  - Consider moving patient to room near nurses station  Outcome: Progressing  Goal: Maintain or return to baseline ADL function  Description: INTERVENTIONS:  -  Assess patient's ability to carry out ADLs; assess patient's baseline for ADL function and identify physical deficits which impact ability to perform ADLs (bathing, care of mouth/teeth, toileting, grooming, dressing, etc )  - Assess/evaluate cause of self-care deficits   - Assess range of motion  - Assess patient's mobility; develop plan if impaired  - Assess patient's need for assistive devices and provide as appropriate  - Encourage maximum independence but intervene and supervise when necessary  - Involve family in performance of ADLs  - Assess for home care needs following discharge   - Consider OT consult to assist with ADL evaluation and planning for discharge  - Provide patient education as appropriate  Outcome: Progressing  Goal: Maintains/Returns to pre admission functional level  Description: INTERVENTIONS:  - Perform BMAT or MOVE assessment daily    - Set and communicate daily mobility goal to care team and patient/family/caregiver  - Collaborate with rehabilitation services on mobility goals if consulted  - Perform Range of Motion 3 times a day  - Reposition patient every 2 hours    - Dangle patient 3 times a day  - Stand patient 3 times a day  - Ambulate patient 3 times a day  - Out of bed to chair 3 times a day   - Out of bed for meals 3times a day  - Out of bed for toileting  - Record patient progress and toleration of activity level   Outcome: Progressing     Problem: DISCHARGE PLANNING  Goal: Discharge to home or other facility with appropriate resources  Description: INTERVENTIONS:  - Identify barriers to discharge w/patient and caregiver  - Arrange for needed discharge resources and transportation as appropriate  - Identify discharge learning needs (meds, wound care, etc )  - Arrange for interpretive services to assist at discharge as needed  - Refer to Case Management Department for coordinating discharge planning if the patient needs post-hospital services based on physician/advanced practitioner order or complex needs related to functional status, cognitive ability, or social support system  Outcome: Progressing     Problem: Knowledge Deficit  Goal: Patient/family/caregiver demonstrates understanding of disease process, treatment plan, medications, and discharge instructions  Description: Complete learning assessment and assess knowledge base    Interventions:  - Provide teaching at level of understanding  - Provide teaching via preferred learning methods  Outcome: Progressing

## 2023-06-12 NOTE — ASSESSMENT & PLAN NOTE
BP acceptable, on soft side   Home regimen include Cardizem 180 mg QD and Losartan 100 mg QD   · Can continue Cardizem and hold losartan perioperatively  · Monitor closely

## 2023-06-12 NOTE — PROGRESS NOTES
Progress Note - Orthopedics   Hemant Nichols 69 y o  male MRN: [id number]  Unit/Bed#: [facility] 625-01      Subjective:    69 y o male with left rTSA SSI vs PJI  No acute events, no new complaints  Patient doing well  Pain well controlled  Denies fevers, chills, CP, SOB, N/V, numbness or tingling  Patient reports no issues with urination or bowel movements  Patient states he is feeling slightly better after IV abx and aspiration of his wound      Labs:  0   Lab Value Date/Time    CRP 27 0 (H) 06/11/2023 1438    ESR 85 (H) 06/11/2023 1438    HCT 39 1 06/11/2023 1438    HCT 39 0 05/05/2023 0902    HCT 38 2 05/04/2023 0718    HGB 12 7 06/11/2023 1438    HGB 12 1 05/05/2023 0902    HGB 12 1 05/04/2023 0718    INR 1 07 05/01/2023 1143    WBC 4 79 06/11/2023 1438    WBC 4 14 (L) 05/05/2023 0902    WBC 5 53 05/04/2023 0718       Meds:    Current Facility-Administered Medications:   •  acetaminophen (TYLENOL) tablet 650 mg, 650 mg, Oral, Q6H PRN, Winston Mcneill, DO  •  ampicillin-sulbactam (UNASYN) 3 g in sodium chloride 0 9 % 100 mL IVPB, 3 g, Intravenous, Q6H, Winston Mcneill, DO, Last Rate: 200 mL/hr at 06/11/23 1916, 3 g at 06/11/23 1916  •  [START ON 6/12/2023] diltiazem (CARDIZEM CD) 24 hr capsule 180 mg, 180 mg, Oral, Daily, Winston Mcneill, DO  •  heparin (porcine) subcutaneous injection 5,000 Units, 5,000 Units, Subcutaneous, Q8H [address], Winstonkory Mcneill, DO  •  [START ON 6/12/2023] heparin (porcine) subcutaneous injection 5,000 Units, 5,000 Units, Subcutaneous, Q8H [address], Winston Aiad, DO  •  HYDROmorphone (DILAUDID) injection 0 5 mg, 0 5 mg, Intravenous, Q6H PRN, Winston Mcneill DO  •  insulin glargine (LANTUS) subcutaneous injection 30 Units 0 3 mL, 30 Units, Subcutaneous, HS, Winston Mcneill DO  •  insulin lispro (HumaLOG) 100 units/mL subcutaneous injection 2-12 Units, 2-12 Units, Subcutaneous, Q6H [address] **AND** [START ON 6/12/2023] Fingerstick Glucose (POCT), , , Q6H, Winston Mcneill,   •  oxyCODONE (ROXICODONE) immediate release tablet 10 mg, 10 mg, Oral, Q6H PRN, Winston Mcneill DO  •  oxyCODONE (ROXICODONE) IR tablet 5 mg, 5 mg, Oral, Q6H PRN, Winston Mcneill DO  •  polyethylene glycol (MIRALAX) packet 17 g, 17 g, Oral, Daily PRN, Winston Mcneill DO  •  senna-docusate sodium (SENOKOT S) 8 6-50 mg per tablet 1 tablet, 1 tablet, Oral, HS, Winston Mcneill DO  •  sodium chloride 0 9 % bolus 500 mL, 500 mL, Intravenous, Once, Winston Mcneill DO, Last Rate: 500 mL/hr at 06/11/23 1916, 500 mL at 06/11/23 1916  •  [START ON 6/12/2023] sodium chloride 0 9 % infusion, 100 mL/hr, Intravenous, Continuous, Winston Mcneill DO  •  traZODone (DESYREL) tablet 50 mg, 50 mg, Oral, HS, Winston Mcneill DO    Blood Culture:   Lab Results   Component Value Date    BLOODCX No Growth After 5 Days  09/13/2022    BLOODCX No Growth After 5 Days  09/13/2022       Wound Culture:   Lab Results   Component Value Date    WOUNDCULT 1+ Growth of Klebsiella oxytoca (A) 09/14/2022    WOUNDCULT 1+ Growth of 09/14/2022       Ins and Outs:  No intake/output data recorded  Physical:  Vitals:    06/11/23 1827   BP: 132/69   Pulse: 81   Resp:    Temp:    SpO2: 97%     Musculoskeletal: left Upper Extremity  · Skin erythematous and edematous around incision  · Dressing c/d/i  · TTP around incision site   · Sensation intact to median/radial/ulnar nerve distribution   · Motor intact anterior interosseous nerve/posterior interosseous nerve/median/radial/ulnar nerve distributions  · 2+ radial pulse, symmetric bilaterally  · Digits warm and well perfused  · Capillary refill < 2 seconds      Assessment:    67 y o male with left rTSA SSI vs PJI   Plan for OR on Tuesday with Dr Bettina Aguillon:  · WBAT LUE  · Will monitor for ABLA and administer IVF/prbc as indicated for Greater than 2 gram drop or Hgb < 7  · PT/OT  · Pain control  · DVT ppx per medicine  · Dispo: Ortho will follow    Meg Collins MD

## 2023-06-12 NOTE — ASSESSMENT & PLAN NOTE
Lab Results   Component Value Date    HGBA1C 8 4 (H) 03/20/2023       Recent Labs     06/11/23  1907 06/12/23  0023 06/12/23  0526   POCGLU 196* 230* 82     Home regimen on Glargine 40 units HS + Glimepiride 5 mg BID   · Reduce lantus for now to 15 units QHS and continue SSI  · Diabetic diet  · Monitor accuchecks and adjust regimen PRN

## 2023-06-12 NOTE — PROGRESS NOTES
I spoke with the patient over the phone  I am out of the area but have been in communication with the team caring for the patient including Dr Daniela Curtis  The patient is comfortable with Dr Daniela Curtis assuming his care in my absence and I would defer to his better judgment as to the appropriate surgical options for the patient and again the patient expresses confidence in understanding the clinical scenario and the plan

## 2023-06-12 NOTE — TELEPHONE ENCOUNTER
PT called to follow up with patient as his chart review indicated he had been hospitalized following infection to surgical site  He was unsure when he will return as he may need surgery  Patient cancelled all future appts       Bonilla Sadler PT, DPT   License #  [id number]

## 2023-06-12 NOTE — CASE MANAGEMENT
Case Management Assessment & Discharge Planning Note    Patient name Letitia Castillo  Location [facility] 625/[facility] 674-95 MRN [id number]  : 1955 Date 2023       Current Admission Date: 2023  Current Admission Diagnosis:Type 2 diabetes mellitus [facility])   Patient Active Problem List    Diagnosis Date Noted   • Postoperative cellulitis of surgical wound 2023   • Preop exam for internal medicine 2023   • Ulcer of left foot, limited to breakdown of skin ([address] ) 2023   • Aftercare following left shoulder joint replacement surgery 2023   • S/P reverse total shoulder arthroplasty, left 2023   • Obesity, morbid ([address] ) 2022   • Post-traumatic osteoarthritis of left shoulder 2022   • Body mass index (BMI) of 36 0-36 9 in adult 2022   • Cellulitis 2022   • Diabetic ulcer of left foot associated with type 2 diabetes mellitus ([address] ) 2022   • Leukopenia 2022   • Insomnia 2020   • Elevated troponin 2020   • Chest pressure 2020   • Alcohol abuse 2020   • Type 2 diabetes mellitus ([address] ) 2020   • Essential hypertension 2020   • ABILIO (acute kidney injury) ([facility] ) 2020   • Cholelithiasis    • Alcoholic cirrhosis ([facility] ) 48/10/6619   • MEG (obstructive sleep apnea) 2020   • Anemia 2020   • Thrombocytopenia ([address] ) 2020      LOS (days): 1  Geometric Mean LOS (GMLOS) (days): 3 40  Days to GMLOS:2 7     OBJECTIVE:    Risk of Unplanned Readmission Score: 14 76         Current admission status: Inpatient       Preferred Pharmacy:   [address], [address] [place] [id number]  Phone: [phone] Fax: [phone]    Primary Care Provider: Brittani Holder MD    Primary Insurance: MEDICARE  Secondary Insurance: [employer] Ground    ASSESSMENT:  [address] [place] [address] [place]   Primary Phone: [phone] (Mobile)  Home Phone: [phone] Advance Directives  Does patient have a [address]?: Yes  Does patient have Advance Directives?: Yes  Advance Directives: Living will, Power of  for health care  Primary Contact: Wife, Nathaly Diego    Patient Information  Admitted from[de-identified] Home  Mental Status: Alert  During Assessment patient was accompanied by: Not accompanied during assessment  Assessment information provided by[de-identified] Patient  Primary Caregiver: Self  Support Systems: Spouse/significant other  [place] of Residence: [address] [place] [address] do you live in?: [address]  entry access options   Select all that apply : Stairs  Number of steps to enter home : 1  Type of Current Residence: [place]  In the last 12 months, was there a time when you were not able to pay the mortgage or rent on time?: No  In the last 12 months, how many places have you lived?: 1  In the last 12 months, was there a time when you did not have a steady place to sleep or slept in a shelter (including now)?: No  Homeless/housing insecurity resource given?: N/A  Living Arrangements: Lives w/ Spouse/significant other  Is patient a ?: No    Activities of Daily Living Prior to Admission  Functional Status: Independent  Completes ADLs independently?: Yes  Ambulates independently?: Yes  Does patient use assisted devices?: No  Does patient currently own DME?: No  Does patient have a history of Outpatient Therapy (PT/OT)?: Yes  Does the patient have a history of Short-Term Rehab?: No  Does patient have a history of HHC?: No  Does patient currently have [address]?: No    Patient Information Continued  Income Source: Pension/group home  Does patient have prescription coverage?: Yes  Within the past 12 months, you worried that your food would run out before you got the money to buy more : Never true  Within the past 12 months, the food you bought just didn't last and you didn't have money to get more : Never true  Food insecurity resource given?: N/A  Does patient receive dialysis treatments?: No  Does patient have a history of substance abuse?: No  Does patient have a history of Mental Health Diagnosis?: No    Means of Transportation  Means of Transport to Appts[de-identified] Drives Self  In the past 12 months, has lack of transportation kept you from medical appointments or from getting medications?: No  In the past 12 months, has lack of transportation kept you from meetings, work, or from getting things needed for daily living?: No  Was application for public transport provided?: N/A        DISCHARGE DETAILS:    Discharge planning discussed with[de-identified] Patient  Freedom of Choice: Yes  Comments - Freedom of Choice: Discussed FOC  CM contacted family/caregiver?: No- see comments (Declined)      CM reviewed d/c planning process including the following: identifying help at home, patient preference for d/c planning needs, Discharge Lounge, [facility] Meds to Bed program, availability of treatment team to discuss questions or concerns patient and/or family may have regarding understanding medications and recognizing signs and symptoms once discharged  CM also encouraged patient to follow up with all recommended appointments after discharge  Patient advised of importance for patient and family to participate in managing patient’s medical well being

## 2023-06-12 NOTE — OCCUPATIONAL THERAPY NOTE
Occupational Therapy cx        Patient Name: Letitia JENKINS'Y Date: 6/12/2023 06/12/23 1539   OT Last Visit   OT Visit Date 06/12/23   Note Type   Note type Cancelled Session   Cancel Reasons Patient to operating room   Additional Comments pt planned for revision TSA tomorrow; will cont to follow post-op as appropriate

## 2023-06-12 NOTE — PLAN OF CARE
Problem: PAIN - ADULT  Goal: Verbalizes/displays adequate comfort level or baseline comfort level  Description: Interventions:  - Encourage patient to monitor pain and request assistance  - Assess pain using appropriate pain scale  - Administer analgesics based on type and severity of pain and evaluate response  - Implement non-pharmacological measures as appropriate and evaluate response  - Consider cultural and social influences on pain and pain management  - Notify physician/advanced practitioner if interventions unsuccessful or patient reports new pain  Outcome: Progressing     Problem: INFECTION - ADULT  Goal: Absence or prevention of progression during hospitalization  Description: INTERVENTIONS:  - Assess and monitor for signs and symptoms of infection  - Monitor lab/diagnostic results  - Monitor all insertion sites, i e  indwelling lines, tubes, and drains  - Monitor endotracheal if appropriate and nasal secretions for changes in amount and color  - [place] appropriate cooling/warming therapies per order  - Administer medications as ordered  - Instruct and encourage patient and family to use good hand hygiene technique  - Identify and instruct in appropriate isolation precautions for identified infection/condition  Outcome: Progressing

## 2023-06-12 NOTE — PROGRESS NOTES
[facility]  Progress Note  Name: Jeny Ness  MRN: [id number]  Unit/Bed#: [facility] 371-64 I Date of Admission: 6/11/2023   Date of Service: 6/12/2023 I Hospital Day: 1    Assessment/Plan   * Postoperative cellulitis of surgical wound  Assessment & Plan  Patient here with hx of left shoulder replacement 0/2748 complicated by site wound infection requiring admission from May 1 to May 5 status post I&D with Ortho 5/2  Evaluated by ID at this time  Presented back to the  with noted swelling and erythema of the surgical wound site, aspiration performed, cultures pending  Started on IV Unasyn  · CT LUE without contrast shows Localized fluid collection in the anterior aspect of left shoulder measuring 5 8 x 4 4 x 5 1 cm, which probably communicates with the glenohumeral joint  There are small foci of air within the collection, which may be related to superinfection or sequela of prior I&D procedure  · Ortho following, planning for OR tomorrow with Dr Radha Pastrana, will keep NPO after MN    · ID consult pending  · WBAT LUE  · Pain control  · DVT ppx  · PT/OT          Type 2 diabetes mellitus [facility])  Assessment & Plan  Lab Results   Component Value Date    HGBA1C 8 4 (H) 03/20/2023       Recent Labs     06/11/23  1907 06/12/23  0023 06/12/23  0526   POCGLU 196* 230* 82     Home regimen on Glargine 40 units HS + Glimepiride 5 mg BID   · Reduce lantus for now to 15 units QHS and continue SSI  · Diabetic diet  · Monitor accuchecks and adjust regimen PRN      Essential hypertension  Assessment & Plan  BP acceptable, on soft side   Home regimen include Cardizem 180 mg QD and Losartan 100 mg QD   · Can continue Cardizem and hold losartan perioperatively  · Monitor closely     MEG (obstructive sleep apnea)  Assessment & Plan  · Continue HS CPAP     Insomnia  Assessment & Plan  · Continue home medTrazodone 50 mg HS              VTE Pharmacologic Prophylaxis:   Moderate Risk (Score 3-4) - Pharmacological DVT Prophylaxis Ordered: heparin  Patient Centered Rounds: I performed bedside rounds with nursing staff today  Discussions with Specialists or Other Care Team Provider: nursing, case management, ortho    Education and Discussions with Family / Patient: Patient declined call to   Total Time Spent on Date of Encounter in care of patient: 35 minutes This time was spent on one or more of the following: performing physical exam; counseling and coordination of care; obtaining or reviewing history; documenting in the medical record; reviewing/ordering tests, medications or procedures; communicating with other healthcare professionals and discussing with patient's family/caregivers  Current Length of Stay: 1 day(s)  Current Patient Status: Inpatient   Certification Statement: The patient will continue to require additional inpatient hospital stay due to OR tomorrow, IV abx, ID consult  Discharge Plan: Anticipate discharge in >72 hrs to discharge location to be determined pending rehab evaluations  Code Status: Level 1 - Full Code    Subjective:   Patient denies pain  No fevers or chills  Tolerating abx  States that left shoulder would was draining and bloody drainage last night but nothing further  Agreeable for OR  Objective:     Vitals:   Temp (24hrs), Av 9 °F (37 2 °C), Min:98 9 °F (37 2 °C), Max:98 9 °F (37 2 °C)    Temp:  [98 9 °F (37 2 °C)] 98 9 °F (37 2 °C)  HR:  [66-87] 85  Resp:  [18] 18  BP: (106-132)/(51-69) 108/66  SpO2:  [96 %-98 %] 98 %  Body mass index is 34 87 kg/m²  Input and Output Summary (last 24 hours):   No intake or output data in the 24 hours ending 23 1110    Physical Exam:   Physical Exam  Vitals and nursing note reviewed  Constitutional:       General: He is not in acute distress  Appearance: He is obese  Cardiovascular:      Rate and Rhythm: Normal rate  Pulmonary:      Effort: No respiratory distress        Breath sounds: Normal breath sounds  Abdominal:      Tenderness: There is no abdominal tenderness  Musculoskeletal:         General: Tenderness (left shoulder  Wound with dressing CDI, surrounding erythema noted  ) present  No swelling  Skin:     General: Skin is warm  Neurological:      Mental Status: He is alert and oriented to person, place, and time  Mental status is at baseline     Psychiatric:         Mood and Affect: Mood normal           Additional Data:     Labs:  Results from last 7 days   Lab Units 06/12/23  0459   EOS PCT % 6   HEMATOCRIT % 35 5*   HEMOGLOBIN g/dL 11 2*   LYMPHS PCT % 14   MONOS PCT % 10   NEUTROS PCT % 69   PLATELETS Thousands/uL 63*   WBC Thousand/uL 3 50*     Results from last 7 days   Lab Units 06/12/23  0459   ANION GAP mmol/L 3*   ALBUMIN g/dL 2 9*   ALK PHOS U/L 162*   ALT U/L 41   AST U/L 38   BUN mg/dL 25   CALCIUM mg/dL 9 2   CHLORIDE mmol/L 112*   CO2 mmol/L 22   CREATININE mg/dL 1 04   GLUCOSE RANDOM mg/dL 83   POTASSIUM mmol/L 4 0   SODIUM mmol/L 137   TOTAL BILIRUBIN mg/dL 0 84         Results from last 7 days   Lab Units 06/12/23  0526 06/12/23  0023 06/11/23  1907   POC GLUCOSE mg/dl 82 230* 196*               Lines/Drains:  Invasive Devices     Peripheral Intravenous Line  Duration           Peripheral IV 06/11/23 Proximal;Right;Ventral (anterior) Forearm <1 day                      Imaging: Reviewed radiology reports from this admission including: CT LUE    Recent Cultures (last 7 days):   Results from last 7 days   Lab Units 06/11/23  1701   GRAM STAIN RESULT  3+ Polys*  1+ Gram variable rods*       Last 24 Hours Medication List:   Current Facility-Administered Medications   Medication Dose Route Frequency Provider Last Rate   • acetaminophen  650 mg Oral Q6H PRN Winston Mcneill DO     • ampicillin-sulbactam  3 g Intravenous Q6H Winston Mcneill DO 3 g (06/12/23 2259)   • diltiazem  180 mg Oral Daily Winston Mcneill DO     • heparin (porcine)  5,000 Units Subcutaneous Q8H [id number] [facility],  • HYDROmorphone  0 5 mg Intravenous Q6H PRN Winston Aiad, DO     • insulin glargine  30 Units Subcutaneous HS Winston Aiad, DO     • insulin lispro  2-12 Units Subcutaneous Q6H [address] Winston Aiad, DO     • oxyCODONE  10 mg Oral Q6H PRN Winston Aiad, DO     • oxyCODONE  5 mg Oral Q6H PRN Winston Aiad, DO     • polyethylene glycol  17 g Oral Daily PRN Winston Aiad, DO     • senna-docusate sodium  1 tablet Oral HS Winston Aiad, DO     • traZODone  50 mg Oral HS Winston Aiad, DO          Today, Patient Was Seen By: AMOR Mcfarlane    **Please Note: This note may have been constructed using a voice recognition system  **

## 2023-06-13 ENCOUNTER — ANESTHESIA (INPATIENT)
Dept: PERIOP | Facility: HOSPITAL | Age: 68
DRG: 496 | End: 2023-06-13
Payer: MEDICARE

## 2023-06-13 ENCOUNTER — APPOINTMENT (INPATIENT)
Dept: RADIOLOGY | Facility: HOSPITAL | Age: 68
DRG: 496 | End: 2023-06-13
Payer: MEDICARE

## 2023-06-13 ENCOUNTER — ANESTHESIA EVENT (INPATIENT)
Dept: PERIOP | Facility: HOSPITAL | Age: 68
DRG: 496 | End: 2023-06-13
Payer: MEDICARE

## 2023-06-13 LAB
ABO GROUP BLD: NORMAL
ANION GAP SERPL CALCULATED.3IONS-SCNC: 4 MMOL/L (ref 4–13)
BASOPHILS # BLD AUTO: 0.02 THOUSANDS/ÂΜL (ref 0–0.1)
BASOPHILS # BLD AUTO: 0.05 THOUSANDS/ÂΜL (ref 0–0.1)
BASOPHILS NFR BLD AUTO: 1 % (ref 0–1)
BASOPHILS NFR BLD AUTO: 1 % (ref 0–1)
BLD GP AB SCN SERPL QL: NEGATIVE
BUN SERPL-MCNC: 21 MG/DL (ref 5–25)
CALCIUM SERPL-MCNC: 9.4 MG/DL (ref 8.3–10.1)
CHLORIDE SERPL-SCNC: 110 MMOL/L (ref 96–108)
CO2 SERPL-SCNC: 24 MMOL/L (ref 21–32)
CREAT SERPL-MCNC: 1.01 MG/DL (ref 0.6–1.3)
EOSINOPHIL # BLD AUTO: 0.1 THOUSAND/ÂΜL (ref 0–0.61)
EOSINOPHIL # BLD AUTO: 0.19 THOUSAND/ÂΜL (ref 0–0.61)
EOSINOPHIL NFR BLD AUTO: 2 % (ref 0–6)
EOSINOPHIL NFR BLD AUTO: 7 % (ref 0–6)
ERYTHROCYTE [DISTWIDTH] IN BLOOD BY AUTOMATED COUNT: 15.3 % (ref 11.6–15.1)
ERYTHROCYTE [DISTWIDTH] IN BLOOD BY AUTOMATED COUNT: 15.4 % (ref 11.6–15.1)
GFR SERPL CREATININE-BSD FRML MDRD: 75 ML/MIN/1.73SQ M
GLUCOSE SERPL-MCNC: 106 MG/DL (ref 65–140)
GLUCOSE SERPL-MCNC: 106 MG/DL (ref 65–140)
GLUCOSE SERPL-MCNC: 112 MG/DL (ref 65–140)
GLUCOSE SERPL-MCNC: 114 MG/DL (ref 65–140)
GLUCOSE SERPL-MCNC: 158 MG/DL (ref 65–140)
GLUCOSE SERPL-MCNC: 160 MG/DL (ref 65–140)
HCT VFR BLD AUTO: 38 % (ref 36.5–49.3)
HCT VFR BLD AUTO: 38.3 % (ref 36.5–49.3)
HGB BLD-MCNC: 11.8 G/DL (ref 12–17)
HGB BLD-MCNC: 12.3 G/DL (ref 12–17)
IMM GRANULOCYTES # BLD AUTO: 0.01 THOUSAND/UL (ref 0–0.2)
IMM GRANULOCYTES # BLD AUTO: 0.02 THOUSAND/UL (ref 0–0.2)
IMM GRANULOCYTES NFR BLD AUTO: 0 % (ref 0–2)
IMM GRANULOCYTES NFR BLD AUTO: 0 % (ref 0–2)
LYMPHOCYTES # BLD AUTO: 0.44 THOUSANDS/ÂΜL (ref 0.6–4.47)
LYMPHOCYTES # BLD AUTO: 0.44 THOUSANDS/ÂΜL (ref 0.6–4.47)
LYMPHOCYTES NFR BLD AUTO: 16 % (ref 14–44)
LYMPHOCYTES NFR BLD AUTO: 7 % (ref 14–44)
MCH RBC QN AUTO: 27.1 PG (ref 26.8–34.3)
MCH RBC QN AUTO: 27.5 PG (ref 26.8–34.3)
MCHC RBC AUTO-ENTMCNC: 30.8 G/DL (ref 31.4–37.4)
MCHC RBC AUTO-ENTMCNC: 32.4 G/DL (ref 31.4–37.4)
MCV RBC AUTO: 85 FL (ref 82–98)
MCV RBC AUTO: 88 FL (ref 82–98)
MONOCYTES # BLD AUTO: 0.31 THOUSAND/ÂΜL (ref 0.17–1.22)
MONOCYTES # BLD AUTO: 0.5 THOUSAND/ÂΜL (ref 0.17–1.22)
MONOCYTES NFR BLD AUTO: 11 % (ref 4–12)
MONOCYTES NFR BLD AUTO: 8 % (ref 4–12)
NEUTROPHILS # BLD AUTO: 1.75 THOUSANDS/ÂΜL (ref 1.85–7.62)
NEUTROPHILS # BLD AUTO: 5.57 THOUSANDS/ÂΜL (ref 1.85–7.62)
NEUTS SEG NFR BLD AUTO: 65 % (ref 43–75)
NEUTS SEG NFR BLD AUTO: 82 % (ref 43–75)
NRBC BLD AUTO-RTO: 0 /100 WBCS
NRBC BLD AUTO-RTO: 0 /100 WBCS
PLATELET # BLD AUTO: 67 THOUSANDS/UL (ref 149–390)
PLATELET # BLD AUTO: 90 THOUSANDS/UL (ref 149–390)
PMV BLD AUTO: 13.1 FL (ref 8.9–12.7)
PMV BLD AUTO: 13.2 FL (ref 8.9–12.7)
POTASSIUM SERPL-SCNC: 3.7 MMOL/L (ref 3.5–5.3)
RBC # BLD AUTO: 4.35 MILLION/UL (ref 3.88–5.62)
RBC # BLD AUTO: 4.48 MILLION/UL (ref 3.88–5.62)
RH BLD: POSITIVE
SODIUM SERPL-SCNC: 138 MMOL/L (ref 135–147)
SPECIMEN EXPIRATION DATE: NORMAL
WBC # BLD AUTO: 2.72 THOUSAND/UL (ref 4.31–10.16)
WBC # BLD AUTO: 6.68 THOUSAND/UL (ref 4.31–10.16)

## 2023-06-13 PROCEDURE — 73020 X-RAY EXAM OF SHOULDER: CPT

## 2023-06-13 PROCEDURE — 11981 INSERTION DRUG DLVR IMPLANT: CPT | Performed by: ORTHOPAEDIC SURGERY

## 2023-06-13 PROCEDURE — 87077 CULTURE AEROBIC IDENTIFY: CPT | Performed by: ORTHOPAEDIC SURGERY

## 2023-06-13 PROCEDURE — 99232 SBSQ HOSP IP/OBS MODERATE 35: CPT | Performed by: NURSE PRACTITIONER

## 2023-06-13 PROCEDURE — C9290 INJ, BUPIVACAINE LIPOSOME: HCPCS | Performed by: ANESTHESIOLOGY

## 2023-06-13 PROCEDURE — 86923 COMPATIBILITY TEST ELECTRIC: CPT

## 2023-06-13 PROCEDURE — NC001 PR NO CHARGE: Performed by: ORTHOPAEDIC SURGERY

## 2023-06-13 PROCEDURE — 11400 EXC TR-EXT B9+MARG 0.5 CM<: CPT | Performed by: ORTHOPAEDIC SURGERY

## 2023-06-13 PROCEDURE — 0RHK08Z INSERTION OF SPACER INTO LEFT SHOULDER JOINT, OPEN APPROACH: ICD-10-PCS | Performed by: ORTHOPAEDIC SURGERY

## 2023-06-13 PROCEDURE — 87205 SMEAR GRAM STAIN: CPT | Performed by: ORTHOPAEDIC SURGERY

## 2023-06-13 PROCEDURE — 80048 BASIC METABOLIC PNL TOTAL CA: CPT | Performed by: NURSE PRACTITIONER

## 2023-06-13 PROCEDURE — C1776 JOINT DEVICE (IMPLANTABLE): HCPCS | Performed by: ORTHOPAEDIC SURGERY

## 2023-06-13 PROCEDURE — 87176 TISSUE HOMOGENIZATION CULTR: CPT | Performed by: ORTHOPAEDIC SURGERY

## 2023-06-13 PROCEDURE — 87186 SC STD MICRODIL/AGAR DIL: CPT | Performed by: ORTHOPAEDIC SURGERY

## 2023-06-13 PROCEDURE — 99232 SBSQ HOSP IP/OBS MODERATE 35: CPT | Performed by: STUDENT IN AN ORGANIZED HEALTH CARE EDUCATION/TRAINING PROGRAM

## 2023-06-13 PROCEDURE — 85025 COMPLETE CBC W/AUTO DIFF WBC: CPT

## 2023-06-13 PROCEDURE — C1713 ANCHOR/SCREW BN/BN,TIS/BN: HCPCS | Performed by: ORTHOPAEDIC SURGERY

## 2023-06-13 PROCEDURE — 0RPK0JZ REMOVAL OF SYNTHETIC SUBSTITUTE FROM LEFT SHOULDER JOINT, OPEN APPROACH: ICD-10-PCS | Performed by: ORTHOPAEDIC SURGERY

## 2023-06-13 PROCEDURE — 23335 SHOULDER PROSTHESIS REMOVAL: CPT | Performed by: ORTHOPAEDIC SURGERY

## 2023-06-13 PROCEDURE — 86900 BLOOD TYPING SEROLOGIC ABO: CPT

## 2023-06-13 PROCEDURE — 82948 REAGENT STRIP/BLOOD GLUCOSE: CPT

## 2023-06-13 PROCEDURE — 87076 CULTURE ANAEROBE IDENT EACH: CPT | Performed by: ORTHOPAEDIC SURGERY

## 2023-06-13 PROCEDURE — 87070 CULTURE OTHR SPECIMN AEROBIC: CPT | Performed by: ORTHOPAEDIC SURGERY

## 2023-06-13 PROCEDURE — 85025 COMPLETE CBC W/AUTO DIFF WBC: CPT | Performed by: NURSE PRACTITIONER

## 2023-06-13 PROCEDURE — 87075 CULTR BACTERIA EXCEPT BLOOD: CPT | Performed by: ORTHOPAEDIC SURGERY

## 2023-06-13 PROCEDURE — 86901 BLOOD TYPING SEROLOGIC RH(D): CPT

## 2023-06-13 PROCEDURE — 86850 RBC ANTIBODY SCREEN: CPT

## 2023-06-13 DEVICE — SMARTSET GHV GENTAMICIN HIGH VISCOSITY BONE CEMENT 40G
Type: IMPLANTABLE DEVICE | Site: SHOULDER | Status: FUNCTIONAL
Brand: SMARTSET

## 2023-06-13 DEVICE — IMPLANTABLE DEVICE: Type: IMPLANTABLE DEVICE | Site: SHOULDER | Status: FUNCTIONAL

## 2023-06-13 RX ORDER — CALCIUM CHLORIDE 100 MG/ML
INJECTION INTRAVENOUS; INTRAVENTRICULAR AS NEEDED
Status: DISCONTINUED | OUTPATIENT
Start: 2023-06-13 | End: 2023-06-13

## 2023-06-13 RX ORDER — ONDANSETRON 2 MG/ML
4 INJECTION INTRAMUSCULAR; INTRAVENOUS ONCE AS NEEDED
Status: DISCONTINUED | OUTPATIENT
Start: 2023-06-13 | End: 2023-06-13 | Stop reason: HOSPADM

## 2023-06-13 RX ORDER — LIDOCAINE HYDROCHLORIDE 10 MG/ML
INJECTION, SOLUTION EPIDURAL; INFILTRATION; INTRACAUDAL; PERINEURAL AS NEEDED
Status: DISCONTINUED | OUTPATIENT
Start: 2023-06-13 | End: 2023-06-13

## 2023-06-13 RX ORDER — MAGNESIUM HYDROXIDE 1200 MG/15ML
LIQUID ORAL AS NEEDED
Status: DISCONTINUED | OUTPATIENT
Start: 2023-06-13 | End: 2023-06-13 | Stop reason: HOSPADM

## 2023-06-13 RX ORDER — FENTANYL CITRATE 50 UG/ML
INJECTION, SOLUTION INTRAMUSCULAR; INTRAVENOUS AS NEEDED
Status: DISCONTINUED | OUTPATIENT
Start: 2023-06-13 | End: 2023-06-13

## 2023-06-13 RX ORDER — MIDAZOLAM HYDROCHLORIDE 2 MG/2ML
INJECTION, SOLUTION INTRAMUSCULAR; INTRAVENOUS AS NEEDED
Status: DISCONTINUED | OUTPATIENT
Start: 2023-06-13 | End: 2023-06-13

## 2023-06-13 RX ORDER — ONDANSETRON 2 MG/ML
INJECTION INTRAMUSCULAR; INTRAVENOUS AS NEEDED
Status: DISCONTINUED | OUTPATIENT
Start: 2023-06-13 | End: 2023-06-13

## 2023-06-13 RX ORDER — PROPOFOL 10 MG/ML
INJECTION, EMULSION INTRAVENOUS AS NEEDED
Status: DISCONTINUED | OUTPATIENT
Start: 2023-06-13 | End: 2023-06-13

## 2023-06-13 RX ORDER — FENTANYL CITRATE/PF 50 MCG/ML
25 SYRINGE (ML) INJECTION
Status: DISCONTINUED | OUTPATIENT
Start: 2023-06-13 | End: 2023-06-13 | Stop reason: HOSPADM

## 2023-06-13 RX ORDER — BUPIVACAINE HYDROCHLORIDE 5 MG/ML
INJECTION, SOLUTION EPIDURAL; INTRACAUDAL AS NEEDED
Status: DISCONTINUED | OUTPATIENT
Start: 2023-06-13 | End: 2023-06-13

## 2023-06-13 RX ORDER — INSULIN LISPRO 100 [IU]/ML
2-12 INJECTION, SOLUTION INTRAVENOUS; SUBCUTANEOUS
Status: DISCONTINUED | OUTPATIENT
Start: 2023-06-13 | End: 2023-06-19 | Stop reason: HOSPADM

## 2023-06-13 RX ORDER — TRANEXAMIC ACID 100 MG/ML
INJECTION, SOLUTION INTRAVENOUS AS NEEDED
Status: DISCONTINUED | OUTPATIENT
Start: 2023-06-13 | End: 2023-06-13

## 2023-06-13 RX ORDER — HYDROMORPHONE HYDROCHLORIDE 2 MG/ML
INJECTION, SOLUTION INTRAMUSCULAR; INTRAVENOUS; SUBCUTANEOUS AS NEEDED
Status: DISCONTINUED | OUTPATIENT
Start: 2023-06-13 | End: 2023-06-13

## 2023-06-13 RX ORDER — VANCOMYCIN HYDROCHLORIDE 1 G/20ML
INJECTION, POWDER, LYOPHILIZED, FOR SOLUTION INTRAVENOUS AS NEEDED
Status: DISCONTINUED | OUTPATIENT
Start: 2023-06-13 | End: 2023-06-13 | Stop reason: HOSPADM

## 2023-06-13 RX ORDER — ROCURONIUM BROMIDE 10 MG/ML
INJECTION, SOLUTION INTRAVENOUS AS NEEDED
Status: DISCONTINUED | OUTPATIENT
Start: 2023-06-13 | End: 2023-06-13

## 2023-06-13 RX ORDER — CEFAZOLIN SODIUM 2 G/50ML
2000 SOLUTION INTRAVENOUS
Status: COMPLETED | OUTPATIENT
Start: 2023-06-14 | End: 2023-06-13

## 2023-06-13 RX ORDER — SODIUM CHLORIDE, SODIUM LACTATE, POTASSIUM CHLORIDE, CALCIUM CHLORIDE 600; 310; 30; 20 MG/100ML; MG/100ML; MG/100ML; MG/100ML
INJECTION, SOLUTION INTRAVENOUS CONTINUOUS PRN
Status: DISCONTINUED | OUTPATIENT
Start: 2023-06-13 | End: 2023-06-13

## 2023-06-13 RX ADMIN — ONDANSETRON 4 MG: 2 INJECTION INTRAMUSCULAR; INTRAVENOUS at 18:18

## 2023-06-13 RX ADMIN — PROPOFOL 200 MG: 10 INJECTION, EMULSION INTRAVENOUS at 16:07

## 2023-06-13 RX ADMIN — CEFAZOLIN SODIUM 2000 MG: 2 SOLUTION INTRAVENOUS at 17:07

## 2023-06-13 RX ADMIN — INSULIN LISPRO 2 UNITS: 100 INJECTION, SOLUTION INTRAVENOUS; SUBCUTANEOUS at 21:50

## 2023-06-13 RX ADMIN — INSULIN GLARGINE 15 UNITS: 100 INJECTION, SOLUTION SUBCUTANEOUS at 21:49

## 2023-06-13 RX ADMIN — INSULIN LISPRO 2 UNITS: 100 INJECTION, SOLUTION INTRAVENOUS; SUBCUTANEOUS at 13:45

## 2023-06-13 RX ADMIN — BUPIVACAINE HYDROCHLORIDE 10 ML: 5 INJECTION, SOLUTION EPIDURAL; INTRACAUDAL; PERINEURAL at 15:45

## 2023-06-13 RX ADMIN — ROCURONIUM BROMIDE 20 MG: 10 INJECTION, SOLUTION INTRAVENOUS at 16:59

## 2023-06-13 RX ADMIN — FENTANYL CITRATE 50 MCG: 50 INJECTION, SOLUTION INTRAMUSCULAR; INTRAVENOUS at 16:07

## 2023-06-13 RX ADMIN — PHENYLEPHRINE HYDROCHLORIDE 40 MCG/MIN: 10 INJECTION INTRAVENOUS at 16:40

## 2023-06-13 RX ADMIN — TRAZODONE HYDROCHLORIDE 50 MG: 50 TABLET ORAL at 21:50

## 2023-06-13 RX ADMIN — SENNOSIDES AND DOCUSATE SODIUM 1 TABLET: 50; 8.6 TABLET ORAL at 21:51

## 2023-06-13 RX ADMIN — TRANEXAMIC ACID 1000 MG: 100 INJECTION, SOLUTION INTRAVENOUS at 18:08

## 2023-06-13 RX ADMIN — ROCURONIUM BROMIDE 50 MG: 10 INJECTION, SOLUTION INTRAVENOUS at 16:07

## 2023-06-13 RX ADMIN — CALCIUM CHLORIDE 1 G: 100 INJECTION INTRAVENOUS; INTRAVENTRICULAR at 18:40

## 2023-06-13 RX ADMIN — SODIUM CHLORIDE, SODIUM LACTATE, POTASSIUM CHLORIDE, AND CALCIUM CHLORIDE: .6; .31; .03; .02 INJECTION, SOLUTION INTRAVENOUS at 15:40

## 2023-06-13 RX ADMIN — MIDAZOLAM 2 MG: 1 INJECTION INTRAMUSCULAR; INTRAVENOUS at 15:45

## 2023-06-13 RX ADMIN — BUPIVACAINE 20 ML: 13.3 INJECTION, SUSPENSION, LIPOSOMAL INFILTRATION at 15:45

## 2023-06-13 RX ADMIN — HYDROMORPHONE HYDROCHLORIDE 0.5 MG: 2 INJECTION, SOLUTION INTRAMUSCULAR; INTRAVENOUS; SUBCUTANEOUS at 18:34

## 2023-06-13 RX ADMIN — DILTIAZEM HYDROCHLORIDE 180 MG: 180 CAPSULE, COATED, EXTENDED RELEASE ORAL at 09:00

## 2023-06-13 RX ADMIN — TRANEXAMIC ACID 1000 MG: 100 INJECTION, SOLUTION INTRAVENOUS at 16:36

## 2023-06-13 RX ADMIN — FENTANYL CITRATE 50 MCG: 50 INJECTION, SOLUTION INTRAMUSCULAR; INTRAVENOUS at 15:45

## 2023-06-13 RX ADMIN — LIDOCAINE HYDROCHLORIDE 50 MG: 10 INJECTION, SOLUTION EPIDURAL; INFILTRATION; INTRACAUDAL; PERINEURAL at 16:07

## 2023-06-13 RX ADMIN — SUGAMMADEX 400 MG: 100 INJECTION, SOLUTION INTRAVENOUS at 18:31

## 2023-06-13 RX ADMIN — VANCOMYCIN HYDROCHLORIDE 1000 MG: 1 INJECTION, SOLUTION INTRAVENOUS at 05:27

## 2023-06-13 RX ADMIN — ONDANSETRON 4 MG: 2 INJECTION INTRAMUSCULAR; INTRAVENOUS at 16:07

## 2023-06-13 NOTE — ASSESSMENT & PLAN NOTE
BP acceptable   Home regimen: Cardizem 180 mg QD and Losartan 100 mg QD   · Can continue Cardizem and hold losartan perioperatively  · Monitor closely

## 2023-06-13 NOTE — PROGRESS NOTES
[address] [facility]  Progress Note  Name: Spring Waite  MRN: [id number]  Unit/Bed#: [facility] 107-18 I Date of Admission: 6/11/2023   Date of Service: 6/13/2023 I Hospital Day: 2    Assessment/Plan   * Postoperative cellulitis of surgical wound  Assessment & Plan  Patient here with hx of left shoulder replacement [id number] complicated by site wound infection requiring admission from May 1 to May 5 status post I&D with Ortho 5/2  Evaluated by ID at this time  Presented back to the  with noted swelling and erythema of the surgical wound site, aspiration performed, cultures pending  Started on IV abx  · CT LUE without contrast shows Localized fluid collection in the anterior aspect of left shoulder measuring 5 8 x 4 4 x 5 1 cm, which probably communicates with the glenohumeral joint  There are small foci of air within the collection, which may be related to superinfection or sequela of prior I&D procedure  · Ortho following, planning for OR tomorrow today with Dr Grayson Roy, NPO   · ID following, continue Vanc/Rocephin  · Anticipate need for prolonged course IV abx  · WBAT LUE  · Pain control  · DVT ppx  · PT/OT, CM consult for d/c needs  Type 2 diabetes mellitus [facility])  Assessment & Plan  Lab Results   Component Value Date    HGBA1C 8 4 (H) 03/20/2023       Recent Labs     06/12/23  1132 06/12/23  1803 06/12/23  2341 06/13/23  0530   POCGLU 215* 214* 164* 106     Home regimen on Glargine 40 units HS + Glimepiride 5 mg BID   · Continue lantus for now to 15 units QHS and continue SSI  · Diabetic diet  · Monitor accuchecks and adjust regimen PRN      Essential hypertension  Assessment & Plan  BP acceptable   Home regimen: Cardizem 180 mg QD and Losartan 100 mg QD   · Can continue Cardizem and hold losartan perioperatively  · Monitor closely     MEG (obstructive sleep apnea)  Assessment & Plan  · Continue HS CPAP     Insomnia  Assessment & Plan  · Continue home med Trazodone 50 mg HS          VTE Pharmacologic Prophylaxis:   Moderate Risk (Score 3-4) - Pharmacological DVT Prophylaxis Contraindicated  Sequential Compression Devices Ordered  Patient Centered Rounds: I performed bedside rounds with nursing staff today  Discussions with Specialists or Other Care Team Provider: nursing, case management  Education and Discussions with Family / Patient: Patient declined call to   Total Time Spent on Date of Encounter in care of patient: 35 minutes This time was spent on one or more of the following: performing physical exam; counseling and coordination of care; obtaining or reviewing history; documenting in the medical record; reviewing/ordering tests, medications or procedures; communicating with other healthcare professionals and discussing with patient's family/caregivers  Current Length of Stay: 2 day(s)    Current Patient Status: Inpatient     Certification Statement: The patient will continue to require additional inpatient hospital stay due to for OR today, rest of plan pending post operative course      Discharge Plan: Anticipate discharge in >72 hrs to home with home services  Code Status: Level 1 - Full Code    Subjective:   Denies pain  Reports ongoing purulent drainage from left shoulder wound  Low-grade fever last evening, denies chills  Tolerating antibiotics  Objective:     Vitals:   Temp (24hrs), Av 7 °F (37 1 °C), Min:97 9 °F (36 6 °C), Max:100 1 °F (37 8 °C)    Temp:  [97 9 °F (36 6 °C)-100 1 °F (37 8 °C)] 97 9 °F (36 6 °C)  HR:  [81-94] 94  Resp:  [16-18] 16  BP: (123-132)/(57-70) 123/68  SpO2:  [96 %-100 %] 100 %  Body mass index is 34 87 kg/m²  Input and Output Summary (last 24 hours): Intake/Output Summary (Last 24 hours) at 2023 1056  Last data filed at 2023 0900  Gross per 24 hour   Intake 0 ml   Output --   Net 0 ml       Physical Exam:   Physical Exam  Vitals and nursing note reviewed     Constitutional: General: He is not in acute distress  Appearance: He is obese  Cardiovascular:      Rate and Rhythm: Normal rate  Pulmonary:      Effort: No respiratory distress  Musculoskeletal:         General: No swelling  Skin:     General: Skin is warm  Comments: Left shoulder with dressing, some old serosanguinous drainage noted  Neurological:      Mental Status: He is alert and oriented to person, place, and time  Mental status is at baseline  Psychiatric:         Mood and Affect: Mood normal           Additional Data:     Labs:  Results from last 7 days   Lab Units 06/13/23  0518   EOS PCT % 7*   HEMATOCRIT % 38 0   HEMOGLOBIN g/dL 12 3   LYMPHS PCT % 16   MONOS PCT % 11   NEUTROS PCT % 65   PLATELETS Thousands/uL 67*   WBC Thousand/uL 2 72*     Results from last 7 days   Lab Units 06/13/23  0518 06/12/23  0459   ANION GAP mmol/L 4 3*   ALBUMIN g/dL  --  2 9*   ALK PHOS U/L  --  162*   ALT U/L  --  41   AST U/L  --  38   BUN mg/dL 21 25   CALCIUM mg/dL 9 4 9 2   CHLORIDE mmol/L 110* 112*   CO2 mmol/L 24 22   CREATININE mg/dL 1 01 1 04   GLUCOSE RANDOM mg/dL 106 83   POTASSIUM mmol/L 3 7 4 0   SODIUM mmol/L 138 137   TOTAL BILIRUBIN mg/dL  --  0 84     Results from last 7 days   Lab Units 06/12/23  1703   INR  1 08     Results from last 7 days   Lab Units 06/13/23  0530 06/12/23  2341 06/12/23  1803 06/12/23  1132 06/12/23  0526 06/12/23  0023 06/11/23  1907   POC GLUCOSE mg/dl 106 164* 214* 215* 82 230* 196*               Lines/Drains:  Invasive Devices     Peripheral Intravenous Line  Duration           Peripheral IV 06/11/23 Proximal;Right;Ventral (anterior) Forearm 1 day                      Imaging: No pertinent imaging reviewed      Recent Cultures (last 7 days):   Results from last 7 days   Lab Units 06/11/23  1701   BODY FLUID CULTURE, STERILE  No growth   GRAM STAIN RESULT  3+ Polys*  1+ Gram variable rods*       Last 24 Hours Medication List:   Current Facility-Administered Medications Medication Dose Route Frequency Provider Last Rate   • acetaminophen  650 mg Oral Q6H PRN Winston Aiad, DO     • cefTRIAXone  2,000 mg Intravenous Q24H Ilir Quintanilla MD 2,000 mg (06/12/23 1945)   • diltiazem  180 mg Oral Daily Winston Aiad, DO     • HYDROmorphone  0 5 mg Intravenous Q6H PRN Winston Aiad, DO     • insulin glargine  15 Units Subcutaneous HS AMOR Cobian     • insulin lispro  2-12 Units Subcutaneous Q6H [address] Winston Aiad, DO     • oxyCODONE  10 mg Oral Q6H PRN Winston Aiad, DO     • oxyCODONE  5 mg Oral Q6H PRN Winston Aiad, DO     • polyethylene glycol  17 g Oral Daily PRN Winston Aiad, DO     • senna-docusate sodium  1 tablet Oral HS Winston Aiad, DO     • traZODone  50 mg Oral HS Winston Aiad, DO     • vancomycin  1,000 mg Intravenous Q12H Ilir Quintanilla MD 1,000 mg (06/13/23 0527)        Today, Patient Was Seen By: AMOR Baxter    **Please Note: This note may have been constructed using a voice recognition system  **

## 2023-06-13 NOTE — OP NOTE
OPERATIVE REPORT  PATIENT NAME: Letitia Castillo    :  1955  MRN: [id number]  Pt Location: BE OR ROOM 18    SURGERY DATE: 2023    Surgeon(s) and Role:     * Lita Taylori - Primary     * Sandra Jensen MD - Assisting     * Lieutenant Sheela MD - Assisting    Preop Diagnosis:  Surgical site infection [T81 49XA]  S/P reverse total shoulder arthroplasty, left [Z96 612]    Post-Op Diagnosis Codes:     * Surgical site infection [T81 49XA]     * S/P reverse total shoulder arthroplasty, left [Z96 612]    Procedure(s):  Left - ARTHROPLASTY SHOULDER REVISION    Specimen(s):  ID Type Source Tests Collected by Time Destination   A : superficial Tissue Joint, Left Shoulder ANAEROBIC CULTURE AND GRAM STAIN, CULTURE, TISSUE AND GRAM STAIN [facility] 2023  4:49 PM    B : deep tissue for culture Tissue Joint, Left Shoulder ANAEROBIC CULTURE AND GRAM STAIN, CULTURE, TISSUE AND GRAM STAIN [facility] Radha Searing 2023  4:50 PM    C : celina-implant tissue for culture Tissue Joint, Left Shoulder ANAEROBIC CULTURE AND GRAM STAIN, CULTURE, TISSUE AND GRAM STAIN [facility] Radha Searing 2023  4:50 PM    D : superficial tissue for culture Tissue Joint, Left Shoulder ANAEROBIC CULTURE AND GRAM STAIN, CULTURE, TISSUE AND GRAM STAIN [facility] 2023  4:51 PM    E : humeral celina-implant tissue Tissue Joint, Left Shoulder ANAEROBIC CULTURE AND GRAM STAIN, CULTURE, TISSUE AND GRAM STAIN [facility] Radha Searing 2023  5:07 PM    F : deep swabs Tissue Joint, Left Shoulder ANAEROBIC CULTURE AND GRAM STAIN, CULTURE, TISSUE AND GRAM STAIN [facility] Radha Searing 2023  5:08 PM    G : glenoid celina-implant tissue Tissue Joint, Left Shoulder ANAEROBIC CULTURE AND GRAM STAIN, CULTURE, TISSUE AND GRAM STAIN [facility] 2023  5:12 PM        Estimated Blood Loss:   300 mL    Drains:  Closed/Suction Drain Left Shoulder Accordion 10 Fr   (Active)   Number of days: 0       Anesthesia Type:   General    Operative Indications:  Surgical site infection [T81 49XA]  S/P reverse total shoulder arthroplasty, left [F81 138]      Operative Findings:  Sinus tract left shoulder communicating to the prosthesis  Large abscess left shoulder  Periprosthetic joint infection  Complications:   None    Procedure and Technique:  The patient was seen in preoperative holding area with operative extremities marked  He was taken to the operating room and placed in the supine position  His left upper extremity was prepped and draped in usual sterile fashion  A timeout was called and patient was held on antibiotics administration until cultures were taken intraoperatively  A skin incision was made over the previous surgical scar  Gross purulence was seen coming from the subcutaneous skin  The sinus tract that was seen was ellipsed and excised from the surgical site  Cultures were taken of the sinus tract  Dissection was taken down to the deltopectoral interval   Using Metzenbaum scissors the deltopectoral interval was developed  A Bovie cautery was used to elevate the subscapularis tendon off the lesser tuberosity  The reverse shoulder replacement was then exposed  Jenn-implant tissue was then taken for culture around the humerus  The tissue was necrotic and did look infected  The joint was dislocated using a shoehorn instrument  Using a removal device for the tray the tray of the humerus was removed exposing the glenosphere  The stem was left in tact until exposure of the glenosphere and removal of the glenoid components was performed to help protect from fracture of the humerus  The glenosphere was then removed using a removal instrument  The baseplate was then exposed and showed necrotic infected tissue superficial to it  This was debrided and taken for culture  The peripheral screws were then removed using the screwdriver    The baseplate and central screw was then removed using a removal instrument and showed no difficulty of removal given the gross infection  The remaining bone stock was maintained and no bone was lost with removal of the baseplate  The glenoid was then debrided of any infected tissue and bone down to healthy bleeding tissue and bone  The glenoid was copiously irrigated with 3 L of normal saline  I was pleased with the debridement and vancomycin powder was placed in and around the glenoid vault  Attention was then brought to the humeral implant  Again copious irrigation was performed and celina-implant tissue was debrided off the proximal humerus  The stem was then removed using flexible osteotomes and showed bony ingrowth in the proximal porous coating  The broach handle was placed on the stem and removed with little to no bone loss  The canal of the humerus was normal and showed no signs of gross infection  Copious irrigation was then performed around the humerus using 3 L of normal saline  A trial antibiotic spacer trial was then placed with an [employer] 7 mm stem trial implant  It was acceptable in its size and fit in the canal   The final antibiotic spacer implant was then cemented into the proximal humerus using antibiotic cement  Intraoperative x-rays demonstrated a properly placed antibiotic spacer  Once this was done the shoulder wound was copiously irrigated again with bulb syringes  The deltopectoral interval was closed with #1 PDS suture  A Hemovac drain was placed deep to the deltopectoral interval   The wound was mary irrigated again superficial to the deltopectoral interval   The sinus tract was again debrided making sure to debride any necrotic infected tissue  Vancomycin powder again was placed superficial to the deltopectoral interval   The skin was closed with 2-0 PDS suture  The epidermis was closed with 3-0 nylon  The drain was sewn in with 3-0 nylon  Dressings include Xeroform, 4 x 4 gauze, ABDs, and foam tape  The patient did tolerate the procedure well    There were no complications throughout the case   The patient was placed in PACU in stable condition  I was present for the entire procedure      Patient Disposition:  PACU         SIGNATURE: Lita Taylorastrid  DATE: June 13, 2023  TIME: 6:33 PM

## 2023-06-13 NOTE — PROGRESS NOTES
Progress Note - Infectious Disease   Pino Frankie 69 y o  male MRN: [id number]  Unit/Bed#: [facility] 625-01 Encounter: [id number]      Impression/Plan:  1  Left shoulder surgical site infection with abscess; possible prosthetic infection  · Status post reverse total shoulder arthroplasty done on 4/4/2023; status post incision and drainage of left shoulder on 5/2/2023 presenting with redness, swelling, tenderness associated with incision site  · CT left upper extremity:Localized fluid collection in the anterior aspect of left shoulder measuring 5 8 x 4 4 x 5 1 cm, which probably communicates with the glenohumeral joint  There are small foci of air within the collection, which may be related to superinfection or sequela of prior I&D procedure  · Gram stain from this admission with 3+ polys, 1+ gram variable rods  · Cultures from incision and drainage in 5/2/2023 showing cutibacterium acnes and cutibacterium avidum, also coagulase-negative staph and alphahemolytic strep in broth    · Hemodynamically stable, afebrile except for one temp of 100 1  · Planned for OR by Ortho on 6/13/2023  · Day 2 of Rocephin, day 2 of vancomycin-started 6/12/2023; will likely require prolonged antibiotics 6 weeks IV  + 6 weeks oral    2  DMII; poorly controlled  · A1c of 8 4  · Patient currently n p o  and maintained on Lantus 15 units at bedtime, sliding scale  · Management as per primary     3  Obesity  · Nutritional counseling and weight loss counseling at discharge    Antibiotics:  Day 2 of Rocephin-started 6/12/2023  Day 2 of vancomycin started 6/12/2023    Subjective:  Patient has no fever, chills, sweats; no nausea, vomiting, diarrhea; no cough, shortness of breath  Did report temp of 100 1 overnight  Also reporting rupture of abscess on left shoulder overnight, reports he soaked through multiple gauze pads and could see visible pus  Reports pain relief associated with abscess rupture       Objective:  Vitals:  Temp:  [97 9 °F (36 6 °C)-100 1 °F (37 8 °C)] 97 9 °F (36 6 °C)  HR:  [81-94] 94  Resp:  [16-18] 16  BP: (123-132)/(57-70) 123/68  SpO2:  [96 %-100 %] 100 %  Temp (24hrs), Av 7 °F (37 1 °C), Min:97 9 °F (36 6 °C), Max:100 1 °F (37 8 °C)  Current: Temperature: 97 9 °F (36 6 °C)    Physical Exam:   General Appearance:  Alert, interactive, nontoxic, no acute distress  Throat: Oropharynx moist without lesions  Lungs:   Clear to auscultation bilaterally; no wheezes, rhonchi or rales; respirations unlabored   Heart:  RRR; no murmur, rub or gallop   Abdomen:   Soft, non-tender, non-distended, positive bowel sounds  Extremities: No clubbing, cyanosis or edema  Dressing present over L  shoulder   Skin: No new rashes or lesions  No draining wounds noted  Labs:    All pertinent labs and imaging studies were personally reviewed  Results from last 7 days   Lab Units 23  0518 23  0459 23  1438   HEMOGLOBIN g/dL 12 3 11 2* 12 7   PLATELETS Thousands/uL 67* 63* 76*   WBC Thousand/uL 2 72* 3 50* 4 79     Results from last 7 days   Lab Units 23  0518 23  0459 23  1438   ALK PHOS U/L  --  162*  --    ALT U/L  --  41  --    AST U/L  --  38  --    BUN mg/dL 21 25 28*   CALCIUM mg/dL 9 4 9 2 9 7   CHLORIDE mmol/L 110* 112* 107   CO2 mmol/L 24 22 26   CREATININE mg/dL 1 01 1 04 1 27   EGFR ml/min/1 73sq m 75 72 57   POTASSIUM mmol/L 3 7 4 0 4 0   SODIUM mmol/L 138 137 135         Results from last 7 days   Lab Units 23  1438   CRP mg/L 27 0*               Micro:  Results from last 7 days   Lab Units 23  1701   BODY FLUID CULTURE, STERILE  No growth   GRAM STAIN RESULT  3+ Polys*  1+ Gram variable rods*       Imaging:          Faiza Mcginnis MD  [facility]

## 2023-06-13 NOTE — PROGRESS NOTES
tProgress Note - Jovita Link 69 y o  male MRN: [id number]  Unit/Bed#: [facility] 625-01      Subjective:    69 y o male with left rTSA SSI vs PJI  No acute events, no new complaints  Patient doing well  Pain well controlled  Denies fevers, chills, CP, SOB, N/V, numbness or tingling  Patient reports no issues with urination or bowel movements  Patient states he is ready for surgery      Labs:  0   Lab Value Date/Time    CRP 27 0 (H) 06/11/2023 1438    ESR 85 (H) 06/11/2023 1438    HCT 35 5 (L) 06/12/2023 0459    HCT 39 1 06/11/2023 1438    HCT 39 0 05/05/2023 0902    HGB 11 2 (L) 06/12/2023 0459    HGB 12 7 06/11/2023 1438    HGB 12 1 05/05/2023 0902    INR 1 08 06/12/2023 1703    WBC 3 50 (L) 06/12/2023 0459    WBC 4 79 06/11/2023 1438    WBC 4 14 (L) 05/05/2023 0902       Meds:    Current Facility-Administered Medications:   •  acetaminophen (TYLENOL) tablet 650 mg, 650 mg, Oral, Q6H PRN, Winston Mcneill, DO  •  cefTRIAXone (ROCEPHIN) 2,000 mg in dextrose 5 % 50 mL IVPB, 2,000 mg, Intravenous, Q24H, Nathaly Ji MD, Last Rate: 100 mL/hr at 06/12/23 1945, 2,000 mg at 06/12/23 1945  •  diltiazem (CARDIZEM CD) 24 hr capsule 180 mg, 180 mg, Oral, Daily, Winston Mcneill, DO  •  heparin (porcine) subcutaneous injection 5,000 Units, 5,000 Units, Subcutaneous, Q8H [address], AMOR Cobian  •  HYDROmorphone (DILAUDID) injection 0 5 mg, 0 5 mg, Intravenous, Q6H PRN, Winston Mcneill, DO  •  insulin glargine (LANTUS) subcutaneous injection 15 Units 0 15 mL, 15 Units, Subcutaneous, HS, AMOR Cobian  •  insulin lispro (HumaLOG) 100 units/mL subcutaneous injection 2-12 Units, 2-12 Units, Subcutaneous, Q6H [address], 4 Units at 06/12/23 1946 **AND** Fingerstick Glucose (POCT), , , Q6H, Winston Mcneill, DO  •  oxyCODONE (ROXICODONE) immediate release tablet 10 mg, 10 mg, Oral, Q6H PRN, Winston Mcneill, DO  •  oxyCODONE (ROXICODONE) IR tablet 5 mg, 5 mg, Oral, Q6H PRN, Winston Mcneill, DO  •  polyethylene glycol (MIRALAX) packet 17 g, 17 g, Oral, Daily PRN, Winston Mcneill, DO, 17 g at 06/12/23 3559  •  senna-docusate sodium (SENOKOT S) 8 6-50 mg per tablet 1 tablet, 1 tablet, Oral, HS, Winston Mcneill DO  •  traZODone (DESYREL) tablet 50 mg, 50 mg, Oral, HS, Winstonkory Mcneill, DO, 50 mg at 06/11/23 2214  •  [START ON 6/13/2023] vancomycin (VANCOCIN) IVPB (premix in dextrose) 1,000 mg 200 mL, 1,000 mg, Intravenous, Q12H, Candelario Martinez MD    Blood Culture:   Lab Results   Component Value Date    BLOODCX No Growth After 5 Days  09/13/2022    BLOODCX No Growth After 5 Days  09/13/2022       Wound Culture:   Lab Results   Component Value Date    WOUNDCULT 1+ Growth of Klebsiella oxytoca (A) 09/14/2022    WOUNDCULT 1+ Growth of 09/14/2022       Ins and Outs:  No intake/output data recorded  Physical:  Vitals:    06/12/23 1401   BP: 132/70   Pulse: 93   Resp: 16   Temp: 98 1 °F (36 7 °C)   SpO2: 98%     Musculoskeletal: left Upper Extremity  • Skin erythematous and edematous around incision  • Dressing c/d/i  • TTP around incision site   • Sensation intact to median/radial/ulnar nerve distribution   • Motor intact anterior interosseous nerve/posterior interosseous nerve/median/radial/ulnar nerve distributions  • 2+ radial pulse, symmetric bilaterally  • Digits warm and well perfused  • Capillary refill < 2 seconds      Assessment:    67 y o male with left rTSA SSI vs PJI  Derek Roche  Patient ready for surgery    Plan:  · WBAT LUE  · Continue Abx per primary  · Will monitor for ABLA and administer IVF/prbc as indicated for Greater than 2 gram drop or Hgb < 7  · PT/OT  · Pain control  · DVT ppx per medicine  · Dispo: Ortho will follow    Prabhu Reed MD

## 2023-06-13 NOTE — ANESTHESIA PREPROCEDURE EVALUATION
"Procedure:  ARTHROPLASTY SHOULDER REVISION (Left: Shoulder)    Relevant Problems   CARDIO   (+) Essential hypertension      ENDO   (+) Type 2 diabetes mellitus (HCC)      GI/HEPATIC   (+) Alcoholic cirrhosis (HCC)      /RENAL   (+) ABILIO (acute kidney injury) ([facility] [address] )      HEMATOLOGY   (+) Anemia   (+) Thrombocytopenia (HCC)      MUSCULOSKELETAL   (+) Post-traumatic osteoarthritis of left shoulder      PULMONARY   (+) MEG (obstructive sleep apnea)        \"  History of echocardiogram showed EF of 50-55 percentWith moderate LVH and left ventricle diastolic dysfunction  Left atrium was moderately enlarged  Trace MR noted  History of Holter monitoring showed baseline rhythm of sinus origin with an average heart it of 61 bpm  The lowest heart rate was 49 and the highest heart rate was 10 8 bpm  There were rare single VPCs, and frequent PACs representing 3 2% of total beats  There were several episodes of sinus arrhythmias with sinus bradycardia and heart rate ranging from 40-90 bpm  No sustained dysrhythmias, or pauses noted  The patient did not   \"    Physical Exam    Airway    Mallampati score: III  TM Distance: >3 FB  Neck ROM: full     Dental   No notable dental hx     Cardiovascular  Cardiovascular exam normal    Pulmonary  Pulmonary exam normal     Other Findings        Anesthesia Plan  ASA Score- 3     Anesthesia Type- general with ASA Monitors  Additional Monitors:   Airway Plan: ETT  Comment: + PNB  Plan Factors-    Chart reviewed  EKG reviewed  Existing labs reviewed  Patient summary reviewed  Obstructive sleep apnea risk education given perioperatively  Induction- intravenous  Postoperative Plan- Plan for postoperative opioid use  Planned trial extubation    Informed Consent- Anesthetic plan and risks discussed with patient  I personally reviewed this patient with the CRNA  Discussed and agreed on the Anesthesia Plan with the CRNA  Ryann Bishop             "

## 2023-06-13 NOTE — PROGRESS NOTES
Stephen Kamara is a 69 y o  male who is currently ordered Vancomycin IV with management by the Pharmacy Consult service  Relevant clinical data and objective / subjective history reviewed  Vancomycin Assessment:  Indication and Goal AUC/Trough: Bone/joint infection (goal -600, trough >10); Soft tissue (goal -600, trough >10), -600, trough >10  Clinical Status: stable  Micro:     Renal Function:  SCr: 1 01 mg/dL  CrCl: 87 4 mL/min  Renal replacement: Not on dialysis  Days of Therapy: 2  Current Dose: 1000 mg iv q12h  Vancomycin Plan:  New Dosing: continue current dose  Estimated AUC: 469 mcg*hr/mL  Estimated Trough: 15 6 mcg/mL  Next Level: 6/14/23 6am  Renal Function Monitoring: Daily BMP and UOP  Pharmacy will continue to follow closely for s/sx of nephrotoxicity, infusion reactions and appropriateness of therapy  BMP and CBC will be ordered per protocol  We will continue to follow the patient’s culture results and clinical progress daily      Valentino Lozada, Pharmacist

## 2023-06-13 NOTE — ASSESSMENT & PLAN NOTE
Lab Results   Component Value Date    HGBA1C 8 4 (H) 03/20/2023       Recent Labs     06/12/23  1132 06/12/23  1803 06/12/23  2341 06/13/23  0530   POCGLU 215* 214* 164* 106     Home regimen on Glargine 40 units HS + Glimepiride 5 mg BID   · Continue lantus for now to 15 units QHS and continue SSI  · Diabetic diet  · Monitor accuchecks and adjust regimen PRN

## 2023-06-13 NOTE — ANESTHESIA PROCEDURE NOTES
Peripheral Block    Patient location during procedure: holding area  Start time: 6/13/2023 3:45 PM  Reason for block: at surgeon's request and post-op pain management  Staffing  Performed by: Tuan Arevalo MD  Authorized by: Tuan Arevalo MD    Preanesthetic Checklist  Completed: patient identified, IV checked, site marked, risks and benefits discussed, surgical consent, monitors and equipment checked, pre-op evaluation and timeout performed  Peripheral Block  Patient position: sitting  Prep: ChloraPrep  Patient monitoring: continuous pulse ox and frequent blood pressure checks  Block type: interscalene  Laterality: left  Injection technique: single-shot  Procedures: ultrasound guided, Ultrasound guidance required for the procedure to increase accuracy and safety of medication placement and decrease risk of complications    Needle  Needle localization: anatomical landmarks and ultrasound guidance  Test dose: negative  Assessment  Injection assessment: incremental injection, local visualized surrounding nerve on ultrasound, negative aspiration for heme and no paresthesia on injection  Paresthesia pain: none  Heart rate change: no  Slow fractionated injection: yes  Post-procedure:  site cleaned  patient tolerated the procedure well with no immediate complications

## 2023-06-13 NOTE — PROGRESS NOTES
Progress Note - Orthopedics   Tana Bal 69 y o  male MRN: [id number]      Subjective:  No acute events since surgery  Resting in PACU  Pain controlled       Objective:  Vitals:    06/13/23 1910   BP: 133/56   Pulse: 85   Resp: 18   Temp: (!) 96 5 °F (35 8 °C)   SpO2: 100%     Musculoskeletal: left Upper Extremity  · Dressing C/D/I   · HV in place  · Sensation altered due to block  · Fingers WWP w BCR    Assessment:    69 y o  male post op day #0 from Left shoulder rTSA explantation and insertion of abx spacer    Plan:  · NWB LUE in sling  · Abx as per ID, will need PICC Line  · Follow Cultures  · PT/OT  · Pain control as needed  · APS f/u   · DVT ppx: ambulation, SCDs  · Check labs  · Monitor HV drain  · Case management    Xavier Ayers MD  \

## 2023-06-13 NOTE — ASSESSMENT & PLAN NOTE
Patient here with hx of left shoulder replacement 1/0028 complicated by site wound infection requiring admission from May 1 to May 5 status post I&D with Ortho 5/2  Evaluated by ID at this time  Presented back to the  with noted swelling and erythema of the surgical wound site, aspiration performed, cultures pending  Started on IV abx  · CT LUE without contrast shows Localized fluid collection in the anterior aspect of left shoulder measuring 5 8 x 4 4 x 5 1 cm, which probably communicates with the glenohumeral joint  There are small foci of air within the collection, which may be related to superinfection or sequela of prior I&D procedure  · Ortho following, planning for OR tomorrow today with Dr Adeola Landeros, NPO   · ID following, continue Vanc/Rocephin  · Anticipate need for prolonged course IV abx  · WBAT LUE  · Pain control  · DVT ppx  · PT/OT, CM consult for d/c needs

## 2023-06-13 NOTE — ANESTHESIA POSTPROCEDURE EVALUATION
Post-Op Assessment Note    CV Status:  Stable  Pain Score: 0    Pain management: adequate     Mental Status:  Awake   Hydration Status:  Stable   PONV Controlled:  None   Airway Patency:  Patent      Post Op Vitals Reviewed: Yes      Staff: CRNA         No notable events documented      /56 (06/13/23 1910)    Temp (!) 96 5 °F (35 8 °C) (06/13/23 1910)    Pulse 85 (06/13/23 1910)   Resp 18 (06/13/23 1910)    SpO2 100 % (06/13/23 1910)

## 2023-06-14 LAB
ABO GROUP BLD BPU: NORMAL
ANION GAP SERPL CALCULATED.3IONS-SCNC: 6 MMOL/L (ref 4–13)
BASOPHILS # BLD AUTO: 0.04 THOUSANDS/ÂΜL (ref 0–0.1)
BASOPHILS NFR BLD AUTO: 1 % (ref 0–1)
BPU ID: NORMAL
BUN SERPL-MCNC: 26 MG/DL (ref 5–25)
CALCIUM SERPL-MCNC: 9.3 MG/DL (ref 8.3–10.1)
CHLORIDE SERPL-SCNC: 105 MMOL/L (ref 96–108)
CO2 SERPL-SCNC: 23 MMOL/L (ref 21–32)
CREAT SERPL-MCNC: 1.27 MG/DL (ref 0.6–1.3)
EOSINOPHIL # BLD AUTO: 0.26 THOUSAND/ÂΜL (ref 0–0.61)
EOSINOPHIL NFR BLD AUTO: 5 % (ref 0–6)
ERYTHROCYTE [DISTWIDTH] IN BLOOD BY AUTOMATED COUNT: 15.5 % (ref 11.6–15.1)
GFR SERPL CREATININE-BSD FRML MDRD: 57 ML/MIN/1.73SQ M
GLUCOSE SERPL-MCNC: 228 MG/DL (ref 65–140)
GLUCOSE SERPL-MCNC: 229 MG/DL (ref 65–140)
GLUCOSE SERPL-MCNC: 282 MG/DL (ref 65–140)
GLUCOSE SERPL-MCNC: 289 MG/DL (ref 65–140)
GLUCOSE SERPL-MCNC: 293 MG/DL (ref 65–140)
HCT VFR BLD AUTO: 37 % (ref 36.5–49.3)
HGB BLD-MCNC: 12 G/DL (ref 12–17)
IMM GRANULOCYTES # BLD AUTO: 0.02 THOUSAND/UL (ref 0–0.2)
IMM GRANULOCYTES NFR BLD AUTO: 0 % (ref 0–2)
LYMPHOCYTES # BLD AUTO: 0.69 THOUSANDS/ÂΜL (ref 0.6–4.47)
LYMPHOCYTES NFR BLD AUTO: 14 % (ref 14–44)
MCH RBC QN AUTO: 27.9 PG (ref 26.8–34.3)
MCHC RBC AUTO-ENTMCNC: 32.4 G/DL (ref 31.4–37.4)
MCV RBC AUTO: 86 FL (ref 82–98)
MONOCYTES # BLD AUTO: 0.58 THOUSAND/ÂΜL (ref 0.17–1.22)
MONOCYTES NFR BLD AUTO: 12 % (ref 4–12)
NEUTROPHILS # BLD AUTO: 3.45 THOUSANDS/ÂΜL (ref 1.85–7.62)
NEUTS SEG NFR BLD AUTO: 68 % (ref 43–75)
NRBC BLD AUTO-RTO: 0 /100 WBCS
PLATELET # BLD AUTO: 109 THOUSANDS/UL (ref 149–390)
PMV BLD AUTO: 12.9 FL (ref 8.9–12.7)
POTASSIUM SERPL-SCNC: 4.3 MMOL/L (ref 3.5–5.3)
RBC # BLD AUTO: 4.3 MILLION/UL (ref 3.88–5.62)
SODIUM SERPL-SCNC: 134 MMOL/L (ref 135–147)
UNIT DISPENSE STATUS: NORMAL
UNIT PRODUCT CODE: NORMAL
UNIT PRODUCT VOLUME: 300 ML
UNIT RH: NORMAL
VANCOMYCIN SERPL-MCNC: 29.2 UG/ML (ref 10–20)
WBC # BLD AUTO: 5.04 THOUSAND/UL (ref 4.31–10.16)

## 2023-06-14 PROCEDURE — 36569 INSJ PICC 5 YR+ W/O IMAGING: CPT

## 2023-06-14 PROCEDURE — 99233 SBSQ HOSP IP/OBS HIGH 50: CPT

## 2023-06-14 PROCEDURE — NC001 PR NO CHARGE: Performed by: ORTHOPAEDIC SURGERY

## 2023-06-14 PROCEDURE — 97162 PT EVAL MOD COMPLEX 30 MIN: CPT

## 2023-06-14 PROCEDURE — 99232 SBSQ HOSP IP/OBS MODERATE 35: CPT | Performed by: STUDENT IN AN ORGANIZED HEALTH CARE EDUCATION/TRAINING PROGRAM

## 2023-06-14 PROCEDURE — C1751 CATH, INF, PER/CENT/MIDLINE: HCPCS

## 2023-06-14 PROCEDURE — 80202 ASSAY OF VANCOMYCIN: CPT

## 2023-06-14 PROCEDURE — 85025 COMPLETE CBC W/AUTO DIFF WBC: CPT

## 2023-06-14 PROCEDURE — 80048 BASIC METABOLIC PNL TOTAL CA: CPT

## 2023-06-14 PROCEDURE — 99232 SBSQ HOSP IP/OBS MODERATE 35: CPT | Performed by: NURSE PRACTITIONER

## 2023-06-14 PROCEDURE — 97166 OT EVAL MOD COMPLEX 45 MIN: CPT

## 2023-06-14 PROCEDURE — 82948 REAGENT STRIP/BLOOD GLUCOSE: CPT

## 2023-06-14 PROCEDURE — NC001 PR NO CHARGE

## 2023-06-14 RX ORDER — ENOXAPARIN SODIUM 100 MG/ML
40 INJECTION SUBCUTANEOUS
Status: DISCONTINUED | OUTPATIENT
Start: 2023-06-14 | End: 2023-06-19 | Stop reason: HOSPADM

## 2023-06-14 RX ORDER — INSULIN GLARGINE 100 [IU]/ML
20 INJECTION, SOLUTION SUBCUTANEOUS
Status: DISCONTINUED | OUTPATIENT
Start: 2023-06-14 | End: 2023-06-16

## 2023-06-14 RX ORDER — ACETAMINOPHEN 325 MG/1
650 TABLET ORAL EVERY 8 HOURS SCHEDULED
Status: DISCONTINUED | OUTPATIENT
Start: 2023-06-14 | End: 2023-06-19 | Stop reason: HOSPADM

## 2023-06-14 RX ADMIN — OXYCODONE HYDROCHLORIDE 10 MG: 10 TABLET ORAL at 08:02

## 2023-06-14 RX ADMIN — DILTIAZEM HYDROCHLORIDE 180 MG: 180 CAPSULE, COATED, EXTENDED RELEASE ORAL at 08:01

## 2023-06-14 RX ADMIN — INSULIN GLARGINE 20 UNITS: 100 INJECTION, SOLUTION SUBCUTANEOUS at 22:22

## 2023-06-14 RX ADMIN — SENNOSIDES AND DOCUSATE SODIUM 1 TABLET: 50; 8.6 TABLET ORAL at 22:22

## 2023-06-14 RX ADMIN — HYDROMORPHONE HYDROCHLORIDE 0.5 MG: 1 INJECTION, SOLUTION INTRAMUSCULAR; INTRAVENOUS; SUBCUTANEOUS at 03:53

## 2023-06-14 RX ADMIN — OXYCODONE HYDROCHLORIDE 10 MG: 10 TABLET ORAL at 00:14

## 2023-06-14 RX ADMIN — TRAZODONE HYDROCHLORIDE 50 MG: 50 TABLET ORAL at 22:22

## 2023-06-14 RX ADMIN — ACETAMINOPHEN 650 MG: 325 TABLET, FILM COATED ORAL at 12:37

## 2023-06-14 RX ADMIN — INSULIN LISPRO 6 UNITS: 100 INJECTION, SOLUTION INTRAVENOUS; SUBCUTANEOUS at 18:13

## 2023-06-14 RX ADMIN — INSULIN LISPRO 6 UNITS: 100 INJECTION, SOLUTION INTRAVENOUS; SUBCUTANEOUS at 22:22

## 2023-06-14 RX ADMIN — CEFTRIAXONE SODIUM 2000 MG: 10 INJECTION, POWDER, FOR SOLUTION INTRAVENOUS at 18:10

## 2023-06-14 RX ADMIN — ENOXAPARIN SODIUM 40 MG: 40 INJECTION SUBCUTANEOUS at 12:37

## 2023-06-14 RX ADMIN — INSULIN LISPRO 4 UNITS: 100 INJECTION, SOLUTION INTRAVENOUS; SUBCUTANEOUS at 08:04

## 2023-06-14 RX ADMIN — OXYCODONE HYDROCHLORIDE 10 MG: 10 TABLET ORAL at 14:48

## 2023-06-14 RX ADMIN — INSULIN LISPRO 6 UNITS: 100 INJECTION, SOLUTION INTRAVENOUS; SUBCUTANEOUS at 12:39

## 2023-06-14 RX ADMIN — VANCOMYCIN HYDROCHLORIDE 1000 MG: 1 INJECTION, SOLUTION INTRAVENOUS at 03:46

## 2023-06-14 RX ADMIN — OXYCODONE HYDROCHLORIDE 10 MG: 10 TABLET ORAL at 22:22

## 2023-06-14 RX ADMIN — ACETAMINOPHEN 650 MG: 325 TABLET, FILM COATED ORAL at 22:22

## 2023-06-14 NOTE — PROGRESS NOTES
Progress Note - Infectious Disease   Natalya Griffith 69 y o  male MRN: [id number]  Unit/Bed#: [facility] 625-01 Encounter: [id number]      Impression/Plan:  1  Left shoulder surgical site infection with abscess; possible prosthetic infection  • Status post reverse total shoulder arthroplasty done on 4/4/2023; status post incision and drainage of left shoulder on 5/2/2023 presenting with redness, swelling, tenderness associated with incision site  • CT left upper extremity:Localized fluid collection in the anterior aspect of left shoulder measuring 5 8 x 4 4 x 5 1 cm, which probably communicates with the glenohumeral joint  There are small foci of air within the collection, which may be related to superinfection or sequela of prior I&D procedure  • Gram stain from this admission with 3+ polys, 1+ gram variable rods  • Cultures from incision and drainage in 5/2/2023 showing cutibacterium acnes and cutibacterium avidum, also coagulase-negative staph and alphahemolytic strep in broth  • S/p explantation and spacer placement by ortho on 6/13/23; pt reporting pain postop, but denies any fevers, chills, chest pain, sob     • Hemodynamically stable  • Day 3 of Rocephin, day 3 of vancomycin-started 6/12/2023; will likely require prolonged antibiotics for 6 weeks IV and PICC line has been placed on 6/14/23     2  DMII; poorly controlled  • A1c of 8 4  • Management as per primary     3  Obesity  • Nutritional counseling and weight loss counseling at discharge     Antibiotics:  Rocephin- d3  Vancomycin - d3    Subjective:  Patient has no fever, chills, sweats; no nausea, vomiting, diarrhea; no cough, shortness of breath  No new symptoms  Reports post op pain, but pain is tolerable with current pain regimen  Is agreeable with plan for 6 week IV Abx      Objective:  Vitals:  Temp:  [96 5 °F (35 8 °C)-99 °F (37 2 °C)] 99 °F (37 2 °C)  HR:  [] 91  Resp:  [18] 18  BP: (115-136)/(56-72) 115/58  SpO2:  [95 %-100 %] 98 %  Temp (24hrs), Av 5 °F (36 4 °C), Min:96 5 °F (35 8 °C), Max:99 °F (37 2 °C)  Current: Temperature: 99 °F (37 2 °C)    Physical Exam:   General Appearance:  Alert, interactive, nontoxic, no acute distress  Throat: Oropharynx moist without lesions  Lungs:   Clear to auscultation bilaterally; no wheezes, rhonchi or rales; respirations unlabored   Heart:  RRR; no murmur, rub or gallop   Abdomen:   Soft, non-tender, non-distended, positive bowel sounds  Extremities: No clubbing, cyanosis or edema, L shoulder with dressing   Skin: No new rashes or lesions  No draining wounds noted  Labs:    All pertinent labs and imaging studies were personally reviewed  Results from last 7 days   Lab Units 23  0544 23  2215 23  0518   HEMOGLOBIN g/dL 12 0 11 8* 12 3   PLATELETS Thousands/uL 109* 90* 67*   WBC Thousand/uL 5 04 6 68 2 72*     Results from last 7 days   Lab Units 23  0544 23  0518 23  0459   ALK PHOS U/L  --   --  162*   ALT U/L  --   --  41   AST U/L  --   --  38   BUN mg/dL 26* 21 25   CALCIUM mg/dL 9 3 9 4 9 2   CHLORIDE mmol/L 105 110* 112*   CO2 mmol/L 23 24 22   CREATININE mg/dL 1 27 1 01 1 04   EGFR ml/min/1 73sq m 57 75 72   POTASSIUM mmol/L 4 3 3 7 4 0   SODIUM mmol/L 134* 138 137         Results from last 7 days   Lab Units 23  1438   CRP mg/L 27 0*               Micro:  Results from last 7 days   Lab Units 23  1712 23  1708 23  1707 23  1651 23  1650 23  1649 23  1701   BODY FLUID CULTURE, STERILE   --   --   --   --   --   --  No growth   GRAM STAIN RESULT  2+ Polys  No Polys or Bacteria seen No Polys or Bacteria seen Rare Disintegrating polys  No bacteria seen 2+ Polys 1+ Mononuclear Cells  No bacteria seen  1+ Polys  No bacteria seen No Polys or Bacteria seen 3+ Polys*  1+ Gram variable rods*       Imaging:

## 2023-06-14 NOTE — PROCEDURES
Insert Complex Venous Access Line    Date/Time: 6/14/2023 11:29 AM    Performed by: Aryan Cruz RN  Authorized by: David Loza, [address] [place] [address]    Patient location:  Bedside  Other Assisting Provider: No (Timeout with Farhan Lorenzana RN)    Consent:     Consent obtained:  Written (Provider obtained)    Consent given by:  Patient    Risks discussed:  Arterial puncture, bleeding, infection, incorrect placement, nerve damage and pneumothorax (Discussed by provider and [facility] RN)    Alternatives discussed:  Alternative treatment and no treatment (Provider discussed)  Universal protocol:     Procedure explained and questions answered to patient or proxy's satisfaction: yes      Relevant documents present and verified: yes      Test results available and properly labeled: yes      Radiology Images displayed and confirmed  If images not available, report reviewed: yes      Required blood products, implants, devices, and special equipment available: yes      Site/side marked: yes      Immediately prior to procedure, a time out was called: yes      Patient identity confirmed:  Verbally with patient, arm band, provided demographic data and hospital-assigned identification number  Pre-procedure details:     Hand hygiene: Hand hygiene performed prior to insertion      Sterile barrier technique: All elements of maximal sterile technique followed      Skin preparation:  ChloraPrep    Skin preparation agent: Skin preparation agent completely dried prior to procedure    Indications:     PICC line indications: long term antibiotics    Sedation:     Sedation type: None  Anesthesia (see MAR for exact dosages):      Anesthesia method:  Local infiltration    Local anesthetic:  Lidocaine 1% w/o epi (5 mL administered total with two attempts )  Procedure details:     Location:  Basilic    Vessel type: vein      Laterality:  Right    Site selection rationale:  Largest, most patent vessel    Approach: percutaneous technique used      Patient position: Flat    Procedural supplies:  Single lumen    Catheter size:  4 Fr    Landmarks identified: yes      Ultrasound guidance: yes      Ultrasound image availability:  Still images obtained (27% vessel occupancy  Image saved to chart )    Sterile ultrasound techniques: Sterile gel and sterile probe covers were used      Number of attempts:  2    Successful placement: yes      Vessel of catheter tip end:  Sherlock 3CG confirmed (OK to use  Sherlock 3CG confirmed placement  Results saved to chart )    Total catheter length (cm):  42    Catheter out on skin (cm):  0    Max flow rate:  999 mL/hr    Arm circumference:  33  Post-procedure details:     Post-procedure:  Dressing applied and securement device placed    Assessment:  Blood return through all ports and free fluid flow    Post-procedure complications: none      Patient tolerance of procedure:   Tolerated well, no immediate complications

## 2023-06-14 NOTE — PROGRESS NOTES
Peripheral Nerve Block Follow-up Note - Acute Pain Service    Tae Sharif 69 y o  male MRN: [id number]  Unit/Bed#: [facility] 625-01 Encounter: [id number]      Assessment:   Principal Problem:    Postoperative cellulitis of surgical wound  Active Problems:    Type 2 diabetes mellitus ([address] )    Essential hypertension    MEG (obstructive sleep apnea)    Insomnia    Preop exam for internal medicine    Tae Sharif is a 69y o  year old male with past medical history of type 2 diabetes, HTN, MEG, with history of recent shoulder replacement in April 5618 which was complicated by surgical wound infection for which she was hospitalized in early May and underwent I&D with orthopedics on 5/2/2023  On 6/11/2023 he again presented to the emergency department with noted swelling and erythema of the surgical wound site and started on IV antibiotics  He is now status post left shoulder resection of arthroplasty, insertion of antibiotic spacer with orthopedic surgery on 6/13/2023  He received a left-sided interscalene block with Exparel for postoperative analgesia  Acute pain service consulted for postoperative block follow-up  Patient seen and examined at bedside this morning, while sitting up in chair  Patient reports that initially after his surgery he felt as if his nerve block was working appropriately, however this was short-lived and now he reports no residual motor or sensory deficit of the left upper extremity, along with increased pain  He has been utilizing as needed opioid analgesics appropriately which he feels has been managing his pain appropriately  He denies any opioid-induced side effects  Plan:   Left-sided single shot interscalene block with Exparel has resolved at this time, patient denies any sensory deficit, there is also no apparent motor deficit  He reports a significant increase in pain overnight      - Multimodal analgesia with:  · Continue oxycodone 5/10 mg every 6 hours as needed, for moderate/severe pain  · Continue IV Dilaudid 0 5 mg every 6 hours as needed, for breakthrough pain  · If pain remains relatively well controlled on oral analgesics would recommend discontinuation of IV Dilaudid in next 24 hours  At time of discharge patient may discharge with oxycodone 5 mg for moderate/severe pain every 4 hours as needed x3 days  · Continue Tylenol 650 mg every 8 hours scheduled    Treatment plan discussed with primary care service and bedside nursing staff  APS will sign off at this time  Thank you for the consult  All opioids and other analgesics to be written at discretion of primary team  Please contact Acute Pain Service - [facility] via [employer] from [phone] with additional questions or concerns  See Tiger[employer] or Mann for additional contacts and after hours information  Pain History  Current pain location(s): Left shoulder  Pain Scale:   0-10/10  Quality: Deep aching  24 hour history: Underwent left shoulder revision yesterday  No acute events overnight, he remains hemodynamically stable and maintaining oxygen saturations on room air without any respiratory distress  Tolerating current medication regimen without any opioid-induced side effects  Patient has been up and ambulating with physical therapy this morning, currently sitting up in chair  Denies shortness of breath, nausea/vomiting, constipation/diarrhea      Opioid requirement previous 24 hours:   · 100 mcg IV fentanyl perioperatively  · 0 5 mg IV Dilaudid perioperatively  · 0 5 mg IV Dilaudid for breakthrough pain  · 20 mg oxycodone    Meds/Allergies   all current active meds have been reviewed and current meds:   Current Facility-Administered Medications   Medication Dose Route Frequency   • acetaminophen (TYLENOL) tablet 650 mg  650 mg Oral Q8H [facility]   • cefTRIAXone (ROCEPHIN) 2,000 mg in dextrose 5 % 50 mL IVPB  2,000 mg Intravenous Q24H   • diltiazem (CARDIZEM CD) 24 hr capsule 180 mg  180 mg Oral Daily   • enoxaparin (LOVENOX) subcutaneous injection 40 mg  40 mg Subcutaneous Q24H [address]   • HYDROmorphone (DILAUDID) injection 0 5 mg  0 5 mg Intravenous Q6H PRN   • insulin glargine (LANTUS) subcutaneous injection 20 Units 0 2 mL  20 Units Subcutaneous HS   • insulin lispro (HumaLOG) 100 units/mL subcutaneous injection 2-12 Units  2-12 Units Subcutaneous 4x Daily (AC & HS)   • oxyCODONE (ROXICODONE) immediate release tablet 10 mg  10 mg Oral Q6H PRN   • oxyCODONE (ROXICODONE) IR tablet 5 mg  5 mg Oral Q6H PRN   • polyethylene glycol (MIRALAX) packet 17 g  17 g Oral Daily PRN   • senna-docusate sodium (SENOKOT S) 8 6-50 mg per tablet 1 tablet  1 tablet Oral HS   • traZODone (DESYREL) tablet 50 mg  50 mg Oral HS   • vancomycin (VANCOCIN) 1500 mg in sodium chloride 0 9% 250 mL IVPB  1,500 mg Intravenous Q24H       Allergies   Allergen Reactions   • Sulfa Antibiotics Hives       Objective     Temp:  [96 5 °F (35 8 °C)-99 °F (37 2 °C)] 99 °F (37 2 °C)  HR:  [] 91  Resp:  [18] 18  BP: (115-136)/(56-72) 115/58    Physical Exam  Vitals reviewed  Constitutional:       General: He is not in acute distress  Appearance: He is not ill-appearing or toxic-appearing  Cardiovascular:      Rate and Rhythm: Normal rate  Pulmonary:      Effort: Pulmonary effort is normal  No respiratory distress  Abdominal:      General: There is no distension  Palpations: Abdomen is soft  There is no mass  Tenderness: There is no abdominal tenderness  Musculoskeletal:         General: Swelling (L shoulder) and tenderness (L shoulder) present  Normal range of motion  Cervical back: Normal range of motion  Right lower leg: No edema  Left lower leg: No edema  Skin:     General: Skin is warm and dry  Coloration: Skin is not pale  Findings: No rash  Neurological:      Mental Status: He is alert and oriented to person, place, and time  Mental status is at baseline  GCS: GCS eye subscore is 4  GCS verbal subscore is 5   GCS motor subscore is 6  Sensory: Sensation is intact  No sensory deficit  Psychiatric:         Attention and Perception: Attention normal          Mood and Affect: Mood normal          Speech: Speech normal          Behavior: Behavior normal  Behavior is cooperative  Lab Results:   Results from last 7 days   Lab Units 06/14/23  0544   HEMATOCRIT % 37 0   HEMOGLOBIN g/dL 12 0   PLATELETS Thousands/uL 109*   WBC Thousand/uL 5 04      Results from last 7 days   Lab Units 06/14/23  0544 06/13/23  0518 06/12/23  0459   ALK PHOS U/L  --   --  162*   ALT U/L  --   --  41   AST U/L  --   --  38   BUN mg/dL 26*   < > 25   CALCIUM mg/dL 9 3   < > 9 2   CHLORIDE mmol/L 105   < > 112*   CO2 mmol/L 23   < > 22   CREATININE mg/dL 1 27   < > 1 04   POTASSIUM mmol/L 4 3   < > 4 0    < > = values in this interval not displayed  Imaging Studies: I have personally reviewed pertinent reports  Please note that the APS provides consultative services regarding pain management only  With the exception of ketamine, peripheral nerve catheters, and epidural infusions (and except when indicated), final decisions regarding starting or changing doses of analgesic medications are at the discretion of the consulting service  Off hours consultation and/or medication management is generally not available      AMOR Medrano  Acute Pain Service

## 2023-06-14 NOTE — PLAN OF CARE
Problem: PAIN - ADULT  Goal: Verbalizes/displays adequate comfort level or baseline comfort level  Description: Interventions:  - Encourage patient to monitor pain and request assistance  - Assess pain using appropriate pain scale  - Administer analgesics based on type and severity of pain and evaluate response  - Implement non-pharmacological measures as appropriate and evaluate response  - Consider cultural and social influences on pain and pain management  - Notify physician/advanced practitioner if interventions unsuccessful or patient reports new pain  Outcome: Progressing     Problem: INFECTION - ADULT  Goal: Absence or prevention of progression during hospitalization  Description: INTERVENTIONS:  - Assess and monitor for signs and symptoms of infection  - Monitor lab/diagnostic results  - Monitor all insertion sites, i e  indwelling lines, tubes, and drains  - Monitor endotracheal if appropriate and nasal secretions for changes in amount and color  - [place] appropriate cooling/warming therapies per order  - Administer medications as ordered  - Instruct and encourage patient and family to use good hand hygiene technique  - Identify and instruct in appropriate isolation precautions for identified infection/condition  Outcome: Progressing  Goal: Absence of fever/infection during neutropenic period  Description: INTERVENTIONS:  - Monitor WBC    Outcome: Progressing     Problem: SAFETY ADULT  Goal: Patient will remain free of falls  Description: INTERVENTIONS:  - Educate patient/family on patient safety including physical limitations  - Instruct patient to call for assistance with activity   - Consult OT/PT to assist with strengthening/mobility   - Keep Call bell within reach  - Keep bed low and locked with side rails adjusted as appropriate  - Keep care items and personal belongings within reach  - Initiate and maintain comfort rounds  - Make Fall Risk Sign visible to staff  - Offer Toileting every  Hours, in advance of need  - Initiate/Maintain alarm  - Obtain necessary fall risk management equipment:   - Apply yellow socks and bracelet for high fall risk patients  - Consider moving patient to room near nurses station  Outcome: Progressing  Goal: Maintain or return to baseline ADL function  Description: INTERVENTIONS:  -  Assess patient's ability to carry out ADLs; assess patient's baseline for ADL function and identify physical deficits which impact ability to perform ADLs (bathing, care of mouth/teeth, toileting, grooming, dressing, etc )  - Assess/evaluate cause of self-care deficits   - Assess range of motion  - Assess patient's mobility; develop plan if impaired  - Assess patient's need for assistive devices and provide as appropriate  - Encourage maximum independence but intervene and supervise when necessary  - Involve family in performance of ADLs  - Assess for home care needs following discharge   - Consider OT consult to assist with ADL evaluation and planning for discharge  - Provide patient education as appropriate  Outcome: Progressing  Goal: Maintains/Returns to pre admission functional level  Description: INTERVENTIONS:  - Perform BMAT or MOVE assessment daily    - Set and communicate daily mobility goal to care team and patient/family/caregiver  - Collaborate with rehabilitation services on mobility goals if consulted  - Perform Range of Motion  times a day  - Reposition patient every  hours    - Dangle patient  times a day  - Stand patient  times a day  - Ambulate patient  times a day  - Out of bed to chair  times a day   - Out of bed for meals times a day  - Out of bed for toileting  - Record patient progress and toleration of activity level   Outcome: Progressing     Problem: Knowledge Deficit  Goal: Patient/family/caregiver demonstrates understanding of disease process, treatment plan, medications, and discharge instructions  Description: Complete learning assessment and assess knowledge base   Interventions:  - Provide teaching at level of understanding  - Provide teaching via preferred learning methods  Outcome: Progressing     Problem: DISCHARGE PLANNING  Goal: Discharge to home or other facility with appropriate resources  Description: INTERVENTIONS:  - Identify barriers to discharge w/patient and caregiver  - Arrange for needed discharge resources and transportation as appropriate  - Identify discharge learning needs (meds, wound care, etc )  - Arrange for interpr  Problem: Knowledge Deficit  Goal: Patient/family/caregiver demonstrates understanding of disease process, treatment plan, medications, and discharge instructions  Description: Complete learning assessment and assess knowledge base    Interventions:  - Provide teaching at level of understanding  - Provide teaching via preferred learning methods  Outcome: Progressing   etive services to assist at discharge as needed  - Refer to Case Management Department for coordinating discharge planning if the patient needs post-hospital services based on physician/advanced practitioner order or complex needs related to functional status, cognitive ability, or social support system  Outcome: Progressing

## 2023-06-14 NOTE — PROGRESS NOTES
[address] [facility]  Progress Note  Name: Hernesto Downing  MRN: [id number]  Unit/Bed#: [facility] 692-08 I Date of Admission: 6/11/2023   Date of Service: 6/14/2023 I Hospital Day: 3    Assessment/Plan   * Postoperative cellulitis of surgical wound  Assessment & Plan  Patient here with hx of left shoulder replacement 1/3980 complicated by site wound infection requiring admission from May 1 to May 5 status post I&D with Ortho 5/2  Evaluated by ID at this time  Presented back to the  with noted swelling and erythema of the surgical wound site, aspiration performed, cultures pending  Started on IV abx  · CT LUE without contrast shows Localized fluid collection in the anterior aspect of left shoulder measuring 5 8 x 4 4 x 5 1 cm, which probably communicates with the glenohumeral joint  There are small foci of air within the collection, which may be related to superinfection or sequela of prior I&D procedure  · Ortho following, s/p left shoulder resection arthroplasty and insertion of antibiotic spacer 6/13  · Hemovac in place  · ID following, continue Vanc/Rocephin  · Operative cultures are pending  · Will need 6 week course IV abx, place PICC  · Anticipate need for prolonged course IV abx  · NWB LUE in sling  · Pain control  · DVT ppx  · PT/OT, CM consult for d/c needs  Type 2 diabetes mellitus [facility])  Assessment & Plan  Lab Results   Component Value Date    HGBA1C 8 4 (H) 03/20/2023       Recent Labs     06/13/23  1906 06/13/23  1917 06/13/23  2124 06/14/23  0743   POCGLU 114 112 160* 228*     Home regimen on Glargine 40 units HS + Glimepiride 5 mg BID   · Increase lantus to 20 units QHS and continue SSI  · Diabetic diet  · Monitor accuchecks and adjust regimen PRN      Essential hypertension  Assessment & Plan  BP acceptable   Home regimen: Cardizem 180 mg QD and Losartan 100 mg QD   · Can continue Cardizem and hold losartan for now  · Monitor closely     MEG (obstructive sleep apnea)  Assessment & Plan  · Continue HS CPAP     Insomnia  Assessment & Plan  · Continue home med Trazodone 50 mg HS            VTE Pharmacologic Prophylaxis:   Moderate Risk (Score 3-4) - Pharmacological DVT Prophylaxis Ordered: enoxaparin (Lovenox)  Patient Centered Rounds: I performed bedside rounds with nursing staff today  Discussions with Specialists or Other Care Team Provider: nursing, case management, ID    Education and Discussions with Family / Patient: Patient declined call to   Total Time Spent on Date of Encounter in care of patient: 35 minutes This time was spent on one or more of the following: performing physical exam; counseling and coordination of care; obtaining or reviewing history; documenting in the medical record; reviewing/ordering tests, medications or procedures; communicating with other healthcare professionals and discussing with patient's family/caregivers  Current Length of Stay: 3 day(s)    Current Patient Status: Inpatient     Certification Statement: The patient will continue to require additional inpatient hospital stay due to awaiting operative cultures, ID/ortho clearance, coordination of home IV abx     Discharge Plan: Anticipate discharge in 48-72 hrs to home with home services  Code Status: Level 1 - Full Code    Subjective:   Complains of pain in left shoulder but overall controlled  Drain in place  No fevers, chills  Discussed plan, including need for prolonged course IV abx, patient in agreement  Consented for PICC line  Objective:     Vitals:   Temp (24hrs), Av 5 °F (36 4 °C), Min:96 5 °F (35 8 °C), Max:99 °F (37 2 °C)    Temp:  [96 5 °F (35 8 °C)-99 °F (37 2 °C)] 99 °F (37 2 °C)  HR:  [] 91  Resp:  [18] 18  BP: (115-136)/(56-72) 115/58  SpO2:  [95 %-100 %] 98 %  Body mass index is 34 87 kg/m²  Input and Output Summary (last 24 hours):      Intake/Output Summary (Last 24 hours) at 2023 1038  Last data filed at 6/14/2023 0025  Gross per 24 hour   Intake 800 ml   Output 350 ml   Net 450 ml       Physical Exam:   Physical Exam  Vitals and nursing note reviewed  Constitutional:       General: He is not in acute distress  Appearance: He is obese  Cardiovascular:      Rate and Rhythm: Normal rate  Pulmonary:      Breath sounds: Normal breath sounds  Musculoskeletal:      Comments: Left arm in sling, dressing CDI  hemovac noted with bloody drainage  Skin:     General: Skin is warm  Neurological:      Mental Status: He is alert and oriented to person, place, and time  Mental status is at baseline  Psychiatric:         Mood and Affect: Mood normal           Additional Data:     Labs:  Results from last 7 days   Lab Units 06/14/23  0544   EOS PCT % 5   HEMATOCRIT % 37 0   HEMOGLOBIN g/dL 12 0   LYMPHS PCT % 14   MONOS PCT % 12   NEUTROS PCT % 68   PLATELETS Thousands/uL 109*   WBC Thousand/uL 5 04     Results from last 7 days   Lab Units 06/14/23  0544 06/13/23  0518 06/12/23  0459   ANION GAP mmol/L 6   < > 3*   ALBUMIN g/dL  --   --  2 9*   ALK PHOS U/L  --   --  162*   ALT U/L  --   --  41   AST U/L  --   --  38   BUN mg/dL 26*   < > 25   CALCIUM mg/dL 9 3   < > 9 2   CHLORIDE mmol/L 105   < > 112*   CO2 mmol/L 23   < > 22   CREATININE mg/dL 1 27   < > 1 04   GLUCOSE RANDOM mg/dL 229*   < > 83   POTASSIUM mmol/L 4 3   < > 4 0   SODIUM mmol/L 134*   < > 137   TOTAL BILIRUBIN mg/dL  --   --  0 84    < > = values in this interval not displayed       Results from last 7 days   Lab Units 06/12/23  1703   INR  1 08     Results from last 7 days   Lab Units 06/14/23  0743 06/13/23  2124 06/13/23  1917 06/13/23  1906 06/13/23  1319 06/13/23  0530 06/12/23  2341 06/12/23  1803 06/12/23  1132 06/12/23  0526 06/12/23  0023 06/11/23  1907   POC GLUCOSE mg/dl 228* 160* 112 114 158* 106 164* 214* 215* 82 230* 196*               Lines/Drains:  Invasive Devices     Peripheral Intravenous Line  Duration Peripheral IV 06/11/23 Proximal;Right;Ventral (anterior) Forearm 2 days    Peripheral IV 06/13/23 Right Arm <1 day          Drain  Duration           Closed/Suction Drain Left Shoulder Accordion 10 Fr  <1 day                      Imaging: No pertinent imaging reviewed  Recent Cultures (last 7 days):   Results from last 7 days   Lab Units 06/13/23  1712 06/13/23  1708 06/13/23  1707 06/13/23  1651 06/13/23  1650 06/13/23  1649 06/11/23  1701   BODY FLUID CULTURE, STERILE   --   --   --   --   --   --  No growth   GRAM STAIN RESULT  2+ Polys  No Polys or Bacteria seen No Polys or Bacteria seen Rare Disintegrating polys  No bacteria seen 2+ Polys 1+ Polys  No bacteria seen  1+ Mononuclear Cells  No bacteria seen No Polys or Bacteria seen 3+ Polys*  1+ Gram variable rods*       Last 24 Hours Medication List:   Current Facility-Administered Medications   Medication Dose Route Frequency Provider Last Rate   • acetaminophen  650 mg Oral Q6H PRN Lionel Garcia MD     • cefTRIAXone  2,000 mg Intravenous Q24H Lionel Garcia MD 2,000 mg (06/12/23 1945)   • diltiazem  180 mg Oral Daily Lionel Gacria MD     • enoxaparin  40 mg Subcutaneous Q24H [address] AMOR Cobian     • HYDROmorphone  0 5 mg Intravenous Q6H PRN Lionel Garcia MD     • insulin glargine  20 Units Subcutaneous HS AMOR Cobian     • insulin lispro  2-12 Units Subcutaneous 4x Daily (AC & HS) Beulah Castanon PA-C     • oxyCODONE  10 mg Oral Q6H PRN Lionel Garcia MD     • oxyCODONE  5 mg Oral Q6H PRN Lionel Garcia MD     • polyethylene glycol  17 g Oral Daily PRN Lionel Garcia MD     • senna-docusate sodium  1 tablet Oral HS Lionel Garcia MD     • traZODone  50 mg Oral HS Lionel Garcia MD     • vancomycin  1,500 mg Intravenous Q24H Garrick Hadley MD          Today, Patient Was Seen By: AMOR Sol    **Please Note: This note may have been constructed using a voice recognition system  **

## 2023-06-14 NOTE — ASSESSMENT & PLAN NOTE
BP acceptable   Home regimen: Cardizem 180 mg QD and Losartan 100 mg QD   · Can continue Cardizem and hold losartan for now  · Monitor closely

## 2023-06-14 NOTE — PROGRESS NOTES
Progress Note - Orthopedics   Neri Duncan 69 y o  male MRN: [id number]  Unit/Bed#: [facility] 625-01      Subjective:    69 y o male POD 1 left shoulder resection arthroplasty, insertion of antibiotic spacer  No acute events, no new complaints  Patient doing well  Pain well controlled  Denies fevers, chills, CP, SOB, N/V, numbness or tingling       Labs:  0   Lab Value Date/Time    CRP 27 0 (H) 06/11/2023 1438    ESR 85 (H) 06/11/2023 1438    HCT 38 3 06/13/2023 2215    HCT 38 0 06/13/2023 0518    HCT 35 5 (L) 06/12/2023 0459    HGB 11 8 (L) 06/13/2023 2215    HGB 12 3 06/13/2023 0518    HGB 11 2 (L) 06/12/2023 0459    INR 1 08 06/12/2023 1703    WBC 6 68 06/13/2023 2215    WBC 2 72 (L) 06/13/2023 0518    WBC 3 50 (L) 06/12/2023 0459       Meds:    Current Facility-Administered Medications:   •  acetaminophen (TYLENOL) tablet 650 mg, 650 mg, Oral, Q6H PRN, Adithya Landon MD  •  cefTRIAXone (ROCEPHIN) 2,000 mg in dextrose 5 % 50 mL IVPB, 2,000 mg, Intravenous, Q24H, Adithya Landon MD, Last Rate: 100 mL/hr at 06/12/23 1945, 2,000 mg at 06/12/23 1945  •  diltiazem (CARDIZEM CD) 24 hr capsule 180 mg, 180 mg, Oral, Daily, Adithya Landon MD, 180 mg at 06/13/23 0900  •  HYDROmorphone (DILAUDID) injection 0 5 mg, 0 5 mg, Intravenous, Q6H PRN, Adithya Landon MD, 0 5 mg at 06/14/23 0353  •  insulin glargine (LANTUS) subcutaneous injection 15 Units 0 15 mL, 15 Units, Subcutaneous, HS, Adithya Landon MD, 15 Units at 06/13/23 6351  •  insulin lispro (HumaLOG) 100 units/mL subcutaneous injection 2-12 Units, 2-12 Units, Subcutaneous, 4x Daily (AC & HS), 2 Units at 06/13/23 2150 **AND** Fingerstick Glucose (POCT), , , 4x Daily AC and at bedtime, Blaine Cevallos PA-C  •  oxyCODONE (ROXICODONE) immediate release tablet 10 mg, 10 mg, Oral, Q6H PRN, Adithya Landon MD, 10 mg at 06/14/23 0014  •  oxyCODONE (ROXICODONE) IR tablet 5 mg, 5 mg, Oral, Q6H PRN, Adithya Landon MD  •  polyethylene glycol (MIRALAX) packet 17 g, 17 g, Oral, Daily PRN, Jorden Josie Beltrán MD, 17 g at 06/12/23 9850  •  senna-docusate sodium (SENOKOT S) 8 6-50 mg per tablet 1 tablet, 1 tablet, Oral, HS, Yumi Lux MD, 1 tablet at 06/13/23 2151  •  traZODone (DESYREL) tablet 50 mg, 50 mg, Oral, HS, Yumi Lux MD, 50 mg at 06/13/23 2150  •  vancomycin (VANCOCIN) IVPB (premix in dextrose) 1,000 mg 200 mL, 1,000 mg, Intravenous, Q12H, Yumi Lux MD, Last Rate: 200 mL/hr at 06/14/23 0346, 1,000 mg at 06/14/23 0346    Blood Culture:   Lab Results   Component Value Date    BLOODCX No Growth After 5 Days  09/13/2022    BLOODCX No Growth After 5 Days  09/13/2022       Wound Culture:   Lab Results   Component Value Date    WOUNDCULT 1+ Growth of Klebsiella oxytoca (A) 09/14/2022    WOUNDCULT 1+ Growth of 09/14/2022       Ins and Outs:  I/O last 24 hours: In: 800 [I V :800]  Out: 350 [Drains:50; Blood:300]          Physical:  Vitals:    06/14/23 0318   BP: 115/58   Pulse: 81   Resp:    Temp:    SpO2: 95%     Musculoskeletal: left Upper Extremity  · Dressing c/d/i  · HV drain in place, output 50 overnight, serosanguinous   · TTP celina-incisionally  · Sensation intact to median/radial/ulnar nerve distribution   · Motor intact anterior interosseous nerve/posterior interosseous nerve/median/radial/ulnar nerve distributions  · 2+ radial pulse, symmetric bilaterally  · Digits warm and well perfused  · Capillary refill < 2 seconds    Assessment:    67 y o male POD 1 left shoulder resection arthroplasty, insertion of antibiotic spacer  Patient doing well       Plan:  · NWB LUE in sling  · Abs as per IF, will need PICC line  · F/u cultures  · Monitor drain output  · Will monitor for ABLA and administer IVF/prbc as indicated for Greater than 2 gram drop or Hgb < 7  · PT/OT  · Pain control  · DVT ppx - per primary  · Rest of care per primary team  · Dispo: Ortho will follow    Liz Hinojosa MD

## 2023-06-14 NOTE — OCCUPATIONAL THERAPY NOTE
Occupational Therapy Evaluation     Patient Name: Stoney Harrison  JWJPO'N Date: 6/14/2023  Problem List  Principal Problem:    Postoperative cellulitis of surgical wound  Active Problems:    Type 2 diabetes mellitus ([address] )    Essential hypertension    MEG (obstructive sleep apnea)    Insomnia    Past Medical History  Past Medical History:   Diagnosis Date    Arrhythmia     CPAP (continuous positive airway pressure) dependence     Diabetes mellitus ([address] )     History of echocardiogram 11/14/2017    showed EF of 50-55 percentWith moderate LVH and left ventricle diastolic dysfunction  Left atrium was moderately enlarged  Trace MR noted  History of Holter monitoring 11/21/2017    showed baseline rhythm of sinus origin with an average heart it of 61 bpm  The lowest heart rate was 49 and the highest heart rate was 10 8 bpm  There were rare single VPCs, and frequent PACs representing 3 2% of total beats  There were several episodes of sinus arrhythmias with sinus bradycardia and heart rate ranging from 40-90 bpm  No sustained dysrhythmias, or pauses noted   The patient did not    Hypertension     Liver disease     Obese     Sleep apnea      Past Surgical History  Past Surgical History:   Procedure Laterality Date    CATARACT EXTRACTION      EYE SURGERY Left 1997    HERNIA REPAIR  [id number]    KNEE ARTHROPLASTY Right 2008    SC ARTHROPLASTY GLENOHUMERAL JOINT TOTAL SHOULDER Left 4/4/2023    Procedure: ARTHROPLASTY SHOULDER REVERSE;  Surgeon: Kumar Russell MD;  [facility]: [facility];  Service: Orthopedics    SHOULDER SURGERY Right 2002    WOUND DEBRIDEMENT Left 5/2/2023    Procedure: INCISION AND DRAINAGE (I&D) EXTREMITY, vac placement;  Surgeon: Kumar Russell MD;  Location[facility];  Service: Orthopedics        06/14/23 1147   OT Last Visit   OT Visit Date 06/14/23   Note Type   Note type Evaluation   Pain Assessment   Pain Assessment Tool 0-10   Pain Score No Pain   Hospital Pain Intervention(s) Repositioned; Ambulation/increased activity   Restrictions/Precautions   Weight Bearing Precautions Per Order Yes   LUE Weight Bearing Per Order (S)  NWB   Braces or Orthoses Sling   Other Precautions WBS; Multiple lines;Telemetry   Home Living   Type of [facility] Two level; Able to live on main level with bedroom/bathroom;Stairs to enter without rails   Bathroom Shower/Tub Tub/shower unit   Bathroom Toilet Standard   Bathroom Equipment   (Pt reports no bathroom equipment )   Home Equipment Walker;Cane   Additional Comments Pt reports not using DME prior to admission   Prior Function   Level of [place] Independent with ADLs; Independent with functional mobility; Independent with IADLS   Lives With Spouse   Receives Help From Family   IADLs Independent with driving; Independent with meal prep; Independent with medication management   Falls in the last 6 months 0   Vocational Full time employment   Lifestyle   Autonomy Pt I with ADLs, IADLs, and functional mobility prior to admission  Reciprocal Relationships Pt lives at home with supportive wife  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  Intrinsic Gratification Pt enjoys traveling  ADL   Where Assessed Edge of bed   Eating Assistance 7  Independent   Grooming Assistance 5  Supervision/Setup   UB Bathing Assistance 5  Supervision/Setup   LB Bathing Assistance 5  Supervision/Setup   UB Dressing Assistance 5  Supervision/Setup   LB Dressing Assistance 5  Supervision/Setup   Toileting Assistance  5  Supervision/Setup   Functional Assistance 5  Supervision/Setup   Bed Mobility   Additional Comments Pt OOB in chair at time of OT eval    Transfers   Sit to Stand 5  Supervision   Additional items Increased time required;Verbal cues   Stand to Sit 5  Supervision   Additional items Increased time required;Verbal cues   Additional Comments Pt left in chair with all needs in reach     Functional Mobility   Functional Mobility 5 Supervision   Additional Comments Increased time required; verbal cues   Additional items   (No AD utilized )   Balance   Static Sitting Good   Dynamic Sitting Fair +   Static Standing Fair +   Dynamic Standing Fair   Ambulatory Fair   Activity Tolerance   Activity Tolerance Patient tolerated treatment well   Medical Staff Made Aware Bethany OT; Emily Lion, PT; Jaye Toney, SPT; seen with PT due to pt's current medical status  Nurse Made Aware Pt appropriate to see per nsg  RUE Assessment   RUE Assessment WFL   LUE Assessment   LUE Assessment X  (Decreased ROM/strength due to pain, NWB, and sling )   Cognition   Overall Cognitive Status WFL   Arousal/Participation Alert; Responsive; Cooperative   Attention Within functional limits   Orientation Level Oriented X4   Memory Within functional limits   Following Commands Follows all commands and directions without difficulty   Comments Overall G direction following and cooperation  G insight to self/condition and safety awareness  Assessment   Assessment Pt admitted to [facility] on [facility]23 due to L shoulder surgical site infection with abscess  Initial L TSA shoulder replacement surgery took place on 4/4/23  Pt has a past medical history of Arrhythmia, CPAP (continuous positive airway pressure) dependence, Diabetes mellitus ([facility] [address] ), History of echocardiogram, History of Holter monitoring, Hypertension, Liver disease, Obese, and Sleep apnea  Surgery performed on 6/13/23 resulting in a L shoulder resection arthoplasty and insertion of antibiotic spacer  Pt A&O x4 this date  Per ortho, NWB LUE and pt to maintain sling for comfort  Per ortho, pt is authorized to do elbow and wrist exercises with PT/OT  Prior to admission, pt from home with spouse and was independent with ADLs, IADLs, and functional mobility  Pt is currently supervision for bed mobility, transfers, functional mobility, and ADLs   Limitations include decreased dynamic sitting balance, decreased dynamic standing balance, "increased pain, decreased ROM/strength in LUE, and decreased independence in ADLs  Pt educated on importance of OOB activities, slow controlled movements with functional mobility, and compensatory dressing techniques  The patient's raw score on the AM-PAC Daily Activity Inpatient Short Form is 19  A raw score of greater than or equal to 19 suggests the patient may benefit from discharge to home  Please refer to the recommendation of the Occupational Therapist for safe discharge planning  Pt was receiving outpatient occupational therapy services  OT recommends pt to be d/c to home with increased family support and continue outpatient OT     Goals   Patient Goals \"to go home\"   Recommendation   OT Discharge Recommendation No rehabilitation needs   AM-PAC Daily Activity Inpatient   Lower Body Dressing 3   Bathing 3   Toileting 3   Upper Body Dressing 3   Grooming 3   Eating 4   Daily Activity Raw Score 19   Daily Activity Standardized Score (Calc for Raw Score >=11) 40 22   AM-PAC Applied Cognition Inpatient   Following a Speech/Presentation 4   Understanding Ordinary Conversation 4   Taking Medications 4   Remembering Where Things Are Placed or Put Away 4   Remembering List of 4-5 Errands 4   Taking Care of Complicated Tasks 4   Applied Cognition Raw Score 24   Applied Cognition Standardized Score 62 21     Cole Llanes, OTS       "

## 2023-06-14 NOTE — ASSESSMENT & PLAN NOTE
Lab Results   Component Value Date    HGBA1C 8 4 (H) 03/20/2023       Recent Labs     06/13/23  1906 06/13/23  1917 06/13/23  2124 06/14/23  0743   POCGLU 114 112 160* 228*     Home regimen on Glargine 40 units HS + Glimepiride 5 mg BID   · Increase lantus to 20 units QHS and continue SSI  · Diabetic diet  · Monitor accuchecks and adjust regimen PRN

## 2023-06-14 NOTE — PROGRESS NOTES
Tae Sharif is a 69 y o  male who is currently ordered Vancomycin IV with management by the Pharmacy Consult service  Relevant clinical data and objective / subjective history reviewed  Vancomycin Assessment:  Indication and Goal AUC/Trough: Bone/joint infection (goal -600, trough >10); Soft tissue (goal -600, trough >10), -600, trough >10  Clinical Status: stable  Micro:     Renal Function:  SCr: 1 27 mg/dL  CrCl: 70 2 mL/min  Renal replacement: Not on dialysis  Days of Therapy: 3  Current Dose: 1000 mg IV q12h  Vancomycin Plan:  New Dosin mg IV q24h  Estimated AUC: 434 mcg*hr/mL  Estimated Trough: 13 1 mcg/mL  Next Level:  @ 0600  Renal Function Monitoring: Daily BMP and [place] will continue to follow closely for s/sx of nephrotoxicity, infusion reactions and appropriateness of therapy  BMP and CBC will be ordered per protocol  We will continue to follow the patient’s culture results and clinical progress daily      Karli Dudley, Pharmacist

## 2023-06-14 NOTE — PHYSICAL THERAPY NOTE
PHYSICAL THERAPY EVALUATION          Patient Name: Fabrice Marcano  [id number] Date: 6/14/2023 06/14/23 1145   PT Last Visit   PT Visit Date 06/14/23   Note Type   Note type Evaluation   Pain Assessment   Pain Assessment Tool 0-10   Pain Score No Pain   Restrictions/Precautions   Weight Bearing Precautions Per Order Yes   LUE Weight Bearing Per Order (S)  NWB  (In sling)   Braces or Orthoses Sling   Other Precautions Multiple lines;Telemetry;WBS   Home Living   Type of [address] [place] [address] Two level; Able to live on main level with bedroom/bathroom;Stairs to enter without rails  (1 [address]ID[address]  Pt reports his bathroom is in the basement but is able to use his wife's bathroom on the main floor )   Bathroom Shower/Tub Tub/shower unit   Bathroom Toilet Standard   Bathroom Equipment Other (Comment)  (None\)   [address] [place]   Prior Function   Level of [place] Independent with ADLs; Independent with functional mobility; Independent with IADLS   Lives With Spouse  (Spouse is able to physically assist pt as needed )   Receives Help From Family  (Daughter lives nearby )   IADLs Independent with driving; Independent with meal prep; Independent with medication management   Falls in the last 6 months 0   Vocational Full time employment  (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 )   Comments Pt does not use an AD at baseline  Cognition   Overall Cognitive Status WFL   Arousal/Participation Cooperative   Orientation Level Oriented X4   Memory Within functional limits   Following Commands Follows all commands and directions without difficulty   Comments Pt with good safety awareness and insight to current condition     RLE Assessment   RLE Assessment WFL   Strength RLE   RLE Overall Strength 4/5   LLE Assessment   LLE Assessment WFL   Strength LLE   LLE Overall Strength 4/5   Bed Mobility Additional Comments Pt OOB in chair at time of PT eval    Transfers   Sit to Stand 5  Supervision   Additional items Armrests; Increased time required   Stand to Sit 5  Supervision   Additional items Armrests; Increased time required   Ambulation/Elevation   Gait pattern Decreased foot clearance;Decreased hip extension;Decreased heel strike; Wide NAVEED   Gait Assistance 5  Supervision   Additional items Verbal cues   Assistive Device None   Distance 80 ft, stairs, 80 ft   Stair Management Assistance 5  Supervision   Additional items Verbal cues   Stair Management Technique One rail R;Alternating pattern;Reciprocal   Number of Stairs 5   Balance   Static Sitting Good   Dynamic Sitting Fair +   Static Standing Fair +   Dynamic Standing Fair   Ambulatory Fair   Activity Tolerance   Activity Tolerance Patient tolerated treatment well   Medical Staff Made Helen, PT; Bethany OT; Benito Elias  Seen with OT d/t pt's current medical status  Nurse Made Aware Okay per RN   Assessment   Prognosis Good   Problem List Decreased strength;Decreased range of motion;Decreased endurance;Decreased mobility;Orthopedic restrictions   Assessment Tana Bal is a 69 y o  male admitted to B on 6/11/23 with primary Dx of post-op cellulitis of surgical wound  Pt underwent surgery for L reverse total shoulder arthroplasty on 4/4/23  On 5/2/23, pt received incision and drainage of L shoulder  Pt underwent revision surgery of L total shoulder arthroplasty on 6/13/23  Per ortho, pt is NEY LEVY, in sling  Additional active problems include T2DM, essential HTN, MEG, insomnia  Pt has a past medical history of Arrhythmia, CPAP (continuous positive airway pressure) dependence, Diabetes mellitus ([facility] [address] ), History of echocardiogram, History of Holter monitoring, Hypertension, Liver disease, Obese, and Sleep apnea  Pt A&O x4 on this date and denies c/o pain  PT consulted for evaluation of pt's functional mobility and D/C needs   Pt has a moderate medical complexity recent surgical procedure, WBS, decreased activity tolerance compared to baseline, increased assistance needed, PMH  PTA, pt was independent with ADLs, IADLs, functional mobility, and driving  Pt does not use an AD at baseline  Upon evaluation, pt is supervision for bed mobility, transfers, ambulation, and stair negotiation  Pt is currently not using an AD  Pt's current functional limitations include multiple lines, WBS, decreased endurance, decreased LE strength  Following PT eval, pt was left OOB in chair with cell phone, call bell, and personal belongings in reach  The patient's raw score on the AM-PAC Daily Activity Inpatient Short Form is 20  A raw score of greater than or equal to 19 suggests the patient may benefit from discharge to home  Please refer to the recommendation of the Physical Therapist for safe discharge planning  Due to pt nearing baseline LOF and increased family support, acute care PT is not needed at this time  All questions and concerns regarding return to home addressed with the pt  Anticipate no further needs  PT recommends D/C home with OPPT once pt is medically cleared     Barriers to Discharge None   Goals   Patient Goals To go home and get back to work   Plan   PT Frequency Other (Comment)  (DC IPPT)   Recommendation   PT Discharge Recommendation Home with outpatient rehabilitation   [facility] Basic Mobility Inpatient   Turning in Flat Bed Without Bedrails 4   Lying on Back to Sitting on Edge of Flat Bed Without Bedrails 4   Moving Bed to Chair 3   Standing Up From Chair Using Arms 3   Walk in Room 3   Climb 3-5 Stairs With Railing 3   Basic Mobility Inpatient Raw Score 20   Basic Mobility Standardized Score 43 99   Highest Level Of Mobility   JH-HLM Goal 6: Walk 10 steps or more   JH-HLM Achieved 7: Walk 25 feet or more   Modified Eden Scale   Modified Eden Scale 3   Barthel Index   Feeding 5   Bathing 5   Grooming Score 5   Dressing Score 5   Bladder Score 10 Bowels Score 10   Toilet Use Score 10   Transfers (Bed/Chair) Score 10   Mobility (Level Surface) Score 10   Stairs Score 5   Barthel Index Score 75   Lit Grigsby, SPT

## 2023-06-14 NOTE — ASSESSMENT & PLAN NOTE
Patient here with hx of left shoulder replacement 8/4757 complicated by site wound infection requiring admission from May 1 to May 5 status post I&D with Ortho 5/2  Evaluated by ID at this time  Presented back to the  with noted swelling and erythema of the surgical wound site, aspiration performed, cultures pending  Started on IV abx  · CT LUE without contrast shows Localized fluid collection in the anterior aspect of left shoulder measuring 5 8 x 4 4 x 5 1 cm, which probably communicates with the glenohumeral joint  There are small foci of air within the collection, which may be related to superinfection or sequela of prior I&D procedure  · Ortho following, s/p left shoulder resection arthroplasty and insertion of antibiotic spacer 6/13  · Hemovac in place  · ID following, continue Vanc/Rocephin  · Operative cultures are pending  · Will need 6 week course IV abx, place PICC  · Anticipate need for prolonged course IV abx  · NWB LUE in sling  · Pain control  · DVT ppx  · PT/OT, CM consult for d/c needs

## 2023-06-15 ENCOUNTER — APPOINTMENT (OUTPATIENT)
Dept: PHYSICAL THERAPY | Facility: CLINIC | Age: 68
End: 2023-06-15
Payer: MEDICARE

## 2023-06-15 PROBLEM — R01.1 MURMUR: Status: ACTIVE | Noted: 2023-06-15

## 2023-06-15 PROBLEM — E66.811 OBESITY (BMI 30.0-34.9): Status: ACTIVE | Noted: 2022-12-08

## 2023-06-15 PROBLEM — N18.9 ACUTE KIDNEY INJURY SUPERIMPOSED ON CHRONIC KIDNEY DISEASE (HCC): Status: ACTIVE | Noted: 2023-06-15

## 2023-06-15 PROBLEM — E66.9 OBESITY (BMI 30.0-34.9): Status: ACTIVE | Noted: 2022-12-08

## 2023-06-15 PROBLEM — N17.9 ACUTE KIDNEY INJURY SUPERIMPOSED ON CHRONIC KIDNEY DISEASE (HCC): Status: ACTIVE | Noted: 2023-06-15

## 2023-06-15 LAB
ABO GROUP BLD BPU: NORMAL
ABO GROUP BLD BPU: NORMAL
ANION GAP SERPL CALCULATED.3IONS-SCNC: 5 MMOL/L (ref 4–13)
BACTERIA UR QL AUTO: ABNORMAL /HPF
BASOPHILS # BLD AUTO: 0.04 THOUSANDS/ÂΜL (ref 0–0.1)
BASOPHILS NFR BLD AUTO: 1 % (ref 0–1)
BILIRUB UR QL STRIP: NEGATIVE
BPU ID: NORMAL
BPU ID: NORMAL
BUN SERPL-MCNC: 33 MG/DL (ref 5–25)
CALCIUM SERPL-MCNC: 8.9 MG/DL (ref 8.3–10.1)
CHLORIDE SERPL-SCNC: 105 MMOL/L (ref 96–108)
CLARITY UR: CLEAR
CO2 SERPL-SCNC: 24 MMOL/L (ref 21–32)
COLOR UR: YELLOW
CREAT SERPL-MCNC: 1.49 MG/DL (ref 0.6–1.3)
CROSSMATCH: NORMAL
CROSSMATCH: NORMAL
EOSINOPHIL # BLD AUTO: 0.36 THOUSAND/ÂΜL (ref 0–0.61)
EOSINOPHIL NFR BLD AUTO: 6 % (ref 0–6)
ERYTHROCYTE [DISTWIDTH] IN BLOOD BY AUTOMATED COUNT: 15.3 % (ref 11.6–15.1)
GFR SERPL CREATININE-BSD FRML MDRD: 47 ML/MIN/1.73SQ M
GLUCOSE SERPL-MCNC: 176 MG/DL (ref 65–140)
GLUCOSE SERPL-MCNC: 187 MG/DL (ref 65–140)
GLUCOSE SERPL-MCNC: 282 MG/DL (ref 65–140)
GLUCOSE SERPL-MCNC: 304 MG/DL (ref 65–140)
GLUCOSE SERPL-MCNC: 335 MG/DL (ref 65–140)
GLUCOSE UR STRIP-MCNC: ABNORMAL MG/DL
HCT VFR BLD AUTO: 31.9 % (ref 36.5–49.3)
HGB BLD-MCNC: 10.3 G/DL (ref 12–17)
HGB UR QL STRIP.AUTO: NEGATIVE
HYALINE CASTS #/AREA URNS LPF: ABNORMAL /LPF
IMM GRANULOCYTES # BLD AUTO: 0.02 THOUSAND/UL (ref 0–0.2)
IMM GRANULOCYTES NFR BLD AUTO: 0 % (ref 0–2)
KETONES UR STRIP-MCNC: NEGATIVE MG/DL
LEUKOCYTE ESTERASE UR QL STRIP: NEGATIVE
LYMPHOCYTES # BLD AUTO: 1.13 THOUSANDS/ÂΜL (ref 0.6–4.47)
LYMPHOCYTES NFR BLD AUTO: 19 % (ref 14–44)
MCH RBC QN AUTO: 27.6 PG (ref 26.8–34.3)
MCHC RBC AUTO-ENTMCNC: 32.3 G/DL (ref 31.4–37.4)
MCV RBC AUTO: 86 FL (ref 82–98)
MONOCYTES # BLD AUTO: 0.74 THOUSAND/ÂΜL (ref 0.17–1.22)
MONOCYTES NFR BLD AUTO: 13 % (ref 4–12)
NEUTROPHILS # BLD AUTO: 3.53 THOUSANDS/ÂΜL (ref 1.85–7.62)
NEUTS SEG NFR BLD AUTO: 61 % (ref 43–75)
NITRITE UR QL STRIP: NEGATIVE
NON-SQ EPI CELLS URNS QL MICRO: ABNORMAL /HPF
NRBC BLD AUTO-RTO: 0 /100 WBCS
PH UR STRIP.AUTO: 5.5 [PH]
PLATELET # BLD AUTO: 103 THOUSANDS/UL (ref 149–390)
PMV BLD AUTO: 13.8 FL (ref 8.9–12.7)
POTASSIUM SERPL-SCNC: 3.9 MMOL/L (ref 3.5–5.3)
PROT UR STRIP-MCNC: ABNORMAL MG/DL
RBC # BLD AUTO: 3.73 MILLION/UL (ref 3.88–5.62)
RBC #/AREA URNS AUTO: ABNORMAL /HPF
SODIUM SERPL-SCNC: 134 MMOL/L (ref 135–147)
SP GR UR STRIP.AUTO: 1.02 (ref 1–1.03)
UNIT DISPENSE STATUS: NORMAL
UNIT DISPENSE STATUS: NORMAL
UNIT PRODUCT CODE: NORMAL
UNIT PRODUCT CODE: NORMAL
UNIT PRODUCT VOLUME: 350 ML
UNIT PRODUCT VOLUME: 350 ML
UNIT RH: NORMAL
UNIT RH: NORMAL
UROBILINOGEN UR STRIP-ACNC: <2 MG/DL
WBC # BLD AUTO: 5.82 THOUSAND/UL (ref 4.31–10.16)
WBC #/AREA URNS AUTO: ABNORMAL /HPF

## 2023-06-15 PROCEDURE — 81001 URINALYSIS AUTO W/SCOPE: CPT | Performed by: NURSE PRACTITIONER

## 2023-06-15 PROCEDURE — NC001 PR NO CHARGE: Performed by: STUDENT IN AN ORGANIZED HEALTH CARE EDUCATION/TRAINING PROGRAM

## 2023-06-15 PROCEDURE — 99232 SBSQ HOSP IP/OBS MODERATE 35: CPT | Performed by: STUDENT IN AN ORGANIZED HEALTH CARE EDUCATION/TRAINING PROGRAM

## 2023-06-15 PROCEDURE — 99232 SBSQ HOSP IP/OBS MODERATE 35: CPT | Performed by: NURSE PRACTITIONER

## 2023-06-15 PROCEDURE — 82948 REAGENT STRIP/BLOOD GLUCOSE: CPT

## 2023-06-15 PROCEDURE — 85025 COMPLETE CBC W/AUTO DIFF WBC: CPT

## 2023-06-15 PROCEDURE — 80048 BASIC METABOLIC PNL TOTAL CA: CPT

## 2023-06-15 RX ORDER — AMOXICILLIN 250 MG
2 CAPSULE ORAL
Status: DISCONTINUED | OUTPATIENT
Start: 2023-06-15 | End: 2023-06-19 | Stop reason: HOSPADM

## 2023-06-15 RX ADMIN — OXYCODONE HYDROCHLORIDE 10 MG: 10 TABLET ORAL at 20:20

## 2023-06-15 RX ADMIN — ENOXAPARIN SODIUM 40 MG: 40 INJECTION SUBCUTANEOUS at 09:26

## 2023-06-15 RX ADMIN — HYDROMORPHONE HYDROCHLORIDE 0.5 MG: 1 INJECTION, SOLUTION INTRAMUSCULAR; INTRAVENOUS; SUBCUTANEOUS at 09:26

## 2023-06-15 RX ADMIN — INSULIN LISPRO 8 UNITS: 100 INJECTION, SOLUTION INTRAVENOUS; SUBCUTANEOUS at 17:52

## 2023-06-15 RX ADMIN — DILTIAZEM HYDROCHLORIDE 180 MG: 180 CAPSULE, COATED, EXTENDED RELEASE ORAL at 09:27

## 2023-06-15 RX ADMIN — ACETAMINOPHEN 650 MG: 325 TABLET, FILM COATED ORAL at 05:21

## 2023-06-15 RX ADMIN — POLYETHYLENE GLYCOL 3350 17 G: 17 POWDER, FOR SOLUTION ORAL at 14:57

## 2023-06-15 RX ADMIN — INSULIN LISPRO 2 UNITS: 100 INJECTION, SOLUTION INTRAVENOUS; SUBCUTANEOUS at 09:27

## 2023-06-15 RX ADMIN — INSULIN LISPRO 8 UNITS: 100 INJECTION, SOLUTION INTRAVENOUS; SUBCUTANEOUS at 21:39

## 2023-06-15 RX ADMIN — INSULIN LISPRO 6 UNITS: 100 INJECTION, SOLUTION INTRAVENOUS; SUBCUTANEOUS at 11:39

## 2023-06-15 RX ADMIN — ACETAMINOPHEN 650 MG: 325 TABLET, FILM COATED ORAL at 21:41

## 2023-06-15 RX ADMIN — HYDROMORPHONE HYDROCHLORIDE 0.5 MG: 1 INJECTION, SOLUTION INTRAMUSCULAR; INTRAVENOUS; SUBCUTANEOUS at 17:23

## 2023-06-15 RX ADMIN — CEFTRIAXONE SODIUM 2000 MG: 10 INJECTION, POWDER, FOR SOLUTION INTRAVENOUS at 17:54

## 2023-06-15 RX ADMIN — OXYCODONE HYDROCHLORIDE 10 MG: 10 TABLET ORAL at 11:38

## 2023-06-15 RX ADMIN — SODIUM CHLORIDE 1000 ML: 0.9 INJECTION, SOLUTION INTRAVENOUS at 14:47

## 2023-06-15 RX ADMIN — TRAZODONE HYDROCHLORIDE 50 MG: 50 TABLET ORAL at 21:41

## 2023-06-15 RX ADMIN — INSULIN GLARGINE 20 UNITS: 100 INJECTION, SOLUTION SUBCUTANEOUS at 21:40

## 2023-06-15 RX ADMIN — OXYCODONE HYDROCHLORIDE 10 MG: 10 TABLET ORAL at 05:21

## 2023-06-15 RX ADMIN — ACETAMINOPHEN 650 MG: 325 TABLET, FILM COATED ORAL at 13:39

## 2023-06-15 RX ADMIN — SENNOSIDES AND DOCUSATE SODIUM 2 TABLET: 50; 8.6 TABLET ORAL at 21:41

## 2023-06-15 NOTE — CONSULTS
Vancomycin has been discontinued  Thank you for allowing pharmacy to be part of this patient's care via the pharmacy general consult service  Please do not hesitate to reach out to pharmacy with any questions or concerns  Pharmacy is signing off

## 2023-06-15 NOTE — ASSESSMENT & PLAN NOTE
Lab Results   Component Value Date    EGFR 59 06/16/2023    EGFR 47 06/15/2023    EGFR 57 06/14/2023    CREATININE 1 23 06/16/2023    CREATININE 1 49 (H) 06/15/2023    CREATININE 1 27 06/14/2023   · baseline creat 1 0-1 2- improved today from 1 49  · Noted to be retaining urine yesterday, s/p 1x straight cath and since then the patient has been voiding without difficulty     · C/w retention protocol   · s/p 1L of NSS bolus yesterday   · Check BMP in am   · C/w  Holding losartan   · Avoid nephrotoxic drugs and hypotension

## 2023-06-15 NOTE — PROGRESS NOTES
Progress Note - Infectious Disease   Caesar Lomas 69 y o  male MRN: [id number]  Unit/Bed#: 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, prosthetic joint infection   Status post left reverse total shoulder arthroplasty 4/4/2023  The patient presents with recurrent infection and purulent drainage from the left shoulder   CT of the shoulder shows a fluid collection likely communicating to the joint space  Cultures from I&D 5/2 grew coagulase-negative staph and alphahemolytic strep in broth only, as well as growth of cutibacterium acnes and avidum   The patient is hemodynamically stable  Status post left shoulder resection arthroplasty with insertion of antibiotic spacer 6/13/2023 for prosthetic joint infection  Tissue culture from admission is now growing Cutibacterium Avidum  Prior isolate was susceptible to penicillin              -continue IV ceftriaxone 2 g every 24 hours for now              -Follow-up pending cultures, micro lab has been asked to hold all cultures for 14 days and susceptibilities will be performed on the cutibacterium              -Orthopedic surgery following   -If no other organisms isolated in culture, plan to transition tomorrow to IV penicillin G 3 million units q4 hours inpatient (20 million units continuous infusion outpatient)              -Recommend a 6-week course of IV antibiotics for prosthetic joint infection following antibiotic spacer placement, through 7/25/23              -Monitor white blood cell count, fever curve     2   Type 2 diabetes mellitus, poorly controlled   Hemoglobin A1c 8 4%   This is a risk factor for recurrent infection               -Glycemic management per primary team     3   Obesity   Risk factor for recurrent infection      Plan discussed with the primary team, patient and his wife  ID will follow  Antibiotics:  Ceftriaxone day 4    Subjective:   The patient is having pain in the left shoulder today  Otherwise he is doing well, denies fever, chills, nausea, diarrhea  Tolerating antibiotics without issue  Objective:  Vitals:  Temp:  [97 5 °F (36 4 °C)-98 9 °F (37 2 °C)] 97 8 °F (36 6 °C)  HR:  [65-88] 65  Resp:  [16-20] 16  BP: (110-115)/(55-61) 113/55  SpO2:  [94 %-98 %] 97 %  Temp (24hrs), Av 4 °F (36 9 °C), Min:97 5 °F (36 4 °C), Max:98 9 °F (37 2 °C)  Current: Temperature: 97 8 °F (36 6 °C)    Physical Exam:   General: no acute distress, nontoxic   Head: normocephalic, atraumatic  CV: RRR, no murmurs   Lungs: clear to auscultation bilaterally   Abdomen: no distension or tenderness to palpation   Extremities: Left shoulder wrapped, arm in sling  Skin: no rashes  Neuro: awake and alert, moving all extremities spontaneously   Psych: calm, cooperative     Labs:    All pertinent labs and imaging studies were personally reviewed  Results from last 7 days   Lab Units 06/15/23  0525 23  0544 23  2215   HEMOGLOBIN g/dL 10 3* 12 0 11 8*   PLATELETS Thousands/uL 103* 109* 90*   WBC Thousand/uL 5 82 5 04 6 68     Results from last 7 days   Lab Units 06/15/23  0525 23  0544 23  0518 23  0459   ALK PHOS U/L  --   --   --  162*   ALT U/L  --   --   --  41   AST U/L  --   --   --  38   BUN mg/dL 33* 26* 21 25   CALCIUM mg/dL 8 9 9 3 9 4 9 2   CHLORIDE mmol/L 105 105 110* 112*   CO2 mmol/L 24 23 24 22   CREATININE mg/dL 1 49* 1 27 1 01 1 04   EGFR ml/min/1 73sq m 47 57 75 72   POTASSIUM mmol/L 3 9 4 3 3 7 4 0   SODIUM mmol/L 134* 134* 138 137         Results from last 7 days   Lab Units 23  1438   CRP mg/L 27 0*               Micro:  Results from last 7 days   Lab Units 23  1712 23  1708 23  1707 23  1651 23  1650 23  1649 23  1701   BODY FLUID CULTURE, STERILE   --   --   --   --   --   --  1+ Growth of Cutibacterium avidum*   GRAM STAIN RESULT  2+ Polys  No Polys or Bacteria seen No Polys or Bacteria seen Rare Disintegrating polys  No bacteria seen 2+ Polys 1+ Polys  No bacteria seen  1+ Mononuclear Cells  No bacteria seen No Polys or Bacteria seen 3+ Polys*  1+ Gram variable rods*     Wound cx 6/11- Cutibacterium avidum      Imaging:  I have personally reviewed pertinent imaging reports and images in PACS       CT left shoulder: fluid collection anterior aspect of left shoulder, likely communicating with the glenohumeral joint

## 2023-06-15 NOTE — ASSESSMENT & PLAN NOTE
Patient here with hx of left shoulder replacement 0/3307 complicated by site wound infection requiring admission from May 1 to May 5 status post I&D with Ortho 5/2  Evaluated by ID at this time  Presented back to the ED 6/11 with noted swelling and erythema of the surgical wound site, aspiration performed, cultures from 6/11 growing Cutibacterium avidum  · CT LUE without contrast shows Localized fluid collection in the anterior aspect of left shoulder measuring 5 8 x 4 4 x 5 1 cm, which probably communicates with the glenohumeral joint  There are small foci of air within the collection, which may be related to superinfection or sequela of prior I&D procedure  · Ortho following, s/p left shoulder resection arthroplasty and insertion of antibiotic spacer 6/13  · Hemovac in place  · ID following, continue CTX- likely transition to PCN in the next 24 hours   · Operative cultures are pending  · Will need 6 week course IV abx, placed PICC  · NWB LUE in sling  · Pain control  · DVT ppx  · PT/OT, CM consult for d/c needs

## 2023-06-15 NOTE — ASSESSMENT & PLAN NOTE
Lab Results   Component Value Date    HGBA1C 8 4 (H) 03/20/2023       Recent Labs     06/15/23  1652 06/15/23  2105 06/16/23  0645 06/16/23  1155   POCGLU 335* 304* 204* 276*     Home regimen on Glargine 40 units HS + Glimepiride 5 mg BID   · Increase lantus to 25 units QHS   · continue SSI and add 5-5-5 with meals   · Diabetic diet  · Monitor accuchecks and adjust regimen PRN

## 2023-06-15 NOTE — ASSESSMENT & PLAN NOTE
· Detected on exam  · ECHO showing EF 60%, G1DD, LA and RA dilated, AV with mild stenosis and MV with moderate calcification   · Recommend outpatient f/u with cards after discharge and yearly echos to monitor

## 2023-06-15 NOTE — PROGRESS NOTES
Progress Note - Orthopedics   Ma Oar 69 y o  male MRN: [id number]  Unit/Bed#: [facility] 625-01      Subjective:    69 y o male POD 2 left shoulder resection arthroplasty, insertion of antibiotic spacer  No acute events, no new complaints  Patient doing well  Pain well controlled  Patient got PICC line yesterday      Labs:  0   Lab Value Date/Time    CRP 27 0 (H) 06/11/2023 1438    ESR 85 (H) 06/11/2023 1438    HCT 37 0 06/14/2023 0544    HCT 38 3 06/13/2023 2215    HCT 38 0 06/13/2023 0518    HGB 12 0 06/14/2023 0544    HGB 11 8 (L) 06/13/2023 2215    HGB 12 3 06/13/2023 0518    INR 1 08 06/12/2023 1703    WBC 5 04 06/14/2023 0544    WBC 6 68 06/13/2023 2215    WBC 2 72 (L) 06/13/2023 0518       Meds:    Current Facility-Administered Medications:   •  acetaminophen (TYLENOL) tablet 650 mg, 650 mg, Oral, Q8H [facility], AMOR Shook, 650 mg at 06/15/23 1881  •  cefTRIAXone (ROCEPHIN) 2,000 mg in dextrose 5 % 50 mL IVPB, 2,000 mg, Intravenous, Q24H, Dain Roth MD, Stopped at 06/14/23 1840  •  diltiazem (CARDIZEM CD) 24 hr capsule 180 mg, 180 mg, Oral, Daily, Dain Roth MD, 180 mg at 06/14/23 0801  •  enoxaparin (LOVENOX) subcutaneous injection 40 mg, 40 mg, Subcutaneous, Q24H [facility], AMOR Cobian, 40 mg at 06/14/23 1237  •  HYDROmorphone (DILAUDID) injection 0 5 mg, 0 5 mg, Intravenous, Q6H PRN, Dain Roth MD, 0 5 mg at 06/14/23 0353  •  insulin glargine (LANTUS) subcutaneous injection 20 Units 0 2 mL, 20 Units, Subcutaneous, HS, AMOR Cobian, 20 Units at 06/14/23 2222  •  insulin lispro (HumaLOG) 100 units/mL subcutaneous injection 2-12 Units, 2-12 Units, Subcutaneous, 4x Daily (AC & HS), 6 Units at 06/14/23 2222 **AND** Fingerstick Glucose (POCT), , , 4x Daily AC and at bedtime, Alex Tavera PA-C  •  oxyCODONE (ROXICODONE) immediate release tablet 10 mg, 10 mg, Oral, Q6H PRN, Dain Roth MD, 10 mg at 06/15/23 0521  •  oxyCODONE (ROXICODONE) IR tablet 5 mg, 5 mg, Oral, Q6H PRN, Jumana Lee MD  •  polyethylene glycol (MIRALAX) packet 17 g, 17 g, Oral, Daily PRN, Jumana Lee MD, 17 g at 06/12/23 7075  •  senna-docusate sodium (SENOKOT S) 8 6-50 mg per tablet 1 tablet, 1 tablet, Oral, HS, Jumana Lee MD, 1 tablet at 06/14/23 2222  •  traZODone (DESYREL) tablet 50 mg, 50 mg, Oral, HS, Jumana Lee MD, 50 mg at 06/14/23 2222    Blood Culture:   Lab Results   Component Value Date    BLOODCX No Growth After 5 Days  09/13/2022    BLOODCX No Growth After 5 Days  09/13/2022       Wound Culture:   Lab Results   Component Value Date    WOUNDCULT 1+ Growth of Klebsiella oxytoca (A) 09/14/2022    WOUNDCULT 1+ Growth of 09/14/2022       Ins and Outs:  I/O last 24 hours: In: 18 [P O :940; IV Piggyback:50]  Out: 70 [Drains:70]          Physical:  Vitals:    06/15/23 0155   BP: 113/61   Pulse: 88   Resp: 18   Temp: 98 7 °F (37 1 °C)   SpO2: 94%     Musculoskeletal: left Upper Extremity  · Dressing c/d/i  · HV drain in place -   · TTP celina-incisionally  · Sensation intact to median/radial/ulnar nerve distribution   · Motor intact anterior interosseous nerve/posterior interosseous nerve/median/radial/ulnar nerve distributions  · 2+ radial pulse, symmetric bilaterally  · Digits warm and well perfused  · Capillary refill < 2 seconds    Assessment:    67 y o male POD 2 L shoulder resection arthroplasty, insertion of antibiotic spacer  Patient doing well       Plan:  · NWB LUE  · Long term antibiotics per ID, pending final cultures  · Will monitor for ABLA and administer IVF/prbc as indicated for Greater than 2 gram drop or Hgb < 7  · PT/OT  · Pain control  · DVT ppx - per primary  · Dispo: Ortho will follow    Srini Willingham MD

## 2023-06-15 NOTE — ASSESSMENT & PLAN NOTE
BP acceptable   Home regimen: Cardizem 180 mg QD and Losartan 100 mg QD   · Can continue Cardizem and holding losartan   · Monitor closely

## 2023-06-15 NOTE — PROGRESS NOTES
[address] [facility]  Progress Note  Name: Amberly Alfaro  MRN: [id number]  Unit/Bed#: [facility] 238-90 I Date of Admission: 6/11/2023   Date of Service: 6/15/2023 I Hospital Day: 4    Assessment/Plan   * Postoperative cellulitis of surgical wound  Assessment & Plan  Patient here with hx of left shoulder replacement 5/2139 complicated by site wound infection requiring admission from May 1 to May 5 status post I&D with Ortho 5/2  Evaluated by ID at this time  Presented back to the ED 6/11 with noted swelling and erythema of the surgical wound site, aspiration performed, cultures from 6/11 growing Cutibacterium avidum  · CT LUE without contrast shows Localized fluid collection in the anterior aspect of left shoulder measuring 5 8 x 4 4 x 5 1 cm, which probably communicates with the glenohumeral joint  There are small foci of air within the collection, which may be related to superinfection or sequela of prior I&D procedure  · Ortho following, s/p left shoulder resection arthroplasty and insertion of antibiotic spacer 6/13  · Hemovac in place  · ID following, continue CTX- likely transition to PCN   · Operative cultures are pending  · Will need 6 week course IV abx, placed PICC  · NWB LUE in sling  · Pain control  · DVT ppx  · PT/OT, CM consult for d/c needs  Acute kidney injury superimposed on chronic kidney disease [facility])  Assessment & Plan  Lab Results   Component Value Date    CREATININE 1 49 (H) 06/15/2023    CREATININE 1 27 06/14/2023    CREATININE 1 01 06/13/2023    EGFR 47 06/15/2023    EGFR 57 06/14/2023    EGFR 75 06/13/2023   · baseline creat 1 0-1 2  · Creat today 1 49  · Check PVR and follow retention protocol  · Give 1L of NSS   · Check BMP in am   · C/w  Holding losartan   · Avoid nephrotoxic drugs and hypotension     Murmur  Assessment & Plan  · Detected on exam  · Check echo     Obesity (BMI 30 0-34  9)  Assessment & Plan  · BMI 34 87  · Affecting all facets of life    Insomnia  Assessment & Plan  · Continue home med Trazodone 50 mg HS     MEG (obstructive sleep apnea)  Assessment & Plan  · Continue HS CPAP     Essential hypertension  Assessment & Plan  BP acceptable  Home regimen: Cardizem 180 mg QD and Losartan 100 mg QD   · Can continue Cardizem and holding losartan   · Monitor closely     Type 2 diabetes mellitus [facility])  Assessment & Plan  Lab Results   Component Value Date    HGBA1C 8 4 (H) 03/20/2023       Recent Labs     06/14/23  1709 06/14/23  2041 06/15/23  0741 06/15/23  1118   POCGLU 282* 289* 187* 282*     Home regimen on Glargine 40 units HS + Glimepiride 5 mg BID   · Increased lantus to 20 units QHS 6/14- monitor with increase  · continue SSI  · Diabetic diet  · Monitor accuchecks and adjust regimen PRN               VTE Pharmacologic Prophylaxis: VTE Score: 3 Moderate Risk (Score 3-4) - Pharmacological DVT Prophylaxis Ordered: enoxaparin (Lovenox)  Patient Centered Rounds: I performed bedside rounds with nursing staff today  Discussions with Specialists or Other Care Team Provider: d/w RN d/w Dr Carroll [facility]    Education and Discussions with Family / Patient: Updated  (wife) at bedside  Total Time Spent on Date of Encounter in care of patient: 35 minutes This time was spent on one or more of the following: performing physical exam; counseling and coordination of care; obtaining or reviewing history; documenting in the medical record; reviewing/ordering tests, medications or procedures; communicating with other healthcare professionals and discussing with patient's family/caregivers  Current Length of Stay: 4 day(s)  Current Patient Status: Inpatient   Certification Statement: The patient will continue to require additional inpatient hospital stay due to pending cultures   Discharge Plan: Anticipate discharge in 24-48 hrs to home with home services  Code Status: Level 1 - Full Code    Subjective:   pt sitting oob in the chair   Reports "feeling \"cranky  \" reports pain currently a 5/10 in shoulder but can be a 10/10 with movement  Denies any other complaints  Objective:     Vitals:   Temp (24hrs), Av 5 °F (36 9 °C), Min:97 5 °F (36 4 °C), Max:98 9 °F (37 2 °C)    Temp:  [97 5 °F (36 4 °C)-98 9 °F (37 2 °C)] 98 6 °F (37 °C)  HR:  [74-88] 82  Resp:  [16-20] 20  BP: (110-119)/(42-61) 115/56  SpO2:  [94 %-98 %] 98 %  Body mass index is 34 87 kg/m²  Input and Output Summary (last 24 hours): Intake/Output Summary (Last 24 hours) at 6/15/2023 1439  Last data filed at 6/15/2023 1345  Gross per 24 hour   Intake 950 ml   Output 65 ml   Net 885 ml       Physical Exam:   Physical Exam  Constitutional:       General: He is not in acute distress  Appearance: He is obese  He is not ill-appearing  Cardiovascular:      Rate and Rhythm: Normal rate and regular rhythm  Pulses: Normal pulses  Heart sounds: Murmur heard  Pulmonary:      Effort: No respiratory distress  Breath sounds: Normal breath sounds  No wheezing or rales  Abdominal:      General: Bowel sounds are normal  There is no distension  Palpations: Abdomen is soft  Tenderness: There is no abdominal tenderness  Musculoskeletal:         General: Tenderness (left shoulder with dry dressing intact, pressure dressind and sling ) present  Skin:     General: Skin is warm and dry  Findings: No erythema or rash  Neurological:      General: No focal deficit present  Mental Status: He is alert  Mental status is at baseline  Sensory: No sensory deficit     Psychiatric:         Attention and Perception: Attention normal          Mood and Affect: Mood normal           Additional Data:     Labs:  Results from last 7 days   Lab Units 06/15/23  0525   EOS PCT % 6   HEMATOCRIT % 31 9*   HEMOGLOBIN g/dL 10 3*   LYMPHS PCT % 19   MONOS PCT % 13*   NEUTROS PCT % 61   PLATELETS Thousands/uL 103*   WBC Thousand/uL 5 82     Results from last 7 days   Lab Units " 06/15/23  0525 06/13/23  0518 06/12/23  0459   ANION GAP mmol/L 5   < > 3*   ALBUMIN g/dL  --   --  2 9*   ALK PHOS U/L  --   --  162*   ALT U/L  --   --  41   AST U/L  --   --  38   BUN mg/dL 33*   < > 25   CALCIUM mg/dL 8 9   < > 9 2   CHLORIDE mmol/L 105   < > 112*   CO2 mmol/L 24   < > 22   CREATININE mg/dL 1 49*   < > 1 04   GLUCOSE RANDOM mg/dL 176*   < > 83   POTASSIUM mmol/L 3 9   < > 4 0   SODIUM mmol/L 134*   < > 137   TOTAL BILIRUBIN mg/dL  --   --  0 84    < > = values in this interval not displayed       Results from last 7 days   Lab Units 06/12/23  1703   INR  1 08     Results from last 7 days   Lab Units 06/15/23  1118 06/15/23  0741 06/14/23  2041 06/14/23  1709 06/14/23  1229 06/14/23  0743 06/13/23  2124 06/13/23  1917 06/13/23  1906 06/13/23  1319 06/13/23  0530 06/12/23  2341   POC GLUCOSE mg/dl 282* 187* 289* 282* 293* 228* 160* 112 114 158* 106 164*               Lines/Drains:  Invasive Devices     Peripherally Inserted Central Catheter Line  Duration           PICC Line 41/14/80 Right Basilic 1 day          Drain  Duration           Closed/Suction Drain Left Shoulder Accordion 10 Fr  1 day                Central Line:  Goal for removal: N/A - Discharging with PICC for IV ABX/medications             Imaging: Reviewed radiology reports from this admission including: xray(s) and CT    Recent Cultures (last 7 days):   Results from last 7 days   Lab Units 06/13/23  1712 06/13/23  1708 06/13/23  1707 06/13/23  1651 06/13/23  1650 06/13/23  1649 06/11/23  1701   BODY FLUID CULTURE, STERILE   --   --   --   --   --   --  1+ Growth of Cutibacterium avidum*   GRAM STAIN RESULT  2+ Polys  No Polys or Bacteria seen No Polys or Bacteria seen Rare Disintegrating polys  No bacteria seen 2+ Polys 1+ Polys  No bacteria seen  1+ Mononuclear Cells  No bacteria seen No Polys or Bacteria seen 3+ Polys*  1+ Gram variable rods*       Last 24 Hours Medication List:   Current Facility-Administered Medications Medication Dose Route Frequency Provider Last Rate   • acetaminophen  650 mg Oral [facility] AMOR Vu     • cefTRIAXone  2,000 mg Intravenous Q24H Jovita Fonseca MD Stopped (06/14/23 1840)   • diltiazem  180 mg Oral Daily Jovita Fonseca MD     • enoxaparin  40 mg Subcutaneous Q24H [facility] AMOR Cobian     • HYDROmorphone  0 5 mg Intravenous Q6H PRN Jovita Fonseca MD     • insulin glargine  20 Units Subcutaneous HS AMOR Cobian     • insulin lispro  2-12 Units Subcutaneous 4x Daily (AC & HS) Arabella Ch PA-C     • oxyCODONE  10 mg Oral Q6H PRN Jovita Fonseca MD     • oxyCODONE  5 mg Oral Q6H PRN Jovita Fonseca MD     • polyethylene glycol  17 g Oral Daily PRN Jovita Fonseca MD     • senna-docusate sodium  2 tablet Oral HS AMOR Mcbride     • sodium chloride  1,000 mL Intravenous Once AOMR Bajwa     • traZODone  50 mg Oral HS Jovita Fonseca MD          Today, Patient Was Seen By: AMOR Bajwa    **Please Note: This note may have been constructed using a voice recognition system  **

## 2023-06-15 NOTE — ASSESSMENT & PLAN NOTE
Lab Results   Component Value Date    CREATININE 1 49 (H) 06/15/2023    CREATININE 1 27 06/14/2023    CREATININE 1 01 06/13/2023    EGFR 47 06/15/2023    EGFR 57 06/14/2023    EGFR 75 06/13/2023   · baseline creat 1 0-1 2  · Creat today 1 49  · Check PVR and follow retention protocol  · Give 1L of NSS   · Check BMP in am   · C/w  Holding losartan   · Avoid nephrotoxic drugs and hypotension

## 2023-06-15 NOTE — ASSESSMENT & PLAN NOTE
Lab Results   Component Value Date    HGBA1C 8 4 (H) 03/20/2023       Recent Labs     06/14/23  1709 06/14/23  2041 06/15/23  0741 06/15/23  1118   POCGLU 282* 289* 187* 282*     Home regimen on Glargine 40 units HS + Glimepiride 5 mg BID   · Increased lantus to 20 units QHS 6/14- monitor with increase  · continue SSI  · Diabetic diet  · Monitor accuchecks and adjust regimen PRN

## 2023-06-15 NOTE — PLAN OF CARE
Problem: PAIN - ADULT  Goal: Verbalizes/displays adequate comfort level or baseline comfort level  Description: Interventions:  - Encourage patient to monitor pain and request assistance  - Assess pain using appropriate pain scale  - Administer analgesics based on type and severity of pain and evaluate response  - Implement non-pharmacological measures as appropriate and evaluate response  - Consider cultural and social influences on pain and pain management  - Notify physician/advanced practitioner if interventions unsuccessful or patient reports new pain  Outcome: Progressing     Problem: INFECTION - ADULT  Goal: Absence or prevention of progression during hospitalization  Description: INTERVENTIONS:  - Assess and monitor for signs and symptoms of infection  - Monitor lab/diagnostic results  - Monitor all insertion sites, i e  indwelling lines, tubes, and drains  - Monitor endotracheal if appropriate and nasal secretions for changes in amount and color  - [place] appropriate cooling/warming therapies per order  - Administer medications as ordered  - Instruct and encourage patient and family to use good hand hygiene technique  - Identify and instruct in appropriate isolation precautions for identified infection/condition  Outcome: Progressing  Goal: Absence of fever/infection during neutropenic period  Description: INTERVENTIONS:  - Monitor WBC    Outcome: Progressing     Problem: SAFETY ADULT  Goal: Patient will remain free of falls  Description: INTERVENTIONS:  - Educate patient/family on patient safety including physical limitations  - Instruct patient to call for assistance with activity   - Consult OT/PT to assist with strengthening/mobility   - Keep Call bell within reach  - Keep bed low and locked with side rails adjusted as appropriate  - Keep care items and personal belongings within reach  - Initiate and maintain comfort rounds  - Make Fall Risk Sign visible to staff  - Offer Toileting every  Hours, in advance of need  - Initiate/Maintain alarm  - Obtain necessary fall risk management equipment:   - Apply yellow socks and bracelet for high fall risk patients  - Consider moving patient to room near nurses station  Outcome: Progressing  Goal: Maintain or return to baseline ADL function  Description: INTERVENTIONS:  -  Assess patient's ability to carry out ADLs; assess patient's baseline for ADL function and identify physical deficits which impact ability to perform ADLs (bathing, care of mouth/teeth, toileting, grooming, dressing, etc )  - Assess/evaluate cause of self-care deficits   - Assess range of motion  - Assess patient's mobility; develop plan if impaired  - Assess patient's need for assistive devices and provide as appropriate  - Encourage maximum independence but intervene and supervise when necessary  - Involve family in performance of ADLs  - Assess for home care needs following discharge   - Consider OT consult to assist with ADL evaluation and planning for discharge  - Provide patient education as appropriate  Outcome: Progressing  Goal: Maintains/Returns to pre admission functional level  Description: INTERVENTIONS:  - Perform BMAT or MOVE assessment daily    - Set and communicate daily mobility goal to care team and patient/family/caregiver  - Collaborate with rehabilitation services on mobility goals if consulted  - Perform Range of Motion  times a day  - Reposition patient every  hours    - Dangle patient  times a day  - Stand patient  times a day  - Ambulate patient  times a day  - Out of bed to chair  times a day   - Out of bed for meal times a day  - Out of bed for toileting  - Record patient progress and toleration of activity level   Outcome: Progressing     Problem: DISCHARGE PLANNING  Goal: Discharge to home or other facility with appropriate resources  Description: INTERVENTIONS:  - Identify barriers to discharge w/patient and caregiver  - Arrange for needed discharge resources and transportation as appropriate  - Identify discharge learning needs (meds, wound care, etc )  - Arrange for interpretive services to assist at discharge as needed  - Refer to Case Management Department for coordinating discharge planning if the patient needs post-hospital services based on physician/advanced practitioner order or complex needs related to functional status, cognitive ability, or social support system  Outcome: Progressing     Problem: Knowledge Deficit  Goal: Patient/family/caregiver demonstrates understanding of disease process, treatment plan, medications, and discharge instructions  Description: Complete learning assessment and assess knowledge base    Interventions:  - Provide teaching at level of understanding  - Provide teaching via preferred learning methods  Outcome: Progressing

## 2023-06-15 NOTE — ASSESSMENT & PLAN NOTE
Patient here with hx of left shoulder replacement 4/4390 complicated by site wound infection requiring admission from May 1 to May 5 status post I&D with Ortho 5/2  Evaluated by ID at this time  Presented back to the ED 6/11 with noted swelling and erythema of the surgical wound site, aspiration performed, cultures from 6/11 growing Cutibacterium avidum  · CT LUE without contrast shows Localized fluid collection in the anterior aspect of left shoulder measuring 5 8 x 4 4 x 5 1 cm, which probably communicates with the glenohumeral joint  There are small foci of air within the collection, which may be related to superinfection or sequela of prior I&D procedure  · Ortho following, s/p left shoulder resection arthroplasty and insertion of antibiotic spacer 6/13  · Hemovac in place  · ID following, continue CTX- likely transition to PCN   · Operative cultures are pending  · Will need 6 week course IV abx, placed PICC  · NWB LUE in sling  · Pain control  · DVT ppx  · PT/OT, CM consult for d/c needs

## 2023-06-15 NOTE — PLAN OF CARE
Problem: PAIN - ADULT  Goal: Verbalizes/displays adequate comfort level or baseline comfort level  Description: Interventions:  - Encourage patient to monitor pain and request assistance  - Assess pain using appropriate pain scale  - Administer analgesics based on type and severity of pain and evaluate response  - Implement non-pharmacological measures as appropriate and evaluate response  - Consider cultural and social influences on pain and pain management  - Notify physician/advanced practitioner if interventions unsuccessful or patient reports new pain  Outcome: Progressing     Problem: INFECTION - ADULT  Goal: Absence or prevention of progression during hospitalization  Description: INTERVENTIONS:  - Assess and monitor for signs and symptoms of infection  - Monitor lab/diagnostic results  - Monitor all insertion sites, i e  indwelling lines, tubes, and drains  - Monitor endotracheal if appropriate and nasal secretions for changes in amount and color  - [place] appropriate cooling/warming therapies per order  - Administer medications as ordered  - Instruct and encourage patient and family to use good hand hygiene technique  - Identify and instruct in appropriate isolation precautions for identified infection/condition  Outcome: Progressing  Goal: Absence of fever/infection during neutropenic period  Description: INTERVENTIONS:  - Monitor WBC    Outcome: Progressing     Problem: SAFETY ADULT  Goal: Patient will remain free of falls  Description: INTERVENTIONS:  - Educate patient/family on patient safety including physical limitations  - Instruct patient to call for assistance with activity   - Consult OT/PT to assist with strengthening/mobility   - Keep Call bell within reach  - Keep bed low and locked with side rails adjusted as appropriate  - Keep care items and personal belongings within reach  - Initiate and maintain comfort rounds  - Make Fall Risk Sign visible to staff  - Offer Toileting every 2 Hours, in advance of need  - Initiate/Maintain on alarm  - Obtain necessary fall risk management equipment:   - Apply yellow socks and bracelet for high fall risk patients  - Consider moving patient to room near nurses station  Outcome: Progressing  Goal: Maintain or return to baseline ADL function  Description: INTERVENTIONS:  -  Assess patient's ability to carry out ADLs; assess patient's baseline for ADL function and identify physical deficits which impact ability to perform ADLs (bathing, care of mouth/teeth, toileting, grooming, dressing, etc )  - Assess/evaluate cause of self-care deficits   - Assess range of motion  - Assess patient's mobility; develop plan if impaired  - Assess patient's need for assistive devices and provide as appropriate  - Encourage maximum independence but intervene and supervise when necessary  - Involve family in performance of ADLs  - Assess for home care needs following discharge   - Consider OT consult to assist with ADL evaluation and planning for discharge  - Provide patient education as appropriate  Outcome: Progressing  Goal: Maintains/Returns to pre admission functional level  Description: INTERVENTIONS:  - Perform BMAT or MOVE assessment daily    - Set and communicate daily mobility goal to care team and patient/family/caregiver  - Collaborate with rehabilitation services on mobility goals if consulted  - Perform Range of Motion 3 times a day  - Reposition patient every 2 hours    - Dangle patient 3 times a day  - Stand patient 3 times a day  - Ambulate patient 3 times a day  - Out of bed to chair 3 times a day   - Out of bed for meals 3  Problem: DISCHARGE PLANNING  Goal: Discharge to home or other facility with appropriate resources  Description: INTERVENTIONS:  - Identify barriers to discharge w/patient and caregiver  - Arrange for needed discharge resources and transportation as appropriate  - Identify discharge learning needs (meds, wound care, etc )  - Arrange for interpretive services to assist at discharge as needed  - Refer to Case Management Department for coordinating discharge planning if the patient needs post-hospital services based on physician/advanced practitioner order or complex needs related to functional status, cognitive ability, or social support system  Outcome: Progressing     Problem: Knowledge Deficit  Goal: Patient/family/caregiver demonstrates understanding of disease process, treatment plan, medications, and discharge instructions  Description: Complete learning assessment and assess knowledge base    Interventions:  - Provide teaching at level of understanding  - Provide teaching via preferred learning methods  Outcome: Progressing    times a day  - Out of bed for toileting  - Record patient progress and toleration of activity level   Outcome: Progressing

## 2023-06-15 NOTE — ASSESSMENT & PLAN NOTE
-- Message is from the [facility]--    Reason for Call: patient is calling she would like to know if Dr. Mis Alegria has put in a order for a cholestorol  Test. Patient MRN:[id number] Britney Angela-Nabeel.     Caller Information       Type Contact Phone    12/17/2018 10:08 AM Phone (Incoming) Didi MASTERSON (Self) [phone] (H)          Alternative phone number: [phone]    Turnaround time given to caller:   \"This message will be sent to [state Provider's name]. The clinical team will fulfill your request as soon as they review your message.\"     · Continue HS CPAP

## 2023-06-16 ENCOUNTER — APPOINTMENT (INPATIENT)
Dept: NON INVASIVE DIAGNOSTICS | Facility: HOSPITAL | Age: 68
DRG: 496 | End: 2023-06-16
Payer: MEDICARE

## 2023-06-16 ENCOUNTER — HOME HEALTH ADMISSION (OUTPATIENT)
Dept: HOME HEALTH SERVICES | Facility: HOME HEALTHCARE | Age: 68
End: 2023-06-16

## 2023-06-16 LAB
ANION GAP SERPL CALCULATED.3IONS-SCNC: 6 MMOL/L (ref 4–13)
AORTIC ROOT: 3.5 CM
AORTIC VALVE MEAN VELOCITY: 15.6 M/S
APICAL FOUR CHAMBER EJECTION FRACTION: 63 %
ASCENDING AORTA: 3.6 CM
AV AREA BY CONTINUOUS VTI: 2.5 CM2
AV AREA PEAK VELOCITY: 2.1 CM2
AV LVOT MEAN GRADIENT: 5 MMHG
AV LVOT PEAK GRADIENT: 8 MMHG
AV MEAN GRADIENT: 12 MMHG
AV PEAK GRADIENT: 22 MMHG
AV VALVE AREA: 2.53 CM2
AV VELOCITY RATIO: 0.59
BACTERIA SPEC ANAEROBE CULT: NO GROWTH
BACTERIA TISS AEROBE CULT: NO GROWTH
BACTERIA TISS AEROBE CULT: NO GROWTH
BASOPHILS # BLD AUTO: 0.03 THOUSANDS/ÂΜL (ref 0–0.1)
BASOPHILS NFR BLD AUTO: 1 % (ref 0–1)
BUN SERPL-MCNC: 30 MG/DL (ref 5–25)
CALCIUM SERPL-MCNC: 9 MG/DL (ref 8.3–10.1)
CHLORIDE SERPL-SCNC: 104 MMOL/L (ref 96–108)
CO2 SERPL-SCNC: 23 MMOL/L (ref 21–32)
CREAT SERPL-MCNC: 1.23 MG/DL (ref 0.6–1.3)
DOP CALC AO PEAK VEL: 2.34 M/S
DOP CALC AO VTI: 43.07 CM
DOP CALC LVOT AREA: 3.46 CM2
DOP CALC LVOT DIAMETER: 2.1 CM
DOP CALC LVOT PEAK VEL VTI: 31.48 CM
DOP CALC LVOT PEAK VEL: 1.39 M/S
DOP CALC LVOT STROKE INDEX: 47.7 ML/M2
DOP CALC LVOT STROKE VOLUME: 108.98 CM3
E WAVE DECELERATION TIME: 282 MS
EOSINOPHIL # BLD AUTO: 0.27 THOUSAND/ÂΜL (ref 0–0.61)
EOSINOPHIL NFR BLD AUTO: 6 % (ref 0–6)
ERYTHROCYTE [DISTWIDTH] IN BLOOD BY AUTOMATED COUNT: 15 % (ref 11.6–15.1)
FRACTIONAL SHORTENING: 37 % (ref 28–44)
GFR SERPL CREATININE-BSD FRML MDRD: 59 ML/MIN/1.73SQ M
GLUCOSE SERPL-MCNC: 177 MG/DL (ref 65–140)
GLUCOSE SERPL-MCNC: 204 MG/DL (ref 65–140)
GLUCOSE SERPL-MCNC: 256 MG/DL (ref 65–140)
GLUCOSE SERPL-MCNC: 271 MG/DL (ref 65–140)
GLUCOSE SERPL-MCNC: 276 MG/DL (ref 65–140)
GRAM STN SPEC: NORMAL
HCT VFR BLD AUTO: 30.9 % (ref 36.5–49.3)
HGB BLD-MCNC: 9.6 G/DL (ref 12–17)
IMM GRANULOCYTES # BLD AUTO: 0.01 THOUSAND/UL (ref 0–0.2)
IMM GRANULOCYTES NFR BLD AUTO: 0 % (ref 0–2)
INTERVENTRICULAR SEPTUM IN DIASTOLE (PARASTERNAL SHORT AXIS VIEW): 1 CM
INTERVENTRICULAR SEPTUM: 1 CM (ref 0.6–1.1)
LAAS-AP2: 29.7 CM2
LAAS-AP4: 28.5 CM2
LEFT ATRIUM SIZE: 4.4 CM
LEFT INTERNAL DIMENSION IN SYSTOLE: 3.1 CM (ref 2.1–4)
LEFT VENTRICULAR INTERNAL DIMENSION IN DIASTOLE: 4.9 CM (ref 3.5–6)
LEFT VENTRICULAR POSTERIOR WALL IN END DIASTOLE: 1.1 CM
LEFT VENTRICULAR STROKE VOLUME: 74 ML
LVSV (TEICH): 74 ML
LYMPHOCYTES # BLD AUTO: 0.66 THOUSANDS/ÂΜL (ref 0.6–4.47)
LYMPHOCYTES NFR BLD AUTO: 15 % (ref 14–44)
MCH RBC QN AUTO: 27 PG (ref 26.8–34.3)
MCHC RBC AUTO-ENTMCNC: 31.1 G/DL (ref 31.4–37.4)
MCV RBC AUTO: 87 FL (ref 82–98)
MONOCYTES # BLD AUTO: 0.5 THOUSAND/ÂΜL (ref 0.17–1.22)
MONOCYTES NFR BLD AUTO: 11 % (ref 4–12)
MV E'TISSUE VEL-SEP: 9 CM/S
MV PEAK A VEL: 1.49 M/S
MV PEAK E VEL: 117 CM/S
MV STENOSIS PRESSURE HALF TIME: 82 MS
MV VALVE AREA P 1/2 METHOD: 2.68 CM2
NEUTROPHILS # BLD AUTO: 2.99 THOUSANDS/ÂΜL (ref 1.85–7.62)
NEUTS SEG NFR BLD AUTO: 67 % (ref 43–75)
NRBC BLD AUTO-RTO: 0 /100 WBCS
PLATELET # BLD AUTO: 85 THOUSANDS/UL (ref 149–390)
PMV BLD AUTO: 12.8 FL (ref 8.9–12.7)
POTASSIUM SERPL-SCNC: 3.9 MMOL/L (ref 3.5–5.3)
RBC # BLD AUTO: 3.56 MILLION/UL (ref 3.88–5.62)
RIGHT ATRIUM AREA SYSTOLE A4C: 23.8 CM2
RIGHT VENTRICLE ID DIMENSION: 4.1 CM
SL CV LEFT ATRIUM LENGTH A2C: 6.9 CM
SL CV LV EF: 60
SL CV PED ECHO LEFT VENTRICLE DIASTOLIC VOLUME (MOD BIPLANE) 2D: 112 ML
SL CV PED ECHO LEFT VENTRICLE SYSTOLIC VOLUME (MOD BIPLANE) 2D: 38 ML
SODIUM SERPL-SCNC: 133 MMOL/L (ref 135–147)
TR MAX PG: 22 MMHG
TR PEAK VELOCITY: 2.4 M/S
TRICUSPID ANNULAR PLANE SYSTOLIC EXCURSION: 2.7 CM
TRICUSPID VALVE PEAK REGURGITATION VELOCITY: 2.36 M/S
WBC # BLD AUTO: 4.46 THOUSAND/UL (ref 4.31–10.16)

## 2023-06-16 PROCEDURE — 99232 SBSQ HOSP IP/OBS MODERATE 35: CPT | Performed by: NURSE PRACTITIONER

## 2023-06-16 PROCEDURE — NC001 PR NO CHARGE: Performed by: ORTHOPAEDIC SURGERY

## 2023-06-16 PROCEDURE — 99232 SBSQ HOSP IP/OBS MODERATE 35: CPT | Performed by: STUDENT IN AN ORGANIZED HEALTH CARE EDUCATION/TRAINING PROGRAM

## 2023-06-16 PROCEDURE — 85025 COMPLETE CBC W/AUTO DIFF WBC: CPT

## 2023-06-16 PROCEDURE — 93306 TTE W/DOPPLER COMPLETE: CPT

## 2023-06-16 PROCEDURE — 82948 REAGENT STRIP/BLOOD GLUCOSE: CPT

## 2023-06-16 PROCEDURE — 80048 BASIC METABOLIC PNL TOTAL CA: CPT

## 2023-06-16 PROCEDURE — 93306 TTE W/DOPPLER COMPLETE: CPT | Performed by: INTERNAL MEDICINE

## 2023-06-16 RX ORDER — INSULIN LISPRO 100 [IU]/ML
5 INJECTION, SOLUTION INTRAVENOUS; SUBCUTANEOUS
Status: DISCONTINUED | OUTPATIENT
Start: 2023-06-16 | End: 2023-06-19 | Stop reason: HOSPADM

## 2023-06-16 RX ORDER — INSULIN GLARGINE 100 [IU]/ML
25 INJECTION, SOLUTION SUBCUTANEOUS
Status: DISCONTINUED | OUTPATIENT
Start: 2023-06-16 | End: 2023-06-19 | Stop reason: HOSPADM

## 2023-06-16 RX ADMIN — TRAZODONE HYDROCHLORIDE 50 MG: 50 TABLET ORAL at 21:54

## 2023-06-16 RX ADMIN — OXYCODONE HYDROCHLORIDE 10 MG: 10 TABLET ORAL at 05:49

## 2023-06-16 RX ADMIN — ACETAMINOPHEN 650 MG: 325 TABLET, FILM COATED ORAL at 12:34

## 2023-06-16 RX ADMIN — ACETAMINOPHEN 650 MG: 325 TABLET, FILM COATED ORAL at 21:53

## 2023-06-16 RX ADMIN — INSULIN LISPRO 5 UNITS: 100 INJECTION, SOLUTION INTRAVENOUS; SUBCUTANEOUS at 12:21

## 2023-06-16 RX ADMIN — INSULIN GLARGINE 25 UNITS: 100 INJECTION, SOLUTION SUBCUTANEOUS at 21:54

## 2023-06-16 RX ADMIN — OXYCODONE HYDROCHLORIDE 10 MG: 10 TABLET ORAL at 11:32

## 2023-06-16 RX ADMIN — HYDROMORPHONE HYDROCHLORIDE 0.5 MG: 1 INJECTION, SOLUTION INTRAMUSCULAR; INTRAVENOUS; SUBCUTANEOUS at 21:54

## 2023-06-16 RX ADMIN — POLYETHYLENE GLYCOL 3350 17 G: 17 POWDER, FOR SOLUTION ORAL at 12:34

## 2023-06-16 RX ADMIN — DILTIAZEM HYDROCHLORIDE 180 MG: 180 CAPSULE, COATED, EXTENDED RELEASE ORAL at 08:14

## 2023-06-16 RX ADMIN — SENNOSIDES AND DOCUSATE SODIUM 2 TABLET: 50; 8.6 TABLET ORAL at 21:53

## 2023-06-16 RX ADMIN — OXYCODONE HYDROCHLORIDE 10 MG: 10 TABLET ORAL at 19:33

## 2023-06-16 RX ADMIN — ENOXAPARIN SODIUM 40 MG: 40 INJECTION SUBCUTANEOUS at 08:14

## 2023-06-16 RX ADMIN — CEFTRIAXONE SODIUM 2000 MG: 10 INJECTION, POWDER, FOR SOLUTION INTRAVENOUS at 17:53

## 2023-06-16 RX ADMIN — INSULIN LISPRO 4 UNITS: 100 INJECTION, SOLUTION INTRAVENOUS; SUBCUTANEOUS at 08:14

## 2023-06-16 RX ADMIN — INSULIN LISPRO 6 UNITS: 100 INJECTION, SOLUTION INTRAVENOUS; SUBCUTANEOUS at 17:51

## 2023-06-16 RX ADMIN — INSULIN LISPRO 6 UNITS: 100 INJECTION, SOLUTION INTRAVENOUS; SUBCUTANEOUS at 12:20

## 2023-06-16 RX ADMIN — ACETAMINOPHEN 650 MG: 325 TABLET, FILM COATED ORAL at 05:48

## 2023-06-16 RX ADMIN — HYDROMORPHONE HYDROCHLORIDE 0.5 MG: 1 INJECTION, SOLUTION INTRAMUSCULAR; INTRAVENOUS; SUBCUTANEOUS at 14:49

## 2023-06-16 RX ADMIN — HYDROMORPHONE HYDROCHLORIDE 0.5 MG: 1 INJECTION, SOLUTION INTRAMUSCULAR; INTRAVENOUS; SUBCUTANEOUS at 01:45

## 2023-06-16 RX ADMIN — HYDROMORPHONE HYDROCHLORIDE 0.5 MG: 1 INJECTION, SOLUTION INTRAMUSCULAR; INTRAVENOUS; SUBCUTANEOUS at 08:14

## 2023-06-16 RX ADMIN — INSULIN LISPRO 5 UNITS: 100 INJECTION, SOLUTION INTRAVENOUS; SUBCUTANEOUS at 17:52

## 2023-06-16 RX ADMIN — INSULIN LISPRO 6 UNITS: 100 INJECTION, SOLUTION INTRAVENOUS; SUBCUTANEOUS at 21:54

## 2023-06-16 NOTE — PROGRESS NOTES
Progress Note - Orthopedics   Meliza Armijo 69 y o  male MRN: [id number]  Unit/Bed#: [facility] 625-01      Subjective:    69 y o male POD 3 left shoulder resection arthroplasty, insertion of antibiotic spacer  No acute events, no new complaints  Patient doing well  Pain well controlled       Labs:  0   Lab Value Date/Time    HCT 31 9 (L) 06/15/2023 0525    HCT 37 0 06/14/2023 0544    HCT 38 3 06/13/2023 2215    HGB 10 3 (L) 06/15/2023 0525    HGB 12 0 06/14/2023 0544    HGB 11 8 (L) 06/13/2023 2215    INR 1 08 06/12/2023 1703    WBC 5 82 06/15/2023 0525    WBC 5 04 06/14/2023 0544    WBC 6 68 06/13/2023 2215    ESR 85 (H) 06/11/2023 1438    CRP 27 0 (H) 06/11/2023 1438       Meds:    Current Facility-Administered Medications:   •  acetaminophen (TYLENOL) tablet 650 mg, 650 mg, Oral, Q8H [facility], AMOR [place], 650 mg at 06/15/23 2141  •  cefTRIAXone (ROCEPHIN) 2,000 mg in dextrose 5 % 50 mL IVPB, 2,000 mg, Intravenous, Q24H, Lacho Teixeira MD, Last Rate: 100 mL/hr at 06/15/23 1754, 2,000 mg at 06/15/23 1754  •  diltiazem (CARDIZEM CD) 24 hr capsule 180 mg, 180 mg, Oral, Daily, Lacho Teixeira MD, 180 mg at 06/15/23 3151  •  enoxaparin (LOVENOX) subcutaneous injection 40 mg, 40 mg, Subcutaneous, Q24H [facility], AMOR Cobian, 40 mg at 06/15/23 0283  •  HYDROmorphone (DILAUDID) injection 0 5 mg, 0 5 mg, Intravenous, Q6H PRN, Lacho Teixeira MD, 0 5 mg at 06/16/23 0145  •  insulin glargine (LANTUS) subcutaneous injection 20 Units 0 2 mL, 20 Units, Subcutaneous, HS, AMOR Cobian, 20 Units at 06/15/23 2140  •  insulin lispro (HumaLOG) 100 units/mL subcutaneous injection 2-12 Units, 2-12 Units, Subcutaneous, 4x Daily (AC & HS), 8 Units at 06/15/23 2139 **AND** Fingerstick Glucose (POCT), , , 4x Daily AC and at bedtime, Orion Pelayo PA-C  •  oxyCODONE (ROXICODONE) immediate release tablet 10 mg, 10 mg, Oral, Q6H PRN, Lacho Teixeira MD, 10 mg at 06/15/23 2020  •  oxyCODONE (ROXICODONE) IR tablet 5 mg, 5 mg, Oral, Q6H PRN, Adithya Landon MD  •  polyethylene glycol (MIRALAX) packet 17 g, 17 g, Oral, Daily PRN, Adithya Landon MD, 17 g at 06/15/23 1457  •  senna-docusate sodium (SENOKOT S) 8 6-50 mg per tablet 2 tablet, 2 tablet, Oral, HS, Yanci Pepper, CRNP, 2 tablet at 06/15/23 2141  •  traZODone (DESYREL) tablet 50 mg, 50 mg, Oral, HS, Adithya Landon MD, 50 mg at 06/15/23 2141    Blood Culture:   Lab Results   Component Value Date    BLOODCX No Growth After 5 Days  09/13/2022    BLOODCX No Growth After 5 Days  09/13/2022       Wound Culture:   Lab Results   Component Value Date    WOUNDCULT 1+ Growth of Klebsiella oxytoca (A) 09/14/2022    WOUNDCULT 1+ Growth of 09/14/2022       Ins and Outs:  I/O last 24 hours: In: 1500 [P O :1380; I V :10; IV Piggyback:110]  Out: 595 [Urine:550; Drains:45]          Physical:  Vitals:    06/15/23 1957   BP: 126/66   Pulse: 82   Resp: 18   Temp: 98 3 °F (36 8 °C)   SpO2: 98%     Musculoskeletal: left Upper Extremity  · Dressing c/d/i  · HV drain in place with serosanguinous drainage, documented 45 output yesterday during the day  · TTP celina-incisionally  · Sensation intact to median/radial/ulnar nerve distributions  · Motor intact AIN/PIN/median/radial/ulnar nerve distributions  · 2+ radial pulse  · Digits warm and well perfused    Assessment:    67 y o male POD 3 L shoulder resection arthroplasty, insertion of antibiotic spacer  Patient doing well  Awaiting finalized cultures for long term antibiotic planning       Plan:  · NWB LUE  · Long term antibiotics per ID, pending final cultures  · Monitor drain output  · Will monitor for ABLA and administer IVF/prbc as indicated for Greater than 2 gram drop or Hgb < 7  · PT/OT  · Pain control  · DVT ppx - per primary  · Dispo: Ortho will follow    Kenny Garcia MD

## 2023-06-16 NOTE — PROGRESS NOTES
-- Patient:  -- MRN: [id number]  -- Aidin Request ID: [id number]  -- Level of care reserved: [address] [place] [address]  -- Partner Reserved: [facility], [place], [address] [phone]  -- Clinical needs requested:  -- Geography searched: [phone]  -- Start of Service:  -- Request sent: 3:55pm EDT on 6/16/2023 by Maurilio Valladares  -- Partner reserved: 4:05pm EDT on 6/16/2023 by Maurilio Valladares  -- Choice list shared:

## 2023-06-16 NOTE — PLAN OF CARE
Problem: PAIN - ADULT  Goal: Verbalizes/displays adequate comfort level or baseline comfort level  Description: Interventions:  - Encourage patient to monitor pain and request assistance  - Assess pain using appropriate pain scale  - Administer analgesics based on type and severity of pain and evaluate response  - Implement non-pharmacological measures as appropriate and evaluate response  - Consider cultural and social influences on pain and pain management  - Notify physician/advanced practitioner if interventions unsuccessful or patient reports new pain  Outcome: Progressing     Problem: INFECTION - ADULT  Goal: Absence or prevention of progression during hospitalization  Description: INTERVENTIONS:  - Assess and monitor for signs and symptoms of infection  - Monitor lab/diagnostic results  - Monitor all insertion sites, i e  indwelling lines, tubes, and drains  - Monitor endotracheal if appropriate and nasal secretions for changes in amount and color  - [place] appropriate cooling/warming therapies per order  - Administer medications as ordered  - Instruct and encourage patient and family to use good hand hygiene technique  - Identify and instruct in appropriate isolation precautions for identified infection/condition  Outcome: Progressing  Goal: Absence of fever/infection during neutropenic period  Description: INTERVENTIONS:  - Monitor WBC    Outcome: Progressing     Problem: SAFETY ADULT  Goal: Patient will remain free of falls  Description: INTERVENTIONS:  - Educate patient/family on patient safety including physical limitations  - Instruct patient to call for assistance with activity   - Consult OT/PT to assist with strengthening/mobility   - Keep Call bell within reach  - Keep bed low and locked with side rails adjusted as appropriate  - Keep care items and personal belongings within reach  - Initiate and maintain comfort rounds  - Make Fall Risk Sign visible to staff  - Offer Toileting every  Hours, in advance of need  - Initiate/Maintain alarm  - Obtain necessary fall risk management equipment:   - Apply yellow socks and bracelet for high fall risk patients  - Consider moving patient to room near nurses station  Outcome: Progressing  Goal: Maintain or return to baseline ADL function  Description: INTERVENTIONS:  -  Assess patient's ability to carry out ADLs; assess patient's baseline for ADL function and identify physical deficits which impact ability to perform ADLs (bathing, care of mouth/teeth, toileting, grooming, dressing, etc )  - Assess/evaluate cause of self-care deficits   - Assess range of motion  - Assess patient's mobility; develop plan if impaired  - Assess patient's need for assistive devices and provide as appropriate  - Encourage maximum independence but intervene and supervise when necessary  - Involve family in performance of ADLs  - Assess for home care needs following discharge   - Consider OT consult to assist with ADL evaluation and planning for discharge  - Provide patient education as appropriate  Outcome: Progressing  Goal: Maintains/Returns to pre admission functional level  Description: INTERVENTIONS:  - Perform BMAT or MOVE assessment daily    - Set and communicate daily mobility goal to care team and patient/family/caregiver  - Collaborate with rehabilitation services on mobility goals if consulted  - Perform Range of Motion  times a day  - Reposition patient every  hours    - Dangle patient  times a day  - Stand patient  times a day  - Ambulate patient  times a day  - Out of bed to chair  times a day   - Out of bed for meals times a day  - Out of bed for toileting  - Record patient progress and toleration of activity level   Outcome: Progressing     Problem: DISCHARGE PLANNING  Goal: Discharge to home or other facility with appropriate resources  Description: INTERVENTIONS:  - Identify barriers to discharge w/patient and caregiver  - Arrange for needed discharge resources and transportation as appropriate  - Identify discharge learning needs (meds, wound care, etc )  - Arrange for interpretive services to assist at discharge as needed  - Refer to Case Management Department for coordinating discharge planning if the patient needs post-hospital services based on physician/advanced practitioner order or complex needs related to functional status, cognitive ability, or social support system  Outcome: Progressing     Problem: Knowledge Deficit  Goal: Patient/family/caregiver demonstrates understanding of disease process, treatment plan, medications, and discharge instructions  Description: Complete learning assessment and assess knowledge base    Interventions:  - Provide teaching at level of understanding  - Provide teaching via preferred learning methods  Outcome: Progressing

## 2023-06-16 NOTE — PROGRESS NOTES
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, prosthetic joint infection   Status post left reverse total shoulder arthroplasty 4/4/2023  The patient presents with recurrent infection and purulent drainage from the left shoulder   CT of the shoulder shows a fluid collection likely communicating to the joint space  Cultures from I&D 5/2 grew coagulase-negative staph and alphahemolytic strep in broth only, as well as growth of cutibacterium acnes and avidum   The patient is hemodynamically stable  Status post left shoulder resection arthroplasty with insertion of antibiotic spacer 6/13/2023 for prosthetic joint infection  Tissue culture from admission is now growing Cutibacterium Avidum  OR cultures are growing Staphylococcus epidermidis in broth only and an anaerobe  Unclear significance of the Staphylococcus epidermidis but given prior growth in wound culture and growth in this deep OR tissue culture, favor covering with antibiotic therapy  Prior cutibacterium isolate was susceptible to penicillin               -continue IV ceftriaxone 2 g every 24 hours for now              -Follow-up pending cultures, micro lab has been asked to hold all cultures for 14 days and susceptibilities will be performed on the Cutibacterium              -[id number] surgery following              -Recommend a 6-week course of IV antibiotics for prosthetic joint infection following antibiotic spacer placement, through 7/25/23              -Monitor white blood cell count, fever curve     2   Type 2 diabetes mellitus, poorly controlled   Hemoglobin A1c 8 4%   This is a risk factor for recurrent infection               -Glycemic management per primary team     3   Obesity   Risk factor for recurrent infection      Plan discussed with the primary team, patient and his wife   ID will follow  Antibiotics:  Ceftriaxone day 5    Subjective: The patient is having ongoing pain in the left shoulder today  He is also having urinary retention  He denies fever or chills  Objective:  Vitals:  Temp:  [97 7 °F (36 5 °C)-98 6 °F (37 °C)] 98 5 °F (36 9 °C)  HR:  [74-90] 90  Resp:  [16-18] 16  BP: (101-133)/(49-67) 101/50  SpO2:  [91 %-99 %] 95 %  Temp (24hrs), Av 3 °F (36 8 °C), Min:97 7 °F (36 5 °C), Max:98 6 °F (37 °C)  Current: Temperature: 98 5 °F (36 9 °C)    Physical Exam:   General: no acute distress, nontoxic   Head: normocephalic, atraumatic  CV: RRR, no murmurs   Lungs: clear to auscultation bilaterally   Abdomen: no distension or tenderness to palpation   Extremities: Left shoulder wrapped, arm in sling  Right upper extremity PICC  Skin: no rashes  Neuro: awake and alert, moving all extremities spontaneously   Psych: calm, cooperative     Labs: All pertinent labs and imaging studies were personally reviewed  Results from last 7 days   Lab Units 23  0544 06/15/23  0525 23  0544   WBC Thousand/uL 4 46 5 82 5 04   HEMOGLOBIN g/dL 9 6* 10 3* 12 0   PLATELETS Thousands/uL 85* 103* 109*     Results from last 7 days   Lab Units 23  0544 06/15/23  0525 23  0544 23  0518 23  0459   SODIUM mmol/L 133* 134* 134*   < > 137   POTASSIUM mmol/L 3 9 3 9 4 3   < > 4 0   CHLORIDE mmol/L 104 105 105   < > 112*   CO2 mmol/L 23 24 23   < > 22   BUN mg/dL 30* 33* 26*   < > 25   CREATININE mg/dL 1 23 1 49* 1 27   < > 1 04   EGFR ml/min/1 73sq m 59 47 57   < > 72   CALCIUM mg/dL 9 0 8 9 9 3   < > 9 2   AST U/L  --   --   --   --  38   ALT U/L  --   --   --   --  41   ALK PHOS U/L  --   --   --   --  162*    < > = values in this interval not displayed           Results from last 7 days   Lab Units 23  1438   CRP mg/L 27 0*               Micro:  Results from last 7 days   Lab Units 23  1712 23  1708 23  1707 23  1651 23  1650 06/13/23  1649 06/11/23  1701   GRAM STAIN RESULT  2+ Polys  No Polys or Bacteria seen No Polys or Bacteria seen Rare Disintegrating polys  No bacteria seen 2+ Polys 1+ Mononuclear Cells  No bacteria seen  1+ Polys  No bacteria seen No Polys or Bacteria seen 3+ Polys*  1+ Gram variable rods*   BODY FLUID CULTURE, STERILE   --   --   --   --   --   --  1+ Growth of Cutibacterium avidum*     Wound cx 6/11- Cutibacterium avidum      Imaging:  I have personally reviewed pertinent imaging reports and images in PACS       CT left shoulder: fluid collection anterior aspect of left shoulder, likely communicating with the glenohumeral joint

## 2023-06-16 NOTE — PROGRESS NOTES
[facility]  Progress Note  Name: Lefty Mckeon  MRN: [id number]  Unit/Bed#: [facility] 403-27 I Date of Admission: 6/11/2023   Date of Service: 6/16/2023 I Hospital Day: 5    Assessment/Plan   * Postoperative cellulitis of surgical wound  Assessment & Plan  Patient here with hx of left shoulder replacement 5/5518 complicated by site wound infection requiring admission from May 1 to May 5 status post I&D with Ortho 5/2  Evaluated by ID at this time  Presented back to the ED 6/11 with noted swelling and erythema of the surgical wound site, aspiration performed, cultures from 6/11 growing Cutibacterium avidum  · CT LUE without contrast shows Localized fluid collection in the anterior aspect of left shoulder measuring 5 8 x 4 4 x 5 1 cm, which probably communicates with the glenohumeral joint  There are small foci of air within the collection, which may be related to superinfection or sequela of prior I&D procedure  · Ortho following, s/p left shoulder resection arthroplasty and insertion of antibiotic spacer 6/13  · Hemovac in place  · ID following, continue CTX- likely transition to PCN in the next 24 hours   · Operative cultures are pending  · Will need 6 week course IV abx, placed PICC  · NWB LUE in sling  · Pain control  · DVT ppx  · PT/OT, CM consult for d/c needs  Acute kidney injury superimposed on chronic kidney disease [facility])  Assessment & Plan  Lab Results   Component Value Date    EGFR 59 06/16/2023    EGFR 47 06/15/2023    EGFR 57 06/14/2023    CREATININE 1 23 06/16/2023    CREATININE 1 49 (H) 06/15/2023    CREATININE 1 27 06/14/2023   · baseline creat 1 0-1 2- improved today from 1 49  · Noted to be retaining urine yesterday, s/p 1x straight cath and since then the patient has been voiding without difficulty     · C/w retention protocol   · s/p 1L of NSS bolus yesterday   · Check BMP in am   · C/w  Holding losartan   · Avoid nephrotoxic drugs and hypotension Murmur  Assessment & Plan  · Detected on exam  · ECHO showing EF 60%, G1DD, LA and RA dilated, AV with mild stenosis and MV with moderate calcification   · Recommend outpatient f/u with cards after discharge and yearly echos to monitor     Obesity (BMI 30 0-34  9)  Assessment & Plan  · BMI 34 87  · Affecting all facets of life    Insomnia  Assessment & Plan  · Continue home med Trazodone 50 mg HS     MEG (obstructive sleep apnea)  Assessment & Plan  · Continue HS CPAP     Essential hypertension  Assessment & Plan  BP acceptable  Home regimen: Cardizem 180 mg QD and Losartan 100 mg QD   · Can continue Cardizem and holding losartan   · Monitor closely     Type 2 diabetes mellitus [facility])  Assessment & Plan  Lab Results   Component Value Date    HGBA1C 8 4 (H) 03/20/2023       Recent Labs     06/15/23  1652 06/15/23  2105 06/16/23  0645 06/16/23  1155   POCGLU 335* 304* 204* 276*     Home regimen on Glargine 40 units HS + Glimepiride 5 mg BID   · Increase lantus to 25 units QHS   · continue SSI and add 5-5-5 with meals   · Diabetic diet  · Monitor accuchecks and adjust regimen PRN             VTE Pharmacologic Prophylaxis: VTE Score: 3 Moderate Risk (Score 3-4) - Pharmacological DVT Prophylaxis Ordered: enoxaparin (Lovenox)  Patient Centered Rounds: I performed bedside rounds with nursing staff today  Discussions with Specialists or Other Care Team Provider: d/w RN d/w ortho d/w ID     Education and Discussions with Family / Patient: Updated  (wife) at bedside  Total Time Spent on Date of Encounter in care of patient: 35 minutes This time was spent on one or more of the following: performing physical exam; counseling and coordination of care; obtaining or reviewing history; documenting in the medical record; reviewing/ordering tests, medications or procedures; communicating with other healthcare professionals and discussing with patient's family/caregivers      Current Length of Stay: 5 day(s)  Current Patient Status: Inpatient   Certification Statement: The patient will continue to require additional inpatient hospital stay due to iv abxs and monitoring cultures   Discharge Plan: Anticipate discharge in 24-48 hrs to home with home services  Code Status: Level 1 - Full Code    Subjective:   Pt sitting oob in the chair  Reports he is having 10/10 pain in the left shoulder, feels spasm like pain  Reports he was straight cathed last night and since his urinary retention resolved  Pt denies any other complaints  Objective:     Vitals:   Temp (24hrs), Av 1 °F (36 7 °C), Min:97 7 °F (36 5 °C), Max:98 6 °F (37 °C)    Temp:  [97 7 °F (36 5 °C)-98 6 °F (37 °C)] 97 7 °F (36 5 °C)  HR:  [65-82] 78  Resp:  [16-18] 16  BP: (113-133)/(51-67) 133/67  SpO2:  [97 %-99 %] 99 %  Body mass index is 35 88 kg/m²  Input and Output Summary (last 24 hours): Intake/Output Summary (Last 24 hours) at 2023 1430  Last data filed at 2023 0900  Gross per 24 hour   Intake 1260 ml   Output 600 ml   Net 660 ml       Physical Exam:   Physical Exam  Vitals and nursing note reviewed  Constitutional:       General: He is not in acute distress  Appearance: He is obese  He is not ill-appearing  Cardiovascular:      Rate and Rhythm: Normal rate and regular rhythm  Heart sounds: Murmur heard  Pulmonary:      Effort: Pulmonary effort is normal  No respiratory distress  Breath sounds: Normal breath sounds  No wheezing or rales  Abdominal:      General: Bowel sounds are normal  There is no distension  Palpations: Abdomen is soft  Tenderness: There is no abdominal tenderness  Musculoskeletal:         General: Swelling and tenderness (left shoulder, pressure dressing on, hemovac in place and sling ) present  Right lower le+ Edema present  Left lower le+ Edema present  Skin:     General: Skin is warm and dry  Neurological:      Mental Status: He is alert   Mental status is at baseline  Psychiatric:         Mood and Affect: Mood normal           Additional Data:     Labs:  Results from last 7 days   Lab Units 06/16/23  0544   WBC Thousand/uL 4 46   HEMOGLOBIN g/dL 9 6*   HEMATOCRIT % 30 9*   PLATELETS Thousands/uL 85*   NEUTROS PCT % 67   LYMPHS PCT % 15   MONOS PCT % 11   EOS PCT % 6     Results from last 7 days   Lab Units 06/16/23  0544 06/13/23  0518 06/12/23  0459   SODIUM mmol/L 133*   < > 137   POTASSIUM mmol/L 3 9   < > 4 0   CHLORIDE mmol/L 104   < > 112*   CO2 mmol/L 23   < > 22   BUN mg/dL 30*   < > 25   CREATININE mg/dL 1 23   < > 1 04   ANION GAP mmol/L 6   < > 3*   CALCIUM mg/dL 9 0   < > 9 2   ALBUMIN g/dL  --   --  2 9*   TOTAL BILIRUBIN mg/dL  --   --  0 84   ALK PHOS U/L  --   --  162*   ALT U/L  --   --  41   AST U/L  --   --  38   GLUCOSE RANDOM mg/dL 177*   < > 83    < > = values in this interval not displayed       Results from last 7 days   Lab Units 06/12/23  1703   INR  1 08     Results from last 7 days   Lab Units 06/16/23  1155 06/16/23  0645 06/15/23  2105 06/15/23  1652 06/15/23  1118 06/15/23  0741 06/14/23  2041 06/14/23  1709 06/14/23  1229 06/14/23  0743 06/13/23  2124 06/13/23  1917   POC GLUCOSE mg/dl 276* 204* 304* 335* 282* 187* 289* 282* 293* 228* 160* 112               Lines/Drains:  Invasive Devices     Peripherally Inserted Central Catheter Line  Duration           PICC Line 17/35/59 Right Basilic 2 days          Drain  Duration           Closed/Suction Drain Left Shoulder Accordion 10 Fr  2 days                Central Line:  Goal for removal: N/A - Discharging with PICC for IV ABX/medications             Imaging: Reviewed radiology reports from this admission including: xray(s)    Recent Cultures (last 7 days):   Results from last 7 days   Lab Units 06/13/23  1712 06/13/23  1708 06/13/23  1707 06/13/23  1651 06/13/23  1650 06/13/23  1649 06/11/23  1701   GRAM STAIN RESULT  2+ Polys  No Polys or Bacteria seen No Polys or Bacteria seen Rare Disintegrating polys  No bacteria seen 2+ Polys 1+ Mononuclear Cells  No bacteria seen  1+ Polys  No bacteria seen No Polys or Bacteria seen 3+ Polys*  1+ Gram variable rods*   BODY FLUID CULTURE, STERILE   --   --   --   --   --   --  1+ Growth of Cutibacterium avidum*       Last 24 Hours Medication List:   Current Facility-Administered Medications   Medication Dose Route Frequency Provider Last Rate   • acetaminophen  650 mg Oral Q8H [address] AMOR Patel     • cefTRIAXone  2,000 mg Intravenous Q24H Jumana Lee MD 2,000 mg (06/15/23 1679)   • diltiazem  180 mg Oral Daily Jumana Lee MD     • enoxaparin  40 mg Subcutaneous Q24H [address] AMOR Cobian     • HYDROmorphone  0 5 mg Intravenous Q6H PRN Jumana Lee MD     • insulin glargine  25 Units Subcutaneous HS AMOR Mcbride     • insulin lispro  2-12 Units Subcutaneous 4x Daily (AC & HS) Mary Kay Hyde PA-C     • insulin lispro  5 Units Subcutaneous TID With Meals AMOR Simon     • oxyCODONE  10 mg Oral Q6H PRN Jumana Lee MD     • oxyCODONE  5 mg Oral Q6H PRN Jumana Lee MD     • polyethylene glycol  17 g Oral Daily PRN Jumana Lee MD     • senna-docusate sodium  2 tablet Oral HS AMOR Mcbride     • traZODone  50 mg Oral HS Jumana Lee MD          Today, Patient Was Seen By: AMOR Simon    **Please Note: This note may have been constructed using a voice recognition system  **

## 2023-06-17 PROBLEM — D75.89 BICYTOPENIA: Status: ACTIVE | Noted: 2020-12-25

## 2023-06-17 PROBLEM — E66.01 MORBID OBESITY (HCC): Status: ACTIVE | Noted: 2022-12-08

## 2023-06-17 PROBLEM — Z79.4 TYPE 2 DIABETES MELLITUS, WITH LONG-TERM CURRENT USE OF INSULIN (HCC): Status: ACTIVE | Noted: 2020-12-25

## 2023-06-17 LAB
ANION GAP SERPL CALCULATED.3IONS-SCNC: 3 MMOL/L (ref 4–13)
BACTERIA SPEC ANAEROBE CULT: ABNORMAL
BACTERIA SPEC BFLD CULT: ABNORMAL
BACTERIA TISS AEROBE CULT: ABNORMAL
BUN SERPL-MCNC: 25 MG/DL (ref 5–25)
CALCIUM SERPL-MCNC: 8.9 MG/DL (ref 8.3–10.1)
CHLORIDE SERPL-SCNC: 105 MMOL/L (ref 96–108)
CK SERPL-CCNC: 102 U/L (ref 39–308)
CO2 SERPL-SCNC: 25 MMOL/L (ref 21–32)
CREAT SERPL-MCNC: 1.14 MG/DL (ref 0.6–1.3)
ERYTHROCYTE [DISTWIDTH] IN BLOOD BY AUTOMATED COUNT: 15.2 % (ref 11.6–15.1)
GFR SERPL CREATININE-BSD FRML MDRD: 65 ML/MIN/1.73SQ M
GLUCOSE SERPL-MCNC: 188 MG/DL (ref 65–140)
GLUCOSE SERPL-MCNC: 244 MG/DL (ref 65–140)
GLUCOSE SERPL-MCNC: 249 MG/DL (ref 65–140)
GLUCOSE SERPL-MCNC: 267 MG/DL (ref 65–140)
GLUCOSE SERPL-MCNC: 297 MG/DL (ref 65–140)
GRAM STN SPEC: ABNORMAL
HCT VFR BLD AUTO: 31.2 % (ref 36.5–49.3)
HGB BLD-MCNC: 9.8 G/DL (ref 12–17)
MCH RBC QN AUTO: 26.8 PG (ref 26.8–34.3)
MCHC RBC AUTO-ENTMCNC: 31.4 G/DL (ref 31.4–37.4)
MCV RBC AUTO: 86 FL (ref 82–98)
PLATELET # BLD AUTO: 96 THOUSANDS/UL (ref 149–390)
PMV BLD AUTO: 12.1 FL (ref 8.9–12.7)
POTASSIUM SERPL-SCNC: 4.3 MMOL/L (ref 3.5–5.3)
RBC # BLD AUTO: 3.65 MILLION/UL (ref 3.88–5.62)
SODIUM SERPL-SCNC: 133 MMOL/L (ref 135–147)
WBC # BLD AUTO: 3.43 THOUSAND/UL (ref 4.31–10.16)

## 2023-06-17 PROCEDURE — 82550 ASSAY OF CK (CPK): CPT | Performed by: STUDENT IN AN ORGANIZED HEALTH CARE EDUCATION/TRAINING PROGRAM

## 2023-06-17 PROCEDURE — 82948 REAGENT STRIP/BLOOD GLUCOSE: CPT

## 2023-06-17 PROCEDURE — 99232 SBSQ HOSP IP/OBS MODERATE 35: CPT | Performed by: INTERNAL MEDICINE

## 2023-06-17 PROCEDURE — 94760 N-INVAS EAR/PLS OXIMETRY 1: CPT

## 2023-06-17 PROCEDURE — 99232 SBSQ HOSP IP/OBS MODERATE 35: CPT | Performed by: STUDENT IN AN ORGANIZED HEALTH CARE EDUCATION/TRAINING PROGRAM

## 2023-06-17 PROCEDURE — NC001 PR NO CHARGE: Performed by: ORTHOPAEDIC SURGERY

## 2023-06-17 PROCEDURE — 80048 BASIC METABOLIC PNL TOTAL CA: CPT | Performed by: NURSE PRACTITIONER

## 2023-06-17 PROCEDURE — 85027 COMPLETE CBC AUTOMATED: CPT | Performed by: NURSE PRACTITIONER

## 2023-06-17 RX ADMIN — HYDROMORPHONE HYDROCHLORIDE 0.5 MG: 1 INJECTION, SOLUTION INTRAMUSCULAR; INTRAVENOUS; SUBCUTANEOUS at 22:53

## 2023-06-17 RX ADMIN — INSULIN GLARGINE 25 UNITS: 100 INJECTION, SOLUTION SUBCUTANEOUS at 21:39

## 2023-06-17 RX ADMIN — TRAZODONE HYDROCHLORIDE 50 MG: 50 TABLET ORAL at 21:39

## 2023-06-17 RX ADMIN — ACETAMINOPHEN 650 MG: 325 TABLET, FILM COATED ORAL at 05:39

## 2023-06-17 RX ADMIN — ENOXAPARIN SODIUM 40 MG: 40 INJECTION SUBCUTANEOUS at 08:46

## 2023-06-17 RX ADMIN — ACETAMINOPHEN 650 MG: 325 TABLET, FILM COATED ORAL at 21:39

## 2023-06-17 RX ADMIN — OXYCODONE HYDROCHLORIDE 10 MG: 10 TABLET ORAL at 12:25

## 2023-06-17 RX ADMIN — DAPTOMYCIN 700 MG: 500 INJECTION, POWDER, LYOPHILIZED, FOR SOLUTION INTRAVENOUS at 10:32

## 2023-06-17 RX ADMIN — SENNOSIDES AND DOCUSATE SODIUM 2 TABLET: 50; 8.6 TABLET ORAL at 21:39

## 2023-06-17 RX ADMIN — HYDROMORPHONE HYDROCHLORIDE 0.5 MG: 1 INJECTION, SOLUTION INTRAMUSCULAR; INTRAVENOUS; SUBCUTANEOUS at 16:41

## 2023-06-17 RX ADMIN — INSULIN LISPRO 4 UNITS: 100 INJECTION, SOLUTION INTRAVENOUS; SUBCUTANEOUS at 08:47

## 2023-06-17 RX ADMIN — DILTIAZEM HYDROCHLORIDE 180 MG: 180 CAPSULE, COATED, EXTENDED RELEASE ORAL at 08:47

## 2023-06-17 RX ADMIN — HYDROMORPHONE HYDROCHLORIDE 0.5 MG: 1 INJECTION, SOLUTION INTRAMUSCULAR; INTRAVENOUS; SUBCUTANEOUS at 10:32

## 2023-06-17 RX ADMIN — INSULIN LISPRO 5 UNITS: 100 INJECTION, SOLUTION INTRAVENOUS; SUBCUTANEOUS at 12:24

## 2023-06-17 RX ADMIN — OXYCODONE HYDROCHLORIDE 10 MG: 10 TABLET ORAL at 18:56

## 2023-06-17 RX ADMIN — OXYCODONE HYDROCHLORIDE 10 MG: 10 TABLET ORAL at 05:49

## 2023-06-17 RX ADMIN — INSULIN LISPRO 6 UNITS: 100 INJECTION, SOLUTION INTRAVENOUS; SUBCUTANEOUS at 12:24

## 2023-06-17 RX ADMIN — ACETAMINOPHEN 650 MG: 325 TABLET, FILM COATED ORAL at 12:25

## 2023-06-17 RX ADMIN — INSULIN LISPRO 5 UNITS: 100 INJECTION, SOLUTION INTRAVENOUS; SUBCUTANEOUS at 17:14

## 2023-06-17 RX ADMIN — INSULIN LISPRO 5 UNITS: 100 INJECTION, SOLUTION INTRAVENOUS; SUBCUTANEOUS at 08:47

## 2023-06-17 RX ADMIN — INSULIN LISPRO 6 UNITS: 100 INJECTION, SOLUTION INTRAVENOUS; SUBCUTANEOUS at 21:40

## 2023-06-17 RX ADMIN — INSULIN LISPRO 4 UNITS: 100 INJECTION, SOLUTION INTRAVENOUS; SUBCUTANEOUS at 17:13

## 2023-06-17 NOTE — ASSESSMENT & PLAN NOTE
· Baseline creatinine of approximately 1 0-1 2 -> improved today at 1 14 from 1 49 peak  · Noted to be retaining urine on 6/15 s/p straight catheterization x 1 -> since then, has been voiding without difficulty  · C/w retention protocol as necessary  · S/p previous IV fluid trial  · Monitor renal function and urine output -> limit/avoid nephrotoxins and hypotension as possible -> holding ARB (Cozaar)

## 2023-06-17 NOTE — PROGRESS NOTES
Progress Note - Infectious Disease   Tana Bal 69 y o  male MRN: [id number]  Unit/Bed#: [facility] 625-01 Encounter: [id number]      Impression/Plan:    1   Left shoulder surgical site infection with abscess, prosthetic joint infection   Status post left reverse total shoulder arthroplasty 4/4/2023  The patient presents with recurrent infection and purulent drainage from the left shoulder   CT of the shoulder shows a fluid collection likely communicating to the joint space  Cultures from I&D 5/2 grew coagulase-negative staph and alphahemolytic strep in broth only, as well as growth of cutibacterium acnes and avidum   The patient is hemodynamically stable  Status post left shoulder resection arthroplasty with insertion of antibiotic spacer 6/13/2023 for prosthetic joint infection  Tissue culture from admission is now growing Cutibacterium Avidum  OR cultures are growing Staphylococcus epidermidis (MRSE) in broth only and Cutibacterium avidum  Unclear significance of the Staphylococcus epidermidis but given prior growth in wound culture and growth in this deep OR tissue culture, favor covering with antibiotic therapy                 -stop IV Ceftriaxone   -will transition to IV Daptomycin 700mg q24hr  Baseline    -scripts placed in chart for case management   -Recommend a 6-week course of IV Daptomycin for prosthetic joint infection following antibiotic spacer placement, through 7/28/23   -weekly CBC diff, CMP, CPK on IV antibiotics    -will arrange ID follow up 2 weeks after discharge              -micro lab has been asked to hold all cultures for 14 days and susceptibilities will be performed on the Cutibacterium   Will continue to follow after discharge              -Orthopedic surgery following              -Monitor white blood cell count, fever curve     2   Type 2 diabetes mellitus, poorly controlled   Hemoglobin A1c 8 4%   This is a risk factor for recurrent infection               -Glycemic management per primary team     3   Obesity   Risk factor for recurrent infection      Plan discussed with the primary team, patient  ID will follow  Antibiotics:  Ceftriaxone day 6    Subjective: The patient reports ongoing left shoulder pain today, no fever or chills, chest pain, shortness of breath  No longer having problems with urinary retention  Objective:  Vitals:  Temp:  [97 7 °F (36 5 °C)-98 6 °F (37 °C)] 97 7 °F (36 5 °C)  HR:  [74-90] 87  Resp:  [16-18] 18  BP: (101-133)/(49-67) 130/57  SpO2:  [91 %-99 %] 94 %  Temp (24hrs), Av 2 °F (36 8 °C), Min:97 7 °F (36 5 °C), Max:98 6 °F (37 °C)  Current: Temperature: 97 7 °F (36 5 °C)    Physical Exam:   General: no acute distress, nontoxic   Head: normocephalic, atraumatic  CV: RRR, no murmurs   Lungs: clear to auscultation bilaterally   Abdomen: no distension or tenderness to palpation   Extremities: Left shoulder wrapped, arm in sling and drain in place  Right upper extremity PICC  Skin: no rashes  Neuro: awake and alert, moving all extremities spontaneously   Psych: calm, cooperative     Labs: All pertinent labs and imaging studies were personally reviewed  Results from last 7 days   Lab Units 23  0553 23  0544 06/15/23  0525   WBC Thousand/uL 3 43* 4 46 5 82   HEMOGLOBIN g/dL 9 8* 9 6* 10 3*   PLATELETS Thousands/uL 96* 85* 103*     Results from last 7 days   Lab Units 23  0553 23  0544 06/15/23  0525 23  0518 23  0459   SODIUM mmol/L 133* 133* 134*   < > 137   POTASSIUM mmol/L 4 3 3 9 3 9   < > 4 0   CHLORIDE mmol/L 105 104 105   < > 112*   CO2 mmol/L 25 23 24   < > 22   BUN mg/dL 25 30* 33*   < > 25   CREATININE mg/dL 1 14 1 23 1 49*   < > 1 04   EGFR ml/min/1 73sq m 65 59 47   < > 72   CALCIUM mg/dL 8 9 9 0 8 9   < > 9 2   AST U/L  --   --   --   --  38   ALT U/L  --   --   --   --  41   ALK PHOS U/L  --   --   --   --  162*    < > = values in this interval not displayed           Results from last 7 days   Lab Units 06/11/23  1438   CRP mg/L 27 0*               Micro:  Results from last 7 days   Lab Units 06/13/23  1712 06/13/23  1708 06/13/23  1707 06/13/23  1651 06/13/23  1650 06/13/23  1649 06/11/23  1701   GRAM STAIN RESULT  2+ Polys  No Polys or Bacteria seen No Polys or Bacteria seen Rare Disintegrating polys  No bacteria seen 2+ Polys 1+ Mononuclear Cells  No bacteria seen  1+ Polys  No bacteria seen No Polys or Bacteria seen 3+ Polys*  1+ Gram variable rods*   BODY FLUID CULTURE, STERILE   --   --   --   --   --   --  1+ Growth of Cutibacterium avidum*     Wound cx 6/11- Cutibacterium avidum      Imaging:  I have personally reviewed pertinent imaging reports and images in PACS       CT left shoulder: fluid collection anterior aspect of left shoulder, likely communicating with the glenohumeral joint

## 2023-06-17 NOTE — PLAN OF CARE
Problem: PAIN - ADULT  Goal: Verbalizes/displays adequate comfort level or baseline comfort level  Description: Interventions:  - Encourage patient to monitor pain and request assistance  - Assess pain using appropriate pain scale  - Administer analgesics based on type and severity of pain and evaluate response  - Implement non-pharmacological measures as appropriate and evaluate response  - Consider cultural and social influences on pain and pain management  - Notify physician/advanced practitioner if interventions unsuccessful or patient reports new pain  6/16/2023 2336 by Chuckie Carrera RN  Outcome: Progressing  6/16/2023 2336 by Chuckie Carrera RN  Outcome: Progressing     Problem: SAFETY ADULT  Goal: Patient will remain free of falls  Description: INTERVENTIONS:  - Educate patient/family on patient safety including physical limitations  - Instruct patient to call for assistance with activity   - Consult OT/PT to assist with strengthening/mobility   - Keep Call bell within reach  - Keep bed low and locked with side rails adjusted as appropriate  - Keep care items and personal belongings within reach  - Initiate and maintain comfort rounds  - Make Fall Risk Sign visible to staff  - Offer Toileting every  Hours, in advance of need  - Initiate/Maintain alarm  - Obtain necessary fall risk management equipment:   - Apply yellow socks and bracelet for high fall risk patients  - Consider moving patient to room near nurses station  6/16/2023 2336 by Chuckie Carrera RN  Outcome: Progressing  6/16/2023 2336 by Chuckie Carrera RN  Outcome: Progressing  Goal: Maintain or return to baseline ADL function  Description: INTERVENTIONS:  -  Assess patient's ability to carry out ADLs; assess patient's baseline for ADL function and identify physical deficits which impact ability to perform ADLs (bathing, care of mouth/teeth, toileting, grooming, dressing, etc )  - Assess/evaluate cause of self-care deficits   - Assess range of motion  - Assess patient's mobility; develop plan if impaired  - Assess patient's need for assistive devices and provide as appropriate  - Encourage maximum independence but intervene and supervise when necessary  - Involve family in performance of ADLs  - Assess for home care needs following discharge   - Consider OT consult to assist with ADL evaluation and planning for discharge  - Provide patient education as appropriate  6/16/2023 2336 by Ashok Brooks RN  Outcome: Progressing  6/16/2023 2336 by Ashok Brooks RN  Outcome: Progressing  Goal: Maintains/Returns to pre admission functional level  Description: INTERVENTIONS:  - Perform BMAT or MOVE assessment daily    - Set and communicate daily mobility goal to care team and patient/family/caregiver  - Collaborate with rehabilitation services on mobility goals if consulted  - Perform Range of Motion  times a day  - Reposition patient every  hours    - Dangle patient  times a day  - Stand patient  times a day  - Ambulate patient  times a day  - Out of bed to chair  times a day   - Out of bed for meals  times a day  - Out of bed for toileting  - Record patient progress and toleration of activity level   6/16/2023 2336 by Ashok Brooks RN  Outcome: Progressing  6/16/2023 2336 by Ashok Brooks RN  Outcome: Progressing

## 2023-06-17 NOTE — ASSESSMENT & PLAN NOTE
Lab Results   Component Value Date    HGBA1C 8 4 (H) 03/20/2023     · Continue basal/prandial insulin with additional SSI coverage prior checks  · Carbohydrate restricted diet  · Hypoglycemia protocol

## 2023-06-17 NOTE — PLAN OF CARE
Problem: PAIN - ADULT  Goal: Verbalizes/displays adequate comfort level or baseline comfort level  Description: Interventions:  - Encourage patient to monitor pain and request assistance  - Assess pain using appropriate pain scale  - Administer analgesics based on type and severity of pain and evaluate response  - Implement non-pharmacological measures as appropriate and evaluate response  - Consider cultural and social influences on pain and pain management  - Notify physician/advanced practitioner if interventions unsuccessful or patient reports new pain  Outcome: Progressing     Problem: INFECTION - ADULT  Goal: Absence or prevention of progression during hospitalization  Description: INTERVENTIONS:  - Assess and monitor for signs and symptoms of infection  - Monitor lab/diagnostic results  - Monitor all insertion sites, i e  indwelling lines, tubes, and drains  - Monitor endotracheal if appropriate and nasal secretions for changes in amount and color  - [place] appropriate cooling/warming therapies per order  - Administer medications as ordered  - Instruct and encourage patient and family to use good hand hygiene technique  - Identify and instruct in appropriate isolation precautions for identified infection/condition  Outcome: Progressing  Goal: Absence of fever/infection during neutropenic period  Description: INTERVENTIONS:  - Monitor WBC    Outcome: Progressing     Problem: SAFETY ADULT  Goal: Patient will remain free of falls  Description: INTERVENTIONS:  - Educate patient/family on patient safety including physical limitations  - Instruct patient to call for assistance with activity   - Consult OT/PT to assist with strengthening/mobility   - Keep Call bell within reach  - Keep bed low and locked with side rails adjusted as appropriate  - Keep care items and personal belongings within reach  - Initiate and maintain comfort rounds  - Make Fall Risk Sign visible to staff  - Offer Toileting every 2 Hours, in advance of need  - Initiate/Maintain alarm  - Obtain necessary fall risk management equipment:   - Apply yellow socks and bracelet for high fall risk patients  - Consider moving patient to room near nurses station  Outcome: Progressing  Goal: Maintain or return to baseline ADL function  Description: INTERVENTIONS:  -  Assess patient's ability to carry out ADLs; assess patient's baseline for ADL function and identify physical deficits which impact ability to perform ADLs (bathing, care of mouth/teeth, toileting, grooming, dressing, etc )  - Assess/evaluate cause of self-care deficits   - Assess range of motion  - Assess patient's mobility; develop plan if impaired  - Assess patient's need for assistive devices and provide as appropriate  - Encourage maximum independence but intervene and supervise when necessary  - Involve family in performance of ADLs  - Assess for home care needs following discharge   - Consider OT consult to assist with ADL evaluation and planning for discharge  - Provide patient education as appropriate  Outcome: Progressing  Goal: Maintains/Returns to pre admission functional level  Description: INTERVENTIONS:  - Perform BMAT or MOVE assessment daily    - Set and communicate daily mobility goal to care team and patient/family/caregiver  - Collaborate with rehabilitation services on mobility goals if consulted  - Perform Range of Motion 3 times a day  - Reposition patient every 2 hours    - Dangle patient 3 times a day  - Stand patient 3 times a day  - Ambulate patient 3 times a day  - Out of bed to chair 3 times a day   - Out of bed for meals 3 times a day  - Out of bed for toileting  - Record patient progress and toleration of activity level   Outcome: Progressing     Problem: DISCHARGE PLANNING  Goal: Discharge to home or other facility with appropriate resources  Description: INTERVENTIONS:  - Identify barriers to discharge w/patient and caregiver  - Arrange for needed discharge resources and transportation as appropriate  - Identify discharge learning needs (meds, wound care, etc )  - Arrange for interpretive services to assist at discharge as needed  - Refer to Case Management Department for coordinating discharge planning if the patient needs post-hospital services based on physician/advanced practitioner order or complex needs related to functional status, cognitive ability, or social support system  Outcome: Progressing     Problem: Knowledge Deficit  Goal: Patient/family/caregiver demonstrates understanding of disease process, treatment plan, medications, and discharge instructions  Description: Complete learning assessment and assess knowledge base    Interventions:  - Provide teaching at level of understanding  - Provide teaching via preferred learning methods  Outcome: Progressing

## 2023-06-17 NOTE — ASSESSMENT & PLAN NOTE
Patient here with hx of left shoulder replacement 4/5892 complicated by site wound infection requiring admission from May 1 to May 5 status post I&D with orthopedics on 5/2  Evaluated by ID at this time  Presented back to the ED 6/11 with noted swelling and erythema of the surgical wound site, aspiration performed, cultures from 6/11 growing Cutibacterium avidum  · CT LUE without contrast shows Localized fluid collection in the anterior aspect of left shoulder measuring 5 8 x 4 4 x 5 1 cm, which probably communicates with the glenohumeral joint  There are small foci of air within the collection, which may be related to superinfection or sequela of prior I&D procedure    · Ortho following, s/p left shoulder resection revision/arthroplasty and insertion of antibiotic spacer 6/13  · Hemovac in place  · ID following -> transitioned antibiotic regimen to IV Daptomycin for a 6-week course through 7/28 (will require weekly CBC and CPK level checks)   · Operative cultures growing staph coccus epidermidis (in broth only) and Cutibacterium avidum  · Currently nonweightbearing of the left upper extremity and immobilizer sling  · PT recommending outpatient rehab - no needs per OT  · Plan for discharge once necessary equipment decided upon and delivered home, along with orthopedic clearance

## 2023-06-17 NOTE — PROGRESS NOTES
Progress Note - Orthopedics   Eleazar Mcclure 69 y o  male MRN: [id number]  [facility]      Subjective:    67 y o male  No acute events, no new complaints  Patient doing well  Pain well controlled       Labs:  0   Lab Value Date/Time    HCT 30 9 (L) 06/16/2023 0544    HCT 31 9 (L) 06/15/2023 0525    HCT 37 0 06/14/2023 0544    HGB 9 6 (L) 06/16/2023 0544    HGB 10 3 (L) 06/15/2023 0525    HGB 12 0 06/14/2023 0544    INR 1 08 06/12/2023 1703    WBC 4 46 06/16/2023 0544    WBC 5 82 06/15/2023 0525    WBC 5 04 06/14/2023 0544    ESR 85 (H) 06/11/2023 1438    CRP 27 0 (H) 06/11/2023 1438       Meds:    Current Facility-Administered Medications:   •  acetaminophen (TYLENOL) tablet 650 mg, 650 mg, Oral, Q8H [address], AMOR Gordillo, 650 mg at 06/17/23 0539  •  cefTRIAXone (ROCEPHIN) 2,000 mg in dextrose 5 % 50 mL IVPB, 2,000 mg, Intravenous, Q24H, Paulina Guerrero MD, Stopped at 06/16/23 1854  •  diltiazem (CARDIZEM CD) 24 hr capsule 180 mg, 180 mg, Oral, Daily, Paulina Guerrero MD, 180 mg at 06/16/23 0814  •  enoxaparin (LOVENOX) subcutaneous injection 40 mg, 40 mg, Subcutaneous, Q24H [address], AMOR Cobian, 40 mg at 06/16/23 2186  •  HYDROmorphone (DILAUDID) injection 0 5 mg, 0 5 mg, Intravenous, Q6H PRN, Paulina Guerrero MD, 0 5 mg at 06/16/23 2154  •  insulin glargine (LANTUS) subcutaneous injection 25 Units 0 25 mL, 25 Units, Subcutaneous, HS, AMOR Mcbride, 25 Units at 06/16/23 2154  •  insulin lispro (HumaLOG) 100 units/mL subcutaneous injection 2-12 Units, 2-12 Units, Subcutaneous, 4x Daily (AC & HS), 6 Units at 06/16/23 2154 **AND** Fingerstick Glucose (POCT), , , 4x Daily AC and at bedtime, Rosa Elena Quintero PA-C  •  insulin lispro (HumaLOG) 100 units/mL subcutaneous injection 5 Units, 5 Units, Subcutaneous, TID With Meals, AMOR Dudley, 5 Units at 06/16/23 1752  •  oxyCODONE (ROXICODONE) immediate release tablet 10 mg, 10 mg, Oral, Q6H PRN, Paulina Guerrero MD, 10 mg at 06/17/23 7266  • oxyCODONE (ROXICODONE) IR tablet 5 mg, 5 mg, Oral, Q6H PRN, Sudeep Suarez MD  •  polyethylene glycol (MIRALAX) packet 17 g, 17 g, Oral, Daily PRN, Sudeep Suarez MD, 17 g at 06/16/23 1234  •  senna-docusate sodium (SENOKOT S) 8 6-50 mg per tablet 2 tablet, 2 tablet, Oral, HS, AMOR Mcbride, 2 tablet at 06/16/23 2153  •  traZODone (DESYREL) tablet 50 mg, 50 mg, Oral, HS, Sudeep Suarez MD, 50 mg at 06/16/23 2154    Blood Culture:   Lab Results   Component Value Date    BLOODCX No Growth After 5 Days  09/13/2022    BLOODCX No Growth After 5 Days  09/13/2022       Wound Culture:   Lab Results   Component Value Date    WOUNDCULT 1+ Growth of Klebsiella oxytoca (A) 09/14/2022    WOUNDCULT 1+ Growth of 09/14/2022       Ins and Outs:  I/O last 24 hours: In: 101 7 [IV Piggyback:101 7]  Out: 2040 [Urine:1940; Drains:100]          Physical:  Vitals:    06/16/23 2201   BP: 133/57   Pulse: 83   Resp: 18   Temp: 98 4 °F (36 9 °C)   SpO2: 95%     Musculoskeletal: left Upper Extremity  · Dressing c/d/i with sling in place   · HV drain in place with serosanguinous drainage  · TTP celina-incisionally  · Sensation intact to median/radial/ulnar nerve distributions  · Motor intact AIN/PIN/median/radial/ulnar nerve distributions  · 2+ radial pulse  · Digits warm and well perfused    Assessment:    67 y o male POD 4 L shoulder resection arthroplasty, insertion of antibiotic spacer  Patient doing well  Awaiting finalized cultures for long term antibiotic planning   Preliminary cultures in broth show staph epidermidis     Plan:  · NWB LUE in sling   · Long term antibiotics per ID, pending final cultures  · Monitor drain output  · PT/OT  · Pain control  · DVT ppx - per primary  · Dispo: Ortho will follow     Desma Cowden, MD

## 2023-06-17 NOTE — PROGRESS NOTES
[address] [facility]  Progress Note  Name: Amberly Alfaro  MRN: [id number]  Unit/Bed#: [facility] 839-45 I Date of Admission: 6/11/2023   Date of Service: 6/17/2023 I Hospital Day: 6      Assessment & Plan:    * Postoperative infection of surgical wound  Assessment & Plan  Patient here with hx of left shoulder replacement 9/6584 complicated by site wound infection requiring admission from May 1 to May 5 status post I&D with orthopedics on 5/2  Evaluated by ID at this time  Presented back to the ED 6/11 with noted swelling and erythema of the surgical wound site, aspiration performed, cultures from 6/11 growing Cutibacterium avidum  · CT LUE without contrast shows Localized fluid collection in the anterior aspect of left shoulder measuring 5 8 x 4 4 x 5 1 cm, which probably communicates with the glenohumeral joint  There are small foci of air within the collection, which may be related to superinfection or sequela of prior I&D procedure    · Ortho following, s/p left shoulder resection revision/arthroplasty and insertion of antibiotic spacer 6/13  · Hemovac in place  · ID following -> transitioned antibiotic regimen to IV Daptomycin for a 6-week course through 7/28 (will require weekly CBC and CPK level checks)   · Operative cultures growing staph coccus epidermidis (in broth only) and Cutibacterium avidum  · Currently nonweightbearing of the left upper extremity and immobilizer sling  · PT recommending outpatient rehab - no needs per OT  · Plan for discharge once necessary equipment decided upon and delivered home, along with orthopedic clearance    Acute kidney injury superimposed on chronic kidney disease stage 3  Assessment & Plan  · Baseline creatinine of approximately 1 0-1 2 -> improved today at 1 14 from 1 49 peak  · Noted to be retaining urine on 6/15 s/p straight catheterization x 1 -> since then, has been voiding without difficulty  · C/w retention protocol as necessary  · S/p previous IV fluid trial  · Monitor renal function and urine output -> limit/avoid nephrotoxins and hypotension as possible -> holding ARB (Cozaar)    Essential hypertension  Assessment & Plan  · Continue Cardizem - holding Cozaar in the setting of acute kidney injury (resume when able)    Morbid obesity (HCC)  Assessment & Plan  · BMI of 39 27 currently  · Lifestyle/diet modifications    MEG (obstructive sleep apnea)  Assessment & Plan  · C/w CPAP QHS  · Encourage weight loss    Insulin-dependent diabetes mellitus  Assessment & Plan  Lab Results   Component Value Date    HGBA1C 8 4 (H) 03/20/2023     · Continue basal/prandial insulin with additional SSI coverage prior checks  · Carbohydrate restricted diet  · Hypoglycemia protocol    Insomnia  Assessment & Plan  · Continue Trazodone QHS     Bicytopenia  Assessment & Plan  · Concurrent anemia and thrombocytopenia  · Monitor CBC transfuse if necessary      DVT Prophylaxis:  Lovenox    Patient Centered Rounds:  I have performed bedside rounds and discussed plan of care with nursing today  Discussions with Specialists or Other Care Team Provider:  see above assessments if applicable    Education and Discussions with Family / Patient:  Patient, along with wife, at bedside today    Time Spent for Care:  32 minutes  More than 50% of total time spent on counseling and coordination of care, on one or more of the following: performing physical exam; counseling and coordination of care, obtaining or reviewing history, documenting in the medical record, reviewing/ordering tests/medications/procedures, and communicating with other healthcare professionals  Current Length of Stay: 6 day(s)  Current Patient Status: Inpatient   Certification Statement:  Patient will continue to require additional hospital stay due to assessments as noted above  Code Status: Level 1 - Full Code        Subjective:     Encountered earlier in the day    Sitting upright in chair with left arm in an immobilizer sling  States pain is fairly controlled on current analgesic regimen at this time  Denies other acute complaints at this time  Objective:     Vitals:   Temp (24hrs), Av 2 °F (36 8 °C), Min:97 7 °F (36 5 °C), Max:98 5 °F (36 9 °C)    Temp:  [97 7 °F (36 5 °C)-98 5 °F (36 9 °C)] 98 2 °F (36 8 °C)  HR:  [69-90] 74  Resp:  [16-18] 16  BP: (101-133)/(50-57) 132/54  SpO2:  [94 %-100 %] 96 %  Body mass index is 39 27 kg/m²  Input and Output Summary (last 24 hours):        Intake/Output Summary (Last 24 hours) at 2023 1755  Last data filed at 2023 1457  Gross per 24 hour   Intake 1111 67 ml   Output 2700 ml   Net -1588 33 ml       Physical Exam:     GENERAL  obese - controlled distress from pain   HEAD   Normocephalic - atraumatic   EYES  nonicteric   MOUTH   Mucosa moist   NECK   Supple - full range of motion   CARDIAC  rate controlled - S1/S2 positive   PULMONARY    Clear breath sounds bilaterally - nonlabored respirations   ABDOMEN   Soft - nontender/nondistended - active bowel sounds   MUSCULOSKELETAL   Motor strength/range of motion deconditioned and limited of the LUE placed in immobilizer sling - bilateral distal LE edema   NEUROLOGIC   Alert/oriented at baseline   SKIN   Chronic wrinkles/blemishes - Hemovac drain in place on left shoulder   PSYCHIATRIC   Mood/affect pleasant         Additional Data:     Labs & Recent Cultures:    Results from last 7 days   Lab Units 23  0553 23  0544   WBC Thousand/uL 3 43* 4 46   HEMOGLOBIN g/dL 9 8* 9 6*   HEMATOCRIT % 31 2* 30 9*   PLATELETS Thousands/uL 96* 85*   NEUTROS PCT %  --  67   LYMPHS PCT %  --  15   MONOS PCT %  --  11   EOS PCT %  --  6     Results from last 7 days   Lab Units 23  0553 23  0518 23  0459   POTASSIUM mmol/L 4 3   < > 4 0   CHLORIDE mmol/L 105   < > 112*   CO2 mmol/L 25   < > 22   BUN mg/dL 25   < > 25   CREATININE mg/dL 1 14   < > 1 04   CALCIUM mg/dL 8 9   < > 9 2   ALK PHOS U/L  -- --  162*   ALT U/L  --   --  41   AST U/L  --   --  38    < > = values in this interval not displayed       Results from last 7 days   Lab Units 06/12/23  1703   INR  1 08     Results from last 7 days   Lab Units 06/17/23  1659 06/17/23  1123 06/17/23  0757 06/16/23  2039 06/16/23  1714 06/16/23  1155 06/16/23  0645 06/15/23  2105 06/15/23  1652 06/15/23  1118 06/15/23  0741 06/14/23  2041   POC GLUCOSE mg/dl 249* 267* 244* 256* 271* 276* 204* 304* 335* 282* 187* 289*                 Results from last 7 days   Lab Units 06/13/23  1712 06/13/23  1708 06/13/23  1707 06/13/23  1651 06/13/23  1650 06/13/23  1649 06/11/23  1701   GRAM STAIN RESULT  2+ Polys  No Polys or Bacteria seen No Polys or Bacteria seen Rare Disintegrating polys  No bacteria seen 2+ Polys 1+ Mononuclear Cells  No bacteria seen  1+ Polys  No bacteria seen No Polys or Bacteria seen 3+ Polys*  1+ Gram variable rods*   BODY FLUID CULTURE, STERILE   --   --   --   --   --   --  1+ Growth of Cutibacterium avidum*         Lines/Drains:  Invasive Devices     Peripherally Inserted Central Catheter Line  Duration           PICC Line 38/32/19 Right Basilic 3 days          Drain  Duration           Closed/Suction Drain Left Shoulder Accordion 10 Fr  3 days                  Last 24 Hours Medication List:   Current Facility-Administered Medications   Medication Dose Route Frequency Provider Last Rate   • acetaminophen  650 mg Oral Q8H [address] AMOR Morrison     • DAPTOmycin  700 mg Intravenous Q24H Nohelia Clifford MD Stopped (06/17/23 1102)   • diltiazem  180 mg Oral Daily aC Serrano MD     • enoxaparin  40 mg Subcutaneous Q24H [address] AMOR Cobian     • HYDROmorphone  0 5 mg Intravenous Q6H PRN Ca Serarno MD     • insulin glargine  25 Units Subcutaneous HS AMOR Mcbride     • insulin lispro  2-12 Units Subcutaneous 4x Daily (AC & HS) Bharti Woods PA-C     • insulin lispro  5 Units Subcutaneous TID With Meals Lizz Dominguez AMOR Pepper     • oxyCODONE  10 mg Oral Q6H PRN Lissette Burnham MD     • oxyCODONE  5 mg Oral Q6H PRN Lissette Burnham MD     • polyethylene glycol  17 g Oral Daily PRN Lissette Burnham MD     • senna-docusate sodium  2 tablet Oral HS AMOR Mcbride     • traZODone  50 mg Oral HS Lissette Burnham MD                      ** Please Note: This note is constructed using a voice recognition dictation system  An occasional wrong word/phrase or “sound-a-like” substitution may have been picked up by dictation device due to the inherent limitations of voice recognition software  Read the chart carefully and recognize, using reasonable context, where substitutions may have occurred  **

## 2023-06-18 LAB
ANION GAP SERPL CALCULATED.3IONS-SCNC: 5 MMOL/L (ref 4–13)
BACTERIA SPEC ANAEROBE CULT: ABNORMAL
BACTERIA SPEC ANAEROBE CULT: ABNORMAL
BACTERIA TISS AEROBE CULT: ABNORMAL
BASOPHILS # BLD AUTO: 0.02 THOUSANDS/ÂΜL (ref 0–0.1)
BASOPHILS NFR BLD AUTO: 1 % (ref 0–1)
BUN SERPL-MCNC: 20 MG/DL (ref 5–25)
CALCIUM SERPL-MCNC: 8.7 MG/DL (ref 8.3–10.1)
CHLORIDE SERPL-SCNC: 107 MMOL/L (ref 96–108)
CO2 SERPL-SCNC: 23 MMOL/L (ref 21–32)
CREAT SERPL-MCNC: 0.96 MG/DL (ref 0.6–1.3)
EOSINOPHIL # BLD AUTO: 0.19 THOUSAND/ÂΜL (ref 0–0.61)
EOSINOPHIL NFR BLD AUTO: 7 % (ref 0–6)
ERYTHROCYTE [DISTWIDTH] IN BLOOD BY AUTOMATED COUNT: 15.5 % (ref 11.6–15.1)
GFR SERPL CREATININE-BSD FRML MDRD: 80 ML/MIN/1.73SQ M
GLUCOSE SERPL-MCNC: 176 MG/DL (ref 65–140)
GLUCOSE SERPL-MCNC: 184 MG/DL (ref 65–140)
GLUCOSE SERPL-MCNC: 217 MG/DL (ref 65–140)
GLUCOSE SERPL-MCNC: 247 MG/DL (ref 65–140)
GLUCOSE SERPL-MCNC: 264 MG/DL (ref 65–140)
GRAM STN SPEC: ABNORMAL
GRAM STN SPEC: ABNORMAL
HCT VFR BLD AUTO: 29.3 % (ref 36.5–49.3)
HGB BLD-MCNC: 9.3 G/DL (ref 12–17)
IMM GRANULOCYTES # BLD AUTO: 0.01 THOUSAND/UL (ref 0–0.2)
IMM GRANULOCYTES NFR BLD AUTO: 0 % (ref 0–2)
LYMPHOCYTES # BLD AUTO: 0.53 THOUSANDS/ÂΜL (ref 0.6–4.47)
LYMPHOCYTES NFR BLD AUTO: 19 % (ref 14–44)
MCH RBC QN AUTO: 27.5 PG (ref 26.8–34.3)
MCHC RBC AUTO-ENTMCNC: 31.7 G/DL (ref 31.4–37.4)
MCV RBC AUTO: 87 FL (ref 82–98)
MONOCYTES # BLD AUTO: 0.35 THOUSAND/ÂΜL (ref 0.17–1.22)
MONOCYTES NFR BLD AUTO: 13 % (ref 4–12)
NEUTROPHILS # BLD AUTO: 1.67 THOUSANDS/ÂΜL (ref 1.85–7.62)
NEUTS SEG NFR BLD AUTO: 60 % (ref 43–75)
NRBC BLD AUTO-RTO: 0 /100 WBCS
PLATELET # BLD AUTO: 82 THOUSANDS/UL (ref 149–390)
PMV BLD AUTO: 12.1 FL (ref 8.9–12.7)
POTASSIUM SERPL-SCNC: 4.2 MMOL/L (ref 3.5–5.3)
RBC # BLD AUTO: 3.38 MILLION/UL (ref 3.88–5.62)
SODIUM SERPL-SCNC: 135 MMOL/L (ref 135–147)
WBC # BLD AUTO: 2.77 THOUSAND/UL (ref 4.31–10.16)

## 2023-06-18 PROCEDURE — 82948 REAGENT STRIP/BLOOD GLUCOSE: CPT

## 2023-06-18 PROCEDURE — 94760 N-INVAS EAR/PLS OXIMETRY 1: CPT

## 2023-06-18 PROCEDURE — NC001 PR NO CHARGE: Performed by: ORTHOPAEDIC SURGERY

## 2023-06-18 PROCEDURE — 99232 SBSQ HOSP IP/OBS MODERATE 35: CPT | Performed by: INTERNAL MEDICINE

## 2023-06-18 PROCEDURE — 80048 BASIC METABOLIC PNL TOTAL CA: CPT | Performed by: INTERNAL MEDICINE

## 2023-06-18 PROCEDURE — 85025 COMPLETE CBC W/AUTO DIFF WBC: CPT | Performed by: INTERNAL MEDICINE

## 2023-06-18 PROCEDURE — 99232 SBSQ HOSP IP/OBS MODERATE 35: CPT | Performed by: STUDENT IN AN ORGANIZED HEALTH CARE EDUCATION/TRAINING PROGRAM

## 2023-06-18 RX ORDER — TRIAMTERENE AND HYDROCHLOROTHIAZIDE 37.5; 25 MG/1; MG/1
1 CAPSULE ORAL EVERY MORNING
COMMUNITY
End: 2023-06-28

## 2023-06-18 RX ORDER — LOSARTAN POTASSIUM 50 MG/1
100 TABLET ORAL DAILY
Status: DISCONTINUED | OUTPATIENT
Start: 2023-06-19 | End: 2023-06-19 | Stop reason: HOSPADM

## 2023-06-18 RX ADMIN — DAPTOMYCIN 700 MG: 500 INJECTION, POWDER, LYOPHILIZED, FOR SOLUTION INTRAVENOUS at 10:31

## 2023-06-18 RX ADMIN — HYDROMORPHONE HYDROCHLORIDE 0.5 MG: 1 INJECTION, SOLUTION INTRAMUSCULAR; INTRAVENOUS; SUBCUTANEOUS at 08:31

## 2023-06-18 RX ADMIN — INSULIN LISPRO 5 UNITS: 100 INJECTION, SOLUTION INTRAVENOUS; SUBCUTANEOUS at 08:27

## 2023-06-18 RX ADMIN — OXYCODONE HYDROCHLORIDE 10 MG: 10 TABLET ORAL at 11:36

## 2023-06-18 RX ADMIN — HYDROMORPHONE HYDROCHLORIDE 0.5 MG: 1 INJECTION, SOLUTION INTRAMUSCULAR; INTRAVENOUS; SUBCUTANEOUS at 14:54

## 2023-06-18 RX ADMIN — HYDROMORPHONE HYDROCHLORIDE 0.5 MG: 1 INJECTION, SOLUTION INTRAMUSCULAR; INTRAVENOUS; SUBCUTANEOUS at 20:58

## 2023-06-18 RX ADMIN — TRAZODONE HYDROCHLORIDE 50 MG: 50 TABLET ORAL at 21:00

## 2023-06-18 RX ADMIN — INSULIN LISPRO 6 UNITS: 100 INJECTION, SOLUTION INTRAVENOUS; SUBCUTANEOUS at 17:35

## 2023-06-18 RX ADMIN — INSULIN LISPRO 2 UNITS: 100 INJECTION, SOLUTION INTRAVENOUS; SUBCUTANEOUS at 08:27

## 2023-06-18 RX ADMIN — OXYCODONE HYDROCHLORIDE 10 MG: 10 TABLET ORAL at 17:36

## 2023-06-18 RX ADMIN — ENOXAPARIN SODIUM 40 MG: 40 INJECTION SUBCUTANEOUS at 08:30

## 2023-06-18 RX ADMIN — SENNOSIDES AND DOCUSATE SODIUM 2 TABLET: 50; 8.6 TABLET ORAL at 21:00

## 2023-06-18 RX ADMIN — INSULIN LISPRO 4 UNITS: 100 INJECTION, SOLUTION INTRAVENOUS; SUBCUTANEOUS at 21:10

## 2023-06-18 RX ADMIN — ACETAMINOPHEN 650 MG: 325 TABLET, FILM COATED ORAL at 05:21

## 2023-06-18 RX ADMIN — INSULIN LISPRO 5 UNITS: 100 INJECTION, SOLUTION INTRAVENOUS; SUBCUTANEOUS at 17:36

## 2023-06-18 RX ADMIN — INSULIN LISPRO 4 UNITS: 100 INJECTION, SOLUTION INTRAVENOUS; SUBCUTANEOUS at 12:22

## 2023-06-18 RX ADMIN — DILTIAZEM HYDROCHLORIDE 180 MG: 180 CAPSULE, COATED, EXTENDED RELEASE ORAL at 08:30

## 2023-06-18 RX ADMIN — INSULIN LISPRO 5 UNITS: 100 INJECTION, SOLUTION INTRAVENOUS; SUBCUTANEOUS at 12:23

## 2023-06-18 RX ADMIN — OXYCODONE HYDROCHLORIDE 10 MG: 10 TABLET ORAL at 05:21

## 2023-06-18 RX ADMIN — ACETAMINOPHEN 650 MG: 325 TABLET, FILM COATED ORAL at 12:23

## 2023-06-18 RX ADMIN — ACETAMINOPHEN 650 MG: 325 TABLET, FILM COATED ORAL at 21:00

## 2023-06-18 RX ADMIN — INSULIN GLARGINE 25 UNITS: 100 INJECTION, SOLUTION SUBCUTANEOUS at 21:09

## 2023-06-18 NOTE — PROGRESS NOTES
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, prosthetic joint infection   Status post left reverse total shoulder arthroplasty 4/4/2023  The patient presents with recurrent infection and purulent drainage from the left shoulder   CT of the shoulder shows a fluid collection likely communicating to the joint space  Cultures from I&D 5/2 grew coagulase-negative staph and alphahemolytic strep in broth only, as well as growth of cutibacterium acnes and avidum   The patient is hemodynamically stable  Status post left shoulder resection arthroplasty with insertion of antibiotic spacer 6/13/2023 for prosthetic joint infection  Tissue culture from admission is now growing Cutibacterium Avidum  OR cultures are growing Cutibacterium avidum in multiple cultures and Staphylococcus epidermidis (MRSE) in broth only, 1 colony micrococcus luteus  Suspect the cutibacterium is the main pathogen  Unclear significance of the Staphylococcus epidermidis and micrococcus but given prior growth in wound culture and growth in this deep OR tissue culture, favor covering with antibiotic therapy                 -Continue IV Daptomycin 700mg q24hr  Baseline    -scripts placed in chart for case management   -Recommend a 6-week course of IV Daptomycin for prosthetic joint infection following antibiotic spacer placement, through 7/28/23   -weekly CBC diff, CMP, CPK on IV antibiotics    -will arrange ID follow up 2 weeks after discharge              -micro lab has been asked to perform susceptibilities on the Cutibacterium   Will continue to follow after discharge              -Orthopedic surgery following              -Monitor white blood cell count, fever curve     2   Type 2 diabetes mellitus, poorly controlled   Hemoglobin A1c 8 4%   This is a risk factor for recurrent infection               -Glycemic management per primary team     3   Obesity   Risk factor for recurrent infection      Plan discussed with the primary team, patient  ID will follow  Antibiotics:  Daptomycin day 2  Postop day 5    Subjective: The patient is feeling okay today, continues to have left shoulder pain  He has no fever, chills  Tolerating daptomycin without issue  Objective:  Vitals:  Temp:  [97 7 °F (36 5 °C)-98 9 °F (37 2 °C)] 98 2 °F (36 8 °C)  HR:  [70-97] 70  Resp:  [16-18] 18  BP: (114-137)/(45-73) 114/45  SpO2:  [94 %-98 %] 98 %  Temp (24hrs), Av 3 °F (36 8 °C), Min:97 7 °F (36 5 °C), Max:98 9 °F (37 2 °C)  Current: Temperature: 98 2 °F (36 8 °C)    Physical Exam:   General: no acute distress, nontoxic   Head: normocephalic, atraumatic  CV: RRR, no murmurs   Lungs: clear to auscultation bilaterally   Abdomen: no distension or tenderness to palpation   Extremities: Left shoulder wrapped, arm in sling and drain in place  Right upper extremity PICC  Skin: no rashes  Neuro: awake and alert, moving all extremities spontaneously   Psych: calm, cooperative     Labs: All pertinent labs and imaging studies were personally reviewed  Results from last 7 days   Lab Units 23  0517 23  0553 23  0544   WBC Thousand/uL 2 77* 3 43* 4 46   HEMOGLOBIN g/dL 9 3* 9 8* 9 6*   PLATELETS Thousands/uL 82* 96* 85*     Results from last 7 days   Lab Units 23  0517 23  0553 23  0544 23  0518 23  0459   SODIUM mmol/L 135 133* 133*   < > 137   POTASSIUM mmol/L 4 2 4 3 3 9   < > 4 0   CHLORIDE mmol/L 107 105 104   < > 112*   CO2 mmol/L 23 25 23   < > 22   BUN mg/dL 20 25 30*   < > 25   CREATININE mg/dL 0 96 1 14 1 23   < > 1 04   EGFR ml/min/1 73sq m 80 65 59   < > 72   CALCIUM mg/dL 8 7 8 9 9 0   < > 9 2   AST U/L  --   --   --   --  38   ALT U/L  --   --   --   --  41   ALK PHOS U/L  --   --   --   --  162*    < > = values in this interval not displayed  Micro:  Results from last 7 days   Lab Units 06/13/23  1712 06/13/23  1708 06/13/23  1707 06/13/23  1651 06/13/23  1650 06/13/23  1649   GRAM STAIN RESULT  2+ Polys  No Polys or Bacteria seen No Polys or Bacteria seen Rare Disintegrating polys  No bacteria seen 2+ Polys 1+ Polys  No bacteria seen  1+ Mononuclear Cells  No bacteria seen No Polys or Bacteria seen     Wound cx 6/11- Cutibacterium avidum      Imaging:  I have personally reviewed pertinent imaging reports and images in PACS       CT left shoulder: fluid collection anterior aspect of left shoulder, likely communicating with the glenohumeral joint

## 2023-06-18 NOTE — ASSESSMENT & PLAN NOTE
Patient here with hx of left shoulder replacement 8/0746 complicated by site wound infection requiring admission from May 1 to May 5 status post I&D with orthopedics on 5/2  Evaluated by ID at this time  Presented back to the ED 6/11 with noted swelling and erythema of the surgical wound site, aspiration performed, cultures from 6/11 growing Cutibacterium avidum  · CT LUE without contrast shows Localized fluid collection in the anterior aspect of left shoulder measuring 5 8 x 4 4 x 5 1 cm, which probably communicates with the glenohumeral joint  There are small foci of air within the collection, which may be related to superinfection or sequela of prior I&D procedure    · Ortho following, s/p left shoulder resection revision/arthroplasty and insertion of antibiotic spacer 6/13  · Hemovac in place  · ID following -> transitioned antibiotic regimen to IV Daptomycin for a 6-week course through 7/28 (will require weekly CBC and CPK level checks)   · Operative cultures growing staph coccus epidermidis (in broth only) and Cutibacterium avidum  · Currently nonweightbearing of the left upper extremity and immobilizer sling  · PT recommending outpatient rehab - no needs per OT  · Plan for discharge once necessary equipment decided upon and delivered home, along with orthopedic clearance

## 2023-06-18 NOTE — ASSESSMENT & PLAN NOTE
· Baseline creatinine of approximately 1 0-1 2 -> improved today at 0 96 from a 1 49 peak  · Noted to be retaining urine on 6/15 s/p straight catheterization x 1 -> since then, has been voiding without difficulty  · C/w retention protocol as necessary  · S/p previous IV fluid trial  · Monitor renal function and urine output -> limit/avoid nephrotoxins and hypotension as possible -> can resume ARB (Cozaar) tomorrow

## 2023-06-18 NOTE — PROGRESS NOTES
[facility] [facility]  Progress Note  Name: Ramirez Ibanez  MRN: [id number]  Unit/Bed#: [facility] 072-36 I Date of Admission: 6/11/2023   Date of Service: 6/18/2023 I Hospital Day: 7      Assessment & Plan:    * Postoperative infection of surgical wound  Assessment & Plan  Patient here with hx of left shoulder replacement 8/4809 complicated by site wound infection requiring admission from May 1 to May 5 status post I&D with orthopedics on 5/2  Evaluated by ID at this time  Presented back to the ED 6/11 with noted swelling and erythema of the surgical wound site, aspiration performed, cultures from 6/11 growing Cutibacterium avidum  · CT LUE without contrast shows Localized fluid collection in the anterior aspect of left shoulder measuring 5 8 x 4 4 x 5 1 cm, which probably communicates with the glenohumeral joint  There are small foci of air within the collection, which may be related to superinfection or sequela of prior I&D procedure    · Ortho following, s/p left shoulder resection revision/arthroplasty and insertion of antibiotic spacer 6/13  · Hemovac in place  · ID following -> transitioned antibiotic regimen to IV Daptomycin for a 6-week course through 7/28 (will require weekly CBC and CPK level checks)   · Operative cultures growing staph coccus epidermidis (in broth only) and Cutibacterium avidum  · Currently nonweightbearing of the left upper extremity and immobilizer sling  · PT recommending outpatient rehab - no needs per OT  · Plan for discharge once necessary equipment decided upon and delivered home, along with orthopedic clearance    Acute kidney injury superimposed on chronic kidney disease stage 3  Assessment & Plan  · Baseline creatinine of approximately 1 0-1 2 -> improved today at 0 96 from a 1 49 peak  · Noted to be retaining urine on 6/15 s/p straight catheterization x 1 -> since then, has been voiding without difficulty  · C/w retention protocol as necessary  · S/p previous IV fluid trial  · Monitor renal function and urine output -> limit/avoid nephrotoxins and hypotension as possible -> can resume ARB (Cozaar) tomorrow    Essential hypertension  Assessment & Plan  · Continue Cardizem - initially held but plan to resume Cozaar in the setting of acute kidney injury (now resolved)    Morbid obesity ([facility] [address] )  Assessment & Plan  · BMI of 39 27 currently  · Lifestyle/diet modifications    MEG (obstructive sleep apnea)  Assessment & Plan  · C/w CPAP QHS  · Encourage weight loss    Insulin-dependent diabetes mellitus  Assessment & Plan  Lab Results   Component Value Date    HGBA1C 8 4 (H) 03/20/2023     · Continue basal/prandial insulin with additional SSI coverage prior checks  · Carbohydrate restricted diet  · Hypoglycemia protocol    Insomnia  Assessment & Plan  · Continue Trazodone QHS     Bicytopenia  Assessment & Plan  · Concurrent anemia and thrombocytopenia  · Monitor CBC transfuse if necessary      DVT Prophylaxis:  Lovenox       Patient Centered Rounds:  I have performed bedside rounds and discussed plan of care with nursing today  Discussions with Specialists or Other Care Team Provider:  see above assessments if applicable    Education and Discussions with Family / Patient:  Patient, along with wife/son, at bedside today    Time Spent for Care:  32 minutes  More than 50% of total time spent on counseling and coordination of care, on one or more of the following: performing physical exam; counseling and coordination of care, obtaining or reviewing history, documenting in the medical record, reviewing/ordering tests/medications/procedures, and communicating with other healthcare professionals  Current Length of Stay: 7 day(s)  Current Patient Status: Inpatient   Certification Statement:  Patient will continue to require additional hospital stay due to assessments as noted above  Code Status: Level 1 - Full Code        Subjective:     Encountered earlier in the day  Remains in relatively pleasant spirits  Denies fever/chills this time  States left shoulder pain and is waxing and waning, but generally improved  Objective:     Vitals:   Temp (24hrs), Av 3 °F (36 8 °C), Min:97 7 °F (36 5 °C), Max:98 9 °F (37 2 °C)    Temp:  [97 7 °F (36 5 °C)-98 9 °F (37 2 °C)] 97 7 °F (36 5 °C)  HR:  [74-97] 88  Resp:  [16-18] 18  BP: (125-137)/(54-73) 130/67  SpO2:  [94 %-98 %] 98 %  Body mass index is 39 27 kg/m²  Input and Output Summary (last 24 hours):        Intake/Output Summary (Last 24 hours) at 2023 1218  Last data filed at 2023 1129  Gross per 24 hour   Intake 530 ml   Output 25 ml   Net 505 ml       Physical Exam:     GENERAL  obese -waxing/waning but generally improved distress from pain   HEAD   Normocephalic - atraumatic   EYES   PERRL - EOMI    MOUTH   Mucosa moist   NECK   Supple - full range of motion   CARDIAC  rate controlled - S1/S2 positive   PULMONARY  clear bilateral breath sounds - nonlabored respirations   ABDOMEN   Soft - nontender/nondistended - active bowel sounds   MUSCULOSKELETAL   Motor strength/range of motion limited of the left shoulder with LUE immobilizer sling in place - bilateral distal LE edema   NEUROLOGIC   Alert/oriented at baseline - Hemovac drain in place on left shoulder   PSYCHIATRIC   Mood/affect stable         Additional Data:     Labs & Recent Cultures:    Results from last 7 days   Lab Units 23  0517   WBC Thousand/uL 2 77*   HEMOGLOBIN g/dL 9 3*   HEMATOCRIT % 29 3*   PLATELETS Thousands/uL 82*   NEUTROS PCT % 60   LYMPHS PCT % 19   MONOS PCT % 13*   EOS PCT % 7*     Results from last 7 days   Lab Units 23  0517 23  0518 23  0459   POTASSIUM mmol/L 4 2   < > 4 0   CHLORIDE mmol/L 107   < > 112*   CO2 mmol/L 23   < > 22   BUN mg/dL 20   < > 25   CREATININE mg/dL 0 96   < > 1 04   CALCIUM mg/dL 8 7   < > 9 2   ALK PHOS U/L  --   --  162*   ALT U/L  --   --  41   AST U/L  --   --  38    < > = values in this interval not displayed       Results from last 7 days   Lab Units 06/12/23  1703   INR  1 08     Results from last 7 days   Lab Units 06/18/23  0643 06/17/23  2100 06/17/23  1659 06/17/23  1123 06/17/23  0757 06/16/23  2039 06/16/23  1714 06/16/23  1155 06/16/23  0645 06/15/23  2105 06/15/23  1652 06/15/23  1118   POC GLUCOSE mg/dl 184* 297* 249* 267* 244* 256* 271* 276* 204* 304* 335* 282*                 Results from last 7 days   Lab Units 06/13/23  1712 06/13/23  1708 06/13/23  1707 06/13/23  1651 06/13/23  1650 06/13/23  1649 06/11/23  1701   GRAM STAIN RESULT  2+ Polys  No Polys or Bacteria seen No Polys or Bacteria seen Rare Disintegrating polys  No bacteria seen 2+ Polys 1+ Polys  No bacteria seen  1+ Mononuclear Cells  No bacteria seen No Polys or Bacteria seen 3+ Polys*  1+ Gram variable rods*   BODY FLUID CULTURE, STERILE   --   --   --   --   --   --  1+ Growth of Cutibacterium avidum*         Lines/Drains:  Invasive Devices     Peripherally Inserted Central Catheter Line  Duration           PICC Line 53/03/48 Right Basilic 4 days                  Last 24 Hours Medication List:   Current Facility-Administered Medications   Medication Dose Route Frequency Provider Last Rate   • acetaminophen  650 mg Oral Q8H [facility] AMOR Borja     • DAPTOmycin  700 mg Intravenous Q24H Devaughn Espinoza MD Stopped (06/18/23 1129)   • diltiazem  180 mg Oral Daily Dain Roth MD     • enoxaparin  40 mg Subcutaneous Q24H [facility] AMOR Cobian     • HYDROmorphone  0 5 mg Intravenous Q6H PRN Dain Roth MD     • insulin glargine  25 Units Subcutaneous HS AMOR Mcbride     • insulin lispro  2-12 Units Subcutaneous 4x Daily (AC & HS) Alex Tavera PA-C     • insulin lispro  5 Units Subcutaneous TID With Meals AMOR Desai     • [START ON 6/19/2023] losartan  100 mg Oral Daily Maeve Watts MD     • oxyCODONE  10 mg Oral Q6H PRN Dain Roth MD     • oxyCODONE  5 mg Oral Q6H PRCATHLEEN Jama MD     • polyethylene glycol  17 g Oral Daily PRN Jeanette Jama MD     • senna-docusate sodium  2 tablet Oral HS AMOR Mcbride     • traZODone  50 mg Oral HS Jeanette Jama MD                      ** Please Note: This note is constructed using a voice recognition dictation system  An occasional wrong word/phrase or “sound-a-like” substitution may have been picked up by dictation device due to the inherent limitations of voice recognition software  Read the chart carefully and recognize, using reasonable context, where substitutions may have occurred  **

## 2023-06-18 NOTE — PLAN OF CARE
Problem: PAIN - ADULT  Goal: Verbalizes/displays adequate comfort level or baseline comfort level  Description: Interventions:  - Encourage patient to monitor pain and request assistance  - Assess pain using appropriate pain scale  - Administer analgesics based on type and severity of pain and evaluate response  - Implement non-pharmacological measures as appropriate and evaluate response  - Consider cultural and social influences on pain and pain management  - Notify physician/advanced practitioner if interventions unsuccessful or patient reports new pain  Outcome: Progressing     Problem: INFECTION - ADULT  Goal: Absence or prevention of progression during hospitalization  Description: INTERVENTIONS:  - Assess and monitor for signs and symptoms of infection  - Monitor lab/diagnostic results  - Monitor all insertion sites, i e  indwelling lines, tubes, and drains  - Monitor endotracheal if appropriate and nasal secretions for changes in amount and color  - Douds appropriate cooling/warming therapies per order  - Administer medications as ordered  - Instruct and encourage patient and family to use good hand hygiene technique  - Identify and instruct in appropriate isolation precautions for identified infection/condition  Outcome: Progressing  Goal: Absence of fever/infection during neutropenic period  Description: INTERVENTIONS:  - Monitor WBC    Outcome: Progressing     Problem: SAFETY ADULT  Goal: Patient will remain free of falls  Description: INTERVENTIONS:  - Educate patient/family on patient safety including physical limitations  - Instruct patient to call for assistance with activity   - Consult OT/PT to assist with strengthening/mobility   - Keep Call bell within reach  - Keep bed low and locked with side rails adjusted as appropriate  - Keep care items and personal belongings within reach  - Initiate and maintain comfort rounds  - Make Fall Risk Sign visible to staff  - Offer Toileting every 2 Hours, in advance of need  - Initiate/Maintain alarm  - Obtain necessary fall risk management equipment:   - Apply yellow socks and bracelet for high fall risk patients  - Consider moving patient to room near nurses station  Outcome: Progressing  Goal: Maintain or return to baseline ADL function  Description: INTERVENTIONS:  -  Assess patient's ability to carry out ADLs; assess patient's baseline for ADL function and identify physical deficits which impact ability to perform ADLs (bathing, care of mouth/teeth, toileting, grooming, dressing, etc )  - Assess/evaluate cause of self-care deficits   - Assess range of motion  - Assess patient's mobility; develop plan if impaired  - Assess patient's need for assistive devices and provide as appropriate  - Encourage maximum independence but intervene and supervise when necessary  - Involve family in performance of ADLs  - Assess for home care needs following discharge   - Consider OT consult to assist with ADL evaluation and planning for discharge  - Provide patient education as appropriate  Outcome: Progressing  Goal: Maintains/Returns to pre admission functional level  Description: INTERVENTIONS:  - Perform BMAT or MOVE assessment daily    - Set and communicate daily mobility goal to care team and patient/family/caregiver  - Collaborate with rehabilitation services on mobility goals if consulted  - Perform Range of Motion 3 times a day  - Reposition patient every 2 hours    - Dangle patient 3 times a day  - Stand patient 3 times a day  - Ambulate patient 3 times a day  - Out of bed to chair 3 times a day   - Out of bed for meals 3 times a day  - Out of bed for toileting  - Record patient progress and toleration of activity level   Outcome: Progressing     Problem: DISCHARGE PLANNING  Goal: Discharge to home or other facility with appropriate resources  Description: INTERVENTIONS:  - Identify barriers to discharge w/patient and caregiver  - Arrange for needed discharge resources and transportation as appropriate  - Identify discharge learning needs (meds, wound care, etc )  - Arrange for interpretive services to assist at discharge as needed  - Refer to Case Management Department for coordinating discharge planning if the patient needs post-hospital services based on physician/advanced practitioner order or complex needs related to functional status, cognitive ability, or social support system  Outcome: Progressing     Problem: Knowledge Deficit  Goal: Patient/family/caregiver demonstrates understanding of disease process, treatment plan, medications, and discharge instructions  Description: Complete learning assessment and assess knowledge base    Interventions:  - Provide teaching at level of understanding  - Provide teaching via preferred learning methods  Outcome: Progressing

## 2023-06-18 NOTE — PROGRESS NOTES
Progress Note - Orthopedics   Shree Cade 69 y o  male MRN: [id number]  [facility]      Subjective:    67 y o male  No acute events, no new complaints  Patient doing well  Pain well controlled       Labs:  0   Lab Value Date/Time    HCT 29 3 (L) 06/18/2023 0517    HCT 31 2 (L) 06/17/2023 0553    HCT 30 9 (L) 06/16/2023 0544    HGB 9 3 (L) 06/18/2023 0517    HGB 9 8 (L) 06/17/2023 0553    HGB 9 6 (L) 06/16/2023 0544    INR 1 08 06/12/2023 1703    WBC 2 77 (L) 06/18/2023 0517    WBC 3 43 (L) 06/17/2023 0553    WBC 4 46 06/16/2023 0544    ESR 85 (H) 06/11/2023 1438    CRP 27 0 (H) 06/11/2023 1438       Meds:    Current Facility-Administered Medications:   •  acetaminophen (TYLENOL) tablet 650 mg, 650 mg, Oral, Q8H [address], AMOR Gordillo, 650 mg at 06/18/23 1222  •  DAPTOmycin (CUBICIN) 700 mg in sodium chloride 0 9 % 50 mL IVPB, 700 mg, Intravenous, Q24H, Garrick Hadley MD, Stopped at 06/17/23 1102  •  diltiazem (CARDIZEM CD) 24 hr capsule 180 mg, 180 mg, Oral, Daily, Lionel Garcia MD, 180 mg at 06/17/23 0847  •  enoxaparin (LOVENOX) subcutaneous injection 40 mg, 40 mg, Subcutaneous, Q24H [address], AMOR Coiban, 40 mg at 06/17/23 0846  •  HYDROmorphone (DILAUDID) injection 0 5 mg, 0 5 mg, Intravenous, Q6H PRN, Lionel Garcia MD, 0 5 mg at 06/17/23 2253  •  insulin glargine (LANTUS) subcutaneous injection 25 Units 0 25 mL, 25 Units, Subcutaneous, HS, AMOR Mcbride, 25 Units at 06/17/23 2139  •  insulin lispro (HumaLOG) 100 units/mL subcutaneous injection 2-12 Units, 2-12 Units, Subcutaneous, 4x Daily (AC & HS), 6 Units at 06/17/23 2140 **AND** Fingerstick Glucose (POCT), , , 4x Daily AC and at bedtime, Beulah Castanon PA-C  •  insulin lispro (HumaLOG) 100 units/mL subcutaneous injection 5 Units, 5 Units, Subcutaneous, TID With Meals, AMOR Luevano, 5 Units at 06/17/23 1714  •  oxyCODONE (ROXICODONE) immediate release tablet 10 mg, 10 mg, Oral, Q6H PRN, Lionel Garcia MD, 10 mg at 06/18/23 0521  •  oxyCODONE (ROXICODONE) IR tablet 5 mg, 5 mg, Oral, Q6H PRN, Paulina Guerrero MD  •  polyethylene glycol (MIRALAX) packet 17 g, 17 g, Oral, Daily PRN, Paulina Guerrero MD, 17 g at 06/16/23 1234  •  senna-docusate sodium (SENOKOT S) 8 6-50 mg per tablet 2 tablet, 2 tablet, Oral, HS, AMOR Mcbride, 2 tablet at 06/17/23 2139  •  traZODone (DESYREL) tablet 50 mg, 50 mg, Oral, HS, Paulina Guerrero MD, 50 mg at 06/17/23 2139    Blood Culture:   Lab Results   Component Value Date    BLOODCX No Growth After 5 Days  09/13/2022    BLOODCX No Growth After 5 Days  09/13/2022       Wound Culture:   Lab Results   Component Value Date    WOUNDCULT 1+ Growth of Klebsiella oxytoca (A) 09/14/2022    WOUNDCULT 1+ Growth of 09/14/2022       Ins and Outs:  I/O last 24 hours: In: 65 [P O :480; IV Piggyback:50]  Out: 2739 [Urine:1200; Drains:25]          Physical:  Vitals:    06/18/23 0733   BP: 130/67   Pulse: 88   Resp: 18   Temp: 97 7 °F (36 5 °C)   SpO2: 98%     Musculoskeletal: left Upper Extremity  · Dressing c/d/i with sling in place   · HV drain in place with serosanguinous drainage  · TTP celina-incisionally  · Sensation intact to median/radial/ulnar nerve distributions  · Motor intact AIN/PIN/median/radial/ulnar nerve distributions  · 2+ radial pulse  · Digits warm and well perfused    Assessment:    67 y o male POD 5 L shoulder resection arthroplasty, insertion of antibiotic spacer   Patient doing well with final cultures showing cutibacterium avidum     Plan:  · NWB LUE in sling   · Long term antibiotics per ID in the form of Daptomycin   · Drain pulled today   · PT/OT  · Pain control  · DVT ppx - per primary  · Dispo: Ortho will follow     Allison Hodgkins, MD

## 2023-06-18 NOTE — ASSESSMENT & PLAN NOTE
· Continue Cardizem - initially held but plan to resume Cozaar in the setting of acute kidney injury (now resolved)

## 2023-06-19 VITALS
BODY MASS INDEX: 36.64 KG/M2 | WEIGHT: 261.69 LBS | TEMPERATURE: 98.5 F | OXYGEN SATURATION: 99 % | HEART RATE: 88 BPM | RESPIRATION RATE: 18 BRPM | DIASTOLIC BLOOD PRESSURE: 68 MMHG | HEIGHT: 71 IN | SYSTOLIC BLOOD PRESSURE: 148 MMHG

## 2023-06-19 PROBLEM — N17.9 ACUTE KIDNEY INJURY SUPERIMPOSED ON CHRONIC KIDNEY DISEASE (HCC): Status: RESOLVED | Noted: 2023-06-15 | Resolved: 2023-06-19

## 2023-06-19 PROBLEM — N18.9 ACUTE KIDNEY INJURY SUPERIMPOSED ON CHRONIC KIDNEY DISEASE (HCC): Status: RESOLVED | Noted: 2023-06-15 | Resolved: 2023-06-19

## 2023-06-19 LAB
ANION GAP SERPL CALCULATED.3IONS-SCNC: 4 MMOL/L (ref 4–13)
BASOPHILS # BLD AUTO: 0.03 THOUSANDS/ÂΜL (ref 0–0.1)
BASOPHILS NFR BLD AUTO: 1 % (ref 0–1)
BUN SERPL-MCNC: 16 MG/DL (ref 5–25)
CALCIUM SERPL-MCNC: 8.9 MG/DL (ref 8.3–10.1)
CHLORIDE SERPL-SCNC: 107 MMOL/L (ref 96–108)
CO2 SERPL-SCNC: 24 MMOL/L (ref 21–32)
CREAT SERPL-MCNC: 0.94 MG/DL (ref 0.6–1.3)
EOSINOPHIL # BLD AUTO: 0.17 THOUSAND/ÂΜL (ref 0–0.61)
EOSINOPHIL NFR BLD AUTO: 5 % (ref 0–6)
ERYTHROCYTE [DISTWIDTH] IN BLOOD BY AUTOMATED COUNT: 15.9 % (ref 11.6–15.1)
GFR SERPL CREATININE-BSD FRML MDRD: 82 ML/MIN/1.73SQ M
GLUCOSE SERPL-MCNC: 178 MG/DL (ref 65–140)
GLUCOSE SERPL-MCNC: 198 MG/DL (ref 65–140)
GLUCOSE SERPL-MCNC: 259 MG/DL (ref 65–140)
GLUCOSE SERPL-MCNC: 283 MG/DL (ref 65–140)
HCT VFR BLD AUTO: 30.3 % (ref 36.5–49.3)
HGB BLD-MCNC: 9.6 G/DL (ref 12–17)
IMM GRANULOCYTES # BLD AUTO: 0.02 THOUSAND/UL (ref 0–0.2)
IMM GRANULOCYTES NFR BLD AUTO: 1 % (ref 0–2)
LYMPHOCYTES # BLD AUTO: 0.6 THOUSANDS/ÂΜL (ref 0.6–4.47)
LYMPHOCYTES NFR BLD AUTO: 18 % (ref 14–44)
MCH RBC QN AUTO: 27.6 PG (ref 26.8–34.3)
MCHC RBC AUTO-ENTMCNC: 31.7 G/DL (ref 31.4–37.4)
MCV RBC AUTO: 87 FL (ref 82–98)
MONOCYTES # BLD AUTO: 0.37 THOUSAND/ÂΜL (ref 0.17–1.22)
MONOCYTES NFR BLD AUTO: 11 % (ref 4–12)
NEUTROPHILS # BLD AUTO: 2.11 THOUSANDS/ÂΜL (ref 1.85–7.62)
NEUTS SEG NFR BLD AUTO: 64 % (ref 43–75)
NRBC BLD AUTO-RTO: 0 /100 WBCS
PLATELET # BLD AUTO: 91 THOUSANDS/UL (ref 149–390)
PMV BLD AUTO: 12.7 FL (ref 8.9–12.7)
POTASSIUM SERPL-SCNC: 4.4 MMOL/L (ref 3.5–5.3)
RBC # BLD AUTO: 3.48 MILLION/UL (ref 3.88–5.62)
SODIUM SERPL-SCNC: 135 MMOL/L (ref 135–147)
WBC # BLD AUTO: 3.3 THOUSAND/UL (ref 4.31–10.16)

## 2023-06-19 PROCEDURE — 80048 BASIC METABOLIC PNL TOTAL CA: CPT | Performed by: INTERNAL MEDICINE

## 2023-06-19 PROCEDURE — 85025 COMPLETE CBC W/AUTO DIFF WBC: CPT | Performed by: INTERNAL MEDICINE

## 2023-06-19 PROCEDURE — 99232 SBSQ HOSP IP/OBS MODERATE 35: CPT | Performed by: STUDENT IN AN ORGANIZED HEALTH CARE EDUCATION/TRAINING PROGRAM

## 2023-06-19 PROCEDURE — 99239 HOSP IP/OBS DSCHRG MGMT >30: CPT | Performed by: PHYSICIAN ASSISTANT

## 2023-06-19 PROCEDURE — 82948 REAGENT STRIP/BLOOD GLUCOSE: CPT

## 2023-06-19 PROCEDURE — NC001 PR NO CHARGE: Performed by: ORTHOPAEDIC SURGERY

## 2023-06-19 RX ORDER — OXYCODONE HYDROCHLORIDE 10 MG/1
10 TABLET ORAL EVERY 6 HOURS PRN
Qty: 20 TABLET | Refills: 0 | Status: SHIPPED | OUTPATIENT
Start: 2023-06-19 | End: 2023-06-29

## 2023-06-19 RX ORDER — TRIAMTERENE AND HYDROCHLOROTHIAZIDE 37.5; 25 MG/1; MG/1
1 TABLET ORAL DAILY
Status: DISCONTINUED | OUTPATIENT
Start: 2023-06-19 | End: 2023-06-19 | Stop reason: HOSPADM

## 2023-06-19 RX ADMIN — TRIAMTERENE AND HYDROCHLOROTHIAZIDE 1 TABLET: 37.5; 25 TABLET ORAL at 08:17

## 2023-06-19 RX ADMIN — INSULIN LISPRO 5 UNITS: 100 INJECTION, SOLUTION INTRAVENOUS; SUBCUTANEOUS at 12:14

## 2023-06-19 RX ADMIN — ENOXAPARIN SODIUM 40 MG: 40 INJECTION SUBCUTANEOUS at 08:17

## 2023-06-19 RX ADMIN — ACETAMINOPHEN 650 MG: 325 TABLET, FILM COATED ORAL at 05:02

## 2023-06-19 RX ADMIN — OXYCODONE HYDROCHLORIDE 10 MG: 10 TABLET ORAL at 06:14

## 2023-06-19 RX ADMIN — DILTIAZEM HYDROCHLORIDE 180 MG: 180 CAPSULE, COATED, EXTENDED RELEASE ORAL at 08:17

## 2023-06-19 RX ADMIN — DAPTOMYCIN 700 MG: 500 INJECTION, POWDER, LYOPHILIZED, FOR SOLUTION INTRAVENOUS at 10:35

## 2023-06-19 RX ADMIN — LOSARTAN POTASSIUM 100 MG: 50 TABLET, FILM COATED ORAL at 08:17

## 2023-06-19 RX ADMIN — ACETAMINOPHEN 650 MG: 325 TABLET, FILM COATED ORAL at 12:20

## 2023-06-19 RX ADMIN — INSULIN LISPRO 6 UNITS: 100 INJECTION, SOLUTION INTRAVENOUS; SUBCUTANEOUS at 12:13

## 2023-06-19 RX ADMIN — INSULIN LISPRO 2 UNITS: 100 INJECTION, SOLUTION INTRAVENOUS; SUBCUTANEOUS at 08:11

## 2023-06-19 RX ADMIN — INSULIN LISPRO 5 UNITS: 100 INJECTION, SOLUTION INTRAVENOUS; SUBCUTANEOUS at 08:12

## 2023-06-19 NOTE — PROGRESS NOTES
Progress Note - Orthopedics   Nils Mcmahan 69 y o  male MRN: [id number]  Unit/Bed#: [facility] 625-01      Subjective:    69 y o male POD 6 left shoulder resection arthroplasty, insertion antibiotic spacer  No acute events, no new complaints  Patient doing well  Pain well controlled       Labs:  0   Lab Value Date/Time    HCT 29 3 (L) 06/18/2023 0517    HCT 31 2 (L) 06/17/2023 0553    HCT 30 9 (L) 06/16/2023 0544    HGB 9 3 (L) 06/18/2023 0517    HGB 9 8 (L) 06/17/2023 0553    HGB 9 6 (L) 06/16/2023 0544    INR 1 08 06/12/2023 1703    WBC 2 77 (L) 06/18/2023 0517    WBC 3 43 (L) 06/17/2023 0553    WBC 4 46 06/16/2023 0544    ESR 85 (H) 06/11/2023 1438    CRP 27 0 (H) 06/11/2023 1438       Meds:    Current Facility-Administered Medications:   •  acetaminophen (TYLENOL) tablet 650 mg, 650 mg, Oral, Q8H [facility], AMOR Gordillo, 650 mg at 06/19/23 0502  •  DAPTOmycin (CUBICIN) 700 mg in sodium chloride 0 9 % 50 mL IVPB, 700 mg, Intravenous, Q24H, Edgardo Carrillo MD, Stopped at 06/18/23 1129  •  diltiazem (CARDIZEM CD) 24 hr capsule 180 mg, 180 mg, Oral, Daily, Lissette Burnham MD, 180 mg at 06/18/23 0830  •  enoxaparin (LOVENOX) subcutaneous injection 40 mg, 40 mg, Subcutaneous, Q24H [facility], AMOR Cobian, 40 mg at 06/18/23 0830  •  HYDROmorphone (DILAUDID) injection 0 5 mg, 0 5 mg, Intravenous, Q6H PRN, Lissette Burnham MD, 0 5 mg at 06/18/23 2058  •  insulin glargine (LANTUS) subcutaneous injection 25 Units 0 25 mL, 25 Units, Subcutaneous, HS, AMOR Mcbride, 25 Units at 06/18/23 2109  •  insulin lispro (HumaLOG) 100 units/mL subcutaneous injection 2-12 Units, 2-12 Units, Subcutaneous, 4x Daily (AC & HS), 4 Units at 06/18/23 2110 **AND** Fingerstick Glucose (POCT), , , 4x Daily AC and at bedtime, Kehinde Shukla PA-C  •  insulin lispro (HumaLOG) 100 units/mL subcutaneous injection 5 Units, 5 Units, Subcutaneous, TID With Meals, AMOR Denton, 5 Units at 06/18/23 1736  •  losartan (COZAAR) tablet 100 mg, 100 mg, Oral, Daily, Falguni Alcantara MD  •  oxyCODONE (ROXICODONE) immediate release tablet 10 mg, 10 mg, Oral, Q6H PRN, Yumi Lux MD, 10 mg at 06/18/23 1736  •  oxyCODONE (ROXICODONE) IR tablet 5 mg, 5 mg, Oral, Q6H PRN, Yumi Lux MD  •  polyethylene glycol (MIRALAX) packet 17 g, 17 g, Oral, Daily PRN, Yumi Lux MD, 17 g at 06/16/23 1234  •  senna-docusate sodium (SENOKOT S) 8 6-50 mg per tablet 2 tablet, 2 tablet, Oral, HS, AMOR Mcbride, 2 tablet at 06/18/23 2100  •  traZODone (DESYREL) tablet 50 mg, 50 mg, Oral, HS, Yumi Lux MD, 50 mg at 06/18/23 2100    Blood Culture:   Lab Results   Component Value Date    BLOODCX No Growth After 5 Days  09/13/2022    BLOODCX No Growth After 5 Days  09/13/2022       Wound Culture:   Lab Results   Component Value Date    WOUNDCULT 1+ Growth of Klebsiella oxytoca (A) 09/14/2022    WOUNDCULT 1+ Growth of 09/14/2022       Ins and Outs:  I/O last 24 hours: In: 1250 [P O :1200; IV Piggyback:50]  Out: 25 [Drains:25]          Physical:  Vitals:    06/19/23 0505   BP: 154/70   Pulse: 87   Resp:    Temp:    SpO2: 100%     Musculoskeletal: left Upper Extremity  · Dressing c/d/i  · TTP celina-incisionally  · Sensation intact to median/radial/ulnar nerve distribution   · Motor intact anterior interosseous nerve/posterior interosseous nerve/median/radial/ulnar nerve distributions  · 2+ radial pulse, symmetric bilaterally  · Digits warm and well perfused  · Capillary refill < 2 seconds    Assessment:    67 y o male POD 6 left shoulder resection arthroplasty, insertion antibiotic spacer  Patient doing well  Plan for DC on 6 weeks IV daptomycin, pending CM coordination       Plan:  · NWB LUE in sling  · 6 weeks daptomycin per ID and primary team  · Will monitor for ABLA and administer IVF/prbc as indicated for Greater than 2 gram drop or Hgb < 7  · PT/OT  · Pain control  · DVT ppx - per primary  · Dispo: Ortho will follow    Liz Hinojosa MD

## 2023-06-19 NOTE — ASSESSMENT & PLAN NOTE
Patient here with hx of left shoulder replacement 8/6634 complicated by site wound infection requiring admission from May 1 to May 5 status post I&D with orthopedics on 5/2  Evaluated by ID at this time  Presented back to the ED 6/11 with noted swelling and erythema of the surgical wound site, aspiration performed, cultures from 6/11 growing Cutibacterium avidum  · CT LUE without contrast shows Localized fluid collection in the anterior aspect of left shoulder measuring 5 8 x 4 4 x 5 1 cm, which probably communicates with the glenohumeral joint  There are small foci of air within the collection, which may be related to superinfection or sequela of prior I&D procedure    · Ortho following, s/p left shoulder resection revision/arthroplasty and insertion of antibiotic spacer 6/13  · Hemovac in place  · ID following -> transitioned antibiotic regimen to IV Daptomycin for a 6-week course through 7/28 (will require weekly CBC and CPK level checks)   · Operative cultures growing staph coccus epidermidis (in broth only) and Cutibacterium avidum  · Currently nonweightbearing of the left upper extremity and immobilizer sling  · PT recommending outpatient rehab - no needs per OT  · Plan for discharge once necessary equipment decided upon and delivered home, along with orthopedic clearance

## 2023-06-19 NOTE — DISCHARGE INSTR - AVS FIRST PAGE
Discharge Instructions - Orthopedics  Diamond Lopez 69 y o  male MRN: [id number]  Unit/Bed#: [facility] 625-01    Weight Bearing Status:                                           Non weightbearing left upper extremity in sling      Pain:  Continue analgesics as directed    Dressing Instructions:   Please keep clean, dry and intact until follow up     Appt Instructions: If you do not have your appointment, please call the clinic at [phone] t  Otherwise followup as scheduled     Contact the office sooner if you experience any increased numbness/tingling in the extremities        Miscellaneous:  Please follow up with Dr Matti Saba in 2 weeks

## 2023-06-19 NOTE — CASE MANAGEMENT
Case Management Discharge Planning Note    Patient name Winnie Torres  Location [facility] 625/[facility] 948-52 MRN [id number]  : 1955 Date 2023       Current Admission Date: 2023  Current Admission Diagnosis:Postoperative infection of surgical wound   Patient Active Problem List    Diagnosis Date Noted   • Acute kidney injury superimposed on chronic kidney disease stage 3 06/15/2023   • Murmur 06/15/2023   • Postoperative infection of surgical wound 2023   • Ulcer of left foot, limited to breakdown of skin ([facility] [address] ) 2023   • Aftercare following left shoulder joint replacement surgery 2023   • S/P reverse total shoulder arthroplasty, left 2023   • Obesity, morbid ([facility] [address] ) 2022   • Post-traumatic osteoarthritis of left shoulder 2022   • Morbid obesity ([facility] [address] ) 2022   • Cellulitis 2022   • Diabetic ulcer of left foot associated with type 2 diabetes mellitus ([facility] [address] ) 2022   • Leukopenia 2022   • Insomnia 2020   • Elevated troponin 2020   • Chest pressure 2020   • Alcohol abuse 2020   • Insulin-dependent diabetes mellitus 2020   • Essential hypertension 2020   • ABILIO (acute kidney injury) ([facility] [address] ) 2020   • Cholelithiasis 15/79/6944   • [facility]  )    • MEG (obstructive sleep apnea) 2020   • Bicytopenia 2020   • Thrombocytopenia ([facility] ) 2020      LOS (days): 8  Geometric Mean LOS (GMLOS) (days): 3 60  Days to GMLOS:-4 1     OBJECTIVE:  Risk of Unplanned Readmission Score: 14 66         Current admission status: Inpatient   Preferred Pharmacy:   [address] [place] [address], [address] [place] [address]  Phone: [phone] Fax: [phone]    Primary Care Provider: Sanjay Gibbs MD    Primary Insurance: MEDICARE  Secondary Insurance: [employer] [employer] [facility]    DISCHARGE DETAILS:    Pt accepted by [facility]  [facility]    CM informed "that \" patient has a drug copay of $78 83/week  Supply charge is $35/day  Total through 7/28 would be $1804  Half ($900) would be asked for upfront\"    CM will inform patient of this financial responsibility   CM also checked with [facility] about availability for 1st dose     "

## 2023-06-19 NOTE — CASE MANAGEMENT
Case Management Discharge Planning Note    Patient name Yvonne Pena  [facility] 662-37 MRN [id number]  : 1955 Date 2023       Current Admission Date: 2023  Current Admission Diagnosis:Postoperative infection of surgical wound   Patient Active Problem List    Diagnosis Date Noted   • Acute kidney injury superimposed on chronic kidney disease stage 3 06/15/2023   • Murmur 06/15/2023   • Postoperative infection of surgical wound 2023   • Ulcer of left foot, limited to breakdown of skin ([facility] [address] ) 2023   • Aftercare following left shoulder joint replacement surgery 2023   • S/P reverse total shoulder arthroplasty, left 2023   • Obesity, morbid ([facility] [address] ) 2022   • Post-traumatic osteoarthritis of left shoulder 2022   • Morbid obesity ([facility] [address] ) 2022   • Cellulitis 2022   • Diabetic ulcer of left foot associated with type 2 diabetes mellitus ([facility] [address] ) 2022   • Leukopenia 2022   • Insomnia 2020   • Elevated troponin 2020   • Chest pressure 2020   • Alcohol abuse 2020   • Insulin-dependent diabetes mellitus 2020   • Essential hypertension 2020   • ABILIO (acute kidney injury) ([facility] [address] ) 2020   • Cholelithiasis    • [facility]    • MEG (obstructive sleep apnea) 2020   • Bicytopenia 2020   • Thrombocytopenia ([address] ) 2020      LOS (days): 8  Geometric Mean LOS (GMLOS) (days): 3 60  Days to GMLOS:-4 2     OBJECTIVE:  Risk of Unplanned Readmission Score: 14 66         Current admission status: Inpatient   Preferred Pharmacy:   [address],  [address]  [address] [facility] [address]  Phone: [phone] Fax: [phone]    Primary Care Provider: Erasmo Christy MD    Primary Insurance: MEDICARE  Secondary Insurance: [employer] [address]:         [address]         Is the patient interested in [facility] at discharge?: Yes  Home Health Discipline requested[de-identified] [place] [address] Agency Name[de-identified] [facility] Provider[de-identified] PCP  Home Health Services Needed[de-identified] Administration of IV, IM or SC Medications  Homebound Criteria Met[de-identified] Requires the Assistance of Another Person for Safe Ambulation or to Leave the Home  Supporting Clincal Findings[de-identified] Limited Endurance    DME Referral Provided  Referral made for DME?: No    Other Referral/Resources/Interventions Provided:  Interventions: HHC, Home Infusion    Treatment Team Recommendation: Home with [address] [employer]  Discharge Destination Plan[de-identified] Home with [address] [employer]      Pt in agreement with price of medications  [facility] will meet pt for first session/dose tomorrow @1000  Awaiting [facility] infusion to connect with patient for payment/delivery of medications     Pt likely to d/c home today

## 2023-06-19 NOTE — CASE MANAGEMENT
Case Management Discharge Planning Note    Patient name Silvia Huynh  Location [facility] 625/[facility] 427-50 MRN [id number]  : 1955 Date 2023       Current Admission Date: 2023  Current Admission Diagnosis:Postoperative infection of surgical wound   Patient Active Problem List    Diagnosis Date Noted   • Murmur 06/15/2023   • Postoperative infection of surgical wound 2023   • Ulcer of left foot, limited to breakdown of skin ([facility] [address] ) 2023   • Aftercare following left shoulder joint replacement surgery 2023   • S/P reverse total shoulder arthroplasty, left 2023   • Obesity, morbid ([address] ) 2022   • Post-traumatic osteoarthritis of left shoulder 2022   • Morbid obesity ([facility] [address] ) 2022   • Cellulitis 2022   • Diabetic ulcer of left foot associated with type 2 diabetes mellitus ([facility] [address] ) 2022   • Leukopenia 2022   • Insomnia 2020   • Elevated troponin 2020   • Chest pressure 2020   • Alcohol abuse 2020   • Insulin-dependent diabetes mellitus 2020   • Essential hypertension 2020   • ABILIO (acute kidney injury) ([facility] [address] ) 2020   • Cholelithiasis    • Alcoholic cirrhosis ([facility] [address] )    • MEG (obstructive sleep apnea) 2020   • Bicytopenia 2020   • Thrombocytopenia ([facility] [address] ) 2020      LOS (days): 8  Geometric Mean LOS (GMLOS) (days): 3 60  Days to GMLOS:-4 3     OBJECTIVE:  Risk of Unplanned Readmission Score: 14 54         Current admission status: Inpatient   Preferred Pharmacy:   [address] [place] [address], [address] [place] [address] Rd  [address] [facility] [address]  Phone: [phone] Fax: [phone]    Primary Care Provider: Mahesh Kellogg MD    Primary Insurance: MEDICARE  Secondary Insurance: Sarath Lopez    DISCHARGE DETAILS:    Home infusion will deliver pt's medications @1430 to pt's room  [facility] will see the pt tomorrow @1000 for initial dose  Pt in agreement and has transportation home today

## 2023-06-19 NOTE — QUICK NOTE
Resumed pts Dyazide (maxide) at pts request dt increasing edema  · SBP stable and renal function - stable   · Would monitor renal function and sbp

## 2023-06-19 NOTE — DISCHARGE SUMMARY
[address] [facility]  Discharge- Shree Cade 1955, 69 y o  male MRN: [id number]  Unit/Bed#: [facility] 625-01 Encounter: [id number]  Primary Care Provider: Manohar Wilson MD   Date and time admitted to hospital: 6/11/2023 11:06 AM    * Postoperative infection of surgical wound  Assessment & Plan  Patient here with hx of left shoulder replacement 4/2904 complicated by site wound infection requiring admission from May 1 to May 5 status post I&D with orthopedics on 5/2  Evaluated by ID at this time  Presented back to the ED 6/11 with noted swelling and erythema of the surgical wound site, aspiration performed, cultures from 6/11 growing Cutibacterium avidum  · CT LUE without contrast shows Localized fluid collection in the anterior aspect of left shoulder measuring 5 8 x 4 4 x 5 1 cm, which probably communicates with the glenohumeral joint  There are small foci of air within the collection, which may be related to superinfection or sequela of prior I&D procedure    · Ortho following, s/p left shoulder resection revision/arthroplasty and insertion of antibiotic spacer 6/13  · Hemovac in place  · ID following -> transitioned antibiotic regimen to IV Daptomycin for a 6-week course through 7/28 (will require weekly CBC and CPK level checks)   · Operative cultures growing staph coccus epidermidis (in broth only) and Cutibacterium avidum  · Currently nonweightbearing of the left upper extremity and immobilizer sling  · PT recommending outpatient rehab - no needs per OT  · Plan for discharge once necessary equipment decided upon and delivered home, along with orthopedic clearance    Acute kidney injury superimposed on chronic kidney disease stage 3  Assessment & Plan  · Resolved now, creatinine currently within baseline of approximately 1 0-1 2   · Noted to be retaining urine on 6/15 s/p straight catheterization x 1 -> since then, has been voiding without difficulty  · C/w retention protocol as necessary  · S/p previous IV fluid trial  · Monitor renal function and urine output  · Limit/avoid nephrotoxins and hypotension     Insulin-dependent diabetes mellitus  Assessment & Plan  Lab Results   Component Value Date    HGBA1C 8 4 (H) 03/20/2023     · Continue basal/prandial insulin with additional SSI coverage prior checks  · Carbohydrate restricted diet  · Hypoglycemia protocol    Essential hypertension  Assessment & Plan  · BP acceptable, monitor routinely   · Continue PTA Cardizem and resume Cozaar on discharge     Morbid obesity ([address] )  Assessment & Plan  · BMI of 39 27 currently  · Lifestyle/diet modifications    Insomnia  Assessment & Plan  · Continue Trazodone QHS     Bicytopenia  Assessment & Plan  · Concurrent anemia and thrombocytopenia  · Monitor CBC transfuse if necessary    MEG (obstructive sleep apnea)  Assessment & Plan  · C/w CPAP QHS  · Encourage weight loss      Medical Problems     Resolved Problems  Date Reviewed: 6/19/2023          Resolved    Acute kidney injury superimposed on chronic kidney disease stage 3 6/19/2023     Resolved by  Bharati Martinez PA-C        Discharging Physician / Practitioner: Bharati Martinez PA-C  PCP: Quirino Martinez MD  Admission Date:   Admission Orders (From admission, onward)     Ordered        06/11/23 1710  INPATIENT ADMISSION  Once                      Discharge Date: 06/19/23    Consultations During Hospital Stay:  · Orthopedic surgery   · ID   · APS    Procedures Performed:   · Left shoulder resection revision/arthroplasty and insertion of antibiotic spacer 6/13  · PICC placement 6/14    Significant Findings / Test Results:   · Outlined above     Incidental Findings:   · None      Test Results Pending at Discharge (will require follow up):    · None      Outpatient Tests Requested:  · Weekly CBC and CPK levels per ID   · Removal of PICC after last dose IV antibiotic     Complications:  None     Reason for Admission: postop infection of surgical wound     Hospital "Course:   Narayan Torres is a 69 y o  male patient who originally presented to the hospital on 6/11/2023 due to swelling and erythema of left shoulder in the setting of recent surgical site infection  CT imaging concerning for abscess as outlined above  Patient seen in consultation by orthopedics and infectious disease  He underwent left shoulder resection revision/arthroplasty and insertion of antibiotic spacer 6/13  Infectious disease recommending 6 weeks IV antibiotic  PICC line placed and patient discharged home with  VNA services  Pain controlled with oxycodone at this time, provided #20 tablets on discharge  Patient advised to follow-up with PCP within 1 week  Therapy cleared for outpatient rehabilitation  Please see above list of diagnoses and related plan for additional information  Condition at Discharge: good    Discharge Day Visit / Exam:   Subjective:  Patient offers no new complaints today  Expresses frustration that he had to stay in the hospital all weekend because VNA services were not arranged  Noah Jaroso for discharge today  Vitals: Blood Pressure: 148/68 (06/19/23 5563)  Pulse: 88 (06/19/23 0632)  Temperature: 98 5 °F (36 9 °C) (06/19/23 0632)  Temp Source: Skin (06/13/23 1910)  Respirations: 18 (06/19/23 7306)  Height: 5' 11\" (180 3 cm) (06/11/23 0829)  Weight - Scale: 119 kg (261 lb 11 oz) (06/19/23 0453)  SpO2: 99 % (06/19/23 8830)  Exam:   Physical Exam  Vitals and nursing note reviewed  Constitutional:       Appearance: He is obese  Cardiovascular:      Rate and Rhythm: Normal rate  Pulmonary:      Effort: Pulmonary effort is normal  No respiratory distress  Abdominal:      General: There is no distension  Neurological:      General: No focal deficit present  Mental Status: He is alert and oriented to person, place, and time  Mental status is at baseline  Discussion with Family: Patient declined call to        Discharge instructions/Information to " patient and family:   See after visit summary for information provided to patient and family  Provisions for Follow-Up Care:  See after visit summary for information related to follow-up care and any pertinent home health orders  Disposition:   Home with VNA Services (Reminder: Complete face to face encounter)    Planned Readmission: no     Discharge Statement:  I spent 35 minutes discharging the patient  This time was spent on the day of discharge  I had direct contact with the patient on the day of discharge  Greater than 50% of the total time was spent examining patient, answering all patient questions, arranging and discussing plan of care with patient as well as directly providing post-discharge instructions  Additional time then spent on discharge activities  Discharge Medications:  See after visit summary for reconciled discharge medications provided to patient and/or family        **Please Note: This note may have been constructed using a voice recognition system**

## 2023-06-19 NOTE — PLAN OF CARE
Problem: PAIN - ADULT  Goal: Verbalizes/displays adequate comfort level or baseline comfort level  Description: Interventions:  - Encourage patient to monitor pain and request assistance  - Assess pain using appropriate pain scale  - Administer analgesics based on type and severity of pain and evaluate response  - Implement non-pharmacological measures as appropriate and evaluate response  - Consider cultural and social influences on pain and pain management  - Notify physician/advanced practitioner if interventions unsuccessful or patient reports new pain  6/19/2023 1437 by Kristine Munguia RN  Outcome: Completed  6/19/2023 0715 by Kristine Munguia RN  Outcome: Progressing     Problem: INFECTION - ADULT  Goal: Absence or prevention of progression during hospitalization  Description: INTERVENTIONS:  - Assess and monitor for signs and symptoms of infection  - Monitor lab/diagnostic results  - Monitor all insertion sites, i e  indwelling lines, tubes, and drains  - Monitor endotracheal if appropriate and nasal secretions for changes in amount and color  - [place] appropriate cooling/warming therapies per order  - Administer medications as ordered  - Instruct and encourage patient and family to use good hand hygiene technique  - Identify and instruct in appropriate isolation precautions for identified infection/condition  6/19/2023 1437 by Kristine Munguia RN  Outcome: Completed  6/19/2023 0715 by Kristine Munguia RN  Outcome: Progressing  Goal: Absence of fever/infection during neutropenic period  Description: INTERVENTIONS:  - Monitor WBC    6/19/2023 1437 by Kristine Munguia RN  Outcome: Completed  6/19/2023 0715 by Kirstine Munguia RN  Outcome: Progressing     Problem: SAFETY ADULT  Goal: Patient will remain free of falls  Description: INTERVENTIONS:  - Educate patient/family on patient safety including physical limitations  - Instruct patient to call for assistance with activity   - Consult OT/PT to assist with strengthening/mobility   - Keep Call bell within reach  - Keep bed low and locked with side rails adjusted as appropriate  - Keep care items and personal belongings within reach  - Initiate and maintain comfort rounds  - Make Fall Risk Sign visible to staff  - Offer Toileting every 2 Hours, in advance of need  - Initiate/Maintain alarm  - Obtain necessary fall risk management equipment:   - Apply yellow socks and bracelet for high fall risk patients  - Consider moving patient to room near nurses station  6/19/2023 1437 by Leroy Chavez RN  Outcome: Completed  6/19/2023 0715 by Leroy Chavez RN  Outcome: Progressing  Goal: Maintain or return to baseline ADL function  Description: INTERVENTIONS:  -  Assess patient's ability to carry out ADLs; assess patient's baseline for ADL function and identify physical deficits which impact ability to perform ADLs (bathing, care of mouth/teeth, toileting, grooming, dressing, etc )  - Assess/evaluate cause of self-care deficits   - Assess range of motion  - Assess patient's mobility; develop plan if impaired  - Assess patient's need for assistive devices and provide as appropriate  - Encourage maximum independence but intervene and supervise when necessary  - Involve family in performance of ADLs  - Assess for home care needs following discharge   - Consider OT consult to assist with ADL evaluation and planning for discharge  - Provide patient education as appropriate  6/19/2023 1437 by Leroy Chavez RN  Outcome: Completed  6/19/2023 0715 by Leroy Chavez RN  Outcome: Progressing  Goal: Maintains/Returns to pre admission functional level  Description: INTERVENTIONS:  - Perform BMAT or MOVE assessment daily    - Set and communicate daily mobility goal to care team and patient/family/caregiver  - Collaborate with rehabilitation services on mobility goals if consulted  - Perform Range of Motion 3 times a day  - Reposition patient every 2 hours    - Dangle patient 3 times a day  - Stand patient 3 times a day  - Ambulate patient 3 times a day  - Out of bed to chair 3 times a day   - Out of bed for meals 3 times a day  - Out of bed for toileting  - Record patient progress and toleration of activity level   6/19/2023 1437 by Nyla Gutiérrez RN  Outcome: Completed  6/19/2023 0715 by Nyla Gutiérrez RN  Outcome: Progressing     Problem: DISCHARGE PLANNING  Goal: Discharge to home or other facility with appropriate resources  Description: INTERVENTIONS:  - Identify barriers to discharge w/patient and caregiver  - Arrange for needed discharge resources and transportation as appropriate  - Identify discharge learning needs (meds, wound care, etc )  - Arrange for interpretive services to assist at discharge as needed  - Refer to Case Management Department for coordinating discharge planning if the patient needs post-hospital services based on physician/advanced practitioner order or complex needs related to functional status, cognitive ability, or social support system  6/19/2023 1437 by Nyla Gutiérrez RN  Outcome: Completed  6/19/2023 0715 by Nyla Gutiérrez RN  Outcome: Progressing     Problem: Knowledge Deficit  Goal: Patient/family/caregiver demonstrates understanding of disease process, treatment plan, medications, and discharge instructions  Description: Complete learning assessment and assess knowledge base    Interventions:  - Provide teaching at level of understanding  - Provide teaching via preferred learning methods  6/19/2023 1437 by Nyla Gutiérrez RN  Outcome: Completed  6/19/2023 0715 by Nyla Gutiérrez RN  Outcome: Progressing

## 2023-06-19 NOTE — ASSESSMENT & PLAN NOTE
· Resolved now, creatinine currently within baseline of approximately 1 0-1 2   · Noted to be retaining urine on 6/15 s/p straight catheterization x 1 -> since then, has been voiding without difficulty  · C/w retention protocol as necessary  · S/p previous IV fluid trial  · Monitor renal function and urine output  · Limit/avoid nephrotoxins and hypotension

## 2023-06-19 NOTE — PROGRESS NOTES
Progress Note - Infectious Disease   Toy Hunter 69 y o  male MRN: [id number]  Unit/Bed#: [facility] 625-01 Encounter: [id number]      Impression/Plan:  1  Left shoulder surgical site infection with abscess; possible prosthetic infection  • Status post reverse total shoulder arthroplasty done on 4/4/2023; status post incision and drainage of left shoulder on 5/2/2023 presenting with redness, swelling, tenderness associated with incision site  • CT left upper extremity:Localized fluid collection in the anterior aspect of left shoulder measuring 5 8 x 4 4 x 5 1 cm, which probably communicates with the glenohumeral joint  There are small foci of air within the collection, which may be related to superinfection or sequela of prior I&D procedure  • Gram stain from this admission with 3+ polys, 1+ gram variable rods  • Cultures from incision and drainage in 5/2/2023 showing cutibacterium acnes and cutibacterium avidum, also coagulase-negative staph and alphahemolytic strep in broth  • S/p explantation and spacer placement by ortho on 6/13/23; tissue cultures taken are growing cutibacterium avidum and Staphylococcus epidermidis     • Hemodynamically stable  • Patient is currently on daptomycin-day 3; will continue to be on antibiotics until 7/28/2023  • CM following for home set up, will be set up later this afternoon per CM  • Outpatient follow-up with orthopedics  • outPatient follow-up with infectious disease     2  DMII; poorly controlled  • A1c of 8 4  • Management as per primary     3  Obesity  • Nutritional counseling and weight loss counseling at discharge    Antibiotics:  Daptomycin-day 3, until 7/28/2023    Subjective:  Patient has no fever, chills, sweats; no nausea, vomiting, diarrhea; no cough, shortness of breath; no pain  No new symptoms  Discontinued report drainage from surgical site, however reports it is clear, occasionally bloody, denies any pus or puslike leakage      Objective:  Vitals:  Temp:  [97 9 °F (36 6 °C)-98 5 °F (36 9 °C)] 98 5 °F (36 9 °C)  HR:  [70-90] 88  Resp:  [18] 18  BP: (102-158)/(38-80) 148/68  SpO2:  [95 %-100 %] 99 %  Temp (24hrs), Av 2 °F (36 8 °C), Min:97 9 °F (36 6 °C), Max:98 5 °F (36 9 °C)  Current: Temperature: 98 5 °F (36 9 °C)    Physical Exam:   General Appearance:  Alert, interactive, nontoxic, no acute distress  Throat: Oropharynx moist without lesions  Lungs:   Clear to auscultation bilaterally; no wheezes, rhonchi or rales; respirations unlabored   Heart:  RRR; no murmur, rub or gallop   Abdomen:   Soft, non-tender, non-distended, positive bowel sounds  Extremities: No clubbing, cyanosis or edema, left arm in sling, clean dressing present over surgical site   Skin: No new rashes or lesions  Labs:    All pertinent labs and imaging studies were personally reviewed  Results from last 7 days   Lab Units 23  0458 23  0517 23  0553   WBC Thousand/uL 3 30* 2 77* 3 43*   HEMOGLOBIN g/dL 9 6* 9 3* 9 8*   PLATELETS Thousands/uL 91* 82* 96*     Results from last 7 days   Lab Units 23  0458 23  0517 23  0553   SODIUM mmol/L 135 135 133*   POTASSIUM mmol/L 4 4 4 2 4 3   CHLORIDE mmol/L 107 107 105   CO2 mmol/L 24 23 25   BUN mg/dL 16 20 25   CREATININE mg/dL 0 94 0 96 1 14   EGFR ml/min/1 73sq m 82 80 65   CALCIUM mg/dL 8 9 8 7 8 9                       Micro:  Results from last 7 days   Lab Units 23  1712 23  1708 23  1707 23  1651 23  1650 23  1649   GRAM STAIN RESULT  2+ Polys  No Polys or Bacteria seen No Polys or Bacteria seen Rare Disintegrating polys  No bacteria seen 2+ Polys 1+ Polys  No bacteria seen  1+ Mononuclear Cells  No bacteria seen No Polys or Bacteria seen       Imaging:

## 2023-06-19 NOTE — PLAN OF CARE
Problem: PAIN - ADULT  Goal: Verbalizes/displays adequate comfort level or baseline comfort level  Description: Interventions:  - Encourage patient to monitor pain and request assistance  - Assess pain using appropriate pain scale  - Administer analgesics based on type and severity of pain and evaluate response  - Implement non-pharmacological measures as appropriate and evaluate response  - Consider cultural and social influences on pain and pain management  - Notify physician/advanced practitioner if interventions unsuccessful or patient reports new pain  Outcome: Progressing     Problem: INFECTION - ADULT  Goal: Absence or prevention of progression during hospitalization  Description: INTERVENTIONS:  - Assess and monitor for signs and symptoms of infection  - Monitor lab/diagnostic results  - Monitor all insertion sites, i e  indwelling lines, tubes, and drains  - Monitor endotracheal if appropriate and nasal secretions for changes in amount and color  - [place] appropriate cooling/warming therapies per order  - Administer medications as ordered  - Instruct and encourage patient and family to use good hand hygiene technique  - Identify and instruct in appropriate isolation precautions for identified infection/condition  Outcome: Progressing  Goal: Absence of fever/infection during neutropenic period  Description: INTERVENTIONS:  - Monitor WBC    Outcome: Progressing     Problem: SAFETY ADULT  Goal: Patient will remain free of falls  Description: INTERVENTIONS:  - Educate patient/family on patient safety including physical limitations  - Instruct patient to call for assistance with activity   - Consult OT/PT to assist with strengthening/mobility   - Keep Call bell within reach  - Keep bed low and locked with side rails adjusted as appropriate  - Keep care items and personal belongings within reach  - Initiate and maintain comfort rounds  - Make Fall Risk Sign visible to staff  - Offer Toileting every 2 Hours, in advance of need  - Initiate/Maintain alarm  - Obtain necessary fall risk management equipment:   - Apply yellow socks and bracelet for high fall risk patients  - Consider moving patient to room near nurses station  Outcome: Progressing  Goal: Maintain or return to baseline ADL function  Description: INTERVENTIONS:  -  Assess patient's ability to carry out ADLs; assess patient's baseline for ADL function and identify physical deficits which impact ability to perform ADLs (bathing, care of mouth/teeth, toileting, grooming, dressing, etc )  - Assess/evaluate cause of self-care deficits   - Assess range of motion  - Assess patient's mobility; develop plan if impaired  - Assess patient's need for assistive devices and provide as appropriate  - Encourage maximum independence but intervene and supervise when necessary  - Involve family in performance of ADLs  - Assess for home care needs following discharge   - Consider OT consult to assist with ADL evaluation and planning for discharge  - Provide patient education as appropriate  Outcome: Progressing  Goal: Maintains/Returns to pre admission functional level  Description: INTERVENTIONS:  - Perform BMAT or MOVE assessment daily    - Set and communicate daily mobility goal to care team and patient/family/caregiver  - Collaborate with rehabilitation services on mobility goals if consulted  - Perform Range of Motion 3 times a day  - Reposition patient every 2 hours    - Dangle patient 3 times a day  - Stand patient 3 times a day  - Ambulate patient 3 times a day  - Out of bed to chair 3 times a day   - Out of bed for meals 3 times a day  - Out of bed for toileting  - Record patient progress and toleration of activity level   Outcome: Progressing     Problem: DISCHARGE PLANNING  Goal: Discharge to home or other facility with appropriate resources  Description: INTERVENTIONS:  - Identify barriers to discharge w/patient and caregiver  - Arrange for needed discharge resources and transportation as appropriate  - Identify discharge learning needs (meds, wound care, etc )  - Arrange for interpretive services to assist at discharge as needed  - Refer to Case Management Department for coordinating discharge planning if the patient needs post-hospital services based on physician/advanced practitioner order or complex needs related to functional status, cognitive ability, or social support system  Outcome: Progressing     Problem: Knowledge Deficit  Goal: Patient/family/caregiver demonstrates understanding of disease process, treatment plan, medications, and discharge instructions  Description: Complete learning assessment and assess knowledge base    Interventions:  - Provide teaching at level of understanding  - Provide teaching via preferred learning methods  Outcome: Progressing

## 2023-06-20 ENCOUNTER — APPOINTMENT (EMERGENCY)
Dept: CT IMAGING | Facility: HOSPITAL | Age: 68
DRG: 862 | End: 2023-06-20
Payer: MEDICARE

## 2023-06-20 ENCOUNTER — APPOINTMENT (EMERGENCY)
Dept: RADIOLOGY | Facility: HOSPITAL | Age: 68
DRG: 862 | End: 2023-06-20
Payer: MEDICARE

## 2023-06-20 ENCOUNTER — HOME CARE VISIT (OUTPATIENT)
Dept: HOME HEALTH SERVICES | Facility: HOME HEALTHCARE | Age: 68
End: 2023-06-20

## 2023-06-20 ENCOUNTER — HOSPITAL ENCOUNTER (INPATIENT)
Facility: HOSPITAL | Age: 68
LOS: 8 days | Discharge: HOME HEALTH | DRG: 862 | End: 2023-06-28
Attending: EMERGENCY MEDICINE | Admitting: INTERNAL MEDICINE
Payer: MEDICARE

## 2023-06-20 ENCOUNTER — APPOINTMENT (INPATIENT)
Dept: VASCULAR ULTRASOUND | Facility: HOSPITAL | Age: 68
DRG: 862 | End: 2023-06-20
Payer: MEDICARE

## 2023-06-20 DIAGNOSIS — K80.20 CHOLELITHIASIS: ICD-10-CM

## 2023-06-20 DIAGNOSIS — N18.9 ACUTE KIDNEY INJURY SUPERIMPOSED ON CHRONIC KIDNEY DISEASE (HCC): ICD-10-CM

## 2023-06-20 DIAGNOSIS — N17.9 ACUTE KIDNEY INJURY SUPERIMPOSED ON CHRONIC KIDNEY DISEASE (HCC): ICD-10-CM

## 2023-06-20 DIAGNOSIS — M19.112 POST-TRAUMATIC OSTEOARTHRITIS OF LEFT SHOULDER: ICD-10-CM

## 2023-06-20 DIAGNOSIS — E11.9 TYPE 2 DIABETES MELLITUS WITHOUT COMPLICATION, WITH LONG-TERM CURRENT USE OF INSULIN (HCC): ICD-10-CM

## 2023-06-20 DIAGNOSIS — T81.49XA POSTOPERATIVE CELLULITIS OF SURGICAL WOUND: ICD-10-CM

## 2023-06-20 DIAGNOSIS — Z79.4 TYPE 2 DIABETES MELLITUS WITHOUT COMPLICATION, WITH LONG-TERM CURRENT USE OF INSULIN (HCC): ICD-10-CM

## 2023-06-20 DIAGNOSIS — L97.521 ULCER OF LEFT FOOT, LIMITED TO BREAKDOWN OF SKIN (HCC): ICD-10-CM

## 2023-06-20 DIAGNOSIS — L03.114 CELLULITIS OF LEFT UPPER EXTREMITY: ICD-10-CM

## 2023-06-20 DIAGNOSIS — N17.9 ACUTE RENAL FAILURE, UNSPECIFIED ACUTE RENAL FAILURE TYPE (HCC): Primary | ICD-10-CM

## 2023-06-20 PROBLEM — R33.9 URINARY RETENTION: Status: ACTIVE | Noted: 2023-06-20

## 2023-06-20 PROBLEM — D61.818 PANCYTOPENIA (HCC): Status: ACTIVE | Noted: 2020-12-25

## 2023-06-20 PROBLEM — R60.0 BILATERAL LOWER EXTREMITY EDEMA: Status: ACTIVE | Noted: 2023-06-20

## 2023-06-20 LAB
ALBUMIN SERPL BCP-MCNC: 3.2 G/DL (ref 3.5–5)
ALP SERPL-CCNC: 161 U/L (ref 34–104)
ALT SERPL W P-5'-P-CCNC: 27 U/L (ref 7–52)
ANION GAP SERPL CALCULATED.3IONS-SCNC: 5 MMOL/L (ref 4–13)
ANION GAP SERPL CALCULATED.3IONS-SCNC: 6 MMOL/L
AST SERPL W P-5'-P-CCNC: 40 U/L (ref 13–39)
ATRIAL RATE: 79 BPM
BACTERIA UR QL AUTO: ABNORMAL /HPF
BASOPHILS # BLD AUTO: 0.03 THOUSANDS/ÂΜL (ref 0–0.1)
BASOPHILS NFR BLD AUTO: 1 % (ref 0–1)
BILIRUB DIRECT SERPL-MCNC: 0.33 MG/DL (ref 0–0.2)
BILIRUB SERPL-MCNC: 0.87 MG/DL (ref 0.2–1)
BILIRUB UR QL STRIP: NEGATIVE
BNP SERPL-MCNC: 259 PG/ML (ref 0–100)
BUN SERPL-MCNC: 28 MG/DL (ref 5–25)
BUN SERPL-MCNC: 35 MG/DL (ref 5–25)
CALCIUM SERPL-MCNC: 8.2 MG/DL (ref 8.4–10.2)
CALCIUM SERPL-MCNC: 8.7 MG/DL (ref 8.4–10.2)
CHLORIDE SERPL-SCNC: 103 MMOL/L (ref 96–108)
CHLORIDE SERPL-SCNC: 104 MMOL/L (ref 96–108)
CLARITY UR: CLEAR
CO2 SERPL-SCNC: 23 MMOL/L (ref 21–32)
CO2 SERPL-SCNC: 25 MMOL/L (ref 21–32)
COLOR UR: YELLOW
CREAT SERPL-MCNC: 2.29 MG/DL (ref 0.6–1.3)
CREAT SERPL-MCNC: 3.35 MG/DL (ref 0.6–1.3)
EOSINOPHIL # BLD AUTO: 0.27 THOUSAND/ÂΜL (ref 0–0.61)
EOSINOPHIL NFR BLD AUTO: 7 % (ref 0–6)
ERYTHROCYTE [DISTWIDTH] IN BLOOD BY AUTOMATED COUNT: 16 % (ref 11.6–15.1)
GFR SERPL CREATININE-BSD FRML MDRD: 17 ML/MIN/1.73SQ M
GFR SERPL CREATININE-BSD FRML MDRD: 28 ML/MIN/1.73SQ M
GLUCOSE SERPL-MCNC: 127 MG/DL (ref 65–140)
GLUCOSE SERPL-MCNC: 155 MG/DL (ref 65–140)
GLUCOSE SERPL-MCNC: 165 MG/DL (ref 65–140)
GLUCOSE SERPL-MCNC: 189 MG/DL (ref 65–140)
GLUCOSE SERPL-MCNC: 217 MG/DL (ref 65–140)
GLUCOSE SERPL-MCNC: 82 MG/DL (ref 65–140)
GLUCOSE UR STRIP-MCNC: NEGATIVE MG/DL
HCT VFR BLD AUTO: 28.9 % (ref 36.5–49.3)
HGB BLD-MCNC: 9 G/DL (ref 12–17)
HGB UR QL STRIP.AUTO: ABNORMAL
IMM GRANULOCYTES # BLD AUTO: 0.01 THOUSAND/UL (ref 0–0.2)
IMM GRANULOCYTES NFR BLD AUTO: 0 % (ref 0–2)
KETONES UR STRIP-MCNC: NEGATIVE MG/DL
LEUKOCYTE ESTERASE UR QL STRIP: NEGATIVE
LYMPHOCYTES # BLD AUTO: 0.62 THOUSANDS/ÂΜL (ref 0.6–4.47)
LYMPHOCYTES NFR BLD AUTO: 16 % (ref 14–44)
MCH RBC QN AUTO: 27.2 PG (ref 26.8–34.3)
MCHC RBC AUTO-ENTMCNC: 31.1 G/DL (ref 31.4–37.4)
MCV RBC AUTO: 87 FL (ref 82–98)
MONOCYTES # BLD AUTO: 0.47 THOUSAND/ÂΜL (ref 0.17–1.22)
MONOCYTES NFR BLD AUTO: 12 % (ref 4–12)
NEUTROPHILS # BLD AUTO: 2.51 THOUSANDS/ÂΜL (ref 1.85–7.62)
NEUTS SEG NFR BLD AUTO: 64 % (ref 43–75)
NITRITE UR QL STRIP: NEGATIVE
NON-SQ EPI CELLS URNS QL MICRO: ABNORMAL /HPF
NRBC BLD AUTO-RTO: 0 /100 WBCS
P AXIS: 53 DEGREES
PH UR STRIP.AUTO: 6 [PH]
PLATELET # BLD AUTO: 91 THOUSANDS/UL (ref 149–390)
PMV BLD AUTO: 11.3 FL (ref 8.9–12.7)
POTASSIUM SERPL-SCNC: 4.3 MMOL/L (ref 3.5–5.3)
POTASSIUM SERPL-SCNC: 4.8 MMOL/L (ref 3.5–5.3)
PR INTERVAL: 178 MS
PROT SERPL-MCNC: 5.9 G/DL (ref 6.4–8.4)
PROT UR STRIP-MCNC: ABNORMAL MG/DL
QRS AXIS: 3 DEGREES
QRSD INTERVAL: 88 MS
QT INTERVAL: 392 MS
QTC INTERVAL: 449 MS
RBC # BLD AUTO: 3.31 MILLION/UL (ref 3.88–5.62)
RBC #/AREA URNS AUTO: ABNORMAL /HPF
SODIUM SERPL-SCNC: 132 MMOL/L (ref 135–147)
SODIUM SERPL-SCNC: 134 MMOL/L (ref 135–147)
SP GR UR STRIP.AUTO: 1.01 (ref 1–1.03)
T WAVE AXIS: -10 DEGREES
UROBILINOGEN UR STRIP-ACNC: <2 MG/DL
VENTRICULAR RATE: 79 BPM
WBC # BLD AUTO: 3.91 THOUSAND/UL (ref 4.31–10.16)
WBC #/AREA URNS AUTO: ABNORMAL /HPF

## 2023-06-20 PROCEDURE — 81001 URINALYSIS AUTO W/SCOPE: CPT

## 2023-06-20 PROCEDURE — 93970 EXTREMITY STUDY: CPT | Performed by: SURGERY

## 2023-06-20 PROCEDURE — 80048 BASIC METABOLIC PNL TOTAL CA: CPT

## 2023-06-20 PROCEDURE — 93010 ELECTROCARDIOGRAM REPORT: CPT | Performed by: INTERNAL MEDICINE

## 2023-06-20 PROCEDURE — 96360 HYDRATION IV INFUSION INIT: CPT

## 2023-06-20 PROCEDURE — 85025 COMPLETE CBC W/AUTO DIFF WBC: CPT

## 2023-06-20 PROCEDURE — 80048 BASIC METABOLIC PNL TOTAL CA: CPT | Performed by: PHYSICIAN ASSISTANT

## 2023-06-20 PROCEDURE — 36415 COLL VENOUS BLD VENIPUNCTURE: CPT

## 2023-06-20 PROCEDURE — 82948 REAGENT STRIP/BLOOD GLUCOSE: CPT

## 2023-06-20 PROCEDURE — G1004 CDSM NDSC: HCPCS

## 2023-06-20 PROCEDURE — 99285 EMERGENCY DEPT VISIT HI MDM: CPT | Performed by: EMERGENCY MEDICINE

## 2023-06-20 PROCEDURE — 74176 CT ABD & PELVIS W/O CONTRAST: CPT

## 2023-06-20 PROCEDURE — 71045 X-RAY EXAM CHEST 1 VIEW: CPT

## 2023-06-20 PROCEDURE — 93970 EXTREMITY STUDY: CPT

## 2023-06-20 PROCEDURE — 99284 EMERGENCY DEPT VISIT MOD MDM: CPT

## 2023-06-20 PROCEDURE — 93005 ELECTROCARDIOGRAM TRACING: CPT

## 2023-06-20 PROCEDURE — 99223 1ST HOSP IP/OBS HIGH 75: CPT | Performed by: INTERNAL MEDICINE

## 2023-06-20 PROCEDURE — 80076 HEPATIC FUNCTION PANEL: CPT

## 2023-06-20 PROCEDURE — 83880 ASSAY OF NATRIURETIC PEPTIDE: CPT

## 2023-06-20 PROCEDURE — 1123F ACP DISCUSS/DSCN MKR DOCD: CPT | Performed by: INTERNAL MEDICINE

## 2023-06-20 RX ORDER — TAMSULOSIN HYDROCHLORIDE 0.4 MG/1
0.4 CAPSULE ORAL
Status: DISCONTINUED | OUTPATIENT
Start: 2023-06-20 | End: 2023-06-28 | Stop reason: HOSPADM

## 2023-06-20 RX ORDER — INSULIN LISPRO 100 [IU]/ML
2-12 INJECTION, SOLUTION INTRAVENOUS; SUBCUTANEOUS
Status: DISCONTINUED | OUTPATIENT
Start: 2023-06-20 | End: 2023-06-28 | Stop reason: HOSPADM

## 2023-06-20 RX ORDER — INSULIN LISPRO 100 [IU]/ML
1-5 INJECTION, SOLUTION INTRAVENOUS; SUBCUTANEOUS
Status: DISCONTINUED | OUTPATIENT
Start: 2023-06-20 | End: 2023-06-28 | Stop reason: HOSPADM

## 2023-06-20 RX ORDER — LIDOCAINE HYDROCHLORIDE 20 MG/ML
1 JELLY TOPICAL ONCE
Status: COMPLETED | OUTPATIENT
Start: 2023-06-20 | End: 2023-06-20

## 2023-06-20 RX ORDER — OXYCODONE HYDROCHLORIDE 10 MG/1
10 TABLET ORAL EVERY 6 HOURS PRN
Status: DISCONTINUED | OUTPATIENT
Start: 2023-06-20 | End: 2023-06-28 | Stop reason: HOSPADM

## 2023-06-20 RX ORDER — INSULIN GLARGINE 100 [IU]/ML
30 INJECTION, SOLUTION SUBCUTANEOUS
Status: DISCONTINUED | OUTPATIENT
Start: 2023-06-20 | End: 2023-06-21

## 2023-06-20 RX ORDER — DILTIAZEM HYDROCHLORIDE 180 MG/1
180 CAPSULE, COATED, EXTENDED RELEASE ORAL DAILY
Status: DISCONTINUED | OUTPATIENT
Start: 2023-06-20 | End: 2023-06-20

## 2023-06-20 RX ORDER — TRAZODONE HYDROCHLORIDE 50 MG/1
50 TABLET ORAL
Status: DISCONTINUED | OUTPATIENT
Start: 2023-06-20 | End: 2023-06-28 | Stop reason: HOSPADM

## 2023-06-20 RX ORDER — HEPARIN SODIUM 5000 [USP'U]/ML
5000 INJECTION, SOLUTION INTRAVENOUS; SUBCUTANEOUS EVERY 8 HOURS SCHEDULED
Status: DISCONTINUED | OUTPATIENT
Start: 2023-06-20 | End: 2023-06-28 | Stop reason: HOSPADM

## 2023-06-20 RX ORDER — SODIUM CHLORIDE 9 MG/ML
75 INJECTION, SOLUTION INTRAVENOUS CONTINUOUS
Status: DISCONTINUED | OUTPATIENT
Start: 2023-06-20 | End: 2023-06-22

## 2023-06-20 RX ADMIN — INSULIN LISPRO 1 UNITS: 100 INJECTION, SOLUTION INTRAVENOUS; SUBCUTANEOUS at 22:38

## 2023-06-20 RX ADMIN — HEPARIN SODIUM 5000 UNITS: 5000 INJECTION INTRAVENOUS; SUBCUTANEOUS at 08:15

## 2023-06-20 RX ADMIN — LIDOCAINE HYDROCHLORIDE 1 APPLICATION: 20 JELLY TOPICAL at 04:10

## 2023-06-20 RX ADMIN — INSULIN GLARGINE 30 UNITS: 100 INJECTION, SOLUTION SUBCUTANEOUS at 22:38

## 2023-06-20 RX ADMIN — INSULIN LISPRO 2 UNITS: 100 INJECTION, SOLUTION INTRAVENOUS; SUBCUTANEOUS at 16:39

## 2023-06-20 RX ADMIN — SODIUM CHLORIDE 75 ML/HR: 0.9 INJECTION, SOLUTION INTRAVENOUS at 22:38

## 2023-06-20 RX ADMIN — OXYCODONE HYDROCHLORIDE 10 MG: 10 TABLET ORAL at 22:37

## 2023-06-20 RX ADMIN — TRAZODONE HYDROCHLORIDE 50 MG: 50 TABLET ORAL at 22:38

## 2023-06-20 RX ADMIN — SODIUM CHLORIDE 500 ML: 0.9 INJECTION, SOLUTION INTRAVENOUS at 04:29

## 2023-06-20 RX ADMIN — INSULIN LISPRO 2 UNITS: 100 INJECTION, SOLUTION INTRAVENOUS; SUBCUTANEOUS at 12:28

## 2023-06-20 RX ADMIN — HEPARIN SODIUM 5000 UNITS: 5000 INJECTION INTRAVENOUS; SUBCUTANEOUS at 13:30

## 2023-06-20 RX ADMIN — HEPARIN SODIUM 5000 UNITS: 5000 INJECTION INTRAVENOUS; SUBCUTANEOUS at 22:37

## 2023-06-20 RX ADMIN — TAMSULOSIN HYDROCHLORIDE 0.4 MG: 0.4 CAPSULE ORAL at 16:39

## 2023-06-20 RX ADMIN — DAPTOMYCIN 700 MG: 500 INJECTION, POWDER, LYOPHILIZED, FOR SOLUTION INTRAVENOUS at 10:28

## 2023-06-20 NOTE — ASSESSMENT & PLAN NOTE
· BP acceptable. · Hold losartan. Continue Cardizem with hold parameters for SBP < 130 to avoid hypotension.

## 2023-06-20 NOTE — ASSESSMENT & PLAN NOTE
· Patient reports worsening b/l lower extremity edema since his recent hospitalization. · RLE edema > LLE on exam which patient reports is typical for his edema. · BNP mildly elevated at 259. · Recent echo on 6/16/23 with EF 60%, G1DD, mild AS. · Not on Lasix as an outpatient; prescribed Dyazide which will be held in the setting of ABILIO. · Obtain venous doppler to r/o DVT in the setting of recent hospitalization. · Elevate extremities.

## 2023-06-20 NOTE — ED ATTENDING ATTESTATION
6/20/2023  IEugenio MD, saw and evaluated the patient. I have discussed the patient with the resident/non-physician practitioner and agree with the resident's/non-physician practitioner's findings, Plan of Care, and MDM as documented in the resident's/non-physician practitioner's note, except where noted. All available labs and Radiology studies were reviewed. I was present for key portions of any procedure(s) performed by the resident/non-physician practitioner and I was immediately available to provide assistance. At this point I agree with the current assessment done in the Emergency Department. I have conducted an independent evaluation of this patient a history and physical is as follows: Patient is a 69year old male with no urination for past 12 hours. States he has been drinking and eating normally. No fever. No abdominal pain. No N/V/D. (+) increased abdominal swelling and leg swelling. Was last seen at [address] [place] [address] on 6/13/23 for left shoulder arthroplasty. [facility] website checked on this patient and last Rx filled was on 5/2/23 for oxycodone for 3 day supply. NCAT. Mucous membranes somewhat moist. Lungs clear. Heart regular with murmur. Abdomen somewhat distended and nontender. Good bowel sounds. (+) LE edema. No jaundice or rash noted. DDX including but not limited to: urinary retention, reaction to anesthesia, BPH, hematuria, UTI, tumor, neurologic etiology, acute renal failure, dehydration, metabolic abnormality; doubt CHF or cardiac etiology. Will check labs, EKG and CXR and order cadena.      ED Course         Critical Care Time  Procedures

## 2023-06-20 NOTE — ASSESSMENT & PLAN NOTE
· In the setting of cirrhosis  · Platelet count stable at 91. · Hgb 9.0 from 9.6 yesterday. · Leukopenia stable. · Continue to monitor CBC.

## 2023-06-20 NOTE — ED PROVIDER NOTES
History  Chief Complaint   Patient presents with   • Urinary Retention     Pt reports he was d/c from hospital yesterday and has not voided since noon yesterday. Pt denies abd pressure, and reports normal intake of fluids     73-year-old male with history of diabetes mellitus, HTN, liver disease, arrhythmia presents with inability to urinate. Patient states that he was recently admitted for shoulder surgery with antibiotic spacer placement. On discharge today he went home drink plenty of fluids but was unable to urinate over the past 12 hours. Wife was concerned so brought him for evaluation. During hospital visit patient states that he was straight cath on 2 occasions but denies having Tejeda catheter. 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 Denies history of cardiac disease. Denies history of renal failure. Denies prostate issues or BPH. Associated decrease in urine, leg swelling. Denies fevers, chills, shortness of breath, palpitations, diaphoresis, orthopnea, exertional dyspnea, abdominal pain, back pain, nausea, vomiting, dysuria, frequency, urgency, constipation, diarrhea, leg pain, history of VTE, recent long travel, cough. Prior to Admission Medications   Prescriptions Last Dose Informant Patient Reported? Taking? DAPTOmycin (CUBICIN) 50 mL IVPB   No No   Sig: Do not start before June 20, 2023. Insulin Glargine Solostar (Lantus SoloStar) 100 UNIT/ML SOPN  Self No No   Sig: Inject 0.4 mL (40 Units total) under the skin daily at bedtime   Insulin Pen Needle (BD Pen Needle Nayla 2nd Gen) 32G X 4 MM MISC  Self No No   Sig: For use with insulin pen. Pharmacy may dispense brand covered by insurance.    Multiple Vitamin (multivitamin) capsule  Self No No   Sig: Take 1 capsule by mouth daily   diltiazem (CARDIZEM CD) 180 mg 24 hr capsule  Self Yes No glimepiride (AMARYL) 4 mg tablet  Self Yes No   losartan (COZAAR) 100 MG tablet  Self Yes No   oxyCODONE (ROXICODONE) 10 MG TABS   No No   Sig: Take 1 tablet (10 mg total) by mouth every 6 (six) hours as needed for severe pain for up to 10 days Max Daily Amount: 40 mg   traZODone (DESYREL) 50 mg tablet  Self Yes No   Sig: bedtime   triamterene-hydrochlorothiazide (DYAZIDE) 37.5-25 mg per capsule  Self Yes No   Sig: Take 1 capsule by mouth every morning      Facility-Administered Medications: None       Past Medical History:   Diagnosis Date   • Arrhythmia    • CPAP (continuous positive airway pressure) dependence    • Diabetes mellitus ([address])    • History of echocardiogram 11/14/2017    showed EF of 50-55 percentWith moderate LVH and left ventricle diastolic dysfunction. Left atrium was moderately enlarged. Trace MR noted. • History of Holter monitoring 11/21/2017    showed baseline rhythm of sinus origin with an average heart it of 61 bpm. The lowest heart rate was 49 and the highest heart rate was 10 8 bpm. There were rare single VPCs, and frequent PACs representing 3.2% of total beats. There were several episodes of sinus arrhythmias with sinus bradycardia and heart rate ranging from 40-90 bpm. No sustained dysrhythmias, or pauses noted.  The patient did not   • Hypertension    • Liver disease    • Obese    • Sleep apnea        Past Surgical History:   Procedure Laterality Date   • CATARACT EXTRACTION     • EYE SURGERY Left 1997   • HERNIA REPAIR  [id number]   • KNEE ARTHROPLASTY Right 2008   • TN ARTHROPLASTY GLENOHUMERAL JOINT TOTAL SHOULDER Left 4/4/2023    Procedure: ARTHROPLASTY SHOULDER REVERSE;  Surgeon: Jeremy Negron MD;  Location: [facility];  Service: Orthopedics   • SHOULDER SURGERY Right 2002   • WOUND DEBRIDEMENT Left 5/2/2023    Procedure: INCISION AND DRAINAGE (I&D) EXTREMITY, vac placement;  Surgeon: Jeremy Negron MD;  Location: [facility];  Service: Orthopedics Family History   Problem Relation Age of Onset   • Diabetes Mother    • Supraventricular tachycardia Mother    • COPD Father    • Heart disease Father    • Other Father         Sepsis; related to UTI with complicating cardiac problems   • Heart disease Paternal Grandmother      I have reviewed and agree with the history as documented. E-Cigarette/Vaping   • E-Cigarette Use Never User      E-Cigarette/Vaping Substances   • Nicotine No    • THC No    • CBD No    • Flavoring No    • Other No    • Unknown No      Social History     Tobacco Use   • Smoking status: Former     Packs/day: 0.50     Years: 20.00     Total pack years: 10.00     Types: Cigarettes     Quit date: 44     Years since quittin.4   • Smokeless tobacco: Never   Vaping Use   • Vaping Use: Never used   Substance Use Topics   • Alcohol use: Not Currently     Comment: quit    • Drug use: Never        Review of Systems   Constitutional: Negative for activity change, appetite change, chills, diaphoresis, fatigue and fever. Respiratory: Negative for chest tightness and shortness of breath. Cardiovascular: Positive for leg swelling. Negative for chest pain and palpitations. Gastrointestinal: Negative for abdominal pain. Endocrine: Negative for polyuria. Genitourinary: Positive for decreased urine volume. Negative for difficulty urinating, dysuria, frequency, hematuria and urgency. Musculoskeletal: Negative for back pain. All other systems reviewed and are negative.       Physical Exam  ED Triage Vitals   Temperature Pulse Respirations Blood Pressure SpO2   23 0206 23 0205 23 0205 23 0206 23 0205   98.5 °F (36.9 °C) 85 18 128/58 98 %      Temp Source Heart Rate Source Patient Position - Orthostatic VS BP Location FiO2 (%)   23 0206 23 0205 23 0205 23 0205 --   Oral Monitor Sitting Right arm       Pain Score       --                    Orthostatic Vital Signs  Vitals: 06/20/23 0205 06/20/23 0206 06/20/23 0705 06/20/23 0800   BP:  128/58 135/64 122/56   Pulse: 85  78 73   Patient Position - Orthostatic VS: Sitting  Sitting Lying       Physical Exam  Vitals and nursing note reviewed. Constitutional:       General: He is not in acute distress. Appearance: He is obese. He is ill-appearing. He is not toxic-appearing or diaphoretic. Comments: Anasarca, left arm sling. HENT:      Head: Normocephalic and atraumatic. Right Ear: External ear normal.      Left Ear: External ear normal.      Nose: Nose normal.      Mouth/Throat:      Mouth: Mucous membranes are moist.   Eyes:      General: No scleral icterus. Right eye: No discharge. Left eye: No discharge. Extraocular Movements: Extraocular movements intact. Conjunctiva/sclera: Conjunctivae normal.   Cardiovascular:      Rate and Rhythm: Normal rate and regular rhythm. Pulses: Normal pulses. Heart sounds: Murmur (Systolic murmur.) heard. Pulmonary:      Effort: Pulmonary effort is normal. No respiratory distress. Breath sounds: Normal breath sounds. No stridor. No wheezing, rhonchi or rales. Chest:      Chest wall: No tenderness. Abdominal:      General: There is distension. Palpations: Abdomen is soft. There is no mass. Tenderness: There is no abdominal tenderness. There is no right CVA tenderness, left CVA tenderness, guarding or rebound. Hernia: No hernia is present. Musculoskeletal:         General: No swelling, tenderness, deformity or signs of injury. Normal range of motion. Cervical back: Neck supple. Right lower leg: Edema present. Left lower leg: Edema present. Skin:     General: Skin is warm. Capillary Refill: Capillary refill takes less than 2 seconds. Coloration: Skin is not jaundiced or pale. Findings: Bruising present. No erythema, lesion or rash.    Neurological:      Mental Status: He is alert and oriented to person, place, and time. Mental status is at baseline.    Psychiatric:         Mood and Affect: Mood normal.         Behavior: Behavior normal.         ED Medications  Medications   DAPTOmycin (CUBICIN) 700 mg in sodium chloride 0.9 % 50 mL IVPB (has no administration in time range)   diltiazem (CARDIZEM CD) 24 hr capsule 180 mg (0 mg Oral Hold 6/20/23 0828)   insulin glargine (LANTUS) subcutaneous injection 30 Units 0.3 mL (has no administration in time range)   oxyCODONE (ROXICODONE) immediate release tablet 10 mg (has no administration in time range)   traZODone (DESYREL) tablet 50 mg (has no administration in time range)   heparin (porcine) subcutaneous injection 5,000 Units (5,000 Units Subcutaneous Given 6/20/23 0815)   insulin lispro (HumaLOG) 100 units/mL subcutaneous injection 2-12 Units ( Subcutaneous Not Given 6/20/23 0815)   insulin lispro (HumaLOG) 100 units/mL subcutaneous injection 1-5 Units (has no administration in time range)   lidocaine (URO-JET) 2 % urethral/mucosal gel 1 Application (1 Application Urethral Given 6/20/23 0410)   sodium chloride 0.9 % bolus 500 mL (0 mL Intravenous Stopped 6/20/23 0529)       Diagnostic Studies  Results Reviewed     Procedure Component Value Units Date/Time    Fingerstick Glucose (POCT) [[id number]]  (Normal) Collected: 06/20/23 0815    Lab Status: Final result Updated: 06/20/23 0817     POC Glucose 82 mg/dl     Urinalysis with microscopic [[id number]]     Lab Status: No result Specimen: Urine, Clean Catch     Urine Microscopic [[id number]]  (Abnormal) Collected: 06/20/23 0420    Lab Status: Final result Specimen: Urine, Other Updated: 06/20/23 0432     RBC, UA 2-4 /hpf      WBC, UA 1-2 /hpf      Epithelial Cells Occasional /hpf      Bacteria, UA None Seen /hpf     UA w Reflex to Microscopic w Reflex to Culture [[id number]]  (Abnormal) Collected: 06/20/23 0420    Lab Status: Final result Specimen: Urine, Other Updated: 06/20/23 0431     Color, UA Yellow     Clarity, UA Clear     Specific Gravity, UA 1.008     pH, UA 6.0     Leukocytes, UA Negative     Nitrite, UA Negative     Protein, UA Trace mg/dl      Glucose, UA Negative mg/dl      Ketones, UA Negative mg/dl      Urobilinogen, UA <2.0 mg/dl      Bilirubin, UA Negative     Occult Blood, UA Small    B-Type Natriuretic Peptide(BNP) [[id number]]  (Abnormal) Collected: 06/20/23 0339    Lab Status: Final result Specimen: Blood from Arm, Right Updated: 06/20/23 0408      pg/mL     Basic metabolic panel [[id number]]  (Abnormal) Collected: 06/20/23 0339    Lab Status: Final result Specimen: Blood from Arm, Right Updated: 06/20/23 0402     Sodium 134 mmol/L      Potassium 4.3 mmol/L      Chloride 104 mmol/L      CO2 25 mmol/L      ANION GAP 5 mmol/L      BUN 28 mg/dL      Creatinine 2.29 mg/dL      Glucose 127 mg/dL      Calcium 8.7 mg/dL      eGFR 28 ml/min/1.73sq m     Narrative:      [place] guidelines for Chronic Kidney Disease (CKD):   •  Stage 1 with normal or high GFR (GFR > 90 mL/min/1.73 square meters)  •  Stage 2 Mild CKD (GFR = 60-89 mL/min/1.73 square meters)  •  Stage 3A Moderate CKD (GFR = 45-59 mL/min/1.73 square meters)  •  Stage 3B Moderate CKD (GFR = 30-44 mL/min/1.73 square meters)  •  Stage 4 Severe CKD (GFR = 15-29 mL/min/1.73 square meters)  •  Stage 5 End Stage CKD (GFR <15 mL/min/1.73 square meters)  Note: GFR calculation is accurate only with a steady state creatinine    Hepatic function panel [[id number]]  (Abnormal) Collected: 06/20/23 0339    Lab Status: Final result Specimen: Blood from Arm, Right Updated: 06/20/23 0402     Total Bilirubin 0.87 mg/dL      Bilirubin, Direct 0.33 mg/dL      Alkaline Phosphatase 161 U/L      AST 40 U/L      ALT 27 U/L      Total Protein 5.9 g/dL      Albumin 3.2 g/dL     CBC and differential [[id number]]  (Abnormal) Collected: 06/20/23 0339    Lab Status: Final result Specimen: Blood from Arm, Right Updated: 06/20/23 0357     WBC 3.91 Thousand/uL RBC 3.31 Million/uL      Hemoglobin 9.0 g/dL      Hematocrit 28.9 %      MCV 87 fL      MCH 27.2 pg      MCHC 31.1 g/dL      RDW 16.0 %      MPV 11.3 fL      Platelets 91 Thousands/uL      nRBC 0 /100 WBCs      Neutrophils Relative 64 %      Immat GRANS % 0 %      Lymphocytes Relative 16 %      Monocytes Relative 12 %      Eosinophils Relative 7 %      Basophils Relative 1 %      Neutrophils Absolute 2.51 Thousands/µL      Immature Grans Absolute 0.01 Thousand/uL      Lymphocytes Absolute 0.62 Thousands/µL      Monocytes Absolute 0.47 Thousand/µL      Eosinophils Absolute 0.27 Thousand/µL      Basophils Absolute 0.03 Thousands/µL                  CT abdomen pelvis wo contrast   Final Result by Althea Rouse MD (06/20 9311)      No hydronephrosis. Cirrhotic liver with portal hypertension. Splenomegaly. Trace ascites. Cholelithiasis. Workstation performed: [id number]         XR chest 1 view portable   ED Interpretation by Cari Jaffe MD (06/20 0407)   No acute cardiopulmonary process noted. VAS lower limb venous duplex study, complete bilateral    (Results Pending)         Procedures  ECG 12 Lead Documentation Only    Date/Time: 6/20/2023 4:52 AM    Performed by: Cari Jaffe MD  Authorized by: Cari Jaffe MD    Interpretation:     Interpretation comment:  As in ED course. ED Course  ED Course as of 06/20/23 0836   Tue Jun 20, 5910   6611 Systolic murmur. 0405 EKG normal sinus rhythm rate of 79, normal NV interval, normal QRS interval, QTc 449, normal axis, no ST elevations, no ST depressions, T wave flattening in lead II, V3, V4, V5, V6 T wave inversion in lead III. When compared to previous EKG on May 1, 2023 new T wave inversion in lead III and new T wave flattening and anterolateral leads. 0408 Hemoglobin(!): 9.0  Baseline   0408 WBC(!): 3.91  Normal ANC   0607 IMPRESSION:     No hydronephrosis.     Cirrhotic liver with portal hypertension. Splenomegaly. Trace ascites.     Cholelithiasis.          Workstation performed: [id number]                                       Medical Decision Making  41-year-old male with history of diabetes mellitus, HTN, liver disease, arrhythmia presents with inability to urinate. Was recently hospitalized and after discharge states he has been unable to urinate for over 12 hours now even despite drinking plenty of fluids. He states that his creatinine was normal on discharge at 0.9 but did have incidents where his creatinine was approximately 2.5 per the patient. States history of liver disease from alcohol use. Denies history of cardiac disease besides noted hypertension. Differential includes but not limited to heart failure, renal failure, liver failure  Ultrasound of the bladder showed only mild amount of urine with enlarged prostate. Imaging and labs as above with notable creatinine of 2.29 with elevated BNP  Discussed findings with patient advised need for further work-up of new acute kidney failure. Patient was understanding and agreement with plan and admitted to internal medicine service. Amount and/or Complexity of Data Reviewed  Labs: ordered. Decision-making details documented in ED Course. Radiology: ordered and independent interpretation performed. Risk  Prescription drug management. Decision regarding hospitalization.             Disposition  Final diagnoses:   Acute renal failure, unspecified acute renal failure type ([address])   Cholelithiasis     Time reflects when diagnosis was documented in both MDM as applicable and the Disposition within this note     Time User Action Codes Description Comment    6/20/2023  6:27 AM Zak Lilly Add [N17.9] Acute renal failure, unspecified acute renal failure type ([address])     6/20/2023  7:24 AM Dom MALONE Add [N17.9,  N18.9] Acute kidney injury superimposed on chronic kidney disease stage 3     6/20/2023  8:32 AM Weston Lilly Add [K80.20] Cholelithiasis       ED Disposition     ED Disposition   Admit    Condition   Stable    Date/Time   Tue Jun 20, 2023  6:26 AM    Comment   Case was discussed with Sergey Srinivasan and the patient's admission status was agreed to be Admission Status: inpatient status to the service of Dr. Ingrid Estrada . Follow-up Information    None         Patient's Medications   Discharge Prescriptions    No medications on file     No discharge procedures on file. PDMP Review       Value Time User    PDMP Reviewed  Yes 6/20/2023  2:51 AM Chelsi Boone MD           ED Provider  Attending physically available and evaluated Marquis Thomas. I managed the patient along with the ED Attending.     Electronically Signed by         Marietta Rausch MD  06/20/23 0469

## 2023-06-20 NOTE — ASSESSMENT & PLAN NOTE
· ABILIO, POA, with Cr 2.29 from 0.94 one day ago. · Presented with urinary retention for 12 hours prior to presentation. · Tejeda placed in the ED. Monitor urine output. · UA unremarkable for infection. · CT A/P: "No hydronephrosis."  · Received 500 mL bolus of NS in the ED. · Hold off on further IV fluids at this time given lower extremity edema. · Hold Dyazide and losartan. · Avoid nephrotoxins and hypotension. · Repeat BMP this evening and in AM.   · Nephrology consulted.

## 2023-06-20 NOTE — ASSESSMENT & PLAN NOTE
· CT shows "cirrhotic liver with portal hypertension. Splenomegaly. Trace ascites."  · Follows with GI as an outpatient.

## 2023-06-20 NOTE — PLAN OF CARE
Problem: PAIN - ADULT  Goal: Verbalizes/displays adequate comfort level or baseline comfort level  Description: Interventions:  - Encourage patient to monitor pain and request assistance  - Assess pain using appropriate pain scale  - Administer analgesics based on type and severity of pain and evaluate response  - Implement non-pharmacological measures as appropriate and evaluate response  - Consider cultural and social influences on pain and pain management  - Notify physician/advanced practitioner if interventions unsuccessful or patient reports new pain  6/20/2023 1210 by Dennis Hector RN  Outcome: Progressing  6/20/2023 1208 by Dennis Hector RN  Outcome: Progressing  6/20/2023 1208 by Dennis Hector RN  Outcome: Progressing     Problem: INFECTION - ADULT  Goal: Absence or prevention of progression during hospitalization  Description: INTERVENTIONS:  - Assess and monitor for signs and symptoms of infection  - Monitor lab/diagnostic results  - Monitor all insertion sites, i.e. indwelling lines, tubes, and drains  - Monitor endotracheal if appropriate and nasal secretions for changes in amount and color  - [place] appropriate cooling/warming therapies per order  - Administer medications as ordered  - Instruct and encourage patient and family to use good hand hygiene technique  - Identify and instruct in appropriate isolation precautions for identified infection/condition  6/20/2023 1210 by Dennis Hector RN  Outcome: Progressing  6/20/2023 1208 by Dennis Hector RN  Outcome: Progressing  6/20/2023 1208 by Dennis Hector RN  Outcome: Progressing     Problem: SAFETY ADULT  Goal: Patient will remain free of falls  Description: INTERVENTIONS:  - Educate patient/family on patient safety including physical limitations  - Instruct patient to call for assistance with activity   - Consult OT/PT to assist with strengthening/mobility   - Keep Call bell within reach  - Keep bed low and locked with side rails adjusted as appropriate  - Keep care items and personal belongings within reach  - Initiate and maintain comfort rounds  - Make Fall Risk Sign visible to staff  - Offer Toileting every 2 Hours, in advance of need  - Initiate/Maintain alarm  - Obtain necessary fall risk management equipment:   - Apply yellow socks and bracelet for high fall risk patients  - Consider moving patient to room near nurses station  6/20/2023 1210 by Lilia Jimenez RN  Outcome: Progressing  6/20/2023 1208 by Lilia Jimenez RN  Outcome: Progressing  6/20/2023 1208 by Lilia Jimenez RN  Outcome: Progressing  Goal: Maintain or return to baseline ADL function  Description: INTERVENTIONS:  -  Assess patient's ability to carry out ADLs; assess patient's baseline for ADL function and identify physical deficits which impact ability to perform ADLs (bathing, care of mouth/teeth, toileting, grooming, dressing, etc.)  - Assess/evaluate cause of self-care deficits   - Assess range of motion  - Assess patient's mobility; develop plan if impaired  - Assess patient's need for assistive devices and provide as appropriate  - Encourage maximum independence but intervene and supervise when necessary  - Involve family in performance of ADLs  - Assess for home care needs following discharge   - Consider OT consult to assist with ADL evaluation and planning for discharge  - Provide patient education as appropriate  6/20/2023 1210 by Lilia Jimenez RN  Outcome: Progressing  6/20/2023 1208 by Lilia Jimenez RN  Outcome: Progressing  6/20/2023 1208 by Lilia Jimenez RN  Outcome: Progressing  Goal: Maintains/Returns to pre admission functional level  Description: INTERVENTIONS:  - Perform BMAT or MOVE assessment daily.   - Set and communicate daily mobility goal to care team and patient/family/caregiver. - Collaborate with rehabilitation services on mobility goals if consulted  - Perform Range of Motion 2 times a day. - Reposition patient every 2 hours.   - Dangle patient 2 times a day  - Stand patient 2 times a day  - Ambulate patient 2 times a day  - Out of bed to chair 2 times a day   - Out of bed for meals 2 times a day  - Out of bed for toileting  - Record patient progress and toleration of activity level   6/20/2023 1210 by Edie Fitzgerald RN  Outcome: Progressing  6/20/2023 1208 by Edie Fitzgerald RN  Outcome: Progressing  6/20/2023 1208 by Edie Fitzgerald RN  Outcome: Progressing     Problem: DISCHARGE PLANNING  Goal: Discharge to home or other facility with appropriate resources  Description: INTERVENTIONS:  - Identify barriers to discharge w/patient and caregiver  - Arrange for needed discharge resources and transportation as appropriate  - Identify discharge learning needs (meds, wound care, etc.)  - Arrange for interpretive services to assist at discharge as needed  - Refer to Case Management Department for coordinating discharge planning if the patient needs post-hospital services based on physician/advanced practitioner order or complex needs related to functional status, cognitive ability, or social support system  6/20/2023 1210 by Edie Fitzgerald RN  Outcome: Progressing  6/20/2023 1208 by Edie Fitzgerald RN  Outcome: Progressing  6/20/2023 1208 by Edie Fitzgerald RN  Outcome: Progressing     Problem: Knowledge Deficit  Goal: Patient/family/caregiver demonstrates understanding of disease process, treatment plan, medications, and discharge instructions  Description: Complete learning assessment and assess knowledge base.   Interventions:  - Provide teaching at level of understanding  - Provide teaching via preferred learning methods  6/20/2023 1210 by Edie Fitzgerald RN  Outcome: Progressing  6/20/2023 1208 by Edie Fitzgerald RN  Outcome: Progressing  6/20/2023 1208 by Edie Fitzgerald RN  Outcome: Progressing

## 2023-06-20 NOTE — H&P
[address] [facility]  H&P  Name: Nandini Parsons 69 y.o. male I MRN: [id number]  Unit/Bed#: ED-22 I Date of Admission: 6/20/2023   Date of Service: 6/20/2023 I Hospital Day: 0      Assessment/Plan   * ABILIO (acute kidney injury) ([address])  Assessment & Plan  · ABILIO, POA, with Cr 2.29 from 0.94 one day ago. · Presented with urinary retention for 12 hours prior to presentation. · Tejeda placed in the ED. Monitor urine output. · UA unremarkable for infection. · CT A/P: "No hydronephrosis."  · Received 500 mL bolus of NS in the ED. · Hold off on further IV fluids at this time given lower extremity edema. · Hold Dyazide and losartan. · Avoid nephrotoxins and hypotension. · Repeat BMP this evening and in AM.   · Nephrology consulted. Bilateral lower extremity edema  Assessment & Plan  · Patient reports worsening b/l lower extremity edema since his recent hospitalization. · RLE edema > LLE on exam which patient reports is typical for his edema. · BNP mildly elevated at 259. · Recent echo on 6/16/23 with EF 60%, G1DD, mild AS. · Not on Lasix as an outpatient; prescribed Dyazide which will be held in the setting of ABILIO. · Obtain venous doppler to r/o DVT in the setting of recent hospitalization. · Elevate extremities. Postoperative infection of surgical wound  Assessment & Plan  · Patient is s/p reverse total shoulder arthoplasty on 4/423 and s/p I&D of left shoulder on 5/2/23 with recent admission at [facility] for left shoulder surgical site infection with abscess and possible prosthetic infection and is now s/p explantation and spacer placement on 6/13/23 with cultures growing cutibacterium avidum and staphylococcus epidermidis. · Discharged from [facility] on 6/19/23 on IV daptomycin 700 mg q24h for 6 weeks (until 7/28/23). Continue IV daptomycin. Morbid obesity ([address])  Assessment & Plan  · BMI 36.50. · Recommend diet, lifestyle modifications.      Pancytopenia ([address])  Assessment & Plan  · In the setting of cirrhosis  · Platelet count stable at 91. · Hgb 9.0 from 9.6 yesterday. · Leukopenia stable. · Continue to monitor CBC. MEG (obstructive sleep apnea)  Assessment & Plan  · Continue CPAP HS. Alcoholic cirrhosis ([address])  Assessment & Plan  · CT shows "cirrhotic liver with portal hypertension. Splenomegaly. Trace ascites."  · Follows with GI as an outpatient. Essential hypertension  Assessment & Plan  · BP acceptable. · Hold losartan. Continue Cardizem with hold parameters for SBP < 130 to avoid hypotension. Insulin-dependent diabetes mellitus  Assessment & Plan  Lab Results   Component Value Date    HGBA1C 8.4 (H) 03/20/2023       Recent Labs     06/18/23  1617 06/18/23  2110 06/19/23  0716 06/19/23  1105   POCGLU 264* 217* 198* 259*       Blood Sugar Average: Last 72 hrs:     • Hold Amaryl while inpatient. • Monitor accu-checks AC and HS. • Continue home insulin regimen with Lantus HS  • SSI coverage. • Hypoglycemia protocol. VTE Pharmacologic Prophylaxis: VTE Score: 3 Moderate Risk (Score 3-4) - Pharmacological DVT Prophylaxis Ordered: heparin. Code Status: level 1- full code  Discussion with family: Updated  (wife) at bedside. Anticipated Length of Stay: Patient will be admitted on an inpatient basis with an anticipated length of stay of greater than 2 midnights secondary to ABILIO, need for nephrology eval and close monitoring of renal function. .    Total Time Spent on Date of Encounter in care of patient: 75 minutes This time was spent on one or more of the following: performing physical exam; counseling and coordination of care; obtaining or reviewing history; documenting in the medical record; reviewing/ordering tests, medications or procedures; communicating with other healthcare professionals and discussing with patient's family/caregivers.     Chief Complaint: Urinary retention    History of Present Illness:  Michelle Stewart is a 69 y.o. male with a PMH of HTN, liver cirrhosis, type 2 DM, MEG on CPAP, obesity who presents with urinary retention. Patient was discharged from [address] [place] [address] yesterday after being admitted on 6/11/23 for left shoulder infection and underwent left shoulder resection revision/ arthroplasty and insertion of antibiotic spacer on 6/13. He was discharged on IV daptomycin to complete a 6 week course of antibiotics. He notes that after being discharged yesterday he was unable to urinate for 12 hours prior to presentation despite having adequate fluid intake. He states that during his recent admission, he required straight cath on 2 occasions post-op but did not require cadena catheter. He reports that when he returned home yesterday he resumed taking all of his mediations including his Dyazide. He denies recent chest pain, SOB, abdominal pain, n/v/d, lightheadedness or dizziness. He notes that during his recent hospitalization his bilateral lower extremity edema was worsening. He reports being told in the past that he has an enlarged prostate but has never required medication for this. Review of Systems:  Review of Systems   Constitutional: Negative for appetite change, chills, fatigue and fever. Respiratory: Negative for shortness of breath. Cardiovascular: Positive for leg swelling. Negative for chest pain. Gastrointestinal: Negative for abdominal pain, diarrhea, nausea and vomiting. Genitourinary: Positive for decreased urine volume and difficulty urinating. Negative for hematuria. Neurological: Negative for dizziness. All other systems reviewed and are negative. Past Medical and Surgical History:   Past Medical History:   Diagnosis Date   • Arrhythmia    • CPAP (continuous positive airway pressure) dependence    • Diabetes mellitus ([address])    • History of echocardiogram 11/14/2017    showed EF of 50-55 percentWith moderate LVH and left ventricle diastolic dysfunction.  Left atrium was moderately enlarged. Trace MR noted. • History of Holter monitoring 11/21/2017    showed baseline rhythm of sinus origin with an average heart it of 61 bpm. The lowest heart rate was 49 and the highest heart rate was 10 8 bpm. There were rare single VPCs, and frequent PACs representing 3.2% of total beats. There were several episodes of sinus arrhythmias with sinus bradycardia and heart rate ranging from 40-90 bpm. No sustained dysrhythmias, or pauses noted. The patient did not   • Hypertension    • Liver disease    • Obese    • Sleep apnea        Past Surgical History:   Procedure Laterality Date   • CATARACT EXTRACTION     • EYE SURGERY Left 1997   • HERNIA REPAIR  [id number]   • KNEE ARTHROPLASTY Right 2008   • ND ARTHROPLASTY GLENOHUMERAL JOINT TOTAL SHOULDER Left 4/4/2023    Procedure: ARTHROPLASTY SHOULDER REVERSE;  Surgeon: Ondina Wisdom MD;  Location: [facility];  Service: Orthopedics   • SHOULDER SURGERY Right 2002   • WOUND DEBRIDEMENT Left 5/2/2023    Procedure: INCISION AND DRAINAGE (I&D) EXTREMITY, vac placement;  Surgeon: Ondina Wisdom MD;  Location: [facility];  Service: Orthopedics       Meds/Allergies:  Prior to Admission medications    Medication Sig Start Date End Date Taking? Authorizing Provider   DAPTOmycin (CUBICIN) 50 mL IVPB Do not start before June 20, 2023. 6/20/23 7/29/23  Alysia Poon PA-C   diltiazem (CARDIZEM CD) 180 mg 24 hr capsule  3/22/23   Historical Provider, MD   glimepiride (AMARYL) 4 mg tablet  3/22/23   Historical Provider, MD   Insulin Glargine Solostar (Lantus SoloStar) 100 UNIT/ML SOPN Inject 0.4 mL (40 Units total) under the skin daily at bedtime 9/16/22   Orion Parmar DO   Insulin Pen Needle (BD Pen Needle Nayla 2nd Gen) 32G X 4 MM MISC For use with insulin pen. Pharmacy may dispense brand covered by insurance.  5/19/22   Morris Nash MD   losartan (COZAAR) 100 MG tablet  3/22/23   Historical Provider, MD   Multiple Vitamin (multivitamin) capsule Take 1 capsule by mouth daily 1/3/21   Marnie Fountain MD   oxyCODONE (ROXICODONE) 10 MG TABS Take 1 tablet (10 mg total) by mouth every 6 (six) hours as needed for severe pain for up to 10 days Max Daily Amount: 40 mg 23  Alysia Poon PA-C   traZODone (DESYREL) 50 mg tablet bedtime 22   Historical Provider, MD   triamterene-hydrochlorothiazide (DYAZIDE) 37.5-25 mg per capsule Take 1 capsule by mouth every morning    Historical Provider, MD   fluticasone-salmeterol (ADVAIR HFA) 115-21 MCG/ACT inhaler Inhale 2 puffs 2 (two) times a day Rinse mouth after use. Patient not taking: Reported on 7/15/2021  5/17/22  Historical Provider, MD Sarah Godoy 25-5 MG TABS  10/22/20 5/17/22  Historical Provider, MD     I have reviewed home medications using recent Epic encounter. Allergies:    Allergies   Allergen Reactions   • Sulfa Antibiotics Hives       Social History:  Marital Status: /Civil Union   Occupation:   Patient Pre-hospital Living Situation: Home, With spouse  Patient Pre-hospital Level of Mobility: walks  Patient Pre-hospital Diet Restrictions:   Substance Use History:   Social History     Substance and Sexual Activity   Alcohol Use Not Currently    Comment: quit      Social History     Tobacco Use   Smoking Status Former   • Packs/day: 0.50   • Years: 20.00   • Total pack years: 10.00   • Types: Cigarettes   • Quit date:    • Years since quittin.4   Smokeless Tobacco Never     Social History     Substance and Sexual Activity   Drug Use Never       Family History:  Family History   Problem Relation Age of Onset   • Diabetes Mother    • Supraventricular tachycardia Mother    • COPD Father    • Heart disease Father    • Other Father         Sepsis; related to UTI with complicating cardiac problems   • Heart disease Paternal Grandmother        Physical Exam:     Vitals:   Blood Pressure: 135/64 (23 0705)  Pulse: 78 (23 0705)  Temperature: 98.5 °F (36.9 °C) (23 0206)  Temp Source: Oral (06/20/23 0206)  Respirations: 16 (06/20/23 0705)  SpO2: 99 % (06/20/23 0705)    Physical Exam     Additional Data:     Lab Results:  Results from last 7 days   Lab Units 06/20/23  0339   WBC Thousand/uL 3.91*   HEMOGLOBIN g/dL 9.0*   HEMATOCRIT % 28.9*   PLATELETS Thousands/uL 91*   NEUTROS PCT % 64   LYMPHS PCT % 16   MONOS PCT % 12   EOS PCT % 7*     Results from last 7 days   Lab Units 06/20/23  0339   SODIUM mmol/L 134*   POTASSIUM mmol/L 4.3   CHLORIDE mmol/L 104   CO2 mmol/L 25   BUN mg/dL 28*   CREATININE mg/dL 2.29*   ANION GAP mmol/L 5   CALCIUM mg/dL 8.7   ALBUMIN g/dL 3.2*   TOTAL BILIRUBIN mg/dL 0.87   ALK PHOS U/L 161*   ALT U/L 27   AST U/L 40*   GLUCOSE RANDOM mg/dL 127         Results from last 7 days   Lab Units 06/19/23  1105 06/19/23  0716 06/18/23  2110 06/18/23  1617 06/18/23  1220 06/18/23  1127 06/18/23  0643 06/17/23  2100 06/17/23  1659 06/17/23  1123 06/17/23  0757 06/16/23  2039   POC GLUCOSE mg/dl 259* 198* 217* 264* 247* 283* 184* 297* 249* 267* 244* 256*               Lines/Drains:  Invasive Devices     Peripherally Inserted Central Catheter Line  Duration           PICC Line 71/30/76 Right Basilic 5 days          Peripheral Intravenous Line  Duration           Peripheral IV 06/20/23 Dorsal (posterior); Right Forearm <1 day          Drain  Duration           Urethral Catheter Non-latex 16 Fr. <1 day              Urinary Catheter:  Goal for removal: Remove after 48 hrs of I/O monitoring         Central Line:  Goal for removal: N/A - Discharging with PICC for IV ABX/medications           Imaging: Reviewed radiology reports from this admission including: abdominal/pelvic CT  CT abdomen pelvis wo contrast   Final Result by Ifrah Chavez MD (06/20 7171)      No hydronephrosis. Cirrhotic liver with portal hypertension. Splenomegaly. Trace ascites. Cholelithiasis.             Workstation performed: [id number]         XR chest 1 view portable   ED Interpretation by Jermain Powell MD (06/20 0623)   No acute cardiopulmonary process noted. EKG and Other Studies Reviewed on Admission:   · EKG: NSR. HR 79, QTc 449. ** Please Note: This note has been constructed using a voice recognition system.  **

## 2023-06-20 NOTE — ED NOTES
This RN reached out to pharmacy regarding the antibiotic that was requested and due for 1000, but pharmacy stated that this is a delicate drug and the powder takes "a while" to dissolve. Pharmacy unsure of when this will be done r/t hanging antibiotic at scheduled time.       Xochilt Pike RN  06/20/23 1000

## 2023-06-20 NOTE — ASSESSMENT & PLAN NOTE
· Patient is s/p reverse total shoulder arthoplasty on 4/423 and s/p I&D of left shoulder on 5/2/23 with recent admission at [facility] for left shoulder surgical site infection with abscess and possible prosthetic infection and is now s/p explantation and spacer placement on 6/13/23 with cultures growing cutibacterium avidum and staphylococcus epidermidis. · Discharged from [facility] on 6/19/23 on IV daptomycin 700 mg q24h for 6 weeks (until 7/28/23). Continue IV daptomycin.

## 2023-06-20 NOTE — PLAN OF CARE
Problem: PAIN - ADULT  Goal: Verbalizes/displays adequate comfort level or baseline comfort level  Description: Interventions:  - Encourage patient to monitor pain and request assistance  - Assess pain using appropriate pain scale  - Administer analgesics based on type and severity of pain and evaluate response  - Implement non-pharmacological measures as appropriate and evaluate response  - Consider cultural and social influences on pain and pain management  - Notify physician/advanced practitioner if interventions unsuccessful or patient reports new pain  Outcome: Progressing     Problem: INFECTION - ADULT  Goal: Absence or prevention of progression during hospitalization  Description: INTERVENTIONS:  - Assess and monitor for signs and symptoms of infection  - Monitor lab/diagnostic results  - Monitor all insertion sites, i.e. indwelling lines, tubes, and drains  - Monitor endotracheal if appropriate and nasal secretions for changes in amount and color  - [place] appropriate cooling/warming therapies per order  - Administer medications as ordered  - Instruct and encourage patient and family to use good hand hygiene technique  - Identify and instruct in appropriate isolation precautions for identified infection/condition  Outcome: Progressing     Problem: SAFETY ADULT  Goal: Patient will remain free of falls  Description: INTERVENTIONS:  - Educate patient/family on patient safety including physical limitations  - Instruct patient to call for assistance with activity   - Consult OT/PT to assist with strengthening/mobility   - Keep Call bell within reach  - Keep bed low and locked with side rails adjusted as appropriate  - Keep care items and personal belongings within reach  - Initiate and maintain comfort rounds  - Make Fall Risk Sign visible to staff  - Offer Toileting every 2 Hours, in advance of need  - Initiate/Maintain 2alarm  - Obtain necessary fall risk management equipment:   - Apply yellow socks and bracelet for high fall risk patients  - Consider moving patient to room near nurses station  Outcome: Progressing  Goal: Maintain or return to baseline ADL function  Description: INTERVENTIONS:  -  Assess patient's ability to carry out ADLs; assess patient's baseline for ADL function and identify physical deficits which impact ability to perform ADLs (bathing, care of mouth/teeth, toileting, grooming, dressing, etc.)  - Assess/evaluate cause of self-care deficits   - Assess range of motion  - Assess patient's mobility; develop plan if impaired  - Assess patient's need for assistive devices and provide as appropriate  - Encourage maximum independence but intervene and supervise when necessary  - Involve family in performance of ADLs  - Assess for home care needs following discharge   - Consider OT consult to assist with ADL evaluation and planning for discharge  - Provide patient education as appropriate  Outcome: Progressing  Goal: Maintains/Returns to pre admission functional level  Description: INTERVENTIONS:  - Perform BMAT or MOVE assessment daily.   - Set and communicate daily mobility goal to care team and patient/family/caregiver. - Collaborate with rehabilitation services on mobility goals if consulted  - Perform Range of Motion  times a day. - Reposition patient every  hours.   - Dangle patient  times a day  - Stand patient 2 times a day  - Ambulate patient 2 times a day  - Out of bed to chair 2 times a day   - Out of bed for meals 2 times a day  - Out of bed for toileting  - Record patient progress and toleration of activity level   Outcome: Progressing     Problem: DISCHARGE PLANNING  Goal: Discharge to home or other facility with appropriate resources  Description: INTERVENTIONS:  - Identify barriers to discharge w/patient and caregiver  - Arrange for needed discharge resources and transportation as appropriate  - Identify discharge learning needs (meds, wound care, etc.)  - Arrange for interpretive services to assist at discharge as needed  - Refer to Case Management Department for coordinating discharge planning if the patient needs post-hospital services based on physician/advanced practitioner order or complex needs related to functional status, cognitive ability, or social support system  Outcome: Progressing     Problem: Knowledge Deficit  Goal: Patient/family/caregiver demonstrates understanding of disease process, treatment plan, medications, and discharge instructions  Description: Complete learning assessment and assess knowledge base.   Interventions:  - Provide teaching at level of understanding  - Provide teaching via preferred learning methods  Outcome: Progressing

## 2023-06-20 NOTE — ASSESSMENT & PLAN NOTE
Lab Results   Component Value Date    HGBA1C 8.4 (H) 03/20/2023       Recent Labs     06/18/23  1617 06/18/23  2110 06/19/23  0716 06/19/23  1105   POCGLU 264* 217* 198* 259*       Blood Sugar Average: Last 72 hrs:     • Hold Amaryl while inpatient. • Monitor accu-checks AC and HS. • Continue home insulin regimen with Lantus HS  • SSI coverage. • Hypoglycemia protocol.

## 2023-06-20 NOTE — CONSULTS
History of Present Illness   Physician Requesting Consult: Abraham Dickson MD    Reason for Consult / Principal Problem: acute kidney injury       Assessment:     1. Acute on chronic kidney injury  · Creatinine on admission 2.29. Baseline creatinine is 0.9-1.2. Repeated episodes of ABILIO in the past, most recent in May ( cr:2. 5)  · BUN 28; GFR 28  · UA  Unremarkable   · CT abdomen pelvis showed no hydronephrosis or calculi, mild bilateral perinephric stranding that can be seen in the setting of renal insufficiency, enlarged prostate, urinary bladder decompressed by Tejeda catheter  · Per [facility] notes, patient was noted to be retaining urine on 6/15 status post straight cath x1, since then has been voiding without difficulty. Patient was discharged on 6/19 from [facility], and patient mentioned he was retaining urine for 12 hours since discharge. Patient denied any suprapubic pressure/pain, urgency. On presentation patient had Tejeda placed, currently ~400 ml urine in the bag, concentrated   · Patient on losartan and triamterene/hydrochlorothiazide as home medication. Per patient, he didn't take Losartan or dyazide in the hospital, but took Dyazide for LE edema. · Unclear etiology for ABILIO: prerenal 2/2 to poor oral intake and dyazide use, hepatorenal syndrome ( pt with cirrhosis and small ascites); ATN not yet excluded; postrenal -obstructive most likely Patient on Daptomycin that can cause rhabdomyolysis and ABILIO, however urine not consistent with rhabdomyolysis     2. Acid base disorder   ·  CO2: 25- normal     3. Hypertension  · Home medication Cardizem 180 mg daily, losartan 100 mg a day, triamterene/hydrochlorothiazide (Dyazide)37.5 - 25 mg daily  · Losartan and Dyazide was held since admission  · Blood pressure 119//64 mmHg    4. Anemia   · Hb 9.0, lower than patient's baseline  · Iron studies in march consistent with anemia of chronic disease   · Monitor     5.  Type 2 diabetes mellitus   · Not well controlled   · Last Hb A1c: 8.4     6. Obesity and MEG on CPAP     7. Thrombocytopenia   · Chronic, likely 2/2 cirrhosis     8. Alcoholic cirrhosis   · CT showed cirrhotic liver with portal hypertension and splenomegaly. · F/u with Gi as outpatient     9. Postop infection of surgical wound  · Status post reverse total left shoulder arthroplasty on 4/23 and s/p I&D on left shoulder on 5/2/2023 for side wound infection. Patient was recently admitted at [facility] for left shoulder surgical site infection with abscess and possible prostatic infection, culture grew cutibacterium avidum. Status post left shoulder resection and revision/arthroplasty and insertion of antibiotic spacer 6/13. Patient was discharged on IV daptomycin for a 6-week course    10. Lower extremity edema   · Worsen, per patient. Changes similar to venous insufficiency   · Echo 6/23: EF 60%, DD1, mild Ao stenosis     Plan:   · Gentle iv hydration with normal saline/plasmalyte with 75 cc.h for 10 h. · Check creatinine am   · Continue to hold losartan, Dyazide  · Avoid nephrotoxins/hypotension  · Strict intake output  · Renally dosed IV antibiotic    Will discuss with my attending and will let primary team know about final plan     HPI: Samira Henning is a 69y.o. year old male with past medical history of type 2 diabetes, hypertension, CKD stage III, alcoholic liver cirrhosis, recent left shoulder arthroplasty and surgical site infection x2 who presents secondary to decreased urine output. Patient was discharged from [facility] on 6/19, for left shoulder surgical site infection and prostatic infection. Per [facility] notes patient was retaining urine and needed straight cath during the admission however since 6/15 was voiding without difficulty. Per patient he was straight cath twice ( on the 15 and the next day) but voided since. Patient reported he had no urine output for 12 hours since discharge. Denied any suprapubic discomfort or pain, no urgency.  He mentioned that he didn't feel like going to urinate, but tried once and only had a few dropes of urine. Patient said he drank 3 glasses of iced tea after discharge and took his water pill, because felt his legs were more swollen than before. History obtained from chart review and the patient      Review of systems:  Review of Systems   Constitutional: Negative for appetite change. Respiratory: Negative for cough, chest tightness and shortness of breath. Cardiovascular: Positive for leg swelling. Negative for chest pain. Gastrointestinal: Negative for abdominal distention, diarrhea, nausea and vomiting. Genitourinary: Positive for decreased urine volume and difficulty urinating. Negative for dysuria, frequency, hematuria and urgency. All other systems reviewed and are negative. Historical Information   Past Medical History:   Diagnosis Date   • Arrhythmia    • CPAP (continuous positive airway pressure) dependence    • Diabetes mellitus ([address])    • History of echocardiogram 11/14/2017    showed EF of 50-55 percentWith moderate LVH and left ventricle diastolic dysfunction. Left atrium was moderately enlarged. Trace MR noted. • History of Holter monitoring 11/21/2017    showed baseline rhythm of sinus origin with an average heart it of 61 bpm. The lowest heart rate was 49 and the highest heart rate was 10 8 bpm. There were rare single VPCs, and frequent PACs representing 3.2% of total beats. There were several episodes of sinus arrhythmias with sinus bradycardia and heart rate ranging from 40-90 bpm. No sustained dysrhythmias, or pauses noted.  The patient did not   • Hypertension    • Liver disease    • Obese    • Sleep apnea      Past Surgical History:   Procedure Laterality Date   • CATARACT EXTRACTION     • EYE SURGERY Left 1997   • HERNIA REPAIR  [id number]   • KNEE ARTHROPLASTY Right 2008   • NC ARTHROPLASTY GLENOHUMERAL JOINT TOTAL SHOULDER Left 4/4/2023    Procedure: ARTHROPLASTY SHOULDER REVERSE;  Surgeon: Jesus Tim MD;  Location: [facility];  Service: Orthopedics   • SHOULDER SURGERY Right    • WOUND DEBRIDEMENT Left 2023    Procedure: INCISION AND DRAINAGE (I&D) EXTREMITY, vac placement;  Surgeon: Jesus Tim MD;  Location: [facility];  Service: Orthopedics     Social History   Social History     Substance and Sexual Activity   Alcohol Use Not Currently    Comment: quit      Social History     Substance and Sexual Activity   Drug Use Never     Social History     Tobacco Use   Smoking Status Former   • Packs/day: 0.50   • Years: 20.00   • Total pack years: 10.00   • Types: Cigarettes   • Quit date:    • Years since quittin.4   Smokeless Tobacco Never     Family History   Problem Relation Age of Onset   • Diabetes Mother    • Supraventricular tachycardia Mother    • COPD Father    • Heart disease Father    • Other Father         Sepsis; related to UTI with complicating cardiac problems   • Heart disease Paternal Grandmother        Meds/Allergies   all current active meds have been reviewed, current meds:   Current Facility-Administered Medications   Medication Dose Route Frequency   • DAPTOmycin (CUBICIN) 700 mg in sodium chloride 0.9 % 50 mL IVPB  6 mg/kg (Order-Specific) Intravenous Q24H   • diltiazem (CARDIZEM CD) 24 hr capsule 180 mg  180 mg Oral Daily   • heparin (porcine) subcutaneous injection 5,000 Units  5,000 Units Subcutaneous Q8H [address]   • insulin glargine (LANTUS) subcutaneous injection 30 Units 0.3 mL  30 Units Subcutaneous HS   • insulin lispro (HumaLOG) 100 units/mL subcutaneous injection 1-5 Units  1-5 Units Subcutaneous HS   • insulin lispro (HumaLOG) 100 units/mL subcutaneous injection 2-12 Units  2-12 Units Subcutaneous TID AC   • oxyCODONE (ROXICODONE) immediate release tablet 10 mg  10 mg Oral Q6H PRN   • traZODone (DESYREL) tablet 50 mg  50 mg Oral HS PRN    and PTA meds:  (Not in a hospital admission)      Allergies   Allergen Reactions   • Sulfa Antibiotics Hives       Objective     Intake/Output Summary (Last 24 hours) at 6/20/2023 0918  Last data filed at 6/20/2023 0529  Gross per 24 hour   Intake 500 ml   Output --   Net 500 ml     Patient Vitals for the past 24 hrs:   BP Temp Temp src Pulse Resp SpO2   06/20/23 0900 119/56 -- -- 68 16 98 %   06/20/23 0800 122/56 -- -- 73 16 98 %   06/20/23 0705 135/64 -- -- 78 16 99 %   06/20/23 0206 128/58 98.5 °F (36.9 °C) Oral -- -- --   06/20/23 0205 -- -- -- 85 18 98 %       Invasive Devices:   Urethral Catheter Non-latex 16 Fr. (Active)     Vitals:    06/20/23 0900   BP: 119/56   Pulse: 68   Resp: 16   Temp:    SpO2: 98%       Physical Exam  Vitals reviewed. Constitutional:       General: He is not in acute distress. Appearance: He is obese. He is not diaphoretic. HENT:      Head: Normocephalic. Eyes:      General: No scleral icterus. Right eye: No discharge. Left eye: No discharge. Cardiovascular:      Rate and Rhythm: Normal rate and regular rhythm. Pulmonary:      Effort: No respiratory distress. Breath sounds: Normal breath sounds. No wheezing, rhonchi or rales. Abdominal:      General: There is distension. Palpations: Abdomen is soft. Tenderness: There is no abdominal tenderness. There is no guarding or rebound. Genitourinary:     Comments: Tejeda in place   Musculoskeletal:      Right lower leg: Edema present. Left lower leg: Edema present. Skin:     General: Skin is warm. Neurological:      Mental Status: He is alert. Psychiatric:         Mood and Affect: Mood normal.         Behavior: Behavior normal.         Thought Content: Thought content normal.         Judgment: Judgment normal.         Current Weight:    First Weight:      Lab Results:  I have personally reviewed pertinent labs.   CBC:   Lab Results   Component Value Date    WBC 3.91 (L) 06/20/2023    RBC 3.31 (L) 06/20/2023     CMP:   Lab Results   Component Value Date  06/20/2023    CO2 25 06/20/2023    BUN 28 (H) 06/20/2023    CREATININE 2.29 (H) 06/20/2023    CALCIUM 8.7 06/20/2023    AST 40 (H) 06/20/2023    ALT 27 06/20/2023    ALKPHOS 161 (H) 06/20/2023    EGFR 28 06/20/2023     Phosphorus: No results found for: "PHOS"  Magnesium:   Lab Results   Component Value Date    MG 2.0 12/29/2020     Urinalysis:   Lab Results   Component Value Date    COLORU Yellow 06/20/2023    CLARITYU Clear 06/20/2023    SPECGRAV 1.008 06/20/2023    PHUR 6.0 06/20/2023    LEUKOCYTESUR Negative 06/20/2023    NITRITE Negative 06/20/2023    GLUCOSEU Negative 06/20/2023    KETONESU Negative 06/20/2023    BILIRUBINUR Negative 06/20/2023    BLOODU Small (A) 06/20/2023     BMP:   Lab Results   Component Value Date    SODIUM 134 (L) 06/20/2023    CO2 25 06/20/2023    BUN 28 (H) 06/20/2023    CREATININE 2.29 (H) 06/20/2023    CALCIUM 8.7 06/20/2023     ABGs: No results found for: "PH"  Radiology review: CT AP     EKG, Pathology, and Other Studies:    Portions of the record may have been created with voice recognition software. Occasional wrong word or "sound a like" substitutions may have occurred due to the inherent limitations of voice recognition software. Read the chart carefully and recognize, using context, where substitutions have occurred.

## 2023-06-20 NOTE — NURSING NOTE
Message sent to provider via [employer]t from this RN at [phone] regarding patient's shoulder dressing weeping bloody drainage. ABD used to enforce dressing until further orders from provider. Patient aware and verbalizes understanding. Will monitor dressing.

## 2023-06-21 ENCOUNTER — TELEPHONE (OUTPATIENT)
Dept: OBGYN CLINIC | Facility: HOSPITAL | Age: 68
End: 2023-06-21

## 2023-06-21 LAB
ANION GAP SERPL CALCULATED.3IONS-SCNC: 5 MMOL/L
BACTERIA SPEC ANAEROBE CULT: ABNORMAL
BACTERIA TISS AEROBE CULT: ABNORMAL
BACTERIA UR QL AUTO: ABNORMAL /HPF
BASOPHILS # BLD AUTO: 0.01 THOUSANDS/ÂΜL (ref 0–0.1)
BASOPHILS NFR BLD AUTO: 0 % (ref 0–1)
BILIRUB UR QL STRIP: NEGATIVE
BUN SERPL-MCNC: 38 MG/DL (ref 5–25)
CALCIUM SERPL-MCNC: 8.1 MG/DL (ref 8.4–10.2)
CHLORIDE SERPL-SCNC: 106 MMOL/L (ref 96–108)
CK SERPL-CCNC: 44 U/L (ref 39–308)
CLARITY UR: CLEAR
CO2 SERPL-SCNC: 23 MMOL/L (ref 21–32)
COLOR UR: ABNORMAL
CREAT SERPL-MCNC: 4.07 MG/DL (ref 0.6–1.3)
EOSINOPHIL # BLD AUTO: 0.17 THOUSAND/ÂΜL (ref 0–0.61)
EOSINOPHIL NFR BLD AUTO: 5 % (ref 0–6)
ERYTHROCYTE [DISTWIDTH] IN BLOOD BY AUTOMATED COUNT: 16.1 % (ref 11.6–15.1)
GFR SERPL CREATININE-BSD FRML MDRD: 14 ML/MIN/1.73SQ M
GLUCOSE SERPL-MCNC: 143 MG/DL (ref 65–140)
GLUCOSE SERPL-MCNC: 158 MG/DL (ref 65–140)
GLUCOSE SERPL-MCNC: 169 MG/DL (ref 65–140)
GLUCOSE SERPL-MCNC: 62 MG/DL (ref 65–140)
GLUCOSE SERPL-MCNC: 82 MG/DL (ref 65–140)
GLUCOSE UR STRIP-MCNC: NEGATIVE MG/DL
GRAM STN SPEC: ABNORMAL
GRAM STN SPEC: ABNORMAL
HCT VFR BLD AUTO: 25.4 % (ref 36.5–49.3)
HGB BLD-MCNC: 8 G/DL (ref 12–17)
HGB UR QL STRIP.AUTO: ABNORMAL
IMM GRANULOCYTES # BLD AUTO: 0.01 THOUSAND/UL (ref 0–0.2)
IMM GRANULOCYTES NFR BLD AUTO: 0 % (ref 0–2)
KETONES UR STRIP-MCNC: NEGATIVE MG/DL
LEUKOCYTE ESTERASE UR QL STRIP: ABNORMAL
LYMPHOCYTES # BLD AUTO: 0.75 THOUSANDS/ÂΜL (ref 0.6–4.47)
LYMPHOCYTES NFR BLD AUTO: 21 % (ref 14–44)
MCH RBC QN AUTO: 27.3 PG (ref 26.8–34.3)
MCHC RBC AUTO-ENTMCNC: 31.5 G/DL (ref 31.4–37.4)
MCV RBC AUTO: 87 FL (ref 82–98)
MONOCYTES # BLD AUTO: 0.52 THOUSAND/ÂΜL (ref 0.17–1.22)
MONOCYTES NFR BLD AUTO: 15 % (ref 4–12)
NEUTROPHILS # BLD AUTO: 2.07 THOUSANDS/ÂΜL (ref 1.85–7.62)
NEUTS SEG NFR BLD AUTO: 59 % (ref 43–75)
NITRITE UR QL STRIP: NEGATIVE
NON-SQ EPI CELLS URNS QL MICRO: ABNORMAL /HPF
NRBC BLD AUTO-RTO: 0 /100 WBCS
PH UR STRIP.AUTO: 5.5 [PH]
PLATELET # BLD AUTO: 86 THOUSANDS/UL (ref 149–390)
PMV BLD AUTO: 12.3 FL (ref 8.9–12.7)
POTASSIUM SERPL-SCNC: 4.4 MMOL/L (ref 3.5–5.3)
PROT UR STRIP-MCNC: NEGATIVE MG/DL
RBC # BLD AUTO: 2.93 MILLION/UL (ref 3.88–5.62)
RBC #/AREA URNS AUTO: ABNORMAL /HPF
SODIUM SERPL-SCNC: 134 MMOL/L (ref 135–147)
SP GR UR STRIP.AUTO: 1 (ref 1–1.03)
UROBILINOGEN UR STRIP-ACNC: <2 MG/DL
WBC # BLD AUTO: 3.53 THOUSAND/UL (ref 4.31–10.16)
WBC #/AREA URNS AUTO: ABNORMAL /HPF

## 2023-06-21 PROCEDURE — 99232 SBSQ HOSP IP/OBS MODERATE 35: CPT | Performed by: INTERNAL MEDICINE

## 2023-06-21 PROCEDURE — 82948 REAGENT STRIP/BLOOD GLUCOSE: CPT

## 2023-06-21 PROCEDURE — 82550 ASSAY OF CK (CPK): CPT | Performed by: INTERNAL MEDICINE

## 2023-06-21 PROCEDURE — 81001 URINALYSIS AUTO W/SCOPE: CPT | Performed by: INTERNAL MEDICINE

## 2023-06-21 PROCEDURE — 85025 COMPLETE CBC W/AUTO DIFF WBC: CPT | Performed by: PHYSICIAN ASSISTANT

## 2023-06-21 PROCEDURE — 80048 BASIC METABOLIC PNL TOTAL CA: CPT | Performed by: PHYSICIAN ASSISTANT

## 2023-06-21 PROCEDURE — 99233 SBSQ HOSP IP/OBS HIGH 50: CPT | Performed by: INTERNAL MEDICINE

## 2023-06-21 PROCEDURE — 99024 POSTOP FOLLOW-UP VISIT: CPT | Performed by: PHYSICIAN ASSISTANT

## 2023-06-21 RX ORDER — INSULIN GLARGINE 100 [IU]/ML
20 INJECTION, SOLUTION SUBCUTANEOUS
Status: DISCONTINUED | OUTPATIENT
Start: 2023-06-21 | End: 2023-06-28 | Stop reason: HOSPADM

## 2023-06-21 RX ORDER — DIPHENHYDRAMINE HYDROCHLORIDE, ZINC ACETATE 2; .1 G/100G; G/100G
CREAM TOPICAL 3 TIMES DAILY PRN
Status: DISCONTINUED | OUTPATIENT
Start: 2023-06-21 | End: 2023-06-28 | Stop reason: HOSPADM

## 2023-06-21 RX ADMIN — HEPARIN SODIUM 5000 UNITS: 5000 INJECTION INTRAVENOUS; SUBCUTANEOUS at 21:33

## 2023-06-21 RX ADMIN — HEPARIN SODIUM 5000 UNITS: 5000 INJECTION INTRAVENOUS; SUBCUTANEOUS at 05:53

## 2023-06-21 RX ADMIN — TRAZODONE HYDROCHLORIDE 50 MG: 50 TABLET ORAL at 21:42

## 2023-06-21 RX ADMIN — DAPTOMYCIN 700 MG: 500 INJECTION, POWDER, LYOPHILIZED, FOR SOLUTION INTRAVENOUS at 10:25

## 2023-06-21 RX ADMIN — INSULIN LISPRO 2 UNITS: 100 INJECTION, SOLUTION INTRAVENOUS; SUBCUTANEOUS at 12:30

## 2023-06-21 RX ADMIN — TAMSULOSIN HYDROCHLORIDE 0.4 MG: 0.4 CAPSULE ORAL at 16:11

## 2023-06-21 RX ADMIN — INSULIN GLARGINE 20 UNITS: 100 INJECTION, SOLUTION SUBCUTANEOUS at 21:33

## 2023-06-21 RX ADMIN — SODIUM CHLORIDE 75 ML/HR: 0.9 INJECTION, SOLUTION INTRAVENOUS at 10:24

## 2023-06-21 RX ADMIN — OXYCODONE HYDROCHLORIDE 10 MG: 10 TABLET ORAL at 21:42

## 2023-06-21 RX ADMIN — HEPARIN SODIUM 5000 UNITS: 5000 INJECTION INTRAVENOUS; SUBCUTANEOUS at 14:40

## 2023-06-21 RX ADMIN — INSULIN LISPRO 1 UNITS: 100 INJECTION, SOLUTION INTRAVENOUS; SUBCUTANEOUS at 21:33

## 2023-06-21 NOTE — TELEPHONE ENCOUNTER
Patient was readmitted to the hospital ([facility])  Spoke with patient's wife who states bandage does exhibit some saturation  Advised that they talk to the nurse to tell [facility] to consult orthopedics while admitted for further evaluation and wound management  They understand and will talk to the nurse

## 2023-06-21 NOTE — PROGRESS NOTES
[facility]  Progress Note  Name: Natalie Godinez  MRN: [id number]  Unit/Bed#: -01 I Date of Admission: 6/20/2023   Date of Service: 6/21/2023 I Hospital Day: 1    Assessment/Plan   * ABILIO (acute kidney injury) ([address])  Assessment & Plan  · Initial suspicion for ABILIO was suspected secondary to urinary retention however in spite of getting a Tejeda catheter and no hydronephrosis or bladder urine retention-patient's creatinine is still getting worse. Today creatinine is 4.09. Baseline creatinine is around 1.1-0.9. Most likely etiology for ABILIO seems to be ATN/interstitial nephritis from unclear etiology. Follow-up nephrology recommendations. Continue IV fluid. · No plan for HD for now. Patient is symptom wise feeling better. · Continue to monitor the patient on Tejeda catheter and monitor I's and O's. · Plan to check CK levels. Last CK levels were stable at 107. · Hold Dyazide and losartan. · Follow-up nephrology recommendations. Postoperative infection of surgical wound  Assessment & Plan  · Patient is s/p reverse total shoulder arthoplasty on 4/423 and s/p I&D of left shoulder on 5/2/23 with recent admission at [facility] for left shoulder surgical site infection with abscess and possible prosthetic infection and is now s/p explantation and spacer placement on 6/13/23 with cultures growing cutibacterium avidum and staphylococcus epidermidis. · Discharged from \[facility]\"" on 6/19/23 on IV daptomycin 700 mg q24h for 6 weeks (until 7/28/23). Continue IV daptomycin. · Some discharge from the left shoulder noted. Consulted orthopedic to evaluate for any need for intervention or wound care recommendations. Bilateral lower extremity edema  Assessment & Plan  · Patient reports worsening b/l lower extremity edema since his recent hospitalization. · RLE edema > LLE on exam which patient reports is typical for his edema. · BNP mildly elevated at 259.    · Recent echo on 6/16/23 with EF 60%, G1DD, mild AS. · Not on Lasix as an outpatient; prescribed Dyazide which will be held in the setting of ABILIO. · Obtain venous doppler to r/o DVT in the setting of recent hospitalization. · Elevate extremities. Insulin-dependent diabetes mellitus  Assessment & Plan  Lab Results   Component Value Date    HGBA1C 8.4 (H) 03/20/2023       Recent Labs     06/20/23  1618 06/20/23  2105 06/21/23  0733 06/21/23  1107   POCGLU 189* 165* 82 158*       Blood Sugar Average: Last 72 hrs:  (P) 138.5   • Hold Amaryl while inpatient. • Monitor accu-checks AC and HS. • Continue home insulin regimen with Lantus HS -morning Accu-Chek was low, given the fact patient's creatinine worsened. Decrease the dose of patient's home dose of Lantus and lispro as well. • SSI coverage. • Hypoglycemia protocol. Pancytopenia ([address])  Assessment & Plan  · In the setting of cirrhosis  · Platelet count stable at 91. · Hgb 9.0 from 9.6 yesterday. · Leukopenia stable. · Continue to monitor CBC. MEG (obstructive sleep apnea)  Assessment & Plan  · Continue CPAP HS. Pharmacologic VTE Prophylaxis: Yes Heparin sc. Mechanical VTE Prophylaxis in Place: No   Patient Centered Rounds: I have performed bedside rounds with the Nursing staff today. Current Length of Stay: 1 day(s)  Current Patient Status: Inpatient   Code Status: Level 1 - Full Code  Time Spent for Care:  35 minutes. More than 50% of total time spent on counseling and coordination of care as described above. Discussions with Specialists or Other Care Team Provider: Yes Nephrology  Education and Discussions with Family / Patient: Yes, Updated family member. Wife  At the bedside  Discharge Plan: > 72 hrs. Case Discussed with  Kesha Noland regarding updating plan of care and disposition planning. > 72 hrs  Certification Statement: The patient will continue to require additional inpatient hospital stay due to ABILIO/ATN/AIN, Leg edema. Subjective:   I have seen and Examined the patient at the bedside. No CP or Sob. No fevers or chills, No nausea or vomiting. Overnight events reviewed with the RN. No Other complains. Patient complains of bilateral leg swelling slightly getting worse or at least not improving. Denies any abdominal pain nausea or vomiting. Appetite is stable. Review of System:   Denies any CP or SOB  Denies any Cough or Cold  Denies any Fevers or chills. Denies any focal tingling numbness or weakness in any extremities. Denies any abdominal pain, Nausea or vomiting. Objective:   Temp (24hrs), Av.4 °F (36.9 °C), Min:98 °F (36.7 °C), Max:98.8 °F (37.1 °C)    Temp:  [98 °F (36.7 °C)-98.8 °F (37.1 °C)] 98.8 °F (37.1 °C)  HR:  [79-95] 95  Resp:  [18-19] 18  BP: (123-125)/(60-63) 123/60  SpO2:  [96 %-98 %] 96 %  There is no height or weight on file to calculate BMI. Input and Output Summary (last 24 hours): Intake/Output Summary (Last 24 hours) at 2023 1326  Last data filed at 2023 1211  Gross per 24 hour   Intake 600 ml   Output 1850 ml   Net -1250 ml     I/O        0701   0700  0701   0700  0701   0700    P. O.  960     IV Piggyback 500      Total Intake 500 960     Urine  1200 650    Total Output  1200 650    Net +500 -240 -650                 Physical Exam:   General Exam: Alert and Oriented x 3, NAD, morbidly obese. Eyes: POLA  Neck: Supple. Short neck. CVS: S1, S2 Ness, RRR.   R/S: Clear to auscultate anteriorly. Abd: Soft, NT, ND, BS+ve  Extremities: Left arm in a sling. Bilateral lower extremity chronic venous stasis changes noted. Bilateral extremity 2+ pitting edema up to mid shin noted. Skin: Bilateral lower extremity chronic venous stasis changes noted. CNS: No acute FND. Moves all 4 extremities. Psych: Co-operative, Not agitated.  exam: Tejeda catheter draining clear urine.     Additional Data:     Labs, Culture & Imaging Data Reviewed:    Results from last 7 days   Lab Units 06/21/23  0451   WBC Thousand/uL 3.53*   HEMOGLOBIN g/dL 8.0*   HEMATOCRIT % 25.4*   PLATELETS Thousands/uL 86*     Results from last 7 days   Lab Units 06/21/23  0451 06/20/23  1811 06/20/23  0339   POTASSIUM mmol/L 4.4   < > 4.3   CHLORIDE mmol/L 106   < > 104   CO2 mmol/L 23   < > 25   BUN mg/dL 38*   < > 28*   CREATININE mg/dL 4.07*   < > 2.29*   CALCIUM mg/dL 8.1*   < > 8.7   ALK PHOS U/L  --   --  161*   ALT U/L  --   --  27   AST U/L  --   --  40*    < > = values in this interval not displayed. Lab Results   Component Value Date    HGBA1C 8.4 (H) 03/20/2023      VAS lower limb venous duplex study, complete bilateral   Final Result by Vale Martinez MD (06/20 5528)      CT abdomen pelvis wo contrast   Final Result by Danie Pastrana MD (06/20 2346)      No hydronephrosis. Cirrhotic liver with portal hypertension. Splenomegaly. Trace ascites. Cholelithiasis. Workstation performed: [id number]         XR chest 1 view portable   ED Interpretation by Tamara Monet MD (06/20 0407)   No acute cardiopulmonary process noted. Final Result by Js Johnston MD (06/20 1165)      No acute cardiopulmonary disease. Workstation performed: [id number]             Cultures:   Blood Culture:   Lab Results   Component Value Date    BLOODCX No Growth After 5 Days. 09/13/2022    BLOODCX No Growth After 5 Days.  09/13/2022     Urine Culture: No results found for: "URINECX"  Sputum Culture: No components found for: "Eliverto Muzzy"  Wound Culture:   Lab Results   Component Value Date    WOUNDCULT 1+ Growth of Klebsiella oxytoca (A) 09/14/2022    WOUNDCULT 1+ Growth of 09/14/2022       Last 24 Hours Medication List:   Current Facility-Administered Medications   Medication Dose Route Frequency Provider Last Rate   • DAPTOmycin  6 mg/kg (Order-Specific) Intravenous Q24H Martín oClin PA-C 700 mg (06/21/23 1025)   • heparin (porcine)  5,000 Units Subcutaneous [facility] Ricci Tristan PA-C     • insulin glargine  20 Units Subcutaneous HS Kandace Trinh MD     • insulin lispro  1-5 Units Subcutaneous HS Umm Roberson PA-C     • insulin lispro  2-12 Units Subcutaneous TID AC Lou Gonzales PA-C     • oxyCODONE  10 mg Oral Q6H PRN Umm Roberson PA-C     • sodium chloride  75 mL/hr Intravenous Continuous Lois Sow MD 75 mL/hr (06/21/23 1024)   • tamsulosin  0.4 mg Oral Daily With Swapna Herron MD     • traZODone  50 mg Oral HS PRN Umm Roberson PA-C           Patient is at moderate risk for morbidity and mortality due to above mentioned illness and comorbidities.

## 2023-06-21 NOTE — PLAN OF CARE
Problem: PAIN - ADULT  Goal: Verbalizes/displays adequate comfort level or baseline comfort level  Description: Interventions:  - Encourage patient to monitor pain and request assistance  - Assess pain using appropriate pain scale  - Administer analgesics based on type and severity of pain and evaluate response  - Implement non-pharmacological measures as appropriate and evaluate response  - Consider cultural and social influences on pain and pain management  - Notify physician/advanced practitioner if interventions unsuccessful or patient reports new pain  Outcome: Progressing     Problem: INFECTION - ADULT  Goal: Absence or prevention of progression during hospitalization  Description: INTERVENTIONS:  - Assess and monitor for signs and symptoms of infection  - Monitor lab/diagnostic results  - Monitor all insertion sites, i.e. indwelling lines, tubes, and drains  - Monitor endotracheal if appropriate and nasal secretions for changes in amount and color  - [place] appropriate cooling/warming therapies per order  - Administer medications as ordered  - Instruct and encourage patient and family to use good hand hygiene technique  - Identify and instruct in appropriate isolation precautions for identified infection/condition  Outcome: Progressing     Problem: SAFETY ADULT  Goal: Patient will remain free of falls  Description: INTERVENTIONS:  - Educate patient/family on patient safety including physical limitations  - Instruct patient to call for assistance with activity   - Consult OT/PT to assist with strengthening/mobility   - Keep Call bell within reach  - Keep bed low and locked with side rails adjusted as appropriate  - Keep care items and personal belongings within reach  - Initiate and maintain comfort rounds  - Make Fall Risk Sign visible to staff  - Offer Toileting every 2 Hours, in advance of need  - Initiate/Maintain bed alarm  - Obtain necessary fall risk management equipment  - Apply yellow socks and bracelet for high fall risk patients  - Consider moving patient to room near nurses station  Outcome: Progressing  Goal: Maintain or return to baseline ADL function  Description: INTERVENTIONS:  -  Assess patient's ability to carry out ADLs; assess patient's baseline for ADL function and identify physical deficits which impact ability to perform ADLs (bathing, care of mouth/teeth, toileting, grooming, dressing, etc.)  - Assess/evaluate cause of self-care deficits   - Assess range of motion  - Assess patient's mobility; develop plan if impaired  - Assess patient's need for assistive devices and provide as appropriate  - Encourage maximum independence but intervene and supervise when necessary  - Involve family in performance of ADLs  - Assess for home care needs following discharge   - Consider OT consult to assist with ADL evaluation and planning for discharge  - Provide patient education as appropriate  Outcome: Progressing  Goal: Maintains/Returns to pre admission functional level  Description: INTERVENTIONS:  - Perform BMAT or MOVE assessment daily.   - Set and communicate daily mobility goal to care team and patient/family/caregiver. - Collaborate with rehabilitation services on mobility goals if consulted  - Perform Range of Motion 2 times a day. - Reposition patient every 2 hours.   - Dangle patient 2 times a day  - Stand patient 3 times a day  - Ambulate patient 3 times a day  - Out of bed to chair 3 times a day   - Out of bed for meals 3 times a day  - Out of bed for toileting  - Record patient progress and toleration of activity level   Outcome: Progressing     Problem: DISCHARGE PLANNING  Goal: Discharge to home or other facility with appropriate resources  Description: INTERVENTIONS:  - Identify barriers to discharge w/patient and caregiver  - Arrange for needed discharge resources and transportation as appropriate  - Identify discharge learning needs (meds, wound care, etc.)  - Arrange for interpretive services to assist at discharge as needed  - Refer to Case Management Department for coordinating discharge planning if the patient needs post-hospital services based on physician/advanced practitioner order or complex needs related to functional status, cognitive ability, or social support system  Outcome: Progressing     Problem: Knowledge Deficit  Goal: Patient/family/caregiver demonstrates understanding of disease process, treatment plan, medications, and discharge instructions  Description: Complete learning assessment and assess knowledge base.   Interventions:  - Provide teaching at level of understanding  - Provide teaching via preferred learning methods  Outcome: Progressing

## 2023-06-21 NOTE — TELEPHONE ENCOUNTER
Caller: Wife     Doctor: Jennifer Mckay     Reason for call:  is in the hospital and wife would like to discuss his care with you   She is also asking for ordered of how often to change the bandage     Call back#:  [phone] Jamal Schuster)

## 2023-06-21 NOTE — CONSULTS
Orthopedics   Federica Harrison 69 y.o. male MRN: [id number]  Unit/Bed#: S -01      Chief Complaint:   Recent shoulder surgery, concern for wound drainage. HPI:  69 y.o. male who presents for ABILIO with urinary retention. He has a complex orthopedic history, including reverse total shoulder arthroplasty performed April 2023, s/p I&D 5/2023 for shoulder infection. He was subsequently taken back to the OR with Dr. Pérez Flores 6/13/2023 for explanation and spacer placement. He was discharged from [facility] on IV daptomycin. Currently being administered via PICC line under directive of infectious diseases. Orthopedics has been called to evaluate him this admission for drainage from surgical site with saturated surgical dressings. He is currently non weight bearing to the left upper extremity, but may do range of motion at the elbow and wrist. He notes that he has been compliant with physical activity restrictions. He has no complaints of increased pain, erythema or warmth to the shoulder. Other than saturation of surgical dressings, he has no concerns with the shoulder itself. He has not yet seen Dr. Pérez Flores in follow up. The last time the dressings were changed were on Monday while still admitted to [facility]. Review Of Systems:   · Skin: as per HPI  · Neuro: See HPI  · Musculoskeletal: See HPI  · 14 point review of systems negative except as stated above     Past Medical History:   Past Medical History:   Diagnosis Date   • Arrhythmia    • CPAP (continuous positive airway pressure) dependence    • Diabetes mellitus ([address])    • History of echocardiogram 11/14/2017    showed EF of 50-55 percentWith moderate LVH and left ventricle diastolic dysfunction. Left atrium was moderately enlarged. Trace MR noted.     • History of Holter monitoring 11/21/2017    showed baseline rhythm of sinus origin with an average heart it of 61 bpm. The lowest heart rate was 49 and the highest heart rate was 10 8 bpm. There were rare single VPCs, and frequent PACs representing 3.2% of total beats. There were several episodes of sinus arrhythmias with sinus bradycardia and heart rate ranging from 40-90 bpm. No sustained dysrhythmias, or pauses noted.  The patient did not   • Hypertension    • Liver disease    • Obese    • Sleep apnea        Past Surgical History:   Past Surgical History:   Procedure Laterality Date   • CATARACT EXTRACTION     • EYE SURGERY Left    • HERNIA REPAIR  [id number]   • KNEE ARTHROPLASTY Right    • OH ARTHROPLASTY GLENOHUMERAL JOINT TOTAL SHOULDER Left 2023    Procedure: ARTHROPLASTY SHOULDER REVERSE;  Surgeon: Christopher Contreras MD;  [facility];  Service: Orthopedics   • OH JARED SHOULDER ARTHRPLSTY HUMERAL/GLENOID COMPNT Left 2023    Procedure: ARTHROPLASTY SHOULDER REVISION;  Surgeon: Arik Watson;  Location: [facility];  Service: Orthopedics   • SHOULDER SURGERY Right    • WOUND DEBRIDEMENT Left 2023    Procedure: INCISION AND DRAINAGE (I&D) EXTREMITY, vac placement;  Surgeon: Christopher Contreras MD;  [facility];  Service: Orthopedics       Family History:  Family history reviewed and non-contributory  Family History   Problem Relation Age of Onset   • Diabetes Mother    • Supraventricular tachycardia Mother    • COPD Father    • Heart disease Father    • Other Father         Sepsis; related to UTI with complicating cardiac problems   • Heart disease Paternal Grandmother        Social History:  Social History     Socioeconomic History   • Marital status: /Civil Union     Spouse name: None   • Number of children: 2   • Years of education: 16   • Highest education level: Some college, no degree   Occupational History   • None   Tobacco Use   • Smoking status: Former     Packs/day: 0.50     Years: 20.00     Total pack years: 10.00     Types: Cigarettes     Quit date:      Years since quittin.4   • Smokeless tobacco: Never   Vaping Use   • Vaping Use: Never used Substance and Sexual Activity   • Alcohol use: Not Currently     Comment: quit    • Drug use: Never   • Sexual activity: Not Currently   Other Topics Concern   • None   Social History Narrative    · Most recent tobacco use screenin2018      · Do you currently or have you served in the [address] [place] [address] Street:   No      · Live alone or with others:   with others      · High blood pressure: Yes      · Exercise level:   Occasional      · Overweight:   Yes      · Obese: Yes      · Diabetes:   Yes      Social Determinants of Health     Financial Resource Strain: Not on file   Food Insecurity: No Food Insecurity (2023)    Hunger Vital Sign    • Worried About Running Out of Food in the Last Year: Never true    • Ran Out of Food in the Last Year: Never true   Transportation Needs: No Transportation Needs (2023)    PRAPARE - Transportation    • Lack of Transportation (Medical): No    • Lack of Transportation (Non-Medical): No   Physical Activity: Not on file   Stress: Not on file   Social Connections: Not on file   Intimate Partner Violence: Not on file   Housing Stability: Low Risk  (2023)    Housing Stability Vital Sign    • Unable to Pay for Housing in the Last Year: No    • Number of Places Lived in the Last Year: 1    • Unstable Housing in the Last Year: No       Allergies:    Allergies   Allergen Reactions   • Sulfa Antibiotics Hives           Labs:  0   Lab Value Date/Time    HCT 25.4 (L) 2023 0451    HCT 28.9 (L) 2023 0339    HCT 30.3 (L) 2023 0458    HGB 8.0 (L) 2023 0451    HGB 9.0 (L) 2023 0339    HGB 9.6 (L) 2023 0458    INR 1.08 2023 1703    WBC 3.53 (L) 2023 0451    WBC 3.91 (L) 2023 0339    WBC 3.30 (L) 2023 0458    ESR 85 (H) 2023 1438    CRP 27.0 (H) 2023 1438       Meds:    Current Facility-Administered Medications:   •  DAPTOmycin (CUBICIN) 700 mg in sodium chloride 0.9 % 50 mL IVPB, 6 mg/kg (Order-Specific), Intravenous, Q24H, Yasir Woodson PA-C, Last Rate: 100 mL/hr at 06/21/23 1025, 700 mg at 06/21/23 1025  •  heparin (porcine) subcutaneous injection 5,000 Units, 5,000 Units, Subcutaneous, Q8H [facility], 5,000 Units at 06/21/23 1440 **AND** [CANCELED] Platelet count, , , Once, Yasir Woodson PA-C  •  insulin glargine (LANTUS) subcutaneous injection 20 Units 0.2 mL, 20 Units, Subcutaneous, HS, Neli Steele MD  •  insulin lispro (HumaLOG) 100 units/mL subcutaneous injection 1-5 Units, 1-5 Units, Subcutaneous, HS, Lou Hugo PA-C, 1 Units at 06/20/23 2238  •  insulin lispro (HumaLOG) 100 units/mL subcutaneous injection 2-12 Units, 2-12 Units, Subcutaneous, TID AC, 2 Units at 06/21/23 1230 **AND** Fingerstick Glucose (POCT), , , TID AC, Lou Hugo PA-C  •  oxyCODONE (ROXICODONE) immediate release tablet 10 mg, 10 mg, Oral, Q6H PRN, Yasir Woodson PA-C, 10 mg at 06/20/23 2237  •  sodium chloride 0.9 % infusion, 75 mL/hr, Intravenous, Continuous, Juan Michel MD, Last Rate: 75 mL/hr at 06/21/23 1024, 75 mL/hr at 06/21/23 1024  •  tamsulosin (FLOMAX) capsule 0.4 mg, 0.4 mg, Oral, Daily With Megan Ferrer MD, 0.4 mg at 06/20/23 1639  •  traZODone (DESYREL) tablet 50 mg, 50 mg, Oral, HS PRN, Yasir Woodson PA-C, 50 mg at 06/20/23 2238    Blood Culture:   Lab Results   Component Value Date    BLOODCX No Growth After 5 Days. 09/13/2022    BLOODCX No Growth After 5 Days. 09/13/2022       Wound Culture:   Lab Results   Component Value Date    WOUNDCULT 1+ Growth of Klebsiella oxytoca (A) 09/14/2022    WOUNDCULT 1+ Growth of 09/14/2022       Ins and Outs:  I/O last 24 hours:   In: 1010 [P.O.:960; IV Piggyback:50]  Out: 1060 [Urine:1850]          Physical Exam:   /56   Pulse 90   Temp 98.4 °F (36.9 °C)   Resp 18   SpO2 99%   Gen: No acute distress, resting comfortably in bed  HEENT: Eyes clear, moist mucus membranes, hearing intact  Respiratory: No audible wheezing or stridor  Cardiovascular: Well Perfused peripherally, 2+ distal pulse  Abdomen: nondistended, no peritoneal signs  Musculoskeletal: left upper extremity  · Surgical dressings with moderate strike through. Please see wound images for full characterization. Dressings removed. Sutures in place. There is some oozing noted from the lower margin of the wound, serous. No purulence appreciated. · Wound was recovered with clean mepilex dressings. · No significant surrounding ttp. · ROM not tested at the shoulder. · SILT m/r/u.   · Motor intact ain/pin/m/r/u  · 2+ radial and ulnar pulse  · Musculature is soft and compressible, no pain with passive stretch                _*_*_*_*_*_*_*_*_*_*_*_*_*_*_*_*_*_*_*_*_*_*_*_*_*_*_*_*_*_*_*_*_*_*_*_*_*_*_*_*_*    Assessment:  67 y.o.male s/p recent revision shoulder arthroplasty with explantation and spacer placement on 6/13/23 with Dr. Moon Dowling. Now with some wound drainage. Do not suspect infection. Plan:   · NWB left upper extremity. · Ok for range of motion at the elbow and wrist.   · Ok for dressing changes as needed. · Continue antibiotics per ID, previously Rx'd. · Medical management per primary team.   · No immediate orthopedic intervention indicated. · Rest of care per primary team.   · Case was reviewed and discussed with patients primary orthopedic physician, Dr. Moon Dowling today. · Dispo: Ortho signing off, please call with questions or concerns.      Vidya Preston PA-C

## 2023-06-21 NOTE — ASSESSMENT & PLAN NOTE
Lab Results   Component Value Date    HGBA1C 8.4 (H) 03/20/2023       Recent Labs     06/20/23  1618 06/20/23  2105 06/21/23  0733 06/21/23  1107   POCGLU 189* 165* 82 158*       Blood Sugar Average: Last 72 hrs:  (P) 138.5   • Hold Amaryl while inpatient. • Monitor accu-checks AC and HS. • Continue home insulin regimen with Lantus HS -morning Accu-Chek was low, given the fact patient's creatinine worsened. Decrease the dose of patient's home dose of Lantus and lispro as well. • SSI coverage. • Hypoglycemia protocol.

## 2023-06-21 NOTE — PROGRESS NOTES
[address] NOTE   Tarun Norris 69 y.o. male MRN: [id number]  Unit/Bed#: -Mary Encounter: [id number]  Reason for Consult: ABILIO    ASSESSMENT and PLAN:  1. Acute kidney injury (POA)  · Baseline creatinine around 1.0-1.2. · Creatinine was 0.94 at [facility] on 6/19/23. · Admission creatinine 2.29 on 6/20/23  · Work up: CT imaging did not show any hydronephrosis. Urinalysis is not suggestive of a GN or AIN. He did have urinary retention the previous admission. · Initial etiology was felt to be urinary retention vs possible ATN. However, renal function is worse and creatinine is up to 4.07 today despite cadena. This goes against urinary retention. · He is now clinically behaving like ATN. Although UA does not suggest it, AIN remains a possible diagnosis. · Fortunately, he is nonoliguric. · We will repeat urinalysis and check CK level. · Continue IVF. 2. Urinary retention:  · Cadena catheter inserted June 20. · Now on tamsulosin 0.4 mg daily.     3. Left shoulder prosthetic joint infection  · Currently on daptomycin. · Check CK.      4. Diabetes. · Per SLIM      5. Hypertension:  · BP is controlled. · Current meds: none. DISPOSITION:  · Continue NS at 75 cc/hr. · Repeat UA and check CK level. · No urgent need for HD. SUBJECTIVE / 24H INTERVAL HISTORY:  No acute events overnight. Denies CP or SOB. He reports fair appetite. UO is 1200 cc yesterday.      OBJECTIVE:  Current Weight:    Vitals:    06/20/23 1100 06/20/23 1500 06/20/23 2209 06/21/23 0700   BP: 121/56 125/63 123/60 123/60   BP Location: Right arm Right arm  Right arm   Pulse: 77 79 81 95   Resp: 20 19 19 18   Temp: 98.1 °F (36.7 °C) 98 °F (36.7 °C) 98.4 °F (36.9 °C) 98.8 °F (37.1 °C)   TempSrc: Oral Oral Oral Oral   SpO2: 99% 98% 98% 96%       Intake/Output Summary (Last 24 hours) at 6/21/2023 1259  Last data filed at 6/21/2023 1211  Gross per 24 hour   Intake 960 ml   Output 1850 ml   Net -890 ml     General: conscious, cooperative, no distress  Skin: dry  Eyes: pink conjunctivae  ENT: moist mucous membranes  Respiratory: equal chest expansion, clear breath sounds. Cardiovascular: distinct heart sounds, normal rate, regular rhythm, no rub  Abdomen: soft, non tender, non distended, normal bowel sounds  Extremities: mild LE edema. Genitourinary: + cadena catheter. Clear urine noted in bag. Neuro: awake, alert.    Psych: appropriate affect    Medications:    Current Facility-Administered Medications:   •  DAPTOmycin (CUBICIN) 700 mg in sodium chloride 0.9 % 50 mL IVPB, 6 mg/kg (Order-Specific), Intravenous, Q24H, Lou Dolan PA-C, Last Rate: 100 mL/hr at 06/21/23 1025, 700 mg at 06/21/23 1025  •  heparin (porcine) subcutaneous injection 5,000 Units, 5,000 Units, Subcutaneous, Q8H [address], 5,000 Units at 06/21/23 0553 **AND** [CANCELED] Platelet count, , , Once, Cadence Torres PA-C  •  insulin glargine (LANTUS) subcutaneous injection 20 Units 0.2 mL, 20 Units, Subcutaneous, HS, Neli Gallegos MD  •  insulin lispro (HumaLOG) 100 units/mL subcutaneous injection 1-5 Units, 1-5 Units, Subcutaneous, HS, Lou Dolan PA-C, 1 Units at 06/20/23 2238  •  insulin lispro (HumaLOG) 100 units/mL subcutaneous injection 2-12 Units, 2-12 Units, Subcutaneous, TID AC, 2 Units at 06/21/23 1230 **AND** Fingerstick Glucose (POCT), , , TID AC, Lou Dolan PA-C  •  oxyCODONE (ROXICODONE) immediate release tablet 10 mg, 10 mg, Oral, Q6H PRN, Cadence Torres PA-C, 10 mg at 06/20/23 2237  •  sodium chloride 0.9 % infusion, 75 mL/hr, Intravenous, Continuous, Marielle Jimenez MD, Last Rate: 75 mL/hr at 06/21/23 1024, 75 mL/hr at 06/21/23 1024  •  tamsulosin (FLOMAX) capsule 0.4 mg, 0.4 mg, Oral, Daily With Vandana Field MD, 0.4 mg at 06/20/23 1639  •  traZODone (DESYREL) tablet 50 mg, 50 mg, Oral, HS PRN, Cadence Torres PA-C, 50 mg at 06/20/23 0502    Laboratory Results:  Results from last 7 days   Lab Units 06/21/23  0451 06/20/23  1811 06/20/23  0339 06/19/23  0458 06/18/23  0517 06/17/23  0553 06/16/23  0544 06/15/23  0525   WBC Thousand/uL 3.53*  --  3.91* 3.30* 2.77* 3.43* 4.46 5.82   HEMOGLOBIN g/dL 8.0*  --  9.0* 9.6* 9.3* 9.8* 9.6* 10.3*   HEMATOCRIT % 25.4*  --  28.9* 30.3* 29.3* 31.2* 30.9* 31.9*   PLATELETS Thousands/uL 86*  --  91* 91* 82* 96* 85* 103*   POTASSIUM mmol/L 4.4 4.8 4.3 4.4 4.2 4.3 3.9 3.9   CHLORIDE mmol/L 106 103 104 107 107 105 104 105   CO2 mmol/L 23 23 25 24 23 25 23 24   BUN mg/dL 38* 35* 28* 16 20 25 30* 33*   CREATININE mg/dL 4.07* 3.35* 2.29* 0.94 0.96 1.14 1.23 1.49*   CALCIUM mg/dL 8.1* 8.2* 8.7 8.9 8.7 8.9 9.0 8.9

## 2023-06-21 NOTE — ASSESSMENT & PLAN NOTE
· Initial suspicion for ABILIO was suspected secondary to urinary retention however in spite of getting a Tejeda catheter and no hydronephrosis or bladder urine retention-patient's creatinine is still getting worse. Today creatinine is 4.09. Baseline creatinine is around 1.1-0.9. Most likely etiology for ABILIO seems to be ATN/interstitial nephritis from unclear etiology. Follow-up nephrology recommendations. Continue IV fluid. · No plan for HD for now. Patient is symptom wise feeling better. · Continue to monitor the patient on Tejeda catheter and monitor I's and O's. · Plan to check CK levels. Last CK levels were stable at 107. · Hold Dyazide and losartan. · Follow-up nephrology recommendations.

## 2023-06-21 NOTE — ASSESSMENT & PLAN NOTE
· Patient is s/p reverse total shoulder arthoplasty on 4/423 and s/p I&D of left shoulder on 5/2/23 with recent admission at [facility] for left shoulder surgical site infection with abscess and possible prosthetic infection and is now s/p explantation and spacer placement on 6/13/23 with cultures growing cutibacterium avidum and staphylococcus epidermidis. · Discharged from [facility] on 6/19/23 on IV daptomycin 700 mg q24h for 6 weeks (until 7/28/23). Continue IV daptomycin. · Some discharge from the left shoulder noted. Consulted orthopedic to evaluate for any need for intervention or wound care recommendations.

## 2023-06-22 ENCOUNTER — HOME HEALTH ADMISSION (OUTPATIENT)
Dept: HOME HEALTH SERVICES | Facility: HOME HEALTHCARE | Age: 68
End: 2023-06-22
Payer: MEDICARE

## 2023-06-22 LAB
ANION GAP SERPL CALCULATED.3IONS-SCNC: 11 MMOL/L
ARTERIAL PATENCY WRIST A: NO
BASE EX.OXY STD BLDV CALC-SCNC: 83.7 % (ref 60–80)
BASE EXCESS BLDV CALC-SCNC: -4.1 MMOL/L
BUN SERPL-MCNC: 50 MG/DL (ref 5–25)
CALCIUM SERPL-MCNC: 8 MG/DL (ref 8.4–10.2)
CHLORIDE SERPL-SCNC: 108 MMOL/L (ref 96–108)
CO2 SERPL-SCNC: 15 MMOL/L (ref 21–32)
CREAT SERPL-MCNC: 4.78 MG/DL (ref 0.6–1.3)
GFR SERPL CREATININE-BSD FRML MDRD: 11 ML/MIN/1.73SQ M
GLUCOSE SERPL-MCNC: 146 MG/DL (ref 65–140)
GLUCOSE SERPL-MCNC: 194 MG/DL (ref 65–140)
GLUCOSE SERPL-MCNC: 198 MG/DL (ref 65–140)
GLUCOSE SERPL-MCNC: 61 MG/DL (ref 65–140)
GLUCOSE SERPL-MCNC: 94 MG/DL (ref 65–140)
HCO3 BLDV-SCNC: 20.8 MMOL/L (ref 24–30)
O2 CT BLDV-SCNC: 11.1 ML/DL
PCO2 BLDV: 37.1 MM HG (ref 42–50)
PH BLDV: 7.37 [PH] (ref 7.3–7.4)
PO2 BLDV: 54.2 MM HG (ref 35–45)
POTASSIUM SERPL-SCNC: 5.3 MMOL/L (ref 3.5–5.3)
SODIUM SERPL-SCNC: 134 MMOL/L (ref 135–147)

## 2023-06-22 PROCEDURE — 82805 BLOOD GASES W/O2 SATURATION: CPT | Performed by: INTERNAL MEDICINE

## 2023-06-22 PROCEDURE — 99233 SBSQ HOSP IP/OBS HIGH 50: CPT | Performed by: INTERNAL MEDICINE

## 2023-06-22 PROCEDURE — 82948 REAGENT STRIP/BLOOD GLUCOSE: CPT

## 2023-06-22 PROCEDURE — 99223 1ST HOSP IP/OBS HIGH 75: CPT | Performed by: INTERNAL MEDICINE

## 2023-06-22 PROCEDURE — 80048 BASIC METABOLIC PNL TOTAL CA: CPT | Performed by: INTERNAL MEDICINE

## 2023-06-22 RX ADMIN — INSULIN GLARGINE 20 UNITS: 100 INJECTION, SOLUTION SUBCUTANEOUS at 21:59

## 2023-06-22 RX ADMIN — OXYCODONE HYDROCHLORIDE 10 MG: 10 TABLET ORAL at 22:30

## 2023-06-22 RX ADMIN — TRAZODONE HYDROCHLORIDE 50 MG: 50 TABLET ORAL at 22:30

## 2023-06-22 RX ADMIN — SODIUM BICARBONATE 75 ML/HR: 84 INJECTION, SOLUTION INTRAVENOUS at 15:18

## 2023-06-22 RX ADMIN — INSULIN LISPRO 1 UNITS: 100 INJECTION, SOLUTION INTRAVENOUS; SUBCUTANEOUS at 22:00

## 2023-06-22 RX ADMIN — TAMSULOSIN HYDROCHLORIDE 0.4 MG: 0.4 CAPSULE ORAL at 17:36

## 2023-06-22 RX ADMIN — VANCOMYCIN HYDROCHLORIDE 2000 MG: 10 INJECTION, POWDER, LYOPHILIZED, FOR SOLUTION INTRAVENOUS at 16:31

## 2023-06-22 RX ADMIN — INSULIN LISPRO 2 UNITS: 100 INJECTION, SOLUTION INTRAVENOUS; SUBCUTANEOUS at 16:39

## 2023-06-22 RX ADMIN — SODIUM CHLORIDE 75 ML/HR: 0.9 INJECTION, SOLUTION INTRAVENOUS at 03:06

## 2023-06-22 RX ADMIN — HEPARIN SODIUM 5000 UNITS: 5000 INJECTION INTRAVENOUS; SUBCUTANEOUS at 15:20

## 2023-06-22 RX ADMIN — HEPARIN SODIUM 5000 UNITS: 5000 INJECTION INTRAVENOUS; SUBCUTANEOUS at 06:19

## 2023-06-22 RX ADMIN — HEPARIN SODIUM 5000 UNITS: 5000 INJECTION INTRAVENOUS; SUBCUTANEOUS at 21:59

## 2023-06-22 NOTE — CONSULTS
Consultation - Infectious Disease   Emi Cuevas 69 y.o. male MRN: [id number]  Unit/Bed#: S -01 Encounter: [id number]    IMPRESSION & RECOMMENDATIONS:   1. 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 Patient initial arthroplasty on 4/4/2023. Post operatively he developed purulent weeping from the site and shoulder imaging revealed a fluid collection communicating to the joint space. Cultures from I&D 5/2/2023 grew coagulase-negative staph and alphahemolytic strep in broth only, as well as growth of cutibacterium acnes and avidum. He then underwent L shoulder resection arthroplasty with insertion of antibiotic spacer on 6/13/2023. OR cultures grew Cutibacterium avidum, MRSE, and micrococcus luteus. PICC was placed and patient was placed on Daptomycin to complete a 6-week course of IV antibiotics after antibiotic spacer, through 7/28/2023. Patient now back at hospital with ABILIO with concern for ATN and renal failure. Consider role of antibiotic in renal function change. After review of his polymicrobial cultures I recommend transitioning patient off the Daptomycin and on to IV vancomycin. Will consult pharmacy for guidance on vancomycin dosage.  -stop IV Daptomycin  -start IV vancomycin  -consult pharmacy for guidance on vancomycin dosage  -check CBCD and BMP tomorrow  -monitor vitals  -serial L shoulder exams  -surgical site care per orthopedic surgery  -continue follow up with orthopedic surgery    2. ABILIO. Upon review of patient's available medical records it appears his baseline creatinine is approximately 1-1.2 Creatinine was 2.29 upon admission. Has increased further today to 4.78. Tejeda now in place for accurate I&O and in setting of intermittent retention. He has consistently made urine today despite worsening creatinine. Nephrology is follow closely. There is concern for ATN. Consider role of antibiotic in renal function change. Kidney biopsy is being considered. -antibiotic now changed as above  -volume management per primary service/nephrology   -check creatinine tomorrow  -serial exams and care of cadena  -monitor urine output  -continue close follow up with nephrology    3. Chronic leukopenia and thrombocytopenia. Likely due to patient's cirrhosis. CBCD currently stable.  -resume outpatient GI and hematology follow up with [facility] after discharge    4. Type 2 diabetes mellitus with long term insulin use. Patient's last HbA1c was 8.4% on 3/20/2023. Elevated blood glucose is risk factor for infection and complications with healing. Recommend tight glycemic control  -blood glucose management per primary service     5. Obesity. BMI = 36.59.   -dose adjust antibiotics for patient weight as needed     6. Antibiotic allergy. Patient reports hives with sulfa antibiotics. We will avoid this drug class for now.  -monitor patient for adverse medication reactions    We will continue to follow along with the patient. Above plan was discussed in detail with patient and his family at the bedside. Above plan was discussed in detail with primary service. Above plan was discussed in detail with nephrology. I have spent 80 minutes with patient/family, other providers, and in review of records today in completing this consultation. Total time spent included review of testing, ordering medications and tests, communicating with other providers, documentation time, and counseling/providing education to patient/family as detailed in my note. HISTORY OF PRESENT ILLNESS:  Reason for Consult: postoperative cellulitis of surgical wound, post-traumatic osteoarthritis of left shoulder   HPI: Chhaya Liang is a 69y.o. year old male who presented to the [address] [place] [address] on 6/20/2023 with urinary retention.  Patient reported he was discharged from the hospital on the prior day (was inpatient for treatment of L shoulder prosthetic joint infection, growth of MRSE, micrococcus luteus, cutibacterium acnes and avidum ) and hasn't been able to urinate since he got home. Patient also noted his legs were very swollen. Of note: patient presented with intact RUE PICC and has been on Daptomycin at home with goal to complete a 6-week course of IV antibiotic. Upon arrival to the ED the patient's temperature was 98.5. HR was 85. RR was 18. O2 saturation was 98% on room air. BP was 128/58. WBC count was 3.91. Creatinine was 2.29 with GFR = 28. AST and alk phos slightly elevated. UA was benign. Patient completed a CXR which showed no acute infectious cardiopulmonary findings. He completed a venous duplex study which was negative for acute or chronic DVT. He completed CT A/P which showed no acute infectious findings. The patient was admitted for additional medical management. After his admission he was continued on Daptomycin for ongoing treatment of his recent L shoulder PJI. Orthopedic surgery assessed the shoulder and recommend ongoing dressing changes. A cadena catheter was placed. He was started on Flomax. Nephrology was consulted. While he continued to have urine output in the cadena his creatinine continued to increase. There is concern for ATN. Antibiotic possibly playing a role in renal issue. We have been asked for formal consult for postoperative cellulitis of surgical wound, post-traumatic osteoarthritis of left shoulder. The patient has HTN, liver disease, arrhythmia, DMII, obesity, sleep apnea with use of CPAP, alcoholic cirrhosis, OA, and insomnia. He has a history of urinary retention, heart murmer, L shoulder trauma, cholelithiasis, alcohol abuse, thrombocytopenia, cataract extraction, L shoulder arthroplasty and revision, wound debridement, hernia repair, and R knee arthroplasty. He is a former smoker. He has an allergy to sulfa antibiotics. REVIEW OF SYSTEMS:  Patient reports his shoulder incision seems to be oozing a lot.  He notes heavy draining collecting in the dressing and is hoping it can be changed soon. Also feels his legs remain swollen, daughter shows an area that is also weeping on his L shin. Patient has no concerns with his cadena, notes he's had a lot of urine emptied throughout the day. No pain or pressure over the pelvis/bladder area. No nausea, vomiting, abdominal pain, diarrhea. He has no fever, chills, sweats, shakes. No cough, difficulty breathing, chest pain, or SOB. No concerns with his RUE PICC. A complete 12 point system-based review of systems is otherwise negative. PAST MEDICAL HISTORY:  Past Medical History:   Diagnosis Date   • Arrhythmia    • CPAP (continuous positive airway pressure) dependence    • Diabetes mellitus ([address])    • History of echocardiogram 11/14/2017    showed EF of 50-55 percentWith moderate LVH and left ventricle diastolic dysfunction. Left atrium was moderately enlarged. Trace MR noted. • History of Holter monitoring 11/21/2017    showed baseline rhythm of sinus origin with an average heart it of 61 bpm. The lowest heart rate was 49 and the highest heart rate was 10 8 bpm. There were rare single VPCs, and frequent PACs representing 3.2% of total beats. There were several episodes of sinus arrhythmias with sinus bradycardia and heart rate ranging from 40-90 bpm. No sustained dysrhythmias, or pauses noted.  The patient did not   • Hypertension    • Liver disease    • Obese    • Sleep apnea      Past Surgical History:   Procedure Laterality Date   • CATARACT EXTRACTION     • EYE SURGERY Left 1997   • HERNIA REPAIR  [id number]   • KNEE ARTHROPLASTY Right 2008   • GA ARTHROPLASTY GLENOHUMERAL JOINT TOTAL SHOULDER Left 4/4/2023    Procedure: ARTHROPLASTY SHOULDER REVERSE;  Surgeon: Bryan Moody MD;  Location: [facility];  Service: Orthopedics   • GA JARED SHOULDER ARTHRPLSTY HUMERAL/GLENOID COMPNT Left 6/13/2023    Procedure: ARTHROPLASTY SHOULDER REVISION;  Surgeon: Ifeanyi Hutchinson;  Location: [facility];  Service: Orthopedics   • SHOULDER SURGERY Right    • WOUND DEBRIDEMENT Left 2023    Procedure: INCISION AND DRAINAGE (I&D) EXTREMITY, vac placement;  Surgeon: Dario Giles MD;  Location: [facility];  Service: Orthopedics     FAMILY HISTORY:  Non-contributory    SOCIAL HISTORY:  Social History   Social History     Substance and Sexual Activity   Alcohol Use Not Currently    Comment: quit      Social History     Substance and Sexual Activity   Drug Use Never     Social History     Tobacco Use   Smoking Status Former   • Packs/day: 0.50   • Years: 20.00   • Total pack years: 10.00   • Types: Cigarettes   • Quit date:    • Years since quittin.4   Smokeless Tobacco Never     ALLERGIES:  Allergies   Allergen Reactions   • Sulfa Antibiotics Hives     MEDICATIONS:  All current active medications have been reviewed. ANTIBIOTICS:  Daptomycin - stop  Vancomycin 1  Antibiotics 11    PHYSICAL EXAM:  Temp:  [98.3 °F (36.8 °C)-98.5 °F (36.9 °C)] 98.5 °F (36.9 °C)  HR:  [90-99] 99  Resp:  [16-18] 16  BP: (116-120)/(56-83) 116/83  SpO2:  [98 %-99 %] 98 %  Temp (24hrs), Av.4 °F (36.9 °C), Min:98.3 °F (36.8 °C), Max:98.5 °F (36.9 °C)  Current: Temperature: 98.5 °F (36.9 °C)    Intake/Output Summary (Last 24 hours) at 2023 1305  Last data filed at 2023 1248  Gross per 24 hour   Intake 1000 ml   Output 4180 ml   Net -3180 ml     General Appearance:  Appearing well, nontoxic, and in no distress. He appears comfortable sitting out of bed in his chair. Head:  Normocephalic, without obvious abnormality, atraumatic. Eyes:  Conjunctiva pink and sclera anicteric, both eyes. He is wearing his glasses. Nose: Nares normal, mucosa normal, no drainage. Throat: Oropharynx moist without lesions. Lungs:   Clear to auscultation bilaterally, respirations unlabored on room air. CPAP beside his bed. Chest Wall:  No tenderness or deformity. Heart:  RRR; no murmur, rub or gallop.    Abdomen:   Soft, obese, non-tender, non-distended, positive bowel sounds throughout. Extremities: RUE PICC intact, overlying dressing is clean, dry. No cyanosis or clubbing. B/L LE edema without overlying erythema, some weeping of serous fluid noted on the L shin. Hemosiderin staining of the B/L LE. L shoulder incision site with intact silver mepilex, heavily saturated on distal aspect but not leaking onto skin. Skin: No rashes noted on exposed skin. Lymph nodes: Cervical, supraclavicular nodes normal.   Neurologic: Alert and oriented times 3, follows commands, speech is organized and appropriate, symmetric facial movement. LABS, IMAGING, & OTHER STUDIES:  Lab Results:  I have personally reviewed pertinent labs. Results from last 7 days   Lab Units 06/21/23  0451 06/20/23  0339 06/19/23  0458   WBC Thousand/uL 3.53* 3.91* 3.30*   HEMOGLOBIN g/dL 8.0* 9.0* 9.6*   PLATELETS Thousands/uL 86* 91* 91*     Results from last 7 days   Lab Units 06/22/23  0459 06/20/23  1811 06/20/23  0339   POTASSIUM mmol/L 5.3   < > 4.3   CHLORIDE mmol/L 108   < > 104   CO2 mmol/L 15*   < > 25   BUN mg/dL 50*   < > 28*   CREATININE mg/dL 4.78*   < > 2.29*   EGFR ml/min/1.73sq m 11   < > 28   CALCIUM mg/dL 8.0*   < > 8.7   AST U/L  --   --  40*   ALT U/L  --   --  27   ALK PHOS U/L  --   --  161*    < > = values in this interval not displayed. Imaging Studies:   I have personally reviewed pertinent imaging study reports and images in PACS. XR CHEST 1 VIEW PORTABLE 6/20/2023 - I personally reviewed this image which showed no acute infectious cardiopulmonary findings. CT ABDOMEN PELVIS WO CONTRAST 6/20/2023 - I personally reviewed this study which showed cirrhosis of the liver. There are gallstones within the gallbladder. No pericholecystic fluid or inflammatory changes. Patient has splenomegaly. Kidneys without obstruction or hydronephosis. There is mild B/L perinephric stranding. Stomach and bowel unobstructed. Patient has diverticulosis without diverticulitis.      Other Studies:   I have personally reviewed pertinent reports:  L shoulder tissue cultures from 6/13/2023. L shoulder abscess fluid culture from 6/11/2023.

## 2023-06-22 NOTE — PROGRESS NOTES
-- Patient: Ivy Bailey  -- MRN: [id number]  -- Aidin Request ID: [id number]  -- Level of care reserved:  Infusion  -- Partner [facility], [address] [place] [address] [phone]  -- Clinical needs requested:  -- Geography searched: [phone]  -- Start of Service:  -- Request sent: 2:22pm EDT on 6/22/2023 by Sadaf Marc  -- Partner reserved: 2:44pm EDT on 6/22/2023 by Sadaf Marc  -- Choice list shared:

## 2023-06-22 NOTE — PROGRESS NOTES
-- Patient: Malorie Woodard  -- MRN: [id number]  -- Aidin Request ID: [id number]  -- Level of care reserved: Cayla Ernst  -- Partner Reserved: [facility], JACY, [address] [place] [address] [phone]  -- Clinical needs requested:  -- Geography searched: [phone]  -- Start of Service:  -- Request sent: 10:40am EDT on 6/22/2023 by Demetrice Fall  -- Partner reserved: 11:05am EDT on 6/22/2023 by Demetrice Fall  -- Choice list shared: 11:04am EDT on 6/22/2023 by Demetrice Fall

## 2023-06-22 NOTE — PROGRESS NOTES
Michelle Stewart is a 69 y.o. male who is currently ordered Vancomycin IV with management by the Pharmacy Consult service. Relevant clinical data and objective / subjective history reviewed. Vancomycin Assessment:  Indication and Goal AUC/Trough: Bone/joint infection (goal -600, trough >10), -600, trough >10  Clinical Status:  new  Micro:   pending  Renal Function:  SCr: 4.78 mg/dL  CrCl: 18.9 mL/min  Renal replacement: Not on dialysis  Days of Therapy: 1  Current Dose: 1750mg IV q24 hours  Vancomycin Plan:  New Dosing IV loading dose followed by 1500mg IV daily prn level <15    Next Level: 23 @ 0600  Renal Function Monitoring: Daily BMP and [place] will continue to follow closely for s/sx of nephrotoxicity, infusion reactions and appropriateness of therapy. BMP and CBC will be ordered per protocol. We will continue to follow the patient’s culture results and clinical progress daily.     Ayden Carlin, Pharmacist

## 2023-06-22 NOTE — PROGRESS NOTES
Progress Note - Nephrology   Radha Kunz 69 y.o. male MRN: [id number]  Unit/Bed#: S -01 Encounter: [id number]      61-year-old male with past medical history of hypertension, type 2 diabetes, alcoholic liver cirrhosis, recently discharged from [address] [place] [address] on 6/19 after he have had left shoulder prostatic infection now status post left shoulder resection and revision/arthroplasty and insertion of antibiotic spacer 6/13. Patient was discharged on daptomycin. Creatinine on discharge on 6/19 was 0.9. Patient presented to [facility] the night after he was discharged because of decreased urine output. Creatinine was noted to be 2.29. Patient was suspected to have ABILIO secondary to acute urinary retention, Tejeda was placed on presentation however his creatinine increased since. CT abdomen pelvis on presentation showed no hydronephrosis or calculi, mild bilateral perinephric stranding that can be seen in the setting of renal insufficiency, enlarged prostate, urinary bladder decompressed by Tejeda catheter. Also UA was unremarkable. Assessment:  1. Acute on chronic kidney injury  • Baseline creatinine is 0.9-1.2. • Creatinine on admission 2.29, increased to 4.78 this am. BUN 50  • Patient was started on normal saline 75 mL/h on 6/20 p.m. after his creatinine increased. • Intake 1 L / 24 hours, output 3.5 L/-2.4 L in 24 h. • Seems like the patient has ATN now, unclear why he developed it. CK normal, pt being on Daptomycin. 2. Acid base disorder - metabolic acidosis   •  CO2: 15, from 23 yesterday.      3. Hypertension  • Home medication Cardizem 180 mg daily, losartan 100 mg a day, triamterene/hydrochlorothiazide (Dyazide)37.5 - 25 mg daily  • His BP meds are on hold currently. • His BP on the lower side      4. Anemia   • Hb 8.0, lower than patient's baseline, and trending down. Might be dilutional   • Iron studies in march consistent with anemia of chronic disease   • Monitor      5.  Type 2 diabetes mellitus   • Last Hb A1c: 8.4   • Hi BS on the lower side      6. Obesity and MEG on CPAP      7. Thrombocytopenia   • Chronic, likely 2/2 cirrhosis      8. Alcoholic cirrhosis   • CT showed cirrhotic liver with portal hypertension and splenomegaly. • F/u with Gi as outpatient      9. Postop infection of surgical wound  • Status post reverse total left shoulder arthroplasty on 4/23 and s/p I&D on left shoulder on 5/2/2023 for side wound infection. Patient was recently admitted at [facility] for left shoulder surgical site infection with abscess and possible prostatic infection, culture grew cutibacterium avidum. Status post left shoulder resection and revision/arthroplasty and insertion of antibiotic spacer 6/13. Patient was discharged on IV daptomycin for a 6-week course     10. Lower extremity edema   • Worsen, per patient. Changes similar to venous insufficiency   • Echo 6/23: EF 60%, DD1, mild Ao stenosis     Plan:  · Continue iv fluids and monitor kidney function. If ATN it might platou soon. · Start bicarbonate tablets 650 mg bid   · Continue to monitor I/Os  · CMP am   · Avoid hypotension and nephrotoxins     Will discuss with my attending and will let primary team know about final plan     Subjective:   patient resting in the recliner in no distress. Was feeling well ,but didn't sleep much last night. Had no complaints     Objective:     Vitals: Blood pressure 116/83, pulse 99, temperature 98.5 °F (36.9 °C), resp. rate 16, weight 119 kg (262 lb 5.6 oz), SpO2 98 %. ,Body mass index is 36.59 kg/m². Weight (last 2 days)     Date/Time Weight    06/22/23 0600 119 (262.35)            Intake/Output Summary (Last 24 hours) at 6/22/2023 1000  Last data filed at 6/22/2023 0601  Gross per 24 hour   Intake 1050 ml   Output 3530 ml   Net -2480 ml       Urethral Catheter Non-latex 16 Fr.  (Active)   Goal for Removal Remove after 48 hrs of I/O monitoring 06/21/23 3515   Site Assessment Clean;Skin intact 06/21/23 [facility] 06/22/23 0900   Collection Container Standard drainage bag 06/21/23 1211   Output (mL) 1550 mL 06/22/23 0601     Physical Exam  Vitals reviewed. Constitutional:       General: He is not in acute distress. Appearance: He is obese. He is not diaphoretic. Eyes:      General: No scleral icterus. Right eye: No discharge. Left eye: No discharge. Cardiovascular:      Rate and Rhythm: Normal rate and regular rhythm. Heart sounds: Murmur heard. Pulmonary:      Effort: No respiratory distress. Breath sounds: Normal breath sounds. No wheezing or rales. Abdominal:      General: There is no distension. Palpations: Abdomen is soft. Tenderness: There is no abdominal tenderness. There is no guarding or rebound. Genitourinary:     Comments: Tejeda with clear urine   Musculoskeletal:      Right lower leg: Edema present. Left lower leg: Edema present. Skin:     General: Skin is warm. Neurological:      Mental Status: He is alert and oriented to person, place, and time. Psychiatric:         Mood and Affect: Mood normal.         Behavior: Behavior normal.         Thought Content:  Thought content normal.         Judgment: Judgment normal.         Lab, Imaging and other studies:   CBC: No results found for: "WBC", "HGB", "HCT", "MCV", "PLT", "ADJUSTEDWBC", "RBC", "MCH", "MCHC", "RDW", "MPV", "NRBC"  CMP:   Lab Results   Component Value Date    SODIUM 134 (L) 06/22/2023    K 5.3 06/22/2023     06/22/2023    CO2 15 (L) 06/22/2023    BUN 50 (H) 06/22/2023    CREATININE 4.78 (H) 06/22/2023    CALCIUM 8.0 (L) 06/22/2023    EGFR 11 06/22/2023     Phosphorus: No results found for: "PHOS"  Magnesium: No results found for: "MG"  ABG: No results found for: "PHART", "EGK9QVH", "PO2ART", "WTY2TFP", "X6LFFCOX", "BEART", "SOURCE"

## 2023-06-22 NOTE — PLAN OF CARE
Problem: PAIN - ADULT  Goal: Verbalizes/displays adequate comfort level or baseline comfort level  Description: Interventions:  - Encourage patient to monitor pain and request assistance  - Assess pain using appropriate pain scale  - Administer analgesics based on type and severity of pain and evaluate response  - Implement non-pharmacological measures as appropriate and evaluate response  - Consider cultural and social influences on pain and pain management  - Notify physician/advanced practitioner if interventions unsuccessful or patient reports new pain  Outcome: Progressing     Problem: INFECTION - ADULT  Goal: Absence or prevention of progression during hospitalization  Description: INTERVENTIONS:  - Assess and monitor for signs and symptoms of infection  - Monitor lab/diagnostic results  - Monitor all insertion sites, i.e. indwelling lines, tubes, and drains  - Monitor endotracheal if appropriate and nasal secretions for changes in amount and color  - [place] appropriate cooling/warming therapies per order  - Administer medications as ordered  - Instruct and encourage patient and family to use good hand hygiene technique  - Identify and instruct in appropriate isolation precautions for identified infection/condition  Outcome: Progressing     Problem: GENITOURINARY - ADULT  Goal: Maintains or returns to baseline urinary function  Description: INTERVENTIONS:  - Assess urinary function  - Encourage oral fluids to ensure adequate hydration if ordered  - Administer IV fluids as ordered to ensure adequate hydration  - Administer ordered medications as needed  - Offer frequent toileting  - Follow urinary retention protocol if ordered  Outcome: Progressing     Problem: GENITOURINARY - ADULT  Goal: Absence of urinary retention  Description: INTERVENTIONS:  - Assess patient’s ability to void and empty bladder  - Monitor I/O  - Bladder scan as needed  - Discuss with physician/AP medications to alleviate retention as needed  - Discuss catheterization for long term situations as appropriate  Outcome: Progressing

## 2023-06-23 LAB
ALBUMIN SERPL BCP-MCNC: 2.8 G/DL (ref 3.5–5)
ALP SERPL-CCNC: 167 U/L (ref 34–104)
ALT SERPL W P-5'-P-CCNC: 24 U/L (ref 7–52)
ANION GAP SERPL CALCULATED.3IONS-SCNC: 7 MMOL/L
ANION GAP SERPL CALCULATED.3IONS-SCNC: 8 MMOL/L
AST SERPL W P-5'-P-CCNC: 61 U/L (ref 13–39)
BILIRUB SERPL-MCNC: 0.89 MG/DL (ref 0.2–1)
BUN SERPL-MCNC: 51 MG/DL (ref 5–25)
BUN SERPL-MCNC: 52 MG/DL (ref 5–25)
CALCIUM ALBUM COR SERPL-MCNC: 9.2 MG/DL (ref 8.3–10.1)
CALCIUM SERPL-MCNC: 8.2 MG/DL (ref 8.4–10.2)
CALCIUM SERPL-MCNC: 8.6 MG/DL (ref 8.4–10.2)
CHLORIDE SERPL-SCNC: 104 MMOL/L (ref 96–108)
CHLORIDE SERPL-SCNC: 109 MMOL/L (ref 96–108)
CO2 SERPL-SCNC: 19 MMOL/L (ref 21–32)
CO2 SERPL-SCNC: 27 MMOL/L (ref 21–32)
CREAT SERPL-MCNC: 4.09 MG/DL (ref 0.6–1.3)
CREAT SERPL-MCNC: 4.66 MG/DL (ref 0.6–1.3)
GFR SERPL CREATININE-BSD FRML MDRD: 11 ML/MIN/1.73SQ M
GFR SERPL CREATININE-BSD FRML MDRD: 13 ML/MIN/1.73SQ M
GLUCOSE SERPL-MCNC: 126 MG/DL (ref 65–140)
GLUCOSE SERPL-MCNC: 146 MG/DL (ref 65–140)
GLUCOSE SERPL-MCNC: 200 MG/DL (ref 65–140)
GLUCOSE SERPL-MCNC: 214 MG/DL (ref 65–140)
GLUCOSE SERPL-MCNC: 253 MG/DL (ref 65–140)
GLUCOSE SERPL-MCNC: 269 MG/DL (ref 65–140)
PHOSPHATE SERPL-MCNC: 4.8 MG/DL (ref 2.3–4.1)
POTASSIUM SERPL-SCNC: 4.7 MMOL/L (ref 3.5–5.3)
POTASSIUM SERPL-SCNC: 5.9 MMOL/L (ref 3.5–5.3)
PROT SERPL-MCNC: 5.9 G/DL (ref 6.4–8.4)
SODIUM SERPL-SCNC: 136 MMOL/L (ref 135–147)
SODIUM SERPL-SCNC: 138 MMOL/L (ref 135–147)
VANCOMYCIN SERPL-MCNC: 12.3 UG/ML (ref 10–20)

## 2023-06-23 PROCEDURE — 80053 COMPREHEN METABOLIC PANEL: CPT | Performed by: INTERNAL MEDICINE

## 2023-06-23 PROCEDURE — 99232 SBSQ HOSP IP/OBS MODERATE 35: CPT | Performed by: INTERNAL MEDICINE

## 2023-06-23 PROCEDURE — 99233 SBSQ HOSP IP/OBS HIGH 50: CPT | Performed by: INTERNAL MEDICINE

## 2023-06-23 PROCEDURE — 80048 BASIC METABOLIC PNL TOTAL CA: CPT | Performed by: INTERNAL MEDICINE

## 2023-06-23 PROCEDURE — 80202 ASSAY OF VANCOMYCIN: CPT | Performed by: INTERNAL MEDICINE

## 2023-06-23 PROCEDURE — 84100 ASSAY OF PHOSPHORUS: CPT | Performed by: INTERNAL MEDICINE

## 2023-06-23 PROCEDURE — 82948 REAGENT STRIP/BLOOD GLUCOSE: CPT

## 2023-06-23 RX ORDER — SODIUM BICARBONATE 650 MG/1
1300 TABLET ORAL 3 TIMES DAILY
Status: DISCONTINUED | OUTPATIENT
Start: 2023-06-23 | End: 2023-06-24

## 2023-06-23 RX ORDER — DEXTROSE MONOHYDRATE 25 G/50ML
25 INJECTION, SOLUTION INTRAVENOUS ONCE
Status: COMPLETED | OUTPATIENT
Start: 2023-06-23 | End: 2023-06-23

## 2023-06-23 RX ORDER — IODINE/SODIUM IODIDE 2 %
TINCTURE TOPICAL 4 TIMES DAILY
Status: DISCONTINUED | OUTPATIENT
Start: 2023-06-23 | End: 2023-06-28 | Stop reason: HOSPADM

## 2023-06-23 RX ADMIN — DEXTROSE MONOHYDRATE 25 ML: 25 INJECTION, SOLUTION INTRAVENOUS at 11:18

## 2023-06-23 RX ADMIN — SODIUM BICARBONATE 75 ML/HR: 84 INJECTION, SOLUTION INTRAVENOUS at 16:00

## 2023-06-23 RX ADMIN — OXYCODONE HYDROCHLORIDE 10 MG: 10 TABLET ORAL at 21:50

## 2023-06-23 RX ADMIN — SODIUM BICARBONATE 650 MG TABLET 1300 MG: at 16:40

## 2023-06-23 RX ADMIN — HEPARIN SODIUM 5000 UNITS: 5000 INJECTION INTRAVENOUS; SUBCUTANEOUS at 21:50

## 2023-06-23 RX ADMIN — TRAZODONE HYDROCHLORIDE 50 MG: 50 TABLET ORAL at 21:50

## 2023-06-23 RX ADMIN — TAMSULOSIN HYDROCHLORIDE 0.4 MG: 0.4 CAPSULE ORAL at 16:40

## 2023-06-23 RX ADMIN — SODIUM BICARBONATE 50 MEQ: 84 INJECTION, SOLUTION INTRAVENOUS at 11:36

## 2023-06-23 RX ADMIN — DIPHENHYDRAMINE HYDROCHLORIDE, ZINC ACETATE: 2; .1 CREAM TOPICAL at 14:47

## 2023-06-23 RX ADMIN — INSULIN LISPRO 2 UNITS: 100 INJECTION, SOLUTION INTRAVENOUS; SUBCUTANEOUS at 21:51

## 2023-06-23 RX ADMIN — VANCOMYCIN HYDROCHLORIDE 1500 MG: 5 INJECTION, POWDER, LYOPHILIZED, FOR SOLUTION INTRAVENOUS at 18:41

## 2023-06-23 RX ADMIN — HEPARIN SODIUM 5000 UNITS: 5000 INJECTION INTRAVENOUS; SUBCUTANEOUS at 05:27

## 2023-06-23 RX ADMIN — INSULIN LISPRO 4 UNITS: 100 INJECTION, SOLUTION INTRAVENOUS; SUBCUTANEOUS at 16:41

## 2023-06-23 RX ADMIN — INSULIN LISPRO 6 UNITS: 100 INJECTION, SOLUTION INTRAVENOUS; SUBCUTANEOUS at 12:12

## 2023-06-23 RX ADMIN — ALTEPLASE 2 MG: 2.2 INJECTION, POWDER, LYOPHILIZED, FOR SOLUTION INTRAVENOUS at 07:48

## 2023-06-23 RX ADMIN — SODIUM ZIRCONIUM CYCLOSILICATE 10 G: 10 POWDER, FOR SUSPENSION ORAL at 11:28

## 2023-06-23 RX ADMIN — INSULIN HUMAN 10 UNITS: 100 INJECTION, SOLUTION PARENTERAL at 11:18

## 2023-06-23 RX ADMIN — HEPARIN SODIUM 5000 UNITS: 5000 INJECTION INTRAVENOUS; SUBCUTANEOUS at 14:49

## 2023-06-23 RX ADMIN — INSULIN GLARGINE 20 UNITS: 100 INJECTION, SOLUTION SUBCUTANEOUS at 21:50

## 2023-06-23 RX ADMIN — SODIUM BICARBONATE 650 MG TABLET 1300 MG: at 21:50

## 2023-06-23 NOTE — PROGRESS NOTES
[address] [facility]  Progress Note  Name: Samia Catherine  MRN: [id number]  Unit/Bed#: S -90 I Date of Admission: 6/20/2023   Date of Service: 6/23/2023 I Hospital Day: 3    Assessment/Plan   * ABILIO (acute kidney injury) [facility])  Assessment & Plan  · Initial suspicion for ABILIO was suspected secondary to urinary retention however in spite of getting a Tejeda catheter and no hydronephrosis or bladder urine retention-patient's creatinine still getting worse. Baseline creatinine is around 1.1-0.9. Most likely etiology for ABILIO seems to be ATN/interstitial nephritis from unclear etiology. Creatinine seems to have stabilized now slightly downtrending. Continue IV fluid. · No urgent need for dialysis. · Continue to hold Dyazide and losartan. · Nephrology following. Postoperative infection of surgical wound  Assessment & Plan  · Patient is s/p reverse total shoulder arthoplasty on 4/423 and s/p I&D of left shoulder on 5/2/23 with [facility] for left shoulder surgical site infection with abscess and possible prosthetic infection and is now s/p explantation and spacer placement on 6/13/23 with cultures growing cutibacterium avidum and staphylococcus epidermidis. · Discharged from [facility] on 6/19/23 on IV daptomycin 700 mg q24h for 6 weeks (until 7/28/23). · Patient now switched to IV vancomycin from daptomycin because of concern for acute kidney injury. Bilateral lower extremity edema  Assessment & Plan  · Patient reports worsening b/l lower extremity edema since his recent hospitalization. · RLE edema > LLE on exam which patient reports is typical for his edema. · BNP mildly elevated at 259. · Recent echo on 6/16/23 with EF 60%, G1DD, mild AS. · Not on Lasix as an outpatient; prescribed Dyazide which will be held in the setting of ABILIO. · Venous Doppler negative for DVT  · Elevate extremities.      Pancytopenia ([address])  Assessment & Plan  · In the setting of cirrhosis  · Blood counts stable    Alcoholic cirrhosis ([address])  Assessment & Plan  · CT shows "cirrhotic liver with portal hypertension. Splenomegaly. Trace ascites."  · Follows with GI as an outpatient. Essential hypertension  Assessment & Plan  · Stable. · Continue Cardizem. · Losartan on hold. Insulin-dependent diabetes mellitus  Assessment & Plan  Lab Results   Component Value Date    HGBA1C 8.4 (H) 2023       Recent Labs     23  2100 23  0722 23  1123 23  1555   POCGLU 194* 146* 269* 214*       Blood Sugar Average: Last 72 hrs:  (P) [id number]   • Hold Amaryl while inpatient. • Monitor accu-checks AC and HS. • Continue Lantus 20 units with sliding scale coverage  • SSI coverage. • Hypoglycemia protocol. VTE Pharmacologic Prophylaxis:   Pharmacologic: Heparin  Mechanical VTE Prophylaxis in Place: Yes    Patient Centered Rounds: I have performed bedside rounds with nursing staff today. Discussions with Specialists or Other Care Team Provider: nephrology    Education and Discussions with Family / Patient: wife and daughters at bedside    Time Spent for Care: 30 minutes. More than 50% of total time spent on counseling and coordination of care as described above. Current Length of Stay: 3 day(s)    Current Patient Status: Inpatient   Certification Statement: The patient will continue to require additional inpatient hospital stay due to above    Discharge Plan: mid next week    Code Status: Level 1 - Full Code      Subjective:     Pt seen and examined by me in afternoon. Pt pain of itching all over. He has history of eczema and normally has itching but feels worse now.     Objective:     Vitals:   Temp (24hrs), Av.9 °F (36.6 °C), Min:97.6 °F (36.4 °C), Max:98.3 °F (36.8 °C)    Temp:  [97.6 °F (36.4 °C)-98.3 °F (36.8 °C)] 97.6 °F (36.4 °C)  HR:  [] 107  Resp:  [16-18] 16  BP: (128-139)/(62-68) 136/65  SpO2:  [99 %-100 %] 99 %  Body mass index is 36.5 kg/m². Input and Output Summary (last 24 hours): Intake/Output Summary (Last 24 hours) at 6/23/2023 1805  Last data filed at 6/23/2023 1329  Gross per 24 hour   Intake --   Output 5300 ml   Net -5300 ml       Physical Exam:     Physical Exam    Constitutional: Pt appears comfortable. Not in any acute distress. HENT:   Head: Normocephalic and atraumatic. Eyes: EOM are normal.   Neck: Neck supple. Cardiovascular: Normal rate, regular rhythm, normal heart sounds. No murmur heard. Pulmonary/Chest: Effort normal, air entry b/l equal. No respiratory distress. Pt has no wheezes or crackles. Abdominal: Soft. Non-distended, Non-tender. Bowel sounds are normal.   Musculoskeletal: Normal range of motion. Neurological: awake, alert. Moving all extremities spontaneously. Psychiatric: normal mood and affect. Additional Data:     Labs:    Results from last 7 days   Lab Units 06/21/23  0451   WBC Thousand/uL 3.53*   HEMOGLOBIN g/dL 8.0*   HEMATOCRIT % 25.4*   PLATELETS Thousands/uL 86*   NEUTROS PCT % 59   LYMPHS PCT % 21   MONOS PCT % 15*   EOS PCT % 5     Results from last 7 days   Lab Units 06/23/23  1653 06/23/23  0542   SODIUM mmol/L 138 136   POTASSIUM mmol/L 4.7 5.9*   CHLORIDE mmol/L 104 109*   CO2 mmol/L 27 19*   BUN mg/dL 51* 52*   CREATININE mg/dL 4.09* 4.66*   ANION GAP mmol/L 7 8   CALCIUM mg/dL 8.6 8.2*   ALBUMIN g/dL  --  2.8*   TOTAL BILIRUBIN mg/dL  --  0.89   ALK PHOS U/L  --  167*   ALT U/L  --  24   AST U/L  --  61*   GLUCOSE RANDOM mg/dL 200* 126         Results from last 7 days   Lab Units 06/23/23  1555 06/23/23  1123 06/23/23  0722 06/22/23  2100 06/22/23  1637 06/22/23  1114 06/22/23  0620 06/21/23  2106 06/21/23  1603 06/21/23  1107 06/21/23  0733 06/20/23  2105   POC GLUCOSE mg/dl 214* 269* 146* 194* 198* 146* 94 169* 143* 158* 82 165*                   * I Have Reviewed All Lab Data Listed Above. * Additional Pertinent Lab Tests Reviewed:  [address] Admission Reviewed    Imaging:    Imaging Reports Reviewed Today Include:   Imaging Personally Reviewed by Myself Includes:      Recent Cultures (last 7 days):           Last 24 Hours Medication List:   Current Facility-Administered Medications   Medication Dose Route Frequency Provider Last Rate   • calamine-zinc oxide   Topical 4x Daily Stevie Winn MD     • diphenhydrAMINE-zinc acetate   Topical TID PRN Neli Ash MD     • heparin (porcine)  5,000 Units Subcutaneous [facility] Mello Moran PA-C     • insulin glargine  20 Units Subcutaneous HS Abraham Dickson MD     • insulin lispro  1-5 Units Subcutaneous HS Mello Moran PA-C     • insulin lispro  2-12 Units Subcutaneous TID AC Mello Moran PA-C     • oxyCODONE  10 mg Oral Q6H PRN Mello Moran PA-C     • sodium bicarbonate  1,300 mg Oral TID Car Law MD     • Sodium Zirconium Cyclosilicate  10 g Oral Daily Car Law MD     • tamsulosin  0.4 mg Oral Daily With Paulo Hamm MD     • traZODone  50 mg Oral HS PRN Mello Moran PA-C     • vancomycin  15 mg/kg (Adjusted) Intravenous Daily PRN [employer], CRNP     • vancomycin  15 mg/kg (Adjusted) Intravenous Once Stevie Winn MD          Today, Patient Was Seen By: Stevie Winn MD    ** Please Note: Dictation voice to text software may have been used in the creation of this document.  **

## 2023-06-23 NOTE — ASSESSMENT & PLAN NOTE
· Patient is s/p reverse total shoulder arthoplasty on 4/423 and s/p I&D of left shoulder on 5/2/23 with recent admission at [facility] for left shoulder surgical site infection with abscess and possible prosthetic infection and is now s/p explantation and spacer placement on 6/13/23 with cultures growing cutibacterium avidum and staphylococcus epidermidis. · Discharged from [facility] on 6/19/23 on IV daptomycin 700 mg q24h for 6 weeks (until 7/28/23). · Patient now switched to IV vancomycin from daptomycin because of concern for acute kidney injury.

## 2023-06-23 NOTE — CASE MANAGEMENT
Case Management Assessment & Discharge Planning Note    Patient name Marquis Thomas  Location S /S -45 MRN [id number]  : 1955 Date 2023       Current Admission Date: 2023  Current Admission Diagnosis:ABILIO (acute kidney injury) [facility])   Patient Active Problem List    Diagnosis Date Noted   • Urinary retention 2023   • Bilateral lower extremity edema 2023   • Acute kidney injury superimposed on chronic kidney disease stage 3 06/15/2023   • Murmur 06/15/2023   • Postoperative infection of surgical wound 2023   • Ulcer of left foot, limited to breakdown of skin ([address]) 2023   • Aftercare following left shoulder joint replacement surgery 2023   • S/P reverse total shoulder arthroplasty, left 2023   • Obesity, morbid ([address]) 2022   • Post-traumatic osteoarthritis of left shoulder 2022   • Morbid obesity ([address]) 2022   • Cellulitis 2022   • Diabetic ulcer of left foot associated with type 2 diabetes mellitus ([address]) 2022   • Leukopenia 2022   • Insomnia 2020   • Elevated troponin 2020   • Chest pressure 2020   • Alcohol abuse 2020   • Insulin-dependent diabetes mellitus 2020   • Essential hypertension 2020   • ABILIO (acute kidney injury) ([address]) 2020   • Cholelithiasis    • Alcoholic cirrhosis ([address])    • MEG (obstructive sleep apnea) 2020   • Pancytopenia ([address]) 2020   • Thrombocytopenia ([address]) 2020      LOS (days): 3  Geometric Mean LOS (GMLOS) (days): 5.00  Days to GMLOS:1.7     OBJECTIVE:  PATIENT READMITTED TO HOSPITAL  Risk of Unplanned Readmission Score: 24.67         Current admission status: Inpatient       Preferred Pharmacy:   [address]  [address] [address]  Phone: [phone] Fax: [phone]    Primary Care Provider: Jon Lyons MD    Primary Insurance: MEDICARE  Secondary Insurance: AETNA    ASSESSMENT:  [address], [address] - Spouse   Primary Phone: [phone] (Mobile)  Home Phone: [phone]                         Readmission Root Cause  30 Day Readmission: Yes  Who directed you to return to the hospital?: Self  Did you understand whom to contact if you had questions or problems?: Yes  Did you get your prescriptions before you left the hospital?: Yes ([facility] delivered meds to patient prior to DC)  Were you able to get your prescriptions filled when you left the hospital?: Yes  Did you take your medications as prescribed?: Yes  Were you able to get to your follow-up appointments?: No  Reason[de-identified] Readmitted prior to appointment  During previous admission, was a post-acute recommendation made?: Yes  What post-acute resources were offered?: [facility]  Patient was readmitted due to: d/c from hospital day prior to ER and has not voided since noon yesterday. Pt denies abd pressure, and reports normal intake of fluids. Action Plan: ID; nephrology; ortho    Patient Information  Admitted from[de-identified] Facility  Mental Status: Alert  During Assessment patient was accompanied by: Daughter  Assessment information provided by[de-identified] Patient  Primary Caregiver: Self  Support Systems: Self, Spouse/significant other, Family members  [place] of Residence: [address] do you live in?: [address] entry access options. Select all that apply.: Stairs  Number of steps to enter home. : 1  Do the steps have railings?: Yes  Type of Current Residence: [place]  In the last 12 months, was there a time when you were not able to pay the mortgage or rent on time?: No  In the last 12 months, how many places have you lived?: 1  In the last 12 months, was there a time when you did not have a steady place to sleep or slept in a shelter (including now)?: No  Homeless/housing insecurity resource given?: N/A  Living Arrangements: Lives w/ Spouse/significant other  Is patient a ?: No    Activities of Daily Living Prior to Admission  Functional Status: Independent  Completes ADLs independently?: Yes  Ambulates independently?: Yes  Does patient use assisted devices?: No  Does patient currently own DME?: No  Does patient have a history of Outpatient Therapy (PT/OT)?: Yes  Does the patient have a history of Short-Term Rehab?: No  Does patient have a history of HHC?: No  Does patient currently have [facility]?: Yes    Current Home Health Care  Type of Current Home Care Services: Nurse visit  Current Home Health Agency[de-identified] [address] [place] [address] Provider[de-identified] PCP    Patient Information Continued  Income Source: Pension/detention  Does patient have prescription coverage?: Yes  Within the past 12 months, you worried that your food would run out before you got the money to buy more.: Never true  Within the past 12 months, the food you bought just didn't last and you didn't have money to get more.: Never true  Food insecurity resource given?: N/A  Does patient receive dialysis treatments?: No  Does patient have a history of substance abuse?: No  Does patient have a history of Mental Health Diagnosis?: No         Means of Transportation  Means of Transport to Appts[de-identified] Drives Self  In the past 12 months, has lack of transportation kept you from medical appointments or from getting medications?: No  In the past 12 months, has lack of transportation kept you from meetings, work, or from getting things needed for daily living?: No  Was application for public transport provided?: N/A        DISCHARGE DETAILS:    Discharge planning discussed with[de-identified] Patient and daughter  Freedom of Choice: Yes  Comments - Freedom of Choice: Discussed current set up with [facility] and [facility]  CM contacted family/caregiver?: Yes  Were Treatment Team discharge recommendations reviewed with patient/caregiver?: Yes  Did patient/caregiver verbalize understanding of patient care needs?: Yes  Were patient/caregiver advised of the risks associated with not following Treatment Team discharge recommendations?: Yes    Contacts  Patient Contacts: Sana Raphael (Spouse)  Relationship to Patient[de-identified] Family  Contact Method: Phone  Phone Number: [phone]  Reason/Outcome: Discharge Planning, Emergency Contact, Continuity of [address]         Is the patient interested in [facility] at discharge?: Yes  [facility] requested[de-identified] [facility] Name[de-identified] Amadou Provider[de-identified] PCP  Home Health Services Needed[de-identified] Administration of IV, IM or SC Medications, Evaluate Functional Status and Safety, Diabetes Management  Homebound Criteria Met[de-identified] Requires the Assistance of Another Person for Safe Ambulation or to Leave the Home  Supporting Clincal Findings[de-identified] Fatigues Easliy in [employer], Limited Endurance    DME Referral Provided  Referral made for DME?: No    Other Referral/Resources/Interventions Provided:  Interventions: [facility], Home [facility]fusion         Treatment Team Recommendation: Home with [facility]  Discharge Destination Plan[de-identified] Home with [address] [facility] at Discharge : Family                                         CM met with patient at bedside. Also present was patient's daughter. CM name and role reviewed. CM assessment completed and charted above. Patient was discharged [facility] with [facility] and [facility] for IV ABX however admitted back to the hospital prior to his first visit. CM confirmed with [facility] that they will reschedule first visit once DC is known. CM also spoke to [facility] as there is a chance the IV ABX may change at DC. CM was told that patient should keep ABX that were delivered in the fridge until it is known for sure if ABX will be changing. CM reviewed discharge planning process including the following: identifying caregivers at home, preference for d/c planning needs, and discharge planning.   CM will continue to follow for care coordination and update assessment as necessary.

## 2023-06-23 NOTE — ASSESSMENT & PLAN NOTE
· Initial suspicion for ABILIO was suspected secondary to urinary retention however in spite of getting a Tejeda catheter and no hydronephrosis or bladder urine retention-patient's creatinine still getting worse. Baseline creatinine is around 1.1-0.9. Most likely etiology for ABILIO seems to be ATN/interstitial nephritis from unclear etiology. Creatinine seems to have stabilized now slightly downtrending. Continue IV fluid. · No urgent need for dialysis. · Continue to hold Dyazide and losartan. · Nephrology following.

## 2023-06-23 NOTE — ASSESSMENT & PLAN NOTE
Lab Results   Component Value Date    HGBA1C 8.4 (H) 03/20/2023       Recent Labs     06/22/23  2100 06/23/23  0722 06/23/23  1123 06/23/23  1555   POCGLU 194* 146* 269* 214*       Blood Sugar Average: Last 72 hrs:  (P) [id number]   • Hold Amaryl while inpatient. • Monitor accu-checks AC and HS. • Continue Lantus 20 units with sliding scale coverage  • SSI coverage. • Hypoglycemia protocol.

## 2023-06-23 NOTE — PROGRESS NOTES
Progress Note - Infectious Disease   Cindy Rivera 69 y.o. male MRN: [id number]  Unit/Bed#: S -01 Encounter: [id number]      Impression/Plan:  1. 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 Patient's initial arthroplasty on 4/4/2023. Post operatively he developed purulent weeping from the site and shoulder imaging revealed a fluid collection communicating to the joint space. Cultures from I&D 5/2/2023 grew coagulase-negative staph and alphahemolytic strep in broth only, as well as growth of cutibacterium acnes and avidum. He then underwent L shoulder resection arthroplasty with insertion of antibiotic spacer on 6/13/2023. OR cultures grew Cutibacterium avidum, MRSE, and micrococcus luteus. Plan was for 6 weeks of antibiotic and he remains under active treatment. Now course complicated by #2.   -Continue vancomycin  -Pharmacy consulted for vancomycin monitoring  -check CBCD and BMP tomorrow  -monitor vitals  -serial L shoulder exams  -surgical site care per orthopedic surgery  -continue follow up with orthopedic surgery  -We will adjust outpatient regimen and ID follow-up closer to discharge     2. ABILIO. Baseline 1-1.2. Elevated on admission. Failed to improve despite Cadena catheter placement. Suspicion for antibiotic reaction. Possibly plateauing today. Discussed with nephrology previously and there is consideration for possible steroids. Risk of infection reviewed with patient and family previously versus potential benefits in terms of renal function.  -antibiotic now changed as above  -volume management per primary service/nephrology   -check creatinine tomorrow  -serial exams and care of cadena  -monitor urine output  -continue close follow up with nephrology  -No absolute contraindication from ID standpoint for steroids if they may lead to improvement in renal function.     3. Chronic leukopenia and thrombocytopenia. Likely due to patient's cirrhosis.  CBCD currently stable. This does limit use of certain drugs like linezolid for #1.  -resume outpatient GI and hematology follow up with [facility] after discharge  -Continue antibiotic otherwise as above     4. Type 2 diabetes mellitus with long term insulin use. Patient's last HbA1c was 8.4% on 3/20/2023. Elevated blood glucose is risk factor for infection and complications with healing. Recommend tight glycemic control per primary     5. Obesity. BMI = 36.59.   -Will dose adjust antibiotics for patient weight as needed      6. Antibiotic allergy. Patient reports hives with sulfa antibiotics. We will avoid this drug class for now.  -monitor patient for adverse medication reactions     Above plan discussed in detail with the patient at bedside. Above plan discussed with primary service. ID consult service will reevaluate this patient again on Monday. Please contact ID attending on call if questions in the interim. Antibiotics:  Vancomycin 2  Total antibiotic 4    24 Hour events:  Yesterday and overnight notes reviewed no acute events noted    Subjective:  Patient currently denies having any nausea, vomiting, chest pain or shortness of breath. Tolerating current antibiotic without itching or rash. He continues to report significant urine output overnight. Reports some mild subjective improvement in the swelling of his legs. Objective:  Vitals:  Temp:  [97.7 °F (36.5 °C)-98.3 °F (36.8 °C)] 98.3 °F (36.8 °C)  HR:  [] 104  Resp:  [16-18] 16  BP: (128-139)/(61-68) 139/68  SpO2:  [98 %-100 %] 100 %  Temp (24hrs), Av.9 °F (36.6 °C), Min:97.7 °F (36.5 °C), Max:98.3 °F (36.8 °C)  Current: Temperature: 98.3 °F (36.8 °C)    Physical Exam:   General Appearance:  Alert, interactive, nontoxic, no acute distress. Sitting up in chair and able to provide history. Throat: Oropharynx moist without lesions.     Lungs:   Clear to auscultation bilaterally; no wheezes, rhonchi or rales; respirations unlabored on room air   Heart: RRR; no murmur, rub or gallop noted   Abdomen:   Soft, non-tender, non-distended, positive bowel sounds. Extremities: No clubbing, cyanosis; continued tense edema in the lower legs bilaterally; surgical site bandage reviewed and patient has a new area of strikethrough on the more proximal shoulder. Dried drainage noted at the bottom of the bandage   Skin: No new rashes or lesions. No new draining wounds noted. PICC like insertion site looks unremarkable. Patient however requiring Cathflo this morning. Labs, Imaging, & Other studies:   All pertinent labs and imaging studies in PACS were personally reviewed as below. Results from last 7 days   Lab Units 06/21/23  0451 06/20/23  0339 06/19/23  0458   WBC Thousand/uL 3.53* 3.91* 3.30*   HEMOGLOBIN g/dL 8.0* 9.0* 9.6*   PLATELETS Thousands/uL 86* 91* 91*     Results from last 7 days   Lab Units 06/23/23  0542 06/20/23  1811 06/20/23  0339   POTASSIUM mmol/L 5.9*   < > 4.3   CHLORIDE mmol/L 109*   < > 104   CO2 mmol/L 19*   < > 25   BUN mg/dL 52*   < > 28*   CREATININE mg/dL 4.66*   < > 2.29*   EGFR ml/min/1.73sq m 11   < > 28   CALCIUM mg/dL 8.2*   < > 8.7   AST U/L 61*  --  40*   ALT U/L 24  --  27   ALK PHOS U/L 167*  --  161*    < > = values in this interval not displayed. Lab interpretation/comments: Creatinine seems to be plateauing. Potassium elevated this morning. Recent CK unremarkable. White count and platelets otherwise stable.     Imaging interpretation/comments: No new images overnight    Culture data: No new culture data

## 2023-06-23 NOTE — PROGRESS NOTES
Gorge Diallo is a 69 y.o. male who is currently ordered Vancomycin IV with management by the Pharmacy Consult service. Relevant clinical data and objective / subjective history reviewed. Vancomycin Assessment:  Indication and Goal AUC/Trough: Bone/joint infection (goal -600, trough >10), -600, trough >10  Clinical Status: stable  Micro:     Renal Function:  SCr: 4.09 mg/dL  CrCl: 22.4 mL/min  Renal replacement: Not on dialysis  Days of Therapy: 2  Current Dose: 1500 mg IV daily as needed for random level <15  Vancomycin Plan:  New Dosing: continue current dose  Next Level: 06/24 at 1800  Renal Function Monitoring: Daily BMP and UOP  Pharmacy will continue to follow closely for s/sx of nephrotoxicity, infusion reactions and appropriateness of therapy. BMP and CBC will be ordered per protocol. We will continue to follow the patient’s culture results and clinical progress daily.     Bettyann Cogan, Pharmacist

## 2023-06-23 NOTE — ASSESSMENT & PLAN NOTE
· Patient reports worsening b/l lower extremity edema since his recent hospitalization. · RLE edema > LLE on exam which patient reports is typical for his edema. · BNP mildly elevated at 259. · Recent echo on 6/16/23 with EF 60%, G1DD, mild AS. · Not on Lasix as an outpatient; prescribed Dyazide which will be held in the setting of ABILIO. · Venous Doppler negative for DVT  · Elevate extremities.

## 2023-06-23 NOTE — PROGRESS NOTES
[address] NOTE   Tarun Norris 69 y.o. male MRN: [id number]  Unit/Bed#: S MS 07[place]-38 Encounter: [id number]  Reason for Consult: ABILIO    ASSESSMENT and PLAN:  1. Acute kidney injury (POA)  • Baseline creatinine around 1.0-1.2. • Creatinine was 0.94 at [facility] on 6/19/23. • Admission creatinine 2.29 on 6/20/23  • Work up: CT imaging did not show any hydronephrosis. UA with pyuria but no hematuria or proteinuria. • Initial etiology was felt to be urinary retention vs possible ATN. • However, no improvement with cadena catheter noted and differential dx changed to ATN vs AIN. • Creatinine up to 4.78 yesterday. Better today at 4.66.  • Clinically behaving like ATN but etiology for ATN is not clear. Cannot rule out AIN from Daptomycin. • If renal function continues to improve, will no longer need renal biopsy or consideration for steroids. • He is hyperkalemic but we will plan to manage this medically.     2. Metabolic acidosis:  • CO2 up to 19. • May stop bicarbonate drip and start oral bicarbonate tablets. 3. Hyperkalemia:  • K 5.9.   • Give insulin + D50, 1 amp sodium bicarbonate. • Start Lokelma 10 g daily. • Low K diet. • Repeat BMP at 5 pm.      4. Urinary retention:  • Cadena catheter inserted June 20. • Now on tamsulosin 0.4 mg daily.     3. Left shoulder prosthetic joint infection  • Was on Daptomycin. • Now on Vancomycin.      4. Diabetes. • Per [facility]      5. Hypertension:  • BP is controlled.   • Current meds: none.     DISPOSITION:  · Stop bicarbonate drip. · Give insulin + D50. · Give 1 amp of sodium bicarbonate. · Start Lokelma 10 gm daily. · Repeat BMP at 5 pm.   · No urgent need for HD. · Start sodium bicarbonate 1300 mg TID. SUBJECTIVE / 24H INTERVAL HISTORY:  UO 5200 cc. No CP or SOB. Complaining of itching.      OBJECTIVE:  Current Weight: Weight - Scale: 119 kg (261 lb 11 oz)  Vitals:    06/22/23 2100 06/23/23 0542 06/23/23 0723 06/23/23 1521   BP: 128/62  139/68 136/65   BP Location: Right arm      Pulse: 93  104 (!) 107   Resp: 18  16 16   Temp: 97.7 °F (36.5 °C)  98.3 °F (36.8 °C) 97.6 °F (36.4 °C)   TempSrc: Oral      SpO2: 100%  100% 99%   Weight:  119 kg (261 lb 11 oz)         Intake/Output Summary (Last 24 hours) at 6/23/2023 1613  Last data filed at 6/23/2023 1329  Gross per 24 hour   Intake --   Output 5300 ml   Net -5300 ml     General: conscious, cooperative, no distress  Skin: dry  Eyes: pink conjunctivae  ENT: moist mucous membranes  Respiratory: equal chest expansion, clear breath sounds. Cardiovascular: distinct heart sounds, normal rate, regular rhythm, no rub  Abdomen: soft, non tender, non distended, normal bowel sounds  Extremities: mild LE edema. Genitourinary: (+)  cadena catheter. Clear urine. Neuro: awake, alert.    Psych: appropriate affect    Medications:    Current Facility-Administered Medications:   •  calamine-zinc oxide lotion, , Topical, 4x Daily, Hannah Loera MD  •  diphenhydrAMINE-zinc acetate (BENADRYL) 2-0.1 % cream, , Topical, TID PRN, Jose Angel Borges MD, Given at 06/23/23 1447  •  heparin (porcine) subcutaneous injection 5,000 Units, 5,000 Units, Subcutaneous, Q8H [facility], 5,000 Units at 06/23/23 1449 **AND** [CANCELED] Platelet count, , , Once, Jarek Soto PA-C  •  insulin glargine (LANTUS) subcutaneous injection 20 Units 0.2 mL, 20 Units, Subcutaneous, HS, Neli Fletcher MD, 20 Units at 06/22/23 2159  •  insulin lispro (HumaLOG) 100 units/mL subcutaneous injection 1-5 Units, 1-5 Units, Subcutaneous, HS, Lou Amaya PA-C, 1 Units at 06/22/23 2200  •  insulin lispro (HumaLOG) 100 units/mL subcutaneous injection 2-12 Units, 2-12 Units, Subcutaneous, TID AC, 6 Units at 06/23/23 1212 **AND** Fingerstick Glucose (POCT), , , TID AC, Lou Amaya PA-C  •  oxyCODONE (ROXICODONE) immediate release tablet 10 mg, 10 mg, Oral, Q6H PRN, Jarek Soto PA-C, 10 mg at 06/22/23 5902  • sodium bicarbonate 150 mEq in dextrose 5 % 1,000 mL infusion, 75 mL/hr, Intravenous, Continuous, Ana Mathis MD, Last Rate: 75 mL/hr at 06/23/23 1600, 75 mL/hr at 06/23/23 1600  •  Sodium Zirconium Cyclosilicate (Lokelma) 10 g, 10 g, Oral, Daily, Naya Shen MD, 10 g at 06/23/23 1128  •  tamsulosin (FLOMAX) capsule 0.4 mg, 0.4 mg, Oral, Daily With Dinner, Mya Espinal MD, 0.4 mg at 06/22/23 1736  •  traZODone (DESYREL) tablet 50 mg, 50 mg, Oral, HS PRN, Edmond Carney PA-C, 50 mg at 06/22/23 2230  •  vancomycin (VANCOCIN) 1500 mg in sodium chloride 0.9% 250 mL IVPB, 15 mg/kg (Adjusted), Intravenous, Daily PRN, [address]    Laboratory Results:  Results from last 7 days   Lab Units 06/23/23  0542 06/22/23  0459 06/21/23  0451 06/20/23  1811 06/20/23  0339 06/19/23  0458 06/18/23  0517 06/17/23  0553   WBC Thousand/uL  --   --  3.53*  --  3.91* 3.30* 2.77* 3.43*   HEMOGLOBIN g/dL  --   --  8.0*  --  9.0* 9.6* 9.3* 9.8*   HEMATOCRIT %  --   --  25.4*  --  28.9* 30.3* 29.3* 31.2*   PLATELETS Thousands/uL  --   --  86*  --  91* 91* 82* 96*   POTASSIUM mmol/L 5.9* 5.3 4.4 4.8 4.3 4.4 4.2 4.3   CHLORIDE mmol/L 109* 108 106 103 104 107 107 105   CO2 mmol/L 19* 15* 23 23 25 24 23 25   BUN mg/dL 52* 50* 38* 35* 28* 16 20 25   CREATININE mg/dL 4.66* 4.78* 4.07* 3.35* 2.29* 0.94 0.96 1.14   CALCIUM mg/dL 8.2* 8.0* 8.1* 8.2* 8.7 8.9 8.7 8.9   PHOSPHORUS mg/dL 4.8*  --   --   --   --   --   --   --

## 2023-06-24 LAB
ANION GAP SERPL CALCULATED.3IONS-SCNC: 7 MMOL/L
BASOPHILS # BLD AUTO: 0.01 THOUSANDS/ÂΜL (ref 0–0.1)
BASOPHILS NFR BLD AUTO: 0 % (ref 0–1)
BUN SERPL-MCNC: 47 MG/DL (ref 5–25)
CALCIUM SERPL-MCNC: 8.4 MG/DL (ref 8.4–10.2)
CHLORIDE SERPL-SCNC: 106 MMOL/L (ref 96–108)
CK SERPL-CCNC: 168 U/L (ref 39–308)
CO2 SERPL-SCNC: 28 MMOL/L (ref 21–32)
CREAT SERPL-MCNC: 3.78 MG/DL (ref 0.6–1.3)
EOSINOPHIL # BLD AUTO: 0.22 THOUSAND/ÂΜL (ref 0–0.61)
EOSINOPHIL NFR BLD AUTO: 9 % (ref 0–6)
ERYTHROCYTE [DISTWIDTH] IN BLOOD BY AUTOMATED COUNT: 15.9 % (ref 11.6–15.1)
GFR SERPL CREATININE-BSD FRML MDRD: 15 ML/MIN/1.73SQ M
GLUCOSE SERPL-MCNC: 105 MG/DL (ref 65–140)
GLUCOSE SERPL-MCNC: 152 MG/DL (ref 65–140)
GLUCOSE SERPL-MCNC: 178 MG/DL (ref 65–140)
GLUCOSE SERPL-MCNC: 225 MG/DL (ref 65–140)
GLUCOSE SERPL-MCNC: 86 MG/DL (ref 65–140)
HCT VFR BLD AUTO: 25.6 % (ref 36.5–49.3)
HGB BLD-MCNC: 8 G/DL (ref 12–17)
IMM GRANULOCYTES # BLD AUTO: 0.01 THOUSAND/UL (ref 0–0.2)
IMM GRANULOCYTES NFR BLD AUTO: 0 % (ref 0–2)
LYMPHOCYTES # BLD AUTO: 0.5 THOUSANDS/ÂΜL (ref 0.6–4.47)
LYMPHOCYTES NFR BLD AUTO: 20 % (ref 14–44)
MCH RBC QN AUTO: 26.8 PG (ref 26.8–34.3)
MCHC RBC AUTO-ENTMCNC: 31.3 G/DL (ref 31.4–37.4)
MCV RBC AUTO: 86 FL (ref 82–98)
MONOCYTES # BLD AUTO: 0.35 THOUSAND/ÂΜL (ref 0.17–1.22)
MONOCYTES NFR BLD AUTO: 14 % (ref 4–12)
NEUTROPHILS # BLD AUTO: 1.47 THOUSANDS/ÂΜL (ref 1.85–7.62)
NEUTS SEG NFR BLD AUTO: 57 % (ref 43–75)
NRBC BLD AUTO-RTO: 0 /100 WBCS
PHOSPHATE SERPL-MCNC: 4.7 MG/DL (ref 2.3–4.1)
PLATELET # BLD AUTO: 89 THOUSANDS/UL (ref 149–390)
PMV BLD AUTO: 12.1 FL (ref 8.9–12.7)
POTASSIUM SERPL-SCNC: 3.9 MMOL/L (ref 3.5–5.3)
RBC # BLD AUTO: 2.98 MILLION/UL (ref 3.88–5.62)
SODIUM SERPL-SCNC: 141 MMOL/L (ref 135–147)
VANCOMYCIN SERPL-MCNC: 15.1 UG/ML (ref 10–20)
WBC # BLD AUTO: 2.56 THOUSAND/UL (ref 4.31–10.16)

## 2023-06-24 PROCEDURE — 84100 ASSAY OF PHOSPHORUS: CPT | Performed by: INTERNAL MEDICINE

## 2023-06-24 PROCEDURE — 99221 1ST HOSP IP/OBS SF/LOW 40: CPT | Performed by: PODIATRIST

## 2023-06-24 PROCEDURE — 85025 COMPLETE CBC W/AUTO DIFF WBC: CPT | Performed by: INTERNAL MEDICINE

## 2023-06-24 PROCEDURE — 82948 REAGENT STRIP/BLOOD GLUCOSE: CPT

## 2023-06-24 PROCEDURE — 99233 SBSQ HOSP IP/OBS HIGH 50: CPT | Performed by: INTERNAL MEDICINE

## 2023-06-24 PROCEDURE — 99232 SBSQ HOSP IP/OBS MODERATE 35: CPT | Performed by: INTERNAL MEDICINE

## 2023-06-24 PROCEDURE — 82550 ASSAY OF CK (CPK): CPT | Performed by: INTERNAL MEDICINE

## 2023-06-24 PROCEDURE — 80202 ASSAY OF VANCOMYCIN: CPT | Performed by: INTERNAL MEDICINE

## 2023-06-24 PROCEDURE — 80048 BASIC METABOLIC PNL TOTAL CA: CPT | Performed by: INTERNAL MEDICINE

## 2023-06-24 RX ADMIN — HEPARIN SODIUM 5000 UNITS: 5000 INJECTION INTRAVENOUS; SUBCUTANEOUS at 13:11

## 2023-06-24 RX ADMIN — TAMSULOSIN HYDROCHLORIDE 0.4 MG: 0.4 CAPSULE ORAL at 16:28

## 2023-06-24 RX ADMIN — INSULIN LISPRO 2 UNITS: 100 INJECTION, SOLUTION INTRAVENOUS; SUBCUTANEOUS at 11:02

## 2023-06-24 RX ADMIN — TRAZODONE HYDROCHLORIDE 50 MG: 50 TABLET ORAL at 22:28

## 2023-06-24 RX ADMIN — HEPARIN SODIUM 5000 UNITS: 5000 INJECTION INTRAVENOUS; SUBCUTANEOUS at 05:16

## 2023-06-24 RX ADMIN — HEPARIN SODIUM 5000 UNITS: 5000 INJECTION INTRAVENOUS; SUBCUTANEOUS at 22:29

## 2023-06-24 RX ADMIN — INSULIN LISPRO 2 UNITS: 100 INJECTION, SOLUTION INTRAVENOUS; SUBCUTANEOUS at 22:30

## 2023-06-24 RX ADMIN — INSULIN LISPRO 2 UNITS: 100 INJECTION, SOLUTION INTRAVENOUS; SUBCUTANEOUS at 16:15

## 2023-06-24 RX ADMIN — INSULIN GLARGINE 20 UNITS: 100 INJECTION, SOLUTION SUBCUTANEOUS at 22:29

## 2023-06-24 RX ADMIN — OXYCODONE HYDROCHLORIDE 10 MG: 10 TABLET ORAL at 22:28

## 2023-06-24 NOTE — PLAN OF CARE
Problem: PAIN - ADULT  Goal: Verbalizes/displays adequate comfort level or baseline comfort level  Description: Interventions:  - Encourage patient to monitor pain and request assistance  - Assess pain using appropriate pain scale  - Administer analgesics based on type and severity of pain and evaluate response  - Implement non-pharmacological measures as appropriate and evaluate response  - Consider cultural and social influences on pain and pain management  - Notify physician/advanced practitioner if interventions unsuccessful or patient reports new pain  Outcome: Progressing     Problem: INFECTION - ADULT  Goal: Absence or prevention of progression during hospitalization  Description: INTERVENTIONS:  - Assess and monitor for signs and symptoms of infection  - Monitor lab/diagnostic results  - Monitor all insertion sites, i.e. indwelling lines, tubes, and drains  - Monitor endotracheal if appropriate and nasal secretions for changes in amount and color  - Fletcher appropriate cooling/warming therapies per order  - Administer medications as ordered  - Instruct and encourage patient and family to use good hand hygiene technique  - Identify and instruct in appropriate isolation precautions for identified infection/condition  Outcome: Progressing     Problem: SAFETY ADULT  Goal: Patient will remain free of falls  Description: INTERVENTIONS:  - Educate patient/family on patient safety including physical limitations  - Instruct patient to call for assistance with activity   - Consult OT/PT to assist with strengthening/mobility   - Keep Call bell within reach  - Keep bed low and locked with side rails adjusted as appropriate  - Keep care items and personal belongings within reach  - Initiate and maintain comfort rounds  - Make Fall Risk Sign visible to staff  - Offer Toileting every  Hours, in advance of need  - Initiate/Maintain alarm  - Obtain necessary fall risk management equipment:   - Apply yellow socks and bracelet for high fall risk patients  - Consider moving patient to room near nurses station  Outcome: Progressing  Goal: Maintain or return to baseline ADL function  Description: INTERVENTIONS:  -  Assess patient's ability to carry out ADLs; assess patient's baseline for ADL function and identify physical deficits which impact ability to perform ADLs (bathing, care of mouth/teeth, toileting, grooming, dressing, etc.)  - Assess/evaluate cause of self-care deficits   - Assess range of motion  - Assess patient's mobility; develop plan if impaired  - Assess patient's need for assistive devices and provide as appropriate  - Encourage maximum independence but intervene and supervise when necessary  - Involve family in performance of ADLs  - Assess for home care needs following discharge   - Consider OT consult to assist with ADL evaluation and planning for discharge  - Provide patient education as appropriate  Outcome: Progressing  Goal: Maintains/Returns to pre admission functional level  Description: INTERVENTIONS:  - Perform BMAT or MOVE assessment daily.   - Set and communicate daily mobility goal to care team and patient/family/caregiver. - Collaborate with rehabilitation services on mobility goals if consulted  - Perform Range of Motion  times a day. - Reposition patient every  hours.   - Dangle patient  times a day  - Stand patient  times a day  - Ambulate patient  times a day  - Out of bed to chair  times a day   - Out of bed for meals times a day  - Out of bed for toileting  - Record patient progress and toleration of activity level   Outcome: Progressing     Problem: DISCHARGE PLANNING  Goal: Discharge to home or other facility with appropriate resources  Description: INTERVENTIONS:  - Identify barriers to discharge w/patient and caregiver  - Arrange for needed discharge resources and transportation as appropriate  - Identify discharge learning needs (meds, wound care, etc.)  - Arrange for interpretive services to assist at discharge as needed  - Refer to Case Management Department for coordinating discharge planning if the patient needs post-hospital services based on physician/advanced practitioner order or complex needs related to functional status, cognitive ability, or social support system  Outcome: Progressing

## 2023-06-24 NOTE — CONSULTS
Consultation - Yared Abebe 69 y.o. male MRN: [id number]    Unit/Bed#: S -01 Encounter: [id number]      Assessment/Plan     Assessment:  Stable subdermal hematoma of the left hallux    Plan:  -The patient subdural hematoma the left great toe is stable without any signs of infection. There are no open lesions at this point in time. The lesion can be left open to air.  -Recent venous duplex of the bilateral extremities was negative for DVT bilaterally.  -The patient will follow-up with Dr. Idalia Magana after discharge as scheduled. He states his next appointment is within the next week or so. History of Present Illness     HPI: Yared Abebe is a 69y.o. year old male who presents with a dark spot on the side of his left great toe that he just noticed recently. He cannot recall any recent injury or trauma to the left great toe. He denies any pain or discomfort to the left hallux. He was admitted secondary to ABILIO. Inpatient consult to Podiatry  Consult performed by: Maria Ines Denson DPM  Consult ordered by: Mike Ramírez MD        Review of Systems   Constitutional: Negative. HENT: Negative. Respiratory: Negative. Cardiovascular: Positive for leg swelling. Skin: Negative. Psychiatric/Behavioral: Negative. Historical Information   Past Medical History:   Diagnosis Date   • Arrhythmia    • CPAP (continuous positive airway pressure) dependence    • Diabetes mellitus ([address])    • History of echocardiogram 11/14/2017    showed EF of 50-55 percentWith moderate LVH and left ventricle diastolic dysfunction. Left atrium was moderately enlarged. Trace MR noted. • History of Holter monitoring 11/21/2017    showed baseline rhythm of sinus origin with an average heart it of 61 bpm. The lowest heart rate was 49 and the highest heart rate was 10 8 bpm. There were rare single VPCs, and frequent PACs representing 3.2% of total beats.  There were several episodes of sinus arrhythmias with sinus bradycardia and heart rate ranging from 40-90 bpm. No sustained dysrhythmias, or pauses noted. The patient did not   • Hypertension    • Liver disease    • Obese    • Sleep apnea      Past Surgical History:   Procedure Laterality Date   • CATARACT EXTRACTION     • EYE SURGERY Left    • HERNIA REPAIR  [id number]   • KNEE ARTHROPLASTY Right    • GA ARTHROPLASTY GLENOHUMERAL JOINT TOTAL SHOULDER Left 2023    Procedure: ARTHROPLASTY SHOULDER REVERSE;  Surgeon: Varghese Ring MD;  Location: [facility];  Service: Orthopedics   • GA JARED SHOULDER ARTHRPLSTY HUMERAL/GLENOID COMPNT Left 2023    Procedure: ARTHROPLASTY SHOULDER REVISION;  Surgeon: Randene Councilman;  Location: [facility];  Service: Orthopedics   • SHOULDER SURGERY Right    • WOUND DEBRIDEMENT Left 2023    Procedure: INCISION AND DRAINAGE (I&D) EXTREMITY, vac placement;  Surgeon: Varghese Ring MD;  Location: [facility];  Service: Orthopedics     Social History   Social History     Substance and Sexual Activity   Alcohol Use Not Currently    Comment: quit      Social History     Substance and Sexual Activity   Drug Use Never     Social History     Tobacco Use   Smoking Status Former   • Packs/day: 0.50   • Years: 20.00   • Total pack years: 10.00   • Types: Cigarettes   • Quit date:    • Years since quittin.4   Smokeless Tobacco Never     E-Cigarette/Vaping   • E-Cigarette Use Never User      E-Cigarette/Vaping Substances   • Nicotine No    • THC No    • CBD No    • Flavoring No    • Other No    • Unknown No       Family History: non-contributory    Meds/Allergies   all current active meds have been reviewed  Allergies   Allergen Reactions   • Sulfa Antibiotics Hives       Objective   Vitals: Blood pressure 122/71, pulse 103, temperature 98 °F (36.7 °C), resp. rate 19, weight 118 kg (261 lb 0.4 oz), SpO2 99 %.     Intake/Output Summary (Last 24 hours) at 2023 1308  Last data filed at 2023 1107  Gross per 24 hour   Intake --   Output 7000 ml   Net -7000 ml     Invasive Devices     Peripherally Inserted Central Catheter Line  Duration           PICC Line 43/71/29 Right Basilic 10 days          Peripheral Intravenous Line  Duration           Peripheral IV 06/20/23 Dorsal (posterior); Right Forearm 4 days          Drain  Duration           Urethral Catheter Non-latex 16 Fr. 4 days                Physical Exam  Vitals and nursing note reviewed. Constitutional:       General: He is not in acute distress. Appearance: He is well-developed. HENT:      Head: Normocephalic and atraumatic. Nose: Nose normal.   Eyes:      Conjunctiva/sclera: Conjunctivae normal.   Cardiovascular:      Pulses:           Dorsalis pedis pulses are 1+ on the right side and 1+ on the left side. Posterior tibial pulses are 1+ on the right side and 1+ on the left side. Heart sounds: No murmur heard. Pulmonary:      Effort: Pulmonary effort is normal. No respiratory distress. Breath sounds: Normal breath sounds. Abdominal:      Tenderness: There is no abdominal tenderness. Musculoskeletal:         General: No swelling. Feet:      Right foot:      Skin integrity: Skin integrity normal.      Left foot:      Skin integrity: Callus present. Comments: There is a stable subdermal hematoma to the medial aspect of the left great toe without tenderness palpation; there are no signs of infection; there are no open wounds; mild callus formation noted submet 2 of the left forefoot; there is mild +1 pitting edema of the bilateral lower extremities  Skin:     General: Skin is warm and dry. Capillary Refill: Capillary refill takes 2 to 3 seconds. Neurological:      Mental Status: He is alert. Psychiatric:         Mood and Affect: Mood normal.         Lab Results: I have personally reviewed pertinent reports. Imaging Studies: I have personally reviewed pertinent reports.     EKG, Pathology, and Other Studies: I have personally reviewed pertinent reports.     VTE Prophylaxis: Heparin    Code Status: Level 1 - Full Code

## 2023-06-24 NOTE — PROGRESS NOTES
[address] NOTE   Hassel Lav 69 y.o. male MRN: [id number]  Unit/Bed#: S -43 Encounter: [id number]  Reason for Consult: ABILIO    ASSESSMENT and PLAN:  1. Acute kidney injury (POA)  • Baseline creatinine around 1.0-1.2. • Creatinine was 0.94 at [facility] on 6/19/23. • Admission creatinine 2.29 on 6/20/23  • Work up: CT imaging did not show any hydronephrosis. UA with pyuria but no hematuria or proteinuria. • Initial etiology was felt to be urinary retention vs possible ATN. • However, no improvement with cadena catheter noted and differential dx changed to ATN vs AIN. • Peak creatinine 4.78 on 6/22/23. • Creatinine down to 3.78 today. • ABILIO is resolving. • Clinically behaving like ATN but etiology for ATN is not clear - Daptomycin? • Doubt AIN now. No need for steroids or renal biopsy.      2. Metabolic acidosis:  • Resolved. • Stop sodium bicarbonate pills. 3. Hyperkalemia:  • K 3.9. • Stop Jaswant Mitts. 4. Urinary retention:  • Cadena catheter inserted June 20. • Now on tamsulosin 0.4 mg daily. • Consider voiding trial.      3. Left shoulder prosthetic joint infection  • Was on Daptomycin from previous admission. • ID consulted and now changed to Vancomycin     4. Diabetes. • Per [facility]      5. Hypertension:  • BP is controlled.   • Current meds: none.     DISPOSITION:  · Renal function improving. · Stop oral sodium bicarbonate. · Stop Jaswant Mitts. · Consider voiding trial    The highlighted and/or bolded points in my assessment, plan, and disposition were discussed with the primary team and they agree with those points and the plan. SUBJECTIVE / 24H INTERVAL HISTORY:  Feels good. No CP or SOB  UO 5.6 L.      OBJECTIVE:  Current Weight: Weight - Scale: 118 kg (261 lb 0.4 oz)  Vitals:    06/23/23 1521 06/23/23 2236 06/24/23 0600 06/24/23 0718   BP: 136/65 124/68  122/71   Pulse: (!) 107 (!) 109  103   Resp: 16 16  19   Temp: 97.6 °F (36.4 °C) 99.6 °F (37.6 °C)  98 °F (36.7 °C)   TempSrc:       SpO2: 99% 96%  99%   Weight:   118 kg (261 lb 0.4 oz)        Intake/Output Summary (Last 24 hours) at 6/24/2023 1120  Last data filed at 6/24/2023 1107  Gross per 24 hour   Intake --   Output 7000 ml   Net -7000 ml     General: conscious, cooperative, no distress  Skin: dry  Eyes: pink conjunctivae  ENT: moist mucous membranes  Respiratory: equal chest expansion, clear breath sounds. Cardiovascular: distinct heart sounds, normal rate, regular rhythm, no rub  Abdomen: soft, non tender, non distended, normal bowel sounds  Extremities: mild LE edema. Genitourinary: (+) cadena catheter. Neuro: awake, alert.    Psych: appropriate affect    Medications:    Current Facility-Administered Medications:   •  calamine-zinc oxide lotion, , Topical, 4x Daily, Earl Reina MD  •  diphenhydrAMINE-zinc acetate (BENADRYL) 2-0.1 % cream, , Topical, TID PRN, Ramos Henry MD, Given at 06/23/23 1447  •  heparin (porcine) subcutaneous injection 5,000 Units, 5,000 Units, Subcutaneous, Q8H [address], 5,000 Units at 06/24/23 0516 **AND** [CANCELED] Platelet count, , , Once, Iban Drake PA-C  •  insulin glargine (LANTUS) subcutaneous injection 20 Units 0.2 mL, 20 Units, Subcutaneous, HS, Neli Cardoza MD, 20 Units at 06/23/23 2150  •  insulin lispro (HumaLOG) 100 units/mL subcutaneous injection 1-5 Units, 1-5 Units, Subcutaneous, HS, Lou Valladares PA-C, 2 Units at 06/23/23 2151  •  insulin lispro (HumaLOG) 100 units/mL subcutaneous injection 2-12 Units, 2-12 Units, Subcutaneous, TID AC, 2 Units at 06/24/23 1102 **AND** Fingerstick Glucose (POCT), , , TID AC, Lou Valladares PA-C  •  oxyCODONE (ROXICODONE) immediate release tablet 10 mg, 10 mg, Oral, Q6H PRN, Iban Drake PA-C, 10 mg at 06/23/23 2150  •  tamsulosin (FLOMAX) capsule 0.4 mg, 0.4 mg, Oral, Daily With Hao Magana MD, 0.4 mg at 06/23/23 1640  •  traZODone (DESYREL) tablet 50 mg, 50 mg, Oral, HS PRN, Colleen Gannon PA-C, 50 mg at 06/23/23 2150  •  vancomycin (VANCOCIN) 1500 mg in sodium chloride 0.9% 250 mL IVPB, 15 mg/kg (Adjusted), Intravenous, Daily PRN, Umberto Bronson, [facility]    Laboratory Results:  Results from last 7 days   Lab Units 06/24/23  0451 06/23/23  1653 06/23/23  0542 06/22/23  0459 06/21/23  0451 06/20/23  1811 06/20/23  0339 06/19/23  0458 06/18/23  0517   WBC Thousand/uL 2.56*  --   --   --  3.53*  --  3.91* 3.30* 2.77*   HEMOGLOBIN g/dL 8.0*  --   --   --  8.0*  --  9.0* 9.6* 9.3*   HEMATOCRIT % 25.6*  --   --   --  25.4*  --  28.9* 30.3* 29.3*   PLATELETS Thousands/uL 89*  --   --   --  86*  --  91* 91* 82*   POTASSIUM mmol/L 3.9 4.7 5.9* 5.3 4.4 4.8 4.3 4.4 4.2   CHLORIDE mmol/L 106 104 109* 108 106 103 104 107 107   CO2 mmol/L 28 27 19* 15* 23 23 25 24 23   BUN mg/dL 47* 51* 52* 50* 38* 35* 28* 16 20   CREATININE mg/dL 3.78* 4.09* 4.66* 4.78* 4.07* 3.35* 2.29* 0.94 0.96   CALCIUM mg/dL 8.4 8.6 8.2* 8.0* 8.1* 8.2* 8.7 8.9 8.7   PHOSPHORUS mg/dL 4.7*  --  4.8*  --   --   --   --   --   --

## 2023-06-24 NOTE — ASSESSMENT & PLAN NOTE
· Patient is s/p reverse total shoulder arthoplasty on 4/423 and s/p I&D of left shoulder on 5/2/23 with recent admission at [facility] for left shoulder surgical site infection with abscess and possible prosthetic infection and is now s/p explantation and spacer placement on 6/13/23 with cultures growing cutibacterium avidum and staphylococcus epidermidis. · Discharged from [facility] on 6/19/23 on IV daptomycin 700 mg q24h for 6 weeks (until 7/28/23). · Patient now switched to IV vancomycin from daptomycin because of concern for acute kidney injury.

## 2023-06-24 NOTE — PROGRESS NOTES
[facility]  Progress Note  Name: Chantelle Moreno  MRN: [id number]  Unit/Bed#: S -35 I Date of Admission: 6/20/2023   Date of Service: 6/24/2023 I Hospital Day: 4    Assessment/Plan   * ABILIO (acute kidney injury) [facility])  Assessment & Plan  · Most likely suspected secondary to ATN. · Acute interstitial nephritis was also differential diagnosis. · However discussed with nephrology, and since her creatinine is now getting better so soon possibly ATN more likely. · Etiology for ATN is unclear though. · Patient was on IV daptomycin before which has been switched over to IV vancomycin. Dosing as per pharmacy and ID recommendations. · Creatinine is improving. Continue patient on monitoring BMP. · No urgent need for dialysis. · Continue to hold Dyazide and losartan. · Nephrology following. Postoperative infection of surgical wound  Assessment & Plan  · Patient is s/p reverse total shoulder arthoplasty on 4/423 and s/p I&D of left shoulder on 5/2/23 with recent admission at [facility] for left shoulder surgical site infection with abscess and possible prosthetic infection and is now s/p explantation and spacer placement on 6/13/23 with cultures growing cutibacterium avidum and staphylococcus epidermidis. · Discharged from [facility] on 6/19/23 on IV daptomycin 700 mg q24h for 6 weeks (until 7/28/23). · Patient now switched to IV vancomycin from daptomycin because of concern for acute kidney injury. Bilateral lower extremity edema  Assessment & Plan  · Patient reports worsening b/l lower extremity edema since his recent hospitalization. · RLE edema > LLE on exam which patient reports is typical for his edema. · BNP mildly elevated at 259. · Recent echo on 6/16/23 with EF 60%, G1DD, mild AS. · Not on Lasix as an outpatient; prescribed Dyazide which will be held in the setting of ABILIO. · Venous Doppler negative for DVT  · Elevate extremities.      Insulin-dependent diabetes mellitus  Assessment & Plan  Lab Results   Component Value Date    HGBA1C 8.4 (H) 2023       Recent Labs     23  1555 23  2102 23  0718 23  1101   POCGLU 214* 253* 105 152*       Blood Sugar Average: Last 72 hrs:  (P) 165.2338720303432449   • Hold Amaryl while inpatient. • Monitor accu-checks AC and HS. • Continue Lantus 20 units with sliding scale coverage  • SSI coverage. • Hypoglycemia protocol. Pancytopenia ([address])  Assessment & Plan  · In the setting of cirrhosis  · Blood counts stable             Pharmacologic VTE Prophylaxis: Yes Heparin sc  Mechanical VTE Prophylaxis in Place: No   Patient Centered Rounds: I have performed bedside rounds with the Nursing staff today. Current Length of Stay: 4 day(s)  Current Patient Status: Inpatient   Code Status: Level 1 - Full Code  Time Spent for Care:  35 minutes. More than 50% of total time spent on counseling and coordination of care as described above. Discussions with Specialists or Other Care Team Provider: Yes Nephrology   Education and Discussions with Family / Patient: Yes, Updated family member. Wife over the phone. Discharge Plan: 48 hr - 72 hr   Case Discussed with  regarding updating plan of care and disposition planning. Certification Statement: The patient will continue to require additional inpatient hospital stay due to ATN    Subjective:   I have seen and Examined the patient at the bedside. No CP or Sob. No fevers or chills, No nausea or vomiting. Overnight events reviewed with the RN. No Other complains. Patient complains of left foot wound and possibly due to bumping his foot into one of the doors or wheelchairs. Review of System:   Denies any CP or SOB  Denies any Cough or Cold  Denies any Fevers or chills. Denies any focal tingling numbness or weakness in any extremities. Denies any abdominal pain, Nausea or vomiting.      Objective:   Temp (24hrs), Av.4 °F (36.9 °C), Min:97.6 °F (36.4 °C), Max:99.6 °F (37.6 °C)    Temp:  [97.6 °F (36.4 °C)-99.6 °F (37.6 °C)] 98 °F (36.7 °C)  HR:  [103-109] 103  Resp:  [16-19] 19  BP: (122-136)/(65-71) 122/71  SpO2:  [96 %-99 %] 99 %  Body mass index is 36.41 kg/m². Input and Output Summary (last 24 hours): Intake/Output Summary (Last 24 hours) at 6/24/2023 1252  Last data filed at 6/24/2023 1107  Gross per 24 hour   Intake --   Output 7000 ml   Net -7000 ml     I/O       06/22 0701 06/23 0700 06/23 0701 06/24 0700 06/24 0701 06/25 0700    I.V. (mL/kg)       IV Piggyback       Total Intake(mL/kg)       Urine (mL/kg/hr) 5200 (1.8) 5600 (2) 1400 (2)    Total Output 5200 5600 1400    Net -5200 -5600 -1400                 Physical Exam:   General : Alert, Awake and oriented x 2-3, NAD. Morbid obesity. Neck : Supple. Eyes:  POLA, EOMI. CVS : S1, S2, RRR.   R/S : Clear to auscultate anteriorly. Abd: Soft, NT, ND. Bs+ve  Extremity: Bilateral lower extremity 1-2+ pitting edema and bilateral  chronic venous stasis changes noted. Left shoulder in a sling. Skin exam: Bilateral lower extremity chronic venous stasis changes noted. CNS: No acute FND.  exam: Tejeda catheter draining clear urine. Additional Data:     Labs, Culture & Imaging Data Reviewed:    Results from last 7 days   Lab Units 06/24/23  0451   WBC Thousand/uL 2.56*   HEMOGLOBIN g/dL 8.0*   HEMATOCRIT % 25.6*   PLATELETS Thousands/uL 89*     Results from last 7 days   Lab Units 06/24/23  0451 06/23/23  1653 06/23/23  0542   POTASSIUM mmol/L 3.9   < > 5.9*   CHLORIDE mmol/L 106   < > 109*   CO2 mmol/L 28   < > 19*   BUN mg/dL 47*   < > 52*   CREATININE mg/dL 3.78*   < > 4.66*   CALCIUM mg/dL 8.4   < > 8.2*   ALK PHOS U/L  --   --  167*   ALT U/L  --   --  24   AST U/L  --   --  61*    < > = values in this interval not displayed.          Lab Results   Component Value Date    HGBA1C 8.4 (H) 03/20/2023      VAS lower limb venous duplex study, complete bilateral   Final Result by Brunilda Dick MD (06/20 2779)      CT abdomen pelvis wo contrast   Final Result by Ifrah Chavez MD (06/20 2676)      No hydronephrosis. Cirrhotic liver with portal hypertension. Splenomegaly. Trace ascites. Cholelithiasis. Workstation performed: [id number]         XR chest 1 view portable   ED Interpretation by Kiko Olivera MD (06/20 2361)   No acute cardiopulmonary process noted. Final Result by Shala Musa MD (06/20 9489)      No acute cardiopulmonary disease. Workstation performed: [id number]             Cultures:   Blood Culture:   Lab Results   Component Value Date    BLOODCX No Growth After 5 Days. 09/13/2022    BLOODCX No Growth After 5 Days.  09/13/2022     Urine Culture: No results found for: "URINECX"  Sputum Culture: No components found for: "SPUTUMCX"  Wound Culture:   Lab Results   Component Value Date    WOUNDCULT 1+ Growth of Klebsiella oxytoca (A) 09/14/2022    WOUNDCULT 1+ Growth of 09/14/2022       Last 24 Hours Medication List:   Current Facility-Administered Medications   Medication Dose Route Frequency Provider Last Rate   • calamine-zinc oxide   Topical 4x Daily Marnie Tran MD     • diphenhydrAMINE-zinc acetate   Topical TID PRN Richard Mascorro MD     • heparin (porcine)  5,000 Units Subcutaneous [facility] Mellisa Krause PA-C     • insulin glargine  20 Units Subcutaneous HS Richard Mascorro MD     • insulin lispro  1-5 Units Subcutaneous HS Mellisa Krause PA-C     • insulin lispro  2-12 Units Subcutaneous TID AC Mellisa Krause PA-C     • oxyCODONE  10 mg Oral Q6H PRN Mellisa Krause PA-C     • tamsulosin  0.4 mg Oral Daily With Moses Zamora MD     • traZODone  50 mg Oral HS PRN Mellisa Krause PA-C     • vancomycin  15 mg/kg (Adjusted) Intravenous Daily PRN AMOR Garcia           Patient is at moderate risk for morbidity and mortality due to above mentioned illness and comorbidities.

## 2023-06-24 NOTE — ASSESSMENT & PLAN NOTE
· Most likely suspected secondary to ATN. · Acute interstitial nephritis was also differential diagnosis. · However discussed with nephrology, and since her creatinine is now getting better so soon possibly ATN more likely. · Etiology for ATN is unclear though. · Patient was on IV daptomycin before which has been switched over to IV vancomycin. Dosing as per pharmacy and ID recommendations. · Creatinine is improving. Continue patient on monitoring BMP. · No urgent need for dialysis. · Continue to hold Dyazide and losartan. · Nephrology following.

## 2023-06-24 NOTE — PROGRESS NOTES
Federica Harrison is a 69 y.o. male who is currently ordered Vancomycin IV with management by the Pharmacy Consult service. Relevant clinical data and objective / subjective history reviewed. Vancomycin Assessment:  Indication and Goal AUC/Trough: Bone/joint infection (goal -600, trough >10), -600, trough >10  Clinical Status: stable  Micro:   Per note   Postoperative infection of surgical wound  Assessment & Plan  Patient is s/p reverse total shoulder arthoplasty on  and s/p I&D of left shoulder on 23 with recent admission at [facility] for left shoulder surgical site infection with abscess and possible prosthetic infection and is now s/p explantation and spacer placement on 23 with cultures growing cutibacterium avidum and staphylococcus epidermidis. Discharged from [facility] on 23 on IV daptomycin 700 mg q24h for 6 weeks (until 23). Patient now switched to IV vancomycin from daptomycin because of concern for acute kidney injury. Renal Function:  SCr: 3.78 mg/dL  CrCl: 24.1 mL/min  Renal replacement:not on dialysis  Days of Therapy: 3  Current Dose: 1500 mg IV prn when level </= 15  Vancomycin Plan:  New Dosin mg IV prn when level </= 15    Next Level:  at 2100  Renal Function Monitoring: Daily BMP and [place] will continue to follow closely for s/sx of nephrotoxicity, infusion reactions and appropriateness of therapy. BMP and CBC will be ordered per protocol. We will continue to follow the patient’s culture results and clinical progress daily.     Mic Nova, Pharmacist

## 2023-06-24 NOTE — ASSESSMENT & PLAN NOTE
Lab Results   Component Value Date    HGBA1C 8.4 (H) 03/20/2023       Recent Labs     06/23/23  1555 06/23/23  2102 06/24/23  0718 06/24/23  1101   POCGLU 214* 253* 105 152*       Blood Sugar Average: Last 72 hrs:  (P) 165.2885314130194375   • Hold Amaryl while inpatient. • Monitor accu-checks AC and HS. • Continue Lantus 20 units with sliding scale coverage  • SSI coverage. • Hypoglycemia protocol.

## 2023-06-25 LAB
ANION GAP SERPL CALCULATED.3IONS-SCNC: 6 MMOL/L
BACTERIA SPEC ANAEROBE CULT: ABNORMAL
BACTERIA SPEC ANAEROBE CULT: NO GROWTH
BACTERIA SPEC ANAEROBE CULT: NO GROWTH
BACTERIA TISS AEROBE CULT: NO GROWTH
BUN SERPL-MCNC: 39 MG/DL (ref 5–25)
CALCIUM SERPL-MCNC: 8.5 MG/DL (ref 8.4–10.2)
CHLORIDE SERPL-SCNC: 107 MMOL/L (ref 96–108)
CO2 SERPL-SCNC: 28 MMOL/L (ref 21–32)
CREAT SERPL-MCNC: 2.6 MG/DL (ref 0.6–1.3)
GFR SERPL CREATININE-BSD FRML MDRD: 24 ML/MIN/1.73SQ M
GLUCOSE SERPL-MCNC: 100 MG/DL (ref 65–140)
GLUCOSE SERPL-MCNC: 183 MG/DL (ref 65–140)
GLUCOSE SERPL-MCNC: 238 MG/DL (ref 65–140)
GLUCOSE SERPL-MCNC: 254 MG/DL (ref 65–140)
GLUCOSE SERPL-MCNC: 80 MG/DL (ref 65–140)
GRAM STN SPEC: NORMAL
POTASSIUM SERPL-SCNC: 3.8 MMOL/L (ref 3.5–5.3)
SODIUM SERPL-SCNC: 141 MMOL/L (ref 135–147)
VANCOMYCIN SERPL-MCNC: 15.7 UG/ML (ref 10–20)

## 2023-06-25 PROCEDURE — 80048 BASIC METABOLIC PNL TOTAL CA: CPT | Performed by: INTERNAL MEDICINE

## 2023-06-25 PROCEDURE — 82948 REAGENT STRIP/BLOOD GLUCOSE: CPT

## 2023-06-25 PROCEDURE — 99232 SBSQ HOSP IP/OBS MODERATE 35: CPT | Performed by: INTERNAL MEDICINE

## 2023-06-25 PROCEDURE — 99233 SBSQ HOSP IP/OBS HIGH 50: CPT | Performed by: INTERNAL MEDICINE

## 2023-06-25 PROCEDURE — 80202 ASSAY OF VANCOMYCIN: CPT | Performed by: INTERNAL MEDICINE

## 2023-06-25 RX ADMIN — OXYCODONE HYDROCHLORIDE 10 MG: 10 TABLET ORAL at 21:32

## 2023-06-25 RX ADMIN — INSULIN LISPRO 6 UNITS: 100 INJECTION, SOLUTION INTRAVENOUS; SUBCUTANEOUS at 15:33

## 2023-06-25 RX ADMIN — HEPARIN SODIUM 5000 UNITS: 5000 INJECTION INTRAVENOUS; SUBCUTANEOUS at 21:18

## 2023-06-25 RX ADMIN — TRAZODONE HYDROCHLORIDE 50 MG: 50 TABLET ORAL at 21:32

## 2023-06-25 RX ADMIN — HEPARIN SODIUM 5000 UNITS: 5000 INJECTION INTRAVENOUS; SUBCUTANEOUS at 05:52

## 2023-06-25 RX ADMIN — HEPARIN SODIUM 5000 UNITS: 5000 INJECTION INTRAVENOUS; SUBCUTANEOUS at 13:28

## 2023-06-25 RX ADMIN — VANCOMYCIN HYDROCHLORIDE 1250 MG: 10 INJECTION, POWDER, LYOPHILIZED, FOR SOLUTION INTRAVENOUS at 07:13

## 2023-06-25 RX ADMIN — INSULIN LISPRO 2 UNITS: 100 INJECTION, SOLUTION INTRAVENOUS; SUBCUTANEOUS at 11:35

## 2023-06-25 RX ADMIN — INSULIN GLARGINE 20 UNITS: 100 INJECTION, SOLUTION SUBCUTANEOUS at 21:17

## 2023-06-25 RX ADMIN — INSULIN LISPRO 2 UNITS: 100 INJECTION, SOLUTION INTRAVENOUS; SUBCUTANEOUS at 21:19

## 2023-06-25 RX ADMIN — TAMSULOSIN HYDROCHLORIDE 0.4 MG: 0.4 CAPSULE ORAL at 15:33

## 2023-06-25 NOTE — ASSESSMENT & PLAN NOTE
· Patient is s/p reverse total shoulder arthoplasty on 4/423 and s/p I&D of left shoulder on 5/2/23 with recent admission at [facility] for left shoulder surgical site infection with abscess and possible prosthetic infection and is now s/p explantation and spacer placement on 6/13/23 with cultures growing cutibacterium avidum and staphylococcus epidermidis. · Discharged from [facility] on 6/19/23 on IV daptomycin 700 mg q24h for 6 weeks (until 7/28/23). · Patient now switched to IV vancomycin from daptomycin because of concern for acute kidney injury.

## 2023-06-25 NOTE — ASSESSMENT & PLAN NOTE
Lab Results   Component Value Date    HGBA1C 8.4 (H) 03/20/2023       Recent Labs     06/24/23  1541 06/24/23  2054 06/25/23  0717 06/25/23  1103   POCGLU 178* 225* 100 183*       Blood Sugar Average: Last 72 hrs:  (P) 175.5   • Hold Amaryl while inpatient. • Monitor accu-checks AC and HS. • Continue Lantus 20 units with sliding scale coverage  • SSI coverage. • Hypoglycemia protocol.

## 2023-06-25 NOTE — ASSESSMENT & PLAN NOTE
· Most likely suspected secondary to ATN. · Acute interstitial nephritis was also differential diagnosis. · However discussed with nephrology, and since her creatinine is now getting better so soon possibly ATN more likely. · Etiology for ATN is unclear though. · Patient was on IV daptomycin before which has been switched over to IV vancomycin. Dosing as per pharmacy and ID recommendations. · Creatinine is improving. Continue patient on monitoring BMP. Lab Results   Component Value Date    CREATININE 2.60 (H) 06/25/2023   ·   Continue to hold Dyazide and losartan. · Nephrology following. · Discontinue cadena catheter.

## 2023-06-25 NOTE — PLAN OF CARE
Problem: PAIN - ADULT  Goal: Verbalizes/displays adequate comfort level or baseline comfort level  Description: Interventions:  - Encourage patient to monitor pain and request assistance  - Assess pain using appropriate pain scale  - Administer analgesics based on type and severity of pain and evaluate response  - Implement non-pharmacological measures as appropriate and evaluate response  - Consider cultural and social influences on pain and pain management  - Notify physician/advanced practitioner if interventions unsuccessful or patient reports new pain  Outcome: Progressing     Problem: INFECTION - ADULT  Goal: Absence or prevention of progression during hospitalization  Description: INTERVENTIONS:  - Assess and monitor for signs and symptoms of infection  - Monitor lab/diagnostic results  - Monitor all insertion sites, i.e. indwelling lines, tubes, and drains  - Monitor endotracheal if appropriate and nasal secretions for changes in amount and color  - [place] appropriate cooling/warming therapies per order  - Administer medications as ordered  - Instruct and encourage patient and family to use good hand hygiene technique  - Identify and instruct in appropriate isolation precautions for identified infection/condition  Outcome: Progressing     Problem: SAFETY ADULT  Goal: Patient will remain free of falls  Description: INTERVENTIONS:  - Educate patient/family on patient safety including physical limitations  - Instruct patient to call for assistance with activity   - Consult OT/PT to assist with strengthening/mobility   - Keep Call bell within reach  - Keep bed low and locked with side rails adjusted as appropriate  - Keep care items and personal belongings within reach  - Initiate and maintain comfort rounds  - Make Fall Risk Sign visible to staff  - Offer Toileting every  Hours, in advance of need  - Initiate/Maintain alarm  - Obtain necessary fall risk management equipment:   - Apply yellow socks and bracelet for high fall risk patients  - Consider moving patient to room near nurses station  Outcome: Progressing  Goal: Maintain or return to baseline ADL function  Description: INTERVENTIONS:  -  Assess patient's ability to carry out ADLs; assess patient's baseline for ADL function and identify physical deficits which impact ability to perform ADLs (bathing, care of mouth/teeth, toileting, grooming, dressing, etc.)  - Assess/evaluate cause of self-care deficits   - Assess range of motion  - Assess patient's mobility; develop plan if impaired  - Assess patient's need for assistive devices and provide as appropriate  - Encourage maximum independence but intervene and supervise when necessary  - Involve family in performance of ADLs  - Assess for home care needs following discharge   - Consider OT consult to assist with ADL evaluation and planning for discharge  - Provide patient education as appropriate  Outcome: Progressing  Goal: Maintains/Returns to pre admission functional level  Description: INTERVENTIONS:  - Perform BMAT or MOVE assessment daily.   - Set and communicate daily mobility goal to care team and patient/family/caregiver. - Collaborate with rehabilitation services on mobility goals if consulted  - Perform Range of Motion  times a day. - Reposition patient every  hours.   - Dangle patient  times a day  - Stand patient  times a day  - Ambulate patient  times a day  - Out of bed to chair  times a day   - Out of bed for meals  times a day  - Out of bed for toileting  - Record patient progress and toleration of activity level   Outcome: Progressing     Problem: DISCHARGE PLANNING  Goal: Discharge to home or other facility with appropriate resources  Description: INTERVENTIONS:  - Identify barriers to discharge w/patient and caregiver  - Arrange for needed discharge resources and transportation as appropriate  - Identify discharge learning needs (meds, wound care, etc.)  - Arrange for interpretive services to assist at discharge as needed  - Refer to Case Management Department for coordinating discharge planning if the patient needs post-hospital services based on physician/advanced practitioner order or complex needs related to functional status, cognitive ability, or social support system  Outcome: Progressing     Problem: GENITOURINARY - ADULT  Goal: Maintains or returns to baseline urinary function  Description: INTERVENTIONS:  - Assess urinary function  - Encourage oral fluids to ensure adequate hydration if ordered  - Administer IV fluids as ordered to ensure adequate hydration  - Administer ordered medications as needed  - Offer frequent toileting  - Follow urinary retention protocol if ordered  Outcome: Progressing  Goal: Absence of urinary retention  Description: INTERVENTIONS:  - Assess patient’s ability to void and empty bladder  - Monitor I/O  - Bladder scan as needed  - Discuss with physician/AP medications to alleviate retention as needed  - Discuss catheterization for long term situations as appropriate  Outcome: Progressing  Goal: Urinary catheter remains patent  Description: INTERVENTIONS:  - Assess patency of urinary catheter  - If patient has a chronic cadena, consider changing catheter if non-functioning  - Follow guidelines for intermittent irrigation of non-functioning urinary catheter  Outcome: Progressing     Problem: Knowledge Deficit  Goal: Patient/family/caregiver demonstrates understanding of disease process, treatment plan, medications, and discharge instructions  Description: Complete learning assessment and assess knowledge base.   Interventions:  - Provide teaching at level of understanding  - Provide teaching via preferred learning methods  Outcome: Progressing     Problem: MOBILITY - ADULT  Goal: Maintain or return to baseline ADL function  Description: INTERVENTIONS:  -  Assess patient's ability to carry out ADLs; assess patient's baseline for ADL function and identify physical deficits which impact ability to perform ADLs (bathing, care of mouth/teeth, toileting, grooming, dressing, etc.)  - Assess/evaluate cause of self-care deficits   - Assess range of motion  - Assess patient's mobility; develop plan if impaired  - Assess patient's need for assistive devices and provide as appropriate  - Encourage maximum independence but intervene and supervise when necessary  - Involve family in performance of ADLs  - Assess for home care needs following discharge   - Consider OT consult to assist with ADL evaluation and planning for discharge  - Provide patient education as appropriate  Outcome: Progressing  Goal: Maintains/Returns to pre admission functional level  Description: INTERVENTIONS:  - Perform BMAT or MOVE assessment daily.   - Set and communicate daily mobility goal to care team and patient/family/caregiver. - Collaborate with rehabilitation services on mobility goals if consulted  - Perform Range of Motion  times a day. - Reposition patient every hours.   - Dangle patient  times a day  - Stand patient  times a day  - Ambulate patient  times a day  - Out of bed to chair  times a day   - Out of bed for meals  times a day  - Out of bed for toileting  - Record patient progress and toleration of activity level   Outcome: Progressing

## 2023-06-25 NOTE — PROGRESS NOTES
[address] [facility]  Progress Note  Name: Samia Catherine  MRN: [id number]  Unit/Bed#: S -59 I Date of Admission: 6/20/2023   Date of Service: 6/25/2023 I Hospital Day: 5    Assessment/Plan   * ABILIO (acute kidney injury) [facility])  Assessment & Plan  · Most likely suspected secondary to ATN. · Acute interstitial nephritis was also differential diagnosis. · However discussed with nephrology, and since her creatinine is now getting better so soon possibly ATN more likely. · Etiology for ATN is unclear though. · Patient was on IV daptomycin before which has been switched over to IV vancomycin. Dosing as per pharmacy and ID recommendations. · Creatinine is improving. Continue patient on monitoring BMP. Lab Results   Component Value Date    CREATININE 2.60 (H) 06/25/2023   ·   Continue to hold Dyazide and losartan. · Nephrology following. · Discontinue cadena catheter. Postoperative infection of surgical wound  Assessment & Plan  · Patient is s/p reverse total shoulder arthoplasty on 4/423 and s/p I&D of left shoulder on 5/2/23 with recent admission at [facility] for left shoulder surgical site infection with abscess and possible prosthetic infection and is now s/p explantation and spacer placement on 6/13/23 with cultures growing cutibacterium avidum and staphylococcus epidermidis. · Discharged from [facility] on 6/19/23 on IV daptomycin 700 mg q24h for 6 weeks (until 7/28/23). · Patient now switched to IV vancomycin from daptomycin because of concern for acute kidney injury. Bilateral lower extremity edema  Assessment & Plan  · Patient reports worsening b/l lower extremity edema since his recent hospitalization. · RLE edema > LLE on exam which patient reports is typical for his edema. · BNP mildly elevated at 259. · Recent echo on 6/16/23 with EF 60%, G1DD, mild AS. · Not on Lasix as an outpatient; prescribed Dyazide which will be held in the setting of ABILIO.    · Venous Doppler negative for DVT  · Elevate extremities. Insulin-dependent diabetes mellitus  Assessment & Plan  Lab Results   Component Value Date    HGBA1C 8.4 (H) 03/20/2023       Recent Labs     06/24/23  1541 06/24/23  2054 06/25/23  0717 06/25/23  1103   POCGLU 178* 225* 100 183*       Blood Sugar Average: Last 72 hrs:  (P) 175.5   • Hold Amaryl while inpatient. • Monitor accu-checks AC and HS. • Continue Lantus 20 units with sliding scale coverage  • SSI coverage. • Hypoglycemia protocol. Ulcer of left foot, limited to breakdown of skin [facility])  Assessment & Plan  Consulted podiatry for the same. Cont. Local wound care. Pancytopenia ([address])  Assessment & Plan  · In the setting of cirrhosis  · Blood counts stable             Pharmacologic VTE Prophylaxis: Yes   Mechanical VTE Prophylaxis in Place: No   Patient Centered Rounds: I have performed bedside rounds with the Nursing staff today. Current Length of Stay: 5 day(s)  Current Patient Status: Inpatient   Code Status: Level 1 - Full Code  Time Spent for Care:  35 minutes. More than 50% of total time spent on counseling and coordination of care as described above. Discussions with Specialists or Other Care Team Provider: Yes Nephrology  Education and Discussions with Family / Patient: Yes, Updated family member. Wife at bedside. Discharge Plan: 48 hrs  Case Discussed with  regarding updating plan of care and disposition planning. Certification Statement: The patient will continue to require additional inpatient hospital stay due to ATN    Subjective:   I have seen and Examined the patient at the bedside. No CP or Sob. No fevers or chills, No nausea or vomiting. Overnight events reviewed with the RN. No Other complains. Review of System:   Denies any CP or SOB  Denies any Cough or Cold  Denies any Fevers or chills. Denies any focal tingling numbness or weakness in any extremities. Denies any abdominal pain, Nausea or vomiting. Objective:   Temp (24hrs), Av.6 °F (37 °C), Min:97.9 °F (36.6 °C), Max:99.4 °F (37.4 °C)    Temp:  [97.9 °F (36.6 °C)-99.4 °F (37.4 °C)] 97.9 °F (36.6 °C)  HR:  [] 102  Resp:  [12-16] 12  BP: (129-142)/(59-73) 134/59  SpO2:  [98 %-99 %] 99 %  Body mass index is 36.19 kg/m². Input and Output Summary (last 24 hours): Intake/Output Summary (Last 24 hours) at 2023 1430  Last data filed at 2023 1325  Gross per 24 hour   Intake --   Output 4150 ml   Net -4150 ml     I/O        0701   0700  0701   0700  0701   0700    Urine (mL/kg/hr) 5600 (2) 5050 (1.8) 500 (0.6)    Total Output 5600 5050 500    Net -5600 -5050 -500                 Physical Exam:   General : Alert, Awake and oriented x 2-3, NAD. Morbid obesity. Neck : Supple. Eyes:  POLA, EOMI. CVS : S1, S2, RRR.   R/S : Clear to auscultate anteriorly. Abd: Soft, NT, ND. Bs+ve  Extremity: Bilateral lower extremity 1-2+ pitting edema and bilateral  chronic venous stasis changes noted. Left shoulder in a sling. Skin exam: Bilateral lower extremity chronic venous stasis changes noted. CNS: No acute FND.  exam: Tejeda catheter draining clear urine. Additional Data:     Labs, Culture & Imaging Data Reviewed:    Results from last 7 days   Lab Units 23  0451   WBC Thousand/uL 2.56*   HEMOGLOBIN g/dL 8.0*   HEMATOCRIT % 25.6*   PLATELETS Thousands/uL 89*     Results from last 7 days   Lab Units 23  0554 23  1653 23  0542   POTASSIUM mmol/L 3.8   < > 5.9*   CHLORIDE mmol/L 107   < > 109*   CO2 mmol/L 28   < > 19*   BUN mg/dL 39*   < > 52*   CREATININE mg/dL 2.60*   < > 4.66*   CALCIUM mg/dL 8.5   < > 8.2*   ALK PHOS U/L  --   --  167*   ALT U/L  --   --  24   AST U/L  --   --  61*    < > = values in this interval not displayed.          Lab Results   Component Value Date    HGBA1C 8.4 (H) 2023      VAS lower limb venous duplex study, complete bilateral   Final Result by Roxy Domínguez Mendy May MD (06/20 5389)      CT abdomen pelvis wo contrast   Final Result by Catherine Limon MD (06/20 1436)      No hydronephrosis. Cirrhotic liver with portal hypertension. Splenomegaly. Trace ascites. Cholelithiasis. Workstation performed: [id number]         XR chest 1 view portable   ED Interpretation by Sarah Mckay MD (06/20 9985)   No acute cardiopulmonary process noted. Final Result by Colleen Denson MD (06/20 0312)      No acute cardiopulmonary disease. Workstation performed: [id number]             Cultures:   Blood Culture:   Lab Results   Component Value Date    BLOODCX No Growth After 5 Days. 09/13/2022    BLOODCX No Growth After 5 Days.  09/13/2022     Urine Culture: No results found for: "URINECX"  Sputum Culture: No components found for: "SPUTUMCX"  Wound Culture:   Lab Results   Component Value Date    WOUNDCULT 1+ Growth of Klebsiella oxytoca (A) 09/14/2022    WOUNDCULT 1+ Growth of 09/14/2022       Last 24 Hours Medication List:   Current Facility-Administered Medications   Medication Dose Route Frequency Provider Last Rate   • calamine-zinc oxide   Topical 4x Daily Edelmira Muir MD     • diphenhydrAMINE-zinc acetate   Topical TID PRN Jaylan Ordoñez MD     • heparin (porcine)  5,000 Units Subcutaneous [facility] Melani Irvin PA-C     • insulin glargine  20 Units Subcutaneous HS Jaylan Ordoñez MD     • insulin lispro  1-5 Units Subcutaneous HS Melani Irvin PA-C     • insulin lispro  2-12 Units Subcutaneous TID AC Melani Irvin PA-C     • oxyCODONE  10 mg Oral Q6H PRN Melani Irvin PA-C     • tamsulosin  0.4 mg Oral Daily With Venu Aragon MD     • traZODone  50 mg Oral HS PRN Melani Irvin PA-C     • vancomycin  15 mg/kg (Adjusted) Intravenous Daily PRN AMOR Monroe           Patient is at moderate risk for morbidity and mortality due to above mentioned illness and comorbidities.

## 2023-06-25 NOTE — PROGRESS NOTES
[address] [facility] NOTE   Michelle Stewart 69 y.o. male MRN: [id number]  Unit/Bed#: S -96 Encounter: [id number]  Reason for Consult: ABILIO    ASSESSMENT and PLAN:  1. Acute kidney injury (POA)  • Baseline creatinine around 1.0-1.2. • Creatinine was 0.94 at [facility] on 6/19/23. • Admission creatinine 2.29 on 6/20/23  • Work up: CT imaging did not show any hydronephrosis. UA with pyuria but no hematuria or proteinuria. • Initial etiology was felt to be urinary retention vs possible ATN. • However, no improvement with cadena catheter noted and differential dx changed to ATN vs AIN. • Peak creatinine 4.78 on 6/22/23. • Renal function has been improving over the past 3 days. Creatinine down to 2.60 today. • Clinically behaving like ATN but etiology for ATN is not clear - Daptomycin? • At this point, I highly doubt AIN. I do not think a renal biopsy is necessary at this time.     2. Urinary retention:  • Cadena catheter inserted June 20 on admission. • Continue tamsulosin 0.4 mg daily. • Consider voiding trial today. 3. Left shoulder prostatic joint infection  • Was on Daptomycin from previous admission. • ID consulted and now changed to Vancomycin    4. Hypertension:  · BP controlled. · Currently on no BP meds. · He was on diltiazem 180 mg daily, losartan 100 mg daily, and Dyazide daily. · Would keep off losartan on discharge. 5. Diabetes mellitus: per SLIM. DISPOSITION:  · Improving renal function. · Consider voiding trial today. · If renal function continues to improve over the next 24 to 48 hours, may consider discharge with outpatient renal follow-up. · Avoid losartan on discharge. The highlighted and/or bolded points in my assessment, plan, and disposition were discussed with the primary team and they agree with those points and the plan. SUBJECTIVE / 24H INTERVAL HISTORY:  No new complaints. Denies CP or SOB. UO 5050 cc.      OBJECTIVE:  Current Weight: Weight - Scale: 118 kg (259 lb 8 oz)  Vitals:    06/24/23 1541 06/24/23 2135 06/25/23 0600 06/25/23 0731   BP: 142/73 129/65  134/59   Pulse: 93 95  102   Resp: 16 16  12   Temp: 98.5 °F (36.9 °C) 99.4 °F (37.4 °C)  97.9 °F (36.6 °C)   TempSrc:       SpO2: 98% 98%  99%   Weight:   118 kg (259 lb 8 oz)        Intake/Output Summary (Last 24 hours) at 6/25/2023 1131  Last data filed at 6/25/2023 0551  Gross per 24 hour   Intake --   Output 3650 ml   Net -3650 ml     General: conscious, cooperative, no distress  Skin: dry  Eyes: pink conjunctivae  ENT: moist mucous membranes  Respiratory: equal chest expansion, clear breath sounds. Cardiovascular: distinct heart sounds, normal rate, regular rhythm, no rub  Abdomen: soft, non tender, non distended, normal bowel sounds  Extremities: mild LE edema. Genitourinary: + cadena catheter. Neuro: awake, alert.    Psych: appropriate affect    Medications:    Current Facility-Administered Medications:   •  calamine-zinc oxide lotion, , Topical, 4x Daily, Edelmira Muir MD  •  diphenhydrAMINE-zinc acetate (BENADRYL) 2-0.1 % cream, , Topical, TID PRN, Jaylan Ordoñez MD, Given at 06/23/23 1447  •  heparin (porcine) subcutaneous injection 5,000 Units, 5,000 Units, Subcutaneous, Q8H [address], 5,000 Units at 06/25/23 0552 **AND** [CANCELED] Platelet count, , , Once, Melani Irvin PA-C  •  insulin glargine (LANTUS) subcutaneous injection 20 Units 0.2 mL, 20 Units, Subcutaneous, HS, Neli Osuna MD, 20 Units at 06/24/23 2229  •  insulin lispro (HumaLOG) 100 units/mL subcutaneous injection 1-5 Units, 1-5 Units, Subcutaneous, HS, Lou Lane PA-C, 2 Units at 06/24/23 2230  •  insulin lispro (HumaLOG) 100 units/mL subcutaneous injection 2-12 Units, 2-12 Units, Subcutaneous, TID AC, 2 Units at 06/24/23 1615 **AND** Fingerstick Glucose (POCT), , , TID AC, Lou Lane PA-C  •  oxyCODONE (ROXICODONE) immediate release tablet 10 mg, 10 mg, Oral, Q6H PRN, Debra Soliman ABELARDO Tristan, 10 mg at 06/24/23 2228  •  tamsulosin (FLOMAX) capsule 0.4 mg, 0.4 mg, Oral, Daily With Dinner, Feliciano Gomez MD, 0.4 mg at 06/24/23 1628  •  traZODone (DESYREL) tablet 50 mg, 50 mg, Oral, HS PRN, Colleen Gannon PA-C, 50 mg at 06/24/23 2228  •  vancomycin (VANCOCIN) 1500 mg in sodium chloride 0.9% 250 mL IVPB, 15 mg/kg (Adjusted), Intravenous, Daily PRN, AMOR Anderson    Laboratory Results:  Results from last 7 days   Lab Units 06/25/23  0554 06/24/23  0451 06/23/23  1653 06/23/23  0542 06/22/23  0459 06/21/23  0451 06/20/23  1811 06/20/23  0339 06/19/23  0458   WBC Thousand/uL  --  2.56*  --   --   --  3.53*  --  3.91* 3.30*   HEMOGLOBIN g/dL  --  8.0*  --   --   --  8.0*  --  9.0* 9.6*   HEMATOCRIT %  --  25.6*  --   --   --  25.4*  --  28.9* 30.3*   PLATELETS Thousands/uL  --  89*  --   --   --  86*  --  91* 91*   POTASSIUM mmol/L 3.8 3.9 4.7 5.9* 5.3 4.4 4.8 4.3 4.4   CHLORIDE mmol/L 107 106 104 109* 108 106 103 104 107   CO2 mmol/L 28 28 27 19* 15* 23 23 25 24   BUN mg/dL 39* 47* 51* 52* 50* 38* 35* 28* 16   CREATININE mg/dL 2.60* 3.78* 4.09* 4.66* 4.78* 4.07* 3.35* 2.29* 0.94   CALCIUM mg/dL 8.5 8.4 8.6 8.2* 8.0* 8.1* 8.2* 8.7 8.9   PHOSPHORUS mg/dL  --  4.7*  --  4.8*  --   --   --   --   --

## 2023-06-26 ENCOUNTER — APPOINTMENT (INPATIENT)
Dept: RADIOLOGY | Facility: HOSPITAL | Age: 68
DRG: 862 | End: 2023-06-26
Payer: MEDICARE

## 2023-06-26 LAB
ANION GAP SERPL CALCULATED.3IONS-SCNC: 5 MMOL/L
BUN SERPL-MCNC: 33 MG/DL (ref 5–25)
CALCIUM SERPL-MCNC: 8.2 MG/DL (ref 8.4–10.2)
CHLORIDE SERPL-SCNC: 108 MMOL/L (ref 96–108)
CO2 SERPL-SCNC: 28 MMOL/L (ref 21–32)
CREAT SERPL-MCNC: 2.23 MG/DL (ref 0.6–1.3)
GFR SERPL CREATININE-BSD FRML MDRD: 28 ML/MIN/1.73SQ M
GLUCOSE SERPL-MCNC: 115 MG/DL (ref 65–140)
GLUCOSE SERPL-MCNC: 126 MG/DL (ref 65–140)
GLUCOSE SERPL-MCNC: 194 MG/DL (ref 65–140)
GLUCOSE SERPL-MCNC: 203 MG/DL (ref 65–140)
GLUCOSE SERPL-MCNC: 204 MG/DL (ref 65–140)
POTASSIUM SERPL-SCNC: 3.6 MMOL/L (ref 3.5–5.3)
SODIUM SERPL-SCNC: 141 MMOL/L (ref 135–147)
VANCOMYCIN TROUGH SERPL-MCNC: 12 UG/ML (ref 10–20)

## 2023-06-26 PROCEDURE — 99233 SBSQ HOSP IP/OBS HIGH 50: CPT | Performed by: INTERNAL MEDICINE

## 2023-06-26 PROCEDURE — 99232 SBSQ HOSP IP/OBS MODERATE 35: CPT | Performed by: INTERNAL MEDICINE

## 2023-06-26 PROCEDURE — 73030 X-RAY EXAM OF SHOULDER: CPT

## 2023-06-26 PROCEDURE — 80048 BASIC METABOLIC PNL TOTAL CA: CPT | Performed by: INTERNAL MEDICINE

## 2023-06-26 PROCEDURE — 80202 ASSAY OF VANCOMYCIN: CPT | Performed by: INTERNAL MEDICINE

## 2023-06-26 PROCEDURE — 82948 REAGENT STRIP/BLOOD GLUCOSE: CPT

## 2023-06-26 PROCEDURE — 99024 POSTOP FOLLOW-UP VISIT: CPT | Performed by: PHYSICIAN ASSISTANT

## 2023-06-26 RX ORDER — FLUCONAZOLE 100 MG/1
200 TABLET ORAL DAILY
Status: DISCONTINUED | OUTPATIENT
Start: 2023-06-26 | End: 2023-06-26

## 2023-06-26 RX ORDER — CLOTRIMAZOLE 1 %
CREAM (GRAM) TOPICAL 2 TIMES DAILY
Status: DISCONTINUED | OUTPATIENT
Start: 2023-06-26 | End: 2023-06-26

## 2023-06-26 RX ORDER — FLUCONAZOLE 100 MG/1
200 TABLET ORAL EVERY 24 HOURS
Status: DISCONTINUED | OUTPATIENT
Start: 2023-06-26 | End: 2023-06-27

## 2023-06-26 RX ORDER — GLIMEPIRIDE 2 MG/1
2 TABLET ORAL
Status: DISCONTINUED | OUTPATIENT
Start: 2023-06-27 | End: 2023-06-28 | Stop reason: HOSPADM

## 2023-06-26 RX ADMIN — FLUCONAZOLE 200 MG: 100 TABLET ORAL at 17:57

## 2023-06-26 RX ADMIN — HEPARIN SODIUM 5000 UNITS: 5000 INJECTION INTRAVENOUS; SUBCUTANEOUS at 06:03

## 2023-06-26 RX ADMIN — HEPARIN SODIUM 5000 UNITS: 5000 INJECTION INTRAVENOUS; SUBCUTANEOUS at 14:54

## 2023-06-26 RX ADMIN — INSULIN GLARGINE 20 UNITS: 100 INJECTION, SOLUTION SUBCUTANEOUS at 22:55

## 2023-06-26 RX ADMIN — HEPARIN SODIUM 5000 UNITS: 5000 INJECTION INTRAVENOUS; SUBCUTANEOUS at 22:55

## 2023-06-26 RX ADMIN — TAMSULOSIN HYDROCHLORIDE 0.4 MG: 0.4 CAPSULE ORAL at 17:57

## 2023-06-26 RX ADMIN — INSULIN LISPRO 2 UNITS: 100 INJECTION, SOLUTION INTRAVENOUS; SUBCUTANEOUS at 11:34

## 2023-06-26 RX ADMIN — VANCOMYCIN HYDROCHLORIDE 1500 MG: 10 INJECTION, POWDER, LYOPHILIZED, FOR SOLUTION INTRAVENOUS at 10:24

## 2023-06-26 RX ADMIN — INSULIN LISPRO 1 UNITS: 100 INJECTION, SOLUTION INTRAVENOUS; SUBCUTANEOUS at 22:55

## 2023-06-26 RX ADMIN — INSULIN LISPRO 4 UNITS: 100 INJECTION, SOLUTION INTRAVENOUS; SUBCUTANEOUS at 17:57

## 2023-06-26 RX ADMIN — TRAZODONE HYDROCHLORIDE 50 MG: 50 TABLET ORAL at 22:54

## 2023-06-26 RX ADMIN — OXYCODONE HYDROCHLORIDE 10 MG: 10 TABLET ORAL at 22:54

## 2023-06-26 NOTE — PROGRESS NOTES
Progress Note - Nephrology   Tru Omer 69 y.o. male MRN: [id number]  Unit/Bed#: S -01 Encounter: [id number]     49-year-old male with past medical history of hypertension, type 2 diabetes, alcoholic liver cirrhosis, recently discharged from [address] [place] [address] on 6/19 after he have had left shoulder prostatic infection now status post left shoulder resection and revision/arthroplasty and insertion of antibiotic spacer 6/13. Patient was discharged on daptomycin. Creatinine on discharge on 6/19 was 0.9. Patient presented to [facility] the night after he was discharged because of decreased urine output. Creatinine was noted to be 2.29. Patient was suspected to have ABLIIO secondary to acute urinary retention, Tejeda was placed on presentation however his creatinine increased since. CT abdomen pelvis on presentation showed no hydronephrosis or calculi, mild bilateral perinephric stranding that can be seen in the setting of renal insufficiency, enlarged prostate, urinary bladder decompressed by Tejeda catheter. Also UA was unremarkable. Assessment:  1. Acute on chronic kidney injury  • Baseline creatinine is 0.9-1.2. • Creatinine on admission 2.29, peaked to 4.78 on 6/22/23. • Initially etiology was felt to be urinary retention however creatinine did not improve with Tejeda catheter. Most likely ATN but unclear etiology for it. Suspicion for daptomycin reaction. Daptomycin was discontinued and patient was started on vancomycin instead. • Patient's creatinine has been improving , it decreased to 2.2 this morning. • Patient currently in the polyuric phase, had 3.7 L urine output /24 h     2. Hypertension   · Blood pressure controlled. · Patient's blood pressure meds held since admission  · Was on diltiazem 180 mg daily, losartan 100 and Dyazide daily    3.   Left shoulder prosthetic joint infection  · Currently on IV vancomycin through 7/28  · Managed per ID     4.  Diabetes mellitus type 2- per [facility] 5. Urine retention   · Tejeda was removed yesterday   · Urinating well     Plan:  · Improving renal function  · Patient can be discharged home from a nephrology standpoint and follow-up outpatient  · Avoid losartan on discharge    Will discuss with my attending about final plan. Subjective:   Patient resting in recliner in no distress. Feels good, but has concern regarding his shoulder infection. Objective:     Vitals: Blood pressure 134/65, pulse 89, temperature 98.5 °F (36.9 °C), resp. rate 17, weight 118 kg (260 lb 2.3 oz), SpO2 98 %. ,Body mass index is 36.28 kg/m². Weight (last 2 days)     Date/Time Weight    06/26/23 0600 118 (260.14)    06/25/23 0600 118 (259.5)    06/24/23 0600 118 (261.02)            Intake/Output Summary (Last 24 hours) at 6/26/2023 0913  Last data filed at 6/26/2023 0700  Gross per 24 hour   Intake --   Output 3700 ml   Net -3700 ml            Physical Exam  Vitals reviewed. Constitutional:       General: He is not in acute distress. Appearance: He is obese. He is not diaphoretic. Eyes:      General: No scleral icterus. Right eye: No discharge. Left eye: No discharge. Cardiovascular:      Rate and Rhythm: Normal rate and regular rhythm. Pulmonary:      Effort: Pulmonary effort is normal. No respiratory distress. Breath sounds: No wheezing or rales. Comments: Decreased BS basal right   Abdominal:      General: There is distension. Palpations: Abdomen is soft. Tenderness: There is no abdominal tenderness. Musculoskeletal:      Right lower leg: Edema present. Left lower leg: Edema present. Skin:     General: Skin is warm. Neurological:      Mental Status: He is alert. Psychiatric:         Mood and Affect: Mood normal.         Behavior: Behavior normal.         Thought Content:  Thought content normal.         Judgment: Judgment normal.         Lab, Imaging and other studies:   CBC: No results found for: "WBC", "HGB", "HCT", "MCV", "PLT", "ADJUSTEDWBC", "RBC", "MCH", "MCHC", "RDW", "MPV", "NRBC"  CMP:   Lab Results   Component Value Date    SODIUM 141 06/26/2023    K 3.6 06/26/2023     06/26/2023    CO2 28 06/26/2023    BUN 33 (H) 06/26/2023    CREATININE 2.23 (H) 06/26/2023    CALCIUM 8.2 (L) 06/26/2023    EGFR 28 06/26/2023

## 2023-06-26 NOTE — WOUND OSTOMY CARE
Progress Note - Wound   Tabitha Lucas 69 y.o. male MRN: [id number]  Unit/Bed#: S -01 Encounter: [id number]      Assessment: This is a 69year old male patient admitted on 6/20/23 with acute kidney injury. He has a history of HTN, liver cirrhosis, DM 2, obesity and obstructive sleep apnea on CPAP. He was awake, alert & oriented x 3 and independent with most ADL's. He is continent of urine and stool and ambulates to bathroom independently. Assessment Findings:  1-Intact blood blister to left medial 1st toe with no drainage noted - orders in place for protection. 2-Sacrobuttocks and heels intact - orders in place for skin care and for prevention. Plan:   Skin care plans:   1-Cleanse right 1st toe blood blister with NSS & pat dry. Paint blister with 3M No Sting daily for protection. 2-Hydraguard to bilateral sacrum, buttock and heels BID and PRN  3-Float heels on 2 pillows to offload pressure so heels are not in contact with mattress or pillows. 4-Ehob pressure redistribution cushion in chair when out of bed. 5-Moisturize skin daily with skin nourishing cream.  6-Turn/reposition q2h or when medically stable for pressure re-distribution on skin. Wound 06/24/23 Other (comment) Toe (Comment  which one) Left (Active)   Wound Image   06/26/23 1033   Wound Description Black;purple intact blood blister 06/26/23 1033   Jenn-wound Assessment Intact;Callus 06/26/23 1033   Wound Length (cm) 1.3 cm 06/26/23 1033   Wound Width (cm) 0.8 cm 06/26/23 1033   Wound Depth (cm) 0 cm 06/26/23 1033   Wound Surface Area (cm^2) 1.04 cm^2 06/26/23 1033   Wound Volume (cm^3) 0 cm^3 06/26/23 1033   Calculated Wound Volume (cm^3) 0 cm^3 06/26/23 1033   Drainage Amount None 06/26/23 1033   Non-staged Wound Description Full thickness 06/26/23 1033   Treatments Cleansed 06/26/23 1033   Dressing 3M No Sting Protective Barrier 06/26/23 1033   Wound packed?  No 06/26/23 1033   Dressing Changed New 06/26/23 1033   Dressing Status Intact 06/26/23 1033     Discussed assessment findings, and plan of care/recommendations with Lashawn MCNALLY. Wound care will follow along with patient throughout admission, please call or tiger text with questions and concerns. Recommendations written as orders.   Araceli FRIAS, RN, [employer]

## 2023-06-26 NOTE — PROGRESS NOTES
Day #4 of therapy  Trough 6/25 2100= 15.7  Serum Creatinine improved to 2.6. No doses given; new trough ordered for 6/26 0900. Will dose vancomycin IV x1 dose if trough </=15  Pharmacy to continue to follow labs/renal fx.

## 2023-06-26 NOTE — PROGRESS NOTES
Progress Note - Orthopedics   Vicente Denise 69 y.o. male MRN: [id number]  Unit/Bed#: S -01      Subjective:    69 y.o.male seen and examined at bedside. No significant shoulder pain. Describes rash at the distal margin of his incision. Notes that he has redness, and small white spots over the rash. Complains that dressing changes were not completed over the weekend expeditiously.  Wife at bedside    Labs:  0   Lab Value Date/Time    HCT 25.6 (L) 06/24/2023 0451    HCT 25.4 (L) 06/21/2023 0451    HCT 28.9 (L) 06/20/2023 0339    HGB 8.0 (L) 06/24/2023 0451    HGB 8.0 (L) 06/21/2023 0451    HGB 9.0 (L) 06/20/2023 0339    INR 1.08 06/12/2023 1703    WBC 2.56 (L) 06/24/2023 0451    WBC 3.53 (L) 06/21/2023 0451    WBC 3.91 (L) 06/20/2023 0339    ESR 85 (H) 06/11/2023 1438    CRP 27.0 (H) 06/11/2023 1438       Meds:    Current Facility-Administered Medications:   •  calamine-zinc oxide lotion, , Topical, 4x Daily, Jada Ley MD  •  clotrimazole (LOTRIMIN) 1 % cream, , Topical, BID, AMOR Braga  •  diphenhydrAMINE-zinc acetate (BENADRYL) 2-0.1 % cream, , Topical, TID PRN, Kaycee Woodard MD, Given at 06/23/23 1447  •  heparin (porcine) subcutaneous injection 5,000 Units, 5,000 Units, Subcutaneous, Q8H [address], 5,000 Units at 06/26/23 0603 **AND** [CANCELED] Platelet count, , , Once, Tera Resendiz PA-C  •  insulin glargine (LANTUS) subcutaneous injection 20 Units 0.2 mL, 20 Units, Subcutaneous, HS, Neli So MD, 20 Units at 06/25/23 2117  •  insulin lispro (HumaLOG) 100 units/mL subcutaneous injection 1-5 Units, 1-5 Units, Subcutaneous, HS, Lou Seth PA-C, 2 Units at 06/25/23 2119  •  insulin lispro (HumaLOG) 100 units/mL subcutaneous injection 2-12 Units, 2-12 Units, Subcutaneous, TID AC, 6 Units at 06/25/23 1533 **AND** Fingerstick Glucose (POCT), , , TID AC, Lou Seth PA-C  •  oxyCODONE (ROXICODONE) immediate release tablet 10 mg, 10 mg, Oral, Q6H PRN, Lucero Taylor PA-C, 10 mg at 06/25/23 2132  •  tamsulosin (FLOMAX) capsule 0.4 mg, 0.4 mg, Oral, Daily With Phuong Metz MD, 0.4 mg at 06/25/23 1533  •  traZODone (DESYREL) tablet 50 mg, 50 mg, Oral, HS PRN, Lucero Taylor PA-C, 50 mg at 06/25/23 2132  •  vancomycin (VANCOCIN) 1500 mg in sodium chloride 0.9% 250 mL IVPB, 15 mg/kg (Adjusted), Intravenous, Daily PRN, [facility], CRNP  •  vancomycin (VANCOCIN) 1500 mg in sodium chloride 0.9% 250 mL IVPB, 15 mg/kg (Adjusted), Intravenous, Once, Minoo Singh MD, 1,500 mg at 06/26/23 1024    Blood Culture:   Lab Results   Component Value Date    BLOODCX No Growth After 5 Days. 09/13/2022    BLOODCX No Growth After 5 Days. 09/13/2022       Wound Culture:   Lab Results   Component Value Date    WOUNDCULT 1+ Growth of Klebsiella oxytoca (A) 09/14/2022    WOUNDCULT 1+ Growth of 09/14/2022       Ins and Outs:  I/O last 24 hours: In: -   Out: 3700 [Urine:3700]          Physical:  Vitals:    06/26/23 0727   BP: 134/65   Pulse: 89   Resp: 17   Temp: 98.5 °F (36.9 °C)   SpO2: 98%     Musculoskeletal: left Upper Extremity  • Surgical incision with sutures in place. Well approximated, no further drainage appreciated. There is a rash over the distal margin of incision that has been outlined. It is erythematous, with white patches. • Wound was recovered with ABD and window paned. • No significant surrounding ttp. • ROM not tested at the shoulder. • SILT m/r/u.   • Motor intact ain/pin/m/r/u  • 2+ radial and ulnar pulse  • Musculature is soft and compressible, no pain with passive stretch    Assessment:    67 y.o.male  s/p recent revision shoulder arthroplasty with explantation and spacer placement on 6/13/23 with Dr. Bruno Montoya. Now with some wound drainage. Also with what appears to be distal superficial fungal infection. Plan:  • NWB left upper extremity.    • Ok for range of motion at the elbow and wrist.   • Ok for dressing changes as needed - dry dressings only. Please do not apply ointments or creams to wound. • Will order post operative shoulder XRAYs. Will review when completed. • Continue antibiotics per ID, previously Rx'd. • Recommend management of possible fungal infection per ID and SLIM. • Medical management per primary team.   • No immediate orthopedic intervention indicated. • Rest of care per primary team.   • Dispo: Will review shoulder xrays when completed. • Case reviewed and discussed with Dr. Selvin Magana today. • Patient will need follow up with primary orthopedic surgeon, Dr. Vinod Viveros upon discharge.      Vidya Oscar PA-C

## 2023-06-26 NOTE — DISCHARGE INSTR - OTHER ORDERS
Plan:     Please continue dry dressing changes on a prn basis only (only if dressings become saturated)  May use ABD pad and paper tape. Skin care plans:     1-Cleanse right 1st toe blood blister with wound cleanser or soap and water & pat dry. Paint blister with 3M No Sting daily for protection. 2-Hydraguard barrier cream or equivalent to bilateral sacrum, buttock and heels 2x/day and as needed. 3-Float heels on 2 pillows to offload pressure so heels are not in contact with mattress or pillows. 4-Use pressure redistribution cushion in chair when out of bed. 5-Moisturize skin daily with skin nourishing cream.  6-Turn/reposition for pressure re-distribution on skin. 7-Follow up with your Podiatrist for left toe wound.

## 2023-06-26 NOTE — PROGRESS NOTES
[address] [facility]  Progress Note  Name: Osmin Rodriguez  MRN: [id number]  Unit/Bed#: S -44 I Date of Admission: 6/20/2023   Date of Service: 6/26/2023 I Hospital Day: 6    Assessment/Plan   * ABILIO (acute kidney injury) [facility])  Assessment & Plan  · Most likely suspected secondary to ATN. · Acute interstitial nephritis was also differential diagnosis. · However discussed with nephrology, and since her creatinine is now getting better so soon possibly ATN more likely. · Etiology for ATN is unclear though. · Patient was on IV daptomycin before which has been switched over to IV vancomycin. Dosing as per pharmacy and ID recommendations. · Creatinine is improving. Continue patient on monitoring BMP. Lab Results   Component Value Date    CREATININE 2.23 (H) 06/26/2023     Continue to hold Dyazide and losartan. · Nephrology following. · Discontinued cadena catheter 6/25/23. He is urinating well. No obstruction no problem no need of Cadena catheter again hopefully. Postoperative infection of surgical wound  Assessment & Plan  · Patient is s/p reverse total shoulder arthoplasty on 4/423 and s/p I&D of left shoulder on 5/2/23 with recent admission at [facility] for left shoulder surgical site infection with abscess and possible prosthetic infection and is now s/p explantation and spacer placement on 6/13/23 with cultures growing cutibacterium avidum and staphylococcus epidermidis. · Discharged from [facility] on 6/19/23 on IV daptomycin 700 mg q24h for 6 weeks (until 7/28/23). · Patient now switched to IV vancomycin from daptomycin because of concern for acute kidney injury. · Vancomycin dosing as per pharmacy. Still creatinine improving and hence the final dosing for vancomycin has not been achieved. Will await plateauing of the creatinine before final vancomycin dosing as per ID recommendations.     Bilateral lower extremity edema  Assessment & Plan  · Patient reports worsening b/l lower extremity edema since his recent hospitalization. · RLE edema > LLE on exam which patient reports is typical for his edema. · BNP mildly elevated at 259. · Recent echo on 6/16/23 with EF 60%, G1DD, mild AS. · Not on Lasix as an outpatient; prescribed Dyazide which will be held in the setting of ABILIO. · Venous Doppler negative for DVT  · Elevate extremities. Insulin-dependent diabetes mellitus  Assessment & Plan  Lab Results   Component Value Date    HGBA1C 8.4 (H) 03/20/2023       Recent Labs     06/25/23  1530 06/25/23  2107 06/26/23  0726 06/26/23  1105   POCGLU 254* 238* 115 194*       Blood Sugar Average: Last 72 hrs:  (P) [id number]   • Patient's Accucheck rising, appetite wnl. Resume Amaryl. • Monitor accu-checks AC and HS. • Continue Lantus 20 units with sliding scale coverage  • SSI coverage. • Hypoglycemia protocol. Ulcer of left foot, limited to breakdown of skin [facility])  Assessment & Plan  Consulted podiatry for the same. Cont. Local wound care. Pancytopenia ([address])  Assessment & Plan  · In the setting of cirrhosis  · Blood counts stable           Left shoulder skin area fungal infection: Possible secondary to being moist.  Just dry dressing. Possible clotrimazole cream application as per ID recommendations. Pharmacologic VTE Prophylaxis: Yes Heparin sc. Mechanical VTE Prophylaxis in Place: No   Patient Centered Rounds: I have performed bedside rounds with the Nursing staff today. Current Length of Stay: 6 day(s)  Current Patient Status: Inpatient   Code Status: Level 1 - Full Code  Time Spent for Care:  35 minutes. More than 50% of total time spent on counseling and coordination of care as described above. Discussions with Specialists or Other Care Team Provider: Yes Nephrology, ID, Ortho  Education and Discussions with Family / Patient: Yes, Updated family member. Wife at the bedside. Discharge Plan: 24-48 hours.   Case Discussed with  regarding updating plan of care and disposition planning. As per hospital course. Certification Statement: The patient will continue to require additional inpatient hospital stay due to ABILIO/ATN, left shoulder joint infection. Subjective:   I have seen and Examined the patient at the bedside. No CP or Sob. No fevers or chills, No nausea or vomiting. Overnight events reviewed with the RN. No Other complains. Patient feeling much better. However had complained of some redness around the site of his incision on his left shoulder area. Review of System:   Denies any CP or SOB  Denies any Cough or Cold  Denies any Fevers or chills. Denies any focal tingling numbness or weakness in any extremities. Denies any abdominal pain, Nausea or vomiting. Objective:   Temp (24hrs), Av.6 °F (37 °C), Min:98.5 °F (36.9 °C), Max:98.7 °F (37.1 °C)    Temp:  [98.5 °F (36.9 °C)-98.7 °F (37.1 °C)] 98.5 °F (36.9 °C)  HR:  [] 89  Resp:  [14-17] 17  BP: (110-134)/(58-65) 134/65  SpO2:  [97 %-98 %] 98 %  Body mass index is 36.28 kg/m². Input and Output Summary (last 24 hours): Intake/Output Summary (Last 24 hours) at 2023 1336  Last data filed at 2023 0700  Gross per 24 hour   Intake --   Output 3200 ml   Net -3200 ml     I/O        0701   0700  0701   0700  0701   0700    Urine (mL/kg/hr) 5050 (1.8) 3700 (1.3)     Total Output 5050 3700     Net -5050 -3700                  Physical Exam:   General : Alert, Awake and oriented x 2-3, NAD. Morbidly obese. Neck : Supple. Eyes:  POLA, EOMI. CVS : S1, S2, RRR   R/S : Clear to auscultate anteriorly. Abd: Soft, NT, ND. Bs+ve  Extremity: Bilateral lower extremity chronic venous stasis changes and bilateral extremity 1-2+ pitting edema noted. Improving. Skin: Rash noted possibly fungal on the left shoulder just next to his incision site. Otherwise no tenderness. No spreading. CNS: No acute FND.      Additional Data:     Labs, Culture & Imaging Data Reviewed:    Results from last 7 days   Lab Units 06/24/23  0451   WBC Thousand/uL 2.56*   HEMOGLOBIN g/dL 8.0*   HEMATOCRIT % 25.6*   PLATELETS Thousands/uL 89*     Results from last 7 days   Lab Units 06/26/23  0457 06/23/23  1653 06/23/23  0542   POTASSIUM mmol/L 3.6   < > 5.9*   CHLORIDE mmol/L 108   < > 109*   CO2 mmol/L 28   < > 19*   BUN mg/dL 33*   < > 52*   CREATININE mg/dL 2.23*   < > 4.66*   CALCIUM mg/dL 8.2*   < > 8.2*   ALK PHOS U/L  --   --  167*   ALT U/L  --   --  24   AST U/L  --   --  61*    < > = values in this interval not displayed. Lab Results   Component Value Date    HGBA1C 8.4 (H) 03/20/2023      VAS lower limb venous duplex study, complete bilateral   Final Result by David Ortiz MD (06/20 0713)      CT abdomen pelvis wo contrast   Final Result by Adam Tai MD (06/20 5454)      No hydronephrosis. Cirrhotic liver with portal hypertension. Splenomegaly. Trace ascites. Cholelithiasis. Workstation performed: [id number]         XR chest 1 view portable   ED Interpretation by Saad Dacosta MD (06/20 3008)   No acute cardiopulmonary process noted. Final Result by Jhonatan Linn MD (06/20 6834)      No acute cardiopulmonary disease. Workstation performed: [id number]         XR shoulder 2+ vw left    (Results Pending)       Cultures:   Blood Culture:   Lab Results   Component Value Date    BLOODCX No Growth After 5 Days. 09/13/2022    BLOODCX No Growth After 5 Days.  09/13/2022     Urine Culture: No results found for: "URINECX"  Sputum Culture: No components found for: "Bela Oh"  Wound Culture:   Lab Results   Component Value Date    WOUNDCULT 1+ Growth of Klebsiella oxytoca (A) 09/14/2022    WOUNDCULT 1+ Growth of 09/14/2022       Last 24 Hours Medication List:   Current Facility-Administered Medications   Medication Dose Route Frequency Provider Last Rate   • calamine-zinc oxide   Topical 4x Daily [address] Mary MD     • clotrimazole   Topical BID [address] [facility] AMOR Quiros     • diphenhydrAMINE-zinc acetate   Topical TID PRN Kandace Trinh MD     • [START ON 6/27/2023] glimepiride  2 mg Oral Daily With Breakfast Kandace Trinh MD     • heparin (porcine)  5,000 Units Subcutaneous [facility] Umm Roberson PA-C     • insulin glargine  20 Units Subcutaneous HS Kandace Trinh MD     • insulin lispro  1-5 Units Subcutaneous HS Umm Roberson PA-C     • insulin lispro  2-12 Units Subcutaneous TID AC Umm Roberson PA-C     • oxyCODONE  10 mg Oral Q6H PRN Umm Roberson PA-C     • tamsulosin  0.4 mg Oral Daily With Swapna Herron MD     • traZODone  50 mg Oral HS PRN Umm Roberson PA-C     • vancomycin  15 mg/kg (Adjusted) Intravenous Daily PRN AMOR Valenzuela           Patient is at moderate risk for morbidity and mortality due to above mentioned illness and comorbidities.

## 2023-06-26 NOTE — ASSESSMENT & PLAN NOTE
· Patient is s/p reverse total shoulder arthoplasty on 4/423 and s/p I&D of left shoulder on 5/2/23 with recent admission at [facility] for left shoulder surgical site infection with abscess and possible prosthetic infection and is now s/p explantation and spacer placement on 6/13/23 with cultures growing cutibacterium avidum and staphylococcus epidermidis. · Discharged from [facility] on 6/19/23 on IV daptomycin 700 mg q24h for 6 weeks (until 7/28/23). · Patient now switched to IV vancomycin from daptomycin because of concern for acute kidney injury. · Vancomycin dosing as per pharmacy. Still creatinine improving and hence the final dosing for vancomycin has not been achieved. Will await plateauing of the creatinine before final vancomycin dosing as per ID recommendations.

## 2023-06-26 NOTE — ASSESSMENT & PLAN NOTE
Lab Results   Component Value Date    HGBA1C 8.4 (H) 03/20/2023       Recent Labs     06/25/23  1530 06/25/23  2107 06/26/23  0726 06/26/23  1105   POCGLU 254* 238* 115 194*       Blood Sugar Average: Last 72 hrs:  (P) 187.8541088945375262   • Patient's Accucheck rising, appetite wnl. Resume Amaryl. • Monitor accu-checks AC and HS. • Continue Lantus 20 units with sliding scale coverage  • SSI coverage. • Hypoglycemia protocol.

## 2023-06-26 NOTE — PROGRESS NOTES
Progress Note - Infectious Disease   Julissa Bring 69 y.o. male MRN: [id number]  Unit/Bed#: S -80 Encounter: [id number]    ADDENDUM 6/26/2023 9:39 - Spoke to ID pharmacy team regarding vancomycin dosing and trough goal. At this time patient still ABILIO but with downtrending creatinine. Will continue pulse dosing and pharmacy management while inpatient. With current renal function his home vancomycin dose is looking like 1500mg q24 with  and goal trough of 12.8-19.2. Will further adjust this plan once patient closer to discharge. Impression/Plan:  1. 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 Patient initial arthroplasty on 4/4/2023. Post operatively he developed purulent weeping from the site and shoulder imaging revealed a fluid collection communicating to the joint space. Cultures from I&D 5/2/2023 grew coagulase-negative staph and alphahemolytic strep in broth only, as well as growth of cutibacterium acnes and avidum. He then underwent L shoulder resection arthroplasty with insertion of antibiotic spacer on 6/13/2023. OR cultures grew Cutibacterium avidum, MRSE, and micrococcus luteus. PICC was placed and patient was placed on Daptomycin to complete a 6-week course of IV antibiotics after antibiotic spacer, through 7/28/2023. Patient now back at hospital with ABILIO with concern for ATN and renal failure. Consider role of antibiotic in renal function change. Patient has been transitioned to IV Vancomycin and he is tolerating this antibiotic without difficulty. I will continue this for now.  Will coordinate outpatient vancomycin once patient is closer to discharge.  -continue IV vancomycin through 7/28/2023 for 6 weeks of IV antibiotic treatment  -continue pharmacy consult for guidance on vancomycin dosage  -will begin planning for transition to home antibiotic plan  -check CBCD and BMP tomorrow  -monitor vitals  -serial L shoulder exams  -surgical site care per orthopedic surgery  -continue follow up with orthopedic surgery  -RUE PICC to be removed by home RN after final dose of IV antibiotic on 7/28/2023  -patient to follow in the outpatient ID office after discharge, appointment is on 7/3/2023 at 9:30 with Dr Camila Cespedes     2. ABILIO. Upon review of patient's available medical records it appears his baseline creatinine is approximately 1-1.2 Creatinine was 2.29 upon admission. Then increased further today to 4.78. Tejeda temporarily placed for accurate I&O and in setting of intermittent retention. Nephrology is follow closely. There is concern for ATN. Creatinine is now down trending  -antibiotic now changed as above  -volume management per primary service/nephrology   -monitor creatinine  -dose adjust antibiotic for renal function as needed  -monitor urine output  -continue close follow up with nephrology     3. Chronic leukopenia and thrombocytopenia. Likely due to patient's cirrhosis. CBCD currently stable.  -resume outpatient GI and hematology follow up with [facility] after discharge     4. Type 2 diabetes mellitus with long term insulin use. Patient's last HbA1c was 8.4% on 3/20/2023. Elevated blood glucose is risk factor for infection and complications with healing. Recommend tight glycemic control  -blood glucose management per primary service     5. Obesity. BMI = 36.26.   -dose adjust antibiotics for patient weight as needed      6. Antibiotic allergy. Patient reports hives with sulfa antibiotics. We will avoid this drug class for now.  -monitor patient for adverse medication reactions    7. Fungal rash. Patient with yeast extending past the distal end of his wound incision. Suspect this is secondary to the moisture he's had under his dressings. Recommend starting thin layer of clotrimazole cream with a less occlusive dressing like ABD. Bedside RN aware. Recommend orthopedic surgery assess incision site.   -start Clotrimazole cream  -recommend orthopedic evaluation of L shoulder incision site     Above plan was discussed in detail with patient at the bedside. Above plan was discussed in detail with primary service. Antibiotics:  Vancomycin 5  Antibiotics 15    Subjective:  Patient reports he's doing fine this morning but he's very concerned about a red area surrounding his surgical site. Reports yesterday he was told there was a red area and he saw the area getting worse. Reports his wife has additionally noticed white spots. Patient requesting to meet with orthopedics as he says they haven't seen his incision since last week. He is frustrated with the potential for new infection. Reports the shoulder joint itself is not hurting and he's not have change in ROM. He has no fever, chills, sweats, shakes; no nausea, vomiting, abdominal pain, diarrhea, or dysuria; no cough, shortness of breath, or chest pain. Reports his leg swelling is a lot better. He offers no concerns with the PICC. No new symptoms. Objective:  Vitals:  Temp:  [97.9 °F (36.6 °C)-98.7 °F (37.1 °C)] 98.6 °F (37 °C)  HR:  [] 103  Resp:  [12-16] 16  BP: (110-134)/(58-60) 110/58  SpO2:  [97 %-99 %] 97 %  Temp (24hrs), Av.4 °F (36.9 °C), Min:97.9 °F (36.6 °C), Max:98.7 °F (37.1 °C)  Current: Temperature: 98.6 °F (37 °C)    Physical Exam:   General Appearance:  Alert, interactive, nontoxic, no acute distress. He appears comfortable sitting out of his bed in the chair. He is wearing his glasses. Lungs:   Clear to auscultation bilaterally; no wheezes, rhonchi or rales; respirations unlabored on room air. Heart:  RRR; no murmur, rub or gallop. Abdomen:   Soft, obese, non-tender, non-distended, positive bowel sounds. Extremities: L shoulder incision with intact sutures, no active bleeding/drainge from proximal aspect of site but bottom third has serous output and some dried blood crusting his sutures.  Around distal aspect of wound and extending to the L upper arm there is a dense patchy erythema with pasty white spots, also extending into underarm crease. Skin: No new draining wounds noted on exposed skin. New rash noted in extremities assessment above. Labs, Imaging, & Other studies:   All pertinent labs and imaging studies were personally reviewed  Results from last 7 days   Lab Units 06/24/23  0451 06/21/23  0451 06/20/23  0339   WBC Thousand/uL 2.56* 3.53* 3.91*   HEMOGLOBIN g/dL 8.0* 8.0* 9.0*   PLATELETS Thousands/uL 89* 86* 91*     Results from last 7 days   Lab Units 06/26/23  0457 06/23/23  1653 06/23/23  0542 06/20/23  1811 06/20/23  0339   POTASSIUM mmol/L 3.6   < > 5.9*   < > 4.3   CHLORIDE mmol/L 108   < > 109*   < > 104   CO2 mmol/L 28   < > 19*   < > 25   BUN mg/dL 33*   < > 52*   < > 28*   CREATININE mg/dL 2.23*   < > 4.66*   < > 2.29*   EGFR ml/min/1.73sq m 28   < > 11   < > 28   CALCIUM mg/dL 8.2*   < > 8.2*   < > 8.7   AST U/L  --   --  61*  --  40*   ALT U/L  --   --  24  --  27   ALK PHOS U/L  --   --  167*  --  161*    < > = values in this interval not displayed.

## 2023-06-26 NOTE — PROGRESS NOTES
Yared Abebe is a 69 y.o. male who is currently ordered Vancomycin IV with management by the Pharmacy Consult service. Relevant clinical data and objective / subjective history reviewed. Vancomycin Assessment:  Indication and Goal AUC/Trough: Bone/joint infection (goal -600, trough >10), -600, trough >10  Clinical Status: improving  Micro:     Renal Function:  SCr: 2.23 mg/dL  CrCl: 40.9 mL/min  Renal replacement: Not on dialysis  Days of Therapy: 5  Current Dose: 1250 mg IV for level <15  Vancomycin Plan:  New Dosin mg IV as needed for level <15, random level came back as 12 so a dose will be administered today. Next Level:  at 0600  Renal Function Monitoring: Daily BMP and UOP  Pharmacy will continue to follow closely for s/sx of nephrotoxicity, infusion reactions and appropriateness of therapy. BMP and CBC will be ordered per protocol. We will continue to follow the patient’s culture results and clinical progress daily.     Nikole Soto, Pharmacist

## 2023-06-26 NOTE — ASSESSMENT & PLAN NOTE
· Most likely suspected secondary to ATN. · Acute interstitial nephritis was also differential diagnosis. · However discussed with nephrology, and since her creatinine is now getting better so soon possibly ATN more likely. · Etiology for ATN is unclear though. · Patient was on IV daptomycin before which has been switched over to IV vancomycin. Dosing as per pharmacy and ID recommendations. · Creatinine is improving. Continue patient on monitoring BMP. Lab Results   Component Value Date    CREATININE 2.23 (H) 06/26/2023     Continue to hold Dyazide and losartan. · Nephrology following. · Discontinued cadena catheter 6/25/23. He is urinating well. No obstruction no problem no need of Cadena catheter again hopefully.

## 2023-06-27 ENCOUNTER — NURSE TRIAGE (OUTPATIENT)
Age: 68
End: 2023-06-27

## 2023-06-27 LAB
ALBUMIN SERPL BCP-MCNC: 3 G/DL (ref 3.5–5)
ALP SERPL-CCNC: 177 U/L (ref 34–104)
ALT SERPL W P-5'-P-CCNC: 28 U/L (ref 7–52)
ANION GAP SERPL CALCULATED.3IONS-SCNC: 7 MMOL/L
AST SERPL W P-5'-P-CCNC: 43 U/L (ref 13–39)
BACTERIA SPEC ANAEROBE CULT: NO GROWTH
BACTERIA TISS AEROBE CULT: NO GROWTH
BASOPHILS # BLD AUTO: 0.02 THOUSANDS/ÂΜL (ref 0–0.1)
BASOPHILS NFR BLD AUTO: 1 % (ref 0–1)
BILIRUB SERPL-MCNC: 0.87 MG/DL (ref 0.2–1)
BUN SERPL-MCNC: 25 MG/DL (ref 5–25)
CALCIUM ALBUM COR SERPL-MCNC: 9.2 MG/DL (ref 8.3–10.1)
CALCIUM SERPL-MCNC: 8.4 MG/DL (ref 8.4–10.2)
CHLORIDE SERPL-SCNC: 107 MMOL/L (ref 96–108)
CO2 SERPL-SCNC: 26 MMOL/L (ref 21–32)
CREAT SERPL-MCNC: 1.7 MG/DL (ref 0.6–1.3)
EOSINOPHIL # BLD AUTO: 0.27 THOUSAND/ÂΜL (ref 0–0.61)
EOSINOPHIL NFR BLD AUTO: 12 % (ref 0–6)
ERYTHROCYTE [DISTWIDTH] IN BLOOD BY AUTOMATED COUNT: 15.8 % (ref 11.6–15.1)
GFR SERPL CREATININE-BSD FRML MDRD: 40 ML/MIN/1.73SQ M
GLUCOSE SERPL-MCNC: 104 MG/DL (ref 65–140)
GLUCOSE SERPL-MCNC: 110 MG/DL (ref 65–140)
GLUCOSE SERPL-MCNC: 142 MG/DL (ref 65–140)
GLUCOSE SERPL-MCNC: 188 MG/DL (ref 65–140)
GLUCOSE SERPL-MCNC: 190 MG/DL (ref 65–140)
GRAM STN SPEC: NORMAL
HCT VFR BLD AUTO: 26.7 % (ref 36.5–49.3)
HGB BLD-MCNC: 8.3 G/DL (ref 12–17)
IMM GRANULOCYTES # BLD AUTO: 0.01 THOUSAND/UL (ref 0–0.2)
IMM GRANULOCYTES NFR BLD AUTO: 0 % (ref 0–2)
LYMPHOCYTES # BLD AUTO: 0.5 THOUSANDS/ÂΜL (ref 0.6–4.47)
LYMPHOCYTES NFR BLD AUTO: 22 % (ref 14–44)
MCH RBC QN AUTO: 26.9 PG (ref 26.8–34.3)
MCHC RBC AUTO-ENTMCNC: 31.1 G/DL (ref 31.4–37.4)
MCV RBC AUTO: 87 FL (ref 82–98)
MONOCYTES # BLD AUTO: 0.27 THOUSAND/ÂΜL (ref 0.17–1.22)
MONOCYTES NFR BLD AUTO: 12 % (ref 4–12)
NEUTROPHILS # BLD AUTO: 1.22 THOUSANDS/ÂΜL (ref 1.85–7.62)
NEUTS SEG NFR BLD AUTO: 53 % (ref 43–75)
NRBC BLD AUTO-RTO: 0 /100 WBCS
PLATELET # BLD AUTO: 105 THOUSANDS/UL (ref 149–390)
PMV BLD AUTO: 12.3 FL (ref 8.9–12.7)
POTASSIUM SERPL-SCNC: 3.7 MMOL/L (ref 3.5–5.3)
PROT SERPL-MCNC: 6.2 G/DL (ref 6.4–8.4)
RBC # BLD AUTO: 3.08 MILLION/UL (ref 3.88–5.62)
SODIUM SERPL-SCNC: 140 MMOL/L (ref 135–147)
VANCOMYCIN SERPL-MCNC: 13.9 UG/ML (ref 10–20)
WBC # BLD AUTO: 2.29 THOUSAND/UL (ref 4.31–10.16)

## 2023-06-27 PROCEDURE — 99233 SBSQ HOSP IP/OBS HIGH 50: CPT | Performed by: NURSE PRACTITIONER

## 2023-06-27 PROCEDURE — 80202 ASSAY OF VANCOMYCIN: CPT | Performed by: PODIATRIST

## 2023-06-27 PROCEDURE — 99024 POSTOP FOLLOW-UP VISIT: CPT | Performed by: PHYSICIAN ASSISTANT

## 2023-06-27 PROCEDURE — 80053 COMPREHEN METABOLIC PANEL: CPT | Performed by: INTERNAL MEDICINE

## 2023-06-27 PROCEDURE — 85025 COMPLETE CBC W/AUTO DIFF WBC: CPT | Performed by: INTERNAL MEDICINE

## 2023-06-27 PROCEDURE — 82948 REAGENT STRIP/BLOOD GLUCOSE: CPT

## 2023-06-27 PROCEDURE — 99232 SBSQ HOSP IP/OBS MODERATE 35: CPT | Performed by: PHYSICIAN ASSISTANT

## 2023-06-27 PROCEDURE — 99232 SBSQ HOSP IP/OBS MODERATE 35: CPT | Performed by: INTERNAL MEDICINE

## 2023-06-27 RX ORDER — VANCOMYCIN HYDROCHLORIDE 500 MG/100ML
500 INJECTION, SOLUTION INTRAVENOUS ONCE
Status: COMPLETED | OUTPATIENT
Start: 2023-06-27 | End: 2023-06-27

## 2023-06-27 RX ORDER — FUROSEMIDE 20 MG/1
20 TABLET ORAL DAILY
Status: DISCONTINUED | OUTPATIENT
Start: 2023-06-27 | End: 2023-06-28 | Stop reason: HOSPADM

## 2023-06-27 RX ORDER — FLUCONAZOLE 100 MG/1
200 TABLET ORAL EVERY 24 HOURS
Status: DISCONTINUED | OUTPATIENT
Start: 2023-06-27 | End: 2023-06-28 | Stop reason: HOSPADM

## 2023-06-27 RX ADMIN — INSULIN LISPRO 1 UNITS: 100 INJECTION, SOLUTION INTRAVENOUS; SUBCUTANEOUS at 22:15

## 2023-06-27 RX ADMIN — INSULIN LISPRO 2 UNITS: 100 INJECTION, SOLUTION INTRAVENOUS; SUBCUTANEOUS at 12:11

## 2023-06-27 RX ADMIN — FLUCONAZOLE 200 MG: 100 TABLET ORAL at 15:10

## 2023-06-27 RX ADMIN — HEPARIN SODIUM 5000 UNITS: 5000 INJECTION INTRAVENOUS; SUBCUTANEOUS at 22:14

## 2023-06-27 RX ADMIN — TAMSULOSIN HYDROCHLORIDE 0.4 MG: 0.4 CAPSULE ORAL at 17:29

## 2023-06-27 RX ADMIN — ALTEPLASE 2 MG: 2.2 INJECTION, POWDER, LYOPHILIZED, FOR SOLUTION INTRAVENOUS at 14:43

## 2023-06-27 RX ADMIN — INSULIN GLARGINE 20 UNITS: 100 INJECTION, SOLUTION SUBCUTANEOUS at 22:14

## 2023-06-27 RX ADMIN — VANCOMYCIN HYDROCHLORIDE 500 MG: 500 INJECTION, SOLUTION INTRAVENOUS at 12:48

## 2023-06-27 RX ADMIN — OXYCODONE HYDROCHLORIDE 10 MG: 10 TABLET ORAL at 22:14

## 2023-06-27 RX ADMIN — GLIMEPIRIDE 2 MG: 2 TABLET ORAL at 08:46

## 2023-06-27 RX ADMIN — FUROSEMIDE 20 MG: 20 TABLET ORAL at 13:49

## 2023-06-27 RX ADMIN — HEPARIN SODIUM 5000 UNITS: 5000 INJECTION INTRAVENOUS; SUBCUTANEOUS at 13:49

## 2023-06-27 RX ADMIN — TRAZODONE HYDROCHLORIDE 50 MG: 50 TABLET ORAL at 22:14

## 2023-06-27 RX ADMIN — VANCOMYCIN HYDROCHLORIDE 1500 MG: 5 INJECTION, POWDER, LYOPHILIZED, FOR SOLUTION INTRAVENOUS at 10:23

## 2023-06-27 RX ADMIN — HEPARIN SODIUM 5000 UNITS: 5000 INJECTION INTRAVENOUS; SUBCUTANEOUS at 06:28

## 2023-06-27 NOTE — ASSESSMENT & PLAN NOTE
· Most likely suspected secondary to ATN. · Acute interstitial nephritis was also differential diagnosis. · However discussed with nephrology, and since her creatinine is now getting better so soon possibly ATN more likely. · Etiology for ATN is unclear though. · Patient was on IV daptomycin before which has been switched over to IV vancomycin. Dosing as per pharmacy and ID recommendations. · Creatinine is improving. Continue patient on monitoring BMP. Lab Results   Component Value Date    CREATININE 1.70 (H) 06/27/2023     Continue to hold Dyazide and losartan. · Nephrology following. · Discontinued cadena catheter 6/25/23. He is urinating well. No obstruction no problem no need of Cadena catheter again hopefully.

## 2023-06-27 NOTE — ASSESSMENT & PLAN NOTE
· Patient is s/p reverse total shoulder arthoplasty on 4/423 and s/p I&D of left shoulder on 5/2/23 with recent admission at [facility] for left shoulder surgical site infection with abscess and possible prosthetic infection and is now s/p explantation and spacer placement on 6/13/23 with cultures growing cutibacterium avidum and staphylococcus epidermidis. · Discharged from [facility] on 6/19/23 on IV daptomycin 700 mg q24h for 6 weeks (until 7/28/23). · Patient now switched to IV vancomycin from daptomycin because of concern for acute kidney injury. continue IV vancomycin through 7/28/2023 for 6 weeks of IV antibiotic treatment  · Presently on p.o. fluconazole through 7/1 for fungal infection  · Vancomycin dosing as per pharmacy. Still creatinine improving and hence the final dosing for vancomycin has not been achieved.    · Will need VNA upon discharge, case management to assist.

## 2023-06-27 NOTE — PROGRESS NOTES
Sherry Case is a 69 y.o. male who is currently ordered Vancomycin IV with management by the Pharmacy Consult service. Relevant clinical data and objective / subjective history reviewed. Vancomycin Assessment:  Indication and Goal AUC/Trough: Bone/joint infection (goal -600, trough >10), -600, trough >10  Clinical Status: improving  Micro:     Renal Function:  SCr: 1.7 mg/dL  CrCl: 53.4 mL/min  Renal replacement: Not on dialysis  Days of Therapy: 6  Current Dose: 1500 mg IV as needed for level <15  Vancomycin Plan:  New Dosing: random level is 13.9, continue current dose , if kidney functions continue to improve and patient still on vancomycin may consider change to scheduled dosing. Next Level: 06/28 at 0600  Renal Function Monitoring: Daily BMP and UOP  Pharmacy will continue to follow closely for s/sx of nephrotoxicity, infusion reactions and appropriateness of therapy. BMP and CBC will be ordered per protocol. We will continue to follow the patient’s culture results and clinical progress daily.     Gail Hooker, Pharmacist

## 2023-06-27 NOTE — PROGRESS NOTES
Progress Note - Orthopedics   Vinny Cedillo 69 y.o. male MRN: [id number]  Unit/Bed#: S -01      Subjective:    69 y.o.male seen and examined at bedside. No significant shoulder pain. Describes rash at the distal margin of his incision, this has improved to some degree from yesterday. He has no other concerns this morning. Is eager for discharge when able.      Labs:  0   Lab Value Date/Time    HCT 26.7 (L) 06/27/2023 0624    HCT 25.6 (L) 06/24/2023 0451    HCT 25.4 (L) 06/21/2023 0451    HGB 8.3 (L) 06/27/2023 0624    HGB 8.0 (L) 06/24/2023 0451    HGB 8.0 (L) 06/21/2023 0451    INR 1.08 06/12/2023 1703    WBC 2.29 (L) 06/27/2023 0624    WBC 2.56 (L) 06/24/2023 0451    WBC 3.53 (L) 06/21/2023 0451    ESR 85 (H) 06/11/2023 1438    CRP 27.0 (H) 06/11/2023 1438       Meds:    Current Facility-Administered Medications:   •  calamine-zinc oxide lotion, , Topical, 4x Daily, Hadley Menon MD  •  diphenhydrAMINE-zinc acetate (BENADRYL) 2-0.1 % cream, , Topical, TID PRN, Kwasi Blue MD, Given at 06/23/23 1447  •  fluconazole (DIFLUCAN) tablet 200 mg, 200 mg, Oral, Q24H, Anthony Chang PA-C  •  glimepiride (AMARYL) tablet 2 mg, 2 mg, Oral, Daily With Breakfast, Kwasi Blue MD, 2 mg at 06/27/23 0846  •  heparin (porcine) subcutaneous injection 5,000 Units, 5,000 Units, Subcutaneous, Q8H [address], 5,000 Units at 06/27/23 0628 **AND** [CANCELED] Platelet count, , , Once, Verner Dye, PA-C  •  insulin glargine (LANTUS) subcutaneous injection 20 Units 0.2 mL, 20 Units, Subcutaneous, HS, Neli Gonzales MD, 20 Units at 06/26/23 2255  •  insulin lispro (HumaLOG) 100 units/mL subcutaneous injection 1-5 Units, 1-5 Units, Subcutaneous, HS, Lou Collins PA-C, 1 Units at 06/26/23 2255  •  insulin lispro (HumaLOG) 100 units/mL subcutaneous injection 2-12 Units, 2-12 Units, Subcutaneous, TINICOLE PAINTING, 4 Units at 06/26/23 1787 **AND** Fingerstick Glucose (POCT), , , MAXWELL PAINTING, Lou Collins PA-C  • oxyCODONE (ROXICODONE) immediate release tablet 10 mg, 10 mg, Oral, Q6H PRN, Isabell Fischer PA-C, 10 mg at 06/26/23 2254  •  tamsulosin (FLOMAX) capsule 0.4 mg, 0.4 mg, Oral, Daily With Dandre Sanches MD, 0.4 mg at 06/26/23 1757  •  traZODone (DESYREL) tablet 50 mg, 50 mg, Oral, HS PRN, Isabell Fischer PA-C, 50 mg at 06/26/23 2254  •  vancomycin (VANCOCIN) 1500 mg in sodium chloride 0.9% 250 mL IVPB, 15 mg/kg (Adjusted), Intravenous, Once, Eliud Roldan MD, 1,500 mg at 06/27/23 1023  •  [START ON 6/28/2023] vancomycin (VANCOCIN) 2,000 mg in sodium chloride 0.9 % 500 mL IVPB, 2,000 mg, Intravenous, Q24H, AMOR Wall  •  vancomycin (VANCOCIN) IVPB (premix in dextrose) 500 mg 100 mL, 500 mg, Intravenous, Once, [facility], CRNP    Blood Culture:   Lab Results   Component Value Date    BLOODCX No Growth After 5 Days. 09/13/2022    BLOODCX No Growth After 5 Days. 09/13/2022       Wound Culture:   Lab Results   Component Value Date    WOUNDCULT 1+ Growth of Klebsiella oxytoca (A) 09/14/2022    WOUNDCULT 1+ Growth of 09/14/2022       Ins and Outs:  I/O last 24 hours: In: -   Out: 2300 [Urine:2300]          Physical:  Vitals:    06/27/23 0704   BP: 142/58   Pulse: 80   Resp: 19   Temp: 98.3 °F (36.8 °C)   SpO2: 100%     Musculoskeletal: left Upper Extremity  • Surgical incision with sutures in place. Well approximated, no further drainage appreciated. There is a rash over the distal margin of incision that has been outlined. It is erythematous, with white patches. It has improved from yesterday. • Dressings with scant drainage. • Wound was recovered with ABD and window paned. • No significant surrounding ttp. • ROM not tested at the shoulder.    • SILT m/r/u.   • Motor intact ain/pin/m/r/u  • 2+ radial and ulnar pulse  • Musculature is soft and compressible, no pain with passive stretch    Radiology  I have personally reviewed imaging studies  XRAYs left shoulder: expected post operative changes. Assessment:    69 y.o.male  s/p recent revision shoulder arthroplasty with explantation and spacer placement on 6/13/23 with Dr. Mya Vasquez. Now with some wound drainage. Also with what appears to be distal superficial fungal infection. Plan:  • NWB left upper extremity. • Ok for range of motion at the elbow and wrist.   • Ok for dressing changes as needed - dry dressings only. Please do not apply ointments or creams to wound. • Continue antibiotics per ID. Appreciate assistance. • Recommend management of possible fungal infection per ID and SLIM. • Discussed at length yesterday avoidance of topical therapies surrounding surgical incision/wound. • Medical management per primary team.   • No immediate orthopedic intervention indicated. • Rest of care per primary team.   • Dispo: Ortho stable. • Case was reviewed and discussed with patients primary orthopedic surgeon, Dr. Mya Vasquez yesterday. • Patient will need follow up with primary orthopedic surgeon, Dr. Mya Vasquez upon discharge.      Vidya Madrid PA-C

## 2023-06-27 NOTE — PROGRESS NOTES
Vancomycin Discharge Planning     Julissa Grover is a good fit with the [employer] Vancomycin Bayesian model. A surrogate trough range at q24h dosing that correlates with an AUC range of 400-600 for this patient is 11.4-17. 2. For questions regarding this range, or for recommendations for a surrogate trough range at an alternative dosing frequency, please contact the ID pharmacy team via [phone].      Cedrick Simental, PharmD, [address]  Infectious Diseases Clinical Pharmacy Specialist

## 2023-06-27 NOTE — ASSESSMENT & PLAN NOTE
Lab Results   Component Value Date    HGBA1C 8.4 (H) 03/20/2023       Recent Labs     06/26/23  1608 06/26/23  2106 06/27/23  0704 06/27/23  1017   POCGLU 204* 203* 104 190*       Blood Sugar Average: Last 72 hrs:  (P) [id number]   • Patient's Accucheck rising, appetite wnl. Resume Amaryl. • Monitor accu-checks AC and HS. • Continue Lantus 20 units with sliding scale coverage  • SSI coverage. • Hypoglycemia protocol.

## 2023-06-27 NOTE — PROGRESS NOTES
[address] [facility]  Progress Note  Name: Aydin Madsen  MRN: [id number]  Unit/Bed#: S -70 I Date of Admission: 6/20/2023   Date of Service: 6/27/2023 I Hospital Day: 7    Assessment/Plan   * ABILIO (acute kidney injury) [facility])  Assessment & Plan  · Most likely suspected secondary to ATN. · Acute interstitial nephritis was also differential diagnosis. · However discussed with nephrology, and since her creatinine is now getting better so soon possibly ATN more likely. · Etiology for ATN is unclear though. · Patient was on IV daptomycin before which has been switched over to IV vancomycin. Dosing as per pharmacy and ID recommendations. · Creatinine is improving. Continue patient on monitoring BMP. Lab Results   Component Value Date    CREATININE 1.70 (H) 06/27/2023     Continue to hold Dyazide and losartan. · Nephrology following. · Discontinued cadena catheter 6/25/23. He is urinating well. No obstruction no problem no need of Cadena catheter again hopefully. Postoperative infection of surgical wound  Assessment & Plan  · Patient is s/p reverse total shoulder arthoplasty on 4/423 and s/p I&D of left shoulder on 5/2/23 with recent admission at [facility] for left shoulder surgical site infection with abscess and possible prosthetic infection and is now s/p explantation and spacer placement on 6/13/23 with cultures growing cutibacterium avidum and staphylococcus epidermidis. · Discharged from [facility] on 6/19/23 on IV daptomycin 700 mg q24h for 6 weeks (until 7/28/23). · Patient now switched to IV vancomycin from daptomycin because of concern for acute kidney injury. continue IV vancomycin through 7/28/2023 for 6 weeks of IV antibiotic treatment  · Presently on p.o. fluconazole through 7/1 for fungal infection  · Vancomycin dosing as per pharmacy. Still creatinine improving and hence the final dosing for vancomycin has not been achieved.    · Will need VNA upon discharge, case management to assist.    Bilateral lower extremity edema  Assessment & Plan  · Patient reports worsening b/l lower extremity edema since his recent hospitalization. · RLE edema > LLE on exam which patient reports is typical for his edema. · BNP mildly elevated at 259. · Recent echo on 6/16/23 with EF 60%, G1DD, mild AS. · Not on Lasix as an outpatient; prescribed Dyazide which will be held in the setting of ABILIO. · Venous Doppler negative for DVT  · Elevate extremities. Ulcer of left foot, limited to breakdown of skin [facility])  Assessment & Plan  · Consulted podiatry for the same. · Cont. Local wound care. Morbid obesity ([address])  Assessment & Plan  · BMI 36.50. · Recommend diet, lifestyle modifications. Pancytopenia ([address])  Assessment & Plan  · In the setting of cirrhosis  · Blood counts stable    MEG (obstructive sleep apnea)  Assessment & Plan  · Continue CPAP HS. Alcoholic cirrhosis ([address])  Assessment & Plan  · CT shows "cirrhotic liver with portal hypertension. Splenomegaly. Trace ascites."  · Follows with GI as an outpatient. Essential hypertension  Assessment & Plan  · Stable. · Continue Cardizem. · Losartan on hold. Insulin-dependent diabetes mellitus  Assessment & Plan  Lab Results   Component Value Date    HGBA1C 8.4 (H) 03/20/2023       Recent Labs     06/26/23  1608 06/26/23  2106 06/27/23  0704 06/27/23  1017   POCGLU 204* 203* 104 190*       Blood Sugar Average: Last 72 hrs:  (P) [id number]   • Patient's Accucheck rising, appetite wnl. Resume Amaryl. • Monitor accu-checks AC and HS. • Continue Lantus 20 units with sliding scale coverage  • SSI coverage. • Hypoglycemia protocol. VTE Pharmacologic Prophylaxis: VTE Score: 3 Moderate Risk (Score 3-4) - Pharmacological DVT Prophylaxis Ordered: heparin. Patient Centered Rounds: I performed bedside rounds with nursing staff today.   Discussions with Specialists or Other Care Team Provider: carroll, rn    Education and Discussions with Family / Patient: Updated  (wife) at bedside. Time Spent for Care: 35 minutes. More than 50% of total time spent on counseling and coordination of care as described above. Current Length of Stay: 7 day(s)  Current Patient Status: Inpatient   Certification Statement: The patient will continue to require additional inpatient hospital stay due to ABILIO with left shoulder wound requiring IV antibiotics as well as home VNA set up and serial lab monitoring  Discharge Plan: Anticipate discharge in 24-48 hrs to home with home services. Code Status: Level 1 - Full Code    Subjective:   Patient is doing well. No complaints presently. His appetite is good. He is moving in the room. Wants to go home as soon as possible. Objective:     Vitals:   Temp (24hrs), Av.2 °F (36.8 °C), Min:97.3 °F (36.3 °C), Max:99 °F (37.2 °C)    Temp:  [97.3 °F (36.3 °C)-99 °F (37.2 °C)] 98.3 °F (36.8 °C)  HR:  [77-81] 80  Resp:  [16-19] 19  BP: (111-142)/(52-60) 142/58  SpO2:  [100 %] 100 %  Body mass index is 35.82 kg/m². Input and Output Summary (last 24 hours): Intake/Output Summary (Last 24 hours) at 2023 1102  Last data filed at 2023 2109  Gross per 24 hour   Intake --   Output 2300 ml   Net -2300 ml       Physical Exam:   Physical Exam  Vitals and nursing note reviewed. Constitutional:       General: He is not in acute distress. Appearance: Normal appearance. HENT:      Head: Normocephalic. Mouth/Throat:      Mouth: Mucous membranes are moist.   Eyes:      Pupils: Pupils are equal, round, and reactive to light. Cardiovascular:      Rate and Rhythm: Normal rate and regular rhythm. Heart sounds: No murmur heard. Pulmonary:      Effort: Pulmonary effort is normal. No respiratory distress. Breath sounds: Normal breath sounds. No wheezing, rhonchi or rales. Abdominal:      General: Bowel sounds are normal. There is no distension.       Palpations: Abdomen is soft. Tenderness: There is no abdominal tenderness. There is no guarding. Musculoskeletal:         General: No deformity. Cervical back: Normal range of motion. Right lower leg: No edema. Left lower leg: No edema. Comments: Left shoulder wound healing, CDI   Skin:     Capillary Refill: Capillary refill takes less than 2 seconds. Neurological:      General: No focal deficit present. Mental Status: He is alert and oriented to person, place, and time. Mental status is at baseline. Additional Data:     Labs:  Results from last 7 days   Lab Units 06/27/23  0624   WBC Thousand/uL 2.29*   HEMOGLOBIN g/dL 8.3*   HEMATOCRIT % 26.7*   PLATELETS Thousands/uL 105*   NEUTROS PCT % 53   LYMPHS PCT % 22   MONOS PCT % 12   EOS PCT % 12*     Results from last 7 days   Lab Units 06/27/23  0624   SODIUM mmol/L 140   POTASSIUM mmol/L 3.7   CHLORIDE mmol/L 107   CO2 mmol/L 26   BUN mg/dL 25   CREATININE mg/dL 1.70*   ANION GAP mmol/L 7   CALCIUM mg/dL 8.4   ALBUMIN g/dL 3.0*   TOTAL BILIRUBIN mg/dL 0.87   ALK PHOS U/L 177*   ALT U/L 28   AST U/L 43*   GLUCOSE RANDOM mg/dL 110         Results from last 7 days   Lab Units 06/27/23  1017 06/27/23  0704 06/26/23  2106 06/26/23  1608 06/26/23  1105 06/26/23  0726 06/25/23  2107 06/25/23  1530 06/25/23  1103 06/25/23  0717 06/24/23  2054 06/24/23  1541   POC GLUCOSE mg/dl 190* 104 203* 204* 194* 115 238* 254* 183* 100 225* 178*               Lines/Drains:  Invasive Devices     Peripherally Inserted Central Catheter Line  Duration           PICC Line 37/91/27 Right Basilic 12 days                Central Line:  Goal for removal: N/A - Chronic PICC             Imaging: No pertinent imaging reviewed.     Recent Cultures (last 7 days):         Last 24 Hours Medication List:   Current Facility-Administered Medications   Medication Dose Route Frequency Provider Last Rate   • calamine-zinc oxide   Topical 4x Daily Sigrid Webster MD     • diphenhydrAMINE-zinc acetate   Topical TID PRN Neli Le MD     • fluconazole  200 mg Oral Q24H Ad Lopez PA-C     • glimepiride  2 mg Oral Daily With Breakfast Masha Chu MD     • heparin (porcine)  5,000 Units Subcutaneous [facility] Jorge L Yan PA-C     • insulin glargine  20 Units Subcutaneous HS Masha Chu MD     • insulin lispro  1-5 Units Subcutaneous HS Jorge L Yan PA-C     • insulin lispro  2-12 Units Subcutaneous TID AC Lou Choe PA-C     • oxyCODONE  10 mg Oral Q6H PRN Jorge L Yan PA-C     • tamsulosin  0.4 mg Oral Daily With Hugh Chase MD     • traZODone  50 mg Oral HS PRN Jorge L Yan PA-C     • vancomycin  15 mg/kg (Adjusted) Intravenous Daily PRN AMOR Correa     • vancomycin  15 mg/kg (Adjusted) Intravenous Once Galilea Villafuerte MD          Today, Patient Was Seen By: Hoa José PA-C    **Please Note: This note may have been constructed using a voice recognition system. **

## 2023-06-27 NOTE — PROGRESS NOTES
Progress Note - Nephrology   Radha Kunz 69 y.o. male MRN: [id number]  Unit/Bed#: S -01 Encounter: [id number]    75-year-old male with past medical history of hypertension, type 2 diabetes, alcoholic liver cirrhosis, recently discharged from [address] [place] [address] on 6/19 after he have had left shoulder prostatic infection now status post left shoulder resection and revision/arthroplasty and insertion of antibiotic spacer 6/13.  Patient was discharged on daptomycin.  Creatinine on discharge on 6/19 was 0.9.  Patient presented to [facility] the night after he was discharged because of decreased urine output.  Creatinine was noted to be 2.29.  Patient was suspected to have ABILIO secondary to acute urinary retention, Tejeda was placed on presentation however his creatinine increased since.  CT abdomen pelvis on presentation showed no hydronephrosis or calculi, mild bilateral perinephric stranding that can be seen in the setting of renal insufficiency, enlarged prostate, urinary bladder decompressed by Tejeda catheter. Also UA was unremarkable.     Assessment:  1. Acute on chronic kidney injury  • Baseline creatinine is 0.9-1.2. • Creatinine on admission 2.29, peaked to 4.78 on 6/22/23. • Initially etiology was felt to be urinary retention however creatinine did not improve with Tejeda catheter. Most likely ATN but unclear etiology for it. Suspicion for daptomycin reaction. Daptomycin was discontinued on 6/21 and patient was started on vancomycin instead. • Patient's creatinine has been improving , it decreased to 1.7 this morning. • Has post ATN diuresis, improved in the past 24 h(2.3L urine) , electrolytes wnl      2. Hypertension   • Blood pressure controlled.   • Patient's blood pressure meds held since admission  • Was on diltiazem 180 mg daily, losartan 100 and Dyazide daily.      3.  Left shoulder prosthetic joint infection  • Currently on IV vancomycin through 7/28  • Managed per ID  • Also patient has fungal irritation on the skin was started on fluconazole oral for 7 days      4. Diabetes mellitus type 2- per SLIM      5. Urine retention   • Tejeda was removed on 6/25  • Urinating well     6. Lower extremity edema  · Improved   · LVEF 60%, DD1, mild AS   · Was on dyazide as outpatient, held since admission   · Will need diuretic for LE edema on discharge      Plan:  • Improving renal function  • Patient can be discharged home from nephrology standpoint and follow-up outpatient  • Avoid losartan on discharge. • Can be started on low dose diuretic on discharge, for lower extremity edema      Will discuss with my attending about final plan     Subjective:   Patient is feeling well     Objective:     Vitals: Blood pressure 142/58, pulse 80, temperature 98.3 °F (36.8 °C), resp. rate 19, weight 116 kg (256 lb 13.4 oz), SpO2 100 %. ,Body mass index is 35.82 kg/m². Weight (last 2 days)     Date/Time Weight    06/27/23 0600 116 (256.84)    06/26/23 0600 118 (260.14)    06/25/23 0600 118 (259.5)            Intake/Output Summary (Last 24 hours) at 6/27/2023 0944  Last data filed at 6/26/2023 2109  Gross per 24 hour   Intake --   Output 2300 ml   Net -2300 ml            Physical Exam  Vitals reviewed. Constitutional:       General: He is not in acute distress. Appearance: He is obese. He is not diaphoretic. Eyes:      General: No scleral icterus. Right eye: No discharge. Left eye: No discharge. Cardiovascular:      Rate and Rhythm: Normal rate and regular rhythm. Heart sounds: Murmur heard. Pulmonary:      Effort: Pulmonary effort is normal. No respiratory distress. Breath sounds: Normal breath sounds. No wheezing or rales. Abdominal:      General: There is no distension. Palpations: Abdomen is soft. Tenderness: There is no abdominal tenderness. There is no guarding or rebound. Musculoskeletal:      Right lower leg: Edema present. Left lower leg: Edema present. Skin:     General: Skin is warm. Neurological:      Mental Status: He is alert and oriented to person, place, and time. Psychiatric:         Mood and Affect: Mood normal.         Behavior: Behavior normal.         Thought Content:  Thought content normal.         Judgment: Judgment normal.         Lab, Imaging and other studies:   CBC:   Lab Results   Component Value Date    WBC 2.29 (L) 06/27/2023    HGB 8.3 (L) 06/27/2023    HCT 26.7 (L) 06/27/2023    MCV 87 06/27/2023     (L) 06/27/2023    RBC 3.08 (L) 06/27/2023    MCH 26.9 06/27/2023    MCHC 31.1 (L) 06/27/2023    RDW 15.8 (H) 06/27/2023    MPV 12.3 06/27/2023    NRBC 0 06/27/2023     CMP:   Lab Results   Component Value Date    SODIUM 140 06/27/2023    K 3.7 06/27/2023     06/27/2023    CO2 26 06/27/2023    BUN 25 06/27/2023    CREATININE 1.70 (H) 06/27/2023    CALCIUM 8.4 06/27/2023    AST 43 (H) 06/27/2023    ALT 28 06/27/2023    ALKPHOS 177 (H) 06/27/2023    EGFR 40 06/27/2023

## 2023-06-27 NOTE — TELEPHONE ENCOUNTER
----- Message from Collette Longs sent at 6/27/2023 10:21 AM EDT -----  Patient called and stated he has been admitted to [facility] and he said on his last office visit he had with Dr Vince Thompson, he was supposed order some blood work I didn't see any orders please review and advice thank you

## 2023-06-27 NOTE — PROGRESS NOTES
Shanell Landaverde is a 69 y.o. male who is currently ordered Vancomycin IV with management by the Pharmacy Consult service. Relevant clinical data and objective / subjective history reviewed. Vancomycin Assessment Addendum:  Indication and Goal AUC/Trough: Bone/joint infection (goal -600, trough >10), -600, trough >10    Renal Function:  SCr:  1.7 mg/dL  CrCl: 53.4 mL/min  Due to improvement in renal function will adjust patient to scheduled dosing. Current Dose: 1500mg IV daily PRN when level <15  Vancomycin Plan:  New Dosinmg IV q24h  Estimated AUC: 516 mcg*hr/mL  Estimated Trough: 14.7 mcg/mL  Next Level:  random with AM labs  Renal Function Monitoring: Daily BMP and [place] will continue to follow closely for s/sx of nephrotoxicity, infusion reactions and appropriateness of therapy. BMP and CBC will be ordered per protocol. We will continue to follow the patient’s culture results and clinical progress daily.     Jaya Salazar, Pharmacist

## 2023-06-27 NOTE — PROGRESS NOTES
Progress Note - Infectious Disease   Mehrdad Field 69 y.o. male MRN: [id number]  Unit/Bed#: S -01 Encounter: [id number]    Impression/Plan:  1. 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 Patient initial arthroplasty on 4/4/2023. Post operatively he developed purulent weeping from the site and shoulder imaging revealed a fluid collection communicating to the joint space. Cultures from I&D 5/2/2023 grew coagulase-negative staph and alphahemolytic strep in broth only, as well as growth of cutibacterium acnes and avidum. He then underwent L shoulder resection arthroplasty with insertion of antibiotic spacer on 6/13/2023. OR cultures grew Cutibacterium avidum, MRSE, and micrococcus luteus. PICC was placed and patient was placed on Daptomycin to complete a 6-week course of IV antibiotics after antibiotic spacer, through 7/28/2023. Patient now back at hospital with ABILIO with concern for ATN and renal failure. Consider role of antibiotic in renal function change. Patient has been transitioned to IV Vancomycin and he is tolerating this antibiotic without difficulty.  I will continue this for now.  -continue IV vancomycin through 7/28/2023 for 6 weeks of IV antibiotic treatment  -continue pharmacy consult for guidance on vancomycin dosage  -home vancomycin recommendation is 2g q24, , and goal trough 11.4-17.2  -will begin planning for transition to home antibiotic plan now that renal function has improved  -weekly CBCD, creatinine, and vancomycin trough while on IV antibiotic  -monitor vitals  -serial L shoulder exams  -surgical site care per orthopedic surgery  -continue follow up with orthopedic surgery  -RUE PICC to be removed by home RN after final dose of IV antibiotic on 7/28/2023  -script for IV antibiotic and lab work provided to case management on 6/27/2023  -patient to follow in the outpatient ID office after discharge, appointment is on 7/3/2023 at 9:30 with  Doyle     2. ABILIO. Upon review of patient's available medical records it appears his baseline creatinine is approximately 1-1.2 Creatinine was 2.29 upon admission. Then increased further today to 4.78. Tejeda temporarily placed for accurate I&O and in setting of intermittent retention. Nephrology is follow closely. There is concern for ATN. Creatinine has improved to 1.7 this morning.  -antibiotic now changed as above  -volume management per primary service/nephrology   -monitor creatinine  -dose adjust antibiotic for renal function as needed  -monitor urine output  -continue close follow up with nephrology     3. Chronic leukopenia and thrombocytopenia. Likely due to patient's alcoholic cirrhosis. CBCD currently stable.  -resume outpatient GI and hematology follow up with [facility] after discharge     4. Type 2 diabetes mellitus with long term insulin use. Patient's last HbA1c was 8.4% on 3/20/2023. Elevated blood glucose is risk factor for infection and complications with healing. Recommend tight glycemic control  -blood glucose management per primary service     5. Obesity. BMI = 35.82.   -dose adjust antibiotics for patient weight as needed      6. Antibiotic allergy. Patient reports hives with sulfa antibiotics. We will avoid this drug class for now.  -monitor patient for adverse medication reactions     7. Fungal rash. Patient with yeast extending past the distal end of his wound incision. Suspect this is secondary to the moisture he's had under his dressings. Orthopedics recommending against topical treatment so patient has been started on oral Fluconazole. Anticipate 7-day treatment.  -continue oral Fluconazole to finish 7 days of treatment    Patient is stable for discharge from ID standpoint once home antibiotic treatment is coordinated. Above plan was discussed in detail with patient at the bedside. Above plan was discussed in detail with primary service.     Antibiotics/Antifungals:  Vancomycin 6  Antibiotics 16  Fluconazole 2  Antifungals 2    Subjective:  Patient reports a lot of concern with his L shoulder incision and how it is being managed. Has no concerns with pain within the joint but is worried about ongoing drainage. He has no PICC complaints and understands plan for home antibiotics. He has no fever, chills, sweats, shakes; no nausea, vomiting, abdominal pain, diarrhea, or dysuria; no cough, shortness of breath, or chest pain. No new symptoms. Objective:  Vitals:  Temp:  [97.3 °F (36.3 °C)-99 °F (37.2 °C)] 99 °F (37.2 °C)  HR:  [77-89] 81  Resp:  [16-17] 16  BP: (111-134)/(52-65) 111/60  SpO2:  [98 %-100 %] 100 %  Temp (24hrs), Av.3 °F (36.8 °C), Min:97.3 °F (36.3 °C), Max:99 °F (37.2 °C)  Current: Temperature: 99 °F (37.2 °C)    Physical Exam:   General Appearance:  Alert, interactive, nontoxic, no acute distress. He appears comfortable sitting out of bed in the chair. Lungs:   Clear to auscultation bilaterally; no wheezes, rhonchi or rales; respirations unlabored on room air. Heart:  RRR; no murmur, rub or gallop. Extremities: RUE PICC intact. L shoulder incision with serosanguinous weeping from distal incision, crusting of the distal sutures notes. L distal incision site with surrounding flat red rash extending down towards the elbow and into axilla creases. Skin: No new lesions or draining wounds noted on exposed skin.      Labs, Imaging, & Other studies:   All pertinent labs and imaging studies were personally reviewed  Results from last 7 days   Lab Units 23  0624 23  0451 23  0451   WBC Thousand/uL 2.29* 2.56* 3.53*   HEMOGLOBIN g/dL 8.3* 8.0* 8.0*   PLATELETS Thousands/uL 105* 89* 86*     Results from last 7 days   Lab Units 23  0624 23  1653 23  0542   POTASSIUM mmol/L 3.7   < > 5.9*   CHLORIDE mmol/L 107   < > 109*   CO2 mmol/L 26   < > 19*   BUN mg/dL 25   < > 52*   CREATININE mg/dL 1.70*   < > 4.66*   EGFR ml/min/1.73sq m 40   < > 11 CALCIUM mg/dL 8.4   < > 8.2*   AST U/L 43*  --  61*   ALT U/L 28  --  24   ALK PHOS U/L 177*  --  167*    < > = values in this interval not displayed.

## 2023-06-27 NOTE — TELEPHONE ENCOUNTER
SPOKE WITH PT, CURRENTLY ADMITTED AT [facility], REQUESTING LAB PRESCRIPTIONS FOR UPCOMING OFFICE VISIT WITH DR CASILLAS, PT INFORMED ACTIVE BLOOD WORK ORDERS ARE IN Epic FROM 3/27/23, CAN BE DONE WHILE INPATIENT IF HOSPITALIST IS AGREEABLE OR CAN BE DONE OUTPT PRIOR TO APPT  PT AGREEABLE WITH PLAN, ALL QUESTIONS ANSWERED

## 2023-06-27 NOTE — PLAN OF CARE
Problem: PAIN - ADULT  Goal: Verbalizes/displays adequate comfort level or baseline comfort level  Description: Interventions:  - Encourage patient to monitor pain and request assistance  - Assess pain using appropriate pain scale  - Administer analgesics based on type and severity of pain and evaluate response  - Implement non-pharmacological measures as appropriate and evaluate response  - Consider cultural and social influences on pain and pain management  - Notify physician/advanced practitioner if interventions unsuccessful or patient reports new pain  Outcome: Progressing     Problem: INFECTION - ADULT  Goal: Absence or prevention of progression during hospitalization  Description: INTERVENTIONS:  - Assess and monitor for signs and symptoms of infection  - Monitor lab/diagnostic results  - Monitor all insertion sites, i.e. indwelling lines, tubes, and drains  - Monitor endotracheal if appropriate and nasal secretions for changes in amount and color  - Ulysses appropriate cooling/warming therapies per order  - Administer medications as ordered  - Instruct and encourage patient and family to use good hand hygiene technique  - Identify and instruct in appropriate isolation precautions for identified infection/condition  Outcome: Progressing     Problem: SAFETY ADULT  Goal: Patient will remain free of falls  Description: INTERVENTIONS:  - Educate patient/family on patient safety including physical limitations  - Instruct patient to call for assistance with activity   - Consult OT/PT to assist with strengthening/mobility   - Keep Call bell within reach  - Keep bed low and locked with side rails adjusted as appropriate  - Keep care items and personal belongings within reach  - Initiate and maintain comfort rounds  - Make Fall Risk Sign visible to staff  - Apply yellow socks and bracelet for high fall risk patients  - Consider moving patient to room near nurses station  Outcome: Progressing  Goal: Maintain or return to baseline ADL function  Description: INTERVENTIONS:  -  Assess patient's ability to carry out ADLs; assess patient's baseline for ADL function and identify physical deficits which impact ability to perform ADLs (bathing, care of mouth/teeth, toileting, grooming, dressing, etc.)  - Assess/evaluate cause of self-care deficits   - Assess range of motion  - Assess patient's mobility; develop plan if impaired  - Assess patient's need for assistive devices and provide as appropriate  - Encourage maximum independence but intervene and supervise when necessary  - Involve family in performance of ADLs  - Assess for home care needs following discharge   - Consider OT consult to assist with ADL evaluation and planning for discharge  - Provide patient education as appropriate  Outcome: Progressing  Goal: Maintains/Returns to pre admission functional level  Description: INTERVENTIONS:  - Perform BMAT or MOVE assessment daily.   - Set and communicate daily mobility goal to care team and patient/family/caregiver.    - Collaborate with rehabilitation services on mobility goals if consulted  - Out of bed for toileting  - Record patient progress and toleration of activity level   Outcome: Progressing     Problem: DISCHARGE PLANNING  Goal: Discharge to home or other facility with appropriate resources  Description: INTERVENTIONS:  - Identify barriers to discharge w/patient and caregiver  - Arrange for needed discharge resources and transportation as appropriate  - Identify discharge learning needs (meds, wound care, etc.)  - Arrange for interpretive services to assist at discharge as needed  - Refer to Case Management Department for coordinating discharge planning if the patient needs post-hospital services based on physician/advanced practitioner order or complex needs related to functional status, cognitive ability, or social support system  Outcome: Progressing     Problem: GENITOURINARY - ADULT  Goal: Maintains or returns to baseline urinary function  Description: INTERVENTIONS:  - Assess urinary function  - Encourage oral fluids to ensure adequate hydration if ordered  - Administer IV fluids as ordered to ensure adequate hydration  - Administer ordered medications as needed  - Offer frequent toileting  - Follow urinary retention protocol if ordered  Outcome: Progressing  Goal: Absence of urinary retention  Description: INTERVENTIONS:  - Assess patient’s ability to void and empty bladder  - Monitor I/O  - Bladder scan as needed  - Discuss with physician/AP medications to alleviate retention as needed  - Discuss catheterization for long term situations as appropriate  Outcome: Progressing  Goal: Urinary catheter remains patent  Description: INTERVENTIONS:  - Assess patency of urinary catheter  - If patient has a chronic cadena, consider changing catheter if non-functioning  - Follow guidelines for intermittent irrigation of non-functioning urinary catheter  Outcome: Progressing     Problem: MOBILITY - ADULT  Goal: Maintain or return to baseline ADL function  Description: INTERVENTIONS:  -  Assess patient's ability to carry out ADLs; assess patient's baseline for ADL function and identify physical deficits which impact ability to perform ADLs (bathing, care of mouth/teeth, toileting, grooming, dressing, etc.)  - Assess/evaluate cause of self-care deficits   - Assess range of motion  - Assess patient's mobility; develop plan if impaired  - Assess patient's need for assistive devices and provide as appropriate  - Encourage maximum independence but intervene and supervise when necessary  - Involve family in performance of ADLs  - Assess for home care needs following discharge   - Consider OT consult to assist with ADL evaluation and planning for discharge  - Provide patient education as appropriate  Outcome: Progressing  Goal: Maintains/Returns to pre admission functional level  Description: INTERVENTIONS:  - Perform BMAT or MOVE assessment daily.   - Set and communicate daily mobility goal to care team and patient/family/caregiver.    - Collaborate with rehabilitation services on mobility goals if consulted  - Out of bed for toileting  - Record patient progress and toleration of activity level   Outcome: Progressing     Problem: Prexisting or High Potential for Compromised Skin Integrity  Goal: Skin integrity is maintained or improved  Description: INTERVENTIONS:  - Identify patients at risk for skin breakdown  - Assess and monitor skin integrity  - Assess and monitor nutrition and hydration status  - Monitor labs   - Assess for incontinence   - Turn and reposition patient  - Assist with mobility/ambulation  - Relieve pressure over bony prominences  - Avoid friction and shearing  - Provide appropriate hygiene as needed including keeping skin clean and dry  - Evaluate need for skin moisturizer/barrier cream  - Collaborate with interdisciplinary team   - Patient/family teaching  - Consider wound care consult   Outcome: Progressing

## 2023-06-28 ENCOUNTER — TELEPHONE (OUTPATIENT)
Dept: NEPHROLOGY | Facility: CLINIC | Age: 68
End: 2023-06-28

## 2023-06-28 VITALS
SYSTOLIC BLOOD PRESSURE: 115 MMHG | TEMPERATURE: 98.2 F | WEIGHT: 254.85 LBS | HEART RATE: 85 BPM | BODY MASS INDEX: 35.54 KG/M2 | RESPIRATION RATE: 18 BRPM | DIASTOLIC BLOOD PRESSURE: 66 MMHG | OXYGEN SATURATION: 99 %

## 2023-06-28 DIAGNOSIS — N17.9 AKI (ACUTE KIDNEY INJURY) (HCC): ICD-10-CM

## 2023-06-28 DIAGNOSIS — N18.31 CKD STAGE G3A/A1, GFR 45-59 AND ALBUMIN CREATININE RATIO <30 MG/G (HCC): Primary | ICD-10-CM

## 2023-06-28 DIAGNOSIS — N18.30 BENIGN HYPERTENSION WITH CHRONIC KIDNEY DISEASE, STAGE III (HCC): ICD-10-CM

## 2023-06-28 DIAGNOSIS — I12.9 BENIGN HYPERTENSION WITH CHRONIC KIDNEY DISEASE, STAGE III (HCC): ICD-10-CM

## 2023-06-28 PROBLEM — R60.0 BILATERAL LOWER EXTREMITY EDEMA: Status: RESOLVED | Noted: 2023-06-20 | Resolved: 2023-06-28

## 2023-06-28 LAB
ALBUMIN SERPL BCP-MCNC: 3 G/DL (ref 3.5–5)
ALP SERPL-CCNC: 175 U/L (ref 34–104)
ALT SERPL W P-5'-P-CCNC: 27 U/L (ref 7–52)
ANION GAP SERPL CALCULATED.3IONS-SCNC: 5 MMOL/L
AST SERPL W P-5'-P-CCNC: 40 U/L (ref 13–39)
BASOPHILS # BLD AUTO: 0.02 THOUSANDS/ÂΜL (ref 0–0.1)
BASOPHILS NFR BLD AUTO: 1 % (ref 0–1)
BILIRUB SERPL-MCNC: 0.72 MG/DL (ref 0.2–1)
BUN SERPL-MCNC: 23 MG/DL (ref 5–25)
CALCIUM ALBUM COR SERPL-MCNC: 9.1 MG/DL (ref 8.3–10.1)
CALCIUM SERPL-MCNC: 8.3 MG/DL (ref 8.4–10.2)
CHLORIDE SERPL-SCNC: 109 MMOL/L (ref 96–108)
CO2 SERPL-SCNC: 26 MMOL/L (ref 21–32)
CREAT SERPL-MCNC: 1.59 MG/DL (ref 0.6–1.3)
EOSINOPHIL # BLD AUTO: 0.29 THOUSAND/ÂΜL (ref 0–0.61)
EOSINOPHIL NFR BLD AUTO: 13 % (ref 0–6)
ERYTHROCYTE [DISTWIDTH] IN BLOOD BY AUTOMATED COUNT: 15.5 % (ref 11.6–15.1)
GFR SERPL CREATININE-BSD FRML MDRD: 43 ML/MIN/1.73SQ M
GLUCOSE SERPL-MCNC: 100 MG/DL (ref 65–140)
GLUCOSE SERPL-MCNC: 192 MG/DL (ref 65–140)
GLUCOSE SERPL-MCNC: 79 MG/DL (ref 65–140)
HCT VFR BLD AUTO: 26.8 % (ref 36.5–49.3)
HGB BLD-MCNC: 8.2 G/DL (ref 12–17)
IMM GRANULOCYTES # BLD AUTO: 0.02 THOUSAND/UL (ref 0–0.2)
IMM GRANULOCYTES NFR BLD AUTO: 1 % (ref 0–2)
LYMPHOCYTES # BLD AUTO: 0.48 THOUSANDS/ÂΜL (ref 0.6–4.47)
LYMPHOCYTES NFR BLD AUTO: 21 % (ref 14–44)
MCH RBC QN AUTO: 26.4 PG (ref 26.8–34.3)
MCHC RBC AUTO-ENTMCNC: 30.6 G/DL (ref 31.4–37.4)
MCV RBC AUTO: 86 FL (ref 82–98)
MONOCYTES # BLD AUTO: 0.23 THOUSAND/ÂΜL (ref 0.17–1.22)
MONOCYTES NFR BLD AUTO: 10 % (ref 4–12)
NEUTROPHILS # BLD AUTO: 1.26 THOUSANDS/ÂΜL (ref 1.85–7.62)
NEUTS SEG NFR BLD AUTO: 54 % (ref 43–75)
NRBC BLD AUTO-RTO: 0 /100 WBCS
PLATELET # BLD AUTO: 101 THOUSANDS/UL (ref 149–390)
PMV BLD AUTO: 12.6 FL (ref 8.9–12.7)
POTASSIUM SERPL-SCNC: 3.5 MMOL/L (ref 3.5–5.3)
PROT SERPL-MCNC: 6.1 G/DL (ref 6.4–8.4)
RBC # BLD AUTO: 3.11 MILLION/UL (ref 3.88–5.62)
SODIUM SERPL-SCNC: 140 MMOL/L (ref 135–147)
WBC # BLD AUTO: 2.3 THOUSAND/UL (ref 4.31–10.16)

## 2023-06-28 PROCEDURE — 99239 HOSP IP/OBS DSCHRG MGMT >30: CPT | Performed by: PHYSICIAN ASSISTANT

## 2023-06-28 PROCEDURE — 85025 COMPLETE CBC W/AUTO DIFF WBC: CPT | Performed by: PHYSICIAN ASSISTANT

## 2023-06-28 PROCEDURE — 99024 POSTOP FOLLOW-UP VISIT: CPT | Performed by: PHYSICIAN ASSISTANT

## 2023-06-28 PROCEDURE — 80053 COMPREHEN METABOLIC PANEL: CPT | Performed by: PHYSICIAN ASSISTANT

## 2023-06-28 PROCEDURE — 82948 REAGENT STRIP/BLOOD GLUCOSE: CPT

## 2023-06-28 PROCEDURE — 99232 SBSQ HOSP IP/OBS MODERATE 35: CPT | Performed by: INTERNAL MEDICINE

## 2023-06-28 PROCEDURE — 99232 SBSQ HOSP IP/OBS MODERATE 35: CPT | Performed by: NURSE PRACTITIONER

## 2023-06-28 RX ORDER — TAMSULOSIN HYDROCHLORIDE 0.4 MG/1
0.4 CAPSULE ORAL
Qty: 30 CAPSULE | Refills: 0 | Status: SHIPPED | OUTPATIENT
Start: 2023-06-28 | End: 2023-07-28

## 2023-06-28 RX ORDER — FUROSEMIDE 20 MG/1
20 TABLET ORAL EVERY OTHER DAY
Qty: 15 TABLET | Refills: 0 | Status: SHIPPED | OUTPATIENT
Start: 2023-06-28 | End: 2023-07-28

## 2023-06-28 RX ORDER — INSULIN GLARGINE 100 [IU]/ML
20 INJECTION, SOLUTION SUBCUTANEOUS
Qty: 10 ML | Refills: 0
Start: 2023-06-28

## 2023-06-28 RX ORDER — FLUCONAZOLE 200 MG/1
200 TABLET ORAL EVERY 24 HOURS
Qty: 5 TABLET | Refills: 0 | Status: SHIPPED | OUTPATIENT
Start: 2023-06-28 | End: 2023-07-03

## 2023-06-28 RX ADMIN — VANCOMYCIN HYDROCHLORIDE 2000 MG: 10 INJECTION, POWDER, LYOPHILIZED, FOR SOLUTION INTRAVENOUS at 10:44

## 2023-06-28 RX ADMIN — GLIMEPIRIDE 2 MG: 2 TABLET ORAL at 08:10

## 2023-06-28 RX ADMIN — FUROSEMIDE 20 MG: 20 TABLET ORAL at 08:10

## 2023-06-28 RX ADMIN — HEPARIN SODIUM 5000 UNITS: 5000 INJECTION INTRAVENOUS; SUBCUTANEOUS at 05:45

## 2023-06-28 RX ADMIN — INSULIN LISPRO 2 UNITS: 100 INJECTION, SOLUTION INTRAVENOUS; SUBCUTANEOUS at 11:41

## 2023-06-28 NOTE — PROGRESS NOTES
Progress Note - Infectious Disease   Malorie Rough 69 y.o. male MRN: [id number]  Unit/Bed#: S -01 Encounter: [id number]    Impression/Plan:  1. 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 Patient initial arthroplasty on 4/4/2023. Post operatively he developed purulent weeping from the site and shoulder imaging revealed a fluid collection communicating to the joint space. Cultures from I&D 5/2/2023 grew coagulase-negative staph and alphahemolytic strep in broth only, as well as growth of cutibacterium acnes and avidum. He then underwent L shoulder resection arthroplasty with insertion of antibiotic spacer on 6/13/2023. OR cultures grew Cutibacterium avidum, MRSE, and micrococcus luteus. PICC was placed and patient was placed on Daptomycin to complete a 6-week course of IV antibiotics after antibiotic spacer, through 7/28/2023. Patient now back at hospital with ABILIO with concern for ATN and renal failure. Consider role of antibiotic in renal function change. Patient has been transitioned to IV Vancomycin and he is tolerating this antibiotic without difficulty. I will continue this for now.  -continue IV vancomycin through 7/28/2023 for 6 weeks of IV antibiotic treatment  -continue pharmacy consult for guidance on vancomycin dosage  -home vancomycin recommendation is 2g q24, , and goal trough 11.4-17. 2  -weekly CBCD, creatinine, and vancomycin trough while on IV antibiotic, VNA to begin labs on monday  -monitor vitals  -serial L shoulder exams  -surgical site care per orthopedic surgery  -continue follow up with orthopedic surgery  -RUE PICC to be removed by home RN after final dose of IV antibiotic on 7/28/2023  -script for IV antibiotic and lab work provided to case management on 6/27/2023  -patient to follow in the outpatient ID office after discharge, appointment is on 7/3/2023 at 9:30 with Dr Dottie Rivera     2. ABILIO.  Upon review of patient's available medical records it appears his baseline creatinine is approximately 1-1.2 Creatinine was 2.29 upon admission. Then increased further today to 4.78. Tejeda temporarily placed for accurate I&O and in setting of intermittent retention. Nephrology is follow closely. There is concern for ATN. Creatinine has improved to 1.59 this morning.  -antibiotic adjusted as above and dosed for renal function  -volume management per primary service/nephrology   -monitor creatinine  -dose adjust antibiotic for renal function as needed  -monitor urine output  -continue close follow up with nephrology     3. Chronic leukopenia and thrombocytopenia. Likely due to patient's alcoholic cirrhosis. CBCD currently stable.  -resume outpatient GI and hematology follow up with [facility] after discharge     4. Type 2 diabetes mellitus with long term insulin use. Patient's last HbA1c was 8.4% on 3/20/2023. Elevated blood glucose is risk factor for infection and complications with healing. Recommend tight glycemic control  -blood glucose management per primary service     5. Obesity. BMI = 35.54.   -dose adjust antibiotics for patient weight as needed      6. Antibiotic allergy. Patient reports hives with sulfa antibiotics. We will avoid this drug class for now.  -monitor patient for adverse medication reactions     7. Fungal rash. Patient with yeast extending past the distal end of his wound incision. Suspect this is secondary to the moisture he's had under his dressings. Orthopedics recommending against topical treatment near the incision site so patient has been started on oral Fluconazole. Anticipate 7-day treatment.  -continue oral Fluconazole to finish 7 days of treatment     Patient is stable for discharge from ID standpoint. Above plan was discussed in detail with patient at the bedside. Above plan was discussed in detail with primary service. I spent 40 minutes on the unit floor of which 25 minutes was in counseling/coordination of care as outlined in my note. Patient questions answered. Above plan was discussed and coordinated with primary service. Antimicrobials:  Vancomycin 7  Antibiotics 17  Fluconazole 3  Antfungals 3    Subjective:  Patient reports he's doing well today, is ready to go home after his next dose of Vancomycin. Has no concerns with his PICC. He met with orthopedics this morning who removed his sutures and applied new dry dressing to wound. He's been instructed to keep the site dry and change his dressing daily. He has no fever, chills, sweats, shakes; no nausea, vomiting, abdominal pain; no cough, shortness of breath, or chest pain. No new symptoms. Objective:  Vitals:  Temp:  [97.6 °F (36.4 °C)-98.8 °F (37.1 °C)] 98.2 °F (36.8 °C)  HR:  [68-85] 85  Resp:  [17-19] 18  BP: (115-145)/(52-69) 115/66  SpO2:  [99 %-100 %] 99 %  Temp (24hrs), Av.2 °F (36.8 °C), Min:97.6 °F (36.4 °C), Max:98.8 °F (37.1 °C)  Current: Temperature: 98.2 °F (36.8 °C)    Physical Exam:   General Appearance:  Alert, interactive, nontoxic, no acute distress. He is wearing his glasses. He appears comfortable out of bed in his chair. Throat: Oropharynx moist without lesions. Lungs:   Clear to auscultation bilaterally; no wheezes, rhonchi or rales; respirations unlabored on room air. Heart:  RRR; no murmur, rub or gallop. Extremities: L shoulder/upper arm with new clean dressing.      Labs, Imaging, & Other studies:   All pertinent labs and imaging studies were personally reviewed  Results from last 7 days   Lab Units 23  0428 23  0624 23  0451   WBC Thousand/uL 2.30* 2.29* 2.56*   HEMOGLOBIN g/dL 8.2* 8.3* 8.0*   PLATELETS Thousands/uL 101* 105* 89*     Results from last 7 days   Lab Units 23  0428 23  0624 23  1653 23  0542   POTASSIUM mmol/L 3.5 3.7   < > 5.9*   CHLORIDE mmol/L 109* 107   < > 109*   CO2 mmol/L 26 26   < > 19*   BUN mg/dL 23 25   < > 52*   CREATININE mg/dL 1.59* 1.70*   < > 4.66*   EGFR ml/min/1.73sq m 43 40   < > 11   CALCIUM mg/dL 8.3* 8.4   < > 8.2*   AST U/L 40* 43*  --  61*   ALT U/L 27 28  --  24   ALK PHOS U/L 175* 177*  --  167*    < > = values in this interval not displayed.

## 2023-06-28 NOTE — ASSESSMENT & PLAN NOTE
· Patient is s/p reverse total shoulder arthoplasty on 4/423 and s/p I&D of left shoulder on 5/2/23 with recent admission at [facility] for left shoulder surgical site infection with abscess and possible prosthetic infection and is now s/p explantation and spacer placement on 6/13/23 with cultures growing cutibacterium avidum and staphylococcus epidermidis. · Discharged from [facility] on 6/19/23 on IV daptomycin 700 mg q24h for 6 weeks (until 7/28/23). · Patient now switched to IV vancomycin from daptomycin because of concern for acute kidney injury. continue IV vancomycin through 7/28/2023 for 6 weeks of IV antibiotic treatment  · Presently on p.o. fluconazole through 7/1 for fungal infection  · Vancomycin dosing as per pharmacy. Still creatinine improving and hence the final dosing for vancomycin has not been achieved.    · Will need VNA upon discharge, case management to assist.

## 2023-06-28 NOTE — PROGRESS NOTES
Progress Note - Orthopedics   Vanessa Gupta 69 y.o. male MRN: [id number]  Unit/Bed#: S -01      Subjective:    69 y.o.male seen and examined at bedside. No significant shoulder pain. Notes that the rash at the distal margin of his surgical wound has improved. No significant drainage noted from wound this AM. Overall feeling well. He is anticipating possible discharge today.      Labs:  0   Lab Value Date/Time    HCT 26.8 (L) 06/28/2023 0428    HCT 26.7 (L) 06/27/2023 0624    HCT 25.6 (L) 06/24/2023 0451    HGB 8.2 (L) 06/28/2023 0428    HGB 8.3 (L) 06/27/2023 0624    HGB 8.0 (L) 06/24/2023 0451    INR 1.08 06/12/2023 1703    WBC 2.30 (L) 06/28/2023 0428    WBC 2.29 (L) 06/27/2023 0624    WBC 2.56 (L) 06/24/2023 0451    ESR 85 (H) 06/11/2023 1438    CRP 27.0 (H) 06/11/2023 1438       Meds:    Current Facility-Administered Medications:   •  calamine-zinc oxide lotion, , Topical, 4x Daily, Dheeraj Francisco MD  •  diphenhydrAMINE-zinc acetate (BENADRYL) 2-0.1 % cream, , Topical, TID PRN, Letty West MD, Given at 06/23/23 1447  •  fluconazole (DIFLUCAN) tablet 200 mg, 200 mg, Oral, Q24H, Brandee Farrell PA-C, 200 mg at 06/27/23 1510  •  furosemide (LASIX) tablet 20 mg, 20 mg, Oral, Daily, Kait Jensen MD, 20 mg at 06/28/23 0810  •  glimepiride (AMARYL) tablet 2 mg, 2 mg, Oral, Daily With Breakfast, Letty West MD, 2 mg at 06/28/23 0810  •  heparin (porcine) subcutaneous injection 5,000 Units, 5,000 Units, Subcutaneous, Q8H [address], 5,000 Units at 06/28/23 0545 **AND** [CANCELED] Platelet count, , , Once, Valorie Briggs PA-C  •  insulin glargine (LANTUS) subcutaneous injection 20 Units 0.2 mL, 20 Units, Subcutaneous, HS, Letty West MD, 20 Units at 06/27/23 2214  •  insulin lispro (HumaLOG) 100 units/mL subcutaneous injection 1-5 Units, 1-5 Units, Subcutaneous, HS, Lou Resendiz PA-C, 1 Units at 06/27/23 2215  •  insulin lispro (HumaLOG) 100 units/mL subcutaneous injection 2-12 Units, 2-12 Units, Subcutaneous, TID AC, 2 Units at 06/27/23 1211 **AND** Fingerstick Glucose (POCT), , , TID AC, Lou Jeffery PA-C  •  oxyCODONE (ROXICODONE) immediate release tablet 10 mg, 10 mg, Oral, Q6H PRN, Shiv Decker PA-C, 10 mg at 06/27/23 2214  •  tamsulosin (FLOMAX) capsule 0.4 mg, 0.4 mg, Oral, Daily With Ana Mccauley MD, 0.4 mg at 06/27/23 1729  •  traZODone (DESYREL) tablet 50 mg, 50 mg, Oral, HS PRN, Shiv Decker PA-C, 50 mg at 06/27/23 2214  •  vancomycin (VANCOCIN) 2,000 mg in sodium chloride 0.9 % 500 mL IVPB, 2,000 mg, Intravenous, Q24H, AMOR Singleton    Blood Culture:   Lab Results   Component Value Date    BLOODCX No Growth After 5 Days. 09/13/2022    BLOODCX No Growth After 5 Days. 09/13/2022       Wound Culture:   Lab Results   Component Value Date    WOUNDCULT 1+ Growth of Klebsiella oxytoca (A) 09/14/2022    WOUNDCULT 1+ Growth of 09/14/2022       Ins and Outs:  I/O last 24 hours: In: 250 [P.O.:220; I.V.:30]  Out: 3900 [Urine:3900]          Physical:  Vitals:    06/28/23 0630   BP: 115/66   Pulse: 85   Resp: 18   Temp: 98.2 °F (36.8 °C)   SpO2: 99%     Musculoskeletal: left Upper Extremity  • Surgical incision with sutures in place. Well approximated, no further drainage appreciated. There is a rash over the distal margin of incision that has been outlined. It is erythematous, with white patches. It has continued to improve. Scabbing removed with sterile saline. Sutures removed without incident. Steristrips applied. • Wound was recovered with ABD and window paned. • No significant surrounding ttp. • ROM not tested at the shoulder. • SILT m/r/u.   • Motor intact ain/pin/m/r/u  • 2+ radial and ulnar pulse  • Musculature is soft and compressible, no pain with passive stretch            Radiology  I have personally reviewed imaging studies  XRAYs left shoulder: expected post operative changes.      Assessment:    67 y.o.male s/p recent revision shoulder arthroplasty with explantation and spacer placement on 6/13/23 with Dr. Suzie Dancer. Also with what appears to be distal superficial fungal infection. Plan:  • NWB left upper extremity. • Ok for range of motion at the elbow and wrist.   • Ok for pendulums and passive range of motion at the shoulder. Discussed with patient in detail. • Ok for dressing changes as needed - dry dressings only. Please do not apply ointments or creams to wound. • Continue antibiotics per ID. Appreciate assistance. • Recommend management of possible fungal infection per ID and [facility]. Jorge Reeves • Medical management per primary team.   • No immediate orthopedic intervention indicated. • Rest of care per primary team.   • Dispo: Ortho stable. • Case was reviewed and discussed with patients primary orthopedic surgeon, Dr. Suzie Dancer  • Patient will need follow up with primary orthopedic surgeon, Dr. Suzie Dancer upon discharge.      Viday Rodarte PA-C

## 2023-06-28 NOTE — ASSESSMENT & PLAN NOTE
Lab Results   Component Value Date    HGBA1C 8.4 (H) 03/20/2023       Recent Labs     06/27/23  1017 06/27/23  1606 06/27/23  2132 06/28/23  0630   POCGLU 190* 142* 188* 79       Blood Sugar Average: Last 72 hrs:  (P) 187.5895707503687439   • Patient's Accucheck rising, appetite wnl. Resume Amaryl. • Monitor accu-checks AC and HS. • Continue Lantus 20 units with sliding scale coverage  • SSI coverage. • Hypoglycemia protocol.

## 2023-06-28 NOTE — PROGRESS NOTES
Progress Note - Nephrology   Gorge Diallo 69 y.o. male MRN: [id number]  Unit/Bed#: S -01 Encounter: [id number]    Assessment:  1. Acute on chronic kidney injury  • Baseline creatinine is 0.9-1.2. • Creatinine on admission 2.29, peaked to 4.78 on 6/22/23.   • Initially etiology was felt to be urinary retention however creatinine did not improve with Tejeda catheter.  Most likely ATN but unclear etiology for it.  Suspicion for daptomycin reaction.  Daptomycin was discontinued on 6/21 and patient was started on vancomycin instead. • Patient's creatinine has been improving , it decreased to 1. 5  this morning.    • Had post ATN diuresis. Was started on oral Lasix 20 mg, urine output 3.9 L in the past 24 h       2. Hypertension   • Blood pressure controlled. • Patient's blood pressure meds held since admission  • Was on diltiazem 180 mg daily, losartan 100 and Dyazide daily.      3.  Left shoulder prosthetic joint infection  • Currently on IV vancomycin through 7/28  • Managed per ID  • Also patient has fungal irritation on the skin was started on fluconazole oral for 7 days      4.  Diabetes mellitus type 2- per [facility]     5. Urine retention   • Tejeda was removed on 6/25  • Urinating well      6. Lower extremity edema  • Improved   • LVEF 60%, DD1, mild AS   • Was on dyazide as outpatient, held since admission   • Was started on Lasix 20 mg yesterday      Plan:  • Improving renal function  • Avoid losartan on discharge. • Recommend Lasix 20 mg every other day or as needed on discharge. • Low salt diet   • Continue to hold BP meds. Recommend home BP monitoring and follow up with PCP if BP >130/80 mmHg        Will discuss with my attending         Subjective:   Patient feeling well, will be discharged today     Objective:     Vitals: Blood pressure 115/66, pulse 85, temperature 98.2 °F (36.8 °C), resp. rate 18, weight 116 kg (254 lb 13.6 oz), SpO2 99 %. ,Body mass index is 35.54 kg/m².     Weight (last 2 days) Date/Time Weight    06/28/23 0600 116 (254.85)    06/27/23 0600 116 (256.84)    06/26/23 0600 118 (260.14)            Intake/Output Summary (Last 24 hours) at 6/28/2023 0912  Last data filed at 6/28/2023 0700  Gross per 24 hour   Intake 250 ml   Output 3900 ml   Net -3650 ml            Physical Exam  Vitals reviewed. Constitutional:       General: He is not in acute distress. Appearance: He is obese. He is not diaphoretic. Eyes:      General: No scleral icterus. Right eye: No discharge. Left eye: No discharge. Cardiovascular:      Rate and Rhythm: Normal rate and regular rhythm. Heart sounds: Murmur heard. Pulmonary:      Effort: No respiratory distress. Breath sounds: Normal breath sounds. No wheezing, rhonchi or rales. Abdominal:      General: There is no distension. Palpations: Abdomen is soft. Tenderness: There is no abdominal tenderness. There is no guarding or rebound. Musculoskeletal:      Right lower leg: Edema (improved ) present. Left lower leg: Edema (improved ) present. Skin:     General: Skin is warm. Neurological:      Mental Status: He is alert and oriented to person, place, and time. Psychiatric:         Mood and Affect: Mood normal.         Behavior: Behavior normal.         Thought Content:  Thought content normal.         Judgment: Judgment normal.         Lab, Imaging and other studies:   CBC:   Lab Results   Component Value Date    WBC 2.30 (L) 06/28/2023    HGB 8.2 (L) 06/28/2023    HCT 26.8 (L) 06/28/2023    MCV 86 06/28/2023     (L) 06/28/2023    RBC 3.11 (L) 06/28/2023    MCH 26.4 (L) 06/28/2023    MCHC 30.6 (L) 06/28/2023    RDW 15.5 (H) 06/28/2023    MPV 12.6 06/28/2023    NRBC 0 06/28/2023     CMP:   Lab Results   Component Value Date    SODIUM 140 06/28/2023    K 3.5 06/28/2023     (H) 06/28/2023    CO2 26 06/28/2023    BUN 23 06/28/2023    CREATININE 1.59 (H) 06/28/2023    CALCIUM 8.3 (L) 06/28/2023    AST 40 (H) 06/28/2023    ALT 27 06/28/2023    ALKPHOS 175 (H) 06/28/2023    EGFR 43 06/28/2023

## 2023-06-28 NOTE — DISCHARGE SUMMARY
[facility]  [facility] 1955, 69 [facility] male MRN: [id number]  Unit/Bed#: S -41 Encounter: [id number]  Primary Care Provider: David Khan MD   Date and time admitted to hospital: 6/20/2023  2:04 AM    * ABILIO (acute kidney injury) [facility])  Assessment & Plan  · Most likely suspected secondary to ATN. · Acute interstitial nephritis was also differential diagnosis. · However discussed with nephrology, and since her creatinine is now getting better so soon possibly ATN more likely. · Etiology for ATN is unclear though. · Patient was on IV daptomycin before which has been switched over to IV vancomycin. Dosing as per pharmacy and ID recommendations. · Creatinine is improving. Continue patient on monitoring BMP. Lab Results   Component Value Date    CREATININE 1.59 (H) 06/28/2023     Continue to hold Dyazide and losartan. · Nephrology following. · Discontinued cadena catheter 6/25/23. He is urinating well. No obstruction no problem no need of Cadena catheter again hopefully. · BMP 1 week    Postoperative infection of surgical wound  Assessment & Plan  · Patient is s/p reverse total shoulder arthoplasty on 4/423 and s/p I&D of left shoulder on 5/2/23 with recent admission at [facility] for left shoulder surgical site infection with abscess and possible prosthetic infection and is now s/p explantation and spacer placement on 6/13/23 with cultures growing cutibacterium avidum and staphylococcus epidermidis. · Discharged from [facility] on 6/19/23 on IV daptomycin 700 mg q24h for 6 weeks (until 7/28/23). · Patient now switched to IV vancomycin from daptomycin because of concern for acute kidney injury. continue IV vancomycin through 7/28/2023 for 6 weeks of IV antibiotic treatment  · Presently on p.o. fluconazole through 7/1 for fungal infection  · Vancomycin dosing as per pharmacy. Still creatinine improving and hence the final dosing for vancomycin has not been achieved.    · Will need VNA upon discharge, case management to assist.    Ulcer of left foot, limited to breakdown of skin [facility])  Assessment & Plan  · Consulted podiatry for the same. · Cont. Local wound care. Morbid obesity ([address])  Assessment & Plan  · BMI 36.50. · Recommend diet, lifestyle modifications. Pancytopenia ([address])  Assessment & Plan  · In the setting of cirrhosis  · Blood counts stable    MEG (obstructive sleep apnea)  Assessment & Plan  · Continue CPAP HS. Alcoholic cirrhosis ([address])  Assessment & Plan  · CT shows "cirrhotic liver with portal hypertension. Splenomegaly. Trace ascites."  · Follows with GI as an outpatient. Essential hypertension  Assessment & Plan  · Stable. · Continue Cardizem. · Losartan on hold. Insulin-dependent diabetes mellitus  Assessment & Plan  Lab Results   Component Value Date    HGBA1C 8.4 (H) 03/20/2023       Recent Labs     06/27/23  1017 06/27/23  1606 06/27/23  2132 06/28/23  0630   POCGLU 190* 142* 188* 79       Blood Sugar Average: Last 72 hrs:  (P) 162.3692948314361773   • Patient's Accucheck rising, appetite wnl. Resume Amaryl. • Monitor accu-checks AC and HS. • Continue Lantus 20 units with sliding scale coverage  • SSI coverage. • Hypoglycemia protocol. Bilateral lower extremity edema-resolved as of 6/28/2023  Assessment & Plan  · Patient reports worsening b/l lower extremity edema since his recent hospitalization. · RLE edema > LLE on exam which patient reports is typical for his edema. · BNP mildly elevated at 259. · Recent echo on 6/16/23 with EF 60%, G1DD, mild AS. · Not on Lasix as an outpatient; prescribed Dyazide which will be held in the setting of ABILIO. · Venous Doppler negative for DVT  · Elevate extremities.      Medical Problems     Resolved Problems  Date Reviewed: 6/27/2023          Resolved    Bilateral lower extremity edema 6/28/2023     Resolved by  Rakel Whitehead PA-C        Discharging Physician / Practitioner: Dori Angeles Elaine Pruitt PA-C  PCP: Cheryl Goldman MD  Admission Date:   Admission Orders (From admission, onward)     Ordered        06/20/23 0628  INPATIENT ADMISSION  Once                      Discharge Date: 06/28/23    Consultations During Hospital Stay:  · ID  · Ortho  · Nephrology     Procedures Performed:   · none    Significant Findings / Test Results:   · As above    Incidental Findings:   · none     Test Results Pending at Discharge (will require follow up):   · none     Outpatient Tests Requested:  · vanco throughs  · BMP 1 week    Complications:  none    Reason for Admission: L shoulder infection    Hospital Course:   Cornel Martin is a 69 y.o. male patient who originally presented to the hospital on 6/20/2023 due to acute infection of left shoulder. Please note that the patient had a very extended hospitalization and below is a brief summary of the patient's stay. Please refer to daily progress notes for full details of the patient's hospitalization. Patient has had complications postsurgically after his left shoulder repair. Came in with worsening infection and was seen by orthopedics as well as infection disease. Needs 6 weeks of antibiotics with PICC line in place. Unfortunately developed ATN likely from daptomycin which improved with IV hydration as well as holding her nephrotoxins. Creatinine returning back to baseline. Infection diseases recommending 6 weeks of antibiotics with IV vancomycin which was set up with VNA. Otherwise he is doing well and will be going home later today with outpatient follow-up with orthopedics encouraged. Please see above list of diagnoses and related plan for additional information.      Condition at Discharge: stable    Discharge Day Visit / Exam:   Subjective:  No overnight events, doing well  Vitals: Blood Pressure: 115/66 (06/28/23 0630)  Pulse: 85 (06/28/23 0630)  Temperature: 98.2 °F (36.8 °C) (06/28/23 0630)  Temp Source: Oral (06/27/23 1513)  Respirations: 18 (06/28/23 0630)  Weight - Scale: 116 kg (254 lb 13.6 oz) (06/28/23 0600)  SpO2: 99 % (06/28/23 0630)  Exam:   Physical Exam  Vitals and nursing note reviewed. Constitutional:       General: He is not in acute distress. Appearance: Normal appearance. HENT:      Head: Normocephalic. Mouth/Throat:      Mouth: Mucous membranes are moist.   Eyes:      Pupils: Pupils are equal, round, and reactive to light. Cardiovascular:      Rate and Rhythm: Normal rate and regular rhythm. Heart sounds: No murmur heard. Pulmonary:      Effort: Pulmonary effort is normal. No respiratory distress. Breath sounds: Normal breath sounds. No wheezing, rhonchi or rales. Abdominal:      General: Bowel sounds are normal. There is no distension. Palpations: Abdomen is soft. Tenderness: There is no abdominal tenderness. There is no guarding. Musculoskeletal:         General: No deformity. Cervical back: Normal range of motion. Right lower leg: No edema. Left lower leg: No edema. Comments: Left shoulder wound healing, CDI   Skin:     Capillary Refill: Capillary refill takes less than 2 seconds. Neurological:      General: No focal deficit present. Mental Status: He is alert and oriented to person, place, and time. Mental status is at baseline. Discussion with Family: Patient declined call to . Discharge instructions/Information to patient and family:   See after visit summary for information provided to patient and family. Provisions for Follow-Up Care:  See after visit summary for information related to follow-up care and any pertinent home health orders. Disposition:   Home with VNA Services (Reminder: Complete face to face encounter)    Planned Readmission: no     Discharge Statement:  I spent 45 minutes discharging the patient. This time was spent on the day of discharge. I had direct contact with the patient on the day of discharge.  Greater than 50% of the total time was spent examining patient, answering all patient questions, arranging and discussing plan of care with patient as well as directly providing post-discharge instructions. Additional time then spent on discharge activities. Discharge Medications:  See after visit summary for reconciled discharge medications provided to patient and/or family.       **Please Note: This note may have been constructed using a voice recognition system**

## 2023-06-28 NOTE — ASSESSMENT & PLAN NOTE
· Most likely suspected secondary to ATN. · Acute interstitial nephritis was also differential diagnosis. · However discussed with nephrology, and since her creatinine is now getting better so soon possibly ATN more likely. · Etiology for ATN is unclear though. · Patient was on IV daptomycin before which has been switched over to IV vancomycin. Dosing as per pharmacy and ID recommendations. · Creatinine is improving. Continue patient on monitoring BMP. Lab Results   Component Value Date    CREATININE 1.59 (H) 06/28/2023     Continue to hold Dyazide and losartan. · Nephrology following. · Discontinued cadena catheter 6/25/23. He is urinating well. No obstruction no problem no need of Cadena catheter again hopefully.   · BMP 1 week

## 2023-06-28 NOTE — CASE MANAGEMENT
Case Management Discharge Planning Note    Patient name Cindy Rivera  Location S /S -17 MRN [id number]  : 1955 Date 2023       Current Admission Date: 2023  Current Admission Diagnosis:ABILIO (acute kidney injury) [facility])   Patient Active Problem List    Diagnosis Date Noted   • Urinary retention 2023   • Bilateral lower extremity edema 2023   • Acute kidney injury superimposed on chronic kidney disease stage 3 06/15/2023   • Murmur 06/15/2023   • Postoperative infection of surgical wound 2023   • Ulcer of left foot, limited to breakdown of skin ([address]) 2023   • Aftercare following left shoulder joint replacement surgery 2023   • S/P reverse total shoulder arthroplasty, left 2023   • Obesity, morbid ([address]) 2022   • Post-traumatic osteoarthritis of left shoulder 2022   • Morbid obesity ([address]) 2022   • Cellulitis 2022   • Diabetic ulcer of left foot associated with type 2 diabetes mellitus ([address]) 2022   • Leukopenia 2022   • Insomnia 2020   • Elevated troponin 2020   • Chest pressure 2020   • Alcohol abuse 2020   • Insulin-dependent diabetes mellitus 2020   • Essential hypertension 2020   • ABILIO (acute kidney injury) ([address]) 2020   • Cholelithiasis    • Alcoholic cirrhosis ([address])    • MEG (obstructive sleep apnea) 2020   • Pancytopenia ([address]) 2020   • Thrombocytopenia ([address]) 2020      LOS (days): 8  Geometric Mean LOS (GMLOS) (days): 5.00  Days to GMLOS:-3.1     OBJECTIVE:  Risk of Unplanned Readmission Score: 21.7         Current admission status: Inpatient   Preferred Pharmacy:   [facility], [address] [place] [address] Drive  [address] [address]  Phone: [phone] Fax: [phone]    Primary Care Provider: Di Frye MD    Primary Insurance: MEDICARE  Secondary Insurance: Tisha Hooker    DISCHARGE DETAILS:    Discharge planning discussed with[de-identified] Patient and spouse  Freedom of Choice: Yes  Comments - Freedom of Choice: Reviewed [facility] at DC and [facility] delivery schedules  CM contacted family/caregiver?: Yes  Were Treatment Team discharge recommendations reviewed with patient/caregiver?: Yes  Did patient/caregiver verbalize understanding of patient care needs?: Yes  Were patient/caregiver advised of the risks associated with not following Treatment Team discharge recommendations?: Yes    Contacts  Patient Contacts: Brigitte Trent (Spouse)  Relationship to Patient[de-identified] Family  Contact Method: In Person  Reason/Outcome: Discharge Planning, Emergency Contact, Continuity of [facility]          Is the patient interested in [address] at discharge?: Yes  [facility] requested[de-identified] [facility] Name[de-identified] Amadou Provider[de-identified] PCP  Home Health Services Needed[de-identified] Administration of IV, IM or SC Medications, Evaluate Functional Status and Safety, Wound/Ostomy Care, Post-Op Care and Assessment  Homebound Criteria Met[de-identified] Requires the Assistance of Another Person for Safe Ambulation or to Leave the Home  Supporting Clincal Findings[de-identified] Limited Endurance    DME Referral Provided  Referral made for DME?: No    Other Referral/Resources/Interventions Provided:  Interventions: HHC, Home Infusion         Treatment Team Recommendation: Home with [facility]  Discharge Destination Plan[de-identified] Home with [facility] at Discharge : Family                             IMM Given (Date):: 06/27/23  IMM Given to[de-identified] Patient        CM met with patient and spouse at bedside to discuss DCP. CM reviewed that [facility] was arranged and is scheduled for his first nurse visit and ABX teaching on Thursday 6/29/23 at 10:00 AM.    Updated IV ABX orders were received from ID. These were sent to [facility]. With the ABX plan in place patient will be ready for DC.   CM requested the new ABX be delivered tomorrow 6/28 in the AM after confirming with [facility] that they are able to schedule patient for a 10 AM nurse visit on 6/29. Patient and spouse requested from CM that a nurse come out to look at his shoulder 3 times a week. CM explained to them that we are unable to dictate how often the nurse will be out to see him as this is determined when they complete their initial assessment of need. Patient verbalized that others had implied that CM would be able to make these arrangements. CM explained that VNA with SN is set up however the frequency is not something that we can pre-arrange. Patient asked about paying for extra visits. CM explained that VNA agencies would not be able to do this as they are paid through patient's insurance. Patient was visibly frustrated with this information as he feels like his needed repeated surgeries from someone not looking at his wound frequently enough. CM will pass these concerns on to SL[facility] and Ortho and ensure that there are clear wound care instructions on the AVS for [facility]. CM department will continue to follow to assist with discharge coordination.

## 2023-06-28 NOTE — CASE MANAGEMENT
Case Management Discharge Planning Note    Patient name Florence Mcfadden  Location S /S -36 MRN [id number]  : 1955 Date 2023       Current Admission Date: 2023  Current Admission Diagnosis:ABILIO (acute kidney injury) [facility])   Patient Active Problem List    Diagnosis Date Noted   • Urinary retention 2023   • Acute kidney injury superimposed on chronic kidney disease stage 3 06/15/2023   • Murmur 06/15/2023   • Postoperative infection of surgical wound 2023   • Ulcer of left foot, limited to breakdown of skin ([address]) 2023   • Aftercare following left shoulder joint replacement surgery 2023   • S/P reverse total shoulder arthroplasty, left 2023   • Obesity, morbid ([address]) 2022   • Post-traumatic osteoarthritis of left shoulder 2022   • Morbid obesity ([address]) 2022   • Cellulitis 2022   • Diabetic ulcer of left foot associated with type 2 diabetes mellitus ([address]) 2022   • Leukopenia 2022   • Insomnia 2020   • Elevated troponin 2020   • Chest pressure 2020   • Alcohol abuse 2020   • Insulin-dependent diabetes mellitus 2020   • Essential hypertension 2020   • ABILIO (acute kidney injury) ([address]) 2020   • Cholelithiasis    • Alcoholic cirrhosis ([address])    • MEG (obstructive sleep apnea) 2020   • Pancytopenia ([address]) 2020   • Thrombocytopenia ([address]) 2020      LOS (days): 8  Geometric Mean LOS (GMLOS) (days): 5.00  Days to GMLOS:-3.3     OBJECTIVE:  Risk of Unplanned Readmission Score: 22.61         Current admission status: Inpatient   Preferred Pharmacy:   [address]  [address] [place] [address]  Phone: [phone] Fax: [phone]    Primary Care Provider: Eric Amin MD    Primary Insurance: MEDICARE  Secondary Insurance: Reji Arellano    DISCHARGE DETAILS:    Discharge planning discussed with[de-identified] Patient and spouse  Freedom of Choice: Yes  Comments - Freedom of Choice: Reviewed again plan for SN visit tomorrow at 10 AM - [facility] delivered IV ABX to bedside  CM contacted family/caregiver?: Yes  Were Treatment Team discharge recommendations reviewed with patient/caregiver?: Yes  Did patient/caregiver verbalize understanding of patient care needs?: Yes  Were patient/caregiver advised of the risks associated with not following Treatment Team discharge recommendations?: Yes    Contacts  Patient Contacts: Rozell Schaumann (Spouse)  Relationship to Patient[de-identified] Family  Contact Method: In Person  Reason/Outcome: Discharge Planning, Continuity of [address] [facility]          Is the patient interested in [address] [facility] at discharge?: Yes  [facility] requested[de-identified] [facility] Name[de-identified] Amadou Provider[de-identified] PCP  Home Health Services Needed[de-identified] Administration of IV, IM or SC Medications, Evaluate Functional Status and Safety, Post-Op Care and Assessment, Wound/Ostomy Care  Homebound Criteria Met[de-identified] Requires the Assistance of Another Person for Safe Ambulation or to Leave the Home  Supporting Clincal Findings[de-identified] Limited Endurance    DME Referral Provided  Referral made for DME?: No    Other Referral/Resources/Interventions Provided:  Interventions: HHC, Home Infusion         Treatment Team Recommendation: Home with [facility]  Discharge Destination Plan[de-identified] Home with [address] [facility] at Discharge : Family                             IMM Given (Date):: 06/27/23  IMM Given to[de-identified] Patient                          Patient is medically stable for DC today after he gets his dose of ABX. CM confirmed with [facility] for nurse visit tomorrow 6/29 at 10 AM.      [facility] delivered his updated IV ABX to bedside. All questions were answered at this time. CM encouraged patient to follow up with all recommended appointments after discharge.  Patient advised of importance for patient and family to participate in managing patient's medical well being.

## 2023-06-28 NOTE — TELEPHONE ENCOUNTER
----- Message from Tawana Aguero MD sent at 6/28/2023  3:24 PM EDT -----  Patient discharged from [address] [place] [address] today, please order for BMP for acute kidney injury to be done in 1 week  Please arrange for follow-up with an advanced practitioner in next 2 to 6 weeks

## 2023-06-28 NOTE — PROGRESS NOTES
Vanessa Gupta is a 69 y.o. male who is currently ordered Vancomycin IV with management by the Pharmacy Consult service. Relevant clinical data and objective / subjective history reviewed. Vancomycin Assessment:  Indication and Goal AUC/Trough: Bone/joint infection (goal -600, trough >10), -600, trough >10  Clinical Status: stable  Micro:   No results  Renal Function:  SCr: 1.59 mg/dL  CrCl: 56.2 mL/min  Renal replacement: Not on dialysis  Days of Therapy: 7  Current Dose: 1500mg IV daily PRN when level <15  Vancomycin Plan:  New Dosinmg IV q24h  Estimated AUC: 496 mcg*hr/mL  Estimated Trough: 14 mcg/mL  Next Level: 23 @ 0600  Renal Function Monitoring: Daily BMP and [place] will continue to follow closely for s/sx of nephrotoxicity, infusion reactions and appropriateness of therapy. BMP and CBC will be ordered per protocol. We will continue to follow the patient’s culture results and clinical progress daily.     Barbara Hanson, Pharmacist

## 2023-06-29 ENCOUNTER — TELEPHONE (OUTPATIENT)
Dept: INFECTIOUS DISEASES | Facility: CLINIC | Age: 68
End: 2023-06-29

## 2023-06-29 ENCOUNTER — HOME CARE VISIT (OUTPATIENT)
Dept: HOME HEALTH SERVICES | Facility: HOME HEALTHCARE | Age: 68
End: 2023-06-29
Payer: MEDICARE

## 2023-06-29 VITALS
TEMPERATURE: 97.4 F | RESPIRATION RATE: 20 BRPM | DIASTOLIC BLOOD PRESSURE: 70 MMHG | SYSTOLIC BLOOD PRESSURE: 140 MMHG | OXYGEN SATURATION: 99 % | HEART RATE: 77 BPM

## 2023-06-29 DIAGNOSIS — Z96.619: Primary | ICD-10-CM

## 2023-06-29 DIAGNOSIS — T84.59XA: Primary | ICD-10-CM

## 2023-06-29 PROCEDURE — 10330081 VN NO-PAY CLAIM PROCEDURE

## 2023-06-29 PROCEDURE — G0299 HHS/HOSPICE OF RN EA 15 MIN: HCPCS

## 2023-06-29 PROCEDURE — 400013 VN SOC

## 2023-06-29 NOTE — PROGRESS NOTES
OPAT NOTE    Supervising/Discharge physician: Doyle    Diagnosis: left shoulder infection w/absecess    Drug:Vancomycin     Dose/Frequency:2gQ24    End Date:7/28    Vanco Trough Range:11 4-17 2    Infusion/[facility] contact:Florecitatar/MILLER    Next appointment:7/12        MA assigned: Lisseth

## 2023-06-29 NOTE — TELEPHONE ENCOUNTER
Called and spoke with patients wife Moira Abreu  Went over antibiotic plan with Moira Abreu  Informed Moira Abreu orders have been placed to [facility] for weekly labs  Also reminded Tammy of patients follow up appointment 7/12/23 at 10:30AM with Dr Aleks Gordon at the [place] office  Informed Moira Abreu if there are any further questions or concerns to call the office  Jody jovel at this time

## 2023-06-30 ENCOUNTER — TELEPHONE (OUTPATIENT)
Dept: OBGYN CLINIC | Facility: HOSPITAL | Age: 68
End: 2023-06-30

## 2023-06-30 LAB
BACTERIA SPEC ANAEROBE CULT: ABNORMAL
BACTERIA TISS AEROBE CULT: ABNORMAL
GRAM STN SPEC: ABNORMAL

## 2023-06-30 PROCEDURE — 10330064 PAD, ABD 5X9" STR LF (1/PK 20PK/BX) MGM1

## 2023-06-30 PROCEDURE — 10330064 WIPE, SKIN GEL PROT DRSNG (50/BX)

## 2023-06-30 PROCEDURE — 10330064 TAPE, ADHSV TRANSPORE WHT 2" (6RL/BX 10B

## 2023-06-30 NOTE — TELEPHONE ENCOUNTER
Hello,    Please advise if the following patient can be forced onto the schedule:    Patient: Roseanne Britton Both     : 1955    MRN: [id number]    Call back #: [phone]    Insurance: JONNY/Jonathan    Reason for appointment: Patient had surgery on  with Dr Adeola Landeros  He was discharged from the hospital on  and is supposed to do a one week follow up with Dr Adeola Landeros  Requested doctor/location: Lauren    Thank you  E-mail sent to supervisors

## 2023-07-01 ENCOUNTER — HOME CARE VISIT (OUTPATIENT)
Dept: HOME HEALTH SERVICES | Facility: HOME HEALTHCARE | Age: 68
End: 2023-07-01
Payer: MEDICARE

## 2023-07-01 VITALS
HEART RATE: 61 BPM | DIASTOLIC BLOOD PRESSURE: 70 MMHG | TEMPERATURE: 98.2 F | OXYGEN SATURATION: 99 % | SYSTOLIC BLOOD PRESSURE: 130 MMHG

## 2023-07-01 PROCEDURE — G0299 HHS/HOSPICE OF RN EA 15 MIN: HCPCS

## 2023-07-03 ENCOUNTER — HOME CARE VISIT (OUTPATIENT)
Dept: HOME HEALTH SERVICES | Facility: HOME HEALTHCARE | Age: 68
End: 2023-07-03
Payer: MEDICARE

## 2023-07-03 ENCOUNTER — LAB REQUISITION (OUTPATIENT)
Dept: LAB | Facility: HOSPITAL | Age: 68
End: 2023-07-03
Payer: MEDICARE

## 2023-07-03 VITALS
DIASTOLIC BLOOD PRESSURE: 70 MMHG | OXYGEN SATURATION: 98 % | HEART RATE: 80 BPM | TEMPERATURE: 98 F | SYSTOLIC BLOOD PRESSURE: 118 MMHG

## 2023-07-03 DIAGNOSIS — T84.59XA INFECTION AND INFLAMMATORY REACTION DUE TO OTHER INTERNAL JOINT PROSTHESIS, INITIAL ENCOUNTER (HCC): ICD-10-CM

## 2023-07-03 DIAGNOSIS — Z96.619 PRESENCE OF UNSPECIFIED ARTIFICIAL SHOULDER JOINT: ICD-10-CM

## 2023-07-03 LAB
ANISOCYTOSIS BLD QL SMEAR: PRESENT
BASOPHILS # BLD AUTO: 0.03 THOUSANDS/ÂΜL (ref 0–0.1)
BASOPHILS NFR BLD AUTO: 1 % (ref 0–1)
CREAT SERPL-MCNC: 1.28 MG/DL (ref 0.6–1.3)
EOSINOPHIL # BLD AUTO: 0.34 THOUSAND/ÂΜL (ref 0–0.61)
EOSINOPHIL NFR BLD AUTO: 8 % (ref 0–6)
ERYTHROCYTE [DISTWIDTH] IN BLOOD BY AUTOMATED COUNT: 16.4 % (ref 11.6–15.1)
GFR SERPL CREATININE-BSD FRML MDRD: 56 ML/MIN/1.73SQ M
HCT VFR BLD AUTO: 28.5 % (ref 36.5–49.3)
HGB BLD-MCNC: 8.7 G/DL (ref 12–17)
IMM GRANULOCYTES # BLD AUTO: 0.01 THOUSAND/UL (ref 0–0.2)
IMM GRANULOCYTES NFR BLD AUTO: 0 % (ref 0–2)
LYMPHOCYTES # BLD AUTO: 0.36 THOUSANDS/ÂΜL (ref 0.6–4.47)
LYMPHOCYTES NFR BLD AUTO: 8 % (ref 14–44)
MCH RBC QN AUTO: 26.8 PG (ref 26.8–34.3)
MCHC RBC AUTO-ENTMCNC: 30.5 G/DL (ref 31.4–37.4)
MCV RBC AUTO: 88 FL (ref 82–98)
MONOCYTES # BLD AUTO: 0.34 THOUSAND/ÂΜL (ref 0.17–1.22)
MONOCYTES NFR BLD AUTO: 8 % (ref 4–12)
NEUTROPHILS # BLD AUTO: 3.33 THOUSANDS/ÂΜL (ref 1.85–7.62)
NEUTS SEG NFR BLD AUTO: 75 % (ref 43–75)
NRBC BLD AUTO-RTO: 0 /100 WBCS
PLATELET # BLD AUTO: 85 THOUSANDS/UL (ref 149–390)
PLATELET BLD QL SMEAR: ABNORMAL
RBC # BLD AUTO: 3.25 MILLION/UL (ref 3.88–5.62)
RBC MORPH BLD: PRESENT
VANCOMYCIN TROUGH SERPL-MCNC: 14.6 UG/ML (ref 10–20)
WBC # BLD AUTO: 4.41 THOUSAND/UL (ref 4.31–10.16)

## 2023-07-03 PROCEDURE — 85025 COMPLETE CBC W/AUTO DIFF WBC: CPT | Performed by: STUDENT IN AN ORGANIZED HEALTH CARE EDUCATION/TRAINING PROGRAM

## 2023-07-03 PROCEDURE — G0299 HHS/HOSPICE OF RN EA 15 MIN: HCPCS

## 2023-07-03 PROCEDURE — 80202 ASSAY OF VANCOMYCIN: CPT | Performed by: STUDENT IN AN ORGANIZED HEALTH CARE EDUCATION/TRAINING PROGRAM

## 2023-07-03 PROCEDURE — 82565 ASSAY OF CREATININE: CPT | Performed by: STUDENT IN AN ORGANIZED HEALTH CARE EDUCATION/TRAINING PROGRAM

## 2023-07-06 ENCOUNTER — OFFICE VISIT (OUTPATIENT)
Dept: OBGYN CLINIC | Facility: CLINIC | Age: 68
End: 2023-07-06

## 2023-07-06 ENCOUNTER — HOSPITAL ENCOUNTER (OUTPATIENT)
Dept: RADIOLOGY | Facility: HOSPITAL | Age: 68
End: 2023-07-06
Attending: ORTHOPAEDIC SURGERY
Payer: MEDICARE

## 2023-07-06 VITALS
WEIGHT: 262 LBS | SYSTOLIC BLOOD PRESSURE: 144 MMHG | BODY MASS INDEX: 36.68 KG/M2 | HEART RATE: 83 BPM | DIASTOLIC BLOOD PRESSURE: 73 MMHG | HEIGHT: 71 IN

## 2023-07-06 DIAGNOSIS — Z96.612 S/P REVERSE TOTAL SHOULDER ARTHROPLASTY, LEFT: ICD-10-CM

## 2023-07-06 DIAGNOSIS — Z96.612 S/P REVERSE TOTAL SHOULDER ARTHROPLASTY, LEFT: Primary | ICD-10-CM

## 2023-07-06 PROCEDURE — 99024 POSTOP FOLLOW-UP VISIT: CPT | Performed by: ORTHOPAEDIC SURGERY

## 2023-07-06 PROCEDURE — 73030 X-RAY EXAM OF SHOULDER: CPT

## 2023-07-06 NOTE — PROGRESS NOTES
[address]  [address]  [facility] [address]  [phone]       Chhaya Liang  [id number]  1955    ORTHOPAEDIC SURGERY OUTPATIENT NOTE  7/6/2023      HISTORY:  69 y.o. male presents 3 weeks status post left reverse shoulder arthroplasty explant, incision and debridement, and placement of antibiotic spacer. The patient was discharged from the hospital however he did develop ABILIO and was readmitted. During his hospital stay his sutures were removed. Patient has no issues with the left shoulder at this time. He also developed a fungal rash around the armpit which was treated with infectious disease by oral antifungal medicine. Patient states the rash has been improving since he started the therapy. He was started on a PICC line for his left shoulder infection and is to be continued for 6 weeks. He has been followed by infectious disease. Past Medical History:   Diagnosis Date   • Arrhythmia    • CPAP (continuous positive airway pressure) dependence    • Diabetes mellitus ([address])    • History of echocardiogram 11/14/2017    showed EF of 50-55 percentWith moderate LVH and left ventricle diastolic dysfunction. Left atrium was moderately enlarged. Trace MR noted. • History of Holter monitoring 11/21/2017    showed baseline rhythm of sinus origin with an average heart it of 61 bpm. The lowest heart rate was 49 and the highest heart rate was 10 8 bpm. There were rare single VPCs, and frequent PACs representing 3.2% of total beats. There were several episodes of sinus arrhythmias with sinus bradycardia and heart rate ranging from 40-90 bpm. No sustained dysrhythmias, or pauses noted.  The patient did not   • Hypertension    • Liver disease    • Obese    • Sleep apnea        Past Surgical History:   Procedure Laterality Date   • CATARACT EXTRACTION     • EYE SURGERY Left 1997   • HERNIA REPAIR  [id number]   • KNEE ARTHROPLASTY Right 2008   • OH ARTHROPLASTY GLENOHUMERAL JOINT TOTAL SHOULDER Left 2023    Procedure: ARTHROPLASTY SHOULDER REVERSE;  Surgeon: Krystyna Gaytan MD;  [facility];  Service: Orthopedics   • OH JARED SHOULDER ARTHRPLSTY HUMERAL/GLENOID COMPNT Left 2023    Procedure: ARTHROPLASTY SHOULDER REVISION;  Surgeon: Rashad Bennett;  Location[facility];  Service: Orthopedics   • SHOULDER SURGERY Right    • WOUND DEBRIDEMENT Left 2023    Procedure: INCISION AND DRAINAGE (I&D) EXTREMITY, vac placement;  Surgeon: Krystyna Gaytan MD;  Location[facility];  Service: Orthopedics       Social History     Socioeconomic History   • Marital status: /Civil Union     Spouse name: Not on file   • Number of children: 2   • Years of education: 16   • Highest education level: Some college, no degree   Occupational History   • Not on file   Tobacco Use   • Smoking status: Former     Packs/day: 0.50     Years: 20.00     Total pack years: 10.00     Types: Cigarettes     Quit date:      Years since quittin.5   • Smokeless tobacco: Never   Vaping Use   • Vaping Use: Never used   Substance and Sexual Activity   • Alcohol use: Not Currently     Comment: quit    • Drug use: Never   • Sexual activity: Not Currently   Other Topics Concern   • Not on file   Social History Narrative    · Most recent tobacco use screenin2018      · Do you currently or have you served in the [address] [place] [address] [employer]:   No      · Live alone or with others:   with others      · High blood pressure: Yes      · Exercise level:   Occasional      · Overweight:   Yes      · Obese:    Yes      · Diabetes:   Yes      Social Determinants of Health     Financial Resource Strain: Not on file   Food Insecurity: No Food Insecurity (2023)    Hunger Vital Sign    • Worried About Running Out of Food in the Last Year: Never true    • Ran Out of Food in the Last Year: Never true   Transportation Needs: No Transportation Needs (6/23/2023)    PRAPARE - Transportation    • Lack of Transportation (Medical): No    • Lack of Transportation (Non-Medical): No   Physical Activity: Not on file   Stress: Not on file   Social Connections: Not on file   Intimate Partner Violence: Not on file   Housing Stability: Low Risk  (6/23/2023)    Housing Stability Vital Sign    • Unable to Pay for Housing in the Last Year: No    • Number of Places Lived in the Last Year: 1    • Unstable Housing in the Last Year: No       Family History   Problem Relation Age of Onset   • Diabetes Mother    • Supraventricular tachycardia Mother    • COPD Father    • Heart disease Father    • Other Father         Sepsis; related to UTI with complicating cardiac problems   • Heart disease Paternal Grandmother         Patient's Medications   New Prescriptions    No medications on file   Previous Medications    FUROSEMIDE (LASIX) 20 MG TABLET    Take 1 tablet (20 mg total) by mouth every other day    GLIMEPIRIDE (AMARYL) 4 MG TABLET    Take 4 mg by mouth 2 (two) times a day    INSULIN GLARGINE (LANTUS) 100 UNITS/ML SUBCUTANEOUS INJECTION    Inject 20 Units under the skin daily at bedtime    INSULIN PEN NEEDLE (BD PEN NEEDLE TERRA 2ND GEN) 32G X 4 MM MISC    For use with insulin pen. Pharmacy may dispense brand covered by insurance. MULTIPLE VITAMIN (MULTIVITAMIN) CAPSULE    Take 1 capsule by mouth daily    SODIUM CHLORIDE FLUSH (NORMAL SALINE FLUSH IV)    Inject 10 mL into a catheter in a vein in the morning.  Flush pre antibiotic and post antibiotic and as needed for line patency  Indications: line patency    TAMSULOSIN (FLOMAX) 0.4 MG    Take 1 capsule (0.4 mg total) by mouth daily with dinner    TRAZODONE (DESYREL) 50 MG TABLET    Take 50 mg by mouth daily at bedtime bedtime    VANCOMYCIN 2,000 MG IN SODIUM CHLORIDE 0.9 % 500 ML IVPB    Inject 2,000 mg into a catheter in a vein over 120 minutes at 250 mL/hr every 24 hours   Modified Medications    No medications on file Discontinued Medications    No medications on file       Allergies   Allergen Reactions   • Sulfa Antibiotics Hives   • Daptomycin Other (See Comments)     Possibly contributed to ABILIO on 6/2023 admission         /73 (BP Location: Left arm, Patient Position: Sitting, Cuff Size: Large)   Pulse 83   Ht 5' 11" (1.803 m)   Wt 119 kg (262 lb)   BMI 36.54 kg/m²      REVIEW OF SYSTEMS:  Constitutional: Negative. HEENT: Negative. Respiratory: Negative. Skin: Negative. Neurological: Negative. Psychiatric/Behavioral: Negative. Musculoskeletal: Negative except for that mentioned in the HPI.    @[facility]@     PHYSICAL EXAM: Left shoulder: Incision site is well-healed. No signs of infection. Axillary rash improving with little to no signs of fungal infection. Active abduction to approximately 45 degrees. Pseudo paralytic. IMAGING: X-ray left shoulder: Status post placement of antibiotic spacer with no signs of celina-implant failure. Shoulder is well located. ASSESSMENT AND PLAN:  69 y.o. male 3 weeks status post left shoulder reverse arthroplasty explant, incision debridement, and placement of antibiotic spacer. The patient is to complete his IV antibiotic treatment for another 3 weeks. He is to have a 2-week antibiotic holiday at which time we will see him back in the office for an office aspiration of left shoulder to send for culture. If this is negative he will likely be needed to go to IR for shoulder aspiration. He is also to obtain blood work at that time for infection work-up. After repeat infection work-up if he is cleared from infection we can discuss replantation of a reverse shoulder arthroplasty.

## 2023-07-06 NOTE — ASSESSMENT & PLAN NOTE
Patient here with hx of left shoulder replacement 5/1122 complicated by site wound infection requiring admission from May 1 to May 5 status post I&D with Ortho 5/2  Evaluated by ID at this time  Presented back to the  with noted swelling and erythema of the surgical wound site, aspiration performed, cultures pending  Started on IV Unasyn  · CT LUE without contrast shows Localized fluid collection in the anterior aspect of left shoulder measuring 5 8 x 4 4 x 5 1 cm, which probably communicates with the glenohumeral joint  There are small foci of air within the collection, which may be related to superinfection or sequela of prior I&D procedure    · Ortho following, planning for OR tomorrow with Dr Grayson Roy, will keep NPO after MN    · ID consult pending  · WBAT LUE  · Pain control  · DVT ppx  · PT/OT Azelaic Acid Pregnancy And Lactation Text: This medication is considered safe during pregnancy and breast feeding. Topical Clindamycin Counseling: Patient counseled that this medication may cause skin irritation or allergic reactions.  In the event of skin irritation, the patient was advised to reduce the amount of the drug applied or use it less frequently.   The patient verbalized understanding of the proper use and possible adverse effects of clindamycin.  All of the patient's questions and concerns were addressed. Spironolactone Counseling: Patient advised regarding risks of diarrhea, abdominal pain, hyperkalemia, birth defects (for female patients), liver toxicity and renal toxicity. The patient may need blood work to monitor liver and kidney function and potassium levels while on therapy. The patient verbalized understanding of the proper use and possible adverse effects of spironolactone.  All of the patient's questions and concerns were addressed. Isotretinoin Pregnancy And Lactation Text: This medication is Pregnancy Category X and is considered extremely dangerous during pregnancy. It is unknown if it is excreted in breast milk. Topical Sulfur Applications Counseling: Topical Sulfur Counseling: Patient counseled that this medication may cause skin irritation or allergic reactions.  In the event of skin irritation, the patient was advised to reduce the amount of the drug applied or use it less frequently.   The patient verbalized understanding of the proper use and possible adverse effects of topical sulfur application.  All of the patient's questions and concerns were addressed. Detail Level: Zone Dapsone Counseling: I discussed with the patient the risks of dapsone including but not limited to hemolytic anemia, agranulocytosis, rashes, methemoglobinemia, kidney failure, peripheral neuropathy, headaches, GI upset, and liver toxicity.  Patients who start dapsone require monitoring including baseline LFTs and weekly CBCs for the first month, then every month thereafter.  The patient verbalized understanding of the proper use and possible adverse effects of dapsone.  All of the patient's questions and concerns were addressed. High Dose Vitamin A Pregnancy And Lactation Text: High dose vitamin A therapy is contraindicated during pregnancy and breast feeding. Benzoyl Peroxide Pregnancy And Lactation Text: This medication is Pregnancy Category C. It is unknown if benzoyl peroxide is excreted in breast milk. Tetracycline Counseling: Patient counseled regarding possible photosensitivity and increased risk for sunburn.  Patient instructed to avoid sunlight, if possible.  When exposed to sunlight, patients should wear protective clothing, sunglasses, and sunscreen.  The patient was instructed to call the office immediately if the following severe adverse effects occur:  hearing changes, easy bruising/bleeding, severe headache, or vision changes.  The patient verbalized understanding of the proper use and possible adverse effects of tetracycline.  All of the patient's questions and concerns were addressed. Patient understands to avoid pregnancy while on therapy due to potential birth defects. Doxycycline Counseling:  Patient counseled regarding possible photosensitivity and increased risk for sunburn.  Patient instructed to avoid sunlight, if possible.  When exposed to sunlight, patients should wear protective clothing, sunglasses, and sunscreen.  The patient was instructed to call the office immediately if the following severe adverse effects occur:  hearing changes, easy bruising/bleeding, severe headache, or vision changes.  The patient verbalized understanding of the proper use and possible adverse effects of doxycycline.  All of the patient's questions and concerns were addressed. Winlevi Counseling:  I discussed with the patient the risks of topical clascoterone including but not limited to erythema, scaling, itching, and stinging. Patient voiced their understanding. Aklief counseling:  Patient advised to apply a pea-sized amount only at bedtime and wait 30 minutes after washing their face before applying.  If too drying, patient may add a non-comedogenic moisturizer.  The most commonly reported side effects including irritation, redness, scaling, dryness, stinging, burning, itching, and increased risk of sunburn.  The patient verbalized understanding of the proper use and possible adverse effects of retinoids.  All of the patient's questions and concerns were addressed. Topical Retinoid Pregnancy And Lactation Text: This medication is Pregnancy Category C. It is unknown if this medication is excreted in breast milk. Azithromycin Pregnancy And Lactation Text: This medication is considered safe during pregnancy and is also secreted in breast milk. Include Pregnancy/Lactation Warning?: No Erythromycin Counseling:  I discussed with the patient the risks of erythromycin including but not limited to GI upset, allergic reaction, drug rash, diarrhea, increase in liver enzymes, and yeast infections. Bactrim Pregnancy And Lactation Text: This medication is Pregnancy Category D and is known to cause fetal risk.  It is also excreted in breast milk. Minocycline Pregnancy And Lactation Text: This medication is Pregnancy Category D and not consider safe during pregnancy. It is also excreted in breast milk. Azelaic Acid Counseling: Patient counseled that medicine may cause skin irritation and to avoid applying near the eyes.  In the event of skin irritation, the patient was advised to reduce the amount of the drug applied or use it less frequently.   The patient verbalized understanding of the proper use and possible adverse effects of azelaic acid.  All of the patient's questions and concerns were addressed. Tazorac Pregnancy And Lactation Text: This medication is not safe during pregnancy. It is unknown if this medication is excreted in breast milk. Isotretinoin Counseling: Patient should get monthly blood tests, not donate blood, not drive at night if vision affected, not share medication, and not undergo elective surgery for 6 months after tx completed. Side effects reviewed, pt to contact office should one occur. Birth Control Pills Pregnancy And Lactation Text: This medication should be avoided if pregnant and for the first 30 days post-partum. High Dose Vitamin A Counseling: Side effects reviewed, pt to contact office should one occur. Spironolactone Pregnancy And Lactation Text: This medication can cause feminization of the male fetus and should be avoided during pregnancy. The active metabolite is also found in breast milk. Benzoyl Peroxide Counseling: Patient counseled that medicine may cause skin irritation and bleach clothing.  In the event of skin irritation, the patient was advised to reduce the amount of the drug applied or use it less frequently.   The patient verbalized understanding of the proper use and possible adverse effects of benzoyl peroxide.  All of the patient's questions and concerns were addressed. Topical Clindamycin Pregnancy And Lactation Text: This medication is Pregnancy Category B and is considered safe during pregnancy. It is unknown if it is excreted in breast milk. Topical Retinoid counseling:  Patient advised to apply a pea-sized amount only at bedtime and wait 30 minutes after washing their face before applying.  If too drying, patient may add a non-comedogenic moisturizer. The patient verbalized understanding of the proper use and possible adverse effects of retinoids.  All of the patient's questions and concerns were addressed. Topical Sulfur Applications Pregnancy And Lactation Text: This medication is Pregnancy Category C and has an unknown safety profile during pregnancy. It is unknown if this topical medication is excreted in breast milk. Dapsone Pregnancy And Lactation Text: This medication is Pregnancy Category C and is not considered safe during pregnancy or breast feeding. Azithromycin Counseling:  I discussed with the patient the risks of azithromycin including but not limited to GI upset, allergic reaction, drug rash, diarrhea, and yeast infections. Doxycycline Pregnancy And Lactation Text: This medication is Pregnancy Category D and not consider safe during pregnancy. It is also excreted in breast milk but is considered safe for shorter treatment courses. Minocycline Counseling: Patient advised regarding possible photosensitivity and discoloration of the teeth, skin, lips, tongue and gums.  Patient instructed to avoid sunlight, if possible.  When exposed to sunlight, patients should wear protective clothing, sunglasses, and sunscreen.  The patient was instructed to call the office immediately if the following severe adverse effects occur:  hearing changes, easy bruising/bleeding, severe headache, or vision changes.  The patient verbalized understanding of the proper use and possible adverse effects of minocycline.  All of the patient's questions and concerns were addressed. Bactrim Counseling:  I discussed with the patient the risks of sulfa antibiotics including but not limited to GI upset, allergic reaction, drug rash, diarrhea, dizziness, photosensitivity, and yeast infections.  Rarely, more serious reactions can occur including but not limited to aplastic anemia, agranulocytosis, methemoglobinemia, blood dyscrasias, liver or kidney failure, lung infiltrates or desquamative/blistering drug rashes. Aklief Pregnancy And Lactation Text: It is unknown if this medication is safe to use during pregnancy.  It is unknown if this medication is excreted in breast milk.  Breastfeeding women should use the topical cream on the smallest area of the skin for the shortest time needed while breastfeeding.  Do not apply to nipple and areola. Tazorac Counseling:  Patient advised that medication is irritating and drying.  Patient may need to apply sparingly and wash off after an hour before eventually leaving it on overnight.  The patient verbalized understanding of the proper use and possible adverse effects of tazorac.  All of the patient's questions and concerns were addressed. Winlevi Pregnancy And Lactation Text: This medication is considered safe during pregnancy and breastfeeding. Erythromycin Pregnancy And Lactation Text: This medication is Pregnancy Category B and is considered safe during pregnancy. It is also excreted in breast milk. Sarecycline Counseling: Patient advised regarding possible photosensitivity and discoloration of the teeth, skin, lips, tongue and gums.  Patient instructed to avoid sunlight, if possible.  When exposed to sunlight, patients should wear protective clothing, sunglasses, and sunscreen.  The patient was instructed to call the office immediately if the following severe adverse effects occur:  hearing changes, easy bruising/bleeding, severe headache, or vision changes.  The patient verbalized understanding of the proper use and possible adverse effects of sarecycline.  All of the patient's questions and concerns were addressed. Birth Control Pills Counseling: Birth Control Pill Counseling: I discussed with the patient the potential side effects of OCPs including but not limited to increased risk of stroke, heart attack, thrombophlebitis, deep venous thrombosis, hepatic adenomas, breast changes, GI upset, headaches, and depression.  The patient verbalized understanding of the proper use and possible adverse effects of OCPs. All of the patient's questions and concerns were addressed.

## 2023-07-07 ENCOUNTER — TELEPHONE (OUTPATIENT)
Dept: INFECTIOUS DISEASES | Facility: CLINIC | Age: 68
End: 2023-07-07

## 2023-07-07 ENCOUNTER — HOSPITAL ENCOUNTER (OUTPATIENT)
Dept: INFUSION CENTER | Facility: CLINIC | Age: 68
End: 2023-07-07
Payer: MEDICARE

## 2023-07-07 ENCOUNTER — HOME CARE VISIT (OUTPATIENT)
Dept: HOME HEALTH SERVICES | Facility: HOME HEALTHCARE | Age: 68
End: 2023-07-07
Payer: MEDICARE

## 2023-07-07 VITALS
HEART RATE: 82 BPM | OXYGEN SATURATION: 97 % | RESPIRATION RATE: 16 BRPM | TEMPERATURE: 97.5 F | DIASTOLIC BLOOD PRESSURE: 68 MMHG | SYSTOLIC BLOOD PRESSURE: 130 MMHG

## 2023-07-07 VITALS
RESPIRATION RATE: 18 BRPM | DIASTOLIC BLOOD PRESSURE: 67 MMHG | SYSTOLIC BLOOD PRESSURE: 140 MMHG | TEMPERATURE: 97 F | HEART RATE: 79 BPM

## 2023-07-07 DIAGNOSIS — T81.49XA POSTOPERATIVE CELLULITIS OF SURGICAL WOUND: Primary | ICD-10-CM

## 2023-07-07 PROCEDURE — 36593 DECLOT VASCULAR DEVICE: CPT

## 2023-07-07 PROCEDURE — G0299 HHS/HOSPICE OF RN EA 15 MIN: HCPCS

## 2023-07-07 RX ADMIN — ALTEPLASE 2 MG: 2.2 INJECTION, POWDER, LYOPHILIZED, FOR SOLUTION INTRAVENOUS at 14:27

## 2023-07-07 NOTE — TELEPHONE ENCOUNTER
Received call from Saurav العلي ([phone]) that patient PICC line is resistant to flush. There is no blood return. Patient on vancomycin. [facility] can see anahijeffrey at 2 PM. Jose Manuel Grandchild aware and will contact patient to notify him of this as he is expecting her call.

## 2023-07-07 NOTE — PROGRESS NOTES
Pt here for TPA for PICC line. TPA indwelled for 90 minutes. Blood return noted after 90 minutes. Flushed with 20 cc of saline. Patient walked out in stable condition.

## 2023-07-10 ENCOUNTER — APPOINTMENT (OUTPATIENT)
Dept: LAB | Facility: MEDICAL CENTER | Age: 68
End: 2023-07-10
Payer: MEDICARE

## 2023-07-10 ENCOUNTER — HOME CARE VISIT (OUTPATIENT)
Dept: HOME HEALTH SERVICES | Facility: HOME HEALTHCARE | Age: 68
End: 2023-07-10
Payer: MEDICARE

## 2023-07-10 VITALS
RESPIRATION RATE: 16 BRPM | HEART RATE: 76 BPM | OXYGEN SATURATION: 99 % | SYSTOLIC BLOOD PRESSURE: 124 MMHG | DIASTOLIC BLOOD PRESSURE: 72 MMHG | TEMPERATURE: 97.8 F

## 2023-07-10 LAB
BASOPHILS # BLD AUTO: 0.03 THOUSANDS/ÂΜL (ref 0–0.1)
BASOPHILS NFR BLD AUTO: 1 % (ref 0–1)
CREAT SERPL-MCNC: 1.14 MG/DL (ref 0.6–1.3)
EOSINOPHIL # BLD AUTO: 0.22 THOUSAND/ÂΜL (ref 0–0.61)
EOSINOPHIL NFR BLD AUTO: 8 % (ref 0–6)
ERYTHROCYTE [DISTWIDTH] IN BLOOD BY AUTOMATED COUNT: 16.4 % (ref 11.6–15.1)
GFR SERPL CREATININE-BSD FRML MDRD: 65 ML/MIN/1.73SQ M
HCT VFR BLD AUTO: 27.6 % (ref 36.5–49.3)
HGB BLD-MCNC: 8.3 G/DL (ref 12–17)
IMM GRANULOCYTES # BLD AUTO: 0.01 THOUSAND/UL (ref 0–0.2)
IMM GRANULOCYTES NFR BLD AUTO: 0 % (ref 0–2)
LYMPHOCYTES # BLD AUTO: 0.32 THOUSANDS/ÂΜL (ref 0.6–4.47)
LYMPHOCYTES NFR BLD AUTO: 11 % (ref 14–44)
MCH RBC QN AUTO: 26.2 PG (ref 26.8–34.3)
MCHC RBC AUTO-ENTMCNC: 30.1 G/DL (ref 31.4–37.4)
MCV RBC AUTO: 87 FL (ref 82–98)
MONOCYTES # BLD AUTO: 0.34 THOUSAND/ÂΜL (ref 0.17–1.22)
MONOCYTES NFR BLD AUTO: 12 % (ref 4–12)
NEUTROPHILS # BLD AUTO: 2.01 THOUSANDS/ÂΜL (ref 1.85–7.62)
NEUTS SEG NFR BLD AUTO: 68 % (ref 43–75)
NRBC BLD AUTO-RTO: 0 /100 WBCS
PLATELET # BLD AUTO: 88 THOUSANDS/UL (ref 149–390)
RBC # BLD AUTO: 3.17 MILLION/UL (ref 3.88–5.62)
VANCOMYCIN TROUGH SERPL-MCNC: 15.5 UG/ML (ref 10–20)
WBC # BLD AUTO: 2.93 THOUSAND/UL (ref 4.31–10.16)

## 2023-07-10 PROCEDURE — G0299 HHS/HOSPICE OF RN EA 15 MIN: HCPCS

## 2023-07-12 ENCOUNTER — OFFICE VISIT (OUTPATIENT)
Dept: INFECTIOUS DISEASES | Facility: CLINIC | Age: 68
End: 2023-07-12
Payer: MEDICARE

## 2023-07-12 VITALS
DIASTOLIC BLOOD PRESSURE: 75 MMHG | TEMPERATURE: 98.2 F | HEIGHT: 71 IN | RESPIRATION RATE: 18 BRPM | HEART RATE: 71 BPM | SYSTOLIC BLOOD PRESSURE: 135 MMHG | BODY MASS INDEX: 36.73 KG/M2 | OXYGEN SATURATION: 98 % | WEIGHT: 262.35 LBS

## 2023-07-12 DIAGNOSIS — Z96.619 INFECTION OF PROSTHETIC SHOULDER JOINT, SUBSEQUENT ENCOUNTER: Primary | ICD-10-CM

## 2023-07-12 DIAGNOSIS — N17.9 AKI (ACUTE KIDNEY INJURY) (HCC): ICD-10-CM

## 2023-07-12 DIAGNOSIS — T84.59XD INFECTION OF PROSTHETIC SHOULDER JOINT, SUBSEQUENT ENCOUNTER: Primary | ICD-10-CM

## 2023-07-12 PROCEDURE — 99214 OFFICE O/P EST MOD 30 MIN: CPT | Performed by: STUDENT IN AN ORGANIZED HEALTH CARE EDUCATION/TRAINING PROGRAM

## 2023-07-12 NOTE — PROGRESS NOTES
Outpatient Progress Note - Infectious Disease   Margaux Underwood 69 y.o. male MRN: [id number]  Unit/Bed#:  Encounter: [id number]      Impression/Plan:    1. 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 Patient initial underwent arthroplasty on 4/4/2023. Post operatively he developed purulent weeping from the site and shoulder imaging revealed a fluid collection communicating to the joint space. Cultures from I&D 5/2/2023 grew coagulase-negative staph and alphahemolytic strep in broth only, as well as growth of cutibacterium acnes and avidum. He then underwent L shoulder resection arthroplasty with insertion of antibiotic spacer on 6/13/2023. OR cultures grew Cutibacterium avidum and acnes, MRSE, and micrococcus luteus. PICC was placed and patient was placed on Daptomycin to complete a 6-week course of IV antibiotics after antibiotic spacer, through 7/28/2023. Patient was readmitted with ABILIO, urinary retention and concern for ATN. There was some concern for ATN related to daptomycin so antibiotics were switched to IV vancomycin which she is now tolerating. Recent labs show vancomycin trough within goal and creatinine has improved to 1. 14. The patient's left shoulder incision has healed and he is overall doing well without signs of ongoing infection.  -continue IV vancomycin 2 g every 24 hours through 7/28/2023 for 6 weeks of IV antibiotic treatment  -Goal Vancomycin  and trough goal 11.4-17. 2. last trough 15.5  -weekly CBCD, creatinine, and vancomycin trough while on IV antibiotic  -continue follow up with orthopedic surgery  -RUE PICC to be removed by home RN after final dose of IV antibiotic on 7/28/2023  -Orthopedic surgery planning 2-week antibiotic free holiday followed by lab work and joint arthrocentesis to evaluate for clearance of infection prior to hardware replacement     2. ABILIO. Baseline creatinine 1-1.2. Patient readmitted with ABILIO with peak creatinine >4. Likely due to ATN, possibly related to daptomycin. Patient also had urinary retention. Daptomycin switched to IV vancomycin. Creatinine continues to improve back to baseline now 1.14.  -Monitor weekly creatinine   -Dose adjust antibiotics for creatinine clearance as needed     3. Chronic leukopenia and thrombocytopenia. Likely due to patient's alcoholic cirrhosis. CBCD currently stable.  -resume outpatient GI and hematology follow up with [facility] [place] [facility]     4. Type 2 diabetes mellitus with long term insulin use. Patient's last HbA1c was 8.4% on 3/20/2023. Elevated blood glucose is risk factor for infection and complications with healing. Recommend tight glycemic control     5. Obesity. BMI = 35.54.   -dose adjust antibiotics for patient weight as needed      Above management plan discussed with the patient and his wife. ID follow-up in 2 weeks. Antibiotics:  Vancomycin 2g every 24 hours    Subjective: The patient presents for follow up for left shoulder PJI. He was admitted to [address] [place] [address] 6/11 to 6/19 with left shoulder prosthetic joint infection. Status post left shoulder resection arthroplasty with insertion of antibiotic spacer 6/13/2023.  Tissue cultures grew cutibacterium avidum and acnes in multiple cultures, Staphylococcus epidermidis (MRSE) in broth only, 1 colony micrococcus luteus. The patient was discharged on IV daptomycin but returned to the [address] [place] [address] ED the following day since he had not urinated for over 12 hours. He was admitted with ABILIO with creatinine 2.29 on admission with further increased to 4.78. There was concern for ATN and possible complication of daptomycin therapy. Seen by ID transitioned to IV vancomycin. Creatinine gradually improved and he was discharged 6/28/23 on vancomycin 2 g every 24 hours to complete a 6-week antibiotic course as previously planned through 7/28/2023.   Patient also developed a fungal rash to his shoulder dressing due to moisture and was treated with a 7-day course of oral fluconazole. The patient presents for post hospital follow-up. He is overall doing well at discharge. Creatinine has improved to 1.14. He denies fever, chills. The patient has lower extremity swelling with worsening over the course of the day but he feels this is improving since discharge. He does have pain in the left shoulder but the incision has healed and there is no surrounding erythema or drainage. Patient is going on vacation to [place] 7/29/2023 after he completes his antibiotics. ROS:  A complete review of systems is negative other than that noted above in the subjective    Followup portions patient history reviewed and updated as: Allergies, current medications, past medical history, past social history, past surgical history, and the problem list    Objective:  Vitals:  Vitals:    07/12/23 1042   Weight: 119 kg (262 lb 5.6 oz)   Height: 5' 11" (1.803 m)       Physical Exam:   General Appearance:  Alert, interactive, appearing well, nontoxic, no acute distress. Throat: Oropharynx moist without lesions. Lungs:   Clear to auscultation bilaterally; no audible wheezes, rhonchi or rales; respirations unlabored   Heart:  RRR; no murmur, rub or gallop   Abdomen:   Soft, non-tender, non-distended, positive bowel sounds. Extremities: No clubbing, cyanosis or edema. Right upper extremity PICC in place. Left shoulder incision without dehiscence or drainage.   Lower extremity edema   Skin:  Lower extremity edema and venous stasis changes       Labs, Imaging, & Other studies:   All pertinent labs and imaging studies were personally reviewed    Labs:  Lab Results   Component Value Date    K 3.5 06/28/2023     (H) 06/28/2023    CO2 26 06/28/2023    BUN 23 06/28/2023    CREATININE 1.14 07/10/2023    GLUF 153 (H) 03/20/2023    CALCIUM 8.3 (L) 06/28/2023    CORRECTEDCA 9.1 06/28/2023    AST 40 (H) 06/28/2023    ALT 27 06/28/2023    ALKPHOS 175 (H) 06/28/2023    EGFR 65 07/10/2023     Lab Results   Component Value Date    WBC 2.93 (L) 07/10/2023    HGB 8.3 (L) 07/10/2023    HCT 27.6 (L) 07/10/2023    MCV 87 07/10/2023    PLT 88 (L) 07/10/2023   No results found for: "SEDRATE"    Micro:    Left shoulder tissue cultures-  cutibacterium avidum and acnes in multiple cultures, Staphylococcus epidermidis (MRSE) in broth only, 1 colony micrococcus luteus    Imaging:    X-ray left shoulder-antibiotic spacer in place

## 2023-07-12 NOTE — PATIENT INSTRUCTIONS
Continue vancomycin 2g every 24 hours through 7/28/23 to complete 6 weeks  Continue weekly labs  Follow up in 2 weeks

## 2023-07-17 ENCOUNTER — APPOINTMENT (OUTPATIENT)
Dept: LAB | Facility: MEDICAL CENTER | Age: 68
End: 2023-07-17
Payer: MEDICARE

## 2023-07-17 ENCOUNTER — HOME CARE VISIT (OUTPATIENT)
Dept: HOME HEALTH SERVICES | Facility: HOME HEALTHCARE | Age: 68
End: 2023-07-17
Payer: MEDICARE

## 2023-07-17 VITALS
TEMPERATURE: 98 F | SYSTOLIC BLOOD PRESSURE: 120 MMHG | DIASTOLIC BLOOD PRESSURE: 62 MMHG | OXYGEN SATURATION: 98 % | HEART RATE: 78 BPM | RESPIRATION RATE: 16 BRPM

## 2023-07-17 PROCEDURE — G0299 HHS/HOSPICE OF RN EA 15 MIN: HCPCS

## 2023-07-18 ENCOUNTER — OFFICE VISIT (OUTPATIENT)
Dept: PODIATRY | Facility: CLINIC | Age: 68
End: 2023-07-18
Payer: MEDICARE

## 2023-07-18 VITALS
WEIGHT: 262 LBS | SYSTOLIC BLOOD PRESSURE: 155 MMHG | BODY MASS INDEX: 36.68 KG/M2 | HEART RATE: 60 BPM | HEIGHT: 71 IN | DIASTOLIC BLOOD PRESSURE: 75 MMHG

## 2023-07-18 DIAGNOSIS — E11.40 TYPE 2 DIABETES MELLITUS WITH DIABETIC NEUROPATHY, WITH LONG-TERM CURRENT USE OF INSULIN (HCC): ICD-10-CM

## 2023-07-18 DIAGNOSIS — L97.521 ULCER OF LEFT FOOT, LIMITED TO BREAKDOWN OF SKIN (HCC): Primary | ICD-10-CM

## 2023-07-18 DIAGNOSIS — Z79.4 TYPE 2 DIABETES MELLITUS WITH DIABETIC NEUROPATHY, WITH LONG-TERM CURRENT USE OF INSULIN (HCC): ICD-10-CM

## 2023-07-18 PROCEDURE — 99213 OFFICE O/P EST LOW 20 MIN: CPT | Performed by: PODIATRIST

## 2023-07-18 NOTE — PROGRESS NOTES
Patient ID: Chhaya Liang is a 69 y.o. male Date of Birth 1955       Assessment:    No problem-specific Assessment & Plan notes found for this encounter. Diagnoses and all orders for this visit:    Ulcer of left foot, limited to breakdown of skin ([address])    Type 2 diabetes mellitus with diabetic neuropathy, with long-term current use of insulin ([address])          Procedures      Plan:  1. Reviewed medical records. Reviewed recent blood work. 2. Keratotic lesions removed. Instructed horse shoe pad with protective dressing. 3. Instructed him to call  to make an appt for DM shoes. 4. Educated disease prevention and risks related to diabetes. Educated proper daily foot care and exam.  Instructed proper skin care / protection and footwear. Instructed to identify any signs of infection and related foot problem. 5. RA in 3 weeks. I have spent a total time of 20 minutes on 07/18/23 in caring for this patient including Prognosis, Patient and family education, Risk factor reductions, Counseling / Coordination of care, Documenting in the medical record and Reviewing / ordering tests, medicine, procedures  . Subjective:        RYAN Kirkland presents for evaluation of left foot. He feels well. No drainage. No pain. He had a wound on left great toe while he was in hospital.  He related this to rubbing on the bedrail. BS under control. He is on Vancomycon due to infection in the shoulder after his surgery. He did not call  yet.       The following portions of the patient's history were reviewed and updated as appropriate: allergies, current medications, past family history, past medical history, past social history, past surgical history and problem list.      PAST MEDICAL HISTORY:  Past Medical History:   Diagnosis Date   • Arrhythmia    • CPAP (continuous positive airway pressure) dependence    • Diabetes mellitus ([address])    • History of echocardiogram 11/14/2017    showed EF of 50-55 percentWith moderate LVH and left ventricle diastolic dysfunction. Left atrium was moderately enlarged. Trace MR noted. • History of Holter monitoring 11/21/2017    showed baseline rhythm of sinus origin with an average heart it of 61 bpm. The lowest heart rate was 49 and the highest heart rate was 10 8 bpm. There were rare single VPCs, and frequent PACs representing 3.2% of total beats. There were several episodes of sinus arrhythmias with sinus bradycardia and heart rate ranging from 40-90 bpm. No sustained dysrhythmias, or pauses noted.  The patient did not   • Hypertension    • Liver disease    • Obese    • Sleep apnea        PAST SURGICAL HISTORY:  Past Surgical History:   Procedure Laterality Date   • CATARACT EXTRACTION     • EYE SURGERY Left 1997   • HERNIA REPAIR  [id number]   • KNEE ARTHROPLASTY Right 2008   • MI ARTHROPLASTY GLENOHUMERAL JOINT TOTAL SHOULDER Left 4/4/2023    Procedure: ARTHROPLASTY SHOULDER REVERSE;  Surgeon: Olvin Dobbs MD;  [facility];  Service: Orthopedics   • MI JARED SHOULDER ARTHRPLSTY HUMERAL/GLENOID COMPNT Left 6/13/2023    Procedure: ARTHROPLASTY SHOULDER REVISION;  Surgeon: Chadd Taylor;  [facility];  Service: Orthopedics   • SHOULDER SURGERY Right 2002   • WOUND DEBRIDEMENT Left 5/2/2023    Procedure: INCISION AND DRAINAGE (I&D) EXTREMITY, vac placement;  Surgeon: Olvin Dobbs MD;  [facility];  Service: Orthopedics        ALLERGIES:  Sulfa antibiotics and Daptomycin    MEDICATIONS:  Current Outpatient Medications   Medication Sig Dispense Refill   • furosemide (LASIX) 20 mg tablet Take 1 tablet (20 mg total) by mouth every other day 15 tablet 0   • glimepiride (AMARYL) 4 mg tablet Take 4 mg by mouth 2 (two) times a day     • insulin glargine (LANTUS) 100 units/mL subcutaneous injection Inject 20 Units under the skin daily at bedtime 10 mL 0   • Insulin Pen Needle (BD Pen Needle Nayla 2nd Gen) 32G X 4 MM MISC For use with insulin pen. Pharmacy may dispense brand covered by insurance. 100 each 0   • Multiple Vitamin (multivitamin) capsule Take 1 capsule by mouth daily 21 capsule 0   • Sodium Chloride Flush (NORMAL SALINE FLUSH IV) Inject 10 mL into a catheter in a vein in the morning. Flush pre antibiotic and post antibiotic and as needed for line patency  Indications: line patency     • tamsulosin (FLOMAX) 0.4 mg Take 1 capsule (0.4 mg total) by mouth daily with dinner 30 capsule 0   • traZODone (DESYREL) 50 mg tablet Take 50 mg by mouth daily at bedtime bedtime     • vancomycin 2,000 mg in sodium chloride 0.9 % 500 mL IVPB Inject 2,000 mg into a catheter in a vein over 120 minutes at 250 mL/hr every 24 hours (Patient taking differently: Inject 2,000 mg into a catheter in a vein every 24 hours via eclipse ball)  0     No current facility-administered medications for this visit. SOCIAL HISTORY:  Social History     Socioeconomic History   • Marital status: /Civil Union     Spouse name: None   • Number of children: 2   • Years of education: 16   • Highest education level: Some college, no degree   Occupational History   • None   Tobacco Use   • Smoking status: Former     Packs/day: 0.50     Years: 20.00     Total pack years: 10.00     Types: Cigarettes     Quit date:      Years since quittin.5   • Smokeless tobacco: Never   Vaping Use   • Vaping Use: Never used   Substance and Sexual Activity   • Alcohol use: Not Currently     Comment: quit    • Drug use: Never   • Sexual activity: Not Currently   Other Topics Concern   • None   Social History Narrative    · Most recent tobacco use screenin2018      · Do you currently or have you served in the [address] [place] [address] [employer]:   No      · Live alone or with others:   with others      · High blood pressure: Yes      · Exercise level:   Occasional      · Overweight:   Yes      · Obese:    Yes      · Diabetes:   Yes      Social Determinants of Health     Financial Resource Strain: Not on file   Food Insecurity: No Food Insecurity (6/23/2023)    Hunger Vital Sign    • Worried About Running Out of Food in the Last Year: Never true    • Ran Out of Food in the Last Year: Never true   Transportation Needs: No Transportation Needs (6/23/2023)    PRAPARE - Transportation    • Lack of Transportation (Medical): No    • Lack of Transportation (Non-Medical): No   Physical Activity: Not on file   Stress: Not on file   Social Connections: Not on file   Intimate Partner Violence: Not on file   Housing Stability: Low Risk  (6/23/2023)    Housing Stability Vital Sign    • Unable to Pay for Housing in the Last Year: No    • Number of Places Lived in the Last Year: 1    • Unstable Housing in the Last Year: No      Review of Systems   Constitutional: Negative for chills and fever. Respiratory: Negative for shortness of breath. Cardiovascular: Negative for chest pain. Gastrointestinal: Negative for diarrhea, nausea and vomiting. Musculoskeletal: Negative for gait problem. Neurological: Positive for numbness. Objective:            /75   Pulse 60   Ht 5' 11" (1.803 m)   Wt 119 kg (262 lb)   BMI 36.54 kg/m²     Physical Exam  Vitals reviewed. Constitutional:       General: He is not in acute distress. Appearance: Normal appearance. He is not ill-appearing or toxic-appearing. Cardiovascular:      Rate and Rhythm: Normal rate and regular rhythm. Pulses: Normal pulses. Pulmonary:      Effort: Pulmonary effort is normal. No respiratory distress. Musculoskeletal:         General: Deformity present. No tenderness or signs of injury. Skin:     General: Skin is warm. Coloration: Skin is not cyanotic or mottled. Findings: No abscess, erythema or rash. Nails: There is no clubbing. Comments: Pre-ulcerative keratosis / dry blood on left submet 2 and left great toe. No active ulcer. No drainage. No signs of infection.      Neurological: General: No focal deficit present. Mental Status: He is alert and oriented to person, place, and time. Cranial Nerves: No cranial nerve deficit. Sensory: Sensory deficit present. Coordination: Coordination normal.   Psychiatric:         Mood and Affect: Mood normal.         Behavior: Behavior normal.         Thought Content:  Thought content normal.         Judgment: Judgment normal.

## 2023-07-24 ENCOUNTER — APPOINTMENT (OUTPATIENT)
Dept: LAB | Facility: MEDICAL CENTER | Age: 68
End: 2023-07-24
Payer: MEDICARE

## 2023-07-24 ENCOUNTER — HOME CARE VISIT (OUTPATIENT)
Dept: HOME HEALTH SERVICES | Facility: HOME HEALTHCARE | Age: 68
End: 2023-07-24
Payer: MEDICARE

## 2023-07-24 VITALS
DIASTOLIC BLOOD PRESSURE: 70 MMHG | SYSTOLIC BLOOD PRESSURE: 110 MMHG | HEART RATE: 78 BPM | RESPIRATION RATE: 18 BRPM | OXYGEN SATURATION: 99 % | TEMPERATURE: 97.3 F

## 2023-07-24 PROCEDURE — G0299 HHS/HOSPICE OF RN EA 15 MIN: HCPCS

## 2023-07-25 ENCOUNTER — OFFICE VISIT (OUTPATIENT)
Dept: OBGYN CLINIC | Facility: MEDICAL CENTER | Age: 68
End: 2023-07-25

## 2023-07-25 ENCOUNTER — OFFICE VISIT (OUTPATIENT)
Dept: INFECTIOUS DISEASES | Facility: CLINIC | Age: 68
End: 2023-07-25
Payer: MEDICARE

## 2023-07-25 VITALS
BODY MASS INDEX: 34.3 KG/M2 | WEIGHT: 245 LBS | OXYGEN SATURATION: 97 % | HEIGHT: 71 IN | HEART RATE: 82 BPM | TEMPERATURE: 97.8 F | DIASTOLIC BLOOD PRESSURE: 70 MMHG | RESPIRATION RATE: 18 BRPM | SYSTOLIC BLOOD PRESSURE: 136 MMHG

## 2023-07-25 VITALS
WEIGHT: 245.2 LBS | HEIGHT: 71 IN | SYSTOLIC BLOOD PRESSURE: 139 MMHG | BODY MASS INDEX: 34.33 KG/M2 | DIASTOLIC BLOOD PRESSURE: 76 MMHG | HEART RATE: 89 BPM

## 2023-07-25 DIAGNOSIS — N17.9 AKI (ACUTE KIDNEY INJURY) (HCC): ICD-10-CM

## 2023-07-25 DIAGNOSIS — Z96.619 INFECTION OF PROSTHETIC SHOULDER JOINT, SUBSEQUENT ENCOUNTER: Primary | ICD-10-CM

## 2023-07-25 DIAGNOSIS — K70.30 ALCOHOLIC CIRRHOSIS OF LIVER WITHOUT ASCITES (HCC): ICD-10-CM

## 2023-07-25 DIAGNOSIS — Z47.1 AFTERCARE FOLLOWING LEFT SHOULDER JOINT REPLACEMENT SURGERY: Primary | ICD-10-CM

## 2023-07-25 DIAGNOSIS — T84.59XD INFECTION OF PROSTHETIC SHOULDER JOINT, SUBSEQUENT ENCOUNTER: Primary | ICD-10-CM

## 2023-07-25 DIAGNOSIS — T81.49XA POSTOPERATIVE CELLULITIS OF SURGICAL WOUND: ICD-10-CM

## 2023-07-25 DIAGNOSIS — D61.818 PANCYTOPENIA (HCC): ICD-10-CM

## 2023-07-25 DIAGNOSIS — Z96.612 AFTERCARE FOLLOWING LEFT SHOULDER JOINT REPLACEMENT SURGERY: Primary | ICD-10-CM

## 2023-07-25 PROCEDURE — 99024 POSTOP FOLLOW-UP VISIT: CPT | Performed by: ORTHOPAEDIC SURGERY

## 2023-07-25 PROCEDURE — 99214 OFFICE O/P EST MOD 30 MIN: CPT | Performed by: PHYSICIAN ASSISTANT

## 2023-07-25 NOTE — PROGRESS NOTES
[facility] SPECIALISTS [facility] [id number]       Samira Henning  [id number]  1955    ORTHOPAEDIC SURGERY OUTPATIENT NOTE  7/25/2023      HISTORY:  69 y.o. male  Presents for suture removal from healed incision site that is coming out. Otherwise no new issues or complaints from prior visit. Past Medical History:   Diagnosis Date   • Arrhythmia    • CPAP (continuous positive airway pressure) dependence    • Diabetes mellitus ([address])    • History of echocardiogram 11/14/2017    showed EF of 50-55 percentWith moderate LVH and left ventricle diastolic dysfunction. Left atrium was moderately enlarged. Trace MR noted. • History of Holter monitoring 11/21/2017    showed baseline rhythm of sinus origin with an average heart it of 61 bpm. The lowest heart rate was 49 and the highest heart rate was 10 8 bpm. There were rare single VPCs, and frequent PACs representing 3.2% of total beats. There were several episodes of sinus arrhythmias with sinus bradycardia and heart rate ranging from 40-90 bpm. No sustained dysrhythmias, or pauses noted.  The patient did not   • Hypertension    • Liver disease    • Obese    • Sleep apnea        Past Surgical History:   Procedure Laterality Date   • CATARACT EXTRACTION     • EYE SURGERY Left 1997   • HERNIA REPAIR  [id number]   • KNEE ARTHROPLASTY Right 2008   • MI ARTHROPLASTY GLENOHUMERAL JOINT TOTAL SHOULDER Left 4/4/2023    Procedure: ARTHROPLASTY SHOULDER REVERSE;  Surgeon: Loyd Clark MD;  [facility];  Service: Orthopedics   • MI JARED SHOULDER ARTHRPLSTY HUMERAL/GLENOID COMPNT Left 6/13/2023    Procedure: ARTHROPLASTY SHOULDER REVISION;  Surgeon: Peter Barger;  [facility];  Service: Orthopedics   • SHOULDER SURGERY Right 2002   • WOUND DEBRIDEMENT Left 5/2/2023    Procedure: INCISION AND DRAINAGE (I&D) EXTREMITY, vac placement;  Surgeon: Loyd Clark MD; [facility];  Service: Orthopedics       Social History     Socioeconomic History   • Marital status: /Civil Union     Spouse name: Not on file   • Number of children: 2   • Years of education: 16   • Highest education level: Some college, no degree   Occupational History   • Not on file   Tobacco Use   • Smoking status: Former     Packs/day: 0.50     Years: 20.00     Total pack years: 10.00     Types: Cigarettes     Quit date:      Years since quittin.5   • Smokeless tobacco: Never   Vaping Use   • Vaping Use: Never used   Substance and Sexual Activity   • Alcohol use: Not Currently     Comment: quit    • Drug use: Never   • Sexual activity: Not Currently   Other Topics Concern   • Not on file   Social History Narrative    · Most recent tobacco use screenin2018      · Do you currently or have you served in the [address] [place] [address] [employer]:   No      · Live alone or with others:   with others      · High blood pressure: Yes      · Exercise level:   Occasional      · Overweight:   Yes      · Obese: Yes      · Diabetes:   Yes      Social Determinants of Health     Financial Resource Strain: Not on file   Food Insecurity: No Food Insecurity (2023)    Hunger Vital Sign    • Worried About Running Out of Food in the Last Year: Never true    • Ran Out of Food in the Last Year: Never true   Transportation Needs: No Transportation Needs (2023)    PRAPARE - Transportation    • Lack of Transportation (Medical): No    • Lack of Transportation (Non-Medical):  No   Physical Activity: Not on file   Stress: Not on file   Social Connections: Not on file   Intimate Partner Violence: Not on file   Housing Stability: Low Risk  (2023)    Housing Stability Vital Sign    • Unable to Pay for Housing in the Last Year: No    • Number of Places Lived in the Last Year: 1    • Unstable Housing in the Last Year: No       Family History   Problem Relation Age of Onset   • Diabetes Mother    • Supraventricular tachycardia Mother    • COPD Father    • Heart disease Father    • Other Father         Sepsis; related to UTI with complicating cardiac problems   • Heart disease Paternal Grandmother         Patient's Medications   New Prescriptions    No medications on file   Previous Medications    FUROSEMIDE (LASIX) 20 MG TABLET    Take 1 tablet (20 mg total) by mouth every other day    GLIMEPIRIDE (AMARYL) 4 MG TABLET    Take 4 mg by mouth 2 (two) times a day    INSULIN GLARGINE (LANTUS) 100 UNITS/ML SUBCUTANEOUS INJECTION    Inject 20 Units under the skin daily at bedtime    INSULIN PEN NEEDLE (BD PEN NEEDLE TERRA 2ND GEN) 32G X 4 MM MISC    For use with insulin pen. Pharmacy may dispense brand covered by insurance. MULTIPLE VITAMIN (MULTIVITAMIN) CAPSULE    Take 1 capsule by mouth daily    SODIUM CHLORIDE FLUSH (NORMAL SALINE FLUSH IV)    Inject 10 mL into a catheter in a vein in the morning. Flush pre antibiotic and post antibiotic and as needed for line patency  Indications: line patency    TAMSULOSIN (FLOMAX) 0.4 MG    Take 1 capsule (0.4 mg total) by mouth daily with dinner    TRAZODONE (DESYREL) 50 MG TABLET    Take 50 mg by mouth daily at bedtime bedtime    VANCOMYCIN 2,000 MG IN SODIUM CHLORIDE 0.9 % 500 ML IVPB    Inject 2,000 mg into a catheter in a vein over 120 minutes at 250 mL/hr every 24 hours   Modified Medications    No medications on file   Discontinued Medications    No medications on file       Allergies   Allergen Reactions   • Sulfa Antibiotics Hives   • Daptomycin Other (See Comments)     Possibly contributed to ABILIO on 6/2023 admission         /76   Pulse 89   Ht 5' 11" (1.803 m)   Wt 111 kg (245 lb 3.2 oz)   BMI 34.20 kg/m²      REVIEW OF SYSTEMS:  Constitutional: Negative. HEENT: Negative. Respiratory: Negative. Skin: Negative. Neurological: Negative. Psychiatric/Behavioral: Negative.   Musculoskeletal: Negative except for that mentioned in the HPI.    @[facility]@     PHYSICAL EXAM:  Left shoulder: healed surgical scar. No signs of infection. No effusion. Small nylon suture seen distal aspect of surgical scar. IMAGING:  None taken in the office today. ASSESSMENT AND PLAN:  69 y.o. male  S/p left shoulder abx spacer placement with retained surgical scar suture    The suture was removed today in the office with no complications. Patient is being compliant with picc line and ID recs. Will see the patient back at his regularly scheduled appointment date to repeat labs and discuss possible replantation of his shoulder.

## 2023-07-25 NOTE — ASSESSMENT & PLAN NOTE
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 Patient initial underwent arthroplasty on 4/4/2023. Post operatively he developed purulent weeping from the site and shoulder imaging revealed a fluid collection communicating to the joint space. Cultures from I&D 5/2/2023 grew coagulase-negative staph and alphahemolytic strep in broth only, as well as growth of cutibacterium acnes and avidum. He then underwent L shoulder resection arthroplasty with insertion of antibiotic spacer on 6/13/2023. OR cultures grew Cutibacterium avidum and acnes, MRSE, and micrococcus luteus. PICC was placed and patient was placed on Daptomycin to complete a 6-week course of IV antibiotics after antibiotic spacer, through 7/28/2023. Patient doing well on the vancomycin. No new complaints today. Labs stable for patient. Cr now back to normal.      Plan  - continue vancomycin 2 grams IV every 24 hours as ordered through 7/28/2023  - PICC can be removed after last dose infused  - continue to follow up with Ortho - plan appears to be a 2 week holiday off antibiotics followed by arthrocentesis, if clear he will then have joint replaced.    - no further ID follow up scheduled at this time

## 2023-07-25 NOTE — PROGRESS NOTES
Assessment/Plan:    Postoperative infection of surgical wound  Left shoulder surgical site infection with abscess and prosthetic joint infection. Patient initial underwent arthroplasty on 4/4/2023. Post operatively he developed purulent weeping from the site and shoulder imaging revealed a fluid collection communicating to the joint space. Cultures from I&D 5/2/2023 grew coagulase-negative staph and alphahemolytic strep in broth only, as well as growth of cutibacterium acnes and avidum. He then underwent L shoulder resection arthroplasty with insertion of antibiotic spacer on 6/13/2023. OR cultures grew Cutibacterium avidum and acnes, MRSE, and micrococcus luteus. PICC was placed and patient was placed on Daptomycin to complete a 6-week course of IV antibiotics after antibiotic spacer, through 7/28/2023. Patient doing well on the vancomycin. No new complaints today. Labs stable for patient. Cr now back to normal.      Plan  - continue vancomycin 2 grams IV every 24 hours as ordered through 7/28/2023  - PICC can be removed after last dose infused  - continue to follow up with Ortho - plan appears to be a 2 week holiday off antibiotics followed by arthrocentesis, if clear he will then have joint replaced. - no further ID follow up scheduled at this time    Alcoholic cirrhosis ([address])  Likely cause of pancytopenia    Insulin-dependent diabetes mellitus    Lab Results   Component Value Date    HGBA1C 8.4 (H) 03/20/2023       Pancytopenia (HCC)  Appears chronic. Unlikely related to the antibiotic    ABILIO (acute kidney injury) ([address])  Likely secondary to daptomycin. No resolved.   Cr back to 0.98       Diagnoses and all orders for this visit:    Infection of prosthetic shoulder joint, subsequent encounter    Postoperative infection of surgical wound    Alcoholic cirrhosis of liver without ascites ([address])    Pancytopenia ([address])    ABILIO (acute kidney injury) ([address])          Subjective:      Patient ID: Susana Lopez is a 69 y.o. male. HPI  70 y/o male presents for office follow up today regarding Left shoulder surgical site infection with abscess and prosthetic joint infection. Patient initial underwent arthroplasty on 4/4/2023. Post operatively he developed purulent weeping from the site and shoulder imaging revealed a fluid collection communicating to the joint space. Cultures from I&D 5/2/2023 grew coagulase-negative staph and alphahemolytic strep in broth only, as well as growth of cutibacterium acnes and avidum. He then underwent L shoulder resection arthroplasty with insertion of antibiotic spacer on 6/13/2023. OR cultures grew Cutibacterium avidum and acnes, MRSE, and micrococcus luteus. PICC was placed and patient was placed on Daptomycin to complete a 6-week course of IV antibiotics after antibiotic spacer, through 7/28/2023. Patient then readmitted with ABILIO. He was changed from Daptomycin to Vancomycin and was discharged back home to complete the 6 week course. He is doing well on the vancomycin. He has no new complaints today. His PICC is functioning well. He has been following with Ortho as well. The following portions of the patient's history were reviewed and updated as appropriate: allergies, current medications, past family history, past medical history, past social history, past surgical history and problem list.    Review of Systems   Constitutional: Negative for chills and fever. Respiratory: Negative for cough and shortness of breath. Gastrointestinal: Negative for abdominal pain, diarrhea, nausea and vomiting. Skin: Negative for rash. Psychiatric/Behavioral: Negative for behavioral problems and confusion. Objective:      /70   Pulse 82   Temp 97.8 °F (36.6 °C)   Resp 18   Ht 5' 11" (1.803 m)   Wt 111 kg (245 lb)   SpO2 97%   BMI 34.17 kg/m²          Physical Exam  Vitals reviewed. Constitutional:       General: He is not in acute distress.      Appearance: Normal appearance. He is not ill-appearing, toxic-appearing or diaphoretic. HENT:      Head: Normocephalic and atraumatic. Eyes:      General: No scleral icterus. Right eye: No discharge. Left eye: No discharge. Conjunctiva/sclera: Conjunctivae normal.   Cardiovascular:      Rate and Rhythm: Normal rate. Pulmonary:      Effort: Pulmonary effort is normal. No respiratory distress. Breath sounds: Normal breath sounds. No stridor. No wheezing, rhonchi or rales. Chest:      Chest wall: No tenderness. Abdominal:      General: Bowel sounds are normal. There is no distension. Palpations: Abdomen is soft. There is no mass. Tenderness: There is no abdominal tenderness. Musculoskeletal:      Comments: Left shoulder incision appears to be healing well   Skin:     General: Skin is warm and dry. Coloration: Skin is not jaundiced or pale. Findings: No erythema or rash. Comments: PICC insertion site without erythema, drainage or tenderness   Neurological:      Mental Status: He is alert and oriented to person, place, and time.    Psychiatric:         Mood and Affect: Mood normal.         Behavior: Behavior normal.           Labs:   7/24/2023  Wbc: 2.72  Hgb: 10.1  Plt: 77  ANC: 1.91  Cr: 0.95  Vanco trough: 14.6

## 2023-07-25 NOTE — PATIENT INSTRUCTIONS
Plan  - continue vancomycin 2 grams IV every 24 hours as ordered through 7/28/2023  - PICC can be removed after last dose infused  - continue to follow up with Ortho - plan appears to be a 2 week holiday off antibiotics followed by arthrocentesis, if clear he will then have joint replaced.    - no further ID follow up scheduled at this time

## 2023-07-28 ENCOUNTER — HOME CARE VISIT (OUTPATIENT)
Dept: HOME HEALTH SERVICES | Facility: HOME HEALTHCARE | Age: 68
End: 2023-07-28
Payer: MEDICARE

## 2023-07-28 ENCOUNTER — TELEPHONE (OUTPATIENT)
Dept: OBGYN CLINIC | Facility: HOSPITAL | Age: 68
End: 2023-07-28

## 2023-07-28 VITALS
HEART RATE: 76 BPM | DIASTOLIC BLOOD PRESSURE: 70 MMHG | TEMPERATURE: 97.5 F | SYSTOLIC BLOOD PRESSURE: 128 MMHG | RESPIRATION RATE: 16 BRPM | OXYGEN SATURATION: 100 %

## 2023-07-28 PROCEDURE — G0299 HHS/HOSPICE OF RN EA 15 MIN: HCPCS

## 2023-07-28 NOTE — TELEPHONE ENCOUNTER
Caller: Isael    Doctor: Sonia Moon    Reason for call: Patient calling to report that he things he has a remaining stitch at his incision that is now under the skin. Patient reports he tried to take a picture but you can't see it when he did so but states he can feel it. Patient does have his visiting nurse coming today to remove his PICC line, asking if she can address the issue?   Please advise    Call back#: [phone]

## 2023-08-03 ENCOUNTER — TELEPHONE (OUTPATIENT)
Age: 68
End: 2023-08-03

## 2023-08-03 DIAGNOSIS — E11.40 TYPE 2 DIABETES MELLITUS WITH DIABETIC NEUROPATHY, WITH LONG-TERM CURRENT USE OF INSULIN (HCC): ICD-10-CM

## 2023-08-03 DIAGNOSIS — L97.521 ULCER OF LEFT FOOT, LIMITED TO BREAKDOWN OF SKIN (HCC): Primary | ICD-10-CM

## 2023-08-03 DIAGNOSIS — Z79.4 TYPE 2 DIABETES MELLITUS WITH DIABETIC NEUROPATHY, WITH LONG-TERM CURRENT USE OF INSULIN (HCC): ICD-10-CM

## 2023-08-03 NOTE — TELEPHONE ENCOUNTER
Caller: Patient wife    Doctor: Omaira Kennedy    Reason for call:[facility] clinic is stating they need a note and a prescription for patients diabetic shoes. Can we fax these to [address]?     Call back#:[phone]

## 2023-08-15 ENCOUNTER — OFFICE VISIT (OUTPATIENT)
Dept: PODIATRY | Facility: CLINIC | Age: 68
End: 2023-08-15
Payer: MEDICARE

## 2023-08-15 VITALS
WEIGHT: 245 LBS | BODY MASS INDEX: 34.3 KG/M2 | HEIGHT: 71 IN | SYSTOLIC BLOOD PRESSURE: 148 MMHG | DIASTOLIC BLOOD PRESSURE: 76 MMHG | HEART RATE: 91 BPM

## 2023-08-15 DIAGNOSIS — Z79.4 TYPE 2 DIABETES MELLITUS WITH DIABETIC NEUROPATHY, WITH LONG-TERM CURRENT USE OF INSULIN (HCC): ICD-10-CM

## 2023-08-15 DIAGNOSIS — L97.521 ULCER OF LEFT FOOT, LIMITED TO BREAKDOWN OF SKIN (HCC): Primary | ICD-10-CM

## 2023-08-15 DIAGNOSIS — E11.40 TYPE 2 DIABETES MELLITUS WITH DIABETIC NEUROPATHY, WITH LONG-TERM CURRENT USE OF INSULIN (HCC): ICD-10-CM

## 2023-08-15 PROCEDURE — 99213 OFFICE O/P EST LOW 20 MIN: CPT | Performed by: PODIATRIST

## 2023-08-15 NOTE — PROGRESS NOTES
Patient ID: Vanessa Gupta is a 69 y.o. male Date of Birth 1955       Assessment:    No problem-specific Assessment & Plan notes found for this encounter. Diagnoses and all orders for this visit:    Ulcer of left foot, limited to breakdown of skin ([address])    Type 2 diabetes mellitus with diabetic neuropathy, with long-term current use of insulin ([address])          Procedures      Plan:  1. Reviewed medical records. Reviewed recent blood work. 2. Keratotic lesions removed. Wound looks healed. Instructed horse shoe pad with protective dressing. 3. Awaits shoes. 4. Educated disease prevention and risks related to diabetes. Educated proper daily foot care and exam.  Instructed proper skin care / protection and footwear. Instructed to identify any signs of infection and related foot problem. 5. RA in 3 weeks. I have spent a total time of 20 minutes on 08/15/23 in caring for this patient including Prognosis, Patient and family education, Risk factor reductions, Counseling / Coordination of care, Documenting in the medical record and Reviewing / ordering tests, medicine, procedures  . Subjective:        RYAN Kirkland presents for evaluation of left foot. No drainage. No pain. BS under control. No new complaint. The following portions of the patient's history were reviewed and updated as appropriate: allergies, current medications, past family history, past medical history, past social history, past surgical history and problem list.      PAST MEDICAL HISTORY:  Past Medical History:   Diagnosis Date   • Arrhythmia    • CPAP (continuous positive airway pressure) dependence    • Diabetes mellitus ([address])    • History of echocardiogram 11/14/2017    showed EF of 50-55 percentWith moderate LVH and left ventricle diastolic dysfunction. Left atrium was moderately enlarged. Trace MR noted.     • History of Holter monitoring 11/21/2017    showed baseline rhythm of sinus origin with an average heart it of 61 bpm. The lowest heart rate was 49 and the highest heart rate was 10 8 bpm. There were rare single VPCs, and frequent PACs representing 3.2% of total beats. There were several episodes of sinus arrhythmias with sinus bradycardia and heart rate ranging from 40-90 bpm. No sustained dysrhythmias, or pauses noted. The patient did not   • Hypertension    • Liver disease    • Obese    • Sleep apnea        PAST SURGICAL HISTORY:  Past Surgical History:   Procedure Laterality Date   • CATARACT EXTRACTION     • EYE SURGERY Left 1997   • HERNIA REPAIR  [id number]   • KNEE ARTHROPLASTY Right 2008   • [place] ARTHROPLASTY GLENOHUMERAL JOINT TOTAL SHOULDER Left 4/4/2023    Procedure: ARTHROPLASTY SHOULDER REVERSE;  Surgeon: Cinthya Schulz MD;  [facility];  Service: Orthopedics   • NC JARED SHOULDER ARTHRPLSTY HUMERAL/GLENOID COMPNT Left 6/13/2023    Procedure: ARTHROPLASTY SHOULDER REVISION;  Surgeon: Marii Urban;  [facility];  Service: Orthopedics   • SHOULDER SURGERY Right 2002   • WOUND DEBRIDEMENT Left 5/2/2023    Procedure: INCISION AND DRAINAGE (I&D) EXTREMITY, vac placement;  Surgeon: Cinthya Schulz MD;  [facility];  Service: Orthopedics        ALLERGIES:  Sulfa antibiotics and Daptomycin    MEDICATIONS:  Current Outpatient Medications   Medication Sig Dispense Refill   • glimepiride (AMARYL) 4 mg tablet Take 4 mg by mouth 2 (two) times a day     • insulin glargine (LANTUS) 100 units/mL subcutaneous injection Inject 20 Units under the skin daily at bedtime 10 mL 0   • Insulin Pen Needle (BD Pen Needle Nayla 2nd Gen) 32G X 4 MM MISC For use with insulin pen. Pharmacy may dispense brand covered by insurance. 100 each 0   • Multiple Vitamin (multivitamin) capsule Take 1 capsule by mouth daily 21 capsule 0   • Sodium Chloride Flush (NORMAL SALINE FLUSH IV) Inject 10 mL into a catheter in a vein in the morning.  Flush pre antibiotic and post antibiotic and as needed for line patency  Indications: line patency     • traZODone (DESYREL) 50 mg tablet Take 50 mg by mouth daily at bedtime bedtime     • furosemide (LASIX) 20 mg tablet Take 1 tablet (20 mg total) by mouth every other day 15 tablet 0   • tamsulosin (FLOMAX) 0.4 mg Take 1 capsule (0.4 mg total) by mouth daily with dinner 30 capsule 0     No current facility-administered medications for this visit. SOCIAL HISTORY:  Social History     Socioeconomic History   • Marital status: /Civil Union     Spouse name: None   • Number of children: 2   • Years of education: 16   • Highest education level: Some college, no degree   Occupational History   • None   Tobacco Use   • Smoking status: Former     Packs/day: 0.50     Years: 20.00     Total pack years: 10.00     Types: Cigarettes     Quit date:      Years since quittin.6   • Smokeless tobacco: Never   Vaping Use   • Vaping Use: Never used   Substance and Sexual Activity   • Alcohol use: Not Currently     Comment: quit    • Drug use: Never   • Sexual activity: Not Currently   Other Topics Concern   • None   Social History Narrative    · Most recent tobacco use screenin2018      · Do you currently or have you served in the [address] [place] [address] [employer]:   No      · Live alone or with others:   with others      · High blood pressure: Yes      · Exercise level:   Occasional      · Overweight:   Yes      · Obese: Yes      · Diabetes:   Yes      Social Determinants of Health     Financial Resource Strain: Not on file   Food Insecurity: No Food Insecurity (2023)    Hunger Vital Sign    • Worried About Running Out of Food in the Last Year: Never true    • Ran Out of Food in the Last Year: Never true   Transportation Needs: No Transportation Needs (2023)    PRAPARE - Transportation    • Lack of Transportation (Medical): No    • Lack of Transportation (Non-Medical):  No   Physical Activity: Not on file   Stress: Not on file   Social Connections: Not on file Intimate Partner Violence: Not on file   Housing Stability: Low Risk  (6/23/2023)    Housing Stability Vital Sign    • Unable to Pay for Housing in the Last Year: No    • Number of Places Lived in the Last Year: 1    • Unstable Housing in the Last Year: No      Review of Systems   Constitutional: Negative for chills and fever. Respiratory: Negative for shortness of breath. Cardiovascular: Negative for chest pain. Gastrointestinal: Negative for diarrhea, nausea and vomiting. Musculoskeletal: Negative for gait problem. Neurological: Positive for numbness. Objective:            /76   Pulse 91   Ht 5' 11" (1.803 m)   Wt 111 kg (245 lb)   BMI 34.17 kg/m²     Physical Exam  Vitals reviewed. Constitutional:       General: He is not in acute distress. Appearance: Normal appearance. He is not ill-appearing or toxic-appearing. Cardiovascular:      Rate and Rhythm: Normal rate and regular rhythm. Pulses: Normal pulses. Pulmonary:      Effort: Pulmonary effort is normal. No respiratory distress. Musculoskeletal:         General: Deformity present. No tenderness or signs of injury. Skin:     General: Skin is warm. Coloration: Skin is not cyanotic or mottled. Findings: No abscess, erythema or rash. Nails: There is no clubbing. Comments: Pre-ulcerative keratosis / dry blood on left submet 2. No active ulcer. No drainage. No signs of infection. Neurological:      General: No focal deficit present. Mental Status: He is alert and oriented to person, place, and time. Cranial Nerves: No cranial nerve deficit. Sensory: Sensory deficit present. Coordination: Coordination normal.   Psychiatric:         Mood and Affect: Mood normal.         Behavior: Behavior normal.         Thought Content:  Thought content normal.         Judgment: Judgment normal.

## 2023-08-16 ENCOUNTER — PREP FOR PROCEDURE (OUTPATIENT)
Dept: OBGYN CLINIC | Facility: CLINIC | Age: 68
End: 2023-08-16

## 2023-08-16 ENCOUNTER — OFFICE VISIT (OUTPATIENT)
Dept: OBGYN CLINIC | Facility: CLINIC | Age: 68
End: 2023-08-16

## 2023-08-16 ENCOUNTER — HOSPITAL ENCOUNTER (OUTPATIENT)
Dept: RADIOLOGY | Facility: HOSPITAL | Age: 68
Discharge: HOME | End: 2023-08-16
Attending: ORTHOPAEDIC SURGERY
Payer: MEDICARE

## 2023-08-16 ENCOUNTER — TELEPHONE (OUTPATIENT)
Dept: OBGYN CLINIC | Facility: CLINIC | Age: 68
End: 2023-08-16

## 2023-08-16 VITALS
HEIGHT: 71 IN | DIASTOLIC BLOOD PRESSURE: 80 MMHG | WEIGHT: 247.6 LBS | SYSTOLIC BLOOD PRESSURE: 167 MMHG | BODY MASS INDEX: 34.66 KG/M2 | HEART RATE: 96 BPM

## 2023-08-16 DIAGNOSIS — Z98.890 HISTORY OF REVERSE TOTAL REPLACEMENT OF LEFT SHOULDER JOINT: ICD-10-CM

## 2023-08-16 DIAGNOSIS — Z86.19 HISTORY OF REMOVAL OF JOINT PROSTHESIS OF LEFT SHOULDER DUE TO INFECTION: ICD-10-CM

## 2023-08-16 DIAGNOSIS — M00.9 PYOGENIC ARTHRITIS OF LEFT SHOULDER REGION, DUE TO UNSPECIFIED ORGANISM (HCC): ICD-10-CM

## 2023-08-16 DIAGNOSIS — Z47.1 AFTERCARE FOLLOWING LEFT SHOULDER JOINT REPLACEMENT SURGERY: Primary | ICD-10-CM

## 2023-08-16 DIAGNOSIS — Z98.890 HISTORY OF REMOVAL OF JOINT PROSTHESIS OF LEFT SHOULDER DUE TO INFECTION: ICD-10-CM

## 2023-08-16 DIAGNOSIS — T84.9XXA: ICD-10-CM

## 2023-08-16 DIAGNOSIS — Z96.612 AFTERCARE FOLLOWING LEFT SHOULDER JOINT REPLACEMENT SURGERY: ICD-10-CM

## 2023-08-16 DIAGNOSIS — Z47.1 AFTERCARE FOLLOWING LEFT SHOULDER JOINT REPLACEMENT SURGERY: ICD-10-CM

## 2023-08-16 DIAGNOSIS — M00.9 PYOGENIC ARTHRITIS OF LEFT SHOULDER REGION, DUE TO UNSPECIFIED ORGANISM (HCC): Primary | ICD-10-CM

## 2023-08-16 DIAGNOSIS — Z96.612 S/P REVERSE TOTAL SHOULDER ARTHROPLASTY, LEFT: ICD-10-CM

## 2023-08-16 DIAGNOSIS — Z96.612: ICD-10-CM

## 2023-08-16 DIAGNOSIS — Z96.612 AFTERCARE FOLLOWING LEFT SHOULDER JOINT REPLACEMENT SURGERY: Primary | ICD-10-CM

## 2023-08-16 PROCEDURE — 99024 POSTOP FOLLOW-UP VISIT: CPT | Performed by: ORTHOPAEDIC SURGERY

## 2023-08-16 PROCEDURE — 73030 X-RAY EXAM OF SHOULDER: CPT

## 2023-08-16 RX ORDER — INSULIN GLARGINE 100 [IU]/ML
INJECTION, SOLUTION SUBCUTANEOUS
COMMUNITY
Start: 2023-07-31

## 2023-08-16 NOTE — PROGRESS NOTES
[address]  [address]  [facility] [place] [address]  [phone]       Federica Harrison  [id number]  1955    ORTHOPAEDIC SURGERY OUTPATIENT NOTE  8/16/2023      HISTORY: 69 y.o. male presents approximately 2 months s/p left reverese shoulder arthroplasty explant, incision, and debirdement, and placement of antibiotic spacer, DOS 6/20/23. Patient was last seen in office 7/6/23 to have a spitting suture removed. He has had no further issues with that. He notes his shoulder is not good. He states it is very painful, however he isn't taking any medication for pain. He would like to discuss the replantation at this time. Past Medical History:   Diagnosis Date   • Arrhythmia    • CPAP (continuous positive airway pressure) dependence    • Diabetes mellitus ([address])    • History of echocardiogram 11/14/2017    showed EF of 50-55 percentWith moderate LVH and left ventricle diastolic dysfunction. Left atrium was moderately enlarged. Trace MR noted. • History of Holter monitoring 11/21/2017    showed baseline rhythm of sinus origin with an average heart it of 61 bpm. The lowest heart rate was 49 and the highest heart rate was 10 8 bpm. There were rare single VPCs, and frequent PACs representing 3.2% of total beats. There were several episodes of sinus arrhythmias with sinus bradycardia and heart rate ranging from 40-90 bpm. No sustained dysrhythmias, or pauses noted.  The patient did not   • Hypertension    • Liver disease    • Obese    • Sleep apnea        Past Surgical History:   Procedure Laterality Date   • CATARACT EXTRACTION     • EYE SURGERY Left 1997   • HERNIA REPAIR  [phone]   • KNEE ARTHROPLASTY Right 2008   • [place] ARTHROPLASTY GLENOHUMERAL JOINT TOTAL SHOULDER Left 4/4/2023    Procedure: ARTHROPLASTY SHOULDER REVERSE;  Surgeon: Vaughn Bourgeois MD;  [facility];  Service: Orthopedics   • MI AJRED SHOULDER [address] HUMERAL/GLENOID COMPNT Left 2023    Procedure: ARTHROPLASTY SHOULDER REVISION;  Surgeon: Omega Montes De Oca;  [facility];  Service: Orthopedics   • SHOULDER SURGERY Right    • WOUND DEBRIDEMENT Left 2023    Procedure: INCISION AND DRAINAGE (I&D) EXTREMITY, vac placement;  Surgeon: Preethi Coe MD;  [facility];  Service: Orthopedics       Social History     Socioeconomic History   • Marital status: /Civil Union     Spouse name: Not on file   • Number of children: 2   • Years of education: 16   • Highest education level: Some college, no degree   Occupational History   • Not on file   Tobacco Use   • Smoking status: Former     Packs/day: 0.50     Years: 20.00     Total pack years: 10.00     Types: Cigarettes     Quit date:      Years since quittin.6   • Smokeless tobacco: Never   Vaping Use   • Vaping Use: Never used   Substance and Sexual Activity   • Alcohol use: Not Currently     Comment: quit    • Drug use: Never   • Sexual activity: Not Currently   Other Topics Concern   • Not on file   Social History Narrative    · Most recent tobacco use screenin2018      · Do you currently or have you served in the [address] [place] [address] [employer]:   No      · Live alone or with others:   with others      · High blood pressure: Yes      · Exercise level:   Occasional      · Overweight:   Yes      · Obese: Yes      · Diabetes:   Yes      Social Determinants of Health     Financial Resource Strain: Not on file   Food Insecurity: No Food Insecurity (2023)    Hunger Vital Sign    • Worried About Running Out of Food in the Last Year: Never true    • Ran Out of Food in the Last Year: Never true   Transportation Needs: No Transportation Needs (2023)    PRAPARE - Transportation    • Lack of Transportation (Medical): No    • Lack of Transportation (Non-Medical):  No   Physical Activity: Not on file   Stress: Not on file   Social Connections: Not on file   Intimate Partner Violence: Not on file   Housing Stability: Low Risk  (6/23/2023)    Housing Stability Vital Sign    • Unable to Pay for Housing in the Last Year: No    • Number of Places Lived in the Last Year: 1    • Unstable Housing in the Last Year: No       Family History   Problem Relation Age of Onset   • Diabetes Mother    • Supraventricular tachycardia Mother    • COPD Father    • Heart disease Father    • Other Father         Sepsis; related to UTI with complicating cardiac problems   • Heart disease Paternal Grandmother         Patient's Medications   New Prescriptions    No medications on file   Previous Medications    FUROSEMIDE (LASIX) 20 MG TABLET    Take 1 tablet (20 mg total) by mouth every other day    GLIMEPIRIDE (AMARYL) 4 MG TABLET    Take 4 mg by mouth 2 (two) times a day    INSULIN GLARGINE (LANTUS) 100 UNITS/ML SUBCUTANEOUS INJECTION    Inject 20 Units under the skin daily at bedtime    INSULIN PEN NEEDLE (BD PEN NEEDLE TERRA 2ND GEN) 32G X 4 MM MISC    For use with insulin pen. Pharmacy may dispense brand covered by insurance. LANTUS SOLOSTAR 100 UNITS/ML SOPN    ADMINISTER 40 UNITS UNDER THE SKIN EVERY DAY    MULTIPLE VITAMIN (MULTIVITAMIN) CAPSULE    Take 1 capsule by mouth daily    SODIUM CHLORIDE FLUSH (NORMAL SALINE FLUSH IV)    Inject 10 mL into a catheter in a vein in the morning.  Flush pre antibiotic and post antibiotic and as needed for line patency  Indications: line patency    TAMSULOSIN (FLOMAX) 0.4 MG    Take 1 capsule (0.4 mg total) by mouth daily with dinner    TRAZODONE (DESYREL) 50 MG TABLET    Take 50 mg by mouth daily at bedtime bedtime   Modified Medications    No medications on file   Discontinued Medications    No medications on file       Allergies   Allergen Reactions   • Sulfa Antibiotics Hives   • Daptomycin Other (See Comments)     Possibly contributed to ABILIO on 6/2023 admission         /80 (BP Location: Right arm, Patient Position: Sitting, Cuff Size: Standard) Pulse 96   Ht 5' 11" (1.803 m)   Wt 112 kg (247 lb 9.6 oz)   BMI 34.53 kg/m²      REVIEW OF SYSTEMS:  Constitutional: Negative. HEENT: Negative. Respiratory: Negative. Skin: Negative. Neurological: Negative. Psychiatric/Behavioral: Negative. Musculoskeletal: Negative except for that mentioned in the HPI. PHYSICAL EXAM: Left Shoulder:      IMAGING:  No new imaging to study    ASSESSMENT AND PLAN:  69 y.o. male approximately 2 weeks s/p left shoulder reverse arthroplasty explant, incision debridement, and placement of antibiotic spacer. Today we attempted to do Synovasure aspiration which was unsuccessful. New xrays were ordered today. Orders placed for IR to do FL guided aspiration. Blood work ordered. The patient understands the risks and benefits of the procedure with risks including pain, stiffness, infection, neurovascular injury, recurrence of symptoms, failure of surgical procedure, inadvertent intraoperative complications, blood loss, blood clots, allergic reaction to anesthesia, stroke, heart attack, all up to and including to death. The patient understood and did consent for surgery today.  Patient will return for post op appointment

## 2023-08-16 NOTE — H&P (VIEW-ONLY)
[address]  [address]  [facility] [address]  [phone]       Chapis Yung  [id number]  1955    ORTHOPAEDIC SURGERY OUTPATIENT NOTE  8/16/2023      HISTORY: 69 y.o. male presents approximately 2 months s/p left reverese shoulder arthroplasty explant, incision, and debirdement, and placement of antibiotic spacer, DOS 6/20/23. Patient was last seen in office 7/6/23 to have a spitting suture removed. He has had no further issues with that. He notes his shoulder is not good. He states it is very painful, however he isn't taking any medication for pain. He would like to discuss the replantation at this time. Past Medical History:   Diagnosis Date   • Arrhythmia    • CPAP (continuous positive airway pressure) dependence    • Diabetes mellitus ([address])    • History of echocardiogram 11/14/2017    showed EF of 50-55 percentWith moderate LVH and left ventricle diastolic dysfunction. Left atrium was moderately enlarged. Trace MR noted. • History of Holter monitoring 11/21/2017    showed baseline rhythm of sinus origin with an average heart it of 61 bpm. The lowest heart rate was 49 and the highest heart rate was 10 8 bpm. There were rare single VPCs, and frequent PACs representing 3.2% of total beats. There were several episodes of sinus arrhythmias with sinus bradycardia and heart rate ranging from 40-90 bpm. No sustained dysrhythmias, or pauses noted.  The patient did not   • Hypertension    • Liver disease    • Obese    • Sleep apnea        Past Surgical History:   Procedure Laterality Date   • CATARACT EXTRACTION     • EYE SURGERY Left 1997   • HERNIA REPAIR  [phone]   • KNEE ARTHROPLASTY Right 2008   • [place] ARTHROPLASTY GLENOHUMERAL JOINT TOTAL SHOULDER Left 4/4/2023    Procedure: ARTHROPLASTY SHOULDER REVERSE;  Surgeon: Gita Montes MD;  [facility];  Service: Orthopedics   • [place] JARED SHOULDER [address] HUMERAL/GLENOID COMPNT Left 2023    Procedure: ARTHROPLASTY SHOULDER REVISION;  Surgeon: Lucas Gan;  [facility];  Service: Orthopedics   • SHOULDER SURGERY Right    • WOUND DEBRIDEMENT Left 2023    Procedure: INCISION AND DRAINAGE (I&D) EXTREMITY, vac placement;  Surgeon: Mahesh Weiner MD;  [facility];  Service: Orthopedics       Social History     Socioeconomic History   • Marital status: /Civil Union     Spouse name: Not on file   • Number of children: 2   • Years of education: 16   • Highest education level: Some college, no degree   Occupational History   • Not on file   Tobacco Use   • Smoking status: Former     Packs/day: 0.50     Years: 20.00     Total pack years: 10.00     Types: Cigarettes     Quit date:      Years since quittin.6   • Smokeless tobacco: Never   Vaping Use   • Vaping Use: Never used   Substance and Sexual Activity   • Alcohol use: Not Currently     Comment: quit    • Drug use: Never   • Sexual activity: Not Currently   Other Topics Concern   • Not on file   Social History Narrative    · Most recent tobacco use screenin2018      · Do you currently or have you served in the [address] [place] [address] [employer]:   No      · Live alone or with others:   with others      · High blood pressure: Yes      · Exercise level:   Occasional      · Overweight:   Yes      · Obese: Yes      · Diabetes:   Yes      Social Determinants of Health     Financial Resource Strain: Not on file   Food Insecurity: No Food Insecurity (2023)    Hunger Vital Sign    • Worried About Running Out of Food in the Last Year: Never true    • Ran Out of Food in the Last Year: Never true   Transportation Needs: No Transportation Needs (2023)    PRAPARE - Transportation    • Lack of Transportation (Medical): No    • Lack of Transportation (Non-Medical):  No   Physical Activity: Not on file   Stress: Not on file   Social Connections: Not on file   Intimate Partner Violence: Not on file   Housing Stability: Low Risk  (6/23/2023)    Housing Stability Vital Sign    • Unable to Pay for Housing in the Last Year: No    • Number of Places Lived in the Last Year: 1    • Unstable Housing in the Last Year: No       Family History   Problem Relation Age of Onset   • Diabetes Mother    • Supraventricular tachycardia Mother    • COPD Father    • Heart disease Father    • Other Father         Sepsis; related to UTI with complicating cardiac problems   • Heart disease Paternal Grandmother         Patient's Medications   New Prescriptions    No medications on file   Previous Medications    FUROSEMIDE (LASIX) 20 MG TABLET    Take 1 tablet (20 mg total) by mouth every other day    GLIMEPIRIDE (AMARYL) 4 MG TABLET    Take 4 mg by mouth 2 (two) times a day    INSULIN GLARGINE (LANTUS) 100 UNITS/ML SUBCUTANEOUS INJECTION    Inject 20 Units under the skin daily at bedtime    INSULIN PEN NEEDLE (BD PEN NEEDLE TERRA 2ND GEN) 32G X 4 MM MISC    For use with insulin pen. Pharmacy may dispense brand covered by insurance. LANTUS SOLOSTAR 100 UNITS/ML SOPN    ADMINISTER 40 UNITS UNDER THE SKIN EVERY DAY    MULTIPLE VITAMIN (MULTIVITAMIN) CAPSULE    Take 1 capsule by mouth daily    SODIUM CHLORIDE FLUSH (NORMAL SALINE FLUSH IV)    Inject 10 mL into a catheter in a vein in the morning.  Flush pre antibiotic and post antibiotic and as needed for line patency  Indications: line patency    TAMSULOSIN (FLOMAX) 0.4 MG    Take 1 capsule (0.4 mg total) by mouth daily with dinner    TRAZODONE (DESYREL) 50 MG TABLET    Take 50 mg by mouth daily at bedtime bedtime   Modified Medications    No medications on file   Discontinued Medications    No medications on file       Allergies   Allergen Reactions   • Sulfa Antibiotics Hives   • Daptomycin Other (See Comments)     Possibly contributed to ABILIO on 6/2023 admission         /80 (BP Location: Right arm, Patient Position: Sitting, Cuff Size: Standard) Pulse 96   Ht 5' 11" (1.803 m)   Wt 112 kg (247 lb 9.6 oz)   BMI 34.53 kg/m²      REVIEW OF SYSTEMS:  Constitutional: Negative. HEENT: Negative. Respiratory: Negative. Skin: Negative. Neurological: Negative. Psychiatric/Behavioral: Negative. Musculoskeletal: Negative except for that mentioned in the HPI. PHYSICAL EXAM: Left Shoulder:      IMAGING:  No new imaging to study    ASSESSMENT AND PLAN:  69 y.o. male approximately 2 weeks s/p left shoulder reverse arthroplasty explant, incision debridement, and placement of antibiotic spacer. Today we attempted to do Synovasure aspiration which was unsuccessful. New xrays were ordered today. Orders placed for IR to do FL guided aspiration. Blood work ordered. The patient understands the risks and benefits of the procedure with risks including pain, stiffness, infection, neurovascular injury, recurrence of symptoms, failure of surgical procedure, inadvertent intraoperative complications, blood loss, blood clots, allergic reaction to anesthesia, stroke, heart attack, all up to and including to death. The patient understood and did consent for surgery today.  Patient will return for post op appointment

## 2023-08-17 PROBLEM — M00.9 PYOGENIC ARTHRITIS OF LEFT SHOULDER REGION (HCC): Status: ACTIVE | Noted: 2023-08-17

## 2023-08-17 RX ORDER — CHLORHEXIDINE GLUCONATE 4 G/100ML
SOLUTION TOPICAL DAILY PRN
OUTPATIENT
Start: 2023-08-17

## 2023-08-17 RX ORDER — CHLORHEXIDINE GLUCONATE 0.12 MG/ML
15 RINSE ORAL ONCE
OUTPATIENT
Start: 2023-08-17 | End: 2023-08-17

## 2023-08-18 ENCOUNTER — APPOINTMENT (OUTPATIENT)
Dept: LAB | Facility: CLINIC | Age: 68
End: 2023-08-18
Payer: MEDICARE

## 2023-08-18 ENCOUNTER — TELEPHONE (OUTPATIENT)
Age: 68
End: 2023-08-18

## 2023-08-18 DIAGNOSIS — Z96.612 S/P REVERSE TOTAL SHOULDER ARTHROPLASTY, LEFT: ICD-10-CM

## 2023-08-18 DIAGNOSIS — Z86.19 HISTORY OF REMOVAL OF JOINT PROSTHESIS OF LEFT SHOULDER DUE TO INFECTION: ICD-10-CM

## 2023-08-18 DIAGNOSIS — Z98.890 HISTORY OF REMOVAL OF JOINT PROSTHESIS OF LEFT SHOULDER DUE TO INFECTION: ICD-10-CM

## 2023-08-18 DIAGNOSIS — E13.69 OTHER SPECIFIED DIABETES MELLITUS WITH OTHER SPECIFIED COMPLICATION, UNSPECIFIED WHETHER LONG TERM INSULIN USE (HCC): ICD-10-CM

## 2023-08-18 DIAGNOSIS — M00.9 PYOGENIC ARTHRITIS OF LEFT SHOULDER REGION, DUE TO UNSPECIFIED ORGANISM (HCC): ICD-10-CM

## 2023-08-18 LAB
ALBUMIN SERPL BCP-MCNC: 3 G/DL (ref 3.5–5)
ALP SERPL-CCNC: 239 U/L (ref 46–116)
ALT SERPL W P-5'-P-CCNC: 91 U/L (ref 12–78)
ANION GAP SERPL CALCULATED.3IONS-SCNC: 6 MMOL/L
APTT PPP: 35 SECONDS (ref 23–37)
AST SERPL W P-5'-P-CCNC: 86 U/L (ref 5–45)
BASOPHILS # BLD AUTO: 0.03 THOUSANDS/ÂΜL (ref 0–0.1)
BASOPHILS NFR BLD AUTO: 1 % (ref 0–1)
BILIRUB SERPL-MCNC: 1.19 MG/DL (ref 0.2–1)
BUN SERPL-MCNC: 18 MG/DL (ref 5–25)
CALCIUM ALBUM COR SERPL-MCNC: 10.4 MG/DL (ref 8.3–10.1)
CALCIUM SERPL-MCNC: 9.6 MG/DL (ref 8.3–10.1)
CHLORIDE SERPL-SCNC: 111 MMOL/L (ref 96–108)
CO2 SERPL-SCNC: 21 MMOL/L (ref 21–32)
CREAT SERPL-MCNC: 0.87 MG/DL (ref 0.6–1.3)
CRP SERPL QL: 15.9 MG/L
EOSINOPHIL # BLD AUTO: 0.41 THOUSAND/ÂΜL (ref 0–0.61)
EOSINOPHIL NFR BLD AUTO: 14 % (ref 0–6)
ERYTHROCYTE [DISTWIDTH] IN BLOOD BY AUTOMATED COUNT: 17.3 % (ref 11.6–15.1)
ERYTHROCYTE [SEDIMENTATION RATE] IN BLOOD: 68 MM/HOUR (ref 0–19)
EST. AVERAGE GLUCOSE BLD GHB EST-MCNC: 163 MG/DL
GFR SERPL CREATININE-BSD FRML MDRD: 88 ML/MIN/1.73SQ M
GLUCOSE SERPL-MCNC: 127 MG/DL (ref 65–140)
HBA1C MFR BLD: 7.3 %
HCT VFR BLD AUTO: 35.8 % (ref 36.5–49.3)
HGB BLD-MCNC: 11 G/DL (ref 12–17)
IMM GRANULOCYTES # BLD AUTO: 0.01 THOUSAND/UL (ref 0–0.2)
IMM GRANULOCYTES NFR BLD AUTO: 0 % (ref 0–2)
INR PPP: 1.11 (ref 0.84–1.19)
LYMPHOCYTES # BLD AUTO: 0.38 THOUSANDS/ÂΜL (ref 0.6–4.47)
LYMPHOCYTES NFR BLD AUTO: 13 % (ref 14–44)
MCH RBC QN AUTO: 25.8 PG (ref 26.8–34.3)
MCHC RBC AUTO-ENTMCNC: 30.7 G/DL (ref 31.4–37.4)
MCV RBC AUTO: 84 FL (ref 82–98)
MONOCYTES # BLD AUTO: 0.34 THOUSAND/ÂΜL (ref 0.17–1.22)
MONOCYTES NFR BLD AUTO: 12 % (ref 4–12)
NEUTROPHILS # BLD AUTO: 1.75 THOUSANDS/ÂΜL (ref 1.85–7.62)
NEUTS SEG NFR BLD AUTO: 60 % (ref 43–75)
NRBC BLD AUTO-RTO: 0 /100 WBCS
PLATELET # BLD AUTO: 69 THOUSANDS/UL (ref 149–390)
POTASSIUM SERPL-SCNC: 4.6 MMOL/L (ref 3.5–5.3)
PROT SERPL-MCNC: 7.5 G/DL (ref 6.4–8.4)
PROTHROMBIN TIME: 14.5 SECONDS (ref 11.6–14.5)
RBC # BLD AUTO: 4.26 MILLION/UL (ref 3.88–5.62)
SODIUM SERPL-SCNC: 138 MMOL/L (ref 135–147)
WBC # BLD AUTO: 2.92 THOUSAND/UL (ref 4.31–10.16)

## 2023-08-18 PROCEDURE — 85730 THROMBOPLASTIN TIME PARTIAL: CPT

## 2023-08-18 PROCEDURE — 80053 COMPREHEN METABOLIC PANEL: CPT

## 2023-08-18 PROCEDURE — 36415 COLL VENOUS BLD VENIPUNCTURE: CPT

## 2023-08-18 PROCEDURE — 86850 RBC ANTIBODY SCREEN: CPT | Performed by: ORTHOPAEDIC SURGERY

## 2023-08-18 PROCEDURE — 85652 RBC SED RATE AUTOMATED: CPT

## 2023-08-18 PROCEDURE — 86900 BLOOD TYPING SEROLOGIC ABO: CPT | Performed by: ORTHOPAEDIC SURGERY

## 2023-08-18 PROCEDURE — 85025 COMPLETE CBC W/AUTO DIFF WBC: CPT

## 2023-08-18 PROCEDURE — 83529 ASAY OF INTERLEUKIN-6 (IL-6): CPT

## 2023-08-18 PROCEDURE — 83036 HEMOGLOBIN GLYCOSYLATED A1C: CPT

## 2023-08-18 PROCEDURE — 85610 PROTHROMBIN TIME: CPT

## 2023-08-18 PROCEDURE — 86901 BLOOD TYPING SEROLOGIC RH(D): CPT | Performed by: ORTHOPAEDIC SURGERY

## 2023-08-18 PROCEDURE — 86140 C-REACTIVE PROTEIN: CPT

## 2023-08-18 NOTE — TELEPHONE ENCOUNTER
Caller: [email or web address]    Doctor: Elina Aragon    Reason for call: Kassandra Gomez wants to verify which lab work needs to be completed for patient     Call back#: [phone]

## 2023-08-19 ENCOUNTER — LAB REQUISITION (OUTPATIENT)
Dept: LAB | Facility: HOSPITAL | Age: 68
End: 2023-08-19
Payer: MEDICARE

## 2023-08-19 DIAGNOSIS — Z96.612 PRESENCE OF LEFT ARTIFICIAL SHOULDER JOINT: ICD-10-CM

## 2023-08-19 LAB
ABO GROUP BLD: NORMAL
BLD GP AB SCN SERPL QL: NEGATIVE
RH BLD: POSITIVE
SPECIMEN EXPIRATION DATE: NORMAL

## 2023-08-21 ENCOUNTER — TELEPHONE (OUTPATIENT)
Age: 68
End: 2023-08-21

## 2023-08-21 LAB — IL6 SERPL-MCNC: 14 PG/ML (ref 0–13)

## 2023-08-21 NOTE — TELEPHONE ENCOUNTER
Caller: RENATA Kenny    Doctor: Dennis Ramirez    Reason for call: therapy was calling asking if the pt will need another pre op appt for therapy or is the 5/25/23 fatmata-eval good    Call back#: [phone]

## 2023-08-26 ENCOUNTER — OFFICE VISIT (OUTPATIENT)
Dept: URGENT CARE | Facility: MEDICAL CENTER | Age: 68
End: 2023-08-26
Payer: MEDICARE

## 2023-08-26 VITALS
TEMPERATURE: 98.9 F | RESPIRATION RATE: 20 BRPM | WEIGHT: 251.38 LBS | OXYGEN SATURATION: 96 % | SYSTOLIC BLOOD PRESSURE: 136 MMHG | HEART RATE: 64 BPM | BODY MASS INDEX: 35.06 KG/M2 | DIASTOLIC BLOOD PRESSURE: 68 MMHG

## 2023-08-26 DIAGNOSIS — R05.1 ACUTE COUGH: Primary | ICD-10-CM

## 2023-08-26 LAB
SARS-COV-2 AG UPPER RESP QL IA: NEGATIVE
VALID CONTROL: NORMAL

## 2023-08-26 PROCEDURE — 99213 OFFICE O/P EST LOW 20 MIN: CPT | Performed by: PHYSICIAN ASSISTANT

## 2023-08-26 PROCEDURE — 87811 SARS-COV-2 COVID19 W/OPTIC: CPT | Performed by: PHYSICIAN ASSISTANT

## 2023-08-26 PROCEDURE — G0463 HOSPITAL OUTPT CLINIC VISIT: HCPCS | Performed by: PHYSICIAN ASSISTANT

## 2023-08-26 RX ORDER — BENZONATATE 200 MG/1
200 CAPSULE ORAL 3 TIMES DAILY PRN
Qty: 20 CAPSULE | Refills: 0 | Status: SHIPPED | OUTPATIENT
Start: 2023-08-26 | End: 2023-08-27

## 2023-08-26 NOTE — PATIENT INSTRUCTIONS
1.  Benzonatate as prescribed. 2.  Avoid cold fluids. 3.  Follow-up with your primary care doctor in 7 to 10 days for any persistent symptoms. 4.  Go to the ER immediately for any significantly worsening symptoms.

## 2023-08-26 NOTE — PROGRESS NOTES
[facility]        NAME: Kayla Pereira is a 69 y.o. male  : 1955    MRN: [id number]  DATE: 2023  TIME: 4:05 PM    Assessment and Plan   Acute cough [R05.1]  1. Acute cough  Poct Covid 19 Rapid Antigen Test    benzonatate (TESSALON) 200 MG capsule            Patient Instructions     1. Benzonatate as prescribed. 2.  Avoid cold fluids. 3.  Follow-up with your primary care doctor in 7 to 10 days for any persistent symptoms. 4.  Go to the ER immediately for any significantly worsening symptoms. Chief Complaint     Chief Complaint   Patient presents with   • Cough     1 week, fatigued. No fever. Runny nose, knees ache. Not taking anything for sx. History of Present Illness       68-year-old male patient with over a weeklong history of intermittent productive cough which is worse when he lays down and at night. Patient states he does feel sensation of postnasal drip and cold drinks seem to make the cough worse. Patient denies any shortness of breath, chest heaviness, chest pain. No fevers or chills. No current upper respiratory symptoms. Patient is concerned because he has surgery scheduled for 2023 and wants to know that he can go through with it. Cough  Pertinent negatives include no chest pain, chills, ear pain, fever, rash, sore throat or shortness of breath. Review of Systems   Review of Systems   Constitutional: Negative for chills and fever. HENT: Negative for ear pain and sore throat. Eyes: Negative for pain and visual disturbance. Respiratory: Positive for cough. Negative for shortness of breath. Cardiovascular: Negative for chest pain and palpitations. Gastrointestinal: Negative for abdominal pain and vomiting. Genitourinary: Negative for dysuria and hematuria. Musculoskeletal: Negative for arthralgias and back pain. Skin: Negative for color change and rash. Neurological: Negative for seizures and syncope.    All other systems reviewed and are negative. Current Medications       Current Outpatient Medications:   •  benzonatate (TESSALON) 200 MG capsule, Take 1 capsule (200 mg total) by mouth 3 (three) times a day as needed for cough, Disp: 20 capsule, Rfl: 0  •  furosemide (LASIX) 20 mg tablet, Take 1 tablet (20 mg total) by mouth every other day, Disp: 15 tablet, Rfl: 0  •  glimepiride (AMARYL) 4 mg tablet, Take 4 mg by mouth 2 (two) times a day, Disp: , Rfl:   •  insulin glargine (LANTUS) 100 units/mL subcutaneous injection, Inject 20 Units under the skin daily at bedtime, Disp: 10 mL, Rfl: 0  •  Insulin Pen Needle (BD Pen Needle Nayla 2nd Gen) 32G X 4 MM MISC, For use with insulin pen. Pharmacy may dispense brand covered by insurance., Disp: 100 each, Rfl: 0  •  Lantus SoloStar 100 units/mL SOPN, ADMINISTER 40 UNITS UNDER THE SKIN EVERY DAY, Disp: , Rfl:   •  Multiple Vitamin (multivitamin) capsule, Take 1 capsule by mouth daily, Disp: 21 capsule, Rfl: 0  •  Sodium Chloride Flush (NORMAL SALINE FLUSH IV), Inject 10 mL into a catheter in a vein in the morning.  Flush pre antibiotic and post antibiotic and as needed for line patency  Indications: line patency, Disp: , Rfl:   •  tamsulosin (FLOMAX) 0.4 mg, Take 1 capsule (0.4 mg total) by mouth daily with dinner, Disp: 30 capsule, Rfl: 0  •  traZODone (DESYREL) 50 mg tablet, Take 50 mg by mouth daily at bedtime bedtime, Disp: , Rfl:     Current Allergies     Allergies as of 08/26/2023 - Reviewed 08/26/2023   Allergen Reaction Noted   • Sulfa antibiotics Hives 11/05/2020   • Daptomycin Other (See Comments) 06/28/2023            The following portions of the patient's history were reviewed and updated as appropriate: allergies, current medications, past family history, past medical history, past social history, past surgical history and problem list.     Past Medical History:   Diagnosis Date   • Arrhythmia    • CPAP (continuous positive airway pressure) dependence    • Diabetes mellitus ([address])    • History of echocardiogram 11/14/2017    showed EF of 50-55 percentWith moderate LVH and left ventricle diastolic dysfunction. Left atrium was moderately enlarged. Trace MR noted. • History of Holter monitoring 11/21/2017    showed baseline rhythm of sinus origin with an average heart it of 61 bpm. The lowest heart rate was 49 and the highest heart rate was 10 8 bpm. There were rare single VPCs, and frequent PACs representing 3.2% of total beats. There were several episodes of sinus arrhythmias with sinus bradycardia and heart rate ranging from 40-90 bpm. No sustained dysrhythmias, or pauses noted. The patient did not   • Hypertension    • Liver disease    • Obese    • Sleep apnea        Past Surgical History:   Procedure Laterality Date   • CATARACT EXTRACTION     • EYE SURGERY Left 1997   • HERNIA REPAIR  [id number]   • KNEE ARTHROPLASTY Right 2008   • NM ARTHROPLASTY GLENOHUMERAL JOINT TOTAL SHOULDER Left 4/4/2023    Procedure: ARTHROPLASTY SHOULDER REVERSE;  Surgeon: Blanca Winter MD;  [facility];  Service: Orthopedics   • NM JARED SHOULDER ARTHRPLSTY HUMERAL/GLENOID COMPNT Left 6/13/2023    Procedure: ARTHROPLASTY SHOULDER REVISION;  Surgeon: Kate Enciso;  [facility];  Service: Orthopedics   • SHOULDER SURGERY Right 2002   • WOUND DEBRIDEMENT Left 5/2/2023    Procedure: INCISION AND DRAINAGE (I&D) EXTREMITY, vac placement;  Surgeon: Blanca Winter MD;  [facility];  Service: Orthopedics       Family History   Problem Relation Age of Onset   • Diabetes Mother    • Supraventricular tachycardia Mother    • COPD Father    • Heart disease Father    • Other Father         Sepsis; related to UTI with complicating cardiac problems   • Heart disease Paternal Grandmother          Medications have been verified.         Objective   /68   Pulse 64   Temp 98.9 °F (37.2 °C)   Resp 20   Wt 114 kg (251 lb 6 oz)   SpO2 96%   BMI 35.06 kg/m² Physical Exam     Physical Exam  Vitals and nursing note reviewed. Constitutional:       General: He is not in acute distress. Appearance: Normal appearance. He is not ill-appearing. HENT:      Head: Normocephalic. Right Ear: Tympanic membrane normal.      Left Ear: Tympanic membrane normal.      Nose: Nose normal.      Mouth/Throat:      Mouth: Mucous membranes are moist.      Pharynx: Oropharynx is clear. Posterior oropharyngeal erythema present. Eyes:      Conjunctiva/sclera: Conjunctivae normal.      Pupils: Pupils are equal, round, and reactive to light. Cardiovascular:      Rate and Rhythm: Normal rate and regular rhythm. Pulses: Normal pulses. Heart sounds: Normal heart sounds. Pulmonary:      Effort: Pulmonary effort is normal.      Breath sounds: Normal breath sounds. Abdominal:      Tenderness: There is no abdominal tenderness. Musculoskeletal:         General: Normal range of motion. Cervical back: Normal range of motion and neck supple. Skin:     General: Skin is warm and dry. Capillary Refill: Capillary refill takes less than 2 seconds. Neurological:      Mental Status: He is alert and oriented to person, place, and time.    Psychiatric:         Mood and Affect: Mood normal.         Behavior: Behavior normal.

## 2023-08-27 DIAGNOSIS — R05.1 ACUTE COUGH: ICD-10-CM

## 2023-08-27 RX ORDER — BENZONATATE 200 MG/1
200 CAPSULE ORAL 3 TIMES DAILY PRN
Qty: 20 CAPSULE | Refills: 0 | Status: SHIPPED | OUTPATIENT
Start: 2023-08-27

## 2023-08-28 ENCOUNTER — HOSPITAL ENCOUNTER (OUTPATIENT)
Dept: RADIOLOGY | Facility: HOSPITAL | Age: 68
Discharge: HOME | End: 2023-08-28
Attending: ORTHOPAEDIC SURGERY
Payer: MEDICARE

## 2023-08-28 ENCOUNTER — ANESTHESIA EVENT (OUTPATIENT)
Dept: PERIOP | Facility: HOSPITAL | Age: 68
DRG: 483 | End: 2023-08-28
Payer: MEDICARE

## 2023-08-28 ENCOUNTER — HOSPITAL ENCOUNTER (OUTPATIENT)
Dept: CT IMAGING | Facility: HOSPITAL | Age: 68
Discharge: HOME | End: 2023-08-28
Attending: ORTHOPAEDIC SURGERY
Payer: MEDICARE

## 2023-08-28 DIAGNOSIS — M00.9 PYOGENIC ARTHRITIS OF LEFT SHOULDER REGION, DUE TO UNSPECIFIED ORGANISM (HCC): ICD-10-CM

## 2023-08-28 PROCEDURE — 77002 NEEDLE LOCALIZATION BY XRAY: CPT

## 2023-08-28 PROCEDURE — 73200 CT UPPER EXTREMITY W/O DYE: CPT

## 2023-08-28 PROCEDURE — G1004 CDSM NDSC: HCPCS

## 2023-08-28 RX ORDER — LIDOCAINE HYDROCHLORIDE 10 MG/ML
7 INJECTION, SOLUTION EPIDURAL; INFILTRATION; INTRACAUDAL; PERINEURAL ONCE
Status: COMPLETED | OUTPATIENT
Start: 2023-08-28 | End: 2023-08-28

## 2023-08-28 RX ADMIN — LIDOCAINE HYDROCHLORIDE 7 ML: 10 INJECTION, SOLUTION EPIDURAL; INFILTRATION; INTRACAUDAL; PERINEURAL at 10:15

## 2023-08-28 RX ADMIN — IOHEXOL 5 ML: 300 INJECTION, SOLUTION INTRAVENOUS at 10:15

## 2023-08-28 NOTE — PRE-PROCEDURE INSTRUCTIONS
Pre-Surgery Instructions:   Medication Instructions   • benzonatate (TESSALON) 200 MG capsule Hold day of surgery. • furosemide (LASIX) 20 mg tablet Hold day of surgery. • glimepiride (AMARYL) 4 mg tablet Hold day of surgery. • Lantus SoloStar 100 units/mL SOPN Take night before surgery   • Multiple Vitamin (multivitamin) capsule Stop taking 7 days prior to surgery. • tamsulosin (FLOMAX) 0.4 mg Take night before surgery   • traZODone (DESYREL) 50 mg tablet Take night before surgery   Medication instructions for day surgery reviewed. Please use only a sip of water to take your instructed medications. Avoid all over the counter vitamins, supplements and NSAIDS for one week prior to surgery per anesthesia guidelines. Tylenol is ok to take as needed. You will receive a call one business day prior to surgery with an arrival time and hospital directions. If your surgery is scheduled on a Monday, the hospital will be calling you on the Friday prior to your surgery. If you have not heard from anyone by 8pm, please call the hospital supervisor through the hospital  at [phone]. Do not eat or drink anything after midnight the night before your surgery, including candy, mints, lifesavers, or chewing gum. Do not drink alcohol 24hrs before your surgery. Try not to smoke at least 24hrs before your surgery. Follow the pre surgery showering instructions as listed in the [facility] Surgical Experience Booklet” or otherwise provided by your surgeon's office. Do not shave the surgical area 24 hours before surgery. Do not apply any lotions, creams, including makeup, cologne, deodorant, or perfumes after showering on the day of your surgery. No contact lenses, eye make-up, or artificial eyelashes. Remove nail polish, including gel polish, and any artificial, gel, or acrylic nails if possible. Remove all jewelry including rings and body piercing jewelry.      Wear causal clothing that is easy to take on and off. Consider your type of surgery. Keep any valuables, jewelry, piercings at home. Please bring any specially ordered equipment (sling, braces) if indicated. Arrange for a responsible person to drive you to and from the hospital on the day of your surgery. Visitor Guidelines discussed. Call the surgeon's office with any new illnesses, exposures, or additional questions prior to surgery. Please reference your [facility] Surgical Experience Booklet” for additional information to prepare for your upcoming surgery.

## 2023-08-28 NOTE — TELEPHONE ENCOUNTER
Contacted PT office and spoke with Brooke Lepe. Updated her about pt. not needing pre-op appt. , will be seen at the facility 2-3 x per week following sx(9/8/23) and will receive new ref. for PT @ first post-op visit (9/14/23).

## 2023-08-29 ENCOUNTER — TELEPHONE (OUTPATIENT)
Age: 68
End: 2023-08-29

## 2023-08-29 NOTE — TELEPHONE ENCOUNTER
Caller: patient    Doctor: Galen Reeves    Reason for call: patient requesting a letter stating he is not allowed to travel at this time per providers request, so that he may get a refund from the airline for a trip he had planned previously    Call back#: [phone]

## 2023-09-01 ENCOUNTER — APPOINTMENT (OUTPATIENT)
Dept: LAB | Facility: CLINIC | Age: 68
End: 2023-09-01
Payer: MEDICARE

## 2023-09-01 DIAGNOSIS — K70.30 ALCOHOLIC CIRRHOSIS OF LIVER WITHOUT ASCITES (HCC): ICD-10-CM

## 2023-09-01 LAB
AFP-TM SERPL-MCNC: 29.68 NG/ML (ref 0–9)
ALBUMIN SERPL BCP-MCNC: 3.3 G/DL (ref 3.5–5)
ALP SERPL-CCNC: 222 U/L (ref 34–104)
ALT SERPL W P-5'-P-CCNC: 71 U/L (ref 7–52)
AST SERPL W P-5'-P-CCNC: 73 U/L (ref 13–39)
BASOPHILS # BLD AUTO: 0.03 THOUSANDS/ÂΜL (ref 0–0.1)
BASOPHILS NFR BLD AUTO: 1 % (ref 0–1)
BILIRUB DIRECT SERPL-MCNC: 0.4 MG/DL (ref 0–0.2)
BILIRUB SERPL-MCNC: 1.21 MG/DL (ref 0.2–1)
EOSINOPHIL # BLD AUTO: 0.55 THOUSAND/ÂΜL (ref 0–0.61)
EOSINOPHIL NFR BLD AUTO: 19 % (ref 0–6)
ERYTHROCYTE [DISTWIDTH] IN BLOOD BY AUTOMATED COUNT: 18.6 % (ref 11.6–15.1)
GGT SERPL-CCNC: 292 U/L (ref 9–64)
HCT VFR BLD AUTO: 36.2 % (ref 36.5–49.3)
HGB BLD-MCNC: 11.1 G/DL (ref 12–17)
IMM GRANULOCYTES # BLD AUTO: 0.01 THOUSAND/UL (ref 0–0.2)
IMM GRANULOCYTES NFR BLD AUTO: 0 % (ref 0–2)
LYMPHOCYTES # BLD AUTO: 0.39 THOUSANDS/ÂΜL (ref 0.6–4.47)
LYMPHOCYTES NFR BLD AUTO: 13 % (ref 14–44)
MCH RBC QN AUTO: 26.1 PG (ref 26.8–34.3)
MCHC RBC AUTO-ENTMCNC: 30.7 G/DL (ref 31.4–37.4)
MCV RBC AUTO: 85 FL (ref 82–98)
MONOCYTES # BLD AUTO: 0.33 THOUSAND/ÂΜL (ref 0.17–1.22)
MONOCYTES NFR BLD AUTO: 11 % (ref 4–12)
NEUTROPHILS # BLD AUTO: 1.65 THOUSANDS/ÂΜL (ref 1.85–7.62)
NEUTS SEG NFR BLD AUTO: 56 % (ref 43–75)
NRBC BLD AUTO-RTO: 0 /100 WBCS
PLATELET # BLD AUTO: 57 THOUSANDS/UL (ref 149–390)
PROT SERPL-MCNC: 7 G/DL (ref 6.4–8.4)
RBC # BLD AUTO: 4.26 MILLION/UL (ref 3.88–5.62)
WBC # BLD AUTO: 2.96 THOUSAND/UL (ref 4.31–10.16)

## 2023-09-01 PROCEDURE — 85025 COMPLETE CBC W/AUTO DIFF WBC: CPT

## 2023-09-01 PROCEDURE — 82105 ALPHA-FETOPROTEIN SERUM: CPT

## 2023-09-01 PROCEDURE — 82977 ASSAY OF GGT: CPT

## 2023-09-01 PROCEDURE — 36415 COLL VENOUS BLD VENIPUNCTURE: CPT

## 2023-09-01 PROCEDURE — 80076 HEPATIC FUNCTION PANEL: CPT

## 2023-09-07 ENCOUNTER — OFFICE VISIT (OUTPATIENT)
Dept: GASTROENTEROLOGY | Facility: CLINIC | Age: 68
End: 2023-09-07
Payer: MEDICARE

## 2023-09-07 ENCOUNTER — PREP FOR PROCEDURE (OUTPATIENT)
Dept: GASTROENTEROLOGY | Facility: CLINIC | Age: 68
End: 2023-09-07

## 2023-09-07 ENCOUNTER — TELEPHONE (OUTPATIENT)
Dept: GASTROENTEROLOGY | Facility: CLINIC | Age: 68
End: 2023-09-07

## 2023-09-07 VITALS
WEIGHT: 248 LBS | SYSTOLIC BLOOD PRESSURE: 144 MMHG | BODY MASS INDEX: 34.72 KG/M2 | HEIGHT: 71 IN | HEART RATE: 61 BPM | DIASTOLIC BLOOD PRESSURE: 65 MMHG

## 2023-09-07 DIAGNOSIS — Z87.19 HISTORY OF CIRRHOSIS: ICD-10-CM

## 2023-09-07 DIAGNOSIS — Z86.010 HISTORY OF COLON POLYPS: Primary | ICD-10-CM

## 2023-09-07 DIAGNOSIS — D69.6 THROMBOCYTOPENIA (HCC): ICD-10-CM

## 2023-09-07 DIAGNOSIS — R79.89 ELEVATED LFTS: ICD-10-CM

## 2023-09-07 DIAGNOSIS — R77.2 ELEVATED AFP: ICD-10-CM

## 2023-09-07 DIAGNOSIS — Z87.19 HISTORY OF ESOPHAGEAL VARICES: ICD-10-CM

## 2023-09-07 DIAGNOSIS — K70.30 ALCOHOLIC CIRRHOSIS OF LIVER WITHOUT ASCITES (HCC): Primary | ICD-10-CM

## 2023-09-07 PROCEDURE — 99214 OFFICE O/P EST MOD 30 MIN: CPT | Performed by: INTERNAL MEDICINE

## 2023-09-07 NOTE — TELEPHONE ENCOUNTER
Scheduled date of EGD/colonoscopy (as of today):11/22/23  Physician performing EGD/colonoscopy:Dr. Maryanne Galaviz  Location of EGD/colonoscopy:[facility]  Desired bowel prep reviewed with patient:Shanelle  Instructions reviewed with patient by:arleth  Clearances:  n/a

## 2023-09-07 NOTE — PROGRESS NOTES
[facility] - Outpatient Follow-up Note  Kaelyn Vences 69 y.o. male MRN: [id number]  Encounter: [id number]          ASSESSMENT AND PLAN:      1. Alcoholic cirrhosis of liver without ascites ([address])  -     MRI abdomen w wo contrast and mrcp; Future; Expected date: 09/07/2023    2. Elevated LFTs    3. Thrombocytopenia (HCC)    4. Elevated AFP  -     MRI abdomen w wo contrast and mrcp; Future; Expected date: 09/07/2023      Patient with history of alcoholic liver disease elevated LFTs elevated BMI thrombocytopenia has mild elevation of alpha-fetoprotein. The imaging done couple months ago did not show any mass in the liver was done without contrast.  We will order an MRI to ensure there is no liver lesion, cirrhosis itself can cause mild elevation of AFP as well. Patient well aware of fatal complications of advanced liver disease and cirrhosis. He stopped drinking alcohol for about a year. He is due for his EGD colonoscopy, will schedule in couple of months once he is recovered from his shoulder operation. AFP 29.68  Follow-up in my office in 6 months.  ______________________________________________________________________    SUBJECTIVE:      Patient came in for follow-up of his history of alcoholic liver disease, denies any new symptoms appetite is fair weight stable, denies any chest pain shortness of breath fever chills rash, has a left shoulder chronic pain planning for surgery tomorrow. Patient denies any dysphagia coughing choking spells blood in stools black tarry stools diet medications more than 10 systems reviewed, some of the current records noted. REVIEW OF SYSTEMS IS OTHERWISE NEGATIVE.       Historical Information   Past Medical History:   Diagnosis Date   • Arrhythmia    • Chronic kidney disease    • COVID 12/2020   • CPAP (continuous positive airway pressure) dependence    • Diabetes mellitus ([address])    • History of echocardiogram 11/14/2017    showed EF of 50-55 percentWith moderate LVH and left ventricle diastolic dysfunction. Left atrium was moderately enlarged. Trace MR noted. • History of Holter monitoring 2017    showed baseline rhythm of sinus origin with an average heart it of 61 bpm. The lowest heart rate was 49 and the highest heart rate was 10 8 bpm. There were rare single VPCs, and frequent PACs representing 3.2% of total beats. There were several episodes of sinus arrhythmias with sinus bradycardia and heart rate ranging from 40-90 bpm. No sustained dysrhythmias, or pauses noted.  The patient did not   • History of transfusion 2023    platelets   • Liver disease     cirhosis   • Obese    • Sleep apnea      Past Surgical History:   Procedure Laterality Date   • CATARACT EXTRACTION     • EYE SURGERY Left    • FL GUIDED NEEDLE PLAC BX/ASP/INJ  2023   • HERNIA REPAIR  [id number]   • JOINT REPLACEMENT Right     TKR   • KNEE ARTHROPLASTY Right    • OR ARTHROPLASTY GLENOHUMERAL JOINT TOTAL SHOULDER Left 2023    Procedure: ARTHROPLASTY SHOULDER REVERSE;  Surgeon: Srini Escobedo MD;  [facility];  Service: Orthopedics   • OR JARED SHOULDER ARTHRPLSTY HUMERAL/GLENOID COMPNT Left 2023    Procedure: ARTHROPLASTY SHOULDER REVISION;  Surgeon: Moira Foy;  [facility];  Service: Orthopedics   • SHOULDER SURGERY Right    • WOUND DEBRIDEMENT Left 2023    Procedure: INCISION AND DRAINAGE (I&D) EXTREMITY, vac placement;  Surgeon: Srini Escobedo MD;  [facility];  Service: Orthopedics     Social History   Social History     Substance and Sexual Activity   Alcohol Use Not Currently    Comment: quit      Social History     Substance and Sexual Activity   Drug Use Never     Social History     Tobacco Use   Smoking Status Former   • Packs/day: 0.50   • Years: 20.00   • Total pack years: 10.00   • Types: Cigarettes   • Quit date:    • Years since quittin.7   Smokeless Tobacco Never     Family History   Problem Relation Age of Onset   • Diabetes Mother    • Supraventricular tachycardia Mother    • COPD Father    • Heart disease Father    • Other Father         Sepsis; related to UTI with complicating cardiac problems   • Heart disease Paternal Grandmother        Meds/Allergies       Current Outpatient Medications:   •  benzonatate (TESSALON) 200 MG capsule  •  furosemide (LASIX) 20 mg tablet  •  glimepiride (AMARYL) 4 mg tablet  •  Insulin Pen Needle (BD Pen Needle Nayla 2nd Gen) 32G X 4 MM MISC  •  Lantus SoloStar 100 units/mL SOPN  •  Multiple Vitamin (multivitamin) capsule  •  tamsulosin (FLOMAX) 0.4 mg  •  traZODone (DESYREL) 50 mg tablet    Allergies   Allergen Reactions   • Sulfa Antibiotics Hives   • Daptomycin Other (See Comments)     Possibly contributed to ABILIO on 6/2023 admission            Objective     Blood pressure 144/65, pulse 61, height 5' 11" (1.803 m), weight 112 kg (248 lb). Body mass index is 34.59 kg/m². PHYSICAL EXAM:      General Appearance:   Alert, cooperative, no distress   HEENT:   Normocephalic, atraumatic, anicteric. Neck:  Supple, symmetrical, trachea midline   Lungs:   Clear to auscultation bilaterally; no rales, rhonchi or wheezing; respirations unlabored    Heart[de-identified]   Regular rate and rhythm; no murmur. Abdomen:   Soft, non-tender, non-distended; normal bowel sounds; no masses, no organomegaly    Genitalia:   Deferred    Rectal:   Deferred    Extremities:  No cyanosis, clubbing or edema    Skin:  No jaundice, rashes, or lesions    Lymph nodes:  No palpable cervical lymphadenopathy        Lab Results:   No visits with results within 1 Day(s) from this visit.    Latest known visit with results is:   Appointment on 09/01/2023   Component Date Value   • AFP TUMOR MARKER 09/01/2023 29.68 (H)    • WBC 09/01/2023 2.96 (L)    • RBC 09/01/2023 4.26    • Hemoglobin 09/01/2023 11.1 (L)    • Hematocrit 09/01/2023 36.2 (L)    • MCV 09/01/2023 85    • MCH 09/01/2023 26.1 (L)    • MCHC 09/01/2023 30.7 (L)    • RDW 09/01/2023 18.6 (H)    • Platelets 50/07/1117 57 (L)    • nRBC 09/01/2023 0    • Neutrophils Relative 09/01/2023 56    • Immat GRANS % 09/01/2023 0    • Lymphocytes Relative 09/01/2023 13 (L)    • Monocytes Relative 09/01/2023 11    • Eosinophils Relative 09/01/2023 19 (H)    • Basophils Relative 09/01/2023 1    • Neutrophils Absolute 09/01/2023 1.65 (L)    • Immature Grans Absolute 09/01/2023 0.01    • Lymphocytes Absolute 09/01/2023 0.39 (L)    • Monocytes Absolute 09/01/2023 0.33    • Eosinophils Absolute 09/01/2023 0.55    • Basophils Absolute 09/01/2023 0.03    • GGT 09/01/2023 292 (H)    • Total Bilirubin 09/01/2023 1.21 (H)    • Bilirubin, Direct 09/01/2023 0.40 (H)    • Alkaline Phosphatase 09/01/2023 222 (H)    • AST 09/01/2023 73 (H)    • ALT 09/01/2023 71 (H)    • Total Protein 09/01/2023 7.0    • Albumin 09/01/2023 3.3 (L)          Radiology Results:   CT shoulder left blue print    Result Date: 8/31/2023  Narrative: CT left shoulder without IV contrast (Preoperative shoulder replacement planning protocol.) INDICATION: M00.9: Pyogenic arthritis, unspecified. COMPARISON: Correlation is made with the prior radiograph dated 8/16/2023. TECHNIQUE: CT examination of the left shoulder was performed according to a protocol specified by shoulder replacement preoperative planning such as [employer] (also [employer], [employer], and [employer]) shoulder replacement to facilitate pre-op planning. This examination, like all CT scans performed in the [facility], was performed utilizing techniques to minimize radiation dose exposure, including the use of iterative reconstruction and automated exposure control software. Sagittal and coronal two dimensional reconstructed images were also submitted for interpretation.  Rad dose  649 mGy-cm FINDINGS: OSSEOUS STRUCTURES: There is an antibiotic methylmethacrylate spacer implantation along the proximal humerus. There are lucent tracks within the glenoid from prior reverse total shoulder arthroplasty. No acute fracture. No progressive osseous erosion. Mild to moderate acromioclavicular joint osteoarthrosis. VISUALIZED MUSCULATURE: There is mild fatty atrophy of the subscapularis and infraspinatus muscles. SOFT TISSUES:  Unremarkable. OTHER PERTINENT FINDINGS: No significant joint effusion. Impression: CT of the left shoulder performed according to preoperative shoulder replacement planning protocol. Status post explantation of a reverse total shoulder arthroplasty and placement of an antibiotic spacer along the proximal humerus. No progressive osseous erosion. Mild fatty atrophy of the subscapularis and infraspinatus muscles. Mild to moderate acromioclavicular joint osteoarthrosis. Workstation performed: [id number]     FL guided needle plac bx/asp/inj    Result Date: 8/28/2023  Narrative: FLUOROSCOPY GUIDED LEFT SHOULDER ASPIRATION WITH SYNOVASURE TESTING. INDICATION: F30.111: Presence of left artificial shoulder joint M25.512: Pain in left  shoulder. G89.29: Other chronic pain. Acute on chronic shoulder pain. Evaluate for occult infection. Patient has left antibiotic spacer in place. Revision total shoulder arthroplasty is scheduled for 9/8/2023 COMPARISON: Left shoulder radiograph dated 8/16/2023, CT left upper extremity without contrast dated 6/11/2023 IMAGES: 3 FLUOROSCOPY TIME: 0.4 minutes FINDINGS: After the risks and benefits of the procedure were thoroughly explained, informed consent was obtained. The patient verbalized expressed understanding of the above risks and wished to proceed with the procedure. Final standard "time-out" procedure performed. The patient was prepped and draped in the usual sterile fashion. 1% lidocaine solution was utilized for local anesthesia.  Intermittent fluoroscopy was utilized for placement a 18 gauge 3.5 inch spinal needle within the left shoulder joint. Initial aspiration yielded approximately 2 mL of clear lakeshia serous fluid. Positioning was confirmed with an injection of 0.5 mL of Omnipaque 300. Fluid was sent for [facility] testing as requested. The patient tolerated the procedure well. There were no complications. I asked the patient to call us with any questions, concerns, or acute problems. The patient expressed understanding of the above. Impression: Technically successful left shoulder aspiration yielding approximately 2 mL of clear lakes[place]ia serous fluid. The fluid was sent for [facility] testing as requested. I reviewed the above findings and procedure with Dr. Yaneth Kaur Procedure was performed by Janina DOTSON under the direct supervision of Dr. Lamar Loja Workstation performed: [id number]     XR shoulder 2+ vw left    Result Date: 8/26/2023  Narrative: LEFT SHOULDER INDICATION:   Z47.1: Aftercare following joint replacement surgery Z96.612: Presence of left artificial shoulder joint M00.9: Pyogenic arthritis, unspecified Z98.890: Other specified postprocedural states Z86.19: Personal history of other infectious and parasitic diseases. COMPARISON: July 6, 2023 VIEWS:  XR SHOULDER 2+ VW LEFT Images: 3 FINDINGS: Antibiotic spacer seen within the left humerus No significant degenerative changes. No lytic or blastic osseous lesion. Soft tissues are unremarkable. Impression: Antibiotic spacer seen within the proximal left humerus with stable alignment.  Workstation performed: [id number]

## 2023-09-07 NOTE — TELEPHONE ENCOUNTER
ASC Screening    ASC Screening  BMI > than 45: No  Are you currently pregnant?: No  Do you rely on a wheelchair for mobility?: No  Do you need oxygen during the day?: No  Have you ever been informed by anesthesia that you have a difficult airway?: No  Have you been diagnosed with End Stage Renal Disease (ESRD)?: No  Are you actively on dialysis?: No  Have you been diagnosed with Pulmonary Hypertension?: No  Do you have a pacemaker or an Automatic Implantable Cardioverter Defibrillator (AICD)?: No  Have you ever had an organ transplant?: No  Have you had a stroke, heart attack, myocardial infarction (MI) within the last 6 months?: No  Have you ever been diagnosed with Aortic Stenosis?: No  Have you ever been diagnosed  with Congestive Heart Failure?: No  Have you ever been diagnosed with a heart valve disease?: Yes  Are you Diabetic?: Yes  If you are Diabetic, has your A1C been greater than 12 within the last six months?: No

## 2023-09-08 ENCOUNTER — HOSPITAL ENCOUNTER (INPATIENT)
Facility: HOSPITAL | Age: 68
LOS: 1 days | Discharge: HOME | DRG: 483 | End: 2023-09-09
Attending: ORTHOPAEDIC SURGERY | Admitting: ORTHOPAEDIC SURGERY
Payer: MEDICARE

## 2023-09-08 ENCOUNTER — APPOINTMENT (OUTPATIENT)
Dept: RADIOLOGY | Facility: HOSPITAL | Age: 68
DRG: 483 | End: 2023-09-08
Payer: MEDICARE

## 2023-09-08 ENCOUNTER — ANESTHESIA (OUTPATIENT)
Dept: PERIOP | Facility: HOSPITAL | Age: 68
DRG: 483 | End: 2023-09-08
Payer: MEDICARE

## 2023-09-08 ENCOUNTER — APPOINTMENT (INPATIENT)
Dept: RADIOLOGY | Facility: HOSPITAL | Age: 68
DRG: 483 | End: 2023-09-08
Payer: MEDICARE

## 2023-09-08 DIAGNOSIS — N17.9 ACUTE KIDNEY INJURY SUPERIMPOSED ON CHRONIC KIDNEY DISEASE: ICD-10-CM

## 2023-09-08 DIAGNOSIS — K70.30 ALCOHOLIC CIRRHOSIS OF LIVER WITHOUT ASCITES (HCC): ICD-10-CM

## 2023-09-08 DIAGNOSIS — M00.9 PYOGENIC ARTHRITIS OF LEFT SHOULDER REGION, DUE TO UNSPECIFIED ORGANISM (HCC): Primary | ICD-10-CM

## 2023-09-08 DIAGNOSIS — N18.9 ACUTE KIDNEY INJURY SUPERIMPOSED ON CHRONIC KIDNEY DISEASE: ICD-10-CM

## 2023-09-08 DIAGNOSIS — Z96.612 S/P REVERSE TOTAL SHOULDER ARTHROPLASTY, LEFT: ICD-10-CM

## 2023-09-08 PROBLEM — N18.31 STAGE 3A CHRONIC KIDNEY DISEASE (HCC): Status: ACTIVE | Noted: 2023-06-15

## 2023-09-08 LAB
GLUCOSE SERPL-MCNC: 117 MG/DL (ref 65–140)
GLUCOSE SERPL-MCNC: 310 MG/DL (ref 65–140)
GLUCOSE SERPL-MCNC: 374 MG/DL (ref 65–140)
GLUCOSE SERPL-MCNC: 420 MG/DL (ref 65–140)

## 2023-09-08 PROCEDURE — C1776 JOINT DEVICE (IMPLANTABLE): HCPCS | Performed by: ORTHOPAEDIC SURGERY

## 2023-09-08 PROCEDURE — 87070 CULTURE OTHR SPECIMN AEROBIC: CPT | Performed by: ORTHOPAEDIC SURGERY

## 2023-09-08 PROCEDURE — 73030 X-RAY EXAM OF SHOULDER: CPT

## 2023-09-08 PROCEDURE — 23472 RECONSTRUCT SHOULDER JOINT: CPT | Performed by: PHYSICIAN ASSISTANT

## 2023-09-08 PROCEDURE — 23472 RECONSTRUCT SHOULDER JOINT: CPT | Performed by: ORTHOPAEDIC SURGERY

## 2023-09-08 PROCEDURE — 0RPK08Z REMOVAL OF SPACER FROM LEFT SHOULDER JOINT, OPEN APPROACH: ICD-10-PCS | Performed by: ORTHOPAEDIC SURGERY

## 2023-09-08 PROCEDURE — 99223 1ST HOSP IP/OBS HIGH 75: CPT | Performed by: STUDENT IN AN ORGANIZED HEALTH CARE EDUCATION/TRAINING PROGRAM

## 2023-09-08 PROCEDURE — 97167 OT EVAL HIGH COMPLEX 60 MIN: CPT

## 2023-09-08 PROCEDURE — C1713 ANCHOR/SCREW BN/BN,TIS/BN: HCPCS | Performed by: ORTHOPAEDIC SURGERY

## 2023-09-08 PROCEDURE — 11982 REMOVE DRUG IMPLANT DEVICE: CPT | Performed by: ORTHOPAEDIC SURGERY

## 2023-09-08 PROCEDURE — 87176 TISSUE HOMOGENIZATION CULTR: CPT | Performed by: ORTHOPAEDIC SURGERY

## 2023-09-08 PROCEDURE — C9290 INJ, BUPIVACAINE LIPOSOME: HCPCS | Performed by: STUDENT IN AN ORGANIZED HEALTH CARE EDUCATION/TRAINING PROGRAM

## 2023-09-08 PROCEDURE — 0RRK00Z REPLACEMENT OF LEFT SHOULDER JOINT WITH REVERSE BALL AND SOCKET SYNTHETIC SUBSTITUTE, OPEN APPROACH: ICD-10-PCS | Performed by: ORTHOPAEDIC SURGERY

## 2023-09-08 PROCEDURE — 11982 REMOVE DRUG IMPLANT DEVICE: CPT | Performed by: PHYSICIAN ASSISTANT

## 2023-09-08 PROCEDURE — 82948 REAGENT STRIP/BLOOD GLUCOSE: CPT

## 2023-09-08 PROCEDURE — 87205 SMEAR GRAM STAIN: CPT | Performed by: ORTHOPAEDIC SURGERY

## 2023-09-08 PROCEDURE — 73020 X-RAY EXAM OF SHOULDER: CPT

## 2023-09-08 DEVICE — SCREW CENTRAL 6 X 40MM: Type: IMPLANTABLE DEVICE | Site: SHOULDER | Status: FUNCTIONAL

## 2023-09-08 DEVICE — IMPLANTABLE DEVICE
Type: IMPLANTABLE DEVICE | Site: SHOULDER | Status: FUNCTIONAL
Brand: TORNIER FLEX SHOULDER SYSTEM

## 2023-09-08 DEVICE — SCREW PERIPHERAL 5 X 30MM: Type: IMPLANTABLE DEVICE | Site: SHOULDER | Status: FUNCTIONAL

## 2023-09-08 DEVICE — IMPLANTABLE DEVICE
Type: IMPLANTABLE DEVICE | Site: SHOULDER | Status: FUNCTIONAL
Brand: FLEX SHOULDER SYSTEM

## 2023-09-08 DEVICE — SCREW PERIPHERAL 5 X 22MM: Type: IMPLANTABLE DEVICE | Site: SHOULDER | Status: FUNCTIONAL

## 2023-09-08 DEVICE — GLENOSPHERE STD 36MM: Type: IMPLANTABLE DEVICE | Site: SHOULDER | Status: FUNCTIONAL

## 2023-09-08 RX ORDER — FENTANYL CITRATE 50 UG/ML
INJECTION, SOLUTION INTRAMUSCULAR; INTRAVENOUS AS NEEDED
Status: DISCONTINUED | OUTPATIENT
Start: 2023-09-08 | End: 2023-09-08

## 2023-09-08 RX ORDER — OXYCODONE HYDROCHLORIDE 5 MG/1
5 TABLET ORAL EVERY 4 HOURS PRN
Status: DISCONTINUED | OUTPATIENT
Start: 2023-09-08 | End: 2023-09-09 | Stop reason: HOSPADM

## 2023-09-08 RX ORDER — FENTANYL CITRATE/PF 50 MCG/ML
25 SYRINGE (ML) INJECTION
Status: DISCONTINUED | OUTPATIENT
Start: 2023-09-08 | End: 2023-09-08 | Stop reason: HOSPADM

## 2023-09-08 RX ORDER — OXYCODONE HYDROCHLORIDE 10 MG/1
10 TABLET ORAL EVERY 4 HOURS PRN
Status: DISCONTINUED | OUTPATIENT
Start: 2023-09-08 | End: 2023-09-09 | Stop reason: HOSPADM

## 2023-09-08 RX ORDER — MIDAZOLAM HYDROCHLORIDE 2 MG/2ML
INJECTION, SOLUTION INTRAMUSCULAR; INTRAVENOUS AS NEEDED
Status: DISCONTINUED | OUTPATIENT
Start: 2023-09-08 | End: 2023-09-08

## 2023-09-08 RX ORDER — SODIUM CHLORIDE, SODIUM LACTATE, POTASSIUM CHLORIDE, CALCIUM CHLORIDE 600; 310; 30; 20 MG/100ML; MG/100ML; MG/100ML; MG/100ML
INJECTION, SOLUTION INTRAVENOUS CONTINUOUS PRN
Status: DISCONTINUED | OUTPATIENT
Start: 2023-09-08 | End: 2023-09-08

## 2023-09-08 RX ORDER — MAGNESIUM HYDROXIDE 1200 MG/15ML
LIQUID ORAL AS NEEDED
Status: DISCONTINUED | OUTPATIENT
Start: 2023-09-08 | End: 2023-09-08 | Stop reason: HOSPADM

## 2023-09-08 RX ORDER — CEFAZOLIN SODIUM 2 G/50ML
2000 SOLUTION INTRAVENOUS ONCE
Status: COMPLETED | OUTPATIENT
Start: 2023-09-08 | End: 2023-09-08

## 2023-09-08 RX ORDER — OXYCODONE HYDROCHLORIDE 5 MG/1
5 TABLET ORAL EVERY 6 HOURS PRN
Qty: 30 TABLET | Refills: 0 | Status: SHIPPED | OUTPATIENT
Start: 2023-09-08 | End: 2023-09-18

## 2023-09-08 RX ORDER — ROCURONIUM BROMIDE 10 MG/ML
INJECTION, SOLUTION INTRAVENOUS AS NEEDED
Status: DISCONTINUED | OUTPATIENT
Start: 2023-09-08 | End: 2023-09-08

## 2023-09-08 RX ORDER — PENICILLIN V POTASSIUM 500 MG/1
500 TABLET ORAL EVERY 6 HOURS SCHEDULED
Qty: 28 TABLET | Refills: 0 | Status: SHIPPED | OUTPATIENT
Start: 2023-09-08 | End: 2023-09-15

## 2023-09-08 RX ORDER — INSULIN GLARGINE 100 [IU]/ML
10 INJECTION, SOLUTION SUBCUTANEOUS
Status: DISCONTINUED | OUTPATIENT
Start: 2023-09-08 | End: 2023-09-09 | Stop reason: HOSPADM

## 2023-09-08 RX ORDER — ONDANSETRON 2 MG/ML
4 INJECTION INTRAMUSCULAR; INTRAVENOUS EVERY 6 HOURS PRN
Status: DISCONTINUED | OUTPATIENT
Start: 2023-09-08 | End: 2023-09-09 | Stop reason: HOSPADM

## 2023-09-08 RX ORDER — DEXAMETHASONE SODIUM PHOSPHATE 10 MG/ML
INJECTION, SOLUTION INTRAMUSCULAR; INTRAVENOUS AS NEEDED
Status: DISCONTINUED | OUTPATIENT
Start: 2023-09-08 | End: 2023-09-08

## 2023-09-08 RX ORDER — TRAZODONE HYDROCHLORIDE 50 MG/1
50 TABLET ORAL
Status: DISCONTINUED | OUTPATIENT
Start: 2023-09-08 | End: 2023-09-09 | Stop reason: HOSPADM

## 2023-09-08 RX ORDER — BUPIVACAINE HYDROCHLORIDE 5 MG/ML
INJECTION, SOLUTION EPIDURAL; INTRACAUDAL
Status: COMPLETED | OUTPATIENT
Start: 2023-09-08 | End: 2023-09-08

## 2023-09-08 RX ORDER — CEFAZOLIN SODIUM 2 G/50ML
2000 SOLUTION INTRAVENOUS EVERY 8 HOURS
Status: COMPLETED | OUTPATIENT
Start: 2023-09-08 | End: 2023-09-09

## 2023-09-08 RX ORDER — INSULIN LISPRO 100 [IU]/ML
1-6 INJECTION, SOLUTION INTRAVENOUS; SUBCUTANEOUS
Status: DISCONTINUED | OUTPATIENT
Start: 2023-09-08 | End: 2023-09-09 | Stop reason: HOSPADM

## 2023-09-08 RX ORDER — CHLORHEXIDINE GLUCONATE 4 G/100ML
SOLUTION TOPICAL DAILY PRN
Status: DISCONTINUED | OUTPATIENT
Start: 2023-09-08 | End: 2023-09-09 | Stop reason: HOSPADM

## 2023-09-08 RX ORDER — PROPOFOL 10 MG/ML
INJECTION, EMULSION INTRAVENOUS AS NEEDED
Status: DISCONTINUED | OUTPATIENT
Start: 2023-09-08 | End: 2023-09-08

## 2023-09-08 RX ORDER — HYDROMORPHONE HCL/PF 1 MG/ML
0.5 SYRINGE (ML) INJECTION EVERY 2 HOUR PRN
Status: DISCONTINUED | OUTPATIENT
Start: 2023-09-08 | End: 2023-09-09 | Stop reason: HOSPADM

## 2023-09-08 RX ORDER — HYDROMORPHONE HCL IN WATER/PF 6 MG/30 ML
0.2 PATIENT CONTROLLED ANALGESIA SYRINGE INTRAVENOUS
Status: DISCONTINUED | OUTPATIENT
Start: 2023-09-08 | End: 2023-09-08 | Stop reason: HOSPADM

## 2023-09-08 RX ORDER — LIDOCAINE HYDROCHLORIDE 10 MG/ML
INJECTION, SOLUTION EPIDURAL; INFILTRATION; INTRACAUDAL; PERINEURAL AS NEEDED
Status: DISCONTINUED | OUTPATIENT
Start: 2023-09-08 | End: 2023-09-08

## 2023-09-08 RX ORDER — TAMSULOSIN HYDROCHLORIDE 0.4 MG/1
0.4 CAPSULE ORAL
Status: DISCONTINUED | OUTPATIENT
Start: 2023-09-08 | End: 2023-09-09 | Stop reason: HOSPADM

## 2023-09-08 RX ORDER — VANCOMYCIN HYDROCHLORIDE 1 G/20ML
INJECTION, POWDER, LYOPHILIZED, FOR SOLUTION INTRAVENOUS AS NEEDED
Status: DISCONTINUED | OUTPATIENT
Start: 2023-09-08 | End: 2023-09-08 | Stop reason: HOSPADM

## 2023-09-08 RX ORDER — FUROSEMIDE 20 MG/1
20 TABLET ORAL EVERY MORNING
Status: DISCONTINUED | OUTPATIENT
Start: 2023-09-09 | End: 2023-09-09 | Stop reason: HOSPADM

## 2023-09-08 RX ORDER — ONDANSETRON 2 MG/ML
INJECTION INTRAMUSCULAR; INTRAVENOUS AS NEEDED
Status: DISCONTINUED | OUTPATIENT
Start: 2023-09-08 | End: 2023-09-08

## 2023-09-08 RX ORDER — CHLORHEXIDINE GLUCONATE ORAL RINSE 1.2 MG/ML
15 SOLUTION DENTAL ONCE
Status: COMPLETED | OUTPATIENT
Start: 2023-09-08 | End: 2023-09-08

## 2023-09-08 RX ORDER — TRANEXAMIC ACID 10 MG/ML
1000 INJECTION, SOLUTION INTRAVENOUS ONCE
Status: COMPLETED | OUTPATIENT
Start: 2023-09-08 | End: 2023-09-08

## 2023-09-08 RX ADMIN — INSULIN LISPRO 6 UNITS: 100 INJECTION, SOLUTION INTRAVENOUS; SUBCUTANEOUS at 20:34

## 2023-09-08 RX ADMIN — BUPIVACAINE 20 ML: 13.3 INJECTION, SUSPENSION, LIPOSOMAL INFILTRATION at 07:35

## 2023-09-08 RX ADMIN — ROCURONIUM BROMIDE 10 MG: 10 INJECTION, SOLUTION INTRAVENOUS at 08:04

## 2023-09-08 RX ADMIN — MIDAZOLAM 1 MG: 1 INJECTION INTRAMUSCULAR; INTRAVENOUS at 07:34

## 2023-09-08 RX ADMIN — DEXAMETHASONE SODIUM PHOSPHATE 10 MG: 10 INJECTION, SOLUTION INTRAMUSCULAR; INTRAVENOUS at 07:42

## 2023-09-08 RX ADMIN — FENTANYL CITRATE 50 MCG: 50 INJECTION, SOLUTION INTRAMUSCULAR; INTRAVENOUS at 07:42

## 2023-09-08 RX ADMIN — MIDAZOLAM 1 MG: 1 INJECTION INTRAMUSCULAR; INTRAVENOUS at 07:42

## 2023-09-08 RX ADMIN — ROCURONIUM BROMIDE 10 MG: 10 INJECTION, SOLUTION INTRAVENOUS at 08:25

## 2023-09-08 RX ADMIN — OXYCODONE HYDROCHLORIDE 5 MG: 5 TABLET ORAL at 22:20

## 2023-09-08 RX ADMIN — TRANEXAMIC ACID 1000 MG: 10 INJECTION, SOLUTION INTRAVENOUS at 09:56

## 2023-09-08 RX ADMIN — TRANEXAMIC ACID 1000 MG: 10 INJECTION, SOLUTION INTRAVENOUS at 07:53

## 2023-09-08 RX ADMIN — ROCURONIUM BROMIDE 10 MG: 10 INJECTION, SOLUTION INTRAVENOUS at 09:42

## 2023-09-08 RX ADMIN — BUPIVACAINE HYDROCHLORIDE 10 ML: 5 INJECTION, SOLUTION EPIDURAL; INTRACAUDAL; PERINEURAL at 07:35

## 2023-09-08 RX ADMIN — CEFAZOLIN SODIUM 2000 MG: 2 SOLUTION INTRAVENOUS at 14:23

## 2023-09-08 RX ADMIN — FENTANYL CITRATE 50 MCG: 50 INJECTION, SOLUTION INTRAMUSCULAR; INTRAVENOUS at 07:34

## 2023-09-08 RX ADMIN — TAMSULOSIN HYDROCHLORIDE 0.4 MG: 0.4 CAPSULE ORAL at 17:33

## 2023-09-08 RX ADMIN — INSULIN LISPRO 5 UNITS: 100 INJECTION, SOLUTION INTRAVENOUS; SUBCUTANEOUS at 17:33

## 2023-09-08 RX ADMIN — CEFAZOLIN SODIUM 2000 MG: 2 SOLUTION INTRAVENOUS at 20:37

## 2023-09-08 RX ADMIN — ONDANSETRON 4 MG: 2 INJECTION INTRAMUSCULAR; INTRAVENOUS at 10:00

## 2023-09-08 RX ADMIN — ROCURONIUM BROMIDE 10 MG: 10 INJECTION, SOLUTION INTRAVENOUS at 09:19

## 2023-09-08 RX ADMIN — PHENYLEPHRINE HYDROCHLORIDE 40 MCG/MIN: 10 INJECTION INTRAVENOUS at 08:15

## 2023-09-08 RX ADMIN — SODIUM CHLORIDE, SODIUM LACTATE, POTASSIUM CHLORIDE, AND CALCIUM CHLORIDE: .6; .31; .03; .02 INJECTION, SOLUTION INTRAVENOUS at 09:42

## 2023-09-08 RX ADMIN — INSULIN GLARGINE 10 UNITS: 100 INJECTION, SOLUTION SUBCUTANEOUS at 22:21

## 2023-09-08 RX ADMIN — TRAZODONE HYDROCHLORIDE 50 MG: 50 TABLET ORAL at 22:21

## 2023-09-08 RX ADMIN — ROCURONIUM BROMIDE 10 MG: 10 INJECTION, SOLUTION INTRAVENOUS at 08:54

## 2023-09-08 RX ADMIN — ROCURONIUM BROMIDE 50 MG: 10 INJECTION, SOLUTION INTRAVENOUS at 07:42

## 2023-09-08 RX ADMIN — PROPOFOL 200 MG: 10 INJECTION, EMULSION INTRAVENOUS at 07:42

## 2023-09-08 RX ADMIN — SODIUM CHLORIDE, SODIUM LACTATE, POTASSIUM CHLORIDE, AND CALCIUM CHLORIDE: .6; .31; .03; .02 INJECTION, SOLUTION INTRAVENOUS at 07:35

## 2023-09-08 RX ADMIN — SUGAMMADEX 200 MG: 100 INJECTION, SOLUTION INTRAVENOUS at 10:35

## 2023-09-08 RX ADMIN — CHLORHEXIDINE GLUCONATE 0.12% ORAL RINSE 15 ML: 1.2 LIQUID ORAL at 07:09

## 2023-09-08 RX ADMIN — LIDOCAINE HYDROCHLORIDE 50 MG: 10 INJECTION, SOLUTION EPIDURAL; INFILTRATION; INTRACAUDAL; PERINEURAL at 07:42

## 2023-09-08 RX ADMIN — CEFAZOLIN SODIUM 2000 MG: 2 SOLUTION INTRAVENOUS at 07:48

## 2023-09-08 NOTE — CASE MANAGEMENT
Case Management Assessment    Patient name Veronica Bernal  Location /-20 MRN [id number]  : 1955 Date 2023       Current Admission Date: 2023  Current Admission Diagnosis:Pyogenic arthritis of left shoulder region [facility])   Patient Active Problem List    Diagnosis Date Noted   • Pyogenic arthritis of left shoulder region [facility]) 2023   • Urinary retention 2023   • Acute kidney injury superimposed on chronic kidney disease stage 3 06/15/2023   • Murmur 06/15/2023   • Postoperative infection of surgical wound 2023   • Ulcer of left foot, limited to breakdown of skin ([address]) 2023   • Aftercare following left shoulder joint replacement surgery 2023   • S/P reverse total shoulder arthroplasty, left 2023   • Obesity, morbid ([address]) 2022   • Post-traumatic osteoarthritis of left shoulder 2022   • Morbid obesity ([address]) 2022   • Cellulitis 2022   • Diabetic ulcer of left foot associated with type 2 diabetes mellitus ([address]) 2022   • Leukopenia 2022   • Insomnia 2020   • Elevated troponin 2020   • Chest pressure 2020   • Alcohol abuse 2020   • Insulin-dependent diabetes mellitus 2020   • Essential hypertension 2020   • ABILIO (acute kidney injury) ([address]) 2020   • Cholelithiasis    • Alcoholic cirrhosis ([address])    • MEG (obstructive sleep apnea) 2020   • Pancytopenia ([address]) 2020   • Thrombocytopenia ([address]) 2020      LOS (days): 0  Geometric Mean LOS (GMLOS) (days): 4.90  Days to GMLOS:4.7     OBJECTIVE:    Risk of Unplanned Readmission Score: 12.84         Current admission status: Inpatient       Preferred Pharmacy:   [address]  [address] [address]  Phone: [phone] Fax: [phone]    Primary Care Provider: Zenaida Carmen MD    Primary Insurance: MEDICARE  Secondary Insurance: Xiomara Saab    ASSESSMENT:  Active [address], [address] - Spouse   Primary Phone: [phone] (Mobile)  Home Phone: [phone]                         Readmission Root Cause  30 Day Readmission: No    Patient Information  Admitted from[de-identified] Home  Mental Status: Alert  During Assessment patient was accompanied by: Spouse  Assessment information provided by[de-identified] Patient  Support Systems: Self, Spouse/significant other  Home entry access options.  Select all that apply.: Stairs  Number of steps to enter home.: 1  Type of Current Residence: [place]  In the last 12 months, was there a time when you were not able to pay the mortgage or rent on time?: No  In the last 12 months, was there a time when you did not have a steady place to sleep or slept in a shelter (including now)?: No  Homeless/housing insecurity resource given?: N/A  Living Arrangements: Lives w/ Spouse/significant other    Activities of Daily Living Prior to Admission  Functional Status: Independent  Completes ADLs independently?: Yes  Ambulates independently?: Yes  Does patient use assisted devices?: No  Does patient currently own DME?: Yes  What DME does the patient currently own?: Darroll Marking  Does patient have a history of Outpatient Therapy (PT/OT)?: Yes  Does the patient have a history of Short-Term Rehab?: No  Does patient have a history of HHC?: Yes  Does patient currently have [address]?: No         Patient Information Continued  Income Source: Pension/shelter  Does patient have prescription coverage?: Yes  Within the past 12 months, you worried that your food would run out before you got the money to buy more.: Never true  Within the past 12 months, the food you bought just didn't last and you didn't have money to get more.: Never true  Food insecurity resource given?: N/A  Does patient receive dialysis treatments?: No  Does patient have a history of substance abuse?: No  Does patient have a history of Mental Health Diagnosis?: No         Means of Transportation  Means of Transport to [facility][de-identified] Family transport  In the past 12 months, has lack of transportation kept you from medical appointments or from getting medications?: No  In the past 12 months, has lack of transportation kept you from meetings, work, or from getting things needed for daily living?: No  Was application for public transport provided?: N/A

## 2023-09-08 NOTE — ASSESSMENT & PLAN NOTE
· Initially underwent arthroplasty 8/5/41 complicated by surgical wound infection. Antibiotic spacer was placed 6/13/23.  Completed 6 weeks of IV Daptomycin on 7/28/23  · S/p Washout/Spacer removal with revision of reverse shoulder arthoplasty  · POD #0   · Plan per Primary team- Orthopedic Surgery  · Continue Supportive Care  · Pain management  · PT/OT cosnulted

## 2023-09-08 NOTE — PHYSICAL THERAPY NOTE
Physical Therapy Screen    Patient Name: Amarjit MORALES Date: 9/8/2023     Problem List  Principal Problem:    S/P reverse total shoulder arthroplasty, left  Active Problems:    Essential hypertension    Alcoholic cirrhosis (HCC)    MEG (obstructive sleep apnea)    Pancytopenia (HCC)    Stage 3a chronic kidney disease ([address])    Pyogenic arthritis of left shoulder region [facility])       Past Medical History  Past Medical History:   Diagnosis Date    Arrhythmia     Chronic kidney disease     COVID 12/2020    CPAP (continuous positive airway pressure) dependence     Diabetes mellitus ([address])     History of echocardiogram 11/14/2017    showed EF of 50-55 percentWith moderate LVH and left ventricle diastolic dysfunction. Left atrium was moderately enlarged. Trace MR noted. History of Holter monitoring 11/21/2017    showed baseline rhythm of sinus origin with an average heart it of 61 bpm. The lowest heart rate was 49 and the highest heart rate was 10 8 bpm. There were rare single VPCs, and frequent PACs representing 3.2% of total beats. There were several episodes of sinus arrhythmias with sinus bradycardia and heart rate ranging from 40-90 bpm. No sustained dysrhythmias, or pauses noted.  The patient did not    History of transfusion 04/2023    platelets    Liver disease     cirhosis    Obese     Sleep apnea         Past Surgical History  Past Surgical History:   Procedure Laterality Date    CATARACT EXTRACTION      EYE SURGERY Left 1997    FL GUIDED NEEDLE PLAC BX/ASP/INJ  8/28/2023    HERNIA REPAIR  [id number]    JOINT REPLACEMENT Right     TKR    KNEE ARTHROPLASTY Right 2008    [place] ARTHROPLASTY GLENOHUMERAL JOINT TOTAL SHOULDER Left 04/04/2023    Procedure: ARTHROPLASTY SHOULDER REVERSE;  Surgeon: Abigail Rucker MD;  [facility];  Service: Orthopedics    [place] JARED SHOULDER ARTHRPLSTY HUMERAL/GLENOID COMPNT Left 06/13/2023    Procedure: ARTHROPLASTY SHOULDER REVISION;  Surgeon: Shashank Bell;  [facility];  Service: Orthopedics    SHOULDER SURGERY Right 2002    WOUND DEBRIDEMENT Left 05/02/2023    Procedure: INCISION AND DRAINAGE (I&D) EXTREMITY, vac placement;  Surgeon: Nyle Fothergill, MD;  [facility];  Service: Orthopedics        09/08/23 1520   Note Type   Note type Screen   Cancel Reasons Other   Additional Comments PT order received and chart reviewed. Pt reports he has been up walking in his room with very little difficulty. Pt reports his pain is controlled and he has no concerns returning home. OT evaluated pt earlier and confirms that he is mobilizing safely to return home. Pt denies and acute care PT needs at this time. Will discharge PT order without evaluation.      Calderon Cervantes, PT, DPT  [place] Licensure #[id number]

## 2023-09-08 NOTE — DISCHARGE INSTR - AVS FIRST PAGE
Lita Aguilar DO    Orthopedic Surgery, Shoulder/Elbow and Sports Medicine  [facility]   [address] [facility] [address] [facility]  Phone: [phone]    General Post-op Surgical Instructions:    Date of Procedure - 09/08/23     Procedure - Left revision reverse total shoulder    Weight Bearing Status - nonweightbearing left arm in sling    DVT Prophylaxis and Duration - not required    PT/OT Instructions - to be discussed at first post-op    Stitches/Staples - Will be removed at 7-10 days postop; we will then place steristrips on incision site    Wound Care - maintain dressing, keep clean and dry    Xray follow up - to be done at or prior to office visit follow-up if indicated    Additional Info - Please complete your course of antibiotics     Any questions or concerns please call [phone] please!

## 2023-09-08 NOTE — CONSULTS
[facility]  Consult  Name: Richy Licona 69 y.o. male I MRN: [id number]  Unit/Bed#: -01 I Date of Admission: 9/8/2023   Date of Service: 9/8/2023 I Hospital Day: 0    Inpatient consult to Internal Medicine  Consult performed by: Nat Randolph ordered by: Tyrone Trejo PA-C        Assessment/Plan   Urinary retention  Assessment & Plan  Resume flomax    Stage 3a chronic kidney disease [facility])  Assessment & Plan  Lab Results   Component Value Date    EGFR 88 08/18/2023    EGFR 78 07/24/2023    EGFR 58 07/17/2023    CREATININE 0.87 08/18/2023    CREATININE 0.98 07/24/2023    CREATININE 1.25 07/17/2023     · Hx of CKD  · Baseline appears to be Crt 0.8-1.2  · Avoid hypotension and fluctuations in BP  · Avoid nephrotoxins  · Monitor while admitted    Pancytopenia ([address])  Assessment & Plan  · Hx of Alcoholic cirrhosis  · Monitor while admitted    MEG (obstructive sleep apnea)  Assessment & Plan  · CPAP qHS ordered      Alcoholic cirrhosis ([address])  Assessment & Plan  · Follows outpatient with Gastroenterology  · Elevated LFT's at baseline   · Of note, elevated AFP on recent labwork-> Being worked up outpatient   · Monitor while admitted    Essential hypertension  Assessment & Plan  · BP reviewed and currently stable    DM2 (diabetes mellitus, type 2) ([address])  Assessment & Plan  Lab Results   Component Value Date    HGBA1C 7.3 (H) 08/18/2023       Recent Labs     09/08/23  0705 09/08/23  1655   POCGLU 117 310*       Blood Sugar Average: Last 72 hrs:  (P) 213.5   · Decrease Lantus to 10 units nightly while admitted and sliding scale  · DC home oral antihyperglycemics    * S/P reverse total shoulder arthroplasty, left  Assessment & Plan    · Initially underwent arthroplasty 2/3/56 complicated by surgical wound infection. Antibiotic spacer was placed 6/13/23.  Completed 6 weeks of IV Daptomycin on 7/28/23  · S/p Washout/Spacer removal with revision of reverse shoulder arthoplasty  · POD #0 · Plan per Primary team- Orthopedic Surgery  · Continue Supportive Care  · Pain management  · PT/OT cosnulted           VTE Prophylaxis:   High Risk (Score >/= 5) - Pharmacological DVT Prophylaxis Contraindicated. Sequential Compression Devices Ordered. Recommendations for Discharge:  · None at this time    Total Time Spent on Date of Encounter in care of patient: 55 minutes This time was spent on one or more of the following: performing physical exam; counseling and coordination of care; obtaining or reviewing history; documenting in the medical record; reviewing/ordering tests, medications or procedures; communicating with other healthcare professionals and discussing with patient's family/caregivers. Collaboration of Care: Were Recommendations Directly Discussed with Primary Treatment Team? Yes    History of Present Illness:  Ana Alvarez is a 69 y.o. male who is originally admitted to the Orthopedic surgery service service due to left reverse total shoulder revision. We are consulted for medical comanagement of patient's medical problems including CKD, type 2 diabetes mellitus, cirrhosis among others. Gideon Vivas is seen and examined after his revision procedure on the med/surg floor. He states his procedure went well and has no significant pain. He states that sometimes he needs an oxycodone to help him get to sleep and he may requested at night. He states that his CKD is stable and he does not see a nephrologist regularly. He takes glimepiride and Lantus at home. No other complaints or concerns at this time    Review of Systems:  Review of Systems   Constitutional: Negative for fatigue. Respiratory: Negative for cough, shortness of breath and wheezing. Cardiovascular: Negative for palpitations. Gastrointestinal: Negative for abdominal pain, nausea and vomiting. Genitourinary: Negative for dysuria. Skin: Negative for rash. Neurological: Negative for syncope.        Past Medical and Surgical History:   Past Medical History:   Diagnosis Date   • Arrhythmia    • Chronic kidney disease    • COVID 12/2020   • CPAP (continuous positive airway pressure) dependence    • Diabetes mellitus ([address])    • History of echocardiogram 11/14/2017    showed EF of 50-55 percentWith moderate LVH and left ventricle diastolic dysfunction. Left atrium was moderately enlarged. Trace MR noted. • History of Holter monitoring 11/21/2017    showed baseline rhythm of sinus origin with an average heart it of 61 bpm. The lowest heart rate was 49 and the highest heart rate was 10 8 bpm. There were rare single VPCs, and frequent PACs representing 3.2% of total beats. There were several episodes of sinus arrhythmias with sinus bradycardia and heart rate ranging from 40-90 bpm. No sustained dysrhythmias, or pauses noted. The patient did not   • History of transfusion 04/2023    platelets   • Liver disease     cirhosis   • Obese    • Sleep apnea        Past Surgical History:   Procedure Laterality Date   • CATARACT EXTRACTION     • EYE SURGERY Left 1997   • FL GUIDED NEEDLE PLAC BX/ASP/INJ  8/28/2023   • HERNIA REPAIR  [id number]   • JOINT REPLACEMENT Right     TKR   • KNEE ARTHROPLASTY Right 2008   • [place] ARTHROPLASTY GLENOHUMERAL JOINT TOTAL SHOULDER Left 04/04/2023    Procedure: ARTHROPLASTY SHOULDER REVERSE;  Surgeon: Elizabeth Horner MD;  [facility];  Service: Orthopedics   • [place] JARED SHOULDER ARTHRPLSTY HUMERAL/GLENOID COMPNT Left 06/13/2023    Procedure: ARTHROPLASTY SHOULDER REVISION;  Surgeon: Charlotte Godinez;  [facility];  Service: Orthopedics   • SHOULDER SURGERY Right 2002   • WOUND DEBRIDEMENT Left 05/02/2023    Procedure: INCISION AND DRAINAGE (I&D) EXTREMITY, vac placement;  Surgeon: Elizabeth Horner MD;  [facility];  Service: Orthopedics       Meds/Allergies:  all medications and allergies reviewed    Allergies:    Allergies   Allergen Reactions   • Sulfa Antibiotics Hives   • Daptomycin Other (See Comments)     Possibly contributed to ABILIO on 2023 admission        Social History:  Marital Status: /Civil Union  Substance Use History:   Social History     Substance and Sexual Activity   Alcohol Use Not Currently    Comment: quit      Social History     Tobacco Use   Smoking Status Former   • Packs/day: 0.50   • Years: 20.00   • Total pack years: 10.00   • Types: Cigarettes   • Quit date:    • Years since quittin.7   Smokeless Tobacco Never     Social History     Substance and Sexual Activity   Drug Use Never       Family History:  Family History   Problem Relation Age of Onset   • Diabetes Mother    • Supraventricular tachycardia Mother    • COPD Father    • Heart disease Father    • Other Father         Sepsis; related to UTI with complicating cardiac problems   • Heart disease Paternal Grandmother        Physical Exam:   Vitals:   Blood Pressure: 144/78 (23 1530)  Pulse: 101 (23 153)  Temperature: 97.5 °F (36.4 °C) (23 1530)  Temp Source: Oral (23 153)  Respirations: 20 (23 153)  Height: 5' 11" (180.3 cm) (23 0659)  Weight - Scale: 111 kg (245 lb) (23 0659)  SpO2: 99 % (23 1530)    Physical Exam  Vitals and nursing note reviewed. Constitutional:       General: He is not in acute distress. Appearance: He is well-developed. He is obese. He is not toxic-appearing or diaphoretic. HENT:      Head: Normocephalic and atraumatic. Mouth/Throat:      Mouth: Mucous membranes are moist.   Eyes:      General: No scleral icterus. Extraocular Movements: Extraocular movements intact. Conjunctiva/sclera: Conjunctivae normal.   Cardiovascular:      Rate and Rhythm: Normal rate and regular rhythm. Pulses: Normal pulses. Heart sounds: No murmur heard. No friction rub. No gallop. Pulmonary:      Effort: Pulmonary effort is normal. No respiratory distress. Breath sounds: Normal breath sounds. No wheezing, rhonchi or rales. Abdominal:      General: Abdomen is flat. Bowel sounds are normal. There is no distension. Palpations: Abdomen is soft. There is no mass. Tenderness: There is no abdominal tenderness. There is no guarding. Musculoskeletal:         General: No swelling. Cervical back: Neck supple. Right lower leg: No edema. Left lower leg: No edema. Comments: LUE in sling   Lymphadenopathy:      Cervical: No cervical adenopathy. Skin:     General: Skin is warm and dry. Capillary Refill: Capillary refill takes less than 2 seconds. Coloration: Skin is not jaundiced. Findings: No rash. Comments: Incision clean, dry, intact   Neurological:      General: No focal deficit present. Mental Status: He is alert and oriented to person, place, and time. Sensory: No sensory deficit. Motor: No weakness. Psychiatric:         Mood and Affect: Mood normal.          Additional Data:   Lab Results:                    Lab Results   Component Value Date/Time    HGBA1C 7.3 (H) 08/18/2023 08:06 AM    HGBA1C 8.4 (H) 03/20/2023 08:09 AM    HGBA1C 7.3 (H) 11/23/2022 07:02 AM    HGBA1C 7.2 (H) 11/18/2022 07:24 AM    HGBA1C 7.2 11/18/2022 12:00 AM    HGBA1C 7.7 (H) 09/12/2022 07:50 AM     Results from last 7 days   Lab Units 09/08/23  1655 09/08/23  0705   POC GLUCOSE mg/dl 310* 117           Imaging: Reviewed radiology reports from this admission including: xray(s)  XR shoulder left 2+ views   Final Result by Carlos De Leon MD (09/08 1315)      No acute osseous abnormality. Status post left shoulder arthroplasty in near-anatomic alignment. Workstation performed: [id number]         XR shoulder 1 vw left   Final Result by Nila Moreno MD (09/08 1034)      Fluoroscopic guidance provided for procedure guidance. Please refer to the separate procedure notes for additional details.             Workstation performed: [id number]             EKG, Pathology, and Other Studies Reviewed on Admission:   · EKG: No EKG obtained. ** Please Note: This note may have been constructed using a voice recognition system.  **

## 2023-09-08 NOTE — ANESTHESIA POSTPROCEDURE EVALUATION
Post-Op Assessment Note    CV Status:  Stable    Pain management: adequate     Mental Status:  Alert and awake   Hydration Status:  Euvolemic   PONV Controlled:  Controlled   Airway Patency:  Patent  Airway: intubated      Post Op Vitals Reviewed: Yes            No notable events documented.     /68 (09/08/23 1051)    Temp (!) 96.9 °F (36.1 °C) (09/08/23 1051)    Pulse 85 (09/08/23 1051)   Resp 12 (09/08/23 1051)    SpO2 98 % (09/08/23 1051)

## 2023-09-08 NOTE — ASSESSMENT & PLAN NOTE
Lab Results   Component Value Date    EGFR 88 08/18/2023    EGFR 78 07/24/2023    EGFR 58 07/17/2023    CREATININE 0.87 08/18/2023    CREATININE 0.98 07/24/2023    CREATININE 1.25 07/17/2023     · Hx of CKD  · Baseline appears to be Crt 0.8-1.2  · Avoid hypotension and fluctuations in BP  · Avoid nephrotoxins  · Monitor while admitted

## 2023-09-08 NOTE — ASSESSMENT & PLAN NOTE
· Follows outpatient with Gastroenterology  · Elevated LFT's at baseline   · Of note, elevated AFP on recent labwork-> Being worked up outpatient   · Monitor while admitted

## 2023-09-08 NOTE — ASSESSMENT & PLAN NOTE
Lab Results   Component Value Date    HGBA1C 7.3 (H) 08/18/2023       Recent Labs     09/08/23  0705 09/08/23  1655   POCGLU 117 310*       Blood Sugar Average: Last 72 hrs:  (P) 213.5   · Decrease Lantus to 10 units nightly while admitted and sliding scale  · DC home oral antihyperglycemics

## 2023-09-08 NOTE — OP NOTE
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 incision and debridement. with revision reverse shoulder arthroplasty    Specimen(s):  ID Type Source Tests Collected by Time Destination   A : LEFT SHOULDER SURGICAL WOUND Wound Wound ANAEROBIC CULTURE AND GRAM STAIN, WOUND CULTURE [facility] 2023 0815    B : DELTO-PECTORAL INTERVAL Wound Wound ANAEROBIC CULTURE AND GRAM STAIN, WOUND CULTURE [facility] 2023 0819    C : CHARLI SPACER TISSUE Tissue Soft Tissue, Other CULTURE, TISSUE AND GRAM STAIN [facility] 2023 0820    D : SUBCUTANOUS SUTURE LEFT SHOULDER Tissue Suture CULTURE, TISSUE AND GRAM STAIN [facility] 2023 0820    E : INTRAMEDULLARY CANAL LEFT SHOULDER Tissue Soft Tissue, Other CULTURE, TISSUE AND GRAM STAIN [facility] 2023 0834    F : GLENOID SOFT TISSUE LEFT SHOULDER Tissue Soft Tissue, Other CULTURE, TISSUE AND GRAM STAIN [facility] 2023 0848        Estimated Blood Loss:   100 mL    Drains:  * No LDAs found *    Anesthesia Type:   General w/ Interscalene Block    Operative Indications:  S/P infected reverse total shoulder arthroplasty, left requiring explantation and placement of antibiotic spacer with required two-stage implantation reverse shoulder arthroplasty [A01.374]      Operative Findings:  Scar tissue deltopectoral interval.  No signs of necrotic infected tissue.   Synovial fluid clear serous fluid    Complications:   None    Implants: Tornier: 25 full wedge baseplate, 36 standard glenosphere, 2A flex stem 135 degree construct, high offset tray, 6C polyethylene    Procedure and Technique:  The patient was seen in the preoperative holding area with operative extremity was marked. He was taken to the operative room and placed in the supine position. His left upper extremity was prepped and draped in usual sterile fashion. A timeout was called the patient was administered Ancef 2 g IV prior to incision. Using a 10 blade knife incision was made over the previous surgical scar. Subcu dissection was taken down with the Bovie cautery. The subcutaneous tissue was cultured with culture swabs. Hemostasis undertaken with Bovie cautery. Further dissection with Bovie cautery encountered the previous deltopectoral interval with Monocryl sutures that were identified and released and sent for culture. The tissue appeared clean and showed no signs of infection. Further dissection revealed synovial fluid from the deltopectoral interval near the joint which showed to be clear serous fluid. Culture swabs were taken of the synovial fluid and this was sent for culture. The antibiotics patient was then encountered and celina-implant tissue was sent for culture. This tissue appeared clean and did not appear infectious. The antibiotic spacer was then removed using osteotomes to remove the cement mantle and using a mallet and osteotome to remove the antibiotic spacer from the intramedullary canal.  The proximal bone was of adequate quality and there was no bone loss with removal of the antibiotic spacer. The intramedullary canal was debrided using a curette and the hemorrhagic noninfectious film of tissue superficial to the endosteal bone was sent for culture. The bone appeared clean and did not show any signs of infection. This was copiously irrigated.   A trial stem was implanted with a humeral protection plate to protect the proximal humerus from fracture during glenoid exposure. The glenoid was then exposed and showed to be very difficult to expose due to the tight scar tissue. The anterior aspect of the supraspinatus tendon attachment was detached using Bovie cautery to allow for further excursion of the proximal humerus and further exposure of the glenoid. Retractors were placed around the glenoid and adequate exposure was then achieved. The glenoid appeared to be none infectious. The tissue at the articular surface was debrided with a curette and sent for culture. This tissue was of pink discoloration but did not appear to be infectious. This tissue was sent for culture. The previous screw holes were also debrided of scar tissue using a curette and this also appeared to be clean. The bone was of great quality and did not have any soft areas. A central pin was placed through the previous boss hole. A boss reamer was then used to ream the boss hole in the glenoid. A drill was then also used to perforate the far cortex to allow for a long central screw to gain purchase. The baseplate was then inserted and seated onto the surface of the glenoid with great fixation of the central screw. The anterior, posterior, inferior screw holes were then drilled and fitted with the appropriate sized locking screws for further fixation of the baseplate. The appropriate plaster was then placed and the central screw was locked into place. Once the glenosphere was placed attention was brought back to the humerus. The humeral protection plate was removed and a trial tray and poly was placed. Reduction of the humerus was performed and the shoulder was placed through range of motion and stability check. The shoulder was able to range 140 degrees of flexion and 40 degrees of external rotation without any signs of instability. Shuck test demonstrated adequate stability with no ability to place the finger between the baseplate and the trial poly.   The humerus was dislocated and the trial implants were removed. The humeral canal again was adequately irrigated and debrided to allow for exposed endosteal bone for allow for bony on growth onto the stem. The final humeral implant was then impacted into place. The humerus was reduced and again showed great range of motion and stability. Intraoperative fluoroscopy demonstrated no intraoperative fractures of the humerus with adequately placed implants. The wound was copiously irrigated. Vancomycin powder was placed deep to the deltopectoral interval.  The deltopectoral interval was closed with #1 PDS suture. Vancomycin powder was then placed superficial to the deltopectoral interval.  The skin was closed with 2-0 and 3-0 Monocryl suture. Exofin was placed. Mepilex dressing was placed. The patient was placed in a shoulder immobilizer. There were no complications throughout the case. The patient tolerated the procedure well. I was present for the entire procedure., A qualified resident physician was not available. and A physician assistant was required during the procedure for retraction, tissue handling, dissection and suturing.     Patient Disposition:  PACU         SIGNATURE: Lita Taylorastrid  DATE: September 8, 2023  TIME: 10:26 AM

## 2023-09-08 NOTE — ANESTHESIA PROCEDURE NOTES
Peripheral Block    Patient location during procedure: holding area  Start time: 9/8/2023 7:35 AM  Reason for block: at surgeon's request and post-op pain management  Staffing  Performed by: Darwin Valerio MD  Authorized by: Darwin Valerio MD    Preanesthetic Checklist  Completed: patient identified, IV checked, site marked, risks and benefits discussed, surgical consent, monitors and equipment checked, pre-op evaluation and timeout performed  Peripheral Block  Prep: ChloraPrep  Patient monitoring: frequent blood pressure checks, continuous pulse oximetry and heart rate  Block type: Interscalene  Laterality: left  Injection technique: single-shot  Procedures: ultrasound guided, Ultrasound guidance required for the procedure to increase accuracy and safety of medication placement and decrease risk of complications.   Ultrasound permanent image savedbupivacaine (PF) (MARCAINE) 0.5 % injection 20 mL - Perineural   10 mL - 9/8/2023 7:35:00 AM  bupivacaine liposomal (EXPAREL) 1.3 % injection 20 mL - Perineural   20 mL - 9/8/2023 7:35:00 AM  Needle  Needle type: Stimuplex   Needle length: 2 in  Needle localization: anatomical landmarks and ultrasound guidance  Assessment  Injection assessment: incremental injection, frequent aspiration, injected with ease, negative aspiration, negative for heart rate change, no paresthesia on injection, no symptoms of intraneural/intravenous injection and needle tip visualized at all times  Paresthesia pain: immediately resolved  Post-procedure:  site cleaned  patient tolerated the procedure well with no immediate complications

## 2023-09-08 NOTE — ANESTHESIA PREPROCEDURE EVALUATION
Procedure:  removal of antibiotic spacer with revision total shoulder arthroplasty (Left: Shoulder)    Relevant Problems   CARDIO   (+) Essential hypertension   (+) Murmur      ENDO   (+) Insulin-dependent diabetes mellitus      GI/HEPATIC   (+) Alcoholic cirrhosis (HCC)      /RENAL   (+) ABILIO (acute kidney injury) ([address])   (+) Acute kidney injury superimposed on chronic kidney disease stage 3      HEMATOLOGY   (+) Pancytopenia (HCC)   (+) Thrombocytopenia (HCC)      MUSCULOSKELETAL   (+) Post-traumatic osteoarthritis of left shoulder   (+) Pyogenic arthritis of left shoulder region (HCC)      PULMONARY   (+) MEG (obstructive sleep apnea)      Digestive   (+) Cholelithiasis      Endocrine   (+) Diabetic ulcer of left foot associated with type 2 diabetes mellitus (HCC)      Musculoskeletal and Integument   (+) Ulcer of left foot, limited to breakdown of skin (HCC)      Other   (+) Alcohol abuse   (+) Cellulitis   (+) Chest pressure   (+) Insomnia   (+) Leukopenia   (+) Morbid obesity (HCC)   (+) Obesity, morbid (HCC)   (+) Postoperative infection of surgical wound   (+) S/P reverse total shoulder arthroplasty, left      Lab Results   Component Value Date    SODIUM 138 08/18/2023    K 4.6 08/18/2023     (H) 08/18/2023    CO2 21 08/18/2023    AGAP 6 08/18/2023    BUN 18 08/18/2023    CREATININE 0.87 08/18/2023    GLUC 127 08/18/2023    GLUF 153 (H) 03/20/2023    CALCIUM 9.6 08/18/2023    AST 73 (H) 09/01/2023    ALT 71 (H) 09/01/2023    ALKPHOS 222 (H) 09/01/2023    TP 7.0 09/01/2023    TBILI 1.21 (H) 09/01/2023    EGFR 88 08/18/2023     Lab Results   Component Value Date    WBC 2.96 (L) 09/01/2023    HGB 11.1 (L) 09/01/2023    HCT 36.2 (L) 09/01/2023    MCV 85 09/01/2023    PLT 57 (L) 09/01/2023         Physical Exam    Airway    Mallampati score: II  TM Distance: <3 FB  Neck ROM: full     Dental       Cardiovascular      Pulmonary      Other Findings        Anesthesia Plan  ASA Score- 3     Anesthesia Type- general with ASA Monitors. Additional Monitors:   Airway Plan:     Comment: + interscalene block. Plan Factors-Exercise tolerance (METS): >4 METS. Chart reviewed. EKG reviewed. Imaging results reviewed. Existing labs reviewed. Patient summary reviewed. Induction- intravenous. Postoperative Plan- Plan for postoperative opioid use. Informed Consent- Anesthetic plan and risks discussed with patient. I personally reviewed this patient with the CRNA. Discussed and agreed on the Anesthesia Plan with the CRNA. Carin Ann

## 2023-09-08 NOTE — OCCUPATIONAL THERAPY NOTE
Occupational Therapy Evaluation     Patient Name: Chapis Yung  [id number] Date: 9/8/2023  Problem List  Principal Problem:    Pyogenic arthritis of left shoulder region [facility])  Active Problems:    S/P reverse total shoulder arthroplasty, left    Past Medical History  Past Medical History:   Diagnosis Date    Arrhythmia     Chronic kidney disease     COVID 12/2020    CPAP (continuous positive airway pressure) dependence     Diabetes mellitus ([address])     History of echocardiogram 11/14/2017    showed EF of 50-55 percentWith moderate LVH and left ventricle diastolic dysfunction. Left atrium was moderately enlarged. Trace MR noted. History of Holter monitoring 11/21/2017    showed baseline rhythm of sinus origin with an average heart it of 61 bpm. The lowest heart rate was 49 and the highest heart rate was 10 8 bpm. There were rare single VPCs, and frequent PACs representing 3.2% of total beats. There were several episodes of sinus arrhythmias with sinus bradycardia and heart rate ranging from 40-90 bpm. No sustained dysrhythmias, or pauses noted.  The patient did not    History of transfusion 04/2023    platelets    Liver disease     cirhosis    Obese     Sleep apnea      Past Surgical History  Past Surgical History:   Procedure Laterality Date    CATARACT EXTRACTION      EYE SURGERY Left 1997    FL GUIDED NEEDLE PLAC BX/ASP/INJ  8/28/2023    HERNIA REPAIR  [id number]    JOINT REPLACEMENT Right     TKR    KNEE ARTHROPLASTY Right 2008    OR ARTHROPLASTY GLENOHUMERAL JOINT TOTAL SHOULDER Left 04/04/2023    Procedure: ARTHROPLASTY SHOULDER REVERSE;  Surgeon: Gita Montes MD;  Location: [facility];  Service: Orthopedics    OR JARED SHOULDER ARTHRPLSTY HUMERAL/GLENOID COMPNT Left 06/13/2023    Procedure: ARTHROPLASTY SHOULDER REVISION;  Surgeon: Prabhu Ibrahim;  Location: [facility];  Service: Orthopedics    SHOULDER SURGERY Right 2002    WOUND DEBRIDEMENT Left 05/02/2023    Procedure: INCISION AND DRAINAGE (I&D) EXTREMITY, vac placement;  Surgeon: Colette Tellez MD;  Location: [facility];  Service: Orthopedics           09/08/23 1350   OT Last Visit   OT Visit Date 09/08/23   Note Type   Note type Evaluation   Pain Assessment   Pain Assessment Tool 0-10   Pain Score 1   Pain Location/Orientation Orientation: Left; Location: Shoulder   Pain Radiating Towards L armpit   Pain Onset/Description Onset: Ongoing;Frequency: Constant/Continuous; Descriptor: Burning   Effect of Pain on Daily Activities limits comfort and activity tolerance   Patient's Stated Pain Goal No pain   Hospital Pain Intervention(s) Repositioned; Ambulation/increased activity; Emotional support;Cold applied   Multiple Pain Sites No   Restrictions/Precautions   Weight Bearing Precautions Per Order Yes   LLE Weight Bearing Per Order NWB  (POD#0 L Left - removal of antibiotic spacer I&D with revision reverse shoulder arthroplasty)   Braces or Orthoses Sling  (Abduction sling)   Other Precautions WBS;Pain   Home Living   Type of Home House   Home Layout Two level;Performs ADLs on one level; Able to live on main level with bedroom/bathroom;Stairs to enter with rails  (1 DEIDRE; FFOS to access basement however pt is able to maintain a FFSU)   Bathroom Shower/Tub Tub/shower unit  (Walk-in shower located in the basement)   Bathroom Toilet Standard   Bathroom Equipment Other (Comment)  (None)   [address] Walker;Cane  (RW; [facility])   Prior Function   Level of [place] Independent with ADLs; Independent with functional mobility; Independent with IADLS   Lives With Spouse   Receives Help From Family   IADLs Independent with driving; Independent with meal prep; Independent with medication management   Falls in the last 6 months 0   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    Lifestyle Autonomy At baseline pt is (I) c ADLs/IADLs and functional mobility without use of AD. Pt lives with spouse in a multi level home with (+) DEIDRE. Pt is (+) driving and denies a hx of falls. Reciprocal Relationships Supportive family and friends   Service to Others FT employee   [employer]   General   Additional Pertinent History Pt is POD# 0 Left - removal of antibiotic spacer, I& D with revision reverse shoulder arthroplasty. NWB with LUE in abduction sling. PMHx pt is approx 3 months s/p reverse shoulder arthroplasty explant, incision, and debirdement, and placement of antibiotic spacer, DOS 6/20/23, R TKA, cataracts   Family/Caregiver Present Yes  (Spouse)   Subjective   Subjective "This is my 4th surgery"   ADL   Eating Assistance 6  Modified independent   Grooming Assistance 5  Supervision/Setup   Grooming Deficit Wash/dry hands  (completed standing at the sink; pt displays good compensatory meausres for completing grooming tasks one handed)   [address] 5  Supervision/Setup   LB Bathing Assistance 5  Supervision/Setup   UB Dressing Assistance 5  Supervision/Setup   LB Dressing Assistance 5  [address]  5  Supervision/Setup   Toileting Deficit Clothing management up;Clothing management down;Grab bar use;Perineal hygiene  (use of urinal standing at the toilet)   Additional Comments Pt is limited by NWB of L UE, pain and decreased activity tolerance   Bed Mobility   Supine to Sit 6  Modified independent   Additional items Increased time required;Verbal cues   Sit to Supine   (Unable to assess; pt OOB in recliner chair at the end of the session with all needs in reach)   Additional Comments Pt denies dizziness/lightheadedness with postural changes   Transfers   Sit to Stand 7  Independent   Stand to Sit 7  Independent   Stand pivot 7  Independent   Additional Comments STS completed x 4 t/o the session to/from various self-care surfaces.  Pt displays good insight into safety awareness and optimal body mechanics for safe transfers. No significant LOB or instability noted. Functional Mobility   Functional Mobility 7  Independent   Additional Comments for functional household distance to/from the bathroom without use of AD. Cueing for increased body awareness surrounding L UE while in sling. Balance   Static Sitting Normal   Dynamic Sitting Normal   Static Standing Normal   Dynamic Standing Normal   Activity Tolerance   Activity Tolerance Patient tolerated treatment well   Medical Staff Made Aware Spoke with care coordination, SLIM, PT   Nurse Made Aware Spoke with ULI Bojorquez   RUE Assessment   RUE Assessment WFL  (MMT 5/5 grossly)   LUE Assessment   LUE Assessment X   LUE Strength   LUE Overall Strength Due to  precautions  (NWB status of LUE in abduction sling.)   Hand Function   Gross Motor Coordination Impaired   Fine Motor Coordination Impaired   Hand Function Comments Decreased gross/fine motor coordination in L UE s/p nerve block. Pt sensational deficits, encouraged patient to participate in  strength and gross grasp exercieses. Pt verbalized understanding. Sensation   Light Touch Partial deficits in the LUE  (S/p nerve block)   Vision-Basic Assessment   Current Vision Wears glasses all the time   Visual History Cataracts   Cognition   Overall Cognitive Status WFL   Arousal/Participation Alert; Responsive; Cooperative   Attention Within functional limits   Orientation Level Oriented X4   Memory Within functional limits   Following Commands Follows all commands and directions without difficulty   Assessment   Limitation Decreased ADL status; Decreased UE ROM; Decreased UE strength;Decreased endurance;Decreased self-care trans;Decreased high-level ADLs; Decreased fine motor control;Non-func L UE   Prognosis Good   Assessment Patient is a 69 y.o. male seen for OT evaluation at [facility] following admission on 9/8/2023  s/p Pyogenic arthritis of left shoulder region [facility]). Please see above for comprehensive list of comorbidities and significant PMHx impacting functional performance. At baseline, pt reports being fully independent with ADLs/IADLs and functional mobility w/o use of AD. Upon initial evaluation, pt appears to be performing below baseline functional status. Pt requires supervision for UB ADLs, supervision for LB ADLs, Indepenent for bed mobility, Independent for transfers and  Independent for functional mobility household distance with no AD. The AM-PAC & Barthel Index outcome tools were used to assist in determining pt safety w/self care /mobility and appropriate d/c recommendations, see above for score. Occupational performance is affected by the following deficits: impaired gross motor coordination, impaired fine motor control, impaired sensation  and (+) pain . Personal/Environmental factors impacting D/C include: steps to enter/navigate the home. Supporting factors include: able to maintain FFSU, accessible home environment, support system available and attitude towards recovery Patient would benefit from OT services within the acute care setting to maximize level of functional independence in the following areas fall prevention , self-care transfers and ADLs. From OT standpoint, recommendation at time of D/C would be return to previous environment with no rehabilitation needs. Goals   Patient Goals "to ride my tractor and drive again"   LTG Time Frame 10-14   Long Term Goal See below   Plan   Treatment Interventions ADL retraining;Functional transfer training;UE strengthening/ROM; Endurance training;Patient/family training;Equipment evaluation/education; Compensatory technique education; Activityengagement; Energy conservation   Goal Expiration Date 09/18/23   OT Treatment Day 0   OT Frequency 3-5x/wk   Recommendation   OT Discharge Recommendation No rehabilitation needs   Additional Comments  The patient's raw score on the AM-PAC Daily Activity Inpatient Short Form is 20. A raw score of greater than or equal to 19 suggests the patient may benefit from discharge to home. Please refer to the recommendation of the Occupational Therapist for safe discharge planning. AM-PAC Daily Activity Inpatient   Lower Body Dressing 3   Bathing 3   Toileting 3   Upper Body Dressing 3   Grooming 4   Eating 4   Daily Activity Raw Score 20   Daily Activity Standardized Score (Calc for Raw Score >=11) 42.03   AM-PAC Applied Cognition Inpatient   Following a Speech/Presentation 4   Understanding Ordinary Conversation 4   Taking Medications 4   Remembering Where Things Are Placed or Put Away 4   Remembering List of 4-5 Errands 4   Taking Care of Complicated Tasks 4   Applied Cognition Raw Score 24   Applied Cognition Standardized Score 62.21   Barthel Index   Feeding 10   Bathing 0   Grooming Score 5   Dressing Score 5   Bladder Score 10   Bowels Score 10   Toilet Use Score 10   Transfers (Bed/Chair) Score 15   Mobility (Level Surface) Score 15   Stairs Score 10   Barthel Index Score 90   End of Consult   Education Provided Yes;Family or social support of family present for education by provider  (Spouse present)   Patient Position at End of Consult Bedside chair; All needs within reach   Nurse Communication Nurse aware of consult   End of Consult Comments TSA precautions and handout provided c patient, further reviewed with spouse and patient throughout session. Pt able to verbalized all precautions with good application throughout session. Eduction for proper fit and wearing schedule of abduction sling, as well as donning and doffing. Spouse and patient able to demonstrate donning/doffing sling with verbal cues provided by therapist. Education for skin protection while wearing sling provided. Educaton re: UB dressing, pt verbalized understanding with no futher concerns. All questions and concerns addressed by pt and spouse at this time.      Goals established on initial evaluation in order to achieve pt's goal of driving his tractor    Pt will complete UB ADLs while adhering to shoulder precautions Independent   for increased ADL independence within 10 days. Pt will complete LB ADLs Independent   for increased ADL independence within 10 days. Pt will complete toileting Independent   with use of DME for increased ADL independence within 10 days. Pt will verbalize and demonstrate understanding of s/p reverse TSA B UE HEP program increase strength and endurance during self care transfers within 10 days. Pt will verbalize and demonstrate understanding of WB status in 100% of tx sessions for increased safety and functional mobility. Pt will demonstrate use of pain management techniques PRN for increased activity tolerance and engagement.        Nola Bolton, [address] [facility] OTR/L   [facility] JULIA Licensure# [id number]

## 2023-09-09 VITALS
BODY MASS INDEX: 34.3 KG/M2 | DIASTOLIC BLOOD PRESSURE: 59 MMHG | RESPIRATION RATE: 16 BRPM | HEIGHT: 71 IN | TEMPERATURE: 97.7 F | SYSTOLIC BLOOD PRESSURE: 118 MMHG | HEART RATE: 80 BPM | WEIGHT: 245 LBS | OXYGEN SATURATION: 100 %

## 2023-09-09 LAB
ALBUMIN SERPL BCP-MCNC: 3.2 G/DL (ref 3.5–5)
ALP SERPL-CCNC: 178 U/L (ref 34–104)
ALT SERPL W P-5'-P-CCNC: 45 U/L (ref 7–52)
ANION GAP SERPL CALCULATED.3IONS-SCNC: 6 MMOL/L
AST SERPL W P-5'-P-CCNC: 54 U/L (ref 13–39)
BILIRUB DIRECT SERPL-MCNC: 0.38 MG/DL (ref 0–0.2)
BILIRUB SERPL-MCNC: 1.15 MG/DL (ref 0.2–1)
BUN SERPL-MCNC: 23 MG/DL (ref 5–25)
CALCIUM SERPL-MCNC: 9 MG/DL (ref 8.4–10.2)
CHLORIDE SERPL-SCNC: 103 MMOL/L (ref 96–108)
CO2 SERPL-SCNC: 22 MMOL/L (ref 21–32)
CREAT SERPL-MCNC: 0.96 MG/DL (ref 0.6–1.3)
GFR SERPL CREATININE-BSD FRML MDRD: 80 ML/MIN/1.73SQ M
GLUCOSE SERPL-MCNC: 259 MG/DL (ref 65–140)
GLUCOSE SERPL-MCNC: 266 MG/DL (ref 65–140)
GLUCOSE SERPL-MCNC: 297 MG/DL (ref 65–140)
GLUCOSE SERPL-MCNC: 371 MG/DL (ref 65–140)
POTASSIUM SERPL-SCNC: 4.4 MMOL/L (ref 3.5–5.3)
PROT SERPL-MCNC: 6.7 G/DL (ref 6.4–8.4)
SODIUM SERPL-SCNC: 131 MMOL/L (ref 135–147)

## 2023-09-09 PROCEDURE — 80076 HEPATIC FUNCTION PANEL: CPT

## 2023-09-09 PROCEDURE — 99232 SBSQ HOSP IP/OBS MODERATE 35: CPT | Performed by: STUDENT IN AN ORGANIZED HEALTH CARE EDUCATION/TRAINING PROGRAM

## 2023-09-09 PROCEDURE — 80048 BASIC METABOLIC PNL TOTAL CA: CPT | Performed by: PHYSICIAN ASSISTANT

## 2023-09-09 PROCEDURE — 99024 POSTOP FOLLOW-UP VISIT: CPT | Performed by: PHYSICIAN ASSISTANT

## 2023-09-09 PROCEDURE — 82948 REAGENT STRIP/BLOOD GLUCOSE: CPT

## 2023-09-09 RX ORDER — CEFAZOLIN SODIUM 2 G/50ML
2000 SOLUTION INTRAVENOUS ONCE
Status: COMPLETED | OUTPATIENT
Start: 2023-09-09 | End: 2023-09-09

## 2023-09-09 RX ADMIN — CEFAZOLIN SODIUM 2000 MG: 2 SOLUTION INTRAVENOUS at 09:21

## 2023-09-09 RX ADMIN — INSULIN LISPRO 6 UNITS: 100 INJECTION, SOLUTION INTRAVENOUS; SUBCUTANEOUS at 12:06

## 2023-09-09 RX ADMIN — FUROSEMIDE 20 MG: 20 TABLET ORAL at 09:33

## 2023-09-09 RX ADMIN — B-COMPLEX W/ C & FOLIC ACID TAB 1 TABLET: TAB at 09:10

## 2023-09-09 RX ADMIN — INSULIN LISPRO 3 UNITS: 100 INJECTION, SOLUTION INTRAVENOUS; SUBCUTANEOUS at 09:10

## 2023-09-09 NOTE — PROGRESS NOTES
Progress Note - Orthopedics   Domo Bowden 69 y.o. male MRN: [id number]  Unit/Bed#: -01 Encounter: [id number]      Subjective: POD #1 status post left shoulder removal of antibiotic spacer, I&D, and revision reverse total shoulder arthroplasty. The patient is doing well and notes minimal pain about the shoulder, and notes his block is still working well. He has been in his sling since the time of surgery. He notes paresthesias about the lateral shoulder and thumb, but sensation has returned to the lesser digits. He denies any CP, SOB, dizziness, nausea or vomiting. No acute overnight events. Vitals: Blood pressure 129/71, pulse 92, temperature 97.8 °F (36.6 °C), temperature source Tympanic, resp. rate 18, height 5' 11" (1.803 m), weight 111 kg (245 lb), SpO2 95 %. ,Body mass index is 34.17 kg/m². Intake/Output Summary (Last 24 hours) at 9/9/2023 0737  Last data filed at 9/9/2023 0300  Gross per 24 hour   Intake 2009 ml   Output 400 ml   Net 1609 ml       Invasive Devices     Peripheral Intravenous Line  Duration           Peripheral IV 09/08/23 Distal;Right;Upper;Ventral (anterior) Arm 1 day                Ortho Exam: Alert and oriented X 3 sitting up comfortably in bed. Left shoulder: Mild strike through proximal dressing otherwise dressing CDI. Mild swelling shoulder. Dense paresthesias lateral shoulder. Mild paresthesias left thumb. Sensation intact lesser digits. Patient moves all fingers freely. 2+radial pulse. Lab, Imaging and other studies: I have personally reviewed pertinent lab results.   Recent Results (from the past 24 hour(s))   Wound culture and Gram stain    Collection Time: 09/08/23  8:15 AM    Specimen: Wound   Result Value Ref Range    Gram Stain Result No Polys or Bacteria seen    Wound culture and Gram stain    Collection Time: 09/08/23  8:19 AM    Specimen: Wound   Result Value Ref Range    Gram Stain Result No Polys or Bacteria seen    Culture, tissue and Gram stain Collection Time: 09/08/23  8:20 AM    Specimen: Suture;  Tissue   Result Value Ref Range    Gram Stain Result No Polys or Bacteria seen    Culture, tissue and Gram stain    Collection Time: 09/08/23  8:34 AM    Specimen: Soft Tissue, Other   Result Value Ref Range    Gram Stain Result No Polys or Bacteria seen    Culture, tissue and Gram stain    Collection Time: 09/08/23  8:48 AM    Specimen: Soft Tissue, Other   Result Value Ref Range    Gram Stain Result 1+ Polys     Gram Stain Result No bacteria seen    Fingerstick Glucose (POCT)    Collection Time: 09/08/23  4:55 PM   Result Value Ref Range    POC Glucose 310 (H) 65 - 140 mg/dl   Fingerstick Glucose (POCT)    Collection Time: 09/08/23  8:16 PM   Result Value Ref Range    POC Glucose 420 (H) 65 - 140 mg/dl   Fingerstick Glucose (POCT)    Collection Time: 09/08/23 10:16 PM   Result Value Ref Range    POC Glucose 374 (H) 65 - 140 mg/dl   Fingerstick Glucose (POCT)    Collection Time: 09/09/23  3:40 AM   Result Value Ref Range    POC Glucose 297 (H) 65 - 140 mg/dl   Basic metabolic panel    Collection Time: 09/09/23  5:38 AM   Result Value Ref Range    Sodium 131 (L) 135 - 147 mmol/L    Potassium 4.4 3.5 - 5.3 mmol/L    Chloride 103 96 - 108 mmol/L    CO2 22 21 - 32 mmol/L    ANION GAP 6 mmol/L    BUN 23 5 - 25 mg/dL    Creatinine 0.96 0.60 - 1.30 mg/dL    Glucose 266 (H) 65 - 140 mg/dL    Calcium 9.0 8.4 - 10.2 mg/dL    eGFR 80 ml/min/1.73sq m   Hepatic function panel    Collection Time: 09/09/23  5:38 AM   Result Value Ref Range    Total Bilirubin 1.15 (H) 0.20 - 1.00 mg/dL    Bilirubin, Direct 0.38 (H) 0.00 - 0.20 mg/dL    Alkaline Phosphatase 178 (H) 34 - 104 U/L    AST 54 (H) 13 - 39 U/L    ALT 45 7 - 52 U/L    Total Protein 6.7 6.4 - 8.4 g/dL    Albumin 3.2 (L) 3.5 - 5.0 g/dL   Fingerstick Glucose (POCT)    Collection Time: 09/09/23  7:17 AM   Result Value Ref Range    POC Glucose 259 (H) 65 - 140 mg/dl           Assessment:  POD #1 status post left shoulder removal of antibiotic spacer, I&D, and revision reverse total shoulder arthroplasty. Plan:  The patient is doing quite well. If cleared by medicine for discharge today we will discharge patient home on one week of po abx. He will remain in his sling at all times. Dressing will remain in place. FU 1 week with Dr. Riri Gandhi.

## 2023-09-09 NOTE — PLAN OF CARE
Problem: PAIN - ADULT  Goal: Verbalizes/displays adequate comfort level or baseline comfort level  Description: Interventions:  - Encourage patient to monitor pain and request assistance  - Assess pain using appropriate pain scale  - Administer analgesics based on type and severity of pain and evaluate response  - Implement non-pharmacological measures as appropriate and evaluate response  - Consider cultural and social influences on pain and pain management  - Notify physician/advanced practitioner if interventions unsuccessful or patient reports new pain  Outcome: Progressing     Problem: INFECTION - ADULT  Goal: Absence or prevention of progression during hospitalization  Description: INTERVENTIONS:  - Assess and monitor for signs and symptoms of infection  - Monitor lab/diagnostic results  - Monitor all insertion sites, i.e. indwelling lines, tubes, and drains  - Monitor endotracheal if appropriate and nasal secretions for changes in amount and color  - [place] appropriate cooling/warming therapies per order  - Administer medications as ordered  - Instruct and encourage patient and family to use good hand hygiene technique  - Identify and instruct in appropriate isolation precautions for identified infection/condition  Outcome: Progressing  Goal: Absence of fever/infection during neutropenic period  Description: INTERVENTIONS:  - Monitor WBC    Outcome: Progressing     Problem: SAFETY ADULT  Goal: Patient will remain free of falls  Description: INTERVENTIONS:  - Educate patient/family on patient safety including physical limitations  - Instruct patient to call for assistance with activity   - Consult OT/PT to assist with strengthening/mobility   - Keep Call bell within reach  - Keep bed low and locked with side rails adjusted as appropriate  - Keep care items and personal belongings within reach  - Initiate and maintain comfort rounds  - Make Fall Risk Sign visible to staff  - Offer Toileting every 2 Hours, in advance of need  - Initiate/Maintain bed alarm  - Obtain necessary fall risk management equipment:     - Apply yellow socks and bracelet for high fall risk patients  - Consider moving patient to room near nurses station  Outcome: Progressing  Goal: Maintain or return to baseline ADL function  Description: INTERVENTIONS:  -  Assess patient's ability to carry out ADLs; assess patient's baseline for ADL function and identify physical deficits which impact ability to perform ADLs (bathing, care of mouth/teeth, toileting, grooming, dressing, etc.)  - Assess/evaluate cause of self-care deficits   - Assess range of motion  - Assess patient's mobility; develop plan if impaired  - Assess patient's need for assistive devices and provide as appropriate  - Encourage maximum independence but intervene and supervise when necessary  - Involve family in performance of ADLs  - Assess for home care needs following discharge   - Consider OT consult to assist with ADL evaluation and planning for discharge  - Provide patient education as appropriate  Outcome: Progressing  Goal: Maintains/Returns to pre admission functional level  Description: INTERVENTIONS:  - Perform BMAT or MOVE assessment daily.   - Set and communicate daily mobility goal to care team and patient/family/caregiver. - Collaborate with rehabilitation services on mobility goals if consulted  - Perform Range of Motion 3 times a day. - Reposition patient every 2 hours.   - Dangle patient 3 times a day  - Stand patient 3 times a day  - Ambulate patient 3 times a day  - Out of bed to chair 3 times a day   - Out of bed for meals 3 times a day  - Out of bed for toileting  - Record patient progress and toleration of activity level   Outcome: Progressing     Problem: DISCHARGE PLANNING  Goal: Discharge to home or other facility with appropriate resources  Description: INTERVENTIONS:  - Identify barriers to discharge w/patient and caregiver  - Arrange for needed discharge resources and transportation as appropriate  - Identify discharge learning needs (meds, wound care, etc.)  - Arrange for interpretive services to assist at discharge as needed  - Refer to Case Management Department for coordinating discharge planning if the patient needs post-hospital services based on physician/advanced practitioner order or complex needs related to functional status, cognitive ability, or social support system  Outcome: Progressing     Problem: Knowledge Deficit  Goal: Patient/family/caregiver demonstrates understanding of disease process, treatment plan, medications, and discharge instructions  Description: Complete learning assessment and assess knowledge base.   Interventions:  - Provide teaching at level of understanding  - Provide teaching via preferred learning methods  Outcome: Progressing

## 2023-09-09 NOTE — ASSESSMENT & PLAN NOTE
· Initially underwent arthroplasty 7/7/60 complicated by surgical wound infection. Antibiotic spacer was placed 6/13/23.  Completed 6 weeks of IV Daptomycin on 7/28/23  · S/p Washout/Spacer removal with revision of reverse shoulder arthoplasty  · POD #1  · Plan per Primary team- Orthopedic Surgery  · Continue Supportive Care  · Pain management  · PT/OT cosnulted

## 2023-09-09 NOTE — ASSESSMENT & PLAN NOTE
Lab Results   Component Value Date    EGFR 80 09/09/2023    EGFR 88 08/18/2023    EGFR 78 07/24/2023    CREATININE 0.96 09/09/2023    CREATININE 0.87 08/18/2023    CREATININE 0.98 07/24/2023     · Hx of CKD  · Baseline appears to be Crt 0.8-1.2  · Avoid hypotension and fluctuations in BP  · Avoid nephrotoxins  · Monitor while admitted

## 2023-09-09 NOTE — PROGRESS NOTES
[facility]  Progress Note  Name: Sheridan Valdivia  MRN: [id number]  Unit/Bed#: -01 I Date of Admission: 9/8/2023   Date of Service: 9/9/2023 I Hospital Day: 1    Assessment/Plan   Urinary retention  Assessment & Plan  Resume flomax    Stage 3a chronic kidney disease [facility])  Assessment & Plan  Lab Results   Component Value Date    EGFR 80 09/09/2023    EGFR 88 08/18/2023    EGFR 78 07/24/2023    CREATININE 0.96 09/09/2023    CREATININE 0.87 08/18/2023    CREATININE 0.98 07/24/2023     · Hx of CKD  · Baseline appears to be Crt 0.8-1.2  · Avoid hypotension and fluctuations in BP  · Avoid nephrotoxins  · Monitor while admitted    Pancytopenia ([address])  Assessment & Plan  · Hx of Alcoholic cirrhosis  · Monitor while admitted    MEG (obstructive sleep apnea)  Assessment & Plan  · CPAP qHS ordered      Alcoholic cirrhosis ([address])  Assessment & Plan  · Follows outpatient with Gastroenterology  · Elevated LFT's at baseline   · Of note, elevated AFP on recent labwork-> Being worked up outpatient   · Monitor while admitted    Essential hypertension  Assessment & Plan  · BP reviewed and currently stable    DM2 (diabetes mellitus, type 2) ([address])  Assessment & Plan  Lab Results   Component Value Date    HGBA1C 7.3 (H) 08/18/2023       Recent Labs     09/08/23  2216 09/09/23  0340 09/09/23  0717 09/09/23  1058   POCGLU 374* 297* 259* 371*       Blood Sugar Average: Last 72 hrs:  (P) 306.5552125665363509   · Decrease Lantus to 10 units nightly while admitted and sliding scale  · DC home oral antihyperglycemics    * S/P reverse total shoulder arthroplasty, left  Assessment & Plan    · Initially underwent arthroplasty 6/9/42 complicated by surgical wound infection. Antibiotic spacer was placed 6/13/23.  Completed 6 weeks of IV Daptomycin on 7/28/23  · S/p Washout/Spacer removal with revision of reverse shoulder arthoplasty  · POD #1  · Plan per Primary team- Orthopedic Surgery  · Continue Supportive Care  · Pain management  · PT/OT cosnulted           VTE Pharmacologic Prophylaxis:   Moderate Risk (Score 3-4) - Pharmacological DVT Prophylaxis Contraindicated. Sequential Compression Devices Ordered. Patient Centered Rounds: I performed bedside rounds with nursing staff today. Discussions with Specialists or Other Care Team Provider: Orthopedics    Education and Discussions with Family / Patient: Patient declined call to . Total Time Spent on Date of Encounter in care of patient: 55 minutes This time was spent on one or more of the following: performing physical exam; counseling and coordination of care; obtaining or reviewing history; documenting in the medical record; reviewing/ordering tests, medications or procedures; communicating with other healthcare professionals and discussing with patient's family/caregivers. Current Length of Stay: 1 day(s)  Current Patient Status: Inpatient   Certification Statement: The patient will continue to require additional inpatient hospital stay due to Postoperative care  Discharge Plan: [facility] is following this patient on consult. They are medically stable for discharge when deemed appropriate by primary service. Code Status: Prior    Subjective:   Seen and examined this morning, patient sitting in chair at bedside and states he feels well. No pains or complaints at present    Objective:     Vitals:   Temp (24hrs), Av.7 °F (36.5 °C), Min:97.5 °F (36.4 °C), Max:97.8 °F (36.6 °C)    Temp:  [97.5 °F (36.4 °C)-97.8 °F (36.6 °C)] 97.7 °F (36.5 °C)  HR:  [] 80  Resp:  [16-20] 16  BP: (109-144)/(59-78) 118/59  SpO2:  [95 %-100 %] 100 %  Body mass index is 34.17 kg/m². Input and Output Summary (last 24 hours): Intake/Output Summary (Last 24 hours) at 2023 1309  Last data filed at 2023 0300  Gross per 24 hour   Intake 709 ml   Output 300 ml   Net 409 ml       Physical Exam:   Physical Exam  Vitals and nursing note reviewed.    Constitutional: General: He is not in acute distress. Appearance: He is well-developed. He is obese. He is not toxic-appearing or diaphoretic. HENT:      Head: Normocephalic and atraumatic. Mouth/Throat:      Mouth: Mucous membranes are moist.   Eyes:      General: No scleral icterus. Extraocular Movements: Extraocular movements intact. Conjunctiva/sclera: Conjunctivae normal.   Cardiovascular:      Rate and Rhythm: Normal rate and regular rhythm. Pulses: Normal pulses. Heart sounds: No murmur heard. No friction rub. No gallop. Pulmonary:      Effort: Pulmonary effort is normal. No respiratory distress. Breath sounds: Normal breath sounds. No wheezing, rhonchi or rales. Abdominal:      General: Abdomen is flat. Bowel sounds are normal. There is no distension. Palpations: Abdomen is soft. There is no mass. Tenderness: There is no abdominal tenderness. There is no guarding. Musculoskeletal:         General: No swelling. Cervical back: Neck supple. Right lower leg: No edema. Left lower leg: No edema. Comments: LUE in sling   Lymphadenopathy:      Cervical: No cervical adenopathy. Skin:     General: Skin is warm and dry. Capillary Refill: Capillary refill takes less than 2 seconds. Coloration: Skin is not jaundiced. Findings: No rash. Comments: Incision clean, dry, intact   Neurological:      General: No focal deficit present. Mental Status: He is alert and oriented to person, place, and time. Sensory: No sensory deficit. Motor: No weakness.    Psychiatric:         Mood and Affect: Mood normal.          Additional Data:     Labs:      Results from last 7 days   Lab Units 09/09/23  0538   SODIUM mmol/L 131*   POTASSIUM mmol/L 4.4   CHLORIDE mmol/L 103   CO2 mmol/L 22   BUN mg/dL 23   CREATININE mg/dL 0.96   ANION GAP mmol/L 6   CALCIUM mg/dL 9.0   ALBUMIN g/dL 3.2*   TOTAL BILIRUBIN mg/dL 1.15*   ALK PHOS U/L 178*   ALT U/L 45   AST U/L 54*   GLUCOSE RANDOM mg/dL 266*         Results from last 7 days   Lab Units 09/09/23  1058 09/09/23  0717 09/09/23  0340 09/08/23  2216 09/08/23 2016 09/08/23  1655 09/08/23  0705   POC GLUCOSE mg/dl 371* 259* 297* 374* 420* 310* 117               Lines/Drains:  Invasive Devices     Peripheral Intravenous Line  Duration           Peripheral IV 09/08/23 Distal;Right;Upper;Ventral (anterior) Arm 1 day              Imaging: Reviewed radiology reports from this admission including: xray(s)    Recent Cultures (last 7 days):   Results from last 7 days   Lab Units 09/08/23  0848 09/08/23  0834 09/08/23  0820 09/08/23  0819 09/08/23  0815   GRAM STAIN RESULT  1+ Polys  No bacteria seen No Polys or Bacteria seen No Polys or Bacteria seen  No Polys or Bacteria seen No Polys or Bacteria seen No Polys or Bacteria seen       Last 24 Hours Medication List:   Current Facility-Administered Medications   Medication Dose Route Frequency Provider Last Rate   • chlorhexidine   Topical Daily PRN Chelsi Westbrook PA-C     • furosemide  20 mg Oral QAM Cheyenne Lassiter     • HYDROmorphone  0.5 mg Intravenous Q2H PRN Chelsi Westbrook PA-C     • insulin glargine  10 Units Subcutaneous HS Cheyenne Lassiter     • insulin lispro  1-6 Units Subcutaneous TID AC Cheyenne Lassiter     • insulin lispro  1-6 Units Subcutaneous HS Cheyenne Lassiter     • multivitamin stress formula  1 tablet Oral Daily Cheyenne Lassiter     • ondansetron  4 mg Intravenous Q6H PRN Chelsi Westbrook PA-C     • oxyCODONE  10 mg Oral Q4H PRN Chelsi Westbrook PA-C     • oxyCODONE  5 mg Oral Q4H PRN Chelsi Westbrook PA-C     • tamsulosin  0.4 mg Oral Daily With Dinner Cheyenne Lassiter     • traZODone  50 mg Oral HS Cheyenne Lassiter          Today, Patient Was Seen By: Cheyenne Lassiter    **Please Note: This note may have been constructed using a voice recognition system. **

## 2023-09-09 NOTE — ASSESSMENT & PLAN NOTE
Lab Results   Component Value Date    HGBA1C 7.3 (H) 08/18/2023       Recent Labs     09/08/23  2216 09/09/23  0340 09/09/23  0717 09/09/23  1058   POCGLU 374* 297* 259* 371*       Blood Sugar Average: Last 72 hrs:  (P) 306.6523934094596551   · Decrease Lantus to 10 units nightly while admitted and sliding scale  · DC home oral antihyperglycemics

## 2023-09-12 ENCOUNTER — OFFICE VISIT (OUTPATIENT)
Dept: PODIATRY | Facility: CLINIC | Age: 68
End: 2023-09-12
Payer: MEDICARE

## 2023-09-12 VITALS
SYSTOLIC BLOOD PRESSURE: 121 MMHG | WEIGHT: 245 LBS | HEART RATE: 79 BPM | DIASTOLIC BLOOD PRESSURE: 72 MMHG | BODY MASS INDEX: 34.17 KG/M2

## 2023-09-12 DIAGNOSIS — L97.522 ULCER OF LEFT FOOT WITH FAT LAYER EXPOSED (HCC): Primary | ICD-10-CM

## 2023-09-12 DIAGNOSIS — Z79.4 TYPE 2 DIABETES MELLITUS WITH DIABETIC NEUROPATHY, WITH LONG-TERM CURRENT USE OF INSULIN (HCC): ICD-10-CM

## 2023-09-12 DIAGNOSIS — E11.40 TYPE 2 DIABETES MELLITUS WITH DIABETIC NEUROPATHY, WITH LONG-TERM CURRENT USE OF INSULIN (HCC): ICD-10-CM

## 2023-09-12 PROCEDURE — 11042 DBRDMT SUBQ TIS 1ST 20SQCM/<: CPT | Performed by: PODIATRIST

## 2023-09-12 NOTE — PROGRESS NOTES
Patient ID: Ashish Stpehen is a 69 y.o. male Date of Birth 1955       Assessment:    No problem-specific Assessment & Plan notes found for this encounter. Diagnoses and all orders for this visit:    Ulcer of left foot with fat layer exposed ([address])  -     Debridement    Type 2 diabetes mellitus with diabetic neuropathy, with long-term current use of insulin ([address])  -     Debridement            Debridement   Universal Protocol:  Consent: Verbal consent obtained. Risks and benefits: risks, benefits and alternatives were discussed  Consent given by: patient  Time out: Immediately prior to procedure a "time out" was called to verify the correct patient, procedure, equipment, support staff and site/side marked as required. Timeout called at: 9/12/2023 9:09 AM.  Patient understanding: patient states understanding of the procedure being performed  Patient identity confirmed: verbally with patient      Performed by: physician  Debridement type: surgical  Level of debridement: subcutaneous tissue    Post-debridement measurements  Length (cm): 1.8  Width (cm): 1.3  Depth (cm): 0.3  Percent debrided: 100%  Surface Area (cm^2): 2.34  Area debrided (cm^2): 2.34  Volume (cm^3): 0.7  Tissue and other material debrided: dermis, epidermis and subcutaneous tissue  Devitalized tissue debrided: callus, fibrin and slough  Instrument(s) utilized: blade  Bleeding: medium  Hemostasis obtained with: pressure  Procedural pain (0-10): 0  Post-procedural pain: 0   Response to treatment: procedure was tolerated well          Plan:  1. Reviewed medical records. Reviewed previous note. 2. He has recurring ulcer in left submet 2 area. Wound debrided. See procedure. Instructed daily local care. Discussed corrective surgery after his wound heals. 3. Start orthowedge shoe. 4. Pt to call the office if any signs of infection develop or significant change in appearance of wound. 5. Awaits new DM shoes. 6. RA in 2 weeks. Subjective:      . RYAN Jon presents for evaluation of left foot. No drainage. No pain. BS under control. The following portions of the patient's history were reviewed and updated as appropriate: allergies, current medications, past family history, past medical history, past social history, past surgical history and problem list.    Review of Systems   Constitutional: Negative for chills and fever. Respiratory: Negative for shortness of breath. Cardiovascular: Negative for chest pain. Gastrointestinal: Negative for diarrhea, nausea and vomiting. Musculoskeletal: Negative for gait problem. Objective:            /72   Pulse 79   Wt 111 kg (245 lb) Comment: verbal  BMI 34.17 kg/m²     Physical Exam  Vitals reviewed. Constitutional:       General: He is not in acute distress. Appearance: Normal appearance. He is not ill-appearing or toxic-appearing. Cardiovascular:      Rate and Rhythm: Normal rate and regular rhythm. Pulses: Normal pulses. Pulmonary:      Effort: Pulmonary effort is normal. No respiratory distress. Musculoskeletal:         General: Deformity present. No tenderness or signs of injury. Skin:     General: Skin is warm. Coloration: Skin is not cyanotic or mottled. Findings: No abscess, erythema or rash. Nails: There is no clubbing. Comments: Keratosis / bogginess / dry blood noted left submet 2 area. Full thickness ulcer presents under the lesion. No cellulitis. No purulence or signs of infection. Neurological:      General: No focal deficit present. Mental Status: He is alert and oriented to person, place, and time. Cranial Nerves: No cranial nerve deficit. Sensory: Sensory deficit present. Coordination: Coordination normal.   Psychiatric:         Mood and Affect: Mood normal.         Behavior: Behavior normal.         Thought Content:  Thought content normal.         Judgment: Judgment normal.

## 2023-09-14 ENCOUNTER — HOSPITAL ENCOUNTER (OUTPATIENT)
Dept: RADIOLOGY | Facility: HOSPITAL | Age: 68
End: 2023-09-14
Attending: ORTHOPAEDIC SURGERY
Payer: MEDICARE

## 2023-09-14 ENCOUNTER — OFFICE VISIT (OUTPATIENT)
Dept: OBGYN CLINIC | Facility: CLINIC | Age: 68
End: 2023-09-14

## 2023-09-14 VITALS
WEIGHT: 245 LBS | BODY MASS INDEX: 34.3 KG/M2 | HEART RATE: 92 BPM | DIASTOLIC BLOOD PRESSURE: 66 MMHG | HEIGHT: 71 IN | SYSTOLIC BLOOD PRESSURE: 136 MMHG

## 2023-09-14 DIAGNOSIS — Z96.612 AFTERCARE FOLLOWING LEFT SHOULDER JOINT REPLACEMENT SURGERY: Primary | ICD-10-CM

## 2023-09-14 DIAGNOSIS — Z96.612 AFTERCARE FOLLOWING LEFT SHOULDER JOINT REPLACEMENT SURGERY: ICD-10-CM

## 2023-09-14 DIAGNOSIS — Z47.1 AFTERCARE FOLLOWING LEFT SHOULDER JOINT REPLACEMENT SURGERY: Primary | ICD-10-CM

## 2023-09-14 DIAGNOSIS — Z47.1 AFTERCARE FOLLOWING LEFT SHOULDER JOINT REPLACEMENT SURGERY: ICD-10-CM

## 2023-09-14 LAB
BACTERIA TISS AEROBE CULT: NO GROWTH
BACTERIA WND AEROBE CULT: NO GROWTH
BACTERIA WND AEROBE CULT: NO GROWTH
GRAM STN SPEC: NORMAL

## 2023-09-14 PROCEDURE — 99024 POSTOP FOLLOW-UP VISIT: CPT | Performed by: ORTHOPAEDIC SURGERY

## 2023-09-14 PROCEDURE — 73030 X-RAY EXAM OF SHOULDER: CPT

## 2023-09-14 NOTE — PROGRESS NOTES
[address]  [address]  [facility] [place] [address]  [phone]       Veronica Bernal  [id number]  1955    ORTHOPAEDIC SURGERY OUTPATIENT NOTE  9/14/2023      HISTORY:  69 y.o. male Jeanine Coburn 6 days status post left shoulder removal of antibiotic spacer, irrigation and debridement, revision reverse shoulder arthroplasty performed on 9/8/2023. Patient has been in a sling since surgery. He is doing well. Past Medical History:   Diagnosis Date   • Arrhythmia    • Chronic kidney disease    • COVID 12/2020   • CPAP (continuous positive airway pressure) dependence    • Diabetes mellitus ([address])    • History of echocardiogram 11/14/2017    showed EF of 50-55 percentWith moderate LVH and left ventricle diastolic dysfunction. Left atrium was moderately enlarged. Trace MR noted. • History of Holter monitoring 11/21/2017    showed baseline rhythm of sinus origin with an average heart it of 61 bpm. The lowest heart rate was 49 and the highest heart rate was 10 8 bpm. There were rare single VPCs, and frequent PACs representing 3.2% of total beats. There were several episodes of sinus arrhythmias with sinus bradycardia and heart rate ranging from 40-90 bpm. No sustained dysrhythmias, or pauses noted.  The patient did not   • History of transfusion 04/2023    platelets   • Liver disease     cirhosis   • Obese    • Sleep apnea        Past Surgical History:   Procedure Laterality Date   • CATARACT EXTRACTION     • EYE SURGERY Left 1997   • FL GUIDED NEEDLE PLAC BX/ASP/INJ  8/28/2023   • HERNIA REPAIR  [id number]   • JOINT REPLACEMENT Right     TKR   • KNEE ARTHROPLASTY Right 2008   • NE ARTHROPLASTY GLENOHUMERAL JOINT TOTAL SHOULDER Left 04/04/2023    Procedure: ARTHROPLASTY SHOULDER REVERSE;  Surgeon: Naomi Mcgee MD;  Location: [facility];  Service: Orthopedics   • NE JARED SHOULDER ARTHRPLSTY HUMERAL/GLENOID COMPNT Left 06/13/2023 Procedure: ARTHROPLASTY SHOULDER REVISION;  Surgeon: Erlin Quinn;  Location: [facility];  Service: Orthopedics   • SHOULDER SURGERY Right    • WOUND DEBRIDEMENT Left 2023    Procedure: INCISION AND DRAINAGE (I&D) EXTREMITY, vac placement;  Surgeon: Loyd Schulz MD;  Location: [facility];  Service: Orthopedics       Social History     Socioeconomic History   • Marital status: /Civil Union     Spouse name: Not on file   • Number of children: 2   • Years of education: 16   • Highest education level: Some college, no degree   Occupational History   • Not on file   Tobacco Use   • Smoking status: Former     Packs/day: 0.50     Years: 20.00     Total pack years: 10.00     Types: Cigarettes     Quit date:      Years since quittin.7   • Smokeless tobacco: Never   Vaping Use   • Vaping Use: Never used   Substance and Sexual Activity   • Alcohol use: Not Currently     Comment: quit    • Drug use: Never   • Sexual activity: Not Currently   Other Topics Concern   • Not on file   Social History Narrative    · Most recent tobacco use screenin2018      · Do you currently or have you served in the [address] [place] [address] [employer]:   No      · Live alone or with others:   with others      · High blood pressure: Yes      · Exercise level:   Occasional      · Overweight:   Yes      · Obese: Yes      · Diabetes:   Yes      Social Determinants of Health     Financial Resource Strain: Not on file   Food Insecurity: No Food Insecurity (2023)    Hunger Vital Sign    • Worried About Running Out of Food in the Last Year: Never true    • Ran Out of Food in the Last Year: Never true   Transportation Needs: No Transportation Needs (2023)    PRAPARE - Transportation    • Lack of Transportation (Medical): No    • Lack of Transportation (Non-Medical):  No   Physical Activity: Not on file   Stress: Not on file   Social Connections: Not on file   Intimate Partner Violence: Not on file   Housing Stability: Low Risk  (9/8/2023)    Housing Stability Vital Sign    • Unable to Pay for Housing in the Last Year: No    • Number of Places Lived in the Last Year: 1    • Unstable Housing in the Last Year: No       Family History   Problem Relation Age of Onset   • Diabetes Mother    • Supraventricular tachycardia Mother    • COPD Father    • Heart disease Father    • Other Father         Sepsis; related to UTI with complicating cardiac problems   • Heart disease Paternal Grandmother         Patient's Medications   New Prescriptions    No medications on file   Previous Medications    BENZONATATE (TESSALON) 200 MG CAPSULE    TAKE 1 CAPSULE BY MOUTH 3 TIMES DAILY AS NEEDED for cough    FUROSEMIDE (LASIX) 20 MG TABLET    Take 1 tablet (20 mg total) by mouth every other day    GLIMEPIRIDE (AMARYL) 4 MG TABLET    Take 4 mg by mouth 2 (two) times a day    INSULIN PEN NEEDLE (BD PEN NEEDLE TERRA 2ND GEN) 32G X 4 MM MISC    For use with insulin pen. Pharmacy may dispense brand covered by insurance.     LANTUS SOLOSTAR 100 UNITS/ML SOPN    Inject 40 Units under the skin daily at bedtime    MULTIPLE VITAMIN (MULTIVITAMIN) CAPSULE    Take 1 capsule by mouth daily    OXYCODONE (ROXICODONE) 5 IMMEDIATE RELEASE TABLET    Take 1 tablet (5 mg total) by mouth every 6 (six) hours as needed for moderate pain for up to 10 days Max Daily Amount: 20 mg    PENICILLIN V POTASSIUM (VEETID) 500 MG TABLET    Take 1 tablet (500 mg total) by mouth every 6 (six) hours for 7 days    TAMSULOSIN (FLOMAX) 0.4 MG    Take 1 capsule (0.4 mg total) by mouth daily with dinner    TRAZODONE (DESYREL) 50 MG TABLET    Take 50 mg by mouth daily at bedtime bedtime   Modified Medications    No medications on file   Discontinued Medications    No medications on file       Allergies   Allergen Reactions   • Sulfa Antibiotics Hives   • Daptomycin Other (See Comments)     Possibly contributed to ABILIO on 6/2023 admission         /66 (BP Location: Right arm, Patient Position: Sitting, Cuff Size: Large)   Pulse 92   Ht 5' 11" (1.803 m)   Wt 111 kg (245 lb)   BMI 34.17 kg/m²      REVIEW OF SYSTEMS:  Constitutional: Negative. HEENT: Negative. Respiratory: Negative. Skin: Negative. Neurological: Negative. Psychiatric/Behavioral: Negative. Musculoskeletal: Negative except for that mentioned in the HPI. /66 (BP Location: Right arm, Patient Position: Sitting, Cuff Size: Large)   Pulse 92   Ht 5' 11" (1.803 m)   Wt 111 kg (245 lb)   BMI 34.17 kg/m²   Gen: No acute distress, resting comfortably in bed  HEENT: Eyes clear, moist mucus membranes, hearing intact  Respiratory: No audible wheezing or stridor  Cardiovascular: Well Perfused peripherally, 2+ distal pulse  Abdomen: nondistended, no peritoneal signs     PHYSICAL EXAM:    Left shoulder:  Incision is clean dry intact  Suture tails were clipped  No erythema or warmth  Sensation tact left upper extremity    IMAGING: X-ray of the left shoulder taken today in the office. This was reviewed and demonstrates stable positioning of reverse total shoulder prosthesis without signs of loosening or failure    ASSESSMENT AND PLAN:  69 y.o. male 6 days status post left shoulder removal of antibiotic spacer, debridement, revision reverse shoulder arthroplasty performed on 9/8/2023. He is doing well. He will start with physical therapy. He was given the reverse total shoulder protocol. His suture tails were clipped. He may shower but do not soak or scrub the incisions. We will see him back in 5 weeks.     Scribe Attestation      I,:  Fani Callejas PA-C am acting as a scribe while in the presence of the attending physician.:       I,:  Uma Cueva personally performed the services described in this documentation    as scribed in my presence.:

## 2023-09-18 ENCOUNTER — EVALUATION (OUTPATIENT)
Dept: PHYSICAL THERAPY | Facility: CLINIC | Age: 68
End: 2023-09-18
Payer: MEDICARE

## 2023-09-18 DIAGNOSIS — Z47.1 AFTERCARE FOLLOWING LEFT SHOULDER JOINT REPLACEMENT SURGERY: ICD-10-CM

## 2023-09-18 DIAGNOSIS — Z96.612 AFTERCARE FOLLOWING LEFT SHOULDER JOINT REPLACEMENT SURGERY: ICD-10-CM

## 2023-09-18 PROCEDURE — 97164 PT RE-EVAL EST PLAN CARE: CPT

## 2023-09-18 NOTE — PROGRESS NOTES
PT Evaluation     Today's date: 2023  Patient name: Sridevi Cheatham  : 1955  MRN: [id number]  Referring provider: Mallroy Sanchez PA-C  Dx:   Encounter Diagnosis     ICD-10-CM    1. Aftercare following left shoulder joint replacement surgery  Z47.1 Ambulatory referral to Physical Therapy    Z96.612                      Assessment  Assessment details: Sridevi Cheatham is a 69 y.o. male who presents post op revision of reverse TSA performed on 2023 with pain, decreased strength, decreased ROM, decreased joint mobility, joint effusion and postural dysfunction. Due to these impairments, patient has difficulty performing ADL's, recreational activities, engaging in social activities, lifting/carrying, transfers, reaching. Patient's clinical presentation is consistent with their referring diagnosis of Aftercare following left shoulder joint replacement surgery. Patient has been educated in post-op contraindications / precautions, home exercise program and plan of care.  Patient would benefit from skilled physical therapy services to address their aforementioned functional limitations and progress towards prior level of function and independence with home exercise program.   Impairments: abnormal or restricted ROM, abnormal movement, activity intolerance, impaired physical strength, lacks appropriate home exercise program, pain with function, poor posture  and poor body mechanics    Goals  Short Term Goals to be accomplished in 4 weeks:  STG1: Pt will be I with HEP to maximize progress between therapy sessions  STG2: Pt will be I with posture management   STG3: Pt will demo shoulder flexion and abduction PROM of at least 90 deg to progress towards AAROM and AROM  STG4: Pt will demo 1/2 MMT strength in shoulder  STG5: Pt will report pain 2/10 in shoulder or less at worst      Long Term Goals to be accomplished in 12 weeks:   LTG1: Pt will demo full cervical AROM to prevent impingement in L shoulder  LTG2: Pt will return to work/household ADLs 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 LTG3: Pt will demo shoulder strength WNL to allow lifting/carrying. LTG4: Pt will demo at least 120 deg of shoulder AROM to improve self care (showering) and household ADLs (opening car door). Plan  Plan details: HEP development, stretching, strengthening, A/AA/PROM, joint mobilizations, posture education, STM/MI as needed to reduce muscle tension, muscle reeducation, PLOC discussed and agreed upon with patient. Patient would benefit from: PT eval and skilled physical therapy  Planned modality interventions: cryotherapy and thermotherapy: hydrocollator packs  Planned therapy interventions: home exercise program, therapeutic exercise, therapeutic activities, self care, patient education, manual therapy, neuromuscular re-education, IASTM and joint mobilization  Frequency: 2x week  Plan of Care beginning date: 2023  Plan of Care expiration date: 2023  Treatment plan discussed with: patient        Subjective Evaluation    History of Present Illness  Date of surgery: 2023  Mechanism of injury: Patient returns to PT post op Reverse TSA revision following hospitalization from infection in original Reverse TSA. Patient has been on hold from PT since May of 2023. Patients original TSA was on 2023, initial infection was noted and treated surgically in may 2023. Patient has been on antibiotics and had spacer placed until revision was performed on 2023 (spacer removed at that time). Patient had stiches removed at most recent follow up and was cleared to shower. Patient denies any numbness or tingling and only has mild edema into L posterior upper arm.    Patient Goals  Patient goals for therapy: decreased pain, increased motion, increased strength, independence with ADLs/IADLs and return to sport/leisure activities    Pain  Current pain ratin  At best pain ratin  At worst pain ratin (hitting bump on "gator" stating it just got bumped and was fine after)  Location: L shoulder  Quality: sharp  Relieving factors: relaxation and rest  Aggravating factors: overhead activity and lifting  Progression: no change    Social Support  Lives with: spouse    Hand dominance: right      Diagnostic Tests  X-ray: normal (hardware in place)  Treatments  Previous treatment: physical therapy        Objective    Objective:     A/PROM: standing  R   L  Shoulder flexion  165   TBA   Shoulder abduction  170   TBA  Shoulder [email or web address]  TBA   TBA  Shoulder IR   TBA   TBA      STRENGTH:    R   L    Shoulder flexion  5/5   NT/5  Shoulder abduction  5/5   NT/5  Shoulder [email or web address]  5/5   NT/5  Shoulder IR   5/5   NT/5    Posture: Pt's sitting posture is WNL with sling donned     Cervical Screen: cervical AROM limitations  Flexion  WNL  Extension Min restrepo  R rotation WNL  L rotation WNL  R sidebend WNL  L sidebend WNL           Precautions: see protocol   Past Medical History:   Diagnosis Date   • Arrhythmia    • Chronic kidney disease    • COVID 12/2020   • CPAP (continuous positive airway pressure) dependence    • Diabetes mellitus ([address])    • History of echocardiogram 11/14/2017    showed EF of 50-55 percentWith moderate LVH and left ventricle diastolic dysfunction. Left atrium was moderately enlarged. Trace MR noted. • History of Holter monitoring 11/21/2017    showed baseline rhythm of sinus origin with an average heart it of 61 bpm. The lowest heart rate was 49 and the highest heart rate was 10 8 bpm. There were rare single VPCs, and frequent PACs representing 3.2% of total beats. There were several episodes of sinus arrhythmias with sinus bradycardia and heart rate ranging from 40-90 bpm. No sustained dysrhythmias, or pauses noted.  The patient did not   • History of transfusion 04/2023    platelets   • Liver disease     cirhosis   • Obese    • Sleep apnea        SOC: 9/18/2023  FOTO: 9/18/2023  POC Expiration: 12/11/2023  Daily Treatment Log:  Date 9/18/2023       Visit # 1       Auth         Auth exp        Manual        Gentle PROM no ext or ER         Take ROM measurements        There Exer        Pendulums         Elbow flexion and extension         Wrist flexion and extension         Ulnar and radial deviation         Supination and pronation         Gripper         digi flex                                         HEP Created and discussed        There Activ                                                        NMReed                                                Modalities                                HEP:   Access Code: [email or web address]  URL: [email or web address] ([email or web address] [employer] in [email or web address])/  Date: 09/18/2023  Prepared by: Luiz Deal    Exercises  - Wrist Extension AROM  - 1-2 x daily - 7 x weekly - 1-2 sets - 10 reps  - Wrist Flexion AROM  - 1-2 x daily - 7 x weekly - 1-2 sets - 10 reps  - Wrist Radial Deviation AROM  - 1-2 x daily - 7 x weekly - 1-2 sets - 10 reps  - Seated Forearm Pronation and Supination AROM  - 1-2 x daily - 7 x weekly - 1-2 sets - 10 reps  - Seated Elbow Flexion and Extension AROM  - 1-2 x daily - 7 x weekly - 1-2 sets - 10 reps

## 2023-09-21 ENCOUNTER — OFFICE VISIT (OUTPATIENT)
Dept: PHYSICAL THERAPY | Facility: CLINIC | Age: 68
End: 2023-09-21
Payer: MEDICARE

## 2023-09-21 DIAGNOSIS — Z47.1 AFTERCARE FOLLOWING LEFT SHOULDER JOINT REPLACEMENT SURGERY: Primary | ICD-10-CM

## 2023-09-21 DIAGNOSIS — Z96.612 AFTERCARE FOLLOWING LEFT SHOULDER JOINT REPLACEMENT SURGERY: Primary | ICD-10-CM

## 2023-09-21 PROCEDURE — 97110 THERAPEUTIC EXERCISES: CPT

## 2023-09-21 NOTE — PROGRESS NOTES
Daily Note     Today's date: 2023  Patient name: Mateusz Franco  : 1955  MRN: [id number]  Referring provider: Krista Kowalski PA-C  Dx:   Encounter Diagnosis     ICD-10-CM    1. Aftercare following left shoulder joint replacement surgery  Z47.1     X20.148                      Subjective: Patient reports he has felt good so far with no pain the last few days. Patient is carefull not to jostle arm to prevent irritation       Objective: See treatment diary below  PROM: LLE  Abd: 80 deg  Flexion: 85 deg       Assessment: Tolerated treatment well with PROM performed. Patient would benefit from continued PT Patient currently limited  By protocol will progress protocol in coming weeks. Plan: Continue per plan of care. Progress treament per protocol. Precautions: see protocol   Past Medical History:   Diagnosis Date   • Arrhythmia    • Chronic kidney disease    • COVID 2020   • CPAP (continuous positive airway pressure) dependence    • Diabetes mellitus ([address])    • History of echocardiogram 2017    showed EF of 50-55 percentWith moderate LVH and left ventricle diastolic dysfunction. Left atrium was moderately enlarged. Trace MR noted. • History of Holter monitoring 2017    showed baseline rhythm of sinus origin with an average heart it of 61 bpm. The lowest heart rate was 49 and the highest heart rate was 10 8 bpm. There were rare single VPCs, and frequent PACs representing 3.2% of total beats. There were several episodes of sinus arrhythmias with sinus bradycardia and heart rate ranging from 40-90 bpm. No sustained dysrhythmias, or pauses noted.  The patient did not   • History of transfusion 2023    platelets   • Liver disease     cirhosis   • Obese    • Sleep apnea        SOC: 2023  FOTO: 2023  POC Expiration: 2023  Daily Treatment Log:  Date 2023      Visit # 1 2      Auth         Atul De Santiago exp        Manual  10'       Gentle PROM no ext or ER   abd and flexion to 80 deg per tolerance       Take ROM measurements  See objective section       There Exer  25'      Pendulums   x20 cw/ccw      Elbow flexion and extension   x20       Wrist flexion and extension   1x10       Ulnar and radial deviation   1x10       Supination and pronation   Red 1x10 ea. Gripper   25# yellow  1x10      digi flex   x10       Yellow Finger extension   x20       therabar twists   Red 1x10 ea. therabar bends  Red 1x10 ea. HEP Created and discussed        There Activ                                                        NMReed                                                Modalities                                HEP:   Access Code: [email or web address]  URL: [email or web address]  Date: 09/18/2023  Prepared by: Moses Moreno    Exercises  - Wrist Extension AROM  - 1-2 x daily - 7 x weekly - 1-2 sets - 10 reps  - Wrist Flexion AROM  - 1-2 x daily - 7 x weekly - 1-2 sets - 10 reps  - Wrist Radial Deviation AROM  - 1-2 x daily - 7 x weekly - 1-2 sets - 10 reps  - Seated Forearm Pronation and Supination AROM  - 1-2 x daily - 7 x weekly - 1-2 sets - 10 reps  - Seated Elbow Flexion and Extension AROM  - 1-2 x daily - 7 x weekly - 1-2 sets - 10 reps

## 2023-09-25 ENCOUNTER — OFFICE VISIT (OUTPATIENT)
Dept: PHYSICAL THERAPY | Facility: CLINIC | Age: 68
End: 2023-09-25
Payer: MEDICARE

## 2023-09-25 DIAGNOSIS — Z96.612 AFTERCARE FOLLOWING LEFT SHOULDER JOINT REPLACEMENT SURGERY: Primary | ICD-10-CM

## 2023-09-25 DIAGNOSIS — Z47.1 AFTERCARE FOLLOWING LEFT SHOULDER JOINT REPLACEMENT SURGERY: Primary | ICD-10-CM

## 2023-09-25 PROCEDURE — 97140 MANUAL THERAPY 1/> REGIONS: CPT

## 2023-09-25 PROCEDURE — 97110 THERAPEUTIC EXERCISES: CPT

## 2023-09-25 NOTE — PROGRESS NOTES
Daily Note     Today's date: 2023  Patient name: Hayde Qureshi  : 1955  MRN: [id number]  Referring provider: Sarah Lawton PA-C  Dx:   Encounter Diagnosis     ICD-10-CM    1. Aftercare following left shoulder joint replacement surgery  Z47.1     Z96.612                      Subjective: Patient reports he continues to feel good, no soreness after session. Denies pain. Objective: See treatment diary below  PROM: L shldr   Abd: 85 deg  Flexion: 90 deg       Assessment: Tolerated treatment well with PROM performed. Patient with no c/o pain t/o session, PROM stopped at first end feel/pt report of stretch. Began scap mobility w/ elevation/depression/circles with good form noted. Patient exhibited good technique with therapeutic exercises and would benefit from continued PT. Plan: Continue per plan of care. Progress treament per protocol. Precautions: see protocol   Past Medical History:   Diagnosis Date   • Arrhythmia    • Chronic kidney disease    • COVID 2020   • CPAP (continuous positive airway pressure) dependence    • Diabetes mellitus ([address])    • History of echocardiogram 2017    showed EF of 50-55 percentWith moderate LVH and left ventricle diastolic dysfunction. Left atrium was moderately enlarged. Trace MR noted. • History of Holter monitoring 2017    showed baseline rhythm of sinus origin with an average heart it of 61 bpm. The lowest heart rate was 49 and the highest heart rate was 10 8 bpm. There were rare single VPCs, and frequent PACs representing 3.2% of total beats. There were several episodes of sinus arrhythmias with sinus bradycardia and heart rate ranging from 40-90 bpm. No sustained dysrhythmias, or pauses noted.  The patient did not   • History of transfusion 2023    platelets   • Liver disease     cirhosis   • Obese    • Sleep apnea        SOC: 2023  FOTO: 2023  POC Expiration: 2023  Daily Treatment Log:  Date 2023 9/25/23     Visit # 1 2 3     Auth         Auth exp        Manual  10'  10'     Gentle PROM no ext or ER   abd and flexion to 80 deg per tolerance  LL      Take ROM measurements  See objective section       There Exer  25' 30'     Pendulums   x20 cw/ccw x20 cw/ccw      Elbow flexion and extension   x20  x20     Wrist flexion and extension   1x10  2x10 seated     Ulnar and radial deviation   1x10  2x10 seated     Supination and pronation   Red 1x10 ea. Red 1x10 ea     Gripper   25# yellow  1x10 25#  2x10       digi flex   x10  2x10      Yellow Finger extension   x20  x20      Therabar twists   Red 1x10 ea. Red 2x10 ea     Therabar bends  Red 1x10 ea. Red 2x10 ea              HEP Created and discussed        There Activ                                                        NMReed        Scap depr/elev   2x10 seated      Scap retract         Scap circles retro   x10                      Modalities                                HEP:   Access Code: [email or web address]  URL: [email or web address]  Date: 09/18/2023  Prepared by: Leia Rojas    Exercises  - Wrist Extension AROM  - 1-2 x daily - 7 x weekly - 1-2 sets - 10 reps  - Wrist Flexion AROM  - 1-2 x daily - 7 x weekly - 1-2 sets - 10 reps  - Wrist Radial Deviation AROM  - 1-2 x daily - 7 x weekly - 1-2 sets - 10 reps  - Seated Forearm Pronation and Supination AROM  - 1-2 x daily - 7 x weekly - 1-2 sets - 10 reps  - Seated Elbow Flexion and Extension AROM  - 1-2 x daily - 7 x weekly - 1-2 sets - 10 reps

## 2023-09-28 ENCOUNTER — OFFICE VISIT (OUTPATIENT)
Dept: PHYSICAL THERAPY | Facility: CLINIC | Age: 68
End: 2023-09-28
Payer: MEDICARE

## 2023-09-28 DIAGNOSIS — Z96.612 AFTERCARE FOLLOWING LEFT SHOULDER JOINT REPLACEMENT SURGERY: Primary | ICD-10-CM

## 2023-09-28 DIAGNOSIS — Z47.1 AFTERCARE FOLLOWING LEFT SHOULDER JOINT REPLACEMENT SURGERY: Primary | ICD-10-CM

## 2023-09-28 PROCEDURE — 97110 THERAPEUTIC EXERCISES: CPT

## 2023-09-28 NOTE — PROGRESS NOTES
Daily Note     Today's date: 2023  Patient name: Ashley Cutler  : 1955  MRN: [id number]  Referring provider: Leif Wiseman PA-C  Dx:   Encounter Diagnosis     ICD-10-CM    1. Aftercare following left shoulder joint replacement surgery  Z47.1     Z96.612           Start Time: 1445  Stop Time: 1530  Total time in clinic (min): 45 minutes    Subjective: Patient reports he continues to feel good, no soreness after session. Denies pain either prior to or post session. Objective: See treatment diary below  PROM: L shldr   Abd: 85 deg  Flexion: 90 deg       Assessment: Tolerated treatment well with PROM performed. Patient with no c/o pain t/o session, PROM stopped at first end feel/pt report of stretch. Continued scap mobility w/ elevation/depression/circles with good form noted. Pt saw approximately a 10 deg objective improvement in both flexion and abd during PROM w/o pain or discomfort. Patient exhibited good technique with therapeutic exercises and would benefit from continued PT. Plan: Continue per plan of care. Progress treament per protocol. Precautions: see protocol   Past Medical History:   Diagnosis Date   • Arrhythmia    • Chronic kidney disease    • COVID 2020   • CPAP (continuous positive airway pressure) dependence    • Diabetes mellitus ([address])    • History of echocardiogram 2017    showed EF of 50-55 percentWith moderate LVH and left ventricle diastolic dysfunction. Left atrium was moderately enlarged. Trace MR noted. • History of Holter monitoring 2017    showed baseline rhythm of sinus origin with an average heart it of 61 bpm. The lowest heart rate was 49 and the highest heart rate was 10 8 bpm. There were rare single VPCs, and frequent PACs representing 3.2% of total beats. There were several episodes of sinus arrhythmias with sinus bradycardia and heart rate ranging from 40-90 bpm. No sustained dysrhythmias, or pauses noted.  The patient did not   • History of transfusion 04/2023    platelets   • Liver disease     cirhosis   • Obese    • Sleep apnea        SOC: 9/18/2023  FOTO: 9/18/2023  POC Expiration: 12/11/2023  Daily Treatment Log:  Date 9/18/2023 9/21/2023 9/25/23 9/28/23    Visit # 1 2 3 4    Auth         Auth exp        Manual  10'  10' 10'    Gentle PROM no ext or ER   abd and flexion to 80 deg per tolerance  LL  SJ     Abd and flexion progressed to 90 deg per tolerance    Take ROM measurements  See objective section       There Exer  25' 30' 25'    Pendulums   x20 cw/ccw x20 cw/ccw  x20 cw/ccw     Elbow flexion and extension   x20  x20 x20    Wrist flexion and extension   1x10  2x10 seated 2x10 seated    Ulnar and radial deviation   1x10  2x10 seated 2x10 seated    Supination and pronation   Red 1x10 ea. Red 1x10 ea Red 1x10 ea    Gripper   25# yellow  1x10 25#  2x10   25#  2x10      digi flex   x10  2x10  2x10    Yellow Finger extension   x20  x20  GB x20    Therabar twists   Red 1x10 ea. Red 2x10 ea Red 2x10 ea    Therabar bends  Red 1x10 ea. Red 2x10 ea  Red 2x10 ea            HEP Created and discussed        There Activ                                                        NMReed        Scap depr/elev   2x10 seated  2x10 seated     Scap retract         Scap circles retro   x10  x10                    Modalities                                HEP:   Access Code: [email or web address]  URL: [email or web address]  Date: 09/18/2023  Prepared by: Meera Wiley    Exercises  - Wrist Extension AROM  - 1-2 x daily - 7 x weekly - 1-2 sets - 10 reps  - Wrist Flexion AROM  - 1-2 x daily - 7 x weekly - 1-2 sets - 10 reps  - Wrist Radial Deviation AROM  - 1-2 x daily - 7 x weekly - 1-2 sets - 10 reps  - Seated Forearm Pronation and Supination AROM  - 1-2 x daily - 7 x weekly - 1-2 sets - 10 reps  - Seated Elbow Flexion and Extension AROM  - 1-2 x daily - 7 x weekly - 1-2 sets - 10 reps

## 2023-10-02 ENCOUNTER — OFFICE VISIT (OUTPATIENT)
Dept: PHYSICAL THERAPY | Facility: CLINIC | Age: 68
End: 2023-10-02
Payer: MEDICARE

## 2023-10-02 DIAGNOSIS — Z47.1 AFTERCARE FOLLOWING LEFT SHOULDER JOINT REPLACEMENT SURGERY: Primary | ICD-10-CM

## 2023-10-02 DIAGNOSIS — Z96.612 AFTERCARE FOLLOWING LEFT SHOULDER JOINT REPLACEMENT SURGERY: Primary | ICD-10-CM

## 2023-10-02 PROCEDURE — 97110 THERAPEUTIC EXERCISES: CPT

## 2023-10-02 NOTE — PROGRESS NOTES
Daily Note     Today's date: 10/2/2023  Patient name: Ashley Cutler  : 1955  MRN: [id number]  Referring provider: Leif Wiseman PA-C  Dx:   Encounter Diagnosis     ICD-10-CM    1. Aftercare following left shoulder joint replacement surgery  Z47.1     Z96.612                      Subjective: Patient reports he has no new complaints at this time       Objective: See treatment diary below       Assessment: Tolerated treatment well with PROM performed. Patient with no c/o pain t/o session, and noted improvements in PROM for flexion and abd. Patient to progress per protocol at next session. Patient exhibited good technique with therapeutic exercises and would benefit from continued PT. Plan: Continue per plan of care. Progress treament per protocol. Precautions: see protocol   Past Medical History:   Diagnosis Date   • Arrhythmia    • Chronic kidney disease    • COVID 2020   • CPAP (continuous positive airway pressure) dependence    • Diabetes mellitus ([address])    • History of echocardiogram 2017    showed EF of 50-55 percentWith moderate LVH and left ventricle diastolic dysfunction. Left atrium was moderately enlarged. Trace MR noted. • History of Holter monitoring 2017    showed baseline rhythm of sinus origin with an average heart it of 61 bpm. The lowest heart rate was 49 and the highest heart rate was 10 8 bpm. There were rare single VPCs, and frequent PACs representing 3.2% of total beats. There were several episodes of sinus arrhythmias with sinus bradycardia and heart rate ranging from 40-90 bpm. No sustained dysrhythmias, or pauses noted.  The patient did not   • History of transfusion 2023    platelets   • Liver disease     cirhosis   • Obese    • Sleep apnea        SOC: 2023  FOTO: 2023  POC Expiration: 2023  Daily Treatment Log:  Date 2023 2023 9/25/23 9/28/23 10/2/23   Visit # 1 2 3 4 5 (FOTO)    Auth         Auth exp        Manual  10'  10' 10' 10'   Gentle PROM no ext or ER   abd and flexion to 80 deg per tolerance  LL  SJ     Abd and flexion progressed to 90 deg per tolerance EG     Flexion to 105 deg and abd to 90 per tolerance    Take ROM measurements  See objective section       There Exer  25' 30' 25' 25'   Pendulums   x20 cw/ccw x20 cw/ccw  x20 cw/ccw  x20 cw/ccw    Elbow flexion and extension   x20  x20 x20 x20   Wrist flexion and extension   1x10  2x10 seated 2x10 seated 2x10 seated   Ulnar and radial deviation   1x10  2x10 seated 2x10 seated 2x10 seated   Supination and pronation   Red 1x10 ea. Red 1x10 ea Red 1x10 ea Red 1x10 ea   Gripper   25# yellow  1x10 25#  2x10   25#  2x10   25#  2x10     digi flex   x10  2x10  2x10 2x10    Yellow Finger extension   x20  x20  GB x20 GB x20   Therabar twists   Red 1x10 ea. Red 2x10 ea Red 2x10 ea Red 2x10 ea   Therabar bends  Red 1x10 ea. Red 2x10 ea  Red 2x10 ea Red 2x10 ea           HEP Created and discussed        There Activ                                                        NMReed        Scap depr/elev   2x10 seated  2x10 seated     Scap retract      2x10 seated    Scap circles retro   x10  x10 x10                    Modalities                                HEP:   Access Code: [email or web address]  URL: [email or web address]  Date: 09/18/2023  Prepared by: Neville Amaral    Exercises  - Wrist Extension AROM  - 1-2 x daily - 7 x weekly - 1-2 sets - 10 reps  - Wrist Flexion AROM  - 1-2 x daily - 7 x weekly - 1-2 sets - 10 reps  - Wrist Radial Deviation AROM  - 1-2 x daily - 7 x weekly - 1-2 sets - 10 reps  - Seated Forearm Pronation and Supination AROM  - 1-2 x daily - 7 x weekly - 1-2 sets - 10 reps  - Seated Elbow Flexion and Extension AROM  - 1-2 x daily - 7 x weekly - 1-2 sets - 10 reps

## 2023-10-05 ENCOUNTER — OFFICE VISIT (OUTPATIENT)
Dept: PHYSICAL THERAPY | Facility: CLINIC | Age: 68
End: 2023-10-05
Payer: MEDICARE

## 2023-10-05 DIAGNOSIS — Z96.612 AFTERCARE FOLLOWING LEFT SHOULDER JOINT REPLACEMENT SURGERY: Primary | ICD-10-CM

## 2023-10-05 DIAGNOSIS — Z47.1 AFTERCARE FOLLOWING LEFT SHOULDER JOINT REPLACEMENT SURGERY: Primary | ICD-10-CM

## 2023-10-05 PROCEDURE — 97110 THERAPEUTIC EXERCISES: CPT

## 2023-10-05 PROCEDURE — 97112 NEUROMUSCULAR REEDUCATION: CPT

## 2023-10-05 NOTE — PROGRESS NOTES
Daily Note     Today's date: 10/5/2023  Patient name: Desi Wolfe  : 1955  MRN: [id number]  Referring provider: Edsel Severs, PA-C  Dx:   Encounter Diagnosis     ICD-10-CM    1. Aftercare following left shoulder joint replacement surgery  Z47.1     P30.289                      Subjective: Patient reports his shoulder remains feeling good. Objective: See treatment diary below       Assessment: Tolerated treatment well with good and pain-free AAROM noted today with intro to phase II of protocol   Patient exhibited good technique with therapeutic exercises and would benefit from continued PT. Plan: Continue per plan of care. Progress treament per protocol. Precautions: see protocol   Past Medical History:   Diagnosis Date   • Arrhythmia    • Chronic kidney disease    • COVID 2020   • CPAP (continuous positive airway pressure) dependence    • Diabetes mellitus ([address])    • History of echocardiogram 2017    showed EF of 50-55 percentWith moderate LVH and left ventricle diastolic dysfunction. Left atrium was moderately enlarged. Trace MR noted. • History of Holter monitoring 2017    showed baseline rhythm of sinus origin with an average heart it of 61 bpm. The lowest heart rate was 49 and the highest heart rate was 10 8 bpm. There were rare single VPCs, and frequent PACs representing 3.2% of total beats. There were several episodes of sinus arrhythmias with sinus bradycardia and heart rate ranging from 40-90 bpm. No sustained dysrhythmias, or pauses noted.  The patient did not   • History of transfusion 2023    platelets   • Liver disease     cirhosis   • Obese    • Sleep apnea        SOC: 2023  FOTO: 10/2/23  POC Expiration: 2023  Daily Treatment Log:  Date 10/5/23        Visit # 6       Manual 5'       Gentle PROM no ext or ER  LL        There Exer 30'       Pendulums  x20 cw/ccw        Supine flex w/ OP UE  2x10 5"        Std cane abd AAROM 2x10        Elbow flexion and extension  2x15        Supination and pronation  W/ hammer 2x15 std       Gripper  35# 2x10        Therabar twists  Red        Therabar bends Red 2x10 ea                HEP        There Activ                                                        NMReed 10'       Scap depr/elev 2x10 std        Scap retract  2x10 std        Scap circles retro 2x10        Deltoid iso's 2x10 5"                Modalities                                HEP:   Access Code: [email or web address]  URL: [email or web address]  Date: 09/18/2023  Prepared by: Marnie Given    Exercises  - Wrist Extension AROM  - 1-2 x daily - 7 x weekly - 1-2 sets - 10 reps  - Wrist Flexion AROM  - 1-2 x daily - 7 x weekly - 1-2 sets - 10 reps  - Wrist Radial Deviation AROM  - 1-2 x daily - 7 x weekly - 1-2 sets - 10 reps  - Seated Forearm Pronation and Supination AROM  - 1-2 x daily - 7 x weekly - 1-2 sets - 10 reps  - Seated Elbow Flexion and Extension AROM  - 1-2 x daily - 7 x weekly - 1-2 sets - 10 reps

## 2023-10-09 ENCOUNTER — TELEPHONE (OUTPATIENT)
Age: 68
End: 2023-10-09

## 2023-10-09 ENCOUNTER — OFFICE VISIT (OUTPATIENT)
Dept: PHYSICAL THERAPY | Facility: CLINIC | Age: 68
End: 2023-10-09
Payer: MEDICARE

## 2023-10-09 ENCOUNTER — TELEPHONE (OUTPATIENT)
Dept: PODIATRY | Facility: CLINIC | Age: 68
End: 2023-10-09

## 2023-10-09 DIAGNOSIS — Z47.1 AFTERCARE FOLLOWING LEFT SHOULDER JOINT REPLACEMENT SURGERY: Primary | ICD-10-CM

## 2023-10-09 DIAGNOSIS — Z96.612 AFTERCARE FOLLOWING LEFT SHOULDER JOINT REPLACEMENT SURGERY: Primary | ICD-10-CM

## 2023-10-09 PROCEDURE — 97110 THERAPEUTIC EXERCISES: CPT

## 2023-10-09 PROCEDURE — 97112 NEUROMUSCULAR REEDUCATION: CPT

## 2023-10-09 NOTE — TELEPHONE ENCOUNTER
Caller: Isael    Doctor/Office: Mushtaq/Nando HERNANDEZ#: [phone]    Escalation: Appointment Patient missed his appt on 9/26 stated he was f/u for a ulcer  There are not appt until November pt feels he should be soon .   Please advise Thank you

## 2023-10-09 NOTE — PROGRESS NOTES
Daily Note     Today's date: 10/9/2023  Patient name: Carrie Epstein  : 1955  MRN: [id number]  Referring provider: Ke Alcantar PA-C  Dx:   Encounter Diagnosis     ICD-10-CM    1. Aftercare following left shoulder joint replacement surgery  Z47.1     E26.277                      Subjective: Patient reports his shoulder remains feeling good outside of sling. Objective: See treatment diary below       Assessment: Tolerated treatment well with good and pain-free AAROM noted today with supine exercises. Progressing well through protocol at this time. Patient exhibited good technique with therapeutic exercises and would benefit from continued PT. Plan: Continue per plan of care. Progress treament per protocol. Precautions: see protocol   Past Medical History:   Diagnosis Date   • Arrhythmia    • Chronic kidney disease    • COVID 2020   • CPAP (continuous positive airway pressure) dependence    • Diabetes mellitus ([address])    • History of echocardiogram 2017    showed EF of 50-55 percentWith moderate LVH and left ventricle diastolic dysfunction. Left atrium was moderately enlarged. Trace MR noted. • History of Holter monitoring 2017    showed baseline rhythm of sinus origin with an average heart it of 61 bpm. The lowest heart rate was 49 and the highest heart rate was 10 8 bpm. There were rare single VPCs, and frequent PACs representing 3.2% of total beats. There were several episodes of sinus arrhythmias with sinus bradycardia and heart rate ranging from 40-90 bpm. No sustained dysrhythmias, or pauses noted. The patient did not   • History of transfusion 2023    platelets   • Liver disease     cirhosis   • Obese    • Sleep apnea        SOC: 2023  FOTO: 10/2/23  POC Expiration: 2023  Daily Treatment Log:  Date 10/5/23  `10/9/23      Visit # 6 7      Manual 5' 5'      Gentle PROM no ext or ER  LL        There Exer 30' 30'      Pendulums  x20 cw/ccw  x20 ea.  Cw/ccw, R/L and fwd/bck       Supine flex w/ OP UE  2x10 5"  2x10 5"       Std cane abd AAROM 2x10  2x10       Elbow flexion and extension  2x15  2x15       Supination and pronation  W/ hammer 2x15 std W/ hammer 2x15 std      Gripper  35# 2x10  35# 2x10       Therabar twists  Red        Therabar bends Red 2x10 ea  Red 2x10 ea       Standing B/L ER                                 HEP        There Activ                                                        NMReed 10' 10'      Scap depr/elev 2x10 std  2x10 std       Scap retract  2x10 std  2x10 std       Scap circles retro 2x10  2x10       Deltoid iso's 2x10 5"  2x10 5"               Modalities                                HEP:   Access Code: [email or web address]  URL: [email or web address]  Date: 09/18/2023  Prepared by: Clotilda Moritz    Exercises  - Wrist Extension AROM  - 1-2 x daily - 7 x weekly - 1-2 sets - 10 reps  - Wrist Flexion AROM  - 1-2 x daily - 7 x weekly - 1-2 sets - 10 reps  - Wrist Radial Deviation AROM  - 1-2 x daily - 7 x weekly - 1-2 sets - 10 reps  - Seated Forearm Pronation and Supination AROM  - 1-2 x daily - 7 x weekly - 1-2 sets - 10 reps  - Seated Elbow Flexion and Extension AROM  - 1-2 x daily - 7 x weekly - 1-2 sets - 10 reps

## 2023-10-10 ENCOUNTER — TELEPHONE (OUTPATIENT)
Dept: PODIATRY | Facility: CLINIC | Age: 68
End: 2023-10-10

## 2023-10-10 ENCOUNTER — OFFICE VISIT (OUTPATIENT)
Dept: PODIATRY | Facility: CLINIC | Age: 68
End: 2023-10-10
Payer: MEDICARE

## 2023-10-10 VITALS
BODY MASS INDEX: 34.31 KG/M2 | WEIGHT: 245.1 LBS | HEART RATE: 77 BPM | SYSTOLIC BLOOD PRESSURE: 160 MMHG | DIASTOLIC BLOOD PRESSURE: 74 MMHG | HEIGHT: 71 IN

## 2023-10-10 DIAGNOSIS — Z79.4 TYPE 2 DIABETES MELLITUS WITH DIABETIC NEUROPATHY, WITH LONG-TERM CURRENT USE OF INSULIN (HCC): ICD-10-CM

## 2023-10-10 DIAGNOSIS — E11.40 TYPE 2 DIABETES MELLITUS WITH DIABETIC NEUROPATHY, WITH LONG-TERM CURRENT USE OF INSULIN (HCC): ICD-10-CM

## 2023-10-10 DIAGNOSIS — L97.522 ULCER OF LEFT FOOT WITH FAT LAYER EXPOSED (HCC): Primary | ICD-10-CM

## 2023-10-10 PROCEDURE — 99213 OFFICE O/P EST LOW 20 MIN: CPT | Performed by: PODIATRIST

## 2023-10-10 NOTE — PROGRESS NOTES
Patient ID: Tai Duran is a 77 y.o. male Date of Birth 1955       Assessment:    No problem-specific Assessment & Plan notes found for this encounter. Diagnoses and all orders for this visit:    Ulcer of left foot with fat layer exposed ([address])    Type 2 diabetes mellitus with diabetic neuropathy, with long-term current use of insulin ([address])            Procedures    Plan:  1. Reviewed medical records. Reviewed previous note. 2. Wound looks healed now. Instructed protective pads. Instructed skin care and protection. 3. Patient was counseled on the condition and diagnosis. Educated disease prevention and risks related to diabetes. 4. Awaits new DM shoes. 5. RA in 6 weeks. Subjective:      . RYAN Guzman presents for evaluation of left foot. Wound looks healed. No drainage. No pain. BS under control. No new complaint. The following portions of the patient's history were reviewed and updated as appropriate: allergies, current medications, past family history, past medical history, past social history, past surgical history and problem list.    Review of Systems   Constitutional: Negative for chills and fever. Respiratory: Negative for shortness of breath. Cardiovascular: Negative for chest pain. Gastrointestinal: Negative for diarrhea, nausea and vomiting. Musculoskeletal: Negative for gait problem. Neurological: Positive for numbness. Objective:            /74   Pulse 77   Ht 5' 11" (1.803 m)   Wt 111 kg (245 lb 1.6 oz)   BMI 34.18 kg/m²     Physical Exam  Vitals reviewed. Constitutional:       General: He is not in acute distress. Appearance: Normal appearance. He is not ill-appearing or toxic-appearing. Cardiovascular:      Rate and Rhythm: Normal rate and regular rhythm. Pulses: Normal pulses. Pulmonary:      Effort: Pulmonary effort is normal. No respiratory distress. Musculoskeletal:         General: Deformity present.  No tenderness or signs of injury. Skin:     General: Skin is warm. Coloration: Skin is not cyanotic or mottled. Findings: No abscess, erythema or rash. Nails: There is no clubbing. Comments: No keratosis or wound left submet 2 area. No dry blood. No cellulitis. Neurological:      General: No focal deficit present. Mental Status: He is alert and oriented to person, place, and time. Cranial Nerves: No cranial nerve deficit. Sensory: Sensory deficit present. Coordination: Coordination normal.   Psychiatric:         Mood and Affect: Mood normal.         Behavior: Behavior normal.         Thought Content:  Thought content normal.         Judgment: Judgment normal.

## 2023-10-10 NOTE — TELEPHONE ENCOUNTER
Caller: Bob Martinez    Doctor/Office: Jeanette Meneses DPM    #: [phone]    Escalation:  Isael missed a call from the office and I went into his chart to see who called him and why.

## 2023-10-12 ENCOUNTER — OFFICE VISIT (OUTPATIENT)
Dept: PHYSICAL THERAPY | Facility: CLINIC | Age: 68
End: 2023-10-12
Payer: MEDICARE

## 2023-10-12 DIAGNOSIS — Z47.1 AFTERCARE FOLLOWING LEFT SHOULDER JOINT REPLACEMENT SURGERY: Primary | ICD-10-CM

## 2023-10-12 DIAGNOSIS — Z96.612 AFTERCARE FOLLOWING LEFT SHOULDER JOINT REPLACEMENT SURGERY: Primary | ICD-10-CM

## 2023-10-12 PROCEDURE — 97112 NEUROMUSCULAR REEDUCATION: CPT

## 2023-10-12 PROCEDURE — 97110 THERAPEUTIC EXERCISES: CPT

## 2023-10-12 NOTE — PROGRESS NOTES
Daily Note     Today's date: 10/12/2023  Patient name: Ashish Stephen  : 1955  MRN: [id number]  Referring provider: Dalia Gannon PA-C  Dx:   Encounter Diagnosis     ICD-10-CM    1. Aftercare following left shoulder joint replacement surgery  Z47.1     Z96.612             Start Time: 1015  Stop Time: 1055  Total time in clinic (min): 40 minutes    Subjective: Patient maintains that his L his shoulder remains feeling good outside of sling, and he is returning to his DO on 10/18 for a check-in. Objective: See treatment diary below    Treatment and documentation completed by Ama Weiner SPT, under direct supervision and review of documentation completed by Yakelin Trammell DPT. Assessment: Tolerated treatment well with good and pain-free AAROM noted today with supine exercises. Progressing well through protocol at this time. Pt performed bl/ shoulder ER for the first time and felt only mild tightness to begin the intervention, and had no pain afterwards. Patient exhibited good technique with therapeutic exercises and would benefit from continued PT. Plan: Continue per plan of care. Progress treament per protocol. Precautions: see protocol   Past Medical History:   Diagnosis Date    Arrhythmia     Chronic kidney disease     COVID 2020    CPAP (continuous positive airway pressure) dependence     Diabetes mellitus ([address])     History of echocardiogram 2017    showed EF of 50-55 percentWith moderate LVH and left ventricle diastolic dysfunction. Left atrium was moderately enlarged. Trace MR noted. History of Holter monitoring 2017    showed baseline rhythm of sinus origin with an average heart it of 61 bpm. The lowest heart rate was 49 and the highest heart rate was 10 8 bpm. There were rare single VPCs, and frequent PACs representing 3.2% of total beats.  There were several episodes of sinus arrhythmias with sinus bradycardia and heart rate ranging from 40-90 bpm. No sustained dysrhythmias, or pauses noted. The patient did not    History of transfusion 04/2023    platelets    Liver disease     cirhosis    Obese     Sleep apnea        SOC: 9/18/2023  FOTO: 10/2/23  POC Expiration: 12/11/2023  Daily Treatment Log:  Date 10/5/23  `10/9/23 10/12/23     Visit # 6 7 8     Manual 5' 5'      Gentle PROM no ext or ER  LL        There Exer 30' 30' 30'     Pendulums  x20 cw/ccw  x20 ea. Cw/ccw, R/L and fwd/bck  x20 ea. Cw/ccw, R/L and fwd/bck      Supine flex w/ OP UE  2x10 5"  2x10 5"  2x10 5"      Std cane abd AAROM 2x10  2x10  2x10      Elbow flexion and extension  2x15  2x15  2x15      Supination and pronation  W/ hammer 2x15 std W/ hammer 2x15 std W/ hammer 2x15 std     Gripper  35# 2x10  35# 2x10  35# 2x10      Therabar twists  Red   Red     Therabar bends Red 2x10 ea  Red 2x10 ea  Red 2x10 ea     Standing B/L ER    2x10, no band                             HEP        There Activ                                                        NMReed 10' 10' 10'     Scap depr/elev 2x10 std  2x10 std  2x10 std      Scap retract  2x10 std  2x10 std  2x10 std      Scap circles retro 2x10  2x10  2x10      Deltoid iso's 2x10 5"  2x10 5"  2x10 5"              Modalities                                HEP:   Access Code: [email or web address]  URL: [email or web address]  Date: 09/18/2023  Prepared by: Jaida Cordero    Exercises  - Wrist Extension AROM  - 1-2 x daily - 7 x weekly - 1-2 sets - 10 reps  - Wrist Flexion AROM  - 1-2 x daily - 7 x weekly - 1-2 sets - 10 reps  - Wrist Radial Deviation AROM  - 1-2 x daily - 7 x weekly - 1-2 sets - 10 reps  - Seated Forearm Pronation and Supination AROM  - 1-2 x daily - 7 x weekly - 1-2 sets - 10 reps  - Seated Elbow Flexion and Extension AROM  - 1-2 x daily - 7 x weekly - 1-2 sets - 10 reps

## 2023-10-16 ENCOUNTER — EVALUATION (OUTPATIENT)
Dept: PHYSICAL THERAPY | Facility: CLINIC | Age: 68
End: 2023-10-16
Payer: MEDICARE

## 2023-10-16 DIAGNOSIS — Z47.1 AFTERCARE FOLLOWING LEFT SHOULDER JOINT REPLACEMENT SURGERY: Primary | ICD-10-CM

## 2023-10-16 DIAGNOSIS — Z96.612 AFTERCARE FOLLOWING LEFT SHOULDER JOINT REPLACEMENT SURGERY: Primary | ICD-10-CM

## 2023-10-16 PROCEDURE — 97112 NEUROMUSCULAR REEDUCATION: CPT

## 2023-10-16 PROCEDURE — 97110 THERAPEUTIC EXERCISES: CPT

## 2023-10-16 NOTE — PROGRESS NOTES
PT Re-Evaluation     Today's date: 10/16/2023  Patient name: Nida Giles  : 1955  MRN: [id number]  Referring provider: Fallon Younger PA-C  Dx:   Encounter Diagnosis     ICD-10-CM    1. Aftercare following left shoulder joint replacement surgery  Z47.1     Z96.612                      Assessment  Assessment details: Nida Giles is a 69 y.o. male who presents for re-eval ost op revision of reverse TSA performed on 2023 with improvements in pain, shoulder ROM, joint effusion, and posture, however deficits and limitations per protocol still present  Due to these impairments, patient has difficulty performing ADL's, recreational activities, engaging in social activities, lifting/carrying, transfers, reaching. Patient has been educated in post-op protocol, home exercise program and plan of care.  Patient would benefit from skilled physical therapy services to address their aforementioned functional limitations and progress per MD protocol towards prior level of function and independence with home exercise program.   Impairments: abnormal or restricted ROM, abnormal movement, activity intolerance, impaired physical strength, lacks appropriate home exercise program, pain with function, poor posture  and poor body mechanics    Goals  Short Term Goals to be accomplished in 4 weeks:  STG1: Pt will be I with HEP to maximize progress between therapy sessions ---MET  STG2: Pt will be I with posture management --MET  STG3: Pt will demo shoulder flexion and abduction PROM of at least 90 deg to progress towards AAROM and AROM ---MET  STG4: Pt will demo 1/2 MMT strength in shoulder ---unable to test at this time per protocol   STG5: Pt will report pain 2/10 in shoulder or less at worst --MET     Long Term Goals to be accomplished in 12 weeks: Progressing towards  LTG1: Pt will demo full cervical AROM to prevent impingement in L shoulder  LTG2: Pt will return to work/household ADLs 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 LTG3: Pt will demo shoulder strength WNL to allow lifting/carrying. LTG4: Pt will demo at least 120 deg of shoulder AROM to improve self care (showering) and household ADLs (opening car door). Plan  Plan details: HEP development, stretching, strengthening, A/AA/PROM, joint mobilizations, posture education, STM/MI as needed to reduce muscle tension, muscle reeducation, PLOC discussed and agreed upon with patient. Patient would benefit from: skilled physical therapy  Planned modality interventions: cryotherapy and thermotherapy: hydrocollator packs  Planned therapy interventions: home exercise program, therapeutic exercise, therapeutic activities, self care, patient education, manual therapy, neuromuscular re-education, IASTM, joint mobilization and postural training  Frequency: 2x week  Plan of Care beginning date: 9/18/2023  Plan of Care expiration date: 12/11/2023  Treatment plan discussed with: patient        Subjective Evaluation    History of Present Illness  Date of surgery: 9/8/2023  Mechanism of injury: Patient returns to PT for re-eval post op Reverse TSA revision following hospitalization from infection in original Reverse TSA performed on April 4th, 2023. Patient reports he has no pain with progression of protocol and is following instructions to not lift of perform AROM at this time. Patient to follow up with MD on 10/18/23 and is set to progress to next phase at the start of November.    Patient Goals  Patient goals for therapy: decreased pain, increased motion, increased strength, independence with ADLs/IADLs and return to sport/leisure activities    Pain  No pain reported  Progression: improved (progressing well through protocol with no pain reported at this time w/ AAROM)    Social Support  Lives with: spouse    Hand dominance: right      Diagnostic Tests  X-ray: normal (hardware in place)  Treatments  Previous treatment: physical therapy        Objective    Objective: A/PROM:    R   L  Shoulder flexion (supine) 165A   140AA  Shoulder abduction  170   125AA  Shoulder [email or web address]  Mod restrepo  Severe restrepo in B/L ER in sitting  Shoulder IR   TBA   TBA      STRENGTH:    R   L    Shoulder flexion  5/5   NT/5  Shoulder abduction  5/5   NT/5  Shoulder [email or web address]  5/5   NT/5  Shoulder IR   5/5   NT/5    Posture: Pt's sitting posture is WNL     Cervical Screen: cervical AROM limitations  Flexion  WNL  Extension Min restrepo  R rotation WNL  L rotation WNL  R sidebend WNL  L sidebend WNL           Precautions: see protocol   Past Medical History:   Diagnosis Date    Arrhythmia     Chronic kidney disease     COVID 12/2020    CPAP (continuous positive airway pressure) dependence     Diabetes mellitus ([address])     History of echocardiogram 11/14/2017    showed EF of 50-55 percentWith moderate LVH and left ventricle diastolic dysfunction. Left atrium was moderately enlarged. Trace MR noted. History of Holter monitoring 11/21/2017    showed baseline rhythm of sinus origin with an average heart it of 61 bpm. The lowest heart rate was 49 and the highest heart rate was 10 8 bpm. There were rare single VPCs, and frequent PACs representing 3.2% of total beats. There were several episodes of sinus arrhythmias with sinus bradycardia and heart rate ranging from 40-90 bpm. No sustained dysrhythmias, or pauses noted. The patient did not    History of transfusion 04/2023    platelets    Liver disease     cirhosis    Obese     Sleep apnea    SOC: 9/18/2023  FOTO: 10/2/23  POC Expiration: 12/11/2023  Daily Treatment Log:  Date 10/5/23  `10/9/23 10/12/23 10/16/2023    Visit # 6 7 8 9 (RE/FOTO)     Manual 5' 5'      Gentle PROM no ext or ER  LL        There Exer 30' 30' 30' 30'     Pulley    3'     Pendulums  x20 cw/ccw  x20 ea. Cw/ccw, R/L and fwd/bck  x20 ea.  Cw/ccw, R/L and fwd/bck      Supine flex w/ OP UE  2x10 5"  2x10 5"  2x10 5"  2x10 5"     Std cane abd AAROM 2x10  2x10  2x10  2x10    Elbow flexion and extension 2x15  2x15  2x15  2x15    Supination and pronation  W/ hammer 2x15 std W/ hammer 2x15 std W/ hammer 2x15 std W/ hammer 2x15 std    Gripper  35# 2x10  35# 2x10  35# 2x10  35# 2x10     Therabar twists  Red   Red     Therabar bends Red 2x10 ea  Red 2x10 ea  Red 2x10 ea Red 2x10 ea    Standing B/L ER    2x10, no band 2x10, no band                    Objective measures     EG    HEP        There Activ                                                        NMReed 10' 10' 10' 10'    Scap depr/elev 2x10 std  2x10 std  2x10 std  2x10 std     Scap retract  2x10 std  2x10 std  2x10 std  2x10 std     Scap circles retro 2x10  2x10  2x10  2x10     Deltoid iso's 2x10 5"  2x10 5"  2x10 5"  2x10 5"             Modalities                                HEP:   Access Code: [email or web address]  URL: [email or web address]  Date: 09/18/2023  Prepared by: Alessandra Lindsay  - Wrist Extension AROM  - 1-2 x daily - 7 x weekly - 1-2 sets - 10 reps  - Wrist Flexion AROM  - 1-2 x daily - 7 x weekly - 1-2 sets - 10 reps  - Wrist Radial Deviation AROM  - 1-2 x daily - 7 x weekly - 1-2 sets - 10 reps  - Seated Forearm Pronation and Supination AROM  - 1-2 x daily - 7 x weekly - 1-2 sets - 10 reps  - Seated Elbow Flexion and Extension AROM  - 1-2 x daily - 7 x weekly - 1-2 sets - 10 reps

## 2023-10-17 ENCOUNTER — TELEPHONE (OUTPATIENT)
Age: 68
End: 2023-10-17

## 2023-10-17 NOTE — TELEPHONE ENCOUNTER
Caller: [facility]    Doctor: Dr. Dano Kraft    Reason for call: asking if dr signed LMN they faxed over-nothing in chart-will fax again.      Call back#: [phone]

## 2023-10-18 ENCOUNTER — OFFICE VISIT (OUTPATIENT)
Dept: OBGYN CLINIC | Facility: CLINIC | Age: 68
End: 2023-10-18

## 2023-10-18 ENCOUNTER — HOSPITAL ENCOUNTER (OUTPATIENT)
Dept: RADIOLOGY | Facility: HOSPITAL | Age: 68
Discharge: HOME | End: 2023-10-18
Attending: ORTHOPAEDIC SURGERY
Payer: MEDICARE

## 2023-10-18 VITALS
SYSTOLIC BLOOD PRESSURE: 171 MMHG | BODY MASS INDEX: 34.3 KG/M2 | WEIGHT: 245 LBS | HEART RATE: 83 BPM | HEIGHT: 71 IN | DIASTOLIC BLOOD PRESSURE: 83 MMHG

## 2023-10-18 DIAGNOSIS — Z47.1 AFTERCARE FOLLOWING LEFT SHOULDER JOINT REPLACEMENT SURGERY: Primary | ICD-10-CM

## 2023-10-18 DIAGNOSIS — Z47.1 AFTERCARE FOLLOWING LEFT SHOULDER JOINT REPLACEMENT SURGERY: ICD-10-CM

## 2023-10-18 DIAGNOSIS — Z96.612 AFTERCARE FOLLOWING LEFT SHOULDER JOINT REPLACEMENT SURGERY: Primary | ICD-10-CM

## 2023-10-18 DIAGNOSIS — Z96.612 AFTERCARE FOLLOWING LEFT SHOULDER JOINT REPLACEMENT SURGERY: ICD-10-CM

## 2023-10-18 PROCEDURE — 73030 X-RAY EXAM OF SHOULDER: CPT

## 2023-10-18 PROCEDURE — 99024 POSTOP FOLLOW-UP VISIT: CPT | Performed by: ORTHOPAEDIC SURGERY

## 2023-10-18 NOTE — PROGRESS NOTES
[address]  [address]  [facility] [address]  [phone]       Sridevi Cheatham  [id number]  1955    ORTHOPAEDIC SURGERY OUTPATIENT NOTE  10/18/2023      HISTORY:  77 y.o. male presents for follow-up status post left removal of antibiotic spacer revision reverse total shoulder arthroplasty on 9/8/2023. He is doing well. Pain is controlled. He denies fever or chills. He is working with physical therapy. Past Medical History:   Diagnosis Date    Arrhythmia     Chronic kidney disease     COVID 12/2020    CPAP (continuous positive airway pressure) dependence     Diabetes mellitus ([address])     History of echocardiogram 11/14/2017    showed EF of 50-55 percentWith moderate LVH and left ventricle diastolic dysfunction. Left atrium was moderately enlarged. Trace MR noted. History of Holter monitoring 11/21/2017    showed baseline rhythm of sinus origin with an average heart it of 61 bpm. The lowest heart rate was 49 and the highest heart rate was 10 8 bpm. There were rare single VPCs, and frequent PACs representing 3.2% of total beats. There were several episodes of sinus arrhythmias with sinus bradycardia and heart rate ranging from 40-90 bpm. No sustained dysrhythmias, or pauses noted.  The patient did not    History of transfusion 04/2023    platelets    Liver disease     cirhosis    Obese     Sleep apnea        Past Surgical History:   Procedure Laterality Date    CATARACT EXTRACTION      EYE SURGERY Left 1997    FL GUIDED NEEDLE PLAC BX/ASP/INJ  8/28/2023    HERNIA REPAIR  [id number]    JOINT REPLACEMENT Right     TKR    KNEE ARTHROPLASTY Right 2008    MT ARTHROPLASTY GLENOHUMERAL JOINT TOTAL SHOULDER Left 04/04/2023    Procedure: ARTHROPLASTY SHOULDER REVERSE;  Surgeon: Gibson Rodriguez MD;  Location: [facility];  Service: Orthopedics    MT JARED SHOULDER ARTHRPLSTY HUMERAL&GLENOID COMPNT Left 9/8/2023    Procedure: removal of antibiotic spacer, incision and debridement,  with revision reverse shoulder arthroplasty;  Surgeon: Vangie Gerardo;  Location: [facility];  Service: Orthopedics    HI JARED SHOULDER ARTHRPLSTY HUMERAL/GLENOID COMPNT Left 2023    Procedure: ARTHROPLASTY SHOULDER REVISION;  Surgeon: Vangie Gerardo;  Location: [facility];  Service: Orthopedics    SHOULDER SURGERY Right 2002    WOUND DEBRIDEMENT Left 2023    Procedure: INCISION AND DRAINAGE (I&D) EXTREMITY, vac placement;  Surgeon: Davis Donohue MD;  Location: [facility];  Service: Orthopedics       Social History     Socioeconomic History    Marital status: /Civil Union     Spouse name: Not on file    Number of children: 2    Years of education: 16    Highest education level: Some college, no degree   Occupational History    Not on file   Tobacco Use    Smoking status: Former     Packs/day: 0.50     Years: 20.00     Total pack years: 10.00     Types: Cigarettes     Quit date:      Years since quittin.8    Smokeless tobacco: Never   Vaping Use    Vaping Use: Never used   Substance and Sexual Activity    Alcohol use: Not Currently     Comment: quit     Drug use: Never    Sexual activity: Not Currently   Other Topics Concern    Not on file   Social History Narrative    · Most recent tobacco use screenin2018      · Do you currently or have you served in the [address] [place] [address] Street:   No      · Live alone or with others:   with others      · High blood pressure: Yes      · Exercise level:   Occasional      · Overweight:   Yes      · Obese:    Yes      · Diabetes:   Yes      Social Determinants of Health     Financial Resource Strain: Not on file   Food Insecurity: No Food Insecurity (2023)    Hunger Vital Sign     Worried About Running Out of Food in the Last Year: Never true     Ran Out of Food in the Last Year: Never true   Transportation Needs: No Transportation Needs (2023)    PRAPARE - Transportation Lack of Transportation (Medical): No     Lack of Transportation (Non-Medical): No   Physical Activity: Not on file   Stress: Not on file   Social Connections: Not on file   Intimate Partner Violence: Not on file   Housing Stability: Low Risk  (9/8/2023)    Housing Stability Vital Sign     Unable to Pay for Housing in the Last Year: No     Number of Places Lived in the Last Year: 1     Unstable Housing in the Last Year: No       Family History   Problem Relation Age of Onset    Diabetes Mother     Supraventricular tachycardia Mother     COPD Father     Heart disease Father     Other Father         Sepsis; related to UTI with complicating cardiac problems    Heart disease Paternal Grandmother         Patient's Medications   New Prescriptions    No medications on file   Previous Medications    BENZONATATE (TESSALON) 200 MG CAPSULE    TAKE 1 CAPSULE BY MOUTH 3 TIMES DAILY AS NEEDED for cough    FUROSEMIDE (LASIX) 20 MG TABLET    Take 1 tablet (20 mg total) by mouth every other day    GLIMEPIRIDE (AMARYL) 4 MG TABLET    Take 4 mg by mouth 2 (two) times a day    INSULIN PEN NEEDLE (BD PEN NEEDLE TERRA 2ND GEN) 32G X 4 MM MISC    For use with insulin pen. Pharmacy may dispense brand covered by insurance.     LANTUS SOLOSTAR 100 UNITS/ML SOPN    Inject 40 Units under the skin daily at bedtime    MULTIPLE VITAMIN (MULTIVITAMIN) CAPSULE    Take 1 capsule by mouth daily    TAMSULOSIN (FLOMAX) 0.4 MG    Take 1 capsule (0.4 mg total) by mouth daily with dinner    TRAZODONE (DESYREL) 50 MG TABLET    Take 50 mg by mouth daily at bedtime bedtime   Modified Medications    No medications on file   Discontinued Medications    No medications on file       Allergies   Allergen Reactions    Sulfa Antibiotics Hives    Daptomycin Other (See Comments)     Possibly contributed to ABILIO on 6/2023 admission         BP (!) 171/83 (BP Location: Right arm, Patient Position: Sitting, Cuff Size: Standard)   Pulse 83   Ht 5' 11" (1.803 m)   Wt 111 kg (245 lb)   BMI 34.17 kg/m²      REVIEW OF SYSTEMS:  Constitutional: Negative. HEENT: Negative. Respiratory: Negative. Skin: Negative. Neurological: Negative. Psychiatric/Behavioral: Negative. Musculoskeletal: Negative except for that mentioned in the HPI.    BP (!) 171/83 (BP Location: Right arm, Patient Position: Sitting, Cuff Size: Standard)   Pulse 83   Ht 5' 11" (1.803 m)   Wt 111 kg (245 lb)   BMI 34.17 kg/m²   Gen: No acute distress, resting comfortably in bed  HEENT: Eyes clear, moist mucus membranes, hearing intact  Respiratory: No audible wheezing or stridor  Cardiovascular: Well Perfused peripherally, 2+ distal pulse  Abdomen: nondistended, no peritoneal signs     PHYSICAL EXAM:    Left shoulder  Incision is well-healed      ER 10  IR sacrum  Abduct 80    Strength not evaluated    Sensation intact  Brisk CR    IMAGING: X-rays of the left shoulder taken the office today. These were reviewed and demonstrate intact reverse total shoulder arthroplasty in stable position without signs of loosening or failure    ASSESSMENT AND PLAN:  77 y.o. male status post left reverse shoulder revision with removal of antibiotic spacer and debridement on 9/8/2023. He is 6 weeks out and doing very well. He will progress with therapy and follow the protocol. We will see him back in 6 weeks when he is 3 months out with repeat x-ray.     Scribe Attestation      I,:  Jan Redding PA-C am acting as a scribe while in the presence of the attending physician.:       I,:  Lucien Suarez personally performed the services described in this documentation    as scribed in my presence.:

## 2023-10-19 ENCOUNTER — OFFICE VISIT (OUTPATIENT)
Dept: PHYSICAL THERAPY | Facility: CLINIC | Age: 68
End: 2023-10-19
Payer: MEDICARE

## 2023-10-19 DIAGNOSIS — Z96.612 AFTERCARE FOLLOWING LEFT SHOULDER JOINT REPLACEMENT SURGERY: Primary | ICD-10-CM

## 2023-10-19 DIAGNOSIS — Z47.1 AFTERCARE FOLLOWING LEFT SHOULDER JOINT REPLACEMENT SURGERY: Primary | ICD-10-CM

## 2023-10-19 PROCEDURE — 97112 NEUROMUSCULAR REEDUCATION: CPT

## 2023-10-19 PROCEDURE — 97110 THERAPEUTIC EXERCISES: CPT

## 2023-10-19 NOTE — PROGRESS NOTES
Daily Note     Today's date: 10/19/2023  Patient name: Jaymie Crockett  : 1955  MRN: [id number]  Referring provider: Jan Redding PA-C  Dx:   Encounter Diagnosis     ICD-10-CM    1. Aftercare following left shoulder joint replacement surgery  Z47.1     P96.921                      Subjective: Patient reports his appt with the MD went well and they are pleased with progress thus far. Patient had xray performed and feels a bit stiff today after having to hold his arm in the necessary position for xrays until they were completed. Patient states MD would like to progress protocol at this time to tolerance with supine AROM and isometrics. Objective: See treatment diary below      Assessment: Tolerated treatment well with addition of AROM in supine and S/L with no pain reported, however some limitations in strength noted. Patient demonstrated fatigue post treatment, exhibited good technique with therapeutic exercises, and would benefit from continued PT      Plan: Continue per plan of care. And progress treatment to tolerance with in MD protocol       Precautions: see protocol   Past Medical History:   Diagnosis Date    Arrhythmia     Chronic kidney disease     COVID 2020    CPAP (continuous positive airway pressure) dependence     Diabetes mellitus ([address])     History of echocardiogram 2017    showed EF of 50-55 percentWith moderate LVH and left ventricle diastolic dysfunction. Left atrium was moderately enlarged. Trace MR noted. History of Holter monitoring 2017    showed baseline rhythm of sinus origin with an average heart it of 61 bpm. The lowest heart rate was 49 and the highest heart rate was 10 8 bpm. There were rare single VPCs, and frequent PACs representing 3.2% of total beats. There were several episodes of sinus arrhythmias with sinus bradycardia and heart rate ranging from 40-90 bpm. No sustained dysrhythmias, or pauses noted.  The patient did not    History of transfusion 04/2023    platelets    Liver disease     cirhosis    Obese     Sleep apnea    SOC: 9/18/2023  FOTO: 10/2/23  POC Expiration: 12/11/2023  Daily Treatment Log:  Date 10/5/23  `10/9/23 10/12/23 10/16/2023 10/19/2023   Visit # 6 7 8 9 (RE/FOTO)  10   Manual 5' 5'      Gentle PROM no ext or ER  LL        There Exer 30' 30' 30' 30'  30'    Pulley    3'  5'    Pendulums  x20 cw/ccw  x20 ea. Cw/ccw, R/L and fwd/bck  x20 ea. Cw/ccw, R/L and fwd/bck      Supine AAROM flex w/ cane 2x10 5"  2x10 5"  2x10 5"  2x10 5"  2x10 5"    Std cane abd AAROM 2x10  2x10  2x10  2x10 2x10    Elbow flexion and extension  2x15  2x15  2x15  2x15    Supination and pronation  W/ hammer 2x15 std W/ hammer 2x15 std W/ hammer 2x15 std W/ hammer 2x15 std W/ hammer 2x10 w/ 1# on hammer    Gripper  35# 2x10  35# 2x10  35# 2x10  35# 2x10     Therabar twists  Red   Red     Therabar bends Red 2x10 ea  Red 2x10 ea  Red 2x10 ea Red 2x10 ea Red 2x10 ea. Standing B/L ER    2x10, no band 2x10, no band 2x10 no band    AROM flex in supine      1x10 L    AROM S/L abd      1x10 L                                    Objective measures     EG    HEP        There Activ                                                        NMReed 10' 10' 10' 10' 10'    Scap depr/elev 2x10 std  2x10 std  2x10 std  2x10 std  2x10 std    Scap retract  2x10 std  2x10 std  2x10 std  2x10 std  2x10 std    Scap circles retro 2x10  2x10  2x10  2x10  2x10 std    Deltoid iso's 2x10 5"  2x10 5"  2x10 5"  2x10 5"  Fwd, lat, ext iso w/ ball at wall                                    Modalities                                HEP:   Access Code: [email or web address]  URL: [email or web address]  Date: 09/18/2023  Prepared by: Chet Miles    Exercises  - Wrist Extension AROM  - 1-2 x daily - 7 x weekly - 1-2 sets - 10 reps  - Wrist Flexion AROM  - 1-2 x daily - 7 x weekly - 1-2 sets - 10 reps  - Wrist Radial Deviation AROM  - 1-2 x daily - 7 x weekly - 1-2 sets - 10 reps  - Seated Forearm Pronation and Supination AROM  - 1-2 x daily - 7 x weekly - 1-2 sets - 10 reps  - Seated Elbow Flexion and Extension AROM  - 1-2 x daily - 7 x weekly - 1-2 sets - 10 reps

## 2023-10-23 ENCOUNTER — HOSPITAL ENCOUNTER (OUTPATIENT)
Dept: MRI IMAGING | Facility: HOSPITAL | Age: 68
Discharge: HOME | End: 2023-10-23
Attending: INTERNAL MEDICINE
Payer: MEDICARE

## 2023-10-23 ENCOUNTER — OFFICE VISIT (OUTPATIENT)
Dept: PHYSICAL THERAPY | Facility: CLINIC | Age: 68
End: 2023-10-23
Payer: MEDICARE

## 2023-10-23 DIAGNOSIS — R77.2 ELEVATED AFP: ICD-10-CM

## 2023-10-23 DIAGNOSIS — Z47.1 AFTERCARE FOLLOWING LEFT SHOULDER JOINT REPLACEMENT SURGERY: Primary | ICD-10-CM

## 2023-10-23 DIAGNOSIS — Z96.612 AFTERCARE FOLLOWING LEFT SHOULDER JOINT REPLACEMENT SURGERY: Primary | ICD-10-CM

## 2023-10-23 DIAGNOSIS — K70.30 ALCOHOLIC CIRRHOSIS OF LIVER WITHOUT ASCITES (HCC): ICD-10-CM

## 2023-10-23 PROCEDURE — A9585 GADOBUTROL INJECTION: HCPCS | Performed by: INTERNAL MEDICINE

## 2023-10-23 PROCEDURE — 97110 THERAPEUTIC EXERCISES: CPT

## 2023-10-23 PROCEDURE — 97112 NEUROMUSCULAR REEDUCATION: CPT

## 2023-10-23 PROCEDURE — G1004 CDSM NDSC: HCPCS

## 2023-10-23 PROCEDURE — 74183 MRI ABD W/O CNTR FLWD CNTR: CPT

## 2023-10-23 RX ORDER — GADOBUTROL 604.72 MG/ML
11 INJECTION INTRAVENOUS
Status: COMPLETED | OUTPATIENT
Start: 2023-10-23 | End: 2023-10-23

## 2023-10-23 RX ADMIN — GADOBUTROL 11 ML: 604.72 INJECTION INTRAVENOUS at 16:07

## 2023-10-23 NOTE — PROGRESS NOTES
Daily Note     Today's date: 10/23/2023  Patient name: Oswald Faria  : 1955  MRN: [id number]  Referring provider: Socorro De Santiago PA-C  Dx:   Encounter Diagnosis     ICD-10-CM    1. Aftercare following left shoulder joint replacement surgery  Z47.1     Z96.612                      Subjective: Patient reports his shoulder is feeling great, no c/o at this time. Objective: See treatment diary below      Assessment: Tolerated treatment well and remains challenged w/ recent additions and progressions to protocol. Patient demonstrated fatigue post treatment, exhibited good technique with therapeutic exercises, and would benefit from continued PT      Plan: Continue per plan of care. And progress treatment to tolerance with in MD protocol       Precautions: see protocol   Past Medical History:   Diagnosis Date    Arrhythmia     Chronic kidney disease     COVID 2020    CPAP (continuous positive airway pressure) dependence     Diabetes mellitus ([address])     History of echocardiogram 2017    showed EF of 50-55 percentWith moderate LVH and left ventricle diastolic dysfunction. Left atrium was moderately enlarged. Trace MR noted. History of Holter monitoring 2017    showed baseline rhythm of sinus origin with an average heart it of 61 bpm. The lowest heart rate was 49 and the highest heart rate was 10 8 bpm. There were rare single VPCs, and frequent PACs representing 3.2% of total beats. There were several episodes of sinus arrhythmias with sinus bradycardia and heart rate ranging from 40-90 bpm. No sustained dysrhythmias, or pauses noted.  The patient did not    History of transfusion 2023    platelets    Liver disease     cirhosis    Obese     Sleep apnea    SOC: 2023  FOTO: 10/2/23  POC Expiration: 2023  Daily Treatment Log:  Date 10/23/23        Visit # 11       Manual                There Exer        Pulley 3'        Pendulums         Supine AAROM flex w/ cane 2x10        Std cane abd AAROM 2x10        Supination and pronation  20x hammer 1#        Standing B/L ER  2x10        AROM flex in supine  1x10        AROM S/L abd  1x10        Bicep curl   2# 2x10                                Objective measures         HEP Updated + provided       There Activ                                                        NMReed        Scap depr/elev        Scap rows tubing Grn tube 2x10        B shldr ext to hip w/ tube Red tube 2x10        Iso's ball vs wall Flex/abd/ext 2x10 5" ea                                       Modalities                                HEP:   Access Code: [email or web address]  URL: [email or web address]  Date: 10/23/2023  Prepared by: Wolf Crabtree    Exercises  - Seated Elbow Flexion and Extension AROM  - 1-2 x daily - 7 x weekly - 1-2 sets - 10 reps  - Standing Shoulder Abduction ROM with Dowel  - 1 x daily - 7 x weekly - 2 sets - 10 reps  - Shoulder External Rotation and Scapular Retraction  - 1 x daily - 7 x weekly - 2 sets - 10 reps  - Supine Shoulder Flexion Extension AAROM with Dowel  - 1 x daily - 7 x weekly - 2 sets - 10 reps  - Isometric Shoulder Flexion at Wall  - 1 x daily - 7 x weekly - 2 sets - 10 reps

## 2023-10-26 ENCOUNTER — OFFICE VISIT (OUTPATIENT)
Dept: PHYSICAL THERAPY | Facility: CLINIC | Age: 68
End: 2023-10-26
Payer: MEDICARE

## 2023-10-26 DIAGNOSIS — Z96.612 AFTERCARE FOLLOWING LEFT SHOULDER JOINT REPLACEMENT SURGERY: Primary | ICD-10-CM

## 2023-10-26 DIAGNOSIS — Z47.1 AFTERCARE FOLLOWING LEFT SHOULDER JOINT REPLACEMENT SURGERY: Primary | ICD-10-CM

## 2023-10-26 PROCEDURE — 97112 NEUROMUSCULAR REEDUCATION: CPT | Performed by: PHYSICAL THERAPIST

## 2023-10-26 PROCEDURE — 97110 THERAPEUTIC EXERCISES: CPT | Performed by: PHYSICAL THERAPIST

## 2023-10-26 NOTE — PROGRESS NOTES
Daily Note     Today's date: 10/26/2023  Patient name: Nida Giles  : 1955  MRN: [id number]  Referring provider: Fallon Younger PA-C  Dx:   Encounter Diagnosis     ICD-10-CM    1. Aftercare following left shoulder joint replacement surgery  Z47.1     Z96.612                      Subjective: Pt reports that he is feeling well and denies any pain this morning. Objective: See treatment diary below      Assessment: Tolerated treatment well. Patient demonstrated fatigue post treatment and demos gradual progress with strength, ROM, and decrease pain. Pt rubén continue to benefit from skilled physical therapy in order to maximize strength and improve quality of life. Plan: Continue per plan of care. Precautions: see protocol   Past Medical History:   Diagnosis Date    Arrhythmia     Chronic kidney disease     COVID 2020    CPAP (continuous positive airway pressure) dependence     Diabetes mellitus ([address])     History of echocardiogram 2017    showed EF of 50-55 percentWith moderate LVH and left ventricle diastolic dysfunction. Left atrium was moderately enlarged. Trace MR noted. History of Holter monitoring 2017    showed baseline rhythm of sinus origin with an average heart it of 61 bpm. The lowest heart rate was 49 and the highest heart rate was 10 8 bpm. There were rare single VPCs, and frequent PACs representing 3.2% of total beats. There were several episodes of sinus arrhythmias with sinus bradycardia and heart rate ranging from 40-90 bpm. No sustained dysrhythmias, or pauses noted.  The patient did not    History of transfusion 2023    platelets    Liver disease     cirhosis    Obese     Sleep apnea    SOC: 2023  FOTO: 10/2/23  POC Expiration: 2023  Daily Treatment Log:  Date 10/23/23  10/26/23      Visit # 11 12      Manual                There Exer        Pulley 3'  3'       Pendulums         Supine AAROM flex w/ cane 2x10  2 x 10       Std cane abd AAROM 2x10 2 x 10       Supination and pronation  20x hammer 1#  20x hammer 1#       Standing B/L ER  2x10  2 x 10       AROM flex in supine  1x10  1 x 10       AROM S/L abd  1x10  1 x 10       Bicep curl   2# 2x10  2# 2 x 10                               Objective measures         HEP Updated + provided       There Activ                                                        NMReed        Scap depr/elev        Scap rows tubing Grn tube 2x10  Grn tube 2 x 10       B shldr ext to hip w/ tube Red tube 2x10  Red tube 2 x 10       Iso's ball vs wall Flex/abd/ext 2x10 5" ea Flex/abd/ext 2 x 10 5' ea. Modalities                                HEP:   Access Code: [email or web address]  URL: [email or web address]  Date: 10/23/2023  Prepared by: Christine De Leon    Exercises  - Seated Elbow Flexion and Extension AROM  - 1-2 x daily - 7 x weekly - 1-2 sets - 10 reps  - Standing Shoulder Abduction ROM with Dowel  - 1 x daily - 7 x weekly - 2 sets - 10 reps  - Shoulder External Rotation and Scapular Retraction  - 1 x daily - 7 x weekly - 2 sets - 10 reps  - Supine Shoulder Flexion Extension AAROM with Dowel  - 1 x daily - 7 x weekly - 2 sets - 10 reps  - Isometric Shoulder Flexion at Wall  - 1 x daily - 7 x weekly - 2 sets - 10 reps

## 2023-10-30 ENCOUNTER — OFFICE VISIT (OUTPATIENT)
Dept: PHYSICAL THERAPY | Facility: CLINIC | Age: 68
End: 2023-10-30
Payer: MEDICARE

## 2023-10-30 DIAGNOSIS — Z47.1 AFTERCARE FOLLOWING LEFT SHOULDER JOINT REPLACEMENT SURGERY: Primary | ICD-10-CM

## 2023-10-30 DIAGNOSIS — Z96.612 AFTERCARE FOLLOWING LEFT SHOULDER JOINT REPLACEMENT SURGERY: Primary | ICD-10-CM

## 2023-10-30 PROCEDURE — 97112 NEUROMUSCULAR REEDUCATION: CPT

## 2023-10-30 PROCEDURE — 97110 THERAPEUTIC EXERCISES: CPT

## 2023-10-30 NOTE — PROGRESS NOTES
Daily Note     Today's date: 10/30/2023  Patient name: Stevenson Torres  : 1955  MRN: [id number]  Referring provider: Marcell De La Cruz PA-C  Dx:   Encounter Diagnosis     ICD-10-CM    1. Aftercare following left shoulder joint replacement surgery  Z47.1     Z96.612                      Subjective: Pt reports that his shoulder continues to improve. Notes it is easier to reach his computer desk but remains difficult to reach a cabinet/shelf over shoulder height. Notes driving w/ his L hand remains difficult as well. Objective: See treatment diary below      Assessment: Tolerated treatment well. Progressed exercises today to help improve strength within patients available range, difficulty and fatigue noted but no inc in pain. Some compensatory mechanics noted towards the end of his wall slides due to fatigue that improved with rest. Patient demonstrated fatigue post treatment, exhibited good technique with therapeutic exercises, and would benefit from continued PT      Plan: Continue per plan of care. Precautions: see protocol   Past Medical History:   Diagnosis Date    Arrhythmia     Chronic kidney disease     COVID 2020    CPAP (continuous positive airway pressure) dependence     Diabetes mellitus ([address])     History of echocardiogram 2017    showed EF of 50-55 percentWith moderate LVH and left ventricle diastolic dysfunction. Left atrium was moderately enlarged. Trace MR noted. History of Holter monitoring 2017    showed baseline rhythm of sinus origin with an average heart it of 61 bpm. The lowest heart rate was 49 and the highest heart rate was 10 8 bpm. There were rare single VPCs, and frequent PACs representing 3.2% of total beats. There were several episodes of sinus arrhythmias with sinus bradycardia and heart rate ranging from 40-90 bpm. No sustained dysrhythmias, or pauses noted.  The patient did not    History of transfusion 2023    platelets    Liver disease     cirhosis Obese     Sleep apnea    SOC: 9/18/2023  FOTO: 10/16/23  POC Expiration: 12/11/2023  Daily Treatment Log:  Date 10/23/23  10/26/23 10/30/23      Visit # 11 12 13     Manual                There Exer   30'     Pulley 3'  3'  5'      Pendulums         Supine AAROM flex w/ cane 2x10  2 x 10  10x5"      Std cane abd AAROM 2x10  2 x 10  2x10      Supination and pronation  20x hammer 1#  20x hammer 1#       Standing B/L ER  2x10  2 x 10  2x10      AROM flex in supine  1x10  1 x 10  1# 2x10      AROM S/L abd  1x10  1 x 10  1# 2x5      Bicep curl   2# 2x10  2# 2 x 10  2# 2x10      Wall slides    2x10      S/L ER   2x5              Objective measures         HEP Updated + provided       There Activ                                                        NMReed   8'     Scap depr/elev        Scap rows tubing Grn tube 2x10  Grn tube 2 x 10  Blue tube 2x10      B shldr ext to hip w/ tube Red tube 2x10  Red tube 2 x 10  Grn tube 2x10     Iso's ball vs wall Flex/abd/ext 2x10 5" ea Flex/abd/ext 2 x 10 5' ea. Modalities                                HEP:   Access Code: [email or web address]  URL: [email or web address]  Date: 10/23/2023  Prepared by: Sherren Billet    Exercises  - Seated Elbow Flexion and Extension AROM  - 1-2 x daily - 7 x weekly - 1-2 sets - 10 reps  - Standing Shoulder Abduction ROM with Dowel  - 1 x daily - 7 x weekly - 2 sets - 10 reps  - Shoulder External Rotation and Scapular Retraction  - 1 x daily - 7 x weekly - 2 sets - 10 reps  - Supine Shoulder Flexion Extension AAROM with Dowel  - 1 x daily - 7 x weekly - 2 sets - 10 reps  - Isometric Shoulder Flexion at Wall  - 1 x daily - 7 x weekly - 2 sets - 10 reps

## 2023-11-02 ENCOUNTER — OFFICE VISIT (OUTPATIENT)
Dept: PHYSICAL THERAPY | Facility: CLINIC | Age: 68
End: 2023-11-02
Payer: MEDICARE

## 2023-11-02 DIAGNOSIS — Z47.1 AFTERCARE FOLLOWING LEFT SHOULDER JOINT REPLACEMENT SURGERY: Primary | ICD-10-CM

## 2023-11-02 DIAGNOSIS — Z96.612 AFTERCARE FOLLOWING LEFT SHOULDER JOINT REPLACEMENT SURGERY: Primary | ICD-10-CM

## 2023-11-02 PROCEDURE — 97110 THERAPEUTIC EXERCISES: CPT

## 2023-11-02 PROCEDURE — 97112 NEUROMUSCULAR REEDUCATION: CPT

## 2023-11-02 NOTE — PROGRESS NOTES
Daily Note     Today's date: 2023  Patient name: Sridevi Cheatham  : 1955  MRN: [id number]  Referring provider: Mallory Sanchez PA-C  Dx:   Encounter Diagnosis     ICD-10-CM    1. Aftercare following left shoulder joint replacement surgery  Z47.1     Z96.612                      Subjective: Pt reports his shoulder continue to feel better with no inc in pain after progressed exercises last session. Objective: See treatment diary below      Assessment: Tolerated treatment well and he continues to progress well. Noted difficulty w/ wall slides with fatigue but no production of pain. Patient demonstrated fatigue post treatment, exhibited good technique with therapeutic exercises, and would benefit from continued PT      Plan: Continue per plan of care. Precautions: see protocol   Past Medical History:   Diagnosis Date    Arrhythmia     Chronic kidney disease     COVID 2020    CPAP (continuous positive airway pressure) dependence     Diabetes mellitus ([address])     History of echocardiogram 2017    showed EF of 50-55 percentWith moderate LVH and left ventricle diastolic dysfunction. Left atrium was moderately enlarged. Trace MR noted. History of Holter monitoring 2017    showed baseline rhythm of sinus origin with an average heart it of 61 bpm. The lowest heart rate was 49 and the highest heart rate was 10 8 bpm. There were rare single VPCs, and frequent PACs representing 3.2% of total beats. There were several episodes of sinus arrhythmias with sinus bradycardia and heart rate ranging from 40-90 bpm. No sustained dysrhythmias, or pauses noted.  The patient did not    History of transfusion 2023    platelets    Liver disease     cirhosis    Obese     Sleep apnea    SOC: 2023  FOTO: 10/16/23  POC Expiration: 2023  Daily Treatment Log:  Date 10/23/23  10/26/23 10/30/23  11/2/23    Visit # 11 12 13 14    Manual                There Exer   30' 30'    Pulley 3'  3'  5'  5' Supine AAROM flex w/ cane 2x10  2 x 10  10x5"  2x10 5"     Std cane abd AAROM 2x10  2 x 10  2x10  2x10     Supination and pronation  20x hammer 1#  20x hammer 1#       Standing B/L ER  2x10  2 x 10  2x10  2x10     AROM flex in supine  1x10  1 x 10  1# 2x10  1# 2x10     AROM S/L abd  1x10  1 x 10  1# 2x5  1# 3x5    Bicep curl   2# 2x10  2# 2 x 10  2# 2x10  3# 2x10     Wall slides    2x10  2x10     S/L ER   2x5  3x5            Objective measures         HEP Updated + provided       Ther Activ                                                        NMReed   8' 8'    Scap depr/elev        Scap rows tubing Grn tube 2x10  Grn tube 2 x 10  Blue tube 2x10  Blue tube 2x10    B shldr ext to hip w/ tube Red tube 2x10  Red tube 2 x 10  Grn tube 2x10 Grn tube 2x10     Iso's ball vs wall Flex/abd/ext 2x10 5" ea Flex/abd/ext 2 x 10 5' ea. Modalities                                HEP:   Access Code: [email or web address]  URL: [email or web address]  Date: 10/23/2023  Prepared by: Eli Kid    Exercises  - Seated Elbow Flexion and Extension AROM  - 1-2 x daily - 7 x weekly - 1-2 sets - 10 reps  - Standing Shoulder Abduction ROM with Dowel  - 1 x daily - 7 x weekly - 2 sets - 10 reps  - Shoulder External Rotation and Scapular Retraction  - 1 x daily - 7 x weekly - 2 sets - 10 reps  - Supine Shoulder Flexion Extension AAROM with Dowel  - 1 x daily - 7 x weekly - 2 sets - 10 reps  - Isometric Shoulder Flexion at Wall  - 1 x daily - 7 x weekly - 2 sets - 10 reps

## 2023-11-06 ENCOUNTER — OFFICE VISIT (OUTPATIENT)
Dept: PHYSICAL THERAPY | Facility: CLINIC | Age: 68
End: 2023-11-06
Payer: MEDICARE

## 2023-11-06 DIAGNOSIS — Z47.1 AFTERCARE FOLLOWING LEFT SHOULDER JOINT REPLACEMENT SURGERY: Primary | ICD-10-CM

## 2023-11-06 DIAGNOSIS — Z96.612 AFTERCARE FOLLOWING LEFT SHOULDER JOINT REPLACEMENT SURGERY: Primary | ICD-10-CM

## 2023-11-06 PROCEDURE — 97112 NEUROMUSCULAR REEDUCATION: CPT

## 2023-11-06 PROCEDURE — 97110 THERAPEUTIC EXERCISES: CPT

## 2023-11-07 ENCOUNTER — TELEPHONE (OUTPATIENT)
Dept: GASTROENTEROLOGY | Facility: CLINIC | Age: 68
End: 2023-11-07

## 2023-11-07 DIAGNOSIS — K76.9 LIVER LESION: ICD-10-CM

## 2023-11-07 DIAGNOSIS — K70.30 ALCOHOLIC CIRRHOSIS OF LIVER WITHOUT ASCITES (HCC): Primary | ICD-10-CM

## 2023-11-07 NOTE — TELEPHONE ENCOUNTER
I spoke with the patient regarding his abnormal MRI results and cirrhosis and spots in the liver. I advised opinion from oncologist as well. Most likely will need a follow-up exam with imaging as well as AFP for close follow-up with imaging in 3 months. I will put a referral for oncology for the patient.

## 2023-11-08 ENCOUNTER — ANESTHESIA (OUTPATIENT)
Dept: ANESTHESIOLOGY | Facility: AMBULATORY SURGERY CENTER | Age: 68
End: 2023-11-08

## 2023-11-08 ENCOUNTER — ANESTHESIA EVENT (OUTPATIENT)
Dept: ANESTHESIOLOGY | Facility: AMBULATORY SURGERY CENTER | Age: 68
End: 2023-11-08

## 2023-11-09 ENCOUNTER — OFFICE VISIT (OUTPATIENT)
Dept: PHYSICAL THERAPY | Facility: CLINIC | Age: 68
End: 2023-11-09
Payer: MEDICARE

## 2023-11-09 ENCOUNTER — TELEPHONE (OUTPATIENT)
Age: 68
End: 2023-11-09

## 2023-11-09 DIAGNOSIS — Z12.11 ENCOUNTER FOR COLORECTAL CANCER SCREENING: Primary | ICD-10-CM

## 2023-11-09 DIAGNOSIS — Z96.612 AFTERCARE FOLLOWING LEFT SHOULDER JOINT REPLACEMENT SURGERY: Primary | ICD-10-CM

## 2023-11-09 DIAGNOSIS — Z12.12 ENCOUNTER FOR COLORECTAL CANCER SCREENING: Primary | ICD-10-CM

## 2023-11-09 DIAGNOSIS — Z47.1 AFTERCARE FOLLOWING LEFT SHOULDER JOINT REPLACEMENT SURGERY: Primary | ICD-10-CM

## 2023-11-09 PROCEDURE — 97112 NEUROMUSCULAR REEDUCATION: CPT

## 2023-11-09 PROCEDURE — 97110 THERAPEUTIC EXERCISES: CPT

## 2023-11-09 NOTE — TELEPHONE ENCOUNTER
Per anesthesiolgist pt needed to be scheduled in hospital setting. Called and spoke with pt. Rescheduled procedure for 12/19/2023 w/ Dr. Tang Cheney at [facility].     Please send Golytely to pt's [facility] on file. Golytely instructions sent via [employer].

## 2023-11-09 NOTE — PROGRESS NOTES
Daily Note     Today's date: 2023  Patient name: Lashawn Dsouza  : 1955  MRN: [id number]  Referring provider: Chelsi Westbrook PA-C  Dx:   Encounter Diagnosis     ICD-10-CM    1. Aftercare following left shoulder joint replacement surgery  Z47.1     Z96.612                      Subjective: Patient reports no new complaints at this time       Objective: See treatment diary below      Assessment: Tolerated treatment well and he continues to progress well however significant limitations in ER ROM noted at this time. Noted difficulty w/ uni UE wall slides and S/L ER with fatigue but no production of pain. Patient demonstrated fatigue post treatment, exhibited good technique with therapeutic exercises, and would benefit from continued PT      Plan: Continue per plan of care. Precautions: see protocol   Past Medical History:   Diagnosis Date    Arrhythmia     Chronic kidney disease     COVID 2020    CPAP (continuous positive airway pressure) dependence     Diabetes mellitus ([address])     History of echocardiogram 2017    showed EF of 50-55 percentWith moderate LVH and left ventricle diastolic dysfunction. Left atrium was moderately enlarged. Trace MR noted. History of Holter monitoring 2017    showed baseline rhythm of sinus origin with an average heart it of 61 bpm. The lowest heart rate was 49 and the highest heart rate was 10 8 bpm. There were rare single VPCs, and frequent PACs representing 3.2% of total beats. There were several episodes of sinus arrhythmias with sinus bradycardia and heart rate ranging from 40-90 bpm. No sustained dysrhythmias, or pauses noted.  The patient did not    History of transfusion 2023    platelets    Liver disease     cirhosis    Obese     Sleep apnea    SOC: 2023  FOTO: 10/16/23  POC Expiration: 2023  Daily Treatment Log:  Date 2023   Visit # 16   14 15 (FOTO)    Manual                There Exer 25'   30' 30'   Pulley 5' 5'    UBE 1.5' fwd / 1.5' bck        Supine AAROM flex w/ cane    2x10 5"     Std cane abd AAROM    2x10  2x10   Standing B/L ER  2x10    2x10  2x10    AROM flex in supine  1# 2x10    1# 2x10  1# 2x10    AROM S/L abd  1# 2x10    1# 3x5 1# 2x10    Bicep curl  3# 2x10    3# 2x10  3# 2x10    Wall slides  Uni flex 1x10    2x10  2x10    S/L ER 3x5    3x5 3x5           Objective measures         HEP        Ther Activ                                                        NMReed 15'   8' 8'   Scap retraction         Scap rows tubing Blue tubing 2x10    Blue tube 2x10 Blue tube 2x10   B shldr ext to hip w/ tube Grn tubing 2x10    Grn tube 2x10  Grn tube 2x10   Supine horiz abd/add  1x10        IR/ER walk outs vs tubing  5x ea. Red tubing for IR  Yellow for ER                        Modalities                                HEP:   Access Code: [email or web address]  URL: [email or web address]  Date: 11/09/2023  Prepared by: Whitley Moreira    Exercises  - Standing Shoulder Abduction ROM with Dowel  - 1 x daily - 7 x weekly - 2 sets - 10 reps  - Shoulder External Rotation and Scapular Retraction  - 1 x daily - 7 x weekly - 2 sets - 10 reps  - Shoulder Flexion Wall Slide with Towel  - 1 x daily - 7 x weekly - 1 sets - 10 reps - 5 sec hold  - Supine Shoulder Flexion Extension AAROM with Dowel  - 1 x daily - 7 x weekly - 2 sets - 10 reps  - Supine Shoulder Flexion Extension Full Range AROM  - 1 x daily - 7 x weekly - 2 sets - 10 reps  - Sidelying Shoulder Abduction Full Range of Motion  - 1 x daily - 7 x weekly - 2 sets - 10 reps  - Sidelying Shoulder External Rotation AROM  - 1 x daily - 7 x weekly - 3 sets - 5 reps

## 2023-11-10 ENCOUNTER — TELEPHONE (OUTPATIENT)
Dept: HEMATOLOGY ONCOLOGY | Facility: CLINIC | Age: 68
End: 2023-11-10

## 2023-11-10 NOTE — TELEPHONE ENCOUNTER
I called Jasmyn Murphy in response to a referral that was received for patient to establish care with Medical Oncology. Outreach was made to schedule a consultation. A consultation was scheduled for patient during this call.  Patient is scheduled on 12/14/23 at 2:20pm with Dr. Corin Giang at the [facility]

## 2023-11-19 ENCOUNTER — APPOINTMENT (EMERGENCY)
Dept: RADIOLOGY | Facility: HOSPITAL | Age: 68
End: 2023-11-19
Payer: MEDICARE

## 2023-11-19 ENCOUNTER — HOSPITAL ENCOUNTER (EMERGENCY)
Facility: HOSPITAL | Age: 68
Discharge: HOME | End: 2023-11-19
Attending: EMERGENCY MEDICINE | Admitting: EMERGENCY MEDICINE
Payer: MEDICARE

## 2023-11-19 VITALS
TEMPERATURE: 97.5 F | RESPIRATION RATE: 16 BRPM | OXYGEN SATURATION: 100 % | DIASTOLIC BLOOD PRESSURE: 61 MMHG | SYSTOLIC BLOOD PRESSURE: 133 MMHG | HEART RATE: 78 BPM

## 2023-11-19 DIAGNOSIS — L02.612 ABSCESS OF LEFT FOOT: Primary | ICD-10-CM

## 2023-11-19 DIAGNOSIS — L03.032 CELLULITIS OF SECOND TOE, LEFT: ICD-10-CM

## 2023-11-19 PROCEDURE — 87077 CULTURE AEROBIC IDENTIFY: CPT | Performed by: EMERGENCY MEDICINE

## 2023-11-19 PROCEDURE — 87186 SC STD MICRODIL/AGAR DIL: CPT | Performed by: EMERGENCY MEDICINE

## 2023-11-19 PROCEDURE — 73630 X-RAY EXAM OF FOOT: CPT

## 2023-11-19 PROCEDURE — 87070 CULTURE OTHR SPECIMN AEROBIC: CPT | Performed by: EMERGENCY MEDICINE

## 2023-11-19 PROCEDURE — 99284 EMERGENCY DEPT VISIT MOD MDM: CPT | Performed by: EMERGENCY MEDICINE

## 2023-11-19 PROCEDURE — 99284 EMERGENCY DEPT VISIT MOD MDM: CPT

## 2023-11-19 PROCEDURE — 87205 SMEAR GRAM STAIN: CPT | Performed by: EMERGENCY MEDICINE

## 2023-11-19 PROCEDURE — 10061 I&D ABSCESS COMP/MULTIPLE: CPT | Performed by: EMERGENCY MEDICINE

## 2023-11-19 RX ORDER — AMOXICILLIN AND CLAVULANATE POTASSIUM 875; 125 MG/1; MG/1
1 TABLET, FILM COATED ORAL ONCE
Status: COMPLETED | OUTPATIENT
Start: 2023-11-19 | End: 2023-11-19

## 2023-11-19 RX ORDER — AMOXICILLIN AND CLAVULANATE POTASSIUM 875; 125 MG/1; MG/1
1 TABLET, FILM COATED ORAL EVERY 12 HOURS
Qty: 20 TABLET | Refills: 0 | Status: SHIPPED | OUTPATIENT
Start: 2023-11-19 | End: 2023-11-19 | Stop reason: SDUPTHER

## 2023-11-19 RX ORDER — AMOXICILLIN AND CLAVULANATE POTASSIUM 875; 125 MG/1; MG/1
1 TABLET, FILM COATED ORAL EVERY 12 HOURS
Qty: 20 TABLET | Refills: 0 | Status: SHIPPED | OUTPATIENT
Start: 2023-11-19 | End: 2023-11-22 | Stop reason: ALTCHOICE

## 2023-11-19 RX ORDER — LIDOCAINE HYDROCHLORIDE 10 MG/ML
20 INJECTION, SOLUTION EPIDURAL; INFILTRATION; INTRACAUDAL; PERINEURAL ONCE
Status: COMPLETED | OUTPATIENT
Start: 2023-11-19 | End: 2023-11-19

## 2023-11-19 RX ADMIN — AMOXICILLIN AND CLAVULANATE POTASSIUM 1 TABLET: 875; 125 TABLET, FILM COATED ORAL at 15:08

## 2023-11-19 RX ADMIN — LIDOCAINE HYDROCHLORIDE 20 ML: 10 INJECTION, SOLUTION EPIDURAL; INFILTRATION; INTRACAUDAL at 14:35

## 2023-11-19 NOTE — DISCHARGE INSTRUCTIONS
Cleanse foot with mild soap and water. Cover site with gauze and wrap. Use surgical shoe and minimize weight application to the affected area applying weight primarily to the heel. Take Augmentin as prescribed for infection. Keep your appointment with Dr. Amanda Chin as scheduled for November 21. Return to the emergency department if you experience increased pain, progression of redness higher on the foot, develop fevers or any other worsening.

## 2023-11-19 NOTE — ED PROVIDER NOTES
History  Chief Complaint   Patient presents with    Blister     Pt reports bruise on foot that developed into blister over week, hx of same and needing drained     Patient is a 58-year-old male who presents to the emergency department for evaluation and treatment of his left foot. He reports hx of hammertoe& gestures to a swollen and discolored area on the plantar aspect of the foot proximal to the second toe. He explains he has been treated by Dr. Pauline Calero for a bruise at this area and wears a special orthotic for this along with his diabetic shoes. He was recently on vacation and on 1 daydid wear water shoes. He has developed increased swelling of the area below the toe and additionally since Thursday noticed some swelling and redness of the second toe. He has had similar exacerbations on prior occasions and has undergone drainage of the blister at the base of the toe and treatment with antibiotics. He notes that he was hospitalized once for the same symptoms and told by his podiatrist that outpatient management was appropriate. (Of note I did review chart. Patient was hospitalized in September 2022. In addition to the foot infection he did have an ABILIO for which he received inpatient treatment). Patient endorses otherwise feeling well without pain in the proximal foot, anywhere else in the lower extremity or any systemic symptoms including but not limited to fevers, myalgias or arthralgias. He does closely monitor his blood glucoses and these are typically between 90 and 120 in the morning. While on vacation diet habits varied from baseline and on occasion glucoses were up to the 150s. He has resumed his normal diet. Medical history significant for the diabetes, cirrhosis, CKD. He does have an appointment with Dr. Pauline Calero on Tuesday (2 days from now). Prior to Admission Medications   Prescriptions Last Dose Informant Patient Reported? Taking?    Insulin Pen Needle (BD Pen Needle Nayla 2nd Gen) 32G X 4 MM MISC  Self No No   Sig: For use with insulin pen. Pharmacy may dispense brand covered by insurance. Lantus SoloStar 100 units/mL SOPN  Self Yes No   Sig: Inject 40 Units under the skin daily at bedtime   Multiple Vitamin (multivitamin) capsule  Self No No   Sig: Take 1 capsule by mouth daily   benzonatate (TESSALON) 200 MG capsule  Self No No   Sig: TAKE 1 CAPSULE BY MOUTH 3 TIMES DAILY AS NEEDED for cough   furosemide (LASIX) 20 mg tablet  Self No No   Sig: Take 1 tablet (20 mg total) by mouth every other day   Patient taking differently: Take 20 mg by mouth every morning   glimepiride (AMARYL) 4 mg tablet  Self Yes No   Sig: Take 4 mg by mouth 2 (two) times a day   polyethylene glycol (GOLYTELY) 4000 mL solution   No No   Sig: Take 4,000 mL by mouth once for 1 dose   tamsulosin (FLOMAX) 0.4 mg  Self No No   Sig: Take 1 capsule (0.4 mg total) by mouth daily with dinner   traZODone (DESYREL) 50 mg tablet  Self Yes No   Sig: Take 50 mg by mouth daily at bedtime bedtime      Facility-Administered Medications: None       Past Medical History:   Diagnosis Date    Arrhythmia     Chronic kidney disease     COVID 12/2020    CPAP (continuous positive airway pressure) dependence     Diabetes mellitus (HCC)     History of echocardiogram 11/14/2017    showed EF of 50-55 percentWith moderate LVH and left ventricle diastolic dysfunction. Left atrium was moderately enlarged. Trace MR noted. History of Holter monitoring 11/21/2017    showed baseline rhythm of sinus origin with an average heart it of 61 bpm. The lowest heart rate was 49 and the highest heart rate was 10 8 bpm. There were rare single VPCs, and frequent PACs representing 3.2% of total beats. There were several episodes of sinus arrhythmias with sinus bradycardia and heart rate ranging from 40-90 bpm. No sustained dysrhythmias, or pauses noted.  The patient did not    History of transfusion 04/2023    platelets    Liver disease     cirhosis    Obese Sleep apnea        Past Surgical History:   Procedure Laterality Date    CATARACT EXTRACTION      EYE SURGERY Left     FL GUIDED NEEDLE PLAC BX/ASP/INJ  2023    HERNIA REPAIR  [id number]    JOINT REPLACEMENT Right     TKR    KNEE ARTHROPLASTY Right     WI ARTHROPLASTY GLENOHUMERAL JOINT TOTAL SHOULDER Left 2023    Procedure: ARTHROPLASTY SHOULDER REVERSE;  Surgeon: Tamy Elmore MD;  Location: [facility];  Service: Orthopedics    WI JARED SHOULDER ARTHRPLSTY HUMERAL&GLENOID COMPNT Left 2023    Procedure: removal of antibiotic spacer, incision and debridement,  with revision reverse shoulder arthroplasty;  Surgeon: Chasidy Dumas;  Location: [facility];  Service: Orthopedics    WI JARED SHOULDER ARTHRPLSTY HUMERAL/GLENOID COMPNT Left 2023    Procedure: ARTHROPLASTY SHOULDER REVISION;  Surgeon: Chasidy Dumas;  Location: [facility];  Service: Orthopedics    SHOULDER SURGERY Right     WOUND DEBRIDEMENT Left 2023    Procedure: INCISION AND DRAINAGE (I&D) EXTREMITY, vac placement;  Surgeon: Tamy Elmore MD;  Location: [facility];  Service: Orthopedics       Family History   Problem Relation Age of Onset    Diabetes Mother     Supraventricular tachycardia Mother     COPD Father     Heart disease Father     Other Father         Sepsis; related to UTI with complicating cardiac problems    Heart disease Paternal Grandmother      I have reviewed and agree with the history as documented.     E-Cigarette/Vaping    E-Cigarette Use Never User      E-Cigarette/Vaping Substances    Nicotine No     THC No     CBD No     Flavoring No     Other No     Unknown No      Social History     Tobacco Use    Smoking status: Former     Packs/day: 0.50     Years: 20.00     Total pack years: 10.00     Types: Cigarettes     Quit date:      Years since quittin.9    Smokeless tobacco: Never   Vaping Use    Vaping Use: Never used   Substance Use Topics    Alcohol use: Not Currently Comment: quit 8-2022    Drug use: Never       Review of Systems   All other systems reviewed and are negative. Physical Exam  Physical Exam  Vitals and nursing note reviewed. Constitutional:       Appearance: Normal appearance. HENT:      Head: Normocephalic. Mouth/Throat:      Mouth: Mucous membranes are moist.   Eyes:      Extraocular Movements: Extraocular movements intact. Conjunctiva/sclera: Conjunctivae normal.   Cardiovascular:      Rate and Rhythm: Normal rate. Pulmonary:      Effort: Pulmonary effort is normal. No respiratory distress. Musculoskeletal:         General: Normal range of motion. Comments: Patient with superior subluxation of the left second toe. This is mildly swollen, erythematous, warm and tender. Sensation intact. Capillary refill less than 2 seconds distally. +2 left PT and DP pulses. Just inferior to the left second and third toes on the plantar aspect of the foot a large thin roofed fluid collection is present. This area is deep purple in coloring. Foot is clean. No obvious open skin nor any evidence of drainage from site. Fluid collection with T shape. Proximally a thick callus is noted. There is very mild superficial disruption without any evidence of drainage. (Patient denies awareness of any puncture/foreign body presence and endorses chronic nature of this callus). Erythema is isolated only to the second toe. Remainder of foot is without erythema or increased warmth. Skin:     General: Skin is warm and dry. Findings: No rash. Neurological:      Mental Status: He is alert and oriented to person, place, and time.    Psychiatric:         Mood and Affect: Mood normal.         Behavior: Behavior normal.                     Vital Signs  ED Triage Vitals [11/19/23 1355]   Temperature Pulse Respirations Blood Pressure SpO2   97.5 °F (36.4 °C) 78 16 133/61 100 %      Temp Source Heart Rate Source Patient Position - Orthostatic VS BP Location FiO2 (%)   Oral Monitor Sitting Right arm --      Pain Score       1           Vitals:    11/19/23 1355   BP: 133/61   Pulse: 78   Patient Position - Orthostatic VS: Sitting         Visual Acuity      ED Medications  Medications   lidocaine (PF) (XYLOCAINE-MPF) 1 % injection 20 mL (20 mL Infiltration Given 11/19/23 1435)   amoxicillin-clavulanate (AUGMENTIN) 875-125 mg per tablet 1 tablet (1 tablet Oral Given 11/19/23 1508)       Diagnostic Studies  Results Reviewed       Procedure Component Value Units Date/Time    Wound culture and Gram stain [[id number]] Collected: 11/19/23 1527    Lab Status: In process Specimen: Wound from Foot, Left Updated: 11/19/23 1534                   XR foot 3+ views LEFT    (Results Pending)              Procedures  Incision and drain    Date/Time: 11/19/2023 2:45 PM    Performed by: Joyce Sage MD  Authorized by: Joyce Sage MD  Universal Protocol:  Consent: Verbal consent obtained. Consent given by: patient    Patient location:  ED  Location:     Type:  Fluid collection    Size:  3    Location:  Lower extremity    Lower extremity location:  L foot  Pre-procedure details:     Skin preparation:  Chloraprep  Procedure details:     Complexity:  Simple    Incision types:  Single straight    Scalpel blade:  11    Approach:  Open    Incision depth:  Superficial    Wound management:  Probed and deloculated and irrigated with saline    Drainage:  Serosanguinous    Drainage amount: Moderate    Wound treatment:  Wound left open  Post-procedure details:     Patient tolerance of procedure: Tolerated well, no immediate complications  Comments:      Site covered with gauze and overlying Nida wrap. Surgical shoe subsequently applied. ED Course  ED Course as of 11/20/23 0207   Cindi Eaton Nov 19, 2023   1450 Left foot x-ray reviewed and compared to one from September 13, 2022. On imaging today subluxation is appreciated at the second MTP.   No fracture or bony disruption/evidence of osteomyelitis is seen. Mild subluxation additionally appreciated of the third MTP.   1454 Left foot wound culture from September 14, 2022 reviewed. Klebsiella identified sensitive to all agents tested except for ampicillin alone and cefazolin. Will cover with Augmentin today. No evidence of osteomyelitis in the foot. Patient does have very close follow-up arranged with his specialist and demonstrates understanding of signs/symptoms for which to return to the ED. Fluid collection drained. I indicated that further debridement might be appropriate at the podiatrist office on Tuesday though deroofing of this area and initiation of antibiotic should begin treating infection. Patient demonstrated full understanding and was receptive to plan. He was aware of findings for which to return to the ED and noted that he does have a special boot which had been supplied by the podiatry office for offloading of weight from the area and will begin using this upon return home. SBIRT 22yo+      Flowsheet Row Most Recent Value   Initial Alcohol Screen: US AUDIT-C     1. How often do you have a drink containing alcohol? 0 Filed at: 11/19/2023 1428   2. How many drinks containing alcohol do you have on a typical day you are drinking? 0 Filed at: 11/19/2023 1428   3a. Male UNDER 65: How often do you have five or more drinks on one occasion? 0 Filed at: 11/19/2023 1428   3b. FEMALE Any Age, or MALE 65+: How often do you have 4 or more drinks on one occassion? 0 Filed at: 11/19/2023 1428   Audit-C Score 0 Filed at: 11/19/2023 1428   JERRY: How many times in the past year have you. .. Used an illegal drug or used a prescription medication for non-medical reasons? Never Filed at: 11/19/2023 1428                      Medical Decision Making  Amount and/or Complexity of Data Reviewed  Labs: ordered. Radiology: ordered. Risk  Prescription drug management. Disposition  Final diagnoses:   Abscess of left foot   Cellulitis of second toe, left     Time reflects when diagnosis was documented in both MDM as applicable and the Disposition within this note       Time User Action Codes Description Comment    11/19/2023  2:55 PM Gib Adjutant A Add [L02.612] Abscess of left foot     11/20/2023  2:07 AM Gib Adjutant A Add [L03.032] Cellulitis of second toe, left           ED Disposition       ED Disposition   Discharge    Condition   Stable    Date/Time   Sun Nov 19, 2023 2092 [facility] discharge to home/self care. Follow-up Information       Follow up With Specialties Details Why Contact Info    Hunter Polanco DPM Podiatry, Wound Care  Your appointment as scheduled for 11/21. [address] [address]  [phone]              Discharge Medication List as of 11/19/2023  2:59 PM        START taking these medications    Details   amoxicillin-clavulanate (AUGMENTIN) 875-125 mg per tablet Take 1 tablet by mouth every 12 (twelve) hours for 10 days, Starting Sun 11/19/2023, Until Wed 11/29/2023, Normal           CONTINUE these medications which have NOT CHANGED    Details   benzonatate (TESSALON) 200 MG capsule TAKE 1 CAPSULE BY MOUTH 3 TIMES DAILY AS NEEDED for cough, Starting Sun 8/27/2023, Normal      furosemide (LASIX) 20 mg tablet Take 1 tablet (20 mg total) by mouth every other day, Starting Wed 6/28/2023, Until Fri 9/8/2023, Normal      glimepiride (AMARYL) 4 mg tablet Take 4 mg by mouth 2 (two) times a day, Starting Wed 3/22/2023, Historical Med      Insulin Pen Needle (BD Pen Needle Nayla 2nd Gen) 32G X 4 MM MISC For use with insulin pen.  Pharmacy may dispense brand covered by insurance., Normal      Lantus SoloStar 100 units/mL SOPN Inject 40 Units under the skin daily at bedtime, Starting Mon 7/31/2023, Historical Med      Multiple Vitamin (multivitamin) capsule Take 1 capsule by mouth daily, Starting Sun 1/3/2021, Normal      polyethylene glycol (GOLYTELY) 4000 mL solution Take 4,000 mL by mouth once for 1 dose, Starting Thu 11/9/2023, Normal      tamsulosin (FLOMAX) 0.4 mg Take 1 capsule (0.4 mg total) by mouth daily with dinner, Starting Wed 6/28/2023, Until Fri 9/8/2023, Normal      traZODone (DESYREL) 50 mg tablet Take 50 mg by mouth daily at bedtime bedtime, Starting Fri 11/11/2022, Historical Med             No discharge procedures on file.     PDMP Review         Value Time User    PDMP Reviewed  Yes 6/20/2023  2:51 AM Katlyn Malone MD            ED Provider  Electronically Signed by             Crow Jane MD  11/20/23 1202

## 2023-11-20 ENCOUNTER — OFFICE VISIT (OUTPATIENT)
Dept: PHYSICAL THERAPY | Facility: CLINIC | Age: 68
End: 2023-11-20
Payer: MEDICARE

## 2023-11-20 DIAGNOSIS — Z96.612 AFTERCARE FOLLOWING LEFT SHOULDER JOINT REPLACEMENT SURGERY: Primary | ICD-10-CM

## 2023-11-20 DIAGNOSIS — Z47.1 AFTERCARE FOLLOWING LEFT SHOULDER JOINT REPLACEMENT SURGERY: Primary | ICD-10-CM

## 2023-11-20 PROCEDURE — 97110 THERAPEUTIC EXERCISES: CPT

## 2023-11-20 PROCEDURE — 97112 NEUROMUSCULAR REEDUCATION: CPT

## 2023-11-20 NOTE — PROGRESS NOTES
Daily Note     Today's date: 2023  Patient name: Amarjit Reed  : 1955  MRN: [id number]  Referring provider: Mary Zeng PA-C  Dx:   Encounter Diagnosis     ICD-10-CM    1. Aftercare following left shoulder joint replacement surgery  Z47.1     Z96.612                      Subjective: Patient reports he was traveling the past week and states his shoulder was sore carrying his luggage. Patient reports he has been able to drive and open car doors w/o restrepo. Notes he struggles with reaching overhead to grab any item that has some weight too it. Objective: See treatment diary below      Assessment: Tolerated treatment well and he continues to progress well however significant limitations in ER ROM noted at this time. Modified session today to aviod prolonged standing due to patient in boot from wound on his foot. Patient demonstrated fatigue post treatment, exhibited good technique with therapeutic exercises, and would benefit from continued PT      Plan: Continue per plan of care. Returns to surgeon for follow up on 23     Precautions: see protocol   Past Medical History:   Diagnosis Date    Arrhythmia     Chronic kidney disease     COVID 2020    CPAP (continuous positive airway pressure) dependence     Diabetes mellitus ([address])     History of echocardiogram 2017    showed EF of 50-55 percentWith moderate LVH and left ventricle diastolic dysfunction. Left atrium was moderately enlarged. Trace MR noted. History of Holter monitoring 2017    showed baseline rhythm of sinus origin with an average heart it of 61 bpm. The lowest heart rate was 49 and the highest heart rate was 10 8 bpm. There were rare single VPCs, and frequent PACs representing 3.2% of total beats. There were several episodes of sinus arrhythmias with sinus bradycardia and heart rate ranging from 40-90 bpm. No sustained dysrhythmias, or pauses noted.  The patient did not    History of transfusion 2023 platelets    Liver disease     cirhosis    Obese     Sleep apnea    SOC: 9/18/2023  FOTO: 10/16/23  POC Expiration: 12/11/2023  Daily Treatment Log:  Date 11/9/2023 11/20/23       Visit # 16 [id number]      Manual                There Exer 25' 30'      [place]ley        UBE 1.5' fwd / 1.5' bck  1.5 fwd/1.5retro       Supine AAROM flex w/ cane        Std cane abd AAROM  W/ eccentric return 2x10       Standing B/L ER  2x10  2x10       AROM flex in supine  1# 2x10  1# 2x10         AROM S/L abd  1# 2x10  1# 2x10       Bicep curl  3# 2x10  3# 2x10       Wall slides  Uni flex 1x10  1x10 uni flex       S/L ER 3x5  1# 2x10              Objective measures         HEP        Ther Activ                                                        NMReed 15' 10      Scap retraction         Scap rows tubing Blue tubing 2x10  Page 8# 2x10       B shldr ext to hip w/ tube Grn tubing 2x10  [place] 5# 2x10       Supine horiz abd/add  1x10        IR/ER walk outs vs tubing  5x ea. Red tubing for IR  Yellow for ER                        Modalities                                HEP:   Access Code: [email or web address]  URL: [email or web address]  Date: 11/09/2023  Prepared by: Grant Nicole    Exercises  - Standing Shoulder Abduction ROM with Dowel  - 1 x daily - 7 x weekly - 2 sets - 10 reps  - Shoulder External Rotation and Scapular Retraction  - 1 x daily - 7 x weekly - 2 sets - 10 reps  - Shoulder Flexion Wall Slide with Towel  - 1 x daily - 7 x weekly - 1 sets - 10 reps - 5 sec hold  - Supine Shoulder Flexion Extension AAROM with Dowel  - 1 x daily - 7 x weekly - 2 sets - 10 reps  - Supine Shoulder Flexion Extension Full Range AROM  - 1 x daily - 7 x weekly - 2 sets - 10 reps  - Sidelying Shoulder Abduction Full Range of Motion  - 1 x daily - 7 x weekly - 2 sets - 10 reps  - Sidelying Shoulder External Rotation AROM  - 1 x daily - 7 x weekly - 3 sets - 5 reps

## 2023-11-21 ENCOUNTER — OFFICE VISIT (OUTPATIENT)
Dept: PODIATRY | Facility: CLINIC | Age: 68
End: 2023-11-21
Payer: MEDICARE

## 2023-11-21 VITALS
WEIGHT: 245 LBS | HEIGHT: 71 IN | BODY MASS INDEX: 34.3 KG/M2 | DIASTOLIC BLOOD PRESSURE: 72 MMHG | HEART RATE: 75 BPM | SYSTOLIC BLOOD PRESSURE: 116 MMHG

## 2023-11-21 DIAGNOSIS — E11.40 TYPE 2 DIABETES MELLITUS WITH DIABETIC NEUROPATHY, WITH LONG-TERM CURRENT USE OF INSULIN (HCC): ICD-10-CM

## 2023-11-21 DIAGNOSIS — L97.522 ULCER OF LEFT FOOT WITH FAT LAYER EXPOSED (HCC): Primary | ICD-10-CM

## 2023-11-21 DIAGNOSIS — Z79.4 TYPE 2 DIABETES MELLITUS WITH DIABETIC NEUROPATHY, WITH LONG-TERM CURRENT USE OF INSULIN (HCC): ICD-10-CM

## 2023-11-21 PROCEDURE — 11042 DBRDMT SUBQ TIS 1ST 20SQCM/<: CPT | Performed by: PODIATRIST

## 2023-11-21 PROCEDURE — 99213 OFFICE O/P EST LOW 20 MIN: CPT | Performed by: PODIATRIST

## 2023-11-21 NOTE — PROGRESS NOTES
Patient ID: Hayde Qureshi is a 77 y.o. male Date of Birth 1955       Assessment:    No problem-specific Assessment & Plan notes found for this encounter. Diagnoses and all orders for this visit:    Ulcer of left foot with fat layer exposed ([address])  -     Debridement    Type 2 diabetes mellitus with diabetic neuropathy, with long-term current use of insulin ([address])  -     Debridement            Debridement   Wound 06/24/23 Other (comment) Toe (Comment  which one) Left    Universal Protocol:  Consent: Verbal consent obtained. Risks and benefits: risks, benefits and alternatives were discussed  Consent given by: patient  Time out: Immediately prior to procedure a "time out" was called to verify the correct patient, procedure, equipment, support staff and site/side marked as required. Timeout called at: 11/21/2023 10:09 AM.  Patient understanding: patient states understanding of the procedure being performed  Patient identity confirmed: verbally with patient    Performed by: physician  Debridement type: surgical  Level of debridement: subcutaneous tissue    Post-debridement measurements  Length (cm): 1  Width (cm): 0.8  Depth (cm): 0.2  Percent debrided: 100%  Surface Area (cm^2): 0.8  Area debrided (cm^2): 0.8  Volume (cm^3): 0.16  Tissue and other material debrided: dermis, epidermis and subcutaneous tissue  Devitalized tissue debrided: biofilm, callus, fibrin and slough  Instrument(s) utilized: blade  Bleeding: medium  Hemostasis obtained with: pressure  Procedural pain (0-10): 0  Post-procedural pain: 0   Response to treatment: procedure was tolerated well        Plan:  1. Reviewed medical records. Reviewed the note from ED. Reviewed X-ray of left foot. Reviewed wound culture. 2. He has recurring ulcer in left submet 2 area. Wound debrided. See procedure. Instructed daily local care. 3. Continue orthowedge shoe.     4. Pt to call the office if any signs of infection develop or significant change in appearance of wound. 5. Pt to finish the current antibiotics. 6. He will return in 1 week. Subjective:      . HPI    Amber Guzman presents for evaluation of left foot ulcer. He was on vacation. He was wearing water shoes in pool and developed blister. He came back in town on Sunday and went to ED. He is on oral antibiotics. No pain. BS under control. The following portions of the patient's history were reviewed and updated as appropriate: allergies, current medications, past family history, past medical history, past social history, past surgical history and problem list.    Review of Systems   Constitutional:  Negative for chills and fever. Respiratory:  Negative for shortness of breath. Cardiovascular:  Negative for chest pain. Gastrointestinal:  Negative for diarrhea, nausea and vomiting. Musculoskeletal:  Negative for gait problem. Objective:            /72   Pulse 75   Ht 5' 11" (1.803 m)   Wt 111 kg (245 lb)   BMI 34.17 kg/m²     Physical Exam  Vitals reviewed. Constitutional:       General: He is not in acute distress. Appearance: Normal appearance. He is not ill-appearing or toxic-appearing. Cardiovascular:      Rate and Rhythm: Normal rate and regular rhythm. Pulses: Normal pulses. Pulmonary:      Effort: Pulmonary effort is normal. No respiratory distress. Musculoskeletal:         General: Deformity present. No tenderness or signs of injury. Skin:     General: Skin is warm. Coloration: Skin is not cyanotic or mottled. Findings: No abscess, erythema or rash. Nails: There is no clubbing. Comments: Keratosis / epidermolysis / Tang Golder noted left submet 2 area. Full thickness ulcer presents under the lesion. No cellulitis. No dennys pus. Neurological:      General: No focal deficit present. Mental Status: He is alert and oriented to person, place, and time. Cranial Nerves: No cranial nerve deficit. Sensory: Sensory deficit present. Coordination: Coordination normal.   Psychiatric:         Mood and Affect: Mood normal.         Behavior: Behavior normal.         Thought Content:  Thought content normal.         Judgment: Judgment normal.

## 2023-11-22 ENCOUNTER — HOSPITAL ENCOUNTER (OUTPATIENT)
Dept: RADIOLOGY | Facility: HOSPITAL | Age: 68
Discharge: HOME | End: 2023-11-22
Attending: ORTHOPAEDIC SURGERY
Payer: MEDICARE

## 2023-11-22 ENCOUNTER — OFFICE VISIT (OUTPATIENT)
Dept: OBGYN CLINIC | Facility: CLINIC | Age: 68
End: 2023-11-22

## 2023-11-22 ENCOUNTER — TELEPHONE (OUTPATIENT)
Age: 68
End: 2023-11-22

## 2023-11-22 VITALS
SYSTOLIC BLOOD PRESSURE: 140 MMHG | DIASTOLIC BLOOD PRESSURE: 85 MMHG | WEIGHT: 258.6 LBS | HEART RATE: 89 BPM | BODY MASS INDEX: 36.2 KG/M2 | HEIGHT: 71 IN

## 2023-11-22 DIAGNOSIS — Z96.612 S/P REVERSE TOTAL SHOULDER ARTHROPLASTY, LEFT: ICD-10-CM

## 2023-11-22 DIAGNOSIS — Z96.612 S/P REVERSE TOTAL SHOULDER ARTHROPLASTY, LEFT: Primary | ICD-10-CM

## 2023-11-22 LAB
BACTERIA WND AEROBE CULT: ABNORMAL
GRAM STN SPEC: ABNORMAL
GRAM STN SPEC: ABNORMAL

## 2023-11-22 PROCEDURE — 73030 X-RAY EXAM OF SHOULDER: CPT

## 2023-11-22 PROCEDURE — 99024 POSTOP FOLLOW-UP VISIT: CPT | Performed by: ORTHOPAEDIC SURGERY

## 2023-11-22 RX ORDER — DOXYCYCLINE HYCLATE 100 MG/1
100 CAPSULE ORAL 2 TIMES DAILY
Qty: 14 CAPSULE | Refills: 0 | Status: SHIPPED | OUTPATIENT
Start: 2023-11-22 | End: 2023-11-29

## 2023-11-22 NOTE — PROGRESS NOTES
[address]  [address]  [facility] [address]  [phone]       Domo Bowden  [id number]  1955    ORTHOPAEDIC SURGERY OUTPATIENT NOTE  11/22/2023      HISTORY:  77 y.o. male here for a 6-week follow-up for his left shoulder. He is 2 and half months status post removal of antibiotic spacer, incision and debridement with revision reverse shoulder arthroplasty, left performed on 9/8/2023. He rates his pain 1/10. Sane score 75%  Patient was recently in the ED for a left foot abscess that was I&D at bedside. Patient saw podiatry 11/21/2023 for debridement and presents today in [employer] shoe. He is currently on Augmentin and instructed by his podiatrist to finish that out. He walked in water shoes on vacation. Past Medical History:   Diagnosis Date    Arrhythmia     Chronic kidney disease     COVID 12/2020    CPAP (continuous positive airway pressure) dependence     Diabetes mellitus ([address])     History of echocardiogram 11/14/2017    showed EF of 50-55 percentWith moderate LVH and left ventricle diastolic dysfunction. Left atrium was moderately enlarged. Trace MR noted. History of Holter monitoring 11/21/2017    showed baseline rhythm of sinus origin with an average heart it of 61 bpm. The lowest heart rate was 49 and the highest heart rate was 10 8 bpm. There were rare single VPCs, and frequent PACs representing 3.2% of total beats. There were several episodes of sinus arrhythmias with sinus bradycardia and heart rate ranging from 40-90 bpm. No sustained dysrhythmias, or pauses noted.  The patient did not    History of transfusion 04/2023    platelets    Liver disease     cirhosis    Obese     Sleep apnea        Past Surgical History:   Procedure Laterality Date    CATARACT EXTRACTION      EYE SURGERY Left 1997    FL GUIDED NEEDLE PLAC BX/ASP/INJ  8/28/2023    HERNIA REPAIR  [id number]    JOINT REPLACEMENT Right     TKR KNEE ARTHROPLASTY Right     KY ARTHROPLASTY GLENOHUMERAL JOINT TOTAL SHOULDER Left 2023    Procedure: ARTHROPLASTY SHOULDER REVERSE;  Surgeon: Leanna Godoy MD;  Location: [facility];  Service: Orthopedics    [place] [facility] HUMERAL&GLENOID COMPNT Left 2023    Procedure: removal of antibiotic spacer, incision and debridement,  with revision reverse shoulder arthroplasty;  Surgeon: Bruno Headings;  Location: [facility];  Service: Orthopedics    [place] JARED SHOULDER ARTHRPLSTY HUMERAL/GLENOID COMPNT Left 2023    Procedure: ARTHROPLASTY SHOULDER REVISION;  Surgeon: Bruno Headings;  Location: [facility];  Service: Orthopedics    SHOULDER SURGERY Right 2002    WOUND DEBRIDEMENT Left 2023    Procedure: INCISION AND DRAINAGE (I&D) EXTREMITY, vac placement;  Surgeon: Leanna Godoy MD;  Location: [facility];  Service: Orthopedics       Social History     Socioeconomic History    Marital status: /Civil Union     Spouse name: Not on file    Number of children: 2    Years of education: 16    Highest education level: Some college, no degree   Occupational History    Not on file   Tobacco Use    Smoking status: Former     Packs/day: 0.50     Years: 20.00     Total pack years: 10.00     Types: Cigarettes     Quit date:      Years since quittin.9    Smokeless tobacco: Never   Vaping Use    Vaping Use: Never used   Substance and Sexual Activity    Alcohol use: Not Currently     Comment: quit     Drug use: Never    Sexual activity: Not Currently   Other Topics Concern    Not on file   Social History Narrative    · Most recent tobacco use screenin2018      · Do you currently or have you served in the [address] [place] [address] [email or web address]:   No      · Live alone or with others:   with others      · High blood pressure: Yes      · Exercise level:   Occasional      · Overweight:   Yes      · Obese:    Yes      · Diabetes:   Yes      Social Determinants of Health Financial Resource Strain: Not on file   Food Insecurity: No Food Insecurity (9/8/2023)    Hunger Vital Sign     Worried About Running Out of Food in the Last Year: Never true     Ran Out of Food in the Last Year: Never true   Transportation Needs: No Transportation Needs (9/8/2023)    PRAPARE - Transportation     Lack of Transportation (Medical): No     Lack of Transportation (Non-Medical): No   Physical Activity: Not on file   Stress: Not on file   Social Connections: Not on file   Intimate Partner Violence: Not on file   Housing Stability: Low Risk  (9/8/2023)    Housing Stability Vital Sign     Unable to Pay for Housing in the Last Year: No     Number of Places Lived in the Last Year: 1     Unstable Housing in the Last Year: No       Family History   Problem Relation Age of Onset    Diabetes Mother     Supraventricular tachycardia Mother     COPD Father     Heart disease Father     Other Father         Sepsis; related to UTI with complicating cardiac problems    Heart disease Paternal Grandmother         Patient's Medications   New Prescriptions    No medications on file   Previous Medications    AMOXICILLIN-CLAVULANATE (AUGMENTIN) 875-125 MG PER TABLET    Take 1 tablet by mouth every 12 (twelve) hours for 10 days    BENZONATATE (TESSALON) 200 MG CAPSULE    TAKE 1 CAPSULE BY MOUTH 3 TIMES DAILY AS NEEDED for cough    FUROSEMIDE (LASIX) 20 MG TABLET    Take 1 tablet (20 mg total) by mouth every other day    GLIMEPIRIDE (AMARYL) 4 MG TABLET    Take 4 mg by mouth 2 (two) times a day    INSULIN PEN NEEDLE (BD PEN NEEDLE TERRA 2ND GEN) 32G X 4 MM MISC    For use with insulin pen. Pharmacy may dispense brand covered by insurance.     LANTUS SOLOSTAR 100 UNITS/ML SOPN    Inject 40 Units under the skin daily at bedtime    MULTIPLE VITAMIN (MULTIVITAMIN) CAPSULE    Take 1 capsule by mouth daily    POLYETHYLENE GLYCOL (GOLYTELY) 4000 ML SOLUTION    Take 4,000 mL by mouth once for 1 dose    TAMSULOSIN (FLOMAX) 0.4 MG Take 1 capsule (0.4 mg total) by mouth daily with dinner    TRAZODONE (DESYREL) 50 MG TABLET    Take 50 mg by mouth daily at bedtime bedtime   Modified Medications    No medications on file   Discontinued Medications    No medications on file       Allergies   Allergen Reactions    Sulfa Antibiotics Hives    Daptomycin Other (See Comments)     Possibly contributed to ABILIO on 6/2023 admission         /85 (BP Location: Left arm, Patient Position: Sitting, Cuff Size: Standard)   Pulse 89   Ht 5' 11" (1.803 m)   Wt 117 kg (258 lb 9.6 oz)   BMI 36.07 kg/m²      REVIEW OF SYSTEMS:  Constitutional: Negative. HEENT: Negative. Respiratory: Negative. Skin: Negative. Neurological: Negative. Psychiatric/Behavioral: Negative. Musculoskeletal: Negative except for that mentioned in the HPI. PHYSICAL EXAM:  LEFT SHOULDER:    Appearance: Well healed surgical incision, no erythema, swelling, drainage or other signs of infection. Forward flexion:   170 degrees   Abduction:  90 degrees   External rotation at 0 degrees:   15 degrees   Internal rotation: Sacrum     STRENGTH:  Forward flexion:  4/5   Abduction:  5/5   External rotation:  4/5   Internal rotation:  4/5        Radial/median/ulnar nerve intact    <2 sec cap refill       IMAGING:  Xrays of     ASSESSMENT AND PLAN:  77 y.o. male  At this time, focus on the strength portion or this rehab. Since he is going to [place] in January, recommend to ask his therapist a good HEP to do when he is away. Recommended patient to call his podiatrist in regards to discuss the wound culture came back and possibly change his antibiotic.  Looks like Cipro  2.5 is a better option, but consult with his podiatrist.   We will follow up in 3 months for re-evaluation and new xrays of the left shoulder    Scribe Attestation      I,:  Veronica Paez am acting as a scribe while in the presence of the attending physician.:       I,:  Kiet Lawton personally performed the services described in this documentation    as scribed in my presence.:

## 2023-11-22 NOTE — TELEPHONE ENCOUNTER
Caller: Christine Nails    Doctor/Office: Dr. Cyndee Dow    #: [phone]    Escalation: Care Patient would like a return call to discuss the culture [facility] His [facility]o dr said they could be of concern and he wanted to make Dr. Kate Ponce aware.  Thank you

## 2023-11-27 ENCOUNTER — OFFICE VISIT (OUTPATIENT)
Dept: PHYSICAL THERAPY | Facility: CLINIC | Age: 68
End: 2023-11-27
Payer: MEDICARE

## 2023-11-27 DIAGNOSIS — Z47.1 AFTERCARE FOLLOWING LEFT SHOULDER JOINT REPLACEMENT SURGERY: Primary | ICD-10-CM

## 2023-11-27 DIAGNOSIS — Z96.612 AFTERCARE FOLLOWING LEFT SHOULDER JOINT REPLACEMENT SURGERY: Primary | ICD-10-CM

## 2023-11-27 PROCEDURE — 97112 NEUROMUSCULAR REEDUCATION: CPT

## 2023-11-27 PROCEDURE — 97110 THERAPEUTIC EXERCISES: CPT

## 2023-11-27 NOTE — PROGRESS NOTES
Daily Note     Today's date: 2023  Patient name: Mendy Matos  : 1955  MRN: [id number]  Referring provider: Golden Larsen PA-C  Dx:   Encounter Diagnosis     ICD-10-CM    1. Aftercare following left shoulder joint replacement surgery  Z47.1     H89.290                      Subjective: Patient reports he saw the MD last week and they were happy with the results so far. Patient reports some limitations with strength however pain has not been an issue. Objective: See treatment diary below      Assessment: Tolerated treatment well and he continues to progress well however significant limitations in ER ROM noted at this time. Cont to progress strengthening as tolerated per MD protocol. Patient demonstrated fatigue post treatment, exhibited good technique with therapeutic exercises, and would benefit from continued PT      Plan: Continue per plan of care. Precautions: see protocol   Past Medical History:   Diagnosis Date    Arrhythmia     Chronic kidney disease     COVID 2020    CPAP (continuous positive airway pressure) dependence     Diabetes mellitus ([address])     History of echocardiogram 2017    showed EF of 50-55 percentWith moderate LVH and left ventricle diastolic dysfunction. Left atrium was moderately enlarged. Trace MR noted. History of Holter monitoring 2017    showed baseline rhythm of sinus origin with an average heart it of 61 bpm. The lowest heart rate was 49 and the highest heart rate was 10 8 bpm. There were rare single VPCs, and frequent PACs representing 3.2% of total beats. There were several episodes of sinus arrhythmias with sinus bradycardia and heart rate ranging from 40-90 bpm. No sustained dysrhythmias, or pauses noted.  The patient did not    History of transfusion 2023    platelets    Liver disease     cirhosis    Obese     Sleep apnea    SOC: 2023  FOTO: 10/16/23  POC Expiration: 2023  Daily Treatment Log:  Date 23 11/27/2023     Visit # 16 17 18     Manual                There Exer 25' 27' 27'     [place]        UBE 1.5' fwd / 1.5' bck  1.5 fwd/1.5retro  Alt 1' fwd/bck for 4'      Supine AAROM flex w/ cane        Std cane abd AAROM  W/ eccentric return 2x10  W/ eccentric return 2x10      Standing B/L ER  2x10  2x10  2x10      AROM flex in supine  1# 2x10  1# 2x10    2# 2x10      AROM S/L abd  1# 2x10  1# 2x10  2# 2x10      Bicep curl  3# 2x10  3# 2x10  3# 2x10      Wall slides  Uni flex 1x10  1x10 uni flex  3" 1x10 uni flex      S/L ER 3x5  1# 2x10 1# 2x10      Standing AROM flex    1x10      Stadning AROM abd    1x10      Objective measures         HEP        Ther Activ                                                        NMReed 15' 10 10'     Scap retraction         Scap rows tubing Blue tubing 2x10  [place] 8# 2x10  [place] 8.5# 2x10      B shldr ext to hip w/ tube Grn tubing 2x10  [place] 5# 2x10  [place] 8.5# 2x10     Supine horiz abd/add  1x10        IR/ER walk outs vs tubing  5x ea. Red tubing for IR  Yellow for ER   Page 4# 2x5 ea. Modalities                                HEP:   Access Code: [email or web address]  URL: [email or web address]  Date: 11/09/2023  Prepared by: Lobo Star    Exercises  - Standing Shoulder Abduction ROM with Dowel  - 1 x daily - 7 x weekly - 2 sets - 10 reps  - Shoulder External Rotation and Scapular Retraction  - 1 x daily - 7 x weekly - 2 sets - 10 reps  - Shoulder Flexion Wall Slide with Towel  - 1 x daily - 7 x weekly - 1 sets - 10 reps - 5 sec hold  - Supine Shoulder Flexion Extension AAROM with Dowel  - 1 x daily - 7 x weekly - 2 sets - 10 reps  - Supine Shoulder Flexion Extension Full Range AROM  - 1 x daily - 7 x weekly - 2 sets - 10 reps  - Sidelying Shoulder Abduction Full Range of Motion  - 1 x daily - 7 x weekly - 2 sets - 10 reps  - Sidelying Shoulder External Rotation AROM  - 1 x daily - 7 x weekly - 3 sets - 5 reps

## 2023-11-28 ENCOUNTER — OFFICE VISIT (OUTPATIENT)
Dept: PODIATRY | Facility: CLINIC | Age: 68
End: 2023-11-28
Payer: MEDICARE

## 2023-11-28 VITALS
SYSTOLIC BLOOD PRESSURE: 154 MMHG | DIASTOLIC BLOOD PRESSURE: 78 MMHG | HEART RATE: 82 BPM | BODY MASS INDEX: 36.12 KG/M2 | WEIGHT: 258 LBS | HEIGHT: 71 IN

## 2023-11-28 DIAGNOSIS — Z79.4 TYPE 2 DIABETES MELLITUS WITH DIABETIC NEUROPATHY, WITH LONG-TERM CURRENT USE OF INSULIN (HCC): ICD-10-CM

## 2023-11-28 DIAGNOSIS — L97.522 ULCER OF LEFT FOOT WITH FAT LAYER EXPOSED (HCC): Primary | ICD-10-CM

## 2023-11-28 DIAGNOSIS — E11.40 TYPE 2 DIABETES MELLITUS WITH DIABETIC NEUROPATHY, WITH LONG-TERM CURRENT USE OF INSULIN (HCC): ICD-10-CM

## 2023-11-28 PROCEDURE — 99212 OFFICE O/P EST SF 10 MIN: CPT | Performed by: PODIATRIST

## 2023-11-28 NOTE — PROGRESS NOTES
Patient ID: Mendy Matos is a 77 y.o. male Date of Birth 1955       Assessment:    No problem-specific Assessment & Plan notes found for this encounter. Diagnoses and all orders for this visit:    Ulcer of left foot with fat layer exposed ([address])    Type 2 diabetes mellitus with diabetic neuropathy, with long-term current use of insulin ([address])            Procedures    Plan:  1. Wound is almost healed. Continue local care and orthowedge shoe. 2. Discussed surgical treatment to reduce his deformity to prevent recurring ulcer. He would consider it after his trip in January. 3. Pt to call the office if any signs of infection develop or significant change in appearance of wound. 4. He will return in 3 weeks. Subjective:      . RYAN  Andre Tapia presents for evaluation of left foot ulcer. Minimal drainage. No redness. No pain. BS under control. The following portions of the patient's history were reviewed and updated as appropriate: allergies, current medications, past family history, past medical history, past social history, past surgical history and problem list.    Review of Systems   Constitutional:  Negative for chills and fever. Respiratory:  Negative for shortness of breath. Cardiovascular:  Negative for chest pain. Gastrointestinal:  Negative for diarrhea, nausea and vomiting. Musculoskeletal:  Negative for gait problem. Objective:        /78   Pulse 82   Ht 5' 11" (1.803 m)   Wt 117 kg (258 lb)   BMI 35.98 kg/m²     Physical Exam  Vitals reviewed. Constitutional:       General: He is not in acute distress. Appearance: Normal appearance. He is not ill-appearing or toxic-appearing. Cardiovascular:      Rate and Rhythm: Normal rate and regular rhythm. Pulses: Normal pulses. Pulmonary:      Effort: Pulmonary effort is normal. No respiratory distress. Musculoskeletal:         General: Deformity present. No tenderness or signs of injury.    Skin: General: Skin is warm. Coloration: Skin is not cyanotic or mottled. Findings: No abscess, erythema or rash. Nails: There is no clubbing. Comments: Pin point wound left submet 2 area. No infection. Neurological:      General: No focal deficit present. Mental Status: He is alert and oriented to person, place, and time. Cranial Nerves: No cranial nerve deficit. Sensory: Sensory deficit present. Coordination: Coordination normal.   Psychiatric:         Mood and Affect: Mood normal.         Behavior: Behavior normal.         Thought Content:  Thought content normal.         Judgment: Judgment normal.

## 2023-11-30 ENCOUNTER — EVALUATION (OUTPATIENT)
Dept: PHYSICAL THERAPY | Facility: CLINIC | Age: 68
End: 2023-11-30
Payer: MEDICARE

## 2023-11-30 DIAGNOSIS — Z96.612 AFTERCARE FOLLOWING LEFT SHOULDER JOINT REPLACEMENT SURGERY: Primary | ICD-10-CM

## 2023-11-30 DIAGNOSIS — Z47.1 AFTERCARE FOLLOWING LEFT SHOULDER JOINT REPLACEMENT SURGERY: Primary | ICD-10-CM

## 2023-11-30 PROCEDURE — 97112 NEUROMUSCULAR REEDUCATION: CPT

## 2023-11-30 PROCEDURE — 97110 THERAPEUTIC EXERCISES: CPT

## 2023-11-30 NOTE — PROGRESS NOTES
PT Re-Evaluation     Today's date: 2023  Patient name: Ashley Cutler  : 1955  MRN: [id number]  Referring provider: Leif Wiseman PA-C  Dx:   Encounter Diagnosis     ICD-10-CM    1. Aftercare following left shoulder joint replacement surgery  Z47.1     Z96.612                      Assessment  Assessment details: Ashley Cutler is a 69 y.o. male who presents for re-eval ost op revision of reverse TSA performed on 2023 with improvements in pain, shoulder ROM, joint effusion, and posture, however deficits and limitations per protocol still present  Due to these impairments, patient has difficulty performing ADL's, recreational activities, engaging in social activities, lifting/carrying, transfers, reaching. Patient has been educated in post-op protocol, home exercise program and plan of care. Recent progression into strengthening phase of protocol, pt edu that his remaining stiffness/soreness is most likely related to intro to deltoid strengthening.   Patient would benefit from skilled physical therapy services to address their aforementioned functional limitations and progress per MD protocol towards prior level of function and independence with home exercise program.   Impairments: abnormal or restricted ROM, abnormal movement, activity intolerance, impaired physical strength, lacks appropriate home exercise program, pain with function, poor posture  and poor body mechanics    Goals  Short Term Goals to be accomplished in 4 weeks:  STG1: Pt will be I with HEP to maximize progress between therapy sessions ---MET  STG2: Pt will be I with posture management --MET  STG3: Pt will demo shoulder flexion and abduction PROM of at least 90 deg to progress towards AAROM and AROM ---MET  STG4: Pt will demo 1/2 MMT strength in shoulder ---unable to test at this time per protocol   STG5: Pt will report pain 2/10 in shoulder or less at worst --MET     Long Term Goals to be accomplished in 12 weeks: Progressing towards  LTG1: Pt will demo full cervical AROM to prevent impingement in L shoulder  LTG2: Pt will return to work/household ADLs 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 LTG3: Pt will demo shoulder strength WNL to allow lifting/carrying. LTG4: Pt will demo at least 120 deg of shoulder AROM to improve self care (showering) and household ADLs (opening car door). Plan  Plan details: HEP development, stretching, strengthening, A/AA/PROM, joint mobilizations, posture education, STM/MI as needed to reduce muscle tension, muscle reeducation, PLOC discussed and agreed upon with patient. Patient would benefit from: skilled physical therapy  Planned modality interventions: cryotherapy and thermotherapy: hydrocollator packs  Planned therapy interventions: home exercise program, therapeutic exercise, therapeutic activities, self care, patient education, manual therapy, neuromuscular re-education, IASTM, joint mobilization and postural training  Frequency: 2x week  Plan of Care beginning date: 9/18/2023  Plan of Care expiration date: 1/4/2024  Treatment plan discussed with: patient        Subjective Evaluation    History of Present Illness  Date of surgery: 9/8/2023  Mechanism of injury: RE: Patient reports inc stiffness in his shoulder over the last week, states he is unsure why. Cont to deny pain and notes he remains happy with his progress. Orthopedic surgeon was also pleased with his progression.      Patient Goals  Patient goals for therapy: decreased pain, increased motion, increased strength, independence with ADLs/IADLs and return to sport/leisure activities    Pain  No pain reported  Progression: improved (progressing well through protocol with no pain reported at this time w/ AAROM)    Social Support  Lives with: spouse    Hand dominance: right      Diagnostic Tests  X-ray: normal (hardware in place)  Treatments  Previous treatment: physical therapy        Objective    Objective:     A/PROM: R   L  Shoulder flexion (supine) 165A   105A/140AA  Shoulder abduction  170   95/130AA  Shoulder [email or web address]  Mod restrepo  Severe restrepo in B/L ER in sitting  Shoulder IR   TBA   TBA      STRENGTH:    R   L    Shoulder flexion  5/5   3+/5  Shoulder abduction  5/5   3+/5  Shoulder [email or web address]  5/5   3-/5  Shoulder IR   5/5   3+/5    Posture: Pt's sitting posture is WNL     Cervical Screen: cervical AROM limitations  Flexion  WNL  Extension Min restrepo  R rotation WNL  L rotation WNL  R sidebend WNL  L sidebend WNL           Precautions: see protocol   Past Medical History:   Diagnosis Date    Arrhythmia     Chronic kidney disease     COVID 12/2020    CPAP (continuous positive airway pressure) dependence     Diabetes mellitus ([address])     History of echocardiogram 11/14/2017    showed EF of 50-55 percentWith moderate LVH and left ventricle diastolic dysfunction. Left atrium was moderately enlarged. Trace MR noted. History of Holter monitoring 11/21/2017    showed baseline rhythm of sinus origin with an average heart it of 61 bpm. The lowest heart rate was 49 and the highest heart rate was 10 8 bpm. There were rare single VPCs, and frequent PACs representing 3.2% of total beats. There were several episodes of sinus arrhythmias with sinus bradycardia and heart rate ranging from 40-90 bpm. No sustained dysrhythmias, or pauses noted.  The patient did not    History of transfusion 04/2023    platelets    Liver disease     cirhosis    Obese     Sleep apnea      SOC: 9/18/2023  FOTO:   POC Expiration: 1/4/23   Daily Treatment Log:  Date 11/9/2023 11/20/23 11/27/2023 11/30/23     Visit # 16 17 18 19 RE    Manual                There Exer 25' 30' 30' 30'    [place]    3'     UBE 1.5' fwd / 1.5' bck  1.5 fwd/1.5retro  Alt 1' fwd/bck for 4'  Alt 1'fwd/bwd for 4'     Supine AAROM flex w/ cane        Std cane abd AAROM  W/ eccentric return 2x10  W/ eccentric return 2x10  W/ eccentric return 2x10     Standing B/L ER  2x10  2x10  2x10      AROM flex in supine  1# 2x10  1# 2x10    2# 2x10  2# 2x10     AROM S/L abd  1# 2x10  1# 2x10  2# 2x10  2#     Bicep curl  3# 2x10  3# 2x10  3# 2x10  3# 2x10     Wall slides  Uni flex 1x10  1x10 uni flex  3" 1x10 uni flex  3" 2x10     S/L ER 3x5  1# 2x10 1# 2x10  1# 2x10     Standing AROM flex    1x10  1x10     Stadning AROM abd    1x10  1x10     Objective measures         HEP        Ther Activ                                                        NMReed 15' 10 10' 15'    Scap retraction         Scap rows tubing Blue tubing 2x10  Page 8# 2x10  Page 8.5# 2x10  Black 2x10     B shldr ext to hip w/ tube Grn tubing 2x10  Page 5# 2x10  [place] 8.5# 2x10 Blue 2x10     Supine horiz abd/add  1x10        IR/ER walk outs vs tubing  5x ea. Red tubing for IR  Yellow for ER   Page 4# 2x5 ea. YTB for ER 1x10  RTB for IR 1x10                    Modalities                                HEP:   Access Code: [email or web address]  URL: [email or web address]  Date: 11/09/2023  Prepared by: Leia Rojas    Exercises  - Standing Shoulder Abduction ROM with Dowel  - 1 x daily - 7 x weekly - 2 sets - 10 reps  - Shoulder External Rotation and Scapular Retraction  - 1 x daily - 7 x weekly - 2 sets - 10 reps  - Shoulder Flexion Wall Slide with Towel  - 1 x daily - 7 x weekly - 1 sets - 10 reps - 5 sec hold  - Supine Shoulder Flexion Extension AAROM with Dowel  - 1 x daily - 7 x weekly - 2 sets - 10 reps  - Supine Shoulder Flexion Extension Full Range AROM  - 1 x daily - 7 x weekly - 2 sets - 10 reps  - Sidelying Shoulder Abduction Full Range of Motion  - 1 x daily - 7 x weekly - 2 sets - 10 reps  - Sidelying Shoulder External Rotation AROM  - 1 x daily - 7 x weekly - 3 sets - 5 reps

## 2023-12-04 ENCOUNTER — OFFICE VISIT (OUTPATIENT)
Dept: PHYSICAL THERAPY | Facility: CLINIC | Age: 68
End: 2023-12-04
Payer: MEDICARE

## 2023-12-04 DIAGNOSIS — Z47.1 AFTERCARE FOLLOWING LEFT SHOULDER JOINT REPLACEMENT SURGERY: Primary | ICD-10-CM

## 2023-12-04 DIAGNOSIS — Z96.612 AFTERCARE FOLLOWING LEFT SHOULDER JOINT REPLACEMENT SURGERY: Primary | ICD-10-CM

## 2023-12-04 PROCEDURE — 97112 NEUROMUSCULAR REEDUCATION: CPT

## 2023-12-04 NOTE — PROGRESS NOTES
Daily Note     Today's date: 2023  Patient name: Kayla Pereira  : 1955  MRN: [id number]  Referring provider: Fani Callejas PA-C  Dx:   Encounter Diagnosis     ICD-10-CM    1. Aftercare following left shoulder joint replacement surgery  Z47.1     H66.113                      Subjective: Patient reports he has no new complaints at this time. Objective: See treatment diary below      Assessment: Tolerated treatment well. Continues to have limitations in AROM in standing however is improving. Patient reported mild pain with AROM flex in standing that did not linger. Patient demonstrated fatigue post treatment and would benefit from continued PT      Plan: Continue per plan of care. Precautions: see protocol   Past Medical History:   Diagnosis Date    Arrhythmia     Chronic kidney disease     COVID 2020    CPAP (continuous positive airway pressure) dependence     Diabetes mellitus ([address])     History of echocardiogram 2017    showed EF of 50-55 percentWith moderate LVH and left ventricle diastolic dysfunction. Left atrium was moderately enlarged. Trace MR noted. History of Holter monitoring 2017    showed baseline rhythm of sinus origin with an average heart it of 61 bpm. The lowest heart rate was 49 and the highest heart rate was 10 8 bpm. There were rare single VPCs, and frequent PACs representing 3.2% of total beats. There were several episodes of sinus arrhythmias with sinus bradycardia and heart rate ranging from 40-90 bpm. No sustained dysrhythmias, or pauses noted.  The patient did not    History of transfusion 2023    platelets    Liver disease     cirhosis    Obese     Sleep apnea      SOC: 2023  FOTO:   POC Expiration: 23   Daily Treatment Log:  Date 23   Visit # 16 17 18 19 RE 20   Manual                There Exer 25' 30' 30' 30' 30'    [place]    3'     UBE 1.5' fwd / 1.5' bck  1.5 fwd/1.5retro  Alt 1' fwd/bck for 4'  Alt 1'fwd/bwd for 4'  Alt 1'fwd/bwd for 4'    Supine AAROM flex w/ cane        Std cane abd AAROM  W/ eccentric return 2x10  W/ eccentric return 2x10  W/ eccentric return 2x10  W/ eccentric return 2x10    Standing B/L ER  2x10  2x10  2x10   2x10    AROM flex in supine  1# 2x10  1# 2x10    2# 2x10  2# 2x10  2# 2x10    AROM S/L abd  1# 2x10  1# 2x10  2# 2x10  2#  2# 2x10    Bicep curl  3# 2x10  3# 2x10  3# 2x10  3# 2x10  3# 2x10    Wall slides  Uni flex 1x10  1x10 uni flex  3" 1x10 uni flex  3" 2x10  Uni flex/abd 3" 1x10 ea. S/L ER 3x5  1# 2x10 1# 2x10  1# 2x10  1# 1x10    Standing AROM flex    1x10  1x10  1# 1x10    Stadning AROM abd    1x10  1x10  1# 1x10    Objective measures         HEP        Ther Activ                                                        NMReed 15' 10 10' 15' 15'   Scap retraction         Scap rows tubing Blue tubing 2x10  [place] 8# 2x10  Page 8.5# 2x10  Black 2x10  Black 2x10    B shldr ext to hip w/ tube Grn tubing 2x10  Page 5# 2x10  [place] 8.5# 2x10 Blue 2x10  Blue 2x10    Supine horiz abd/add  1x10        IR/ER walk outs vs tubing  5x ea. Red tubing for IR  Yellow for ER   Page 4# 2x5 ea. YTB for ER 1x10  RTB for IR 1x10 YTB for ER 2x10  RTB for IR 2x10                   Modalities                                HEP:   Access Code: [email or web address]  URL: [email or web address]  Date: 11/09/2023  Prepared by: Brianna Goad    Exercises  - Standing Shoulder Abduction ROM with Dowel  - 1 x daily - 7 x weekly - 2 sets - 10 reps  - Shoulder External Rotation and Scapular Retraction  - 1 x daily - 7 x weekly - 2 sets - 10 reps  - Shoulder Flexion Wall Slide with Towel  - 1 x daily - 7 x weekly - 1 sets - 10 reps - 5 sec hold  - Supine Shoulder Flexion Extension AAROM with [place]  - 1 x daily - 7 x weekly - 2 sets - 10 reps  - Supine Shoulder Flexion Extension Full Range AROM  - 1 x daily - 7 x weekly - 2 sets - 10 reps  - Sidelying Shoulder Abduction Full Range of Motion  - 1 x daily - 7 x weekly - 2 sets - 10 reps  - Sidelying Shoulder External Rotation AROM  - 1 x daily - 7 x weekly - 3 sets - 5 reps

## 2023-12-07 ENCOUNTER — OFFICE VISIT (OUTPATIENT)
Dept: PHYSICAL THERAPY | Facility: CLINIC | Age: 68
End: 2023-12-07
Payer: MEDICARE

## 2023-12-07 DIAGNOSIS — Z96.612 AFTERCARE FOLLOWING LEFT SHOULDER JOINT REPLACEMENT SURGERY: Primary | ICD-10-CM

## 2023-12-07 DIAGNOSIS — Z47.1 AFTERCARE FOLLOWING LEFT SHOULDER JOINT REPLACEMENT SURGERY: Primary | ICD-10-CM

## 2023-12-07 PROCEDURE — 97112 NEUROMUSCULAR REEDUCATION: CPT

## 2023-12-07 PROCEDURE — 97110 THERAPEUTIC EXERCISES: CPT

## 2023-12-07 NOTE — PROGRESS NOTES
Daily Note     Today's date: 2023  Patient name: Kaelyn Vences  : 1955  MRN: [id number]  Referring provider: Ade Baker PA-C  Dx:   Encounter Diagnosis     ICD-10-CM    1. Aftercare following left shoulder joint replacement surgery  Z47.1     Z96.612             Start Time: 0845  Stop Time: 0930  Total time in clinic (min): 45 minutes    Subjective: Patient reports he has no new complaints at this time, but notes mild-mod muscle soreness of the anterior R shoulder which he attributes to an increase in resistance recently. Objective: See treatment diary below    Treatment and documentation completed by Codi TIRADO, under direct supervision and review of documentation completed by Kajal NARVAEZT. Assessment: Tolerated treatment well. Continues to have limitations in AROM in standing, most notably abduction, however this is improving. Patient reported mild pain when beginning UEB, but this feeling soon dissipated. Patient demonstrated fatigue post treatment and would benefit from continued PT      Plan: Continue per plan of care. Precautions: see protocol   Past Medical History:   Diagnosis Date    Arrhythmia     Chronic kidney disease     COVID 2020    CPAP (continuous positive airway pressure) dependence     Diabetes mellitus ([address])     History of echocardiogram 2017    showed EF of 50-55 percentWith moderate LVH and left ventricle diastolic dysfunction. Left atrium was moderately enlarged. Trace MR noted. History of Holter monitoring 2017    showed baseline rhythm of sinus origin with an average heart it of 61 bpm. The lowest heart rate was 49 and the highest heart rate was 10 8 bpm. There were rare single VPCs, and frequent PACs representing 3.2% of total beats. There were several episodes of sinus arrhythmias with sinus bradycardia and heart rate ranging from 40-90 bpm. No sustained dysrhythmias, or pauses noted.  The patient did not    History of transfusion 04/2023    platelets    Liver disease     cirhosis    Obese     Sleep apnea      SOC: 9/18/2023  FOTO:   POC Expiration: 1/4/23   Daily Treatment Log:  Date 12/6/23 11/20/23 11/27/2023 11/30/23 12/4/2023   Visit # 21 17 18 19 RE 20   Manual                There Exer 30' 30' 30' 30' 30'    [place]    3'     UBE Alt 1'fwd/bwd for 4'  1.5 fwd/1.5retro  Alt 1' fwd/bck for 4'  Alt 1'fwd/bwd for 4'  Alt 1'fwd/bwd for 4'    Supine AAROM flex w/ cane        Std cane abd AAROM W/ eccentric return 2x10  W/ eccentric return 2x10  W/ eccentric return 2x10  W/ eccentric return 2x10  W/ eccentric return 2x10    Standing B/L ER  2x10  2x10  2x10   2x10    AROM flex in supine  2# 2x10 1# 2x10    2# 2x10  2# 2x10  2# 2x10    AROM S/L abd  2# 2x10  1# 2x10  2# 2x10  2#  2# 2x10    Bicep curl  3# 2x10  3# 2x10  3# 2x10  3# 2x10  3# 2x10    Wall slides   1x10 uni flex  3" 1x10 uni flex  3" 2x10  Uni flex/abd 3" 1x10 ea. S/L ER 1# 1x10  1# 2x10 1# 2x10  1# 2x10  1# 1x10    Standing AROM flex    1x10  1x10  1# 1x10    Stadning AROM abd    1x10  1x10  1# 1x10    Objective measures         HEP        Ther Activ                                                        NMReed 10' 10 10' 15' 15'   Scap retraction         Scap rows tubing Blue 2x10  [place] 8# 2x10  Page 8.5# 2x10  Black 2x10  Black 2x10    B shldr ext to hip w/ tube Blue 2x10  [place] 5# 2x10  Page 8.5# 2x10 Blue 2x10  Blue 2x10    Supine horiz abd/add         IR/ER walk outs vs tubing  YTB for ER 2x10  RTB for IR 2x10  [place] 4# 2x5 ea. YTB for ER 1x10  RTB for IR 1x10 YTB for ER 2x10  RTB for IR 2x10                   Modalities                                HEP:   Access Code: [email or web address]  URL: [email or web address]  Date: 12/07/2023  Prepared by: Iza Royal     Exercises  - Shoulder External Rotation and Scapular Retraction  - 1 x daily - 7 x weekly - 2 sets - 10 reps  - Shoulder Flexion Wall Slide with Towel  - 1 x daily - 7 x weekly - 1 sets - 10 reps - 5 sec hold  - Standing shoulder flexion  - 1 x daily - 7 x weekly - 1 sets - 10 reps  - Shoulder Abduction - Thumbs Up  - 1 x daily - 7 x weekly - 1 sets - 10 reps  - Supine Shoulder Flexion Extension Full Range AROM  - 1 x daily - 7 x weekly - 2 sets - 10 reps  - Sidelying Shoulder Abduction Full Range of Motion  - 1 x daily - 7 x weekly - 2 sets - 10 reps  - Sidelying Shoulder External Rotation AROM  - 1 x daily - 7 x weekly - 1 sets - 10 reps

## 2023-12-12 ENCOUNTER — OFFICE VISIT (OUTPATIENT)
Dept: PHYSICAL THERAPY | Facility: CLINIC | Age: 68
End: 2023-12-12
Payer: MEDICARE

## 2023-12-12 DIAGNOSIS — Z47.1 AFTERCARE FOLLOWING LEFT SHOULDER JOINT REPLACEMENT SURGERY: Primary | ICD-10-CM

## 2023-12-12 DIAGNOSIS — Z96.612 AFTERCARE FOLLOWING LEFT SHOULDER JOINT REPLACEMENT SURGERY: Primary | ICD-10-CM

## 2023-12-12 PROCEDURE — 97110 THERAPEUTIC EXERCISES: CPT

## 2023-12-12 PROCEDURE — 97112 NEUROMUSCULAR REEDUCATION: CPT

## 2023-12-12 NOTE — PROGRESS NOTES
Daily Note     Today's date: 2023  Patient name: Richy Licona  : 1955  MRN: [id number]  Referring provider: Tyrone Trejo PA-C  Dx:   Encounter Diagnosis     ICD-10-CM    1. Aftercare following left shoulder joint replacement surgery  Z47.1     Z96.612                      Subjective: pt reports that he does get some muscle soreness but overall the shoulder feels pretty good. Pt cont to struggle with reaching [facility] but this is more so associated to weakness and not really painful. Objective: See treatment diary below      Assessment: Tolerated treatment well. Pt challenged and fatigued with OH endurance exercises today and cont to lack OH AROM with compensatory movement patterns to achieve maximal ROM. Patient would benefit from continued PT      Plan: Continue per plan of care. Precautions: see protocol   Past Medical History:   Diagnosis Date    Arrhythmia     Chronic kidney disease     COVID 2020    CPAP (continuous positive airway pressure) dependence     Diabetes mellitus ([address])     History of echocardiogram 2017    showed EF of 50-55 percentWith moderate LVH and left ventricle diastolic dysfunction. Left atrium was moderately enlarged. Trace MR noted. History of Holter monitoring 2017    showed baseline rhythm of sinus origin with an average heart it of 61 bpm. The lowest heart rate was 49 and the highest heart rate was 10 8 bpm. There were rare single VPCs, and frequent PACs representing 3.2% of total beats. There were several episodes of sinus arrhythmias with sinus bradycardia and heart rate ranging from 40-90 bpm. No sustained dysrhythmias, or pauses noted.  The patient did not    History of transfusion 2023    platelets    Liver disease     cirhosis    Obese     Sleep apnea      SOC: 2023  FOTO:   POC Expiration: 23   Daily Treatment Log:  Date 23   Visit # 21 22  19 RE 20   Manual                There Exer 30' 30' 30' 30'    [place]    3'     UBE Alt 1'fwd/bwd for 4'  2' fwd/2' retro   Alt 1'fwd/bwd for 4'  Alt 1'fwd/bwd for 4'    Supine AAROM flex w/ cane        Std cane abd AAROM W/ eccentric return 2x10    W/ eccentric return 2x10  W/ eccentric return 2x10    Standing B/L ER  2x10  SL ER 2# 2x10     2x10    AROM flex in supine  2# 2x10 2# 2x10   2# 2x10  2# 2x10    Supine chest press   2# 2x10       AROM S/L abd  2# 2x10  2# 2x10   2#  2# 2x10    Bicep curl  3# 2x10    3# 2x10  3# 2x10    Wall slides   Uni 5"x10 flex/abd   3" 2x10  Uni flex/abd 3" 1x10 ea. S/L ER 1# 1x10    1# 2x10  1# 1x10    Standing AROM flex     1x10  1# 1x10    Stadning AROM abd     1x10  1# 1x10    Objective measures         HEP        Ther Activ        OH pball dribble/catch at wall   2x15       OH carry in available range   1# 25"x2       Ball roll up wall   2x10  1# CW                              NMReed 10' 10'  15' 15'   Scap retraction         Scap rows tubing Blue 2x10  [place] 12# 2x10   Black 2x10  Black 2x10    B shldr ext to hip w/ tube Blue 2x10  [place] 5# 2x10   Blue 2x10  Blue 2x10    Supine horiz abd/add   2# 1x10       IR/ER walk outs vs tubing  YTB for ER 2x10  RTB for IR 2x10   YTB for ER 1x10  RTB for IR 1x10 YTB for ER 2x10  RTB for IR 2x10                   Modalities                                HEP:   Access Code: [email or web address]  URL: [email or web address]  Date: 12/07/2023  Prepared by: Dave Trevino     Exercises  - Shoulder External Rotation and Scapular Retraction  - 1 x daily - 7 x weekly - 2 sets - 10 reps  - Shoulder Flexion Wall Slide with Towel  - 1 x daily - 7 x weekly - 1 sets - 10 reps - 5 sec hold  - Standing shoulder flexion  - 1 x daily - 7 x weekly - 1 sets - 10 reps  - Shoulder Abduction - Thumbs Up  - 1 x daily - 7 x weekly - 1 sets - 10 reps  - Supine Shoulder Flexion Extension Full Range AROM  - 1 x daily - 7 x weekly - 2 sets - 10 reps  - Sidelying Shoulder Abduction Full Range of Motion  - 1 x daily - 7 x weekly - 2 sets - 10 reps  - Sidelying Shoulder External Rotation AROM  - 1 x daily - 7 x weekly - 1 sets - 10 reps

## 2023-12-14 ENCOUNTER — CONSULT (OUTPATIENT)
Dept: HEMATOLOGY ONCOLOGY | Facility: CLINIC | Age: 68
End: 2023-12-14
Payer: MEDICARE

## 2023-12-14 ENCOUNTER — OFFICE VISIT (OUTPATIENT)
Dept: PHYSICAL THERAPY | Facility: CLINIC | Age: 68
End: 2023-12-14
Payer: MEDICARE

## 2023-12-14 VITALS
OXYGEN SATURATION: 98 % | DIASTOLIC BLOOD PRESSURE: 58 MMHG | RESPIRATION RATE: 17 BRPM | HEIGHT: 71 IN | TEMPERATURE: 98 F | SYSTOLIC BLOOD PRESSURE: 150 MMHG | HEART RATE: 92 BPM | BODY MASS INDEX: 35.56 KG/M2 | WEIGHT: 254 LBS

## 2023-12-14 DIAGNOSIS — D61.818 PANCYTOPENIA (HCC): ICD-10-CM

## 2023-12-14 DIAGNOSIS — D52.0 DIETARY FOLATE DEFICIENCY ANEMIA: ICD-10-CM

## 2023-12-14 DIAGNOSIS — Z47.1 AFTERCARE FOLLOWING LEFT SHOULDER JOINT REPLACEMENT SURGERY: Primary | ICD-10-CM

## 2023-12-14 DIAGNOSIS — K76.9 LIVER LESION: Primary | ICD-10-CM

## 2023-12-14 DIAGNOSIS — K70.30 ALCOHOLIC CIRRHOSIS OF LIVER WITHOUT ASCITES (HCC): ICD-10-CM

## 2023-12-14 DIAGNOSIS — R93.2 ABNORMAL FINDINGS ON DIAGNOSTIC IMAGING OF LIVER AND BILIARY TRACT: ICD-10-CM

## 2023-12-14 DIAGNOSIS — Z96.612 AFTERCARE FOLLOWING LEFT SHOULDER JOINT REPLACEMENT SURGERY: Primary | ICD-10-CM

## 2023-12-14 PROCEDURE — 99204 OFFICE O/P NEW MOD 45 MIN: CPT | Performed by: INTERNAL MEDICINE

## 2023-12-14 PROCEDURE — 97110 THERAPEUTIC EXERCISES: CPT

## 2023-12-14 PROCEDURE — 97112 NEUROMUSCULAR REEDUCATION: CPT

## 2023-12-14 NOTE — PROGRESS NOTES
Susannahteodoro Dolores  1955  [facility] [place]  [address] [address] [phone]    Chief Complaint   Patient presents with    Follow-up           Oncology History    No history exists. History of Present Illness:  October 23, 2023 patient had abdominal MRI to investigate  AFP 29.6. MRI showed 3 abnormalities, all of them LR 3. Lean 19 cm. WBC 2.9, hemoglobin 11.1, MCV 85, platelet count 57.  56 neutrophils, 13 lymphocytes, 11 monocytes, 19 eosinophils, 1 basophil. CMP showed AST 86, ALT 91, alk phos 239, total protein 7.5, albumin 3.0, bilirubin 1.1. Review of Systems   Constitutional:  Negative for chills and fever. HENT:  Negative for nosebleeds. Eyes:  Negative for discharge. Respiratory:  Negative for cough and shortness of breath. Cardiovascular:  Negative for chest pain. Gastrointestinal:  Negative for abdominal pain, constipation and diarrhea. Endocrine: Negative for polydipsia. Genitourinary:  Negative for hematuria. Musculoskeletal:  Negative for arthralgias. Skin:  Negative for color change. Allergic/Immunologic: Negative for immunocompromised state. Neurological:  Negative for dizziness and headaches. Hematological:  Negative for adenopathy. Psychiatric/Behavioral:  Negative for agitation.         Patient Active Problem List   Diagnosis    Alcohol abuse    DM2 (diabetes mellitus, type 2) ([address])    Essential hypertension    ABILIO (acute kidney injury) ([address])    Cholelithiasis    Alcoholic cirrhosis (HCC)    MEG (obstructive sleep apnea)    Pancytopenia (HCC)    Thrombocytopenia (HCC)    Insomnia    Elevated troponin    Chest pressure    Diabetic ulcer of left foot associated with type 2 diabetes mellitus (HCC)    Leukopenia    Cellulitis    Obesity, morbid (HCC)    Post-traumatic osteoarthritis of left shoulder    Morbid obesity ([address])    S/P reverse total shoulder arthroplasty, left    Aftercare following left shoulder joint replacement surgery    Ulcer of left foot, limited to breakdown of skin (HCC)    Postoperative infection of surgical wound    Stage 3a chronic kidney disease ([address])    Murmur    Urinary retention    Pyogenic arthritis of left shoulder region [facility])     Past Medical History:   Diagnosis Date    Arrhythmia     Chronic kidney disease     COVID 12/2020    CPAP (continuous positive airway pressure) dependence     Diabetes mellitus ([address])     History of echocardiogram 11/14/2017    showed EF of 50-55 percentWith moderate LVH and left ventricle diastolic dysfunction. Left atrium was moderately enlarged. Trace MR noted. History of Holter monitoring 11/21/2017    showed baseline rhythm of sinus origin with an average heart it of 61 bpm. The lowest heart rate was 49 and the highest heart rate was 10 8 bpm. There were rare single VPCs, and frequent PACs representing 3.2% of total beats. There were several episodes of sinus arrhythmias with sinus bradycardia and heart rate ranging from 40-90 bpm. No sustained dysrhythmias, or pauses noted.  The patient did not    History of transfusion 04/2023    platelets    Liver disease     cirhosis    Obese     Sleep apnea      Past Surgical History:   Procedure Laterality Date    CATARACT EXTRACTION      EYE SURGERY Left 1997    FL GUIDED NEEDLE PLAC BX/ASP/INJ  8/28/2023    HERNIA REPAIR  [id number]    JOINT REPLACEMENT Right     TKR    KNEE ARTHROPLASTY Right 2008    VT ARTHROPLASTY GLENOHUMERAL JOINT TOTAL SHOULDER Left 04/04/2023    Procedure: ARTHROPLASTY SHOULDER REVERSE;  Surgeon: Wagner Metz MD;  Location: [facility];  Service: Orthopedics    VT JARED SHOULDER ARTHRPLSTY HUMERAL&GLENOID COMPNT Left 9/8/2023    Procedure: removal of antibiotic spacer, incision and debridement,  with revision reverse shoulder arthroplasty;  Surgeon: Toney Manzo;  Location:  MAIN OR;  Service: Orthopedics    VT JARED SHOULDER ARTHRPLSTY HUMERAL/GLENOID COMPNT Left 2023    Procedure: ARTHROPLASTY SHOULDER REVISION;  Surgeon: Ioana Washington;  Location: [facility];  Service: Orthopedics    SHOULDER SURGERY Right 2002    WOUND DEBRIDEMENT Left 2023    Procedure: INCISION AND DRAINAGE (I&D) EXTREMITY, vac placement;  Surgeon: Meryle Banana, MD;  Location: [facility];  Service: Orthopedics     Family History   Problem Relation Age of Onset    Diabetes Mother     Supraventricular tachycardia Mother     COPD Father     Heart disease Father     Other Father         Sepsis; related to UTI with complicating cardiac problems    Heart disease Paternal Grandmother      Social History     Socioeconomic History    Marital status: /Civil Union     Spouse name: Not on file    Number of children: 2    Years of education: 16    Highest education level: Some college, no degree   Occupational History    Not on file   Tobacco Use    Smoking status: Former     Current packs/day: 0.00     Average packs/day: 0.5 packs/day for 20.0 years (10.0 ttl pk-yrs)     Types: Cigarettes     Start date:      Quit date:      Years since quittin.9    Smokeless tobacco: Never   Vaping Use    Vaping status: Never Used   Substance and Sexual Activity    Alcohol use: Not Currently     Comment: quit     Drug use: Never    Sexual activity: Not Currently   Other Topics Concern    Not on file   Social History Narrative    · Most recent tobacco use screenin2018      · Do you currently or have you served in the [address] [place] [address] [employer]:   No      · Live alone or with others:   with others      · High blood pressure: Yes      · Exercise level:   Occasional      · Overweight:   Yes      · Obese:    Yes      · Diabetes:   Yes      Social Determinants of Health     Financial Resource Strain: Not on file   Food Insecurity: No Food Insecurity (2023)    Hunger Vital Sign     Worried About Running Out of Food in the Last Year: Never true     Ran Out of Food in the Last Year: Never true   Transportation Needs: No Transportation Needs (9/8/2023)    PRAPARE - Transportation     Lack of Transportation (Medical): No     Lack of Transportation (Non-Medical): No   Physical Activity: Not on file   Stress: Not on file   Social Connections: Not on file   Intimate Partner Violence: Not on file   Housing Stability: Low Risk  (9/8/2023)    Housing Stability Vital Sign     Unable to Pay for Housing in the Last Year: No     Number of Places Lived in the Last Year: 1     Unstable Housing in the Last Year: No       Current Outpatient Medications:     furosemide (LASIX) 20 mg tablet, Take 1 tablet (20 mg total) by mouth every other day (Patient taking differently: Take 20 mg by mouth every morning), Disp: 15 tablet, Rfl: 0    glimepiride (AMARYL) 4 mg tablet, Take 4 mg by mouth 2 (two) times a day, Disp: , Rfl:     Insulin Pen Needle (BD Pen Needle Nayla 2nd Gen) 32G X 4 MM MISC, For use with insulin pen.  Pharmacy may dispense brand covered by insurance., Disp: 100 each, Rfl: 0    Lantus SoloStar 100 units/mL SOPN, Inject 40 Units under the skin daily at bedtime, Disp: , Rfl:     Multiple Vitamin (multivitamin) capsule, Take 1 capsule by mouth daily, Disp: 21 capsule, Rfl: 0    tamsulosin (FLOMAX) 0.4 mg, Take 1 capsule (0.4 mg total) by mouth daily with dinner, Disp: 30 capsule, Rfl: 0    traZODone (DESYREL) 50 mg tablet, Take 50 mg by mouth daily at bedtime bedtime, Disp: , Rfl:     benzonatate (TESSALON) 200 MG capsule, TAKE 1 CAPSULE BY MOUTH 3 TIMES DAILY AS NEEDED for cough (Patient not taking: Reported on 12/14/2023), Disp: 20 capsule, Rfl: 0    polyethylene glycol (GOLYTELY) 4000 mL solution, Take 4,000 mL by mouth once for 1 dose, Disp: 4000 mL, Rfl: 0  Allergies   Allergen Reactions    Sulfa Antibiotics Hives    Daptomycin Other (See Comments)     Possibly contributed to ABILIO on 6/2023 admission      Vitals:    12/14/23 1413   BP: 150/58   Pulse: 92   Resp: 17   Temp: 98 °F (36.7 °C) SpO2: 98%       Physical Exam  Constitutional:       Appearance: He is well-developed. HENT:      Head: Normocephalic and atraumatic. Mouth/Throat:      Mouth: Mucous membranes are moist.   Eyes:      Pupils: Pupils are equal, round, and reactive to light. Cardiovascular:      Rate and Rhythm: Normal rate and regular rhythm. Heart sounds: No murmur heard. Pulmonary:      Breath sounds: Normal breath sounds. No wheezing or rales. Abdominal:      Palpations: Abdomen is soft. Tenderness: There is no abdominal tenderness. Musculoskeletal:         General: No tenderness. Normal range of motion. Cervical back: Neck supple. Lymphadenopathy:      Cervical: No cervical adenopathy. Skin:     Findings: No erythema or rash. Neurological:      Mental Status: He is alert and oriented to person, place, and time. Cranial Nerves: No cranial nerve deficit. Deep Tendon Reflexes: Reflexes are normal and symmetric. Psychiatric:         Behavior: Behavior normal.           Labs:  CBC, Coags, BMP, Mg, Phos     Imaging  XR shoulder 2+ vw left    Result Date: 11/24/2023  Narrative: LEFT SHOULDER INDICATION:   L44.230: Presence of left artificial shoulder joint. COMPARISON: 10/18/2023 VIEWS:  XR SHOULDER 2+ VW LEFT FINDINGS: There is no acute fracture or dislocation. Unremarkable appearance of reverse shoulder arthroplasty. No lytic or blastic osseous lesion. Soft tissues are unremarkable. Impression: Unremarkable appearance of reverse total shoulder arthroplasty. Workstation performed: [id number]     XR foot 3+ views LEFT    Result Date: 11/20/2023  Narrative: LEFT FOOT INDICATION:   Swelling & discoloration - 2nd toe red, purple blister plantar aspect of the 2nd & 3rd toes. COMPARISON: September 13, 2022 VIEWS:  XR FOOT 3+ VW LEFT FINDINGS: There is no acute fracture or dislocation.  Chronic findings of severe 1st metatarsophalangeal joint osteoarthritis, as well as second and third metatarsophalangeal joint subluxation and second through fifth hammertoe deformity. Chronic moderate 2nd through 5th tarsometatarsal joint osteoarthritis. Soft tissues are unremarkable. Impression: No radiographic evidence for acute osteomyelitis. Chronic degenerative changes. Workstation performed: [id number]     I reviewed the above laboratory and imaging data. Discussion/Summary: In summary, this is a 42-year-old male with a history of pancytopenia on and off for several years. aFP was elevated in September 2023. In October MRI showed spleen 19 cm as well as 3 lesions in the liver, LR 3. We reviewed that these abnormalities have the potential to convincingly progress although in a minority of patients. Observation is recommended. Workup for pancytopenia will include autoimmune studies, substrates. 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 I took the liberty of making arrangements to follow-up under hepatology.

## 2023-12-14 NOTE — PROGRESS NOTES
Daily Note     Today's date: 2023  Patient name: Mateusz Franco  : 1955  MRN: [id number]  Referring provider: Krista Kowalski PA-C  Dx:   Encounter Diagnosis     ICD-10-CM    1. Aftercare following left shoulder joint replacement surgery  Z47.1     Z96.612                      Subjective: pt reports that he had minor soreness after last session's additions       Objective: See treatment diary below      Assessment: Tolerated treatment well. No complaints during or post session. Pt cont to struggle with AROM OH but unlikely this ROM will improve vastly, moving functionally with no pain. Cont OH strengthening as tolerated for upcoming DC at the end of the month. Patient would benefit from continued PT      Plan: Continue per plan of care. Precautions: see protocol   Past Medical History:   Diagnosis Date    Arrhythmia     Chronic kidney disease     COVID 2020    CPAP (continuous positive airway pressure) dependence     Diabetes mellitus ([address])     History of echocardiogram 2017    showed EF of 50-55 percentWith moderate LVH and left ventricle diastolic dysfunction. Left atrium was moderately enlarged. Trace MR noted. History of Holter monitoring 2017    showed baseline rhythm of sinus origin with an average heart it of 61 bpm. The lowest heart rate was 49 and the highest heart rate was 10 8 bpm. There were rare single VPCs, and frequent PACs representing 3.2% of total beats. There were several episodes of sinus arrhythmias with sinus bradycardia and heart rate ranging from 40-90 bpm. No sustained dysrhythmias, or pauses noted.  The patient did not    History of transfusion 2023    platelets    Liver disease     cirhosis    Obese     Sleep apnea      SOC: 2023  FOTO:   POC Expiration: 23   Daily Treatment Log:  Date 23   Visit # 21 22 23  20   Manual                There Exer 30' 30' 30'   30'    Pulley        UBE Alt 1'fwd/bwd for 4' 2' fwd/2' retro  2' fwd/2' retro   Alt 1'fwd/bwd for 4'    Supine AAROM flex w/ cane        Std cane abd AAROM W/ eccentric return 2x10     W/ eccentric return 2x10    Standing B/L ER  2x10  SL ER 2# 2x10     2x10    AROM flex in supine  2# 2x10 2# 2x10  2# 2x10  2# 2x10    Supine chest press   2# 2x10  2# 2x10     AROM S/L abd  2# 2x10  2# 2x10  2# 2x10   2# 2x10    Bicep curl  3# 2x10     3# 2x10    Wall slides   Uni 5"x10 flex/abd  Uni 5"x10 flex/abd    Uni flex/abd 3" 1x10 ea. S/L ER 1# 1x10   2# 2x10   1# 1x10    Standing AROM flex      1# 1x10    Stadning AROM abd      1# 1x10    Objective measures         HEP        Ther Activ        OH pball dribble/catch at wall   2x15       OH carry in available range   1# 25"x2       Ball roll up wall   2x10  1# CW 2x10                              NMReed 10' 10' 10'   15'   Scap retraction         Scap rows tubing Blue 2x10  Edroy 12# 2x10  Blue tubing 2x10   Black 2x10    B shldr ext to hip w/ tube Blue 2x10  Page 5# 2x10  Blue tubing 2x10   Blue 2x10    Supine horiz abd/add   2# 1x10  2# 2x10      IR/ER walk outs vs tubing  YTB for ER 2x10  RTB for IR 2x10    YTB for ER 2x10  RTB for IR 2x10                   Modalities                                HEP:   Access Code: [email or web address]  URL: [email or web address]  Date: 12/07/2023  Prepared by: Camilo Settle     Exercises  - Shoulder External Rotation and Scapular Retraction  - 1 x daily - 7 x weekly - 2 sets - 10 reps  - Shoulder Flexion Wall Slide with Towel  - 1 x daily - 7 x weekly - 1 sets - 10 reps - 5 sec hold  - Standing shoulder flexion  - 1 x daily - 7 x weekly - 1 sets - 10 reps  - Shoulder Abduction - Thumbs Up  - 1 x daily - 7 x weekly - 1 sets - 10 reps  - Supine Shoulder Flexion Extension Full Range AROM  - 1 x daily - 7 x weekly - 2 sets - 10 reps  - Sidelying Shoulder Abduction Full Range of Motion  - 1 x daily - 7 x weekly - 2 sets - 10 reps  - Sidelying Shoulder External Rotation AROM - 1 x daily - 7 x weekly - 1 sets - 10 reps

## 2023-12-18 ENCOUNTER — OFFICE VISIT (OUTPATIENT)
Dept: PHYSICAL THERAPY | Facility: CLINIC | Age: 68
End: 2023-12-18
Payer: MEDICARE

## 2023-12-18 ENCOUNTER — NURSE TRIAGE (OUTPATIENT)
Age: 68
End: 2023-12-18

## 2023-12-18 DIAGNOSIS — Z47.1 AFTERCARE FOLLOWING LEFT SHOULDER JOINT REPLACEMENT SURGERY: Primary | ICD-10-CM

## 2023-12-18 DIAGNOSIS — Z96.612 AFTERCARE FOLLOWING LEFT SHOULDER JOINT REPLACEMENT SURGERY: Primary | ICD-10-CM

## 2023-12-18 PROCEDURE — 97112 NEUROMUSCULAR REEDUCATION: CPT

## 2023-12-18 PROCEDURE — 97110 THERAPEUTIC EXERCISES: CPT

## 2023-12-18 NOTE — TELEPHONE ENCOUNTER
"Pt. Has scheduled colonoscopy tomorrow with Dr. De La Garza and wanted to make him aware he has a boil to his perineum that is swollen but has not drained, pt. States it started 5 days ago, has a hx of boils but were never cultured, denies s/s infection or pain at this time           Reason for Disposition   Boil and not improved > 3 days following CARE ADVICE    Answer Assessment - Initial Assessment Questions  1. APPEARANCE of BOIL: \"What does the boil look like?\"       Swollen area   2. LOCATION: \"Where is the boil located?\"       Near perineum   3. NUMBER: \"How many boils are there?\"       1  4. SIZE: \"How big is the boil?\" (e.g., inches, cm; compare to size of a coin or other object)      Size of a quarter  5. ONSET: \"When did the boil start?\"      5 days   6. PAIN: \"Is there any pain?\" If Yes, ask: \"How bad is the pain?\"   (Scale 1-10; or mild, moderate, severe)      0/10  7. FEVER: \"Do you have a fever?\" If Yes, ask: \"What is it, how was it measured, and when did it start?\"       Denies   8. SOURCE: \"Have you been around anyone with boils or other Staph infections?\" \"Have you ever had boils before?\"      Yes he has had several before , unsure if they were ever cultured   9. OTHER SYMPTOMS: \"Do you have any other symptoms?\" (e.g., shaking chills, weakness, rash elsewhere on body)      Denies    Protocols used: Boil (Skin Abscess)-ADULT-OH    "

## 2023-12-18 NOTE — PROGRESS NOTES
Daily Note     Today's date: 2023  Patient name: Isael Avendano  : 1955  MRN: [id number]  Referring provider: Pk Soto PA-C  Dx:   Encounter Diagnosis     ICD-10-CM    1. Aftercare following left shoulder joint replacement surgery  Z47.1     Z96.612                        Subjective: pt reports he is doing ok overall and has no new complaints at this time       Objective: See treatment diary below      Assessment: Tolerated treatment well. Denied pain during and post session. Pt cont to struggle with AROM OH and ER but unlikely this ROM will improve greatly from current measurements. Patient is moving functionally with no pain. Cont OH strengthening as tolerated for upcoming D/C at the end of the month. Patient would benefit from continued PT      Plan: Continue per plan of care.      Precautions: see protocol   Past Medical History:   Diagnosis Date    Arrhythmia     Chronic kidney disease     COVID 2020    CPAP (continuous positive airway pressure) dependence     Diabetes mellitus (HCC)     History of echocardiogram 2017    showed EF of 50-55 percentWith moderate LVH and left ventricle diastolic dysfunction. Left atrium was moderately enlarged. Trace MR noted.     History of Holter monitoring 2017    showed baseline rhythm of sinus origin with an average heart it of 61 bpm. The lowest heart rate was 49 and the highest heart rate was 10 8 bpm. There were rare single VPCs, and frequent PACs representing 3.2% of total beats. There were several episodes of sinus arrhythmias with sinus bradycardia and heart rate ranging from 40-90 bpm. No sustained dysrhythmias, or pauses noted. The patient did not    History of transfusion 2023    platelets    Liver disease     cirhosis    Obese     Sleep apnea      SOC: 2023  FOTO: 2023  POC Expiration: 23   Daily Treatment Log:  Date 23    Visit # 21 22 23 24 (FOTO)     Manual               "  There Exer 30' 30' 30'  30'    Pulley        UBE Alt 1'fwd/bwd for 4'  2' fwd/2' retro  2' fwd/2' retro  2' fwd/2' retro     Std cane abd AAROM W/ eccentric return 2x10        Standing B/L ER  2x10  SL ER 2# 2x10    2x10 (no weight to increase Range)     AROM flex in supine  2# 2x10 2# 2x10  2# 2x10 2# 2x10    Supine chest press   2# 2x10  2# 2x10 2# 2x10     AROM S/L abd  2# 2x10  2# 2x10  2# 2x10  2# 2x10    Bicep curl  3# 2x10        Wall slides   Uni 5\"x10 flex/abd  Uni 5\"x10 flex/abd   Uni 5\"x10 flex/abd      S/L ER 1# 1x10   2# 2x10      Standing AROM flex     1x10    Stadning AROM abd     1x10     Objective measures         HEP        Ther Activ        OH pball dribble/catch at wall   2x15   2x15     OH carry in available range   1# 25\"x2       Ball roll up wall   2x10  1# CW 2x10  1# 2x10                             NMReed 10' 10' 10'  10'     Scap retraction         Scap rows tubing Blue 2x10  [place] 12# 2x10  Blue tubing 2x10  Blue tubing 2x10    B shldr ext to hip w/ tube Blue 2x10  [place]ilton 5# 2x10  Blue tubing 2x10  Blue tubing 2x10    Supine horiz abd/add   2# 1x10  2# 2x10  2# 2x10     IR/ER walk outs vs tubing  YTB for ER 2x10  RTB for IR 2x10                       Modalities                                HEP:   Access Code: [email or web address]  URL: [email or web address]  Date: 12/07/2023  Prepared by: Taarh Martin     Exercises  - Shoulder External Rotation and Scapular Retraction  - 1 x daily - 7 x weekly - 2 sets - 10 reps  - Shoulder Flexion Wall Slide with Towel  - 1 x daily - 7 x weekly - 1 sets - 10 reps - 5 sec hold  - Standing shoulder flexion  - 1 x daily - 7 x weekly - 1 sets - 10 reps  - Shoulder Abduction - Thumbs Up  - 1 x daily - 7 x weekly - 1 sets - 10 reps  - Supine Shoulder Flexion Extension Full Range AROM  - 1 x daily - 7 x weekly - 2 sets - 10 reps  - Sidelying Shoulder Abduction Full Range of Motion  - 1 x daily - 7 x weekly - 2 sets - 10 reps  - Sidelying Shoulder " External Rotation AROM  - 1 x daily - 7 x weekly - 1 sets - 10 reps

## 2023-12-19 ENCOUNTER — ANESTHESIA (EMERGENCY)
Dept: GASTROENTEROLOGY | Facility: HOSPITAL | Age: 68
End: 2023-12-19
Payer: MEDICARE

## 2023-12-19 ENCOUNTER — APPOINTMENT (EMERGENCY)
Dept: CT IMAGING | Facility: HOSPITAL | Age: 68
End: 2023-12-19
Payer: MEDICARE

## 2023-12-19 ENCOUNTER — HOSPITAL ENCOUNTER (INPATIENT)
Facility: HOSPITAL | Age: 68
LOS: 1 days | Discharge: HOME | End: 2023-12-21
Attending: EMERGENCY MEDICINE | Admitting: INTERNAL MEDICINE
Payer: MEDICARE

## 2023-12-19 ENCOUNTER — ANESTHESIA EVENT (EMERGENCY)
Dept: GASTROENTEROLOGY | Facility: HOSPITAL | Age: 68
End: 2023-12-19
Payer: MEDICARE

## 2023-12-19 ENCOUNTER — HOSPITAL ENCOUNTER (OUTPATIENT)
Dept: GASTROENTEROLOGY | Facility: HOSPITAL | Age: 68
Setting detail: OUTPATIENT SURGERY
Discharge: HOME | End: 2023-12-19
Attending: INTERNAL MEDICINE
Payer: MEDICARE

## 2023-12-19 VITALS
TEMPERATURE: 98.7 F | HEART RATE: 84 BPM | HEIGHT: 71 IN | DIASTOLIC BLOOD PRESSURE: 68 MMHG | BODY MASS INDEX: 33.81 KG/M2 | SYSTOLIC BLOOD PRESSURE: 146 MMHG | RESPIRATION RATE: 14 BRPM | WEIGHT: 241.5 LBS | OXYGEN SATURATION: 98 %

## 2023-12-19 DIAGNOSIS — N17.9 ACUTE RENAL FAILURE, UNSPECIFIED ACUTE RENAL FAILURE TYPE (HCC): ICD-10-CM

## 2023-12-19 DIAGNOSIS — Z87.19 HISTORY OF CIRRHOSIS: ICD-10-CM

## 2023-12-19 DIAGNOSIS — D69.6 THROMBOCYTOPENIA (HCC): ICD-10-CM

## 2023-12-19 DIAGNOSIS — Z86.010 HISTORY OF COLON POLYPS: ICD-10-CM

## 2023-12-19 DIAGNOSIS — L02.215 PERINEAL ABSCESS: Primary | ICD-10-CM

## 2023-12-19 DIAGNOSIS — Z87.19 HISTORY OF ESOPHAGEAL VARICES: ICD-10-CM

## 2023-12-19 DIAGNOSIS — E11.9 DIABETES (HCC): ICD-10-CM

## 2023-12-19 LAB
ALBUMIN SERPL BCP-MCNC: 3.1 G/DL (ref 3.5–5)
ALP SERPL-CCNC: 164 U/L (ref 34–104)
ALT SERPL W P-5'-P-CCNC: 39 U/L (ref 7–52)
ANION GAP SERPL CALCULATED.3IONS-SCNC: 5 MMOL/L
ANISOCYTOSIS BLD QL SMEAR: PRESENT
AST SERPL W P-5'-P-CCNC: 57 U/L (ref 13–39)
BASOPHILS # BLD AUTO: 0.02 THOUSANDS/ÂΜL (ref 0–0.1)
BASOPHILS NFR BLD AUTO: 1 % (ref 0–1)
BILIRUB SERPL-MCNC: 1.3 MG/DL (ref 0.2–1)
BUN SERPL-MCNC: 22 MG/DL (ref 5–25)
CALCIUM ALBUM COR SERPL-MCNC: 9.9 MG/DL (ref 8.3–10.1)
CALCIUM SERPL-MCNC: 9.2 MG/DL (ref 8.4–10.2)
CHLORIDE SERPL-SCNC: 100 MMOL/L (ref 96–108)
CO2 SERPL-SCNC: 30 MMOL/L (ref 21–32)
CREAT SERPL-MCNC: 1.02 MG/DL (ref 0.6–1.3)
EOSINOPHIL # BLD AUTO: 0.22 THOUSAND/ÂΜL (ref 0–0.61)
EOSINOPHIL NFR BLD AUTO: 7 % (ref 0–6)
ERYTHROCYTE [DISTWIDTH] IN BLOOD BY AUTOMATED COUNT: 15.4 % (ref 11.6–15.1)
GFR SERPL CREATININE-BSD FRML MDRD: 74 ML/MIN/1.73SQ M
GLUCOSE SERPL-MCNC: 131 MG/DL (ref 65–140)
GLUCOSE SERPL-MCNC: 332 MG/DL (ref 65–140)
HCT VFR BLD AUTO: 32.8 % (ref 36.5–49.3)
HGB BLD-MCNC: 10.3 G/DL (ref 12–17)
IMM GRANULOCYTES # BLD AUTO: 0.01 THOUSAND/UL (ref 0–0.2)
IMM GRANULOCYTES NFR BLD AUTO: 0 % (ref 0–2)
LYMPHOCYTES # BLD AUTO: 0.37 THOUSANDS/ÂΜL (ref 0.6–4.47)
LYMPHOCYTES NFR BLD AUTO: 12 % (ref 14–44)
MCH RBC QN AUTO: 27 PG (ref 26.8–34.3)
MCHC RBC AUTO-ENTMCNC: 31.4 G/DL (ref 31.4–37.4)
MCV RBC AUTO: 86 FL (ref 82–98)
MONOCYTES # BLD AUTO: 0.25 THOUSAND/ÂΜL (ref 0.17–1.22)
MONOCYTES NFR BLD AUTO: 8 % (ref 4–12)
NEUTROPHILS # BLD AUTO: 2.26 THOUSANDS/ÂΜL (ref 1.85–7.62)
NEUTS SEG NFR BLD AUTO: 72 % (ref 43–75)
NRBC BLD AUTO-RTO: 0 /100 WBCS
PLATELET # BLD AUTO: 88 THOUSANDS/UL (ref 149–390)
PLATELET BLD QL SMEAR: ABNORMAL
PMV BLD AUTO: 11.6 FL (ref 8.9–12.7)
POLYCHROMASIA BLD QL SMEAR: PRESENT
POTASSIUM SERPL-SCNC: 4.1 MMOL/L (ref 3.5–5.3)
PROT SERPL-MCNC: 6.6 G/DL (ref 6.4–8.4)
RBC # BLD AUTO: 3.81 MILLION/UL (ref 3.88–5.62)
RBC MORPH BLD: PRESENT
SODIUM SERPL-SCNC: 135 MMOL/L (ref 135–147)
WBC # BLD AUTO: 3.13 THOUSAND/UL (ref 4.31–10.16)

## 2023-12-19 PROCEDURE — 74177 CT ABD & PELVIS W/CONTRAST: CPT

## 2023-12-19 PROCEDURE — 96365 THER/PROPH/DIAG IV INF INIT: CPT

## 2023-12-19 PROCEDURE — 99223 1ST HOSP IP/OBS HIGH 75: CPT | Performed by: SURGERY

## 2023-12-19 PROCEDURE — 96361 HYDRATE IV INFUSION ADD-ON: CPT

## 2023-12-19 PROCEDURE — 82948 REAGENT STRIP/BLOOD GLUCOSE: CPT

## 2023-12-19 PROCEDURE — 43239 EGD BIOPSY SINGLE/MULTIPLE: CPT | Performed by: INTERNAL MEDICINE

## 2023-12-19 PROCEDURE — 80053 COMPREHEN METABOLIC PANEL: CPT | Performed by: EMERGENCY MEDICINE

## 2023-12-19 PROCEDURE — 85025 COMPLETE CBC W/AUTO DIFF WBC: CPT | Performed by: EMERGENCY MEDICINE

## 2023-12-19 PROCEDURE — 96366 THER/PROPH/DIAG IV INF ADDON: CPT

## 2023-12-19 PROCEDURE — 88305 TISSUE EXAM BY PATHOLOGIST: CPT | Performed by: STUDENT IN AN ORGANIZED HEALTH CARE EDUCATION/TRAINING PROGRAM

## 2023-12-19 PROCEDURE — G1004 CDSM NDSC: HCPCS

## 2023-12-19 PROCEDURE — 36415 COLL VENOUS BLD VENIPUNCTURE: CPT | Performed by: EMERGENCY MEDICINE

## 2023-12-19 PROCEDURE — 99285 EMERGENCY DEPT VISIT HI MDM: CPT | Performed by: EMERGENCY MEDICINE

## 2023-12-19 PROCEDURE — 45385 COLONOSCOPY W/LESION REMOVAL: CPT | Performed by: INTERNAL MEDICINE

## 2023-12-19 PROCEDURE — 99285 EMERGENCY DEPT VISIT HI MDM: CPT

## 2023-12-19 PROCEDURE — 96367 TX/PROPH/DG ADDL SEQ IV INF: CPT

## 2023-12-19 PROCEDURE — 84145 PROCALCITONIN (PCT): CPT

## 2023-12-19 RX ORDER — FUROSEMIDE 40 MG/1
20 TABLET ORAL EVERY MORNING
Status: DISCONTINUED | OUTPATIENT
Start: 2023-12-20 | End: 2023-12-21 | Stop reason: HOSPADM

## 2023-12-19 RX ORDER — INSULIN LISPRO 100 [IU]/ML
1-6 INJECTION, SOLUTION INTRAVENOUS; SUBCUTANEOUS
Status: DISCONTINUED | OUTPATIENT
Start: 2023-12-20 | End: 2023-12-21 | Stop reason: HOSPADM

## 2023-12-19 RX ORDER — SODIUM CHLORIDE, SODIUM LACTATE, POTASSIUM CHLORIDE, CALCIUM CHLORIDE 600; 310; 30; 20 MG/100ML; MG/100ML; MG/100ML; MG/100ML
INJECTION, SOLUTION INTRAVENOUS CONTINUOUS PRN
Status: DISCONTINUED | OUTPATIENT
Start: 2023-12-19 | End: 2023-12-19

## 2023-12-19 RX ORDER — BENZONATATE 100 MG/1
200 CAPSULE ORAL 3 TIMES DAILY PRN
Status: DISCONTINUED | OUTPATIENT
Start: 2023-12-19 | End: 2023-12-21 | Stop reason: HOSPADM

## 2023-12-19 RX ORDER — PROPOFOL 10 MG/ML
INJECTION, EMULSION INTRAVENOUS AS NEEDED
Status: DISCONTINUED | OUTPATIENT
Start: 2023-12-19 | End: 2023-12-19

## 2023-12-19 RX ORDER — LIDOCAINE HYDROCHLORIDE 10 MG/ML
INJECTION, SOLUTION EPIDURAL; INFILTRATION; INTRACAUDAL; PERINEURAL AS NEEDED
Status: DISCONTINUED | OUTPATIENT
Start: 2023-12-19 | End: 2023-12-19

## 2023-12-19 RX ORDER — PHENYLEPHRINE HCL IN 0.9% NACL 1 MG/10 ML
SYRINGE (ML) INTRAVENOUS AS NEEDED
Status: DISCONTINUED | OUTPATIENT
Start: 2023-12-19 | End: 2023-12-19

## 2023-12-19 RX ORDER — LIDOCAINE HYDROCHLORIDE AND EPINEPHRINE 10; 10 MG/ML; UG/ML
20 INJECTION, SOLUTION INFILTRATION; PERINEURAL ONCE
Status: COMPLETED | OUTPATIENT
Start: 2023-12-19 | End: 2023-12-19

## 2023-12-19 RX ORDER — GUAIFENESIN/DEXTROMETHORPHAN 100-10MG/5
10 SYRUP ORAL EVERY 4 HOURS PRN
Status: DISCONTINUED | OUTPATIENT
Start: 2023-12-19 | End: 2023-12-21 | Stop reason: HOSPADM

## 2023-12-19 RX ORDER — LORAZEPAM 2 MG/ML
0.5 INJECTION INTRAMUSCULAR ONCE
Status: COMPLETED | OUTPATIENT
Start: 2023-12-19 | End: 2023-12-20

## 2023-12-19 RX ORDER — HYDROMORPHONE HCL/PF 1 MG/ML
0.5 SYRINGE (ML) INJECTION ONCE
Status: COMPLETED | OUTPATIENT
Start: 2023-12-19 | End: 2023-12-20

## 2023-12-19 RX ORDER — TRAZODONE HYDROCHLORIDE 50 MG/1
50 TABLET ORAL
Status: DISCONTINUED | OUTPATIENT
Start: 2023-12-20 | End: 2023-12-21 | Stop reason: HOSPADM

## 2023-12-19 RX ORDER — INSULIN GLARGINE 100 [IU]/ML
40 INJECTION, SOLUTION SUBCUTANEOUS
Status: DISCONTINUED | OUTPATIENT
Start: 2023-12-20 | End: 2023-12-21 | Stop reason: HOSPADM

## 2023-12-19 RX ORDER — ACETAMINOPHEN 325 MG/1
975 TABLET ORAL ONCE
Status: COMPLETED | OUTPATIENT
Start: 2023-12-19 | End: 2023-12-19

## 2023-12-19 RX ORDER — TAMSULOSIN HYDROCHLORIDE 0.4 MG/1
0.4 CAPSULE ORAL
Status: DISCONTINUED | OUTPATIENT
Start: 2023-12-20 | End: 2023-12-21 | Stop reason: HOSPADM

## 2023-12-19 RX ORDER — FUROSEMIDE 40 MG/1
20 TABLET ORAL EVERY MORNING
Status: DISCONTINUED | OUTPATIENT
Start: 2023-12-20 | End: 2023-12-19

## 2023-12-19 RX ADMIN — SODIUM CHLORIDE, SODIUM LACTATE, POTASSIUM CHLORIDE, AND CALCIUM CHLORIDE: .6; .31; .03; .02 INJECTION, SOLUTION INTRAVENOUS at 09:19

## 2023-12-19 RX ADMIN — PROPOFOL 100 MG: 10 INJECTION, EMULSION INTRAVENOUS at 09:53

## 2023-12-19 RX ADMIN — PROPOFOL 100 MCG/KG/MIN: 10 INJECTION, EMULSION INTRAVENOUS at 09:54

## 2023-12-19 RX ADMIN — VANCOMYCIN HYDROCHLORIDE 2000 MG: 10 INJECTION, POWDER, LYOPHILIZED, FOR SOLUTION INTRAVENOUS at 20:12

## 2023-12-19 RX ADMIN — LIDOCAINE HYDROCHLORIDE 50 MG: 10 INJECTION, SOLUTION EPIDURAL; INFILTRATION; INTRACAUDAL; PERINEURAL at 09:53

## 2023-12-19 RX ADMIN — IOHEXOL 100 ML: 350 INJECTION, SOLUTION INTRAVENOUS at 17:44

## 2023-12-19 RX ADMIN — LIDOCAINE HYDROCHLORIDE,EPINEPHRINE BITARTRATE 20 ML: 10; .01 INJECTION, SOLUTION INFILTRATION; PERINEURAL at 22:47

## 2023-12-19 RX ADMIN — Medication 100 MCG: at 10:16

## 2023-12-19 RX ADMIN — CEFEPIME 2000 MG: 2 INJECTION, POWDER, FOR SOLUTION INTRAVENOUS at 19:33

## 2023-12-19 RX ADMIN — ACETAMINOPHEN 975 MG: 325 TABLET, FILM COATED ORAL at 19:33

## 2023-12-19 RX ADMIN — SODIUM CHLORIDE 500 ML: 0.9 INJECTION, SOLUTION INTRAVENOUS at 17:23

## 2023-12-19 NOTE — H&P
History and Physical - [facility]  Isael Avendano 68 y.o. male MRN: [id number]                  HPI: Isael Avendano is a 68 y.o. year old male who presents for history of cirrhosis,      REVIEW OF SYSTEMS: Per the HPI, and otherwise unremarkable.    Historical Information   Past Medical History:   Diagnosis Date    Arrhythmia     Chronic kidney disease     COVID 12/2020    CPAP (continuous positive airway pressure) dependence     Diabetes mellitus (HCC)     History of echocardiogram 11/14/2017    showed EF of 50-55 percentWith moderate LVH and left ventricle diastolic dysfunction. Left atrium was moderately enlarged. Trace MR noted.     History of Holter monitoring 11/21/2017    showed baseline rhythm of sinus origin with an average heart it of 61 bpm. The lowest heart rate was 49 and the highest heart rate was 10 8 bpm. There were rare single VPCs, and frequent PACs representing 3.2% of total beats. There were several episodes of sinus arrhythmias with sinus bradycardia and heart rate ranging from 40-90 bpm. No sustained dysrhythmias, or pauses noted. The patient did not    History of transfusion 04/2023    platelets    Liver disease     cirhosis    Obese     Sleep apnea      Past Surgical History:   Procedure Laterality Date    CATARACT EXTRACTION      EYE SURGERY Left 1997    FL GUIDED NEEDLE PLAC BX/ASP/INJ  8/28/2023    HERNIA REPAIR  [phone]    JOINT REPLACEMENT Right     TKR    KNEE ARTHROPLASTY Right 2008    RI ARTHROPLASTY GLENOHUMERAL JOINT TOTAL SHOULDER Left 04/04/2023    Procedure: ARTHROPLASTY SHOULDER REVERSE;  Surgeon: Marcelo Lester MD;  Location:  MAIN OR;  Service: Orthopedics    RI JARED SHOULDER ARTHRPLSTY HUMERAL&GLENOID COMPNT Left 9/8/2023    Procedure: removal of antibiotic spacer, incision and debridement,  with revision reverse shoulder arthroplasty;  Surgeon: Lita Aguilar;  Location:  MAIN OR;  Service: Orthopedics    RI JARED SHOULDER ARTHRPLSTY  HUMERAL/GLENOID COMPNT Left 2023    Procedure: ARTHROPLASTY SHOULDER REVISION;  Surgeon: Lita Aguilar;  Location: [facility];  Service: Orthopedics    SHOULDER SURGERY Right 2002    WOUND DEBRIDEMENT Left 2023    Procedure: INCISION AND DRAINAGE (I&D) EXTREMITY, vac placement;  Surgeon: Marcelo Lester MD;  Location: [facility];  Service: Orthopedics     Social History   Social History     Substance and Sexual Activity   Alcohol Use Not Currently    Comment: quit      Social History     Substance and Sexual Activity   Drug Use Never     Social History     Tobacco Use   Smoking Status Former    Current packs/day: 0.00    Average packs/day: 0.5 packs/day for 20.0 years (10.0 ttl pk-yrs)    Types: Cigarettes    Start date:     Quit date:     Years since quittin.9   Smokeless Tobacco Never     Family History   Problem Relation Age of Onset    Diabetes Mother     Supraventricular tachycardia Mother     COPD Father     Heart disease Father     Other Father         Sepsis; related to UTI with complicating cardiac problems    Heart disease Paternal Grandmother     Prostate cancer Paternal Grandfather 82       Meds/Allergies       Current Outpatient Medications:     benzonatate (TESSALON) 200 MG capsule    furosemide (LASIX) 20 mg tablet    glimepiride (AMARYL) 4 mg tablet    Lantus SoloStar 100 units/mL SOPN    Multiple Vitamin (multivitamin) capsule    polyethylene glycol (GOLYTELY) 4000 mL solution    tamsulosin (FLOMAX) 0.4 mg    traZODone (DESYREL) 50 mg tablet    Insulin Pen Needle (BD Pen Needle Nayla 2nd Gen) 32G X 4 MM MISC  No current facility-administered medications for this encounter.    Facility-Administered Medications Ordered in Other Encounters:     lactated ringers infusion, , Intravenous, Continuous PRN, New Bag at 23 0919    Allergies   Allergen Reactions    Sulfa Antibiotics Hives    Daptomycin Other (See Comments)     Possibly contributed to [facility] on 2023  "admission        Objective     /65   Pulse 90   Temp 98.6 °F (37 °C) (Tympanic)   Resp 14   Ht 5' 11\" (1.803 m)   Wt 110 kg (241 lb 8 oz)   SpO2 99%   BMI 33.68 kg/m²       PHYSICAL EXAM    Gen: NAD  Head: NCAT  CV: RRR  CHEST: Clear  ABD: soft, NT/ND  EXT: no edema      ASSESSMENT/PLAN:  This is a 68 y.o. year old male here for EGD colonoscopy, and he is stable and optimized for his procedure.        "

## 2023-12-19 NOTE — ANESTHESIA POSTPROCEDURE EVALUATION
Post-Op Assessment Note    CV Status:  Stable  Pain Score: 0    Pain management: adequate       Mental Status:  Alert and awake   Hydration Status:  Euvolemic   PONV Controlled:  Controlled   Airway Patency:  Patent     Post Op Vitals Reviewed: Yes      Staff: CRNA               BP   93/50   Temp   98.6   Pulse  80   Resp   20   SpO2   97%

## 2023-12-20 ENCOUNTER — APPOINTMENT (OUTPATIENT)
Dept: RADIOLOGY | Facility: HOSPITAL | Age: 68
End: 2023-12-20
Payer: MEDICARE

## 2023-12-20 PROBLEM — A41.9 SEPSIS (HCC): Status: ACTIVE | Noted: 2023-12-20

## 2023-12-20 LAB
ALBUMIN SERPL BCP-MCNC: 2.8 G/DL (ref 3.5–5)
ALP SERPL-CCNC: 143 U/L (ref 34–104)
ALT SERPL W P-5'-P-CCNC: 34 U/L (ref 7–52)
ANION GAP SERPL CALCULATED.3IONS-SCNC: 8 MMOL/L
AST SERPL W P-5'-P-CCNC: 49 U/L (ref 13–39)
BACTERIA UR QL AUTO: ABNORMAL /HPF
BILIRUB SERPL-MCNC: 1.58 MG/DL (ref 0.2–1)
BILIRUB UR QL STRIP: NEGATIVE
BUN SERPL-MCNC: 24 MG/DL (ref 5–25)
CALCIUM ALBUM COR SERPL-MCNC: 9.7 MG/DL (ref 8.3–10.1)
CALCIUM SERPL-MCNC: 8.7 MG/DL (ref 8.4–10.2)
CHLORIDE SERPL-SCNC: 106 MMOL/L (ref 96–108)
CLARITY UR: CLEAR
CO2 SERPL-SCNC: 24 MMOL/L (ref 21–32)
COLOR UR: YELLOW
CREAT SERPL-MCNC: 1.15 MG/DL (ref 0.6–1.3)
ERYTHROCYTE [DISTWIDTH] IN BLOOD BY AUTOMATED COUNT: 15.1 % (ref 11.6–15.1)
GFR SERPL CREATININE-BSD FRML MDRD: 64 ML/MIN/1.73SQ M
GLUCOSE SERPL-MCNC: 105 MG/DL (ref 65–140)
GLUCOSE SERPL-MCNC: 121 MG/DL (ref 65–140)
GLUCOSE SERPL-MCNC: 124 MG/DL (ref 65–140)
GLUCOSE SERPL-MCNC: 140 MG/DL (ref 65–140)
GLUCOSE SERPL-MCNC: 77 MG/DL (ref 65–140)
GLUCOSE SERPL-MCNC: 79 MG/DL (ref 65–140)
GLUCOSE UR STRIP-MCNC: NEGATIVE MG/DL
HCT VFR BLD AUTO: 30.1 % (ref 36.5–49.3)
HGB BLD-MCNC: 9.4 G/DL (ref 12–17)
HGB UR QL STRIP.AUTO: NEGATIVE
KETONES UR STRIP-MCNC: NEGATIVE MG/DL
LEUKOCYTE ESTERASE UR QL STRIP: NEGATIVE
MCH RBC QN AUTO: 26.4 PG (ref 26.8–34.3)
MCHC RBC AUTO-ENTMCNC: 31.2 G/DL (ref 31.4–37.4)
MCV RBC AUTO: 85 FL (ref 82–98)
NITRITE UR QL STRIP: NEGATIVE
NON-SQ EPI CELLS URNS QL MICRO: ABNORMAL /HPF
PH UR STRIP.AUTO: 6 [PH]
PLATELET # BLD AUTO: 76 THOUSANDS/UL (ref 149–390)
PMV BLD AUTO: 12.6 FL (ref 8.9–12.7)
POTASSIUM SERPL-SCNC: 3.3 MMOL/L (ref 3.5–5.3)
PROCALCITONIN SERPL-MCNC: 0.31 NG/ML
PROT SERPL-MCNC: 6 G/DL (ref 6.4–8.4)
PROT UR STRIP-MCNC: ABNORMAL MG/DL
RBC # BLD AUTO: 3.56 MILLION/UL (ref 3.88–5.62)
RBC #/AREA URNS AUTO: ABNORMAL /HPF
SODIUM SERPL-SCNC: 138 MMOL/L (ref 135–147)
SP GR UR STRIP.AUTO: 1.03 (ref 1–1.03)
UROBILINOGEN UR STRIP-ACNC: <2 MG/DL
WBC # BLD AUTO: 5.34 THOUSAND/UL (ref 4.31–10.16)
WBC #/AREA URNS AUTO: ABNORMAL /HPF

## 2023-12-20 PROCEDURE — 81001 URINALYSIS AUTO W/SCOPE: CPT

## 2023-12-20 PROCEDURE — 82948 REAGENT STRIP/BLOOD GLUCOSE: CPT

## 2023-12-20 PROCEDURE — 99223 1ST HOSP IP/OBS HIGH 75: CPT | Performed by: INTERNAL MEDICINE

## 2023-12-20 PROCEDURE — 99233 SBSQ HOSP IP/OBS HIGH 50: CPT | Performed by: SURGERY

## 2023-12-20 PROCEDURE — 80053 COMPREHEN METABOLIC PANEL: CPT

## 2023-12-20 PROCEDURE — 87040 BLOOD CULTURE FOR BACTERIA: CPT

## 2023-12-20 PROCEDURE — 85027 COMPLETE CBC AUTOMATED: CPT

## 2023-12-20 PROCEDURE — 87081 CULTURE SCREEN ONLY: CPT

## 2023-12-20 PROCEDURE — 71045 X-RAY EXAM CHEST 1 VIEW: CPT

## 2023-12-20 PROCEDURE — 87147 CULTURE TYPE IMMUNOLOGIC: CPT

## 2023-12-20 PROCEDURE — 0H99XZZ DRAINAGE OF PERINEUM SKIN, EXTERNAL APPROACH: ICD-10-PCS | Performed by: SURGERY

## 2023-12-20 RX ORDER — OXYCODONE HYDROCHLORIDE 5 MG/1
5 TABLET ORAL EVERY 4 HOURS PRN
Status: DISCONTINUED | OUTPATIENT
Start: 2023-12-20 | End: 2023-12-21 | Stop reason: HOSPADM

## 2023-12-20 RX ORDER — SODIUM CHLORIDE 9 MG/ML
100 INJECTION, SOLUTION INTRAVENOUS CONTINUOUS
Status: DISPENSED | OUTPATIENT
Start: 2023-12-20 | End: 2023-12-20

## 2023-12-20 RX ORDER — VANCOMYCIN HYDROCHLORIDE 1 G/200ML
1000 INJECTION, SOLUTION INTRAVENOUS EVERY 12 HOURS
Status: DISCONTINUED | OUTPATIENT
Start: 2023-12-20 | End: 2023-12-20

## 2023-12-20 RX ORDER — ACETAMINOPHEN 325 MG/1
650 TABLET ORAL EVERY 4 HOURS PRN
Status: DISCONTINUED | OUTPATIENT
Start: 2023-12-20 | End: 2023-12-20

## 2023-12-20 RX ORDER — POTASSIUM CHLORIDE 20 MEQ/1
40 TABLET, EXTENDED RELEASE ORAL ONCE
Status: COMPLETED | OUTPATIENT
Start: 2023-12-20 | End: 2023-12-20

## 2023-12-20 RX ORDER — VANCOMYCIN HYDROCHLORIDE 1 G/200ML
1000 INJECTION, SOLUTION INTRAVENOUS ONCE
Status: DISCONTINUED | OUTPATIENT
Start: 2023-12-20 | End: 2023-12-20 | Stop reason: DRUGHIGH

## 2023-12-20 RX ORDER — HYDROMORPHONE HCL IN WATER/PF 6 MG/30 ML
0.2 PATIENT CONTROLLED ANALGESIA SYRINGE INTRAVENOUS EVERY 4 HOURS PRN
Status: DISCONTINUED | OUTPATIENT
Start: 2023-12-20 | End: 2023-12-21 | Stop reason: HOSPADM

## 2023-12-20 RX ORDER — ENOXAPARIN SODIUM 100 MG/ML
40 INJECTION SUBCUTANEOUS DAILY
Status: DISCONTINUED | OUTPATIENT
Start: 2023-12-20 | End: 2023-12-21 | Stop reason: HOSPADM

## 2023-12-20 RX ORDER — ONDANSETRON 2 MG/ML
4 INJECTION INTRAMUSCULAR; INTRAVENOUS EVERY 6 HOURS PRN
Status: DISCONTINUED | OUTPATIENT
Start: 2023-12-20 | End: 2023-12-21 | Stop reason: HOSPADM

## 2023-12-20 RX ORDER — ACETAMINOPHEN 325 MG/1
650 TABLET ORAL EVERY 6 HOURS PRN
Status: DISCONTINUED | OUTPATIENT
Start: 2023-12-20 | End: 2023-12-21 | Stop reason: HOSPADM

## 2023-12-20 RX ORDER — POTASSIUM CHLORIDE 14.9 MG/ML
20 INJECTION INTRAVENOUS ONCE
Status: DISCONTINUED | OUTPATIENT
Start: 2023-12-20 | End: 2023-12-20

## 2023-12-20 RX ADMIN — GUAIFENESIN AND DEXTROMETHORPHAN 10 ML: 100; 10 SYRUP ORAL at 22:43

## 2023-12-20 RX ADMIN — INSULIN GLARGINE 40 UNITS: 100 INJECTION, SOLUTION SUBCUTANEOUS at 21:26

## 2023-12-20 RX ADMIN — PIPERACILLIN AND TAZOBACTAM 3.38 G: 36; 4.5 INJECTION, POWDER, FOR SOLUTION INTRAVENOUS at 00:51

## 2023-12-20 RX ADMIN — VANCOMYCIN HYDROCHLORIDE 1750 MG: 10 INJECTION, POWDER, LYOPHILIZED, FOR SOLUTION INTRAVENOUS at 07:52

## 2023-12-20 RX ADMIN — GUAIFENESIN AND DEXTROMETHORPHAN 10 ML: 100; 10 SYRUP ORAL at 12:19

## 2023-12-20 RX ADMIN — ACETAMINOPHEN 650 MG: 325 TABLET, FILM COATED ORAL at 00:31

## 2023-12-20 RX ADMIN — PIPERACILLIN AND TAZOBACTAM 3.38 G: 36; 4.5 INJECTION, POWDER, FOR SOLUTION INTRAVENOUS at 12:10

## 2023-12-20 RX ADMIN — B-COMPLEX W/ C & FOLIC ACID TAB 1 TABLET: TAB at 07:59

## 2023-12-20 RX ADMIN — TAMSULOSIN HYDROCHLORIDE 0.4 MG: 0.4 CAPSULE ORAL at 17:16

## 2023-12-20 RX ADMIN — PIPERACILLIN AND TAZOBACTAM 3.38 G: 36; 4.5 INJECTION, POWDER, FOR SOLUTION INTRAVENOUS at 18:59

## 2023-12-20 RX ADMIN — TRAZODONE HYDROCHLORIDE 50 MG: 50 TABLET ORAL at 21:26

## 2023-12-20 RX ADMIN — TRAZODONE HYDROCHLORIDE 50 MG: 50 TABLET ORAL at 00:31

## 2023-12-20 RX ADMIN — SODIUM CHLORIDE 100 ML/HR: 0.9 INJECTION, SOLUTION INTRAVENOUS at 00:43

## 2023-12-20 RX ADMIN — ENOXAPARIN SODIUM 40 MG: 40 INJECTION SUBCUTANEOUS at 07:59

## 2023-12-20 RX ADMIN — PIPERACILLIN AND TAZOBACTAM 3.38 G: 36; 4.5 INJECTION, POWDER, FOR SOLUTION INTRAVENOUS at 06:04

## 2023-12-20 RX ADMIN — FUROSEMIDE 20 MG: 40 TABLET ORAL at 00:31

## 2023-12-20 RX ADMIN — POTASSIUM CHLORIDE 40 MEQ: 1500 TABLET, EXTENDED RELEASE ORAL at 09:20

## 2023-12-20 RX ADMIN — GUAIFENESIN AND DEXTROMETHORPHAN 10 ML: 100; 10 SYRUP ORAL at 00:31

## 2023-12-20 RX ADMIN — LORAZEPAM 0.5 MG: 2 INJECTION INTRAMUSCULAR; INTRAVENOUS at 00:34

## 2023-12-20 RX ADMIN — HYDROMORPHONE HYDROCHLORIDE 0.5 MG: 1 INJECTION, SOLUTION INTRAMUSCULAR; INTRAVENOUS; SUBCUTANEOUS at 00:32

## 2023-12-20 NOTE — ED PROVIDER NOTES
"History  Chief Complaint   Patient presents with    Wound Check     Pt had colonoscopy this morning, referred by provider to have \"boil in peroneal area\" looked at. Pt denies any other complaints       Wound Check         68-year-old male, past medical history recurrent abscesses, diabetes, presenting with tenderness, induration to his perineum times days.  Patient denies fever, chills, or any other issue per ROS.  No direct to the emergency department by the gastroenterologist that evaluated the likely abscesses a.m.    Prior to Admission Medications   Prescriptions Last Dose Informant Patient Reported? Taking?   Insulin Pen Needle (BD Pen Needle Nayla 2nd Gen) 32G X 4 MM MISC  Self No No   Sig: For use with insulin pen. Pharmacy may dispense brand covered by insurance.   Lantus SoloStar 100 units/mL SOPN  Self Yes No   Sig: Inject 40 Units under the skin daily at bedtime   Multiple Vitamin (multivitamin) capsule  Self No No   Sig: Take 1 capsule by mouth daily   benzonatate (TESSALON) 200 MG capsule  Self No No   Sig: TAKE 1 CAPSULE BY MOUTH 3 TIMES DAILY AS NEEDED for cough   furosemide (LASIX) 20 mg tablet  Self No No   Sig: Take 1 tablet (20 mg total) by mouth every other day   Patient taking differently: Take 20 mg by mouth every morning   glimepiride (AMARYL) 4 mg tablet  Self Yes No   Sig: Take 4 mg by mouth 2 (two) times a day   polyethylene glycol (GOLYTELY) 4000 mL solution   No No   Sig: Take 4,000 mL by mouth once for 1 dose   tamsulosin (FLOMAX) 0.4 mg  Self No No   Sig: Take 1 capsule (0.4 mg total) by mouth daily with dinner   traZODone (DESYREL) 50 mg tablet  Self Yes No   Sig: Take 50 mg by mouth daily at bedtime bedtime      Facility-Administered Medications: None       Past Medical History:   Diagnosis Date    Arrhythmia     Chronic kidney disease     COVID 12/2020    CPAP (continuous positive airway pressure) dependence     Diabetes mellitus (HCC)     History of echocardiogram 11/14/2017    " showed EF of 50-55 percentWith moderate LVH and left ventricle diastolic dysfunction. Left atrium was moderately enlarged. Trace MR noted.     History of Holter monitoring 11/21/2017    showed baseline rhythm of sinus origin with an average heart it of 61 bpm. The lowest heart rate was 49 and the highest heart rate was 10 8 bpm. There were rare single VPCs, and frequent PACs representing 3.2% of total beats. There were several episodes of sinus arrhythmias with sinus bradycardia and heart rate ranging from 40-90 bpm. No sustained dysrhythmias, or pauses noted. The patient did not    History of transfusion 04/2023    platelets    Liver disease     cirhosis    Obese     Sleep apnea        Past Surgical History:   Procedure Laterality Date    CATARACT EXTRACTION      EYE SURGERY Left 1997    FL GUIDED NEEDLE PLAC BX/ASP/INJ  8/28/2023    HERNIA REPAIR  [id number]    JOINT REPLACEMENT Right     TKR    KNEE ARTHROPLASTY Right 2008    NE ARTHROPLASTY GLENOHUMERAL JOINT TOTAL SHOULDER Left 04/04/2023    Procedure: ARTHROPLASTY SHOULDER REVERSE;  Surgeon: Marcelo Lester MD;  Location: [facility];  Service: Orthopedics    NE JARED SHOULDER ARTHRPLSTY HUMERAL&GLENOID COMPNT Left 9/8/2023    Procedure: removal of antibiotic spacer, incision and debridement,  with revision reverse shoulder arthroplasty;  Surgeon: Lita Aguilar;  Location: [facility];  Service: Orthopedics    NE JARED SHOULDER ARTHRPLSTY HUMERAL/GLENOID COMPNT Left 06/13/2023    Procedure: ARTHROPLASTY SHOULDER REVISION;  Surgeon: Lita Aguilar;  Location: [facility];  Service: Orthopedics    SHOULDER SURGERY Right 2002    WOUND DEBRIDEMENT Left 05/02/2023    Procedure: INCISION AND DRAINAGE (I&D) EXTREMITY, vac placement;  Surgeon: Marcelo Lester MD;  Location: [facility];  Service: Orthopedics       Family History   Problem Relation Age of Onset    Diabetes Mother     Supraventricular tachycardia Mother     COPD Father     Heart disease Father      Other Father         Sepsis; related to UTI with complicating cardiac problems    Heart disease Paternal Grandmother     Prostate cancer Paternal Grandfather 82     I have reviewed and agree with the history as documented.    E-Cigarette/Vaping    E-Cigarette Use Never User      E-Cigarette/Vaping Substances    Nicotine No     THC No     CBD No     Flavoring No     Other No     Unknown No      Social History     Tobacco Use    Smoking status: Former     Current packs/day: 0.00     Average packs/day: 0.5 packs/day for 20.0 years (10.0 ttl pk-yrs)     Types: Cigarettes     Start date:      Quit date:      Years since quittin.9    Smokeless tobacco: Never   Vaping Use    Vaping status: Never Used   Substance Use Topics    Alcohol use: Not Currently     Comment: quit     Drug use: Never       Review of Systems   Constitutional:  Negative for chills and fever.   HENT:  Negative for ear pain and sore throat.    Eyes:  Negative for pain and visual disturbance.   Respiratory:  Negative for cough and shortness of breath.    Cardiovascular:  Negative for chest pain and palpitations.   Gastrointestinal:  Negative for abdominal pain and vomiting.   Genitourinary:  Negative for dysuria and hematuria.   Musculoskeletal:  Negative for arthralgias and back pain.   Skin:  Positive for wound. Negative for color change and rash.   Neurological:  Negative for seizures and syncope.   All other systems reviewed and are negative.      Physical Exam  Physical Exam  Vitals and nursing note reviewed.   Constitutional:       General: He is not in acute distress.     Appearance: He is well-developed.   HENT:      Head: Normocephalic and atraumatic.   Eyes:      Conjunctiva/sclera: Conjunctivae normal.   Cardiovascular:      Rate and Rhythm: Normal rate and regular rhythm.      Heart sounds: No murmur heard.  Pulmonary:      Effort: Pulmonary effort is normal. No respiratory distress.      Breath sounds: Normal breath  sounds.   Abdominal:      Palpations: Abdomen is soft.      Tenderness: There is no abdominal tenderness.   Genitourinary:     Comments: RN chaperone at bedside.    Penis and scrotum without abnormality.  Perineum with induration and swelling approximately over span of 6 cm long and 2 cm wide with focal central area that is raised concerning for central abscess.  Erythema overlying.  Tenderness over the after mentioned area.  Musculoskeletal:         General: No swelling.      Cervical back: Neck supple.   Skin:     General: Skin is warm and dry.      Capillary Refill: Capillary refill takes less than 2 seconds.   Neurological:      Mental Status: He is alert.   Psychiatric:         Mood and Affect: Mood normal.         Vital Signs  ED Triage Vitals   Temperature Pulse Respirations Blood Pressure SpO2   12/19/23 1346 12/19/23 1346 12/19/23 1346 12/19/23 1346 12/19/23 1346   98.8 °F (37.1 °C) 92 16 133/64 100 %      Temp src Heart Rate Source Patient Position - Orthostatic VS BP Location FiO2 (%)   -- 12/19/23 1630 -- -- --    Monitor         Pain Score       12/19/23 1933       No Pain           Vitals:    12/19/23 1830 12/19/23 2000 12/19/23 2100 12/19/23 2200   BP: 150/67 145/70 130/61 130/63   Pulse: 83 73 75 82         Visual Acuity      ED Medications  Medications   sodium chloride 0.9 % bolus 500 mL (0 mL Intravenous Stopped 12/19/23 1852)   iohexol (OMNIPAQUE) 350 MG/ML injection (MULTI-DOSE) 100 mL (100 mL Intravenous Given 12/19/23 1744)   cefepime (MAXIPIME) 2 g/50 mL dextrose IVPB (0 mg Intravenous Stopped 12/19/23 2012)   acetaminophen (TYLENOL) tablet 975 mg (975 mg Oral Given 12/19/23 1933)   vancomycin (VANCOCIN) 2,000 mg in sodium chloride 0.9 % 500 mL IVPB (2,000 mg Intravenous New Bag 12/19/23 2012)       Diagnostic Studies  Results Reviewed       Procedure Component Value Units Date/Time    CBC and differential [[id number]]  (Abnormal) Collected: 12/19/23 1702    Lab Status: Final result Specimen:  Blood from Arm, Right Updated: 12/19/23 1801     WBC 3.13 Thousand/uL      RBC 3.81 Million/uL      Hemoglobin 10.3 g/dL      Hematocrit 32.8 %      MCV 86 fL      MCH 27.0 pg      MCHC 31.4 g/dL      RDW 15.4 %      MPV 11.6 fL      Platelets 88 Thousands/uL      nRBC 0 /100 WBCs      Neutrophils Relative 72 %      Immat GRANS % 0 %      Lymphocytes Relative 12 %      Monocytes Relative 8 %      Eosinophils Relative 7 %      Basophils Relative 1 %      Neutrophils Absolute 2.26 Thousands/µL      Immature Grans Absolute 0.01 Thousand/uL      Lymphocytes Absolute 0.37 Thousands/µL      Monocytes Absolute 0.25 Thousand/µL      Eosinophils Absolute 0.22 Thousand/µL      Basophils Absolute 0.02 Thousands/µL     Narrative:      This is an appended report.  These results have been appended to a previously verified report.    Smear Review(Phlebs Do Not Order) [[id number]]  (Abnormal) Collected: 12/19/23 1702    Lab Status: Final result Specimen: Blood from Arm, Right Updated: 12/19/23 1801     RBC Morphology Present     Platelet Estimate Decreased     Anisocytosis Present     Polychromasia Present    Comprehensive metabolic panel [[id number]]  (Abnormal) Collected: 12/19/23 1550    Lab Status: Final result Specimen: Blood from Arm, Right Updated: 12/19/23 1624     Sodium 135 mmol/L      Potassium 4.1 mmol/L      Chloride 100 mmol/L      CO2 30 mmol/L      ANION GAP 5 mmol/L      BUN 22 mg/dL      Creatinine 1.02 mg/dL      Glucose 332 mg/dL      Calcium 9.2 mg/dL      Corrected Calcium 9.9 mg/dL      AST 57 U/L      ALT 39 U/L      Alkaline Phosphatase 164 U/L      Total Protein 6.6 g/dL      Albumin 3.1 g/dL      Total Bilirubin 1.30 mg/dL      eGFR 74 ml/min/1.73sq m     Narrative:      [employer] guidelines for Chronic Kidney Disease (CKD):     Stage 1 with normal or high GFR (GFR > 90 mL/min/1.73 square meters)    Stage 2 Mild CKD (GFR = 60-89 mL/min/1.73 square meters)    Stage 3A Moderate CKD  (GFR = 45-59 mL/min/1.73 square meters)    Stage 3B Moderate CKD (GFR = 30-44 mL/min/1.73 square meters)    Stage 4 Severe CKD (GFR = 15-29 mL/min/1.73 square meters)    Stage 5 End Stage CKD (GFR <15 mL/min/1.73 square meters)  Note: GFR calculation is accurate only with a steady state creatinine                   CT abdomen pelvis with contrast   Final Result by Navin Gamble MD (12/19 1841)      1.  Perineal inflammation with possible 1.7 cm superficial abscess without internal air.   2.  Inflammation surrounding the inferior mesenteric artery has increased since June 20, 2023, and is of uncertain etiology and significance.   3.  Hepatic cirrhosis with findings of portal hypertension. No ascites.      The study was marked in EPIC for immediate notification.      Workstation performed: [id number]                    Procedures  Procedures         ED Course  ED Course as of 12/19/23 2235   Tue Dec 19, 2023   1937 Text placed to surgery for evaluation     2042 General surgery paged for review of case.    2219 Gen surg to perform I and D. No platelets at this time, per them. Request placement to hospital medicine.                                              Medical Decision Making  68-year-old male presenting with central abscess to the perineum with likely surrounding cellulitis but less likely Mansi's.  CBC difficult to interpret given patient's ongoing pancytopenia however your baseline.  CMP with hyperglycemia.  CT scan performed and with likely abscess identified however with surrounding swelling.  Given location, thrombocytopenia, and patient's diabetes - call placed to surgery to assess for intervention.  They presented to evaluate the patient and will likely perform a bedside with our as backup.  They requested admission to hospital medicine at this time.  Call placed to hospital medicine who accepted their care.  Vancomycin and cefepime provided in the emergency department.    Problems  Addressed:  Diabetes (HCC): chronic illness or injury  Perineal abscess: acute illness or injury  Thrombocytopenia (HCC): chronic illness or injury    Amount and/or Complexity of Data Reviewed  Independent Historian: spouse  External Data Reviewed: notes.  Labs: ordered. Decision-making details documented in ED Course.  Radiology: ordered.    Risk  OTC drugs.  Prescription drug management.  Decision regarding hospitalization.             Disposition  Final diagnoses:   Perineal abscess   Thrombocytopenia (HCC)   Diabetes (HCC)     Time reflects when diagnosis was documented in both MDM as applicable and the Disposition within this note       Time User Action Codes Description Comment    12/19/2023  7:33 PM Mike Christian [L02.215] Perineal abscess     12/19/2023  9:29 PM Mike Christian [D69.6] Thrombocytopenia (HCC)     12/19/2023  9:29 PM Mike Christian [E11.9] Diabetes (HCC)           ED Disposition       None          Follow-up Information    None         Patient's Medications   Discharge Prescriptions    No medications on file       No discharge procedures on file.    PDMP Review         Value Time User    PDMP Reviewed  Yes 6/20/2023  2:51 AM Dirk Castellanos MD            ED Provider  Electronically Signed by             Mike Christian DO  12/19/23 5013

## 2023-12-20 NOTE — ASSESSMENT & PLAN NOTE
History of hypertension however not on any antihypertensives be on Lasix 20 mg daily  Patient reports blood pressures have been acceptable at home.  Continue Lasix 20 mg daily  Monitor blood pressures daily

## 2023-12-20 NOTE — PLAN OF CARE
Problem: INFECTION - ADULT  Goal: Absence or prevention of progression during hospitalization  Description: INTERVENTIONS:  - Assess and monitor for signs and symptoms of infection  - Monitor lab/diagnostic results  - Administer medications as ordered  - Instruct and encourage patient and family to use good hand hygiene technique  - Identify and instruct in appropriate isolation precautions for identified infection/condition  Outcome: Progressing

## 2023-12-20 NOTE — PROGRESS NOTES
Progress Note - General Surgery   Isael Avendano 68 y.o. male MRN: [id number]  Unit/Bed#: -01 Encounter: [id number]    Assessment:  68 y.o. male with perineal abscess, s/p 12/20 I&D bedside    Plan:  - Diet as tolerated  - Daily packing changes,   - Continue abx: on vanc and zosyn  - Transition to oral abx and pain regimen  - Pain and nausea control PRN  - Encourage IS, ambulation   - DVT ppx     Subjective:  I&D overnight with purulent and serosanguinous drainage expressed. Reports improved pain this morning. Patient denies nausea, vomiting, fever, chills, chest pain, shortness of breath. Slept without issue overnight. Cough has resolved.     Objective:    Vitals:   Afebrile, Normal VS on room air.  Temp:  [98.6 °F (37 °C)-100.5 °F (38.1 °C)] 98.7 °F (37.1 °C)  HR:  [] 92  Resp:  [14-18] 18  BP: ()/(50-70) 109/53  There is no height or weight on file to calculate BMI.    Physical Exam:   Gen: NAD, Resting in bed  Neuro: A&O, No focal deficits  Head: Normal Cephalic, Atraumatic  Eye: EOMI, No scleral icterus  CV: Regular rate  Pulm: Normal work of breathing, No respiratory distress on RA  Abd: Soft, Non-Distended, Non-Tender  Ext: No edema bilateral lower extremities, Non-tender  Skin: Warm, Dry, Intact  I&D site with serosanguinous drainage. No purulence appreciated.     I/O:  Unrecorded; voiding     Intake/Output Summary (Last 24 hours) at 12/20/2023 0913  Last data filed at 12/19/2023 2212  Gross per 24 hour   Intake 1000 ml   Output --   Net 1000 ml       Lab Results:    Recent Labs     12/19/23  1550 12/19/23  1702 12/20/23  0437   WBC  --  3.13* 5.34   HGB  --  10.3* 9.4*   PLT  --  88* 76*   SODIUM 135  --  138   K 4.1  --  3.3*     --  106   CO2 30  --  24   BUN 22  --  24   CREATININE 1.02  --  1.15   GLUC 332*  --  77   CALCIUM 9.2  --  8.7   AST 57*  --  49*   ALT 39  --  34   ALKPHOS 164*  --  143*   TBILI 1.30*  --  1.58*       ---    Caitlin Murcia MD  General Surgery  PGY-I

## 2023-12-20 NOTE — ASSESSMENT & PLAN NOTE
History of hypertension however not on any antihypertensives be on Lasix 20 mg daily  Continue patient's home dose Lasix 20 mg

## 2023-12-20 NOTE — ASSESSMENT & PLAN NOTE
History of chronic pancytopenia of unknown etiology.  Suspect partial contribution from alcoholic cirrhosis in regard to thrombocytopenia.  He is seeing outpatient hematology oncology and has been scheduled for MRI abdomen.  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  Continue outpatient work-up

## 2023-12-20 NOTE — PROGRESS NOTES
Isael Avendano is a 68 y.o. male who is currently ordered Vancomycin IV with management by the Pharmacy Consult service.  Relevant clinical data and objective / subjective history reviewed.  Vancomycin Assessment:  Indication and Goal AUC/Trough: Soft tissue (goal -600, trough >10)  Clinical Status: stable  Micro:     Renal Function:  SCr: 1.02 mg/dL  CrCl: 64.1 mL/min  Renal replacement: Not on dialysis  Days of Therapy: 1  Current Dose:    Vancomycin Plan:  New Dosing:    Estimated AUC: 485 mcg*hr/mL  Estimated Trough: 16.2 mcg/mL  Next Level: 12/22 0600  Renal Function Monitoring: Daily BMP and UOP  Pharmacy will continue to follow closely for s/sx of nephrotoxicity, infusion reactions and appropriateness of therapy.  BMP and CBC will be ordered per protocol. We will continue to follow the patient’s culture results and clinical progress daily.    Alvarado Erickson, Pharmacist

## 2023-12-20 NOTE — ASSESSMENT & PLAN NOTE
Lab Results   Component Value Date    HGBA1C 7.3 (H) 08/18/2023       Recent Labs     12/19/23  0841 12/20/23  0039   POCGLU 131 105         Blood Sugar Average: Last 72 hrs:  (P) 105  Plan:  Continue patient's home dose Lantus 40 units and glimepiride

## 2023-12-20 NOTE — PROGRESS NOTES
Isael Avendano is a 68 y.o. male who is currently ordered Vancomycin IV with management by the Pharmacy Consult service.  Relevant clinical data and objective / subjective history reviewed.  Vancomycin Assessment:  Indication and Goal AUC/Trough: Soft tissue (goal -600, trough >10)  Clinical Status: new  Micro: no cultures collected yet  Renal Function:  SCr: 1.15 mg/dL  CrCl: 75.2 mL/min  Renal replacement: Not on dialysis  Days of Therapy: 1  Current Dose:  2000 mg IV loading dose  Vancomycin Plan:  New Dosin mg IV every 24 hours  Estimated AUC: 469 mcg*hr/mL  Estimated Trough: 13.5 mcg/mL  Next Level:  at 0600  Renal Function Monitoring: Daily BMP and UOP  Pharmacy will continue to follow closely for s/sx of nephrotoxicity, infusion reactions and appropriateness of therapy.  BMP and CBC will be ordered per protocol. We will continue to follow the patient’s culture results and clinical progress daily.    Josefina Estrella, Pharmacist

## 2023-12-20 NOTE — H&P
[facility]  H&P  Name: Isael Avendano 68 y.o. male I MRN: [id number]  Unit/Bed#: -01 I Date of Admission: 12/19/2023   Date of Service: 12/20/2023 I Hospital Day: 0      Assessment/Plan   * Perineal abscess  Assessment & Plan  Patient presenting with perineal swelling and tenderness that started five days ago.   CT abdomen and pelvis with contrast revealed perineal inflammation with possible 1.7 cm superficial abscess without internal air.  Met sepsis criteria on presentation with leukopenia < 4000, tachycardia > 90, and source of infection (abscess).  Surgery performed bedside incision and drainage.  Given doses of cefepime and vancomycin in the ED.  Given CT report, questionable cellulitis of the perineum.   Patient states he has had frequent abscesses involving the anterior and inner thighs. Denies a history of frequent infections and abscesses during childhood. Unsure of the likelihood of chronic granulomatous disease given the patient's age but could warrant outpatient work-up.      Plan:  Will start on IV Zosyn every 6 hours per surgery recommendation  Continue IV vancomycin  Obtain blood cultures and wound culture  Obtain MRSA culture  Monitor fever curve and daily CBC  General surgery consulted  Can consider infectious disease consultation depending on culture results  Geriatric pain regimen    Sepsis (HCC)  Assessment & Plan  Met sepsis criteria on presentation with leukopenia < 4000, tachycardia > 90, and source of infection (abscess).  Presumed source of infection is perineal abscess, however patient was persistently having a dry cough and spiked a fever of 100.5 in the ED.  Cough is attributed to throat irritation s/p EGD.  Lungs with diffuse wheezing but without crackles or diminished breath sounds.   Given 500 cc bolus of NS in the ED.    Plan:  Check CXR to rule out pneumonia  Follow-up procalcitonin and lactic acid  Check urinalysis with microscopy  Start IV NS @ 100  cc/hr x 10 hrs  Robitussin and tessalon perles PRN for cough  Monitor fever curve and daily CBC    DM2 (diabetes mellitus, type 2) (HCC)  Assessment & Plan  Lab Results   Component Value Date    HGBA1C 7.3 (H) 08/18/2023       Recent Labs     12/19/23  0841 12/20/23  0039   POCGLU 131 105         Blood Sugar Average: Last 72 hrs:  (P) 105  Plan:  Hold home regimen while inpatient and resume on discharge: Glimepiride  Diabetic diet  Insulin regimen  Lantus 40 units HS  Glucose checks and Insulin correction ACHS  Goal -180 while admitted, adjusting insulin regimen as appropriate  Monitor for hypoglycemia and treat per protocol      Urinary retention  Assessment & Plan  History of urinary retention  Continue Flomax daily   Place on urinary retention protocol    Pancytopenia (HCC)  Assessment & Plan  History of chronic pancytopenia of unknown etiology.  Suspect partial contribution from alcoholic cirrhosis in regard to thrombocytopenia.  He is seeing outpatient hematology oncology and has been scheduled for MRI abdomen.  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  Continue outpatient work-up    MEG (obstructive sleep apnea)  Assessment & Plan  Continue CPAP at bedtime    Alcoholic cirrhosis (HCC)  Assessment & Plan  History of chronic alcohol abuse. He has quit drinking and reports his last drink was August 2022.  Hepatic function with mildly elevated AST, alkaline phosphatase 164, total bilirubin 1.30, and albumin of 3.1, all appears chronic.  CT abdomen with evidence of portal hypertension and splenomegaly. There is no evidence of ascites.  There is additional finding of inflammation surrounding the inferior mesenteric artery that has increased since June 20, 2023, and is of uncertain etiology and significance.   Outpatient follow-up with GI    Essential hypertension  Assessment & Plan  History of hypertension however not on any antihypertensives be on Lasix 20 mg daily  Patient reports blood  pressures have been acceptable at home.  Continue Lasix 20 mg daily  Monitor blood pressures daily           VTE Pharmacologic Prophylaxis: VTE Score: 9 High Risk (Score >/= 5) - Pharmacological DVT Prophylaxis Ordered: enoxaparin (Lovenox). Sequential Compression Devices Ordered.  Code Status: Level 1 - Full Code   Discussion with family: Patient declined call to .     Anticipated Length of Stay: Patient will be admitted on an observation basis with an anticipated length of stay of less than 2 midnights secondary to perineal abscess.    Chief Complaint: Swelling of the perineum    History of Present Illness:  Isael Avendano is a 68 y.o. male with a PMH of alcoholic cirrhosis, recurrent abscesses, T2DM on insulin, stage 3a CKD, chronic pancytopenia, MEG, and HTN who presents with swelling of the perineum. He states he noticed increasing swelling involving his perineum five days ago with progressive tenderness. The pain is particularly worse with movement and with contact with clothing and improved when sitting down. He had an EGD and colonoscopy performed by his gastroenterologist Dr. De La Garza who recommended that the patient seek medical treatment for his perineal complaint following the procedures. The patient denies any fever or chills. He does endorse a history of frequent abscesses involving his anterior and inner thighs that required incision and drainage and antibiotics.  He states that the exact etiology of these abscesses is unknown and attributes the cause to he denies a history of frequent infections and abscesses during childhood. In the ED, the patient met sepsis criteria with tachycardia and leukopenia. CT abdomen pelvis with contrast revealed perineal inflammation with possible 1.7 cm superficial abscess without internal air. He was given a half liter bolus of normal saline.  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 General  surgery was consulted and performed bedside incision and drainage. He was given a dose of cefepime and vancomycin in the ED.  He will be admitted under [facility] service for further workup and management for perineal abscess.    Review of Systems:  Review of Systems   Constitutional:  Negative for chills and fever.   HENT:  Negative for ear pain and sore throat.    Eyes:  Negative for pain and visual disturbance.   Respiratory:  Positive for cough. Negative for shortness of breath and wheezing.    Cardiovascular:  Positive for leg swelling. Negative for chest pain and palpitations.   Gastrointestinal:  Negative for abdominal distention, abdominal pain, nausea and vomiting.   Genitourinary:  Negative for dysuria and hematuria.        Perineal pain and swelling   Musculoskeletal:  Negative for arthralgias and back pain.   Skin:  Negative for color change and rash.   Neurological:  Negative for weakness and headaches.   Psychiatric/Behavioral:  Negative for agitation and confusion.    All other systems reviewed and are negative.      Past Medical and Surgical History:   Past Medical History:   Diagnosis Date    Arrhythmia     Chronic kidney disease     COVID 12/2020    CPAP (continuous positive airway pressure) dependence     Diabetes mellitus (HCC)     History of echocardiogram 11/14/2017    showed EF of 50-55 percentWith moderate LVH and left ventricle diastolic dysfunction. Left atrium was moderately enlarged. Trace MR noted.     History of Holter monitoring 11/21/2017    showed baseline rhythm of sinus origin with an average heart it of 61 bpm. The lowest heart rate was 49 and the highest heart rate was 10 8 bpm. There were rare single VPCs, and frequent PACs representing 3.2% of total beats. There were several episodes of sinus arrhythmias with sinus bradycardia and heart rate ranging from 40-90 bpm. No sustained dysrhythmias, or pauses noted. The patient did not    History of transfusion 04/2023    platelets    Liver  disease     cirhosis    Obese     Sleep apnea        Past Surgical History:   Procedure Laterality Date    CATARACT EXTRACTION      EYE SURGERY Left 1997    FL GUIDED NEEDLE PLAC BX/ASP/INJ  8/28/2023    HERNIA REPAIR  [id number]    JOINT REPLACEMENT Right     TKR    KNEE ARTHROPLASTY Right 2008    SD ARTHROPLASTY GLENOHUMERAL JOINT TOTAL SHOULDER Left 04/04/2023    Procedure: ARTHROPLASTY SHOULDER REVERSE;  Surgeon: Marcelo Lester MD;  Location: [facility];  Service: Orthopedics    SD JARED SHOULDER ARTHRPLSTY HUMERAL&GLENOID COMPNT Left 9/8/2023    Procedure: removal of antibiotic spacer, incision and debridement,  with revision reverse shoulder arthroplasty;  Surgeon: Lita Aguilar;  Location: [facility];  Service: Orthopedics    SD JARED SHOULDER ARTHRPLSTY HUMERAL/GLENOID COMPNT Left 06/13/2023    Procedure: ARTHROPLASTY SHOULDER REVISION;  Surgeon: Lita Aguilar;  Location: [facility];  Service: Orthopedics    SHOULDER SURGERY Right 2002    WOUND DEBRIDEMENT Left 05/02/2023    Procedure: INCISION AND DRAINAGE (I&D) EXTREMITY, vac placement;  Surgeon: Marcelo Lester MD;  Location: [facility];  Service: Orthopedics       Meds/Allergies:  Prior to Admission medications    Medication Sig Start Date End Date Taking? Authorizing Provider   benzonatate (TESSALON) 200 MG capsule TAKE 1 CAPSULE BY MOUTH 3 TIMES DAILY AS NEEDED for cough 8/27/23   Wade Chiu PA-C   furosemide (LASIX) 20 mg tablet Take 1 tablet (20 mg total) by mouth every other day  Patient taking differently: Take 20 mg by mouth every morning 6/28/23 12/19/23  Navin Montelongo PA-C   glimepiride (AMARYL) 4 mg tablet Take 4 mg by mouth 2 (two) times a day 3/22/23   Historical Provider, MD   Insulin Pen Needle (BD Pen Needle Nayla 2nd Gen) 32G X 4 MM MISC For use with insulin pen. Pharmacy may dispense brand covered by insurance. 5/19/22   Ino Ya MD   Lantus SoloStar 100 units/mL SOPN Inject 40 Units under the skin  daily at bedtime 23   Historical Provider, MD   Multiple Vitamin (multivitamin) capsule Take 1 capsule by mouth daily 1/3/21   Lorenzo JERONIMO MD   polyethylene glycol (GOLYTELY) 4000 mL solution Take 4,000 mL by mouth once for 1 dose 23  Alysia Quintero PA-C   tamsulosin (FLOMAX) 0.4 mg Take 1 capsule (0.4 mg total) by mouth daily with dinner 23  Navin Montelongo PA-C   traZODone (DESYREL) 50 mg tablet Take 50 mg by mouth daily at bedtime bedtime 22   Historical Provider, MD   fluticasone-salmeterol (ADVAIR HFA) 115-21 MCG/ACT inhaler Inhale 2 puffs 2 (two) times a day Rinse mouth after use.  Patient not taking: Reported on 7/15/2021  5/17/22  Historical Provider, MD   Glyxambi 25-5 MG TABS  10/22/20 5/17/22  Historical Provider, MD     I have reviewed home medications with patient personally.    Allergies:   Allergies   Allergen Reactions    Sulfa Antibiotics Hives    Daptomycin Other (See Comments)     Possibly contributed to ABILIO on 2023 admission        Social History:  Marital Status: /Civil Union   Occupation: N/A  Patient Pre-hospital Living Situation: Home  Patient Pre-hospital Level of Mobility: walks  Patient Pre-hospital Diet Restrictions: N/A  Substance Use History:   Social History     Substance and Sexual Activity   Alcohol Use Not Currently    Comment: quit      Social History     Tobacco Use   Smoking Status Former    Current packs/day: 0.00    Average packs/day: 0.5 packs/day for 20.0 years (10.0 ttl pk-yrs)    Types: Cigarettes    Start date:     Quit date:     Years since quittin.9   Smokeless Tobacco Never     Social History     Substance and Sexual Activity   Drug Use Never       Family History:  Family History   Problem Relation Age of Onset    Diabetes Mother     Supraventricular tachycardia Mother     COPD Father     Heart disease Father     Other Father         Sepsis; related to UTI with complicating cardiac problems     Heart disease Paternal Grandmother     Prostate cancer Paternal Grandfather 82       Physical Exam:     Vitals:   Blood Pressure: 135/60 (230)  Pulse: (!) 124 (23)  Temperature: 100.5 °F (38.1 °C) (23)  Temp Source: Oral (23)  Respirations: 18 (23)  SpO2: 97 % (23)    Physical Exam  Vitals and nursing note reviewed.   Constitutional:       General: He is not in acute distress.     Appearance: He is well-developed.   HENT:      Head: Normocephalic and atraumatic.      Right Ear: External ear normal.      Left Ear: External ear normal.   Eyes:      Conjunctiva/sclera: Conjunctivae normal.   Cardiovascular:      Rate and Rhythm: Normal rate and regular rhythm.      Heart sounds: Normal heart sounds, S1 normal and S2 normal. No murmur heard.     Comments: Bilateral lower extremities with venous stasis changes  Pulmonary:      Effort: Pulmonary effort is normal. No respiratory distress.      Breath sounds: Wheezing present. No decreased breath sounds, rhonchi or rales.   Abdominal:      Palpations: Abdomen is soft.      Tenderness: There is no abdominal tenderness.      Comments: Abdomen is tense   Musculoskeletal:         General: No swelling.      Cervical back: Neck supple.      Right lower le+ Pitting Edema present.      Left lower le+ Pitting Edema present.   Skin:     General: Skin is warm and dry.      Capillary Refill: Capillary refill takes less than 2 seconds.      Comments: Perineal abscess as pictured below   Neurological:      Mental Status: He is alert and oriented to person, place, and time.   Psychiatric:         Mood and Affect: Mood normal.         Speech: Speech normal.         Behavior: Behavior is cooperative.            Additional Data:     Lab Results:  Results from last 7 days   Lab Units 23  1702   WBC Thousand/uL 3.13*   HEMOGLOBIN g/dL 10.3*   HEMATOCRIT % 32.8*   PLATELETS Thousands/uL 88*   NEUTROS PCT % 72    LYMPHS PCT % 12*   MONOS PCT % 8   EOS PCT % 7*     Results from last 7 days   Lab Units 12/19/23  1550   SODIUM mmol/L 135   POTASSIUM mmol/L 4.1   CHLORIDE mmol/L 100   CO2 mmol/L 30   BUN mg/dL 22   CREATININE mg/dL 1.02   ANION GAP mmol/L 5   CALCIUM mg/dL 9.2   ALBUMIN g/dL 3.1*   TOTAL BILIRUBIN mg/dL 1.30*   ALK PHOS U/L 164*   ALT U/L 39   AST U/L 57*   GLUCOSE RANDOM mg/dL 332*         Results from last 7 days   Lab Units 12/20/23  0039 12/19/23  0841   POC GLUCOSE mg/dl 105 131         Results from last 7 days   Lab Units 12/19/23  1550   PROCALCITONIN ng/ml 0.31*       Lines/Drains:  Invasive Devices       Peripheral Intravenous Line  Duration             Peripheral IV 12/19/23 Right Antecubital <1 day                        Imaging: Reviewed radiology reports from this admission including: abdominal/pelvic CT  CT abdomen pelvis with contrast   Final Result by Navin Gamble MD (12/19 1841)      1.  Perineal inflammation with possible 1.7 cm superficial abscess without internal air.   2.  Inflammation surrounding the inferior mesenteric artery has increased since June 20, 2023, and is of uncertain etiology and significance.   3.  Hepatic cirrhosis with findings of portal hypertension. No ascites.      The study was marked in EPIC for immediate notification.      Workstation performed: [id number]         XR chest portable    (Results Pending)       EKG and Other Studies Reviewed on Admission:   EKG: No EKG obtained.    ** Please Note: This note has been constructed using a voice recognition system. **

## 2023-12-20 NOTE — ASSESSMENT & PLAN NOTE
Lab Results   Component Value Date    HGBA1C 7.3 (H) 08/18/2023       Recent Labs     12/19/23  0841   POCGLU 131       Blood Sugar Average: Last 72 hrs:    Plan:  Hold home regimen while inpatient and resume on discharge: Glimepiride  Diabetic diet  Insulin regimen  Lantus 40 units HS  Glucose checks and Insulin correction ACHS  Goal -180 while admitted, adjusting insulin regimen as appropriate  Monitor for hypoglycemia and treat per protocol

## 2023-12-20 NOTE — ASSESSMENT & PLAN NOTE
Patient presenting with perineal swelling and tenderness that started five days ago.   CT abdomen and pelvis with contrast revealed perineal inflammation with possible 1.7 cm superficial abscess without internal air.  Met sepsis criteria on presentation with leukopenia < 4000, tachycardia > 90, and source of infection (abscess).  Surgery performed bedside incision and drainage.  Given doses of cefepime and vancomycin in the ED.  Given CT report, questionable cellulitis of the perineum.   Patient states he has had frequent abscesses involving the anterior and inner thighs. Denies a history of frequent infections and abscesses during childhood. Unsure of the likelihood of chronic granulomatous disease given the patient's age but could warrant outpatient work-up.    12/21 patient felt significantly better after surgical I&D.  Remained afebrile with no erythema.  Continue wound care upon for discharge.

## 2023-12-20 NOTE — ASSESSMENT & PLAN NOTE
Met sepsis criteria on presentation with leukopenia < 4000, tachycardia > 90, and source of infection (abscess).  Presumed source of infection is perineal abscess, however patient was persistently having a dry cough and spiked a fever of 100.5 in the ED.  Cough is attributed to throat irritation s/p EGD.  Lungs with diffuse wheezing but without crackles or diminished breath sounds.   Given 500 cc bolus of NS in the ED.    Plan:  Check CXR to rule out pneumonia  Follow-up procalcitonin and lactic acid  Check urinalysis with microscopy  Start IV NS @ 100 cc/hr x 10 hrs  Robitussin and tessalon perles PRN for cough  Monitor fever curve and daily CBC

## 2023-12-20 NOTE — ASSESSMENT & PLAN NOTE
Met sepsis criteria on presentation with leukopenia < 4000, tachycardia > 90, and source of infection (abscess).  Presumed source of infection is perineal abscess, however patient was persistently having a dry cough and spiked a fever of 100.5 in the ED.  Cough is attributed to throat irritation s/p EGD.  Lungs with diffuse wheezing but without crackles or diminished breath sounds.   Given 500 cc bolus of NS in the ED.      Resolved

## 2023-12-20 NOTE — CONSULTS
Consultation - General Surgery  : General Surgery Resident Role on [employer]  Isael Avendano 68 y.o. male MRN: [id number]  Unit/Bed#: -01 Encounter: [id number]    Assessment:  68 y.o. male with perineal abscess; history of recurrent abscesses, alcoholic cirrhosis, DM, CKD, MEG.    Plan:  - Diet as tolerated  - Bedside I&D of perineal abscess  - Initiate zosyn  - Appreciate medicine management of multiple comorbidities    HPI:  Isael Avendano is a 68 y.o. male with a history of multiple abscesses, alcoholic cirrhosis, diabetes mellitus, CKD, MEG.  Patient reports abscesses that have been occurring every 6 to 8 months for 5 to 6 years.  Such lesions have been present in thigh, beltline, groin.  He reports these usually come and go, but have required incision and drainage in the past.  Additionally reports abscess of shoulder replacement.    He presents to the emergency department after 5 to 6 days of swelling in the perineal area.  Pain has been worsening and is exacerbated by sitting/moving certain ways.  Nothing has since drained from this abscess.    Patient denies fever, chills, nausea, vomiting, shortness of breath.  Endorses having normal bowel movements.  Does not take antiplatelets or antithrombotic agents.    Physical Exam  Vitals reviewed.   Constitutional:       General: He is not in acute distress.     Appearance: He is not ill-appearing.   HENT:      Head: Normocephalic and atraumatic.      Mouth/Throat:      Mouth: Mucous membranes are dry.   Eyes:      Extraocular Movements: Extraocular movements intact.      Conjunctiva/sclera: Conjunctivae normal.   Cardiovascular:      Rate and Rhythm: Normal rate.   Pulmonary:      Effort: Pulmonary effort is normal. No respiratory distress.   Abdominal:      General: There is no distension.      Palpations: Abdomen is soft.      Tenderness: There is no abdominal tenderness.   Genitourinary:     Comments: Perineal swelling, erythema, fluctuance,  induration. Pustule over firm area of tissue. No active drainage at time of inspection  Musculoskeletal:      Right lower leg: No edema.      Left lower leg: No edema.      Comments: Appearance of chronic venous changes   Skin:     General: Skin is warm and dry.   Neurological:      Mental Status: He is alert.        Review of Systems   Constitutional:  Negative for activity change, appetite change, chills and fever.   Respiratory:  Negative for shortness of breath.    Cardiovascular:  Negative for chest pain and leg swelling.   Gastrointestinal:  Negative for abdominal pain, constipation, diarrhea, nausea and vomiting.   Genitourinary:  Negative for difficulty urinating.   Skin:  Positive for wound.        Reports abscess on perineum   Neurological:  Negative for dizziness and headaches.        Objective         Intake/Output Summary (Last 24 hours) at 12/20/2023 0233  Last data filed at 12/19/2023 2212  Gross per 24 hour   Intake 1000 ml   Output --   Net 1000 ml       First Vitals:   Blood Pressure: 133/64 (12/19/23 1346)  Pulse: 92 (12/19/23 1346)  Temperature: 98.8 °F (37.1 °C) (12/19/23 1346)  Temp Source: Oral (12/19/23 2300)  Respirations: 16 (12/19/23 1346)  SpO2: 100 % (12/19/23 1346)    Current Vitals:   Blood Pressure: 135/60 (12/19/23 2300)  Pulse: (!) 124 (12/19/23 2300)  Temperature: 100.5 °F (38.1 °C) (12/19/23 2300)  Temp Source: Oral (12/19/23 2300)  Respirations: 18 (12/19/23 2300)  SpO2: 97 % (12/19/23 2200)    Invasive Devices       Peripheral Intravenous Line  Duration             Peripheral IV 12/19/23 Right Antecubital <1 day                    Imaging: I have personally reviewed pertinent reports.      CT abdomen pelvis with contrast    Result Date: 12/19/2023  Impression: 1.  Perineal inflammation with possible 1.7 cm superficial abscess without internal air. 2.  Inflammation surrounding the inferior mesenteric artery has increased since June 20, 2023, and is of uncertain etiology and  significance. 3.  Hepatic cirrhosis with findings of portal hypertension. No ascites. The study was marked in EPIC for immediate notification. Workstation performed: [id number]     Colonoscopy    Result Date: 12/19/2023  Impression: One 15 mm Latonia Ip polyp in the proximal sigmoid colon; performed cold snare with piecemeal removal Small (grade 1) hemorrhoids RECOMMENDATION:  Await pathology results  Will repeat colonoscopy in 3 years, pending his overall health.    No Staff Documented     EGD    Result Date: 12/19/2023  Impression: Irregular Z-line Multiple medium and moderate grade II varices in the lower third of the esophagus The duodenal bulb, 1st part of the duodenum and 2nd part of the duodenum appeared normal. Cobblestone, erythematous and scalloped mucosa in the stomach; performed cold forceps biopsy RECOMMENDATION:  Patient has grade 2 esophageal varices and also portal hypertensive gastropathy.  May benefit from nonselective beta-blocker like carvedilol as a primary prevention of GI bleeding. He is seeing a hepatologist soon, will defer to them.    No Staff Documented       EKG, Pathology, and Other Studies: I have personally reviewed pertinent reports.    VTE Pharmacologic Prophylaxis: Enoxaparin (Lovenox)  VTE Mechanical Prophylaxis: sequential compression device    Historical Information   Past Medical History:   Diagnosis Date    Arrhythmia     Chronic kidney disease     COVID 12/2020    CPAP (continuous positive airway pressure) dependence     Diabetes mellitus (HCC)     History of echocardiogram 11/14/2017    showed EF of 50-55 percentWith moderate LVH and left ventricle diastolic dysfunction. Left atrium was moderately enlarged. Trace MR noted.     History of Holter monitoring 11/21/2017    showed baseline rhythm of sinus origin with an average heart it of 61 bpm. The lowest heart rate was 49 and the highest heart rate was 10 8 bpm. There were rare single VPCs, and frequent PACs representing 3.2% of  total beats. There were several episodes of sinus arrhythmias with sinus bradycardia and heart rate ranging from 40-90 bpm. No sustained dysrhythmias, or pauses noted. The patient did not    History of transfusion 2023    platelets    Liver disease     cirhosis    Obese     Sleep apnea      Past Surgical History:   Procedure Laterality Date    CATARACT EXTRACTION      EYE SURGERY Left     FL GUIDED NEEDLE PLAC BX/ASP/INJ  2023    HERNIA REPAIR  [id number]    JOINT REPLACEMENT Right     TKR    KNEE ARTHROPLASTY Right 2008    NY ARTHROPLASTY GLENOHUMERAL JOINT TOTAL SHOULDER Left 2023    Procedure: ARTHROPLASTY SHOULDER REVERSE;  Surgeon: Marcelo Lester MD;  Location: [facility];  Service: Orthopedics    NY JARED SHOULDER ARTHRPLSTY HUMERAL&GLENOID COMPNT Left 2023    Procedure: removal of antibiotic spacer, incision and debridement,  with revision reverse shoulder arthroplasty;  Surgeon: Lita Aguilar;  Location: [facility];  Service: Orthopedics    NY JARED SHOULDER ARTHRPLSTY HUMERAL/GLENOID COMPNT Left 2023    Procedure: ARTHROPLASTY SHOULDER REVISION;  Surgeon: Lita Aguilar;  Location: [facility];  Service: Orthopedics    SHOULDER SURGERY Right 2002    WOUND DEBRIDEMENT Left 2023    Procedure: INCISION AND DRAINAGE (I&D) EXTREMITY, vac placement;  Surgeon: Marcelo Lester MD;  Location: [facility];  Service: Orthopedics     Social History   Social History     Substance and Sexual Activity   Alcohol Use Not Currently    Comment: quit      Social History     Substance and Sexual Activity   Drug Use Never     Social History     Tobacco Use   Smoking Status Former    Current packs/day: 0.00    Average packs/day: 0.5 packs/day for 20.0 years (10.0 ttl pk-yrs)    Types: Cigarettes    Start date:     Quit date:     Years since quittin.9   Smokeless Tobacco Never     Family History   Problem Relation Age of Onset    Diabetes Mother     Supraventricular  tachycardia Mother     COPD Father     Heart disease Father     Other Father         Sepsis; related to UTI with complicating cardiac problems    Heart disease Paternal Grandmother     Prostate cancer Paternal Grandfather 82       Meds/Allergies   all current active meds have been reviewed, current meds:   Current Facility-Administered Medications   Medication Dose Route Frequency    acetaminophen (TYLENOL) tablet 650 mg  650 mg Oral Q6H PRN    acetaminophen (TYLENOL) tablet 650 mg  650 mg Oral Q4H PRN    benzonatate (TESSALON PERLES) capsule 200 mg  200 mg Oral TID PRN    dextromethorphan-guaiFENesin (ROBITUSSIN DM) oral syrup 10 mL  10 mL Oral Q4H PRN    enoxaparin (LOVENOX) subcutaneous injection 40 mg  40 mg Subcutaneous Daily    furosemide (LASIX) tablet 20 mg  20 mg Oral QAM    HYDROmorphone HCl (DILAUDID) injection 0.2 mg  0.2 mg Intravenous Q4H PRN    insulin glargine (LANTUS) subcutaneous injection 40 Units 0.4 mL  40 Units Subcutaneous HS    insulin lispro (HumaLOG) 100 units/mL subcutaneous injection 1-6 Units  1-6 Units Subcutaneous TID AC    multivitamin stress formula tablet 1 tablet  1 tablet Oral Daily    ondansetron (ZOFRAN) injection 4 mg  4 mg Intravenous Q6H PRN    oxyCODONE (ROXICODONE) split tablet 2.5 mg  2.5 mg Oral Q4H PRN    Or    oxyCODONE (ROXICODONE) IR tablet 5 mg  5 mg Oral Q4H PRN    piperacillin-tazobactam (ZOSYN) 3.375 g in sodium chloride 0.9 % 100 mL IVPB  3.375 g Intravenous Q6H    sodium chloride 0.9 % infusion  100 mL/hr Intravenous Continuous    tamsulosin (FLOMAX) capsule 0.4 mg  0.4 mg Oral Daily With Dinner    traZODone (DESYREL) tablet 50 mg  50 mg Oral HS    vancomycin (VANCOCIN) IVPB (premix in dextrose) 1,000 mg 200 mL  1,000 mg Intravenous Q12H    and PTA meds:   Prior to Admission Medications   Prescriptions Last Dose Informant Patient Reported? Taking?   Insulin Pen Needle (BD Pen Needle Nayla 2nd Gen) 32G X 4 MM MISC  Self No No   Sig: For use with insulin pen.  Pharmacy may dispense brand covered by insurance.   Lantus SoloStar 100 units/mL SOPN  Self Yes No   Sig: Inject 40 Units under the skin daily at bedtime   Multiple Vitamin (multivitamin) capsule  Self No No   Sig: Take 1 capsule by mouth daily   benzonatate (TESSALON) 200 MG capsule  Self No No   Sig: TAKE 1 CAPSULE BY MOUTH 3 TIMES DAILY AS NEEDED for cough   furosemide (LASIX) 20 mg tablet  Self No No   Sig: Take 1 tablet (20 mg total) by mouth every other day   Patient taking differently: Take 20 mg by mouth every morning   glimepiride (AMARYL) 4 mg tablet  Self Yes No   Sig: Take 4 mg by mouth 2 (two) times a day   polyethylene glycol (GOLYTELY) 4000 mL solution   No No   Sig: Take 4,000 mL by mouth once for 1 dose   tamsulosin (FLOMAX) 0.4 mg  Self No No   Sig: Take 1 capsule (0.4 mg total) by mouth daily with dinner   traZODone (DESYREL) 50 mg tablet  Self Yes No   Sig: Take 50 mg by mouth daily at bedtime bedtime      Facility-Administered Medications: None     Allergies   Allergen Reactions    Sulfa Antibiotics Hives    Daptomycin Other (See Comments)     Possibly contributed to ABILIO on 6/2023 admission        Lab Results: I have personally reviewed pertinent lab results.  , CBC:   Lab Results   Component Value Date    WBC 3.13 (L) 12/19/2023    HGB 10.3 (L) 12/19/2023    HCT 32.8 (L) 12/19/2023    MCV 86 12/19/2023    PLT 88 (L) 12/19/2023    RBC 3.81 (L) 12/19/2023    MCH 27.0 12/19/2023    MCHC 31.4 12/19/2023    RDW 15.4 (H) 12/19/2023    MPV 11.6 12/19/2023    NRBC 0 12/19/2023   , CMP:   Lab Results   Component Value Date    SODIUM 135 12/19/2023    K 4.1 12/19/2023     12/19/2023    CO2 30 12/19/2023    BUN 22 12/19/2023    CREATININE 1.02 12/19/2023    CALCIUM 9.2 12/19/2023    AST 57 (H) 12/19/2023    ALT 39 12/19/2023    ALKPHOS 164 (H) 12/19/2023    EGFR 74 12/19/2023       Counseling / Coordination of Care  Total floor / unit time spent today 25 minutes.  Greater than 50% of total time was  spent with the patient and / or family counseling and / or coordination of care.    Consult to surgery general  Consult performed by: Caitlin Murcia MD  Consult ordered by: Mike Christian DO      ---  Caitlin Murcia MD  General Surgery PGY-I

## 2023-12-20 NOTE — QUICK NOTE
Bedside Incision and Drainage Procedure    Date: 12/20/2023  Time: 12:00 am    Location of abscess: Perineum    Dimensions of collection: 2 cm x 1.5 cm x 1 cm    Procedure:  After obtaining informed consent and confirming procedure and patient identity with patient's nurse, the area was prepped with betadine and draped with sterile draping. Local anaesthetic was administered (lidocaine/epi infiltrated.) Using a #11 blade, an incision was made approximately 2 cm long. Purulent fluid drained via the pustule on the superficial skin. The cavity was then drained, and an instrument was used to probe the wound and break-up any loculations. Overlying skin noted to be thickened. The wound was then irrigated with betadine and sterile normal saline. The wound was packed with iodoform packing and covered with a clean, dry dressing. The patient tolerated the procedure well.    Wound Images:      Additional Notes:  The dressing will be changed daily with iodoform packing, gauze/ABD.    ---  Caitlin Murcia MD  General Surgery PGY-I

## 2023-12-20 NOTE — ASSESSMENT & PLAN NOTE
History of chronic alcohol abuse. He has quit drinking and reports his last drink was August 2022.  Hepatic function with mildly elevated AST, alkaline phosphatase 164, total bilirubin 1.30, and albumin of 3.1, all appears chronic.  CT abdomen with evidence of portal hypertension and splenomegaly. There is no evidence of ascites.  There is additional finding of inflammation surrounding the inferior mesenteric artery that has increased since June 20, 2023, and is of uncertain etiology and significance.   Outpatient follow-up with GI

## 2023-12-20 NOTE — ASSESSMENT & PLAN NOTE
Patient presenting with perineal swelling and tenderness that started five days ago.   CT abdomen and pelvis with contrast revealed perineal inflammation with possible 1.7 cm superficial abscess without internal air.  Met sepsis criteria on presentation with leukopenia < 4000, tachycardia > 90, and source of infection (abscess).  Surgery performed bedside incision and drainage.  Given doses of cefepime and vancomycin in the ED.  Given CT report, questionable cellulitis of the perineum.   Patient states he has had frequent abscesses involving the anterior and inner thighs. Denies a history of frequent infections and abscesses during childhood. Unsure of the likelihood of chronic granulomatous disease given the patient's age but could warrant outpatient work-up.      Plan:  Will start on IV Zosyn every 6 hours per surgery recommendation  Continue IV vancomycin  Obtain blood cultures and wound culture  Obtain MRSA culture  Monitor fever curve and daily CBC  General surgery consulted  Can consider infectious disease consultation depending on culture results

## 2023-12-20 NOTE — QUICK NOTE
Wound Check Perineum    Dressing removed, packing removed.  Wound open and without drainage.  No erythema noted.  Jenn-incisional tenderness.    Wound flushed and repacked with 1/4 inch plain packing.    Continue daily dressing change.  May remove packing tomorrow and leave packing out.  Cover with dry dressing.    Follow-up wound care outpatient.

## 2023-12-20 NOTE — DISCHARGE INSTR - AVS FIRST PAGE
Dear Isael Avendano,     It was our pleasure to care for you here at [facility].  It is our hope that we were always able to exceed the expected standards for your care during your stay.  You were hospitalized due to perineal abscess.  You were cared for on the first floor by Charo Hardy MD under the service of Raya Arevalo MD with the [facility] who covers for your primary care physician (PCP), Collin Marcos MD, while you were hospitalized.  If you have any questions or concerns related to this hospitalization, you may contact us at .  For follow up as well as any medication refills, we recommend that you follow up with your primary care physician.  A registered nurse will reach out to you by phone within a few days after your discharge to answer any additional questions that you may have after going home.  However, at this time we provide for you here, the most important instructions / recommendations at discharge:     Notable Medication Adjustments -   Please finish 2-day course of levofloxacin 500 mg  Please resume all your other home medications  Testing Required after Discharge -   Please follow-up with your primary care doctor    Important follow up information -     Other Instructions -   Please follow wound care instructions listed below.  If you have new onset fevers, chills, pain or redness in the area of the incision please report back to the ED.   Please review this entire after visit summary as additional general instructions including medication list, appointments, activity, diet, any pertinent wound care, and other additional recommendations from your care team that may be provided for you.      Sincerely,     Charo Hardy MD     Wound care instructions:  Remove packing 12/21. Do not need to replace. Wash wound daily. Pat dry. Cover wound with dry gauze dressing. Change daily or more frequently as needed for soilage. Keep area  clean and dry.

## 2023-12-21 ENCOUNTER — APPOINTMENT (OUTPATIENT)
Dept: PHYSICAL THERAPY | Facility: CLINIC | Age: 68
End: 2023-12-21
Payer: MEDICARE

## 2023-12-21 VITALS
HEART RATE: 89 BPM | RESPIRATION RATE: 18 BRPM | OXYGEN SATURATION: 94 % | TEMPERATURE: 98.5 F | DIASTOLIC BLOOD PRESSURE: 58 MMHG | SYSTOLIC BLOOD PRESSURE: 119 MMHG

## 2023-12-21 LAB
ANION GAP SERPL CALCULATED.3IONS-SCNC: 6 MMOL/L
ANION GAP SERPL CALCULATED.3IONS-SCNC: 9 MMOL/L
ANISOCYTOSIS BLD QL SMEAR: PRESENT
BASOPHILS # BLD MANUAL: 0 THOUSAND/UL (ref 0–0.1)
BASOPHILS NFR MAR MANUAL: 0 % (ref 0–1)
BUN SERPL-MCNC: 25 MG/DL (ref 5–25)
BUN SERPL-MCNC: 26 MG/DL (ref 5–25)
CALCIUM SERPL-MCNC: 8.5 MG/DL (ref 8.4–10.2)
CALCIUM SERPL-MCNC: 8.8 MG/DL (ref 8.4–10.2)
CHLORIDE SERPL-SCNC: 104 MMOL/L (ref 96–108)
CHLORIDE SERPL-SCNC: 106 MMOL/L (ref 96–108)
CO2 SERPL-SCNC: 21 MMOL/L (ref 21–32)
CO2 SERPL-SCNC: 25 MMOL/L (ref 21–32)
CREAT SERPL-MCNC: 1.1 MG/DL (ref 0.6–1.3)
CREAT SERPL-MCNC: 1.11 MG/DL (ref 0.6–1.3)
EOSINOPHIL # BLD MANUAL: 0.26 THOUSAND/UL (ref 0–0.4)
EOSINOPHIL NFR BLD MANUAL: 10 % (ref 0–6)
ERYTHROCYTE [DISTWIDTH] IN BLOOD BY AUTOMATED COUNT: 15.4 % (ref 11.6–15.1)
ERYTHROCYTE [DISTWIDTH] IN BLOOD BY AUTOMATED COUNT: 15.5 % (ref 11.6–15.1)
GFR SERPL CREATININE-BSD FRML MDRD: 66 ML/MIN/1.73SQ M
GFR SERPL CREATININE-BSD FRML MDRD: 67 ML/MIN/1.73SQ M
GLUCOSE SERPL-MCNC: 202 MG/DL (ref 65–140)
GLUCOSE SERPL-MCNC: 265 MG/DL (ref 65–140)
GLUCOSE SERPL-MCNC: 57 MG/DL (ref 65–140)
GLUCOSE SERPL-MCNC: 67 MG/DL (ref 65–140)
HCT VFR BLD AUTO: 28.5 % (ref 36.5–49.3)
HCT VFR BLD AUTO: 30.2 % (ref 36.5–49.3)
HGB BLD-MCNC: 8.7 G/DL (ref 12–17)
HGB BLD-MCNC: 9.2 G/DL (ref 12–17)
LG PLATELETS BLD QL SMEAR: PRESENT
LYMPHOCYTES # BLD AUTO: 0.67 THOUSAND/UL (ref 0.6–4.47)
LYMPHOCYTES # BLD AUTO: 26 % (ref 14–44)
MCH RBC QN AUTO: 26.3 PG (ref 26.8–34.3)
MCH RBC QN AUTO: 26.6 PG (ref 26.8–34.3)
MCHC RBC AUTO-ENTMCNC: 30.5 G/DL (ref 31.4–37.4)
MCHC RBC AUTO-ENTMCNC: 30.5 G/DL (ref 31.4–37.4)
MCV RBC AUTO: 86 FL (ref 82–98)
MCV RBC AUTO: 87 FL (ref 82–98)
MONOCYTES # BLD AUTO: 0.13 THOUSAND/UL (ref 0–1.22)
MONOCYTES NFR BLD: 5 % (ref 4–12)
MRSA NOSE QL CULT: ABNORMAL
MRSA NOSE QL CULT: ABNORMAL
NEUTROPHILS # BLD MANUAL: 1.51 THOUSAND/UL (ref 1.85–7.62)
NEUTS BAND NFR BLD MANUAL: 1 % (ref 0–8)
NEUTS SEG NFR BLD AUTO: 58 % (ref 43–75)
PLATELET # BLD AUTO: 65 THOUSANDS/UL (ref 149–390)
PLATELET # BLD AUTO: 70 THOUSANDS/UL (ref 149–390)
PLATELET BLD QL SMEAR: ABNORMAL
PMV BLD AUTO: 12.2 FL (ref 8.9–12.7)
PMV BLD AUTO: 13.3 FL (ref 8.9–12.7)
POIKILOCYTOSIS BLD QL SMEAR: PRESENT
POLYCHROMASIA BLD QL SMEAR: PRESENT
POTASSIUM SERPL-SCNC: 3.3 MMOL/L (ref 3.5–5.3)
POTASSIUM SERPL-SCNC: 4.1 MMOL/L (ref 3.5–5.3)
PROCALCITONIN SERPL-MCNC: 2.82 NG/ML
RBC # BLD AUTO: 3.31 MILLION/UL (ref 3.88–5.62)
RBC # BLD AUTO: 3.46 MILLION/UL (ref 3.88–5.62)
RBC MORPH BLD: PRESENT
SODIUM SERPL-SCNC: 134 MMOL/L (ref 135–147)
SODIUM SERPL-SCNC: 137 MMOL/L (ref 135–147)
VANCOMYCIN SERPL-MCNC: 34.9 UG/ML (ref 10–20)
VANCOMYCIN TROUGH SERPL-MCNC: 30.6 UG/ML (ref 10–20)
WBC # BLD AUTO: 2.15 THOUSAND/UL (ref 4.31–10.16)
WBC # BLD AUTO: 2.56 THOUSAND/UL (ref 4.31–10.16)

## 2023-12-21 PROCEDURE — 84145 PROCALCITONIN (PCT): CPT

## 2023-12-21 PROCEDURE — 80202 ASSAY OF VANCOMYCIN: CPT

## 2023-12-21 PROCEDURE — 85027 COMPLETE CBC AUTOMATED: CPT | Performed by: INTERNAL MEDICINE

## 2023-12-21 PROCEDURE — 80048 BASIC METABOLIC PNL TOTAL CA: CPT | Performed by: INTERNAL MEDICINE

## 2023-12-21 PROCEDURE — 85027 COMPLETE CBC AUTOMATED: CPT

## 2023-12-21 PROCEDURE — 80048 BASIC METABOLIC PNL TOTAL CA: CPT

## 2023-12-21 PROCEDURE — 80202 ASSAY OF VANCOMYCIN: CPT | Performed by: INTERNAL MEDICINE

## 2023-12-21 PROCEDURE — 82948 REAGENT STRIP/BLOOD GLUCOSE: CPT

## 2023-12-21 PROCEDURE — 99239 HOSP IP/OBS DSCHRG MGMT >30: CPT | Performed by: INTERNAL MEDICINE

## 2023-12-21 PROCEDURE — 85007 BL SMEAR W/DIFF WBC COUNT: CPT

## 2023-12-21 RX ORDER — POTASSIUM CHLORIDE 20 MEQ/1
40 TABLET, EXTENDED RELEASE ORAL ONCE
Status: COMPLETED | OUTPATIENT
Start: 2023-12-21 | End: 2023-12-21

## 2023-12-21 RX ORDER — GUAIFENESIN/DEXTROMETHORPHAN 100-10MG/5
10 SYRUP ORAL EVERY 4 HOURS PRN
Qty: 354 ML | Refills: 0 | Status: SHIPPED | OUTPATIENT
Start: 2023-12-21 | End: 2023-12-31

## 2023-12-21 RX ORDER — VANCOMYCIN HYDROCHLORIDE 1 G/200ML
1000 INJECTION, SOLUTION INTRAVENOUS EVERY 24 HOURS
Status: DISCONTINUED | OUTPATIENT
Start: 2023-12-23 | End: 2023-12-21 | Stop reason: HOSPADM

## 2023-12-21 RX ORDER — POTASSIUM CHLORIDE 14.9 MG/ML
20 INJECTION INTRAVENOUS ONCE
Status: COMPLETED | OUTPATIENT
Start: 2023-12-21 | End: 2023-12-21

## 2023-12-21 RX ORDER — LEVOFLOXACIN 500 MG/1
500 TABLET, FILM COATED ORAL EVERY 24 HOURS
Qty: 2 TABLET | Refills: 0 | Status: SHIPPED | OUTPATIENT
Start: 2023-12-21 | End: 2023-12-23

## 2023-12-21 RX ADMIN — FUROSEMIDE 20 MG: 40 TABLET ORAL at 08:30

## 2023-12-21 RX ADMIN — INSULIN LISPRO 2 UNITS: 100 INJECTION, SOLUTION INTRAVENOUS; SUBCUTANEOUS at 12:40

## 2023-12-21 RX ADMIN — ENOXAPARIN SODIUM 40 MG: 40 INJECTION SUBCUTANEOUS at 08:30

## 2023-12-21 RX ADMIN — POTASSIUM CHLORIDE 40 MEQ: 1500 TABLET, EXTENDED RELEASE ORAL at 08:30

## 2023-12-21 RX ADMIN — PIPERACILLIN AND TAZOBACTAM 3.38 G: 36; 4.5 INJECTION, POWDER, FOR SOLUTION INTRAVENOUS at 12:31

## 2023-12-21 RX ADMIN — PIPERACILLIN AND TAZOBACTAM 3.38 G: 36; 4.5 INJECTION, POWDER, FOR SOLUTION INTRAVENOUS at 00:44

## 2023-12-21 RX ADMIN — B-COMPLEX W/ C & FOLIC ACID TAB 1 TABLET: TAB at 08:30

## 2023-12-21 RX ADMIN — POTASSIUM CHLORIDE 20 MEQ: 14.9 INJECTION, SOLUTION INTRAVENOUS at 08:30

## 2023-12-21 RX ADMIN — PIPERACILLIN AND TAZOBACTAM 3.38 G: 36; 4.5 INJECTION, POWDER, FOR SOLUTION INTRAVENOUS at 07:05

## 2023-12-21 RX ADMIN — VANCOMYCIN HYDROCHLORIDE 1750 MG: 10 INJECTION, POWDER, LYOPHILIZED, FOR SOLUTION INTRAVENOUS at 07:45

## 2023-12-21 NOTE — PLAN OF CARE
Problem: INFECTION - ADULT  Goal: Absence or prevention of progression during hospitalization  Description: INTERVENTIONS:  - Assess and monitor for signs and symptoms of infection  - Monitor lab/diagnostic results  - Monitor all insertion sites, i.e. indwelling lines, tubes, and drains  - Monitor endotracheal if appropriate and nasal secretions for changes in amount and color  - [place] appropriate cooling/warming therapies per order  - Administer medications as ordered  - Instruct and encourage patient and family to use good hand hygiene technique  - Identify and instruct in appropriate isolation precautions for identified infection/condition  Outcome: Progressing

## 2023-12-21 NOTE — DISCHARGE SUMMARY
[facility]  Discharge- Isael Avendano 1955, 68 y.o. male MRN: [id number]  Unit/Bed#: -Mary Encounter: [id number]  Primary Care Provider: Collin Marcos MD   Date and time admitted to hospital: 12/19/2023  2:58 PM    * Perineal abscess  Assessment & Plan  Patient presenting with perineal swelling and tenderness that started five days ago.   CT abdomen and pelvis with contrast revealed perineal inflammation with possible 1.7 cm superficial abscess without internal air.  Met sepsis criteria on presentation with leukopenia < 4000, tachycardia > 90, and source of infection (abscess).  Surgery performed bedside incision and drainage.  Given doses of cefepime and vancomycin in the ED.  Given CT report, questionable cellulitis of the perineum.   Patient states he has had frequent abscesses involving the anterior and inner thighs. Denies a history of frequent infections and abscesses during childhood. Unsure of the likelihood of chronic granulomatous disease given the patient's age but could warrant outpatient work-up.    12/21 patient felt significantly better after surgical I&D.  Remained afebrile with no erythema.  Continue wound care upon for discharge.     Sepsis (HCC)  Assessment & Plan  Met sepsis criteria on presentation with leukopenia < 4000, tachycardia > 90, and source of infection (abscess).  Presumed source of infection is perineal abscess, however patient was persistently having a dry cough and spiked a fever of 100.5 in the ED.  Cough is attributed to throat irritation s/p EGD.  Lungs with diffuse wheezing but without crackles or diminished breath sounds.   Given 500 cc bolus of NS in the ED.      Resolved    Urinary retention  Assessment & Plan  History of urinary retention  Continue Flomax daily   Place on urinary retention protocol    Pancytopenia (HCC)  Assessment & Plan  History of chronic pancytopenia of unknown etiology.  Suspect partial contribution from alcoholic  cirrhosis in regard to thrombocytopenia.  He is seeing outpatient hematology oncology and has been scheduled for MRI abdomen.  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  Continue outpatient work-up    MEG (obstructive sleep apnea)  Assessment & Plan  Continue CPAP at bedtime    Alcoholic cirrhosis (HCC)  Assessment & Plan  History of chronic alcohol abuse. He has quit drinking and reports his last drink was August 2022.  Hepatic function with mildly elevated AST, alkaline phosphatase 164, total bilirubin 1.30, and albumin of 3.1, all appears chronic.  CT abdomen with evidence of portal hypertension and splenomegaly. There is no evidence of ascites.  There is additional finding of inflammation surrounding the inferior mesenteric artery that has increased since June 20, 2023, and is of uncertain etiology and significance.   Outpatient follow-up with GI    Essential hypertension  Assessment & Plan  History of hypertension however not on any antihypertensives be on Lasix 20 mg daily  Continue patient's home dose Lasix 20 mg    DM2 (diabetes mellitus, type 2) (HCC)  Assessment & Plan  Lab Results   Component Value Date    HGBA1C 7.3 (H) 08/18/2023       Recent Labs     12/19/23  0841 12/20/23  0039   POCGLU 131 105         Blood Sugar Average: Last 72 hrs:  (P) 105  Plan:  Continue patient's home dose Lantus 40 units and glimepiride          Medical Problems       Resolved Problems  Date Reviewed: 12/20/2023   None       Discharging Resident: Charo Hardy MD  Discharging Attending: No att. providers found  PCP: Collin Marcos MD  Admission Date:   Admission Orders (From admission, onward)       Ordered        12/20/23 1545  Inpatient Admission  Once            12/19/23 2256  Place in Observation  Once                          Discharge Date: 12/21/23    Consultations During Hospital Stay:  IP CONSULT TO ACUTE CARE SURGERY  IP CONSULT TO PHARMACY     Procedures Performed:   I&D    Significant Findings / Test  Results:       Incidental Findings:          Test Results Pending at Discharge (will require follow up):       Outpatient Tests Requested:      Complications:      Reason for Admission: Perineal abscess    Hospital Course:   Isael Avendano is a 68 y.o. male with a PMH of alcoholic cirrhosis, recurrent abscesses, T2DM on insulin, stage 3a CKD, chronic pancytopenia, MEG, and HTN who presents with swelling of the perineum. He states he noticed increasing swelling involving his perineum five days ago with progressive tenderness. The pain is particularly worse with movement and with contact with clothing and improved when sitting down. He had an EGD and colonoscopy performed by his gastroenterologist Dr. De La Garza who recommended that the patient seek medical treatment for his perineal complaint following the procedures. The patient denies any fever or chills. He does endorse a history of frequent abscesses involving his anterior and inner thighs that required incision and drainage and antibiotics.  He states that the exact etiology of these abscesses is unknown, Denies a history of frequent infections and abscesses during childhood.   In the ED, the patient met sepsis criteria with tachycardia and leukopenia.  He was given a half liter bolus of normal saline.  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 General surgery was consulted and performed bedside incision and drainage. He was given a dose of cefepime and vancomycin in the ED.    During the course of the hospital stay patient received total of 3 days of antibiotics.  Patient reported feeling significantly better after I&D and remained afebrile throughout.  Mentions that erythema and swelling has significantly reduced, and does not endorse having any pain.  Patient was clinically stable and wanted to be discharged home.        Please see above list of diagnoses and related plan for additional information.      Condition at Discharge: stable    Discharge Day Visit / Exam:   Subjective:    Vitals: Blood Pressure: 119/58 (12/21/23 0721)  Pulse: 89 (12/21/23 0721)  Temperature: 98.5 °F (36.9 °C) (12/21/23 0721)  Temp Source: Oral (12/21/23 0721)  Respirations: 18 (12/21/23 0721)  SpO2: 94 % (12/21/23 0721)  Exam:   Physical Exam  Vitals and nursing note reviewed.   Constitutional:       General: He is not in acute distress.     Appearance: He is well-developed.   HENT:      Head: Normocephalic and atraumatic.   Eyes:      Conjunctiva/sclera: Conjunctivae normal.   Cardiovascular:      Rate and Rhythm: Normal rate and regular rhythm.      Heart sounds: No murmur heard.  Pulmonary:      Effort: Pulmonary effort is normal. No respiratory distress.      Breath sounds: Normal breath sounds.   Abdominal:      Palpations: Abdomen is soft.      Tenderness: There is no abdominal tenderness.   Musculoskeletal:         General: No swelling.      Cervical back: Neck supple.   Skin:     General: Skin is warm and dry.      Capillary Refill: Capillary refill takes less than 2 seconds.      Findings: Lesion (Healing perineal incision post abscess drainage) present.   Neurological:      Mental Status: He is alert.   Psychiatric:         Mood and Affect: Mood normal.          Discussion with Family:     Discharge instructions/Information to patient and family:   See after visit summary for information provided to patient and family.      Provisions for Follow-Up Care:  See after visit summary for information related to follow-up care and any pertinent home health orders.      Mobility at time of Discharge:   Basic Mobility Inpatient Raw Score: 24  JH-HLM Goal: 8: Walk 250 feet or more  JH-HLM Achieved: 8: Walk 250 feet ot more       Disposition:   Home    Planned Readmission: no    Discharge Medications:  See after visit summary for reconciled discharge medications provided to patient and/or family.      **Please Note: This note may have  been constructed using a voice recognition system**

## 2023-12-22 PROCEDURE — 88305 TISSUE EXAM BY PATHOLOGIST: CPT | Performed by: STUDENT IN AN ORGANIZED HEALTH CARE EDUCATION/TRAINING PROGRAM

## 2023-12-24 LAB
BACTERIA BLD CULT: NORMAL
BACTERIA BLD CULT: NORMAL

## 2023-12-25 LAB
BACTERIA BLD CULT: NORMAL
BACTERIA BLD CULT: NORMAL

## 2023-12-26 ENCOUNTER — OFFICE VISIT (OUTPATIENT)
Dept: PHYSICAL THERAPY | Facility: CLINIC | Age: 68
End: 2023-12-26
Payer: MEDICARE

## 2023-12-26 DIAGNOSIS — Z96.612 AFTERCARE FOLLOWING LEFT SHOULDER JOINT REPLACEMENT SURGERY: Primary | ICD-10-CM

## 2023-12-26 DIAGNOSIS — Z47.1 AFTERCARE FOLLOWING LEFT SHOULDER JOINT REPLACEMENT SURGERY: Primary | ICD-10-CM

## 2023-12-26 PROCEDURE — 97112 NEUROMUSCULAR REEDUCATION: CPT

## 2023-12-26 PROCEDURE — 97110 THERAPEUTIC EXERCISES: CPT

## 2023-12-26 NOTE — PROGRESS NOTES
Daily Note     Today's date: 2023  Patient name: Isael Avendano  : 1955  MRN: [id number]  Referring provider: Pk Soto PA-C  Dx:   Encounter Diagnosis     ICD-10-CM    1. Aftercare following left shoulder joint replacement surgery  Z47.1     Z96.612                        Subjective: Pt reports overall improvements in his shoulder; no new c/o or pain.       Objective: See treatment diary below      Assessment: Tolerated treatment well. Denied pain during and post session. Pt cont to struggle with AROM OH and ER but unlikely this ROM will improve greatly from current measurements. Patient is moving functionally with no pain. Cont OH strengthening as tolerated for upcoming D/C next session.       Plan: Continue per plan of care.      Precautions: see protocol   Past Medical History:   Diagnosis Date    Arrhythmia     Chronic kidney disease     COVID 2020    CPAP (continuous positive airway pressure) dependence     Diabetes mellitus (HCC)     History of echocardiogram 2017    showed EF of 50-55 percentWith moderate LVH and left ventricle diastolic dysfunction. Left atrium was moderately enlarged. Trace MR noted.     History of Holter monitoring 2017    showed baseline rhythm of sinus origin with an average heart it of 61 bpm. The lowest heart rate was 49 and the highest heart rate was 10 8 bpm. There were rare single VPCs, and frequent PACs representing 3.2% of total beats. There were several episodes of sinus arrhythmias with sinus bradycardia and heart rate ranging from 40-90 bpm. No sustained dysrhythmias, or pauses noted. The patient did not    History of transfusion 2023    platelets    Liver disease     cirhosis    Obese     Sleep apnea      SOC: 2023  FOTO: 2023  POC Expiration: 23   Daily Treatment Log:  Date 23   Visit # 21 22 23 24 (FOTO)  25   Manual                There Exer 30' 30' 30'  30' 35'   Pulley     3'  "   UBE Alt 1'fwd/bwd for 4'  2' fwd/2' retro  2' fwd/2' retro  2' fwd/2' retro  2' fwd/2'retro   Std cane abd AAROM W/ eccentric return 2x10        Standing B/L ER  2x10  SL ER 2# 2x10    2x10 (no weight to increase Range)     AROM flex in supine  2# 2x10 2# 2x10  2# 2x10 2# 2x10 2# 2x10    Supine chest press   2# 2x10  2# 2x10 2# 2x10  2# 2x10    AROM S/L abd  2# 2x10  2# 2x10  2# 2x10  2# 2x10    Bicep curl  3# 2x10     3# 2x10    Wall slides   Uni 5\"x10 flex/abd  Uni 5\"x10 flex/abd   Uni 5\"x10 flex/abd   Uni flex/abd 2x10 5\"    Triceps press      Blue tube 2x10    S/L ER 1# 1x10   2# 2x10      Standing AROM flex     1x10 1x10    Stadning AROM abd     1x10  1x10    Objective measures         HEP        Ther Activ     10'   OH pball dribble/catch at wall   2x15   2x15     OH carry in available range   1# 25\"x2       Ball roll up wall   2x10  1# CW 2x10  1# 2x10  1# 2x10   Cone stacking in OH shelf      2x10 2nd shelf                   NMReed 10' 10' 10'  10'     Scap rows tubing Blue 2x10  Page 12# 2x10  Blue tubing 2x10  Blue tubing 2x10 Blue tubing 2x10    B shldr ext to hip w/ tube Blue 2x10  [place] 5# 2x10  Blue tubing 2x10  Blue tubing 2x10 Blue tubing 2x10   Supine horiz abd/add   2# 1x10  2# 2x10  2# 2x10     IR/ER walk outs vs tubing  YTB for ER 2x10  RTB for IR 2x10                       Modalities                                HEP:   Access Code: [email or web address]  URL: [email or web address]  Date: 12/07/2023  Prepared by: Tarah Martin     Exercises  - Shoulder External Rotation and Scapular Retraction  - 1 x daily - 7 x weekly - 2 sets - 10 reps  - Shoulder Flexion Wall Slide with Towel  - 1 x daily - 7 x weekly - 1 sets - 10 reps - 5 sec hold  - Standing shoulder flexion  - 1 x daily - 7 x weekly - 1 sets - 10 reps  - Shoulder Abduction - Thumbs Up  - 1 x daily - 7 x weekly - 1 sets - 10 reps  - Supine Shoulder Flexion Extension Full Range AROM  - 1 x daily - 7 x weekly - 2 sets - 10 reps  - " Sidelying Shoulder Abduction Full Range of Motion  - 1 x daily - 7 x weekly - 2 sets - 10 reps  - Sidelying Shoulder External Rotation AROM  - 1 x daily - 7 x weekly - 1 sets - 10 reps

## 2023-12-28 ENCOUNTER — OFFICE VISIT (OUTPATIENT)
Dept: PHYSICAL THERAPY | Facility: CLINIC | Age: 68
End: 2023-12-28
Payer: MEDICARE

## 2023-12-28 DIAGNOSIS — Z96.612 AFTERCARE FOLLOWING LEFT SHOULDER JOINT REPLACEMENT SURGERY: Primary | ICD-10-CM

## 2023-12-28 DIAGNOSIS — Z47.1 AFTERCARE FOLLOWING LEFT SHOULDER JOINT REPLACEMENT SURGERY: Primary | ICD-10-CM

## 2023-12-28 PROCEDURE — 97110 THERAPEUTIC EXERCISES: CPT

## 2023-12-28 NOTE — PROGRESS NOTES
PT Discharge    Today's date: 2023  Patient name: Isael Avendano  : 1955  MRN: [id number]  Referring provider: Pk Soto PA-C  Dx:   Encounter Diagnosis     ICD-10-CM    1. Aftercare following left shoulder joint replacement surgery  Z47.1     Z96.612                      Assessment  Assessment details: Isael Avendano is a 67 y.o. male who has completed 26 sessions of OPPT s/p Revision of L shldr TSA secondary to complications with infections. Patient has met most functional goals with the exception of performing OH activities which he is aware may not return to due to nature of his surgical procedure. HEP was updated to address continuing with strength and ROM of shoulder to prevent loss of function. He will be discharged today.   Impairments: abnormal or restricted ROM and impaired physical strength    Goals  Short Term Goals to be accomplished in 4 weeks:  STG1: Pt will be I with HEP to maximize progress between therapy sessions ---MET  STG2: Pt will be I with posture management --MET  STG3: Pt will demo shoulder flexion and abduction PROM of at least 90 deg to progress towards AAROM and AROM ---MET  STG4: Pt will demo 1/2 MMT strength in shoulder ---unable to test at this time per protocol   STG5: Pt will report pain 2/10 in shoulder or less at worst --MET     Long Term Goals to be accomplished in 12 weeks:   LTG1: Pt will demo full cervical AROM to prevent impingement in L shoulder MET  LTG2: Pt will return to work/household ADLs 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 MET  LTG3: Pt will demo shoulder strength WNL to allow lifting/carrying. MET  LTG4: Pt will demo at least 120 deg of shoulder AROM to improve self care (showering) and household ADLs (opening car door). - partially met         Subjective Evaluation    History of Present Illness  Date of surgery: 2023  Mechanism of injury: DC: Patient reports he is able to functional without pain with regards to L shoulder. Note  some difficulty remains with reaching overhead.    Patient Goals  Patient goals for therapy: decreased pain, increased motion, increased strength, independence with ADLs/IADLs and return to sport/leisure activities    Pain  No pain reported  Progression: improved (progressing well through protocol with no pain reported at this time w/ AAROM)    Social Support  Lives with: spouse    Hand dominance: right      Diagnostic Tests  X-ray: normal (hardware in place)  Treatments  Previous treatment: physical therapy        Objective    Objective:     A/PROM:    R   L  Shoulder flexion (supine) 165A   1110A/140AA  Shoulder abduction  170   100/130AA  Shoulder ER@90  Mod restrepo  Severe restrepo in B/L ER in sitting  Shoulder IR   TBA   TBA      STRENGTH:    R   L    Shoulder flexion  5/5   4-/5  Shoulder abduction  5/5   4-/5  Shoulder ER@90  5/5   3-/5  Shoulder IR   5/5   3+/5    Posture: Pt's sitting posture is WNL     Cervical Screen: cervical AROM limitations  Flexion  WNL  Extension Min restrepo  R rotation WNL  L rotation WNL  R sidebend WNL  L sidebend WNL           Precautions: see protocol   Past Medical History:   Diagnosis Date    Arrhythmia     Chronic kidney disease     COVID 12/2020    CPAP (continuous positive airway pressure) dependence     Diabetes mellitus (HCC)     History of echocardiogram 11/14/2017    showed EF of 50-55 percentWith moderate LVH and left ventricle diastolic dysfunction. Left atrium was moderately enlarged. Trace MR noted.     History of Holter monitoring 11/21/2017    showed baseline rhythm of sinus origin with an average heart it of 61 bpm. The lowest heart rate was 49 and the highest heart rate was 10 8 bpm. There were rare single VPCs, and frequent PACs representing 3.2% of total beats. There were several episodes of sinus arrhythmias with sinus bradycardia and heart rate ranging from 40-90 bpm. No sustained dysrhythmias, or pauses noted. The patient did not    History of transfusion 04/2023     "platelets    Liver disease     cirhosis    Obese     Sleep apnea    SOC: 9/18/2023  FOTO: 12/18/2023  POC Expiration: 1/4/23   Daily Treatment Log:  Date 12/28/23 12/26/23   Visit # 26 DC/FOTO    25   Manual                There Exer 30'    35'   [place] 3'    3'    UBE     2' fwd/2'retro   Std cane abd AAROM        Standing B/L ER         AROM flex in supine      2# 2x10    Supine chest press      2# 2x10    AROM S/L abd         Bicep curl      3# 2x10    Wall slides  Uni/flex abd 2x10 5\"     Uni flex/abd 2x10 5\"    Triceps press      Blue tube 2x10    S/L ER        Standing AROM flex  1x10    1x10    Stadning AROM abd  1x10     1x10    Objective measures         HEP Updated        Ther Activ     10'   OH pball dribble/catch at wall         OH carry in available range         Ball roll up wall      1# 2x10   Cone stacking in OH shelf      2x10 2nd shelf                   NMReed        Scap rows tubing Blue 2x10     Blue tubing 2x10    B shldr ext to hip w/ tube Blue 2x10     Blue tubing 2x10   Supine horiz abd/add         IR/ER walk outs vs tubing                         Modalities                                HEP:     Access Code: [email or web address]  URL: [email or web address]  Date: 12/28/2023  Prepared by: Cyndy Rivera    Exercises  - Shoulder External Rotation and Scapular Retraction  - 1 x daily - 7 x weekly - 2 sets - 10 reps  - Standing Shoulder Abduction Slides at Wall  - 1 x daily - 7 x weekly - 1 sets - 10 reps  - Shoulder Flexion Wall Slide with Towel  - 1 x daily - 7 x weekly - 1 sets - 10 reps - 5 sec hold  - Standing shoulder flexion  - 1 x daily - 7 x weekly - 1 sets - 10 reps  - Shoulder Abduction - Thumbs Up  - 1 x daily - 7 x weekly - 1 sets - 10 reps  - Supine Shoulder Flexion Extension Full Range AROM  - 1 x daily - 7 x weekly - 2 sets - 10 reps  - Sidelying Shoulder Abduction Full Range of Motion  - 1 x daily - 7 x weekly - 2 sets - 10 reps  - Sidelying Shoulder External Rotation AROM  " - 1 x daily - 7 x weekly - 1 sets - 10 reps  - Standing Shoulder Row with Anchored Resistance  - 1 x daily - 7 x weekly - 2 sets - 10 reps  - Shoulder extension with resistance - Neutral  - 1 x daily - 7 x weekly - 2 sets - 10 reps

## 2024-01-01 ENCOUNTER — HOSPICE ADMISSION (OUTPATIENT)
Dept: HOME HOSPICE | Facility: HOSPICE | Age: 69
End: 2024-01-01
Payer: MEDICARE

## 2024-01-01 ENCOUNTER — APPOINTMENT (INPATIENT)
Dept: CT IMAGING | Facility: HOSPITAL | Age: 69
DRG: 871 | End: 2024-01-01
Payer: MEDICARE

## 2024-01-01 ENCOUNTER — APPOINTMENT (INPATIENT)
Dept: RADIOLOGY | Facility: HOSPITAL | Age: 69
DRG: 871 | End: 2024-01-01
Payer: MEDICARE

## 2024-01-01 ENCOUNTER — APPOINTMENT (INPATIENT)
Dept: ULTRASOUND IMAGING | Facility: HOSPITAL | Age: 69
DRG: 871 | End: 2024-01-01
Payer: MEDICARE

## 2024-01-01 ENCOUNTER — APPOINTMENT (INPATIENT)
Dept: NON INVASIVE DIAGNOSTICS | Facility: HOSPITAL | Age: 69
DRG: 871 | End: 2024-01-01
Payer: MEDICARE

## 2024-01-01 ENCOUNTER — HOSPITAL ENCOUNTER (INPATIENT)
Facility: HOSPITAL | Age: 69
LOS: 8 days | DRG: 871 | End: 2024-11-23
Attending: EMERGENCY MEDICINE | Admitting: INTERNAL MEDICINE
Payer: MEDICARE

## 2024-01-01 VITALS
RESPIRATION RATE: 14 BRPM | HEIGHT: 71 IN | SYSTOLIC BLOOD PRESSURE: 108 MMHG | HEART RATE: 116 BPM | OXYGEN SATURATION: 100 % | TEMPERATURE: 97.6 F | DIASTOLIC BLOOD PRESSURE: 58 MMHG | BODY MASS INDEX: 30.65 KG/M2 | WEIGHT: 218.92 LBS

## 2024-01-01 DIAGNOSIS — D69.6 THROMBOCYTOPENIA (HCC): ICD-10-CM

## 2024-01-01 DIAGNOSIS — R65.21 SEPTIC SHOCK (HCC): Primary | ICD-10-CM

## 2024-01-01 DIAGNOSIS — K70.31 ALCOHOLIC CIRRHOSIS OF LIVER WITH ASCITES (HCC): ICD-10-CM

## 2024-01-01 DIAGNOSIS — D64.9 ANEMIA: ICD-10-CM

## 2024-01-01 DIAGNOSIS — C22.0 HEPATOCELLULAR CARCINOMA (HCC): ICD-10-CM

## 2024-01-01 DIAGNOSIS — L89.303 PRESSURE INJURY OF BUTTOCK, STAGE 3, UNSPECIFIED LATERALITY (HCC): ICD-10-CM

## 2024-01-01 DIAGNOSIS — K72.90 DECOMPENSATED CIRRHOSIS (HCC): ICD-10-CM

## 2024-01-01 DIAGNOSIS — A41.9 SEPTIC SHOCK (HCC): Primary | ICD-10-CM

## 2024-01-01 DIAGNOSIS — K65.2 SBP (SPONTANEOUS BACTERIAL PERITONITIS) (HCC): ICD-10-CM

## 2024-01-01 DIAGNOSIS — K74.60 DECOMPENSATED CIRRHOSIS (HCC): ICD-10-CM

## 2024-01-01 DIAGNOSIS — G89.3 CANCER RELATED PAIN: ICD-10-CM

## 2024-01-01 DIAGNOSIS — R57.9 SHOCK (HCC): ICD-10-CM

## 2024-01-01 DIAGNOSIS — K70.30 ALCOHOLIC CIRRHOSIS, UNSPECIFIED WHETHER ASCITES PRESENT (HCC): ICD-10-CM

## 2024-01-01 DIAGNOSIS — E72.20 HYPERAMMONEMIA (HCC): ICD-10-CM

## 2024-01-01 DIAGNOSIS — R33.9 URINARY RETENTION: ICD-10-CM

## 2024-01-01 DIAGNOSIS — L03.90 CELLULITIS: ICD-10-CM

## 2024-01-01 LAB
ALBUMIN SERPL BCG-MCNC: 2.3 G/DL (ref 3.5–5)
ALBUMIN SERPL BCG-MCNC: 2.4 G/DL (ref 3.5–5)
ALBUMIN SERPL BCG-MCNC: 2.6 G/DL (ref 3.5–5)
ALBUMIN SERPL BCG-MCNC: 2.7 G/DL (ref 3.5–5)
ALBUMIN SERPL BCG-MCNC: 2.8 G/DL (ref 3.5–5)
ALBUMIN SERPL BCG-MCNC: 3.2 G/DL (ref 3.5–5)
ALBUMIN SERPL BCG-MCNC: 3.2 G/DL (ref 3.5–5)
ALBUMIN SERPL BCG-MCNC: 3.4 G/DL (ref 3.5–5)
ALBUMIN SERPL BCG-MCNC: 3.5 G/DL (ref 3.5–5)
ALP SERPL-CCNC: 138 U/L (ref 34–104)
ALP SERPL-CCNC: 173 U/L (ref 34–104)
ALP SERPL-CCNC: 181 U/L (ref 34–104)
ALP SERPL-CCNC: 189 U/L (ref 34–104)
ALP SERPL-CCNC: 205 U/L (ref 34–104)
ALP SERPL-CCNC: 214 U/L (ref 34–104)
ALP SERPL-CCNC: 236 U/L (ref 34–104)
ALP SERPL-CCNC: 236 U/L (ref 34–104)
ALP SERPL-CCNC: 259 U/L (ref 34–104)
ALT SERPL W P-5'-P-CCNC: 19 U/L (ref 7–52)
ALT SERPL W P-5'-P-CCNC: 22 U/L (ref 7–52)
ALT SERPL W P-5'-P-CCNC: 25 U/L (ref 7–52)
ALT SERPL W P-5'-P-CCNC: 26 U/L (ref 7–52)
ALT SERPL W P-5'-P-CCNC: 26 U/L (ref 7–52)
ALT SERPL W P-5'-P-CCNC: 28 U/L (ref 7–52)
ALT SERPL W P-5'-P-CCNC: 34 U/L (ref 7–52)
AMMONIA PLAS-SCNC: 74 UMOL/L (ref 18–72)
AMMONIA PLAS-SCNC: 83 UMOL/L (ref 18–72)
AMMONIA PLAS-SCNC: 92 UMOL/L (ref 18–72)
ANION GAP SERPL CALCULATED.3IONS-SCNC: 5 MMOL/L (ref 4–13)
ANION GAP SERPL CALCULATED.3IONS-SCNC: 6 MMOL/L (ref 4–13)
ANION GAP SERPL CALCULATED.3IONS-SCNC: 8 MMOL/L (ref 4–13)
ANISOCYTOSIS BLD QL SMEAR: PRESENT
ANISOCYTOSIS BLD QL SMEAR: PRESENT
AORTIC ROOT: 3.5 CM
AORTIC VALVE MEAN VELOCITY: 15.8 M/S
APICAL FOUR CHAMBER EJECTION FRACTION: 61 %
APPEARANCE FLD: CLEAR
APPEARANCE FLD: NORMAL
APTT PPP: 41 SECONDS (ref 23–34)
APTT PPP: 42 SECONDS (ref 23–34)
ASCENDING AORTA: 3.9 CM
AST SERPL W P-5'-P-CCNC: 25 U/L (ref 13–39)
AST SERPL W P-5'-P-CCNC: 26 U/L (ref 13–39)
AST SERPL W P-5'-P-CCNC: 27 U/L (ref 13–39)
AST SERPL W P-5'-P-CCNC: 28 U/L (ref 13–39)
AST SERPL W P-5'-P-CCNC: 28 U/L (ref 13–39)
AST SERPL W P-5'-P-CCNC: 30 U/L (ref 13–39)
AST SERPL W P-5'-P-CCNC: 31 U/L (ref 13–39)
AST SERPL W P-5'-P-CCNC: 32 U/L (ref 13–39)
AST SERPL W P-5'-P-CCNC: 36 U/L (ref 13–39)
ATRIAL RATE: 100 BPM
ATRIAL RATE: 114 BPM
AV AREA BY CONTINUOUS VTI: 2.5 CM2
AV AREA PEAK VELOCITY: 165.5 CM2
AV LVOT MEAN GRADIENT: 5 MMHG
AV LVOT PEAK GRADIENT: 10 MMHG
AV MEAN GRADIENT: 12 MMHG
AV PEAK GRADIENT: 22 MMHG
AV VALVE AREA: 2.47 CM2
AV VELOCITY RATIO: 0.67
BACTERIA BLD CULT: NORMAL
BACTERIA BLD CULT: NORMAL
BACTERIA SPEC BFLD CULT: NO GROWTH
BACTERIA SPEC BFLD CULT: NO GROWTH
BACTERIA UR CULT: ABNORMAL
BACTERIA UR CULT: ABNORMAL
BACTERIA UR QL AUTO: ABNORMAL /HPF
BASE EX.OXY STD BLDV CALC-SCNC: 71.2 % (ref 60–80)
BASE EX.OXY STD BLDV CALC-SCNC: 80.6 % (ref 60–80)
BASE EXCESS BLDV CALC-SCNC: -0.5 MMOL/L
BASE EXCESS BLDV CALC-SCNC: -3.8 MMOL/L
BASOPHILS # BLD AUTO: 0 THOUSANDS/ÂΜL (ref 0–0.1)
BASOPHILS # BLD AUTO: 0.01 THOUSANDS/ÂΜL (ref 0–0.1)
BASOPHILS # BLD AUTO: 0.01 THOUSANDS/ÂΜL (ref 0–0.1)
BASOPHILS # BLD AUTO: 0.02 THOUSANDS/ÂΜL (ref 0–0.1)
BASOPHILS # BLD MANUAL: 0 THOUSAND/UL (ref 0–0.1)
BASOPHILS NFR BLD AUTO: 0 % (ref 0–1)
BASOPHILS NFR MAR MANUAL: 0 % (ref 0–1)
BILIRUB DIRECT SERPL-MCNC: 1.04 MG/DL (ref 0–0.2)
BILIRUB DIRECT SERPL-MCNC: 1.44 MG/DL (ref 0–0.2)
BILIRUB SERPL-MCNC: 2.51 MG/DL (ref 0.2–1)
BILIRUB SERPL-MCNC: 2.65 MG/DL (ref 0.2–1)
BILIRUB SERPL-MCNC: 2.73 MG/DL (ref 0.2–1)
BILIRUB SERPL-MCNC: 2.93 MG/DL (ref 0.2–1)
BILIRUB SERPL-MCNC: 3.02 MG/DL (ref 0.2–1)
BILIRUB SERPL-MCNC: 3.39 MG/DL (ref 0.2–1)
BILIRUB SERPL-MCNC: 3.48 MG/DL (ref 0.2–1)
BILIRUB SERPL-MCNC: 3.52 MG/DL (ref 0.2–1)
BILIRUB SERPL-MCNC: 3.65 MG/DL (ref 0.2–1)
BILIRUB UR QL STRIP: NEGATIVE
BNP SERPL-MCNC: 234 PG/ML (ref 0–100)
BSA FOR ECHO PROCEDURE: 2.35 M2
BUN SERPL-MCNC: 32 MG/DL (ref 5–25)
BUN SERPL-MCNC: 34 MG/DL (ref 5–25)
BUN SERPL-MCNC: 35 MG/DL (ref 5–25)
BUN SERPL-MCNC: 36 MG/DL (ref 5–25)
BUN SERPL-MCNC: 36 MG/DL (ref 5–25)
BUN SERPL-MCNC: 37 MG/DL (ref 5–25)
BUN SERPL-MCNC: 38 MG/DL (ref 5–25)
BUN SERPL-MCNC: 39 MG/DL (ref 5–25)
BUN SERPL-MCNC: 41 MG/DL (ref 5–25)
BUN SERPL-MCNC: 48 MG/DL (ref 5–25)
BUN SERPL-MCNC: 50 MG/DL (ref 5–25)
BUN SERPL-MCNC: 58 MG/DL (ref 5–25)
BURR CELLS BLD QL SMEAR: PRESENT
BURR CELLS BLD QL SMEAR: PRESENT
CA-I BLD-SCNC: 1.12 MMOL/L (ref 1.12–1.32)
CA-I BLD-SCNC: 1.16 MMOL/L (ref 1.12–1.32)
CA-I BLD-SCNC: 1.21 MMOL/L (ref 1.12–1.32)
CA-I BLD-SCNC: 1.22 MMOL/L (ref 1.12–1.32)
CA-I BLD-SCNC: 1.24 MMOL/L (ref 1.12–1.32)
CA-I BLD-SCNC: 1.26 MMOL/L (ref 1.12–1.32)
CA-I BLD-SCNC: 1.26 MMOL/L (ref 1.12–1.32)
CALCIUM ALBUM COR SERPL-MCNC: 10.3 MG/DL (ref 8.3–10.1)
CALCIUM ALBUM COR SERPL-MCNC: 10.3 MG/DL (ref 8.3–10.1)
CALCIUM ALBUM COR SERPL-MCNC: 10.5 MG/DL (ref 8.3–10.1)
CALCIUM ALBUM COR SERPL-MCNC: 9.1 MG/DL (ref 8.3–10.1)
CALCIUM ALBUM COR SERPL-MCNC: 9.3 MG/DL (ref 8.3–10.1)
CALCIUM ALBUM COR SERPL-MCNC: 9.6 MG/DL (ref 8.3–10.1)
CALCIUM ALBUM COR SERPL-MCNC: 9.7 MG/DL (ref 8.3–10.1)
CALCIUM SERPL-MCNC: 10 MG/DL (ref 8.4–10.2)
CALCIUM SERPL-MCNC: 10.3 MG/DL (ref 8.4–10.2)
CALCIUM SERPL-MCNC: 8 MG/DL (ref 8.4–10.2)
CALCIUM SERPL-MCNC: 8.1 MG/DL (ref 8.4–10.2)
CALCIUM SERPL-MCNC: 8.2 MG/DL (ref 8.4–10.2)
CALCIUM SERPL-MCNC: 8.6 MG/DL (ref 8.4–10.2)
CALCIUM SERPL-MCNC: 9.2 MG/DL (ref 8.4–10.2)
CALCIUM SERPL-MCNC: 9.4 MG/DL (ref 8.4–10.2)
CALCIUM SERPL-MCNC: 9.5 MG/DL (ref 8.4–10.2)
CALCIUM SERPL-MCNC: 9.5 MG/DL (ref 8.4–10.2)
CALCIUM SERPL-MCNC: 9.7 MG/DL (ref 8.4–10.2)
CALCIUM SERPL-MCNC: 9.8 MG/DL (ref 8.4–10.2)
CHLORIDE SERPL-SCNC: 101 MMOL/L (ref 96–108)
CHLORIDE SERPL-SCNC: 104 MMOL/L (ref 96–108)
CHLORIDE SERPL-SCNC: 106 MMOL/L (ref 96–108)
CHLORIDE SERPL-SCNC: 106 MMOL/L (ref 96–108)
CHLORIDE SERPL-SCNC: 108 MMOL/L (ref 96–108)
CHLORIDE SERPL-SCNC: 108 MMOL/L (ref 96–108)
CHLORIDE SERPL-SCNC: 109 MMOL/L (ref 96–108)
CHLORIDE SERPL-SCNC: 110 MMOL/L (ref 96–108)
CHLORIDE SERPL-SCNC: 110 MMOL/L (ref 96–108)
CHLORIDE SERPL-SCNC: 112 MMOL/L (ref 96–108)
CHLORIDE SERPL-SCNC: 112 MMOL/L (ref 96–108)
CHLORIDE SERPL-SCNC: 114 MMOL/L (ref 96–108)
CLARITY UR: CLEAR
CO2 SERPL-SCNC: 20 MMOL/L (ref 21–32)
CO2 SERPL-SCNC: 21 MMOL/L (ref 21–32)
CO2 SERPL-SCNC: 22 MMOL/L (ref 21–32)
CO2 SERPL-SCNC: 22 MMOL/L (ref 21–32)
CO2 SERPL-SCNC: 23 MMOL/L (ref 21–32)
CO2 SERPL-SCNC: 23 MMOL/L (ref 21–32)
CO2 SERPL-SCNC: 24 MMOL/L (ref 21–32)
CO2 SERPL-SCNC: 26 MMOL/L (ref 21–32)
COLOR FLD: NORMAL
COLOR FLD: NORMAL
COLOR UR: YELLOW
CREAT SERPL-MCNC: 0.99 MG/DL (ref 0.6–1.3)
CREAT SERPL-MCNC: 1.03 MG/DL (ref 0.6–1.3)
CREAT SERPL-MCNC: 1.03 MG/DL (ref 0.6–1.3)
CREAT SERPL-MCNC: 1.07 MG/DL (ref 0.6–1.3)
CREAT SERPL-MCNC: 1.1 MG/DL (ref 0.6–1.3)
CREAT SERPL-MCNC: 1.11 MG/DL (ref 0.6–1.3)
CREAT SERPL-MCNC: 1.16 MG/DL (ref 0.6–1.3)
CREAT SERPL-MCNC: 1.17 MG/DL (ref 0.6–1.3)
CREAT SERPL-MCNC: 1.21 MG/DL (ref 0.6–1.3)
CREAT SERPL-MCNC: 1.29 MG/DL (ref 0.6–1.3)
CREAT SERPL-MCNC: 1.52 MG/DL (ref 0.6–1.3)
CREAT SERPL-MCNC: 1.67 MG/DL (ref 0.6–1.3)
DACRYOCYTES BLD QL SMEAR: PRESENT
DOP CALC AO PEAK VEL: 2.35 M/S
DOP CALC AO VTI: 42.66 CM
DOP CALC LVOT AREA: 3.14 CM2
DOP CALC LVOT CARDIAC INDEX: 4.35 L/MIN/M2
DOP CALC LVOT CARDIAC OUTPUT: 10.22 L/MIN
DOP CALC LVOT DIAMETER: 2 CM
DOP CALC LVOT PEAK VEL VTI: 33.57 CM
DOP CALC LVOT PEAK VEL: 1.58 M/S
DOP CALC LVOT STROKE INDEX: 44.7 ML/M2
DOP CALC LVOT STROKE VOLUME: 105.41
DOP CALC MV VTI: 55.65 CM
E WAVE DECELERATION TIME: 553 MS
E/A RATIO: 0.88
EOSINOPHIL # BLD AUTO: 0 THOUSAND/ÂΜL (ref 0–0.61)
EOSINOPHIL # BLD AUTO: 0 THOUSAND/ÂΜL (ref 0–0.61)
EOSINOPHIL # BLD AUTO: 0.04 THOUSAND/ÂΜL (ref 0–0.61)
EOSINOPHIL # BLD AUTO: 0.07 THOUSAND/ÂΜL (ref 0–0.61)
EOSINOPHIL # BLD AUTO: 0.1 THOUSAND/ÂΜL (ref 0–0.61)
EOSINOPHIL # BLD AUTO: 0.12 THOUSAND/ÂΜL (ref 0–0.61)
EOSINOPHIL # BLD MANUAL: 0 THOUSAND/UL (ref 0–0.4)
EOSINOPHIL NFR BLD AUTO: 0 % (ref 0–6)
EOSINOPHIL NFR BLD AUTO: 0 % (ref 0–6)
EOSINOPHIL NFR BLD AUTO: 1 % (ref 0–6)
EOSINOPHIL NFR BLD AUTO: 2 % (ref 0–6)
EOSINOPHIL NFR BLD MANUAL: 0 % (ref 0–6)
ERYTHROCYTE [DISTWIDTH] IN BLOOD BY AUTOMATED COUNT: 21.5 % (ref 11.6–15.1)
ERYTHROCYTE [DISTWIDTH] IN BLOOD BY AUTOMATED COUNT: 21.9 % (ref 11.6–15.1)
ERYTHROCYTE [DISTWIDTH] IN BLOOD BY AUTOMATED COUNT: 21.9 % (ref 11.6–15.1)
ERYTHROCYTE [DISTWIDTH] IN BLOOD BY AUTOMATED COUNT: 22 % (ref 11.6–15.1)
ERYTHROCYTE [DISTWIDTH] IN BLOOD BY AUTOMATED COUNT: 22.1 % (ref 11.6–15.1)
ERYTHROCYTE [DISTWIDTH] IN BLOOD BY AUTOMATED COUNT: 22.2 % (ref 11.6–15.1)
ERYTHROCYTE [DISTWIDTH] IN BLOOD BY AUTOMATED COUNT: 22.2 % (ref 11.6–15.1)
ERYTHROCYTE [DISTWIDTH] IN BLOOD BY AUTOMATED COUNT: 22.3 % (ref 11.6–15.1)
ERYTHROCYTE [DISTWIDTH] IN BLOOD BY AUTOMATED COUNT: 22.5 % (ref 11.6–15.1)
FRACTIONAL SHORTENING: 44 (ref 28–44)
GFR SERPL CREATININE-BSD FRML MDRD: 40 ML/MIN/1.73SQ M
GFR SERPL CREATININE-BSD FRML MDRD: 45 ML/MIN/1.73SQ M
GFR SERPL CREATININE-BSD FRML MDRD: 55 ML/MIN/1.73SQ M
GFR SERPL CREATININE-BSD FRML MDRD: 59 ML/MIN/1.73SQ M
GFR SERPL CREATININE-BSD FRML MDRD: 62 ML/MIN/1.73SQ M
GFR SERPL CREATININE-BSD FRML MDRD: 63 ML/MIN/1.73SQ M
GFR SERPL CREATININE-BSD FRML MDRD: 66 ML/MIN/1.73SQ M
GFR SERPL CREATININE-BSD FRML MDRD: 67 ML/MIN/1.73SQ M
GFR SERPL CREATININE-BSD FRML MDRD: 69 ML/MIN/1.73SQ M
GFR SERPL CREATININE-BSD FRML MDRD: 72 ML/MIN/1.73SQ M
GFR SERPL CREATININE-BSD FRML MDRD: 72 ML/MIN/1.73SQ M
GFR SERPL CREATININE-BSD FRML MDRD: 76 ML/MIN/1.73SQ M
GLUCOSE SERPL-MCNC: 112 MG/DL (ref 65–140)
GLUCOSE SERPL-MCNC: 114 MG/DL (ref 65–140)
GLUCOSE SERPL-MCNC: 167 MG/DL (ref 65–140)
GLUCOSE SERPL-MCNC: 171 MG/DL (ref 65–140)
GLUCOSE SERPL-MCNC: 171 MG/DL (ref 65–140)
GLUCOSE SERPL-MCNC: 172 MG/DL (ref 65–140)
GLUCOSE SERPL-MCNC: 175 MG/DL (ref 65–140)
GLUCOSE SERPL-MCNC: 182 MG/DL (ref 65–140)
GLUCOSE SERPL-MCNC: 184 MG/DL (ref 65–140)
GLUCOSE SERPL-MCNC: 187 MG/DL (ref 65–140)
GLUCOSE SERPL-MCNC: 193 MG/DL (ref 65–140)
GLUCOSE SERPL-MCNC: 198 MG/DL (ref 65–140)
GLUCOSE SERPL-MCNC: 206 MG/DL (ref 65–140)
GLUCOSE SERPL-MCNC: 208 MG/DL (ref 65–140)
GLUCOSE SERPL-MCNC: 213 MG/DL (ref 65–140)
GLUCOSE SERPL-MCNC: 214 MG/DL (ref 65–140)
GLUCOSE SERPL-MCNC: 215 MG/DL (ref 65–140)
GLUCOSE SERPL-MCNC: 226 MG/DL (ref 65–140)
GLUCOSE SERPL-MCNC: 229 MG/DL (ref 65–140)
GLUCOSE SERPL-MCNC: 233 MG/DL (ref 65–140)
GLUCOSE SERPL-MCNC: 237 MG/DL (ref 65–140)
GLUCOSE SERPL-MCNC: 241 MG/DL (ref 65–140)
GLUCOSE SERPL-MCNC: 245 MG/DL (ref 65–140)
GLUCOSE SERPL-MCNC: 246 MG/DL (ref 65–140)
GLUCOSE SERPL-MCNC: 247 MG/DL (ref 65–140)
GLUCOSE SERPL-MCNC: 248 MG/DL (ref 65–140)
GLUCOSE SERPL-MCNC: 249 MG/DL (ref 65–140)
GLUCOSE SERPL-MCNC: 258 MG/DL (ref 65–140)
GLUCOSE SERPL-MCNC: 266 MG/DL (ref 65–140)
GLUCOSE SERPL-MCNC: 286 MG/DL (ref 65–140)
GLUCOSE SERPL-MCNC: 293 MG/DL (ref 65–140)
GLUCOSE SERPL-MCNC: 304 MG/DL (ref 65–140)
GLUCOSE SERPL-MCNC: 311 MG/DL (ref 65–140)
GLUCOSE SERPL-MCNC: 317 MG/DL (ref 65–140)
GLUCOSE SERPL-MCNC: 322 MG/DL (ref 65–140)
GLUCOSE SERPL-MCNC: 326 MG/DL (ref 65–140)
GLUCOSE SERPL-MCNC: 332 MG/DL (ref 65–140)
GLUCOSE SERPL-MCNC: 333 MG/DL (ref 65–140)
GLUCOSE SERPL-MCNC: 342 MG/DL (ref 65–140)
GLUCOSE SERPL-MCNC: 349 MG/DL (ref 65–140)
GLUCOSE SERPL-MCNC: 365 MG/DL (ref 65–140)
GLUCOSE SERPL-MCNC: 386 MG/DL (ref 65–140)
GLUCOSE SERPL-MCNC: 395 MG/DL (ref 65–140)
GLUCOSE SERPL-MCNC: 406 MG/DL (ref 65–140)
GLUCOSE SERPL-MCNC: 88 MG/DL (ref 65–140)
GLUCOSE SERPL-MCNC: 91 MG/DL (ref 65–140)
GLUCOSE SERPL-MCNC: 92 MG/DL (ref 65–140)
GLUCOSE SERPL-MCNC: 92 MG/DL (ref 65–140)
GLUCOSE SERPL-MCNC: 96 MG/DL (ref 65–140)
GLUCOSE SERPL-MCNC: 99 MG/DL (ref 65–140)
GLUCOSE UR STRIP-MCNC: NEGATIVE MG/DL
GRAM STN SPEC: NORMAL
GRAM STN SPEC: NORMAL
HCO3 BLDV-SCNC: 20.7 MMOL/L (ref 24–30)
HCO3 BLDV-SCNC: 23.2 MMOL/L (ref 24–30)
HCT VFR BLD AUTO: 23.3 % (ref 36.5–49.3)
HCT VFR BLD AUTO: 23.7 % (ref 36.5–49.3)
HCT VFR BLD AUTO: 23.8 % (ref 36.5–49.3)
HCT VFR BLD AUTO: 25.1 % (ref 36.5–49.3)
HCT VFR BLD AUTO: 25.6 % (ref 36.5–49.3)
HCT VFR BLD AUTO: 25.6 % (ref 36.5–49.3)
HCT VFR BLD AUTO: 26 % (ref 36.5–49.3)
HCT VFR BLD AUTO: 26.5 % (ref 36.5–49.3)
HCT VFR BLD AUTO: 28.5 % (ref 36.5–49.3)
HELMET CELLS BLD QL SMEAR: PRESENT
HELMET CELLS BLD QL SMEAR: PRESENT
HGB BLD-MCNC: 7 G/DL (ref 12–17)
HGB BLD-MCNC: 7.3 G/DL (ref 12–17)
HGB BLD-MCNC: 7.4 G/DL (ref 12–17)
HGB BLD-MCNC: 7.4 G/DL (ref 12–17)
HGB BLD-MCNC: 7.7 G/DL (ref 12–17)
HGB BLD-MCNC: 7.8 G/DL (ref 12–17)
HGB BLD-MCNC: 7.9 G/DL (ref 12–17)
HGB BLD-MCNC: 8 G/DL (ref 12–17)
HGB BLD-MCNC: 8.9 G/DL (ref 12–17)
HGB UR QL STRIP.AUTO: NEGATIVE
HISTIOCYTES NFR FLD: 19 %
HISTIOCYTES NFR FLD: 7 %
HYALINE CASTS #/AREA URNS LPF: ABNORMAL /LPF
HYPERCHROMIA BLD QL SMEAR: PRESENT
HYPERCHROMIA BLD QL SMEAR: PRESENT
IMM GRANULOCYTES # BLD AUTO: 0.03 THOUSAND/UL (ref 0–0.2)
IMM GRANULOCYTES # BLD AUTO: 0.04 THOUSAND/UL (ref 0–0.2)
IMM GRANULOCYTES # BLD AUTO: 0.04 THOUSAND/UL (ref 0–0.2)
IMM GRANULOCYTES # BLD AUTO: 0.07 THOUSAND/UL (ref 0–0.2)
IMM GRANULOCYTES NFR BLD AUTO: 0 % (ref 0–2)
IMM GRANULOCYTES NFR BLD AUTO: 1 % (ref 0–2)
IMM GRANULOCYTES NFR BLD AUTO: 1 % (ref 0–2)
INR PPP: 1.77 (ref 0.85–1.19)
INR PPP: 2 (ref 0.85–1.19)
INR PPP: 2.01 (ref 0.85–1.19)
INR PPP: 2.01 (ref 0.85–1.19)
INR PPP: 2.1 (ref 0.85–1.19)
INR PPP: 2.21 (ref 0.85–1.19)
INR PPP: 2.3 (ref 0.85–1.19)
INR PPP: 2.57 (ref 0.85–1.19)
INTERVENTRICULAR SEPTUM IN DIASTOLE (PARASTERNAL SHORT AXIS VIEW): 0.8 CM
INTERVENTRICULAR SEPTUM: 0.8 CM (ref 0.6–1.1)
KETONES UR STRIP-MCNC: NEGATIVE MG/DL
LAAS-AP2: 22 CM2
LAAS-AP4: 24.1 CM2
LACTATE SERPL-SCNC: 1.9 MMOL/L (ref 0.5–2)
LACTATE SERPL-SCNC: 3 MMOL/L (ref 0.5–2)
LACTATE SERPL-SCNC: 3.2 MMOL/L (ref 0.5–2)
LEFT ATRIUM AREA SYSTOLE SINGLE PLANE A4C: 23.6 CM2
LEFT ATRIUM SIZE: 3.6 CM
LEFT ATRIUM VOLUME (MOD BIPLANE): 78 ML
LEFT ATRIUM VOLUME INDEX (MOD BIPLANE): 33.2 ML/M2
LEFT INTERNAL DIMENSION IN SYSTOLE: 2.5 CM (ref 2.1–4)
LEFT VENTRICULAR INTERNAL DIMENSION IN DIASTOLE: 4.5 CM (ref 3.5–6)
LEFT VENTRICULAR POSTERIOR WALL IN END DIASTOLE: 0.8 CM
LEFT VENTRICULAR STROKE VOLUME: 73 ML
LEUKOCYTE ESTERASE UR QL STRIP: ABNORMAL
LVSV (TEICH): 73 ML
LYMPHOCYTES # BLD AUTO: 0.54 THOUSAND/UL (ref 0.6–4.47)
LYMPHOCYTES # BLD AUTO: 0.54 THOUSANDS/ÂΜL (ref 0.6–4.47)
LYMPHOCYTES # BLD AUTO: 0.56 THOUSANDS/ÂΜL (ref 0.6–4.47)
LYMPHOCYTES # BLD AUTO: 0.58 THOUSANDS/ÂΜL (ref 0.6–4.47)
LYMPHOCYTES # BLD AUTO: 0.58 THOUSANDS/ÂΜL (ref 0.6–4.47)
LYMPHOCYTES # BLD AUTO: 0.6 THOUSANDS/ÂΜL (ref 0.6–4.47)
LYMPHOCYTES # BLD AUTO: 0.72 THOUSANDS/ÂΜL (ref 0.6–4.47)
LYMPHOCYTES # BLD AUTO: 7 % (ref 14–44)
LYMPHOCYTES NFR BLD AUTO: 10 %
LYMPHOCYTES NFR BLD AUTO: 6 % (ref 14–44)
LYMPHOCYTES NFR BLD AUTO: 7 %
LYMPHOCYTES NFR BLD AUTO: 7 % (ref 14–44)
LYMPHOCYTES NFR BLD AUTO: 9 % (ref 14–44)
MAGNESIUM SERPL-MCNC: 1.8 MG/DL (ref 1.9–2.7)
MAGNESIUM SERPL-MCNC: 2 MG/DL (ref 1.9–2.7)
MAGNESIUM SERPL-MCNC: 2.1 MG/DL (ref 1.9–2.7)
MAGNESIUM SERPL-MCNC: 2.2 MG/DL (ref 1.9–2.7)
MAGNESIUM SERPL-MCNC: 2.3 MG/DL (ref 1.9–2.7)
MAGNESIUM SERPL-MCNC: 2.5 MG/DL (ref 1.9–2.7)
MCH RBC QN AUTO: 24.6 PG (ref 26.8–34.3)
MCH RBC QN AUTO: 24.8 PG (ref 26.8–34.3)
MCH RBC QN AUTO: 25.1 PG (ref 26.8–34.3)
MCH RBC QN AUTO: 25.3 PG (ref 26.8–34.3)
MCH RBC QN AUTO: 25.8 PG (ref 26.8–34.3)
MCHC RBC AUTO-ENTMCNC: 29.4 G/DL (ref 31.4–37.4)
MCHC RBC AUTO-ENTMCNC: 29.5 G/DL (ref 31.4–37.4)
MCHC RBC AUTO-ENTMCNC: 30 G/DL (ref 31.4–37.4)
MCHC RBC AUTO-ENTMCNC: 30.1 G/DL (ref 31.4–37.4)
MCHC RBC AUTO-ENTMCNC: 30.8 G/DL (ref 31.4–37.4)
MCHC RBC AUTO-ENTMCNC: 30.8 G/DL (ref 31.4–37.4)
MCHC RBC AUTO-ENTMCNC: 30.9 G/DL (ref 31.4–37.4)
MCHC RBC AUTO-ENTMCNC: 31.1 G/DL (ref 31.4–37.4)
MCHC RBC AUTO-ENTMCNC: 31.2 G/DL (ref 31.4–37.4)
MCV RBC AUTO: 82 FL (ref 82–98)
MCV RBC AUTO: 83 FL (ref 82–98)
MCV RBC AUTO: 84 FL (ref 82–98)
MCV RBC AUTO: 84 FL (ref 82–98)
MICROCYTES BLD QL AUTO: PRESENT
MONOCYTES # BLD AUTO: 0.16 THOUSAND/UL (ref 0–1.22)
MONOCYTES # BLD AUTO: 0.36 THOUSAND/ÂΜL (ref 0.17–1.22)
MONOCYTES # BLD AUTO: 0.4 THOUSAND/ÂΜL (ref 0.17–1.22)
MONOCYTES # BLD AUTO: 0.43 THOUSAND/ÂΜL (ref 0.17–1.22)
MONOCYTES # BLD AUTO: 0.46 THOUSAND/ÂΜL (ref 0.17–1.22)
MONOCYTES # BLD AUTO: 0.47 THOUSAND/ÂΜL (ref 0.17–1.22)
MONOCYTES # BLD AUTO: 0.52 THOUSAND/ÂΜL (ref 0.17–1.22)
MONOCYTES NFR BLD AUTO: 4 % (ref 4–12)
MONOCYTES NFR BLD AUTO: 5 % (ref 4–12)
MONOCYTES NFR BLD AUTO: 6 % (ref 4–12)
MONOCYTES NFR BLD AUTO: 6 % (ref 4–12)
MONOCYTES NFR BLD: 2 % (ref 4–12)
MRSA NOSE QL CULT: ABNORMAL
MRSA NOSE QL CULT: ABNORMAL
MV E'TISSUE VEL-SEP: 8 CM/S
MV PEAK A VEL: 1.6 M/S
MV PEAK E VEL: 141 CM/S
MV PEAK GRADIENT: 8 MMHG
MV STENOSIS PRESSURE HALF TIME: 160 MS
MV VALVE AREA BY CONTINUITY EQUATION: 1.89 CM2
MV VALVE AREA P 1/2 METHOD: 1.38
NASAL CANNULA: 2
NEUTROPHILS # BLD AUTO: 6.32 THOUSANDS/ÂΜL (ref 1.85–7.62)
NEUTROPHILS # BLD AUTO: 6.9 THOUSANDS/ÂΜL (ref 1.85–7.62)
NEUTROPHILS # BLD AUTO: 7.37 THOUSANDS/ÂΜL (ref 1.85–7.62)
NEUTROPHILS # BLD AUTO: 8.25 THOUSANDS/ÂΜL (ref 1.85–7.62)
NEUTROPHILS # BLD AUTO: 8.91 THOUSANDS/ÂΜL (ref 1.85–7.62)
NEUTROPHILS # BLD AUTO: 9.11 THOUSANDS/ÂΜL (ref 1.85–7.62)
NEUTROPHILS # BLD MANUAL: 7.07 THOUSAND/UL (ref 1.85–7.62)
NEUTS BAND NFR FLD MANUAL: 2 %
NEUTS SEG NFR BLD AUTO: 71 %
NEUTS SEG NFR BLD AUTO: 82 % (ref 43–75)
NEUTS SEG NFR BLD AUTO: 84 %
NEUTS SEG NFR BLD AUTO: 86 % (ref 43–75)
NEUTS SEG NFR BLD AUTO: 88 % (ref 43–75)
NEUTS SEG NFR BLD AUTO: 88 % (ref 43–75)
NEUTS SEG NFR BLD AUTO: 89 % (ref 43–75)
NEUTS SEG NFR BLD AUTO: 89 % (ref 43–75)
NEUTS SEG NFR BLD AUTO: 91 % (ref 43–75)
NITRITE UR QL STRIP: NEGATIVE
NON-SQ EPI CELLS URNS QL MICRO: ABNORMAL /HPF
NRBC BLD AUTO-RTO: 0 /100 WBCS
O2 CT BLDV-SCNC: 8.1 ML/DL
O2 CT BLDV-SCNC: 9.8 ML/DL
OVALOCYTES BLD QL SMEAR: PRESENT
OVALOCYTES BLD QL SMEAR: PRESENT
P AXIS: 23 DEGREES
PATH REV: NO
PCO2 BLDV: 33.7 MM HG (ref 42–50)
PCO2 BLDV: 35.2 MM HG (ref 42–50)
PH BLDV: 7.39 [PH] (ref 7.3–7.4)
PH BLDV: 7.46 [PH] (ref 7.3–7.4)
PH UR STRIP.AUTO: 5 [PH]
PHOSPHATE SERPL-MCNC: 1.9 MG/DL (ref 2.3–4.1)
PHOSPHATE SERPL-MCNC: 2.5 MG/DL (ref 2.3–4.1)
PHOSPHATE SERPL-MCNC: 2.6 MG/DL (ref 2.3–4.1)
PHOSPHATE SERPL-MCNC: 2.7 MG/DL (ref 2.3–4.1)
PHOSPHATE SERPL-MCNC: 2.8 MG/DL (ref 2.3–4.1)
PHOSPHATE SERPL-MCNC: 2.8 MG/DL (ref 2.3–4.1)
PHOSPHATE SERPL-MCNC: 2.9 MG/DL (ref 2.3–4.1)
PHOSPHATE SERPL-MCNC: 3.8 MG/DL (ref 2.3–4.1)
PLATELET # BLD AUTO: 52 THOUSANDS/UL (ref 149–390)
PLATELET # BLD AUTO: 54 THOUSANDS/UL (ref 149–390)
PLATELET # BLD AUTO: 54 THOUSANDS/UL (ref 149–390)
PLATELET # BLD AUTO: 59 THOUSANDS/UL (ref 149–390)
PLATELET # BLD AUTO: 60 THOUSANDS/UL (ref 149–390)
PLATELET # BLD AUTO: 62 THOUSANDS/UL (ref 149–390)
PLATELET # BLD AUTO: 70 THOUSANDS/UL (ref 149–390)
PLATELET # BLD AUTO: 71 THOUSANDS/UL (ref 149–390)
PLATELET # BLD AUTO: 81 THOUSANDS/UL (ref 149–390)
PLATELET BLD QL SMEAR: ABNORMAL
PLATELET BLD QL SMEAR: ABNORMAL
PO2 BLDV: 40.9 MM HG (ref 35–45)
PO2 BLDV: 48.7 MM HG (ref 35–45)
POIKILOCYTOSIS BLD QL SMEAR: PRESENT
POIKILOCYTOSIS BLD QL SMEAR: PRESENT
POLYCHROMASIA BLD QL SMEAR: PRESENT
POLYCHROMASIA BLD QL SMEAR: PRESENT
POTASSIUM SERPL-SCNC: 4.1 MMOL/L (ref 3.5–5.3)
POTASSIUM SERPL-SCNC: 4.2 MMOL/L (ref 3.5–5.3)
POTASSIUM SERPL-SCNC: 4.3 MMOL/L (ref 3.5–5.3)
POTASSIUM SERPL-SCNC: 4.3 MMOL/L (ref 3.5–5.3)
POTASSIUM SERPL-SCNC: 4.4 MMOL/L (ref 3.5–5.3)
POTASSIUM SERPL-SCNC: 4.5 MMOL/L (ref 3.5–5.3)
POTASSIUM SERPL-SCNC: 4.6 MMOL/L (ref 3.5–5.3)
POTASSIUM SERPL-SCNC: 4.6 MMOL/L (ref 3.5–5.3)
POTASSIUM SERPL-SCNC: 4.9 MMOL/L (ref 3.5–5.3)
POTASSIUM SERPL-SCNC: 5.3 MMOL/L (ref 3.5–5.3)
POTASSIUM SERPL-SCNC: 5.4 MMOL/L (ref 3.5–5.3)
POTASSIUM SERPL-SCNC: 6.2 MMOL/L (ref 3.5–5.3)
PR INTERVAL: 152 MS
PROCALCITONIN SERPL-MCNC: 2.01 NG/ML
PROCALCITONIN SERPL-MCNC: 3.19 NG/ML
PROT SERPL-MCNC: 4.9 G/DL (ref 6.4–8.4)
PROT SERPL-MCNC: 5 G/DL (ref 6.4–8.4)
PROT SERPL-MCNC: 5 G/DL (ref 6.4–8.4)
PROT SERPL-MCNC: 5.1 G/DL (ref 6.4–8.4)
PROT SERPL-MCNC: 5.2 G/DL (ref 6.4–8.4)
PROT SERPL-MCNC: 5.7 G/DL (ref 6.4–8.4)
PROT SERPL-MCNC: 5.7 G/DL (ref 6.4–8.4)
PROT SERPL-MCNC: 5.9 G/DL (ref 6.4–8.4)
PROT SERPL-MCNC: 5.9 G/DL (ref 6.4–8.4)
PROT UR STRIP-MCNC: NEGATIVE MG/DL
PROTHROMBIN TIME: 21.4 SECONDS (ref 12.3–15)
PROTHROMBIN TIME: 23.4 SECONDS (ref 12.3–15)
PROTHROMBIN TIME: 23.5 SECONDS (ref 12.3–15)
PROTHROMBIN TIME: 23.5 SECONDS (ref 12.3–15)
PROTHROMBIN TIME: 24.3 SECONDS (ref 12.3–15)
PROTHROMBIN TIME: 25.2 SECONDS (ref 12.3–15)
PROTHROMBIN TIME: 26 SECONDS (ref 12.3–15)
PROTHROMBIN TIME: 28.3 SECONDS (ref 12.3–15)
QRS AXIS: -2 DEGREES
QRS AXIS: 37 DEGREES
QRSD INTERVAL: 80 MS
QRSD INTERVAL: 90 MS
QT INTERVAL: 304 MS
QT INTERVAL: 308 MS
QTC INTERVAL: 424 MS
QTC INTERVAL: 435 MS
RA PRESSURE ESTIMATED: 8 MMHG
RBC # BLD AUTO: 2.79 MILLION/UL (ref 3.88–5.62)
RBC # BLD AUTO: 2.88 MILLION/UL (ref 3.88–5.62)
RBC # BLD AUTO: 2.92 MILLION/UL (ref 3.88–5.62)
RBC # BLD AUTO: 3.01 MILLION/UL (ref 3.88–5.62)
RBC # BLD AUTO: 3.07 MILLION/UL (ref 3.88–5.62)
RBC # BLD AUTO: 3.12 MILLION/UL (ref 3.88–5.62)
RBC # BLD AUTO: 3.14 MILLION/UL (ref 3.88–5.62)
RBC # BLD AUTO: 3.19 MILLION/UL (ref 3.88–5.62)
RBC # BLD AUTO: 3.45 MILLION/UL (ref 3.88–5.62)
RBC #/AREA URNS AUTO: ABNORMAL /HPF
RBC MORPH BLD: PRESENT
RBC MORPH BLD: PRESENT
RIGHT ATRIUM AREA SYSTOLE A4C: 15.9 CM2
RV PSP: 46 MMHG
SINOTUBULAR JUNCTION: 2.6 CM
SITE: NORMAL
SITE: NORMAL
SL CV LEFT ATRIUM LENGTH A2C: 5.2 CM
SL CV LV EF: 60
SL CV PED ECHO LEFT VENTRICLE DIASTOLIC VOLUME (MOD BIPLANE) 2D: 95 ML
SL CV PED ECHO LEFT VENTRICLE SYSTOLIC VOLUME (MOD BIPLANE) 2D: 21 ML
SL CV SINUS OF VALSALVA 2D: 4 CM
SODIUM SERPL-SCNC: 130 MMOL/L (ref 135–147)
SODIUM SERPL-SCNC: 132 MMOL/L (ref 135–147)
SODIUM SERPL-SCNC: 134 MMOL/L (ref 135–147)
SODIUM SERPL-SCNC: 134 MMOL/L (ref 135–147)
SODIUM SERPL-SCNC: 135 MMOL/L (ref 135–147)
SODIUM SERPL-SCNC: 137 MMOL/L (ref 135–147)
SODIUM SERPL-SCNC: 139 MMOL/L (ref 135–147)
SODIUM SERPL-SCNC: 140 MMOL/L (ref 135–147)
SODIUM SERPL-SCNC: 141 MMOL/L (ref 135–147)
SODIUM SERPL-SCNC: 141 MMOL/L (ref 135–147)
SODIUM SERPL-SCNC: 142 MMOL/L (ref 135–147)
SODIUM SERPL-SCNC: 145 MMOL/L (ref 135–147)
SP GR UR STRIP.AUTO: 1.02 (ref 1–1.03)
STJ: 2.6 CM
T WAVE AXIS: -21 DEGREES
T WAVE AXIS: -65 DEGREES
TARGETS BLD QL SMEAR: PRESENT
TARGETS BLD QL SMEAR: PRESENT
TOTAL CELLS COUNTED SPEC: 100
TOTAL CELLS COUNTED SPEC: 100
TOXIC GRANULES BLD QL SMEAR: PRESENT
TR MAX PG: 38 MMHG
TR PEAK VELOCITY: 3.1 M/S
TRICUSPID ANNULAR PLANE SYSTOLIC EXCURSION: 2 CM
TRICUSPID VALVE PEAK REGURGITATION VELOCITY: 3.09 M/S
UROBILINOGEN UR STRIP-ACNC: <2 MG/DL
VANCOMYCIN SERPL-MCNC: 14.5 UG/ML (ref 10–20)
VANCOMYCIN SERPL-MCNC: 15.9 UG/ML (ref 10–20)
VANCOMYCIN SERPL-MCNC: 24.3 UG/ML (ref 10–20)
VENTRICULAR RATE: 114 BPM
VENTRICULAR RATE: 123 BPM
WBC # BLD AUTO: 10.04 THOUSAND/UL (ref 4.31–10.16)
WBC # BLD AUTO: 10.33 THOUSAND/UL (ref 4.31–10.16)
WBC # BLD AUTO: 6.71 THOUSAND/UL (ref 4.31–10.16)
WBC # BLD AUTO: 7.64 THOUSAND/UL (ref 4.31–10.16)
WBC # BLD AUTO: 7.77 THOUSAND/UL (ref 4.31–10.16)
WBC # BLD AUTO: 7.85 THOUSAND/UL (ref 4.31–10.16)
WBC # BLD AUTO: 8.51 THOUSAND/UL (ref 4.31–10.16)
WBC # BLD AUTO: 8.74 THOUSAND/UL (ref 4.31–10.16)
WBC # BLD AUTO: 9.36 THOUSAND/UL (ref 4.31–10.16)
WBC # FLD MANUAL: 162 /UL
WBC # FLD MANUAL: 284 /UL
WBC #/AREA URNS AUTO: ABNORMAL /HPF

## 2024-01-01 PROCEDURE — 85027 COMPLETE CBC AUTOMATED: CPT

## 2024-01-01 PROCEDURE — 99223 1ST HOSP IP/OBS HIGH 75: CPT | Performed by: INTERNAL MEDICINE

## 2024-01-01 PROCEDURE — 87147 CULTURE TYPE IMMUNOLOGIC: CPT

## 2024-01-01 PROCEDURE — 87086 URINE CULTURE/COLONY COUNT: CPT

## 2024-01-01 PROCEDURE — 0T9B70Z DRAINAGE OF BLADDER WITH DRAINAGE DEVICE, VIA NATURAL OR ARTIFICIAL OPENING: ICD-10-PCS | Performed by: UROLOGY

## 2024-01-01 PROCEDURE — 82330 ASSAY OF CALCIUM: CPT

## 2024-01-01 PROCEDURE — 49082 ABD PARACENTESIS: CPT | Performed by: EMERGENCY MEDICINE

## 2024-01-01 PROCEDURE — 80053 COMPREHEN METABOLIC PANEL: CPT

## 2024-01-01 PROCEDURE — 80048 BASIC METABOLIC PNL TOTAL CA: CPT | Performed by: INTERNAL MEDICINE

## 2024-01-01 PROCEDURE — 82948 REAGENT STRIP/BLOOD GLUCOSE: CPT

## 2024-01-01 PROCEDURE — 93306 TTE W/DOPPLER COMPLETE: CPT | Performed by: INTERNAL MEDICINE

## 2024-01-01 PROCEDURE — 84100 ASSAY OF PHOSPHORUS: CPT | Performed by: NURSE PRACTITIONER

## 2024-01-01 PROCEDURE — 0W9G3ZZ DRAINAGE OF PERITONEAL CAVITY, PERCUTANEOUS APPROACH: ICD-10-PCS | Performed by: INTERNAL MEDICINE

## 2024-01-01 PROCEDURE — 82330 ASSAY OF CALCIUM: CPT | Performed by: NURSE PRACTITIONER

## 2024-01-01 PROCEDURE — 80048 BASIC METABOLIC PNL TOTAL CA: CPT | Performed by: PHYSICIAN ASSISTANT

## 2024-01-01 PROCEDURE — 85610 PROTHROMBIN TIME: CPT | Performed by: NURSE PRACTITIONER

## 2024-01-01 PROCEDURE — 87070 CULTURE OTHR SPECIMN AEROBIC: CPT

## 2024-01-01 PROCEDURE — 96375 TX/PRO/DX INJ NEW DRUG ADDON: CPT

## 2024-01-01 PROCEDURE — 85025 COMPLETE CBC W/AUTO DIFF WBC: CPT

## 2024-01-01 PROCEDURE — 80076 HEPATIC FUNCTION PANEL: CPT

## 2024-01-01 PROCEDURE — 84100 ASSAY OF PHOSPHORUS: CPT

## 2024-01-01 PROCEDURE — 99291 CRITICAL CARE FIRST HOUR: CPT | Performed by: EMERGENCY MEDICINE

## 2024-01-01 PROCEDURE — 88112 CYTOPATH CELL ENHANCE TECH: CPT | Performed by: PATHOLOGY

## 2024-01-01 PROCEDURE — 99233 SBSQ HOSP IP/OBS HIGH 50: CPT | Performed by: INTERNAL MEDICINE

## 2024-01-01 PROCEDURE — 99238 HOSP IP/OBS DSCHRG MGMT 30/<: CPT | Performed by: INTERNAL MEDICINE

## 2024-01-01 PROCEDURE — 82140 ASSAY OF AMMONIA: CPT

## 2024-01-01 PROCEDURE — 71045 X-RAY EXAM CHEST 1 VIEW: CPT

## 2024-01-01 PROCEDURE — 87081 CULTURE SCREEN ONLY: CPT

## 2024-01-01 PROCEDURE — 93306 TTE W/DOPPLER COMPLETE: CPT

## 2024-01-01 PROCEDURE — 80053 COMPREHEN METABOLIC PANEL: CPT | Performed by: PHYSICIAN ASSISTANT

## 2024-01-01 PROCEDURE — NC001 PR NO CHARGE: Performed by: INTERNAL MEDICINE

## 2024-01-01 PROCEDURE — 83735 ASSAY OF MAGNESIUM: CPT | Performed by: INTERNAL MEDICINE

## 2024-01-01 PROCEDURE — 83735 ASSAY OF MAGNESIUM: CPT | Performed by: PHYSICIAN ASSISTANT

## 2024-01-01 PROCEDURE — 80053 COMPREHEN METABOLIC PANEL: CPT | Performed by: NURSE PRACTITIONER

## 2024-01-01 PROCEDURE — 88305 TISSUE EXAM BY PATHOLOGIST: CPT | Performed by: PATHOLOGY

## 2024-01-01 PROCEDURE — 89051 BODY FLUID CELL COUNT: CPT

## 2024-01-01 PROCEDURE — 96368 THER/DIAG CONCURRENT INF: CPT

## 2024-01-01 PROCEDURE — 99291 CRITICAL CARE FIRST HOUR: CPT | Performed by: INTERNAL MEDICINE

## 2024-01-01 PROCEDURE — 85610 PROTHROMBIN TIME: CPT | Performed by: PHYSICIAN ASSISTANT

## 2024-01-01 PROCEDURE — 0W9G3ZZ DRAINAGE OF PERITONEAL CAVITY, PERCUTANEOUS APPROACH: ICD-10-PCS

## 2024-01-01 PROCEDURE — 83735 ASSAY OF MAGNESIUM: CPT | Performed by: NURSE PRACTITIONER

## 2024-01-01 PROCEDURE — 87077 CULTURE AEROBIC IDENTIFY: CPT

## 2024-01-01 PROCEDURE — 83605 ASSAY OF LACTIC ACID: CPT

## 2024-01-01 PROCEDURE — 82140 ASSAY OF AMMONIA: CPT | Performed by: NURSE PRACTITIONER

## 2024-01-01 PROCEDURE — 85610 PROTHROMBIN TIME: CPT

## 2024-01-01 PROCEDURE — 85730 THROMBOPLASTIN TIME PARTIAL: CPT

## 2024-01-01 PROCEDURE — 83735 ASSAY OF MAGNESIUM: CPT

## 2024-01-01 PROCEDURE — 87205 SMEAR GRAM STAIN: CPT

## 2024-01-01 PROCEDURE — 93005 ELECTROCARDIOGRAM TRACING: CPT

## 2024-01-01 PROCEDURE — 99232 SBSQ HOSP IP/OBS MODERATE 35: CPT | Performed by: INTERNAL MEDICINE

## 2024-01-01 PROCEDURE — 80202 ASSAY OF VANCOMYCIN: CPT | Performed by: NURSE PRACTITIONER

## 2024-01-01 PROCEDURE — 85007 BL SMEAR W/DIFF WBC COUNT: CPT | Performed by: NURSE PRACTITIONER

## 2024-01-01 PROCEDURE — 96367 TX/PROPH/DG ADDL SEQ IV INF: CPT

## 2024-01-01 PROCEDURE — 96376 TX/PRO/DX INJ SAME DRUG ADON: CPT

## 2024-01-01 PROCEDURE — 85025 COMPLETE CBC W/AUTO DIFF WBC: CPT | Performed by: INTERNAL MEDICINE

## 2024-01-01 PROCEDURE — 89051 BODY FLUID CELL COUNT: CPT | Performed by: INTERNAL MEDICINE

## 2024-01-01 PROCEDURE — 99223 1ST HOSP IP/OBS HIGH 75: CPT | Performed by: STUDENT IN AN ORGANIZED HEALTH CARE EDUCATION/TRAINING PROGRAM

## 2024-01-01 PROCEDURE — 99285 EMERGENCY DEPT VISIT HI MDM: CPT

## 2024-01-01 PROCEDURE — 93010 ELECTROCARDIOGRAM REPORT: CPT | Performed by: INTERNAL MEDICINE

## 2024-01-01 PROCEDURE — 70450 CT HEAD/BRAIN W/O DYE: CPT

## 2024-01-01 PROCEDURE — 87040 BLOOD CULTURE FOR BACTERIA: CPT

## 2024-01-01 PROCEDURE — 36415 COLL VENOUS BLD VENIPUNCTURE: CPT

## 2024-01-01 PROCEDURE — 84100 ASSAY OF PHOSPHORUS: CPT | Performed by: PHYSICIAN ASSISTANT

## 2024-01-01 PROCEDURE — 84145 PROCALCITONIN (PCT): CPT | Performed by: PHYSICIAN ASSISTANT

## 2024-01-01 PROCEDURE — 82330 ASSAY OF CALCIUM: CPT | Performed by: INTERNAL MEDICINE

## 2024-01-01 PROCEDURE — 49083 ABD PARACENTESIS W/IMAGING: CPT | Performed by: INTERNAL MEDICINE

## 2024-01-01 PROCEDURE — 85025 COMPLETE CBC W/AUTO DIFF WBC: CPT | Performed by: PHYSICIAN ASSISTANT

## 2024-01-01 PROCEDURE — 87186 SC STD MICRODIL/AGAR DIL: CPT

## 2024-01-01 PROCEDURE — 80048 BASIC METABOLIC PNL TOTAL CA: CPT

## 2024-01-01 PROCEDURE — 83880 ASSAY OF NATRIURETIC PEPTIDE: CPT

## 2024-01-01 PROCEDURE — 84100 ASSAY OF PHOSPHORUS: CPT | Performed by: INTERNAL MEDICINE

## 2024-01-01 PROCEDURE — 87205 SMEAR GRAM STAIN: CPT | Performed by: INTERNAL MEDICINE

## 2024-01-01 PROCEDURE — 76882 US LMTD JT/FCL EVL NVASC XTR: CPT

## 2024-01-01 PROCEDURE — 80076 HEPATIC FUNCTION PANEL: CPT | Performed by: PHYSICIAN ASSISTANT

## 2024-01-01 PROCEDURE — 81001 URINALYSIS AUTO W/SCOPE: CPT

## 2024-01-01 PROCEDURE — 80202 ASSAY OF VANCOMYCIN: CPT | Performed by: PHYSICIAN ASSISTANT

## 2024-01-01 PROCEDURE — 99233 SBSQ HOSP IP/OBS HIGH 50: CPT | Performed by: STUDENT IN AN ORGANIZED HEALTH CARE EDUCATION/TRAINING PROGRAM

## 2024-01-01 PROCEDURE — 82805 BLOOD GASES W/O2 SATURATION: CPT | Performed by: NURSE PRACTITIONER

## 2024-01-01 PROCEDURE — 85027 COMPLETE CBC AUTOMATED: CPT | Performed by: NURSE PRACTITIONER

## 2024-01-01 PROCEDURE — 96365 THER/PROPH/DIAG IV INF INIT: CPT

## 2024-01-01 PROCEDURE — 82330 ASSAY OF CALCIUM: CPT | Performed by: PHYSICIAN ASSISTANT

## 2024-01-01 PROCEDURE — 84145 PROCALCITONIN (PCT): CPT

## 2024-01-01 PROCEDURE — 82805 BLOOD GASES W/O2 SATURATION: CPT

## 2024-01-01 PROCEDURE — 83605 ASSAY OF LACTIC ACID: CPT | Performed by: NURSE PRACTITIONER

## 2024-01-01 PROCEDURE — 80202 ASSAY OF VANCOMYCIN: CPT | Performed by: INTERNAL MEDICINE

## 2024-01-01 PROCEDURE — 96366 THER/PROPH/DIAG IV INF ADDON: CPT

## 2024-01-01 PROCEDURE — 51702 INSERT TEMP BLADDER CATH: CPT | Performed by: NURSE PRACTITIONER

## 2024-01-01 PROCEDURE — 87070 CULTURE OTHR SPECIMN AEROBIC: CPT | Performed by: INTERNAL MEDICINE

## 2024-01-01 RX ORDER — INSULIN GLARGINE 100 [IU]/ML
40 INJECTION, SOLUTION SUBCUTANEOUS EVERY MORNING
Status: DISCONTINUED | OUTPATIENT
Start: 2024-01-01 | End: 2024-01-01

## 2024-01-01 RX ORDER — ALBUMIN (HUMAN) 12.5 G/50ML
25 SOLUTION INTRAVENOUS EVERY 8 HOURS
Status: DISCONTINUED | OUTPATIENT
Start: 2024-01-01 | End: 2024-01-01 | Stop reason: HOSPADM

## 2024-01-01 RX ORDER — BUMETANIDE 0.25 MG/ML
1 INJECTION, SOLUTION INTRAMUSCULAR; INTRAVENOUS 2 TIMES DAILY
Status: DISCONTINUED | OUTPATIENT
Start: 2024-01-01 | End: 2024-01-01

## 2024-01-01 RX ORDER — INSULIN LISPRO 100 [IU]/ML
1-6 INJECTION, SOLUTION INTRAVENOUS; SUBCUTANEOUS EVERY 6 HOURS
Status: DISCONTINUED | OUTPATIENT
Start: 2024-01-01 | End: 2024-01-01

## 2024-01-01 RX ORDER — DEXAMETHASONE SODIUM PHOSPHATE 4 MG/ML
2 INJECTION, SOLUTION INTRA-ARTICULAR; INTRALESIONAL; INTRAMUSCULAR; INTRAVENOUS; SOFT TISSUE 2 TIMES DAILY
Status: DISCONTINUED | OUTPATIENT
Start: 2024-01-01 | End: 2024-01-01 | Stop reason: HOSPADM

## 2024-01-01 RX ORDER — HEPARIN SODIUM 5000 [USP'U]/ML
5000 INJECTION, SOLUTION INTRAVENOUS; SUBCUTANEOUS EVERY 8 HOURS SCHEDULED
Status: DISCONTINUED | OUTPATIENT
Start: 2024-01-01 | End: 2024-01-01

## 2024-01-01 RX ORDER — HYDROMORPHONE HCL/PF 1 MG/ML
0.5 SYRINGE (ML) INJECTION EVERY 6 HOURS PRN
Status: DISCONTINUED | OUTPATIENT
Start: 2024-01-01 | End: 2024-01-01

## 2024-01-01 RX ORDER — FENTANYL CITRATE 50 UG/ML
50 INJECTION, SOLUTION INTRAMUSCULAR; INTRAVENOUS ONCE
Refills: 0 | Status: COMPLETED | OUTPATIENT
Start: 2024-01-01 | End: 2024-01-01

## 2024-01-01 RX ORDER — INSULIN LISPRO 100 [IU]/ML
2-12 INJECTION, SOLUTION INTRAVENOUS; SUBCUTANEOUS
Status: DISCONTINUED | OUTPATIENT
Start: 2024-01-01 | End: 2024-01-01

## 2024-01-01 RX ORDER — INSULIN LISPRO 100 [IU]/ML
1-5 INJECTION, SOLUTION INTRAVENOUS; SUBCUTANEOUS
Status: DISCONTINUED | OUTPATIENT
Start: 2024-01-01 | End: 2024-01-01

## 2024-01-01 RX ORDER — METOPROLOL TARTRATE 1 MG/ML
5 INJECTION, SOLUTION INTRAVENOUS ONCE
Status: COMPLETED | OUTPATIENT
Start: 2024-01-01 | End: 2024-01-01

## 2024-01-01 RX ORDER — INSULIN LISPRO 100 [IU]/ML
2-12 INJECTION, SOLUTION INTRAVENOUS; SUBCUTANEOUS EVERY 6 HOURS SCHEDULED
Status: DISCONTINUED | OUTPATIENT
Start: 2024-01-01 | End: 2024-01-01

## 2024-01-01 RX ORDER — MIDODRINE HYDROCHLORIDE 5 MG/1
5 TABLET ORAL
Status: DISCONTINUED | OUTPATIENT
Start: 2024-01-01 | End: 2024-01-01

## 2024-01-01 RX ORDER — DEXTROSE MONOHYDRATE 25 G/50ML
25 INJECTION, SOLUTION INTRAVENOUS ONCE
Status: COMPLETED | OUTPATIENT
Start: 2024-01-01 | End: 2024-01-01

## 2024-01-01 RX ORDER — CHLORHEXIDINE GLUCONATE ORAL RINSE 1.2 MG/ML
15 SOLUTION DENTAL EVERY 12 HOURS SCHEDULED
Status: DISCONTINUED | OUTPATIENT
Start: 2024-01-01 | End: 2024-01-01

## 2024-01-01 RX ORDER — HYDROMORPHONE HCL IN WATER/PF 6 MG/30 ML
0.2 PATIENT CONTROLLED ANALGESIA SYRINGE INTRAVENOUS EVERY 6 HOURS PRN
Status: DISCONTINUED | OUTPATIENT
Start: 2024-01-01 | End: 2024-01-01

## 2024-01-01 RX ORDER — LACTULOSE 10 G/15ML
20 SOLUTION ORAL DAILY
Status: DISCONTINUED | OUTPATIENT
Start: 2024-01-01 | End: 2024-01-01

## 2024-01-01 RX ORDER — SODIUM CHLORIDE, SODIUM GLUCONATE, SODIUM ACETATE, POTASSIUM CHLORIDE, MAGNESIUM CHLORIDE, SODIUM PHOSPHATE, DIBASIC, AND POTASSIUM PHOSPHATE .53; .5; .37; .037; .03; .012; .00082 G/100ML; G/100ML; G/100ML; G/100ML; G/100ML; G/100ML; G/100ML
1000 INJECTION, SOLUTION INTRAVENOUS ONCE
Status: COMPLETED | OUTPATIENT
Start: 2024-01-01 | End: 2024-01-01

## 2024-01-01 RX ORDER — LORAZEPAM 2 MG/ML
0.5 INJECTION INTRAMUSCULAR EVERY 6 HOURS PRN
Status: SHIPPED | OUTPATIENT
Start: 2024-01-01 | End: 2024-11-28

## 2024-01-01 RX ORDER — ALBUMIN HUMAN 50 G/1000ML
12.5 SOLUTION INTRAVENOUS EVERY 6 HOURS
Status: COMPLETED | OUTPATIENT
Start: 2024-01-01 | End: 2024-01-01

## 2024-01-01 RX ORDER — MAGNESIUM SULFATE HEPTAHYDRATE 40 MG/ML
2 INJECTION, SOLUTION INTRAVENOUS ONCE
Status: COMPLETED | OUTPATIENT
Start: 2024-01-01 | End: 2024-01-01

## 2024-01-01 RX ORDER — LACTULOSE 10 G/15ML
20 SOLUTION ORAL 2 TIMES DAILY
Status: DISCONTINUED | OUTPATIENT
Start: 2024-01-01 | End: 2024-01-01

## 2024-01-01 RX ORDER — HYDROMORPHONE HCL IN WATER/PF 6 MG/30 ML
0.2 PATIENT CONTROLLED ANALGESIA SYRINGE INTRAVENOUS ONCE
Status: COMPLETED | OUTPATIENT
Start: 2024-01-01 | End: 2024-01-01

## 2024-01-01 RX ORDER — MIDODRINE HYDROCHLORIDE 5 MG/1
10 TABLET ORAL
Status: DISCONTINUED | OUTPATIENT
Start: 2024-01-01 | End: 2024-01-01

## 2024-01-01 RX ORDER — DEXAMETHASONE SODIUM PHOSPHATE 4 MG/ML
2 INJECTION, SOLUTION INTRA-ARTICULAR; INTRALESIONAL; INTRAMUSCULAR; INTRAVENOUS; SOFT TISSUE 2 TIMES DAILY
Qty: 60 ML | Refills: 0
Start: 2024-01-01 | End: 2024-11-28

## 2024-01-01 RX ORDER — LORAZEPAM 2 MG/ML
0.5 INJECTION INTRAMUSCULAR ONCE
Status: COMPLETED | OUTPATIENT
Start: 2024-01-01 | End: 2024-01-01

## 2024-01-01 RX ORDER — LACTULOSE 10 G/15ML
20 SOLUTION ORAL 3 TIMES DAILY
Status: DISCONTINUED | OUTPATIENT
Start: 2024-01-01 | End: 2024-01-01

## 2024-01-01 RX ORDER — ALBUMIN HUMAN 50 G/1000ML
25 SOLUTION INTRAVENOUS ONCE
Status: DISCONTINUED | OUTPATIENT
Start: 2024-01-01 | End: 2024-01-01

## 2024-01-01 RX ORDER — INSULIN GLARGINE 100 [IU]/ML
10 INJECTION, SOLUTION SUBCUTANEOUS EVERY MORNING
Status: DISCONTINUED | OUTPATIENT
Start: 2024-01-01 | End: 2024-01-01

## 2024-01-01 RX ORDER — ALBUMIN HUMAN 50 G/1000ML
25 SOLUTION INTRAVENOUS ONCE
Status: COMPLETED | OUTPATIENT
Start: 2024-01-01 | End: 2024-01-01

## 2024-01-01 RX ORDER — METOPROLOL TARTRATE 1 MG/ML
5 INJECTION, SOLUTION INTRAVENOUS EVERY 6 HOURS
Status: DISCONTINUED | OUTPATIENT
Start: 2024-01-01 | End: 2024-01-01 | Stop reason: HOSPADM

## 2024-01-01 RX ORDER — LORAZEPAM 2 MG/ML
0.5 INJECTION INTRAMUSCULAR EVERY 6 HOURS PRN
Status: DISCONTINUED | OUTPATIENT
Start: 2024-01-01 | End: 2024-01-01 | Stop reason: HOSPADM

## 2024-01-01 RX ORDER — HYDROMORPHONE HCL IN WATER/PF 6 MG/30 ML
0.2 PATIENT CONTROLLED ANALGESIA SYRINGE INTRAVENOUS EVERY 4 HOURS PRN
Status: SHIPPED | OUTPATIENT
Start: 2024-01-01 | End: 2024-11-28

## 2024-01-01 RX ORDER — DEXAMETHASONE SODIUM PHOSPHATE 4 MG/ML
4 INJECTION, SOLUTION INTRA-ARTICULAR; INTRALESIONAL; INTRAMUSCULAR; INTRAVENOUS; SOFT TISSUE ONCE
Status: COMPLETED | OUTPATIENT
Start: 2024-01-01 | End: 2024-01-01

## 2024-01-01 RX ORDER — OXYCODONE HYDROCHLORIDE 5 MG/1
5 TABLET ORAL EVERY 6 HOURS PRN
Status: SHIPPED | OUTPATIENT
Start: 2024-01-01 | End: 2024-11-28

## 2024-01-01 RX ORDER — DEXAMETHASONE 4 MG/1
2 TABLET ORAL 2 TIMES DAILY WITH MEALS
Status: CANCELLED
Start: 2024-01-01

## 2024-01-01 RX ORDER — INSULIN LISPRO 100 [IU]/ML
1-6 INJECTION, SOLUTION INTRAVENOUS; SUBCUTANEOUS
Status: DISCONTINUED | OUTPATIENT
Start: 2024-01-01 | End: 2024-01-01

## 2024-01-01 RX ORDER — INSULIN GLARGINE 100 [IU]/ML
10 INJECTION, SOLUTION SUBCUTANEOUS ONCE
Status: COMPLETED | OUTPATIENT
Start: 2024-01-01 | End: 2024-01-01

## 2024-01-01 RX ORDER — OXYCODONE HYDROCHLORIDE 5 MG/1
5 TABLET ORAL EVERY 6 HOURS PRN
Refills: 0 | Status: DISCONTINUED | OUTPATIENT
Start: 2024-01-01 | End: 2024-01-01 | Stop reason: HOSPADM

## 2024-01-01 RX ORDER — ALBUMIN (HUMAN) 12.5 G/50ML
12.5 SOLUTION INTRAVENOUS EVERY 6 HOURS
Status: DISCONTINUED | OUTPATIENT
Start: 2024-01-01 | End: 2024-01-01

## 2024-01-01 RX ORDER — HYDROMORPHONE HCL/PF 1 MG/ML
0.5 SYRINGE (ML) INJECTION ONCE
Status: COMPLETED | OUTPATIENT
Start: 2024-01-01 | End: 2024-01-01

## 2024-01-01 RX ORDER — MICONAZOLE NITRATE 20 MG/G
CREAM TOPICAL 2 TIMES DAILY
Status: DISCONTINUED | OUTPATIENT
Start: 2024-01-01 | End: 2024-01-01 | Stop reason: HOSPADM

## 2024-01-01 RX ORDER — METOPROLOL TARTRATE 1 MG/ML
2.5 INJECTION, SOLUTION INTRAVENOUS
Status: COMPLETED | OUTPATIENT
Start: 2024-01-01 | End: 2024-01-01

## 2024-01-01 RX ORDER — TRAMADOL HYDROCHLORIDE 50 MG/1
50 TABLET ORAL ONCE
Status: COMPLETED | OUTPATIENT
Start: 2024-01-01 | End: 2024-01-01

## 2024-01-01 RX ORDER — INSULIN GLARGINE 100 [IU]/ML
20 INJECTION, SOLUTION SUBCUTANEOUS EVERY MORNING
Status: DISCONTINUED | OUTPATIENT
Start: 2024-01-01 | End: 2024-01-01

## 2024-01-01 RX ORDER — MAGNESIUM SULFATE 1 G/100ML
1 INJECTION INTRAVENOUS ONCE
Status: COMPLETED | OUTPATIENT
Start: 2024-01-01 | End: 2024-01-01

## 2024-01-01 RX ORDER — LORAZEPAM 0.5 MG/1
0.5 TABLET ORAL EVERY 6 HOURS PRN
Status: DISCONTINUED | OUTPATIENT
Start: 2024-01-01 | End: 2024-01-01

## 2024-01-01 RX ORDER — MIDODRINE HYDROCHLORIDE 5 MG/1
10 TABLET ORAL EVERY 8 HOURS
Status: DISCONTINUED | OUTPATIENT
Start: 2024-01-01 | End: 2024-01-01

## 2024-01-01 RX ORDER — HYDROMORPHONE HCL IN WATER/PF 6 MG/30 ML
0.2 PATIENT CONTROLLED ANALGESIA SYRINGE INTRAVENOUS EVERY 4 HOURS PRN
Status: DISCONTINUED | OUTPATIENT
Start: 2024-01-01 | End: 2024-01-01 | Stop reason: HOSPADM

## 2024-01-01 RX ORDER — ALBUMIN (HUMAN) 12.5 G/50ML
50 SOLUTION INTRAVENOUS ONCE
Status: CANCELLED | OUTPATIENT
Start: 2024-01-01 | End: 2024-01-01

## 2024-01-01 RX ADMIN — MICONAZOLE NITRATE: 20 CREAM TOPICAL at 17:21

## 2024-01-01 RX ADMIN — INSULIN LISPRO 1 UNITS: 100 INJECTION, SOLUTION INTRAVENOUS; SUBCUTANEOUS at 08:03

## 2024-01-01 RX ADMIN — MICONAZOLE NITRATE: 20 CREAM TOPICAL at 10:09

## 2024-01-01 RX ADMIN — HEPARIN SODIUM 5000 UNITS: 5000 INJECTION INTRAVENOUS; SUBCUTANEOUS at 05:47

## 2024-01-01 RX ADMIN — Medication 200 G: at 23:41

## 2024-01-01 RX ADMIN — ALBUMIN (HUMAN) 12.5 G: 0.25 INJECTION, SOLUTION INTRAVENOUS at 21:35

## 2024-01-01 RX ADMIN — CEFEPIME 2000 MG: 2 INJECTION, POWDER, FOR SOLUTION INTRAVENOUS at 12:37

## 2024-01-01 RX ADMIN — HEPARIN SODIUM 5000 UNITS: 5000 INJECTION INTRAVENOUS; SUBCUTANEOUS at 13:21

## 2024-01-01 RX ADMIN — MIDODRINE HYDROCHLORIDE 5 MG: 5 TABLET ORAL at 15:17

## 2024-01-01 RX ADMIN — CHLORHEXIDINE GLUCONATE 15 ML: 1.2 RINSE ORAL at 21:38

## 2024-01-01 RX ADMIN — INSULIN GLARGINE 40 UNITS: 100 INJECTION, SOLUTION SUBCUTANEOUS at 08:07

## 2024-01-01 RX ADMIN — CHLORHEXIDINE GLUCONATE 15 ML: 1.2 RINSE ORAL at 20:07

## 2024-01-01 RX ADMIN — HYDROMORPHONE HYDROCHLORIDE 0.2 MG: 0.2 INJECTION, SOLUTION INTRAMUSCULAR; INTRAVENOUS; SUBCUTANEOUS at 16:19

## 2024-01-01 RX ADMIN — CHLORHEXIDINE GLUCONATE 15 ML: 1.2 RINSE ORAL at 09:25

## 2024-01-01 RX ADMIN — MICONAZOLE NITRATE: 20 CREAM TOPICAL at 13:23

## 2024-01-01 RX ADMIN — SODIUM CHLORIDE 6 UNITS/HR: 9 INJECTION, SOLUTION INTRAVENOUS at 07:26

## 2024-01-01 RX ADMIN — HEPARIN SODIUM 5000 UNITS: 5000 INJECTION INTRAVENOUS; SUBCUTANEOUS at 21:29

## 2024-01-01 RX ADMIN — DEXAMETHASONE SODIUM PHOSPHATE 2 MG: 4 INJECTION INTRA-ARTICULAR; INTRALESIONAL; INTRAMUSCULAR; INTRAVENOUS; SOFT TISSUE at 05:47

## 2024-01-01 RX ADMIN — TRAMADOL HYDROCHLORIDE 50 MG: 50 TABLET, COATED ORAL at 10:42

## 2024-01-01 RX ADMIN — MICONAZOLE NITRATE: 20 CREAM TOPICAL at 17:33

## 2024-01-01 RX ADMIN — CHLORHEXIDINE GLUCONATE 15 ML: 1.2 RINSE ORAL at 21:44

## 2024-01-01 RX ADMIN — CEFEPIME 2000 MG: 2 INJECTION, POWDER, FOR SOLUTION INTRAVENOUS at 11:48

## 2024-01-01 RX ADMIN — INSULIN LISPRO 3 UNITS: 100 INJECTION, SOLUTION INTRAVENOUS; SUBCUTANEOUS at 11:48

## 2024-01-01 RX ADMIN — CEFEPIME 2000 MG: 2 INJECTION, POWDER, FOR SOLUTION INTRAVENOUS at 03:28

## 2024-01-01 RX ADMIN — INSULIN LISPRO 1 UNITS: 100 INJECTION, SOLUTION INTRAVENOUS; SUBCUTANEOUS at 17:06

## 2024-01-01 RX ADMIN — BUMETANIDE 1 MG: 0.25 INJECTION INTRAMUSCULAR; INTRAVENOUS at 17:21

## 2024-01-01 RX ADMIN — DEXAMETHASONE SODIUM PHOSPHATE 2 MG: 4 INJECTION INTRA-ARTICULAR; INTRALESIONAL; INTRAMUSCULAR; INTRAVENOUS; SOFT TISSUE at 12:17

## 2024-01-01 RX ADMIN — MIDODRINE HYDROCHLORIDE 5 MG: 5 TABLET ORAL at 10:41

## 2024-01-01 RX ADMIN — NOREPINEPHRINE BITARTRATE 5 MCG/MIN: 1 INJECTION INTRAVENOUS at 14:29

## 2024-01-01 RX ADMIN — MICONAZOLE NITRATE: 20 CREAM TOPICAL at 08:32

## 2024-01-01 RX ADMIN — SODIUM CHLORIDE 1000 ML: 0.9 INJECTION, SOLUTION INTRAVENOUS at 09:16

## 2024-01-01 RX ADMIN — LACTULOSE 20 G: 20 SOLUTION ORAL at 17:08

## 2024-01-01 RX ADMIN — HEPARIN SODIUM 5000 UNITS: 5000 INJECTION INTRAVENOUS; SUBCUTANEOUS at 14:08

## 2024-01-01 RX ADMIN — FENTANYL CITRATE 50 MCG: 50 INJECTION INTRAMUSCULAR; INTRAVENOUS at 08:13

## 2024-01-01 RX ADMIN — INSULIN GLARGINE 20 UNITS: 100 INJECTION, SOLUTION SUBCUTANEOUS at 08:39

## 2024-01-01 RX ADMIN — LORAZEPAM 0.5 MG: 2 INJECTION INTRAMUSCULAR; INTRAVENOUS at 18:30

## 2024-01-01 RX ADMIN — INSULIN LISPRO 6 UNITS: 100 INJECTION, SOLUTION INTRAVENOUS; SUBCUTANEOUS at 05:48

## 2024-01-01 RX ADMIN — HYDROMORPHONE HYDROCHLORIDE 0.5 MG: 1 INJECTION, SOLUTION INTRAMUSCULAR; INTRAVENOUS; SUBCUTANEOUS at 15:25

## 2024-01-01 RX ADMIN — CEFEPIME 1000 MG: 1 INJECTION, POWDER, FOR SOLUTION INTRAMUSCULAR; INTRAVENOUS at 15:33

## 2024-01-01 RX ADMIN — MIDODRINE HYDROCHLORIDE 5 MG: 5 TABLET ORAL at 06:01

## 2024-01-01 RX ADMIN — DEXAMETHASONE SODIUM PHOSPHATE 4 MG: 4 INJECTION INTRA-ARTICULAR; INTRALESIONAL; INTRAMUSCULAR; INTRAVENOUS; SOFT TISSUE at 11:44

## 2024-01-01 RX ADMIN — INSULIN LISPRO 2 UNITS: 100 INJECTION, SOLUTION INTRAVENOUS; SUBCUTANEOUS at 11:48

## 2024-01-01 RX ADMIN — ALBUMIN (HUMAN) 12.5 G: 0.25 INJECTION, SOLUTION INTRAVENOUS at 16:23

## 2024-01-01 RX ADMIN — INSULIN LISPRO 5 UNITS: 100 INJECTION, SOLUTION INTRAVENOUS; SUBCUTANEOUS at 21:13

## 2024-01-01 RX ADMIN — CHLORHEXIDINE GLUCONATE 15 ML: 1.2 RINSE ORAL at 21:02

## 2024-01-01 RX ADMIN — OXYCODONE HYDROCHLORIDE 5 MG: 5 TABLET ORAL at 12:38

## 2024-01-01 RX ADMIN — RIFAXIMIN 550 MG: 550 TABLET ORAL at 20:07

## 2024-01-01 RX ADMIN — NOREPINEPHRINE BITARTRATE 12 MCG/MIN: 1 INJECTION INTRAVENOUS at 09:25

## 2024-01-01 RX ADMIN — CHLORHEXIDINE GLUCONATE 15 ML: 1.2 RINSE ORAL at 20:34

## 2024-01-01 RX ADMIN — ALBUMIN (HUMAN) 12.5 G: 12.5 INJECTION, SOLUTION INTRAVENOUS at 03:29

## 2024-01-01 RX ADMIN — HYDROMORPHONE HYDROCHLORIDE 0.2 MG: 0.2 INJECTION, SOLUTION INTRAMUSCULAR; INTRAVENOUS; SUBCUTANEOUS at 07:29

## 2024-01-01 RX ADMIN — CEFEPIME 1000 MG: 1 INJECTION, POWDER, FOR SOLUTION INTRAMUSCULAR; INTRAVENOUS at 03:28

## 2024-01-01 RX ADMIN — HYDROMORPHONE HYDROCHLORIDE 0.2 MG: 0.2 INJECTION, SOLUTION INTRAMUSCULAR; INTRAVENOUS; SUBCUTANEOUS at 10:56

## 2024-01-01 RX ADMIN — OXYCODONE HYDROCHLORIDE 5 MG: 5 TABLET ORAL at 16:55

## 2024-01-01 RX ADMIN — METOROPROLOL TARTRATE 5 MG: 5 INJECTION, SOLUTION INTRAVENOUS at 21:29

## 2024-01-01 RX ADMIN — ALBUMIN (HUMAN) 12.5 G: 0.25 INJECTION, SOLUTION INTRAVENOUS at 15:33

## 2024-01-01 RX ADMIN — HEPARIN SODIUM 5000 UNITS: 5000 INJECTION INTRAVENOUS; SUBCUTANEOUS at 21:12

## 2024-01-01 RX ADMIN — CEFEPIME 2000 MG: 2 INJECTION, POWDER, FOR SOLUTION INTRAVENOUS at 04:31

## 2024-01-01 RX ADMIN — NOREPINEPHRINE BITARTRATE 6 MCG/MIN: 1 INJECTION INTRAVENOUS at 23:10

## 2024-01-01 RX ADMIN — BUMETANIDE 1 MG: 0.25 INJECTION INTRAMUSCULAR; INTRAVENOUS at 18:40

## 2024-01-01 RX ADMIN — BUMETANIDE 1 MG: 0.25 INJECTION INTRAMUSCULAR; INTRAVENOUS at 08:30

## 2024-01-01 RX ADMIN — NOREPINEPHRINE BITARTRATE 5 MCG/MIN: 1 INJECTION INTRAVENOUS at 09:49

## 2024-01-01 RX ADMIN — NOREPINEPHRINE BITARTRATE 10 MCG/MIN: 1 INJECTION INTRAVENOUS at 14:36

## 2024-01-01 RX ADMIN — ALBUMIN (HUMAN) 12.5 G: 12.5 INJECTION, SOLUTION INTRAVENOUS at 21:24

## 2024-01-01 RX ADMIN — CHLORHEXIDINE GLUCONATE 15 ML: 1.2 RINSE ORAL at 08:37

## 2024-01-01 RX ADMIN — INSULIN GLARGINE 10 UNITS: 100 INJECTION, SOLUTION SUBCUTANEOUS at 21:35

## 2024-01-01 RX ADMIN — HEPARIN SODIUM 5000 UNITS: 5000 INJECTION INTRAVENOUS; SUBCUTANEOUS at 21:35

## 2024-01-01 RX ADMIN — HYDROMORPHONE HYDROCHLORIDE 0.2 MG: 0.2 INJECTION, SOLUTION INTRAMUSCULAR; INTRAVENOUS; SUBCUTANEOUS at 12:47

## 2024-01-01 RX ADMIN — BUMETANIDE 1 MG: 0.25 INJECTION INTRAMUSCULAR; INTRAVENOUS at 08:29

## 2024-01-01 RX ADMIN — BUMETANIDE 1 MG: 0.25 INJECTION INTRAMUSCULAR; INTRAVENOUS at 17:49

## 2024-01-01 RX ADMIN — LORAZEPAM 0.5 MG: 2 INJECTION INTRAMUSCULAR; INTRAVENOUS at 13:07

## 2024-01-01 RX ADMIN — OXYCODONE HYDROCHLORIDE 5 MG: 5 TABLET ORAL at 16:26

## 2024-01-01 RX ADMIN — HYDROMORPHONE HYDROCHLORIDE 0.2 MG: 0.2 INJECTION, SOLUTION INTRAMUSCULAR; INTRAVENOUS; SUBCUTANEOUS at 02:01

## 2024-01-01 RX ADMIN — RIFAXIMIN 550 MG: 550 TABLET ORAL at 15:17

## 2024-01-01 RX ADMIN — INSULIN LISPRO 6 UNITS: 100 INJECTION, SOLUTION INTRAVENOUS; SUBCUTANEOUS at 00:36

## 2024-01-01 RX ADMIN — LACTULOSE 20 G: 20 SOLUTION ORAL at 17:00

## 2024-01-01 RX ADMIN — CEFEPIME 2000 MG: 2 INJECTION, POWDER, FOR SOLUTION INTRAVENOUS at 10:58

## 2024-01-01 RX ADMIN — ALBUMIN (HUMAN) 12.5 G: 0.25 INJECTION, SOLUTION INTRAVENOUS at 10:41

## 2024-01-01 RX ADMIN — METOROPROLOL TARTRATE 5 MG: 5 INJECTION, SOLUTION INTRAVENOUS at 14:14

## 2024-01-01 RX ADMIN — LACTULOSE 20 G: 20 SOLUTION ORAL at 08:37

## 2024-01-01 RX ADMIN — CHLORHEXIDINE GLUCONATE 15 ML: 1.2 RINSE ORAL at 08:22

## 2024-01-01 RX ADMIN — CEFEPIME 2000 MG: 2 INJECTION, POWDER, FOR SOLUTION INTRAVENOUS at 03:56

## 2024-01-01 RX ADMIN — HYDROMORPHONE HYDROCHLORIDE 0.5 MG: 1 INJECTION, SOLUTION INTRAMUSCULAR; INTRAVENOUS; SUBCUTANEOUS at 00:17

## 2024-01-01 RX ADMIN — NOREPINEPHRINE BITARTRATE 7 MCG/MIN: 1 INJECTION INTRAVENOUS at 08:43

## 2024-01-01 RX ADMIN — HEPARIN SODIUM 5000 UNITS: 5000 INJECTION INTRAVENOUS; SUBCUTANEOUS at 21:38

## 2024-01-01 RX ADMIN — INSULIN LISPRO 12 UNITS: 100 INJECTION, SOLUTION INTRAVENOUS; SUBCUTANEOUS at 16:32

## 2024-01-01 RX ADMIN — MAGNESIUM SULFATE HEPTAHYDRATE 2 G: 40 INJECTION, SOLUTION INTRAVENOUS at 08:00

## 2024-01-01 RX ADMIN — RIFAXIMIN 550 MG: 550 TABLET ORAL at 08:48

## 2024-01-01 RX ADMIN — POTASSIUM PHOSPHATE, MONOBASIC AND POTASSIUM PHOSPHATE, DIBASIC 21 MMOL: 224; 236 INJECTION, SOLUTION, CONCENTRATE INTRAVENOUS at 06:43

## 2024-01-01 RX ADMIN — ALBUMIN (HUMAN) 25 G: 0.25 INJECTION, SOLUTION INTRAVENOUS at 12:17

## 2024-01-01 RX ADMIN — SODIUM CHLORIDE, SODIUM GLUCONATE, SODIUM ACETATE, POTASSIUM CHLORIDE, MAGNESIUM CHLORIDE, SODIUM PHOSPHATE, DIBASIC, AND POTASSIUM PHOSPHATE 1000 ML: .53; .5; .37; .037; .03; .012; .00082 INJECTION, SOLUTION INTRAVENOUS at 08:43

## 2024-01-01 RX ADMIN — HEPARIN SODIUM 5000 UNITS: 5000 INJECTION INTRAVENOUS; SUBCUTANEOUS at 21:02

## 2024-01-01 RX ADMIN — INSULIN LISPRO 3 UNITS: 100 INJECTION, SOLUTION INTRAVENOUS; SUBCUTANEOUS at 21:45

## 2024-01-01 RX ADMIN — LACTULOSE 20 G: 20 SOLUTION ORAL at 08:29

## 2024-01-01 RX ADMIN — ALBUMIN (HUMAN) 12.5 G: 0.25 INJECTION, SOLUTION INTRAVENOUS at 15:25

## 2024-01-01 RX ADMIN — ALBUMIN (HUMAN) 12.5 G: 0.25 INJECTION, SOLUTION INTRAVENOUS at 10:08

## 2024-01-01 RX ADMIN — INSULIN LISPRO 1 UNITS: 100 INJECTION, SOLUTION INTRAVENOUS; SUBCUTANEOUS at 10:44

## 2024-01-01 RX ADMIN — HEPARIN SODIUM 5000 UNITS: 5000 INJECTION INTRAVENOUS; SUBCUTANEOUS at 05:53

## 2024-01-01 RX ADMIN — CEFEPIME 1000 MG: 1 INJECTION, POWDER, FOR SOLUTION INTRAMUSCULAR; INTRAVENOUS at 05:16

## 2024-01-01 RX ADMIN — LACTULOSE 20 G: 20 SOLUTION ORAL at 09:25

## 2024-01-01 RX ADMIN — BUMETANIDE 1 MG: 0.25 INJECTION INTRAMUSCULAR; INTRAVENOUS at 08:06

## 2024-01-01 RX ADMIN — DEXAMETHASONE SODIUM PHOSPHATE 2 MG: 4 INJECTION INTRA-ARTICULAR; INTRALESIONAL; INTRAMUSCULAR; INTRAVENOUS; SOFT TISSUE at 13:45

## 2024-01-01 RX ADMIN — HEPARIN SODIUM 5000 UNITS: 5000 INJECTION INTRAVENOUS; SUBCUTANEOUS at 05:41

## 2024-01-01 RX ADMIN — METOROPROLOL TARTRATE 5 MG: 5 INJECTION, SOLUTION INTRAVENOUS at 02:54

## 2024-01-01 RX ADMIN — HYDROMORPHONE HYDROCHLORIDE 0.2 MG: 0.2 INJECTION, SOLUTION INTRAMUSCULAR; INTRAVENOUS; SUBCUTANEOUS at 18:05

## 2024-01-01 RX ADMIN — ALBUMIN (HUMAN) 12.5 G: 0.25 INJECTION, SOLUTION INTRAVENOUS at 04:42

## 2024-01-01 RX ADMIN — VANCOMYCIN HYDROCHLORIDE 1250 MG: 5 INJECTION, POWDER, LYOPHILIZED, FOR SOLUTION INTRAVENOUS at 21:24

## 2024-01-01 RX ADMIN — ALBUMIN (HUMAN) 12.5 G: 0.25 INJECTION, SOLUTION INTRAVENOUS at 21:44

## 2024-01-01 RX ADMIN — CEFEPIME 1000 MG: 1 INJECTION, POWDER, FOR SOLUTION INTRAMUSCULAR; INTRAVENOUS at 04:25

## 2024-01-01 RX ADMIN — METOROPROLOL TARTRATE 5 MG: 5 INJECTION, SOLUTION INTRAVENOUS at 03:27

## 2024-01-01 RX ADMIN — HEPARIN SODIUM 5000 UNITS: 5000 INJECTION INTRAVENOUS; SUBCUTANEOUS at 06:37

## 2024-01-01 RX ADMIN — METOROPROLOL TARTRATE 2.5 MG: 5 INJECTION, SOLUTION INTRAVENOUS at 00:34

## 2024-01-01 RX ADMIN — HEPARIN SODIUM 5000 UNITS: 5000 INJECTION INTRAVENOUS; SUBCUTANEOUS at 21:25

## 2024-01-01 RX ADMIN — NOREPINEPHRINE BITARTRATE 10 MCG/MIN: 1 INJECTION INTRAVENOUS at 03:33

## 2024-01-01 RX ADMIN — CEFEPIME 2000 MG: 2 INJECTION, POWDER, FOR SOLUTION INTRAVENOUS at 20:10

## 2024-01-01 RX ADMIN — BUMETANIDE 1 MG: 0.25 INJECTION INTRAMUSCULAR; INTRAVENOUS at 17:33

## 2024-01-01 RX ADMIN — HEPARIN SODIUM 5000 UNITS: 5000 INJECTION INTRAVENOUS; SUBCUTANEOUS at 13:23

## 2024-01-01 RX ADMIN — DEXAMETHASONE SODIUM PHOSPHATE 2 MG: 4 INJECTION INTRA-ARTICULAR; INTRALESIONAL; INTRAMUSCULAR; INTRAVENOUS; SOFT TISSUE at 05:03

## 2024-01-01 RX ADMIN — SODIUM CHLORIDE 4 UNITS/HR: 9 INJECTION, SOLUTION INTRAVENOUS at 19:59

## 2024-01-01 RX ADMIN — LACTULOSE 20 G: 20 SOLUTION ORAL at 15:20

## 2024-01-01 RX ADMIN — MICONAZOLE NITRATE: 20 CREAM TOPICAL at 08:09

## 2024-01-01 RX ADMIN — HEPARIN SODIUM 5000 UNITS: 5000 INJECTION INTRAVENOUS; SUBCUTANEOUS at 21:44

## 2024-01-01 RX ADMIN — HYDROMORPHONE HYDROCHLORIDE 0.2 MG: 0.2 INJECTION, SOLUTION INTRAMUSCULAR; INTRAVENOUS; SUBCUTANEOUS at 12:40

## 2024-01-01 RX ADMIN — HEPARIN SODIUM 5000 UNITS: 5000 INJECTION INTRAVENOUS; SUBCUTANEOUS at 15:07

## 2024-01-01 RX ADMIN — OXYCODONE HYDROCHLORIDE 5 MG: 5 TABLET ORAL at 13:45

## 2024-01-01 RX ADMIN — METOROPROLOL TARTRATE 5 MG: 5 INJECTION, SOLUTION INTRAVENOUS at 04:16

## 2024-01-01 RX ADMIN — SODIUM CHLORIDE 250 ML: 0.9 INJECTION, SOLUTION INTRAVENOUS at 09:16

## 2024-01-01 RX ADMIN — OXYCODONE HYDROCHLORIDE 5 MG: 5 TABLET ORAL at 11:00

## 2024-01-01 RX ADMIN — CHLORHEXIDINE GLUCONATE 15 ML: 1.2 RINSE ORAL at 21:12

## 2024-01-01 RX ADMIN — NOREPINEPHRINE BITARTRATE 9 MCG/MIN: 1 INJECTION INTRAVENOUS at 16:17

## 2024-01-01 RX ADMIN — METOROPROLOL TARTRATE 2.5 MG: 5 INJECTION, SOLUTION INTRAVENOUS at 03:22

## 2024-01-01 RX ADMIN — HEPARIN SODIUM 5000 UNITS: 5000 INJECTION INTRAVENOUS; SUBCUTANEOUS at 05:21

## 2024-01-01 RX ADMIN — MICONAZOLE NITRATE: 20 CREAM TOPICAL at 08:46

## 2024-01-01 RX ADMIN — INSULIN LISPRO 1 UNITS: 100 INJECTION, SOLUTION INTRAVENOUS; SUBCUTANEOUS at 18:15

## 2024-01-01 RX ADMIN — INSULIN LISPRO 8 UNITS: 100 INJECTION, SOLUTION INTRAVENOUS; SUBCUTANEOUS at 13:07

## 2024-01-01 RX ADMIN — PIPERACILLIN SODIUM AND TAZOBACTAM SODIUM 4.5 G: 36; 4.5 INJECTION, POWDER, LYOPHILIZED, FOR SOLUTION INTRAVENOUS at 07:37

## 2024-01-01 RX ADMIN — INSULIN LISPRO 6 UNITS: 100 INJECTION, SOLUTION INTRAVENOUS; SUBCUTANEOUS at 17:36

## 2024-01-01 RX ADMIN — HEPARIN SODIUM 5000 UNITS: 5000 INJECTION INTRAVENOUS; SUBCUTANEOUS at 15:50

## 2024-01-01 RX ADMIN — CEFEPIME 2000 MG: 2 INJECTION, POWDER, FOR SOLUTION INTRAVENOUS at 11:45

## 2024-01-01 RX ADMIN — BUMETANIDE 1 MG: 0.25 INJECTION INTRAMUSCULAR; INTRAVENOUS at 09:12

## 2024-01-01 RX ADMIN — LORAZEPAM 0.5 MG: 2 INJECTION INTRAMUSCULAR; INTRAVENOUS at 10:23

## 2024-01-01 RX ADMIN — INSULIN LISPRO 5 UNITS: 100 INJECTION, SOLUTION INTRAVENOUS; SUBCUTANEOUS at 15:30

## 2024-01-01 RX ADMIN — FENTANYL CITRATE 50 MCG: 50 INJECTION INTRAMUSCULAR; INTRAVENOUS at 09:41

## 2024-01-01 RX ADMIN — LACTULOSE 20 G: 20 SOLUTION ORAL at 21:25

## 2024-01-01 RX ADMIN — ALBUMIN (HUMAN) 12.5 G: 0.25 INJECTION, SOLUTION INTRAVENOUS at 04:31

## 2024-01-01 RX ADMIN — METOROPROLOL TARTRATE 5 MG: 5 INJECTION, SOLUTION INTRAVENOUS at 09:08

## 2024-01-01 RX ADMIN — INSULIN LISPRO 2 UNITS: 100 INJECTION, SOLUTION INTRAVENOUS; SUBCUTANEOUS at 08:48

## 2024-01-01 RX ADMIN — HEPARIN SODIUM 5000 UNITS: 5000 INJECTION INTRAVENOUS; SUBCUTANEOUS at 05:03

## 2024-01-01 RX ADMIN — HEPARIN SODIUM 5000 UNITS: 5000 INJECTION INTRAVENOUS; SUBCUTANEOUS at 05:16

## 2024-01-01 RX ADMIN — CEFEPIME 2000 MG: 2 INJECTION, POWDER, FOR SOLUTION INTRAVENOUS at 20:34

## 2024-01-01 RX ADMIN — CHLORHEXIDINE GLUCONATE 15 ML: 1.2 RINSE ORAL at 21:25

## 2024-01-01 RX ADMIN — ALBUMIN (HUMAN) 12.5 G: 0.25 INJECTION, SOLUTION INTRAVENOUS at 21:38

## 2024-01-01 RX ADMIN — NOREPINEPHRINE BITARTRATE 7 MCG/MIN: 1 INJECTION INTRAVENOUS at 21:26

## 2024-01-01 RX ADMIN — MAGNESIUM SULFATE HEPTAHYDRATE 1 G: 1 INJECTION, SOLUTION INTRAVENOUS at 03:20

## 2024-01-01 RX ADMIN — HEPARIN SODIUM 5000 UNITS: 5000 INJECTION INTRAVENOUS; SUBCUTANEOUS at 13:46

## 2024-01-01 RX ADMIN — BUMETANIDE 1 MG: 0.25 INJECTION INTRAMUSCULAR; INTRAVENOUS at 10:08

## 2024-01-01 RX ADMIN — CHLORHEXIDINE GLUCONATE 15 ML: 1.2 RINSE ORAL at 08:40

## 2024-01-01 RX ADMIN — LACTULOSE 20 G: 20 SOLUTION ORAL at 08:22

## 2024-01-01 RX ADMIN — HYDROMORPHONE HYDROCHLORIDE 0.2 MG: 0.2 INJECTION, SOLUTION INTRAMUSCULAR; INTRAVENOUS; SUBCUTANEOUS at 14:36

## 2024-01-01 RX ADMIN — INSULIN LISPRO 8 UNITS: 100 INJECTION, SOLUTION INTRAVENOUS; SUBCUTANEOUS at 11:07

## 2024-01-01 RX ADMIN — INSULIN LISPRO 2 UNITS: 100 INJECTION, SOLUTION INTRAVENOUS; SUBCUTANEOUS at 08:30

## 2024-01-01 RX ADMIN — HEPARIN SODIUM 5000 UNITS: 5000 INJECTION INTRAVENOUS; SUBCUTANEOUS at 14:35

## 2024-01-01 RX ADMIN — INSULIN GLARGINE 40 UNITS: 100 INJECTION, SOLUTION SUBCUTANEOUS at 08:30

## 2024-01-01 RX ADMIN — ALBUMIN (HUMAN) 12.5 G: 0.25 INJECTION, SOLUTION INTRAVENOUS at 10:12

## 2024-01-01 RX ADMIN — LACTULOSE 20 G: 20 SOLUTION ORAL at 16:51

## 2024-01-01 RX ADMIN — ALBUMIN (HUMAN) 12.5 G: 12.5 INJECTION, SOLUTION INTRAVENOUS at 15:50

## 2024-01-01 RX ADMIN — CEFEPIME 2000 MG: 2 INJECTION, POWDER, FOR SOLUTION INTRAVENOUS at 20:07

## 2024-01-01 RX ADMIN — CEFEPIME 2000 MG: 2 INJECTION, POWDER, FOR SOLUTION INTRAVENOUS at 19:59

## 2024-01-01 RX ADMIN — CEFEPIME 1000 MG: 1 INJECTION, POWDER, FOR SOLUTION INTRAMUSCULAR; INTRAVENOUS at 14:58

## 2024-01-01 RX ADMIN — MICONAZOLE NITRATE: 20 CREAM TOPICAL at 17:08

## 2024-01-01 RX ADMIN — VANCOMYCIN HYDROCHLORIDE 1250 MG: 5 INJECTION, POWDER, LYOPHILIZED, FOR SOLUTION INTRAVENOUS at 06:48

## 2024-01-01 RX ADMIN — NOREPINEPHRINE BITARTRATE 3 MCG/MIN: 1 INJECTION INTRAVENOUS at 09:11

## 2024-01-01 RX ADMIN — CEFEPIME 1000 MG: 1 INJECTION, POWDER, FOR SOLUTION INTRAMUSCULAR; INTRAVENOUS at 16:51

## 2024-01-01 RX ADMIN — ALBUMIN (HUMAN) 25 G: 12.5 INJECTION, SOLUTION INTRAVENOUS at 07:33

## 2024-01-01 RX ADMIN — DEXAMETHASONE SODIUM PHOSPHATE 2 MG: 4 INJECTION INTRA-ARTICULAR; INTRALESIONAL; INTRAMUSCULAR; INTRAVENOUS; SOFT TISSUE at 12:47

## 2024-01-01 RX ADMIN — CHLORHEXIDINE GLUCONATE 15 ML: 1.2 RINSE ORAL at 08:06

## 2024-01-01 RX ADMIN — INSULIN LISPRO 3 UNITS: 100 INJECTION, SOLUTION INTRAVENOUS; SUBCUTANEOUS at 16:24

## 2024-01-01 RX ADMIN — NOREPINEPHRINE BITARTRATE 5 MCG/MIN: 1 INJECTION INTRAVENOUS at 22:00

## 2024-01-01 RX ADMIN — ALBUMIN (HUMAN) 12.5 G: 0.25 INJECTION, SOLUTION INTRAVENOUS at 03:56

## 2024-01-01 RX ADMIN — DEXTROSE MONOHYDRATE 25 ML: 25 INJECTION, SOLUTION INTRAVENOUS at 08:07

## 2024-01-01 RX ADMIN — INSULIN LISPRO 6 UNITS: 100 INJECTION, SOLUTION INTRAVENOUS; SUBCUTANEOUS at 21:38

## 2024-01-01 RX ADMIN — ALBUMIN (HUMAN) 12.5 G: 0.25 INJECTION, SOLUTION INTRAVENOUS at 04:25

## 2024-01-01 RX ADMIN — OXYCODONE HYDROCHLORIDE 5 MG: 5 TABLET ORAL at 11:05

## 2024-01-01 RX ADMIN — VANCOMYCIN HYDROCHLORIDE 2000 MG: 5 INJECTION, POWDER, LYOPHILIZED, FOR SOLUTION INTRAVENOUS at 09:05

## 2024-01-01 RX ADMIN — ALBUMIN (HUMAN) 12.5 G: 0.25 INJECTION, SOLUTION INTRAVENOUS at 16:20

## 2024-01-01 RX ADMIN — MICONAZOLE NITRATE: 20 CREAM TOPICAL at 17:52

## 2024-01-01 RX ADMIN — ALBUMIN (HUMAN) 12.5 G: 0.25 INJECTION, SOLUTION INTRAVENOUS at 09:56

## 2024-01-01 RX ADMIN — ALBUMIN (HUMAN) 25 G: 0.25 INJECTION, SOLUTION INTRAVENOUS at 20:34

## 2024-01-01 RX ADMIN — CEFEPIME 2000 MG: 2 INJECTION, POWDER, FOR SOLUTION INTRAVENOUS at 02:56

## 2024-01-01 RX ADMIN — ALBUMIN (HUMAN) 12.5 G: 0.25 INJECTION, SOLUTION INTRAVENOUS at 03:25

## 2024-01-01 RX ADMIN — ALBUMIN (HUMAN) 12.5 G: 0.25 INJECTION, SOLUTION INTRAVENOUS at 21:12

## 2024-01-01 RX ADMIN — ALBUMIN (HUMAN) 12.5 G: 0.25 INJECTION, SOLUTION INTRAVENOUS at 21:02

## 2024-01-01 RX ADMIN — ALBUMIN (HUMAN) 12.5 G: 0.25 INJECTION, SOLUTION INTRAVENOUS at 15:07

## 2024-01-01 RX ADMIN — INSULIN GLARGINE 10 UNITS: 100 INJECTION, SOLUTION SUBCUTANEOUS at 10:42

## 2024-01-01 RX ADMIN — MIDODRINE HYDROCHLORIDE 10 MG: 5 TABLET ORAL at 11:43

## 2024-01-01 RX ADMIN — INSULIN LISPRO 2 UNITS: 100 INJECTION, SOLUTION INTRAVENOUS; SUBCUTANEOUS at 21:36

## 2024-01-01 RX ADMIN — CHLORHEXIDINE GLUCONATE 15 ML: 1.2 RINSE ORAL at 08:30

## 2024-01-01 RX ADMIN — MICONAZOLE NITRATE: 20 CREAM TOPICAL at 08:23

## 2024-01-01 RX ADMIN — NOREPINEPHRINE BITARTRATE 8 MCG/MIN: 1 INJECTION INTRAVENOUS at 23:58

## 2024-01-01 RX ADMIN — CHLORHEXIDINE GLUCONATE 15 ML: 1.2 RINSE ORAL at 08:29

## 2024-01-01 RX ADMIN — INSULIN LISPRO 2 UNITS: 100 INJECTION, SOLUTION INTRAVENOUS; SUBCUTANEOUS at 22:08

## 2024-01-01 RX ADMIN — ALBUMIN (HUMAN) 12.5 G: 0.25 INJECTION, SOLUTION INTRAVENOUS at 10:36

## 2024-01-01 RX ADMIN — HYDROMORPHONE HYDROCHLORIDE 0.2 MG: 0.2 INJECTION, SOLUTION INTRAMUSCULAR; INTRAVENOUS; SUBCUTANEOUS at 18:13

## 2024-01-01 RX ADMIN — MIDODRINE HYDROCHLORIDE 10 MG: 5 TABLET ORAL at 15:20

## 2024-01-01 RX ADMIN — INSULIN HUMAN 10 UNITS: 100 INJECTION, SOLUTION PARENTERAL at 08:08

## 2024-01-01 RX ADMIN — INSULIN LISPRO 8 UNITS: 100 INJECTION, SOLUTION INTRAVENOUS; SUBCUTANEOUS at 08:31

## 2024-01-01 RX ADMIN — ALBUMIN (HUMAN) 25 G: 0.25 INJECTION, SOLUTION INTRAVENOUS at 03:28

## 2024-02-14 ENCOUNTER — TELEPHONE (OUTPATIENT)
Age: 69
End: 2024-02-14

## 2024-02-14 NOTE — TELEPHONE ENCOUNTER
Sharon called in for her  Escobar . Patient is currently in [place] will be returning back on Saturday . Kaylah stated escobar was in the er in [place] has an infection on his 4and 5 toe on his left foot . X-ray were taken and was told for Escobar to follow up with his ID doctor  due to bacteria getting to his bone . Warm transfer to ID CTS

## 2024-02-14 NOTE — TELEPHONE ENCOUNTER
Hello,  Please advise if the following patient can be forced onto the schedule:    Patient: Isael Avendano    : [birth year]    MRN: [id number]     Call back #: [phone]    Insurance: medicare    Reason for appointment: TWO OPEN WOUNDS ON LEFT FOOT-patient comes home     Requested doctor/location: [facility]      Thank you.

## 2024-02-14 NOTE — TELEPHONE ENCOUNTER
Kaylah, patients wife calling regarding recent foot wound infection between 4th and 5th toe recently cultured to be from stagnant water after patient, in [place], stepped into boat loading doc with shoes and socks. Patient has been seeing podiatrist in [place] being prescribed antibiotics and Silver silver ointment.    Patient returning to [place] on Saturday. Advised to see current podiatrist, Dr. Landin, with [facility] and if he determines untreatable without ID, to write a referral.     Advised to continue with current regime and treatment plan.      All questions answered. No further needs at the moment.

## 2024-02-15 NOTE — TELEPHONE ENCOUNTER
please review yesterday's telephone messages. Pt was in [place] ED with infection to his foot. Advice where you'd like him scheduled/forced on?

## 2024-02-15 NOTE — TELEPHONE ENCOUNTER
Please see below messages. Patient is in [place] but returning this weekend. Requesting a forced appt with Dr. Landin. Please return call to advise if this is possible or not. Thank you

## 2024-02-16 ENCOUNTER — TELEPHONE (OUTPATIENT)
Age: 69
End: 2024-02-16

## 2024-02-16 NOTE — TELEPHONE ENCOUNTER
Caller: Kaylah Avendano     Doctor/Office: Dr. Landin/Danny    #: [phone]    Escalation: Care Patient got an infection between toes while in [place]. The dr in [place] thinks the infection may have went to the bone. Should the patient have an MRI before the appt with Dr. Landin on 2-21? I believe the dr in [facility] may have ordered one. Please return call to Kaylah to clarify and also answer her questions. Thank you

## 2024-02-18 ENCOUNTER — HOSPITAL ENCOUNTER (INPATIENT)
Facility: HOSPITAL | Age: 69
LOS: 10 days | Discharge: HOME | DRG: 378 | End: 2024-02-28
Attending: EMERGENCY MEDICINE | Admitting: INTERNAL MEDICINE
Payer: MEDICARE

## 2024-02-18 ENCOUNTER — APPOINTMENT (EMERGENCY)
Dept: RADIOLOGY | Facility: HOSPITAL | Age: 69
DRG: 378 | End: 2024-02-18
Payer: MEDICARE

## 2024-02-18 DIAGNOSIS — K70.31 ALCOHOLIC CIRRHOSIS OF LIVER WITH ASCITES (HCC): ICD-10-CM

## 2024-02-18 DIAGNOSIS — D61.818 PANCYTOPENIA (HCC): ICD-10-CM

## 2024-02-18 DIAGNOSIS — R53.1 GENERALIZED WEAKNESS: Primary | ICD-10-CM

## 2024-02-18 DIAGNOSIS — M86.9 OSTEOMYELITIS (HCC): ICD-10-CM

## 2024-02-18 DIAGNOSIS — E11.9 DM (DIABETES MELLITUS) (HCC): ICD-10-CM

## 2024-02-18 DIAGNOSIS — I10 HTN (HYPERTENSION): ICD-10-CM

## 2024-02-18 DIAGNOSIS — R22.2 CHEST WALL MASS: ICD-10-CM

## 2024-02-18 DIAGNOSIS — K74.60 CIRRHOSIS (HCC): ICD-10-CM

## 2024-02-18 DIAGNOSIS — D64.9 ANEMIA: ICD-10-CM

## 2024-02-18 DIAGNOSIS — R06.09 DYSPNEA ON EXERTION: ICD-10-CM

## 2024-02-18 DIAGNOSIS — I51.89 DIASTOLIC DYSFUNCTION: ICD-10-CM

## 2024-02-18 DIAGNOSIS — R05.3 CHRONIC COUGH: ICD-10-CM

## 2024-02-18 DIAGNOSIS — L97.521 ULCER OF LEFT FOOT, LIMITED TO BREAKDOWN OF SKIN (HCC): ICD-10-CM

## 2024-02-18 PROBLEM — A41.9 SEPSIS (HCC): Status: RESOLVED | Noted: 2023-12-20 | Resolved: 2024-02-18

## 2024-02-18 LAB
2HR DELTA HS TROPONIN: -2 NG/L
4HR DELTA HS TROPONIN: -1 NG/L
ABO GROUP BLD: NORMAL
ALBUMIN SERPL BCP-MCNC: 2.9 G/DL (ref 3.5–5)
ALP SERPL-CCNC: 219 U/L (ref 34–104)
ALT SERPL W P-5'-P-CCNC: 42 U/L (ref 7–52)
ANION GAP SERPL CALCULATED.3IONS-SCNC: 4 MMOL/L
APTT PPP: 50 SECONDS (ref 23–37)
AST SERPL W P-5'-P-CCNC: 50 U/L (ref 13–39)
ATRIAL RATE: 98 BPM
BASOPHILS # BLD AUTO: 0.01 THOUSANDS/ÂΜL (ref 0–0.1)
BASOPHILS # BLD AUTO: 0.01 THOUSANDS/ÂΜL (ref 0–0.1)
BASOPHILS NFR BLD AUTO: 0 % (ref 0–1)
BASOPHILS NFR BLD AUTO: 0 % (ref 0–1)
BILIRUB SERPL-MCNC: 1.9 MG/DL (ref 0.2–1)
BLD GP AB SCN SERPL QL: NEGATIVE
BNP SERPL-MCNC: 576 PG/ML (ref 0–100)
BUN SERPL-MCNC: 25 MG/DL (ref 5–25)
CALCIUM ALBUM COR SERPL-MCNC: 9.7 MG/DL (ref 8.3–10.1)
CALCIUM SERPL-MCNC: 8.8 MG/DL (ref 8.4–10.2)
CARDIAC TROPONIN I PNL SERPL HS: 20 NG/L
CARDIAC TROPONIN I PNL SERPL HS: 21 NG/L
CARDIAC TROPONIN I PNL SERPL HS: 22 NG/L
CHLORIDE SERPL-SCNC: 106 MMOL/L (ref 96–108)
CO2 SERPL-SCNC: 28 MMOL/L (ref 21–32)
CREAT SERPL-MCNC: 1.01 MG/DL (ref 0.6–1.3)
EOSINOPHIL # BLD AUTO: 0.21 THOUSAND/ÂΜL (ref 0–0.61)
EOSINOPHIL # BLD AUTO: 0.22 THOUSAND/ÂΜL (ref 0–0.61)
EOSINOPHIL NFR BLD AUTO: 6 % (ref 0–6)
EOSINOPHIL NFR BLD AUTO: 7 % (ref 0–6)
ERYTHROCYTE [DISTWIDTH] IN BLOOD BY AUTOMATED COUNT: 18.9 % (ref 11.6–15.1)
ERYTHROCYTE [DISTWIDTH] IN BLOOD BY AUTOMATED COUNT: 19 % (ref 11.6–15.1)
GFR SERPL CREATININE-BSD FRML MDRD: 75 ML/MIN/1.73SQ M
GLUCOSE SERPL-MCNC: 113 MG/DL (ref 65–140)
GLUCOSE SERPL-MCNC: 157 MG/DL (ref 65–140)
GLUCOSE SERPL-MCNC: 256 MG/DL (ref 65–140)
HCT VFR BLD AUTO: 17.6 % (ref 36.5–49.3)
HCT VFR BLD AUTO: 17.6 % (ref 36.5–49.3)
HCT VFR BLD AUTO: 19.3 % (ref 36.5–49.3)
HGB BLD-MCNC: 4.8 G/DL (ref 12–17)
HGB BLD-MCNC: 4.9 G/DL (ref 12–17)
HGB BLD-MCNC: 5.7 G/DL (ref 12–17)
IMM GRANULOCYTES # BLD AUTO: 0.02 THOUSAND/UL (ref 0–0.2)
IMM GRANULOCYTES # BLD AUTO: 0.03 THOUSAND/UL (ref 0–0.2)
IMM GRANULOCYTES NFR BLD AUTO: 1 % (ref 0–2)
IMM GRANULOCYTES NFR BLD AUTO: 1 % (ref 0–2)
INR PPP: 1.49 (ref 0.84–1.19)
LIPASE SERPL-CCNC: 24 U/L (ref 11–82)
LYMPHOCYTES # BLD AUTO: 0.62 THOUSANDS/ÂΜL (ref 0.6–4.47)
LYMPHOCYTES # BLD AUTO: 0.64 THOUSANDS/ÂΜL (ref 0.6–4.47)
LYMPHOCYTES NFR BLD AUTO: 17 % (ref 14–44)
LYMPHOCYTES NFR BLD AUTO: 20 % (ref 14–44)
MCH RBC QN AUTO: 20.3 PG (ref 26.8–34.3)
MCH RBC QN AUTO: 20.5 PG (ref 26.8–34.3)
MCHC RBC AUTO-ENTMCNC: 27.3 G/DL (ref 31.4–37.4)
MCHC RBC AUTO-ENTMCNC: 27.8 G/DL (ref 31.4–37.4)
MCV RBC AUTO: 74 FL (ref 82–98)
MCV RBC AUTO: 74 FL (ref 82–98)
MONOCYTES # BLD AUTO: 0.25 THOUSAND/ÂΜL (ref 0.17–1.22)
MONOCYTES # BLD AUTO: 0.27 THOUSAND/ÂΜL (ref 0.17–1.22)
MONOCYTES NFR BLD AUTO: 8 % (ref 4–12)
MONOCYTES NFR BLD AUTO: 8 % (ref 4–12)
NEUTROPHILS # BLD AUTO: 2.11 THOUSANDS/ÂΜL (ref 1.85–7.62)
NEUTROPHILS # BLD AUTO: 2.45 THOUSANDS/ÂΜL (ref 1.85–7.62)
NEUTS SEG NFR BLD AUTO: 64 % (ref 43–75)
NEUTS SEG NFR BLD AUTO: 68 % (ref 43–75)
NRBC BLD AUTO-RTO: 1 /100 WBCS
NRBC BLD AUTO-RTO: 1 /100 WBCS
P AXIS: 84 DEGREES
PLATELET # BLD AUTO: 45 THOUSANDS/UL (ref 149–390)
PLATELET # BLD AUTO: 47 THOUSANDS/UL (ref 149–390)
PLATELET BLD QL SMEAR: ABNORMAL
POTASSIUM SERPL-SCNC: 3.7 MMOL/L (ref 3.5–5.3)
PR INTERVAL: 156 MS
PROT SERPL-MCNC: 6.3 G/DL (ref 6.4–8.4)
PROTHROMBIN TIME: 17.8 SECONDS (ref 11.6–14.5)
QRS AXIS: 75 DEGREES
QRSD INTERVAL: 86 MS
QT INTERVAL: 382 MS
QTC INTERVAL: 487 MS
RBC # BLD AUTO: 2.37 MILLION/UL (ref 3.88–5.62)
RBC # BLD AUTO: 2.39 MILLION/UL (ref 3.88–5.62)
RBC MORPH BLD: PRESENT
RH BLD: POSITIVE
SODIUM SERPL-SCNC: 138 MMOL/L (ref 135–147)
SPECIMEN EXPIRATION DATE: NORMAL
T WAVE AXIS: -47 DEGREES
VENTRICULAR RATE: 98 BPM
WBC # BLD AUTO: 3.24 THOUSAND/UL (ref 4.31–10.16)
WBC # BLD AUTO: 3.6 THOUSAND/UL (ref 4.31–10.16)

## 2024-02-18 PROCEDURE — 99285 EMERGENCY DEPT VISIT HI MDM: CPT

## 2024-02-18 PROCEDURE — 93010 ELECTROCARDIOGRAM REPORT: CPT | Performed by: INTERNAL MEDICINE

## 2024-02-18 PROCEDURE — 76604 US EXAM CHEST: CPT | Performed by: EMERGENCY MEDICINE

## 2024-02-18 PROCEDURE — 82948 REAGENT STRIP/BLOOD GLUCOSE: CPT

## 2024-02-18 PROCEDURE — 83690 ASSAY OF LIPASE: CPT | Performed by: EMERGENCY MEDICINE

## 2024-02-18 PROCEDURE — 85730 THROMBOPLASTIN TIME PARTIAL: CPT | Performed by: EMERGENCY MEDICINE

## 2024-02-18 PROCEDURE — 85025 COMPLETE CBC W/AUTO DIFF WBC: CPT | Performed by: EMERGENCY MEDICINE

## 2024-02-18 PROCEDURE — 86923 COMPATIBILITY TEST ELECTRIC: CPT

## 2024-02-18 PROCEDURE — 80053 COMPREHEN METABOLIC PANEL: CPT | Performed by: EMERGENCY MEDICINE

## 2024-02-18 PROCEDURE — 93308 TTE F-UP OR LMTD: CPT | Performed by: EMERGENCY MEDICINE

## 2024-02-18 PROCEDURE — P9016 RBC LEUKOCYTES REDUCED: HCPCS

## 2024-02-18 PROCEDURE — 93005 ELECTROCARDIOGRAM TRACING: CPT

## 2024-02-18 PROCEDURE — 86900 BLOOD TYPING SEROLOGIC ABO: CPT

## 2024-02-18 PROCEDURE — 86901 BLOOD TYPING SEROLOGIC RH(D): CPT

## 2024-02-18 PROCEDURE — 99285 EMERGENCY DEPT VISIT HI MDM: CPT | Performed by: EMERGENCY MEDICINE

## 2024-02-18 PROCEDURE — 36415 COLL VENOUS BLD VENIPUNCTURE: CPT | Performed by: EMERGENCY MEDICINE

## 2024-02-18 PROCEDURE — 99223 1ST HOSP IP/OBS HIGH 75: CPT | Performed by: INTERNAL MEDICINE

## 2024-02-18 PROCEDURE — 76705 ECHO EXAM OF ABDOMEN: CPT | Performed by: EMERGENCY MEDICINE

## 2024-02-18 PROCEDURE — 71046 X-RAY EXAM CHEST 2 VIEWS: CPT

## 2024-02-18 PROCEDURE — 87205 SMEAR GRAM STAIN: CPT | Performed by: INTERNAL MEDICINE

## 2024-02-18 PROCEDURE — 85025 COMPLETE CBC W/AUTO DIFF WBC: CPT

## 2024-02-18 PROCEDURE — 85610 PROTHROMBIN TIME: CPT | Performed by: EMERGENCY MEDICINE

## 2024-02-18 PROCEDURE — 84484 ASSAY OF TROPONIN QUANT: CPT | Performed by: EMERGENCY MEDICINE

## 2024-02-18 PROCEDURE — 85018 HEMOGLOBIN: CPT | Performed by: INTERNAL MEDICINE

## 2024-02-18 PROCEDURE — 85014 HEMATOCRIT: CPT | Performed by: INTERNAL MEDICINE

## 2024-02-18 PROCEDURE — 30233N1 TRANSFUSION OF NONAUTOLOGOUS RED BLOOD CELLS INTO PERIPHERAL VEIN, PERCUTANEOUS APPROACH: ICD-10-PCS | Performed by: INTERNAL MEDICINE

## 2024-02-18 PROCEDURE — 87070 CULTURE OTHR SPECIMN AEROBIC: CPT | Performed by: INTERNAL MEDICINE

## 2024-02-18 PROCEDURE — 83880 ASSAY OF NATRIURETIC PEPTIDE: CPT | Performed by: EMERGENCY MEDICINE

## 2024-02-18 PROCEDURE — 86850 RBC ANTIBODY SCREEN: CPT

## 2024-02-18 RX ORDER — TRAZODONE HYDROCHLORIDE 50 MG/1
50 TABLET ORAL
Status: DISCONTINUED | OUTPATIENT
Start: 2024-02-18 | End: 2024-02-28 | Stop reason: HOSPADM

## 2024-02-18 RX ORDER — TAMSULOSIN HYDROCHLORIDE 0.4 MG/1
0.4 CAPSULE ORAL
Status: DISCONTINUED | OUTPATIENT
Start: 2024-02-18 | End: 2024-02-28 | Stop reason: HOSPADM

## 2024-02-18 RX ORDER — INSULIN LISPRO 100 [IU]/ML
1-5 INJECTION, SOLUTION INTRAVENOUS; SUBCUTANEOUS
Status: DISCONTINUED | OUTPATIENT
Start: 2024-02-18 | End: 2024-02-28 | Stop reason: HOSPADM

## 2024-02-18 RX ORDER — FUROSEMIDE 20 MG/1
20 TABLET ORAL
Status: DISCONTINUED | OUTPATIENT
Start: 2024-02-19 | End: 2024-02-21

## 2024-02-18 RX ORDER — ACETAMINOPHEN 325 MG/1
650 TABLET ORAL EVERY 6 HOURS PRN
Status: DISCONTINUED | OUTPATIENT
Start: 2024-02-18 | End: 2024-02-28 | Stop reason: HOSPADM

## 2024-02-18 RX ORDER — FUROSEMIDE 10 MG/ML
20 INJECTION INTRAMUSCULAR; INTRAVENOUS ONCE
Status: COMPLETED | OUTPATIENT
Start: 2024-02-18 | End: 2024-02-18

## 2024-02-18 RX ORDER — FUROSEMIDE 10 MG/ML
20 INJECTION INTRAMUSCULAR; INTRAVENOUS ONCE
Status: COMPLETED | OUTPATIENT
Start: 2024-02-19 | End: 2024-02-19

## 2024-02-18 RX ORDER — GLIMEPIRIDE 2 MG/1
2 TABLET ORAL 2 TIMES DAILY
Status: DISCONTINUED | OUTPATIENT
Start: 2024-02-18 | End: 2024-02-20

## 2024-02-18 RX ORDER — GLIMEPIRIDE 2 MG/1
4 TABLET ORAL 2 TIMES DAILY
Status: DISCONTINUED | OUTPATIENT
Start: 2024-02-18 | End: 2024-02-18

## 2024-02-18 RX ORDER — BENZONATATE 100 MG/1
200 CAPSULE ORAL 3 TIMES DAILY PRN
Status: DISCONTINUED | OUTPATIENT
Start: 2024-02-18 | End: 2024-02-28 | Stop reason: HOSPADM

## 2024-02-18 RX ORDER — INSULIN GLARGINE 100 [IU]/ML
30 INJECTION, SOLUTION SUBCUTANEOUS
Status: DISCONTINUED | OUTPATIENT
Start: 2024-02-18 | End: 2024-02-20

## 2024-02-18 RX ORDER — ONDANSETRON 2 MG/ML
4 INJECTION INTRAMUSCULAR; INTRAVENOUS EVERY 6 HOURS PRN
Status: DISCONTINUED | OUTPATIENT
Start: 2024-02-18 | End: 2024-02-28 | Stop reason: HOSPADM

## 2024-02-18 RX ORDER — FUROSEMIDE 20 MG/1
20 TABLET ORAL EVERY MORNING
Status: DISCONTINUED | OUTPATIENT
Start: 2024-02-19 | End: 2024-02-18

## 2024-02-18 RX ORDER — LEVOFLOXACIN 750 MG/1
750 TABLET, FILM COATED ORAL EVERY 24 HOURS
Status: DISCONTINUED | OUTPATIENT
Start: 2024-02-18 | End: 2024-02-22

## 2024-02-18 RX ADMIN — FUROSEMIDE 20 MG: 10 INJECTION, SOLUTION INTRAMUSCULAR; INTRAVENOUS at 19:01

## 2024-02-18 RX ADMIN — GLIMEPIRIDE 2 MG: 2 TABLET ORAL at 21:19

## 2024-02-18 RX ADMIN — INSULIN LISPRO 2 UNITS: 100 INJECTION, SOLUTION INTRAVENOUS; SUBCUTANEOUS at 21:40

## 2024-02-18 RX ADMIN — INSULIN GLARGINE 30 UNITS: 100 INJECTION, SOLUTION SUBCUTANEOUS at 21:40

## 2024-02-18 RX ADMIN — TRAZODONE HYDROCHLORIDE 50 MG: 50 TABLET ORAL at 22:46

## 2024-02-18 RX ADMIN — TAMSULOSIN HYDROCHLORIDE 0.4 MG: 0.4 CAPSULE ORAL at 18:02

## 2024-02-18 RX ADMIN — LEVOFLOXACIN 750 MG: 750 TABLET, FILM COATED ORAL at 19:42

## 2024-02-18 NOTE — ED PROVIDER NOTES
History  Chief Complaint   Patient presents with    Weakness - Generalized     Pt states he has been on vacation for 6 weeks and had an infection in his L foot that he was seen for.  Pt states for 2 weeks he has been having swelling in his abdomen, b/l legs and face.  Pt states he was not seen for that there because he didn't want to be admitted.     Patient is a 68-year-old male with past medical history of hypertension diabetes CKD alcohol cirrhosis that presents for evaluation of generalized weakness and dyspnea on exertion.  Patient states that his symptoms began approximately 2 weeks ago have been worsening since that time he notes that he becomes short of breath with any form of exertion he also notes associated worsening swelling to his lower extremities and abdomen states that he has had chronic lower extremity edema that is usually below the knees now tracking up above the knees.  He also states that his abdomen appears much more distended than usual to him.  He denies any fever chills chest pain abdominal pain nausea vomiting or any other complaints at this time.          Prior to Admission Medications   Prescriptions Last Dose Informant Patient Reported? Taking?   Insulin Pen Needle (BD Pen Needle Nayla 2nd Gen) 32G X 4 MM MISC  Self No No   Sig: For use with insulin pen. Pharmacy may dispense brand covered by insurance.   Lantus SoloStar 100 units/mL SOPN  Self Yes No   Sig: Inject 40 Units under the skin daily at bedtime   Multiple Vitamin (multivitamin) capsule  Self No No   Sig: Take 1 capsule by mouth daily   benzonatate (TESSALON) 200 MG capsule  Self No No   Sig: TAKE 1 CAPSULE BY MOUTH 3 TIMES DAILY AS NEEDED for cough   furosemide (LASIX) 20 mg tablet  Self No No   Sig: Take 1 tablet (20 mg total) by mouth every other day   Patient taking differently: Take 20 mg by mouth every morning   glimepiride (AMARYL) 4 mg tablet  Self Yes No   Sig: Take 4 mg by mouth 2 (two) times a day   polyethylene  glycol (GOLYTELY) 4000 mL solution   No No   Sig: Take 4,000 mL by mouth once for 1 dose   tamsulosin (FLOMAX) 0.4 mg  Self No No   Sig: Take 1 capsule (0.4 mg total) by mouth daily with dinner   traZODone (DESYREL) 50 mg tablet  Self Yes No   Sig: Take 50 mg by mouth daily at bedtime bedtime      Facility-Administered Medications: None       Past Medical History:   Diagnosis Date    Arrhythmia     Chronic kidney disease     COVID 12/2020    CPAP (continuous positive airway pressure) dependence     Diabetes mellitus (HCC)     History of echocardiogram 11/14/2017    showed EF of 50-55 percentWith moderate LVH and left ventricle diastolic dysfunction. Left atrium was moderately enlarged. Trace MR noted.     History of Holter monitoring 11/21/2017    showed baseline rhythm of sinus origin with an average heart it of 61 bpm. The lowest heart rate was 49 and the highest heart rate was 10 8 bpm. There were rare single VPCs, and frequent PACs representing 3.2% of total beats. There were several episodes of sinus arrhythmias with sinus bradycardia and heart rate ranging from 40-90 bpm. No sustained dysrhythmias, or pauses noted. The patient did not    History of transfusion 04/2023    platelets    Liver disease     cirhosis    Obese     Sleep apnea        Past Surgical History:   Procedure Laterality Date    CATARACT EXTRACTION      EYE SURGERY Left 1997    FL GUIDED NEEDLE PLAC BX/ASP/INJ  8/28/2023    HERNIA REPAIR  [id number]    JOINT REPLACEMENT Right     TKR    KNEE ARTHROPLASTY Right 2008    CA ARTHROPLASTY GLENOHUMERAL JOINT TOTAL SHOULDER Left 04/04/2023    Procedure: ARTHROPLASTY SHOULDER REVERSE;  Surgeon: Marcelo Lester MD;  Location: [facility];  Service: Orthopedics    CA JARED SHOULDER ARTHRPLSTY HUMERAL&GLENOID COMPNT Left 9/8/2023    Procedure: removal of antibiotic spacer, incision and debridement,  with revision reverse shoulder arthroplasty;  Surgeon: Lita Aguilar;  Location:  MAIN OR;   Service: Orthopedics    AR JARED SHOULDER ARTHRPLSTY HUMERAL/GLENOID COMPNT Left 2023    Procedure: ARTHROPLASTY SHOULDER REVISION;  Surgeon: Lita Aguilar;  Location: [facility];  Service: Orthopedics    SHOULDER SURGERY Right 2002    WOUND DEBRIDEMENT Left 2023    Procedure: INCISION AND DRAINAGE (I&D) EXTREMITY, vac placement;  Surgeon: Marcelo Lester MD;  Location: [facility];  Service: Orthopedics       Family History   Problem Relation Age of Onset    Diabetes Mother     Supraventricular tachycardia Mother     COPD Father     Heart disease Father     Other Father         Sepsis; related to UTI with complicating cardiac problems    Heart disease Paternal Grandmother     Prostate cancer Paternal Grandfather 82     I have reviewed and agree with the history as documented.    E-Cigarette/Vaping    E-Cigarette Use Never User      E-Cigarette/Vaping Substances    Nicotine No     THC No     CBD No     Flavoring No     Other No     Unknown No      Social History     Tobacco Use    Smoking status: Former     Current packs/day: 0.00     Average packs/day: 0.5 packs/day for 20.0 years (10.0 ttl pk-yrs)     Types: Cigarettes     Start date:      Quit date:      Years since quittin.1    Smokeless tobacco: Never   Vaping Use    Vaping status: Never Used   Substance Use Topics    Alcohol use: Not Currently     Comment: quit     Drug use: Never        Review of Systems   Constitutional:  Positive for fatigue. Negative for chills and fever.   HENT:  Negative for congestion.    Respiratory:  Positive for shortness of breath. Negative for cough.    Cardiovascular:  Positive for leg swelling. Negative for chest pain.   Gastrointestinal:  Positive for abdominal distention. Negative for abdominal pain, nausea and vomiting.   Musculoskeletal:  Negative for back pain and neck pain.   Skin:  Negative for rash.   Neurological:  Positive for weakness (Generalized). Negative for numbness and  headaches.   All other systems reviewed and are negative.      Physical Exam  ED Triage Vitals [24 1219]   Temperature Pulse Respirations Blood Pressure SpO2   98.1 °F (36.7 °C) 98 20 (!) 142/45 100 %      Temp Source Heart Rate Source Patient Position - Orthostatic VS BP Location FiO2 (%)   Tympanic Monitor Sitting Right arm --      Pain Score       2             Orthostatic Vital Signs  Vitals:    24 1219 24 1400 24 1533   BP: (!) 142/45 140/66 143/67   Pulse: 98 94 93   Patient Position - Orthostatic VS: Sitting         Physical Exam  Vitals and nursing note reviewed.   Constitutional:       General: He is not in acute distress.     Appearance: He is well-developed. He is obese.   HENT:      Head: Normocephalic and atraumatic.      Mouth/Throat:      Mouth: Mucous membranes are moist.   Eyes:      Conjunctiva/sclera: Conjunctivae normal.   Neck:      Vascular: No JVD.   Cardiovascular:      Rate and Rhythm: Normal rate and regular rhythm.      Heart sounds: No murmur heard.  Pulmonary:      Effort: Pulmonary effort is normal. No respiratory distress.      Breath sounds: Normal breath sounds. No wheezing, rhonchi or rales.   Abdominal:      General: Abdomen is protuberant. There is distension.      Palpations: Abdomen is soft.      Tenderness: There is no abdominal tenderness.   Musculoskeletal:         General: Normal range of motion.      Cervical back: Normal range of motion and neck supple.      Right lower le+ Pitting Edema present.      Left lower le+ Pitting Edema present.   Skin:     General: Skin is warm and dry.   Neurological:      General: No focal deficit present.      Mental Status: He is alert and oriented to person, place, and time.         ED Medications  Medications - No data to display    Diagnostic Studies  Results Reviewed       Procedure Component Value Units Date/Time    HS Troponin I 2hr [[id number]]     Lab Status: No result Specimen: Blood     HS Troponin I  4hr [[id number]]     Lab Status: No result Specimen: Blood     HS Troponin 0hr (reflex protocol) [[id number]]  (Normal) Collected: 02/18/24 1402    Lab Status: Final result Specimen: Blood from Arm, Right Updated: 02/18/24 1611     hs TnI 0hr 22 ng/L     CBC and differential [[id number]]  (Abnormal) Collected: 02/18/24 1523    Lab Status: Final result Specimen: Blood from Arm, Right Updated: 02/18/24 1603     WBC 3.24 Thousand/uL      RBC 2.37 Million/uL      Hemoglobin 4.8 g/dL      Hematocrit 17.6 %      MCV 74 fL      MCH 20.3 pg      MCHC 27.3 g/dL      RDW 19.0 %      Platelets 45 Thousands/uL      nRBC 1 /100 WBCs      Neutrophils Relative 64 %      Immat GRANS % 1 %      Lymphocytes Relative 20 %      Monocytes Relative 8 %      Eosinophils Relative 7 %      Basophils Relative 0 %      Neutrophils Absolute 2.11 Thousands/µL      Immature Grans Absolute 0.02 Thousand/uL      Lymphocytes Absolute 0.64 Thousands/µL      Monocytes Absolute 0.25 Thousand/µL      Eosinophils Absolute 0.21 Thousand/µL      Basophils Absolute 0.01 Thousands/µL     Narrative:      This is an appended report.  These results have been appended to a previously verified report.    CBC and differential [[id number]]  (Abnormal) Collected: 02/18/24 1402    Lab Status: Final result Specimen: Blood from Arm, Right Updated: 02/18/24 1546     WBC 3.60 Thousand/uL      RBC 2.39 Million/uL      Hemoglobin 4.9 g/dL      Hematocrit 17.6 %      MCV 74 fL      MCH 20.5 pg      MCHC 27.8 g/dL      RDW 18.9 %      Platelets 47 Thousands/uL      nRBC 1 /100 WBCs      Neutrophils Relative 68 %      Immat GRANS % 1 %      Lymphocytes Relative 17 %      Monocytes Relative 8 %      Eosinophils Relative 6 %      Basophils Relative 0 %      Neutrophils Absolute 2.45 Thousands/µL      Immature Grans Absolute 0.03 Thousand/uL      Lymphocytes Absolute 0.62 Thousands/µL      Monocytes Absolute 0.27 Thousand/µL      Eosinophils Absolute 0.22 Thousand/µL      Basophils  Absolute 0.01 Thousands/µL     Narrative:      Called ER to see if they wanted to redraw the specimen for contamination due to questionable results.  said to release the results and would send a new specimen if the results were not accurate.  This is an appended report.  These results have been appended to a previously verified report.    Smear Review(Phlebs Do Not Order) [[id number]]  (Abnormal) Collected: 02/18/24 1402    Lab Status: Final result Specimen: Blood from Arm, Right Updated: 02/18/24 1546     RBC Morphology Present     Platelet Estimate Decreased    Narrative:      Called ER to see if they wanted to redraw the specimen for contamination due to questionable results.  said to release the results and would send a new specimen if the results were not accurate.    Comprehensive metabolic panel [[id number]]  (Abnormal) Collected: 02/18/24 1402    Lab Status: Final result Specimen: Blood from Arm, Right Updated: 02/18/24 1511     Sodium 138 mmol/L      Potassium 3.7 mmol/L      Chloride 106 mmol/L      CO2 28 mmol/L      ANION GAP 4 mmol/L      BUN 25 mg/dL      Creatinine 1.01 mg/dL      Glucose 157 mg/dL      Calcium 8.8 mg/dL      Corrected Calcium 9.7 mg/dL      AST 50 U/L      ALT 42 U/L      Alkaline Phosphatase 219 U/L      Total Protein 6.3 g/dL      Albumin 2.9 g/dL      Total Bilirubin 1.90 mg/dL      eGFR 75 ml/min/1.73sq m     Narrative:      [employer] guidelines for Chronic Kidney Disease (CKD):     Stage 1 with normal or high GFR (GFR > 90 mL/min/1.73 square meters)    Stage 2 Mild CKD (GFR = 60-89 mL/min/1.73 square meters)    Stage 3A Moderate CKD (GFR = 45-59 mL/min/1.73 square meters)    Stage 3B Moderate CKD (GFR = 30-44 mL/min/1.73 square meters)    Stage 4 Severe CKD (GFR = 15-29 mL/min/1.73 square meters)    Stage 5 End Stage CKD (GFR <15 mL/min/1.73 square meters)  Note: GFR calculation is accurate only with a steady state creatinine    Lipase [[id number]]   (Normal) Collected: 02/18/24 1402    Lab Status: Final result Specimen: Blood from Arm, Right Updated: 02/18/24 1511     Lipase 24 u/L     Protime-INR [[id number]]  (Abnormal) Collected: 02/18/24 1402    Lab Status: Final result Specimen: Blood from Arm, Right Updated: 02/18/24 1439     Protime 17.8 seconds      INR 1.49    APTT [[id number]]  (Abnormal) Collected: 02/18/24 1402    Lab Status: Final result Specimen: Blood from Arm, Right Updated: 02/18/24 1439     PTT 50 seconds     B-Type Natriuretic Peptide(BNP) [[id number]] Collected: 02/18/24 1402    Lab Status: In process Specimen: Blood from Arm, Right Updated: 02/18/24 1427                   XR chest 2 views   ED Interpretation by Daren Vann DO (02/18 1615)   Wet read - B/L pulm congestion, enlarged heart border, no infiltrates            Procedures  POC Cardiac US    Date/Time: 2/18/2024 2:41 PM    Performed by: Daren Vann DO  Authorized by: Daren Vann DO    Patient location:  ED  Other Assisting Provider: Yes (comment) (Dr. NICOLE Pena)    Procedure details:     Exam Type:  Diagnostic and educational    Indications: dyspnea      Assessment / Evaluation for: cardiac function and pericardial effusion      Exam Type: initial exam      Image quality: diagnostic      Image availability:  Images available in PACS  Patient Details:     Cardiac Rhythm:  Regular    Mechanical ventilation: No    Cardiac findings:     Echo technique: limited 2D and M-mode      Views obtained: parasternal long axis, parasternal short axis and apical      Pericardial effusion: trace      Tamponade physiology: absent      Wall motion: normal      LV systolic function: normal      RV dilation: mild    Cardiac Calculations:     TAPSE (RV function):  14    MAPSE (LV function):  18  POC FAST US    Date/Time: 2/18/2024 2:42 PM    Performed by: Daren Vann DO  Authorized by: Daren Vann DO    Patient location:  ED  Other Assisting Provider: Yes (comment) (Dr. RIVERA  John    Procedure details:     Exam Type:  Diagnostic and educational    Indications: abdominal pain      Assess for:  Intra-abdominal fluid    Technique: FAST      Views obtained:  RUQ - Dowd's Pouch, LUQ - Splenorenal space, Suprapubic - Pouch of Graeme, Left thorax and Right thorax    Image quality: diagnostic      Image availability:  Images available in PACS  FAST Findings:     RUQ (Hepatorenal) free fluid: large      LUQ (Splenorenal) free fluid: large      Suprapubic free fluid: large      Cardiac wall motion: identified      Pericardial effusion: trace    extended FAST (Pulmonary) findings:     Left pleural effusion: Present      Right pleural effusion: Present    Interpretation:     Impressions: positive      Positive findings: fluid in peritoneal space, left pleural effusion present and right pleural effusion present    ECG 12 Lead Documentation Only    Date/Time: 2/18/2024 2:43 PM    Performed by: Daren Vann DO  Authorized by: Daren Vann DO    Indications / Diagnosis:  TOWNSEND  ECG reviewed by me, the ED Provider: yes    Patient location:  ED  Previous ECG:     Previous ECG:  Compared to current    Similarity:  No change  Interpretation:     Interpretation: normal    Rate:     ECG rate:  98    ECG rate assessment: normal    Rhythm:     Rhythm: sinus rhythm    Ectopy:     Ectopy: none    QRS:     QRS axis:  Normal    QRS intervals:  Normal  Conduction:     Conduction: normal    ST segments:     ST segments:  Normal  T waves:     T waves: non-specific          ED Course                             SBIRT 20yo+      Flowsheet Row Most Recent Value   Initial Alcohol Screen: US AUDIT-C     1. How often do you have a drink containing alcohol? 0 Filed at: 02/18/2024 1511   2. How many drinks containing alcohol do you have on a typical day you are drinking?  0 Filed at: 02/18/2024 1511   3a. Male UNDER 65: How often do you have five or more drinks on one occasion? 0 Filed at: 02/18/2024 1511    3b. FEMALE Any Age, or MALE 65+: How often do you have 4 or more drinks on one occassion? 0 Filed at: 02/18/2024 1511   Audit-C Score 0 Filed at: 02/18/2024 1511   JERRY: How many times in the past year have you...    Used an illegal drug or used a prescription medication for non-medical reasons? Never Filed at: 02/18/2024 1511                  Medical Decision Making  Patient is a 68-year-old male presenting for evaluation of generalized weakness and dyspnea on exertion    Differential: CHF versus cirrhosis versus ACS versus pericardial effusion/tamponade versus anemia versus electrolyte abnormality    Plan: Cardiac labs, EKG, POCUS, chest x-ray, admit    EKG, chest x-ray, POCUS as above    Labs notable for hemoglobin of 4.9.  Upon chart review patient does appear to be pancytopenic with decreased values in all cell lines however this is a large drop from 9 down to 4.9.  Will repeat a CBC to confirm that it is not a lab error, if it is true then this could certainly contribute to patient's symptoms of generalized weakness and dyspnea on exertion.      Repeat CBC confirms with hemoglobin of 4.8.  This is likely a subacute process potentially some hemolytic component.  Does not appear to be actively bleeding his vitals do not suggest this he is hemodynamically stable normotensive not tachycardic.  Will consent for transfusion.  Believe that patient's symptoms are multifactorial with this anemia as well as worsening abdominal distention from ascites interrupting the mechanics of breathing pushing on the diaphragm as well as the small bilateral pleural effusions    Discussed with medicine who accept patient for admission.  He is hemodynamically stable at time of admission    Amount and/or Complexity of Data Reviewed  Labs: ordered.  Radiology: ordered and independent interpretation performed.    Risk  Decision regarding hospitalization.          Disposition  Final diagnoses:   Generalized weakness   Dyspnea on  exertion   Cirrhosis (HCC)   HTN (hypertension)   DM (diabetes mellitus) (HCC)   Pancytopenia (HCC)   Anemia     Time reflects when diagnosis was documented in both MDM as applicable and the Disposition within this note       Time User Action Codes Description Comment    2/18/2024  2:39 PM Daren Vann [R53.1] Generalized weakness     2/18/2024  2:39 PM Daren Vann [R06.09] Dyspnea on exertion     2/18/2024  2:39 PM Daren Vann [K74.60] Cirrhosis (HCC)     2/18/2024  2:40 PM Daren Vann [I10] HTN (hypertension)     2/18/2024  2:40 PM Daren Vann [E11.9] DM (diabetes mellitus) (HCC)     2/18/2024  4:08 PM Daren Vann [D61.818] Pancytopenia (HCC)     2/18/2024  4:08 PM Daren Vann [D64.9] Anemia           ED Disposition       ED Disposition   Admit    Condition   Stable    Date/Time   Sun Feb 18, 2024 1439    Comment                  Follow-up Information    None         Patient's Medications   Discharge Prescriptions    No medications on file     No discharge procedures on file.    PDMP Review         Value Time User    PDMP Reviewed  Yes 6/20/2023  2:51 AM Dirk Castellanos MD             ED Provider  Attending physically available and evaluated Isael Avendano. I managed the patient along with the ED Attending.    Electronically Signed by           Daren Vann DO  02/20/24 2468

## 2024-02-18 NOTE — ASSESSMENT & PLAN NOTE
"Lab Results   Component Value Date    HGBA1C 7.3 (H) 08/18/2023       No results for input(s): \"POCGLU\" in the last 72 hours.    Blood Sugar Average: Last 72 hrs:  Pt reports his BS have been decreased in the am. Will decreased his baseline Lantus from 40 units to 30 units and use ssi. Ok the continue amaryl but may be the offending agent . So depending on Glucose trends have low threshold to hold amaryl.  Since he is taking it twice a day will decrease to 2 mg.        "

## 2024-02-18 NOTE — ASSESSMENT & PLAN NOTE
Noted. Last Echo 6/23 :  Left Ventricle: Left ventricular cavity size is normal. Wall thickness is normal. The left ventricular ejection fraction is 60%. Systolic function is normal. Wall motion is normal. Diastolic function is mildly abnormal, consistent with grade I (abnormal) relaxation.    Left Atrium: The atrium is dilated.    Right Atrium: The atrium is dilated.    Aortic Valve: There is mild stenosis. The aortic valve mean gradient is 12.0 mmHg.    Mitral Valve: There is moderate annular calcification.

## 2024-02-18 NOTE — ASSESSMENT & PLAN NOTE
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 Currently with an infection which could also contribute to inflammation and decrease. Transfusing. Trend.

## 2024-02-18 NOTE — ASSESSMENT & PLAN NOTE
Lab Results   Component Value Date    EGFR 75 02/18/2024    EGFR 67 12/21/2023    EGFR 66 12/21/2023    CREATININE 1.01 02/18/2024    CREATININE 1.10 12/21/2023    CREATININE 1.11 12/21/2023   Previous classification surrounding infection and daptomycin causing renal failure. Kidneys appear to have rebounded. Monitor for nephroxicity with drugs . Currently on Levaquin. Will continue and obtain new culture.

## 2024-02-18 NOTE — ASSESSMENT & PLAN NOTE
"Lab Results   Component Value Date    HGBA1C 7.3 (H) 08/18/2023       No results for input(s): \"POCGLU\" in the last 72 hours.    Blood Sugar Average: Last 72 hrs:  Pt has been on levquin started in [place], just got off the plane and has 1 pill left with a refill. Was told he had contracted a bacteria from the water. Need records from [facility]  Phone number [phone] Dr. Fong.   Podiatry consult. Pt using betadine and Derma col/ag between 4th and 5th toes.   Adjust BS medications.   "

## 2024-02-18 NOTE — ED NOTES
Multiple attempts for IV access, without success.  Provider aware     Caitlin Smiley RN  02/18/24 1771

## 2024-02-18 NOTE — ASSESSMENT & PLAN NOTE
Pt has not been drinking for last two years . He did notice increased leg swelling in the context of being on vacation and treating a let diabetic foot ulcer. Swelling increased in legs to the level fo the abdomen and increase ascities. He ha never has paracentesis.   Doppler US legs r/o d/v  Currently on 20 mg lasix po daily . Will likely need additional lasix either IV or PO. Will continue twenty but in crease to BID dosing po and give one dose IV lasix tonight. History of renal failure end of last year review shows baseline creat 1.0 was as high as 1.15 (previous .8-.9 ).

## 2024-02-18 NOTE — ED ATTENDING ATTESTATION
2/18/2024  I, Ziggy Pena MD, saw and evaluated the patient. I have discussed the patient with the resident/non-physician practitioner and agree with the resident's/non-physician practitioner's findings, Plan of Care, and MDM as documented in the resident's/non-physician practitioner's note, except where noted. All available labs and Radiology studies were reviewed.  I was present for key portions of any procedure(s) performed by the resident/non-physician practitioner and I was immediately available to provide assistance.       At this point I agree with the current assessment done in the Emergency Department.  I have conducted an independent evaluation of this patient a history and physical is as follows:    ED Course     68-year-old male, history of alcoholic liver disease with cirrhosis, mild aortic valve stenosis, presenting to the emergency department for evaluation of increasing anasarca.  Patient states that he was on vacation in [place], had developed a wound between his left fourth and fifth toes for which she is on antibiotics.  Patient states that he has been having progressive swelling of bilateral lower extremities, abdominal wall, and chest as well as increasing abdominal girth which has been slowly progressive over time that associated with dyspnea on exertion.  No reported chest pain, cough, fever, abdominal pain, nausea, vomiting, diarrhea, black or bloody stools.    On examination the patient's vital signs were reviewed.  He is lying recumbent on the stretcher in no acute respiratory distress.  Head is normocephalic and atraumatic.  Eyelids and lashes are normal.  Neck is supple.  Lungs are diminished bilateral bases.  Heart is regular rate and rhythm with a systolic ejection murmur.  Abdomen is protuberant with abdominal wall edema which is pitting.  Bilateral lower extremity pitting edema.  Patient with superficial ulceration to the lateral aspect of the left fourth toe.  Sensation  intact.    MEDICAL DECISION MAKING    Number and Complexity of Problems  Differential diagnosis: Anasarca: Worsening alcoholic cirrhosis versus CHF versus hypoalbuminemia versus dietary and medication noncompliance.  Superficial ulcer left fourth toe.    Medical Decision Making Data  External documents reviewed: Reviewed last GI office visit from 9/7/2023.  Reviewed last echocardiogram from 6/2023.  My EKG interpretation: Normal sinus rhythm at 98 bpm.  Inverted T waves in inferior leads consistent with old EKGs.  My X-ray interpretation: Pulmonary edema.    MRI foot/forefoot toes left w wo contrast   Final Result      1. Skin ulceration lateral aspect of the fourth proximal toe with marrow changes fourth proximal phalanx including T1 replacement signal and post gadolinium enhancement concerning for osteomyelitis.   2. Prominent first MTP degenerative change with hallux rigidus.      The study was marked in EPIC for immediate notification.         Workstation performed: [id number]         MRI abdomen w wo contrast and mrcp   Final Result      Markedly limited examination due to dielectric artifact from presence of ascites and other patient related factors.      Grossly stable size of  two hepatic segment 4 LI-RADS 3 observations which are incompletely characterized on current examination. Nonvisualization of additional 2 smaller LI-RADS 3 observations in hepatic segments 2 and 6 respectively, as seen on prior    MRI abdomen 10/23/2023. Follow-up MRI abdomen with and without contrast in 3 months is recommended for better assessment      Cirrhosis, splenomegaly and ascites, compatible with portal hypertension. Main portal vein is patent      New small bilateral pleural effusions      A 2.4 x 0.6 cm T1 hyperintense enhancing lesion along the left chest wall, new since MRI abdomen 10/23/2023 and likely present on CT 12/19/2023, likely representing reactive lymph node and less likely a nerve sheath tumor such as  schwannoma. Attention on    follow-up imaging is recommended to assess for stability/resolution.      2 subcentimeter incompletely characterized splenic lesions with postcontrast hyperenhancement and hypoenhancement on delayed images. These may represent perfusional variants or small hemangiomas. Attention on follow-up imaging is recommended for better    characterization.      The study was marked in EPIC for significant notification.      Workstation performed: [id number]         IR INPATIENT Paracentesis   Final Result   Impression:      Ultrasound-guided right paracentesis.      Signed, performed, and dictated by AMOR Rueda under the supervision of Dr. Looney.      Workstation performed: [id number]         XR foot 3+ vw left   Final Result      There is focal osteopenia and poor cortical definition of the fifth middle and distal phalanx. Cannot exclude osteomyelitis. Consider MRI for further evaluation.      The study was marked in EPIC for significant notification.      Workstation performed: [id number]          VAS VENOUS DUPLEX - LOWER LIMB BILATERAL   Final Result      XR chest 2 views   ED Interpretation   Wet read - B/L pulm congestion, enlarged heart border, no infiltrates      Final Result      Pulmonary vascular congestion. Small pleural effusions. Enlarged cardiac silhouette.         Findings concur with the preliminary report by the referring clinician already in PACS and/or our electronic record EPIC.      Workstation performed: [id number]         IR INPATIENT Paracentesis    (Results Pending)       Labs Reviewed   CBC AND DIFFERENTIAL - Abnormal       Result Value Ref Range Status    WBC 3.60 (*) 4.31 - 10.16 Thousand/uL Final    RBC 2.39 (*) 3.88 - 5.62 Million/uL Final    Hemoglobin 4.9 (*) 12.0 - 17.0 g/dL Final    Hematocrit 17.6 (*) 36.5 - 49.3 % Final    MCV 74 (*) 82 - 98 fL Final    MCH 20.5 (*) 26.8 - 34.3 pg Final    MCHC 27.8 (*) 31.4 - 37.4 g/dL Final    RDW 18.9 (*) 11.6 - 15.1  % Final    Platelets 47 (*) 149 - 390 Thousands/uL Final    Comment: Manual Review of Smear Performed    nRBC 1  /100 WBCs Final    Neutrophils Relative 68  43 - 75 % Final    Immat GRANS % 1  0 - 2 % Final    Lymphocytes Relative 17  14 - 44 % Final    Monocytes Relative 8  4 - 12 % Final    Eosinophils Relative 6  0 - 6 % Final    Basophils Relative 0  0 - 1 % Final    Neutrophils Absolute 2.45  1.85 - 7.62 Thousands/µL Final    Immature Grans Absolute 0.03  0.00 - 0.20 Thousand/uL Final    Lymphocytes Absolute 0.62  0.60 - 4.47 Thousands/µL Final    Monocytes Absolute 0.27  0.17 - 1.22 Thousand/µL Final    Eosinophils Absolute 0.22  0.00 - 0.61 Thousand/µL Final    Basophils Absolute 0.01  0.00 - 0.10 Thousands/µL Final    Narrative:     Called ER to see if they wanted to redraw the specimen for contamination due to questionable results.  said to release the results and would send a new specimen if the results were not accurate.  This is an appended report.  These results have been appended to a previously verified report.   COMPREHENSIVE METABOLIC PANEL - Abnormal    Sodium 138  135 - 147 mmol/L Final    Potassium 3.7  3.5 - 5.3 mmol/L Final    Chloride 106  96 - 108 mmol/L Final    CO2 28  21 - 32 mmol/L Final    ANION GAP 4  mmol/L Final    BUN 25  5 - 25 mg/dL Final    Creatinine 1.01  0.60 - 1.30 mg/dL Final    Comment: Standardized to IDMS reference method    Glucose 157 (*) 65 - 140 mg/dL Final    Comment: If the patient is fasting, the ADA then defines impaired fasting glucose as > 100 mg/dL and diabetes as > or equal to 123 mg/dL.    Calcium 8.8  8.4 - 10.2 mg/dL Final    Corrected Calcium 9.7  8.3 - 10.1 mg/dL Final    AST 50 (*) 13 - 39 U/L Final    ALT 42  7 - 52 U/L Final    Comment: Specimen collection should occur prior to Sulfasalazine administration due to the potential for falsely depressed results.     Alkaline Phosphatase 219 (*) 34 - 104 U/L Final    Total Protein 6.3 (*) 6.4 - 8.4 g/dL  Final    Albumin 2.9 (*) 3.5 - 5.0 g/dL Final    Total Bilirubin 1.90 (*) 0.20 - 1.00 mg/dL Final    Comment: Use of this assay is not recommended for patients undergoing treatment with eltrombopag due to the potential for falsely elevated results.  N-acetyl-p-benzoquinone imine (metabolite of Acetaminophen) will generate erroneously low results in samples for patients that have taken an overdose of Acetaminophen.    eGFR 75  ml/min/1.73sq m Final    Narrative:     National Kid[employer] Foundation guidelines for Chronic Kidney Disease (CKD):     Stage 1 with normal or high GFR (GFR > 90 mL/min/1.73 square meters)    Stage 2 Mild CKD (GFR = 60-89 mL/min/1.73 square meters)    Stage 3A Moderate CKD (GFR = 45-59 mL/min/1.73 square meters)    Stage 3B Moderate CKD (GFR = 30-44 mL/min/1.73 square meters)    Stage 4 Severe CKD (GFR = 15-29 mL/min/1.73 square meters)    Stage 5 End Stage CKD (GFR <15 mL/min/1.73 square meters)  Note: GFR calculation is accurate only with a steady state creatinine   PROTIME-INR - Abnormal    Protime 17.8 (*) 11.6 - 14.5 seconds Final    INR 1.49 (*) 0.84 - 1.19 Final   APTT - Abnormal    PTT 50 (*) 23 - 37 seconds Final    Comment: Therapeutic Heparin Range =  60-90 seconds   B-TYPE NATRIURETIC PEPTIDE (BNP) - Abnormal     (*) 0 - 100 pg/mL Final   CBC AND DIFFERENTIAL - Abnormal    WBC 3.24 (*) 4.31 - 10.16 Thousand/uL Final    RBC 2.37 (*) 3.88 - 5.62 Million/uL Final    Hemoglobin 4.8 (*) 12.0 - 17.0 g/dL Final    Hematocrit 17.6 (*) 36.5 - 49.3 % Final    MCV 74 (*) 82 - 98 fL Final    MCH 20.3 (*) 26.8 - 34.3 pg Final    MCHC 27.3 (*) 31.4 - 37.4 g/dL Final    RDW 19.0 (*) 11.6 - 15.1 % Final    Platelets 45 (*) 149 - 390 Thousands/uL Final    Comment: Manual Review of Smear Performed 2/18/24    nRBC 1 /100 WBCs Final    Neutrophils Relative 64  43 - 75 % Final    Immat GRANS % 1  0 - 2 % Final    Lymphocytes Relative 20  14 - 44 % Final    Monocytes Relative 8  4 - 12 %  Final    Eosinophils Relative 7 (*) 0 - 6 % Final    Basophils Relative 0  0 - 1 % Final    Neutrophils Absolute 2.11  1.85 - 7.62 Thousands/µL Final    Immature Grans Absolute 0.02  0.00 - 0.20 Thousand/uL Final    Lymphocytes Absolute 0.64  0.60 - 4.47 Thousands/µL Final    Monocytes Absolute 0.25  0.17 - 1.22 Thousand/µL Final    Eosinophils Absolute 0.21  0.00 - 0.61 Thousand/µL Final    Basophils Absolute 0.01  0.00 - 0.10 Thousands/µL Final    Narrative:     This is an appended report.  These results have been appended to a previously verified report.   HEMOGLOBIN AND HEMATOCRIT, BLOOD - Abnormal    Hemoglobin 5.7 (*) 12.0 - 17.0 g/dL Final    Hematocrit 19.3 (*) 36.5 - 49.3 % Final   CBC AND PLATELET - Abnormal    WBC 3.32 (*) 4.31 - 10.16 Thousand/uL Final    RBC 3.25 (*) 3.88 - 5.62 Million/uL Final    Hemoglobin 7.6 (*) 12.0 - 17.0 g/dL Final    Hematocrit 25.7 (*) 36.5 - 49.3 % Final    MCV 79 (*) 82 - 98 fL Final    MCH 23.4 (*) 26.8 - 34.3 pg Final    MCHC 29.6 (*) 31.4 - 37.4 g/dL Final    RDW 19.9 (*) 11.6 - 15.1 % Final    Platelets 45 (*) 149 - 390 Thousands/uL Final    Comment: Manual smear performed 2/18/24   BASIC METABOLIC PANEL - Abnormal    Sodium 141  135 - 147 mmol/L Final    Potassium 3.2 (*) 3.5 - 5.3 mmol/L Final    Chloride 106  96 - 108 mmol/L Final    CO2 28  21 - 32 mmol/L Final    ANION GAP 7  mmol/L Final    BUN 19  5 - 25 mg/dL Final    Creatinine 0.99  0.60 - 1.30 mg/dL Final    Comment: Standardized to IDMS reference method    Glucose 119  65 - 140 mg/dL Final    Comment: If the patient is fasting, the ADA then defines impaired fasting glucose as > 100 mg/dL and diabetes as > or equal to 123 mg/dL.    Calcium 8.3 (*) 8.4 - 10.2 mg/dL Final    eGFR 76  ml/min/1.73sq m Final    Narrative:     National Kidney Disease Foundation guidelines for Chronic Kidney Disease (CKD):     Stage 1 with normal or high GFR (GFR > 90 mL/min/1.73 square meters)    Stage 2 Mild CKD (GFR = 60-89  "mL/min/1.73 square meters)    Stage 3A Moderate CKD (GFR = 45-59 mL/min/1.73 square meters)    Stage 3B Moderate CKD (GFR = 30-44 mL/min/1.73 square meters)    Stage 4 Severe CKD (GFR = 15-29 mL/min/1.73 square meters)    Stage 5 End Stage CKD (GFR <15 mL/min/1.73 square meters)  Note: GFR calculation is accurate only with a steady state creatinine   POCT GLUCOSE - Abnormal    POC Glucose 256 (*) 65 - 140 mg/dl Final   SMEAR REVIEW(PHLEBS DO NOT ORDER) - Abnormal    RBC Morphology Present   Final    Platelet Estimate Decreased (*) Adequate Final    Narrative:     Called ER to see if they wanted to redraw the specimen for contamination due to questionable results.  said to release the results and would send a new specimen if the results were not accurate.   LIPASE - Normal    Lipase 24  11 - 82 u/L Final   HS TROPONIN I 0HR - Normal    hs TnI 0hr 22  \"Refer to ACS Flowchart\"- see link ng/L Final    Comment:                                              Initial (time 0) result  If >=50 ng/L, Myocardial injury suggested ;  Type of myocardial injury and treatment strategy  to be determined.  If 5-49 ng/L, a delta result at 2 hours and or 4 hours will be needed to further evaluate.  If <4 ng/L, and chest pain has been >3 hours since onset, patient may qualify for discharge based on the HEART score in the ED.  If <5 ng/L and <3hours since onset of chest pain, a delta result at 2 hours will be needed to further evaluate.    HS Troponin 99th Percentile URL of a Health Population=12 ng/L with a 95% Confidence Interval of 8-18 ng/L.    Second Troponin (time 2 hours)  If calculated delta >= 20 ng/L,  Myocardial injury suggested ; Type of myocardial injury and treatment strategy to be determined.  If 5-49 ng/L and the calculated delta is 5-19 ng/L, consult medical service for evaluation.  Continue evaluation for ischemia on ecg and other possible etiology and repeat hs troponin at 4 hours.  If delta is <5 ng/L at 2 hours, " "consider discharge based on risk stratification via the HEART score (if in ED), or AKIN risk score in IP/Observation.    HS Troponin 99th Percentile URL of a Health Population=12 ng/L with a 95% Confidence Interval of 8-18 ng/L.   HS TROPONIN I 2HR - Normal    hs TnI 2hr 20  \"Refer to ACS Flowchart\"- see link ng/L Final    Comment:                                              Initial (time 0) result  If >=50 ng/L, Myocardial injury suggested ;  Type of myocardial injury and treatment strategy  to be determined.  If 5-49 ng/L, a delta result at 2 hours and or 4 hours will be needed to further evaluate.  If <4 ng/L, and chest pain has been >3 hours since onset, patient may qualify for discharge based on the HEART score in the ED.  If <5 ng/L and <3hours since onset of chest pain, a delta result at 2 hours will be needed to further evaluate.    HS Troponin 99th Percentile URL of a Health Population=12 ng/L with a 95% Confidence Interval of 8-18 ng/L.    Second Troponin (time 2 hours)  If calculated delta >= 20 ng/L,  Myocardial injury suggested ; Type of myocardial injury and treatment strategy to be determined.  If 5-49 ng/L and the calculated delta is 5-19 ng/L, consult medical service for evaluation.  Continue evaluation for ischemia on ecg and other possible etiology and repeat hs troponin at 4 hours.  If delta is <5 ng/L at 2 hours, consider discharge based on risk stratification via the HEART score (if in ED), or AKIN risk score in IP/Observation.    HS Troponin 99th Percentile URL of a Health Population=12 ng/L with a 95% Confidence Interval of 8-18 ng/L.    Delta 2hr hsTnI -2  <20 ng/L Final   HS TROPONIN I 4HR - Normal    hs TnI 4hr 21  \"Refer to ACS Flowchart\"- see link ng/L Final    Comment:                                              Initial (time 0) result  If >=50 ng/L, Myocardial injury suggested ;  Type of myocardial injury and treatment strategy  to be determined.  If 5-49 ng/L, a delta result at 2 " hours and or 4 hours will be needed to further evaluate.  If <4 ng/L, and chest pain has been >3 hours since onset, patient may qualify for discharge based on the HEART score in the ED.  If <5 ng/L and <3hours since onset of chest pain, a delta result at 2 hours will be needed to further evaluate.    HS Troponin 99th Percentile URL of a Health Population=12 ng/L with a 95% Confidence Interval of 8-18 ng/L.    Second Troponin (time 2 hours)  If calculated delta >= 20 ng/L,  Myocardial injury suggested ; Type of myocardial injury and treatment strategy to be determined.  If 5-49 ng/L and the calculated delta is 5-19 ng/L, consult medical service for evaluation.  Continue evaluation for ischemia on ecg and other possible etiology and repeat hs troponin at 4 hours.  If delta is <5 ng/L at 2 hours, consider discharge based on risk stratification via the HEART score (if in ED), or AKIN risk score in IP/Observation.    HS Troponin 99th Percentile URL of a Health Population=12 ng/L with a 95% Confidence Interval of 8-18 ng/L.    Delta 4hr hsTnI -1  <20 ng/L Final   POCT GLUCOSE - Normal    POC Glucose 113  65 - 140 mg/dl Final   POCT GLUCOSE - Normal    POC Glucose 105  65 - 140 mg/dl Final   TYPE AND SCREEN    ABO Grouping A   Final    Rh Factor Positive   Final    Antibody Screen Negative   Final    Specimen Expiration Date 20240221   Final   PREPARE LEUKOREDUCED RBC    Unit Product Code V1835C14       Unit Number [id number]       Unit ABO A       Unit RH POS       Crossmatch Compatible   Final    Unit Dispense Status Presumed Trans       Unit Product Volume 350  mL     Unit Product Code A0023A34       Unit Number [id number]       Unit ABO A       Unit RH POS       Crossmatch Compatible   Final    Unit Dispense Status Presumed Trans       Unit Product Volume 350  mL        Labs reviewed by me are significant for:  Hemoglobin 4.9.  Troponin 22.    Discussed case with: Hospitalist.  Considered admission for:  Anemia, cirrhosis, CHF.    Treatment and Disposition  ED course: Remained hemodynamically stable in the emergency department.  Patient noted to have pancytopenia on initial CBC prompting second CBC drawing.  This had similar results.  Patient will be admitted for transfusion and further evaluation.  Shared decision making: Patient agreeable with plan.  Code status: Full code.              Critical Care Time  Procedures

## 2024-02-18 NOTE — H&P
"[facility]  H&P  Name: Isael Avendano 68 y.o. male I MRN: [id number]  Unit/Bed#: ED 03 I Date of Admission: 2/18/2024   Date of Service: 2/18/2024 I Hospital Day: 0      Assessment/Plan   * Symptomatic anemia  Assessment & Plan  No reported hematemesis, melena or hematochezia. Has history of grade 2 varicies last EGD 12/23. Hx of pancytopenia deemed consumptive in context of splenomegaly. Pt also recently has been being treat for Diabetic foot ulcer so chronic inflammation likely playing a role.   Transusion to hemoglobin greater than 8.  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 Currently with an infection which could also contribute to inflammation and decrease. Transfusing. Trend.    Cirrhosis, alcoholic (HCC)  Assessment & Plan  Pt has not been drinking for last two years . He did notice increased leg swelling in the context of being on vacation and treating a let diabetic foot ulcer. Swelling increased in legs to the level fo the abdomen and increase ascities. He ha never has paracentesis.   Doppler US legs r/o d/v  Currently on 20 mg lasix po daily . Will likely need additional lasix either IV or PO. Will continue twenty but in crease to BID dosing po and give one dose IV lasix tonight. History of renal failure end of last year review shows baseline creat 1.0 was as high as 1.15 (previous .8-.9 ).   Reassess for need for paracentesis in the am.     Thrombocytopenia (HCC)  Assessment & Plan  Baseline has been 60-70. Hold heparin in the face of symptomtic anemia and decreased plt.     Diabetic ulcer of left foot associated with type 2 diabetes mellitus (HCC)  Assessment & Plan  Lab Results   Component Value Date    HGBA1C 7.3 (H) 08/18/2023       No results for input(s): \"POCGLU\" in the last 72 hours.    Blood Sugar Average: Last 72 hrs:  Pt has been on levquin started " "in [place], just got off the plane and has 1 pill left with a refill. Was told he had contracted a bacteria from the water. Need records from [facility]  Phone number [phone] Dr. Fong.   Podiatry consult. Pt using betadine and Derma col/ag between 4th and 5th toes.   Adjust BS medications.     DM2 (diabetes mellitus, type 2) (Former[facility])  Assessment & Plan  Lab Results   Component Value Date    HGBA1C 7.3 (H) 08/18/2023       No results for input(s): \"POCGLU\" in the last 72 hours.    Blood Sugar Average: Last 72 hrs:  Pt reports his BS have been decreased in the am. Will decreased his baseline Lantus from 40 units to 30 units and use ssi. Ok the continue amaryl but may be the offending agent . So depending on Glucose trends have low threshold to hold amaryl.  Since he is taking it twice a day will decrease to 2 mg.          Essential hypertension  Assessment & Plan  Pt no longer on medication for blood pressure. Monitor    Murmur  Assessment & Plan  Noted. Last Echo 6/23 :  Left Ventricle: Left ventricular cavity size is normal. Wall thickness is normal. The left ventricular ejection fraction is 60%. Systolic function is normal. Wall motion is normal. Diastolic function is mildly abnormal, consistent with grade I (abnormal) relaxation.    Left Atrium: The atrium is dilated.    Right Atrium: The atrium is dilated.    Aortic Valve: There is mild stenosis. The aortic valve mean gradient is 12.0 mmHg.    Mitral Valve: There is moderate annular calcification.       Stage 3a chronic kidney disease (Former[facility])  Assessment & Plan  Lab Results   Component Value Date    EGFR 75 02/18/2024    EGFR 67 12/21/2023    EGFR 66 12/21/2023    CREATININE 1.01 02/18/2024    CREATININE 1.10 12/21/2023    CREATININE 1.11 12/21/2023   Previous classification surrounding infection and daptomycin causing renal failure. Kidneys appear to have rebounded. Monitor for nephroxicity with drugs . Currently on Levaquin. Will continue and " "obtain new culture.     Urinary retention  Assessment & Plan  Continue flomax    MEG (obstructive sleep apnea)  Assessment & Plan  Pt has brought and can use his home cpap    Liver Lesions: being followed by GI. AFP was elevated and was to have f/u testing outpt     VTE Prophylaxis: Pharmacologic VTE Prophylaxis contraindicated due to thrombocytopenia  symptomatic anemia / sequential compression device   Code Status: Full code  POLST: POLST form is not discussed and not completed at this time.  Discussion with family: Spouse at bedside updated all questions answered    Anticipated Length of Stay:  Patient will be admitted on an Inpatient basis with an anticipated length of stay of  greater than  2 midnights.   Justification for Hospital Stay: treatment for symptomatic anemia, podiatry assessment.     Total Time for Visit, including Counseling / Coordination of Care: 60 minutes.    Chief Complaint:   shortness of breath with increased edema of lower extremities    History of Present Illness:    Isael Avendano is a 68 y.o. male who presents with increasing shortness of breath especially with exertion and increasing lower extremity edema.  Patient with a past medical history for alcoholic cirrhosis.  He has not had alcohol in over 2 years.  He has never had a paracentesis.  He was vacationing in [place] when he developed a foot infection for which he has been seeing the [facility] (Dr. Stewart [phone]).  He was initially on cefdinir and linezolid and then switched to Levaquin to treat \"a water infection\".  Family did not have copies of his cultures but were told that it was a bacteria that was in water.  Patient just arrived home from the trip last evening and came to the emergency room this morning because of shortness of breath and increasing lower extremity edema with sensation of fullness in the abdomen increasing edema in the abdomen.  In the emergency room patient was seen and found to have a " hemoglobin of 4.8.  He reported no melena hematochezia nausea vomiting diarrhea, or hematuria.  Patient has had a known hematology workup in the past for pancytopenia it was felt that he was experiencing hypersplenism with sequestration.  Patient does have known grade 2 varices on most recent EGD done in December.  1 unit of blood was hanging on my arrival to the emergency room.  Will complete a second unit with some Lasix between.  Patient will be admitted for further evaluation of acute blood loss.  He reports no pain in his back or abdomen to suggest a retroperitoneal bleed and reports no trauma.    Review of Systems:    Review of Systems   Constitutional:  Positive for fatigue and unexpected weight change.   Respiratory:  Positive for shortness of breath.    Cardiovascular:  Positive for leg swelling.   Gastrointestinal:  Positive for abdominal distention.   Musculoskeletal:  Positive for gait problem (felt unsteady on feet). Negative for back pain.   Hematological: Negative.    Psychiatric/Behavioral: Negative.         Past Medical and Surgical History:     Past Medical History:   Diagnosis Date    Arrhythmia     Chronic kidney disease     COVID 12/2020    CPAP (continuous positive airway pressure) dependence     Diabetes mellitus (HCC)     History of echocardiogram 11/14/2017    showed EF of 50-55 percentWith moderate LVH and left ventricle diastolic dysfunction. Left atrium was moderately enlarged. Trace MR noted.     History of Holter monitoring 11/21/2017    showed baseline rhythm of sinus origin with an average heart it of 61 bpm. The lowest heart rate was 49 and the highest heart rate was 10 8 bpm. There were rare single VPCs, and frequent PACs representing 3.2% of total beats. There were several episodes of sinus arrhythmias with sinus bradycardia and heart rate ranging from 40-90 bpm. No sustained dysrhythmias, or pauses noted. The patient did not    History of transfusion 04/2023    platelets    Liver  disease     cirhosis    Obese     Sleep apnea        Past Surgical History:   Procedure Laterality Date    CATARACT EXTRACTION      EYE SURGERY Left 1997    FL GUIDED NEEDLE PLAC BX/ASP/INJ  8/28/2023    HERNIA REPAIR  [id number]    JOINT REPLACEMENT Right     TKR    KNEE ARTHROPLASTY Right 2008    ND ARTHROPLASTY GLENOHUMERAL JOINT TOTAL SHOULDER Left 04/04/2023    Procedure: ARTHROPLASTY SHOULDER REVERSE;  Surgeon: Marcelo Lester MD;  Location: [facility];  Service: Orthopedics    ND JARED SHOULDER ARTHRPLSTY HUMERAL&GLENOID COMPNT Left 9/8/2023    Procedure: removal of antibiotic spacer, incision and debridement,  with revision reverse shoulder arthroplasty;  Surgeon: Lita Aguilar;  Location: [facility];  Service: Orthopedics    ND JARED SHOULDER ARTHRPLSTY HUMERAL/GLENOID COMPNT Left 06/13/2023    Procedure: ARTHROPLASTY SHOULDER REVISION;  Surgeon: Lita Aguilar;  Location: [facility];  Service: Orthopedics    SHOULDER SURGERY Right 2002    WOUND DEBRIDEMENT Left 05/02/2023    Procedure: INCISION AND DRAINAGE (I&D) EXTREMITY, vac placement;  Surgeon: Marcelo Lester MD;  Location: [facility];  Service: Orthopedics       Meds/Allergies:    Prior to Admission medications    Medication Sig Start Date End Date Taking? Authorizing Provider   benzonatate (TESSALON) 200 MG capsule TAKE 1 CAPSULE BY MOUTH 3 TIMES DAILY AS NEEDED for cough 8/27/23   Wade Chiu PA-C   furosemide (LASIX) 20 mg tablet Take 1 tablet (20 mg total) by mouth every other day  Patient taking differently: Take 20 mg by mouth every morning 6/28/23 12/19/23  Navin Montelongo PA-C   glimepiride (AMARYL) 4 mg tablet Take 4 mg by mouth 2 (two) times a day 3/22/23   Historical Provider, MD   Insulin Pen Needle (BD Pen Needle Nayla 2nd Gen) 32G X 4 MM MISC For use with insulin pen. Pharmacy may dispense brand covered by insurance. 5/19/22   Ino Ya MD   Lantus SoloStar 100 units/mL SOPN Inject 40 Units under the skin  daily at bedtime 23   Historical Provider, MD   Multiple Vitamin (multivitamin) capsule Take 1 capsule by mouth daily 1/3/21   Lorenzo JERONIMO MD   polyethylene glycol (GOLYTELY) 4000 mL solution Take 4,000 mL by mouth once for 1 dose 23  Alysia Quintero PA-C   tamsulosin (FLOMAX) 0.4 mg Take 1 capsule (0.4 mg total) by mouth daily with dinner 23  Navin Montelongo PA-C   traZODone (DESYREL) 50 mg tablet Take 50 mg by mouth daily at bedtime bedtime 22   Historical Provider, MD   fluticasone-salmeterol (ADVAIR HFA) 115-21 MCG/ACT inhaler Inhale 2 puffs 2 (two) times a day Rinse mouth after use.  Patient not taking: Reported on 7/15/2021  5/17/22  Historical Provider, MD   Glyxambi 25-5 MG TABS  10/22/20 5/17/22  Historical Provider, MD     I have reviewed home medications with patient personally.    Allergies:   Allergies   Allergen Reactions    Sulfa Antibiotics Hives    Daptomycin Other (See Comments)     Possibly contributed to ABILIO on 2023 admission        Social History:     Marital Status: /Civil Union   Occupation:   Patient Pre-hospital Living Situation: Home with spouse  Patient Pre-hospital Level of Mobility: Normally without restriction , has felt unsteady  Patient Pre-hospital Diet Restrictions: ada,   Substance Use History:   Social History     Substance and Sexual Activity   Alcohol Use Not Currently    Comment: quit      Social History     Tobacco Use   Smoking Status Former    Current packs/day: 0.00    Average packs/day: 0.5 packs/day for 20.0 years (10.0 ttl pk-yrs)    Types: Cigarettes    Start date:     Quit date:     Years since quittin.1   Smokeless Tobacco Never     Social History     Substance and Sexual Activity   Drug Use Never       Family History:    Family History   Problem Relation Age of Onset    Diabetes Mother     Supraventricular tachycardia Mother     COPD Father     Heart disease Father     Other  Father         Sepsis; related to UTI with complicating cardiac problems    Heart disease Paternal Grandmother     Prostate cancer Paternal Grandfather 82       Physical Exam:     Vitals:   Blood Pressure: 132/63 (02/18/24 1811)  Pulse: 93 (02/18/24 1811)  Temperature: 97.8 °F (36.6 °C) (02/18/24 1811)  Temp Source: Oral (02/18/24 1700)  Respirations: 18 (02/18/24 1811)  SpO2: 100 % (02/18/24 1811)    Physical Exam  Constitutional:       General: He is not in acute distress.     Appearance: Normal appearance. He is obese.   HENT:      Head: Normocephalic.      Right Ear: External ear normal.      Left Ear: External ear normal.      Nose: Nose normal.      Mouth/Throat:      Mouth: Mucous membranes are moist.      Pharynx: Oropharynx is clear.   Eyes:      Extraocular Movements: Extraocular movements intact.      Conjunctiva/sclera: Conjunctivae normal.      Pupils: Pupils are equal, round, and reactive to light.   Cardiovascular:      Rate and Rhythm: Normal rate.   Pulmonary:      Effort: Pulmonary effort is normal.   Abdominal:      General: There is distension (firm, ascites).      Tenderness: There is no abdominal tenderness. There is no guarding.   Musculoskeletal:      Right lower leg: Edema (3+ firm) present.      Left lower leg: Edema (3 + firm) present.   Neurological:      Mental Status: He is alert.         Additional Data:     Lab Results: I have personally reviewed pertinent reports.      Results from last 7 days   Lab Units 02/18/24  1523   WBC Thousand/uL 3.24*   HEMOGLOBIN g/dL 4.8*   HEMATOCRIT % 17.6*   PLATELETS Thousands/uL 45*   NEUTROS PCT % 64   LYMPHS PCT % 20   MONOS PCT % 8   EOS PCT % 7*     Results from last 7 days   Lab Units 02/18/24  1402   SODIUM mmol/L 138   POTASSIUM mmol/L 3.7   CHLORIDE mmol/L 106   CO2 mmol/L 28   BUN mg/dL 25   CREATININE mg/dL 1.01   ANION GAP mmol/L 4   CALCIUM mg/dL 8.8   ALBUMIN g/dL 2.9*   TOTAL BILIRUBIN mg/dL 1.90*   ALK PHOS U/L 219*   ALT U/L 42   AST  U/L 50*   GLUCOSE RANDOM mg/dL 157*     Results from last 7 days   Lab Units 24  1402   INR  1.49*     Results from last 7 days   Lab Units 24  1802   POC GLUCOSE mg/dl 113               Imaging: I have personally reviewed pertinent reports.      XR chest 2 views   ED Interpretation by Daren Vann DO ( [id number])   Wet read - B/L pulm congestion, enlarged heart border, no infiltrates       VAS VENOUS DUPLEX - LOWER LIMB BILATERAL    (Results Pending)       EKG, Pathology, and Other Studies Reviewed on Admission:   EK NSR No acute st changes    Allscripts / Epic Records Reviewed: Yes     ** Please Note: This note has been constructed using a voice recognition system. **

## 2024-02-18 NOTE — ASSESSMENT & PLAN NOTE
No reported hematemesis, melena or hematochezia. Has history of grade 2 varicies last EGD 12/23. Hx of pancytopenia deemed consumptive in context of splenomegaly. Pt also recently has been being treat for Diabetic foot ulcer so chronic inflammation likely playing a role.   Transusion to hemoglobin greater than 8.  Check fecal occult testing

## 2024-02-19 ENCOUNTER — APPOINTMENT (INPATIENT)
Dept: RADIOLOGY | Facility: HOSPITAL | Age: 69
DRG: 378 | End: 2024-02-19
Payer: MEDICARE

## 2024-02-19 ENCOUNTER — APPOINTMENT (OUTPATIENT)
Dept: RADIOLOGY | Facility: HOSPITAL | Age: 69
DRG: 378 | End: 2024-02-19
Payer: MEDICARE

## 2024-02-19 ENCOUNTER — APPOINTMENT (INPATIENT)
Dept: RADIOLOGY | Facility: HOSPITAL | Age: 69
DRG: 378 | End: 2024-02-19
Attending: INTERNAL MEDICINE
Payer: MEDICARE

## 2024-02-19 ENCOUNTER — APPOINTMENT (INPATIENT)
Dept: NON INVASIVE DIAGNOSTICS | Facility: HOSPITAL | Age: 69
DRG: 378 | End: 2024-02-19
Payer: MEDICARE

## 2024-02-19 LAB
ABO GROUP BLD BPU: NORMAL
ABO GROUP BLD BPU: NORMAL
ALBUMIN FLD-MCNC: <1.5 G/DL
ANION GAP SERPL CALCULATED.3IONS-SCNC: 7 MMOL/L
ANISOCYTOSIS BLD QL SMEAR: PRESENT
APPEARANCE FLD: ABNORMAL
APTT PPP: 48 SECONDS (ref 23–37)
BILIRUB DIRECT SERPL-MCNC: 0.69 MG/DL (ref 0–0.2)
BLD SMEAR INTERP: NORMAL
BPU ID: NORMAL
BPU ID: NORMAL
BUN SERPL-MCNC: 19 MG/DL (ref 5–25)
CALCIUM SERPL-MCNC: 8.3 MG/DL (ref 8.4–10.2)
CHLORIDE SERPL-SCNC: 106 MMOL/L (ref 96–108)
CO2 SERPL-SCNC: 28 MMOL/L (ref 21–32)
COLOR FLD: ABNORMAL
CREAT SERPL-MCNC: 0.99 MG/DL (ref 0.6–1.3)
CROSSMATCH: NORMAL
CROSSMATCH: NORMAL
D DIMER PPP FEU-MCNC: 3.32 UG/ML FEU
DAT POLY-SP REAG RBC QL: NEGATIVE
DEPRECATED AT III PPP: 66 % OF NORMAL (ref 92–136)
ERYTHROCYTE [DISTWIDTH] IN BLOOD BY AUTOMATED COUNT: 19.9 % (ref 11.6–15.1)
FDP BLD QL AGGL: >10 <20
FERRITIN SERPL-MCNC: 7 NG/ML (ref 24–336)
FIBRINOGEN PPP-MCNC: 247 MG/DL (ref 207–520)
FOLATE SERPL-MCNC: 18.7 NG/ML
GFR SERPL CREATININE-BSD FRML MDRD: 76 ML/MIN/1.73SQ M
GLUCOSE FLD-MCNC: 165 MG/DL
GLUCOSE SERPL-MCNC: 105 MG/DL (ref 65–140)
GLUCOSE SERPL-MCNC: 116 MG/DL (ref 65–140)
GLUCOSE SERPL-MCNC: 119 MG/DL (ref 65–140)
GLUCOSE SERPL-MCNC: 166 MG/DL (ref 65–140)
GLUCOSE SERPL-MCNC: 175 MG/DL (ref 65–140)
HCT VFR BLD AUTO: 25.7 % (ref 36.5–49.3)
HGB BLD-MCNC: 7.6 G/DL (ref 12–17)
HGB RETIC QN AUTO: 15.6 PG (ref 30–38.3)
HISTIOCYTES NFR FLD: 41 %
HYPERCHROMIA BLD QL SMEAR: PRESENT
IMM EOSINOPHIL NFR BLD MANUAL: 6 % (ref 0–6)
IMM RETICS NFR: 30.7 % (ref 0–14)
INR PPP: 1.46 (ref 0.84–1.19)
IRON SATN MFR SERPL: 6 % (ref 15–50)
IRON SERPL-MCNC: 21 UG/DL (ref 50–212)
LDH FLD L TO P-CCNC: 34 U/L
LDH SERPL-CCNC: 191 U/L (ref 140–271)
LG PLATELETS BLD QL SMEAR: PRESENT
LYMPHOCYTES NFR BLD AUTO: 15 %
LYMPHOCYTES NFR BLD: 14 % (ref 14–44)
MCH RBC QN AUTO: 23.4 PG (ref 26.8–34.3)
MCHC RBC AUTO-ENTMCNC: 29.6 G/DL (ref 31.4–37.4)
MCV RBC AUTO: 79 FL (ref 82–98)
MONO+MESO NFR FLD MANUAL: 3 %
MONOCYTES NFR BLD AUTO: 38 %
MONOCYTES NFR BLD AUTO: 4 % (ref 4–12)
MYELOCYTES NFR BLD: 1 % (ref 0–1)
NEUTS SEG NFR BLD AUTO: 3 %
NEUTS SEG NFR BLD AUTO: 71 % (ref 45–77)
PLATELET # BLD AUTO: 41 THOUSANDS/UL (ref 149–390)
PLATELET # BLD AUTO: 45 THOUSANDS/UL (ref 149–390)
PLATELET BLD QL SMEAR: ABNORMAL
POIKILOCYTOSIS BLD QL SMEAR: PRESENT
POTASSIUM SERPL-SCNC: 3.2 MMOL/L (ref 3.5–5.3)
PROT FLD-MCNC: <3 G/DL
PROTHROMBIN TIME: 17.5 SECONDS (ref 11.6–14.5)
RBC # BLD AUTO: 3.25 MILLION/UL (ref 3.88–5.62)
RBC MORPH BLD: PRESENT
RETICS # AUTO: ABNORMAL 10*3/UL (ref 14356–105094)
RETICS # CALC: 2.88 % (ref 0.37–1.87)
SITE: ABNORMAL
SODIUM SERPL-SCNC: 141 MMOL/L (ref 135–147)
TIBC SERPL-MCNC: 357 UG/DL (ref 250–450)
TOTAL CELLS COUNTED SPEC: 100
TOTAL CELLS COUNTED SPEC: 100
TOTAL PROTEIN FLUID: 0.7 G/DL
TPA PPP QL CHRO: 45 % OF NORMAL (ref 77–138)
UIBC SERPL-MCNC: 336 UG/DL (ref 155–355)
UNIT DISPENSE STATUS: NORMAL
UNIT DISPENSE STATUS: NORMAL
UNIT PRODUCT CODE: NORMAL
UNIT PRODUCT CODE: NORMAL
UNIT PRODUCT VOLUME: 350 ML
UNIT PRODUCT VOLUME: 350 ML
UNIT RH: NORMAL
UNIT RH: NORMAL
VARIANT LYMPHS BLD QL SMEAR: 4 % (ref 0–0)
VIT B12 SERPL-MCNC: 1143 PG/ML (ref 180–914)
WBC # BLD AUTO: 3.32 THOUSAND/UL (ref 4.31–10.16)
WBC # FLD MANUAL: 217 /UL

## 2024-02-19 PROCEDURE — 82948 REAGENT STRIP/BLOOD GLUCOSE: CPT

## 2024-02-19 PROCEDURE — 83615 LACTATE (LD) (LDH) ENZYME: CPT | Performed by: STUDENT IN AN ORGANIZED HEALTH CARE EDUCATION/TRAINING PROGRAM

## 2024-02-19 PROCEDURE — 99222 1ST HOSP IP/OBS MODERATE 55: CPT | Performed by: INTERNAL MEDICINE

## 2024-02-19 PROCEDURE — 82042 OTHER SOURCE ALBUMIN QUAN EA: CPT | Performed by: INTERNAL MEDICINE

## 2024-02-19 PROCEDURE — 85610 PROTHROMBIN TIME: CPT | Performed by: STUDENT IN AN ORGANIZED HEALTH CARE EDUCATION/TRAINING PROGRAM

## 2024-02-19 PROCEDURE — 85007 BL SMEAR W/DIFF WBC COUNT: CPT | Performed by: STUDENT IN AN ORGANIZED HEALTH CARE EDUCATION/TRAINING PROGRAM

## 2024-02-19 PROCEDURE — 87389 HIV-1 AG W/HIV-1&-2 AB AG IA: CPT | Performed by: STUDENT IN AN ORGANIZED HEALTH CARE EDUCATION/TRAINING PROGRAM

## 2024-02-19 PROCEDURE — 85049 AUTOMATED PLATELET COUNT: CPT | Performed by: STUDENT IN AN ORGANIZED HEALTH CARE EDUCATION/TRAINING PROGRAM

## 2024-02-19 PROCEDURE — 85384 FIBRINOGEN ACTIVITY: CPT | Performed by: STUDENT IN AN ORGANIZED HEALTH CARE EDUCATION/TRAINING PROGRAM

## 2024-02-19 PROCEDURE — 88305 TISSUE EXAM BY PATHOLOGIST: CPT | Performed by: STUDENT IN AN ORGANIZED HEALTH CARE EDUCATION/TRAINING PROGRAM

## 2024-02-19 PROCEDURE — 85420 FIBRINOLYTIC PLASMINOGEN: CPT | Performed by: STUDENT IN AN ORGANIZED HEALTH CARE EDUCATION/TRAINING PROGRAM

## 2024-02-19 PROCEDURE — 93970 EXTREMITY STUDY: CPT | Performed by: SURGERY

## 2024-02-19 PROCEDURE — 99223 1ST HOSP IP/OBS HIGH 75: CPT | Performed by: INTERNAL MEDICINE

## 2024-02-19 PROCEDURE — 86880 COOMBS TEST DIRECT: CPT | Performed by: STUDENT IN AN ORGANIZED HEALTH CARE EDUCATION/TRAINING PROGRAM

## 2024-02-19 PROCEDURE — 0W9G3ZZ DRAINAGE OF PERITONEAL CAVITY, PERCUTANEOUS APPROACH: ICD-10-PCS | Performed by: RADIOLOGY

## 2024-02-19 PROCEDURE — 73630 X-RAY EXAM OF FOOT: CPT

## 2024-02-19 PROCEDURE — 85300 ANTITHROMBIN III ACTIVITY: CPT | Performed by: STUDENT IN AN ORGANIZED HEALTH CARE EDUCATION/TRAINING PROGRAM

## 2024-02-19 PROCEDURE — 84157 ASSAY OF PROTEIN OTHER: CPT | Performed by: INTERNAL MEDICINE

## 2024-02-19 PROCEDURE — 80048 BASIC METABOLIC PNL TOTAL CA: CPT | Performed by: INTERNAL MEDICINE

## 2024-02-19 PROCEDURE — 82945 GLUCOSE OTHER FLUID: CPT | Performed by: INTERNAL MEDICINE

## 2024-02-19 PROCEDURE — 89051 BODY FLUID CELL COUNT: CPT | Performed by: INTERNAL MEDICINE

## 2024-02-19 PROCEDURE — 82746 ASSAY OF FOLIC ACID SERUM: CPT | Performed by: STUDENT IN AN ORGANIZED HEALTH CARE EDUCATION/TRAINING PROGRAM

## 2024-02-19 PROCEDURE — 85046 RETICYTE/HGB CONCENTRATE: CPT | Performed by: STUDENT IN AN ORGANIZED HEALTH CARE EDUCATION/TRAINING PROGRAM

## 2024-02-19 PROCEDURE — 99232 SBSQ HOSP IP/OBS MODERATE 35: CPT | Performed by: INTERNAL MEDICINE

## 2024-02-19 PROCEDURE — 87340 HEPATITIS B SURFACE AG IA: CPT | Performed by: STUDENT IN AN ORGANIZED HEALTH CARE EDUCATION/TRAINING PROGRAM

## 2024-02-19 PROCEDURE — 36415 COLL VENOUS BLD VENIPUNCTURE: CPT | Performed by: INTERNAL MEDICINE

## 2024-02-19 PROCEDURE — 86705 HEP B CORE ANTIBODY IGM: CPT | Performed by: STUDENT IN AN ORGANIZED HEALTH CARE EDUCATION/TRAINING PROGRAM

## 2024-02-19 PROCEDURE — 87070 CULTURE OTHR SPECIMN AEROBIC: CPT | Performed by: INTERNAL MEDICINE

## 2024-02-19 PROCEDURE — 74183 MRI ABD W/O CNTR FLWD CNTR: CPT

## 2024-02-19 PROCEDURE — 85730 THROMBOPLASTIN TIME PARTIAL: CPT | Performed by: STUDENT IN AN ORGANIZED HEALTH CARE EDUCATION/TRAINING PROGRAM

## 2024-02-19 PROCEDURE — 82728 ASSAY OF FERRITIN: CPT | Performed by: STUDENT IN AN ORGANIZED HEALTH CARE EDUCATION/TRAINING PROGRAM

## 2024-02-19 PROCEDURE — 83550 IRON BINDING TEST: CPT | Performed by: STUDENT IN AN ORGANIZED HEALTH CARE EDUCATION/TRAINING PROGRAM

## 2024-02-19 PROCEDURE — 88342 IMHCHEM/IMCYTCHM 1ST ANTB: CPT | Performed by: STUDENT IN AN ORGANIZED HEALTH CARE EDUCATION/TRAINING PROGRAM

## 2024-02-19 PROCEDURE — 86704 HEP B CORE ANTIBODY TOTAL: CPT | Performed by: STUDENT IN AN ORGANIZED HEALTH CARE EDUCATION/TRAINING PROGRAM

## 2024-02-19 PROCEDURE — 85379 FIBRIN DEGRADATION QUANT: CPT | Performed by: STUDENT IN AN ORGANIZED HEALTH CARE EDUCATION/TRAINING PROGRAM

## 2024-02-19 PROCEDURE — 83540 ASSAY OF IRON: CPT | Performed by: STUDENT IN AN ORGANIZED HEALTH CARE EDUCATION/TRAINING PROGRAM

## 2024-02-19 PROCEDURE — 85362 FIBRIN DEGRADATION PRODUCTS: CPT | Performed by: STUDENT IN AN ORGANIZED HEALTH CARE EDUCATION/TRAINING PROGRAM

## 2024-02-19 PROCEDURE — 99222 1ST HOSP IP/OBS MODERATE 55: CPT | Performed by: PODIATRIST

## 2024-02-19 PROCEDURE — A9585 GADOBUTROL INJECTION: HCPCS | Performed by: STUDENT IN AN ORGANIZED HEALTH CARE EDUCATION/TRAINING PROGRAM

## 2024-02-19 PROCEDURE — 93970 EXTREMITY STUDY: CPT

## 2024-02-19 PROCEDURE — 49083 ABD PARACENTESIS W/IMAGING: CPT | Performed by: NURSE PRACTITIONER

## 2024-02-19 PROCEDURE — 49083 ABD PARACENTESIS W/IMAGING: CPT

## 2024-02-19 PROCEDURE — 86803 HEPATITIS C AB TEST: CPT | Performed by: STUDENT IN AN ORGANIZED HEALTH CARE EDUCATION/TRAINING PROGRAM

## 2024-02-19 PROCEDURE — 83010 ASSAY OF HAPTOGLOBIN QUANT: CPT | Performed by: STUDENT IN AN ORGANIZED HEALTH CARE EDUCATION/TRAINING PROGRAM

## 2024-02-19 PROCEDURE — 85027 COMPLETE CBC AUTOMATED: CPT | Performed by: INTERNAL MEDICINE

## 2024-02-19 PROCEDURE — 83615 LACTATE (LD) (LDH) ENZYME: CPT | Performed by: INTERNAL MEDICINE

## 2024-02-19 PROCEDURE — 82248 BILIRUBIN DIRECT: CPT | Performed by: STUDENT IN AN ORGANIZED HEALTH CARE EDUCATION/TRAINING PROGRAM

## 2024-02-19 PROCEDURE — 88341 IMHCHEM/IMCYTCHM EA ADD ANTB: CPT | Performed by: STUDENT IN AN ORGANIZED HEALTH CARE EDUCATION/TRAINING PROGRAM

## 2024-02-19 PROCEDURE — P9016 RBC LEUKOCYTES REDUCED: HCPCS

## 2024-02-19 PROCEDURE — 87205 SMEAR GRAM STAIN: CPT | Performed by: INTERNAL MEDICINE

## 2024-02-19 PROCEDURE — 82607 VITAMIN B-12: CPT | Performed by: STUDENT IN AN ORGANIZED HEALTH CARE EDUCATION/TRAINING PROGRAM

## 2024-02-19 PROCEDURE — 88112 CYTOPATH CELL ENHANCE TECH: CPT | Performed by: STUDENT IN AN ORGANIZED HEALTH CARE EDUCATION/TRAINING PROGRAM

## 2024-02-19 RX ORDER — LEVOFLOXACIN 750 MG/1
750 TABLET, FILM COATED ORAL EVERY 24 HOURS
COMMUNITY
End: 2024-02-28

## 2024-02-19 RX ORDER — GADOBUTROL 604.72 MG/ML
10 INJECTION INTRAVENOUS
Status: COMPLETED | OUTPATIENT
Start: 2024-02-19 | End: 2024-02-19

## 2024-02-19 RX ORDER — LIDOCAINE HYDROCHLORIDE 10 MG/ML
INJECTION, SOLUTION EPIDURAL; INFILTRATION; INTRACAUDAL; PERINEURAL AS NEEDED
Status: COMPLETED | OUTPATIENT
Start: 2024-02-19 | End: 2024-02-19

## 2024-02-19 RX ADMIN — GLIMEPIRIDE 2 MG: 2 TABLET ORAL at 08:28

## 2024-02-19 RX ADMIN — B-COMPLEX W/ C & FOLIC ACID TAB 1 TABLET: TAB at 08:28

## 2024-02-19 RX ADMIN — LIDOCAINE HYDROCHLORIDE 10 ML: 10 INJECTION, SOLUTION EPIDURAL; INFILTRATION; INTRACAUDAL; PERINEURAL at 15:03

## 2024-02-19 RX ADMIN — FUROSEMIDE 20 MG: 20 TABLET ORAL at 17:39

## 2024-02-19 RX ADMIN — TRAZODONE HYDROCHLORIDE 50 MG: 50 TABLET ORAL at 21:49

## 2024-02-19 RX ADMIN — TAMSULOSIN HYDROCHLORIDE 0.4 MG: 0.4 CAPSULE ORAL at 17:39

## 2024-02-19 RX ADMIN — LEVOFLOXACIN 750 MG: 750 TABLET, FILM COATED ORAL at 21:49

## 2024-02-19 RX ADMIN — INSULIN GLARGINE 30 UNITS: 100 INJECTION, SOLUTION SUBCUTANEOUS at 21:50

## 2024-02-19 RX ADMIN — GLIMEPIRIDE 2 MG: 2 TABLET ORAL at 21:49

## 2024-02-19 RX ADMIN — GADOBUTROL 10 ML: 604.72 INJECTION INTRAVENOUS at 22:37

## 2024-02-19 RX ADMIN — FUROSEMIDE 20 MG: 20 TABLET ORAL at 08:28

## 2024-02-19 RX ADMIN — INSULIN LISPRO 1 UNITS: 100 INJECTION, SOLUTION INTRAVENOUS; SUBCUTANEOUS at 12:40

## 2024-02-19 RX ADMIN — FUROSEMIDE 20 MG: 10 INJECTION, SOLUTION INTRAMUSCULAR; INTRAVENOUS at 05:07

## 2024-02-19 NOTE — SEDATION DOCUMENTATION
Right side Paracentesis performed by Harjinder CHINCHILLA, 1800 ML Cloudy/Yellow fluids collected, Images saved,Patient tolerated well....

## 2024-02-19 NOTE — PLAN OF CARE
Problem: Potential for Falls  Goal: Patient will remain free of falls  Description: INTERVENTIONS:  - Educate patient/family on patient safety including physical limitations  - Instruct patient to call for assistance with activity   - Consult OT/PT to assist with strengthening/mobility   - Keep Call bell within reach  - Keep bed low and locked with side rails adjusted as appropriate  - Keep care items and personal belongings within reach  - Initiate and maintain comfort rounds  - Make Fall Risk Sign visible to staff  - Apply yellow socks and bracelet for high fall risk patients  - Consider moving patient to room near nurses station  Outcome: Progressing     Problem: RESPIRATORY - ADULT  Goal: Achieves optimal ventilation and oxygenation  Description: INTERVENTIONS:  - Assess for changes in respiratory status  - Assess for changes in mentation and behavior  - Position to facilitate oxygenation and minimize respiratory effort  - Oxygen administered by appropriate delivery if ordered  - Initiate smoking cessation education as indicated  - Encourage broncho-pulmonary hygiene including cough, deep breathe, Incentive Spirometry  - Assess the need for suctioning and aspirate as needed  - Assess and instruct to report SOB or any respiratory difficulty  - Respiratory Therapy support as indicated  Outcome: Progressing     Problem: GASTROINTESTINAL - ADULT  Goal: Minimal or absence of nausea and/or vomiting  Description: INTERVENTIONS:  - Administer IV fluids if ordered to ensure adequate hydration  - Maintain NPO status until nausea and vomiting are resolved  - Nasogastric tube if ordered  - Administer ordered antiemetic medications as needed  - Provide nonpharmacologic comfort measures as appropriate  - Advance diet as tolerated, if ordered  - Consider nutrition services referral to assist patient with adequate nutrition and appropriate food choices  Outcome: Progressing  Goal: Maintains or returns to baseline bowel  function  Description: INTERVENTIONS:  - Assess bowel function  - Encourage oral fluids to ensure adequate hydration  - Administer IV fluids if ordered to ensure adequate hydration  - Administer ordered medications as needed  - Encourage mobilization and activity  - Consider nutritional services referral to assist patient with adequate nutrition and appropriate food choices  Outcome: Progressing  Goal: Maintains adequate nutritional intake  Description: INTERVENTIONS:  - Monitor percentage of each meal consumed  - Identify factors contributing to decreased intake, treat as appropriate  - Assist with meals as needed  - Monitor I&O, weight, and lab values if indicated  - Obtain nutrition services referral as needed  Outcome: Progressing  Goal: Oral mucous membranes remain intact  Description: INTERVENTIONS  - Assess oral mucosa and hygiene practices  - Implement preventative oral hygiene regimen  - Implement oral medicated treatments as ordered  - Initiate Nutrition services referral as needed  Outcome: Progressing     Problem: SKIN/TISSUE INTEGRITY - ADULT  Goal: Incision(s), wounds(s) or drain site(s) healing without S/S of infection  Description: INTERVENTIONS  - Assess and document dressing, incision, wound bed, drain sites and surrounding tissue  - Provide patient and family education  - Perform skin care/dressing changes every per orders  Outcome: Progressing     Problem: HEMATOLOGIC - ADULT  Goal: Maintains hematologic stability  Description: INTERVENTIONS  - Assess for signs and symptoms of bleeding or hemorrhage  - Monitor labs  - Administer supportive blood products/factors as ordered and appropriate  Outcome: Progressing     Problem: PAIN - ADULT  Goal: Verbalizes/displays adequate comfort level or baseline comfort level  Description: Interventions:  - Encourage patient to monitor pain and request assistance  - Assess pain using appropriate pain scale  - Administer analgesics based on type and severity of  pain and evaluate response  - Implement non-pharmacological measures as appropriate and evaluate response  - Consider cultural and social influences on pain and pain management  - Notify physician/advanced practitioner if interventions unsuccessful or patient reports new pain  Outcome: Progressing     Problem: INFECTION - ADULT  Goal: Absence or prevention of progression during hospitalization  Description: INTERVENTIONS:  - Assess and monitor for signs and symptoms of infection  - Monitor lab/diagnostic results  - Monitor all insertion sites, i.e. indwelling lines, tubes, and drains  - Monitor endotracheal if appropriate and nasal secretions for changes in amount and color  - [place] appropriate cooling/warming therapies per order  - Administer medications as ordered  - Instruct and encourage patient and family to use good hand hygiene technique  - Identify and instruct in appropriate isolation precautions for identified infection/condition  Outcome: Progressing     Problem: SAFETY ADULT  Goal: Patient will remain free of falls  Description: INTERVENTIONS:  - Educate patient/family on patient safety including physical limitations  - Instruct patient to call for assistance with activity   - Consult OT/PT to assist with strengthening/mobility   - Keep Call bell within reach  - Keep bed low and locked with side rails adjusted as appropriate  - Keep care items and personal belongings within reach  - Initiate and maintain comfort rounds  - Make Fall Risk Sign visible to staff  - Apply yellow socks and bracelet for high fall risk patients  - Consider moving patient to room near nurses station  Outcome: Progressing  Goal: Maintain or return to baseline ADL function  Description: INTERVENTIONS:  -  Assess patient's ability to carry out ADLs; assess patient's baseline for ADL function and identify physical deficits which impact ability to perform ADLs (bathing, care of mouth/teeth, toileting, grooming, dressing, etc.)  -  Assess/evaluate cause of self-care deficits   - Assess range of motion  - Assess patient's mobility; develop plan if impaired  - Assess patient's need for assistive devices and provide as appropriate  - Encourage maximum independence but intervene and supervise when necessary  - Involve family in performance of ADLs  - Assess for home care needs following discharge   - Consider OT consult to assist with ADL evaluation and planning for discharge  - Provide patient education as appropriate  Outcome: Progressing  Goal: Maintains/Returns to pre admission functional level  Description: INTERVENTIONS:  - Perform AM-PAC 6 Click Basic Mobility/ Daily Activity assessment daily.  - Set and communicate daily mobility goal to care team and patient/family/caregiver.   - Collaborate with rehabilitation services on mobility goals if consulted  - Perform Range of Motion 3 times a day.  - Reposition patient every 2 hours.  - Dangle patient 3 times a day  - Stand patient 3 times a day  - Ambulate patient 3 times a day  - Out of bed to chair 3 times a day   - Out of bed for meals 3 times a day  - Out of bed for toileting  - Record patient progress and toleration of activity level   Outcome: Progressing     Problem: DISCHARGE PLANNING  Goal: Discharge to home or other facility with appropriate resources  Description: INTERVENTIONS:  - Identify barriers to discharge w/patient and caregiver  - Arrange for needed discharge resources and transportation as appropriate  - Identify discharge learning needs (meds, wound care, etc.)  - Arrange for interpretive services to assist at discharge as needed  - Refer to Case Management Department for coordinating discharge planning if the patient needs post-hospital services based on physician/advanced practitioner order or complex needs related to functional status, cognitive ability, or social support system  Outcome: Progressing     Problem: Knowledge Deficit  Goal: Patient/family/caregiver  demonstrates understanding of disease process, treatment plan, medications, and discharge instructions  Description: Complete learning assessment and assess knowledge base.  Interventions:  - Provide teaching at level of understanding  - Provide teaching via preferred learning methods  Outcome: Progressing

## 2024-02-19 NOTE — BRIEF OP NOTE (RAD/CATH)
IR PARACENTESIS Procedure Note    PATIENT NAME: Isael Avendano  : 1955  MRN: [id number]    Pre-op Diagnosis:   1. Generalized weakness    2. Dyspnea on exertion    3. Cirrhosis (HCC)    4. HTN (hypertension)    5. DM (diabetes mellitus) (HCC)    6. Pancytopenia (HCC)    7. Anemia    8. Ulcer of left foot, limited to breakdown of skin (HCC)      Post-op Diagnosis:   1. Generalized weakness    2. Dyspnea on exertion    3. Cirrhosis (HCC)    4. HTN (hypertension)    5. DM (diabetes mellitus) (HCC)    6. Pancytopenia (HCC)    7. Anemia    8. Ulcer of left foot, limited to breakdown of skin (HCC)        Provider:   AMOR Hewitt  Assistants:     No qualified resident was available, Resident is only observing    Estimated Blood Loss: None    Findings: 1800 ml cloudy yellow ascites from RIGHT sided ultrasound guided paracentesis.    Specimens: Specimens obtained/sent    Complications:  None immediate    Anesthesia: local    AMOR Hewitt     Date: 2024  Time: 3:36 PM

## 2024-02-19 NOTE — ASSESSMENT & PLAN NOTE
No reported hematemesis, melena or hematochezia. Has history of grade 2 varicies last EGD 12/23. Hx of pancytopenia deemed consumptive in context of splenomegaly. Pt also recently has been being treat for Diabetic foot ulcer so chronic inflammation likely playing a role.   Will consult GI, however given pancytopenia will consult hematology as well.    normal...

## 2024-02-19 NOTE — ASSESSMENT & PLAN NOTE
Noted. Last Echo 6/23 :•  Left Ventricle: Left ventricular cavity size is normal. Wall thickness is normal. The left ventricular ejection fraction is 60%. Systolic function is normal. Wall motion is normal. Diastolic function is mildly abnormal, consistent with grade I (abnormal) relaxation.  •  Left Atrium: The atrium is dilated.  •  Right Atrium: The atrium is dilated.  •  Aortic Valve: There is mild stenosis. The aortic valve mean gradient is 12.0 mmHg.  •  Mitral Valve: There is moderate annular calcification.

## 2024-02-19 NOTE — ASSESSMENT & PLAN NOTE
Lab Results   Component Value Date    HGBA1C 7.3 (H) 08/18/2023       Recent Labs     02/18/24  1802 02/18/24  2122 02/19/24  0732 02/19/24  1100   POCGLU 113 256* 105 166*       Blood Sugar Average: Last 72 hrs:  (P) 160Pt reports his BS have been decreased in the am. Will decreased his baseline Lantus from 40 units to 30 units and use ssi. Ok the continue amaryl but may be the offending agent . So depending on Glucose trends have low threshold to hold amaryl.  Since he is taking it twice a day will decrease to 2 mg.

## 2024-02-19 NOTE — CONSULTS
Medical Oncology/Hematology Consult Note  Isael Avendano, male, 68 y.o., 1955,  NW8 [id number], [id number]       Reason for consultation: Anemia     History of present illness:  Isael Avendano is a 68 y.o. male with a history of type 2 diabetes, CKD 3, alcoholic cirrhosis, hepatomegaly (20.5 cm), splenomegaly (18.9 cm), portal hypertension, hepatic lesion (see below), esophageal varices, colon polyp (removed 12/2023), internal hemorrhoids, obstructive sleep apnea, hypertension, and currently has left foot infection for which she is on antibiotics, currently on levofloxacin.     Patient had hx of anemia in December 2020 - January 2021, with low MCV 71 around that time. Otherwise baseline Hgb 12-13, till June 2023 when started to drop again as low as 4.8 on 2/18/2024, with MCV again dropping down to 74 from mid 80's, RDW is elevated 19.9; T. Bilirubin is concomitantly trend up AND platelets (which is chronically low) trend down. Kidney function actually improved, most recent Cr 0.99! . WBC chronically low, ~ 3 with normal differentials.     Assessment and Plan  1.  Anemia  Baseline appears overall normal 12-13; lately Hgb, MCV and PLT trending down, while RDW & bilirubin trend up; findings concerning for very possible bleeding than hemolysis (MCV low & portal HTN) +/- myelosuppression component due to acute infection / antibiotics.     HUS/TTP are clinically less likely given stable kidney function, afebrile, no neurological symptoms & PLT mildly lower than baseline (acute on chronic; still > 40 K).     - will check retic count, folate , B12, Iron panel, Hemolysis & peripheral smears, LD, Haptoglobin, KASHMIR, DIC, hepatitis panel and HIV screening       2. Chronic Thrombocytopenia   More likely related to his known alcoholic cirrhosis, portal cirrhosis and splenomegaly. The recent relative drop is possible due to the acute infection/illness ; other differentials are under investigation as above.     3. Hepatic  "lesions   4. Elevated AFP   Has \"Several LI-RADS 3 observations\" on MRI abdomen 10/23/2023: 1.8x1.5 cm, 0.9x0.8 cm and 1.0 x 0.7 cm.     Patient was seen by Dr. Martins on 12/2023, was referred to GI, and has scheduled MRI Abdomen on 2/22/24.     Advised on higher risk of liver cancer than average given his liver cirrhosis and above findings, patient understand and to continue with close follow up with above concerning findings.   _________________________________  Outpatient follow up plan: follow up with Dr. Martins on 2/29/24     Communication with patient/family:  I did update the patient.     I did review this patient with Dr. Russell and he is in agreement with this plan.    __________________________________  Subjective    patient is seen and examined with my attending Dr. Russell.  He is setting comfortably in recliner.   Above A&P explained ; questions were answered.   We will continue to follow and revaluate.       Review of Systems  - GENERAL: Negative for any nausea, vomiting, fevers, chills, or weight loss.  - HEENT: Negative for any head/Neck trauma, pain, double/blurry vision, sinusitis, rhinitis, nose bleeding.  - CARDIAC: Negative for any chest pain, palpitation, Dyspnea on exertion, peripheral edema.  - PULMONARY: Negative for any SOB, cough, wheezing.   - GASTROINTESTINAL: positive for abdominal distension with relative relief after paracentesis today; Negative for any abdominal pain, N/V/D/C, blood in stool.   - GENITOURINARY: Negative for any dysuria, hematuria, incontinence.  - NEUROLOGIC: Negative for any muscle weakness, numbness/tingling, memory changes.    - MUSCULOSKELETAL: Negative for any joint pains/swelling, limited ROM.   - INTEGUMENTARY: positive for left foot wound; Negative for any rashes.   - HEMATOLOGIC: Negative for any abnormal bruising, frequent infections or bleeding.    Oncology History:   Cancer Staging   No matching staging information was found for the patient.    Oncology " History    No history exists.       Past Medical History:   Past Medical History:   Diagnosis Date    Arrhythmia     Chronic kidney disease     COVID 12/2020    CPAP (continuous positive airway pressure) dependence     Diabetes mellitus (HCC)     History of echocardiogram 11/14/2017    showed EF of 50-55 percentWith moderate LVH and left ventricle diastolic dysfunction. Left atrium was moderately enlarged. Trace MR noted.     History of Holter monitoring 11/21/2017    showed baseline rhythm of sinus origin with an average heart it of 61 bpm. The lowest heart rate was 49 and the highest heart rate was 10 8 bpm. There were rare single VPCs, and frequent PACs representing 3.2% of total beats. There were several episodes of sinus arrhythmias with sinus bradycardia and heart rate ranging from 40-90 bpm. No sustained dysrhythmias, or pauses noted. The patient did not    History of transfusion 04/2023    platelets    Liver disease     cirhosis    Murmur, cardiac     Obese     Sleep apnea        Past Surgical History:   Procedure Laterality Date    CATARACT EXTRACTION      EYE SURGERY Left 1997    FL GUIDED NEEDLE PLAC BX/ASP/INJ  8/28/2023    HERNIA REPAIR  [id number]    JOINT REPLACEMENT Right     TKR    KNEE ARTHROPLASTY Right 2008    CT ARTHROPLASTY GLENOHUMERAL JOINT TOTAL SHOULDER Left 04/04/2023    Procedure: ARTHROPLASTY SHOULDER REVERSE;  Surgeon: Marcelo Lester MD;  Location: [facility];  Service: Orthopedics    CT JARED SHOULDER ARTHRPLSTY HUMERAL&GLENOID COMPNT Left 9/8/2023    Procedure: removal of antibiotic spacer, incision and debridement,  with revision reverse shoulder arthroplasty;  Surgeon: Lita Aguilar;  Location:  MAIN OR;  Service: Orthopedics    CT JARED SHOULDER ARTHRPLSTY HUMERAL/GLENOID COMPNT Left 06/13/2023    Procedure: ARTHROPLASTY SHOULDER REVISION;  Surgeon: Lita Aguilar;  Location: [facility];  Service: Orthopedics    SHOULDER SURGERY Right 2002    WOUND DEBRIDEMENT Left  2023    Procedure: INCISION AND DRAINAGE (I&D) EXTREMITY, vac placement;  Surgeon: Marcelo Lester MD;  Location: [facility];  Service: Orthopedics       Family History   Problem Relation Age of Onset    Diabetes Mother     Supraventricular tachycardia Mother     COPD Father     Heart disease Father     Other Father         Sepsis; related to UTI with complicating cardiac problems    Heart disease Paternal Grandmother     Prostate cancer Paternal Grandfather 82       Social History     Socioeconomic History    Marital status: /Civil Union     Spouse name: None    Number of children: 2    Years of education: 16    Highest education level: Some college, no degree   Occupational History    None   Tobacco Use    Smoking status: Former     Current packs/day: 0.00     Average packs/day: 0.5 packs/day for 20.0 years (10.0 ttl pk-yrs)     Types: Cigarettes     Start date:      Quit date:      Years since quittin.1    Smokeless tobacco: Never   Vaping Use    Vaping status: Never Used   Substance and Sexual Activity    Alcohol use: Not Currently     Comment: quit     Drug use: Never    Sexual activity: Not Currently   Other Topics Concern    None   Social History Narrative    · Most recent tobacco use screenin2018      · Do you currently or have you served in the [employer]:   No      · Live alone or with others:   with others      · High blood pressure:   Yes      · Exercise level:   Occasional      · Overweight:   Yes      · Obese:   Yes      · Diabetes:   Yes      Social Determinants of Health     Financial Resource Strain: Not on file   Food Insecurity: No Food Insecurity (2023)    Hunger Vital Sign     Worried About Running Out of Food in the Last Year: Never true     Ran Out of Food in the Last Year: Never true   Transportation Needs: No Transportation Needs (2023)    PRAPARE - Transportation     Lack of Transportation (Medical): No     Lack of  Transportation (Non-Medical): No   Physical Activity: Not on file   Stress: Not on file   Social Connections: Not on file   Intimate Partner Violence: Not on file   Housing Stability: Low Risk  (9/8/2023)    Housing Stability Vital Sign     Unable to Pay for Housing in the Last Year: No     Number of Places Lived in the Last Year: 1     Unstable Housing in the Last Year: No         Current Facility-Administered Medications:     acetaminophen (TYLENOL) tablet 650 mg, 650 mg, Oral, Q6H PRN, Lou Smith MD    benzonatate (TESSALON PERLES) capsule 200 mg, 200 mg, Oral, TID PRN, Lou Smith MD    furosemide (LASIX) tablet 20 mg, 20 mg, Oral, BID (diuretic), Lou Smith MD, 20 mg at 02/19/24 0828    glimepiride (AMARYL) tablet 2 mg, 2 mg, Oral, BID, Lou Smith MD, 2 mg at 02/19/24 0828    insulin glargine (LANTUS) subcutaneous injection 30 Units 0.3 mL, 30 Units, Subcutaneous, HS, Lou Smith MD, 30 Units at 02/18/24 2140    insulin lispro (HumaLOG) 100 units/mL subcutaneous injection 1-5 Units, 1-5 Units, Subcutaneous, TID AC, 1 Units at 02/19/24 1240 **AND** Fingerstick Glucose (POCT), , , TID AC, Lou Smith MD    insulin lispro (HumaLOG) 100 units/mL subcutaneous injection 1-5 Units, 1-5 Units, Subcutaneous, HS, Lou Smith MD, 2 Units at 02/18/24 2140    levofloxacin (LEVAQUIN) tablet 750 mg, 750 mg, Oral, Q24H, Lou Smith MD, 750 mg at 02/18/24 1942    multivitamin stress formula tablet 1 tablet, 1 tablet, Oral, Daily, Lou Smith MD, 1 tablet at 02/19/24 0828    ondansetron (ZOFRAN) injection 4 mg, 4 mg, Intravenous, Q6H PRN, Lou Smith MD    tamsulosin (FLOMAX) capsule 0.4 mg, 0.4 mg, Oral, Daily With Dinner, Lou Smith MD, 0.4 mg at 02/18/24 1802    traZODone (DESYREL) tablet 50 mg, 50 mg, Oral, HS, Lou Smith MD, 50 mg at 02/18/24 2246    Medications Prior to Admission   Medication    levofloxacin (LEVAQUIN) 750 mg tablet    benzonatate  "(TESSALON) 200 MG capsule    furosemide (LASIX) 20 mg tablet    glimepiride (AMARYL) 4 mg tablet    Insulin Pen Needle (BD Pen Needle Nayla 2nd Gen) 32G X 4 MM MISC    Lantus SoloStar 100 units/mL SOPN    Multiple Vitamin (multivitamin) capsule    polyethylene glycol (GOLYTELY) 4000 mL solution    tamsulosin (FLOMAX) 0.4 mg    traZODone (DESYREL) 50 mg tablet       Allergies   Allergen Reactions    Sulfa Antibiotics Hives    Daptomycin Other (See Comments)     Possibly contributed to ABILIO on 6/2023 admission          Physical Exam:     /70   Pulse 99   Temp 97.6 °F (36.4 °C)   Resp 18   Ht 5' 11\" (1.803 m)   Wt 110 kg (242 lb 8.1 oz)   SpO2 100%   BMI 33.82 kg/m²     Physical Exam  Constitutional:       General: He is not in acute distress.     Appearance: Normal appearance. He is obese. He is not ill-appearing, toxic-appearing or diaphoretic.   HENT:      Head: Normocephalic and atraumatic.      Right Ear: External ear normal. There is no impacted cerumen.      Left Ear: External ear normal. There is no impacted cerumen.      Nose: Nose normal. No congestion or rhinorrhea.      Mouth/Throat:      Mouth: Mucous membranes are moist.      Pharynx: Oropharynx is clear. No posterior oropharyngeal erythema.   Eyes:      General: No scleral icterus.        Right eye: No discharge.         Left eye: No discharge.      Extraocular Movements: Extraocular movements intact.      Conjunctiva/sclera: Conjunctivae normal.      Pupils: Pupils are equal, round, and reactive to light.   Cardiovascular:      Rate and Rhythm: Tachycardia present.      Pulses: Normal pulses.      Heart sounds: Normal heart sounds. No murmur heard.     No gallop.   Pulmonary:      Effort: Pulmonary effort is normal. No respiratory distress.      Breath sounds: Normal breath sounds. No wheezing, rhonchi or rales.   Abdominal:      General: Bowel sounds are normal. There is distension.      Palpations: There is no mass.      Tenderness: There " is no abdominal tenderness. There is no right CVA tenderness, guarding or rebound.      Comments: Organomegaly and distended abdomen    Musculoskeletal:      Cervical back: Normal range of motion and neck supple. No rigidity.      Right lower leg: Edema present.      Left lower leg: Edema present.   Lymphadenopathy:      Cervical: No cervical adenopathy.   Skin:     Capillary Refill: Capillary refill takes less than 2 seconds.      Coloration: Skin is pale. Skin is not jaundiced.      Findings: Lesion present.      Comments: Left foot wound well dressed    Neurological:      General: No focal deficit present.      Mental Status: He is alert and oriented to person, place, and time. Mental status is at baseline.      Cranial Nerves: No cranial nerve deficit.      Motor: No weakness.   Psychiatric:         Mood and Affect: Mood normal.         Behavior: Behavior normal.         Thought Content: Thought content normal.         Judgment: Judgment normal.         Recent Results (from the past 48 hour(s))   ECG 12 lead    Collection Time: 02/18/24 12:22 PM   Result Value Ref Range    Ventricular Rate 98 BPM    Atrial Rate 98 BPM    MS Interval 156 ms    QRSD Interval 86 ms    QT Interval 382 ms    QTC Interval 487 ms    P Axis 84 degrees    QRS Axis 75 degrees    T Wave Axis -47 degrees   CBC and differential    Collection Time: 02/18/24  2:02 PM   Result Value Ref Range    WBC 3.60 (L) 4.31 - 10.16 Thousand/uL    RBC 2.39 (L) 3.88 - 5.62 Million/uL    Hemoglobin 4.9 (LL) 12.0 - 17.0 g/dL    Hematocrit 17.6 (L) 36.5 - 49.3 %    MCV 74 (L) 82 - 98 fL    MCH 20.5 (L) 26.8 - 34.3 pg    MCHC 27.8 (L) 31.4 - 37.4 g/dL    RDW 18.9 (H) 11.6 - 15.1 %    Platelets 47 (L) 149 - 390 Thousands/uL    nRBC 1 /100 WBCs    Neutrophils Relative 68 43 - 75 %    Immat GRANS % 1 0 - 2 %    Lymphocytes Relative 17 14 - 44 %    Monocytes Relative 8 4 - 12 %    Eosinophils Relative 6 0 - 6 %    Basophils Relative 0 0 - 1 %    Neutrophils  "Absolute 2.45 1.85 - 7.62 Thousands/µL    Immature Grans Absolute 0.03 0.00 - 0.20 Thousand/uL    Lymphocytes Absolute 0.62 0.60 - 4.47 Thousands/µL    Monocytes Absolute 0.27 0.17 - 1.22 Thousand/µL    Eosinophils Absolute 0.22 0.00 - 0.61 Thousand/µL    Basophils Absolute 0.01 0.00 - 0.10 Thousands/µL   Comprehensive metabolic panel    Collection Time: 02/18/24  2:02 PM   Result Value Ref Range    Sodium 138 135 - 147 mmol/L    Potassium 3.7 3.5 - 5.3 mmol/L    Chloride 106 96 - 108 mmol/L    CO2 28 21 - 32 mmol/L    ANION GAP 4 mmol/L    BUN 25 5 - 25 mg/dL    Creatinine 1.01 0.60 - 1.30 mg/dL    Glucose 157 (H) 65 - 140 mg/dL    Calcium 8.8 8.4 - 10.2 mg/dL    Corrected Calcium 9.7 8.3 - 10.1 mg/dL    AST 50 (H) 13 - 39 U/L    ALT 42 7 - 52 U/L    Alkaline Phosphatase 219 (H) 34 - 104 U/L    Total Protein 6.3 (L) 6.4 - 8.4 g/dL    Albumin 2.9 (L) 3.5 - 5.0 g/dL    Total Bilirubin 1.90 (H) 0.20 - 1.00 mg/dL    eGFR 75 ml/min/1.73sq m   Lipase    Collection Time: 02/18/24  2:02 PM   Result Value Ref Range    Lipase 24 11 - 82 u/L   Protime-INR    Collection Time: 02/18/24  2:02 PM   Result Value Ref Range    Protime 17.8 (H) 11.6 - 14.5 seconds    INR 1.49 (H) 0.84 - 1.19   APTT    Collection Time: 02/18/24  2:02 PM   Result Value Ref Range    PTT 50 (H) 23 - 37 seconds   HS Troponin 0hr (reflex protocol)    Collection Time: 02/18/24  2:02 PM   Result Value Ref Range    hs TnI 0hr 22 \"Refer to ACS Flowchart\"- see link ng/L   B-Type Natriuretic Peptide(BNP)    Collection Time: 02/18/24  2:02 PM   Result Value Ref Range     (H) 0 - 100 pg/mL   Smear Review(Phlebs Do Not Order)    Collection Time: 02/18/24  2:02 PM   Result Value Ref Range    RBC Morphology Present     Platelet Estimate Decreased (A) Adequate   CBC and differential    Collection Time: 02/18/24  3:23 PM   Result Value Ref Range    WBC 3.24 (L) 4.31 - 10.16 Thousand/uL    RBC 2.37 (L) 3.88 - 5.62 Million/uL    Hemoglobin 4.8 (LL) 12.0 - 17.0 " "g/dL    Hematocrit 17.6 (L) 36.5 - 49.3 %    MCV 74 (L) 82 - 98 fL    MCH 20.3 (L) 26.8 - 34.3 pg    MCHC 27.3 (L) 31.4 - 37.4 g/dL    RDW 19.0 (H) 11.6 - 15.1 %    Platelets 45 (L) 149 - 390 Thousands/uL    nRBC 1 /100 WBCs    Neutrophils Relative 64 43 - 75 %    Immat GRANS % 1 0 - 2 %    Lymphocytes Relative 20 14 - 44 %    Monocytes Relative 8 4 - 12 %    Eosinophils Relative 7 (H) 0 - 6 %    Basophils Relative 0 0 - 1 %    Neutrophils Absolute 2.11 1.85 - 7.62 Thousands/µL    Immature Grans Absolute 0.02 0.00 - 0.20 Thousand/uL    Lymphocytes Absolute 0.64 0.60 - 4.47 Thousands/µL    Monocytes Absolute 0.25 0.17 - 1.22 Thousand/µL    Eosinophils Absolute 0.21 0.00 - 0.61 Thousand/µL    Basophils Absolute 0.01 0.00 - 0.10 Thousands/µL   Type and screen    Collection Time: 02/18/24  3:23 PM   Result Value Ref Range    ABO Grouping A     Rh Factor Positive     Antibody Screen Negative     Specimen Expiration Date 20240221    HS Troponin I 2hr    Collection Time: 02/18/24  4:24 PM   Result Value Ref Range    hs TnI 2hr 20 \"Refer to ACS Flowchart\"- see link ng/L    Delta 2hr hsTnI -2 <20 ng/L   Fingerstick Glucose (POCT)    Collection Time: 02/18/24  6:02 PM   Result Value Ref Range    POC Glucose 113 65 - 140 mg/dl   HS Troponin I 4hr    Collection Time: 02/18/24  6:18 PM   Result Value Ref Range    hs TnI 4hr 21 \"Refer to ACS Flowchart\"- see link ng/L    Delta 4hr hsTnI -1 <20 ng/L   Wound culture and Gram stain    Collection Time: 02/18/24  7:08 PM    Specimen: Toe, Left; Wound   Result Value Ref Range    Wound Culture Culture too young- will reincubate     Gram Stain Result No Polys or Bacteria seen    Fingerstick Glucose (POCT)    Collection Time: 02/18/24  9:22 PM   Result Value Ref Range    POC Glucose 256 (H) 65 - 140 mg/dl   Hemoglobin and hematocrit, blood    Collection Time: 02/18/24 10:48 PM   Result Value Ref Range    Hemoglobin 5.7 (LL) 12.0 - 17.0 g/dL    Hematocrit 19.3 (L) 36.5 - 49.3 %   CBC    " Collection Time: 02/19/24  5:27 AM   Result Value Ref Range    WBC 3.32 (L) 4.31 - 10.16 Thousand/uL    RBC 3.25 (L) 3.88 - 5.62 Million/uL    Hemoglobin 7.6 (L) 12.0 - 17.0 g/dL    Hematocrit 25.7 (L) 36.5 - 49.3 %    MCV 79 (L) 82 - 98 fL    MCH 23.4 (L) 26.8 - 34.3 pg    MCHC 29.6 (L) 31.4 - 37.4 g/dL    RDW 19.9 (H) 11.6 - 15.1 %    Platelets 45 (L) 149 - 390 Thousands/uL   Basic metabolic panel    Collection Time: 02/19/24  5:27 AM   Result Value Ref Range    Sodium 141 135 - 147 mmol/L    Potassium 3.2 (L) 3.5 - 5.3 mmol/L    Chloride 106 96 - 108 mmol/L    CO2 28 21 - 32 mmol/L    ANION GAP 7 mmol/L    BUN 19 5 - 25 mg/dL    Creatinine 0.99 0.60 - 1.30 mg/dL    Glucose 119 65 - 140 mg/dL    Calcium 8.3 (L) 8.4 - 10.2 mg/dL    eGFR 76 ml/min/1.73sq m   Prepare Leukoreduced RBC: 2 Units, Leukoreduced    Collection Time: 02/19/24  5:55 AM   Result Value Ref Range    Unit Product Code Z1495P03     Unit Number [id number]     Unit ABO A     Unit RH POS     Crossmatch Compatible     Unit Dispense Status Presumed Trans     Unit Product Volume 350 mL    Unit Product Code O9295A19     Unit Number [id number]     Unit ABO A     Unit RH POS     Crossmatch Compatible     Unit Dispense Status Presumed Trans     Unit Product Volume 350 mL   Prepare Leukoreduced RBC: 2 Units, Leukoreduced    Collection Time: 02/19/24  6:52 AM   Result Value Ref Range    Unit Product Code B7932K47     Unit Number [id number]     Unit ABO A     Unit RH POS     Crossmatch Compatible     Unit Dispense Status Issued     Unit Product Volume 350 mL    Unit Product Code W3403S32     Unit Number [id number]     Unit ABO A     Unit RH POS     Crossmatch Compatible     Unit Dispense Status Presumed Trans     Unit Product Volume 350 mL   Fingerstick Glucose (POCT)    Collection Time: 02/19/24  7:32 AM   Result Value Ref Range    POC Glucose 105 65 - 140 mg/dl   Fingerstick Glucose (POCT)    Collection Time: 02/19/24 11:00 AM   Result  Value Ref Range    POC Glucose 166 (H) 65 - 140 mg/dl        VAS VENOUS DUPLEX - LOWER LIMB BILATERAL    Result Date: 2/19/2024  Narrative:  [facility] REPORT CLINICAL: Indications: Patient presents with bilateral lower extremity swelling. Operative History: No prior cardiovascular surgeries Risk Factors The patient has history of Obesity, HTN, Diabetes (IDDM) and previous smoking (quit >10yrs ago).   CONCLUSION:  Impression:  RIGHT LOWER LIMB: No evidence of acute or chronic deep vein thrombosis. No evidence of superficial thrombophlebitis noted. Doppler evaluation shows a normal response to augmentation maneuvers.. Popliteal, posterior tibial and anterior tibial arterial Doppler waveform's are triphasic.  LEFT LOWER LIMB: No evidence of acute or chronic deep vein thrombosis. No evidence of superficial thrombophlebitis noted. Doppler evaluation shows a normal response to augmentation maneuvers. Popliteal, posterior tibial and anterior tibial arterial Doppler waveform's are triphasic.  Tech Note: There are two echogenic structures located in the bilateral inguinal region measuring approximately 2.14 cm on the right and 3.56 cm on the left suggestive of enlarged lymphatic channels.  Tech note: A portion of the study was performed by current DMS student under direct supervision from a registered senior vascular technologist with approval from patient.  SIGNATURE: Electronically Signed by: LOUIE CASAS MD, RPVI on 2024-02-19 01:00:45 PM    XR chest 2 views    Result Date: 2/19/2024  Narrative: XR CHEST PA & LATERAL INDICATION: Dyspnea.. COMPARISON: 12/20/2023 FINDINGS: Pulmonary vascular congestion. Small pleural effusions. No pneumothorax. Enlarged cardiac silhouette, unchanged. No acute osseous abnormality within the limitations of portable radiography. Left reverse shoulder arthroplasty. Normal upper abdomen.     Impression: Pulmonary vascular congestion. Small pleural effusions. Enlarged cardiac  silhouette. Findings concur with the preliminary report by the referring clinician already in PACS and/or our electronic record EPIC. Workstation performed: [id number]     US bedside procedure    Result Date: 2/19/2024  Narrative: [id number]      Labs and pertinent reports reviewed.      This note has been generated by voice recognition software system.  Therefore, there may be spelling, grammar, and or syntax errors. Please contact if questions arise.    Winston Mcneill DO   Hematology and Medical Oncology - PGY IV  [facility]

## 2024-02-19 NOTE — ASSESSMENT & PLAN NOTE
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

## 2024-02-19 NOTE — ASSESSMENT & PLAN NOTE
Lab Results   Component Value Date    EGFR 76 02/19/2024    EGFR 75 02/18/2024    EGFR 67 12/21/2023    CREATININE 0.99 02/19/2024    CREATININE 1.01 02/18/2024    CREATININE 1.10 12/21/2023   Previous classification surrounding infection and daptomycin causing renal failure. Kidneys appear to have rebounded. Monitor for nephroxicity with drugs . Currently on Levaquin. Will continue and obtain new culture.

## 2024-02-19 NOTE — CONSULTS
Podiatry - Consultation    Patient Information:   Isael Avendano 68 y.o. male MRN: [id number]  Unit/Bed#: ED 03 Encounter: [id number]  PCP: Collin Marcos MD  Date of Admission:  2/18/2024  Date of Consultation: 02/19/24  Requesting Physician: Keyla Chauhan MD      ASSESSMENT:    Isael Avendano is a 68 y.o. male with:    Left foot wounds  T2DM    PLAN:    Plan to f/u XR of L foot to r/o osteomyelitis. Otherwise, wounds with no acute clinical signs of infection at this time.   Local wound care consisting of maxorb DSD. Wound care instructions placed. Appreciate nursing assistance with this.  Elevation and offloading on green foam wedges or pillows when non-ambulatory.  Rest of care per primary team.  Will discuss this plan with my attending and update as needed.    Weightbearing status: Weightbearing as tolerated    SUBJECTIVE:    History of Present Illness:    Isael Avendano is a 68 y.o. male who is originally admitted 2/18/2024 due to symptomatic anemia. Patient has a past medical history of T2DM, HTN, cirrhosis (alcohol abuse), stage 3 CKD.  We are consulted for left foot wounds. He has history of left submet 1 wound which he has seen Dr. Landin outpatient for. This has since healed. His new wounds are in the 4th interspace along lateral 4th digit and medial 5th digit. He has been in [place] for the past 6 weeks. His wife noted a smell which prompted them to find this wound. [place] the [place] while he was in [place]. He saw a podiatrist who gave him a course of levaquin which he is still finishing. Denies N/V/F/CP/SOB.    Review of Systems:    Constitutional: Negative.    HENT: Negative.    Eyes: Negative.    Respiratory: Negative.    Cardiovascular: Negative.    Gastrointestinal: Negative.    Musculoskeletal: negative   Skin:left foot wounds   Neurological: negative   Psych: Negative.     Past Medical and Surgical History:     Past Medical History:   Diagnosis Date    Arrhythmia     Chronic kidney disease      COVID 12/2020    CPAP (continuous positive airway pressure) dependence     Diabetes mellitus (HCC)     History of echocardiogram 11/14/2017    showed EF of 50-55 percentWith moderate LVH and left ventricle diastolic dysfunction. Left atrium was moderately enlarged. Trace MR noted.     History of Holter monitoring 11/21/2017    showed baseline rhythm of sinus origin with an average heart it of 61 bpm. The lowest heart rate was 49 and the highest heart rate was 10 8 bpm. There were rare single VPCs, and frequent PACs representing 3.2% of total beats. There were several episodes of sinus arrhythmias with sinus bradycardia and heart rate ranging from 40-90 bpm. No sustained dysrhythmias, or pauses noted. The patient did not    History of transfusion 04/2023    platelets    Liver disease     cirhosis    Obese     Sleep apnea        Past Surgical History:   Procedure Laterality Date    CATARACT EXTRACTION      EYE SURGERY Left 1997    FL GUIDED NEEDLE PLAC BX/ASP/INJ  8/28/2023    HERNIA REPAIR  [id number]    JOINT REPLACEMENT Right     TKR    KNEE ARTHROPLASTY Right 2008    KY ARTHROPLASTY GLENOHUMERAL JOINT TOTAL SHOULDER Left 04/04/2023    Procedure: ARTHROPLASTY SHOULDER REVERSE;  Surgeon: Marcelo Lester MD;  Location: [facility];  Service: Orthopedics    KY JARED SHOULDER ARTHRPLSTY HUMERAL&GLENOID COMPNT Left 9/8/2023    Procedure: removal of antibiotic spacer, incision and debridement,  with revision reverse shoulder arthroplasty;  Surgeon: Lita Aguilar;  Location: [facility];  Service: Orthopedics    KY JARED SHOULDER ARTHRPLSTY HUMERAL/GLENOID COMPNT Left 06/13/2023    Procedure: ARTHROPLASTY SHOULDER REVISION;  Surgeon: Lita Aguilar;  Location: [facility];  Service: Orthopedics    SHOULDER SURGERY Right 2002    WOUND DEBRIDEMENT Left 05/02/2023    Procedure: INCISION AND DRAINAGE (I&D) EXTREMITY, vac placement;  Surgeon: Marcelo Lester MD;  Location: [facility];  Service: Orthopedics  "      Meds/Allergies:    (Not in a hospital admission)      Allergies   Allergen Reactions    Sulfa Antibiotics Hives    Daptomycin Other (See Comments)     Possibly contributed to ABILIO on 2023 admission        Social History:     Marital Status: /Civil Union    Substance Use History:   Social History     Substance and Sexual Activity   Alcohol Use Not Currently    Comment: quit      Social History     Tobacco Use   Smoking Status Former    Current packs/day: 0.00    Average packs/day: 0.5 packs/day for 20.0 years (10.0 ttl pk-yrs)    Types: Cigarettes    Start date:     Quit date:     Years since quittin.1   Smokeless Tobacco Never     Social History     Substance and Sexual Activity   Drug Use Never       Family History:    Family History   Problem Relation Age of Onset    Diabetes Mother     Supraventricular tachycardia Mother     COPD Father     Heart disease Father     Other Father         Sepsis; related to UTI with complicating cardiac problems    Heart disease Paternal Grandmother     Prostate cancer Paternal Grandfather 82         OBJECTIVE:    Vitals:   Blood Pressure: 133/68 (24 0500)  Pulse: 93 (24 0500)  Temperature: 98 °F (36.7 °C) (24 024)  Temp Source: Oral (24 024)  Respirations: 18 (24 0500)  Height: 5' 11\" (180.3 cm) (24)  Weight - Scale: 110 kg (242 lb 8.1 oz) (24)  SpO2: 99 % (24 0500)    Physical Exam:    General Appearance: Alert, cooperative, no distress.  HEENT: Head normocephalic, atraumatic, without obvious abnormality.  Heart: Normal rate and rhythm.  Lungs: Non-labored breathing. No respiratory distress.  Abdomen: Without distension.  Psychiatric: AAOx3  Lower Extremity:    Vascular:   DP: Right: 2+ Left: 2+  PT: Right: 2+ Left: 2+  CRT < 3 seconds at the digits. +2/4 edema noted at bilateral lower extremities.   Pedal hair is absent.   Skin temperature is WNL bilaterally.    Musculoskeletal:  MMT is " 5/5 in all muscle compartments bilaterally.   ROM at the 1st MPJ and ankle joint are limited bilaterally with the leg extended.   No Pain on palpation of bilateral feet.   No gross deformities noted.     Dermatological:  Lower extremity wound(s) as noted below:    Wound #: 1  Location: left 4th digit  Length 1cm: Width 1cm: Depth 0.2cm:   Deepest Tissue Noted in Base: capsule  Probe to Bone: No  Peripheral Skin Description: Attached  Granulation: 0% Fibrotic Tissue: 100% Necrotic Tissue: 0%   Drainage Amount: moderate serous  Signs of Infection: No      Wound #: 2  Location: left 5th digit  Length 0.8cm: Width 0.4cm: Depth 0.1cm:   Deepest Tissue Noted in Base: subcutaneous  Probe to Bone: No  Peripheral Skin Description: Attached  Granulation: 100% Fibrotic Tissue: 0% Necrotic Tissue: 0%   Drainage Amount: minimal  Signs of Infection: No    Neurological:  Gross sensation is intact.   Light touch is diminished.   Protective sensation is diminished.    Clinical Images 02/19/24:            Additional data:     Lab Results: I have personally reviewed pertinent labs including:    Results from last 7 days   Lab Units 02/19/24  0527 02/18/24  2248 02/18/24  1523   WBC Thousand/uL 3.32*  --  3.24*   HEMOGLOBIN g/dL 7.6*   < > 4.8*   HEMATOCRIT % 25.7*   < > 17.6*   PLATELETS Thousands/uL 45*  --  45*   NEUTROS PCT %  --   --  64   LYMPHS PCT %  --   --  20   MONOS PCT %  --   --  8   EOS PCT %  --   --  7*    < > = values in this interval not displayed.     Results from last 7 days   Lab Units 02/19/24  0527 02/18/24  1402   POTASSIUM mmol/L 3.2* 3.7   CHLORIDE mmol/L 106 106   CO2 mmol/L 28 28   BUN mg/dL 19 25   CREATININE mg/dL 0.99 1.01   CALCIUM mg/dL 8.3* 8.8   ALK PHOS U/L  --  219*   ALT U/L  --  42   AST U/L  --  50*     Results from last 7 days   Lab Units 02/18/24  1402   INR  1.49*       Cultures: I have personally reviewed pertinent cultures including:    Results from last 7 days   Lab Units 02/18/24  1908  "  GRAM STAIN RESULT  No Polys or Bacteria seen           Imaging: I have personally reviewed pertinent reports in PACS.  EKG, Pathology, and Other Studies: I have personally reviewed pertinent reports.    Time Spent for Care: 30 minutes.  More than 50% of total time spent on counseling and coordination of care as described above.      ** Please Note: Portions of the record may have been created with voice recognition software. Occasional wrong word or \"sound a like\" substitutions may have occurred due to the inherent limitations of voice recognition software. Read the chart carefully and recognize, using context, where substitutions have occurred. **    "

## 2024-02-19 NOTE — ASSESSMENT & PLAN NOTE
Lab Results   Component Value Date    HGBA1C 7.3 (H) 08/18/2023       Recent Labs     02/18/24  1802 02/18/24  2122 02/19/24  0732 02/19/24  1100   POCGLU 113 256* 105 166*       Blood Sugar Average: Last 72 hrs:  Pt has been on levquin started in [place], just got off the plane and has 1 pill left with a refill. Was told he had contracted a bacteria from the water. Need records from [facility]  Phone number [phone] Dr. Fong.   Podiatry consult. Pt using betadine and Derma col/ag between 4th and 5th toes.   Adjust BS medications.

## 2024-02-19 NOTE — CONSULTS
Consultation -  [facility]  Isael Avendano 68 y.o. male MRN: [id number]  Unit/Bed#: NW8 861-02 Encounter: [id number]            Inpatient consult to gastroenterology     Date/Time  2/19/2024 5:23 PM     Performed by  Cait Heller DO   Authorized by  Keyla Chauhan MD             Reason for Consult / Principal Problem: Anemia        ASSESSMENT AND PLAN:      Alcohol Cirrhosis (MELD-Na 13): Patient with history of alcohol cirrhosis.  Previously well compensated but complicated by presence of LI-RADS 3 lesions and medium size esophageal varices.  No prior history of HE or ascites.  Now presenting with pancytopenia and ascites with lower extremity edema.  Unclear etiology of pancytopenia as patient has had no signs of overt GI bleeding.  Does have prior history of esophageal varices.  Unlikely that this is the source of bleeding given lack of overt evidence of GIB , but will plan to proceed with EGD to further evaluate tomorrow.  Additionally, unclear if patient's ascites is due to portal hypertension or other cause.  Will plan to follow-up on fluid studies.  However, would also recommend that repeat echocardiogram be completed along with MRI/MRCP imaging given prior history of LI-RADS 3 lesion.    MELD 3.0: 14 at 2/19/2024  5:27 AM  MELD-Na: 13 at 2/19/2024  5:27 AM  Calculated from:  Serum Creatinine: 0.99 mg/dL (Using min of 1 mg/dL) at 2/19/2024  5:27 AM  Serum Sodium: 141 mmol/L (Using max of 137 mmol/L) at 2/19/2024  5:27 AM  Total Bilirubin: 1.90 mg/dL at 2/18/2024  2:02 PM  Serum Albumin: 2.9 g/dL at 2/18/2024  2:02 PM  INR(ratio): 1.49 at 2/18/2024  2:02 PM  Age at listing (hypothetical): 68 years  Sex: Male at 2/19/2024  5:27 AM    # Anemia with Pancytopenia:   Hemoglobin on admission 4.9 from baseline 9-10, platelets 47  S/P 2U pRBC with improvement to 7.6  No evidence of overt GI bleed, and no prior history of GI bleed  Hematology and oncology currently consulted, appreciate  input  Status post colonoscopy in 2022, no indication to repeat at this time  NPO at midnight for EGD tomorrow to evaluate for upper GI source of blood loss including EV    # Ascites:   No prior history of ascites or need for paracentesis  Unclear if ascites is related to portal hypertension or other cause  We will follow-up on SAAG studies; TTE also ordered along with MRI/MRCP  Current regimen: Lasix 20 mg IV daily   Home regimen: Lasix 20 mg every day   Most recent paracentesis: 2/19 - 1800 cc removed; SBP labs pending  Currently on Levaquin 750 mg q 24 hours for skin infection, also covers for SBP  Limit dietary sodium intake to less than 2g/day   Avoid use of ACE, ARBs, and NSAIDs    # Esophageal Varcies:   Most recent EGD: 12/19/23 - multiple medium EV and PHG  Current regimen: None  Home regimen: None  Plan for repeat EGD tomorrow, NPO at midnight    # Liver Lesion:  AFP level 9/1/23 - 29.68  MRI/MRCP 10/23/23 - Several LI-RADS 3 Observations  Follow up MRI Abdomen scheduled for 2/22/24  Currently followed with Oncology Dr. Martins   Repeat AFP and MRI/MRCP ordered  ______________________________________________________________________    HPI:  Mr. Isael Avendano is a 68-year-old male with a past medical history of alcohol cirrhosis, diabetes mellitus type 2, hypertension, CKD, and MEG who initially presented to \[facility]\"" ED on 2/18/2024 with complaint of dyspnea on exertion and increasing lower extremity edema.  Also complained of increasing abdominal distention.  Patient states that a couple weeks ago, he had been vacationing in [place].  States that his during his vacation, he developed a right leg infection after being in the water.  Patient unclear of the exact bacterial infection he had but was treated with antibiotics.  Following this episode, patient noted increasing lower extremity edema bilaterally accompanied by abdominal distention.  Explains that it got to the point that his shirts were no longer fitting him.   Symptoms progressed to involve shortness of breath which ultimately prompted his ED arrival.    At time of ED arrival, patient hemodynamically stable and afebrile.  Blood work revealed hemoglobin of 4.9 down from a baseline of 9-10..  Patient also noted to have a WBC of 3.6 and platelet count of 47.  INR 1.49.  AST/ALT 50/42, alkaline phosphatase 219, total bilirubin 1.90.  BUN/creatinine 25/1.01.  No imaging available to review.    Patient seen and examined at time of consultation.  Patient sitting in bedside chair comfortably in no acute distress or visible discomfort.  Patient states that he feels better following IR paracentesis with removal of 1800 cc and transfusion of 2 units of PRBC with improvement in hemoglobin.  He states that prior to this admission, he has been having no episodes of hematochezia or hematemesis.  States that he has been having brown stools.  States that for the last 2 years he has been sober and no longer drinking.  He denies prior history of ascites and states that he is currently on Lasix for lower extremity edema.  Denies history of prior GI bleed or hepatic encephalopathy.    REVIEW OF SYSTEMS:    CONSTITUTIONAL: Denies any fever, chills, rigors, and weight loss.  HEENT: No earache or tinnitus. Denies hearing loss or visual disturbances.  CARDIOVASCULAR: No chest pain or palpitations.   RESPIRATORY: Denies any cough, hemoptysis, shortness of breath or dyspnea on exertion.  GASTROINTESTINAL: As noted in the History of Present Illness.   GENITOURINARY: No problems with urination. Denies any hematuria or dysuria.  NEUROLOGIC: No dizziness or vertigo, denies headaches.   MUSCULOSKELETAL: Denies any muscle or joint pain.   SKIN: Denies skin rashes or itching.   ENDOCRINE: Denies excessive thirst. Denies intolerance to heat or cold.  PSYCHOSOCIAL: Denies depression or anxiety. Denies any recent memory loss.       Historical Information   Past Medical History:   Diagnosis Date     Arrhythmia     Chronic kidney disease     COVID 12/2020    CPAP (continuous positive airway pressure) dependence     Diabetes mellitus (HCC)     History of echocardiogram 11/14/2017    showed EF of 50-55 percentWith moderate LVH and left ventricle diastolic dysfunction. Left atrium was moderately enlarged. Trace MR noted.     History of Holter monitoring 11/21/2017    showed baseline rhythm of sinus origin with an average heart it of 61 bpm. The lowest heart rate was 49 and the highest heart rate was 10 8 bpm. There were rare single VPCs, and frequent PACs representing 3.2% of total beats. There were several episodes of sinus arrhythmias with sinus bradycardia and heart rate ranging from 40-90 bpm. No sustained dysrhythmias, or pauses noted. The patient did not    History of transfusion 04/2023    platelets    Liver disease     cirhosis    Murmur, cardiac     Obese     Sleep apnea      Past Surgical History:   Procedure Laterality Date    CATARACT EXTRACTION      EYE SURGERY Left 1997    FL GUIDED NEEDLE PLAC BX/ASP/INJ  8/28/2023    HERNIA REPAIR  [id number]    JOINT REPLACEMENT Right     TKR    KNEE ARTHROPLASTY Right 2008    TX ARTHROPLASTY GLENOHUMERAL JOINT TOTAL SHOULDER Left 04/04/2023    Procedure: ARTHROPLASTY SHOULDER REVERSE;  Surgeon: Marcelo Lester MD;  Location: [facility];  Service: Orthopedics    TX JARED SHOULDER ARTHRPLSTY HUMERAL&GLENOID COMPNT Left 9/8/2023    Procedure: removal of antibiotic spacer, incision and debridement,  with revision reverse shoulder arthroplasty;  Surgeon: Lita Aguilar;  Location: [facility];  Service: Orthopedics    TX JARED SHOULDER ARTHRPLSTY HUMERAL/GLENOID COMPNT Left 06/13/2023    Procedure: ARTHROPLASTY SHOULDER REVISION;  Surgeon: Lita Aguilar;  Location: [facility];  Service: Orthopedics    SHOULDER SURGERY Right 2002    WOUND DEBRIDEMENT Left 05/02/2023    Procedure: INCISION AND DRAINAGE (I&D) EXTREMITY, vac placement;  Surgeon: Marcelo Mata  MD Trevon;  Location: [facility];  Service: Orthopedics     Social History   Social History     Substance and Sexual Activity   Alcohol Use Not Currently    Comment: quit      Social History     Substance and Sexual Activity   Drug Use Never     Social History     Tobacco Use   Smoking Status Former    Current packs/day: 0.00    Average packs/day: 0.5 packs/day for 20.0 years (10.0 ttl pk-yrs)    Types: Cigarettes    Start date:     Quit date:     Years since quittin.1   Smokeless Tobacco Never     Family History   Problem Relation Age of Onset    Diabetes Mother     Supraventricular tachycardia Mother     COPD Father     Heart disease Father     Other Father         Sepsis; related to UTI with complicating cardiac problems    Heart disease Paternal Grandmother     Prostate cancer Paternal Grandfather 82       Meds/Allergies     Medications Prior to Admission   Medication    levofloxacin (LEVAQUIN) 750 mg tablet    benzonatate (TESSALON) 200 MG capsule    furosemide (LASIX) 20 mg tablet    glimepiride (AMARYL) 4 mg tablet    Insulin Pen Needle (BD Pen Needle Nayla 2nd Gen) 32G X 4 MM MISC    Lantus SoloStar 100 units/mL SOPN    Multiple Vitamin (multivitamin) capsule    polyethylene glycol (GOLYTELY) 4000 mL solution    tamsulosin (FLOMAX) 0.4 mg    traZODone (DESYREL) 50 mg tablet     Current Facility-Administered Medications   Medication Dose Route Frequency    acetaminophen (TYLENOL) tablet 650 mg  650 mg Oral Q6H PRN    benzonatate (TESSALON PERLES) capsule 200 mg  200 mg Oral TID PRN    furosemide (LASIX) tablet 20 mg  20 mg Oral BID (diuretic)    glimepiride (AMARYL) tablet 2 mg  2 mg Oral BID    insulin glargine (LANTUS) subcutaneous injection 30 Units 0.3 mL  30 Units Subcutaneous HS    insulin lispro (HumaLOG) 100 units/mL subcutaneous injection 1-5 Units  1-5 Units Subcutaneous TID AC    insulin lispro (HumaLOG) 100 units/mL subcutaneous injection 1-5 Units  1-5 Units Subcutaneous HS     "levofloxacin (LEVAQUIN) tablet 750 mg  750 mg Oral Q24H    multivitamin stress formula tablet 1 tablet  1 tablet Oral Daily    ondansetron (ZOFRAN) injection 4 mg  4 mg Intravenous Q6H PRN    tamsulosin (FLOMAX) capsule 0.4 mg  0.4 mg Oral Daily With Dinner    traZODone (DESYREL) tablet 50 mg  50 mg Oral HS       Allergies   Allergen Reactions    Sulfa Antibiotics Hives    Daptomycin Other (See Comments)     Possibly contributed to ABILIO on 6/2023 admission            Objective     Blood pressure 143/70, pulse 99, temperature 97.6 °F (36.4 °C), resp. rate 18, height 5' 11\" (1.803 m), weight 110 kg (242 lb 8.1 oz), SpO2 100%. Body mass index is 33.82 kg/m².      Intake/Output Summary (Last 24 hours) at 2/19/2024 1642  Last data filed at 2/19/2024 1329  Gross per 24 hour   Intake 850 ml   Output 2700 ml   Net -1850 ml         Physical Exam  Vitals and nursing note reviewed.   Constitutional:       General: He is not in acute distress.     Appearance: He is well-developed. He is obese.   HENT:      Head: Normocephalic and atraumatic.   Eyes:      General: No scleral icterus.     Conjunctiva/sclera: Conjunctivae normal.   Cardiovascular:      Rate and Rhythm: Normal rate.      Pulses: Normal pulses.   Pulmonary:      Effort: Pulmonary effort is normal. No respiratory distress.      Breath sounds: Normal breath sounds.   Abdominal:      General: There is distension.      Tenderness: There is no abdominal tenderness.   Musculoskeletal:         General: No swelling.      Cervical back: Neck supple.      Right lower leg: Edema present.      Left lower leg: Edema present.   Skin:     General: Skin is warm and dry.      Capillary Refill: Capillary refill takes less than 2 seconds.   Neurological:      Mental Status: He is alert and oriented to person, place, and time.      Comments: No asterixis on examination   Psychiatric:         Mood and Affect: Mood normal.           Lab Results:   Admission on 02/18/2024   Component Date " Value    Ventricular Rate 02/18/2024 98     Atrial Rate 02/18/2024 98     NJ Interval 02/18/2024 156     QRSD Interval 02/18/2024 86     QT Interval 02/18/2024 382     QTC Interval 02/18/2024 487     P Axis 02/18/2024 84     QRS Axis 02/18/2024 75     T Wave Axis 02/18/2024 -47     WBC 02/18/2024 3.60 (L)     RBC 02/18/2024 2.39 (L)     Hemoglobin 02/18/2024 4.9 (LL)     Hematocrit 02/18/2024 17.6 (L)     MCV 02/18/2024 74 (L)     MCH 02/18/2024 20.5 (L)     MCHC 02/18/2024 27.8 (L)     RDW 02/18/2024 18.9 (H)     Platelets 02/18/2024 47 (L)     nRBC 02/18/2024 1     Neutrophils Relative 02/18/2024 68     Immat GRANS % 02/18/2024 1     Lymphocytes Relative 02/18/2024 17     Monocytes Relative 02/18/2024 8     Eosinophils Relative 02/18/2024 6     Basophils Relative 02/18/2024 0     Neutrophils Absolute 02/18/2024 2.45     Immature Grans Absolute 02/18/2024 0.03     Lymphocytes Absolute 02/18/2024 0.62     Monocytes Absolute 02/18/2024 0.27     Eosinophils Absolute 02/18/2024 0.22     Basophils Absolute 02/18/2024 0.01     Sodium 02/18/2024 138     Potassium 02/18/2024 3.7     Chloride 02/18/2024 106     CO2 02/18/2024 28     ANION GAP 02/18/2024 4     BUN 02/18/2024 25     Creatinine 02/18/2024 1.01     Glucose 02/18/2024 157 (H)     Calcium 02/18/2024 8.8     Corrected Calcium 02/18/2024 9.7     AST 02/18/2024 50 (H)     ALT 02/18/2024 42     Alkaline Phosphatase 02/18/2024 219 (H)     Total Protein 02/18/2024 6.3 (L)     Albumin 02/18/2024 2.9 (L)     Total Bilirubin 02/18/2024 1.90 (H)     eGFR 02/18/2024 75     Lipase 02/18/2024 24     Protime 02/18/2024 17.8 (H)     INR 02/18/2024 1.49 (H)     PTT 02/18/2024 50 (H)     hs TnI 0hr 02/18/2024 22     BNP 02/18/2024 576 (H)     WBC 02/18/2024 3.24 (L)     RBC 02/18/2024 2.37 (L)     Hemoglobin 02/18/2024 4.8 (LL)     Hematocrit 02/18/2024 17.6 (L)     MCV 02/18/2024 74 (L)     MCH 02/18/2024 20.3 (L)     MCHC 02/18/2024 27.3 (L)     RDW 02/18/2024 19.0 (H)      Platelets 02/18/2024 45 (L)     nRBC 02/18/2024 1     Neutrophils Relative 02/18/2024 64     Immat GRANS % 02/18/2024 1     Lymphocytes Relative 02/18/2024 20     Monocytes Relative 02/18/2024 8     Eosinophils Relative 02/18/2024 7 (H)     Basophils Relative 02/18/2024 0     Neutrophils Absolute 02/18/2024 2.11     Immature Grans Absolute 02/18/2024 0.02     Lymphocytes Absolute 02/18/2024 0.64     Monocytes Absolute 02/18/2024 0.25     Eosinophils Absolute 02/18/2024 0.21     Basophils Absolute 02/18/2024 0.01     ABO Grouping 02/18/2024 A     Rh Factor 02/18/2024 Positive     Antibody Screen 02/18/2024 Negative     Specimen Expiration Date 02/18/2024 [id number]     RBC Morphology 02/18/2024 Present     Platelet Estimate 02/18/2024 Decreased (A)     Unit Product Code 02/19/2024 [id number]     Unit Number 02/19/2024 [id number]     Unit ABO 02/19/2024 A     Unit RH 02/19/2024 POS     Crossmatch 02/19/2024 Compatible     Unit Dispense Status 02/19/2024 Presumed Trans     Unit Product Volume 02/19/2024 350     Unit Product Code 02/19/2024 [id number]     Unit Number 02/19/2024 [id number]     Unit ABO 02/19/2024 A     Unit RH 02/19/2024 POS     Crossmatch 02/19/2024 Compatible     Unit Dispense Status 02/19/2024 Presumed Trans     Unit Product Volume 02/19/2024 350     hs TnI 2hr 02/18/2024 20     Delta 2hr hsTnI 02/18/2024 -2     hs TnI 4hr 02/18/2024 21     Delta 4hr hsTnI 02/18/2024 -1     POC Glucose 02/18/2024 113     Wound Culture 02/18/2024 Culture too young- will reincubate     Gram Stain Result 02/18/2024 No Polys or Bacteria seen     Hemoglobin 02/18/2024 5.7 (LL)     Hematocrit 02/18/2024 19.3 (L)     POC Glucose 02/18/2024 256 (H)     Unit Product Code 02/19/2024 [id number]     Unit Number 02/19/2024 [id number]     Unit ABO 02/19/2024 A     Unit RH 02/19/2024 POS     Crossmatch 02/19/2024 Compatible     Unit Dispense Status 02/19/2024 Issued     Unit Product Volume 02/19/2024 350     Unit Product Code  02/19/2024 [id number]     Unit Number 02/19/2024 [id number]     Unit ABO 02/19/2024 A     Unit RH 02/19/2024 POS     Crossmatch 02/19/2024 Compatible     Unit Dispense Status 02/19/2024 Presumed Trans     Unit Product Volume 02/19/2024 350     WBC 02/19/2024 3.32 (L)     RBC 02/19/2024 3.25 (L)     Hemoglobin 02/19/2024 7.6 (L)     Hematocrit 02/19/2024 25.7 (L)     MCV 02/19/2024 79 (L)     MCH 02/19/2024 23.4 (L)     MCHC 02/19/2024 29.6 (L)     RDW 02/19/2024 19.9 (H)     Platelets 02/19/2024 45 (L)     Sodium 02/19/2024 141     Potassium 02/19/2024 3.2 (L)     Chloride 02/19/2024 106     CO2 02/19/2024 28     ANION GAP 02/19/2024 7     BUN 02/19/2024 19     Creatinine 02/19/2024 0.99     Glucose 02/19/2024 119     Calcium 02/19/2024 8.3 (L)     eGFR 02/19/2024 76     POC Glucose 02/19/2024 105     POC Glucose 02/19/2024 166 (H)     Protein, Fluid 02/19/2024 <3.0     Glucose, Fluid 02/19/2024 165     Albumin, Fluid 02/19/2024 <1.5     LD, Fluid 02/19/2024 34        Imaging Studies: I have personally reviewed pertinent imaging studies.

## 2024-02-19 NOTE — PROGRESS NOTES
[facility]  Progress Note  Name: Isael Avendano I  MRN: [id number]  Unit/Bed#: NW8 861-02 I Date of Admission: 2/18/2024   Date of Service: 2/19/2024 I Hospital Day: 1    Assessment/Plan   Urinary retention  Assessment & Plan  Continue flomax    Murmur  Assessment & Plan  Noted. Last Echo 6/23 :  Left Ventricle: Left ventricular cavity size is normal. Wall thickness is normal. The left ventricular ejection fraction is 60%. Systolic function is normal. Wall motion is normal. Diastolic function is mildly abnormal, consistent with grade I (abnormal) relaxation.    Left Atrium: The atrium is dilated.    Right Atrium: The atrium is dilated.    Aortic Valve: There is mild stenosis. The aortic valve mean gradient is 12.0 mmHg.    Mitral Valve: There is moderate annular calcification.       Stage 3a chronic kidney disease (HCC)  Assessment & Plan  Lab Results   Component Value Date    EGFR 76 02/19/2024    EGFR 75 02/18/2024    EGFR 67 12/21/2023    CREATININE 0.99 02/19/2024    CREATININE 1.01 02/18/2024    CREATININE 1.10 12/21/2023   Previous classification surrounding infection and daptomycin causing renal failure. Kidneys appear to have rebounded. Monitor for nephroxicity with drugs . Currently on Levaquin. Will continue and obtain new culture.     Diabetic ulcer of left foot associated with type 2 diabetes mellitus (HCC)  Assessment & Plan  Lab Results   Component Value Date    HGBA1C 7.3 (H) 08/18/2023       Recent Labs     02/18/24  1802 02/18/24  2122 02/19/24  0732 02/19/24  1100   POCGLU 113 256* 105 166*       Blood Sugar Average: Last 72 hrs:  Pt has been on levquin started in [place], just got off the plane and has 1 pill left with a refill. Was told he had contracted a bacteria from the water. Need records from [facility]  Phone number [phone] Dr. Fong.   Podiatry consult. Pt using betadine and Derma col/ag between 4th and 5th toes.   Adjust BS  medications.     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 He did notice increased leg swelling in the context of being on vacation and treating a let diabetic foot ulcer. Swelling increased in legs to the level fo the abdomen and increase ascities. He ha never has paracentesis.   Doppler US legs r/o d/v  Currently on 20 mg lasix po daily . Will likely need additional lasix either IV or PO. Will continue twenty but in crease to BID dosing po and give one dose IV lasix tonight. History of renal failure end of last year review shows baseline creat 1.0 was as high as 1.15 (previous .8-.9 ).     Essential hypertension  Assessment & Plan  Pt no longer on medication for blood pressure.   BP is 148/60    DM2 (diabetes mellitus, type 2) (formerly [facility])  Assessment & Plan  Lab Results   Component Value Date    HGBA1C 7.3 (H) 08/18/2023       Recent Labs     02/18/24  1802 02/18/24  2122 02/19/24  0732 02/19/24  1100   POCGLU 113 256* 105 166*       Blood Sugar Average: Last 72 hrs:  (P) 160Pt reports his BS have been decreased in the am. Will decreased his baseline Lantus from 40 units to 30 units and use ssi. Ok the continue amaryl but may be the offending agent . So depending on Glucose trends have low threshold to hold amaryl.  Since he is taking it twice a day will decrease to 2 mg.          * Symptomatic anemia  Assessment & Plan  No reported hematemesis, melena or hematochezia. Has history of grade 2 varicies last EGD 12/23. Hx of pancytopenia deemed consumptive in context of splenomegaly. Pt also recently has been being treat for Diabetic foot ulcer so chronic inflammation  "likely playing a role.   Will consult GI, however given pancytopenia will consult hematology as well.                VTE Pharmacologic Prophylaxis: VTE Score: 4  holding heparin currently.     Mobility:      HLM Goal achieved. Continue to encourage appropriate mobility.    Patient Centered Rounds: I performed bedside rounds with nursing staff today.   Discussions with Specialists or Other Care Team Provider: discussed with nursing .    Education and Discussions with Family / Patient: Updated  (wife and daughter) at bedside.    Total Time Spent on Date of Encounter in care of patient: 30 mins. This time was spent on one or more of the following: performing physical exam; counseling and coordination of care; obtaining or reviewing history; documenting in the medical record; reviewing/ordering tests, medications or procedures; communicating with other healthcare professionals and discussing with patient's family/caregivers.    Current Length of Stay: 1 day(s)  Current Patient Status: Inpatient   Certification Statement: The patient will continue to require additional inpatient hospital stay due to ascites, anemia.   Discharge Plan: Anticipate discharge in 24-48 hrs to home.    Code Status: Level 1 - Full Code    Subjective:   Patient seen and examined   Feeling \"the same\"    Objective:     Vitals:   Temp (24hrs), Av.9 °F (36.6 °C), Min:97.6 °F (36.4 °C), Max:98.3 °F (36.8 °C)    Temp:  [97.6 °F (36.4 °C)-98.3 °F (36.8 °C)] 97.6 °F (36.4 °C)  HR:  [] 103  Resp:  [16-18] 18  BP: (120-154)/(56-99) 148/66  SpO2:  [97 %-100 %] 99 %  Body mass index is 33.82 kg/m².     Input and Output Summary (last 24 hours):     Intake/Output Summary (Last 24 hours) at 2024 1508  Last data filed at 2024 1329  Gross per 24 hour   Intake 850 ml   Output 2700 ml   Net -1850 ml       Physical Exam:   Physical Exam  Constitutional:       General: He is not in acute distress.     Appearance: He is not " ill-appearing, toxic-appearing or diaphoretic.   HENT:      Head: Normocephalic.   Eyes:      Pupils: Pupils are equal, round, and reactive to light.   Cardiovascular:      Rate and Rhythm: Normal rate.   Abdominal:      General: There is distension.      Palpations: There is no mass.      Tenderness: There is no abdominal tenderness. There is no right CVA tenderness, left CVA tenderness, guarding or rebound.      Hernia: No hernia is present.      Comments: Shifting dullness on exam      Musculoskeletal:      Right lower leg: No edema.      Left lower leg: No edema.   Skin:     Coloration: Skin is not jaundiced or pale.      Findings: No bruising, erythema, lesion or rash.   Neurological:      General: No focal deficit present.      Mental Status: He is alert.      Cranial Nerves: No cranial nerve deficit.      Sensory: No sensory deficit.      Motor: No weakness.      Coordination: Coordination normal.      Gait: Gait normal.      Deep Tendon Reflexes: Reflexes normal.   Psychiatric:         Mood and Affect: Mood normal.          Additional Data:     Labs:  Results from last 7 days   Lab Units 02/19/24  0527 02/18/24  2248 02/18/24  1523   WBC Thousand/uL 3.32*  --  3.24*   HEMOGLOBIN g/dL 7.6*   < > 4.8*   HEMATOCRIT % 25.7*   < > 17.6*   PLATELETS Thousands/uL 45*  --  45*   NEUTROS PCT %  --   --  64   LYMPHS PCT %  --   --  20   MONOS PCT %  --   --  8   EOS PCT %  --   --  7*    < > = values in this interval not displayed.     Results from last 7 days   Lab Units 02/19/24  0527 02/18/24  1402   SODIUM mmol/L 141 138   POTASSIUM mmol/L 3.2* 3.7   CHLORIDE mmol/L 106 106   CO2 mmol/L 28 28   BUN mg/dL 19 25   CREATININE mg/dL 0.99 1.01   ANION GAP mmol/L 7 4   CALCIUM mg/dL 8.3* 8.8   ALBUMIN g/dL  --  2.9*   TOTAL BILIRUBIN mg/dL  --  1.90*   ALK PHOS U/L  --  219*   ALT U/L  --  42   AST U/L  --  50*   GLUCOSE RANDOM mg/dL 119 157*     Results from last 7 days   Lab Units 02/18/24  1402   INR  1.49*      Results from last 7 days   Lab Units 02/19/24  1100 02/19/24  0732 02/18/24  2122 02/18/24  1802   POC GLUCOSE mg/dl 166* 105 256* 113               Lines/Drains:  Invasive Devices       Peripheral Intravenous Line  Duration             Peripheral IV 02/18/24 Distal;Right;Upper;Ventral (anterior) Arm 1 day                          Imaging: No pertinent imaging reviewed.    Recent Cultures (last 7 days):   Results from last 7 days   Lab Units 02/18/24  1908   GRAM STAIN RESULT  No Polys or Bacteria seen   WOUND CULTURE  Culture too young- will reincubate       Last 24 Hours Medication List:   Current Facility-Administered Medications   Medication Dose Route Frequency Provider Last Rate    acetaminophen  650 mg Oral Q6H PRN Lou Smith MD      benzonatate  200 mg Oral TID PRN Lou Smith MD      furosemide  20 mg Oral BID (diuretic) Lou Smith MD      glimepiride  2 mg Oral BID Lou Smith MD      insulin glargine  30 Units Subcutaneous HS Lou Smith MD      insulin lispro  1-5 Units Subcutaneous TID AC Lou Smith MD      insulin lispro  1-5 Units Subcutaneous HS Lou Smith MD      levofloxacin  750 mg Oral Q24H Lou Smith MD      lidocaine (PF)    PRN AMOR Hewitt      multivitamin stress formula  1 tablet Oral Daily Lou Smith MD      ondansetron  4 mg Intravenous Q6H PRN Lou Smith MD      tamsulosin  0.4 mg Oral Daily With Dinner Lou Smith MD      traZODone  50 mg Oral HS Lou Smith MD          Today, Patient Was Seen By: Keyla Chauhan MD    **Please Note: This note may have been constructed using a voice recognition system.**

## 2024-02-20 ENCOUNTER — APPOINTMENT (INPATIENT)
Dept: GASTROENTEROLOGY | Facility: HOSPITAL | Age: 69
DRG: 378 | End: 2024-02-20
Payer: MEDICARE

## 2024-02-20 ENCOUNTER — APPOINTMENT (INPATIENT)
Dept: NON INVASIVE DIAGNOSTICS | Facility: HOSPITAL | Age: 69
DRG: 378 | End: 2024-02-20
Payer: MEDICARE

## 2024-02-20 ENCOUNTER — PREP FOR PROCEDURE (OUTPATIENT)
Dept: GASTROENTEROLOGY | Facility: CLINIC | Age: 69
End: 2024-02-20

## 2024-02-20 ENCOUNTER — APPOINTMENT (OUTPATIENT)
Dept: RADIOLOGY | Facility: HOSPITAL | Age: 69
DRG: 378 | End: 2024-02-20
Payer: MEDICARE

## 2024-02-20 ENCOUNTER — ANESTHESIA (INPATIENT)
Dept: GASTROENTEROLOGY | Facility: HOSPITAL | Age: 69
DRG: 378 | End: 2024-02-20
Payer: MEDICARE

## 2024-02-20 ENCOUNTER — ANESTHESIA EVENT (INPATIENT)
Dept: GASTROENTEROLOGY | Facility: HOSPITAL | Age: 69
DRG: 378 | End: 2024-02-20
Payer: MEDICARE

## 2024-02-20 DIAGNOSIS — Z87.19 HISTORY OF ESOPHAGEAL VARICES: Primary | ICD-10-CM

## 2024-02-20 LAB
ABO GROUP BLD BPU: NORMAL
ABO GROUP BLD BPU: NORMAL
ALBUMIN SERPL BCP-MCNC: 2.7 G/DL (ref 3.5–5)
ALP SERPL-CCNC: 233 U/L (ref 34–104)
ALT SERPL W P-5'-P-CCNC: 43 U/L (ref 7–52)
ANION GAP SERPL CALCULATED.3IONS-SCNC: 2 MMOL/L
AORTIC ROOT: 3.3 CM
AORTIC VALVE MEAN VELOCITY: 14.4 M/S
ASCENDING AORTA: 4.2 CM
AST SERPL W P-5'-P-CCNC: 56 U/L (ref 13–39)
AV AREA BY CONTINUOUS VTI: 3 CM2
AV AREA PEAK VELOCITY: 2.6 CM2
AV LVOT MEAN GRADIENT: 5 MMHG
AV LVOT PEAK GRADIENT: 9 MMHG
AV MEAN GRADIENT: 9 MMHG
AV PEAK GRADIENT: 16 MMHG
AV VALVE AREA: 2.72 CM2
AV VELOCITY RATIO: 0.76
BASOPHILS # BLD AUTO: 0.03 THOUSANDS/ÂΜL (ref 0–0.1)
BASOPHILS NFR BLD AUTO: 1 % (ref 0–1)
BILIRUB SERPL-MCNC: 2.34 MG/DL (ref 0.2–1)
BPU ID: NORMAL
BPU ID: NORMAL
BSA FOR ECHO PROCEDURE: 2.29 M2
BUN SERPL-MCNC: 15 MG/DL (ref 5–25)
CALCIUM ALBUM COR SERPL-MCNC: 9.3 MG/DL (ref 8.3–10.1)
CALCIUM SERPL-MCNC: 8.3 MG/DL (ref 8.4–10.2)
CHLORIDE SERPL-SCNC: 108 MMOL/L (ref 96–108)
CO2 SERPL-SCNC: 30 MMOL/L (ref 21–32)
CREAT SERPL-MCNC: 0.9 MG/DL (ref 0.6–1.3)
CROSSMATCH: NORMAL
CROSSMATCH: NORMAL
DOP CALC AO PEAK VEL: 2.02 M/S
DOP CALC AO VTI: 40.2 CM
DOP CALC LVOT AREA: 3.14 CM2
DOP CALC LVOT CARDIAC INDEX: 4.95 L/MIN/M2
DOP CALC LVOT CARDIAC OUTPUT: 11.33 L/MIN
DOP CALC LVOT DIAMETER: 2 CM
DOP CALC LVOT PEAK VEL VTI: 34.77 CM
DOP CALC LVOT PEAK VEL: 1.53 M/S
DOP CALC LVOT STROKE INDEX: 52.4 ML/M2
DOP CALC LVOT STROKE VOLUME: 109.18 CM3
DOP CALC MV VTI: 43.52 CM
E WAVE DECELERATION TIME: 247 MS
E/A RATIO: 1.15
EOSINOPHIL # BLD AUTO: 0.26 THOUSAND/ÂΜL (ref 0–0.61)
EOSINOPHIL NFR BLD AUTO: 10 % (ref 0–6)
ERYTHROCYTE [DISTWIDTH] IN BLOOD BY AUTOMATED COUNT: 19.9 % (ref 11.6–15.1)
FERRITIN SERPL-MCNC: 9 NG/ML (ref 24–336)
FRACTIONAL SHORTENING: 33 (ref 28–44)
GFR SERPL CREATININE-BSD FRML MDRD: 86 ML/MIN/1.73SQ M
GLUCOSE SERPL-MCNC: 186 MG/DL (ref 65–140)
GLUCOSE SERPL-MCNC: 54 MG/DL (ref 65–140)
GLUCOSE SERPL-MCNC: 55 MG/DL (ref 65–140)
GLUCOSE SERPL-MCNC: 55 MG/DL (ref 65–140)
GLUCOSE SERPL-MCNC: 57 MG/DL (ref 65–140)
GLUCOSE SERPL-MCNC: 67 MG/DL (ref 65–140)
GLUCOSE SERPL-MCNC: 72 MG/DL (ref 65–140)
GLUCOSE SERPL-MCNC: 88 MG/DL (ref 65–140)
GLUCOSE SERPL-MCNC: 91 MG/DL (ref 65–140)
GLUCOSE SERPL-MCNC: 94 MG/DL (ref 65–140)
HAPTOGLOB SERPL-MCNC: 20 MG/DL (ref 32–363)
HBV CORE AB SER QL: NORMAL
HBV CORE IGM SER QL: NORMAL
HBV SURFACE AG SER QL: NORMAL
HCT VFR BLD AUTO: 25 % (ref 36.5–49.3)
HCV AB SER QL: NORMAL
HGB BLD-MCNC: 7.4 G/DL (ref 12–17)
HIV 1+2 AB+HIV1 P24 AG SERPL QL IA: NORMAL
HIV 2 AB SERPL QL IA: NORMAL
HIV1 AB SERPL QL IA: NORMAL
HIV1 P24 AG SERPL QL IA: NORMAL
IMM GRANULOCYTES # BLD AUTO: 0.01 THOUSAND/UL (ref 0–0.2)
IMM GRANULOCYTES NFR BLD AUTO: 0 % (ref 0–2)
INR PPP: 1.42 (ref 0.84–1.19)
INTERVENTRICULAR SEPTUM IN DIASTOLE (PARASTERNAL SHORT AXIS VIEW): 1.3 CM
INTERVENTRICULAR SEPTUM: 1.3 CM (ref 0.6–1.1)
IRON SATN MFR SERPL: 7 % (ref 15–50)
IRON SERPL-MCNC: 24 UG/DL (ref 50–212)
LAAS-AP2: 33.3 CM2
LAAS-AP4: 37.3 CM2
LEFT ATRIUM SIZE: 5.2 CM
LEFT ATRIUM VOLUME (MOD BIPLANE): 156 ML
LEFT ATRIUM VOLUME INDEX (MOD BIPLANE): 68.1 ML/M2
LEFT INTERNAL DIMENSION IN SYSTOLE: 3.4 CM (ref 2.1–4)
LEFT VENTRICULAR INTERNAL DIMENSION IN DIASTOLE: 5.1 CM (ref 3.5–6)
LEFT VENTRICULAR POSTERIOR WALL IN END DIASTOLE: 1.3 CM
LEFT VENTRICULAR STROKE VOLUME: 73 ML
LVSV (TEICH): 73 ML
LYMPHOCYTES # BLD AUTO: 0.49 THOUSANDS/ÂΜL (ref 0.6–4.47)
LYMPHOCYTES NFR BLD AUTO: 18 % (ref 14–44)
MCH RBC QN AUTO: 23.3 PG (ref 26.8–34.3)
MCHC RBC AUTO-ENTMCNC: 29.6 G/DL (ref 31.4–37.4)
MCV RBC AUTO: 79 FL (ref 82–98)
MONOCYTES # BLD AUTO: 0.27 THOUSAND/ÂΜL (ref 0.17–1.22)
MONOCYTES NFR BLD AUTO: 10 % (ref 4–12)
MV E'TISSUE VEL-SEP: 8 CM/S
MV MEAN GRADIENT: 7 MMHG
MV PEAK A VEL: 1.28 M/S
MV PEAK E VEL: 147 CM/S
MV PEAK GRADIENT: 12 MMHG
MV STENOSIS PRESSURE HALF TIME: 72 MS
MV VALVE AREA BY CONTINUITY EQUATION: 2.51 CM2
MV VALVE AREA P 1/2 METHOD: 3.06
NEUTROPHILS # BLD AUTO: 1.67 THOUSANDS/ÂΜL (ref 1.85–7.62)
NEUTS SEG NFR BLD AUTO: 61 % (ref 43–75)
NRBC BLD AUTO-RTO: 0 /100 WBCS
PLATELET # BLD AUTO: 40 THOUSANDS/UL (ref 149–390)
POTASSIUM SERPL-SCNC: 3.6 MMOL/L (ref 3.5–5.3)
PROT SERPL-MCNC: 6 G/DL (ref 6.4–8.4)
PROTHROMBIN TIME: 17.2 SECONDS (ref 11.6–14.5)
RA PRESSURE ESTIMATED: 3 MMHG
RBC # BLD AUTO: 3.17 MILLION/UL (ref 3.88–5.62)
RIGHT ATRIUM AREA SYSTOLE A4C: 26.3 CM2
RIGHT VENTRICLE ID DIMENSION: 4.8 CM
RV PSP: 35 MMHG
SL CV LEFT ATRIUM LENGTH A2C: 6.5 CM
SL CV LV EF: 65
SL CV PED ECHO LEFT VENTRICLE DIASTOLIC VOLUME (MOD BIPLANE) 2D: 122 ML
SL CV PED ECHO LEFT VENTRICLE SYSTOLIC VOLUME (MOD BIPLANE) 2D: 49 ML
SODIUM SERPL-SCNC: 140 MMOL/L (ref 135–147)
TIBC SERPL-MCNC: 330 UG/DL (ref 250–450)
TR MAX PG: 32 MMHG
TR PEAK VELOCITY: 2.8 M/S
TRICUSPID ANNULAR PLANE SYSTOLIC EXCURSION: 1.9 CM
TRICUSPID VALVE PEAK REGURGITATION VELOCITY: 2.84 M/S
UIBC SERPL-MCNC: 306 UG/DL (ref 155–355)
UNIT DISPENSE STATUS: NORMAL
UNIT DISPENSE STATUS: NORMAL
UNIT PRODUCT CODE: NORMAL
UNIT PRODUCT CODE: NORMAL
UNIT PRODUCT VOLUME: 350 ML
UNIT PRODUCT VOLUME: 350 ML
UNIT RH: NORMAL
UNIT RH: NORMAL
WBC # BLD AUTO: 2.73 THOUSAND/UL (ref 4.31–10.16)

## 2024-02-20 PROCEDURE — 82948 REAGENT STRIP/BLOOD GLUCOSE: CPT

## 2024-02-20 PROCEDURE — 93306 TTE W/DOPPLER COMPLETE: CPT

## 2024-02-20 PROCEDURE — 87147 CULTURE TYPE IMMUNOLOGIC: CPT | Performed by: INTERNAL MEDICINE

## 2024-02-20 PROCEDURE — 87081 CULTURE SCREEN ONLY: CPT | Performed by: INTERNAL MEDICINE

## 2024-02-20 PROCEDURE — 82728 ASSAY OF FERRITIN: CPT | Performed by: STUDENT IN AN ORGANIZED HEALTH CARE EDUCATION/TRAINING PROGRAM

## 2024-02-20 PROCEDURE — 73720 MRI LWR EXTREMITY W/O&W/DYE: CPT

## 2024-02-20 PROCEDURE — 06L38CZ OCCLUSION OF ESOPHAGEAL VEIN WITH EXTRALUMINAL DEVICE, VIA NATURAL OR ARTIFICIAL OPENING ENDOSCOPIC: ICD-10-PCS | Performed by: INTERNAL MEDICINE

## 2024-02-20 PROCEDURE — 43270 EGD LESION ABLATION: CPT | Performed by: INTERNAL MEDICINE

## 2024-02-20 PROCEDURE — 80053 COMPREHEN METABOLIC PANEL: CPT | Performed by: STUDENT IN AN ORGANIZED HEALTH CARE EDUCATION/TRAINING PROGRAM

## 2024-02-20 PROCEDURE — 85610 PROTHROMBIN TIME: CPT | Performed by: STUDENT IN AN ORGANIZED HEALTH CARE EDUCATION/TRAINING PROGRAM

## 2024-02-20 PROCEDURE — 93306 TTE W/DOPPLER COMPLETE: CPT | Performed by: INTERNAL MEDICINE

## 2024-02-20 PROCEDURE — 85025 COMPLETE CBC W/AUTO DIFF WBC: CPT | Performed by: STUDENT IN AN ORGANIZED HEALTH CARE EDUCATION/TRAINING PROGRAM

## 2024-02-20 PROCEDURE — 83540 ASSAY OF IRON: CPT | Performed by: STUDENT IN AN ORGANIZED HEALTH CARE EDUCATION/TRAINING PROGRAM

## 2024-02-20 PROCEDURE — 83550 IRON BINDING TEST: CPT | Performed by: STUDENT IN AN ORGANIZED HEALTH CARE EDUCATION/TRAINING PROGRAM

## 2024-02-20 PROCEDURE — 99232 SBSQ HOSP IP/OBS MODERATE 35: CPT | Performed by: PHYSICIAN ASSISTANT

## 2024-02-20 PROCEDURE — A9585 GADOBUTROL INJECTION: HCPCS | Performed by: STUDENT IN AN ORGANIZED HEALTH CARE EDUCATION/TRAINING PROGRAM

## 2024-02-20 PROCEDURE — 0D578ZZ DESTRUCTION OF STOMACH, PYLORUS, VIA NATURAL OR ARTIFICIAL OPENING ENDOSCOPIC: ICD-10-PCS | Performed by: INTERNAL MEDICINE

## 2024-02-20 PROCEDURE — 43244 EGD VARICES LIGATION: CPT | Performed by: INTERNAL MEDICINE

## 2024-02-20 PROCEDURE — 99232 SBSQ HOSP IP/OBS MODERATE 35: CPT | Performed by: PODIATRIST

## 2024-02-20 RX ORDER — DEXTROSE MONOHYDRATE 25 G/50ML
INJECTION, SOLUTION INTRAVENOUS
Status: COMPLETED
Start: 2024-02-20 | End: 2024-02-20

## 2024-02-20 RX ORDER — DEXTROSE AND SODIUM CHLORIDE 5; .9 G/100ML; G/100ML
50 INJECTION, SOLUTION INTRAVENOUS CONTINUOUS
Status: DISPENSED | OUTPATIENT
Start: 2024-02-20 | End: 2024-02-20

## 2024-02-20 RX ORDER — GUAIFENESIN/DEXTROMETHORPHAN 100-10MG/5
10 SYRUP ORAL EVERY 4 HOURS PRN
Status: DISCONTINUED | OUTPATIENT
Start: 2024-02-20 | End: 2024-02-28 | Stop reason: HOSPADM

## 2024-02-20 RX ORDER — DEXTROSE MONOHYDRATE 25 G/50ML
12.5 INJECTION, SOLUTION INTRAVENOUS ONCE
Status: COMPLETED | OUTPATIENT
Start: 2024-02-20 | End: 2024-02-20

## 2024-02-20 RX ORDER — INSULIN GLARGINE 100 [IU]/ML
15 INJECTION, SOLUTION SUBCUTANEOUS
Status: DISCONTINUED | OUTPATIENT
Start: 2024-02-20 | End: 2024-02-21

## 2024-02-20 RX ORDER — LIDOCAINE HYDROCHLORIDE 10 MG/ML
INJECTION, SOLUTION EPIDURAL; INFILTRATION; INTRACAUDAL; PERINEURAL AS NEEDED
Status: DISCONTINUED | OUTPATIENT
Start: 2024-02-20 | End: 2024-02-20

## 2024-02-20 RX ORDER — PROPOFOL 10 MG/ML
INJECTION, EMULSION INTRAVENOUS AS NEEDED
Status: DISCONTINUED | OUTPATIENT
Start: 2024-02-20 | End: 2024-02-20

## 2024-02-20 RX ORDER — SODIUM CHLORIDE 9 MG/ML
INJECTION, SOLUTION INTRAVENOUS CONTINUOUS PRN
Status: DISCONTINUED | OUTPATIENT
Start: 2024-02-20 | End: 2024-02-20

## 2024-02-20 RX ORDER — GADOBUTROL 604.72 MG/ML
11 INJECTION INTRAVENOUS
Status: COMPLETED | OUTPATIENT
Start: 2024-02-20 | End: 2024-02-20

## 2024-02-20 RX ADMIN — PROPOFOL 30 MG: 10 INJECTION, EMULSION INTRAVENOUS at 15:57

## 2024-02-20 RX ADMIN — PROPOFOL 25 MG: 10 INJECTION, EMULSION INTRAVENOUS at 16:02

## 2024-02-20 RX ADMIN — DEXTROSE MONOHYDRATE 50 ML: 25 INJECTION, SOLUTION INTRAVENOUS at 08:08

## 2024-02-20 RX ADMIN — PROPOFOL 50 MG: 10 INJECTION, EMULSION INTRAVENOUS at 16:06

## 2024-02-20 RX ADMIN — TRAZODONE HYDROCHLORIDE 50 MG: 50 TABLET ORAL at 21:01

## 2024-02-20 RX ADMIN — GADOBUTROL 11 ML: 604.72 INJECTION INTRAVENOUS at 23:00

## 2024-02-20 RX ADMIN — FUROSEMIDE 20 MG: 20 TABLET ORAL at 17:45

## 2024-02-20 RX ADMIN — DEXTROSE AND SODIUM CHLORIDE 50 ML/HR: 5; .9 INJECTION, SOLUTION INTRAVENOUS at 13:52

## 2024-02-20 RX ADMIN — LIDOCAINE HYDROCHLORIDE 100 MG: 10 INJECTION, SOLUTION EPIDURAL; INFILTRATION; INTRACAUDAL; PERINEURAL at 15:54

## 2024-02-20 RX ADMIN — TAMSULOSIN HYDROCHLORIDE 0.4 MG: 0.4 CAPSULE ORAL at 17:45

## 2024-02-20 RX ADMIN — PROPOFOL 50 MG: 10 INJECTION, EMULSION INTRAVENOUS at 16:09

## 2024-02-20 RX ADMIN — PROPOFOL 25 MG: 10 INJECTION, EMULSION INTRAVENOUS at 16:01

## 2024-02-20 RX ADMIN — PROPOFOL 20 MG: 10 INJECTION, EMULSION INTRAVENOUS at 15:59

## 2024-02-20 RX ADMIN — LEVOFLOXACIN 750 MG: 750 TABLET, FILM COATED ORAL at 20:51

## 2024-02-20 RX ADMIN — PROPOFOL 100 MG: 10 INJECTION, EMULSION INTRAVENOUS at 15:55

## 2024-02-20 RX ADMIN — SODIUM CHLORIDE: 0.9 INJECTION, SOLUTION INTRAVENOUS at 15:29

## 2024-02-20 RX ADMIN — DEXTROSE MONOHYDRATE 12.5 G: 25 INJECTION, SOLUTION INTRAVENOUS at 17:07

## 2024-02-20 RX ADMIN — DEXTROSE MONOHYDRATE 25 ML: 25 INJECTION, SOLUTION INTRAVENOUS at 11:57

## 2024-02-20 RX ADMIN — GUAIFENESIN AND DEXTROMETHORPHAN 10 ML: 100; 10 SYRUP ORAL at 22:18

## 2024-02-20 NOTE — PROGRESS NOTES
[facility]  Progress Note  Name: Isael Avendano I  MRN: [id number]  Unit/Bed#: NW8 861-02 I Date of Admission: 2/18/2024   Date of Service: 2/20/2024 I Hospital Day: 2    Assessment/Plan   * Symptomatic anemia  Assessment & Plan  GI and Heme/Onc following. Suspected multifactorial given splenomegaly and possible GI source  S/p multiple blood transfusions this admission  Plan for EGD today  CBC in AM    DM2 (diabetes mellitus, type 2) (HCC)  Assessment & Plan  Lab Results   Component Value Date    HGBA1C 7.3 (H) 08/18/2023       Recent Labs     02/20/24  0804 02/20/24  0833 02/20/24  1138 02/20/24  1300   POCGLU 67 94 57* 72         Blood Sugar Average: Last 72 hrs:  (P) 116  Lantus was decreased from 40 units to 30 units last night. Patient hypoglycemic today. He is NPO for EGD. Will start on D5NS for few hours for EGD  Discontinue Amaryl  Decrease Lantus from 30 to 15 units tonight  Continue SSI and glucose checks  Hypoglycemia protocol       Pancytopenia (HCC)  Assessment & Plan  Seen by hematology in the past felt to be due to hypersplenism with sequestration   Appreciate ongoing Heme/Onc recs here    Diabetic ulcer of left foot associated with type 2 diabetes mellitus (HCC)  Assessment & Plan  Lab Results   Component Value Date    HGBA1C 7.3 (H) 08/18/2023       Recent Labs     02/20/24  0804 02/20/24  0833 02/20/24  1138 02/20/24  1300   POCGLU 67 94 57* 72       Patient has been on Levquin started in [place], was told he had contracted a bacteria from the water?   Unable to see records in [facility] from [facility]    Phone number [phone] Dr. Fong.   Podiatry following     Cirrhosis, alcoholic (HCC)  Assessment & Plan  GI following, plan for EGD today as above  S/p IR paracentesis  Currently on 20 mg lasix po daily, monitor renal function     Thrombocytopenia (HCC)  Assessment & Plan  Platelets are 40, Heme/Onc following       Stage 3a chronic kidney disease  ([facility])  Assessment & Plan  Lab Results   Component Value Date    EGFR 76 02/19/2024    EGFR 75 02/18/2024    EGFR 67 12/21/2023    CREATININE 0.99 02/19/2024    CREATININE 1.01 02/18/2024    CREATININE 1.10 12/21/2023   Monitor renal function    MEG (obstructive sleep apnea)  Assessment & Plan  Patient has brought and can use his home cpap    Murmur  Assessment & Plan  Noted, echo pending        Essential hypertension  Assessment & Plan  BP stable, on PO Lasix as above    Urinary retention  Assessment & Plan  Continue flomax             VTE Pharmacologic Prophylaxis: VTE Score: 4 Moderate Risk (Score 3-4) - Pharmacological DVT Prophylaxis Contraindicated. Sequential Compression Devices Ordered.    Mobility:   Basic Mobility Inpatient Raw Score: 23  JH-HLM Goal: 7: Walk 25 feet or more  JH-HLM Achieved: 7: Walk 25 feet or more  HLM Goal achieved. Continue to encourage appropriate mobility.    Patient Centered Rounds: I performed bedside rounds with nursing staff today.  D/w RNs Silvia   Discussions with Specialists or Other Care Team Provider:     Education and Discussions with Family / Patient: Updated  (wife) at bedside.    Total Time Spent on Date of Encounter in care of patient: 40 mins. This time was spent on one or more of the following: performing physical exam; counseling and coordination of care; obtaining or reviewing history; documenting in the medical record; reviewing/ordering tests, medications or procedures; communicating with other healthcare professionals and discussing with patient's family/caregivers.    Current Length of Stay: 2 day(s)  Current Patient Status: Inpatient   Certification Statement: The patient will continue to require additional inpatient hospital stay due to EGD, hypoglycemia  Discharge Plan:  pending Heme/Onc and GI clearance    Code Status: Level 1 - Full Code    Subjective:   Mr. Avendano reports feeling symptomatic with low blood sugar this AM and asks when his  EGD is getting done as he has been NPO. Denies CP, SOB. He reports having some leg swelling and getting IV Lasix yesterday    Objective:     Vitals:   Temp (24hrs), Av.8 °F (36.6 °C), Min:97.6 °F (36.4 °C), Max:98.2 °F (36.8 °C)    Temp:  [97.6 °F (36.4 °C)-98.2 °F (36.8 °C)] 97.7 °F (36.5 °C)  HR:  [] 90  Resp:  [16-18] 17  BP: (116-148)/(52-70) 116/52  SpO2:  [96 %-100 %] 96 %  Body mass index is 33.75 kg/m².     Input and Output Summary (last 24 hours):     Intake/Output Summary (Last 24 hours) at 2024 1338  Last data filed at 2024 0934  Gross per 24 hour   Intake 360 ml   Output 0 ml   Net 360 ml       Physical Exam:   Physical Exam  Vitals reviewed.   Constitutional:       Appearance: He is obese.      Comments: Patient seen lying in bed, NAD   Cardiovascular:      Rate and Rhythm: Normal rate and regular rhythm.   Pulmonary:      Effort: Pulmonary effort is normal. No respiratory distress.      Breath sounds: Normal breath sounds.   Abdominal:      General: Bowel sounds are normal. There is distension.      Tenderness: There is no abdominal tenderness.   Musculoskeletal:      Right lower leg: Edema present.      Left lower leg: Edema present.   Skin:     General: Skin is warm.   Neurological:      Mental Status: He is alert and oriented to person, place, and time.   Psychiatric:         Mood and Affect: Mood normal.          Additional Data:     Labs:  Results from last 7 days   Lab Units 24  0902   WBC Thousand/uL 2.73*   HEMOGLOBIN g/dL 7.4*   HEMATOCRIT % 25.0*   PLATELETS Thousands/uL 40*   NEUTROS PCT % 61   LYMPHS PCT % 18   MONOS PCT % 10   EOS PCT % 10*     Results from last 7 days   Lab Units 24  0527 24  1402   SODIUM mmol/L 141 138   POTASSIUM mmol/L 3.2* 3.7   CHLORIDE mmol/L 106 106   CO2 mmol/L 28 28   BUN mg/dL 19 25   CREATININE mg/dL 0.99 1.01   ANION GAP mmol/L 7 4   CALCIUM mg/dL 8.3* 8.8   ALBUMIN g/dL  --  2.9*   TOTAL BILIRUBIN mg/dL  --  1.90*   ALK  PHOS U/L  --  219*   ALT U/L  --  42   AST U/L  --  50*   GLUCOSE RANDOM mg/dL 119 157*     Results from last 7 days   Lab Units 02/20/24  0902   INR  1.42*     Results from last 7 days   Lab Units 02/20/24  1300 02/20/24  1138 02/20/24  0833 02/20/24  0804 02/20/24  0737 02/19/24  2031 02/19/24  1643 02/19/24  1100 02/19/24  0732 02/18/24  2122 02/18/24  1802   POC GLUCOSE mg/dl 72 57* 94 67 55* 175* 116 166* 105 256* 113               Lines/Drains:  Invasive Devices       Peripheral Intravenous Line  Duration             Peripheral IV 02/18/24 Distal;Right;Upper;Ventral (anterior) Arm 1 day                          Imaging: Reviewed radiology reports from this admission including: MRI abdomen/MRCP and ultrasound(s)    Recent Cultures (last 7 days):   Results from last 7 days   Lab Units 02/19/24  1537 02/18/24  1908   GRAM STAIN RESULT  No Polys or Bacteria seen No Polys or Bacteria seen   WOUND CULTURE   --  Culture results to follow.   BODY FLUID CULTURE, STERILE  No growth  --        Last 24 Hours Medication List:   Current Facility-Administered Medications   Medication Dose Route Frequency Provider Last Rate    acetaminophen  650 mg Oral Q6H PRN Lou Smith MD      benzonatate  200 mg Oral TID PRN Lou Smith MD      dextrose 5 % and sodium chloride 0.9 %  50 mL/hr Intravenous Continuous Nabil Waters PA-C      furosemide  20 mg Oral BID (diuretic) Lou Smith MD      insulin glargine  15 Units Subcutaneous HS Nabil Waters PA-C      insulin lispro  1-5 Units Subcutaneous TID AC Lou Smith MD      insulin lispro  1-5 Units Subcutaneous HS Lou Smith MD      levofloxacin  750 mg Oral Q24H Lou Smith MD      multivitamin stress formula  1 tablet Oral Daily Lou Smith MD      ondansetron  4 mg Intravenous Q6H PRN Lou Smith MD      tamsulosin  0.4 mg Oral Daily With Dinner Lou Smith MD      traZODone  50 mg Oral HS Lou Smith MD          Today,  Patient Was Seen By: Nabil Waters PA-C    **Please Note: This note may have been constructed using a voice recognition system.**     No

## 2024-02-20 NOTE — QUICK NOTE
Was contacted by patient's RN that patient had BPA for Sepsis Time Zero given HR (94), leukopenia (WBCs 2.73),  T bili (2.34), and thrombocytopenia (plt 40). Patient with known pancytopenia, being followed here by Heme/Onc as well as known cirrhosis being followed by GI. Being evaluated by GI with EGD today and Heme/Onc also suspects splenomegaly contributing. Would not give weight based IVF bolus at this time.

## 2024-02-20 NOTE — CASE MANAGEMENT
Case Management Assessment & Discharge Planning Note    Patient name Isael Avendano  Location 8 861/NW8 861-02 MRN [id number]  : 1955 Date 2024       Current Admission Date: 2024  Current Admission Diagnosis:Symptomatic anemia   Patient Active Problem List    Diagnosis Date Noted    Symptomatic anemia 2024    Perineal abscess 2023    Pyogenic arthritis of left shoulder region (HCC) 2023    Urinary retention 2023    Stage 3a chronic kidney disease (HCC) 06/15/2023    Murmur 06/15/2023    Postoperative infection of surgical wound 2023    Ulcer of left foot, limited to breakdown of skin ([facility]) 2023    Aftercare following left shoulder joint replacement surgery 2023    S/P reverse total shoulder arthroplasty, left 2023    Obesity, morbid ([facility]) 2022    Post-traumatic osteoarthritis of left shoulder 2022    Morbid obesity ([facility]) 2022    Cellulitis 2022    Diabetic ulcer of left foot associated with type 2 diabetes mellitus ([facility]) 2022    Leukopenia 2022    Insomnia 2020    Elevated troponin 2020    Chest pressure 2020    Alcohol abuse 2020    DM2 (diabetes mellitus, type 2) ([facility]) 2020    Essential hypertension 2020    ABILIO (acute kidney injury) ([facility]) 2020    Cholelithiasis 2020    Cirrhosis, alcoholic ([facility]) 2020    MEG (obstructive sleep apnea) 2020    Pancytopenia ([facility]) 2020    Thrombocytopenia ([facility]) 2020      LOS (days): 2  Geometric Mean LOS (GMLOS) (days): 2.8  Days to GMLOS:1     OBJECTIVE:    Risk of Unplanned Readmission Score: 22.66         Current admission status: Inpatient       Preferred Pharmacy:   [id number] - [place] - [address]  [address] [place] [address]  Phone: [phone] Fax: [phone]    Primary Care Provider: Collin Marcos MD    Primary Insurance: MEDICARE  Secondary Insurance:  AET[employer]    ASSESSMENT:  Active Health Care Proxies       Kaylah Avendano [facility] Care Agent - Spouse   Primary Phone: [phone] (Mobile)  Home Phone: [phone]                           Readmission Root Cause  30 Day Readmission: No    Patient Information  Admitted from:: Home  Mental Status: Alert  During Assessment patient was accompanied by: Spouse  Assessment information provided by:: Patient  Primary Caregiver: Self  Support Systems: Spouse/significant other  County of Residence: [place]  What city do you live in?: [place]  Home entry access options. Select all that apply.: Stairs  Number of steps to enter home.: 1  Do the steps have railings?: No  Type of Current Residence: [facility]  Living Arrangements: Lives w/ Spouse/significant other  Is patient a ?: No    Activities of Daily Living Prior to Admission  Functional Status: Independent  Completes ADLs independently?: Yes  Ambulates independently?: Yes  Does patient use assisted devices?: No  Does patient currently own DME?: No  Does patient have a history of Outpatient Therapy (PT/OT)?: No  Does the patient have a history of Short-Term Rehab?: No  Does patient have a history of HHC?: Yes  Does patient currently have HHC?: No         Patient Information Continued  Income Source: Employed  Does patient have prescription coverage?: Yes  Does patient receive dialysis treatments?: No  Does patient have a history of substance abuse?: No  Does patient have a history of Mental Health Diagnosis?: No         Means of Transportation  Means of Transport to Appts:: Drives Self      Social Determinants of Health (SDOH)      Flowsheet Row Most Recent Value   Housing Stability    In the last 12 months, was there a time when you were not able to pay the mortgage or rent on time? N   In the last 12 months, how many places have you lived? 1   In the last 12 months, was there a time when you did not have a steady place to sleep or slept in a shelter (including now)? N    Transportation Needs    In the past 12 months, has lack of transportation kept you from medical appointments or from getting medications? no   In the past 12 months, has lack of transportation kept you from meetings, work, or from getting things needed for daily living? No   Food Insecurity    Within the past 12 months, you worried that your food would run out before you got the money to buy more. Never true   Within the past 12 months, the food you bought just didn't last and you didn't have money to get more. Never true   Utilities    In the past 12 months has the electric, gas, oil, or water company threatened to shut off services in your home? No            DISCHARGE DETAILS:    Discharge planning discussed with:: Patient and pt's spouse at bedside.  Freedom of Choice: Yes  Comments - Freedom of Choice: No rehab needs at this time.  CM contacted family/caregiver?: Yes  Were Treatment Team discharge recommendations reviewed with patient/caregiver?: Yes  Did patient/caregiver verbalize understanding of patient care needs?: Yes  Were patient/caregiver advised of the risks associated with not following Treatment Team discharge recommendations?: Yes    Contacts  Patient Contacts: Kaylah Avendano (Spouse)  Relationship to Patient:: Family  Contact Method: In Person  Reason/Outcome: Discharge Planning, Continuity of Care    Requested Home Health Care         Is the patient interested in HHC at discharge?: No    DME Referral Provided  Referral made for DME?: No    Other Referral/Resources/Interventions Provided:  Referral Comments: No referrals made at this time. No rehab needs anticipated.    Additional Comments: CM met with pt and pt's spouse at bedside and introduced self and role. Pt reports he resides with his spouse in a 1-story [facility] Pt denied using or owning DME. Pt denied any D&A or MH hx. Pt denied hx of STR. Pt reports having HHC for SN in April. CM department to continue to follow for any discharge  planning needs throughout this admission.

## 2024-02-20 NOTE — ASSESSMENT & PLAN NOTE
"CARDIOLOGY OUTPATIENT FOLLOWUP    PCP: Ron Hernandes GI following, plan for EGD today as above  S/p IR paracentesis  Currently on 20 mg lasix po daily, monitor renal function    

## 2024-02-20 NOTE — ASSESSMENT & PLAN NOTE
Lab Results   Component Value Date    EGFR 76 02/19/2024    EGFR 75 02/18/2024    EGFR 67 12/21/2023    CREATININE 0.99 02/19/2024    CREATININE 1.01 02/18/2024    CREATININE 1.10 12/21/2023   Monitor renal function

## 2024-02-20 NOTE — ANESTHESIA PREPROCEDURE EVALUATION
Procedure:  EGD    Relevant Problems   CARDIO   (+) Essential hypertension   (+) Murmur      ENDO   (+) DM2 (diabetes mellitus, type 2) (HCC)      GI/HEPATIC   (+) Cirrhosis, alcoholic (HCC)      /RENAL   (+) ABILIO (acute kidney injury) (HCC)   (+) Stage 3a chronic kidney disease (HCC)      HEMATOLOGY   (+) Pancytopenia (HCC)   (+) Symptomatic anemia   (+) Thrombocytopenia (HCC)      MUSCULOSKELETAL   (+) Post-traumatic osteoarthritis of left shoulder   (+) Pyogenic arthritis of left shoulder region (HCC)      PULMONARY   (+) MEG (obstructive sleep apnea)        Physical Exam    Airway    Mallampati score: I  TM Distance: >3 FB  Neck ROM: full     Dental        Cardiovascular  Cardiovascular exam normal    Pulmonary  Pulmonary exam normal     Other Findings        Anesthesia Plan  ASA Score- 3     Anesthesia Type- IV sedation with anesthesia with ASA Monitors.         Additional Monitors:     Airway Plan:            Plan Factors-Exercise tolerance (METS): >4 METS.    Chart reviewed. EKG reviewed.  Existing labs reviewed. Patient summary reviewed.    Patient is not a current smoker.  Patient did not smoke on day of surgery.    Obstructive sleep apnea risk education given perioperatively.        Induction- intravenous.    Postoperative Plan- Plan for postoperative opioid use.     Informed Consent- Anesthetic plan and risks discussed with patient.  I personally reviewed this patient with the CRNA. Discussed and agreed on the Anesthesia Plan with the CRNA..

## 2024-02-20 NOTE — ASSESSMENT & PLAN NOTE
Lab Results   Component Value Date    HGBA1C 7.3 (H) 08/18/2023       Recent Labs     02/20/24  0804 02/20/24  0833 02/20/24  1138 02/20/24  1300   POCGLU 67 94 57* 72       Patient has been on Levquin started in [place], was told he had contracted a bacteria from the water?   Unable to see records in [facility] from [facility]    Phone number [phone] Dr. Fong.   Podiatry following

## 2024-02-20 NOTE — ASSESSMENT & PLAN NOTE
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

## 2024-02-20 NOTE — ANESTHESIA POSTPROCEDURE EVALUATION
Post-Op Assessment Note    CV Status:  Stable  Pain Score: 0    Pain management: adequate       Mental Status:  Alert and awake   Hydration Status:  Euvolemic   PONV Controlled:  Controlled   Airway Patency:  Patent     Post Op Vitals Reviewed: Yes    No anethesia notable event occurred.    Staff: CRNA               /61 (02/20/24 1617)    Temp (!) 97.4 °F (36.3 °C) (02/20/24 1617)    Pulse 92 (02/20/24 1617)   Resp 22 (02/20/24 1617)    SpO2 97 % (02/20/24 1617)

## 2024-02-20 NOTE — PLAN OF CARE
Problem: Potential for Falls  Goal: Patient will remain free of falls  Description: INTERVENTIONS:  - Educate patient/family on patient safety including physical limitations  - Instruct patient to call for assistance with activity   - Consult OT/PT to assist with strengthening/mobility   - Keep Call bell within reach  - Keep bed low and locked with side rails adjusted as appropriate  - Keep care items and personal belongings within reach  - Initiate and maintain comfort rounds  - Make Fall Risk Sign visible to staff  - Offer Toileting every 2 Hours, in advance of need  - Initiate/Maintain bed alarm  - Obtain necessary fall risk management equipment  - Apply yellow socks and bracelet for high fall risk patients  - Consider moving patient to room near nurses station  Outcome: Progressing     Problem: RESPIRATORY - ADULT  Goal: Achieves optimal ventilation and oxygenation  Description: INTERVENTIONS:  - Assess for changes in respiratory status  - Assess for changes in mentation and behavior  - Position to facilitate oxygenation and minimize respiratory effort  - Oxygen administered by appropriate delivery if ordered  - Initiate smoking cessation education as indicated  - Encourage broncho-pulmonary hygiene including cough, deep breathe, Incentive Spirometry  - Assess the need for suctioning and aspirate as needed  - Assess and instruct to report SOB or any respiratory difficulty  - Respiratory Therapy support as indicated  Outcome: Progressing     Problem: GASTROINTESTINAL - ADULT  Goal: Minimal or absence of nausea and/or vomiting  Description: INTERVENTIONS:  - Administer IV fluids if ordered to ensure adequate hydration  - Maintain NPO status until nausea and vomiting are resolved  - Nasogastric tube if ordered  - Administer ordered antiemetic medications as needed  - Provide nonpharmacologic comfort measures as appropriate  - Advance diet as tolerated, if ordered  - Consider nutrition services referral to assist  patient with adequate nutrition and appropriate food choices  Outcome: Progressing  Goal: Maintains or returns to baseline bowel function  Description: INTERVENTIONS:  - Assess bowel function  - Encourage oral fluids to ensure adequate hydration  - Administer IV fluids if ordered to ensure adequate hydration  - Administer ordered medications as needed  - Encourage mobilization and activity  - Consider nutritional services referral to assist patient with adequate nutrition and appropriate food choices  Outcome: Progressing  Goal: Maintains adequate nutritional intake  Description: INTERVENTIONS:  - Monitor percentage of each meal consumed  - Identify factors contributing to decreased intake, treat as appropriate  - Assist with meals as needed  - Monitor I&O, weight, and lab values if indicated  - Obtain nutrition services referral as needed  Outcome: Progressing  Goal: Oral mucous membranes remain intact  Description: INTERVENTIONS  - Assess oral mucosa and hygiene practices  - Implement preventative oral hygiene regimen  - Implement oral medicated treatments as ordered  - Initiate Nutrition services referral as needed  Outcome: Progressing     Problem: SKIN/TISSUE INTEGRITY - ADULT  Goal: Incision(s), wounds(s) or drain site(s) healing without S/S of infection  Description: INTERVENTIONS  - Assess and document dressing, incision, wound bed, drain sites and surrounding tissue  - Provide patient and family education  - Perform skin care/dressing changes  Outcome: Progressing     Problem: HEMATOLOGIC - ADULT  Goal: Maintains hematologic stability  Description: INTERVENTIONS  - Assess for signs and symptoms of bleeding or hemorrhage  - Monitor labs  - Administer supportive blood products/factors as ordered and appropriate  Outcome: Progressing     Problem: PAIN - ADULT  Goal: Verbalizes/displays adequate comfort level or baseline comfort level  Description: Interventions:  - Encourage patient to monitor pain and request  assistance  - Assess pain using appropriate pain scale  - Administer analgesics based on type and severity of pain and evaluate response  - Implement non-pharmacological measures as appropriate and evaluate response  - Consider cultural and social influences on pain and pain management  - Notify physician/advanced practitioner if interventions unsuccessful or patient reports new pain  Outcome: Progressing     Problem: INFECTION - ADULT  Goal: Absence or prevention of progression during hospitalization  Description: INTERVENTIONS:  - Assess and monitor for signs and symptoms of infection  - Monitor lab/diagnostic results  - Monitor all insertion sites, i.e. indwelling lines, tubes, and drains  - Monitor endotracheal if appropriate and nasal secretions for changes in amount and color  - [place] appropriate cooling/warming therapies per order  - Administer medications as ordered  - Instruct and encourage patient and family to use good hand hygiene technique  - Identify and instruct in appropriate isolation precautions for identified infection/condition  Outcome: Progressing     Problem: SAFETY ADULT  Goal: Patient will remain free of falls  Description: INTERVENTIONS:  - Educate patient/family on patient safety including physical limitations  - Instruct patient to call for assistance with activity   - Consult OT/PT to assist with strengthening/mobility   - Keep Call bell within reach  - Keep bed low and locked with side rails adjusted as appropriate  - Keep care items and personal belongings within reach  - Initiate and maintain comfort rounds  - Make Fall Risk Sign visible to staff  - Offer Toileting every 2 Hours, in advance of need  - Initiate/Maintain bed alarm  - Obtain necessary fall risk management equipment  - Apply yellow socks and bracelet for high fall risk patients  - Consider moving patient to room near nurses station  Outcome: Progressing  Goal: Maintain or return to baseline ADL function  Description:  INTERVENTIONS:  -  Assess patient's ability to carry out ADLs; assess patient's baseline for ADL function and identify physical deficits which impact ability to perform ADLs (bathing, care of mouth/teeth, toileting, grooming, dressing, etc.)  - Assess/evaluate cause of self-care deficits   - Assess range of motion  - Assess patient's mobility; develop plan if impaired  - Assess patient's need for assistive devices and provide as appropriate  - Encourage maximum independence but intervene and supervise when necessary  - Involve family in performance of ADLs  - Assess for home care needs following discharge   - Consider OT consult to assist with ADL evaluation and planning for discharge  - Provide patient education as appropriate  Outcome: Progressing  Goal: Maintains/Returns to pre admission functional level  Description: INTERVENTIONS:  - Perform AM-PAC 6 Click Basic Mobility/ Daily Activity assessment daily.  - Set and communicate daily mobility goal to care team and patient/family/caregiver.   - Collaborate with rehabilitation services on mobility goals if consulted  - Perform Range of Motion 4 times a day.  - Reposition patient every 2 hours.  - Dangle patient 4 times a day  - Stand patient 3 times a day  - Ambulate patient 3 times a day  - Out of bed to chair 3 times a day   - Out of bed for meals 3 times a day  - Out of bed for toileting  - Record patient progress and toleration of activity level   Outcome: Progressing     Problem: DISCHARGE PLANNING  Goal: Discharge to home or other facility with appropriate resources  Description: INTERVENTIONS:  - Identify barriers to discharge w/patient and caregiver  - Arrange for needed discharge resources and transportation as appropriate  - Identify discharge learning needs (meds, wound care, etc.)  - Arrange for interpretive services to assist at discharge as needed  - Refer to Case Management Department for coordinating discharge planning if the patient needs  post-hospital services based on physician/advanced practitioner order or complex needs related to functional status, cognitive ability, or social support system  Outcome: Progressing     Problem: Knowledge Deficit  Goal: Patient/family/caregiver demonstrates understanding of disease process, treatment plan, medications, and discharge instructions  Description: Complete learning assessment and assess knowledge base.  Interventions:  - Provide teaching at level of understanding  - Provide teaching via preferred learning methods  Outcome: Progressing     Problem: Nutrition/Hydration-ADULT  Goal: Nutrient/Hydration intake appropriate for improving, restoring or maintaining nutritional needs  Description: Monitor and assess patient's nutrition/hydration status for malnutrition. Collaborate with interdisciplinary team and initiate plan and interventions as ordered.  Monitor patient's weight and dietary intake as ordered or per policy. Utilize nutrition screening tool and intervene as necessary. Determine patient's food preferences and provide high-protein, high-caloric foods as appropriate.     INTERVENTIONS:  - Monitor oral intake, urinary output, labs, and treatment plans  - Assess nutrition and hydration status and recommend course of action  - Evaluate amount of meals eaten  - Assist patient with eating if necessary   - Allow adequate time for meals  - Recommend/ encourage appropriate diets, oral nutritional supplements, and vitamin/mineral supplements  - Order, calculate, and assess calorie counts as needed  - Recommend, monitor, and adjust tube feedings and TPN/PPN based on assessed needs  - Assess need for intravenous fluids  - Provide specific nutrition/hydration education as appropriate  - Include patient/family/caregiver in decisions related to nutrition  Outcome: Progressing

## 2024-02-20 NOTE — ASSESSMENT & PLAN NOTE
Lab Results   Component Value Date    HGBA1C 7.3 (H) 08/18/2023       Recent Labs     02/20/24  0804 02/20/24  0833 02/20/24  1138 02/20/24  1300   POCGLU 67 94 57* 72         Blood Sugar Average: Last 72 hrs:  (P) 116  Lantus was decreased from 40 units to 30 units last night. Patient hypoglycemic today. He is NPO for EGD. Will start on D5NS for few hours for EGD  Discontinue Amaryl  Decrease Lantus from 30 to 15 units tonight  Continue SSI and glucose checks  Hypoglycemia protocol

## 2024-02-20 NOTE — ASSESSMENT & PLAN NOTE
GI and Heme/Onc following. Suspected multifactorial given splenomegaly and possible GI source  S/p multiple blood transfusions this admission  Plan for EGD today  CBC in AM

## 2024-02-20 NOTE — PROGRESS NOTES
"[facility] Podiatry - Progress Note  Patient: Isael Avendano 68 y.o. male   MRN: [id number]  PCP: Collin Marcos MD  Unit/Bed#: NW8 861-02 Encounter: [id number]  Date Of Visit: 24    ASSESSMENT:    Isael Avendano is a 68 y.o. male with:    Left 4th interdigital wounds   Type II diabetes mellitus   CKD stage III     PLAN:    Dressings changed at bedside today. Wounds appear stable.   Left foot radiograph images reviewed. Agree with the final read: There is focal osteopenia and poor cortical definition of the fifth middle and distal phalanx. Cannot exclude osteomyelitis.   Follow up MRI to further assess for osteomyelitis. Pending results, patient may require podiatric surgical intervention vs. local wound care. No concrete OR plans at this time.   Continue local wound care of maxorb, DSD.   Wound care orders placed. Appreciate nursing assistance with dressing changes.  Elevation on green foam wedges or pillows when non-ambulatory.  Rest of care per primary team.    Weight bearing status: Weightbearing as tolerated       SUBJECTIVE:     The patient was seen, evaluated, and assessed at bedside today. The patient was awake, alert, and in no acute distress. No acute events overnight. The patient reports he is doing well today. Patient denies N/V/F/chills/SOB/CP.      OBJECTIVE:     Vitals:   /61   Pulse 94   Temp (!) 97.3 °F (36.3 °C) (Tympanic)   Resp 18   Ht 5' 11\" (1.803 m)   Wt 110 kg (242 lb)   SpO2 98%   BMI 33.75 kg/m²     Temp (24hrs), Av.7 °F (36.5 °C), Min:97.3 °F (36.3 °C), Max:98.2 °F (36.8 °C)      Physical Exam:     General:  Alert, cooperative, and in no distress.  Lower extremity exam:  Cardiovascular status at baseline.  Neurological status at baseline.  Musculoskeletal status at baseline . No calf tenderness noted.     Lower extremity wound(s) as noted below:    Wound #: 1  Location: Left 4th digit  Length 1.0 cm: Width 1.0 cm: Depth 0.2 cm:   Deepest Tissue Noted in Base: Capsule  Probe to " "Bone: No  Peripheral Skin Description: Attached  Granulation: 0% Fibrotic Tissue: 100% Necrotic Tissue: 0%   Drainage Amount: moderate serous  Signs of Infection: No       Wound #: 2  Location: Left 5th digit  Length 0.8 cm: Width 0.4 cm: Depth 0.1 cm:   Deepest Tissue Noted in Base: subcutaneous  Probe to Bone: No  Peripheral Skin Description: Attached  Granulation: 100% Fibrotic Tissue: 0% Necrotic Tissue: 0%   Drainage Amount: minimal  Signs of Infection: No    Clinical Images 02/20/24:              Additional Data:     Labs:    Results from last 7 days   Lab Units 02/20/24  0902   WBC Thousand/uL 2.73*   HEMOGLOBIN g/dL 7.4*   HEMATOCRIT % 25.0*   PLATELETS Thousands/uL 40*   NEUTROS PCT % 61   LYMPHS PCT % 18   MONOS PCT % 10   EOS PCT % 10*     Results from last 7 days   Lab Units 02/20/24  0902   POTASSIUM mmol/L 3.6   CHLORIDE mmol/L 108   CO2 mmol/L 30   BUN mg/dL 15   CREATININE mg/dL 0.90   CALCIUM mg/dL 8.3*   ALK PHOS U/L 233*   ALT U/L 43   AST U/L 56*     Results from last 7 days   Lab Units 02/20/24  0902   INR  1.42*       * I Have Reviewed All Lab Data Listed Above.    Recent Cultures (last 7 days):     Results from last 7 days   Lab Units 02/19/24  1537 02/18/24  1908   GRAM STAIN RESULT  No Polys or Bacteria seen No Polys or Bacteria seen   WOUND CULTURE   --  Culture results to follow.   BODY FLUID CULTURE, STERILE  No growth  --            Imaging: I have personally reviewed pertinent films in PACS  EKG, Pathology, and Other Studies: I have personally reviewed pertinent reports.      ** Please Note: Portions of the record may have been created with voice recognition software. Occasional wrong word or \"sound a like\" substitutions may have occurred due to the inherent limitations of voice recognition software. Read the chart carefully and recognize, using context, where substitutions have occurred. **      "

## 2024-02-21 ENCOUNTER — APPOINTMENT (INPATIENT)
Dept: RADIOLOGY | Facility: HOSPITAL | Age: 69
DRG: 378 | End: 2024-02-21
Payer: MEDICARE

## 2024-02-21 LAB
AFP-TM SERPL-MCNC: 58.59 NG/ML (ref 0–9)
ALBUMIN SERPL BCP-MCNC: 2.8 G/DL (ref 3.5–5)
ALP SERPL-CCNC: 219 U/L (ref 34–104)
ALT SERPL W P-5'-P-CCNC: 41 U/L (ref 7–52)
ANION GAP SERPL CALCULATED.3IONS-SCNC: 5 MMOL/L
APPEARANCE FLD: CLEAR
AST SERPL W P-5'-P-CCNC: 68 U/L (ref 13–39)
BACTERIA WND AEROBE CULT: NORMAL
BASOPHILS NFR FLD MANUAL: 1 %
BILIRUB SERPL-MCNC: 2.79 MG/DL (ref 0.2–1)
BUN SERPL-MCNC: 11 MG/DL (ref 5–25)
CALCIUM ALBUM COR SERPL-MCNC: 9.2 MG/DL (ref 8.3–10.1)
CALCIUM SERPL-MCNC: 8.2 MG/DL (ref 8.4–10.2)
CHLORIDE SERPL-SCNC: 106 MMOL/L (ref 96–108)
CO2 SERPL-SCNC: 25 MMOL/L (ref 21–32)
COLOR FLD: NORMAL
CREAT SERPL-MCNC: 0.8 MG/DL (ref 0.6–1.3)
ERYTHROCYTE [DISTWIDTH] IN BLOOD BY AUTOMATED COUNT: 21.2 % (ref 11.6–15.1)
GFR SERPL CREATININE-BSD FRML MDRD: 90 ML/MIN/1.73SQ M
GLUCOSE SERPL-MCNC: 119 MG/DL (ref 65–140)
GLUCOSE SERPL-MCNC: 142 MG/DL (ref 65–140)
GLUCOSE SERPL-MCNC: 152 MG/DL (ref 65–140)
GLUCOSE SERPL-MCNC: 63 MG/DL (ref 65–140)
GLUCOSE SERPL-MCNC: 64 MG/DL (ref 65–140)
GLUCOSE SERPL-MCNC: 96 MG/DL (ref 65–140)
GRAM STN SPEC: NORMAL
HCT VFR BLD AUTO: 24.1 % (ref 36.5–49.3)
HGB BLD-MCNC: 7.3 G/DL (ref 12–17)
HISTIOCYTES NFR FLD: 7 %
LYMPHOCYTES NFR BLD AUTO: 44 %
MCH RBC QN AUTO: 23.2 PG (ref 26.8–34.3)
MCHC RBC AUTO-ENTMCNC: 30.3 G/DL (ref 31.4–37.4)
MCV RBC AUTO: 77 FL (ref 82–98)
MONOCYTES NFR BLD AUTO: 45 %
MRSA NOSE QL CULT: ABNORMAL
MRSA NOSE QL CULT: ABNORMAL
NEUTS SEG NFR BLD AUTO: 3 %
PLATELET # BLD AUTO: 38 THOUSANDS/UL (ref 149–390)
POTASSIUM SERPL-SCNC: 3.9 MMOL/L (ref 3.5–5.3)
PROT SERPL-MCNC: 6 G/DL (ref 6.4–8.4)
RBC # BLD AUTO: 3.15 MILLION/UL (ref 3.88–5.62)
SITE: NORMAL
SODIUM SERPL-SCNC: 136 MMOL/L (ref 135–147)
TOTAL CELLS COUNTED SPEC: 100
WBC # BLD AUTO: 2.49 THOUSAND/UL (ref 4.31–10.16)
WBC # FLD MANUAL: 188 /UL

## 2024-02-21 PROCEDURE — 89051 BODY FLUID CELL COUNT: CPT | Performed by: STUDENT IN AN ORGANIZED HEALTH CARE EDUCATION/TRAINING PROGRAM

## 2024-02-21 PROCEDURE — 82105 ALPHA-FETOPROTEIN SERUM: CPT | Performed by: STUDENT IN AN ORGANIZED HEALTH CARE EDUCATION/TRAINING PROGRAM

## 2024-02-21 PROCEDURE — 87070 CULTURE OTHR SPECIMN AEROBIC: CPT | Performed by: STUDENT IN AN ORGANIZED HEALTH CARE EDUCATION/TRAINING PROGRAM

## 2024-02-21 PROCEDURE — 85027 COMPLETE CBC AUTOMATED: CPT | Performed by: PHYSICIAN ASSISTANT

## 2024-02-21 PROCEDURE — 99232 SBSQ HOSP IP/OBS MODERATE 35: CPT | Performed by: INTERNAL MEDICINE

## 2024-02-21 PROCEDURE — 49083 ABD PARACENTESIS W/IMAGING: CPT

## 2024-02-21 PROCEDURE — 99232 SBSQ HOSP IP/OBS MODERATE 35: CPT | Performed by: PHYSICIAN ASSISTANT

## 2024-02-21 PROCEDURE — 82948 REAGENT STRIP/BLOOD GLUCOSE: CPT

## 2024-02-21 PROCEDURE — 49083 ABD PARACENTESIS W/IMAGING: CPT | Performed by: PHYSICIAN ASSISTANT

## 2024-02-21 PROCEDURE — 0W9G3ZZ DRAINAGE OF PERITONEAL CAVITY, PERCUTANEOUS APPROACH: ICD-10-PCS | Performed by: RADIOLOGY

## 2024-02-21 PROCEDURE — 80053 COMPREHEN METABOLIC PANEL: CPT | Performed by: PHYSICIAN ASSISTANT

## 2024-02-21 PROCEDURE — 99233 SBSQ HOSP IP/OBS HIGH 50: CPT | Performed by: INTERNAL MEDICINE

## 2024-02-21 PROCEDURE — 87205 SMEAR GRAM STAIN: CPT | Performed by: STUDENT IN AN ORGANIZED HEALTH CARE EDUCATION/TRAINING PROGRAM

## 2024-02-21 RX ORDER — SPIRONOLACTONE 50 MG/1
50 TABLET, FILM COATED ORAL DAILY
Status: DISCONTINUED | OUTPATIENT
Start: 2024-02-21 | End: 2024-02-28 | Stop reason: HOSPADM

## 2024-02-21 RX ORDER — LIDOCAINE HYDROCHLORIDE 10 MG/ML
INJECTION, SOLUTION EPIDURAL; INFILTRATION; INTRACAUDAL; PERINEURAL AS NEEDED
Status: COMPLETED | OUTPATIENT
Start: 2024-02-21 | End: 2024-02-21

## 2024-02-21 RX ORDER — FUROSEMIDE 40 MG/1
40 TABLET ORAL ONCE
Status: COMPLETED | OUTPATIENT
Start: 2024-02-22 | End: 2024-02-21

## 2024-02-21 RX ORDER — DEXTROSE MONOHYDRATE 25 G/50ML
INJECTION, SOLUTION INTRAVENOUS
Status: COMPLETED
Start: 2024-02-21 | End: 2024-02-21

## 2024-02-21 RX ADMIN — DEXTROSE MONOHYDRATE 50 ML: 25 INJECTION, SOLUTION INTRAVENOUS at 08:16

## 2024-02-21 RX ADMIN — IRON SUCROSE 300 MG: 20 INJECTION, SOLUTION INTRAVENOUS at 12:07

## 2024-02-21 RX ADMIN — SPIRONOLACTONE 50 MG: 50 TABLET, FILM COATED ORAL at 12:07

## 2024-02-21 RX ADMIN — INSULIN LISPRO 1 UNITS: 100 INJECTION, SOLUTION INTRAVENOUS; SUBCUTANEOUS at 21:55

## 2024-02-21 RX ADMIN — FUROSEMIDE 40 MG: 40 TABLET ORAL at 23:46

## 2024-02-21 RX ADMIN — B-COMPLEX W/ C & FOLIC ACID TAB 1 TABLET: TAB at 08:15

## 2024-02-21 RX ADMIN — TRAZODONE HYDROCHLORIDE 50 MG: 50 TABLET ORAL at 21:55

## 2024-02-21 RX ADMIN — TAMSULOSIN HYDROCHLORIDE 0.4 MG: 0.4 CAPSULE ORAL at 17:03

## 2024-02-21 RX ADMIN — LEVOFLOXACIN 750 MG: 750 TABLET, FILM COATED ORAL at 21:55

## 2024-02-21 RX ADMIN — LIDOCAINE HYDROCHLORIDE 10 ML: 10 INJECTION, SOLUTION EPIDURAL; INFILTRATION; INTRACAUDAL; PERINEURAL at 15:27

## 2024-02-21 RX ADMIN — FUROSEMIDE 20 MG: 20 TABLET ORAL at 08:15

## 2024-02-21 NOTE — ASSESSMENT & PLAN NOTE
GI and Heme/Onc following. 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

## 2024-02-21 NOTE — ASSESSMENT & PLAN NOTE
GI following, s/p EGD 2/20 as above  S/p IR paracentesis with plan for repeat  Currently on spironolactone and lasix po per GI recs, monitor renal function

## 2024-02-21 NOTE — BRIEF OP NOTE (RAD/CATH)
IR PARACENTESIS Procedure Note    PATIENT NAME: Isael Avendano  : 1955  MRN: [id number]    Pre-op Diagnosis:   1. Generalized weakness    2. Dyspnea on exertion    3. Cirrhosis (HCC)    4. HTN (hypertension)    5. DM (diabetes mellitus) (HCC)    6. Pancytopenia (HCC)    7. Anemia    8. Ulcer of left foot, limited to breakdown of skin (HCC)    9. Osteomyelitis (HCC)      Post-op Diagnosis:   1. Generalized weakness    2. Dyspnea on exertion    3. Cirrhosis (HCC)    4. HTN (hypertension)    5. DM (diabetes mellitus) (HCC)    6. Pancytopenia (HCC)    7. Anemia    8. Ulcer of left foot, limited to breakdown of skin (HCC)    9. Osteomyelitis (HCC)        Provider:   Evelyn Willard PA-C  Assistants:     No qualified resident was available, Resident is only observing    Estimated Blood Loss: None    Findings: 2000mL clear yellow ascites drained from right paracentesis    Specimens: Labs collected and sent    Complications:  None immediately    Anesthesia: local    Evelyn Willard PA-C     Date: 2024  Time: 4:08 PM

## 2024-02-21 NOTE — ASSESSMENT & PLAN NOTE
Lab Results   Component Value Date    HGBA1C 7.3 (H) 08/18/2023       Recent Labs     02/20/24  2042 02/21/24  0743 02/21/24  0904 02/21/24  1108   POCGLU 186* 64* 119 96       Patient has been on Levquin started in [place], was told he had contracted a bacteria from the water?   Unable to see records in [facility] from [facility]    Phone number [phone] Dr. Fong.   Podiatry following   MRI concerning for OM. OR planning per Podiatry

## 2024-02-21 NOTE — ASSESSMENT & PLAN NOTE
Lab Results   Component Value Date    EGFR 90 02/21/2024    EGFR 86 02/20/2024    EGFR 76 02/19/2024    CREATININE 0.80 02/21/2024    CREATININE 0.90 02/20/2024    CREATININE 0.99 02/19/2024   Monitor renal function  Diuretics per GI recs as above

## 2024-02-21 NOTE — QUICK NOTE
The patient and his wife at bedside gave me permission to call the Podiatrist, Dr. Fong, from [place] for more information. I called the number provided and tried multiple options but didn't get a hold of anyone. Patient's wife reports that she has paperwork and a disc from [place] but left them at home. She will bring them in tomorrow. Podiatry also provided with Dr. Fong's name and listed number.

## 2024-02-21 NOTE — PROGRESS NOTES
1/2/2024    Simon Smith MD    Chief Complaint   Patient presents with    Follow-up    Hyperlipidemia       Problem List Items Addressed This Visit    None    HPI:    Sampson España is a 12 year old male who was accompanied by his father at the [facility] (formerly [facility]) for re- evaluation of high cholesterol and triglyceride levels.     The following values were obtained:    Date: 6-2-21 - total cholesterol 237, HDL 43, non-HDL cholesterol 194, cholesterol/HDL ratio 5.5.    Date: 7-20-22- total cholesterol 230, HDL 43, non-.    Date:8-26-23-total cholesterol : 228, HDL 40, , triglycerides 234, non -, ratio 5.7, alk phos 230, AST 15, ALT 30.   Date:12-27-23- total cholesterol 186, HDL 37, , triglycerides 228, non-,ratio 5.0, alk phos 305, AST 14, ALT 18, A1C 5.3.    For breakfast, he has: yogurt, protein bars, protein muffins.    For lunch, he has: brown rice, pork, ham and cheese, lettuce, spinach sandwich on white organic whole bread/wraps, chicken.    For dinner, he has: vegetarian meals, pork, steak, chicken, fish, vegetable, corn, potatoes, pasta.    For snacks, he has: trail mix, protein bar.    Sampson has 2 to 3 servings of vegetables per day, 1 serving of fruit per day, 0 to 1 serving of fish per week, and 4 servings of nuts (trail mix, cashews, almonds, pistachios) per week.    Sampson has 3 to 4 bottles of water per day.    Sampson exercises for 3 hours per day, consisting of PE, wrestling, squats, bench press.    Sampson has had no cardiovascular symptoms, no chest pain, palpitations, syncope, dizziness, pallor, cyanosis, and enjoys normal growth, development, and activity.      Sampson is active, and is able to keep up with his friends.    Sampson's growth and development have been normal.     Sampson is in 9th grade.     Sampson wants to be a food technology when he grows up.     Sampson brushes his teeth 2 times per day, and flosses his teeth 1 time per day.    Family history: dad taking meds for  Progress Note- Isael Avendano 68 y.o. 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  Previously well compensated but complicated by presence of LI-RADS 3 lesions and medium size esophageal varices. No prior history of HE or ascites.  Now presenting with pancytopenia and ascites with lower extremity edema. S/P IR paracentesis and fluid studies consistent with pHTN at this time. 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 However, patient did have medium sized varices S/P banding. Will require repeat EGD in 4 weeks to further evaluate EV but will also require hepatology follow up due to presence of LI-RADS 3 lesions on MRI/MRCP and worsening AFP (58).     MELD 3.0: 16 at 2/21/2024  5:02 AM  MELD-Na: 15 at 2/21/2024  5:02 AM  Calculated from:  Serum Creatinine: 0.80 mg/dL (Using min of 1 mg/dL) at 2/21/2024  5:02 AM  Serum Sodium: 136 mmol/L at 2/21/2024  5:02 AM  Total Bilirubin: 2.79 mg/dL at 2/21/2024  5:02 AM  Serum Albumin: 2.8 g/dL at 2/21/2024  5:02 AM  INR(ratio): 1.42 at 2/20/2024  9:02 AM  Age at listing (hypothetical): 68 years  Sex: Male at 2/21/2024  5:02 AM    # Anemia with Pancytopenia:   Hemoglobin on admission 4.9 from baseline 9-10, platelets 47  S/P 2U pRBC with improvement and stabilization; today hgb 7.3  No evidence of overt GI bleed, and no prior history of GI bleed  Hematology and oncology currently consulted, appreciate input  Status post colonoscopy in 2022, no indication to repeat at this time  S/P EGD on 2/20 with medium EV with no stigmata of recent bleed, PHG and GAVE S/P APC  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    Review of Systems   Constitutional: Negative.    HENT: Negative.     Eyes: Negative.    Respiratory: Negative.     Cardiovascular: Negative.    Gastrointestinal: Negative.    Endocrine: Negative.    Genitourinary: Negative.    Musculoskeletal: Negative.    Skin: Negative.    Allergic/Immunologic: Positive for environmental allergies. Negative for food allergies.        Seasonal allergies, Zyrtec or eyedrops PRN.   Neurological: Negative.    Hematological: Negative.    Psychiatric/Behavioral: Negative.       Current Medications:      Summary of your Discharge Medications      as of 2024  4:30 PM     You have not been prescribed any medications.         Relevant Past Medical History:  History reviewed. No pertinent past medical history.    History reviewed. No pertinent surgical history.    ALLERGIES:  No Known Allergies     History reviewed. No pertinent family history..    Examination:   Blood pressure (!) 140/69, pulse 94, height 5' 7.52\" (1.715 m), weight 95.9 kg (211 lb 6.7 oz), SpO2 98 %.  Weight    24 1518   Weight: 95.9 kg (211 lb 6.7 oz)       Physical Exam  Vitals and nursing note reviewed.   Constitutional:       Appearance: He is well-developed.   HENT:      Head: Normocephalic.      Nose: Nose normal.   Eyes:      Conjunctiva/sclera: Conjunctivae normal.   Cardiovascular:      Rate and Rhythm: Normal rate and regular rhythm.      Heart sounds: Normal heart sounds. No murmur heard.  Pulmonary:      Effort: Pulmonary effort is normal. No respiratory distress.      Breath sounds: Normal breath sounds. No wheezing.   Abdominal:      General: Bowel sounds are normal.      Palpations: Abdomen is soft. There  paracentesis order placed  Current regimen: Lasix 20 mg PO BID  Transition to: Lasix 40 mg daily and Aldactone 50 mg daily; monitor creatinine  Home regimen: Lasix 20 mg every day   Currently on Levaquin 750 mg q 24 hours for skin infection, also covers for SBP  Limit dietary sodium intake to less than 2g/day   Avoid use of ACE, ARBs, and NSAIDs     # Esophageal Varcies:   Most recent EGD: 2/20/24 - 3 medium sized EV; S/P banding x4  Current regimen: None indication due to banding   Home regimen: None  Plan for repeat EGD +/- banding in 4 weeks      # Liver Lesion:  AFP level 9/1/23 - 29.68; now increased to 58.59  MRI/MRCP 2/19/24 - Several LI-RADS 3 Observations noted   Follow up MRI Abdomen scheduled for 2/22/24  Currently followed with Oncology Dr. Martins   Will require IR liver biopsy to further evaluate liver lesions in setting of high AFP  Outpatient follow up with Hepatology     ______________________________________________________________________    Subjective:     Patient seen and examined at bedside. Sitting in bedside chair. Does complain of worsening abdominal distension but no significant abdominal pain. Denies N/V or fever/chills. Offers no additional complaints at this time.     Medication Administration - last 24 hours from 02/20/2024 1127 to 02/21/2024 1127         Date/Time Order Dose Route Action Action by     02/21/2024 0815 EST multivitamin stress formula tablet 1 tablet 1 tablet Oral Given Meagan Bates RN     02/20/2024 1745 EST tamsulosin (FLOMAX) capsule 0.4 mg 0.4 mg Oral Given Luiza Lawson RN     02/20/2024 2101 EST traZODone (DESYREL) tablet 50 mg 50 mg Oral Given Devendra Mendez RN     02/21/2024 0813 EST insulin lispro (HumaLOG) 100 units/mL subcutaneous injection 1-5 Units -- Subcutaneous Not Given Meagan Bates RN     02/20/2024 1726 EST insulin lispro (HumaLOG) 100 units/mL subcutaneous injection 1-5 Units -- Subcutaneous Not Given Luiza Lawson RN     02/20/2024 2101 EST insulin  "lispro (HumaLOG) 100 units/mL subcutaneous injection 1-5 Units -- Subcutaneous Not Given Devendra Mendez RN     02/20/2024 2051 EST levofloxacin (LEVAQUIN) tablet 750 mg 750 mg Oral Given Devendra Mendez RN     02/21/2024 0815 EST furosemide (LASIX) tablet 20 mg 20 mg Oral Given Meagan Bates, [facility]     02/20/2024 1745 EST furosemide (LASIX) tablet 20 mg 20 mg Oral Given Luiza Lawson RN     02/20/2024 1157 EST dextrose 50 % IV solution **ADS Override Pull** 25 mL  Given Alvarado Rutledge RN     02/20/2024 1352 EST dextrose 5 % and sodium chloride 0.9 % infusion 50 mL/hr Intravenous New Bag Luiza Lawson RN     02/20/2024 2102 EST insulin glargine (LANTUS) subcutaneous injection 15 Units 0.15 mL 15 Units Subcutaneous Not Given Devendra Mendez RN     02/20/2024 1707 EST dextrose 50 % IV solution 12.5 g 12.5 g Intravenous Given Tonja Gay, [facility]     02/20/2024 2218 EST dextromethorphan-guaiFENesin (ROBITUSSIN DM) oral syrup 10 mL 10 mL Oral Given Devendra Mendez RN     02/20/2024 2300 EST Gadobutrol injection (SINGLE-DOSE) SOLN 11 mL 11 mL Intravenous Given Florence Emily     02/21/2024 0816 EST dextrose 50 % IV solution **ADS Override Pull** 50 mL  Given Meagan Bates RN            Objective:     Vitals: Blood pressure 136/65, pulse 88, temperature 98.3 °F (36.8 °C), temperature source Oral, resp. rate 16, height 5' 11\" (1.803 m), weight 110 kg (242 lb), SpO2 98%.,Body mass index is 34.72 kg/m².      Intake/Output Summary (Last 24 hours) at 2/21/2024 1127  Last data filed at 2/20/2024 1610  Gross per 24 hour   Intake 200 ml   Output 200 ml   Net 0 ml       Physical Exam  Constitutional:       General: He is not in acute distress.     Appearance: He is obese. He is not toxic-appearing.   HENT:      Head: Normocephalic and atraumatic.      Nose: Nose normal. No congestion.      Mouth/Throat:      Mouth: Mucous membranes are moist.      Pharynx: No oropharyngeal exudate.   Eyes:      General: No scleral icterus.     " is no mass.   Musculoskeletal:         General: Normal range of motion.      Cervical back: Normal range of motion.   Skin:     General: Skin is warm and dry.      Findings: No rash.   Neurological:      Mental Status: He is alert and oriented to person, place, and time.      Motor: No abnormal muscle tone.      Coordination: Coordination normal.   Psychiatric:         Behavior: Behavior normal.         Time: Total face to face time spent with patient 30 minutes. Greater than 50% of the total time was spent counseling and/or coordinating care.    ECG/ECHO:    An ECG 9-5-23 showed sinus rhythm, HR 70, normal axis, normal ventricular forces, and was otherwise normal.    Assessment/Plan:    Sampson has high triglyceride levels, which appear to be related lifestyle.    His cholesterol has normalized.    He had initial high BP ib 1-2-24, 140/60 (he was nervous), repeat after breathing slowly 120/54, HR 84.    Sampson may have improvement in triglyceride levels with lifestyle changes.     Sampson should exercise for at least 1 hour per day, and eat healthy foods.    He should cut down on simple carbohydrates, such as waffles, plain or sesame bagels, white bread, fruit smoothies, corn, potatoes, pasta. and minimize red meat such as steak.    Sampson should eat plenty of vegetables, at least 2-3 servings per day, fruits, at least 2-3 servings per day, and eat nuts 4-5 times per week.    Sampson should eat more fish, 1-2 times per week, especially fish such as salmon, tuna, sardines, or mackerel.    Sampson should increase his intake of fiber and whole grain foods, and limit foods with sugar, corn syrup or other sweeteners.he  should cut down or eliminate eating \"white\" food (avoid \"cartoon\" cereals, avoid food with sugar, salt, white rice, white pasta, white bread), better to have brown rice, whole grain pasta, whole grain bread.    Sampson should drink plenty of water, at least 2 to 3 bottles of water per day, more at times when he is exercising a  Conjunctiva/sclera: Conjunctivae normal.   Cardiovascular:      Rate and Rhythm: Normal rate.      Pulses: Normal pulses.      Heart sounds: No murmur heard.  Pulmonary:      Effort: Pulmonary effort is normal.   Abdominal:      General: Bowel sounds are normal. There is distension.      Tenderness: There is no abdominal tenderness. There is no guarding or rebound.   Musculoskeletal:         General: Normal range of motion.      Cervical back: Normal range of motion.      Right lower leg: Edema present.      Left lower leg: Edema present.   Skin:     General: Skin is warm.      Coloration: Skin is not jaundiced or pale.   Neurological:      Mental Status: He is alert and oriented to person, place, and time.      Comments: No asterixis   Psychiatric:         Mood and Affect: Mood normal.         Behavior: Behavior normal.           Invasive Devices       Peripheral Intravenous Line  Duration             Peripheral IV 02/20/24 Right Hand <1 day                    Lab Results:  No results displayed because visit has over 200 results.          Imaging Studies: I have personally reviewed pertinent imaging studies.       lot, drinking caffeine, or in hot weather.    Sampson should not be at increased risk for anesthesia or surgery compared to others in the background population without cardiac problems, and he is clear from a cardiology viewpoint for anesthesia and surgery, and does not need any special precautions. In my opinion, he does not need SBE prophylaxis for surgical or dental procedures.    There should be no restriction placed on his activity from a cardiac standpoint. Sampson can participate in all activities, including competitive sports, but should stop and rest if he does not feel well (self-limited exercise).    Sampson should brush and floss his teeth once or twice a day, as this will decrease the chance for gum disease, oral abscess, heart attacks in the future.    Sampson should avoid smoking and vaping in the future.    Sampson should have fasting lipid levels and liver function tests in June 2024.    If all goes well, Sam[facility] should [facility] in June or July 2024, when we will review lipid levels, and consider other tests as needed.    I spoke at length with Sampson España's father and Sampson España about the above and answered their questions.  Please call me if you have further questions about today's visit, and thanks for allowing me to participate in the care of Sampson España.    Send copy to:   Referring Physician  Family    Instructed the family to return to your office.    Tong Barrios MD

## 2024-02-21 NOTE — QUICK NOTE
Podiatry Quick Note:     Attempted to see patient at bedside. Patient was in interventional radiology.     Podiatry will attempt to see patient tomorrow.

## 2024-02-21 NOTE — SEDATION DOCUMENTATION
Right side Paracentesis performed by Evelyn Willard PA-C, 2000 ML Clear/Yellow fluids obtained, labs sent, Images saved. Patient tolerated well.

## 2024-02-21 NOTE — ASSESSMENT & PLAN NOTE
Lab Results   Component Value Date    HGBA1C 7.3 (H) 08/18/2023       Recent Labs     02/20/24  2042 02/21/24  0743 02/21/24  0904 02/21/24  1108   POCGLU 186* 64* 119 96         Blood Sugar Average: Last 72 hrs:  (P) [id number]  Lantus was decreased from 40 units to 30 units the night of 2/19 and patient was hypoglycemic on 2/20 requiring dextrose pushes and a few hours of D5NS as he was NPO for EGD. Additionally, Amaryl was discontinued and Lantus was further decreased from 30 to 15 units last night. Of note, patient did not receive Lantus last night and was hypoglycemic again at 64 requiring dextorse. Will hold scheduled Lantus for now  Continue SSI and glucose checks  Hypoglycemia protocol

## 2024-02-21 NOTE — PROGRESS NOTES
[facility]  Progress Note  Name: Isael Avendano I  MRN: [id number]  Unit/Bed#: NW8 861-02 I Date of Admission: 2/18/2024   Date of Service: 2/21/2024 I Hospital Day: 3    Assessment/Plan   * Symptomatic anemia  Assessment & Plan  GI and Heme/Onc following. 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 for which 4 bands were successful placed, moderate erythematous mucosa in the antrum to which coagulation was induced with argon plasma coagulation  On IV Venofer   CBC in AM    DM2 (diabetes mellitus, type 2) (HCC)  Assessment & Plan  Lab Results   Component Value Date    HGBA1C 7.3 (H) 08/18/2023       Recent Labs     02/20/24 2042 02/21/24  0743 02/21/24  0904 02/21/24  1108   POCGLU 186* 64* 119 96         Blood Sugar Average: Last 72 hrs:  (P) [id number]  Lantus was decreased from 40 units to 30 units the night of 2/19 and patient was hypoglycemic on 2/20 requiring dextrose pushes and a few hours of D5NS as he was NPO for EGD. Additionally, Amaryl was discontinued and Lantus was further decreased from 30 to 15 units last night. Of note, patient did not receive Lantus last night and was hypoglycemic again at 64 requiring dextorse. 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   Unable to see records in [facility] from [facility]    Phone number [phone] Dr. Fong.   Podiatry following   MRI concerning for OM. OR planning per Podiatry     Cirrhosis, alcoholic (HCC)  Assessment & Plan  GI following, s/p EGD 2/20 as above  S/p IR paracentesis with plan for repeat  Currently on spironolactone and lasix po per GI recs, monitor renal function     Thrombocytopenia (HCC)  Assessment & Plan  Platelets are 38, Heme/Onc following   No known bleeding      Stage 3a chronic kidney disease (HCC)  Assessment & Plan  Lab Results   Component Value Date    EGFR 90 02/21/2024    EGFR 86 02/20/2024    EGFR 76 02/19/2024    CREATININE 0.80 02/21/2024    CREATININE 0.90 02/20/2024    CREATININE 0.99 02/19/2024   Monitor renal function  Diuretics per GI recs as above    MEG (obstructive sleep apnea)  Assessment & Plan  Patient has brought and can use his home cpap    Murmur  Assessment & Plan  Noted, echo revealed LVE 65%, Grade 2 DD       Essential hypertension  Assessment & Plan  BP stable, on PO spironolactone and Lasix as above    Urinary retention  Assessment & Plan  Continue flomax           VTE Pharmacologic Prophylaxis: VTE Score: 4 Moderate Risk (Score 3-4) - Pharmacological DVT Prophylaxis Contraindicated. Sequential Compression Devices Ordered.    Mobility:   Basic Mobility Inpatient Raw Score: 23  JH-HLM Goal: 7: Walk 25 feet or more  JH-HLM Achieved: 7: Walk 25 feet or more  HLM Goal achieved. Continue to encourage appropriate mobility.    Patient Centered Rounds: I performed bedside rounds with nursing staff today.  Meagan  Discussions with Specialists or Other Care Team Provider: GI fellow and Podiatry resident     Education and Discussions with Family / Patient: Updated  (wife and friend) at bedside.    Total Time Spent on Date of Encounter in care of patient: 40 mins. This time was spent on one or more of the following: performing physical exam;  "counseling and coordination of care; obtaining or reviewing history; documenting in the medical record; reviewing/ordering tests, medications or procedures; communicating with other healthcare professionals and discussing with patient's family/caregivers.    Current Length of Stay: 3 day(s)  Current Patient Status: Inpatient   Certification Statement: The patient will continue to require additional inpatient hospital stay due to OM, Podiatry OR planning, improvement in CB counts  Discharge Plan: Anticipate discharge in >72 hrs to discharge location to be determined pending rehab evaluations.    Code Status: Level 1 - Full Code    Subjective:   Mr. Avendano reports being \"hangry\" today because he was NPO. He states he wants to talk with Podiatry about surgery planning     Objective:     Vitals:   Temp (24hrs), Av.8 °F (36.6 °C), Min:97.3 °F (36.3 °C), Max:98.3 °F (36.8 °C)    Temp:  [97.3 °F (36.3 °C)-98.3 °F (36.8 °C)] 98.3 °F (36.8 °C)  HR:  [] 88  Resp:  [16-22] 16  BP: (124-141)/(61-68) 136/65  SpO2:  [97 %-100 %] 98 %  Body mass index is 34.72 kg/m².     Input and Output Summary (last 24 hours):     Intake/Output Summary (Last 24 hours) at 2024 1423  Last data filed at 2024 1610  Gross per 24 hour   Intake 200 ml   Output --   Net 200 ml       Physical Exam:   Physical Exam  Vitals reviewed.   Constitutional:       Appearance: He is obese.      Comments: Patient seen sitting in bed with his wife and friends present   Cardiovascular:      Rate and Rhythm: Normal rate and regular rhythm.   Pulmonary:      Effort: Pulmonary effort is normal. No respiratory distress.      Breath sounds: Normal breath sounds.   Abdominal:      General: There is distension.      Palpations: Abdomen is soft.      Tenderness: There is no abdominal tenderness.   Musculoskeletal:      Right lower leg: Edema present.      Left lower leg: Edema present.   Skin:     General: Skin is warm.   Neurological:      Mental Status: He " is alert and oriented to person, place, and time.   Psychiatric:         Mood and Affect: Mood normal.          Additional Data:     Labs:  Results from last 7 days   Lab Units 02/21/24  1018 02/20/24  0902   WBC Thousand/uL 2.49* 2.73*   HEMOGLOBIN g/dL 7.3* 7.4*   HEMATOCRIT % 24.1* 25.0*   PLATELETS Thousands/uL 38* 40*   NEUTROS PCT %  --  61   LYMPHS PCT %  --  18   MONOS PCT %  --  10   EOS PCT %  --  10*     Results from last 7 days   Lab Units 02/21/24  0502   SODIUM mmol/L 136   POTASSIUM mmol/L 3.9   CHLORIDE mmol/L 106   CO2 mmol/L 25   BUN mg/dL 11   CREATININE mg/dL 0.80   ANION GAP mmol/L 5   CALCIUM mg/dL 8.2*   ALBUMIN g/dL 2.8*   TOTAL BILIRUBIN mg/dL 2.79*   ALK PHOS U/L 219*   ALT U/L 41   AST U/L 68*   GLUCOSE RANDOM mg/dL 63*     Results from last 7 days   Lab Units 02/20/24  0902   INR  1.42*     Results from last 7 days   Lab Units 02/21/24  1108 02/21/24  0904 02/21/24  0743 02/20/24  2042 02/20/24  1720 02/20/24  1706 02/20/24  1637 02/20/24  1300 02/20/24  1138 02/20/24  0833 02/20/24  0804 02/20/24  0737   POC GLUCOSE mg/dl 96 119 64* 186* 91 54* 55* 72 57* 94 67 55*               Lines/Drains:  Invasive Devices       Peripheral Intravenous Line  Duration             Peripheral IV 02/20/24 Right Hand <1 day                          Imaging: Reviewed radiology reports from this admission including: MRI foot and EGD    Recent Cultures (last 7 days):   Results from last 7 days   Lab Units 02/19/24  1537 02/18/24  1908   GRAM STAIN RESULT  No Polys or Bacteria seen No Polys or Bacteria seen   WOUND CULTURE   --  Few Colonies of   BODY FLUID CULTURE, STERILE  No growth  --        Last 24 Hours Medication List:   Current Facility-Administered Medications   Medication Dose Route Frequency Provider Last Rate    acetaminophen  650 mg Oral Q6H PRN Lou Smith MD      benzonatate  200 mg Oral TID PRN Lou Smith MD      dextromethorphan-guaiFENesin  10 mL Oral Q4H PRN Alize Sanchez,  ABELARDO      [START ON 2/22/2024] furosemide  40 mg Oral Once Cait Heller, DO      insulin lispro  1-5 Units Subcutaneous TID AC Lou Smith MD      insulin lispro  1-5 Units Subcutaneous HS Lou Smith MD      iron sucrose  300 mg Intravenous Once per day on Monday Wednesday Friday Winstonkory Mcneill, DO      levofloxacin  750 mg Oral Q24H Lou Smith MD      multivitamin stress formula  1 tablet Oral Daily Lou Smith MD      ondansetron  4 mg Intravenous Q6H PRN Lou Smith MD      spironolactone  50 mg Oral Daily Cait Heller, DO      tamsulosin  0.4 mg Oral Daily With Dinner Lou Smith MD      traZODone  50 mg Oral HS Lou Smith MD          Today, Patient Was Seen By: Nabil Waters PA-C    **Please Note: This note may have been constructed using a voice recognition system.**

## 2024-02-21 NOTE — PROGRESS NOTES
Medical Oncology/Hematology Progress Note  Isael Avendano, male, 68 y.o., 1955,  NW8 [id number]/[id number], [id number]     Assessment and Plan  1. Microcytic Anemia suspected due to GI blood loss   2. Iron Deficiency Anemia   3. Coagulopathy due to liver disease, INR ~ 1.4   4. 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 Esophageal Varices     S/p transfusions of RBC's , Hgb was 4.9 on arrival, holding stable after transfusion , today 7.4   S/p EGD 2/20 with GI , three E varices G2 were  & s/p APC   Starting IV Venofer 300 mg x 6 doses (3x/week) to follow with PO Iron supplementation   B12 & Folate WNL ; hemolysis workup negative       6. Acute on Chronic Thrombocytopenia   7. Liver Lesions LI-RADS 3 with elevated AFP     Repeate MRI abdomen 2/19 with stable liver lesions; repeate AFP doubled - increased to 58.59 from 29.68 on September 1, 2023.     Gastroenterology team following and the potential plan for IR biopsy is noted.  Explained to the patient the importance of close follow-up with GI/hepatology team and need to rule out HCC.      Patient has follow-up appointment with Dr. Martins on 2/29/2024      ________________________________    Communication with patient/family:  I did update the patient, as well as his wife at bedside  Communication with team:  Did communicate with ANA Waters, primary team. I also communicated with Dr. Heller regarding above concern.    I did review this patient with Dr. Russell and they are in agreement with this plan.          Subjective:    Review of Systems  - GENERAL: Negative for any nausea, vomiting, fevers, chills, or weight loss.  - HEENT: Negative for any head/Neck trauma, pain, double/blurry vision, sinusitis, rhinitis, nose bleeding.  - CARDIAC: Negative for any chest pain, palpitation, Dyspnea on exertion, peripheral edema.  - PULMONARY: Negative for any SOB, cough, wheezing.   - GASTROINTESTINAL: +; abdominal distension , now better post paracentesis;  Negative for any abdominal pain, N/V/D/C,  blood in stool.   - GENITOURINARY: Negative for any dysuria, hematuria, incontinence.  - NEUROLOGIC: Negative for any muscle weakness, numbness/tingling, memory changes.    - MUSCULOSKELETAL: Negative for any joint pains/swelling, limited ROM.   - INTEGUMENTARY: has left foot wound; Negative for any rashes.   - HEMATOLOGIC: Negative for any abnormal bruising, frequent infections or bleeding.    Objective:     Medication Administration - last 24 hours from 02/20/2024 1011 to 02/21/2024 1011         Date/Time Order Dose Route Action Action by     02/21/2024 0815 EST multivitamin stress formula tablet 1 tablet 1 tablet Oral Given Meagan Bates RN     02/20/2024 1745 EST tamsulosin (FLOMAX) capsule 0.4 mg 0.4 mg Oral Given Luiza Lawson RN     02/20/2024 2101 EST traZODone (DESYREL) tablet 50 mg 50 mg Oral Given Devendra Mendez RN     02/21/2024 0813 EST insulin lispro (HumaLOG) 100 units/mL subcutaneous injection 1-5 Units -- Subcutaneous Not Given Meagan Bates RN     02/20/2024 1726 EST insulin lispro (HumaLOG) 100 units/mL subcutaneous injection 1-5 Units -- Subcutaneous Not Given Luiza Lawson RN     02/20/2024 1119 EST insulin lispro (HumaLOG) 100 units/mL subcutaneous injection 1-5 Units -- Subcutaneous Not Given Luiza Lawson RN     02/20/2024 2101 EST insulin lispro (HumaLOG) 100 units/mL subcutaneous injection 1-5 Units -- Subcutaneous Not Given Devendra Mendez RN     02/20/2024 2051 EST levofloxacin (LEVAQUIN) tablet 750 mg 750 mg Oral Given Devendra Mendez RN     02/21/2024 0815 EST furosemide (LASIX) tablet 20 mg 20 mg Oral Given Meagan Bates RN     02/20/2024 1745 EST furosemide (LASIX) tablet 20 mg 20 mg Oral Given Luiza Lawson RN     02/20/2024 1157 EST dextrose 50 % IV solution **ADS Override Pull** 25 mL  Given Alvarado Rutledge RN     02/20/2024 1352 EST dextrose 5 % and sodium chloride 0.9 % infusion 50 mL/hr Intravenous New Bag Luiza Lawson RN     02/20/2024 2102 EST insulin glargine (LANTUS)  "subcutaneous injection 15 Units 0.15 mL 15 Units Subcutaneous Not Given Devendra Mendez RN     02/20/2024 1707 EST dextrose 50 % IV solution 12.5 g 12.5 g Intravenous Given Tonja Gay RN     02/20/2024 2218 EST dextromethorphan-guaiFENesin (ROBITUSSIN DM) oral syrup 10 mL 10 mL Oral Given Devendra Mendez RN     02/20/2024 2300 EST Gadobutrol injection (SINGLE-DOSE) SOLN 11 mL 11 mL Intravenous Given Florence Vázquezon     02/21/2024 0816 EST dextrose 50 % IV solution **ADS Override Pull** 50 mL  Given Meagan Bates RN            /65 (BP Location: Left arm)   Pulse 88   Temp 98.3 °F (36.8 °C) (Oral)   Resp 16   Ht 5' 11\" (1.803 m)   Wt 110 kg (242 lb)   SpO2 98%   BMI 34.72 kg/m²       Physical Exam  - GEN: Appears well, alert and oriented x 3, pleasant and cooperative, in no acute distress  - HEENT: Anicteric, mucous membranes moist, PERRL and EOMI   - NECK: No lymphadenopathy, JVD or carotid bruits   - HEART: RRR, normal S1 and S2, no murmurs, clicks, gallops or rubs   - LUNGS: Clear to auscultation bilaterally; no wheezes, rales, or rhonchi  - ABDOMEN: mildly distended abdomen; hepatomegaly present; Normal bowel sounds, soft, no tenderness,.   - EXTREMITIES: Peripheral pulses normal; no clubbing, cyanosis, or edema  - NEURO: No focal findings, CN II-XII are grossly intact.   - Musculoskeletal: 5/5 strength, normal ROM, no swollen or erythematous joints.   - SKIN: left foot wound is dressed ; otherwise Normal without suspicious lesions on exposed skin      Recent Results (from the past 48 hour(s))   Fingerstick Glucose (POCT)    Collection Time: 02/19/24 11:00 AM   Result Value Ref Range    POC Glucose 166 (H) 65 - 140 mg/dl   Body fluid white cell count with differential    Collection Time: 02/19/24  3:37 PM   Result Value Ref Range    Site Paracentesis     Color, Fluid Light Yellow Clear, Colorless,Yellow    Clarity, Fluid Hazy (A) Clear    WBC, Fluid 217 /ul   Body fluid culture and Gram stain "    Collection Time: 02/19/24  3:37 PM    Specimen: Paracentesis; Body Fluid   Result Value Ref Range    Body Fluid Culture, Sterile No growth     Gram Stain Result No Polys or Bacteria seen    Total Protein, body fluid    Collection Time: 02/19/24  3:37 PM   Result Value Ref Range    Protein, Fluid <3.0 g/dL   Glucose, body fluid    Collection Time: 02/19/24  3:37 PM   Result Value Ref Range    Glucose, Fluid 165 mg/dL   Albumin, fluid    Collection Time: 02/19/24  3:37 PM   Result Value Ref Range    Albumin, Fluid <1.5 g/dL   LD (LDH), Body Fluid    Collection Time: 02/19/24  3:37 PM   Result Value Ref Range    LD, Fluid 34 U/L   Body Fluid TP Low Concentration    Collection Time: 02/19/24  3:37 PM   Result Value Ref Range    TOTPROTEINFL 0.7 g/dL   Body Fluid Diff    Collection Time: 02/19/24  3:37 PM   Result Value Ref Range    Total Counted 100     Neutrophils % (Fluid) 3 %    Lymphs % (Fluid) 15 %    Mesothelial % (Fluid) 3 %    Histiocyte % (Fluid) 41 %    Monocytes % (Fluid) 38 %   Fingerstick Glucose (POCT)    Collection Time: 02/19/24  4:43 PM   Result Value Ref Range    POC Glucose 116 65 - 140 mg/dl   Chronic Hepatitis Panel    Collection Time: 02/19/24  5:36 PM   Result Value Ref Range    Hepatitis B Surface Ag Non-reactive Non-Reactive    Hepatitis C Ab Non-reactive Non-Reactive    Hep B C IgM Non-reactive Non-Reactive    Hep B Core Total Ab Non-reactive Non-Reactive   HIV 1/2 AG/AB w Reflex [facility] for 2 yr old and above    Collection Time: 02/19/24  5:36 PM   Result Value Ref Range    HIV-1 p24 Antigen Non-Reactive Non-Reactive    HIV-1 Antibody Non-Reactive Non-Reactive    HIV-2 Antibody Non-Reactive Non-Reactive    HIV Ag-Ab 5th Gen Non-Reactive Non-Reactive   Haptoglobin    Collection Time: 02/19/24  5:36 PM   Result Value Ref Range    Haptoglobin 20 (L) 32 - 363 mg/dL   Direct antiglobulin test    Collection Time: 02/19/24  5:36 PM   Result Value Ref Range    DIRECT RAJESH Negative    Fibrin split  products    Collection Time: 02/19/24  5:36 PM   Result Value Ref Range    FDP >10 <20 (A) <10   D-dimer, quantitative    Collection Time: 02/19/24  5:36 PM   Result Value Ref Range    D-Dimer, Quant 3.32 (H) <0.50 ug/ml FEU   Antithrombin III Activity    Collection Time: 02/19/24  5:36 PM   Result Value Ref Range    AntiThrombIN III Activity 66 (L) 92 - 136 % of Normal   Hemolysis Smear    Collection Time: 02/19/24  5:36 PM   Result Value Ref Range    Hemolysis Smear No Schistocytes or Helmet Cells noted    Plasminogen activity    Collection Time: 02/19/24  5:36 PM   Result Value Ref Range    Plasminogen 45.0 (L) 77 - 138 % of Normal   Fibrinogen    Collection Time: 02/19/24  5:36 PM   Result Value Ref Range    Fibrinogen 247 207 - 520 mg/dL   Platelet count    Collection Time: 02/19/24  5:36 PM   Result Value Ref Range    Platelets 41 (L) 149 - 390 Thousands/uL   APTT    Collection Time: 02/19/24  5:36 PM   Result Value Ref Range    PTT 48 (H) 23 - 37 seconds   Protime-INR    Collection Time: 02/19/24  5:36 PM   Result Value Ref Range    Protime 17.5 (H) 11.6 - 14.5 seconds    INR 1.46 (H) 0.84 - 1.19   Fingerstick Glucose (POCT)    Collection Time: 02/19/24  8:31 PM   Result Value Ref Range    POC Glucose 175 (H) 65 - 140 mg/dl   Prepare Leukoreduced RBC: 2 Units, Leukoreduced    Collection Time: 02/20/24  5:55 AM   Result Value Ref Range    Unit Product Code U4951F34     Unit Number [id number]     Unit ABO A     Unit RH POS     Crossmatch Compatible     Unit Dispense Status Presumed Trans     Unit Product Volume 350 mL    Unit Product Code K2716A29     Unit Number [id number]     Unit ABO A     Unit RH POS     Crossmatch Compatible     Unit Dispense Status Presumed Trans     Unit Product Volume 350 mL   Fingerstick Glucose (POCT)    Collection Time: 02/20/24  7:37 AM   Result Value Ref Range    POC Glucose 55 (L) 65 - 140 mg/dl   Fingerstick Glucose (POCT)    Collection Time: 02/20/24  8:04 AM   Result  Value Ref Range    POC Glucose 67 65 - 140 mg/dl   Fingerstick Glucose (POCT)    Collection Time: 02/20/24  8:33 AM   Result Value Ref Range    POC Glucose 94 65 - 140 mg/dl   Comprehensive metabolic panel    Collection Time: 02/20/24  9:02 AM   Result Value Ref Range    Sodium 140 135 - 147 mmol/L    Potassium 3.6 3.5 - 5.3 mmol/L    Chloride 108 96 - 108 mmol/L    CO2 30 21 - 32 mmol/L    ANION GAP 2 mmol/L    BUN 15 5 - 25 mg/dL    Creatinine 0.90 0.60 - 1.30 mg/dL    Glucose 88 65 - 140 mg/dL    Calcium 8.3 (L) 8.4 - 10.2 mg/dL    Corrected Calcium 9.3 8.3 - 10.1 mg/dL    AST 56 (H) 13 - 39 U/L    ALT 43 7 - 52 U/L    Alkaline Phosphatase 233 (H) 34 - 104 U/L    Total Protein 6.0 (L) 6.4 - 8.4 g/dL    Albumin 2.7 (L) 3.5 - 5.0 g/dL    Total Bilirubin 2.34 (H) 0.20 - 1.00 mg/dL    eGFR 86 ml/min/1.73sq m   CBC and differential    Collection Time: 02/20/24  9:02 AM   Result Value Ref Range    WBC 2.73 (L) 4.31 - 10.16 Thousand/uL    RBC 3.17 (L) 3.88 - 5.62 Million/uL    Hemoglobin 7.4 (L) 12.0 - 17.0 g/dL    Hematocrit 25.0 (L) 36.5 - 49.3 %    MCV 79 (L) 82 - 98 fL    MCH 23.3 (L) 26.8 - 34.3 pg    MCHC 29.6 (L) 31.4 - 37.4 g/dL    RDW 19.9 (H) 11.6 - 15.1 %    Platelets 40 (L) 149 - 390 Thousands/uL    nRBC 0 /100 WBCs    Neutrophils Relative 61 43 - 75 %    Immat GRANS % 0 0 - 2 %    Lymphocytes Relative 18 14 - 44 %    Monocytes Relative 10 4 - 12 %    Eosinophils Relative 10 (H) 0 - 6 %    Basophils Relative 1 0 - 1 %    Neutrophils Absolute 1.67 (L) 1.85 - 7.62 Thousands/µL    Immature Grans Absolute 0.01 0.00 - 0.20 Thousand/uL    Lymphocytes Absolute 0.49 (L) 0.60 - 4.47 Thousands/µL    Monocytes Absolute 0.27 0.17 - 1.22 Thousand/µL    Eosinophils Absolute 0.26 0.00 - 0.61 Thousand/µL    Basophils Absolute 0.03 0.00 - 0.10 Thousands/µL   Protime-INR    Collection Time: 02/20/24  9:02 AM   Result Value Ref Range    Protime 17.2 (H) 11.6 - 14.5 seconds    INR 1.42 (H) 0.84 - 1.19   TIBC Panel (incl. Iron,  TIBC, % Iron Saturation)    Collection Time: 02/20/24  9:02 AM   Result Value Ref Range    Iron Saturation 7 (L) 15 - 50 %    TIBC 330 250 - 450 ug/dL    Iron 24 (L) 50 - 212 ug/dL    UIBC 306 155 - 355 ug/dL   Ferritin    Collection Time: 02/20/24  9:02 AM   Result Value Ref Range    Ferritin 9 (L) 24 - 336 ng/mL   Fingerstick Glucose (POCT)    Collection Time: 02/20/24 11:38 AM   Result Value Ref Range    POC Glucose 57 (L) 65 - 140 mg/dl   Fingerstick Glucose (POCT)    Collection Time: 02/20/24  1:00 PM   Result Value Ref Range    POC Glucose 72 65 - 140 mg/dl   Echo complete w/ contrast if indicated    Collection Time: 02/20/24  1:07 PM   Result Value Ref Range    BSA 2.29 m2    LVOT stroke volume 109.18 cm3    LVOT stroke volume index 52.40 ml/m2    LVOT Cardiac Output 11.33 l/min    LVOT Cardiac Index 4.95 l/min/m2    LVIDd 5.10 cm    LVIDS 3.40 cm    IVSd 1.30 cm    LVPWd 1.30 cm    LVOT diameter 2.0 cm    LVOT peak VTI 34.77 cm    FS 33 28 - 44    MV E' Tissue Velocity Septal 8 cm/s    LA Volume Index (BP) 68.1 mL/m2    E/A ratio 1.15     E wave deceleration time 247 ms    MV Peak E Trae 147 cm/s    MV Peak A Trae 1.28 m/s    AV LVOT peak gradient 9 mmHg    LVOT peak trae 1.53 m/s    RVID d 4.8 cm    Tricuspid annular plane systolic excursion 1.90 cm    LA size 5.2 cm    LA length (A2C) 6.50 cm    LA volume (BP) 156 mL    RAA A4C 26.3 cm2    Aortic valve peak velocity 2.02 m/s    Ao VTI 40.2 cm    AV mean gradient 9 mmHg    LVOT mn grad 5.0 mmHg    AV peak gradient 16 mmHg    AV area by cont VTI 3.0 cm2    AV area peak trae 2.6 cm2    MV mean gradient antegrade 7 mmHg    MV peak gradient antegrade 12 mmHg    MV VTI 43.52 cm    MV stenosis pressure 1/2 time 72 ms    MV valve area p 1/2 method 3.06     TR Peak Trae 2.8 m/s    Triscuspid Valve Regurgitation Peak Gradient 32.0 mmHg    Ao root 3.30 cm    Asc Ao 4.2 cm    Aortic valve mean velocity 14.40 m/s    Tricuspid valve peak regurgitation velocity 2.84 m/s     Left ventricular stroke volume (2D) 73.00 mL    IVS 1.3 cm    LEFT VENTRICLE SYSTOLIC VOLUME (MOD BIPLANE) 2D 49 mL    LV DIASTOLIC VOLUME (MOD BIPLANE) 2D 122 mL    Left Atrium Area-systolic Four Chamber 37.3 cm2    Left Atrium Area-systolic Apical Two Chamber 33.3 cm2    LVSV, 2D 73 mL    LVOT area 3.14 cm2    DVI 0.76     AV valve area 2.72 cm2    MV valve area by continuity eq 2.51 cm2    LV EF 65     Est. RA pres 3.0 mmHg    Right Ventricular Peak Systolic Pressure 35.00 mmHg   Fingerstick Glucose (POCT)    Collection Time: 02/20/24  4:37 PM   Result Value Ref Range    POC Glucose 55 (L) 65 - 140 mg/dl   Fingerstick Glucose (POCT)    Collection Time: 02/20/24  5:06 PM   Result Value Ref Range    POC Glucose 54 (L) 65 - 140 mg/dl   Fingerstick Glucose (POCT)    Collection Time: 02/20/24  5:20 PM   Result Value Ref Range    POC Glucose 91 65 - 140 mg/dl   Fingerstick Glucose (POCT)    Collection Time: 02/20/24  8:42 PM   Result Value Ref Range    POC Glucose 186 (H) 65 - 140 mg/dl   Comprehensive metabolic panel    Collection Time: 02/21/24  5:02 AM   Result Value Ref Range    Sodium 136 135 - 147 mmol/L    Potassium 3.9 3.5 - 5.3 mmol/L    Chloride 106 96 - 108 mmol/L    CO2 25 21 - 32 mmol/L    ANION GAP 5 mmol/L    BUN 11 5 - 25 mg/dL    Creatinine 0.80 0.60 - 1.30 mg/dL    Glucose 63 (L) 65 - 140 mg/dL    Calcium 8.2 (L) 8.4 - 10.2 mg/dL    Corrected Calcium 9.2 8.3 - 10.1 mg/dL    AST 68 (H) 13 - 39 U/L    ALT 41 7 - 52 U/L    Alkaline Phosphatase 219 (H) 34 - 104 U/L    Total Protein 6.0 (L) 6.4 - 8.4 g/dL    Albumin 2.8 (L) 3.5 - 5.0 g/dL    Total Bilirubin 2.79 (H) 0.20 - 1.00 mg/dL    eGFR 90 ml/min/1.73sq m   Fingerstick Glucose (POCT)    Collection Time: 02/21/24  7:43 AM   Result Value Ref Range    POC Glucose 64 (L) 65 - 140 mg/dl   Fingerstick Glucose (POCT)    Collection Time: 02/21/24  9:04 AM   Result Value Ref Range    POC Glucose 119 65 - 140 mg/dl       MRI foot/forefoot toes left w wo  contrast    Result Date: 2/21/2024  Narrative: MRI FOOT/FOREFOOT TOES LEFT W WO CONTRAST INDICATION:   Osteomyelitis (pending surgical decision). COMPARISON: Plain film 2/19/2024. TECHNIQUE:  Multiplanar/multisequence MR of the left foot was performed both pre and post IV contrast. IV Contrast:  11 mL of Gadobutrol injection (SINGLE-DOSE) FINDINGS: Motion degradation considerably limits assessment. SUBCUTANEOUS TISSUES: Skin ulceration lateral aspect of the fourth digit with associated subcutaneous edema indicating cellulitis. No abscess collection identified. Diffuse forefoot subcutaneous edema indicating nonspecific cellulitis. BONES: Increased T2 signal identified throughout the fourth proximal phalanx with T1 replacement noted at the mid diaphysis and head of the bone concerning for osteomyelitis. There is corresponding post gadolinium enhancement involving the fourth proximal phalanx as well. Midfoot degenerative change with mild pes planus deformity suggestive of neuropathic changes. FIRST MTP JOINT: Prominent degenerative change with hallux rigidus deformity noted. SESAMOID BONES:  Intact. OTHER ARTICULAR SURFACES:  Normal. PLANTAR FASCIA:  Intact. LISFRANC LIGAMENT:  Intact. FOREFOOT TENDONS:  Intact. INTERMETATARSAL REGIONS:  No bursitis or Hedrick's neuroma. MUSCULATURE: Intact.     Impression: 1. Skin ulceration lateral aspect of the fourth proximal toe with marrow changes fourth proximal phalanx including T1 replacement signal and post gadolinium enhancement concerning for osteomyelitis. 2. Prominent first MTP degenerative change with hallux rigidus. The study was marked in EPIC for immediate notification. Workstation performed: [id number]     EGD    Result Date: 2/20/2024  Narrative: Table formatting from the original result was not included. [facility] [address] [facility] [id number] [phone] DATE OF SERVICE: 2/20/24 PHYSICIAN(S): Attending: Geraldine Ulloa MD  Fellow: DO Andrew Draper MD INDICATION: Anemia POST-OP DIAGNOSIS: See the impression below. PREPROCEDURE: Informed consent was obtained for the procedure, including sedation.  Risks of perforation, hemorrhage, adverse drug reaction and aspiration were discussed. The patient was placed in the left lateral decubitus position. Patient was explained about the risks and benefits of the procedure. Risks including but not limited to bleeding, infection, and perforation were explained in detail. Also explained about less than 100% sensitivity with the exam and other alternatives. PROCEDURE: EGD DETAILS OF PROCEDURE: Patient was taken to the procedure room where a time out was performed to confirm correct patient and correct procedure. The patient underwent monitored anesthesia care, which was administered by an anesthesia professional. The patient's blood pressure, heart rate, level of consciousness, respirations, oxygen and ETCO2 were monitored throughout the procedure. The scope was introduced through the mouth and advanced to the second part of the duodenum. Retroflexion was performed in the fundus. The patient experienced no blood loss. The procedure was not difficult. The patient tolerated the procedure well. There were no apparent adverse events. 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  RECOMMENDATION:  Schedule repeat EGD  Abnormal pathology    Geraldine Ulloa MD     Echo complete w/ contrast if indicated    Result Date: 2/20/2024  Narrative:   Left Ventricle: Left ventricular cavity size is normal. Wall thickness is mildly increased. There is mild concentric hypertrophy. The left ventricular ejection fraction is 65%. Systolic function is normal. Wall motion is normal. Diastolic function is moderately abnormal, consistent with grade II (pseudonormal) relaxation.   Right Ventricle: Right ventricular cavity size is mildly dilated. Systolic function is normal.   Left Atrium: The atrium is severely dilated (>48 mL/m2).   Right Atrium: The atrium is moderately dilated.   Aortic Valve: The aortic valve velocity is increased due to increased flow.   Mitral Valve: There is moderate annular calcification. There is no evidence of stenosis. The mitral valve mean gradient is increased due to flow.   Tricuspid Valve: There is mild to moderate regurgitation. The right ventricular systolic pressure is mildly elevated. The estimated right ventricular systolic pressure is 35.00 mmHg.   Aorta: The aortic root is normal in size. The ascending aorta is mildly dilated at 4.2 cm/1.8 cm/m2.     MRI abdomen w wo contrast and mrcp    Result Date: 2/20/2024  Narrative: MRI OF THE ABDOMEN WITH AND WITHOUT CONTRAST WITH MRCP INDICATION: 68 years / Male. Follow up LI-RADS 3 lesions. COMPARISON: MRI abdomen 10/23/2023 TECHNIQUE: Multiplanar/multisequence MRI of the abdomen with 3D MRCP was performed before and after administration of contrast. Imaging performed on 1.5T MRI. IV Contrast: 10 mL of Gadobutrol injection (SINGLE-DOSE) FINDINGS: Evaluation is suboptimal in presence of ascites due to dielectric artifact and due to large body habitus. LOWER CHEST: Small bilateral pleural effusions. There is a 2.4 x 0.6 cm T1 hyperintense lesion along the left chest wall (series 12 image 13) with mild postcontrast enhancement  "(series 15 image 28). It was likely present on prior CT 12/19/2023 but not visualized on MRI abdomen 10/23/2023 LIVER: Cirrhotic morphology. The liver is normal in size on current examination. Examination for focal hepatic lesions is markedly suboptimal due to factors described above observation: 1 Size: 1.8 x 1.4 cm series 13 image 41, previously 1.8 x 1.5 cm Location: Segment 4b Enhancement: Nonrim arterial phase hyperenhancement. Nonperipheral \"washout\": Not evaluable Enhancing \"capsule\": No Threshold growth: No LI-RADS Category: LR-3.. Previously seen observation #2 measuring 0.9 cm in segment 2 and #3 measuring 1.0 cm in segment 6 are not visualized on current suboptimal examination, likely secondary to their smaller size observation: 2 Size: 1.8 x 1.3 series 13 image 18, previously 1.9 x 1.3 cm Location: Segment 4a Enhancement: Nonrim arterial phase hyperenhancement. Nonperipheral \"washout\": No Enhancing \"capsule\": No Threshold growth: No LI-RADS Category: LR-3.. Patent hepatic and portal veins. BILE DUCTS: The study was marked in EPIC for significant notification. Intrahepatic biliary ductal dilation. Extrahepatic bile duct is not visualized on provided nondiagnostic MRCP images or the known MRCP images, limiting evaluation.. GALLBLADDER: Cholelithiasis without findings of acute cholecystitis. PANCREAS: Unremarkable. ADRENAL GLANDS: Unremarkable. SPLEEN: Significant splenomegaly measuring up to 19 cm. There are 2 subcentimeter lesions within the spleen with arterial hyperenhancement (series 13 image 190). These appear to be isointense on delayed postcontrast images. These are not definitely identified on precontrast T1 and T2-weighted images. Findings can represent either perfusional variants or small hemangiomas. KIDNEYS/PROXIMAL URETERS: No hydroureteronephrosis. No suspicious renal mass. BOWEL: No dilated loops of bowel. PERITONEUM/RETROPERITONEUM: Small volume ascites. Nondiagnostic assessment of the " retroperitoneum and central abdominal cavity due to dielectric artifact LYMPH NODES: No abdominal lymphadenopathy. VESSELS: No aneurysm. Partially visualized is fat stranding around the origin of the inferior mesenteric artery, as also demonstrated on prior CT 12/19/2023. ABDOMINAL WALL: Small umbilical hernia containing fat and ascitic fluid. BONES: No suspicious osseous lesion.     Impression: Markedly limited examination due to dielectric artifact from presence of ascites and other patient related factors. Grossly stable size of  two hepatic segment 4 LI-RADS 3 observations which are incompletely characterized on current examination. Nonvisualization of additional 2 smaller LI-RADS 3 observations in hepatic segments 2 and 6 respectively, as seen on prior MRI abdomen 10/23/2023. Follow-up MRI abdomen with and without contrast in 3 months is recommended for better assessment Cirrhosis, splenomegaly and ascites, compatible with portal hypertension. Main portal vein is patent New small bilateral pleural effusions A 2.4 x 0.6 cm T1 hyperintense enhancing lesion along the left chest wall, new since MRI abdomen 10/23/2023 and likely present on CT 12/19/2023, likely representing reactive lymph node and less likely a nerve sheath tumor such as schwannoma. Attention on  follow-up imaging is recommended to assess for stability/resolution. 2 subcentimeter incompletely characterized splenic lesions with postcontrast hyperenhancement and hypoenhancement on delayed images. These may represent perfusional variants or small hemangiomas. Attention on follow-up imaging is recommended for better characterization. The study was marked in EPIC for significant notification. Workstation performed: [id number]     XR foot 3+ vw left    Result Date: 2/20/2024  Narrative: XR FOOT 3+ VW LEFT INDICATION: r/o OM. COMPARISON: 11/19/2023 FINDINGS: No acute fracture or dislocation. Stable chronic subluxation dislocation at the second and fifth MTP  joint. Hallux valgus, metatarsus varus deformity. Degenerative changes in the first metatarsophalangeal joint. There is focal osteopenia and poor cortical definition of the fifth middle and distal phalanx. Cannot exclude osteomyelitis. Consider MRI for further evaluation. Forefoot soft tissue swelling.     Impression: There is focal osteopenia and poor cortical definition of the fifth middle and distal phalanx. Cannot exclude osteomyelitis. Consider MRI for further evaluation. The study was marked in EPIC for significant notification. Workstation performed: [id number]      INPATIENT Paracentesis    Result Date: 2/19/2024  Narrative: Ultrasound-guided paracentesis Clinical History: Abdominal ascites Procedure: After explaining the risks and benefits of the procedure to the patient, informed consent was obtained. Ultrasound used to localize the right lower quadrant ascites. The overlying skin was prepped and draped in usual sterile fashion and local anesthesia was obtained with the 1% lidocaine solution. A 5 Uzbek Yueh needle was advanced until fluid was aspirated under live ultrasound guidance. Approximately 1800 cc' s of cloudy, yellow fluid was aspirated. Specimens obtained/sent The patient tolerated the procedure well and left the department in stable condition.     Impression: Impression: Ultrasound-guided right paracentesis. Signed, performed, and dictated by AMOR Rueda under the supervision of Dr. Looney. Workstation performed: [id number]      VAS VENOUS DUPLEX - LOWER LIMB BILATERAL    Result Date: 2/19/2024  Narrative:  [facility] REPORT CLINICAL: Indications: Patient presents with bilateral lower extremity swelling. Operative History: No prior cardiovascular surgeries Risk Factors The patient has history of Obesity, HTN, Diabetes (IDDM) and previous smoking (quit >10yrs ago).   CONCLUSION:  Impression:  RIGHT LOWER LIMB: No evidence of acute or chronic deep vein thrombosis. No evidence  of superficial thrombophlebitis noted. Doppler evaluation shows a normal response to augmentation maneuvers.. Popliteal, posterior tibial and anterior tibial arterial Doppler waveform's are triphasic.  LEFT LOWER LIMB: No evidence of acute or chronic deep vein thrombosis. No evidence of superficial thrombophlebitis noted. Doppler evaluation shows a normal response to augmentation maneuvers. Popliteal, posterior tibial and anterior tibial arterial Doppler waveform's are triphasic.  Tech Note: There are two echogenic structures located in the bilateral inguinal region measuring approximately 2.14 cm on the right and 3.56 cm on the left suggestive of enlarged lymphatic channels.  Tech note: A portion of the study was performed by current DMS student under direct supervision from a registered senior vascular technologist with approval from patient.  SIGNATURE: Electronically Signed by: LOUIE CASAS MD, RPVI on 2024-02-19 01:00:45 PM    XR chest 2 views    Result Date: 2/19/2024  Narrative: XR CHEST PA & LATERAL INDICATION: Dyspnea.. COMPARISON: 12/20/2023 FINDINGS: Pulmonary vascular congestion. Small pleural effusions. No pneumothorax. Enlarged cardiac silhouette, unchanged. No acute osseous abnormality within the limitations of portable radiography. Left reverse shoulder arthroplasty. Normal upper abdomen.     Impression: Pulmonary vascular congestion. Small pleural effusions. Enlarged cardiac silhouette. Findings concur with the preliminary report by the referring clinician already in PACS and/or our electronic record EPIC. Workstation performed: [id number]      bedside procedure    Result Date: 2/19/2024  Narrative: [id number]      I have personally reviewed labs, imaging studies, and pertinent reports.      This note has been generated by voice recognition software system.  Therefore, there may be spelling, grammar, and or syntax errors. Please contact if questions arise.     [place]ston  DO Osito   Hematology and Medical Oncology - PGY IV  [facility]

## 2024-02-22 LAB
ALBUMIN SERPL BCP-MCNC: 2.7 G/DL (ref 3.5–5)
ALP SERPL-CCNC: 214 U/L (ref 34–104)
ALT SERPL W P-5'-P-CCNC: 43 U/L (ref 7–52)
ANION GAP SERPL CALCULATED.3IONS-SCNC: 8 MMOL/L
AST SERPL W P-5'-P-CCNC: 92 U/L (ref 13–39)
BACTERIA SPEC BFLD CULT: NO GROWTH
BILIRUB SERPL-MCNC: 2.25 MG/DL (ref 0.2–1)
BUN SERPL-MCNC: 13 MG/DL (ref 5–25)
CALCIUM ALBUM COR SERPL-MCNC: 9.1 MG/DL (ref 8.3–10.1)
CALCIUM SERPL-MCNC: 8.1 MG/DL (ref 8.4–10.2)
CHLORIDE SERPL-SCNC: 105 MMOL/L (ref 96–108)
CO2 SERPL-SCNC: 23 MMOL/L (ref 21–32)
CREAT SERPL-MCNC: 0.85 MG/DL (ref 0.6–1.3)
ERYTHROCYTE [DISTWIDTH] IN BLOOD BY AUTOMATED COUNT: 22.6 % (ref 11.6–15.1)
GFR SERPL CREATININE-BSD FRML MDRD: 88 ML/MIN/1.73SQ M
GLUCOSE SERPL-MCNC: 170 MG/DL (ref 65–140)
GLUCOSE SERPL-MCNC: 183 MG/DL (ref 65–140)
GLUCOSE SERPL-MCNC: 196 MG/DL (ref 65–140)
GLUCOSE SERPL-MCNC: 75 MG/DL (ref 65–140)
GRAM STN SPEC: NORMAL
HCT VFR BLD AUTO: 27.3 % (ref 36.5–49.3)
HGB BLD-MCNC: 7.4 G/DL (ref 12–17)
MCH RBC QN AUTO: 23.2 PG (ref 26.8–34.3)
MCHC RBC AUTO-ENTMCNC: 27.1 G/DL (ref 31.4–37.4)
MCV RBC AUTO: 86 FL (ref 82–98)
PLATELET # BLD AUTO: 45 THOUSANDS/UL (ref 149–390)
POTASSIUM SERPL-SCNC: 4.4 MMOL/L (ref 3.5–5.3)
PROT SERPL-MCNC: 5.4 G/DL (ref 6.4–8.4)
RBC # BLD AUTO: 3.19 MILLION/UL (ref 3.88–5.62)
SODIUM SERPL-SCNC: 136 MMOL/L (ref 135–147)
WBC # BLD AUTO: 3.03 THOUSAND/UL (ref 4.31–10.16)

## 2024-02-22 PROCEDURE — 88305 TISSUE EXAM BY PATHOLOGIST: CPT | Performed by: STUDENT IN AN ORGANIZED HEALTH CARE EDUCATION/TRAINING PROGRAM

## 2024-02-22 PROCEDURE — 99232 SBSQ HOSP IP/OBS MODERATE 35: CPT | Performed by: FAMILY MEDICINE

## 2024-02-22 PROCEDURE — 85027 COMPLETE CBC AUTOMATED: CPT | Performed by: PHYSICIAN ASSISTANT

## 2024-02-22 PROCEDURE — 88341 IMHCHEM/IMCYTCHM EA ADD ANTB: CPT | Performed by: STUDENT IN AN ORGANIZED HEALTH CARE EDUCATION/TRAINING PROGRAM

## 2024-02-22 PROCEDURE — 99222 1ST HOSP IP/OBS MODERATE 55: CPT | Performed by: SURGERY

## 2024-02-22 PROCEDURE — 99232 SBSQ HOSP IP/OBS MODERATE 35: CPT | Performed by: PODIATRIST

## 2024-02-22 PROCEDURE — 99223 1ST HOSP IP/OBS HIGH 75: CPT | Performed by: INTERNAL MEDICINE

## 2024-02-22 PROCEDURE — 88112 CYTOPATH CELL ENHANCE TECH: CPT | Performed by: STUDENT IN AN ORGANIZED HEALTH CARE EDUCATION/TRAINING PROGRAM

## 2024-02-22 PROCEDURE — 88342 IMHCHEM/IMCYTCHM 1ST ANTB: CPT | Performed by: STUDENT IN AN ORGANIZED HEALTH CARE EDUCATION/TRAINING PROGRAM

## 2024-02-22 PROCEDURE — 80053 COMPREHEN METABOLIC PANEL: CPT | Performed by: PHYSICIAN ASSISTANT

## 2024-02-22 PROCEDURE — 82948 REAGENT STRIP/BLOOD GLUCOSE: CPT

## 2024-02-22 PROCEDURE — 99232 SBSQ HOSP IP/OBS MODERATE 35: CPT | Performed by: INTERNAL MEDICINE

## 2024-02-22 RX ORDER — LEVOFLOXACIN 5 MG/ML
750 INJECTION, SOLUTION INTRAVENOUS EVERY 24 HOURS
Status: DISCONTINUED | OUTPATIENT
Start: 2024-02-22 | End: 2024-02-23

## 2024-02-22 RX ORDER — FLUTICASONE PROPIONATE 50 MCG
1 SPRAY, SUSPENSION (ML) NASAL DAILY
Status: DISCONTINUED | OUTPATIENT
Start: 2024-02-22 | End: 2024-02-28 | Stop reason: HOSPADM

## 2024-02-22 RX ORDER — FUROSEMIDE 10 MG/ML
40 INJECTION INTRAMUSCULAR; INTRAVENOUS
Status: DISCONTINUED | OUTPATIENT
Start: 2024-02-22 | End: 2024-02-23

## 2024-02-22 RX ADMIN — FUROSEMIDE 40 MG: 10 INJECTION, SOLUTION INTRAMUSCULAR; INTRAVENOUS at 17:10

## 2024-02-22 RX ADMIN — INSULIN LISPRO 1 UNITS: 100 INJECTION, SOLUTION INTRAVENOUS; SUBCUTANEOUS at 17:10

## 2024-02-22 RX ADMIN — LEVOFLOXACIN 750 MG: 750 INJECTION, SOLUTION INTRAVENOUS at 20:31

## 2024-02-22 RX ADMIN — B-COMPLEX W/ C & FOLIC ACID TAB 1 TABLET: TAB at 10:24

## 2024-02-22 RX ADMIN — INSULIN LISPRO 1 UNITS: 100 INJECTION, SOLUTION INTRAVENOUS; SUBCUTANEOUS at 20:42

## 2024-02-22 RX ADMIN — FLUTICASONE PROPIONATE 1 SPRAY: 50 SPRAY, METERED NASAL at 13:26

## 2024-02-22 RX ADMIN — TRAZODONE HYDROCHLORIDE 50 MG: 50 TABLET ORAL at 23:23

## 2024-02-22 RX ADMIN — TAMSULOSIN HYDROCHLORIDE 0.4 MG: 0.4 CAPSULE ORAL at 17:10

## 2024-02-22 RX ADMIN — SPIRONOLACTONE 50 MG: 50 TABLET, FILM COATED ORAL at 10:24

## 2024-02-22 RX ADMIN — INSULIN LISPRO 1 UNITS: 100 INJECTION, SOLUTION INTRAVENOUS; SUBCUTANEOUS at 13:26

## 2024-02-22 NOTE — PROGRESS NOTES
Progress Note- Isael Avendano 68 y.o. male MRN: [id number]    Unit/Bed#: NW8 861-02 Encounter: [id number]      Assessment and Plan:    Alcohol Cirrhosis (MELD-Na 14): Patient with history of alcohol cirrhosis.  Previously well compensated but complicated by presence of LI-RADS 3 lesions and medium size esophageal varices. No prior history of HE or ascites.  Now presenting with pancytopenia and ascites with lower extremity edema. S/P IR paracentesis and fluid studies consistent with pHTN at this time. 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 However, patient did have medium sized varices S/P banding. Will require repeat EGD in 4 weeks to further evaluate EV but will also benefit from hepatology follow up due to presence of LI-RADS 3 lesions on MRI/MRCP and worsening AFP (58), would consider liver biopsy.      MELD 3.0: 15 at 2/22/2024  5:36 AM  MELD-Na: 14 at 2/22/2024  5:36 AM  Calculated from:  Serum Creatinine: 0.85 mg/dL (Using min of 1 mg/dL) at 2/22/2024  5:36 AM  Serum Sodium: 136 mmol/L at 2/22/2024  5:36 AM  Total Bilirubin: 2.25 mg/dL at 2/22/2024  5:36 AM  Serum Albumin: 2.7 g/dL at 2/22/2024  5:36 AM  INR(ratio): 1.42 at 2/20/2024  9:02 AM  Age at listing (hypothetical): 68 years  Sex: Male at 2/22/2024  5:36 AM    # Anemia with Pancytopenia:   Hemoglobin on admission 4.9 from baseline 9-10, platelets 47  S/P 2U pRBC with improvement and stabilization; today hgb stable at 7.4  No evidence of overt GI bleed, and no prior history of GI bleed  Hematology and oncology currently consulted, appreciate input  Status post colonoscopy in 2022, no indication to repeat at this time  S/P EGD on 2/20 with medium EV with no stigmata of recent bleed, PHG and GAVE S/P APC  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      # Ascites:   No prior history of ascites or need for paracentesis up until this admission  High SAAG and low protein consistent with pHTN as cause of ascites  Most recent  paracentesis: 2/19 - 1800 cc and 221 - 2000 cc removed; negative for SBP  Current regimen: Lasix 40 mg daily and Aldactone 50 mg daily; monitor creatinine  Home regimen: Lasix 20 mg every day   Currently on Levaquin 750 mg q 24 hours for skin infection, also covers for SBP ppx  Limit dietary sodium intake to less than 2g/day   Avoid use of ACE, ARBs, and NSAIDs     # Esophageal Varcies:   Most recent EGD: 2/20/24 - 3 medium sized EV; S/P banding x4  Current regimen: None indication due to banding   Home regimen: None  Plan for repeat EGD +/- banding in 4 weeks      # Liver Lesion:  AFP level 9/1/23 - 29.68; now increased to 58.59  MRI/MRCP 2/19/24 - Several LI-RADS 3 Observations noted   Follow up MRI Abdomen scheduled for 2/22/24  Currently followed with Oncology Dr. Martins   Will require IR liver biopsy to further evaluate liver lesions in setting of high AFP  Outpatient follow up with Hepatology     Thank you for involving us in the care of Mr. Isael Avendano. No further GI intervention warranted at this time. Follow up with Hepatology outpatient, we will help to arrange office follow up and repeat EGD. GI will sign off. Please reach out with any questions or concerns.     ______________________________________________________________________    Subjective:     Patient seen and examined at bedside.  Patient sitting up in bedside chair comfortable in no acute distress or visible discomfort.  Feels better after paracentesis yesterday.  Denies any abdominal pain.  No nausea/vomiting and tolerating her diet.  No signs of overt bleeding.  Improved shortness of breath.    Medication Administration - last 24 hours from 02/21/2024 1219 to 02/22/2024 1219         Date/Time Order Dose Route Action Action by     02/22/2024 1024 EST multivitamin stress formula tablet 1 tablet 1 tablet Oral Given Alvarado Rutledge RN     02/21/2024 1703 EST tamsulosin (FLOMAX) capsule 0.4 mg 0.4 mg Oral Given Meagan Bates RN     02/21/2024 2155 EST  "traZODone (DESYREL) tablet 50 mg 50 mg Oral Given Zafar Mejia RN     02/22/2024 1024 EST insulin lispro (HumaLOG) 100 units/mL subcutaneous injection 1-5 Units -- Subcutaneous Not Given Alvarado Rutledge RN     02/21/2024 1703 EST insulin lispro (HumaLOG) 100 units/mL subcutaneous injection 1-5 Units -- Subcutaneous Not Given Meagan Bates RN     02/21/2024 2155 EST insulin lispro (HumaLOG) 100 units/mL subcutaneous injection 1-5 Units 1 Units Subcutaneous Given Zafar Mejia, [facility]LI     02/21/2024 2155 EST levofloxacin (LEVAQUIN) tablet 750 mg 750 mg Oral Given Zafar Mejia RN     02/21/2024 2346 EST furosemide (LASIX) tablet 40 mg 40 mg Oral Given Zafar Mejia RN     02/22/2024 1024 EST spironolactone (ALDACTONE) tablet 50 mg 50 mg Oral Given Alvarado Rutledge RN     02/21/2024 1527 EST lidocaine (PF) (XYLOCAINE-MPF) 1 % injection 10 mL Infiltration Given Evelyn Willard PA-C            Objective:     Vitals: Blood pressure 113/53, pulse 90, temperature 98.2 °F (36.8 °C), temperature source Oral, resp. rate 16, height 5' 11\" (1.803 m), weight 125 kg (275 lb 9.2 oz), SpO2 97%.,Body mass index is 39.54 kg/m².    No intake or output data in the 24 hours ending 02/22/24 1219    Physical Exam  Constitutional:       General: He is not in acute distress.     Appearance: He is normal weight. He is not toxic-appearing.   HENT:      Head: Normocephalic and atraumatic.      Nose: Nose normal. No congestion.   Eyes:      General: No scleral icterus.  Cardiovascular:      Rate and Rhythm: Normal rate.      Pulses: Normal pulses.   Pulmonary:      Effort: Pulmonary effort is normal.   Abdominal:      General: Abdomen is flat. Bowel sounds are normal. There is distension.      Palpations: Abdomen is soft.      Tenderness: There is no abdominal tenderness. There is no guarding or rebound.   Musculoskeletal:         General: Normal range of motion.      Cervical back: Normal range of motion.      Right lower leg: Edema present.      " Left lower leg: Edema present.   Skin:     General: Skin is warm.      Coloration: Skin is not pale.   Neurological:      Mental Status: He is alert and oriented to person, place, and time.      Comments: No asterixis   Psychiatric:         Mood and Affect: Mood normal.         Behavior: Behavior normal.           Invasive Devices       Peripheral Intravenous Line  Duration             Peripheral IV 02/22/24 Dorsal (posterior);Right Hand <1 day                    Lab Results:  No results displayed because visit has over 200 results.          Imaging Studies: I have personally reviewed pertinent imaging studies.

## 2024-02-22 NOTE — ASSESSMENT & PLAN NOTE
Lab Results   Component Value Date    EGFR 88 02/22/2024    EGFR 90 02/21/2024    EGFR 86 02/20/2024    CREATININE 0.85 02/22/2024    CREATININE 0.80 02/21/2024    CREATININE 0.90 02/20/2024   Monitor renal function  Diuretics per GI recs as above

## 2024-02-22 NOTE — PLAN OF CARE
Problem: Potential for Falls  Goal: Patient will remain free of falls  Description: INTERVENTIONS:  - Educate patient/family on patient safety including physical limitations  - Instruct patient to call for assistance with activity   - Consult OT/PT to assist with strengthening/mobility   - Keep Call bell within reach  - Keep bed low and locked with side rails adjusted as appropriate  - Keep care items and personal belongings within reach  - Initiate and maintain comfort rounds  - Make Fall Risk Sign visible to staff  - Offer Toileting every 4 Hours, in advance of need  - Initiate/Maintain 4alarm  - Obtain necessary fall risk management equipment: 4  - Apply yellow socks and bracelet for high fall risk patients  - Consider moving patient to room near nurses station  Outcome: Progressing     Problem: RESPIRATORY - ADULT  Goal: Achieves optimal ventilation and oxygenation  Description: INTERVENTIONS:  - Assess for changes in respiratory status  - Assess for changes in mentation and behavior  - Position to facilitate oxygenation and minimize respiratory effort  - Oxygen administered by appropriate delivery if ordered  - Initiate smoking cessation education as indicated  - Encourage broncho-pulmonary hygiene including cough, deep breathe, Incentive Spirometry  - Assess the need for suctioning and aspirate as needed  - Assess and instruct to report SOB or any respiratory difficulty  - Respiratory Therapy support as indicated  Outcome: Progressing     Problem: GASTROINTESTINAL - ADULT  Goal: Minimal or absence of nausea and/or vomiting  Description: INTERVENTIONS:  - Administer IV fluids if ordered to ensure adequate hydration  - Maintain NPO status until nausea and vomiting are resolved  - Nasogastric tube if ordered  - Administer ordered antiemetic medications as needed  - Provide nonpharmacologic comfort measures as appropriate  - Advance diet as tolerated, if ordered  - Consider nutrition services referral to assist  patient with adequate nutrition and appropriate food choices  Outcome: Progressing  Goal: Maintains or returns to baseline bowel function  Description: INTERVENTIONS:  - Assess bowel function  - Encourage oral fluids to ensure adequate hydration  - Administer IV fluids if ordered to ensure adequate hydration  - Administer ordered medications as needed  - Encourage mobilization and activity  - Consider nutritional services referral to assist patient with adequate nutrition and appropriate food choices  Outcome: Progressing  Goal: Maintains adequate nutritional intake  Description: INTERVENTIONS:  - Monitor percentage of each meal consumed  - Identify factors contributing to decreased intake, treat as appropriate  - Assist with meals as needed  - Monitor I&O, weight, and lab values if indicated  - Obtain nutrition services referral as needed  Outcome: Progressing  Goal: Oral mucous membranes remain intact  Description: INTERVENTIONS  - Assess oral mucosa and hygiene practices  - Implement preventative oral hygiene regimen  - Implement oral medicated treatments as ordered  - Initiate Nutrition services referral as needed  Outcome: Progressing     Problem: SKIN/TISSUE INTEGRITY - ADULT  Goal: Incision(s), wounds(s) or drain site(s) healing without S/S of infection  Description: INTERVENTIONS  - Assess and document dressing, incision, wound bed, drain sites and surrounding tissue  - Provide patient and family education  - Perform skin care/dressing changes every 4  Outcome: Progressing     Problem: HEMATOLOGIC - ADULT  Goal: Maintains hematologic stability  Description: INTERVENTIONS  - Assess for signs and symptoms of bleeding or hemorrhage  - Monitor labs  - Administer supportive blood products/factors as ordered and appropriate  Outcome: Progressing     Problem: PAIN - ADULT  Goal: Verbalizes/displays adequate comfort level or baseline comfort level  Description: Interventions:  - Encourage patient to monitor pain and  request assistance  - Assess pain using appropriate pain scale  - Administer analgesics based on type and severity of pain and evaluate response  - Implement non-pharmacological measures as appropriate and evaluate response  - Consider cultural and social influences on pain and pain management  - Notify physician/advanced practitioner if interventions unsuccessful or patient reports new pain  Outcome: Progressing     Problem: INFECTION - ADULT  Goal: Absence or prevention of progression during hospitalization  Description: INTERVENTIONS:  - Assess and monitor for signs and symptoms of infection  - Monitor lab/diagnostic results  - Monitor all insertion sites, i.e. indwelling lines, tubes, and drains  - Monitor endotracheal if appropriate and nasal secretions for changes in amount and color  - [place] appropriate cooling/warming therapies per order  - Administer medications as ordered  - Instruct and encourage patient and family to use good hand hygiene technique  - Identify and instruct in appropriate isolation precautions for identified infection/condition  Outcome: Progressing     Problem: SAFETY ADULT  Goal: Patient will remain free of falls  Description: INTERVENTIONS:  - Educate patient/family on patient safety including physical limitations  - Instruct patient to call for assistance with activity   - Consult OT/PT to assist with strengthening/mobility   - Keep Call bell within reach  - Keep bed low and locked with side rails adjusted as appropriate  - Keep care items and personal belongings within reach  - Initiate and maintain comfort rounds  - Make Fall Risk Sign visible to staff  - Offer Toileting every 4 Hours, in advance of need  - Initiate/Maintain 4alarm  - Obtain necessary fall risk management equipment: 4  - Apply yellow socks and bracelet for high fall risk patients  - Consider moving patient to room near nurses station  Outcome: Progressing  Goal: Maintain or return to baseline ADL  function  Description: INTERVENTIONS:  -  Assess patient's ability to carry out ADLs; assess patient's baseline for ADL function and identify physical deficits which impact ability to perform ADLs (bathing, care of mouth/teeth, toileting, grooming, dressing, etc.)  - Assess/evaluate cause of self-care deficits   - Assess range of motion  - Assess patient's mobility; develop plan if impaired  - Assess patient's need for assistive devices and provide as appropriate  - Encourage maximum independence but intervene and supervise when necessary  - Involve family in performance of ADLs  - Assess for home care needs following discharge   - Consider OT consult to assist with ADL evaluation and planning for discharge  - Provide patient education as appropriate  Outcome: Progressing  Goal: Maintains/Returns to pre admission functional level  Description: INTERVENTIONS:  - Perform AM-PAC 6 Click Basic Mobility/ Daily Activity assessment daily.  - Set and communicate daily mobility goal to care team and patient/family/caregiver.   - Collaborate with rehabilitation services on mobility goals if consulted  - Perform Range of Motion 4 times a day.  - Reposition patient every 4 hours.  - Dangle patient 4 times a day  - Stand patient 4 times a day  - Ambulate patient 4 times a day  - Out of bed to chair 4 times a day   - Out of bed for meals 4 times a day  - Out of bed for toileting  - Record patient progress and toleration of activity level   Outcome: Progressing     Problem: DISCHARGE PLANNING  Goal: Discharge to home or other facility with appropriate resources  Description: INTERVENTIONS:  - Identify barriers to discharge w/patient and caregiver  - Arrange for needed discharge resources and transportation as appropriate  - Identify discharge learning needs (meds, wound care, etc.)  - Arrange for interpretive services to assist at discharge as needed  - Refer to Case Management Department for coordinating discharge planning if the  patient needs post-hospital services based on physician/advanced practitioner order or complex needs related to functional status, cognitive ability, or social support system  Outcome: Progressing     Problem: Knowledge Deficit  Goal: Patient/family/caregiver demonstrates understanding of disease process, treatment plan, medications, and discharge instructions  Description: Complete learning assessment and assess knowledge base.  Interventions:  - Provide teaching at level of understanding  - Provide teaching via preferred learning methods  Outcome: Progressing     Problem: Nutrition/Hydration-ADULT  Goal: Nutrient/Hydration intake appropriate for improving, restoring or maintaining nutritional needs  Description: Monitor and assess patient's nutrition/hydration status for malnutrition. Collaborate with interdisciplinary team and initiate plan and interventions as ordered.  Monitor patient's weight and dietary intake as ordered or per policy. Utilize nutrition screening tool and intervene as necessary. Determine patient's food preferences and provide high-protein, high-caloric foods as appropriate.     INTERVENTIONS:  - Monitor oral intake, urinary output, labs, and treatment plans  - Assess nutrition and hydration status and recommend course of action  - Evaluate amount of meals eaten  - Assist patient with eating if necessary   - Allow adequate time for meals  - Recommend/ encourage appropriate diets, oral nutritional supplements, and vitamin/mineral supplements  - Order, calculate, and assess calorie counts as needed  - Recommend, monitor, and adjust tube feedings and TPN/PPN based on assessed needs  - Assess need for intravenous fluids  - Provide specific nutrition/hydration education as appropriate  - Include patient/family/caregiver in decisions related to nutrition  Outcome: Progressing

## 2024-02-22 NOTE — PROGRESS NOTES
"[facility] Podiatry - Progress Note  Patient: Isael Avendano 68 y.o. male   MRN: [id number]  PCP: Collin Marcos MD  Unit/Bed#: NW8 861-02 Encounter: [id number]  Date Of Visit: 24    ASSESSMENT:    Isael Avendano is a 68 y.o. male with:    Left 4th interdigital wounds   Type II diabetes mellitus   CKD stage III     PLAN:    Dressings changed at bedside today. Left foot wounds appear stable.   Left foot MRI reviewed. Agree with the final read, Skin ulceration lateral aspect of the fourth proximal toe with marrow changes fourth proximal phalanx including T1 replacement signal and post gadolinium enhancement concerning for osteomyelitis.   As wound is not acutely infected, discussion was had with patient about treatment options including local wound care vs. 4th digit amputation. Patient made aware that the toe will likely need to be surgically removed however it is not urgent at this time as it is not acutely infected. Patient expressed an understanding of this.  Follow up with [facility] if patient is stable for surgery given co-morbidities and recent lab work.   Continue local wound care of maxorb, DSD to the left foot wounds at this time.   Wound care orders placed. Appreciate nursing assistance with dressing changes.  Elevation on green foam wedges or pillows when non-ambulatory.  Rest of care per primary team.    Weight bearing status: Weightbearing as tolerated       SUBJECTIVE:     The patient was seen, evaluated, and assessed at bedside today. The patient was awake, alert, and in no acute distress. No acute events overnight. The patient reports he is feeling well today. Patient denies N/V/F/chills/SOB/CP.      OBJECTIVE:     Vitals:   /53 (BP Location: Right arm)   Pulse 90   Temp 98.2 °F (36.8 °C) (Oral)   Resp 16   Ht 5' 11\" (1.803 m)   Wt 125 kg (275 lb 9.2 oz)   SpO2 97%   BMI 39.54 kg/m²     Temp (24hrs), Av.9 °F (36.6 °C), Min:97.5 °F (36.4 °C), Max:98.2 °F (36.8 °C)      Physical Exam: "     General:  Alert, cooperative, and in no distress.  Lower extremity exam:  Cardiovascular status at baseline.  Neurological status at baseline.  Musculoskeletal status at baseline on admission. No calf tenderness noted.     Lower extremity wound(s) as noted below:     Wound #: 1  Location: Left 4th digit  Length 1.0 cm: Width 1.0 cm: Depth 0.3 cm:   Deepest Tissue Noted in Base: Capsule  Probe to Bone: No  Peripheral Skin Description: Attached  Granulation: 0% Fibrotic Tissue: 100% Necrotic Tissue: 0%   Drainage Amount: moderate serous  Signs of Infection: No       Wound #: 2  Location: Left 5th digit  Length 0.7 cm: Width 0.4 cm: Depth 0.1 cm:   Deepest Tissue Noted in Base: subcutaneous  Probe to Bone: No  Peripheral Skin Description: Attached  Granulation: 100% Fibrotic Tissue: 0% Necrotic Tissue: 0%   Drainage Amount: minimal  Signs of Infection: No    Left lower extremity: No acute clinical signs of infection. No ascending edema or erythema noted.     Clinical Images 02/22/24:            Additional Data:     Labs:    Results from last 7 days   Lab Units 02/22/24  0536 02/21/24  1018 02/20/24  0902   WBC Thousand/uL 3.03*   < > 2.73*   HEMOGLOBIN g/dL 7.4*   < > 7.4*   HEMATOCRIT % 27.3*   < > 25.0*   PLATELETS Thousands/uL 45*   < > 40*   NEUTROS PCT %  --   --  61   LYMPHS PCT %  --   --  18   MONOS PCT %  --   --  10   EOS PCT %  --   --  10*    < > = values in this interval not displayed.     Results from last 7 days   Lab Units 02/22/24  0536   POTASSIUM mmol/L 4.4   CHLORIDE mmol/L 105   CO2 mmol/L 23   BUN mg/dL 13   CREATININE mg/dL 0.85   CALCIUM mg/dL 8.1*   ALK PHOS U/L 214*   ALT U/L 43   AST U/L 92*     Results from last 7 days   Lab Units 02/20/24  0902   INR  1.42*       * I Have Reviewed All Lab Data Listed Above.    Recent Cultures (last 7 days):     Results from last 7 days   Lab Units 02/21/24  1530 02/19/24  1537 02/18/24  1908   GRAM STAIN RESULT  1+ Polys  No bacteria seen No Polys or  "Bacteria seen No Polys or Bacteria seen   WOUND CULTURE   --   --  Few Colonies of   BODY FLUID CULTURE, STERILE  No growth No growth  --            Imaging: I have personally reviewed pertinent films in PACS  EKG, Pathology, and Other Studies: I have personally reviewed pertinent reports.      ** Please Note: Portions of the record may have been created with voice recognition software. Occasional wrong word or \"sound a like\" substitutions may have occurred due to the inherent limitations of voice recognition software. Read the chart carefully and recognize, using context, where substitutions have occurred. **      "

## 2024-02-22 NOTE — ASSESSMENT & PLAN NOTE
GI and Heme/Onc following. 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

## 2024-02-22 NOTE — CONSULTS
Consultation - Cardiology Team One  Isael Avendano 68 y.o. male MRN: [id number]  Unit/Bed#: NW8 861-02 Encounter: [id number]    Inpatient consult to Cardiology  Consult performed by: Nano Byrnes PA-C  Consult ordered by: Yajaira Boone MD        Physician Requesting Consult: Yajaira Boone MD  Reason for Consult / Principal Problem: CHF    Assessment:    1.  Preoperative cardiac risk assessment: For toe amputation.  Patient denies angina or anginal equivalent.  Troponin negative x 3.  EKG with no acute ischemic change.  Signs of volume overload likely related to cirrhosis.  Was able to ambulate the stairs at home multiple times without any chest pressure.  Over the last 2 weeks he is felt extremely fatigued in the setting of acute anemia on admission.    2.  Preserved biventricular systolic function: EF 65%, no WMA, G2DD, normal RV function, severe LA dilatation, moderate RA dilatation, mild/moderate TR.  Appear to be in decompensated heart failure    3.  Symptomatic anemia: Hemoglobin 4.9 POA s/p 2 units PRBCs.  Hemoglobin 7.4 today    4.  Cirrhosis: s/p paracentesis on 2/19 again on 2/21.  GI following.    5.  Osteomyelitis: Patient with a diabetic left foot wound found to have findings of osteomyelitis on MRI.  Podiatry following.  Possible plan for toe amputation.    6.  Essential hypertension: Average /60 spironolactone 50 mg daily and IV Lasix 40 mg BID.    7.  Type II DM: Hemoglobin A1c 7.3.  Management per primary team.    8.  CKD stage III: Baseline creatinine 0.8-1.0.  Creatinine currently at baseline.      Plan/Recommendations:  Patient does not appear to be in acute decompensated heart failure  Overload is in the setting of cirrhosis and hypoalbuminemia  Volume management per primary team  Patient acceptable risk to proceed with surgery without additional cardiac testing that would modify this risk  Is await attending attestation for final  recommendations  _____________________________________________________________________    CC: Generalized weakness      History of Present Illness   HPI: Isael Avendano is a 68 y.o. year old male who has essential hypertension, type II DM, CKD stage IIIa, history of alcoholic cirrhosis who follows with cardiologist Dr. Lizarraga at [facility].  He was last seen by cardiologist 11/2022 for preoperative cardiac risk assessment prior to left reverse total shoulder replacement.  Presented to the emergency room at [facility] on 2/18/2024 with generalized weakness.  Patient reported being on vacation for 6 weeks and had noted worsening lower extremity swelling, abdominal distention and a possible infection in his left foot.  EKG revealed sinus rhythm with nonspecific inferior T wave abnormality.  CXR revealed pulmonary vascular congestion with small pleural effusions.  Bilateral lower extremity venous duplex was negative for DVT.  X-ray of the left foot could not rule out osteomyelitis.  Labs revealed hemoglobin of 4.9, negative troponin x 3, .  She was admitted with symptomatic anemia, diabetic foot ulcer and cirrhosis.  The following day he underwent an MRI of the abdomen revealed ascites and incidental finding of a chest wall mass that could represent reactive lymph node versus schwannoma.  MRI of the left foot revealed findings concerning for osteomyelitis involving the lateral aspect of the fourth toe.  He received 2 units PRBCs.  Podiatry hematology and GI were consulted.  Patient underwent paracentesis yesterday with removal of 2 L from the right side.  Cardiology has been consulted for preoperative cardiac risk assessment and diastolic dysfunction.    Home medication regimen includes Lasix 20 mg every other day.    Patient resting in bed during consultation and denies any history of CAD or CHF.  Patient noted that in January he developed a sore on his foot.  Over the last 6 weeks while he was in  [place] he noted progressive worsening lower extremity edema and abdominal bloating urging rash and foot wound.  He has been able to ambulate stairs without any chest pressure.  Over the last 2 weeks however he noticed significant fatigue.      Echocardiogram 2/20/2024: EF 65%, no WMA, G2DD, normal RV function, severe LA dilatation, moderate RA dilatation, mild/moderate TR.    EKG reviewed personally: 2/18/2024-sinus rhythm at a rate of 98 bpm with inferior T wave abnormality.  No significant change compared to the EKG from 6/20/2023    Telemetry reviewed personally: Not on telemetry        Review of Systems   Constitutional: Negative. Negative for chills.   Cardiovascular:  Positive for leg swelling. Negative for chest pain, dyspnea on exertion, near-syncope, orthopnea, palpitations, paroxysmal nocturnal dyspnea and syncope.   Respiratory: Negative.  Negative for cough, shortness of breath and wheezing.    Endocrine: Negative.    Hematologic/Lymphatic: Negative.    Skin:  Positive for poor wound healing.   Musculoskeletal: Negative.    Gastrointestinal:  Positive for bloating. Negative for diarrhea, nausea and vomiting.   Neurological:  Negative for dizziness, light-headedness and weakness.   Psychiatric/Behavioral: Negative.  Negative for altered mental status.    All other systems reviewed and are negative.    Historical Information   Past Medical History:   Diagnosis Date    Arrhythmia     Chronic kidney disease     COVID 12/2020    CPAP (continuous positive airway pressure) dependence     Diabetes mellitus (HCC)     History of echocardiogram 11/14/2017    showed EF of 50-55 percentWith moderate LVH and left ventricle diastolic dysfunction. Left atrium was moderately enlarged. Trace MR noted.     History of Holter monitoring 11/21/2017    showed baseline rhythm of sinus origin with an average heart it of 61 bpm. The lowest heart rate was 49 and the highest heart rate was 10 8 bpm. There were rare single VPCs,  and frequent PACs representing 3.2% of total beats. There were several episodes of sinus arrhythmias with sinus bradycardia and heart rate ranging from 40-90 bpm. No sustained dysrhythmias, or pauses noted. The patient did not    History of transfusion 2023    platelets    Liver disease     cirhosis    Murmur, cardiac     Obese     Sleep apnea      Past Surgical History:   Procedure Laterality Date    CATARACT EXTRACTION      EYE SURGERY Left     FL GUIDED NEEDLE PLAC BX/ASP/INJ  2023    HERNIA REPAIR  [id number]    IR PARACENTESIS  2024    JOINT REPLACEMENT Right     TKR    KNEE ARTHROPLASTY Right 2008    SD ARTHROPLASTY GLENOHUMERAL JOINT TOTAL SHOULDER Left 2023    Procedure: ARTHROPLASTY SHOULDER REVERSE;  Surgeon: Marcelo Lester MD;  Location: [facility];  Service: Orthopedics    SD JARED SHOULDER ARTHRPLSTY HUMERAL&GLENOID COMPNT Left 2023    Procedure: removal of antibiotic spacer, incision and debridement,  with revision reverse shoulder arthroplasty;  Surgeon: Lita Aguilar;  Location: [facility];  Service: Orthopedics    SD JARED SHOULDER ARTHRPLSTY HUMERAL/GLENOID COMPNT Left 2023    Procedure: ARTHROPLASTY SHOULDER REVISION;  Surgeon: Lita Aguilar;  Location: [facility];  Service: Orthopedics    SHOULDER SURGERY Right 2002    WOUND DEBRIDEMENT Left 2023    Procedure: INCISION AND DRAINAGE (I&D) EXTREMITY, vac placement;  Surgeon: Marcelo Lester MD;  Location: [facility];  Service: Orthopedics     Social History     Substance and Sexual Activity   Alcohol Use Not Currently    Comment: quit      Social History     Substance and Sexual Activity   Drug Use Never     Social History     Tobacco Use   Smoking Status Former    Current packs/day: 0.00    Average packs/day: 0.5 packs/day for 20.0 years (10.0 ttl pk-yrs)    Types: Cigarettes    Start date:     Quit date:     Years since quittin.1   Smokeless Tobacco Never     Family  History:   Family History   Problem Relation Age of Onset    Diabetes Mother     Supraventricular tachycardia Mother     COPD Father     Heart disease Father     Other Father         Sepsis; related to UTI with complicating cardiac problems    Heart disease Paternal Grandmother     Prostate cancer Paternal Grandfather 82       Meds/Allergies   all current active meds have been reviewed, current meds:   Current Facility-Administered Medications   Medication Dose Route Frequency    acetaminophen (TYLENOL) tablet 650 mg  650 mg Oral Q6H PRN    benzonatate (TESSALON PERLES) capsule 200 mg  200 mg Oral TID PRN    dextromethorphan-guaiFENesin (ROBITUSSIN DM) oral syrup 10 mL  10 mL Oral Q4H PRN    fluticasone (FLONASE) 50 mcg/act nasal spray 1 spray  1 spray Each Nare Daily    furosemide (LASIX) injection 40 mg  40 mg Intravenous BID (diuretic)    insulin lispro (HumaLOG) 100 units/mL subcutaneous injection 1-5 Units  1-5 Units Subcutaneous TID AC    insulin lispro (HumaLOG) 100 units/mL subcutaneous injection 1-5 Units  1-5 Units Subcutaneous HS    iron sucrose (VENOFER) 300 mg in sodium chloride 0.9 % 250 mL IVPB  300 mg Intravenous Once per day on Monday Wednesday Friday    levofloxacin (LEVAQUIN) tablet 750 mg  750 mg Oral Q24H    multivitamin stress formula tablet 1 tablet  1 tablet Oral Daily    ondansetron (ZOFRAN) injection 4 mg  4 mg Intravenous Q6H PRN    spironolactone (ALDACTONE) tablet 50 mg  50 mg Oral Daily    tamsulosin (FLOMAX) capsule 0.4 mg  0.4 mg Oral Daily With Dinner    traZODone (DESYREL) tablet 50 mg  50 mg Oral HS   , and PTA meds:   Prior to Admission Medications   Prescriptions Last Dose Informant Patient Reported? Taking?   Insulin Pen Needle (BD Pen Needle Nayla 2nd Gen) 32G X 4 MM MISC  Self No No   Sig: For use with insulin pen. Pharmacy may dispense brand covered by insurance.   Lantus SoloStar 100 units/mL SOPN  Self Yes No   Sig: Inject 40 Units under the skin daily at bedtime   Multiple  "Vitamin (multivitamin) capsule  Self No No   Sig: Take 1 capsule by mouth daily   benzonatate (TESSALON) 200 MG capsule Not Taking Self No No   Sig: TAKE 1 CAPSULE BY MOUTH 3 TIMES DAILY AS NEEDED for cough   Patient not taking: Reported on 2024   furosemide (LASIX) 20 mg tablet  Self No No   Sig: Take 1 tablet (20 mg total) by mouth every other day   Patient taking differently: Take 20 mg by mouth every morning   glimepiride (AMARYL) 4 mg tablet  Self Yes No   Sig: Take 4 mg by mouth 2 (two) times a day   levofloxacin (LEVAQUIN) 750 mg tablet   Yes Yes   Sig: Take 750 mg by mouth every 24 hours   polyethylene glycol (GOLYTELY) 4000 mL solution   No No   Sig: Take 4,000 mL by mouth once for 1 dose   tamsulosin (FLOMAX) 0.4 mg  Self No No   Sig: Take 1 capsule (0.4 mg total) by mouth daily with dinner   traZODone (DESYREL) 50 mg tablet  Self Yes No   Sig: Take 50 mg by mouth daily at bedtime bedtime      Facility-Administered Medications: None          Allergies   Allergen Reactions    Sulfa Antibiotics Hives    Daptomycin Other (See Comments)     Possibly contributed to ABILIO on 2023 admission        Objective   Vitals: Blood pressure 113/53, pulse 90, temperature 98.2 °F (36.8 °C), temperature source Oral, resp. rate 16, height 5' 11\" (1.803 m), weight 125 kg (275 lb 9.2 oz), SpO2 97%.,     Body mass index is 39.54 kg/m².,     Systolic (24hrs), Av , Min:113 , Max:139     Diastolic (24hrs), Av, Min:53, Max:63    Wt Readings from Last 3 Encounters:   24 125 kg (275 lb 9.2 oz)   24 110 kg (242 lb)   23 110 kg (241 lb 8 oz)      Lab Results   Component Value Date    CREATININE 0.85 2024    CREATININE 0.80 2024    CREATININE 0.90 2024           No intake or output data in the 24 hours ending 24 1020  Weight (last 2 days)       Date/Time Weight    24 0555 125 (275.58)    24 0532 110 (242)    24 1306 110 (242)    24 0558 110 (242)      "     Invasive Devices       Peripheral Intravenous Line  Duration             Peripheral IV 02/22/24 Dorsal (posterior);Right Hand <1 day                      Physical Exam  Vitals and nursing note reviewed.   Constitutional:       General: He is not in acute distress.     Appearance: He is well-developed.      Comments: On RA in NAD   HENT:      Head: Normocephalic and atraumatic.   Neck:      Vascular: No JVD.   Cardiovascular:      Rate and Rhythm: Normal rate and regular rhythm.      Heart sounds: Normal heart sounds. No murmur heard.     No friction rub. No gallop.   Pulmonary:      Effort: Pulmonary effort is normal. No respiratory distress.      Breath sounds: Normal breath sounds. No wheezing or rales.   Chest:      Chest wall: No tenderness.   Abdominal:      General: Bowel sounds are normal. There is distension.      Palpations: Abdomen is soft.      Tenderness: There is no abdominal tenderness.   Musculoskeletal:         General: No tenderness. Normal range of motion.      Cervical back: Normal range of motion and neck supple.      Right lower leg: Edema present.      Left lower leg: Edema present.   Skin:     General: Skin is warm and dry.      Coloration: Skin is not pale.      Findings: No erythema.   Neurological:      Mental Status: He is alert and oriented to person, place, and time.   Psychiatric:         Mood and Affect: Mood normal.         Behavior: Behavior normal.         Thought Content: Thought content normal.         Judgment: Judgment normal.           LABORATORY RESULTS:      CBC with diff:   Results from last 7 days   Lab Units 02/22/24  0536 02/21/24  1018 02/20/24  0902 02/19/24  1736 02/19/24  0527 02/18/24  2248 02/18/24  1523 02/18/24  1402   WBC Thousand/uL 3.03* 2.49* 2.73*  --  3.32*  --  3.24* 3.60*   HEMOGLOBIN g/dL 7.4* 7.3* 7.4*  --  7.6* 5.7* 4.8* 4.9*   HEMATOCRIT % 27.3* 24.1* 25.0*  --  25.7* 19.3* 17.6* 17.6*   MCV fL 86 77* 79*  --  79*  --  74* 74*   PLATELETS  "Thousands/uL 45* 38* 40* 41* 45*  --  45* 47*   RBC Million/uL 3.19* 3.15* 3.17*  --  3.25*  --  2.37* 2.39*   MCH pg 23.2* 23.2* 23.3*  --  23.4*  --  20.3* 20.5*   MCHC g/dL 27.1* 30.3* 29.6*  --  29.6*  --  27.3* 27.8*   RDW % 22.6* 21.2* 19.9*  --  19.9*  --  19.0* 18.9*   NRBC AUTO /100 WBCs  --   --  0  --   --   --  1 1       CMP:  Results from last 7 days   Lab Units 02/22/24  0536 02/21/24  0502 02/20/24  0902 02/19/24  0527 02/18/24  1402   POTASSIUM mmol/L 4.4 3.9 3.6 3.2* 3.7   CHLORIDE mmol/L 105 106 108 106 106   CO2 mmol/L 23 25 30 28 28   BUN mg/dL 13 11 15 19 25   CREATININE mg/dL 0.85 0.80 0.90 0.99 1.01   CALCIUM mg/dL 8.1* 8.2* 8.3* 8.3* 8.8   AST U/L 92* 68* 56*  --  50*   ALT U/L 43 41 43  --  42   ALK PHOS U/L 214* 219* 233*  --  219*   EGFR ml/min/1.73sq m 88 90 86 76 75       BMP:  Results from last 7 days   Lab Units 02/22/24  0536 02/21/24  0502 02/20/24  0902 02/19/24  0527 02/18/24  1402   POTASSIUM mmol/L 4.4 3.9 3.6 3.2* 3.7   CHLORIDE mmol/L 105 106 108 106 106   CO2 mmol/L 23 25 30 28 28   BUN mg/dL 13 11 15 19 25   CREATININE mg/dL 0.85 0.80 0.90 0.99 1.01   CALCIUM mg/dL 8.1* 8.2* 8.3* 8.3* 8.8          Lab Results   Component Value Date    NTBNP 79 12/25/2020                              Results from last 7 days   Lab Units 02/20/24  0902 02/19/24  1736 02/18/24  1402   INR  1.42* 1.46* 1.49*     Lipid Profile:   No results found for: \"CHOL\"  No results found for: \"HDL\"  No results found for: \"LDLCALC\"  No results found for: \"TRIG\"      Cardiac testing:   No results found for this or any previous visit.    No results found for this or any previous visit.    No valid procedures specified.  No results found for this or any previous visit.        Imaging: I have personally reviewed pertinent reports.    MRI foot/forefoot toes left w wo contrast    Result Date: 2/21/2024  Narrative: MRI FOOT/FOREFOOT TOES LEFT W WO CONTRAST INDICATION:   Osteomyelitis (pending surgical decision). " COMPARISON: Plain film 2/19/2024. TECHNIQUE:  Multiplanar/multisequence MR of the left foot was performed both pre and post IV contrast. IV Contrast:  11 mL of Gadobutrol injection (SINGLE-DOSE) FINDINGS: Motion degradation considerably limits assessment. SUBCUTANEOUS TISSUES: Skin ulceration lateral aspect of the fourth digit with associated subcutaneous edema indicating cellulitis. No abscess collection identified. Diffuse forefoot subcutaneous edema indicating nonspecific cellulitis. BONES: Increased T2 signal identified throughout the fourth proximal phalanx with T1 replacement noted at the mid diaphysis and head of the bone concerning for osteomyelitis. There is corresponding post gadolinium enhancement involving the fourth proximal phalanx as well. Midfoot degenerative change with mild pes planus deformity suggestive of neuropathic changes. FIRST MTP JOINT: Prominent degenerative change with hallux rigidus deformity noted. SESAMOID BONES:  Intact. OTHER ARTICULAR SURFACES:  Normal. PLANTAR FASCIA:  Intact. LISFRANC LIGAMENT:  Intact. FOREFOOT TENDONS:  Intact. INTERMETATARSAL REGIONS:  No bursitis or Hedrick's neuroma. MUSCULATURE: Intact.     Impression: 1. Skin ulceration lateral aspect of the fourth proximal toe with marrow changes fourth proximal phalanx including T1 replacement signal and post gadolinium enhancement concerning for osteomyelitis. 2. Prominent first MTP degenerative change with hallux rigidus. The study was marked in EPIC for immediate notification. Workstation performed: [id number]     EGD    Result Date: 2/20/2024  Narrative: Table formatting from the original result was not included. [facility] [address] [facility] [id number] [phone] DATE OF SERVICE: 2/20/24 PHYSICIAN(S): Attending: Geraldine Ulloa MD Fellow: DO Andrew Draper MD INDICATION: Anemia POST-OP DIAGNOSIS: See the impression below. PREPROCEDURE: Informed consent was  obtained for the procedure, including sedation.  Risks of perforation, hemorrhage, adverse drug reaction and aspiration were discussed. The patient was placed in the left lateral decubitus position. Patient was explained about the risks and benefits of the procedure. Risks including but not limited to bleeding, infection, and perforation were explained in detail. Also explained about less than 100% sensitivity with the exam and other alternatives. PROCEDURE: EGD DETAILS OF PROCEDURE: Patient was taken to the procedure room where a time out was performed to confirm correct patient and correct procedure. The patient underwent monitored anesthesia care, which was administered by an anesthesia professional. The patient's blood pressure, heart rate, level of consciousness, respirations, oxygen and ETCO2 were monitored throughout the procedure. The scope was introduced through the mouth and advanced to the second part of the duodenum. Retroflexion was performed in the fundus. The patient experienced no blood loss. The procedure was not difficult. The patient tolerated the procedure well. There were no apparent adverse events. 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 RECOMMENDATION:  Schedule repeat EGD  Abnormal pathology    Geraldine Ulloa MD     Echo complete w/ contrast if indicated    Result Date:  2/20/2024  Narrative:   Left Ventricle: Left ventricular cavity size is normal. Wall thickness is mildly increased. There is mild concentric hypertrophy. The left ventricular ejection fraction is 65%. Systolic function is normal. Wall motion is normal. Diastolic function is moderately abnormal, consistent with grade II (pseudonormal) relaxation.   Right Ventricle: Right ventricular cavity size is mildly dilated. Systolic function is normal.   Left Atrium: The atrium is severely dilated (>48 mL/m2).   Right Atrium: The atrium is moderately dilated.   Aortic Valve: The aortic valve velocity is increased due to increased flow.   Mitral Valve: There is moderate annular calcification. There is no evidence of stenosis. The mitral valve mean gradient is increased due to flow.   Tricuspid Valve: There is mild to moderate regurgitation. The right ventricular systolic pressure is mildly elevated. The estimated right ventricular systolic pressure is 35.00 mmHg.   Aorta: The aortic root is normal in size. The ascending aorta is mildly dilated at 4.2 cm/1.8 cm/m2.     MRI abdomen w wo contrast and mrcp    Result Date: 2/20/2024  Narrative: MRI OF THE ABDOMEN WITH AND WITHOUT CONTRAST WITH MRCP INDICATION: 68 years / Male. Follow up LI-RADS 3 lesions. COMPARISON: MRI abdomen 10/23/2023 TECHNIQUE: Multiplanar/multisequence MRI of the abdomen with 3D MRCP was performed before and after administration of contrast. Imaging performed on 1.5T MRI. IV Contrast: 10 mL of Gadobutrol injection (SINGLE-DOSE) FINDINGS: Evaluation is suboptimal in presence of ascites due to dielectric artifact and due to large body habitus. LOWER CHEST: Small bilateral pleural effusions. There is a 2.4 x 0.6 cm T1 hyperintense lesion along the left chest wall (series 12 image 13) with mild postcontrast enhancement (series 15 image 28). It was likely present on prior CT 12/19/2023 but not visualized on MRI abdomen 10/23/2023 LIVER: Cirrhotic morphology. The  "liver is normal in size on current examination. Examination for focal hepatic lesions is markedly suboptimal due to factors described above observation: 1 Size: 1.8 x 1.4 cm series 13 image 41, previously 1.8 x 1.5 cm Location: Segment 4b Enhancement: Nonrim arterial phase hyperenhancement. Nonperipheral \"washout\": Not evaluable Enhancing \"capsule\": No Threshold growth: No LI-RADS Category: LR-3.. Previously seen observation #2 measuring 0.9 cm in segment 2 and #3 measuring 1.0 cm in segment 6 are not visualized on current suboptimal examination, likely secondary to their smaller size observation: 2 Size: 1.8 x 1.3 series 13 image 18, previously 1.9 x 1.3 cm Location: Segment 4a Enhancement: Nonrim arterial phase hyperenhancement. Nonperipheral \"washout\": No Enhancing \"capsule\": No Threshold growth: No LI-RADS Category: LR-3.. Patent hepatic and portal veins. BILE DUCTS: The study was marked in EPIC for significant notification. Intrahepatic biliary ductal dilation. Extrahepatic bile duct is not visualized on provided nondiagnostic MRCP images or the known MRCP images, limiting evaluation.. GALLBLADDER: Cholelithiasis without findings of acute cholecystitis. PANCREAS: Unremarkable. ADRENAL GLANDS: Unremarkable. SPLEEN: Significant splenomegaly measuring up to 19 cm. There are 2 subcentimeter lesions within the spleen with arterial hyperenhancement (series 13 image 190). These appear to be isointense on delayed postcontrast images. These are not definitely identified on precontrast T1 and T2-weighted images. Findings can represent either perfusional variants or small hemangiomas. KIDNEYS/PROXIMAL URETERS: No hydroureteronephrosis. No suspicious renal mass. BOWEL: No dilated loops of bowel. PERITONEUM/RETROPERITONEUM: Small volume ascites. Nondiagnostic assessment of the retroperitoneum and central abdominal cavity due to dielectric artifact LYMPH NODES: No abdominal lymphadenopathy. VESSELS: No aneurysm. Partially " visualized is fat stranding around the origin of the inferior mesenteric artery, as also demonstrated on prior CT 12/19/2023. ABDOMINAL WALL: Small umbilical hernia containing fat and ascitic fluid. BONES: No suspicious osseous lesion.     Impression: Markedly limited examination due to dielectric artifact from presence of ascites and other patient related factors. Grossly stable size of  two hepatic segment 4 LI-RADS 3 observations which are incompletely characterized on current examination. Nonvisualization of additional 2 smaller LI-RADS 3 observations in hepatic segments 2 and 6 respectively, as seen on prior MRI abdomen 10/23/2023. Follow-up MRI abdomen with and without contrast in 3 months is recommended for better assessment Cirrhosis, splenomegaly and ascites, compatible with portal hypertension. Main portal vein is patent New small bilateral pleural effusions A 2.4 x 0.6 cm T1 hyperintense enhancing lesion along the left chest wall, new since MRI abdomen 10/23/2023 and likely present on CT 12/19/2023, likely representing reactive lymph node and less likely a nerve sheath tumor such as schwannoma. Attention on  follow-up imaging is recommended to assess for stability/resolution. 2 subcentimeter incompletely characterized splenic lesions with postcontrast hyperenhancement and hypoenhancement on delayed images. These may represent perfusional variants or small hemangiomas. Attention on follow-up imaging is recommended for better characterization. The study was marked in EPIC for significant notification. Workstation performed: [id number]     XR foot 3+ vw left    Result Date: 2/20/2024  Narrative: XR FOOT 3+ VW LEFT INDICATION: r/o OM. COMPARISON: 11/19/2023 FINDINGS: No acute fracture or dislocation. Stable chronic subluxation dislocation at the second and fifth MTP joint. Hallux valgus, metatarsus varus deformity. Degenerative changes in the first metatarsophalangeal joint. There is focal osteopenia and poor  cortical definition of the fifth middle and distal phalanx. Cannot exclude osteomyelitis. Consider MRI for further evaluation. Forefoot soft tissue swelling.     Impression: There is focal osteopenia and poor cortical definition of the fifth middle and distal phalanx. Cannot exclude osteomyelitis. Consider MRI for further evaluation. The study was marked in EPIC for significant notification. Workstation performed: [id number]     IR INPATIENT Paracentesis    Result Date: 2/19/2024  Narrative: Ultrasound-guided paracentesis Clinical History: Abdominal ascites Procedure: After explaining the risks and benefits of the procedure to the patient, informed consent was obtained. Ultrasound used to localize the right lower quadrant ascites. The overlying skin was prepped and draped in usual sterile fashion and local anesthesia was obtained with the 1% lidocaine solution. A 5 Chinese Yueh needle was advanced until fluid was aspirated under live ultrasound guidance. Approximately 1800 cc' s of cloudy, yellow fluid was aspirated. Specimens obtained/sent The patient tolerated the procedure well and left the department in stable condition.     Impression: Impression: Ultrasound-guided right paracentesis. Signed, performed, and dictated by AMOR Rueda under the supervision of Dr. Looney. Workstation performed: [id number]      VAS VENOUS DUPLEX - LOWER LIMB BILATERAL    Result Date: 2/19/2024  Narrative:  [facility] REPORT CLINICAL: Indications: Patient presents with bilateral lower extremity swelling. Operative History: No prior cardiovascular surgeries Risk Factors The patient has history of Obesity, HTN, Diabetes (IDDM) and previous smoking (quit >10yrs ago).   CONCLUSION:  Impression:  RIGHT LOWER LIMB: No evidence of acute or chronic deep vein thrombosis. No evidence of superficial thrombophlebitis noted. Doppler evaluation shows a normal response to augmentation maneuvers.. Popliteal, posterior tibial and  anterior tibial arterial Doppler waveform's are triphasic.  LEFT LOWER LIMB: No evidence of acute or chronic deep vein thrombosis. No evidence of superficial thrombophlebitis noted. Doppler evaluation shows a normal response to augmentation maneuvers. Popliteal, posterior tibial and anterior tibial arterial Doppler waveform's are triphasic.  Tech Note: There are two echogenic structures located in the bilateral inguinal region measuring approximately 2.14 cm on the right and 3.56 cm on the left suggestive of enlarged lymphatic channels.  Tech note: A portion of the study was performed by current DMS student under direct supervision from a registered senior vascular technologist with approval from patient.  SIGNATURE: Electronically Signed by: LOUIE CASAS MD, RPVI on 2024-02-19 01:00:45 PM    XR chest 2 views    Result Date: 2/19/2024  Narrative: XR CHEST PA & LATERAL INDICATION: Dyspnea.. COMPARISON: 12/20/2023 FINDINGS: Pulmonary vascular congestion. Small pleural effusions. No pneumothorax. Enlarged cardiac silhouette, unchanged. No acute osseous abnormality within the limitations of portable radiography. Left reverse shoulder arthroplasty. Normal upper abdomen.     Impression: Pulmonary vascular congestion. Small pleural effusions. Enlarged cardiac silhouette. Findings concur with the preliminary report by the referring clinician already in PACS and/or our electronic record EPIC. Workstation performed: [id number]      bedside procedure    Result Date: 2/19/2024  Narrative: [id number]          Counseling / Coordination of Care  Total floor / unit time spent today 45 minutes.  Greater than 50% of total time was spent with the patient and / or family counseling and / or coordination of care.  A description of the counseling / coordination of care: Review of history, current assessment, development of a plan.      Code Status: Level 1 - Full Code    ** Please Note: Dragon 360  Dictation voice to text software may have been used in the creation of this document. **

## 2024-02-22 NOTE — ASSESSMENT & PLAN NOTE
Lab Results   Component Value Date    HGBA1C 7.3 (H) 08/18/2023       Recent Labs     02/21/24  1626 02/21/24  2122 02/22/24  1102 02/22/24  1646   POCGLU 142* 152* 170* 183*       Patient has been on Levquin started in [place], was told he had contracted a bacteria from the water?   Unable to see records in [facility] from [facility]    Phone number [phone] Dr. Fong.   Podiatry following   MRI concerning for OM--no acute infection per podiatry.  Will DC antibiotics.  Discussed with podiatry does not feel like the patient needs to be on antibiotics at this point.  Surgical plan per podiatry.  Cardiology evaluation appreciated.    Patient is definitely high risk for any surgical intervention but risk are nonmodifiable

## 2024-02-22 NOTE — PROGRESS NOTES
Contacted by antibiotic stewardship.  As per podiatry patient does not need any antibiotics.  But patient also had a GI bleed with ascites.  Levofloxacin is for SBP prophylaxis we will continue with antibiotics today and will discuss with GI tomorrow regarding antibiotic duration      [facility]  Progress Note  Name: Isael Avendano I  MRN: [id number]  Unit/Bed#: NW8 861-02 I Date of Admission: 2/18/2024   Date of Service: 2/22/2024 I Hospital Day: 4    Assessment/Plan   * Symptomatic anemia  Assessment & Plan  GI and Heme/Onc following. 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 for which 4 bands were successful placed, moderate erythematous mucosa in the antrum to which coagulation was induced with argon plasma coagulation  On IV Venofer -oncology limitation appreciated  Hemoglobin remained relatively stable    Urinary retention  Assessment & Plan  Continue flomax    Murmur  Assessment & Plan  Noted, echo revealed LVE 65%, Grade 2 DD  Given diffuse edema will consult cardiology to rule out cardiac cause for edema       Stage 3a chronic kidney disease (HCC)  Assessment & Plan  Lab Results   Component Value Date    EGFR 88 02/22/2024    EGFR 90 02/21/2024    EGFR 86 02/20/2024    CREATININE 0.85 02/22/2024    CREATININE 0.80 02/21/2024    CREATININE 0.90 02/20/2024   Monitor renal function  Diuretics per GI recs as above    Diabetic ulcer of left foot associated with type 2 diabetes mellitus (HCC)  Assessment & Plan  Lab Results   Component Value Date    HGBA1C 7.3 (H) 08/18/2023       Recent Labs     02/21/24  1626 02/21/24  2122 02/22/24  1102 02/22/24  1646   POCGLU 142* 152* 170* 183*       Patient has been on Levquin started in [place], was told he had contracted a bacteria from the water?   Unable to see records in Epic from  [facility]    Phone number [phone] Dr. Fong.   Podiatry following   MRI concerning for OM--no acute infection per podiatry.  Will DC antibiotics.  Discussed with podiatry does not feel like the patient needs to be on antibiotics at this point.    Thrombocytopenia (HCC)  Assessment & Plan  Platelets are 45. 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  IV Lasix given significant edema    Essential hypertension  Assessment & Plan  BP stable, on PO spironolactone and Lasix as above    DM2 (diabetes mellitus, type 2) ([facility])  Assessment & Plan  Lab Results   Component Value Date    HGBA1C 7.3 (H) 08/18/2023       Recent Labs     02/21/24  1626 02/21/24  2122 02/22/24  1102 02/22/24  1646   POCGLU 142* 152* 170* 183*         Blood Sugar Average: Last 72 hrs:  (P) 110.95  Lantus was decreased from 40 units to 30 units the night of 2/19 and patient was hypoglycemic on 2/20 requiring dextrose pushes and a few hours of D5NS as he was NPO for EGD. Additionally, Amaryl was discontinued and Lantus was further decreased from 30 to 15 units last night. Of note, patient did not receive Lantus last night and was hypoglycemic again at 64 requiring dextorse. Will hold scheduled Lantus for now  Continue SSI and glucose checks  Hypoglycemia protocol                  VTE Pharmacologic Prophylaxis: VTE Score: 4 - contraindicated    Mobility:   Basic Mobility Inpatient Raw Score: 23  JH-HLM Goal: 7: Walk 25 feet or more  JH-HLM Achieved: 7: Walk 25 feet or more      Patient Centered Rounds: I performed bedside  rounds with nursing staff today.   Discussions with Specialists or Other Care Team Provider:     Education and Discussions with Family / Patient: Updated  (wife) at bedside.    Total Time Spent on Date of Encounter in care of patient: 35 mins. This time was spent on one or more of the following: performing physical exam; counseling and coordination of care; obtaining or reviewing history; documenting in the medical record; reviewing/ordering tests, medications or procedures; communicating with other healthcare professionals and discussing with patient's family/caregivers.    Current Length of Stay: 4 day(s)  Current Patient Status: Inpatient   Certification Statement: The patient will continue to require additional inpatient hospital stay due to pending diuresis   Discharge Plan:ode Status: Level 1 - Full Code    Subjective:   Patient seen and examined.  No events overnight  Patient still with significant edema bilateral lower extremities.  Surgical oncology consultation appreciated.  Planning for biopsy noted.    Objective:     Vitals:   Temp (24hrs), Av.9 °F (36.6 °C), Min:97.5 °F (36.4 °C), Max:98.2 °F (36.8 °C)    Temp:  [97.5 °F (36.4 °C)-98.2 °F (36.8 °C)] 97.9 °F (36.6 °C)  HR:  [] 94  Resp:  [14-16] 14  BP: (113-139)/(53-63) 131/62  SpO2:  [97 %-100 %] 100 %  Body mass index is 39.54 kg/m².     Input and Output Summary (last 24 hours):   No intake or output data in the 24 hours ending 24 6901    Physical Exam:   Physical Exam  Constitutional:       Appearance: He is obese.   HENT:      Head: Normocephalic.      Nose: Nose normal. No congestion.   Eyes:      General: No scleral icterus.  Cardiovascular:      Rate and Rhythm: Normal rate and regular rhythm.      Heart sounds: No murmur heard.  Pulmonary:      Effort: Pulmonary effort is normal. No respiratory distress.   Abdominal:      General: There is distension.      Tenderness: There is no abdominal tenderness.    Musculoskeletal:      Right lower leg: Edema present.      Left lower leg: Edema present.      Comments: Bilateral lower extremity edema with chronic venous stasis changes   Skin:     General: Skin is warm.      Coloration: Skin is not jaundiced.   Neurological:      Mental Status: He is alert. Mental status is at baseline.          Additional Data:     Labs:  Results from last 7 days   Lab Units 02/22/24  0536 02/21/24  1018 02/20/24  0902   WBC Thousand/uL 3.03*   < > 2.73*   HEMOGLOBIN g/dL 7.4*   < > 7.4*   HEMATOCRIT % 27.3*   < > 25.0*   PLATELETS Thousands/uL 45*   < > 40*   NEUTROS PCT %  --   --  61   LYMPHS PCT %  --   --  18   MONOS PCT %  --   --  10   EOS PCT %  --   --  10*    < > = values in this interval not displayed.     Results from last 7 days   Lab Units 02/22/24  0536   SODIUM mmol/L 136   POTASSIUM mmol/L 4.4   CHLORIDE mmol/L 105   CO2 mmol/L 23   BUN mg/dL 13   CREATININE mg/dL 0.85   ANION GAP mmol/L 8   CALCIUM mg/dL 8.1*   ALBUMIN g/dL 2.7*   TOTAL BILIRUBIN mg/dL 2.25*   ALK PHOS U/L 214*   ALT U/L 43   AST U/L 92*   GLUCOSE RANDOM mg/dL 75     Results from last 7 days   Lab Units 02/20/24  0902   INR  1.42*     Results from last 7 days   Lab Units 02/22/24  1646 02/22/24  1102 02/21/24  2122 02/21/24  1626 02/21/24  1108 02/21/24  0904 02/21/24  0743 02/20/24  2042 02/20/24  1720 02/20/24  1706 02/20/24  1637 02/20/24  1300   POC GLUCOSE mg/dl 183* 170* 152* 142* 96 119 64* 186* 91 54* 55* 72               Lines/Drains:  Invasive Devices       Peripheral Intravenous Line  Duration             Peripheral IV 02/22/24 Dorsal (posterior);Right Hand <1 day                          Imaging: Reviewed radiology reports from this admission including: abdominal/pelvic CT/MR right foot MRI abdomen    Recent Cultures (last 7 days):   Results from last 7 days   Lab Units 02/21/24  1530 02/19/24  1537 02/18/24  1908   GRAM STAIN RESULT  1+ Polys  No bacteria seen No Polys or Bacteria seen No Polys  or Bacteria seen   WOUND CULTURE   --   --  Few Colonies of   BODY FLUID CULTURE, STERILE  No growth No growth  --        Last 24 Hours Medication List:   Current Facility-Administered Medications   Medication Dose Route Frequency Provider Last Rate    acetaminophen  650 mg Oral Q6H PRN Lou Smith MD      benzonatate  200 mg Oral TID PRN Lou Smith MD      dextromethorphan-guaiFENesin  10 mL Oral Q4H PRN Alize Sanchez PA-C      fluticasone  1 spray Each Nare Daily Yajaira Boone MD      furosemide  40 mg Intravenous BID (diuretic) Yajaira Boone MD      insulin lispro  1-5 Units Subcutaneous TID AC Lou Smith MD      insulin lispro  1-5 Units Subcutaneous HS Lou Smith MD      iron sucrose  300 mg Intravenous Once per day on Monday Wednesday Friday Winston Mcneill DO      multivitamin stress formula  1 tablet Oral Daily Lou Smith MD      ondansetron  4 mg Intravenous Q6H PRN Lou Smith MD      spironolactone  50 mg Oral Daily Cait Heller DO      tamsulosin  0.4 mg Oral Daily With Dinner Lou Smith MD      traZODone  50 mg Oral HS Lou Smith MD          Today, Patient Was Seen By: Yajaira Boone MD    **Please Note: This note may have been constructed using a voice recognition system.**

## 2024-02-22 NOTE — ASSESSMENT & PLAN NOTE
Noted, echo revealed LVE 65%, Grade 2 DD  Given diffuse edema will consult cardiology to rule out cardiac cause for edema

## 2024-02-22 NOTE — ASSESSMENT & PLAN NOTE
Lab Results   Component Value Date    HGBA1C 7.3 (H) 08/18/2023       Recent Labs     02/21/24  1626 02/21/24  2122 02/22/24  1102 02/22/24  1646   POCGLU 142* 152* 170* 183*         Blood Sugar Average: Last 72 hrs:  (P) 110.95  Lantus was decreased from 40 units to 30 units the night of 2/19 and patient was hypoglycemic on 2/20 requiring dextrose pushes and a few hours of D5NS as he was NPO for EGD. Additionally, Amaryl was discontinued and Lantus was further decreased from 30 to 15 units last night. Of note, patient did not receive Lantus last night and was hypoglycemic again at 64 requiring dextorse. Will hold scheduled Lantus for now  Continue SSI and glucose checks  Hypoglycemia protocol

## 2024-02-22 NOTE — ASSESSMENT & PLAN NOTE
GI following, s/p EGD 2/20 as above  S/p IR paracentesis with plan for repeat  Continue with spironolactone.  IV Lasix given significant edema

## 2024-02-22 NOTE — CONSULTS
Consultation - Surgical Oncology   Isael Avendano 68 y.o. male MRN: [id number]  Unit/Bed#: NW8 861-02 Encounter: [id number]    Assessment/Plan     Assessment:  67yo M with left chest wall mass (2.4 x 0.6 cm) incidentally seen on MRI concerning for reactive lymph node vs schwannoma    AVSS on RA    2/19 MRI:  2.4 x 0.6 cm T1 hyperintense enhancing lesion along the left chest wall, new since MRI abdomen 10/23/2023 and likely present on CT 12/19/2023, likely representing reactive lymph node and less likely a nerve sheath tumor such as schwannoma    Plan:  - recommend IR biopsy of mass  - additional recs to follow pending path results  - remainder of care per primary service    History of Present Illness   HPI:  Isael Avendano is a 68 y.o. male who was admitted on 2/18 for shortness of breath and bilateral lower extremity edema. He was found to have a hgb of 4.8 and was transfused with pRBC. On 2/19, he had an MRI for surveillance of his liver lesions. On that MRI, a left chest wall mass was incidentally found. It is a T1 hyperintense enhancing lesion concerning for a reactive lymph node versus schwannoma. Of note, he has cirrhosis that is associated with esophageal varices requiring banding, hepatosplenamegaly, portal hypertension, and ascites. He had two IR paracentesis during this admission. Previous smoker quit in 1990s, denies current alcohol use. No known history of cancer.    PMHx: CKD, T2DM, cirrhosis, esophageal varices, splenomegaly, ascites, MEG on CPAP    Inpatient Consult to Surgical Oncology  Consult performed by: Mahsa De Leon MD  Consult ordered by: Harshal Russell MD          Review of Systems   Constitutional: Negative.    HENT: Negative.     Respiratory: Negative.     Cardiovascular: Negative.    Gastrointestinal: Negative.    Genitourinary: Negative.    Musculoskeletal: Negative.    Neurological: Negative.    Psychiatric/Behavioral: Negative.         Historical Information   Past Medical History:    Diagnosis Date    Arrhythmia     Chronic kidney disease     COVID 12/2020    CPAP (continuous positive airway pressure) dependence     Diabetes mellitus (HCC)     History of echocardiogram 11/14/2017    showed EF of 50-55 percentWith moderate LVH and left ventricle diastolic dysfunction. Left atrium was moderately enlarged. Trace MR noted.     History of Holter monitoring 11/21/2017    showed baseline rhythm of sinus origin with an average heart it of 61 bpm. The lowest heart rate was 49 and the highest heart rate was 10 8 bpm. There were rare single VPCs, and frequent PACs representing 3.2% of total beats. There were several episodes of sinus arrhythmias with sinus bradycardia and heart rate ranging from 40-90 bpm. No sustained dysrhythmias, or pauses noted. The patient did not    History of transfusion 04/2023    platelets    Liver disease     cirhosis    Murmur, cardiac     Obese     Sleep apnea      Past Surgical History:   Procedure Laterality Date    CATARACT EXTRACTION      EYE SURGERY Left 1997    FL GUIDED NEEDLE PLAC BX/ASP/INJ  8/28/2023    HERNIA REPAIR  [id number]    IR PARACENTESIS  2/19/2024    JOINT REPLACEMENT Right     TKR    KNEE ARTHROPLASTY Right 2008    DC ARTHROPLASTY GLENOHUMERAL JOINT TOTAL SHOULDER Left 04/04/2023    Procedure: ARTHROPLASTY SHOULDER REVERSE;  Surgeon: Marcelo Lester MD;  Location: [facility];  Service: Orthopedics    DC JARED SHOULDER ARTHRPLSTY HUMERAL&GLENOID COMPNT Left 9/8/2023    Procedure: removal of antibiotic spacer, incision and debridement,  with revision reverse shoulder arthroplasty;  Surgeon: Lita Aguilar;  Location:  MAIN OR;  Service: Orthopedics    DC JARED SHOULDER ARTHRPLSTY HUMERAL/GLENOID COMPNT Left 06/13/2023    Procedure: ARTHROPLASTY SHOULDER REVISION;  Surgeon: Lita Aguilar;  Location: [facility];  Service: Orthopedics    SHOULDER SURGERY Right 2002    WOUND DEBRIDEMENT Left 05/02/2023    Procedure: INCISION AND DRAINAGE (I&D)  "EXTREMITY, vac placement;  Surgeon: Marcelo Lester MD;  Location: [facility];  Service: Orthopedics     Social History   Social History     Substance and Sexual Activity   Alcohol Use Not Currently    Comment: quit      Social History     Substance and Sexual Activity   Drug Use Never     E-Cigarette/Vaping    E-Cigarette Use Never User      E-Cigarette/Vaping Substances    Nicotine No     THC No     CBD No     Flavoring No     Other No     Unknown No      Social History     Tobacco Use   Smoking Status Former    Current packs/day: 0.00    Average packs/day: 0.5 packs/day for 20.0 years (10.0 ttl pk-yrs)    Types: Cigarettes    Start date:     Quit date:     Years since quittin.1   Smokeless Tobacco Never     Family History: non-contributory    Meds/Allergies   all current active meds have been reviewed  Allergies   Allergen Reactions    Sulfa Antibiotics Hives    Daptomycin Other (See Comments)     Possibly contributed to ABILIO on 2023 admission        Objective   First Vitals:   Blood Pressure: (!) 142/45 (24 1219)  Pulse: 98 (24)  Temperature: 98.1 °F (36.7 °C) (24)  Temp Source: Tympanic (24 121)  Respirations: 20 (24)  Height: 5' 11\" (180.3 cm) (24)  Weight - Scale: 110 kg (242 lb 8.1 oz) (24)  SpO2: 100 % (24)    Current Vitals:   Blood Pressure: 139/63 (24)  Pulse: 102 (24)  Temperature: 97.5 °F (36.4 °C) (24)  Temp Source: Oral (24)  Respirations: 16 (24)  Height: 5' 11\" (180.3 cm) (24 1306)  Weight - Scale: 125 kg (275 lb 9.2 oz) (24 0555)  SpO2: 97 % (24)    No intake or output data in the 24 hours ending 24 0807    Invasive Devices       Peripheral Intravenous Line  Duration             Peripheral IV 02/22/24 Dorsal (posterior);Right Hand <1 day                    Physical Exam:  General: No acute distress  Neuro: " alert and oriented  HEENT: moist mucous membranes  Chest: no mass palpated on exam  CV: Well perfused, regular rate and rhythm  Lungs: Normal work of breathing, no increased respiratory effort  Abdomen: Soft, non-tender, non-distended.   Extremities: No edema, clubbing or cyanosis  Skin: Warm, dry    Lab Results: CBC:   Lab Results   Component Value Date    WBC 3.03 (L) 02/22/2024    HGB 7.4 (L) 02/22/2024    HCT 27.3 (L) 02/22/2024    MCV 86 02/22/2024    PLT 45 (L) 02/22/2024    RBC 3.19 (L) 02/22/2024    MCH 23.2 (L) 02/22/2024    MCHC 27.1 (L) 02/22/2024    RDW 22.6 (H) 02/22/2024   , CMP:   Lab Results   Component Value Date    SODIUM 136 02/22/2024    K 4.4 02/22/2024     02/22/2024    CO2 23 02/22/2024    BUN 13 02/22/2024    CREATININE 0.85 02/22/2024    CALCIUM 8.1 (L) 02/22/2024    AST 92 (H) 02/22/2024    ALT 43 02/22/2024    ALKPHOS 214 (H) 02/22/2024    EGFR 88 02/22/2024     Imaging: I have personally reviewed pertinent films in PACS  EKG, Pathology, and Other Studies: I have personally reviewed pertinent films in PACS    Counseling / Coordination of Care  Total floor / unit time spent today 20 minutes.  Greater than 50% of total time was spent with the patient and / or family counseling and / or coordination of care.       Chiquita De Leon MD  General Surgery PGY2

## 2024-02-23 PROBLEM — R05.9 COUGH: Status: ACTIVE | Noted: 2024-02-23

## 2024-02-23 PROBLEM — R22.2 CHEST WALL MASS: Status: ACTIVE | Noted: 2024-02-23

## 2024-02-23 PROBLEM — E87.70 VOLUME OVERLOAD: Status: ACTIVE | Noted: 2024-02-23

## 2024-02-23 LAB
ALBUMIN SERPL BCP-MCNC: 2.6 G/DL (ref 3.5–5)
ALP SERPL-CCNC: 207 U/L (ref 34–104)
ALT SERPL W P-5'-P-CCNC: 41 U/L (ref 7–52)
ANION GAP SERPL CALCULATED.3IONS-SCNC: 7 MMOL/L
AST SERPL W P-5'-P-CCNC: 69 U/L (ref 13–39)
BILIRUB SERPL-MCNC: 2.25 MG/DL (ref 0.2–1)
BUN SERPL-MCNC: 12 MG/DL (ref 5–25)
CALCIUM ALBUM COR SERPL-MCNC: 9.3 MG/DL (ref 8.3–10.1)
CALCIUM SERPL-MCNC: 8.2 MG/DL (ref 8.4–10.2)
CHLORIDE SERPL-SCNC: 106 MMOL/L (ref 96–108)
CO2 SERPL-SCNC: 24 MMOL/L (ref 21–32)
CREAT SERPL-MCNC: 0.88 MG/DL (ref 0.6–1.3)
ERYTHROCYTE [DISTWIDTH] IN BLOOD BY AUTOMATED COUNT: 23 % (ref 11.6–15.1)
GFR SERPL CREATININE-BSD FRML MDRD: 87 ML/MIN/1.73SQ M
GLUCOSE SERPL-MCNC: 102 MG/DL (ref 65–140)
GLUCOSE SERPL-MCNC: 113 MG/DL (ref 65–140)
GLUCOSE SERPL-MCNC: 166 MG/DL (ref 65–140)
GLUCOSE SERPL-MCNC: 194 MG/DL (ref 65–140)
GLUCOSE SERPL-MCNC: 208 MG/DL (ref 65–140)
HCT VFR BLD AUTO: 27.3 % (ref 36.5–49.3)
HGB BLD-MCNC: 7.5 G/DL (ref 12–17)
MCH RBC QN AUTO: 23.2 PG (ref 26.8–34.3)
MCHC RBC AUTO-ENTMCNC: 27.5 G/DL (ref 31.4–37.4)
MCV RBC AUTO: 85 FL (ref 82–98)
PLATELET # BLD AUTO: 43 THOUSANDS/UL (ref 149–390)
POTASSIUM SERPL-SCNC: 4 MMOL/L (ref 3.5–5.3)
PROT SERPL-MCNC: 5.4 G/DL (ref 6.4–8.4)
RBC # BLD AUTO: 3.23 MILLION/UL (ref 3.88–5.62)
SODIUM SERPL-SCNC: 137 MMOL/L (ref 135–147)
WBC # BLD AUTO: 3.41 THOUSAND/UL (ref 4.31–10.16)

## 2024-02-23 PROCEDURE — 80053 COMPREHEN METABOLIC PANEL: CPT | Performed by: FAMILY MEDICINE

## 2024-02-23 PROCEDURE — 82948 REAGENT STRIP/BLOOD GLUCOSE: CPT

## 2024-02-23 PROCEDURE — 99232 SBSQ HOSP IP/OBS MODERATE 35: CPT | Performed by: INTERNAL MEDICINE

## 2024-02-23 PROCEDURE — 85027 COMPLETE CBC AUTOMATED: CPT | Performed by: FAMILY MEDICINE

## 2024-02-23 PROCEDURE — 99232 SBSQ HOSP IP/OBS MODERATE 35: CPT | Performed by: FAMILY MEDICINE

## 2024-02-23 RX ORDER — BUMETANIDE 0.25 MG/ML
2 INJECTION INTRAMUSCULAR; INTRAVENOUS 2 TIMES DAILY
Status: DISCONTINUED | OUTPATIENT
Start: 2024-02-23 | End: 2024-02-26

## 2024-02-23 RX ADMIN — FUROSEMIDE 40 MG: 10 INJECTION, SOLUTION INTRAMUSCULAR; INTRAVENOUS at 09:45

## 2024-02-23 RX ADMIN — BENZONATATE 200 MG: 100 CAPSULE ORAL at 09:44

## 2024-02-23 RX ADMIN — SPIRONOLACTONE 50 MG: 50 TABLET, FILM COATED ORAL at 09:44

## 2024-02-23 RX ADMIN — TRAZODONE HYDROCHLORIDE 50 MG: 50 TABLET ORAL at 23:33

## 2024-02-23 RX ADMIN — GUAIFENESIN AND DEXTROMETHORPHAN 10 ML: 100; 10 SYRUP ORAL at 09:44

## 2024-02-23 RX ADMIN — INSULIN LISPRO 1 UNITS: 100 INJECTION, SOLUTION INTRAVENOUS; SUBCUTANEOUS at 12:16

## 2024-02-23 RX ADMIN — INSULIN LISPRO 1 UNITS: 100 INJECTION, SOLUTION INTRAVENOUS; SUBCUTANEOUS at 22:15

## 2024-02-23 RX ADMIN — INSULIN LISPRO 1 UNITS: 100 INJECTION, SOLUTION INTRAVENOUS; SUBCUTANEOUS at 18:26

## 2024-02-23 RX ADMIN — BUMETANIDE 2 MG: 0.25 INJECTION INTRAMUSCULAR; INTRAVENOUS at 18:26

## 2024-02-23 RX ADMIN — IRON SUCROSE 300 MG: 20 INJECTION, SOLUTION INTRAVENOUS at 09:44

## 2024-02-23 RX ADMIN — B-COMPLEX W/ C & FOLIC ACID TAB 1 TABLET: TAB at 09:44

## 2024-02-23 RX ADMIN — TAMSULOSIN HYDROCHLORIDE 0.4 MG: 0.4 CAPSULE ORAL at 18:27

## 2024-02-23 RX ADMIN — FLUTICASONE PROPIONATE 1 SPRAY: 50 SPRAY, METERED NASAL at 12:03

## 2024-02-23 RX ADMIN — ACETAMINOPHEN 650 MG: 325 TABLET, FILM COATED ORAL at 09:44

## 2024-02-23 NOTE — PROGRESS NOTES
"Cardiology Progress Note - Isael Avendano 68 y.o. male MRN: [id number]    Unit/Bed#: NW8 861-02 Encounter: [id number]      Assessment:  Volume overload - predominantly due to ascites with 3rd spacing from low albumin.  Likely not a significant component of diastolic heart failure. Will switch to IV bumex, as given low albumin, may be more effective as a diuretic as not protein bound.   Anemia - improvement s/p PRBC transfusion.  Cirrhosis - etiology of volume overload.  Osteomyelitis - at acceptable cardiovascular risk for toe amputation.     Plan:  Continue current medications.  No further cardiac w/u necessary at this time.    Patient at acceptable cardiovascular risk to proceed with surgery.  Switch to Bumex IV.      Subjective:    No significant events overnight.  Still with edema.     Review of Systems   Cardiovascular:  Positive for leg swelling. Negative for chest pain and palpitations.   Respiratory:  Negative for shortness of breath.        Objective:   Vitals: Blood pressure 130/58, pulse 95, temperature 98 °F (36.7 °C), temperature source Oral, resp. rate 15, height 5' 11\" (1.803 m), weight 125 kg (276 lb 3.8 oz), SpO2 96%., Body mass index is 39.64 kg/m².,   Orthostatic Blood Pressures      Flowsheet Row Most Recent Value   Blood Pressure 130/58 filed at 2024 0750   Patient Position - Orthostatic VS Lying filed at 2024 2300           Systolic (24hrs), Av , Min:100 , Max:131     Diastolic (24hrs), Av, Min:51, Max:62      Intake/Output Summary (Last 24 hours) at 2024 0950  Last data filed at 2024 0845  Gross per 24 hour   Intake 230 ml   Output --   Net 230 ml     Weight (last 2 days)       Date/Time Weight    24 0600 125 (276.24)    24 0555 125 (275.58)    24 0532 110 (242)                Telemetry Review: Off    Physical Exam  Cardiovascular:      Rate and Rhythm: Normal rate and regular rhythm.      Heart sounds: Normal heart sounds. No murmur heard.     No " friction rub. No gallop.   Pulmonary:      Breath sounds: Normal breath sounds. No wheezing or rales.   Musculoskeletal:      Right lower le+ Pitting Edema present.      Left lower le+ Pitting Edema present.           Laboratory Results:        CBC with diff:   Results from last 7 days   Lab Units 24  0557 24  0536 24  1018 24  0902 24  1736 24  0527 24  2248 24  1523 24  1402   WBC Thousand/uL 3.41* 3.03* 2.49* 2.73*  --  3.32*  --  3.24* 3.60*   HEMOGLOBIN g/dL 7.5* 7.4* 7.3* 7.4*  --  7.6* 5.7* 4.8* 4.9*   HEMATOCRIT % 27.3* 27.3* 24.1* 25.0*  --  25.7* 19.3* 17.6* 17.6*   MCV fL 85 86 77* 79*  --  79*  --  74* 74*   PLATELETS Thousands/uL 43* 45* 38* 40* 41* 45*  --  45* 47*   RBC Million/uL 3.23* 3.19* 3.15* 3.17*  --  3.25*  --  2.37* 2.39*   MCH pg 23.2* 23.2* 23.2* 23.3*  --  23.4*  --  20.3* 20.5*   MCHC g/dL 27.5* 27.1* 30.3* 29.6*  --  29.6*  --  27.3* 27.8*   RDW % 23.0* 22.6* 21.2* 19.9*  --  19.9*  --  19.0* 18.9*   NRBC AUTO /100 WBCs  --   --   --  0  --   --   --  1 1         CMP:  Results from last 7 days   Lab Units 24  0557 24  0536 24  0502 24  0902 24  0527 24  1402   POTASSIUM mmol/L 4.0 4.4 3.9 3.6 3.2* 3.7   CHLORIDE mmol/L 106 105 106 108 106 106   CO2 mmol/L 24 23 25 30 28 28   BUN mg/dL 12 13 11 15 19 25   CREATININE mg/dL 0.88 0.85 0.80 0.90 0.99 1.01   CALCIUM mg/dL 8.2* 8.1* 8.2* 8.3* 8.3* 8.8   AST U/L 69* 92* 68* 56*  --  50*   ALT U/L 41 43 41 43  --  42   ALK PHOS U/L 207* 214* 219* 233*  --  219*   EGFR ml/min/1.73sq m 87 88 90 86 76 75         BMP:  Results from last 7 days   Lab Units 24  0557 24  0536 24  0502 24  0902 24  0527 24  1402   POTASSIUM mmol/L 4.0 4.4 3.9 3.6 3.2* 3.7   CHLORIDE mmol/L 106 105 106 108 106 106   CO2 mmol/L 24 23 25 30 28 28   BUN mg/dL 12 13 11 15 19 25   CREATININE mg/dL 0.88 0.85 0.80 0.90 0.99 1.01   CALCIUM mg/dL  8.2* 8.1* 8.2* 8.3* 8.3* 8.8       BNP:     Magnesium:       Coags:   Results from last 7 days   Lab Units 02/20/24  0902 02/19/24  1736 02/18/24  1402   PTT seconds  --  48* 50*   INR  1.42* 1.46* 1.49*       TSH:       Lipid Profile:             Cardiac testing:   No results found for this or any previous visit.    No results found for this or any previous visit.    No results found for this or any previous visit.    No results found for this or any previous visit.      Meds/Allergies   Current Facility-Administered Medications   Medication Dose Route Frequency Provider Last Rate    acetaminophen  650 mg Oral Q6H PRN Lou Smith MD      benzonatate  200 mg Oral TID PRN Lou Smith MD      dextromethorphan-guaiFENesin  10 mL Oral Q4H PRN Alize Sanchez PA-C      fluticasone  1 spray Each Nare Daily Yajaira Boone MD      furosemide  40 mg Intravenous BID (diuretic) Yajaira Boone MD      insulin lispro  1-5 Units Subcutaneous TID AC Lou Smith MD      insulin lispro  1-5 Units Subcutaneous HS Lou Smith MD      iron sucrose  300 mg Intravenous Once per day on Monday Wednesday Friday Winston Mcneill DO      levofloxacin  750 mg Intravenous Q24H Yajaira Boone  mg (02/22/24 2031)    multivitamin stress formula  1 tablet Oral Daily Lou Smith MD      ondansetron  4 mg Intravenous Q6H PRN Lou Smith MD      spironolactone  50 mg Oral Daily Cait Heller DO      tamsulosin  0.4 mg Oral Daily With Dinner Lou Smith MD      traZODone  50 mg Oral HS Lou Smith MD          Medications Prior to Admission   Medication    levofloxacin (LEVAQUIN) 750 mg tablet    benzonatate (TESSALON) 200 MG capsule    furosemide (LASIX) 20 mg tablet    glimepiride (AMARYL) 4 mg tablet    Insulin Pen Needle (BD Pen Needle Nayla 2nd Gen) 32G X 4 MM MISC    Lantus SoloStar 100 units/mL SOPN    Multiple Vitamin (multivitamin) capsule    polyethylene glycol (GOLYTELY) 4000 mL solution     tamsulosin (FLOMAX) 0.4 mg    traZODone (DESYREL) 50 mg tablet       Assessment:  Principal Problem:    Symptomatic anemia  Active Problems:    DM2 (diabetes mellitus, type 2) (HCC)    Essential hypertension    Cirrhosis, alcoholic (HCC)    MEG (obstructive sleep apnea)    Pancytopenia (HCC)    Thrombocytopenia (HCC)    Diabetic ulcer of left foot associated with type 2 diabetes mellitus (HCC)    Stage 3a chronic kidney disease (HCC)    Murmur    Urinary retention

## 2024-02-23 NOTE — PROGRESS NOTES
[facility]  Progress Note  Name: Isael Avendano I  MRN: [id number]  Unit/Bed#: NW8 861-02 I Date of Admission: 2/18/2024   Date of Service: 2/23/2024 I Hospital Day: 5    Assessment/Plan   * Symptomatic anemia  Assessment & Plan  GI and Heme/Onc following. 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  History suggestive of postnasal drip.    Previous imaging study is reviewed.  Will order a CT chest once the patient becomes more euvolemic    Chest wall mass  Assessment & Plan  Surgical oncology consultation appreciated.  Recommended IR biopsy.    IR consult for biopsy for next week once the patient has undergone diuresis    Volume overload  Assessment & Plan  Patient with significant edema bilateral lower extremities and ascites.  Cardiology progress note and consultation appreciated.  Likely secondary to cirrhosis and low albumin rather than diastolic congestive heart failure  Currently on Bumex.  Monitor electrolytes and creatinine while on diuresis    Urinary retention  Assessment & Plan  Continue flomax    Murmur  Assessment & Plan  Noted, echo revealed LVE 65%, Grade 2 DD  Given diffuse edema will consult cardiology to rule out cardiac cause for edema  Cardiology limitation appreciated.-Outpatient follow-up with cardiology       Stage 3a chronic kidney disease (HCC)  Assessment & Plan  Lab Results   Component Value Date    EGFR 87 02/23/2024    EGFR 88 02/22/2024    EGFR 90  02/21/2024    CREATININE 0.88 02/23/2024    CREATININE 0.85 02/22/2024    CREATININE 0.80 02/21/2024   Monitor renal function  Diuretic recs per cardiology.  Monitor creatinine with diuresis    Diabetic ulcer of left foot associated with type 2 diabetes mellitus (HCC)  Assessment & Plan  Lab Results   Component Value Date    HGBA1C 7.3 (H) 08/18/2023       Recent Labs     02/22/24 2039 02/23/24  0733 02/23/24  1139 02/23/24  1721   POCGLU 196* 113 208* 166*       Patient has been on Levquin started in [place], was told he had contracted a bacteria from the water?   Unable to see records in [facility] from [facility]    Phone number [phone] Dr. Fong.   Podiatry following   MRI concerning for OM--no acute infection per podiatry.  Will DC antibiotics.  Discussed with podiatry does not feel like the patient needs to be on antibiotics at this point.  Surgical plan per podiatry.  Cardiology evaluation appreciated.    Patient is definitely high risk for any surgical intervention but risk are non modifiable-as per podiatry progress note they are not planning for any surgical intervention.  Will DC antibiotics and monitor    Thrombocytopenia (HCC)  Assessment & Plan  Platelets are 43. 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  Removed a total of 3.8 L of fluid.  Continue with diuresis    Essential hypertension  Assessment & Plan  BP stable,   Continue with diuresis    DM2 (diabetes mellitus, type 2) (HCC)  Assessment & Plan  Lab Results   Component Value Date    HGBA1C 7.3 (H) 08/18/2023       Recent Labs     02/22/24 2039 02/23/24  0733  02/23/24  1139 02/23/24  1721   POCGLU 196* 113 208* 166*         Blood Sugar Average: Last 72 hrs:  (P) 117  Lantus was decreased from 40 units to 30 units the night of 2/19 and patient was hypoglycemic on 2/20 requiring dextrose pushes and a few hours of D5NS as he was NPO for EGD. Additionally, Amaryl was discontinued and Lantus was further decreased from 30 to 15 units last night. Of note, patient did not receive Lantus last night and was hypoglycemic again at 64 requiring dextorse. Will hold scheduled Lantus for now  Continue SSI and glucose checks  Hypoglycemia protocol                  VTE Pharmacologic Prophylaxis: VTE Score: 4 contraindicated due to thrombocytopenia  Mobility:   Basic Mobility Inpatient Raw Score: 23  JH-HLM Goal: 7: Walk 25 feet or more  JH-HLM Achieved: 7: Walk 25 feet or more  HLM Goal achieved. Continue to encourage appropriate mobility.    Patient Centered Rounds: I performed bedside rounds with nursing staff today.   Discussions with Specialists or Other Care Team Provider:     Education and Discussions with Family / Patient: Updated  (wife) at bedside.    Total Time Spent on Date of Encounter in care of patient: 35 mins. This time was spent on one or more of the following: performing physical exam; counseling and coordination of care; obtaining or reviewing history; documenting in the medical record; reviewing/ordering tests, medications or procedures; communicating with other healthcare professionals and discussing with patient's family/caregivers.    Current Length of Stay: 5 day(s)  Current Patient Status: Inpatient   Certification Statement: The patient will continue to require additional inpatient hospital stay due to IV diuresis  Discharge Plan: Anticipate discharge in >72 hrs to home with home services.    Code Status: Level 1 - Full Code    Subjective:   Patient seen and examined.  Patient reported that after the IV Lasix he had good urine output.  Discussed  with nursing about obtaining daily weight and accurate intake and output    Objective:     Vitals:   Temp (24hrs), Av.5 °F (36.9 °C), Min:98 °F (36.7 °C), Max:99.1 °F (37.3 °C)    Temp:  [98 °F (36.7 °C)-99.1 °F (37.3 °C)] 98.4 °F (36.9 °C)  HR:  [89-95] 89  Resp:  [15-16] 16  BP: (100-130)/(51-60) 127/60  SpO2:  [96 %-99 %] 99 %  Body mass index is 39.13 kg/m².     Input and Output Summary (last 24 hours):     Intake/Output Summary (Last 24 hours) at 2024 1842  Last data filed at 2024 1500  Gross per 24 hour   Intake 1070 ml   Output 1675 ml   Net -605 ml       Physical Exam:   Physical Exam  Constitutional:       General: He is not in acute distress.     Appearance: He is not ill-appearing.   HENT:      Head: Normocephalic.      Nose: Nose normal.   Eyes:      General: No scleral icterus.  Cardiovascular:      Rate and Rhythm: Normal rate and regular rhythm.      Heart sounds: Murmur heard.   Pulmonary:      Comments: Decreased breath sounds bilateral but clear to auscultation  Abdominal:      General: There is distension.   Musculoskeletal:      Right lower leg: Edema present.      Left lower leg: Edema present.      Comments: Left lower extremity wound covered in dressing   Skin:     General: Skin is warm.   Neurological:      Mental Status: He is alert. Mental status is at baseline.          Additional Data:     Labs:  Results from last 7 days   Lab Units 24  0557 24  1018 24  0902   WBC Thousand/uL 3.41*   < > 2.73*   HEMOGLOBIN g/dL 7.5*   < > 7.4*   HEMATOCRIT % 27.3*   < > 25.0*   PLATELETS Thousands/uL 43*   < > 40*   NEUTROS PCT %  --   --  61   LYMPHS PCT %  --   --  18   MONOS PCT %  --   --  10   EOS PCT %  --   --  10*    < > = values in this interval not displayed.     Results from last 7 days   Lab Units 24  0557   SODIUM mmol/L 137   POTASSIUM mmol/L 4.0   CHLORIDE mmol/L 106   CO2 mmol/L 24   BUN mg/dL 12   CREATININE mg/dL 0.88   ANION GAP mmol/L 7   CALCIUM  mg/dL 8.2*   ALBUMIN g/dL 2.6*   TOTAL BILIRUBIN mg/dL 2.25*   ALK PHOS U/L 207*   ALT U/L 41   AST U/L 69*   GLUCOSE RANDOM mg/dL 102     Results from last 7 days   Lab Units 02/20/24  0902   INR  1.42*     Results from last 7 days   Lab Units 02/23/24  1721 02/23/24  1139 02/23/24  0733 02/22/24  2039 02/22/24  1646 02/22/24  1102 02/21/24  2122 02/21/24  1626 02/21/24  1108 02/21/24  0904 02/21/24  0743 02/20/24  2042   POC GLUCOSE mg/dl 166* 208* 113 196* 183* 170* 152* 142* 96 119 64* 186*               Lines/Drains:  Invasive Devices       Peripheral Intravenous Line  Duration             Peripheral IV 02/22/24 Dorsal (posterior);Right Hand 1 day                          Imaging: No pertinent imaging reviewed.    Recent Cultures (last 7 days):   Results from last 7 days   Lab Units 02/21/24  1530 02/19/24  1537 02/18/24  1908   GRAM STAIN RESULT  1+ Polys  No bacteria seen No Polys or Bacteria seen No Polys or Bacteria seen   WOUND CULTURE   --   --  Few Colonies of   BODY FLUID CULTURE, STERILE  No growth No growth  --        Last 24 Hours Medication List:   Current Facility-Administered Medications   Medication Dose Route Frequency Provider Last Rate    acetaminophen  650 mg Oral Q6H PRN Lou Smith MD      benzonatate  200 mg Oral TID PRN Lou Smith MD      bumetanide  2 mg Intravenous BID Jann Martinez MD      dextromethorphan-guaiFENesin  10 mL Oral Q4H PRN Alize Sanchez PA-C      fluticasone  1 spray Each Nare Daily Yajaira Boone MD      insulin lispro  1-5 Units Subcutaneous TID AC Lou Smith MD      insulin lispro  1-5 Units Subcutaneous HS Lou Smith MD      iron sucrose  300 mg Intravenous Once per day on Monday Wednesday Friday Winston Mcneill DO      multivitamin stress formula  1 tablet Oral Daily Lou Smith MD      ondansetron  4 mg Intravenous Q6H PRN Lou Smith MD      spironolactone  50 mg Oral Daily Cait Shupp, DO      tamsulosin  0.4 mg Oral  Daily With Dinner Lou Smith MD      traZODone  50 mg Oral HS Lou Smith MD          Today, Patient Was Seen By: Yajaira Boone MD    **Please Note: This note may have been constructed using a voice recognition system.**

## 2024-02-23 NOTE — ASSESSMENT & PLAN NOTE
Noted, echo revealed LVE 65%, Grade 2 DD  Given diffuse edema will consult cardiology to rule out cardiac cause for edema  Cardiology limitation appreciated.-Outpatient follow-up with cardiology

## 2024-02-23 NOTE — ASSESSMENT & PLAN NOTE
Lab Results   Component Value Date    HGBA1C 7.3 (H) 08/18/2023       Recent Labs     02/22/24 2039 02/23/24  0733 02/23/24  1139 02/23/24  1721   POCGLU 196* 113 208* 166*       Patient has been on Levquin started in [place], was told he had contracted a bacteria from the water?   Unable to see records in [facility] from [facility]    Phone number [phone] Dr. Fong.   Podiatry following   MRI concerning for OM--no acute infection per podiatry.  Will DC antibiotics.  Discussed with podiatry does not feel like the patient needs to be on antibiotics at this point.  Surgical plan per podiatry.  Cardiology evaluation appreciated.    Patient is definitely high risk for any surgical intervention but risk are non modifiable-as per podiatry progress note they are not planning for any surgical intervention.  Will DC antibiotics and monitor

## 2024-02-23 NOTE — ASSESSMENT & PLAN NOTE
Patient with significant edema bilateral lower extremities and ascites.  Cardiology progress note and consultation appreciated.  Likely secondary to cirrhosis and low albumin rather than diastolic congestive heart failure  Currently on Bumex.  Monitor electrolytes and creatinine while on diuresis

## 2024-02-23 NOTE — ASSESSMENT & PLAN NOTE
GI following, s/p EGD 2/20 as above  Status post paracentesis x 2.  Removed a total of 3.8 L of fluid.  Continue with diuresis

## 2024-02-23 NOTE — ASSESSMENT & PLAN NOTE
Lab Results   Component Value Date    HGBA1C 7.3 (H) 08/18/2023       Recent Labs     02/22/24 2039 02/23/24  0733 02/23/24  1139 02/23/24  1721   POCGLU 196* 113 208* 166*         Blood Sugar Average: Last 72 hrs:  (P) 117  Lantus was decreased from 40 units to 30 units the night of 2/19 and patient was hypoglycemic on 2/20 requiring dextrose pushes and a few hours of D5NS as he was NPO for EGD. Additionally, Amaryl was discontinued and Lantus was further decreased from 30 to 15 units last night. Of note, patient did not receive Lantus last night and was hypoglycemic again at 64 requiring dextorse. Will hold scheduled Lantus for now  Continue SSI and glucose checks  Hypoglycemia protocol

## 2024-02-23 NOTE — PLAN OF CARE
Problem: Potential for Falls  Goal: Patient will remain free of falls  Description: INTERVENTIONS:  - Educate patient/family on patient safety including physical limitations  - Instruct patient to call for assistance with activity   - Consult OT/PT to assist with strengthening/mobility   - Keep Call bell within reach  - Keep bed low and locked with side rails adjusted as appropriate  - Keep care items and personal belongings within reach  - Initiate and maintain comfort rounds  - Make Fall Risk Sign visible to staff  - Offer Toileting every 2 Hours, in advance of need  - Initiate/Maintain bed alarm  - Obtain necessary fall risk management equipment: yes    - Apply yellow socks and bracelet for high fall risk patients  - Consider moving patient to room near nurses station  Outcome: Progressing

## 2024-02-23 NOTE — ASSESSMENT & PLAN NOTE
Surgical oncology consultation appreciated.  Recommended IR biopsy.    IR consult for biopsy for next week once the patient has undergone diuresis

## 2024-02-23 NOTE — ASSESSMENT & PLAN NOTE
Patient gives history of chronic cough has been going on for more than 6 months at this time.  History suggestive of postnasal drip.    Previous imaging study is reviewed.  Will order a CT chest once the patient becomes more euvolemic

## 2024-02-23 NOTE — ASSESSMENT & PLAN NOTE
GI and Heme/Onc following. 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

## 2024-02-23 NOTE — ASSESSMENT & PLAN NOTE
Lab Results   Component Value Date    EGFR 87 02/23/2024    EGFR 88 02/22/2024    EGFR 90 02/21/2024    CREATININE 0.88 02/23/2024    CREATININE 0.85 02/22/2024    CREATININE 0.80 02/21/2024   Monitor renal function  Diuretic recs per cardiology.  Monitor creatinine with diuresis

## 2024-02-24 LAB
ANION GAP SERPL CALCULATED.3IONS-SCNC: 7 MMOL/L
BACTERIA SPEC BFLD CULT: NO GROWTH
BUN SERPL-MCNC: 12 MG/DL (ref 5–25)
CALCIUM SERPL-MCNC: 8.2 MG/DL (ref 8.4–10.2)
CHLORIDE SERPL-SCNC: 105 MMOL/L (ref 96–108)
CO2 SERPL-SCNC: 27 MMOL/L (ref 21–32)
CREAT SERPL-MCNC: 0.91 MG/DL (ref 0.6–1.3)
ERYTHROCYTE [DISTWIDTH] IN BLOOD BY AUTOMATED COUNT: 24.2 % (ref 11.6–15.1)
GFR SERPL CREATININE-BSD FRML MDRD: 85 ML/MIN/1.73SQ M
GLUCOSE SERPL-MCNC: 112 MG/DL (ref 65–140)
GLUCOSE SERPL-MCNC: 116 MG/DL (ref 65–140)
GLUCOSE SERPL-MCNC: 225 MG/DL (ref 65–140)
GLUCOSE SERPL-MCNC: 233 MG/DL (ref 65–140)
GLUCOSE SERPL-MCNC: 242 MG/DL (ref 65–140)
GRAM STN SPEC: NORMAL
GRAM STN SPEC: NORMAL
HCT VFR BLD AUTO: 27.3 % (ref 36.5–49.3)
HGB BLD-MCNC: 7.9 G/DL (ref 12–17)
MAGNESIUM SERPL-MCNC: 1.8 MG/DL (ref 1.9–2.7)
MCH RBC QN AUTO: 23.4 PG (ref 26.8–34.3)
MCHC RBC AUTO-ENTMCNC: 28.9 G/DL (ref 31.4–37.4)
MCV RBC AUTO: 81 FL (ref 82–98)
PLATELET # BLD AUTO: 44 THOUSANDS/UL (ref 149–390)
POTASSIUM SERPL-SCNC: 3.7 MMOL/L (ref 3.5–5.3)
RBC # BLD AUTO: 3.38 MILLION/UL (ref 3.88–5.62)
SODIUM SERPL-SCNC: 139 MMOL/L (ref 135–147)
WBC # BLD AUTO: 3.55 THOUSAND/UL (ref 4.31–10.16)

## 2024-02-24 PROCEDURE — 80048 BASIC METABOLIC PNL TOTAL CA: CPT | Performed by: INTERNAL MEDICINE

## 2024-02-24 PROCEDURE — 85027 COMPLETE CBC AUTOMATED: CPT | Performed by: FAMILY MEDICINE

## 2024-02-24 PROCEDURE — 99232 SBSQ HOSP IP/OBS MODERATE 35: CPT | Performed by: FAMILY MEDICINE

## 2024-02-24 PROCEDURE — 82948 REAGENT STRIP/BLOOD GLUCOSE: CPT

## 2024-02-24 PROCEDURE — 83735 ASSAY OF MAGNESIUM: CPT | Performed by: FAMILY MEDICINE

## 2024-02-24 PROCEDURE — 99232 SBSQ HOSP IP/OBS MODERATE 35: CPT | Performed by: INTERNAL MEDICINE

## 2024-02-24 RX ORDER — MAGNESIUM SULFATE HEPTAHYDRATE 40 MG/ML
2 INJECTION, SOLUTION INTRAVENOUS ONCE
Status: COMPLETED | OUTPATIENT
Start: 2024-02-24 | End: 2024-02-24

## 2024-02-24 RX ADMIN — INSULIN LISPRO 2 UNITS: 100 INJECTION, SOLUTION INTRAVENOUS; SUBCUTANEOUS at 17:13

## 2024-02-24 RX ADMIN — INSULIN LISPRO 2 UNITS: 100 INJECTION, SOLUTION INTRAVENOUS; SUBCUTANEOUS at 12:38

## 2024-02-24 RX ADMIN — INSULIN LISPRO 2 UNITS: 100 INJECTION, SOLUTION INTRAVENOUS; SUBCUTANEOUS at 21:04

## 2024-02-24 RX ADMIN — TAMSULOSIN HYDROCHLORIDE 0.4 MG: 0.4 CAPSULE ORAL at 15:30

## 2024-02-24 RX ADMIN — GUAIFENESIN AND DEXTROMETHORPHAN 10 ML: 100; 10 SYRUP ORAL at 10:55

## 2024-02-24 RX ADMIN — FLUTICASONE PROPIONATE 1 SPRAY: 50 SPRAY, METERED NASAL at 10:57

## 2024-02-24 RX ADMIN — SPIRONOLACTONE 50 MG: 50 TABLET, FILM COATED ORAL at 10:56

## 2024-02-24 RX ADMIN — B-COMPLEX W/ C & FOLIC ACID TAB 1 TABLET: TAB at 10:55

## 2024-02-24 RX ADMIN — BENZONATATE 200 MG: 100 CAPSULE ORAL at 10:55

## 2024-02-24 RX ADMIN — BUMETANIDE 2 MG: 0.25 INJECTION INTRAMUSCULAR; INTRAVENOUS at 14:19

## 2024-02-24 RX ADMIN — MAGNESIUM SULFATE HEPTAHYDRATE 2 G: 40 INJECTION, SOLUTION INTRAVENOUS at 14:30

## 2024-02-24 NOTE — PLAN OF CARE
Problem: SKIN/TISSUE INTEGRITY - ADULT  Goal: Incision(s), wounds(s) or drain site(s) healing without S/S of infection  Description: INTERVENTIONS  - Assess and document dressing, incision, wound bed, drain sites and surrounding tissue  - Provide patient and family education  - Perform skin care/dressing changes every day (by the Podiatrist)  Outcome: Progressing

## 2024-02-24 NOTE — PROGRESS NOTES
"Cardiology Progress Note - Isael Avendano 68 y.o. male MRN: [id number]    Unit/Bed#: NW8 861-02 Encounter: [id number]      Assessment:  Volume overload - predominantly due to ascites with 3rd spacing from low albumin.  Likely not a significant component of diastolic heart failure. Improved diuresis  on IV Bumex.  Anemia - improvement s/p PRBC transfusion.  Cirrhosis - etiology of volume overload.  Osteomyelitis - at acceptable cardiovascular risk for toe amputation.     Plan:  Continue current medications.  No further cardiac w/u necessary at this time.    Patient at acceptable cardiovascular risk to proceed with surgery.  Continue IV Bumex.     Subjective:    No significant events overnight.  Still with edema, but improved abdominal swelling.     Review of Systems   Cardiovascular:  Positive for leg swelling. Negative for chest pain and palpitations.   Respiratory:  Negative for shortness of breath.        Objective:   Vitals: Blood pressure 124/62, pulse 98, temperature 98.1 °F (36.7 °C), temperature source Oral, resp. rate 17, height 5' 11\" (1.803 m), weight 124 kg (272 lb 11.3 oz), SpO2 96%., Body mass index is 39.13 kg/m².,   Orthostatic Blood Pressures      Flowsheet Row Most Recent Value   Blood Pressure 124/62 filed at 2024 0852   Patient Position - Orthostatic VS Sitting filed at 2024 0852           Systolic (24hrs), Av , Min:101 , Max:127     Diastolic (24hrs), Av, Min:53, Max:62      Intake/Output Summary (Last 24 hours) at 2024 1011  Last data filed at 2024 0852  Gross per 24 hour   Intake 1400 ml   Output 4625 ml   Net -3225 ml     Weight (last 2 days)       Date/Time Weight    24 1016 124 (272.71)    24 0600 125 (276.24)    24 0555 125 (275.58)                Telemetry Review: Off    Physical Exam  Cardiovascular:      Rate and Rhythm: Normal rate and regular rhythm.      Heart sounds: Normal heart sounds. No murmur heard.     No friction rub. No gallop. "   Pulmonary:      Breath sounds: Normal breath sounds. No wheezing or rales.   Musculoskeletal:      Right lower le+ Pitting Edema present.      Left lower le+ Pitting Edema present.           Laboratory Results:        CBC with diff:   Results from last 7 days   Lab Units 24  0557 24  0536 24  1018 24  0902 24  1736 24  0527 24  2248 24  1523 24  1402   WBC Thousand/uL 3.41* 3.03* 2.49* 2.73*  --  3.32*  --  3.24* 3.60*   HEMOGLOBIN g/dL 7.5* 7.4* 7.3* 7.4*  --  7.6* 5.7* 4.8* 4.9*   HEMATOCRIT % 27.3* 27.3* 24.1* 25.0*  --  25.7* 19.3* 17.6* 17.6*   MCV fL 85 86 77* 79*  --  79*  --  74* 74*   PLATELETS Thousands/uL 43* 45* 38* 40* 41* 45*  --  45* 47*   RBC Million/uL 3.23* 3.19* 3.15* 3.17*  --  3.25*  --  2.37* 2.39*   MCH pg 23.2* 23.2* 23.2* 23.3*  --  23.4*  --  20.3* 20.5*   MCHC g/dL 27.5* 27.1* 30.3* 29.6*  --  29.6*  --  27.3* 27.8*   RDW % 23.0* 22.6* 21.2* 19.9*  --  19.9*  --  19.0* 18.9*   NRBC AUTO /100 WBCs  --   --   --  0  --   --   --  1 1         CMP:  Results from last 7 days   Lab Units 24  0514 24  0557 24  0536 24  0502 24  0902 24  0527 24  1402   POTASSIUM mmol/L 3.7 4.0 4.4 3.9 3.6 3.2* 3.7   CHLORIDE mmol/L 105 106 105 106 108 106 106   CO2 mmol/L 27 24 23 25 30 28 28   BUN mg/dL 12 12 13 11 15 19 25   CREATININE mg/dL 0.91 0.88 0.85 0.80 0.90 0.99 1.01   CALCIUM mg/dL 8.2* 8.2* 8.1* 8.2* 8.3* 8.3* 8.8   AST U/L  --  69* 92* 68* 56*  --  50*   ALT U/L  --  41 43 41 43  --  42   ALK PHOS U/L  --  207* 214* 219* 233*  --  219*   EGFR ml/min/1.73sq m 85 87 88 90 86 76 75         BMP:  Results from last 7 days   Lab Units 24  0514 24  0557 24  0536 24  0502 24  0902 24  0527 24  1402   POTASSIUM mmol/L 3.7 4.0 4.4 3.9 3.6 3.2* 3.7   CHLORIDE mmol/L 105 106 105 106 108 106 106   CO2 mmol/L 27 24 23 25 30 28 28   BUN mg/dL 12 12 13 11 15 19 25    CREATININE mg/dL 0.91 0.88 0.85 0.80 0.90 0.99 1.01   CALCIUM mg/dL 8.2* 8.2* 8.1* 8.2* 8.3* 8.3* 8.8       BNP:     Magnesium:   Results from last 7 days   Lab Units 02/24/24  0514   MAGNESIUM mg/dL 1.8*       Coags:   Results from last 7 days   Lab Units 02/20/24  0902 02/19/24  1736 02/18/24  1402   PTT seconds  --  48* 50*   INR  1.42* 1.46* 1.49*       TSH:       Lipid Profile:             Cardiac testing:   No results found for this or any previous visit.    No results found for this or any previous visit.    No results found for this or any previous visit.    No results found for this or any previous visit.      Meds/Allergies   Current Facility-Administered Medications   Medication Dose Route Frequency Provider Last Rate    acetaminophen  650 mg Oral Q6H PRN Lou Smith MD      benzonatate  200 mg Oral TID PRN Lou Smith MD      bumetanide  2 mg Intravenous BID Jann Martinez MD      dextromethorphan-guaiFENesin  10 mL Oral Q4H PRN Alize Sanchez PA-C      fluticasone  1 spray Each Nare Daily Yajaira Boone MD      insulin lispro  1-5 Units Subcutaneous TID AC Lou Smith MD      insulin lispro  1-5 Units Subcutaneous HS Lou Smith MD      iron sucrose  300 mg Intravenous Once per day on Monday Wednesday Friday Winston Mcneill, DO      multivitamin stress formula  1 tablet Oral Daily Lou Smith MD      ondansetron  4 mg Intravenous Q6H PRN Lou Smith MD      spironolactone  50 mg Oral Daily Cait Heller,       tamsulosin  0.4 mg Oral Daily With Dinner Lou Smith MD      traZODone  50 mg Oral HS Lou Smith MD          Medications Prior to Admission   Medication    levofloxacin (LEVAQUIN) 750 mg tablet    benzonatate (TESSALON) 200 MG capsule    furosemide (LASIX) 20 mg tablet    glimepiride (AMARYL) 4 mg tablet    Insulin Pen Needle (BD Pen Needle Nayla 2nd Gen) 32G X 4 MM MISC    Lantus SoloStar 100 units/mL SOPN    Multiple Vitamin (multivitamin)  capsule    polyethylene glycol (GOLYTELY) 4000 mL solution    tamsulosin (FLOMAX) 0.4 mg    traZODone (DESYREL) 50 mg tablet       Assessment:  Principal Problem:    Symptomatic anemia  Active Problems:    DM2 (diabetes mellitus, type 2) (HCC)    Essential hypertension    Cirrhosis, alcoholic (HCC)    MEG (obstructive sleep apnea)    Pancytopenia (HCC)    Thrombocytopenia (HCC)    Diabetic ulcer of left foot associated with type 2 diabetes mellitus (HCC)    Stage 3a chronic kidney disease (HCC)    Murmur    Urinary retention    Volume overload    Chest wall mass    Cough

## 2024-02-25 LAB
ANION GAP SERPL CALCULATED.3IONS-SCNC: 4 MMOL/L
BUN SERPL-MCNC: 11 MG/DL (ref 5–25)
CALCIUM SERPL-MCNC: 8.6 MG/DL (ref 8.4–10.2)
CHLORIDE SERPL-SCNC: 102 MMOL/L (ref 96–108)
CO2 SERPL-SCNC: 30 MMOL/L (ref 21–32)
CREAT SERPL-MCNC: 0.8 MG/DL (ref 0.6–1.3)
ERYTHROCYTE [DISTWIDTH] IN BLOOD BY AUTOMATED COUNT: 24.9 % (ref 11.6–15.1)
GFR SERPL CREATININE-BSD FRML MDRD: 90 ML/MIN/1.73SQ M
GLUCOSE SERPL-MCNC: 143 MG/DL (ref 65–140)
GLUCOSE SERPL-MCNC: 210 MG/DL (ref 65–140)
GLUCOSE SERPL-MCNC: 219 MG/DL (ref 65–140)
GLUCOSE SERPL-MCNC: 225 MG/DL (ref 65–140)
GLUCOSE SERPL-MCNC: 258 MG/DL (ref 65–140)
HCT VFR BLD AUTO: 30.2 % (ref 36.5–49.3)
HGB BLD-MCNC: 8.7 G/DL (ref 12–17)
MCH RBC QN AUTO: 23.6 PG (ref 26.8–34.3)
MCHC RBC AUTO-ENTMCNC: 28.8 G/DL (ref 31.4–37.4)
MCV RBC AUTO: 82 FL (ref 82–98)
PLATELET # BLD AUTO: 48 THOUSANDS/UL (ref 149–390)
POTASSIUM SERPL-SCNC: 3.6 MMOL/L (ref 3.5–5.3)
RBC # BLD AUTO: 3.68 MILLION/UL (ref 3.88–5.62)
SODIUM SERPL-SCNC: 136 MMOL/L (ref 135–147)
WBC # BLD AUTO: 3.33 THOUSAND/UL (ref 4.31–10.16)

## 2024-02-25 PROCEDURE — 99232 SBSQ HOSP IP/OBS MODERATE 35: CPT | Performed by: FAMILY MEDICINE

## 2024-02-25 PROCEDURE — 85027 COMPLETE CBC AUTOMATED: CPT | Performed by: FAMILY MEDICINE

## 2024-02-25 PROCEDURE — 82948 REAGENT STRIP/BLOOD GLUCOSE: CPT

## 2024-02-25 PROCEDURE — 80048 BASIC METABOLIC PNL TOTAL CA: CPT | Performed by: INTERNAL MEDICINE

## 2024-02-25 PROCEDURE — 99232 SBSQ HOSP IP/OBS MODERATE 35: CPT | Performed by: INTERNAL MEDICINE

## 2024-02-25 RX ORDER — INSULIN GLARGINE 100 [IU]/ML
10 INJECTION, SOLUTION SUBCUTANEOUS
Status: DISCONTINUED | OUTPATIENT
Start: 2024-02-25 | End: 2024-02-28 | Stop reason: HOSPADM

## 2024-02-25 RX ADMIN — INSULIN LISPRO 2 UNITS: 100 INJECTION, SOLUTION INTRAVENOUS; SUBCUTANEOUS at 12:45

## 2024-02-25 RX ADMIN — BUMETANIDE 2 MG: 0.25 INJECTION INTRAMUSCULAR; INTRAVENOUS at 02:20

## 2024-02-25 RX ADMIN — INSULIN LISPRO 1 UNITS: 100 INJECTION, SOLUTION INTRAVENOUS; SUBCUTANEOUS at 18:06

## 2024-02-25 RX ADMIN — BUMETANIDE 2 MG: 0.25 INJECTION INTRAMUSCULAR; INTRAVENOUS at 18:07

## 2024-02-25 RX ADMIN — BENZONATATE 200 MG: 100 CAPSULE ORAL at 11:28

## 2024-02-25 RX ADMIN — INSULIN LISPRO 2 UNITS: 100 INJECTION, SOLUTION INTRAVENOUS; SUBCUTANEOUS at 21:33

## 2024-02-25 RX ADMIN — SPIRONOLACTONE 50 MG: 50 TABLET, FILM COATED ORAL at 11:31

## 2024-02-25 RX ADMIN — FLUTICASONE PROPIONATE 1 SPRAY: 50 SPRAY, METERED NASAL at 11:29

## 2024-02-25 RX ADMIN — GUAIFENESIN AND DEXTROMETHORPHAN 10 ML: 100; 10 SYRUP ORAL at 11:28

## 2024-02-25 RX ADMIN — TAMSULOSIN HYDROCHLORIDE 0.4 MG: 0.4 CAPSULE ORAL at 18:07

## 2024-02-25 RX ADMIN — ACETAMINOPHEN 650 MG: 325 TABLET, FILM COATED ORAL at 11:28

## 2024-02-25 RX ADMIN — B-COMPLEX W/ C & FOLIC ACID TAB 1 TABLET: TAB at 11:29

## 2024-02-25 RX ADMIN — INSULIN GLARGINE 10 UNITS: 100 INJECTION, SOLUTION SUBCUTANEOUS at 21:32

## 2024-02-25 NOTE — ASSESSMENT & PLAN NOTE
Lab Results   Component Value Date    HGBA1C 7.3 (H) 08/18/2023       Recent Labs     02/24/24  2038 02/25/24  0628 02/25/24  1151 02/25/24  1645   POCGLU 233* 143* 225* 210*         Blood Sugar Average: Last 72 hrs:  (P) 187.0718708855021692  Lantus was decreased from 40 units to 30 units the night of 2/19 and patient was hypoglycemic on 2/20 requiring dextrose pushes and a few hours of D5NS as he was NPO for EGD. Additionally, Amaryl was discontinued and Lantus was further decreased from 30 to 15 units last night. Of note, patient did not receive Lantus last night and was hypoglycemic again at 64 requiring dextorse. Will start on lantus  Continue SSI and glucose checks  Hypoglycemia protocol

## 2024-02-25 NOTE — PLAN OF CARE
Problem: SKIN/TISSUE INTEGRITY - ADULT  Goal: Incision(s), wounds(s) or drain site(s) healing without S/S of infection  Description: INTERVENTIONS  - Assess and document dressing, incision, wound bed, drain sites and surrounding tissue  - Provide patient and family education  - Perform skin care/dressing changes every 24 hours  Outcome: Progressing

## 2024-02-25 NOTE — ASSESSMENT & PLAN NOTE
Lab Results   Component Value Date    EGFR 85 02/24/2024    EGFR 87 02/23/2024    EGFR 88 02/22/2024    CREATININE 0.91 02/24/2024    CREATININE 0.88 02/23/2024    CREATININE 0.85 02/22/2024   Monitor renal function  Diuretic recs per cardiology.  Monitor creatinine with diuresis

## 2024-02-25 NOTE — ASSESSMENT & PLAN NOTE
Surgical oncology consultation appreciated.  Recommended IR biopsy.    IR consult for biopsy for next week once the patient has undergone diuresis- will discuss with surgery

## 2024-02-25 NOTE — PROGRESS NOTES
[facility]  Progress Note  Name: Isael Avendano I  MRN: [id number]  Unit/Bed#: NW8 861-02 I Date of Admission: 2/18/2024   Date of Service: 2/24/2024 I Hospital Day: 6    Assessment/Plan   * Symptomatic anemia  Assessment & Plan  GI and Heme/Onc following. 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  History suggestive of postnasal drip.    Previous imaging study is reviewed.  Will order a CT chest once the patient becomes more euvolemic    Chest wall mass  Assessment & Plan  Surgical oncology consultation appreciated.  Recommended IR biopsy.    IR consult for biopsy for next week once the patient has undergone diuresis    Volume overload  Assessment & Plan  Patient with significant edema bilateral lower extremities and ascites.  Cardiology progress note and consultation appreciated.  Likely secondary to cirrhosis and low albumin rather than diastolic congestive heart failure  Currently on Bumex.  Monitor electrolytes and creatinine while on diuresis  Patient diuresing well on Bumex    Urinary retention  Assessment & Plan  Continue flomax    Murmur  Assessment & Plan  Noted, echo revealed LVE 65%, Grade 2 DD  Given diffuse edema will consult cardiology to rule out cardiac cause for edema  Cardiology limitation appreciated.-Outpatient follow-up with cardiology       Stage 3a chronic kidney disease (HCC)  Assessment & Plan  Lab Results   Component Value Date    EGFR 85 02/24/2024    EGFR  87 02/23/2024    EGFR 88 02/22/2024    CREATININE 0.91 02/24/2024    CREATININE 0.88 02/23/2024    CREATININE 0.85 02/22/2024   Monitor renal function  Diuretic recs per cardiology.  Monitor creatinine with diuresis    Diabetic ulcer of left foot associated with type 2 diabetes mellitus (HCC)  Assessment & Plan  Lab Results   Component Value Date    HGBA1C 7.3 (H) 08/18/2023       Recent Labs     02/23/24  2034 02/24/24  0724 02/24/24  1145 02/24/24  1632   POCGLU 194* 116 225* 242*       Patient has been on Levquin started in [place], was told he had contracted a bacteria from the water?   Unable to see records in [facility] from [facility]    Phone number [phone] Dr. Fong.   Podiatry following   MRI concerning for OM--no acute infection per podiatry.  Will DC antibiotics.  Discussed with podiatry does not feel like the patient needs to be on antibiotics at this point.  Surgical plan per podiatry.  Cardiology evaluation appreciated.    Patient is definitely high risk for any surgical intervention but risk are non modifiable-as per podiatry progress note they are not planning for any surgical intervention.  Will DC antibiotics and monitor    Thrombocytopenia (HCC)  Assessment & Plan  Platelets are 43. 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  Removed a total of 3.8 L of fluid.  Continue with diuresis    Essential hypertension  Assessment & Plan  BP stable,   Continue with diuresis    DM2 (diabetes mellitus, type 2) (HCC)  Assessment & Plan  Lab Results   Component Value Date    HGBA1C 7.3 (H) 08/18/2023       Recent Labs      02/23/24 2034 02/24/24  0724 02/24/24  1145 02/24/24  1632   POCGLU 194* 116 225* 242*         Blood Sugar Average: Last 72 hrs:  (P) 159.3938141300325882  Lantus was decreased from 40 units to 30 units the night of 2/19 and patient was hypoglycemic on 2/20 requiring dextrose pushes and a few hours of D5NS as he was NPO for EGD. Additionally, Amaryl was discontinued and Lantus was further decreased from 30 to 15 units last night. Of note, patient did not receive Lantus last night and was hypoglycemic again at 64 requiring dextorse. Will hold scheduled Lantus for now  Continue SSI and glucose checks  Hypoglycemia protocol                  VTE Pharmacologic Prophylaxis: VTE Score: 4 contraindicated    Mobility:   Basic Mobility Inpatient Raw Score: 23  JH-HLM Goal: 7: Walk 25 feet or more  JH-HLM Achieved: 7: Walk 25 feet or more  HLM Goal achieved. Continue to encourage appropriate mobility.    Patient Centered Rounds: I performed bedside rounds with nursing staff today.   Discussions with Specialists or Other Care Team Provider:     Education and Discussions with Family / Patient: Updated  (wife) at bedside.    Total Time Spent on Date of Encounter in care of patient: 35 mins. This time was spent on one or more of the following: performing physical exam; counseling and coordination of care; obtaining or reviewing history; documenting in the medical record; reviewing/ordering tests, medications or procedures; communicating with other healthcare professionals and discussing with patient's family/caregivers.    Current Length of Stay: 6 day(s)  Current Patient Status: Inpatient   Certification Statement: Need continued inpatient stay due to diuresis  Discharge Plan: Anticipate discharge in 48-72 hrs to home with home services.    Code Status: Level 1 - Full Code    Subjective:   Patient seen and examined.  Patient is diuresing well.  No specific complaints of    Objective:     Vitals:   Temp (24hrs),  Av °F (36.7 °C), Min:98 °F (36.7 °C), Max:98.1 °F (36.7 °C)    Temp:  [98 °F (36.7 °C)-98.1 °F (36.7 °C)] 98 °F (36.7 °C)  HR:  [89-98] 89  Resp:  [16-20] 20  BP: (101-129)/(53-62) 129/59  SpO2:  [96 %-100 %] 100 %  Body mass index is 37.77 kg/m².     Input and Output Summary (last 24 hours):     Intake/Output Summary (Last 24 hours) at 2024  Last data filed at 2024 1801  Gross per 24 hour   Intake 200 ml   Output 3700 ml   Net -3500 ml       Physical Exam:   Physical Exam  Constitutional:       General: He is not in acute distress.  HENT:      Head: Normocephalic.      Nose: Nose normal.   Eyes:      General: No scleral icterus.  Cardiovascular:      Rate and Rhythm: Normal rate and regular rhythm.      Pulses: Normal pulses.   Pulmonary:      Effort: Pulmonary effort is normal. No respiratory distress.      Breath sounds: No stridor.   Abdominal:      General: Bowel sounds are normal. There is distension.   Musculoskeletal:      Right lower leg: Edema present.      Left lower leg: Edema present.      Comments: Bilateral lower extremity edema with chronic venous stasis changes   Neurological:      General: No focal deficit present.      Mental Status: He is alert.          Additional Data:     Labs:  Results from last 7 days   Lab Units 24  1215 24  1018 24  0902   WBC Thousand/uL 3.55*   < > 2.73*   HEMOGLOBIN g/dL 7.9*   < > 7.4*   HEMATOCRIT % 27.3*   < > 25.0*   PLATELETS Thousands/uL 44*   < > 40*   NEUTROS PCT %  --   --  61   LYMPHS PCT %  --   --  18   MONOS PCT %  --   --  10   EOS PCT %  --   --  10*    < > = values in this interval not displayed.     Results from last 7 days   Lab Units 24  0514 24  0557   SODIUM mmol/L 139 137   POTASSIUM mmol/L 3.7 4.0   CHLORIDE mmol/L 105 106   CO2 mmol/L 27 24   BUN mg/dL 12 12   CREATININE mg/dL 0.91 0.88   ANION GAP mmol/L 7 7   CALCIUM mg/dL 8.2* 8.2*   ALBUMIN g/dL  --  2.6*   TOTAL BILIRUBIN mg/dL  --  2.25*    ALK PHOS U/L  --  207*   ALT U/L  --  41   AST U/L  --  69*   GLUCOSE RANDOM mg/dL 112 102     Results from last 7 days   Lab Units 02/20/24  0902   INR  1.42*     Results from last 7 days   Lab Units 02/24/24  1632 02/24/24  1145 02/24/24  0724 02/23/24  2034 02/23/24  1721 02/23/24  1139 02/23/24  0733 02/22/24  2039 02/22/24  1646 02/22/24  1102 02/21/24  2122 02/21/24  1626   POC GLUCOSE mg/dl 242* 225* 116 194* 166* 208* 113 196* 183* 170* 152* 142*               Lines/Drains:  Invasive Devices       Peripheral Intravenous Line  Duration             Peripheral IV 02/22/24 Dorsal (posterior);Right Hand 2 days                          Imaging: No pertinent imaging reviewed.    Recent Cultures (last 7 days):   Results from last 7 days   Lab Units 02/21/24  1530 02/19/24  1537 02/18/24  1908   GRAM STAIN RESULT  1+ Polys  No bacteria seen No Polys or Bacteria seen No Polys or Bacteria seen   WOUND CULTURE   --   --  Few Colonies of   BODY FLUID CULTURE, STERILE  No growth No growth  --        Last 24 Hours Medication List:   Current Facility-Administered Medications   Medication Dose Route Frequency Provider Last Rate    acetaminophen  650 mg Oral Q6H PRN Lou Smith MD      benzonatate  200 mg Oral TID PRN Lou Smith MD      bumetanide  2 mg Intravenous BID Jann Martinez MD      dextromethorphan-guaiFENesin  10 mL Oral Q4H PRN Alize Sanchez PA-C      fluticasone  1 spray Each Nare Daily Yajaira Boone MD      insulin lispro  1-5 Units Subcutaneous TID AC Lou Smith MD      insulin lispro  1-5 Units Subcutaneous HS Lou Smith MD      iron sucrose  300 mg Intravenous Once per day on Monday Wednesday Friday Winston Mcneill, DO      multivitamin stress formula  1 tablet Oral Daily Lou Smith MD      ondansetron  4 mg Intravenous Q6H PRN Lou Smith MD      spironolactone  50 mg Oral Daily Cait Heller, DO      tamsulosin  0.4 mg Oral Daily With Dinner Lou Smith MD       traZODone  50 mg Oral HS Lou Smith MD          Today, Patient Was Seen By: Yajaira Boone MD    **Please Note: This note may have been constructed using a voice recognition system.**

## 2024-02-25 NOTE — ASSESSMENT & PLAN NOTE
Lab Results   Component Value Date    HGBA1C 7.3 (H) 08/18/2023       Recent Labs     02/23/24  2034 02/24/24  0724 02/24/24  1145 02/24/24  1632   POCGLU 194* 116 225* 242*         Blood Sugar Average: Last 72 hrs:  (P) 159.7322002317212630  Lantus was decreased from 40 units to 30 units the night of 2/19 and patient was hypoglycemic on 2/20 requiring dextrose pushes and a few hours of D5NS as he was NPO for EGD. Additionally, Amaryl was discontinued and Lantus was further decreased from 30 to 15 units last night. Of note, patient did not receive Lantus last night and was hypoglycemic again at 64 requiring dextorse. Will hold scheduled Lantus for now  Continue SSI and glucose checks  Hypoglycemia protocol

## 2024-02-25 NOTE — PROGRESS NOTES
[facility]  Progress Note  Name: Isael Avendano I  MRN: [id number]  Unit/Bed#: NW8 861-02 I Date of Admission: 2/18/2024   Date of Service: 2/25/2024 I Hospital Day: 7    Assessment/Plan   * Symptomatic anemia  Assessment & Plan  GI and Heme/Onc following. 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  History suggestive of postnasal drip.    Previous imaging study is reviewed.  Will order a CT chest once the patient becomes more euvolemic    Chest wall mass  Assessment & Plan  Surgical oncology consultation appreciated.  Recommended IR biopsy.    IR consult for biopsy for next week once the patient has undergone diuresis- will discuss with surgery    Volume overload  Assessment & Plan  Patient with significant edema bilateral lower extremities and ascites.  Cardiology progress note and consultation appreciated.  Likely secondary to cirrhosis and low albumin rather than diastolic congestive heart failure  Currently on Bumex.  Monitor electrolytes and creatinine while on diuresis  Patient diuresing well on Bumex    Urinary retention  Assessment & Plan  Continue flomax    Murmur  Assessment & Plan  Noted, echo revealed LVE 65%, Grade 2 DD  Given diffuse edema will consult cardiology to rule out cardiac cause for edema  Cardiology limitation appreciated.-Outpatient follow-up with cardiology       Stage 3a chronic kidney disease (HCC)  Assessment & Plan  Lab Results   Component Value Date     EGFR 90 02/25/2024    EGFR 85 02/24/2024    EGFR 87 02/23/2024    CREATININE 0.80 02/25/2024    CREATININE 0.91 02/24/2024    CREATININE 0.88 02/23/2024   Monitor renal function  Diuretic recs per cardiology.  Monitor creatinine with diuresis    Diabetic ulcer of left foot associated with type 2 diabetes mellitus (HCC)  Assessment & Plan  Lab Results   Component Value Date    HGBA1C 7.3 (H) 08/18/2023       Recent Labs     02/24/24  2038 02/25/24  0628 02/25/24  1151 02/25/24  1645   POCGLU 233* 143* 225* 210*       Patient has been on Levquin started in [place], was told he had contracted a bacteria from the water?   Unable to see records in [facility] from [facility]    Phone number [phone] Dr. Fong.   Podiatry following   MRI concerning for OM--no acute infection per podiatry.  Will DC antibiotics.  Discussed with podiatry does not feel like the patient needs to be on antibiotics at this point.  Surgical plan per podiatry.  Cardiology evaluation appreciated.    Patient is definitely high risk for any surgical intervention but risk are non modifiable-as per podiatry progress note they are not planning for any surgical intervention.  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  Removed a total of 3.8 L of fluid.  Continue with diuresis    Essential hypertension  Assessment & Plan  BP stable,   Continue with diuresis    DM2 (diabetes mellitus, type 2) ([facility])  Assessment & Plan  Lab Results   Component Value Date    HGBA1C 7.3 (H) 08/18/2023        Recent Labs     02/24/24 2038 02/25/24  0628 02/25/24  1151 02/25/24  1645   POCGLU 233* 143* 225* 210*         Blood Sugar Average: Last 72 hrs:  (P) [id number]  Lantus was decreased from 40 units to 30 units the night of 2/19 and patient was hypoglycemic on 2/20 requiring dextrose pushes and a few hours of D5NS as he was NPO for EGD. Additionally, Amaryl was discontinued and Lantus was further decreased from 30 to 15 units last night. Of note, patient did not receive Lantus last night and was hypoglycemic again at 64 requiring dextorse. Will start on lantus  Continue SSI and glucose checks  Hypoglycemia protocol                  VTE Pharmacologic Prophylaxis: VTE Score: 4 Moderate Risk (Score 3-4) - Pharmacological DVT Prophylaxis Contraindicated. Sequential Compression Devices Ordered.    Mobility:   Basic Mobility Inpatient Raw Score: 23  JH-HLM Goal: 7: Walk 25 feet or more  JH-HLM Achieved: 7: Walk 25 feet or more  HLM Goal achieved. Continue to encourage appropriate mobility.    Patient Centered Rounds: I performed bedside rounds with nursing staff today.   Discussions with Specialists or Other Care Team Provider:     Education and Discussions with Family / Patient: Updated  (wife) at bedside.    Total Time Spent on Date of Encounter in care of patient: 35 mins. This time was spent on one or more of the following: performing physical exam; counseling and coordination of care; obtaining or reviewing history; documenting in the medical record; reviewing/ordering tests, medications or procedures; communicating with other healthcare professionals and discussing with patient's family/caregivers.    Current Length of Stay: 7 day(s)  Current Patient Status: Inpatient   Certification Statement: The patient will continue to require additional inpatient hospital stay due to iv duiresis   Discharge Plan: Anticipate discharge in 24-48 hrs to home with home services.    Code Status: Level 1  - Full Code    Subjective:   Seen and examined.  Currently getting diuresed with IV Bumex.  Good response to IV Bumex.    Objective:     Vitals:   Temp (24hrs), Av.3 °F (36.8 °C), Min:97.8 °F (36.6 °C), Max:99 °F (37.2 °C)    Temp:  [97.8 °F (36.6 °C)-99 °F (37.2 °C)] 97.8 °F (36.6 °C)  HR:  [81-95] 81  Resp:  [16-18] 16  BP: (119-133)/(59-60) 119/59  SpO2:  [98 %-100 %] 98 %  Body mass index is 36.92 kg/m².     Input and Output Summary (last 24 hours):     Intake/Output Summary (Last 24 hours) at 2024 1856  Last data filed at 2024 1648  Gross per 24 hour   Intake 360 ml   Output 6375 ml   Net -6015 ml       Physical Exam:   Physical Exam  Constitutional:       General: He is not in acute distress.     Appearance: He is not ill-appearing.   HENT:      Head: Normocephalic.      Nose: Nose normal.   Eyes:      General: No scleral icterus.  Cardiovascular:      Rate and Rhythm: Normal rate and regular rhythm.      Pulses: Normal pulses.      Heart sounds: No murmur heard.  Pulmonary:      Effort: Pulmonary effort is normal. No respiratory distress.   Abdominal:      General: Bowel sounds are normal.   Musculoskeletal:         General: No swelling. Normal range of motion.      Right lower leg: Edema present.      Left lower leg: Edema present.      Comments: Left fifth toe wound covered in dressing   Skin:     General: Skin is warm.      Coloration: Skin is not jaundiced.   Neurological:      Mental Status: He is alert. Mental status is at baseline.          Additional Data:     Labs:  Results from last 7 days   Lab Units 24  1128 24  1018 24  0902   WBC Thousand/uL 3.33*   < > 2.73*   HEMOGLOBIN g/dL 8.7*   < > 7.4*   HEMATOCRIT % 30.2*   < > 25.0*   PLATELETS Thousands/uL 48*   < > 40*   NEUTROS PCT %  --   --  61   LYMPHS PCT %  --   --  18   MONOS PCT %  --   --  10   EOS PCT %  --   --  10*    < > = values in this interval not displayed.     Results from last 7 days   Lab Units  02/25/24  1128 02/24/24  0514 02/23/24  0557   SODIUM mmol/L 136   < > 137   POTASSIUM mmol/L 3.6   < > 4.0   CHLORIDE mmol/L 102   < > 106   CO2 mmol/L 30   < > 24   BUN mg/dL 11   < > 12   CREATININE mg/dL 0.80   < > 0.88   ANION GAP mmol/L 4   < > 7   CALCIUM mg/dL 8.6   < > 8.2*   ALBUMIN g/dL  --   --  2.6*   TOTAL BILIRUBIN mg/dL  --   --  2.25*   ALK PHOS U/L  --   --  207*   ALT U/L  --   --  41   AST U/L  --   --  69*   GLUCOSE RANDOM mg/dL 219*   < > 102    < > = values in this interval not displayed.     Results from last 7 days   Lab Units 02/20/24  0902   INR  1.42*     Results from last 7 days   Lab Units 02/25/24  1645 02/25/24  1151 02/25/24  0628 02/24/24  2038 02/24/24  1632 02/24/24  1145 02/24/24  0724 02/23/24  2034 02/23/24  1721 02/23/24  1139 02/23/24  0733 02/22/24 2039   POC GLUCOSE mg/dl 210* 225* 143* 233* 242* 225* 116 194* 166* 208* 113 196*               Lines/Drains:  Invasive Devices       Peripheral Intravenous Line  Duration             Peripheral IV 02/22/24 Dorsal (posterior);Right Hand 3 days                          Imaging: No pertinent imaging reviewed.    Recent Cultures (last 7 days):   Results from last 7 days   Lab Units 02/21/24  1530 02/19/24  1537 02/18/24  1908   GRAM STAIN RESULT  1+ Polys  No bacteria seen No Polys or Bacteria seen No Polys or Bacteria seen   WOUND CULTURE   --   --  Few Colonies of   BODY FLUID CULTURE, STERILE  No growth No growth  --        Last 24 Hours Medication List:   Current Facility-Administered Medications   Medication Dose Route Frequency Provider Last Rate    acetaminophen  650 mg Oral Q6H PRN Lou Smith MD      benzonatate  200 mg Oral TID PRN Lou Smith MD      bumetanide  2 mg Intravenous BID Jann Martinez MD      dextromethorphan-guaiFENesin  10 mL Oral Q4H PRN Alize Sanchez PA-C      fluticasone  1 spray Each Nare Daily Yajaira Boone MD      insulin glargine  10 Units Subcutaneous HS Yajaira Boone MD       insulin lispro  1-5 Units Subcutaneous TID AC Lou Smith MD      insulin lispro  1-5 Units Subcutaneous HS Lou Smith MD      iron sucrose  300 mg Intravenous Once per day on Monday Wednesday Friday Winston Mcneill, DO      multivitamin stress formula  1 tablet Oral Daily Lou Smith MD      ondansetron  4 mg Intravenous Q6H PRN Lou Smith MD      spironolactone  50 mg Oral Daily Cait Heller, DO      tamsulosin  0.4 mg Oral Daily With Dinner Lou Smith MD      traZODone  50 mg Oral HS Lou Smith MD          Today, Patient Was Seen By: Yajaira Boone MD    **Please Note: This note may have been constructed using a voice recognition system.**

## 2024-02-25 NOTE — ASSESSMENT & PLAN NOTE
Lab Results   Component Value Date    HGBA1C 7.3 (H) 08/18/2023       Recent Labs     02/23/24  2034 02/24/24  0724 02/24/24  1145 02/24/24  1632   POCGLU 194* 116 225* 242*       Patient has been on Levquin started in [place], was told he had contracted a bacteria from the water?   Unable to see records in [facility] from [facility]    Phone number [phone] Dr. Fong.   Podiatry following   MRI concerning for OM--no acute infection per podiatry.  Will DC antibiotics.  Discussed with podiatry does not feel like the patient needs to be on antibiotics at this point.  Surgical plan per podiatry.  Cardiology evaluation appreciated.    Patient is definitely high risk for any surgical intervention but risk are non modifiable-as per podiatry progress note they are not planning for any surgical intervention.  Will DC antibiotics and monitor

## 2024-02-25 NOTE — ASSESSMENT & PLAN NOTE
Lab Results   Component Value Date    EGFR 90 02/25/2024    EGFR 85 02/24/2024    EGFR 87 02/23/2024    CREATININE 0.80 02/25/2024    CREATININE 0.91 02/24/2024    CREATININE 0.88 02/23/2024   Monitor renal function  Diuretic recs per cardiology.  Monitor creatinine with diuresis

## 2024-02-25 NOTE — ASSESSMENT & PLAN NOTE
Lab Results   Component Value Date    HGBA1C 7.3 (H) 08/18/2023       Recent Labs     02/24/24  2038 02/25/24  0628 02/25/24  1151 02/25/24  1645   POCGLU 233* 143* 225* 210*       Patient has been on Levquin started in [place], was told he had contracted a bacteria from the water?   Unable to see records in [facility] from [facility]    Phone number [phone] Dr. Fong.   Podiatry following   MRI concerning for OM--no acute infection per podiatry.  Will DC antibiotics.  Discussed with podiatry does not feel like the patient needs to be on antibiotics at this point.  Surgical plan per podiatry.  Cardiology evaluation appreciated.    Patient is definitely high risk for any surgical intervention but risk are non modifiable-as per podiatry progress note they are not planning for any surgical intervention.  Will DC antibiotics and monitor

## 2024-02-25 NOTE — PROGRESS NOTES
"Cardiology Progress Note - Isael Avendano 68 y.o. male MRN: [id number]    Unit/Bed#: NW8 861-02 Encounter: [id number]      Assessment:  Volume overload - predominantly due to ascites with 3rd spacing from low albumin.  Likely not a significant component of diastolic heart failure. Improved diuresis  on IV Bumex.  Anemia - improvement s/p PRBC transfusion.  Cirrhosis - etiology of volume overload.  Osteomyelitis - at acceptable cardiovascular risk for toe amputation.     Plan:  Continue current medications.  No further cardiac w/u necessary at this time.    Patient at acceptable cardiovascular risk to proceed with surgery.  Continue IV Bumex.     Subjective:    No significant events overnight.  Continues to diurese well.     Review of Systems   Cardiovascular:  Positive for leg swelling. Negative for chest pain and palpitations.   Respiratory:  Negative for shortness of breath.        Objective:   Vitals: Blood pressure 133/60, pulse 89, temperature 99 °F (37.2 °C), temperature source Oral, resp. rate 18, height 5' 11\" (1.803 m), weight 117 kg (257 lb 4.4 oz), SpO2 98%., Body mass index is 36.92 kg/m².,   Orthostatic Blood Pressures      Flowsheet Row Most Recent Value   Blood Pressure 133/60 filed at 2024 0820   Patient Position - Orthostatic VS Sitting filed at 2024 0820           Systolic (24hrs), Av , Min:122 , Max:133     Diastolic (24hrs), Av, Min:59, Max:60      Intake/Output Summary (Last 24 hours) at 2024 1056  Last data filed at 2024 0700  Gross per 24 hour   Intake 360 ml   Output 5625 ml   Net -5265 ml     Weight (last 2 days)       Date/Time Weight    24 0650 117 (257.28)    24 0600 117 (257.28)    24 0600 119 (263.23)    24 1016 124 (272.71)    24 0600 125 (276.24)                Telemetry Review: Off    Physical Exam  Cardiovascular:      Rate and Rhythm: Normal rate and regular rhythm.      Heart sounds: Normal heart sounds. No murmur heard.    "  No friction rub. No gallop.   Pulmonary:      Breath sounds: Normal breath sounds. No wheezing or rales.   Musculoskeletal:      Right lower le+ Pitting Edema present.      Left lower le+ Pitting Edema present.           Laboratory Results:        CBC with diff:   Results from last 7 days   Lab Units 24  1215 24  0557 24  0536 24  1018 24  0902 24  1736 24  0527 24  2248 24  1523 24  1402   WBC Thousand/uL 3.55* 3.41* 3.03* 2.49* 2.73*  --  3.32*  --  3.24* 3.60*   HEMOGLOBIN g/dL 7.9* 7.5* 7.4* 7.3* 7.4*  --  7.6* 5.7* 4.8* 4.9*   HEMATOCRIT % 27.3* 27.3* 27.3* 24.1* 25.0*  --  25.7* 19.3* 17.6* 17.6*   MCV fL 81* 85 86 77* 79*  --  79*  --  74* 74*   PLATELETS Thousands/uL 44* 43* 45* 38* 40* 41* 45*  --  45* 47*   RBC Million/uL 3.38* 3.23* 3.19* 3.15* 3.17*  --  3.25*  --  2.37* 2.39*   MCH pg 23.4* 23.2* 23.2* 23.2* 23.3*  --  23.4*  --  20.3* 20.5*   MCHC g/dL 28.9* 27.5* 27.1* 30.3* 29.6*  --  29.6*  --  27.3* 27.8*   RDW % 24.2* 23.0* 22.6* 21.2* 19.9*  --  19.9*  --  19.0* 18.9*   NRBC AUTO /100 WBCs  --   --   --   --  0  --   --   --  1 1         CMP:  Results from last 7 days   Lab Units 24  0514 24  0557 24  0536 24  0502 24  0902 24  0527 24  1402   POTASSIUM mmol/L 3.7 4.0 4.4 3.9 3.6 3.2* 3.7   CHLORIDE mmol/L 105 106 105 106 108 106 106   CO2 mmol/L 27 24 23 25 30 28 28   BUN mg/dL 12 12 13 11 15 19 25   CREATININE mg/dL 0.91 0.88 0.85 0.80 0.90 0.99 1.01   CALCIUM mg/dL 8.2* 8.2* 8.1* 8.2* 8.3* 8.3* 8.8   AST U/L  --  69* 92* 68* 56*  --  50*   ALT U/L  --  41 43 41 43  --  42   ALK PHOS U/L  --  207* 214* 219* 233*  --  219*   EGFR ml/min/1.73sq m 85 87 88 90 86 76 75         BMP:  Results from last 7 days   Lab Units 24  0514 24  0557 24  0536 24  0502 24  0902 24  0527 24  1402   POTASSIUM mmol/L 3.7 4.0 4.4 3.9 3.6 3.2* 3.7   CHLORIDE mmol/L  105 106 105 106 108 106 106   CO2 mmol/L 27 24 23 25 30 28 28   BUN mg/dL 12 12 13 11 15 19 25   CREATININE mg/dL 0.91 0.88 0.85 0.80 0.90 0.99 1.01   CALCIUM mg/dL 8.2* 8.2* 8.1* 8.2* 8.3* 8.3* 8.8       BNP:     Magnesium:   Results from last 7 days   Lab Units 02/24/24  0514   MAGNESIUM mg/dL 1.8*       Coags:   Results from last 7 days   Lab Units 02/20/24  0902 02/19/24  1736 02/18/24  1402   PTT seconds  --  48* 50*   INR  1.42* 1.46* 1.49*       TSH:       Lipid Profile:             Cardiac testing:   No results found for this or any previous visit.    No results found for this or any previous visit.    No results found for this or any previous visit.    No results found for this or any previous visit.      Meds/Allergies   Current Facility-Administered Medications   Medication Dose Route Frequency Provider Last Rate    acetaminophen  650 mg Oral Q6H PRN Lou Smith MD      benzonatate  200 mg Oral TID PRN Lou Smith MD      bumetanide  2 mg Intravenous BID Jann Martinez MD      dextromethorphan-guaiFENesin  10 mL Oral Q4H PRN Alize Sanchez PA-C      fluticasone  1 spray Each Nare Daily Yajaira Boone MD      insulin lispro  1-5 Units Subcutaneous TID AC Lou Smith MD      insulin lispro  1-5 Units Subcutaneous HS Lou Smith MD      iron sucrose  300 mg Intravenous Once per day on Monday Wednesday Friday Winston Mcneill DO      multivitamin stress formula  1 tablet Oral Daily Lou Smith MD      ondansetron  4 mg Intravenous Q6H PRN Lou Smith MD      spironolactone  50 mg Oral Daily Cait Heller DO      tamsulosin  0.4 mg Oral Daily With Dinner Lou Smith MD      traZODone  50 mg Oral HS Lou Smith MD          Medications Prior to Admission   Medication    levofloxacin (LEVAQUIN) 750 mg tablet    benzonatate (TESSALON) 200 MG capsule    furosemide (LASIX) 20 mg tablet    glimepiride (AMARYL) 4 mg tablet    Insulin Pen Needle (BD Pen Needle Nayla 2nd  Gen) 32G X 4 MM MISC    Lantus SoloStar 100 units/mL SOPN    Multiple Vitamin (multivitamin) capsule    polyethylene glycol (GOLYTELY) 4000 mL solution    tamsulosin (FLOMAX) 0.4 mg    traZODone (DESYREL) 50 mg tablet       Assessment:  Principal Problem:    Symptomatic anemia  Active Problems:    DM2 (diabetes mellitus, type 2) (HCC)    Essential hypertension    Cirrhosis, alcoholic (HCC)    MEG (obstructive sleep apnea)    Pancytopenia (HCC)    Thrombocytopenia (HCC)    Diabetic ulcer of left foot associated with type 2 diabetes mellitus (HCC)    Stage 3a chronic kidney disease (HCC)    Murmur    Urinary retention    Volume overload    Chest wall mass    Cough         no

## 2024-02-25 NOTE — ASSESSMENT & PLAN NOTE
Patient with significant edema bilateral lower extremities and ascites.  Cardiology progress note and consultation appreciated.  Likely secondary to cirrhosis and low albumin rather than diastolic congestive heart failure  Currently on Bumex.  Monitor electrolytes and creatinine while on diuresis  Patient diuresing well on Bumex

## 2024-02-26 ENCOUNTER — APPOINTMENT (INPATIENT)
Dept: RADIOLOGY | Facility: HOSPITAL | Age: 69
DRG: 378 | End: 2024-02-26
Payer: MEDICARE

## 2024-02-26 LAB
ANION GAP SERPL CALCULATED.3IONS-SCNC: 7 MMOL/L
BUN SERPL-MCNC: 11 MG/DL (ref 5–25)
CALCIUM SERPL-MCNC: 8.3 MG/DL (ref 8.4–10.2)
CHLORIDE SERPL-SCNC: 103 MMOL/L (ref 96–108)
CO2 SERPL-SCNC: 31 MMOL/L (ref 21–32)
CREAT SERPL-MCNC: 0.7 MG/DL (ref 0.6–1.3)
GFR SERPL CREATININE-BSD FRML MDRD: 95 ML/MIN/1.73SQ M
GLUCOSE SERPL-MCNC: 117 MG/DL (ref 65–140)
GLUCOSE SERPL-MCNC: 144 MG/DL (ref 65–140)
GLUCOSE SERPL-MCNC: 216 MG/DL (ref 65–140)
GLUCOSE SERPL-MCNC: 246 MG/DL (ref 65–140)
GLUCOSE SERPL-MCNC: 251 MG/DL (ref 65–140)
POTASSIUM SERPL-SCNC: 3.5 MMOL/L (ref 3.5–5.3)
SODIUM SERPL-SCNC: 141 MMOL/L (ref 135–147)

## 2024-02-26 PROCEDURE — 82948 REAGENT STRIP/BLOOD GLUCOSE: CPT

## 2024-02-26 PROCEDURE — NC001 PR NO CHARGE: Performed by: RADIOLOGY

## 2024-02-26 PROCEDURE — 99232 SBSQ HOSP IP/OBS MODERATE 35: CPT | Performed by: FAMILY MEDICINE

## 2024-02-26 PROCEDURE — 80048 BASIC METABOLIC PNL TOTAL CA: CPT | Performed by: INTERNAL MEDICINE

## 2024-02-26 PROCEDURE — 99232 SBSQ HOSP IP/OBS MODERATE 35: CPT | Performed by: PODIATRIST

## 2024-02-26 PROCEDURE — 99232 SBSQ HOSP IP/OBS MODERATE 35: CPT | Performed by: INTERNAL MEDICINE

## 2024-02-26 PROCEDURE — 71260 CT THORAX DX C+: CPT

## 2024-02-26 RX ORDER — INSULIN LISPRO 100 [IU]/ML
3 INJECTION, SOLUTION INTRAVENOUS; SUBCUTANEOUS
Status: DISCONTINUED | OUTPATIENT
Start: 2024-02-27 | End: 2024-02-28 | Stop reason: HOSPADM

## 2024-02-26 RX ORDER — POTASSIUM CHLORIDE 20 MEQ/1
40 TABLET, EXTENDED RELEASE ORAL ONCE
Status: COMPLETED | OUTPATIENT
Start: 2024-02-26 | End: 2024-02-26

## 2024-02-26 RX ORDER — BUMETANIDE 0.25 MG/ML
2 INJECTION INTRAMUSCULAR; INTRAVENOUS 2 TIMES DAILY
Status: DISCONTINUED | OUTPATIENT
Start: 2024-02-27 | End: 2024-02-28

## 2024-02-26 RX ADMIN — POTASSIUM CHLORIDE 40 MEQ: 1500 TABLET, EXTENDED RELEASE ORAL at 09:49

## 2024-02-26 RX ADMIN — INSULIN LISPRO 2 UNITS: 100 INJECTION, SOLUTION INTRAVENOUS; SUBCUTANEOUS at 18:08

## 2024-02-26 RX ADMIN — INSULIN LISPRO 1 UNITS: 100 INJECTION, SOLUTION INTRAVENOUS; SUBCUTANEOUS at 13:23

## 2024-02-26 RX ADMIN — IOHEXOL 100 ML: 350 INJECTION, SOLUTION INTRAVENOUS at 12:53

## 2024-02-26 RX ADMIN — IRON SUCROSE 300 MG: 20 INJECTION, SOLUTION INTRAVENOUS at 09:49

## 2024-02-26 RX ADMIN — GUAIFENESIN AND DEXTROMETHORPHAN 10 ML: 100; 10 SYRUP ORAL at 09:59

## 2024-02-26 RX ADMIN — INSULIN GLARGINE 10 UNITS: 100 INJECTION, SOLUTION SUBCUTANEOUS at 21:41

## 2024-02-26 RX ADMIN — BUMETANIDE 2 MG: 0.25 INJECTION INTRAMUSCULAR; INTRAVENOUS at 09:49

## 2024-02-26 RX ADMIN — INSULIN LISPRO 2 UNITS: 100 INJECTION, SOLUTION INTRAVENOUS; SUBCUTANEOUS at 21:41

## 2024-02-26 RX ADMIN — FLUTICASONE PROPIONATE 1 SPRAY: 50 SPRAY, METERED NASAL at 10:08

## 2024-02-26 RX ADMIN — TRAZODONE HYDROCHLORIDE 50 MG: 50 TABLET ORAL at 21:41

## 2024-02-26 RX ADMIN — TAMSULOSIN HYDROCHLORIDE 0.4 MG: 0.4 CAPSULE ORAL at 18:08

## 2024-02-26 RX ADMIN — B-COMPLEX W/ C & FOLIC ACID TAB 1 TABLET: TAB at 09:49

## 2024-02-26 RX ADMIN — SPIRONOLACTONE 50 MG: 50 TABLET, FILM COATED ORAL at 09:48

## 2024-02-26 NOTE — ASSESSMENT & PLAN NOTE
Patient with significant edema bilateral lower extremities and ascites.  Cardiology progress note and consultation appreciated.  Likely secondary to cirrhosis and low albumin rather than diastolic congestive heart failure  Currently on Bumex.  Monitor electrolytes and creatinine while on diuresis-hold the evening dose due to iv contrast- start from tomorrow

## 2024-02-26 NOTE — PROGRESS NOTES
[facility]  Progress Note  Name: Isael Avendano I  MRN: [id number]  Unit/Bed#: NW8 861-02 I Date of Admission: 2/18/2024   Date of Service: 2/26/2024 I Hospital Day: 8    Assessment/Plan   * Symptomatic anemia  Assessment & Plan  GI and Heme/Onc following. 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  History suggestive of postnasal drip.    Previous imaging study is reviewed.  Ct chest reviewed     Chest wall mass  Assessment & Plan  Surgical oncology consultation appreciated.  Recommended IR biopsy.    Consulted IR for the biopsy.  Plan is to hold off on biopsy after discussion with surgical oncology and hematology oncology.,  Outpatient follow-up with heme-onc/gi    Volume overload  Assessment & Plan  Patient with significant edema bilateral lower extremities and ascites.  Cardiology progress note and consultation appreciated.  Likely secondary to cirrhosis and low albumin rather than diastolic congestive heart failure  Currently on Bumex.  Monitor electrolytes and creatinine while on diuresis-hold the evening dose due to iv contrast- start from tomorrow       Urinary retention  Assessment & Plan  Continue flomax    Murmur  Assessment & Plan  Noted, echo revealed LVE 65%, Grade 2 DD  Given diffuse edema will consult cardiology to rule out cardiac cause for edema  Cardiology evaluation appreciated.-Outpatient follow-up with cardiology       Stage 3a  chronic kidney disease (HCC)  Assessment & Plan  Lab Results   Component Value Date    EGFR 95 02/26/2024    EGFR 90 02/25/2024    EGFR 85 02/24/2024    CREATININE 0.70 02/26/2024    CREATININE 0.80 02/25/2024    CREATININE 0.91 02/24/2024   Monitor renal function  Diuretic recs per cardiology.  Monitor creatinine with diuresis  Daigle the pm dose of  bumex due to iv contrast , restart tomorrow     Diabetic ulcer of left foot associated with type 2 diabetes mellitus (HCC)  Assessment & Plan  Lab Results   Component Value Date    HGBA1C 7.3 (H) 08/18/2023       Recent Labs     02/25/24  2050 02/26/24  0750 02/26/24  1229 02/26/24  1637   POCGLU 258* 117 216* 246*       Patient has been on Levquin started in [place], was told he had contracted a bacteria from the water?   Unable to see records in [facility] from [facility]    Phone number [phone] Dr. Fong.   Podiatry following   MRI concerning for OM--no acute infection per podiatry.  Will DC antibiotics.  Discussed with podiatry does not feel like the patient needs to be on antibiotics at this point.  Surgical plan per podiatry.  Cardiology evaluation appreciated.    Patient is definitely high risk for any surgical intervention but risk are non modifiable-as per podiatry progress note they are not planning for any surgical intervention.  Will DC antibiotics and monitor  Podiatry is considering surgical intervention.  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   Removed a total of 3.8 L of fluid.  Continue with diuresis  Patient need outpatient follow-up with gastroenterology    Essential hypertension  Assessment & Plan  BP stable,   Continue with diuresis    DM2 (diabetes mellitus, type 2) ([facility])  Assessment & Plan  Lab Results   Component Value Date    HGBA1C 7.3 (H) 08/18/2023       Recent Labs     02/25/24  2050 02/26/24  0750 02/26/24  1229 02/26/24  1637   POCGLU 258* 117 216* 246*         Blood Sugar Average: Last 72 hrs:  (P) [id number]  Lantus was decreased from 40 units to 30 units the night of 2/19 and patient was hypoglycemic on 2/20 requiring dextrose pushes and a few hours of D5NS as he was NPO for EGD. Additionally, Amaryl was discontinued and Lantus was further decreased from 30 to 15 units last night. Of note, patient did not receive Lantus last night and was hypoglycemic again at 64 requiring dextorse. Will start on lantus  Continue SSI and glucose checks  Hypoglycemia protocol                  VTE Pharmacologic Prophylaxis: VTE Score: 4 contraindicated due to GI bleed and thrombocytopenia  Mobility:   Basic Mobility Inpatient Raw Score: 23  JH-HLM Goal: 7: Walk 25 feet or more  JH-HLM Achieved: 7: Walk 25 feet or more  HLM Goal achieved. Continue to encourage appropriate mobility.    Patient Centered Rounds: I performed bedside rounds with nursing staff today.   Discussions with Specialists or Other Care Team Provider: hematology oncology and IR    Education and Discussions with Family / Patient: Updated  (wife) at bedside.    Total Time Spent on Date of Encounter in care of patient: 45 mins. This time was spent on one or more of the following: performing physical exam; counseling and coordination of care; obtaining or reviewing history; documenting in the medical record; reviewing/ordering tests, medications or procedures; communicating with other healthcare professionals and discussing with patient's family/caregivers.    Current  Length of Stay: 8 day(s)  Current Patient Status: Inpatient   Certification Statement: The patient will continue to require additional inpatient hospital stay due to IV diuresis  Discharge Plan:   Code Status: Level 1 - Full Code    Subjective:   Patient seen and examined.  Discussed with the patient about the biopsy of the chest wall mass as recommended by surgical oncology.  Consulted interventional radiology.  Requested to get a CT with IV contrast to evaluate the mass further.  CAT scan was obtained.  After discussion with hematology oncology surgical oncology and interventional radiology it was decided course of action is outpatient follow-up with outpatient biopsy if indicated  Since the patient received will hold on Bumex for now and restart back tomorrow.  Discussed with the podiatry -plan is to continue with the local wound care.  If there is worsening of the wound or clinical signs of infection plan is to move forward with amputation    Objective:     Vitals:   Temp (24hrs), Av.9 °F (36.6 °C), Min:97.8 °F (36.6 °C), Max:98.2 °F (36.8 °C)    Temp:  [97.8 °F (36.6 °C)-98.2 °F (36.8 °C)] 97.8 °F (36.6 °C)  HR:  [81-96] 81  Resp:  [16-18] 16  BP: (120-124)/(58-60) 124/60  SpO2:  [97 %] 97 %  Body mass index is 36.88 kg/m².     Input and Output Summary (last 24 hours):     Intake/Output Summary (Last 24 hours) at 2024  Last data filed at 2024 1545  Gross per 24 hour   Intake 225 ml   Output 2975 ml   Net -2750 ml       Physical Exam:   Physical Exam  Constitutional:       General: He is not in acute distress.  HENT:      Head: Normocephalic.   Eyes:      General: No scleral icterus.  Cardiovascular:      Rate and Rhythm: Normal rate and regular rhythm.   Pulmonary:      Effort: No respiratory distress.   Abdominal:      General: There is distension.   Musculoskeletal:      Right lower leg: Edema present.      Left lower leg: Edema present.      Comments: Pitting pedal edema with chronic  venous stasis changes   Skin:     General: Skin is warm.      Coloration: Skin is not jaundiced.   Neurological:      General: No focal deficit present.      Mental Status: He is alert. Mental status is at baseline.      Cranial Nerves: No cranial nerve deficit.          Additional Data:     Labs:  Results from last 7 days   Lab Units 02/25/24  1128 02/21/24  1018 02/20/24  0902   WBC Thousand/uL 3.33*   < > 2.73*   HEMOGLOBIN g/dL 8.7*   < > 7.4*   HEMATOCRIT % 30.2*   < > 25.0*   PLATELETS Thousands/uL 48*   < > 40*   NEUTROS PCT %  --   --  61   LYMPHS PCT %  --   --  18   MONOS PCT %  --   --  10   EOS PCT %  --   --  10*    < > = values in this interval not displayed.     Results from last 7 days   Lab Units 02/26/24  0452 02/24/24  0514 02/23/24  0557   SODIUM mmol/L 141   < > 137   POTASSIUM mmol/L 3.5   < > 4.0   CHLORIDE mmol/L 103   < > 106   CO2 mmol/L 31   < > 24   BUN mg/dL 11   < > 12   CREATININE mg/dL 0.70   < > 0.88   ANION GAP mmol/L 7   < > 7   CALCIUM mg/dL 8.3*   < > 8.2*   ALBUMIN g/dL  --   --  2.6*   TOTAL BILIRUBIN mg/dL  --   --  2.25*   ALK PHOS U/L  --   --  207*   ALT U/L  --   --  41   AST U/L  --   --  69*   GLUCOSE RANDOM mg/dL 144*   < > 102    < > = values in this interval not displayed.     Results from last 7 days   Lab Units 02/20/24  0902   INR  1.42*     Results from last 7 days   Lab Units 02/26/24  1637 02/26/24  1229 02/26/24  0750 02/25/24  2050 02/25/24  1645 02/25/24  1151 02/25/24  0628 02/24/24  2038 02/24/24  1632 02/24/24  1145 02/24/24  0724 02/23/24 2034   POC GLUCOSE mg/dl 246* 216* 117 258* 210* 225* 143* 233* 242* 225* 116 194*               Lines/Drains:  Invasive Devices       Peripheral Intravenous Line  Duration             Peripheral IV 02/26/24 Right Antecubital <1 day                          Imaging: CT chest reviewed  Recent Cultures (last 7 days):   Results from last 7 days   Lab Units 02/21/24  1530   GRAM STAIN RESULT  1+ Polys  No bacteria seen    BODY FLUID CULTURE, STERILE  No growth       Last 24 Hours Medication List:   Current Facility-Administered Medications   Medication Dose Route Frequency Provider Last Rate    acetaminophen  650 mg Oral Q6H PRN Lou Smith MD      benzonatate  200 mg Oral TID PRN Lou Smith MD      dextromethorphan-guaiFENesin  10 mL Oral Q4H PRN Alize Sanchez PA-C      fluticasone  1 spray Each Nare Daily Yajaira Boone MD      insulin glargine  10 Units Subcutaneous HS Yajaira Boone MD      insulin lispro  1-5 Units Subcutaneous TID AC Lou Smith MD      insulin lispro  1-5 Units Subcutaneous HS Lou Smith MD      iron sucrose  300 mg Intravenous Once per day on Monday Wednesday Friday Winston Mcneill, DO      multivitamin stress formula  1 tablet Oral Daily Lou Smith MD      ondansetron  4 mg Intravenous Q6H PRN Lou Smith MD      spironolactone  50 mg Oral Daily Cait Heller,       tamsulosin  0.4 mg Oral Daily With Dinner Lou Smith MD      traZODone  50 mg Oral HS Lou Smith MD          Today, Patient Was Seen By: Yajaira Boone MD    **Please Note: This note may have been constructed using a voice recognition system.**

## 2024-02-26 NOTE — ASSESSMENT & PLAN NOTE
Lab Results   Component Value Date    HGBA1C 7.3 (H) 08/18/2023       Recent Labs     02/25/24  2050 02/26/24  0750 02/26/24  1229 02/26/24  1637   POCGLU 258* 117 216* 246*         Blood Sugar Average: Last 72 hrs:  (P) [id number]  Lantus was decreased from 40 units to 30 units the night of 2/19 and patient was hypoglycemic on 2/20 requiring dextrose pushes and a few hours of D5NS as he was NPO for EGD. Additionally, Amaryl was discontinued and Lantus was further decreased from 30 to 15 units last night. Of note, patient did not receive Lantus last night and was hypoglycemic again at 64 requiring dextorse. Will start on lantus  Continue SSI and glucose checks  Hypoglycemia protocol

## 2024-02-26 NOTE — TELEMEDICINE
INTERPROFESSIONAL (ELECTRONIC OR PHONE) CONSULTATION - Interventional Radiology  Isael Avendano 68 y.o. male MRN: [id number]  Unit/Bed#: NW8 861-02 Encounter: [id number]    IR has been consulted regarding the care of Isael Avendano.    We were consulted by primary team concerning this patient with cirrhosis and small mass    Inpatient Consult to IR  Consult performed by: Cady Medrano MD  Consult ordered by: Yajaira Boone MD        02/26/24      Assessment/Recommendation:     68-year-old male with cirrhosis, liver lesions, thrombocytopenia, ascites    MRI abdomen was performed which revealed a small left posterior pleural/chest wall mass.  Images are below.    Surgical oncology was consulted followed by interventional radiology    Patient has multiple active issues including anemia, poor glucose control, varices, cough, volume overload,     This mass is slow-growing.  I requested a CT chest was obtained with contrast which I also reviewed.    Given the size configuration and location of this mass I do not believe it is an acute issue    Given his thrombocytopenia, biopsy is at higher risk of bleeding, this location is also near intercostal arteries    His liver lesions are LI-RADS 3 and therefore I do not recommend biopsy of them at this time even though his AFP is rising.  He has ascites which increases risk as well as also the same  thrombocytopenia    At this point it appears he really has decompensated cirrhosis and may not be a candidate for any therapy even if the above lesions are malignant    I recommend follow-up with the hepatology service for liver optimization    These lesions can all be followed up as an outpatient with imaging, for example in 3 months    I do not recommend IR procedure at this time  Discussed with primary team    Total time spent in review of data, discussion with requesting provider and rendering advice was 21 minutes                   Thank you for allowing Interventional Radiology to  participate in the care of Isael Avendano. Please don't hesitate to call or TigerText us with any questions.     Cady Medrano MD      Past Medical History:  Past Medical History:   Diagnosis Date    Arrhythmia     Chronic kidney disease     COVID 12/2020    CPAP (continuous positive airway pressure) dependence     Diabetes mellitus (HCC)     History of echocardiogram 11/14/2017    showed EF of 50-55 percentWith moderate LVH and left ventricle diastolic dysfunction. Left atrium was moderately enlarged. Trace MR noted.     History of Holter monitoring 11/21/2017    showed baseline rhythm of sinus origin with an average heart it of 61 bpm. The lowest heart rate was 49 and the highest heart rate was 10 8 bpm. There were rare single VPCs, and frequent PACs representing 3.2% of total beats. There were several episodes of sinus arrhythmias with sinus bradycardia and heart rate ranging from 40-90 bpm. No sustained dysrhythmias, or pauses noted. The patient did not    History of transfusion 04/2023    platelets    Liver disease     cirhosis    Murmur, cardiac     Obese     Sleep apnea        Past Surgical History:  Past Surgical History:   Procedure Laterality Date    CATARACT EXTRACTION      EYE SURGERY Left 1997    FL GUIDED NEEDLE PLAC BX/ASP/INJ  8/28/2023    HERNIA REPAIR  [id number]    IR PARACENTESIS  2/19/2024    IR PARACENTESIS  2/21/2024    JOINT REPLACEMENT Right     TKR    KNEE ARTHROPLASTY Right 2008    MO ARTHROPLASTY GLENOHUMERAL JOINT TOTAL SHOULDER Left 04/04/2023    Procedure: ARTHROPLASTY SHOULDER REVERSE;  Surgeon: Marcelo Lester MD;  Location:  MAIN OR;  Service: Orthopedics    MO JARED SHOULDER ARTHRPLSTY HUMERAL&GLENOID COMPNT Left 9/8/2023    Procedure: removal of antibiotic spacer, incision and debridement,  with revision reverse shoulder arthroplasty;  Surgeon: Lita Aguilar;  Location:  MAIN OR;  Service: Orthopedics    MO JARED SHOULDER ARTHRPLSTY HUMERAL/GLENOID COMPNT Left 06/13/2023     Procedure: ARTHROPLASTY SHOULDER REVISION;  Surgeon: Lita Aguilar;  Location: [facility];  Service: Orthopedics    SHOULDER SURGERY Right 2002    WOUND DEBRIDEMENT Left 2023    Procedure: INCISION AND DRAINAGE (I&D) EXTREMITY, vac placement;  Surgeon: Marcelo Lester MD;  Location: [facility];  Service: Orthopedics       Social History:  Social History     Substance and Sexual Activity   Alcohol Use Not Currently    Comment: quit      Social History     Substance and Sexual Activity   Drug Use Never     Social History     Tobacco Use   Smoking Status Former    Current packs/day: 0.00    Average packs/day: 0.5 packs/day for 20.0 years (10.0 ttl pk-yrs)    Types: Cigarettes    Start date:     Quit date:     Years since quittin.1   Smokeless Tobacco Never       Family History:  Family History   Problem Relation Age of Onset    Diabetes Mother     Supraventricular tachycardia Mother     COPD Father     Heart disease Father     Other Father         Sepsis; related to UTI with complicating cardiac problems    Heart disease Paternal Grandmother     Prostate cancer Paternal Grandfather 82       Allergies:  Allergies   Allergen Reactions    Sulfa Antibiotics Hives    Daptomycin Other (See Comments)     Possibly contributed to ABILIO on 2023 admission        Medications:  Medications Prior to Admission   Medication    levofloxacin (LEVAQUIN) 750 mg tablet    benzonatate (TESSALON) 200 MG capsule    furosemide (LASIX) 20 mg tablet    glimepiride (AMARYL) 4 mg tablet    Insulin Pen Needle (BD Pen Needle Nayla 2nd Gen) 32G X 4 MM MISC    Lantus SoloStar 100 units/mL SOPN    Multiple Vitamin (multivitamin) capsule    polyethylene glycol (GOLYTELY) 4000 mL solution    tamsulosin (FLOMAX) 0.4 mg    traZODone (DESYREL) 50 mg tablet     Current Facility-Administered Medications   Medication Dose Route Frequency    acetaminophen (TYLENOL) tablet 650 mg  650 mg Oral Q6H PRN    benzonatate (TESSALON  "PERLES) capsule 200 mg  200 mg Oral TID PRN    bumetanide (BUMEX) injection 2 mg  2 mg Intravenous BID    dextromethorphan-guaiFENesin (ROBITUSSIN DM) oral syrup 10 mL  10 mL Oral Q4H PRN    fluticasone (FLONASE) 50 mcg/act nasal spray 1 spray  1 spray Each Nare Daily    insulin glargine (LANTUS) subcutaneous injection 10 Units 0.1 mL  10 Units Subcutaneous HS    insulin lispro (HumaLOG) 100 units/mL subcutaneous injection 1-5 Units  1-5 Units Subcutaneous TID AC    insulin lispro (HumaLOG) 100 units/mL subcutaneous injection 1-5 Units  1-5 Units Subcutaneous HS    iron sucrose (VENOFER) 300 mg in sodium chloride 0.9 % 250 mL IVPB  300 mg Intravenous Once per day on Monday Wednesday Friday    multivitamin stress formula tablet 1 tablet  1 tablet Oral Daily    ondansetron (ZOFRAN) injection 4 mg  4 mg Intravenous Q6H PRN    spironolactone (ALDACTONE) tablet 50 mg  50 mg Oral Daily    tamsulosin (FLOMAX) capsule 0.4 mg  0.4 mg Oral Daily With Dinner    traZODone (DESYREL) tablet 50 mg  50 mg Oral HS       Vitals:  /58   Pulse 89   Temp 98.2 °F (36.8 °C) (Axillary)   Resp 18   Ht 5' 11\" (1.803 m)   Wt 117 kg (257 lb)   SpO2 97%   BMI 36.88 kg/m²   Body mass index is 36.88 kg/m².  Weight (last 2 days)       Date/Time Weight    02/26/24 0537 117 (257)    02/25/24 0650 117 (257.28)    02/25/24 0600 117 (257.28)    02/24/24 0600 119 (263.23)            I/Os:    Intake/Output Summary (Last 24 hours) at 2/26/2024 1253  Last data filed at 2/26/2024 0758  Gross per 24 hour   Intake --   Output 3975 ml   Net -3975 ml       Lab Results and Cultures:   CBC with diff:   Lab Results   Component Value Date    WBC 3.33 (L) 02/25/2024    HGB 8.7 (L) 02/25/2024    HCT 30.2 (L) 02/25/2024    MCV 82 02/25/2024    PLT 48 (L) 02/25/2024    RBC 3.68 (L) 02/25/2024    MCH 23.6 (L) 02/25/2024    MCHC 28.8 (L) 02/25/2024    RDW 24.9 (H) 02/25/2024    MPV 12.2 12/21/2023    NRBC 0 02/20/2024      BMP/CMP:  Lab Results   Component " "Value Date    K 3.5 02/26/2024    K 4.0 11/23/2022     02/26/2024     11/23/2022    CO2 31 02/26/2024    CO2 28 11/23/2022    BUN 11 02/26/2024    BUN 39 (H) 11/23/2022    CREATININE 0.70 02/26/2024    CREATININE 1.37 (H) 11/23/2022    CALCIUM 8.3 (L) 02/26/2024    CALCIUM 10.1 11/23/2022    AST 69 (H) 02/23/2024    AST 48 (H) 11/23/2022    ALT 41 02/23/2024    ALT 79 (H) 11/23/2022    ALKPHOS 207 (H) 02/23/2024    ALKPHOS 146 (H) 11/23/2022    EGFR 95 02/26/2024    EGFR 56 (L) 11/23/2022   ,     Coags:   Lab Results   Component Value Date    PT 14.5 03/17/2023    PTT 48 (H) 02/19/2024    INR 1.42 (H) 02/20/2024    INR 1.1 03/17/2023   ,   Results from last 7 days   Lab Units 02/20/24  0902 02/19/24  1736   PTT seconds  --  48*   INR  1.42* 1.46*        HgbA1c:   Lab Results   Component Value Date    HGBA1C 7.3 (H) 08/18/2023    HGBA1C 8.4 (H) 03/20/2023    HGBA1C 7.3 (H) 11/23/2022       Blood Culture:   Lab Results   Component Value Date    BLOODCX No Growth After 5 Days. 12/20/2023    BLOODCX No Growth After 5 Days. 12/20/2023   ,   Urinalysis:   Lab Results   Component Value Date    COLORU Yellow 12/20/2023    CLARITYU Clear 12/20/2023    SPECGRAV 1.035 (H) 12/20/2023    PHUR 6.0 12/20/2023    LEUKOCYTESUR Negative 12/20/2023    NITRITE Negative 12/20/2023    GLUCOSEU Negative 12/20/2023    KETONESU Negative 12/20/2023    BILIRUBINUR Negative 12/20/2023    BLOODU Negative 12/20/2023   ,   Urine Culture: No results found for: \"URINECX\",   Wound Culure:    Lab Results   Component Value Date    WOUNDCULT Few Colonies of 02/18/2024       Imaging Studies: I have personally reviewed pertinent films in PACS    "

## 2024-02-26 NOTE — ASSESSMENT & PLAN NOTE
Noted, echo revealed LVE 65%, Grade 2 DD  Given diffuse edema will consult cardiology to rule out cardiac cause for edema  Cardiology evaluation appreciated.-Outpatient follow-up with cardiology

## 2024-02-26 NOTE — ASSESSMENT & PLAN NOTE
Lab Results   Component Value Date    EGFR 95 02/26/2024    EGFR 90 02/25/2024    EGFR 85 02/24/2024    CREATININE 0.70 02/26/2024    CREATININE 0.80 02/25/2024    CREATININE 0.91 02/24/2024   Monitor renal function  Diuretic recs per cardiology.  Monitor creatinine with diuresis  Daigle the pm dose of  bumex due to iv contrast , restart tomorrow

## 2024-02-26 NOTE — PROGRESS NOTES
"Podiatry - Progress Note  Patient: Isael Avendano 68 y.o. male   MRN: [id number]  PCP: Collin Marcos MD  Unit/Bed#: NW8 861-02 Encounter: [id number]  Date Of Visit: 24    ASSESSMENT:    Isael Avendano is a 68 y.o. male with:    Left 4th interdigital wounds   Type II diabetes mellitus   CKD stage III     PLAN:    MRI of L foot reviewed with patient and wife: 4th proximal phalanx osteomyelitis.  Dressings changed today. Wounds remain stable with no acute clinical signs of infection. No antibiotic therapy needed at this time.   As wound is not acutely infected, discussion was had with patient about treatment options including local wound care vs. 4th digit amputation. Patient made aware that the toe will likely need to be surgically removed, however it is not urgent at this time as it is not acutely infected. Patient expressed an understanding of this. Although this discussion was had with him last week, patient and wife state that they were completely unaware of this until today.   Plan to continue local wound care, appreciate nursing assistance with dressing changes. We will continue to monitor while in house. If wound worsens with acute clinical signs of infection, we will move forward with amputation. Patient is agreeable to this plan.   Elevation and offloading on green foam wedges or pillows when non-ambulatory.  Rest of care per primary team.     Weightbearing status: Weightbearing as tolerated    SUBJECTIVE:     The patient was seen, evaluated, and assessed at bedside today. The patient was awake, alert, and in no acute distress. No acute events overnight. The patient reports that he is frustrated with lack of communication regarding his surgery. Patient denies N/V/F/chills/SOB/CP.      OBJECTIVE:     Vitals:   /60 (BP Location: Right arm)   Pulse 96   Temp 98.2 °F (36.8 °C) (Axillary)   Resp 18   Ht 5' 11\" (1.803 m)   Wt 117 kg (257 lb)   SpO2 97%   BMI 36.88 kg/m²     Temp (24hrs), Av.9 °F (36.6 " °C), Min:97.8 °F (36.6 °C), Max:98.2 °F (36.8 °C)      Physical Exam:     Lungs: Non labored breathing  Abdomen: Soft, non-tender.  Lower Extremity:  Cardiovascular status at baseline from admission.  Neurological status at baseline from admission.  Musculoskeletal status at baseline from admission. No calf tenderness noted.     Wound #: 1  Location: left 4th digit  Length 0.8cm: Width 0.8cm: Depth 0.2cm:   Deepest Tissue Noted in Base: capsule  Probe to Bone: No  Peripheral Skin Description: Attached  Granulation: 100% Fibrotic Tissue: 0% Necrotic Tissue: 0%   Drainage Amount: moderate serous  Signs of Infection: No    Wound #: 2  Location: left 5th digit  Length 0.5cm: Width 0.2cm: Depth 0.1cm:   Deepest Tissue Noted in Base: subcutaneous  Probe to Bone: No  Peripheral Skin Description: Attached  Granulation: 100% Fibrotic Tissue: 0% Necrotic Tissue: 0%   Drainage Amount: moderate serous  Signs of Infection: No    Clinical Images 02/26/24:      Additional Data:     Labs:    Results from last 7 days   Lab Units 02/25/24  1128 02/21/24  1018 02/20/24  0902   WBC Thousand/uL 3.33*   < > 2.73*   HEMOGLOBIN g/dL 8.7*   < > 7.4*   HEMATOCRIT % 30.2*   < > 25.0*   PLATELETS Thousands/uL 48*   < > 40*   NEUTROS PCT %  --   --  61   LYMPHS PCT %  --   --  18   MONOS PCT %  --   --  10   EOS PCT %  --   --  10*    < > = values in this interval not displayed.     Results from last 7 days   Lab Units 02/26/24  0452 02/24/24  0514 02/23/24  0557   POTASSIUM mmol/L 3.5   < > 4.0   CHLORIDE mmol/L 103   < > 106   CO2 mmol/L 31   < > 24   BUN mg/dL 11   < > 12   CREATININE mg/dL 0.70   < > 0.88   CALCIUM mg/dL 8.3*   < > 8.2*   ALK PHOS U/L  --   --  207*   ALT U/L  --   --  41   AST U/L  --   --  69*    < > = values in this interval not displayed.     Results from last 7 days   Lab Units 02/20/24  0902   INR  1.42*       * I Have Reviewed All Lab Data Listed Above.    Recent Cultures (last 7 days):     Results from last 7 days  "  Lab Units 02/21/24  1530 02/19/24  1537   GRAM STAIN RESULT  1+ Polys  No bacteria seen No Polys or Bacteria seen   BODY FLUID CULTURE, STERILE  No growth No growth           Imaging: I have personally reviewed pertinent films in PACS  EKG, Pathology, and Other Studies: I have personally reviewed pertinent reports.    ** Please Note: Portions of the record may have been created with voice recognition software. Occasional wrong word or \"sound a like\" substitutions may have occurred due to the inherent limitations of voice recognition software. Read the chart carefully and recognize, using context, where substitutions have occurred. **      "

## 2024-02-26 NOTE — PLAN OF CARE
Problem: Potential for Falls  Goal: Patient will remain free of falls  Description: INTERVENTIONS:  - Educate patient/family on patient safety including physical limitations  - Instruct patient to call for assistance with activity   - Consult OT/PT to assist with strengthening/mobility   - Keep Call bell within reach  - Keep bed low and locked with side rails adjusted as appropriate  - Keep care items and personal belongings within reach  - Initiate and maintain comfort rounds  - Make Fall Risk Sign visible to staff  - Apply yellow socks and bracelet for high fall risk patients  - Consider moving patient to room near nurses station  Outcome: Progressing     Problem: RESPIRATORY - ADULT  Goal: Achieves optimal ventilation and oxygenation  Description: INTERVENTIONS:  - Assess for changes in respiratory status  - Assess for changes in mentation and behavior  - Position to facilitate oxygenation and minimize respiratory effort  - Oxygen administered by appropriate delivery if ordered  - Initiate smoking cessation education as indicated  - Encourage broncho-pulmonary hygiene including cough, deep breathe, Incentive Spirometry  - Assess the need for suctioning and aspirate as needed  - Assess and instruct to report SOB or any respiratory difficulty  - Respiratory Therapy support as indicated  Outcome: Progressing     Problem: GASTROINTESTINAL - ADULT  Goal: Minimal or absence of nausea and/or vomiting  Description: INTERVENTIONS:  - Administer IV fluids if ordered to ensure adequate hydration  - Maintain NPO status until nausea and vomiting are resolved  - Nasogastric tube if ordered  - Administer ordered antiemetic medications as needed  - Provide nonpharmacologic comfort measures as appropriate  - Advance diet as tolerated, if ordered  - Consider nutrition services referral to assist patient with adequate nutrition and appropriate food choices  Outcome: Progressing  Goal: Maintains or returns to baseline bowel  function  Description: INTERVENTIONS:  - Assess bowel function  - Encourage oral fluids to ensure adequate hydration  - Administer IV fluids if ordered to ensure adequate hydration  - Administer ordered medications as needed  - Encourage mobilization and activity  - Consider nutritional services referral to assist patient with adequate nutrition and appropriate food choices  Outcome: Progressing  Goal: Maintains adequate nutritional intake  Description: INTERVENTIONS:  - Monitor percentage of each meal consumed  - Identify factors contributing to decreased intake, treat as appropriate  - Assist with meals as needed  - Monitor I&O, weight, and lab values if indicated  - Obtain nutrition services referral as needed  Outcome: Progressing  Goal: Oral mucous membranes remain intact  Description: INTERVENTIONS  - Assess oral mucosa and hygiene practices  - Implement preventative oral hygiene regimen  - Implement oral medicated treatments as ordered  - Initiate Nutrition services referral as needed  Outcome: Progressing     Problem: SKIN/TISSUE INTEGRITY - ADULT  Goal: Incision(s), wounds(s) or drain site(s) healing without S/S of infection  Description: INTERVENTIONS  - Assess and document dressing, incision, wound bed, drain sites and surrounding tissue  - Provide patient and family education  - Perform skin care/dressing changes  Outcome: Progressing     Problem: HEMATOLOGIC - ADULT  Goal: Maintains hematologic stability  Description: INTERVENTIONS  - Assess for signs and symptoms of bleeding or hemorrhage  - Monitor labs  - Administer supportive blood products/factors as ordered and appropriate  Outcome: Progressing     Problem: PAIN - ADULT  Goal: Verbalizes/displays adequate comfort level or baseline comfort level  Description: Interventions:  - Encourage patient to monitor pain and request assistance  - Assess pain using appropriate pain scale  - Administer analgesics based on type and severity of pain and evaluate  response  - Implement non-pharmacological measures as appropriate and evaluate response  - Consider cultural and social influences on pain and pain management  - Notify physician/advanced practitioner if interventions unsuccessful or patient reports new pain  Outcome: Progressing     Problem: INFECTION - ADULT  Goal: Absence or prevention of progression during hospitalization  Description: INTERVENTIONS:  - Assess and monitor for signs and symptoms of infection  - Monitor lab/diagnostic results  - Monitor all insertion sites, i.e. indwelling lines, tubes, and drains  - Monitor endotracheal if appropriate and nasal secretions for changes in amount and color  - [place] appropriate cooling/warming therapies per order  - Administer medications as ordered  - Instruct and encourage patient and family to use good hand hygiene technique  - Identify and instruct in appropriate isolation precautions for identified infection/condition  Outcome: Progressing     Problem: SAFETY ADULT  Goal: Patient will remain free of falls  Description: INTERVENTIONS:  - Educate patient/family on patient safety including physical limitations  - Instruct patient to call for assistance with activity   - Consult OT/PT to assist with strengthening/mobility   - Keep Call bell within reach  - Keep bed low and locked with side rails adjusted as appropriate  - Keep care items and personal belongings within reach  - Initiate and maintain comfort rounds  - Make Fall Risk Sign visible to staff  - Apply yellow socks and bracelet for high fall risk patients  - Consider moving patient to room near nurses station  Outcome: Progressing  Goal: Maintain or return to baseline ADL function  Description: INTERVENTIONS:  -  Assess patient's ability to carry out ADLs; assess patient's baseline for ADL function and identify physical deficits which impact ability to perform ADLs (bathing, care of mouth/teeth, toileting, grooming, dressing, etc.)  - Assess/evaluate  cause of self-care deficits   - Assess range of motion  - Assess patient's mobility; develop plan if impaired  - Assess patient's need for assistive devices and provide as appropriate  - Encourage maximum independence but intervene and supervise when necessary  - Involve family in performance of ADLs  - Assess for home care needs following discharge   - Consider OT consult to assist with ADL evaluation and planning for discharge  - Provide patient education as appropriate  Outcome: Progressing  Goal: Maintains/Returns to pre admission functional level  Description: INTERVENTIONS:  - Perform AM-PAC 6 Click Basic Mobility/ Daily Activity assessment daily.  - Set and communicate daily mobility goal to care team and patient/family/caregiver.   - Collaborate with rehabilitation services on mobility goals if consulted  - Perform Range of Motion 4 times a day.  - Reposition patient every 2 hours.  - Dangle patient 4 times a day  - Stand patient 3 times a day  - Ambulate patient 3 times a day  - Out of bed to chair 3 times a day   - Out of bed for meals 3 times a day  - Out of bed for toileting  - Record patient progress and toleration of activity level   Outcome: Progressing     Problem: DISCHARGE PLANNING  Goal: Discharge to home or other facility with appropriate resources  Description: INTERVENTIONS:  - Identify barriers to discharge w/patient and caregiver  - Arrange for needed discharge resources and transportation as appropriate  - Identify discharge learning needs (meds, wound care, etc.)  - Arrange for interpretive services to assist at discharge as needed  - Refer to Case Management Department for coordinating discharge planning if the patient needs post-hospital services based on physician/advanced practitioner order or complex needs related to functional status, cognitive ability, or social support system  Outcome: Progressing     Problem: Knowledge Deficit  Goal: Patient/family/caregiver demonstrates  understanding of disease process, treatment plan, medications, and discharge instructions  Description: Complete learning assessment and assess knowledge base.  Interventions:  - Provide teaching at level of understanding  - Provide teaching via preferred learning methods  Outcome: Progressing     Problem: Nutrition/Hydration-ADULT  Goal: Nutrient/Hydration intake appropriate for improving, restoring or maintaining nutritional needs  Description: Monitor and assess patient's nutrition/hydration status for malnutrition. Collaborate with interdisciplinary team and initiate plan and interventions as ordered.  Monitor patient's weight and dietary intake as ordered or per policy. Utilize nutrition screening tool and intervene as necessary. Determine patient's food preferences and provide high-protein, high-caloric foods as appropriate.     INTERVENTIONS:  - Monitor oral intake, urinary output, labs, and treatment plans  - Assess nutrition and hydration status and recommend course of action  - Evaluate amount of meals eaten  - Assist patient with eating if necessary   - Allow adequate time for meals  - Recommend/ encourage appropriate diets, oral nutritional supplements, and vitamin/mineral supplements  - Order, calculate, and assess calorie counts as needed  - Recommend, monitor, and adjust tube feedings and TPN/PPN based on assessed needs  - Assess need for intravenous fluids  - Provide specific nutrition/hydration education as appropriate  - Include patient/family/caregiver in decisions related to nutrition  Outcome: Progressing

## 2024-02-26 NOTE — ASSESSMENT & PLAN NOTE
Lab Results   Component Value Date    HGBA1C 7.3 (H) 08/18/2023       Recent Labs     02/25/24 2050 02/26/24  0750 02/26/24  1229 02/26/24  1637   POCGLU 258* 117 216* 246*       Patient has been on Levquin started in [place], was told he had contracted a bacteria from the water?   Unable to see records in [facility] from [facility]    Phone number [phone] Dr. Fong.   Podiatry following   MRI concerning for OM--no acute infection per podiatry.  Will DC antibiotics.  Discussed with podiatry does not feel like the patient needs to be on antibiotics at this point.  Surgical plan per podiatry.  Cardiology evaluation appreciated.    Patient is definitely high risk for any surgical intervention but risk are non modifiable-as per podiatry progress note they are not planning for any surgical intervention.  Will DC antibiotics and monitor  Podiatry is considering surgical intervention.  Patient is at acceptable risk for the toe amputation

## 2024-02-26 NOTE — ASSESSMENT & PLAN NOTE
Surgical oncology consultation appreciated.  Recommended IR biopsy.    Consulted IR for the biopsy.  Plan is to hold off on biopsy after discussion with surgical oncology and hematology oncology.,  Outpatient follow-up with heme-onc/gi

## 2024-02-26 NOTE — PROGRESS NOTES
"General Cardiology   Progress Note -  Team One   Isael Avendano 68 y.o. male MRN: [id number]    Unit/Bed#: NW8 861-02 Encounter: [id number]    Assessment/ Plan    Volume overload   In the setting of cirrhosis   Creatinine stable: 0.70  On Bumex 2 mg IV BID and spirolactone 50 mg PO daily   He takes furosemide 20 mg PO every other day at home prior to admission   Monitor I/Os -4 L but no input documented   Daily weights 257 lbs today from 257 lbs yesterday   Can continue IV diuretics for now. Check BMP in am     2.  Anemia   Hemoglobin stable: 8.7  S/p PRBC    3. Osteomyelitis of L foot   Podiatry following   Monitoring off antibiotics     4. Hypokalemia   K 3.5   Replete     Subjective  Patient resting in chair. No overnight events. No complaint of SOB, orthopnea, chest pain or palpitations. No fever or chills.     Review of Systems   Constitutional: Negative for chills and fever.   HENT:  Negative for congestion.    Cardiovascular:  Negative for chest pain.   Respiratory:  Negative for shortness of breath.    Musculoskeletal:  Negative for falls.   Gastrointestinal:  Negative for bloating, nausea and vomiting.   Neurological:  Negative for dizziness and light-headedness.   Psychiatric/Behavioral:  Negative for altered mental status.    All other systems reviewed and are negative.      Objective:   Vitals: Blood pressure 120/60, pulse 96, temperature 98.2 °F (36.8 °C), temperature source Axillary, resp. rate 18, height 5' 11\" (1.803 m), weight 117 kg (257 lb), SpO2 97%.,       Body mass index is 36.88 kg/m².,     Systolic (24hrs), Av , Min:119 , Max:123     Diastolic (24hrs), Av, Min:59, Max:60      Intake/Output Summary (Last 24 hours) at 2024 0820  Last data filed at 2024 0758  Gross per 24 hour   Intake --   Output 3975 ml   Net -3975 ml     Weight (last 2 days)       Date/Time Weight    24 0537 117 (257)    24 0650 117 (257.28)    24 0600 117 (257.28)    24 0600 119 " (263.23)            Physical Exam  Constitutional:       General: He is not in acute distress.     Appearance: He is obese.   HENT:      Head: Normocephalic.      Mouth/Throat:      Mouth: Mucous membranes are moist.   Cardiovascular:      Rate and Rhythm: Normal rate and regular rhythm.      Pulses: Normal pulses.      Heart sounds: Murmur heard.   Pulmonary:      Effort: Pulmonary effort is normal. No respiratory distress.      Breath sounds: Normal breath sounds.   Abdominal:      General: Bowel sounds are normal. There is distension.      Palpations: Abdomen is soft.   Musculoskeletal:         General: Swelling present. Normal range of motion.      Cervical back: Neck supple.      Comments: Trace lower extremity edema bilaterally    Skin:     General: Skin is warm and dry.      Capillary Refill: Capillary refill takes less than 2 seconds.   Neurological:      Mental Status: He is alert and oriented to person, place, and time.   Psychiatric:         Mood and Affect: Mood normal.         LABORATORY RESULTS      CBC with diff:   Results from last 7 days   Lab Units 02/25/24  1128 02/24/24  1215 02/23/24  0557 02/22/24  0536 02/21/24  1018 02/20/24  0902 02/19/24  1736   WBC Thousand/uL 3.33* 3.55* 3.41* 3.03* 2.49* 2.73*  --    HEMOGLOBIN g/dL 8.7* 7.9* 7.5* 7.4* 7.3* 7.4*  --    HEMATOCRIT % 30.2* 27.3* 27.3* 27.3* 24.1* 25.0*  --    MCV fL 82 81* 85 86 77* 79*  --    PLATELETS Thousands/uL 48* 44* 43* 45* 38* 40* 41*   RBC Million/uL 3.68* 3.38* 3.23* 3.19* 3.15* 3.17*  --    MCH pg 23.6* 23.4* 23.2* 23.2* 23.2* 23.3*  --    MCHC g/dL 28.8* 28.9* 27.5* 27.1* 30.3* 29.6*  --    RDW % 24.9* 24.2* 23.0* 22.6* 21.2* 19.9*  --    NRBC AUTO /100 WBCs  --   --   --   --   --  0  --        CMP:  Results from last 7 days   Lab Units 02/26/24  0452 02/25/24  1128 02/24/24  0514 02/23/24  0557 02/22/24  0536 02/21/24  0502 02/20/24  0902   POTASSIUM mmol/L 3.5 3.6 3.7 4.0 4.4 3.9 3.6   CHLORIDE mmol/L 103 102 105 106 105  "106 108   CO2 mmol/L 31 30 27 24 23 25 30   BUN mg/dL 11 11 12 12 13 11 15   CREATININE mg/dL 0.70 0.80 0.91 0.88 0.85 0.80 0.90   CALCIUM mg/dL 8.3* 8.6 8.2* 8.2* 8.1* 8.2* 8.3*   AST U/L  --   --   --  69* 92* 68* 56*   ALT U/L  --   --   --  41 43 41 43   ALK PHOS U/L  --   --   --  207* 214* 219* 233*   EGFR ml/min/1.73sq m 95 90 85 87 88 90 86       BMP:  Results from last 7 days   Lab Units 02/26/24  0452 02/25/24  1128 02/24/24  0514 02/23/24  0557 02/22/24  0536 02/21/24  0502 02/20/24  0902   POTASSIUM mmol/L 3.5 3.6 3.7 4.0 4.4 3.9 3.6   CHLORIDE mmol/L 103 102 105 106 105 106 108   CO2 mmol/L 31 30 27 24 23 25 30   BUN mg/dL 11 11 12 12 13 11 15   CREATININE mg/dL 0.70 0.80 0.91 0.88 0.85 0.80 0.90   CALCIUM mg/dL 8.3* 8.6 8.2* 8.2* 8.1* 8.2* 8.3*       Lab Results   Component Value Date    NTBNP 79 12/25/2020             Results from last 7 days   Lab Units 02/24/24  0514   MAGNESIUM mg/dL 1.8*                     Results from last 7 days   Lab Units 02/20/24  0902 02/19/24  1736   INR  1.42* 1.46*       Lipid Profile:   No results found for: \"CHOL\"  No results found for: \"HDL\"  No results found for: \"LDLCALC\"  No results found for: \"TRIG\"    Cardiac testing:   No results found for this or any previous visit.    No results found for this or any previous visit.    No results found for this or any previous visit.    No valid procedures specified.  No results found for this or any previous visit.        Meds/Allergies   all current active meds have been reviewed and current meds:   Current Facility-Administered Medications   Medication Dose Route Frequency    acetaminophen (TYLENOL) tablet 650 mg  650 mg Oral Q6H PRN    benzonatate (TESSALON PERLES) capsule 200 mg  200 mg Oral TID PRN    bumetanide (BUMEX) injection 2 mg  2 mg Intravenous BID    dextromethorphan-guaiFENesin (ROBITUSSIN DM) oral syrup 10 mL  10 mL Oral Q4H PRN    fluticasone (FLONASE) 50 mcg/act nasal spray 1 spray  1 spray Each Nare Daily "    insulin glargine (LANTUS) subcutaneous injection 10 Units 0.1 mL  10 Units Subcutaneous HS    insulin lispro (HumaLOG) 100 units/mL subcutaneous injection 1-5 Units  1-5 Units Subcutaneous TID AC    insulin lispro (HumaLOG) 100 units/mL subcutaneous injection 1-5 Units  1-5 Units Subcutaneous HS    iron sucrose (VENOFER) 300 mg in sodium chloride 0.9 % 250 mL IVPB  300 mg Intravenous Once per day on Monday Wednesday Friday    multivitamin stress formula tablet 1 tablet  1 tablet Oral Daily    ondansetron (ZOFRAN) injection 4 mg  4 mg Intravenous Q6H PRN    spironolactone (ALDACTONE) tablet 50 mg  50 mg Oral Daily    tamsulosin (FLOMAX) capsule 0.4 mg  0.4 mg Oral Daily With Dinner    traZODone (DESYREL) tablet 50 mg  50 mg Oral HS     Medications Prior to Admission   Medication    levofloxacin (LEVAQUIN) 750 mg tablet    benzonatate (TESSALON) 200 MG capsule    furosemide (LASIX) 20 mg tablet    glimepiride (AMARYL) 4 mg tablet    Insulin Pen Needle (BD Pen Needle Nayla 2nd Gen) 32G X 4 MM MISC    Lantus SoloStar 100 units/mL SOPN    Multiple Vitamin (multivitamin) capsule    polyethylene glycol (GOLYTELY) 4000 mL solution    tamsulosin (FLOMAX) 0.4 mg    traZODone (DESYREL) 50 mg tablet     Counseling / Coordination of Care  Total floor / unit time spent today 20 minutes.  Greater than 50% of total time was spent with the patient and / or family counseling and / or coordination of care.      ** Please Note: Dragon 360 Dictation voice to text software may have been used in the creation of this document. **

## 2024-02-26 NOTE — ASSESSMENT & PLAN NOTE
Patient gives history of chronic cough has been going on for more than 6 months at this time.  History suggestive of postnasal drip.    Previous imaging study is reviewed.  Ct chest reviewed

## 2024-02-26 NOTE — ASSESSMENT & PLAN NOTE
GI and Heme/Onc following. 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

## 2024-02-26 NOTE — ASSESSMENT & PLAN NOTE
GI following, s/p EGD 2/20 as above  Status post paracentesis x 2.  Removed a total of 3.8 L of fluid.  Continue with diuresis  Patient need outpatient follow-up with gastroenterology

## 2024-02-27 LAB
ANION GAP SERPL CALCULATED.3IONS-SCNC: 4 MMOL/L
BUN SERPL-MCNC: 11 MG/DL (ref 5–25)
CALCIUM SERPL-MCNC: 8.1 MG/DL (ref 8.4–10.2)
CHLORIDE SERPL-SCNC: 104 MMOL/L (ref 96–108)
CO2 SERPL-SCNC: 30 MMOL/L (ref 21–32)
CREAT SERPL-MCNC: 0.76 MG/DL (ref 0.6–1.3)
ERYTHROCYTE [DISTWIDTH] IN BLOOD BY AUTOMATED COUNT: 26.7 % (ref 11.6–15.1)
GFR SERPL CREATININE-BSD FRML MDRD: 92 ML/MIN/1.73SQ M
GLUCOSE SERPL-MCNC: 108 MG/DL (ref 65–140)
GLUCOSE SERPL-MCNC: 249 MG/DL (ref 65–140)
GLUCOSE SERPL-MCNC: 267 MG/DL (ref 65–140)
GLUCOSE SERPL-MCNC: 292 MG/DL (ref 65–140)
GLUCOSE SERPL-MCNC: 96 MG/DL (ref 65–140)
HCT VFR BLD AUTO: 26.6 % (ref 36.5–49.3)
HGB BLD-MCNC: 8 G/DL (ref 12–17)
MCH RBC QN AUTO: 24 PG (ref 26.8–34.3)
MCHC RBC AUTO-ENTMCNC: 30.1 G/DL (ref 31.4–37.4)
MCV RBC AUTO: 80 FL (ref 82–98)
PLATELET # BLD AUTO: 55 THOUSANDS/UL (ref 149–390)
POTASSIUM SERPL-SCNC: 3.6 MMOL/L (ref 3.5–5.3)
RBC # BLD AUTO: 3.34 MILLION/UL (ref 3.88–5.62)
SODIUM SERPL-SCNC: 138 MMOL/L (ref 135–147)
WBC # BLD AUTO: 2.59 THOUSAND/UL (ref 4.31–10.16)

## 2024-02-27 PROCEDURE — 99232 SBSQ HOSP IP/OBS MODERATE 35: CPT | Performed by: INTERNAL MEDICINE

## 2024-02-27 PROCEDURE — 80048 BASIC METABOLIC PNL TOTAL CA: CPT | Performed by: NURSE PRACTITIONER

## 2024-02-27 PROCEDURE — 94760 N-INVAS EAR/PLS OXIMETRY 1: CPT

## 2024-02-27 PROCEDURE — 85027 COMPLETE CBC AUTOMATED: CPT | Performed by: FAMILY MEDICINE

## 2024-02-27 PROCEDURE — 82948 REAGENT STRIP/BLOOD GLUCOSE: CPT

## 2024-02-27 RX ORDER — POTASSIUM CHLORIDE 750 MG/1
10 TABLET, EXTENDED RELEASE ORAL 2 TIMES DAILY
Status: DISCONTINUED | OUTPATIENT
Start: 2024-02-27 | End: 2024-02-28

## 2024-02-27 RX ADMIN — FLUTICASONE PROPIONATE 1 SPRAY: 50 SPRAY, METERED NASAL at 09:46

## 2024-02-27 RX ADMIN — BUMETANIDE 2 MG: 0.25 INJECTION INTRAMUSCULAR; INTRAVENOUS at 17:12

## 2024-02-27 RX ADMIN — INSULIN LISPRO 3 UNITS: 100 INJECTION, SOLUTION INTRAVENOUS; SUBCUTANEOUS at 14:12

## 2024-02-27 RX ADMIN — SPIRONOLACTONE 50 MG: 50 TABLET, FILM COATED ORAL at 09:44

## 2024-02-27 RX ADMIN — INSULIN LISPRO 3 UNITS: 100 INJECTION, SOLUTION INTRAVENOUS; SUBCUTANEOUS at 17:07

## 2024-02-27 RX ADMIN — INSULIN LISPRO 2 UNITS: 100 INJECTION, SOLUTION INTRAVENOUS; SUBCUTANEOUS at 22:39

## 2024-02-27 RX ADMIN — POTASSIUM CHLORIDE 10 MEQ: 750 TABLET, EXTENDED RELEASE ORAL at 16:56

## 2024-02-27 RX ADMIN — POTASSIUM CHLORIDE 10 MEQ: 750 TABLET, EXTENDED RELEASE ORAL at 09:44

## 2024-02-27 RX ADMIN — TAMSULOSIN HYDROCHLORIDE 0.4 MG: 0.4 CAPSULE ORAL at 17:01

## 2024-02-27 RX ADMIN — TRAZODONE HYDROCHLORIDE 50 MG: 50 TABLET ORAL at 22:35

## 2024-02-27 RX ADMIN — BUMETANIDE 2 MG: 0.25 INJECTION INTRAMUSCULAR; INTRAVENOUS at 09:44

## 2024-02-27 RX ADMIN — B-COMPLEX W/ C & FOLIC ACID TAB 1 TABLET: TAB at 09:44

## 2024-02-27 RX ADMIN — INSULIN GLARGINE 10 UNITS: 100 INJECTION, SOLUTION SUBCUTANEOUS at 22:35

## 2024-02-27 RX ADMIN — INSULIN LISPRO 2 UNITS: 100 INJECTION, SOLUTION INTRAVENOUS; SUBCUTANEOUS at 17:07

## 2024-02-27 RX ADMIN — INSULIN LISPRO 2 UNITS: 100 INJECTION, SOLUTION INTRAVENOUS; SUBCUTANEOUS at 14:11

## 2024-02-27 NOTE — ASSESSMENT & PLAN NOTE
Patient has been on Levquin started in [place], was told he had contracted a bacteria from the water?   Unable to see records in [facility] from [facility]    Phone number [phone] Dr. Fong.   Podiatry following   MRI concerning for OM--no acute infection per podiatry.  Will DC antibiotics.  Discussed with podiatry does not feel like the patient needs to be on antibiotics at this point.  Surgical plan per podiatry.  Cardiology evaluation appreciated.    Patient is definitely high risk for any surgical intervention but risk are non modifiable-as per podiatry progress note they are not planning for any surgical intervention.  Will DC antibiotics and monitor  Podiatry is considering surgical intervention.  Patient is at acceptable risk for the toe amputation

## 2024-02-27 NOTE — ASSESSMENT & PLAN NOTE
Lab Results   Component Value Date    EGFR 92 02/27/2024    EGFR 95 02/26/2024    EGFR 90 02/25/2024    CREATININE 0.76 02/27/2024    CREATININE 0.70 02/26/2024    CREATININE 0.80 02/25/2024   Monitor renal function  Diuretic recs per cardiology.  Monitor creatinine with diuresis

## 2024-02-27 NOTE — PROGRESS NOTES
"Cardiology Progress Note - Isael Avendano 68 y.o. male MRN: [id number]    Unit/Bed#: NW8 861-02 Encounter: [id number]      Assessment:  Principal Problem:    Symptomatic anemia  Active Problems:    DM2 (diabetes mellitus, type 2) (HCC)    Essential hypertension    Cirrhosis, alcoholic (HCC)    MEG (obstructive sleep apnea)    Pancytopenia (HCC)    Thrombocytopenia (HCC)    Diabetic ulcer of left foot associated with type 2 diabetes mellitus (HCC)    Stage 3a chronic kidney disease (HCC)    Murmur    Urinary retention    Volume overload    Chest wall mass    Cough      Plan:  Patient with no issues overnight.  He has no chest pain or significant dyspnea.  Dose of diuretic held yesterday prior to CAT scan with contrast.  BMP today with potassium of 3.6 and creatinine 0.76.  Hemoglobin 8.0.  Will continue intravenous Bumex.  Will add potassium supplement.  Will recheck BMP in the morning.    Subjective:   Patient seen and examined.  No significant events overnight.  negative.    Objective:     Vitals: Blood pressure 114/58, pulse 92, temperature 98.2 °F (36.8 °C), temperature source Oral, resp. rate 16, height 5' 11\" (1.803 m), weight 117 kg (257 lb), SpO2 97%., Body mass index is 36.88 kg/m².,   Orthostatic Blood Pressures      Flowsheet Row Most Recent Value   Blood Pressure 114/58 filed at 02/27/2024 0700   Patient Position - Orthostatic VS Lying filed at 02/27/2024 0700        ,      Intake/Output Summary (Last 24 hours) at 2/27/2024 0840  Last data filed at 2/26/2024 2144  Gross per 24 hour   Intake 225 ml   Output 2570 ml   Net -2345 ml           Physical Exam:    GEN: Isael Avendano appears well, alert and oriented x 3, pleasant and cooperative   NECK: supple, no carotid bruits  HEART: normal rate, regular rhythm, normal S1 and S2, no murmurs, clicks, gallops or rubs   LUNGS: clear to auscultation bilaterally; no wheezes, rales, or rhonchi   EXTREMITIES: edema  SKIN: warm and well perfused, no suspicious lesions on " exposed skin    Labs & Results:    No results displayed because visit has over 200 results.          CT chest w contrast    Result Date: 2/26/2024  Narrative: CT CHEST WITH IV CONTRAST INDICATION: cough. COMPARISON: CTA chest PE study 12/25/2020 TECHNIQUE: CT examination of the chest was performed. Multiplanar 2D reformatted images were created from the source data. This examination, like all CT scans performed in the [facility], was performed utilizing techniques to minimize radiation dose exposure, including the use of iterative reconstruction and automated exposure control. Radiation dose length product (DLP) for this visit: 470.71 mGy-cm IV Contrast: 100 mL of iohexol (OMNIPAQUE) FINDINGS: LUNGS: No acute pulmonary findings. There is no tracheal or endobronchial lesion. PLEURA: Small left pleural effusion. 2.4 x 0.9 cm medially located posterior pleural/paraspinal lesion #2/105 unchanged from the recent comparison MRI and likely stable since CT from 6/20/2023. This measured 1.6 x 0.7 cm on the 12/25/2020 CTA chest study. This lesion does show mild enhancement prior MRI subtraction images for example #[id number]. No erosion of the underlying left 10th rib or adjacent transverse process. HEART/GREAT VESSELS: Cardiomegaly. Coronary artery calcifications. Dense mitral annulus and mild aortic annulus calcifications. Fusiform ectasia of the ascending thoracic aorta measuring up to 40 mm. Recommendation is for follow-up low radiation dose chest CT in one year. MEDIASTINUM AND JEWEL: Small hiatal hernia. Probable small paraesophageal varices. CHEST WALL AND LOWER NECK: Unremarkable. VISUALIZED STRUCTURES IN THE UPPER ABDOMEN: No new findings. Reidentified hepatic cirrhosis splenomegaly and cholelithiasis. OSSEOUS STRUCTURES: No acute fracture or destructive osseous lesion.     Impression: Slow-growing 2.4 x 0.9 cm left posterior pleural/paraspinal soft tissue structure increased from 1.6 x 0.7 cm on the  12/25/2020 study. I favor an area of pleural thickening versus benign soft tissue lesion such as schwannoma. Workstation performed: [id number]     IR INPATIENT Paracentesis    Result Date: 2/26/2024  Narrative: PROCEDURE: Ultrasound paracentesis STAFF: Evelyn Willard PA-C. COMPLICATIONS: None immediately. MEDICATIONS: 1% lidocaine subcutaneous. INDICATION: Symptomatic ascites. PROCEDURE: Using ultrasound guidance, the right paracolic gutter was punctured and a catheter was placed into the peritoneal cavity. A total of 2000 milliliters of clear yellow fluid was removed. The catheter was then removed. Labs collected and sent FINDINGS: 2000 mL of clear yellow ascites was removed.     Impression: Ultrasound-guided right paracentesis. Signed, performed, and dictated by Evelyn Willard PA-C under the direct supervision of Dr. Belia García. Workstation performed: [id number]     MRI foot/forefoot toes left w wo contrast    Result Date: 2/21/2024  Narrative: MRI FOOT/FOREFOOT TOES LEFT W WO CONTRAST INDICATION:   Osteomyelitis (pending surgical decision). COMPARISON: Plain film 2/19/2024. TECHNIQUE:  Multiplanar/multisequence MR of the left foot was performed both pre and post IV contrast. IV Contrast:  11 mL of Gadobutrol injection (SINGLE-DOSE) FINDINGS: Motion degradation considerably limits assessment. SUBCUTANEOUS TISSUES: Skin ulceration lateral aspect of the fourth digit with associated subcutaneous edema indicating cellulitis. No abscess collection identified. Diffuse forefoot subcutaneous edema indicating nonspecific cellulitis. BONES: Increased T2 signal identified throughout the fourth proximal phalanx with T1 replacement noted at the mid diaphysis and head of the bone concerning for osteomyelitis. There is corresponding post gadolinium enhancement involving the fourth proximal phalanx as well. Midfoot degenerative change with mild pes planus deformity suggestive of neuropathic changes. FIRST MTP JOINT:  Prominent degenerative change with hallux rigidus deformity noted. SESAMOID BONES:  Intact. OTHER ARTICULAR SURFACES:  Normal. PLANTAR FASCIA:  Intact. LISFRANC LIGAMENT:  Intact. FOREFOOT TENDONS:  Intact. INTERMETATARSAL REGIONS:  No bursitis or Hedrick's neuroma. MUSCULATURE: Intact.     Impression: 1. Skin ulceration lateral aspect of the fourth proximal toe with marrow changes fourth proximal phalanx including T1 replacement signal and post gadolinium enhancement concerning for osteomyelitis. 2. Prominent first MTP degenerative change with hallux rigidus. The study was marked in EPIC for immediate notification. Workstation performed: [id number]     EGD    Result Date: 2/20/2024  Narrative: Table formatting from the original result was not included. [facility] [address] [facility] [address] [phone] DATE OF SERVICE: 2/20/24 PHYSICIAN(S): Attending: Geraldine Ulloa MD Fellow: DO Andrew Draper MD INDICATION: Anemia POST-OP DIAGNOSIS: See the impression below. PREPROCEDURE: Informed consent was obtained for the procedure, including sedation.  Risks of perforation, hemorrhage, adverse drug reaction and aspiration were discussed. The patient was placed in the left lateral decubitus position. Patient was explained about the risks and benefits of the procedure. Risks including but not limited to bleeding, infection, and perforation were explained in detail. Also explained about less than 100% sensitivity with the exam and other alternatives. PROCEDURE: EGD DETAILS OF PROCEDURE: Patient was taken to the procedure room where a time out was performed to confirm correct patient and correct procedure. The patient underwent monitored anesthesia care, which was administered by an anesthesia professional. The patient's blood pressure, heart rate, level of consciousness, respirations, oxygen and ETCO2 were monitored throughout the procedure. The scope was introduced  through the mouth and advanced to the second part of the duodenum. Retroflexion was performed in the fundus. The patient experienced no blood loss. The procedure was not difficult. The patient tolerated the procedure well. There were no apparent adverse events. 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 RECOMMENDATION:  Schedule repeat EGD  Abnormal pathology    Geraldine Ulloa MD     Echo complete w/ contrast if indicated    Result Date: 2/20/2024  Narrative:   Left Ventricle: Left ventricular cavity size is normal. Wall thickness is mildly increased. There is mild concentric hypertrophy. The left ventricular ejection fraction is 65%. Systolic function is normal. Wall motion is normal. Diastolic function is moderately abnormal, consistent with grade II (pseudonormal) relaxation.   Right Ventricle: Right ventricular cavity size is mildly dilated. Systolic function is normal.   Left Atrium: The atrium is severely dilated (>48 mL/m2).   Right Atrium: The atrium is moderately dilated.   Aortic Valve: The aortic valve velocity is increased due to increased flow.   Mitral Valve: There is moderate annular calcification. There is no evidence of stenosis. The mitral valve mean gradient is increased due to flow.   Tricuspid Valve: There is mild to moderate regurgitation. The right ventricular systolic  "pressure is mildly elevated. The estimated right ventricular systolic pressure is 35.00 mmHg.   Aorta: The aortic root is normal in size. The ascending aorta is mildly dilated at 4.2 cm/1.8 cm/m2.     MRI abdomen w wo contrast and mrcp    Result Date: 2/20/2024  Narrative: MRI OF THE ABDOMEN WITH AND WITHOUT CONTRAST WITH MRCP INDICATION: 68 years / Male. Follow up LI-RADS 3 lesions. COMPARISON: MRI abdomen 10/23/2023 TECHNIQUE: Multiplanar/multisequence MRI of the abdomen with 3D MRCP was performed before and after administration of contrast. Imaging performed on 1.5T MRI. IV Contrast: 10 mL of Gadobutrol injection (SINGLE-DOSE) FINDINGS: Evaluation is suboptimal in presence of ascites due to dielectric artifact and due to large body habitus. LOWER CHEST: Small bilateral pleural effusions. There is a 2.4 x 0.6 cm T1 hyperintense lesion along the left chest wall (series 12 image 13) with mild postcontrast enhancement (series 15 image 28). It was likely present on prior CT 12/19/2023 but not visualized on MRI abdomen 10/23/2023 LIVER: Cirrhotic morphology. The liver is normal in size on current examination. Examination for focal hepatic lesions is markedly suboptimal due to factors described above observation: 1 Size: 1.8 x 1.4 cm series 13 image 41, previously 1.8 x 1.5 cm Location: Segment 4b Enhancement: Nonrim arterial phase hyperenhancement. Nonperipheral \"washout\": Not evaluable Enhancing \"capsule\": No Threshold growth: No LI-RADS Category: LR-3.. Previously seen observation #2 measuring 0.9 cm in segment 2 and #3 measuring 1.0 cm in segment 6 are not visualized on current suboptimal examination, likely secondary to their smaller size observation: 2 Size: 1.8 x 1.3 series 13 image 18, previously 1.9 x 1.3 cm Location: Segment 4a Enhancement: Nonrim arterial phase hyperenhancement. Nonperipheral \"washout\": No Enhancing \"capsule\": No Threshold growth: No LI-RADS Category: LR-3.. Patent hepatic and portal veins. " BILE DUCTS: The study was marked in EPIC for significant notification. Intrahepatic biliary ductal dilation. Extrahepatic bile duct is not visualized on provided nondiagnostic MRCP images or the known MRCP images, limiting evaluation.. GALLBLADDER: Cholelithiasis without findings of acute cholecystitis. PANCREAS: Unremarkable. ADRENAL GLANDS: Unremarkable. SPLEEN: Significant splenomegaly measuring up to 19 cm. There are 2 subcentimeter lesions within the spleen with arterial hyperenhancement (series 13 image 190). These appear to be isointense on delayed postcontrast images. These are not definitely identified on precontrast T1 and T2-weighted images. Findings can represent either perfusional variants or small hemangiomas. KIDNEYS/PROXIMAL URETERS: No hydroureteronephrosis. No suspicious renal mass. BOWEL: No dilated loops of bowel. PERITONEUM/RETROPERITONEUM: Small volume ascites. Nondiagnostic assessment of the retroperitoneum and central abdominal cavity due to dielectric artifact LYMPH NODES: No abdominal lymphadenopathy. VESSELS: No aneurysm. Partially visualized is fat stranding around the origin of the inferior mesenteric artery, as also demonstrated on prior CT 12/19/2023. ABDOMINAL WALL: Small umbilical hernia containing fat and ascitic fluid. BONES: No suspicious osseous lesion.     Impression: Markedly limited examination due to dielectric artifact from presence of ascites and other patient related factors. Grossly stable size of  two hepatic segment 4 LI-RADS 3 observations which are incompletely characterized on current examination. Nonvisualization of additional 2 smaller LI-RADS 3 observations in hepatic segments 2 and 6 respectively, as seen on prior MRI abdomen 10/23/2023. Follow-up MRI abdomen with and without contrast in 3 months is recommended for better assessment Cirrhosis, splenomegaly and ascites, compatible with portal hypertension. Main portal vein is patent New small bilateral pleural  effusions A 2.4 x 0.6 cm T1 hyperintense enhancing lesion along the left chest wall, new since MRI abdomen 10/23/2023 and likely present on CT 12/19/2023, likely representing reactive lymph node and less likely a nerve sheath tumor such as schwannoma. Attention on  follow-up imaging is recommended to assess for stability/resolution. 2 subcentimeter incompletely characterized splenic lesions with postcontrast hyperenhancement and hypoenhancement on delayed images. These may represent perfusional variants or small hemangiomas. Attention on follow-up imaging is recommended for better characterization. The study was marked in EPIC for significant notification. Workstation performed: [id number]     XR foot 3+ vw left    Result Date: 2/20/2024  Narrative: XR FOOT 3+ VW LEFT INDICATION: r/o OM. COMPARISON: 11/19/2023 FINDINGS: No acute fracture or dislocation. Stable chronic subluxation dislocation at the second and fifth MTP joint. Hallux valgus, metatarsus varus deformity. Degenerative changes in the first metatarsophalangeal joint. There is focal osteopenia and poor cortical definition of the fifth middle and distal phalanx. Cannot exclude osteomyelitis. Consider MRI for further evaluation. Forefoot soft tissue swelling.     Impression: There is focal osteopenia and poor cortical definition of the fifth middle and distal phalanx. Cannot exclude osteomyelitis. Consider MRI for further evaluation. The study was marked in EPIC for significant notification. Workstation performed: [id number]     IR INPATIENT Paracentesis    Result Date: 2/19/2024  Narrative: Ultrasound-guided paracentesis Clinical History: Abdominal ascites Procedure: After explaining the risks and benefits of the procedure to the patient, informed consent was obtained. Ultrasound used to localize the right lower quadrant ascites. The overlying skin was prepped and draped in usual sterile fashion and local anesthesia was obtained with the 1% lidocaine  solution. A 5 Austrian Yueh needle was advanced until fluid was aspirated under live ultrasound guidance. Approximately 1800 cc' s of cloudy, yellow fluid was aspirated. Specimens obtained/sent The patient tolerated the procedure well and left the department in stable condition.     Impression: Impression: Ultrasound-guided right paracentesis. Signed, performed, and dictated by AMOR Rueda under the supervision of Dr. Looney. Workstation performed: [id number]      VAS VENOUS DUPLEX - LOWER LIMB BILATERAL    Result Date: 2/19/2024  Narrative:  [facility] REPORT CLINICAL: Indications: Patient presents with bilateral lower extremity swelling. Operative History: No prior cardiovascular surgeries Risk Factors The patient has history of Obesity, HTN, Diabetes (IDDM) and previous smoking (quit >10yrs ago).   CONCLUSION:  Impression:  RIGHT LOWER LIMB: No evidence of acute or chronic deep vein thrombosis. No evidence of superficial thrombophlebitis noted. Doppler evaluation shows a normal response to augmentation maneuvers.. Popliteal, posterior tibial and anterior tibial arterial Doppler waveform's are triphasic.  LEFT LOWER LIMB: No evidence of acute or chronic deep vein thrombosis. No evidence of superficial thrombophlebitis noted. Doppler evaluation shows a normal response to augmentation maneuvers. Popliteal, posterior tibial and anterior tibial arterial Doppler waveform's are triphasic.  Tech Note: There are two echogenic structures located in the bilateral inguinal region measuring approximately 2.14 cm on the right and 3.56 cm on the left suggestive of enlarged lymphatic channels.  Tech note: A portion of the study was performed by current DMS student under direct supervision from a registered senior vascular technologist with approval from patient.  SIGNATURE: Electronically Signed by: LOUIE CASAS MD, RPVI on 2024-02-19 01:00:45 PM    XR chest 2 views    Result Date: 2/19/2024  Narrative: XR  CHEST PA & LATERAL INDICATION: Dyspnea.. COMPARISON: 12/20/2023 FINDINGS: Pulmonary vascular congestion. Small pleural effusions. No pneumothorax. Enlarged cardiac silhouette, unchanged. No acute osseous abnormality within the limitations of portable radiography. Left reverse shoulder arthroplasty. Normal upper abdomen.     Impression: Pulmonary vascular congestion. Small pleural effusions. Enlarged cardiac silhouette. Findings concur with the preliminary report by the referring clinician already in PACS and/or our electronic record EPIC. Workstation performed: [id number]      bedside procedure    Result Date: 2/19/2024  Narrative: [id number]      EKG personally reviewed by Shaun Monge MD.     Counseling / Coordination of Care  Total floor / unit time spent today 30 minutes.  Greater than 50% of total time was spent with the patient and / or family counseling and / or coordination of care.

## 2024-02-27 NOTE — ASSESSMENT & PLAN NOTE
Patient with significant edema bilateral lower extremities and ascites.  Cardiology progress note and consultation appreciated.  Likely secondary to cirrhosis and low albumin rather than diastolic congestive heart failure  Continue IV bumex today, possible transition to PO diuretics tomorrow if he continues to improve  Na restriction

## 2024-02-27 NOTE — PLAN OF CARE
Problem: Potential for Falls  Goal: Patient will remain free of falls  Description: INTERVENTIONS:  - Educate patient/family on patient safety including physical limitations  - Instruct patient to call for assistance with activity   - Consult OT/PT to assist with strengthening/mobility   - Keep Call bell within reach  - Keep bed low and locked with side rails adjusted as appropriate  - Keep care items and personal belongings within reach  - Initiate and maintain comfort rounds  - Make Fall Risk Sign visible to staff  - Offer Toileting every 2 Hours, in advance of need  - Initiate/Maintain bed alarm  - Obtain necessary fall risk management equipment:     - Apply yellow socks and bracelet for high fall risk patients  - Consider moving patient to room near nurses station  2/27/2024 0745 by Martha Dowd, RN  Outcome: Progressing  2/27/2024 0744 by Martha Dowd, RN  Outcome: Progressing

## 2024-02-27 NOTE — ASSESSMENT & PLAN NOTE
Lab Results   Component Value Date    HGBA1C 7.3 (H) 08/18/2023       Recent Labs     02/26/24  1637 02/26/24 2039 02/27/24  0748 02/27/24  1143   POCGLU 246* 251* 96 267*         Blood Sugar Average: Last 72 hrs:  (P) [id number]  Lantus was decreased from 40 units to 30 units the night of 2/19 and patient was hypoglycemic on 2/20 requiring dextrose pushes and a few hours of D5NS as he was NPO for EGD.   Additionally, Amaryl was discontinued and Lantus was further decreased from 30 to 15 units.  Of note, patient did not receive Lantus last night and was hypoglycemic again at 64 requiring dextrose.  Continue SSI and glucose checks  Hypoglycemia protocol

## 2024-02-27 NOTE — PLAN OF CARE
Problem: Potential for Falls  Goal: Patient will remain free of falls  Description: INTERVENTIONS:  - Educate patient/family on patient safety including physical limitations  - Instruct patient to call for assistance with activity   - Consult OT/PT to assist with strengthening/mobility   - Keep Call bell within reach  - Keep bed low and locked with side rails adjusted as appropriate  - Keep care items and personal belongings within reach  - Initiate and maintain comfort rounds  - Make Fall Risk Sign visible to staff  - Offer Toileting every 2 Hours, in advance of need  - Initiate/Maintain bed/chair alarm  - Obtain necessary fall risk management equipment: slippers  Problem: GASTROINTESTINAL - ADULT  Goal: Minimal or absence of nausea and/or vomiting  Description: INTERVENTIONS:  - Administer IV fluids if ordered to ensure adequate hydration  - Maintain NPO status until nausea and vomiting are resolved  - Nasogastric tube if ordered  - Administer ordered antiemetic medications as needed  - Provide nonpharmacologic comfort measures as appropriate  - Advance diet as tolerated, if ordered  - Consider nutrition services referral to assist patient with adequate nutrition and appropriate food choices  Outcome: Progressing     Problem: INFECTION - ADULT  Goal: Absence or prevention of progression during hospitalization  Description: INTERVENTIONS:  - Assess and monitor for signs and symptoms of infection  - Monitor lab/diagnostic results  - Monitor all insertion sites, i.e. indwelling lines, tubes, and drains  - Monitor endotracheal if appropriate and nasal secretions for changes in amount and color  - [place] appropriate cooling/warming therapies per order  - Administer medications as ordered  - Instruct and encourage patient and family to use good hand hygiene technique  - Identify and instruct in appropriate isolation precautions for identified infection/condition  Outcome: Progressing     Problem: DISCHARGE  PLANNING  Goal: Discharge to home or other facility with appropriate resources  Description: INTERVENTIONS:  - Identify barriers to discharge w/patient and caregiver  - Arrange for needed discharge resources and transportation as appropriate  - Identify discharge learning needs (meds, wound care, etc.)  - Arrange for interpretive services to assist at discharge as needed  - Refer to Case Management Department for coordinating discharge planning if the patient needs post-hospital services based on physician/advanced practitioner order or complex needs related to functional status, cognitive ability, or social support system  Outcome: Progressing     Problem: Knowledge Deficit  Goal: Patient/family/caregiver demonstrates understanding of disease process, treatment plan, medications, and discharge instructions  Description: Complete learning assessment and assess knowledge base.  Interventions:  - Provide teaching at level of understanding  - Provide teaching via preferred learning methods  Outcome: Progressing     - Apply yellow socks and bracelet for high fall risk patients  - Consider moving patient to room near nurses station  Outcome: Progressing     Problem: RESPIRATORY - ADULT  Goal: Achieves optimal ventilation and oxygenation  Description: INTERVENTIONS:  - Assess for changes in respiratory status  - Assess for changes in mentation and behavior  - Position to facilitate oxygenation and minimize respiratory effort  - Oxygen administered by appropriate delivery if ordered  - Initiate smoking cessation education as indicated  - Encourage broncho-pulmonary hygiene including cough, deep breathe, Incentive Spirometry  - Assess the need for suctioning and aspirate as needed  - Assess and instruct to report SOB or any respiratory difficulty  - Respiratory Therapy support as indicated  Outcome: Progressing

## 2024-02-27 NOTE — PROGRESS NOTES
[facility]  Progress Note  Name: Isael Avendano I  MRN: [id number]  Unit/Bed#: NW8 861-02 I Date of Admission: 2/18/2024   Date of Service: 2/27/2024 I Hospital Day: 9    Assessment/Plan   * Symptomatic anemia  Assessment & Plan  GI and Heme/Onc following. 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  Cardiology progress note and consultation appreciated.  Likely secondary to cirrhosis and low albumin rather than diastolic congestive heart failure  Continue IV bumex today, possible transition to PO diuretics tomorrow if he continues to improve  Na restriction    Cirrhosis, alcoholic (HCC)  Assessment & Plan  GI following, s/p EGD 2/20 as above  Status post paracentesis x 2.  Removed a total of 3.8 L of fluid.  Continue with diuresis  Patient need outpatient follow-up with gastroenterology    Chest wall mass  Assessment & Plan  Surgical oncology consultation appreciated.  Recommended IR biopsy.    Consulted IR for the biopsy.  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  History suggestive of postnasal  drip.    Previous imaging study is reviewed.  Ct chest reviewed     Stage 3a chronic kidney disease (HCC)  Assessment & Plan  Lab Results   Component Value Date    EGFR 92 02/27/2024    EGFR 95 02/26/2024    EGFR 90 02/25/2024    CREATININE 0.76 02/27/2024    CREATININE 0.70 02/26/2024    CREATININE 0.80 02/25/2024   Monitor renal function  Diuretic recs per cardiology.  Monitor creatinine with diuresis    Diabetic ulcer of left foot associated with type 2 diabetes mellitus (HCC)  Assessment & Plan  Patient has been on Levquin started in [place], was told he had contracted a bacteria from the water?   Unable to see records in [facility] from [facility]    Phone number [phone] Dr. Fong.   Podiatry following   MRI concerning for OM--no acute infection per podiatry.  Will DC antibiotics.  Discussed with podiatry does not feel like the patient needs to be on antibiotics at this point.  Surgical plan per podiatry.  Cardiology evaluation appreciated.    Patient is definitely high risk for any surgical intervention but risk are non modifiable-as per podiatry progress note they are not planning for any surgical intervention.  Will DC antibiotics and monitor  Podiatry is considering surgical intervention.  Patient is at acceptable risk for the toe amputation    MEG (obstructive sleep apnea)  Assessment & Plan  Patient has brought his home cpap    Essential hypertension  Assessment & Plan  BP stable,   Continue with diuresis    DM2 (diabetes mellitus, type 2) ([facility])  Assessment & Plan  Lab Results   Component Value Date    HGBA1C 7.3 (H) 08/18/2023       Recent Labs     02/26/24  1637 02/26/24  2039 02/27/24  0748 02/27/24  1143   POCGLU 246* 251* 96 267*         Blood Sugar Average: Last 72 hrs:  (P) [id number]  Lantus was decreased from 40 units to 30 units the night of 2/19 and patient was hypoglycemic on 2/20 requiring dextrose pushes and a few hours of D5NS as he was NPO for EGD.   Additionally,  Amaryl was discontinued and Lantus was further decreased from 30 to 15 units.  Of note, patient did not receive Lantus last night and was hypoglycemic again at 64 requiring dextrose.  Continue SSI and glucose checks  Hypoglycemia protocol                VTE Pharmacologic Prophylaxis: VTE Score: 4 Moderate Risk (Score 3-4) - Pharmacological DVT Prophylaxis Ordered: heparin.    Mobility:   Basic Mobility Inpatient Raw Score: 23  JH-HLM Goal: 7: Walk 25 feet or more  JH-HLM Achieved: 7: Walk 25 feet or more  HLM Goal achieved. Continue to encourage appropriate mobility.    Patient Centered Rounds: I performed bedside rounds with nursing staff today.   Discussions with Specialists or Other Care Team Provider: nurse, CM    Education and Discussions with Family / Patient:  multiple family at bedside.     Total Time Spent on Date of Encounter in care of patient: 40 mins. This time was spent on one or more of the following: performing physical exam; counseling and coordination of care; obtaining or reviewing history; documenting in the medical record; reviewing/ordering tests, medications or procedures; communicating with other healthcare professionals and discussing with patient's family/caregivers.    Current Length of Stay: 9 day(s)  Current Patient Status: Inpatient   Certification Statement: The patient will continue to require additional inpatient hospital stay due to continue diuresis  Discharge Plan: Anticipate discharge in 24-48 hrs to home.    Code Status: Level 1 - Full Code    Subjective:   Denies any new complaints. Reports his edema is improving. Tolerating diet, denies abdominal bloating    Objective:     Vitals:   Temp (24hrs), Av.2 °F (36.8 °C), Min:97.8 °F (36.6 °C), Max:98.5 °F (36.9 °C)    Temp:  [97.8 °F (36.6 °C)-98.5 °F (36.9 °C)] 98.2 °F (36.8 °C)  HR:  [81-97] 92  Resp:  [16] 16  BP: (114-131)/(58-62) 114/58  Body mass index is 36.88 kg/m².     Input and Output Summary (last 24 hours):      Intake/Output Summary (Last 24 hours) at 2/27/2024 1338  Last data filed at 2/27/2024 0930  Gross per 24 hour   Intake 120 ml   Output 2420 ml   Net -2300 ml       Physical Exam:   Physical Exam  Constitutional:       General: He is not in acute distress.     Appearance: He is obese. He is not toxic-appearing.   HENT:      Head: Normocephalic and atraumatic.      Nose: Nose normal.   Eyes:      Extraocular Movements: Extraocular movements intact.   Cardiovascular:      Rate and Rhythm: Normal rate and regular rhythm.   Pulmonary:      Effort: Pulmonary effort is normal.      Breath sounds: No wheezing or rales.   Abdominal:      General: There is no distension.      Palpations: Abdomen is soft.      Tenderness: There is no abdominal tenderness.   Musculoskeletal:      Right lower leg: Edema present.      Left lower leg: Edema present.   Skin:     General: Skin is warm and dry.   Neurological:      Mental Status: He is alert and oriented to person, place, and time.   Psychiatric:         Mood and Affect: Mood normal.         Behavior: Behavior normal.          Additional Data:     Labs:  Results from last 7 days   Lab Units 02/27/24  0510   WBC Thousand/uL 2.59*   HEMOGLOBIN g/dL 8.0*   HEMATOCRIT % 26.6*   PLATELETS Thousands/uL 55*     Results from last 7 days   Lab Units 02/27/24  0510 02/24/24  0514 02/23/24  0557   SODIUM mmol/L 138   < > 137   POTASSIUM mmol/L 3.6   < > 4.0   CHLORIDE mmol/L 104   < > 106   CO2 mmol/L 30   < > 24   BUN mg/dL 11   < > 12   CREATININE mg/dL 0.76   < > 0.88   ANION GAP mmol/L 4   < > 7   CALCIUM mg/dL 8.1*   < > 8.2*   ALBUMIN g/dL  --   --  2.6*   TOTAL BILIRUBIN mg/dL  --   --  2.25*   ALK PHOS U/L  --   --  207*   ALT U/L  --   --  41   AST U/L  --   --  69*   GLUCOSE RANDOM mg/dL 108   < > 102    < > = values in this interval not displayed.         Results from last 7 days   Lab Units 02/27/24  1143 02/27/24  0748 02/26/24  2039 02/26/24  1637 02/26/24  1229 02/26/24  0750  02/25/24 2050 02/25/24  1645 02/25/24  1151 02/25/24  0628 02/24/24  2038 02/24/24  1632   POC GLUCOSE mg/dl 267* 96 251* 246* 216* 117 258* 210* 225* 143* 233* 242*               Lines/Drains:  Invasive Devices       Peripheral Intravenous Line  Duration             Peripheral IV 02/26/24 Right Antecubital 1 day                          Imaging: No pertinent imaging reviewed.    Recent Cultures (last 7 days):   Results from last 7 days   Lab Units 02/21/24  1530   GRAM STAIN RESULT  1+ Polys  No bacteria seen   BODY FLUID CULTURE, STERILE  No growth       Last 24 Hours Medication List:   Current Facility-Administered Medications   Medication Dose Route Frequency Provider Last Rate    acetaminophen  650 mg Oral Q6H PRN Lou Smith MD      benzonatate  200 mg Oral TID PRN Lou Smith MD      bumetanide  2 mg Intravenous BID Yajaira Boone MD      dextromethorphan-guaiFENesin  10 mL Oral Q4H PRN Alize Sanchez PA-C      fluticasone  1 spray Each Nare Daily Yajaira Boone MD      insulin glargine  10 Units Subcutaneous HS Yajaira Boone MD      insulin lispro  1-5 Units Subcutaneous TID AC Lou Smith MD      insulin lispro  1-5 Units Subcutaneous HS Luo Smith MD      insulin lispro  3 Units Subcutaneous TID With Meals Yajaira Boone MD      iron sucrose  300 mg Intravenous Once per day on Monday Wednesday Friday Winston Mcneill, DO      multivitamin stress formula  1 tablet Oral Daily Lou Smith MD      ondansetron  4 mg Intravenous Q6H PRN Lou Smith MD      potassium chloride  10 mEq Oral BID Shaun Monge MD      spironolactone  50 mg Oral Daily Cait Heller, DO      tamsulosin  0.4 mg Oral Daily With Dinner Lou Smith MD      traZODone  50 mg Oral HS Lou Smith MD          Today, Patient Was Seen By: Louie Tobar MD    **Please Note: This note may have been constructed using a voice recognition system.**

## 2024-02-27 NOTE — ASSESSMENT & PLAN NOTE
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

## 2024-02-28 VITALS
BODY MASS INDEX: 33.24 KG/M2 | OXYGEN SATURATION: 98 % | RESPIRATION RATE: 16 BRPM | WEIGHT: 237.44 LBS | HEIGHT: 71 IN | HEART RATE: 84 BPM | TEMPERATURE: 98.2 F | DIASTOLIC BLOOD PRESSURE: 53 MMHG | SYSTOLIC BLOOD PRESSURE: 112 MMHG

## 2024-02-28 LAB
ANION GAP SERPL CALCULATED.3IONS-SCNC: 7 MMOL/L
BUN SERPL-MCNC: 10 MG/DL (ref 5–25)
CALCIUM SERPL-MCNC: 8.4 MG/DL (ref 8.4–10.2)
CHLORIDE SERPL-SCNC: 102 MMOL/L (ref 96–108)
CO2 SERPL-SCNC: 31 MMOL/L (ref 21–32)
CREAT SERPL-MCNC: 0.89 MG/DL (ref 0.6–1.3)
ERYTHROCYTE [DISTWIDTH] IN BLOOD BY AUTOMATED COUNT: 27.6 % (ref 11.6–15.1)
GFR SERPL CREATININE-BSD FRML MDRD: 86 ML/MIN/1.73SQ M
GLUCOSE SERPL-MCNC: 110 MG/DL (ref 65–140)
GLUCOSE SERPL-MCNC: 133 MG/DL (ref 65–140)
GLUCOSE SERPL-MCNC: 247 MG/DL (ref 65–140)
HCT VFR BLD AUTO: 28.4 % (ref 36.5–49.3)
HGB BLD-MCNC: 8.3 G/DL (ref 12–17)
MCH RBC QN AUTO: 24 PG (ref 26.8–34.3)
MCHC RBC AUTO-ENTMCNC: 29.2 G/DL (ref 31.4–37.4)
MCV RBC AUTO: 82 FL (ref 82–98)
PLATELET # BLD AUTO: 62 THOUSANDS/UL (ref 149–390)
POTASSIUM SERPL-SCNC: 3.6 MMOL/L (ref 3.5–5.3)
RBC # BLD AUTO: 3.46 MILLION/UL (ref 3.88–5.62)
SODIUM SERPL-SCNC: 140 MMOL/L (ref 135–147)
WBC # BLD AUTO: 2.55 THOUSAND/UL (ref 4.31–10.16)

## 2024-02-28 PROCEDURE — 85027 COMPLETE CBC AUTOMATED: CPT | Performed by: INTERNAL MEDICINE

## 2024-02-28 PROCEDURE — 82948 REAGENT STRIP/BLOOD GLUCOSE: CPT

## 2024-02-28 PROCEDURE — 99232 SBSQ HOSP IP/OBS MODERATE 35: CPT | Performed by: INTERNAL MEDICINE

## 2024-02-28 PROCEDURE — 80048 BASIC METABOLIC PNL TOTAL CA: CPT | Performed by: INTERNAL MEDICINE

## 2024-02-28 PROCEDURE — 99239 HOSP IP/OBS DSCHRG MGMT >30: CPT | Performed by: INTERNAL MEDICINE

## 2024-02-28 RX ORDER — POTASSIUM CHLORIDE 20 MEQ/1
20 TABLET, EXTENDED RELEASE ORAL DAILY
Status: DISCONTINUED | OUTPATIENT
Start: 2024-02-29 | End: 2024-02-28 | Stop reason: HOSPADM

## 2024-02-28 RX ORDER — BUMETANIDE 1 MG/1
1 TABLET ORAL DAILY
Status: DISCONTINUED | OUTPATIENT
Start: 2024-02-29 | End: 2024-02-28 | Stop reason: HOSPADM

## 2024-02-28 RX ORDER — FLUTICASONE PROPIONATE 50 MCG
1 SPRAY, SUSPENSION (ML) NASAL DAILY
Qty: 16 G | Refills: 0 | Status: SHIPPED | OUTPATIENT
Start: 2024-02-29

## 2024-02-28 RX ORDER — POTASSIUM CHLORIDE 20 MEQ/1
20 TABLET, EXTENDED RELEASE ORAL DAILY
Qty: 30 TABLET | Refills: 0 | Status: SHIPPED | OUTPATIENT
Start: 2024-02-29

## 2024-02-28 RX ORDER — BUMETANIDE 1 MG/1
1 TABLET ORAL DAILY
Qty: 30 TABLET | Refills: 0 | Status: SHIPPED | OUTPATIENT
Start: 2024-02-29

## 2024-02-28 RX ORDER — SPIRONOLACTONE 50 MG/1
50 TABLET, FILM COATED ORAL DAILY
Qty: 30 TABLET | Refills: 0 | Status: SHIPPED | OUTPATIENT
Start: 2024-02-29

## 2024-02-28 RX ADMIN — FLUTICASONE PROPIONATE 1 SPRAY: 50 SPRAY, METERED NASAL at 09:49

## 2024-02-28 RX ADMIN — POTASSIUM CHLORIDE 10 MEQ: 750 TABLET, EXTENDED RELEASE ORAL at 09:46

## 2024-02-28 RX ADMIN — IRON SUCROSE 300 MG: 20 INJECTION, SOLUTION INTRAVENOUS at 09:47

## 2024-02-28 RX ADMIN — INSULIN LISPRO 3 UNITS: 100 INJECTION, SOLUTION INTRAVENOUS; SUBCUTANEOUS at 12:14

## 2024-02-28 RX ADMIN — B-COMPLEX W/ C & FOLIC ACID TAB 1 TABLET: TAB at 09:45

## 2024-02-28 RX ADMIN — BUMETANIDE 2 MG: 0.25 INJECTION INTRAMUSCULAR; INTRAVENOUS at 09:56

## 2024-02-28 RX ADMIN — INSULIN LISPRO 3 UNITS: 100 INJECTION, SOLUTION INTRAVENOUS; SUBCUTANEOUS at 09:48

## 2024-02-28 RX ADMIN — SPIRONOLACTONE 50 MG: 50 TABLET, FILM COATED ORAL at 09:46

## 2024-02-28 RX ADMIN — INSULIN LISPRO 2 UNITS: 100 INJECTION, SOLUTION INTRAVENOUS; SUBCUTANEOUS at 12:14

## 2024-02-28 NOTE — PROGRESS NOTES
"General Cardiology   Progress Note -  Team One   Isael Avendano 68 y.o. male MRN: [id number]    Unit/Bed#: NW8 861-02 Encounter: [id number]    Assessment/ Plan    Volume overload   In the setting of cirrhosis   Creatinine stable: 0.70  On Bumex 2 mg IV BID and spirolactone 50 mg PO daily   He takes furosemide 20 mg PO every other day at home prior to admission   Will start bumex 1 mg PO daily   Monitor I/Os -3.7 L but no input not accurate   Daily weights 237 lbs today standing. Patient reports prior weight yesterday of 257 lbs was bedscale.  Patient is stable from cardiac standpoint for discharge. He will follow up with his PCP regarding diuretic management.     2.  Anemia   Hemoglobin stable: 8.3  S/p PRBC     3. Osteomyelitis of L foot   Podiatry following   Monitoring off antibiotics   Local wound care recommended at this time      4. Hypokalemia   K 3.6  On potassium chloride 10 meq PO BID     Subjective  Patient resting comfortable in chair with family at bedside. No complaint of SOB, chest pain or palpitations. No fever or chills.     Review of Systems   Constitutional: Negative for chills and fever.   HENT:  Negative for congestion.    Cardiovascular:  Negative for chest pain, dyspnea on exertion, leg swelling, orthopnea and palpitations.   Respiratory:  Negative for shortness of breath.    Musculoskeletal:  Negative for falls.   Gastrointestinal:  Negative for bloating, nausea and vomiting.   Neurological:  Negative for dizziness and light-headedness.   Psychiatric/Behavioral:  Negative for altered mental status.    All other systems reviewed and are negative.      Objective:   Vitals: Blood pressure 112/53, pulse 84, temperature 98.2 °F (36.8 °C), temperature source Oral, resp. rate 16, height 5' 11\" (1.803 m), weight 108 kg (237 lb 7 oz), SpO2 98%.,       Body mass index is 34.07 kg/m².,     Systolic (24hrs), Av , Min:112 , Max:122     Diastolic (24hrs), Av, Min:53, Max:60      Intake/Output Summary " (Last 24 hours) at 2/28/2024 0955  Last data filed at 2/27/2024 2001  Gross per 24 hour   Intake 570 ml   Output 3550 ml   Net -2980 ml     Weight (last 2 days)       Date/Time Weight    02/28/24 0600 108 (237.44)    02/27/24 0544 117 (257)    02/26/24 0537 117 (257)            Physical Exam  HENT:      Head: Normocephalic.      Mouth/Throat:      Mouth: Mucous membranes are moist.   Cardiovascular:      Rate and Rhythm: Normal rate and regular rhythm.      Pulses: Normal pulses.      Heart sounds: Murmur heard.   Pulmonary:      Effort: Pulmonary effort is normal. No respiratory distress.      Breath sounds: Normal breath sounds.   Abdominal:      General: Bowel sounds are normal.      Palpations: Abdomen is soft.   Musculoskeletal:         General: Swelling present. Normal range of motion.      Cervical back: Neck supple.      Comments: Mild non pitting edema bilateral LE    Skin:     General: Skin is warm.   Neurological:      Mental Status: He is alert and oriented to person, place, and time.   Psychiatric:         Mood and Affect: Mood normal.         LABORATORY RESULTS      CBC with diff:   Results from last 7 days   Lab Units 02/28/24  0531 02/27/24  0510 02/25/24  1128 02/24/24  1215 02/23/24  0557 02/22/24  0536 02/21/24  1018   WBC Thousand/uL 2.55* 2.59* 3.33* 3.55* 3.41* 3.03* 2.49*   HEMOGLOBIN g/dL 8.3* 8.0* 8.7* 7.9* 7.5* 7.4* 7.3*   HEMATOCRIT % 28.4* 26.6* 30.2* 27.3* 27.3* 27.3* 24.1*   MCV fL 82 80* 82 81* 85 86 77*   PLATELETS Thousands/uL 62* 55* 48* 44* 43* 45* 38*   RBC Million/uL 3.46* 3.34* 3.68* 3.38* 3.23* 3.19* 3.15*   MCH pg 24.0* 24.0* 23.6* 23.4* 23.2* 23.2* 23.2*   MCHC g/dL 29.2* 30.1* 28.8* 28.9* 27.5* 27.1* 30.3*   RDW % 27.6* 26.7* 24.9* 24.2* 23.0* 22.6* 21.2*       CMP:  Results from last 7 days   Lab Units 02/28/24  0531 02/27/24  0510 02/26/24  0452 02/25/24  1128 02/24/24  0514 02/23/24  0557 02/22/24  0536   POTASSIUM mmol/L 3.6 3.6 3.5 3.6 3.7 4.0 4.4   CHLORIDE mmol/L 102  "104 103 102 105 106 105   CO2 mmol/L 31 30 31 30 27 24 23   BUN mg/dL 10 11 11 11 12 12 13   CREATININE mg/dL 0.89 0.76 0.70 0.80 0.91 0.88 0.85   CALCIUM mg/dL 8.4 8.1* 8.3* 8.6 8.2* 8.2* 8.1*   AST U/L  --   --   --   --   --  69* 92*   ALT U/L  --   --   --   --   --  41 43   ALK PHOS U/L  --   --   --   --   --  207* 214*   EGFR ml/min/1.73sq m 86 92 95 90 85 87 88       BMP:  Results from last 7 days   Lab Units 02/28/24  0531 02/27/24  0510 02/26/24  0452 02/25/24  1128 02/24/24  0514 02/23/24  0557 02/22/24  0536   POTASSIUM mmol/L 3.6 3.6 3.5 3.6 3.7 4.0 4.4   CHLORIDE mmol/L 102 104 103 102 105 106 105   CO2 mmol/L 31 30 31 30 27 24 23   BUN mg/dL 10 11 11 11 12 12 13   CREATININE mg/dL 0.89 0.76 0.70 0.80 0.91 0.88 0.85   CALCIUM mg/dL 8.4 8.1* 8.3* 8.6 8.2* 8.2* 8.1*       Lab Results   Component Value Date    NTBNP 79 12/25/2020             Results from last 7 days   Lab Units 02/24/24  0514   MAGNESIUM mg/dL 1.8*                           Lipid Profile:   No results found for: \"CHOL\"  No results found for: \"HDL\"  No results found for: \"LDLCALC\"  No results found for: \"TRIG\"    Cardiac testing:   No results found for this or any previous visit.    No results found for this or any previous visit.    No results found for this or any previous visit.    No valid procedures specified.  No results found for this or any previous visit.        Meds/Allergies   all current active meds have been reviewed and current meds:   Current Facility-Administered Medications   Medication Dose Route Frequency    acetaminophen (TYLENOL) tablet 650 mg  650 mg Oral Q6H PRN    benzonatate (TESSALON PERLES) capsule 200 mg  200 mg Oral TID PRN    bumetanide (BUMEX) injection 2 mg  2 mg Intravenous BID    dextromethorphan-guaiFENesin (ROBITUSSIN DM) oral syrup 10 mL  10 mL Oral Q4H PRN    fluticasone (FLONASE) 50 mcg/act nasal spray 1 spray  1 spray Each Nare Daily    insulin glargine (LANTUS) subcutaneous injection 10 Units 0.1 mL "  10 Units Subcutaneous HS    insulin lispro (HumaLOG) 100 units/mL subcutaneous injection 1-5 Units  1-5 Units Subcutaneous TID AC    insulin lispro (HumaLOG) 100 units/mL subcutaneous injection 1-5 Units  1-5 Units Subcutaneous HS    insulin lispro (HumaLOG) 100 units/mL subcutaneous injection 3 Units  3 Units Subcutaneous TID With Meals    iron sucrose (VENOFER) 300 mg in sodium chloride 0.9 % 250 mL IVPB  300 mg Intravenous Once per day on Monday Wednesday Friday    multivitamin stress formula tablet 1 tablet  1 tablet Oral Daily    ondansetron (ZOFRAN) injection 4 mg  4 mg Intravenous Q6H PRN    potassium chloride (Klor-Con M10) CR tablet 10 mEq  10 mEq Oral BID    spironolactone (ALDACTONE) tablet 50 mg  50 mg Oral Daily    tamsulosin (FLOMAX) capsule 0.4 mg  0.4 mg Oral Daily With Dinner    traZODone (DESYREL) tablet 50 mg  50 mg Oral HS     Medications Prior to Admission   Medication    levofloxacin (LEVAQUIN) 750 mg tablet    benzonatate (TESSALON) 200 MG capsule    furosemide (LASIX) 20 mg tablet    glimepiride (AMARYL) 4 mg tablet    Insulin Pen Needle (BD Pen Needle Nayla 2nd Gen) 32G X 4 MM MISC    Lantus SoloStar 100 units/mL SOPN    Multiple Vitamin (multivitamin) capsule    polyethylene glycol (GOLYTELY) 4000 mL solution    tamsulosin (FLOMAX) 0.4 mg    traZODone (DESYREL) 50 mg tablet       Counseling / Coordination of Care  Total floor / unit time spent today 20 minutes.  Greater than 50% of total time was spent with the patient and / or family counseling and / or coordination of care.      ** Please Note: Dragon 360 Dictation voice to text software may have been used in the creation of this document. **

## 2024-02-28 NOTE — ASSESSMENT & PLAN NOTE
GI and Heme/Onc following. 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

## 2024-02-28 NOTE — ASSESSMENT & PLAN NOTE
Patient has been on Levquin started in [place], was told he had contracted a bacteria from the water?   Unable to see records in [facility] from [facility]    Phone number [phone] Dr. Fong.   Podiatry following   MRI concerning for OM--no acute infection per podiatry.  Will DC antibiotics.  Discussed with podiatry does not feel like the patient needs to be on antibiotics at this point.  Surgical plan per podiatry.  Cardiology evaluation appreciated.    Patient is definitely high risk for any surgical intervention but risk are non modifiable-as per podiatry progress note they are not planning for any surgical intervention.  Will DC antibiotics and monitor  Podiatry is considering surgical intervention.  Patient is at acceptable risk for the toe amputation

## 2024-02-28 NOTE — ASSESSMENT & PLAN NOTE
Lab Results   Component Value Date    EGFR 86 02/28/2024    EGFR 92 02/27/2024    EGFR 95 02/26/2024    CREATININE 0.89 02/28/2024    CREATININE 0.76 02/27/2024    CREATININE 0.70 02/26/2024   Monitor renal function  Diuretic recs per cardiology.  Monitor creatinine with diuresis

## 2024-02-28 NOTE — CASE MANAGEMENT
Case Management Discharge Planning Note    Patient name Isael Avendano  Location 8 861/NW8 861-02 MRN [id number]  : 1955 Date 2024       Current Admission Date: 2024  Current Admission Diagnosis:Symptomatic anemia   Patient Active Problem List    Diagnosis Date Noted    Volume overload 2024    Chest wall mass 2024    Cough 2024    Symptomatic anemia 2024    Perineal abscess 2023    Pyogenic arthritis of left shoulder region (HCC) 2023    Urinary retention 2023    Stage 3a chronic kidney disease (HCC) 06/15/2023    Murmur 06/15/2023    Postoperative infection of surgical wound 2023    Ulcer of left foot, limited to breakdown of skin ([facility]) 2023    Aftercare following left shoulder joint replacement surgery 2023    S/P reverse total shoulder arthroplasty, left 2023    Obesity, morbid ([facility]) 2022    Post-traumatic osteoarthritis of left shoulder 2022    Morbid obesity ([facility]) 2022    Cellulitis 2022    Diabetic ulcer of left foot associated with type 2 diabetes mellitus ([facility]) 2022    Leukopenia 2022    Insomnia 2020    Elevated troponin 2020    Chest pressure 2020    Alcohol abuse 2020    DM2 (diabetes mellitus, type 2) ([facility]) 2020    Essential hypertension 2020    ABILIO (acute kidney injury) ([facility]) 2020    Cholelithiasis 2020    Cirrhosis, alcoholic ([facility]) 2020    MEG (obstructive sleep apnea) 2020    Pancytopenia ([facility]) 2020      LOS (days): 10  Geometric Mean LOS (GMLOS) (days): 2.8  Days to GMLOS:-7.2     OBJECTIVE:  Risk of Unplanned Readmission Score: 22.29         Current admission status: Inpatient   Preferred Pharmacy:   [id number] - [place] - [address]  [address] [place] [address]  Phone: [phone] Fax: [phone]    Primary Care Provider: Collin Marcos MD    Primary Insurance: MEDICARE  Secondary  Insurance: [employer]    DISCHARGE DETAILS:    Discharge planning discussed with:: patient and spouse at bedside       IMM Given (Date):: 02/28/24  IMM Given to:: Patient ..IMM reviewed with patient and caregiver, patient and caregiver agrees with discharge determination.   Family notified:: spouse at bedside   ..CM reviewed d/c planning process including the following: identifying help at home, patient preference for d/c planning needs, Discharge Lounge, [facility] Meds to Bed program, availability of treatment team to discuss questions or concerns patient and/or family may have regarding understanding medications and recognizing signs and symptoms once discharged.  CM also encouraged patient to follow up with all recommended appointments after discharge. Patient advised of importance for patient and family to participate in managing patient’s medical well being.

## 2024-02-28 NOTE — ASSESSMENT & PLAN NOTE
Lab Results   Component Value Date    HGBA1C 7.3 (H) 08/18/2023       Recent Labs     02/27/24  1638 02/27/24 2052 02/28/24  0725 02/28/24  1121   POCGLU 292* 249* 133 247*         Blood Sugar Average: Last 72 hrs:  (P) 210.0532413508081212  Resume home regimen on discharge

## 2024-02-28 NOTE — ASSESSMENT & PLAN NOTE
Patient with significant edema bilateral lower extremities and ascites.  Cardiology progress note and consultation appreciated.  Likely secondary to cirrhosis and low albumin rather than diastolic congestive heart failure  Low Na diet  Change to PO bumex on discharge

## 2024-02-28 NOTE — DISCHARGE SUMMARY
[facility]  Discharge- Isael Avendano 1955, 68 y.o. male MRN: [id number]  Unit/Bed#: NW8 861-02 Encounter: [id number]  Primary Care Provider: Collin Marcos MD   Date and time admitted to hospital: 2/18/2024 12:24 PM    * Symptomatic anemia  Assessment & Plan  GI and Heme/Onc following. 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 for which 4 bands were successful placed, moderate erythematous mucosa in the antrum to which coagulation was induced with argon plasma coagulation  On IV Venofer -oncology evaluation appreciated- planning for 6 doses per hematology  Hemoglobin remained relatively stable  Outpatient follow up with hematology    Volume overload  Assessment & Plan  Patient with significant edema bilateral lower extremities and ascites.  Cardiology progress note and consultation appreciated.  Likely secondary to cirrhosis and low albumin rather than diastolic congestive heart failure  Low Na diet  Change to PO bumex on discharge    Cirrhosis, alcoholic (HCC)  Assessment & Plan  GI following, s/p EGD 2/20 as above  Status post paracentesis x 2.  Removed a total of 3.8 L of fluid.  Continue with diuresis  Patient need outpatient follow-up with gastroenterology    Chest wall mass  Assessment & Plan  Surgical oncology consultation appreciated.  Recommended IR biopsy.    Consulted IR for the biopsy.  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  History suggestive of postnasal  drip.    Previous imaging study is reviewed.  Ct chest reviewed     Urinary retention  Assessment & Plan  Continue flomax    Murmur  Assessment & Plan  Noted, echo revealed LVE 65%, Grade 2 DD  Given diffuse edema will consult cardiology to rule out cardiac cause for edema  Cardiology evaluation appreciated.-Outpatient follow-up with cardiology       Stage 3a chronic kidney disease (HCC)  Assessment & Plan  Lab Results   Component Value Date    EGFR 86 02/28/2024    EGFR 92 02/27/2024    EGFR 95 02/26/2024    CREATININE 0.89 02/28/2024    CREATININE 0.76 02/27/2024    CREATININE 0.70 02/26/2024   Monitor renal function  Diuretic recs per cardiology.  Monitor creatinine with diuresis    Diabetic ulcer of left foot associated with type 2 diabetes mellitus (HCC)  Assessment & Plan  Patient has been on Levquin started in [place], was told he had contracted a bacteria from the water?   Unable to see records in [facility] from [facility]    Phone number [phone] Dr. Fong.   Podiatry following   MRI concerning for OM--no acute infection per podiatry.  Will DC antibiotics.  Discussed with podiatry does not feel like the patient needs to be on antibiotics at this point.  Surgical plan per podiatry.  Cardiology evaluation appreciated.    Patient is definitely high risk for any surgical intervention but risk are non modifiable-as per podiatry progress note they are not planning for any surgical intervention.  Will DC antibiotics and monitor  Podiatry is considering surgical intervention.  Patient is at acceptable risk for the toe amputation    MEG (obstructive sleep apnea)  Assessment & Plan  Patient has brought his home cpap    Essential hypertension  Assessment & Plan  BP stable,   Continue current regimen    DM2 (diabetes mellitus, type 2) ([facility])  Assessment & Plan  Lab Results   Component Value Date    HGBA1C 7.3 (H) 08/18/2023       Recent Labs     02/27/24  1638 02/27/24  2052 02/28/24  0725 02/28/24  1121    POCGLU 292* 249* 133 247*         Blood Sugar Average: Last 72 hrs:  (P) [id number]  Resume home regimen on discharge        Medical Problems       Resolved Problems  Date Reviewed: 2/26/2024   None       Discharging Physician / Practitioner: Louie Tobar MD  PCP: Collin Marcos MD  Admission Date:   Admission Orders (From admission, onward)       Ordered        02/18/24 1617  INPATIENT ADMISSION  Once                          Discharge Date: 02/28/24    Consultations During Hospital Stay:  Cardiology  Medical oncology  Surgical oncology  Gastroenterology  podiatry    Procedures Performed:   EGD banding  paracentesis    Significant Findings / Test Results:   Three medium grade II varices in the esophagus; placed 4 bands successfully  Moderate erythematous mucosa in the antrum; induced coagulation with argon plasma coagulation  The duodenum appeared normal.    Markedly limited examination due to dielectric artifact from presence of ascites and other patient related factors.     Grossly stable size of  two hepatic segment 4 LI-RADS 3 observations which are incompletely characterized on current examination. Nonvisualization of additional 2 smaller LI-RADS 3 observations in hepatic segments 2 and 6 respectively, as seen on prior   MRI abdomen 10/23/2023. Follow-up MRI abdomen with and without contrast in 3 months is recommended for better assessment     Cirrhosis, splenomegaly and ascites, compatible with portal hypertension. Main portal vein is patent     New small bilateral pleural effusions     A 2.4 x 0.6 cm T1 hyperintense enhancing lesion along the left chest wall, new since MRI abdomen 10/23/2023 and likely present on CT 12/19/2023, likely representing reactive lymph node and less likely a nerve sheath tumor such as schwannoma. Attention on   follow-up imaging is recommended to assess for stability/resolution.     2 subcentimeter incompletely characterized splenic lesions with postcontrast  hyperenhancement and hypoenhancement on delayed images. These may represent perfusional variants or small hemangiomas. Attention on follow-up imaging is recommended for better   characterization.      1. Skin ulceration lateral aspect of the fourth proximal toe with marrow changes fourth proximal phalanx including T1 replacement signal and post gadolinium enhancement concerning for osteomyelitis.  2. Prominent first MTP degenerative change with hallux rigidus.    Slow-growing 2.4 x 0.9 cm left posterior pleural/paraspinal soft tissue structure increased from 1.6 x 0.7 cm on the 12/25/2020 study. I favor an area of pleural thickening versus benign soft tissue lesion such as schwannoma.     Incidental findings:   Slow-growing 2.4 x 0.9 cm left posterior pleural/paraspinal soft tissue structure increased from 1.6 x 0.7 cm on the 12/25/2020 study. I favor an area of pleural thickening versus benign soft tissue lesion such as schwannoma.     Test Results Pending at Discharge (will require follow up):   none     Outpatient Tests Requested:  Endoscopy in 4 weeks  BMP and CBC in 1 week    Complications:  None    Reason for Admission: anemia    Hospital Course:   Isael Avendano is a 68 y.o. male patient who originally presented to the hospital on 2/18/2024 due to shortness of breath with exertion and edema. He was admitted for symptomatic anemia and had transfusions and Egd with banding of varices. During his hospital course his cirrhosis was evaluated and medications were adjusted. He was also evaluated by podiatry for his leg wound. Antibiotics were discontinued. He was treated with IV iron. He was also evaluated by oncology for the incidental finding mentioned above. This will need to be evaluated as an outpatient. After he was adequately diuresed he was discharged home to follow up with his PCP and specialists outpatient. He was counseled on low Na diet, alcohol abstinence and outpatient compliance with appointments and  "daily weights.      Please see above list of diagnoses and related plan for additional information.     Condition at Discharge: stable    Discharge Day Visit / Exam:   Subjective:  denies any new complaints.  Vitals: Blood Pressure: 112/53 (02/28/24 0730)  Pulse: 84 (02/28/24 0730)  Temperature: 98.2 °F (36.8 °C) (02/28/24 0730)  Temp Source: Oral (02/28/24 0730)  Respirations: 16 (02/27/24 2256)  Height: 5' 11\" (180.3 cm) (02/20/24 1306)  Weight - Scale: 108 kg (237 lb 7 oz) (02/28/24 0600)  SpO2: 98 % (02/27/24 2256)  Exam:   Physical Exam  Constitutional:       Appearance: Normal appearance.   HENT:      Head: Normocephalic and atraumatic.      Nose: Nose normal.   Eyes:      Extraocular Movements: Extraocular movements intact.   Pulmonary:      Effort: Pulmonary effort is normal.   Skin:     Comments: Venous stasis dermatitis   Neurological:      Mental Status: He is alert and oriented to person, place, and time.   Psychiatric:         Mood and Affect: Mood normal.         Behavior: Behavior normal.          Discussion with Family: Updated  (wife) at bedside.    Discharge instructions/Information to patient and family:   See after visit summary for information provided to patient and family.      Provisions for Follow-Up Care:  See after visit summary for information related to follow-up care and any pertinent home health orders.      Mobility at time of Discharge:   Basic Mobility Inpatient Raw Score: 23  JH-HLM Goal: 7: Walk 25 feet or more  JH-HLM Achieved: 4: Move to chair/commode  HLM Goal achieved. Continue to encourage appropriate mobility.     Disposition:   Home    Planned Readmission: No     Discharge Statement:  I spent 45 minutes discharging the patient. This time was spent on the day of discharge. I had direct contact with the patient on the day of discharge. Greater than 50% of the total time was spent examining patient, answering all patient questions, arranging and discussing plan " of care with patient as well as directly providing post-discharge instructions.  Additional time then spent on discharge activities.    Discharge Medications:  See after visit summary for reconciled discharge medications provided to patient and/or family.      **Please Note: This note may have been constructed using a voice recognition system**

## 2024-03-01 ENCOUNTER — OFFICE VISIT (OUTPATIENT)
Dept: GASTROENTEROLOGY | Facility: AMBULARY SURGERY CENTER | Age: 69
End: 2024-03-01
Payer: MEDICARE

## 2024-03-01 ENCOUNTER — APPOINTMENT (OUTPATIENT)
Dept: LAB | Facility: CLINIC | Age: 69
End: 2024-03-01
Payer: MEDICARE

## 2024-03-01 VITALS
HEART RATE: 88 BPM | OXYGEN SATURATION: 99 % | SYSTOLIC BLOOD PRESSURE: 136 MMHG | BODY MASS INDEX: 33.1 KG/M2 | HEIGHT: 71 IN | DIASTOLIC BLOOD PRESSURE: 64 MMHG | WEIGHT: 236.4 LBS

## 2024-03-01 DIAGNOSIS — Z12.5 ENCOUNTER FOR SCREENING FOR MALIGNANT NEOPLASM OF PROSTATE: ICD-10-CM

## 2024-03-01 DIAGNOSIS — E11.9 TYPE 2 DIABETES MELLITUS WITHOUT COMPLICATIONS (HCC): ICD-10-CM

## 2024-03-01 DIAGNOSIS — K76.9 LIVER LESION: ICD-10-CM

## 2024-03-01 DIAGNOSIS — E78.2 MIXED HYPERLIPIDEMIA: ICD-10-CM

## 2024-03-01 DIAGNOSIS — K75.81 NASH (NONALCOHOLIC STEATOHEPATITIS): ICD-10-CM

## 2024-03-01 DIAGNOSIS — D61.818 PANCYTOPENIA (HCC): ICD-10-CM

## 2024-03-01 DIAGNOSIS — I85.00 ESOPHAGEAL VARICES DETERMINED BY ENDOSCOPY (HCC): ICD-10-CM

## 2024-03-01 DIAGNOSIS — R18.8 CIRRHOSIS OF LIVER WITH ASCITES, UNSPECIFIED HEPATIC CIRRHOSIS TYPE: Primary | ICD-10-CM

## 2024-03-01 DIAGNOSIS — R93.2 ABNORMAL FINDINGS ON DIAGNOSTIC IMAGING OF LIVER AND BILIARY TRACT: ICD-10-CM

## 2024-03-01 DIAGNOSIS — K74.60 CIRRHOSIS OF LIVER WITH ASCITES, UNSPECIFIED HEPATIC CIRRHOSIS TYPE: Primary | ICD-10-CM

## 2024-03-01 DIAGNOSIS — D52.0 DIETARY FOLATE DEFICIENCY ANEMIA: ICD-10-CM

## 2024-03-01 DIAGNOSIS — F10.91 ALCOHOL USE DISORDER IN REMISSION: ICD-10-CM

## 2024-03-01 DIAGNOSIS — Z11.59 ENCOUNTER FOR SCREENING FOR OTHER VIRAL DISEASES: ICD-10-CM

## 2024-03-01 DIAGNOSIS — N40.0 BENIGN PROSTATIC HYPERPLASIA WITHOUT LOWER URINARY TRACT SYMPTOMS: ICD-10-CM

## 2024-03-01 DIAGNOSIS — I10 ESSENTIAL (PRIMARY) HYPERTENSION: ICD-10-CM

## 2024-03-01 LAB
AFP-TM SERPL-MCNC: 67.67 NG/ML (ref 0–9)
ALBUMIN SERPL BCP-MCNC: 3.2 G/DL (ref 3.5–5)
ALP SERPL-CCNC: 259 U/L (ref 34–104)
ALT SERPL W P-5'-P-CCNC: 52 U/L (ref 7–52)
ANION GAP SERPL CALCULATED.3IONS-SCNC: 7 MMOL/L
AST SERPL W P-5'-P-CCNC: 68 U/L (ref 13–39)
BACTERIA UR QL AUTO: ABNORMAL /HPF
BASOPHILS # BLD AUTO: 0.03 THOUSANDS/ÂΜL (ref 0–0.1)
BASOPHILS NFR BLD AUTO: 1 % (ref 0–1)
BILIRUB SERPL-MCNC: 1.89 MG/DL (ref 0.2–1)
BILIRUB UR QL STRIP: NEGATIVE
BUN SERPL-MCNC: 15 MG/DL (ref 5–25)
CALCIUM ALBUM COR SERPL-MCNC: 10.2 MG/DL (ref 8.3–10.1)
CALCIUM SERPL-MCNC: 9.6 MG/DL (ref 8.4–10.2)
CHLORIDE SERPL-SCNC: 104 MMOL/L (ref 96–108)
CHOLEST SERPL-MCNC: 106 MG/DL
CLARITY UR: CLEAR
CO2 SERPL-SCNC: 30 MMOL/L (ref 21–32)
COLOR UR: ABNORMAL
CREAT SERPL-MCNC: 0.9 MG/DL (ref 0.6–1.3)
EOSINOPHIL # BLD AUTO: 0.37 THOUSAND/ÂΜL (ref 0–0.61)
EOSINOPHIL NFR BLD AUTO: 12 % (ref 0–6)
ERYTHROCYTE [DISTWIDTH] IN BLOOD BY AUTOMATED COUNT: 28.7 % (ref 11.6–15.1)
ERYTHROCYTE [SEDIMENTATION RATE] IN BLOOD: 52 MM/HOUR (ref 0–19)
EST. AVERAGE GLUCOSE BLD GHB EST-MCNC: 117 MG/DL
FERRITIN SERPL-MCNC: 173 NG/ML (ref 24–336)
FOLATE SERPL-MCNC: 12.8 NG/ML
GFR SERPL CREATININE-BSD FRML MDRD: 86 ML/MIN/1.73SQ M
GLUCOSE P FAST SERPL-MCNC: 73 MG/DL (ref 65–99)
GLUCOSE UR STRIP-MCNC: NEGATIVE MG/DL
HBA1C MFR BLD: 5.7 %
HCT VFR BLD AUTO: 35.9 % (ref 36.5–49.3)
HDLC SERPL-MCNC: 32 MG/DL
HGB BLD-MCNC: 10.2 G/DL (ref 12–17)
HGB UR QL STRIP.AUTO: NEGATIVE
IMM GRANULOCYTES # BLD AUTO: 0.01 THOUSAND/UL (ref 0–0.2)
IMM GRANULOCYTES NFR BLD AUTO: 0 % (ref 0–2)
IRON SATN MFR SERPL: 21 % (ref 15–50)
IRON SERPL-MCNC: 62 UG/DL (ref 50–212)
KETONES UR STRIP-MCNC: NEGATIVE MG/DL
LDH SERPL-CCNC: 206 U/L (ref 140–271)
LDLC SERPL CALC-MCNC: 63 MG/DL (ref 0–100)
LEUKOCYTE ESTERASE UR QL STRIP: NEGATIVE
LYMPHOCYTES # BLD AUTO: 0.6 THOUSANDS/ÂΜL (ref 0.6–4.47)
LYMPHOCYTES NFR BLD AUTO: 19 % (ref 14–44)
MCH RBC QN AUTO: 24.5 PG (ref 26.8–34.3)
MCHC RBC AUTO-ENTMCNC: 28.4 G/DL (ref 31.4–37.4)
MCV RBC AUTO: 86 FL (ref 82–98)
MONOCYTES # BLD AUTO: 0.23 THOUSAND/ÂΜL (ref 0.17–1.22)
MONOCYTES NFR BLD AUTO: 7 % (ref 4–12)
MUCOUS THREADS UR QL AUTO: ABNORMAL
NEUTROPHILS # BLD AUTO: 1.86 THOUSANDS/ÂΜL (ref 1.85–7.62)
NEUTS SEG NFR BLD AUTO: 61 % (ref 43–75)
NITRITE UR QL STRIP: NEGATIVE
NON-SQ EPI CELLS URNS QL MICRO: ABNORMAL /HPF
NONHDLC SERPL-MCNC: 74 MG/DL
NRBC BLD AUTO-RTO: 0 /100 WBCS
PH UR STRIP.AUTO: 6.5 [PH]
PLATELET # BLD AUTO: 84 THOUSANDS/UL (ref 149–390)
POTASSIUM SERPL-SCNC: 4 MMOL/L (ref 3.5–5.3)
PROT SERPL-MCNC: 7 G/DL (ref 6.4–8.4)
PROT UR STRIP-MCNC: ABNORMAL MG/DL
PSA SERPL-MCNC: 0.36 NG/ML (ref 0–4)
RBC # BLD AUTO: 4.17 MILLION/UL (ref 3.88–5.62)
RBC #/AREA URNS AUTO: ABNORMAL /HPF
SODIUM SERPL-SCNC: 141 MMOL/L (ref 135–147)
SP GR UR STRIP.AUTO: 1.01 (ref 1–1.03)
TIBC SERPL-MCNC: 300 UG/DL (ref 250–450)
TRIGL SERPL-MCNC: 57 MG/DL
TSH SERPL DL<=0.05 MIU/L-ACNC: 4.28 UIU/ML (ref 0.45–4.5)
UIBC SERPL-MCNC: 238 UG/DL (ref 155–355)
UROBILINOGEN UR STRIP-ACNC: <2 MG/DL
VIT B12 SERPL-MCNC: 1487 PG/ML (ref 180–914)
WBC # BLD AUTO: 3.1 THOUSAND/UL (ref 4.31–10.16)
WBC #/AREA URNS AUTO: ABNORMAL /HPF

## 2024-03-01 PROCEDURE — 82728 ASSAY OF FERRITIN: CPT

## 2024-03-01 PROCEDURE — 86038 ANTINUCLEAR ANTIBODIES: CPT

## 2024-03-01 PROCEDURE — G0103 PSA SCREENING: HCPCS

## 2024-03-01 PROCEDURE — 86430 RHEUMATOID FACTOR TEST QUAL: CPT

## 2024-03-01 PROCEDURE — 85652 RBC SED RATE AUTOMATED: CPT

## 2024-03-01 PROCEDURE — 83550 IRON BINDING TEST: CPT

## 2024-03-01 PROCEDURE — 84443 ASSAY THYROID STIM HORMONE: CPT

## 2024-03-01 PROCEDURE — 99214 OFFICE O/P EST MOD 30 MIN: CPT | Performed by: FAMILY MEDICINE

## 2024-03-01 PROCEDURE — 83615 LACTATE (LD) (LDH) ENZYME: CPT

## 2024-03-01 PROCEDURE — 82105 ALPHA-FETOPROTEIN SERUM: CPT

## 2024-03-01 PROCEDURE — 81001 URINALYSIS AUTO W/SCOPE: CPT

## 2024-03-01 PROCEDURE — 85025 COMPLETE CBC W/AUTO DIFF WBC: CPT

## 2024-03-01 PROCEDURE — 83036 HEMOGLOBIN GLYCOSYLATED A1C: CPT

## 2024-03-01 PROCEDURE — 82607 VITAMIN B-12: CPT

## 2024-03-01 PROCEDURE — 36415 COLL VENOUS BLD VENIPUNCTURE: CPT

## 2024-03-01 PROCEDURE — 83540 ASSAY OF IRON: CPT

## 2024-03-01 PROCEDURE — 82746 ASSAY OF FOLIC ACID SERUM: CPT

## 2024-03-01 PROCEDURE — 80061 LIPID PANEL: CPT

## 2024-03-01 PROCEDURE — 80053 COMPREHEN METABOLIC PANEL: CPT

## 2024-03-01 NOTE — Clinical Note
[facility]ton, can you please add Mr. Avendano to the next liver tumor board?   Dr. Simons, would you willing to present him if I am out on maternity leave at that time?   Clerical team, can we please schedule Mr. Avendano for a 2 month follow-up?   Thank you both in advance!

## 2024-03-01 NOTE — PROGRESS NOTES
[facility] - Outpatient Consultation  Isael Avendano 68 y.o. male MRN: [id number]  Encounter: [id number]          ASSESSMENT AND PLAN:    1. Cirrhosis of liver with ascites, unspecified hepatic cirrhosis type   2. ISABEL (nonalcoholic steatohepatitis)  3. Alcohol use disorder in remission  4. Esophageal varices determined by endoscopy (HCC)  5. Pancytopenia (HCC)  6. Liver lesion    Summary: Patient is a 68 y.o. male with cirrhosis likely secondary to ISABEL and EtOH d/b ascites and nonbleeding esophageal varices. Current MELD-3.0 (15).    Patient was diagnosed with cirrhosis in March 2023 following evaluation for abnormal liver tests in the setting of excessive alcohol consumption and thrombocytopenia for which she underwent an ultrasound notable for nodular hepatic contour and hepatosplenomegaly as well as a US elastography confirming F4 disease. His liver disease has been fairly compensated aside from nonbleeding esophageal varices noted on his most recent EGD (2/20/2024) s/p banding.     He was recently hospitalized (2/20/2024) for abdominal distention and bilateral lower extremity edema s/p paracentesis with fluid analysis c/w portal hypertensive ascites. Contrasted imaging was without obstruction/thrombus and he reports maintained sobriety.  Currently taking Bumex 1 mg once daily and Aldactone 50 mg once daily. Patient reports it appears euvolemic on exam today.    He is also noted to have an uptrending AFP - 15.4 (3/17/2023), 29.68 (9/1/2023), 58.59 (2/21/2024), 67.67 (3/1/2024) - for which a recent MRI with MRCP was limited due to ascites and large body habitus but notable for 2 hepatic segment 4 LI-RADS 3 observations stable in size and nonvisualization of 2 additional hepatic segment 2 and 6 smaller LI-RADS 3 observations seen on prior MRI.  After discussion with attending, will plan to present patient at [facility] tumor board to determine if lesions are amenable to  biopsy.    Otherwise, we will assist patient with scheduling his repeat EGD for serial banding of esophageal varices.  He will have repeat labs drawn x 1 month in order to update his MELD score, evaluate his immunity to hep AMB, assess for AIH given a previously positive LIAN and assess for A1 AT Pi* carrier status. Additional management as below.     Ascites   - S/p paracentesis (2/21) w/ 2000L removed.   - Patient euvolemic on exam today (236 lbs).   - Continue Bumex 1 mg once daily.   - Continue Aldactone 50 mg once daily.   - Repeat CMP drawn today to ensure stable Cr and lytes.   - Encouraged to adhere to a low-sodium (<2000 mg daily) diet.     Esophageal Varices   - EGD (2/20/2024) notable for 3 medium grade 2 varices s/p banding (x4)  - Patient is ordered for a repeat EGD for variceal screening x4-6 weeks for continued banding.   - Assist patient with scheduling in office today.     Hepatic Encephalopathy   - No history or evidence of HE on exam today.   - Patient aware of signs/sxs's of HE and advised to promptly inform provider if experiencing such.    HCC Screening   - MRI (2/19/2024) limited due to ascites and large body habitus notable for 2 hepatic segment 4 LI-RADS 3 observations stable in size and nonvisualization of 2 additional hepatic segment 2 and 6 smaller LI-RADS 3 observations seen on prior MRI.  - Patient also with an uptrending AFP; 5.4 (3/17/2023), 29.68 (9/1/2023), 58.59 (2/21/2024)  - Discuss with attending if biopsy is warranted given up-trending AFP.  - If not, will plan for repeat MR abdomen with MRCP and AFP  levels x3 months.     Nutritional Status  - No sarcopenia noted one xam today   - Encouraged high-protein diet in addition to a high-protein snack qHS to prevent prolonged fasting.     Vaccines   - Serologies to assess for immunity to hep A and B.     - Ambulatory Referral to Hepatology  - Alpha 1 Antitrypsin Phenotype; Future  - LIAN Screen w/ Reflex to Titer/Pattern; Future  -  Antimitochondrial antibody; Future  - Anti-smooth muscle antibody, IgG; Future  - IgG, IgA, IgM; Future  - Hepatitis A antibody, total; Future  - Hepatitis B surface antibody; Future  - CBC and differential; Future  - Comprehensive metabolic panel; Future  - Protime-INR; Future    Follow-up in 2 months or sooner if necessary.   ______________________________________________________________________    HPI: Patient is a 68 y.o. male with PMH significant for DM2, MEG, history of HTN off antihypertensives, CKD3, pancytopenia and alcohol use disorder in remission who presents today for a consultation regarding cirrhosis.    Patient is familiar to [facility] gastroenterology previously following with Dr. De La Garza for management of cirrhosis. He was referred for evaluation of abnormal liver tests in the setting of excessive alcohol consumption. He was also seen to have profound thrombocytopenia. He had a partial serologic evaluation which showed normal transaminases, anemia with borderline iron deficiency, a positive LIAN and an elevated AFP (15). He also had an ultrasound which noted nodular hepatic contour c/f cirrhosis, hepatosplenomegaly and mild steatosis with a subsequent US elastography noting F4 disease (cirrhosis).    He had an EGD (12/19/2023) for variceal screening which noted multiple medium grade 2 esophageal varices and PHG for which he was neither banded or put on an NSBB. He was also noted to have a rising AFP - 15.4 (3/17/2023), 29.68 (9/1/2023) - prompting an MRI with MRCP in October 2023 notable for three LI-RADS 3 observations in segment 4B, segment 2 and segment 6. Otherwise, his liver disease was compensated.    He was recently hospitalized (2/20/2024) for abdominal distention and b/l ANY attributed to ascites s/p paracentesis with fluid analysis c/w portal hypertensive ascites.  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 He also had a repeat AFP which continues to uptrend (58.59) and an MRI which was unfortunately limited due to ascites and large body habitus but notable for 2 hepatic segment 4 LI-RADS 3 observations stable in size and nonvisualization of 2 additional hepatic segment 2 and 6 smaller LI-RADS 3 observations seen on prior MRI.    Today, states that his ascites is well-controlled on his current diuretic regimen. In regards to his alcohol use, he was previously drinking 2-3 double shots of rum nightly but his last drink was in August 2022.    Colonoscopy (12/19/2023) notable for one 15 mm colonic polyp in the sigmoid colon and small grade 1 varices. Recommended repeating colonoscopy x 3 years.     MELD 3.0: 15 at 2/22/2024  5:36 AM  MELD-Na: 14 at 2/22/2024  5:36 AM  Calculated from:  Serum Creatinine: 0.85 mg/dL (Using min of 1 mg/dL) at 2/22/2024  5:36 AM  Serum Sodium: 136 mmol/L at 2/22/2024  5:36 AM  Total Bilirubin: 2.25 mg/dL at 2/22/2024  5:36 AM  Serum Albumin: 2.7 g/dL at 2/22/2024  5:36 AM  INR(ratio): 1.42 at 2/20/2024  9:02 AM  Age at listing (hypothetical): 68 years  Sex: Male at 2/22/2024  5:36 AM      REVIEW OF SYSTEMS:    CONSTITUTIONAL: Denies any fever, chills, rigors, and weight loss.  HEENT: No earache or tinnitus. Denies hearing loss or visual disturbances.  CARDIOVASCULAR: No chest pain or palpitations.   RESPIRATORY: Denies any cough, hemoptysis, shortness of breath or dyspnea on exertion.  GASTROINTESTINAL: As noted in the History of Present Illness.   GENITOURINARY: No problems with urination. Denies any hematuria or dysuria.  NEUROLOGIC: No dizziness or vertigo, denies headaches.   MUSCULOSKELETAL: Denies any muscle or joint pain.   SKIN: Denies skin rashes or itching.   ENDOCRINE: Denies excessive thirst. Denies intolerance to heat or cold.  PSYCHOSOCIAL: Denies depression or anxiety. Denies any recent memory loss.       Historical Information   Past Medical  History:   Diagnosis Date   • Arrhythmia    • Chronic kidney disease    • COVID 12/2020   • CPAP (continuous positive airway pressure) dependence    • Diabetes mellitus (HCC)    • History of echocardiogram 11/14/2017    showed EF of 50-55 percentWith moderate LVH and left ventricle diastolic dysfunction. Left atrium was moderately enlarged. Trace MR noted.    • History of Holter monitoring 11/21/2017    showed baseline rhythm of sinus origin with an average heart it of 61 bpm. The lowest heart rate was 49 and the highest heart rate was 10 8 bpm. There were rare single VPCs, and frequent PACs representing 3.2% of total beats. There were several episodes of sinus arrhythmias with sinus bradycardia and heart rate ranging from 40-90 bpm. No sustained dysrhythmias, or pauses noted. The patient did not   • History of transfusion 04/2023    platelets   • Liver disease     cirhosis   • Murmur, cardiac    • Obese    • Sleep apnea      Past Surgical History:   Procedure Laterality Date   • CATARACT EXTRACTION     • EYE SURGERY Left 1997   • FL GUIDED NEEDLE PLAC BX/ASP/INJ  8/28/2023   • HERNIA REPAIR  [id number]   • IR PARACENTESIS  2/19/2024   • IR PARACENTESIS  2/21/2024   • JOINT REPLACEMENT Right     TKR   • KNEE ARTHROPLASTY Right 2008   • HI ARTHROPLASTY GLENOHUMERAL JOINT TOTAL SHOULDER Left 04/04/2023    Procedure: ARTHROPLASTY SHOULDER REVERSE;  Surgeon: Marcelo Lester MD;  Location: [facility];  Service: Orthopedics   • HI JARED SHOULDER ARTHRPLSTY HUMERAL&GLENOID COMPNT Left 9/8/2023    Procedure: removal of antibiotic spacer, incision and debridement,  with revision reverse shoulder arthroplasty;  Surgeon: Lita Aguilar;  Location:  MAIN OR;  Service: Orthopedics   • HI JARED SHOULDER ARTHRPLSTY HUMERAL/GLENOID COMPNT Left 06/13/2023    Procedure: ARTHROPLASTY SHOULDER REVISION;  Surgeon: Lita Aguilar;  Location: [facility];  Service: Orthopedics   • SHOULDER SURGERY Right 2002   • WOUND DEBRIDEMENT  "Left 2023    Procedure: INCISION AND DRAINAGE (I&D) EXTREMITY, vac placement;  Surgeon: Marcelo Lester MD;  Location: [facility];  Service: Orthopedics     Social History   Social History     Substance and Sexual Activity   Alcohol Use Not Currently    Comment: quit      Social History     Substance and Sexual Activity   Drug Use Never     Social History     Tobacco Use   Smoking Status Former   • Current packs/day: 0.00   • Average packs/day: 0.5 packs/day for 20.0 years (10.0 ttl pk-yrs)   • Types: Cigarettes   • Start date:    • Quit date:    • Years since quittin.1   Smokeless Tobacco Never     Family History   Problem Relation Age of Onset   • Diabetes Mother    • Supraventricular tachycardia Mother    • COPD Father    • Heart disease Father    • Other Father         Sepsis; related to UTI with complicating cardiac problems   • Heart disease Paternal Grandmother    • Prostate cancer Paternal Grandfather 82       Meds/Allergies       Current Outpatient Medications:   •  bumetanide (BUMEX) 1 mg tablet  •  fluticasone (FLONASE) 50 mcg/act nasal spray  •  glimepiride (AMARYL) 4 mg tablet  •  Insulin Pen Needle (BD Pen Needle Nayla 2nd Gen) 32G X 4 MM MISC  •  Lantus SoloStar 100 units/mL SOPN  •  Multiple Vitamin (multivitamin) capsule  •  potassium chloride (Klor-Con M20) 20 mEq tablet  •  spironolactone (ALDACTONE) 50 mg tablet  •  traZODone (DESYREL) 50 mg tablet  •  tamsulosin (FLOMAX) 0.4 mg    Allergies   Allergen Reactions   • Sulfa Antibiotics Hives   • Daptomycin Other (See Comments)     Possibly contributed to ABILIO on 2023 admission            Objective     Blood pressure 136/64, pulse 88, height 5' 11\" (1.803 m), weight 107 kg (236 lb 6.4 oz), SpO2 99%. Body mass index is 32.97 kg/m².        PHYSICAL EXAM:      General Appearance:   Alert, cooperative, no distress; AAOx3, no asterixis   HEENT:   Normocephalic, atraumatic, anicteric; No scleral icterus     Neck:  Supple, " symmetrical, trachea midline   Lungs:   Clear to auscultation bilaterally; no rales, rhonchi or wheezing; respirations unlabored    Heart::   Regular rate and rhythm; no murmur, rub, or gallop.   Abdomen:   Soft, non-tender, non-distended; normal bowel sounds; no masses, no organomegaly    Genitalia:   Deferred    Rectal:   Deferred    Extremities:  No palmar erythema, cyanosis, clubbing or edema    Pulses:  2+ and symmetric    Skin:  No spider angiomas, jaundice, rashes, or lesions    Lymph nodes:  No palpable cervical lymphadenopathy        Lab Results:   Appointment on 03/01/2024   Component Date Value   • WBC 03/01/2024 3.10 (L)    • RBC 03/01/2024 4.17    • Hemoglobin 03/01/2024 10.2 (L)    • Hematocrit 03/01/2024 35.9 (L)    • MCV 03/01/2024 86    • MCH 03/01/2024 24.5 (L)    • MCHC 03/01/2024 28.4 (L)    • RDW 03/01/2024 28.7 (H)    • Platelets 03/01/2024 84 (L)    • nRBC 03/01/2024 0    • Neutrophils Relative 03/01/2024 61    • Immat GRANS % 03/01/2024 0    • Lymphocytes Relative 03/01/2024 19    • Monocytes Relative 03/01/2024 7    • Eosinophils Relative 03/01/2024 12 (H)    • Basophils Relative 03/01/2024 1    • Neutrophils Absolute 03/01/2024 1.86    • Immature Grans Absolute 03/01/2024 0.01    • Lymphocytes Absolute 03/01/2024 0.60    • Monocytes Absolute 03/01/2024 0.23    • Eosinophils Absolute 03/01/2024 0.37    • Basophils Absolute 03/01/2024 0.03    • Sodium 03/01/2024 141    • Potassium 03/01/2024 4.0    • Chloride 03/01/2024 104    • CO2 03/01/2024 30    • ANION GAP 03/01/2024 7    • BUN 03/01/2024 15    • Creatinine 03/01/2024 0.90    • Glucose, Fasting 03/01/2024 73    • Calcium 03/01/2024 9.6    • Corrected Calcium 03/01/2024 10.2 (H)    • AST 03/01/2024 68 (H)    • ALT 03/01/2024 52    • Alkaline Phosphatase 03/01/2024 259 (H)    • Total Protein 03/01/2024 7.0    • Albumin 03/01/2024 3.2 (L)    • Total Bilirubin 03/01/2024 1.89 (H)    • eGFR 03/01/2024 86    • Vitamin B-12 03/01/2024 1,487 (H)     • Folate 03/01/2024 12.8    • AFP TUMOR MARKER 03/01/2024 67.67 (H)    • Antinuclear Antibodies, * 03/01/2024 Positive (A)    • Rheumatoid Factor 03/01/2024 Negative    • Cholesterol 03/01/2024 106    • Triglycerides 03/01/2024 57    • HDL, Direct 03/01/2024 32 (L)    • LDL Calculated 03/01/2024 63    • Non-HDL-Chol (CHOL-HDL) 03/01/2024 74    • Hemoglobin A1C 03/01/2024 5.7 (H)    • EAG 03/01/2024 117    • PSA 03/01/2024 0.36    • Sed Rate 03/01/2024 52 (H)    • TSH 3RD GENERATON 03/01/2024 4.279    • LD 03/01/2024 206    • Color, UA 03/01/2024 Light Yellow    • Clarity, UA 03/01/2024 Clear    • Specific Gravity, UA 03/01/2024 1.010    • pH, UA 03/01/2024 6.5    • Leukocytes, UA 03/01/2024 Negative    • Nitrite, UA 03/01/2024 Negative    • Protein, UA 03/01/2024 Trace (A)    • Glucose, UA 03/01/2024 Negative    • Ketones, UA 03/01/2024 Negative    • Urobilinogen, UA 03/01/2024 <2.0    • Bilirubin, UA 03/01/2024 Negative    • Occult Blood, UA 03/01/2024 Negative    • RBC, UA 03/01/2024 2-4 (A)    • WBC, UA 03/01/2024 4-10 (A)    • Epithelial Cells 03/01/2024 Occasional    • Bacteria, UA 03/01/2024 None Seen    • MUCUS THREADS 03/01/2024 Occasional (A)    • Iron Saturation 03/01/2024 21    • TIBC 03/01/2024 300    • Iron 03/01/2024 62    • UIBC 03/01/2024 238    • Ferritin 03/01/2024 173    • Homogeneous Pattern 03/01/2024 1:320 (H)    • Note: 03/01/2024 Comment          Radiology Results:   CT chest w contrast    Result Date: 2/26/2024  Narrative: CT CHEST WITH IV CONTRAST INDICATION: cough. COMPARISON: CTA chest PE study 12/25/2020 TECHNIQUE: CT examination of the chest was performed. Multiplanar 2D reformatted images were created from the source data. This examination, like all CT scans performed in the [facility], was performed utilizing techniques to minimize radiation dose exposure, including the use of iterative reconstruction and automated exposure control. Radiation dose length product  (DLP) for this visit: 470.71 mGy-cm IV Contrast: 100 mL of iohexol (OMNIPAQUE) FINDINGS: LUNGS: No acute pulmonary findings. There is no tracheal or endobronchial lesion. PLEURA: Small left pleural effusion. 2.4 x 0.9 cm medially located posterior pleural/paraspinal lesion #2/105 unchanged from the recent comparison MRI and likely stable since CT from 6/20/2023. This measured 1.6 x 0.7 cm on the 12/25/2020 CTA chest study. This lesion does show mild enhancement prior MRI subtraction images for example #83326/[id number]. No erosion of the underlying left 10th rib or adjacent transverse process. HEART/GREAT VESSELS: Cardiomegaly. Coronary artery calcifications. Dense mitral annulus and mild aortic annulus calcifications. Fusiform ectasia of the ascending thoracic aorta measuring up to 40 mm. Recommendation is for follow-up low radiation dose chest CT in one year. MEDIASTINUM AND JEWEL: Small hiatal hernia. Probable small paraesophageal varices. CHEST WALL AND LOWER NECK: Unremarkable. VISUALIZED STRUCTURES IN THE UPPER ABDOMEN: No new findings. Reidentified hepatic cirrhosis splenomegaly and cholelithiasis. OSSEOUS STRUCTURES: No acute fracture or destructive osseous lesion.     Impression: Slow-growing 2.4 x 0.9 cm left posterior pleural/paraspinal soft tissue structure increased from 1.6 x 0.7 cm on the 12/25/2020 study. I favor an area of pleural thickening versus benign soft tissue lesion such as schwannoma. Workstation performed: [id number]      INPATIENT Paracentesis    Result Date: 2/26/2024  Narrative: PROCEDURE: Ultrasound paracentesis STAFF: Evelyn Willard PA-C. COMPLICATIONS: None immediately. MEDICATIONS: 1% lidocaine subcutaneous. INDICATION: Symptomatic ascites. PROCEDURE: Using ultrasound guidance, the right paracolic gutter was punctured and a catheter was placed into the peritoneal cavity. A total of 2000 milliliters of clear yellow fluid was removed. The catheter was then removed. Labs collected and sent  FINDINGS: 2000 mL of clear yellow ascites was removed.     Impression: Ultrasound-guided right paracentesis. Signed, performed, and dictated by Evelyn Willard PA-C under the direct supervision of Dr. Belia García. Workstation performed: [id number]     MRI foot/forefoot toes left w wo contrast    Result Date: 2/21/2024  Narrative: MRI FOOT/FOREFOOT TOES LEFT W WO CONTRAST INDICATION:   Osteomyelitis (pending surgical decision). COMPARISON: Plain film 2/19/2024. TECHNIQUE:  Multiplanar/multisequence MR of the left foot was performed both pre and post IV contrast. IV Contrast:  11 mL of Gadobutrol injection (SINGLE-DOSE) FINDINGS: Motion degradation considerably limits assessment. SUBCUTANEOUS TISSUES: Skin ulceration lateral aspect of the fourth digit with associated subcutaneous edema indicating cellulitis. No abscess collection identified. Diffuse forefoot subcutaneous edema indicating nonspecific cellulitis. BONES: Increased T2 signal identified throughout the fourth proximal phalanx with T1 replacement noted at the mid diaphysis and head of the bone concerning for osteomyelitis. There is corresponding post gadolinium enhancement involving the fourth proximal phalanx as well. Midfoot degenerative change with mild pes planus deformity suggestive of neuropathic changes. FIRST MTP JOINT: Prominent degenerative change with hallux rigidus deformity noted. SESAMOID BONES:  Intact. OTHER ARTICULAR SURFACES:  Normal. PLANTAR FASCIA:  Intact. LISFRANC LIGAMENT:  Intact. FOREFOOT TENDONS:  Intact. INTERMETATARSAL REGIONS:  No bursitis or Hedrick's neuroma. MUSCULATURE: Intact.     Impression: 1. Skin ulceration lateral aspect of the fourth proximal toe with marrow changes fourth proximal phalanx including T1 replacement signal and post gadolinium enhancement concerning for osteomyelitis. 2. Prominent first MTP degenerative change with hallux rigidus. The study was marked in EPIC for immediate notification. Workstation  performed: [id number]     EGD    Result Date: 2/20/2024  Narrative: Table formatting from the original result was not included. [facility] [address] [place] [address] [phone] DATE OF SERVICE: 2/20/24 PHYSICIAN(S): Attending: Geraldine Ulloa MD Fellow: DO Andrew Draper MD INDICATION: Anemia POST-OP DIAGNOSIS: See the impression below. PREPROCEDURE: Informed consent was obtained for the procedure, including sedation.  Risks of perforation, hemorrhage, adverse drug reaction and aspiration were discussed. The patient was placed in the left lateral decubitus position. Patient was explained about the risks and benefits of the procedure. Risks including but not limited to bleeding, infection, and perforation were explained in detail. Also explained about less than 100% sensitivity with the exam and other alternatives. PROCEDURE: EGD DETAILS OF PROCEDURE: Patient was taken to the procedure room where a time out was performed to confirm correct patient and correct procedure. The patient underwent monitored anesthesia care, which was administered by an anesthesia professional. The patient's blood pressure, heart rate, level of consciousness, respirations, oxygen and ETCO2 were monitored throughout the procedure. The scope was introduced through the mouth and advanced to the second part of the duodenum. Retroflexion was performed in the fundus. The patient experienced no blood loss. The procedure was not difficult. The patient tolerated the procedure well. There were no apparent adverse events. 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 RECOMMENDATION:  Schedule repeat EGD  Abnormal pathology    Geraldine Ulloa MD     Echo complete w/ contrast if indicated    Result Date: 2/20/2024  Narrative: •  Left Ventricle: Left ventricular cavity size is normal. Wall thickness is mildly increased. There is mild concentric hypertrophy. The left ventricular ejection fraction is 65%. Systolic function is normal. Wall motion is normal. Diastolic function is moderately abnormal, consistent with grade II (pseudonormal) relaxation. •  Right Ventricle: Right ventricular cavity size is mildly dilated. Systolic function is normal. •  Left Atrium: The atrium is severely dilated (>48 mL/m2). •  Right Atrium: The atrium is moderately dilated. •  Aortic Valve: The aortic valve velocity is increased due to increased flow. •  Mitral Valve: There is moderate annular calcification. There is no evidence of stenosis. The mitral valve mean gradient is increased due to flow. •  Tricuspid Valve: There is mild to moderate regurgitation. The right ventricular systolic pressure is mildly elevated. The estimated right ventricular systolic pressure is 35.00 mmHg. •  Aorta: The aortic root is normal in size. The ascending aorta is mildly dilated at 4.2 cm/1.8 cm/m2.     MRI abdomen w wo contrast and mrcp    Result Date: 2/20/2024  Narrative: MRI OF THE ABDOMEN WITH AND WITHOUT CONTRAST WITH MRCP INDICATION: 68 years / Male. Follow up LI-RADS 3 lesions. COMPARISON: MRI abdomen 10/23/2023 TECHNIQUE: Multiplanar/multisequence MRI of the abdomen with 3D MRCP was performed before and after administration of contrast. Imaging performed on 1.5T MRI. IV Contrast: 10 mL of Gadobutrol injection  "(SINGLE-DOSE) FINDINGS: Evaluation is suboptimal in presence of ascites due to dielectric artifact and due to large body habitus. LOWER CHEST: Small bilateral pleural effusions. There is a 2.4 x 0.6 cm T1 hyperintense lesion along the left chest wall (series 12 image 13) with mild postcontrast enhancement (series 15 image 28). It was likely present on prior CT 12/19/2023 but not visualized on MRI abdomen 10/23/2023 LIVER: Cirrhotic morphology. The liver is normal in size on current examination. Examination for focal hepatic lesions is markedly suboptimal due to factors described above observation: 1 Size: 1.8 x 1.4 cm series 13 image 41, previously 1.8 x 1.5 cm Location: Segment 4b Enhancement: Nonrim arterial phase hyperenhancement. Nonperipheral \"washout\": Not evaluable Enhancing \"capsule\": No Threshold growth: No LI-RADS Category: LR-3.. Previously seen observation #2 measuring 0.9 cm in segment 2 and #3 measuring 1.0 cm in segment 6 are not visualized on current suboptimal examination, likely secondary to their smaller size observation: 2 Size: 1.8 x 1.3 series 13 image 18, previously 1.9 x 1.3 cm Location: Segment 4a Enhancement: Nonrim arterial phase hyperenhancement. Nonperipheral \"washout\": No Enhancing \"capsule\": No Threshold growth: No LI-RADS Category: LR-3.. Patent hepatic and portal veins. BILE DUCTS: The study was marked in EPIC for significant notification. Intrahepatic biliary ductal dilation. Extrahepatic bile duct is not visualized on provided nondiagnostic MRCP images or the known MRCP images, limiting evaluation.. GALLBLADDER: Cholelithiasis without findings of acute cholecystitis. PANCREAS: Unremarkable. ADRENAL GLANDS: Unremarkable. SPLEEN: Significant splenomegaly measuring up to 19 cm. There are 2 subcentimeter lesions within the spleen with arterial hyperenhancement (series 13 image 190). These appear to be isointense on delayed postcontrast images. These are not definitely identified on " precontrast T1 and T2-weighted images. Findings can represent either perfusional variants or small hemangiomas. KIDNEYS/PROXIMAL URETERS: No hydroureteronephrosis. No suspicious renal mass. BOWEL: No dilated loops of bowel. PERITONEUM/RETROPERITONEUM: Small volume ascites. Nondiagnostic assessment of the retroperitoneum and central abdominal cavity due to dielectric artifact LYMPH NODES: No abdominal lymphadenopathy. VESSELS: No aneurysm. Partially visualized is fat stranding around the origin of the inferior mesenteric artery, as also demonstrated on prior CT 12/19/2023. ABDOMINAL WALL: Small umbilical hernia containing fat and ascitic fluid. BONES: No suspicious osseous lesion.     Impression: Markedly limited examination due to dielectric artifact from presence of ascites and other patient related factors. Grossly stable size of  two hepatic segment 4 LI-RADS 3 observations which are incompletely characterized on current examination. Nonvisualization of additional 2 smaller LI-RADS 3 observations in hepatic segments 2 and 6 respectively, as seen on prior MRI abdomen 10/23/2023. Follow-up MRI abdomen with and without contrast in 3 months is recommended for better assessment Cirrhosis, splenomegaly and ascites, compatible with portal hypertension. Main portal vein is patent New small bilateral pleural effusions A 2.4 x 0.6 cm T1 hyperintense enhancing lesion along the left chest wall, new since MRI abdomen 10/23/2023 and likely present on CT 12/19/2023, likely representing reactive lymph node and less likely a nerve sheath tumor such as schwannoma. Attention on  follow-up imaging is recommended to assess for stability/resolution. 2 subcentimeter incompletely characterized splenic lesions with postcontrast hyperenhancement and hypoenhancement on delayed images. These may represent perfusional variants or small hemangiomas. Attention on follow-up imaging is recommended for better characterization. The study was  marked in EPIC for significant notification. Workstation performed: [id number]     XR foot 3+ vw left    Result Date: 2/20/2024  Narrative: XR FOOT 3+ VW LEFT INDICATION: r/o OM. COMPARISON: 11/19/2023 FINDINGS: No acute fracture or dislocation. Stable chronic subluxation dislocation at the second and fifth MTP joint. Hallux valgus, metatarsus varus deformity. Degenerative changes in the first metatarsophalangeal joint. There is focal osteopenia and poor cortical definition of the fifth middle and distal phalanx. Cannot exclude osteomyelitis. Consider MRI for further evaluation. Forefoot soft tissue swelling.     Impression: There is focal osteopenia and poor cortical definition of the fifth middle and distal phalanx. Cannot exclude osteomyelitis. Consider MRI for further evaluation. The study was marked in EPIC for significant notification. Workstation performed: [id number]     IR INPATIENT Paracentesis    Result Date: 2/19/2024  Narrative: Ultrasound-guided paracentesis Clinical History: Abdominal ascites Procedure: After explaining the risks and benefits of the procedure to the patient, informed consent was obtained. Ultrasound used to localize the right lower quadrant ascites. The overlying skin was prepped and draped in usual sterile fashion and local anesthesia was obtained with the 1% lidocaine solution. A 5 Bulgarian Yueh needle was advanced until fluid was aspirated under live ultrasound guidance. Approximately 1800 cc' s of cloudy, yellow fluid was aspirated. Specimens obtained/sent The patient tolerated the procedure well and left the department in stable condition.     Impression: Impression: Ultrasound-guided right paracentesis. Signed, performed, and dictated by AMOR Rueda under the supervision of Dr. Looney. Workstation performed: [id number]      VAS VENOUS DUPLEX - LOWER LIMB BILATERAL    Result Date: 2/19/2024  Narrative:  [facility] REPORT CLINICAL: Indications: Patient presents  with bilateral lower extremity swelling. Operative History: No prior cardiovascular surgeries Risk Factors The patient has history of Obesity, HTN, Diabetes (IDDM) and previous smoking (quit >10yrs ago).   CONCLUSION:  Impression:  RIGHT LOWER LIMB: No evidence of acute or chronic deep vein thrombosis. No evidence of superficial thrombophlebitis noted. Doppler evaluation shows a normal response to augmentation maneuvers.. Popliteal, posterior tibial and anterior tibial arterial Doppler waveform's are triphasic.  LEFT LOWER LIMB: No evidence of acute or chronic deep vein thrombosis. No evidence of superficial thrombophlebitis noted. Doppler evaluation shows a normal response to augmentation maneuvers. Popliteal, posterior tibial and anterior tibial arterial Doppler waveform's are triphasic.  Tech Note: There are two echogenic structures located in the bilateral inguinal region measuring approximately 2.14 cm on the right and 3.56 cm on the left suggestive of enlarged lymphatic channels.  Tech note: A portion of the study was performed by current DMS student under direct supervision from a registered senior vascular technologist with approval from patient.  SIGNATURE: Electronically Signed by: LOUIE CASAS MD, RPVI on 2024-02-19 01:00:45 PM    XR chest 2 views    Result Date: 2/19/2024  Narrative: XR CHEST PA & LATERAL INDICATION: Dyspnea.. COMPARISON: 12/20/2023 FINDINGS: Pulmonary vascular congestion. Small pleural effusions. No pneumothorax. Enlarged cardiac silhouette, unchanged. No acute osseous abnormality within the limitations of portable radiography. Left reverse shoulder arthroplasty. Normal upper abdomen.     Impression: Pulmonary vascular congestion. Small pleural effusions. Enlarged cardiac silhouette. Findings concur with the preliminary report by the referring clinician already in PACS and/or our electronic record EPIC. Workstation performed: [id number]      bedside procedure    Result Date:  2/19/2024  Narrative: [id number]      Tianna Pimentel PA-C     **Please note: Dictation voice to text software may have been used in the creation of this record. Occasional wrong word or “sound alike” substitutions may have occurred due to the inherent limitations of voice recognition software. Read the chart carefully and recognize, using context, where substitutions have occurred.**

## 2024-03-01 NOTE — PATIENT INSTRUCTIONS
Scheduled date of EGD(as of today): March 21, 2024  Physician performing EGD: Dr. Howell  Location of EGD: [place]  Instructions reviewed with patient by: Dari/ Mahsa CAST  Clearances: Diabetic Lantos/Glimepiride  Glyxambi ( PT states not taking)

## 2024-03-02 LAB
ANA HOMOGEN TITR SER: ABNORMAL {TITER}
ANA TITR SER IF: POSITIVE {TITER}
RHEUMATOID FACT SER QL LA: NEGATIVE
SL AMB NOTE:: ABNORMAL

## 2024-03-04 ENCOUNTER — TELEPHONE (OUTPATIENT)
Age: 69
End: 2024-03-04

## 2024-03-04 ENCOUNTER — DOCUMENTATION (OUTPATIENT)
Dept: HEMATOLOGY ONCOLOGY | Facility: CLINIC | Age: 69
End: 2024-03-04

## 2024-03-04 ENCOUNTER — TELEPHONE (OUTPATIENT)
Dept: GASTROENTEROLOGY | Facility: CLINIC | Age: 69
End: 2024-03-04

## 2024-03-04 ENCOUNTER — TELEPHONE (OUTPATIENT)
Dept: HEMATOLOGY ONCOLOGY | Facility: CLINIC | Age: 69
End: 2024-03-04

## 2024-03-04 NOTE — TELEPHONE ENCOUNTER
Hello,  Please advise if the following patient can be forced onto the schedule:    Patient: Isael Avendano    : [birth year]    MRN: [id number]    Call back #: [phone]    Insurance: Medicare    Reason for appointment: one week follow up from hospital visit ()    Requested doctor/location: [facility]      Thank you.

## 2024-03-04 NOTE — TELEPHONE ENCOUNTER
----- Message from Akilah Moses sent at 2/26/2024 10:19 AM EST -----  Please f/u on postponed message, thanks!   ----- Message -----  From: Andrew Mcdaniel MD  Sent: 2/20/2024   4:24 PM EST  To: #    Hi,    Can you please help the patient set up a repeat in 4-6 weeks.       Thank you.    Andrew

## 2024-03-04 NOTE — PROGRESS NOTES
In-basket message received from Tianna Pimentel PA-C to add patient to the liver [facility] on 3/8/2024. Chart reviewed and prep completed.

## 2024-03-04 NOTE — TELEPHONE ENCOUNTER
Patient Call    Who are you speaking with? Spouse    If it is not the patient, are they listed on an active communication consent form? N/A   What is the reason for this call? Patient wife wanted to schedule pt for hem but I advised he is scheduled with [facility], She asked if they need to schedule with the other doctors that was requested as she didn't really want it scheduled, I advised her its not monitor but they do suggest he be seen by the doctors who are referred.    Does this require a call back? No   If a call back is required, please list best call back number N/A   If a call back is required, advise that a message will be forwarded to their care team and someone will return their call as soon as possible.   Did you relay this information to the patient? Yes

## 2024-03-05 ENCOUNTER — OFFICE VISIT (OUTPATIENT)
Dept: PODIATRY | Facility: CLINIC | Age: 69
End: 2024-03-05
Payer: MEDICARE

## 2024-03-05 VITALS
DIASTOLIC BLOOD PRESSURE: 75 MMHG | WEIGHT: 236 LBS | BODY MASS INDEX: 33.04 KG/M2 | HEIGHT: 71 IN | SYSTOLIC BLOOD PRESSURE: 129 MMHG | HEART RATE: 90 BPM

## 2024-03-05 DIAGNOSIS — E11.40 TYPE 2 DIABETES MELLITUS WITH DIABETIC NEUROPATHY, WITH LONG-TERM CURRENT USE OF INSULIN (HCC): ICD-10-CM

## 2024-03-05 DIAGNOSIS — Z79.4 TYPE 2 DIABETES MELLITUS WITH DIABETIC NEUROPATHY, WITH LONG-TERM CURRENT USE OF INSULIN (HCC): ICD-10-CM

## 2024-03-05 DIAGNOSIS — L97.522 ULCER OF TOE, LEFT, WITH FAT LAYER EXPOSED (HCC): Primary | ICD-10-CM

## 2024-03-05 PROCEDURE — 99213 OFFICE O/P EST LOW 20 MIN: CPT | Performed by: PODIATRIST

## 2024-03-05 NOTE — PROGRESS NOTES
Patient ID: Isael Avendano is a 68 y.o. male Date of Birth 1955       Assessment:    No problem-specific Assessment & Plan notes found for this encounter.       Diagnoses and all orders for this visit:    Ulcer of toe, left, with fat layer exposed (HCC)  -     XR toe left fourth min 2 views; Future    Type 2 diabetes mellitus with diabetic neuropathy, with long-term current use of insulin (HCC)            Procedures    Plan:  1. Reviewed medical records.  Reviewed images from the last admission.      2. Wound is stable and granular without signs of infection.  Wound is little larger since he walks quite a bit since d/c from hospital.  Stressed on patient compliance about proper off-loading.   3. Repeat X-ray in 1 week.    4. Silver alginate and DSD to left 4th toe ulcer.  5. Consider amputation if wound fails to heal or gets worse in the next few weeks.  6. Continue orthowedge shoe.  Pt to call the office if any signs of infection develop or significant change in appearance of wound.  7. He will return in 1 week.       Subjective:      .  HPI    Isael presents for evaluation of left 4th toe ulcer.  He feels well.  Wound looks stable.  No redness or swelling.  No pain.  He does not stay off of his foot after discharged from the hospital.  BS under control.        The following portions of the patient's history were reviewed and updated as appropriate: allergies, current medications, past family history, past medical history, past social history, past surgical history and problem list.      PAST MEDICAL HISTORY:  Past Medical History:   Diagnosis Date    Arrhythmia     Chronic kidney disease     COVID 12/2020    CPAP (continuous positive airway pressure) dependence     Diabetes mellitus (HCC)     History of echocardiogram 11/14/2017    showed EF of 50-55 percentWith moderate LVH and left ventricle diastolic dysfunction. Left atrium was moderately enlarged. Trace MR noted.     History of Holter monitoring 11/21/2017     showed baseline rhythm of sinus origin with an average heart it of 61 bpm. The lowest heart rate was 49 and the highest heart rate was 10 8 bpm. There were rare single VPCs, and frequent PACs representing 3.2% of total beats. There were several episodes of sinus arrhythmias with sinus bradycardia and heart rate ranging from 40-90 bpm. No sustained dysrhythmias, or pauses noted. The patient did not    History of transfusion 04/2023    platelets    Liver disease     cirhosis    Murmur, cardiac     Obese     Sleep apnea        PAST SURGICAL HISTORY:  Past Surgical History:   Procedure Laterality Date    CATARACT EXTRACTION      EYE SURGERY Left 1997    FL GUIDED NEEDLE PLAC BX/ASP/INJ  8/28/2023    HERNIA REPAIR  [id number]    IR PARACENTESIS  2/19/2024    IR PARACENTESIS  2/21/2024    JOINT REPLACEMENT Right     TKR    KNEE ARTHROPLASTY Right 2008    MT ARTHROPLASTY GLENOHUMERAL JOINT TOTAL SHOULDER Left 04/04/2023    Procedure: ARTHROPLASTY SHOULDER REVERSE;  Surgeon: Marcelo Lester MD;  Location: [facility];  Service: Orthopedics    MT JARED SHOULDER ARTHRPLSTY HUMERAL&GLENOID COMPNT Left 9/8/2023    Procedure: removal of antibiotic spacer, incision and debridement,  with revision reverse shoulder arthroplasty;  Surgeon: Lita Aguilar;  Location: [facility];  Service: Orthopedics    MT JARED SHOULDER ARTHRPLSTY HUMERAL/GLENOID COMPNT Left 06/13/2023    Procedure: ARTHROPLASTY SHOULDER REVISION;  Surgeon: Lita Aguilar;  Location: [facility];  Service: Orthopedics    SHOULDER SURGERY Right 2002    WOUND DEBRIDEMENT Left 05/02/2023    Procedure: INCISION AND DRAINAGE (I&D) EXTREMITY, vac placement;  Surgeon: Marcelo Lester MD;  Location: [facility];  Service: Orthopedics        ALLERGIES:  Sulfa antibiotics and Daptomycin    MEDICATIONS:  Current Outpatient Medications   Medication Sig Dispense Refill    bumetanide (BUMEX) 1 mg tablet Take 1 tablet (1 mg total) by mouth daily Do not start before  2024. 30 tablet 0    fluticasone (FLONASE) 50 mcg/act nasal spray 1 spray into each nostril daily 16 g 0    glimepiride (AMARYL) 4 mg tablet Take 4 mg by mouth 2 (two) times a day      Insulin Pen Needle (BD Pen Needle Nayla 2nd Gen) 32G X 4 MM MISC For use with insulin pen. Pharmacy may dispense brand covered by insurance. 100 each 0    Lantus SoloStar 100 units/mL SOPN Inject 40 Units under the skin daily at bedtime      Multiple Vitamin (multivitamin) capsule Take 1 capsule by mouth daily 21 capsule 0    potassium chloride (Klor-Con M20) 20 mEq tablet Take 1 tablet (20 mEq total) by mouth daily Do not start before 2024. 30 tablet 0    spironolactone (ALDACTONE) 50 mg tablet Take 1 tablet (50 mg total) by mouth daily 30 tablet 0    traZODone (DESYREL) 50 mg tablet Take 50 mg by mouth daily at bedtime bedtime      tamsulosin (FLOMAX) 0.4 mg Take 1 capsule (0.4 mg total) by mouth daily with dinner 30 capsule 0     No current facility-administered medications for this visit.       SOCIAL HISTORY:  Social History     Socioeconomic History    Marital status: /Civil Union     Spouse name: None    Number of children: 2    Years of education: 16    Highest education level: Some college, no degree   Occupational History    None   Tobacco Use    Smoking status: Former     Current packs/day: 0.00     Average packs/day: 0.5 packs/day for 20.0 years (10.0 ttl pk-yrs)     Types: Cigarettes     Start date:      Quit date:      Years since quittin.1    Smokeless tobacco: Never   Vaping Use    Vaping status: Never Used   Substance and Sexual Activity    Alcohol use: Not Currently     Comment: quit     Drug use: Never    Sexual activity: Not Currently   Other Topics Concern    None   Social History Narrative    · Most recent tobacco use screenin2018      · Do you currently or have you served in the [employer]:   No      · Live alone or with others:   with others    "   · High blood pressure:   Yes      · Exercise level:   Occasional      · Overweight:   Yes      · Obese:   Yes      · Diabetes:   Yes      Social Determinants of Health     Financial Resource Strain: Not on file   Food Insecurity: No Food Insecurity (2/20/2024)    Hunger Vital Sign     Worried About Running Out of Food in the Last Year: Never true     Ran Out of Food in the Last Year: Never true   Transportation Needs: No Transportation Needs (2/20/2024)    PRAPARE - Transportation     Lack of Transportation (Medical): No     Lack of Transportation (Non-Medical): No   Physical Activity: Not on file   Stress: Not on file   Social Connections: Not on file   Intimate Partner Violence: Not on file   Housing Stability: Low Risk  (2/20/2024)    Housing Stability Vital Sign     Unable to Pay for Housing in the Last Year: No     Number of Places Lived in the Last Year: 1     Unstable Housing in the Last Year: No          Review of Systems   Constitutional:  Negative for chills and fever.   Respiratory:  Negative for shortness of breath.    Cardiovascular:  Negative for chest pain.   Gastrointestinal:  Negative for diarrhea, nausea and vomiting.   Musculoskeletal:  Negative for gait problem.         Objective:        /75   Pulse 90   Ht 5' 11\" (1.803 m)   Wt 107 kg (236 lb)   BMI 32.92 kg/m²     Physical Exam  Vitals reviewed.   Constitutional:       General: He is not in acute distress.     Appearance: Normal appearance. He is not ill-appearing or toxic-appearing.   Cardiovascular:      Rate and Rhythm: Normal rate and regular rhythm.      Pulses: Normal pulses.   Pulmonary:      Effort: Pulmonary effort is normal. No respiratory distress.   Musculoskeletal:         General: Deformity present. No tenderness or signs of injury.   Skin:     General: Skin is warm.      Coloration: Skin is not cyanotic or mottled.      Findings: No abscess, erythema or rash.      Nails: There is no clubbing.      Comments: Full " thickness ulcer on lateral left 4th toe.  Wound bed is granular.  No signs of infection.  No swelling or purulence.  No deep probing.     Neurological:      General: No focal deficit present.      Mental Status: He is alert and oriented to person, place, and time.      Cranial Nerves: No cranial nerve deficit.      Sensory: Sensory deficit present.      Coordination: Coordination normal.   Psychiatric:         Mood and Affect: Mood normal.         Behavior: Behavior normal.         Thought Content: Thought content normal.         Judgment: Judgment normal.

## 2024-03-07 ENCOUNTER — TELEPHONE (OUTPATIENT)
Dept: GASTROENTEROLOGY | Facility: CLINIC | Age: 69
End: 2024-03-07

## 2024-03-07 NOTE — TELEPHONE ENCOUNTER
Called pt to schedule f/u in two months w/ [facility]. A cancellation created an appt opportunity on May 30 @ 3pm.  Pt was booked into that appt.  Called pt to confirm LM.

## 2024-03-07 NOTE — TELEPHONE ENCOUNTER
Spoke with pt, advised of MRI order placed on 2/19/24 by Dr Martins. Pt advised he would need to speak with central scheduling to schedule.  Pt expressed dissatisfaction with coordination of care.  Pt asked who he can speak with about this, recommended speaking with PCP regarding case management referral. Pt expressed appreciation and verbalized understanding of all information

## 2024-03-07 NOTE — TELEPHONE ENCOUNTER
----- Message from Tianna Pimentel PA-C sent at 3/3/2024 11:11 AM EST -----  [facility], can you please add Mr. Avendano to the next liver tumor board?     Dr. Simons, would you willing to present him if I am out on maternity leave at that time?     Clerical team, can we please schedule Mr. Avendano for a 2 month follow-up?     Thank you both in advance!

## 2024-03-07 NOTE — TELEPHONE ENCOUNTER
Patient returned phone call and confirmed he will be able to make the 5/30/24 appointment. Patient states that from what he remembers is he is to have a follow up MRI in three months from the one completed on 2/19/24. No order in chart, please advise if MRI is to be repeated.

## 2024-03-08 ENCOUNTER — DOCUMENTATION (OUTPATIENT)
Dept: HEMATOLOGY ONCOLOGY | Facility: CLINIC | Age: 69
End: 2024-03-08

## 2024-03-08 DIAGNOSIS — K76.9 LIVER LESION: ICD-10-CM

## 2024-03-08 DIAGNOSIS — R18.8 CIRRHOSIS OF LIVER WITH ASCITES, UNSPECIFIED HEPATIC CIRRHOSIS TYPE: Primary | ICD-10-CM

## 2024-03-08 DIAGNOSIS — K74.60 CIRRHOSIS OF LIVER WITH ASCITES, UNSPECIFIED HEPATIC CIRRHOSIS TYPE: Primary | ICD-10-CM

## 2024-03-08 NOTE — PROGRESS NOTES
LIVER MULTIDISCIPLINARY CANCER CONFERENCE    DATE: 3/8/24      PRESENTING DOCTOR:  Dr. Simons      DIAGNOSIS:  Liver Lesion      Isael Avendano was presented at the Liver Multidisciplinary Cancer Conference today.      PHYSICIAN RECOMMENDED PLAN:    - Several LR-3 lesions found on MRI 2/19/24.  The recommended plan is to repeat imaging in 3 months and re-evaluate next steps.  Patient has appointment with Dr. Martins for 3/14/24 for hospital follow up.     Team agreed to plan.    The final treatment plan will be left to the discretion of the patient and the treating physician.     DISCLAIMERS:  TO THE TREATING PHYSICIAN:  This conference is a meeting of clinicians from various specialty areas who evaluate and discuss patients for whom a multidisciplinary treatment approach is being considered. Please note that the above opinion was a consensus of the conference attendees and is intended only to assist in quality care of the discussed patient.  The responsibility for follow up on the input given during the conference, along with any final decisions regarding plan of care, is that of the patient and the patient's provider. Accordingly, appointments have only been recommended based on this information and have NOT been scheduled unless otherwise noted.      TO THE PATIENT:  This summary is a brief record of major aspects of your cancer treatment. You may choose to share a copy with any of your doctors or nurses. However, this is not a detailed or comprehensive record of your care.      NCCN guidelines were readily available for review at this discussion

## 2024-03-12 ENCOUNTER — OFFICE VISIT (OUTPATIENT)
Dept: PODIATRY | Facility: CLINIC | Age: 69
End: 2024-03-12
Payer: MEDICARE

## 2024-03-12 ENCOUNTER — APPOINTMENT (OUTPATIENT)
Dept: RADIOLOGY | Age: 69
End: 2024-03-12
Payer: MEDICARE

## 2024-03-12 VITALS
WEIGHT: 236 LBS | SYSTOLIC BLOOD PRESSURE: 112 MMHG | HEIGHT: 71 IN | BODY MASS INDEX: 33.04 KG/M2 | HEART RATE: 94 BPM | DIASTOLIC BLOOD PRESSURE: 71 MMHG

## 2024-03-12 DIAGNOSIS — E11.40 TYPE 2 DIABETES MELLITUS WITH DIABETIC NEUROPATHY, WITH LONG-TERM CURRENT USE OF INSULIN (HCC): ICD-10-CM

## 2024-03-12 DIAGNOSIS — L97.522 ULCER OF TOE, LEFT, WITH FAT LAYER EXPOSED (HCC): Primary | ICD-10-CM

## 2024-03-12 DIAGNOSIS — L97.522 ULCER OF TOE, LEFT, WITH FAT LAYER EXPOSED (HCC): ICD-10-CM

## 2024-03-12 DIAGNOSIS — Z79.4 TYPE 2 DIABETES MELLITUS WITH DIABETIC NEUROPATHY, WITH LONG-TERM CURRENT USE OF INSULIN (HCC): ICD-10-CM

## 2024-03-12 PROCEDURE — 73660 X-RAY EXAM OF TOE(S): CPT

## 2024-03-12 PROCEDURE — 99213 OFFICE O/P EST LOW 20 MIN: CPT | Performed by: PODIATRIST

## 2024-03-12 NOTE — PROGRESS NOTES
Patient ID: Isael Avendano is a 68 y.o. male Date of Birth 1955       Assessment:    No problem-specific Assessment & Plan notes found for this encounter.       Diagnoses and all orders for this visit:    Ulcer of toe, left, with fat layer exposed (HCC)    Type 2 diabetes mellitus with diabetic neuropathy, with long-term current use of insulin (HCC)            Procedures    Plan:  1. Reviewed medical records.  Reviewed / discussed X-ray.  No obvious cortical erosion.  2. Wound is stable and smaller without signs of infection. Stressed on patient compliance about proper off-loading.   3. Continue Silver alginate and DSD to left 4th toe ulcer.  4. Consider amputation if wound fails to heal or gets worse in the next few weeks.  5. Continue orthowedge shoe.  Pt to call the office if any signs of infection develop or significant change in appearance of wound.  67. He will return in 2 weeks.       Subjective:      .  HPI  Isael presents for evaluation of left 4th toe ulcer.  He feels well.  Wound looks smaller.  No redness or swelling.  No pain.  BS under control.            The following portions of the patient's history were reviewed and updated as appropriate: allergies, current medications, past family history, past medical history, past social history, past surgical history and problem list.      PAST MEDICAL HISTORY:  Past Medical History:   Diagnosis Date    Arrhythmia     Chronic kidney disease     COVID 12/2020    CPAP (continuous positive airway pressure) dependence     Diabetes mellitus (HCC)     History of echocardiogram 11/14/2017    showed EF of 50-55 percentWith moderate LVH and left ventricle diastolic dysfunction. Left atrium was moderately enlarged. Trace MR noted.     History of Holter monitoring 11/21/2017    showed baseline rhythm of sinus origin with an average heart it of 61 bpm. The lowest heart rate was 49 and the highest heart rate was 10 8 bpm. There were rare single VPCs, and frequent PACs  representing 3.2% of total beats. There were several episodes of sinus arrhythmias with sinus bradycardia and heart rate ranging from 40-90 bpm. No sustained dysrhythmias, or pauses noted. The patient did not    History of transfusion 04/2023    platelets    Liver disease     cirhosis    Murmur, cardiac     Obese     Sleep apnea        PAST SURGICAL HISTORY:  Past Surgical History:   Procedure Laterality Date    CATARACT EXTRACTION      EYE SURGERY Left 1997    FL GUIDED NEEDLE PLAC BX/ASP/INJ  8/28/2023    HERNIA REPAIR  [id number]    IR PARACENTESIS  2/19/2024    IR PARACENTESIS  2/21/2024    JOINT REPLACEMENT Right     TKR    KNEE ARTHROPLASTY Right 2008    HI ARTHROPLASTY GLENOHUMERAL JOINT TOTAL SHOULDER Left 04/04/2023    Procedure: ARTHROPLASTY SHOULDER REVERSE;  Surgeon: Marcelo Lester MD;  [facility];  Service: Orthopedics    HI JARED SHOULDER ARTHRPLSTY HUMERAL&GLENOID COMPNT Left 9/8/2023    Procedure: removal of antibiotic spacer, incision and debridement,  with revision reverse shoulder arthroplasty;  Surgeon: Lita Aguilar;  Location[facility];  Service: Orthopedics    HI JARED SHOULDER ARTHRPLSTY HUMERAL/GLENOID COMPNT Left 06/13/2023    Procedure: ARTHROPLASTY SHOULDER REVISION;  Surgeon: Lita Aguilar;  [facility];  Service: Orthopedics    SHOULDER SURGERY Right 2002    WOUND DEBRIDEMENT Left 05/02/2023    Procedure: INCISION AND DRAINAGE (I&D) EXTREMITY, vac placement;  Surgeon: Marcelo Lester MD;  [facility];  Service: Orthopedics        ALLERGIES:  Sulfa antibiotics and Daptomycin    MEDICATIONS:  Current Outpatient Medications   Medication Sig Dispense Refill    bumetanide (BUMEX) 1 mg tablet Take 1 tablet (1 mg total) by mouth daily Do not start before February 29, 2024. 30 tablet 0    fluticasone (FLONASE) 50 mcg/act nasal spray 1 spray into each nostril daily 16 g 0    glimepiride (AMARYL) 4 mg tablet Take 4 mg by mouth 2 (two) times a day       Insulin Pen Needle (BD Pen Needle Nayla 2nd Gen) 32G X 4 MM MISC For use with insulin pen. Pharmacy may dispense brand covered by insurance. 100 each 0    Lantus SoloStar 100 units/mL SOPN Inject 40 Units under the skin daily at bedtime      Multiple Vitamin (multivitamin) capsule Take 1 capsule by mouth daily 21 capsule 0    potassium chloride (Klor-Con M20) 20 mEq tablet Take 1 tablet (20 mEq total) by mouth daily Do not start before 2024. 30 tablet 0    spironolactone (ALDACTONE) 50 mg tablet Take 1 tablet (50 mg total) by mouth daily 30 tablet 0    traZODone (DESYREL) 50 mg tablet Take 50 mg by mouth daily at bedtime bedtime      tamsulosin (FLOMAX) 0.4 mg Take 1 capsule (0.4 mg total) by mouth daily with dinner 30 capsule 0     No current facility-administered medications for this visit.       SOCIAL HISTORY:  Social History     Socioeconomic History    Marital status: /Civil Union     Spouse name: None    Number of children: 2    Years of education: 16    Highest education level: Some college, no degree   Occupational History    None   Tobacco Use    Smoking status: Former     Current packs/day: 0.00     Average packs/day: 0.5 packs/day for 20.0 years (10.0 ttl pk-yrs)     Types: Cigarettes     Start date:      Quit date:      Years since quittin.2    Smokeless tobacco: Never   Vaping Use    Vaping status: Never Used   Substance and Sexual Activity    Alcohol use: Not Currently     Comment: quit     Drug use: Never    Sexual activity: Not Currently   Other Topics Concern    None   Social History Narrative    · Most recent tobacco use screenin2018      · Do you currently or have you served in the [employer]:   No      · Live alone or with others:   with others      · High blood pressure:   Yes      · Exercise level:   Occasional      · Overweight:   Yes      · Obese:   Yes      · Diabetes:   Yes      Social Determinants of Health     Financial Resource  "Strain: Not on file   Food Insecurity: No Food Insecurity (2/20/2024)    Hunger Vital Sign     Worried About Running Out of Food in the Last Year: Never true     Ran Out of Food in the Last Year: Never true   Transportation Needs: No Transportation Needs (2/20/2024)    PRAPARE - Transportation     Lack of Transportation (Medical): No     Lack of Transportation (Non-Medical): No   Physical Activity: Not on file   Stress: Not on file   Social Connections: Not on file   Intimate Partner Violence: Not on file   Housing Stability: Low Risk  (2/20/2024)    Housing Stability Vital Sign     Unable to Pay for Housing in the Last Year: No     Number of Places Lived in the Last Year: 1     Unstable Housing in the Last Year: No          Review of Systems   Constitutional:  Negative for chills and fever.   Respiratory:  Negative for shortness of breath.    Cardiovascular:  Negative for chest pain.   Gastrointestinal:  Negative for diarrhea, nausea and vomiting.   Musculoskeletal:  Negative for gait problem.         Objective:        /71   Pulse 94   Ht 5' 11\" (1.803 m)   Wt 107 kg (236 lb)   BMI 32.92 kg/m²     Physical Exam  Vitals reviewed.   Constitutional:       General: He is not in acute distress.     Appearance: Normal appearance. He is not ill-appearing or toxic-appearing.   Cardiovascular:      Rate and Rhythm: Normal rate and regular rhythm.      Pulses: Normal pulses.   Pulmonary:      Effort: Pulmonary effort is normal. No respiratory distress.   Musculoskeletal:         General: Deformity present. No tenderness or signs of injury.      Right lower leg: Edema present.      Left lower leg: Edema present.   Skin:     General: Skin is warm.      Coloration: Skin is not cyanotic or mottled.      Findings: No abscess, erythema or rash.      Nails: There is no clubbing.      Comments: Granular ulcer on lateral left 4th toe.  It is smaller.  It measures 0.4 x 0.5 cm.  No signs of infection.  No swelling or " purulence.  No deep probing.     Neurological:      General: No focal deficit present.      Mental Status: He is alert and oriented to person, place, and time.      Cranial Nerves: No cranial nerve deficit.      Sensory: Sensory deficit present.      Coordination: Coordination normal.   Psychiatric:         Mood and Affect: Mood normal.         Behavior: Behavior normal.         Thought Content: Thought content normal.         Judgment: Judgment normal.

## 2024-03-14 ENCOUNTER — OFFICE VISIT (OUTPATIENT)
Dept: HEMATOLOGY ONCOLOGY | Facility: CLINIC | Age: 69
End: 2024-03-14
Payer: MEDICARE

## 2024-03-14 VITALS
OXYGEN SATURATION: 99 % | DIASTOLIC BLOOD PRESSURE: 82 MMHG | HEART RATE: 95 BPM | RESPIRATION RATE: 17 BRPM | TEMPERATURE: 97.6 F | SYSTOLIC BLOOD PRESSURE: 126 MMHG | BODY MASS INDEX: 31.5 KG/M2 | HEIGHT: 71 IN | WEIGHT: 225 LBS

## 2024-03-14 DIAGNOSIS — K70.31 ALCOHOLIC CIRRHOSIS OF LIVER WITH ASCITES (HCC): ICD-10-CM

## 2024-03-14 DIAGNOSIS — D73.2 SPLENOMEGALY, CONGESTIVE, CHRONIC: ICD-10-CM

## 2024-03-14 DIAGNOSIS — D50.0 IRON DEFICIENCY ANEMIA DUE TO CHRONIC BLOOD LOSS: ICD-10-CM

## 2024-03-14 DIAGNOSIS — E61.1 IRON DEFICIENCY: ICD-10-CM

## 2024-03-14 DIAGNOSIS — D61.818 PANCYTOPENIA (HCC): Primary | ICD-10-CM

## 2024-03-14 PROCEDURE — 99215 OFFICE O/P EST HI 40 MIN: CPT | Performed by: INTERNAL MEDICINE

## 2024-03-14 PROCEDURE — G2211 COMPLEX E/M VISIT ADD ON: HCPCS | Performed by: INTERNAL MEDICINE

## 2024-03-14 NOTE — PROGRESS NOTES
[facility] [place]  [address] [facility] [address]  [facility] [address]  [phone]    Isael Avendano,1955, [id number]  03/14/24    Discussion:   In summary, this is a 68-year-old male with a history of pancytopenia on and off for several years.  aFP September 2023 was 29.6.  Abdominal MRI showed 3 hepatic lesions, LR 3.  Spleen 19 cm.  See interval history.  Myeloma studies currently pending.  I think it is highly likely that pancytopenia as a result of hypersplenism.  Blood counts are depressed in proportion to splenomegaly.  Substrates intact.  Recent hemoglobin 10, consistent with response to parenteral iron.  Iron deficiency results from varices +/- GAVE.  Liver lesions are of uncertain significance.  Repeat MRI is anticipated in a few months.  For now, hematologic monitoring is appropriate.  I will see him back in 3 months for review.  Monthly CBC, CMP.  Iron studies prior to next visit.  I discussed the above with the patient.  The patient and his wife voiced understanding and agreement.  ______________________________________________________________________    Chief Complaint   Patient presents with    Follow-up       HPI:  Oncology History    No history exists.       Interval History:  Previous workup was not accomplished.  Recent hospitalization WBC 3.6, hemoglobin 4.9, MCV 74, platelet count 47, normal differential.  CMP showed glucose 157, AST 50, alk phos 219, albumin 2.9, bilirubin 1.9.  INR 1.49.  Iron saturation 6%, ferritin 7.  Vitamin B12 1100.  Folate normal.  Haptoglobin depressed, 20.  Fibrinogen 247. KASHMIR neg. paracentesis cytology negative.  AFP 67.  MRI showed cirrhotic liver morphology.  2 lesions LR 3, 1 lesion not currently identified.  Study limited by ascites/technical issues.  2.4 x 0.6 cm enhancing lesion along the left chest wall new since prior MRI of October 2023, reactive lymph node, less likely schwannoma.  Spleen 19 cm.  2  subcentimeter splenic lesions suggest perfusional variant versus hemangioma.  EGD during hospitalization showed several varices, banded.  Prior colonoscopy in December 2023 showed 15 mm sigmoid polyp, tubular adenoma, hemorrhoids.    ECOG-  1 - Symptomatic but completely ambulatory    Review of Systems   Constitutional:  Positive for fatigue. Negative for chills and fever.   HENT:  Negative for nosebleeds.    Eyes:  Negative for discharge.   Respiratory:  Negative for cough and shortness of breath.    Cardiovascular:  Negative for chest pain.   Gastrointestinal:  Negative for abdominal pain, constipation and diarrhea.   Endocrine: Negative for polydipsia.   Genitourinary:  Negative for hematuria.   Musculoskeletal:  Negative for arthralgias.   Skin:  Negative for color change.   Allergic/Immunologic: Negative for immunocompromised state.   Neurological:  Positive for weakness. Negative for dizziness and headaches.   Hematological:  Negative for adenopathy.   Psychiatric/Behavioral:  Negative for agitation.        Past Medical History:   Diagnosis Date    Arrhythmia     Chronic kidney disease     COVID 12/2020    CPAP (continuous positive airway pressure) dependence     Diabetes mellitus (HCC)     History of echocardiogram 11/14/2017    showed EF of 50-55 percentWith moderate LVH and left ventricle diastolic dysfunction. Left atrium was moderately enlarged. Trace MR noted.     History of Holter monitoring 11/21/2017    showed baseline rhythm of sinus origin with an average heart it of 61 bpm. The lowest heart rate was 49 and the highest heart rate was 10 8 bpm. There were rare single VPCs, and frequent PACs representing 3.2% of total beats. There were several episodes of sinus arrhythmias with sinus bradycardia and heart rate ranging from 40-90 bpm. No sustained dysrhythmias, or pauses noted. The patient did not    History of transfusion 04/2023    platelets    Liver disease     cirhosis    Murmur, cardiac     Obese      Sleep apnea      Patient Active Problem List   Diagnosis    Alcohol abuse    DM2 (diabetes mellitus, type 2) (HCC)    Essential hypertension    ABILIO (acute kidney injury) (HCC)    Cholelithiasis    Cirrhosis, alcoholic (HCC)    MEG (obstructive sleep apnea)    Pancytopenia (HCC)    Insomnia    Elevated troponin    Chest pressure    Diabetic ulcer of left foot associated with type 2 diabetes mellitus (HCC)    Leukopenia    Cellulitis    Obesity, morbid (HCC)    Post-traumatic osteoarthritis of left shoulder    Morbid obesity (HCC)    S/P reverse total shoulder arthroplasty, left    Aftercare following left shoulder joint replacement surgery    Ulcer of left foot, limited to breakdown of skin (HCC)    Postoperative infection of surgical wound    Stage 3a chronic kidney disease (HCC)    Murmur    Urinary retention    Pyogenic arthritis of left shoulder region (HCC)    Perineal abscess    Symptomatic anemia    Volume overload    Chest wall mass    Cough       Current Outpatient Medications:     bumetanide (BUMEX) 1 mg tablet, Take 1 tablet (1 mg total) by mouth daily Do not start before February 29, 2024., Disp: 30 tablet, Rfl: 0    fluticasone (FLONASE) 50 mcg/act nasal spray, 1 spray into each nostril daily, Disp: 16 g, Rfl: 0    glimepiride (AMARYL) 4 mg tablet, Take 4 mg by mouth 2 (two) times a day, Disp: , Rfl:     Insulin Pen Needle (BD Pen Needle Nayla 2nd Gen) 32G X 4 MM MISC, For use with insulin pen. Pharmacy may dispense brand covered by insurance., Disp: 100 each, Rfl: 0    Lantus SoloStar 100 units/mL SOPN, Inject 40 Units under the skin daily at bedtime, Disp: , Rfl:     Multiple Vitamin (multivitamin) capsule, Take 1 capsule by mouth daily, Disp: 21 capsule, Rfl: 0    potassium chloride (Klor-Con M20) 20 mEq tablet, Take 1 tablet (20 mEq total) by mouth daily Do not start before February 29, 2024., Disp: 30 tablet, Rfl: 0    spironolactone (ALDACTONE) 50 mg tablet, Take 1 tablet (50 mg total) by mouth  "daily, Disp: 30 tablet, Rfl: 0    traZODone (DESYREL) 50 mg tablet, Take 50 mg by mouth daily at bedtime bedtime, Disp: , Rfl:     tamsulosin (FLOMAX) 0.4 mg, Take 1 capsule (0.4 mg total) by mouth daily with dinner, Disp: 30 capsule, Rfl: 0  Allergies   Allergen Reactions    Sulfa Antibiotics Hives    Daptomycin Other (See Comments)     Possibly contributed to ABILIO on 6/2023 admission      Past Surgical History:   Procedure Laterality Date    CATARACT EXTRACTION      EYE SURGERY Left 1997    FL GUIDED NEEDLE PLAC BX/ASP/INJ  8/28/2023    HERNIA REPAIR  [id number]    IR PARACENTESIS  2/19/2024    IR PARACENTESIS  2/21/2024    JOINT REPLACEMENT Right     TKR    KNEE ARTHROPLASTY Right 2008    NC ARTHROPLASTY GLENOHUMERAL JOINT TOTAL SHOULDER Left 04/04/2023    Procedure: ARTHROPLASTY SHOULDER REVERSE;  Surgeon: Marcelo Lester MD;  [facility];  Service: Orthopedics    NC JARED SHOULDER ARTHRPLSTY HUMERAL&GLENOID COMPNT Left 9/8/2023    Procedure: removal of antibiotic spacer, incision and debridement,  with revision reverse shoulder arthroplasty;  Surgeon: Lita Aguilar;  Location: [facility];  Service: Orthopedics    NC JARED SHOULDER ARTHRPLSTY HUMERAL/GLENOID COMPNT Left 06/13/2023    Procedure: ARTHROPLASTY SHOULDER REVISION;  Surgeon: Lita Aguilar;  [facility];  Service: Orthopedics    SHOULDER SURGERY Right 2002    WOUND DEBRIDEMENT Left 05/02/2023    Procedure: INCISION AND DRAINAGE (I&D) EXTREMITY, vac placement;  Surgeon: Marcelo Lester MD;  [facility];  Service: Orthopedics     Social History     Objective:  Vitals:    03/14/24 1113   BP: 126/82   BP Location: Left arm   Patient Position: Sitting   Cuff Size: Adult   Pulse: 95   Resp: 17   Temp: 97.6 °F (36.4 °C)   SpO2: 99%   Weight: 102 kg (225 lb)   Height: 5' 11\" (1.803 m)     Physical Exam  Constitutional:       Appearance: He is well-developed.   HENT:      Head: Normocephalic and atraumatic.      " Mouth/Throat:      Mouth: Mucous membranes are moist.   Eyes:      Pupils: Pupils are equal, round, and reactive to light.   Cardiovascular:      Rate and Rhythm: Normal rate and regular rhythm.      Heart sounds: No murmur heard.  Pulmonary:      Breath sounds: Normal breath sounds. No wheezing or rales.   Abdominal:      Palpations: Abdomen is soft.      Tenderness: There is no abdominal tenderness.   Musculoskeletal:         General: No tenderness. Normal range of motion.      Cervical back: Neck supple.   Lymphadenopathy:      Cervical: No cervical adenopathy.   Skin:     Findings: No erythema or rash.   Neurological:      Mental Status: He is alert and oriented to person, place, and time.      Cranial Nerves: No cranial nerve deficit.      Deep Tendon Reflexes: Reflexes are normal and symmetric.   Psychiatric:         Behavior: Behavior normal.           Labs:  I personally reviewed the labs and imaging pertinent to this patient care.

## 2024-03-20 ENCOUNTER — TELEPHONE (OUTPATIENT)
Dept: GASTROENTEROLOGY | Facility: HOSPITAL | Age: 69
End: 2024-03-20

## 2024-03-21 ENCOUNTER — HOSPITAL ENCOUNTER (OUTPATIENT)
Dept: GASTROENTEROLOGY | Facility: HOSPITAL | Age: 69
Setting detail: OUTPATIENT SURGERY
End: 2024-03-21
Payer: MEDICARE

## 2024-03-21 ENCOUNTER — ANESTHESIA EVENT (OUTPATIENT)
Dept: GASTROENTEROLOGY | Facility: HOSPITAL | Age: 69
End: 2024-03-21

## 2024-03-21 ENCOUNTER — ANESTHESIA (OUTPATIENT)
Dept: GASTROENTEROLOGY | Facility: HOSPITAL | Age: 69
End: 2024-03-21

## 2024-03-21 VITALS
BODY MASS INDEX: 31.8 KG/M2 | WEIGHT: 228 LBS | OXYGEN SATURATION: 98 % | TEMPERATURE: 97.8 F | RESPIRATION RATE: 16 BRPM | DIASTOLIC BLOOD PRESSURE: 65 MMHG | SYSTOLIC BLOOD PRESSURE: 123 MMHG | HEART RATE: 90 BPM

## 2024-03-21 DIAGNOSIS — Z87.19 HISTORY OF ESOPHAGEAL VARICES: ICD-10-CM

## 2024-03-21 DIAGNOSIS — K27.9 PUD (PEPTIC ULCER DISEASE): Primary | ICD-10-CM

## 2024-03-21 LAB — GLUCOSE SERPL-MCNC: 198 MG/DL (ref 65–140)

## 2024-03-21 PROCEDURE — 88305 TISSUE EXAM BY PATHOLOGIST: CPT | Performed by: STUDENT IN AN ORGANIZED HEALTH CARE EDUCATION/TRAINING PROGRAM

## 2024-03-21 PROCEDURE — 82948 REAGENT STRIP/BLOOD GLUCOSE: CPT

## 2024-03-21 RX ORDER — SODIUM CHLORIDE 9 MG/ML
INJECTION, SOLUTION INTRAVENOUS CONTINUOUS PRN
Status: DISCONTINUED | OUTPATIENT
Start: 2024-03-21 | End: 2024-03-21

## 2024-03-21 RX ORDER — PHENYLEPHRINE HCL IN 0.9% NACL 1 MG/10 ML
SYRINGE (ML) INTRAVENOUS AS NEEDED
Status: DISCONTINUED | OUTPATIENT
Start: 2024-03-21 | End: 2024-03-21

## 2024-03-21 RX ORDER — LIDOCAINE HYDROCHLORIDE 10 MG/ML
INJECTION, SOLUTION EPIDURAL; INFILTRATION; INTRACAUDAL; PERINEURAL AS NEEDED
Status: DISCONTINUED | OUTPATIENT
Start: 2024-03-21 | End: 2024-03-21

## 2024-03-21 RX ORDER — PANTOPRAZOLE SODIUM 40 MG/1
40 TABLET, DELAYED RELEASE ORAL DAILY
Qty: 90 TABLET | Refills: 0 | Status: SHIPPED | OUTPATIENT
Start: 2024-03-21 | End: 2024-06-19

## 2024-03-21 RX ORDER — PROPOFOL 10 MG/ML
INJECTION, EMULSION INTRAVENOUS AS NEEDED
Status: DISCONTINUED | OUTPATIENT
Start: 2024-03-21 | End: 2024-03-21

## 2024-03-21 RX ADMIN — Medication 200 MCG: at 08:20

## 2024-03-21 RX ADMIN — Medication 200 MCG: at 08:15

## 2024-03-21 RX ADMIN — Medication 200 MCG: at 08:17

## 2024-03-21 RX ADMIN — PROPOFOL 50 MG: 10 INJECTION, EMULSION INTRAVENOUS at 08:12

## 2024-03-21 RX ADMIN — PROPOFOL 100 MG: 10 INJECTION, EMULSION INTRAVENOUS at 08:08

## 2024-03-21 RX ADMIN — SODIUM CHLORIDE: 0.9 INJECTION, SOLUTION INTRAVENOUS at 08:09

## 2024-03-21 RX ADMIN — PROPOFOL 50 MG: 10 INJECTION, EMULSION INTRAVENOUS at 08:19

## 2024-03-21 RX ADMIN — LIDOCAINE HYDROCHLORIDE 100 MG: 10 INJECTION, SOLUTION EPIDURAL; INFILTRATION; INTRACAUDAL; PERINEURAL at 08:08

## 2024-03-21 RX ADMIN — PROPOFOL 50 MG: 10 INJECTION, EMULSION INTRAVENOUS at 08:17

## 2024-03-21 RX ADMIN — Medication 200 MCG: at 08:22

## 2024-03-21 RX ADMIN — PROPOFOL 50 MG: 10 INJECTION, EMULSION INTRAVENOUS at 08:22

## 2024-03-21 NOTE — ANESTHESIA PREPROCEDURE EVALUATION
1. Have you been to the ER, urgent care clinic since your last visit? Hospitalized since your last visit? No    2. Have you seen or consulted any other health care providers outside of the [address] [place] [address] since your last visit? Include any pap smears or colon screening. No    Chief Complaint   Patient presents with    Well Child     Visit Vitals  /73 (BP 1 Location: Left upper arm, BP Patient Position: Sitting, BP Cuff Size: Large adult)   Pulse 82   Temp 98.3 °F (36.8 °C) (Oral)   Resp 18   Ht 5' 1.81\" (1.57 m)   Wt 213 lb (96.6 kg)   SpO2 97%   BMI 39.20 kg/m²     No flowsheet data found. Procedure:  EGD    Relevant Problems   CARDIO   (+) Essential hypertension   (+) Murmur      ENDO   (+) DM2 (diabetes mellitus, type 2) (HCC)      GI/HEPATIC   (+) Cirrhosis, alcoholic (HCC)      /RENAL   (+) ABILIO (acute kidney injury) (HCC)   (+) Stage 3a chronic kidney disease (HCC)      HEMATOLOGY   (+) Iron deficiency anemia due to chronic blood loss   (+) Pancytopenia (HCC)   (+) Symptomatic anemia      MUSCULOSKELETAL   (+) Post-traumatic osteoarthritis of left shoulder   (+) Pyogenic arthritis of left shoulder region (HCC)      PULMONARY   (+) MEG (obstructive sleep apnea)      Other   (+) Splenomegaly, congestive, chronic        Physical Exam    Airway    Mallampati score: I  TM Distance: >3 FB  Neck ROM: full     Dental        Cardiovascular  Cardiovascular exam normal    Pulmonary  Pulmonary exam normal     Other Findings        Anesthesia Plan  ASA Score- 3     Anesthesia Type- IV sedation with anesthesia with ASA Monitors.         Additional Monitors:     Airway Plan:            Plan Factors-Exercise tolerance (METS): >4 METS.    Chart reviewed. EKG reviewed.  Existing labs reviewed. Patient summary reviewed.    Patient is not a current smoker.  Patient did not smoke on day of surgery.    Obstructive sleep apnea risk education given perioperatively.        Induction- intravenous.    Postoperative Plan- Plan for postoperative opioid use.     Informed Consent- Anesthetic plan and risks discussed with patient.  I personally reviewed this patient with the CRNA. Discussed and agreed on the Anesthesia Plan with the CRNA..

## 2024-03-21 NOTE — H&P
History and Physical - [facility]  Isael Avendano 68 y.o. male MRN: [id number]    HPI: Isael Avendano is a 68 y.o. year old male who presents for variceal screening.      Review of Systems    Historical Information   Past Medical History:   Diagnosis Date    Arrhythmia     Chronic kidney disease     COVID 12/2020    CPAP (continuous positive airway pressure) dependence     Diabetes mellitus (HCC)     History of echocardiogram 11/14/2017    showed EF of 50-55 percentWith moderate LVH and left ventricle diastolic dysfunction. Left atrium was moderately enlarged. Trace MR noted.     History of Holter monitoring 11/21/2017    showed baseline rhythm of sinus origin with an average heart it of 61 bpm. The lowest heart rate was 49 and the highest heart rate was 10 8 bpm. There were rare single VPCs, and frequent PACs representing 3.2% of total beats. There were several episodes of sinus arrhythmias with sinus bradycardia and heart rate ranging from 40-90 bpm. No sustained dysrhythmias, or pauses noted. The patient did not    History of transfusion 04/2023    platelets    Liver disease     cirhosis    Murmur, cardiac     Obese     Sleep apnea      Past Surgical History:   Procedure Laterality Date    CATARACT EXTRACTION      EYE SURGERY Left 1997    FL GUIDED NEEDLE PLAC BX/ASP/INJ  8/28/2023    HERNIA REPAIR  [id number]    IR PARACENTESIS  2/19/2024    IR PARACENTESIS  2/21/2024    JOINT REPLACEMENT Right     TKR    KNEE ARTHROPLASTY Right 2008    NC ARTHROPLASTY GLENOHUMERAL JOINT TOTAL SHOULDER Left 04/04/2023    Procedure: ARTHROPLASTY SHOULDER REVERSE;  Surgeon: Marcelo Lester MD;  Location:  MAIN OR;  Service: Orthopedics    NC JARED SHOULDER ARTHRPLSTY HUMERAL&GLENOID COMPNT Left 9/8/2023    Procedure: removal of antibiotic spacer, incision and debridement,  with revision reverse shoulder arthroplasty;  Surgeon: Lita Aguilar;  Location:  MAIN OR;  Service: Orthopedics    NC JARED SHOULDER  ARTHRPLSTY HUMERAL/GLENOID COMPNT Left 2023    Procedure: ARTHROPLASTY SHOULDER REVISION;  Surgeon: Lita Aguilar;  [facility];  Service: Orthopedics    SHOULDER SURGERY Right 2002    WOUND DEBRIDEMENT Left 2023    Procedure: INCISION AND DRAINAGE (I&D) EXTREMITY, vac placement;  Surgeon: Marcelo Lester MD;  [facility];  Service: Orthopedics     Social History   Social History     Substance and Sexual Activity   Alcohol Use Not Currently    Comment: quit      Social History     Substance and Sexual Activity   Drug Use Never     Social History     Tobacco Use   Smoking Status Former    Current packs/day: 0.00    Average packs/day: 0.5 packs/day for 20.0 years (10.0 ttl pk-yrs)    Types: Cigarettes    Start date:     Quit date:     Years since quittin.2   Smokeless Tobacco Never     Family History   Problem Relation Age of Onset    Diabetes Mother     Supraventricular tachycardia Mother     COPD Father     Heart disease Father     Other Father         Sepsis; related to UTI with complicating cardiac problems    Heart disease Paternal Grandmother     Prostate cancer Paternal Grandfather 82       Meds/Allergies     (Not in a hospital admission)      Allergies   Allergen Reactions    Sulfa Antibiotics Hives    Daptomycin Other (See Comments)     Possibly contributed to ABILIO on 2023 admission        Objective     /59   Pulse 90   Temp 97.9 °F (36.6 °C) (Temporal)   Resp 20   Wt 103 kg (228 lb)   SpO2 99%   BMI 31.80 kg/m²       PHYSICAL EXAM    Gen: NAD  CV: RRR  CHEST: Clear  ABD: soft, NT/ND  EXT: no edema  Neuro: AAO      ASSESSMENT/PLAN:  This is a 68 y.o. year old male here for variceal screening.    PLAN:   Procedure:EGD with Possible banding.

## 2024-03-21 NOTE — ANESTHESIA POSTPROCEDURE EVALUATION
Post-Op Assessment Note    CV Status:  Stable  Pain Score: 0    Pain management: adequate       Mental Status:  Alert and awake   Hydration Status:  Euvolemic   PONV Controlled:  Controlled   Airway Patency:  Patent     Post Op Vitals Reviewed: Yes    No anethesia notable event occurred.    Staff: CRNA               /58 (03/21/24 0828)    Temp 98.4 °F (36.9 °C) (03/21/24 0828)    Pulse 91 (03/21/24 0828)   Resp 16 (03/21/24 0828)    SpO2 96 % (03/21/24 0828)

## 2024-03-25 PROCEDURE — 88305 TISSUE EXAM BY PATHOLOGIST: CPT | Performed by: STUDENT IN AN ORGANIZED HEALTH CARE EDUCATION/TRAINING PROGRAM

## 2024-03-26 ENCOUNTER — OFFICE VISIT (OUTPATIENT)
Dept: PODIATRY | Facility: CLINIC | Age: 69
End: 2024-03-26
Payer: MEDICARE

## 2024-03-26 ENCOUNTER — TELEPHONE (OUTPATIENT)
Dept: GASTROENTEROLOGY | Facility: CLINIC | Age: 69
End: 2024-03-26

## 2024-03-26 VITALS
BODY MASS INDEX: 31.92 KG/M2 | HEART RATE: 92 BPM | SYSTOLIC BLOOD PRESSURE: 121 MMHG | WEIGHT: 228 LBS | DIASTOLIC BLOOD PRESSURE: 72 MMHG | HEIGHT: 71 IN

## 2024-03-26 DIAGNOSIS — Z79.4 TYPE 2 DIABETES MELLITUS WITH DIABETIC NEUROPATHY, WITH LONG-TERM CURRENT USE OF INSULIN (HCC): ICD-10-CM

## 2024-03-26 DIAGNOSIS — L97.522 ULCER OF TOE, LEFT, WITH FAT LAYER EXPOSED (HCC): Primary | ICD-10-CM

## 2024-03-26 DIAGNOSIS — E11.40 TYPE 2 DIABETES MELLITUS WITH DIABETIC NEUROPATHY, WITH LONG-TERM CURRENT USE OF INSULIN (HCC): ICD-10-CM

## 2024-03-26 PROCEDURE — 99213 OFFICE O/P EST LOW 20 MIN: CPT | Performed by: PODIATRIST

## 2024-03-26 NOTE — PROGRESS NOTES
Patient ID: Isael Avendano is a 68 y.o. male Date of Birth 1955       Assessment:    No problem-specific Assessment & Plan notes found for this encounter.       Diagnoses and all orders for this visit:    Ulcer of toe, left, with fat layer exposed (HCC)    Type 2 diabetes mellitus with diabetic neuropathy, with long-term current use of insulin (HCC)            Procedures    Plan:  1. Reviewed medical records.  Wound is covered with superficial eschar now.  Instructed protective dressing.  Instructed skin care.  Stressed on patient compliance about proper off-loading.   2. Consider amputation if wound fails to heal or gets worse in the next few weeks.  3. Continue orthowedge shoe.  Pt to call the office if any signs of infection develop or significant change in appearance of wound.  4. He will return in 3 weeks.       Subjective:      .  HPI  Isael presents for evaluation of left 4th toe ulcer.  He feels well.  No drainage.  No redness or swelling.  No pain.  BS under control.  No new complaint.      The following portions of the patient's history were reviewed and updated as appropriate: allergies, current medications, past family history, past medical history, past social history, past surgical history and problem list.      PAST MEDICAL HISTORY:  Past Medical History:   Diagnosis Date    Arrhythmia     Chronic kidney disease     COVID 12/2020    CPAP (continuous positive airway pressure) dependence     Diabetes mellitus (HCC)     History of echocardiogram 11/14/2017    showed EF of 50-55 percentWith moderate LVH and left ventricle diastolic dysfunction. Left atrium was moderately enlarged. Trace MR noted.     History of Holter monitoring 11/21/2017    showed baseline rhythm of sinus origin with an average heart it of 61 bpm. The lowest heart rate was 49 and the highest heart rate was 10 8 bpm. There were rare single VPCs, and frequent PACs representing 3.2% of total beats. There were several episodes of sinus  arrhythmias with sinus bradycardia and heart rate ranging from 40-90 bpm. No sustained dysrhythmias, or pauses noted. The patient did not    History of transfusion 04/2023    platelets    Liver disease     cirhosis    Murmur, cardiac     Obese     Sleep apnea        PAST SURGICAL HISTORY:  Past Surgical History:   Procedure Laterality Date    CATARACT EXTRACTION      EYE SURGERY Left 1997    FL GUIDED NEEDLE PLAC BX/ASP/INJ  8/28/2023    HERNIA REPAIR  [id number]    IR PARACENTESIS  2/19/2024    IR PARACENTESIS  2/21/2024    JOINT REPLACEMENT Right     TKR    KNEE ARTHROPLASTY Right 2008    NC ARTHROPLASTY GLENOHUMERAL JOINT TOTAL SHOULDER Left 04/04/2023    Procedure: ARTHROPLASTY SHOULDER REVERSE;  Surgeon: Marcelo Lester MD;  [facility]: [facility];  Service: Orthopedics    NC JARED SHOULDER ARTHRPLSTY HUMERAL&GLENOID COMPNT Left 9/8/2023    Procedure: removal of antibiotic spacer, incision and debridement,  with revision reverse shoulder arthroplasty;  Surgeon: Lita Aguilar;  Location: [facility];  Service: Orthopedics    NC JARED SHOULDER ARTHRPLSTY HUMERAL/GLENOID COMPNT Left 06/13/2023    Procedure: ARTHROPLASTY SHOULDER REVISION;  Surgeon: Lita Aguilar;  [facility];  Service: Orthopedics    SHOULDER SURGERY Right 2002    WOUND DEBRIDEMENT Left 05/02/2023    Procedure: INCISION AND DRAINAGE (I&D) EXTREMITY, vac placement;  Surgeon: Marcelo Lester MD;  Location[facility];  Service: Orthopedics        ALLERGIES:  Sulfa antibiotics and Daptomycin    MEDICATIONS:  Current Outpatient Medications   Medication Sig Dispense Refill    bumetanide (BUMEX) 1 mg tablet Take 1 tablet (1 mg total) by mouth daily Do not start before February 29, 2024. 30 tablet 0    fluticasone (FLONASE) 50 mcg/act nasal spray 1 spray into each nostril daily 16 g 0    glimepiride (AMARYL) 4 mg tablet Take 4 mg by mouth 2 (two) times a day      Insulin Pen Needle (BD Pen Needle Nayla 2nd Gen) 32G X 4 MM MISC For  use with insulin pen. Pharmacy may dispense brand covered by insurance. 100 each 0    Lantus SoloStar 100 units/mL SOPN Inject 40 Units under the skin daily at bedtime      Multiple Vitamin (multivitamin) capsule Take 1 capsule by mouth daily 21 capsule 0    pantoprazole (PROTONIX) 40 mg tablet Take 1 tablet (40 mg total) by mouth daily 90 tablet 0    potassium chloride (Klor-Con M20) 20 mEq tablet Take 1 tablet (20 mEq total) by mouth daily Do not start before 2024. 30 tablet 0    spironolactone (ALDACTONE) 50 mg tablet Take 1 tablet (50 mg total) by mouth daily 30 tablet 0    traZODone (DESYREL) 50 mg tablet Take 50 mg by mouth daily at bedtime bedtime      tamsulosin (FLOMAX) 0.4 mg Take 1 capsule (0.4 mg total) by mouth daily with dinner 30 capsule 0     No current facility-administered medications for this visit.       SOCIAL HISTORY:  Social History     Socioeconomic History    Marital status: /Civil Union     Spouse name: None    Number of children: 2    Years of education: 16    Highest education level: Some college, no degree   Occupational History    None   Tobacco Use    Smoking status: Former     Current packs/day: 0.00     Average packs/day: 0.5 packs/day for 20.0 years (10.0 ttl pk-yrs)     Types: Cigarettes     Start date:      Quit date:      Years since quittin.2    Smokeless tobacco: Never   Vaping Use    Vaping status: Never Used   Substance and Sexual Activity    Alcohol use: Not Currently     Comment: quit     Drug use: Never    Sexual activity: Not Currently   Other Topics Concern    None   Social History Narrative    · Most recent tobacco use screenin2018      · Do you currently or have you served in the [employer]:   No      · Live alone or with others:   with others      · High blood pressure:   Yes      · Exercise level:   Occasional      · Overweight:   Yes      · Obese:   Yes      · Diabetes:   Yes      Social Determinants of Health  "    Financial Resource Strain: Not on file   Food Insecurity: No Food Insecurity (2/20/2024)    Hunger Vital Sign     Worried About Running Out of Food in the Last Year: Never true     Ran Out of Food in the Last Year: Never true   Transportation Needs: No Transportation Needs (2/20/2024)    PRAPARE - Transportation     Lack of Transportation (Medical): No     Lack of Transportation (Non-Medical): No   Physical Activity: Not on file   Stress: Not on file   Social Connections: Not on file   Intimate Partner Violence: Not on file   Housing Stability: Low Risk  (2/20/2024)    Housing Stability Vital Sign     Unable to Pay for Housing in the Last Year: No     Number of Places Lived in the Last Year: 1     Unstable Housing in the Last Year: No          Review of Systems   Constitutional:  Negative for chills and fever.   Respiratory:  Negative for shortness of breath.    Cardiovascular:  Negative for chest pain.   Gastrointestinal:  Negative for diarrhea, nausea and vomiting.   Musculoskeletal:  Negative for gait problem.         Objective:        /72   Pulse 92   Ht 5' 11\" (1.803 m)   Wt 103 kg (228 lb) Comment: unable to take  BMI 31.80 kg/m²     Physical Exam  Vitals reviewed.   Constitutional:       General: He is not in acute distress.     Appearance: Normal appearance. He is not ill-appearing or toxic-appearing.   Cardiovascular:      Rate and Rhythm: Normal rate and regular rhythm.      Pulses: Normal pulses.   Pulmonary:      Effort: Pulmonary effort is normal. No respiratory distress.   Musculoskeletal:         General: Deformity present. No tenderness or signs of injury.      Right lower leg: Edema present.      Left lower leg: Edema present.   Skin:     General: Skin is warm.      Coloration: Skin is not cyanotic or mottled.      Findings: No abscess, erythema or rash.      Nails: There is no clubbing.      Comments: Eschar noted lateral left 4th toe.  It appears to be superficial.  No drainage.  No " signs of infection.  No swelling.     Neurological:      General: No focal deficit present.      Mental Status: He is alert and oriented to person, place, and time.      Cranial Nerves: No cranial nerve deficit.      Sensory: Sensory deficit present.      Coordination: Coordination normal.   Psychiatric:         Mood and Affect: Mood normal.         Behavior: Behavior normal.         Thought Content: Thought content normal.         Judgment: Judgment normal.

## 2024-03-26 NOTE — TELEPHONE ENCOUNTER
Scheduled date of EGD(as of today): 4/24/24  Physician performing EGD: Dr De La Garza  Location of EGD:   Instructions reviewed with patient by: ls via my chart   Clearances: n/a  Diabetic - Glyxambi, Lantus, Glimepiride

## 2024-03-26 NOTE — TELEPHONE ENCOUNTER
----- Message from Luiza Dent sent at 3/26/2024  9:42 AM EDT -----  Regarding: EGD  Pls see note below. Dr. Howell does not have hospital available currently in that time frame.   ----- Message -----  From: Wilmer Howell MD  Sent: 3/25/2024   5:17 PM EDT  To: Luiza Dent; #    Patient will need a repeat EGD in 4-6 weeks in the hospital.  Can be done by myself, Dr. De La Garza or Dr. Mcdaniel as patient has previously seen Dr. De La Garza in the office, has also seen hepatology.

## 2024-04-16 ENCOUNTER — OFFICE VISIT (OUTPATIENT)
Dept: PODIATRY | Facility: CLINIC | Age: 69
End: 2024-04-16
Payer: MEDICARE

## 2024-04-16 VITALS — HEART RATE: 106 BPM | DIASTOLIC BLOOD PRESSURE: 73 MMHG | SYSTOLIC BLOOD PRESSURE: 118 MMHG

## 2024-04-16 DIAGNOSIS — L97.522 ULCER OF TOE, LEFT, WITH FAT LAYER EXPOSED (HCC): Primary | ICD-10-CM

## 2024-04-16 DIAGNOSIS — Z79.4 TYPE 2 DIABETES MELLITUS WITH DIABETIC NEUROPATHY, WITH LONG-TERM CURRENT USE OF INSULIN (HCC): ICD-10-CM

## 2024-04-16 DIAGNOSIS — E11.40 TYPE 2 DIABETES MELLITUS WITH DIABETIC NEUROPATHY, WITH LONG-TERM CURRENT USE OF INSULIN (HCC): ICD-10-CM

## 2024-04-16 PROCEDURE — 99213 OFFICE O/P EST LOW 20 MIN: CPT | Performed by: PODIATRIST

## 2024-04-16 NOTE — PROGRESS NOTES
Patient ID: Isael Avendano is a 68 y.o. male Date of Birth 1955       Assessment:    No problem-specific Assessment & Plan notes found for this encounter.       Diagnoses and all orders for this visit:    Ulcer of toe, left, with fat layer exposed (HCC)    Type 2 diabetes mellitus with diabetic neuropathy, with long-term current use of insulin (HCC)            Procedures    Plan:  1. Reviewed medical records.  Wound is healed at this time.  Instructed skin care and protection.  Dispensed toe spacers.    2. Pt to continue to monitor for any recurring ulcer or problem.  3. Okay to advance shoes as tolerated.  Consider amputation if wound fails to heal or gets worse in the next few weeks.  4.  Pt to call the office if any signs of infection develop or significant change in appearance of wound.  5. He will return in 6 weeks.       Subjective:      .  HPI  Isael presents for evaluation of left 4th toe ulcer.  He feels well.  No drainage.  No pain.  No redness or swelling.  BS under control.      The following portions of the patient's history were reviewed and updated as appropriate: allergies, current medications, past family history, past medical history, past social history, past surgical history and problem list.      PAST MEDICAL HISTORY:  Past Medical History:   Diagnosis Date    Arrhythmia     Chronic kidney disease     COVID 12/2020    CPAP (continuous positive airway pressure) dependence     Diabetes mellitus (HCC)     History of echocardiogram 11/14/2017    showed EF of 50-55 percentWith moderate LVH and left ventricle diastolic dysfunction. Left atrium was moderately enlarged. Trace MR noted.     History of Holter monitoring 11/21/2017    showed baseline rhythm of sinus origin with an average heart it of 61 bpm. The lowest heart rate was 49 and the highest heart rate was 10 8 bpm. There were rare single VPCs, and frequent PACs representing 3.2% of total beats. There were several episodes of sinus  arrhythmias with sinus bradycardia and heart rate ranging from 40-90 bpm. No sustained dysrhythmias, or pauses noted. The patient did not    History of transfusion 04/2023    platelets    Liver disease     cirhosis    Murmur, cardiac     Obese     Sleep apnea        PAST SURGICAL HISTORY:  Past Surgical History:   Procedure Laterality Date    CATARACT EXTRACTION      EYE SURGERY Left 1997    FL GUIDED NEEDLE PLAC BX/ASP/INJ  8/28/2023    HERNIA REPAIR  [id number]    IR PARACENTESIS  2/19/2024    IR PARACENTESIS  2/21/2024    JOINT REPLACEMENT Right     TKR    KNEE ARTHROPLASTY Right 2008    [place] ARTHROPLASTY GLENOHUMERAL JOINT TOTAL SHOULDER Left 04/04/2023    Procedure: ARTHROPLASTY SHOULDER REVERSE;  Surgeon: Marcelo Lester MD;  [facility];  Service: Orthopedics    AZ JARED SHOULDER ARTHRPLSTY HUMERAL&GLENOID COMPNT Left 9/8/2023    Procedure: removal of antibiotic spacer, incision and debridement,  with revision reverse shoulder arthroplasty;  Surgeon: Lita Aguilar;  [facility];  Service: Orthopedics    AZ JARED SHOULDER ARTHRPLSTY HUMERAL/GLENOID COMPNT Left 06/13/2023    Procedure: ARTHROPLASTY SHOULDER REVISION;  Surgeon: Lita Aguilar;  [facility];  Service: Orthopedics    SHOULDER SURGERY Right 2002    WOUND DEBRIDEMENT Left 05/02/2023    Procedure: INCISION AND DRAINAGE (I&D) EXTREMITY, vac placement;  Surgeon: Marcelo Lester MD;  [facility];  Service: Orthopedics        ALLERGIES:  Sulfa antibiotics and Daptomycin    MEDICATIONS:  Current Outpatient Medications   Medication Sig Dispense Refill    bumetanide (BUMEX) 1 mg tablet Take 1 tablet (1 mg total) by mouth daily Do not start before February 29, 2024. 30 tablet 0    fluticasone (FLONASE) 50 mcg/act nasal spray 1 spray into each nostril daily 16 g 0    glimepiride (AMARYL) 4 mg tablet Take 4 mg by mouth 2 (two) times a day      Insulin Pen Needle (BD Pen Needle Nayla 2nd Gen) 32G X 4 MM MISC For  use with insulin pen. Pharmacy may dispense brand covered by insurance. 100 each 0    Multiple Vitamin (multivitamin) capsule Take 1 capsule by mouth daily 21 capsule 0    pantoprazole (PROTONIX) 40 mg tablet Take 1 tablet (40 mg total) by mouth daily 90 tablet 0    potassium chloride (Klor-Con M20) 20 mEq tablet Take 1 tablet (20 mEq total) by mouth daily Do not start before 2024. 30 tablet 0    tamsulosin (FLOMAX) 0.4 mg Take 1 capsule (0.4 mg total) by mouth daily with dinner 30 capsule 0    traZODone (DESYREL) 50 mg tablet Take 50 mg by mouth daily at bedtime bedtime       No current facility-administered medications for this visit.       SOCIAL HISTORY:  Social History     Socioeconomic History    Marital status: /Civil Union     Spouse name: None    Number of children: 2    Years of education: 16    Highest education level: Some college, no degree   Occupational History    None   Tobacco Use    Smoking status: Former     Current packs/day: 0.00     Average packs/day: 0.5 packs/day for 20.0 years (10.0 ttl pk-yrs)     Types: Cigarettes     Start date:      Quit date:      Years since quittin.3    Smokeless tobacco: Never   Vaping Use    Vaping status: Never Used   Substance and Sexual Activity    Alcohol use: Not Currently     Comment: quit     Drug use: Never    Sexual activity: Not Currently   Other Topics Concern    None   Social History Narrative    · Most recent tobacco use screenin2018      · Do you currently or have you served in the [employer]:   No      · Live alone or with others:   with others      · High blood pressure:   Yes      · Exercise level:   Occasional      · Overweight:   Yes      · Obese:   Yes      · Diabetes:   Yes      Social Determinants of Health     Financial Resource Strain: Not on file   Food Insecurity: No Food Insecurity (2024)    Hunger Vital Sign     Worried About Running Out of Food in the Last Year: Never true      Ran Out of Food in the Last Year: Never true   Transportation Needs: No Transportation Needs (2/20/2024)    PRAPARE - Transportation     Lack of Transportation (Medical): No     Lack of Transportation (Non-Medical): No   Physical Activity: Not on file   Stress: Not on file   Social Connections: Not on file   Intimate Partner Violence: Not on file   Housing Stability: Low Risk  (2/20/2024)    Housing Stability Vital Sign     Unable to Pay for Housing in the Last Year: No     Number of Places Lived in the Last Year: 1     Unstable Housing in the Last Year: No          Review of Systems   Constitutional:  Negative for chills and fever.   Respiratory:  Negative for shortness of breath.    Cardiovascular:  Negative for chest pain.   Gastrointestinal:  Negative for diarrhea, nausea and vomiting.   Musculoskeletal:  Negative for gait problem.         Objective:        /73 (BP Location: Right arm, Patient Position: Sitting, Cuff Size: Large)   Pulse (!) 106     Physical Exam  Vitals reviewed.   Constitutional:       General: He is not in acute distress.     Appearance: Normal appearance. He is not ill-appearing or toxic-appearing.   Cardiovascular:      Rate and Rhythm: Normal rate and regular rhythm.      Pulses: Normal pulses.   Pulmonary:      Effort: Pulmonary effort is normal. No respiratory distress.   Musculoskeletal:         General: Deformity present. No tenderness or signs of injury.      Right lower leg: Edema present.      Left lower leg: Edema present.   Skin:     General: Skin is warm.      Coloration: Skin is not cyanotic or mottled.      Findings: No abscess, erythema or rash.      Nails: There is no clubbing.      Comments: Wound is completely healed left 4th toe.  No redness or swelling.  No signs of infection.     Neurological:      General: No focal deficit present.      Mental Status: He is alert and oriented to person, place, and time.      Cranial Nerves: No cranial nerve deficit.      Sensory:  Sensory deficit present.      Coordination: Coordination normal.   Psychiatric:         Mood and Affect: Mood normal.         Behavior: Behavior normal.         Thought Content: Thought content normal.         Judgment: Judgment normal.

## 2024-04-22 ENCOUNTER — ANESTHESIA (OUTPATIENT)
Dept: ANESTHESIOLOGY | Facility: HOSPITAL | Age: 69
End: 2024-04-22

## 2024-04-22 ENCOUNTER — ANESTHESIA EVENT (OUTPATIENT)
Dept: ANESTHESIOLOGY | Facility: HOSPITAL | Age: 69
End: 2024-04-22

## 2024-04-22 NOTE — ANESTHESIA PREPROCEDURE EVALUATION
Procedure:  PRE-OP ONLY    EGD - prior EGD for variceal banding and angiectasia     TTE: LVEF 65% with G2DD, RV mildly dilated preserved fx  ECG: NSR    Bumex  Relevant Problems   CARDIO   (+) Essential hypertension   (+) Murmur      ENDO   (+) DM2 (diabetes mellitus, type 2) (HCC)      GI/HEPATIC   (+) Cirrhosis, alcoholic (HCC)      /RENAL   (+) ABILIO (acute kidney injury) (HCC)   (+) Stage 3a chronic kidney disease (HCC)      HEMATOLOGY   (+) Iron deficiency anemia due to chronic blood loss   (+) Pancytopenia (HCC)   (+) Symptomatic anemia      MUSCULOSKELETAL   (+) Post-traumatic osteoarthritis of left shoulder   (+) Pyogenic arthritis of left shoulder region (HCC)      PULMONARY   (+) MEG (obstructive sleep apnea)      Other   (+) Splenomegaly, congestive, chronic             Anesthesia Plan  ASA Score- 3     Anesthesia Type- IV sedation with anesthesia with ASA Monitors.         Additional Monitors:     Airway Plan:            Plan Factors-    Chart reviewed. EKG reviewed.  Existing labs reviewed. Patient summary reviewed.                  Induction-     Postoperative Plan-     Informed Consent-

## 2024-04-23 PROBLEM — R05.9 COUGH: Status: RESOLVED | Noted: 2024-02-23 | Resolved: 2024-04-23

## 2024-04-24 ENCOUNTER — ANESTHESIA (OUTPATIENT)
Dept: GASTROENTEROLOGY | Facility: HOSPITAL | Age: 69
End: 2024-04-24

## 2024-04-24 ENCOUNTER — ANESTHESIA EVENT (OUTPATIENT)
Dept: GASTROENTEROLOGY | Facility: HOSPITAL | Age: 69
End: 2024-04-24

## 2024-04-24 ENCOUNTER — HOSPITAL ENCOUNTER (OUTPATIENT)
Dept: GASTROENTEROLOGY | Facility: HOSPITAL | Age: 69
Setting detail: OUTPATIENT SURGERY
Discharge: HOME | End: 2024-04-24
Attending: INTERNAL MEDICINE
Payer: MEDICARE

## 2024-04-24 VITALS
WEIGHT: 231.1 LBS | HEIGHT: 71 IN | HEART RATE: 94 BPM | SYSTOLIC BLOOD PRESSURE: 127 MMHG | TEMPERATURE: 98.3 F | DIASTOLIC BLOOD PRESSURE: 66 MMHG | RESPIRATION RATE: 19 BRPM | BODY MASS INDEX: 32.35 KG/M2 | OXYGEN SATURATION: 97 %

## 2024-04-24 DIAGNOSIS — K27.9 PUD (PEPTIC ULCER DISEASE): ICD-10-CM

## 2024-04-24 DIAGNOSIS — Z87.19 HISTORY OF ESOPHAGEAL VARICES: ICD-10-CM

## 2024-04-24 LAB — GLUCOSE SERPL-MCNC: 203 MG/DL (ref 65–140)

## 2024-04-24 PROCEDURE — 82948 REAGENT STRIP/BLOOD GLUCOSE: CPT

## 2024-04-24 PROCEDURE — 43244 EGD VARICES LIGATION: CPT | Performed by: INTERNAL MEDICINE

## 2024-04-24 PROCEDURE — 43270 EGD LESION ABLATION: CPT | Performed by: INTERNAL MEDICINE

## 2024-04-24 RX ORDER — SODIUM CHLORIDE, SODIUM LACTATE, POTASSIUM CHLORIDE, CALCIUM CHLORIDE 600; 310; 30; 20 MG/100ML; MG/100ML; MG/100ML; MG/100ML
INJECTION, SOLUTION INTRAVENOUS CONTINUOUS PRN
Status: DISCONTINUED | OUTPATIENT
Start: 2024-04-24 | End: 2024-04-24

## 2024-04-24 RX ORDER — INSULIN GLARGINE 100 [IU]/ML
40 INJECTION, SOLUTION SUBCUTANEOUS
COMMUNITY

## 2024-04-24 RX ORDER — PROPOFOL 10 MG/ML
INJECTION, EMULSION INTRAVENOUS AS NEEDED
Status: DISCONTINUED | OUTPATIENT
Start: 2024-04-24 | End: 2024-04-24

## 2024-04-24 RX ORDER — ALBUTEROL SULFATE 2.5 MG/3ML
2.5 SOLUTION RESPIRATORY (INHALATION) ONCE AS NEEDED
Status: CANCELLED | OUTPATIENT
Start: 2024-04-24

## 2024-04-24 RX ORDER — LIDOCAINE HYDROCHLORIDE 20 MG/ML
INJECTION, SOLUTION EPIDURAL; INFILTRATION; INTRACAUDAL; PERINEURAL AS NEEDED
Status: DISCONTINUED | OUTPATIENT
Start: 2024-04-24 | End: 2024-04-24

## 2024-04-24 RX ORDER — ONDANSETRON 2 MG/ML
4 INJECTION INTRAMUSCULAR; INTRAVENOUS ONCE AS NEEDED
Status: CANCELLED | OUTPATIENT
Start: 2024-04-24

## 2024-04-24 RX ORDER — FENTANYL CITRATE/PF 50 MCG/ML
25 SYRINGE (ML) INJECTION
Status: CANCELLED | OUTPATIENT
Start: 2024-04-24

## 2024-04-24 RX ADMIN — LIDOCAINE HYDROCHLORIDE 80 MG: 20 INJECTION, SOLUTION EPIDURAL; INFILTRATION; INTRACAUDAL; PERINEURAL at 08:46

## 2024-04-24 RX ADMIN — SODIUM CHLORIDE, SODIUM LACTATE, POTASSIUM CHLORIDE, AND CALCIUM CHLORIDE: .6; .31; .03; .02 INJECTION, SOLUTION INTRAVENOUS at 07:39

## 2024-04-24 RX ADMIN — PROPOFOL 50 MG: 10 INJECTION, EMULSION INTRAVENOUS at 08:57

## 2024-04-24 RX ADMIN — PROPOFOL 50 MG: 10 INJECTION, EMULSION INTRAVENOUS at 08:52

## 2024-04-24 RX ADMIN — PROPOFOL 30 MG: 10 INJECTION, EMULSION INTRAVENOUS at 08:49

## 2024-04-24 RX ADMIN — PROPOFOL 50 MG: 10 INJECTION, EMULSION INTRAVENOUS at 09:00

## 2024-04-24 RX ADMIN — PROPOFOL 120 MG: 10 INJECTION, EMULSION INTRAVENOUS at 08:46

## 2024-04-24 RX ADMIN — PROPOFOL 50 MG: 10 INJECTION, EMULSION INTRAVENOUS at 08:55

## 2024-04-24 NOTE — ANESTHESIA POSTPROCEDURE EVALUATION
Post-Op Assessment Note    CV Status:  Stable  Pain Score: 0    Pain management: adequate       Mental Status:  Sleepy   Hydration Status:  Stable   PONV Controlled:  None   Airway Patency:  Patent     Post Op Vitals Reviewed: Yes    No anethesia notable event occurred.    Staff: CRNA               BP   124/66   Temp   98.3   Pulse  94   Resp   12   SpO2   87

## 2024-04-24 NOTE — ANESTHESIA PREPROCEDURE EVALUATION
Procedure:  EGD  EGD - prior EGD for variceal banding and angiectasia      TTE: LVEF 65% with G2DD, RV mildly dilated preserved fx  ECG: NSR     Bumex  Relevant Problems   CARDIO   (+) Essential hypertension   (+) Murmur      ENDO   (+) DM2 (diabetes mellitus, type 2) (HCC)      GI/HEPATIC   (+) Cirrhosis, alcoholic (HCC)      /RENAL   (+) ABILIO (acute kidney injury) (HCC)   (+) Stage 3a chronic kidney disease (HCC)      HEMATOLOGY   (+) Iron deficiency anemia due to chronic blood loss   (+) Pancytopenia (HCC)   (+) Symptomatic anemia      MUSCULOSKELETAL   (+) Post-traumatic osteoarthritis of left shoulder   (+) Pyogenic arthritis of left shoulder region (HCC)      PULMONARY   (+) MEG (obstructive sleep apnea)      Other   (+) Splenomegaly, congestive, chronic        Physical Exam    Airway    Mallampati score: II  TM Distance: >3 FB  Neck ROM: full     Dental        Cardiovascular      Pulmonary      Other Findings        Anesthesia Plan  ASA Score- 3     Anesthesia Type- IV sedation with anesthesia with ASA Monitors.         Additional Monitors:     Airway Plan:            Plan Factors-Exercise tolerance (METS): >4 METS.    Chart reviewed. EKG reviewed.  Existing labs reviewed. Patient summary reviewed.    Patient is not a current smoker.      Obstructive sleep apnea risk education given perioperatively.        Induction-     Postoperative Plan-     Informed Consent- Anesthetic plan and risks discussed with patient.  I personally reviewed this patient with the CRNA. Discussed and agreed on the Anesthesia Plan with the CRNA..

## 2024-04-24 NOTE — H&P
History and Physical - [facility]  Isael Avendano 68 y.o. male MRN: [id number]                  HPI: Isael Avendano is a 68 y.o. year old male who presents for alcoholic liver disease and varices      REVIEW OF SYSTEMS: Per the HPI, and otherwise unremarkable.    Historical Information   Past Medical History:   Diagnosis Date    Arrhythmia     Chronic kidney disease     COVID 12/2020    CPAP (continuous positive airway pressure) dependence     Diabetes mellitus (HCC)     History of echocardiogram 11/14/2017    showed EF of 50-55 percentWith moderate LVH and left ventricle diastolic dysfunction. Left atrium was moderately enlarged. Trace MR noted.     History of Holter monitoring 11/21/2017    showed baseline rhythm of sinus origin with an average heart it of 61 bpm. The lowest heart rate was 49 and the highest heart rate was 10 8 bpm. There were rare single VPCs, and frequent PACs representing 3.2% of total beats. There were several episodes of sinus arrhythmias with sinus bradycardia and heart rate ranging from 40-90 bpm. No sustained dysrhythmias, or pauses noted. The patient did not    History of transfusion 04/2023    platelets    Liver disease     cirhosis    Murmur, cardiac     Obese     Sleep apnea      Past Surgical History:   Procedure Laterality Date    CATARACT EXTRACTION      EYE SURGERY Left 1997    FL GUIDED NEEDLE PLAC BX/ASP/INJ  8/28/2023    HERNIA REPAIR  [id number]    IR PARACENTESIS  2/19/2024    IR PARACENTESIS  2/21/2024    JOINT REPLACEMENT Right     TKR    KNEE ARTHROPLASTY Right 2008    NY ARTHROPLASTY GLENOHUMERAL JOINT TOTAL SHOULDER Left 04/04/2023    Procedure: ARTHROPLASTY SHOULDER REVERSE;  Surgeon: Marcelo Lester MD;  Location: [facility];  Service: Orthopedics    NY JARED SHOULDER ARTHRPLSTY HUMERAL&GLENOID COMPNT Left 9/8/2023    Procedure: removal of antibiotic spacer, incision and debridement,  with revision reverse shoulder arthroplasty;  Surgeon: Lita  Lauren;  Location: [facility];  Service: Orthopedics    SC JARED SHOULDER ARTHRPLSTY HUMERAL/GLENOID COMPNT Left 2023    Procedure: ARTHROPLASTY SHOULDER REVISION;  Surgeon: Lita Aguilar;  Location: [facility];  Service: Orthopedics    SHOULDER SURGERY Right 2002    WOUND DEBRIDEMENT Left 2023    Procedure: INCISION AND DRAINAGE (I&D) EXTREMITY, vac placement;  Surgeon: Marcelo Lester MD;  Location: [facility];  Service: Orthopedics     Social History   Social History     Substance and Sexual Activity   Alcohol Use Not Currently    Comment: quit      Social History     Substance and Sexual Activity   Drug Use Never     Social History     Tobacco Use   Smoking Status Former    Current packs/day: 0.00    Average packs/day: 0.5 packs/day for 20.0 years (10.0 ttl pk-yrs)    Types: Cigarettes    Start date:     Quit date:     Years since quittin.3   Smokeless Tobacco Never     Family History   Problem Relation Age of Onset    Diabetes Mother     Supraventricular tachycardia Mother     COPD Father     Heart disease Father     Other Father         Sepsis; related to UTI with complicating cardiac problems    Heart disease Paternal Grandmother     Prostate cancer Paternal Grandfather 82       Meds/Allergies       Current Outpatient Medications:     bumetanide (BUMEX) 1 mg tablet    glimepiride (AMARYL) 4 mg tablet    insulin glargine (LANTUS) 100 units/mL subcutaneous injection    Multiple Vitamin (multivitamin) capsule    pantoprazole (PROTONIX) 40 mg tablet    potassium chloride (Klor-Con M20) 20 mEq tablet    tamsulosin (FLOMAX) 0.4 mg    traZODone (DESYREL) 50 mg tablet    fluticasone (FLONASE) 50 mcg/act nasal spray    Insulin Pen Needle (BD Pen Needle Nayla 2nd Gen) 32G X 4 MM MISC    Allergies   Allergen Reactions    Sulfa Antibiotics Hives    Daptomycin Other (See Comments)     Possibly contributed to ABILIO on 2023 admission        Objective     /64   Pulse 99   Temp  "98.2 °F (36.8 °C) (Temporal)   Resp 20   Ht 5' 11\" (1.803 m)   Wt 105 kg (231 lb 1.6 oz)   SpO2 99%   BMI 32.23 kg/m²       PHYSICAL EXAM    Gen: NAD  Head: NCAT  CV: RRR  CHEST: Clear  ABD: soft, NT/ND  EXT: no edema      ASSESSMENT/PLAN:  This is a 68 y.o. year old male here for EGD, and he is stable and optimized for his procedure.        "

## 2024-05-01 ENCOUNTER — APPOINTMENT (EMERGENCY)
Dept: RADIOLOGY | Facility: HOSPITAL | Age: 69
End: 2024-05-01
Payer: MEDICARE

## 2024-05-01 ENCOUNTER — HOSPITAL ENCOUNTER (EMERGENCY)
Facility: HOSPITAL | Age: 69
Discharge: HOME | End: 2024-05-02
Attending: EMERGENCY MEDICINE
Payer: MEDICARE

## 2024-05-01 DIAGNOSIS — L97.509: ICD-10-CM

## 2024-05-01 DIAGNOSIS — R05.9 COUGH: ICD-10-CM

## 2024-05-01 DIAGNOSIS — I87.2 CHRONIC VENOUS STASIS DERMATITIS OF BOTH LOWER EXTREMITIES: ICD-10-CM

## 2024-05-01 DIAGNOSIS — R73.9 HYPERGLYCEMIA: ICD-10-CM

## 2024-05-01 DIAGNOSIS — E11.65 HYPERGLYCEMIA DUE TO DIABETES MELLITUS (HCC): ICD-10-CM

## 2024-05-01 DIAGNOSIS — L03.90 CELLULITIS: Primary | ICD-10-CM

## 2024-05-01 LAB
ANISOCYTOSIS BLD QL SMEAR: PRESENT
B-OH-BUTYR SERPL-MCNC: <0.05 MMOL/L (ref 0.02–0.27)
BASE EX.OXY STD BLDV CALC-SCNC: 48.6 % (ref 60–80)
BASE EXCESS BLDV CALC-SCNC: -2 MMOL/L
BASOPHILS # BLD AUTO: 0.03 THOUSANDS/ÂΜL (ref 0–0.1)
BASOPHILS NFR BLD AUTO: 1 % (ref 0–1)
CRP SERPL QL: 23.1 MG/L
EOSINOPHIL # BLD AUTO: 0.25 THOUSAND/ÂΜL (ref 0–0.61)
EOSINOPHIL NFR BLD AUTO: 7 % (ref 0–6)
ERYTHROCYTE [DISTWIDTH] IN BLOOD BY AUTOMATED COUNT: 16.4 % (ref 11.6–15.1)
GLUCOSE SERPL-MCNC: 284 MG/DL (ref 65–140)
GLUCOSE SERPL-MCNC: 355 MG/DL (ref 65–140)
HCO3 BLDV-SCNC: 22.9 MMOL/L (ref 24–30)
HCT VFR BLD AUTO: 36.1 % (ref 36.5–49.3)
HGB BLD-MCNC: 11.4 G/DL (ref 12–17)
IMM GRANULOCYTES # BLD AUTO: 0.02 THOUSAND/UL (ref 0–0.2)
IMM GRANULOCYTES NFR BLD AUTO: 1 % (ref 0–2)
LACTATE SERPL-SCNC: 1.8 MMOL/L (ref 0.5–2)
LG PLATELETS BLD QL SMEAR: PRESENT
LYMPHOCYTES # BLD AUTO: 0.45 THOUSANDS/ÂΜL (ref 0.6–4.47)
LYMPHOCYTES NFR BLD AUTO: 13 % (ref 14–44)
MACROCYTES BLD QL AUTO: PRESENT
MCH RBC QN AUTO: 27 PG (ref 26.8–34.3)
MCHC RBC AUTO-ENTMCNC: 31.6 G/DL (ref 31.4–37.4)
MCV RBC AUTO: 85 FL (ref 82–98)
MICROCYTES BLD QL AUTO: PRESENT
MONOCYTES # BLD AUTO: 0.31 THOUSAND/ÂΜL (ref 0.17–1.22)
MONOCYTES NFR BLD AUTO: 9 % (ref 4–12)
NEUTROPHILS # BLD AUTO: 2.34 THOUSANDS/ÂΜL (ref 1.85–7.62)
NEUTS SEG NFR BLD AUTO: 69 % (ref 43–75)
NRBC BLD AUTO-RTO: 0 /100 WBCS
O2 CT BLDV-SCNC: 9.7 ML/DL
PCO2 BLDV: 39.8 MM HG (ref 42–50)
PH BLDV: 7.38 [PH] (ref 7.3–7.4)
PLATELET # BLD AUTO: 68 THOUSANDS/UL (ref 149–390)
PLATELET BLD QL SMEAR: ABNORMAL
PO2 BLDV: 28 MM HG (ref 35–45)
POLYCHROMASIA BLD QL SMEAR: PRESENT
RBC # BLD AUTO: 4.23 MILLION/UL (ref 3.88–5.62)
RBC MORPH BLD: PRESENT
WBC # BLD AUTO: 3.4 THOUSAND/UL (ref 4.31–10.16)

## 2024-05-01 PROCEDURE — 96367 TX/PROPH/DG ADDL SEQ IV INF: CPT

## 2024-05-01 PROCEDURE — 86140 C-REACTIVE PROTEIN: CPT | Performed by: EMERGENCY MEDICINE

## 2024-05-01 PROCEDURE — 96366 THER/PROPH/DIAG IV INF ADDON: CPT

## 2024-05-01 PROCEDURE — 85025 COMPLETE CBC W/AUTO DIFF WBC: CPT | Performed by: EMERGENCY MEDICINE

## 2024-05-01 PROCEDURE — 99284 EMERGENCY DEPT VISIT MOD MDM: CPT

## 2024-05-01 PROCEDURE — 96365 THER/PROPH/DIAG IV INF INIT: CPT

## 2024-05-01 PROCEDURE — 83605 ASSAY OF LACTIC ACID: CPT | Performed by: EMERGENCY MEDICINE

## 2024-05-01 PROCEDURE — 99284 EMERGENCY DEPT VISIT MOD MDM: CPT | Performed by: EMERGENCY MEDICINE

## 2024-05-01 PROCEDURE — 82805 BLOOD GASES W/O2 SATURATION: CPT | Performed by: EMERGENCY MEDICINE

## 2024-05-01 PROCEDURE — 96375 TX/PRO/DX INJ NEW DRUG ADDON: CPT

## 2024-05-01 PROCEDURE — 80053 COMPREHEN METABOLIC PANEL: CPT | Performed by: EMERGENCY MEDICINE

## 2024-05-01 PROCEDURE — 71045 X-RAY EXAM CHEST 1 VIEW: CPT

## 2024-05-01 PROCEDURE — 73590 X-RAY EXAM OF LOWER LEG: CPT

## 2024-05-01 PROCEDURE — 87040 BLOOD CULTURE FOR BACTERIA: CPT | Performed by: EMERGENCY MEDICINE

## 2024-05-01 PROCEDURE — 73660 X-RAY EXAM OF TOE(S): CPT

## 2024-05-01 PROCEDURE — 82948 REAGENT STRIP/BLOOD GLUCOSE: CPT

## 2024-05-01 PROCEDURE — 36415 COLL VENOUS BLD VENIPUNCTURE: CPT | Performed by: EMERGENCY MEDICINE

## 2024-05-01 PROCEDURE — 82010 KETONE BODYS QUAN: CPT | Performed by: EMERGENCY MEDICINE

## 2024-05-01 RX ORDER — CEFTRIAXONE 1 G/50ML
1000 INJECTION, SOLUTION INTRAVENOUS ONCE
Status: COMPLETED | OUTPATIENT
Start: 2024-05-01 | End: 2024-05-01

## 2024-05-01 RX ORDER — DIPHENHYDRAMINE HYDROCHLORIDE 50 MG/ML
50 INJECTION INTRAMUSCULAR; INTRAVENOUS ONCE
Status: COMPLETED | OUTPATIENT
Start: 2024-05-01 | End: 2024-05-01

## 2024-05-01 RX ORDER — GUAIFENESIN/DEXTROMETHORPHAN 100-10MG/5
10 SYRUP ORAL ONCE
Status: COMPLETED | OUTPATIENT
Start: 2024-05-01 | End: 2024-05-01

## 2024-05-01 RX ADMIN — GUAIFENESIN AND DEXTROMETHORPHAN 10 ML: 100; 10 SYRUP ORAL at 22:52

## 2024-05-01 RX ADMIN — DIPHENHYDRAMINE HYDROCHLORIDE 50 MG: 50 INJECTION, SOLUTION INTRAMUSCULAR; INTRAVENOUS at 22:52

## 2024-05-01 RX ADMIN — VANCOMYCIN HYDROCHLORIDE 2000 MG: 10 INJECTION, POWDER, LYOPHILIZED, FOR SOLUTION INTRAVENOUS at 22:06

## 2024-05-01 RX ADMIN — CEFTRIAXONE 1000 MG: 1 INJECTION, SOLUTION INTRAVENOUS at 21:35

## 2024-05-01 RX ADMIN — SODIUM CHLORIDE 500 ML: 0.9 INJECTION, SOLUTION INTRAVENOUS at 22:52

## 2024-05-02 ENCOUNTER — OFFICE VISIT (OUTPATIENT)
Age: 69
End: 2024-05-02
Payer: MEDICARE

## 2024-05-02 VITALS
DIASTOLIC BLOOD PRESSURE: 66 MMHG | WEIGHT: 136.01 LBS | BODY MASS INDEX: 19.04 KG/M2 | SYSTOLIC BLOOD PRESSURE: 112 MMHG | RESPIRATION RATE: 17 BRPM | HEART RATE: 91 BPM | HEIGHT: 71 IN

## 2024-05-02 VITALS
RESPIRATION RATE: 19 BRPM | HEART RATE: 106 BPM | DIASTOLIC BLOOD PRESSURE: 63 MMHG | OXYGEN SATURATION: 97 % | SYSTOLIC BLOOD PRESSURE: 129 MMHG | TEMPERATURE: 99 F

## 2024-05-02 DIAGNOSIS — E11.42 DIABETIC POLYNEUROPATHY ASSOCIATED WITH TYPE 2 DIABETES MELLITUS (HCC): ICD-10-CM

## 2024-05-02 DIAGNOSIS — E11.621 DIABETIC ULCER OF LEFT MIDFOOT ASSOCIATED WITH TYPE 2 DIABETES MELLITUS, LIMITED TO BREAKDOWN OF SKIN (HCC): Primary | ICD-10-CM

## 2024-05-02 DIAGNOSIS — L03.116 CELLULITIS OF LEFT FOOT: ICD-10-CM

## 2024-05-02 DIAGNOSIS — L97.421 DIABETIC ULCER OF LEFT MIDFOOT ASSOCIATED WITH TYPE 2 DIABETES MELLITUS, LIMITED TO BREAKDOWN OF SKIN (HCC): Primary | ICD-10-CM

## 2024-05-02 DIAGNOSIS — M21.962 ACQUIRED DEFORMITY OF BOTH FEET: ICD-10-CM

## 2024-05-02 DIAGNOSIS — M21.961 ACQUIRED DEFORMITY OF BOTH FEET: ICD-10-CM

## 2024-05-02 PROCEDURE — 87147 CULTURE TYPE IMMUNOLOGIC: CPT | Performed by: PODIATRIST

## 2024-05-02 PROCEDURE — 99214 OFFICE O/P EST MOD 30 MIN: CPT | Performed by: PODIATRIST

## 2024-05-02 PROCEDURE — 87077 CULTURE AEROBIC IDENTIFY: CPT | Performed by: PODIATRIST

## 2024-05-02 PROCEDURE — 96366 THER/PROPH/DIAG IV INF ADDON: CPT

## 2024-05-02 PROCEDURE — 87070 CULTURE OTHR SPECIMN AEROBIC: CPT | Performed by: PODIATRIST

## 2024-05-02 PROCEDURE — 87186 SC STD MICRODIL/AGAR DIL: CPT | Performed by: PODIATRIST

## 2024-05-02 PROCEDURE — 87070 CULTURE OTHR SPECIMN AEROBIC: CPT | Performed by: EMERGENCY MEDICINE

## 2024-05-02 PROCEDURE — 87205 SMEAR GRAM STAIN: CPT | Performed by: EMERGENCY MEDICINE

## 2024-05-02 PROCEDURE — 87147 CULTURE TYPE IMMUNOLOGIC: CPT | Performed by: EMERGENCY MEDICINE

## 2024-05-02 PROCEDURE — 87205 SMEAR GRAM STAIN: CPT | Performed by: PODIATRIST

## 2024-05-02 RX ORDER — CEPHALEXIN 500 MG/1
500 CAPSULE ORAL EVERY 6 HOURS SCHEDULED
Qty: 28 CAPSULE | Refills: 0 | Status: SHIPPED | OUTPATIENT
Start: 2024-05-02 | End: 2024-05-09

## 2024-05-02 RX ORDER — BENZONATATE 100 MG/1
100 CAPSULE ORAL EVERY 8 HOURS
Qty: 21 CAPSULE | Refills: 0 | Status: SHIPPED | OUTPATIENT
Start: 2024-05-02

## 2024-05-02 NOTE — DISCHARGE INSTRUCTIONS
Return to the ER for further concerns or worsening symptoms  Follow up with your primary care physician and podiatrist in 1-2 days  Take medication as prescribed

## 2024-05-02 NOTE — PROGRESS NOTES
Assessment/Plan: Lin grade 1 ulcer submetatarsal 2 left foot.  Surrounding cellulitis.  Diabetic neuropathy.  Patient with acquired deformity of foot/claw toe bilateral.  Subluxed second MPJ left as cause of etiology and pressure for ongoing ulcerative lesions left foot.    Plan.  Chart reviewed.  PCP notes reviewed.  Lab work reviewed.  Prior wound care notes reviewed.  At this time patient culture and sensitivity done.  Finish present antibiotic.  Await for culture and sensitivity results to titrate antibiotic.  Today Maxorb and dry sterile dressing applied.  This will stay intact for 72 hours.  After that patient will bandage daily.  Patient is referred to wound healing center.  Follow-up there.  Patient is asking about etiology and surgical intervention of present wound.  Patient has claw toe that may need surgical intervention.  At this time patient is advised to wear Aircast as directed.  Watch for signs of increasing infection.  Return for follow-up.         Diagnoses and all orders for this visit:    Diabetic ulcer of left midfoot associated with type 2 diabetes mellitus, limited to breakdown of skin (HCC)  -     Ambulatory Referral to Wound Care; Future    Diabetic polyneuropathy associated with type 2 diabetes mellitus (HCC)    Acquired deformity of both feet    Cellulitis of left foot          Subjective: Patient is diabetic.  He has wound of the left foot.  Patient had multiple wounds of the left foot that required significant wound care over time.  He presents for second opinion.  He is presently taking antibiosis as prescribed by urgent care.  No history of fever night sweats.            Allergies   Allergen Reactions    Sulfa Antibiotics Hives    Daptomycin Other (See Comments)     Possibly contributed to ABILIO on 6/2023 admission          Current Outpatient Medications:     benzonatate (TESSALON PERLES) 100 mg capsule, Take 1 capsule (100 mg total) by mouth every 8 (eight) hours, Disp: 21 capsule,  Rfl: 0    bumetanide (BUMEX) 1 mg tablet, Take 1 tablet (1 mg total) by mouth daily Do not start before February 29, 2024., Disp: 30 tablet, Rfl: 0    cephalexin (KEFLEX) 500 mg capsule, Take 1 capsule (500 mg total) by mouth every 6 (six) hours for 7 days, Disp: 28 capsule, Rfl: 0    fluticasone (FLONASE) 50 mcg/act nasal spray, 1 spray into each nostril daily, Disp: 16 g, Rfl: 0    glimepiride (AMARYL) 4 mg tablet, Take 4 mg by mouth 2 (two) times a day, Disp: , Rfl:     insulin glargine (LANTUS) 100 units/mL subcutaneous injection, Inject 40 Units under the skin daily at bedtime, Disp: , Rfl:     Insulin Pen Needle (BD Pen Needle Nayla 2nd Gen) 32G X 4 MM MISC, For use with insulin pen. Pharmacy may dispense brand covered by insurance., Disp: 100 each, Rfl: 0    Multiple Vitamin (multivitamin) capsule, Take 1 capsule by mouth daily, Disp: 21 capsule, Rfl: 0    pantoprazole (PROTONIX) 40 mg tablet, Take 1 tablet (40 mg total) by mouth daily, Disp: 90 tablet, Rfl: 0    potassium chloride (Klor-Con M20) 20 mEq tablet, Take 1 tablet (20 mEq total) by mouth daily Do not start before February 29, 2024., Disp: 30 tablet, Rfl: 0    tamsulosin (FLOMAX) 0.4 mg, Take 1 capsule (0.4 mg total) by mouth daily with dinner, Disp: 30 capsule, Rfl: 0    traZODone (DESYREL) 50 mg tablet, Take 50 mg by mouth daily at bedtime bedtime, Disp: , Rfl:   No current facility-administered medications for this visit.    Patient Active Problem List   Diagnosis    Alcohol abuse    DM2 (diabetes mellitus, type 2) (HCC)    Essential hypertension    ABILIO (acute kidney injury) (HCC)    Cholelithiasis    Cirrhosis, alcoholic (HCC)    MEG (obstructive sleep apnea)    Pancytopenia (HCC)    Insomnia    Elevated troponin    Chest pressure    Diabetic ulcer of left foot associated with type 2 diabetes mellitus (HCC)    Cellulitis    Obesity, morbid (HCC)    Post-traumatic osteoarthritis of left shoulder    Morbid obesity (HCC)    S/P reverse total  shoulder arthroplasty, left    Aftercare following left shoulder joint replacement surgery    Ulcer of left foot, limited to breakdown of skin (HCC)    Postoperative infection of surgical wound    Stage 3a chronic kidney disease (HCC)    Murmur    Urinary retention    Pyogenic arthritis of left shoulder region (HCC)    Perineal abscess    Symptomatic anemia    Volume overload    Chest wall mass    Iron deficiency anemia due to chronic blood loss    Splenomegaly, congestive, chronic          Patient ID: Isael Avendano is a 68 y.o. male.    HPI    The following portions of the patient's history were reviewed and updated as appropriate: He  has a past medical history of Arrhythmia, Chronic kidney disease, COVID (12/2020), CPAP (continuous positive airway pressure) dependence, Diabetes mellitus (HCC), History of echocardiogram (11/14/2017), History of Holter monitoring (11/21/2017), History of transfusion (04/2023), Hypertension, Liver disease, Murmur, cardiac, Obese, and Sleep apnea.  He   Patient Active Problem List    Diagnosis Date Noted    Iron deficiency anemia due to chronic blood loss 03/14/2024    Splenomegaly, congestive, chronic 03/14/2024    Volume overload 02/23/2024    Chest wall mass 02/23/2024    Symptomatic anemia 02/18/2024    Perineal abscess 12/19/2023    Pyogenic arthritis of left shoulder region (HCC) 08/17/2023    Urinary retention 06/20/2023    Stage 3a chronic kidney disease (HCC) 06/15/2023    Murmur 06/15/2023    Postoperative infection of surgical wound 06/11/2023    Ulcer of left foot, limited to breakdown of skin ([facility]) 05/30/2023    Aftercare following left shoulder joint replacement surgery 04/17/2023    S/P reverse total shoulder arthroplasty, left 04/04/2023    Obesity, morbid (HCC) 12/08/2022    Post-traumatic osteoarthritis of left shoulder 12/08/2022    Morbid obesity (HCC) 12/08/2022    Cellulitis 09/14/2022    Diabetic ulcer of left foot associated with type 2 diabetes mellitus ([facility])  05/17/2022    Insomnia 12/29/2020    Elevated troponin 12/29/2020    Chest pressure 12/29/2020    Alcohol abuse 12/25/2020    DM2 (diabetes mellitus, type 2) ([facility]) 12/25/2020    Essential hypertension 12/25/2020    ABILIO (acute kidney injury) ([facility]) 12/25/2020    Cholelithiasis 12/25/2020    Cirrhosis, alcoholic ([facility]) 12/25/2020    MEG (obstructive sleep apnea) 12/25/2020    Pancytopenia ([facility]) 12/25/2020     He  has a past surgical history that includes Eye surgery (Left, 1997); Knee Arthroplasty (Right, 2008); Hernia repair ([id number]); Shoulder surgery (Right, 2002); Cataract extraction; pr arthroplasty glenohumeral joint total shoulder (Left, 04/04/2023); Wound debridement (Left, 05/02/2023); pr marge shoulder arthrplsty humeral/glenoid compnt (Left, 06/13/2023); Joint replacement (Right); FL guided needle plac bx/asp/inj (8/28/2023); pr marge shoulder arthrplsty humeral&glenoid compnt (Left, 9/8/2023); IR paracentesis (2/19/2024); and IR paracentesis (2/21/2024).  His family history includes COPD in his father; Diabetes in his mother; Heart disease in his father and paternal grandmother; Other in his father; Prostate cancer (age of onset: 82) in his paternal grandfather; Supraventricular tachycardia in his mother.  He  reports that he quit smoking about 31 years ago. His smoking use included cigarettes. He started smoking about 51 years ago. He has a 10 pack-year smoking history. He has never used smokeless tobacco. He reports that he does not currently use alcohol. He reports that he does not use drugs.  Current Outpatient Medications   Medication Sig Dispense Refill    benzonatate (TESSALON PERLES) 100 mg capsule Take 1 capsule (100 mg total) by mouth every 8 (eight) hours 21 capsule 0    bumetanide (BUMEX) 1 mg tablet Take 1 tablet (1 mg total) by mouth daily Do not start before February 29, 2024. 30 tablet 0    cephalexin (KEFLEX) 500 mg capsule Take 1 capsule (500 mg total) by mouth every 6 (six) hours for 7  days 28 capsule 0    fluticasone (FLONASE) 50 mcg/act nasal spray 1 spray into each nostril daily 16 g 0    glimepiride (AMARYL) 4 mg tablet Take 4 mg by mouth 2 (two) times a day      insulin glargine (LANTUS) 100 units/mL subcutaneous injection Inject 40 Units under the skin daily at bedtime      Insulin Pen Needle (BD Pen Needle Nayla 2nd Gen) 32G X 4 MM MISC For use with insulin pen. Pharmacy may dispense brand covered by insurance. 100 each 0    Multiple Vitamin (multivitamin) capsule Take 1 capsule by mouth daily 21 capsule 0    pantoprazole (PROTONIX) 40 mg tablet Take 1 tablet (40 mg total) by mouth daily 90 tablet 0    potassium chloride (Klor-Con M20) 20 mEq tablet Take 1 tablet (20 mEq total) by mouth daily Do not start before February 29, 2024. 30 tablet 0    tamsulosin (FLOMAX) 0.4 mg Take 1 capsule (0.4 mg total) by mouth daily with dinner 30 capsule 0    traZODone (DESYREL) 50 mg tablet Take 50 mg by mouth daily at bedtime bedtime       No current facility-administered medications for this visit.     Current Outpatient Medications on File Prior to Visit   Medication Sig    benzonatate (TESSALON PERLES) 100 mg capsule Take 1 capsule (100 mg total) by mouth every 8 (eight) hours    bumetanide (BUMEX) 1 mg tablet Take 1 tablet (1 mg total) by mouth daily Do not start before February 29, 2024.    cephalexin (KEFLEX) 500 mg capsule Take 1 capsule (500 mg total) by mouth every 6 (six) hours for 7 days    fluticasone (FLONASE) 50 mcg/act nasal spray 1 spray into each nostril daily    glimepiride (AMARYL) 4 mg tablet Take 4 mg by mouth 2 (two) times a day    insulin glargine (LANTUS) 100 units/mL subcutaneous injection Inject 40 Units under the skin daily at bedtime    Insulin Pen Needle (BD Pen Needle Nayla 2nd Gen) 32G X 4 MM MISC For use with insulin pen. Pharmacy may dispense brand covered by insurance.    Multiple Vitamin (multivitamin) capsule Take 1 capsule by mouth daily    pantoprazole (PROTONIX) 40 mg  tablet Take 1 tablet (40 mg total) by mouth daily    potassium chloride (Klor-Con M20) 20 mEq tablet Take 1 tablet (20 mEq total) by mouth daily Do not start before February 29, 2024.    tamsulosin (FLOMAX) 0.4 mg Take 1 capsule (0.4 mg total) by mouth daily with dinner    traZODone (DESYREL) 50 mg tablet Take 50 mg by mouth daily at bedtime bedtime    [DISCONTINUED] fluticasone-salmeterol (ADVAIR HFA) 115-21 MCG/ACT inhaler Inhale 2 puffs 2 (two) times a day Rinse mouth after use. (Patient not taking: Reported on 7/15/2021)    [DISCONTINUED] Glyxambi 25-5 MG TABS  (Patient not taking: Reported on 7/15/2021)     Current Facility-Administered Medications on File Prior to Visit   Medication    [COMPLETED] cefTRIAXone (ROCEPHIN) IVPB (premix in dextrose) 1,000 mg 50 mL    [COMPLETED] dextromethorphan-guaiFENesin (ROBITUSSIN DM) oral syrup 10 mL    [COMPLETED] diphenhydrAMINE (BENADRYL) injection 50 mg    [COMPLETED] sodium chloride 0.9 % bolus 500 mL    [COMPLETED] vancomycin (VANCOCIN) 2,000 mg in sodium chloride 0.9 % 500 mL IVPB     He is allergic to sulfa antibiotics and daptomycin..    Vitals:    05/02/24 0946   BP: 112/66   Pulse: 91   Resp: 17       Review of Systems      Objective:  Patient's shoes and socks removed.   Foot Exam    General  General Appearance: appears stated age and healthy   Orientation: alert and oriented to person, place, and time   Affect: appropriate       Right Foot/Ankle     Inspection and Palpation  Tenderness: none   Swelling: dorsum   Arch: pes cavus  Claw Toes: second toe, fifth toe, fourth toe and third toe  Skin Exam: dry skin;     Neurovascular  Dorsalis pedis: 3+  Posterior tibial: 3+  Saphenous nerve sensation: absent  Tibial nerve sensation: absent  Superficial peroneal nerve sensation: absent  Deep peroneal nerve sensation: absent  Sural nerve sensation: absent      Left Foot/Ankle      Inspection and Palpation  Tenderness: none   Swelling: dorsum   Arch: pes cavus  Claw toes:  second toe, fifth toe and fourth toe  Skin Exam: drainage and ulcer;     Neurovascular  Dorsalis pedis: 3+  Posterior tibial: 3+  Saphenous nerve sensation: absent  Tibial nerve sensation: absent  Superficial peroneal nerve sensation: absent  Deep peroneal nerve sensation: absent  Sural nerve sensation: absent      Physical Exam  Vitals and nursing note reviewed.   Constitutional:       Appearance: Normal appearance.   Cardiovascular:      Rate and Rhythm: Normal rate and regular rhythm.      Pulses: no weak pulses.           Dorsalis pedis pulses are 3+ on the right side and 3+ on the left side.        Posterior tibial pulses are 3+ on the right side and 3+ on the left side.   Feet:      Right foot:      Skin integrity: Dry skin present.      Left foot:      Skin integrity: Ulcer present.   Skin:     Capillary Refill: Capillary refill takes less than 2 seconds.      Comments: Patient has a large Lin grade 1 ulcer submetatarsal 2 left foot.  This is secondary to subluxed second MPJ.  There is appearance of prior bullae and surrounding erythema noted.  Positive cellulitis noted within the area.  Negative occult sinus or abscess noted.   Neurological:      Mental Status: He is alert.   Psychiatric:         Mood and Affect: Mood normal.         Behavior: Behavior normal.         Thought Content: Thought content normal.         Judgment: Judgment normal.     Patient's shoes and socks removed.    Right Foot/Ankle   Right Foot Inspection  Skin Exam: dry skin.     Sensory   Vibration: absent  Proprioception: absent  Monofilament testing: absent    Vascular  Capillary refills: < 3 seconds  The right DP pulse is 3+. The right PT pulse is 3+.     Right Toe  - Comprehensive Exam  Arch: pes cavus  Claw Toes: second toe, fifth toe, fourth toe and third toe  Swelling: dorsum   Tenderness: none       Left Foot/Ankle  Left Foot Inspection  Skin Exam: ulcer.     Sensory   Vibration: absent  Proprioception: absent  Monofilament  testing: absent    Vascular  Capillary refills: < 3 seconds  The left DP pulse is 3+. The left PT pulse is 3+.     Left Toe  - Comprehensive Exam  Arch: pes cavus  Claw toes: second toe, fifth toe and fourth toe  Swelling: dorsum   Tenderness: none       Assign Risk Category  Deformity present  Loss of protective sensation  No weak pulses  Risk: 2

## 2024-05-02 NOTE — ED PROVIDER NOTES
History  Chief Complaint   Patient presents with    Wound Check     Has oozing to R leg, redness to both legs. Wife concerned that legs are more red, diabetic. States sugars have been a little up    Cellulitis     Patient is a 68-year-old male with a history of diabetes, CKD, nonhealing left foot ulcer, chronic venous stasis that presents to the emergency department with complaint of oozing from both legs and increasing redness to top of left second toe.  Patient states that he wiped his legs with a towel after his shower today, which he typically does not do, which caused his skin to peel.  He also has a recurrent left foot ulcer, which is stable.  He complains of a nonproductive cough that has been ongoing for approximately 1 week.      History provided by:  Patient   used: No    Wound Check         Prior to Admission Medications   Prescriptions Last Dose Informant Patient Reported? Taking?   Insulin Pen Needle (BD Pen Needle Nayla 2nd Gen) 32G X 4 MM MISC  Self No No   Sig: For use with insulin pen. Pharmacy may dispense brand covered by insurance.   Multiple Vitamin (multivitamin) capsule  Self No No   Sig: Take 1 capsule by mouth daily   bumetanide (BUMEX) 1 mg tablet   No No   Sig: Take 1 tablet (1 mg total) by mouth daily Do not start before 2024.   fluticasone (FLONASE) 50 mcg/act nasal spray   No No   Si spray into each nostril daily   glimepiride (AMARYL) 4 mg tablet  Self Yes No   Sig: Take 4 mg by mouth 2 (two) times a day   insulin glargine (LANTUS) 100 units/mL subcutaneous injection   Yes No   Sig: Inject 40 Units under the skin daily at bedtime   pantoprazole (PROTONIX) 40 mg tablet   No No   Sig: Take 1 tablet (40 mg total) by mouth daily   potassium chloride (Klor-Con M20) 20 mEq tablet   No No   Sig: Take 1 tablet (20 mEq total) by mouth daily Do not start before 2024.   tamsulosin (FLOMAX) 0.4 mg  Self No No   Sig: Take 1 capsule (0.4 mg total) by  mouth daily with dinner   traZODone (DESYREL) 50 mg tablet  Self Yes No   Sig: Take 50 mg by mouth daily at bedtime bedtime      Facility-Administered Medications: None       Past Medical History:   Diagnosis Date    Arrhythmia     Chronic kidney disease     COVID 12/2020    CPAP (continuous positive airway pressure) dependence     Diabetes mellitus (HCC)     History of echocardiogram 11/14/2017    showed EF of 50-55 percentWith moderate LVH and left ventricle diastolic dysfunction. Left atrium was moderately enlarged. Trace MR noted.     History of Holter monitoring 11/21/2017    showed baseline rhythm of sinus origin with an average heart it of 61 bpm. The lowest heart rate was 49 and the highest heart rate was 10 8 bpm. There were rare single VPCs, and frequent PACs representing 3.2% of total beats. There were several episodes of sinus arrhythmias with sinus bradycardia and heart rate ranging from 40-90 bpm. No sustained dysrhythmias, or pauses noted. The patient did not    History of transfusion 04/2023    platelets    Hypertension     Liver disease     cirhosis    Murmur, cardiac     Obese     Sleep apnea        Past Surgical History:   Procedure Laterality Date    CATARACT EXTRACTION      EYE SURGERY Left 1997    FL GUIDED NEEDLE PLAC BX/ASP/INJ  8/28/2023    HERNIA REPAIR  [id number]    IR PARACENTESIS  2/19/2024    IR PARACENTESIS  2/21/2024    JOINT REPLACEMENT Right     TKR    KNEE ARTHROPLASTY Right 2008    TX ARTHROPLASTY GLENOHUMERAL JOINT TOTAL SHOULDER Left 04/04/2023    Procedure: ARTHROPLASTY SHOULDER REVERSE;  Surgeon: Marcelo Lester MD;  Location:  MAIN OR;  Service: Orthopedics    TX JARED SHOULDER ARTHRPLSTY HUMERAL&GLENOID COMPNT Left 9/8/2023    Procedure: removal of antibiotic spacer, incision and debridement,  with revision reverse shoulder arthroplasty;  Surgeon: Lita Aguilar;  Location:  MAIN OR;  Service: Orthopedics    TX JARED SHOULDER ARTHRPLSTY HUMERAL/GLENOID COMPNT Left  2023    Procedure: ARTHROPLASTY SHOULDER REVISION;  Surgeon: Lita Aguilar;  Location: [facility];  Service: Orthopedics    SHOULDER SURGERY Right 2002    WOUND DEBRIDEMENT Left 2023    Procedure: INCISION AND DRAINAGE (I&D) EXTREMITY, vac placement;  Surgeon: Marcelo Lester MD;  Location: [facility];  Service: Orthopedics       Family History   Problem Relation Age of Onset    Diabetes Mother     Supraventricular tachycardia Mother     COPD Father     Heart disease Father     Other Father         Sepsis; related to UTI with complicating cardiac problems    Heart disease Paternal Grandmother     Prostate cancer Paternal Grandfather 82     I have reviewed and agree with the history as documented.    E-Cigarette/Vaping    E-Cigarette Use Never User      E-Cigarette/Vaping Substances    Nicotine No     THC No     CBD No     Flavoring No     Other No     Unknown No      Social History     Tobacco Use    Smoking status: Former     Current packs/day: 0.00     Average packs/day: 0.5 packs/day for 20.0 years (10.0 ttl pk-yrs)     Types: Cigarettes     Start date:      Quit date:      Years since quittin.3    Smokeless tobacco: Never   Vaping Use    Vaping status: Never Used   Substance Use Topics    Alcohol use: Not Currently     Comment: quit     Drug use: Never       Review of Systems   Constitutional:  Negative for chills and fever.   Respiratory:  Negative for cough, shortness of breath and wheezing.    Cardiovascular:  Negative for chest pain and palpitations.   Gastrointestinal:  Negative for abdominal pain, constipation, diarrhea, nausea and vomiting.   Genitourinary:  Negative for dysuria, flank pain, hematuria and urgency.   Musculoskeletal:  Positive for gait problem. Negative for back pain.   Skin:  Positive for color change and wound. Negative for rash.   All other systems reviewed and are negative.      Physical Exam  Physical Exam  Vitals and nursing note reviewed.    Constitutional:       Appearance: He is well-developed.   HENT:      Head: Normocephalic and atraumatic.   Eyes:      Pupils: Pupils are equal, round, and reactive to light.   Cardiovascular:      Rate and Rhythm: Normal rate and regular rhythm.      Heart sounds: Normal heart sounds.   Pulmonary:      Effort: Pulmonary effort is normal.      Breath sounds: Normal breath sounds.   Abdominal:      General: Bowel sounds are normal. There is no distension.      Palpations: Abdomen is soft. There is no mass.      Tenderness: There is no abdominal tenderness. There is no guarding or rebound.   Musculoskeletal:         General: Tenderness present.      Right lower leg: Tenderness present.      Left lower leg: Tenderness present.        Feet:       Comments: Circumferential erythema noted as pictured above   Feet:      Left foot:      Skin integrity: Ulcer and erythema present.   Skin:     General: Skin is warm and dry.      Capillary Refill: Capillary refill takes less than 2 seconds.      Comments: Chronic venous stasis dermatitis noted to bilateral lower extremity, with circumferential erythema and clear drainage.   Neurological:      General: No focal deficit present.      Mental Status: He is alert and oriented to person, place, and time.   Psychiatric:         Behavior: Behavior normal.         Thought Content: Thought content normal.         Judgment: Judgment normal.                           Vital Signs  ED Triage Vitals [05/01/24 2038]   Temperature Pulse Respirations Blood Pressure SpO2   99 °F (37.2 °C) 101 22 138/70 100 %      Temp Source Heart Rate Source Patient Position - Orthostatic VS BP Location FiO2 (%)   Tympanic Monitor Sitting Right arm --      Pain Score       1           Vitals:    05/01/24 2315 05/01/24 2345 05/02/24 0015 05/02/24 0045   BP: 168/78 121/60 129/59 129/63   Pulse: (!) 118 (!) 118 (!) 116 (!) 106   Patient Position - Orthostatic VS: Lying Sitting Sitting Sitting         Visual  Acuity      ED Medications  Medications   cefTRIAXone (ROCEPHIN) IVPB (premix in dextrose) 1,000 mg 50 mL (0 mg Intravenous Stopped 5/1/24 2205)   vancomycin (VANCOCIN) 2,000 mg in sodium chloride 0.9 % 500 mL IVPB (0 mg Intravenous Stopped 5/2/24 0050)   sodium chloride 0.9 % bolus 500 mL (0 mL Intravenous Stopped 5/2/24 0001)   dextromethorphan-guaiFENesin (ROBITUSSIN DM) oral syrup 10 mL (10 mL Oral Given 5/1/24 2252)   diphenhydrAMINE (BENADRYL) injection 50 mg (50 mg Intravenous Given 5/1/24 2252)       Diagnostic Studies  Results Reviewed       Procedure Component Value Units Date/Time    Wound culture and Gram stain [[id number]] Collected: 05/02/24 0055    Lab Status: Preliminary result Specimen: Wound from Leg, Right Updated: 05/03/24 1051     Wound Culture Culture results to follow.     Gram Stain Result Rare Polys      No bacteria seen    Blood culture #1 [[id number]] Collected: 05/01/24 2132    Lab Status: Preliminary result Specimen: Blood from Hand, Right Updated: 05/03/24 1001     Blood Culture No Growth at 24 hrs.    Blood culture #2 [[id number]] Collected: 05/01/24 2120    Lab Status: Preliminary result Specimen: Blood from Arm, Right Updated: 05/03/24 1001     Blood Culture No Growth at 24 hrs.    Fingerstick Glucose (POCT) [[id number]]  (Abnormal) Collected: 05/01/24 2339    Lab Status: Final result Specimen: Blood Updated: 05/01/24 2340     POC Glucose 284 mg/dl     Beta Hydroxybutyrate [[id number]]  (Normal) Collected: 05/01/24 2120    Lab Status: Final result Specimen: Blood from Arm, Right Updated: 05/01/24 2221     Beta- Hydroxybutyrate <0.05 mmol/L     Comprehensive metabolic panel [[id number]]  (Abnormal) Collected: 05/01/24 2120    Lab Status: Final result Specimen: Blood from Arm, Right Updated: 05/01/24 2209     Sodium 130 mmol/L      Potassium 4.5 mmol/L      Chloride 97 mmol/L      CO2 26 mmol/L      ANION GAP 7 mmol/L      BUN 21 mg/dL      Creatinine 1.24 mg/dL      Glucose 343 mg/dL       Calcium 9.5 mg/dL      Corrected Calcium 10.1 mg/dL      AST 46 U/L      ALT 41 U/L      Alkaline Phosphatase 185 U/L      Total Protein 7.5 g/dL      Albumin 3.3 g/dL      Total Bilirubin 1.92 mg/dL      eGFR 58 ml/min/1.73sq m     Narrative:      National Kid[employer] Foundation guidelines for Chronic Kidney Disease (CKD):     Stage 1 with normal or high GFR (GFR > 90 mL/min/1.73 square meters)    Stage 2 Mild CKD (GFR = 60-89 mL/min/1.73 square meters)    Stage 3A Moderate CKD (GFR = 45-59 mL/min/1.73 square meters)    Stage 3B Moderate CKD (GFR = 30-44 mL/min/1.73 square meters)    Stage 4 Severe CKD (GFR = 15-29 mL/min/1.73 square meters)    Stage 5 End Stage CKD (GFR <15 mL/min/1.73 square meters)  Note: GFR calculation is accurate only with a steady state creatinine    C-reactive protein [[id number]]  (Abnormal) Collected: 05/01/24 2120    Lab Status: Final result Specimen: Blood from Arm, Right Updated: 05/01/24 2209     CRP 23.1 mg/L     Smear Review(Phlebs Do Not Order) [[id number]]  (Abnormal) Collected: 05/01/24 2120    Lab Status: Final result Specimen: Blood from Arm, Right Updated: 05/01/24 2208     RBC Morphology Present     Platelet Estimate Decreased     Large Platelet Present     Anisocytosis Present     Macrocytes Present     Microcytes Present     Polychromasia Present    CBC and differential [[id number]]  (Abnormal) Collected: 05/01/24 2120    Lab Status: Final result Specimen: Blood from Arm, Right Updated: 05/01/24 2208     WBC 3.40 Thousand/uL      RBC 4.23 Million/uL      Hemoglobin 11.4 g/dL      Hematocrit 36.1 %      MCV 85 fL      MCH 27.0 pg      MCHC 31.6 g/dL      RDW 16.4 %      Platelets 68 Thousands/uL      nRBC 0 /100 WBCs      Segmented % 69 %      Immature Grans % 1 %      Lymphocytes % 13 %      Monocytes % 9 %      Eosinophils Relative 7 %      Basophils Relative 1 %      Absolute Neutrophils 2.34 Thousands/µL      Absolute Immature Grans 0.02 Thousand/uL      Absolute  Lymphocytes 0.45 Thousands/µL      Absolute Monocytes 0.31 Thousand/µL      Eosinophils Absolute 0.25 Thousand/µL      Basophils Absolute 0.03 Thousands/µL     Narrative:      This is an appended report.  These results have been appended to a previously verified report.    Lactic acid, plasma (w/reflex if result > 2.0) [[id number]]  (Normal) Collected: 05/01/24 2120    Lab Status: Final result Specimen: Blood from Arm, Right Updated: 05/01/24 2146     LACTIC ACID 1.8 mmol/L     Narrative:      Result may be elevated if tourniquet was used during collection.    Blood gas, venous [[id number]]  (Abnormal) Collected: 05/01/24 2120    Lab Status: Final result Specimen: Blood from Arm, Right Updated: 05/01/24 2140     pH, Gavin 7.378     pCO2, Gavin 39.8 mm Hg      pO2, Gavin 28.0 mm Hg      HCO3, Gavin 22.9 mmol/L      Base Excess, Gavin -2.0 mmol/L      O2 Content, Gavin 9.7 ml/dL      O2 HGB, VENOUS 48.6 %     Fingerstick Glucose (POCT) [[id number]]  (Abnormal) Collected: 05/01/24 2058    Lab Status: Final result Specimen: Blood Updated: 05/01/24 2059     POC Glucose 355 mg/dl                    XR toe second min 2 views LEFT   ED Interpretation by Jean Barragan DO (05/01 2234)   No acute osseous abnormality      Final Result by Paolo Lopez MD (05/03 0708)      No radiographic evidence of osteomyelitis.            Workstation performed: [id number]         XR tibia fibula 2 views RIGHT   ED Interpretation by Jean Barragan DO (05/01 2235)   No acute osseous abnormality      Final Result by Paolo Lopez MD (05/03 0705)      No acute osseous abnormality.            Workstation performed: [id number]         XR tibia fibula 2 views LEFT   ED Interpretation by Jean Barragan DO (05/01 2235)   No acute osseous abnormality      Final Result by Paolo Lopez MD (05/03 0703)      No acute osseous abnormality.            Workstation performed: [id number]         XR chest 1 view  portable   ED Interpretation by Jean Barragan DO (05/01 2235)   Nad      Final Result by Yaneth Leach MD (05/02 1828)      No acute cardiopulmonary disease.            Workstation performed: [id number]                    Procedures  Procedures         ED Course  ED Course as of 05/03/24 2349   Wed May 01, 2024   2246 Patient with complaint of itching to his bilateral lower extremities only.  Will administer Benadryl IV.  I am not concerned for an allergic reaction at this time.  Will continue to monitor.                               SBIRT 22yo+      Flowsheet Row Most Recent Value   Initial Alcohol Screen: US AUDIT-C     1. How often do you have a drink containing alcohol? 0 Filed at: 05/01/2024 2146   2. How many drinks containing alcohol do you have on a typical day you are drinking?  0 Filed at: 05/01/2024 2146   3a. Male UNDER 65: How often do you have five or more drinks on one occasion? 0 Filed at: 05/01/2024 2146   3b. FEMALE Any Age, or MALE 65+: How often do you have 4 or more drinks on one occassion? 0 Filed at: 05/01/2024 2146   Audit-C Score 0 Filed at: 05/01/2024 2146   JERRY: How many times in the past year have you...    Used an illegal drug or used a prescription medication for non-medical reasons? Never Filed at: 05/01/2024 2146                      Medical Decision Making  68-year-old male in the ED with complaint of oozing from his legs.  Mild cellulitis noted to bilateral lower extremities.  Blood cultures, wound cultures and x-rays ordered and reviewed.  Patient is hyperglycemic but not in ketosis.  Administered a dose of Vanco/Rocephin in the ED and will discharge patient to home on a course of Keflex.  Patient states he has an appointment with podiatry in the morning for his nonhealing foot ulcer.  He will keep his previously scheduled appointment.  Return precautions reviewed with patient.    Amount and/or Complexity of Data Reviewed  Labs: ordered.  Radiology: ordered and  independent interpretation performed.    Risk  OTC drugs.  Prescription drug management.             Disposition  Final diagnoses:   Cellulitis - bilateral   Chronic venous stasis dermatitis of both lower extremities   Non-healing ulcer of foot (HCC)   Hyperglycemia   Cough   Hyperglycemia due to diabetes mellitus (HCC)     Time reflects when diagnosis was documented in both MDM as applicable and the Disposition within this note       Time User Action Codes Description Comment    5/2/2024 12:11 AM Oyesanmi, Olubusola O Add [L03.90] Cellulitis     5/2/2024 12:11 AM Oyesanmi, Olubusola O Modify [L03.90] Cellulitis - bilateral     5/2/2024 12:11 AM Oyesanmi, Olubusola O Add [I87.2] Chronic venous stasis dermatitis of both lower extremities     5/2/2024 12:12 AM Oyesanmi, Olubusola O Add [L97.509] Non-healing ulcer of foot (HCC)     5/2/2024 12:16 AM Oyesanmi, Olubusola O Add [E66.01] Obesity, morbid (HCC)     5/2/2024 12:16 AM Oyesanmi, Olubusola O Remove [E66.01] Obesity, morbid (HCC)     5/2/2024 12:49 AM Oyesanmi, Olubusola O Add [R73.9] Hyperglycemia     5/2/2024 12:49 AM Oyesanmi, Olubusola O Add [R05.9] Cough     5/3/2024 11:49 PM Oyesanmi, Olubusola O Add [E11.65] Hyperglycemia due to diabetes mellitus (HCC)           ED Disposition       ED Disposition   Discharge    Condition   Stable    Date/Time   Thu May 2, 2024 0010    Comment   Isael Avendano discharge to home/self care.                   Follow-up Information       Follow up With Specialties Details Why Contact Info    Collin Marcos MD Internal Medicine Schedule an appointment as soon as possible for a visit in 1 day for follow up [address] [facility] [place]  [facility] [place] [address]  [phone]      Obdulio Lucas DPM Podiatry Go today for follow up, as scheduled [address] [place], [place] [id number] [phone]              Discharge Medication List as of 5/2/2024 12:50 AM        START taking these medications    Details   benzonatate (TESSALON PERLES) 100 mg  capsule Take 1 capsule (100 mg total) by mouth every 8 (eight) hours, Starting Thu 5/2/2024, Normal      cephalexin (KEFLEX) 500 mg capsule Take 1 capsule (500 mg total) by mouth every 6 (six) hours for 7 days, Starting Thu 5/2/2024, Until Thu 5/9/2024, Normal           CONTINUE these medications which have NOT CHANGED    Details   bumetanide (BUMEX) 1 mg tablet Take 1 tablet (1 mg total) by mouth daily Do not start before February 29, 2024., Starting Thu 2/29/2024, Normal      fluticasone (FLONASE) 50 mcg/act nasal spray 1 spray into each nostril daily, Starting Thu 2/29/2024, Normal      glimepiride (AMARYL) 4 mg tablet Take 4 mg by mouth 2 (two) times a day, Starting Wed 3/22/2023, Historical Med      insulin glargine (LANTUS) 100 units/mL subcutaneous injection Inject 40 Units under the skin daily at bedtime, Historical Med      Insulin Pen Needle (BD Pen Needle Nayla 2nd Gen) 32G X 4 MM MISC For use with insulin pen. Pharmacy may dispense brand covered by insurance., Normal      Multiple Vitamin (multivitamin) capsule Take 1 capsule by mouth daily, Starting Sun 1/3/2021, Normal      pantoprazole (PROTONIX) 40 mg tablet Take 1 tablet (40 mg total) by mouth daily, Starting Thu 3/21/2024, Until Wed 6/19/2024, Normal      potassium chloride (Klor-Con M20) 20 mEq tablet Take 1 tablet (20 mEq total) by mouth daily Do not start before February 29, 2024., Starting Thu 2/29/2024, Normal      tamsulosin (FLOMAX) 0.4 mg Take 1 capsule (0.4 mg total) by mouth daily with dinner, Starting Wed 6/28/2023, Until Wed 4/24/2024, Normal      traZODone (DESYREL) 50 mg tablet Take 50 mg by mouth daily at bedtime bedtime, Starting Fri 11/11/2022, Historical Med                 PDMP Review         Value Time User    PDMP Reviewed  Yes 2/18/2024  5:22 PM Lou Smith MD            ED Provider  Electronically Signed by             Jean Barragan DO  05/03/24 2348

## 2024-05-02 NOTE — PLAN OF CARE
Problem: HEMATOLOGIC - ADULT  Goal: Maintains hematologic stability  Description: INTERVENTIONS  - Assess for signs and symptoms of bleeding or hemorrhage  - Monitor labs  - Administer supportive blood products/factors as ordered and appropriate  Outcome: Progressing   Patient has been resting in bed with callbell and personal items within reach, bed in low position, no needs or complaints at this time.   "Reason for Visit: Annual Physical Exam    HPI:    Presents for wellness visit  Doing well without any acute concerns  HTN: well controlled, no issues or problems  HLD: well controlled, tries to stay active    Active Problem List  Patient Active Problem List   Diagnosis    Benign essential hypertension    Hyperlipemia, mixed       Comprehensive Medical/Surgical/Social/Family History  No past medical history on file.  Past Surgical History:   Procedure Laterality Date    OTHER SURGICAL HISTORY  11/18/2020    Appendectomy    OTHER SURGICAL HISTORY  11/18/2020    Hernia repair    OTHER SURGICAL HISTORY  11/18/2020    Tonsillectomy     Social History     Tobacco Use    Smoking status: Never    Smokeless tobacco: Never   Vaping Use    Vaping status: Never Used   Substance Use Topics    Alcohol use: Yes     Comment: Mod    Drug use: Never     No family history on file.      Allergies and Medications  Patient has no known allergies.  Current Outpatient Medications on File Prior to Visit   Medication Sig Dispense Refill    amLODIPine (Norvasc) 5 mg tablet Take 1 tablet (5 mg) by mouth once daily. 90 tablet 1    rosuvastatin (Crestor) 10 mg tablet Take 1 tablet (10 mg) by mouth once daily. 90 tablet 0     No current facility-administered medications on file prior to visit.         ROS otherwise negative aside from what was mentioned above in HPI.    Vitals  /68 (BP Location: Left arm, Patient Position: Sitting, BP Cuff Size: Large adult)   Pulse 80   Temp 36.4 °C (97.6 °F) (Temporal)   Ht 1.753 m (5' 9\")   Wt 95.7 kg (211 lb)   SpO2 97%   BMI 31.16 kg/m²   Body mass index is 31.16 kg/m².  Physical Exam  Gen: Alert, NAD  HEENT:  PERRL, EOMI, conjunctiva and sclera normal in appearance. External auditory canals/TMs normal; Oral cavity and posterior pharynx without lesions/exudate  Neck:  Supple with FROM; No masses/nodes palpable; Thyroid nontender and without nodules; No YI  Respiratory:  Lungs " CTAB  Cardiovascular:  Heart RRR. No M/R/G. Peripheral pulses equal bilaterally  Abdomen:  Soft, nontender, No R/G/R; No HSM or masses palpated  Extremities:  FROM all extremities; Muscle strength grossly normal with good tone  Neuro:  CN II-XII intact;  Gross motor and sensory intact  Skin:  No suspicious lesions present    Assessment and Plan:  Problem List Items Addressed This Visit       Benign essential hypertension    Overview     Last Assessment & Plan:    Formatting of this note might be different from the original.   Monitor BP, start prn IV hydralazine         Relevant Orders    Comprehensive Metabolic Panel    CBC    Hyperlipemia, mixed    Relevant Orders    Lipid Panel     Other Visit Diagnoses       Health maintenance examination    -  Primary    Relevant Orders    Comprehensive Metabolic Panel    Lipid Panel    CBC    Prostate Specific Antigen              Venita Ward MD

## 2024-05-02 NOTE — ED NOTES
Right and left lower leg rinsed with NS. Non-stick dressings applied to open areas as per Dr. Barragan's orders. Lower legs wrapped with roller gauze and stretchy bandage placed overtop. Dressings lay smooth on the legs with no folds/wrinkles at this time.          Jacki Cox RN  05/02/24 0058

## 2024-05-05 LAB
BACTERIA WND AEROBE CULT: ABNORMAL
GRAM STN SPEC: ABNORMAL

## 2024-05-07 ENCOUNTER — OFFICE VISIT (OUTPATIENT)
Dept: WOUND CARE | Facility: HOSPITAL | Age: 69
End: 2024-05-07
Payer: MEDICARE

## 2024-05-07 VITALS
BODY MASS INDEX: 31.92 KG/M2 | WEIGHT: 228 LBS | DIASTOLIC BLOOD PRESSURE: 68 MMHG | TEMPERATURE: 96.7 F | HEIGHT: 71 IN | HEART RATE: 91 BPM | SYSTOLIC BLOOD PRESSURE: 119 MMHG

## 2024-05-07 DIAGNOSIS — E11.649 TYPE 2 DIABETES MELLITUS WITH HYPOGLYCEMIA WITHOUT COMA, WITH LONG-TERM CURRENT USE OF INSULIN (HCC): ICD-10-CM

## 2024-05-07 DIAGNOSIS — Z79.4 TYPE 2 DIABETES MELLITUS WITH HYPOGLYCEMIA WITHOUT COMA, WITH LONG-TERM CURRENT USE OF INSULIN (HCC): ICD-10-CM

## 2024-05-07 DIAGNOSIS — L97.522 DIABETIC ULCER OF OTHER PART OF LEFT FOOT ASSOCIATED WITH TYPE 2 DIABETES MELLITUS, WITH FAT LAYER EXPOSED (HCC): Primary | ICD-10-CM

## 2024-05-07 DIAGNOSIS — L97.912 NON-PRESSURE ULCER OF RIGHT LOWER EXTREMITY WITH FAT LAYER EXPOSED (HCC): ICD-10-CM

## 2024-05-07 DIAGNOSIS — E11.621 DIABETIC ULCER OF OTHER PART OF LEFT FOOT ASSOCIATED WITH TYPE 2 DIABETES MELLITUS, WITH FAT LAYER EXPOSED (HCC): Primary | ICD-10-CM

## 2024-05-07 LAB
BACTERIA BLD CULT: NORMAL
BACTERIA BLD CULT: NORMAL

## 2024-05-07 PROCEDURE — 99213 OFFICE O/P EST LOW 20 MIN: CPT | Performed by: STUDENT IN AN ORGANIZED HEALTH CARE EDUCATION/TRAINING PROGRAM

## 2024-05-07 PROCEDURE — 99204 OFFICE O/P NEW MOD 45 MIN: CPT | Performed by: STUDENT IN AN ORGANIZED HEALTH CARE EDUCATION/TRAINING PROGRAM

## 2024-05-07 PROCEDURE — 29445 APPL RIGID TOT CNTC LEG CAST: CPT | Performed by: STUDENT IN AN ORGANIZED HEALTH CARE EDUCATION/TRAINING PROGRAM

## 2024-05-07 PROCEDURE — 97598 DBRDMT OPN WND ADDL 20CM/<: CPT | Performed by: STUDENT IN AN ORGANIZED HEALTH CARE EDUCATION/TRAINING PROGRAM

## 2024-05-07 PROCEDURE — 97597 DBRDMT OPN WND 1ST 20 CM/<: CPT | Performed by: STUDENT IN AN ORGANIZED HEALTH CARE EDUCATION/TRAINING PROGRAM

## 2024-05-07 RX ORDER — LIDOCAINE 40 MG/G
CREAM TOPICAL ONCE
Status: COMPLETED | OUTPATIENT
Start: 2024-05-07 | End: 2024-05-07

## 2024-05-07 RX ADMIN — LIDOCAINE 1 APPLICATION: 40 CREAM TOPICAL at 10:05

## 2024-05-07 NOTE — PATIENT INSTRUCTIONS
Orders Placed This Encounter   Procedures    Wound cleansing and dressings Venous Ulcer Right Pretibial     Right Leg wound;     Wash your hands with soap and water.  Remove old dressing, discard into plastic bag and place in trash.  Cleanse the wound with mild soap and water prior to applying a clean dressing. Do not use tissue or cotton balls. Do not scrub the wound. Pat dry using gauze.  Shower yes, on wound care days, remove dressing to shower, rinse well. Redress immediately do not leave open to air.    Apply adaptic to cover wound bed.  Apply dermagran to cover the adaptic.    Cover with gauze  Secure with rolled gauze and tape  Change dressing every other day.     The above was completed today at the wound center    Elastic Tubular Stocking: Spanda- F to right leg     Tubular elastic bandage: Apply from base of toes to behind the knee. Apply in AM, may remove for sleep.    Avoid prolonged standing in one place.    Elevate leg(s) above the level of the heart when sitting or as much as possible.     Wound infection:  If you have signs of infection please call the wound center.  If the wound center is closed- please go to the Emergency department.  Some signs of infection:  fever, chills, increased redness, red streaks, increase in pain, increased drainage.  Drainage with an odor, Change in drainage color: white/milky/green/tan/yellow,  an increase in swelling, chest pain and/or shortness of breath.     Protein: Eat protein with each meal to promote healing.  Examples of protein are fish, meat, chicken, nuts, peanut butter, eggs, lentils, edamame or a protein shake.     Standing Status:   Future     Standing Expiration Date:   5/14/2024    Wound cleansing and dressings Diabetic Ulcer Left Plantar     Left foot wound:     Off-loading Instructions:    Total Contact Cast Instructions: Do not get cast wet. Contact wound center if there is a foul odor or becomes uncomfortable due to feeling tight or swelling. Do  not use objects down inside of cast to scratch. Do not walk on cast without walking boot. and Keep weight and pressure off wound at all times. Limit walking to only necessity.     Standing Status:   Future     Standing Expiration Date:   5/14/2024    Wound Procedure Treatment Venous Ulcer Right Pretibial     This order was created via procedure documentation    Wound Procedure Treatment Diabetic Ulcer Left Plantar     This order was created via procedure documentation

## 2024-05-07 NOTE — PROGRESS NOTES
Wound Procedure Treatment Venous Ulcer Right Pretibial    Performed by: Mary Duenas RN  Authorized by: Shawnee Wilson MD  Associated wounds:   Wound 05/07/24 Venous Ulcer Pretibial Right  Wound cleansed with:  NSS  Applied primary dressing:  Dermagran and Non adherent contact layer  Applied secondary dressing:  Gauze  Wound secured with:  Nida, Size F and Elastic tubular stocking  Wound Procedure Treatment Diabetic Ulcer Left Plantar    Performed by: Mary Duenas RN  Authorized by: Shawnee Wilson MD  Associated wounds:   Wound 05/07/24 Diabetic Ulcer Plantar Left  Wound cleansed with:  NSS and Dakin's 0.125%  Applied primary dressing:  Collagen dressing, Silver and Silicone bordered foam  Offloading device appllied:  TCC    Right leg wound:   Adaptic applied to wound bed  Dermagran applied over adaptic  Gauze applied over the dermagran  Secured with rolled gauze and tape followed by Spanda- F

## 2024-05-07 NOTE — PROGRESS NOTES
"Patient ID: Isael Avendano is a 68 y.o. male Date of Birth 1955     Chief Complaint  Chief Complaint   Patient presents with    New Patient Visit     Right leg and left foot wounds       Allergies  Sulfa antibiotics and Daptomycin    Assessment:     Diagnoses and all orders for this visit:    Diabetic ulcer of other part of left foot associated with type 2 diabetes mellitus, with fat layer exposed (HCC)  -     Wound cleansing and dressings Venous Ulcer Right Pretibial; Future  -     Wound cleansing and dressings Diabetic Ulcer Left Plantar; Future  -     Wound Procedure Treatment Venous Ulcer Right Pretibial  -     Wound Procedure Treatment Diabetic Ulcer Left Plantar  -     Cast Application    Non-pressure ulcer of right lower extremity with fat layer exposed (HCC)  -     lidocaine (LMX) 4 % cream  -     Wound cleansing and dressings Venous Ulcer Right Pretibial; Future  -     Wound cleansing and dressings Diabetic Ulcer Left Plantar; Future  -     Wound Procedure Treatment Venous Ulcer Right Pretibial  -     Wound Procedure Treatment Diabetic Ulcer Left Plantar  -     Debridement    Type 2 diabetes mellitus with hypoglycemia without coma, with long-term current use of insulin (HCC)              Debridement   Wound 05/07/24 Venous Ulcer Pretibial Right    Universal Protocol:  Consent: Verbal consent obtained.  Risks and benefits: risks, benefits and alternatives were discussed  Consent given by: patient  Time out: Immediately prior to procedure a \"time out\" was called to verify the correct patient, procedure, equipment, support staff and site/side marked as required.  Patient identity confirmed: verbally with patient    Debridement Details  Performed by: physician  Debridement type: selective  Pain control: lidocaine 4%      Post-debridement measurements  Length (cm): 9  Width (cm): 22  Depth (cm): 0.1  Percent debrided: 60%  Surface Area (cm^2): 198  Area Debrided (cm^2): 118.8  Volume (cm^3): " "19.8    Devitalized tissue debrided: biofilm and exudate  Instrument(s) utilized: curette  Bleeding: small  Hemostasis obtained with: pressure  Procedural pain (0-10): 1  Post-procedural pain: 0   Response to treatment: procedure was tolerated well    Cast Application    Date/Time: 5/7/2024 9:15 AM    Performed by: Shawnee Wilson MD  Authorized by: Shawnee Wilson MD  Universal Protocol:  Consent: Verbal consent obtained.  Risks and benefits: risks, benefits and alternatives were discussed  Consent given by: patient  Time out: Immediately prior to procedure a \"time out\" was called to verify the correct patient, procedure, equipment, support staff and site/side marked as required.  Patient identity confirmed: verbally with patient    Pre-procedure details:     Sensation:  Decreased circulation  Procedure details:     Laterality:  Left    Location:  Foot    Foot:  L foot    Strapping: no  Cast type:  Total contact        Supplies:  4 layer wrap    Total Contact Cast Application     TCC was placed over the patient's left foot and lower leg.  The purpose of the TCC is to remove pressure from the foot ulcer upon ambulation and heal the diabetic ulcers. Prior to placing the TCC, consent was obtained and the patient was placed in a position to access the lower leg and foot.  I recommended application of the TCC to facilitate healing particularly in light of the failure to heal previously without TCC.   After application of the primary dressing, a TCC EZ cast was applied with the patient in the supine sitting position and a rigid board utilized on the plantar foot to maintain the ankle at 90 degrees. The kit components were applied as follows: stockinette applied in a smooth non-wrinkled fashion, protective felt over the malleoli, tibial crest, toes and plantar foot to 2\" behind heel and secured in place with tape. Care was taken to have a finger's width excess beyond the longest toe to prevent toe impingement. Following " "this, the protective white sleeve was applied with an excess distally of 3\" and leaving 2\" of excess stockinette proximally. Finally, the cast sock immersed in warm water for 20 seconds and was applied distal to proximal with the foot at a 90 degree angle to the leg.  Care was taken to leave 3\" excess beyond the toes that was folded over and smoothed on the dorsal forefoot.  The cast was smoothed and held on the board in at 90 degrees to leg for 5 minutes. The patient was than sat upright and told to gently rest cast on floor with ankle at 90 degrees for an additional 15 minutes. After complete hardening, the walker boot was applied.        Strict instructions to keep the cast clean, dry and intact were given and also instructions not to introduce powders or foreign objects into the cast was given. Instructions were given to call if any cast breakdown, or any irritation, odor, drainage, swelling or pain was noted within the cast.       I explained that good wound care and compliance are necessary to allow healing and to prevent toe loss or limb loss.       No guarantees of wound healing were given and I explained clearly that there is some risk with the use of TCC such as cast \"rub\", cast ulcers, or hidden infection but that the benefits of the TCC far outweight the risks if patient compliance is in place.       Plan:   It was a pleasure to see Isael Avendano for wound care consult today  Selective debridement performed today as above to RLE wound. TCC as above to left foot  Start plan of care as noted below with adaptic, dermagran to RLE, spandigrip for compression. Dakins soak, Puracol ag, bordered foam, and TCC for offloading to Lin 2 DFU of left forefoot  Complete course of antibiotics prescribed by ER physician.   A1C results reviewed with the patient today.   No signs or symptoms of infection today. Patient understands that if any signs of infection start (such as increased redness, drainage, pain, fever, " chills, diaphoresis), they should call our office or proceed to the ER or Urgent Care.  Patient should continue a high protein diet to facilitate wound healing  Patient is advised to not submerge wound or leave wound open to air.  Follow up in 2 days for cast check  Given the multi-factorial nature of wound care, additional time was taken to review patient's treatment plan with other specialties and most recent pertinent lab work and imaging.   All plans of care discussed with patient at bedside who verbalized understanding with treatment plan.    Wound 05/07/24 Venous Ulcer Pretibial Right (Active)   Wound Image Images linked 05/07/24 0937   Wound Description Epithelialization;Yellow;Pink 05/07/24 0937   Jenn-wound Assessment Erythema 05/07/24 0937   Wound Length (cm) 9 cm 05/07/24 0937   Wound Width (cm) 22 cm 05/07/24 0937   Wound Depth (cm) 0.1 cm 05/07/24 0937   Wound Surface Area (cm^2) 198 cm^2 05/07/24 0937   Wound Volume (cm^3) 19.8 cm^3 05/07/24 0937   Calculated Wound Volume (cm^3) 19.8 cm^3 05/07/24 0937   Drainage Amount Small 05/07/24 0937   Drainage Description Serosanguineous 05/07/24 0937   Non-staged Wound Description Full thickness 05/07/24 0937   Dressing Status Intact 05/07/24 0937       Wound 05/07/24 Diabetic Ulcer Plantar Left (Active)   Enter Lin score: Lin Grade 1: Partial or full-thickness ulcer (superficial) 05/07/24 0938   Wound Image Images linked 05/07/24 0938   Wound Description Brown 05/07/24 0938   Jenn-wound Assessment Callus 05/07/24 0938   Wound Length (cm) 2.5 cm 05/07/24 0938   Wound Width (cm) 0.5 cm 05/07/24 0938   Wound Depth (cm) 0.1 cm 05/07/24 0938   Wound Surface Area (cm^2) 1.25 cm^2 05/07/24 0938   Wound Volume (cm^3) 0.125 cm^3 05/07/24 0938   Calculated Wound Volume (cm^3) 0.13 cm^3 05/07/24 0938   Drainage Amount Scant 05/07/24 0938   Drainage Description Serosanguineous 05/07/24 0938   Non-staged Wound Description Full thickness 05/07/24 0938   Dressing  Status Intact 05/07/24 0938       [REMOVED] Wound 05/07/24 Venous Ulcer Pretibial Distal;Left (Removed)   Wound Image Images linked 05/07/24 0938       Wound 06/13/23 Shoulder Left (Active)   Date First Assessed/Time First Assessed: 06/13/23 1829   Location: Shoulder  Wound Location Orientation: Left  Wound Description (Comments): incision closed with sutures  Incision's 1st Dressing: DRESSING XEROFORM 5 X 9 (x1), SPONGE GAUZE 4 X 4 16 PLY...       Wound 09/08/23 Shoulder Left (Active)   Date First Assessed/Time First Assessed: 09/08/23 1014   Location: Shoulder  Wound Location Orientation: Left  Wound Description (Comments): exofin, steri strips, mepilex, tegaderm, 4x4  immobilizer        Wound 05/07/24 Venous Ulcer Pretibial Right (Active)   Date First Assessed/Time First Assessed: 05/07/24 0932   Primary Wound Type: Venous Ulcer  Location: Pretibial  Wound Location Orientation: Right       Wound 05/07/24 Diabetic Ulcer Plantar Left (Active)   Date First Assessed: 05/07/24   Primary Wound Type: Diabetic Ulcer  Location: Plantar  Wound Location Orientation: Left       [REMOVED] Wound 05/02/23 Shoulder Left (Removed)   Resolved Date: 06/28/23  Date First Assessed/Time First Assessed: 05/02/23 1413   Location: Shoulder  Wound Location Orientation: Left  Wound Description (Comments): wound vac, 1 black sponge, and sling       [REMOVED] Wound 06/24/23 Other (comment) Toe (Comment  which one) Left (Removed)   Resolved Date: 05/07/24  Date First Assessed/Time First Assessed: 06/24/23 0813   Present on Original Admission: (c) No  Primary Wound Type: (c) Other (comment)  Location: (c) Toe (Comment  which one)  Wound Location Orientation: Left  Wound Outcome: ...       [REMOVED] Wound 02/19/24 Toe D5, fifth Anterior;Left (Removed)   Resolved Date: 05/07/24  Date First Assessed/Time First Assessed: 02/19/24 0915   Location: Toe D5, fifth  Wound Location Orientation: Anterior;Left  Wound Outcome: Unknown (No longer present)        [REMOVED] Wound 02/19/24 Toe D4, fourth Anterior;Left (Removed)   Resolved Date: 05/07/24  Date First Assessed/Time First Assessed: 02/19/24 0915   Location: Toe D4, fourth  Wound Location Orientation: Anterior;Left  Wound Outcome: Unknown (No longer present)       [REMOVED] Wound 05/07/24 Venous Ulcer Pretibial Distal;Left (Removed)   Resolved Date/Resolved Time: 05/07/24 0942  Date First Assessed: 05/07/24   Primary Wound Type: Venous Ulcer  Location: Pretibial  Wound Location Orientation: Distal;Left  Wound Outcome: Unknown (No longer present)       Subjective:      .    5/7/24: Consult - Isael is a pleasant 68-year-old male with a past medical history of hypertension, diabetes mellitus most recent hemoglobin A1c 5.7% 2 months ago, diabetic polyneuropathy, stage IIIa CKD, obesity here today for wound care consult.  Patient has been following with Dr. Obdulio Lucas from podiatry for second opinion regarding multiple wounds of his left foot.  Patient was referred by podiatry to wound management for third opinion as well as continued wound care.  Patient notes that he had wounds to the left foot on multiple occasions since 2021.  At one point was being followed by wound management in [place] when he was there and had skin substitute applied which temporarily healed the wound.  Most recently was hospitalized in April 2024 at which time osteomyelitis was confirmed via MRI of the fourth digit left foot.  Patient declined amputation and podiatry discontinued IV antibiotics due to lack of local infection noted.  This wound did heal however in the past month patient noted a new wound of the plantar forefoot of the left foot.  This is most recently seen by original podiatrist Dr Mathur and then consult with Dr. Obdulio Lucas for further recommendations.  At most recent appointment Dr. Lucas patient with advised to see wound management for more continued care.   Patient also notes a new wound of the posterior right lower  extremity that I believe started only this past week when he went into the shower and came out noticed an open wound.  Patient was seen in ER, advised to apply Dermagran which patient and his wife purchased, given a course of Keflex.  Patient without any symptoms of infection today including fever chills diaphoresis.  Believes that wound is much improved.        The following portions of the patient's history were reviewed and updated as appropriate: allergies, current medications, past family history, past medical history, past social history, past surgical history, and problem list.    Review of Systems   Constitutional:  Negative for chills, diaphoresis and fever.   Skin:  Positive for wound.   All other systems reviewed and are negative.        Objective:       Wound 05/07/24 Venous Ulcer Pretibial Right (Active)   Wound Image Images linked 05/07/24 0937   Wound Description Epithelialization;Yellow;Pink 05/07/24 0937   Jenn-wound Assessment Erythema 05/07/24 0937   Wound Length (cm) 9 cm 05/07/24 0937   Wound Width (cm) 22 cm 05/07/24 0937   Wound Depth (cm) 0.1 cm 05/07/24 0937   Wound Surface Area (cm^2) 198 cm^2 05/07/24 0937   Wound Volume (cm^3) 19.8 cm^3 05/07/24 0937   Calculated Wound Volume (cm^3) 19.8 cm^3 05/07/24 0937   Drainage Amount Small 05/07/24 0937   Drainage Description Serosanguineous 05/07/24 0937   Non-staged Wound Description Full thickness 05/07/24 0937   Dressing Status Intact 05/07/24 0937       Wound 05/07/24 Diabetic Ulcer Plantar Left (Active)   Enter Lin score: Lin Grade 1: Partial or full-thickness ulcer (superficial) 05/07/24 0938   Wound Image Images linked 05/07/24 0938   Wound Description Brown 05/07/24 0938   Jenn-wound Assessment Callus 05/07/24 0938   Wound Length (cm) 2.5 cm 05/07/24 0938   Wound Width (cm) 0.5 cm 05/07/24 0938   Wound Depth (cm) 0.1 cm 05/07/24 0938   Wound Surface Area (cm^2) 1.25 cm^2 05/07/24 0938   Wound Volume (cm^3) 0.125 cm^3 05/07/24 0938  "  Calculated Wound Volume (cm^3) 0.13 cm^3 05/07/24 0938   Drainage Amount Scant 05/07/24 0938   Drainage Description Serosanguineous 05/07/24 0938   Non-staged Wound Description Full thickness 05/07/24 0938   Dressing Status Intact 05/07/24 0938       [REMOVED] Wound 05/07/24 Venous Ulcer Pretibial Distal;Left (Removed)   Wound Image Images linked 05/07/24 0938       /68   Pulse 91   Temp (!) 96.7 °F (35.9 °C)   Ht 5' 11\" (1.803 m)   Wt 103 kg (228 lb)   BMI 31.80 kg/m²     Physical Exam  Vitals reviewed.   Constitutional:       Appearance: Normal appearance.   HENT:      Head: Normocephalic and atraumatic.   Eyes:      Extraocular Movements: Extraocular movements intact.   Pulmonary:      Effort: Pulmonary effort is normal.   Musculoskeletal:      Cervical back: Neck supple.      Right lower leg: Edema present.      Left lower leg: Edema present.      Comments: Pigmentation, hyperplasia, lipodermatosclerosis of bilateral lower extremities consistent with chronic venous stasis disease.    On posterior right lower extremity patient has multiple open areas that are full-thickness.  No overt signs of infection however this appears chronic as there are areas of epithelialization and skin islands also noted in the wound.    Plantar left forefoot has an open wound embedded in callus deposition.  This is a full-thickness wound without any signs of infection or probing to bone.  No involvement of the digits however this is hard to discern due to methylene blue applied liberally on the skin.   Neurological:      Mental Status: He is alert.   Psychiatric:         Mood and Affect: Mood normal.                         Wound Instructions:  Orders Placed This Encounter   Procedures    Wound cleansing and dressings Venous Ulcer Right Pretibial     Right Leg wound;     Wash your hands with soap and water.  Remove old dressing, discard into plastic bag and place in trash.  Cleanse the wound with mild soap and water prior " to applying a clean dressing. Do not use tissue or cotton balls. Do not scrub the wound. Pat dry using gauze.  Shower yes, on wound care days, remove dressing to shower, rinse well. Redress immediately do not leave open to air.    Apply adaptic to cover wound bed.  Apply dermagran to cover the adaptic.    Cover with gauze  Secure with rolled gauze and tape  Change dressing every other day.     The above was completed today at the wound center    Elastic Tubular Stocking: Spanda- F to right leg     Tubular elastic bandage: Apply from base of toes to behind the knee. Apply in AM, may remove for sleep.    Avoid prolonged standing in one place.    Elevate leg(s) above the level of the heart when sitting or as much as possible.     Wound infection:  If you have signs of infection please call the wound center.  If the wound center is closed- please go to the Emergency department.  Some signs of infection:  fever, chills, increased redness, red streaks, increase in pain, increased drainage.  Drainage with an odor, Change in drainage color: white/milky/green/tan/yellow,  an increase in swelling, chest pain and/or shortness of breath.     Protein: Eat protein with each meal to promote healing.  Examples of protein are fish, meat, chicken, nuts, peanut butter, eggs, lentils, edamame or a protein shake.     Standing Status:   Future     Standing Expiration Date:   5/14/2024    Wound cleansing and dressings Diabetic Ulcer Left Plantar     Left foot wound:     Off-loading Instructions:    Total Contact Cast Instructions: Do not get cast wet. Contact wound center if there is a foul odor or becomes uncomfortable due to feeling tight or swelling. Do not use objects down inside of cast to scratch. Do not walk on cast without walking boot. and Keep weight and pressure off wound at all times. Limit walking to only necessity.     Standing Status:   Future     Standing Expiration Date:   5/14/2024    Wound Procedure Treatment Venous  "Ulcer Right Pretibial     This order was created via procedure documentation    Wound Procedure Treatment Diabetic Ulcer Left Plantar     This order was created via procedure documentation    Debridement     This order was created via procedure documentation    Cast Application     This order was created via procedure documentation        Diagnosis ICD-10-CM Associated Orders   1. Diabetic ulcer of other part of left foot associated with type 2 diabetes mellitus, with fat layer exposed ([facility])  E11.621 Wound cleansing and dressings Venous Ulcer Right Pretibial    L97.522 Wound cleansing and dressings Diabetic Ulcer Left Plantar     Wound Procedure Treatment Venous Ulcer Right Pretibial     Wound Procedure Treatment Diabetic Ulcer Left Plantar     Cast Application      2. Non-pressure ulcer of right lower extremity with fat layer exposed ([facility])  L97.912 lidocaine (LMX) 4 % cream     Wound cleansing and dressings Venous Ulcer Right Pretibial     Wound cleansing and dressings Diabetic Ulcer Left Plantar     Wound Procedure Treatment Venous Ulcer Right Pretibial     Wound Procedure Treatment Diabetic Ulcer Left Plantar     Debridement      3. Type 2 diabetes mellitus with hypoglycemia without coma, with long-term current use of insulin ([facility])  E11.649     Z79.4           --  Shawnee Wilson MD    \"This note has been constructed using a voice recognition system. Therefore there may be syntax, spelling, and/or grammatical errors. Occasional wrong word or \"sound alike\" substitutions may have occurred due to the inherent limitations of voice recognition software. Read the chart carefully and recognize, using context, where substitutions have occurred. Please call if you have any questions.\"     "

## 2024-05-08 LAB
ALBUMIN SERPL BCP-MCNC: 3.3 G/DL (ref 3.5–5)
ALP SERPL-CCNC: 185 U/L (ref 34–104)
ALT SERPL W P-5'-P-CCNC: 41 U/L (ref 7–52)
ANION GAP SERPL CALCULATED.3IONS-SCNC: 7 MMOL/L (ref 4–13)
AST SERPL W P-5'-P-CCNC: 46 U/L (ref 13–39)
BILIRUB SERPL-MCNC: 1.92 MG/DL (ref 0.2–1)
BUN SERPL-MCNC: 21 MG/DL (ref 5–25)
CALCIUM ALBUM COR SERPL-MCNC: 10.1 MG/DL (ref 8.3–10.1)
CALCIUM SERPL-MCNC: 9.5 MG/DL (ref 8.4–10.2)
CHLORIDE SERPL-SCNC: 97 MMOL/L (ref 96–108)
CO2 SERPL-SCNC: 26 MMOL/L (ref 21–32)
CREAT SERPL-MCNC: 1.24 MG/DL (ref 0.6–1.3)
GFR SERPL CREATININE-BSD FRML MDRD: 58 ML/MIN/1.73SQ M
GLUCOSE SERPL-MCNC: 343 MG/DL (ref 65–140)
POTASSIUM SERPL-SCNC: 4.5 MMOL/L (ref 3.5–5.3)
PROT SERPL-MCNC: 7.5 G/DL (ref 6.4–8.4)
SODIUM SERPL-SCNC: 130 MMOL/L (ref 135–147)

## 2024-05-09 ENCOUNTER — OFFICE VISIT (OUTPATIENT)
Dept: WOUND CARE | Facility: HOSPITAL | Age: 69
End: 2024-05-09
Payer: MEDICARE

## 2024-05-09 VITALS
SYSTOLIC BLOOD PRESSURE: 122 MMHG | DIASTOLIC BLOOD PRESSURE: 74 MMHG | RESPIRATION RATE: 18 BRPM | TEMPERATURE: 96.9 F | HEART RATE: 90 BPM

## 2024-05-09 DIAGNOSIS — L97.912 NON-PRESSURE ULCER OF RIGHT LOWER EXTREMITY WITH FAT LAYER EXPOSED (HCC): ICD-10-CM

## 2024-05-09 DIAGNOSIS — L97.522 DIABETIC ULCER OF OTHER PART OF LEFT FOOT ASSOCIATED WITH TYPE 2 DIABETES MELLITUS, WITH FAT LAYER EXPOSED (HCC): Primary | ICD-10-CM

## 2024-05-09 DIAGNOSIS — E11.649 TYPE 2 DIABETES MELLITUS WITH HYPOGLYCEMIA WITHOUT COMA, WITH LONG-TERM CURRENT USE OF INSULIN (HCC): ICD-10-CM

## 2024-05-09 DIAGNOSIS — Z79.4 TYPE 2 DIABETES MELLITUS WITH HYPOGLYCEMIA WITHOUT COMA, WITH LONG-TERM CURRENT USE OF INSULIN (HCC): ICD-10-CM

## 2024-05-09 DIAGNOSIS — E11.621 DIABETIC ULCER OF OTHER PART OF LEFT FOOT ASSOCIATED WITH TYPE 2 DIABETES MELLITUS, WITH FAT LAYER EXPOSED (HCC): Primary | ICD-10-CM

## 2024-05-09 PROCEDURE — 29445 APPL RIGID TOT CNTC LEG CAST: CPT | Performed by: STUDENT IN AN ORGANIZED HEALTH CARE EDUCATION/TRAINING PROGRAM

## 2024-05-09 RX ORDER — LIDOCAINE 40 MG/G
CREAM TOPICAL ONCE
Status: COMPLETED | OUTPATIENT
Start: 2024-05-09 | End: 2024-05-09

## 2024-05-09 RX ADMIN — LIDOCAINE 1 APPLICATION: 40 CREAM TOPICAL at 08:03

## 2024-05-09 NOTE — PROGRESS NOTES
"Patient ID: Isael Avendano is a 68 y.o. male Date of Birth 1955     Chief Complaint  Chief Complaint   Patient presents with    Follow Up Wound Care Visit     Cast check to left leg       Allergies  Sulfa antibiotics and Daptomycin    Assessment:     Diagnoses and all orders for this visit:    Diabetic ulcer of other part of left foot associated with type 2 diabetes mellitus, with fat layer exposed (HCC)  -     lidocaine (LMX) 4 % cream  -     Wound cleansing and dressings Diabetic Ulcer Left Plantar; Future  -     Wound Procedure Treatment Diabetic Ulcer Left Plantar  -     Cast Application    Non-pressure ulcer of right lower extremity with fat layer exposed (HCC)    Type 2 diabetes mellitus with hypoglycemia without coma, with long-term current use of insulin (HCC)              Cast Application    Date/Time: 5/9/2024 8:00 AM    Performed by: Shawnee Wilson MD  Authorized by: Shawnee Wilson MD  Universal Protocol:  Consent: Verbal consent obtained.  Risks and benefits: risks, benefits and alternatives were discussed  Consent given by: patient  Time out: Immediately prior to procedure a \"time out\" was called to verify the correct patient, procedure, equipment, support staff and site/side marked as required.    Pre-procedure details:     Sensation:  Unchanged  Procedure details:     Laterality:  Left    Location:  Foot    Foot:  L foot    Strapping: no  Cast type:  Total contact        Supplies:  4 layer wrap    Total Contact Cast Application     TCC was placed over the patient's left foot and lower leg.  The purpose of the TCC is to remove pressure from the foot ulcer upon ambulation and heal the diabetic ulcers. Prior to placing the TCC, consent was obtained and the patient was placed in a position to access the lower leg and foot.  I recommended application of the TCC to facilitate healing particularly in light of the failure to heal previously without TCC.   After application of the primary dressing, a TCC EZ " "cast was applied with the patient in the supine sitting position and a rigid board utilized on the plantar foot to maintain the ankle at 90 degrees. The kit components were applied as follows: stockinette applied in a smooth non-wrinkled fashion, protective felt over the malleoli, tibial crest, toes and plantar foot to 2\" behind heel and secured in place with tape. Care was taken to have a finger's width excess beyond the longest toe to prevent toe impingement. Following this, the protective white sleeve was applied with an excess distally of 3\" and leaving 2\" of excess stockinette proximally. Finally, the cast sock immersed in warm water for 20 seconds and was applied distal to proximal with the foot at a 90 degree angle to the leg.  Care was taken to leave 3\" excess beyond the toes that was folded over and smoothed on the dorsal forefoot.  The cast was smoothed and held on the board in at 90 degrees to leg for 5 minutes. The patient was than sat upright and told to gently rest cast on floor with ankle at 90 degrees for an additional 15 minutes. After complete hardening, the walker boot was applied.        Strict instructions to keep the cast clean, dry and intact were given and also instructions not to introduce powders or foreign objects into the cast was given. Instructions were given to call if any cast breakdown, or any irritation, odor, drainage, swelling or pain was noted within the cast.       I explained that good wound care and compliance are necessary to allow healing and to prevent toe loss or limb loss.       No guarantees of wound healing were given and I explained clearly that there is some risk with the use of TCC such as cast \"rub\", cast ulcers, or hidden infection but that the benefits of the TCC far outweight the risks if patient compliance is in place.       Plan:   It was a pleasure to see Isael Avendano for wound care follow up today  Wound debrided with saline and gauze.  TCC application as " above.  Wound is improving   Continue plan of care as noted below with dermagran, bordered foam, TCC for offloading   A1C results reviewed with the patient today.   No signs or symptoms of infection today. Patient understands that if any signs of infection start (such as increased redness, drainage, pain, fever, chills, diaphoresis), they should call our office or proceed to the ER or Urgent Care.  Patient should continue a high protein diet to facilitate wound healing  Patient is advised to not submerge wound or leave wound open to air.  Follow up in 1 weeks  Given the multi-factorial nature of wound care, additional time was taken to review patient's treatment plan with other specialties and most recent pertinent lab work and imaging.   All plans of care discussed with patient at bedside who verbalized understanding with treatment plan.    Wound 05/07/24 Diabetic Ulcer Plantar Left (Active)   Enter Lin score: Lin Grade 1: Partial or full-thickness ulcer (superficial) 05/09/24 0802   Wound Image Images linked 05/09/24 0802   Wound Description Pink;Yellow 05/09/24 0802   Jenn-wound Assessment Callus 05/09/24 0802   Wound Length (cm) 0.8 cm 05/09/24 0802   Wound Width (cm) 0.4 cm 05/09/24 0802   Wound Depth (cm) 0.1 cm 05/09/24 0802   Wound Surface Area (cm^2) 0.32 cm^2 05/09/24 0802   Wound Volume (cm^3) 0.032 cm^3 05/09/24 0802   Calculated Wound Volume (cm^3) 0.03 cm^3 05/09/24 0802   Change in Wound Size % 76.92 05/09/24 0802   Drainage Amount Small 05/09/24 0802   Drainage Description Serosanguineous 05/09/24 0802   Non-staged Wound Description Full thickness 05/09/24 0802   Dressing Status Intact 05/09/24 0802       Wound 06/13/23 Shoulder Left (Active)   Date First Assessed/Time First Assessed: 06/13/23 1829   Location: Shoulder  Wound Location Orientation: Left  Wound Description (Comments): incision closed with sutures  Incision's 1st Dressing: DRESSING XEROFORM 5 X 9 (x1), SPONGE GAUZE 4 X 4 16 PLY...        Wound 09/08/23 Shoulder Left (Active)   Date First Assessed/Time First Assessed: 09/08/23 1014   Location: Shoulder  Wound Location Orientation: Left  Wound Description (Comments): exofin, steri strips, mepilex, tegaderm, 4x4  immobilizer        Wound 05/07/24 Venous Ulcer Pretibial Right (Active)   Date First Assessed/Time First Assessed: 05/07/24 0932   Primary Wound Type: Venous Ulcer  Location: Pretibial  Wound Location Orientation: Right       Wound 05/07/24 Diabetic Ulcer Plantar Left (Active)   Date First Assessed: 05/07/24   Primary Wound Type: Diabetic Ulcer  Location: Plantar  Wound Location Orientation: Left       [REMOVED] Wound 05/02/23 Shoulder Left (Removed)   Resolved Date: 06/28/23  Date First Assessed/Time First Assessed: 05/02/23 1413   Location: Shoulder  Wound Location Orientation: Left  Wound Description (Comments): wound vac, 1 black sponge, and sling       [REMOVED] Wound 06/24/23 Other (comment) Toe (Comment  which one) Left (Removed)   Resolved Date: 05/07/24  Date First Assessed/Time First Assessed: 06/24/23 0813   Present on Original Admission: (c) No  Primary Wound Type: (c) Other (comment)  Location: (c) Toe (Comment  which one)  Wound Location Orientation: Left  Wound Outcome: ...       [REMOVED] Wound 02/19/24 Toe D5, fifth Anterior;Left (Removed)   Resolved Date: 05/07/24  Date First Assessed/Time First Assessed: 02/19/24 0915   Location: Toe D5, fifth  Wound Location Orientation: Anterior;Left  Wound Outcome: Unknown (No longer present)       [REMOVED] Wound 02/19/24 Toe D4, fourth Anterior;Left (Removed)   Resolved Date: 05/07/24  Date First Assessed/Time First Assessed: 02/19/24 0915   Location: Toe D4, fourth  Wound Location Orientation: Anterior;Left  Wound Outcome: Unknown (No longer present)       [REMOVED] Wound 05/07/24 Venous Ulcer Pretibial Distal;Left (Removed)   Resolved Date/Resolved Time: 05/07/24 0942  Date First Assessed: 05/07/24   Primary Wound Type: Venous  Ulcer  Location: Pretibial  Wound Location Orientation: Distal;Left  Wound Outcome: Unknown (No longer present)       Subjective:      .    5/9/24: Patient presents today for 48-hour initial TCC check.  Tolerated well.  Symptoms of infection.    5/7/24: Consult - Isael is a pleasant 68-year-old male with a past medical history of hypertension, diabetes mellitus most recent hemoglobin A1c 5.7% 2 months ago, diabetic polyneuropathy, stage IIIa CKD, obesity here today for wound care consult.  Patient has been following with Dr. Obdulio Lucas from podiatry for second opinion regarding multiple wounds of his left foot.  Patient was referred by podiatry to wound management for third opinion as well as continued wound care.  Patient notes that he had wounds to the left foot on multiple occasions since 2021.  At one point was being followed by wound management in [place] when he was there and had skin substitute applied which temporarily healed the wound.  Most recently was hospitalized in April 2024 at which time osteomyelitis was confirmed via MRI of the fourth digit left foot.  Patient declined amputation and podiatry discontinued IV antibiotics due to lack of local infection noted.  This wound did heal however in the past month patient noted a new wound of the plantar forefoot of the left foot.  This is most recently seen by original podiatrist Dr Mathur and then consult with Dr. Obdulio Lucas for further recommendations.  At most recent appointment Dr. Lucas patient with advised to see wound management for more continued care.   Patient also notes a new wound of the posterior right lower extremity that I believe started only this past week when he went into the shower and came out noticed an open wound.  Patient was seen in ER, advised to apply Dermagran which patient and his wife purchased, given a course of Keflex.  Patient without any symptoms of infection today including fever chills diaphoresis.  Believes that wound is  much improved.          The following portions of the patient's history were reviewed and updated as appropriate: allergies, current medications, past family history, past medical history, past social history, past surgical history, and problem list.    Review of Systems   Constitutional:  Negative for chills, diaphoresis and fever.   Skin:  Positive for wound.   All other systems reviewed and are negative.        Objective:       Wound 05/07/24 Diabetic Ulcer Plantar Left (Active)   Enter Lin score: Lin Grade 1: Partial or full-thickness ulcer (superficial) 05/09/24 0802   Wound Image Images linked 05/09/24 0802   Wound Description Pink;Yellow 05/09/24 0802   Jenn-wound Assessment Callus 05/09/24 0802   Wound Length (cm) 0.8 cm 05/09/24 0802   Wound Width (cm) 0.4 cm 05/09/24 0802   Wound Depth (cm) 0.1 cm 05/09/24 0802   Wound Surface Area (cm^2) 0.32 cm^2 05/09/24 0802   Wound Volume (cm^3) 0.032 cm^3 05/09/24 0802   Calculated Wound Volume (cm^3) 0.03 cm^3 05/09/24 0802   Change in Wound Size % 76.92 05/09/24 0802   Drainage Amount Small 05/09/24 0802   Drainage Description Serosanguineous 05/09/24 0802   Non-staged Wound Description Full thickness 05/09/24 0802   Dressing Status Intact 05/09/24 0802       /74   Pulse 90   Temp (!) 96.9 °F (36.1 °C)   Resp 18     Physical Exam  Vitals reviewed.   Constitutional:       Appearance: Normal appearance.   HENT:      Head: Normocephalic and atraumatic.   Eyes:      Extraocular Movements: Extraocular movements intact.   Pulmonary:      Effort: Pulmonary effort is normal.   Musculoskeletal:      Cervical back: Neck supple.   Skin:     Comments: Venous leg wound not observed today.  Patient in compression wraps.    TCC taken down - underneath wound is improved with more distal aspect of the wound with significant epithelialization.  Still with callus deposition and increased since last exam.   Neurological:      Mental Status: He is alert.   Psychiatric:    "      Mood and Affect: Mood normal.           Wound Instructions:  Orders Placed This Encounter   Procedures    Wound cleansing and dressings Diabetic Ulcer Left Plantar     Off-loading Instructions:    Total Contact Cast Instructions: Do not get cast wet. Contact wound center if there is a foul odor or becomes uncomfortable due to feeling tight or swelling. Do not use objects down inside of cast to scratch. Do not walk on cast without walking boot.    Limit walking to only necessity.     Standing Status:   Future     Standing Expiration Date:   5/16/2024    Wound Procedure Treatment Diabetic Ulcer Left Plantar     This order was created via procedure documentation    Cast Application     This order was created via procedure documentation        Diagnosis ICD-10-CM Associated Orders   1. Diabetic ulcer of other part of left foot associated with type 2 diabetes mellitus, with fat layer exposed ([facility])  E11.621 lidocaine (LMX) 4 % cream    L97.522 Wound cleansing and dressings Diabetic Ulcer Left Plantar     Wound Procedure Treatment Diabetic Ulcer Left Plantar     Cast Application      2. Non-pressure ulcer of right lower extremity with fat layer exposed ([facility])  L97.912       3. Type 2 diabetes mellitus with hypoglycemia without coma, with long-term current use of insulin ([facility])  E11.649     Z79.4           --  Shawnee Wilson MD    \"This note has been constructed using a voice recognition system. Therefore there may be syntax, spelling, and/or grammatical errors. Occasional wrong word or \"sound alike\" substitutions may have occurred due to the inherent limitations of voice recognition software. Read the chart carefully and recognize, using context, where substitutions have occurred. Please call if you have any questions.\"     "

## 2024-05-09 NOTE — PROGRESS NOTES
Wound Procedure Treatment Diabetic Ulcer Left Plantar    Performed by: Mary Duenas RN  Authorized by: Shawnee Wilson MD  Associated wounds:   Wound 05/07/24 Diabetic Ulcer Plantar Left  Wound cleansed with:  NSS  Applied primary dressing:  Dermagran and Silicone bordered foam  Offloading device appllied:  TCC

## 2024-05-09 NOTE — PATIENT INSTRUCTIONS
Orders Placed This Encounter   Procedures    Wound cleansing and dressings Diabetic Ulcer Left Plantar     Off-loading Instructions:    Total Contact Cast Instructions: Do not get cast wet. Contact wound center if there is a foul odor or becomes uncomfortable due to feeling tight or swelling. Do not use objects down inside of cast to scratch. Do not walk on cast without walking boot.    Limit walking to only necessity.     Standing Status:   Future     Standing Expiration Date:   5/16/2024

## 2024-05-14 ENCOUNTER — OFFICE VISIT (OUTPATIENT)
Dept: WOUND CARE | Facility: HOSPITAL | Age: 69
End: 2024-05-14
Payer: MEDICARE

## 2024-05-14 VITALS
DIASTOLIC BLOOD PRESSURE: 72 MMHG | SYSTOLIC BLOOD PRESSURE: 136 MMHG | TEMPERATURE: 97.2 F | RESPIRATION RATE: 15 BRPM | HEART RATE: 91 BPM

## 2024-05-14 DIAGNOSIS — E11.621 DIABETIC ULCER OF OTHER PART OF LEFT FOOT ASSOCIATED WITH TYPE 2 DIABETES MELLITUS, WITH FAT LAYER EXPOSED (HCC): Primary | ICD-10-CM

## 2024-05-14 DIAGNOSIS — I87.311 CHRONIC VENOUS HYPERTENSION (IDIOPATHIC) WITH ULCER OF RIGHT LOWER EXTREMITY (CODE) (HCC): ICD-10-CM

## 2024-05-14 DIAGNOSIS — E11.649 TYPE 2 DIABETES MELLITUS WITH HYPOGLYCEMIA WITHOUT COMA, WITH LONG-TERM CURRENT USE OF INSULIN (HCC): ICD-10-CM

## 2024-05-14 DIAGNOSIS — L97.912 NON-PRESSURE ULCER OF RIGHT LOWER EXTREMITY WITH FAT LAYER EXPOSED (HCC): ICD-10-CM

## 2024-05-14 DIAGNOSIS — Z79.4 TYPE 2 DIABETES MELLITUS WITH HYPOGLYCEMIA WITHOUT COMA, WITH LONG-TERM CURRENT USE OF INSULIN (HCC): ICD-10-CM

## 2024-05-14 DIAGNOSIS — L97.522 DIABETIC ULCER OF OTHER PART OF LEFT FOOT ASSOCIATED WITH TYPE 2 DIABETES MELLITUS, WITH FAT LAYER EXPOSED (HCC): Primary | ICD-10-CM

## 2024-05-14 PROCEDURE — 97597 DBRDMT OPN WND 1ST 20 CM/<: CPT | Performed by: STUDENT IN AN ORGANIZED HEALTH CARE EDUCATION/TRAINING PROGRAM

## 2024-05-14 PROCEDURE — 29445 APPL RIGID TOT CNTC LEG CAST: CPT | Performed by: STUDENT IN AN ORGANIZED HEALTH CARE EDUCATION/TRAINING PROGRAM

## 2024-05-14 NOTE — PROGRESS NOTES
"Patient ID: Isael Avendano is a 68 y.o. male Date of Birth 1955     Chief Complaint  Chief Complaint   Patient presents with    Follow Up Wound Care Visit     RLE and LLE       Allergies  Sulfa antibiotics and Daptomycin    Assessment:     Diagnoses and all orders for this visit:    Diabetic ulcer of other part of left foot associated with type 2 diabetes mellitus, with fat layer exposed (HCC)  -     Wound Procedure Treatment Venous Ulcer Right Pretibial  -     Wound Procedure Treatment Diabetic Ulcer Left Plantar  -     Wound cleansing and dressings Diabetic Ulcer Left Plantar; Future  -     Cast Application    Non-pressure ulcer of right lower extremity with fat layer exposed (HCC)  -     Wound Procedure Treatment Venous Ulcer Right Pretibial  -     Wound Procedure Treatment Diabetic Ulcer Left Plantar  -     Wound cleansing and dressings Diabetic Ulcer Left Plantar; Future  -     Debridement    Type 2 diabetes mellitus with hypoglycemia without coma, with long-term current use of insulin (HCC)  -     Wound Procedure Treatment Venous Ulcer Right Pretibial  -     Wound Procedure Treatment Diabetic Ulcer Left Plantar  -     Wound cleansing and dressings Diabetic Ulcer Left Plantar; Future    Chronic venous hypertension (idiopathic) with ulcer of right lower extremity (CODE) ([facility])  -     Wound Procedure Treatment Venous Ulcer Right Pretibial  -     Wound Procedure Treatment Diabetic Ulcer Left Plantar  -     Wound cleansing and dressings Diabetic Ulcer Left Plantar; Future              Debridement   Wound 05/07/24 Venous Ulcer Pretibial Right    Universal Protocol:  Consent: Verbal consent obtained.  Risks and benefits: risks, benefits and alternatives were discussed  Consent given by: patient  Time out: Immediately prior to procedure a \"time out\" was called to verify the correct patient, procedure, equipment, support staff and site/side marked as required.  Patient identity confirmed: verbally with " "patient    Debridement Details  Performed by: physician  Debridement type: selective  Pain control: lidocaine 4%      Post-debridement measurements  Length (cm): 3  Width (cm): 0.6  Depth (cm): 0.1  Percent debrided: 90%  Surface Area (cm^2): 1.8  Area Debrided (cm^2): 1.62  Volume (cm^3): 0.18    Devitalized tissue debrided: biofilm and exudate  Instrument(s) utilized: curette  Bleeding: small  Hemostasis obtained with: pressure  Procedural pain (0-10): 1  Post-procedural pain: 0   Response to treatment: procedure was tolerated well    Cast Application    Date/Time: 5/14/2024 8:30 AM    Performed by: Shawnee Wilson MD  Authorized by: Shawnee Wilson MD  Universal Protocol:  Consent: Verbal consent obtained.  Risks and benefits: risks, benefits and alternatives were discussed  Consent given by: patient  Time out: Immediately prior to procedure a \"time out\" was called to verify the correct patient, procedure, equipment, support staff and site/side marked as required.  Patient identity confirmed: verbally with patient    Pre-procedure details:     Sensation:  Unchanged  Procedure details:     Laterality:  Left    Location:  Foot    Foot:  L foot    Strapping: no  Cast type:  Total contact        Supplies:  4 layer wrap    Total Contact Cast Application     TCC was placed over the patient's left foot and lower leg.  The purpose of the TCC is to remove pressure from the foot ulcer upon ambulation and heal the diabetic ulcers. Prior to placing the TCC, consent was obtained and the patient was placed in a position to access the lower leg and foot.  I recommended application of the TCC to facilitate healing particularly in light of the failure to heal previously without TCC.   After application of the primary dressing, a TCC EZ cast was applied with the patient in the supine sitting position and a rigid board utilized on the plantar foot to maintain the ankle at 90 degrees. The kit components were applied as follows: " "stockinette applied in a smooth non-wrinkled fashion, protective felt over the malleoli, tibial crest, toes and plantar foot to 2\" behind heel and secured in place with tape. Care was taken to have a finger's width excess beyond the longest toe to prevent toe impingement. Following this, the protective white sleeve was applied with an excess distally of 3\" and leaving 2\" of excess stockinette proximally. Finally, the cast sock immersed in warm water for 20 seconds and was applied distal to proximal with the foot at a 90 degree angle to the leg.  Care was taken to leave 3\" excess beyond the toes that was folded over and smoothed on the dorsal forefoot.  The cast was smoothed and held on the board in at 90 degrees to leg for 5 minutes. The patient was than sat upright and told to gently rest cast on floor with ankle at 90 degrees for an additional 15 minutes. After complete hardening, the walker boot was applied.        Strict instructions to keep the cast clean, dry and intact were given and also instructions not to introduce powders or foreign objects into the cast was given. Instructions were given to call if any cast breakdown, or any irritation, odor, drainage, swelling or pain was noted within the cast.       I explained that good wound care and compliance are necessary to allow healing and to prevent toe loss or limb loss.       No guarantees of wound healing were given and I explained clearly that there is some risk with the use of TCC such as cast \"rub\", cast ulcers, or hidden infection but that the benefits of the TCC far outweight the risks if patient compliance is in place.       Plan:   It was a pleasure to see Isael Avendano for wound care follow up today  Selective debridement performed today as above to RLE venous wound and TCC application to L DFU  Wound is improving   Continue plan of care as noted below with puracol, hydrofera ready, unna boot to RLE and bordered foam and TCC to left foot   A1C results " reviewed with the patient today.   No signs or symptoms of infection today. Patient understands that if any signs of infection start (such as increased redness, drainage, pain, fever, chills, diaphoresis), they should call our office or proceed to the ER or Urgent Care.  Patient should continue a high protein diet to facilitate wound healing  Patient is advised to not submerge wound or leave wound open to air.  Follow up in 1 weeks  Given the multi-factorial nature of wound care, additional time was taken to review patient's treatment plan with other specialties and most recent pertinent lab work and imaging.   All plans of care discussed with patient at bedside who verbalized understanding with treatment plan.    Wound 05/07/24 Venous Ulcer Pretibial Right (Active)   Wound Image Images linked 05/14/24 0845   Wound Description Pink;Epithelialization 05/14/24 0844   Jenn-wound Assessment Hyperpigmented;Dry;Pink 05/14/24 0844   Wound Length (cm) 3 cm 05/14/24 0844   Wound Width (cm) 0.6 cm 05/14/24 0844   Wound Depth (cm) 0.1 cm 05/14/24 0844   Wound Surface Area (cm^2) 1.8 cm^2 05/14/24 0844   Wound Volume (cm^3) 0.18 cm^3 05/14/24 0844   Calculated Wound Volume (cm^3) 0.18 cm^3 05/14/24 0844   Change in Wound Size % 99.09 05/14/24 0844   Drainage Amount Scant 05/14/24 0844   Drainage Description Serous 05/14/24 0844   Non-staged Wound Description Full thickness 05/14/24 0844   Dressing Status Intact (upon arrival) 05/14/24 0844       Wound 05/07/24 Diabetic Ulcer Plantar Left (Active)   Enter Lin score: Lin Grade 1: Partial or full-thickness ulcer (superficial) 05/14/24 0847   Wound Image Images linked 05/14/24 0848   Wound Description Pink;Dry 05/14/24 0847   Jenn-wound Assessment Callus 05/14/24 0847   Wound Length (cm) 0.1 cm 05/14/24 0847   Wound Width (cm) 0.1 cm 05/14/24 0847   Wound Depth (cm) 0.1 cm 05/14/24 0847   Wound Surface Area (cm^2) 0.01 cm^2 05/14/24 0847   Wound Volume (cm^3) 0.001 cm^3  05/14/24 0847   Calculated Wound Volume (cm^3) 0 cm^3 05/14/24 0847   Change in Wound Size % 100 05/14/24 0847   Drainage Amount None 05/14/24 0847   Non-staged Wound Description Full thickness 05/14/24 0847   Dressing Status Intact (upon arrival) 05/14/24 0847       Wound 05/07/24 Venous Ulcer Pretibial Right (Active)   Date First Assessed/Time First Assessed: 05/07/24 0932   Primary Wound Type: Venous Ulcer  Location: Pretibial  Wound Location Orientation: Right       Wound 05/07/24 Diabetic Ulcer Plantar Left (Active)   Date First Assessed: 05/07/24   Primary Wound Type: Diabetic Ulcer  Location: Plantar  Wound Location Orientation: Left       [REMOVED] Wound 05/02/23 Shoulder Left (Removed)   Resolved Date: 06/28/23  Date First Assessed/Time First Assessed: 05/02/23 1413   Location: Shoulder  Wound Location Orientation: Left  Wound Description (Comments): wound vac, 1 black sponge, and sling       [REMOVED] Wound 06/13/23 Shoulder Left (Removed)   Resolved Date: 05/14/24  Date First Assessed/Time First Assessed: 06/13/23 1829   Location: Shoulder  Wound Location Orientation: Left  Wound Description (Comments): incision closed with sutures  Incision's 1st Dressing: DRESSING XEROFORM 5 X 9 (x1), ...       [REMOVED] Wound 06/24/23 Other (comment) Toe (Comment  which one) Left (Removed)   Resolved Date: 05/07/24  Date First Assessed/Time First Assessed: 06/24/23 0813   Present on Original Admission: (c) No  Primary Wound Type: (c) Other (comment)  Location: (c) Toe (Comment  which one)  Wound Location Orientation: Left  Wound Outcome: ...       [REMOVED] Wound 09/08/23 Shoulder Left (Removed)   Resolved Date: 05/14/24  Date First Assessed/Time First Assessed: 09/08/23 1014   Location: Shoulder  Wound Location Orientation: Left  Wound Description (Comments): exofin, steri strips, mepilex, tegaderm, 4x4  immobilizer   Wound Outcome: (c) Other ...       [REMOVED] Wound 02/19/24 Toe D5, fifth Anterior;Left (Removed)    Resolved Date: 05/07/24  Date First Assessed/Time First Assessed: 02/19/24 0915   Location: Toe D5, fifth  Wound Location Orientation: Anterior;Left  Wound Outcome: Unknown (No longer present)       [REMOVED] Wound 02/19/24 Toe D4, fourth Anterior;Left (Removed)   Resolved Date: 05/07/24  Date First Assessed/Time First Assessed: 02/19/24 0915   Location: Toe D4, fourth  Wound Location Orientation: Anterior;Left  Wound Outcome: Unknown (No longer present)       [REMOVED] Wound 05/07/24 Venous Ulcer Pretibial Distal;Left (Removed)   Resolved Date/Resolved Time: 05/07/24 0942  Date First Assessed: 05/07/24   Primary Wound Type: Venous Ulcer  Location: Pretibial  Wound Location Orientation: Distal;Left  Wound Outcome: Unknown (No longer present)       Subjective:      .    5/14/24: Tolerated TCC well.  Changing dressing to right lower extremity wound as directed.  Happy with wound healing.  No symptoms of infection.    5/9/24: Patient presents today for 48-hour initial TCC check.  Tolerated well.  Symptoms of infection.     5/7/24: Consult - Isael is a pleasant 68-year-old male with a past medical history of hypertension, diabetes mellitus most recent hemoglobin A1c 5.7% 2 months ago, diabetic polyneuropathy, stage IIIa CKD, obesity here today for wound care consult.  Patient has been following with Dr. Obdulio Lucas from podiatry for second opinion regarding multiple wounds of his left foot.  Patient was referred by podiatry to wound management for third opinion as well as continued wound care.  Patient notes that he had wounds to the left foot on multiple occasions since 2021.  At one point was being followed by wound management in [place] when he was there and had skin substitute applied which temporarily healed the wound.  Most recently was hospitalized in April 2024 at which time osteomyelitis was confirmed via MRI of the fourth digit left foot.  Patient declined amputation and podiatry discontinued IV antibiotics  due to lack of local infection noted.  This wound did heal however in the past month patient noted a new wound of the plantar forefoot of the left foot.  This is most recently seen by original podiatrist Dr Mathur and then consult with Dr. Obdulio Lucas for further recommendations.  At most recent appointment Dr. Lucas patient with advised to see wound management for more continued care.   Patient also notes a new wound of the posterior right lower extremity that I believe started only this past week when he went into the shower and came out noticed an open wound.  Patient was seen in ER, advised to apply Dermagran which patient and his wife purchased, given a course of Keflex.  Patient without any symptoms of infection today including fever chills diaphoresis.  Believes that wound is much improved.          The following portions of the patient's history were reviewed and updated as appropriate: allergies, current medications, past family history, past medical history, past social history, past surgical history, and problem list.    Review of Systems   Constitutional:  Negative for chills, diaphoresis and fever.   Skin:  Positive for wound.   All other systems reviewed and are negative.        Objective:       Wound 05/07/24 Venous Ulcer Pretibial Right (Active)   Wound Image Images linked 05/14/24 0845   Wound Description Pink;Epithelialization 05/14/24 0844   Jenn-wound Assessment Hyperpigmented;Dry;Pink 05/14/24 0844   Wound Length (cm) 3 cm 05/14/24 0844   Wound Width (cm) 0.6 cm 05/14/24 0844   Wound Depth (cm) 0.1 cm 05/14/24 0844   Wound Surface Area (cm^2) 1.8 cm^2 05/14/24 0844   Wound Volume (cm^3) 0.18 cm^3 05/14/24 0844   Calculated Wound Volume (cm^3) 0.18 cm^3 05/14/24 0844   Change in Wound Size % 99.09 05/14/24 0844   Drainage Amount Scant 05/14/24 0844   Drainage Description Serous 05/14/24 0844   Non-staged Wound Description Full thickness 05/14/24 0844   Dressing Status Intact (upon arrival) 05/14/24  0844       Wound 05/07/24 Diabetic Ulcer Plantar Left (Active)   Enter Lin score: Lin Grade 1: Partial or full-thickness ulcer (superficial) 05/14/24 0847   Wound Image Images linked 05/14/24 0848   Wound Description Pink;Dry 05/14/24 0847   Jenn-wound Assessment Callus 05/14/24 0847   Wound Length (cm) 0.1 cm 05/14/24 0847   Wound Width (cm) 0.1 cm 05/14/24 0847   Wound Depth (cm) 0.1 cm 05/14/24 0847   Wound Surface Area (cm^2) 0.01 cm^2 05/14/24 0847   Wound Volume (cm^3) 0.001 cm^3 05/14/24 0847   Calculated Wound Volume (cm^3) 0 cm^3 05/14/24 0847   Change in Wound Size % 100 05/14/24 0847   Drainage Amount None 05/14/24 0847   Non-staged Wound Description Full thickness 05/14/24 0847   Dressing Status Intact (upon arrival) 05/14/24 0847       /72   Pulse 91   Temp (!) 97.2 °F (36.2 °C)   Resp 15     Physical Exam  Vitals reviewed.   Constitutional:       Appearance: Normal appearance.   HENT:      Head: Normocephalic and atraumatic.   Eyes:      Extraocular Movements: Extraocular movements intact.   Pulmonary:      Effort: Pulmonary effort is normal.   Musculoskeletal:      Cervical back: Neck supple.      Right lower leg: Edema present.   Skin:     Comments: Pigmentation of bilateral lower extremities consistent with chronic venous stasis disease.    Posterior lower extremity wound much smaller today than prior exam.  Still with drainage and open wound.  Grossly improved.    Plantar Lin 2 diabetic foot ulcer of left forefoot almost fully epithelialized today.  Some callus deposition.  No signs of infection.   Neurological:      Mental Status: He is alert.   Psychiatric:         Mood and Affect: Mood normal.                     Wound Instructions:  Orders Placed This Encounter   Procedures    Wound Procedure Treatment Venous Ulcer Right Pretibial     This order was created via procedure documentation    Wound Procedure Treatment Diabetic Ulcer Left Plantar     This order was created via  procedure documentation    Wound cleansing and dressings Diabetic Ulcer Left Plantar     Off-loading Instructions: Cast to left lower leg     Total Contact Cast Instructions: Do not get cast wet. Contact wound center if there is a foul odor or becomes uncomfortable due to feeling tight or swelling. Do not use objects down inside of cast to scratch. Do not walk on cast without walking boot.     Limit walking to only necessity.        Multi-layer compression wrap Instructions to right lower leg    Keep compression wrap/wraps clean and dry. If wraps are too tight and you experience numbness/tingling, call the wound center. If after hours, remove wraps or proceed to nearest E.R. and call wound center in AM.    Wrap will be changed 1 x weekly at wound center.    Avoid prolonged standing in one place.    Elevate leg(s) above the level of the heart when sitting or as much as possible.     Standing Status:   Future     Standing Expiration Date:   5/21/2024    Debridement     This order was created via procedure documentation    Cast Application     This order was created via procedure documentation        Diagnosis ICD-10-CM Associated Orders   1. Diabetic ulcer of other part of left foot associated with type 2 diabetes mellitus, with fat layer exposed ([facility])  E11.621 Wound Procedure Treatment Venous Ulcer Right Pretibial    L97.522 Wound Procedure Treatment Diabetic Ulcer Left Plantar     Wound cleansing and dressings Diabetic Ulcer Left Plantar     Cast Application      2. Non-pressure ulcer of right lower extremity with fat layer exposed ([facility])  L97.912 Wound Procedure Treatment Venous Ulcer Right Pretibial     Wound Procedure Treatment Diabetic Ulcer Left Plantar     Wound cleansing and dressings Diabetic Ulcer Left Plantar     Debridement      3. Type 2 diabetes mellitus with hypoglycemia without coma, with long-term current use of insulin ([facility])  E11.649 Wound Procedure Treatment Venous Ulcer Right Pretibial    Z79.4  "Wound Procedure Treatment Diabetic Ulcer Left Plantar     Wound cleansing and dressings Diabetic Ulcer Left Plantar      4. Chronic venous hypertension (idiopathic) with ulcer of right lower extremity (CODE) ([facility])  I87.311 Wound Procedure Treatment Venous Ulcer Right Pretibial     Wound Procedure Treatment Diabetic Ulcer Left Plantar     Wound cleansing and dressings Diabetic Ulcer Left Plantar          --  Shawnee Wilson MD    \"This note has been constructed using a voice recognition system. Therefore there may be syntax, spelling, and/or grammatical errors. Occasional wrong word or \"sound alike\" substitutions may have occurred due to the inherent limitations of voice recognition software. Read the chart carefully and recognize, using context, where substitutions have occurred. Please call if you have any questions.\"     "

## 2024-05-14 NOTE — PATIENT INSTRUCTIONS
Orders Placed This Encounter   Procedures    Wound Procedure Treatment Venous Ulcer Right Pretibial     This order was created via procedure documentation    Wound Procedure Treatment Diabetic Ulcer Left Plantar     This order was created via procedure documentation    Wound cleansing and dressings Diabetic Ulcer Left Plantar     Off-loading Instructions: Cast to left lower leg     Total Contact Cast Instructions: Do not get cast wet. Contact wound center if there is a foul odor or becomes uncomfortable due to feeling tight or swelling. Do not use objects down inside of cast to scratch. Do not walk on cast without walking boot.     Limit walking to only necessity.        Multi-layer compression wrap Instructions to right lower leg    Keep compression wrap/wraps clean and dry. If wraps are too tight and you experience numbness/tingling, call the wound center. If after hours, remove wraps or proceed to nearest E.R. and call wound center in AM.    Wrap will be changed 1 x weekly at wound center.    Avoid prolonged standing in one place.    Elevate leg(s) above the level of the heart when sitting or as much as possible.     Standing Status:   Future     Standing Expiration Date:   5/21/2024

## 2024-05-16 ENCOUNTER — TELEPHONE (OUTPATIENT)
Age: 69
End: 2024-05-16

## 2024-05-16 NOTE — TELEPHONE ENCOUNTER
Caller: Isael    Doctor and/or Office: [facility]     #: [phone]    Escalation: Appointment Patient called stating Dr Lucas wanted to see him after his cast is off to discuss sx since he keeps getting a diab ulcer on his hammertoe.  I did sched him for 6/27, but he feels he needs to be seen sooner or he will keeping getting the ulcer back.  Please advise thank you.

## 2024-05-17 ENCOUNTER — APPOINTMENT (OUTPATIENT)
Dept: LAB | Facility: CLINIC | Age: 69
End: 2024-05-17
Payer: MEDICARE

## 2024-05-17 DIAGNOSIS — I10 ESSENTIAL (PRIMARY) HYPERTENSION: ICD-10-CM

## 2024-05-17 DIAGNOSIS — E11.9 TYPE 2 DIABETES MELLITUS WITHOUT COMPLICATIONS (HCC): ICD-10-CM

## 2024-05-17 DIAGNOSIS — D61.818 PANCYTOPENIA (HCC): ICD-10-CM

## 2024-05-17 DIAGNOSIS — N40.0 BENIGN PROSTATIC HYPERPLASIA WITHOUT LOWER URINARY TRACT SYMPTOMS: ICD-10-CM

## 2024-05-17 DIAGNOSIS — E61.1 IRON DEFICIENCY: ICD-10-CM

## 2024-05-17 DIAGNOSIS — Z11.59 ENCOUNTER FOR SCREENING FOR OTHER VIRAL DISEASES: ICD-10-CM

## 2024-05-17 DIAGNOSIS — E78.2 MIXED HYPERLIPIDEMIA: ICD-10-CM

## 2024-05-17 DIAGNOSIS — K74.60 CIRRHOSIS OF LIVER WITH ASCITES, UNSPECIFIED HEPATIC CIRRHOSIS TYPE  (HCC): ICD-10-CM

## 2024-05-17 DIAGNOSIS — R18.8 CIRRHOSIS OF LIVER WITH ASCITES, UNSPECIFIED HEPATIC CIRRHOSIS TYPE  (HCC): ICD-10-CM

## 2024-05-17 LAB
ALBUMIN SERPL BCP-MCNC: 3.2 G/DL (ref 3.5–5)
ALP SERPL-CCNC: 183 U/L (ref 34–104)
ALT SERPL W P-5'-P-CCNC: 41 U/L (ref 7–52)
ANA SER QL IA: NEGATIVE
ANION GAP SERPL CALCULATED.3IONS-SCNC: 7 MMOL/L (ref 4–13)
AST SERPL W P-5'-P-CCNC: 58 U/L (ref 13–39)
BACTERIA UR QL AUTO: ABNORMAL /HPF
BASOPHILS # BLD AUTO: 0.02 THOUSANDS/ÂΜL (ref 0–0.1)
BASOPHILS NFR BLD AUTO: 1 % (ref 0–1)
BILIRUB SERPL-MCNC: 1.54 MG/DL (ref 0.2–1)
BILIRUB UR QL STRIP: NEGATIVE
BUN SERPL-MCNC: 19 MG/DL (ref 5–25)
CALCIUM ALBUM COR SERPL-MCNC: 10.1 MG/DL (ref 8.3–10.1)
CALCIUM SERPL-MCNC: 9.5 MG/DL (ref 8.4–10.2)
CHLORIDE SERPL-SCNC: 102 MMOL/L (ref 96–108)
CHOLEST SERPL-MCNC: 144 MG/DL
CLARITY UR: CLEAR
CO2 SERPL-SCNC: 26 MMOL/L (ref 21–32)
COLOR UR: YELLOW
CREAT SERPL-MCNC: 1.01 MG/DL (ref 0.6–1.3)
CREAT UR-MCNC: 107.8 MG/DL
EOSINOPHIL # BLD AUTO: 0.38 THOUSAND/ÂΜL (ref 0–0.61)
EOSINOPHIL NFR BLD AUTO: 13 % (ref 0–6)
ERYTHROCYTE [DISTWIDTH] IN BLOOD BY AUTOMATED COUNT: 14.9 % (ref 11.6–15.1)
ERYTHROCYTE [SEDIMENTATION RATE] IN BLOOD: 105 MM/HOUR (ref 0–19)
EST. AVERAGE GLUCOSE BLD GHB EST-MCNC: 237 MG/DL
GFR SERPL CREATININE-BSD FRML MDRD: 75 ML/MIN/1.73SQ M
GLUCOSE P FAST SERPL-MCNC: 155 MG/DL (ref 65–99)
GLUCOSE UR STRIP-MCNC: ABNORMAL MG/DL
HAV AB SER QL IA: NORMAL
HBA1C MFR BLD: 9.9 %
HBV SURFACE AB SER-ACNC: <3 MIU/ML
HCT VFR BLD AUTO: 35.4 % (ref 36.5–49.3)
HDLC SERPL-MCNC: 34 MG/DL
HGB BLD-MCNC: 11.1 G/DL (ref 12–17)
HGB UR QL STRIP.AUTO: NEGATIVE
HYALINE CASTS #/AREA URNS LPF: ABNORMAL /LPF
IGA SERPL-MCNC: 1292 MG/DL (ref 66–433)
IGG SERPL-MCNC: 2020 MG/DL (ref 635–1741)
IGM SERPL-MCNC: 137 MG/DL (ref 45–281)
IMM GRANULOCYTES # BLD AUTO: 0.01 THOUSAND/UL (ref 0–0.2)
IMM GRANULOCYTES NFR BLD AUTO: 0 % (ref 0–2)
INR PPP: 1.19 (ref 0.84–1.19)
KETONES UR STRIP-MCNC: NEGATIVE MG/DL
LDLC SERPL CALC-MCNC: 92 MG/DL (ref 0–100)
LEUKOCYTE ESTERASE UR QL STRIP: NEGATIVE
LYMPHOCYTES # BLD AUTO: 0.36 THOUSANDS/ÂΜL (ref 0.6–4.47)
LYMPHOCYTES NFR BLD AUTO: 12 % (ref 14–44)
MCH RBC QN AUTO: 26.6 PG (ref 26.8–34.3)
MCHC RBC AUTO-ENTMCNC: 31.4 G/DL (ref 31.4–37.4)
MCV RBC AUTO: 85 FL (ref 82–98)
MICROALBUMIN UR-MCNC: 25.5 MG/L
MICROALBUMIN/CREAT 24H UR: 24 MG/G CREATININE (ref 0–30)
MONOCYTES # BLD AUTO: 0.24 THOUSAND/ÂΜL (ref 0.17–1.22)
MONOCYTES NFR BLD AUTO: 8 % (ref 4–12)
NEUTROPHILS # BLD AUTO: 1.91 THOUSANDS/ÂΜL (ref 1.85–7.62)
NEUTS SEG NFR BLD AUTO: 66 % (ref 43–75)
NITRITE UR QL STRIP: NEGATIVE
NON-SQ EPI CELLS URNS QL MICRO: ABNORMAL /HPF
NONHDLC SERPL-MCNC: 110 MG/DL
NRBC BLD AUTO-RTO: 0 /100 WBCS
PH UR STRIP.AUTO: 6.5 [PH]
PLATELET # BLD AUTO: 79 THOUSANDS/UL (ref 149–390)
POTASSIUM SERPL-SCNC: 4.2 MMOL/L (ref 3.5–5.3)
PROT SERPL-MCNC: 8.1 G/DL (ref 6.4–8.4)
PROT UR STRIP-MCNC: ABNORMAL MG/DL
PROTHROMBIN TIME: 15 SECONDS (ref 11.6–14.5)
RBC # BLD AUTO: 4.17 MILLION/UL (ref 3.88–5.62)
RBC #/AREA URNS AUTO: ABNORMAL /HPF
SODIUM SERPL-SCNC: 135 MMOL/L (ref 135–147)
SP GR UR STRIP.AUTO: 1.02 (ref 1–1.03)
TRIGL SERPL-MCNC: 92 MG/DL
TSH SERPL DL<=0.05 MIU/L-ACNC: 2.52 UIU/ML (ref 0.45–4.5)
UROBILINOGEN UR STRIP-ACNC: 2 MG/DL
WBC # BLD AUTO: 2.92 THOUSAND/UL (ref 4.31–10.16)
WBC #/AREA URNS AUTO: ABNORMAL /HPF

## 2024-05-17 PROCEDURE — 86038 ANTINUCLEAR ANTIBODIES: CPT

## 2024-05-17 PROCEDURE — 86706 HEP B SURFACE ANTIBODY: CPT

## 2024-05-17 PROCEDURE — 81001 URINALYSIS AUTO W/SCOPE: CPT

## 2024-05-17 PROCEDURE — 84443 ASSAY THYROID STIM HORMONE: CPT

## 2024-05-17 PROCEDURE — 82570 ASSAY OF URINE CREATININE: CPT

## 2024-05-17 PROCEDURE — 36415 COLL VENOUS BLD VENIPUNCTURE: CPT

## 2024-05-17 PROCEDURE — 86015 ACTIN ANTIBODY EACH: CPT

## 2024-05-17 PROCEDURE — 82784 ASSAY IGA/IGD/IGG/IGM EACH: CPT

## 2024-05-17 PROCEDURE — 82043 UR ALBUMIN QUANTITATIVE: CPT

## 2024-05-17 PROCEDURE — 85610 PROTHROMBIN TIME: CPT

## 2024-05-17 PROCEDURE — 86381 MITOCHONDRIAL ANTIBODY EACH: CPT

## 2024-05-17 PROCEDURE — 86708 HEPATITIS A ANTIBODY: CPT

## 2024-05-17 PROCEDURE — 82104 ALPHA-1-ANTITRYPSIN PHENO: CPT

## 2024-05-17 PROCEDURE — 85025 COMPLETE CBC W/AUTO DIFF WBC: CPT

## 2024-05-17 PROCEDURE — 80061 LIPID PANEL: CPT

## 2024-05-17 PROCEDURE — 82103 ALPHA-1-ANTITRYPSIN TOTAL: CPT

## 2024-05-17 PROCEDURE — 85652 RBC SED RATE AUTOMATED: CPT

## 2024-05-17 PROCEDURE — 80053 COMPREHEN METABOLIC PANEL: CPT

## 2024-05-18 LAB
ACTIN IGG SERPL-ACNC: 11 UNITS (ref 0–19)
MITOCHONDRIA M2 IGG SER-ACNC: <20 UNITS (ref 0–20)

## 2024-05-20 ENCOUNTER — HOSPITAL ENCOUNTER (OUTPATIENT)
Dept: MRI IMAGING | Facility: HOSPITAL | Age: 69
Discharge: HOME | End: 2024-05-20
Payer: MEDICARE

## 2024-05-20 DIAGNOSIS — K74.60 CIRRHOSIS OF LIVER WITH ASCITES, UNSPECIFIED HEPATIC CIRRHOSIS TYPE  (HCC): ICD-10-CM

## 2024-05-20 DIAGNOSIS — K76.9 LIVER LESION: ICD-10-CM

## 2024-05-20 DIAGNOSIS — R18.8 CIRRHOSIS OF LIVER WITH ASCITES, UNSPECIFIED HEPATIC CIRRHOSIS TYPE  (HCC): ICD-10-CM

## 2024-05-20 PROCEDURE — A9585 GADOBUTROL INJECTION: HCPCS | Performed by: FAMILY MEDICINE

## 2024-05-20 PROCEDURE — 74183 MRI ABD W/O CNTR FLWD CNTR: CPT

## 2024-05-20 RX ORDER — GADOBUTROL 604.72 MG/ML
10 INJECTION INTRAVENOUS
Status: COMPLETED | OUTPATIENT
Start: 2024-05-20 | End: 2024-05-20

## 2024-05-20 RX ADMIN — GADOBUTROL 10 ML: 604.72 INJECTION INTRAVENOUS at 09:53

## 2024-05-21 ENCOUNTER — OFFICE VISIT (OUTPATIENT)
Dept: WOUND CARE | Facility: HOSPITAL | Age: 69
End: 2024-05-21
Payer: MEDICARE

## 2024-05-21 VITALS
TEMPERATURE: 96.9 F | HEART RATE: 86 BPM | RESPIRATION RATE: 18 BRPM | SYSTOLIC BLOOD PRESSURE: 126 MMHG | DIASTOLIC BLOOD PRESSURE: 76 MMHG

## 2024-05-21 DIAGNOSIS — L97.912 NON-PRESSURE ULCER OF RIGHT LOWER EXTREMITY WITH FAT LAYER EXPOSED (HCC): ICD-10-CM

## 2024-05-21 DIAGNOSIS — E11.649 TYPE 2 DIABETES MELLITUS WITH HYPOGLYCEMIA WITHOUT COMA, WITH LONG-TERM CURRENT USE OF INSULIN (HCC): ICD-10-CM

## 2024-05-21 DIAGNOSIS — E11.621 DIABETIC ULCER OF OTHER PART OF LEFT FOOT ASSOCIATED WITH TYPE 2 DIABETES MELLITUS, WITH FAT LAYER EXPOSED (HCC): Primary | ICD-10-CM

## 2024-05-21 DIAGNOSIS — L97.522 DIABETIC ULCER OF OTHER PART OF LEFT FOOT ASSOCIATED WITH TYPE 2 DIABETES MELLITUS, WITH FAT LAYER EXPOSED (HCC): Primary | ICD-10-CM

## 2024-05-21 DIAGNOSIS — Z79.4 TYPE 2 DIABETES MELLITUS WITH HYPOGLYCEMIA WITHOUT COMA, WITH LONG-TERM CURRENT USE OF INSULIN (HCC): ICD-10-CM

## 2024-05-21 DIAGNOSIS — I87.311 CHRONIC VENOUS HYPERTENSION (IDIOPATHIC) WITH ULCER OF RIGHT LOWER EXTREMITY (CODE) (HCC): ICD-10-CM

## 2024-05-21 PROCEDURE — 99212 OFFICE O/P EST SF 10 MIN: CPT | Performed by: STUDENT IN AN ORGANIZED HEALTH CARE EDUCATION/TRAINING PROGRAM

## 2024-05-21 NOTE — PROGRESS NOTES
Patient ID: Isael Avendano is a 68 y.o. male Date of Birth 1955     Chief Complaint  Chief Complaint   Patient presents with    Follow Up Wound Care Visit     Left foot and right leg wound       Allergies  Sulfa antibiotics and Daptomycin    Assessment:     Diagnoses and all orders for this visit:    Diabetic ulcer of other part of left foot associated with type 2 diabetes mellitus, with fat layer exposed (HCC)  -     Wound cleansing and dressings; Future    Non-pressure ulcer of right lower extremity with fat layer exposed (HCC)  -     Wound cleansing and dressings; Future    Type 2 diabetes mellitus with hypoglycemia without coma, with long-term current use of insulin (HCC)  -     Wound cleansing and dressings; Future    Chronic venous hypertension (idiopathic) with ulcer of right lower extremity (CODE) (HCC)  -     Wound cleansing and dressings; Future          Plan:   It was a pleasure to see sIael Avendano for wound care follow up today  Wounds are fully epithelialized today.  Patient advised to protect newly healed skin with edema control using leg elevation and compression stockings as well as moisturization.  He should check feet daily and wear diabetic shoes. Follow up with podiatry for routine care.  All plans of care discussed with patient at bedside who verbalized understanding with treatment plan.    [REMOVED] Wound 05/07/24 Venous Ulcer Pretibial Right (Removed)   Wound Image Images linked 05/21/24 0838   Wound Description Epithelialization 05/21/24 0838   Jenn-wound Assessment Hyperpigmented;Dry;Pink 05/21/24 0838   Wound Length (cm) 0 cm 05/21/24 0838   Wound Width (cm) 0 cm 05/21/24 0838   Wound Depth (cm) 0 cm 05/21/24 0838   Wound Surface Area (cm^2) 0 cm^2 05/21/24 0838   Wound Volume (cm^3) 0 cm^3 05/21/24 0838   Calculated Wound Volume (cm^3) 0 cm^3 05/21/24 0838   Change in Wound Size % 100 05/21/24 0838   Drainage Amount None 05/21/24 0838   Non-staged Wound Description Not applicable  05/21/24 0838   Dressing Status Intact 05/21/24 0838       [REMOVED] Wound 05/07/24 Diabetic Ulcer Plantar Left (Removed)   Enter Lin score: Lin Grade 1: Partial or full-thickness ulcer (superficial) 05/21/24 0839   Wound Image Images linked 05/21/24 0839   Wound Description Epithelialization 05/21/24 0839   Jenn-wound Assessment Callus 05/21/24 0839   Wound Length (cm) 0 cm 05/21/24 0839   Wound Width (cm) 0 cm 05/21/24 0839   Wound Depth (cm) 0 cm 05/21/24 0839   Wound Surface Area (cm^2) 0 cm^2 05/21/24 0839   Wound Volume (cm^3) 0 cm^3 05/21/24 0839   Calculated Wound Volume (cm^3) 0 cm^3 05/21/24 0839   Change in Wound Size % 100 05/21/24 0839   Drainage Amount None 05/21/24 0839   Non-staged Wound Description Not applicable 05/21/24 0839   Dressing Status Intact 05/21/24 0839       [REMOVED] Wound 05/02/23 Shoulder Left (Removed)   Resolved Date: 06/28/23  Date First Assessed/Time First Assessed: 05/02/23 1413   Location: Shoulder  Wound Location Orientation: Left  Wound Description (Comments): wound vac, 1 black sponge, and sling       [REMOVED] Wound 06/13/23 Shoulder Left (Removed)   Resolved Date: 05/14/24  Date First Assessed/Time First Assessed: 06/13/23 1829   Location: Shoulder  Wound Location Orientation: Left  Wound Description (Comments): incision closed with sutures  Incision's 1st Dressing: DRESSING XEROFORM 5 X 9 (x1), ...       [REMOVED] Wound 06/24/23 Other (comment) Toe (Comment  which one) Left (Removed)   Resolved Date: 05/07/24  Date First Assessed/Time First Assessed: 06/24/23 0813   Present on Original Admission: (c) No  Primary Wound Type: (c) Other (comment)  Location: (c) Toe (Comment  which one)  Wound Location Orientation: Left  Wound Outcome: ...       [REMOVED] Wound 09/08/23 Shoulder Left (Removed)   Resolved Date: 05/14/24  Date First Assessed/Time First Assessed: 09/08/23 1014   Location: Shoulder  Wound Location Orientation: Left  Wound Description (Comments): exofin,  steri strips, mepilex, tegaderm, 4x4  immobilizer   Wound Outcome: (c) Other ...       [REMOVED] Wound 02/19/24 Toe D5, fifth Anterior;Left (Removed)   Resolved Date: 05/07/24  Date First Assessed/Time First Assessed: 02/19/24 0915   Location: Toe D5, fifth  Wound Location Orientation: Anterior;Left  Wound Outcome: Unknown (No longer present)       [REMOVED] Wound 02/19/24 Toe D4, fourth Anterior;Left (Removed)   Resolved Date: 05/07/24  Date First Assessed/Time First Assessed: 02/19/24 0915   Location: Toe D4, fourth  Wound Location Orientation: Anterior;Left  Wound Outcome: Unknown (No longer present)       [REMOVED] Wound 05/07/24 Venous Ulcer Pretibial Right (Removed)   Resolved Date/Resolved Time: 05/21/24 0846  Date First Assessed/Time First Assessed: 05/07/24 0932   Primary Wound Type: Venous Ulcer  Location: Pretibial  Wound Location Orientation: Right  Wound Outcome: Healed       [REMOVED] Wound 05/07/24 Venous Ulcer Pretibial Distal;Left (Removed)   Resolved Date/Resolved Time: 05/07/24 0942  Date First Assessed: 05/07/24   Primary Wound Type: Venous Ulcer  Location: Pretibial  Wound Location Orientation: Distal;Left  Wound Outcome: Unknown (No longer present)       [REMOVED] Wound 05/07/24 Diabetic Ulcer Plantar Left (Removed)   Resolved Date/Resolved Time: 05/21/24 0846  Date First Assessed: 05/07/24   Primary Wound Type: Diabetic Ulcer  Location: Plantar  Wound Location Orientation: Left  Wound Outcome: Healed       Subjective:      .    5/21/24, 5/14/24: Tolerated TCC well.  Changing dressing to right lower extremity wound as directed.  Happy with wound healing.  No symptoms of infection.     5/9/24: Patient presents today for 48-hour initial TCC check.  Tolerated well.  Symptoms of infection.     5/7/24: Gelacio Whitt Isael is a pleasant 68-year-old male with a past medical history of hypertension, diabetes mellitus most recent hemoglobin A1c 5.7% 2 months ago, diabetic polyneuropathy, stage IIIa CKD,  obesity here today for wound care consult.  Patient has been following with Dr. Obdulio Lucas from podiatry for second opinion regarding multiple wounds of his left foot.  Patient was referred by podiatry to wound management for third opinion as well as continued wound care.  Patient notes that he had wounds to the left foot on multiple occasions since 2021.  At one point was being followed by wound management in [place] when he was there and had skin substitute applied which temporarily healed the wound.  Most recently was hospitalized in April 2024 at which time osteomyelitis was confirmed via MRI of the fourth digit left foot.  Patient declined amputation and podiatry discontinued IV antibiotics due to lack of local infection noted.  This wound did heal however in the past month patient noted a new wound of the plantar forefoot of the left foot.  This is most recently seen by original podiatrist Dr Mathur and then consult with Dr. Obdulio Lucas for further recommendations.  At most recent appointment Dr. Lucas patient with advised to see wound management for more continued care.   Patient also notes a new wound of the posterior right lower extremity that I believe started only this past week when he went into the shower and came out noticed an open wound.  Patient was seen in ER, advised to apply Dermagran which patient and his wife purchased, given a course of Keflex.  Patient without any symptoms of infection today including fever chills diaphoresis.  Believes that wound is much improved.          The following portions of the patient's history were reviewed and updated as appropriate: allergies, current medications, past family history, past medical history, past social history, past surgical history, and problem list.    Review of Systems   Constitutional:  Negative for chills, diaphoresis and fever.   Skin:  Positive for wound.   All other systems reviewed and are negative.        Objective:       [REMOVED] Wound  05/07/24 Venous Ulcer Pretibial Right (Removed)   Wound Image Images linked 05/21/24 0838   Wound Description Epithelialization 05/21/24 0838   Jenn-wound Assessment Hyperpigmented;Dry;Pink 05/21/24 0838   Wound Length (cm) 0 cm 05/21/24 0838   Wound Width (cm) 0 cm 05/21/24 0838   Wound Depth (cm) 0 cm 05/21/24 0838   Wound Surface Area (cm^2) 0 cm^2 05/21/24 0838   Wound Volume (cm^3) 0 cm^3 05/21/24 0838   Calculated Wound Volume (cm^3) 0 cm^3 05/21/24 0838   Change in Wound Size % 100 05/21/24 0838   Drainage Amount None 05/21/24 0838   Non-staged Wound Description Not applicable 05/21/24 0838   Dressing Status Intact 05/21/24 0838       [REMOVED] Wound 05/07/24 Diabetic Ulcer Plantar Left (Removed)   Enter Lin score: Lin Grade 1: Partial or full-thickness ulcer (superficial) 05/21/24 0839   Wound Image Images linked 05/21/24 0839   Wound Description Epithelialization 05/21/24 0839   Jenn-wound Assessment Callus 05/21/24 0839   Wound Length (cm) 0 cm 05/21/24 0839   Wound Width (cm) 0 cm 05/21/24 0839   Wound Depth (cm) 0 cm 05/21/24 0839   Wound Surface Area (cm^2) 0 cm^2 05/21/24 0839   Wound Volume (cm^3) 0 cm^3 05/21/24 0839   Calculated Wound Volume (cm^3) 0 cm^3 05/21/24 0839   Change in Wound Size % 100 05/21/24 0839   Drainage Amount None 05/21/24 0839   Non-staged Wound Description Not applicable 05/21/24 0839   Dressing Status Intact 05/21/24 0839       /76   Pulse 86   Temp (!) 96.9 °F (36.1 °C)   Resp 18     Physical Exam  Vitals reviewed.   Constitutional:       Appearance: Normal appearance.   HENT:      Head: Normocephalic and atraumatic.   Eyes:      Extraocular Movements: Extraocular movements intact.   Pulmonary:      Effort: Pulmonary effort is normal.   Musculoskeletal:      Cervical back: Neck supple.   Skin:     Comments: Wound fully epithelialized today.  No open wounds or exudate on legs or plantar feet   Neurological:      Mental Status: He is alert.   Psychiatric:          "Mood and Affect: Mood normal.                 Wound Instructions:  Orders Placed This Encounter   Procedures    Wound cleansing and dressings     Wounds are healed today.   May leave open to air.   Continue with Compression stockings 30-40mmhg and edema management.   Cleanse gently with mild soap and water. Moisturize daily  Continue to follow up with podiatry for left foot wound.   Perform daily foot checks  Thank you for using the wound center.     Standing Status:   Future     Standing Expiration Date:   5/28/2024        Diagnosis ICD-10-CM Associated Orders   1. Diabetic ulcer of other part of left foot associated with type 2 diabetes mellitus, with fat layer exposed ([facility])  E11.621 Wound cleansing and dressings    L97.522       2. Non-pressure ulcer of right lower extremity with fat layer exposed ([facility])  L97.912 Wound cleansing and dressings      3. Type 2 diabetes mellitus with hypoglycemia without coma, with long-term current use of insulin ([facility])  E11.649 Wound cleansing and dressings    Z79.4       4. Chronic venous hypertension (idiopathic) with ulcer of right lower extremity (CODE) ([facility])  I87.311 Wound cleansing and dressings          --  Shawnee Wilson MD    \"This note has been constructed using a voice recognition system. Therefore there may be syntax, spelling, and/or grammatical errors. Occasional wrong word or \"sound alike\" substitutions may have occurred due to the inherent limitations of voice recognition software. Read the chart carefully and recognize, using context, where substitutions have occurred. Please call if you have any questions.\"     "

## 2024-05-21 NOTE — PATIENT INSTRUCTIONS
Orders Placed This Encounter   Procedures    Wound cleansing and dressings     Wounds are healed today.   May leave open to air.   Continue with Compression stockings 30-40mmhg and edema management.   Cleanse gently with mild soap and water. Moisturize daily  Continue to follow up with podiatry for left foot wound.   Perform daily foot checks  Thank you for using the wound center.     Standing Status:   Future     Standing Expiration Date:   5/28/2024

## 2024-05-24 LAB
A1AT PHENOTYP SERPL IFE: NORMAL
A1AT SERPL-MCNC: 176 MG/DL (ref 101–187)

## 2024-05-25 NOTE — ANESTHESIA PREPROCEDURE EVALUATION
Procedure:  COLONOSCOPY  EGD    Relevant Problems   ANESTHESIA (within normal limits)      CARDIO   (+) Essential hypertension   (+) Murmur   (-) Angina at rest   (-) Angina of effort   (-) Chest pain   (-) TOWNSEND (dyspnea on exertion)      ENDO   (+) DM2 (diabetes mellitus, type 2) (HCC)      GI/HEPATIC   (+) Alcoholic cirrhosis (HCC)      /RENAL   (+) ABILIO (acute kidney injury) (HCC)   (+) Stage 3a chronic kidney disease (HCC)      HEMATOLOGY   (+) Pancytopenia (HCC)   (+) Thrombocytopenia (HCC)      MUSCULOSKELETAL   (+) Post-traumatic osteoarthritis of left shoulder   (+) Pyogenic arthritis of left shoulder region (HCC)      NEURO/PSYCH   (-) CVA (cerebral vascular accident) (HCC)   (-) Seizures (HCC)      PULMONARY   (+) MEG (obstructive sleep apnea)   (-) Asthma   (-) Chronic obstructive pulmonary disease (HCC)        Physical Exam    Airway    Mallampati score: II  TM Distance: >3 FB  Neck ROM: full     Dental   No notable dental hx     Cardiovascular      Pulmonary      Other Findings        Anesthesia Plan  ASA Score- 2     Anesthesia Type- IV sedation with anesthesia with ASA Monitors.         Additional Monitors:     Airway Plan:            Plan Factors-Exercise tolerance (METS): >4 METS.    Chart reviewed.    Patient summary reviewed.                  Induction- intravenous.    Postoperative Plan-     Informed Consent- Anesthetic plan and risks discussed with patient.  I personally reviewed this patient with the CRNA. Discussed and agreed on the Anesthesia Plan with the CRNA..                 Yes

## 2024-05-28 ENCOUNTER — OFFICE VISIT (OUTPATIENT)
Age: 69
End: 2024-05-28
Payer: MEDICARE

## 2024-05-28 ENCOUNTER — TELEPHONE (OUTPATIENT)
Age: 69
End: 2024-05-28

## 2024-05-28 VITALS
BODY MASS INDEX: 31.92 KG/M2 | DIASTOLIC BLOOD PRESSURE: 69 MMHG | WEIGHT: 228 LBS | HEART RATE: 82 BPM | RESPIRATION RATE: 16 BRPM | SYSTOLIC BLOOD PRESSURE: 124 MMHG | HEIGHT: 71 IN

## 2024-05-28 DIAGNOSIS — E11.621 DIABETIC ULCER OF LEFT MIDFOOT ASSOCIATED WITH TYPE 2 DIABETES MELLITUS, LIMITED TO BREAKDOWN OF SKIN (HCC): Primary | ICD-10-CM

## 2024-05-28 DIAGNOSIS — L97.421 DIABETIC ULCER OF LEFT MIDFOOT ASSOCIATED WITH TYPE 2 DIABETES MELLITUS, LIMITED TO BREAKDOWN OF SKIN (HCC): Primary | ICD-10-CM

## 2024-05-28 DIAGNOSIS — M21.962 ACQUIRED DEFORMITY OF BOTH FEET: ICD-10-CM

## 2024-05-28 DIAGNOSIS — E11.42 DIABETIC POLYNEUROPATHY ASSOCIATED WITH TYPE 2 DIABETES MELLITUS (HCC): ICD-10-CM

## 2024-05-28 DIAGNOSIS — M21.961 ACQUIRED DEFORMITY OF BOTH FEET: ICD-10-CM

## 2024-05-28 PROCEDURE — 99213 OFFICE O/P EST LOW 20 MIN: CPT | Performed by: PODIATRIST

## 2024-05-28 NOTE — TELEPHONE ENCOUNTER
Caller: Alfredo Mariano's office    Doctor and/or Office: Dr. Lucas/[facility]#: [phone]    Escalation: Care Requesting any testing this patient had done to be faxed to [phone]. Thank you

## 2024-05-28 NOTE — PROGRESS NOTES
Assessment/Plan: Preulcer/Lin grade 0 ulcer submetatarsal 2 left foot.  Diabetic neuropathy.  History of ulcer and cellulitis.    Plan.  Chart reviewed.  PCP notes reviewed.  Wound care notes reviewed.  At this time patient is healed.  He is aware of the etiology and nature of his condition.  At this time he is considering surgical intervention to prevent future ulceration.  Patient advised the nature of dislocation of the toe and the nature to fix it.  Pending is indicated.  However patient is very reticent to do this.  We will consider possible other procedures including Weil osteotomy as well as arthroplasty of second and third toe.  At this time patient will continue to use offloading insoles.  He will watch for signs of infection.  Return for follow-up.  He would like to plan surgical intervention for the summer.         Diagnoses and all orders for this visit:    Diabetic ulcer of left midfoot associated with type 2 diabetes mellitus, limited to breakdown of skin (HCC)    Diabetic polyneuropathy associated with type 2 diabetes mellitus (HCC)    Acquired deformity of both feet          Subjective: Patient is status post treatment regimen at wound healing center.  He has chronic wound of left foot.  He presents for evaluation.  He is asking about surgical options to prevent against future ulceration.    Allergies   Allergen Reactions    Sulfa Antibiotics Hives    Daptomycin Other (See Comments)     Possibly contributed to ABILIO on 6/2023 admission          Current Outpatient Medications:     benzonatate (TESSALON PERLES) 100 mg capsule, Take 1 capsule (100 mg total) by mouth every 8 (eight) hours, Disp: 21 capsule, Rfl: 0    bumetanide (BUMEX) 1 mg tablet, Take 1 tablet (1 mg total) by mouth daily Do not start before February 29, 2024., Disp: 30 tablet, Rfl: 0    fluticasone (FLONASE) 50 mcg/act nasal spray, 1 spray into each nostril daily, Disp: 16 g, Rfl: 0    glimepiride (AMARYL) 4 mg tablet, Take 4 mg by  mouth 2 (two) times a day, Disp: , Rfl:     insulin glargine (LANTUS) 100 units/mL subcutaneous injection, Inject 40 Units under the skin daily at bedtime, Disp: , Rfl:     Insulin Pen Needle (BD Pen Needle Nayla 2nd Gen) 32G X 4 MM MISC, For use with insulin pen. Pharmacy may dispense brand covered by insurance., Disp: 100 each, Rfl: 0    Multiple Vitamin (multivitamin) capsule, Take 1 capsule by mouth daily, Disp: 21 capsule, Rfl: 0    pantoprazole (PROTONIX) 40 mg tablet, Take 1 tablet (40 mg total) by mouth daily, Disp: 90 tablet, Rfl: 0    potassium chloride (Klor-Con M20) 20 mEq tablet, Take 1 tablet (20 mEq total) by mouth daily Do not start before February 29, 2024., Disp: 30 tablet, Rfl: 0    tamsulosin (FLOMAX) 0.4 mg, Take 1 capsule (0.4 mg total) by mouth daily with dinner, Disp: 30 capsule, Rfl: 0    traZODone (DESYREL) 50 mg tablet, Take 50 mg by mouth daily at bedtime bedtime, Disp: , Rfl:     Patient Active Problem List   Diagnosis    Alcohol abuse    DM2 (diabetes mellitus, type 2) (HCC)    Essential hypertension    ABILIO (acute kidney injury) (HCC)    Cholelithiasis    Cirrhosis, alcoholic (HCC)    MEG (obstructive sleep apnea)    Pancytopenia (HCC)    Insomnia    Elevated troponin    Chest pressure    Diabetic ulcer of left foot associated with type 2 diabetes mellitus (HCC)    Cellulitis    Obesity, morbid (HCC)    Post-traumatic osteoarthritis of left shoulder    Morbid obesity (HCC)    S/P reverse total shoulder arthroplasty, left    Aftercare following left shoulder joint replacement surgery    Ulcer of left foot, limited to breakdown of skin (HCC)    Postoperative infection of surgical wound    Stage 3a chronic kidney disease (HCC)    Murmur    Urinary retention    Pyogenic arthritis of left shoulder region (HCC)    Perineal abscess    Symptomatic anemia    Volume overload    Chest wall mass    Iron deficiency anemia due to chronic blood loss    Splenomegaly, congestive, chronic          Patient  ID: Isael Avendano is a 68 y.o. male.    HPI    The following portions of the patient's history were reviewed and updated as appropriate:     family history includes COPD in his father; Diabetes in his mother; Heart disease in his father and paternal grandmother; Other in his father; Prostate cancer (age of onset: 82) in his paternal grandfather; Supraventricular tachycardia in his mother.      reports that he quit smoking about 31 years ago. His smoking use included cigarettes. He started smoking about 51 years ago. He has a 10 pack-year smoking history. He has never used smokeless tobacco. He reports that he does not currently use alcohol. He reports that he does not use drugs.    Vitals:    05/28/24 1444   BP: 124/69   Pulse: 82   Resp: 16       Review of Systems      Objective:  Patient's shoes and socks removed.   Foot ExamPhysical Exam      Foot Exam     General  General Appearance: appears stated age and healthy   Orientation: alert and oriented to person, place, and time   Affect: appropriate         Right Foot/Ankle      Inspection and Palpation  Tenderness: none   Swelling: dorsum   Arch: pes cavus  Claw Toes: second toe, fifth toe, fourth toe and third toe  Skin Exam: dry skin;      Neurovascular  Dorsalis pedis: 3+  Posterior tibial: 3+  Saphenous nerve sensation: absent  Tibial nerve sensation: absent  Superficial peroneal nerve sensation: absent  Deep peroneal nerve sensation: absent  Sural nerve sensation: absent        Left Foot/Ankle       Inspection and Palpation  Tenderness: none   Swelling: dorsum   Arch: pes cavus  Claw toes: second toe, fifth toe and fourth toe  Skin Exam: drainage and ulcer;      Neurovascular  Dorsalis pedis: 3+  Posterior tibial: 3+  Saphenous nerve sensation: absent  Tibial nerve sensation: absent  Superficial peroneal nerve sensation: absent  Deep peroneal nerve sensation: absent  Sural nerve sensation: absent        Physical Exam  Vitals and nursing note reviewed.    Constitutional:       Appearance: Normal appearance.   Cardiovascular:      Rate and Rhythm: Normal rate and regular rhythm.      Pulses: no weak pulses.           Dorsalis pedis pulses are 3+ on the right side and 3+ on the left side.        Posterior tibial pulses are 3+ on the right side and 3+ on the left side.   Feet:      Right foot:      Skin integrity: Dry skin present.      Left foot:      Skin integrity: Ulcer present.   Skin:     Capillary Refill: Capillary refill takes less than 2 seconds.      Comments: Patient has a large Lin grade 0 ulcer submetatarsal 2 left foot.  This is secondary to subluxed second MPJ.  Hyperkeratosis noted.  X-ray demonstrates dislocated second MPJ with subluxed third MPJ.  Second metatarsal appears long.  Significant arthrosis right first MPJ  Neurological:      Mental Status: He is alert.   Psychiatric:         Mood and Affect: Mood normal.         Behavior: Behavior normal.         Thought Content: Thought content normal.         Judgment: Judgment normal.      Patient's shoes and socks removed.     Right Foot/Ankle   Right Foot Inspection  Skin Exam: dry skin.      Sensory   Vibration: absent  Proprioception: absent  Monofilament testing: absent     Vascular  Capillary refills: < 3 seconds  The right DP pulse is 3+. The right PT pulse is 3+.      Right Toe  - Comprehensive Exam  Arch: pes cavus  Claw Toes: second toe, fifth toe, fourth toe and third toe  Swelling: dorsum   Tenderness: none         Left Foot/Ankle  Left Foot Inspection  Skin Exam: ulcer.      Sensory   Vibration: absent  Proprioception: absent  Monofilament testing: absent     Vascular  Capillary refills: < 3 seconds  The left DP pulse is 3+. The left PT pulse is 3+.      Left Toe  - Comprehensive Exam  Arch: pes cavus  Claw toes: second toe, fifth toe and fourth toe  Swelling: dorsum   Tenderness: none         Assign Risk Category  Deformity present  Loss of protective sensation  No weak pulses  Risk:  2

## 2024-05-29 ENCOUNTER — TELEPHONE (OUTPATIENT)
Age: 69
End: 2024-05-29

## 2024-05-29 NOTE — TELEPHONE ENCOUNTER
Patients GI provider:  Dr. Tianna Pimentel    Number to return call: [phone]    Reason for call: Venus from radiology called with significant finding on MRI from 05/20/24    Scheduled procedure/appointment date if applicable: Apt/procedure

## 2024-05-30 ENCOUNTER — PREP FOR PROCEDURE (OUTPATIENT)
Dept: GASTROENTEROLOGY | Facility: CLINIC | Age: 69
End: 2024-05-30

## 2024-05-30 ENCOUNTER — OFFICE VISIT (OUTPATIENT)
Dept: GASTROENTEROLOGY | Facility: CLINIC | Age: 69
End: 2024-05-30
Payer: MEDICARE

## 2024-05-30 VITALS
TEMPERATURE: 96.4 F | WEIGHT: 232 LBS | SYSTOLIC BLOOD PRESSURE: 122 MMHG | DIASTOLIC BLOOD PRESSURE: 62 MMHG | BODY MASS INDEX: 32.48 KG/M2 | HEIGHT: 71 IN

## 2024-05-30 DIAGNOSIS — C22.0 HEPATOCELLULAR CARCINOMA (HCC): Primary | ICD-10-CM

## 2024-05-30 DIAGNOSIS — K31.819 GAVE (GASTRIC ANTRAL VASCULAR ECTASIA): ICD-10-CM

## 2024-05-30 DIAGNOSIS — K70.31 ALCOHOLIC CIRRHOSIS OF LIVER WITH ASCITES (HCC): ICD-10-CM

## 2024-05-30 DIAGNOSIS — K70.31 ALCOHOLIC CIRRHOSIS OF LIVER WITH ASCITES (HCC): Primary | ICD-10-CM

## 2024-05-30 DIAGNOSIS — I85.10 SECONDARY ESOPHAGEAL VARICES WITHOUT BLEEDING (HCC): ICD-10-CM

## 2024-05-30 PROCEDURE — 99215 OFFICE O/P EST HI 40 MIN: CPT | Performed by: INTERNAL MEDICINE

## 2024-05-30 PROCEDURE — G2211 COMPLEX E/M VISIT ADD ON: HCPCS | Performed by: INTERNAL MEDICINE

## 2024-05-30 NOTE — H&P (VIEW-ONLY)
[facility]  Outpatient Follow-up  Encounter: [id number]    PATIENT INFO     Name: Isael Avendano  YOB: 1955   Age: 68 y.o.   Sex: male   MRN: [id number]    ASSESSMENT & PLAN     Isael Avendano is a 68 y.o. male PMH significant for DM2, MEG, history of HTN off antihypertensives, CKD3, pancytopenia and alcohol use disorder in remission who presents today for follow up of cirrhosis. Patient with history of liver lesions being monitored with serial imaging. Most recent MRI completed 5/20 with new diagnosis of LI-RADS 5 lesion for which patient presents to discuss management.     Diagnoses and all orders for this visit:    Hepatocellular carcinoma (HCC)  -     Ambulatory Referral to Interventional Radiology; Future  -     AFP tumor marker; Future  Alcoholic cirrhosis of liver complicated by ascites and nonbleeding EV(HCC)  MELD 3.0: 12 at 5/17/2024  7:32 AM  MELD-Na: 10 at 5/17/2024  7:32 AM  Calculated from:  Serum Creatinine: 1.01 mg/dL at 5/17/2024  7:32 AM  Serum Sodium: 135 mmol/L at 5/17/2024  7:32 AM  Total Bilirubin: 1.54 mg/dL at 5/17/2024  7:32 AM  Serum Albumin: 3.2 g/dL at 5/17/2024  7:32 AM  INR(ratio): 1.19 at 5/17/2024  7:32 AM  Age at listing (hypothetical): 68 years  Sex: Male at 5/17/2024  7:32 AM  diagnosed with cirrhosis in March 2023 following evaluation for abnormal liver tests in the setting of excessive alcohol consumption and thrombocytopenia. Ultrasound at that time notable for nodular hepatic contour and hepatosplenomegaly as well as a US elastography confirming F4 disease. His liver disease has been fairly compensated aside from nonbleeding esophageal varices and ascites.        -     CBC and differential; Future  -     Comprehensive metabolic panel; Future  -     Protime-INR; Future    Secondary esophageal varices without bleeding (HCC)  GAVE (gastric antral vascular ectasia)  Overdue for repeat EGD for repeat banding and treatment of GAVE if  needed.  -     EGD Banding; Future     Ascites   - Euvolemic on exam today  - managed on Bumex 1mg daily  - No further paracentesis needed since 2/2024     Esophageal Varices   - EGD with banding done 12/2023, 2/2024, 3/2024, and 4/2024  - Due for repeat - will schedule  - discussed S/S of GI bleeding     Hepatic Encephalopathy   - No history or evidence of HE on exam today.   - Patient aware of signs/sxs's of HE and advised to promptly inform provider if experiencing such.     HCC  - MRI (5/20/24) - 5.0 cm segment 7 LI- RADS LR-5 observation. 2.0 cm segment 2/4A LI- RADS LR-4 observation. Unchanged segment 4A and segment 4B LI- RADS 3 observations. New segment 6 and segment 5 LI- RADS 3 observations  - MRI (2/19/2024) limited due to ascites and large body habitus notable for 2 hepatic segment 4 LI-RADS 3 observations stable in size and nonvisualization of 2 additional hepatic segment 2 and 6 smaller LI-RADS 3 observations seen on prior MRI.  - Patient also with an uptrending AFP; 5.4 (3/17/2023), 29.68 (9/1/2023), 58.59 (2/21/2024), 67.67 (3/1/24)  - Recheck AFP now  - Patient referred to IR for discussion of treatment  - Has follow up with oncology at the end of June  - Will discuss at tumor board for recommendations of management on 6/7     Nutritional Status  - No sarcopenia noted one exam today   - Encouraged high-protein diet in addition to a high-protein snack qHS to prevent prolonged fasting.      Vaccines   - Recommend vaccination for hep A and hep B with lack of immunity on recent labs. Follow up with PCP to pursue vaccination    FOLLOW-UP: 6 weeks    HISTORY OF PRESENT ILLNESS       Isael Avendano is a 68 y.o. male PMH significant for DM2, MEG, history of HTN off antihypertensives, CKD3, pancytopenia and alcohol use disorder in remission who presents today for follow up of cirrhosis. Patient with history of liver lesions being monitored with serial imaging. Most recent MRI completed 5/20 with new diagnosis  of LI-RADS 5 lesion for which patient presents to discuss management.      Prior hospitalization (2/2024) for abdominal distention and b/l ANY attributed to ascites s/p paracentesis with fluid analysis c/w portal hypertensive ascites.  He was also noted to have significant BOBY s/p EGD showing (2/20/2024) notable for 3 medium grade 2 varices s/p banding (x 4) and suspected GAVE s/p APC. He also had a repeat AFP which continues to uptrend (58.59) and an MRI which was unfortunately limited due to ascites and large body habitus but notable for 2 hepatic segment 4 LI-RADS 3 observations stable in size and nonvisualization of 2 additional hepatic segment 2 and 6 smaller LI-RADS 3 observations seen on prior MRI.     Case discussed at liver tumor board with recommendation to continue monitoring and repeat imaging in 3 months. Repeat imaging completed 5/20: 5.0 cm segment 7 LI- RADS LR-5 observation. 2.0 cm segment 2/4A LI- RADS LR-4 observation. Unchanged segment 4A and segment 4B LI- RADS 3 observations. New segment 6 and segment 5 LI- RADS 3 observations    Overall, doing well. No decompensation since hospitalization in February with no need for repeat paracentesis. No signs of GI bleeding. No episodes of HE.     ENDOSCOPIC HISTORY     UPPER ENDOSCOPY: EGD with banding done 12/19/23, 2/20/24, 3/21/24, 4/24/24 - also with multiple APC treatments for GAVE  COLONOSCOPY: 12/19/23 - small hemorrhoids, removal of single TA - repeat in 3 years pending overall health    REVIEW OF SYSTEMS     CONSTITUTIONAL: Denies any fever, chills, rigors, and weight loss  HEENT: No earache or tinnitus, denies hearing loss or visual disturbances  CARDIOVASCULAR: No chest pain or palpitations  RESPIRATORY: Denies any cough, hemoptysis, shortness of breath or dyspnea on exertion  GASTROINTESTINAL: As noted in the History of Present Illness  GENITOURINARY: No problems with urination, denies any hematuria or dysuria  NEUROLOGIC: No dizziness or  vertigo, denies headaches   MUSCULOSKELETAL: Denies any muscle or joint pain   SKIN: Denies skin rashes or itching  ENDOCRINE: Denies excessive thirst, denies intolerance to heat or cold  PSYCHOSOCIAL: Denies depression or anxiety, denies any recent memory loss     Historical Information   Past Medical History:   Diagnosis Date    Arrhythmia     Chronic kidney disease     COVID 12/2020    CPAP (continuous positive airway pressure) dependence     Diabetes mellitus (HCC)     History of echocardiogram 11/14/2017    showed EF of 50-55 percentWith moderate LVH and left ventricle diastolic dysfunction. Left atrium was moderately enlarged. Trace MR noted.     History of Holter monitoring 11/21/2017    showed baseline rhythm of sinus origin with an average heart it of 61 bpm. The lowest heart rate was 49 and the highest heart rate was 10 8 bpm. There were rare single VPCs, and frequent PACs representing 3.2% of total beats. There were several episodes of sinus arrhythmias with sinus bradycardia and heart rate ranging from 40-90 bpm. No sustained dysrhythmias, or pauses noted. The patient did not    History of transfusion 04/2023    platelets    Hypertension     Liver disease     cirhosis    Murmur, cardiac     Obese     Sleep apnea      Past Surgical History:   Procedure Laterality Date    CATARACT EXTRACTION      EYE SURGERY Left 1997    FL GUIDED NEEDLE PLAC BX/ASP/INJ  8/28/2023    HERNIA REPAIR  [id number]    IR PARACENTESIS  2/19/2024    IR PARACENTESIS  2/21/2024    JOINT REPLACEMENT Right     TKR    KNEE ARTHROPLASTY Right 2008    SD ARTHROPLASTY GLENOHUMERAL JOINT TOTAL SHOULDER Left 04/04/2023    Procedure: ARTHROPLASTY SHOULDER REVERSE;  Surgeon: Marcelo Lester MD;  Location: [facility];  Service: Orthopedics    SD JARED SHOULDER ARTHRPLSTY HUMERAL&GLENOID COMPNT Left 9/8/2023    Procedure: removal of antibiotic spacer, incision and debridement,  with revision reverse shoulder arthroplasty;  Surgeon:  Lita Aguilar;  Location: [facility];  Service: Orthopedics    TX JARED SHOULDER ARTHRPLSTY HUMERAL/GLENOID COMPNT Left 2023    Procedure: ARTHROPLASTY SHOULDER REVISION;  Surgeon: Lita Aguilar;  Location: [facility];  Service: Orthopedics    SHOULDER SURGERY Right 2002    WOUND DEBRIDEMENT Left 2023    Procedure: INCISION AND DRAINAGE (I&D) EXTREMITY, vac placement;  Surgeon: Marcelo Lester MD;  Location: [facility];  Service: Orthopedics     Social History   Social History     Substance and Sexual Activity   Alcohol Use Not Currently    Comment: quit      Social History     Substance and Sexual Activity   Drug Use Never     Social History     Tobacco Use   Smoking Status Former    Current packs/day: 0.00    Average packs/day: 0.5 packs/day for 20.0 years (10.0 ttl pk-yrs)    Types: Cigarettes    Start date:     Quit date:     Years since quittin.4   Smokeless Tobacco Never     Family History   Problem Relation Age of Onset    Diabetes Mother     Supraventricular tachycardia Mother     COPD Father     Heart disease Father     Other Father         Sepsis; related to UTI with complicating cardiac problems    Heart disease Paternal Grandmother     Prostate cancer Paternal Grandfather 82         MEDICATIONS AND ALLERGIES     Current Outpatient Medications   Medication Instructions    benzonatate (TESSALON PERLES) 100 mg, Oral, Every 8 hours    bumetanide (BUMEX) 1 mg, Oral, Daily    fluticasone (FLONASE) 50 mcg/act nasal spray 1 spray, Nasal, Daily    glimepiride (AMARYL) 4 mg, Oral, 2 times daily    insulin glargine (LANTUS) 40 Units, Subcutaneous, Daily at bedtime    Insulin Pen Needle (BD Pen Needle Nayla 2nd Gen) 32G X 4 MM MISC For use with insulin pen. Pharmacy may dispense brand covered by insurance.    Multiple Vitamin (multivitamin) capsule 1 capsule, Oral, Daily    pantoprazole (PROTONIX) 40 mg, Oral, Daily    potassium chloride (Klor-Con M20) 20 mEq tablet 20 mEq, Oral,  Daily    tamsulosin (FLOMAX) 0.4 mg, Oral, Daily with dinner    traZODone (DESYREL) 50 mg, Oral, Daily at bedtime, bedtime     Allergies   Allergen Reactions    Sulfa Antibiotics Hives    Daptomycin Other (See Comments)     Possibly contributed to ABILIO on 6/2023 admission        PHYSICAL EXAM      Objective   There were no vitals taken for this visit. There is no height or weight on file to calculate BMI.    General Appearance:   Alert, cooperative, no distress   HEENT:   Normocephalic, atraumatic, anicteric     Neck:   Supple, symmetrical, trachea midline   Lungs:   Equal chest rise, respirations unlabored    Heart:   Regular rate and rhythm   Abdomen:   Soft, non-tender, non-distended; normal bowel sounds; no masses, no organomegaly    Rectal:   Deferred    Extremities:   No cyanosis, clubbing or edema    Neuro:   Moves all 4 extremities    Skin:   No jaundice, rashes, or lesions      LABORATORY RESULTS     No visits with results within 1 Day(s) from this visit.   Latest known visit with results is:   Appointment on 05/17/2024   Component Date Value    A-1 Antitrypsin 05/17/2024 176     A-1 Antitrypsin Pheno 05/17/2024 MM     LIAN 05/17/2024 Negative     Mitochondrial Ab 05/17/2024 <20.0     Smooth Muscle Ab 05/17/2024 11     IGA 05/17/2024 1,292 (H)     IGG 05/17/2024 2,020 (H)     IGM 05/17/2024 137     Hep A Total Ab 05/17/2024 Non-reactive     Hep B S Ab 05/17/2024 <3.00     WBC 05/17/2024 2.92 (L)     RBC 05/17/2024 4.17     Hemoglobin 05/17/2024 11.1 (L)     Hematocrit 05/17/2024 35.4 (L)     MCV 05/17/2024 85     MCH 05/17/2024 26.6 (L)     MCHC 05/17/2024 31.4     RDW 05/17/2024 14.9     Platelets 05/17/2024 79 (L)     nRBC 05/17/2024 0     Segmented % 05/17/2024 66     Immature Grans % 05/17/2024 0     Lymphocytes % 05/17/2024 12 (L)     Monocytes % 05/17/2024 8     Eosinophils Relative 05/17/2024 13 (H)     Basophils Relative 05/17/2024 1     Absolute Neutrophils 05/17/2024 1.91     Absolute Immature Grans  05/17/2024 0.01     Absolute Lymphocytes 05/17/2024 0.36 (L)     Absolute Monocytes 05/17/2024 0.24     Eosinophils Absolute 05/17/2024 0.38     Basophils Absolute 05/17/2024 0.02     Sodium 05/17/2024 135     Potassium 05/17/2024 4.2     Chloride 05/17/2024 102     CO2 05/17/2024 26     ANION GAP 05/17/2024 7     BUN 05/17/2024 19     Creatinine 05/17/2024 1.01     Glucose, Fasting 05/17/2024 155 (H)     Calcium 05/17/2024 9.5     Corrected Calcium 05/17/2024 10.1     AST 05/17/2024 58 (H)     ALT 05/17/2024 41     Alkaline Phosphatase 05/17/2024 183 (H)     Total Protein 05/17/2024 8.1     Albumin 05/17/2024 3.2 (L)     Total Bilirubin 05/17/2024 1.54 (H)     eGFR 05/17/2024 75     Protime 05/17/2024 15.0 (H)     INR 05/17/2024 1.19     Cholesterol 05/17/2024 144     Triglycerides 05/17/2024 92     HDL, Direct 05/17/2024 34 (L)     LDL Calculated 05/17/2024 92     Non-HDL-Chol (CHOL-HDL) 05/17/2024 110     Hemoglobin A1C 05/17/2024 9.9 (H)     EAG 05/17/2024 237     Creatinine, Ur 05/17/2024 107.8     Albumin,U,Random 05/17/2024 25.5 (H)     Albumin Creat Ratio 05/17/2024 24     Sed Rate 05/17/2024 105 (H)     TSH 3RD GENERATON 05/17/2024 2.520     Color, UA 05/17/2024 Yellow     Clarity, UA 05/17/2024 Clear     Specific Gravity, UA 05/17/2024 1.016     pH, UA 05/17/2024 6.5     Leukocytes, UA 05/17/2024 Negative     Nitrite, UA 05/17/2024 Negative     Protein, UA 05/17/2024 Trace (A)     Glucose, UA 05/17/2024 500 (1/2%) (A)     Ketones, UA 05/17/2024 Negative     Urobilinogen, UA 05/17/2024 2.0 (A)     Bilirubin, UA 05/17/2024 Negative     Occult Blood, UA 05/17/2024 Negative     RBC, UA 05/17/2024 1-2     WBC, UA 05/17/2024 1-2     Epithelial Cells 05/17/2024 Occasional     Bacteria, UA 05/17/2024 Occasional     Hyaline Casts, UA 05/17/2024 0-3 (A)      MRI abdomen w wo contrast    Result Date: 5/29/2024  Narrative: MRI - ABDOMEN - WITH AND WITHOUT CONTRAST INDICATION: 68 years / Male. K74.60: Unspecified  "cirrhosis of liver R18.8: Other ascites K76.9: Liver disease, unspecified. COMPARISON: Numerous prior studies, the most recent is MRI abdomen 2/19/2024. TECHNIQUE: Multiplanar/multisequence MRI of the abdomen was performed before and after administration of contrast. IV Contrast: 10 mL of Gadobutrol injection (SINGLE-DOSE) FINDINGS: LOWER CHEST: Paraesophageal varices. LIVER: Normal in size and configuration. Observation: 1 Size: 5.0 x 3.1 cm #13/56. Location: Segment 7 Enhancement: Nonrim arterial phase hyperenhancement. Nonperipheral \"washout\": Yes Enhancing \"capsule\": No Threshold growth: Yes Ancillary features: *  Favoring HCC in particular: None *  Favoring malignancy: Mild T2 hyperintensity with restricted diffusion *  Favoring benignity: None LI-RADS Category: LR-5. Patent hepatic and portal veins. Observation: 2 Size: 2.0 x 1.8 cm #13/58. Location: Subcapsular location segment 2/4A Enhancement: Rim arterial phase hyperenhancement. Nonperipheral \"washout\": No Enhancing \"capsule\": Yes Threshold growth: Yes Ancillary features: *  Favoring HCC in particular: None *  Favoring malignancy: Mild T2 hyperintensity with restricted diffusion *  Favoring benignity: None LI-RADS Category: LR-4 Patent hepatic and portal veins. 19 mm segment 4B observation with non-rim arterial phase enhancement is unchanged since 10/23/2023 when remeasured with identical technique. LI LI- RADS LR-3. 20 mm segment 4A observation #13/38 is also unchanged when remeasured with similar technique. Reidentified non-rim arterial phase enhancement which is more subtle on today's study. LI- RADS LR-3. Similar 12 mm segment 6, #13/81, and segment 5 subcapsular 9 mm, #13/86, LI- RADS 3 observations with non-rim arterial phase enhancement. BILE DUCTS: No intrahepatic or extrahepatic bile duct dilation. GALLBLADDER: Cholelithiasis without findings of acute cholecystitis. PANCREAS: Unremarkable. ADRENAL GLANDS: Splenomegaly SPLEEN: With craniocaudal " length of 19.5 cm. KIDNEYS/PROXIMAL URETERS: No hydroureteronephrosis. No suspicious renal mass. Simple cysts BOWEL: No dilated loops of bowel. PERITONEUM/RETROPERITONEUM: No significant ascites. LYMPH NODES: Unchanged dominant 11 mm fernandez hepatic node #5/23. VESSELS: No aneurysm. ABDOMINAL WALL: Uncomplicated fat-containing paraumbilical hernia. BONES: No suspicious osseous lesion.     Impression: 5.0 cm segment 7 LI- RADS LR-5 observation. 2.0 cm segment 2/4A LI- RADS LR-4 observation. Unchanged segment 4A and segment 4B LI- RADS 3 observations. New segment 6 and segment 5 LI- RADS 3 observations The study was marked in EPIC for significant notification. Workstation performed: [id number]     XR toe second min 2 views LEFT    Result Date: 5/3/2024  Narrative: XR TOE LEFT SECOND MIN 2 VIEWS INDICATION: cellulitis. COMPARISON: Left toe radiographs 3/12/2024 FINDINGS: No acute fracture or dislocation. Hammertoe deformities. Severe first MTP osteoarthritis. No lytic or blastic osseous lesion. Unremarkable soft tissues.     Impression: No radiographic evidence of osteomyelitis. Workstation performed: [id number]     XR tibia fibula 2 views RIGHT    Result Date: 5/3/2024  Narrative: XR TIBIA FIBULA 2 VW RIGHT INDICATION: cellulitis. COMPARISON: None FINDINGS: No acute fracture or dislocation. Prior total knee arthroplasty. Small plantar calcaneal spur. No lytic or blastic osseous lesion. Diffuse soft tissue swelling. No soft tissue gas. Multifocal enthesopathy.     Impression: No acute osseous abnormality. Workstation performed: [id number]     XR tibia fibula 2 views LEFT    Result Date: 5/3/2024  Narrative: XR TIBIA FIBULA 2 VW LEFT INDICATION: cellulitis. COMPARISON: None FINDINGS: No acute fracture or dislocation. Degenerative changes in the knee. Small plantar calcaneal spur. No lytic or blastic osseous lesion. Diffuse soft tissue swelling. No soft tissue gas. Minimal lower pretibial soft tissue calcification.  Multifocal enthesopathy.     Impression: No acute osseous abnormality. Workstation performed: [id number]     XR chest 1 view portable    Result Date: 5/2/2024  Narrative: XR CHEST PORTABLE INDICATION: cough. COMPARISON: Chest CT 2/26/2024, CXR 2/18/2024. FINDINGS: Clear lungs. No pneumothorax or pleural effusion. Mild cardiomegaly. Bones are unremarkable for age. Left shoulder arthroplasty. High riding right humeral head suggestive of chronic rotator cuff tear. Normal upper abdomen.     Impression: No acute cardiopulmonary disease. Workstation performed: [id number]       RADIOLOGY RESULTS: I have personally reviewed pertinent imaging studies.      Leona Keith D.O.  Gastroenterology Fellow  [facility]  Division of Gastroenterology & Hepatology  Available of [employer]    ** Please Note: This note is constructed using a voice recognition dictation system. **

## 2024-05-30 NOTE — PROGRESS NOTES
[facility]  Outpatient Follow-up  Encounter: [id number]    PATIENT INFO     Name: Isael Avendano  YOB: 1955   Age: 68 y.o.   Sex: male   MRN: [id number]    ASSESSMENT & PLAN     Isael Avendano is a 68 y.o. male PMH significant for DM2, MEG, history of HTN off antihypertensives, CKD3, pancytopenia and alcohol use disorder in remission who presents today for follow up of cirrhosis. Patient with history of liver lesions being monitored with serial imaging. Most recent MRI completed 5/20 with new diagnosis of LI-RADS 5 lesion for which patient presents to discuss management.     Diagnoses and all orders for this visit:    Hepatocellular carcinoma (HCC)  -     Ambulatory Referral to Interventional Radiology; Future  -     AFP tumor marker; Future  Alcoholic cirrhosis of liver complicated by ascites and nonbleeding EV(HCC)  MELD 3.0: 12 at 5/17/2024  7:32 AM  MELD-Na: 10 at 5/17/2024  7:32 AM  Calculated from:  Serum Creatinine: 1.01 mg/dL at 5/17/2024  7:32 AM  Serum Sodium: 135 mmol/L at 5/17/2024  7:32 AM  Total Bilirubin: 1.54 mg/dL at 5/17/2024  7:32 AM  Serum Albumin: 3.2 g/dL at 5/17/2024  7:32 AM  INR(ratio): 1.19 at 5/17/2024  7:32 AM  Age at listing (hypothetical): 68 years  Sex: Male at 5/17/2024  7:32 AM  diagnosed with cirrhosis in March 2023 following evaluation for abnormal liver tests in the setting of excessive alcohol consumption and thrombocytopenia. Ultrasound at that time notable for nodular hepatic contour and hepatosplenomegaly as well as a US elastography confirming F4 disease. His liver disease has been fairly compensated aside from nonbleeding esophageal varices and ascites.        -     CBC and differential; Future  -     Comprehensive metabolic panel; Future  -     Protime-INR; Future    Secondary esophageal varices without bleeding (HCC)  GAVE (gastric antral vascular ectasia)  Overdue for repeat EGD for repeat banding and treatment of GAVE if  needed.  -     EGD Banding; Future     Ascites   - Euvolemic on exam today  - managed on Bumex 1mg daily  - No further paracentesis needed since 2/2024     Esophageal Varices   - EGD with banding done 12/2023, 2/2024, 3/2024, and 4/2024  - Due for repeat - will schedule  - discussed S/S of GI bleeding     Hepatic Encephalopathy   - No history or evidence of HE on exam today.   - Patient aware of signs/sxs's of HE and advised to promptly inform provider if experiencing such.     HCC  - MRI (5/20/24) - 5.0 cm segment 7 LI- RADS LR-5 observation. 2.0 cm segment 2/4A LI- RADS LR-4 observation. Unchanged segment 4A and segment 4B LI- RADS 3 observations. New segment 6 and segment 5 LI- RADS 3 observations  - MRI (2/19/2024) limited due to ascites and large body habitus notable for 2 hepatic segment 4 LI-RADS 3 observations stable in size and nonvisualization of 2 additional hepatic segment 2 and 6 smaller LI-RADS 3 observations seen on prior MRI.  - Patient also with an uptrending AFP; 5.4 (3/17/2023), 29.68 (9/1/2023), 58.59 (2/21/2024), 67.67 (3/1/24)  - Recheck AFP now  - Patient referred to IR for discussion of treatment  - Has follow up with oncology at the end of June  - Will discuss at tumor board for recommendations of management on 6/7     Nutritional Status  - No sarcopenia noted one exam today   - Encouraged high-protein diet in addition to a high-protein snack qHS to prevent prolonged fasting.      Vaccines   - Recommend vaccination for hep A and hep B with lack of immunity on recent labs. Follow up with PCP to pursue vaccination    FOLLOW-UP: 6 weeks    HISTORY OF PRESENT ILLNESS       Isael Avendano is a 68 y.o. male PMH significant for DM2, MEG, history of HTN off antihypertensives, CKD3, pancytopenia and alcohol use disorder in remission who presents today for follow up of cirrhosis. Patient with history of liver lesions being monitored with serial imaging. Most recent MRI completed 5/20 with new diagnosis  of LI-RADS 5 lesion for which patient presents to discuss management.      Prior hospitalization (2/2024) for abdominal distention and b/l ANY attributed to ascites s/p paracentesis with fluid analysis c/w portal hypertensive ascites.  He was also noted to have significant BOBY s/p EGD showing (2/20/2024) notable for 3 medium grade 2 varices s/p banding (x 4) and suspected GAVE s/p APC. He also had a repeat AFP which continues to uptrend (58.59) and an MRI which was unfortunately limited due to ascites and large body habitus but notable for 2 hepatic segment 4 LI-RADS 3 observations stable in size and nonvisualization of 2 additional hepatic segment 2 and 6 smaller LI-RADS 3 observations seen on prior MRI.     Case discussed at liver tumor board with recommendation to continue monitoring and repeat imaging in 3 months. Repeat imaging completed 5/20: 5.0 cm segment 7 LI- RADS LR-5 observation. 2.0 cm segment 2/4A LI- RADS LR-4 observation. Unchanged segment 4A and segment 4B LI- RADS 3 observations. New segment 6 and segment 5 LI- RADS 3 observations    Overall, doing well. No decompensation since hospitalization in February with no need for repeat paracentesis. No signs of GI bleeding. No episodes of HE.     ENDOSCOPIC HISTORY     UPPER ENDOSCOPY: EGD with banding done 12/19/23, 2/20/24, 3/21/24, 4/24/24 - also with multiple APC treatments for GAVE  COLONOSCOPY: 12/19/23 - small hemorrhoids, removal of single TA - repeat in 3 years pending overall health    REVIEW OF SYSTEMS     CONSTITUTIONAL: Denies any fever, chills, rigors, and weight loss  HEENT: No earache or tinnitus, denies hearing loss or visual disturbances  CARDIOVASCULAR: No chest pain or palpitations  RESPIRATORY: Denies any cough, hemoptysis, shortness of breath or dyspnea on exertion  GASTROINTESTINAL: As noted in the History of Present Illness  GENITOURINARY: No problems with urination, denies any hematuria or dysuria  NEUROLOGIC: No dizziness or  vertigo, denies headaches   MUSCULOSKELETAL: Denies any muscle or joint pain   SKIN: Denies skin rashes or itching  ENDOCRINE: Denies excessive thirst, denies intolerance to heat or cold  PSYCHOSOCIAL: Denies depression or anxiety, denies any recent memory loss     Historical Information   Past Medical History:   Diagnosis Date    Arrhythmia     Chronic kidney disease     COVID 12/2020    CPAP (continuous positive airway pressure) dependence     Diabetes mellitus (HCC)     History of echocardiogram 11/14/2017    showed EF of 50-55 percentWith moderate LVH and left ventricle diastolic dysfunction. Left atrium was moderately enlarged. Trace MR noted.     History of Holter monitoring 11/21/2017    showed baseline rhythm of sinus origin with an average heart it of 61 bpm. The lowest heart rate was 49 and the highest heart rate was 10 8 bpm. There were rare single VPCs, and frequent PACs representing 3.2% of total beats. There were several episodes of sinus arrhythmias with sinus bradycardia and heart rate ranging from 40-90 bpm. No sustained dysrhythmias, or pauses noted. The patient did not    History of transfusion 04/2023    platelets    Hypertension     Liver disease     cirhosis    Murmur, cardiac     Obese     Sleep apnea      Past Surgical History:   Procedure Laterality Date    CATARACT EXTRACTION      EYE SURGERY Left 1997    FL GUIDED NEEDLE PLAC BX/ASP/INJ  8/28/2023    HERNIA REPAIR  [id number]    IR PARACENTESIS  2/19/2024    IR PARACENTESIS  2/21/2024    JOINT REPLACEMENT Right     TKR    KNEE ARTHROPLASTY Right 2008    PA ARTHROPLASTY GLENOHUMERAL JOINT TOTAL SHOULDER Left 04/04/2023    Procedure: ARTHROPLASTY SHOULDER REVERSE;  Surgeon: Marcelo Lester MD;  Location: [facility];  Service: Orthopedics    PA JARED SHOULDER ARTHRPLSTY HUMERAL&GLENOID COMPNT Left 9/8/2023    Procedure: removal of antibiotic spacer, incision and debridement,  with revision reverse shoulder arthroplasty;  Surgeon:  Lita Aguilar;  Location: [facility];  Service: Orthopedics    KS JARED SHOULDER ARTHRPLSTY HUMERAL/GLENOID COMPNT Left 2023    Procedure: ARTHROPLASTY SHOULDER REVISION;  Surgeon: Lita Aguilar;  Location: [facility];  Service: Orthopedics    SHOULDER SURGERY Right 2002    WOUND DEBRIDEMENT Left 2023    Procedure: INCISION AND DRAINAGE (I&D) EXTREMITY, vac placement;  Surgeon: Marcelo Lester MD;  Location: [facility];  Service: Orthopedics     Social History   Social History     Substance and Sexual Activity   Alcohol Use Not Currently    Comment: quit      Social History     Substance and Sexual Activity   Drug Use Never     Social History     Tobacco Use   Smoking Status Former    Current packs/day: 0.00    Average packs/day: 0.5 packs/day for 20.0 years (10.0 ttl pk-yrs)    Types: Cigarettes    Start date:     Quit date:     Years since quittin.4   Smokeless Tobacco Never     Family History   Problem Relation Age of Onset    Diabetes Mother     Supraventricular tachycardia Mother     COPD Father     Heart disease Father     Other Father         Sepsis; related to UTI with complicating cardiac problems    Heart disease Paternal Grandmother     Prostate cancer Paternal Grandfather 82         MEDICATIONS AND ALLERGIES     Current Outpatient Medications   Medication Instructions    benzonatate (TESSALON PERLES) 100 mg, Oral, Every 8 hours    bumetanide (BUMEX) 1 mg, Oral, Daily    fluticasone (FLONASE) 50 mcg/act nasal spray 1 spray, Nasal, Daily    glimepiride (AMARYL) 4 mg, Oral, 2 times daily    insulin glargine (LANTUS) 40 Units, Subcutaneous, Daily at bedtime    Insulin Pen Needle (BD Pen Needle Nayla 2nd Gen) 32G X 4 MM MISC For use with insulin pen. Pharmacy may dispense brand covered by insurance.    Multiple Vitamin (multivitamin) capsule 1 capsule, Oral, Daily    pantoprazole (PROTONIX) 40 mg, Oral, Daily    potassium chloride (Klor-Con M20) 20 mEq tablet 20 mEq, Oral,  Daily    tamsulosin (FLOMAX) 0.4 mg, Oral, Daily with dinner    traZODone (DESYREL) 50 mg, Oral, Daily at bedtime, bedtime     Allergies   Allergen Reactions    Sulfa Antibiotics Hives    Daptomycin Other (See Comments)     Possibly contributed to ABILIO on 6/2023 admission        PHYSICAL EXAM      Objective   There were no vitals taken for this visit. There is no height or weight on file to calculate BMI.    General Appearance:   Alert, cooperative, no distress   HEENT:   Normocephalic, atraumatic, anicteric     Neck:   Supple, symmetrical, trachea midline   Lungs:   Equal chest rise, respirations unlabored    Heart:   Regular rate and rhythm   Abdomen:   Soft, non-tender, non-distended; normal bowel sounds; no masses, no organomegaly    Rectal:   Deferred    Extremities:   No cyanosis, clubbing or edema    Neuro:   Moves all 4 extremities    Skin:   No jaundice, rashes, or lesions      LABORATORY RESULTS     No visits with results within 1 Day(s) from this visit.   Latest known visit with results is:   Appointment on 05/17/2024   Component Date Value    A-1 Antitrypsin 05/17/2024 176     A-1 Antitrypsin Pheno 05/17/2024 MM     LIAN 05/17/2024 Negative     Mitochondrial Ab 05/17/2024 <20.0     Smooth Muscle Ab 05/17/2024 11     IGA 05/17/2024 1,292 (H)     IGG 05/17/2024 2,020 (H)     IGM 05/17/2024 137     Hep A Total Ab 05/17/2024 Non-reactive     Hep B S Ab 05/17/2024 <3.00     WBC 05/17/2024 2.92 (L)     RBC 05/17/2024 4.17     Hemoglobin 05/17/2024 11.1 (L)     Hematocrit 05/17/2024 35.4 (L)     MCV 05/17/2024 85     MCH 05/17/2024 26.6 (L)     MCHC 05/17/2024 31.4     RDW 05/17/2024 14.9     Platelets 05/17/2024 79 (L)     nRBC 05/17/2024 0     Segmented % 05/17/2024 66     Immature Grans % 05/17/2024 0     Lymphocytes % 05/17/2024 12 (L)     Monocytes % 05/17/2024 8     Eosinophils Relative 05/17/2024 13 (H)     Basophils Relative 05/17/2024 1     Absolute Neutrophils 05/17/2024 1.91     Absolute Immature Grans  05/17/2024 0.01     Absolute Lymphocytes 05/17/2024 0.36 (L)     Absolute Monocytes 05/17/2024 0.24     Eosinophils Absolute 05/17/2024 0.38     Basophils Absolute 05/17/2024 0.02     Sodium 05/17/2024 135     Potassium 05/17/2024 4.2     Chloride 05/17/2024 102     CO2 05/17/2024 26     ANION GAP 05/17/2024 7     BUN 05/17/2024 19     Creatinine 05/17/2024 1.01     Glucose, Fasting 05/17/2024 155 (H)     Calcium 05/17/2024 9.5     Corrected Calcium 05/17/2024 10.1     AST 05/17/2024 58 (H)     ALT 05/17/2024 41     Alkaline Phosphatase 05/17/2024 183 (H)     Total Protein 05/17/2024 8.1     Albumin 05/17/2024 3.2 (L)     Total Bilirubin 05/17/2024 1.54 (H)     eGFR 05/17/2024 75     Protime 05/17/2024 15.0 (H)     INR 05/17/2024 1.19     Cholesterol 05/17/2024 144     Triglycerides 05/17/2024 92     HDL, Direct 05/17/2024 34 (L)     LDL Calculated 05/17/2024 92     Non-HDL-Chol (CHOL-HDL) 05/17/2024 110     Hemoglobin A1C 05/17/2024 9.9 (H)     EAG 05/17/2024 237     Creatinine, Ur 05/17/2024 107.8     Albumin,U,Random 05/17/2024 25.5 (H)     Albumin Creat Ratio 05/17/2024 24     Sed Rate 05/17/2024 105 (H)     TSH 3RD GENERATON 05/17/2024 2.520     Color, UA 05/17/2024 Yellow     Clarity, UA 05/17/2024 Clear     Specific Gravity, UA 05/17/2024 1.016     pH, UA 05/17/2024 6.5     Leukocytes, UA 05/17/2024 Negative     Nitrite, UA 05/17/2024 Negative     Protein, UA 05/17/2024 Trace (A)     Glucose, UA 05/17/2024 500 (1/2%) (A)     Ketones, UA 05/17/2024 Negative     Urobilinogen, UA 05/17/2024 2.0 (A)     Bilirubin, UA 05/17/2024 Negative     Occult Blood, UA 05/17/2024 Negative     RBC, UA 05/17/2024 1-2     WBC, UA 05/17/2024 1-2     Epithelial Cells 05/17/2024 Occasional     Bacteria, UA 05/17/2024 Occasional     Hyaline Casts, UA 05/17/2024 0-3 (A)      MRI abdomen w wo contrast    Result Date: 5/29/2024  Narrative: MRI - ABDOMEN - WITH AND WITHOUT CONTRAST INDICATION: 68 years / Male. K74.60: Unspecified  "cirrhosis of liver R18.8: Other ascites K76.9: Liver disease, unspecified. COMPARISON: Numerous prior studies, the most recent is MRI abdomen 2/19/2024. TECHNIQUE: Multiplanar/multisequence MRI of the abdomen was performed before and after administration of contrast. IV Contrast: 10 mL of Gadobutrol injection (SINGLE-DOSE) FINDINGS: LOWER CHEST: Paraesophageal varices. LIVER: Normal in size and configuration. Observation: 1 Size: 5.0 x 3.1 cm #13/56. Location: Segment 7 Enhancement: Nonrim arterial phase hyperenhancement. Nonperipheral \"washout\": Yes Enhancing \"capsule\": No Threshold growth: Yes Ancillary features: *  Favoring HCC in particular: None *  Favoring malignancy: Mild T2 hyperintensity with restricted diffusion *  Favoring benignity: None LI-RADS Category: LR-5. Patent hepatic and portal veins. Observation: 2 Size: 2.0 x 1.8 cm #13/58. Location: Subcapsular location segment 2/4A Enhancement: Rim arterial phase hyperenhancement. Nonperipheral \"washout\": No Enhancing \"capsule\": Yes Threshold growth: Yes Ancillary features: *  Favoring HCC in particular: None *  Favoring malignancy: Mild T2 hyperintensity with restricted diffusion *  Favoring benignity: None LI-RADS Category: LR-4 Patent hepatic and portal veins. 19 mm segment 4B observation with non-rim arterial phase enhancement is unchanged since 10/23/2023 when remeasured with identical technique. LI LI- RADS LR-3. 20 mm segment 4A observation #13/38 is also unchanged when remeasured with similar technique. Reidentified non-rim arterial phase enhancement which is more subtle on today's study. LI- RADS LR-3. Similar 12 mm segment 6, #13/81, and segment 5 subcapsular 9 mm, #13/86, LI- RADS 3 observations with non-rim arterial phase enhancement. BILE DUCTS: No intrahepatic or extrahepatic bile duct dilation. GALLBLADDER: Cholelithiasis without findings of acute cholecystitis. PANCREAS: Unremarkable. ADRENAL GLANDS: Splenomegaly SPLEEN: With craniocaudal " length of 19.5 cm. KIDNEYS/PROXIMAL URETERS: No hydroureteronephrosis. No suspicious renal mass. Simple cysts BOWEL: No dilated loops of bowel. PERITONEUM/RETROPERITONEUM: No significant ascites. LYMPH NODES: Unchanged dominant 11 mm fernandez hepatic node #5/23. VESSELS: No aneurysm. ABDOMINAL WALL: Uncomplicated fat-containing paraumbilical hernia. BONES: No suspicious osseous lesion.     Impression: 5.0 cm segment 7 LI- RADS LR-5 observation. 2.0 cm segment 2/4A LI- RADS LR-4 observation. Unchanged segment 4A and segment 4B LI- RADS 3 observations. New segment 6 and segment 5 LI- RADS 3 observations The study was marked in EPIC for significant notification. Workstation performed: [id number]     XR toe second min 2 views LEFT    Result Date: 5/3/2024  Narrative: XR TOE LEFT SECOND MIN 2 VIEWS INDICATION: cellulitis. COMPARISON: Left toe radiographs 3/12/2024 FINDINGS: No acute fracture or dislocation. Hammertoe deformities. Severe first MTP osteoarthritis. No lytic or blastic osseous lesion. Unremarkable soft tissues.     Impression: No radiographic evidence of osteomyelitis. Workstation performed: [id number]     XR tibia fibula 2 views RIGHT    Result Date: 5/3/2024  Narrative: XR TIBIA FIBULA 2 VW RIGHT INDICATION: cellulitis. COMPARISON: None FINDINGS: No acute fracture or dislocation. Prior total knee arthroplasty. Small plantar calcaneal spur. No lytic or blastic osseous lesion. Diffuse soft tissue swelling. No soft tissue gas. Multifocal enthesopathy.     Impression: No acute osseous abnormality. Workstation performed: [id number]     XR tibia fibula 2 views LEFT    Result Date: 5/3/2024  Narrative: XR TIBIA FIBULA 2 VW LEFT INDICATION: cellulitis. COMPARISON: None FINDINGS: No acute fracture or dislocation. Degenerative changes in the knee. Small plantar calcaneal spur. No lytic or blastic osseous lesion. Diffuse soft tissue swelling. No soft tissue gas. Minimal lower pretibial soft tissue calcification.  Multifocal enthesopathy.     Impression: No acute osseous abnormality. Workstation performed: [id number]     XR chest 1 view portable    Result Date: 5/2/2024  Narrative: XR CHEST PORTABLE INDICATION: cough. COMPARISON: Chest CT 2/26/2024, CXR 2/18/2024. FINDINGS: Clear lungs. No pneumothorax or pleural effusion. Mild cardiomegaly. Bones are unremarkable for age. Left shoulder arthroplasty. High riding right humeral head suggestive of chronic rotator cuff tear. Normal upper abdomen.     Impression: No acute cardiopulmonary disease. Workstation performed: [id number]       RADIOLOGY RESULTS: I have personally reviewed pertinent imaging studies.      Leona Keith D.O.  Gastroenterology Fellow  [facility]  Division of Gastroenterology & Hepatology  Available of [employer]    ** Please Note: This note is constructed using a voice recognition dictation system. **

## 2024-05-30 NOTE — PATIENT INSTRUCTIONS
Scheduled date of EGD with banding (as of today): 6/6/2024  Physician performing EGD with banding:  Dr. VIRGILIO Quiroz  Location of EGD: [facility]  Desired bowel prep reviewed with patient: EGD prep instructions and Diabetic med instructions - no bowel- given to patient at office visit  Instructions reviewed with patient by: Sandi  Clearances:  N/A

## 2024-05-31 ENCOUNTER — DOCUMENTATION (OUTPATIENT)
Dept: HEMATOLOGY ONCOLOGY | Facility: CLINIC | Age: 69
End: 2024-05-31

## 2024-05-31 NOTE — PROGRESS NOTES
In-basket message received from Dr. Keith to add patient to the liver [facility] on 6/14/2024. Chart reviewed and prep completed.

## 2024-06-03 ENCOUNTER — TELEPHONE (OUTPATIENT)
Age: 69
End: 2024-06-03

## 2024-06-03 NOTE — TELEPHONE ENCOUNTER
"Patients GI provider:  Dr. Mcdaniel    Number to return call: [phone]    Reason for call: Patient calling as he was informed by  to contact office if he did not hear from interventional radiology to schedule an appointment. Per referral documentation \"Awaiting for pt to be discussed at upcoming liver tumor conference. Referral being deferred\".  Patient states that per Dr. Mcdaniel, this seemed urgent for him to be scheduled.     Scheduled procedure/appointment date if applicable: Procedure 6/6/24    "

## 2024-06-04 NOTE — TELEPHONE ENCOUNTER
Patients GI provider:       Number to return call: ( [phone]    Reason for call: Pt calling back again. Pt asking what he is to do at this moment. Pt states he was told he would get a call to make an appt. Then he was told that he needs to wait until meeting is over. Please advise.     Scheduled procedure/appointment date if applicable: Apt/procedure

## 2024-06-04 NOTE — TELEPHONE ENCOUNTER
Spoke with patient. He is aware I reviewed with Dr. Mcdaniel and ok to await 6/14/24 liver tumor board recommendation.   (IR  unable to schedule patient due to referral instructions to delay until 6/14 meeting recommendations).       Patient inquired about paracentesis. He reports distended abdomen with 7 lb weight gain over last two weeks (total of 10 lbs since February). Patient states vascular provider recently told him ascites is hindering vascular flow. LE edema noted/ wears compression stockings.  Denies SOB, change in mental status, fever, n/v. Taking bumetanide 1 mg daily. 5/17/24 BUN 19  creatinine 1.01.  5/30/24 office visit -- Euvolemic on exam today. Last paracentesis 2/21/24.

## 2024-06-04 NOTE — TELEPHONE ENCOUNTER
Patient is aware paracentesis order entered. IR scheduling number provider to patient via my chart message.

## 2024-06-05 ENCOUNTER — HOSPITAL ENCOUNTER (OUTPATIENT)
Dept: INTERVENTIONAL RADIOLOGY/VASCULAR | Facility: HOSPITAL | Age: 69
Discharge: HOME | End: 2024-06-05
Attending: INTERNAL MEDICINE
Payer: MEDICARE

## 2024-06-05 VITALS
RESPIRATION RATE: 18 BRPM | DIASTOLIC BLOOD PRESSURE: 66 MMHG | TEMPERATURE: 97 F | HEIGHT: 71 IN | WEIGHT: 235 LBS | OXYGEN SATURATION: 100 % | HEART RATE: 84 BPM | BODY MASS INDEX: 32.9 KG/M2 | SYSTOLIC BLOOD PRESSURE: 132 MMHG

## 2024-06-05 DIAGNOSIS — K70.31 ALCOHOLIC CIRRHOSIS OF LIVER WITH ASCITES (HCC): ICD-10-CM

## 2024-06-05 PROCEDURE — 76705 ECHO EXAM OF ABDOMEN: CPT | Performed by: INTERNAL MEDICINE

## 2024-06-05 NOTE — BRIEF OP NOTE (RAD/CATH)
IR PROCEDURE NOT PERFORMED Procedure Note    PATIENT NAME: Isael Avendano  : 1955  MRN: [id number]    Pre-op Diagnosis:   1. Alcoholic cirrhosis of liver with ascites (HCC)      Post-op Diagnosis:   1. Alcoholic cirrhosis of liver with ascites (HCC)        Surgeon:   AMOR Recinos  Assistants:     No qualified resident was available, Resident is only observing    Estimated Blood Loss: none  Findings: Ultrasound imaging performed on all 4 abdominal quadrants with no identifiable fluid to safely access and drain for paracentesis. Procedure not performed.      Specimens: none    Complications:  none immediate    Anesthesia: none    AMOR Recinos     Date: 2024  Time: 9:02 AM

## 2024-06-05 NOTE — DISCHARGE INSTRUCTIONS
Abdominal Paracentesis     WHAT YOU NEED TO KNOW:   Abdominal paracentesis is a procedure to remove abnormal fluid buildup in your abdomen. Fluid builds up because of liver problems, such as swelling and scarring. Heart failure, kidney disease, a mass, or problems with your pancreas may also cause fluid buildup.     DISCHARGE INSTRUCTIONS:     Follow up with your healthcare provider as directed: Write down your questions so you remember to ask them during your visits.     Wound care: Remove dressing after 24 hours. Leave glue in place.    Return to your normal activities    Contact Interventional Radiology at [phone] ([facility] PATIENTS: Contact Interventional Radiology at [phone]) ([facility] PATIENTS: Contact Interventional Radiology at [phone]) if:  You have a fever and your wound is red and swollen.   You have yellow, green, or bad-smelling discharge coming from your wound.   You have pain or swelling in your abdomen.   You have an upset stomach or you vomit.   You have sudden, sharp pain in your abdomen.   You urinate very little or not at all.   You feel confused and more tired than usual.   Your arm or leg feels warm, tender, and painful. It may look swollen and red.   You suddenly feel lightheaded and have trouble breathing.

## 2024-06-06 ENCOUNTER — ANESTHESIA EVENT (OUTPATIENT)
Dept: GASTROENTEROLOGY | Facility: HOSPITAL | Age: 69
End: 2024-06-06

## 2024-06-06 ENCOUNTER — HOSPITAL ENCOUNTER (OUTPATIENT)
Dept: GASTROENTEROLOGY | Facility: HOSPITAL | Age: 69
Setting detail: OUTPATIENT SURGERY
End: 2024-06-06
Payer: MEDICARE

## 2024-06-06 ENCOUNTER — ANESTHESIA (OUTPATIENT)
Dept: GASTROENTEROLOGY | Facility: HOSPITAL | Age: 69
End: 2024-06-06

## 2024-06-06 VITALS
HEIGHT: 71 IN | BODY MASS INDEX: 32.76 KG/M2 | SYSTOLIC BLOOD PRESSURE: 110 MMHG | WEIGHT: 234 LBS | DIASTOLIC BLOOD PRESSURE: 59 MMHG | OXYGEN SATURATION: 98 % | HEART RATE: 79 BPM | RESPIRATION RATE: 18 BRPM | TEMPERATURE: 97 F

## 2024-06-06 DIAGNOSIS — K31.819 GAVE (GASTRIC ANTRAL VASCULAR ECTASIA): ICD-10-CM

## 2024-06-06 DIAGNOSIS — I85.10 SECONDARY ESOPHAGEAL VARICES WITHOUT BLEEDING (HCC): ICD-10-CM

## 2024-06-06 PROCEDURE — 88342 IMHCHEM/IMCYTCHM 1ST ANTB: CPT | Performed by: SPECIALIST

## 2024-06-06 PROCEDURE — 43239 EGD BIOPSY SINGLE/MULTIPLE: CPT | Performed by: INTERNAL MEDICINE

## 2024-06-06 PROCEDURE — 88313 SPECIAL STAINS GROUP 2: CPT | Performed by: SPECIALIST

## 2024-06-06 PROCEDURE — 88341 IMHCHEM/IMCYTCHM EA ADD ANTB: CPT | Performed by: SPECIALIST

## 2024-06-06 PROCEDURE — 43244 EGD VARICES LIGATION: CPT | Performed by: INTERNAL MEDICINE

## 2024-06-06 PROCEDURE — 88305 TISSUE EXAM BY PATHOLOGIST: CPT | Performed by: SPECIALIST

## 2024-06-06 RX ORDER — SODIUM CHLORIDE, SODIUM LACTATE, POTASSIUM CHLORIDE, CALCIUM CHLORIDE 600; 310; 30; 20 MG/100ML; MG/100ML; MG/100ML; MG/100ML
INJECTION, SOLUTION INTRAVENOUS CONTINUOUS PRN
Status: DISCONTINUED | OUTPATIENT
Start: 2024-06-06 | End: 2024-06-06

## 2024-06-06 RX ORDER — PROPOFOL 10 MG/ML
INJECTION, EMULSION INTRAVENOUS AS NEEDED
Status: DISCONTINUED | OUTPATIENT
Start: 2024-06-06 | End: 2024-06-06

## 2024-06-06 RX ORDER — LIDOCAINE HYDROCHLORIDE 10 MG/ML
INJECTION, SOLUTION EPIDURAL; INFILTRATION; INTRACAUDAL; PERINEURAL AS NEEDED
Status: DISCONTINUED | OUTPATIENT
Start: 2024-06-06 | End: 2024-06-06

## 2024-06-06 RX ADMIN — PROPOFOL 100 MG: 10 INJECTION, EMULSION INTRAVENOUS at 09:57

## 2024-06-06 RX ADMIN — PROPOFOL 50 MG: 10 INJECTION, EMULSION INTRAVENOUS at 10:01

## 2024-06-06 RX ADMIN — PROPOFOL 50 MG: 10 INJECTION, EMULSION INTRAVENOUS at 10:04

## 2024-06-06 RX ADMIN — SODIUM CHLORIDE, SODIUM LACTATE, POTASSIUM CHLORIDE, AND CALCIUM CHLORIDE: .6; .31; .03; .02 INJECTION, SOLUTION INTRAVENOUS at 09:54

## 2024-06-06 RX ADMIN — PROPOFOL 50 MG: 10 INJECTION, EMULSION INTRAVENOUS at 10:07

## 2024-06-06 RX ADMIN — LIDOCAINE HYDROCHLORIDE 50 MG: 10 INJECTION, SOLUTION EPIDURAL; INFILTRATION; INTRACAUDAL; PERINEURAL at 09:57

## 2024-06-06 NOTE — INTERVAL H&P NOTE
H&P reviewed. After examining the patient I find no changes in the patients condition since the H&P had been written.    Vitals:    06/06/24 0914   BP: 128/61   Pulse: 87   Resp: 18   Temp: (!) 97.1 °F (36.2 °C)   SpO2: 99%     
Stable

## 2024-06-06 NOTE — ANESTHESIA POSTPROCEDURE EVALUATION
Post-Op Assessment Note    CV Status:  Stable  Pain Score: 0    Pain management: adequate       Mental Status:  Arousable and sleepy   PONV Controlled:  None   Airway Patency:  Patent     Post Op Vitals Reviewed: Yes    No anethesia notable event occurred.    Staff: CRNA               BP  100/56   Temp      Pulse  92   Resp   16   SpO2   96

## 2024-06-06 NOTE — ANESTHESIA PREPROCEDURE EVALUATION
Procedure:  EGD    Relevant Problems   CARDIO   (+) Essential hypertension   (+) Murmur      ENDO   (+) DM2 (diabetes mellitus, type 2) (HCC)      GI/HEPATIC   (+) Cirrhosis, alcoholic (HCC)      /RENAL   (+) ABILIO (acute kidney injury) (HCC)   (+) Stage 3a chronic kidney disease (HCC)      HEMATOLOGY   (+) Iron deficiency anemia due to chronic blood loss   (+) Pancytopenia (HCC)   (+) Symptomatic anemia      MUSCULOSKELETAL   (+) Post-traumatic osteoarthritis of left shoulder   (+) Pyogenic arthritis of left shoulder region (HCC)      PULMONARY   (+) MEG (obstructive sleep apnea)      Other   (+) Splenomegaly, congestive, chronic        Physical Exam    Airway    Mallampati score: II  TM Distance: >3 FB  Neck ROM: full     Dental        Cardiovascular      Pulmonary      Other Findings        Anesthesia Plan  ASA Score- 3     Anesthesia Type- IV sedation with anesthesia with ASA Monitors.         Additional Monitors:     Airway Plan:            Plan Factors-Exercise tolerance (METS): >4 METS.    Chart reviewed.    Patient summary reviewed.    Patient is not a current smoker.              Induction- intravenous.    Postoperative Plan-         Informed Consent- Anesthetic plan and risks discussed with patient.  I personally reviewed this patient with the CRNA. Discussed and agreed on the Anesthesia Plan with the CRNA..

## 2024-06-10 PROCEDURE — 88305 TISSUE EXAM BY PATHOLOGIST: CPT | Performed by: SPECIALIST

## 2024-06-10 PROCEDURE — 88342 IMHCHEM/IMCYTCHM 1ST ANTB: CPT | Performed by: SPECIALIST

## 2024-06-10 PROCEDURE — 88341 IMHCHEM/IMCYTCHM EA ADD ANTB: CPT | Performed by: SPECIALIST

## 2024-06-10 PROCEDURE — 88313 SPECIAL STAINS GROUP 2: CPT | Performed by: SPECIALIST

## 2024-06-14 ENCOUNTER — DOCUMENTATION (OUTPATIENT)
Dept: HEMATOLOGY ONCOLOGY | Facility: CLINIC | Age: 69
End: 2024-06-14

## 2024-06-14 ENCOUNTER — PREP FOR PROCEDURE (OUTPATIENT)
Dept: INTERVENTIONAL RADIOLOGY/VASCULAR | Facility: CLINIC | Age: 69
End: 2024-06-14

## 2024-06-14 ENCOUNTER — PREP FOR PROCEDURE (OUTPATIENT)
Dept: GASTROENTEROLOGY | Facility: AMBULARY SURGERY CENTER | Age: 69
End: 2024-06-14

## 2024-06-14 DIAGNOSIS — K29.70 GASTRITIS WITHOUT BLEEDING, UNSPECIFIED CHRONICITY, UNSPECIFIED GASTRITIS TYPE: Primary | ICD-10-CM

## 2024-06-14 DIAGNOSIS — K27.9 PUD (PEPTIC ULCER DISEASE): ICD-10-CM

## 2024-06-14 DIAGNOSIS — K70.31 ALCOHOLIC CIRRHOSIS OF LIVER WITH ASCITES (HCC): Primary | ICD-10-CM

## 2024-06-14 RX ORDER — PANTOPRAZOLE SODIUM 40 MG/1
40 TABLET, DELAYED RELEASE ORAL DAILY
Qty: 90 TABLET | Refills: 1 | Status: SHIPPED | OUTPATIENT
Start: 2024-06-14 | End: 2024-12-11

## 2024-06-14 RX ORDER — SODIUM CHLORIDE 9 MG/ML
30 INJECTION, SOLUTION INTRAVENOUS CONTINUOUS
OUTPATIENT
Start: 2024-06-14

## 2024-06-14 NOTE — PROGRESS NOTES
LIVER MULTIDISCIPLINARY CANCER CONFERENCE    DATE: 6/14/2024      PRESENTING DOCTOR: Leona Keith      DIAGNOSIS: LIRADS 5 liver lesion      Isael Avendano was presented at the Liver Multidisciplinary Cancer Conference today.      PHYSICIAN RECOMMENDED PLAN:    -Attempt to retrieve liver biopsy at the dome if LIRADS 5 criteria not met.   -Presumed liver directed therapy following the results to the liver biopsy.    Team agreed to plan.    The final treatment plan will be left to the discretion of the patient and the treating physician.     DISCLAIMERS:  TO THE TREATING PHYSICIAN:  This conference is a meeting of clinicians from various specialty areas who evaluate and discuss patients for whom a multidisciplinary treatment approach is being considered. Please note that the above opinion was a consensus of the conference attendees and is intended only to assist in quality care of the discussed patient.  The responsibility for follow up on the input given during the conference, along with any final decisions regarding plan of care, is that of the patient and the patient's provider. Accordingly, appointments have only been recommended based on this information and have NOT been scheduled unless otherwise noted.      TO THE PATIENT:  This summary is a brief record of major aspects of your cancer treatment. You may choose to share a copy with any of your doctors or nurses. However, this is not a detailed or comprehensive record of your care.      NCCN guidelines were readily available for review at this discussion

## 2024-06-17 ENCOUNTER — TELEPHONE (OUTPATIENT)
Age: 69
End: 2024-06-17

## 2024-06-17 ENCOUNTER — TELEPHONE (OUTPATIENT)
Dept: HEMATOLOGY ONCOLOGY | Facility: CLINIC | Age: 69
End: 2024-06-17

## 2024-06-18 ENCOUNTER — APPOINTMENT (OUTPATIENT)
Dept: LAB | Facility: CLINIC | Age: 69
End: 2024-06-18
Payer: MEDICARE

## 2024-06-18 DIAGNOSIS — D61.818 PANCYTOPENIA (HCC): ICD-10-CM

## 2024-06-18 DIAGNOSIS — E61.1 IRON DEFICIENCY: ICD-10-CM

## 2024-06-18 LAB
ALBUMIN SERPL BCP-MCNC: 3.1 G/DL (ref 3.5–5)
ALP SERPL-CCNC: 179 U/L (ref 34–104)
ALT SERPL W P-5'-P-CCNC: 42 U/L (ref 7–52)
ANION GAP SERPL CALCULATED.3IONS-SCNC: 9 MMOL/L (ref 4–13)
AST SERPL W P-5'-P-CCNC: 55 U/L (ref 13–39)
BASOPHILS # BLD AUTO: 0.02 THOUSANDS/ÂΜL (ref 0–0.1)
BASOPHILS NFR BLD AUTO: 1 % (ref 0–1)
BILIRUB SERPL-MCNC: 1.86 MG/DL (ref 0.2–1)
BUN SERPL-MCNC: 22 MG/DL (ref 5–25)
CALCIUM ALBUM COR SERPL-MCNC: 9.7 MG/DL (ref 8.3–10.1)
CALCIUM SERPL-MCNC: 9 MG/DL (ref 8.4–10.2)
CHLORIDE SERPL-SCNC: 101 MMOL/L (ref 96–108)
CO2 SERPL-SCNC: 25 MMOL/L (ref 21–32)
CREAT SERPL-MCNC: 1.06 MG/DL (ref 0.6–1.3)
EOSINOPHIL # BLD AUTO: 0.21 THOUSAND/ÂΜL (ref 0–0.61)
EOSINOPHIL NFR BLD AUTO: 10 % (ref 0–6)
ERYTHROCYTE [DISTWIDTH] IN BLOOD BY AUTOMATED COUNT: 16.3 % (ref 11.6–15.1)
FERRITIN SERPL-MCNC: 15 NG/ML (ref 24–336)
GFR SERPL CREATININE-BSD FRML MDRD: 70 ML/MIN/1.73SQ M
GLUCOSE SERPL-MCNC: 319 MG/DL (ref 65–140)
HCT VFR BLD AUTO: 30.6 % (ref 36.5–49.3)
HGB BLD-MCNC: 9.3 G/DL (ref 12–17)
IMM GRANULOCYTES # BLD AUTO: 0 THOUSAND/UL (ref 0–0.2)
IMM GRANULOCYTES NFR BLD AUTO: 0 % (ref 0–2)
IRON SATN MFR SERPL: 11 % (ref 15–50)
IRON SERPL-MCNC: 41 UG/DL (ref 50–212)
LYMPHOCYTES # BLD AUTO: 0.37 THOUSANDS/ÂΜL (ref 0.6–4.47)
LYMPHOCYTES NFR BLD AUTO: 17 % (ref 14–44)
MCH RBC QN AUTO: 25.3 PG (ref 26.8–34.3)
MCHC RBC AUTO-ENTMCNC: 30.4 G/DL (ref 31.4–37.4)
MCV RBC AUTO: 83 FL (ref 82–98)
MONOCYTES # BLD AUTO: 0.17 THOUSAND/ÂΜL (ref 0.17–1.22)
MONOCYTES NFR BLD AUTO: 8 % (ref 4–12)
NEUTROPHILS # BLD AUTO: 1.42 THOUSANDS/ÂΜL (ref 1.85–7.62)
NEUTS SEG NFR BLD AUTO: 64 % (ref 43–75)
NRBC BLD AUTO-RTO: 0 /100 WBCS
PLATELET # BLD AUTO: 63 THOUSANDS/UL (ref 149–390)
POTASSIUM SERPL-SCNC: 4.8 MMOL/L (ref 3.5–5.3)
PROT SERPL-MCNC: 7.4 G/DL (ref 6.4–8.4)
RBC # BLD AUTO: 3.68 MILLION/UL (ref 3.88–5.62)
SODIUM SERPL-SCNC: 135 MMOL/L (ref 135–147)
TIBC SERPL-MCNC: 383 UG/DL (ref 250–450)
UIBC SERPL-MCNC: 342 UG/DL (ref 155–355)
WBC # BLD AUTO: 2.19 THOUSAND/UL (ref 4.31–10.16)

## 2024-06-18 PROCEDURE — 83550 IRON BINDING TEST: CPT

## 2024-06-18 PROCEDURE — 83540 ASSAY OF IRON: CPT

## 2024-06-18 PROCEDURE — 82728 ASSAY OF FERRITIN: CPT

## 2024-06-18 PROCEDURE — 80053 COMPREHEN METABOLIC PANEL: CPT

## 2024-06-18 PROCEDURE — 85025 COMPLETE CBC W/AUTO DIFF WBC: CPT

## 2024-06-18 PROCEDURE — 36415 COLL VENOUS BLD VENIPUNCTURE: CPT

## 2024-06-19 PROBLEM — N17.9 AKI (ACUTE KIDNEY INJURY) (HCC): Status: RESOLVED | Noted: 2020-12-25 | Resolved: 2024-06-19

## 2024-06-19 PROBLEM — E66.01 OBESITY, MORBID (HCC): Status: RESOLVED | Noted: 2022-12-08 | Resolved: 2024-06-19

## 2024-06-19 PROBLEM — E66.01 MORBID OBESITY (HCC): Status: RESOLVED | Noted: 2022-12-08 | Resolved: 2024-06-19

## 2024-06-20 ENCOUNTER — OFFICE VISIT (OUTPATIENT)
Dept: HEMATOLOGY ONCOLOGY | Facility: CLINIC | Age: 69
End: 2024-06-20
Payer: MEDICARE

## 2024-06-20 VITALS
RESPIRATION RATE: 17 BRPM | HEIGHT: 71 IN | HEART RATE: 92 BPM | WEIGHT: 234.4 LBS | OXYGEN SATURATION: 98 % | DIASTOLIC BLOOD PRESSURE: 62 MMHG | TEMPERATURE: 98 F | BODY MASS INDEX: 32.81 KG/M2 | SYSTOLIC BLOOD PRESSURE: 118 MMHG

## 2024-06-20 DIAGNOSIS — T81.49XA POSTOPERATIVE CELLULITIS OF SURGICAL WOUND: ICD-10-CM

## 2024-06-20 DIAGNOSIS — C22.0 HEPATOCELLULAR CARCINOMA (HCC): ICD-10-CM

## 2024-06-20 DIAGNOSIS — D73.2 SPLENOMEGALY, CONGESTIVE, CHRONIC: ICD-10-CM

## 2024-06-20 DIAGNOSIS — D50.0 IRON DEFICIENCY ANEMIA DUE TO CHRONIC BLOOD LOSS: Primary | ICD-10-CM

## 2024-06-20 PROCEDURE — G2211 COMPLEX E/M VISIT ADD ON: HCPCS | Performed by: INTERNAL MEDICINE

## 2024-06-20 PROCEDURE — 99215 OFFICE O/P EST HI 40 MIN: CPT | Performed by: INTERNAL MEDICINE

## 2024-06-20 RX ORDER — SODIUM CHLORIDE 9 MG/ML
20 INJECTION, SOLUTION INTRAVENOUS ONCE
OUTPATIENT
Start: 2024-06-26

## 2024-06-20 NOTE — PROGRESS NOTES
[facility] [place]  [address]  [facility] [address]  [phone]    Isael Avendano,1955, [id number]  06/20/24    Discussion:   In summary, this is a 68-year-old male with a history of pancytopenia on and off for several years likely due to hypersplenism.  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  He had had hepatic lesions of uncertain significance.    MRI earlier this month demonstrated LR 5 lesion in segment 7.  No change in LR 4 lesion in segment 4.  Cirrhosis.  Recent WBC 2.1, hemoglobin 9.3, platelet count 63.  Differential near normal.  CMP shows AST 55, alk phos 179, total protein 7.4, albumin 3.1, bilirubin 1.8, otherwise normal.  Ferritin 15 iron saturation 11, consistent with iron deficiency.  We will make arrangements for Feraheme 510 mg IV x 1 dose.  He has an appointment at [facility] regarding HCC.  Tumor board had suggested consideration for biopsy if the larger lesions were LR 4 or less.  Given LR 5 designation, biopsy does not seem indicated.  I would anticipate pivot to local therapies, cryoablation, RFA, etc.  I discussed the above with the patient.  The patient and his wife voiced understanding and agreement.  ______________________________________________________________________    Chief Complaint   Patient presents with    Follow-up       HPI:  Oncology History   Hepatocellular carcinoma (HCC)   5/20/2024 -  Cancer Staged    Staging form: Liver (Excluding Intrahepatic Bile Ducts), AJCC 8th Edition  - Clinical stage from 5/20/2024: cT2 - Signed by Charlie Martins DO on 6/20/2024  Stage prefix: Initial diagnosis       6/20/2024 Initial Diagnosis    Hepatocellular carcinoma (HCC)         Interval History: Clinically stable.  ECOG-  1 - Symptomatic but completely ambulatory    Review of Systems   Constitutional:  Positive for fatigue. Negative for chills and fever.   HENT:  Negative for  nosebleeds.    Eyes:  Negative for discharge.   Respiratory:  Negative for cough and shortness of breath.    Cardiovascular:  Negative for chest pain.   Gastrointestinal:  Negative for abdominal pain, constipation and diarrhea.   Endocrine: Negative for polydipsia.   Genitourinary:  Negative for hematuria.   Musculoskeletal:  Negative for arthralgias.   Skin:  Negative for color change.   Allergic/Immunologic: Negative for immunocompromised state.   Neurological:  Negative for dizziness and headaches.   Hematological:  Negative for adenopathy.   Psychiatric/Behavioral:  Negative for agitation.        Past Medical History:   Diagnosis Date    ABILIO (acute kidney injury) ([facility]) 12/25/2020    Arrhythmia     Chronic kidney disease     COVID 12/2020    CPAP (continuous positive airway pressure) dependence     Diabetes mellitus (HCC)     History of echocardiogram 11/14/2017    showed EF of 50-55 percentWith moderate LVH and left ventricle diastolic dysfunction. Left atrium was moderately enlarged. Trace MR noted.     History of Holter monitoring 11/21/2017    showed baseline rhythm of sinus origin with an average heart it of 61 bpm. The lowest heart rate was 49 and the highest heart rate was 10 8 bpm. There were rare single VPCs, and frequent PACs representing 3.2% of total beats. There were several episodes of sinus arrhythmias with sinus bradycardia and heart rate ranging from 40-90 bpm. No sustained dysrhythmias, or pauses noted. The patient did not    History of transfusion 04/2023    platelets    Hypertension     Liver disease     cirhosis    Morbid obesity (HCC) 12/08/2022    Murmur, cardiac     Obese     Obesity, morbid (HCC) 12/08/2022    Sleep apnea      Patient Active Problem List   Diagnosis    Alcohol abuse    DM2 (diabetes mellitus, type 2) (HCC)    Essential hypertension    Cholelithiasis    Cirrhosis, alcoholic (HCC)    MEG (obstructive sleep apnea)    Pancytopenia ([facility])    Insomnia    Elevated troponin     Chest pressure    Diabetic ulcer of left foot associated with type 2 diabetes mellitus (HCC)    Cellulitis    Post-traumatic osteoarthritis of left shoulder    S/P reverse total shoulder arthroplasty, left    Aftercare following left shoulder joint replacement surgery    Ulcer of left foot, limited to breakdown of skin (HCC)    Postoperative infection of surgical wound    Stage 3a chronic kidney disease (HCC)    Murmur    Urinary retention    Pyogenic arthritis of left shoulder region (HCC)    Perineal abscess    Symptomatic anemia    Volume overload    Chest wall mass    Iron deficiency anemia due to chronic blood loss    Splenomegaly, congestive, chronic    Hepatocellular carcinoma (HCC)       Current Outpatient Medications:     bumetanide (BUMEX) 1 mg tablet, Take 1 tablet (1 mg total) by mouth daily Do not start before February 29, 2024., Disp: 30 tablet, Rfl: 0    glimepiride (AMARYL) 4 mg tablet, Take 4 mg by mouth 2 (two) times a day, Disp: , Rfl:     insulin glargine (LANTUS) 100 units/mL subcutaneous injection, Inject 40 Units under the skin daily at bedtime, Disp: , Rfl:     Insulin Pen Needle (BD Pen Needle Nayla 2nd Gen) 32G X 4 MM MISC, For use with insulin pen. Pharmacy may dispense brand covered by insurance., Disp: 100 each, Rfl: 0    Multiple Vitamin (multivitamin) capsule, Take 1 capsule by mouth daily, Disp: 21 capsule, Rfl: 0    pantoprazole (PROTONIX) 40 mg tablet, Take 1 tablet (40 mg total) by mouth daily, Disp: 90 tablet, Rfl: 1    potassium chloride (Klor-Con M20) 20 mEq tablet, Take 1 tablet (20 mEq total) by mouth daily Do not start before February 29, 2024., Disp: 30 tablet, Rfl: 0    tamsulosin (FLOMAX) 0.4 mg, Take 1 capsule (0.4 mg total) by mouth daily with dinner, Disp: 30 capsule, Rfl: 0    traZODone (DESYREL) 50 mg tablet, Take 50 mg by mouth daily at bedtime bedtime, Disp: , Rfl:     fluticasone (FLONASE) 50 mcg/act nasal spray, 1 spray into each nostril daily (Patient not taking:  "Reported on 5/30/2024), Disp: 16 g, Rfl: 0  Allergies   Allergen Reactions    Sulfa Antibiotics Hives    Daptomycin Other (See Comments)     Possibly contributed to ABILIO on 6/2023 admission      Past Surgical History:   Procedure Laterality Date    CATARACT EXTRACTION      EYE SURGERY Left 1997    FL GUIDED NEEDLE PLAC BX/ASP/INJ  8/28/2023    HERNIA REPAIR  [phone]    IR PARACENTESIS  2/19/2024    IR PARACENTESIS  2/21/2024    JOINT REPLACEMENT Right     TKR    KNEE ARTHROPLASTY Right 2008    HI ARTHROPLASTY GLENOHUMERAL JOINT TOTAL SHOULDER Left 04/04/2023    Procedure: ARTHROPLASTY SHOULDER REVERSE;  Surgeon: Marcelo Lester MD;  Location: [facility];  Service: Orthopedics    HI JARED SHOULDER ARTHRPLSTY HUMERAL&GLENOID COMPNT Left 9/8/2023    Procedure: removal of antibiotic spacer, incision and debridement,  with revision reverse shoulder arthroplasty;  Surgeon: Lita Aguilar;  Location: [facility];  Service: Orthopedics    HI JARED SHOULDER ARTHRPLSTY HUMERAL/GLENOID COMPNT Left 06/13/2023    Procedure: ARTHROPLASTY SHOULDER REVISION;  Surgeon: Lita Aguilar;  Location: [facility];  Service: Orthopedics    SHOULDER SURGERY Right 2002    WOUND DEBRIDEMENT Left 05/02/2023    Procedure: INCISION AND DRAINAGE (I&D) EXTREMITY, vac placement;  Surgeon: Marcelo Lester MD;  Location: [facility];  Service: Orthopedics     Social History     Objective:  Vitals:    06/20/24 0936   BP: 118/62   BP Location: Left arm   Patient Position: Sitting   Cuff Size: Adult   Pulse: 92   Resp: 17   Temp: 98 °F (36.7 °C)   SpO2: 98%   Weight: 106 kg (234 lb 6.4 oz)   Height: 5' 11\" (1.803 m)     Physical Exam  Constitutional:       Appearance: He is well-developed.   HENT:      Head: Normocephalic and atraumatic.      Mouth/Throat:      Mouth: Mucous membranes are moist.   Eyes:      Pupils: Pupils are equal, round, and reactive to light.   Cardiovascular:      Rate and Rhythm: Normal rate and regular rhythm.      " Heart sounds: No murmur heard.  Pulmonary:      Breath sounds: Normal breath sounds. No wheezing or rales.   Abdominal:      Palpations: Abdomen is soft.      Tenderness: There is no abdominal tenderness.   Musculoskeletal:         General: No tenderness. Normal range of motion.      Cervical back: Neck supple.   Lymphadenopathy:      Cervical: No cervical adenopathy.   Skin:     Findings: No erythema or rash.   Neurological:      Mental Status: He is alert and oriented to person, place, and time.      Cranial Nerves: No cranial nerve deficit.      Deep Tendon Reflexes: Reflexes are normal and symmetric.   Psychiatric:         Behavior: Behavior normal.           Labs:  I personally reviewed the labs and imaging pertinent to this patient care.

## 2024-06-21 ENCOUNTER — TELEPHONE (OUTPATIENT)
Dept: GASTROENTEROLOGY | Facility: CLINIC | Age: 69
End: 2024-06-21

## 2024-06-21 PROBLEM — D69.6 THROMBOCYTOPENIA (HCC): Status: ACTIVE | Noted: 2024-06-21

## 2024-06-21 RX ORDER — SODIUM CHLORIDE 9 MG/ML
20 INJECTION, SOLUTION INTRAVENOUS ONCE
OUTPATIENT
Start: 2024-06-28

## 2024-06-21 NOTE — TELEPHONE ENCOUNTER
----- Message from Titi SHEIKH sent at 6/21/2024  1:20 PM EDT -----  Regarding: FW: biopsy    ----- Message -----  From: Korey Mcdaniel MD  Sent: 6/21/2024  12:35 PM EDT  To: Gastroenterology [facility]  Subject: FW: biopsy                                       Can we make sure he has labs 2 days prior and platelet infusion set up incase he needs it?    Thanks    V  ----- Message -----  From: Sondra Carter  Sent: 6/21/2024  10:04 AM EDT  To: Cady Medrano MD; Rae Rivera; Leona Keith DO; #  Subject: RE: biopsy                                       Good morning,    Isael has been scheduled on 6/28 for his bx.    Thank you,  Sondra  ----- Message -----  From: Korey Mcdaniel MD  Sent: 6/17/2024   9:08 AM EDT  To: Cady Medrano MD; Rae Rivera; Leona Keith DO; #  Subject: RE: biopsy                                       Perfect.  IR schedule, please let us know date patient is schedule and I'll order labs accordingly...    V  ----- Message -----  From: Cady Medrano MD  Sent: 6/17/2024   9:03 AM EDT  To: Rae Rivera; Leona Keith DO; #  Subject: RE: biopsy                                         I think that is very reasonable thank you!     I sent a separate message to my IR team  - I think he should be scheduled at a campus that has blood products available either way.    Thanks  ----- Message -----  From: Korey Mcdaniel MD  Sent: 6/17/2024   8:47 AM EDT  To: Cady Medrano MD; Rae Rivera; Leona Keith DO; #  Subject: RE: biopsy                                       We can get blood work done a few days before and recheck.  If it is over 60,000, leave it be, and if less, we can arrange for a pool of platelets prior?    If you are ok with this plan, let me know.    V  ----- Message -----  From: Cady Medrano MD  Sent: 6/14/2024   7:28 AM EDT  To: Rae Rivera; Leona Keith DO; #  Subject: biopsy                                           IR team  I ordered biopsy at recs of tumor board    GI team -  should we consider platelet transfusion? Cutoff is 50, he seems to hover around 70     Scheduling team - please schedule at hospital with blood/platelets   Doc must approve

## 2024-06-21 NOTE — TELEPHONE ENCOUNTER
Spoke with Chiquis at [facility] infusion center. Relayed pt needing plt tx if plt <60,000 morning of 6/28/24 prior to liver biopsy that is scheduled for 9:30 AM. Pt is scheduled for infusion chair 6/28/24 at 0730 at [facility].     Dr Mcdaniel- please place order for type and screen, pre surgery blood bank specimen and prepare platelet transfusion order. This is for platelets to be prepared if needed 6/28/24 (date of procedure)     If pt does not need infusion prior to procedure, infusion center will be called to cancel appt.

## 2024-06-21 NOTE — TELEPHONE ENCOUNTER
Spoke with pt, reviewed need for bloodwork to be completed on 6/26 to prepare for procedure on 6/28. Pt advised he would be called on 6/27 with results. Pt aware of infusion chair reservation at [facility] for 0730 if plt is <60,000. Pt agreeable and verbalized understanding of all information

## 2024-06-22 NOTE — PRE-PROCEDURE INSTRUCTIONS
Pre-procedure Instructions for Interventional Radiology  [address] [address]  INTERVENTIONAL RADIOLOGY [phone]    You are scheduled for a/an liver biopsy.    On Friday 6-28-24.    Your tentative arrival time is 8:30am.  [facility] will notify you the day before your procedure with the exact arrival time and the location to arrive.    To prepare for your procedure:  Please arrange for someone to drive you home after the procedure and stay with you until the next morning if you are instructed to do so.  This is typically for patients receiving some type of sedative or anesthetic for the procedure.  DO NOT EAT OR DRINK ANYTHING after midnight on the evening before your procedure including candy & gum.  ONLY SIPS OF WATER with your medications are allowed on the morning of your procedure.  TAKE ALL OF YOUR REGULAR MEDICATIONS THE MORNING OF YOUR PROCEDURE with sips of water!  We may call you to stop some of your blood sugar, blood pressure and blood thinning medications depending on the procedure.  Please take all of these medications unless we instruct you to stop them.  If you have an allergy to x-ray dye, please contact Interventional Radiology for an x-ray dye preparation which usually consists of an oral steroid and Benadryl.    The day of your procedure:  Bring a list of the medications you take at home.  Bring medications you take for breathing problems (such as inhalers), medications for chest pain, or both.  Bring a case for your glasses or contacts.  Bring your insurance card and a form of photo ID.  Please leave all valuables such as credit cards and jewelry at home.  Report to the admitting office to the left of the registration desk in the main lobby at the [facility], Entrance B.  You will then be directed to the Short Stay Center.  While your procedure is being performed, your family may wait in the Radiology Waiting Room on the 1st floor in  Radiology.  if they need to leave, they may provide a number to be called following the procedure.   Be prepared to stay overnight just in case. Sometimes procedures will indicate the need for further observation or treatment.   If you are scheduled for a follow-up visit with the Interventional Radiologist after your procedure, you will be called with a date and time.    Special Instructions (Medications to stop taking before your procedure etc.):  Hold Glimepiride and Bumex the morning of the procedure.  Have bloodwork completed on 6-26-24.  Patient to be contacted on 6-27-24 to instruct if he needs to report to the \[facility]\"" Infusion Center the morning of the procedure for platelet infusion.

## 2024-06-25 ENCOUNTER — TELEPHONE (OUTPATIENT)
Dept: INFUSION CENTER | Facility: HOSPITAL | Age: 69
End: 2024-06-25

## 2024-06-25 NOTE — TELEPHONE ENCOUNTER
I confirmed with Margot MCNALLY via Dr Martins that pt does not need infusion prior to surgery on 6/28. I spoke with pt directly and scheduled infusion on 7/10 at 1330 pm at [facility]. I also confirmed that pt is scheduled for transfusion on 6/28 at \[facility]\"" infusion center. Pt verbalizes understanding.

## 2024-06-25 NOTE — TELEPHONE ENCOUNTER
Spoke with pt, reminded to complete bloodwork tomorrow 6/26 in preparation for liver biopsy scheduled with Dr cha on 6/28 at [facility]. Infusion chair reserved for plt <60 as per Dr CHA.  PT agreeable and verbalized understanding. All questions and concerns addressed

## 2024-06-26 ENCOUNTER — APPOINTMENT (OUTPATIENT)
Dept: LAB | Facility: CLINIC | Age: 69
End: 2024-06-26
Payer: MEDICARE

## 2024-06-26 ENCOUNTER — TELEPHONE (OUTPATIENT)
Dept: INFUSION CENTER | Facility: HOSPITAL | Age: 69
End: 2024-06-26

## 2024-06-26 DIAGNOSIS — E61.1 IRON DEFICIENCY: ICD-10-CM

## 2024-06-26 DIAGNOSIS — C22.0 HEPATOCELLULAR CARCINOMA (HCC): ICD-10-CM

## 2024-06-26 DIAGNOSIS — D69.6 THROMBOCYTOPENIA (HCC): ICD-10-CM

## 2024-06-26 DIAGNOSIS — K70.30 ALCOHOLIC CIRRHOSIS OF LIVER WITHOUT ASCITES (HCC): ICD-10-CM

## 2024-06-26 DIAGNOSIS — D61.818 PANCYTOPENIA (HCC): ICD-10-CM

## 2024-06-26 LAB
ALBUMIN SERPL BCG-MCNC: 3 G/DL (ref 3.5–5)
ALP SERPL-CCNC: 178 U/L (ref 34–104)
ALT SERPL W P-5'-P-CCNC: 37 U/L (ref 7–52)
ANION GAP SERPL CALCULATED.3IONS-SCNC: 9 MMOL/L (ref 4–13)
AST SERPL W P-5'-P-CCNC: 47 U/L (ref 13–39)
BASOPHILS # BLD AUTO: 0.02 THOUSANDS/ÂΜL (ref 0–0.1)
BASOPHILS NFR BLD AUTO: 1 % (ref 0–1)
BILIRUB SERPL-MCNC: 1.74 MG/DL (ref 0.2–1)
BUN SERPL-MCNC: 18 MG/DL (ref 5–25)
CALCIUM ALBUM COR SERPL-MCNC: 10.1 MG/DL (ref 8.3–10.1)
CALCIUM SERPL-MCNC: 9.3 MG/DL (ref 8.4–10.2)
CHLORIDE SERPL-SCNC: 104 MMOL/L (ref 96–108)
CO2 SERPL-SCNC: 25 MMOL/L (ref 21–32)
CREAT SERPL-MCNC: 0.96 MG/DL (ref 0.6–1.3)
EOSINOPHIL # BLD AUTO: 0.24 THOUSAND/ÂΜL (ref 0–0.61)
EOSINOPHIL NFR BLD AUTO: 9 % (ref 0–6)
ERYTHROCYTE [DISTWIDTH] IN BLOOD BY AUTOMATED COUNT: 17.2 % (ref 11.6–15.1)
GFR SERPL CREATININE-BSD FRML MDRD: 79 ML/MIN/1.73SQ M
GLUCOSE SERPL-MCNC: 183 MG/DL (ref 65–140)
HCT VFR BLD AUTO: 30 % (ref 36.5–49.3)
HGB BLD-MCNC: 9.2 G/DL (ref 12–17)
IMM GRANULOCYTES # BLD AUTO: 0.01 THOUSAND/UL (ref 0–0.2)
IMM GRANULOCYTES NFR BLD AUTO: 0 % (ref 0–2)
LYMPHOCYTES # BLD AUTO: 0.39 THOUSANDS/ÂΜL (ref 0.6–4.47)
LYMPHOCYTES NFR BLD AUTO: 15 % (ref 14–44)
MCH RBC QN AUTO: 25.2 PG (ref 26.8–34.3)
MCHC RBC AUTO-ENTMCNC: 30.7 G/DL (ref 31.4–37.4)
MCV RBC AUTO: 82 FL (ref 82–98)
MONOCYTES # BLD AUTO: 0.27 THOUSAND/ÂΜL (ref 0.17–1.22)
MONOCYTES NFR BLD AUTO: 10 % (ref 4–12)
NEUTROPHILS # BLD AUTO: 1.69 THOUSANDS/ÂΜL (ref 1.85–7.62)
NEUTS SEG NFR BLD AUTO: 65 % (ref 43–75)
NRBC BLD AUTO-RTO: 0 /100 WBCS
PLATELET # BLD AUTO: 82 THOUSANDS/UL (ref 149–390)
POTASSIUM SERPL-SCNC: 4.4 MMOL/L (ref 3.5–5.3)
PROT SERPL-MCNC: 7.3 G/DL (ref 6.4–8.4)
RBC # BLD AUTO: 3.65 MILLION/UL (ref 3.88–5.62)
SODIUM SERPL-SCNC: 138 MMOL/L (ref 135–147)
WBC # BLD AUTO: 2.62 THOUSAND/UL (ref 4.31–10.16)

## 2024-06-26 PROCEDURE — 80053 COMPREHEN METABOLIC PANEL: CPT

## 2024-06-26 PROCEDURE — 85025 COMPLETE CBC W/AUTO DIFF WBC: CPT

## 2024-06-26 PROCEDURE — 36415 COLL VENOUS BLD VENIPUNCTURE: CPT

## 2024-06-26 NOTE — TELEPHONE ENCOUNTER
I spoke with pt's wife yesterday and she had concerns regarding the communication between departments and physicians. She stated that she wanted someone to contact her so she could file a complaint. I called back yesterday when I spoke with the patient to schedule his iron infusion, but she was not available. I called back today and LM for her with unit phone number to return my call if she would like to proceed.

## 2024-06-27 ENCOUNTER — OFFICE VISIT (OUTPATIENT)
Age: 69
End: 2024-06-27
Payer: MEDICARE

## 2024-06-27 ENCOUNTER — TELEPHONE (OUTPATIENT)
Dept: INFUSION CENTER | Facility: HOSPITAL | Age: 69
End: 2024-06-27

## 2024-06-27 ENCOUNTER — TELEPHONE (OUTPATIENT)
Age: 69
End: 2024-06-27

## 2024-06-27 ENCOUNTER — APPOINTMENT (OUTPATIENT)
Dept: RADIOLOGY | Facility: CLINIC | Age: 69
End: 2024-06-27
Payer: MEDICARE

## 2024-06-27 VITALS
SYSTOLIC BLOOD PRESSURE: 126 MMHG | HEART RATE: 74 BPM | BODY MASS INDEX: 32.76 KG/M2 | DIASTOLIC BLOOD PRESSURE: 68 MMHG | WEIGHT: 234 LBS | HEIGHT: 71 IN

## 2024-06-27 DIAGNOSIS — M79.673 PAIN OF FOOT, UNSPECIFIED LATERALITY: ICD-10-CM

## 2024-06-27 DIAGNOSIS — L97.422 DIABETIC ULCER OF LEFT MIDFOOT ASSOCIATED WITH TYPE 2 DIABETES MELLITUS, WITH FAT LAYER EXPOSED (HCC): ICD-10-CM

## 2024-06-27 DIAGNOSIS — M21.962 METATARSAL DEFORMITY, LEFT: ICD-10-CM

## 2024-06-27 DIAGNOSIS — E11.621 DIABETIC ULCER OF LEFT MIDFOOT ASSOCIATED WITH TYPE 2 DIABETES MELLITUS, WITH FAT LAYER EXPOSED (HCC): ICD-10-CM

## 2024-06-27 DIAGNOSIS — M79.673 PAIN OF FOOT, UNSPECIFIED LATERALITY: Primary | ICD-10-CM

## 2024-06-27 PROCEDURE — 11042 DBRDMT SUBQ TIS 1ST 20SQCM/<: CPT | Performed by: STUDENT IN AN ORGANIZED HEALTH CARE EDUCATION/TRAINING PROGRAM

## 2024-06-27 PROCEDURE — 87205 SMEAR GRAM STAIN: CPT | Performed by: STUDENT IN AN ORGANIZED HEALTH CARE EDUCATION/TRAINING PROGRAM

## 2024-06-27 PROCEDURE — 87070 CULTURE OTHR SPECIMN AEROBIC: CPT | Performed by: STUDENT IN AN ORGANIZED HEALTH CARE EDUCATION/TRAINING PROGRAM

## 2024-06-27 PROCEDURE — 99214 OFFICE O/P EST MOD 30 MIN: CPT | Performed by: STUDENT IN AN ORGANIZED HEALTH CARE EDUCATION/TRAINING PROGRAM

## 2024-06-27 PROCEDURE — 87075 CULTR BACTERIA EXCEPT BLOOD: CPT | Performed by: STUDENT IN AN ORGANIZED HEALTH CARE EDUCATION/TRAINING PROGRAM

## 2024-06-27 PROCEDURE — 87186 SC STD MICRODIL/AGAR DIL: CPT | Performed by: STUDENT IN AN ORGANIZED HEALTH CARE EDUCATION/TRAINING PROGRAM

## 2024-06-27 PROCEDURE — 87077 CULTURE AEROBIC IDENTIFY: CPT | Performed by: STUDENT IN AN ORGANIZED HEALTH CARE EDUCATION/TRAINING PROGRAM

## 2024-06-27 PROCEDURE — 73630 X-RAY EXAM OF FOOT: CPT

## 2024-06-27 RX ORDER — CEPHALEXIN 500 MG/1
500 CAPSULE ORAL EVERY 6 HOURS SCHEDULED
Qty: 28 CAPSULE | Refills: 0 | Status: SHIPPED | OUTPATIENT
Start: 2024-06-27 | End: 2024-07-04

## 2024-06-27 NOTE — PROGRESS NOTES
[facility] Podiatric Medicine and Surgery Office Visit    ASSESSMENT     Diagnoses and all orders for this visit:    Pain of foot, unspecified laterality  -     XR foot 3+ vw left; Future    Diabetic ulcer of left midfoot associated with type 2 diabetes mellitus, with fat layer exposed (HCC)  -     cephalexin (KEFLEX) 500 mg capsule; Take 1 capsule (500 mg total) by mouth every 6 (six) hours for 7 days  -     Cancel: Wound culture and Gram stain; Future  -     Anaerobic culture and Gram stain; Future  -     Cam Boot  -     Wound culture and Gram stain; Future  -     Anaerobic culture and Gram stain  -     Wound culture and Gram stain  -     Debridement    Metatarsal deformity, left         Problem List Items Addressed This Visit          Endocrine    Diabetic ulcer of left midfoot associated with type 2 diabetes mellitus, with fat layer exposed (HCC)    Relevant Medications    cephalexin (KEFLEX) 500 mg capsule    Other Relevant Orders    Anaerobic culture and Gram stain    Cam Boot    Wound culture and Gram stain (Completed)    Debridement     Other Visit Diagnoses       Pain of foot, unspecified laterality    -  Primary    Relevant Orders    XR foot 3+ vw left    Metatarsal deformity, left              PLAN  -The patient and I discussed his right foot  -Patient was educated on local (redness, swelling, warmth, white or malodorous drainage) and systemic (nausea, vomiting, fevers, chills, shortness of breath, chest pain, diaphoresis, fatigue) signs of infection.  They were instructed to go immediately to the emergency department should any of these present.  -Wound debridement performed as described below  -Wound dressed with Betadine, Adaptic, offloading padding, DSD.  Patient was provided with these supplies and was instructed on home dressing changes.  -Patient was instructed to wear their CAM boot at all times  -CBC from 6/26/2024 reviewed: Pancytopenia noted with decreased white blood cell count, hemoglobin,  "hematocrit, platelets  -Most recent hemoglobin A1c from 5/17/2024 reviewed: 9.9%  -Rx Keflex given purulent drainage noted  -Follow-up wound culture  -Follow-up anaerobic wound culture  -Will plan to see patient at the wound healing center next week    X-ray of the left foot from 6/27/2024 was interpreted independently: No cortical erosion noted to suggest osteomyelitis.  I do note that the left second metatarsal is elongated and plantarflexed compared to the adjacent metatarsals, this is likely the source of the patient's left plantar foot pressure.      Debridement    Universal Protocol:  Consent: Verbal consent obtained.  Risks and benefits: risks, benefits and alternatives were discussed  Consent given by: patient  Time out: Immediately prior to procedure a \"time out\" was called to verify the correct patient, procedure, equipment, support staff and site/side marked as required.  Patient understanding: patient states understanding of the procedure being performed  Patient identity confirmed: verbally with patient    Debridement Details  Performed by: physician  Debridement type: surgical  Level of debridement: subcutaneous tissue      Post-debridement measurements  Length (cm): 3  Width (cm): 1  Depth (cm): 0.4  Percent debrided: 80%  Surface Area (cm^2): 3  Area Debrided (cm^2): 2.4  Volume (cm^3): 1.2    Tissue and other material debrided: dermis, epidermis and subcutaneous tissue  Devitalized tissue debrided: callus, exudate, necrotic debris and slough  Instrument(s) utilized: blade and forceps  Bleeding: small  Hemostasis obtained with: pressure and silver nitrate  Procedural pain (0-10): insensate  Post-procedural pain: insensate   Response to treatment: procedure was tolerated well       SUBJECTIVE    Chief Complaint:  Left foot wound     Patient ID: Isael Avendano     6/27/2024: Isael is a pleasant 68-year-old male with a past medical history significant for alcohol abuse, type 2 diabetes, hypertension, " "pancytopenia, stage III CKD who presents today for evaluation of his left foot.  He states that for the past 2 to 3 days the bottom of his left foot has gotten discolored with a blue/purple spot that has gotten \"wider and wider\" he denies any drainage to the area.  He states that he has noticed that his toes are now becoming red as well.  He denies having any neuropathy.  He states that he has not had any nausea, vomiting, fevers, chills, shortness of breath, chest pain but has felt extremely fatigued recently.        The following portions of the patient's history were reviewed and updated as appropriate: allergies, current medications, past family history, past medical history, past social history, past surgical history and problem list.    Review of Systems   Constitutional:  Positive for fatigue. Negative for appetite change, chills, diaphoresis and fever.   HENT: Negative.     Gastrointestinal: Negative.    Skin:  Positive for color change and wound.         OBJECTIVE      /68   Pulse 74   Ht 5' 11\" (1.803 m)   Wt 106 kg (234 lb)   BMI 32.64 kg/m²        Physical Exam  Constitutional:       Appearance: Normal appearance. He is not ill-appearing or diaphoretic.   HENT:      Head: Normocephalic and atraumatic.   Eyes:      General:         Right eye: No discharge.         Left eye: No discharge.   Cardiovascular:      Pulses:           Dorsalis pedis pulses are 2+ on the right side and 2+ on the left side.        Posterior tibial pulses are 2+ on the right side and 2+ on the left side.   Pulmonary:      Effort: Pulmonary effort is normal. No respiratory distress.   Feet:      Right foot:      Protective Sensation: 10 sites tested.  0 sites sensed.      Left foot:      Protective Sensation: 10 sites tested.  0 sites sensed.   Skin:     Capillary Refill: Capillary refill takes less than 2 seconds.   Neurological:      Mental Status: He is alert.      Sensory: Sensory deficit present.   Psychiatric:        "  Mood and Affect: Mood normal.         Vascular:  -DP and PT pulses intact b/l  -Capillary refill time <2 sec b/l  -Digital hair growth: Present  -Skin temp: WNL    MSK:  -Pain on palpation left plantar foot  -Significantly plantarflexed left second metatarsal bone noted  -MMT is 5/5 to all muscle compartments of the lower extremity  -Ankle dorsiflexion >10 degrees with knee extended and knee flexed b/l    Neuro:  -Light sensation intact bilaterally  -Protective sensation diminished    Derm:  -No noted interdigital maceration, peeling, malodor  -No callus formation noted on exam    Wound #: 1  Location: Left plantar foot, submet 2  Length 3.0 cm: Width 1.0 cm: Depth 0.4 cm:   Deepest Tissue Noted in Base: Subcutaneous tissue  Probe to Bone: No  Peripheral Skin Description: Attached and hyperkeratotic  Granulation: 100% Fibrotic Tissue: 0% Necrotic Tissue: 0%   Drainage Amount: moderate, bloody, purulent  Signs of Infection: Yes  Total debrided <20 square centimeters

## 2024-06-27 NOTE — TELEPHONE ENCOUNTER
Caller: Isael    Doctor:Dr. Lucas    Reason for call: Patient stated his ulcer is getting worse and he does no  know how to upload pictures to my chart. He feels he should be seen.  Thank you     Call back#: [phone]   14-Aug-2023 15:45

## 2024-06-27 NOTE — TELEPHONE ENCOUNTER
Pt's platelets are 82 as of 6/26/24.      Spoke w/ [facility] canceled appt.     LVM notifying pt of platelet leverl and to come to liver biopsy procedure as scheduled 6/28/24 @0930. His infusion center appt has been cancelled

## 2024-06-28 ENCOUNTER — HOSPITAL ENCOUNTER (OUTPATIENT)
Dept: INFUSION CENTER | Facility: HOSPITAL | Age: 69
Discharge: HOME | End: 2024-06-28

## 2024-06-28 ENCOUNTER — HOSPITAL ENCOUNTER (OUTPATIENT)
Dept: RADIOLOGY | Facility: HOSPITAL | Age: 69
Discharge: HOME | End: 2024-06-28
Attending: RADIOLOGY
Payer: MEDICARE

## 2024-06-28 VITALS
OXYGEN SATURATION: 97 % | HEART RATE: 87 BPM | RESPIRATION RATE: 18 BRPM | DIASTOLIC BLOOD PRESSURE: 64 MMHG | TEMPERATURE: 97.7 F | BODY MASS INDEX: 32.9 KG/M2 | HEIGHT: 71 IN | SYSTOLIC BLOOD PRESSURE: 132 MMHG | WEIGHT: 235 LBS

## 2024-06-28 DIAGNOSIS — D69.6 THROMBOCYTOPENIA (HCC): ICD-10-CM

## 2024-06-28 DIAGNOSIS — K70.31 ALCOHOLIC CIRRHOSIS OF LIVER WITH ASCITES (HCC): ICD-10-CM

## 2024-06-28 DIAGNOSIS — C22.0 HEPATOCELLULAR CARCINOMA (HCC): Primary | ICD-10-CM

## 2024-06-28 DIAGNOSIS — K70.30 ALCOHOLIC CIRRHOSIS OF LIVER WITHOUT ASCITES (HCC): ICD-10-CM

## 2024-06-28 LAB
BASOPHILS # BLD AUTO: 0.03 THOUSANDS/ÂΜL (ref 0–0.1)
BASOPHILS NFR BLD AUTO: 1 % (ref 0–1)
EOSINOPHIL # BLD AUTO: 0.26 THOUSAND/ÂΜL (ref 0–0.61)
EOSINOPHIL NFR BLD AUTO: 10 % (ref 0–6)
ERYTHROCYTE [DISTWIDTH] IN BLOOD BY AUTOMATED COUNT: 17.5 % (ref 11.6–15.1)
HCT VFR BLD AUTO: 30.9 % (ref 36.5–49.3)
HGB BLD-MCNC: 9.6 G/DL (ref 12–17)
IMM GRANULOCYTES # BLD AUTO: 0.01 THOUSAND/UL (ref 0–0.2)
IMM GRANULOCYTES NFR BLD AUTO: 0 % (ref 0–2)
INR PPP: 1.16 (ref 0.84–1.19)
LYMPHOCYTES # BLD AUTO: 0.34 THOUSANDS/ÂΜL (ref 0.6–4.47)
LYMPHOCYTES NFR BLD AUTO: 13 % (ref 14–44)
MCH RBC QN AUTO: 25.7 PG (ref 26.8–34.3)
MCHC RBC AUTO-ENTMCNC: 31.1 G/DL (ref 31.4–37.4)
MCV RBC AUTO: 83 FL (ref 82–98)
MONOCYTES # BLD AUTO: 0.22 THOUSAND/ÂΜL (ref 0.17–1.22)
MONOCYTES NFR BLD AUTO: 8 % (ref 4–12)
NEUTROPHILS # BLD AUTO: 1.78 THOUSANDS/ÂΜL (ref 1.85–7.62)
NEUTS SEG NFR BLD AUTO: 68 % (ref 43–75)
NRBC BLD AUTO-RTO: 0 /100 WBCS
PLATELET # BLD AUTO: 102 THOUSANDS/UL (ref 149–390)
PMV BLD AUTO: 12.7 FL (ref 8.9–12.7)
PROTHROMBIN TIME: 14.7 SECONDS (ref 11.6–14.5)
RBC # BLD AUTO: 3.73 MILLION/UL (ref 3.88–5.62)
WBC # BLD AUTO: 2.64 THOUSAND/UL (ref 4.31–10.16)

## 2024-06-28 PROCEDURE — 88341 IMHCHEM/IMCYTCHM EA ADD ANTB: CPT | Performed by: PATHOLOGY

## 2024-06-28 PROCEDURE — 85610 PROTHROMBIN TIME: CPT | Performed by: RADIOLOGY

## 2024-06-28 PROCEDURE — 88307 TISSUE EXAM BY PATHOLOGIST: CPT | Performed by: PATHOLOGY

## 2024-06-28 PROCEDURE — 47000 NEEDLE BIOPSY OF LIVER PERQ: CPT

## 2024-06-28 PROCEDURE — 88313 SPECIAL STAINS GROUP 2: CPT | Performed by: PATHOLOGY

## 2024-06-28 PROCEDURE — 76942 ECHO GUIDE FOR BIOPSY: CPT

## 2024-06-28 PROCEDURE — 77012 CT SCAN FOR NEEDLE BIOPSY: CPT

## 2024-06-28 PROCEDURE — 49180 BIOPSY ABDOMINAL MASS: CPT

## 2024-06-28 PROCEDURE — 88184 FLOWCYTOMETRY/ TC 1 MARKER: CPT | Performed by: INTERNAL MEDICINE

## 2024-06-28 PROCEDURE — 85025 COMPLETE CBC W/AUTO DIFF WBC: CPT | Performed by: RADIOLOGY

## 2024-06-28 PROCEDURE — 99153 MOD SED SAME PHYS/QHP EA: CPT

## 2024-06-28 PROCEDURE — 88185 FLOWCYTOMETRY/TC ADD-ON: CPT | Performed by: INTERNAL MEDICINE

## 2024-06-28 PROCEDURE — 99152 MOD SED SAME PHYS/QHP 5/>YRS: CPT

## 2024-06-28 PROCEDURE — 88342 IMHCHEM/IMCYTCHM 1ST ANTB: CPT | Performed by: PATHOLOGY

## 2024-06-28 RX ORDER — FENTANYL CITRATE 50 UG/ML
INJECTION, SOLUTION INTRAMUSCULAR; INTRAVENOUS AS NEEDED
Status: COMPLETED | OUTPATIENT
Start: 2024-06-28 | End: 2024-06-28

## 2024-06-28 RX ORDER — SODIUM CHLORIDE 9 MG/ML
30 INJECTION, SOLUTION INTRAVENOUS CONTINUOUS
Status: DISCONTINUED | OUTPATIENT
Start: 2024-06-28 | End: 2024-06-29 | Stop reason: HOSPADM

## 2024-06-28 RX ORDER — OXYCODONE HYDROCHLORIDE 5 MG/1
5 TABLET ORAL ONCE AS NEEDED
Status: COMPLETED | OUTPATIENT
Start: 2024-06-28 | End: 2024-06-28

## 2024-06-28 RX ORDER — MIDAZOLAM HYDROCHLORIDE 2 MG/2ML
INJECTION, SOLUTION INTRAMUSCULAR; INTRAVENOUS AS NEEDED
Status: COMPLETED | OUTPATIENT
Start: 2024-06-28 | End: 2024-06-28

## 2024-06-28 RX ADMIN — MIDAZOLAM 0.5 MG: 1 INJECTION INTRAMUSCULAR; INTRAVENOUS at 10:43

## 2024-06-28 RX ADMIN — SODIUM CHLORIDE 30 ML/HR: 0.9 INJECTION, SOLUTION INTRAVENOUS at 08:48

## 2024-06-28 RX ADMIN — FENTANYL CITRATE 25 MCG: 50 INJECTION INTRAMUSCULAR; INTRAVENOUS at 10:36

## 2024-06-28 RX ADMIN — FENTANYL CITRATE 50 MCG: 50 INJECTION INTRAMUSCULAR; INTRAVENOUS at 11:06

## 2024-06-28 RX ADMIN — IOHEXOL 100 ML: 350 INJECTION, SOLUTION INTRAVENOUS at 11:28

## 2024-06-28 RX ADMIN — MIDAZOLAM 0.5 MG: 1 INJECTION INTRAMUSCULAR; INTRAVENOUS at 10:35

## 2024-06-28 RX ADMIN — FENTANYL CITRATE 25 MCG: 50 INJECTION INTRAMUSCULAR; INTRAVENOUS at 10:44

## 2024-06-28 RX ADMIN — OXYCODONE HYDROCHLORIDE 5 MG: 5 TABLET ORAL at 11:49

## 2024-06-28 NOTE — BRIEF OP NOTE (RAD/CATH)
INTERVENTIONAL RADIOLOGY PROCEDURE NOTE    Date: 6/28/2024    Procedure:   Procedure Summary       Date: 06/28/24 Room / Location: [facility] CAT Scan    Anesthesia Start:  Anesthesia Stop:     Procedure: IR BIOPSY LIVER MASS Diagnosis:       Alcoholic cirrhosis of liver with ascites (HCC)      (mass. LRM discussed at tumor board. difficult)    Scheduled Providers: Vickie Bermudez MD Responsible Provider:     Anesthesia Type: Not recorded ASA Status: Not recorded            Preoperative diagnosis:   1. Hepatocellular carcinoma (HCC)    2. Thrombocytopenia (HCC)    3. Alcoholic cirrhosis of liver without ascites (HCC)    4. Alcoholic cirrhosis of liver with ascites (HCC)         Postoperative diagnosis: Same.    Surgeon: Vickie Bermudez MD     Assistant: None. No qualified resident was available.    Blood loss: minimal    Specimens: 18G core x 4 (3 in formalin and 1 in RPMI)     Findings: successful hepatic segment 7 mass biopsy.    Complications: None immediate.    Anesthesia: conscious sedation

## 2024-06-28 NOTE — DISCHARGE INSTRUCTIONS
POST BIOPSY    Care after your procedure:    1. Limit your activities for 24 hours after your biopsy.    2. No driving day of biopsy.    3. Return to your normal diet.Small sips of flat soda will help with mild nausea.    4. Remove band-aid or dressing 24 hours after procedure.     Contact Interventional Radiology at [phone] ([facility] PATIENTS: Contact Interventional Radiology at [phone]) ([facility] PATIENTS: Contact Interventional Radiology at [phone]) if:    1. Difficulty breathing, nausea or vomiting.    2. Chills or fever above 101 degrees F.     3. Pain at biopsy site not relieved by medication.     4. Develop any redness, swelling, heat, unusual drainage, heavy bruising or bleeding from biopsy site.

## 2024-06-29 LAB — BACTERIA SPEC ANAEROBE CULT: NORMAL

## 2024-06-30 LAB
BACTERIA WND AEROBE CULT: ABNORMAL
BACTERIA WND AEROBE CULT: ABNORMAL
GRAM STN SPEC: ABNORMAL

## 2024-07-01 ENCOUNTER — TELEPHONE (OUTPATIENT)
Age: 69
End: 2024-07-01

## 2024-07-01 LAB — SCAN RESULT: NORMAL

## 2024-07-03 ENCOUNTER — APPOINTMENT (OUTPATIENT)
Dept: LAB | Facility: CLINIC | Age: 69
End: 2024-07-03
Payer: MEDICARE

## 2024-07-03 DIAGNOSIS — K70.31 ALCOHOLIC CIRRHOSIS OF LIVER WITH ASCITES (HCC): ICD-10-CM

## 2024-07-03 DIAGNOSIS — D61.818 PANCYTOPENIA (HCC): ICD-10-CM

## 2024-07-03 DIAGNOSIS — E61.1 IRON DEFICIENCY: ICD-10-CM

## 2024-07-03 DIAGNOSIS — C22.0 HEPATOCELLULAR CARCINOMA (HCC): ICD-10-CM

## 2024-07-03 LAB
AFP-TM SERPL-MCNC: 1122.6 NG/ML (ref 0–9)
ALBUMIN SERPL BCG-MCNC: 2.9 G/DL (ref 3.5–5)
ALP SERPL-CCNC: 170 U/L (ref 34–104)
ALT SERPL W P-5'-P-CCNC: 36 U/L (ref 7–52)
ANION GAP SERPL CALCULATED.3IONS-SCNC: 8 MMOL/L (ref 4–13)
AST SERPL W P-5'-P-CCNC: 48 U/L (ref 13–39)
BASOPHILS # BLD AUTO: 0.02 THOUSANDS/ÂΜL (ref 0–0.1)
BASOPHILS NFR BLD AUTO: 1 % (ref 0–1)
BILIRUB SERPL-MCNC: 1.92 MG/DL (ref 0.2–1)
BUN SERPL-MCNC: 20 MG/DL (ref 5–25)
CALCIUM ALBUM COR SERPL-MCNC: 10.3 MG/DL (ref 8.3–10.1)
CALCIUM SERPL-MCNC: 9.4 MG/DL (ref 8.4–10.2)
CHLORIDE SERPL-SCNC: 103 MMOL/L (ref 96–108)
CO2 SERPL-SCNC: 25 MMOL/L (ref 21–32)
CREAT SERPL-MCNC: 1.08 MG/DL (ref 0.6–1.3)
EOSINOPHIL # BLD AUTO: 0.24 THOUSAND/ÂΜL (ref 0–0.61)
EOSINOPHIL NFR BLD AUTO: 11 % (ref 0–6)
ERYTHROCYTE [DISTWIDTH] IN BLOOD BY AUTOMATED COUNT: 18.4 % (ref 11.6–15.1)
GFR SERPL CREATININE-BSD FRML MDRD: 69 ML/MIN/1.73SQ M
GLUCOSE P FAST SERPL-MCNC: 119 MG/DL (ref 65–99)
HCT VFR BLD AUTO: 28.4 % (ref 36.5–49.3)
HGB BLD-MCNC: 8.7 G/DL (ref 12–17)
IMM GRANULOCYTES # BLD AUTO: 0 THOUSAND/UL (ref 0–0.2)
IMM GRANULOCYTES NFR BLD AUTO: 0 % (ref 0–2)
INR PPP: 1.25 (ref 0.84–1.19)
LYMPHOCYTES # BLD AUTO: 0.42 THOUSANDS/ÂΜL (ref 0.6–4.47)
LYMPHOCYTES NFR BLD AUTO: 20 % (ref 14–44)
MCH RBC QN AUTO: 24.8 PG (ref 26.8–34.3)
MCHC RBC AUTO-ENTMCNC: 30.6 G/DL (ref 31.4–37.4)
MCV RBC AUTO: 81 FL (ref 82–98)
MONOCYTES # BLD AUTO: 0.22 THOUSAND/ÂΜL (ref 0.17–1.22)
MONOCYTES NFR BLD AUTO: 10 % (ref 4–12)
NEUTROPHILS # BLD AUTO: 1.21 THOUSANDS/ÂΜL (ref 1.85–7.62)
NEUTS SEG NFR BLD AUTO: 58 % (ref 43–75)
NRBC BLD AUTO-RTO: 0 /100 WBCS
PLATELET # BLD AUTO: 75 THOUSANDS/UL (ref 149–390)
POTASSIUM SERPL-SCNC: 4.3 MMOL/L (ref 3.5–5.3)
PROT SERPL-MCNC: 7 G/DL (ref 6.4–8.4)
PROTHROMBIN TIME: 15.5 SECONDS (ref 11.6–14.5)
RBC # BLD AUTO: 3.51 MILLION/UL (ref 3.88–5.62)
SODIUM SERPL-SCNC: 136 MMOL/L (ref 135–147)
WBC # BLD AUTO: 2.11 THOUSAND/UL (ref 4.31–10.16)

## 2024-07-03 PROCEDURE — 85610 PROTHROMBIN TIME: CPT

## 2024-07-03 PROCEDURE — 36415 COLL VENOUS BLD VENIPUNCTURE: CPT

## 2024-07-03 PROCEDURE — 82105 ALPHA-FETOPROTEIN SERUM: CPT

## 2024-07-03 PROCEDURE — 80053 COMPREHEN METABOLIC PANEL: CPT

## 2024-07-03 PROCEDURE — 85025 COMPLETE CBC W/AUTO DIFF WBC: CPT

## 2024-07-05 ENCOUNTER — OFFICE VISIT (OUTPATIENT)
Dept: WOUND CARE | Facility: HOSPITAL | Age: 69
End: 2024-07-05
Payer: MEDICARE

## 2024-07-05 VITALS
HEART RATE: 90 BPM | SYSTOLIC BLOOD PRESSURE: 128 MMHG | RESPIRATION RATE: 15 BRPM | DIASTOLIC BLOOD PRESSURE: 73 MMHG | TEMPERATURE: 97.7 F | HEIGHT: 71 IN | WEIGHT: 238 LBS | BODY MASS INDEX: 33.32 KG/M2

## 2024-07-05 DIAGNOSIS — Z79.4 TYPE 2 DIABETES MELLITUS WITH HYPOGLYCEMIA WITHOUT COMA, WITH LONG-TERM CURRENT USE OF INSULIN (HCC): ICD-10-CM

## 2024-07-05 DIAGNOSIS — E11.649 TYPE 2 DIABETES MELLITUS WITH HYPOGLYCEMIA WITHOUT COMA, WITH LONG-TERM CURRENT USE OF INSULIN (HCC): ICD-10-CM

## 2024-07-05 DIAGNOSIS — E11.621 DIABETIC ULCER OF OTHER PART OF LEFT FOOT ASSOCIATED WITH TYPE 2 DIABETES MELLITUS, WITH FAT LAYER EXPOSED (HCC): Primary | ICD-10-CM

## 2024-07-05 DIAGNOSIS — L97.522 DIABETIC ULCER OF OTHER PART OF LEFT FOOT ASSOCIATED WITH TYPE 2 DIABETES MELLITUS, WITH FAT LAYER EXPOSED (HCC): Primary | ICD-10-CM

## 2024-07-05 PROCEDURE — 99213 OFFICE O/P EST LOW 20 MIN: CPT | Performed by: STUDENT IN AN ORGANIZED HEALTH CARE EDUCATION/TRAINING PROGRAM

## 2024-07-05 PROCEDURE — 99214 OFFICE O/P EST MOD 30 MIN: CPT | Performed by: STUDENT IN AN ORGANIZED HEALTH CARE EDUCATION/TRAINING PROGRAM

## 2024-07-05 RX ORDER — LIDOCAINE HYDROCHLORIDE 40 MG/ML
5 SOLUTION TOPICAL ONCE
Status: COMPLETED | OUTPATIENT
Start: 2024-07-05 | End: 2024-07-05

## 2024-07-05 RX ADMIN — LIDOCAINE HYDROCHLORIDE 5 ML: 40 SOLUTION TOPICAL at 14:16

## 2024-07-05 NOTE — PATIENT INSTRUCTIONS
You currently have no wound.  It is healed.  Continue to wear the Cam Boot.  Scheduled follow up with Dr. Lucas for 2 weeks.  You are dishcarged from the Wound Center.  If you have any issues/concerns, please call the Wound Center.

## 2024-07-05 NOTE — PROGRESS NOTES
[facility] Podiatric Medicine and Surgery Office Visit    ASSESSMENT     Diagnoses and all orders for this visit:    Diabetic ulcer of other part of left foot associated with type 2 diabetes mellitus, with fat layer exposed ([facility])  -     lidocaine (XYLOCAINE) 4 % topical solution 5 mL    Type 2 diabetes mellitus with hypoglycemia without coma, with long-term current use of insulin ([facility])  -     lidocaine (XYLOCAINE) 4 % topical solution 5 mL    Other orders  -     insulin regular (HumuLIN R,NovoLIN R) 100 units/mL injection; Inject 10 Units under the skin 3 (three) times a day with meals         Problem List Items Addressed This Visit          Endocrine    DM2 (diabetes mellitus, type 2) ([facility])    Relevant Medications    insulin regular (HumuLIN R,NovoLIN R) 100 units/mL injection    lidocaine (XYLOCAINE) 4 % topical solution 5 mL (Completed)    Diabetic ulcer of left foot associated with type 2 diabetes mellitus ([facility]) - Primary    Relevant Medications    insulin regular (HumuLIN R,NovoLIN R) 100 units/mL injection    lidocaine (XYLOCAINE) 4 % topical solution 5 mL (Completed)     PLAN  -The patient and I discussed their bilateral feet  -All previous areas of ulceration are fully healed at this time without any drainage or local signs of infection  -The patient should continue to offload their bilateral feet as if wounds are present, especially for the first few weeks after wound healing as skin is fragile  -Continue to check feet daily for ulcerations or signs of infection  -Patient should follow-up with their outpatient podiatrist for at risk footcare  -Patient is discharged from the wound care center at this time, please call to make an appointment should any new wound care concerns arise     SUBJECTIVE    Chief Complaint:  Left foot wound, now healed     Patient ID: Isael Avendano     6/27/2024: Isael is a pleasant 68-year-old male with a past medical history significant for alcohol abuse, type 2 diabetes,  "hypertension, pancytopenia, stage III CKD who presents today for evaluation of his left foot.  He states that for the past 2 to 3 days the bottom of his left foot has gotten discolored with a blue/purple spot that has gotten \"wider and wider\" he denies any drainage to the area.  He states that he has noticed that his toes are now becoming red as well.  He denies having any neuropathy.  He states that he has not had any nausea, vomiting, fevers, chills, shortness of breath, chest pain but has felt extremely fatigued recently.    7/5/2024: Isael is doing extremely well today.  He states that he has been wearing his cam boot at all times since his previous visit.  He states that he has been performing dressing changes at home utilizing Dermagran, he believes that his left foot ulceration may be healed at this time.        The following portions of the patient's history were reviewed and updated as appropriate: allergies, current medications, past family history, past medical history, past social history, past surgical history and problem list.    Review of Systems   Constitutional:  Negative for appetite change, chills, diaphoresis, fatigue and fever.   HENT: Negative.     Gastrointestinal: Negative.    Skin:  Negative for color change and wound.         OBJECTIVE      /73   Pulse 90   Temp 97.7 °F (36.5 °C)   Resp 15   Ht 5' 11\" (1.803 m)   Wt 108 kg (238 lb)   BMI 33.19 kg/m²        Physical Exam  Constitutional:       Appearance: Normal appearance. He is not ill-appearing or diaphoretic.   HENT:      Head: Normocephalic and atraumatic.   Eyes:      General:         Right eye: No discharge.         Left eye: No discharge.   Cardiovascular:      Pulses:           Dorsalis pedis pulses are 2+ on the right side and 2+ on the left side.        Posterior tibial pulses are 2+ on the right side and 2+ on the left side.   Pulmonary:      Effort: Pulmonary effort is normal. No respiratory distress.   Feet:      " Right foot:      Protective Sensation: 10 sites tested.  0 sites sensed.      Left foot:      Protective Sensation: 10 sites tested.  0 sites sensed.   Skin:     Capillary Refill: Capillary refill takes less than 2 seconds.   Neurological:      Mental Status: He is alert.      Sensory: Sensory deficit present.   Psychiatric:         Mood and Affect: Mood normal.

## 2024-07-09 ENCOUNTER — OFFICE VISIT (OUTPATIENT)
Dept: GASTROENTEROLOGY | Facility: CLINIC | Age: 69
End: 2024-07-09
Payer: MEDICARE

## 2024-07-09 VITALS
BODY MASS INDEX: 33.04 KG/M2 | DIASTOLIC BLOOD PRESSURE: 60 MMHG | SYSTOLIC BLOOD PRESSURE: 118 MMHG | HEIGHT: 71 IN | WEIGHT: 236 LBS | TEMPERATURE: 96.5 F

## 2024-07-09 DIAGNOSIS — C22.0 HEPATOCELLULAR CARCINOMA (HCC): ICD-10-CM

## 2024-07-09 DIAGNOSIS — I85.00 ESOPHAGEAL VARICES DETERMINED BY ENDOSCOPY (HCC): ICD-10-CM

## 2024-07-09 DIAGNOSIS — R60.0 BILATERAL LEG EDEMA: ICD-10-CM

## 2024-07-09 DIAGNOSIS — K70.30 ALCOHOLIC CIRRHOSIS OF LIVER WITHOUT ASCITES (HCC): Primary | ICD-10-CM

## 2024-07-09 DIAGNOSIS — R93.1 ABNORMAL FINDINGS ON DIAGNOSTIC IMAGING OF HEART AND CORONARY CIRCULATION: ICD-10-CM

## 2024-07-09 DIAGNOSIS — R77.2 ELEVATED AFP: ICD-10-CM

## 2024-07-09 PROCEDURE — G2211 COMPLEX E/M VISIT ADD ON: HCPCS | Performed by: INTERNAL MEDICINE

## 2024-07-09 PROCEDURE — 99214 OFFICE O/P EST MOD 30 MIN: CPT | Performed by: INTERNAL MEDICINE

## 2024-07-09 RX ORDER — LACTULOSE 10 G/15ML
20 SOLUTION ORAL 2 TIMES DAILY
COMMUNITY
Start: 2024-06-21

## 2024-07-09 NOTE — DISCHARGE INSTR - AVS FIRST PAGE
The patient's goals for the shift include      The clinical goals for the shift include PT WILL USE THE CALL LIGHT WHEN ASSSITANCE IS NEEDED TO PREVENT FALLS         What to Expect Before and After Shoulder Replacement Surgery  You are being scheduled for a shoulder replacement by Dr Ayesha Rojas to treat your shoulder condition  Here is some information which may help to answer questions that you may have  Please do not hesitate to reach out to our team to answer questions not addressed here  Before Surgery  You will be contacted the evening prior to your surgery to confirm the scheduled time of the procedure and when to arrive at the hospital    Do not eat or drink anything after midnight the night before your surgery so that the anesthesia can be performed safely  If you have been fitted for a sling in the office prior to the surgery please remember to bring it to the hospital   You will meet the anesthesiologist the morning of the surgery  The surgery is performed under a general anesthetic but they will also offer you a regional block (shot to numb the arm) to help control your post-operative pain as well as with a catheter that is left in place after the block  The catheter is connected to a small pump which will continue to provide numbing medicine and help prolong the pain control from the block  Unfortunately this catheter is not as effective as the initial block, but can still be very helpful in managing the pain  After Surgery and in the Hospital  The shoulder replacement surgery typically takes 60-90 minutes  When surgery is completed, your surgeon will update your family and friends on your condition and progress  You will remain in the recovery room for at least an hour or until the anesthesia has worn off and your blood pressure and pulse are stable  If you have pain, the nurses will give you medication  Once out of surgery, your surgeon will decide on how long you will be using a sling in order to protect and position your shoulder  However, this won't keep you from starting physical therapy    Exercises typically begin on the day after surgery with emphasis on moving the shoulder, wrist, and hand  The physical therapist will be provided with a detailed protocol but typically the first 6 weeks are used to regain range of motion and then strengthening is initiated  Starting strengthening exercises too early may lead to complications  When You Are Discharged from the [address] [place] [address] can expect to be released from the hospital the day after surgery (this may change if you have special needs or medical conditions)  Before you are released, the treatment team (Orthopaedic surgery residents, physician assistants and physical therapists) will talk with you about the importance of limiting any sudden or stressful movements to the arm for several weeks or longer  Activities that involve pushing, pulling, and lifting should not be done until you are given permission from your surgeon  Your First Day at [address] [place] [address] may need help with your daily activities, so it is a good idea to have family and friends prepared to help you  It is okay to remove the sling and let the arm hang at the side so that you can get cleaned and change your clothes  To put on a shirt, place the bad arm in first and then the good arm  Reverse to take it off  Don't forget to wear the sling every night for at least the first month after surgery, and never use your arm to push yourself up in bed or from a chair  The added weight on your shoulder may cause you to re-injure the joint  How to [place] in the First Week  You are encouraged to return to your normal eating and sleeping patterns as soon as possible  It is important for you to be active in order to control your weight and muscle tone  It is ok to increase your activity level and even perform light aerobic exercise (like walking or riding a stationary bike) within the first week or so if you are feeling up to it    If there is concern about these activates best to wait until the first post-operative visit and discuss this with the team    Mabel Hitchcock might be able to return to work within several days if you can perform your job while wearing a sling  Consult with your doctor, as this differs from patient to patient  However, if your job requires heavy lifting or climbing, there may be a delay for several months  Until you are seen for your first follow-up visit, please try and keep wound dry  It is okay to shower but try your best to keep the incision out of direct contact with the water (or consider using a waterproof bandage)  If it does gets wet please dry as best as possible afterwards  If you notice any drainage or a foul odor from your incision or your temperature goes above 101 5 degrees, please contact the office  What You Can Expect in the First Month  Your first post-operative appointment will be with Dr Galilea Son physician assistant (PA) around 2 weeks after the surgery  You skin staples will be removed and X-rays will be obtained at that visit  Please understand that it is quite common to still experience pain at that time but the pain should be steadily improving  Hopefully physical therapy has already begun but if not it will be initiated at this visit and will continue for the 8-12 weeks  At about 12 weeks after surgery you will start a progressive strengthening program  Physical therapy is a deliberate process of not only strengthening your shoulder but also altering how you use your arm  It may be many months before your desired results are achieved, so do not get discouraged  Your shoulder will generally continue to improve steadily up to 6-8 months after surgery  After that point further improvement is very slow; although it has been shown that even after a year or more, activity can increase as muscle strength continues to improve  After the First Month at Home   Because each person heals differently, there are different recovery timelines  An average recovery period typically lasts about between 3-6 months  Talk with your surgeon about which activities will be appropriate for you once you have recovered

## 2024-07-09 NOTE — PATIENT INSTRUCTIONS
CT chest and Abdomen Pelvis 7/11/24 @ [facility]  MRI 07/13/2024 @ [facility]  Echo Complete and Echo Stress test @ [place] [facility] 07/16/2024

## 2024-07-09 NOTE — PROGRESS NOTES
[facility] - Outpatient Follow-up Note  Isael Avendano 68 y.o. male MRN: [id number]  Encounter: [id number]          ASSESSMENT AND PLAN:    69 yo M with history of hypertension, MEG, type 2 diabetes mellitus and decompensated alcoholic cirrhosis with HCC as well as nonbleeding esophageal varices status post banding for primary prophylaxis since for hepatology follow-up.  Most recent imaging showed 5 cm LR 5 segment 7 lesion and 2 cm LR for segment 4 lesion. Now following with [facility] for consideration of liver transplant and localized therapy for HCC.    Alcohol induced cirrhosis d/b HCC  HCC   - Etiology: alcohol  - Esophageal variceal screening: Most recent EGD 6/6/2024 with single medium varix with 1 band placed successfully and moderate portal hypertensive gastropathy with plan for repeat in 2 months  - Ascites: Continue Bumex 1 mg p.o. daily.  Recommend 2g Na diet (no added salt to food and no pre-packaged foods).   - Portosystemic encephalopathy: None present at this time. No indication for lactulose or rifaximin.   - HCC Screening: following with [facility] for above lesions.AFP increased from 67 to 1122 on 7/3. Will order imaging with MRI abd, CT chest and CT abd/pel as well as cardiac testing with TTE with bubble and dobutamine stress test   - HRS: Currently, no evidence of HRS. Avoid NSAIDs, ARBs, ACE-Is  - Nutrition: avoid malnutrition and sarcopenia, try and walk every day, drink nutritional supplement such as Glucerna (at least 1 per day, and ideally before bedtime)  - Yearly flu shot, pneumococcal vaccine   - Avoid raw fish, fish tanks, shellfish  - Immunizations for hepatitis A and B   - Avoid alcohol, tobacco use, NSAIDs  - Okay to take up to 2 g of Tylenol  - Screening colonoscopy in 2026 depending on clinical course  MELD 3.0: 12 at 7/8/2024  7:37 AM  MELD-Na: 12 at 7/8/2024  7:37 AM  Calculated from:  Serum Creatinine: 1.09 mg/dL at 7/8/2024  7:37 AM  Serum Sodium: 140 mmol/L  "(Using max of 137 mmol/L) at 7/8/2024  7:37 AM  Total Bilirubin: 1.8 mg/dL at 7/8/2024  7:37 AM  Serum Albumin: 3.1 g/dL at 7/8/2024  7:37 AM  INR(ratio): 1.2 at 7/8/2024  7:37 AM  Age at listing (hypothetical): 68 years  Sex: Male at 7/8/2024  7:37 AM    ______________________________________________________________________    SUBJECTIVE:  67 yo M with history of hypertension, MEG, type 2 diabetes mellitus and decompensated alcoholic cirrhosis with HCC as well as nonbleeding esophageal varices status post banding for primary prophylaxis since for hepatology follow-up.  Most recent imaging showed 5 cm LR 5 segment 7 lesion and 2 cm LR for segment 4 lesion.  Currently on Bumex 1 mg p.o. daily.  Multidisciplinary discussion on 6/14 with decision regarding attempt to retrieve liver biopsy of LI-RADS 5 lesion which was performed on 6/28. Overall feeling well. Continuing to wrap his legs, which is helping edema. Continues with yard work around the house. Denies fevers, chills, nausea, vomiting, abd pain. No alcohol or tobacco. No NSAIDs.    Per transplant team yesterday:  \"AFP from 68 -> over 1,000.     He will get updated MRI abdomen now, plans to do at [facility] and then let us know.     He will also get CT chest for staging- I added non-contrast CT abd/pelvis to evaluate for peripheral arterial disease per transplant surgeon request.    If he remains within [place] by imaging, plan will be for IR consult here at [facility] to discuss selective TACE to LR5 tumor while continuing transplant work-up. If he has significant progression, we should instead consider Med Onc consult for systemic therapy. \"      REVIEW OF SYSTEMS IS OTHERWISE NEGATIVE.      Historical Information   Past Medical History:   Diagnosis Date    ABILIO (acute kidney injury) (HCC) 12/25/2020    Arrhythmia     Chronic kidney disease     COVID 12/2020    CPAP (continuous positive airway pressure) dependence     Diabetes mellitus (HCC)     History of echocardiogram " 11/14/2017    showed EF of 50-55 percentWith moderate LVH and left ventricle diastolic dysfunction. Left atrium was moderately enlarged. Trace MR noted.     History of Holter monitoring 11/21/2017    showed baseline rhythm of sinus origin with an average heart it of 61 bpm. The lowest heart rate was 49 and the highest heart rate was 10 8 bpm. There were rare single VPCs, and frequent PACs representing 3.2% of total beats. There were several episodes of sinus arrhythmias with sinus bradycardia and heart rate ranging from 40-90 bpm. No sustained dysrhythmias, or pauses noted. The patient did not    History of transfusion 04/2023    platelets    Hypertension     Liver disease     cirhosis    Morbid obesity (HCC) 12/08/2022    Murmur, cardiac     Obese     Obesity, morbid (HCC) 12/08/2022    Sleep apnea      Past Surgical History:   Procedure Laterality Date    CATARACT EXTRACTION      EYE SURGERY Left 1997    FL GUIDED NEEDLE PLAC BX/ASP/INJ  8/28/2023    HERNIA REPAIR  [id number]    IR BIOPSY LIVER MASS  6/28/2024    IR PARACENTESIS  2/19/2024    IR PARACENTESIS  2/21/2024    JOINT REPLACEMENT Right     TKR    KNEE ARTHROPLASTY Right 2008    AL ARTHROPLASTY GLENOHUMERAL JOINT TOTAL SHOULDER Left 04/04/2023    Procedure: ARTHROPLASTY SHOULDER REVERSE;  Surgeon: Marcelo Lester MD;  Location: [facility];  Service: Orthopedics    AL JARED SHOULDER ARTHRPLSTY HUMERAL&GLENOID COMPNT Left 9/8/2023    Procedure: removal of antibiotic spacer, incision and debridement,  with revision reverse shoulder arthroplasty;  Surgeon: Lita Aguilar;  Location: [facility];  Service: Orthopedics    AL JARED SHOULDER ARTHRPLSTY HUMERAL/GLENOID COMPNT Left 06/13/2023    Procedure: ARTHROPLASTY SHOULDER REVISION;  Surgeon: Lita Aguilar;  Location: [facility];  Service: Orthopedics    SHOULDER SURGERY Right 2002    WOUND DEBRIDEMENT Left 05/02/2023    Procedure: INCISION AND DRAINAGE (I&D) EXTREMITY, vac placement;  Surgeon: Marcelo  "Adolfo Lester MD;  Location: [facility];  Service: Orthopedics     Social History   Social History     Substance and Sexual Activity   Alcohol Use Not Currently    Comment: quit      Social History     Substance and Sexual Activity   Drug Use Never     Social History     Tobacco Use   Smoking Status Former    Current packs/day: 0.00    Average packs/day: 0.5 packs/day for 20.0 years (10.0 ttl pk-yrs)    Types: Cigarettes    Start date:     Quit date:     Years since quittin.5   Smokeless Tobacco Never     Family History   Problem Relation Age of Onset    Diabetes Mother     Supraventricular tachycardia Mother     COPD Father     Heart disease Father     Other Father         Sepsis; related to UTI with complicating cardiac problems    Heart disease Paternal Grandmother     Prostate cancer Paternal Grandfather 82       Meds/Allergies       Current Outpatient Medications:     bumetanide (BUMEX) 1 mg tablet    glimepiride (AMARYL) 4 mg tablet    insulin glargine (LANTUS) 100 units/mL subcutaneous injection    Insulin Pen Needle (BD Pen Needle Nayla 2nd Gen) 32G X 4 MM MISC    insulin regular (HumuLIN R,NovoLIN R) 100 units/mL injection    lactulose (CHRONULAC) 10 g/15 mL solution    MILK THISTLE PO    Multiple Vitamin (multivitamin) capsule    pantoprazole (PROTONIX) 40 mg tablet    potassium chloride (Klor-Con M20) 20 mEq tablet    tamsulosin (FLOMAX) 0.4 mg    traZODone (DESYREL) 50 mg tablet    Allergies   Allergen Reactions    Sulfa Antibiotics Hives    Daptomycin Other (See Comments)     Possibly contributed to ABILIO on 2023 admission            Objective     Blood pressure 118/60, temperature (!) 96.5 °F (35.8 °C), temperature source Tympanic, height 5' 11\" (1.803 m), weight 107 kg (236 lb). Body mass index is 32.92 kg/m².      PHYSICAL EXAM:      General Appearance:   Alert, cooperative, no distress   HEENT:   Normocephalic, atraumatic, anicteric.     Neck:  Supple, symmetrical, trachea " midline   Lungs:   Respirations unlabored    Heart::   Regular rate    Abdomen:   Soft, non-tender, non-distended; normal bowel sounds; no masses, no organomegaly    Genitalia:   Deferred    Rectal:   Deferred    Extremities:  No cyanosis, clubbing, 1 + pitting edema bilaterally with indurated skin   Pulses:  Symmetric    Skin:  No jaundice, rashes, or lesions    Lymph nodes:  No palpable cervical lymphadenopathy        Lab Results:   No visits with results within 1 Day(s) from this visit.   Latest known visit with results is:   Appointment on 07/03/2024   Component Date Value    WBC 07/03/2024 2.11 (L)     RBC 07/03/2024 3.51 (L)     Hemoglobin 07/03/2024 8.7 (L)     Hematocrit 07/03/2024 28.4 (L)     MCV 07/03/2024 81 (L)     MCH 07/03/2024 24.8 (L)     MCHC 07/03/2024 30.6 (L)     RDW 07/03/2024 18.4 (H)     Platelets 07/03/2024 75 (L)     nRBC 07/03/2024 0     Segmented % 07/03/2024 58     Immature Grans % 07/03/2024 0     Lymphocytes % 07/03/2024 20     Monocytes % 07/03/2024 10     Eosinophils Relative 07/03/2024 11 (H)     Basophils Relative 07/03/2024 1     Absolute Neutrophils 07/03/2024 1.21 (L)     Absolute Immature Grans 07/03/2024 0.00     Absolute Lymphocytes 07/03/2024 0.42 (L)     Absolute Monocytes 07/03/2024 0.22     Eosinophils Absolute 07/03/2024 0.24     Basophils Absolute 07/03/2024 0.02     Sodium 07/03/2024 136     Potassium 07/03/2024 4.3     Chloride 07/03/2024 103     CO2 07/03/2024 25     ANION GAP 07/03/2024 8     BUN 07/03/2024 20     Creatinine 07/03/2024 1.08     Glucose, Fasting 07/03/2024 119 (H)     Calcium 07/03/2024 9.4     Corrected Calcium 07/03/2024 10.3 (H)     AST 07/03/2024 48 (H)     ALT 07/03/2024 36     Alkaline Phosphatase 07/03/2024 170 (H)     Total Protein 07/03/2024 7.0     Albumin 07/03/2024 2.9 (L)     Total Bilirubin 07/03/2024 1.92 (H)     eGFR 07/03/2024 69     Protime 07/03/2024 15.5 (H)     INR 07/03/2024 1.25 (H)     AFP TUMOR MARKER 07/03/2024 1,122.60 (H)           Radiology Results:   XR foot 3+ vw left    Result Date: 7/5/2024  Narrative: XR FOOT 3+ VW LEFT INDICATION: M79.673: Pain in unspecified foot. COMPARISON: 5/1/2024 FINDINGS: Persistent hammertoe deformities Severe degenerative changes first MTP joint again evident Heel spurs Persistent pes planus deformity No acute fracture or dislocation. No lytic or blastic osseous lesion. Unremarkable soft tissues.     Impression: Degenerative changes No evidence of osteomyelitis No acute osseous abnormality. Computerized Assisted Algorithm (CAA) may have been used to analyze all applicable images. Workstation performed: [id number]     IR biopsy liver mass    Result Date: 6/30/2024  Narrative: CT-scan guided needle biopsy of a hepatic segment 7 lesion PROCEDURAL PERSONNEL: Attending physician(s): Vickie Bermudez MD Resident physician(s): None Advanced practice provider(s): None INDICATION:  K70.31: Alcoholic cirrhosis of liver with ascites.  Multiple liver lesions on MRI with the largest lesion in segment 7. COMPLICATIONS: No immediate complications. ESTIMATED BLOOD LOSS (mL): Less than 10 CONTRAST: Contrast agent: Omnipaque 350 Contrast volume (mL): 100 RADIATION DOSE: Reference air kerma: 2013.11 mGy-cm ANESTHESIA/SEDATION: Level of anesthesia/sedation: Moderate sedation (conscious sedation) Anesthesia/sedation administered by: Independent trained observer under attending supervision with continuous monitoring of the patient's level of consciousness and physiologic status Total intra-service sedation time (minutes): 45 DESCRIPTION OF PROCEDURE: Informed consent for the procedure including risks, benefits and alternatives was obtained and time-out was performed prior to the procedure. The patient was brought to the CT scanner and placed right anterior oblique on the table. After axial images were obtained through the region of interest, the site was marked, prepared and draped using all elements of maximal sterile barrier  technique including sterile gloves, sterile gown, cap, mask, large sterile sheet, sterile ultrasound probe cover, hand hygiene and cutaneous antisepsis with 2% chlorhexidine. This examination, like all CT scans performed in the [facility], was performed utilizing techniques to minimize radiation dose exposure, including the use of iterative reconstruction and automated exposure control. Limited ultrasound of the liver was also performed. Lidocaine was administered to the skin and a small skin incision was made. Under real-time sonographic guidance, a 17-gauge cannula was advanced up to the lesion. CT scan of the abdomen was performed to confirm appropriate needle position. Based on the findings, the needle guide position was adjusted. Through this, an 18-gauge core biopsy specimen was obtained. At the end of the procedure, thick Gelfoam slurry was used for hemostasis through the cannula as it was removed. The patient tolerated the procedure well and suffered no complication. FINDINGS: 1.  Segment 7 lesion is difficult to see on ultrasound and CT. Very challenging biopsy. 2.  Sonographic image shows needle tip in the segment 7 lesion. 3.  CT with contrast confirms needle tip in the lesion. 4.  Completion CT shows no immediate complication.     Impression: Successful percutaneous core biopsy of a hepatic segment 7 lesion. Workstation performed: [id number]     I have spent a total time of 35 minutes in caring for this patient on the day of the visit/encounter including Diagnostic results, Prognosis, and Risks and benefits of tx options.      Cali Peñaloza  GI Fellow

## 2024-07-10 ENCOUNTER — HOSPITAL ENCOUNTER (OUTPATIENT)
Dept: INFUSION CENTER | Facility: CLINIC | Age: 69
Discharge: HOME | End: 2024-07-10
Payer: MEDICARE

## 2024-07-10 VITALS
TEMPERATURE: 96.5 F | RESPIRATION RATE: 18 BRPM | SYSTOLIC BLOOD PRESSURE: 123 MMHG | DIASTOLIC BLOOD PRESSURE: 58 MMHG | HEART RATE: 78 BPM

## 2024-07-10 DIAGNOSIS — D50.0 IRON DEFICIENCY ANEMIA DUE TO CHRONIC BLOOD LOSS: Primary | ICD-10-CM

## 2024-07-10 DIAGNOSIS — T81.49XA POSTOPERATIVE CELLULITIS OF SURGICAL WOUND: ICD-10-CM

## 2024-07-10 RX ORDER — SODIUM CHLORIDE 9 MG/ML
20 INJECTION, SOLUTION INTRAVENOUS ONCE
Status: COMPLETED | OUTPATIENT
Start: 2024-07-10 | End: 2024-07-10

## 2024-07-10 RX ORDER — SODIUM CHLORIDE 9 MG/ML
20 INJECTION, SOLUTION INTRAVENOUS ONCE
Status: CANCELLED | OUTPATIENT
Start: 2024-07-10

## 2024-07-10 RX ADMIN — SODIUM CHLORIDE 20 ML/HR: 9 INJECTION, SOLUTION INTRAVENOUS at 14:19

## 2024-07-10 RX ADMIN — FERUMOXYTOL 510 MG: 510 INJECTION INTRAVENOUS at 14:19

## 2024-07-10 NOTE — PROGRESS NOTES
Pt here for feraheme infusion, offering no complaints.  Left arm IV placed with positive blood return noted.  Tolerated infusion without incident.  PIV removed.  AVS declined.  No other appts made. Walked out in stable condition.

## 2024-07-11 ENCOUNTER — TELEPHONE (OUTPATIENT)
Age: 69
End: 2024-07-11

## 2024-07-11 ENCOUNTER — HOSPITAL ENCOUNTER (OUTPATIENT)
Dept: RADIOLOGY | Facility: MEDICAL CENTER | Age: 69
Discharge: HOME | End: 2024-07-11
Payer: MEDICARE

## 2024-07-11 ENCOUNTER — OFFICE VISIT (OUTPATIENT)
Age: 69
End: 2024-07-11
Payer: MEDICARE

## 2024-07-11 VITALS — RESPIRATION RATE: 18 BRPM | WEIGHT: 236 LBS | BODY MASS INDEX: 33.04 KG/M2 | HEIGHT: 71 IN

## 2024-07-11 DIAGNOSIS — M21.962 ACQUIRED DEFORMITY OF BOTH FEET: ICD-10-CM

## 2024-07-11 DIAGNOSIS — L97.421 DIABETIC ULCER OF LEFT MIDFOOT ASSOCIATED WITH TYPE 2 DIABETES MELLITUS, LIMITED TO BREAKDOWN OF SKIN (HCC): Primary | ICD-10-CM

## 2024-07-11 DIAGNOSIS — E11.42 DIABETIC POLYNEUROPATHY ASSOCIATED WITH TYPE 2 DIABETES MELLITUS (HCC): ICD-10-CM

## 2024-07-11 DIAGNOSIS — E11.621 DIABETIC ULCER OF LEFT MIDFOOT ASSOCIATED WITH TYPE 2 DIABETES MELLITUS, LIMITED TO BREAKDOWN OF SKIN (HCC): Primary | ICD-10-CM

## 2024-07-11 DIAGNOSIS — C22.0 HEPATOCELLULAR CARCINOMA (HCC): ICD-10-CM

## 2024-07-11 DIAGNOSIS — R77.2 ELEVATED AFP: ICD-10-CM

## 2024-07-11 DIAGNOSIS — M21.961 ACQUIRED DEFORMITY OF BOTH FEET: ICD-10-CM

## 2024-07-11 DIAGNOSIS — M21.962 METATARSAL DEFORMITY, LEFT: ICD-10-CM

## 2024-07-11 DIAGNOSIS — K70.30 ALCOHOLIC CIRRHOSIS OF LIVER WITHOUT ASCITES (HCC): ICD-10-CM

## 2024-07-11 DIAGNOSIS — I85.00 ESOPHAGEAL VARICES DETERMINED BY ENDOSCOPY (HCC): ICD-10-CM

## 2024-07-11 PROCEDURE — 99213 OFFICE O/P EST LOW 20 MIN: CPT | Performed by: PODIATRIST

## 2024-07-11 PROCEDURE — 74176 CT ABD & PELVIS W/O CONTRAST: CPT

## 2024-07-11 PROCEDURE — 71250 CT THORAX DX C-: CPT

## 2024-07-11 NOTE — PROGRESS NOTES
Assessment/Plan: Preulcer/Lin grade 0 ulcer submetatarsal 2 left foot.  Diabetic neuropathy.  History of ulcer and cellulitis.     Plan.  Chart reviewed.  PCP notes reviewed.  Wound care notes reviewed.  At this time patient is healed.  He is aware of the etiology and nature of his condition.  At this time he is considering surgical intervention to prevent future ulceration.  Patient advised the nature of dislocation of the toe and the nature to fix it.  Pending is indicated.  However patient is very reticent to do this.  We will consider possible other procedures including Weil osteotomy as well as arthroplasty of second and third toe.  At this time patient will continue to use offloading insoles.  He will watch for signs of infection.  Return for follow-up.  He would like to plan surgical intervention for the summer.  Patient also given option of Mata Frederick type procedure however he does not want to have pins in his toes.              Assessment  Diagnoses and all orders for this visit:     Diabetic ulcer of left midfoot associated with type 2 diabetes mellitus, limited to breakdown of skin (HCC)     Diabetic polyneuropathy associated with type 2 diabetes mellitus (HCC)     Acquired deformity of both feet              Subjective: Patient is status post treatment regimen at wound healing center.  He has chronic wound of left foot.  He presents for evaluation.  He is asking about surgical options to prevent against future ulceration.           Allergies   Allergen Reactions    Sulfa Antibiotics Hives    Daptomycin Other (See Comments)       Possibly contributed to ABILIO on 6/2023 admission            Current Medications      Current Outpatient Medications:     benzonatate (TESSALON PERLES) 100 mg capsule, Take 1 capsule (100 mg total) by mouth every 8 (eight) hours, Disp: 21 capsule, Rfl: 0    bumetanide (BUMEX) 1 mg tablet, Take 1 tablet (1 mg total) by mouth daily Do not start before February 29, 2024.,  Disp: 30 tablet, Rfl: 0    fluticasone (FLONASE) 50 mcg/act nasal spray, 1 spray into each nostril daily, Disp: 16 g, Rfl: 0    glimepiride (AMARYL) 4 mg tablet, Take 4 mg by mouth 2 (two) times a day, Disp: , Rfl:     insulin glargine (LANTUS) 100 units/mL subcutaneous injection, Inject 40 Units under the skin daily at bedtime, Disp: , Rfl:     Insulin Pen Needle (BD Pen Needle Nayla 2nd Gen) 32G X 4 MM MISC, For use with insulin pen. Pharmacy may dispense brand covered by insurance., Disp: 100 each, Rfl: 0    Multiple Vitamin (multivitamin) capsule, Take 1 capsule by mouth daily, Disp: 21 capsule, Rfl: 0    pantoprazole (PROTONIX) 40 mg tablet, Take 1 tablet (40 mg total) by mouth daily, Disp: 90 tablet, Rfl: 0    potassium chloride (Klor-Con M20) 20 mEq tablet, Take 1 tablet (20 mEq total) by mouth daily Do not start before February 29, 2024., Disp: 30 tablet, Rfl: 0    tamsulosin (FLOMAX) 0.4 mg, Take 1 capsule (0.4 mg total) by mouth daily with dinner, Disp: 30 capsule, Rfl: 0    traZODone (DESYREL) 50 mg tablet, Take 50 mg by mouth daily at bedtime bedtime, Disp: , Rfl:         Problem List       Patient Active Problem List   Diagnosis    Alcohol abuse    DM2 (diabetes mellitus, type 2) (HCC)    Essential hypertension    ABILIO (acute kidney injury) (HCC)    Cholelithiasis    Cirrhosis, alcoholic (HCC)    MEG (obstructive sleep apnea)    Pancytopenia (HCC)    Insomnia    Elevated troponin    Chest pressure    Diabetic ulcer of left foot associated with type 2 diabetes mellitus (HCC)    Cellulitis    Obesity, morbid (HCC)    Post-traumatic osteoarthritis of left shoulder    Morbid obesity (HCC)    S/P reverse total shoulder arthroplasty, left    Aftercare following left shoulder joint replacement surgery    Ulcer of left foot, limited to breakdown of skin (HCC)    Postoperative infection of surgical wound    Stage 3a chronic kidney disease (HCC)    Murmur    Urinary retention    Pyogenic arthritis of left shoulder  region (HCC)    Perineal abscess    Symptomatic anemia    Volume overload    Chest wall mass    Iron deficiency anemia due to chronic blood loss    Splenomegaly, congestive, chronic                  Subjective  Patient ID: Isael Avendano is a 68 y.o. male.     HPI     The following portions of the patient's history were reviewed and updated as appropriate:      family history includes COPD in his father; Diabetes in his mother; Heart disease in his father and paternal grandmother; Other in his father; Prostate cancer (age of onset: 82) in his paternal grandfather; Supraventricular tachycardia in his mother.       reports that he quit smoking about 31 years ago. His smoking use included cigarettes. He started smoking about 51 years ago. He has a 10 pack-year smoking history. He has never used smokeless tobacco. He reports that he does not currently use alcohol. He reports that he does not use drugs.           Objective:  Patient's shoes and socks removed.     Objective  Foot ExamPhysical Exam        Foot Exam     General  General Appearance: appears stated age and healthy   Orientation: alert and oriented to person, place, and time   Affect: appropriate         Right Foot/Ankle      Inspection and Palpation  Tenderness: none   Swelling: dorsum   Arch: pes cavus  Claw Toes: second toe, fifth toe, fourth toe and third toe  Skin Exam: dry skin;      Neurovascular  Dorsalis pedis: 3+  Posterior tibial: 3+  Saphenous nerve sensation: absent  Tibial nerve sensation: absent  Superficial peroneal nerve sensation: absent  Deep peroneal nerve sensation: absent  Sural nerve sensation: absent        Left Foot/Ankle       Inspection and Palpation  Tenderness: none   Swelling: dorsum   Arch: pes cavus  Claw toes: second toe, fifth toe and fourth toe  Skin Exam: drainage and ulcer;      Neurovascular  Dorsalis pedis: 3+  Posterior tibial: 3+  Saphenous nerve sensation: absent  Tibial nerve sensation: absent  Superficial peroneal nerve  sensation: absent  Deep peroneal nerve sensation: absent  Sural nerve sensation: absent        Physical Exam  Vitals and nursing note reviewed.   Constitutional:       Appearance: Normal appearance.   Cardiovascular:      Rate and Rhythm: Normal rate and regular rhythm.      Pulses: no weak pulses.           Dorsalis pedis pulses are 3+ on the right side and 3+ on the left side.        Posterior tibial pulses are 3+ on the right side and 3+ on the left side.   Feet:      Right foot:      Skin integrity: Dry skin present.      Left foot:      Skin integrity: Ulcer present.   Skin:     Capillary Refill: Capillary refill takes less than 2 seconds.      Comments: Patient has a large Lin grade 0 ulcer submetatarsal 2 left foot.  This is secondary to subluxed second MPJ.  Hyperkeratosis noted.  X-ray demonstrates dislocated second MPJ with subluxed third MPJ.  Second metatarsal appears long.  Significant arthrosis right first MPJ  Neurological:      Mental Status: He is alert.   Psychiatric:         Mood and Affect: Mood normal.         Behavior: Behavior normal.         Thought Content: Thought content normal.         Judgment: Judgment normal.      Patient's shoes and socks removed.     Right Foot/Ankle   Right Foot Inspection  Skin Exam: dry skin.      Sensory   Vibration: absent  Proprioception: absent  Monofilament testing: absent     Vascular  Capillary refills: < 3 seconds  The right DP pulse is 3+. The right PT pulse is 3+.      Right Toe  - Comprehensive Exam  Arch: pes cavus  Claw Toes: second toe, fifth toe, fourth toe and third toe  Swelling: dorsum   Tenderness: none         Left Foot/Ankle  Left Foot Inspection  Skin Exam: ulcer.      Sensory   Vibration: absent  Proprioception: absent  Monofilament testing: absent     Vascular  Capillary refills: < 3 seconds  The left DP pulse is 3+. The left PT pulse is 3+.      Left Toe  - Comprehensive Exam  Arch: pes cavus  Claw toes: second toe, fifth toe and fourth  toe  Swelling: dorsum   Tenderness: none         Assign Risk Category  Deformity present  Loss of protective sensation  No weak pulses  Risk: 2

## 2024-07-11 NOTE — TELEPHONE ENCOUNTER
Caller: Isael Avendano    Doctor and/or Office: Dr. Lucas    #: [phone]    Escalation: Appointment Patient has an appt in Aug, but was just released from wound care with Dr. Clayton. He is requesting a forced appt this week or next to have his wound looked at. Please call and advise if this is possible. Thank jaylon khan

## 2024-07-13 ENCOUNTER — HOSPITAL ENCOUNTER (OUTPATIENT)
Dept: MRI IMAGING | Facility: HOSPITAL | Age: 69
Discharge: HOME | End: 2024-07-13
Payer: MEDICARE

## 2024-07-13 DIAGNOSIS — K70.30 ALCOHOLIC CIRRHOSIS OF LIVER WITHOUT ASCITES (HCC): ICD-10-CM

## 2024-07-13 DIAGNOSIS — R77.2 ELEVATED AFP: ICD-10-CM

## 2024-07-13 DIAGNOSIS — C22.0 HEPATOCELLULAR CARCINOMA (HCC): ICD-10-CM

## 2024-07-13 DIAGNOSIS — I85.00 ESOPHAGEAL VARICES DETERMINED BY ENDOSCOPY (HCC): ICD-10-CM

## 2024-07-13 PROCEDURE — 74183 MRI ABD W/O CNTR FLWD CNTR: CPT

## 2024-07-13 PROCEDURE — A9585 GADOBUTROL INJECTION: HCPCS | Performed by: STUDENT IN AN ORGANIZED HEALTH CARE EDUCATION/TRAINING PROGRAM

## 2024-07-13 RX ORDER — GADOBUTROL 604.72 MG/ML
10 INJECTION INTRAVENOUS
Status: COMPLETED | OUTPATIENT
Start: 2024-07-13 | End: 2024-07-13

## 2024-07-13 RX ADMIN — GADOBUTROL 10 ML: 604.72 INJECTION INTRAVENOUS at 17:50

## 2024-07-16 ENCOUNTER — HOSPITAL ENCOUNTER (OUTPATIENT)
Dept: NON INVASIVE DIAGNOSTICS | Facility: HOSPITAL | Age: 69
Discharge: HOME | End: 2024-07-16
Payer: MEDICARE

## 2024-07-16 VITALS
DIASTOLIC BLOOD PRESSURE: 58 MMHG | BODY MASS INDEX: 33.02 KG/M2 | HEART RATE: 79 BPM | SYSTOLIC BLOOD PRESSURE: 123 MMHG | WEIGHT: 235.89 LBS | HEIGHT: 71 IN

## 2024-07-16 DIAGNOSIS — R77.2 ELEVATED AFP: ICD-10-CM

## 2024-07-16 DIAGNOSIS — C22.0 HEPATOCELLULAR CARCINOMA (HCC): ICD-10-CM

## 2024-07-16 DIAGNOSIS — R93.1 ABNORMAL FINDINGS ON DIAGNOSTIC IMAGING OF HEART AND CORONARY CIRCULATION: ICD-10-CM

## 2024-07-16 DIAGNOSIS — I85.00 ESOPHAGEAL VARICES DETERMINED BY ENDOSCOPY (HCC): ICD-10-CM

## 2024-07-16 DIAGNOSIS — K70.30 ALCOHOLIC CIRRHOSIS OF LIVER WITHOUT ASCITES (HCC): ICD-10-CM

## 2024-07-16 LAB
AORTIC ROOT: 3.6 CM
AORTIC VALVE MEAN VELOCITY: 13 M/S
APICAL FOUR CHAMBER EJECTION FRACTION: 56 %
ASCENDING AORTA: 4.2 CM
AV AREA BY CONTINUOUS VTI: 2.3 CM2
AV AREA PEAK VELOCITY: 2.3 CM2
AV LVOT MEAN GRADIENT: 5 MMHG
AV LVOT PEAK GRADIENT: 9 MMHG
AV MEAN GRADIENT: 8 MMHG
AV PEAK GRADIENT: 17 MMHG
AV VALVE AREA: 2.31 CM2
AV VELOCITY RATIO: 0.75
BSA FOR ECHO PROCEDURE: 2.26 M2
CHEST PAIN STATEMENT: NORMAL
CHEST PAIN STATEMENT: NORMAL
DOP CALC AO PEAK VEL: 2.05 M/S
DOP CALC AO VTI: 47.38 CM
DOP CALC LVOT AREA: 3.14 CM2
DOP CALC LVOT CARDIAC INDEX: 3.86 L/MIN/M2
DOP CALC LVOT CARDIAC OUTPUT: 8.72 L/MIN
DOP CALC LVOT DIAMETER: 2 CM
DOP CALC LVOT PEAK VEL VTI: 34.86 CM
DOP CALC LVOT PEAK VEL: 1.53 M/S
DOP CALC LVOT STROKE INDEX: 50 ML/M2
DOP CALC LVOT STROKE VOLUME: 109.46
DOP CALC MV VTI: 52.74 CM
E WAVE DECELERATION TIME: 322 MS
E/A RATIO: 0.86
FRACTIONAL SHORTENING: 34 (ref 28–44)
INTERVENTRICULAR SEPTUM IN DIASTOLE (PARASTERNAL SHORT AXIS VIEW): 1.5 CM
INTERVENTRICULAR SEPTUM: 1.5 CM (ref 0.6–1.1)
IVC: 22 MM
LAAS-AP2: 38.8 CM2
LAAS-AP4: 32.8 CM2
LEFT ATRIUM SIZE: 5 CM
LEFT ATRIUM VOLUME (MOD BIPLANE): 137 ML
LEFT ATRIUM VOLUME INDEX (MOD BIPLANE): 60.6 ML/M2
LEFT INTERNAL DIMENSION IN SYSTOLE: 3.1 CM (ref 2.1–4)
LEFT VENTRICULAR INTERNAL DIMENSION IN DIASTOLE: 4.7 CM (ref 3.5–6)
LEFT VENTRICULAR POSTERIOR WALL IN END DIASTOLE: 1.3 CM
LEFT VENTRICULAR STROKE VOLUME: 63 ML
LVSV (TEICH): 63 ML
MAX DIASTOLIC BP: 58 MMHG
MAX DIASTOLIC BP: 58 MMHG
MAX HR PERCENT: 86 %
MAX HR: 129 BPM
MAX PREDICTED HEART RATE: 150 BPM
MAX PREDICTED HEART RATE: 150 BPM
MV E'TISSUE VEL-SEP: 6 CM/S
MV MEAN GRADIENT: 6 MMHG
MV PEAK A VEL: 1.68 M/S
MV PEAK E VEL: 145 CM/S
MV PEAK GRADIENT: 10 MMHG
MV STENOSIS PRESSURE HALF TIME: 93 MS
MV VALVE AREA BY CONTINUITY EQUATION: 2.08 CM2
MV VALVE AREA P 1/2 METHOD: 2.37
PROTOCOL NAME: NORMAL
PROTOCOL NAME: NORMAL
RA PRESSURE ESTIMATED: 8 MMHG
RATE PRESSURE PRODUCT: 339
REASON FOR TERMINATION: NORMAL
REASON FOR TERMINATION: NORMAL
RIGHT ATRIAL 2D VOLUME: 53 ML
RIGHT ATRIUM AREA SYSTOLE A4C: 20.4 CM2
RIGHT VENTRICLE ID DIMENSION: 4.6 CM
SL CV LEFT ATRIUM LENGTH A2C: 7.3 CM
SL CV LV EF: 60
SL CV LV EF: 65
SL CV PED ECHO LEFT VENTRICLE DIASTOLIC VOLUME (MOD BIPLANE) 2D: 100 ML
SL CV PED ECHO LEFT VENTRICLE SYSTOLIC VOLUME (MOD BIPLANE) 2D: 37 ML
SL CV STRESS RECOVERY BP: NORMAL MMHG
SL CV STRESS RECOVERY HR: 96 BPM
SL CV STRESS RECOVERY O2 SAT: 99 %
STRESS ANGINA INDEX: 0
STRESS BASELINE BP: NORMAL MMHG
STRESS BASELINE HR: 65 BPM
STRESS O2 SAT REST: 98 %
STRESS PEAK HR: 113 BPM
STRESS POST ESTIMATED WORKLOAD: 1 METS
STRESS POST EXERCISE DUR MIN: 9 MIN
STRESS POST EXERCISE DUR SEC: 1 SEC
STRESS POST O2 SAT PEAK: 99 %
STRESS POST PEAK BP: 3 MMHG
STRESS POST PEAK HR: 127 BPM
STRESS POST PEAK HR: 127 BPM
STRESS POST PEAK SYSTOLIC BP: 158 MMHG
STRESS POST PEAK SYSTOLIC BP: 158 MMHG
TARGET HR FORMULA: NORMAL
TARGET HR FORMULA: NORMAL
TEST INDICATION: NORMAL
TEST INDICATION: NORMAL
TRICUSPID ANNULAR PLANE SYSTOLIC EXCURSION: 2.3 CM

## 2024-07-16 PROCEDURE — 93306 TTE W/DOPPLER COMPLETE: CPT | Performed by: INTERNAL MEDICINE

## 2024-07-16 PROCEDURE — 93350 STRESS TTE ONLY: CPT | Performed by: INTERNAL MEDICINE

## 2024-07-16 PROCEDURE — 93306 TTE W/DOPPLER COMPLETE: CPT

## 2024-07-16 PROCEDURE — 93350 STRESS TTE ONLY: CPT

## 2024-07-16 RX ORDER — DOBUTAMINE HYDROCHLORIDE 200 MG/100ML
10 INJECTION INTRAVENOUS CONTINUOUS
Status: DISCONTINUED | OUTPATIENT
Start: 2024-07-16 | End: 2024-07-17 | Stop reason: HOSPADM

## 2024-07-16 RX ADMIN — DOBUTAMINE HYDROCHLORIDE 10 MCG/KG/MIN: 200 INJECTION INTRAVENOUS at 10:08

## 2024-07-19 ENCOUNTER — APPOINTMENT (OUTPATIENT)
Dept: LAB | Facility: CLINIC | Age: 69
End: 2024-07-19
Payer: MEDICARE

## 2024-07-19 DIAGNOSIS — E87.6 HYPOKALEMIA: ICD-10-CM

## 2024-07-19 LAB — POTASSIUM SERPL-SCNC: 4.5 MMOL/L (ref 3.5–5.3)

## 2024-07-19 PROCEDURE — 84132 ASSAY OF SERUM POTASSIUM: CPT

## 2024-07-19 PROCEDURE — 36415 COLL VENOUS BLD VENIPUNCTURE: CPT

## 2024-07-26 ENCOUNTER — OFFICE VISIT (OUTPATIENT)
Age: 69
End: 2024-07-26
Payer: MEDICARE

## 2024-07-26 VITALS
HEART RATE: 88 BPM | RESPIRATION RATE: 17 BRPM | HEIGHT: 71 IN | DIASTOLIC BLOOD PRESSURE: 68 MMHG | BODY MASS INDEX: 32.9 KG/M2 | WEIGHT: 235 LBS | SYSTOLIC BLOOD PRESSURE: 118 MMHG

## 2024-07-26 DIAGNOSIS — M21.962 ACQUIRED DEFORMITY OF BOTH FEET: ICD-10-CM

## 2024-07-26 DIAGNOSIS — E11.42 DIABETIC POLYNEUROPATHY ASSOCIATED WITH TYPE 2 DIABETES MELLITUS (HCC): ICD-10-CM

## 2024-07-26 DIAGNOSIS — M21.962 METATARSAL DEFORMITY, LEFT: ICD-10-CM

## 2024-07-26 DIAGNOSIS — E11.621 DIABETIC ULCER OF LEFT MIDFOOT ASSOCIATED WITH TYPE 2 DIABETES MELLITUS, LIMITED TO BREAKDOWN OF SKIN (HCC): Primary | ICD-10-CM

## 2024-07-26 DIAGNOSIS — M21.961 ACQUIRED DEFORMITY OF BOTH FEET: ICD-10-CM

## 2024-07-26 DIAGNOSIS — L97.421 DIABETIC ULCER OF LEFT MIDFOOT ASSOCIATED WITH TYPE 2 DIABETES MELLITUS, LIMITED TO BREAKDOWN OF SKIN (HCC): Primary | ICD-10-CM

## 2024-07-26 PROCEDURE — 99213 OFFICE O/P EST LOW 20 MIN: CPT | Performed by: PODIATRIST

## 2024-07-26 NOTE — PROGRESS NOTES
Assessment/Plan: Preulcer/Lin grade 0 ulcer submetatarsal 2 left foot.  Diabetic neuropathy.  History of ulcer and cellulitis.     Plan.  Chart reviewed.  PCP notes reviewed.  Wound care notes reviewed.  At this time patient is healed.  He is aware of the etiology and nature of his condition.  At this time he is considering surgical intervention to prevent future ulceration.  Patient advised the nature of dislocation of the toe and the nature to fix it.  Pending is indicated.  However patient is very reticent to do this.  We will consider possible other procedures including Weil osteotomy as well as arthroplasty of second and third toe.  At this time patient will continue to use offloading insoles.  He will watch for signs of infection.  Return for follow-up.  He would like to plan surgical intervention after treatment for hepatic CA.  Patient also given option of Mata Frederick type procedure however he does not want to have pins in his toes.              Assessment  Diagnoses and all orders for this visit:     Diabetic ulcer of left midfoot associated with type 2 diabetes mellitus, limited to breakdown of skin (HCC)     Diabetic polyneuropathy associated with type 2 diabetes mellitus (HCC)     Acquired deformity of both feet              Subjective: Patient is status post treatment regimen at wound healing center.  He has chronic wound of left foot.  He presents for evaluation.  He is asking about surgical options to prevent against future ulceration.               Allergies   Allergen Reactions    Sulfa Antibiotics Hives    Daptomycin Other (See Comments)       Possibly contributed to ABILIO on 6/2023 admission             Current Medications      Current Outpatient Medications:     benzonatate (TESSALON PERLES) 100 mg capsule, Take 1 capsule (100 mg total) by mouth every 8 (eight) hours, Disp: 21 capsule, Rfl: 0    bumetanide (BUMEX) 1 mg tablet, Take 1 tablet (1 mg total) by mouth daily Do not start before  February 29, 2024., Disp: 30 tablet, Rfl: 0    fluticasone (FLONASE) 50 mcg/act nasal spray, 1 spray into each nostril daily, Disp: 16 g, Rfl: 0    glimepiride (AMARYL) 4 mg tablet, Take 4 mg by mouth 2 (two) times a day, Disp: , Rfl:     insulin glargine (LANTUS) 100 units/mL subcutaneous injection, Inject 40 Units under the skin daily at bedtime, Disp: , Rfl:     Insulin Pen Needle (BD Pen Needle Nayla 2nd Gen) 32G X 4 MM MISC, For use with insulin pen. Pharmacy may dispense brand covered by insurance., Disp: 100 each, Rfl: 0    Multiple Vitamin (multivitamin) capsule, Take 1 capsule by mouth daily, Disp: 21 capsule, Rfl: 0    pantoprazole (PROTONIX) 40 mg tablet, Take 1 tablet (40 mg total) by mouth daily, Disp: 90 tablet, Rfl: 0    potassium chloride (Klor-Con M20) 20 mEq tablet, Take 1 tablet (20 mEq total) by mouth daily Do not start before February 29, 2024., Disp: 30 tablet, Rfl: 0    tamsulosin (FLOMAX) 0.4 mg, Take 1 capsule (0.4 mg total) by mouth daily with dinner, Disp: 30 capsule, Rfl: 0    traZODone (DESYREL) 50 mg tablet, Take 50 mg by mouth daily at bedtime bedtime, Disp: , Rfl:          Problem List         Patient Active Problem List   Diagnosis    Alcohol abuse    DM2 (diabetes mellitus, type 2) (HCC)    Essential hypertension    ABILIO (acute kidney injury) (HCC)    Cholelithiasis    Cirrhosis, alcoholic (HCC)    MEG (obstructive sleep apnea)    Pancytopenia (HCC)    Insomnia    Elevated troponin    Chest pressure    Diabetic ulcer of left foot associated with type 2 diabetes mellitus (HCC)    Cellulitis    Obesity, morbid (HCC)    Post-traumatic osteoarthritis of left shoulder    Morbid obesity (HCC)    S/P reverse total shoulder arthroplasty, left    Aftercare following left shoulder joint replacement surgery    Ulcer of left foot, limited to breakdown of skin (HCC)    Postoperative infection of surgical wound    Stage 3a chronic kidney disease (HCC)    Murmur    Urinary retention    Pyogenic  arthritis of left shoulder region (HCC)    Perineal abscess    Symptomatic anemia    Volume overload    Chest wall mass    Iron deficiency anemia due to chronic blood loss    Splenomegaly, congestive, chronic                  Subjective  Patient ID: Isael Avendano is a 68 y.o. male.     HPI     The following portions of the patient's history were reviewed and updated as appropriate:      family history includes COPD in his father; Diabetes in his mother; Heart disease in his father and paternal grandmother; Other in his father; Prostate cancer (age of onset: 82) in his paternal grandfather; Supraventricular tachycardia in his mother.       reports that he quit smoking about 31 years ago. His smoking use included cigarettes. He started smoking about 51 years ago. He has a 10 pack-year smoking history. He has never used smokeless tobacco. He reports that he does not currently use alcohol. He reports that he does not use drugs.           Objective:  Patient's shoes and socks removed.     Objective  Foot ExamPhysical Exam        Foot Exam     General  General Appearance: appears stated age and healthy   Orientation: alert and oriented to person, place, and time   Affect: appropriate         Right Foot/Ankle      Inspection and Palpation  Tenderness: none   Swelling: dorsum   Arch: pes cavus  Claw Toes: second toe, fifth toe, fourth toe and third toe  Skin Exam: dry skin;      Neurovascular  Dorsalis pedis: 3+  Posterior tibial: 3+  Saphenous nerve sensation: absent  Tibial nerve sensation: absent  Superficial peroneal nerve sensation: absent  Deep peroneal nerve sensation: absent  Sural nerve sensation: absent        Left Foot/Ankle       Inspection and Palpation  Tenderness: none   Swelling: dorsum   Arch: pes cavus  Claw toes: second toe, fifth toe and fourth toe  Skin Exam: drainage and ulcer;      Neurovascular  Dorsalis pedis: 3+  Posterior tibial: 3+  Saphenous nerve sensation: absent  Tibial nerve sensation:  absent  Superficial peroneal nerve sensation: absent  Deep peroneal nerve sensation: absent  Sural nerve sensation: absent        Physical Exam  Vitals and nursing note reviewed.   Constitutional:       Appearance: Normal appearance.   Cardiovascular:      Rate and Rhythm: Normal rate and regular rhythm.      Pulses: no weak pulses.           Dorsalis pedis pulses are 3+ on the right side and 3+ on the left side.        Posterior tibial pulses are 3+ on the right side and 3+ on the left side.   Feet:      Right foot:      Skin integrity: Dry skin present.      Left foot:      Skin integrity: Ulcer present.   Skin:     Capillary Refill: Capillary refill takes less than 2 seconds.      Comments: Patient has a large Lin grade 0 ulcer submetatarsal 2 left foot.  This is secondary to subluxed second MPJ.  Hyperkeratosis noted.  X-ray demonstrates dislocated second MPJ with subluxed third MPJ.  Second metatarsal appears long.  Significant arthrosis right first MPJ  Neurological:      Mental Status: He is alert.   Psychiatric:         Mood and Affect: Mood normal.         Behavior: Behavior normal.         Thought Content: Thought content normal.         Judgment: Judgment normal.      Patient's shoes and socks removed.     Right Foot/Ankle   Right Foot Inspection  Skin Exam: dry skin.      Sensory   Vibration: absent  Proprioception: absent  Monofilament testing: absent     Vascular  Capillary refills: < 3 seconds  The right DP pulse is 3+. The right PT pulse is 3+.      Right Toe  - Comprehensive Exam  Arch: pes cavus  Claw Toes: second toe, fifth toe, fourth toe and third toe  Swelling: dorsum   Tenderness: none         Left Foot/Ankle  Left Foot Inspection  Skin Exam: ulcer.      Sensory   Vibration: absent  Proprioception: absent  Monofilament testing: absent     Vascular  Capillary refills: < 3 seconds  The left DP pulse is 3+. The left PT pulse is 3+.      Left Toe  - Comprehensive Exam  Arch: pes cavus  Claw  toes: second toe, fifth toe and fourth toe  Swelling: dorsum   Tenderness: none         Assign Risk Category  Deformity present  Loss of protective sensation  No weak pulses  Risk: 2

## 2024-07-26 NOTE — PATIENT INSTRUCTIONS
Patient refusing LP at this time. Explained to patient how the procedure is performed, patient reports he does not feel comfortable with LP. Thank you for coming to your visit today  As we discussed you kidney function is back to normal, your creatinine is 1 12mg/dL  Please follow the recommendations below       Recommend low sodium (salt) food    Avoid nonsteroidal anti-inflammatory drugs such as Naprosyn, ibuprofen, Aleve, Advil, Celebrex, Meloxicam (Mobic) etc   You can use Tylenol as needed if you do not have any liver condition to be concerned about    Try to exercise at least 30 minutes 3 days a week to begin with with an ultimate goal of 5 days a week for at least 30 minutes    Try to lose 5-10 lb by your next visit  Blood Pressure Measurement Instructions:   Take the morning readings before any medications and when yo are relaxed for several minutes   Take the evening readings between 7pm and 10pm at least 30 minutes before or after dinner/eating/coffee or alcohol intake and certainly before any blood pressure medications,   Please include heart rate with your blood pressure readings   When taking standing readings, keep your arm supported at heart level and not dangling  Make sure you are sitting with your back supported and feet on the ground and do not cross your legs or feet  Make sure you have not taken any coffee or caffeine products or exercised or smoke cigarettes at least 30 minutes before taking your blood pressure      You can continue follow up with your PCP    Tera Hernández MD  Nephrology Attending Procedure team saw patient at bedside. Patient refusing LP at this time. Explained to patient how the procedure is performed, patient reports he does not feel comfortable with LP.

## 2024-08-07 ENCOUNTER — HOSPITAL ENCOUNTER (OUTPATIENT)
Dept: RADIOLOGY | Facility: HOSPITAL | Age: 69
Discharge: HOME | End: 2024-08-07
Payer: MEDICARE

## 2024-08-07 DIAGNOSIS — C22.0 LIVER CELL CARCINOMA (HCC): ICD-10-CM

## 2024-08-07 PROCEDURE — 74174 CTA ABD&PLVS W/CONTRAST: CPT

## 2024-08-07 RX ADMIN — IOHEXOL 75 ML: 350 INJECTION, SOLUTION INTRAVENOUS at 14:33

## 2024-08-09 ENCOUNTER — TELEPHONE (OUTPATIENT)
Dept: GASTROENTEROLOGY | Facility: AMBULARY SURGERY CENTER | Age: 69
End: 2024-08-09

## 2024-08-13 ENCOUNTER — TELEPHONE (OUTPATIENT)
Age: 69
End: 2024-08-13

## 2024-08-13 NOTE — TELEPHONE ENCOUNTER
Call from patient's wife, Kaylah, stating that Isael is feeling the same as he did prior to having the iron infusions; he is very fatigued, cannot sleep and has an upset stomach. He is having an endoscopy on Thursday. She is asking if he needs more iron infusions. They see Dr. Martins the end of September. Will speak with Dr. Martins.

## 2024-08-14 ENCOUNTER — OFFICE VISIT (OUTPATIENT)
Age: 69
End: 2024-08-14
Payer: MEDICARE

## 2024-08-14 VITALS
WEIGHT: 235 LBS | HEIGHT: 71 IN | RESPIRATION RATE: 16 BRPM | SYSTOLIC BLOOD PRESSURE: 115 MMHG | DIASTOLIC BLOOD PRESSURE: 68 MMHG | BODY MASS INDEX: 32.9 KG/M2

## 2024-08-14 DIAGNOSIS — M21.962 ACQUIRED DEFORMITY OF BOTH FEET: ICD-10-CM

## 2024-08-14 DIAGNOSIS — M21.961 ACQUIRED DEFORMITY OF BOTH FEET: ICD-10-CM

## 2024-08-14 DIAGNOSIS — D50.0 IRON DEFICIENCY ANEMIA DUE TO CHRONIC BLOOD LOSS: Primary | ICD-10-CM

## 2024-08-14 DIAGNOSIS — E11.42 DIABETIC POLYNEUROPATHY ASSOCIATED WITH TYPE 2 DIABETES MELLITUS (HCC): ICD-10-CM

## 2024-08-14 DIAGNOSIS — E11.621 DIABETIC ULCER OF LEFT MIDFOOT ASSOCIATED WITH TYPE 2 DIABETES MELLITUS, LIMITED TO BREAKDOWN OF SKIN (HCC): Primary | ICD-10-CM

## 2024-08-14 DIAGNOSIS — M21.962 METATARSAL DEFORMITY, LEFT: ICD-10-CM

## 2024-08-14 DIAGNOSIS — L97.421 DIABETIC ULCER OF LEFT MIDFOOT ASSOCIATED WITH TYPE 2 DIABETES MELLITUS, LIMITED TO BREAKDOWN OF SKIN (HCC): Primary | ICD-10-CM

## 2024-08-14 PROCEDURE — 99213 OFFICE O/P EST LOW 20 MIN: CPT | Performed by: PODIATRIST

## 2024-08-14 NOTE — PROGRESS NOTES
Assessment/Plan: Preulcer/Lin grade 0 ulcer submetatarsal 2 left foot.  Diabetic neuropathy.  History of ulcer and cellulitis.     Plan.  Chart reviewed.  PCP notes reviewed.  Wound care notes reviewed.  At this time patient is healed.  He is aware of the etiology and nature of his condition.  At this time he is considering surgical intervention to prevent future ulceration.  Patient advised the nature of dislocation of the toe and the nature to fix it.  Pending is indicated.  However patient is very reticent to do this.  We will consider possible other procedures including Weil osteotomy as well as arthroplasty of second and third toe.  At this time patient will continue to use offloading insoles.  He will watch for signs of infection.  Return for follow-up.  He would like to plan surgical intervention after treatment for hepatic CA.  Patient also given option of Mata Frederick type procedure however he does not want to have pins in his toes.  Patient might also benefit from proximal phalanx base resection.              Assessment  Diagnoses and all orders for this visit:     Diabetic ulcer of left midfoot associated with type 2 diabetes mellitus, limited to breakdown of skin (HCC)     Diabetic polyneuropathy associated with type 2 diabetes mellitus (HCC)     Acquired deformity of both feet              Subjective: Patient is status post treatment regimen at wound healing center.  He has chronic wound of left foot.  He presents for evaluation.  He is asking about surgical options to prevent against future ulceration.               Allergies   Allergen Reactions    Sulfa Antibiotics Hives    Daptomycin Other (See Comments)       Possibly contributed to ABILIO on 6/2023 admission             Current Medications      Current Outpatient Medications:     benzonatate (TESSALON PERLES) 100 mg capsule, Take 1 capsule (100 mg total) by mouth every 8 (eight) hours, Disp: 21 capsule, Rfl: 0    bumetanide (BUMEX) 1 mg tablet,  Take 1 tablet (1 mg total) by mouth daily Do not start before February 29, 2024., Disp: 30 tablet, Rfl: 0    fluticasone (FLONASE) 50 mcg/act nasal spray, 1 spray into each nostril daily, Disp: 16 g, Rfl: 0    glimepiride (AMARYL) 4 mg tablet, Take 4 mg by mouth 2 (two) times a day, Disp: , Rfl:     insulin glargine (LANTUS) 100 units/mL subcutaneous injection, Inject 40 Units under the skin daily at bedtime, Disp: , Rfl:     Insulin Pen Needle (BD Pen Needle Nayla 2nd Gen) 32G X 4 MM MISC, For use with insulin pen. Pharmacy may dispense brand covered by insurance., Disp: 100 each, Rfl: 0    Multiple Vitamin (multivitamin) capsule, Take 1 capsule by mouth daily, Disp: 21 capsule, Rfl: 0    pantoprazole (PROTONIX) 40 mg tablet, Take 1 tablet (40 mg total) by mouth daily, Disp: 90 tablet, Rfl: 0    potassium chloride (Klor-Con M20) 20 mEq tablet, Take 1 tablet (20 mEq total) by mouth daily Do not start before February 29, 2024., Disp: 30 tablet, Rfl: 0    tamsulosin (FLOMAX) 0.4 mg, Take 1 capsule (0.4 mg total) by mouth daily with dinner, Disp: 30 capsule, Rfl: 0    traZODone (DESYREL) 50 mg tablet, Take 50 mg by mouth daily at bedtime bedtime, Disp: , Rfl:          Problem List         Patient Active Problem List   Diagnosis    Alcohol abuse    DM2 (diabetes mellitus, type 2) (HCC)    Essential hypertension    ABILIO (acute kidney injury) (HCC)    Cholelithiasis    Cirrhosis, alcoholic (HCC)    MEG (obstructive sleep apnea)    Pancytopenia (HCC)    Insomnia    Elevated troponin    Chest pressure    Diabetic ulcer of left foot associated with type 2 diabetes mellitus (HCC)    Cellulitis    Obesity, morbid (HCC)    Post-traumatic osteoarthritis of left shoulder    Morbid obesity (HCC)    S/P reverse total shoulder arthroplasty, left    Aftercare following left shoulder joint replacement surgery    Ulcer of left foot, limited to breakdown of skin (HCC)    Postoperative infection of surgical wound    Stage 3a chronic kidney  disease (HCC)    Murmur    Urinary retention    Pyogenic arthritis of left shoulder region (HCC)    Perineal abscess    Symptomatic anemia    Volume overload    Chest wall mass    Iron deficiency anemia due to chronic blood loss    Splenomegaly, congestive, chronic                  Subjective  Patient ID: Isael Avendano is a 68 y.o. male.     HPI     The following portions of the patient's history were reviewed and updated as appropriate:      family history includes COPD in his father; Diabetes in his mother; Heart disease in his father and paternal grandmother; Other in his father; Prostate cancer (age of onset: 82) in his paternal grandfather; Supraventricular tachycardia in his mother.       reports that he quit smoking about 31 years ago. His smoking use included cigarettes. He started smoking about 51 years ago. He has a 10 pack-year smoking history. He has never used smokeless tobacco. He reports that he does not currently use alcohol. He reports that he does not use drugs.           Objective:  Patient's shoes and socks removed.          Foot Exam     General  General Appearance: appears stated age and healthy   Orientation: alert and oriented to person, place, and time   Affect: appropriate         Right Foot/Ankle      Inspection and Palpation  Tenderness: none   Swelling: dorsum   Arch: pes cavus  Claw Toes: second toe, fifth toe, fourth toe and third toe  Skin Exam: dry skin;      Neurovascular  Dorsalis pedis: 3+  Posterior tibial: 3+  Saphenous nerve sensation: absent  Tibial nerve sensation: absent  Superficial peroneal nerve sensation: absent  Deep peroneal nerve sensation: absent  Sural nerve sensation: absent        Left Foot/Ankle       Inspection and Palpation  Tenderness: none   Swelling: dorsum   Arch: pes cavus  Claw toes: second toe, fifth toe and fourth toe  Skin Exam: drainage and ulcer;      Neurovascular  Dorsalis pedis: 3+  Posterior tibial: 3+  Saphenous nerve sensation: absent  Tibial  nerve sensation: absent  Superficial peroneal nerve sensation: absent  Deep peroneal nerve sensation: absent  Sural nerve sensation: absent        Physical Exam  Vitals and nursing note reviewed.   Constitutional:       Appearance: Normal appearance.   Cardiovascular:      Rate and Rhythm: Normal rate and regular rhythm.      Pulses: no weak pulses.           Dorsalis pedis pulses are 3+ on the right side and 3+ on the left side.        Posterior tibial pulses are 3+ on the right side and 3+ on the left side.   Feet:      Right foot:      Skin integrity: Dry skin present.      Left foot:      Skin integrity: Ulcer present.   Skin:     Capillary Refill: Capillary refill takes less than 2 seconds.      Comments: Patient has a large Lin grade 0 ulcer submetatarsal 2 left foot.  This is secondary to subluxed second MPJ.  Hyperkeratosis noted.  X-ray demonstrates dislocated second MPJ with subluxed third MPJ.  Second metatarsal appears long.  Significant arthrosis right first MPJ  Neurological:      Mental Status: He is alert.   Psychiatric:         Mood and Affect: Mood normal.         Behavior: Behavior normal.         Thought Content: Thought content normal.         Judgment: Judgment normal.      Patient's shoes and socks removed.     Right Foot/Ankle   Right Foot Inspection  Skin Exam: dry skin.      Sensory   Vibration: absent  Proprioception: absent  Monofilament testing: absent     Vascular  Capillary refills: < 3 seconds  The right DP pulse is 3+. The right PT pulse is 3+.      Right Toe  - Comprehensive Exam  Arch: pes cavus  Claw Toes: second toe, fifth toe, fourth toe and third toe  Swelling: dorsum   Tenderness: none         Left Foot/Ankle  Left Foot Inspection  Skin Exam: ulcer.      Sensory   Vibration: absent  Proprioception: absent  Monofilament testing: absent     Vascular  Capillary refills: < 3 seconds  The left DP pulse is 3+. The left PT pulse is 3+.      Left Toe  - Comprehensive Exam  Arch:  pes cavus  Claw toes: second toe, fifth toe and fourth toe  Swelling: dorsum   Tenderness: none         Assign Risk Category  Deformity present  Loss of protective sensation  No weak pulses  Risk: 2

## 2024-08-14 NOTE — TELEPHONE ENCOUNTER
Reviewed with Dr Martins. CBC and iron panel ordered for pt to have done. Will discuss need for further iron infusions once we have those results.

## 2024-08-15 ENCOUNTER — HOSPITAL ENCOUNTER (OUTPATIENT)
Dept: GASTROENTEROLOGY | Facility: HOSPITAL | Age: 69
Setting detail: OUTPATIENT SURGERY
End: 2024-08-15
Attending: INTERNAL MEDICINE
Payer: MEDICARE

## 2024-08-15 ENCOUNTER — ANESTHESIA (OUTPATIENT)
Dept: GASTROENTEROLOGY | Facility: HOSPITAL | Age: 69
End: 2024-08-15

## 2024-08-15 ENCOUNTER — ANESTHESIA EVENT (OUTPATIENT)
Dept: GASTROENTEROLOGY | Facility: HOSPITAL | Age: 69
End: 2024-08-15

## 2024-08-15 VITALS
SYSTOLIC BLOOD PRESSURE: 121 MMHG | OXYGEN SATURATION: 99 % | WEIGHT: 225 LBS | RESPIRATION RATE: 18 BRPM | TEMPERATURE: 97.8 F | HEIGHT: 71 IN | BODY MASS INDEX: 31.5 KG/M2 | DIASTOLIC BLOOD PRESSURE: 64 MMHG | HEART RATE: 81 BPM

## 2024-08-15 DIAGNOSIS — K29.70 GASTRITIS WITHOUT BLEEDING, UNSPECIFIED CHRONICITY, UNSPECIFIED GASTRITIS TYPE: ICD-10-CM

## 2024-08-15 LAB — GLUCOSE SERPL-MCNC: 159 MG/DL (ref 65–140)

## 2024-08-15 PROCEDURE — 43235 EGD DIAGNOSTIC BRUSH WASH: CPT | Performed by: INTERNAL MEDICINE

## 2024-08-15 PROCEDURE — 82948 REAGENT STRIP/BLOOD GLUCOSE: CPT

## 2024-08-15 RX ORDER — PROPOFOL 10 MG/ML
INJECTION, EMULSION INTRAVENOUS AS NEEDED
Status: DISCONTINUED | OUTPATIENT
Start: 2024-08-15 | End: 2024-08-15

## 2024-08-15 RX ORDER — SODIUM CHLORIDE, SODIUM LACTATE, POTASSIUM CHLORIDE, CALCIUM CHLORIDE 600; 310; 30; 20 MG/100ML; MG/100ML; MG/100ML; MG/100ML
INJECTION, SOLUTION INTRAVENOUS CONTINUOUS PRN
Status: DISCONTINUED | OUTPATIENT
Start: 2024-08-15 | End: 2024-08-15

## 2024-08-15 RX ORDER — LIDOCAINE HYDROCHLORIDE 20 MG/ML
INJECTION, SOLUTION EPIDURAL; INFILTRATION; INTRACAUDAL; PERINEURAL AS NEEDED
Status: DISCONTINUED | OUTPATIENT
Start: 2024-08-15 | End: 2024-08-15

## 2024-08-15 RX ADMIN — PROPOFOL 80 MG: 10 INJECTION, EMULSION INTRAVENOUS at 11:29

## 2024-08-15 RX ADMIN — LIDOCAINE HYDROCHLORIDE 100 MG: 20 INJECTION, SOLUTION EPIDURAL; INFILTRATION; INTRACAUDAL at 11:28

## 2024-08-15 RX ADMIN — PROPOFOL 30 MG: 10 INJECTION, EMULSION INTRAVENOUS at 11:31

## 2024-08-15 RX ADMIN — SODIUM CHLORIDE, SODIUM LACTATE, POTASSIUM CHLORIDE, AND CALCIUM CHLORIDE: .6; .31; .03; .02 INJECTION, SOLUTION INTRAVENOUS at 11:23

## 2024-08-15 RX ADMIN — PROPOFOL 30 MG: 10 INJECTION, EMULSION INTRAVENOUS at 11:34

## 2024-08-15 NOTE — ANESTHESIA POSTPROCEDURE EVALUATION
Post-Op Assessment Note    CV Status:  Stable  Pain Score: 0    Pain management: adequate       Mental Status:  Alert and awake   Hydration Status:  Stable   PONV Controlled:  None   Airway Patency:  Patent     Post Op Vitals Reviewed: Yes    No anethesia notable event occurred.    Staff: CRNA               BP   101/58   Temp   97.2   Pulse  66   Resp   12   SpO2   99% room air

## 2024-08-15 NOTE — H&P
History and Physical - [facility]  Isael Avendano 68 y.o. male MRN: [id number]                  HPI: Isael Avendano is a 68 y.o. year old male who presents for cirrhosis, variceal banding.      REVIEW OF SYSTEMS: Per the HPI, and otherwise unremarkable.    Historical Information   Past Medical History:   Diagnosis Date    ABILIO (acute kidney injury) (HCC) 12/25/2020    Arrhythmia     Chronic kidney disease     Colon polyp     COVID 12/2020    CPAP (continuous positive airway pressure) dependence     Diabetes mellitus (HCC)     History of echocardiogram 11/14/2017    showed EF of 50-55 percentWith moderate LVH and left ventricle diastolic dysfunction. Left atrium was moderately enlarged. Trace MR noted.     History of Holter monitoring 11/21/2017    showed baseline rhythm of sinus origin with an average heart it of 61 bpm. The lowest heart rate was 49 and the highest heart rate was 10 8 bpm. There were rare single VPCs, and frequent PACs representing 3.2% of total beats. There were several episodes of sinus arrhythmias with sinus bradycardia and heart rate ranging from 40-90 bpm. No sustained dysrhythmias, or pauses noted. The patient did not    History of transfusion 04/2023    platelets    Hypertension     Liver disease     cirhosis    Morbid obesity (HCC) 12/08/2022    Murmur, cardiac     Obese     Obesity, morbid (HCC) 12/08/2022    Sleep apnea      Past Surgical History:   Procedure Laterality Date    CATARACT EXTRACTION      EYE SURGERY Left 1997    FL GUIDED NEEDLE PLAC BX/ASP/INJ  8/28/2023    HERNIA REPAIR  [id number]    IR BIOPSY LIVER MASS  6/28/2024    IR PARACENTESIS  2/19/2024    IR PARACENTESIS  2/21/2024    JOINT REPLACEMENT Right     TKR    KNEE ARTHROPLASTY Right 2008    AK ARTHROPLASTY GLENOHUMERAL JOINT TOTAL SHOULDER Left 04/04/2023    Procedure: ARTHROPLASTY SHOULDER REVERSE;  Surgeon: Marcelo Lester MD;  Location: [facility];  Service: Orthopedics    AK JARED SHOULDER  ARTHRPLSTY HUMERAL&GLENOID COMPNT Left 2023    Procedure: removal of antibiotic spacer, incision and debridement,  with revision reverse shoulder arthroplasty;  Surgeon: Lita Aguilar;  Location: [facility];  Service: Orthopedics    SD JARED SHOULDER ARTHRPLSTY HUMERAL/GLENOID COMPNT Left 2023    Procedure: ARTHROPLASTY SHOULDER REVISION;  Surgeon: Lita Aguilar;  Location: [facility];  Service: Orthopedics    SHOULDER SURGERY Right 2002    WOUND DEBRIDEMENT Left 2023    Procedure: INCISION AND DRAINAGE (I&D) EXTREMITY, vac placement;  Surgeon: Marcelo Lester MD;  Location: [facility];  Service: Orthopedics     Social History   Social History     Substance and Sexual Activity   Alcohol Use Not Currently    Comment: quit      Social History     Substance and Sexual Activity   Drug Use Never     Social History     Tobacco Use   Smoking Status Former    Current packs/day: 0.00    Average packs/day: 0.5 packs/day for 20.0 years (10.0 ttl pk-yrs)    Types: Cigarettes    Start date:     Quit date:     Years since quittin.6   Smokeless Tobacco Never     Family History   Problem Relation Age of Onset    Diabetes Mother     Supraventricular tachycardia Mother     COPD Father     Heart disease Father     Other Father         Sepsis; related to UTI with complicating cardiac problems    Heart disease Paternal Grandmother     Prostate cancer Paternal Grandfather 82       Meds/Allergies       Current Outpatient Medications:     bumetanide (BUMEX) 1 mg tablet    glimepiride (AMARYL) 4 mg tablet    insulin glargine (LANTUS) 100 units/mL subcutaneous injection    Insulin Pen Needle (BD Pen Needle Nayla 2nd Gen) 32G X 4 MM MISC    insulin regular (HumuLIN R,NovoLIN R) 100 units/mL injection    lactulose (CHRONULAC) 10 g/15 mL solution    MILK THISTLE PO    Multiple Vitamin (multivitamin) capsule    pantoprazole (PROTONIX) 40 mg tablet    tamsulosin (FLOMAX) 0.4 mg    traZODone (DESYREL) 50 mg  "tablet    potassium chloride (Klor-Con M20) 20 mEq tablet    Allergies   Allergen Reactions    Sulfa Antibiotics Hives    Daptomycin Other (See Comments)     Possibly contributed to ABILIO on 6/2023 admission        Objective     /66   Pulse 98   Temp (!) 97 °F (36.1 °C) (Temporal)   Resp 18   Ht 5' 11\" (1.803 m)   Wt 102 kg (225 lb)   SpO2 100%   BMI 31.38 kg/m²       PHYSICAL EXAM    Gen: NAD  Head: NCAT  CV: RRR  CHEST: Clear  ABD: soft, NT/ND  EXT: no edema      ASSESSMENT/PLAN:  This is a 68 y.o. year old male here for EGD, and he is stable and optimized for his procedure.        "

## 2024-08-15 NOTE — ANESTHESIA PREPROCEDURE EVALUATION
Procedure:  EGD    Relevant Problems   CARDIO   (+) Essential hypertension   (+) Murmur      ENDO   (+) DM2 (diabetes mellitus, type 2) (HCC)      GI/HEPATIC   (+) Cirrhosis, alcoholic (HCC)   (+) Hepatocellular carcinoma (HCC)      /RENAL   (+) Stage 3a chronic kidney disease (HCC)      HEMATOLOGY   (+) Iron deficiency anemia due to chronic blood loss   (+) Pancytopenia (HCC)   (+) Symptomatic anemia   (+) Thrombocytopenia (HCC)      MUSCULOSKELETAL   (+) Post-traumatic osteoarthritis of left shoulder   (+) Pyogenic arthritis of left shoulder region (HCC)      PULMONARY   (+) MEG (obstructive sleep apnea)      Other   (+) Splenomegaly, congestive, chronic        Physical Exam    Airway    Mallampati score: II  TM Distance: >3 FB  Neck ROM: full     Dental        Cardiovascular      Pulmonary      Other Findings        Anesthesia Plan  ASA Score- 3     Anesthesia Type- IV sedation with anesthesia with ASA Monitors.         Additional Monitors:     Airway Plan:            Plan Factors-Exercise tolerance (METS): >4 METS.    Chart reviewed. EKG reviewed. Imaging results reviewed. Existing labs reviewed. Patient summary reviewed.    Patient is not a current smoker.              Induction- intravenous.    Postoperative Plan-         Informed Consent- Anesthetic plan and risks discussed with patient.  I personally reviewed this patient with the CRNA. Discussed and agreed on the Anesthesia Plan with the CRNA..

## 2024-08-20 ENCOUNTER — APPOINTMENT (OUTPATIENT)
Dept: LAB | Facility: CLINIC | Age: 69
End: 2024-08-20
Payer: MEDICARE

## 2024-08-20 DIAGNOSIS — D50.0 IRON DEFICIENCY ANEMIA DUE TO CHRONIC BLOOD LOSS: ICD-10-CM

## 2024-08-20 LAB
BASOPHILS # BLD AUTO: 0.03 THOUSANDS/ÂΜL (ref 0–0.1)
BASOPHILS NFR BLD AUTO: 1 % (ref 0–1)
EOSINOPHIL # BLD AUTO: 0.23 THOUSAND/ÂΜL (ref 0–0.61)
EOSINOPHIL NFR BLD AUTO: 8 % (ref 0–6)
ERYTHROCYTE [DISTWIDTH] IN BLOOD BY AUTOMATED COUNT: 19.5 % (ref 11.6–15.1)
FERRITIN SERPL-MCNC: 20 NG/ML (ref 24–336)
HCT VFR BLD AUTO: 28.9 % (ref 36.5–49.3)
HGB BLD-MCNC: 8.7 G/DL (ref 12–17)
IMM GRANULOCYTES # BLD AUTO: 0.01 THOUSAND/UL (ref 0–0.2)
IMM GRANULOCYTES NFR BLD AUTO: 0 % (ref 0–2)
IRON SATN MFR SERPL: 7 % (ref 15–50)
IRON SERPL-MCNC: 28 UG/DL (ref 50–212)
LYMPHOCYTES # BLD AUTO: 0.34 THOUSANDS/ÂΜL (ref 0.6–4.47)
LYMPHOCYTES NFR BLD AUTO: 12 % (ref 14–44)
MCH RBC QN AUTO: 24.1 PG (ref 26.8–34.3)
MCHC RBC AUTO-ENTMCNC: 30.1 G/DL (ref 31.4–37.4)
MCV RBC AUTO: 80 FL (ref 82–98)
MONOCYTES # BLD AUTO: 0.27 THOUSAND/ÂΜL (ref 0.17–1.22)
MONOCYTES NFR BLD AUTO: 9 % (ref 4–12)
NEUTROPHILS # BLD AUTO: 2.05 THOUSANDS/ÂΜL (ref 1.85–7.62)
NEUTS SEG NFR BLD AUTO: 70 % (ref 43–75)
NRBC BLD AUTO-RTO: 0 /100 WBCS
PLATELET # BLD AUTO: 66 THOUSANDS/UL (ref 149–390)
RBC # BLD AUTO: 3.61 MILLION/UL (ref 3.88–5.62)
TIBC SERPL-MCNC: 397 UG/DL (ref 250–450)
UIBC SERPL-MCNC: 369 UG/DL (ref 155–355)
WBC # BLD AUTO: 2.93 THOUSAND/UL (ref 4.31–10.16)

## 2024-08-20 PROCEDURE — 83540 ASSAY OF IRON: CPT

## 2024-08-20 PROCEDURE — 82728 ASSAY OF FERRITIN: CPT

## 2024-08-20 PROCEDURE — 85025 COMPLETE CBC W/AUTO DIFF WBC: CPT

## 2024-08-20 PROCEDURE — 36415 COLL VENOUS BLD VENIPUNCTURE: CPT

## 2024-08-20 PROCEDURE — 83550 IRON BINDING TEST: CPT

## 2024-09-03 ENCOUNTER — TELEPHONE (OUTPATIENT)
Age: 69
End: 2024-09-03

## 2024-09-03 DIAGNOSIS — D50.0 IRON DEFICIENCY ANEMIA DUE TO CHRONIC BLOOD LOSS: Primary | ICD-10-CM

## 2024-09-03 DIAGNOSIS — D61.818 PANCYTOPENIA (HCC): ICD-10-CM

## 2024-09-03 NOTE — TELEPHONE ENCOUNTER
Patient's wife, Kaylah, called to see if Dr. Martins had a chance to review his bloodwork results from 8/20/24. [facility] patient also had updated labs at [facility] on Friday 8/30 and the doctor's there wanted to set him up for 3 iron infusions. [facility] they would like to set those up locally with Dr. Martins if possible so they don't have to drive to [place] for these infusions. She reports patient is still feeling fatigued and naps midway through the day. Denies shortness of breath or dizziness at this time.    Request call back on her cell to review results and with plan for treatment. [phone].

## 2024-09-03 NOTE — TELEPHONE ENCOUNTER
Wife Kaylah aware via voicemail that Dr Martins is out of office  Will have MD review lab results upon his return

## 2024-09-06 DIAGNOSIS — D50.0 IRON DEFICIENCY ANEMIA DUE TO CHRONIC BLOOD LOSS: Primary | ICD-10-CM

## 2024-09-06 DIAGNOSIS — D61.818 PANCYTOPENIA (HCC): ICD-10-CM

## 2024-09-06 RX ORDER — SODIUM CHLORIDE 9 MG/ML
20 INJECTION, SOLUTION INTRAVENOUS ONCE
OUTPATIENT
Start: 2024-09-11

## 2024-09-06 NOTE — TELEPHONE ENCOUNTER
Called and spoke with patient and his wife.  He is symptomatic with fatigue.  He recently saw his provider at [facility] who recommended getting iron infusions.  Patient has received Feraheme in the past.  We will make arrangements for him to get additional infusions.  I reviewed indications and adverse reactions including shortness of breath, chest heaviness, muscle cramping, headache, itching, nausea; agrees to proceed with treatment. Patient is advised to contact our office if they has any of the symptoms.  We will repeat labs approximately 2 months after infusions.  Patient and his wife verbalized understanding and agreed with the plan.

## 2024-09-06 NOTE — TELEPHONE ENCOUNTER
"Pt stated Oncology Provider: Dr. Martins    Actionable item:  Call Back from Office Today    What is the reason for the call/chief complaint?  Received a phone call from patient's wife, Kaylah.  Kaylah stated that she understands that Dr. Martins is on vacation but would like to speak to someone regarding patient's labs.  Kaylah stated that patient needs iron infusions and would like to get them started ASAP.  Kaylah stated that patient is feeling very fatigued and \"run down.\"  Patient denies any fevers, increased HR, SOB, lightheadedness/dizziness.  Kaylah would appreciate a call back from the covering provider.  Kaylah can be reached at # [phone].    Priority: High Priority    "

## 2024-09-16 DIAGNOSIS — D61.818 PANCYTOPENIA (HCC): ICD-10-CM

## 2024-09-16 DIAGNOSIS — D50.0 IRON DEFICIENCY ANEMIA DUE TO CHRONIC BLOOD LOSS: Primary | ICD-10-CM

## 2024-09-16 RX ORDER — SODIUM CHLORIDE 9 MG/ML
20 INJECTION, SOLUTION INTRAVENOUS ONCE
Status: CANCELLED | OUTPATIENT
Start: 2024-09-17

## 2024-09-17 ENCOUNTER — HOSPITAL ENCOUNTER (OUTPATIENT)
Dept: INFUSION CENTER | Facility: CLINIC | Age: 69
Discharge: HOME | End: 2024-09-17
Payer: MEDICARE

## 2024-09-17 ENCOUNTER — TELEPHONE (OUTPATIENT)
Age: 69
End: 2024-09-17

## 2024-09-17 VITALS
DIASTOLIC BLOOD PRESSURE: 61 MMHG | HEART RATE: 95 BPM | TEMPERATURE: 96.7 F | RESPIRATION RATE: 20 BRPM | SYSTOLIC BLOOD PRESSURE: 134 MMHG

## 2024-09-17 DIAGNOSIS — D61.818 PANCYTOPENIA (HCC): Primary | ICD-10-CM

## 2024-09-17 DIAGNOSIS — D50.0 IRON DEFICIENCY ANEMIA DUE TO CHRONIC BLOOD LOSS: ICD-10-CM

## 2024-09-17 PROCEDURE — 96365 THER/PROPH/DIAG IV INF INIT: CPT

## 2024-09-17 RX ORDER — SODIUM CHLORIDE 9 MG/ML
20 INJECTION, SOLUTION INTRAVENOUS ONCE
Status: CANCELLED | OUTPATIENT
Start: 2024-09-24

## 2024-09-17 RX ORDER — SODIUM CHLORIDE 9 MG/ML
20 INJECTION, SOLUTION INTRAVENOUS ONCE
Status: COMPLETED | OUTPATIENT
Start: 2024-09-17 | End: 2024-09-17

## 2024-09-17 RX ADMIN — FERUMOXYTOL 510 MG: 510 INJECTION INTRAVENOUS at 12:39

## 2024-09-17 RX ADMIN — SODIUM CHLORIDE 20 ML/HR: 9 INJECTION, SOLUTION INTRAVENOUS at 12:30

## 2024-09-17 NOTE — TELEPHONE ENCOUNTER
8/28/24  [facility] Liver team    Telephone Encounter - Yolanda Cardona, RN - 08/28/2024 1:08 PM EDT    Transplant eval closed as patient has infiltrative HCC, too sick for transplant. Spoke with patient's wife to make them aware of eval closure. Dr. Vu also discussed this with patient at their last appointment.

## 2024-09-17 NOTE — PROGRESS NOTES
Pt to clinic for feraheme, offers no complaints today, tolerated infusion without complications, VSS, aware of next appointment on 9/25/24 at 2:30pm, PIV removed, avs declined.

## 2024-09-19 ENCOUNTER — TELEPHONE (OUTPATIENT)
Dept: HEMATOLOGY ONCOLOGY | Facility: CLINIC | Age: 69
End: 2024-09-19

## 2024-09-19 NOTE — TELEPHONE ENCOUNTER
Patient called stating he was suppose to get labs completed prior to follow up appointment with  for 9/23 but did not see no orders in [facility]. Advised patient I will send a message to provider and staff to place orders. Patient requested a call back once orders are placed so he can get labs completed. Per patient he will not be available between the times 12-3. Per patient can lvm with information and if fasting is required.       Thank you!

## 2024-09-19 NOTE — TELEPHONE ENCOUNTER
Called patient to let him know, no updated labs needed per nurse Margot.  Message left on voicemail.

## 2024-09-23 ENCOUNTER — OFFICE VISIT (OUTPATIENT)
Dept: HEMATOLOGY ONCOLOGY | Facility: CLINIC | Age: 69
End: 2024-09-23
Payer: MEDICARE

## 2024-09-23 VITALS
HEIGHT: 71 IN | TEMPERATURE: 98.9 F | BODY MASS INDEX: 30.02 KG/M2 | HEART RATE: 105 BPM | WEIGHT: 214.4 LBS | DIASTOLIC BLOOD PRESSURE: 64 MMHG | SYSTOLIC BLOOD PRESSURE: 116 MMHG | RESPIRATION RATE: 17 BRPM | OXYGEN SATURATION: 100 %

## 2024-09-23 DIAGNOSIS — C22.0 HEPATOCELLULAR CARCINOMA (HCC): Primary | ICD-10-CM

## 2024-09-23 PROCEDURE — 99214 OFFICE O/P EST MOD 30 MIN: CPT | Performed by: INTERNAL MEDICINE

## 2024-09-23 PROCEDURE — G2211 COMPLEX E/M VISIT ADD ON: HCPCS | Performed by: INTERNAL MEDICINE

## 2024-09-23 RX ORDER — SPIRONOLACTONE 50 MG/1
50 TABLET, FILM COATED ORAL DAILY
COMMUNITY
Start: 2024-08-23

## 2024-09-23 NOTE — PROGRESS NOTES
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    He was recently referred to [facility] where he was diagnosed with HCC by liver biopsy on 6/28/2024.  TACE was attempted on 8/5/2024 but there was no feasible access to vessels and the procedure was aborted with recommendation for repeat CT scans.  He was started on C1 of tremelimumab/durvalumab at [facility] with plans for C2 on 10/1/2024.  Creatinine is 1.11 and T. bili 3.2.  He endorsed pruritus likely secondary to hyperbilirubinemia.  He denied diarrhea, dermatitis, or fatigue.  For convenience he requested that his infusions be transferred from [facility] to [facility].  We will schedule C3 approximately on 9/29/2024.  And prior to this infusion we will schedule repeat MRI, labs including iron studies, and an office visit.    Most recent iron studies from 8/20/2024 showed iron deficiency with iron saturation 7, Ferritin 20, TIBC 397.  He was started on IV iron and received one of two iron infusions with improvement in his symptoms.  He has 1 more infusion planned.  Most recent CBC shows Hb 8.7, MCV 80, WBC 2.9, PLT 66.  With his next set of labs we will draw iron studies to determine whether he needs an additional iron infusion.    Patient's providers at [facility] are:  MD Chaya Linder CRNP     I discussed the above with the patient.  The patient and his wife voiced understanding and agreement.  ______________________________________________________________________    Chief Complaint   Patient presents with    Follow-up     HPI:  Oncology History   Hepatocellular carcinoma (HCC)   5/20/2024 -  Cancer Staged    Staging form: Liver (Excluding Intrahepatic Bile Ducts), AJCC 8th  Edition  - Clinical stage from 5/20/2024: cT2 - Signed by Charlie Martins DO on 6/20/2024  Stage prefix: Initial diagnosis       6/20/2024 Initial Diagnosis    Hepatocellular carcinoma (HCC)       Interval History: Clinically stable.  ECOG-  1 - Symptomatic but completely ambulatory    Review of Systems   Constitutional:  Positive for fatigue. Negative for chills and fever.   HENT:  Negative for nosebleeds.    Eyes:  Negative for discharge.   Respiratory:  Negative for cough and shortness of breath.    Cardiovascular:  Negative for chest pain.   Gastrointestinal:  Negative for abdominal pain, constipation and diarrhea.   Endocrine: Negative for polydipsia.   Genitourinary:  Negative for hematuria.   Musculoskeletal:  Negative for arthralgias.   Skin:  Negative for color change.   Allergic/Immunologic: Negative for immunocompromised state.   Neurological:  Negative for dizziness and headaches.   Hematological:  Negative for adenopathy.   Psychiatric/Behavioral:  Negative for agitation.      Past Medical History:   Diagnosis Date    ABILIO (acute kidney injury) (HCC) 12/25/2020    Arrhythmia     Chronic kidney disease     Colon polyp     COVID 12/2020    CPAP (continuous positive airway pressure) dependence     Diabetes mellitus (HCC)     History of echocardiogram 11/14/2017    showed EF of 50-55 percentWith moderate LVH and left ventricle diastolic dysfunction. Left atrium was moderately enlarged. Trace MR noted.     History of Holter monitoring 11/21/2017    showed baseline rhythm of sinus origin with an average heart it of 61 bpm. The lowest heart rate was 49 and the highest heart rate was 10 8 bpm. There were rare single VPCs, and frequent PACs representing 3.2% of total beats. There were several episodes of sinus arrhythmias with sinus bradycardia and heart rate ranging from 40-90 bpm. No sustained dysrhythmias, or pauses noted. The patient did not    History of transfusion 04/2023    platelets    Hypertension      Liver disease     cirhosis    Morbid obesity (HCC) 12/08/2022    Murmur, cardiac     Obese     Obesity, morbid (HCC) 12/08/2022    Sleep apnea      Patient Active Problem List   Diagnosis    Alcohol abuse    DM2 (diabetes mellitus, type 2) (HCC)    Essential hypertension    Cholelithiasis    Cirrhosis, alcoholic (HCC)    MEG (obstructive sleep apnea)    Pancytopenia (HCC)    Insomnia    Elevated troponin    Chest pressure    Diabetic ulcer of left foot associated with type 2 diabetes mellitus (HCC)    Cellulitis    Post-traumatic osteoarthritis of left shoulder    S/P reverse total shoulder arthroplasty, left    Aftercare following left shoulder joint replacement surgery    Ulcer of left foot, limited to breakdown of skin (HCC)    Postoperative infection of surgical wound    Stage 3a chronic kidney disease (HCC)    Murmur    Urinary retention    Pyogenic arthritis of left shoulder region (HCC)    Perineal abscess    Symptomatic anemia    Volume overload    Chest wall mass    Iron deficiency anemia due to chronic blood loss    Splenomegaly, congestive, chronic    Hepatocellular carcinoma (HCC)    Thrombocytopenia (HCC)    Diabetic ulcer of left midfoot associated with type 2 diabetes mellitus, with fat layer exposed (HCC)     Current Outpatient Medications:     bumetanide (BUMEX) 1 mg tablet, Take 1 tablet (1 mg total) by mouth daily Do not start before February 29, 2024., Disp: 30 tablet, Rfl: 0    Insulin Pen Needle (BD Pen Needle Nayla 2nd Gen) 32G X 4 MM MISC, For use with insulin pen. Pharmacy may dispense brand covered by insurance., Disp: 100 each, Rfl: 0    lactulose (CHRONULAC) 10 g/15 mL solution, Take 20 g by mouth 2 (two) times a day, Disp: , Rfl:     MILK THISTLE PO, Take 1 tablet by mouth 2 (two) times a day, Disp: , Rfl:     Multiple Vitamin (multivitamin) capsule, Take 1 capsule by mouth daily, Disp: 21 capsule, Rfl: 0    pantoprazole (PROTONIX) 40 mg tablet, Take 1 tablet (40 mg total) by mouth  daily, Disp: 90 tablet, Rfl: 1    spironolactone (ALDACTONE) 50 mg tablet, Take 50 mg by mouth daily, Disp: , Rfl:     tamsulosin (FLOMAX) 0.4 mg, Take 1 capsule (0.4 mg total) by mouth daily with dinner, Disp: 30 capsule, Rfl: 0    traZODone (DESYREL) 50 mg tablet, Take 50 mg by mouth daily at bedtime bedtime, Disp: , Rfl:     glimepiride (AMARYL) 4 mg tablet, Take 4 mg by mouth 2 (two) times a day (Patient not taking: Reported on 9/23/2024), Disp: , Rfl:     insulin glargine (LANTUS) 100 units/mL subcutaneous injection, Inject 40 Units under the skin daily at bedtime (Patient not taking: Reported on 9/23/2024), Disp: , Rfl:     insulin regular (HumuLIN R,NovoLIN R) 100 units/mL injection, Inject 10 Units under the skin 3 (three) times a day with meals (Patient not taking: Reported on 9/23/2024), Disp: , Rfl:     potassium chloride (Klor-Con M20) 20 mEq tablet, Take 1 tablet (20 mEq total) by mouth daily Do not start before February 29, 2024. (Patient not taking: Reported on 9/23/2024), Disp: 30 tablet, Rfl: 0  Allergies   Allergen Reactions    Sulfa Antibiotics Hives    Daptomycin Other (See Comments)     Possibly contributed to ABILIO on 6/2023 admission      Past Surgical History:   Procedure Laterality Date    CATARACT EXTRACTION      EYE SURGERY Left 1997    FL GUIDED NEEDLE PLAC BX/ASP/INJ  8/28/2023    HERNIA REPAIR  [id number]    IR BIOPSY LIVER MASS  6/28/2024    IR PARACENTESIS  2/19/2024    IR PARACENTESIS  2/21/2024    JOINT REPLACEMENT Right     TKR    KNEE ARTHROPLASTY Right 2008    DE ARTHROPLASTY GLENOHUMERAL JOINT TOTAL SHOULDER Left 04/04/2023    Procedure: ARTHROPLASTY SHOULDER REVERSE;  Surgeon: Marcelo Lester MD;  Location: [facility];  Service: Orthopedics    DE JARED SHOULDER ARTHRPLSTY HUMERAL&GLENOID COMPNT Left 9/8/2023    Procedure: removal of antibiotic spacer, incision and debridement,  with revision reverse shoulder arthroplasty;  Surgeon: Lita Aguilar;  Location:  MAIN OR;   "Service: Orthopedics    TN JARED SHOULDER ARTHRPLSTY HUMERAL/GLENOID COMPNT Left 06/13/2023    Procedure: ARTHROPLASTY SHOULDER REVISION;  Surgeon: Lita Aguilar;  Location: [facility];  Service: Orthopedics    SHOULDER SURGERY Right 2002    WOUND DEBRIDEMENT Left 05/02/2023    Procedure: INCISION AND DRAINAGE (I&D) EXTREMITY, vac placement;  Surgeon: Marcelo Lester MD;  Location: [facility];  Service: Orthopedics     Social History     Objective:  Vitals:    09/23/24 0804   BP: 116/64   BP Location: Right arm   Patient Position: Sitting   Cuff Size: Adult   Pulse: 105   Resp: 17   Temp: 98.9 °F (37.2 °C)   SpO2: 100%   Weight: 97.3 kg (214 lb 6.4 oz)   Height: 5' 11\" (1.803 m)     Physical Exam  Constitutional:       Appearance: He is well-developed.   HENT:      Head: Normocephalic and atraumatic.      Mouth/Throat:      Mouth: Mucous membranes are moist.   Eyes:      Pupils: Pupils are equal, round, and reactive to light.   Cardiovascular:      Rate and Rhythm: Normal rate and regular rhythm.      Heart sounds: No murmur heard.  Pulmonary:      Breath sounds: Normal breath sounds. No wheezing or rales.   Abdominal:      Palpations: Abdomen is soft.      Tenderness: There is no abdominal tenderness.   Musculoskeletal:         General: No tenderness. Normal range of motion.      Cervical back: Neck supple.   Lymphadenopathy:      Cervical: No cervical adenopathy.   Skin:     Findings: No erythema or rash.   Neurological:      Mental Status: He is alert and oriented to person, place, and time.      Cranial Nerves: No cranial nerve deficit.      Deep Tendon Reflexes: Reflexes are normal and symmetric.   Psychiatric:         Behavior: Behavior normal.     Labs:  I personally reviewed the labs and imaging pertinent to this patient care.    Additional recommendations to follow per attending, Dr. Martins.    Chris Severino DO, PGY4  Hematology/Oncology Fellow  "

## 2024-09-24 ENCOUNTER — OFFICE VISIT (OUTPATIENT)
Age: 69
End: 2024-09-24
Payer: MEDICARE

## 2024-09-24 VITALS
BODY MASS INDEX: 29.96 KG/M2 | HEIGHT: 71 IN | WEIGHT: 214 LBS | DIASTOLIC BLOOD PRESSURE: 72 MMHG | SYSTOLIC BLOOD PRESSURE: 112 MMHG | RESPIRATION RATE: 17 BRPM | HEART RATE: 82 BPM

## 2024-09-24 DIAGNOSIS — L97.421 DIABETIC ULCER OF LEFT MIDFOOT ASSOCIATED WITH TYPE 2 DIABETES MELLITUS, LIMITED TO BREAKDOWN OF SKIN (HCC): Primary | ICD-10-CM

## 2024-09-24 DIAGNOSIS — E11.42 DIABETIC POLYNEUROPATHY ASSOCIATED WITH TYPE 2 DIABETES MELLITUS (HCC): ICD-10-CM

## 2024-09-24 DIAGNOSIS — M21.962 ACQUIRED DEFORMITY OF BOTH FEET: ICD-10-CM

## 2024-09-24 DIAGNOSIS — M21.961 ACQUIRED DEFORMITY OF BOTH FEET: ICD-10-CM

## 2024-09-24 DIAGNOSIS — E11.621 DIABETIC ULCER OF LEFT MIDFOOT ASSOCIATED WITH TYPE 2 DIABETES MELLITUS, LIMITED TO BREAKDOWN OF SKIN (HCC): Primary | ICD-10-CM

## 2024-09-24 DIAGNOSIS — M21.962 METATARSAL DEFORMITY, LEFT: ICD-10-CM

## 2024-09-24 PROCEDURE — 99213 OFFICE O/P EST LOW 20 MIN: CPT | Performed by: PODIATRIST

## 2024-09-24 PROCEDURE — 87147 CULTURE TYPE IMMUNOLOGIC: CPT | Performed by: PODIATRIST

## 2024-09-24 PROCEDURE — 87070 CULTURE OTHR SPECIMN AEROBIC: CPT | Performed by: PODIATRIST

## 2024-09-24 PROCEDURE — 87205 SMEAR GRAM STAIN: CPT | Performed by: PODIATRIST

## 2024-09-24 PROCEDURE — 87077 CULTURE AEROBIC IDENTIFY: CPT | Performed by: PODIATRIST

## 2024-09-24 PROCEDURE — 87186 SC STD MICRODIL/AGAR DIL: CPT | Performed by: PODIATRIST

## 2024-09-25 ENCOUNTER — HOSPITAL ENCOUNTER (OUTPATIENT)
Dept: INFUSION CENTER | Facility: CLINIC | Age: 69
Discharge: HOME | End: 2024-09-25
Payer: MEDICARE

## 2024-09-25 VITALS
SYSTOLIC BLOOD PRESSURE: 124 MMHG | RESPIRATION RATE: 18 BRPM | HEART RATE: 95 BPM | DIASTOLIC BLOOD PRESSURE: 61 MMHG | TEMPERATURE: 96.3 F

## 2024-09-25 DIAGNOSIS — D61.818 PANCYTOPENIA (HCC): Primary | ICD-10-CM

## 2024-09-25 DIAGNOSIS — D50.0 IRON DEFICIENCY ANEMIA DUE TO CHRONIC BLOOD LOSS: ICD-10-CM

## 2024-09-25 PROCEDURE — 96365 THER/PROPH/DIAG IV INF INIT: CPT

## 2024-09-25 RX ORDER — SODIUM CHLORIDE 9 MG/ML
20 INJECTION, SOLUTION INTRAVENOUS ONCE
Status: COMPLETED | OUTPATIENT
Start: 2024-09-25 | End: 2024-09-25

## 2024-09-25 RX ORDER — SODIUM CHLORIDE 9 MG/ML
20 INJECTION, SOLUTION INTRAVENOUS ONCE
Status: CANCELLED | OUTPATIENT
Start: 2024-10-01

## 2024-09-25 RX ADMIN — SODIUM CHLORIDE 20 ML/HR: 0.9 INJECTION, SOLUTION INTRAVENOUS at 15:13

## 2024-09-25 RX ADMIN — FERUMOXYTOL 510 MG: 510 INJECTION INTRAVENOUS at 15:13

## 2024-09-25 NOTE — PROGRESS NOTES
Pt here for IV feraheme, offering no complaints. PIV established with positive blood return noted. Tolerated entire infusion without complications. Repeat vitals stable. PIV flushed and removed. AVS declined. No future appt scheduled at this time. Walked out in stable condition.

## 2024-09-27 LAB
BACTERIA WND AEROBE CULT: ABNORMAL
GRAM STN SPEC: ABNORMAL

## 2024-10-02 ENCOUNTER — TELEPHONE (OUTPATIENT)
Age: 69
End: 2024-10-02

## 2024-10-02 NOTE — TELEPHONE ENCOUNTER
Pt wife Kaylah stated during pt's 9/23/24 visit with Dr. Martins that it was discussed that Dr. Martins wanted him to get another blood test completed. Also stated that Oziel was suppose to get in contact with Dr. Martins yesterday because [place] wants pt to get an MRI and blood work completed as soon as possible. Kaylah also mentioned that Dr. Martins was to transfer pt's immune therapy more local to pt. Kalyah would like a call back from Dr. Martins to discuss further at [phone], thank you.

## 2024-10-03 NOTE — TELEPHONE ENCOUNTER
Patient's spouse,Sharon called to follow up on the MRI request from yesterday. She stated patient will be seeing the liver doctor,Dr Florence on 10/17 and will like to get the MRI done prior, so he can have it for the visit as well.Please call patient or Juliana back as soon as it is ordered. She did also state Oziel was going to fax the request to Dr Martins.

## 2024-10-04 ENCOUNTER — TELEPHONE (OUTPATIENT)
Dept: HEMATOLOGY ONCOLOGY | Facility: CLINIC | Age: 69
End: 2024-10-04

## 2024-10-04 NOTE — TELEPHONE ENCOUNTER
Telephone call to [facility] spoke with Kandace .  She stated Dr Low already ordered an MRI.  Pt has [facility] so no prior auth needed.  She is faxing the order to our office and Pt can have it done at our facility or their's.  Awaiting to receive the fax.

## 2024-10-04 NOTE — TELEPHONE ENCOUNTER
Telephone call spoke with Kaylah Pt's spouse.  MRI has been changed to a week earlier, 10/12/24 at 415 pm at [facility], [place] [id number].  Kaylah stated she will check their calendar when she gets home and if that appt does not work she will cancel it via [place].  Explained to her that she can call central scheduling that we do not get preferential tx when calling to schedule.  They are currently at another appt.

## 2024-11-11 ENCOUNTER — NURSE TRIAGE (OUTPATIENT)
Age: 69
End: 2024-11-11

## 2024-11-11 DIAGNOSIS — R18.8 CIRRHOSIS OF LIVER WITH ASCITES, UNSPECIFIED HEPATIC CIRRHOSIS TYPE  (HCC): Primary | ICD-10-CM

## 2024-11-11 DIAGNOSIS — K74.60 CIRRHOSIS OF LIVER WITH ASCITES, UNSPECIFIED HEPATIC CIRRHOSIS TYPE  (HCC): Primary | ICD-10-CM

## 2024-11-11 NOTE — TELEPHONE ENCOUNTER
"Dr. Cline office called. States they received call form wife Kaylah requesting paracentesis with drain placement as \"fluid is oozing out of his skin and onto groin area\". They have prescribed ointment for necrotic tissue.             "

## 2024-11-11 NOTE — TELEPHONE ENCOUNTER
Last OV 7/9/24 Dr. Mcdaniel Hx Alcoholic cirrhosis of liver without ascites     Roberta RN, with [facility] called. Pt recently in the hospital. Paracentesis performed while inpatient. Pt refused SNF and discharged home. RN states sites are leaking and abdomen distended. Was advised to have pt follow up with GI. Feels pt needs paracentesis ASAP however pt declines ED.      Roberta [phone]    Called pt and spoke with daughter Leona (on Communication Consent form). Pt is having abdominal discomfort due to distension. Denies jaundice, nausea, vomiting, changes to bowel movement. Unsure of which medications pt is taking and believes pt is taking Bumex. States pt Is no longer following with [facility].    The soonest appt is 12/19/24 however daughter is requesting virtual appt if possible.States transportation/ambulation for pt is challenging. Can be reached at pt's number [phone].     Please advise.    Additional Information   Affirmative: The patient has moderate abdominal pain that is new or worsening    Answer Assessment - Initial Assessment Questions  1. What is your diagnosis?  Cirrhosis  2. What current medications are you taking (diuretics, beta blockers, lactulose, Xifaxan, Ozempic, Ocaliva, etc..)? Are you taking your medications as prescribed or missed any doses?  Unsure of meds but taking Bumex  3. Have you had any recent changes in your medications or dosage? Are you taking any new medications?  Unsure  4. What symptoms are you experiencing? (ex: Abdominal pain, jaundice (skin/eyes), abdominal distention)  Distension  6. Are you having any pain? What is your pain on a scale of 1-10?   Discomfort  8. Have you had any recent blood work (CBC, CMP, PT/INR) or any recent testing, imaging, or procedures? If applicable, when was your last paracentesis?  Recent hospitalization    Protocols used: GI-Hepatology-ADULT-[place]

## 2024-11-11 NOTE — TELEPHONE ENCOUNTER
[facility] calling back stating pt. Will need another paracentesis, his pcp is trying to arrange to possibly get him tapped tomorrow, home health nurse calling asking if he can have gravity drains placed so they can drain him at home, pt. Is unable to stand and [place] and requires special transport and he has to pay for it, home health nurse feels he is trending towards hospice due to his poor condition but pt. Family is not ready, asking for input, please call [facility]  at [phone] , fax order to IR if a drain can be placed so home health can do gravity drains at home, pt. Is trying to get tapped tomorrow and they are asking if order can be placed while he's getting drained this week as it's hard for pt. To leave the home , please reach out to Roberta with any questions regarding this message

## 2024-11-12 ENCOUNTER — HOSPITAL ENCOUNTER (EMERGENCY)
Facility: HOSPITAL | Age: 69
Discharge: HOME | DRG: 871 | End: 2024-11-12
Attending: EMERGENCY MEDICINE
Payer: MEDICARE

## 2024-11-12 VITALS
TEMPERATURE: 97.6 F | RESPIRATION RATE: 22 BRPM | HEART RATE: 95 BPM | SYSTOLIC BLOOD PRESSURE: 105 MMHG | OXYGEN SATURATION: 97 % | DIASTOLIC BLOOD PRESSURE: 62 MMHG

## 2024-11-12 DIAGNOSIS — K70.31 ASCITES DUE TO ALCOHOLIC CIRRHOSIS (HCC): Primary | ICD-10-CM

## 2024-11-12 LAB
ALBUMIN SERPL BCG-MCNC: 2.1 G/DL (ref 3.5–5)
ALBUMIN SERPL BCG-MCNC: 2.2 G/DL (ref 3.5–5)
ALP SERPL-CCNC: 264 U/L (ref 34–104)
ALP SERPL-CCNC: 284 U/L (ref 34–104)
ALT SERPL W P-5'-P-CCNC: 37 U/L (ref 7–52)
ALT SERPL W P-5'-P-CCNC: 38 U/L (ref 7–52)
ANION GAP SERPL CALCULATED.3IONS-SCNC: 4 MMOL/L (ref 4–13)
ANION GAP SERPL CALCULATED.3IONS-SCNC: 5 MMOL/L (ref 4–13)
ANISOCYTOSIS BLD QL SMEAR: PRESENT
AST SERPL W P-5'-P-CCNC: 32 U/L (ref 13–39)
AST SERPL W P-5'-P-CCNC: 50 U/L (ref 13–39)
ATRIAL RATE: 103 BPM
BASOPHILS # BLD AUTO: 0.01 THOUSANDS/ΜL (ref 0–0.1)
BASOPHILS NFR BLD AUTO: 0 % (ref 0–1)
BILIRUB SERPL-MCNC: 2.24 MG/DL (ref 0.2–1)
BILIRUB SERPL-MCNC: 2.32 MG/DL (ref 0.2–1)
BUN SERPL-MCNC: 33 MG/DL (ref 5–25)
BUN SERPL-MCNC: 34 MG/DL (ref 5–25)
CALCIUM ALBUM COR SERPL-MCNC: 10 MG/DL (ref 8.3–10.1)
CALCIUM ALBUM COR SERPL-MCNC: 9.5 MG/DL (ref 8.3–10.1)
CALCIUM SERPL-MCNC: 8 MG/DL (ref 8.4–10.2)
CALCIUM SERPL-MCNC: 8.6 MG/DL (ref 8.4–10.2)
CHLORIDE SERPL-SCNC: 101 MMOL/L (ref 96–108)
CHLORIDE SERPL-SCNC: 101 MMOL/L (ref 96–108)
CO2 SERPL-SCNC: 22 MMOL/L (ref 21–32)
CO2 SERPL-SCNC: 24 MMOL/L (ref 21–32)
CREAT SERPL-MCNC: 1.45 MG/DL (ref 0.6–1.3)
CREAT SERPL-MCNC: 1.5 MG/DL (ref 0.6–1.3)
EOSINOPHIL # BLD AUTO: 0.3 THOUSAND/ΜL (ref 0–0.61)
EOSINOPHIL NFR BLD AUTO: 4 % (ref 0–6)
ERYTHROCYTE [DISTWIDTH] IN BLOOD BY AUTOMATED COUNT: 23.4 % (ref 11.6–15.1)
GFR SERPL CREATININE-BSD FRML MDRD: 46 ML/MIN/1.73SQ M
GFR SERPL CREATININE-BSD FRML MDRD: 48 ML/MIN/1.73SQ M
GLUCOSE SERPL-MCNC: 220 MG/DL (ref 65–140)
GLUCOSE SERPL-MCNC: 222 MG/DL (ref 65–140)
HCT VFR BLD AUTO: 27.6 % (ref 36.5–49.3)
HGB BLD-MCNC: 8.5 G/DL (ref 12–17)
IMM GRANULOCYTES # BLD AUTO: 0.05 THOUSAND/UL (ref 0–0.2)
IMM GRANULOCYTES NFR BLD AUTO: 1 % (ref 0–2)
LYMPHOCYTES # BLD AUTO: 0.53 THOUSANDS/ΜL (ref 0.6–4.47)
LYMPHOCYTES NFR BLD AUTO: 7 % (ref 14–44)
MCH RBC QN AUTO: 26.1 PG (ref 26.8–34.3)
MCHC RBC AUTO-ENTMCNC: 30.8 G/DL (ref 31.4–37.4)
MCV RBC AUTO: 85 FL (ref 82–98)
MONOCYTES # BLD AUTO: 0.83 THOUSAND/ΜL (ref 0.17–1.22)
MONOCYTES NFR BLD AUTO: 11 % (ref 4–12)
NEUTROPHILS # BLD AUTO: 6 THOUSANDS/ΜL (ref 1.85–7.62)
NEUTS SEG NFR BLD AUTO: 77 % (ref 43–75)
NRBC BLD AUTO-RTO: 0 /100 WBCS
OVALOCYTES BLD QL SMEAR: PRESENT
P AXIS: 27 DEGREES
PLATELET # BLD AUTO: 67 THOUSANDS/UL (ref 149–390)
PLATELET BLD QL SMEAR: ABNORMAL
POIKILOCYTOSIS BLD QL SMEAR: PRESENT
POTASSIUM SERPL-SCNC: 4.7 MMOL/L (ref 3.5–5.3)
POTASSIUM SERPL-SCNC: 7.7 MMOL/L (ref 3.5–5.3)
PR INTERVAL: 164 MS
PROT SERPL-MCNC: 5.4 G/DL (ref 6.4–8.4)
PROT SERPL-MCNC: 5.5 G/DL (ref 6.4–8.4)
QRS AXIS: -4 DEGREES
QRSD INTERVAL: 82 MS
QT INTERVAL: 330 MS
QTC INTERVAL: 432 MS
RBC # BLD AUTO: 3.26 MILLION/UL (ref 3.88–5.62)
RBC MORPH BLD: PRESENT
SODIUM SERPL-SCNC: 127 MMOL/L (ref 135–147)
SODIUM SERPL-SCNC: 130 MMOL/L (ref 135–147)
T WAVE AXIS: 20 DEGREES
VENTRICULAR RATE: 103 BPM
WBC # BLD AUTO: 7.72 THOUSAND/UL (ref 4.31–10.16)

## 2024-11-12 PROCEDURE — 99284 EMERGENCY DEPT VISIT MOD MDM: CPT

## 2024-11-12 PROCEDURE — 49082 ABD PARACENTESIS: CPT | Performed by: EMERGENCY MEDICINE

## 2024-11-12 PROCEDURE — 99285 EMERGENCY DEPT VISIT HI MDM: CPT | Performed by: EMERGENCY MEDICINE

## 2024-11-12 PROCEDURE — 85025 COMPLETE CBC W/AUTO DIFF WBC: CPT

## 2024-11-12 PROCEDURE — 93005 ELECTROCARDIOGRAM TRACING: CPT

## 2024-11-12 PROCEDURE — 93010 ELECTROCARDIOGRAM REPORT: CPT | Performed by: INTERNAL MEDICINE

## 2024-11-12 PROCEDURE — 36415 COLL VENOUS BLD VENIPUNCTURE: CPT

## 2024-11-12 PROCEDURE — 80053 COMPREHEN METABOLIC PANEL: CPT | Performed by: EMERGENCY MEDICINE

## 2024-11-12 PROCEDURE — 96366 THER/PROPH/DIAG IV INF ADDON: CPT

## 2024-11-12 PROCEDURE — 96365 THER/PROPH/DIAG IV INF INIT: CPT

## 2024-11-12 PROCEDURE — 80053 COMPREHEN METABOLIC PANEL: CPT

## 2024-11-12 RX ORDER — ALBUMIN HUMAN 50 G/1000ML
25 SOLUTION INTRAVENOUS ONCE
Status: COMPLETED | OUTPATIENT
Start: 2024-11-12 | End: 2024-11-12

## 2024-11-12 RX ADMIN — ALBUMIN (HUMAN) 25 G: 12.5 INJECTION, SOLUTION INTRAVENOUS at 16:43

## 2024-11-12 NOTE — ED PROVIDER NOTES
Time reflects when diagnosis was documented in both MDM as applicable and the Disposition within this note       Time User Action Codes Description Comment    11/12/2024  4:35 PM Rashi Trina Add [K70.31] Ascites due to alcoholic cirrhosis (HCC)           ED Disposition       ED Disposition   Discharge    Condition   Stable    Date/Time   Tue Nov 12, 2024  4:35 PM    Comment   Isael Avendano discharge to home/self care.                   Assessment & Plan       Medical Decision Making  69-year-old male with history of recurrent ascites presenting with abdominal distention and discomfort.  On initial evaluation, patient appeared calm and with mild tachycardia but otherwise with stable vitals.  Exam showed no focal abdominal tenderness but significant distention concerning for recurrence of ascites again.  Weeping from the abdomen could be secondary to leaking of ascitic fluid from previous paracentesis site.  Given significant symptoms related to weeping from the abdomen in general abdominal discomfort, paracentesis was performed at bedside and 5 L total were removed.  Albumin was replaced subsequently and the patient was able to stand and ambulate with a walker at his baseline.  Labs drawn showed hyponatremia consistent with previous lab values and some dehydration affecting kidney function.  Discussed risks versus benefits of admission to the hospital and patient declined at this time.  Ultimately, we do feel patient would benefit from follow-up outpatient with palliative care and PCP/hepatology with regard to arranging outpatient paracentesis.  Patient was signed out to oncoming physician team in the emergency department pending completion of albumin infusion in stable condition with anticipation for discharge in stable condition.    Problems Addressed:  Ascites due to alcoholic cirrhosis (HCC): acute illness or injury    Amount and/or Complexity of Data Reviewed  Labs: ordered.    Risk  Prescription drug  management.             Medications   albumin human (FLEXBUMIN) 5 % injection 25 g (0 g Intravenous Stopped 11/12/24 1917)       ED Risk Strat Scores                                               History of Present Illness       Chief Complaint   Patient presents with    Bloated     Abd swelling x2 weeks, pt here for parcentesis hx of same, SOB, abd discomfort; chronic leg swelling       Past Medical History:   Diagnosis Date    ABILIO (acute kidney injury) (HCC) 12/25/2020    Arrhythmia     Chronic kidney disease     Colon polyp     COVID 12/2020    CPAP (continuous positive airway pressure) dependence     Diabetes mellitus (HCC)     History of echocardiogram 11/14/2017    showed EF of 50-55 percentWith moderate LVH and left ventricle diastolic dysfunction. Left atrium was moderately enlarged. Trace MR noted.     History of Holter monitoring 11/21/2017    showed baseline rhythm of sinus origin with an average heart it of 61 bpm. The lowest heart rate was 49 and the highest heart rate was 10 8 bpm. There were rare single VPCs, and frequent PACs representing 3.2% of total beats. There were several episodes of sinus arrhythmias with sinus bradycardia and heart rate ranging from 40-90 bpm. No sustained dysrhythmias, or pauses noted. The patient did not    History of transfusion 04/2023    platelets    Hypertension     Liver disease     cirhosis    Morbid obesity (HCC) 12/08/2022    Murmur, cardiac     Obese     Obesity, morbid (HCC) 12/08/2022    Sleep apnea       Past Surgical History:   Procedure Laterality Date    CATARACT EXTRACTION      EYE SURGERY Left 1997    FL GUIDED NEEDLE PLAC BX/ASP/INJ  8/28/2023    HERNIA REPAIR  [id number]    IR BIOPSY LIVER MASS  6/28/2024    IR PARACENTESIS  2/19/2024    IR PARACENTESIS  2/21/2024    JOINT REPLACEMENT Right     TKR    KNEE ARTHROPLASTY Right 2008    NM ARTHROPLASTY GLENOHUMERAL JOINT TOTAL SHOULDER Left 04/04/2023    Procedure: ARTHROPLASTY SHOULDER REVERSE;  Surgeon:  Marcelo Lester MD;  Location: [facility];  Service: Orthopedics    CO JARED SHOULDER ARTHRPLSTY HUMERAL&GLENOID COMPNT Left 2023    Procedure: removal of antibiotic spacer, incision and debridement,  with revision reverse shoulder arthroplasty;  Surgeon: Lita Aguilar;  Location: [facility];  Service: Orthopedics    CO JARED SHOULDER ARTHRPLSTY HUMERAL/GLENOID COMPNT Left 2023    Procedure: ARTHROPLASTY SHOULDER REVISION;  Surgeon: Lita Aguilar;  Location: [facility];  Service: Orthopedics    SHOULDER SURGERY Right 2002    WOUND DEBRIDEMENT Left 2023    Procedure: INCISION AND DRAINAGE (I&D) EXTREMITY, vac placement;  Surgeon: Marcelo Lester MD;  Location: [facility];  Service: Orthopedics      Family History   Problem Relation Age of Onset    Diabetes Mother     Supraventricular tachycardia Mother     COPD Father     Heart disease Father     Other Father         Sepsis; related to UTI with complicating cardiac problems    Heart disease Paternal Grandmother     Prostate cancer Paternal Grandfather 82      Social History     Tobacco Use    Smoking status: Former     Current packs/day: 0.00     Average packs/day: 0.5 packs/day for 20.0 years (10.0 ttl pk-yrs)     Types: Cigarettes     Start date:      Quit date:      Years since quittin.8    Smokeless tobacco: Never   Vaping Use    Vaping status: Never Used   Substance Use Topics    Alcohol use: Not Currently     Comment: quit     Drug use: Never      E-Cigarette/Vaping    E-Cigarette Use Never User       E-Cigarette/Vaping Substances    Nicotine No     THC No     CBD No     Flavoring No     Other No     Unknown No       I have reviewed and agree with the history as documented.     HPI    Patient is a 69-year-old male with history of alcoholic liver cirrhosis with recurrent ascites presenting with abdominal distention and leaking.  He states that he has had previous paracentesis with the last being approximately 2  to 3 weeks ago.  He has had worsening abdominal bloating since then and has felt some fluid leaking out of the bottom of his abdomen into the groin area.  He also notices some local skin irritation from moisture.  Admits some mild abdominal discomfort generally but no severe, sharp pain or fevers, chills, nausea or vomiting.  Has been previously diagnosed with Listeria infection so has a PICC line in place infusing ampicillin.  According to family, they believe the patient needs a paracentesis given significant worsening of symptoms and has been working outpatient to obtain possible gravity drain to help facilitate care but has not been able to do this yet.    Review of Systems   Constitutional:  Negative for chills and fever.   HENT:  Negative for ear pain and sore throat.    Respiratory:  Negative for cough and shortness of breath.    Cardiovascular:  Negative for chest pain and palpitations.   Gastrointestinal:  Positive for abdominal distention. Negative for abdominal pain and vomiting.   Genitourinary:  Negative for dysuria and hematuria.   Musculoskeletal:  Negative for arthralgias and back pain.   Skin:  Negative for color change and rash.   Neurological:  Negative for syncope.   All other systems reviewed and are negative.          Objective       ED Triage Vitals [11/12/24 1021]   Temperature Pulse Blood Pressure Respirations SpO2 Patient Position - Orthostatic VS   97.6 °F (36.4 °C) (!) 110 132/77 22 96 % Sitting      Temp Source Heart Rate Source BP Location FiO2 (%) Pain Score    Oral Monitor Left arm -- --      Vitals      Date and Time Temp Pulse SpO2 Resp BP Pain Score FACES Pain Rating User   11/12/24 1901 -- 95 97 % -- 105/62 -- --    11/12/24 1700 -- 103 99 % -- 91/53 -- --    11/12/24 1021 97.6 °F (36.4 °C) 110 96 % 22 132/77 -- -- AK            Physical Exam  Vitals and nursing note reviewed.   Constitutional:       Appearance: He is well-developed.   HENT:      Head: Normocephalic and  atraumatic.   Eyes:      Conjunctiva/sclera: Conjunctivae normal.   Cardiovascular:      Rate and Rhythm: Regular rhythm. Tachycardia present.      Heart sounds: No murmur heard.  Pulmonary:      Effort: Pulmonary effort is normal. No respiratory distress.      Breath sounds: Normal breath sounds.   Abdominal:      General: There is distension.      Palpations: Abdomen is soft.      Tenderness: There is no abdominal tenderness.   Musculoskeletal:         General: No swelling.   Skin:     General: Skin is warm and dry.      Findings: Erythema present.      Comments: Weeping of straw-colored fluid from left lower quadrant on the bandage.  No obvious wound noted.  Local irritation of the skin over location where bandages are placed.   Neurological:      Mental Status: He is alert.   Psychiatric:         Mood and Affect: Mood normal.         Results Reviewed       Procedure Component Value Units Date/Time    Comprehensive metabolic panel [[id number]]  (Abnormal) Collected: 11/12/24 1437    Lab Status: Final result Specimen: Blood from Arm, Left Updated: 11/12/24 1516     Sodium 130 mmol/L      Potassium 4.7 mmol/L      Chloride 101 mmol/L      CO2 24 mmol/L      ANION GAP 5 mmol/L      BUN 34 mg/dL      Creatinine 1.50 mg/dL      Glucose 222 mg/dL      Calcium 8.6 mg/dL      Corrected Calcium 10.0 mg/dL      AST 32 U/L      ALT 38 U/L      Alkaline Phosphatase 284 U/L      Total Protein 5.5 g/dL      Albumin 2.2 g/dL      Total Bilirubin 2.32 mg/dL      eGFR 46 ml/min/1.73sq m     Narrative:      National Kidney Disease Foundation guidelines for Chronic Kidney Disease (CKD):     Stage 1 with normal or high GFR (GFR > 90 mL/min/1.73 square meters)    Stage 2 Mild CKD (GFR = 60-89 mL/min/1.73 square meters)    Stage 3A Moderate CKD (GFR = 45-59 mL/min/1.73 square meters)    Stage 3B Moderate CKD (GFR = 30-44 mL/min/1.73 square meters)    Stage 4 Severe CKD (GFR = 15-29 mL/min/1.73 square meters)    Stage 5 End Stage CKD  (GFR <15 mL/min/1.73 square meters)  Note: GFR calculation is accurate only with a steady state creatinine    Comprehensive metabolic panel [[id number]]  (Abnormal) Collected: 11/12/24 1215    Lab Status: Final result Specimen: Blood from Central Venous Line Updated: 11/12/24 1317     Sodium 127 mmol/L      Potassium 7.7 mmol/L      Chloride 101 mmol/L      CO2 22 mmol/L      ANION GAP 4 mmol/L      BUN 33 mg/dL      Creatinine 1.45 mg/dL      Glucose 220 mg/dL      Calcium 8.0 mg/dL      Corrected Calcium 9.5 mg/dL      AST 50 U/L      ALT 37 U/L      Alkaline Phosphatase 264 U/L      Total Protein 5.4 g/dL      Albumin 2.1 g/dL      Total Bilirubin 2.24 mg/dL      eGFR 48 ml/min/1.73sq m     Narrative:      National Kidney Disease Foundation guidelines for Chronic Kidney Disease (CKD):     Stage 1 with normal or high GFR (GFR > 90 mL/min/1.73 square meters)    Stage 2 Mild CKD (GFR = 60-89 mL/min/1.73 square meters)    Stage 3A Moderate CKD (GFR = 45-59 mL/min/1.73 square meters)    Stage 3B Moderate CKD (GFR = 30-44 mL/min/1.73 square meters)    Stage 4 Severe CKD (GFR = 15-29 mL/min/1.73 square meters)    Stage 5 End Stage CKD (GFR <15 mL/min/1.73 square meters)  Note: GFR calculation is accurate only with a steady state creatinine    CBC and differential [[id number]]  (Abnormal) Collected: 11/12/24 1215    Lab Status: Final result Specimen: Blood from Central Venous Line Updated: 11/12/24 1307     WBC 7.72 Thousand/uL      RBC 3.26 Million/uL      Hemoglobin 8.5 g/dL      Hematocrit 27.6 %      MCV 85 fL      MCH 26.1 pg      MCHC 30.8 g/dL      RDW 23.4 %      Platelets 67 Thousands/uL      nRBC 0 /100 WBCs      Segmented % 77 %      Immature Grans % 1 %      Lymphocytes % 7 %      Monocytes % 11 %      Eosinophils Relative 4 %      Basophils Relative 0 %      Absolute Neutrophils 6.00 Thousands/µL      Absolute Immature Grans 0.05 Thousand/uL      Absolute Lymphocytes 0.53 Thousands/µL      Absolute Monocytes  0.83 Thousand/µL      Eosinophils Absolute 0.30 Thousand/µL      Basophils Absolute 0.01 Thousands/µL     Narrative:      This is an appended report.  These results have been appended to a previously verified report.    Smear Review(Phlebs Do Not Order) [[id number]]  (Abnormal) Collected: 11/12/24 1215    Lab Status: Final result Specimen: Blood from Central Venous Line Updated: 11/12/24 1307     RBC Morphology Present     Platelet Estimate Decreased     Anisocytosis Present     Ovalocytes Present     Poikilocytes Present            No orders to display       Paracentesis    Date/Time: 11/12/2024 3:45 PM    Performed by: Trina Markham DO  Authorized by: Trina Markham DO    Patient location:  ED  Other Assisting Provider: Yes (comment) (Humza Suárez DO)    Consent:     Consent obtained:  Written    Consent given by:  Patient and spouse    Risks discussed:  Bleeding, infection, bowel perforation and pain    Alternatives discussed:  No treatment and delayed treatment  Universal protocol:     Patient identity confirmed:  Verbally with patient  Pre-procedure details:     Procedure purpose:  Therapeutic    Indications: abdominal discomfort secondary to ascites      Preparation: Patient was prepped and draped in usual sterile fashion    Anesthesia (see MAR for exact dosages):     Anesthesia method:  Local infiltration    Local anesthetic:  Lidocaine 1% w/o epi  Procedure details:     Equipment: Paracentesis kit used      Ultrasound guidance: yes      Ultrasound image availability:  Not saved    Sterile ultrasound techniques: Sterile gel and sterile probe covers were used      Approach:  Percutaneous    Puncture site:  R lower quadrant    Fluid appearance:  Yellow    Dressing:  Adhesive bandage  Post-procedure details:     Patient tolerance of procedure:  Tolerated well, no immediate complications  Post-procedure medication (see MAR for dosing):     Albumin replacement: Yes        ED  Medication and Procedure Management   Prior to Admission Medications   Prescriptions Last Dose Informant Patient Reported? Taking?   Insulin Pen Needle (BD Pen Needle Nayla 2nd Gen) 32G X 4 MM MISC  Self No No   Sig: For use with insulin pen. Pharmacy may dispense brand covered by insurance.   MILK THISTLE PO  Self Yes No   Sig: Take 1 tablet by mouth 2 (two) times a day   Multiple Vitamin (multivitamin) capsule  Self No No   Sig: Take 1 capsule by mouth daily   bumetanide (BUMEX) 1 mg tablet  Self No No   Sig: Take 1 tablet (1 mg total) by mouth daily Do not start before February 29, 2024.   glimepiride (AMARYL) 4 mg tablet  Self Yes No   Sig: Take 4 mg by mouth 2 (two) times a day   Patient not taking: Reported on 9/23/2024   insulin glargine (LANTUS) 100 units/mL subcutaneous injection  Self Yes No   Sig: Inject 40 Units under the skin daily at bedtime   Patient not taking: Reported on 9/23/2024   insulin regular (HumuLIN R,NovoLIN R) 100 units/mL injection  Self Yes No   Sig: Inject 10 Units under the skin 3 (three) times a day with meals   Patient not taking: Reported on 9/23/2024   lactulose (CHRONULAC) 10 g/15 mL solution  Self Yes No   Sig: Take 20 g by mouth 2 (two) times a day   pantoprazole (PROTONIX) 40 mg tablet  Self No No   Sig: Take 1 tablet (40 mg total) by mouth daily   potassium chloride (Klor-Con M20) 20 mEq tablet  Self No No   Sig: Take 1 tablet (20 mEq total) by mouth daily Do not start before February 29, 2024.   Patient not taking: Reported on 9/23/2024   spironolactone (ALDACTONE) 50 mg tablet   Yes No   Sig: Take 50 mg by mouth daily   tamsulosin (FLOMAX) 0.4 mg  Self No No   Sig: Take 1 capsule (0.4 mg total) by mouth daily with dinner   traZODone (DESYREL) 50 mg tablet  Self Yes No   Sig: Take 50 mg by mouth daily at bedtime bedtime      Facility-Administered Medications: None     Discharge Medication List as of 11/12/2024  7:04 PM        CONTINUE these medications which have NOT CHANGED     Details   bumetanide (BUMEX) 1 mg tablet Take 1 tablet (1 mg total) by mouth daily Do not start before February 29, 2024., Starting Thu 2/29/2024, Normal      glimepiride (AMARYL) 4 mg tablet Take 4 mg by mouth 2 (two) times a day, Starting Wed 3/22/2023, Historical Med      insulin glargine (LANTUS) 100 units/mL subcutaneous injection Inject 40 Units under the skin daily at bedtime, Historical Med      Insulin Pen Needle (BD Pen Needle Nayla 2nd Gen) 32G X 4 MM MISC For use with insulin pen. Pharmacy may dispense brand covered by insurance., Normal      insulin regular (HumuLIN R,NovoLIN R) 100 units/mL injection Inject 10 Units under the skin 3 (three) times a day with meals, Historical Med      lactulose (CHRONULAC) 10 g/15 mL solution Take 20 g by mouth 2 (two) times a day, Starting Fri 6/21/2024, Historical Med      MILK THISTLE PO Take 1 tablet by mouth 2 (two) times a day, Historical Med      Multiple Vitamin (multivitamin) capsule Take 1 capsule by mouth daily, Starting Sun 1/3/2021, Normal      pantoprazole (PROTONIX) 40 mg tablet Take 1 tablet (40 mg total) by mouth daily, Starting Fri 6/14/2024, Until Wed 12/11/2024, Normal      potassium chloride (Klor-Con M20) 20 mEq tablet Take 1 tablet (20 mEq total) by mouth daily Do not start before February 29, 2024., Starting Thu 2/29/2024, Normal      spironolactone (ALDACTONE) 50 mg tablet Take 50 mg by mouth daily, Starting Fri 8/23/2024, Historical Med      tamsulosin (FLOMAX) 0.4 mg Take 1 capsule (0.4 mg total) by mouth daily with dinner, Starting Wed 6/28/2023, Normal      traZODone (DESYREL) 50 mg tablet Take 50 mg by mouth daily at bedtime bedtime, Starting Fri 11/11/2022, Historical Med           No discharge procedures on file.  ED SEPSIS DOCUMENTATION   Time reflects when diagnosis was documented in both MDM as applicable and the Disposition within this note       Time User Action Codes Description Comment    11/12/2024  4:35 PM Trina Markham Add  [K70.31] Ascites due to alcoholic cirrhosis (HCC)                  Trina Markham,   11/13/24 1954

## 2024-11-12 NOTE — DISCHARGE INSTRUCTIONS
Please follow up with your liver doctor as well as Dr. Marcos as soon as you are able to consider trying to set up scheduled paracentesis. If you develop new fevers, severe abdominal pain, or bleeding please return to the ER for evaluation.

## 2024-11-12 NOTE — TELEPHONE ENCOUNTER
Spoke to patient and patient's wife. Reinforced ED evaluation for reported symptoms. Patient is agreeable and will proceed to [place] ED.

## 2024-11-12 NOTE — ED CARE HANDOFF
Emergency Department Sign Out Note        Sign out and transfer of care from Dr. Markham see Separate Emergency Department note.     The patient, Isael Avendano, was evaluated by the previous provider for abdominal bloating.    Workup Completed:  Labs, paracentesis    ED Course / Workup Pending (followup):  See ED course.                            ED Course as of 11/12/24 2340   Tue Nov 12, 2024   1719 Severe alcoholic cirrhosis, therapeutic paracentesis, 5 L pulled.  Patient repleted with albumin.  Patient offered admission, however does not want to stay.  Passed ambulation trial.  Wife feels comfortable taking patient home.     Procedures  Medical Decision Making  Patient received paracentesis in the ED and pulled about 5 L of ascites.  No concern for SBP at this time.  Patient otherwise does not want to be admitted.  Wife feels comfortable taking care of patient at home.  Patient passed ambulation trial.  Receiving albumin in the ED, will discharge home with close outpatient follow-up and return precautions once albumin is completed.          Amount and/or Complexity of Data Reviewed  Independent Historian: spouse  External Data Reviewed: labs and notes.  Labs: ordered.    Risk  Prescription drug management.            Disposition  Final diagnoses:   Ascites due to alcoholic cirrhosis (HCC)     Time reflects when diagnosis was documented in both MDM as applicable and the Disposition within this note       Time User Action Codes Description Comment    11/12/2024  4:35 PM Trina Markham Add [K70.31] Ascites due to alcoholic cirrhosis (HCC)           ED Disposition       ED Disposition   Discharge    Condition   Stable    Date/Time   Tue Nov 12, 2024  4:35 PM    Comment   Isael Avendano discharge to home/self care.                   Follow-up Information       Follow up With Specialties Details Why Contact Info Additional Information    Collin Marcos MD Internal Medicine Schedule an appointment as soon as possible for a  visit   [address] [facility] [place] [address]  [phone]        Critical access hospital Emergency Department Emergency Medicine Go to  If symptoms worsen [address] [facility] [address] [phone] Critical access hospital Emergency Department, [address] [facility], [place], [place], [address]          Discharge Medication List as of 11/12/2024  7:04 PM        CONTINUE these medications which have NOT CHANGED    Details   bumetanide (BUMEX) 1 mg tablet Take 1 tablet (1 mg total) by mouth daily Do not start before February 29, 2024., Starting Thu 2/29/2024, Normal      glimepiride (AMARYL) 4 mg tablet Take 4 mg by mouth 2 (two) times a day, Starting Wed 3/22/2023, Historical Med      insulin glargine (LANTUS) 100 units/mL subcutaneous injection Inject 40 Units under the skin daily at bedtime, Historical Med      Insulin Pen Needle (BD Pen Needle Nayla 2nd Gen) 32G X 4 MM MISC For use with insulin pen. Pharmacy may dispense brand covered by insurance., Normal      insulin regular (HumuLIN R,NovoLIN R) 100 units/mL injection Inject 10 Units under the skin 3 (three) times a day with meals, Historical Med      lactulose (CHRONULAC) 10 g/15 mL solution Take 20 g by mouth 2 (two) times a day, Starting Fri 6/21/2024, Historical Med      MILK THISTLE PO Take 1 tablet by mouth 2 (two) times a day, Historical Med      Multiple Vitamin (multivitamin) capsule Take 1 capsule by mouth daily, Starting Sun 1/3/2021, Normal      pantoprazole (PROTONIX) 40 mg tablet Take 1 tablet (40 mg total) by mouth daily, Starting Fri 6/14/2024, Until Wed 12/11/2024, Normal      potassium chloride (Klor-Con M20) 20 mEq tablet Take 1 tablet (20 mEq total) by mouth daily Do not start before February 29, 2024., Starting Thu 2/29/2024, Normal      spironolactone (ALDACTONE) 50 mg tablet Take 50 mg by mouth daily, Starting Fri 8/23/2024, Historical Med      tamsulosin (FLOMAX) 0.4 mg Take 1 capsule (0.4 mg  total) by mouth daily with dinner, Starting Wed 6/28/2023, Normal      traZODone (DESYREL) 50 mg tablet Take 50 mg by mouth daily at bedtime bedtime, Starting Fri 11/11/2022, Historical Med           No discharge procedures on file.       ED Provider  Electronically Signed by     Samuel Grigsby DO  11/12/24 9040

## 2024-11-12 NOTE — ED ATTENDING ATTESTATION
11/12/2024  IHumza DO, saw and evaluated the patient. I have discussed the patient with the resident/non-physician practitioner and agree with the resident's/non-physician practitioner's findings, Plan of Care, and MDM as documented in the resident's/non-physician practitioner's note, except where noted. All available labs and Radiology studies were reviewed.  I was present for key portions of any procedure(s) performed by the resident/non-physician practitioner and I was immediately available to provide assistance.       At this point I agree with the current assessment done in the Emergency Department.  I have conducted an independent evaluation of this patient a history and physical is as follows:            1. Ascites due to alcoholic cirrhosis (HCC)            MDM  Number of Diagnoses or Management Options  Ascites due to alcoholic cirrhosis (HCC)  Diagnosis management comments:       Initial ED assessment:   69-year-old male, history of cirrhosis presents with distended abdomen consistent with ascites    Pathology at risk for includes but is not limited to:   Ascites causing hypoventilation syndrome, creating shortness of breath.  Possible electrolyte abnormality as he has been hyponatremic in the past consider thrombocytopenia    Initial ED plan:   Blood work, plan for ED paracentesis plan for discharge        Final ED summary/disposition:   After evaluation and workup in the emergency department,    long discussion with patient due to complicated medical history requiring paracentesis strongly recommend the patient be admitted to the hospital he was adamantly against this family at bedside witnessed this, under no circumstances he said he would stay.,  ED paracentesis completed, drained 4 L, see resident physician note, patient discharged will follow-up outpatient provider               Time reflects when diagnosis was documented in both MDM as applicable and the Disposition within this note        Time User Action Codes Description Comment    11/12/2024  4:35 PM Trina Markham Add [K70.31] Ascites due to alcoholic cirrhosis (HCC)           ED Disposition       ED Disposition   Discharge    Condition   Stable    Date/Time   Tue Nov 12, 2024  4:35 PM    Comment   Isael Avendano discharge to home/self care.                   Follow-up Information       Follow up With Specialties Details Why Contact Info Additional Information    Collin Marcos MD Internal Medicine Schedule an appointment as soon as possible for a visit   [address] [facility] [place] [address]  [phone]        [facility] Emergency Department Emergency Medicine Go to  If symptoms worsen [facility] [id number] [phone] [facility] Emergency Department, [facility] [place], [place], [address]                          Chief Complaint   Patient presents with    Bloated     Abd swelling x2 weeks, pt here for parcentesis hx of same, SOB, abd discomfort; chronic leg swelling                   69-year-old male, history of cirrhosis, presents with increasing abdominal distention and oozing serosanguineous fluid out of previous paracentesis sites.,  Having some trouble breathing, but is able to function his baseline.  No fevers no chills, chronically on ampicillin secondary to spontaneous bacterial peritonitis      History provided by:  Patient                    Physical Exam  Vitals reviewed.   Constitutional:       General: He is not in acute distress.     Appearance: He is well-developed. He is not diaphoretic.   HENT:      Head: Normocephalic and atraumatic.      Right Ear: External ear normal.      Left Ear: External ear normal.      Nose: Nose normal.   Eyes:      General:         Right eye: No discharge.         Left eye: No discharge.      Pupils: Pupils are equal, round, and reactive to light.   Neck:      Trachea: No tracheal deviation.   Cardiovascular:       Rate and Rhythm: Normal rate and regular rhythm.      Heart sounds: Normal heart sounds. No murmur heard.  Pulmonary:      Effort: Pulmonary effort is normal. No respiratory distress.      Breath sounds: Normal breath sounds. No stridor.   Abdominal:      General: There is distension.      Palpations: Abdomen is soft.      Tenderness: There is no abdominal tenderness. There is no guarding or rebound.   Musculoskeletal:         General: Normal range of motion.      Cervical back: Normal range of motion and neck supple.   Skin:     General: Skin is warm and dry.      Coloration: Skin is jaundiced. Skin is not pale.      Findings: No erythema.   Neurological:      General: No focal deficit present.      Mental Status: He is alert and oriented to person, place, and time.                       Medications   albumin human (FLEXBUMIN) 5 % injection 25 g (0 g Intravenous Stopped 11/12/24 1917)             Labs Reviewed   CBC AND DIFFERENTIAL - Abnormal       Result Value Ref Range Status    WBC 7.72  4.31 - 10.16 Thousand/uL Final    RBC 3.26 (*) 3.88 - 5.62 Million/uL Final    Hemoglobin 8.5 (*) 12.0 - 17.0 g/dL Final    Hematocrit 27.6 (*) 36.5 - 49.3 % Final    MCV 85  82 - 98 fL Final    MCH 26.1 (*) 26.8 - 34.3 pg Final    MCHC 30.8 (*) 31.4 - 37.4 g/dL Final    RDW 23.4 (*) 11.6 - 15.1 % Final    Platelets 67 (*) 149 - 390 Thousands/uL Final    Comment: Results verified by repeatManual Review of Smear Performed    nRBC 0  /100 WBCs Final    Segmented % 77 (*) 43 - 75 % Final    Immature Grans % 1  0 - 2 % Final    Lymphocytes % 7 (*) 14 - 44 % Final    Monocytes % 11  4 - 12 % Final    Eosinophils Relative 4  0 - 6 % Final    Basophils Relative 0  0 - 1 % Final    Absolute Neutrophils 6.00  1.85 - 7.62 Thousands/µL Final    Absolute Immature Grans 0.05  0.00 - 0.20 Thousand/uL Final    Absolute Lymphocytes 0.53 (*) 0.60 - 4.47 Thousands/µL Final    Absolute Monocytes 0.83  0.17 - 1.22 Thousand/µL Final     Eosinophils Absolute 0.30  0.00 - 0.61 Thousand/µL Final    Basophils Absolute 0.01  0.00 - 0.10 Thousands/µL Final    Narrative:     This is an appended report.  These results have been appended to a previously verified report.   COMPREHENSIVE METABOLIC PANEL - Abnormal    Sodium 127 (*) 135 - 147 mmol/L Final    Potassium 7.7 (*) 3.5 - 5.3 mmol/L Final    Comment: Slightly Hemolyzed: Results may be affected. RN & Resident  Made aware of Hemolysis.    Chloride 101  96 - 108 mmol/L Final    CO2 22  21 - 32 mmol/L Final    ANION GAP 4  4 - 13 mmol/L Final    BUN 33 (*) 5 - 25 mg/dL Final    Creatinine 1.45 (*) 0.60 - 1.30 mg/dL Final    Comment: Standardized to IDMS reference method    Glucose 220 (*) 65 - 140 mg/dL Final    Comment: If the patient is fasting, the ADA then defines impaired fasting glucose as > 100 mg/dL and diabetes as > or equal to 123 mg/dL.    Calcium 8.0 (*) 8.4 - 10.2 mg/dL Final    Corrected Calcium 9.5  8.3 - 10.1 mg/dL Final    AST 50 (*) 13 - 39 U/L Final    Comment: Slightly Hemolyzed:Results may be affected.    ALT 37  7 - 52 U/L Final    Comment: Specimen collection should occur prior to Sulfasalazine administration due to the potential for falsely depressed results.     Alkaline Phosphatase 264 (*) 34 - 104 U/L Final    Total Protein 5.4 (*) 6.4 - 8.4 g/dL Final    Albumin 2.1 (*) 3.5 - 5.0 g/dL Final    Total Bilirubin 2.24 (*) 0.20 - 1.00 mg/dL Final    Comment: Use of this assay is not recommended for patients undergoing treatment with eltrombopag due to the potential for falsely elevated results.  N-acetyl-p-benzoquinone imine (metabolite of Acetaminophen) will generate erroneously low results in samples for patients that have taken an overdose of Acetaminophen.    eGFR 48  ml/min/1.73sq m Final    Narrative:     National Kidney Disease Foundation guidelines for Chronic Kidney Disease (CKD):     Stage 1 with normal or high GFR (GFR > 90 mL/min/1.73 square meters)    Stage 2 Mild  CKD (GFR = 60-89 mL/min/1.73 square meters)    Stage 3A Moderate CKD (GFR = 45-59 mL/min/1.73 square meters)    Stage 3B Moderate CKD (GFR = 30-44 mL/min/1.73 square meters)    Stage 4 Severe CKD (GFR = 15-29 mL/min/1.73 square meters)    Stage 5 End Stage CKD (GFR <15 mL/min/1.73 square meters)  Note: GFR calculation is accurate only with a steady state creatinine   COMPREHENSIVE METABOLIC PANEL - Abnormal    Sodium 130 (*) 135 - 147 mmol/L Final    Potassium 4.7  3.5 - 5.3 mmol/L Final    Comment: verified by repeat analysis.    Chloride 101  96 - 108 mmol/L Final    CO2 24  21 - 32 mmol/L Final    ANION GAP 5  4 - 13 mmol/L Final    BUN 34 (*) 5 - 25 mg/dL Final    Creatinine 1.50 (*) 0.60 - 1.30 mg/dL Final    Comment: Standardized to IDMS reference method    Glucose 222 (*) 65 - 140 mg/dL Final    Comment: If the patient is fasting, the ADA then defines impaired fasting glucose as > 100 mg/dL and diabetes as > or equal to 123 mg/dL.    Calcium 8.6  8.4 - 10.2 mg/dL Final    Corrected Calcium 10.0  8.3 - 10.1 mg/dL Final    AST 32  13 - 39 U/L Final    ALT 38  7 - 52 U/L Final    Comment: Specimen collection should occur prior to Sulfasalazine administration due to the potential for falsely depressed results.     Alkaline Phosphatase 284 (*) 34 - 104 U/L Final    Total Protein 5.5 (*) 6.4 - 8.4 g/dL Final    Albumin 2.2 (*) 3.5 - 5.0 g/dL Final    Total Bilirubin 2.32 (*) 0.20 - 1.00 mg/dL Final    Comment: Use of this assay is not recommended for patients undergoing treatment with eltrombopag due to the potential for falsely elevated results.  N-acetyl-p-benzoquinone imine (metabolite of Acetaminophen) will generate erroneously low results in samples for patients that have taken an overdose of Acetaminophen.    eGFR 46  ml/min/1.73sq m Final    Narrative:     National Kidney Disease Foundation guidelines for Chronic Kidney Disease (CKD):     Stage 1 with normal or high GFR (GFR > 90 mL/min/1.73 square meters)     Stage 2 Mild CKD (GFR = 60-89 mL/min/1.73 square meters)    Stage 3A Moderate CKD (GFR = 45-59 mL/min/1.73 square meters)    Stage 3B Moderate CKD (GFR = 30-44 mL/min/1.73 square meters)    Stage 4 Severe CKD (GFR = 15-29 mL/min/1.73 square meters)    Stage 5 End Stage CKD (GFR <15 mL/min/1.73 square meters)  Note: GFR calculation is accurate only with a steady state creatinine   SMEAR REVIEW(PHLEBS DO NOT ORDER) - Abnormal    RBC Morphology Present   Final    Platelet Estimate Decreased (*) Adequate Final    Anisocytosis Present   Final    Ovalocytes Present   Final    Poikilocytes Present   Final         No orders to display                  Procedures

## 2024-11-15 PROBLEM — K72.90 DECOMPENSATED CIRRHOSIS (HCC): Status: ACTIVE | Noted: 2024-11-15

## 2024-11-15 PROBLEM — E11.9 DIABETES (HCC): Status: RESOLVED | Noted: 2020-12-25 | Resolved: 2024-11-15

## 2024-11-15 PROBLEM — E87.1 HYPONATREMIA: Status: ACTIVE | Noted: 2024-11-15

## 2024-11-15 PROBLEM — K74.60 DECOMPENSATED CIRRHOSIS (HCC): Status: ACTIVE | Noted: 2024-11-15

## 2024-11-15 PROBLEM — R57.9 SHOCK (HCC): Status: ACTIVE | Noted: 2024-11-15

## 2024-11-15 NOTE — ASSESSMENT & PLAN NOTE
Hx:HCC, prior history of heavy etoh use; Exam: abdominal distention  Labs: Cr 1.67 albumin 2.3 INR 2.01 T bili 2.51, Imaging: n/a    Assessment: Prior history of SBP, not on prophylactics,     Plan:  GI consult  Trend LFT   Continue PTA lactulose 20 g bid  Spironolactone 50 mg qd  Bumex 1 mg bid.

## 2024-11-15 NOTE — ED NOTES
This RN replaced allevyn patch, repositioned patient, sent image of sacrum via rover to AMOR Gunderson. Family left bedside to get food. Patient expressed conformability in hospital stretcher at this time.      Yue Villaseñor RN  11/15/24 [id number]

## 2024-11-15 NOTE — PLAN OF CARE
Problem: Potential for Falls  Goal: Patient will remain free of falls  Description: INTERVENTIONS:  - Educate patient/family on patient safety including physical limitations  - Instruct patient to call for assistance with activity   - Consult OT/PT to assist with strengthening/mobility   - Keep Call bell within reach  - Keep bed low and locked with side rails adjusted as appropriate  - Keep care items and personal belongings within reach  - Initiate and maintain comfort rounds  - Make Fall Risk Sign visible to staff  - Offer Toileting every 2 Hours, in advance of need  - Initiate/Maintain bed alarm  - Apply yellow socks and bracelet for high fall risk patients  - Consider moving patient to room near nurses station  11/15/2024 1843 by Zoie Cortez RN  Outcome: Progressing  11/15/2024 1843 by Zoie Cortez RN  Outcome: Progressing     Problem: Prexisting or High Potential for Compromised Skin Integrity  Goal: Skin integrity is maintained or improved  Description: INTERVENTIONS:  - Identify patients at risk for skin breakdown  - Assess and monitor skin integrity  - Assess and monitor nutrition and hydration status  - Monitor labs   - Assess for incontinence   - Turn and reposition patient  - Assist with mobility/ambulation  - Relieve pressure over bony prominences  - Avoid friction and shearing  - Provide appropriate hygiene as needed including keeping skin clean and dry  - Evaluate need for skin moisturizer/barrier cream  - Collaborate with interdisciplinary team   - Patient/family teaching  - Consider wound care consult   11/15/2024 1843 by Zoie Cortez RN  Outcome: Progressing  11/15/2024 1843 by Zoie Cortez RN  Outcome: Not Progressing     Problem: PAIN - ADULT  Goal: Verbalizes/displays adequate comfort level or baseline comfort level  Description: Interventions:  - Encourage patient to monitor pain and request assistance  - Assess pain using appropriate pain scale  - Administer analgesics based on type and  severity of pain and evaluate response  - Implement non-pharmacological measures as appropriate and evaluate response  - Consider cultural and social influences on pain and pain management  - Notify physician/advanced practitioner if interventions unsuccessful or patient reports new pain  Outcome: Progressing     Problem: INFECTION - ADULT  Goal: Absence or prevention of progression during hospitalization  Description: INTERVENTIONS:  - Assess and monitor for signs and symptoms of infection  - Monitor lab/diagnostic results  - Monitor all insertion sites, i.e. indwelling lines, tubes, and drains  - Monitor endotracheal if appropriate and nasal secretions for changes in amount and color  - [place] appropriate cooling/warming therapies per order  - Administer medications as ordered  - Instruct and encourage patient and family to use good hand hygiene technique  - Identify and instruct in appropriate isolation precautions for identified infection/condition  Outcome: Progressing  Goal: Absence of fever/infection during neutropenic period  Description: INTERVENTIONS:  - Monitor WBC    Outcome: Progressing     Problem: SAFETY ADULT  Goal: Patient will remain free of falls  Description: INTERVENTIONS:  - Educate patient/family on patient safety including physical limitations  - Instruct patient to call for assistance with activity   - Consult OT/PT to assist with strengthening/mobility   - Keep Call bell within reach  - Keep bed low and locked with side rails adjusted as appropriate  - Keep care items and personal belongings within reach  - Initiate and maintain comfort rounds  - Make Fall Risk Sign visible to staff  - Offer Toileting every 2 Hours, in advance of need  - Initiate/Maintain bed alarm  - Apply yellow socks and bracelet for high fall risk patients  - Consider moving patient to room near nurses station  11/15/2024 1843 by Zoie Cortez RN  Outcome: Progressing  11/15/2024 1843 by Zoie Cortez RN  Outcome:  Progressing  Goal: Maintain or return to baseline ADL function  Description: INTERVENTIONS:  -  Assess patient's ability to carry out ADLs; assess patient's baseline for ADL function and identify physical deficits which impact ability to perform ADLs (bathing, care of mouth/teeth, toileting, grooming, dressing, etc.)  - Assess/evaluate cause of self-care deficits   - Assess range of motion  - Assess patient's mobility; develop plan if impaired  - Assess patient's need for assistive devices and provide as appropriate  - Encourage maximum independence but intervene and supervise when necessary  - Involve family in performance of ADLs  - Assess for home care needs following discharge   - Consider OT consult to assist with ADL evaluation and planning for discharge  - Provide patient education as appropriate  Outcome: Progressing  Goal: Maintains/Returns to pre admission functional level  Description: INTERVENTIONS:  - Perform AM-PAC 6 Click Basic Mobility/ Daily Activity assessment daily.  - Set and communicate daily mobility goal to care team and patient/family/caregiver.   - Collaborate with rehabilitation services on mobility goals if consulted  - Perform Range of Motion 3 times a day.  - Reposition patient every 2 hours.  - Dangle patient 3 times a day  - Stand patient 3 times a day  - Ambulate patient 3 times a day  - Out of bed to chair 3 times a day   - Out of bed for meals 3 times a day  - Out of bed for toileting  - Record patient progress and toleration of activity level   Outcome: Progressing     Problem: DISCHARGE PLANNING  Goal: Discharge to home or other facility with appropriate resources  Description: INTERVENTIONS:  - Identify barriers to discharge w/patient and caregiver  - Arrange for needed discharge resources and transportation as appropriate  - Identify discharge learning needs (meds, wound care, etc.)  - Arrange for interpretive services to assist at discharge as needed  - Refer to Case Management  Department for coordinating discharge planning if the patient needs post-hospital services based on physician/advanced practitioner order or complex needs related to functional status, cognitive ability, or social support system  Outcome: Progressing     Problem: Knowledge Deficit  Goal: Patient/family/caregiver demonstrates understanding of disease process, treatment plan, medications, and discharge instructions  Description: Complete learning assessment and assess knowledge base.  Interventions:  - Provide teaching at level of understanding  - Provide teaching via preferred learning methods  Outcome: Progressing

## 2024-11-15 NOTE — H&P
H&P - Critical Care/ICU   Name: Isael Avendano 69 y.o. male I MRN: [id number]  Unit/Bed#: ED-36 I Date of Admission: 11/15/2024   Date of Service: 11/15/2024 I Hospital Day: 0       Assessment & Plan  Shock (HCC)  Hx:significant for decompensated cirrhosis, ?SBP, cellulitis; Exam: Left leg swelling  Labs: lactic acid 3.0, wbc 10.04 (>50% from base line), body fluid neutrophil 238.6 , BUN 39, CR 1. 67 Imaging: Cxr - increased interstitial markings    Assessment: Multifactorial, intravascular depletion with pre renal pattern abilio, and examines dry on exam, compounded by active infection, with elevated procal and wbc significant elevation from baseline, source could be leg leg cellulitis and sbp,   Abx with ampicillin was not sufficient for sbp prophylactics.     Plan:  Cefepime 2 g q8  Albumin 25   Pending Bcx    Decompensated cirrhosis (HCC)  Hx:HCC, prior history of heavy etoh use; Exam: abdominal distention  Labs: Cr 1.67 albumin 2.3 INR 2.01 T bili 2.51, Imaging: n/a    Assessment: Prior history of SBP, not on prophylactics,     Plan:  GI consult  Trend LFT   Continue PTA lactulose 20 g bid  Spironolactone 50 mg qd  Bumex 1 mg bid.  Diabetes (HCC)  Lab Results   Component Value Date    HGBA1C 6.7 (H) 10/23/2024       Recent Labs     11/15/24  0952   POCGLU 182*       Blood Sugar Average: Last 72 hrs:  (P) 182    Assessment:   Sliding scale per acu check    Hyponatremia  Na: 130    Suspect secondary to decompensated cirrhosis, third spacing.    See problems above.  ABILIO (acute kidney injury) (HCC)  Pre- Renal pattern, from shock     See problems above.  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   Risk factor of varices, active cancer.     Disposition: Critical care    History of Present Illness   Isael Avendano is a 69 y.o. who presents abdominal fullness and 2-day onset of left extremity tenderness.  Patient was discharged from department after having paracentesis for ascites, in which 5 L was  removed, albumin was subsequently given.     Patient return to this admission for complaint of abdominal fullness and left lower extremity tenderness. However patient denies any fever chills, abdominal pain.      Patient was found to be hypotensive SBP in the 86, requiring pressor.     Initial workup includes sodium 130, potassium 4.6, BUN 39, creatinine 1.67 AST and ALT 34, alk phos 250, T. bili 2.51 CBC showing white blood cell 10.04, hemoglobin 8.9 body fluid differential showing 84 neutrophils white blood cell level 284,     Patient was given albumin 5 percent injection 25 g, zosyn and vanco, epi at 6, normal saline 1250 mL.     Was recently hospitalized for SBP 10/23. 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 Finished his last does of Abx on 11/14.    Sees cardiologist last seen in July 8.  EF of 65%, normal wall motion,    Sees Hepatologist on 8/23/24 -        EGD on 8/15   impression   Two tortuous varices in the upper third of the esophagus  Moderate and mosaic portal hypertensive gastropathy in the cardia and fundus of the stomach  The duodenal bulb and 2nd part of the duodenum appeared normal.  Irregular Z-line     Sees heme onocology  Diagnosed with hepatocellular carcinoma on July 28, 2024, initiated chemotherapy on October 1 with tremelimumab/durvalumab at [facility] and on October 1.    History of pancytopenia likely hypersplenism and iron deficiency anemia due to varices, more recent iron panel in August 8/20/24 showing ferritin 20, tibc 397, started iron transfusion.     Socially Three fourths of a pack of smoker for 20 years quit in 1993    3 drinks of rum per night for 40 years last drink was August 2022      History obtained from spouse and child, chart review, and the patient.  Review of Systems: See HPI for Review of  Systems    Historical Information   Past Medical History:  12/25/2020: ABILIO (acute kidney injury) (HCC)  No date: Arrhythmia  No date: Chronic kidney disease  No date: Colon polyp  12/2020: COVID  No date: CPAP (continuous positive airway pressure) dependence  No date: Diabetes mellitus (HCC)  11/14/2017: History of echocardiogram      Comment:  showed EF of 50-55 percentWith moderate LVH and left                ventricle diastolic dysfunction. Left atrium was                moderately enlarged. Trace MR noted.   11/21/2017: History of Holter monitoring      Comment:  showed baseline rhythm of sinus origin with an average                heart it of 61 bpm. The lowest heart rate was 49 and the                highest heart rate was 10 8 bpm. There were rare single                VPCs, and frequent PACs representing 3.2% of total beats.               There were several episodes of sinus arrhythmias with                sinus bradycardia and heart rate ranging from 40-90 bpm.                No sustained dysrhythmias, or pauses noted. The patient                did not  04/2023: History of transfusion      Comment:  platelets  No date: Hypertension  No date: Liver disease      Comment:  cirhosis  12/08/2022: Morbid obesity (HCC)  No date: Murmur, cardiac  No date: Obese  12/08/2022: Obesity, morbid (HCC)  No date: Sleep apnea Past Surgical History:  No date: CATARACT EXTRACTION  1997: EYE SURGERY; Left  8/28/2023: FL GUIDED NEEDLE PLAC BX/ASP/INJ  [id number]: HERNIA REPAIR  6/28/2024: IR BIOPSY LIVER MASS  2/19/2024: IR PARACENTESIS  2/21/2024: IR PARACENTESIS  No date: JOINT REPLACEMENT; Right      Comment:  TKR  2008: KNEE ARTHROPLASTY; Right  04/04/2023: MI ARTHROPLASTY GLENOHUMERAL JOINT TOTAL SHOULDER; Left      Comment:  Procedure: ARTHROPLASTY SHOULDER REVERSE;  Surgeon:                Marcelo Lester MD;  Location: [facility];                 Service: Orthopedics  9/8/2023: MI JARED SHOULDER ARTHRPLSTY  HUMERAL&GLENOID COMPNT; Left      Comment:  Procedure: removal of antibiotic spacer, incision and                debridement,  with revision reverse shoulder                arthroplasty;  Surgeon: Lita Aguilar;  Location:  MAIN                OR;  Service: Orthopedics  06/13/2023: SC JARED SHOULDER ARTHRPLSTY HUMERAL/GLENOID COMPNT; Left      Comment:  Procedure: ARTHROPLASTY SHOULDER REVISION;  Surgeon:                Lita Aguilar;  Location: [facility];  Service:                Orthopedics  2002: SHOULDER SURGERY; Right  05/02/2023: WOUND DEBRIDEMENT; Left      Comment:  Procedure: INCISION AND DRAINAGE (I&D) EXTREMITY, vac                placement;  Surgeon: Marcelo Lester MD;                 Location: [facility];  Service: Orthopedics   Current Outpatient Medications   Medication Instructions    bumetanide (BUMEX) 1 mg, Oral, Daily    glimepiride (AMARYL) 4 mg, 2 times daily    insulin glargine (LANTUS) 40 Units, Daily at bedtime    Insulin Pen Needle (BD Pen Needle Nayla 2nd Gen) 32G X 4 MM MISC For use with insulin pen. Pharmacy may dispense brand covered by insurance.    insulin regular (HUMULIN R,NOVOLIN R) 10 Units, 3 times daily with meals    lactulose (CHRONULAC) 20 g, Oral, 2 times daily    MILK THISTLE PO 1 tablet, Oral, 2 times daily    Multiple Vitamin (multivitamin) capsule 1 capsule, Oral, Daily    pantoprazole (PROTONIX) 40 mg, Oral, Daily    potassium chloride (Klor-Con M20) 20 mEq tablet 20 mEq, Oral, Daily    spironolactone (ALDACTONE) 50 mg, Oral, Daily    tamsulosin (FLOMAX) 0.4 mg, Oral, Daily with dinner    traZODone (DESYREL) 50 mg, Oral, Daily at bedtime, bedtime    Allergies   Allergen Reactions    Sulfa Antibiotics Hives    Daptomycin Other (See Comments)     Possibly contributed to ABILIO on 6/2023 admission       Social History     Tobacco Use    Smoking status: Former     Current packs/day: 0.00     Average packs/day: 0.5 packs/day for 20.0 years (10.0 ttl pk-yrs)     Types:  Cigarettes     Start date:      Quit date:      Years since quittin.8    Smokeless tobacco: Never   Vaping Use    Vaping status: Never Used   Substance Use Topics    Alcohol use: Not Currently     Comment: quit     Drug use: Never    Family History   Problem Relation Age of Onset    Diabetes Mother     Supraventricular tachycardia Mother     COPD Father     Heart disease Father     Other Father         Sepsis; related to UTI with complicating cardiac problems    Heart disease Paternal Grandmother     Prostate cancer Paternal Grandfather 82          Objective :                   Vitals I/O      Most Recent Min/Max in 24hrs   Temp 99.3 °F (37.4 °C) Temp  Min: 99.3 °F (37.4 °C)  Max: 99.3 °F (37.4 °C)   Pulse 105 Pulse  Min: 102  Max: 112   Resp (!) 24 Resp  Min: 20  Max: 24   BP 92/53 BP  Min: 80/47  Max: 107/54   O2 Sat 99 % SpO2  Min: 92 %  Max: 100 %      Intake/Output Summary (Last 24 hours) at 11/15/2024 1510  Last data filed at 11/15/2024 1105  Gross per 24 hour   Intake 3350 ml   Output --   Net 3350 ml       No diet orders on file    Invasive Monitoring           Physical Exam   Physical Exam  Vitals reviewed.   Eyes:      Extraocular Movements: Extraocular movements intact.   Skin:     General: Skin is warm.   HENT:      Mouth/Throat:      Mouth: Mucous membranes are dry.   Cardiovascular:      Rate and Rhythm: Regular rhythm. Tachycardia present.      Heart sounds: Normal heart sounds.   Musculoskeletal:      Right lower leg: Edema present.      Left lower leg: Edema present.      Comments: Erythematous, in the left leg spanning from inner thigh to knee  Tenderness to palpation   Chronic venous insufficiency skin changes in BL shin  Left foot ulcer at the sole of the foot,   Sacral ulcer   Abdominal: General: There is distension.     Tenderness: There is abdominal tenderness (generalized tenderness to deep palpation, no rebound). There is no guarding.   Constitutional:       Appearance: He  is ill-appearing.   Pulmonary:      Effort: Pulmonary effort is normal.      Breath sounds: Normal breath sounds.   Neurological:      Mental Status: He is calm.      Comments: Generalized weakness, UE strength > LE  Intentional tremor  and Lethergic            Diagnostic Studies        Lab Results: I have reviewed the following results:     Medications:  Scheduled PRN          Continuous    norepinephrine, 1-30 mcg/min, Last Rate: 6 mcg/min (11/15/24 1109)         Labs:   CBC    Recent Labs     11/15/24  0737   WBC 10.04   HGB 8.9*   HCT 28.5*   PLT 70*     BMP    Recent Labs     11/15/24  1216   SODIUM 130*   K 4.6      CO2 21   AGAP 8   BUN 39*   CREATININE 1.67*   CALCIUM 8.2*       Coags    Recent Labs     11/15/24  0737   INR 2.01*   PTT 42*        Additional Electrolytes  No recent results       Blood Gas    No recent results  No recent results LFTs  Recent Labs     11/15/24  1216   ALT 34   AST 30   ALKPHOS 259*   ALB 2.3*   TBILI 2.51*       Infectious  Recent Labs     11/15/24  0737   PROCALCITONI 3.19*     Glucose  Recent Labs     11/15/24  1216   GLUC 171*

## 2024-11-15 NOTE — ED NOTES
"Patient rang call bell, this RN and tech responded. Patient c/o wounds weeping. Pitting edema noted throughout patients body. Wounds \"tearing\". Allevyn patch noted to sacrum. Patient c/o pain in sacrum. Patient repositioned, sheets and gown changed. Two wedges put under patient on R side of body, pillow positioned under patients feet for support. Patient states 8/10 pain. No PRN meds ordered at this time, this RN to consult with MD. Patient does express more conformability after being repositioned with wedges. Levo adjusted to 6 mcg/min. BP 99/52 with a MAP of 72 at this time. Patient requesting ice chips. Patient reminded of the need for urine sample, urinal placed at patients side. Patient states he has not urinated since   this morning          Yue Villaseñor RN  11/15/24 1156    "

## 2024-11-15 NOTE — ASSESSMENT & PLAN NOTE
Lab Results   Component Value Date    HGBA1C 6.7 (H) 10/23/2024       Recent Labs     11/15/24  0952   POCGLU 182*       Blood Sugar Average: Last 72 hrs:  (P) 182    Assessment:   Sliding scale per acu check

## 2024-11-15 NOTE — ASSESSMENT & PLAN NOTE
Hx:significant for decompensated cirrhosis, ?SBP, cellulitis; Exam: Left leg swelling  Labs: lactic acid 3.0, wbc 10.04 (>50% from base line), body fluid neutrophil 238.6 , BUN 39, CR 1. 67 Imaging: Cxr - increased interstitial markings    Assessment: Multifactorial, intravascular depletion with pre renal pattern messi, and examines dry on exam, compounded by active infection, with elevated procal and wbc significant elevation from baseline, source could be leg leg cellulitis and sbp,   Abx with ampicillin was not sufficient for sbp prophylactics.     Plan:  Cefepime 2 g q8  Albumin 25   Pending Bcx

## 2024-11-15 NOTE — ED PROVIDER NOTES
Time reflects when diagnosis was documented in both MDM as applicable and the Disposition within this note       Time User Action Codes Description Comment    11/15/2024  8:38 AM KoCali abraham Add [K65.2] SBP (spontaneous bacterial peritonitis) (HCC)     11/15/2024  8:39 AM Kojarad, Cali Add [A41.9,  R65.21] Septic shock (HCC)     11/15/2024  8:42 AM KoCali abraham Modify [K65.2] SBP (spontaneous bacterial peritonitis) (HCC)     11/15/2024  8:42 AM Kotil, Cali Modify [A41.9,  R65.21] Septic shock (HCC)     11/15/2024  8:42 AM Kotil, Cali Add [L03.90] Cellulitis     11/15/2024  8:43 AM Kojarad, Cali Add [D64.9] Anemia     11/15/2024  8:43 AM Kojarad, Cali Add [D69.6] Thrombocytopenia (HCC)     11/15/2024  9:37 AM KoCali abraham Add [E72.20] Hyperammonemia (HCC)           ED Disposition       ED Disposition   Admit    Condition   Stable    Date/Time   Fri Nov 15, 2024 12:07 PM    Comment   Case was discussed with Dr. Kraus and the patient's admission status was agreed to be Admission Status: inpatient status to the service of Dr. Kraus .               Assessment & Plan       Medical Decision Making  69y.o M presenting with significant edema likely 2/2 his cirrhosis. Exam notable for hypotension, abdominal pain, and cellulitis of the left thigh. Pt was determined to be in septic shock for which 30cc/kg IV fluids were given and IV abx started. His likely source is cellulitis and possible SBP. Diagnostic paracentesis was done at bedside, fluid studies pending. Despite IV fluids he remained hypotensive, necessitating Norepi, which maintained his MAP >65. No signs of acute respiratory distress or pulmonary edema on exam. I suspect his hypotension is multifactorial- shock, hepatorenal syndrome. Anemia likely of chronic dz. Although his NH4 was elevated, his mentation was wnl, likely indicating chronic elevation. Therefore, lactulose was deferred at this time. Lasix was deferred d/t Pt being  hypotensive and not in respiratory distress. Given his pressor requirement, critical care was consulted who agreed to take him under their service. Goals of care discussion was made with him and the family. Although he does not want intubation or CPR, he was uncertain if he wanted comfort measures at this time. Nevertheless, it is something him and his family are considering.     Amount and/or Complexity of Data Reviewed  Labs: ordered. Decision-making details documented in ED Course.    Risk  Prescription drug management.  Decision regarding hospitalization.        ED Course as of 11/15/24 1218   Fri Nov 15, 2024   0736 Ddx includes but not limited to SBP, hepatorenal syndrome, acute-on-chronic ascites, UTI, pancreatitis.    0841 Hemoglobin(!): 8.9  Stable         Medications   NOREPINEPHRINE 4 MG  ML NSS (CMPD ORDER) infusion (6 mcg/min Intravenous Rate/Dose Change 11/15/24 1109)   albumin human (FLEXBUMIN) 5 % injection 25 g (0 g Intravenous Stopped 11/15/24 0843)   piperacillin-tazobactam (ZOSYN) IVPB 4.5 g (0 g Intravenous Stopped 11/15/24 0807)   fentaNYL injection 50 mcg (50 mcg Intravenous Given 11/15/24 0813)   multi-electrolyte (ISOLYTE-S PH 7.4) bolus 1,000 mL (0 mL Intravenous Stopped 11/15/24 0932)   vancomycin (VANCOCIN) 2,000 mg in sodium chloride 0.9 % 500 mL IVPB (0 mg Intravenous Stopped 11/15/24 1105)   sodium chloride 0.9 % bolus 250 mL (0 mL Intravenous Stopped 11/15/24 1016)   sodium chloride 0.9 % bolus 1,000 mL (0 mL Intravenous Stopped 11/15/24 1016)   fentaNYL injection 50 mcg (50 mcg Intravenous Given 11/15/24 0941)       ED Risk Strat Scores                           SBIRT 20yo+      Flowsheet Row Most Recent Value   Initial Alcohol Screen: US AUDIT-C     1. How often do you have a drink containing alcohol? 0 Filed at: 11/15/2024 0747   2. How many drinks containing alcohol do you have on a typical day you are drinking?  0 Filed at: 11/15/2024 0747   3b. FEMALE Any Age, or MALE 65+:  How often do you have 4 or more drinks on one occassion? 0 Filed at: 11/15/2024 0747   Audit-C Score 0 Filed at: 11/15/2024 0761   JERRY: How many times in the past year have you...    Used an illegal drug or used a prescription medication for non-medical reasons? Never Filed at: 11/15/2024 0735                            History of Present Illness       Chief Complaint   Patient presents with    Leg Swelling     Pt from home via [facility] complaints of bilateral lower extremity swelling        Past Medical History:   Diagnosis Date    ABILIO (acute kidney injury) (HCC) 12/25/2020    Arrhythmia     Chronic kidney disease     Colon polyp     COVID 12/2020    CPAP (continuous positive airway pressure) dependence     Diabetes mellitus (HCC)     History of echocardiogram 11/14/2017    showed EF of 50-55 percentWith moderate LVH and left ventricle diastolic dysfunction. Left atrium was moderately enlarged. Trace MR noted.     History of Holter monitoring 11/21/2017    showed baseline rhythm of sinus origin with an average heart it of 61 bpm. The lowest heart rate was 49 and the highest heart rate was 10 8 bpm. There were rare single VPCs, and frequent PACs representing 3.2% of total beats. There were several episodes of sinus arrhythmias with sinus bradycardia and heart rate ranging from 40-90 bpm. No sustained dysrhythmias, or pauses noted. The patient did not    History of transfusion 04/2023    platelets    Hypertension     Liver disease     cirhosis    Morbid obesity (HCC) 12/08/2022    Murmur, cardiac     Obese     Obesity, morbid (HCC) 12/08/2022    Sleep apnea       Past Surgical History:   Procedure Laterality Date    CATARACT EXTRACTION      EYE SURGERY Left 1997    FL GUIDED NEEDLE PLAC BX/ASP/INJ  8/28/2023    HERNIA REPAIR  [id number]    IR BIOPSY LIVER MASS  6/28/2024    IR PARACENTESIS  2/19/2024    IR PARACENTESIS  2/21/2024    JOINT REPLACEMENT Right     TKR    KNEE ARTHROPLASTY Right 2008    AL ARTHROPLASTY  GLENOHUMERAL JOINT TOTAL SHOULDER Left 2023    Procedure: ARTHROPLASTY SHOULDER REVERSE;  Surgeon: Marcelo Lester MD;  Location: [facility];  Service: Orthopedics    OK JARED SHOULDER ARTHRPLSTY HUMERAL&GLENOID COMPNT Left 2023    Procedure: removal of antibiotic spacer, incision and debridement,  with revision reverse shoulder arthroplasty;  Surgeon: Lita Aguilar;  Location: [facility];  Service: Orthopedics    OK JARED SHOULDER ARTHRPLSTY HUMERAL/GLENOID COMPNT Left 2023    Procedure: ARTHROPLASTY SHOULDER REVISION;  Surgeon: Lita Aguilar;  Location: [facility];  Service: Orthopedics    SHOULDER SURGERY Right 2002    WOUND DEBRIDEMENT Left 2023    Procedure: INCISION AND DRAINAGE (I&D) EXTREMITY, vac placement;  Surgeon: Marcelo Lester MD;  Location: [facility];  Service: Orthopedics      Family History   Problem Relation Age of Onset    Diabetes Mother     Supraventricular tachycardia Mother     COPD Father     Heart disease Father     Other Father         Sepsis; related to UTI with complicating cardiac problems    Heart disease Paternal Grandmother     Prostate cancer Paternal Grandfather 82      Social History     Tobacco Use    Smoking status: Former     Current packs/day: 0.00     Average packs/day: 0.5 packs/day for 20.0 years (10.0 ttl pk-yrs)     Types: Cigarettes     Start date:      Quit date:      Years since quittin.8    Smokeless tobacco: Never   Vaping Use    Vaping status: Never Used   Substance Use Topics    Alcohol use: Not Currently     Comment: quit     Drug use: Never      E-Cigarette/Vaping    E-Cigarette Use Never User       E-Cigarette/Vaping Substances    Nicotine No     THC No     CBD No     Flavoring No     Other No     Unknown No       I have reviewed and agree with the history as documented.     69y.o M w/ h/o EtOH-induced cirrhosis with recurrent episodes of ascites requiring ED visits for brady presenting for edema. For the  past week he's had worsening full body edema with associated weakness. Was on ampicillin for several days via his picc line, however, this was d/c'd last night. Denies fevers, N/V/D, hematochezia, hematemesis, CP, SOB, cough.      History provided by:  Patient and relative      Review of Systems   Constitutional:  Positive for fatigue. Negative for activity change, appetite change, chills and fever.   Respiratory:  Negative for cough and shortness of breath.    Cardiovascular:  Positive for leg swelling. Negative for chest pain and palpitations.   Gastrointestinal:  Positive for abdominal distention and abdominal pain. Negative for blood in stool, constipation, diarrhea, nausea and vomiting.   Neurological:  Positive for weakness.           Objective       ED Triage Vitals   Temperature Pulse Blood Pressure Respirations SpO2 Patient Position - Orthostatic VS   11/15/24 0734 11/15/24 0709 11/15/24 0710 11/15/24 0709 11/15/24 0709 11/15/24 0709   99.3 °F (37.4 °C) (!) 112 (!) 86/54 20 92 % Lying      Temp Source Heart Rate Source BP Location FiO2 (%) Pain Score    11/15/24 0734 11/15/24 0709 11/15/24 0709 -- 11/15/24 0941    Oral Monitor Right arm  10 - Worst Possible Pain      Vitals      Date and Time Temp Pulse SpO2 Resp BP Pain Score FACES Pain Rating User   11/15/24 1200 -- 109 99 % 24 98/55 -- -- CR   11/15/24 1145 -- 104 97 % 24 102/56 -- -- CR   11/15/24 1130 -- 105 98 % 24 106/53 -- -- CR   11/15/24 1123 -- -- -- -- -- 8 -- CR   11/15/24 1115 -- 106 98 % 24 99/52 -- -- CR   11/15/24 1109 -- 106 -- -- 90/50 -- -- CR   11/15/24 1100 -- 105 99 % 24 90/50 -- -- CR   11/15/24 1045 -- 104 98 % -- 94/56 -- -- MM   11/15/24 1030 -- 102 98 % -- 91/53 -- -- MM   11/15/24 1015 -- 105 98 % -- 99/51 -- -- MM   11/15/24 1000 -- 106 98 % -- 90/51 -- -- MM   11/15/24 0945 -- 107 99 % -- 83/47 -- -- MM   11/15/24 0941 -- -- -- -- -- 10 - Worst Possible Pain -- MM   11/15/24 0930 -- 105 99 % --  80/47 provider aware -- -- MM    11/15/24 0915 -- 110 -- -- 90/52 -- -- MM   11/15/24 0900 -- 107 98 % -- 99/54 -- -- MM   11/15/24 0845 -- 107 99 % -- 94/51 -- -- MM   11/15/24 0830 -- 109 100 % -- 89/50 -- -- MM   11/15/24 0815 -- 108 99 % -- 84/50 -- -- MM   11/15/24 0745 -- 109 99 % 24 86/53 -- -- MM   11/15/24 0734 99.3 °F (37.4 °C) -- -- -- -- -- -- MM   11/15/24 0715 -- 111 99 % -- 85/49 -- -- MM   11/15/24 0710 -- -- -- -- 86/54 -- -- MM   11/15/24 0709 -- 112 92 % 20 -- -- -- MM            Physical Exam  Constitutional:       General: He is in acute distress.   HENT:      Mouth/Throat:      Mouth: Mucous membranes are moist.      Pharynx: Oropharynx is clear.   Eyes:      Extraocular Movements: Extraocular movements intact.      Conjunctiva/sclera: Conjunctivae normal.   Cardiovascular:      Rate and Rhythm: Regular rhythm. Tachycardia present.      Pulses: Normal pulses.      Heart sounds: Normal heart sounds.   Pulmonary:      Effort: Pulmonary effort is normal.      Breath sounds: Normal breath sounds.   Abdominal:      General: There is distension.      Tenderness: There is abdominal tenderness (diffuse). There is no rebound.   Musculoskeletal:         General: No tenderness.      Right lower leg: Edema present.      Left lower leg: Edema present.      Comments: Edema of all extremities and genitals.   Skin:     General: Skin is warm and dry.      Capillary Refill: Capillary refill takes less than 2 seconds.      Comments: Left anterior thigh erythema, tenderness, and warmth.   Neurological:      General: No focal deficit present.      Mental Status: He is alert and oriented to person, place, and time.   Psychiatric:         Mood and Affect: Mood normal.         Behavior: Behavior normal.         Results Reviewed       Procedure Component Value Units Date/Time    Comprehensive metabolic panel [[id number]] Collected: 11/15/24 1216    Lab Status: No result Specimen: Blood from Arm, Left     Body Fluid Diff [[id number]] Collected:  11/15/24 0952    Lab Status: Final result Specimen: Body Fluid from Paracentesis Updated: 11/15/24 1203     Total Counted 100     Neutrophils % (Fluid) 84 %      Lymphs % (Fluid) 7 %      Bands % (Fluid) 2 %      Histiocyte % (Fluid) 7 %     Body fluid white cell count with differential [[id number]] Collected: 11/15/24 0952    Lab Status: Final result Specimen: Body Fluid from Paracentesis Updated: 11/15/24 1055     Site Paracentesis     Color, Fluid Light Yellow     Clarity, Fluid Slightly Cloudy     WBC, Fluid 284 /ul     Lactic acid 2 Hours [[id number]]  (Abnormal) Collected: 11/15/24 0942    Lab Status: Final result Specimen: Blood from Arm, Left Updated: 11/15/24 1011     LACTIC ACID 3.0 mmol/L     Narrative:      Result may be elevated if tourniquet was used during collection.    Body fluid culture and Gram stain [[id number]] Collected: 11/15/24 0952    Lab Status: In process Specimen: Body Fluid from Paracentesis Updated: 11/15/24 1001    Fingerstick Glucose (POCT) [[id number]]  (Abnormal) Collected: 11/15/24 0952    Lab Status: Final result Specimen: Blood Updated: 11/15/24 0956     POC Glucose 182 mg/dl     Comprehensive metabolic panel [[id number]] Collected: 11/15/24 0955    Lab Status: No result Specimen: Blood from Arm, Right Updated: 11/15/24 0955    Lipase [[id number]] Collected: 11/15/24 0955    Lab Status: No result Specimen: Blood from Arm, Right Updated: 11/15/24 0955    Magnesium [[id number]] Collected: 11/15/24 0955    Lab Status: No result Specimen: Blood from Arm, Right Updated: 11/15/24 0955    Ammonia [[id number]]  (Abnormal) Collected: 11/15/24 0846    Lab Status: Final result Specimen: Blood from Arm, Left Updated: 11/15/24 0936     Ammonia 74 umol/L     Procalcitonin [[id number]]  (Abnormal) Collected: 11/15/24 0737    Lab Status: Final result Specimen: Blood from Arm, Left Updated: 11/15/24 0822     Procalcitonin 3.19 ng/ml     Protime-INR [[id number]]  (Abnormal) Collected: 11/15/24 0737    Lab  Status: Final result Specimen: Blood from Arm, Left Updated: 11/15/24 0821     Protime 23.5 seconds      INR 2.01    Narrative:      INR Therapeutic Range    Indication                                             INR Range      Atrial Fibrillation                                               2.0-3.0  Hypercoagulable State                                    2.0.2.3  Left Ventricular Asist Device                            2.0-3.0  Mechanical Heart Valve                                  -    Aortic(with afib, MI, embolism, HF, LA enlargement,    and/or coagulopathy)                                     2.0-3.0 (2.5-3.5)     Mitral                                                             2.5-3.5  Prosthetic/Bioprosthetic Heart Valve               2.0-3.0  Venous thromboembolism (VTE: VT, PE        2.0-3.0    APTT [[id number]]  (Abnormal) Collected: 11/15/24 0737    Lab Status: Final result Specimen: Blood from Arm, Left Updated: 11/15/24 0821     PTT 42 seconds     Lactic acid, plasma (w/reflex if result > 2.0) [[id number]]  (Abnormal) Collected: 11/15/24 0737    Lab Status: Final result Specimen: Blood from Arm, Left Updated: 11/15/24 0813     LACTIC ACID 3.2 mmol/L     Narrative:      Result may be elevated if tourniquet was used during collection.    CBC and differential [[id number]]  (Abnormal) Collected: 11/15/24 0737    Lab Status: Final result Specimen: Blood from Arm, Left Updated: 11/15/24 0800     WBC 10.04 Thousand/uL      RBC 3.45 Million/uL      Hemoglobin 8.9 g/dL      Hematocrit 28.5 %      MCV 83 fL      MCH 25.8 pg      MCHC 31.2 g/dL      RDW 22.2 %      Platelets 70 Thousands/uL      nRBC 0 /100 WBCs      Segmented % 89 %      Immature Grans % 0 %      Lymphocytes % 6 %      Monocytes % 4 %      Eosinophils Relative 1 %      Basophils Relative 0 %      Absolute Neutrophils 8.91 Thousands/µL      Absolute Immature Grans 0.03 Thousand/uL      Absolute Lymphocytes 0.58 Thousands/µL      Absolute  Monocytes 0.40 Thousand/µL      Eosinophils Absolute 0.10 Thousand/µL      Basophils Absolute 0.02 Thousands/µL     Blood culture #1 [[id number]] Collected: 11/15/24 0737    Lab Status: In process Specimen: Blood from Arm, Left Updated: 11/15/24 0748    Blood culture #2 [[id number]] Collected: 11/15/24 0737    Lab Status: In process Specimen: Blood from Arm, Left Updated: 11/15/24 0748    UA w Reflex to Microscopic w Reflex to Culture [[id number]]     Lab Status: No result Specimen: Urine             No orders to display       ECG 12 Lead Documentation Only    Date/Time: 11/15/2024 7:37 AM    Performed by: Cali Chaudhari MD  Authorized by: Cali Chaudhari MD    ECG reviewed by me, the ED Provider: yes    Patient location:  ED  Interpretation:     Interpretation: abnormal    Rate:     ECG rate:  103    ECG rate assessment: tachycardic    Rhythm:     Rhythm: sinus rhythm    Ectopy:     Ectopy: none    QRS:     QRS axis:  Left    QRS intervals:  Normal  Conduction:     Conduction: normal    ST segments:     ST segments:  Normal  T waves:     T waves: normal    Paracentesis    Date/Time: 11/15/2024 10:32 AM    Performed by: Cali Chaudhari MD  Authorized by: Cali Chaudhari MD    Patient location:  ED  Other Assisting Provider: No    Consent:     Consent obtained:  Verbal    Consent given by:  Patient    Risks discussed:  Infection    Alternatives discussed:  No treatment, delayed treatment, alternative treatment, observation and referral  Universal protocol:     Procedure explained and questions answered to patient or proxy's satisfaction: yes      Relevant documents present and verified: yes      Test results available and properly labeled: yes      Radiology Images displayed and confirmed.  If images not available, report reviewed: yes      Required blood products, implants, devices and special equipment available: yes      Site/side marked: yes      Immediately prior to procedure a time out was called: yes      Patient  identity confirmed:  Verbally with patient and arm band  Pre-procedure details:     Initial or Subsequent Exam:  Initial    Procedure purpose:  Diagnostic    Indications: abdominal discomfort secondary to ascites and suspected peritonitis      Preparation: Patient was prepped and draped in usual sterile fashion    Anesthesia (see MAR for exact dosages):     Anesthesia method:  Local infiltration    Local anesthetic:  Lidocaine 1% w/o epi  Procedure details:     Needle gauge:  18    Equipment: Paracentesis kit used      Ultrasound guidance: yes      Sterile ultrasound techniques: Sterile gel and sterile probe covers were used      Approach:  Percutaneous    Puncture site:  R lower quadrant    Fluid removed amount:  12cc    Fluid appearance:  Serosanguinous    Dressing:  Adhesive bandage  Post-procedure details:     Patient tolerance of procedure:  Tolerated well, no immediate complications  Post-procedure medication (see MAR for dosing):     Albumin replacement: No        ED Medication and Procedure Management   Prior to Admission Medications   Prescriptions Last Dose Informant Patient Reported? Taking?   Insulin Pen Needle (BD Pen Needle Nayla 2nd Gen) 32G X 4 MM MISC  Self No No   Sig: For use with insulin pen. Pharmacy may dispense brand covered by insurance.   MILK THISTLE PO  Self Yes No   Sig: Take 1 tablet by mouth 2 (two) times a day   Multiple Vitamin (multivitamin) capsule  Self No No   Sig: Take 1 capsule by mouth daily   bumetanide (BUMEX) 1 mg tablet  Self No No   Sig: Take 1 tablet (1 mg total) by mouth daily Do not start before February 29, 2024.   glimepiride (AMARYL) 4 mg tablet  Self Yes No   Sig: Take 4 mg by mouth 2 (two) times a day   Patient not taking: Reported on 9/23/2024   insulin glargine (LANTUS) 100 units/mL subcutaneous injection  Self Yes No   Sig: Inject 40 Units under the skin daily at bedtime   Patient not taking: Reported on 9/23/2024   insulin regular (HumuLIN R,NovoLIN R) 100  units/mL injection  Self Yes No   Sig: Inject 10 Units under the skin 3 (three) times a day with meals   Patient not taking: Reported on 9/23/2024   lactulose (CHRONULAC) 10 g/15 mL solution  Self Yes No   Sig: Take 20 g by mouth 2 (two) times a day   pantoprazole (PROTONIX) 40 mg tablet  Self No No   Sig: Take 1 tablet (40 mg total) by mouth daily   potassium chloride (Klor-Con M20) 20 mEq tablet  Self No No   Sig: Take 1 tablet (20 mEq total) by mouth daily Do not start before February 29, 2024.   Patient not taking: Reported on 9/23/2024   spironolactone (ALDACTONE) 50 mg tablet   Yes No   Sig: Take 50 mg by mouth daily   tamsulosin (FLOMAX) 0.4 mg  Self No No   Sig: Take 1 capsule (0.4 mg total) by mouth daily with dinner   traZODone (DESYREL) 50 mg tablet  Self Yes No   Sig: Take 50 mg by mouth daily at bedtime bedtime      Facility-Administered Medications: None     Patient's Medications   Discharge Prescriptions    No medications on file     No discharge procedures on file.  ED SEPSIS DOCUMENTATION   Time reflects when diagnosis was documented in both MDM as applicable and the Disposition within this note       Time User Action Codes Description Comment    11/15/2024  8:38 AM Cali Chaudhari [K65.2] SBP (spontaneous bacterial peritonitis) ([facility])     11/15/2024  8:39 AM Cali Chaudhari [A41.9,  R65.21] Septic shock ([facility])     11/15/2024  8:42 AM Cali Chaudhari [K65.2] SBP (spontaneous bacterial peritonitis) ([facility])     11/15/2024  8:42 AM Cali Chaudhari Modify [A41.9,  R65.21] Septic shock ([facility])     11/15/2024  8:42 AM Cali Chaudhari [L03.90] Cellulitis     11/15/2024  8:43 AM Cali Chaudhari [D64.9] Anemia     11/15/2024  8:43 AM Cali Chaudhari [D69.6] Thrombocytopenia ([facility])     11/15/2024  9:37 AM Cali Chaudhari [E72.20] Hyperammonemia ([facility])            Initial Sepsis Screening       Row Name 11/15/24 0840                Is the patient's history suggestive of a new or worsening  "infection? Yes (Proceed)  -MK        Suspected source of infection acute abdominal infection  -MK        Indicate SIRS criteria Tachycardia > 90 bpm;Tachypnea > 20 resp per min  -MK        Are two or more of the above signs & symptoms of infection both present and new to the patient? Yes (Proceed)  -MK        Assess for evidence of organ dysfunction: Are any of the below criteria present within 6 hours of suspected infection and SIRS criteria that are NOT considered to be chronic conditions? SBP < 90;MAP < 65;Lactate > 2.0;INR > 1.5 or aPTT > 60 secs;Platelet count < 100,000  -        Date of presentation of severe sepsis 11/15/24  -        Time of presentation of severe sepsis 0842  -        Date of presentation of septic shock 11/15/24  -        Time of presentation of septic shock 0842  -        Fluid Resuscitation: 30 ml/kg IV fluid bolus will be given based on actual body weight  -        Is the patient is persistently hypotensive in the hour after fluid bolus administration? If yes, patient meets criteria for vasopressor use. NO  -MK        Sepsis Note: Click \"NEXT\" below (NOT \"close\") to generate sepsis note based on above information. YES (proceed by clicking \"NEXT\")  -                  User Key  (r) = Recorded By, (t) = Taken By, (c) = Cosigned By      Initials Name Provider Type     Cali Chaudhari MD Resident                  Default Flowsheet Data (Last 720 Hours)       Sepsis Reassess       Row Name 11/15/24 0955                   Repeat Volume Status and Tissue Perfusion Assessment Performed    Date of Reassessment: 11/15/24  -        Time of Reassessment: 0956  -MK        Sepsis Reassessment Note: Click \"NEXT\" below (NOT \"close\") to generate sepsis reassessment note. YES (proceed by clicking \"NEXT\")  -        Repeat Volume Status and Tissue Perfusion Assessment Performed --                  User Key  (r) = Recorded By, (t) = Taken By, (c) = Cosigned By      Initials Name Provider " Type    MK Cali Chaudhari MD Resident                     Cali Chaudhari MD  11/15/24 2766

## 2024-11-15 NOTE — ED NOTES
This RN spoke with critical care, critical care to put in pain meds for patient. Patient allowed to have ice chips, Ice ships given to patient at this time.      Yue Villaseñor RN  11/15/24 3352

## 2024-11-15 NOTE — SEPSIS NOTE
"  Sepsis Note   Isael Avendano 69 y.o. male MRN: [id number]  Unit/Bed#: ED-36 Encounter: [id number]       Initial Sepsis Screening       Row Name 11/15/24 0840                Is the patient's history suggestive of a new or worsening infection? Yes (Proceed)  -MK        Suspected source of infection acute abdominal infection  -MK        Indicate SIRS criteria Tachycardia > 90 bpm;Tachypnea > 20 resp per min  -MK        Are two or more of the above signs & symptoms of infection both present and new to the patient? Yes (Proceed)  -MK        Assess for evidence of organ dysfunction: Are any of the below criteria present within 6 hours of suspected infection and SIRS criteria that are NOT considered to be chronic conditions? SBP < 90;MAP < 65;Lactate > 2.0;INR > 1.5 or aPTT > 60 secs;Platelet count < 100,000  -MK        Date of presentation of severe sepsis 11/15/24  -        Time of presentation of severe sepsis 0842  -        Date of presentation of septic shock 11/15/24  -        Time of presentation of septic shock 0842  -        Fluid Resuscitation: 30 ml/kg IV fluid bolus will be given based on actual body weight  -MK        Is the patient is persistently hypotensive in the hour after fluid bolus administration? If yes, patient meets criteria for vasopressor use. NO  -MK        Sepsis Note: Click \"NEXT\" below (NOT \"close\") to generate sepsis note based on above information. YES (proceed by clicking \"NEXT\")  -                  User Key  (r) = Recorded By, (t) = Taken By, (c) = Cosigned By      Initials Name Provider Type     Cali Chaudhari MD Resident                        Body mass index is 35.08 kg/m².  Wt Readings from Last 1 Encounters:   11/15/24 114 kg (251 lb 8.7 oz)        Ideal body weight: 75.3 kg (166 lb 0.1 oz)  Adjusted ideal body weight: 90.8 kg (200 lb 3.5 oz)    "

## 2024-11-15 NOTE — ED ATTENDING ATTESTATION
11/15/2024  IHumza DO, saw and evaluated the patient. I have discussed the patient with the resident/non-physician practitioner and agree with the resident's/non-physician practitioner's findings, Plan of Care, and MDM as documented in the resident's/non-physician practitioner's note, except where noted. All available labs and Radiology studies were reviewed.  I was present for key portions of any procedure(s) performed by the resident/non-physician practitioner and I was immediately available to provide assistance.       At this point I agree with the current assessment done in the Emergency Department.  I have conducted an independent evaluation of this patient a history and physical is as follows:              1. Septic shock (HCC)    2. SBP (spontaneous bacterial peritonitis) (HCC)    3. Cellulitis    4. Anemia    5. Thrombocytopenia (HCC)    6. Hyperammonemia (HCC)            MDM  Number of Diagnoses or Management Options  Anemia  Cellulitis  SBP (spontaneous bacterial peritonitis) (HCC)  Septic shock (HCC)  Thrombocytopenia (HCC)  Diagnosis management comments:       Initial ED assessment:   69-year-old male hypotensive, tachycardic, cellulitis of the left leg on exam, distended abdomen with known ascites status post recent paracentesis    Pathology at risk for includes but is not limited to:    Sepsis secondary to cellulitis, return of SBP since he is off antibiotics, consider injury from recent paracentesis.,  No physical exam evidence of pneumonia    Initial ED plan:   Long conversation with patient and family.,  Actually had hospice conversation with family as patient does admit that he is getting frustrated with all the treatment he wants to be DNR/DNI but still excepting full medical treatment.  Will start broad-spectrum antibiotics, IV fluids, vasopressors if necessary        Final ED summary/disposition:   After evaluation and workup in the emergency department, ultimately continued  hypotension, likely sepsis secondary to left lower extremity cellulitis, started on norepinephrine admitted to critical care               Time reflects when diagnosis was documented in both MDM as applicable and the Disposition within this note       Time User Action Codes Description Comment    11/15/2024  8:38 AM Cali Chaudhari Add [K65.2] SBP (spontaneous bacterial peritonitis) ([facility])     11/15/2024  8:39 AM KoCali abraham Add [A41.9,  R65.21] Septic shock ([facility])     11/15/2024  8:42 AM KoCali abraham Modify [K65.2] SBP (spontaneous bacterial peritonitis) ([facility])     11/15/2024  8:42 AM KoCali abraham Modify [A41.9,  R65.21] Septic shock ([facility])     11/15/2024  8:42 AM KoCali abraham Add [L03.90] Cellulitis     11/15/2024  8:43 AM KoClai abraham Add [D64.9] Anemia     11/15/2024  8:43 AM Cali Chaudhari Add [D69.6] Thrombocytopenia ([facility])     11/15/2024  9:37 AM Cali Chaudhari Add [E72.20] Hyperammonemia ([facility])           ED Disposition       None          Follow-up Information    None                       Chief Complaint   Patient presents with    Leg Swelling     Pt from home via [facility] complaints of bilateral lower extremity swelling                      69-year-old male, just discharged from department few days prior after having paracentesis for ascites, has been on  bacillin infusions via PICC line for spontaneous bacterial peritonitis, now with increasing leg pain inability to walk, and generalized weakness.  Arrives to the ER hypotensive, tachycardic and with a hot swollen left leg.  Patient has diffuse anasarca this has been worsening as well.  I personally saw patient last ED visit we did have long discussions with him about admission he did not want to be admitted last time he is agreeable to be admitted this time.      History provided by:  Patient, spouse and relative                    Physical Exam  Vitals reviewed.   Constitutional:       General: He is in acute distress.      Appearance: He is  well-developed. He is ill-appearing. He is not diaphoretic.   HENT:      Head: Normocephalic and atraumatic.      Right Ear: External ear normal.      Left Ear: External ear normal.      Nose: Nose normal.   Eyes:      General:         Right eye: No discharge.         Left eye: No discharge.      Pupils: Pupils are equal, round, and reactive to light.   Neck:      Trachea: No tracheal deviation.   Cardiovascular:      Rate and Rhythm: Normal rate and regular rhythm.      Heart sounds: Normal heart sounds. No murmur heard.  Pulmonary:      Effort: Pulmonary effort is normal. No respiratory distress.      Breath sounds: Normal breath sounds. No stridor.   Abdominal:      General: There is distension.      Palpations: Abdomen is soft.      Tenderness: There is no abdominal tenderness. There is no guarding or rebound.   Musculoskeletal:         General: Normal range of motion.      Cervical back: Normal range of motion and neck supple.   Skin:     General: Skin is warm and dry.      Coloration: Skin is not pale.      Findings: Erythema and rash present.      Comments: Left leg more swollen then right, hot erythematous, consistent with cellulitis.     Neurological:      General: No focal deficit present.      Mental Status: He is alert and oriented to person, place, and time.               ED Course as of 11/15/24 0939   Fri Nov 15, 2024   0827 Patient meets severe sepsis criteria, will give IV fluids but not 30 cc/kg, I do feel this to be detrimental to him he does have full body fluid overload, diffuse anasarca, large volume ascites   0937 Patient came hypotensive again, infuse 30 cc/kg by ideal body weight     Still hypotensive, started nor epi          Medications   vancomycin (VANCOCIN) 2,000 mg in sodium chloride 0.9 % 500 mL IVPB (2,000 mg Intravenous New Bag 11/15/24 0905)   sodium chloride 0.9 % bolus 250 mL (250 mL Intravenous New Bag 11/15/24 0916)   sodium chloride 0.9 % bolus 1,000 mL (1,000 mL Intravenous  New Bag 11/15/24 0916)   fentaNYL injection 50 mcg (has no administration in time range)   NOREPINEPHRINE 4 MG  ML NSS (CMPD ORDER) infusion (has no administration in time range)   albumin human (FLEXBUMIN) 5 % injection 25 g (0 g Intravenous Stopped 11/15/24 0843)   piperacillin-tazobactam (ZOSYN) IVPB 4.5 g (0 g Intravenous Stopped 11/15/24 0807)   fentaNYL injection 50 mcg (50 mcg Intravenous Given 11/15/24 0813)   multi-electrolyte (ISOLYTE-S PH 7.4) bolus 1,000 mL (0 mL Intravenous Stopped 11/15/24 0932)             Labs Reviewed   CBC AND DIFFERENTIAL - Abnormal       Result Value Ref Range Status    WBC 10.04  4.31 - 10.16 Thousand/uL Final    RBC 3.45 (*) 3.88 - 5.62 Million/uL Final    Hemoglobin 8.9 (*) 12.0 - 17.0 g/dL Final    Hematocrit 28.5 (*) 36.5 - 49.3 % Final    MCV 83  82 - 98 fL Final    MCH 25.8 (*) 26.8 - 34.3 pg Final    MCHC 31.2 (*) 31.4 - 37.4 g/dL Final    RDW 22.2 (*) 11.6 - 15.1 % Final    Platelets 70 (*) 149 - 390 Thousands/uL Final    Comment: Results verified by repeat    nRBC 0  /100 WBCs Final    Segmented % 89 (*) 43 - 75 % Final    Immature Grans % 0  0 - 2 % Final    Lymphocytes % 6 (*) 14 - 44 % Final    Monocytes % 4  4 - 12 % Final    Eosinophils Relative 1  0 - 6 % Final    Basophils Relative 0  0 - 1 % Final    Absolute Neutrophils 8.91 (*) 1.85 - 7.62 Thousands/µL Final    Absolute Immature Grans 0.03  0.00 - 0.20 Thousand/uL Final    Absolute Lymphocytes 0.58 (*) 0.60 - 4.47 Thousands/µL Final    Absolute Monocytes 0.40  0.17 - 1.22 Thousand/µL Final    Eosinophils Absolute 0.10  0.00 - 0.61 Thousand/µL Final    Basophils Absolute 0.02  0.00 - 0.10 Thousands/µL Final   AMMONIA - Abnormal    Ammonia 74 (*) 18 - 72 umol/L Final    Comment: Moderately Hemolyzed:Results may be affected.   LACTIC ACID, PLASMA (W/REFLEX IF RESULT > 2.0) - Abnormal    LACTIC ACID 3.2 (*) 0.5 - 2.0 mmol/L Final    Narrative:     Result may be elevated if tourniquet was used during  collection.   PROTIME-INR - Abnormal    Protime 23.5 (*) 12.3 - 15.0 seconds Final    INR 2.01 (*) 0.85 - 1.19 Final    Narrative:     INR Therapeutic Range    Indication                                             INR Range      Atrial Fibrillation                                               2.0-3.0  Hypercoagulable State                                    2.0.2.3  Left Ventricular Asist Device                            2.0-3.0  Mechanical Heart Valve                                  -    Aortic(with afib, MI, embolism, HF, LA enlargement,    and/or coagulopathy)                                     2.0-3.0 (2.5-3.5)     Mitral                                                             2.5-3.5  Prosthetic/Bioprosthetic Heart Valve               2.0-3.0  Venous thromboembolism (VTE: VT, PE        2.0-3.0   APTT - Abnormal    PTT 42 (*) 23 - 34 seconds Final    Comment: Therapeutic Heparin Range =  60-90 seconds   PROCALCITONIN TEST - Abnormal    Procalcitonin 3.19 (*) <=0.25 ng/ml Final    Comment: Suspected Lower Respiratory Tract Infection (LRTI):  - LESS than or EQUAL to 0.25 ng/mL:   low likelihood for bacterial LRTI; antibiotics DISCOURAGED.  - GREATER than 0.25 ng/mL:   increased likelihood for bacterial LRTI; antibiotics ENCOURAGED.    Suspected Sepsis:  - Strongly consider initiating antibiotics in ALL UNSTABLE patients.  - LESS than or EQUAL to 0.5 ng/mL:   low likelihood for bacterial sepsis; antibiotics DISCOURAGED.  - GREATER than 0.5 ng/mL:   increased likelihood for bacterial sepsis; antibiotics ENCOURAGED.  - GREATER than 2 ng/mL:   high risk for severe sepsis / septic shock; antibiotics strongly ENCOURAGED.    Decisions on antibiotic use should not be based solely on Procalcitonin (PCT) levels. If PCT is low but uncertainty exists with stopping antibiotics, repeat PCT in 6-24 hours to confirm the low level. If antibiotics are administered (regardless if initial PCT was high or low), repeat PCT  every 1-2 days to consider early antibiotic cessation (when GREATER than 80% decrease from the peak OR when PCT drops below designated cutoffs, whichever comes first), so long as the infection is NOT one that typically requires prolonged treatment durations (e.g., bone/joint infections, endocarditis, Staph. aureus bacteremia).    Situations of FALSE-POSITIVE Procalcitonin values:  1) Newborns < 72 hours old  2) Massive stress from severe trauma / burns, major surgery, acute pancreatitis, cardiogenic / hemorrhagic shock, sickle cell crisis, or other organ perfusion abnormalities  3) Malaria and some Candidal infections  4) Treatment with agents that stimulate cytokines (e.g., OKT3, anti-lymphocyte globulins, alemtuzumab, IL-2, granulocyte transfusion [NOT GCSFs])  5) Chronic renal disease causes elevated baseline levels (consider GREATER than 0.75 ng/mL as an abnormal cut-off); initiating HD/CRRT may cause transient decreases  6) Paraneoplastic syndromes from medullary thyroid or SCLC, some forms of vasculitis, and acute [id number] disease    Situations of FALSE-NEGATIVE Procalcitonin values:  1) Too early in clinical course for PCT to have reached its peak (may repeat in 6-24 hours to confirm low level)  2) Localized infection WITHOUT systemic (SIRS / sepsis) response (e.g., an abscess, osteomyelitis, cystitis)  3) Mycobacteria (e.g., Tuberculosis, MAC)  4) Cystic fibrosis exacerbations     BLOOD CULTURE   BLOOD CULTURE   BODY FLUID CULTURE AND GRAM STAIN   COMPREHENSIVE METABOLIC PANEL   LIPASE   MAGNESIUM   UA W REFLEX TO MICROSCOPIC WITH REFLEX TO CULTURE   LACTIC ACID 2 HOUR   BODY FLUID WHITE CELL COUNT WITH DIFF         No orders to display                  CriticalCare Time    Date/Time: 11/15/2024 9:39 AM    Performed by: Humza Suárez DO  Authorized by: Humza Suárez DO    Critical care provider statement:     Critical care time (minutes):  80    Critical care time was exclusive of:  Separately  billable procedures and treating other patients and teaching time    Critical care was necessary to treat or prevent imminent or life-threatening deterioration of the following conditions:  Shock and sepsis    Critical care was time spent personally by me on the following activities:  Obtaining history from patient or surrogate, development of treatment plan with patient or surrogate, discussions with consultants, evaluation of patient's response to treatment, examination of patient, ordering and performing treatments and interventions, ordering and review of laboratory studies, ordering and review of radiographic studies, re-evaluation of patient's condition and review of old charts

## 2024-11-15 NOTE — PROGRESS NOTES
Isael Avendano is a 69 y.o. male who is currently ordered Vancomycin IV with management by the Pharmacy Consult service.  Relevant clinical data and objective / subjective history reviewed.  Vancomycin Assessment:  Indication and Goal AUC/Trough: Soft tissue (goal -600, trough >10)  Clinical Status:  New  Micro:     Renal Function:  SCr: 1.67 mg/dL  CrCl: 47.1 mL/min  Renal replacement: Not on dialysis  Days of Therapy: 1  Current Dose: 2g IV x 1 dose load  Vancomycin Plan:  New Dosinmg IV Q24  Estimated AUC: 474 mcg*hr/mL  Estimated Trough: 14 mcg/mL  Next Level:  @ 0600  Renal Function Monitoring: Daily BMP and UOP  Pharmacy will continue to follow closely for s/sx of nephrotoxicity, infusion reactions and appropriateness of therapy.  BMP and CBC will be ordered per protocol. We will continue to follow the patient’s culture results and clinical progress daily.    Leona Guzman, Pharmacist

## 2024-11-15 NOTE — SEPSIS NOTE
"  Sepsis Note   Isael Avendano 69 y.o. male MRN: [id number]  Unit/Bed#: ED-36 Encounter: [id number]       Initial Sepsis Screening       Row Name 11/15/24 0840                Is the patient's history suggestive of a new or worsening infection? Yes (Proceed)  -MK        Suspected source of infection acute abdominal infection  -MK        Indicate SIRS criteria Tachycardia > 90 bpm;Tachypnea > 20 resp per min  -MK        Are two or more of the above signs & symptoms of infection both present and new to the patient? Yes (Proceed)  -MK        Assess for evidence of organ dysfunction: Are any of the below criteria present within 6 hours of suspected infection and SIRS criteria that are NOT considered to be chronic conditions? SBP < 90;MAP < 65;Lactate > 2.0;INR > 1.5 or aPTT > 60 secs;Platelet count < 100,000  -        Date of presentation of severe sepsis 11/15/24  -        Time of presentation of severe sepsis 0842  -        Date of presentation of septic shock 11/15/24  -        Time of presentation of septic shock 0842  -        Fluid Resuscitation: 30 ml/kg IV fluid bolus will be given based on actual body weight  -        Is the patient is persistently hypotensive in the hour after fluid bolus administration? If yes, patient meets criteria for vasopressor use. NO  -MK        Sepsis Note: Click \"NEXT\" below (NOT \"close\") to generate sepsis note based on above information. YES (proceed by clicking \"NEXT\")  -                  User Key  (r) = Recorded By, (t) = Taken By, (c) = Cosigned By      Initials Name Provider Type     Cali Chaudhari MD Resident                    Default Flowsheet Data (Last 720 Hours)       Sepsis Reassess       Row Name 11/15/24 0990                   Repeat Volume Status and Tissue Perfusion Assessment Performed    Date of Reassessment: 11/15/24  -        Time of Reassessment: 0956  -        Sepsis Reassessment Note: Click \"NEXT\" below (NOT \"close\") to generate sepsis " "reassessment note. YES (proceed by clicking \"NEXT\")  -MARGARITO        Repeat Volume Status and Tissue Perfusion Assessment Performed --                  User Key  (r) = Recorded By, (t) = Taken By, (c) = Cosigned By      Initials Name Provider Type    MARGARITO Chaudhari MD Resident                    Body mass index is 35.08 kg/m².  Wt Readings from Last 1 Encounters:   11/15/24 114 kg (251 lb 8.7 oz)     IBW (Ideal Body Weight): 75.3 kg    Ideal body weight: 75.3 kg (166 lb 0.1 oz)  Adjusted ideal body weight: 90.8 kg (200 lb 3.5 oz)    "

## 2024-11-16 PROBLEM — L03.116 CELLULITIS OF LEFT LOWER EXTREMITY: Status: ACTIVE | Noted: 2024-11-16

## 2024-11-16 PROBLEM — K65.2 SBP (SPONTANEOUS BACTERIAL PERITONITIS) (HCC): Status: ACTIVE | Noted: 2024-11-16

## 2024-11-16 NOTE — ACP (ADVANCE CARE PLANNING)
On chart review, noted that patient mentioned he did not want CPR or intubation in ED, and so code status was discussed with him again. At this time, he reiterated that he did not want CPR or intubation, and would like to be a level 3.     Patient is also requiring 10 mcg/min to maintain a MAP of 65, with multiple doses of albumi given. Discussed the potential need for a central line, and he deferred the line at this time, requesting to wait till his wife arrives in the morning to discuss it.

## 2024-11-16 NOTE — ASSESSMENT & PLAN NOTE
Hx:HCC, prior history of heavy etoh use; Exam: abdominal distention  MELD 25    Assessment: Prior history of SBP, not on prophylaxis    Plan:  GI consult  Daily MELD labs  Continue PTA lactulose 20 g bid, adjust as needed for 3-4 loose BM per day  Consult IR for paracentesis vs bedside procedure

## 2024-11-16 NOTE — ASSESSMENT & PLAN NOTE
Hx:significant for decompensated cirrhosis, ?SBP, cellulitis; Exam: Left leg swelling  Labs: lactic acid 3.0, wbc 10.04 (>50% from base line), body fluid neutrophil 238.6 , BUN 39, CR 1. 67 Imaging: Cxr - increased interstitial markings    Assessment: Suspect hypovolemic shock 2/2 sepsis and decompensated cirrhosis causing third-spacing and intravascular volume depletion despite total body volume up.   Treated with ampicillin until night prior to presentation    Plan:  Cefepime 2 g q8 for suspected nosocomial SBP, LLE cellulitis  Consider discontinuing vanc  Cultures pending, f/u  Wean pressors as able, currently on Levo 12 mcg/min via PICC

## 2024-11-16 NOTE — ASSESSMENT & PLAN NOTE
Suspect prerenal secondary to shock state, possible component of hepatorenal  Cr down-trending today from 1.67 to 1.52 (baseline 0.7-1.0)    Bumex 1 mg IV BID  Albumin 25% 12.5 g Q6H  Hold home spironolactone w K 4.5  Monitor UOP, lytes, renal indices and adjust regimen accordingly

## 2024-11-16 NOTE — ASSESSMENT & PLAN NOTE
Lab Results   Component Value Date    HGBA1C 6.7 (H) 10/23/2024       Recent Labs     11/15/24  1801 11/15/24  2141 11/16/24  0557 11/16/24  0756   POCGLU 187* 214* 184* 172*       Blood Sugar Average: Last 72 hrs:  (P) 187.8    Assessment:   BG currently at goal    Plan:  Sliding scale per accu check  Monitor blood glucose and insulin requirements and adjust regimen as needed

## 2024-11-16 NOTE — CONSULTS
BEDSIDE PROCEDURE  Indwelling Catheter PROCEDURE NOTE    Pre-operative Diagnosis: Exacerbation CHF, anasarca, penoscrotal edema, phimosis and difficult catheterization        Post-operative Diagnosis: Same    INDICATION   Isael Avendano is a 69-year-old male admitted to ICU for exacerbation of CHF with positive anasarca and resultant severe penoscrotal edema.  He requires aggressive diuretics.  The critical care team have contacted our service in  Consultation requested to perform Tejeda Catheter Placement.     TIME OUT: Correct patient identity.    PROCEDURE   Consent: Patient was agreeable. Formal  Informed consent however, not applicable.    Under sterile conditions the patient was positioned. Betadine solution and sterile drapes were utilized.  Non- Petroleum based gel was used to lubricate urethral meatus insertion site.  Utilized 14 Vincentian catheter. Urine was obtained without any difficulties and  Without evidence of hematuria or trauma.   Findings  200 ml of clear yellow urine was obtained.     Complications:  None; patient tolerated the procedure well.            Condition: stable    Plan  Maintain Tejeda to straight drainage.   Do not remove.   Remove catheter at rehab facility once edema has improved.  AVS updated with catheter care instructions for skilled nursing.  No further  intervention indicated.        AMOR Richter        Attending Attestation: Not Applicable

## 2024-11-16 NOTE — DISCHARGE INSTR - AVS FIRST PAGE
Cadena Cath Care instructions---Maintain cadena catheter to straight drainage. May use leg bag and shower. May flush TID prn using Karlie syringe and 120 ml NSS. May use more saline ad emerita to prevent/treat cath obstruction. Remove catheter on 8th day rehab by 0600 hrs. Bladder scan if no void or less than 200ml in 6hrs. Straight cath for bladder scan >350ml and repeat process. If patient requires straight cath x3 , re-insert cadena and call for Urologist follow-up. Information above. Cadena can remain in place for up to 4 weeks at a time. Cadena placed at [facility] on 11/16/24.

## 2024-11-16 NOTE — PROGRESS NOTES
Isael Avendano is a 69 y.o. male who is currently ordered Vancomycin IV with management by the Pharmacy Consult service.  Relevant clinical data and objective / subjective history reviewed.  Vancomycin Assessment:  Indication and Goal AUC/Trough: Soft tissue (goal -600, trough >10)  Clinical Status:  critically ill  Micro:     Renal Function:  SCr: 1.52 mg/dL  CrCl: 57.9 mL/min  Renal replacement:  ABILIO  Days of Therapy: 2  Current Dose: 1250 mg IV q24h  Vancomycin Plan:  New Dosing: pulse dosing of 1250 mg IV as needed for random level <15  Estimated AUC: N/A   Estimated Trough: N/A  Next Level: 11/17 at 0600  Renal Function Monitoring: Daily BMP and UOP  Pharmacy will continue to follow closely for s/sx of nephrotoxicity, infusion reactions and appropriateness of therapy.  BMP and CBC will be ordered per protocol. We will continue to follow the patient’s culture results and clinical progress daily.    Kathia Grigsby, Pharmacist

## 2024-11-16 NOTE — ASSESSMENT & PLAN NOTE
Na: 130, 131 corrected on admission  Na improved to 134 corrected today    Suspect hypovolemic hyponatremia secondary to decompensated cirrhosis with ascites and volume overload    Monitor serum sodium on am BMP  Rest of plan as above   No

## 2024-11-16 NOTE — PLAN OF CARE
Problem: Potential for Falls  Goal: Patient will remain free of falls  Description: INTERVENTIONS:  - Educate patient/family on patient safety including physical limitations  - Instruct patient to call for assistance with activity   - Consult OT/PT to assist with strengthening/mobility   - Keep Call bell within reach  - Keep bed low and locked with side rails adjusted as appropriate  - Keep care items and personal belongings within reach  - Initiate and maintain comfort rounds  - Make Fall Risk Sign visible to staff  - Offer Toileting every  Hours, in advance of need  - Initiate/Maintain alarm  - Obtain necessary fall risk management equipment:   - Apply yellow socks and bracelet for high fall risk patients  - Consider moving patient to room near nurses station  Outcome: Progressing     Problem: Prexisting or High Potential for Compromised Skin Integrity  Goal: Skin integrity is maintained or improved  Description: INTERVENTIONS:  - Identify patients at risk for skin breakdown  - Assess and monitor skin integrity  - Assess and monitor nutrition and hydration status  - Monitor labs   - Assess for incontinence   - Turn and reposition patient  - Assist with mobility/ambulation  - Relieve pressure over bony prominences  - Avoid friction and shearing  - Provide appropriate hygiene as needed including keeping skin clean and dry  - Evaluate need for skin moisturizer/barrier cream  - Collaborate with interdisciplinary team   - Patient/family teaching  - Consider wound care consult   Outcome: Progressing     Problem: PAIN - ADULT  Goal: Verbalizes/displays adequate comfort level or baseline comfort level  Description: Interventions:  - Encourage patient to monitor pain and request assistance  - Assess pain using appropriate pain scale  - Administer analgesics based on type and severity of pain and evaluate response  - Implement non-pharmacological measures as appropriate and evaluate response  - Consider cultural and  social influences on pain and pain management  - Notify physician/advanced practitioner if interventions unsuccessful or patient reports new pain  Outcome: Progressing     Problem: INFECTION - ADULT  Goal: Absence or prevention of progression during hospitalization  Description: INTERVENTIONS:  - Assess and monitor for signs and symptoms of infection  - Monitor lab/diagnostic results  - Monitor all insertion sites, i.e. indwelling lines, tubes, and drains  - Monitor endotracheal if appropriate and nasal secretions for changes in amount and color  - [place] appropriate cooling/warming therapies per order  - Administer medications as ordered  - Instruct and encourage patient and family to use good hand hygiene technique  - Identify and instruct in appropriate isolation precautions for identified infection/condition  Outcome: Progressing  Goal: Absence of fever/infection during neutropenic period  Description: INTERVENTIONS:  - Monitor WBC    Outcome: Progressing     Problem: SAFETY ADULT  Goal: Patient will remain free of falls  Description: INTERVENTIONS:  - Educate patient/family on patient safety including physical limitations  - Instruct patient to call for assistance with activity   - Consult OT/PT to assist with strengthening/mobility   - Keep Call bell within reach  - Keep bed low and locked with side rails adjusted as appropriate  - Keep care items and personal belongings within reach  - Initiate and maintain comfort rounds  - Make Fall Risk Sign visible to staff  - Offer Toileting every  Hours, in advance of need  - Initiate/Maintain alarm  - Obtain necessary fall risk management equipment:   - Apply yellow socks and bracelet for high fall risk patients  - Consider moving patient to room near nurses station  Outcome: Progressing  Goal: Maintain or return to baseline ADL function  Description: INTERVENTIONS:  -  Assess patient's ability to carry out ADLs; assess patient's baseline for ADL function and  identify physical deficits which impact ability to perform ADLs (bathing, care of mouth/teeth, toileting, grooming, dressing, etc.)  - Assess/evaluate cause of self-care deficits   - Assess range of motion  - Assess patient's mobility; develop plan if impaired  - Assess patient's need for assistive devices and provide as appropriate  - Encourage maximum independence but intervene and supervise when necessary  - Involve family in performance of ADLs  - Assess for home care needs following discharge   - Consider OT consult to assist with ADL evaluation and planning for discharge  - Provide patient education as appropriate  Outcome: Progressing  Goal: Maintains/Returns to pre admission functional level  Description: INTERVENTIONS:  - Perform AM-PAC 6 Click Basic Mobility/ Daily Activity assessment daily.  - Set and communicate daily mobility goal to care team and patient/family/caregiver.   - Collaborate with rehabilitation services on mobility goals if consulted  - Perform Range of Motion  times a day.  - Reposition patient every  hours.  - Dangle patient  times a day  - Stand patient  times a day  - Ambulate patient  times a day  - Out of bed to chair  times a day   - Out of bed for meals times a day  - Out of bed for toileting  - Record patient progress and toleration of activity level   Outcome: Progressing     Problem: DISCHARGE PLANNING  Goal: Discharge to home or other facility with appropriate resources  Description: INTERVENTIONS:  - Identify barriers to discharge w/patient and caregiver  - Arrange for needed discharge resources and transportation as appropriate  - Identify discharge learning needs (meds, wound care, etc.)  - Arrange for interpretive services to assist at discharge as needed  - Refer to Case Management Department for coordinating discharge planning if the patient needs post-hospital services based on physician/advanced practitioner order or complex needs related to functional status, cognitive  ability, or social support system  Outcome: Progressing     Problem: Knowledge Deficit  Goal: Patient/family/caregiver demonstrates understanding of disease process, treatment plan, medications, and discharge instructions  Description: Complete learning assessment and assess knowledge base.  Interventions:  - Provide teaching at level of understanding  - Provide teaching via preferred learning methods  Outcome: Progressing

## 2024-11-16 NOTE — PROGRESS NOTES
Progress Note - Critical Care/ICU   Name: Isael Avendano 69 y.o. male I MRN: [id number]  Unit/Bed#: ICU 01 I Date of Admission: 11/15/2024   Date of Service: 11/16/2024 I Hospital Day: 1      Assessment & Plan  Shock (HCC)  Hx:significant for decompensated cirrhosis, ?SBP, cellulitis; Exam: Left leg swelling  Labs: lactic acid 3.0, wbc 10.04 (>50% from base line), body fluid neutrophil 238.6 , BUN 39, CR 1. 67 Imaging: Cxr - increased interstitial markings    Assessment: Suspect hypovolemic shock 2/2 sepsis and decompensated cirrhosis causing third-spacing and intravascular volume depletion despite total body volume up.   Treated with ampicillin until night prior to presentation    Plan:  Cefepime 2 g q8 for suspected nosocomial SBP, LLE cellulitis  Consider discontinuing vanc  Cultures pending, f/u  Wean pressors as able, currently on Levo 12 mcg/min via PICC    Decompensated cirrhosis (HCC)  Hx:HCC, prior history of heavy etoh use; Exam: abdominal distention  MELD 25    Assessment: Prior history of SBP, not on prophylaxis    Plan:  GI consult  Daily MELD labs  Continue PTA lactulose 20 g bid, adjust as needed for 3-4 loose BM per day  Consult IR for paracentesis vs bedside procedure  Diabetes (HCC)  Lab Results   Component Value Date    HGBA1C 6.7 (H) 10/23/2024       Recent Labs     11/15/24  1801 11/15/24  2141 11/16/24  0557 11/16/24  0756   POCGLU 187* 214* 184* 172*       Blood Sugar Average: Last 72 hrs:  (P) 187.8    Assessment:   BG currently at goal    Plan:  Sliding scale per accu check  Monitor blood glucose and insulin requirements and adjust regimen as needed    Hyponatremia  Na: 130, 131 corrected on admission  Na improved to 134 corrected today    Suspect hypovolemic hyponatremia secondary to decompensated cirrhosis with ascites and volume overload    Monitor serum sodium on am BMP  Rest of plan as above  ABILIO (acute kidney injury) (HCC)  Suspect prerenal secondary to shock state, possible component of  hepatorenal  Cr down-trending today from 1.67 to 1.52 (baseline 0.7-1.0)    Bumex 1 mg IV BID  Albumin 25% 12.5 g Q6H  Hold home spironolactone w K 4.5  Monitor UOP, lytes, renal indices and adjust regimen accordingly  Pancytopenia (HCC)  Chronic, stable  Worked up previously, secondary to hypersplenism.  SBP (spontaneous bacterial peritonitis) (HCC)  As noted under shock  Cellulitis of left lower extremity  As noted under shock  Disposition: Critical care    ICU Core Measures     A: Assess, Prevent, and Manage Pain Has pain been assessed? Yes  Need for changes to pain regimen? No   B: Both SAT/SAT  N/A   C: Choice of Sedation RASS Goal: 0 Alert and Calm  Need for changes to sedation or analgesia regimen? No   D: Delirium CAM-ICU: Negative   E: Early Mobility  Plan for early mobility? Yes   F: Family Engagement Plan for family engagement today? Yes       Antibiotic Review: Awaiting culture results.       Prophylaxis:  VTE VTE covered by:  heparin (porcine), Subcutaneous, 5,000 Units at 11/16/24 0541       Stress Ulcer  not orderedcovered bypantoprazole (PROTONIX) 40 mg tablet [[id number]] (Long-Term Med)         24 Hour Events : Admitted to ICU yesterday for shock state with sepsis and decompensated cirrhosis. Increasing pressor requirement overnight. Brief run of NSVT captured on cardiac monitor.     Subjective   This morning, Mr. Avendano is seen asleep in bed, lethargic but rouseable with persistent stimulation. Once awake, pt is oriented x3 and responding appropriately to all questions, appropriately engaged with exam. He denies SOB or difficulty breathing. C/o pain in the BLE, roughly equivalent/symmetric, but LLE more tender to palpation. States that pain is slightly improved since presentation. No other complaints at this time, and denies new or worsening sxs.   Review of Systems: See HPI for Review of Systems    Objective :                   Vitals I/O      Most Recent Min/Max in 24hrs   Temp 97.5 °F (36.4 °C)  Temp  Min: 97.5 °F (36.4 °C)  Max: 99.5 °F (37.5 °C)   Pulse 96 Pulse  Min: 92  Max: 114   Resp 12 Resp  Min: 12  Max: 34   /62 BP  Min: 81/50  Max: 153/74   O2 Sat 99 % SpO2  Min: 96 %  Max: 100 %      Intake/Output Summary (Last 24 hours) at 11/16/2024 1003  Last data filed at 11/16/2024 0400  Gross per 24 hour   Intake 3134.64 ml   Output 925 ml   Net 2209.64 ml       Diet Alan/CHO Controlled; Consistent Carbohydrate Diet Level 2 (5 carb servings/75 grams CHO/meal); Fluid Restriction 1800 ML, Sodium 2 GM    Invasive Monitoring           Physical Exam   Physical Exam  Vitals and nursing note reviewed.   Skin:     General: Skin is warm.      Coloration: Skin is not jaundiced or pale.      Comments: Erythematous L thigh. Skin tears.   HENT:      Head: Normocephalic and atraumatic.   Cardiovascular:      Rate and Rhythm: Regular rhythm. Tachycardia present.      Heart sounds: Murmur (II/VI systolic) heard.   Musculoskeletal:      Right lower leg: 3+ Edema present.      Left lower leg: 3+ Edema present.   Abdominal: General: Bowel sounds are normal. There is distension.     Tenderness: There is abdominal tenderness (LLQ). There is no guarding.      Comments: Resonant to percussion   Constitutional:       General: He is not in acute distress.     Appearance: He is well-developed. He is ill-appearing. He is not toxic-appearing.      Interventions: He is not sedated, not intubated and not restrained.  Pulmonary:      Effort: Tachypnea present. No accessory muscle usage, respiratory distress or accessory muscle usage. He is not intubated.      Breath sounds: No stridor. Wheezing (expiratory) and rales (inspiratory) present. No rhonchi.   Neurological:      Mental Status: He is alert and oriented to person, place and time.   Genitourinary/Anorectal:     Comments: Edematous genitalia         Diagnostic Studies        Lab Results: I have reviewed the following results:     Medications:  Scheduled PRN   Albumin 25%, 12.5  g, Q6H  bumetanide, 1 mg, BID  cefepime, 1,000 mg, Q12H  chlorhexidine, 15 mL, Q12H LEEANNE  heparin (porcine), 5,000 Units, Q8H LEEANNE  insulin lispro, 1-5 Units, HS  insulin lispro, 1-6 Units, TID AC  lactulose, 20 g, TID      oxyCODONE, 5 mg, Q6H PRN  [START ON 11/17/2024] vancomycin, 12.5 mg/kg (Adjusted), Daily PRN       Continuous    norepinephrine, 1-30 mcg/min, Last Rate: 12 mcg/min (11/16/24 0984)         Labs:   CBC    Recent Labs     11/15/24  0737 11/16/24  0428   WBC 10.04 10.33*   HGB 8.9* 7.9*   HCT 28.5* 25.6*   PLT 70* 81*     BMP    Recent Labs     11/15/24  1216 11/16/24  0542   SODIUM 130* 132*   K 4.6 4.5    106   CO2 21 20*   AGAP 8 6   BUN 39* 38*   CREATININE 1.67* 1.52*   CALCIUM 8.2* 8.0*       Coags    Recent Labs     11/15/24  0737 11/16/24  0329   INR 2.01* 2.01*   PTT 42* 41*        Additional Electrolytes  Recent Labs     11/16/24  0542   MG 1.8*   PHOS 3.8          Blood Gas    No recent results  Recent Labs     11/16/24  0759   PHVEN 7.388   CJY1PYF 35.2*   PO2VEN 40.9   QAZ0LMJ 20.7*   BEVEN -3.8   Z1TLOMR 71.2    LFTs  Recent Labs     11/15/24  1216 11/16/24  0542   ALT 34 26   AST 30 28   ALKPHOS 259* 214*   ALB 2.3* 2.4*   TBILI 2.51* 3.02*       Infectious  Recent Labs     11/15/24  0737 11/16/24  0428   PROCALCITONI 3.19* 2.01*     Glucose  Recent Labs     11/15/24  1216 11/16/24  0542   GLUC 171* 198*         - - -

## 2024-11-17 PROBLEM — E44.0 MODERATE PROTEIN-CALORIE MALNUTRITION (HCC): Status: ACTIVE | Noted: 2024-11-17

## 2024-11-17 NOTE — ASSESSMENT & PLAN NOTE
Hx:significant for decompensated cirrhosis, ?SBP, cellulitis; Exam: Left leg swelling  Labs: lactic acid 3.0, wbc 10.04 (>50% from base line), body fluid neutrophil 238.6 , BUN 39, CR 1. 67 Imaging: Cxr - increased interstitial markings  Ucx - gram negative rods, Bcx no growth at 24, peritoneal fluid culture - no growth at 23     Assessment: Suspect hypovolemic shock 2/2 sepsis and decompensated cirrhosis causing third-spacing and intravascular volume depletion despite total body volume up.   Treated with ampicillin until night prior to presentation      Plan:  Cefepime 2 g q8 for suspected nosocomial SBP, LLE cellulitis, and UTI  Consider continue vancomycin till bcx 24hr  Cultures pending, f/u  Wean pressors as able, currently on Levo 12 mcg/min via PICC

## 2024-11-17 NOTE — ASSESSMENT & PLAN NOTE
Suspect prerenal secondary to shock state, possible component of hepatorenal  Cr down-trending today from 1.67 to 1.52 (baseline 0.7-1.0)    Bumex 2 mg am, 1 mg pm  Albumin 25% 12.5 g Q6H  Hold home spironolactone w K 4.5  Monitor UOP, lytes, renal indices and adjust regimen accordingly

## 2024-11-17 NOTE — PROGRESS NOTES
Isael Avendano is a 69 y.o. male who is currently ordered Vancomycin IV with management by the Pharmacy Consult service.  Relevant clinical data and objective / subjective history reviewed.  Vancomycin Assessment:  Indication and Goal AUC/Trough: Soft tissue (goal -600, trough >10)  Clinical Status:  critically ill  Micro:     Renal Function:  SCr: 1.29 mg/dL  CrCl: 68.5 mL/min  Renal replacement: Not on dialysis  Days of Therapy: 3  Current Dose: pulse dosing of 1250 mg as needed for random level <15  Vancomycin Plan:  New Dosing: continue pulse dosing , random level this morning is 14.5 so a dose will be administered , will consider changing to scheduled dosing if kidney functions keeps improving  Estimated AUC: N/A  Estimated Trough: N/A  Next Level: 11/18 at 0600  Renal Function Monitoring: Daily BMP and UOP  Pharmacy will continue to follow closely for s/sx of nephrotoxicity, infusion reactions and appropriateness of therapy.  BMP and CBC will be ordered per protocol. We will continue to follow the patient’s culture results and clinical progress daily.    Kathia Grigsby, Pharmacist

## 2024-11-17 NOTE — PLAN OF CARE
Problem: Potential for Falls  Goal: Patient will remain free of falls  Description: INTERVENTIONS:  - Educate patient/family on patient safety including physical limitations  - Instruct patient to call for assistance with activity   - Consult OT/PT to assist with strengthening/mobility   - Keep Call bell within reach  - Keep bed low and locked with side rails adjusted as appropriate  - Keep care items and personal belongings within reach  - Initiate and maintain comfort rounds  - Make Fall Risk Sign visible to staff  - Offer Toileting   - Obtain necessary fall risk management equipment  - Apply yellow socks and bracelet for high fall risk patients  - Consider moving patient to room near nurses station  Outcome: Progressing     Problem: Prexisting or High Potential for Compromised Skin Integrity  Goal: Skin integrity is maintained or improved  Description: INTERVENTIONS:  - Identify patients at risk for skin breakdown  - Assess and monitor skin integrity  - Assess and monitor nutrition and hydration status  - Monitor labs   - Assess for incontinence   - Turn and reposition patient  - Assist with mobility/ambulation  - Relieve pressure over bony prominences  - Avoid friction and shearing  - Provide appropriate hygiene as needed including keeping skin clean and dry  - Evaluate need for skin moisturizer/barrier cream  - Collaborate with interdisciplinary team   - Patient/family teaching  - Consider wound care consult   Outcome: Progressing     Problem: PAIN - ADULT  Goal: Verbalizes/displays adequate comfort level or baseline comfort level  Description: Interventions:  - Encourage patient to monitor pain and request assistance  - Assess pain using appropriate pain scale  - Administer analgesics based on type and severity of pain and evaluate response  - Implement non-pharmacological measures as appropriate and evaluate response  - Consider cultural and social influences on pain and pain management  - Notify  physician/advanced practitioner if interventions unsuccessful or patient reports new pain  Outcome: Progressing     Problem: INFECTION - ADULT  Goal: Absence or prevention of progression during hospitalization  Description: INTERVENTIONS:  - Assess and monitor for signs and symptoms of infection  - Monitor lab/diagnostic results  - Monitor all insertion sites, i.e. indwelling lines, tubes, and drains  - Monitor endotracheal if appropriate and nasal secretions for changes in amount and color  - [place] appropriate cooling/warming therapies per order  - Administer medications as ordered  - Instruct and encourage patient and family to use good hand hygiene technique  - Identify and instruct in appropriate isolation precautions for identified infection/condition  Outcome: Progressing  Goal: Absence of fever/infection during neutropenic period  Description: INTERVENTIONS:  - Monitor WBC    Outcome: Progressing     Problem: SAFETY ADULT  Goal: Patient will remain free of falls  Description: INTERVENTIONS:  - Educate patient/family on patient safety including physical limitations  - Instruct patient to call for assistance with activity   - Consult OT/PT to assist with strengthening/mobility   - Keep Call bell within reach  - Keep bed low and locked with side rails adjusted as appropriate  - Keep care items and personal belongings within reach  - Initiate and maintain comfort rounds  - Make Fall Risk Sign visible to staff  - Offer Toileting  - Obtain necessary fall risk management equipment  - Apply yellow socks and bracelet for high fall risk patients  - Consider moving patient to room near nurses station  Outcome: Progressing  Goal: Maintain or return to baseline ADL function  Description: INTERVENTIONS:  -  Assess patient's ability to carry out ADLs; assess patient's baseline for ADL function and identify physical deficits which impact ability to perform ADLs (bathing, care of mouth/teeth, toileting, grooming,  dressing, etc.)  - Assess/evaluate cause of self-care deficits   - Assess range of motion  - Assess patient's mobility; develop plan if impaired  - Assess patient's need for assistive devices and provide as appropriate  - Encourage maximum independence but intervene and supervise when necessary  - Involve family in performance of ADLs  - Assess for home care needs following discharge   - Consider OT consult to assist with ADL evaluation and planning for discharge  - Provide patient education as appropriate  Outcome: Progressing  Goal: Maintains/Returns to pre admission functional level  Description: INTERVENTIONS:  - Perform AM-PAC 6 Click Basic Mobility/ Daily Activity assessment daily.  - Set and communicate daily mobility goal to care team and patient/family/caregiver.   - Collaborate with rehabilitation services on mobility goals if consulted  - Perform Range of Motion  - Reposition patient   - Dangle patient   - Stand patient   - Ambulate patient   - Out of bed to chair   - Out of bed for meals  - Out of bed for toileting  - Record patient progress and toleration of activity level   Outcome: Progressing     Problem: DISCHARGE PLANNING  Goal: Discharge to home or other facility with appropriate resources  Description: INTERVENTIONS:  - Identify barriers to discharge w/patient and caregiver  - Arrange for needed discharge resources and transportation as appropriate  - Identify discharge learning needs (meds, wound care, etc.)  - Arrange for interpretive services to assist at discharge as needed  - Refer to Case Management Department for coordinating discharge planning if the patient needs post-hospital services based on physician/advanced practitioner order or complex needs related to functional status, cognitive ability, or social support system  Outcome: Progressing     Problem: Knowledge Deficit  Goal: Patient/family/caregiver demonstrates understanding of disease process, treatment plan, medications, and  discharge instructions  Description: Complete learning assessment and assess knowledge base.  Interventions:  - Provide teaching at level of understanding  - Provide teaching via preferred learning methods  Outcome: Progressing     Problem: Nutrition/Hydration-ADULT  Goal: Nutrient/Hydration intake appropriate for improving, restoring or maintaining nutritional needs  Description: Monitor and assess patient's nutrition/hydration status for malnutrition. Collaborate with interdisciplinary team and initiate plan and interventions as ordered.  Monitor patient's weight and dietary intake as ordered or per policy. Utilize nutrition screening tool and intervene as necessary. Determine patient's food preferences and provide high-protein, high-caloric foods as appropriate.     INTERVENTIONS:  - Monitor oral intake, urinary output, labs, and treatment plans  - Assess nutrition and hydration status and recommend course of action  - Evaluate amount of meals eaten  - Assist patient with eating if necessary   - Allow adequate time for meals  - Recommend/ encourage appropriate diets, oral nutritional supplements, and vitamin/mineral supplements  - Order, calculate, and assess calorie counts as needed  - Recommend, monitor, and adjust tube feedings and TPN/PPN based on assessed needs  - Assess need for intravenous fluids  - Provide specific nutrition/hydration education as appropriate  - Include patient/family/caregiver in decisions related to nutrition  Outcome: Progressing

## 2024-11-17 NOTE — PROGRESS NOTES
Progress Note - Critical Care/ICU   Name: Isael Avendano 69 y.o. male I MRN: [id number]  Unit/Bed#: ICU 01 I Date of Admission: 11/15/2024   Date of Service: 11/17/2024 I Hospital Day: 2      Assessment & Plan  Shock (HCC)  Hx:significant for decompensated cirrhosis, ?SBP, cellulitis; Exam: Left leg swelling  Labs: lactic acid 3.0, wbc 10.04 (>50% from base line), body fluid neutrophil 238.6 , BUN 39, CR 1. 67 Imaging: Cxr - increased interstitial markings  Ucx - gram negative rods, Bcx no growth at 24, peritoneal fluid culture - no growth at 23     Assessment: Suspect hypovolemic shock 2/2 sepsis and decompensated cirrhosis causing third-spacing and intravascular volume depletion despite total body volume up.   Treated with ampicillin until night prior to presentation      Plan:  Cefepime 2 g q8 for suspected nosocomial SBP, LLE cellulitis, and UTI  Consider continue vancomycin till bcx 24hr  Cultures pending, f/u  Wean pressors as able, currently on Levo 12 mcg/min via PICC    Decompensated cirrhosis (HCC)  Hx:HCC, prior history of heavy etoh use; Exam: abdominal distention  MELD 25    Assessment: Prior history of SBP, not on prophylaxis    Plan:  GI consult  Daily MELD labs  Continue PTA lactulose 20 g bid, adjust as needed for 3-4 loose BM per day  Consult IR for paracentesis vs bedside procedure  Diabetes (HCC)  Lab Results   Component Value Date    HGBA1C 6.7 (H) 10/23/2024       Recent Labs     11/16/24  1129 11/16/24  1614 11/16/24  2143 11/17/24  0755   POCGLU 241* 247* 326* 208*       Blood Sugar Average: Last 72 hrs:  (P) 217.1384175880262425    Assessment:   BG currently at goal    Plan:  - Lantus 5 unit   - meal time sliding scale    Hyponatremia  Na: 130, 131 corrected on admission  Na improved to 134 corrected today    Suspect hypovolemic hyponatremia secondary to decompensated cirrhosis with ascites and volume overload    Monitor serum sodium on am BMP  Rest of plan as above  ABILIO (acute kidney injury)  (HCC)  Suspect prerenal secondary to shock state, possible component of hepatorenal  Cr down-trending today from 1.67 to 1.52 (baseline 0.7-1.0)    Bumex 2 mg am, 1 mg pm  Albumin 25% 12.5 g Q6H  Hold home spironolactone w K 4.5  Monitor UOP, lytes, renal indices and adjust regimen accordingly  Pancytopenia (HCC)  Chronic, stable  Worked up previously, secondary to hypersplenism.  SBP (spontaneous bacterial peritonitis) (HCC)  As noted under shock  Cellulitis of left lower extremity  As noted under shock  Moderate protein-calorie malnutrition (HCC)  Malnutrition Findings:   Adult Malnutrition type: Acute illness  Adult Degree of Malnutrition: Malnutrition of moderate degree  Malnutrition Characteristics: Inadequate energy, Other (comment) (edema, ascited, skin breakdown)                  360 Statement: Malnutrition of moderate degree in the context of chronic illness as evidenced by edema, ascites, inadequate energy intake, skin breadown r/t liver disease. Treated with oral diet and will supplement with high kcal and protein supplement.    BMI Findings:           Body mass index is 36.28 kg/m².     Disposition: Critical care    ICU Core Measures     A: Assess, Prevent, and Manage Pain Has pain been assessed? Yes  Need for changes to pain regimen? No   B: Both SAT/SAT  N/A   C: Choice of Sedation RASS Goal: 0 Alert and Calm  Need for changes to sedation or analgesia regimen? No   D: Delirium CAM-ICU: Negative   E: Early Mobility  Plan for early mobility? Yes   F: Family Engagement Plan for family engagement today? Yes       Antibiotic Review: Patient on appropriate coverage based on culture data.     Review of Invasive Devices:    Tejeda Plan: Continue for accurate I/O monitoring for 48 hours  Central access plan:  consider central line for prolonged pressor use      Prophylaxis:  VTE VTE covered by:  heparin (porcine), Subcutaneous, 5,000 Units at 11/17/24 0521       Stress Ulcer  not orderedcovered bypantoprazole  (PROTONIX) 40 mg tablet [[id number]] (Long-Term Med)         24 Hour Events : no acute events over night.  Subjective   complains of LE pain, intermittent, fluctuation in presentation.   Denies any new symptoms of chest pain, sob, abdominal pain, fever, chills, headache.   Review of Systems: See HPI for Review of Systems    Objective :                   Vitals I/O      Most Recent Min/Max in 24hrs   Temp 98 °F (36.7 °C) Temp  Min: 98 °F (36.7 °C)  Max: 98.2 °F (36.8 °C)   Pulse (!) 117 Pulse  Min: 92  Max: 122   Resp 19 Resp  Min: 12  Max: 36   BP 99/54 BP  Min: 85/50  Max: 136/62   O2 Sat 99 % SpO2  Min: 97 %  Max: 100 %      Intake/Output Summary (Last 24 hours) at 11/17/2024 0804  Last data filed at 11/17/2024 0008  Gross per 24 hour   Intake 1424.2 ml   Output 1345 ml   Net 79.2 ml       Diet Alan/CHO Controlled; Consistent Carbohydrate Diet Level 2 (5 carb servings/75 grams CHO/meal); Fluid Restriction 1800 ML, Sodium 2 GM    Invasive Monitoring           Physical Exam   Physical Exam  Eyes:      Extraocular Movements: Extraocular movements intact.   Skin:     General: Skin is warm and dry.   HENT:      Head: Atraumatic.      Mouth/Throat:      Mouth: Mucous membranes are moist.   Neck:      Vascular: No JVD.   Cardiovascular:      Rate and Rhythm: Regular rhythm. Tachycardia present.      Heart sounds: Murmur (systolic) heard.   Musculoskeletal:      Right lower leg: 3+ Edema present.      Left lower leg: 3+ Edema present.      Comments: Increased warmth compared previously, swelling remains the same.    Abdominal: General: There is distension.     Tenderness: There is abdominal tenderness (mild generalized tenderness, improved since admission). There is no guarding.   Constitutional:       Appearance: He is ill-appearing.   Pulmonary:      Effort: Pulmonary effort is normal.      Breath sounds: Normal breath sounds.   Neurological:      Mental Status: He is alert.   Genitourinary/Anorectal:  Tejeda present.         Diagnostic Studies        Lab Results: I have reviewed the following results:     Medications:  Scheduled PRN   Albumin 25%, 12.5 g, Q6H  bumetanide, 1 mg, BID  cefepime, 1,000 mg, Q12H  chlorhexidine, 15 mL, Q12H LEEANNE  heparin (porcine), 5,000 Units, Q8H LEEANNE  insulin lispro, 1-5 Units, HS  insulin lispro, 1-6 Units, TID AC  lactulose, 20 g, BID  vancomycin, 12.5 mg/kg (Adjusted), Once      oxyCODONE, 5 mg, Q6H PRN  vancomycin, 12.5 mg/kg (Adjusted), Daily PRN       Continuous    norepinephrine, 1-30 mcg/min, Last Rate: 6 mcg/min (11/17/24 0425)         Labs:   CBC    Recent Labs     11/16/24 0428 11/17/24 0424   WBC 10.33* 6.71   HGB 7.9* 7.4*   HCT 25.6* 23.8*   PLT 81* 60*     BMP    Recent Labs     11/16/24 0542 11/17/24 0421   SODIUM 132* 134*   K 4.5 4.3    106   CO2 20* 23   AGAP 6 5   BUN 38* 35*   CREATININE 1.52* 1.29   CALCIUM 8.0* 8.6       Coags    Recent Labs     11/16/24  0329 11/17/24 0421   INR 2.01* 1.77*   PTT 41*  --         Additional Electrolytes  Recent Labs     11/16/24  0542 11/17/24 0421 11/17/24 0424   MG 1.8* 2.0  --    PHOS 3.8 2.8  --    CAIONIZED  --   --  1.16          Blood Gas    No recent results  Recent Labs     11/16/24  0759   PHVEN 7.388   PYJ2EOT 35.2*   PO2VEN 40.9   ZLH5NAE 20.7*   BEVEN -3.8   D7NKTTW 71.2    LFTs  Recent Labs     11/16/24 0542 11/17/24 0421   ALT 26 28   AST 28 36   ALKPHOS 214* 236*   ALB 2.4* 2.6*   TBILI 3.02* 2.65*       Infectious  Recent Labs     11/16/24 0428   PROCALCITONI 2.01*     Glucose  Recent Labs     11/15/24  1216 11/16/24  0542 11/17/24 0421   GLUC 171* 198* 249*

## 2024-11-17 NOTE — ASSESSMENT & PLAN NOTE
Lab Results   Component Value Date    HGBA1C 6.7 (H) 10/23/2024       Recent Labs     11/16/24  1129 11/16/24  1614 11/16/24  2143 11/17/24  0755   POCGLU 241* 247* 326* 208*       Blood Sugar Average: Last 72 hrs:  (P) 217.9268732074735514    Assessment:   BG currently at goal    Plan:  - Lantus 5 unit   - meal time sliding scale

## 2024-11-17 NOTE — ASSESSMENT & PLAN NOTE
Na: 130, 131 corrected on admission  Na improved to 134 corrected today    Suspect hypovolemic hyponatremia secondary to decompensated cirrhosis with ascites and volume overload    Monitor serum sodium on am BMP  Rest of plan as above

## 2024-11-17 NOTE — ASSESSMENT & PLAN NOTE
Malnutrition Findings:   Adult Malnutrition type: Acute illness  Adult Degree of Malnutrition: Malnutrition of moderate degree  Malnutrition Characteristics: Inadequate energy, Other (comment) (edema, ascited, skin breakdown)                  360 Statement: Malnutrition of moderate degree in the context of chronic illness as evidenced by edema, ascites, inadequate energy intake, skin breadown r/t liver disease. Treated with oral diet and will supplement with high kcal and protein supplement.    BMI Findings:           Body mass index is 36.28 kg/m².

## 2024-11-17 NOTE — PLAN OF CARE
Problem: Potential for Falls  Goal: Patient will remain free of falls  Description: INTERVENTIONS:  - Educate patient/family on patient safety including physical limitations  - Instruct patient to call for assistance with activity   - Consult OT/PT to assist with strengthening/mobility   - Keep Call bell within reach  - Keep bed low and locked with side rails adjusted as appropriate  - Keep care items and personal belongings within reach  - Initiate and maintain comfort rounds  - Make Fall Risk Sign visible to staff  - Apply yellow socks and bracelet for high fall risk patients  - Consider moving patient to room near nurses station  Outcome: Progressing     Problem: Prexisting or High Potential for Compromised Skin Integrity  Goal: Skin integrity is maintained or improved  Description: INTERVENTIONS:  - Identify patients at risk for skin breakdown  - Assess and monitor skin integrity  - Assess and monitor nutrition and hydration status  - Monitor labs   - Assess for incontinence   - Turn and reposition patient  - Assist with mobility/ambulation  - Relieve pressure over bony prominences  - Avoid friction and shearing  - Provide appropriate hygiene as needed including keeping skin clean and dry  - Evaluate need for skin moisturizer/barrier cream  - Collaborate with interdisciplinary team   - Patient/family teaching  - Consider wound care consult   Outcome: Progressing     Problem: PAIN - ADULT  Goal: Verbalizes/displays adequate comfort level or baseline comfort level  Description: Interventions:  - Encourage patient to monitor pain and request assistance  - Assess pain using appropriate pain scale  - Administer analgesics based on type and severity of pain and evaluate response  - Implement non-pharmacological measures as appropriate and evaluate response  - Consider cultural and social influences on pain and pain management  - Notify physician/advanced practitioner if interventions unsuccessful or patient reports  new pain  Outcome: Progressing     Problem: INFECTION - ADULT  Goal: Absence or prevention of progression during hospitalization  Description: INTERVENTIONS:  - Assess and monitor for signs and symptoms of infection  - Monitor lab/diagnostic results  - Monitor all insertion sites, i.e. indwelling lines, tubes, and drains  - Monitor endotracheal if appropriate and nasal secretions for changes in amount and color  - [place] appropriate cooling/warming therapies per order  - Administer medications as ordered  - Instruct and encourage patient and family to use good hand hygiene technique  - Identify and instruct in appropriate isolation precautions for identified infection/condition  Outcome: Progressing  Goal: Absence of fever/infection during neutropenic period  Description: INTERVENTIONS:  - Monitor WBC    Outcome: Progressing     Problem: SAFETY ADULT  Goal: Patient will remain free of falls  Description: INTERVENTIONS:  - Educate patient/family on patient safety including physical limitations  - Instruct patient to call for assistance with activity   - Consult OT/PT to assist with strengthening/mobility   - Keep Call bell within reach  - Keep bed low and locked with side rails adjusted as appropriate  - Keep care items and personal belongings within reach  - Initiate and maintain comfort rounds  - Make Fall Risk Sign visible to staff  - Apply yellow socks and bracelet for high fall risk patients  - Consider moving patient to room near nurses station  Outcome: Progressing  Goal: Maintain or return to baseline ADL function  Description: INTERVENTIONS:  -  Assess patient's ability to carry out ADLs; assess patient's baseline for ADL function and identify physical deficits which impact ability to perform ADLs (bathing, care of mouth/teeth, toileting, grooming, dressing, etc.)  - Assess/evaluate cause of self-care deficits   - Assess range of motion  - Assess patient's mobility; develop plan if impaired  - Assess  patient's need for assistive devices and provide as appropriate  - Encourage maximum independence but intervene and supervise when necessary  - Involve family in performance of ADLs  - Assess for home care needs following discharge   - Consider OT consult to assist with ADL evaluation and planning for discharge  - Provide patient education as appropriate  Outcome: Progressing  Goal: Maintains/Returns to pre admission functional level  Description: INTERVENTIONS:  - Perform AM-PAC 6 Click Basic Mobility/ Daily Activity assessment daily.  - Set and communicate daily mobility goal to care team and patient/family/caregiver.   - Collaborate with rehabilitation services on mobility goals if consulted  - Out of bed for toileting  - Record patient progress and toleration of activity level   Outcome: Progressing     Problem: DISCHARGE PLANNING  Goal: Discharge to home or other facility with appropriate resources  Description: INTERVENTIONS:  - Identify barriers to discharge w/patient and caregiver  - Arrange for needed discharge resources and transportation as appropriate  - Identify discharge learning needs (meds, wound care, etc.)  - Arrange for interpretive services to assist at discharge as needed  - Refer to Case Management Department for coordinating discharge planning if the patient needs post-hospital services based on physician/advanced practitioner order or complex needs related to functional status, cognitive ability, or social support system  Outcome: Progressing     Problem: Knowledge Deficit  Goal: Patient/family/caregiver demonstrates understanding of disease process, treatment plan, medications, and discharge instructions  Description: Complete learning assessment and assess knowledge base.  Interventions:  - Provide teaching at level of understanding  - Provide teaching via preferred learning methods  Outcome: Progressing     Problem: Nutrition/Hydration-ADULT  Goal: Nutrient/Hydration intake appropriate for  improving, restoring or maintaining nutritional needs  Description: Monitor and assess patient's nutrition/hydration status for malnutrition. Collaborate with interdisciplinary team and initiate plan and interventions as ordered.  Monitor patient's weight and dietary intake as ordered or per policy. Utilize nutrition screening tool and intervene as necessary. Determine patient's food preferences and provide high-protein, high-caloric foods as appropriate.     INTERVENTIONS:  - Monitor oral intake, urinary output, labs, and treatment plans  - Assess nutrition and hydration status and recommend course of action  - Evaluate amount of meals eaten  - Assist patient with eating if necessary   - Allow adequate time for meals  - Recommend/ encourage appropriate diets, oral nutritional supplements, and vitamin/mineral supplements  - Order, calculate, and assess calorie counts as needed  - Recommend, monitor, and adjust tube feedings and TPN/PPN based on assessed needs  - Assess need for intravenous fluids  - Provide specific nutrition/hydration education as appropriate  - Include patient/family/caregiver in decisions related to nutrition  Outcome: Progressing

## 2024-11-18 PROBLEM — N39.0 UTI (URINARY TRACT INFECTION): Status: ACTIVE | Noted: 2024-11-18

## 2024-11-18 NOTE — ASSESSMENT & PLAN NOTE
Urine cx positive for >100k klebsiella and 20-29k enterococcus faecalis    Plan:  Continue cefepime 1g q12h

## 2024-11-18 NOTE — ASSESSMENT & PLAN NOTE
Lab Results   Component Value Date    HGBA1C 6.7 (H) 10/23/2024       Recent Labs     11/17/24  1106 11/17/24  1625 11/17/24  2129 11/18/24  0727   POCGLU 317* 406* 386* 311*       Blood Sugar Average: Last 72 hrs:  (P) 260.3661937542099802    Assessment:   BG elevated in the 300s  Started on insulin drip    Plan:  - Lantus 5 unit   - meal time sliding scale  - Insulin drip

## 2024-11-18 NOTE — ASSESSMENT & PLAN NOTE
Creatinine elevated in the 1.5-1.6 from a baseline of 0.9-1.1    Plan:  Pending AM labs  Continue diuresis with 1 mg bumex BID

## 2024-11-18 NOTE — ASSESSMENT & PLAN NOTE
Lab Results   Component Value Date    HGBA1C 6.7 (H) 10/23/2024       Recent Labs     11/17/24  1106 11/17/24  1625 11/17/24  2129 11/18/24  0727   POCGLU 317* 406* 386* 311*       Blood Sugar Average: Last 72 hrs:  (P) 260.2748591544742113    Started on insulin drip today, BG in the 300s

## 2024-11-18 NOTE — ASSESSMENT & PLAN NOTE
Managed by hematology/oncology at [facility] as recent as 10/1/2024  Plan was to proceed with TACE however no feasible access to vessels and the procedure was aborted  After receiving immunotherapy with tremelimumab/durvalumab, currently on hold in the setting of acute illness  Will need to inquire about timing of resuming immunotherapy and continuity of care with local oncologist

## 2024-11-18 NOTE — CONSULTS
Vancomycin IV Pharmacy-to-Dose Consultation    Isael Avendano is a 69 y.o. male who was receiving Vancomycin IV with management by the Pharmacy Consult service for treatment of Soft tissue (goal -600, trough >10)  .       The patient’s Vancomycin therapy has been discontinued. Thank you for allowing us to take part in this patient's care. Pharmacy will sign-off now; please call or re-consult if there are any questions.      Tika Whitlock, PharmD  Critical Care & Internal Medicine Clinical Pharmacist   Available via Epic Secure Chat

## 2024-11-18 NOTE — ASSESSMENT & PLAN NOTE
Chronic, stable  Pt presented with a leukocytosis of 10 (baseline of 2-3), 6.71 today with neutrophilic predominance  Platelets decreasing from 70 to 54 today  Worked up previously, secondary to hypersplenism.

## 2024-11-18 NOTE — ASSESSMENT & PLAN NOTE
Likely in the setting of shock state, see above for details and plan  Renal function showing signs of recovery, continue to optimize hemodynamics and monitor CMP and urine output

## 2024-11-18 NOTE — ASSESSMENT & PLAN NOTE
Cultures positive for greater than 100,000 CFU per milliliter Klebsiella pneumonia, 20-30,000 CFU per milliliter Enterococcus faecalis  On IV cefepime, day #4

## 2024-11-18 NOTE — PROCEDURES
Paracentesis (Bedside)    Date/Time: 11/18/2024 3:00 PM    Performed by: Favian Gore MD  Authorized by: Favian Gore MD    Patient location:  Bedside  Other Assisting Provider: No    Consent:     Consent obtained:  Written    Consent given by:  Spouse    Risks discussed:  Bleeding, pain, infection and bowel perforation  Universal protocol:     Relevant documents present and verified: yes      Test results available and properly labeled: yes      Radiology Images displayed and confirmed.  If images not available, report reviewed: yes      Site/side marked: yes      Patient identity confirmed:  Arm band  Pre-procedure details:     Initial or Subsequent Exam:  Subsequent    Procedure purpose:  Therapeutic    Indications: abdominal discomfort secondary to ascites    Anesthesia (see MAR for exact dosages):     Anesthesia method:  Local infiltration    Local anesthetic:  Lidocaine 1% w/o epi  Procedure details:     Needle gauge:  18    Ultrasound guidance: yes      Ultrasound image availability:  Not saved    Approach:  Percutaneous    Puncture site:  L lower quadrant    Fluid appearance:  Cloudy and yellow    Dressing:  Adhesive bandage  Post-procedure details:     Patient tolerance of procedure:  Tolerated well, no immediate complications  Post-procedure medication (see MAR for dosing):     Albumin replacement: No

## 2024-11-18 NOTE — ASSESSMENT & PLAN NOTE
Cirrhosis secondary to alcohol abuse, decompensation likely from septic shock  Patient seen in the emergency department on November 12 recurrent ascites where paracentesis was performed and he was discharged home -5 L of fluid removed at that time.  Patient declined admission during that visit.  Return to the emergency department 3 days later with lower extremity swelling found to have cellulitis and septic shock -paracentesis performed on admission  Fluid analysis from November 15 show ascitic fluid neutrophils of 239, no bacteria growth  Fluid now reaccumulating in the abdomen causing discomfort, physical exam shows mildly tender and largely distended abdomen with positive fluid wave shift, patient is AAO x 4   MELD 3.0: 21 at 11/18/2024  7:46 AM  MELD-Na: 21 at 11/18/2024  7:46 AM  Calculated from:  Serum Creatinine: 1.21 mg/dL at 11/18/2024  7:46 AM  Serum Sodium: 134 mmol/L at 11/18/2024  7:46 AM  Total Bilirubin: 2.73 mg/dL at 11/17/2024  3:46 PM  Serum Albumin: 2.7 g/dL at 11/17/2024  3:46 PM  INR(ratio): 1.77 at 11/17/2024  4:21 AM  Age at listing (hypothetical): 69 years  Sex: Male at 11/18/2024  7:46 AM  Management of volume status and ascites is careful balance as patient is being treated for septic shock with pressors -would prioritize optimizing hemodynamics prior to initiating more aggressive pharmacologic management of ascitic fluid  Patient is scheduled for therapeutic IR paracentesis today, continue albumin repletion  Alcohol cessation, low-salt diet, fluid restriction  Continue Bumex 1 mg twice daily, wait for further hemodynamic stability until incorporation of Aldactone  Agree with de-escalation of lactulose to 20 g daily as patient is having sufficient bowel movements  Check ammonia, consider adding rifaximin given fluctuating mental status  Monitor MELD labs

## 2024-11-18 NOTE — ASSESSMENT & PLAN NOTE
Suspect prerenal secondary to shock state, possible component of hepatorenal  Cr down-trending today from 1.67 to 1.52 (baseline 0.7-1.0)    Plan:  Bumex 2 mg am, 1 mg pm  Albumin 25% 12.5 g Q6H  Hold home spironolactone w K 4.5  Monitor UOP, lytes, renal indices and adjust regimen accordingly

## 2024-11-18 NOTE — ASSESSMENT & PLAN NOTE
Lab Results   Component Value Date    HGBA1C 6.7 (H) 10/23/2024       Recent Labs     11/17/24 2129 11/18/24  0727 11/18/24  0957 11/18/24  1152   POCGLU 386* 311* 233* 215*       Blood Sugar Average: Last 72 hrs:  (P) [id number]  Continue insulin drip, management per primary team

## 2024-11-18 NOTE — PROGRESS NOTES
Progress Note - Critical Care/ICU   Name: Isael Avendano 69 y.o. male I MRN: [id number]  Unit/Bed#: ICU 01 I Date of Admission: 11/15/2024   Date of Service: 11/18/2024 I Hospital Day: 3      Assessment & Plan  Shock (HCC)  Hx:significant for decompensated cirrhosis, ?SBP, cellulitis; Exam: Left leg swelling  Labs: lactic acid 3.0, wbc 10.04 (>50% from base line), body fluid neutrophil 238.6 , BUN 39, CR 1. 67 Imaging: Cxr - increased interstitial markings  Ucx - gram negative rods, Bcx no growth at 24, peritoneal fluid culture - no growth at 23     Assessment: Suspect hypovolemic shock 2/2 sepsis and decompensated cirrhosis causing third-spacing and intravascular volume depletion despite total body volume up.   Treated with ampicillin until night prior to presentation      Plan:  Cefepime 2 g q8 for suspected nosocomial SBP, LLE cellulitis, and UTI  Continue albumin 12.5 g 25% q6h  Cultures pending, f/u  Wean pressors as able, currently on Levo 12 mcg/min via PICC    Decompensated cirrhosis (HCC)  Hx:HCC, prior history of heavy etoh use; Exam: abdominal distention  MELD 25    Assessment: Prior history of SBP, not on prophylaxis    Plan:  GI consult  Daily MELD labs  Continue PTA lactulose 20 g bid, adjust as needed for 3-4 loose BM per day  IR consulted for paracentesis vs bedside procedure  Diabetes (HCC)  Lab Results   Component Value Date    HGBA1C 6.7 (H) 10/23/2024       Recent Labs     11/17/24  1106 11/17/24  1625 11/17/24  2129 11/18/24  0727   POCGLU 317* 406* 386* 311*       Blood Sugar Average: Last 72 hrs:  (P) [id number]    Assessment:   BG elevated in the 300s  Started on insulin drip    Plan:  - Lantus 5 unit   - meal time sliding scale  - Insulin drip  Hyponatremia  Na: 130, 131 corrected on admission  Na improved to 134 corrected today  Suspect hypovolemic hyponatremia secondary to decompensated cirrhosis with ascites and volume overload    Plan  Monitor serum sodium on am BMP  Rest of plan  as above  ABILIO (acute kidney injury) (HCC)  Suspect prerenal secondary to shock state, possible component of hepatorenal  Cr down-trending today from 1.67 to 1.52 (baseline 0.7-1.0)    Plan:  Bumex 2 mg am, 1 mg pm  Albumin 25% 12.5 g Q6H  Hold home spironolactone w K 4.5  Monitor UOP, lytes, renal indices and adjust regimen accordingly  Pancytopenia (HCC)  Chronic, stable  Pt presented with a leukocytosis of 10 (baseline of 2-3), 6.71 today with neutrophilic predominance  Platelets decreasing from 70 to 54 today  Worked up previously, secondary to hypersplenism.      SBP (spontaneous bacterial peritonitis) (HCC)  As noted under shock  Cellulitis of left lower extremity  As noted under shock  Moderate protein-calorie malnutrition (HCC)  Malnutrition Findings:   Adult Malnutrition type: Acute illness  Adult Degree of Malnutrition: Malnutrition of moderate degree  Malnutrition Characteristics: Inadequate energy, Other (comment) (edema, ascited, skin breakdown)                  360 Statement: Malnutrition of moderate degree in the context of chronic illness as evidenced by edema, ascites, inadequate energy intake, skin breadown r/t liver disease. Treated with oral diet and will supplement with high kcal and protein supplement.    BMI Findings:           Body mass index is 36.28 kg/m².     Stage 3a chronic kidney disease (HCC)  Lab Results   Component Value Date    EGFR 62 11/17/2024    EGFR 55 11/17/2024    EGFR 45 11/16/2024    CREATININE 1.17 11/17/2024    CREATININE 1.29 11/17/2024    CREATININE 1.52 (H) 11/16/2024     Hepatocellular carcinoma (HCC)  PICC line in place, receiving immunotherapy  UTI (urinary tract infection)  Urine cx positive for >100k klebsiella and 20-29k enterococcus faecalis    Plan:  Continue cefepime 1g q12h  Disposition: Critical care    ICU Core Measures     A: Assess, Prevent, and Manage Pain Has pain been assessed? Yes  Need for changes to pain regimen? No   B: Both SAT/SAT  N/A   C: Choice  of Sedation RASS Goal: -1 Drowsy  Need for changes to sedation or analgesia regimen? No   D: Delirium CAM-ICU: Negative   E: Early Mobility  Plan for early mobility? Yes   F: Family Engagement Plan for family engagement today? Yes       Antibiotic Review: Patient on appropriate coverage based on culture data.  and Awaiting culture results.     Review of Invasive Devices:    Tejeda Plan: Tejeda placed by urology. Removal plans per their team  Central access plan: Patient requires PICC secondary to immunotherapy for hepatocellular carcinoma      Prophylaxis:  VTE VTE covered by:  heparin (porcine), Subcutaneous, 5,000 Units at 11/18/24 0516       Stress Ulcer  not orderedcovered bypantoprazole (PROTONIX) 40 mg tablet [[id number]] (Long-Term Med)         24 Hour Events : afib with RVR, lopressor x 2  Subjective  Pt is somnolent this morning. He complains of belly pain, secondary to distension/ascites. He also complains of LLE pain.    Review of Systems: Review of Systems   Constitutional:  Positive for fatigue. Negative for fever.   Respiratory:  Negative for cough, shortness of breath and wheezing.    Cardiovascular:  Positive for leg swelling. Negative for chest pain and palpitations.   Gastrointestinal:  Positive for abdominal distention and abdominal pain. Negative for nausea and vomiting.   Neurological:  Negative for tremors, weakness, light-headedness and headaches.       Objective :                   Vitals I/O      Most Recent Min/Max in 24hrs   Temp 97.5 °F (36.4 °C) Temp  Min: 97.5 °F (36.4 °C)  Max: 98.7 °F (37.1 °C)   Pulse (!) 122 Pulse  Min: 111  Max: 138   Resp 14 Resp  Min: 12  Max: 32   /55 BP  Min: 77/53  Max: 123/60   O2 Sat 100 % SpO2  Min: 96 %  Max: 100 %      Intake/Output Summary (Last 24 hours) at 11/18/2024 0822  Last data filed at 11/18/2024 0754  Gross per 24 hour   Intake 1611.62 ml   Output 2097 ml   Net -485.38 ml       Diet [place]/CHO Controlled; Consistent Carbohydrate Diet Level 2 (5  carb servings/75 grams CHO/meal); Fluid Restriction 1800 ML, Sodium 2 GM    Invasive Monitoring           Physical Exam   Physical Exam  Vitals reviewed.   Eyes:      General: No scleral icterus.     Extraocular Movements: Extraocular movements intact.      Conjunctiva/sclera: Conjunctivae normal.   Skin:     General: Skin is warm and dry.   HENT:      Head: Normocephalic and atraumatic.      Mouth/Throat:      Mouth: Mucous membranes are dry.   Cardiovascular:      Rate and Rhythm: Regular rhythm. Tachycardia present.      Pulses: Normal pulses.      Heart sounds: Normal heart sounds.   Musculoskeletal:         General: Tenderness (LLE > RLE) present.      Right lower le+ Edema present.      Left lower le+ Edema present.   Abdominal: General: Abdomen is protuberant. Bowel sounds are absent. There is distension.     Palpations: There is shifting dullness.      Tenderness: There is generalized abdominal tenderness.   Constitutional:       Appearance: He is ill-appearing.   Pulmonary:      Effort: Pulmonary effort is normal. No accessory muscle usage, respiratory distress or accessory muscle usage.   Psychiatric:         Speech: Speech is not no expressive aphasia.   Neurological:      General: No focal deficit present.      Mental Status: He is oriented to person, place and time. He is somnolent and calm. He is CAM ICU negative.      Cranial Nerves: No dysarthria or facial asymmetry.   Genitourinary/Anorectal:  Tejeda present.        Diagnostic Studies        Lab Results: I have reviewed the following results:     Medications:  Scheduled PRN   Albumin 25%, 12.5 g, Q6H  [Held by provider] bumetanide, 1 mg, BID  cefepime, 1,000 mg, Q12H  chlorhexidine, 15 mL, Q12H LEEANNE  heparin (porcine), 5,000 Units, Q8H LEEANNE  lactulose, 20 g, BID      oxyCODONE, 5 mg, Q6H PRN  vancomycin, 12.5 mg/kg (Adjusted), Daily PRN       Continuous    insulin regular (HumuLIN R,NovoLIN R) 1 Units/mL in sodium chloride 0.9 % 100 mL  infusion, 0.3-21 Units/hr, Last Rate: 6 Units/hr (11/18/24 0726)  norepinephrine, 1-30 mcg/min, Last Rate: 7 mcg/min (11/18/24 0729)         Labs:   CBC    Recent Labs     11/17/24 0424 11/18/24  0514   WBC 6.71 8.74   HGB 7.4* 7.4*   HCT 23.8* 25.1*   PLT 60* 54*     BMP    Recent Labs     11/17/24 0421 11/17/24  1546   SODIUM 134* 135   K 4.3 4.2    108   CO2 23 21   AGAP 5 6   BUN 35* 32*   CREATININE 1.29 1.17   CALCIUM 8.6 8.1*       Coags    Recent Labs     11/17/24 0421   INR 1.77*        Additional Electrolytes  Recent Labs     11/17/24 0421 11/17/24 0424 11/18/24  0514   MG 2.0  --  2.5   PHOS 2.8  --  2.5   CAIONIZED  --  1.16 1.12          Blood Gas    No recent results  No recent results LFTs  Recent Labs     11/17/24 0421 11/17/24  1546   ALT 28 25   AST 36 31   ALKPHOS 236* 236*   ALB 2.6* 2.7*   TBILI 2.65* 2.73*       Infectious  No recent results  Glucose  Recent Labs     11/17/24 0421 11/17/24  1546   GLUC 249* 395*

## 2024-11-18 NOTE — ASSESSMENT & PLAN NOTE
Hx:significant for decompensated cirrhosis, ?SBP, cellulitis; Exam: Left leg swelling  Labs: lactic acid 3.0, wbc 10.04 (>50% from base line), body fluid neutrophil 238.6 , BUN 39, CR 1. 67 Imaging: Cxr - increased interstitial markings  Ucx - gram negative rods, Bcx no growth at 24, peritoneal fluid culture - no growth at 23     Assessment: Suspect hypovolemic shock 2/2 sepsis and decompensated cirrhosis causing third-spacing and intravascular volume depletion despite total body volume up.   Treated with ampicillin until night prior to presentation      Plan:  Cefepime 2 g q8 for suspected nosocomial SBP, LLE cellulitis, and UTI  Continue albumin 12.5 g 25% q6h  Cultures pending, f/u  Wean pressors as able, currently on Levo 12 mcg/min via PICC

## 2024-11-18 NOTE — PLAN OF CARE
Problem: Potential for Falls  Goal: Patient will remain free of falls  Description: INTERVENTIONS:  - Educate patient/family on patient safety including physical limitations  - Instruct patient to call for assistance with activity   - Consult OT/PT to assist with strengthening/mobility   - Keep Call bell within reach  - Keep bed low and locked with side rails adjusted as appropriate  - Keep care items and personal belongings within reach  - Initiate and maintain comfort rounds  - Make Fall Risk Sign visible to staff  - Offer Toileting every 2 Hours, in advance of need  - Initiate/Maintain bed alarm  - Obtain necessary fall risk management equipment:   - Apply yellow socks and bracelet for high fall risk patients  - Consider moving patient to room near nurses station  Outcome: Progressing     Problem: Prexisting or High Potential for Compromised Skin Integrity  Goal: Skin integrity is maintained or improved  Description: INTERVENTIONS:  - Identify patients at risk for skin breakdown  - Assess and monitor skin integrity  - Assess and monitor nutrition and hydration status  - Monitor labs   - Assess for incontinence   - Turn and reposition patient  - Assist with mobility/ambulation  - Relieve pressure over bony prominences  - Avoid friction and shearing  - Provide appropriate hygiene as needed including keeping skin clean and dry  - Evaluate need for skin moisturizer/barrier cream  - Collaborate with interdisciplinary team   - Patient/family teaching  - Consider wound care consult   Outcome: Progressing     Problem: PAIN - ADULT  Goal: Verbalizes/displays adequate comfort level or baseline comfort level  Description: Interventions:  - Encourage patient to monitor pain and request assistance  - Assess pain using appropriate pain scale  - Administer analgesics based on type and severity of pain and evaluate response  - Implement non-pharmacological measures as appropriate and evaluate response  - Consider cultural and  social influences on pain and pain management  - Notify physician/advanced practitioner if interventions unsuccessful or patient reports new pain  Outcome: Progressing     Problem: INFECTION - ADULT  Goal: Absence or prevention of progression during hospitalization  Description: INTERVENTIONS:  - Assess and monitor for signs and symptoms of infection  - Monitor lab/diagnostic results  - Monitor all insertion sites, i.e. indwelling lines, tubes, and drains  - Monitor endotracheal if appropriate and nasal secretions for changes in amount and color  - [place] appropriate cooling/warming therapies per order  - Administer medications as ordered  - Instruct and encourage patient and family to use good hand hygiene technique  - Identify and instruct in appropriate isolation precautions for identified infection/condition  Outcome: Progressing  Goal: Absence of fever/infection during neutropenic period  Description: INTERVENTIONS:  - Monitor WBC    Outcome: Progressing     Problem: SAFETY ADULT  Goal: Patient will remain free of falls  Description: INTERVENTIONS:  - Educate patient/family on patient safety including physical limitations  - Instruct patient to call for assistance with activity   - Consult OT/PT to assist with strengthening/mobility   - Keep Call bell within reach  - Keep bed low and locked with side rails adjusted as appropriate  - Keep care items and personal belongings within reach  - Initiate and maintain comfort rounds  - Make Fall Risk Sign visible to staff  - Offer Toileting every 2 Hours, in advance of need  - Initiate/Maintain bed alarm  - Obtain necessary fall risk management equipment:   - Apply yellow socks and bracelet for high fall risk patients  - Consider moving patient to room near nurses station  Outcome: Progressing  Goal: Maintain or return to baseline ADL function  Description: INTERVENTIONS:  -  Assess patient's ability to carry out ADLs; assess patient's baseline for ADL function and  identify physical deficits which impact ability to perform ADLs (bathing, care of mouth/teeth, toileting, grooming, dressing, etc.)  - Assess/evaluate cause of self-care deficits   - Assess range of motion  - Assess patient's mobility; develop plan if impaired  - Assess patient's need for assistive devices and provide as appropriate  - Encourage maximum independence but intervene and supervise when necessary  - Involve family in performance of ADLs  - Assess for home care needs following discharge   - Consider OT consult to assist with ADL evaluation and planning for discharge  - Provide patient education as appropriate  Outcome: Progressing  Goal: Maintains/Returns to pre admission functional level  Description: INTERVENTIONS:  - Perform AM-PAC 6 Click Basic Mobility/ Daily Activity assessment daily.  - Set and communicate daily mobility goal to care team and patient/family/caregiver.   - Collaborate with rehabilitation services on mobility goals if consulted  - Perform Range of Motion 3 times a day.  - Reposition patient every 2 hours.  - Dangle patient 2 times a day  - Stand patient 2 times a day  - Ambulate patient 2 times a day  - Out of bed to chair 2 times a day   - Out of bed for meals 2 times a day  - Out of bed for toileting  - Record patient progress and toleration of activity level   Outcome: Progressing     Problem: DISCHARGE PLANNING  Goal: Discharge to home or other facility with appropriate resources  Description: INTERVENTIONS:  - Identify barriers to discharge w/patient and caregiver  - Arrange for needed discharge resources and transportation as appropriate  - Identify discharge learning needs (meds, wound care, etc.)  - Arrange for interpretive services to assist at discharge as needed  - Refer to Case Management Department for coordinating discharge planning if the patient needs post-hospital services based on physician/advanced practitioner order or complex needs related to functional status,  cognitive ability, or social support system  Outcome: Progressing     Problem: Knowledge Deficit  Goal: Patient/family/caregiver demonstrates understanding of disease process, treatment plan, medications, and discharge instructions  Description: Complete learning assessment and assess knowledge base.  Interventions:  - Provide teaching at level of understanding  - Provide teaching via preferred learning methods  Outcome: Progressing     Problem: Nutrition/Hydration-ADULT  Goal: Nutrient/Hydration intake appropriate for improving, restoring or maintaining nutritional needs  Description: Monitor and assess patient's nutrition/hydration status for malnutrition. Collaborate with interdisciplinary team and initiate plan and interventions as ordered.  Monitor patient's weight and dietary intake as ordered or per policy. Utilize nutrition screening tool and intervene as necessary. Determine patient's food preferences and provide high-protein, high-caloric foods as appropriate.     INTERVENTIONS:  - Monitor oral intake, urinary output, labs, and treatment plans  - Assess nutrition and hydration status and recommend course of action  - Evaluate amount of meals eaten  - Assist patient with eating if necessary   - Allow adequate time for meals  - Recommend/ encourage appropriate diets, oral nutritional supplements, and vitamin/mineral supplements  - Order, calculate, and assess calorie counts as needed  - Recommend, monitor, and adjust tube feedings and TPN/PPN based on assessed needs  - Assess need for intravenous fluids  - Provide specific nutrition/hydration education as appropriate  - Include patient/family/caregiver in decisions related to nutrition  Outcome: Progressing

## 2024-11-18 NOTE — PLAN OF CARE
Problem: Potential for Falls  Goal: Patient will remain free of falls  Description: INTERVENTIONS:  - Educate patient/family on patient safety including physical limitations  - Instruct patient to call for assistance with activity   - Consult OT/PT to assist with strengthening/mobility   - Keep Call bell within reach  - Keep bed low and locked with side rails adjusted as appropriate  - Keep care items and personal belongings within reach  - Initiate and maintain comfort rounds  - Make Fall Risk Sign visible to staff  - Offer Toileting every 2 Hours, in advance of need  - Initiate/Maintain bed alarm  - Apply yellow socks and bracelet for high fall risk patients  - Consider moving patient to room near nurses station  Outcome: Progressing     Problem: Prexisting or High Potential for Compromised Skin Integrity  Goal: Skin integrity is maintained or improved  Description: INTERVENTIONS:  - Identify patients at risk for skin breakdown  - Assess and monitor skin integrity  - Assess and monitor nutrition and hydration status  - Monitor labs   - Assess for incontinence   - Turn and reposition patient  - Assist with mobility/ambulation  - Relieve pressure over bony prominences  - Avoid friction and shearing  - Provide appropriate hygiene as needed including keeping skin clean and dry  - Evaluate need for skin moisturizer/barrier cream  - Collaborate with interdisciplinary team   - Patient/family teaching  - Consider wound care consult   Outcome: Progressing     Problem: PAIN - ADULT  Goal: Verbalizes/displays adequate comfort level or baseline comfort level  Description: Interventions:  - Encourage patient to monitor pain and request assistance  - Assess pain using appropriate pain scale  - Administer analgesics based on type and severity of pain and evaluate response  - Implement non-pharmacological measures as appropriate and evaluate response  - Consider cultural and social influences on pain and pain management  -  Notify physician/advanced practitioner if interventions unsuccessful or patient reports new pain  Outcome: Progressing     Problem: INFECTION - ADULT  Goal: Absence or prevention of progression during hospitalization  Description: INTERVENTIONS:  - Assess and monitor for signs and symptoms of infection  - Monitor lab/diagnostic results  - Monitor all insertion sites, i.e. indwelling lines, tubes, and drains  - Monitor endotracheal if appropriate and nasal secretions for changes in amount and color  - [place] appropriate cooling/warming therapies per order  - Administer medications as ordered  - Instruct and encourage patient and family to use good hand hygiene technique  - Identify and instruct in appropriate isolation precautions for identified infection/condition  Outcome: Progressing  Goal: Absence of fever/infection during neutropenic period  Description: INTERVENTIONS:  - Monitor WBC    Outcome: Progressing     Problem: SAFETY ADULT  Goal: Patient will remain free of falls  Description: INTERVENTIONS:  - Educate patient/family on patient safety including physical limitations  - Instruct patient to call for assistance with activity   - Consult OT/PT to assist with strengthening/mobility   - Keep Call bell within reach  - Keep bed low and locked with side rails adjusted as appropriate  - Keep care items and personal belongings within reach  - Initiate and maintain comfort rounds  - Make Fall Risk Sign visible to staff  - Offer Toileting every 2 Hours, in advance of need  - Initiate/Maintain bed alarm  - Apply yellow socks and bracelet for high fall risk patients  - Consider moving patient to room near nurses station  Outcome: Progressing  Goal: Maintain or return to baseline ADL function  Description: INTERVENTIONS:  -  Assess patient's ability to carry out ADLs; assess patient's baseline for ADL function and identify physical deficits which impact ability to perform ADLs (bathing, care of mouth/teeth,  toileting, grooming, dressing, etc.)  - Assess/evaluate cause of self-care deficits   - Assess range of motion  - Assess patient's mobility; develop plan if impaired  - Assess patient's need for assistive devices and provide as appropriate  - Encourage maximum independence but intervene and supervise when necessary  - Involve family in performance of ADLs  - Assess for home care needs following discharge   - Consider OT consult to assist with ADL evaluation and planning for discharge  - Provide patient education as appropriate  Outcome: Progressing  Goal: Maintains/Returns to pre admission functional level  Description: INTERVENTIONS:  - Perform AM-PAC 6 Click Basic Mobility/ Daily Activity assessment daily.  - Set and communicate daily mobility goal to care team and patient/family/caregiver.   - Collaborate with rehabilitation services on mobility goals if consulted  - Reposition patient every 2 hours.  - Out of bed for toileting  - Record patient progress and toleration of activity level   Outcome: Progressing     Problem: Nutrition/Hydration-ADULT  Goal: Nutrient/Hydration intake appropriate for improving, restoring or maintaining nutritional needs  Description: Monitor and assess patient's nutrition/hydration status for malnutrition. Collaborate with interdisciplinary team and initiate plan and interventions as ordered.  Monitor patient's weight and dietary intake as ordered or per policy. Utilize nutrition screening tool and intervene as necessary. Determine patient's food preferences and provide high-protein, high-caloric foods as appropriate.     INTERVENTIONS:  - Monitor oral intake, urinary output, labs, and treatment plans  - Assess nutrition and hydration status and recommend course of action  - Evaluate amount of meals eaten  - Assist patient with eating if necessary   - Allow adequate time for meals  - Recommend/ encourage appropriate diets, oral nutritional supplements, and vitamin/mineral  supplements  - Order, calculate, and assess calorie counts as needed  - Recommend, monitor, and adjust tube feedings and TPN/PPN based on assessed needs  - Assess need for intravenous fluids  - Provide specific nutrition/hydration education as appropriate  - Include patient/family/caregiver in decisions related to nutrition  Outcome: Progressing

## 2024-11-18 NOTE — ASSESSMENT & PLAN NOTE
Lab Results   Component Value Date    EGFR 62 11/17/2024    EGFR 55 11/17/2024    EGFR 45 11/16/2024    CREATININE 1.17 11/17/2024    CREATININE 1.29 11/17/2024    CREATININE 1.52 (H) 11/16/2024

## 2024-11-18 NOTE — DISCHARGE INSTR - OTHER ORDERS
Skin care plans:  1-Cleanse sacro-buttocks with soap and water. Dust open wound beds with stoma powder and apply triad paste overtop TID and as needed with celina care. For celina-care use a pH-balanced wound cleanser to soften Triad, and gently wipe to remove without scrubbing. For celina-care use a pH-balanced wound cleanser to soften Triad, and gently wipe to remove without scrubbing.  It is not necessary to remove all of the paste; only soiled aspects. Do not use Blue bath wipes for pericare.   2-Elevate heels to offload pressure. Apply True Jean/Prevalon boots to bilateral heels.  3-Ehob cushion in chair when out of bed, if able.  4-Moisturize skin daily with skin nourishing cream.  5-Turn/reposition q2h for pressure re-distribution on skin.  6 - Bilateral Heels: Apply Silicone foam dressing to heels, justo RIGHT w/P, Justo LEFT w/ T, peel foam check skin integrity q-shift. Change q3d  7- R lower leg: Irrigate with NSS. Pat dry.  Cover wound bed with Silver Alginate and ABD. Wrap with Nida. ACE wrap from tip of toes to behind knees for compression. Change daily or PRN for soilage/dislodgement.  8-Wash Groin, Perineum and inner thighs with soap and water. Pat Dry. Apply Maryana antifungal cream BID.   9- P 500 low air loss mattress upon transfer out of ICU      Schedule Follow-up Outpatient Wound Appointment. Ambulatory Referral Placed.   Call Outpatient Wound and Ostomy Care Team @ [phone]   Option 1: [place], [place], [place], [place]  Option 2: [facility], [place]

## 2024-11-18 NOTE — ASSESSMENT & PLAN NOTE
Chronic, stable  Previous workups show etiology is a secondary of splenic sequestration  Recent Labs     11/16/24 0428 11/17/24 0424 11/18/24  0514   WBC 10.33* 6.71 8.74     Recent Labs     11/16/24 0428 11/17/24 0424 11/18/24  0514   PLT 81* 60* 54*     Recent Labs     11/16/24 0428 11/17/24 0424 11/18/24  0514   HGB 7.9* 7.4* 7.4*     Continue to monitor CBC and signs of bleeding

## 2024-11-18 NOTE — ASSESSMENT & PLAN NOTE
Na: 130, 131 corrected on admission  Na improved to 134 corrected today  Suspect hypovolemic hyponatremia secondary to decompensated cirrhosis with ascites and volume overload    Plan  Monitor serum sodium on am BMP  Rest of plan as above

## 2024-11-18 NOTE — CONSULTS
Consultation - Hospitalist   Name: Isael Avendano 69 y.o. male I MRN: [id number]  Unit/Bed#: ICU 01 I Date of Admission: 11/15/2024   Date of Service: 11/18/2024 I Hospital Day: 3       Inpatient consult to gastroenterology     Date/Time  11/18/2024 1:59 PM     Performed by  Jaiden Jhaveri MD   Authorized by  Ellen Moctezuma DO           Physician Requesting Evaluation: Favian Gore,*   Reason for Evaluation / Principal Problem: Decompensated cirrhosis    Assessment & Plan  Decompensated cirrhosis (HCC)  Cirrhosis secondary to alcohol abuse, decompensation likely from septic shock  Patient seen in the emergency department on November 12 recurrent ascites where paracentesis was performed and he was discharged home -5 L of fluid removed at that time.  Patient declined admission during that visit.  Return to the emergency department 3 days later with lower extremity swelling found to have cellulitis and septic shock -paracentesis performed on admission  Fluid analysis from November 15 show ascitic fluid neutrophils of 239, no bacteria growth  Fluid now reaccumulating in the abdomen causing discomfort, physical exam shows mildly tender and largely distended abdomen with positive fluid wave shift, patient is AAO x 4   MELD 3.0: 21 at 11/18/2024  7:46 AM  MELD-Na: 21 at 11/18/2024  7:46 AM  Calculated from:  Serum Creatinine: 1.21 mg/dL at 11/18/2024  7:46 AM  Serum Sodium: 134 mmol/L at 11/18/2024  7:46 AM  Total Bilirubin: 2.73 mg/dL at 11/17/2024  3:46 PM  Serum Albumin: 2.7 g/dL at 11/17/2024  3:46 PM  INR(ratio): 1.77 at 11/17/2024  4:21 AM  Age at listing (hypothetical): 69 years  Sex: Male at 11/18/2024  7:46 AM  Management of volume status and ascites is careful balance as patient is being treated for septic shock with pressors -would prioritize optimizing hemodynamics prior to initiating more aggressive pharmacologic management of ascitic fluid  Patient is scheduled for therapeutic IR paracentesis  today, continue albumin repletion  Alcohol cessation, low-salt diet, fluid restriction  Continue Bumex 1 mg twice daily, wait for further hemodynamic stability until incorporation of Aldactone  Agree with de-escalation of lactulose to 20 g daily as patient is having sufficient bowel movements  Check ammonia, consider adding rifaximin given fluctuating mental status  Monitor MELD labs    Hepatocellular carcinoma (HCC)  Managed by hematology/oncology at [facility] as recent as 10/1/2024  Plan was to proceed with TACE however no feasible access to vessels and the procedure was aborted  After receiving immunotherapy with tremelimumab/durvalumab, currently on hold in the setting of acute illness  Will need to inquire about timing of resuming immunotherapy and continuity of care with local oncologist  Shock (HCC)  Sepsis in the setting of lower extremity cellulitis and Klebsiella and +/- Enterococcus faecalis UTI, nosocomial SBP also suspected source  Maintained on cefepime 2 g every 8 hours, day #4  Blood Pressure: 96/54, requiring 7 mcg/min of Levophed  Continue antibiotics and pressors per primary team  Agree with holding Aldactone, continue Bumex 1 mg twice daily for management of ascites  Trend CBC and CMP  Monitor daily MELD labs    Diabetes (HCC)  Lab Results   Component Value Date    HGBA1C 6.7 (H) 10/23/2024       Recent Labs     11/17/24  2129 11/18/24  0727 11/18/24  0957 11/18/24  1152   POCGLU 386* 311* 233* 215*       Blood Sugar Average: Last 72 hrs:  (P) 255.7509042902443909  Continue insulin drip, management per primary team  ABILIO (acute kidney injury) (HCC)  Likely in the setting of shock state, see above for details and plan  Renal function showing signs of recovery, continue to optimize hemodynamics and monitor CMP and urine output  Pancytopenia (HCC)  Chronic, stable  Previous workups show etiology is a secondary of splenic sequestration  Recent Labs     11/16/24  0428 11/17/24 0424  11/18/24  0514   WBC 10.33* 6.71 8.74     Recent Labs     11/16/24  0428 11/17/24  0424 11/18/24  0514   PLT 81* 60* 54*     Recent Labs     11/16/24  0428 11/17/24  0424 11/18/24  0514   HGB 7.9* 7.4* 7.4*     Continue to monitor CBC and signs of bleeding  Hyponatremia  Recent Labs     11/17/24  0421 11/17/24  1546 11/18/24  0746   SODIUM 134* 135 134*   Stable, monitor CMP  UTI (urinary tract infection)  Cultures positive for greater than 100,000 CFU per milliliter Klebsiella pneumonia, 20-30,000 CFU per milliliter Enterococcus faecalis  On IV cefepime, day #4    Cellulitis of left lower extremity  Antibiotic management per primary team        VTE Pharmacologic Prophylaxis:   High Risk (Score >/= 5) - Pharmacological DVT Prophylaxis Ordered: heparin. Sequential Compression Devices Ordered.  Code Status: Level 3 - DNAR and DNI   Discussion with family: Updated  (daughter) at bedside.    Anticipated Length of Stay: Patient will be admitted on an inpatient basis with an anticipated length of stay of greater than 2 midnights secondary to cirrhosis, septic shock.    History of Present Illness   Chief Complaint: Left lower extremity tenderness and abdominal fullness    Isael Avendano is a 69 y.o. male with a PMH of cirrhosis secondary to alcoholic liver disease, recurrent ascites, hepatocellular carcinoma, pancytopenia, type 2 diabetes, who presents with lower extremity tenderness and abdominal fullness.  Gastroenterology is consulted for further management of decompensated cirrhosis.  Patient was initially seen in the emergency department on November 12 complaining of abdominal fullness.  He received bedside paracentesis where 5 L of ascitic fluid was pulled off.  He was recommended to stay for further inpatient management but he declined.  He was discharged home and recommended outpatient follow-up with his hepatologist.  Patient return to the emergency department 3 days later with similar complaint but now  meeting criteria for septic shock.  Diagnostic paracentesis was performed on his return to the emergency department which did not show bacteria and a total neutrophil count of 236.  He was admitted to the ICU for management of septic shock.  Levophed was started and home Aldactone was held.  Bumex was continued and continuous albumin was started for management of fluid overload.  Fortunately patient is alert and oriented.  There is growing concern for rapid reaccumulation of ascitic fluid in further management of his cirrhosis.      Review of Systems   Constitutional:  Positive for unexpected weight change. Negative for chills and fever.   HENT:  Negative for ear pain and sore throat.    Eyes:  Negative for pain and visual disturbance.   Respiratory:  Negative for cough and shortness of breath.    Cardiovascular:  Positive for leg swelling. Negative for chest pain and palpitations.   Gastrointestinal:  Positive for abdominal pain. Negative for constipation and vomiting.   Genitourinary:  Negative for dysuria and hematuria.   Musculoskeletal:  Negative for arthralgias and back pain.   Skin:  Negative for color change and rash.   Neurological:  Negative for seizures and syncope.   All other systems reviewed and are negative.      Historical Information   Past Medical History:   Diagnosis Date    ABILIO (acute kidney injury) (HCC) 12/25/2020    Arrhythmia     Chronic kidney disease     Colon polyp     COVID 12/2020    CPAP (continuous positive airway pressure) dependence     Diabetes mellitus (HCC)     History of echocardiogram 11/14/2017    showed EF of 50-55 percentWith moderate LVH and left ventricle diastolic dysfunction. Left atrium was moderately enlarged. Trace MR noted.     History of Holter monitoring 11/21/2017    showed baseline rhythm of sinus origin with an average heart it of 61 bpm. The lowest heart rate was 49 and the highest heart rate was 10 8 bpm. There were rare single VPCs, and frequent PACs  representing 3.2% of total beats. There were several episodes of sinus arrhythmias with sinus bradycardia and heart rate ranging from 40-90 bpm. No sustained dysrhythmias, or pauses noted. The patient did not    History of transfusion 2023    platelets    Hypertension     Liver disease     cirhosis    Morbid obesity (HCC) 2022    Murmur, cardiac     Obese     Obesity, morbid (HCC) 2022    Sleep apnea      Past Surgical History:   Procedure Laterality Date    CATARACT EXTRACTION      EYE SURGERY Left     FL GUIDED NEEDLE PLAC BX/ASP/INJ  2023    HERNIA REPAIR  [id number]    IR BIOPSY LIVER MASS  2024    IR PARACENTESIS  2024    IR PARACENTESIS  2024    JOINT REPLACEMENT Right     TKR    KNEE ARTHROPLASTY Right     AR ARTHROPLASTY GLENOHUMERAL JOINT TOTAL SHOULDER Left 2023    Procedure: ARTHROPLASTY SHOULDER REVERSE;  Surgeon: Marcelo Lester MD;  Location: [facility];  Service: Orthopedics    AR JARED SHOULDER ARTHRPLSTY HUMERAL&GLENOID COMPNT Left 2023    Procedure: removal of antibiotic spacer, incision and debridement,  with revision reverse shoulder arthroplasty;  Surgeon: Lita Aguilar;  Location: [facility];  Service: Orthopedics    AR JARED SHOULDER ARTHRPLSTY HUMERAL/GLENOID COMPNT Left 2023    Procedure: ARTHROPLASTY SHOULDER REVISION;  Surgeon: Lita Aguilar;  Location: [facility];  Service: Orthopedics    SHOULDER SURGERY Right     WOUND DEBRIDEMENT Left 2023    Procedure: INCISION AND DRAINAGE (I&D) EXTREMITY, vac placement;  Surgeon: Marcelo Lester MD;  Location: [facility];  Service: Orthopedics     Social History     Tobacco Use    Smoking status: Former     Current packs/day: 0.00     Average packs/day: 0.5 packs/day for 20.0 years (10.0 ttl pk-yrs)     Types: Cigarettes     Start date:      Quit date:      Years since quittin.9    Smokeless tobacco: Never   Vaping Use    Vaping status: Never Used    Substance and Sexual Activity    Alcohol use: Not Currently     Comment: quit 8-2022    Drug use: Never    Sexual activity: Not Currently     Partners: Female     E-Cigarette/Vaping    E-Cigarette Use Never User      E-Cigarette/Vaping Substances    Nicotine No     THC No     CBD No     Flavoring No     Other No     Unknown No        Social History:  Marital Status: /Civil Union   Occupation:   Patient Pre-hospital Living Situation: Home  Patient Pre-hospital Level of Mobility: walks  Patient Pre-hospital Diet Restrictions:     Meds/Allergies     Prior to Admission medications    Medication Sig Start Date End Date Taking? Authorizing Provider   bumetanide (BUMEX) 1 mg tablet Take 1 tablet (1 mg total) by mouth daily Do not start before February 29, 2024. 2/29/24   Louie Tobar MD   glimepiride (AMARYL) 4 mg tablet Take 4 mg by mouth 2 (two) times a day  Patient not taking: Reported on 9/23/2024 3/22/23   Historical Provider, MD   insulin glargine (LANTUS) 100 units/mL subcutaneous injection Inject 40 Units under the skin daily at bedtime  Patient not taking: Reported on 9/23/2024    Historical Provider, MD   Insulin Pen Needle (BD Pen Needle Nayla 2nd Gen) 32G X 4 MM MISC For use with insulin pen. Pharmacy may dispense brand covered by insurance. 5/19/22   Ino Ya MD   insulin regular (HumuLIN R,NovoLIN R) 100 units/mL injection Inject 10 Units under the skin 3 (three) times a day with meals  Patient not taking: Reported on 9/23/2024    Historical Provider, MD   lactulose (CHRONULAC) 10 g/15 mL solution Take 20 g by mouth 2 (two) times a day 6/21/24   Historical Provider, MD   MILK THISTLE PO Take 1 tablet by mouth 2 (two) times a day    Historical Provider, MD   Multiple Vitamin (multivitamin) capsule Take 1 capsule by mouth daily 1/3/21   Lorenzo JERONIMO MD   pantoprazole (PROTONIX) 40 mg tablet Take 1 tablet (40 mg total) by mouth daily 6/14/24 12/11/24  Misha Downey PA-C    potassium chloride (Klor-Con M20) 20 mEq tablet Take 1 tablet (20 mEq total) by mouth daily Do not start before February 29, 2024.  Patient not taking: Reported on 9/23/2024 2/29/24   Louie Tobar MD   spironolactone (ALDACTONE) 50 mg tablet Take 50 mg by mouth daily 8/23/24   Historical Provider, MD   tamsulosin (FLOMAX) 0.4 mg Take 1 capsule (0.4 mg total) by mouth daily with dinner 6/28/23   Navin Montelongo PA-C   traZODone (DESYREL) 50 mg tablet Take 50 mg by mouth daily at bedtime bedtime 11/11/22   Historical Provider, MD   fluticasone-salmeterol (ADVAIR HFA) 115-21 MCG/ACT inhaler Inhale 2 puffs 2 (two) times a day Rinse mouth after use.  Patient not taking: Reported on 7/15/2021  5/17/22  Historical Provider, MD   Glyxambi 25-5 MG TABS  10/22/20 5/17/22  Historical Provider, MD     Allergies   Allergen Reactions    Sulfa Antibiotics Hives    Daptomycin Other (See Comments)     Possibly contributed to ABILIO on 6/2023 admission        Objective :  Temp:  [97.5 °F (36.4 °C)-98.7 °F (37.1 °C)] 97.5 °F (36.4 °C)  HR:  [111-138] 126  BP: ()/(50-93) 97/62  Resp:  [12-45] 17  SpO2:  [97 %-100 %] 99 %  O2 Device: Nasal cannula    Physical Exam  Vitals and nursing note reviewed.   Constitutional:       Appearance: He is ill-appearing.      Comments: Patient drowsy appearing, has received pain meds recently, mildly encephalopathic   HENT:      Head: Normocephalic.      Mouth/Throat:      Mouth: Mucous membranes are moist.      Pharynx: Oropharynx is clear. No oropharyngeal exudate.   Eyes:      Extraocular Movements: Extraocular movements intact.      Pupils: Pupils are equal, round, and reactive to light.   Cardiovascular:      Rate and Rhythm: Tachycardia present.      Pulses: Normal pulses.      Heart sounds: Normal heart sounds.   Pulmonary:      Effort: Pulmonary effort is normal.      Breath sounds: Normal breath sounds.   Abdominal:      General: There is distension.      Tenderness: There is abdominal  tenderness.   Musculoskeletal:      Cervical back: Normal range of motion.      Right lower leg: Edema present.      Left lower leg: Edema present.      Comments: All 4 extremities are edematous   Neurological:      Comments: Mild asterixis present  Able to appropriately follow commands and respond to questions          Lines/Drains:  Lines/Drains/Airways       Active Status       Name Placement date Placement time Site Days    PICC Line 11/12/24 Right Brachial 11/12/24  1638  Brachial  5    Urethral Catheter Non-latex 14 Fr. 11/16/24  1630  Non-latex  1                  Urinary Catheter:  Goal for removal: Voiding trial when ambulation improves         Central Line:  Goal for removal: Will discontinue when meds requiring line are completed.             Lab Results: I have reviewed the following results:  Results from last 7 days   Lab Units 11/18/24  0514 11/17/24  0424 11/16/24  0428   WBC Thousand/uL 8.74   < > 10.33*   HEMOGLOBIN g/dL 7.4*   < > 7.9*   HEMATOCRIT % 25.1*   < > 25.6*   PLATELETS Thousands/uL 54*   < > 81*   SEGS PCT %  --   --  88*   LYMPHO PCT %  --   --  6*   MONO PCT %  --   --  5   EOS PCT %  --   --  1    < > = values in this interval not displayed.     Results from last 7 days   Lab Units 11/18/24  0746 11/17/24  1546   SODIUM mmol/L 134* 135   POTASSIUM mmol/L 4.6 4.2   CHLORIDE mmol/L 104 108   CO2 mmol/L 24 21   BUN mg/dL 34* 32*   CREATININE mg/dL 1.21 1.17   ANION GAP mmol/L 6 6   CALCIUM mg/dL 9.4 8.1*   ALBUMIN g/dL  --  2.7*   TOTAL BILIRUBIN mg/dL  --  2.73*   ALK PHOS U/L  --  236*   ALT U/L  --  25   AST U/L  --  31   GLUCOSE RANDOM mg/dL 332* 395*     Results from last 7 days   Lab Units 11/17/24  0421   INR  1.77*     Results from last 7 days   Lab Units 11/18/24  1152 11/18/24  0957 11/18/24  0727 11/17/24  2129 11/17/24  1625 11/17/24  1106 11/17/24  0755 11/16/24  2143 11/16/24  1614 11/16/24  1129 11/16/24  0756 11/16/24  0557   POC GLUCOSE mg/dl 215* 233* 311* 386* 406*  317* 208* 326* 247* 241* 172* 184*     Lab Results   Component Value Date    HGBA1C 6.7 (H) 10/23/2024    HGBA1C 8.5 (H) 08/16/2024    HGBA1C 9.9 (H) 05/17/2024     Results from last 7 days   Lab Units 11/16/24  0428 11/15/24  1808 11/15/24  0942 11/15/24  0737   LACTIC ACID mmol/L  --  1.9 3.0* 3.2*   PROCALCITONIN ng/ml 2.01*  --   --  3.19*             Administrative Statements       ** Please Note: This note has been constructed using a voice recognition system. **

## 2024-11-18 NOTE — ASSESSMENT & PLAN NOTE
Hx:HCC, prior history of heavy etoh use; Exam: abdominal distention  MELD 25    Assessment: Prior history of SBP, not on prophylaxis    Plan:  GI consult  Daily MELD labs  Continue PTA lactulose 20 g bid, adjust as needed for 3-4 loose BM per day  IR consulted for paracentesis vs bedside procedure

## 2024-11-18 NOTE — ASSESSMENT & PLAN NOTE
Pt baseline leukocytes is 2-3  WBCs on initial presentation 10.04, today 6.71 with neutrophilic predominance

## 2024-11-18 NOTE — ASSESSMENT & PLAN NOTE
Sepsis in the setting of lower extremity cellulitis and Klebsiella and +/- Enterococcus faecalis UTI, nosocomial SBP also suspected source  Maintained on cefepime 2 g every 8 hours, day #4  Blood Pressure: 96/54, requiring 7 mcg/min of Levophed  Continue antibiotics and pressors per primary team  Agree with holding Aldactone, continue Bumex 1 mg twice daily for management of ascites  Trend CBC and CMP  Monitor daily MELD labs

## 2024-11-18 NOTE — WOUND OSTOMY CARE
Consult Note - Wound   Isael Avendano 69 y.o. male MRN: [id number]  Unit/Bed#: ICU 01 Encounter: [id number]        History and Present Illness:  Patient is a 70 yo male that was admitted to [facility]  for treatment of shock. Patient has a PMH of decompensated cirrhosis, hepatocellular carcinoma, diabetes, CKD, HTN, . Patient is alert and oriented times four, drowsy but arousable; wife at bedside agreeable to assessment. Patient is assist of two for turning and repositioning, dependent for care, set up for meals; nutrition consulted and following; nutritional supplements ordered. Patient is incontinent of bowel and has indwelling catheter in place for urine management. On assessment, patient is in ICU, on critical care low air loss mattress, on ATR turn and reposition system with pillows and wedges in place for offloading, vasopressors infusing, insulin drip. Primary ICU nurse at bedside for assessment.     Wound Care was consulted for multiple wounds present on admission.     Assessment Findings:     POA Bilateral Sacrum Stage 3 Pressure Injury - Irregular shaped full thickness skin loss. Wound bed is mix of beefy red and yellow tissue. Moderate amount of bloody drainage. Jenn wound skin is  macerated and fragile. Suspect etiology of pressure, friction, moisture due to bowel incontinence, malnutrition, hypotension and swelling/weeping due to edema. Recommend stoma powder with triad paste.     POA Bilateral Groin, Perineum, Inner Thighs MASD - large irregular shaped areas of skin loss. Wound bed is mix of beefy red and pink tissue. Scant amount of serosanguinous drainage. Jenn wound skin is macerated, fragile with scaly pink rash consistent with fungal component. Suspect etiology of moisture due to bowel incontinence and swelling/weeping due to edema. Recommend MEIR anti fungal barrier cream.     POA Right Lateral Tibia - scattered irregular shaped areas of partial thickness skin loss. Wound beds are 100% pink fragile  tissue. Jenn wound is swollen, intact, fragile. Moderate amount of serous drainage noted. Etiology unclear. Recommend silver alginate with dry dressing and ACE wrap for compression.     POA Left Heel DTI - small oval intact area of 100% light purple non blanchable tissue. Jenn wound is dry and intact. No open aspects or drainage noted. Suspect etiology of pressure and friction. Recommend silicone foam dressing and True Jean offloading boot.   Right heel is dry, intact, slowly blanching. Monitor closely. Recommend silicone foam dressing and True Jean offloading boot.     No induration, fluctuance, odor, warmth/temperature differences, redness, or purulence noted to the above noted wounds and skin areas assessed. New dressings applied per orders listed below. Patient tolerated well- no s/s of non-verbal pain or discomfort observed during the encounter. Bedside nurse aware of plan of care. See flow sheets for more detailed assessment findings.      Orders listed below and wound care will continue to follow, call or Secure Chat with questions.     Skin care plans:  1-Cleanse sacro-buttocks with soap and water. Dust open wound beds with stoma powder and apply triad paste overtop TID and as needed with jenn care. For jenn-care use a pH-balanced wound cleanser to soften Triad, and gently wipe to remove without scrubbing. For jenn-care use a pH-balanced wound cleanser to soften Triad, and gently wipe to remove without scrubbing.  It is not necessary to remove all of the paste; only soiled aspects. Do not use Blue bath wipes for pericare.   2-Elevate heels to offload pressure. Apply True Jean/Prevalon boots to bilateral heels.  3-Ehob cushion in chair when out of bed, if able.  4-Moisturize skin daily with skin nourishing cream.  5-Turn/reposition q2h for pressure re-distribution on skin.  6 - Bilateral Heels: Apply Silicone foam dressing to heels, justo RIGHT w/P, Justo LEFT w/ T, peel foam check skin integrity q-shift. Change  q3d  7- R lower leg: Irrigate with NSS. Pat dry.  Cover wound bed with Silver Alginate and ABD. Wrap with Nida. ACE wrap from tip of toes to behind knees for compression. Change daily or PRN for soilage/dislodgement.  8-Wash Groin, Perineum and inner thighs with soap and water. Pat Dry. Apply Maryana antifungal cream BID.   9- P 500 low air loss mattress upon transfer out of ICU  10-Schedule Follow-up Outpatient Wound Appointment. Ambulatory Referral Placed.       Wounds:  Wound 11/15/24 Perineum (Active)   Wound Image   11/18/24 1014   Wound Description Beefy red;Bleeding;Fragile;Pink 11/18/24 1009   Jenn-wound Assessment Rash;Fragile;Maceration 11/18/24 1009   Wound Length (cm) 14 cm 11/18/24 1009   Wound Width (cm) 14 cm 11/18/24 1009   Wound Depth (cm) 0.2 cm 11/18/24 1009   Wound Surface Area (cm^2) 196 cm^2 11/18/24 1009   Wound Volume (cm^3) 39.2 cm^3 11/18/24 1009   Calculated Wound Volume (cm^3) 39.2 cm^3 11/18/24 1009   Drainage Amount Small 11/18/24 1009   Drainage Description Serosanguineous 11/18/24 1009   Non-staged Wound Description Partial thickness 11/18/24 1009   Treatments Cleansed;Site care 11/18/24 1009   Dressing Moisture barrier 11/18/24 1009   Wound packed? No 11/18/24 1009   Packing- # removed 0 11/18/24 1009   Packing- # inserted 0 11/18/24 1009   Patient Tolerance Tolerated well 11/18/24 1009   Wound 11/15/24 Tibial Posterior;Right (Active)   Wound Image   11/18/24 1013   Wound Description Fragile;Pink;Drainage 11/18/24 1013   Jenn-wound Assessment Edema;Swelling 11/18/24 1013   Wound Length (cm) 7 cm 11/18/24 1013   Wound Width (cm) 3 cm 11/18/24 1013   Wound Depth (cm) 0.2 cm 11/18/24 1013   Wound Surface Area (cm^2) 21 cm^2 11/18/24 1013   Wound Volume (cm^3) 4.2 cm^3 11/18/24 1013   Calculated Wound Volume (cm^3) 4.2 cm^3 11/18/24 1013   Drainage Amount Moderate 11/18/24 1013   Drainage Description Serous 11/18/24 1013   Non-staged Wound Description Partial thickness 11/18/24 1013    Treatments Cleansed;Site care 11/18/24 1013   Dressing Calcium Alginate with Silver;ABD;Dry dressing 11/18/24 1013   Packing- # removed 0 11/18/24 1013   Packing- # inserted 0 11/18/24 1013   Dressing Changed New 11/18/24 1013   Patient Tolerance Tolerated well 11/18/24 1013   Dressing Status Clean;Dry;Intact 11/18/24 1013       Wound 11/15/24 Pressure Injury Heel Left (Active)   Wound Image   11/18/24 1012   Wound Description Dry;Intact;Light purple;Non-blanchable erythema 11/18/24 1012   Pressure Injury Stage DTPI 11/18/24 1012   Jenn-wound Assessment Dry;Intact 11/18/24 1012   Wound Length (cm) 3 cm 11/18/24 1012   Wound Width (cm) 2.5 cm 11/18/24 1012   Wound Depth (cm) 0 cm 11/18/24 1012   Wound Surface Area (cm^2) 7.5 cm^2 11/18/24 1012   Wound Volume (cm^3) 0 cm^3 11/18/24 1012   Calculated Wound Volume (cm^3) 0 cm^3 11/18/24 1012   Drainage Amount None 11/18/24 1012   Non-staged Wound Description Not applicable 11/18/24 1012   Dressing Foam, Silicon (eg. Allevyn, etc) 11/18/24 1012   Wound packed? No 11/18/24 1012   Packing- # removed 0 11/18/24 1012   Packing- # inserted 0 11/18/24 1012   Dressing Changed New 11/18/24 1012   Patient Tolerance Tolerated well 11/18/24 1012   Dressing Status Clean;Dry;Intact 11/18/24 1012       Wound 11/18/24 Pressure Injury Sacrum (Active)   Wound Image   11/18/24 1007   Wound Description Beefy red;Fragile;Bleeding 11/18/24 1007   Pressure Injury Stage 3 11/18/24 1007   Jenn-wound Assessment Maceration;Fragile;Excoriated 11/18/24 1007   Wound Length (cm) 6 cm 11/18/24 1007   Wound Width (cm) 5.5 cm 11/18/24 1007   Wound Depth (cm) 0.3 cm 11/18/24 1007   Wound Surface Area (cm^2) 33 cm^2 11/18/24 1007   Wound Volume (cm^3) 9.9 cm^3 11/18/24 1007   Calculated Wound Volume (cm^3) 9.9 cm^3 11/18/24 1007   Drainage Amount Moderate 11/18/24 1007   Drainage Description Bloody 11/18/24 1007   Non-staged Wound Description Full thickness 11/18/24 1007   Treatments Cleansed;Site care  11/18/24 1007   Dressing Protective barrier;Moisture barrier 11/18/24 1007   Wound packed? No 11/18/24 1007   Packing- # removed 0 11/18/24 1007   Packing- # inserted 0 11/18/24 1007   Patient Tolerance Tolerated well 11/18/24 1007               Luiaz Thomas RN, BSN, CCRN

## 2024-11-18 NOTE — ASSESSMENT & PLAN NOTE
Recent Labs     11/17/24  0421 11/17/24  1546 11/18/24  0746   SODIUM 134* 135 134*   Stable, monitor CMP

## 2024-11-19 PROBLEM — E87.1 HYPONATREMIA: Status: RESOLVED | Noted: 2024-11-15 | Resolved: 2024-11-19

## 2024-11-19 PROBLEM — N17.9 AKI (ACUTE KIDNEY INJURY) (HCC): Status: RESOLVED | Noted: 2020-12-25 | Resolved: 2024-11-19

## 2024-11-19 PROBLEM — I85.00 VARICES, ESOPHAGEAL (HCC): Status: ACTIVE | Noted: 2024-11-19

## 2024-11-19 NOTE — PLAN OF CARE
Problem: Potential for Falls  Goal: Patient will remain free of falls  Description: INTERVENTIONS:  - Educate patient/family on patient safety including physical limitations  - Instruct patient to call for assistance with activity   - Consult OT/PT to assist with strengthening/mobility   - Keep Call bell within reach  - Keep bed low and locked with side rails adjusted as appropriate  - Keep care items and personal belongings within reach  - Initiate and maintain comfort rounds  - Make Fall Risk Sign visible to staff  - Offer Toileting every 2 Hours, in advance of need  - Initiate/Maintain bed alarm  - Obtain necessary fall risk management equipment  - Apply yellow socks and bracelet for high fall risk patients  - Consider moving patient to room near nurses station  Outcome: Progressing     Problem: Prexisting or High Potential for Compromised Skin Integrity  Goal: Skin integrity is maintained or improved  Description: INTERVENTIONS:  - Identify patients at risk for skin breakdown  - Assess and monitor skin integrity  - Assess and monitor nutrition and hydration status  - Monitor labs   - Assess for incontinence   - Turn and reposition patient  - Assist with mobility/ambulation  - Relieve pressure over bony prominences  - Avoid friction and shearing  - Provide appropriate hygiene as needed including keeping skin clean and dry  - Evaluate need for skin moisturizer/barrier cream  - Collaborate with interdisciplinary team   - Patient/family teaching  - Consider wound care consult   Outcome: Progressing     Problem: PAIN - ADULT  Goal: Verbalizes/displays adequate comfort level or baseline comfort level  Description: Interventions:  - Encourage patient to monitor pain and request assistance  - Assess pain using appropriate pain scale  - Administer analgesics based on type and severity of pain and evaluate response  - Implement non-pharmacological measures as appropriate and evaluate response  - Consider cultural and  social influences on pain and pain management  - Notify physician/advanced practitioner if interventions unsuccessful or patient reports new pain  Outcome: Progressing     Problem: INFECTION - ADULT  Goal: Absence or prevention of progression during hospitalization  Description: INTERVENTIONS:  - Assess and monitor for signs and symptoms of infection  - Monitor lab/diagnostic results  - Monitor all insertion sites, i.e. indwelling lines, tubes, and drains  - Monitor endotracheal if appropriate and nasal secretions for changes in amount and color  - [place] appropriate cooling/warming therapies per order  - Administer medications as ordered  - Instruct and encourage patient and family to use good hand hygiene technique  - Identify and instruct in appropriate isolation precautions for identified infection/condition  Outcome: Progressing  Goal: Absence of fever/infection during neutropenic period  Description: INTERVENTIONS:  - Monitor WBC    Outcome: Progressing     Problem: SAFETY ADULT  Goal: Patient will remain free of falls  Description: INTERVENTIONS:  - Educate patient/family on patient safety including physical limitations  - Instruct patient to call for assistance with activity   - Consult OT/PT to assist with strengthening/mobility   - Keep Call bell within reach  - Keep bed low and locked with side rails adjusted as appropriate  - Keep care items and personal belongings within reach  - Initiate and maintain comfort rounds  - Make Fall Risk Sign visible to staff  - Offer Toileting every 2 Hours, in advance of need  - Initiate/Maintain bed alarm  - Obtain necessary fall risk management equipment  - Apply yellow socks and bracelet for high fall risk patients  - Consider moving patient to room near nurses station  Outcome: Progressing  Goal: Maintain or return to baseline ADL function  Description: INTERVENTIONS:  -  Assess patient's ability to carry out ADLs; assess patient's baseline for ADL function and  identify physical deficits which impact ability to perform ADLs (bathing, care of mouth/teeth, toileting, grooming, dressing, etc.)  - Assess/evaluate cause of self-care deficits   - Assess range of motion  - Assess patient's mobility; develop plan if impaired  - Assess patient's need for assistive devices and provide as appropriate  - Encourage maximum independence but intervene and supervise when necessary  - Involve family in performance of ADLs  - Assess for home care needs following discharge   - Consider OT consult to assist with ADL evaluation and planning for discharge  - Provide patient education as appropriate  Outcome: Progressing  Goal: Maintains/Returns to pre admission functional level  Description: INTERVENTIONS:  - Perform AM-PAC 6 Click Basic Mobility/ Daily Activity assessment daily.  - Set and communicate daily mobility goal to care team and patient/family/caregiver.   - Collaborate with rehabilitation services on mobility goals if consulted  - Perform Range of Motion 3 times a day.  - Reposition patient every 2 hours.  - Dangle patient 3 times a day  - Stand patient 3 times a day  - Ambulate patient 3 times a day  - Out of bed to chair 3 times a day   - Out of bed for meals 3 times a day  - Out of bed for toileting  - Record patient progress and toleration of activity level   Outcome: Progressing     Problem: DISCHARGE PLANNING  Goal: Discharge to home or other facility with appropriate resources  Description: INTERVENTIONS:  - Identify barriers to discharge w/patient and caregiver  - Arrange for needed discharge resources and transportation as appropriate  - Identify discharge learning needs (meds, wound care, etc.)  - Arrange for interpretive services to assist at discharge as needed  - Refer to Case Management Department for coordinating discharge planning if the patient needs post-hospital services based on physician/advanced practitioner order or complex needs related to functional status,  cognitive ability, or social support system  Outcome: Progressing     Problem: Knowledge Deficit  Goal: Patient/family/caregiver demonstrates understanding of disease process, treatment plan, medications, and discharge instructions  Description: Complete learning assessment and assess knowledge base.  Interventions:  - Provide teaching at level of understanding  - Provide teaching via preferred learning methods  Outcome: Progressing     Problem: Nutrition/Hydration-ADULT  Goal: Nutrient/Hydration intake appropriate for improving, restoring or maintaining nutritional needs  Description: Monitor and assess patient's nutrition/hydration status for malnutrition. Collaborate with interdisciplinary team and initiate plan and interventions as ordered.  Monitor patient's weight and dietary intake as ordered or per policy. Utilize nutrition screening tool and intervene as necessary. Determine patient's food preferences and provide high-protein, high-caloric foods as appropriate.     INTERVENTIONS:  - Monitor oral intake, urinary output, labs, and treatment plans  - Assess nutrition and hydration status and recommend course of action  - Evaluate amount of meals eaten  - Assist patient with eating if necessary   - Allow adequate time for meals  - Recommend/ encourage appropriate diets, oral nutritional supplements, and vitamin/mineral supplements  - Order, calculate, and assess calorie counts as needed  - Recommend, monitor, and adjust tube feedings and TPN/PPN based on assessed needs  - Assess need for intravenous fluids  - Provide specific nutrition/hydration education as appropriate  - Include patient/family/caregiver in decisions related to nutrition  Outcome: Progressing

## 2024-11-19 NOTE — ASSESSMENT & PLAN NOTE
Suspect prerenal secondary to shock state, possible component of hepatorenal  Cr down-trending today from 1.67 to 1.52 (baseline 0.7-1.0)  ABILIO resolved today with creatinine at 1.03    Plan:  Continue bumex 1 mg BID  Albumin 25% 12.5 g Q6H  Hold home spironolactone   Monitor UOP, electrolytes, renal indices and adjust regimen accordingly

## 2024-11-19 NOTE — PLAN OF CARE
Problem: Potential for Falls  Goal: Patient will remain free of falls  Description: INTERVENTIONS:  - Educate patient/family on patient safety including physical limitations  - Instruct patient to call for assistance with activity   - Consult OT/PT to assist with strengthening/mobility   - Keep Call bell within reach  - Keep bed low and locked with side rails adjusted as appropriate  - Keep care items and personal belongings within reach  - Initiate and maintain comfort rounds  - Make Fall Risk Sign visible to staff  Problem: PAIN - ADULT  Goal: Verbalizes/displays adequate comfort level or baseline comfort level  Description: Interventions:  - Encourage patient to monitor pain and request assistance  - Assess pain using appropriate pain scale  - Administer analgesics based on type and severity of pain and evaluate response  - Implement non-pharmacological measures as appropriate and evaluate response  - Consider cultural and social influences on pain and pain management  - Notify physician/advanced practitioner if interventions unsuccessful or patient reports new pain  Outcome: Progressing     - Apply yellow socks and bracelet for high fall risk patients  - Consider moving patient to room near nurses station  Outcome: Progressing      Report taken from PACU, writer gave report to RN taking over.

## 2024-11-19 NOTE — ASSESSMENT & PLAN NOTE
Cultures positive for greater than 100,000 CFU per milliliter Klebsiella pneumonia, 20-30,000 CFU per milliliter Enterococcus faecalis  On IV cefepime, day #5

## 2024-11-19 NOTE — ASSESSMENT & PLAN NOTE
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

## 2024-11-19 NOTE — ASSESSMENT & PLAN NOTE
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

## 2024-11-19 NOTE — ASSESSMENT & PLAN NOTE
Managed by hematology/oncology at [facility] as recent as 10/1/2024  Plan was to proceed with TACE however no feasible access to vessels and the procedure was aborted  After receiving immunotherapy with tremelimumab/durvalumab, currently on hold in the setting of acute illness  Viability of liver transplant to be determined  Will need to inquire about timing of resuming immunotherapy and continuity of care with local oncologist

## 2024-11-19 NOTE — ASSESSMENT & PLAN NOTE
HCC, prior history of heavy etoh use. MELD 25  Abdominal distention, tense until yesterday. Since paracentesis on 11/18, soft and mildly distended  Prior history of SBP, not on prophylaxis    Plan:  Follow-up GI consult  Daily MELD labs  Continue PTA lactulose 20 g daily, adjust as needed

## 2024-11-19 NOTE — ASSESSMENT & PLAN NOTE
Lab Results   Component Value Date    HGBA1C 6.7 (H) 10/23/2024       Recent Labs     11/19/24  0200 11/19/24  0428 11/19/24  0611 11/19/24  0726   POCGLU 112 88 91 92       Blood Sugar Average: Last 72 hrs:  (P) 212.8350351520875080    Assessment:   BG elevated in the 300s  Started on insulin drip    Plan:  Mealtime insulin sliding scale  Consider transitioning to basal bolus insulin from insulin drip

## 2024-11-19 NOTE — ASSESSMENT & PLAN NOTE
Sepsis in the setting of lower extremity cellulitis and Klebsiella and +/- Enterococcus faecalis UTI, nosocomial SBP also suspected source  Maintained on cefepime 2 g every 8 hours, day #4  Blood Pressure: 112/53, requiring 5 mcg/min of Levophed  Continue antibiotics and pressors per primary team  Agree with holding Aldactone, continue Bumex 1 mg twice daily for management of ascites  Trend CBC and CMP  Monitor daily MELD labs

## 2024-11-19 NOTE — ASSESSMENT & PLAN NOTE
Lab Results   Component Value Date    EGFR 76 11/19/2024    EGFR 72 11/19/2024    EGFR 63 11/18/2024    CREATININE 0.99 11/19/2024    CREATININE 1.03 11/19/2024    CREATININE 1.16 11/18/2024

## 2024-11-19 NOTE — ASSESSMENT & PLAN NOTE
Malnutrition Findings:   Adult Malnutrition type: Acute illness  Adult Degree of Malnutrition: Malnutrition of moderate degree  Malnutrition Characteristics: Inadequate energy, Other (comment) (edema, ascited, skin breakdown)                  360 Statement: Malnutrition of moderate degree in the context of chronic illness as evidenced by edema, ascites, inadequate energy intake, skin breadown r/t liver disease. Treated with oral diet and will supplement with high kcal and protein supplement.    BMI Findings:           Body mass index is 31.64 kg/m².

## 2024-11-19 NOTE — ASSESSMENT & PLAN NOTE
Lab Results   Component Value Date    HGBA1C 6.7 (H) 10/23/2024       Recent Labs     11/19/24  0428 11/19/24  0611 11/19/24  0726 11/19/24  0952   POCGLU 88 91 92 171*       Blood Sugar Average: Last 72 hrs:  (P) 211.6219562822278270    On insulin gtt, management per primary team

## 2024-11-19 NOTE — ASSESSMENT & PLAN NOTE
Lab Results   Component Value Date    EGFR 72 11/19/2024    EGFR 63 11/18/2024    EGFR 59 11/18/2024    CREATININE 1.03 11/19/2024    CREATININE 1.16 11/18/2024    CREATININE 1.21 11/18/2024

## 2024-11-19 NOTE — ASSESSMENT & PLAN NOTE
Malnutrition Findings:   Adult Malnutrition type: Acute illness  Adult Degree of Malnutrition: Malnutrition of moderate degree  Malnutrition Characteristics: Inadequate energy, Other (comment) (edema, ascited, skin breakdown)                  360 Statement: Malnutrition of moderate degree in the context of chronic illness as evidenced by edema, ascites, inadequate energy intake, skin breadown r/t liver disease. Treated with oral diet and will supplement with high kcal and protein supplement.    BMI Findings:           Body mass index is 32.29 kg/m².

## 2024-11-19 NOTE — PROGRESS NOTES
Progress Note - Critical Care/ICU   Name: Isael Avendano 69 y.o. male I MRN: [id number]  Unit/Bed#: ICU 01 I Date of Admission: 11/15/2024   Date of Service: 11/19/2024 I Hospital Day: 4      Assessment & Plan  Shock (HCC)  Significant for decompensated cirrhosis, ?SBP, cellulitis  Lactic acid initially 3.0, normalized to 1.9 on 11/15  Bilateral lower extremity 2+ pitting edema, with more tenderness and erythema in the LLE, though warmth is similar bilaterally   WBCs 10.04 on admission from baseline of 2-3, 7.64 today with neutrophilic predominance  CXR - increased interstitial markings. Ucx - > 100k klebsiella, 20-29k enterococcus, Bcx no growth, peritoneal fluid culture - no growth  Hypotensive to 83/48 with MAP of 58, started on pressors  Suspect hypovolemic shock 2/2 sepsis and decompensated cirrhosis causing third-spacing and intravascular volume depletion despite total body volume up. Treated with ampicillin until night prior to presentation  Bedside paracentesis performed yesterday, with 4L output. Yellow and turbid. Sent out for white count and cx  MAP goal changed to 60 yesterday, levophed down to 2 mcg/min      Plan:  Cefepime 2 g q8 for suspected nosocomial SBP, LLE cellulitis, and UTI  Continue albumin 12.5 g 25% q6h  Cultures pending, f/u  Wean pressors as able, currently on Levo 2 mcg/min via PICC  Decompensated cirrhosis (HCC)  HCC, prior history of heavy etoh use. MELD 25  Abdominal distention, tense until yesterday. Since paracentesis on 11/18, soft and mildly distended  Prior history of SBP, not on prophylaxis    Plan:  Follow-up GI consult  Daily MELD labs  Continue PTA lactulose 20 g daily, adjust as needed  Diabetes (HCC)  Lab Results   Component Value Date    HGBA1C 6.7 (H) 10/23/2024       Recent Labs     11/19/24  0200 11/19/24  0428 11/19/24  0611 11/19/24  0726   POCGLU 112 88 91 92       Blood Sugar Average: Last 72 hrs:  (P) 212.9552842689296558    Assessment:   BG elevated in the  300s  Started on insulin drip    Plan:  Mealtime insulin sliding scale  Consider transitioning to basal bolus insulin from insulin drip  Hyponatremia (Resolved: 11/19/2024)  Na: 130, 131 corrected on admission  Na improved to 140 today  Suspect hypovolemic hyponatremia secondary to decompensated cirrhosis with ascites and volume overload    Plan  Monitor serum sodium on am BMP  Rest of plan as above  ABILIO (acute kidney injury) (HCC) (Resolved: 11/19/2024)  Suspect prerenal secondary to shock state, possible component of hepatorenal  Cr down-trending today from 1.67 to 1.52 (baseline 0.7-1.0)  ABILIO resolved today with creatinine at 1.03    Plan:  Continue bumex 1 mg BID  Albumin 25% 12.5 g Q6H  Hold home spironolactone   Monitor UOP, electrolytes, renal indices and adjust regimen accordingly  Pancytopenia (HCC)  Chronic, stable  Pt presented with a leukocytosis of 10 (baseline of 2-3), 7.64 today with neutrophilic predominance  Platelets stable at 52  Worked up previously, secondary to hypersplenism.  SBP (spontaneous bacterial peritonitis) (HCC)  As noted under shock  Cellulitis of left lower extremity  As noted under shock  Moderate protein-calorie malnutrition (HCC)  Malnutrition Findings:   Adult Malnutrition type: Acute illness  Adult Degree of Malnutrition: Malnutrition of moderate degree  Malnutrition Characteristics: Inadequate energy, Other (comment) (edema, ascited, skin breakdown)                  360 Statement: Malnutrition of moderate degree in the context of chronic illness as evidenced by edema, ascites, inadequate energy intake, skin breadown r/t liver disease. Treated with oral diet and will supplement with high kcal and protein supplement.    BMI Findings:           Body mass index is 31.64 kg/m².     Stage 3a chronic kidney disease (HCC)  Lab Results   Component Value Date    EGFR 72 11/19/2024    EGFR 63 11/18/2024    EGFR 59 11/18/2024    CREATININE 1.03 11/19/2024    CREATININE 1.16 11/18/2024     CREATININE 1.21 11/18/2024     Hepatocellular carcinoma (HCC)  PICC line in place, receiving immunotherapy  UTI (urinary tract infection)  Urine cx positive for >100k klebsiella and 20-29k enterococcus faecalis    Plan:  Continue cefepime 2g q8h  Disposition: Critical care    ICU Core Measures     A: Assess, Prevent, and Manage Pain Has pain been assessed? Yes  Need for changes to pain regimen? No   B: Both SAT/SAT  N/A   C: Choice of Sedation RASS Goal: -1 Drowsy  Need for changes to sedation or analgesia regimen? No   D: Delirium CAM-ICU: Negative   E: Early Mobility  Plan for early mobility? Yes   F: Family Engagement Plan for family engagement today? Yes       Antibiotic Review: Patient on appropriate coverage based on culture data. , Awaiting culture results. , and Continue broad spectrum secondary to severity of illness.     Review of Invasive Devices:    Tejeda Plan: Tejeda placed by urology. Removal plans per their team  Central access plan: Patient requires PICC secondary to immunotherapy      Prophylaxis:  VTE VTE covered by:  heparin (porcine), Subcutaneous, 5,000 Units at 11/19/24 0637       Stress Ulcer  not orderedcovered bypantoprazole (PROTONIX) 40 mg tablet [[id number]] (Long-Term Med)         24 Hour Events : no events  Subjective   Pt moans/vocalizes frequently, which is his baseline per his wife. He continues to complain of LLE pain, though notes his abdominal pain has improved.    Review of Systems: Review of Systems   Constitutional:  Positive for chills. Negative for fever.   Respiratory:  Negative for cough, shortness of breath and wheezing.    Cardiovascular:  Positive for leg swelling. Negative for chest pain.   Gastrointestinal:  Positive for abdominal pain (reduced).   Skin:  Positive for wound (bottom of L foot).   Neurological:  Negative for weakness and numbness.       Objective :                   Vitals I/O      Most Recent Min/Max in 24hrs   Temp 99 °F (37.2 °C) Temp  Min: 97.4 °F  (36.3 °C)  Max: 99 °F (37.2 °C)   Pulse (!) 119 Pulse  Min: 108  Max: 131   Resp (!) 25 Resp  Min: 12  Max: 61   BP (!) 75/42 BP  Min: 75/42  Max: 170/74   O2 Sat 96 % SpO2  Min: 93 %  Max: 100 %      Intake/Output Summary (Last 24 hours) at 2024 0816  Last data filed at 2024 0607  Gross per 24 hour   Intake 971.8 ml   Output 1125 ml   Net -153.2 ml       Diet Alan/CHO Controlled; Consistent Carbohydrate Diet Level 2 (5 carb servings/75 grams CHO/meal); Fluid Restriction 1800 ML, Sodium 2 GM    Invasive Monitoring           Physical Exam   Physical Exam  Vitals reviewed.   Eyes:      General: No scleral icterus.     Conjunctiva/sclera: Conjunctivae normal.   Skin:     General: Skin is warm and dry.      Findings: Erythema (LLE, decreased compared to yesterday) and wound (bottom of L foot, chronic) present.   HENT:      Head: Normocephalic and atraumatic.      Mouth/Throat:      Mouth: Mucous membranes are dry.   Cardiovascular:      Rate and Rhythm: Regular rhythm. Tachycardia present.      Heart sounds: Normal heart sounds.   Musculoskeletal:      Right lower le+ Edema present.      Left lower le+ Edema present.   Abdominal:      Palpations: Abdomen is soft.      Tenderness: There is abdominal tenderness (mild).   Constitutional:       Appearance: He is ill-appearing.   Pulmonary:      Effort: Pulmonary effort is normal. No accessory muscle usage, respiratory distress or accessory muscle usage.      Breath sounds: No wheezing or rales.   Psychiatric:         Speech: Speech is not no expressive aphasia.         Behavior: Behavior is cooperative.   Neurological:      Mental Status: He is easily aroused. He is lethargic and somnolent.      Cranial Nerves: No facial asymmetry.   Genitourinary/Anorectal:  Tejeda present.        Diagnostic Studies        Lab Results: I have reviewed the following results:     Medications:  Scheduled PRN   Albumin 25%, 12.5 g, Q6H  bumetanide, 1 mg, BID  cefepime, 2,000  mg, Q8H  chlorhexidine, 15 mL, Q12H LEEANNE  heparin (porcine), 5,000 Units, Q8H LEEANNE  lactulose, 20 g, Daily  MEIR ANTIFUNGAL, , BID  potassium phosphate, 21 mmol, Once      oxyCODONE, 5 mg, Q6H PRN       Continuous    insulin regular (HumuLIN R,NovoLIN R) 1 Units/mL in sodium chloride 0.9 % 100 mL infusion, 0.3-21 Units/hr, Last Rate: Stopped (11/19/24 0438)  norepinephrine, 1-30 mcg/min, Last Rate: 5 mcg/min (11/19/24 0747)         Labs:   CBC    Recent Labs     11/18/24  0514 11/19/24  0430   WBC 8.74 7.64   HGB 7.4* 7.3*   HCT 25.1* 23.7*   PLT 54* 52*     BMP    Recent Labs     11/18/24  1738 11/19/24  0430   SODIUM 137 140   K 4.3 4.1    110*   CO2 24 24   AGAP 5 6   BUN 36* 36*   CREATININE 1.16 1.03   CALCIUM 9.2 9.5       Coags    Recent Labs     11/19/24  0430   INR 2.10*        Additional Electrolytes  Recent Labs     11/18/24  0514 11/19/24  0430   MG 2.5 2.1   PHOS 2.5 1.9*   CAIONIZED 1.12 1.26          Blood Gas    No recent results  No recent results LFTs  Recent Labs     11/17/24  1546 11/19/24  0430   ALT 25 22   AST 31 28   ALKPHOS 236* 173*   ALB 2.7* 2.8*   TBILI 2.73* 2.93*       Infectious  No recent results  Glucose  Recent Labs     11/17/24  1546 11/18/24  0746 11/18/24 1738 11/19/24  0430   GLUC 395* 332* 237* 92

## 2024-11-19 NOTE — ASSESSMENT & PLAN NOTE
Significant for decompensated cirrhosis, ?SBP, cellulitis  Lactic acid initially 3.0, normalized to 1.9 on 11/15  Bilateral lower extremity 2+ pitting edema, with more tenderness and erythema in the LLE, though warmth is similar bilaterally   WBCs 10.04 on admission from baseline of 2-3, 7.64 today with neutrophilic predominance  CXR - increased interstitial markings. Ucx - > 100k klebsiella, 20-29k enterococcus, Bcx no growth, peritoneal fluid culture - no growth  Hypotensive to 83/48 with MAP of 58, started on pressors  Suspect hypovolemic shock 2/2 sepsis and decompensated cirrhosis causing third-spacing and intravascular volume depletion despite total body volume up. Treated with ampicillin until night prior to presentation  Bedside paracentesis performed yesterday, with 4L output. Yellow and turbid. Sent out for white count and cx  MAP goal changed to 60 yesterday, levophed down to 2 mcg/min      Plan:  Cefepime 2 g q8 for suspected nosocomial SBP, LLE cellulitis, and UTI  Continue albumin 12.5 g 25% q6h  Cultures pending, f/u  Wean pressors as able, currently on Levo 2 mcg/min via PICC

## 2024-11-19 NOTE — ASSESSMENT & PLAN NOTE
Urine cx positive for >100k klebsiella and 20-29k enterococcus faecalis    Plan:  Continue cefepime 2g q8h

## 2024-11-19 NOTE — ASSESSMENT & PLAN NOTE
AUTHORIZATION FOR ADMINISTRATION OF MEDICATION AT SCHOOL      Student:  Jasson Bailey    YOB: 2020    I have prescribed the following medication for this child and request that it be administered by day care personnel or by the school nurse while the child is at day care or school.    Medication:      Medical Condition Medication Strength  Mg/ml Dose  # tablets Time(s)  Frequency Route start date stop date   Food Allergy Epinephrine auto-injector 0.15mg 0.15mg As directed per anaphylaxis action plan IM 22   Food Allergy Cetirizine 1mg/mL 5mg As directed per anaphylaxis action plan Oral 22               All authorizations  at the end of the school year or at the end of   Extended School Year summer school programs                                                              Parent / Guardian Authorization    I request that the above mediation(s) be given during school hours as ordered by this student s physician/licensed prescriber.    I also request that the medication(s) be given on field trips, as prescribed.     I release school personnel from liability in the event adverse reactions result from taking medication(s).    I will notify the school of any change in the medication(s), (ex: dosage change, medication is discontinued, etc.)    I give permission for the school nurse or designee to communicate with the student s teachers about the student s health condition(s) being treated by the medication(s), as well as ongoing data on medication effects provided to physician / licensed prescriber and parent / legal guardian via monitoring form.      ___________________________________________________           __________________________  Parent/Guardian Signature                                                                  Relationship to Student    Parent Phone: [phone] (home)                                                                          PICC line in place, receiving immunotherapy   Today s Date: August 30, 2022    NOTE: Medication is to be supplied in the original/prescription bottle.  Signatures must be completed in order to administer medication. If medication policy is not followed, school health services will not be able to administer medication, which may adversely affect educational outcomes or this student s safety.      Electronically Signed By Luisito Samuels MD   Provider:                                                                                             Date: August 30, 2022

## 2024-11-19 NOTE — ASSESSMENT & PLAN NOTE
Na: 130, 131 corrected on admission  Na improved to 140 today  Suspect hypovolemic hyponatremia secondary to decompensated cirrhosis with ascites and volume overload    Plan  Monitor serum sodium on am BMP  Rest of plan as above

## 2024-11-19 NOTE — ASSESSMENT & PLAN NOTE
Cirrhosis secondary to alcohol abuse, decompensation likely from septic shock  Patient seen in the emergency department on November 12 recurrent ascites where paracentesis was performed and he was discharged home -5 L of fluid removed at that time.  Patient declined admission during that visit.  Return to the emergency department 3 days later with lower extremity swelling found to have cellulitis and septic shock -paracentesis performed on admission  Fluid analysis from November 15 show ascitic fluid neutrophils of 239, no bacteria growth  Fluid now reaccumulating in the abdomen causing discomfort, physical exam shows mildly tender and largely distended abdomen with positive fluid wave shift, patient is AAO x 4   MELD 3.0: 19 at 11/19/2024  7:47 AM  MELD-Na: 19 at 11/19/2024  7:47 AM  Calculated from:  Serum Creatinine: 0.99 mg/dL (Using min of 1 mg/dL) at 11/19/2024  7:47 AM  Serum Sodium: 141 mmol/L (Using max of 137 mmol/L) at 11/19/2024  7:47 AM  Total Bilirubin: 2.93 mg/dL at 11/19/2024  4:30 AM  Serum Albumin: 2.8 g/dL at 11/19/2024  4:30 AM  INR(ratio): 2.1 at 11/19/2024  4:30 AM  Age at listing (hypothetical): 69 years  Sex: Male at 11/19/2024  7:47 AM  S/p paracentesis 11/18 - 4L fluid removed, fluid analysis does not meet criteria for SBP.  Abdominal exam improved today. Patient more alert and in less discomfort.  Management of volume status and ascites is careful balance as patient is being treated for septic shock with pressors -would prioritize optimizing hemodynamics prior to initiating more aggressive pharmacologic management of ascitic fluid  Continue albumin repletion, especially with paracentesis > 5L  Alcohol cessation, low-salt diet, fluid restriction  Continue Bumex 1 mg twice daily, wait for further hemodynamic stability until incorporation of Aldactone  Agree with de-escalation of lactulose to 20 g daily as patient is having sufficient bowel movements  Monitor MELD labs

## 2024-11-19 NOTE — PROGRESS NOTES
Progress Note - Gastroenterology   Name: Isael Avendano 69 y.o. male I MRN: [id number]  Unit/Bed#: ICU 01 I Date of Admission: 11/15/2024   Date of Service: 11/19/2024 I Hospital Day: 4    Assessment & Plan  Decompensated cirrhosis (HCC)  Cirrhosis secondary to alcohol abuse, decompensation likely from septic shock  Patient seen in the emergency department on November 12 recurrent ascites where paracentesis was performed and he was discharged home -5 L of fluid removed at that time.  Patient declined admission during that visit.  Return to the emergency department 3 days later with lower extremity swelling found to have cellulitis and septic shock -paracentesis performed on admission  Fluid analysis from November 15 show ascitic fluid neutrophils of 239, no bacteria growth  Fluid now reaccumulating in the abdomen causing discomfort, physical exam shows mildly tender and largely distended abdomen with positive fluid wave shift, patient is AAO x 4   MELD 3.0: 19 at 11/19/2024  7:47 AM  MELD-Na: 19 at 11/19/2024  7:47 AM  Calculated from:  Serum Creatinine: 0.99 mg/dL (Using min of 1 mg/dL) at 11/19/2024  7:47 AM  Serum Sodium: 141 mmol/L (Using max of 137 mmol/L) at 11/19/2024  7:47 AM  Total Bilirubin: 2.93 mg/dL at 11/19/2024  4:30 AM  Serum Albumin: 2.8 g/dL at 11/19/2024  4:30 AM  INR(ratio): 2.1 at 11/19/2024  4:30 AM  Age at listing (hypothetical): 69 years  Sex: Male at 11/19/2024  7:47 AM  S/p paracentesis 11/18 - 4L fluid removed, fluid analysis does not meet criteria for SBP.  Abdominal exam improved today. Patient more alert and in less discomfort.  Management of volume status and ascites is careful balance as patient is being treated for septic shock with pressors -would prioritize optimizing hemodynamics prior to initiating more aggressive pharmacologic management of ascitic fluid  Continue albumin repletion, especially with paracentesis > 5L  Alcohol cessation, low-salt diet, fluid restriction  Continue  Bumex 1 mg twice daily, wait for further hemodynamic stability until incorporation of Aldactone  Agree with de-escalation of lactulose to 20 g daily as patient is having sufficient bowel movements  Monitor MELD labs  Hepatocellular carcinoma (HCC)  Managed by hematology/oncology at [facility] as recent as 10/1/2024  Plan was to proceed with TACE however no feasible access to vessels and the procedure was aborted  After receiving immunotherapy with tremelimumab/durvalumab, currently on hold in the setting of acute illness  Viability of liver transplant to be determined  Will need to inquire about timing of resuming immunotherapy and continuity of care with local oncologist  Pancytopenia (HCC)  Chronic, stable  Previous workups show etiology is a secondary of splenic sequestration  Recent Labs     11/17/24 0424 11/18/24  0514 11/19/24  0430   WBC 6.71 8.74 7.64     Recent Labs     11/17/24 0424 11/18/24  0514 11/19/24  0430   PLT 60* 54* 52*     Recent Labs     11/17/24 0424 11/18/24  0514 11/19/24  0430   HGB 7.4* 7.4* 7.3*     Continue to monitor CBC and signs of bleeding  Varices, esophageal (HCC)  EGD on 8/15 shows two tortuous varices in the upper third of the esophagus  Currently stable and without evidence of bleeding  Trend CBC and monitor s/s of variceal bleeding  No beta blocker at this time in the setting of septic shock  Shock (HCC)  Sepsis in the setting of lower extremity cellulitis and Klebsiella and +/- Enterococcus faecalis UTI, nosocomial SBP also suspected source  Maintained on cefepime 2 g every 8 hours, day #4  Blood Pressure: 112/53, requiring 5 mcg/min of Levophed  Continue antibiotics and pressors per primary team  Agree with holding Aldactone, continue Bumex 1 mg twice daily for management of ascites  Trend CBC and CMP  Monitor daily MELD labs  Diabetes (HCC)  Lab Results   Component Value Date    HGBA1C 6.7 (H) 10/23/2024       Recent Labs     11/19/24 0428 11/19/24  0611  11/19/24  0726 11/19/24  0952   POCGLU 88 91 92 171*       Blood Sugar Average: Last 72 hrs:  (P) 211.3298807863043032    On insulin gtt, management per primary team  Stage 3a chronic kidney disease (HCC)  Lab Results   Component Value Date    EGFR 76 11/19/2024    EGFR 72 11/19/2024    EGFR 63 11/18/2024    CREATININE 0.99 11/19/2024    CREATININE 1.03 11/19/2024    CREATININE 1.16 11/18/2024     Cellulitis of left lower extremity  POA, contributing to septic shock  Receiving IV cefepime, Day #5  UTI (urinary tract infection)  Cultures positive for greater than 100,000 CFU per milliliter Klebsiella pneumonia, 20-30,000 CFU per milliliter Enterococcus faecalis  On IV cefepime, day #5        Subjective   Patient says he feels better today and is not in as much abdominal discomfort    Objective :  Temp:  [97.4 °F (36.3 °C)-99 °F (37.2 °C)] 98.3 °F (36.8 °C)  HR:  [108-131] 114  BP: ()/() 112/53  Resp:  [12-61] 22  SpO2:  [93 %-100 %] 98 %    Physical Exam  Vitals and nursing note reviewed.   Constitutional:       Appearance: He is ill-appearing.      Comments: Patient more alert today.  Answering questions and following commands appropriately   HENT:      Head: Normocephalic.      Mouth/Throat:      Mouth: Mucous membranes are moist.      Pharynx: Oropharynx is clear. No oropharyngeal exudate.   Eyes:      Extraocular Movements: Extraocular movements intact.      Pupils: Pupils are equal, round, and reactive to light.   Cardiovascular:      Rate and Rhythm: Tachycardia present.      Pulses: Normal pulses.      Heart sounds: Normal heart sounds.   Pulmonary:      Effort: Pulmonary effort is normal.      Breath sounds: Normal breath sounds.   Abdominal:      General: There is distension.      Tenderness: There is abdominal tenderness.      Comments: Abdomen less distended and more soft than exam prior   Musculoskeletal:      Cervical back: Normal range of motion.      Right lower leg: Edema present.       Left lower leg: Edema present.      Comments: All 4 extremities are edematous   Neurological:      General: No focal deficit present.      Mental Status: He is alert and oriented to person, place, and time.      Comments: Mild asterixis present  Able to appropriately follow commands and respond to questions   Psychiatric:         Mood and Affect: Mood normal.         Behavior: Behavior normal.           Lab Results: I have reviewed the following results:

## 2024-11-19 NOTE — CASE MANAGEMENT
Case Management Assessment & Discharge Planning Note    Patient name Isael Avendano  Location ICU /ICU  MRN [id number]  : 1955 Date 2024       Current Admission Date: 11/15/2024  Current Admission Diagnosis:Shock (HCC)   Patient Active Problem List    Diagnosis Date Noted Date Diagnosed    UTI (urinary tract infection) 2024     Moderate protein-calorie malnutrition (HCC) 2024     SBP (spontaneous bacterial peritonitis) (HCC) 2024     Cellulitis of left lower extremity 2024     Shock (HCC) 11/15/2024     Decompensated cirrhosis (HCC) 11/15/2024     Diabetic ulcer of left midfoot associated with type 2 diabetes mellitus, with fat layer exposed (HCC) 2024     Thrombocytopenia (HCC) 2024     Hepatocellular carcinoma (HCC) 2024     Iron deficiency anemia due to chronic blood loss 2024     Splenomegaly, congestive, chronic 2024     Volume overload 2024     Chest wall mass 2024     Symptomatic anemia 2024     Perineal abscess 2023     Pyogenic arthritis of left shoulder region (HCC) 2023     Urinary retention 2023     Stage 3a chronic kidney disease (HCC) 06/15/2023     Murmur 06/15/2023     Postoperative infection of surgical wound 2023     Ulcer of left foot, limited to breakdown of skin (HCC) 2023     Aftercare following left shoulder joint replacement surgery 2023     S/P reverse total shoulder arthroplasty, left 2023     Post-traumatic osteoarthritis of left shoulder 2022     Cellulitis 2022     Diabetic ulcer of left foot associated with type 2 diabetes mellitus (HCC) 2022     Insomnia 2020     Elevated troponin 2020     Chest pressure 2020     Alcohol abuse 2020     Diabetes (HCC) 2020     Essential hypertension 2020     Cholelithiasis 2020     Cirrhosis, alcoholic (HCC) 2020     MEG (obstructive sleep apnea)  12/25/2020     Pancytopenia (HCC) 12/25/2020       LOS (days): 4  Geometric Mean LOS (GMLOS) (days): 2.1  Days to GMLOS:-1.9     OBJECTIVE:    Risk of Unplanned Readmission Score: 31.04         Current admission status: Inpatient       Preferred Pharmacy:   [id number] - [place], [place] - [address]  [address] [facility]  [place] [address]  Phone: [phone] Fax: [phone]    Primary Care Provider: Collin Marcos MD    Primary Insurance: MEDICARE  Secondary Insurance: AETNA    ASSESSMENT:  Active Health Care Proxies       Kaylah Avendano Health Care Agent - Spouse   Primary Phone: [phone] (Mobile)  Home Phone: [phone]                 Patient Information  Admitted from:: Home  Mental Status: Alert  During Assessment patient was accompanied by: Spouse (Kaylah)  Assessment information provided by:: Spouse  Primary Caregiver: Family  Caregiver's Name:: Wife, Kaylah and daughter  Support Systems: Spouse/significant other, Daughter  County of Residence: [place]  What city do you live in?: [place]  Home entry access options. Select all that apply.: Stairs  Number of steps to enter home.: 1  Type of Current Residence: [facility]  Living Arrangements: Lives w/ Spouse/significant other (One daughter lives about 8 minutes away and another about an 1 hour away)    Activities of Daily Living Prior to Admission  Functional Status: Assistance  Completes ADLs independently?: No  Level of ADL dependence: Assistance  Ambulates independently?: Yes  Does patient use assisted devices?: Yes  Assisted Devices (DME) used: Walker, Shower Chair, Bedside Commode, Hospital Bed  Does patient currently own DME?: Yes  What DME does the patient currently own?: Bedside Commode, Hospital Bed, Walker, Shower Chair, Other (Comment), Wheelchair (transport chair)  Does patient have a history of Outpatient Therapy (PT/OT)?: No  Does the patient have a history of Short-Term Rehab?: No  Does patient have a history of HHC?: Yes  Does patient  currently have HHC?: Yes    Current Home Health Care  Type of Current Home Care Services: Home PT, Home OT, Nurse visit  Home Health Agency Name:: [facility]  Current Home Health Follow-Up Provider:: PCP    Patient Information Continued  Income Source: Pension/longterm  Does patient have prescription coverage?: Yes  Does patient receive dialysis treatments?: No  Does patient have a history of substance abuse?: Yes  Historical substance use preference: Alcohol/ETOH  Is patient currently in treatment for substance abuse?: N/A - sober (5 years sober)  Does patient have a history of Mental Health Diagnosis?: No    Means of Transportation  Means of Transport to Appts:: Family transport    DISCHARGE DETAILS:    Discharge planning discussed with:: wife, Kaylah  Freedom of Choice: Yes  Comments - Freedom of Choice: RENATO with [facility]    Contacts  Patient Contacts: Kaylah Avendano (Spouse)  Relationship to Patient:: Family  Contact Method: In Person  Reason/Outcome: Discharge Planning, Continuity of Care, Referral, Emergency Contact    Other Referral/Resources/Interventions Provided:  Interventions: Other (Specify)  Referral Comments: CM met with pt's wife, Kaylah, at bedside. Introduced self/role with dcp. Pt resides at home with his wife. She reports she did have a HHA coming to the home previously. Kaylah reports pt has been able ot ambulate with a walker around the home. She has good support with her two daughters and family. Kaylah reports pt is open to [facility] and plan would be for pt to return home with their services. Kaylah reports pt did complete his IV abx and was awaiting the PICC line to be pulled, but was admitted prior to this happening. Aware CM will place referral to [facility] and f/u with any additional recommendations for dcp.

## 2024-11-20 PROBLEM — Z51.5 PALLIATIVE CARE BY SPECIALIST: Status: ACTIVE | Noted: 2024-11-20

## 2024-11-20 PROBLEM — Z71.89 COUNSELING REGARDING ADVANCE CARE PLANNING AND GOALS OF CARE: Status: ACTIVE | Noted: 2024-11-20

## 2024-11-20 PROBLEM — G89.3 CANCER RELATED PAIN: Status: ACTIVE | Noted: 2024-11-20

## 2024-11-20 NOTE — ASSESSMENT & PLAN NOTE
Chronic, stable  Previous workups show etiology is a secondary of splenic sequestration  Recent Labs     11/18/24  0514 11/19/24  0430 11/20/24  0356   WBC 8.74 7.64 8.51     Recent Labs     11/18/24  0514 11/19/24  0430 11/20/24  0356   PLT 54* 52* 62*     Recent Labs     11/18/24  0514 11/19/24  0430 11/20/24  0356   HGB 7.4* 7.3* 8.0*     Continue to monitor CBC and signs of bleeding

## 2024-11-20 NOTE — PROGRESS NOTES
Progress Note - Gastroenterology   Name: Isael Avendano 69 y.o. male I MRN: [id number]  Unit/Bed#: ICU 01 I Date of Admission: 11/15/2024   Date of Service: 11/20/2024 I Hospital Day: 5    Assessment & Plan  Decompensated cirrhosis (HCC)  Cirrhosis secondary to alcohol abuse, decompensation likely from septic shock  Patient seen in the emergency department on November 12 recurrent ascites where paracentesis was performed and he was discharged home -5 L of fluid removed at that time.  Patient declined admission during that visit.  Return to the emergency department 3 days later with lower extremity swelling found to have cellulitis and septic shock -paracentesis performed on admission  Fluid analysis from November 15 show ascitic fluid neutrophils of 239, no bacteria growth  Fluid now reaccumulating in the abdomen causing discomfort, physical exam shows mildly tender and largely distended abdomen with positive fluid wave shift, patient is AAO x 4   MELD 3.0: 19 at 11/20/2024  6:29 AM  MELD-Na: 19 at 11/20/2024  6:29 AM  Calculated from:  Serum Creatinine: 1.03 mg/dL at 11/20/2024  3:56 AM  Serum Sodium: 139 mmol/L (Using max of 137 mmol/L) at 11/20/2024  3:56 AM  Total Bilirubin: 3.65 mg/dL at 11/20/2024  3:56 AM  Serum Albumin: 3.2 g/dL at 11/20/2024  3:56 AM  INR(ratio): 2 at 11/20/2024  6:29 AM  Age at listing (hypothetical): 69 years  Sex: Male at 11/20/2024  6:29 AM  S/p paracentesis 11/18 - 4L fluid removed, fluid analysis does not meet criteria for SBP.    Abdominal exam similar to yesterday - slightly more distended and firm.  Patient not as alert and awake as yesterday.    Patient will need to resume more aggressive diuretic therapy once he is able to come off of pressors  Continue albumin repletion, especially with paracentesis > 5L  Alcohol cessation, low-salt diet, fluid restriction  Continue Bumex 1 mg twice daily, wait for further hemodynamic stability until incorporation of Aldactone  Continue lactulose  20 g tid and rifaximin 550 bid  Monitor MELD labs  Hepatocellular carcinoma (HCC)  Managed by hematology/oncology at [facility] as recent as 10/1/2024  Plan was to proceed with TACE however no feasible access to vessels and the procedure was aborted  After receiving immunotherapy with tremelimumab/durvalumab, currently on hold in the setting of acute illness  Viability of liver transplant to be determined  Will need to inquire about timing of resuming immunotherapy and continuity of care with local oncologist  Palliative care consulted, dexamethasone started  Pancytopenia (HCC)  Chronic, stable  Previous workups show etiology is a secondary of splenic sequestration  Recent Labs     11/18/24  0514 11/19/24  0430 11/20/24  0356   WBC 8.74 7.64 8.51     Recent Labs     11/18/24  0514 11/19/24  0430 11/20/24  0356   PLT 54* 52* 62*     Recent Labs     11/18/24  0514 11/19/24  0430 11/20/24  0356   HGB 7.4* 7.3* 8.0*     Continue to monitor CBC and signs of bleeding  Varices, esophageal (HCC)  EGD on 8/15 shows two tortuous varices in the upper third of the esophagus  Currently stable and without evidence of bleeding  Trend CBC and monitor s/s of variceal bleeding  No beta blocker at this time in the setting of septic shock  Shock (HCC)  Sepsis in the setting of lower extremity cellulitis and Klebsiella and +/- Enterococcus faecalis UTI, nosocomial SBP also suspected source  Maintained on cefepime 2 g every 8 hours, day #5  Blood Pressure: 94/53, requiring 3 mcg/min of Levophed  Continue antibiotics and pressors per primary team  Agree with holding Aldactone, continue Bumex 1 mg twice daily for management of ascites  Trend CBC and CMP  Monitor daily MELD labs  Diabetes (HCC)  Lab Results   Component Value Date    HGBA1C 6.7 (H) 10/23/2024       Recent Labs     11/19/24  0952 11/19/24  1514 11/19/24 2056 11/20/24  0716   POCGLU 171* 167* 246* 206*       Blood Sugar Average: Last 72 hrs:  (P)  204.0490551688947115    On insulin gtt, management per primary team  Stage 3a chronic kidney disease (HCC)  Lab Results   Component Value Date    EGFR 72 11/20/2024    EGFR 76 11/19/2024    EGFR 72 11/19/2024    CREATININE 1.03 11/20/2024    CREATININE 0.99 11/19/2024    CREATININE 1.03 11/19/2024     Cellulitis of left lower extremity  POA, contributing to septic shock  Receiving IV cefepime, Day #5  UTI (urinary tract infection)  Cultures positive for greater than 100,000 CFU per milliliter Klebsiella pneumonia, 20-30,000 CFU per milliliter Enterococcus faecalis  On IV cefepime, day #5  Moderate protein-calorie malnutrition (HCC)  Malnutrition Findings:   Adult Malnutrition type: Acute illness  Adult Degree of Malnutrition: Malnutrition of moderate degree  Malnutrition Characteristics: Inadequate energy, Other (comment) (edema, ascited, skin breakdown)                  360 Statement: Malnutrition of moderate degree in the context of chronic illness as evidenced by edema, ascites, inadequate energy intake, skin breadown r/t liver disease. Treated with oral diet and will supplement with high kcal and protein supplement.    BMI Findings:           Body mass index is 32.29 kg/m².           Subjective   Patient not awake enough to answer questions appropriately.    Objective :  Temp:  [98.2 °F (36.8 °C)-99 °F (37.2 °C)] 98.5 °F (36.9 °C)  HR:  [106-141] 114  BP: ()/(45-69) 94/53  Resp:  [11-31] 22  SpO2:  [95 %-98 %] 97 %    Physical Exam  Vitals and nursing note reviewed.   Constitutional:       Appearance: He is ill-appearing.   HENT:      Head: Normocephalic.      Mouth/Throat:      Mouth: Mucous membranes are moist.      Pharynx: Oropharynx is clear. No oropharyngeal exudate.   Eyes:      Extraocular Movements: Extraocular movements intact.      Pupils: Pupils are equal, round, and reactive to light.   Cardiovascular:      Rate and Rhythm: Tachycardia present.      Pulses: Normal pulses.      Heart sounds:  Normal heart sounds.   Pulmonary:      Effort: Pulmonary effort is normal.      Breath sounds: Normal breath sounds.   Abdominal:      General: There is distension.      Tenderness: There is abdominal tenderness.   Musculoskeletal:      Cervical back: Normal range of motion.      Right lower leg: Edema present.      Left lower leg: Edema present.      Comments: All 4 extremities are edematous   Neurological:      General: No focal deficit present.      Mental Status: He is alert and oriented to person, place, and time.      Comments: Mild asterixis present     Psychiatric:         Mood and Affect: Mood normal.         Behavior: Behavior normal.           Lab Results: I have reviewed the following results:

## 2024-11-20 NOTE — ASSESSMENT & PLAN NOTE
Managed by hematology/oncology at [facility] as recent as 10/1/2024  Plan was to proceed with TACE however no feasible access to vessels and the procedure was aborted  After receiving immunotherapy with tremelimumab/durvalumab, currently on hold in the setting of acute illness  Viability of liver transplant to be determined  Will need to inquire about timing of resuming immunotherapy and continuity of care with local oncologist  Palliative care consulted, dexamethasone started

## 2024-11-20 NOTE — PLAN OF CARE
Problem: Potential for Falls  Goal: Patient will remain free of falls  Description: INTERVENTIONS:  - Educate patient/family on patient safety including physical limitations  - Instruct patient to call for assistance with activity   - Consult OT/PT to assist with strengthening/mobility   - Keep Call bell within reach  - Keep bed low and locked with side rails adjusted as appropriate  - Keep care items and personal belongings within reach  - Initiate and maintain comfort rounds  - Make Fall Risk Sign visible to staff  - Apply yellow socks and bracelet for high fall risk patients  - Consider moving patient to room near nurses station  Outcome: Progressing     Problem: Prexisting or High Potential for Compromised Skin Integrity  Goal: Skin integrity is maintained or improved  Description: INTERVENTIONS:  - Identify patients at risk for skin breakdown  - Assess and monitor skin integrity  - Assess and monitor nutrition and hydration status  - Monitor labs   - Assess for incontinence   - Turn and reposition patient  - Assist with mobility/ambulation  - Relieve pressure over bony prominences  - Avoid friction and shearing  - Provide appropriate hygiene as needed including keeping skin clean and dry  - Evaluate need for skin moisturizer/barrier cream  - Collaborate with interdisciplinary team   - Patient/family teaching  - Consider wound care consult   Outcome: Progressing     Problem: PAIN - ADULT  Goal: Verbalizes/displays adequate comfort level or baseline comfort level  Description: Interventions:  - Encourage patient to monitor pain and request assistance  - Assess pain using appropriate pain scale  - Administer analgesics based on type and severity of pain and evaluate response  - Implement non-pharmacological measures as appropriate and evaluate response  - Consider cultural and social influences on pain and pain management  - Notify physician/advanced practitioner if interventions unsuccessful or patient reports  new pain  Outcome: Progressing     Problem: INFECTION - ADULT  Goal: Absence or prevention of progression during hospitalization  Description: INTERVENTIONS:  - Assess and monitor for signs and symptoms of infection  - Monitor lab/diagnostic results  - Monitor all insertion sites, i.e. indwelling lines, tubes, and drains  - Monitor endotracheal if appropriate and nasal secretions for changes in amount and color  - [place] appropriate cooling/warming therapies per order  - Administer medications as ordered  - Instruct and encourage patient and family to use good hand hygiene technique  - Identify and instruct in appropriate isolation precautions for identified infection/condition  Outcome: Progressing  Goal: Absence of fever/infection during neutropenic period  Description: INTERVENTIONS:  - Monitor WBC    Outcome: Progressing     Problem: SAFETY ADULT  Goal: Patient will remain free of falls  Description: INTERVENTIONS:  - Educate patient/family on patient safety including physical limitations  - Instruct patient to call for assistance with activity   - Consult OT/PT to assist with strengthening/mobility   - Keep Call bell within reach  - Keep bed low and locked with side rails adjusted as appropriate  - Keep care items and personal belongings within reach  - Initiate and maintain comfort rounds  - Make Fall Risk Sign visible to staff  - Apply yellow socks and bracelet for high fall risk patients  - Consider moving patient to room near nurses station  Outcome: Progressing  Goal: Maintain or return to baseline ADL function  Description: INTERVENTIONS:  -  Assess patient's ability to carry out ADLs; assess patient's baseline for ADL function and identify physical deficits which impact ability to perform ADLs (bathing, care of mouth/teeth, toileting, grooming, dressing, etc.)  - Assess/evaluate cause of self-care deficits   - Assess range of motion  - Assess patient's mobility; develop plan if impaired  - Assess  patient's need for assistive devices and provide as appropriate  - Encourage maximum independence but intervene and supervise when necessary  - Involve family in performance of ADLs  - Assess for home care needs following discharge   - Consider OT consult to assist with ADL evaluation and planning for discharge  - Provide patient education as appropriate  Outcome: Progressing  Goal: Maintains/Returns to pre admission functional level  Description: INTERVENTIONS:  - Perform AM-PAC 6 Click Basic Mobility/ Daily Activity assessment daily.  - Set and communicate daily mobility goal to care team and patient/family/caregiver.   - Collaborate with rehabilitation services on mobility goals if consulted  - Out of bed for meals 3 times a day  - Out of bed for toileting  - Record patient progress and toleration of activity level   Outcome: Progressing

## 2024-11-20 NOTE — ASSESSMENT & PLAN NOTE
Diagnosed 6/2024  Follows with [facility] oncology  S/p Tremelimumab/Durvalumab c/b Listeria infection and SBP

## 2024-11-20 NOTE — ASSESSMENT & PLAN NOTE
Cirrhosis secondary to alcohol abuse, decompensation likely from septic shock  Patient seen in the emergency department on November 12 recurrent ascites where paracentesis was performed and he was discharged home -5 L of fluid removed at that time.  Patient declined admission during that visit.  Return to the emergency department 3 days later with lower extremity swelling found to have cellulitis and septic shock -paracentesis performed on admission  Fluid analysis from November 15 show ascitic fluid neutrophils of 239, no bacteria growth  Fluid now reaccumulating in the abdomen causing discomfort, physical exam shows mildly tender and largely distended abdomen with positive fluid wave shift, patient is AAO x 4   MELD 3.0: 19 at 11/20/2024  6:29 AM  MELD-Na: 19 at 11/20/2024  6:29 AM  Calculated from:  Serum Creatinine: 1.03 mg/dL at 11/20/2024  3:56 AM  Serum Sodium: 139 mmol/L (Using max of 137 mmol/L) at 11/20/2024  3:56 AM  Total Bilirubin: 3.65 mg/dL at 11/20/2024  3:56 AM  Serum Albumin: 3.2 g/dL at 11/20/2024  3:56 AM  INR(ratio): 2 at 11/20/2024  6:29 AM  Age at listing (hypothetical): 69 years  Sex: Male at 11/20/2024  6:29 AM  S/p paracentesis 11/18 - 4L fluid removed, fluid analysis does not meet criteria for SBP.    Abdominal exam similar to yesterday - slightly more distended and firm.  Patient not as alert and awake as yesterday.    Patient will need to resume more aggressive diuretic therapy once he is able to come off of pressors  Continue albumin repletion, especially with paracentesis > 5L  Alcohol cessation, low-salt diet, fluid restriction  Continue Bumex 1 mg twice daily, wait for further hemodynamic stability until incorporation of Aldactone  Continue lactulose 20 g tid and rifaximin 550 bid  Monitor MELD labs

## 2024-11-20 NOTE — ASSESSMENT & PLAN NOTE
Lab Results   Component Value Date    HGBA1C 6.7 (H) 10/23/2024       Recent Labs     11/19/24  0952 11/19/24  1514 11/19/24 2056 11/20/24  0716   POCGLU 171* 167* 246* 206*       Blood Sugar Average: Last 72 hrs:  (P) 204.0850866019197972    On insulin gtt, management per primary team

## 2024-11-20 NOTE — ASSESSMENT & PLAN NOTE
In setting cellulitis vs UTI  Microbiology showing urine culture with Klebsiella pneumonia and Enterococcus faecalis  On supportive abx and vasopressors with CC team

## 2024-11-20 NOTE — ASSESSMENT & PLAN NOTE
Continue disease directed cares with limits outlined below    Goals of care conversation had with spouse, 2 daughters, and critical care team.  We reviewed overall clinical course and current concerns of continued decline despite all treatments.  Reviewed prior treatments with [facility] oncology and future plans for treatment.  Although we expressed hope that he would respond to current treatments, concerns were brought about continued decline and that even if he were to become more lucid and improve, that he would likely be bedbound and dependent for his ADLs, necessitating discharge to rehab facility.  Family agreed that patient would not want to go to a facility.  We spoke about his current compounding diagnoses of decompensated cirrhosis, infection, and hepatocellular carcinoma.  Education and counseling was provided on comfort based care/hospice cares, including philosophy, medication purposes, team make-up, and levels of care. Advised on prognosis of days to weeks while on hospice.     Patient's family would like to think further about their different options.  We will remain available for any kind of support and assistance.     Code Status: DNAR/DNI - Level 3   Decisional apparatus:  Patient is not competent on my exam today.  If competence is lost, patient's substitute decision maker would default to spouse by [place] Act 169.   Advance Directive / Living Will / POLST:  Reportedly POA document; unable to view

## 2024-11-20 NOTE — ASSESSMENT & PLAN NOTE
Sepsis in the setting of lower extremity cellulitis and Klebsiella and +/- Enterococcus faecalis UTI, nosocomial SBP also suspected source  Maintained on cefepime 2 g every 8 hours, day #5  Blood Pressure: 94/53, requiring 3 mcg/min of Levophed  Continue antibiotics and pressors per primary team  Agree with holding Aldactone, continue Bumex 1 mg twice daily for management of ascites  Trend CBC and CMP  Monitor daily MELD labs

## 2024-11-20 NOTE — CONSULTS
Inpatient Consultation - Palliative and Supportive Care   Isael Avendano 69 y.o. male [id number]    Cancer related pain  Assessment & Plan  Concern patient is having a significant amount of abdominal pain from tumor burden  Also concerned about possible peritoneal seeding from malignancy    Will start dexamethasone medication---> 4 mg IV dose once today followed by 2 mg twice daily tomorrow; reviewed risks/benefits and mechanism of action with patient's spouse    Continue oxycodone IR 5 mg q6h prn  Recommend Dilaudid 0.5mg IV q6h prn for breakthrough pain if unable to tolerate PO    Hepatocellular carcinoma (HCC)  Assessment & Plan  Diagnosed 6/2024  Follows with [facility] oncology  S/p Tremelimumab/Durvalumab c/b Listeria infection and SBP    * Shock (HCC)  Assessment & Plan  In setting cellulitis vs UTI?  Microbiology pending  On supportive abx and vasopressors with CC team    Moderate protein-calorie malnutrition (HCC)  Assessment & Plan  Malnutrition Findings:   Adult Malnutrition type: Acute illness  Adult Degree of Malnutrition: Malnutrition of moderate degree  Malnutrition Characteristics: Inadequate energy, Other (comment) (edema, ascited, skin breakdown)                  360 Statement: Malnutrition of moderate degree in the context of chronic illness as evidenced by edema, ascites, inadequate energy intake, skin breadown r/t liver disease. Treated with oral diet and will supplement with high kcal and protein supplement.    BMI Findings:           Body mass index is 32.29 kg/m².       Decompensated cirrhosis (HCC)  Assessment & Plan  MELD-Na 19 11/20/2024  Evaluated for transplant at [facility]-->not eligible  Hx of varices and recurrent ascites; hx of SBP  S/p paracentesis this admission  Gastroenterology is following    Stage 3a chronic kidney disease (HCC)  Assessment & Plan  Lab Results   Component Value Date    EGFR 72 11/20/2024    EGFR 76 11/19/2024    EGFR 72 11/19/2024    CREATININE 1.03 11/20/2024     CREATININE 0.99 11/19/2024    CREATININE 1.03 11/19/2024       Diabetes (HCC)  Assessment & Plan  Lab Results   Component Value Date    HGBA1C 6.7 (H) 10/23/2024       Recent Labs     11/19/24  1514 11/19/24  2056 11/20/24  0716 11/20/24  1123   POCGLU 167* 246* 206* 226*       Blood Sugar Average: Last 72 hrs:  (P) 205.7656298578884354    Monitor serum glucose while on dexamethasone medication    Counseling regarding advance care planning and goals of care  Assessment & Plan  Continue disease directed cares with limits outlined below  Spouse will reach out to 2 daughters and contact palliative care service when ready for goals of care meeting (PSC contact information provided)     Code Status: DNAR/DNI - Level 3   Decisional apparatus:  Patient is not competent on my exam today.  If competence is lost, patient's substitute decision maker would default to spouse by [place] Act 169.   Advance Directive / Living Will / POLST:  Reportedly POA document; unable to view    Palliative care by specialist  Assessment & Plan  Palliative diagnosis: Decompensated cirrhosis with hepatocellular carcinoma  PPS: 30-40%    Education and counseling provided on role/purpose of palliative care service  Supportive listening and presence provided  Communicated and coordinated with critical care team and RN team    Psychosocial and spiritual:  Patient loves farming, sailing, diving, and his grandkids  Supported by spouse Kaylah ( 44 years)  2 adult daughters Leona and Jayson Srivastava (cows) 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         We appreciate the invitation to be involved in this patient's care.  We will continue to follow.  Please  do not hesitate to reach our on call provider through our clinic answering service at [phone] should you have acute symptom control concerns.    Eulalio Hutchins DO  Palliative and Supportive Care  Clinic/Answering Service: [phone]  You can find me on [employer] Secure Chat!       IDENTIFICATION:  Inpatient consult to Palliative Care  Consult performed by: Eulalio Hutchins DO  Consult ordered by: Irwin Lester MD        Physician Requesting Consult: Favian Gore,*  Reason for Consult / Principal Problem: Goals/support/symptoms  Hx and PE limited by: Lethargy/acuity of illness    NARRATIVE AND INTERVAL HISTORY:       Isael Avendano is a 69 y.o. male with decompensated cirrhosis and hepatocellular carcinoma who presented on 11/15/2024 with left lower extremity tenderness and abdominal fullness.  Recently, had a hospital admission and ED visit due to decompensated cirrhosis and Listeria infection (SBP).  Upon presentation to the ED, vital signs were significant for tachycardia, tachypnea, and hypotension with concern for shock--BP 80-90/40-50.  Lab work was significant for hyponatremia 130, BUN 39, creatinine 1.67, , total protein 5.1, albumin 2.3, total bilirubin 2.51, ammonia 74, procalcitonin 3.19, INR 2.01, PTT 42, lactic acid 3.2, hemoglobin 8.9, platelets 70,000.  X-ray imaging revealed mild to moderate pulmonary vascular congestion.  ECG revealed sinus tachycardia with PACs and poor anterior R wave progression with nonspecific ST and T wave abnormalities.  He was admitted to the ICU for concern of septic shock and vasopressor support.  Paracentesis was performed this admission.     For my encounter today, the patient is unable to participate, but spoke with his wife Kaylah.  She outlined overall course of his cancer diagnosis.  They have been going to [facility] for treatment and he underwent transplant evaluation.  Due to tumor burden, he was disqualified from transplantation, but was eligible for  immunotherapy.  After having 1-2 doses of immunotherapy, he clinically declined and about 3 weeks ago had a peritoneal infection with Listeria.  Patient spouse is fearful of the future.  She would like to have a family meeting with her daughters present, but would like to talk with them first.  She explains that the patient is good hearted and very well-connected with community. See rest of history in A/P above.     Past Medical History:   Diagnosis Date    ABILIO (acute kidney injury) (HCC) 12/25/2020    ABILIO (acute kidney injury) (HCC) 12/25/2020    Arrhythmia     Chronic kidney disease     Colon polyp     COVID 12/2020    CPAP (continuous positive airway pressure) dependence     Diabetes mellitus (HCC)     History of echocardiogram 11/14/2017    showed EF of 50-55 percentWith moderate LVH and left ventricle diastolic dysfunction. Left atrium was moderately enlarged. Trace MR noted.     History of Holter monitoring 11/21/2017    showed baseline rhythm of sinus origin with an average heart it of 61 bpm. The lowest heart rate was 49 and the highest heart rate was 10 8 bpm. There were rare single VPCs, and frequent PACs representing 3.2% of total beats. There were several episodes of sinus arrhythmias with sinus bradycardia and heart rate ranging from 40-90 bpm. No sustained dysrhythmias, or pauses noted. The patient did not    History of transfusion 04/2023    platelets    Hypertension     Hyponatremia 11/15/2024    Liver disease     cirhosis    Morbid obesity (HCC) 12/08/2022    Murmur, cardiac     Obese     Obesity, morbid (HCC) 12/08/2022    Sleep apnea      Past Surgical History:   Procedure Laterality Date    CATARACT EXTRACTION      EYE SURGERY Left 1997    FL GUIDED NEEDLE PLAC BX/ASP/INJ  8/28/2023    HERNIA REPAIR  [id number]    IR BIOPSY LIVER MASS  6/28/2024    IR PARACENTESIS  2/19/2024    IR PARACENTESIS  2/21/2024    JOINT REPLACEMENT Right     TKR    KNEE ARTHROPLASTY Right 2008    AL ARTHROPLASTY  GLENOHUMERAL JOINT TOTAL SHOULDER Left 2023    Procedure: ARTHROPLASTY SHOULDER REVERSE;  Surgeon: Marcelo Lester MD;  Location: [facility];  Service: Orthopedics    [place] JARED SHOULDER ARTHRPLSTY HUMERAL&GLENOID COMPNT Left 2023    Procedure: removal of antibiotic spacer, incision and debridement,  with revision reverse shoulder arthroplasty;  Surgeon: Lita Aguilar;  Location: [facility];  Service: Orthopedics    [place] JARED SHOULDER ARTHRPLSTY HUMERAL/GLENOID COMPNT Left 2023    Procedure: ARTHROPLASTY SHOULDER REVISION;  Surgeon: Lita Aguilar;  Location: [facility];  Service: Orthopedics    SHOULDER SURGERY Right 2002    WOUND DEBRIDEMENT Left 2023    Procedure: INCISION AND DRAINAGE (I&D) EXTREMITY, vac placement;  Surgeon: Marcelo Lester MD;  Location: [facility];  Service: Orthopedics     Social History     Socioeconomic History    Marital status: /Civil Union     Spouse name: Kaylah    Number of children: 2    Years of education: 16    Highest education level: Some college, no degree   Occupational History    Not on file   Tobacco Use    Smoking status: Former     Current packs/day: 0.00     Average packs/day: 0.5 packs/day for 20.0 years (10.0 ttl pk-yrs)     Types: Cigarettes     Start date:      Quit date:      Years since quittin.9    Smokeless tobacco: Never   Vaping Use    Vaping status: Never Used   Substance and Sexual Activity    Alcohol use: Not Currently     Comment: quit     Drug use: Never    Sexual activity: Not Currently     Partners: Female   Other Topics Concern    Not on file   Social History Narrative    · Most recent tobacco use screenin2018      · Do you currently or have you served in the [employer]:   No      · Live alone or with others:   with others      · High blood pressure:   Yes      · Exercise level:   Occasional      · Overweight:   Yes      · Obese:   Yes      · Diabetes:   Yes      Social Drivers of  Health     Financial Resource Strain: Low Risk  (10/24/2024)    Received from [facility]    Overall Financial Resource Strain (CARDIA)     Difficulty of Paying Living Expenses: Not hard at all   Food Insecurity: No Food Insecurity (10/24/2024)    Received from [facility]    Hunger Vital Sign     Worried About Running Out of Food in the Last Year: Never true     Ran Out of Food in the Last Year: Never true   Transportation Needs: No Transportation Needs (10/24/2024)    Received from [facility]    PRAPARE - Transportation     Lack of Transportation (Medical): No     Lack of Transportation (Non-Medical): No   Physical Activity: Not on file   Stress: Not on file   Social Connections: Not on file   Intimate Partner Violence: Not At Risk (10/24/2024)    Received from [facility]    Humiliation, Afraid, Rape, and Kick questionnaire     Fear of Current or Ex-Partner: No     Emotionally Abused: No     Physically Abused: No     Sexually Abused: No   Housing Stability: Low Risk  (10/24/2024)    Received from [facility]    Housing Stability Vital Sign     Unable to Pay for Housing in the Last Year: No     Number of Times Moved in the Last Year: 0     Homeless in the Last Year: No     Family History   Problem Relation Age of Onset    Diabetes Mother     Supraventricular tachycardia Mother     COPD Father     Heart disease Father     Other Father         Sepsis; related to UTI with complicating cardiac problems    Heart disease Paternal Grandmother     Prostate cancer Paternal Grandfather 82       MEDICATIONS / ALLERGIES:    all current active meds have been reviewed, current meds:   Current Facility-Administered Medications:     albumin human (FLEXBUMIN) 25 % injection 12.5 g, Q6H, Last Rate: 0 g (11/19/24 1056)    bumetanide (BUMEX) injection 1 mg, BID    ceFEPime (MAXIPIME) 2,000 mg in dextrose 5 % 50 mL IVPB, Q8H, Last Rate: 2,000 mg  (11/20/24 1145)    chlorhexidine (PERIDEX) 0.12 % oral rinse 15 mL, Q12H LEEANNE    dexamethasone (DECADRON) injection 2 mg, BID    heparin (porcine) subcutaneous injection 5,000 Units, Q8H LEEANNE    HYDROmorphone (DILAUDID) injection 0.5 mg, Q6H PRN    insulin glargine (LANTUS) subcutaneous injection 20 Units 0.2 mL, QAM    insulin lispro (HumALOG/ADMELOG) 100 units/mL subcutaneous injection 1-5 Units, HS    insulin lispro (HumALOG/ADMELOG) 100 units/mL subcutaneous injection 1-6 Units, TID AC **AND** Fingerstick Glucose (POCT), TID AC    lactulose (CHRONULAC) oral solution 20 g, TID    midodrine (PROAMATINE) tablet 10 mg, TID AC    moisture barrier miconazole 2% cream (aka MEIR MOISTURE BARRIER ANTIFUNGAL CREAM), BID    NOREPINEPHRINE 4 MG  ML NSS (CMPD ORDER) infusion, Titrated, Last Rate: 4 mcg/min (11/20/24 1150)    oxyCODONE (ROXICODONE) IR tablet 5 mg, Q6H PRN    rifaximin (XIFAXAN) tablet 550 mg, Q12H LEEANNE, and PTA meds:   Prior to Admission Medications   Prescriptions Last Dose Informant Patient Reported? Taking?   Insulin Pen Needle (BD Pen Needle Nayla 2nd Gen) 32G X 4 MM MISC  Self No No   Sig: For use with insulin pen. Pharmacy may dispense brand covered by insurance.   MILK THISTLE PO  Self Yes No   Sig: Take 1 tablet by mouth 2 (two) times a day   Multiple Vitamin (multivitamin) capsule  Self No No   Sig: Take 1 capsule by mouth daily   bumetanide (BUMEX) 1 mg tablet  Self No No   Sig: Take 1 tablet (1 mg total) by mouth daily Do not start before February 29, 2024.   glimepiride (AMARYL) 4 mg tablet  Self Yes No   Sig: Take 4 mg by mouth 2 (two) times a day   Patient not taking: Reported on 9/23/2024   insulin glargine (LANTUS) 100 units/mL subcutaneous injection  Self Yes No   Sig: Inject 40 Units under the skin daily at bedtime   Patient not taking: Reported on 9/23/2024   insulin regular (HumuLIN R,NovoLIN R) 100 units/mL injection  Self Yes No   Sig: Inject 10 Units under the skin 3 (three) times a  day with meals   Patient not taking: Reported on 9/23/2024   lactulose (CHRONULAC) 10 g/15 mL solution  Self Yes No   Sig: Take 20 g by mouth 2 (two) times a day   pantoprazole (PROTONIX) 40 mg tablet  Self No No   Sig: Take 1 tablet (40 mg total) by mouth daily   potassium chloride (Klor-Con M20) 20 mEq tablet  Self No No   Sig: Take 1 tablet (20 mEq total) by mouth daily Do not start before February 29, 2024.   Patient not taking: Reported on 9/23/2024   spironolactone (ALDACTONE) 50 mg tablet   Yes No   Sig: Take 50 mg by mouth daily   tamsulosin (FLOMAX) 0.4 mg  Self No No   Sig: Take 1 capsule (0.4 mg total) by mouth daily with dinner   traZODone (DESYREL) 50 mg tablet  Self Yes No   Sig: Take 50 mg by mouth daily at bedtime bedtime      Facility-Administered Medications: None       Allergies   Allergen Reactions    Sulfa Antibiotics Hives    Daptomycin Other (See Comments)     Possibly contributed to ABILIO on 6/2023 admission        OBJECTIVE:    Physical Exam  Physical Exam  Constitutional:       General: He is not in acute distress.     Appearance: He is ill-appearing.      Interventions: Nasal cannula in place.   HENT:      Head: Normocephalic and atraumatic.      Right Ear: External ear normal.      Left Ear: External ear normal.      Nose: Nose normal.      Mouth/Throat:      Mouth: Mucous membranes are dry.      Pharynx: Oropharynx is clear.   Eyes:      General: Scleral icterus present.      Conjunctiva/sclera: Conjunctivae normal.   Cardiovascular:      Rate and Rhythm: Normal rate and regular rhythm.      Pulses: Normal pulses.   Pulmonary:      Effort: Pulmonary effort is normal. No respiratory distress.   Abdominal:      General: There is distension.      Palpations: Abdomen is soft.      Tenderness: There is abdominal tenderness.   Musculoskeletal:      Right lower leg: Edema present.      Left lower leg: Edema present.   Skin:     Coloration: Skin is jaundiced and pale.   Neurological:      General:  "No focal deficit present.      Mental Status: He is lethargic.   Psychiatric:         Attention and Perception: He is inattentive.         Cognition and Memory: Cognition is impaired. Memory is not impaired.         Lab Results: I have personally reviewed pertinent labs., CBC:   Lab Results   Component Value Date    WBC 8.51 11/20/2024    HGB 8.0 (L) 11/20/2024    HCT 26.0 (L) 11/20/2024    MCV 82 11/20/2024    PLT 62 (L) 11/20/2024    RBC 3.19 (L) 11/20/2024    MCH 25.1 (L) 11/20/2024    MCHC 30.8 (L) 11/20/2024    RDW 21.9 (H) 11/20/2024    NRBC 0 11/20/2024   , CMP:   Lab Results   Component Value Date    SODIUM 139 11/20/2024    K 4.9 11/20/2024     (H) 11/20/2024    CO2 22 11/20/2024    BUN 41 (H) 11/20/2024    CREATININE 1.03 11/20/2024    CALCIUM 10.0 11/20/2024    AST 32 11/20/2024    ALT 26 11/20/2024    ALKPHOS 205 (H) 11/20/2024    EGFR 72 11/20/2024   , BMP:  Lab Results   Component Value Date    SODIUM 139 11/20/2024    K 4.9 11/20/2024     (H) 11/20/2024    CO2 22 11/20/2024    BUN 41 (H) 11/20/2024    CREATININE 1.03 11/20/2024    GLUC 213 (H) 11/20/2024    CALCIUM 10.0 11/20/2024    AGAP 8 11/20/2024    EGFR 72 11/20/2024   , PT/PTT:No results found for: \"PT\", \"PTT\"  Imaging Studies: Reviewed and interpreted the pertinent studies  EKG, Pathology, and Other Studies: Reviewed and interpreted the pertinent studies    I have spent a total time of 60 minutes in caring for this patient on the day of the visit/encounter including Risks and benefits of tx options, Instructions for management, Patient and family education, Impressions, Counseling / Coordination of care, Documenting in the medical record, Reviewing / ordering tests, medicine, procedures  , Obtaining or reviewing history  , and Communicating with other healthcare professionals .  "

## 2024-11-20 NOTE — ASSESSMENT & PLAN NOTE
Chronic, stable  Pt presented with a leukocytosis of 10 (baseline of 2-3), 8.51 today  Platelets stable   Worked up previously, secondary to hypersplenism.

## 2024-11-20 NOTE — ASSESSMENT & PLAN NOTE
Lab Results   Component Value Date    HGBA1C 6.7 (H) 10/23/2024       Recent Labs     11/20/24  1123 11/20/24  1507 11/20/24  2112 11/21/24  0708   POCGLU 226* 342* 333* 322*       Blood Sugar Average: Last 72 hrs:  (P) [id number]    Monitor serum glucose while on dexamethasone medication

## 2024-11-20 NOTE — ASSESSMENT & PLAN NOTE
Significant for decompensated cirrhosis, ?SBP, cellulitis  Lactic acid initially 3.0, normalized to 1.9 on 11/15  Bilateral lower extremity 2+ pitting edema, with more tenderness and erythema in the LLE, though warmth is similar bilaterally   WBCs 10.04 on admission from baseline of 2-3, 7.64 today with neutrophilic predominance  CXR - increased interstitial markings. Ucx - > 100k klebsiella, 20-29k enterococcus, Bcx no growth, peritoneal fluid culture - no growth  Hypotensive to 83/48 with MAP of 58, started on pressors  Suspect hypovolemic shock 2/2 sepsis and decompensated cirrhosis causing third-spacing and intravascular volume depletion despite total body volume up. Treated with ampicillin until night prior to presentation  Bedside paracentesis performed 11/18, with 4L output. Yellow and turbid. Sent out for white count and cx  VS stable, though pt remains hypotensive with labile BPs and tachycardic  MAP goal changed to 60, started on midodrine 5 mg TID yesterday, levophed down to 3 mcg/min  Blood cx negative after 4 days      Plan:  Cefepime 2 g q8 for suspected nosocomial SBP, LLE cellulitis, and UTI  Continue albumin 12.5 g 25% q6h  Wean pressors as able, currently on Levo 3 mcg/min via PICC

## 2024-11-20 NOTE — ASSESSMENT & PLAN NOTE
HCC, prior history of heavy etoh use. MELD 25  Abdominal distention, tense until yesterday. Since paracentesis on 11/18, soft and mildly distended  Prior history of SBP, not on prophylaxis  Pt does not appear to be in any pain or discomfort on palpation of abdomen    Plan:  Daily MELD labs  Continue PTA lactulose 20 g BID per GI recommendations, adjust as needed  Sodium restricted diet  Resume diuretics once pt off pressors per GI  Discharge with nonselective beta blocker

## 2024-11-20 NOTE — ASSESSMENT & PLAN NOTE
Concern patient is having a significant amount of abdominal pain from tumor burden  Also concerned about possible peritoneal seeding from malignancy    Will start dexamethasone medication---> 4 mg IV dose once today followed by 2 mg twice daily tomorrow; reviewed risks/benefits and mechanism of action with patient's spouse    Continue oxycodone IR 5 mg q6h prn  Continue Dilaudid 0.5mg IV q6h prn for breakthrough pain if unable to tolerate PO

## 2024-11-20 NOTE — PROGRESS NOTES
Progress Note - Critical Care/ICU   Name: Isael Avendano 69 y.o. male I MRN: [id number]  Unit/Bed#: ICU 01 I Date of Admission: 11/15/2024   Date of Service: 11/20/2024 I Hospital Day: 5      Assessment & Plan  Shock (HCC)  Significant for decompensated cirrhosis, ?SBP, cellulitis  Lactic acid initially 3.0, normalized to 1.9 on 11/15  Bilateral lower extremity 2+ pitting edema, with more tenderness and erythema in the LLE, though warmth is similar bilaterally   WBCs 10.04 on admission from baseline of 2-3, 7.64 today with neutrophilic predominance  CXR - increased interstitial markings. Ucx - > 100k klebsiella, 20-29k enterococcus, Bcx no growth, peritoneal fluid culture - no growth  Hypotensive to 83/48 with MAP of 58, started on pressors  Suspect hypovolemic shock 2/2 sepsis and decompensated cirrhosis causing third-spacing and intravascular volume depletion despite total body volume up. Treated with ampicillin until night prior to presentation  Bedside paracentesis performed 11/18, with 4L output. Yellow and turbid. Sent out for white count and cx  VS stable, though pt remains hypotensive with labile BPs and tachycardic  MAP goal changed to 60, started on midodrine 5 mg TID yesterday, levophed down to 3 mcg/min  Blood cx negative after 4 days      Plan:  Cefepime 2 g q8 for suspected nosocomial SBP, LLE cellulitis, and UTI  Continue albumin 12.5 g 25% q6h  Wean pressors as able, currently on Levo 3 mcg/min via PICC  Decompensated cirrhosis (HCC)  HCC, prior history of heavy etoh use. MELD 25  Abdominal distention, tense until yesterday. Since paracentesis on 11/18, soft and mildly distended  Prior history of SBP, not on prophylaxis  Pt does not appear to be in any pain or discomfort on palpation of abdomen    Plan:  Daily MELD labs  Continue PTA lactulose 20 g BID per GI recommendations, adjust as needed  Sodium restricted diet  Resume diuretics once pt off pressors per GI  Discharge with nonselective beta  blocker  Diabetes (HCC)  Lab Results   Component Value Date    HGBA1C 6.7 (H) 10/23/2024       Recent Labs     11/19/24  0952 11/19/24  1514 11/19/24 2056 11/20/24  0716   POCGLU 171* 167* 246* 206*       Blood Sugar Average: Last 72 hrs:  (P) [id number]    BG elevated in the 300s  Started on insulin lantus 10 units yesterday, advanced to 20 units in the AM today    Plan:  Mealtime insulin sliding scale  Adjust basal bolus insulin and mealtime insulin as appropriate  Pancytopenia (HCC)  Chronic, stable  Pt presented with a leukocytosis of 10 (baseline of 2-3), 8.51 today  Platelets stable   Worked up previously, secondary to hypersplenism.  SBP (spontaneous bacterial peritonitis) (HCC)  As noted under shock  Cellulitis of left lower extremity  As noted under shock  Moderate protein-calorie malnutrition (HCC)  Malnutrition Findings:   Adult Malnutrition type: Acute illness  Adult Degree of Malnutrition: Malnutrition of moderate degree  Malnutrition Characteristics: Inadequate energy, Other (comment) (edema, ascited, skin breakdown)                  360 Statement: Malnutrition of moderate degree in the context of chronic illness as evidenced by edema, ascites, inadequate energy intake, skin breadown r/t liver disease. Treated with oral diet and will supplement with high kcal and protein supplement.    BMI Findings:           Body mass index is 32.29 kg/m².     Stage 3a chronic kidney disease (HCC)  Lab Results   Component Value Date    EGFR 72 11/20/2024    EGFR 76 11/19/2024    EGFR 72 11/19/2024    CREATININE 1.03 11/20/2024    CREATININE 0.99 11/19/2024    CREATININE 1.03 11/19/2024     Hepatocellular carcinoma (HCC)  PICC line in place, receiving immunotherapy at [place]  UTI (urinary tract infection)  Urine cx positive for >100k klebsiella and 20-29k enterococcus faecalis    Plan:  Continue cefepime 2g q8h  Varices, esophageal (HCC)  Plan to discharge with nonselective beta-blocker for ppx  Disposition:  Critical care    ICU Core Measures     A: Assess, Prevent, and Manage Pain Has pain been assessed? Yes  Need for changes to pain regimen? No   B: Both SAT/SAT  N/A   C: Choice of Sedation RASS Goal: -1 Drowsy  Need for changes to sedation or analgesia regimen? No   D: Delirium CAM-ICU: Negative   E: Early Mobility  Plan for early mobility? Yes   F: Family Engagement Plan for family engagement today? Yes       Antibiotic Review: Patient on appropriate coverage based on culture data.  and Continue broad spectrum secondary to severity of illness.     Review of Invasive Devices:    Tejeda Plan: Tejeda placed by urology. Removal plans per their team  Central access plan: Patient requires PICC secondary to immunotherapy      Prophylaxis:  VTE VTE covered by:  heparin (porcine), Subcutaneous, 5,000 Units at 11/20/24 0553       Stress Ulcer  not orderedcovered bypantoprazole (PROTONIX) 40 mg tablet [[id number]] (Long-Term Med)         24 Hour Events : pt continues to be in afib RVR, given one dose of metoprolol overnight, which he responded well to  Subjective  Pt was sleeping comfortably on my evaluation. Did not appear to be in any distress, but was somnolent.  Review of Systems: Review of Systems   Constitutional:  Positive for fatigue. Negative for fever.   Respiratory:  Negative for cough, choking, shortness of breath and wheezing.    Cardiovascular:  Positive for leg swelling. Negative for chest pain and palpitations.   Gastrointestinal:  Positive for abdominal distention and diarrhea. Negative for abdominal pain, constipation, nausea and vomiting.   Musculoskeletal:  Positive for myalgias.   Skin:  Positive for wound (wound care following).   Neurological:  Positive for weakness. Negative for light-headedness, numbness and headaches.       Objective :                   Vitals I/O      Most Recent Min/Max in 24hrs   Temp 98.5 °F (36.9 °C) Temp  Min: 98.2 °F (36.8 °C)  Max: 99 °F (37.2 °C)   Pulse (!) 114 Pulse  Min: 106   Max: 141   Resp 22 Resp  Min: 11  Max: 37   BP 94/53 BP  Min: 72/52  Max: 130/55   O2 Sat 97 % SpO2  Min: 95 %  Max: 99 %      Intake/Output Summary (Last 24 hours) at 2024 0718  Last data filed at 2024 0649  Gross per 24 hour   Intake 1379.9 ml   Output 1225 ml   Net 154.9 ml       Diet Alan/CHO Controlled; Consistent Carbohydrate Diet Level 2 (5 carb servings/75 grams CHO/meal); Fluid Restriction 1800 ML, Sodium 2 GM    Invasive Monitoring           Physical Exam   Physical Exam  Vitals reviewed.   Eyes:      General: No scleral icterus.     Conjunctiva/sclera: Conjunctivae normal.   Skin:     General: Skin is warm and dry.   HENT:      Head: Normocephalic and atraumatic.      Mouth/Throat:      Mouth: Mucous membranes are dry.   Cardiovascular:      Rate and Rhythm: Regular rhythm. Tachycardia present.      Heart sounds: Normal heart sounds.   Musculoskeletal:      Right lower le+ Edema present.      Left lower le+ Edema present.   Abdominal: General: There is distension.     Palpations: Abdomen is soft.   Constitutional:       General: He is sleeping. He is not in acute distress.  Pulmonary:      Effort: Pulmonary effort is normal. No accessory muscle usage, respiratory distress or accessory muscle usage.   Neurological:      Mental Status: He is easily aroused. He is lethargic, somnolent and calm.   Genitourinary/Anorectal:  Tejeda present.        Diagnostic Studies        Lab Results: I have reviewed the following results:     Medications:  Scheduled PRN   Albumin 25%, 12.5 g, Q6H  bumetanide, 1 mg, BID  cefepime, 2,000 mg, Q8H  chlorhexidine, 15 mL, Q12H LEEANNE  heparin (porcine), 5,000 Units, Q8H LEEANNE  insulin glargine, 20 Units, QAM  insulin lispro, 1-5 Units, HS  insulin lispro, 1-6 Units, TID AC  lactulose, 20 g, BID  midodrine, 5 mg, TID AC  MEIR ANTIFUNGAL, , BID  rifaximin, 550 mg, Q12H LEEANNE      oxyCODONE, 5 mg, Q6H PRN       Continuous    norepinephrine, 1-30 mcg/min, Last Rate: 3  mcg/min (11/20/24 0625)         Labs:   CBC    Recent Labs     11/19/24  0430 11/20/24  0356   WBC 7.64 8.51   HGB 7.3* 8.0*   HCT 23.7* 26.0*   PLT 52* 62*     BMP    Recent Labs     11/19/24  0747 11/20/24  0356   SODIUM 141 139   K 4.4 4.9   * 109*   CO2 26 22   AGAP 5 8   BUN 37* 41*   CREATININE 0.99 1.03   CALCIUM 9.5 10.0       Coags    Recent Labs     11/19/24  0430 11/20/24  0629   INR 2.10* 2.00*        Additional Electrolytes  Recent Labs     11/19/24  0430 11/20/24  0356   MG 2.1 2.3   PHOS 1.9* 2.8   CAIONIZED 1.26 1.26          Blood Gas    No recent results  No recent results LFTs  Recent Labs     11/19/24  0430 11/20/24  0356   ALT 22 26   AST 28 32   ALKPHOS 173* 205*   ALB 2.8* 3.2*   TBILI 2.93* 3.65*       Infectious  No recent results  Glucose  Recent Labs     11/18/24  1738 11/19/24  0430 11/19/24  0747 11/20/24  0356   GLUC 237* 92 96 213*

## 2024-11-20 NOTE — ASSESSMENT & PLAN NOTE
Lab Results   Component Value Date    EGFR 72 11/20/2024    EGFR 76 11/19/2024    EGFR 72 11/19/2024    CREATININE 1.03 11/20/2024    CREATININE 0.99 11/19/2024    CREATININE 1.03 11/19/2024

## 2024-11-20 NOTE — ASSESSMENT & PLAN NOTE
Lab Results   Component Value Date    HGBA1C 6.7 (H) 10/23/2024       Recent Labs     11/19/24  0952 11/19/24  1514 11/19/24 2056 11/20/24  0716   POCGLU 171* 167* 246* 206*       Blood Sugar Average: Last 72 hrs:  (P) 204.4279717656984520    BG elevated in the 300s  Started on insulin lantus 10 units yesterday, advanced to 20 units in the AM today    Plan:  Mealtime insulin sliding scale  Adjust basal bolus insulin and mealtime insulin as appropriate

## 2024-11-20 NOTE — PLAN OF CARE
Problem: Potential for Falls  Goal: Patient will remain free of falls  Description: INTERVENTIONS:  - Educate patient/family on patient safety including physical limitations  - Instruct patient to call for assistance with activity   - Consult OT/PT to assist with strengthening/mobility   - Keep Call bell within reach  - Keep bed low and locked with side rails adjusted as appropriate  - Keep care items and personal belongings within reach  - Initiate and maintain comfort rounds  - Make Fall Risk Sign visible to staff  - Offer Toileting every 2 Hours, in advance of need  - Initiate/Maintain bed alarm  - Obtain necessary fall risk management equipment  - Apply yellow socks and bracelet for high fall risk patients  - Consider moving patient to room near nurses station  Outcome: Progressing     Problem: Prexisting or High Potential for Compromised Skin Integrity  Goal: Skin integrity is maintained or improved  Description: INTERVENTIONS:  - Identify patients at risk for skin breakdown  - Assess and monitor skin integrity  - Assess and monitor nutrition and hydration status  - Monitor labs   - Assess for incontinence   - Turn and reposition patient  - Assist with mobility/ambulation  - Relieve pressure over bony prominences  - Avoid friction and shearing  - Provide appropriate hygiene as needed including keeping skin clean and dry  - Evaluate need for skin moisturizer/barrier cream  - Collaborate with interdisciplinary team   - Patient/family teaching  - Consider wound care consult   Outcome: Progressing     Problem: PAIN - ADULT  Goal: Verbalizes/displays adequate comfort level or baseline comfort level  Description: Interventions:  - Encourage patient to monitor pain and request assistance  - Assess pain using appropriate pain scale  - Administer analgesics based on type and severity of pain and evaluate response  - Implement non-pharmacological measures as appropriate and evaluate response  - Consider cultural and  social influences on pain and pain management  - Notify physician/advanced practitioner if interventions unsuccessful or patient reports new pain  Outcome: Progressing     Problem: INFECTION - ADULT  Goal: Absence or prevention of progression during hospitalization  Description: INTERVENTIONS:  - Assess and monitor for signs and symptoms of infection  - Monitor lab/diagnostic results  - Monitor all insertion sites, i.e. indwelling lines, tubes, and drains  - Monitor endotracheal if appropriate and nasal secretions for changes in amount and color  - [place] appropriate cooling/warming therapies per order  - Administer medications as ordered  - Instruct and encourage patient and family to use good hand hygiene technique  - Identify and instruct in appropriate isolation precautions for identified infection/condition  Outcome: Progressing  Goal: Absence of fever/infection during neutropenic period  Description: INTERVENTIONS:  - Monitor WBC    Outcome: Progressing     Problem: SAFETY ADULT  Goal: Patient will remain free of falls  Description: INTERVENTIONS:  - Educate patient/family on patient safety including physical limitations  - Instruct patient to call for assistance with activity   - Consult OT/PT to assist with strengthening/mobility   - Keep Call bell within reach  - Keep bed low and locked with side rails adjusted as appropriate  - Keep care items and personal belongings within reach  - Initiate and maintain comfort rounds  - Make Fall Risk Sign visible to staff  - Offer Toileting every 2 Hours, in advance of need  - Initiate/Maintain bed alarm  - Obtain necessary fall risk management equipment  - Apply yellow socks and bracelet for high fall risk patients  - Consider moving patient to room near nurses station  Outcome: Progressing  Goal: Maintain or return to baseline ADL function  Description: INTERVENTIONS:  -  Assess patient's ability to carry out ADLs; assess patient's baseline for ADL function and  identify physical deficits which impact ability to perform ADLs (bathing, care of mouth/teeth, toileting, grooming, dressing, etc.)  - Assess/evaluate cause of self-care deficits   - Assess range of motion  - Assess patient's mobility; develop plan if impaired  - Assess patient's need for assistive devices and provide as appropriate  - Encourage maximum independence but intervene and supervise when necessary  - Involve family in performance of ADLs  - Assess for home care needs following discharge   - Consider OT consult to assist with ADL evaluation and planning for discharge  - Provide patient education as appropriate  Outcome: Progressing  Goal: Maintains/Returns to pre admission functional level  Description: INTERVENTIONS:  - Perform AM-PAC 6 Click Basic Mobility/ Daily Activity assessment daily.  - Set and communicate daily mobility goal to care team and patient/family/caregiver.   - Collaborate with rehabilitation services on mobility goals if consulted  - Perform Range of Motion 3 times a day.  - Reposition patient every 2 hours.  - Dangle patient bed  times a day  - Stand patient 3 times a day  - Ambulate patient 3 times a day  - Out of bed to chair 3 times a day   - Out of bed for meals 3 times a day  - Out of bed for toileting  - Record patient progress and toleration of activity level   Outcome: Progressing     Problem: DISCHARGE PLANNING  Goal: Discharge to home or other facility with appropriate resources  Description: INTERVENTIONS:  - Identify barriers to discharge w/patient and caregiver  - Arrange for needed discharge resources and transportation as appropriate  - Identify discharge learning needs (meds, wound care, etc.)  - Arrange for interpretive services to assist at discharge as needed  - Refer to Case Management Department for coordinating discharge planning if the patient needs post-hospital services based on physician/advanced practitioner order or complex needs related to functional  status, cognitive ability, or social support system  Outcome: Progressing     Problem: Knowledge Deficit  Goal: Patient/family/caregiver demonstrates understanding of disease process, treatment plan, medications, and discharge instructions  Description: Complete learning assessment and assess knowledge base.  Interventions:  - Provide teaching at level of understanding  - Provide teaching via preferred learning methods  Outcome: Progressing     Problem: Nutrition/Hydration-ADULT  Goal: Nutrient/Hydration intake appropriate for improving, restoring or maintaining nutritional needs  Description: Monitor and assess patient's nutrition/hydration status for malnutrition. Collaborate with interdisciplinary team and initiate plan and interventions as ordered.  Monitor patient's weight and dietary intake as ordered or per policy. Utilize nutrition screening tool and intervene as necessary. Determine patient's food preferences and provide high-protein, high-caloric foods as appropriate.     INTERVENTIONS:  - Monitor oral intake, urinary output, labs, and treatment plans  - Assess nutrition and hydration status and recommend course of action  - Evaluate amount of meals eaten  - Assist patient with eating if necessary   - Allow adequate time for meals  - Recommend/ encourage appropriate diets, oral nutritional supplements, and vitamin/mineral supplements  - Order, calculate, and assess calorie counts as needed  - Recommend, monitor, and adjust tube feedings and TPN/PPN based on assessed needs  - Assess need for intravenous fluids  - Provide specific nutrition/hydration education as appropriate  - Include patient/family/caregiver in decisions related to nutrition  Outcome: Progressing

## 2024-11-20 NOTE — ASSESSMENT & PLAN NOTE
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

## 2024-11-20 NOTE — ASSESSMENT & PLAN NOTE
MELD-Na 19 11/20/2024  Evaluated for transplant at [facility]-->not eligible  Likely hepatic encephalopathy  Hx of varices and recurrent ascites; hx of SBP-->would recommend drainage catheter to be placed by IR this admission if patient goes on to hospice  S/p paracentesis this admission  Gastroenterology is following

## 2024-11-20 NOTE — ASSESSMENT & PLAN NOTE
Malnutrition Findings:   Adult Malnutrition type: Acute illness  Adult Degree of Malnutrition: Malnutrition of moderate degree  Malnutrition Characteristics: Inadequate energy, Other (comment) (edema, ascited, skin breakdown)                  360 Statement: Malnutrition of moderate degree in the context of chronic illness as evidenced by edema, ascites, inadequate energy intake, skin breadown r/t liver disease. Treated with oral diet and will supplement with high kcal and protein supplement.    BMI Findings:           Body mass index is 30.53 kg/m².

## 2024-11-20 NOTE — ASSESSMENT & PLAN NOTE
Lab Results   Component Value Date    EGFR 69 11/21/2024    EGFR 66 11/20/2024    EGFR 72 11/20/2024    CREATININE 1.07 11/21/2024    CREATININE 1.11 11/20/2024    CREATININE 1.03 11/20/2024

## 2024-11-21 ENCOUNTER — TELEPHONE (OUTPATIENT)
Age: 69
End: 2024-11-21

## 2024-11-21 NOTE — ASSESSMENT & PLAN NOTE
Significant for decompensated cirrhosis, ?SBP, cellulitis  Lactic acid initially 3.0, normalized to 1.9 on 11/15  Bilateral lower extremity 2+ pitting edema, with more tenderness and erythema in the LLE, though warmth is similar bilaterally   WBCs 10.04 on admission from baseline of 2-3, 7.64 today with neutrophilic predominance  CXR - increased interstitial markings. Ucx - > 100k klebsiella, 20-29k enterococcus, Bcx no growth, peritoneal fluid culture - no growth  Hypotensive to 83/48 with MAP of 58, started on pressors  Suspect hypovolemic shock 2/2 sepsis and decompensated cirrhosis causing third-spacing and intravascular volume depletion despite total body volume up. Treated with ampicillin until night prior to presentation  Bedside paracentesis performed 11/18, with 4L output. Yellow and turbid. Sent out for white count and cx  VS stable, though pt remains hypotensive with labile BPs and tachycardic  MAP goal changed to 60, started on midodrine 10 mg TID, levophed down to 1 mcg/min  Blood cx negative after 4 days  WBCs decreased after abx initiation, but has started to increase again over the last few days, with today's at 9.36  Pt had a GCS of 6 overnight. Checked ammonia, VBG, and head CT, all of which were unremarkable. Changed to NPO d/t concerns regarding airway protection. GCS improved to 9. Ddx cefepime vs. underlying condition      Plan:  Day #7 Cefepime 2 g q8 for suspected nosocomial SBP, LLE cellulitis, and UTI  Continue albumin 12.5 g 25% q6h  Wean pressors as able, currently on Levo 1 mcg/min via PICC  Consider CT AP for further workup

## 2024-11-21 NOTE — ASSESSMENT & PLAN NOTE
Lab Results   Component Value Date    HGBA1C 6.7 (H) 10/23/2024       Recent Labs     11/20/24  1123 11/20/24  1507 11/20/24  2112 11/21/24  0708   POCGLU 226* 342* 333* 322*       Blood Sugar Average: Last 72 hrs:  (P) [id number]    BG elevated in the 300s, in part due to steroids  Patient has not eaten dinner for the last few days. AMS overnight and made NPO  Started on insulin lantus 10 units yesterday, advanced to 20 units in the AM today    Plan:  Insulin ss q6h  Adjust basal bolus insulin and mealtime insulin as appropriate

## 2024-11-21 NOTE — ASSESSMENT & PLAN NOTE
Cirrhosis secondary to alcohol abuse, decompensation likely from septic shock  Patient seen in the emergency department on November 12 recurrent ascites where paracentesis was performed and he was discharged home -5 L of fluid removed at that time.  Patient declined admission during that visit.  Return to the emergency department 3 days later with lower extremity swelling found to have cellulitis and septic shock -paracentesis performed on admission  Fluid analysis from November 15 show ascitic fluid neutrophils of 239, no bacteria growth  Fluid now reaccumulating in the abdomen causing discomfort, physical exam shows mildly tender and largely distended abdomen with positive fluid wave shift, patient is AAO x 4   MELD 3.0: 20 at 11/21/2024  4:44 AM  MELD-Na: 21 at 11/21/2024  4:44 AM  Calculated from:  Serum Creatinine: 1.07 mg/dL at 11/21/2024  4:44 AM  Serum Sodium: 142 mmol/L (Using max of 137 mmol/L) at 11/21/2024  4:44 AM  Total Bilirubin: 3.39 mg/dL at 11/21/2024  4:44 AM  Serum Albumin: 3.4 g/dL at 11/21/2024  4:44 AM  INR(ratio): 2.21 at 11/21/2024  4:44 AM  Age at listing (hypothetical): 69 years  Sex: Male at 11/21/2024  4:44 AM  S/p paracentesis 11/18 - 4L fluid removed, fluid analysis does not meet criteria for SBP.      Abdominal exam similar to yesterday.  Patient remains lethargic and minimally responsive to questions and commands.    Patient now off of pressor support however blood pressure still soft, continue to hold Aldactone  Continue albumin repletion, especially with paracentesis > 5L  Alcohol cessation, low-salt diet, fluid restriction  Continue Bumex 1 mg twice daily, wait for further hemodynamic stability until incorporation of Aldactone  Continue to titrate lactulose for patient to have 3-4 loose bowel movements daily, continue rifaximin 550 mg twice daily  Monitor MELD labs

## 2024-11-21 NOTE — ASSESSMENT & PLAN NOTE
Sepsis in the setting of lower extremity cellulitis and Klebsiella and +/- Enterococcus faecalis UTI  Maintained on cefepime 2 g every 8 hours, day #7  Blood Pressure: 100/66, now off of Levophed, on midodrine 10 mg 3 times daily  Continue antibiotics and pressors per primary team  Agree with holding Aldactone, continue Bumex 1 mg twice daily for management of ascites  Trend CBC and CMP  Monitor daily MELD labs

## 2024-11-21 NOTE — PLAN OF CARE
Problem: Potential for Falls  Goal: Patient will remain free of falls  Description: INTERVENTIONS:  - Educate patient/family on patient safety including physical limitations  - Instruct patient to call for assistance with activity   - Consult OT/PT to assist with strengthening/mobility   - Keep Call bell within reach  - Keep bed low and locked with side rails adjusted as appropriate  - Keep care items and personal belongings within reach  - Initiate and maintain comfort rounds  - Make Fall Risk Sign visible to staff  - Offer Toileting every  Hours, in advance of need  - Initiate/Maintain alarm  - Obtain necessary fall risk management equipment:   - Apply yellow socks and bracelet for high fall risk patients  - Consider moving patient to room near nurses station  Outcome: Progressing     Problem: Prexisting or High Potential for Compromised Skin Integrity  Goal: Skin integrity is maintained or improved  Description: INTERVENTIONS:  - Identify patients at risk for skin breakdown  - Assess and monitor skin integrity  - Assess and monitor nutrition and hydration status  - Monitor labs   - Assess for incontinence   - Turn and reposition patient  - Assist with mobility/ambulation  - Relieve pressure over bony prominences  - Avoid friction and shearing  - Provide appropriate hygiene as needed including keeping skin clean and dry  - Evaluate need for skin moisturizer/barrier cream  - Collaborate with interdisciplinary team   - Patient/family teaching  - Consider wound care consult   Outcome: Progressing     Problem: PAIN - ADULT  Goal: Verbalizes/displays adequate comfort level or baseline comfort level  Description: Interventions:  - Encourage patient to monitor pain and request assistance  - Assess pain using appropriate pain scale  - Administer analgesics based on type and severity of pain and evaluate response  - Implement non-pharmacological measures as appropriate and evaluate response  - Consider cultural and  social influences on pain and pain management  - Notify physician/advanced practitioner if interventions unsuccessful or patient reports new pain  Outcome: Progressing     Problem: INFECTION - ADULT  Goal: Absence or prevention of progression during hospitalization  Description: INTERVENTIONS:  - Assess and monitor for signs and symptoms of infection  - Monitor lab/diagnostic results  - Monitor all insertion sites, i.e. indwelling lines, tubes, and drains  - Monitor endotracheal if appropriate and nasal secretions for changes in amount and color  - [place] appropriate cooling/warming therapies per order  - Administer medications as ordered  - Instruct and encourage patient and family to use good hand hygiene technique  - Identify and instruct in appropriate isolation precautions for identified infection/condition  Outcome: Progressing  Goal: Absence of fever/infection during neutropenic period  Description: INTERVENTIONS:  - Monitor WBC    Outcome: Progressing     Problem: SAFETY ADULT  Goal: Patient will remain free of falls  Description: INTERVENTIONS:  - Educate patient/family on patient safety including physical limitations  - Instruct patient to call for assistance with activity   - Consult OT/PT to assist with strengthening/mobility   - Keep Call bell within reach  - Keep bed low and locked with side rails adjusted as appropriate  - Keep care items and personal belongings within reach  - Initiate and maintain comfort rounds  - Make Fall Risk Sign visible to staff  - Offer Toileting every  Hours, in advance of need  - Initiate/Maintain alarm  - Obtain necessary fall risk management equipment:   - Apply yellow socks and bracelet for high fall risk patients  - Consider moving patient to room near nurses station  Outcome: Progressing  Goal: Maintain or return to baseline ADL function  Description: INTERVENTIONS:  -  Assess patient's ability to carry out ADLs; assess patient's baseline for ADL function and  identify physical deficits which impact ability to perform ADLs (bathing, care of mouth/teeth, toileting, grooming, dressing, etc.)  - Assess/evaluate cause of self-care deficits   - Assess range of motion  - Assess patient's mobility; develop plan if impaired  - Assess patient's need for assistive devices and provide as appropriate  - Encourage maximum independence but intervene and supervise when necessary  - Involve family in performance of ADLs  - Assess for home care needs following discharge   - Consider OT consult to assist with ADL evaluation and planning for discharge  - Provide patient education as appropriate  Outcome: Progressing  Goal: Maintains/Returns to pre admission functional level  Description: INTERVENTIONS:  - Perform AM-PAC 6 Click Basic Mobility/ Daily Activity assessment daily.  - Set and communicate daily mobility goal to care team and patient/family/caregiver.   - Collaborate with rehabilitation services on mobility goals if consulted  - Perform Range of Motion  times a day.  - Reposition patient every  hours.  - Dangle patient  times a day  - Stand patient  times a day  - Ambulate patient  times a day  - Out of bed to chair  times a day   - Out of bed for meals times a day  - Out of bed for toileting  - Record patient progress and toleration of activity level   Outcome: Progressing     Problem: DISCHARGE PLANNING  Goal: Discharge to home or other facility with appropriate resources  Description: INTERVENTIONS:  - Identify barriers to discharge w/patient and caregiver  - Arrange for needed discharge resources and transportation as appropriate  - Identify discharge learning needs (meds, wound care, etc.)  - Arrange for interpretive services to assist at discharge as needed  - Refer to Case Management Department for coordinating discharge planning if the patient needs post-hospital services based on physician/advanced practitioner order or complex needs related to functional status, cognitive  ability, or social support system  Outcome: Progressing     Problem: Knowledge Deficit  Goal: Patient/family/caregiver demonstrates understanding of disease process, treatment plan, medications, and discharge instructions  Description: Complete learning assessment and assess knowledge base.  Interventions:  - Provide teaching at level of understanding  - Provide teaching via preferred learning methods  Outcome: Progressing     Problem: Nutrition/Hydration-ADULT  Goal: Nutrient/Hydration intake appropriate for improving, restoring or maintaining nutritional needs  Description: Monitor and assess patient's nutrition/hydration status for malnutrition. Collaborate with interdisciplinary team and initiate plan and interventions as ordered.  Monitor patient's weight and dietary intake as ordered or per policy. Utilize nutrition screening tool and intervene as necessary. Determine patient's food preferences and provide high-protein, high-caloric foods as appropriate.     INTERVENTIONS:  - Monitor oral intake, urinary output, labs, and treatment plans  - Assess nutrition and hydration status and recommend course of action  - Evaluate amount of meals eaten  - Assist patient with eating if necessary   - Allow adequate time for meals  - Recommend/ encourage appropriate diets, oral nutritional supplements, and vitamin/mineral supplements  - Order, calculate, and assess calorie counts as needed  - Recommend, monitor, and adjust tube feedings and TPN/PPN based on assessed needs  - Assess need for intravenous fluids  - Provide specific nutrition/hydration education as appropriate  - Include patient/family/caregiver in decisions related to nutrition  Outcome: Progressing

## 2024-11-21 NOTE — ASSESSMENT & PLAN NOTE
Lab Results   Component Value Date    HGBA1C 6.7 (H) 10/23/2024       Recent Labs     11/20/24  1507 11/20/24  2112 11/21/24  0708 11/21/24  1110   POCGLU 342* 333* 322* 304*       Blood Sugar Average: Last 72 hrs:  (P) [id number]    On insulin gtt, management per primary team

## 2024-11-21 NOTE — ASSESSMENT & PLAN NOTE
Managed by hematology/oncology at [facility] as recent as 10/1/2024  Plan was to proceed with TACE however no feasible access to vessels and the procedure was aborted  After receiving immunotherapy with tremelimumab/durvalumab, currently on hold in the setting of acute illness  Viability of liver transplant to be determined  Will need to inquire about timing of resuming immunotherapy and continuity of care with local oncologist  Palliative care consulted, dexamethasone started  Goals of care conversations are underway

## 2024-11-21 NOTE — PHYSICAL THERAPY NOTE
Physical Therapy Cancellation Note     11/21/24 1017   Note Type   Note type Cancelled Session   Cancel Reasons Medical status   Additional Comments referral received for PT eval and tx. Mono BALL reports pt is not able to communicate and is not appropriate for PT eval. will follow and initiate PT as medically appropriate and schedule allows.     Raad Lilly, PT

## 2024-11-21 NOTE — PROGRESS NOTES
Progress Note - Critical Care/ICU   Name: Isael Avendano 69 y.o. male I MRN: [id number]  Unit/Bed#: ICU 01 I Date of Admission: 11/15/2024   Date of Service: 11/21/2024 I Hospital Day: 6      Assessment & Plan  Shock (HCC)  Significant for decompensated cirrhosis, ?SBP, cellulitis  Lactic acid initially 3.0, normalized to 1.9 on 11/15  Bilateral lower extremity 2+ pitting edema, with more tenderness and erythema in the LLE, though warmth is similar bilaterally   WBCs 10.04 on admission from baseline of 2-3, 7.64 today with neutrophilic predominance  CXR - increased interstitial markings. Ucx - > 100k klebsiella, 20-29k enterococcus, Bcx no growth, peritoneal fluid culture - no growth  Hypotensive to 83/48 with MAP of 58, started on pressors  Suspect hypovolemic shock 2/2 sepsis and decompensated cirrhosis causing third-spacing and intravascular volume depletion despite total body volume up. Treated with ampicillin until night prior to presentation  Bedside paracentesis performed 11/18, with 4L output. Yellow and turbid. Sent out for white count and cx  VS stable, though pt remains hypotensive with labile BPs and tachycardic  MAP goal changed to 60, started on midodrine 10 mg TID, levophed down to 1 mcg/min  Blood cx negative after 4 days  WBCs decreased after abx initiation, but has started to increase again over the last few days, with today's at 9.36  Pt had a GCS of 6 overnight. Checked ammonia, VBG, and head CT, all of which were unremarkable. Changed to NPO d/t concerns regarding airway protection. GCS improved to 9. Ddx cefepime vs. underlying condition      Plan:  Day #7 Cefepime 2 g q8 for suspected nosocomial SBP, LLE cellulitis, and UTI  Continue albumin 12.5 g 25% q6h  Wean pressors as able, currently on Levo 1 mcg/min via PICC  Consider CT AP for further workup   Decompensated cirrhosis (HCC)  HCC, prior history of heavy etoh use. MELD 25  Abdominal distention, tense until yesterday. Since paracentesis on  11/18, soft and mildly distended  Prior history of SBP, not on prophylaxis  On examination, abdomen is soft and nontender to palpation  Pt does not appear to be in any pain or discomfort on palpation of abdomen  PO lactulose changed to lactulose enemas q4h since pt was made NPO    Plan:  Daily MELD labs  Continue lactulose 200g enemas q4h  NPO until patient is alert enough to protect airway and swallow  Resume diuretics once pt off pressors per GI  Discharge with nonselective beta blocker  Diabetes (HCC)  Lab Results   Component Value Date    HGBA1C 6.7 (H) 10/23/2024       Recent Labs     11/20/24  1123 11/20/24  1507 11/20/24  2112 11/21/24  0708   POCGLU 226* 342* 333* 322*       Blood Sugar Average: Last 72 hrs:  (P) [id number]    BG elevated in the 300s, in part due to steroids  Patient has not eaten dinner for the last few days. AMS overnight and made NPO  Started on insulin lantus 10 units yesterday, advanced to 20 units in the AM today    Plan:  Insulin ss q6h  Adjust basal bolus insulin and mealtime insulin as appropriate  Pancytopenia (HCC)  Chronic, stable  Pt presented with a leukocytosis of 10 (baseline of 2-3), decreased after starting IV abx, but increasing over the last few days, with today's at 9.36  Platelets stable   Worked up previously, secondary to hypersplenism.  SBP (spontaneous bacterial peritonitis) (HCC)  As noted under shock  Cellulitis of left lower extremity  As noted under shock  Moderate protein-calorie malnutrition (HCC)  Malnutrition Findings:   Adult Malnutrition type: Acute illness  Adult Degree of Malnutrition: Malnutrition of moderate degree  Malnutrition Characteristics: Inadequate energy, Other (comment) (edema, ascited, skin breakdown)                  360 Statement: Malnutrition of moderate degree in the context of chronic illness as evidenced by edema, ascites, inadequate energy intake, skin breadown r/t liver disease. Treated with oral diet and will supplement  with high kcal and protein supplement.    BMI Findings:           Body mass index is 30.53 kg/m².     Stage 3a chronic kidney disease (HCC)  Lab Results   Component Value Date    EGFR 69 11/21/2024    EGFR 66 11/20/2024    EGFR 72 11/20/2024    CREATININE 1.07 11/21/2024    CREATININE 1.11 11/20/2024    CREATININE 1.03 11/20/2024     Hepatocellular carcinoma (HCC)  PICC line in place, receiving immunotherapy at [place]  UTI (urinary tract infection)  Urine cx positive for >100k klebsiella and 20-29k enterococcus faecalis    Plan:  Continue cefepime 2g q8h  Varices, esophageal (HCC)  Plan to discharge with nonselective beta-blocker for ppx  Palliative care by specialist    Counseling regarding advance care planning and goals of care    Cancer related pain  Oxycodone 5 mg q6h PRN for moderate to severe pain and dilaudid 0.2 mg IV q6h PRN for breakthrough pain  11/20 Given dexamethasone 4 mg IV. Started on dexamethasone 2 mg IV BID. Unclear if there has been benefit    Plan:  Continue pain control with oxycodone and dilaudid  Continue dexamethasone 2 mg IV BID  Consider CT AP for further workup of possible metastatic peritoneal seeding  Disposition: Critical care    ICU Core Measures     A: Assess, Prevent, and Manage Pain Has pain been assessed? Yes  Need for changes to pain regimen? No   B: Both SAT/SAT  N/A   C: Choice of Sedation RASS Goal: -3 Moderate Sedation  Need for changes to sedation or analgesia regimen? No   D: Delirium CAM-ICU: Positive   E: Early Mobility  Plan for early mobility? Yes   F: Family Engagement Plan for family engagement today? Yes       Antibiotic Review: Continue broad spectrum secondary to severity of illness.     Review of Invasive Devices:    [place] Plan: Continue for accurate I/O monitoring for 48 hours  Central access plan: Patient requires PICC secondary to immunotherapy      Prophylaxis:  VTE VTE covered by:  heparin (porcine), Subcutaneous, 5,000 Units at 11/21/24 0503       Stress  Ulcer  not orderedcovered bypantoprazole (PROTONIX) 40 mg tablet [[id number]] (Long-Term Med)         24 Hour Events : Able to wean pressors to levophed 1 mcg/min. Given 5 lopressor for afib with RVR. Pt was very lethargic overnight with a GCS of 6. VBG, ammonia, and CT head ordered, all of which were unremarkable for AMS. Made NPO d/t concerns about airway protection.   Subjective   Review of Systems: Review of Systems not obtainable due to Altered mental status    Objective :                   Vitals I/O      Most Recent Min/Max in 24hrs   Temp 98.7 °F (37.1 °C) Temp  Min: 98 °F (36.7 °C)  Max: 99.1 °F (37.3 °C)   Pulse (!) 136 Pulse  Min: 113  Max: 137   Resp 20 Resp  Min: 12  Max: 34   /59 BP  Min: 76/50  Max: 161/60   O2 Sat 97 % SpO2  Min: 91 %  Max: 100 %      Intake/Output Summary (Last 24 hours) at 2024 0728  Last data filed at 2024 0600  Gross per 24 hour   Intake 1116.72 ml   Output 2025 ml   Net -908.28 ml       Diet NPO    Invasive Monitoring           Physical Exam   Physical Exam  Skin:     General: Skin is warm and dry.   HENT:      Head: Normocephalic and atraumatic.      Mouth/Throat:      Mouth: Mucous membranes are dry.   Cardiovascular:      Rate and Rhythm: Tachycardia present. Rhythm irregular.      Heart sounds: Normal heart sounds.   Musculoskeletal:      Right lower le+ Edema present.      Left lower le+ Edema present.   Abdominal: General: Bowel sounds are normal. There is no distension.      Palpations: Abdomen is soft.      Tenderness: There is no abdominal tenderness.   Constitutional:       Appearance: He is well-developed and well-nourished. He is ill-appearing.   Pulmonary:      Effort: Pulmonary effort is normal. No accessory muscle usage, respiratory distress or accessory muscle usage.   Neurological:      Mental Status: He is lethargic and CAM ICU positive .   Genitourinary/Anorectal:  [place] present.        Diagnostic Studies        Lab Results: I have  reviewed the following results:     Medications:  Scheduled PRN   Albumin 25%, 12.5 g, Q6H  bumetanide, 1 mg, BID  cefepime, 2,000 mg, Q8H  chlorhexidine, 15 mL, Q12H LEEANNE  dexamethasone, 2 mg, BID  heparin (porcine), 5,000 Units, Q8H LEEANNE  insulin glargine, 20 Units, QAM  insulin lispro, 1-5 Units, HS  insulin lispro, 1-6 Units, TID AC  lactulose, 200 g, Q4H  midodrine, 10 mg, Q8H  MEIR ANTIFUNGAL, , BID  rifaximin, 550 mg, Q12H LEEANNE      HYDROmorphone, 0.2 mg, Q6H PRN  oxyCODONE, 5 mg, Q6H PRN       Continuous    norepinephrine, 1-30 mcg/min, Last Rate: 1 mcg/min (11/21/24 0442)         Labs:   CBC    Recent Labs     11/20/24 2323 11/21/24 0444   WBC 7.77 9.36   HGB 7.0* 7.8*   HCT 23.3* 26.5*   PLT 59* 71*     BMP    Recent Labs     11/20/24 2323 11/21/24 0444   SODIUM 141 142   K 5.3 5.4*   * 112*   CO2 21 22   AGAP 8 8   BUN 48* 50*   CREATININE 1.11 1.07   CALCIUM 9.7 9.8       Coags    Recent Labs     11/20/24 2323 11/21/24 0444   INR 2.30* 2.21*        Additional Electrolytes  Recent Labs     11/20/24 2323 11/21/24 0444   MG 2.3 2.3   PHOS 2.6 2.7   CAIONIZED 1.24 1.21          Blood Gas    No recent results  Recent Labs     11/20/24 2122   PHVEN 7.455*   [id number] 33.7*   PO2VEN 48.7*   ZTA0KKL 23.2*   BEVEN -0.5   [id number] 80.6*    LFTs  Recent Labs     11/20/24 2323 11/21/24 0444   ALT 22 22   AST 27 25   ALKPHOS 189* 181*   ALB 3.2* 3.4*   TBILI 3.52* 3.39*       Infectious  No recent results  Glucose  Recent Labs     11/19/24  0747 11/20/24  0356 11/20/24 2323 11/21/24  0444   GLUC 96 213* 365* 349*

## 2024-11-21 NOTE — PROGRESS NOTES
Progress Note - Gastroenterology   Name: Isael Avendano 69 y.o. male I MRN: [id number]  Unit/Bed#: ICU 01 I Date of Admission: 11/15/2024   Date of Service: 11/21/2024 I Hospital Day: 6    Assessment & Plan  Decompensated cirrhosis (HCC)  Cirrhosis secondary to alcohol abuse, decompensation likely from septic shock  Patient seen in the emergency department on November 12 recurrent ascites where paracentesis was performed and he was discharged home -5 L of fluid removed at that time.  Patient declined admission during that visit.  Return to the emergency department 3 days later with lower extremity swelling found to have cellulitis and septic shock -paracentesis performed on admission  Fluid analysis from November 15 show ascitic fluid neutrophils of 239, no bacteria growth  Fluid now reaccumulating in the abdomen causing discomfort, physical exam shows mildly tender and largely distended abdomen with positive fluid wave shift, patient is AAO x 4   MELD 3.0: 20 at 11/21/2024  4:44 AM  MELD-Na: 21 at 11/21/2024  4:44 AM  Calculated from:  Serum Creatinine: 1.07 mg/dL at 11/21/2024  4:44 AM  Serum Sodium: 142 mmol/L (Using max of 137 mmol/L) at 11/21/2024  4:44 AM  Total Bilirubin: 3.39 mg/dL at 11/21/2024  4:44 AM  Serum Albumin: 3.4 g/dL at 11/21/2024  4:44 AM  INR(ratio): 2.21 at 11/21/2024  4:44 AM  Age at listing (hypothetical): 69 years  Sex: Male at 11/21/2024  4:44 AM  S/p paracentesis 11/18 - 4L fluid removed, fluid analysis does not meet criteria for SBP.      Abdominal exam similar to yesterday.  Patient remains lethargic and minimally responsive to questions and commands.    Patient now off of pressor support however blood pressure still soft, continue to hold Aldactone  Continue albumin repletion, especially with paracentesis > 5L  Alcohol cessation, low-salt diet, fluid restriction  Continue Bumex 1 mg twice daily, wait for further hemodynamic stability until incorporation of Aldactone  Continue to titrate  lactulose for patient to have 3-4 loose bowel movements daily, continue rifaximin 550 mg twice daily  Monitor MELD labs  Hepatocellular carcinoma (HCC)  Managed by hematology/oncology at [facility] as recent as 10/1/2024  Plan was to proceed with TACE however no feasible access to vessels and the procedure was aborted  After receiving immunotherapy with tremelimumab/durvalumab, currently on hold in the setting of acute illness  Viability of liver transplant to be determined  Will need to inquire about timing of resuming immunotherapy and continuity of care with local oncologist  Palliative care consulted, dexamethasone started  Goals of care conversations are underway  Pancytopenia (HCC)  Chronic, stable  Previous workups show etiology is a secondary of splenic sequestration  Recent Labs     11/20/24  0356 11/20/24  2323 11/21/24  0444   WBC 8.51 7.77 9.36     Recent Labs     11/20/24  0356 11/20/24 2323 11/21/24  0444   PLT 62* 59* 71*     Recent Labs     11/20/24  0356 11/20/24 2323 11/21/24  0444   HGB 8.0* 7.0* 7.8*     Continue to monitor CBC and signs of bleeding  Varices, esophageal (HCC)  EGD on 8/15 shows two tortuous varices in the upper third of the esophagus  Currently stable and without evidence of bleeding  Trend CBC and monitor s/s of variceal bleeding  Would recommend nonselective beta-blocker for prophylaxis once stabilized  Shock (HCC)  Sepsis in the setting of lower extremity cellulitis and Klebsiella and +/- Enterococcus faecalis UTI  Maintained on cefepime 2 g every 8 hours, day #7  Blood Pressure: 100/66, now off of Levophed, on midodrine 10 mg 3 times daily  Continue antibiotics and pressors per primary team  Agree with holding Aldactone, continue Bumex 1 mg twice daily for management of ascites  Trend CBC and CMP  Monitor daily MELD labs  Diabetes (HCC)  Lab Results   Component Value Date    HGBA1C 6.7 (H) 10/23/2024       Recent Labs     11/20/24  1507 11/20/24 2112  11/21/24  0708 11/21/24  1110   POCGLU 342* 333* 322* 304*       Blood Sugar Average: Last 72 hrs:  (P) 205.4321345780380893    On insulin gtt, management per primary team  Stage 3a chronic kidney disease (HCC)  Lab Results   Component Value Date    EGFR 69 11/21/2024    EGFR 66 11/20/2024    EGFR 72 11/20/2024    CREATININE 1.07 11/21/2024    CREATININE 1.11 11/20/2024    CREATININE 1.03 11/20/2024     Cellulitis of left lower extremity  POA, contributing to septic shock  Receiving IV cefepime, Day #7  UTI (urinary tract infection)  Cultures positive for greater than 100,000 CFU per milliliter Klebsiella pneumonia, 20-30,000 CFU per milliliter Enterococcus faecalis  On IV cefepime, day #7  Moderate protein-calorie malnutrition (HCC)  Malnutrition Findings:   Adult Malnutrition type: Acute illness  Adult Degree of Malnutrition: Malnutrition of moderate degree  Malnutrition Characteristics: Inadequate energy, Other (comment) (edema, ascited, skin breakdown)                  360 Statement: Malnutrition of moderate degree in the context of chronic illness as evidenced by edema, ascites, inadequate energy intake, skin breadown r/t liver disease. Treated with oral diet and will supplement with high kcal and protein supplement.    BMI Findings:           Body mass index is 30.53 kg/m².           Subjective   No subjective data given patient's encephalopathy    Objective :  Temp:  [98.6 °F (37 °C)-99.1 °F (37.3 °C)] 98.7 °F (37.1 °C)  HR:  [113-140] 134  BP: ()/(48-86) 100/66  Resp:  [12-34] 18  SpO2:  [91 %-98 %] 98 %  O2 Device: None (Room air)    Physical Exam  Vitals and nursing note reviewed.   Constitutional:       Appearance: He is ill-appearing.   HENT:      Head: Normocephalic.      Mouth/Throat:      Mouth: Mucous membranes are moist.      Pharynx: Oropharynx is clear. No oropharyngeal exudate.   Eyes:      Extraocular Movements: Extraocular movements intact.      Pupils: Pupils are equal, round, and  reactive to light.   Cardiovascular:      Rate and Rhythm: Tachycardia present.      Pulses: Normal pulses.      Heart sounds: Normal heart sounds.   Pulmonary:      Effort: Pulmonary effort is normal.      Breath sounds: Normal breath sounds.   Abdominal:      General: There is distension.      Comments: Abdomen about as tense and distended as yesterday   Musculoskeletal:      Cervical back: Normal range of motion.      Right lower leg: Edema present.      Left lower leg: Edema present.      Comments: Upper extremity edema improved   Neurological:      General: No focal deficit present.      Mental Status: He is alert.      Comments: Mild asterixis present  Minimally responsive to questions and commands today, difficult to arouse             Lab Results: I have reviewed the following results:

## 2024-11-21 NOTE — TREATMENT PLAN
Called and spoke with spouse Kaylah on the phone. Family is ready to transition to hospice. Put in for hospice liaison consultation and will reach out to the hospice liaison team in the AM.  Please continue current cares and keep patient stable and optimized as transition is planned.    For any questions or concerns, please reach out to us on [employer] or at [phone].

## 2024-11-21 NOTE — ASSESSMENT & PLAN NOTE
Cultures positive for greater than 100,000 CFU per milliliter Klebsiella pneumonia, 20-30,000 CFU per milliliter Enterococcus faecalis  On IV cefepime, day #7

## 2024-11-21 NOTE — ASSESSMENT & PLAN NOTE
Oxycodone 5 mg q6h PRN for moderate to severe pain and dilaudid 0.2 mg IV q6h PRN for breakthrough pain  11/20 Given dexamethasone 4 mg IV. Started on dexamethasone 2 mg IV BID. Unclear if there has been benefit    Plan:  Continue pain control with oxycodone and dilaudid  Continue dexamethasone 2 mg IV BID  Consider CT AP for further workup of possible metastatic peritoneal seeding

## 2024-11-21 NOTE — ASSESSMENT & PLAN NOTE
HCC, prior history of heavy etoh use. MELD 25  Abdominal distention, tense until yesterday. Since paracentesis on 11/18, soft and mildly distended  Prior history of SBP, not on prophylaxis  On examination, abdomen is soft and nontender to palpation  Pt does not appear to be in any pain or discomfort on palpation of abdomen  PO lactulose changed to lactulose enemas q4h since pt was made NPO    Plan:  Daily MELD labs  Continue lactulose 200g enemas q4h  NPO until patient is alert enough to protect airway and swallow  Resume diuretics once pt off pressors per GI  Discharge with nonselective beta blocker

## 2024-11-21 NOTE — OCCUPATIONAL THERAPY NOTE
Occupational Therapy Cancelled Session  Patient Name: Isael Avendano  Today's Date: 11/21/2024 11/21/24 1302   Note Type   Note type Cancelled Session   Cancel Reasons Medical status   Additional Comments OT orders received and chart review performed. Per ICU mobility rounds, pt is currently lethargic, alerted and not appropriate for participation in therapy. Will hold and f/u as medically appropriate.     Madeleine Ziegler, DANYELLD, OTR/L  [place] License [id number]  [place] License [id number]

## 2024-11-21 NOTE — ASSESSMENT & PLAN NOTE
EGD on 8/15 shows two tortuous varices in the upper third of the esophagus  Currently stable and without evidence of bleeding  Trend CBC and monitor s/s of variceal bleeding  Would recommend nonselective beta-blocker for prophylaxis once stabilized

## 2024-11-21 NOTE — ASSESSMENT & PLAN NOTE
Chronic, stable  Pt presented with a leukocytosis of 10 (baseline of 2-3), decreased after starting IV abx, but increasing over the last few days, with today's at 9.36  Platelets stable   Worked up previously, secondary to hypersplenism.

## 2024-11-21 NOTE — PROGRESS NOTES
Progress Note - Palliative Care   Name: Isael Avendano 69 y.o. male I MRN: [id number]  Unit/Bed#: ICU 01 I Date of Admission: 11/15/2024   Date of Service: 11/21/2024 I Hospital Day: 6     Assessment & Plan  Shock (HCC)  In setting cellulitis vs UTI  Microbiology showing urine culture with Klebsiella pneumonia and Enterococcus faecalis  On supportive abx and vasopressors with CC team  Hepatocellular carcinoma (HCC)  Diagnosed 6/2024  Follows with [facility] oncology  S/p Tremelimumab/Durvalumab c/b Listeria infection and SBP  Cancer related pain  Concern patient is having a significant amount of abdominal pain from tumor burden  Also concerned about possible peritoneal seeding from malignancy    Will start dexamethasone medication---> 4 mg IV dose once today followed by 2 mg twice daily tomorrow; reviewed risks/benefits and mechanism of action with patient's spouse    Continue oxycodone IR 5 mg q6h prn  Continue Dilaudid 0.5mg IV q6h prn for breakthrough pain if unable to tolerate PO  Diabetes (HCC)  Lab Results   Component Value Date    HGBA1C 6.7 (H) 10/23/2024       Recent Labs     11/20/24  1123 11/20/24  1507 11/20/24  2112 11/21/24  0708   POCGLU 226* 342* 333* 322*       Blood Sugar Average: Last 72 hrs:  (P) 200.7709969446326470    Monitor serum glucose while on dexamethasone medication  Stage 3a chronic kidney disease (HCC)  Lab Results   Component Value Date    EGFR 69 11/21/2024    EGFR 66 11/20/2024    EGFR 72 11/20/2024    CREATININE 1.07 11/21/2024    CREATININE 1.11 11/20/2024    CREATININE 1.03 11/20/2024     Decompensated cirrhosis (HCC)  MELD-Na 19 11/20/2024  Evaluated for transplant at [facility]-->not eligible  Likely hepatic encephalopathy  Hx of varices and recurrent ascites; hx of SBP-->would recommend drainage catheter to be placed by IR this admission if patient goes on to hospice  S/p paracentesis this admission  Gastroenterology is following  Moderate protein-calorie malnutrition (HCC)  Malnutrition  Findings:   Adult Malnutrition type: Acute illness  Adult Degree of Malnutrition: Malnutrition of moderate degree  Malnutrition Characteristics: Inadequate energy, Other (comment) (edema, ascited, skin breakdown)                  360 Statement: Malnutrition of moderate degree in the context of chronic illness as evidenced by edema, ascites, inadequate energy intake, skin breadown r/t liver disease. Treated with oral diet and will supplement with high kcal and protein supplement.    BMI Findings:           Body mass index is 30.53 kg/m².     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  We reviewed overall clinical course and current concerns of continued decline despite all treatments.  Reviewed prior treatments with [facility] oncology and future plans for treatment.  Although we expressed hope that he would respond to current treatments, concerns were brought about continued decline and that even if he were to become more lucid and improve, that he would likely be bedbound and dependent for his ADLs, necessitating discharge to rehab facility.  Family agreed that patient would not want to go to a facility.  We spoke about his current compounding diagnoses of decompensated cirrhosis, infection, and hepatocellular  carcinoma.  Education and counseling was provided on comfort based care/hospice cares, including philosophy, medication purposes, team make-up, and levels of care. Advised on prognosis of days to weeks while on hospice.     Patient's family would like to think further about their different options.  We will remain available for any kind of support and assistance.     Code Status: DNAR/DNI - Level 3   Decisional apparatus:  Patient is not competent on my exam today.  If competence is lost, patient's substitute decision maker would default to spouse by [place] Act 169.   Advance Directive / Living Will / POLST:  Reportedly POA document; unable to view      NARRATIVE AND INTERVAL HISTORY:  In the last 24 hours, the ICU team was able to wean down on his vasopressor medication.  Moreover, he is on his last day of antibiotic treatment.  Has not received the pain medication since yesterday 11/20 at approximately 1500 hrs.    Patient is encephalopathic this morning and unable to purposefully communicate.  Patient's spouse and 2 daughters are present this morning.  We coordinated a family meeting.     MEDICATIONS / ALLERGIES:     all current active meds have been reviewed, current meds:   Current Facility-Administered Medications:     albumin human (FLEXBUMIN) 25 % injection 25 g, Q8H    bumetanide (BUMEX) injection 1 mg, BID    ceFEPime (MAXIPIME) 2,000 mg in dextrose 5 % 50 mL IVPB, Q8H, Last Rate: 2,000 mg (11/21/24 0256)    chlorhexidine (PERIDEX) 0.12 % oral rinse 15 mL, Q12H LEEANNE    dexamethasone (DECADRON) injection 2 mg, BID    heparin (porcine) subcutaneous injection 5,000 Units, Q8H LEEANNE    HYDROmorphone HCl (DILAUDID) injection 0.2 mg, Q6H PRN    insulin glargine (LANTUS) subcutaneous injection 40 Units 0.4 mL, QAM    insulin lispro (HumALOG/ADMELOG) 100 units/mL subcutaneous injection 2-12 Units, Q6H LEEANNE **AND** Fingerstick Glucose (POCT), Q6H    lactulose retention enema 200 g, Q4H    midodrine (PROAMATINE) tablet 10  mg, Q8H    moisture barrier miconazole 2% cream (aka MEIR MOISTURE BARRIER ANTIFUNGAL CREAM), BID    NOREPINEPHRINE 4 MG  ML NSS (CMPD ORDER) infusion, Titrated, Last Rate: Stopped (11/21/24 0900)    oxyCODONE (ROXICODONE) IR tablet 5 mg, Q6H PRN    rifaximin (XIFAXAN) tablet 550 mg, Q12H LEEANNE, and PTA meds:   Prior to Admission Medications   Prescriptions Last Dose Informant Patient Reported? Taking?   Insulin Pen Needle (BD Pen Needle Nayla 2nd Gen) 32G X 4 MM MISC  Self No No   Sig: For use with insulin pen. Pharmacy may dispense brand covered by insurance.   MILK THISTLE PO  Self Yes No   Sig: Take 1 tablet by mouth 2 (two) times a day   Multiple Vitamin (multivitamin) capsule  Self No No   Sig: Take 1 capsule by mouth daily   bumetanide (BUMEX) 1 mg tablet  Self No No   Sig: Take 1 tablet (1 mg total) by mouth daily Do not start before February 29, 2024.   glimepiride (AMARYL) 4 mg tablet  Self Yes No   Sig: Take 4 mg by mouth 2 (two) times a day   Patient not taking: Reported on 9/23/2024   insulin glargine (LANTUS) 100 units/mL subcutaneous injection  Self Yes No   Sig: Inject 40 Units under the skin daily at bedtime   Patient not taking: Reported on 9/23/2024   insulin regular (HumuLIN R,NovoLIN R) 100 units/mL injection  Self Yes No   Sig: Inject 10 Units under the skin 3 (three) times a day with meals   Patient not taking: Reported on 9/23/2024   lactulose (CHRONULAC) 10 g/15 mL solution  Self Yes No   Sig: Take 20 g by mouth 2 (two) times a day   pantoprazole (PROTONIX) 40 mg tablet  Self No No   Sig: Take 1 tablet (40 mg total) by mouth daily   potassium chloride (Klor-Con M20) 20 mEq tablet  Self No No   Sig: Take 1 tablet (20 mEq total) by mouth daily Do not start before February 29, 2024.   Patient not taking: Reported on 9/23/2024   spironolactone (ALDACTONE) 50 mg tablet   Yes No   Sig: Take 50 mg by mouth daily   tamsulosin (FLOMAX) 0.4 mg  Self No No   Sig: Take 1 capsule (0.4 mg total) by  mouth daily with dinner   traZODone (DESYREL) 50 mg tablet  Self Yes No   Sig: Take 50 mg by mouth daily at bedtime bedtime      Facility-Administered Medications: None       Allergies   Allergen Reactions    Sulfa Antibiotics Hives    Daptomycin Other (See Comments)     Possibly contributed to ABILIO on 6/2023 admission        OBJECTIVE:    Physical Exam  Physical Exam  Constitutional:       General: He is not in acute distress.     Appearance: He is ill-appearing.      Interventions: Nasal cannula in place.   HENT:      Head: Normocephalic and atraumatic.      Right Ear: External ear normal.      Left Ear: External ear normal.      Nose: Nose normal.      Mouth/Throat:      Mouth: Mucous membranes are dry.      Pharynx: Oropharynx is clear.   Eyes:      General: Scleral icterus present.      Conjunctiva/sclera: Conjunctivae normal.   Cardiovascular:      Rate and Rhythm: Regular rhythm. Tachycardia present.      Pulses: Normal pulses.   Pulmonary:      Effort: Pulmonary effort is normal. No respiratory distress.   Abdominal:      General: There is no distension.      Palpations: Abdomen is soft.      Tenderness: There is no abdominal tenderness.   Musculoskeletal:      Right lower leg: Edema present.      Left lower leg: Edema present.   Skin:     Coloration: Skin is jaundiced and pale.   Neurological:      Mental Status: He is lethargic, disoriented and confused.      Motor: Tremor (whole body (asterixis)) present.   Psychiatric:      Comments: Unable to assess         Lab Results: I have personally reviewed pertinent labs., CBC:   Lab Results   Component Value Date    WBC 9.36 11/21/2024    HGB 7.8 (L) 11/21/2024    HCT 26.5 (L) 11/21/2024    MCV 84 11/21/2024    PLT 71 (L) 11/21/2024    RBC 3.14 (L) 11/21/2024    MCH 24.8 (L) 11/21/2024    MCHC 29.4 (L) 11/21/2024    RDW 22.5 (H) 11/21/2024    NRBC 0 11/21/2024   , CMP:   Lab Results   Component Value Date    SODIUM 142 11/21/2024    K 5.4 (H) 11/21/2024      "(H) 11/21/2024    CO2 22 11/21/2024    BUN 50 (H) 11/21/2024    CREATININE 1.07 11/21/2024    CALCIUM 9.8 11/21/2024    AST 25 11/21/2024    ALT 22 11/21/2024    ALKPHOS 181 (H) 11/21/2024    EGFR 69 11/21/2024   , BMP:  Lab Results   Component Value Date    SODIUM 142 11/21/2024    K 5.4 (H) 11/21/2024     (H) 11/21/2024    CO2 22 11/21/2024    BUN 50 (H) 11/21/2024    CREATININE 1.07 11/21/2024    GLUC 349 (H) 11/21/2024    CALCIUM 9.8 11/21/2024    AGAP 8 11/21/2024    EGFR 69 11/21/2024   , PT/PTT:No results found for: \"PT\", \"PTT\"  Imaging Studies: Reviewed and interpreted the pertinent studies  EKG, Pathology, and Other Studies: Reviewed and interpreted the pertinent studies    I have spent a total time of 60 minutes in caring for this patient on the day of the visit/encounter including Diagnostic results, Prognosis, Risks and benefits of tx options, Instructions for management, Patient and family education, Impressions, Counseling / Coordination of care, Documenting in the medical record, Reviewing / ordering tests, medicine, procedures  , Obtaining or reviewing history  , and Communicating with other healthcare professionals   "

## 2024-11-21 NOTE — PROGRESS NOTES
11/21/24 1600   Clinical Encounter Type   Visited With Patient and family together   Crisis Visit Critical Care   Referral From Nurse   Yazidism Encounters   Yazidism Needs Prayer  (Requested by family)   Patient Spiritual Encounters   Spiritual Encounter Notes Family state that Pt was fairly vigorous and active until recent CA diagnosis and even for a few months, thereafter.  Family exhibit some degree of shock at Pt's recent rapid decline.  Family seem accepting and state they do not want Pt to suffer but are not prepared for Pt's death, should that be in the immediate future.  Family seem very mutually supprtive.   Family Spiritual Encounters   Family Coping Accepting;Open/discussion;Sadness   Family Normalization 5   Family Participation in Care 5   Family Support During Treatment 5   Caregiver-Patient Relationship 5

## 2024-11-21 NOTE — ASSESSMENT & PLAN NOTE
Chronic, stable  Previous workups show etiology is a secondary of splenic sequestration  Recent Labs     11/20/24  0356 11/20/24  2323 11/21/24  0444   WBC 8.51 7.77 9.36     Recent Labs     11/20/24  0356 11/20/24 2323 11/21/24  0444   PLT 62* 59* 71*     Recent Labs     11/20/24  0356 11/20/24  2323 11/21/24  0444   HGB 8.0* 7.0* 7.8*     Continue to monitor CBC and signs of bleeding

## 2024-11-21 NOTE — TELEPHONE ENCOUNTER
Patient was seen in the hospital this morning by Dr. Hutchins and he mentioned possibly transitioning patient to [facility].    Daughter Shirlene is calling because the family has decided they would like to get patient transitioned over to [facility] as soon as possible to ensure his comfort.     She would appreciate a call back to her mother Kaylah at [phone] if any questions or additional information needed.

## 2024-11-22 ENCOUNTER — HOME CARE VISIT (OUTPATIENT)
Dept: HOME HEALTH SERVICES | Facility: HOME HEALTHCARE | Age: 69
End: 2024-11-22
Payer: MEDICARE

## 2024-11-22 PROBLEM — E87.5 HYPERKALEMIA: Status: ACTIVE | Noted: 2024-11-22

## 2024-11-22 NOTE — ASSESSMENT & PLAN NOTE
Oxycodone 5 mg q6h PRN for moderate to severe pain and dilaudid 0.2 mg IV q6h PRN for breakthrough pain  11/20 Given dexamethasone 4 mg IV. Started on dexamethasone 2 mg IV BID. Unclear benefit d/t AMS    Plan:  Continue pain control with oxycodone and dilaudid prn  Continue dexamethasone 2 mg IV BID

## 2024-11-22 NOTE — ASSESSMENT & PLAN NOTE
Hyperkalemia since 11/21, mildly at 5.4  Today, K+ was elevated to 6.2. Elevated corrected calcium with normal ionized calcium  Given 10 units of insulin + 25cc of D50     Plan:  Repeat BMP at 10a  Will reconsider calcium gluconate if pt does not respond to therapy

## 2024-11-22 NOTE — CASE MANAGEMENT
Case Management Discharge Planning Note    Patient name Isael Avendano  Location ICU 01/ICU 01 MRN [id number]  : 1955 Date 2024       Current Admission Date: 11/15/2024  Current Admission Diagnosis:Shock (HCC)   Patient Active Problem List    Diagnosis Date Noted Date Diagnosed    Hyperkalemia 2024     Palliative care by specialist 2024     Counseling regarding advance care planning and goals of care 2024     Cancer related pain 2024     Varices, esophageal (HCC) 2024     UTI (urinary tract infection) 2024     Moderate protein-calorie malnutrition (HCC) 2024     SBP (spontaneous bacterial peritonitis) (HCC) 2024     Cellulitis of left lower extremity 2024     Shock (HCC) 11/15/2024     Decompensated cirrhosis (HCC) 11/15/2024     Diabetic ulcer of left midfoot associated with type 2 diabetes mellitus, with fat layer exposed (HCC) 2024     Thrombocytopenia (HCC) 2024     Hepatocellular carcinoma (HCC) 2024     Iron deficiency anemia due to chronic blood loss 2024     Splenomegaly, congestive, chronic 2024     Volume overload 2024     Chest wall mass 2024     Symptomatic anemia 2024     Perineal abscess 2023     Pyogenic arthritis of left shoulder region (HCC) 2023     Urinary retention 2023     Stage 3a chronic kidney disease (HCC) 06/15/2023     Murmur 06/15/2023     Postoperative infection of surgical wound 2023     Ulcer of left foot, limited to breakdown of skin (HCC) 2023     Aftercare following left shoulder joint replacement surgery 2023     S/P reverse total shoulder arthroplasty, left 2023     Post-traumatic osteoarthritis of left shoulder 2022     Cellulitis 2022     Diabetic ulcer of left foot associated with type 2 diabetes mellitus (HCC) 2022     Insomnia 2020     Elevated troponin 2020     Chest pressure 2020      Alcohol abuse 12/25/2020     Diabetes (HCC) 12/25/2020     Essential hypertension 12/25/2020     Cholelithiasis 12/25/2020     Cirrhosis, alcoholic (HCC) 12/25/2020     MEG (obstructive sleep apnea) 12/25/2020     Pancytopenia (HCC) 12/25/2020       LOS (days): 7  Geometric Mean LOS (GMLOS) (days): 4.9  Days to GMLOS:-2.1     OBJECTIVE:  Risk of Unplanned Readmission Score: 36.2         Current admission status: Inpatient   Preferred Pharmacy:   [id number] - [place] - [address]  [address] [facility] [address]  Phone: [phone] Fax: [phone]    Primary Care Provider: Collin Marcos MD    Primary Insurance: MEDICARE  Secondary Insurance: AETNA    DISCHARGE DETAILS:    CM notified by the hospice liaison that pt is approved  Hospice IPU and bed is available today. Hospice liaison notified of transport time for 2:30 pm. She will notify the family and the hospice house. CC team and RN aware of transport time. Transport form and OOH DNR placed in pt chart.    Treatment Team Recommendation: Hospice  Discharge Destination Plan:: Hospice  Transport at Discharge : [facility]     Number/Name of Dispatcher: Round Trip [phone]  Transported by (Company and Unit #): [facility]  ETA of Transport (Date): 11/22/24  ETA of Transport (Time): 1430 (CC team, RN, hospice liaison, family)    Accepting Facility Name, City & State : [facility]  Receiving Facility/Agency Phone Number: [phone]    CM later notified that unfortunately pt is unable to transfer today. Cm cancelled transport. As per hospice liaison they notified family. CM notified CC and RN.

## 2024-11-22 NOTE — CASE MANAGEMENT
Case Management Discharge Planning Note    Patient name Isael Avendano  Location ICU 01/ICU 01 MRN [id number]  : 1955 Date 2024       Current Admission Date: 11/15/2024  Current Admission Diagnosis:Shock (HCC)   Patient Active Problem List    Diagnosis Date Noted Date Diagnosed    Hyperkalemia 2024     Palliative care by specialist 2024     Counseling regarding advance care planning and goals of care 2024     Cancer related pain 2024     Varices, esophageal (HCC) 2024     UTI (urinary tract infection) 2024     Moderate protein-calorie malnutrition (HCC) 2024     SBP (spontaneous bacterial peritonitis) (HCC) 2024     Cellulitis of left lower extremity 2024     Shock (HCC) 11/15/2024     Decompensated cirrhosis (HCC) 11/15/2024     Diabetic ulcer of left midfoot associated with type 2 diabetes mellitus, with fat layer exposed (HCC) 2024     Thrombocytopenia (HCC) 2024     Hepatocellular carcinoma (HCC) 2024     Iron deficiency anemia due to chronic blood loss 2024     Splenomegaly, congestive, chronic 2024     Volume overload 2024     Chest wall mass 2024     Symptomatic anemia 2024     Perineal abscess 2023     Pyogenic arthritis of left shoulder region (HCC) 2023     Urinary retention 2023     Stage 3a chronic kidney disease (HCC) 06/15/2023     Murmur 06/15/2023     Postoperative infection of surgical wound 2023     Ulcer of left foot, limited to breakdown of skin (HCC) 2023     Aftercare following left shoulder joint replacement surgery 2023     S/P reverse total shoulder arthroplasty, left 2023     Post-traumatic osteoarthritis of left shoulder 2022     Cellulitis 2022     Diabetic ulcer of left foot associated with type 2 diabetes mellitus (HCC) 2022     Insomnia 2020     Elevated troponin 2020     Chest pressure 2020      Alcohol abuse 12/25/2020     Diabetes (HCC) 12/25/2020     Essential hypertension 12/25/2020     Cholelithiasis 12/25/2020     Cirrhosis, alcoholic (HCC) 12/25/2020     MEG (obstructive sleep apnea) 12/25/2020     Pancytopenia (HCC) 12/25/2020       LOS (days): 7  Geometric Mean LOS (GMLOS) (days): 4.9  Days to GMLOS:-2     OBJECTIVE:  Risk of Unplanned Readmission Score: 36.2         Current admission status: Inpatient   Preferred Pharmacy:   [id number] - [place] - [address]  [address] [facility]  [place] [address]  Phone: [phone] Fax: [phone]    Primary Care Provider: Collin Marcos MD    Primary Insurance: MEDICARE  Secondary Insurance: AETNA    DISCHARGE DETAILS:    Discharge planning discussed with:: Pt's wife, Kaylah and her two daughters  Freedom of Choice: Yes  Comments - [place] of Choice:  Hospice    Contacts  Patient Contacts: Kaylah Avendano (Spouse)  Relationship to Patient:: Family  Contact Method: In Person  Reason/Outcome: Discharge Planning, Continuity of Care, Referral, Emergency Contact    Other Referral/Resources/Interventions Provided:  Interventions: Hospice  Referral Comments: CM consulted for hospice. CM contacted by [facility] and notified that the family is ready for hospice. As per palliative care the family would like to speak with the  hospice liaison. Pt to be evaluated for IPU. CM spoke with pt's wife and two daughters. They confirmed they would like to speak with the hospice liaison. Aware referral placed and the liaison would be reaching out.    Treatment Team Recommendation: Hospice  Discharge Destination Plan:: Hospice

## 2024-11-22 NOTE — ASSESSMENT & PLAN NOTE
Significant for decompensated cirrhosis, ?SBP, cellulitis  Lactic acid initially 3.0, normalized to 1.9 on 11/15  At presentation, Bilateral LE 2+ pitting edema, with more tenderness and erythema in the LLE. Leukocytosis of 10.01 from baseline of 2-3  CXR - increased interstitial markings. Ucx - > 100k klebsiella, 20-29k enterococcus, Bcx no growth, peritoneal fluid culture - no growth  Suspect hypovolemic shock 2/2 sepsis and decompensated cirrhosis causing third-spacing and intravascular volume depletion despite total body volume up. Treated with ampicillin until night prior to presentation  Initially hypotensive to 83/48 with MAP of 58. D/c'd pressors 11/21 on midodrine  Bedside paracentesis performed 11/18, with 4L output. Yellow and turbid. Sent out for white count and cx, negative thus far  Completed 7 days of cefepime 2g q8h on 11/21  Pt had a GCS of 6 overnight 11/20-21. Checked ammonia, VBG, and head CT, all of which were unremarkable. Changed to NPO d/t concerns regarding airway protection. Holding midodrine and metoprolol until PO intake possible  11/21 [facility] discussion with palliative care; family ready to transition to hospice. Consult placed    Plan:  Continue albumin 12.5 g 25% q6h  Consider resuming midodrine 10 mg TID and subsequent propranolol for HR control once pt is able to tolerate PO intake  Consider course of ceftriaxone for SBP prophylaxis

## 2024-11-22 NOTE — ASSESSMENT & PLAN NOTE
HCC, prior history of heavy etoh use. MELD 25  Abdominal distention, tense until yesterday. Since paracentesis on 11/18, soft and mildly distended  Prior history of SBP, not on prophylaxis  On examination, abdomen is soft and nontender to palpation  Resumed on Bumex 1 mg BID  Holding lactulose enemas d/t rectal tube contraindications and plans to transition to hospice    Plan:  Daily MELD labs  NPO until patient is alert enough to protect airway and swallow  Discharge with nonselective beta blocker

## 2024-11-22 NOTE — ASSESSMENT & PLAN NOTE
Lab Results   Component Value Date    EGFR 67 11/22/2024    EGFR 69 11/21/2024    EGFR 66 11/20/2024    CREATININE 1.10 11/22/2024    CREATININE 1.07 11/21/2024    CREATININE 1.11 11/20/2024

## 2024-11-22 NOTE — ASSESSMENT & PLAN NOTE
Chronic, stable  Pt presented with a leukocytosis of 10 (baseline of 2-3). Currently stable at 7.85  Platelets stable   Worked up previously, secondary to hypersplenism.

## 2024-11-22 NOTE — PROGRESS NOTES
Progress Note - Critical Care/ICU   Name: Isael Avendano 69 y.o. male I MRN: [id number]  Unit/Bed#: ICU 01 I Date of Admission: 11/15/2024   Date of Service: 11/22/2024 I Hospital Day: 7      Assessment & Plan  Shock (HCC)  Significant for decompensated cirrhosis, ?SBP, cellulitis  Lactic acid initially 3.0, normalized to 1.9 on 11/15  At presentation, Bilateral LE 2+ pitting edema, with more tenderness and erythema in the LLE. Leukocytosis of 10.01 from baseline of 2-3  CXR - increased interstitial markings. Ucx - > 100k klebsiella, 20-29k enterococcus, Bcx no growth, peritoneal fluid culture - no growth  Suspect hypovolemic shock 2/2 sepsis and decompensated cirrhosis causing third-spacing and intravascular volume depletion despite total body volume up. Treated with ampicillin until night prior to presentation  Initially hypotensive to 83/48 with MAP of 58. D/c'd pressors 11/21 on midodrine  Bedside paracentesis performed 11/18, with 4L output. Yellow and turbid. Sent out for white count and cx, negative thus far  Completed 7 days of cefepime 2g q8h on 11/21  Pt had a GCS of 6 overnight 11/20-21. Checked ammonia, VBG, and head CT, all of which were unremarkable. Changed to NPO d/t concerns regarding airway protection. Holding midodrine and metoprolol until PO intake possible  11/21 [facility] discussion with palliative care; family ready to transition to hospice. Consult placed    Plan:  Continue albumin 12.5 g 25% q6h  Consider resuming midodrine 10 mg TID and subsequent propranolol for HR control once pt is able to tolerate PO intake  Consider course of ceftriaxone for SBP prophylaxis   Decompensated cirrhosis (HCC)  HCC, prior history of heavy etoh use. MELD 25  Abdominal distention, tense until yesterday. Since paracentesis on 11/18, soft and mildly distended  Prior history of SBP, not on prophylaxis  On examination, abdomen is soft and nontender to palpation  Resumed on Bumex 1 mg BID  Holding lactulose enemas d/t  rectal tube contraindications and plans to transition to hospice    Plan:  Daily MELD labs  NPO until patient is alert enough to protect airway and swallow  Discharge with nonselective beta blocker  Diabetes (HCC)  Lab Results   Component Value Date    HGBA1C 6.7 (H) 10/23/2024       Recent Labs     11/21/24  1110 11/21/24  1734 11/22/24  0024 11/22/24  0547   POCGLU 304* 286* 258* 266*       Blood Sugar Average: Last 72 hrs:  (P) [id number]    BG elevated in the 300s, in part due to steroids  Patient has not eaten dinner for the last few days. AMS overnight and made NPO  Started on insulin lantus 10 units yesterday, advanced to 40 units in AM after dexamethasone initiation BID  Additional 10 units regular insulin given for hyperkalemia    Plan:  Insulin ss q6h  Adjust basal bolus insulin and mealtime insulin as appropriate  Pancytopenia (HCC)  Chronic, stable  Pt presented with a leukocytosis of 10 (baseline of 2-3). Currently stable at 7.85  Platelets stable   Worked up previously, secondary to hypersplenism.  SBP (spontaneous bacterial peritonitis) (HCC)  As noted under shock  Cellulitis of left lower extremity  As noted under shock  Moderate protein-calorie malnutrition (HCC)  Malnutrition Findings:   Adult Malnutrition type: Acute illness  Adult Degree of Malnutrition: Malnutrition of moderate degree  Malnutrition Characteristics: Inadequate energy, Other (comment) (edema, ascited, skin breakdown)                  360 Statement: Malnutrition of moderate degree in the context of chronic illness as evidenced by edema, ascites, inadequate energy intake, skin breadown r/t liver disease. Treated with oral diet and will supplement with high kcal and protein supplement.    BMI Findings:           Body mass index is 30.53 kg/m².     Stage 3a chronic kidney disease (HCC)  Lab Results   Component Value Date    EGFR 67 11/22/2024    EGFR 69 11/21/2024    EGFR 66 11/20/2024    CREATININE 1.10 11/22/2024     CREATININE 1.07 11/21/2024    CREATININE 1.11 11/20/2024     Hepatocellular carcinoma (HCC)  PICC line in place, receiving immunotherapy at [facility]  UTI (urinary tract infection)  Urine cx positive for >100k klebsiella and 20-29k enterococcus faecalis. Tx completed with cefepime 2g q8h for 7 days  Varices, esophageal (HCC)  Plan to discharge with nonselective beta-blocker for ppx  Palliative care by specialist    Counseling regarding advance care planning and goals of care    Cancer related pain  Oxycodone 5 mg q6h PRN for moderate to severe pain and dilaudid 0.2 mg IV q6h PRN for breakthrough pain  11/20 Given dexamethasone 4 mg IV. Started on dexamethasone 2 mg IV BID. Unclear benefit d/t AMS    Plan:  Continue pain control with oxycodone and dilaudid prn  Continue dexamethasone 2 mg IV BID  Hyperkalemia  Hyperkalemia since 11/21, mildly at 5.4  Today, K+ was elevated to 6.2. Elevated corrected calcium with normal ionized calcium  Given 10 units of insulin + 25cc of D50     Plan:  Repeat BMP at 10a  Will reconsider calcium gluconate if pt does not respond to therapy  Disposition: Stepdown Level 1    ICU Core Measures     A: Assess, Prevent, and Manage Pain Has pain been assessed? Yes  Need for changes to pain regimen? No   B: Both SAT/SAT  N/A   C: Choice of Sedation RASS Goal: -2 Light Sedation  Need for changes to sedation or analgesia regimen? No   D: Delirium CAM-ICU: Negative   E: Early Mobility  Plan for early mobility? Yes   F: Family Engagement Plan for family engagement today? Yes       Antibiotic Review: Abx discontinued 11/21    Review of Invasive Devices:    Tejeda Plan: Tejeda placed by urology. Removal plans per their team and end of life care  Central access plan: Patient requires PICC secondary to immunotherapy      Prophylaxis:  VTE VTE covered by:  heparin (porcine), Subcutaneous, 5,000 Units at 11/22/24 5897       Stress Ulcer  not orderedcovered bypantoprazole (PROTONIX) 40 mg tablet [[id number]]  (Long-Term Med)         24 Hour Events : no events  Subjective  Pt is responsive to voice and turns his head and tracks vision towards person speaking, which is an improvement from yesterday. He is not moaning or verbalizing, though previous findings are an improvement from yesterday  Review of Systems: Review of Systems not obtainable due to Altered mental status    Objective :                   Vitals I/O      Most Recent Min/Max in 24hrs   Temp 97.6 °F (36.4 °C) Temp  Min: 97.5 °F (36.4 °C)  Max: 98.7 °F (37.1 °C)   Pulse (!) 117 Pulse  Min: 114  Max: 139   Resp 21 Resp  Min: 15  Max: 24   /55 BP  Min: 89/56  Max: 130/59   O2 Sat 98 % SpO2  Min: 96 %  Max: 99 %      Intake/Output Summary (Last 24 hours) at 2024 0732  Last data filed at 2024 0400  Gross per 24 hour   Intake 241.4 ml   Output 2175 ml   Net -1933.6 ml       Diet NPO    Invasive Monitoring           Physical Exam   Physical Exam  Vitals reviewed.   Eyes:      General: No scleral icterus.        Right eye: No discharge.         Left eye: No discharge.      Extraocular Movements: Extraocular movements intact.      Conjunctiva/sclera: Conjunctivae normal.   Skin:     General: Skin is warm and dry.   HENT:      Head: Normocephalic and atraumatic.      Mouth/Throat:      Mouth: Mucous membranes are moist.   Cardiovascular:      Rate and Rhythm: Regular rhythm. Tachycardia present.      Heart sounds: Normal heart sounds.   Musculoskeletal:      Right lower le+ Edema present.      Left lower leg: No edema.   Abdominal: General: Bowel sounds are normal. There is distension (mild).     Palpations: Abdomen is soft.      Tenderness: There is no abdominal tenderness.   Constitutional:       Appearance: He is ill-appearing.   Pulmonary:      Effort: Pulmonary effort is normal. No accessory muscle usage, respiratory distress or accessory muscle usage.   Neurological:      Mental Status: He is easily aroused. He is lethargic, somnolent and  calm.   Genitourinary/Anorectal:  Ileana present.        Diagnostic Studies        Lab Results: I have reviewed the following results:     Medications:  Scheduled PRN   Albumin 25%, 25 g, Q8H  bumetanide, 1 mg, BID  cefepime, 2,000 mg, Q8H  chlorhexidine, 15 mL, Q12H LEEANNE  dexamethasone, 2 mg, BID  dextrose, 25 mL, Once  heparin (porcine), 5,000 Units, Q8H LEEANNE  insulin glargine, 40 Units, QAM  insulin lispro, 2-12 Units, Q6H LEEANNE  insulin regular, 10 Units, Once  [Held by provider] lactulose, 200 g, Q4H  [Held by provider] metoprolol, 5 mg, Q6H  midodrine, 10 mg, Q8H  MEIR ANTIFUNGAL, , BID  rifaximin, 550 mg, Q12H LEEANNE      HYDROmorphone, 0.2 mg, Q6H PRN  oxyCODONE, 5 mg, Q6H PRN       Continuous    norepinephrine, 1-30 mcg/min, Last Rate: Stopped (11/21/24 0900)         Labs:   CBC    Recent Labs     11/21/24 0444 11/22/24 0328   WBC 9.36 7.85   HGB 7.8* 7.7*   HCT 26.5* 25.6*   PLT 71* 54*     BMP    Recent Labs     11/21/24 0444 11/22/24 0328   SODIUM 142 145   K 5.4* 6.2*   * 114*   CO2 22 23   AGAP 8 8   BUN 50* 58*   CREATININE 1.07 1.10   CALCIUM 9.8 10.3*       Coags    Recent Labs     11/21/24 0444 11/22/24 0328   INR 2.21* 2.57*        Additional Electrolytes  Recent Labs     11/21/24 0444 11/22/24 0328   MG 2.3 2.2   PHOS 2.7 2.9   CAIONIZED 1.21 1.22          Blood Gas    No recent results  Recent Labs     11/20/24 2122   PHVEN 7.455*   [id number] 33.7*   PO2VEN 48.7*   IOQ2IDG 23.2*   BEVEN -0.5   [id number] 80.6*    LFTs  Recent Labs     11/21/24 0444 11/22/24 0328   ALT 22 19   AST 25 26   ALKPHOS 181* 138*   ALB 3.4* 3.5   TBILI 3.39* 3.48*       Infectious  No recent results  Glucose  Recent Labs     11/20/24  2323 11/21/24  0444 11/22/24  0328   GLUC 365* 349* 293*

## 2024-11-22 NOTE — PLAN OF CARE
Problem: Potential for Falls  Goal: Patient will remain free of falls  Description: INTERVENTIONS:  - Educate patient/family on patient safety including physical limitations  - Instruct patient to call for assistance with activity   - Consult OT/PT to assist with strengthening/mobility   - Keep Call bell within reach  - Keep bed low and locked with side rails adjusted as appropriate  - Keep care items and personal belongings within reach  - Initiate and maintain comfort rounds  - Make Fall Risk Sign visible to staff  - Offer Toileting every  Hours, in advance of need  - Initiate/Maintain alarm  - Obtain necessary fall risk management equipment:   - Apply yellow socks and bracelet for high fall risk patients  - Consider moving patient to room near nurses station  Outcome: Progressing     Problem: Prexisting or High Potential for Compromised Skin Integrity  Goal: Skin integrity is maintained or improved  Description: INTERVENTIONS:  - Identify patients at risk for skin breakdown  - Assess and monitor skin integrity  - Assess and monitor nutrition and hydration status  - Monitor labs   - Assess for incontinence   - Turn and reposition patient  - Assist with mobility/ambulation  - Relieve pressure over bony prominences  - Avoid friction and shearing  - Provide appropriate hygiene as needed including keeping skin clean and dry  - Evaluate need for skin moisturizer/barrier cream  - Collaborate with interdisciplinary team   - Patient/family teaching  - Consider wound care consult   Outcome: Progressing     Problem: PAIN - ADULT  Goal: Verbalizes/displays adequate comfort level or baseline comfort level  Description: Interventions:  - Encourage patient to monitor pain and request assistance  - Assess pain using appropriate pain scale  - Administer analgesics based on type and severity of pain and evaluate response  - Implement non-pharmacological measures as appropriate and evaluate response  - Consider cultural and  social influences on pain and pain management  - Notify physician/advanced practitioner if interventions unsuccessful or patient reports new pain  Outcome: Progressing     Problem: INFECTION - ADULT  Goal: Absence or prevention of progression during hospitalization  Description: INTERVENTIONS:  - Assess and monitor for signs and symptoms of infection  - Monitor lab/diagnostic results  - Monitor all insertion sites, i.e. indwelling lines, tubes, and drains  - Monitor endotracheal if appropriate and nasal secretions for changes in amount and color  - [place] appropriate cooling/warming therapies per order  - Administer medications as ordered  - Instruct and encourage patient and family to use good hand hygiene technique  - Identify and instruct in appropriate isolation precautions for identified infection/condition  Outcome: Progressing  Goal: Absence of fever/infection during neutropenic period  Description: INTERVENTIONS:  - Monitor WBC    Outcome: Progressing     Problem: SAFETY ADULT  Goal: Patient will remain free of falls  Description: INTERVENTIONS:  - Educate patient/family on patient safety including physical limitations  - Instruct patient to call for assistance with activity   - Consult OT/PT to assist with strengthening/mobility   - Keep Call bell within reach  - Keep bed low and locked with side rails adjusted as appropriate  - Keep care items and personal belongings within reach  - Initiate and maintain comfort rounds  - Make Fall Risk Sign visible to staff  - Offer Toileting every  Hours, in advance of need  - Initiate/Maintain alarm  - Obtain necessary fall risk management equipment:   - Apply yellow socks and bracelet for high fall risk patients  - Consider moving patient to room near nurses station  Outcome: Progressing  Goal: Maintain or return to baseline ADL function  Description: INTERVENTIONS:  -  Assess patient's ability to carry out ADLs; assess patient's baseline for ADL function and  identify physical deficits which impact ability to perform ADLs (bathing, care of mouth/teeth, toileting, grooming, dressing, etc.)  - Assess/evaluate cause of self-care deficits   - Assess range of motion  - Assess patient's mobility; develop plan if impaired  - Assess patient's need for assistive devices and provide as appropriate  - Encourage maximum independence but intervene and supervise when necessary  - Involve family in performance of ADLs  - Assess for home care needs following discharge   - Consider OT consult to assist with ADL evaluation and planning for discharge  - Provide patient education as appropriate  Outcome: Progressing  Goal: Maintains/Returns to pre admission functional level  Description: INTERVENTIONS:  - Perform AM-PAC 6 Click Basic Mobility/ Daily Activity assessment daily.  - Set and communicate daily mobility goal to care team and patient/family/caregiver.   - Collaborate with rehabilitation services on mobility goals if consulted  - Perform Range of Motion  times a day.  - Reposition patient every  hours.  - Dangle patient  times a day  - Stand patient  times a day  - Ambulate patient  times a day  - Out of bed to chair  times a day   - Out of bed for meals  times a day  - Out of bed for toileting  - Record patient progress and toleration of activity level   Outcome: Progressing     Problem: DISCHARGE PLANNING  Goal: Discharge to home or other facility with appropriate resources  Description: INTERVENTIONS:  - Identify barriers to discharge w/patient and caregiver  - Arrange for needed discharge resources and transportation as appropriate  - Identify discharge learning needs (meds, wound care, etc.)  - Arrange for interpretive services to assist at discharge as needed  - Refer to Case Management Department for coordinating discharge planning if the patient needs post-hospital services based on physician/advanced practitioner order or complex needs related to functional status,  cognitive ability, or social support system  Outcome: Progressing     Problem: Knowledge Deficit  Goal: Patient/family/caregiver demonstrates understanding of disease process, treatment plan, medications, and discharge instructions  Description: Complete learning assessment and assess knowledge base.  Interventions:  - Provide teaching at level of understanding  - Provide teaching via preferred learning methods  Outcome: Progressing     Problem: Nutrition/Hydration-ADULT  Goal: Nutrient/Hydration intake appropriate for improving, restoring or maintaining nutritional needs  Description: Monitor and assess patient's nutrition/hydration status for malnutrition. Collaborate with interdisciplinary team and initiate plan and interventions as ordered.  Monitor patient's weight and dietary intake as ordered or per policy. Utilize nutrition screening tool and intervene as necessary. Determine patient's food preferences and provide high-protein, high-caloric foods as appropriate.     INTERVENTIONS:  - Monitor oral intake, urinary output, labs, and treatment plans  - Assess nutrition and hydration status and recommend course of action  - Evaluate amount of meals eaten  - Assist patient with eating if necessary   - Allow adequate time for meals  - Recommend/ encourage appropriate diets, oral nutritional supplements, and vitamin/mineral supplements  - Order, calculate, and assess calorie counts as needed  - Recommend, monitor, and adjust tube feedings and TPN/PPN based on assessed needs  - Assess need for intravenous fluids  - Provide specific nutrition/hydration education as appropriate  - Include patient/family/caregiver in decisions related to nutrition  Outcome: Progressing

## 2024-11-22 NOTE — HOSPITAL COURSE
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 During this admission, the US of his LLE was suspicious for cellulitis. He was also found to be hypotensive to 85/50 with MAPs in low 60s requiring vasopressor at 6 and started on IV albumin. He was ultimately admitted to ICU for concern of septic shock and with initial abx of cefepime and vancomycin. On day 2, had increased vasopressor requirement with brief, nonsustained vtach, which has not recurred since. On Monday 11/18, he underwent paracentesis with marked improvement in abdominal discomfort. Blood cultures remained negative, but UA showed klebsiella and enterococcus; due to the possibility of simultaneous infections, his abx was narrowed to only cefepime. He was also transitioned to his home regimen of insulin lantus 40 units daily in the morning for persistent hyperglycemia. On 11/19, MAP goal was changed to 60 in the setting of his hepatic failure, and he was able to be weaned off levophed by 11/21, the same day that IV abx were completed. In the interim, midodrine 10 mg was started, but stopped yesterday (in addition to other oral medications) due to AMS and concerns about airway protection. Lactulose enemas were held due to contraindications to rectal tube placement (moderate-severe hemorrhoids).    Palliative care was consulted and started him on IV dexamethasone 2 mg, oxycodone 5 mg PRN, and dilaudid 0.2 mg PRN for breakthrough pain, with ativan 0.5 mg IV added today. We discussed goals of care, and his family determined that they were ready to transition him to hospice. Currently, he has a PICC line placed by med onc for previous tx with immunotherapy and a cadena placed  by urology. Some of his non-comfort medications have been discontinued and he has been started on pleasure feeds. He continues to have waxing and waning mentation. While he is stable on room air, he has a tendency towards soft blood pressures, which is to be expected with his medical conditions; his MAP goal is > 60 and he is receiving albumin 25g q8h, without need to be on pressors for the last 24+ hours. He was accepted to  hospice house today; the facility lost power, however, and he will be unable to be discharged today, though we hope to have him transported tomorrow. In the meantime, he remains a level 3 and will be having a lab holiday tomorrow.

## 2024-11-22 NOTE — ASSESSMENT & PLAN NOTE
Lab Results   Component Value Date    HGBA1C 6.7 (H) 10/23/2024       Recent Labs     11/21/24  1110 11/21/24  1734 11/22/24  0024 11/22/24  0547   POCGLU 304* 286* 258* 266*       Blood Sugar Average: Last 72 hrs:  (P) [id number]    BG elevated in the 300s, in part due to steroids  Patient has not eaten dinner for the last few days. AMS overnight and made NPO  Started on insulin lantus 10 units yesterday, advanced to 40 units in AM after dexamethasone initiation BID  Additional 10 units regular insulin given for hyperkalemia    Plan:  Insulin ss q6h  Adjust basal bolus insulin and mealtime insulin as appropriate

## 2024-11-22 NOTE — PROGRESS NOTES
Progress Note - Critical Care/ICU   Name: Isael Avendano 69 y.o. 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 He was found to be hypotensive to 85/50 with MAPs in low 60s requiring vasopressor at 6 and started on IV albumin. He was started on cefepime. On day 2, had increased vasopressor requirement with brief, nonsustained vtach, which has not recurred since. On Monday 11/18, he underwent paracentesis with marked improvement in abdominal discomfort. Blood cultures remained negative, but UA showed klebsiella and enterococcus; with the possibility of simultaneous infections in different locations (abdomen, urinary tract, soft tissue), cefepime was continued. He was also transitioned to his home regimen of insulin lantus 40 units daily in the morning for persistent hyperglycemia. On 11/19, MAP goal was changed to 60 in the setting of his hepatic failure, and he was able to be weaned off levophed by 11/21, the same day that IV abx were completed. In the interim, midodrine 10 mg was started, but stopped yesterday (in addition to other oral medications) due to AMS and concerns about airway protection. Lactulose enemas were held due to contraindications to rectal tube placement (moderate-severe hemorrhoids).    Palliative care was consulted and started him on IV dexamethasone 2 mg, oxycodone 5 mg PRN, and dilaudid 0.2 mg PRN for breakthrough pain, with ativan 0.5 mg IV added today. We discussed goals of care, and his  family determined that they were ready to transition him to hospice. Currently, he has a PICC line placed by med onc for previous tx with immunotherapy and a cadena placed by urology. Some of his non-comfort medications have been discontinued and he has been started on pleasure feeds. He continues to have waxing and waning mentation. While he is stable on room air, he has a tendency towards soft blood pressures, which is to be expected with his medical conditions; his MAP goal is > 60 and he is receiving albumin 25g q8h, without need to be on pressors for the last 24+ hours. He was accepted to [facility] today; the facility lost power, however, and he will be unable to be discharged today, though we hope to have him transported tomorrow. In the meantime, he has transitioned to Level 4 comfort care and will be having a lab holiday tomorrow.     Barriers to discharge:   None     Consults: IP CONSULT TO CASE MANAGEMENT  IP CONSULT TO PHARMACY  IP CONSULT TO UROLOGY  IP CONSULT TO GASTROENTEROLOGY  IP CONSULT TO PALLIATIVE CARE  IP CONSULT TO HOSPICE    Recommended to review admission imaging for incidental findings and document in discharge navigator: Chart reviewed, no known incidental findings noted at this time.      Discharge Plan: Anticipate discharge tomorrow to [facility]  [place] Plan: end of life care  Central access plan:  Maintain for medication administration in the setting of end of life care    PT Recommendations: Home with outpatient rehabilitationOT Recommendations: No rehabilitation needs   Patient seen and evaluated by Critical Care today and deemed to be appropriate for transfer to Med Surg. Spoke to Dr. Chauhan from [facility] to accept transfer. Critical care can be contacted via SecureChat with any questions or concerns. Please use the Critical Care AP Role in Secure Chat for any provider inquires until the patient is transferred out of the ICU or until tomorrow at 0600.

## 2024-11-23 ENCOUNTER — HOME CARE VISIT (OUTPATIENT)
Dept: HOME HOSPICE | Facility: HOSPICE | Age: 69
End: 2024-11-23
Payer: MEDICARE

## 2024-11-23 NOTE — CASE MANAGEMENT
Case Management Discharge Planning Note    Patient name Isael Avendano  Location W /W -01 MRN [id number]  : 1955 Date 2024       Current Admission Date: 11/15/2024  Current Admission Diagnosis:Shock (HCC)   Patient Active Problem List    Diagnosis Date Noted Date Diagnosed    Hyperkalemia 2024     Palliative care by specialist 2024     Counseling regarding advance care planning and goals of care 2024     Cancer related pain 2024     Varices, esophageal (HCC) 2024     UTI (urinary tract infection) 2024     Moderate protein-calorie malnutrition (HCC) 2024     SBP (spontaneous bacterial peritonitis) (HCC) 2024     Cellulitis of left lower extremity 2024     Shock (HCC) 11/15/2024     Decompensated cirrhosis (HCC) 11/15/2024     Diabetic ulcer of left midfoot associated with type 2 diabetes mellitus, with fat layer exposed (HCC) 2024     Thrombocytopenia (HCC) 2024     Hepatocellular carcinoma (HCC) 2024     Iron deficiency anemia due to chronic blood loss 2024     Splenomegaly, congestive, chronic 2024     Volume overload 2024     Chest wall mass 2024     Symptomatic anemia 2024     Perineal abscess 2023     Pyogenic arthritis of left shoulder region (HCC) 2023     Urinary retention 2023     Stage 3a chronic kidney disease (HCC) 06/15/2023     Murmur 06/15/2023     Postoperative infection of surgical wound 2023     Ulcer of left foot, limited to breakdown of skin (HCC) 2023     Aftercare following left shoulder joint replacement surgery 2023     S/P reverse total shoulder arthroplasty, left 2023     Post-traumatic osteoarthritis of left shoulder 2022     Cellulitis 2022     Diabetic ulcer of left foot associated with type 2 diabetes mellitus (HCC) 2022     Insomnia 2020     Elevated troponin 2020     Chest pressure  12/29/2020     Alcohol abuse 12/25/2020     Diabetes (HCC) 12/25/2020     Essential hypertension 12/25/2020     Cholelithiasis 12/25/2020     Cirrhosis, alcoholic (HCC) 12/25/2020     MEG (obstructive sleep apnea) 12/25/2020     Pancytopenia (HCC) 12/25/2020       LOS (days): 8  Geometric Mean LOS (GMLOS) (days): 4.9  Days to GMLOS:-3.1     OBJECTIVE:  Risk of Unplanned Readmission Score: 40.93         Current admission status: Inpatient   Preferred Pharmacy:   [id number] - [place] - [address]  [address]  [place] [address]  Phone: [phone] Fax: [phone]    Primary Care Provider: Collin Marcos MD    Primary Insurance: MEDICARE  Secondary Insurance: AETNA    DISCHARGE DETAILS:    Discharge planning discussed with:: Kaylah Avendano (Spouse)  Freedom of Choice: Yes  Comments - Freedom of Choice: Reviewed plan for DC to Hospice House  CM contacted family/caregiver?: Yes  Were Treatment Team discharge recommendations reviewed with patient/caregiver?: Yes  Did patient/caregiver verbalize understanding of patient care needs?: N/A- going to facility  Were patient/caregiver advised of the risks associated with not following Treatment Team discharge recommendations?: Yes    Contacts  Patient Contacts: Kaylah Avendano (Spouse)  Relationship to Patient:: Family  Contact Method: Phone  Phone Number: [phone]  Reason/Outcome: Discharge Planning, Emergency Contact, Continuity of Care    Requested Home Health Care         Is the patient interested in HHC at discharge?: No    DME Referral Provided  Referral made for DME?: No    Other Referral/Resources/Interventions Provided:  Interventions: Hospice, Transportation         Treatment Team Recommendation: Hospice  Discharge Destination Plan:: Hospice  Transport at Discharge : [facility]     Number/Name of Dispatcher: Requested via Roundtrip  Transported by (Company and Unit #): [facility]  ETA of Transport (Date): 11/23/24  ETA of Transport (Time): 1300                             Accepting Facility Name, City & State : [facility]  Receiving Facility/Agency Phone Number: [phone]  Facility/Agency Fax Number: [phone]     CM was updated by hospice liaison that patient can be set up for transport today to [facility].    CM called and spoke with patient's wife to review what time transport will be requested for and to discuss adding her to the referral request so that she get text message updates.    CM sent medical necessity to primary nurse and requested [facility] to complete the out of hospital DNR for transport.    CODIE, primary nurse, hospice team, patient and family all aware of the DCP and transport time.    No further CM DC needs were discussed or identified at this time.

## 2024-11-23 NOTE — ASSESSMENT & PLAN NOTE
Family met with hospice yesterday and agreed on inpatient hospice transfer today.     Code Status: DNAR/DNI - Level 4   Decisional apparatus: Patient does not have decision-making capacity, his wife, 2 daughters and sister-in-law at bedside in agreement with the plan   Advance Directive / Living Will / POLST: Completed out-of-hospital DNR today

## 2024-11-23 NOTE — ASSESSMENT & PLAN NOTE
Initial presentation of shock, transition to in the ICU to comfort driven care after prolonged hospital stay

## 2024-11-23 NOTE — PLAN OF CARE
Problem: Potential for Falls  Goal: Patient will remain free of falls  Description: INTERVENTIONS:  - Educate patient/family on patient safety including physical limitations  - Instruct patient to call for assistance with activity   - Consult OT/PT to assist with strengthening/mobility   - Keep Call bell within reach  - Keep bed low and locked with side rails adjusted as appropriate  - Keep care items and personal belongings within reach  - Initiate and maintain comfort rounds  - Make Fall Risk Sign visible to staff  - Offer Toileting every 2 Hours, in advance of need  - Initiate/Maintain bed alarm  - Obtain necessary fall risk management equipment: yellow socks  - Apply yellow socks and bracelet for high fall risk patients  - Consider moving patient to room near nurses station  Outcome: Progressing     Problem: Prexisting or High Potential for Compromised Skin Integrity  Goal: Skin integrity is maintained or improved  Description: INTERVENTIONS:  - Identify patients at risk for skin breakdown  - Assess and monitor skin integrity  - Assess and monitor nutrition and hydration status  - Monitor labs   - Assess for incontinence   - Turn and reposition patient  - Assist with mobility/ambulation  - Relieve pressure over bony prominences  - Avoid friction and shearing  - Provide appropriate hygiene as needed including keeping skin clean and dry  - Evaluate need for skin moisturizer/barrier cream  - Collaborate with interdisciplinary team   - Patient/family teaching  - Consider wound care consult   Outcome: Progressing     Problem: PAIN - ADULT  Goal: Verbalizes/displays adequate comfort level or baseline comfort level  Description: Interventions:  - Encourage patient to monitor pain and request assistance  - Assess pain using appropriate pain scale  - Administer analgesics based on type and severity of pain and evaluate response  - Implement non-pharmacological measures as appropriate and evaluate response  - Consider  cultural and social influences on pain and pain management  - Notify physician/advanced practitioner if interventions unsuccessful or patient reports new pain  Outcome: Progressing     Problem: INFECTION - ADULT  Goal: Absence or prevention of progression during hospitalization  Description: INTERVENTIONS:  - Assess and monitor for signs and symptoms of infection  - Monitor lab/diagnostic results  - Monitor all insertion sites, i.e. indwelling lines, tubes, and drains  - Monitor endotracheal if appropriate and nasal secretions for changes in amount and color  - [place] appropriate cooling/warming therapies per order  - Administer medications as ordered  - Instruct and encourage patient and family to use good hand hygiene technique  - Identify and instruct in appropriate isolation precautions for identified infection/condition  Outcome: Progressing  Goal: Absence of fever/infection during neutropenic period  Description: INTERVENTIONS:  - Monitor WBC    Outcome: Progressing     Problem: SAFETY ADULT  Goal: Patient will remain free of falls  Description: INTERVENTIONS:  - Educate patient/family on patient safety including physical limitations  - Instruct patient to call for assistance with activity   - Consult OT/PT to assist with strengthening/mobility   - Keep Call bell within reach  - Keep bed low and locked with side rails adjusted as appropriate  - Keep care items and personal belongings within reach  - Initiate and maintain comfort rounds  - Make Fall Risk Sign visible to staff  - Offer Toileting every 2 Hours, in advance of need  - Initiate/Maintain bed alarm  - Obtain necessary fall risk management equipment: yellow socks  - Apply yellow socks and bracelet for high fall risk patients  - Consider moving patient to room near nurses station  Outcome: Progressing  Goal: Maintain or return to baseline ADL function  Description: INTERVENTIONS:  -  Assess patient's ability to carry out ADLs; assess patient's  baseline for ADL function and identify physical deficits which impact ability to perform ADLs (bathing, care of mouth/teeth, toileting, grooming, dressing, etc.)  - Assess/evaluate cause of self-care deficits   - Assess range of motion  - Assess patient's mobility; develop plan if impaired  - Assess patient's need for assistive devices and provide as appropriate  - Encourage maximum independence but intervene and supervise when necessary  - Involve family in performance of ADLs  - Assess for home care needs following discharge   - Consider OT consult to assist with ADL evaluation and planning for discharge  - Provide patient education as appropriate  Outcome: Progressing  Goal: Maintains/Returns to pre admission functional level  Description: INTERVENTIONS:  - Perform AM-PAC 6 Click Basic Mobility/ Daily Activity assessment daily.  - Set and communicate daily mobility goal to care team and patient/family/caregiver.   - Collaborate with rehabilitation services on mobility goals if consulted  - Perform Range of Motion 2 times a day.  - Reposition patient every 2 hours.  - Dangle patient 2 times a day  - Stand patient 2 times a day  - Ambulate patient 2 times a day  - Out of bed to chair 2 times a day   - Out of bed for meals 2 times a day  - Out of bed for toileting  - Record patient progress and toleration of activity level   Outcome: Progressing     Problem: DISCHARGE PLANNING  Goal: Discharge to home or other facility with appropriate resources  Description: INTERVENTIONS:  - Identify barriers to discharge w/patient and caregiver  - Arrange for needed discharge resources and transportation as appropriate  - Identify discharge learning needs (meds, wound care, etc.)  - Arrange for interpretive services to assist at discharge as needed  - Refer to Case Management Department for coordinating discharge planning if the patient needs post-hospital services based on physician/advanced practitioner order or complex needs  related to functional status, cognitive ability, or social support system  Outcome: Progressing     Problem: Knowledge Deficit  Goal: Patient/family/caregiver demonstrates understanding of disease process, treatment plan, medications, and discharge instructions  Description: Complete learning assessment and assess knowledge base.  Interventions:  - Provide teaching at level of understanding  - Provide teaching via preferred learning methods  Outcome: Progressing     Problem: Nutrition/Hydration-ADULT  Goal: Nutrient/Hydration intake appropriate for improving, restoring or maintaining nutritional needs  Description: Monitor and assess patient's nutrition/hydration status for malnutrition. Collaborate with interdisciplinary team and initiate plan and interventions as ordered.  Monitor patient's weight and dietary intake as ordered or per policy. Utilize nutrition screening tool and intervene as necessary. Determine patient's food preferences and provide high-protein, high-caloric foods as appropriate.     INTERVENTIONS:  - Monitor oral intake, urinary output, labs, and treatment plans  - Assess nutrition and hydration status and recommend course of action  - Evaluate amount of meals eaten  - Assist patient with eating if necessary   - Allow adequate time for meals  - Recommend/ encourage appropriate diets, oral nutritional supplements, and vitamin/mineral supplements  - Order, calculate, and assess calorie counts as needed  - Recommend, monitor, and adjust tube feedings and TPN/PPN based on assessed needs  - Assess need for intravenous fluids  - Provide specific nutrition/hydration education as appropriate  - Include patient/family/caregiver in decisions related to nutrition  Outcome: Progressing

## 2024-11-23 NOTE — ASSESSMENT & PLAN NOTE
Palliative diagnosis: Decompensated cirrhosis with hepatocellular carcinoma  PPS: 20%  Seen by palliative care, plan for inpatient hospice today at 1 PM.  Discussed with wife, 2 daughters and sister-in-law at bedside.  They are comfortable with the plan.  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

## 2024-11-23 NOTE — DISCHARGE SUMMARY
Discharge Summary - Hospitalist   Name: Isael Avendano 69 y.o. male I MRN: [id number]  Unit/Bed#: W -01 I Date of Admission: 11/15/2024   Date of Service: 11/23/2024 I Hospital Day: 8     Assessment & Plan  Shock (HCC)  Initial presentation of shock, transition to in the ICU to comfort driven care after prolonged hospital stay    Palliative care by specialist  Palliative diagnosis: Decompensated cirrhosis with hepatocellular carcinoma  PPS: 20%  Seen by palliative care, plan for inpatient hospice today at 1 PM.  Discussed with wife, 2 daughters and sister-in-law at bedside.  They are comfortable with the plan.  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     Code Status: DNAR/DNI - Level 4   Decisional apparatus: Patient does not have decision-making capacity, his wife, 2 daughters and sister-in-law at bedside in agreement with the plan   Advance Directive / Living Will / POLST: Completed out-of-hospital DNR today     Medical Problems       Resolved Problems  Date Reviewed: 9/24/2024          Resolved    ABILIO (acute kidney injury) (HCC) 11/19/2024     Resolved by  Ellen Moctezuma DO    Hyponatremia 11/19/2024     Resolved by  Ellen Moctezuma DO        Discharging Physician / Practitioner: Pietro Jacques MD  PCP: Collin Marcos MD  Admission Date:   Admission Orders (From admission, onward)       Ordered        11/15/24 1208  INPATIENT ADMISSION  Once                          Discharge Date: 11/23/24    Consultations During Hospital  "Stay:  Hospice  Palliative care  Urology      Complications: Septic shock    Reason for Admission: Septic shock    Hospital Course:   Isael Avendano is a 69 y.o. male patient who originally presented to the hospital on 11/15/2024 due to septic shock.  He has a history of hepatocellular carcinoma and cirrhosis with an elevated MELD score not amenable to transplant.  He was initially mated to the ICU and treated for shock presumably due to cellulitis versus UTI and required vasopressors and antibiotics.  Upon discussion with the family, it was decided that the patient would have goals reflective of palliative care and hospice.  He was transition to comfort driven level of care and transferred to hospice house on November 23.    Please see above list of diagnoses and related plan for additional information.     Condition at Discharge: guarded    Discharge Day Visit / Exam:   Subjective: Patient not responsive  Vitals: Blood Pressure: 108/58 (11/22/24 1800)  Pulse: (!) 116 (11/22/24 1800)  Temperature: 97.6 °F (36.4 °C) (11/22/24 0729)  Temp Source: Axillary (11/22/24 0729)  Respirations: 14 (11/22/24 1800)  Height: 5' 11\" (180.3 cm) (11/16/24 0700)  Weight - Scale: 99.3 kg (218 lb 14.7 oz) (11/21/24 0214)  SpO2: 100 % (11/22/24 1800)  Physical Exam  Vitals and nursing note reviewed.   Constitutional:       Appearance: He is ill-appearing and diaphoretic.   HENT:      Head: Normocephalic and atraumatic.      Nose: Nose normal.      Mouth/Throat:      Mouth: Mucous membranes are moist.   Eyes:      Conjunctiva/sclera: Conjunctivae normal.   Pulmonary:      Effort: Pulmonary effort is normal. No respiratory distress.          Discussion with Family: Updated  (wife, daughter, and sister) at bedside.    Discharge instructions/Information to patient and family:   See after visit summary for information provided to patient and family.      Provisions for Follow-Up Care:  See after visit summary for information " related to follow-up care and any pertinent home health orders.      Mobility at time of Discharge:   Basic Mobility Inpatient Raw Score: 6  JH-HLM Goal: 2: Bed activities/Dependent transfer  JH-HLM Achieved: 2: Bed activities/Dependent transfer  HLM Goal achieved. Continue to encourage appropriate mobility.     Disposition:   Other: Hospice house    Planned Readmission: y    Discharge Medications:  See after visit summary for reconciled discharge medications provided to patient and/or family.      Administrative Statements   Discharge Statement:  I have spent a total time of 35 minutes in caring for this patient on the day of the visit/encounter. .    **Please Note: This note may have been constructed using a voice recognition system**

## 2024-11-24 PROBLEM — L89.90 PRESSURE ULCER: Status: ACTIVE | Noted: 2024-10-24

## 2024-11-24 PROBLEM — K70.31 ALCOHOLIC CIRRHOSIS OF LIVER WITH ASCITES (HCC): Status: ACTIVE | Noted: 2020-12-25

## 2024-11-24 PROBLEM — I85.00 ESOPHAGEAL VARICES (HCC): Status: ACTIVE | Noted: 2024-07-01

## 2024-11-24 PROBLEM — E11.21 TYPE 2 DIABETES MELLITUS WITH DIABETIC NEPHROPATHY (HCC): Status: ACTIVE | Noted: 2024-10-21

## 2024-11-24 PROBLEM — E11.610 TYPE 2 DIABETES MELLITUS WITH DIABETIC NEUROPATHIC ARTHROPATHY, WITH LONG-TERM CURRENT USE OF INSULIN (HCC): Status: ACTIVE | Noted: 2024-07-08

## 2024-11-24 PROBLEM — N18.30 CHRONIC KIDNEY DISEASE, STAGE 3 UNSPECIFIED (HCC): Status: ACTIVE | Noted: 2023-06-15

## 2024-11-24 PROBLEM — Z79.4 TYPE 2 DIABETES MELLITUS WITH DIABETIC NEUROPATHIC ARTHROPATHY, WITH LONG-TERM CURRENT USE OF INSULIN (HCC): Status: ACTIVE | Noted: 2024-07-08

## 2024-11-24 PROBLEM — D69.6 THROMBOCYTOPENIA, UNSPECIFIED (HCC): Status: ACTIVE | Noted: 2021-07-25

## 2024-11-24 PROBLEM — Z47.1 AFTERCARE FOLLOWING LEFT SHOULDER JOINT REPLACEMENT SURGERY: Status: RESOLVED | Noted: 2023-04-17 | Resolved: 2024-11-24

## 2024-11-24 PROBLEM — R18.8 ASCITES: Status: ACTIVE | Noted: 2024-07-01

## 2024-11-24 PROBLEM — Z96.612 AFTERCARE FOLLOWING LEFT SHOULDER JOINT REPLACEMENT SURGERY: Status: RESOLVED | Noted: 2023-04-17 | Resolved: 2024-11-24

## 2024-11-24 PROBLEM — K65.2 SBP (SPONTANEOUS BACTERIAL PERITONITIS) (HCC): Status: ACTIVE | Noted: 2024-10-27

## 2024-11-24 PROBLEM — T81.49XA POSTOPERATIVE CELLULITIS OF SURGICAL WOUND: Status: RESOLVED | Noted: 2023-06-11 | Resolved: 2024-11-24

## 2024-11-24 PROBLEM — M19.112 POST-TRAUMATIC OSTEOARTHRITIS OF LEFT SHOULDER: Status: RESOLVED | Noted: 2022-12-08 | Resolved: 2024-11-24

## 2024-12-02 ENCOUNTER — HOME CARE VISIT (OUTPATIENT)
Dept: HOME HOSPICE | Facility: HOSPICE | Age: 69
End: 2024-12-02
Payer: MEDICARE

## 2024-12-02 NOTE — CASE COMMUNICATION
Isael Avendano, Bereavement Final IDG 24 (1MR) Due: 24   : 1955  SOC: 24  DOD: 24  Diagnosis: HCC, Cirrhosis   Isael was a 69 year old man at the [facility]. Wife of 44 years is Kaylah have been  for 44 years. They lived and worked together. 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 2 daughters. 5 grandchildren. Kaylah described Isael as determined, hard-working and committed to fam pankaj. He cared for his grandfather, parents, great aunt, and great uncle in his home as their health declined. He loved to sail and trips to the beach. Isael was a [employer], member of the [employer], and several business organizations. Kaylah said he is well-respected in the community.    TOD: Family said Isael was such a fighter. They gathered around hearing his passing was imminent and they were grieving appropriately at TOD. Kaylah w as able to share a few stores and stated how much she would miss him. She is open to bereavement follow-up for the family.    Call Assignments:  GABRIEL Troy to reassess wife Kaylah Avendano at MR. (Due: 24)

## 2025-03-27 NOTE — ASSESSMENT & PLAN NOTE
Lab Results   Component Value Date    HGBA1C 7 7 (H) 09/12/2022       Recent Labs     09/15/22  1619 09/15/22  2153 09/16/22  0733 09/16/22  1132   POCGLU 110 133 99 128       Blood Sugar Average: Last 72 hrs:     Patient says morning glucose this morning was in the 90s and he has been working to control his diabetes  Patient reported he is on Lantus 40 mg, glimepiride and Jardiance at home  Follows up with ophthalmology  Patient only took 20 units of his HS insulin yesterday      Plan  - outpatient follow-up with PCP  - SSI, fingerstick glucose checks Rounded with internal medicine this AM, unheld insulin lantus per verbal read back.

## (undated) DEVICE — GLOVE SRG BIOGEL 7.5

## (undated) DEVICE — IMPERVIOUS STOCKINETTE: Brand: DEROYAL

## (undated) DEVICE — 3M™ IOBAN™ 2 ANTIMICROBIAL INCISE DRAPE 6650EZ: Brand: IOBAN™ 2

## (undated) DEVICE — PLUMEPEN PRO 10FT

## (undated) DEVICE — KIT STABILIZATION SHOULDER MARCO

## (undated) DEVICE — SUT VICRYL PLUS 2-0 CTB-1 27 IN VCPB259H

## (undated) DEVICE — NEPTUNE E-SEP SMOKE EVACUATION PENCIL, COATED, 70MM BLADE, PUSH BUTTON SWITCH: Brand: NEPTUNE E-SEP

## (undated) DEVICE — 3M™ STERI-STRIP™ REINFORCED ADHESIVE SKIN CLOSURES, R1547, 1/2 IN X 4 IN (12 MM X 100 MM), 6 STRIPS/ENVELOPE: Brand: 3M™ STERI-STRIP™

## (undated) DEVICE — DRILL BIT 3.2MM

## (undated) DEVICE — CHLORAPREP HI-LITE 26ML ORANGE

## (undated) DEVICE — SUT MONOCRYL 2-0 SH 27 IN Y417H

## (undated) DEVICE — CULTURE TUBE ANAEROBIC

## (undated) DEVICE — CAPIT SHLDR ASCEND FLEX REVERSE W PERFORM

## (undated) DEVICE — INTENDED FOR TISSUE SEPARATION, AND OTHER PROCEDURES THAT REQUIRE A SHARP SURGICAL BLADE TO PUNCTURE OR CUT.: Brand: BARD-PARKER SAFETY BLADES SIZE 15, STERILE

## (undated) DEVICE — DRESSING MEPILEX AG BORDER 4 X 8 IN

## (undated) DEVICE — INTENDED FOR TISSUE SEPARATION, AND OTHER PROCEDURES THAT REQUIRE A SHARP SURGICAL BLADE TO PUNCTURE OR CUT.: Brand: BARD-PARKER SAFETY BLADES SIZE 10, STERILE

## (undated) DEVICE — MAYO STAND COVER: Brand: CONVERTORS

## (undated) DEVICE — PROXIMATE PLUS MD MULTI-DIRECTIONAL RELEASE SKIN STAPLERS CONTAINS 35 STAINLESS STEEL STAPLES APPROXIMATE CLOSED DIMENSIONS: 6.9MM X 3.9MM WIDE: Brand: PROXIMATE

## (undated) DEVICE — SUT FIBERWIRE #2 1/2 CIRCLE T-5 38IN AR-7200

## (undated) DEVICE — INTENT TO BE USED WITH SUTURE MATERIAL FOR TISSUE CLOSURE: Brand: RICHARD-ALLAN®  NEEDLE 1/2 CIRCLE REVERSE CUTTING

## (undated) DEVICE — DUAL CUT SAGITTAL BLADE

## (undated) DEVICE — HEAVY DUTY TABLE COVER: Brand: CONVERTORS

## (undated) DEVICE — SUT VICRYL PLUS 0 CTB-1 27 IN VCPB260H

## (undated) DEVICE — COBAN 4 IN STERILE

## (undated) DEVICE — ABDOMINAL PAD: Brand: DERMACEA

## (undated) DEVICE — 3 BONE CEMENT MIXER: Brand: MIXEVAC

## (undated) DEVICE — GLOVE INDICATOR PI UNDERGLOVE SZ 7.5 BLUE

## (undated) DEVICE — SUT PDS II 2-0 CT-1 27 IN Z339H

## (undated) DEVICE — SPONGE PVP SCRUB WING STERILE

## (undated) DEVICE — ADHESIVE SKIN HIGH VISCOSITY EXOFIN 1ML

## (undated) DEVICE — PENCIL ELECTROSURG E-Z CLEAN -0035H

## (undated) DEVICE — CAPIT SRV BLUEPRINT REVISION PLANNING

## (undated) DEVICE — CULTURE TUBE AEROBIC

## (undated) DEVICE — PACK MAJOR ORTHO W/SPLITS PBDS

## (undated) DEVICE — ACE WRAP 4 IN STERILE

## (undated) DEVICE — DRAPE EQUIPMENT RF WAND

## (undated) DEVICE — GLENOSPHERE 36MM LATERALIZED: Type: IMPLANTABLE DEVICE | Site: SHOULDER | Status: NON-FUNCTIONAL

## (undated) DEVICE — THE SIMPULSE SOLO SYSTEM WITH ULTREX RETRACTABLE SPLASH SHIELD TIP: Brand: SIMPULSE SOLO

## (undated) DEVICE — DRESSING XEROFORM 5 X 9

## (undated) DEVICE — UNIVERSAL MAJOR EXTREMITY,KIT: Brand: CARDINAL HEALTH

## (undated) DEVICE — ARTHROSCOPY FLOOR MAT

## (undated) DEVICE — SUT 2 ORTHOCORD 36 IN W/O NEEDLES

## (undated) DEVICE — PAD GROUNDING ADULT

## (undated) DEVICE — HANDPIECE SET WITH RETRACTABLE COAXIAL FAN SPRAY TIP AND SUCTION TUBE: Brand: INTERPULSE

## (undated) DEVICE — PADDING CAST 4 IN  COTTON STRL

## (undated) DEVICE — HOOD: Brand: FLYTE, SURGICOOL

## (undated) DEVICE — INTENDED FOR TISSUE SEPARATION, AND OTHER PROCEDURES THAT REQUIRE A SHARP SURGICAL BLADE TO PUNCTURE OR CUT.: Brand: BARD-PARKER ® CARBON RIB-BACK BLADES

## (undated) DEVICE — X-RAY DETECTABLE SPONGES,16 PLY: Brand: VISTEC

## (undated) DEVICE — DRAPE C-ARM X-RAY

## (undated) DEVICE — VAC DRESSING SENSATRAC SMALL

## (undated) DEVICE — SPECIMEN CONTAINER STERILE PEEL PACK

## (undated) DEVICE — DISPOSABLE EQUIPMENT COVER: Brand: SMALL TOWEL DRAPE

## (undated) DEVICE — JP 3-SPRING RES W/10FR PVC DRAIN/TR: Brand: CARDINAL HEALTH

## (undated) DEVICE — GAUZE SPONGES,16 PLY: Brand: CURITY

## (undated) DEVICE — GUIDE PIN 2.5 X 220MM AEQUALIS PERFORM PLUS

## (undated) DEVICE — SUT 2 ORTHOCORD MO7

## (undated) DEVICE — HOOD: Brand: T7PLUS

## (undated) DEVICE — DRESSING MEPILEX AG BORDER POST-OP 4 X 8 IN

## (undated) DEVICE — SUT PDS II 1 CTX 36 IN Z371T

## (undated) DEVICE — VAC CANISTER 500ML

## (undated) DEVICE — SUT MONOCRYL 3-0 PS-2 18 IN Y497G

## (undated) DEVICE — GLOVE SRG BIOGEL 8

## (undated) DEVICE — ELECTRODE BALL E-Z CLEAN 5 IN -0009

## (undated) DEVICE — BETHLEHEM UNIV MAJOR ORTHO,KIT: Brand: CARDINAL HEALTH

## (undated) DEVICE — SUT ETHIBOND 0 CT-2 30 IN X412H

## (undated) DEVICE — SUT ETHILON 3-0 FSLX 30 IN 1673H

## (undated) DEVICE — GROUNDING PAD UNIVERSAL SLW

## (undated) DEVICE — GLOVE INDICATOR PI UNDERGLOVE SZ 8 BLUE

## (undated) DEVICE — SUT PDS PLUS 1 CTX 36IN PDP371T